# Patient Record
Sex: FEMALE | Race: OTHER | Employment: OTHER | URBAN - METROPOLITAN AREA
[De-identification: names, ages, dates, MRNs, and addresses within clinical notes are randomized per-mention and may not be internally consistent; named-entity substitution may affect disease eponyms.]

---

## 2020-01-10 ENCOUNTER — HOSPITAL ENCOUNTER (EMERGENCY)
Age: 75
Discharge: HOME | End: 2020-01-10
Attending: EMERGENCY MEDICINE
Payer: MEDICARE

## 2020-01-10 VITALS
HEIGHT: 62 IN | OXYGEN SATURATION: 96 % | BODY MASS INDEX: 46.01 KG/M2 | RESPIRATION RATE: 14 BRPM | HEART RATE: 87 BPM | WEIGHT: 250 LBS | DIASTOLIC BLOOD PRESSURE: 55 MMHG | SYSTOLIC BLOOD PRESSURE: 109 MMHG | TEMPERATURE: 98.2 F

## 2020-01-10 DIAGNOSIS — N18.5 CKD (CHRONIC KIDNEY DISEASE) STAGE 5, GFR LESS THAN 15 ML/MIN (HCC): Primary | ICD-10-CM

## 2020-01-10 DIAGNOSIS — M79.10 MYALGIA: ICD-10-CM

## 2020-01-10 LAB
ALBUMIN SERPL-MCNC: 3 G/DL (ref 3.4–5)
ALBUMIN/GLOB SERPL: 0.6 {RATIO} (ref 0.8–1.7)
ALP SERPL-CCNC: 154 U/L (ref 45–117)
ALT SERPL-CCNC: 13 U/L (ref 13–56)
ANION GAP SERPL CALC-SCNC: 4 MMOL/L (ref 3–18)
AST SERPL-CCNC: 16 U/L (ref 10–38)
BASOPHILS # BLD: 0.1 K/UL (ref 0–0.1)
BASOPHILS NFR BLD: 1 % (ref 0–2)
BILIRUB SERPL-MCNC: 0.4 MG/DL (ref 0.2–1)
BUN SERPL-MCNC: 46 MG/DL (ref 7–18)
BUN/CREAT SERPL: 12 (ref 12–20)
CALCIUM SERPL-MCNC: 8.9 MG/DL (ref 8.5–10.1)
CHLORIDE SERPL-SCNC: 115 MMOL/L (ref 100–111)
CO2 SERPL-SCNC: 25 MMOL/L (ref 21–32)
CREAT SERPL-MCNC: 3.84 MG/DL (ref 0.6–1.3)
DIFFERENTIAL METHOD BLD: ABNORMAL
EOSINOPHIL # BLD: 0.2 K/UL (ref 0–0.4)
EOSINOPHIL NFR BLD: 3 % (ref 0–5)
ERYTHROCYTE [DISTWIDTH] IN BLOOD BY AUTOMATED COUNT: 15.6 % (ref 11.6–14.5)
GLOBULIN SER CALC-MCNC: 4.7 G/DL (ref 2–4)
GLUCOSE SERPL-MCNC: 100 MG/DL (ref 74–99)
HCT VFR BLD AUTO: 28.8 % (ref 35–45)
HGB BLD-MCNC: 9.4 G/DL (ref 12–16)
LYMPHOCYTES # BLD: 3.8 K/UL (ref 0.9–3.6)
LYMPHOCYTES NFR BLD: 47 % (ref 21–52)
MCH RBC QN AUTO: 31.1 PG (ref 24–34)
MCHC RBC AUTO-ENTMCNC: 32.6 G/DL (ref 31–37)
MCV RBC AUTO: 95.4 FL (ref 74–97)
MONOCYTES # BLD: 0.7 K/UL (ref 0.05–1.2)
MONOCYTES NFR BLD: 9 % (ref 3–10)
NEUTS SEG # BLD: 3.2 K/UL (ref 1.8–8)
NEUTS SEG NFR BLD: 40 % (ref 40–73)
PLATELET # BLD AUTO: 287 K/UL (ref 135–420)
PMV BLD AUTO: 9.6 FL (ref 9.2–11.8)
POTASSIUM SERPL-SCNC: 5 MMOL/L (ref 3.5–5.5)
PROT SERPL-MCNC: 7.7 G/DL (ref 6.4–8.2)
RBC # BLD AUTO: 3.02 M/UL (ref 4.2–5.3)
SODIUM SERPL-SCNC: 144 MMOL/L (ref 136–145)
WBC # BLD AUTO: 8 K/UL (ref 4.6–13.2)

## 2020-01-10 PROCEDURE — 93005 ELECTROCARDIOGRAM TRACING: CPT

## 2020-01-10 PROCEDURE — 80053 COMPREHEN METABOLIC PANEL: CPT

## 2020-01-10 PROCEDURE — 85025 COMPLETE CBC W/AUTO DIFF WBC: CPT

## 2020-01-10 PROCEDURE — 99282 EMERGENCY DEPT VISIT SF MDM: CPT

## 2020-01-10 RX ORDER — OXYCODONE AND ACETAMINOPHEN 5; 325 MG/1; MG/1
1 TABLET ORAL
Qty: 9 TAB | Refills: 0 | Status: SHIPPED | OUTPATIENT
Start: 2020-01-10 | End: 2020-01-13

## 2020-01-10 NOTE — DISCHARGE INSTRUCTIONS
Patient Education        Kidney Dialysis: Care Instructions  Your Care Instructions    Dialysis is a process that filters wastes from the blood when your kidneys can no longer do the job. It is not a cure, but it can help you live longer and feel better. It is a lifesaving treatment when you have kidney failure. Normal kidneys work 24 hours a day to clean wastes from your blood. Your kidneys are not able to do this job, so a process called dialysis will do some of the work for your kidneys. You and your doctor will decide which type of dialysis you should have. Peritoneal dialysis uses the lining of your belly (peritoneum) to filter your blood. You can do it at home, on a daily basis. Hemodialysis uses a man-made filter called a dialyzer to clean your blood. Most people need to go to a hospital or clinic 3 days a week for several hours each time. Sometimes hemodialysis can be done at home. It is normal to have questions about your treatment, and you have a right to know what is happening to you. Learning about dialysis can help you take an active role in your treatment. Dialysis does not cure kidney disease, but it can help you live longer and feel better. You will need to follow your diet and treatment schedule carefully. Follow-up care is a key part of your treatment and safety. Be sure to make and go to all appointments, and call your doctor if you are having problems. It's also a good idea to know your test results and keep a list of the medicines you take. What do you need to know about peritoneal dialysis? Peritoneal dialysis uses the lining of your belly (or peritoneal membrane) to filter your blood. Before you can begin peritoneal dialysis, your doctor will need to place a thin tube called a catheter in your belly. This is the dialysis access. · Peritoneal dialysis can be done at home or in any clean place. You may be able to do it while you sleep. · You can do it by yourself.  You do not have to rely on help from others. · You can do it at the times you choose as long as you do the right number of treatments. · It has to be done every day of the week. · Some people find it hard to do all the required steps. · It increases your chance for a serious infection of the lining of the belly (peritoneum). What do you need to know about hemodialysis? Hemodialysis uses a man-made membrane called a dialyzer to clean your blood. You are connected to the dialyzer by tubes attached to your blood vessels. Before you start hemodialysis, your doctor will create a site where the blood can flow in and out of your body during your dialysis sessions. This site is called the vascular access. It may be a fistula, made by connecting an artery and a vein. Or it may be a graft, which is a tube implanted under your skin. · Hemodialysis is done mainly by trained health workers who can watch for any problems. · It allows you to be in contact with other people having dialysis. This can help provide emotional support. · You can schedule your treatments in the evenings so you can keep working. · You may be able to do home hemodialysis, which gives you more control over your schedule. · It usually needs to be done on a set schedule 3 times a week. · It can cause side effects. The most common side effects are low blood pressure and muscle cramps. These can often be treated easily. · It requires needle sticks during every treatment, which bothers some people. Others get used to it and even do the needle sticks themselves. How can you care for yourself at home? · Be sure to have all of your dialysis sessions. Do not try to shorten or skip your sessions. You have a better chance of a longer and healthier life by getting your full treatment. · Your doctor or health care team will show you the steps you need to go through each day before, during, and after dialysis. Be sure to follow these steps.  If you do not understand a step, talk to your team.  · Your doctor and dietitian will help you design menus that follow your diet. Be sure to follow your diet guidelines. ? You will need to limit fluids and certain foods that contain salt (sodium), potassium, and phosphorus. ? You may need to follow a heart-healthy diet to keep the fat (cholesterol) in your blood under control. ? You may need higher levels of protein in your diet. · Your doctor may recommend certain vitamins. But do not take any other medicine, including over-the-counter medicines, vitamins, and herbal products, without talking to your doctor first.  · Do not smoke. Smoking raises your risk of many health problems, including more kidney damage. If you need help quitting, talk to your doctor about stop-smoking programs and medicines. These can increase your chances of quitting for good. · Do not take ibuprofen (Advil, Motrin), naproxen (Aleve), or similar medicines, unless your doctor tells you to. These medicines may make kidney problems worse. When should you call for help? Call your doctor now or seek immediate medical care if:    · You have a fever.     · You are dizzy or lightheaded, or you feel like you may faint.     · You are confused or cannot think clearly.     · You have new or worse nausea or vomiting.     · You have new or more blood in your urine.     · You have new swelling.    Watch closely for changes in your health, and be sure to contact your doctor if:    · You do not get better as expected. Where can you learn more? Go to http://jasmin-todd.info/. Enter N127 in the search box to learn more about \"Kidney Dialysis: Care Instructions. \"  Current as of: October 31, 2018  Content Version: 12.2  © 1928-7021 [employer]. Care instructions adapted under license by [employer] (which disclaims liability or warranty for this information).  If you have questions about a medical condition or this instruction, always ask your healthcare professional. [address] any warranty or liability for your use of this information.

## 2020-01-10 NOTE — ED PROVIDER NOTES
EMERGENCY DEPARTMENT HISTORY AND PHYSICAL EXAM    Date: 1/10/2020  Patient Name: Edwardo Bloom    History of Presenting Illness     Chief Complaint   Patient presents with    Numbness    Abnormal Lab Results         History Provided By: Patient      Additional History (Context): Edwardo Bloom is a 66 y.o. female with hypertension and CKD stage 5 who presents with myalgia. Is visiting from [place], [place] her son now x 3 weeks. Has AV[facility] in [facility] for pending dialysis start. Started feeling more myalgias and went to [facility] ED and sat for 6+hrs w/o being seen. Went to urgent care and labs drawn. Diagnosed w/hyperkalemia and instructed to come for nephro eval and possible dialysis initiation. Denies CP, SOB. PCP: Samira, MD [place]    Current Outpatient Medications   Medication Sig Dispense Refill    oxyCODONE-acetaminophen (PERCOCET) 5-325 mg per tablet Take 1 Tab by mouth every eight (8) hours as needed for Pain for up to 3 days. Max Daily Amount: 3 Tabs. Take 1 tablet every 6 hours as needed for pain control. 9 Tab 0       Past History     Past Medical History:  History reviewed. No pertinent past medical history. Past Surgical History:  History reviewed. No pertinent surgical history. Family History:  History reviewed. No pertinent family history. Social History:  Social History     Tobacco Use    Smoking status: Never Smoker    Smokeless tobacco: Never Used   Substance Use Topics    Alcohol use: Never     Frequency: Never    Drug use: Never       Allergies: Allergies   Allergen Reactions    Ciprofloxacin Hives    Statins-Hmg-Coa Reductase Inhibitors Other (comments)     Body ache         Review of Systems   Review of Systems   Constitutional: Negative for fever. Respiratory: Negative for shortness of breath. Cardiovascular: Negative for chest pain. Genitourinary: Negative for difficulty urinating and dysuria. Has chronic difficulty initiating urine; is frequently incontinent, unchanged. Musculoskeletal: Positive for myalgias. Neurological: Negative for tremors, weakness and numbness. All Other Systems Negative  Physical Exam     Vitals:    01/10/20 1623   BP: 109/55   Pulse: 87   Resp: 14   Temp: 98.2 °F (36.8 °C)   SpO2: 96%   Weight: 113.4 kg (250 lb)   Height: 5' 2\" (1.575 m)     Physical Exam  Vitals signs and nursing note reviewed. Constitutional:       Appearance: She is well-developed. HENT:      Head: Normocephalic and atraumatic. Eyes:      Pupils: Pupils are equal, round, and reactive to light. Neck:      Thyroid: No thyromegaly. Vascular: No JVD. Trachea: No tracheal deviation. Cardiovascular:      Rate and Rhythm: Normal rate and regular rhythm. Heart sounds: Normal heart sounds. No murmur. No friction rub. No gallop. Pulmonary:      Effort: Pulmonary effort is normal. No respiratory distress. Breath sounds: Normal breath sounds. No stridor. No wheezing or rales. Chest:      Chest wall: No tenderness. Abdominal:      General: There is no distension. Palpations: Abdomen is soft. There is no mass. Tenderness: There is no tenderness. There is no guarding or rebound. Musculoskeletal:         General: No tenderness. Lymphadenopathy:      Cervical: No cervical adenopathy. Skin:     General: Skin is warm and dry. Coloration: Skin is not pale. Findings: No erythema or rash. Neurological:      Mental Status: She is alert and oriented to person, place, and time. Psychiatric:         Behavior: Behavior normal.         Thought Content:  Thought content normal.            Diagnostic Study Results     Labs -     Recent Results (from the past 12 hour(s))   METABOLIC PANEL, COMPREHENSIVE    Collection Time: 01/10/20  4:24 PM   Result Value Ref Range    Sodium 144 136 - 145 mmol/L    Potassium 5.0 3.5 - 5.5 mmol/L    Chloride 115 (H) 100 - 111 mmol/L    CO2 25 21 - 32 mmol/L    Anion gap 4 3.0 - 18 mmol/L    Glucose 100 (H) 74 - 99 mg/dL    BUN 46 (H) 7.0 - 18 MG/DL    Creatinine 3.84 (H) 0.6 - 1.3 MG/DL    BUN/Creatinine ratio 12 12 - 20      GFR est AA 14 (L) >60 ml/min/1.73m2    GFR est non-AA 11 (L) >60 ml/min/1.73m2    Calcium 8.9 8.5 - 10.1 MG/DL    Bilirubin, total 0.4 0.2 - 1.0 MG/DL    ALT (SGPT) 13 13 - 56 U/L    AST (SGOT) 16 10 - 38 U/L    Alk. phosphatase 154 (H) 45 - 117 U/L    Protein, total 7.7 6.4 - 8.2 g/dL    Albumin 3.0 (L) 3.4 - 5.0 g/dL    Globulin 4.7 (H) 2.0 - 4.0 g/dL    A-G Ratio 0.6 (L) 0.8 - 1.7     CBC WITH AUTOMATED DIFF    Collection Time: 01/10/20  4:24 PM   Result Value Ref Range    WBC 8.0 4.6 - 13.2 K/uL    RBC 3.02 (L) 4.20 - 5.30 M/uL    HGB 9.4 (L) 12.0 - 16.0 g/dL    HCT 28.8 (L) 35.0 - 45.0 %    MCV 95.4 74.0 - 97.0 FL    MCH 31.1 24.0 - 34.0 PG    MCHC 32.6 31.0 - 37.0 g/dL    RDW 15.6 (H) 11.6 - 14.5 %    PLATELET 095 586 - 692 K/uL    MPV 9.6 9.2 - 11.8 FL    NEUTROPHILS 40 40 - 73 %    LYMPHOCYTES 47 21 - 52 %    MONOCYTES 9 3 - 10 %    EOSINOPHILS 3 0 - 5 %    BASOPHILS 1 0 - 2 %    ABS. NEUTROPHILS 3.2 1.8 - 8.0 K/UL    ABS. LYMPHOCYTES 3.8 (H) 0.9 - 3.6 K/UL    ABS. MONOCYTES 0.7 0.05 - 1.2 K/UL    ABS. EOSINOPHILS 0.2 0.0 - 0.4 K/UL    ABS. BASOPHILS 0.1 0.0 - 0.1 K/UL    DF AUTOMATED     EKG, 12 LEAD, INITIAL    Collection Time: 01/10/20  4:30 PM   Result Value Ref Range    Ventricular Rate 86 BPM    Atrial Rate 86 BPM    QRS Duration 80 ms    Q-T Interval 348 ms    QTC Calculation (Bezet) 416 ms    Calculated R Axis -24 degrees    Calculated T Axis 25 degrees    Diagnosis       Accelerated Junctional rhythm  Minimal voltage criteria for LVH, may be normal variant  Anterior infarct , age undetermined  Abnormal ECG  No previous ECGs available         Radiologic Studies -   No orders to display     CT Results  (Last 48 hours)    None        CXR Results  (Last 48 hours)    None            Medical Decision Making   I am the first provider for this patient.     I reviewed the vital signs, available nursing notes, past medical history, past surgical history, family history and social history. Vital Signs-Reviewed the patient's vital signs. Records Reviewed: Nursing Notes, Old Medical Records and Previous Laboratory Studies    Procedures:  Procedures    Provider Notes (Medical Decision Making): labs today w/creatinine of 3.84, similar to 10/21/19. Her potassium is 5.0. Advised pt to return to ED or urgent care for testing of chemistries, CBC early next week prior to planned return to [place] later next week. MED RECONCILIATION:  No current facility-administered medications for this encounter. Current Outpatient Medications   Medication Sig    oxyCODONE-acetaminophen (PERCOCET) 5-325 mg per tablet Take 1 Tab by mouth every eight (8) hours as needed for Pain for up to 3 days. Max Daily Amount: 3 Tabs. Take 1 tablet every 6 hours as needed for pain control. Disposition:  home    DISCHARGE NOTE:   5:31 PM    Pt has been reexamined. Patient has no new complaints, changes, or physical findings. Care plan outlined and precautions discussed. Results of labs were reviewed with the patient. All medications were reviewed with the patient; will d/c home with percocet. All of pt's questions and concerns were addressed. Patient was instructed and agrees to follow up with PCP, nephrology, urgent care, as well as to return to the ED upon further deterioration. Patient is ready to go home.     Follow-up Information     Follow up With Specialties Details Why Contact Info    your urgent care or our department  On 1/14/2020 have your lab values re-checked     [facility] EMERGENCY DEPT Emergency Medicine  If symptoms worsen return immediately [address] [place] [id number]  [phone]          Current Discharge Medication List      START taking these medications    Details   oxyCODONE-acetaminophen (PERCOCET) 5-325 mg per tablet Take 1 Tab by mouth every eight (8) hours as needed for Pain for up to 3 days. Max Daily Amount: 3 Tabs. Take 1 tablet every 6 hours as needed for pain control. Qty: 9 Tab, Refills: 0    Associated Diagnoses: Myalgia               Diagnosis     Clinical Impression:   1. CKD (chronic kidney disease) stage 5, GFR less than 15 ml/min ([facility])    2.  Myalgia

## 2020-01-10 NOTE — ED TRIAGE NOTES
\"My arms have been feeling numb and painful for the past four days. I went to [facility] general two days ago. We sat there for eight hours and they didn't even see us. I went to [facility] yesterday and they said my Potassium was high. \"

## 2020-01-12 LAB
ATRIAL RATE: 86 BPM
CALCULATED R AXIS, ECG10: -24 DEGREES
CALCULATED T AXIS, ECG11: 25 DEGREES
DIAGNOSIS, 93000: NORMAL
Q-T INTERVAL, ECG07: 348 MS
QRS DURATION, ECG06: 80 MS
QTC CALCULATION (BEZET), ECG08: 416 MS
VENTRICULAR RATE, ECG03: 86 BPM

## 2020-01-16 ENCOUNTER — HOSPITAL ENCOUNTER (EMERGENCY)
Age: 75
Discharge: HOME | End: 2020-01-16
Attending: EMERGENCY MEDICINE
Payer: MEDICARE

## 2020-01-16 ENCOUNTER — APPOINTMENT (OUTPATIENT)
Dept: GENERAL RADIOLOGY | Age: 75
End: 2020-01-16
Attending: PHYSICIAN ASSISTANT
Payer: MEDICARE

## 2020-01-16 VITALS
DIASTOLIC BLOOD PRESSURE: 67 MMHG | HEART RATE: 96 BPM | TEMPERATURE: 98.6 F | SYSTOLIC BLOOD PRESSURE: 141 MMHG | OXYGEN SATURATION: 95 % | RESPIRATION RATE: 18 BRPM

## 2020-01-16 DIAGNOSIS — N39.0 URINARY TRACT INFECTION WITHOUT HEMATURIA, SITE UNSPECIFIED: Primary | ICD-10-CM

## 2020-01-16 LAB
ALBUMIN SERPL-MCNC: 3 G/DL (ref 3.4–5)
ALBUMIN/GLOB SERPL: 0.6 {RATIO} (ref 0.8–1.7)
ALP SERPL-CCNC: 240 U/L (ref 45–117)
ALT SERPL-CCNC: 16 U/L (ref 13–56)
ANION GAP SERPL CALC-SCNC: 10 MMOL/L (ref 3–18)
APPEARANCE UR: ABNORMAL
AST SERPL-CCNC: 27 U/L (ref 10–38)
BACTERIA URNS QL MICRO: ABNORMAL /HPF
BASOPHILS # BLD: 0 K/UL (ref 0–0.1)
BASOPHILS NFR BLD: 0 % (ref 0–2)
BILIRUB SERPL-MCNC: 0.5 MG/DL (ref 0.2–1)
BILIRUB UR QL: NEGATIVE
BUN SERPL-MCNC: 52 MG/DL (ref 7–18)
BUN/CREAT SERPL: 12 (ref 12–20)
CALCIUM SERPL-MCNC: 8.7 MG/DL (ref 8.5–10.1)
CHLORIDE SERPL-SCNC: 108 MMOL/L (ref 100–111)
CO2 SERPL-SCNC: 21 MMOL/L (ref 21–32)
COLOR UR: YELLOW
CREAT SERPL-MCNC: 4.48 MG/DL (ref 0.6–1.3)
DIFFERENTIAL METHOD BLD: ABNORMAL
EOSINOPHIL # BLD: 0.1 K/UL (ref 0–0.4)
EOSINOPHIL NFR BLD: 1 % (ref 0–5)
EPITH CASTS URNS QL MICRO: ABNORMAL /LPF (ref 0–5)
ERYTHROCYTE [DISTWIDTH] IN BLOOD BY AUTOMATED COUNT: 14.9 % (ref 11.6–14.5)
GLOBULIN SER CALC-MCNC: 4.7 G/DL (ref 2–4)
GLUCOSE SERPL-MCNC: 92 MG/DL (ref 74–99)
GLUCOSE UR STRIP.AUTO-MCNC: NEGATIVE MG/DL
HCT VFR BLD AUTO: 30 % (ref 35–45)
HGB BLD-MCNC: 9.7 G/DL (ref 12–16)
HGB UR QL STRIP: ABNORMAL
KETONES UR QL STRIP.AUTO: NEGATIVE MG/DL
LEUKOCYTE ESTERASE UR QL STRIP.AUTO: ABNORMAL
LYMPHOCYTES # BLD: 3.2 K/UL (ref 0.9–3.6)
LYMPHOCYTES NFR BLD: 33 % (ref 21–52)
MCH RBC QN AUTO: 30.5 PG (ref 24–34)
MCHC RBC AUTO-ENTMCNC: 32.3 G/DL (ref 31–37)
MCV RBC AUTO: 94.3 FL (ref 74–97)
MONOCYTES # BLD: 1 K/UL (ref 0.05–1.2)
MONOCYTES NFR BLD: 10 % (ref 3–10)
NEUTS SEG # BLD: 5.5 K/UL (ref 1.8–8)
NEUTS SEG NFR BLD: 56 % (ref 40–73)
NITRITE UR QL STRIP.AUTO: POSITIVE
PH UR STRIP: 6 [PH] (ref 5–8)
PLATELET # BLD AUTO: 259 K/UL (ref 135–420)
PMV BLD AUTO: 10.2 FL (ref 9.2–11.8)
POTASSIUM SERPL-SCNC: 4.7 MMOL/L (ref 3.5–5.5)
PROT SERPL-MCNC: 7.7 G/DL (ref 6.4–8.2)
PROT UR STRIP-MCNC: 100 MG/DL
RBC # BLD AUTO: 3.18 M/UL (ref 4.2–5.3)
RBC #/AREA URNS HPF: ABNORMAL /HPF (ref 0–5)
SODIUM SERPL-SCNC: 139 MMOL/L (ref 136–145)
SP GR UR REFRACTOMETRY: 1.01 (ref 1–1.03)
UROBILINOGEN UR QL STRIP.AUTO: 1 EU/DL (ref 0.2–1)
WBC # BLD AUTO: 9.9 K/UL (ref 4.6–13.2)
WBC URNS QL MICRO: ABNORMAL /HPF (ref 0–4)

## 2020-01-16 PROCEDURE — 93005 ELECTROCARDIOGRAM TRACING: CPT

## 2020-01-16 PROCEDURE — 80053 COMPREHEN METABOLIC PANEL: CPT

## 2020-01-16 PROCEDURE — 85025 COMPLETE CBC W/AUTO DIFF WBC: CPT

## 2020-01-16 PROCEDURE — 99283 EMERGENCY DEPT VISIT LOW MDM: CPT

## 2020-01-16 PROCEDURE — 71046 X-RAY EXAM CHEST 2 VIEWS: CPT

## 2020-01-16 PROCEDURE — 81001 URINALYSIS AUTO W/SCOPE: CPT

## 2020-01-16 RX ORDER — ACETAMINOPHEN 325 MG/1
650 TABLET ORAL 2 TIMES DAILY
COMMUNITY
End: 2021-11-19

## 2020-01-16 RX ORDER — SULFAMETHOXAZOLE AND TRIMETHOPRIM 800; 160 MG/1; MG/1
1 TABLET ORAL 2 TIMES DAILY
Qty: 20 TAB | Refills: 0 | Status: SHIPPED | OUTPATIENT
Start: 2020-01-16 | End: 2020-01-26

## 2020-01-16 RX ORDER — CYCLOBENZAPRINE HCL 5 MG
5 TABLET ORAL DAILY
COMMUNITY
End: 2020-06-18 | Stop reason: CLARIF

## 2020-01-16 RX ORDER — ASCORBIC ACID 500 MG
500 TABLET ORAL DAILY
COMMUNITY

## 2020-01-16 RX ORDER — ACETAMINOPHEN 500 MG
2000 TABLET ORAL 2 TIMES DAILY
COMMUNITY

## 2020-01-16 RX ORDER — LATANOPROST 50 UG/ML
1 SOLUTION/ DROPS OPHTHALMIC
COMMUNITY

## 2020-01-16 RX ORDER — MECLIZINE HYDROCHLORIDE 25 MG/1
TABLET ORAL
COMMUNITY
End: 2020-02-20 | Stop reason: ALTCHOICE

## 2020-01-16 RX ORDER — LEVOTHYROXINE SODIUM 125 UG/1
125 TABLET ORAL
COMMUNITY

## 2020-01-16 RX ORDER — TRAMADOL HYDROCHLORIDE 50 MG/1
50 TABLET ORAL
COMMUNITY
End: 2020-02-20 | Stop reason: SINTOL

## 2020-01-16 RX ORDER — AMLODIPINE BESYLATE 5 MG/1
5 TABLET ORAL DAILY
COMMUNITY
End: 2020-05-18

## 2020-01-16 RX ORDER — CALCITRIOL 0.25 UG/1
0.25 CAPSULE ORAL DAILY
COMMUNITY
End: 2022-06-28

## 2020-01-16 RX ORDER — CARVEDILOL 12.5 MG/1
TABLET ORAL 2 TIMES DAILY WITH MEALS
COMMUNITY
End: 2020-05-18

## 2020-01-16 RX ORDER — GABAPENTIN 100 MG/1
100 CAPSULE ORAL
COMMUNITY
End: 2020-02-24

## 2020-01-16 RX ORDER — ALLOPURINOL 100 MG/1
100 TABLET ORAL DAILY
Status: ON HOLD | COMMUNITY
End: 2021-12-02 | Stop reason: SDUPTHER

## 2020-01-16 RX ORDER — OMEPRAZOLE 20 MG/1
20 CAPSULE, DELAYED RELEASE ORAL DAILY
COMMUNITY

## 2020-01-16 RX ORDER — ONDANSETRON 4 MG/1
4 TABLET, ORALLY DISINTEGRATING ORAL
COMMUNITY

## 2020-01-16 NOTE — ED PROVIDER NOTES
EMERGENCY DEPARTMENT HISTORY AND PHYSICAL EXAM    4:25 PM      Date: 1/16/2020  Patient Name: Safia Reynolds    History of Presenting Illness     Chief Complaint: Weakness    History Provided By: Patient  Location/Duration/Severity/Modifying factors   HPI     Ms Stern is a 66year-old  female with a past medical history significant for stage 5 chronic kidney disease who presents to the ED with a chief complaint of gradually worsening weakness, onset one month ago. Patient states she is in town visiting family and has been feeling weak for an extended period of time with no primary care physician in town. Patient states she presented to the hospital recently for similar symptoms and was discharged to home. She describes she is also noticing foul smelling urine. Patient states she has a nephrostomy tube in place but is incontinent at baseline. She states she has noticed some burning with incontinence. Patient denies any chest pain, shortness of breath, nausea, vomiting, leg swelling, headaches, or palpitations. PCP: Lexus Hogan MD    Current Outpatient Medications   Medication Sig Dispense Refill    acetaminophen (TYLENOL) 325 mg tablet Take 650 mg by mouth two (2) times a day.  allopurinoL (ZYLOPRIM) 100 mg tablet Take 200 mg by mouth daily.  amLODIPine (NORVASC) 5 mg tablet Take 5 mg by mouth daily.  ascorbic acid, vitamin C, (VITAMIN C) 500 mg tablet Take 500 mg by mouth.  calcitRIOL (ROCALTROL) 0.25 mcg capsule Take 0.25 mcg by mouth every other day.  carvediloL (COREG) 12.5 mg tablet Take  by mouth two (2) times daily (with meals).  cholecalciferol (VITAMIN D3) (2,000 UNITS /50 MCG) cap capsule Take 2,000 Units by mouth two (2) times a day. Take two tabs a total of 4000 units      cyclobenzaprine (FLEXERIL) 5 mg tablet Take 5 mg by mouth daily.  gabapentin (NEURONTIN) 100 mg capsule Take  by mouth three (3) times daily.       latanoprost (XALATAN) 0.005 % ophthalmic solution Administer 1 Drop to both eyes nightly. One drop at bedtime      levothyroxine (SYNTHROID) 125 mcg tablet Take 125 mcg by mouth Daily (before breakfast).  meclizine (ANTIVERT) 25 mg tablet Take  by mouth three (3) times daily as needed for Dizziness.  omeprazole (PRILOSEC) 20 mg capsule Take 20 mg by mouth daily.  ondansetron (ZOFRAN ODT) 4 mg disintegrating tablet Take 4 mg by mouth every eight (8) hours as needed for Nausea or Vomiting.  B.infantis-B.ani-B.long-B.bifi (PROBIOTIC 4X) 10-15 mg TbEC Take  by mouth.  traMADol (ULTRAM) 50 mg tablet Take 50 mg by mouth two (2) times daily as needed for Pain.  vit B Cmplx 3-FA-Vit C-Biotin (NEPHRO HOWIE RX) 1- mg-mg-mcg tablet Take 1 Tab by mouth daily.  trimethoprim-sulfamethoxazole (BACTRIM DS) 160-800 mg per tablet Take 1 Tab by mouth two (2) times a day for 10 days. 20 Tab 0       Past History     Past Medical History:  No past medical history on file. Past Surgical History:  No past surgical history on file. Family History:  No family history on file. Social History:  Social History     Tobacco Use    Smoking status: Never Smoker    Smokeless tobacco: Never Used   Substance Use Topics    Alcohol use: Never     Frequency: Never    Drug use: Never       Allergies: Allergies   Allergen Reactions    Ciprofloxacin Hives    Statins-Hmg-Coa Reductase Inhibitors Other (comments)     Body ache         Review of Systems     Review of Systems   Constitutional: Positive for fatigue. Negative for chills, diaphoresis, fever and unexpected weight change. HENT: Negative for congestion, rhinorrhea and sore throat. Eyes: Negative for pain and redness. Respiratory: Negative for cough, chest tightness, shortness of breath and wheezing. Cardiovascular: Negative for chest pain, palpitations and leg swelling. Gastrointestinal: Negative for abdominal pain, constipation, diarrhea, nausea and vomiting. Genitourinary: Negative for dysuria, flank pain, frequency, hematuria, urgency and vaginal discharge. Foul swelling urine   Musculoskeletal: Negative for back pain and myalgias. Skin: Negative for pallor and rash. Neurological: Negative for dizziness, light-headedness, numbness and headaches. Psychiatric/Behavioral: Negative for confusion. Physical Exam     Visit Vitals  /67 (BP 1 Location: Left arm, BP Patient Position: Sitting)   Pulse 96   Temp 98.6 °F (37 °C)   Resp 18   SpO2 95%       Physical Exam  Vitals signs and nursing note reviewed. Constitutional:       Appearance: She is well-developed. HENT:      Head: Normocephalic and atraumatic. Neck:      Musculoskeletal: Normal range of motion and neck supple. Cardiovascular:      Rate and Rhythm: Normal rate and regular rhythm. Pulmonary:      Effort: Pulmonary effort is normal.      Breath sounds: Normal breath sounds. Chest:      Chest wall: No mass, tenderness or edema. Abdominal:      General: Bowel sounds are normal.      Palpations: Abdomen is soft. Comments: Right side nephrostomy tube noted draining purulent urine. Musculoskeletal: Normal range of motion. Right lower leg: No edema. Left lower leg: No edema. Skin:     General: Skin is warm and dry. Capillary Refill: Capillary refill takes less than 2 seconds. Neurological:      General: No focal deficit present. Mental Status: She is alert.    Psychiatric:         Mood and Affect: Mood normal.         Behavior: Behavior normal.           Diagnostic Study Results     Labs -  Recent Results (from the past 12 hour(s))   EKG, 12 LEAD, INITIAL    Collection Time: 01/16/20  4:20 PM   Result Value Ref Range    Ventricular Rate 93 BPM    Atrial Rate 97 BPM    QRS Duration 86 ms    Q-T Interval 342 ms    QTC Calculation (Bezet) 425 ms    Calculated R Axis -31 degrees    Calculated T Axis 38 degrees    Diagnosis       Accelerated Junctional rhythm  Left axis deviation  Moderate voltage criteria for LVH, may be normal variant  Abnormal ECG  When compared with ECG of 10-AQUILES-2020 16:30,  No significant change was found     CBC WITH AUTOMATED DIFF    Collection Time: 01/16/20  4:34 PM   Result Value Ref Range    WBC 9.9 4.6 - 13.2 K/uL    RBC 3.18 (L) 4.20 - 5.30 M/uL    HGB 9.7 (L) 12.0 - 16.0 g/dL    HCT 30.0 (L) 35.0 - 45.0 %    MCV 94.3 74.0 - 97.0 FL    MCH 30.5 24.0 - 34.0 PG    MCHC 32.3 31.0 - 37.0 g/dL    RDW 14.9 (H) 11.6 - 14.5 %    PLATELET 861 071 - 147 K/uL    MPV 10.2 9.2 - 11.8 FL    NEUTROPHILS 56 40 - 73 %    LYMPHOCYTES 33 21 - 52 %    MONOCYTES 10 3 - 10 %    EOSINOPHILS 1 0 - 5 %    BASOPHILS 0 0 - 2 %    ABS. NEUTROPHILS 5.5 1.8 - 8.0 K/UL    ABS. LYMPHOCYTES 3.2 0.9 - 3.6 K/UL    ABS. MONOCYTES 1.0 0.05 - 1.2 K/UL    ABS. EOSINOPHILS 0.1 0.0 - 0.4 K/UL    ABS. BASOPHILS 0.0 0.0 - 0.1 K/UL    DF AUTOMATED     METABOLIC PANEL, COMPREHENSIVE    Collection Time: 01/16/20  4:34 PM   Result Value Ref Range    Sodium 139 136 - 145 mmol/L    Potassium 4.7 3.5 - 5.5 mmol/L    Chloride 108 100 - 111 mmol/L    CO2 21 21 - 32 mmol/L    Anion gap 10 3.0 - 18 mmol/L    Glucose 92 74 - 99 mg/dL    BUN 52 (H) 7.0 - 18 MG/DL    Creatinine 4.48 (H) 0.6 - 1.3 MG/DL    BUN/Creatinine ratio 12 12 - 20      GFR est AA 12 (L) >60 ml/min/1.73m2    GFR est non-AA 10 (L) >60 ml/min/1.73m2    Calcium 8.7 8.5 - 10.1 MG/DL    Bilirubin, total 0.5 0.2 - 1.0 MG/DL    ALT (SGPT) 16 13 - 56 U/L    AST (SGOT) 27 10 - 38 U/L    Alk.  phosphatase 240 (H) 45 - 117 U/L    Protein, total 7.7 6.4 - 8.2 g/dL    Albumin 3.0 (L) 3.4 - 5.0 g/dL    Globulin 4.7 (H) 2.0 - 4.0 g/dL    A-G Ratio 0.6 (L) 0.8 - 1.7     URINALYSIS W/ RFLX MICROSCOPIC    Collection Time: 01/16/20  4:38 PM   Result Value Ref Range    Color YELLOW      Appearance CLOUDY      Specific gravity 1.015 1.005 - 1.030      pH (UA) 6.0 5.0 - 8.0      Protein 100 (A) NEG mg/dL    Glucose NEGATIVE  NEG mg/dL Ketone NEGATIVE  NEG mg/dL    Bilirubin NEGATIVE  NEG      Blood SMALL (A) NEG      Urobilinogen 1.0 0.2 - 1.0 EU/dL    Nitrites POSITIVE (A) NEG      Leukocyte Esterase LARGE (A) NEG     URINE MICROSCOPIC ONLY    Collection Time: 01/16/20  4:38 PM   Result Value Ref Range    WBC 25 to 30 0 - 4 /hpf    RBC 1 to 3 0 - 5 /hpf    Epithelial cells FEW 0 - 5 /lpf    Bacteria 4+ (A) NEG /hpf       Radiologic Studies -   XR CHEST PA LAT   Final Result   IMPRESSION:   1. No acute cardiopulmonary process. Medical Decision Making   I am the first provider for this patient. I reviewed the vital signs, available nursing notes, past medical history, past surgical history, family history and social history. Vital Signs-Reviewed the patient's vital signs. EKG: NSR. L axis deviation. 86 bpm. No signs of acute ischemia. Records Reviewed: Nursing Notes, Old Medical Records, Previous Radiology Studies and Previous Laboratory Studies (Time of Review: 4:25 PM)    ED Course: Progress Notes, Reevaluation, and Consults:        4:33 PM Patient seen and evaluated. No acute distress. CBC, CMP, ECG, CXR, urinalysis ordered. 5:51 PM Labwork and imaging reviewed. Patient to be discharged to home with diagnosis of UTI. Provider Notes (Medical Decision Making):   [facility]    Ms Abelino Rivera is a 66year-old  female with a past medical history significant for stage 5 chronic kidney disease who presents to the ED with a chief complaint of gradually worsening weakness, onset one month ago. She describes she is also noticing foul smelling urine. Patient has right sided nephrostomy tube that is draining purulent foul smelling urine. Chest X-ray showed no acute process. ECG no signs of ischemia. Urine had large leukocyte esterase with 4+ bacteria and positive nitrites. CMP shows worsening of kidney function, patient states she is already stage 5 CKD. CBC unremarkable, hgb stable.  Patient to be discharged to home with 10 day prescription for Bactrim for UTI. Given information for [facility] to establish care. Strict return precautions discussed. Procedures      Diagnosis     Clinical Impression:   1. Urinary tract infection without hematuria, site unspecified        Disposition: Discharged to Home    Follow-up Information     Follow up With Specialties Details Why [address]  Schedule an appointment as soon as possible for a visit in 1 week  [address]  [facility] [address] [place] [address] [address]    [address] EMERGENCY DEPT Emergency Medicine  As needed, If symptoms worsen [address] [place] [id number]  [phone]           Patient's Medications   Start Taking    TRIMETHOPRIM-SULFAMETHOXAZOLE (BACTRIM DS) 160-800 MG PER TABLET    Take 1 Tab by mouth two (2) times a day for 10 days. Continue Taking    ACETAMINOPHEN (TYLENOL) 325 MG TABLET    Take 650 mg by mouth two (2) times a day. ALLOPURINOL (ZYLOPRIM) 100 MG TABLET    Take 200 mg by mouth daily. AMLODIPINE (NORVASC) 5 MG TABLET    Take 5 mg by mouth daily. ASCORBIC ACID, VITAMIN C, (VITAMIN C) 500 MG TABLET    Take 500 mg by mouth. B.INFANTIS-B. ANI-B. LONG-B.BIFI (PROBIOTIC 4X) 10-15 MG TBEC    Take  by mouth. CALCITRIOL (ROCALTROL) 0.25 MCG CAPSULE    Take 0.25 mcg by mouth every other day. CARVEDILOL (COREG) 12.5 MG TABLET    Take  by mouth two (2) times daily (with meals). CHOLECALCIFEROL (VITAMIN D3) (2,000 UNITS /50 MCG) CAP CAPSULE    Take 2,000 Units by mouth two (2) times a day. Take two tabs a total of 4000 units    CYCLOBENZAPRINE (FLEXERIL) 5 MG TABLET    Take 5 mg by mouth daily. GABAPENTIN (NEURONTIN) 100 MG CAPSULE    Take  by mouth three (3) times daily. LATANOPROST (XALATAN) 0.005 % OPHTHALMIC SOLUTION    Administer 1 Drop to both eyes nightly. One drop at bedtime    LEVOTHYROXINE (SYNTHROID) 125 MCG TABLET    Take 125 mcg by mouth Daily (before breakfast). MECLIZINE (ANTIVERT) 25 MG TABLET    Take  by mouth three (3) times daily as needed for Dizziness. OMEPRAZOLE (PRILOSEC) 20 MG CAPSULE    Take 20 mg by mouth daily. ONDANSETRON (ZOFRAN ODT) 4 MG DISINTEGRATING TABLET    Take 4 mg by mouth every eight (8) hours as needed for Nausea or Vomiting. TRAMADOL (ULTRAM) 50 MG TABLET    Take 50 mg by mouth two (2) times daily as needed for Pain. VIT B CMPLX 3-FA-VIT C-BIOTIN (NEPHRO HOWIE RX) 1- MG-MG-MCG TABLET    Take 1 Tab by mouth daily. These Medications have changed    No medications on file   Stop Taking    No medications on file     Disclaimer: Sections of this note are dictated using utilizing voice recognition software. Minor typographical errors may be present. If questions arise, please do not hesitate to contact me or call our department.

## 2020-01-16 NOTE — ED TRIAGE NOTES
Pt reports that she has stage 5 renal disease and reports that she has been feeling progressively weak.  Pt also reports worsening SOB with exertion

## 2020-01-16 NOTE — ED NOTES
I performed a brief evaluation, including history and physical, of the patient here in triage and I have determined that pt will need further treatment and evaluation from the main side ER physician. I have placed initial orders to help in expediting patients care.      January 16, 2020 at 3:59 PM - Ronna Cronin PA-C        Visit Vitals  /67 (BP 1 Location: Left arm, BP Patient Position: Sitting)   Pulse 96   Temp 98.6 °F (37 °C)   Resp 18   SpO2 95%

## 2020-01-17 LAB
ATRIAL RATE: 97 BPM
CALCULATED R AXIS, ECG10: -31 DEGREES
CALCULATED T AXIS, ECG11: 38 DEGREES
DIAGNOSIS, 93000: NORMAL
Q-T INTERVAL, ECG07: 342 MS
QRS DURATION, ECG06: 86 MS
QTC CALCULATION (BEZET), ECG08: 425 MS
VENTRICULAR RATE, ECG03: 93 BPM

## 2020-02-20 ENCOUNTER — HOSPITAL ENCOUNTER (INPATIENT)
Age: 75
LOS: 4 days | Discharge: HOME | DRG: 690 | End: 2020-02-24
Attending: EMERGENCY MEDICINE | Admitting: FAMILY MEDICINE
Payer: MEDICARE

## 2020-02-20 ENCOUNTER — HOSPITAL ENCOUNTER (EMERGENCY)
Dept: CT IMAGING | Age: 75
Discharge: HOME | DRG: 690 | End: 2020-02-20
Attending: NURSE PRACTITIONER
Payer: MEDICARE

## 2020-02-20 DIAGNOSIS — N12 PYELONEPHRITIS: Primary | ICD-10-CM

## 2020-02-20 PROBLEM — N39.0 COMPLICATED URINARY TRACT INFECTION: Status: ACTIVE | Noted: 2020-02-20

## 2020-02-20 LAB
ALBUMIN SERPL-MCNC: 3.5 G/DL (ref 3.4–5)
ALBUMIN/GLOB SERPL: 0.7 {RATIO} (ref 0.8–1.7)
ALP SERPL-CCNC: 129 U/L (ref 45–117)
ALT SERPL-CCNC: 13 U/L (ref 13–56)
ANION GAP SERPL CALC-SCNC: 6 MMOL/L (ref 3–18)
AST SERPL-CCNC: 20 U/L (ref 10–38)
BASOPHILS # BLD: 0.1 K/UL (ref 0–0.1)
BASOPHILS NFR BLD: 1 % (ref 0–2)
BILIRUB SERPL-MCNC: 0.5 MG/DL (ref 0.2–1)
BUN SERPL-MCNC: 42 MG/DL (ref 7–18)
BUN/CREAT SERPL: 10 (ref 12–20)
CALCIUM SERPL-MCNC: 8.7 MG/DL (ref 8.5–10.1)
CHLORIDE SERPL-SCNC: 111 MMOL/L (ref 100–111)
CO2 SERPL-SCNC: 26 MMOL/L (ref 21–32)
CREAT SERPL-MCNC: 4.27 MG/DL (ref 0.6–1.3)
DIFFERENTIAL METHOD BLD: ABNORMAL
EOSINOPHIL # BLD: 0.2 K/UL (ref 0–0.4)
EOSINOPHIL NFR BLD: 3 % (ref 0–5)
ERYTHROCYTE [DISTWIDTH] IN BLOOD BY AUTOMATED COUNT: 16.4 % (ref 11.6–14.5)
GLOBULIN SER CALC-MCNC: 4.7 G/DL (ref 2–4)
GLUCOSE SERPL-MCNC: 120 MG/DL (ref 74–99)
HCT VFR BLD AUTO: 32.1 % (ref 35–45)
HGB BLD-MCNC: 10 G/DL (ref 12–16)
LYMPHOCYTES # BLD: 3.4 K/UL (ref 0.9–3.6)
LYMPHOCYTES NFR BLD: 39 % (ref 21–52)
MCH RBC QN AUTO: 30.6 PG (ref 24–34)
MCHC RBC AUTO-ENTMCNC: 31.2 G/DL (ref 31–37)
MCV RBC AUTO: 98.2 FL (ref 74–97)
MONOCYTES # BLD: 0.7 K/UL (ref 0.05–1.2)
MONOCYTES NFR BLD: 8 % (ref 3–10)
NEUTS SEG # BLD: 4.5 K/UL (ref 1.8–8)
NEUTS SEG NFR BLD: 49 % (ref 40–73)
PLATELET # BLD AUTO: 223 K/UL (ref 135–420)
PMV BLD AUTO: 10.2 FL (ref 9.2–11.8)
POTASSIUM SERPL-SCNC: 4.8 MMOL/L (ref 3.5–5.5)
PROT SERPL-MCNC: 8.2 G/DL (ref 6.4–8.2)
RBC # BLD AUTO: 3.27 M/UL (ref 4.2–5.3)
SODIUM SERPL-SCNC: 143 MMOL/L (ref 136–145)
WBC # BLD AUTO: 8.8 K/UL (ref 4.6–13.2)

## 2020-02-20 PROCEDURE — 81001 URINALYSIS AUTO W/SCOPE: CPT

## 2020-02-20 PROCEDURE — 74011000250 HC RX REV CODE- 250: Performed by: STUDENT IN AN ORGANIZED HEALTH CARE EDUCATION/TRAINING PROGRAM

## 2020-02-20 PROCEDURE — 51798 US URINE CAPACITY MEASURE: CPT

## 2020-02-20 PROCEDURE — 87086 URINE CULTURE/COLONY COUNT: CPT

## 2020-02-20 PROCEDURE — 77030019905 HC CATH URETH INTMIT MDII -A

## 2020-02-20 PROCEDURE — 74011250637 HC RX REV CODE- 250/637: Performed by: STUDENT IN AN ORGANIZED HEALTH CARE EDUCATION/TRAINING PROGRAM

## 2020-02-20 PROCEDURE — 87077 CULTURE AEROBIC IDENTIFY: CPT

## 2020-02-20 PROCEDURE — 74176 CT ABD & PELVIS W/O CONTRAST: CPT

## 2020-02-20 PROCEDURE — 85025 COMPLETE CBC W/AUTO DIFF WBC: CPT

## 2020-02-20 PROCEDURE — 65270000029 HC RM PRIVATE

## 2020-02-20 PROCEDURE — 74011250636 HC RX REV CODE- 250/636: Performed by: STUDENT IN AN ORGANIZED HEALTH CARE EDUCATION/TRAINING PROGRAM

## 2020-02-20 PROCEDURE — 75810000455 HC PLCMT CENT VENOUS CATH LVL 2 5182

## 2020-02-20 PROCEDURE — 36415 COLL VENOUS BLD VENIPUNCTURE: CPT

## 2020-02-20 PROCEDURE — 99284 EMERGENCY DEPT VISIT MOD MDM: CPT

## 2020-02-20 PROCEDURE — 80053 COMPREHEN METABOLIC PANEL: CPT

## 2020-02-20 PROCEDURE — 87186 SC STD MICRODIL/AGAR DIL: CPT

## 2020-02-20 RX ORDER — ALLOPURINOL 100 MG/1
200 TABLET ORAL DAILY
Status: DISCONTINUED | OUTPATIENT
Start: 2020-02-21 | End: 2020-02-24 | Stop reason: HOSPADM

## 2020-02-20 RX ORDER — SODIUM BICARBONATE 650 MG/1
1300 TABLET ORAL 3 TIMES DAILY
Status: DISCONTINUED | OUTPATIENT
Start: 2020-02-21 | End: 2020-02-24 | Stop reason: HOSPADM

## 2020-02-20 RX ORDER — FLUTICASONE PROPIONATE 50 MCG
2 SPRAY, SUSPENSION (ML) NASAL DAILY
Status: DISCONTINUED | OUTPATIENT
Start: 2020-02-21 | End: 2020-02-24 | Stop reason: HOSPADM

## 2020-02-20 RX ORDER — SODIUM CHLORIDE 9 MG/ML
100 INJECTION, SOLUTION INTRAVENOUS CONTINUOUS
Status: DISCONTINUED | OUTPATIENT
Start: 2020-02-21 | End: 2020-02-24 | Stop reason: HOSPADM

## 2020-02-20 RX ORDER — GABAPENTIN 100 MG/1
100 CAPSULE ORAL 3 TIMES DAILY
Status: DISCONTINUED | OUTPATIENT
Start: 2020-02-20 | End: 2020-02-24 | Stop reason: HOSPADM

## 2020-02-20 RX ORDER — CEFTRIAXONE 1 G/1
1 INJECTION, POWDER, FOR SOLUTION INTRAMUSCULAR; INTRAVENOUS EVERY 24 HOURS
Status: DISCONTINUED | OUTPATIENT
Start: 2020-02-20 | End: 2020-02-20 | Stop reason: CLARIF

## 2020-02-20 RX ORDER — LEVOTHYROXINE SODIUM 125 UG/1
125 TABLET ORAL
Status: DISCONTINUED | OUTPATIENT
Start: 2020-02-21 | End: 2020-02-24 | Stop reason: HOSPADM

## 2020-02-20 RX ORDER — PANTOPRAZOLE SODIUM 40 MG/1
40 TABLET, DELAYED RELEASE ORAL
Status: DISCONTINUED | OUTPATIENT
Start: 2020-02-21 | End: 2020-02-24 | Stop reason: HOSPADM

## 2020-02-20 RX ORDER — CARVEDILOL 12.5 MG/1
12.5 TABLET ORAL 2 TIMES DAILY WITH MEALS
Status: DISCONTINUED | OUTPATIENT
Start: 2020-02-21 | End: 2020-02-24 | Stop reason: HOSPADM

## 2020-02-20 RX ORDER — LATANOPROST 50 UG/ML
1 SOLUTION/ DROPS OPHTHALMIC
Status: DISCONTINUED | OUTPATIENT
Start: 2020-02-20 | End: 2020-02-24 | Stop reason: HOSPADM

## 2020-02-20 RX ORDER — CALCITRIOL 0.25 UG/1
0.25 CAPSULE ORAL EVERY OTHER DAY
Status: DISCONTINUED | OUTPATIENT
Start: 2020-02-20 | End: 2020-02-24 | Stop reason: HOSPADM

## 2020-02-20 RX ORDER — ONDANSETRON 4 MG/1
4 TABLET, ORALLY DISINTEGRATING ORAL
Status: DISCONTINUED | OUTPATIENT
Start: 2020-02-20 | End: 2020-02-24 | Stop reason: HOSPADM

## 2020-02-20 RX ORDER — ACETAMINOPHEN 325 MG/1
650 TABLET ORAL 2 TIMES DAILY
Status: DISCONTINUED | OUTPATIENT
Start: 2020-02-20 | End: 2020-02-24 | Stop reason: HOSPADM

## 2020-02-20 RX ORDER — CYCLOBENZAPRINE HCL 10 MG
5 TABLET ORAL DAILY
Status: DISCONTINUED | OUTPATIENT
Start: 2020-02-21 | End: 2020-02-21

## 2020-02-20 RX ORDER — PANTOPRAZOLE SODIUM 20 MG/1
20 TABLET, DELAYED RELEASE ORAL
Status: DISCONTINUED | OUTPATIENT
Start: 2020-02-20 | End: 2020-02-20

## 2020-02-20 RX ORDER — ASCORBIC ACID 250 MG
500 TABLET ORAL DAILY
Status: DISCONTINUED | OUTPATIENT
Start: 2020-02-21 | End: 2020-02-24 | Stop reason: HOSPADM

## 2020-02-20 RX ORDER — AMLODIPINE BESYLATE 5 MG/1
5 TABLET ORAL DAILY
Status: DISCONTINUED | OUTPATIENT
Start: 2020-02-21 | End: 2020-02-24 | Stop reason: HOSPADM

## 2020-02-20 RX ORDER — HEPARIN SODIUM 5000 [USP'U]/ML
5000 INJECTION, SOLUTION INTRAVENOUS; SUBCUTANEOUS EVERY 8 HOURS
Status: DISCONTINUED | OUTPATIENT
Start: 2020-02-20 | End: 2020-02-24 | Stop reason: HOSPADM

## 2020-02-20 RX ORDER — MELATONIN
4000 DAILY
Status: DISCONTINUED | OUTPATIENT
Start: 2020-02-21 | End: 2020-02-24 | Stop reason: HOSPADM

## 2020-02-20 RX ADMIN — CEFTRIAXONE SODIUM 1 G: 1 INJECTION, POWDER, FOR SOLUTION INTRAMUSCULAR; INTRAVENOUS at 22:44

## 2020-02-20 RX ADMIN — CALCITRIOL CAPSULES 0.25 MCG 0.25 MCG: 0.25 CAPSULE ORAL at 22:37

## 2020-02-20 RX ADMIN — ACETAMINOPHEN 650 MG: 325 TABLET ORAL at 22:38

## 2020-02-20 RX ADMIN — HEPARIN SODIUM 5000 UNITS: 5000 INJECTION INTRAVENOUS; SUBCUTANEOUS at 23:06

## 2020-02-20 RX ADMIN — LATANOPROST 1 DROP: 50 SOLUTION OPHTHALMIC at 22:38

## 2020-02-20 RX ADMIN — PANTOPRAZOLE SODIUM 20 MG: 20 TABLET, DELAYED RELEASE ORAL at 22:38

## 2020-02-20 RX ADMIN — SODIUM CHLORIDE 100 ML/HR: 900 INJECTION, SOLUTION INTRAVENOUS at 23:49

## 2020-02-20 NOTE — ED TRIAGE NOTES
Patient arrived to ER for evaluation of burning upon urination and blood in urine since December. Patient has a nephrostomy tube on the right side.

## 2020-02-20 NOTE — ED PROVIDER NOTES
EMERGENCY DEPARTMENT HISTORY AND PHYSICAL EXAM    6:25 PM      Date: 2/20/2020  Patient Name: Janie Archre    History of Presenting Illness     Chief Complaint   Patient presents with    Urinary Pain    Blood in Urine     History Provided By: Patient  Chief complaint: sent to ED by nephrologist for possible worsening renal function possibly needing HD as well as  UTI/Pylo failed OP treatment. Duration: 2-3 month  Timing: on going   Location:   Quality: n/a  Severity : n/a  Modifying factors : none. Associated Symptoms: chills, no fevers. No n/v. No abd pain. Additional History (Context): Janie Archer is a 66 y.o. female with past medical history significant for CKD stage 4 not on HD, recurrent UTIs with multiorgan resistance, Rt nephrostomy tube, recurrent C. Diff and dyslipidemia. She is followed by nephrology Dr. Alfreda Ortiz who is requesting patient be admitted for IV abx treatment. PCP: Lee Aguayo MD    Current Facility-Administered Medications   Medication Dose Route Frequency Provider Last Rate Last Dose    [START ON 2/21/2020] lactobacillus sp. 50 billion cpu (BIO-K PLUS) capsule 1 Cap  1 Cap Oral DAILY Cleve Edwards NP        cefTRIAXone (ROCEPHIN) 1 g in sterile water (preservative free) 10 mL IV syringe  1 g IntraVENous Q24H Cleve Edwards NP         Current Outpatient Medications   Medication Sig Dispense Refill    acetaminophen (TYLENOL) 325 mg tablet Take 650 mg by mouth two (2) times a day.  allopurinoL (ZYLOPRIM) 100 mg tablet Take 200 mg by mouth daily.  amLODIPine (NORVASC) 5 mg tablet Take 5 mg by mouth daily.  ascorbic acid, vitamin C, (VITAMIN C) 500 mg tablet Take 500 mg by mouth.  calcitRIOL (ROCALTROL) 0.25 mcg capsule Take 0.25 mcg by mouth every other day.  carvediloL (COREG) 12.5 mg tablet Take  by mouth two (2) times daily (with meals).       cholecalciferol (VITAMIN D3) (2,000 UNITS /50 MCG) cap capsule Take 2,000 Units by mouth two (2) times a day. Take two tabs a total of 4000 units      cyclobenzaprine (FLEXERIL) 5 mg tablet Take 5 mg by mouth daily.  gabapentin (NEURONTIN) 100 mg capsule Take  by mouth three (3) times daily.  latanoprost (XALATAN) 0.005 % ophthalmic solution Administer 1 Drop to both eyes nightly. One drop at bedtime      levothyroxine (SYNTHROID) 125 mcg tablet Take 125 mcg by mouth Daily (before breakfast).  meclizine (ANTIVERT) 25 mg tablet Take  by mouth three (3) times daily as needed for Dizziness.  omeprazole (PRILOSEC) 20 mg capsule Take 20 mg by mouth daily.  ondansetron (ZOFRAN ODT) 4 mg disintegrating tablet Take 4 mg by mouth every eight (8) hours as needed for Nausea or Vomiting.  B.infantis-B.ani-B.long-B.bifi (PROBIOTIC 4X) 10-15 mg TbEC Take  by mouth.  traMADol (ULTRAM) 50 mg tablet Take 50 mg by mouth two (2) times daily as needed for Pain.  vit B Cmplx 3-FA-Vit C-Biotin (NEPHRO HOWIE RX) 1- mg-mg-mcg tablet Take 1 Tab by mouth daily. Past History     Past Medical History:  No past medical history on file. Past Surgical History:  No past surgical history on file. Family History:  No family history on file. Social History:  Social History     Tobacco Use    Smoking status: Never Smoker    Smokeless tobacco: Never Used   Substance Use Topics    Alcohol use: Never     Frequency: Never    Drug use: Never       Allergies: Allergies   Allergen Reactions    Ciprofloxacin Hives    Statins-Hmg-Coa Reductase Inhibitors Other (comments)     Body ache         Review of Systems       Review of Systems   Constitutional: Positive for chills. Negative for diaphoresis, fatigue and fever. HENT: Negative for congestion, ear pain and sore throat. Eyes: Negative for pain. Respiratory: Negative for cough, chest tightness, shortness of breath and wheezing. Cardiovascular: Negative for chest pain, palpitations and leg swelling.    Gastrointestinal: Negative for abdominal pain, constipation, diarrhea, nausea and vomiting. Genitourinary: Positive for difficulty urinating, dysuria, flank pain and hematuria. Negative for pelvic pain, vaginal bleeding and vaginal discharge. Musculoskeletal: Negative for back pain, joint swelling, neck pain and neck stiffness. Neurological: Negative for dizziness, weakness, light-headedness and headaches. Physical Exam     Visit Vitals  Pulse 92   Temp 98 °F (36.7 °C)   Resp 16   SpO2 97%         Physical Exam  Vitals signs and nursing note reviewed. Constitutional:       General: She is not in acute distress. Appearance: Normal appearance. She is obese. She is ill-appearing. She is not toxic-appearing or diaphoretic. HENT:      Head: Normocephalic and atraumatic. Eyes:      Conjunctiva/sclera: Conjunctivae normal.   Neck:      Musculoskeletal: Normal range of motion and neck supple. No neck rigidity or muscular tenderness. Cardiovascular:      Rate and Rhythm: Normal rate and regular rhythm. Pulses: Normal pulses. Heart sounds: Normal heart sounds. No murmur. Pulmonary:      Effort: Pulmonary effort is normal. No respiratory distress. Breath sounds: Normal breath sounds. No wheezing. Abdominal:      General: Bowel sounds are normal. There is no distension. Palpations: Abdomen is soft. Tenderness: There is no abdominal tenderness. There is left CVA tenderness. Musculoskeletal: Normal range of motion. Skin:     General: Skin is warm and dry. Neurological:      General: No focal deficit present. Mental Status: She is alert and oriented to person, place, and time. Psychiatric:         Behavior: Behavior normal.       Diagnostic Study Results     Labs -  No results found for this or any previous visit (from the past 12 hour(s)).     Radiologic Studies -   CT ABD PELV WO CONT   Final Result   IMPRESSION:       Right kidney shows no dilation of the collecting system with nephrostomy in   place. Bilateral renal cysts. Alcohol the renal hypodensities characterized on this   noncontrast study; small solid nodule not excluded   Severe edema of the bladder and perivesical region. There is diverticulosis without diverticulitis                  . All CT scans at this facility are performed using dose optimization technique as   appropriate to the performed exam, to include automated exposure control,   adjustment of the mA and/or kV according to patient's size (Including   appropriate matching for site-specific examinations), or use of iterative   reconstruction technique. Medical Decision Making   I am the first provider for this patient. I reviewed the vital signs, available nursing notes, past medical history, past surgical history, family history and social history. Vital Signs-Reviewed the patient's vital signs. Records Reviewed: Nursing Notes and Old Medical Records (Time of Review: 6:25 PM)    ED Course: Progress Notes, Reevaluation, and Consults:    Provider Notes (Medical Decision Making): This is a 66year old female with past medical history significant for CKD stage 4 not on HD, recurrent UTIs with multiorgan resistance, Rt nephrostomy tube, recurrent C. Diff and dyslipidemia. She presented to the ED as instructed by her nephrologist for possible worsening renal function possibly needing HD as well as  UTI/Pylo failed OP treatment. Her vital signs are stable. She is non-toxic appearing. She has CVA tenderness on exam, no pelvic or abd tenderness. CT without contrast with right nephrostomy tube in place, bilateral renal cysts, severe edema of bladder and perivesical region, diverticulosis without diverticulitis. 5:30 PM: dicussed case with Dr. Simi Espinoza Nephrology who recommends ID consult for abx management, CBC, CMP, urinalysis, urine culture, and CT w/o contrast. He is also recommending inpatient/ observation at this time.      5:30 PM: discussed case with Dr. Pauly Odom, ID who recommends ceftriaxone daily, probiotic, and follow cultures. Diagnosis     Clinical Impression:   1. Pyelonephritis        Follow-up Information    None          Patient's Medications   Start Taking    No medications on file   Continue Taking    ACETAMINOPHEN (TYLENOL) 325 MG TABLET    Take 650 mg by mouth two (2) times a day. ALLOPURINOL (ZYLOPRIM) 100 MG TABLET    Take 200 mg by mouth daily. AMLODIPINE (NORVASC) 5 MG TABLET    Take 5 mg by mouth daily. ASCORBIC ACID, VITAMIN C, (VITAMIN C) 500 MG TABLET    Take 500 mg by mouth. B.INFANTIS-B. ANI-B. LONG-B.BIFI (PROBIOTIC 4X) 10-15 MG TBEC    Take  by mouth. CALCITRIOL (ROCALTROL) 0.25 MCG CAPSULE    Take 0.25 mcg by mouth every other day. CARVEDILOL (COREG) 12.5 MG TABLET    Take  by mouth two (2) times daily (with meals). CHOLECALCIFEROL (VITAMIN D3) (2,000 UNITS /50 MCG) CAP CAPSULE    Take 2,000 Units by mouth two (2) times a day. Take two tabs a total of 4000 units    CYCLOBENZAPRINE (FLEXERIL) 5 MG TABLET    Take 5 mg by mouth daily. GABAPENTIN (NEURONTIN) 100 MG CAPSULE    Take  by mouth three (3) times daily. LATANOPROST (XALATAN) 0.005 % OPHTHALMIC SOLUTION    Administer 1 Drop to both eyes nightly. One drop at bedtime    LEVOTHYROXINE (SYNTHROID) 125 MCG TABLET    Take 125 mcg by mouth Daily (before breakfast). MECLIZINE (ANTIVERT) 25 MG TABLET    Take  by mouth three (3) times daily as needed for Dizziness. OMEPRAZOLE (PRILOSEC) 20 MG CAPSULE    Take 20 mg by mouth daily. ONDANSETRON (ZOFRAN ODT) 4 MG DISINTEGRATING TABLET    Take 4 mg by mouth every eight (8) hours as needed for Nausea or Vomiting. TRAMADOL (ULTRAM) 50 MG TABLET    Take 50 mg by mouth two (2) times daily as needed for Pain. VIT B CMPLX 3-FA-VIT C-BIOTIN (NEPHRO HOWIE RX) 1- MG-MG-MCG TABLET    Take 1 Tab by mouth daily.    These Medications have changed    No medications on file   Stop Taking    No medications on file       Dictation disclaimer:  Please note that this dictation was completed with [employer], the computer voice recognition software. Quite often unanticipated grammatical, syntax, homophones, and other interpretive errors are inadvertently transcribed by the computer software. Please disregard these errors. Please excuse any errors that have escaped final proofreading.

## 2020-02-20 NOTE — ED NOTES
I performed a brief evaluation, including history and physical, of the patient here in triage and I have determined that pt will need further treatment and evaluation from the main side ER physician. I have placed initial orders to help in expediting patients care. February 20, 2020 at 5:04 PM - Ronna Cronin PA-C      Hx CKD stage 4, not yet on dialysis, hx of recurrent UTIs, Rt nephrostomy. Sent by PCP for evaluation for possible UTI needing IV antibiotics per pt. Pt with dysuria, hematuria.       Visit Vitals  Pulse 92   Temp 98 °F (36.7 °C)   Resp 16   SpO2 97%

## 2020-02-21 ENCOUNTER — HOSPITAL ENCOUNTER (OUTPATIENT)
Dept: INTERVENTIONAL RADIOLOGY/VASCULAR | Age: 75
Discharge: HOME | DRG: 690 | End: 2020-02-21
Attending: PHYSICIAN ASSISTANT
Payer: MEDICARE

## 2020-02-21 ENCOUNTER — APPOINTMENT (OUTPATIENT)
Dept: GENERAL RADIOLOGY | Age: 75
DRG: 690 | End: 2020-02-21
Attending: STUDENT IN AN ORGANIZED HEALTH CARE EDUCATION/TRAINING PROGRAM
Payer: MEDICARE

## 2020-02-21 VITALS
SYSTOLIC BLOOD PRESSURE: 128 MMHG | HEART RATE: 86 BPM | DIASTOLIC BLOOD PRESSURE: 59 MMHG | OXYGEN SATURATION: 100 % | RESPIRATION RATE: 21 BRPM

## 2020-02-21 LAB
ALBUMIN SERPL-MCNC: 2.7 G/DL (ref 3.4–5)
ALBUMIN/GLOB SERPL: 0.7 {RATIO} (ref 0.8–1.7)
ALP SERPL-CCNC: 114 U/L (ref 45–117)
ALT SERPL-CCNC: 10 U/L (ref 13–56)
ANION GAP SERPL CALC-SCNC: 5 MMOL/L (ref 3–18)
APPEARANCE UR: ABNORMAL
AST SERPL-CCNC: 16 U/L (ref 10–38)
BACTERIA URNS QL MICRO: ABNORMAL /HPF
BASOPHILS # BLD: 0 K/UL (ref 0–0.1)
BASOPHILS NFR BLD: 1 % (ref 0–2)
BILIRUB SERPL-MCNC: 0.4 MG/DL (ref 0.2–1)
BILIRUB UR QL: NEGATIVE
BUN SERPL-MCNC: 43 MG/DL (ref 7–18)
BUN/CREAT SERPL: 11 (ref 12–20)
CALCIUM SERPL-MCNC: 8 MG/DL (ref 8.5–10.1)
CALCIUM SERPL-MCNC: 8.3 MG/DL (ref 8.5–10.1)
CHLORIDE SERPL-SCNC: 112 MMOL/L (ref 100–111)
CO2 SERPL-SCNC: 26 MMOL/L (ref 21–32)
COLOR UR: YELLOW
CREAT SERPL-MCNC: 4.06 MG/DL (ref 0.6–1.3)
DIFFERENTIAL METHOD BLD: ABNORMAL
EOSINOPHIL # BLD: 0.2 K/UL (ref 0–0.4)
EOSINOPHIL NFR BLD: 3 % (ref 0–5)
EPITH CASTS URNS QL MICRO: ABNORMAL /LPF (ref 0–5)
ERYTHROCYTE [DISTWIDTH] IN BLOOD BY AUTOMATED COUNT: 16.3 % (ref 11.6–14.5)
EST. AVERAGE GLUCOSE BLD GHB EST-MCNC: ABNORMAL MG/DL
FERRITIN SERPL-MCNC: 81 NG/ML (ref 8–388)
GLOBULIN SER CALC-MCNC: 3.9 G/DL (ref 2–4)
GLUCOSE SERPL-MCNC: 103 MG/DL (ref 74–99)
GLUCOSE UR STRIP.AUTO-MCNC: NEGATIVE MG/DL
HBA1C MFR BLD: <3.5 % (ref 4.2–5.6)
HCT VFR BLD AUTO: 27.4 % (ref 35–45)
HGB BLD-MCNC: 8.5 G/DL (ref 12–16)
HGB UR QL STRIP: ABNORMAL
IRON SATN MFR SERPL: 22 % (ref 20–50)
IRON SERPL-MCNC: 57 UG/DL (ref 50–175)
KETONES UR QL STRIP.AUTO: NEGATIVE MG/DL
LEUKOCYTE ESTERASE UR QL STRIP.AUTO: ABNORMAL
LYMPHOCYTES # BLD: 3.2 K/UL (ref 0.9–3.6)
LYMPHOCYTES NFR BLD: 53 % (ref 21–52)
MCH RBC QN AUTO: 30.5 PG (ref 24–34)
MCHC RBC AUTO-ENTMCNC: 31 G/DL (ref 31–37)
MCV RBC AUTO: 98.2 FL (ref 74–97)
MONOCYTES # BLD: 0.6 K/UL (ref 0.05–1.2)
MONOCYTES NFR BLD: 9 % (ref 3–10)
NEUTS SEG # BLD: 2.1 K/UL (ref 1.8–8)
NEUTS SEG NFR BLD: 34 % (ref 40–73)
NITRITE UR QL STRIP.AUTO: NEGATIVE
PH UR STRIP: 6.5 [PH] (ref 5–8)
PLATELET # BLD AUTO: 179 K/UL (ref 135–420)
PMV BLD AUTO: 11 FL (ref 9.2–11.8)
POTASSIUM SERPL-SCNC: 4.8 MMOL/L (ref 3.5–5.5)
PROT SERPL-MCNC: 6.6 G/DL (ref 6.4–8.2)
PROT UR STRIP-MCNC: 100 MG/DL
PTH-INTACT SERPL-MCNC: 286.2 PG/ML (ref 18.4–88)
RBC # BLD AUTO: 2.79 M/UL (ref 4.2–5.3)
RBC #/AREA URNS HPF: ABNORMAL /HPF (ref 0–5)
SODIUM SERPL-SCNC: 143 MMOL/L (ref 136–145)
SP GR UR REFRACTOMETRY: 1.01 (ref 1–1.03)
TIBC SERPL-MCNC: 258 UG/DL (ref 250–450)
UROBILINOGEN UR QL STRIP.AUTO: 1 EU/DL (ref 0.2–1)
WBC # BLD AUTO: 6 K/UL (ref 4.6–13.2)
WBC URNS QL MICRO: ABNORMAL /HPF (ref 0–5)

## 2020-02-21 PROCEDURE — 74011250637 HC RX REV CODE- 250/637: Performed by: NURSE PRACTITIONER

## 2020-02-21 PROCEDURE — 51798 US URINE CAPACITY MEASURE: CPT

## 2020-02-21 PROCEDURE — C2628 CATHETER, OCCLUSION: HCPCS

## 2020-02-21 PROCEDURE — C1769 GUIDE WIRE: HCPCS

## 2020-02-21 PROCEDURE — C1894 INTRO/SHEATH, NON-LASER: HCPCS

## 2020-02-21 PROCEDURE — 50435 EXCHANGE NEPHROSTOMY CATH: CPT

## 2020-02-21 PROCEDURE — 0T25X0Z CHANGE DRAINAGE DEVICE IN KIDNEY, EXTERNAL APPROACH: ICD-10-PCS | Performed by: RADIOLOGY

## 2020-02-21 PROCEDURE — 74011000250 HC RX REV CODE- 250: Performed by: STUDENT IN AN ORGANIZED HEALTH CARE EDUCATION/TRAINING PROGRAM

## 2020-02-21 PROCEDURE — C1874 STENT, COATED/COV W/DEL SYS: HCPCS

## 2020-02-21 PROCEDURE — 74011250637 HC RX REV CODE- 250/637: Performed by: STUDENT IN AN ORGANIZED HEALTH CARE EDUCATION/TRAINING PROGRAM

## 2020-02-21 PROCEDURE — 74011250636 HC RX REV CODE- 250/636: Performed by: STUDENT IN AN ORGANIZED HEALTH CARE EDUCATION/TRAINING PROGRAM

## 2020-02-21 PROCEDURE — 74011000250 HC RX REV CODE- 250: Performed by: PHYSICIAN ASSISTANT

## 2020-02-21 PROCEDURE — 74011250636 HC RX REV CODE- 250/636: Performed by: PHYSICIAN ASSISTANT

## 2020-02-21 PROCEDURE — 85025 COMPLETE CBC W/AUTO DIFF WBC: CPT

## 2020-02-21 PROCEDURE — 83540 ASSAY OF IRON: CPT

## 2020-02-21 PROCEDURE — 83970 ASSAY OF PARATHORMONE: CPT

## 2020-02-21 PROCEDURE — 65270000029 HC RM PRIVATE

## 2020-02-21 PROCEDURE — C1893 INTRO/SHEATH, FIXED,NON-PEEL: HCPCS

## 2020-02-21 PROCEDURE — 83036 HEMOGLOBIN GLYCOSYLATED A1C: CPT

## 2020-02-21 PROCEDURE — 71046 X-RAY EXAM CHEST 2 VIEWS: CPT

## 2020-02-21 PROCEDURE — 77030025702 HC BG URIN DRN MRTM -A

## 2020-02-21 PROCEDURE — 36415 COLL VENOUS BLD VENIPUNCTURE: CPT

## 2020-02-21 PROCEDURE — 80053 COMPREHEN METABOLIC PANEL: CPT

## 2020-02-21 PROCEDURE — 74011636320 HC RX REV CODE- 636/320: Performed by: PHYSICIAN ASSISTANT

## 2020-02-21 PROCEDURE — 77030004561 HC CATH ANGI DX COBRA ANGI -B

## 2020-02-21 PROCEDURE — 82728 ASSAY OF FERRITIN: CPT

## 2020-02-21 RX ORDER — LIDOCAINE HYDROCHLORIDE 10 MG/ML
30 INJECTION, SOLUTION EPIDURAL; INFILTRATION; INTRACAUDAL; PERINEURAL ONCE
Status: COMPLETED | OUTPATIENT
Start: 2020-02-21 | End: 2020-02-21

## 2020-02-21 RX ORDER — CEFAZOLIN SODIUM 2 G/50ML
2 SOLUTION INTRAVENOUS
Status: COMPLETED | OUTPATIENT
Start: 2020-02-21 | End: 2020-02-21

## 2020-02-21 RX ORDER — FENTANYL CITRATE 50 UG/ML
12.5-5 INJECTION, SOLUTION INTRAMUSCULAR; INTRAVENOUS
Status: DISCONTINUED | OUTPATIENT
Start: 2020-02-21 | End: 2020-02-21 | Stop reason: SDUPTHER

## 2020-02-21 RX ORDER — CEFAZOLIN SODIUM 2 G/50ML
2 SOLUTION INTRAVENOUS ONCE
Status: DISCONTINUED | OUTPATIENT
Start: 2020-02-21 | End: 2020-02-21 | Stop reason: SDUPTHER

## 2020-02-21 RX ORDER — CYCLOBENZAPRINE HCL 10 MG
5 TABLET ORAL DAILY
Status: DISCONTINUED | OUTPATIENT
Start: 2020-02-21 | End: 2020-02-24 | Stop reason: HOSPADM

## 2020-02-21 RX ORDER — FENTANYL CITRATE 50 UG/ML
12.5-5 INJECTION, SOLUTION INTRAMUSCULAR; INTRAVENOUS
Status: DISCONTINUED | OUTPATIENT
Start: 2020-02-21 | End: 2020-02-21 | Stop reason: ALTCHOICE

## 2020-02-21 RX ORDER — TAMSULOSIN HYDROCHLORIDE 0.4 MG/1
0.4 CAPSULE ORAL DAILY
Status: DISCONTINUED | OUTPATIENT
Start: 2020-02-21 | End: 2020-02-24 | Stop reason: HOSPADM

## 2020-02-21 RX ORDER — MIDAZOLAM HYDROCHLORIDE 1 MG/ML
1 INJECTION, SOLUTION INTRAMUSCULAR; INTRAVENOUS
Status: DISCONTINUED | OUTPATIENT
Start: 2020-02-21 | End: 2020-02-21 | Stop reason: SDUPTHER

## 2020-02-21 RX ORDER — MIDAZOLAM HYDROCHLORIDE 1 MG/ML
1 INJECTION, SOLUTION INTRAMUSCULAR; INTRAVENOUS
Status: DISCONTINUED | OUTPATIENT
Start: 2020-02-21 | End: 2020-02-21 | Stop reason: ALTCHOICE

## 2020-02-21 RX ADMIN — LATANOPROST 1 DROP: 50 SOLUTION OPHTHALMIC at 22:59

## 2020-02-21 RX ADMIN — VITAMIN D, TAB 1000IU (100/BT) 4 TABLET: 25 TAB at 08:42

## 2020-02-21 RX ADMIN — PANTOPRAZOLE SODIUM 40 MG: 40 TABLET, DELAYED RELEASE ORAL at 06:36

## 2020-02-21 RX ADMIN — SODIUM BICARBONATE 650 MG TABLET 1300 MG: at 08:42

## 2020-02-21 RX ADMIN — SODIUM CHLORIDE 100 ML/HR: 900 INJECTION, SOLUTION INTRAVENOUS at 10:53

## 2020-02-21 RX ADMIN — IOHEXOL 50 ML: 300 INJECTION, SOLUTION INTRAVENOUS at 15:15

## 2020-02-21 RX ADMIN — CARVEDILOL 12.5 MG: 12.5 TABLET, FILM COATED ORAL at 17:14

## 2020-02-21 RX ADMIN — NEPHROCAP 1 CAPSULE: 1 CAP ORAL at 08:42

## 2020-02-21 RX ADMIN — LIDOCAINE HYDROCHLORIDE 30 ML: 10 INJECTION, SOLUTION EPIDURAL; INFILTRATION; INTRACAUDAL; PERINEURAL at 15:00

## 2020-02-21 RX ADMIN — TAMSULOSIN HYDROCHLORIDE 0.4 MG: 0.4 CAPSULE ORAL at 13:35

## 2020-02-21 RX ADMIN — SODIUM BICARBONATE 650 MG TABLET 1300 MG: at 21:35

## 2020-02-21 RX ADMIN — CEFTRIAXONE SODIUM 1 G: 1 INJECTION, POWDER, FOR SOLUTION INTRAMUSCULAR; INTRAVENOUS at 17:31

## 2020-02-21 RX ADMIN — ACETAMINOPHEN 650 MG: 325 TABLET ORAL at 08:43

## 2020-02-21 RX ADMIN — Medication 500 MG: at 08:43

## 2020-02-21 RX ADMIN — FLUTICASONE PROPIONATE 2 SPRAY: 50 SPRAY, METERED NASAL at 08:49

## 2020-02-21 RX ADMIN — Medication 1 CAPSULE: at 08:43

## 2020-02-21 RX ADMIN — SODIUM CHLORIDE 100 ML/HR: 900 INJECTION, SOLUTION INTRAVENOUS at 22:56

## 2020-02-21 RX ADMIN — CEFAZOLIN SODIUM 2 G: 2 SOLUTION INTRAVENOUS at 15:00

## 2020-02-21 RX ADMIN — HEPARIN SODIUM 5000 UNITS: 5000 INJECTION INTRAVENOUS; SUBCUTANEOUS at 22:57

## 2020-02-21 RX ADMIN — CYCLOBENZAPRINE 5 MG: 10 TABLET, FILM COATED ORAL at 00:46

## 2020-02-21 RX ADMIN — LEVOTHYROXINE SODIUM 125 MCG: 0.1 TABLET ORAL at 06:35

## 2020-02-21 RX ADMIN — SODIUM BICARBONATE 650 MG TABLET 1300 MG: at 17:09

## 2020-02-21 RX ADMIN — HEPARIN SODIUM 5000 UNITS: 5000 INJECTION INTRAVENOUS; SUBCUTANEOUS at 06:36

## 2020-02-21 RX ADMIN — ACETAMINOPHEN 650 MG: 325 TABLET ORAL at 17:09

## 2020-02-21 NOTE — ED NOTES
8:10 PM  Left external jugular IV placement  Date/Time: 2/20/2020 8:10 PM  Performed by: NOVA Mcdonald  Authorized by: NOVA Mcdonald     Consent:     Consent obtained:  Verbal    Consent given by:  Patient    Risks discussed:  Pain    Alternatives discussed:  Alternative treatment  Indications:     Indications:  No peripheral iv  Pre-procedure details:     Skin preparation:  ChloraPrep  Anesthesia (see MAR for exact dosages): Anesthesia method:  None  Post-procedure details:     Patient tolerance of procedure:   Tolerated well, no immediate complications    sia harris

## 2020-02-21 NOTE — PROCEDURES
Interventional Radiology Brief Procedure Note    Patient: Samson Patient MRN: [id number]  SSN: [id number]    YOB: 1945  Age: 66 y.o. Sex: female      Date of Procedure: 2/21/2020    Procedure(s): Percutaneous Nephrostomy Tube Exchange    Performed By: NOVA Albrecht    Anesthesia: Lidocaine    Estimated Blood Loss: Less than 1ml    Specimens: None    Findings:     - Written and verbal informed consent was obtained. - Time Out was performed. - Permanent image storage performed on PACS. - Image-guided right PCN exchange performed using maximum barrier precautions and sterile technique. - Please refer to report on PACS for full details. Implants: None    Plan: Routine exchange every 3 months.      Signed By: Melany Albrecht     February 21, 2020

## 2020-02-21 NOTE — PROGRESS NOTES
conducted an initial consultation and Spiritual Assessment for Stevie Hubbard, who is a 66 y.o.,female. Patient's Primary Language is: [place]. According to the patient's EMR Episcopalian Affiliation is: No Islam. The reason the Patient came to the hospital is:   Patient Active Problem List    Diagnosis Date Noted    Pyelonephritis 40/78/1240    Complicated urinary tract infection 02/20/2020        The  provided the following Interventions:  Initiated a relationship of care and support. Explored issues of valentina, belief, spirituality and Temple/ritual needs while hospitalized. Listened empathically. Provided chaplaincy education. Provided information about Spiritual Care Services. Offered prayer and assurance of continued prayers on patient's behalf. Chart reviewed. The following outcomes where achieved:  Patient shared limited information about both their medical narrative and spiritual journey/beliefs.  confirmed Patient's Episcopalian Affiliation. Patient processed feeling about current hospitalization. Patient expressed gratitude for 's visit. Assessment:  Patient does not have any Temple/cultural needs that will affect patient's preferences in health care. There are no spiritual or Temple issues which require intervention at this time. Plan:  Chaplains will continue to follow and will provide pastoral care on an as needed/requested basis.  recommends bedside caregivers page  on duty if patient shows signs of acute spiritual or emotional distress.     [id number]   [phone]

## 2020-02-21 NOTE — CONSULTS
Infectious Disease Consultation Note      Reason: cystitis, h/o c.diff    Current abx Prior abx   Ceftriaxone since 2/20/20      Lines:       Assessment :    66 y.o. 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 E.coli bloodstream infection in 3/2019 (pan susceptible e.coli)    Acinetobacter UTI in 4/2019 -  (intermediately susceptible to ceftriaxone, ceftazidime, pip/tazo)    Clinical presentation c/w early hemorrhagic cystitis     Clinically better. Resolved hematuria. H/o c.diff - hence, will attempt to treat patient with a short course of antibiotics    Right PCNL. No h ydronephrosis noted on Ct scan 2/20/20. +nt edema of bladder c/w cystitis    Patient has documented h/o ciprofloxacin allergy. She has tolerated ciprofloxacin on multiple occasions in the past. About a year ago, she had hives when she was given ciprofloxacin. Patient wishes to try ciprofloxacin again if needed. Recommendations:    1. Recommend ceftriaxone  2. Give probiotics while on abx  3. F/u urine cultures  4. F/u urology recommendations  5. Will switch to po abx based on urine cultures    Advance Care planning:DNR: discussed  with patient/surrogate decision maker - Oleksandr mabry- [phone]    Thank you for consultation request. Above plan was discussed in details with patient, and dr Selin Herndon. Please call me if any further questions or concerns. Will continue to participate in the care of this patient.   HPI:    66 y.o. 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     She was seen  at [facility] office yesterday for establishment of care and was sent to the ED. The nephrology who was consulted from the outpatient office wanted her admitted for failed o/p Pyelo treatment and possible HD. She lived in [place], [place] and came to [facility] in 12/2019 to meet her son. She didn't go back to [place] since she was not feeling too good. She has been having 2 month hx of worsening suprapubic pressure like pain, burning accompanied with straining and bladder spasms. she was seen in ed a month ago for this c/o. Given bactrim. took it for 2 days. Stopped taking it since she feared her c.diff will return. Yesterday she noted pinkish tinge to the urine . Denies any fever but complains of 1x vomiting. Per records, she had Urology appointment scheduled with Dr. Delores Hung on 03/22/2020. As per patient, she is going to go to [place] in April to have her PCNL exchanged since she doesn't have [place] insurance. Her UA reveals significant pyuria. I have been consulted for further recommendations. Patient denies headaches, visual disturbances, sore throat, runny nose, earaches, cp, sob, chills, cough, abdominal pain, diarrhea,  pain or weakness in extremities. has intermittent right flank pain for a long time - no recent worsening. No past medical history on file. No past surgical history on file.    home Medication List    Details   cyclobenzaprine (FLEXERIL) 5 mg tablet Take 5 mg by mouth daily. acetaminophen (TYLENOL) 325 mg tablet Take 650 mg by mouth two (2) times a day. allopurinoL (ZYLOPRIM) 100 mg tablet Take 200 mg by mouth daily. amLODIPine (NORVASC) 5 mg tablet Take 5 mg by mouth daily. ascorbic acid, vitamin C, (VITAMIN C) 500 mg tablet Take 500 mg by mouth.      calcitRIOL (ROCALTROL) 0.25 mcg capsule Take 0.25 mcg by mouth every other day. carvediloL (COREG) 12.5 mg tablet Take  by mouth two (2) times daily (with meals).       cholecalciferol (VITAMIN D3) (2,000 UNITS /50 MCG) cap capsule Take 2,000 Units by mouth two (2) times a day. Take two tabs a total of 4000 units      gabapentin (NEURONTIN) 100 mg capsule Take 100 mg by mouth nightly. latanoprost (XALATAN) 0.005 % ophthalmic solution Administer 1 Drop to both eyes nightly. One drop at bedtime      levothyroxine (SYNTHROID) 125 mcg tablet Take 125 mcg by mouth Daily (before breakfast). omeprazole (PRILOSEC) 20 mg capsule Take 20 mg by mouth daily. ondansetron (ZOFRAN ODT) 4 mg disintegrating tablet Take 4 mg by mouth every eight (8) hours as needed for Nausea or Vomiting. B.infantis-B.ani-B.long-B.bifi (PROBIOTIC 4X) 10-15 mg TbEC Take  by mouth. vit B Cmplx 3-FA-Vit C-Biotin (NEPHRO HOWIE RX) 1- mg-mg-mcg tablet Take 1 Tab by mouth daily.           Current Facility-Administered Medications   Medication Dose Route Frequency    cyclobenzaprine (FLEXERIL) tablet 5 mg  5 mg Oral DAILY    lactobacillus sp. 50 billion cpu (BIO-K PLUS) capsule 1 Cap  1 Cap Oral DAILY    cefTRIAXone (ROCEPHIN) 1 g in sterile water (preservative free) 10 mL IV syringe  1 g IntraVENous Q24H    acetaminophen (TYLENOL) tablet 650 mg  650 mg Oral BID    [Held by provider] allopurinoL (ZYLOPRIM) tablet 200 mg  200 mg Oral DAILY    amLODIPine (NORVASC) tablet 5 mg  5 mg Oral DAILY    ascorbic acid (vitamin C) (VITAMIN C) tablet 500 mg  500 mg Oral DAILY    calcitRIOL (ROCALTROL) capsule 0.25 mcg  0.25 mcg Oral EVERY OTHER DAY    carvediloL (COREG) tablet 12.5 mg  12.5 mg Oral BID WITH MEALS    cholecalciferol (VITAMIN D3) (1000 Units /25 mcg) tablet 4 Tab  4,000 Units Oral DAILY    [Held by provider] gabapentin (NEURONTIN) capsule 100 mg  100 mg Oral TID    latanoprost (XALATAN) 0.005 % ophthalmic solution 1 Drop  1 Drop Both Eyes QHS    levothyroxine (SYNTHROID) tablet 125 mcg  125 mcg Oral ACB    ondansetron (ZOFRAN ODT) tablet 4 mg  4 mg Oral Q8H PRN    B complex-vitaminC-folic acid (NEPHROCAP) cap  1 Cap Oral DAILY    fluticasone propionate (FLONASE) 50 mcg/actuation nasal spray 2 Spray  2 Spray Both Nostrils DAILY    heparin (porcine) injection 5,000 Units  5,000 Units SubCUTAneous Q8H    0.9% sodium chloride infusion  100 mL/hr IntraVENous CONTINUOUS    pantoprazole (PROTONIX) tablet 40 mg  40 mg Oral ACB    sodium bicarbonate tablet 1,300 mg  1,300 mg Oral TID       Allergies: Ciprofloxacin and Statins-hmg-coa reductase inhibitors    No family history on file.   Social History     Socioeconomic History    Marital status: SINGLE     Spouse name: Not on file    Number of children: Not on file    Years of education: Not on file    Highest education level: Not on file   Occupational History    Not on file   Social Needs    Financial resource strain: Not on file    Food insecurity:     Worry: Not on file     Inability: Not on file    Transportation needs:     Medical: Not on file     Non-medical: Not on file   Tobacco Use    Smoking status: Never Smoker    Smokeless tobacco: Never Used   Substance and Sexual Activity    Alcohol use: Never     Frequency: Never    Drug use: Never    Sexual activity: Not on file   Lifestyle    Physical activity:     Days per week: Not on file     Minutes per session: Not on file    Stress: Not on file   Relationships    Social connections:     Talks on phone: Not on file     Gets together: Not on file     Attends Uats[employer] service: Not on file     Active member of club or organization: Not on file     Attends meetings of clubs or organizations: Not on file     Relationship status: Not on file    Intimate partner violence:     Fear of current or ex partner: Not on file     Emotionally abused: Not on file     Physically abused: Not on file     Forced sexual activity: Not on file   Other Topics Concern    Not on file   Social History Narrative    Not on file     Social History     Tobacco Use   Smoking Status Never Smoker   Smokeless Tobacco Never Used        Temp (24hrs), Av.1 °F (36.7 °C), Min:98 °F (36.7 °C), Max:98.2 °F (36.8 °C)    Visit Vitals  /56 (BP Patient Position: At rest)   Pulse 79   Temp 98.2 °F (36.8 °C)   Resp 20   SpO2 97%       ROS: 12 point ROS obtained in details. Pertinent positives as mentioned in HPI,   otherwise negative    Physical Exam:    General: Well developed, well nourished female laying on the bed  AAOx3 in no acute distress. General:   awake alert and oriented   HEENT:  Normocephalic, atraumatic, PERRL, EOMI, no scleral icterus or pallor; no conjunctival hemmohage;  nasal and oral mucous are moist and without evidence of lesions. No thrush. Neck supple, no bruits. Lymph Nodes:   no cervical, axillary or inguinal adenopathy   Lungs:   non-labored, bilaterally clear to auscultation- no crackles wheezes rales or rhonchi   Heart:  RRR, s1 and s2; no rubs or gallops, no edema, + pedal pulses   Abdomen:  soft, non-distended, active bowel sounds, no hepatomegaly, no splenomegaly. Minimal suprapubic tenderness   Genitourinary:  no chua   Extremities:   no clubbing, cyanosis; no joint effusions or swelling; Full ROM of all large joints to the upper and lower extremities; muscle mass appropriate for age   Neurologic:  No gross focal sensory abnormalities; 5/5 muscle strength to upper and lower extremities. Speech appropriate.  Cranial nerves intact                        Skin:  No rash or ulcers noted   Back:  no spinal or paraspinal muscle tenderness or rigidity, minimal right CVA tenderness around the right PCNL tube, clear urine in PCNL     Psychiatric:  No suicidal or homicidal ideations, appropriate mood and affect         Labs: Results:   Chemistry Recent Labs     20  0507 20  2104   * 120*    143   K 4.8 4.8   * 111   CO2 26 26   BUN 43* 42*   CREA 4.06* 4.27*   CA 8.0* 8.7   AGAP 5 6   BUCR 11* 10*    129*   TP 6.6 8.2   ALB 2.7* 3.5   GLOB 3.9 4.7*   AGRAT 0.7* 0.7*      CBC w/Diff Recent Labs     02/21/20  0507 02/20/20  2104   WBC 6.0 8.8   RBC 2.79* 3.27*   HGB 8.5* 10.0*   HCT 27.4* 32.1*    223   GRANS 34* 49   LYMPH 53* 39   EOS 3 3      Microbiology No results for input(s): CULT in the last 72 hours.        RADIOLOGY:    All available imaging studies/reports in [facility] for this admission were reviewed    Dr. Suzy Valerio, Infectious Disease Specialist  [phone]  February 21, 2020  9:14 AM

## 2020-02-21 NOTE — PROGRESS NOTES
NUTRITION    Nursing Referral: [facility]     RECOMMENDATIONS / PLAN:     - Recommend resuming renal diet when medically appropriate after procedure. - Discontinue Nepro TID.   - Continue RD inpatient monitoring and evaluation. NUTRITION INTERVENTIONS & DIAGNOSIS:     - Meals/snacks: NPO, resume  - Medical food supplement therapy: discontinue    Nutrition Diagnosis: Inadequate oral intake related to current NPO status as evidenced by pt NPO for procedure. ASSESSMENT:     Pt NPO for nephrostomy tube replacement today. Reports somewhat unintentional wt loss, but pt wanted to lose the wt. States her eating habits have improved from frozen meals since moving down here with son in December. Discussed that supplementation is not indicated if pt is eating enough, pt in agreement and declines even ordering once daily pending appetite/meal intake once diet is resumed. Discussed meal options and food preferences. Nutritional intake adequate to meet patients estimated nutritional needs:  No    Diet: DIET NUTRITIONAL SUPPLEMENTS All Meals; NEPRO  DIET NPO Except Meds      Food Allergies: NKFA  Current Appetite: Good  Appetite/meal intake prior to admission: Good  Feeding Limitations:  [] Swallowing difficulty    [] Chewing difficulty    [] Other:  Current Meal Intake: No data found.     BM: unknown  Skin Integrity: WDL  Edema:   [x] No     [] Yes   Pertinent Medications: Reviewed: NS at 100 mL/hr, ascorbic acid, cholecalciferol     Recent Labs     02/21/20  0507 02/20/20  2104    143   K 4.8 4.8   * 111   CO2 26 26   * 120*   BUN 43* 42*   CREA 4.06* 4.27*   CA 8.0* 8.7   ALB 2.7* 3.5   SGOT 16 20   ALT 10* 13       Intake/Output Summary (Last 24 hours) at 2/21/2020 1143  Last data filed at 2/21/2020 0107  Gross per 24 hour   Intake 1570 ml   Output 300 ml   Net 1270 ml       Anthropometrics:  Ht Readings from Last 1 Encounters:   02/21/20 5' 2\" (1.575 m)     Last 3 Recorded Weights in this Encounter    02/21/20 1142   Weight: 109.3 kg (241 lb)     Body mass index is 44.08 kg/m². Obese class III    Weight History: Reports weight loss from 280# since December (-39#, 14%)    Weight Metrics 2/21/2020 2/20/2020 1/10/2020   Weight 241 lb - 250 lb   BMI - 44.08 kg/m2 45.73 kg/m2        Admitting Diagnosis: Pyelonephritis [N97]  Complicated urinary tract infection [N39.0]  Pertinent PMHx: CKd stage 4, HTN, DM, GERD, OA, nephrostomy tubes placed 6/20/19    Education Needs:        [x] None identified  [] Identified - Not appropriate at this time  []  Identified and addressed - refer to education log  Learning Limitations:   [x] None identified  [] Identified    Cultural, Orthodox & ethnic food preferences:  [x] None identified    [] Identified and addressed     ESTIMATED NUTRITION NEEDS:     Calories: 6117-8893 kcal (MSJx1-1.2) based on  [x] Actual  kg     [] IBW   Protein:  gm (0.8-1 gm/kg) based on  [x] Actual BW      [] IBW   Fluid: 1 mL/kcal     MONITORING & EVALUATION:     Nutrition Goal(s):   - PO nutrition intake will meet >75% of patient estimated nutritional needs within the next 7 days. Outcome: New/Initial goal     Monitoring:   [x] Food and nutrient intake   [x] Food and nutrient administration  [x] Comparative standards   [x] Nutrition-focused physical findings   [x] Anthropometric Measurements   [x] Treatment/therapy   [x] Biochemical data, medical tests, and procedures        Previous Recommendations (for follow-up assessments only):  Not Applicable     Discharge Planning: No nutritional discharge needs at this time. Participated in care planning, discharge planning, & interdisciplinary rounds as appropriate.       Sola Medrano RD, [address] [place]   Pager: [phone]

## 2020-02-21 NOTE — ROUTINE PROCESS
End of Shift Note     Bedside and verbal shift change report given to Carson Tucker RN (On coming nurse) by Rome Carroll RN (Off going nurse).   Report included the following information:      --Procedure Summary     --MAR,     --Recent Results     --Med Rec Status    SBAR Recommendations: Home ABX    Issues for Provider to address          Activity This Shift     [] Bed Rest Order   [] Refused   [] Dangled    [] TDWB         Ambulating:     [] Bathroom     [] BSC     [] Room/Hallway      Up in Chair for meals    []Yes [] No   Voiding       [x] Yes  [] No  Cline          [] Yes  [] No  Incontinent [] Yes  [] No    DUE TO VOID POUR        [] Yes [] No  Purewick    [] Yes [] No  New Onset [] Yes [] No Straight Cath   []Yes  [] No  Condom Cath  [] Yes [] No  MD Called      [] Yes  [] No   Blood Sugars Managed []Yes [x] No    Bowels Moved [x] Yes [] No    Incontinent     [] Yes [] No Passed Gas [x]Yes [] No    New Onset  []Yes [] No        MD Called []Yes  [] No     CHG Bath Done     Before Surgery     After Surgery      [] Yes  [x] No  [] Yes  [x] No       Drain Removed [] Yes  [] No [x] N/A    Dressing Changed [] Yes   [] No [x] N/A      Nausea/Vomiting [] Yes   [x] No     Ice Packs Changed [] Yes   [] No  [x] N/A    Incentive Spirometer  [] Yes  [x] No      SCD Pumps On     Ankle Pumping  [x] Yes   [] No      [] Yes   [x] No        Telemetry Monitoring [] Yes   [x] No   Rhythm

## 2020-02-21 NOTE — PROGRESS NOTES
Intern Progress Note  [facility][1] Family Medicine       Patient: Nayely Helton MRN: [id number]  CSN: [id number]    YOB: 1945  Age: 66 y.o. Sex: female    DOA: 2/20/2020 LOS:  LOS: 1 day                    Subjective:     Acute events: No acute events, Pt reports some improvement in symptoms. She talks about how she is frustrated with her incontinence and that she will sit and try to get something out and have no success and then when she gets all cleaned up. She does report some minor pain near her nephrostomy site. Stools yesterday loose but not watery    Review of Systems   Constitutional: Negative for chills and fever. Respiratory: Positive for shortness of breath. Cardiovascular: Negative for chest pain and palpitations. Gastrointestinal: Negative for nausea and vomiting. Genitourinary: Positive for hematuria and urgency. Neurological: Negative for dizziness and headaches. Objective:      Patient Vitals for the past 24 hrs:   Temp Pulse Resp BP SpO2   02/21/20 0504 98.2 °F (36.8 °C) 79 20 110/56 97 %   02/20/20 2042 98.2 °F (36.8 °C) 77 20 130/70 97 %   02/20/20 1928 -- -- -- -- 97 %   02/20/20 1840 -- 76 16 111/62 98 %   02/20/20 1701 98 °F (36.7 °C) 92 16 -- 97 %         Intake/Output Summary (Last 24 hours) at 2/21/2020 6808  Last data filed at 2/21/2020 0107  Gross per 24 hour   Intake 1570 ml   Output 300 ml   Net 1270 ml       Physical Exam:   General:  AAOx3,  In no distress   HEENT: Oral Mucosa dry, poor dentition  Heart: RRR, no m/r/g  Lungs: CTAB, no r/r/w  Abdomen: Soft, obese, non-tender today, Neprhrostomy tube in place, covered in bandage, no eryhthema noted around the tube  Neuro Exam: No focal neuro deficits seen moving all extremities   Skin:  No rashes, lesions, or ulcers. Good turgor.   Extremities: 1+ pitting edema in b/l lower extremities, LUE has AV fistula        Lab/Data Reviewed:    Recent Results (from the past 12 hour(s))   URINALYSIS W/ RFLX MICROSCOPIC    Collection Time: 02/20/20 11:20 PM   Result Value Ref Range    Color YELLOW      Appearance TURBID      Specific gravity 1.013 1.005 - 1.030      pH (UA) 6.5 5.0 - 8.0      Protein 100 (A) NEG mg/dL    Glucose NEGATIVE  NEG mg/dL    Ketone NEGATIVE  NEG mg/dL    Bilirubin NEGATIVE  NEG      Blood MODERATE (A) NEG      Urobilinogen 1.0 0.2 - 1.0 EU/dL    Nitrites NEGATIVE  NEG      Leukocyte Esterase LARGE (A) NEG     URINE MICROSCOPIC ONLY    Collection Time: 02/20/20 11:20 PM   Result Value Ref Range    WBC TOO NUMEROUS TO COUNT 0 - 5 /hpf    RBC  0 - 5 /hpf     UNABLE TO QUANTITATE MICROSCOPIC PARAMETERS DUE TO EXCESSIVE WBCS    Epithelial cells  0 - 5 /lpf     UNABLE TO QUANTITATE MICROSCOPIC PARAMETERS DUE TO EXCESSIVE WBCS    Bacteria (A) NEG /hpf     UNABLE TO QUANTITATE MICROSCOPIC PARAMETERS DUE TO EXCESSIVE WBCS   METABOLIC PANEL, COMPREHENSIVE    Collection Time: 02/21/20  5:07 AM   Result Value Ref Range    Sodium 143 136 - 145 mmol/L    Potassium 4.8 3.5 - 5.5 mmol/L    Chloride 112 (H) 100 - 111 mmol/L    CO2 26 21 - 32 mmol/L    Anion gap 5 3.0 - 18 mmol/L    Glucose 103 (H) 74 - 99 mg/dL    BUN 43 (H) 7.0 - 18 MG/DL    Creatinine 4.06 (H) 0.6 - 1.3 MG/DL    BUN/Creatinine ratio 11 (L) 12 - 20      GFR est AA 13 (L) >60 ml/min/1.73m2    GFR est non-AA 11 (L) >60 ml/min/1.73m2    Calcium 8.0 (L) 8.5 - 10.1 MG/DL    Bilirubin, total 0.4 0.2 - 1.0 MG/DL    ALT (SGPT) 10 (L) 13 - 56 U/L    AST (SGOT) 16 10 - 38 U/L    Alk.  phosphatase 114 45 - 117 U/L    Protein, total 6.6 6.4 - 8.2 g/dL    Albumin 2.7 (L) 3.4 - 5.0 g/dL    Globulin 3.9 2.0 - 4.0 g/dL    A-G Ratio 0.7 (L) 0.8 - 1.7     CBC WITH AUTOMATED DIFF    Collection Time: 02/21/20  5:07 AM   Result Value Ref Range    WBC 6.0 4.6 - 13.2 K/uL    RBC 2.79 (L) 4.20 - 5.30 M/uL    HGB 8.5 (L) 12.0 - 16.0 g/dL    HCT 27.4 (L) 35.0 - 45.0 %    MCV 98.2 (H) 74.0 - 97.0 FL    MCH 30.5 24.0 - 34.0 PG    MCHC 31.0 31.0 - 37.0 g/dL    RDW 16.3 (H) 11.6 - 14.5 %    PLATELET 846 340 - 047 K/uL    MPV 11.0 9.2 - 11.8 FL    NEUTROPHILS 34 (L) 40 - 73 %    LYMPHOCYTES 53 (H) 21 - 52 %    MONOCYTES 9 3 - 10 %    EOSINOPHILS 3 0 - 5 %    BASOPHILS 1 0 - 2 %    ABS. NEUTROPHILS 2.1 1.8 - 8.0 K/UL    ABS. LYMPHOCYTES 3.2 0.9 - 3.6 K/UL    ABS. MONOCYTES 0.6 0.05 - 1.2 K/UL    ABS. EOSINOPHILS 0.2 0.0 - 0.4 K/UL    ABS.  BASOPHILS 0.0 0.0 - 0.1 K/UL    DF AUTOMATED           Scheduled Medications Reviewed:  Current Facility-Administered Medications   Medication Dose Route Frequency    cyclobenzaprine (FLEXERIL) tablet 5 mg  5 mg Oral DAILY    lactobacillus sp. 50 billion cpu (BIO-K PLUS) capsule 1 Cap  1 Cap Oral DAILY    cefTRIAXone (ROCEPHIN) 1 g in sterile water (preservative free) 10 mL IV syringe  1 g IntraVENous Q24H    acetaminophen (TYLENOL) tablet 650 mg  650 mg Oral BID    [Held by provider] allopurinoL (ZYLOPRIM) tablet 200 mg  200 mg Oral DAILY    amLODIPine (NORVASC) tablet 5 mg  5 mg Oral DAILY    ascorbic acid (vitamin C) (VITAMIN C) tablet 500 mg  500 mg Oral DAILY    calcitRIOL (ROCALTROL) capsule 0.25 mcg  0.25 mcg Oral EVERY OTHER DAY    carvediloL (COREG) tablet 12.5 mg  12.5 mg Oral BID WITH MEALS    cholecalciferol (VITAMIN D3) (1000 Units /25 mcg) tablet 4 Tab  4,000 Units Oral DAILY    [Held by provider] gabapentin (NEURONTIN) capsule 100 mg  100 mg Oral TID    latanoprost (XALATAN) 0.005 % ophthalmic solution 1 Drop  1 Drop Both Eyes QHS    levothyroxine (SYNTHROID) tablet 125 mcg  125 mcg Oral ACB    B complex-vitaminC-folic acid (NEPHROCAP) cap  1 Cap Oral DAILY    fluticasone propionate (FLONASE) 50 mcg/actuation nasal spray 2 Spray  2 Spray Both Nostrils DAILY    heparin (porcine) injection 5,000 Units  5,000 Units SubCUTAneous Q8H    0.9% sodium chloride infusion  100 mL/hr IntraVENous CONTINUOUS    pantoprazole (PROTONIX) tablet 40 mg  40 mg Oral ACB    sodium bicarbonate tablet 1,300 mg  1,300 mg Oral TID     Xr Chest Pa Lat    Result Date: 2/21/2020  IMPRESSION: Question right lung pulmonary nodular densities. CT may be obtained for clarification. Additional findings as discussed. Ct Abd Pelv Wo Cont    Result Date: 2/20/2020  IMPRESSION: Right kidney shows no dilation of the collecting system with nephrostomy in place. Bilateral renal cysts. Alcohol the renal hypodensities characterized on this noncontrast study; small solid nodule not excluded Severe edema of the bladder and perivesical region. There is diverticulosis without diverticulitis . All CT scans at this facility are performed using dose optimization technique as appropriate to the performed exam, to include automated exposure control, adjustment of the mA and/or kV according to patient's size (Including appropriate matching for site-specific examinations), or use of iterative reconstruction technique. Assessment/Plan     66 y.o. female with PMH CKD Stage 5 on procrit, hx of recurrent MDR UTIs/stones s/p R nephrostomy tube (06/20), HTN, DM II w/ neuropathy, anemia, galucoma, GERD, CTS, OA in back and knees now admitted with complicated UTI     Complicated UTI/Hx of recurrent UTI/nephrolithiasis w/MDR s/p R Nephrostomy tube placement 2019/CKD Stage 5  2 month hx of worsening suprapubic discomfort with dysuria, incontinence and straining. Per Dr. Dove Sessions o/p note on 2/10/20: patient was seeing Urology in [place], [place] and has a hx of recurrent kidney stones (uric acid)/sepsis 2/2 UTI and underwent stent placement for management of her kidney stones and eventually developed strictures and severe hydronephrosis for which she had to have R nephrostomy tube put in 2019. Also has had a hx of recurrent UTIs that required extensive Abx course/hospitizations in the past, and has documented hx of developing C. Diff infection. She also has L arm brachiocephalic fistula that is ready to use per her Vascular doctor.  Per Dr. Dove Sessions note - patient on lasix 40mg PRN, Coreg 12.5mg qday, Norvasc 5mg qday, NaHCO3 for chronic metabolic acidosis and Calcitrol for secondary hyperPTH  Of note, patient underwent IR guided R Nephrostomy tube exchange on 1/28/20 per Dr. Bunny Lundy notes  Nephrology consulted from [facility] [place] office and will be following the patient in the AM.   Poor urine output, pt reports chronic retention with incontinence  - Consult urology today for urinary retention and hematuria  - bladder checks straight cath prn >200 mL  - UA with gross pyuria  - Admit to medical floor  - Abx coverage with Rocephin 1g every 24 hours per Dr. Tito Cooper/Dr. Mcdonald Hodgkin  - Nephrology on board; f/u recs   - F/U UCx  - Can consider Urology consult in the AM (although CT pelvis did not show any obstruction or hydronephrosis)  - Continue Probiotics per ID  - VS per unit routine  - Daily daily CBC/BMP  - PT/OT/CM     JADON on CKD Stage 5 likely 2/2 Dehydration   Cr 4.27 upon admission; baseline 3.13 per [facility] Nephrology notes and lab review. Patient reported ongoing nausea for which she was taking Zofran and meclizine in the past couple months and had poor po intake. Oral mucosa dry, did not appear volume overloaded on p/e. - Start patient on manitainence fluids - NS 100cc/hr  - Daily BMPs, avoid nephrotoxic drugs     Upper Respiratory Infection likely 2/2 viral etiology  3 d hx of rhinorrhea, nasal congestion but no fever, cough or myalgias  - Flonase to help with congestion  - CXR showing \"Question right lung pulmonary nodular densities\"     Anemia of Chronic Disease likely 2/2 to her CKD Stage 5  HgB 10.0 on admission, patient on Procrit but unclear on dose/frequency. Will defer starting patient back on Procrit to Nephrology.  Hgb dropped to 8.5 this AM, no CP, stable SOB, hematurea  - Monitor daily CBC      Chronic Metabolic Acidosis likely 2/2 to her CKD Stage 5  - Continue on home NaHCO2 650mg two tablets TID     Chronic HTN  BP on admission 111/62  - Continue home Amlodipine 5mg and Coreg 12.5mg BID   - Monitor via VS per unit routine     Hx of DM II w/ neuropathy  Last HbA1C 4.9; takes Gabapentin at home 100mg TID   - Continue to monitor BG thorough daily BMPs   - Hold off on Gabapentin given her worsening kidney function and JADON; can resume if okay with Nephrology     GERD - chronic hx  On Omeprazole at home  - Protonix 40mg qday while admitted     Hypothyroidism - chronic   - Continue home Synthroid 125 mcg po qhs     Diet Renal Diet    DVT Prophylaxis SQH   GI Prophylaxis Protonix   Code status DNR/DNI   Disposition Medical Floor       Point of Contact Julio Stern   Relationship:  [id number] Jourdan Angel MD   [id number] [facility] Medicine Intern  02/21/20 9:18 AM

## 2020-02-21 NOTE — H&P
Intern Admission History and Physical  [facility]      Patient: Soraya Lewis MRN: [id number]  [facility]: [id number]    YOB: 1945  Age: 66 y.o. Sex: female      Admission Date: 2/20/2020       HPI:     Soraya Lewis is a 66 y.o. 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 She was seen earlier in at [facility] office for establishment of care and was sent to the ED. The nephrology who was consulted from the outpatient office wanted her admitted for failed o/p Pyelo treatment and possible HD. Complains of worsening 2 month hx of worsening suprapubic pressure like pain, burning accompanied with straining and bladder spasms. Noted pinkish tinge to the urine this AM. Has similar symptoms for ~20 times in total. Denies any fever but complains of 1x vomiting today, 2 episodes of loose stool. Reports feeling SOB that has been ongoing for years and has been told by her Nephrologist in [place] that it is due to her \"uremia\". Was seen in the ER recently and was given a course of bactrim that she took only 4 tablets of and started experiencing loose stool so she stopped taking them. Has a similar hx of diarrhea associated with her extensive Abx use in the past and her testing positive for C diff. Has also had nasal congestion, rhinorrhea for the last 3 days. Complains of back pain that is not new or has worsened. Reports 40lb unintentional weight loss in less than a year.      Saw Dr. Jazzy Moore 2-3 weeks ago who wanted her to have a PCP  Patient recently moved from [place] to [place] in December 2019 and has been staying with his son and daughter-in-law  Has Urology appointment scheduled with Dr. Swapnil Salas on 03/22/2020    Review of Systems:  General ROS: negative for  - chills, fever, night sweats, weight gain and weight loss  Psychological ROS: negative for - anxiety and depression  Ophthalmic ROS: negative for - blurry vision, decreased vision or loss of vision  ENT ROS: negative for - headaches, hearing change or visual changes  Hematological and Lymphatic ROS: negative for - bruising, jaundice  Respiratory ROS: negative for - cough, hemoptysis, shortness of breath, orthopnea, paroxysmal dyspnea, or wheezing  Cardiovascular ROS: negative for - chest pain, edema, loss of consciousness, or palpitations   Gastrointestinal ROS: negative for - abdominal pain, blood in stools, change in stools, constipation, diarrhea, hematemesis, melena or swallowing difficulty/pain  Genito-Urinary ROS:please see HPI   Musculoskeletal ROS: negative for - joint pain, joint swelling or muscle pain  Neurological ROS: negative for - dizziness, headaches, numbness/tingling or weakness  Dermatological ROS: negative for - rash or skin lesion changes      Social History     Socioeconomic History    Marital status: SINGLE     Spouse name: Not on file    Number of children: Not on file    Years of education: Not on file    Highest education level: Not on file   Tobacco Use    Smoking status: Never Smoker    Smokeless tobacco: Never Used   Substance and Sexual Activity    Alcohol use: Never     Frequency: Never    Drug use: Never       Allergies   Allergen Reactions    Ciprofloxacin Hives    Statins-Hmg-Coa Reductase Inhibitors Other (comments)     Body ache       Prior to Admission Medications   Prescriptions Last Dose Informant Patient Reported? Taking? B.infantis-B.ani-B.long-B.bifi (PROBIOTIC 4X) 10-15 mg TbEC   Yes No   Sig: Take  by mouth. acetaminophen (TYLENOL) 325 mg tablet   Yes No   Sig: Take 650 mg by mouth two (2) times a day. allopurinoL (ZYLOPRIM) 100 mg tablet   Yes No   Sig: Take 200 mg by mouth daily. amLODIPine (NORVASC) 5 mg tablet   Yes No   Sig: Take 5 mg by mouth daily.    ascorbic acid, vitamin C, (VITAMIN C) 500 mg tablet   Yes No   Sig: Take 500 mg by mouth.   calcitRIOL (ROCALTROL) 0.25 mcg capsule   Yes No   Sig: Take 0.25 mcg by mouth every other day. carvediloL (COREG) 12.5 mg tablet   Yes No   Sig: Take  by mouth two (2) times daily (with meals). cholecalciferol (VITAMIN D3) (2,000 UNITS /50 MCG) cap capsule   Yes No   Sig: Take 2,000 Units by mouth two (2) times a day. Take two tabs a total of 4000 units   cyclobenzaprine (FLEXERIL) 5 mg tablet   Yes No   Sig: Take 5 mg by mouth daily. gabapentin (NEURONTIN) 100 mg capsule   Yes No   Sig: Take  by mouth three (3) times daily. latanoprost (XALATAN) 0.005 % ophthalmic solution   Yes No   Sig: Administer 1 Drop to both eyes nightly. One drop at bedtime   levothyroxine (SYNTHROID) 125 mcg tablet   Yes No   Sig: Take 125 mcg by mouth Daily (before breakfast). meclizine (ANTIVERT) 25 mg tablet   Yes No   Sig: Take  by mouth three (3) times daily as needed for Dizziness. omeprazole (PRILOSEC) 20 mg capsule   Yes No   Sig: Take 20 mg by mouth daily. ondansetron (ZOFRAN ODT) 4 mg disintegrating tablet   Yes No   Sig: Take 4 mg by mouth every eight (8) hours as needed for Nausea or Vomiting. traMADol (ULTRAM) 50 mg tablet   Yes No   Sig: Take 50 mg by mouth two (2) times daily as needed for Pain.   vit B Cmplx 3-FA-Vit C-Biotin (NEPHRO HOWIE RX) 1- mg-mg-mcg tablet   Yes No   Sig: Take 1 Tab by mouth daily.       Facility-Administered Medications: None       Physical Exam:     Patient Vitals for the past 24 hrs:   Temp Pulse Resp BP SpO2   02/20/20 1840 -- 76 16 111/62 98 %   02/20/20 1701 98 °F (36.7 °C) 92 16 -- 97 %       Physical Exam:   General:  AAOx3,  In mild distress 2/2 her abdominal discomfort   HEENT: Oral Mucosa dry, poor dentition  Heart: RRR, no m/r/g  Lungs: CTAB, no r/r/w  Abdomen: Soft, obese, tender to palpation in suprapubic region, no rebound tenderness, no CVA tenderness  Neprhrostomy tube in place, covered in bandage, no eryhthema/drainage noted around the tube, dark yellow urine noted in the nephrostomy bag  Neuro Exam: CN II- XII grossly intact   Skin:  No rashes, lesions, or ulcers. Good turgor. Extremities: 1+ pitting edema in b/l lower extremities, LUE has AV fistula     CBC  wnl    CMP  Electrolytes wnl  BUN 42  Cr 4.27  GFR 10    UA   Results for Jesús Ding (MRN [id number]) as of 2/20/2020 21:45   Ref. Range 1/16/2020 16:38   Color Latest Units:   YELLOW   Appearance Latest Units:   CLOUDY   Specific gravity Latest Ref Range: 1.005 - 1.030   1.015   pH (UA) Latest Ref Range: 5.0 - 8.0   6.0   Protein Latest Ref Range: NEG mg/dL 100 (A)   Glucose Latest Ref Range: NEG mg/dL NEGATIVE   Ketone Latest Ref Range: NEG mg/dL NEGATIVE   Blood Latest Ref Range: NEG   SMALL (A)   Bilirubin Latest Ref Range: NEG   NEGATIVE   Urobilinogen Latest Ref Range: 0.2 - 1.0 EU/dL 1.0   Nitrites Latest Ref Range: NEG   POSITIVE (A)   Leukocyte Esterase Latest Ref Range: NEG   LARGE (A)   Epithelial cells Latest Ref Range: 0 - 5 /lpf FEW   WBC Latest Ref Range: 0 - 4 /hpf 25 to 30   RBC Latest Ref Range: 0 - 5 /hpf 1 to 3   Bacteria Latest Ref Range: NEG /hpf 4+ (A)     CT abd/pelvis 2/20/20  No hydronephrosis   Right kidney shows no dilation of the collecting system with nephrostomy in  place. Bilateral renal cysts. Alcohol the renal hypodensities characterized on this  noncontrast study; small solid nodule not excluded  Severe edema of the bladder and perivesical region.   There is diverticulosis without diverticulitis    Liver Enzymes Protein, total   Date Value Ref Range Status   01/16/2020 7.7 6.4 - 8.2 g/dL Final     Albumin   Date Value Ref Range Status   01/16/2020 3.0 (L) 3.4 - 5.0 g/dL Final     Globulin   Date Value Ref Range Status   01/16/2020 4.7 (H) 2.0 - 4.0 g/dL Final     A-G Ratio   Date Value Ref Range Status   01/16/2020 0.6 (L) 0.8 - 1.7   Final     AST (SGOT)   Date Value Ref Range Status   01/16/2020 27 10 - 38 U/L Final Alk. phosphatase   Date Value Ref Range Status   01/16/2020 240 (H) 45 - 117 U/L Final     No results for input(s): TP, ALB, GLOB, AGRAT, SGOT, GPT, AP, TBIL in the last 72 hours. No lab exists for component: DBIL       Urinalysis Lab Results   Component Value Date/Time    Color YELLOW 01/16/2020 04:38 PM    Appearance CLOUDY 01/16/2020 04:38 PM    Specific gravity 1.015 01/16/2020 04:38 PM    pH (UA) 6.0 01/16/2020 04:38 PM    Protein 100 (A) 01/16/2020 04:38 PM    Glucose NEGATIVE  01/16/2020 04:38 PM    Ketone NEGATIVE  01/16/2020 04:38 PM    Bilirubin NEGATIVE  01/16/2020 04:38 PM    Urobilinogen 1.0 01/16/2020 04:38 PM    Nitrites POSITIVE (A) 01/16/2020 04:38 PM    Leukocyte Esterase LARGE (A) 01/16/2020 04:38 PM    Epithelial cells FEW 01/16/2020 04:38 PM    Bacteria 4+ (A) 01/16/2020 04:38 PM    WBC 25 to 30 01/16/2020 04:38 PM    RBC 1 to 3 01/16/2020 04:38 PM        Imaging:  XR (Most Recent). Results from [place] encounter on 01/16/20   XR CHEST PA LAT    Narrative EXAM: Chest Radiographs    INDICATION:  sob    TECHNIQUE: PA and lateral views of the chest    COMPARISON: None. FINDINGS: No pneumothorax identified. The lungs are clear. No infiltrates  identified. No effusions appreciated. The cardiomediastinal silhouette is  unremarkable. The pulmonary vascularity is unremarkable. The osseous structures  are unremarkable. Impression IMPRESSION:  1. No acute cardiopulmonary process. CT (Most Recent) Results from Hospital Encounter encounter on 02/20/20   CT ABD PELV WO CONT    Narrative CT ABDOMEN AND PELVIS WITHOUT CONTRAST    COMPARISON: None. INDICATIONS: Hematuria, chronic right nephrostomy. ,.    TECHNIQUE: Volumetric data acquisition was performed of the abdomen and pelvis  on a multislice scanner and reconstructed in axial coronal and sagittal planes    CT ABDOMEN FINDINGS:     Lung Bases: Clear. Liver/Gallbladder/Biliary: Gallbladder surgically absent.  The liver and biliary  tree unremarkable. Spleen: Normal.    Adrenal Glands: Normal.    Kidneys: Right kidney is lobulated with a nephrostomy tube in place. Hypodensity  suggestive of cysts are present. Not all can be characterized. he collecting  system is not dilated. The left kidney is also lobulated. Exophytic hypodensity  is consistent with a cyst. No hydronephrosis. . No calculi evident    Pancreas: Normal.    Stomach, Small Bowel, and Colon: Gastric bypass. Small bowel unremarkable. The  appendix is not well demonstrated. The colon contains a rare diverticuli but is  otherwise nremarkable. Lymph Nodes: Normal in size and number. The abdominal aorta is unremarkable. The IVC is unremarkable. Peritoneal Spaces: No free fluid or free air is present. Abdominal wall: No hernia or mass is evident    Bladder: The bladder is markedly edematous with haziness of the perivesical fat. Osseous Structures Of Abdomen And Pelvis: Unremarkable for age. Impression IMPRESSION:     Right kidney shows no dilation of the collecting system with nephrostomy in  place. Bilateral renal cysts. Alcohol the renal hypodensities characterized on this  noncontrast study; small solid nodule not excluded  Severe edema of the bladder and perivesical region. There is diverticulosis without diverticulitis            . All CT scans at this facility are performed using dose optimization technique as  appropriate to the performed exam, to include automated exposure control,  adjustment of the mA and/or kV according to patient's size (Including  appropriate matching for site-specific examinations), or use of iterative  reconstruction technique.             Assessment/Plan:   66 y.o. female with PMH CKD Stage 5 on procrit, hx of recurrent MDR UTIs/stones s/p R nephrostomy tube (06/20), HTN, DM II w/ neuropathy, anemia, galucoma, GERD, CTS, OA in back and knees now admitted with complicated UTI    Complicated UTI/Hx of recurrent UTI/nephrolithiasis w/MDR s/p R Nephrostomy tube placement 2019/CKD Stage 5  2 month hx of worsening suprapubic discomfort with dysuria, incontinence and straining. Per Dr. Jesús Ballard o/p note on 2/10/20: patient was seeing Urology in [place], [place] and has a hx of recurrent kidney stones (uric acid)/sepsis 2/2 UTI and underwent stent placement for management of her kidney stones and eventually developed strictures and severe hydronephrosis for which she had to have R nephrostomy tube put in 2019. Also has had a hx of recurrent UTIs that required extensive Abx course/hospitizations in the past, and has documented hx of developing C. Diff infection. She also has L arm brachiocephalic fistula that is ready to use per her Vascular doctor. Per Dr. Jesús Ballard note - patient on lasix 40mg PRN, Coreg 12.5mg qday, Norvasc 5mg qday, NaHCO3 for chronic metabolic acidosis and Calcitrol for secondary hyperPTH  Of note, patient underwent IR guided R Nephrostomy tube exchange on 1/28/20 per Dr. Wild Painting notes  Nephrology consulted from [facility] office and will be following the patient in the AM.  - Admit to medical floor  - Abx coverage with Rocephin 1g every 24 hours per Dr. Katherine Cooper/Dr. Santiago Monterroso  - Nephrology on board; f/u recs  - Monitor urine o/p; can consider serial bladder scans if patient unable to void   - F/U UA, UCx  - Can consider Urology consult in the AM (although CT pelvis did not show any obstruction or hydronephrosis)  - Continue Probiotics per ID  - VS per unit routine  - Daily daily CBC/BMP  - PT/OT/CM    JADON on CKD Stage 5 likely 2/2 Dehydration   Cr 4.27 upon admission; baseline 3.13 per [facility] notes and lab review. Patient reported ongoing nausea for which she was taking Zofran and meclizine in the past couple months and had poor po intake. Oral mucosa dry, did not appear volume overloaded on p/e.   - Start patient on manitainence fluids - NS 100cc/hr  - Daily BMPs, avoid nephrotoxic drugs    Upper Respiratory Infection likely 2/2 viral etiology  3 d hx of rhinorrhea, nasal congestion but no fever, cough or myalgias  - Flonase to help with congestion  - Can consider CXR if symptoms persist     Anemia of Chronic Disease likely 2/2 to her CKD Stage 5  HgB 10.0 on admission, patient on Procrit but unclear on dose/frequency. Will defer starting patient back on Procrit to Nephrology  - Monitor daily CBC     Chronic Metabolic Acidosis likely 2/2 to her CKD Stage 5  - Continue on home NaHCO2 650mg two tablets TID    Chronic HTN  BP on admission 111/62  - Continue home Amlodipine 5mg and Coreg 12.5mg BID   - Monitor via VS per unit routine    Hx of DM II w/ neuropathy  Last HbA1C 4.9; takes Gabapentin at home 100mg TID   - Continue to monitor BG thorough daily BMPs   - Hold off on Gabapentin given her worsening kidney function and JADON; can resume if okay with Nephrology    GERD - chronic hx  On Omeprazole at home  - Protonix 40mg qday while admitted    Hypothyroidism - chronic   - Continue home Synthroid 125 mcg po qhs    Diet Renal Diet    DVT Prophylaxis SQH   GI Prophylaxis Protonix   Code status DNR/DNI   Disposition Medical Floor      Point of Contact Julio Stern   Relationship:  [phone]        Demetrius Dodge MD , PGY-1   [facility]   Intern Pager: [phone]   February 20, 2020, 7:01 PM     ===================================================    Jose Ferro MD   Resident   FAMILY PRACTICE   H&P   Shared   Date of Service:  02/20/20 1913                    []Hide copied text    []Marcos for details       Senior Resident History and Physical  [facility] Medicine     HPI:      Lucius Felipe is a 66 y.o. female with PMH of Recurrent UTIs w/multidrug resistance, CKD, stage V on Procrit, Anemia, HTN, T2DM, Chronic Back Pain, GERD, Glaucoma, now with complaint of UTI symptoms.      Patient was sent over by PCP with concerns of new UTI.  PCP spoke with patient's nephrologist, Dr. Jered Ness, who recommended being admitted for IV antibiotics given patient's history of recurrent UTIs and subsequent C. Difficile infections that were also recurrent. In the past, she has required considerable amount of IV antibiotics and hospitalizations for complicated UTIs. She was seen in the ED on 1/16 and prescribed Bactrim. She only took 2 days worth and stopped due to history of C. Diff.     Today, patient states that she has had 2- month history of progressively worsening bladder spasms associated with urgency, hesitancy, frequency, dysuria, worsening incontinence, and weight loss. Patient also reports new nausea, vomiting, hematuria, and diarrhea that started today. Patient has a nephrostomy tube in right flank. She has tenderness at site, but no purulent discharge, redness, or warmth. Her tube was last changed out in January 2020 and her next change will be in March 2020.      ED course, ROS, PMH, medications, labs and imaging per intern note above.        PMH, PSH, Family Hx, Social Hx, Home medications, and allergies as above in intern H&P.         Physical Exam:      Visit Vitals  BP (P) 130/70 (BP Patient Position: Sitting)   Pulse (P) 77   Temp (P) 98.2 °F (36.8 °C)   Resp (P) 20   SpO2 (P) 97%         Physical Exam:  Physical Exam  Constitutional:       General: She is not in acute distress. Appearance: She is not ill-appearing, toxic-appearing or diaphoretic. HENT:      Mouth/Throat:      Mouth: Mucous membranes are dry. Cardiovascular:      Rate and Rhythm: Normal rate. Heart sounds: No murmur. No friction rub. No gallop. Comments: Active fistula in left arm  Pulmonary:      Effort: Pulmonary effort is normal. No respiratory distress. Breath sounds: Normal breath sounds. No stridor. No wheezing, rhonchi or rales. Abdominal:      General: Abdomen is flat. There is no distension. Palpations: Abdomen is soft. Tenderness:  There is no abdominal tenderness. There is no right CVA tenderness or left CVA tenderness. Musculoskeletal:      Comments: Trace edema in LE bilaterally   Skin:     General: Skin is warm and dry. Capillary Refill: Capillary refill takes less than 2 seconds. Comments: Right-sided nephrostomy tube c/d/i with mild tenderness, but  no erythema, warmth   Neurological:      General: No focal deficit present. Mental Status: She is alert. Psychiatric:         Mood and Affect: Mood normal.            Labs Reviewed     Assessment/Plan:   66 y.o. female with PMH of Recurrent UTIs w/multidrug resistance, CKD, stage V on Procrit, Anemia, HTN, T2DM, Chronic Back Pain, GERD, Glaucoma, now admitted with symptoms of Complicated UTI.     Complicated UTI w/Hx of Multiple Hospitalizations and Multidrug Resistance: No signs of sepsis (afebrile, not tachycardic, no leukocytosis). Patient has right-sided nephrostomy tube in place since June 2018. Site does not look infected. UTI's likely from nephrostomy tube, which was placed due to obstruction from kidney stones. CT-abdomen shows edematous bladder, but no hydronephrosis, kidney stones, or obstruction.              - Admit to medicine              - Nephrology following              - ID following              - Urology Consult in AM              - Continue Rocephin 1g daily              - Continue Probiotics              - Check UA and UCx              - Daily CBC              - CM/OT/PT Consult     JADON on CKD, stage: Baseline Creatinine ~3.13. On admission, Creatinine elevated to 4.27. Likely due to decreased oral intake and recent vomiting and diarrhea. - Start normal Saline at 100 cc/hr              - Daily BMP to trend creatinine        Remainder of plan and management of chronic conditions per intern note above   Edwin Villavicencio.  Maxine Myers MD, PGY-2  [address] [facility]  02/20/20 10:56 PM

## 2020-02-21 NOTE — ROUTINE PROCESS
Patient returned to the floor. No distress noted S/P nephrostomy tube replacement. Drsg. D&I Rt. Flank.

## 2020-02-21 NOTE — CDMP QUERY
Pt admitted with UTI. Pt noted to have right nephrostomy tube replaced 2/20/2020. If possible, please document in the progress notes and d/c summary if you are evaluating and / or treating any of the following: 
 
? UTI due to right nephrostomy tube ? UTI not due to right nephrostomy tube 
? Other ? Clinically unable to determine The medical record reflects the following: 
   Risk Factors: JADON, CKD 5, anemia Clinical Indicators:  
\"2 month hx of worsening suprapubic discomfort with dysuria, incontinence and straining. \"  Per Dr. Abby Baptiste, progress note, 2/21/2020. \"dark yellow urine noted in the nephrostomy bag\"  Per Dr. Fallon Cabello, H&P, 2/20/1020 Urine culture pending Treatment:  
Normal saline 0.9% IV at 100 ml/hr start 2/21/2020 at 0000 Ceftriaxone 1 g IV q24h start 2/20/2020 at 1800 Thank you, [address], [facility], [phone]

## 2020-02-21 NOTE — CONSULTS
Consult Note    Patient: Paty Hampton               Sex: female          DOA: 2/20/2020         YOB: 1945      Age:  66 y.o.        LOS:  LOS: 1 day              HPI:     Paty Hampton is a 66 y.o. female who has been seen in evaluation of recurrent UTI at the request of Priyanka Valerio NP. Patient has a history of recurrent UTIs, recurrent nephrolithiasis, CKD5 (not on HD), anemia, HTN, DM2 and GERD who presented to the ED with worsening bladder spasms with urinary urgency, hesitancy, frequency, dysuria and incontinence. She also reported nausea, vomiting, hematuria and diarrhea. She had a right PCN placed at an outside facility in 2018 for management of failed ureteral stent and subsequent hydronephrosis; the stent was last exchanged at an outside facility in January. UA was positive for significant pyuria and UTI. Creatinine elevated to 4.27. She was admitted for further management with Urology Consultation. No past medical history on file. No past surgical history on file. No family history on file. Social History     Socioeconomic History    Marital status: SINGLE     Spouse name: Not on file    Number of children: Not on file    Years of education: Not on file    Highest education level: Not on file   Tobacco Use    Smoking status: Never Smoker    Smokeless tobacco: Never Used   Substance and Sexual Activity    Alcohol use: Never     Frequency: Never    Drug use: Never       Prior to Admission medications    Medication Sig Start Date End Date Taking? Authorizing Provider   cyclobenzaprine (FLEXERIL) 5 mg tablet Take 5 mg by mouth daily. Yes Samira, MD Lexus   acetaminophen (TYLENOL) 325 mg tablet Take 650 mg by mouth two (2) times a day. Lexus Hogan MD   allopurinoL (ZYLOPRIM) 100 mg tablet Take 200 mg by mouth daily. Lexus Hogan MD   amLODIPine (NORVASC) 5 mg tablet Take 5 mg by mouth daily.     Lexus Hogan MD   ascorbic acid, vitamin C, (VITAMIN C) 500 mg tablet Take 500 mg by mouth. Lexus Hogan MD   calcitRIOL (ROCALTROL) 0.25 mcg capsule Take 0.25 mcg by mouth every other day. Lexus Hogan MD   carvediloL (COREG) 12.5 mg tablet Take  by mouth two (2) times daily (with meals). Lexus Hogan MD   cholecalciferol (VITAMIN D3) (2,000 UNITS /50 MCG) cap capsule Take 2,000 Units by mouth two (2) times a day. Take two tabs a total of 4000 units    Lexus Hogan MD   gabapentin (NEURONTIN) 100 mg capsule Take 100 mg by mouth nightly. Lexus Hogan MD   latanoprost (XALATAN) 0.005 % ophthalmic solution Administer 1 Drop to both eyes nightly. One drop at bedtime    Lexus Hogan MD   levothyroxine (SYNTHROID) 125 mcg tablet Take 125 mcg by mouth Daily (before breakfast). Lexus Hogan MD   omeprazole (PRILOSEC) 20 mg capsule Take 20 mg by mouth daily. Lexus Hogan MD   ondansetron (ZOFRAN ODT) 4 mg disintegrating tablet Take 4 mg by mouth every eight (8) hours as needed for Nausea or Vomiting. Lexus Hogan MD   B.infantis-B.ani-B.long-B.bifi (PROBIOTIC 4X) 10-15 mg TbEC Take  by mouth. Lexus Hogan MD   vit B Cmplx 3-FA-Vit C-Biotin (NEPHRO HOWIE RX) 1- mg-mg-mcg tablet Take 1 Tab by mouth daily. Lexus Hogan MD       Allergies   Allergen Reactions    Ciprofloxacin Hives    Statins-Hmg-Coa Reductase Inhibitors Other (comments)     Body ache       Review of Systems  Pertinent items are noted in the History of Present Illness. Physical Exam:      Visit Vitals  /46   Pulse 89   Temp 98 °F (36.7 °C)   Resp 18   Ht 5' 2\" (1.575 m)   Wt 109.3 kg (241 lb)   SpO2 96%   BMI 44.08 kg/m²       Physical Exam:  Constitutional: NAD. A&Ox4. Resting comfortably in bed. Respiratory: Normal respiratory effort. Symmetrical rise and fall of chest.   Cardiovascular: Regular rate. Gastrointestinal: Soft, NT, ND.  : Right PCN in place without erythema, inflammation or drainage around site.      Labs Reviewed:  CMP:   Lab Results   Component Value Date/Time     02/21/2020 05:07 AM    K 4.8 02/21/2020 05:07 AM     (H) 02/21/2020 05:07 AM    CO2 26 02/21/2020 05:07 AM    AGAP 5 02/21/2020 05:07 AM     (H) 02/21/2020 05:07 AM    BUN 43 (H) 02/21/2020 05:07 AM    CREA 4.06 (H) 02/21/2020 05:07 AM    GFRAA 13 (L) 02/21/2020 05:07 AM    GFRNA 11 (L) 02/21/2020 05:07 AM    CA 8.0 (L) 02/21/2020 05:07 AM    ALB 2.7 (L) 02/21/2020 05:07 AM    TP 6.6 02/21/2020 05:07 AM    GLOB 3.9 02/21/2020 05:07 AM    AGRAT 0.7 (L) 02/21/2020 05:07 AM    SGOT 16 02/21/2020 05:07 AM    ALT 10 (L) 02/21/2020 05:07 AM     CBC:   Lab Results   Component Value Date/Time    WBC 6.0 02/21/2020 05:07 AM    HGB 8.5 (L) 02/21/2020 05:07 AM    HCT 27.4 (L) 02/21/2020 05:07 AM     02/21/2020 05:07 AM     COAGS: No results found for: APTT, PTP, INR, INREXT, INREXT    Imaging:  CT ABDOMEN AND PELVIS WITHOUT CONTRAST     COMPARISON: None.     INDICATIONS: Hematuria, chronic right nephrostomy. ,.     TECHNIQUE: Volumetric data acquisition was performed of the abdomen and pelvis  on a multislice scanner and reconstructed in axial coronal and sagittal planes     CT ABDOMEN FINDINGS:      Lung Bases: Clear.     Liver/Gallbladder/Biliary: Gallbladder surgically absent. The liver and biliary  tree unremarkable.     Spleen: Normal.     Adrenal Glands: Normal.     Kidneys: Right kidney is lobulated with a nephrostomy tube in place. Hypodensity  suggestive of cysts are present. Not all can be characterized. he collecting  system is not dilated. The left kidney is also lobulated. Exophytic hypodensity  is consistent with a cyst. No hydronephrosis. . No calculi evident     Pancreas: Normal.     Stomach, Small Bowel, and Colon: Gastric bypass. Small bowel unremarkable. The  appendix is not well demonstrated. The colon contains a rare diverticuli but is  otherwise nremarkable.     Lymph Nodes: Normal in size and number.      The abdominal aorta is unremarkable.  The IVC is unremarkable.     Peritoneal Spaces: No free fluid or free air is present.     Abdominal wall: No hernia or mass is evident     Bladder: The bladder is markedly edematous with haziness of the perivesical fat.          Osseous Structures Of Abdomen And Pelvis: Unremarkable for age.     IMPRESSION  IMPRESSION:      Right kidney shows no dilation of the collecting system with nephrostomy in  place. Bilateral renal cysts. Alcohol the renal hypodensities characterized on this  noncontrast study; small solid nodule not excluded  Severe edema of the bladder and perivesical region. There is diverticulosis without diverticulitis  Assessment   Principal Problem:    Complicated urinary tract infection (2/20/2020)    Active Problems:    Pyelonephritis (2/20/2020)        Jagdish Simms is a 66 y.o. female with a history of recurrent UTIs presenting with new onset UTI in the setting of right PCN. Plan   Case and images reviewed by Dr. Justo Crook. Discussions held between Afua Shelton, NP and myself regarding management options. Given that she is acutely ill and has a PCN in place that may lead to catheter seeding and prevention of treatment, will plan for image-guided right percutaneous nephrostomy tube replacement with possible moderate sedation as IR schedule allows. Patient to remain NPO with blood thinning medications held. Consent to be obtained prior to procedure.

## 2020-02-21 NOTE — ROUTINE PROCESS
TRANSFER - OUT REPORT:    Verbal report given to Mo RN(name) on [place]  being transferred to 5N, Bed 549(unit) for routine progression of care       Report consisted of patients Situation, Background, Assessment and   Recommendations(SBAR). Information from the following report(s) SBAR and ED Summary was reviewed with the receiving nurse. Lines:       Opportunity for questions and clarification was provided.

## 2020-02-21 NOTE — PROGRESS NOTES
TRANSFER - OUT REPORT:    **NO SEDATION MEDS GIVEN**    Verbal report given to Angie Leon RN(name) on Nikita Thomason  being transferred to N(unit) for routine post - op       Report consisted of patients Situation, Background, Assessment and   Recommendations(SBAR). Information from the following report(s) SBAR, Kardex, Procedure Summary and MAR was reviewed with the receiving nurse. Lines:   Peripheral IV 02/20/20 Right Wrist (Active)   Site Assessment Clean, dry, & intact 2/21/2020  7:21 AM   Phlebitis Assessment 0 2/21/2020  7:21 AM   Infiltration Assessment 0 2/21/2020  7:21 AM   Dressing Status Clean, dry, & intact 2/21/2020  7:21 AM   Dressing Type Tape;Transparent 2/21/2020  7:21 AM   Hub Color/Line Status Blue 2/21/2020  7:21 AM   Alcohol Cap Used Yes 2/21/2020  7:21 AM        Opportunity for questions and clarification was provided.       Patient transported with:   [employer]

## 2020-02-21 NOTE — CONSULTS
Consult Note  Consult requested by: Dr. Julio Vo is a 66 y.o. female [facility] who is being seen on consult for CKD  Chief Complaint   Patient presents with    Urinary Pain    Blood in Urine     Admission diagnosis: Complicated urinary tract infection   12-year-old female with a past medical history of hypertension, diabetes, CKD, history of recurrent nephrolithiasis,  History of right nephrostomy tube, history of recurrent UTI, admitted for sepsis UTI following for chronic kidney disease. Impression & Plan:   IMPRESSION:   Chronic kidney disease stage V, from multiple JADON as well as diabetes and hypertension she has left arm brachiocephalic fistula  Anemia  Sepsis, complicated UTI  History of recurrent nephrolithiasis  Hypertension   PLAN:   No urgent or emergent indications during this hospitalization to start dialysis. She has appointment next week with vascular to assess patency of her access. Plan to start her on dialysis outpatient. Continue antibiotic per primary team.  Check anemia panel,  PTH. Adjust medication per ESRD status. Discussed with primary team.   Thank you very much for allowing me to participate in the care of this patient. We will continue to monitor with you and make recommendations as needed.         HPI: 12-year-old female with past medical history of hypertension, diabetes, stage IV chronic kidney disease, history of nephrolithiasis, status post right nephrostomy tube placement, history of recurrent UTI and subsequent severe C. difficile infection in the past was sent to emergency room from primary care clinic for UTI. Ms. Sonia Vogel has a known history of recurrent UTI in the past.  Recently she has been complaining of dysuria, urinary hesitancy as well as her urine looks very clouded. Given her history of complicated UTIs he was sent to the emergency room. She recently moved from [place] to [place].   She is just started since following with me in CKD clinic this month. In emergency room her work-up shows no leukocytosis. She has very cross turbid appearing urine. Her serum chemistry shows BUN at 43 and creatinine at 4 mg per DL. Her CT abdomen without contrast shows no dilatation of right kidney collecting system, nephrostomy in right place. She was evaluated by infectious disease as well as interventional radiology colleague. Given her complicated UTI plan for exchange her nephrostomy tube today. During my evaluation she denies for any nausea, vomiting, short of breath. No past medical history on file. No past surgical history on file.     Social History     Socioeconomic History    Marital status: SINGLE     Spouse name: Not on file    Number of children: Not on file    Years of education: Not on file    Highest education level: Not on file   Occupational History    Not on file   Social Needs    Financial resource strain: Not on file    Food insecurity:     Worry: Not on file     Inability: Not on file    Transportation needs:     Medical: Not on file     Non-medical: Not on file   Tobacco Use    Smoking status: Never Smoker    Smokeless tobacco: Never Used   Substance and Sexual Activity    Alcohol use: Never     Frequency: Never    Drug use: Never    Sexual activity: Not on file   Lifestyle    Physical activity:     Days per week: Not on file     Minutes per session: Not on file    Stress: Not on file   Relationships    Social connections:     Talks on phone: Not on file     Gets together: Not on file     Attends [employer] service: Not on file     Active member of club or organization: Not on file     Attends meetings of clubs or organizations: Not on file     Relationship status: Not on file    Intimate partner violence:     Fear of current or ex partner: Not on file     Emotionally abused: Not on file     Physically abused: Not on file     Forced sexual activity: Not on file   Other Topics Concern  Not on file   Social History Narrative    Not on file       No family history on file. Allergies   Allergen Reactions    Ciprofloxacin Hives    Statins-Hmg-Coa Reductase Inhibitors Other (comments)     Body ache        Home Medications:     Prior to Admission Medications   Prescriptions Last Dose Informant Patient Reported? Taking? B.infantis-B.ani-B.long-B.bifi (PROBIOTIC 4X) 10-15 mg TbEC   Yes No   Sig: Take  by mouth. acetaminophen (TYLENOL) 325 mg tablet   Yes No   Sig: Take 650 mg by mouth two (2) times a day. allopurinoL (ZYLOPRIM) 100 mg tablet   Yes No   Sig: Take 200 mg by mouth daily. amLODIPine (NORVASC) 5 mg tablet   Yes No   Sig: Take 5 mg by mouth daily. ascorbic acid, vitamin C, (VITAMIN C) 500 mg tablet   Yes No   Sig: Take 500 mg by mouth.   calcitRIOL (ROCALTROL) 0.25 mcg capsule   Yes No   Sig: Take 0.25 mcg by mouth every other day. carvediloL (COREG) 12.5 mg tablet   Yes No   Sig: Take  by mouth two (2) times daily (with meals). cholecalciferol (VITAMIN D3) (2,000 UNITS /50 MCG) cap capsule   Yes No   Sig: Take 2,000 Units by mouth two (2) times a day. Take two tabs a total of 4000 units   cyclobenzaprine (FLEXERIL) 5 mg tablet 1/20/2020 at Unknown time  Yes Yes   Sig: Take 5 mg by mouth daily. gabapentin (NEURONTIN) 100 mg capsule   Yes No   Sig: Take 100 mg by mouth nightly. latanoprost (XALATAN) 0.005 % ophthalmic solution   Yes No   Sig: Administer 1 Drop to both eyes nightly. One drop at bedtime   levothyroxine (SYNTHROID) 125 mcg tablet   Yes No   Sig: Take 125 mcg by mouth Daily (before breakfast). omeprazole (PRILOSEC) 20 mg capsule   Yes No   Sig: Take 20 mg by mouth daily. ondansetron (ZOFRAN ODT) 4 mg disintegrating tablet   Yes No   Sig: Take 4 mg by mouth every eight (8) hours as needed for Nausea or Vomiting. vit B Cmplx 3-FA-Vit C-Biotin (NEPHRO HOWIE RX) 1- mg-mg-mcg tablet   Yes No   Sig: Take 1 Tab by mouth daily.       Facility-Administered Medications: None       Current Facility-Administered Medications   Medication Dose Route Frequency    cyclobenzaprine (FLEXERIL) tablet 5 mg  5 mg Oral DAILY    tamsulosin (FLOMAX) capsule 0.4 mg  0.4 mg Oral DAILY    lactobacillus sp. 50 billion cpu (BIO-K PLUS) capsule 1 Cap  1 Cap Oral DAILY    cefTRIAXone (ROCEPHIN) 1 g in sterile water (preservative free) 10 mL IV syringe  1 g IntraVENous Q24H    acetaminophen (TYLENOL) tablet 650 mg  650 mg Oral BID    [Held by provider] allopurinoL (ZYLOPRIM) tablet 200 mg  200 mg Oral DAILY    amLODIPine (NORVASC) tablet 5 mg  5 mg Oral DAILY    ascorbic acid (vitamin C) (VITAMIN C) tablet 500 mg  500 mg Oral DAILY    calcitRIOL (ROCALTROL) capsule 0.25 mcg  0.25 mcg Oral EVERY OTHER DAY    carvediloL (COREG) tablet 12.5 mg  12.5 mg Oral BID WITH MEALS    cholecalciferol (VITAMIN D3) (1000 Units /25 mcg) tablet 4 Tab  4,000 Units Oral DAILY    [Held by provider] gabapentin (NEURONTIN) capsule 100 mg  100 mg Oral TID    latanoprost (XALATAN) 0.005 % ophthalmic solution 1 Drop  1 Drop Both Eyes QHS    levothyroxine (SYNTHROID) tablet 125 mcg  125 mcg Oral ACB    ondansetron (ZOFRAN ODT) tablet 4 mg  4 mg Oral Q8H PRN    B complex-vitaminC-folic acid (NEPHROCAP) cap  1 Cap Oral DAILY    fluticasone propionate (FLONASE) 50 mcg/actuation nasal spray 2 Spray  2 Spray Both Nostrils DAILY    heparin (porcine) injection 5,000 Units  5,000 Units SubCUTAneous Q8H    0.9% sodium chloride infusion  100 mL/hr IntraVENous CONTINUOUS    pantoprazole (PROTONIX) tablet 40 mg  40 mg Oral ACB    sodium bicarbonate tablet 1,300 mg  1,300 mg Oral TID     Facility-Administered Medications Ordered in Other Encounters   Medication Dose Route Frequency    ceFAZolin (ANCEF) 2g IVPB in 50 mL D5W  2 g IntraVENous RAD ONCE    fentaNYL citrate (PF) injection 12.5-50 mcg  12.5-50 mcg IntraVENous Multiple    midazolam (VERSED) injection 1 mg  1 mg IntraVENous Multiple       Review of Systems:      Complete 10-point review of systems were obtained and discussed in length  with the patient. Complete review of systems was negative/unremarkable  except as mentioned in HPI section. Data Review:    Labs: Results:       Chemistry Recent Labs     02/21/20  0507 02/20/20 2104   * 120*    143   K 4.8 4.8   * 111   CO2 26 26   BUN 43* 42*   CREA 4.06* 4.27*   CA 8.0* 8.7   AGAP 5 6   BUCR 11* 10*    129*   TP 6.6 8.2   ALB 2.7* 3.5   GLOB 3.9 4.7*   AGRAT 0.7* 0.7*      CBC w/Diff Recent Labs     02/21/20  0507 02/20/20 2104   WBC 6.0 8.8   RBC 2.79* 3.27*   HGB 8.5* 10.0*   HCT 27.4* 32.1*    223   GRANS 34* 49   LYMPH 53* 39   EOS 3 3      Coagulation No results for input(s): PTP, INR, APTT, INREXT in the last 72 hours. Iron/Ferritin No results for input(s): IRON in the last 72 hours. No lab exists for component: TIBCCALC   BNP No results for input(s): BNPP in the last 72 hours. Cardiac Enzymes No results for input(s): CPK, CKND1, PATTI in the last 72 hours. No lab exists for component: CKRMB, TROIP   Liver Enzymes Recent Labs     02/21/20  0507   TP 6.6   ALB 2.7*      SGOT 16      Thyroid Studies No results found for: T4, T3U, TSH, TSHEXT      EKG:    Physical Assessment:     Visit Vitals  /46   Pulse 89   Temp 98 °F (36.7 °C)   Resp 18   Ht 5' 2\" (1.575 m)   Wt 109.3 kg (241 lb)   SpO2 96%   BMI 44.08 kg/m²     Weight change:     Intake/Output Summary (Last 24 hours) at 2/21/2020 1453  Last data filed at 2/21/2020 0107  Gross per 24 hour   Intake 1570 ml   Output 300 ml   Net 1270 ml     Physical Exam:   General: comfortable, no acute distress   HEENT sclera anicteric, supple neck,  CVS: S1S2 heard,  no rub  RS: + air entry b/l,   Abd: Soft, Non tender, Not distended, right side nephrostomy tube.    Neuro: non focal, awake, alert , CN II-XII are grossly intact  Extrm: + edema, no cyanosis, clubbing   Skin: no visible  Rash  Musculoskeletal: No gross joints or bone deformities     Procedures/imaging: see electronic medical records for all procedures, Xrays and details which were not copied into this note but were reviewed prior to creation of Jennifer Ro MD  February 21, 2020  [facility]  Office [phone]

## 2020-02-21 NOTE — CONSULTS
[address], [address]                                                      Phone: [phone]  Urology Consult Note             1. Pyelonephritis          Assessment & Plan   Assessment  Paty Hampton is a 66year old female:    - Admitted for IV abx for tx of pyelo and possible dialysis 2/2 JADON  WBC normal, afebrile  - Frequent/recurrent urinary tract infection, including hospitalization  Cystoscopy 1/12/17 significantly negative for tumors. Small cellules noted, perhaps a tiny reservoir for bacteria. Intravesical gentamicin has been used in the past  Right hydronephrosis; right distal ureteral stricture dilation 7/23/18 (Dr. Myesha Prince) with rapid recurrence of right hydronephrosis following stent removal  Nephrostomy tube placed 9/4/18 at [facility]  Under the Infectious Diseases care of Dr. Kirstin Hilton  - Urinary incontinence  - Nocturia  -CKD stage 5  Left upper extremity AV fistula established 01/2019   -History of C. difficile colitis      Plan:  Ucx pending. Abx per ID- appreciate assistance  Check bladder scans every 4 hours  Continue daily vitamin C, cranberry juice, probiotics, and d-mannose  Right nephrostomy tube is exchanged every 6 weeks last changed in January 2020. Due to recent UTI recommend having PCN exchanged by IR today or Monday if still inpatient otherwise can exchange as outpt  Consider UDS and or Pelvic Floor PT as outpt  Nephrology plans to start Dialysis as outpt  Follow Up arranged: YES patient has an appointment already scheduled with Dr. Ricardo Russo on 4/22/2020 at 8:30 am. She prefers to follow up with our [facility] office- office message to arrange  Will see again on Monday if still inpatient. Please contact sooner with any questions or concerns      Irina Peterson Mis  [facility]  Pager # [phone]    Available M-F 7:30- 5pm .  After 5pm and weekends please call answering service at [phone]        Attestation by Caty Arguelles MD    I agree with the findings as documented, and participated in the development of the care plan, any changes were made to the note as needed and any additional comments are below:     Pt comfortable. Spoke with her son. She is relocating here from [place]. PCN change pending. Caty Arguelles MD, [facility]  [facility]  Associate Prof PALMER  [phone] (pager)   [phone] (cell)                    Chief Complaint   Patient presents with    Urinary Pain    Blood in Urine         HISTORY OF PRESENT ILLNESS:    Safia Reynolds is a 66 y.o. female who is seen in consultation as referred by Dr. Chanda Dennis  for recurrent UTI and right PCN with need to establish urological care in [place]. Patient has been seen by a urologist and is currently receiving urological care with Dr. Marivel Wood MD in [place] [address]. Urological history is significant for Recurrent UTIs, Right hydronephrosis; right distal ureteral stricture dilation 7/23/18 (Dr. Gretchen Nieves) with rapid recurrence of right hydronephrosis following stent removal. Nephrostomy tube placed 9/4/18 at [facility]. Under the Infectious Diseases care of Dr. Jaime Mack. Urinary Incontinence. HPI: Patient originally presented to the ER on 2/20/2020 with c/o suprapubic pain and burning sensation x 2-3 months. She was seen by her PCP earlier that day and was sent to ER for IV abx due to failed outpt tx for pyelo and possible dialysis due to worsening renal function. CT was performed and showed Right kidney shows no dilation of the collecting system with nephrostomy in place. Bilateral renal cysts. Alcohol the renal hypodensities characterized on this noncontrast study; small solid nodule not excluded Severe edema of the bladder and perivesical region. There is diverticulosis without diverticulitis. Patient is currently afebrile and VSS.  Patient reports baseline urinary symptoms including: Incontinence, straining to urinate, having the urge to urinate however unable to go when sit on toilet and then will be incontinent later on. Location  tract  Duration days   Associated signs/symptoms as above  Modifying factors abx  Severity moderate        No flowsheet data found. No past medical history on file. No past surgical history on file. Social History     Tobacco Use    Smoking status: Never Smoker    Smokeless tobacco: Never Used   Substance Use Topics    Alcohol use: Never     Frequency: Never    Drug use: Never       Allergies   Allergen Reactions    Ciprofloxacin Hives    Statins-Hmg-Coa Reductase Inhibitors Other (comments)     Body ache       No family history on file.     Current Facility-Administered Medications   Medication Dose Route Frequency Provider Last Rate Last Dose    cyclobenzaprine (FLEXERIL) tablet 5 mg  5 mg Oral DAILY Ayala Nichols MD   5 mg at 02/21/20 0046    lactobacillus sp. 50 billion cpu (BIO-K PLUS) capsule 1 Cap  1 Cap Oral DAILY Yesica Borges MD   1 Cap at 02/21/20 0843    cefTRIAXone (ROCEPHIN) 1 g in sterile water (preservative free) 10 mL IV syringe  1 g IntraVENous Q24H Yesica Borges MD   1 g at 02/20/20 2244    acetaminophen (TYLENOL) tablet 650 mg  650 mg Oral BID Yesica Borges MD   650 mg at 02/21/20 0843    [Held by provider] allopurinoL (ZYLOPRIM) tablet 200 mg  200 mg Oral DAILY Yesica Borges MD        amLODIPine (NORVASC) tablet 5 mg  5 mg Oral DAILY Yesica Borges MD        ascorbic acid (vitamin C) (VITAMIN C) tablet 500 mg  500 mg Oral DAILY Yesica Borges MD   500 mg at 02/21/20 0843    calcitRIOL (ROCALTROL) capsule 0.25 mcg  0.25 mcg Oral EVERY OTHER DAY Ayala Nichols MD   0.25 mcg at 02/20/20 2237    carvediloL (COREG) tablet 12.5 mg  12.5 mg Oral BID WITH MEALS Ayala Nichols MD        cholecalciferol (VITAMIN D3) (1000 Units /25 mcg) tablet 4 Tab  4,000 Units Oral Ottoniel Antonio MD   4 Tab at 02/21/20 0842    [Held by provider] gabapentin (NEURONTIN) capsule 100 mg  100 mg Oral TID Scarlett George MD        latanoprost (XALATAN) 0.005 % ophthalmic solution 1 Drop  1 Drop Both Eyes QHS Scarlett George MD   1 Drop at 02/20/20 2238    levothyroxine (SYNTHROID) tablet 125 mcg  125 mcg Oral ACB Scarlett George MD   125 mcg at 02/21/20 0635    ondansetron (ZOFRAN ODT) tablet 4 mg  4 mg Oral Q8H PRN Scarlett George MD        B complex-vitaminC-folic acid (NEPHROCAP) cap  1 Cap Oral DAILY Scarlett George MD   1 Cap at 02/21/20 0842    fluticasone propionate (FLONASE) 50 mcg/actuation nasal spray 2 Spray  2 Spray Both Nostrils DAILY Scarlett George MD   2 Spray at 02/21/20 0849    heparin (porcine) injection 5,000 Units  5,000 Units SubCUTAneous Q8H Scarlett George MD   5,000 Units at 02/21/20 0636    0.9% sodium chloride infusion  100 mL/hr IntraVENous CONTINUOUS Souleymane GARCIA  mL/hr at 02/20/20 2349 100 mL/hr at 02/20/20 2349    pantoprazole (PROTONIX) tablet 40 mg  40 mg Oral ACB Nicol Lewis MD   40 mg at 02/21/20 6893    sodium bicarbonate tablet 1,300 mg  1,300 mg Oral TID Scarlett George MD   1,300 mg at 02/21/20 2334       Review of Systems      A comprehensive review of systems was negative except for that written in the History of Present Illness. PHYSICAL EXAMINATION:   Visit Vitals  BP 95/62   Pulse 83   Temp 98.2 °F (36.8 °C)   Resp 20   SpO2 98%       Constitutional: Well developed, well nourished female. No acute distress. HEENT: Normocephalic, Atraumatic  CV:  RRR   Pulm: No respiratory distress or difficulties breathing   GI:  No abdominal masses or tenderness. :  No CVA tenderness. Right PCN: Draining yellow urine with some sediment and malodorous  Skin: No evidence of jaundice. Normal color  Neuro/Psych:  Alert and oriented. Affect appropriate.    Lymphatic:  No inguinal lymphadenopathy  MSK: FROM         REVIEW OF LABS AND IMAGING:      CT abd/ pelvis w/o contrast 2/20/2020  IMPRESSION:      Right kidney shows no dilation of the collecting system with nephrostomy in  place. Bilateral renal cysts. Alcohol the renal hypodensities characterized on this  noncontrast study; small solid nodule not excluded  Severe edema of the bladder and perivesical region. There is diverticulosis without diverticulitis    Adrenal Glands: Normal.     Kidneys: Right kidney is lobulated with a nephrostomy tube in place. Hypodensity  suggestive of cysts are present. Not all can be characterized. he collecting  system is not dilated. The left kidney is also lobulated. Exophytic hypodensity  is consistent with a cyst. No hydronephrosis. . No calculi evident           Labs: Results:   Chemistry    Recent Labs     02/21/20  0507 02/20/20 2104   * 120*    143   K 4.8 4.8   * 111   CO2 26 26   BUN 43* 42*   CREA 4.06* 4.27*   CA 8.0* 8.7   AGAP 5 6   BUCR 11* 10*    129*   TP 6.6 8.2   ALB 2.7* 3.5   GLOB 3.9 4.7*   AGRAT 0.7* 0.7*      CBC w/Diff Recent Labs     02/21/20  0507 02/20/20 2104   WBC 6.0 8.8   RBC 2.79* 3.27*   HGB 8.5* 10.0*   HCT 27.4* 32.1*    223   GRANS 34* 49   LYMPH 53* 39   EOS 3 3      Cultures No results for input(s): CULT in the last 72 hours.   All Micro Results     Procedure Component Value Units Date/Time    CULTURE, URINE [[id number]] Collected:  02/20/20 2320    Order Status:  Completed Specimen:  Urine from Clean catch Updated:  02/21/20 0134    CULTURE, URINE [[id number]]     Order Status:  Canceled Specimen:  Urine from Clean catch             Urinalysis Color   Date Value Ref Range Status   02/20/2020 YELLOW   Final     Appearance   Date Value Ref Range Status   02/20/2020 TURBID   Final     Specific gravity   Date Value Ref Range Status   02/20/2020 1.013 1.005 - 1.030   Final     pH (UA)   Date Value Ref Range Status   02/20/2020 6.5 5.0 - 8.0   Final     Protein   Date Value Ref Range Status 02/20/2020 100 (A) NEG mg/dL Final     Ketone   Date Value Ref Range Status   02/20/2020 NEGATIVE  NEG mg/dL Final     Bilirubin   Date Value Ref Range Status   02/20/2020 NEGATIVE  NEG   Final     Blood   Date Value Ref Range Status   02/20/2020 MODERATE (A) NEG   Final     Urobilinogen   Date Value Ref Range Status   02/20/2020 1.0 0.2 - 1.0 EU/dL Final     Nitrites   Date Value Ref Range Status   02/20/2020 NEGATIVE  NEG   Final     Leukocyte Esterase   Date Value Ref Range Status   02/20/2020 LARGE (A) NEG   Final     Potassium   Date Value Ref Range Status   02/21/2020 4.8 3.5 - 5.5 mmol/L Final     Creatinine   Date Value Ref Range Status   02/21/2020 4.06 (H) 0.6 - 1.3 MG/DL Final     BUN   Date Value Ref Range Status   02/21/2020 43 (H) 7.0 - 18 MG/DL Final      PSA No results for input(s): PSA in the last 72 hours.    Coagulation No results found for: PTP, INR, APTT, INREXT

## 2020-02-22 ENCOUNTER — APPOINTMENT (OUTPATIENT)
Dept: VASCULAR SURGERY | Age: 75
DRG: 690 | End: 2020-02-22
Attending: INTERNAL MEDICINE
Payer: MEDICARE

## 2020-02-22 LAB
ALBUMIN SERPL-MCNC: 2.5 G/DL (ref 3.4–5)
ALBUMIN/GLOB SERPL: 0.7 {RATIO} (ref 0.8–1.7)
ALP SERPL-CCNC: 106 U/L (ref 45–117)
ALT SERPL-CCNC: 10 U/L (ref 13–56)
ANION GAP SERPL CALC-SCNC: 6 MMOL/L (ref 3–18)
AST SERPL-CCNC: 13 U/L (ref 10–38)
BASOPHILS # BLD: 0 K/UL (ref 0–0.1)
BASOPHILS NFR BLD: 0 % (ref 0–2)
BILIRUB SERPL-MCNC: 0.2 MG/DL (ref 0.2–1)
BUN SERPL-MCNC: 37 MG/DL (ref 7–18)
BUN/CREAT SERPL: 10 (ref 12–20)
CALCIUM SERPL-MCNC: 7.7 MG/DL (ref 8.5–10.1)
CHLORIDE SERPL-SCNC: 114 MMOL/L (ref 100–111)
CO2 SERPL-SCNC: 25 MMOL/L (ref 21–32)
CREAT SERPL-MCNC: 3.6 MG/DL (ref 0.6–1.3)
DIFFERENTIAL METHOD BLD: ABNORMAL
EOSINOPHIL # BLD: 0.2 K/UL (ref 0–0.4)
EOSINOPHIL NFR BLD: 3 % (ref 0–5)
ERYTHROCYTE [DISTWIDTH] IN BLOOD BY AUTOMATED COUNT: 16.1 % (ref 11.6–14.5)
GLOBULIN SER CALC-MCNC: 3.7 G/DL (ref 2–4)
GLUCOSE SERPL-MCNC: 98 MG/DL (ref 74–99)
HCT VFR BLD AUTO: 25.8 % (ref 35–45)
HGB BLD-MCNC: 8.1 G/DL (ref 12–16)
LYMPHOCYTES # BLD: 2.4 K/UL (ref 0.9–3.6)
LYMPHOCYTES NFR BLD: 46 % (ref 21–52)
MCH RBC QN AUTO: 30.8 PG (ref 24–34)
MCHC RBC AUTO-ENTMCNC: 31.4 G/DL (ref 31–37)
MCV RBC AUTO: 98.1 FL (ref 74–97)
MONOCYTES # BLD: 0.5 K/UL (ref 0.05–1.2)
MONOCYTES NFR BLD: 9 % (ref 3–10)
NEUTS SEG # BLD: 2.2 K/UL (ref 1.8–8)
NEUTS SEG NFR BLD: 42 % (ref 40–73)
PLATELET # BLD AUTO: 141 K/UL (ref 135–420)
PMV BLD AUTO: 10.4 FL (ref 9.2–11.8)
POTASSIUM SERPL-SCNC: 4.5 MMOL/L (ref 3.5–5.5)
PROT SERPL-MCNC: 6.2 G/DL (ref 6.4–8.2)
RBC # BLD AUTO: 2.63 M/UL (ref 4.2–5.3)
SODIUM SERPL-SCNC: 145 MMOL/L (ref 136–145)
WBC # BLD AUTO: 5.2 K/UL (ref 4.6–13.2)

## 2020-02-22 PROCEDURE — 74011250637 HC RX REV CODE- 250/637: Performed by: NURSE PRACTITIONER

## 2020-02-22 PROCEDURE — 51798 US URINE CAPACITY MEASURE: CPT

## 2020-02-22 PROCEDURE — 74011250636 HC RX REV CODE- 250/636: Performed by: STUDENT IN AN ORGANIZED HEALTH CARE EDUCATION/TRAINING PROGRAM

## 2020-02-22 PROCEDURE — 74011000250 HC RX REV CODE- 250: Performed by: STUDENT IN AN ORGANIZED HEALTH CARE EDUCATION/TRAINING PROGRAM

## 2020-02-22 PROCEDURE — 65270000029 HC RM PRIVATE

## 2020-02-22 PROCEDURE — 93990 DOPPLER FLOW TESTING: CPT

## 2020-02-22 PROCEDURE — 74011250637 HC RX REV CODE- 250/637: Performed by: STUDENT IN AN ORGANIZED HEALTH CARE EDUCATION/TRAINING PROGRAM

## 2020-02-22 PROCEDURE — 36415 COLL VENOUS BLD VENIPUNCTURE: CPT

## 2020-02-22 PROCEDURE — 85025 COMPLETE CBC W/AUTO DIFF WBC: CPT

## 2020-02-22 PROCEDURE — 80053 COMPREHEN METABOLIC PANEL: CPT

## 2020-02-22 RX ORDER — LANOLIN ALCOHOL/MO/W.PET/CERES
3 CREAM (GRAM) TOPICAL
Status: DISCONTINUED | OUTPATIENT
Start: 2020-02-22 | End: 2020-02-24 | Stop reason: HOSPADM

## 2020-02-22 RX ADMIN — MELATONIN TAB 3 MG 3 MG: 3 TAB at 22:50

## 2020-02-22 RX ADMIN — PANTOPRAZOLE SODIUM 40 MG: 40 TABLET, DELAYED RELEASE ORAL at 06:39

## 2020-02-22 RX ADMIN — LEVOTHYROXINE SODIUM 125 MCG: 0.1 TABLET ORAL at 06:39

## 2020-02-22 RX ADMIN — CYCLOBENZAPRINE 5 MG: 10 TABLET, FILM COATED ORAL at 09:21

## 2020-02-22 RX ADMIN — SODIUM BICARBONATE 650 MG TABLET 1300 MG: at 16:58

## 2020-02-22 RX ADMIN — AMLODIPINE BESYLATE 5 MG: 5 TABLET ORAL at 09:21

## 2020-02-22 RX ADMIN — FLUTICASONE PROPIONATE 2 SPRAY: 50 SPRAY, METERED NASAL at 09:00

## 2020-02-22 RX ADMIN — SODIUM CHLORIDE 100 ML/HR: 900 INJECTION, SOLUTION INTRAVENOUS at 18:22

## 2020-02-22 RX ADMIN — Medication 500 MG: at 09:21

## 2020-02-22 RX ADMIN — HEPARIN SODIUM 5000 UNITS: 5000 INJECTION INTRAVENOUS; SUBCUTANEOUS at 06:23

## 2020-02-22 RX ADMIN — VITAMIN D, TAB 1000IU (100/BT) 4 TABLET: 25 TAB at 09:21

## 2020-02-22 RX ADMIN — SODIUM BICARBONATE 650 MG TABLET 1300 MG: at 21:17

## 2020-02-22 RX ADMIN — NEPHROCAP 1 CAPSULE: 1 CAP ORAL at 09:21

## 2020-02-22 RX ADMIN — CARVEDILOL 12.5 MG: 12.5 TABLET, FILM COATED ORAL at 16:58

## 2020-02-22 RX ADMIN — ACETAMINOPHEN 650 MG: 325 TABLET ORAL at 18:22

## 2020-02-22 RX ADMIN — CARVEDILOL 12.5 MG: 12.5 TABLET, FILM COATED ORAL at 09:21

## 2020-02-22 RX ADMIN — Medication 1 CAPSULE: at 09:21

## 2020-02-22 RX ADMIN — CALCITRIOL CAPSULES 0.25 MCG 0.25 MCG: 0.25 CAPSULE ORAL at 21:17

## 2020-02-22 RX ADMIN — ACETAMINOPHEN 650 MG: 325 TABLET ORAL at 09:21

## 2020-02-22 RX ADMIN — LATANOPROST 1 DROP: 50 SOLUTION OPHTHALMIC at 21:17

## 2020-02-22 RX ADMIN — HEPARIN SODIUM 5000 UNITS: 5000 INJECTION INTRAVENOUS; SUBCUTANEOUS at 16:59

## 2020-02-22 RX ADMIN — SODIUM BICARBONATE 650 MG TABLET 1300 MG: at 09:20

## 2020-02-22 RX ADMIN — CEFTRIAXONE SODIUM 1 G: 1 INJECTION, POWDER, FOR SOLUTION INTRAMUSCULAR; INTRAVENOUS at 18:22

## 2020-02-22 RX ADMIN — TAMSULOSIN HYDROCHLORIDE 0.4 MG: 0.4 CAPSULE ORAL at 09:21

## 2020-02-22 NOTE — ROUTINE PROCESS
2037 Patient ambulated to bathroom with walker. Patient has neprostomy tube in place draining yellow urine, and periods of incontinency . Patient denies pain at this time.

## 2020-02-22 NOTE — PROGRESS NOTES
Reason for Admission:  Pyelonephritis [P73]  Complicated urinary tract infection [N39.0]                 RRAT Score:   19%           Plan for utilizing home health: To be determined                    Likelihood of Readmission:   Moderate                         Do you (patient/family) have any concerns for transition/discharge? yes    Transition of Care Plan:       Initial assessment completed with patient. Cognitive status of patient: oriented to time, place, person and situation. Face sheet information confirmed:  yes. The patient designates Jo Rubio, mark ([phone]) to participate in her discharge plan and to receive any needed information. This patient lives in a single family home with son and daughter-in-law. Patient was able to navigate steps as needed. Prior to hospitalization, patient was considered to be independent with ADLs/IADLS : yes . Patient has a current ACP document on file: no  The patient 's son will be available to transport patient home upon discharge. The patient already has [facility] medical equipment available in the home. Patient is not currently active with home health. Patient has not stayed in a skilled nursing facility or rehab. This patient is on dialysis :no    List of available Home Health agencies were provided and reviewed with the patient prior to discharge. Freedom of choice signed: no.     Currently, the discharge plan is To be determined. Will monitor for PT/OT notes for recommendations. The patient states that she can obtain her medications from the pharmacy, and take her medications as directed. Patient's current insurance is [employer]      Care Management Interventions  PCP Verified by CM:  Yes  Last Visit to PCP: 02/13/20  Mode of Transport at Discharge: Self  Transition of Care Consult (CM Consult): Discharge Planning  MyChart Signup: No  Discharge Durable Medical Equipment: No  Physical Therapy Consult: Yes  Occupational Therapy Consult: Yes  Speech Therapy Consult: No  Current Support Network: Relative's Home  Confirm Follow Up Transport: Self  Discharge Location  Discharge Placement: (to be determined. )      CHARITY AddisonN, RN  Pager # [phone]  Care Manager

## 2020-02-22 NOTE — ROUTINE PROCESS
Bedside and Verbal shift change report given to [address] [place] [address] (oncoming nurse) by Calos Weldon RN (offgoing nurse). Report included the following information Kardex, Procedure Summary, Intake/Output, MAR and Recent Results.

## 2020-02-22 NOTE — PROGRESS NOTES
Progress Note    66-year-old female with a past medical history of hypertension, diabetes, CKD, history of recurrent nephrolithiasis,  History of right nephrostomy tube, history of recurrent UTI, admitted for sepsis UTI following for chronic kidney disease. Subjective      Feels better,   No fever   IMPRESSION:   Chronic kidney disease stage V, from multiple JADON as well as diabetes and hypertension she has left arm brachiocephalic fistula  Anemia  Sepsis, complicated UTI  History of recurrent nephrolithiasis  Hypertension  Secondary hyperparathyroid, on calcitriol   PLAN:   Will check duplex usg of her access. No urgent or emergent indications during this hospitalization to start dialysis. She has appointment next week with vascular to assess patency of her access. Plan to start her on dialysis outpatient. Continue antibiotic per primary team.  Adjust medication per ESRD status.      Discussed with primary team.              Facility-Administered Medications: None       Current Facility-Administered Medications   Medication Dose Route Frequency    cyclobenzaprine (FLEXERIL) tablet 5 mg  5 mg Oral DAILY    tamsulosin (FLOMAX) capsule 0.4 mg  0.4 mg Oral DAILY    lactobacillus sp. 50 billion cpu (BIO-K PLUS) capsule 1 Cap  1 Cap Oral DAILY    cefTRIAXone (ROCEPHIN) 1 g in sterile water (preservative free) 10 mL IV syringe  1 g IntraVENous Q24H    acetaminophen (TYLENOL) tablet 650 mg  650 mg Oral BID    [Held by provider] allopurinoL (ZYLOPRIM) tablet 200 mg  200 mg Oral DAILY    amLODIPine (NORVASC) tablet 5 mg  5 mg Oral DAILY    ascorbic acid (vitamin C) (VITAMIN C) tablet 500 mg  500 mg Oral DAILY    calcitRIOL (ROCALTROL) capsule 0.25 mcg  0.25 mcg Oral EVERY OTHER DAY    carvediloL (COREG) tablet 12.5 mg  12.5 mg Oral BID WITH MEALS    cholecalciferol (VITAMIN D3) (1000 Units /25 mcg) tablet 4 Tab  4,000 Units Oral DAILY    [Held by provider] gabapentin (NEURONTIN) capsule 100 mg  100 mg Oral TID    latanoprost (XALATAN) 0.005 % ophthalmic solution 1 Drop  1 Drop Both Eyes QHS    levothyroxine (SYNTHROID) tablet 125 mcg  125 mcg Oral ACB    ondansetron (ZOFRAN ODT) tablet 4 mg  4 mg Oral Q8H PRN    B complex-vitaminC-folic acid (NEPHROCAP) cap  1 Cap Oral DAILY    fluticasone propionate (FLONASE) 50 mcg/actuation nasal spray 2 Spray  2 Spray Both Nostrils DAILY    heparin (porcine) injection 5,000 Units  5,000 Units SubCUTAneous Q8H    0.9% sodium chloride infusion  100 mL/hr IntraVENous CONTINUOUS    pantoprazole (PROTONIX) tablet 40 mg  40 mg Oral ACB    sodium bicarbonate tablet 1,300 mg  1,300 mg Oral TID       Review of Systems:      Complete 10-point review of systems were obtained and discussed in length  with the patient. Complete review of systems was negative/unremarkable  except as mentioned in HPI section. Data Review:    Labs: Results:       Chemistry Recent Labs     02/22/20  0406 02/21/20  1623 02/21/20  0507 02/20/20  2104   GLU 98  --  103* 120*     --  143 143   K 4.5  --  4.8 4.8   *  --  112* 111   CO2 25  --  26 26   BUN 37*  --  43* 42*   CREA 3.60*  --  4.06* 4.27*   CA 7.7* 8.3* 8.0* 8.7   AGAP 6  --  5 6   BUCR 10*  --  11* 10*     --  114 129*   TP 6.2*  --  6.6 8.2   ALB 2.5*  --  2.7* 3.5   GLOB 3.7  --  3.9 4.7*   AGRAT 0.7*  --  0.7* 0.7*      CBC w/Diff Recent Labs     02/22/20  0406 02/21/20  0507 02/20/20  2104   WBC 5.2 6.0 8.8   RBC 2.63* 2.79* 3.27*   HGB 8.1* 8.5* 10.0*   HCT 25.8* 27.4* 32.1*    179 223   GRANS 42 34* 49   LYMPH 46 53* 39   EOS 3 3 3      Coagulation No results for input(s): PTP, INR, APTT, INREXT, INREXT in the last 72 hours. Iron/Ferritin Recent Labs     02/21/20  1623   IRON 57      BNP No results for input(s): BNPP in the last 72 hours. Cardiac Enzymes No results for input(s): CPK, CKND1, PATTI in the last 72 hours.     No lab exists for component: CKRMB, TROIP   Liver Enzymes Recent Labs 02/22/20  0406   TP 6.2*   ALB 2.5*      SGOT 13      Thyroid Studies No results found for: T4, T3U, TSH, TSHEXT, TSHEXT      EKG:    Physical Assessment:     Visit Vitals  /60   Pulse 83   Temp 97.7 °F (36.5 °C)   Resp 16   Ht 5' 2\" (1.575 m)   Wt 109.3 kg (241 lb)   SpO2 97%   BMI 44.08 kg/m²     Weight change:     Intake/Output Summary (Last 24 hours) at 2/22/2020 1306  Last data filed at 2/22/2020 6954  Gross per 24 hour   Intake 2285 ml   Output 450 ml   Net 1835 ml     Physical Exam:   General: comfortable, no acute distress   HEENT sclera anicteric, supple neck,  CVS: S1S2 heard,  no rub  RS: + air entry b/l,   Abd: Soft, Non tender, Not distended, right side nephrostomy tube.    Neuro: non focal, awake, alert , CN II-XII are grossly intact  Extrm: + edema, no cyanosis, clubbing   Skin: no visible  Rash  Musculoskeletal: No gross joints or bone deformities     Procedures/imaging: see electronic medical records for all procedures, Xrays and details which were not copied into this note but were reviewed prior to creation of Bailee Medina MD  February 22, 2020  [facility]  Office [phone]

## 2020-02-22 NOTE — PROGRESS NOTES
Problem: Pressure Injury - Risk of  Goal: *Prevention of pressure injury  Description  Document Giovany Scale and appropriate interventions in the flowsheet.   Outcome: Progressing Towards Goal  Note: Pressure Injury Interventions:       Moisture Interventions: Absorbent underpads    Activity Interventions: Pressure redistribution bed/mattress(bed type)    Mobility Interventions: Pressure redistribution bed/mattress (bed type)    Nutrition Interventions: Document food/fluid/supplement intake    Friction and Shear Interventions: Lift sheet                Problem: Patient Education: Go to Patient Education Activity  Goal: Patient/Family Education  Outcome: Progressing Towards Goal     Problem: Patient Education: Go to Patient Education Activity  Goal: Patient/Family Education  Outcome: Progressing Towards Goal     Problem: Pain  Goal: *Control of Pain  Outcome: Progressing Towards Goal     Problem: Urinary Tract Infection - Adult  Goal: *Absence of infection signs and symptoms  Outcome: Progressing Towards Goal     Problem: Nutrition Deficit  Goal: *Optimize nutritional status  Outcome: Progressing Towards Goal

## 2020-02-22 NOTE — PROGRESS NOTES
Intern Progress Note  [facility]       Patient: Bebo Abraham MRN: [id number]  CSN: [id number]    YOB: 1945  Age: 66 y.o. Sex: female    DOA: 2/20/2020 LOS:  LOS: 2 days                    Subjective:     Acute events: No acute events overnight. Pt had Nephrostomy tube replaced yesterday. Pt says bladder spasm pain greatly improved on abx. No Watery stools. Mild ache at procedure site. Pt still incontinent but this is her baseline    Review of Systems   Constitutional: Negative for chills and fever. Respiratory: Negative for cough and hemoptysis. Cardiovascular: Negative for chest pain and palpitations. Gastrointestinal: Negative for nausea and vomiting. Genitourinary: Negative for dysuria. Neurological: Negative for dizziness and headaches. Objective:      Patient Vitals for the past 24 hrs:   Temp Pulse Resp BP SpO2   02/22/20 0503 97.9 °F (36.6 °C) 81 18 114/60 98 %   02/21/20 2126 97.7 °F (36.5 °C) 82 18 110/57 96 %   02/21/20 1840 97.7 °F (36.5 °C) 84 18 95/57 97 %   02/21/20 1359 98 °F (36.7 °C) 89 18 115/46 96 %         Intake/Output Summary (Last 24 hours) at 2/22/2020 1102  Last data filed at 2/22/2020 7350  Gross per 24 hour   Intake 2285 ml   Output 450 ml   Net 1835 ml       Physical Exam:   General:  AAOx3,  In no distress   HEENT: Oral Mucosa dry, poor dentition  Heart: RRR, no m/r/g  Lungs: CTAB, no r/r/w  Abdomen: Soft, obese, non-tender today, New neprhrostomy tube in place, covered in bandage, no eryhthema noted around the tube, appropriate mild tenderness  Neuro Exam: No focal neuro deficits seen moving all extremities   Skin:  No rashes, lesions, or ulcers.  Good turgor.   Extremities: 1+ pitting edema in b/l lower extremities, LUE has AV fistula      Lab/Data Reviewed:    Recent Results (from the past 12 hour(s))   METABOLIC PANEL, COMPREHENSIVE    Collection Time: 02/22/20  4:06 AM   Result Value Ref Range    Sodium 145 136 - 145 mmol/L Potassium 4.5 3.5 - 5.5 mmol/L    Chloride 114 (H) 100 - 111 mmol/L    CO2 25 21 - 32 mmol/L    Anion gap 6 3.0 - 18 mmol/L    Glucose 98 74 - 99 mg/dL    BUN 37 (H) 7.0 - 18 MG/DL    Creatinine 3.60 (H) 0.6 - 1.3 MG/DL    BUN/Creatinine ratio 10 (L) 12 - 20      GFR est AA 15 (L) >60 ml/min/1.73m2    GFR est non-AA 12 (L) >60 ml/min/1.73m2    Calcium 7.7 (L) 8.5 - 10.1 MG/DL    Bilirubin, total 0.2 0.2 - 1.0 MG/DL    ALT (SGPT) 10 (L) 13 - 56 U/L    AST (SGOT) 13 10 - 38 U/L    Alk. phosphatase 106 45 - 117 U/L    Protein, total 6.2 (L) 6.4 - 8.2 g/dL    Albumin 2.5 (L) 3.4 - 5.0 g/dL    Globulin 3.7 2.0 - 4.0 g/dL    A-G Ratio 0.7 (L) 0.8 - 1.7     CBC WITH AUTOMATED DIFF    Collection Time: 02/22/20  4:06 AM   Result Value Ref Range    WBC 5.2 4.6 - 13.2 K/uL    RBC 2.63 (L) 4.20 - 5.30 M/uL    HGB 8.1 (L) 12.0 - 16.0 g/dL    HCT 25.8 (L) 35.0 - 45.0 %    MCV 98.1 (H) 74.0 - 97.0 FL    MCH 30.8 24.0 - 34.0 PG    MCHC 31.4 31.0 - 37.0 g/dL    RDW 16.1 (H) 11.6 - 14.5 %    PLATELET 807 503 - 099 K/uL    MPV 10.4 9.2 - 11.8 FL    NEUTROPHILS 42 40 - 73 %    LYMPHOCYTES 46 21 - 52 %    MONOCYTES 9 3 - 10 %    EOSINOPHILS 3 0 - 5 %    BASOPHILS 0 0 - 2 %    ABS. NEUTROPHILS 2.2 1.8 - 8.0 K/UL    ABS. LYMPHOCYTES 2.4 0.9 - 3.6 K/UL    ABS. MONOCYTES 0.5 0.05 - 1.2 K/UL    ABS. EOSINOPHILS 0.2 0.0 - 0.4 K/UL    ABS.  BASOPHILS 0.0 0.0 - 0.1 K/UL    DF AUTOMATED         Scheduled Medications Reviewed:  Current Facility-Administered Medications   Medication Dose Route Frequency    cyclobenzaprine (FLEXERIL) tablet 5 mg  5 mg Oral DAILY    tamsulosin (FLOMAX) capsule 0.4 mg  0.4 mg Oral DAILY    lactobacillus sp. 50 billion cpu (BIO-K PLUS) capsule 1 Cap  1 Cap Oral DAILY    cefTRIAXone (ROCEPHIN) 1 g in sterile water (preservative free) 10 mL IV syringe  1 g IntraVENous Q24H    acetaminophen (TYLENOL) tablet 650 mg  650 mg Oral BID    [Held by provider] allopurinoL (ZYLOPRIM) tablet 200 mg  200 mg Oral DAILY    amLODIPine (NORVASC) tablet 5 mg  5 mg Oral DAILY    ascorbic acid (vitamin C) (VITAMIN C) tablet 500 mg  500 mg Oral DAILY    calcitRIOL (ROCALTROL) capsule 0.25 mcg  0.25 mcg Oral EVERY OTHER DAY    carvediloL (COREG) tablet 12.5 mg  12.5 mg Oral BID WITH MEALS    cholecalciferol (VITAMIN D3) (1000 Units /25 mcg) tablet 4 Tab  4,000 Units Oral DAILY    [Held by provider] gabapentin (NEURONTIN) capsule 100 mg  100 mg Oral TID    latanoprost (XALATAN) 0.005 % ophthalmic solution 1 Drop  1 Drop Both Eyes QHS    levothyroxine (SYNTHROID) tablet 125 mcg  125 mcg Oral ACB    B complex-vitaminC-folic acid (NEPHROCAP) cap  1 Cap Oral DAILY    fluticasone propionate (FLONASE) 50 mcg/actuation nasal spray 2 Spray  2 Spray Both Nostrils DAILY    heparin (porcine) injection 5,000 Units  5,000 Units SubCUTAneous Q8H    0.9% sodium chloride infusion  100 mL/hr IntraVENous CONTINUOUS    pantoprazole (PROTONIX) tablet 40 mg  40 mg Oral ACB    sodium bicarbonate tablet 1,300 mg  1,300 mg Oral TID       Assessment/Plan   66 y. o. female with PMH CKD Stage 5 on procrit, hx of recurrent MDR UTIs/stones s/p R nephrostomy tube (06/20), HTN, DM II w/ neuropathy, anemia, galucoma, GERD, CTS, OA in back and knees now admitted with complicated UTI     Complicated UTI/Hx of recurrent UTI/nephrolithiasis w/MDR s/p R Nephrostomy tube placement 2019/CKD Stage 5  2 month hx of worsening suprapubic discomfort with dysuria, incontinence and straining. Per Dr. Ynes Méndez o/p note on 2/10/20: patient was seeing Urology in [place], [place] and has a hx of recurrent kidney stones (uric acid)/sepsis 2/2 UTI and underwent stent placement for management of her kidney stones and eventually developed strictures and severe hydronephrosis for which she had to have R nephrostomy tube put in 2019. Also has had a hx of recurrent UTIs that required extensive Abx course/hospitizations in the past, and has documented hx of developing C.  Diff infection. She also has L arm brachiocephalic fistula that is ready to use per her Vascular doctor. Per Dr. Suze Martinez note - patient on lasix 40mg PRN, Coreg 12.5mg qday, Norvasc 5mg qday, NaHCO3 for chronic metabolic acidosis and Calcitrol for secondary hyperPTH  Of note, patient underwent IR guided R Nephrostomy tube exchange on 1/28/20 per Dr. Lydia Moyer notes  Nephrology consulted from PF office and will be following the patient in the AM.              Poor urine output, pt reports chronic retention with incontinence  - Consulted urology for urinary retention and hematuria appreciate recs  - bladder checks per urology  - UA with gross pyuria  - Admit to medical floor  - Abx coverage with Rocephin 1g every 24 hours per Dr. Mei Cooper/Dr. Estrella Meng  - Nephrology on board; f/u recs   - F/U UCx, mixed mike right now but pt improving on abx, appreciate ID recs  - Can consider Urology consult in the AM (although CT pelvis did not show any obstruction or hydronephrosis)  - Continue Probiotics per ID  - VS per unit routine  - Daily daily CBC/BMP  - PT/OT/CM     JADON on CKD Stage 5 likely 2/2 Dehydration   Cr 4.27 upon admission; baseline 3.13 per [facility] notes and lab review. Patient reported ongoing nausea for which she was taking Zofran and meclizine in the past couple months and had poor po intake. Oral mucosa dry, did not appear volume overloaded on p/e. - Start patient on manitainence fluids - NS 100cc/hr  - Daily BMPs, avoid nephrotoxic drugs     Upper Respiratory Infection likely 2/2 viral etiology  3 d hx of rhinorrhea, nasal congestion but no fever, cough or myalgias  - Flonase to help with congestion  - CXR showing \"Question right lung pulmonary nodular densities\"     Anemia of Chronic Disease likely 2/2 to her CKD Stage 5  HgB 10.0 on admission, patient on Procrit but unclear on dose/frequency. Will defer starting patient back on Procrit to Nephrology.  Hgb 10.0>8.5>8.1 this AM, no CP, stable SOB, hematurea. Iron, iron saturation and TIBC low normal, Ferritin WNL.  Likely multifactorial anemia  - Monitor daily CBC      Chronic Metabolic Acidosis likely 2/2 to her CKD Stage 5  - Continue on home NaHCO2 650mg two tablets TID     Chronic HTN  BP on admission 111/62  - Continue home Amlodipine 5mg and Coreg 12.5mg BID   - Monitor via VS per unit routine     Hx of DM II w/ neuropathy  Last HbA1C 4.9; takes Gabapentin at home 100mg TID   - Continue to monitor BG thorough daily BMPs   - Hold off on Gabapentin given her worsening kidney function and JADON; can resume if okay with Nephrology     GERD - chronic hx  On Omeprazole at home  - Protonix 40mg qday while admitted     Hypothyroidism - chronic   - Continue home Synthroid 125 mcg po qhs     Diet Renal Diet    DVT Prophylaxis SQH   GI Prophylaxis Protonix   Code status DNR/DNI   Disposition Medical Floor       Point of Contact [place]  Relationship:  Anum Owusu MD   [address] Intern  02/22/20 11:02 AM

## 2020-02-22 NOTE — ROUTINE PROCESS
Bedside and Verbal shift change report given to JOSE ANTONIO Green (oncoming nurse) by Jairon Palmer (offgoing nurse). Report included the following information SBAR, Kardex, Intake/Output, MAR and Recent Results.

## 2020-02-23 LAB
ALBUMIN SERPL-MCNC: 2.7 G/DL (ref 3.4–5)
ALBUMIN/GLOB SERPL: 0.7 {RATIO} (ref 0.8–1.7)
ALP SERPL-CCNC: 113 U/L (ref 45–117)
ALT SERPL-CCNC: 8 U/L (ref 13–56)
ANION GAP SERPL CALC-SCNC: 7 MMOL/L (ref 3–18)
AST SERPL-CCNC: 16 U/L (ref 10–38)
BASOPHILS # BLD: 0 K/UL (ref 0–0.1)
BASOPHILS NFR BLD: 0 % (ref 0–2)
BILIRUB SERPL-MCNC: 1.6 MG/DL (ref 0.2–1)
BUN SERPL-MCNC: 33 MG/DL (ref 7–18)
BUN/CREAT SERPL: 10 (ref 12–20)
CALCIUM SERPL-MCNC: 7.9 MG/DL (ref 8.5–10.1)
CHLORIDE SERPL-SCNC: 115 MMOL/L (ref 100–111)
CO2 SERPL-SCNC: 22 MMOL/L (ref 21–32)
CREAT SERPL-MCNC: 3.25 MG/DL (ref 0.6–1.3)
DIFFERENTIAL METHOD BLD: ABNORMAL
EOSINOPHIL # BLD: 0.2 K/UL (ref 0–0.4)
EOSINOPHIL NFR BLD: 3 % (ref 0–5)
ERYTHROCYTE [DISTWIDTH] IN BLOOD BY AUTOMATED COUNT: 16.2 % (ref 11.6–14.5)
GLOBULIN SER CALC-MCNC: 4 G/DL (ref 2–4)
GLUCOSE SERPL-MCNC: 92 MG/DL (ref 74–99)
HCT VFR BLD AUTO: 27.1 % (ref 35–45)
HGB BLD-MCNC: 8.4 G/DL (ref 12–16)
LYMPHOCYTES # BLD: 2.2 K/UL (ref 0.9–3.6)
LYMPHOCYTES NFR BLD: 46 % (ref 21–52)
MCH RBC QN AUTO: 30.5 PG (ref 24–34)
MCHC RBC AUTO-ENTMCNC: 31 G/DL (ref 31–37)
MCV RBC AUTO: 98.5 FL (ref 74–97)
MONOCYTES # BLD: 0.4 K/UL (ref 0.05–1.2)
MONOCYTES NFR BLD: 8 % (ref 3–10)
NEUTS SEG # BLD: 2.1 K/UL (ref 1.8–8)
NEUTS SEG NFR BLD: 43 % (ref 40–73)
PLATELET # BLD AUTO: 175 K/UL (ref 135–420)
PMV BLD AUTO: 11.1 FL (ref 9.2–11.8)
POTASSIUM SERPL-SCNC: 4.3 MMOL/L (ref 3.5–5.5)
PROT SERPL-MCNC: 6.7 G/DL (ref 6.4–8.2)
RBC # BLD AUTO: 2.75 M/UL (ref 4.2–5.3)
SODIUM SERPL-SCNC: 144 MMOL/L (ref 136–145)
WBC # BLD AUTO: 4.9 K/UL (ref 4.6–13.2)

## 2020-02-23 PROCEDURE — 74011250637 HC RX REV CODE- 250/637: Performed by: STUDENT IN AN ORGANIZED HEALTH CARE EDUCATION/TRAINING PROGRAM

## 2020-02-23 PROCEDURE — 74011250636 HC RX REV CODE- 250/636: Performed by: STUDENT IN AN ORGANIZED HEALTH CARE EDUCATION/TRAINING PROGRAM

## 2020-02-23 PROCEDURE — 36415 COLL VENOUS BLD VENIPUNCTURE: CPT

## 2020-02-23 PROCEDURE — 80053 COMPREHEN METABOLIC PANEL: CPT

## 2020-02-23 PROCEDURE — 97161 PT EVAL LOW COMPLEX 20 MIN: CPT

## 2020-02-23 PROCEDURE — 85025 COMPLETE CBC W/AUTO DIFF WBC: CPT

## 2020-02-23 PROCEDURE — 51798 US URINE CAPACITY MEASURE: CPT

## 2020-02-23 PROCEDURE — 74011250637 HC RX REV CODE- 250/637: Performed by: NURSE PRACTITIONER

## 2020-02-23 PROCEDURE — 74011250636 HC RX REV CODE- 250/636: Performed by: INTERNAL MEDICINE

## 2020-02-23 PROCEDURE — 74011000250 HC RX REV CODE- 250: Performed by: STUDENT IN AN ORGANIZED HEALTH CARE EDUCATION/TRAINING PROGRAM

## 2020-02-23 PROCEDURE — 97165 OT EVAL LOW COMPLEX 30 MIN: CPT

## 2020-02-23 PROCEDURE — 65270000029 HC RM PRIVATE

## 2020-02-23 RX ADMIN — SODIUM CHLORIDE 100 ML/HR: 900 INJECTION, SOLUTION INTRAVENOUS at 04:30

## 2020-02-23 RX ADMIN — Medication 1 CAPSULE: at 09:18

## 2020-02-23 RX ADMIN — LEVOTHYROXINE SODIUM 125 MCG: 0.1 TABLET ORAL at 06:05

## 2020-02-23 RX ADMIN — Medication 500 MG: at 09:18

## 2020-02-23 RX ADMIN — AMLODIPINE BESYLATE 5 MG: 5 TABLET ORAL at 09:18

## 2020-02-23 RX ADMIN — SODIUM BICARBONATE 650 MG TABLET 1300 MG: at 09:18

## 2020-02-23 RX ADMIN — VITAMIN D, TAB 1000IU (100/BT) 4 TABLET: 25 TAB at 09:18

## 2020-02-23 RX ADMIN — TAMSULOSIN HYDROCHLORIDE 0.4 MG: 0.4 CAPSULE ORAL at 09:18

## 2020-02-23 RX ADMIN — NEPHROCAP 1 CAPSULE: 1 CAP ORAL at 09:18

## 2020-02-23 RX ADMIN — LATANOPROST 1 DROP: 50 SOLUTION OPHTHALMIC at 21:58

## 2020-02-23 RX ADMIN — CYCLOBENZAPRINE 5 MG: 10 TABLET, FILM COATED ORAL at 09:18

## 2020-02-23 RX ADMIN — HEPARIN SODIUM 5000 UNITS: 5000 INJECTION INTRAVENOUS; SUBCUTANEOUS at 23:53

## 2020-02-23 RX ADMIN — ACETAMINOPHEN 650 MG: 325 TABLET ORAL at 17:39

## 2020-02-23 RX ADMIN — EPOETIN ALFA-EPBX 4000 UNITS: 4000 INJECTION, SOLUTION INTRAVENOUS; SUBCUTANEOUS at 12:39

## 2020-02-23 RX ADMIN — FLUTICASONE PROPIONATE 2 SPRAY: 50 SPRAY, METERED NASAL at 09:19

## 2020-02-23 RX ADMIN — CEFTRIAXONE SODIUM 1 G: 1 INJECTION, POWDER, FOR SOLUTION INTRAMUSCULAR; INTRAVENOUS at 17:39

## 2020-02-23 RX ADMIN — HEPARIN SODIUM 5000 UNITS: 5000 INJECTION INTRAVENOUS; SUBCUTANEOUS at 17:39

## 2020-02-23 RX ADMIN — CARVEDILOL 12.5 MG: 12.5 TABLET, FILM COATED ORAL at 09:18

## 2020-02-23 RX ADMIN — SODIUM BICARBONATE 650 MG TABLET 1300 MG: at 17:40

## 2020-02-23 RX ADMIN — CARVEDILOL 12.5 MG: 12.5 TABLET, FILM COATED ORAL at 17:40

## 2020-02-23 RX ADMIN — PANTOPRAZOLE SODIUM 40 MG: 40 TABLET, DELAYED RELEASE ORAL at 06:05

## 2020-02-23 RX ADMIN — ACETAMINOPHEN 650 MG: 325 TABLET ORAL at 09:18

## 2020-02-23 RX ADMIN — MELATONIN TAB 3 MG 3 MG: 3 TAB at 21:57

## 2020-02-23 RX ADMIN — SODIUM BICARBONATE 650 MG TABLET 1300 MG: at 21:57

## 2020-02-23 NOTE — PROGRESS NOTES
OCCUPATIONAL THERAPY EVALUATION/DISCHARGE    Patient: Dasia Salcido [facility]66 y.o. female)  Date: 2/23/2020  Primary Diagnosis: Pyelonephritis [T98]  Complicated urinary tract infection [N39.0]        Precautions:   Fall  PLOF: Patient reported she was modified independent in basic self care tasks and functional mobility PTA, with cane/RW use. ASSESSMENT AND RECOMMENDATIONS:  Based on the objective data described below, the patient is modified independent with functional mobility and self care tasks with a RW. Patient is able to naga/doff socks seated in chair with modified independence, figure 4 technique. Patient was modified independent with simulated toilet transfer/RW. Educated patient on activity pacing, fall prevention, and safety. Skilled acute care occupational therapy is not indicated at this time. Will sign off. Discharge Recommendations: None  Further Equipment Recommendations for Discharge: tub transfer bench      SUBJECTIVE:   Patient stated I have been getting up.     OBJECTIVE DATA SUMMARY:   No past medical history on file.   Past Surgical History:   Procedure Laterality Date    IR EXCHANGE NEPHRO PERC LT SI  2/21/2020     Barriers to Learning/Limitations: None    Home Situation:   Home Situation  Home Environment: Private residence  # Steps to Enter: 3  One/Two Story Residence: Two story  Living Alone: No  Support Systems: Family member(s), Child(isaura)  Patient Expects to be Discharged to[de-identified] Private residence  Current DME Used/Available at Home: Alveta Gallery, rolling, Grab bars, Cane, straight  Tub or Shower Type: Tub/Shower combination  [x]     Right hand dominant   []     Left hand dominant    Cognitive/Behavioral Status:  Neurologic State: Alert  Orientation Level: Oriented X4  Cognition: Follows commands    Skin: No skin changes noted    Edema: No edema noted    Vision/Perceptual:       Acuity: Within Defined Limits      Coordination: BUE  Coordination: Within functional limits       Balance:  Sitting: Intact  Standing: Intact; With support    Strength: BUE  Strength: Generally decreased, functional      Tone & Sensation: BUE  Tone: Normal  Sensation: Intact      Range of Motion: BUE  AROM: Generally decreased, functional(limited shoulder flexion (PLOF) but functional)     Functional Mobility and Transfers for ADLs:  Bed Mobility:   Patient in chair upon arrival  Scooting: Modified independent  Transfers:  Sit to Stand: Modified independent  Stand to Sit: Modified independent   Toilet Transfer : Modified independent(with RW)      ADL Assessment:(clinical judgement)  Feeding: Independent    Oral Facial Hygiene/Grooming: Modified Independent    Bathing: Modified independent    Upper Body Dressing: Modified independent    Lower Body Dressing: Modified independent    Toileting: Modified independent      Pain:  Pain level pre-treatment: 0/10   Pain level post-treatment: 0/10     Activity Tolerance:   Good    Please refer to the flowsheet for vital signs taken during this treatment. After treatment:   [x]  Patient left in no apparent distress sitting up in chair  []  Patient left in no apparent distress in bed  [x]  Call bell left within reach  [x]  Nursing present  []  Caregiver present  []  Bed alarm activated    COMMUNICATION/EDUCATION:   [x]      Role of Occupational Therapy in the acute care setting  [x]      Home safety education was provided and the patient/caregiver indicated understanding. [x]      Patient/family have participated as able and agree with findings and recommendations. []      Patient is unable to participate in plan of care at this time. Thank you for this referral.  Viki Coronado OTR/MELANIE  Time Calculation: 15 mins      Eval Complexity: History: LOW Complexity : Brief history review ; Examination: LOW Complexity : 1-3 performance deficits relating to physical, cognitive , or psychosocial skils that result in activity limitations and / or participation restrictions ;    Decision Making:LOW Complexity : No comorbidities that affect functional and no verbal or physical assistance needed to complete eval tasks

## 2020-02-23 NOTE — ROUTINE PROCESS
Assumed care of patient. Bedside verbal report received from [facility] Makenzie Paul RN including SBAR, MAR, and Kardex. Vitals within normal limits. Assessment completed, patient in no apparent distress.

## 2020-02-23 NOTE — ROUTINE PROCESS
Report given to [facility], RN, including SBAR, MAR, and Kardex. Patient alert and oriented, vitals within normal limits, no apparent distress.

## 2020-02-23 NOTE — ROUTINE PROCESS
Bedside and Verbal shift change report given to Skylar Villeda RN (oncoming nurse) by Arun Chapman (offgoing nurse). Report included the following information SBAR, Kardex and MAR.

## 2020-02-23 NOTE — PROGRESS NOTES
[facility]  Intern Progress Note    Patient: Leana Cosby MRN: [id number]   SSN: [id number]  YOB: 1945   Age: 66 y.o. Sex: female      Admit Date: 2/20/2020    LOS: 3 days   Chief Complaint   Patient presents with    Urinary Pain    Blood in Urine       Subjective:     No acute events overnight. Patient states she feels better. No diarrhea. Does complain of some left ear pain that she attributes to a head cold coming on. ROS:   Denies:   - fevers/chill  - headache  - CP  - SOB  - N/V/diarrhea or abdominal pain     Objective:     PE:  - General: patient is in no acute distress  - Cv: RRR, no murmurs/gallops/rubs, peripheral pulses intact  - Resp: Lungs CTA   - Abdominal: Soft, non-tender, non-distended  - MSK: 1+ swelling in LE    Vitals:   Patient Vitals for the past 24 hrs:   Temp Pulse Resp BP SpO2   02/23/20 0600 97.9 °F (36.6 °C) 86 16 121/64 96 %   02/23/20 0215 97.9 °F (36.6 °C) 90 18 138/68 97 %   02/22/20 2119 98.6 °F (37 °C) 82 16 133/64 100 %   02/22/20 1745 98 °F (36.7 °C) (!) 102 16 120/68 100 %   02/22/20 1423 98.7 °F (37.1 °C) 90 15 122/66 99 %   02/22/20 1112 97.7 °F (36.5 °C) 83 16 111/60 97 %         Intake/Output Summary (Last 24 hours) at 2/23/2020 0931  Last data filed at 2/23/2020 8109  Gross per 24 hour   Intake 720 ml   Output 800 ml   Net -80 ml       Labs reviewed. Pertinent labs: WBC 4.9, Cr 3.25    Assessment/Plan:     66 y. o. female with PMH CKD Stage 5 on procrit, hx of recurrent MDR UTIs/stones s/p R nephrostomy tube (06/20), HTN, DM II w/ neuropathy, anemia, galucoma, GERD, CTS, OA in back and knees now admitted with complicated UTI     Complicated UTI vs pyelonephritis/Hx of recurrent UTI/nephrolithiasis w/MDR s/p R Nephrostomy tube placement 2019/CKD Stage 5, urinary incontinence. Patient had nephrostomy tubes replaced 2 days ago.  Has AV fistula in place, nephro following and recommending no need for emergent dialysis  - FU urology, nephro, ID recs  - bladder scans q4h  - Continue Rocephin  - F/U UCx, mixed mike right now but pt improving on abx  - Continue Probiotics per ID      JADON on CKD Stage 5, chronic metabolic acidosis - resolving  Cr 4.27 upon admission; baseline 3.13. Cr trending down   - Continue /hr  - Daily BMPs, avoid nephrotoxic drugs, renally dose meds  - Continue on home NaHCO2 650mg two tablets TID     Upper Respiratory Infection likely 2/2 viral etiology  3 d hx of rhinorrhea, nasal congestion but no fever, cough or myalgias. CXR showing \"Question right lung  pulmonary nodular densities\" Patient denies any fever/chills/SOB- does endorse URI sxs with ear pain today. - Flonase to help with congestion  - Tylenol PRN    Anemia of Chronic Disease likely 2/2 to her CKD Stage 5  HgB 10.0 on admission, patient on Procrit but unclear on dose/frequency. Will defer starting patient back on Procrit to Nephrology. Hgb stable this AM, no active sources of bleeding. Iron, iron saturation and TIBC low normal, Ferritin WNL.  Likely multifactorial anemia   - Monitor daily CBC        Chronic HTN- controlled  - Continue home Amlodipine 5mg and Coreg 12.5mg BID   - Monitor via VS per unit routine     Hx of DM II w/ neuropathy  Last HbA1C 4.9; takes Gabapentin at home 100mg TID   - Continue to monitor BG thorough daily BMPs   - Hold off on Gabapentin given her worsening kidney function and JADON; can resume if okay with Nephrology     GERD - chronic hx  On Omeprazole at home  - Protonix 40mg qday while admitted     Hypothyroidism - chronic   - Continue home Synthroid 125 mcg po qhs     Diet Renal Diet    DVT Prophylaxis SQH   GI Prophylaxis Protonix   Code status DNR/DNI   Disposition Medical Floor       Point of Contact [place]  Relationship:  [phone]         Signed By: Wade Morillo MD     February 23, 2020

## 2020-02-23 NOTE — PROGRESS NOTES
Problem: Pressure Injury - Risk of  Goal: *Prevention of pressure injury  Description  Document Giovany Scale and appropriate interventions in the flowsheet. Outcome: Progressing Towards Goal  Note: Pressure Injury Interventions:       Moisture Interventions: Absorbent underpads    Activity Interventions: Increase time out of bed    Mobility Interventions: PT/OT evaluation    Nutrition Interventions: Document food/fluid/supplement intake    Friction and Shear Interventions: Lift sheet                Problem: Patient Education: Go to Patient Education Activity  Goal: Patient/Family Education  Outcome: Progressing Towards Goal     Problem: Falls - Risk of  Goal: *Absence of Falls  Description  Document Grant Sandoval Fall Risk and appropriate interventions in the flowsheet.   Outcome: Progressing Towards Goal  Note: Fall Risk Interventions:  Mobility Interventions: Communicate number of staff needed for ambulation/transfer         Medication Interventions: Patient to call before getting OOB    Elimination Interventions: Call light in reach, Toilet paper/wipes in reach, Stay With Me (per policy), Toileting schedule/hourly rounds              Problem: Patient Education: Go to Patient Education Activity  Goal: Patient/Family Education  Outcome: Progressing Towards Goal     Problem: Pain  Goal: *Control of Pain  Outcome: Progressing Towards Goal     Problem: Patient Education: Go to Patient Education Activity  Goal: Patient/Family Education  Outcome: Progressing Towards Goal     Problem: Urinary Tract Infection - Adult  Goal: *Absence of infection signs and symptoms  Outcome: Progressing Towards Goal     Problem: Patient Education: Go to Patient Education Activity  Goal: Patient/Family Education  Outcome: Progressing Towards Goal     Problem: Nutrition Deficit  Goal: *Optimize nutritional status  Outcome: Progressing Towards Goal

## 2020-02-23 NOTE — PROGRESS NOTES
Progress Note    15-year-old female with a past medical history of hypertension, diabetes, CKD, history of recurrent nephrolithiasis,  History of right nephrostomy tube, history of recurrent UTI, admitted for sepsis UTI following for chronic kidney disease. Subjective      No fever    IMPRESSION:   Chronic kidney disease stage V, from multiple JADON as well as diabetes and hypertension she has left arm brachiocephalic fistula,No urgent or emergent indications during this hospitalization to start dialysis. Plan to start her on dialysis outpatient. Anemia  Sepsis, complicated UTI  History of recurrent nephrolithiasis  Hypertension  Secondary hyperparathyroid, on calcitriol   PLAN:   Her duplex vascular study shows well matured fistula on preliminary report. Start her on Epogen 4000 unit while she is in the hospital.  We will arrange epogen outpatient on discharge. No new recommendation from renal standpoint, following peripherally available if any question or concern. Continue antibiotic per primary team.  Adjust medication per ESRD status.      Discussed with primary team.              Facility-Administered Medications: None       Current Facility-Administered Medications   Medication Dose Route Frequency    melatonin tablet 3 mg  3 mg Oral QHS    cyclobenzaprine (FLEXERIL) tablet 5 mg  5 mg Oral DAILY    tamsulosin (FLOMAX) capsule 0.4 mg  0.4 mg Oral DAILY    lactobacillus sp. 50 billion cpu (BIO-K PLUS) capsule 1 Cap  1 Cap Oral DAILY    cefTRIAXone (ROCEPHIN) 1 g in sterile water (preservative free) 10 mL IV syringe  1 g IntraVENous Q24H    acetaminophen (TYLENOL) tablet 650 mg  650 mg Oral BID    [Held by provider] allopurinoL (ZYLOPRIM) tablet 200 mg  200 mg Oral DAILY    amLODIPine (NORVASC) tablet 5 mg  5 mg Oral DAILY    ascorbic acid (vitamin C) (VITAMIN C) tablet 500 mg  500 mg Oral DAILY    calcitRIOL (ROCALTROL) capsule 0.25 mcg  0.25 mcg Oral EVERY OTHER DAY    carvediloL (COREG) tablet 12.5 mg  12.5 mg Oral BID WITH MEALS    cholecalciferol (VITAMIN D3) (1000 Units /25 mcg) tablet 4 Tab  4,000 Units Oral DAILY    [Held by provider] gabapentin (NEURONTIN) capsule 100 mg  100 mg Oral TID    latanoprost (XALATAN) 0.005 % ophthalmic solution 1 Drop  1 Drop Both Eyes QHS    levothyroxine (SYNTHROID) tablet 125 mcg  125 mcg Oral ACB    ondansetron (ZOFRAN ODT) tablet 4 mg  4 mg Oral Q8H PRN    B complex-vitaminC-folic acid (NEPHROCAP) cap  1 Cap Oral DAILY    fluticasone propionate (FLONASE) 50 mcg/actuation nasal spray 2 Spray  2 Spray Both Nostrils DAILY    heparin (porcine) injection 5,000 Units  5,000 Units SubCUTAneous Q8H    0.9% sodium chloride infusion  100 mL/hr IntraVENous CONTINUOUS    pantoprazole (PROTONIX) tablet 40 mg  40 mg Oral ACB    sodium bicarbonate tablet 1,300 mg  1,300 mg Oral TID       Review of Systems:      Complete 10-point review of systems were obtained and discussed in length  with the patient. Complete review of systems was negative/unremarkable  except as mentioned in HPI section. Data Review:    Labs: Results:       Chemistry Recent Labs     02/23/20  0550 02/22/20  0406 02/21/20  1623 02/21/20  0507   GLU 92 98  --  103*    145  --  143   K 4.3 4.5  --  4.8   * 114*  --  112*   CO2 22 25  --  26   BUN 33* 37*  --  43*   CREA 3.25* 3.60*  --  4.06*   CA 7.9* 7.7* 8.3* 8.0*   AGAP 7 6  --  5   BUCR 10* 10*  --  11*    106  --  114   TP 6.7 6.2*  --  6.6   ALB 2.7* 2.5*  --  2.7*   GLOB 4.0 3.7  --  3.9   AGRAT 0.7* 0.7*  --  0.7*      CBC w/Diff Recent Labs     02/23/20  0550 02/22/20  0406 02/21/20  0507   WBC 4.9 5.2 6.0   RBC 2.75* 2.63* 2.79*   HGB 8.4* 8.1* 8.5*   HCT 27.1* 25.8* 27.4*    141 179   GRANS 43 42 34*   LYMPH 46 46 53*   EOS 3 3 3      Coagulation No results for input(s): PTP, INR, APTT, INREXT, INREXT in the last 72 hours.     Iron/Ferritin Recent Labs     02/21/20  1623   IRON 57      BNP No results for input(s): BNPP in the last 72 hours. Cardiac Enzymes No results for input(s): CPK, CKND1, PATTI in the last 72 hours. No lab exists for component: CKRMB, TROIP   Liver Enzymes Recent Labs     02/23/20  0550   TP 6.7   ALB 2.7*      SGOT 16      Thyroid Studies No results found for: T4, T3U, TSH, TSHEXT, TSHEXT      EKG:    Physical Assessment:     Visit Vitals  /64 (BP 1 Location: Right arm, BP Patient Position: At rest)   Pulse 86   Temp 97.9 °F (36.6 °C)   Resp 16   Ht 5' 2\" (1.575 m)   Wt 109.3 kg (241 lb)   SpO2 96%   BMI 44.08 kg/m²     Weight change:     Intake/Output Summary (Last 24 hours) at 2/23/2020 1043  Last data filed at 2/23/2020 8660  Gross per 24 hour   Intake 480 ml   Output 800 ml   Net -320 ml     Physical Exam:   General: comfortable, no acute distress   HEENT sclera anicteric, supple neck,  CVS: S1S2 heard,  no rub  RS: + air entry b/l,   Abd: Soft, Non tender, Not distended, right side nephrostomy tube.    Neuro: non focal, awake, alert , CN II-XII are grossly intact  Extrm: + edema, no cyanosis, clubbing   Skin: no visible  Rash  Musculoskeletal: No gross joints or bone deformities     Procedures/imaging: see electronic medical records for all procedures, Xrays and details which were not copied into this note but were reviewed prior to creation of Stiven Culver MD  February 23, 2020  [facility]  Office [phone]

## 2020-02-23 NOTE — PROGRESS NOTES
Problem: Mobility Impaired (Adult and Pediatric)  Goal: *Acute Goals and Plan of Care (Insert Text)  Outcome: Resolved/Met    PHYSICAL THERAPY EVALUATION AND DISCHARGE    Patient: Jagdish Simms (75 y.o. female)  Date: 2/23/2020  Primary Diagnosis: Pyelonephritis [L60]  Complicated urinary tract infection [N39.0]        Precautions:   Fall    PLOF: pt lives with son in process of moving from [place]. Pt lives with son in 2 story house with 3 steps to enter, is able to ascend full flight with increased time and cane. Has walker but only uses it outside. Is independent with all mobility and ADLs. ASSESSMENT :  Based on the objective data described below, the patient presents with baseline functional mobility. Pt complete dynamic sitting and standing with Reyna, ambulating throughout room x30 feet with RW and Reyna. Pt reports she is getting up to the bathroom with IV pole for support independently in the room and transitioning to and from bedside recliner independently. Pt left with all needs met and call bell in reach in recliner chair    Patient does not require further skilled intervention at this level of care. PLAN :  Recommendations and Planned Interventions:   No formal PT needs identified at this time. Discharge Recommendations: Home Health  Further Equipment Recommendations for Discharge: rolling walker     SUBJECTIVE:   Patient stated Iraida Preciado can move around fine, Do you need me to show you? Ning Lipscomb    OBJECTIVE DATA SUMMARY:   No past medical history on file.   Past Surgical History:   Procedure Laterality Date    IR EXCHANGE NEPHRO PERC LT SI  2/21/2020     Barriers to Learning/Limitations: None  Compensate with: N/A  Home Situation:   Home Situation  Home Environment: Private residence  # Steps to Enter: 3  One/Two Story Residence: Two story  Living Alone: No  Support Systems: Family member(s), Child(isaura)  Patient Expects to be Discharged to[de-identified] Private residence  Current DME Used/Available at Home: Alex Navarro rolling, Grab bars, Cane, straight  Tub or Shower Type: Tub/Shower combination  Critical Behavior:  Neurologic State: Alert  Orientation Level: Oriented X4  Cognition: Follows commands     Psychosocial  Patient Behaviors: Calm; Cooperative  Purposeful Interaction: Yes  Pt Identified Daily Priority: Clinical issues (comment)  Caritas Process: Establish trust  Caring Interventions: Reassure  Reassure: Therapeutic listening  Skin Condition/Temp: Warm;Dry     Skin Integrity: Incision (comment)  Skin Integumentary  Skin Color: Appropriate for ethnicity  Skin Condition/Temp: Warm;Dry  Skin Integrity: Incision (comment)  Turgor: Non-tenting  Hair Growth: Present  Varicosities: Absent     Strength:    Strength: Generally decreased, functional                    Tone & Sensation:   Tone: Normal              Sensation: Intact               Range Of Motion:  AROM: Within functional limits                       Posture:  Posture (WDL): Within defined limits      Functional Mobility:  Bed Mobility:           Scooting: Modified independent  Transfers:  Sit to Stand: Modified independent  Stand to Sit: Modified independent                       Balance:   Sitting: Intact  Standing: Intact; With support  Wheelchair Mobility:        Ambulation/Gait Training:              Gait Description (WDL): Within defined limits(may benefit from use of RW at home instead of cane)                                      Stairs:                Pain:  No pain reported throughout session    Activity Tolerance:   Good activity tolerance, no reports of fatigue during ambulation and mobility skills. Please refer to the flowsheet for vital signs taken during this treatment.   After treatment:   [x]         Patient left in no apparent distress sitting up in chair  []         Patient left in no apparent distress in bed  [x]         Call bell left within reach  []         Nursing notified  []         Caregiver present  []         Bed alarm activated  [] SCDs applied    COMMUNICATION/EDUCATION:   [x]         Role of Physical Therapy in the acute care setting. [x]         Fall prevention education was provided and the patient/caregiver indicated understanding. []         Patient/family have participated as able in goal setting and plan of care. []         Patient/family agree to work toward stated goals and plan of care. []         Patient understands intent and goals of therapy, but is neutral about his/her participation. []         Patient is unable to participate in goal setting/plan of care: ongoing with therapy staff.  []         Other:     Thank you for this referral.  Milind Pagan, PT   Time Calculation: 16 mins      Eval Complexity: History: MEDIUM  Complexity : 1-2 comorbidities / personal factors will impact the outcome/ POC Exam:MEDIUM Complexity : 3 Standardized tests and measures addressing body structure, function, activity limitation and / or participation in recreation  Presentation: LOW Complexity : Stable, uncomplicated  Clinical Decision Making:Low Complexity low  Overall Complexity:LOW

## 2020-02-24 VITALS
BODY MASS INDEX: 44.35 KG/M2 | OXYGEN SATURATION: 100 % | SYSTOLIC BLOOD PRESSURE: 120 MMHG | HEIGHT: 62 IN | HEART RATE: 91 BPM | DIASTOLIC BLOOD PRESSURE: 75 MMHG | TEMPERATURE: 97.5 F | WEIGHT: 241 LBS | RESPIRATION RATE: 16 BRPM

## 2020-02-24 LAB
ALBUMIN SERPL-MCNC: 2.7 G/DL (ref 3.4–5)
ALBUMIN/GLOB SERPL: 0.7 {RATIO} (ref 0.8–1.7)
ALP SERPL-CCNC: 115 U/L (ref 45–117)
ALT SERPL-CCNC: 9 U/L (ref 13–56)
ANION GAP SERPL CALC-SCNC: 7 MMOL/L (ref 3–18)
AST SERPL-CCNC: 17 U/L (ref 10–38)
BACTERIA SPEC CULT: ABNORMAL
BACTERIA SPEC CULT: ABNORMAL
BASOPHILS # BLD: 0 K/UL (ref 0–0.1)
BASOPHILS NFR BLD: 1 % (ref 0–2)
BILIRUB SERPL-MCNC: 0.5 MG/DL (ref 0.2–1)
BUN SERPL-MCNC: 29 MG/DL (ref 7–18)
BUN/CREAT SERPL: 10 (ref 12–20)
CALCIUM SERPL-MCNC: 8.1 MG/DL (ref 8.5–10.1)
CHLORIDE SERPL-SCNC: 115 MMOL/L (ref 100–111)
CO2 SERPL-SCNC: 22 MMOL/L (ref 21–32)
CREAT SERPL-MCNC: 3.03 MG/DL (ref 0.6–1.3)
DIFFERENTIAL METHOD BLD: ABNORMAL
EOSINOPHIL # BLD: 0.2 K/UL (ref 0–0.4)
EOSINOPHIL NFR BLD: 4 % (ref 0–5)
ERYTHROCYTE [DISTWIDTH] IN BLOOD BY AUTOMATED COUNT: 15.9 % (ref 11.6–14.5)
GLOBULIN SER CALC-MCNC: 4.1 G/DL (ref 2–4)
GLUCOSE SERPL-MCNC: 94 MG/DL (ref 74–99)
HCT VFR BLD AUTO: 27.5 % (ref 35–45)
HGB BLD-MCNC: 8.6 G/DL (ref 12–16)
LEFT ARTERIAL PROX ANASTOMOSIS AVF EDV: 144.5 CM/S
LEFT ARTERIAL PROX ANASTOMOSIS AVF PSV: 299 CM/S
LEFT AVF AVG DIAMETER 1: 0.97 CM
LEFT AVF AVG DIAMETER 2: 0.95 CM
LEFT AVF AVG DIAMETER 3: 0.8 CM
LEFT AVF AVG GRAFT NAME: NORMAL
LEFT AVG AVF DEPTH 1: 0.7 CM
LEFT AVG AVF DEPTH 2: 0.57 CM
LEFT AVG AVF DEPTH 3: 0.33 CM
LEFT DIST OUTFLOW AVF EDV: 57.7 CM/S
LEFT DIST OUTFLOW AVF PSV: 103 CM/S
LEFT INFLOW ARTERY AVF EDV: 132.9 CM/S
LEFT INFLOW ARTERY AVF PSV: 226.1 CM/S
LEFT MID OUTFLOW AVF EDV: 64.9 CM/S
LEFT MID OUTFLOW AVF PSV: 120 CM/S
LEFT PROX OUTFLOW AVF EDV: 170.4 CM/S
LEFT PROX OUTFLOW AVF PSV: 310 CM/S
LYMPHOCYTES # BLD: 2.5 K/UL (ref 0.9–3.6)
LYMPHOCYTES NFR BLD: 50 % (ref 21–52)
MCH RBC QN AUTO: 30.5 PG (ref 24–34)
MCHC RBC AUTO-ENTMCNC: 31.3 G/DL (ref 31–37)
MCV RBC AUTO: 97.5 FL (ref 74–97)
MONOCYTES # BLD: 0.4 K/UL (ref 0.05–1.2)
MONOCYTES NFR BLD: 8 % (ref 3–10)
NEUTS SEG # BLD: 1.8 K/UL (ref 1.8–8)
NEUTS SEG NFR BLD: 37 % (ref 40–73)
PHOSPHATE SERPL-MCNC: 3.1 MG/DL (ref 2.5–4.9)
PLATELET # BLD AUTO: 176 K/UL (ref 135–420)
PMV BLD AUTO: 11.1 FL (ref 9.2–11.8)
POTASSIUM SERPL-SCNC: 4.1 MMOL/L (ref 3.5–5.5)
PROT SERPL-MCNC: 6.8 G/DL (ref 6.4–8.2)
RBC # BLD AUTO: 2.82 M/UL (ref 4.2–5.3)
SERVICE CMNT-IMP: ABNORMAL
SODIUM SERPL-SCNC: 144 MMOL/L (ref 136–145)
WBC # BLD AUTO: 4.9 K/UL (ref 4.6–13.2)

## 2020-02-24 PROCEDURE — 85025 COMPLETE CBC W/AUTO DIFF WBC: CPT

## 2020-02-24 PROCEDURE — 51798 US URINE CAPACITY MEASURE: CPT

## 2020-02-24 PROCEDURE — 74011250636 HC RX REV CODE- 250/636: Performed by: STUDENT IN AN ORGANIZED HEALTH CARE EDUCATION/TRAINING PROGRAM

## 2020-02-24 PROCEDURE — 84100 ASSAY OF PHOSPHORUS: CPT

## 2020-02-24 PROCEDURE — 74011250637 HC RX REV CODE- 250/637: Performed by: NURSE PRACTITIONER

## 2020-02-24 PROCEDURE — 74011250637 HC RX REV CODE- 250/637: Performed by: STUDENT IN AN ORGANIZED HEALTH CARE EDUCATION/TRAINING PROGRAM

## 2020-02-24 PROCEDURE — 36415 COLL VENOUS BLD VENIPUNCTURE: CPT

## 2020-02-24 PROCEDURE — 80053 COMPREHEN METABOLIC PANEL: CPT

## 2020-02-24 PROCEDURE — 77030027138 HC INCENT SPIROMETER -A

## 2020-02-24 RX ORDER — CEPHALEXIN 250 MG/1
250 TABLET ORAL 2 TIMES DAILY
Qty: 8 TAB | Refills: 0 | Status: SHIPPED | OUTPATIENT
Start: 2020-02-24 | End: 2020-04-10

## 2020-02-24 RX ORDER — FLUTICASONE PROPIONATE 50 MCG
SPRAY, SUSPENSION (ML) NASAL
Qty: 1 BOTTLE | Refills: 1 | Status: SHIPPED | OUTPATIENT
Start: 2020-02-25 | End: 2020-07-15

## 2020-02-24 RX ORDER — TAMSULOSIN HYDROCHLORIDE 0.4 MG/1
0.4 CAPSULE ORAL DAILY
Qty: 90 CAP | Refills: 3 | Status: SHIPPED | OUTPATIENT
Start: 2020-02-25 | End: 2021-10-14

## 2020-02-24 RX ORDER — SODIUM BICARBONATE 650 MG/1
1300 TABLET ORAL 3 TIMES DAILY
Qty: 30 TAB | Refills: 0 | Status: SHIPPED | OUTPATIENT
Start: 2020-02-24 | End: 2020-02-24

## 2020-02-24 RX ORDER — SODIUM BICARBONATE 650 MG/1
1300 TABLET ORAL 3 TIMES DAILY
Qty: 30 TAB | Refills: 1 | Status: SHIPPED | OUTPATIENT
Start: 2020-02-24 | End: 2021-10-14

## 2020-02-24 RX ADMIN — TAMSULOSIN HYDROCHLORIDE 0.4 MG: 0.4 CAPSULE ORAL at 09:52

## 2020-02-24 RX ADMIN — NEPHROCAP 1 CAPSULE: 1 CAP ORAL at 09:51

## 2020-02-24 RX ADMIN — Medication 500 MG: at 09:53

## 2020-02-24 RX ADMIN — AMLODIPINE BESYLATE 5 MG: 5 TABLET ORAL at 09:52

## 2020-02-24 RX ADMIN — SODIUM BICARBONATE 650 MG TABLET 1300 MG: at 09:52

## 2020-02-24 RX ADMIN — PANTOPRAZOLE SODIUM 40 MG: 40 TABLET, DELAYED RELEASE ORAL at 07:00

## 2020-02-24 RX ADMIN — LEVOTHYROXINE SODIUM 125 MCG: 0.1 TABLET ORAL at 07:00

## 2020-02-24 RX ADMIN — HEPARIN SODIUM 5000 UNITS: 5000 INJECTION INTRAVENOUS; SUBCUTANEOUS at 06:03

## 2020-02-24 RX ADMIN — CARVEDILOL 12.5 MG: 12.5 TABLET, FILM COATED ORAL at 09:53

## 2020-02-24 RX ADMIN — VITAMIN D, TAB 1000IU (100/BT) 3 TABLET: 25 TAB at 09:51

## 2020-02-24 RX ADMIN — ACETAMINOPHEN 650 MG: 325 TABLET ORAL at 09:52

## 2020-02-24 RX ADMIN — CYCLOBENZAPRINE 5 MG: 10 TABLET, FILM COATED ORAL at 09:49

## 2020-02-24 RX ADMIN — Medication 1 CAPSULE: at 09:52

## 2020-02-24 RX ADMIN — SODIUM CHLORIDE 100 ML/HR: 900 INJECTION, SOLUTION INTRAVENOUS at 11:32

## 2020-02-24 RX ADMIN — SODIUM CHLORIDE 100 ML/HR: 900 INJECTION, SOLUTION INTRAVENOUS at 01:05

## 2020-02-24 NOTE — ROUTINE PROCESS
Bedside shift change report given to Nato Batres RN (oncoming nurse) by Raquel Trivedi RN (offgoing nurse). Report included the following information SBAR, Kardex, OR Summary, Procedure Summary, Intake/Output, MAR, Recent Results and Med Rec Status.

## 2020-02-24 NOTE — PROGRESS NOTES
D/C orders received. No needs identified by . Chart reviewed. Pt will be transported home by Son.  available as needed.     Eda Valdez RN BSN  Care Manager  [phone]

## 2020-02-24 NOTE — PROGRESS NOTES
RENAL DAILY PROGRESS NOTE    Subjective:   Admitted for for dysuria and hematuria seen for CKD stage 5    Complaint: feels good, no CP or SOB, no dysuria or gross hematuria. Wants to go home.     Current Facility-Administered Medications   Medication Dose Route Frequency    epoetin caitlin-epbx (RETACRIT) injection 4,000 Units  4,000 Units SubCUTAneous Q MON, WED & FRI    melatonin tablet 3 mg  3 mg Oral QHS    cyclobenzaprine (FLEXERIL) tablet 5 mg  5 mg Oral DAILY    tamsulosin (FLOMAX) capsule 0.4 mg  0.4 mg Oral DAILY    lactobacillus sp. 50 billion cpu (BIO-K PLUS) capsule 1 Cap  1 Cap Oral DAILY    cefTRIAXone (ROCEPHIN) 1 g in sterile water (preservative free) 10 mL IV syringe  1 g IntraVENous Q24H    acetaminophen (TYLENOL) tablet 650 mg  650 mg Oral BID    [Held by provider] allopurinoL (ZYLOPRIM) tablet 200 mg  200 mg Oral DAILY    amLODIPine (NORVASC) tablet 5 mg  5 mg Oral DAILY    ascorbic acid (vitamin C) (VITAMIN C) tablet 500 mg  500 mg Oral DAILY    calcitRIOL (ROCALTROL) capsule 0.25 mcg  0.25 mcg Oral EVERY OTHER DAY    carvediloL (COREG) tablet 12.5 mg  12.5 mg Oral BID WITH MEALS    cholecalciferol (VITAMIN D3) (1000 Units /25 mcg) tablet 4 Tab  4,000 Units Oral DAILY    [Held by provider] gabapentin (NEURONTIN) capsule 100 mg  100 mg Oral TID    latanoprost (XALATAN) 0.005 % ophthalmic solution 1 Drop  1 Drop Both Eyes QHS    levothyroxine (SYNTHROID) tablet 125 mcg  125 mcg Oral ACB    ondansetron (ZOFRAN ODT) tablet 4 mg  4 mg Oral Q8H PRN    B complex-vitaminC-folic acid (NEPHROCAP) cap  1 Cap Oral DAILY    fluticasone propionate (FLONASE) 50 mcg/actuation nasal spray 2 Spray  2 Spray Both Nostrils DAILY    heparin (porcine) injection 5,000 Units  5,000 Units SubCUTAneous Q8H    0.9% sodium chloride infusion  100 mL/hr IntraVENous CONTINUOUS    pantoprazole (PROTONIX) tablet 40 mg  40 mg Oral ACB    sodium bicarbonate tablet 1,300 mg  1,300 mg Oral TID Objective:     Patient Vitals for the past 24 hrs:   Temp Pulse Resp BP SpO2   02/24/20 0945 97.5 °F (36.4 °C) 91 16 120/75 100 %   02/24/20 0944 -- -- -- -- 100 %   02/24/20 0556 98.2 °F (36.8 °C) 87 18 117/62 97 %   02/23/20 2150 97.9 °F (36.6 °C) 87 18 104/62 95 %   02/23/20 1841 98.2 °F (36.8 °C) 93 16 111/54 100 %   02/23/20 1454 97.1 °F (36.2 °C) 85 16 106/59 100 %        Weight change:      02/22 1901 - 02/24 0700  In: 1800 [P.O.:600; I.V.:1200]  Out: 1400 [Urine:1400]    Intake/Output Summary (Last 24 hours) at 2/24/2020 1321  Last data filed at 2/24/2020 0659  Gross per 24 hour   Intake 1560 ml   Output 700 ml   Net 860 ml     Physical Exam: alert, oriented x 3 afebrile    HEENT: non icteric  Neck: No JVD  Cardiovascular: regular no rub  C/L: clear  Abdomen: soft + BS, non tender Right PCN  Ext: + LE edema, left upper arm avf + bruit    Data Review:     LABS:   Hematology:   Recent Labs     02/24/20  0515 02/23/20  0550 02/22/20  0406   WBC 4.9 4.9 5.2   HGB 8.6* 8.4* 8.1*   HCT 27.5* 27.1* 25.8*   pl 176K  Chemistry:   Recent Labs     02/24/20  0515 02/23/20  0550 02/22/20  0406 02/21/20  1623   BUN 29* 33* 37*  --    CREA 3.03* 3.25* 3.60*  --    CA 8.1* 7.9* 7.7* 8.3*   ALB 2.7* 2.7* 2.5*  --    K 4.1 4.3 4.5  --     144 145  --    * 115* 114*  --    CO2 22 22 25  --    PHOS 3.1  --   --   --    GLU 94 92 98  --           IMAGES: CT abd/pelvis w/o contrast 2/20/20202  Right kidney shows no dilation of the collecting system with nephrostomy in  place. Bilateral renal cysts. Alcohol the renal hypodensities characterized on this  noncontrast study; small solid nodule not excluded  Severe edema of the bladder and perivesical region. There is diverticulosis without diverticulitis    IR nephrostomy exchange 2/21/2020    CULTURE: 2/20/2020  Culture result:      Final   >100,000   COLONIES/mL   MIXED GRAM POSITIVE [place], PROBABLE SKIN/GENITAL CONTAMINATION.     Culture result: Abnormal Final   INCLUDING 21774 COLONIES/mL STAPHYLOCOCCUS AUREUS        IMPRESSION AND PLAN:   CKD stage 5, no acute indication to start HD. Will follow with Dr. Lulú Nascimento. Cont with sodium bicarb and low K and phos diet.    UTI, post right PCN exchange, defer to urology, ID  Anemia cont with SUNI as outpt  may benefit from IV venofer  SHPT cont calcitriol trend [address] [place] [address] MD Dima  2/24/2020

## 2020-02-24 NOTE — PROGRESS NOTES
Infectious Disease progress Note      Reason: cystitis, h/o c.diff    Current abx Prior abx   Ceftriaxone since 2/20/20      Lines:       Assessment :    66 y.o. 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 E.coli bloodstream infection in 3/2019 (pan susceptible e.coli)    Acinetobacter UTI in 4/2019 -  (intermediately susceptible to ceftriaxone, ceftazidime, pip/tazo)    Clinical presentation c/w early hemorrhagic cystitis     Clinically better. Resolved hematuria. H/o c.diff - hence, will attempt to treat patient with a short course of antibiotics    Right PCNL. No hydronephrosis noted on Ct scan 2/20/20. +nt edema of bladder c/w cystitis    S/P PCNL exchange on 2/21/20. Urology help appreciated. Patient has documented h/o ciprofloxacin allergy. She has tolerated ciprofloxacin on multiple occasions in the past. About a year ago, she had hives when she was given ciprofloxacin. Patient wishes to try ciprofloxacin again if needed. Recommendations:    1. D/c ceftriaxone. Start po cephalexin x 4 more days  2. Give probiotics while on abx  3. F/u urology recommendations  4. Ok to d/c patient from ID stdpt. Advance Care planning:DNR: discussed  with patient/surrogate decision maker - Coco mabry- [phone]    Above plan was discussed in details with patient, and dr. Gagandeep Yang. Please call me if any further questions or concerns. Will continue to participate in the care of this patient. HPI:        Patient denies headaches, visual disturbances, sore throat, runny nose, earaches, cp, sob, chills, cough, abdominal pain, diarrhea,  pain or weakness in extremities. has intermittent right flank pain for a long time - no recent worsening.         home Medication List Details   cyclobenzaprine (FLEXERIL) 5 mg tablet Take 5 mg by mouth daily. acetaminophen (TYLENOL) 325 mg tablet Take 650 mg by mouth two (2) times a day. allopurinoL (ZYLOPRIM) 100 mg tablet Take 200 mg by mouth daily. amLODIPine (NORVASC) 5 mg tablet Take 5 mg by mouth daily. ascorbic acid, vitamin C, (VITAMIN C) 500 mg tablet Take 500 mg by mouth.      calcitRIOL (ROCALTROL) 0.25 mcg capsule Take 0.25 mcg by mouth every other day. carvediloL (COREG) 12.5 mg tablet Take  by mouth two (2) times daily (with meals). cholecalciferol (VITAMIN D3) (2,000 UNITS /50 MCG) cap capsule Take 2,000 Units by mouth two (2) times a day. Take two tabs a total of 4000 units      gabapentin (NEURONTIN) 100 mg capsule Take 100 mg by mouth nightly. latanoprost (XALATAN) 0.005 % ophthalmic solution Administer 1 Drop to both eyes nightly. One drop at bedtime      levothyroxine (SYNTHROID) 125 mcg tablet Take 125 mcg by mouth Daily (before breakfast). omeprazole (PRILOSEC) 20 mg capsule Take 20 mg by mouth daily. ondansetron (ZOFRAN ODT) 4 mg disintegrating tablet Take 4 mg by mouth every eight (8) hours as needed for Nausea or Vomiting. B.infantis-B.ani-B.long-B.bifi (PROBIOTIC 4X) 10-15 mg TbEC Take  by mouth. vit B Cmplx 3-FA-Vit C-Biotin (NEPHRO HOWIE RX) 1- mg-mg-mcg tablet Take 1 Tab by mouth daily.           Current Facility-Administered Medications   Medication Dose Route Frequency    epoetin caitlin-epbx (RETACRIT) injection 4,000 Units  4,000 Units SubCUTAneous Q MON, WED & FRI    melatonin tablet 3 mg  3 mg Oral QHS    cyclobenzaprine (FLEXERIL) tablet 5 mg  5 mg Oral DAILY    tamsulosin (FLOMAX) capsule 0.4 mg  0.4 mg Oral DAILY    lactobacillus sp. 50 billion cpu (BIO-K PLUS) capsule 1 Cap  1 Cap Oral DAILY    cefTRIAXone (ROCEPHIN) 1 g in sterile water (preservative free) 10 mL IV syringe  1 g IntraVENous Q24H    acetaminophen (TYLENOL) tablet 650 mg  650 mg Oral BID    [Held by provider] allopurinoL (ZYLOPRIM) tablet 200 mg  200 mg Oral DAILY    amLODIPine (NORVASC) tablet 5 mg  5 mg Oral DAILY    ascorbic acid (vitamin C) (VITAMIN C) tablet 500 mg  500 mg Oral DAILY    calcitRIOL (ROCALTROL) capsule 0.25 mcg  0.25 mcg Oral EVERY OTHER DAY    carvediloL (COREG) tablet 12.5 mg  12.5 mg Oral BID WITH MEALS    cholecalciferol (VITAMIN D3) (1000 Units /25 mcg) tablet 4 Tab  4,000 Units Oral DAILY    [Held by provider] gabapentin (NEURONTIN) capsule 100 mg  100 mg Oral TID    latanoprost (XALATAN) 0.005 % ophthalmic solution 1 Drop  1 Drop Both Eyes QHS    levothyroxine (SYNTHROID) tablet 125 mcg  125 mcg Oral ACB    ondansetron (ZOFRAN ODT) tablet 4 mg  4 mg Oral Q8H PRN    B complex-vitaminC-folic acid (NEPHROCAP) cap  1 Cap Oral DAILY    fluticasone propionate (FLONASE) 50 mcg/actuation nasal spray 2 Spray  2 Spray Both Nostrils DAILY    heparin (porcine) injection 5,000 Units  5,000 Units SubCUTAneous Q8H    0.9% sodium chloride infusion  100 mL/hr IntraVENous CONTINUOUS    pantoprazole (PROTONIX) tablet 40 mg  40 mg Oral ACB    sodium bicarbonate tablet 1,300 mg  1,300 mg Oral TID       Allergies: Ciprofloxacin and Statins-hmg-coa reductase inhibitors    Temp (24hrs), Av.7 °F (36.5 °C), Min:97.1 °F (36.2 °C), Max:98.2 °F (36.8 °C)    Visit Vitals  /75 (BP 1 Location: Right arm, BP Patient Position: At rest)   Pulse 91   Temp 97.5 °F (36.4 °C)   Resp 16   Ht 5' 2\" (1.575 m)   Wt 109.3 kg (241 lb)   SpO2 100%   BMI 44.08 kg/m²       ROS: 12 point ROS obtained in details. Pertinent positives as mentioned in HPI,   otherwise negative    Physical Exam:    General: Well developed, well nourished female laying on the bed  AAOx3 in no acute distress.     General:   awake alert and oriented   HEENT:  Normocephalic, atraumatic, PERRL, EOMI, no scleral icterus or pallor; no conjunctival hemmohage;  nasal and oral mucous are moist and without evidence of lesions. No thrush. Neck supple, no bruits. Lymph Nodes:   no cervical, axillary or inguinal adenopathy   Lungs:   non-labored, bilaterally clear to auscultation- no crackles wheezes rales or rhonchi   Heart:  RRR, s1 and s2; no rubs or gallops, no edema, + pedal pulses   Abdomen:  soft, non-distended, active bowel sounds, no hepatomegaly, no splenomegaly. Minimal suprapubic tenderness   Genitourinary:  no chua   Extremities:   no clubbing, cyanosis; no joint effusions or swelling; Full ROM of all large joints to the upper and lower extremities; muscle mass appropriate for age   Neurologic:  No gross focal sensory abnormalities; 5/5 muscle strength to upper and lower extremities. Speech appropriate. Cranial nerves intact                        Skin:  No rash or ulcers noted   Back:  no spinal or paraspinal muscle tenderness or rigidity, minimal right CVA tenderness around the right PCNL tube, clear urine in PCNL     Psychiatric:  No suicidal or homicidal ideations, appropriate mood and affect         Labs: Results:   Chemistry Recent Labs     02/24/20  0515 02/23/20  0550 02/22/20  0406   GLU 94 92 98    144 145   K 4.1 4.3 4.5   * 115* 114*   CO2 22 22 25   BUN 29* 33* 37*   CREA 3.03* 3.25* 3.60*   CA 8.1* 7.9* 7.7*   AGAP 7 7 6   BUCR 10* 10* 10*    113 106   TP 6.8 6.7 6.2*   ALB 2.7* 2.7* 2.5*   GLOB 4.1* 4.0 3.7   AGRAT 0.7* 0.7* 0.7*      CBC w/Diff Recent Labs     02/24/20  0515 02/23/20  0550 02/22/20  0406   WBC 4.9 4.9 5.2   RBC 2.82* 2.75* 2.63*   HGB 8.6* 8.4* 8.1*   HCT 27.5* 27.1* 25.8*    175 141   GRANS 37* 43 42   LYMPH 50 46 46   EOS 4 3 3      Microbiology No results for input(s): CULT in the last 72 hours.        RADIOLOGY:    All available imaging studies/reports in [facility] for this admission were reviewed    Dr. Alice Rojo, Infectious Disease Specialist  [phone]  February 24, 2020  9:14 AM

## 2020-02-24 NOTE — PROGRESS NOTES
Problem: Pressure Injury - Risk of  Goal: *Prevention of pressure injury  Description  Document Giovany Scale and appropriate interventions in the flowsheet. Outcome: Progressing Towards Goal  Note: Pressure Injury Interventions:  Sensory Interventions: Assess changes in LOC, Keep linens dry and wrinkle-free, Maintain/enhance activity level, Minimize linen layers, Pad between skin to skin, Pressure redistribution bed/mattress (bed type)    Moisture Interventions: Absorbent underpads, Apply protective barrier, creams and emollients, Assess need for specialty bed, Check for incontinence Q2 hours and as needed, Limit adult briefs, Maintain skin hydration (lotion/cream), Minimize layers    Activity Interventions: Assess need for specialty bed, Chair cushion, Increase time out of bed, Pressure redistribution bed/mattress(bed type), PT/OT evaluation    Mobility Interventions: Chair cushion, HOB 30 degrees or less, Pressure redistribution bed/mattress (bed type)    Nutrition Interventions: Document food/fluid/supplement intake    Friction and Shear Interventions: HOB 30 degrees or less, Minimize layers                Problem: Patient Education: Go to Patient Education Activity  Goal: Patient/Family Education  Outcome: Progressing Towards Goal     Problem: Falls - Risk of  Goal: *Absence of Falls  Description  Document Freddy Fall Risk and appropriate interventions in the flowsheet.   Outcome: Progressing Towards Goal  Note: Fall Risk Interventions:  Mobility Interventions: Assess mobility with egress test, Patient to call before getting OOB, Strengthening exercises (ROM-active/passive), Utilize walker, cane, or other assistive device         Medication Interventions: Assess postural VS orthostatic hypotension, Bed/chair exit alarm, Evaluate medications/consider consulting pharmacy, Patient to call before getting OOB, Teach patient to arise slowly    Elimination Interventions: Elevated toilet seat, Call light in reach, Patient to call for help with toileting needs, Toilet paper/wipes in reach, Toileting schedule/hourly rounds              Problem: Patient Education: Go to Patient Education Activity  Goal: Patient/Family Education  Outcome: Progressing Towards Goal     Problem: Pain  Goal: *Control of Pain  Outcome: Progressing Towards Goal     Problem: Patient Education: Go to Patient Education Activity  Goal: Patient/Family Education  Outcome: Progressing Towards Goal     Problem: Urinary Tract Infection - Adult  Goal: *Absence of infection signs and symptoms  Outcome: Progressing Towards Goal     Problem: Patient Education: Go to Patient Education Activity  Goal: Patient/Family Education  Outcome: Progressing Towards Goal     Problem: Nutrition Deficit  Goal: *Optimize nutritional status  Outcome: Progressing Towards Goal     Problem: Patient Education: Go to Patient Education Activity  Goal: Patient/Family Education  Outcome: Progressing Towards Goal     Problem: Patient Education: Go to Patient Education Activity  Goal: Patient/Family Education  Outcome: Progressing Towards Goal

## 2020-02-24 NOTE — ROUTINE PROCESS
I have reviewed discharge instructions with the patient. The patient verbalized understanding. Discharge medications reviewed with patient and appropriate educational materials and side effects teaching were provided. Current Discharge Medication List      START taking these medications    Details   fluticasone propionate (FLONASE) 50 mcg/actuation nasal spray 2 sprays in each nostril daily  Qty: 1 Bottle, Refills: 1      lactobacillus sp. 50 billion cpu (BIO-K PLUS) 50 billion cell -375 mg cap capsule Take 1 Cap by mouth daily. Qty: 30 Cap, Refills: 2      tamsulosin (FLOMAX) 0.4 mg capsule Take 1 Cap by mouth daily. Qty: 90 Cap, Refills: 3      sodium bicarbonate 650 mg tablet Take 2 Tabs by mouth three (3) times daily. Indications: excess body acid  Qty: 30 Tab, Refills: 0      cephalexin 250 mg tab Take 250 mg by mouth two (2) times a day. Qty: 8 Tab, Refills: 0         CONTINUE these medications which have NOT CHANGED    Details   cyclobenzaprine (FLEXERIL) 5 mg tablet Take 5 mg by mouth daily. acetaminophen (TYLENOL) 325 mg tablet Take 650 mg by mouth two (2) times a day. allopurinoL (ZYLOPRIM) 100 mg tablet Take 200 mg by mouth daily. amLODIPine (NORVASC) 5 mg tablet Take 5 mg by mouth daily. ascorbic acid, vitamin C, (VITAMIN C) 500 mg tablet Take 500 mg by mouth.      calcitRIOL (ROCALTROL) 0.25 mcg capsule Take 0.25 mcg by mouth every other day. carvediloL (COREG) 12.5 mg tablet Take  by mouth two (2) times daily (with meals). cholecalciferol (VITAMIN D3) (2,000 UNITS /50 MCG) cap capsule Take 2,000 Units by mouth two (2) times a day. Take two tabs a total of 4000 units      latanoprost (XALATAN) 0.005 % ophthalmic solution Administer 1 Drop to both eyes nightly. One drop at bedtime      levothyroxine (SYNTHROID) 125 mcg tablet Take 125 mcg by mouth Daily (before breakfast). omeprazole (PRILOSEC) 20 mg capsule Take 20 mg by mouth daily.       ondansetron [facility] ODT) 4 mg disintegrating tablet Take 4 mg by mouth every eight (8) hours as needed for Nausea or Vomiting. vit B Cmplx 3-FA-Vit C-Biotin (NEPHRO HOWIE RX) 1- mg-mg-mcg tablet Take 1 Tab by mouth daily.          STOP taking these medications       gabapentin (NEURONTIN) 100 mg capsule Comments:   Reason for Stopping:         B.infantis-B.ani-B.long-B.bifi (PROBIOTIC 4X) 10-15 mg TbEC Comments:   Reason for Stopping:         meclizine (ANTIVERT) 25 mg tablet Comments:   Reason for Stopping:         traMADol (ULTRAM) 50 mg tablet Comments:   Reason for Stopping:

## 2020-02-24 NOTE — PROGRESS NOTES
Problem: Pressure Injury - Risk of  Goal: *Prevention of pressure injury  Description  Document Giovany Scale and appropriate interventions in the flowsheet. Outcome: Progressing Towards Goal  Note: Pressure Injury Interventions:  Sensory Interventions: Assess changes in LOC, Keep linens dry and wrinkle-free, Maintain/enhance activity level, Minimize linen layers, Pad between skin to skin, Pressure redistribution bed/mattress (bed type)    Moisture Interventions: Absorbent underpads, Apply protective barrier, creams and emollients, Assess need for specialty bed, Check for incontinence Q2 hours and as needed, Limit adult briefs, Maintain skin hydration (lotion/cream), Minimize layers    Activity Interventions: Increase time out of bed    Mobility Interventions: Pressure redistribution bed/mattress (bed type)    Nutrition Interventions: Document food/fluid/supplement intake    Friction and Shear Interventions: HOB 30 degrees or less, Minimize layers                Problem: Patient Education: Go to Patient Education Activity  Goal: Patient/Family Education  Outcome: Progressing Towards Goal     Problem: Falls - Risk of  Goal: *Absence of Falls  Description  Document Freddy Fall Risk and appropriate interventions in the flowsheet.   Outcome: Progressing Towards Goal  Note: Fall Risk Interventions:  Mobility Interventions: Communicate number of staff needed for ambulation/transfer, Patient to call before getting OOB, PT Consult for mobility concerns, Utilize walker, cane, or other assistive device         Medication Interventions: Assess postural VS orthostatic hypotension, Patient to call before getting OOB, Teach patient to arise slowly    Elimination Interventions: Call light in reach, Patient to call for help with toileting needs, Stay With Me (per policy)              Problem: Patient Education: Go to Patient Education Activity  Goal: Patient/Family Education  Outcome: Progressing Towards Goal     Problem: Pain  Goal: *Control of Pain  Outcome: Progressing Towards Goal     Problem: Patient Education: Go to Patient Education Activity  Goal: Patient/Family Education  Outcome: Progressing Towards Goal     Problem: Urinary Tract Infection - Adult  Goal: *Absence of infection signs and symptoms  Outcome: Progressing Towards Goal     Problem: Patient Education: Go to Patient Education Activity  Goal: Patient/Family Education  Outcome: Progressing Towards Goal     Problem: Nutrition Deficit  Goal: *Optimize nutritional status  Outcome: Progressing Towards Goal     Problem: Patient Education: Go to Patient Education Activity  Goal: Patient/Family Education  Outcome: Progressing Towards Goal     Problem: Patient Education: Go to Patient Education Activity  Goal: Patient/Family Education  Outcome: Progressing Towards Goal

## 2020-02-24 NOTE — PROGRESS NOTES
Intern Progress Note  [facility]       Patient: Jagdish Simms MRN: [id number]  CSN: [id number]    YOB: 1945  Age: 66 y.o. Sex: female    DOA: 2/20/2020 LOS:  LOS: 4 days                    Subjective:     Acute events: No acute events overnight. Pt still having incontinence. Abdominal pain has resolved. Review of Systems   Constitutional: Negative for chills and fever. Respiratory: Negative for cough and shortness of breath. Cardiovascular: Negative for chest pain and palpitations. Gastrointestinal: Negative for nausea and vomiting. Genitourinary: Negative for flank pain and hematuria. Objective:      Patient Vitals for the past 24 hrs:   Temp Pulse Resp BP SpO2   02/24/20 0945 97.5 °F (36.4 °C) 91 16 120/75 100 %   02/24/20 0556 98.2 °F (36.8 °C) 87 18 117/62 97 %   02/23/20 2150 97.9 °F (36.6 °C) 87 18 104/62 95 %   02/23/20 1841 98.2 °F (36.8 °C) 93 16 111/54 100 %   02/23/20 1454 97.1 °F (36.2 °C) 85 16 106/59 100 %         Intake/Output Summary (Last 24 hours) at 2/24/2020 1223  Last data filed at 2/24/2020 3497  Gross per 24 hour   Intake 1800 ml   Output 700 ml   Net 1100 ml       Physical Exam:   General:  AAOx3,  In no distress   HEENT: Oral Mucosa moist, poor dentition  Heart: RRR, no m/r/g  Lungs: CTAB, no r/r/w  Abdomen: Soft, obese, non-tender today, New neprhrostomy tube in place, covered in bandage, no eryhthema noted around the tube, no tenderness  Neuro Exam: No focal neuro deficits seen moving all extremities   Skin:  No rashes, lesions, or ulcers.  Good turgor.   Extremities: 1+ pitting edema in b/l lower extremities, LUE has AV fistula     Lab/Data Reviewed:    Recent Results (from the past 12 hour(s))   METABOLIC PANEL, COMPREHENSIVE    Collection Time: 02/24/20  5:15 AM   Result Value Ref Range    Sodium 144 136 - 145 mmol/L    Potassium 4.1 3.5 - 5.5 mmol/L    Chloride 115 (H) 100 - 111 mmol/L    CO2 22 21 - 32 mmol/L    Anion gap 7 3.0 - 18 mmol/L    Glucose 94 74 - 99 mg/dL    BUN 29 (H) 7.0 - 18 MG/DL    Creatinine 3.03 (H) 0.6 - 1.3 MG/DL    BUN/Creatinine ratio 10 (L) 12 - 20      GFR est AA 18 (L) >60 ml/min/1.73m2    GFR est non-AA 15 (L) >60 ml/min/1.73m2    Calcium 8.1 (L) 8.5 - 10.1 MG/DL    Bilirubin, total 0.5 0.2 - 1.0 MG/DL    ALT (SGPT) 9 (L) 13 - 56 U/L    AST (SGOT) 17 10 - 38 U/L    Alk. phosphatase 115 45 - 117 U/L    Protein, total 6.8 6.4 - 8.2 g/dL    Albumin 2.7 (L) 3.4 - 5.0 g/dL    Globulin 4.1 (H) 2.0 - 4.0 g/dL    A-G Ratio 0.7 (L) 0.8 - 1.7     CBC WITH AUTOMATED DIFF    Collection Time: 02/24/20  5:15 AM   Result Value Ref Range    WBC 4.9 4.6 - 13.2 K/uL    RBC 2.82 (L) 4.20 - 5.30 M/uL    HGB 8.6 (L) 12.0 - 16.0 g/dL    HCT 27.5 (L) 35.0 - 45.0 %    MCV 97.5 (H) 74.0 - 97.0 FL    MCH 30.5 24.0 - 34.0 PG    MCHC 31.3 31.0 - 37.0 g/dL    RDW 15.9 (H) 11.6 - 14.5 %    PLATELET 805 132 - 765 K/uL    MPV 11.1 9.2 - 11.8 FL    NEUTROPHILS 37 (L) 40 - 73 %    LYMPHOCYTES 50 21 - 52 %    MONOCYTES 8 3 - 10 %    EOSINOPHILS 4 0 - 5 %    BASOPHILS 1 0 - 2 %    ABS. NEUTROPHILS 1.8 1.8 - 8.0 K/UL    ABS. LYMPHOCYTES 2.5 0.9 - 3.6 K/UL    ABS. MONOCYTES 0.4 0.05 - 1.2 K/UL    ABS. EOSINOPHILS 0.2 0.0 - 0.4 K/UL    ABS.  BASOPHILS 0.0 0.0 - 0.1 K/UL    DF AUTOMATED     PHOSPHORUS    Collection Time: 02/24/20  5:15 AM   Result Value Ref Range    Phosphorus 3.1 2.5 - 4.9 MG/DL         Scheduled Medications Reviewed:  Current Facility-Administered Medications   Medication Dose Route Frequency    epoetin caitlin-epbx (RETACRIT) injection 4,000 Units  4,000 Units SubCUTAneous Q MON, WED & FRI    melatonin tablet 3 mg  3 mg Oral QHS    cyclobenzaprine (FLEXERIL) tablet 5 mg  5 mg Oral DAILY    tamsulosin (FLOMAX) capsule 0.4 mg  0.4 mg Oral DAILY    lactobacillus sp. 50 billion cpu (BIO-K PLUS) capsule 1 Cap  1 Cap Oral DAILY    cefTRIAXone (ROCEPHIN) 1 g in sterile water (preservative free) 10 mL IV syringe  1 g IntraVENous Q24H    acetaminophen (TYLENOL) tablet 650 mg  650 mg Oral BID    [Held by provider] allopurinoL (ZYLOPRIM) tablet 200 mg  200 mg Oral DAILY    amLODIPine (NORVASC) tablet 5 mg  5 mg Oral DAILY    ascorbic acid (vitamin C) (VITAMIN C) tablet 500 mg  500 mg Oral DAILY    calcitRIOL (ROCALTROL) capsule 0.25 mcg  0.25 mcg Oral EVERY OTHER DAY    carvediloL (COREG) tablet 12.5 mg  12.5 mg Oral BID WITH MEALS    cholecalciferol (VITAMIN D3) (1000 Units /25 mcg) tablet 4 Tab  4,000 Units Oral DAILY    [Held by provider] gabapentin (NEURONTIN) capsule 100 mg  100 mg Oral TID    latanoprost (XALATAN) 0.005 % ophthalmic solution 1 Drop  1 Drop Both Eyes QHS    levothyroxine (SYNTHROID) tablet 125 mcg  125 mcg Oral ACB    B complex-vitaminC-folic acid (NEPHROCAP) cap  1 Cap Oral DAILY    fluticasone propionate (FLONASE) 50 mcg/actuation nasal spray 2 Spray  2 Spray Both Nostrils DAILY    heparin (porcine) injection 5,000 Units  5,000 Units SubCUTAneous Q8H    0.9% sodium chloride infusion  100 mL/hr IntraVENous CONTINUOUS    pantoprazole (PROTONIX) tablet 40 mg  40 mg Oral ACB    sodium bicarbonate tablet 1,300 mg  1,300 mg Oral TID       Assessment/Plan   66 y. o. female with PMH CKD Stage 5 on procrit, hx of recurrent MDR UTIs/stones s/p R nephrostomy tube (06/20), HTN, DM II w/ neuropathy, anemia, galucoma, GERD, CTS, OA in back and knees now admitted with complicated UTI     Complicated UTI vs pyelonephritis/Hx of recurrent UTI/nephrolithiasis w/MDR s/p R Nephrostomy tube placement 2019/CKD Stage 5, urinary incontinence. Patient had nephrostomy tubes replaced. Has AV fistula in place, nephro following and recommending no need for emergent dialysis. Urine culture grew staph.  ID recs  - FU urology, nephro, ID recs  - bladder scans q4h  - po cephalexin x 4 more days  - F/U UCx, mixed mike right now but pt improving on abx  - Continue Probiotics per ID  - DC today with abx      JADON on CKD Stage 5, chronic metabolic acidosis - resolving  Cr 4.27 upon admission; baseline 3.13. Cr trending down   - Continue /hr  - Daily BMPs, avoid nephrotoxic drugs, renally dose meds  - Continue on home NaHCO2 650mg two tablets TID     Upper Respiratory Infection likely 2/2 viral etiology  3 d hx of rhinorrhea, nasal congestion but no fever, cough or myalgias. CXR showing \"Question right lung  pulmonary nodular densities\" Patient denies any fever/chills/SOB- does endorse URI sxs with ear pain today. - Flonase to help with congestion  - Tylenol PRN     Anemia of Chronic Disease likely 2/2 to her CKD Stage 5  HgB 10.0 on admission, patient on Procrit but unclear on dose/frequency. Will defer starting patient back on Procrit to Nephrology. Hgb stable this AM, no active sources of bleeding. Iron, iron saturation and TIBC low normal, Ferritin WNL.  Likely multifactorial anemia   - Monitor daily CBC      Chronic HTN- controlled  - Continue home Amlodipine 5mg and Coreg 12.5mg BID   - Monitor via VS per unit routine     Hx of DM II w/ neuropathy  Last HbA1C 4.9; takes Gabapentin at home 100mg TID   - Continue to monitor BG thorough daily BMPs   - Hold off on Gabapentin given her worsening kidney function and JADON; can resume if okay with Nephrology     GERD - chronic hx  On Omeprazole at home  - Protonix 40mg qday while admitted     Hypothyroidism - chronic   - Continue home Synthroid 125 mcg po qhs     Diet Renal Diet    DVT Prophylaxis SQH   GI Prophylaxis Protonix   Code status DNR/DNI   Disposition Home      Point of Contact Makenzie Topete  Relationship:  [address], [place]   [facility] Neftali Ch Intern  02/24/20 12:23 PM

## 2020-02-24 NOTE — CDMP QUERY
After further study, do you concur with the findings of sepsis noted in the nephrology Consultation note per Dr. Monica Cedeno? ? R/o Sepsis 
? sepsis ? Other Explanation of the clinical findings ? Clinically Undetermined (no explanation for clinical findings) The medical record reflects the following clinical findings, risk factors and treatment:  
 
Risk Factors:  Complicated UTI, nephrostomy tubes, CKD Clinical Indicators:  WBC range 5.2-8.8 from 2/20/2020 to 2/24/2020 Temp range 97-98.7 degrees on 2/20/2020 to 2/24/2020 Heart rate range 76/102 beats/min on 2/20/2020 to 2/24/2020 Respiratory rate range 15-21 breaths/min on 2/20/2020 to 2/24/2020 SBP range  mmHg on 2/20/2020 to 2/24/2020 Urine culture on 2/20/2020: \"MIXED GRAM POSITIVE [place], PROBABLE SKIN/GENITAL CONTAMINATION. INCLUDING 09588 COLONIES/mL STAPHYLOCOCCUS AUREUS \"  
 
Treatment:  cefriaxone 1g IV q24h start 2/20/2020 at 1800 Please clarify and document your clinical opinion in the progress notes and discharge summary including the definitive and/or presumptive diagnosis, (suspected or probable), related to the above clinical findings. Please include clinical findings supporting your diagnosis. Thank you, [address], [address], [address] [place] [address]  the CMS guidelines 
-AMS (from baseline if known) -SBP<90 or MAP<65 
-Documentation of respiratory failure and use of either invasive mechanical ventilation (intubation) or non-invasive mechanical ventilation (CPAP/BIPAP) -Creat. > 2.0 (with no history of Chronic Kidney Disease) -Bilirubin > 2 mg / dl (with no history of liver disease) -PLT < 100,000 (with no history of thrombocytopenia) -INR > 1.5 or aPTT > 60 sec (for patients not on Warfarin therapy) -Lactic Acid > 2 mmol/ L

## 2020-02-24 NOTE — ROUTINE PROCESS
Received report from [facility]. Pt AAOx3, NAD, breathing non labored, on room air, HOB up. IV site clean, dry and intact. IVF going per order. Right nephrostomy tube in place. AVF left arm, positive for bruit and thrill. Bed at the lowest level on lock position, call bell w/i reach. Bedside and Verbal shift change report given to JOSE ANTONIO Jauregui (oncoming nurse) by me (offgoing nurse). Report included the following information SBAR, Kardex, Intake/Output, MAR and Recent Results.

## 2020-02-24 NOTE — PROGRESS NOTES
Important Message from [facility]" reviewed and explained with the patient and/or representative at bedside and signature was obtained. A signed copy provided to patient/representative. Original signed document placed in patient's chart.

## 2020-02-24 NOTE — PROGRESS NOTES
Progress Note      Patient: Colby Waddell MRN: [id number]  SSN: [id number]    YOB: 1945  Age: 66 y.o. Sex: female      Admit Date: 2/20/2020    LOS: 4 days     Assessment:   #1 Right distal ureteral stricture managed with nephrostomy tube since 2018 (followed by urologist in [place])  #2 CKD stage IV, baseline creartinine 3.0   #3 Obesity    Tube exchanged  Feeling well  Follow up arranged  Consideration if possible candidate for repair for DMSA, Antegrade nephrostogram to assess length, patency of stricture     Plan:   1. Follow up arranged  2. Neph tube change q3 months  3. Consider further workup for distal stricture    Signed By: Ebony Mcdermott MD     February 24, 2020      PAGER:  [phone]  OFFICE:  [address] [place] [address]    Objective:     Vitals:    02/23/20 1841 02/23/20 2150 02/24/20 0556 02/24/20 0945   BP: 111/54 104/62 117/62 120/75   Pulse: 93 87 87 91   Resp: 16 18 18 16   Temp: 98.2 °F (36.8 °C) 97.9 °F (36.6 °C) 98.2 °F (36.8 °C) 97.5 °F (36.4 °C)   SpO2: 100% 95% 97% 100%   Weight:       Height:            Intake and Output:  Current Shift: No intake/output data recorded. Last three shifts: 02/22 1901 - 02/24 0700  In: 1800 [P.O.:600;  I.V.:1200]  Out: 1400 [Urine:1400]    Current Facility-Administered Medications   Medication Dose Route Frequency    epoetin caitlin-epbx (RETACRIT) injection 4,000 Units  4,000 Units SubCUTAneous Q MON, WED & FRI    melatonin tablet 3 mg  3 mg Oral QHS    cyclobenzaprine (FLEXERIL) tablet 5 mg  5 mg Oral DAILY    tamsulosin (FLOMAX) capsule 0.4 mg  0.4 mg Oral DAILY    lactobacillus sp. 50 billion cpu (BIO-K PLUS) capsule 1 Cap  1 Cap Oral DAILY    cefTRIAXone (ROCEPHIN) 1 g in sterile water (preservative free) 10 mL IV syringe  1 g IntraVENous Q24H    acetaminophen (TYLENOL) tablet 650 mg  650 mg Oral BID    [Held by provider] allopurinoL (ZYLOPRIM) tablet 200 mg  200 mg Oral DAILY  amLODIPine (NORVASC) tablet 5 mg  5 mg Oral DAILY    ascorbic acid (vitamin C) (VITAMIN C) tablet 500 mg  500 mg Oral DAILY    calcitRIOL (ROCALTROL) capsule 0.25 mcg  0.25 mcg Oral EVERY OTHER DAY    carvediloL (COREG) tablet 12.5 mg  12.5 mg Oral BID WITH MEALS    cholecalciferol (VITAMIN D3) (1000 Units /25 mcg) tablet 4 Tab  4,000 Units Oral DAILY    [Held by provider] gabapentin (NEURONTIN) capsule 100 mg  100 mg Oral TID    latanoprost (XALATAN) 0.005 % ophthalmic solution 1 Drop  1 Drop Both Eyes QHS    levothyroxine (SYNTHROID) tablet 125 mcg  125 mcg Oral ACB    ondansetron (ZOFRAN ODT) tablet 4 mg  4 mg Oral Q8H PRN    B complex-vitaminC-folic acid (NEPHROCAP) cap  1 Cap Oral DAILY    fluticasone propionate (FLONASE) 50 mcg/actuation nasal spray 2 Spray  2 Spray Both Nostrils DAILY    heparin (porcine) injection 5,000 Units  5,000 Units SubCUTAneous Q8H    0.9% sodium chloride infusion  100 mL/hr IntraVENous CONTINUOUS    pantoprazole (PROTONIX) tablet 40 mg  40 mg Oral ACB    sodium bicarbonate tablet 1,300 mg  1,300 mg Oral TID         Physical Exam:   GENERAL: alert, cooperative, no distress, appears stated age  LUNG: unlabored breathing  ABDOMEN: soft, non-tender. No masses,  no organomegaly  EXTREMITIES:  extremities normal, atraumatic, no cyanosis or edema  SKIN: no jaundice  : no CVA tenderness.  R neph tube draining CYU     Lab/Data Review:  BMP:   Lab Results   Component Value Date/Time     02/24/2020 05:15 AM    K 4.1 02/24/2020 05:15 AM     (H) 02/24/2020 05:15 AM    CO2 22 02/24/2020 05:15 AM    AGAP 7 02/24/2020 05:15 AM    GLU 94 02/24/2020 05:15 AM    BUN 29 (H) 02/24/2020 05:15 AM    CREA 3.03 (H) 02/24/2020 05:15 AM    GFRAA 18 (L) 02/24/2020 05:15 AM    GFRNA 15 (L) 02/24/2020 05:15 AM     CBC:   Lab Results   Component Value Date/Time    WBC 4.9 02/24/2020 05:15 AM    HGB 8.6 (L) 02/24/2020 05:15 AM    HCT 27.5 (L) 02/24/2020 05:15 AM     02/24/2020 05:15 AM     COAGS: No results found for: APTT, PTP, INR, INREXT, INREXT       CT Results:  Results from Hospital Encounter encounter on 02/20/20   CT ABD PELV WO CONT    Narrative CT ABDOMEN AND PELVIS WITHOUT CONTRAST    COMPARISON: None. INDICATIONS: Hematuria, chronic right nephrostomy. ,.    TECHNIQUE: Volumetric data acquisition was performed of the abdomen and pelvis  on a multislice scanner and reconstructed in axial coronal and sagittal planes    CT ABDOMEN FINDINGS:     Lung Bases: Clear. Liver/Gallbladder/Biliary: Gallbladder surgically absent. The liver and biliary  tree unremarkable. Spleen: Normal.    Adrenal Glands: Normal.    Kidneys: Right kidney is lobulated with a nephrostomy tube in place. Hypodensity  suggestive of cysts are present. Not all can be characterized. he collecting  system is not dilated. The left kidney is also lobulated. Exophytic hypodensity  is consistent with a cyst. No hydronephrosis. . No calculi evident    Pancreas: Normal.    Stomach, Small Bowel, and Colon: Gastric bypass. Small bowel unremarkable. The  appendix is not well demonstrated. The colon contains a rare diverticuli but is  otherwise nremarkable. Lymph Nodes: Normal in size and number. The abdominal aorta is unremarkable. The IVC is unremarkable. Peritoneal Spaces: No free fluid or free air is present. Abdominal wall: No hernia or mass is evident    Bladder: The bladder is markedly edematous with haziness of the perivesical fat. Osseous Structures Of Abdomen And Pelvis: Unremarkable for age. Impression IMPRESSION:     Right kidney shows no dilation of the collecting system with nephrostomy in  place. Bilateral renal cysts. Alcohol the renal hypodensities characterized on this  noncontrast study; small solid nodule not excluded  Severe edema of the bladder and perivesical region. There is diverticulosis without diverticulitis            .     All CT scans at this facility are performed using dose optimization technique as  appropriate to the performed exam, to include automated exposure control,  adjustment of the mA and/or kV according to patient's size (Including  appropriate matching for site-specific examinations), or use of iterative  reconstruction technique. US Results:  No results found for this or any previous visit.                               Principal Problem:    Complicated urinary tract infection (2/20/2020)    Active Problems:    Pyelonephritis (2/20/2020)

## 2020-02-26 NOTE — DISCHARGE SUMMARY
[address] Medicine  Discharge Summary    Patient: Edwardo Bloom MRN: [id number]  CSN: [id number]    YOB: 1945  Age: 66 y.o. Sex: female      Admission Date: 2/20/2020 Discharge Date: 2/24/20   Attending: No att. providers found PCP: Kriss Rico MD     ===================================================================    Reason for Admission: Pyelonephritis [S46]  Complicated urinary tract infection [N39.0]    Discharge Diagnoses:   Complicated UTI (resolved)  JADON (resolved)  URI (stable)  Anemia of chronic disease (stable)  Chronic HTN (stable)    Important notes to PCP/ follow-up studies and evaluations   Ensure FU with urology and nephrology    Pending labs and studies:      Operative Procedures:       Discharge Medications:     Discharge Medication List as of 2/24/2020  1:43 PM      START taking these medications    Details   fluticasone propionate (FLONASE) 50 mcg/actuation nasal spray 2 sprays in each nostril daily, Print, Disp-1 Bottle, R-1      lactobacillus sp. 50 billion cpu (BIO-K PLUS) 50 billion cell -375 mg cap capsule Take 1 Cap by mouth daily. , Print, Disp-30 Cap, R-2      tamsulosin (FLOMAX) 0.4 mg capsule Take 1 Cap by mouth daily. , Print, Disp-90 Cap, R-3      cephalexin 250 mg tab Take 250 mg by mouth two (2) times a day., Print, Disp-8 Tab, R-0      sodium bicarbonate 650 mg tablet Take 2 Tabs by mouth three (3) times daily. Indications: excess body acid, Print, Disp-30 Tab, R-0         CONTINUE these medications which have NOT CHANGED    Details   cyclobenzaprine (FLEXERIL) 5 mg tablet Take 5 mg by mouth daily. , Historical Med      acetaminophen (TYLENOL) 325 mg tablet Take 650 mg by mouth two (2) times a day., Historical Med      allopurinoL (ZYLOPRIM) 100 mg tablet Take 200 mg by mouth daily. , Historical Med      amLODIPine (NORVASC) 5 mg tablet Take 5 mg by mouth daily. , Historical Med      ascorbic acid, vitamin C, (VITAMIN C) 500 mg tablet Take 500 mg by mouth., Historical Med      calcitRIOL (ROCALTROL) 0.25 mcg capsule Take 0.25 mcg by mouth every other day., Historical Med      carvediloL (COREG) 12.5 mg tablet Take  by mouth two (2) times daily (with meals). , Historical Med      cholecalciferol (VITAMIN D3) (2,000 UNITS /50 MCG) cap capsule Take 2,000 Units by mouth two (2) times a day. Take two tabs a total of 4000 units, Historical Med      latanoprost (XALATAN) 0.005 % ophthalmic solution Administer 1 Drop to both eyes nightly. One drop at bedtime, Historical Med      levothyroxine (SYNTHROID) 125 mcg tablet Take 125 mcg by mouth Daily (before breakfast). , Historical Med      omeprazole (PRILOSEC) 20 mg capsule Take 20 mg by mouth daily. , Historical Med      ondansetron (ZOFRAN ODT) 4 mg disintegrating tablet Take 4 mg by mouth every eight (8) hours as needed for Nausea or Vomiting., Historical Med      vit B Cmplx 3-FA-Vit C-Biotin (NEPHRO HOWIE RX) 1- mg-mg-mcg tablet Take 1 Tab by mouth daily. , Historical Med         STOP taking these medications       gabapentin (NEURONTIN) 100 mg capsule Comments:   Reason for Stopping:         meclizine (ANTIVERT) 25 mg tablet Comments:   Reason for Stopping:         B.infantis-B.ani-B.long-B.bifi (PROBIOTIC 4X) 10-15 mg TbEC Comments:   Reason for Stopping:         traMADol (ULTRAM) 50 mg tablet Comments:   Reason for Stopping:               Disposition: Home with Home Health    Consultants:    Nephro, Urology, [address] [place] [address] Course (including pertinent history and physical findings)  66 y. o. female with PMH CKD Stage 5 on procrit, hx of recurrent MDR UTIs/stones s/p R nephrostomy tube (06/20), HTN, DM II w/ neuropathy, anemia, galucoma, GERD, CTS, OA in back and knees now admitted with complicated UTI     Complicated UTI vs pyelonephritis/Hx of recurrent UTI/nephrolithiasis w/MDR s/p R Nephrostomy tube placement 2019/CKD Stage 5, urinary incontinence. Patient had nephrostomy tubes replaced.  Has AV fistula in place, nephro following and recommending no need for emergent dialysis. Urine culture grew staph. ID recs   - [facility] urology, nephro, ID recs  - bladder scans q4h  - po cephalexin x 4 more days  - F/U UCx, mixed mike right now but pt improving on abx  - Continued Probiotics per ID  - DC'ed on PO abx as above      JADON on CKD Stage 5, chronic metabolic acidosis - resolving  Cr 4.27 upon admission; baseline 3.13. Cr trended down   - Continued /hr  - Daily BMPs, avoid nephrotoxic drugs, renally dose meds  - Continued on home NaHCO2 650mg two tablets TID     Upper Respiratory Infection likely 2/2 viral etiology  3 d hx of rhinorrhea, nasal congestion but no fever, cough or myalgias.  CXR showing \"Question right lung  pulmonary nodular densities\" Patient denies any fever/chills/SOB- does endorse URI sxs with ear pain today.   - Flonase to help with congestion  - Tylenol PRN     Anemia of Chronic Disease likely 2/2 to her CKD Stage 5  HgB 10.0 on admission, patient on Procrit but unclear on dose/frequency. Will defer starting patient back on Procrit to Nephrology. Hgb stable this AM, no active sources of bleeding. Iron, iron saturation and TIBC low normal, Ferritin WNL. Likely multifactorial anemia   - Monitored daily CBC      Chronic HTN- controlled  - Continued home Amlodipine 5mg and Coreg 12.5mg BID   - Monitored via VS per unit routine     Hx of DM II w/ neuropathy  Last HbA1C 4.9; takes Gabapentin at home 100mg TID   - Continued to monitor BG thorough daily BMPs   - Held off on Gabapentin given her worsening kidney function and JADON; can resume if okay with Nephrology     GERD - chronic hx  On Omeprazole at home  - Protonix 40mg qday while admitted     Hypothyroidism - chronic   - Continued home Synthroid 125 mcg po qhs    CURRENT ADMISSION IMAGING RESULTS   Xr Chest Pa Lat    Result Date: 2/21/2020  IMPRESSION: Question right lung pulmonary nodular densities. CT may be obtained for clarification.  Additional findings as discussed. Ir Exchange Nephro Perc Lt Si    Result Date: 2/21/2020  IMPRESSION:  1. Successful uncomplicated right percutaneous nephrostomy catheter exchange over wire. Plan: Right PCN should be exchanged on a routine basis every 3 months. Cardiology Procedures/Testing:  MODALITY RESULTS   EKG Results for orders placed or performed during the hospital encounter of 01/16/20   EKG, 12 LEAD, INITIAL   Result Value Ref Range    Ventricular Rate 93 BPM    Atrial Rate 97 BPM    QRS Duration 86 ms    Q-T Interval 342 ms    QTC Calculation (Bezet) 425 ms    Calculated R Axis -31 degrees    Calculated T Axis 38 degrees    Diagnosis       suspect NSR with first degree AVB and artifact  Left axis deviation  Moderate voltage criteria for LVH, may be normal variant  Abnormal ECG  When compared with ECG of 10-AQUILES-2020 16:30,  No significant change was found  Confirmed by Ronny Portillo ([id number]) on 1/17/2020 9:24:02 PM         ECHO     Nuclear Medicine No results found for this or any previous visit. IR Results from [place] encounter on 02/20/20   IR EXCHANGE NEPHRO PERC LT SI    Impression IMPRESSION:      1. Successful uncomplicated right percutaneous nephrostomy catheter exchange  over wire. Plan: Right PCN should be exchanged on a routine basis every 3 months. CATH       Special Testing/Procedures:  MODALITY RESULTS   MICRO All Micro Results     Procedure Component Value Units Date/Time    CULTURE, URINE [[id number]]  (Abnormal)  (Susceptibility) Collected:  02/20/20 2320    Order Status:  Completed Specimen:  Urine from Clean catch Updated:  02/24/20 0281     Special Requests: NO SPECIAL REQUESTS        Culture result:       >100,000  COLONIES/mL  MIXED GRAM POSITIVE [place], PROBABLE SKIN/GENITAL CONTAMINATION.         INCLUDING 48589 COLONIES/mL STAPHYLOCOCCUS AUREUS    CULTURE, URINE [[id number]]     Order Status:  Canceled Specimen:  Urine from Clean catch          ABG No results found for: PH, PHI, PCO2, PCO2I, PO2, PO2I, HCO3, HCO3I, FIO2, FIO2I   UA No results found for this or any previous visit. OLU [unfilled]   [unfilled]    PATH      Laboratory Results:  LABORATORY RESULTS   HEMATOLOGY Lab Results   Component Value Date/Time    WBC 4.9 02/24/2020 05:15 AM    HGB 8.6 (L) 02/24/2020 05:15 AM    HCT 27.5 (L) 02/24/2020 05:15 AM    PLATELET 695 44/49/3882 05:15 AM    MCV 97.5 (H) 02/24/2020 05:15 AM       CHEMISTRIES Lab Results   Component Value Date/Time    Sodium 144 02/24/2020 05:15 AM    Potassium 4.1 02/24/2020 05:15 AM    Chloride 115 (H) 02/24/2020 05:15 AM    CO2 22 02/24/2020 05:15 AM    Anion gap 7 02/24/2020 05:15 AM    Glucose 94 02/24/2020 05:15 AM    BUN 29 (H) 02/24/2020 05:15 AM    Creatinine 3.03 (H) 02/24/2020 05:15 AM    BUN/Creatinine ratio 10 (L) 02/24/2020 05:15 AM    GFR est AA 18 (L) 02/24/2020 05:15 AM    GFR est non-AA 15 (L) 02/24/2020 05:15 AM    Calcium 8.1 (L) 02/24/2020 05:15 AM      HEPATIC FUNCTION Lab Results   Component Value Date/Time    Albumin 2.7 (L) 02/24/2020 05:15 AM    Bilirubin, total 0.5 02/24/2020 05:15 AM    Protein, total 6.8 02/24/2020 05:15 AM    Globulin 4.1 (H) 02/24/2020 05:15 AM    A-G Ratio 0.7 (L) 02/24/2020 05:15 AM    AST (SGOT) 17 02/24/2020 05:15 AM    ALT (SGPT) 9 (L) 02/24/2020 05:15 AM    Alk.  phosphatase 115 02/24/2020 05:15 AM       LACTIC ACID No results found for: LAC   CARDIAC PANEL No results found for: CPK, RCK1, RCK2, RCK3, RCK4, CKMB, CKNDX, CKND1, TROPT, TROIQ, BNPP, BNP   NT-proBNP No results found for: BNP, BNPP, BNPPPOC, XBNPT, BNPNT   THYROID No results found for: TSH, TSHEXT, TSH2, TSH3, TSHP, TSHELE, TT3, T3U, T3UP, FRT3, FT3, T3T, FT4, FT4P, T4, T4P, FT4T, TT7, G0HTVUNSE, TSHEXT   LIPID PANEL No results found for: CHOL, CHOLPOCT, CHOLX, CHLST, CHOLV, HDL, HDLPOC, HDLP, LDL, LDLCPOC, LDLC, DLDLP, VLDLC, VLDL, TGLX, TRIGL, TRIGP, TGLPOCT, CHHD, CHHDX      RISK CALCULATORS:  SCORE RESULT   ASCVD The ASCVD Risk score (Liz Whitley et al., 2013) failed to calculate for the following reasons:    ASCVD risk score not calculated    [id number]     HAS-BLED    READMISSION RISK SCORE Low Risk            5       Total Score        5 Pt. Coverage (Medicare=5 , Medicaid, or Self-Pay=4)        Criteria that do not apply:    Has Seen PCP in Last 6 Months (Yes=3, No=0)    . Living with Significant Other. Assisted Living. LTAC. SNF. or   Rehab    Patient Length of Stay (>5 days = 3)    IP Visits Last 12 Months (1-3=4, 4=9, >4=11)    Charlson Comorbidity Score (Age + Comorbid Conditions)           Functional status and cognitive function:    Ambulates  Status: alert, cooperative, no distress, appears stated age  Condition: STABLE  Disposition: Home    Diet: Cardiac    Code status and advanced care plan: DNR    Point of Contact Alli Mcdonald  Relationship:  [phone]     Patient Education:  Patient was educated on the following topics prior to discharge:UTI    Follow-up:   Follow-up Information     Follow up With Specialties Details Why Contact Info    Jane Sanches MD Family Practice On 2/25/2020 at 1:40 PM For hospital follow up [facility] [address] [facility] MICA[facility]. Verneice Carrel, MD Nephrology Schedule an appointment as soon as possible for a visit on 2/28/2020 February 28, 2020 @ 10:40 with Dr. Irina Montejo.  [id number]  [address]  [phone]          =================================================================  Teresa Velez MD, PGY-1   [address] Medicine   Intern Pager: [phone]   February 25, 2020, 7:55 PM

## 2020-02-27 NOTE — PROGRESS NOTES
[facility]    Chief Complaint   Patient presents with    New Patient    End Stage Renal Disease       History and Physical    Ms Stern recently moved to this area  Late spring 2019 she had a left arm avf creation in anticipation of dialysis but has not yet had to start. She is now established with local nephrologist and may need to start soon and they want evaluation for use    She says [place] surgeon [place] of [place] said it would be ready whenever  She did have duplex during a recent hospitalization locally and it does meet criteria with flow/depth/size    History reviewed. No pertinent past medical history. Patient Active Problem List   Diagnosis Code    Pyelonephritis I85    Complicated urinary tract infection N39.0     Past Surgical History:   Procedure Laterality Date    IR EXCHANGE NEPHRO PERC LT SI  2/21/2020     Current Outpatient Medications   Medication Sig Dispense Refill    fluticasone propionate (FLONASE) 50 mcg/actuation nasal spray 2 sprays in each nostril daily 1 Bottle 1    lactobacillus sp. 50 billion cpu (BIO-K PLUS) 50 billion cell -375 mg cap capsule Take 1 Cap by mouth daily. 30 Cap 2    tamsulosin (FLOMAX) 0.4 mg capsule Take 1 Cap by mouth daily. 90 Cap 3    cephalexin 250 mg tab Take 250 mg by mouth two (2) times a day. 8 Tab 0    sodium bicarbonate 650 mg tablet Take 2 Tabs by mouth three (3) times daily. Indications: excess body acid 30 Tab 1    acetaminophen (TYLENOL) 325 mg tablet Take 650 mg by mouth two (2) times a day.  allopurinoL (ZYLOPRIM) 100 mg tablet Take 200 mg by mouth daily.  amLODIPine (NORVASC) 5 mg tablet Take 5 mg by mouth daily.  ascorbic acid, vitamin C, (VITAMIN C) 500 mg tablet Take 500 mg by mouth.  calcitRIOL (ROCALTROL) 0.25 mcg capsule Take 0.25 mcg by mouth every other day.  carvediloL (COREG) 12.5 mg tablet Take  by mouth two (2) times daily (with meals).       cholecalciferol (VITAMIN D3) (2,000 UNITS /50 MCG) cap capsule Take 2,000 Units by mouth two (2) times a day. Take two tabs a total of 4000 units      cyclobenzaprine (FLEXERIL) 5 mg tablet Take 5 mg by mouth daily.  latanoprost (XALATAN) 0.005 % ophthalmic solution Administer 1 Drop to both eyes nightly. One drop at bedtime      levothyroxine (SYNTHROID) 125 mcg tablet Take 125 mcg by mouth Daily (before breakfast).  omeprazole (PRILOSEC) 20 mg capsule Take 20 mg by mouth daily.  ondansetron (ZOFRAN ODT) 4 mg disintegrating tablet Take 4 mg by mouth every eight (8) hours as needed for Nausea or Vomiting.  vit B Cmplx 3-FA-Vit C-Biotin (NEPHRO HOWIE RX) 1- mg-mg-mcg tablet Take 1 Tab by mouth daily.        Allergies   Allergen Reactions    Ciprofloxacin Hives    Statins-Hmg-Coa Reductase Inhibitors Other (comments)     Body ache     Social History     Socioeconomic History    Marital status: SINGLE     Spouse name: Not on file    Number of children: Not on file    Years of education: Not on file    Highest education level: Not on file   Occupational History    Not on file   Social Needs    Financial resource strain: Not on file    Food insecurity:     Worry: Not on file     Inability: Not on file    Transportation needs:     Medical: Not on file     Non-medical: Not on file   Tobacco Use    Smoking status: Never Smoker    Smokeless tobacco: Never Used   Substance and Sexual Activity    Alcohol use: Never     Frequency: Never    Drug use: Never    Sexual activity: Not on file   Lifestyle    Physical activity:     Days per week: Not on file     Minutes per session: Not on file    Stress: Not on file   Relationships    Social connections:     Talks on phone: Not on file     Gets together: Not on file     Attends Muslim service: Not on file     Active member of club or organization: Not on file     Attends meetings of clubs or organizations: Not on file     Relationship status: Not on file    Intimate partner violence:     Fear of current or ex partner: Not on file     Emotionally abused: Not on file     Physically abused: Not on file     Forced sexual activity: Not on file   Other Topics Concern    Not on file   Social History Narrative    Not on file      History reviewed. No pertinent family history. Review of Systems    Review of Systems - History obtained from chart review  General ROS: negative for  - chills, fever, night sweats, weight gain and weight loss  Psychological ROS: negative for - anxiety and depression  Ophthalmic ROS: negative for - blurry vision, decreased vision or loss of vision  ENT ROS: negative for - headaches, hearing change or visual changes  Hematological and Lymphatic ROS: negative for - bruising, jaundice  Respiratory ROS: negative for - cough, hemoptysis, shortness of breath, orthopnea, paroxysmal dyspnea, or wheezing  Cardiovascular ROS: negative for - chest pain, edema, loss of consciousness, or palpitations   Gastrointestinal ROS: negative for - abdominal pain, blood in stools, change in stools, constipation, diarrhea, hematemesis, melena or swallowing difficulty/pain      Physical Exam:    Visit Vitals  /66 (BP 1 Location: Left arm, BP Patient Position: Sitting)   Pulse 70   Resp 17   Ht 5' 2\" (1.575 m)   Wt 241 lb (109.3 kg)   BMI 44.08 kg/m²      General:  Alert, cooperative, no distress. Head:  Normocephalic, without obvious abnormality, atraumatic. Eyes:    Conjunctivae/corneas clear. Pupils equal, round, reactive to light. Extraocular movements intact. Extremities: Left upper arm avf   It is quite medial but easily palpable and good thrill bruit  Ease of feeling it is with more external rotation of arm and support of her redundant skin    Pulses: No steal effect   Skin: No skin lesions on arm     Vascular studies:  Left Hemodialysis Access     AV fistula, of the left upper extremity. Left extremity widely patent with no evidence of hemodynamically significant stenosis.  The left arteriovenous fistula appears to be less than 6 mm deep. The left arteriovenous fistula appears to be of adequate size       Impression and Plan:  1. CKD (chronic kidney disease) stage 5, GFR less than 15 ml/min ([facility])      No orders of the defined types were placed in this encounter. Logistically, her fistula meets criteria for use  Positionally I stated I would reach out to nephrology  She is due to start attending classes soon. I would suggest having her sit in a treatment chair and have nurses/techs evaluate her positioning to see if able to easily cannulate since fistula more medial and since she has had weight loss since created and redundant skin, the skin needs to be supported from below to bring this fistula to a more feasible position  If not confident in accessing then we should consider a transposition procedure. I certainly want to avoid this for her, but I think it best to have those who would cannulate to assess this for both hers and their comfort and let us know if we need to do further intervention     Follow-up and Dispositions    · Return if symptoms worsen or fail to improve. NOVA Faye    Portions of this note have been created using voice recognition software.

## 2020-02-28 ENCOUNTER — OFFICE VISIT (OUTPATIENT)
Dept: VASCULAR SURGERY | Age: 75
End: 2020-02-28

## 2020-02-28 VITALS
HEART RATE: 70 BPM | DIASTOLIC BLOOD PRESSURE: 66 MMHG | RESPIRATION RATE: 17 BRPM | HEIGHT: 62 IN | WEIGHT: 241 LBS | SYSTOLIC BLOOD PRESSURE: 110 MMHG | BODY MASS INDEX: 44.35 KG/M2

## 2020-02-28 DIAGNOSIS — N18.5 CKD (CHRONIC KIDNEY DISEASE) STAGE 5, GFR LESS THAN 15 ML/MIN (HCC): Primary | ICD-10-CM

## 2020-02-28 NOTE — PROGRESS NOTES
1. Have you been to an emergency room or urgent care clinic since your last visit? NO    Hospitalized since your last visit? If yes, where, when, and reason for visit? [address]  2. Have you seen or consulted any other health care providers outside of the [facility] since your last visit including any procedures, health maintenance items. If yes, where, when and reason for visit? no       Patient and son unable to verify meds. Forgot to bring a lest but states she is taking everything she was discharged from the hospital with.  ES

## 2020-03-19 PROBLEM — D63.1 ANEMIA OF CHRONIC RENAL FAILURE: Status: ACTIVE | Noted: 2020-03-19

## 2020-03-19 PROBLEM — N18.9 ANEMIA OF CHRONIC RENAL FAILURE: Status: ACTIVE | Noted: 2020-03-19

## 2020-03-26 ENCOUNTER — HOSPITAL ENCOUNTER (OUTPATIENT)
Dept: INFUSION THERAPY | Age: 75
End: 2020-03-26

## 2020-03-26 DIAGNOSIS — D63.1 ANEMIA OF CHRONIC RENAL FAILURE, UNSPECIFIED CKD STAGE: ICD-10-CM

## 2020-03-26 DIAGNOSIS — N18.9 ANEMIA OF CHRONIC RENAL FAILURE, UNSPECIFIED CKD STAGE: ICD-10-CM

## 2020-04-07 ENCOUNTER — HOSPITAL ENCOUNTER (OUTPATIENT)
Dept: INFUSION THERAPY | Age: 75
End: 2020-04-07
Payer: MEDICARE

## 2020-04-09 ENCOUNTER — APPOINTMENT (OUTPATIENT)
Dept: INFUSION THERAPY | Age: 75
End: 2020-04-09
Payer: MEDICARE

## 2020-04-10 ENCOUNTER — HOSPITAL ENCOUNTER (OUTPATIENT)
Dept: INFUSION THERAPY | Age: 75
Discharge: HOME | End: 2020-04-10
Payer: MEDICARE

## 2020-04-10 VITALS
OXYGEN SATURATION: 97 % | DIASTOLIC BLOOD PRESSURE: 66 MMHG | WEIGHT: 240 LBS | TEMPERATURE: 98.3 F | HEART RATE: 69 BPM | RESPIRATION RATE: 20 BRPM | HEIGHT: 62 IN | BODY MASS INDEX: 44.16 KG/M2 | SYSTOLIC BLOOD PRESSURE: 124 MMHG

## 2020-04-10 DIAGNOSIS — D63.1 ANEMIA OF CHRONIC RENAL FAILURE, UNSPECIFIED CKD STAGE: Primary | ICD-10-CM

## 2020-04-10 DIAGNOSIS — N18.9 ANEMIA OF CHRONIC RENAL FAILURE, UNSPECIFIED CKD STAGE: ICD-10-CM

## 2020-04-10 DIAGNOSIS — D63.1 ANEMIA OF CHRONIC RENAL FAILURE, UNSPECIFIED CKD STAGE: ICD-10-CM

## 2020-04-10 DIAGNOSIS — N18.9 ANEMIA OF CHRONIC RENAL FAILURE, UNSPECIFIED CKD STAGE: Primary | ICD-10-CM

## 2020-04-10 LAB
BASO+EOS+MONOS # BLD AUTO: 0.6 K/UL (ref 0–2.3)
BASO+EOS+MONOS NFR BLD AUTO: 11 % (ref 0.1–17)
DIFFERENTIAL METHOD BLD: ABNORMAL
ERYTHROCYTE [DISTWIDTH] IN BLOOD BY AUTOMATED COUNT: 13.9 % (ref 11.5–14.5)
FERRITIN SERPL-MCNC: 79 NG/ML (ref 8–388)
HCT VFR BLD AUTO: 29.3 % (ref 36–48)
HGB BLD-MCNC: 9.2 G/DL (ref 12–16)
IRON SATN MFR SERPL: 24 % (ref 20–50)
IRON SERPL-MCNC: 58 UG/DL (ref 50–175)
LYMPHOCYTES # BLD: 2.7 K/UL (ref 1.1–5.9)
LYMPHOCYTES NFR BLD: 45 % (ref 14–44)
MCH RBC QN AUTO: 31.1 PG (ref 25–35)
MCHC RBC AUTO-ENTMCNC: 31.4 G/DL (ref 31–37)
MCV RBC AUTO: 99 FL (ref 78–102)
NEUTS SEG # BLD: 2.6 K/UL (ref 1.8–9.5)
NEUTS SEG NFR BLD: 44 % (ref 40–70)
PLATELET # BLD AUTO: 202 K/UL (ref 140–440)
RBC # BLD AUTO: 2.96 M/UL (ref 4.1–5.1)
TIBC SERPL-MCNC: 239 UG/DL (ref 250–450)
WBC # BLD AUTO: 5.9 K/UL (ref 4.5–13)

## 2020-04-10 PROCEDURE — 82728 ASSAY OF FERRITIN: CPT

## 2020-04-10 PROCEDURE — 83540 ASSAY OF IRON: CPT

## 2020-04-10 PROCEDURE — 74011250636 HC RX REV CODE- 250/636: Performed by: INTERNAL MEDICINE

## 2020-04-10 PROCEDURE — 85025 COMPLETE CBC W/AUTO DIFF WBC: CPT

## 2020-04-10 PROCEDURE — 96372 THER/PROPH/DIAG INJ SC/IM: CPT

## 2020-04-10 PROCEDURE — 36415 COLL VENOUS BLD VENIPUNCTURE: CPT

## 2020-04-10 RX ADMIN — EPOETIN ALFA-EPBX 8000 UNITS: 4000 INJECTION, SOLUTION INTRAVENOUS; SUBCUTANEOUS at 14:22

## 2020-04-10 NOTE — PROGRESS NOTES
[address] [facility] Progress Note    Date: April 10, 2020    Name: Veronika Alejo    MRN: [id number]         : 1945     Retacrit injection every 2 weeks      Ms. Stern was assessed and education was provided. Patient given [employer] Care Notes on Retacrit and verbalized understanding. C/o excessive fatigue. Denies any pain. Today is kameron dose of Retacrit. Patient has dialysis fistula in left arm. Ms. Jojo Carpio vitals were reviewed and patient was observed for 5 minutes prior to treatment. Visit Vitals  /66 (BP 1 Location: Right arm, BP Patient Position: Sitting)   Pulse 69   Temp 98.3 °F (36.8 °C)   Resp 20   Ht 5' 2\" (1.575 m)   Wt 108.9 kg (240 lb)   SpO2 97%   Breastfeeding No   BMI 43.90 kg/m²     Labs (CBC, Ferritin, Iron Profile) drawn by Kristin Jarrett from right arm. Lab results were obtained and reviewed. Recent Results (from the past 12 hour(s))   CBC WITH 3 PART DIFF    Collection Time: 04/10/20  2:04 PM   Result Value Ref Range    WBC 5.9 4.5 - 13.0 K/uL    RBC 2.96 (L) 4.10 - 5.10 M/uL    HGB 9.2 (L) 12.0 - 16 g/dL    HCT 29.3 (L) 36 - 48 %    MCV 99.0 78 - 102 FL    MCH 31.1 25.0 - 35.0 PG    MCHC 31.4 31 - 37 g/dL    RDW 13.9 11.5 - 14.5 %    PLATELET 350 979 - 970 K/uL    NEUTROPHILS 44 40 - 70 %    MIXED CELLS 11 0.1 - 17 %    LYMPHOCYTES 45 (H) 14 - 44 %    ABS. NEUTROPHILS 2.6 1.8 - 9.5 K/UL    ABS. MIXED CELLS 0.6 0.0 - 2.3 K/uL    ABS. LYMPHOCYTES 2.7 1.1 - 5.9 K/UL    DF AUTOMATED       Parameters for administration not met, Hgb less than 10, Hct less than 30. Retacrit 8,000 units was administered subcutaneously in back of right upper arm. No irritation or drainage noted at site, band aid applied. Patient observed for 30 minutes, no s/s reaction noted. Ms. Boubacar Ibrahim tolerated well, and had no complaints. Patient armband removed and shredded. Ms. Boubacar Ibrahim was discharged from [place] [address] in stable condition at 1500.  She is to return on 2020 at 1400 for her next appointment for CBC and Retacrit injection.     Shyanne Reyez RN  April 10, 2020  4:22 PM

## 2020-04-13 ENCOUNTER — HOSPITAL ENCOUNTER (EMERGENCY)
Age: 75
Discharge: HOME | End: 2020-04-13
Attending: EMERGENCY MEDICINE | Admitting: EMERGENCY MEDICINE
Payer: MEDICARE

## 2020-04-13 ENCOUNTER — HOSPITAL ENCOUNTER (OUTPATIENT)
Dept: INTERVENTIONAL RADIOLOGY/VASCULAR | Age: 75
Discharge: HOME | End: 2020-04-13
Attending: PHYSICIAN ASSISTANT
Payer: MEDICARE

## 2020-04-13 ENCOUNTER — HOSPITAL ENCOUNTER (EMERGENCY)
Dept: CT IMAGING | Age: 75
Discharge: HOME | End: 2020-04-13
Attending: EMERGENCY MEDICINE
Payer: MEDICARE

## 2020-04-13 VITALS
HEART RATE: 87 BPM | HEIGHT: 62 IN | SYSTOLIC BLOOD PRESSURE: 132 MMHG | WEIGHT: 240 LBS | TEMPERATURE: 97 F | BODY MASS INDEX: 44.16 KG/M2 | RESPIRATION RATE: 18 BRPM | OXYGEN SATURATION: 99 % | DIASTOLIC BLOOD PRESSURE: 80 MMHG

## 2020-04-13 VITALS
SYSTOLIC BLOOD PRESSURE: 140 MMHG | RESPIRATION RATE: 19 BRPM | DIASTOLIC BLOOD PRESSURE: 64 MMHG | OXYGEN SATURATION: 100 % | HEART RATE: 78 BPM

## 2020-04-13 DIAGNOSIS — N39.0 ACUTE UTI: ICD-10-CM

## 2020-04-13 DIAGNOSIS — T83.022A NEPHROSTOMY TUBE DISPLACED (HCC): Primary | ICD-10-CM

## 2020-04-13 DIAGNOSIS — R10.9 RIGHT FLANK PAIN: ICD-10-CM

## 2020-04-13 LAB
ALBUMIN SERPL-MCNC: 3.4 G/DL (ref 3.4–5)
ALBUMIN/GLOB SERPL: 0.7 {RATIO} (ref 0.8–1.7)
ALP SERPL-CCNC: 129 U/L (ref 45–117)
ALT SERPL-CCNC: 14 U/L (ref 13–56)
AMORPH CRY URNS QL MICRO: ABNORMAL
ANION GAP SERPL CALC-SCNC: 6 MMOL/L (ref 3–18)
ANION GAP SERPL CALC-SCNC: 9 MMOL/L (ref 3–18)
APPEARANCE UR: ABNORMAL
APTT PPP: 33.9 SEC (ref 23–36.4)
AST SERPL-CCNC: 40 U/L (ref 10–38)
BACTERIA URNS QL MICRO: ABNORMAL /HPF
BASOPHILS # BLD: 0 K/UL (ref 0–0.1)
BASOPHILS NFR BLD: 0 % (ref 0–2)
BILIRUB SERPL-MCNC: 0.5 MG/DL (ref 0.2–1)
BILIRUB UR QL: NEGATIVE
BUN SERPL-MCNC: 51 MG/DL (ref 7–18)
BUN SERPL-MCNC: 53 MG/DL (ref 7–18)
BUN/CREAT SERPL: 13 (ref 12–20)
BUN/CREAT SERPL: 13 (ref 12–20)
CALCIUM SERPL-MCNC: 8.7 MG/DL (ref 8.5–10.1)
CALCIUM SERPL-MCNC: 9.1 MG/DL (ref 8.5–10.1)
CHLORIDE SERPL-SCNC: 112 MMOL/L (ref 100–111)
CHLORIDE SERPL-SCNC: 112 MMOL/L (ref 100–111)
CO2 SERPL-SCNC: 23 MMOL/L (ref 21–32)
CO2 SERPL-SCNC: 24 MMOL/L (ref 21–32)
COLOR UR: YELLOW
CREAT SERPL-MCNC: 3.98 MG/DL (ref 0.6–1.3)
CREAT SERPL-MCNC: 3.99 MG/DL (ref 0.6–1.3)
DIFFERENTIAL METHOD BLD: ABNORMAL
EOSINOPHIL # BLD: 0.1 K/UL (ref 0–0.4)
EOSINOPHIL NFR BLD: 2 % (ref 0–5)
ERYTHROCYTE [DISTWIDTH] IN BLOOD BY AUTOMATED COUNT: 14.9 % (ref 11.6–14.5)
GLOBULIN SER CALC-MCNC: 5 G/DL (ref 2–4)
GLUCOSE SERPL-MCNC: 106 MG/DL (ref 74–99)
GLUCOSE SERPL-MCNC: 118 MG/DL (ref 74–99)
GLUCOSE UR STRIP.AUTO-MCNC: NEGATIVE MG/DL
HCT VFR BLD AUTO: 30.5 % (ref 35–45)
HGB BLD-MCNC: 9.8 G/DL (ref 12–16)
HGB UR QL STRIP: ABNORMAL
INR PPP: 1.2 (ref 0.8–1.2)
KETONES UR QL STRIP.AUTO: NEGATIVE MG/DL
LEUKOCYTE ESTERASE UR QL STRIP.AUTO: ABNORMAL
LYMPHOCYTES # BLD: 2.4 K/UL (ref 0.9–3.6)
LYMPHOCYTES NFR BLD: 32 % (ref 21–52)
MCH RBC QN AUTO: 31.5 PG (ref 24–34)
MCHC RBC AUTO-ENTMCNC: 32.1 G/DL (ref 31–37)
MCV RBC AUTO: 98.1 FL (ref 74–97)
MONOCYTES # BLD: 0.5 K/UL (ref 0.05–1.2)
MONOCYTES NFR BLD: 7 % (ref 3–10)
NEUTS SEG # BLD: 4.4 K/UL (ref 1.8–8)
NEUTS SEG NFR BLD: 59 % (ref 40–73)
NITRITE UR QL STRIP.AUTO: NEGATIVE
PH UR STRIP: 7 [PH] (ref 5–8)
PLATELET # BLD AUTO: 210 K/UL (ref 135–420)
PMV BLD AUTO: 11.1 FL (ref 9.2–11.8)
POTASSIUM SERPL-SCNC: 5 MMOL/L (ref 3.5–5.5)
POTASSIUM SERPL-SCNC: 5.9 MMOL/L (ref 3.5–5.5)
PROT SERPL-MCNC: 8.4 G/DL (ref 6.4–8.2)
PROT UR STRIP-MCNC: 300 MG/DL
PROTHROMBIN TIME: 14.9 SEC (ref 11.5–15.2)
RBC # BLD AUTO: 3.11 M/UL (ref 4.2–5.3)
RBC #/AREA URNS HPF: ABNORMAL /HPF (ref 0–5)
SODIUM SERPL-SCNC: 142 MMOL/L (ref 136–145)
SODIUM SERPL-SCNC: 144 MMOL/L (ref 136–145)
SP GR UR REFRACTOMETRY: 1.01 (ref 1–1.03)
TROPONIN I SERPL-MCNC: <0.02 NG/ML (ref 0–0.04)
UROBILINOGEN UR QL STRIP.AUTO: 0.2 EU/DL (ref 0.2–1)
WBC # BLD AUTO: 7.4 K/UL (ref 4.6–13.2)
WBC URNS QL MICRO: ABNORMAL /HPF (ref 0–4)

## 2020-04-13 PROCEDURE — C1894 INTRO/SHEATH, NON-LASER: HCPCS

## 2020-04-13 PROCEDURE — C1769 GUIDE WIRE: HCPCS

## 2020-04-13 PROCEDURE — 81001 URINALYSIS AUTO W/SCOPE: CPT

## 2020-04-13 PROCEDURE — 99284 EMERGENCY DEPT VISIT MOD MDM: CPT

## 2020-04-13 PROCEDURE — 74011000250 HC RX REV CODE- 250: Performed by: RADIOLOGY

## 2020-04-13 PROCEDURE — 96375 TX/PRO/DX INJ NEW DRUG ADDON: CPT

## 2020-04-13 PROCEDURE — 85730 THROMBOPLASTIN TIME PARTIAL: CPT

## 2020-04-13 PROCEDURE — 87077 CULTURE AEROBIC IDENTIFY: CPT

## 2020-04-13 PROCEDURE — 96374 THER/PROPH/DIAG INJ IV PUSH: CPT

## 2020-04-13 PROCEDURE — 74011250636 HC RX REV CODE- 250/636: Performed by: PHYSICIAN ASSISTANT

## 2020-04-13 PROCEDURE — 87086 URINE CULTURE/COLONY COUNT: CPT

## 2020-04-13 PROCEDURE — 74011250636 HC RX REV CODE- 250/636: Performed by: EMERGENCY MEDICINE

## 2020-04-13 PROCEDURE — 74011250636 HC RX REV CODE- 250/636

## 2020-04-13 PROCEDURE — 85610 PROTHROMBIN TIME: CPT

## 2020-04-13 PROCEDURE — 74176 CT ABD & PELVIS W/O CONTRAST: CPT

## 2020-04-13 PROCEDURE — 50435 EXCHANGE NEPHROSTOMY CATH: CPT

## 2020-04-13 PROCEDURE — 87186 SC STD MICRODIL/AGAR DIL: CPT

## 2020-04-13 PROCEDURE — 74011636320 HC RX REV CODE- 636/320: Performed by: RADIOLOGY

## 2020-04-13 PROCEDURE — 85025 COMPLETE CBC W/AUTO DIFF WBC: CPT

## 2020-04-13 PROCEDURE — 80053 COMPREHEN METABOLIC PANEL: CPT

## 2020-04-13 PROCEDURE — 84484 ASSAY OF TROPONIN QUANT: CPT

## 2020-04-13 RX ORDER — MIDAZOLAM HYDROCHLORIDE 1 MG/ML
INJECTION, SOLUTION INTRAMUSCULAR; INTRAVENOUS
Status: COMPLETED
Start: 2020-04-13 | End: 2020-04-13

## 2020-04-13 RX ORDER — MIDAZOLAM HYDROCHLORIDE 1 MG/ML
1 INJECTION, SOLUTION INTRAMUSCULAR; INTRAVENOUS
Status: DISCONTINUED | OUTPATIENT
Start: 2020-04-13 | End: 2020-04-17 | Stop reason: HOSPADM

## 2020-04-13 RX ORDER — LIDOCAINE HYDROCHLORIDE 10 MG/ML
30 INJECTION, SOLUTION EPIDURAL; INFILTRATION; INTRACAUDAL; PERINEURAL ONCE
Status: COMPLETED | OUTPATIENT
Start: 2020-04-13 | End: 2020-04-13

## 2020-04-13 RX ORDER — ONDANSETRON 2 MG/ML
4 INJECTION INTRAMUSCULAR; INTRAVENOUS
Status: COMPLETED | OUTPATIENT
Start: 2020-04-13 | End: 2020-04-13

## 2020-04-13 RX ORDER — MORPHINE SULFATE 4 MG/ML
4 INJECTION INTRAVENOUS
Status: DISCONTINUED | OUTPATIENT
Start: 2020-04-13 | End: 2020-04-13 | Stop reason: CLARIF

## 2020-04-13 RX ORDER — MORPHINE SULFATE 4 MG/ML
4 INJECTION, SOLUTION INTRAMUSCULAR; INTRAVENOUS
Status: COMPLETED | OUTPATIENT
Start: 2020-04-13 | End: 2020-04-13

## 2020-04-13 RX ORDER — CEFAZOLIN SODIUM 2 G/50ML
2 SOLUTION INTRAVENOUS ONCE
Status: COMPLETED | OUTPATIENT
Start: 2020-04-13 | End: 2020-04-13

## 2020-04-13 RX ORDER — CEFAZOLIN SODIUM 2 G/50ML
2 SOLUTION INTRAVENOUS ONCE
Status: DISCONTINUED | OUTPATIENT
Start: 2020-04-13 | End: 2020-04-13 | Stop reason: SDUPTHER

## 2020-04-13 RX ORDER — FENTANYL CITRATE 50 UG/ML
INJECTION, SOLUTION INTRAMUSCULAR; INTRAVENOUS
Status: COMPLETED
Start: 2020-04-13 | End: 2020-04-13

## 2020-04-13 RX ORDER — FENTANYL CITRATE 50 UG/ML
12.5-5 INJECTION, SOLUTION INTRAMUSCULAR; INTRAVENOUS
Status: DISCONTINUED | OUTPATIENT
Start: 2020-04-13 | End: 2020-04-17 | Stop reason: HOSPADM

## 2020-04-13 RX ORDER — SULFAMETHOXAZOLE AND TRIMETHOPRIM 800; 160 MG/1; MG/1
1 TABLET ORAL 2 TIMES DAILY
Qty: 12 TAB | Refills: 0 | Status: SHIPPED | OUTPATIENT
Start: 2020-04-13 | End: 2020-04-19

## 2020-04-13 RX ADMIN — MORPHINE SULFATE 4 MG: 4 INJECTION, SOLUTION INTRAMUSCULAR; INTRAVENOUS at 11:42

## 2020-04-13 RX ADMIN — FENTANYL CITRATE 25 MCG: 50 INJECTION INTRAMUSCULAR; INTRAVENOUS at 14:45

## 2020-04-13 RX ADMIN — FENTANYL CITRATE 50 MCG: 50 INJECTION INTRAMUSCULAR; INTRAVENOUS at 14:35

## 2020-04-13 RX ADMIN — CEFAZOLIN SODIUM 2 G: 2 SOLUTION INTRAVENOUS at 14:30

## 2020-04-13 RX ADMIN — LIDOCAINE HYDROCHLORIDE 30 ML: 10 INJECTION, SOLUTION EPIDURAL; INFILTRATION; INTRACAUDAL; PERINEURAL at 14:35

## 2020-04-13 RX ADMIN — ONDANSETRON 4 MG: 2 INJECTION INTRAMUSCULAR; INTRAVENOUS at 11:42

## 2020-04-13 RX ADMIN — MIDAZOLAM 0.5 MG: 1 INJECTION INTRAMUSCULAR; INTRAVENOUS at 14:55

## 2020-04-13 RX ADMIN — MIDAZOLAM 1 MG: 1 INJECTION INTRAMUSCULAR; INTRAVENOUS at 14:35

## 2020-04-13 RX ADMIN — MIDAZOLAM 0.5 MG: 1 INJECTION INTRAMUSCULAR; INTRAVENOUS at 14:45

## 2020-04-13 RX ADMIN — IOHEXOL 50 ML: 300 INJECTION, SOLUTION INTRAVENOUS at 14:54

## 2020-04-13 RX ADMIN — FENTANYL CITRATE 25 MCG: 50 INJECTION INTRAMUSCULAR; INTRAVENOUS at 14:55

## 2020-04-13 NOTE — CONSULTS
Consult Note    Patient: Francine Tom               Sex: female          DOA: 4/13/2020         YOB: 1945      Age:  66 y.o.        LOS:  LOS: 0 days              HPI:     Francine Tom is a 66 y.o. female who has been seen in evaluation of dislodged nephrostomy tube at the request of NOVA Coyle. Patient has a history of CKD 5, recurrent UTIs s/p right PCN (last exchanged on 2/21), HTN, DM2 and anemia who presented to the ED with reports of decreased drainage from her right PCN. CT abdomen/pelvis showed the PCN no longer positioned within the right kidney with associated right-sided moderate hydroureteronephrosis. Past Medical History:   Diagnosis Date    Acidosis     Anemia     Arteriovenous fistula (HCC)     CKD (chronic kidney disease)     HLD (hyperlipidemia)     HTN (hypertension)     Hyperparathyroidism due to renal insufficiency (HCC)     Hypothyroid     Kidney stone     Lung mass     Recurrent UTI     Ureter, stricture     Uric acid nephrolithiasis     Urinary incontinence        Past Surgical History:   Procedure Laterality Date    HX APPENDECTOMY      HX CHOLECYSTECTOMY      HX GASTRIC BYPASS      HX KNEE ARTHROSCOPY      HX UROLOGICAL      right PCN placement    HX UROLOGICAL  07/23/2018    RIGHT URETEROSCOPY WITH HOLMIUM LASER    IR EXCHANGE NEPHRO PERC LT SI  2/21/2020       No family history on file. Social History     Socioeconomic History    Marital status: SINGLE     Spouse name: Not on file    Number of children: Not on file    Years of education: Not on file    Highest education level: Not on file   Tobacco Use    Smoking status: Never Smoker    Smokeless tobacco: Never Used   Substance and Sexual Activity    Alcohol use: Never     Frequency: Never    Drug use: Never       Prior to Admission medications    Medication Sig Start Date End Date Taking?  Authorizing Provider   fluticasone propionate (FLONASE) 50 mcg/actuation nasal spray 2 sprays in each nostril daily 2/25/20   Tai Aldrich MD   lactobacillus sp. 50 billion cpu (BIO-K PLUS) 50 billion cell -375 mg cap capsule Take 1 Cap by mouth daily. 2/25/20   Tai Aldrich MD   tamsulosin (FLOMAX) 0.4 mg capsule Take 1 Cap by mouth daily. 2/25/20   Tai Aldrich MD   sodium bicarbonate 650 mg tablet Take 2 Tabs by mouth three (3) times daily. Indications: excess body acid 2/24/20   Tai Aldrich MD   acetaminophen (TYLENOL) 325 mg tablet Take 650 mg by mouth two (2) times a day. Lexus Hogan MD   allopurinoL (ZYLOPRIM) 100 mg tablet Take 200 mg by mouth daily. Lexus Hogan MD   amLODIPine (NORVASC) 5 mg tablet Take 5 mg by mouth daily. Lexus Hogan MD   ascorbic acid, vitamin C, (VITAMIN C) 500 mg tablet Take 500 mg by mouth. Lexus Hogan MD   calcitRIOL (ROCALTROL) 0.25 mcg capsule Take 0.25 mcg by mouth every other day. Lexus Hogan MD   carvediloL (COREG) 12.5 mg tablet Take  by mouth two (2) times daily (with meals). Lexus Hogan MD   cholecalciferol (VITAMIN D3) (2,000 UNITS /50 MCG) cap capsule Take 2,000 Units by mouth two (2) times a day. Take two tabs a total of 4000 units    Lexus Hogan MD   cyclobenzaprine (FLEXERIL) 5 mg tablet Take 5 mg by mouth daily. Lexus Hogan MD   latanoprost (XALATAN) 0.005 % ophthalmic solution Administer 1 Drop to both eyes nightly. One drop at bedtime    Lexus Hogan MD   levothyroxine (SYNTHROID) 125 mcg tablet Take 125 mcg by mouth Daily (before breakfast). Lexus Hogan MD   omeprazole (PRILOSEC) 20 mg capsule Take 20 mg by mouth daily. Lexus Hogan MD   ondansetron (ZOFRAN ODT) 4 mg disintegrating tablet Take 4 mg by mouth every eight (8) hours as needed for Nausea or Vomiting. Lexus Hogan MD   vit B Cmplx 3-FA-Vit C-Biotin (NEPHRO HOWIE RX) 1- mg-mg-mcg tablet Take 1 Tab by mouth daily.     Lexus Hogan MD       Allergies   Allergen Reactions    Ciprofloxacin Hives    Statins-Hmg-Coa Reductase Inhibitors Other (comments)     Body ache       Review of Systems  Pertinent items are noted in the History of Present Illness. Physical Exam:      Visit Vitals  /60   Pulse 87   Temp 97 °F (36.1 °C)   Resp 18   Ht 5' 2\" (1.575 m)   Wt 108.9 kg (240 lb)   SpO2 100%   BMI 43.90 kg/m²       Physical Exam:  Constitutional: A&Ox4. NAD. Resting comfortably on stretcher. Respiratory: Normal respiratory effort. Symmetrical rise and fall of chest.    Cardiovascular: Regular rate. Gastrointestinal: Soft, NT, ND.    : Right PCN in place but pulled back. No urine in collection bag. Labs Reviewed:  CMP:   Lab Results   Component Value Date/Time     04/13/2020 11:40 AM    K 5.0 04/13/2020 11:40 AM     (H) 04/13/2020 11:40 AM    CO2 24 04/13/2020 11:40 AM    AGAP 6 04/13/2020 11:40 AM     (H) 04/13/2020 11:40 AM    BUN 51 (H) 04/13/2020 11:40 AM    CREA 3.98 (H) 04/13/2020 11:40 AM    GFRAA 13 (L) 04/13/2020 11:40 AM    GFRNA 11 (L) 04/13/2020 11:40 AM    CA 8.7 04/13/2020 11:40 AM    ALB 3.4 04/13/2020 09:45 AM    TP 8.4 (H) 04/13/2020 09:45 AM    GLOB 5.0 (H) 04/13/2020 09:45 AM    AGRAT 0.7 (L) 04/13/2020 09:45 AM    SGOT 40 (H) 04/13/2020 09:45 AM    ALT 14 04/13/2020 09:45 AM     CBC:   Lab Results   Component Value Date/Time    WBC 7.4 04/13/2020 09:45 AM    HGB 9.8 (L) 04/13/2020 09:45 AM    HCT 30.5 (L) 04/13/2020 09:45 AM     04/13/2020 09:45 AM     COAGS:   Lab Results   Component Value Date/Time    APTT 33.9 04/13/2020 10:04 AM    PTP 14.9 04/13/2020 10:04 AM    INR 1.2 04/13/2020 10:04 AM       Imaging:  EXAM: CT of the Abdomen and Pelvis without IV contrast     CLINICAL INDICATION:  right flank pain     TECHNIQUE: CT of the abdomen and pelvis.  Sagittal and coronal reformations     All CT scans at this facility are performed using dose optimization technique as  appropriate to a performed exam, to include automated exposure control,  adjustment of the mA and/or kV according to patient size (including appropriate  matching for site specific examination) or use of iterative reconstruction  technique. IV CONTRAST: None     ENTERIC CONTRAST: None     COMPARISON: 2/20/2020     FINDINGS:   Limitations: The evaluation of the solid organs is limited due to the lack of IV  contrast.     Lower Chest: No acute infiltrate or effusion appreciated. The heart and  pericardium are unremarkable.     Peritoneum: No free air appreciated. No free fluid present. No fluid collections  present.     Liver: No focal lesion appreciated.     Biliary/Gallbladder: No intrahepatic or extrahepatic biliary ductal dilatation  appreciated. The gallbladder surgically absent.     Spleen: The spleen is enlarged measuring 13.2 x 7.2 x 12.8 cm.     Pancreas: No focal lesion appreciated. The pancreatic duct is unremarkable. No  peripancreatic inflammation or adenopathy.     : The adrenal glands are unremarkable. The right-sided nephrostomy tube is  retracted and now has its tip in the posterior right pararenal space. Mild  right-sided perinephric fat stranding is noted. Moderate right hydronephrosis  and hydroureter is present. The left kidney is atrophic with multiple cysts  similar to prior imaging. No hydronephrosis of the left collecting system. The  bladder has a thickened wall which was seen previously.     GI: Multiple surgical clips are noted in the stomach. This was present  previously. The small bowel is without evidence of obstruction. No small bowel  wall thickening. The mesentery is without inflammation or adenopathy. The  appendix is not definitively identified. No pericolonic inflammation or wall  thickening appreciated.     Aorta and retroperitoneum: No aortic aneurysm seen. No periaortic adenopathy  seen. No fluid collections in the retroperitoneum appreciated.     Abdominal wall/Inguinal: Unremarkable      Musculoskeletal: Altered level degenerative disc disease and facet arthropathy.   Bilateral hip osteoarthritis is noted.     IMPRESSION  IMPRESSION:   1. The nephrostomy tube is no longer positioned within the right kidney with  right-sided moderate hydroureteronephrosis.     Finding discussed with the ER physician at 1210 hours on 4/13/2020. Assessment   Active Problems:    * No active hospital problems. *      Jonna Fatima is a 66 y.o. female with a history of recurrent UTIS s/p previously placed nephrostomy tube that is now dislodged. Plan   Case and images reviewed by Dr. Almita Turcios. The best management option to preserve the PCN tract is replacement of the tube under image guidance. Given that the time in which the tube was dislodged is unknown,  will plan for image-guided nephrostomy tube replacement with possible moderate sedation as IR schedule allows. Patient to remain NPO with blood thinning medications held. All orders placed. Consent to be obtained prior to procedure.

## 2020-04-13 NOTE — PROCEDURES
RADIOLOGY POST PROCEDURE NOTE     April 13, 2020       3:38 PM     Preoperative Diagnosis:   Right PCN displaced. Right hydronephrosis. Postoperative Diagnosis:  Same. :  Dr. Saima Young    Assistant:  None. Type of Anesthesia: 1% plain lidocaine and IV moderate sedation with Versed and Fentanyl. Procedure/Description:  Image guided right PCN placement. Findings:   Old PCN is completely displaced therefore new R PCN was placed. Estimated blood Loss:  Minimal    Specimen Removed:   no    Blood transfusions:  None. Implants:  10F Right PCN Resolve to gravity.     Complications: None    Condition: Stable    Discharge Plan:  continue present therapy    Uma Salazar MD

## 2020-04-13 NOTE — DISCHARGE INSTRUCTIONS
Patient Education        Flank Pain: Care Instructions  Your Care Instructions  Flank pain is pain on the side of the back just below the rib cage and above the waist. It can be on one or both sides. Flank pain has many possible causes, including a kidney stone, a urinary tract infection, or back strain. Flank pain may get better on its own. But don't ignore new symptoms, such as fever, nausea and vomiting, urination problems, pain that gets worse, and dizziness. These may be signs of a more serious problem. You may have to have tests or other treatment. Follow-up care is a key part of your treatment and safety. Be sure to make and go to all appointments, and call your doctor if you are having problems. It's also a good idea to know your test results and keep a list of the medicines you take. How can you care for yourself at home? · Rest until you feel better. · Take pain medicines exactly as directed. ? If the doctor gave you a prescription medicine for pain, take it as prescribed. ? If you are not taking a prescription pain medicine, ask your doctor if you can take an over-the-counter pain medicine, such as acetaminophen (Tylenol), ibuprofen (Advil, Motrin), or naproxen (Aleve). Read and follow all instructions on the label. · If your doctor prescribed antibiotics, take them as directed. Do not stop taking them just because you feel better. You need to take the full course of antibiotics. · To apply heat, put a warm water bottle, a heating pad set on low, or a warm cloth on the painful area. Do not go to sleep with a heating pad on your skin. · To prevent dehydration, drink plenty of fluids, enough so that your urine is light yellow or clear like water. Choose water and other caffeine-free clear liquids until you feel better. If you have kidney, heart, or liver disease and have to limit fluids, talk with your doctor before you increase the amount of fluids you drink. When should you call for help?   Call your doctor now or seek immediate medical care if:    · Your flank pain gets worse.     · You have new symptoms, such as fever, nausea, or vomiting.     · You have symptoms of a urinary problem. For example:  ? You have blood or pus in your urine. ? You have chills or body aches. ? It hurts to urinate. ? You have groin or belly pain.    Watch closely for changes in your health, and be sure to contact your doctor if you do not get better as expected. Where can you learn more? Go to http://jasmin-todd.info/  Enter S191 in the search box to learn more about \"Flank Pain: Care Instructions. \"  Current as of: June 26, 2019Content Version: 12.4  © 0549-2619 Healthwise, [employer]. Care instructions adapted under license by [employer] (which disclaims liability or warranty for this information). If you have questions about a medical condition or this instruction, always ask your healthcare professional. [place] [address] any warranty or liability for your use of this information. Patient Education        Urinary Tract Infection in Women: Care Instructions  Your Care Instructions    A urinary tract infection, or UTI, is a general term for an infection anywhere between the kidneys and the urethra (where urine comes out). Most UTIs are bladder infections. They often cause pain or burning when you urinate. UTIs are caused by bacteria and can be cured with antibiotics. Be sure to complete your treatment so that the infection goes away. Follow-up care is a key part of your treatment and safety. Be sure to make and go to all appointments, and call your doctor if you are having problems. It's also a good idea to know your test results and keep a list of the medicines you take. How can you care for yourself at home? · Take your antibiotics as directed. Do not stop taking them just because you feel better. You need to take the full course of antibiotics.   · Drink extra water and other fluids for the next day or two. This may help wash out the bacteria that are causing the infection. (If you have kidney, heart, or liver disease and have to limit fluids, talk with your doctor before you increase your fluid intake.)  · Avoid drinks that are carbonated or have caffeine. They can irritate the bladder. · Urinate often. Try to empty your bladder each time. · To relieve pain, take a hot bath or lay a heating pad set on low over your lower belly or genital area. Never go to sleep with a heating pad in place. To prevent UTIs  · Drink plenty of water each day. This helps you urinate often, which clears bacteria from your system. (If you have kidney, heart, or liver disease and have to limit fluids, talk with your doctor before you increase your fluid intake.)  · Urinate when you need to. · Urinate right after you have sex. · Change sanitary pads often. · Avoid douches, bubble baths, feminine hygiene sprays, and other feminine hygiene products that have deodorants. · After going to the bathroom, wipe from front to back. When should you call for help? Call your doctor now or seek immediate medical care if:    · Symptoms such as fever, chills, nausea, or vomiting get worse or appear for the first time.     · You have new pain in your back just below your rib cage. This is called flank pain.     · There is new blood or pus in your urine.     · You have any problems with your antibiotic medicine.    Watch closely for changes in your health, and be sure to contact your doctor if:    · You are not getting better after taking an antibiotic for 2 days.     · Your symptoms go away but then come back. Where can you learn more? Go to http://jasmin-todd.info/  Enter R196 in the search box to learn more about \"Urinary Tract Infection in Women: Care Instructions. \"  Current as of: August 21, 2019Content Version: 12.4  © 9032-3521 Healthwise, [facility].   Care instructions adapted under license by [employer] (which disclaims liability or warranty for this information). If you have questions about a medical condition or this instruction, always ask your healthcare professional. [address] any warranty or liability for your use of this information.

## 2020-04-13 NOTE — ED TRIAGE NOTES
Patient has a fistula in the upper left arm. Has not started dialysis at this time. Patient did not take any of her medications this morning.

## 2020-04-13 NOTE — ED TRIAGE NOTES
Patient arrived via triage stating \"I have a nephrostomy tube on my right side and it is not draining. The pain had gotten worse. \" Patient stated that it stopped draining around 1am this morning.

## 2020-04-13 NOTE — ED PROVIDER NOTES
EMERGENCY DEPARTMENT HISTORY AND PHYSICAL EXAM    Date: 4/13/2020  Patient Name: Justina Mcgarry    History of Presenting Illness     Chief Complaint   Patient presents with    Flank Pain         History Provided By: Patient    Additional History (Context): Justina Mcgarry is a 66 y.o. female with hypertension, obesity and CKD, ureteral stricture, kidney stone who presents with right flank pain since 1 AM when she noticed when she toileted that there was very little urine output in her right nephrostomy tube bag. 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 She had a change last month here with IR. Dr. Dipak Chapa is her nephrologist and she was to establish care with a urologist here on the 22nd of this month. She moved here from [place] with her family in December. She denies fever or cough. Last p.o. was last night. Not on anticoagulation. Took Tylenol at 2 AM.    PCP: Holli Sprague MD    Current Outpatient Medications   Medication Sig Dispense Refill    trimethoprim-sulfamethoxazole (Bactrim DS) 160-800 mg per tablet Take 1 Tab by mouth two (2) times a day for 6 days. 12 Tab 0    fluticasone propionate (FLONASE) 50 mcg/actuation nasal spray 2 sprays in each nostril daily 1 Bottle 1    lactobacillus sp. 50 billion cpu (BIO-K PLUS) 50 billion cell -375 mg cap capsule Take 1 Cap by mouth daily. 30 Cap 2    tamsulosin (FLOMAX) 0.4 mg capsule Take 1 Cap by mouth daily. 90 Cap 3    sodium bicarbonate 650 mg tablet Take 2 Tabs by mouth three (3) times daily. Indications: excess body acid 30 Tab 1    acetaminophen (TYLENOL) 325 mg tablet Take 650 mg by mouth two (2) times a day.  allopurinoL (ZYLOPRIM) 100 mg tablet Take 200 mg by mouth daily.  amLODIPine (NORVASC) 5 mg tablet Take 5 mg by mouth daily.  ascorbic acid, vitamin C, (VITAMIN C) 500 mg tablet Take 500 mg by mouth.       calcitRIOL (ROCALTROL) 0.25 mcg capsule Take 0.25 mcg by mouth every other day.  carvediloL (COREG) 12.5 mg tablet Take  by mouth two (2) times daily (with meals).  cholecalciferol (VITAMIN D3) (2,000 UNITS /50 MCG) cap capsule Take 2,000 Units by mouth two (2) times a day. Take two tabs a total of 4000 units      cyclobenzaprine (FLEXERIL) 5 mg tablet Take 5 mg by mouth daily.  latanoprost (XALATAN) 0.005 % ophthalmic solution Administer 1 Drop to both eyes nightly. One drop at bedtime      levothyroxine (SYNTHROID) 125 mcg tablet Take 125 mcg by mouth Daily (before breakfast).  omeprazole (PRILOSEC) 20 mg capsule Take 20 mg by mouth daily.  ondansetron (ZOFRAN ODT) 4 mg disintegrating tablet Take 4 mg by mouth every eight (8) hours as needed for Nausea or Vomiting.  vit B Cmplx 3-FA-Vit C-Biotin (NEPHRO HOWIE RX) 1- mg-mg-mcg tablet Take 1 Tab by mouth daily.        Facility-Administered Medications Ordered in Other Encounters   Medication Dose Route Frequency Provider Last Rate Last Dose    midazolam (VERSED) injection 1 mg  1 mg IntraVENous Multiple Deja Rivera PA   0.5 mg at 04/13/20 1455    fentaNYL citrate (PF) injection 12.5-50 mcg  12.5-50 mcg IntraVENous Multiple Melany Wade   25 mcg at 04/13/20 1455       Past History     Past Medical History:  Past Medical History:   Diagnosis Date    Acidosis     Anemia     Arteriovenous fistula (HCC)     CKD (chronic kidney disease)     HLD (hyperlipidemia)     HTN (hypertension)     Hyperparathyroidism due to renal insufficiency (HCC)     Hypothyroid     Kidney stone     Lung mass     Recurrent UTI     Ureter, stricture     Uric acid nephrolithiasis     Urinary incontinence        Past Surgical History:  Past Surgical History:   Procedure Laterality Date    HX APPENDECTOMY      HX CHOLECYSTECTOMY      HX GASTRIC BYPASS      HX KNEE ARTHROSCOPY      HX UROLOGICAL      right PCN placement    HX UROLOGICAL  07/23/2018    RIGHT URETEROSCOPY WITH HOLMIUM LASER    IR EXCHANGE NEPHRO PERC LT SI  2/21/2020       Family History:  No family history on file. Social History:  Social History     Tobacco Use    Smoking status: Never Smoker    Smokeless tobacco: Never Used   Substance Use Topics    Alcohol use: Never     Frequency: Never    Drug use: Never       Allergies: Allergies   Allergen Reactions    Ciprofloxacin Hives    Statins-Hmg-Coa Reductase Inhibitors Other (comments)     Body ache         Review of Systems   Review of Systems   Constitutional: Negative for fever. Respiratory: Negative for cough and shortness of breath. Gastrointestinal: Negative for abdominal pain, nausea and vomiting. Genitourinary: Positive for difficulty urinating and flank pain. All Other Systems Negative  Physical Exam     Vitals:    04/13/20 1145 04/13/20 1542 04/13/20 1544 04/13/20 1545   BP: 139/60 131/75  132/80   Pulse: 87      Resp: 18      Temp:       SpO2: 100%  100% 99%   Weight:       Height:         Physical Exam  Vitals signs and nursing note reviewed. Constitutional:       General: She is in acute distress. Appearance: She is well-developed. She is obese. HENT:      Head: Normocephalic and atraumatic. Right Ear: External ear normal.      Left Ear: External ear normal.      Nose: Nose normal.   Eyes:      Conjunctiva/sclera: Conjunctivae normal.      Pupils: Pupils are equal, round, and reactive to light. Neck:      Musculoskeletal: Normal range of motion and neck supple. Vascular: No JVD. Trachea: No tracheal deviation. Cardiovascular:      Rate and Rhythm: Normal rate and regular rhythm. Heart sounds: Normal heart sounds. No murmur. No friction rub. No gallop. Pulmonary:      Effort: Pulmonary effort is normal. No respiratory distress. Breath sounds: Normal breath sounds. No wheezing or rales. Abdominal:      General: Bowel sounds are normal. There is no distension. Palpations: Abdomen is soft. There is no mass. Tenderness: There is no abdominal tenderness. There is right CVA tenderness. There is no guarding or rebound. Musculoskeletal: Normal range of motion. General: No tenderness. Skin:     General: Skin is warm and dry. Findings: No rash. Neurological:      Mental Status: She is alert and oriented to person, place, and time. Cranial Nerves: No cranial nerve deficit. Deep Tendon Reflexes: Reflexes are normal and symmetric. Psychiatric:         Behavior: Behavior normal.          Diagnostic Study Results     Labs -     Recent Results (from the past 12 hour(s))   CBC WITH AUTOMATED DIFF    Collection Time: 04/13/20  9:45 AM   Result Value Ref Range    WBC 7.4 4.6 - 13.2 K/uL    RBC 3.11 (L) 4.20 - 5.30 M/uL    HGB 9.8 (L) 12.0 - 16.0 g/dL    HCT 30.5 (L) 35.0 - 45.0 %    MCV 98.1 (H) 74.0 - 97.0 FL    MCH 31.5 24.0 - 34.0 PG    MCHC 32.1 31.0 - 37.0 g/dL    RDW 14.9 (H) 11.6 - 14.5 %    PLATELET 764 513 - 302 K/uL    MPV 11.1 9.2 - 11.8 FL    NEUTROPHILS 59 40 - 73 %    LYMPHOCYTES 32 21 - 52 %    MONOCYTES 7 3 - 10 %    EOSINOPHILS 2 0 - 5 %    BASOPHILS 0 0 - 2 %    ABS. NEUTROPHILS 4.4 1.8 - 8.0 K/UL    ABS. LYMPHOCYTES 2.4 0.9 - 3.6 K/UL    ABS. MONOCYTES 0.5 0.05 - 1.2 K/UL    ABS. EOSINOPHILS 0.1 0.0 - 0.4 K/UL    ABS.  BASOPHILS 0.0 0.0 - 0.1 K/UL    DF AUTOMATED     METABOLIC PANEL, COMPREHENSIVE    Collection Time: 04/13/20  9:45 AM   Result Value Ref Range    Sodium 144 136 - 145 mmol/L    Potassium 5.9 (H) 3.5 - 5.5 mmol/L    Chloride 112 (H) 100 - 111 mmol/L    CO2 23 21 - 32 mmol/L    Anion gap 9 3.0 - 18 mmol/L    Glucose 118 (H) 74 - 99 mg/dL    BUN 53 (H) 7.0 - 18 MG/DL    Creatinine 3.99 (H) 0.6 - 1.3 MG/DL    BUN/Creatinine ratio 13 12 - 20      GFR est AA 13 (L) >60 ml/min/1.73m2    GFR est non-AA 11 (L) >60 ml/min/1.73m2    Calcium 9.1 8.5 - 10.1 MG/DL    Bilirubin, total 0.5 0.2 - 1.0 MG/DL    ALT (SGPT) 14 13 - 56 U/L    AST (SGOT) 40 (H) 10 - 38 U/L    Alk. phosphatase 129 (H) 45 - 117 U/L    Protein, total 8.4 (H) 6.4 - 8.2 g/dL    Albumin 3.4 3.4 - 5.0 g/dL    Globulin 5.0 (H) 2.0 - 4.0 g/dL    A-G Ratio 0.7 (L) 0.8 - 1.7     TROPONIN I    Collection Time: 04/13/20  9:45 AM   Result Value Ref Range    Troponin-I, QT <0.02 0.0 - 0.045 NG/ML   PROTHROMBIN TIME + INR    Collection Time: 04/13/20 10:04 AM   Result Value Ref Range    Prothrombin time 14.9 11.5 - 15.2 sec    INR 1.2 0.8 - 1.2     PTT    Collection Time: 04/13/20 10:04 AM   Result Value Ref Range    aPTT 33.9 23.0 - 68.0 SEC   METABOLIC PANEL, BASIC    Collection Time: 04/13/20 11:40 AM   Result Value Ref Range    Sodium 142 136 - 145 mmol/L    Potassium 5.0 3.5 - 5.5 mmol/L    Chloride 112 (H) 100 - 111 mmol/L    CO2 24 21 - 32 mmol/L    Anion gap 6 3.0 - 18 mmol/L    Glucose 106 (H) 74 - 99 mg/dL    BUN 51 (H) 7.0 - 18 MG/DL    Creatinine 3.98 (H) 0.6 - 1.3 MG/DL    BUN/Creatinine ratio 13 12 - 20      GFR est AA 13 (L) >60 ml/min/1.73m2    GFR est non-AA 11 (L) >60 ml/min/1.73m2    Calcium 8.7 8.5 - 10.1 MG/DL   URINALYSIS W/ RFLX MICROSCOPIC    Collection Time: 04/13/20 11:55 AM   Result Value Ref Range    Color YELLOW      Appearance TURBID      Specific gravity 1.014 1.005 - 1.030      pH (UA) 7.0 5.0 - 8.0      Protein 300 (A) NEG mg/dL    Glucose Negative NEG mg/dL    Ketone Negative NEG mg/dL    Bilirubin Negative NEG      Blood LARGE (A) NEG      Urobilinogen 0.2 0.2 - 1.0 EU/dL    Nitrites Negative NEG      Leukocyte Esterase LARGE (A) NEG     URINE MICROSCOPIC ONLY    Collection Time: 04/13/20 11:55 AM   Result Value Ref Range    WBC TOO NUMEROUS TO COUNT 0 - 4 /hpf    RBC 4 to 9 0 - 5 /hpf    Bacteria FEW (A) NEG /hpf    Amorphous Crystals 3+ (A) NEG       Radiologic Studies -   CT ABD PELV WO CONT   Final Result   IMPRESSION:    1. The nephrostomy tube is no longer positioned within the right kidney with   right-sided moderate hydroureteronephrosis.       Finding discussed with the ER physician at 1210 hours on 4/13/2020. IR EXCHANGE NEPHRO PERC RT SI    (Results Pending)     CT Results  (Last 48 hours)               04/13/20 1126  CT ABD PELV WO CONT Final result    Impression:  IMPRESSION:    1. The nephrostomy tube is no longer positioned within the right kidney with   right-sided moderate hydroureteronephrosis. Finding discussed with the ER physician at 1210 hours on 4/13/2020. Narrative:  EXAM: CT of the Abdomen and Pelvis without IV contrast       CLINICAL INDICATION:  right flank pain       TECHNIQUE: CT of the abdomen and pelvis. Sagittal and coronal reformations       All CT scans at this facility are performed using dose optimization technique as   appropriate to a performed exam, to include automated exposure control,   adjustment of the mA and/or kV according to patient size (including appropriate   matching for site specific examination) or use of iterative reconstruction   technique. IV CONTRAST: None       ENTERIC CONTRAST: None       COMPARISON: 2/20/2020       FINDINGS:    Limitations: The evaluation of the solid organs is limited due to the lack of IV   contrast.       Lower Chest: No acute infiltrate or effusion appreciated. The heart and   pericardium are unremarkable. Peritoneum: No free air appreciated. No free fluid present. No fluid collections   present. Liver: No focal lesion appreciated. Biliary/Gallbladder: No intrahepatic or extrahepatic biliary ductal dilatation   appreciated. The gallbladder surgically absent. Spleen: The spleen is enlarged measuring 13.2 x 7.2 x 12.8 cm. Pancreas: No focal lesion appreciated. The pancreatic duct is unremarkable. No   peripancreatic inflammation or adenopathy. : The adrenal glands are unremarkable. The right-sided nephrostomy tube is   retracted and now has its tip in the posterior right pararenal space.  Mild   right-sided perinephric fat stranding is noted. Moderate right hydronephrosis   and hydroureter is present. The left kidney is atrophic with multiple cysts   similar to prior imaging. No hydronephrosis of the left collecting system. The   bladder has a thickened wall which was seen previously. GI: Multiple surgical clips are noted in the stomach. This was present   previously. The small bowel is without evidence of obstruction. No small bowel   wall thickening. The mesentery is without inflammation or adenopathy. The   appendix is not definitively identified. No pericolonic inflammation or wall   thickening appreciated. Aorta and retroperitoneum: No aortic aneurysm seen. No periaortic adenopathy   seen. No fluid collections in the retroperitoneum appreciated. Abdominal wall/Inguinal: Unremarkable        Musculoskeletal: Altered level degenerative disc disease and facet arthropathy. Bilateral hip osteoarthritis is noted. CXR Results  (Last 48 hours)    None            Medical Decision Making   I am the first provider for this patient. I reviewed the vital signs, available nursing notes, past medical history, past surgical history, family history and social history. Vital Signs-Reviewed the patient's vital signs. Records Reviewed: Nursing Notes    Procedures:  Procedures    Provider Notes (Medical Decision Making): She is back from having her nephrostomy tube replaced and is feeling much better. Noticeable relief. We will send her prescription of Bactrim for her UTI to preferred pharmacy. We will have her follow-up with urology. MED RECONCILIATION:  No current facility-administered medications for this encounter. Current Outpatient Medications   Medication Sig    trimethoprim-sulfamethoxazole (Bactrim DS) 160-800 mg per tablet Take 1 Tab by mouth two (2) times a day for 6 days.     fluticasone propionate (FLONASE) 50 mcg/actuation nasal spray 2 sprays in each nostril daily    lactobacillus sp. 50 billion cpu (BIO-K PLUS) 50 billion cell -375 mg cap capsule Take 1 Cap by mouth daily.  tamsulosin (FLOMAX) 0.4 mg capsule Take 1 Cap by mouth daily.  sodium bicarbonate 650 mg tablet Take 2 Tabs by mouth three (3) times daily. Indications: excess body acid    acetaminophen (TYLENOL) 325 mg tablet Take 650 mg by mouth two (2) times a day.  allopurinoL (ZYLOPRIM) 100 mg tablet Take 200 mg by mouth daily.  amLODIPine (NORVASC) 5 mg tablet Take 5 mg by mouth daily.  ascorbic acid, vitamin C, (VITAMIN C) 500 mg tablet Take 500 mg by mouth.  calcitRIOL (ROCALTROL) 0.25 mcg capsule Take 0.25 mcg by mouth every other day.  carvediloL (COREG) 12.5 mg tablet Take  by mouth two (2) times daily (with meals).  cholecalciferol (VITAMIN D3) (2,000 UNITS /50 MCG) cap capsule Take 2,000 Units by mouth two (2) times a day. Take two tabs a total of 4000 units    cyclobenzaprine (FLEXERIL) 5 mg tablet Take 5 mg by mouth daily.  latanoprost (XALATAN) 0.005 % ophthalmic solution Administer 1 Drop to both eyes nightly. One drop at bedtime    levothyroxine (SYNTHROID) 125 mcg tablet Take 125 mcg by mouth Daily (before breakfast).  omeprazole (PRILOSEC) 20 mg capsule Take 20 mg by mouth daily.  ondansetron (ZOFRAN ODT) 4 mg disintegrating tablet Take 4 mg by mouth every eight (8) hours as needed for Nausea or Vomiting.  vit B Cmplx 3-FA-Vit C-Biotin (NEPHRO HOWIE RX) 1- mg-mg-mcg tablet Take 1 Tab by mouth daily. Facility-Administered Medications Ordered in Other Encounters   Medication Dose Route Frequency    midazolam (VERSED) injection 1 mg  1 mg IntraVENous Multiple    fentaNYL citrate (PF) injection 12.5-50 mcg  12.5-50 mcg IntraVENous Multiple       Disposition:  home    DISCHARGE NOTE:   4:02 PM    Pt has been reexamined. Patient has no new complaints, changes, or physical findings. Care plan outlined and precautions discussed. Results of labs, CT were reviewed with the patient. All medications were reviewed with the patient; will d/c home with bactrim ds. All of pt's questions and concerns were addressed. Patient was instructed and agrees to follow up with urology, as well as to return to the ED upon further deterioration. Patient is ready to go home. Follow-up Information     Follow up With Specialties Details Why Contact Info    Given, Sha Ramirez MD Urology Schedule an appointment as soon as possible for a visit in 1 day  [address] [place] [address] DEPT Emergency Medicine  If symptoms worsen return immediately [address]  [phone]          Current Discharge Medication List      START taking these medications    Details   trimethoprim-sulfamethoxazole (Bactrim DS) 160-800 mg per tablet Take 1 Tab by mouth two (2) times a day for 6 days. Qty: 12 Tab, Refills: 0               Core Measures:    Critical Care Time:   Critical Care Time:   I have spent 35 minutes of critical care time involved in lab review, consultations with specialist, family decision-making, and documentation. During this entire length of time I was immediately available to the patient.     Critical Care: The reason for providing this level of medical care for this critically ill patient was due a critical illness that impaired one or more vital organ systems such that there was a high probability of imminent or life threatening deterioration in the patients condition. This care involved high complexity decision making to assess, manipulate, and support vital system functions, to treat this degreee vital organ system failure and to prevent further life threatening deterioration of the patients condition. Diagnosis     Clinical Impression:   1. Nephrostomy tube displaced ([address]    2. Acute UTI    3.  Right flank pain

## 2020-04-14 ENCOUNTER — HOSPITAL ENCOUNTER (OUTPATIENT)
Dept: INFUSION THERAPY | Age: 75
End: 2020-04-14
Payer: MEDICARE

## 2020-04-14 PROCEDURE — C1894 INTRO/SHEATH, NON-LASER: HCPCS

## 2020-04-14 PROCEDURE — C1769 GUIDE WIRE: HCPCS

## 2020-04-16 LAB
BACTERIA SPEC CULT: ABNORMAL
CC UR VC: ABNORMAL
SERVICE CMNT-IMP: ABNORMAL

## 2020-04-17 ENCOUNTER — APPOINTMENT (OUTPATIENT)
Dept: INFUSION THERAPY | Age: 75
End: 2020-04-17
Payer: MEDICARE

## 2020-04-21 ENCOUNTER — APPOINTMENT (OUTPATIENT)
Dept: INFUSION THERAPY | Age: 75
End: 2020-04-21
Payer: MEDICARE

## 2020-04-24 ENCOUNTER — HOSPITAL ENCOUNTER (OUTPATIENT)
Dept: INFUSION THERAPY | Age: 75
Discharge: HOME | End: 2020-04-24
Payer: MEDICARE

## 2020-04-24 ENCOUNTER — APPOINTMENT (OUTPATIENT)
Dept: INFUSION THERAPY | Age: 75
End: 2020-04-24
Payer: MEDICARE

## 2020-04-24 VITALS
RESPIRATION RATE: 20 BRPM | TEMPERATURE: 98.2 F | OXYGEN SATURATION: 100 % | DIASTOLIC BLOOD PRESSURE: 82 MMHG | SYSTOLIC BLOOD PRESSURE: 149 MMHG | HEART RATE: 76 BPM

## 2020-04-24 DIAGNOSIS — D63.1 ANEMIA OF CHRONIC RENAL FAILURE, UNSPECIFIED CKD STAGE: ICD-10-CM

## 2020-04-24 DIAGNOSIS — N18.9 ANEMIA OF CHRONIC RENAL FAILURE, UNSPECIFIED CKD STAGE: Primary | ICD-10-CM

## 2020-04-24 DIAGNOSIS — N18.9 ANEMIA OF CHRONIC RENAL FAILURE, UNSPECIFIED CKD STAGE: ICD-10-CM

## 2020-04-24 DIAGNOSIS — D63.1 ANEMIA OF CHRONIC RENAL FAILURE, UNSPECIFIED CKD STAGE: Primary | ICD-10-CM

## 2020-04-24 LAB
BASO+EOS+MONOS # BLD AUTO: 0.5 K/UL (ref 0–2.3)
BASO+EOS+MONOS NFR BLD AUTO: 9 % (ref 0.1–17)
DIFFERENTIAL METHOD BLD: ABNORMAL
ERYTHROCYTE [DISTWIDTH] IN BLOOD BY AUTOMATED COUNT: 15.2 % (ref 11.5–14.5)
HCT VFR BLD AUTO: 27 % (ref 36–48)
HGB BLD-MCNC: 8.5 G/DL (ref 12–16)
LYMPHOCYTES # BLD: 2.9 K/UL (ref 1.1–5.9)
LYMPHOCYTES NFR BLD: 49 % (ref 14–44)
MCH RBC QN AUTO: 31.6 PG (ref 25–35)
MCHC RBC AUTO-ENTMCNC: 31.5 G/DL (ref 31–37)
MCV RBC AUTO: 100.4 FL (ref 78–102)
NEUTS SEG # BLD: 2.5 K/UL (ref 1.8–9.5)
NEUTS SEG NFR BLD: 42 % (ref 40–70)
PLATELET # BLD AUTO: 183 K/UL (ref 140–440)
RBC # BLD AUTO: 2.69 M/UL (ref 4.1–5.1)
WBC # BLD AUTO: 5.9 K/UL (ref 4.5–13)

## 2020-04-24 PROCEDURE — 85025 COMPLETE CBC W/AUTO DIFF WBC: CPT

## 2020-04-24 PROCEDURE — 96372 THER/PROPH/DIAG INJ SC/IM: CPT

## 2020-04-24 PROCEDURE — 74011250636 HC RX REV CODE- 250/636: Performed by: INTERNAL MEDICINE

## 2020-04-24 PROCEDURE — 36415 COLL VENOUS BLD VENIPUNCTURE: CPT

## 2020-04-24 RX ADMIN — EPOETIN ALFA-EPBX 8000 UNITS: 4000 INJECTION, SOLUTION INTRAVENOUS; SUBCUTANEOUS at 14:36

## 2020-04-24 NOTE — PROGRESS NOTES
[facility] Progress Note    Date: 2020    Name: Moisés Thompson    MRN: [id number]         : 1945     Retacrit injection every 2 weeks    Arrived ambulatory to [facility] at 1405. No complaints or concerns voiced      Patient has dialysis fistula in left arm. Ms. Symone Shook vitals were reviewed and patient was observed for 5 minutes prior to treatment. Visit Vitals  /82 (BP 1 Location: Right arm, BP Patient Position: Post activity)   Pulse 76   Temp 98.2 °F (36.8 °C)   Resp 20   SpO2 100%     Blood sample obtained from right hand x 1 stick for cbc. Lab results were obtained and reviewed. Recent Results (from the past 12 hour(s))   CBC WITH 3 PART DIFF    Collection Time: 20  2:15 PM   Result Value Ref Range    WBC 5.9 4.5 - 13.0 K/uL    RBC 2.69 (L) 4.10 - 5.10 M/uL    HGB 8.5 (L) 12.0 - 16 g/dL    HCT 27.0 (L) 36 - 48 %    .4 78 - 102 FL    MCH 31.6 25.0 - 35.0 PG    MCHC 31.5 31 - 37 g/dL    RDW 15.2 (H) 11.5 - 14.5 %    PLATELET 125 106 - 031 K/uL    NEUTROPHILS 42 40 - 70 %    MIXED CELLS 9 0.1 - 17 %    LYMPHOCYTES 49 (H) 14 - 44 %    ABS. NEUTROPHILS 2.5 1.8 - 9.5 K/UL    ABS. MIXED CELLS 0.5 0.0 - 2.3 K/uL    ABS. LYMPHOCYTES 2.9 1.1 - 5.9 K/UL    DF AUTOMATED           Retacrit 8,000 units was administered subcutaneously in back of right upper arm. No irritation or drainage noted at site, band aid applied. Ms. Abilio Díaz tolerated well, and had no complaints. Patient armband removed and shredded. Ms. Abilio Díaz was discharged from [place] [address] in stable condition at 1440 She is to return on 2020 at 1400 for her next appointment for CBC and Retacrit injection.     Paul Stanford RN  2020  4:22 PM

## 2020-04-30 ENCOUNTER — HOSPITAL ENCOUNTER (OUTPATIENT)
Dept: INTERVENTIONAL RADIOLOGY/VASCULAR | Age: 75
Discharge: HOME | End: 2020-04-30
Attending: RADIOLOGY | Admitting: RADIOLOGY
Payer: MEDICARE

## 2020-04-30 VITALS
WEIGHT: 240 LBS | HEART RATE: 80 BPM | DIASTOLIC BLOOD PRESSURE: 73 MMHG | RESPIRATION RATE: 20 BRPM | BODY MASS INDEX: 44.16 KG/M2 | TEMPERATURE: 98 F | HEIGHT: 62 IN | SYSTOLIC BLOOD PRESSURE: 145 MMHG | OXYGEN SATURATION: 95 %

## 2020-04-30 LAB
APTT PPP: 34.6 SEC (ref 23–36.4)
BUN BLD-MCNC: 40 MG/DL (ref 7–18)
CHLORIDE BLD-SCNC: 113 MMOL/L (ref 100–108)
ERYTHROCYTE [DISTWIDTH] IN BLOOD BY AUTOMATED COUNT: 16 % (ref 11.6–14.5)
GLUCOSE BLD STRIP.AUTO-MCNC: 84 MG/DL (ref 74–106)
HCT VFR BLD AUTO: 28.3 % (ref 35–45)
HCT VFR BLD CALC: 30 % (ref 36–49)
HGB BLD-MCNC: 10.2 G/DL (ref 12–16)
HGB BLD-MCNC: 8.8 G/DL (ref 12–16)
INR PPP: 1.3 (ref 0.8–1.2)
MCH RBC QN AUTO: 30.7 PG (ref 24–34)
MCHC RBC AUTO-ENTMCNC: 31.1 G/DL (ref 31–37)
MCV RBC AUTO: 98.6 FL (ref 74–97)
PLATELET # BLD AUTO: 211 K/UL (ref 135–420)
PMV BLD AUTO: 9.7 FL (ref 9.2–11.8)
POTASSIUM BLD-SCNC: 5.9 MMOL/L (ref 3.5–5.5)
PROTHROMBIN TIME: 15.5 SEC (ref 11.5–15.2)
RBC # BLD AUTO: 2.87 M/UL (ref 4.2–5.3)
SODIUM BLD-SCNC: 144 MMOL/L (ref 136–145)
WBC # BLD AUTO: 4.9 K/UL (ref 4.6–13.2)

## 2020-04-30 PROCEDURE — 85027 COMPLETE CBC AUTOMATED: CPT

## 2020-04-30 PROCEDURE — 50433 PLMT NEPHROURETERAL CATHETER: CPT

## 2020-04-30 PROCEDURE — 77030004561 HC CATH ANGI DX COBRA ANGI -B

## 2020-04-30 PROCEDURE — 74011000250 HC RX REV CODE- 250: Performed by: RADIOLOGY

## 2020-04-30 PROCEDURE — C1729 CATH, DRAINAGE: HCPCS

## 2020-04-30 PROCEDURE — 85610 PROTHROMBIN TIME: CPT

## 2020-04-30 PROCEDURE — C1894 INTRO/SHEATH, NON-LASER: HCPCS

## 2020-04-30 PROCEDURE — C1887 CATHETER, GUIDING: HCPCS

## 2020-04-30 PROCEDURE — C1769 GUIDE WIRE: HCPCS

## 2020-04-30 PROCEDURE — 77030025702 HC BG URIN DRN MRTM -A

## 2020-04-30 PROCEDURE — 74011250636 HC RX REV CODE- 250/636: Performed by: PHYSICIAN ASSISTANT

## 2020-04-30 PROCEDURE — 85730 THROMBOPLASTIN TIME PARTIAL: CPT

## 2020-04-30 PROCEDURE — 74011636320 HC RX REV CODE- 636/320: Performed by: RADIOLOGY

## 2020-04-30 PROCEDURE — 74011250636 HC RX REV CODE- 250/636: Performed by: RADIOLOGY

## 2020-04-30 PROCEDURE — 82947 ASSAY GLUCOSE BLOOD QUANT: CPT

## 2020-04-30 RX ORDER — SODIUM CHLORIDE 9 MG/ML
20 INJECTION, SOLUTION INTRAVENOUS CONTINUOUS
Status: DISCONTINUED | OUTPATIENT
Start: 2020-04-30 | End: 2020-04-30 | Stop reason: HOSPADM

## 2020-04-30 RX ORDER — FENTANYL CITRATE 50 UG/ML
12.5-5 INJECTION, SOLUTION INTRAMUSCULAR; INTRAVENOUS
Status: DISCONTINUED | OUTPATIENT
Start: 2020-04-30 | End: 2020-04-30 | Stop reason: ALTCHOICE

## 2020-04-30 RX ORDER — LIDOCAINE HYDROCHLORIDE 10 MG/ML
30 INJECTION, SOLUTION EPIDURAL; INFILTRATION; INTRACAUDAL; PERINEURAL ONCE
Status: COMPLETED | OUTPATIENT
Start: 2020-04-30 | End: 2020-04-30

## 2020-04-30 RX ORDER — MIDAZOLAM HYDROCHLORIDE 1 MG/ML
1 INJECTION, SOLUTION INTRAMUSCULAR; INTRAVENOUS
Status: DISCONTINUED | OUTPATIENT
Start: 2020-04-30 | End: 2020-04-30 | Stop reason: ALTCHOICE

## 2020-04-30 RX ADMIN — SODIUM CHLORIDE 20 ML/HR: 900 INJECTION, SOLUTION INTRAVENOUS at 10:50

## 2020-04-30 RX ADMIN — FENTANYL CITRATE 50 MCG: 50 INJECTION INTRAMUSCULAR; INTRAVENOUS at 11:10

## 2020-04-30 RX ADMIN — LIDOCAINE HYDROCHLORIDE 30 ML: 10 INJECTION, SOLUTION EPIDURAL; INFILTRATION; INTRACAUDAL; PERINEURAL at 11:14

## 2020-04-30 RX ADMIN — MIDAZOLAM 1 MG: 1 INJECTION INTRAMUSCULAR; INTRAVENOUS at 11:15

## 2020-04-30 RX ADMIN — FENTANYL CITRATE 50 MCG: 50 INJECTION INTRAMUSCULAR; INTRAVENOUS at 11:15

## 2020-04-30 RX ADMIN — MIDAZOLAM 1 MG: 1 INJECTION INTRAMUSCULAR; INTRAVENOUS at 11:10

## 2020-04-30 RX ADMIN — IOHEXOL 50 ML: 300 INJECTION, SOLUTION INTRAVENOUS at 11:32

## 2020-04-30 RX ADMIN — CEFTRIAXONE SODIUM 1 G: 1 INJECTION, POWDER, FOR SOLUTION INTRAMUSCULAR; INTRAVENOUS at 11:10

## 2020-04-30 NOTE — PROGRESS NOTES
TRANSFER - IN REPORT:    Verbal report received from Shea Ferrara (name) on [facility]  being received from IR lab (unit) for routine post - op      Report consisted of patients Situation, Background, Assessment and   Recommendations(SBAR). Information from the following report(s) SBAR, Procedure Summary and MAR was reviewed with the receiving nurse. Opportunity for questions and clarification was provided. Assessment completed upon patients arrival to unit and care assumed.

## 2020-04-30 NOTE — PROCEDURES
RADIOLOGY POST PROCEDURE NOTE     April 30, 2020       11:57 AM     Preoperative Diagnosis:   Right obstructive uropathy. Right ureteral stricture. Postoperative Diagnosis:  Same. :  Dr. Vanessa Rehman    Assistant:  None. Type of Anesthesia: 1% plain lidocaine and IV moderate sedation with Versed and Fentanyl. Procedure/Description:  Image guided right distal ureteral high grade stricture recanalization, ureteroplasty and PCNU placement. Findings:   No bleeding. Estimated blood Loss:  Minimal    Specimen Removed:   no    Blood transfusions:  None. Implants:  10F, 26 cm, RPCNU capped. Complications: None    Condition: Stable    Discharge Plan:  discharge home      Pt will come back to IR for antegrade nephrostogram and possible PCNU removal if adequate right ureteral drainage.     Haven Bro MD

## 2020-04-30 NOTE — H&P
OUTPATIENT HISTORY AND PHYSICAL      Today 4/30/2020     Indication/Symptoms:   Karyle Milch is a 66 y.o. female with a history of right distal ureteral stricture managed by nephrostomy tube who presents for an image-guided PCN to PCNU conversion with ureteral stricture recanalization with moderate sedation. Patient has been NPO since midnight and takes no blood thinning medications. Current Meds:    Prior to Admission medications    Medication Sig Start Date End Date Taking? Authorizing Provider   fluticasone propionate (FLONASE) 50 mcg/actuation nasal spray 2 sprays in each nostril daily 2/25/20  Yes Emiliano Bobby MD   lactobacillus sp. 50 billion cpu (BIO-K PLUS) 50 billion cell -375 mg cap capsule Take 1 Cap by mouth daily. 2/25/20  Yes Emiliano Bobby MD   tamsulosin (FLOMAX) 0.4 mg capsule Take 1 Cap by mouth daily. 2/25/20  Yes Emiliano Bobby MD   sodium bicarbonate 650 mg tablet Take 2 Tabs by mouth three (3) times daily. Indications: excess body acid 2/24/20  Yes Emiliano Bobby MD   acetaminophen (TYLENOL) 325 mg tablet Take 650 mg by mouth two (2) times a day. Yes Samira, MD Lexus   allopurinoL (ZYLOPRIM) 100 mg tablet Take 200 mg by mouth daily. Yes Lexus Hogan MD   amLODIPine (NORVASC) 5 mg tablet Take 5 mg by mouth daily. Yes Lexus Hogan MD   ascorbic acid, vitamin C, (VITAMIN C) 500 mg tablet Take 500 mg by mouth. Yes Lexus Hogan MD   calcitRIOL (ROCALTROL) 0.25 mcg capsule Take 0.25 mcg by mouth every other day. Yes Samira, MD Lexus   carvediloL (COREG) 12.5 mg tablet Take  by mouth two (2) times daily (with meals). Yes Samira, MD [facility]   cholecalciferol (VITAMIN D3) (2,000 UNITS /50 MCG) cap capsule Take 2,000 Units by mouth two (2) times a day. Take two tabs a total of 4000 units   Yes Samira, MD [facility]   latanoprost (XALATAN) 0.005 % ophthalmic solution Administer 1 Drop to both eyes nightly.  One drop at bedtime   Yes Lexus Hogan MD   levothyroxine (SYNTHROID) 125 mcg tablet Take 125 mcg by mouth Daily (before breakfast). Yes Samira, MD [facility]   omeprazole (PRILOSEC) 20 mg capsule Take 20 mg by mouth daily. Yes Samira, MD [facility]   vit B Cmplx 3-FA-Vit C-Biotin (NEPHRO HOWIE RX) 1- mg-mg-mcg tablet Take 1 Tab by mouth daily. Yes Samira, MD [facility]   cyclobenzaprine (FLEXERIL) 5 mg tablet Take 5 mg by mouth daily. Samira, MD [facility]   ondansetron (ZOFRAN ODT) 4 mg disintegrating tablet Take 4 mg by mouth every eight (8) hours as needed for Nausea or Vomiting. Other, MD Lexus       Allergies: Allergies   Allergen Reactions    Ciprofloxacin Hives    Statins-Hmg-Coa Reductase Inhibitors Other (comments)     Body ache       Comorbid Conditions:    Past Medical History:   Diagnosis Date    Acidosis     Anemia     Arteriovenous fistula (HCC)     CKD (chronic kidney disease)     HLD (hyperlipidemia)     HTN (hypertension)     Hyperparathyroidism due to renal insufficiency (HCC)     Hypothyroid     Kidney stone     Lung mass     Recurrent UTI     Ureter, stricture     Uric acid nephrolithiasis     Urinary incontinence           Past Surgical History:   Procedure Laterality Date    HX APPENDECTOMY      HX CHOLECYSTECTOMY      HX GASTRIC BYPASS      HX KNEE ARTHROSCOPY      HX UROLOGICAL      right PCN placement    HX UROLOGICAL  07/23/2018    RIGHT URETEROSCOPY WITH HOLMIUM LASER    IR EXCHANGE NEPHRO PERC LT SI  2/21/2020    IR EXCHANGE NEPHRO PERC RT SI  4/13/2020     Data:    Visit Vitals  /52 (BP 1 Location: Right arm, BP Patient Position: Head of bed elevated (Comment degrees))   Pulse 72   Temp 98 °F (36.7 °C)   Resp 18   Ht 5' 2\" (1.575 m)   Wt 108.9 kg (240 lb)   SpO2 99%   Breastfeeding No   BMI 43.90 kg/m²   :  Recent Labs     04/30/20  0930        Recent Labs     04/30/20  0930   INR 1.3*   APTT 34.6       The H & P and/or progress notes and any available imaging were reviewed.   The risks, indications and possible alternatives to the procedure, including doing nothing, were discussed and informed consent was obtained. Physical Exam:      Mental status:   Alert and oriented. Examination specific to the procedure proposed to be performed and any co morbid conditions:   Mallampati classification 2 ,  ASA 2   Heart:   Regular rate. Lungs:   Normal respiratory effort. Symmetrical rise and fall of chest    The patient is an appropriate candidate to undergo the planned procedure and sedation.     Melany Jacob

## 2020-04-30 NOTE — PROGRESS NOTES
Pt provided discharge instructions. All questions answered and pt verbalized understanding. PIV removed. Procedure site within normal limits. Pt escorted to front of building for discharge.

## 2020-04-30 NOTE — DISCHARGE INSTRUCTIONS
DISCHARGE SUMMARY from Nurse    PATIENT INSTRUCTIONS:    After general anesthesia or intravenous sedation, for 24 hours or while taking prescription Narcotics:  · Limit your activities  · Do not drive and operate hazardous machinery  · Do not make important personal or business decisions  · Do  not drink alcoholic beverages  · If you have not urinated within 8 hours after discharge, please contact your surgeon on call. Report the following to your surgeon:  · Excessive pain, swelling, redness or odor of or around the surgical area  · Temperature over 100.5  · Nausea and vomiting lasting longer than 4 hours or if unable to take medications  · Any signs of decreased circulation or nerve impairment to extremity: change in color, persistent  numbness, tingling, coldness or increase pain  · Any questions    What to do at Home:  Recommended activity: Activity as tolerated and no driving for today. If you experience any of the following symptoms pain not relieved by over the counter medications, please follow up with Dr. Almita Turcios. *  Please give a list of your current medications to your Primary Care Provider. *  Please update this list whenever your medications are discontinued, doses are      changed, or new medications (including over-the-counter products) are added. *  Please carry medication information at all times in case of emergency situations. These are general instructions for a healthy lifestyle:    No smoking/ No tobacco products/ Avoid exposure to second hand smoke  Surgeon General's Warning:  Quitting smoking now greatly reduces serious risk to your health.     Obesity, smoking, and sedentary lifestyle greatly increases your risk for illness    A healthy diet, regular physical exercise & weight monitoring are important for maintaining a healthy lifestyle    You may be retaining fluid if you have a history of heart failure or if you experience any of the following symptoms:  Weight gain of 3 pounds or more overnight or 5 pounds in a week, increased swelling in our hands or feet or shortness of breath while lying flat in bed. Please call your doctor as soon as you notice any of these symptoms; do not wait until your next office visit. The discharge information has been reviewed with the patient. The patient verbalized understanding. Discharge medications reviewed with the patient and appropriate educational materials and side effects teaching were provided. Nephrostomy Tube Discharge Instructions    General Information:   A nephrostomy is a tube inserted through your skin and into the kidney. The purpose of the tube is to drain urine outside of the body. This is done in the event fo a block or a damaged ureter. Most of these tubes are usually changed every 2-3 months. However, some patients may need to have their tubes changed more often. Home Care Instructions: You can resume your regular diet. Do not drink alcohol, drive, or make any important legal decisions in the next 24 hours. Do not lift anything heavier than a gallon of milk or do anything strenuous for the next 24 hours. You should not shower for 24 hours. You should not bathe or swim while you have this drain in place. Your doctor may arrange for home health to visit you to help take care of the tube. You should clean the tube and change the dressing at least every 48 hours and more as needed. Keep the dressing clean and dry. Keep up with how much drainage you get from the tube and report this to your doctor. Call If:   You should call your Physician and/or the Radiology Nurse if you have any bleeding other than a small spot on your bandage. Call if you have any signs of infection, fever, or increased pain at the site of the tube.   Call if you should have leakage around the tube, a change in the appearance of the urine draining from the tube, increased pain in the lower back, or no drainage from the tube for 12 hours. Call if the tube has pulled back, or has been pulled out. Follow-Up Instructions:  Please see your ordering doctor as he/she has requested.     To Reach Us:        Patient Signature:  Date: 4/30/2020  Discharging Nurse: Dexter Damian, JOSE ANTONIO    ___________________________________________________________________________________________________________________________________

## 2020-05-08 ENCOUNTER — HOSPITAL ENCOUNTER (OUTPATIENT)
Dept: INFUSION THERAPY | Age: 75
Discharge: HOME | End: 2020-05-08
Payer: MEDICARE

## 2020-05-08 VITALS
TEMPERATURE: 97 F | DIASTOLIC BLOOD PRESSURE: 64 MMHG | HEART RATE: 72 BPM | SYSTOLIC BLOOD PRESSURE: 110 MMHG | OXYGEN SATURATION: 98 % | RESPIRATION RATE: 20 BRPM

## 2020-05-08 DIAGNOSIS — N18.9 ANEMIA OF CHRONIC RENAL FAILURE, UNSPECIFIED CKD STAGE: ICD-10-CM

## 2020-05-08 DIAGNOSIS — D63.1 ANEMIA OF CHRONIC RENAL FAILURE, UNSPECIFIED CKD STAGE: ICD-10-CM

## 2020-05-08 LAB
ALBUMIN SERPL-MCNC: 3.3 G/DL (ref 3.4–5)
ANION GAP SERPL CALC-SCNC: 5 MMOL/L (ref 3–18)
BASO+EOS+MONOS # BLD AUTO: 0.6 K/UL (ref 0–2.3)
BASO+EOS+MONOS NFR BLD AUTO: 10 % (ref 0.1–17)
BUN SERPL-MCNC: 42 MG/DL (ref 7–18)
BUN/CREAT SERPL: 11 (ref 12–20)
CALCIUM SERPL-MCNC: 8.6 MG/DL (ref 8.5–10.1)
CALCIUM SERPL-MCNC: 8.7 MG/DL (ref 8.5–10.1)
CHLORIDE SERPL-SCNC: 114 MMOL/L (ref 100–111)
CO2 SERPL-SCNC: 22 MMOL/L (ref 21–32)
CREAT SERPL-MCNC: 3.85 MG/DL (ref 0.6–1.3)
DIFFERENTIAL METHOD BLD: ABNORMAL
ERYTHROCYTE [DISTWIDTH] IN BLOOD BY AUTOMATED COUNT: 16 % (ref 11.5–14.5)
FERRITIN SERPL-MCNC: 70 NG/ML (ref 8–388)
GLUCOSE SERPL-MCNC: 90 MG/DL (ref 74–99)
HCT VFR BLD AUTO: 30.2 % (ref 36–48)
HGB BLD-MCNC: 9.7 G/DL (ref 12–16)
IRON SATN MFR SERPL: 27 % (ref 20–50)
IRON SERPL-MCNC: 72 UG/DL (ref 50–175)
LYMPHOCYTES # BLD: 3 K/UL (ref 1.1–5.9)
LYMPHOCYTES NFR BLD: 48 % (ref 14–44)
MCH RBC QN AUTO: 31.9 PG (ref 25–35)
MCHC RBC AUTO-ENTMCNC: 32.1 G/DL (ref 31–37)
MCV RBC AUTO: 99.3 FL (ref 78–102)
NEUTS SEG # BLD: 2.5 K/UL (ref 1.8–9.5)
NEUTS SEG NFR BLD: 42 % (ref 40–70)
PHOSPHATE SERPL-MCNC: 4.1 MG/DL (ref 2.5–4.9)
PLATELET # BLD AUTO: 227 K/UL (ref 140–440)
POTASSIUM SERPL-SCNC: 5.4 MMOL/L (ref 3.5–5.5)
PTH-INTACT SERPL-MCNC: 398.4 PG/ML (ref 18.4–88)
RBC # BLD AUTO: 3.04 M/UL (ref 4.1–5.1)
SODIUM SERPL-SCNC: 141 MMOL/L (ref 136–145)
TIBC SERPL-MCNC: 263 UG/DL (ref 250–450)
WBC # BLD AUTO: 6.1 K/UL (ref 4.5–13)

## 2020-05-08 PROCEDURE — 36415 COLL VENOUS BLD VENIPUNCTURE: CPT

## 2020-05-08 PROCEDURE — 80069 RENAL FUNCTION PANEL: CPT

## 2020-05-08 PROCEDURE — 83540 ASSAY OF IRON: CPT

## 2020-05-08 PROCEDURE — 85025 COMPLETE CBC W/AUTO DIFF WBC: CPT

## 2020-05-08 PROCEDURE — 83970 ASSAY OF PARATHORMONE: CPT

## 2020-05-08 PROCEDURE — 82728 ASSAY OF FERRITIN: CPT

## 2020-05-08 NOTE — PROGRESS NOTES
[facility] Progress Note    Date: May 8, 2020    Name: Yosi Lopes    MRN: [id number]         : 1945     Retacrit injection every 2 weeks    Arrived ambulatory to [facility] at 1410. No complaints or concerns voiced      Patient has dialysis fistula in left arm. Ms. Jorgito Sheehan vitals were reviewed and patient was observed for 5 minutes prior to treatment. Visit Vitals  /64 (BP 1 Location: Right arm, BP Patient Position: At rest)   Pulse 72   Temp 97 °F (36.1 °C)   Resp 20   SpO2 98%   Breastfeeding No     Blood sample obtained from right hand x 1 stick for cbc, other requested labs by MD for iron panel, ferritin, pth intact and renal panel obtained and sent to lab. Lab results were obtained and reviewed. Recent Results (from the past 12 hour(s))   CBC WITH 3 PART DIFF    Collection Time: 20  2:30 PM   Result Value Ref Range    WBC 6.1 4.5 - 13.0 K/uL    RBC 3.04 (L) 4.10 - 5.10 M/uL    HGB 9.7 (L) 12.0 - 16 g/dL    HCT 30.2 (L) 36 - 48 %    MCV 99.3 78 - 102 FL    MCH 31.9 25.0 - 35.0 PG    MCHC 32.1 31 - 37 g/dL    RDW 16.0 (H) 11.5 - 14.5 %    PLATELET 767 117 - 106 K/uL    NEUTROPHILS 42 40 - 70 %    MIXED CELLS 10 0.1 - 17 %    LYMPHOCYTES 48 (H) 14 - 44 %    ABS. NEUTROPHILS 2.5 1.8 - 9.5 K/UL    ABS. MIXED CELLS 0.6 0.0 - 2.3 K/uL    ABS. LYMPHOCYTES 3.0 1.1 - 5.9 K/UL    DF AUTOMATED         Retacrit HELD per parameters, HCT 30.2. Ms. Patricia Ware tolerated well, and had no complaints. Patient armband removed and shredded. Ms. Patricia Ware was discharged from [place] [address] in stable condition at 1440 She is to return on 20  at 1400 for her next appointment for CBC and Retacrit injection.       Juanito Starkey  May 8, 2020

## 2020-05-15 ENCOUNTER — APPOINTMENT (OUTPATIENT)
Dept: CT IMAGING | Age: 75
DRG: 690 | End: 2020-05-15
Attending: EMERGENCY MEDICINE
Payer: MEDICARE

## 2020-05-15 ENCOUNTER — APPOINTMENT (OUTPATIENT)
Dept: GENERAL RADIOLOGY | Age: 75
DRG: 690 | End: 2020-05-15
Attending: EMERGENCY MEDICINE
Payer: MEDICARE

## 2020-05-15 ENCOUNTER — HOSPITAL ENCOUNTER (INPATIENT)
Age: 75
LOS: 3 days | Discharge: HOME HEALTH | DRG: 690 | End: 2020-05-18
Attending: EMERGENCY MEDICINE | Admitting: FAMILY MEDICINE
Payer: MEDICARE

## 2020-05-15 DIAGNOSIS — D64.9 ANEMIA, UNSPECIFIED TYPE: Primary | ICD-10-CM

## 2020-05-15 DIAGNOSIS — E86.1 HYPOTENSION DUE TO HYPOVOLEMIA: ICD-10-CM

## 2020-05-15 DIAGNOSIS — I95.89 HYPOTENSION DUE TO HYPOVOLEMIA: ICD-10-CM

## 2020-05-15 PROBLEM — N39.0 UTI (URINARY TRACT INFECTION): Status: ACTIVE | Noted: 2020-05-15

## 2020-05-15 PROBLEM — I95.9 HYPOTENSION: Status: ACTIVE | Noted: 2020-05-15

## 2020-05-15 LAB
ABO + RH BLD: NORMAL
ALBUMIN SERPL-MCNC: 2.7 G/DL (ref 3.4–5)
ALBUMIN/GLOB SERPL: 0.6 {RATIO} (ref 0.8–1.7)
ALP SERPL-CCNC: 141 U/L (ref 45–117)
ALT SERPL-CCNC: 13 U/L (ref 13–56)
ANION GAP SERPL CALC-SCNC: 6 MMOL/L (ref 3–18)
APPEARANCE UR: ABNORMAL
AST SERPL-CCNC: 17 U/L (ref 10–38)
BACTERIA URNS QL MICRO: ABNORMAL /HPF
BASOPHILS # BLD: 0 K/UL (ref 0–0.1)
BASOPHILS NFR BLD: 0 % (ref 0–2)
BILIRUB SERPL-MCNC: 1.3 MG/DL (ref 0.2–1)
BILIRUB UR QL: NEGATIVE
BLOOD GROUP ANTIBODIES SERPL: NORMAL
BUN SERPL-MCNC: 51 MG/DL (ref 7–18)
BUN/CREAT SERPL: 13 (ref 12–20)
CALCIUM SERPL-MCNC: 8.4 MG/DL (ref 8.5–10.1)
CHLORIDE SERPL-SCNC: 111 MMOL/L (ref 100–111)
CO2 SERPL-SCNC: 23 MMOL/L (ref 21–32)
COLOR UR: YELLOW
CREAT SERPL-MCNC: 3.83 MG/DL (ref 0.6–1.3)
DIFFERENTIAL METHOD BLD: ABNORMAL
EOSINOPHIL # BLD: 0 K/UL (ref 0–0.4)
EOSINOPHIL NFR BLD: 0 % (ref 0–5)
EPITH CASTS URNS QL MICRO: ABNORMAL /LPF (ref 0–5)
ERYTHROCYTE [DISTWIDTH] IN BLOOD BY AUTOMATED COUNT: 15.8 % (ref 11.6–14.5)
GLOBULIN SER CALC-MCNC: 4.2 G/DL (ref 2–4)
GLUCOSE BLD STRIP.AUTO-MCNC: 93 MG/DL (ref 70–110)
GLUCOSE SERPL-MCNC: 100 MG/DL (ref 74–99)
GLUCOSE UR STRIP.AUTO-MCNC: NEGATIVE MG/DL
HCT VFR BLD AUTO: 24.4 % (ref 35–45)
HGB BLD-MCNC: 7.8 G/DL (ref 12–16)
HGB UR QL STRIP: ABNORMAL
KETONES UR QL STRIP.AUTO: NEGATIVE MG/DL
LACTATE BLD-SCNC: 0.83 MMOL/L (ref 0.4–2)
LACTATE SERPL-SCNC: 1.1 MMOL/L (ref 0.4–2)
LEUKOCYTE ESTERASE UR QL STRIP.AUTO: ABNORMAL
LYMPHOCYTES # BLD: 1.5 K/UL (ref 0.9–3.6)
LYMPHOCYTES NFR BLD: 18 % (ref 21–52)
MCH RBC QN AUTO: 31.1 PG (ref 24–34)
MCHC RBC AUTO-ENTMCNC: 32 G/DL (ref 31–37)
MCV RBC AUTO: 97.2 FL (ref 74–97)
MONOCYTES # BLD: 0.9 K/UL (ref 0.05–1.2)
MONOCYTES NFR BLD: 11 % (ref 3–10)
NEUTS SEG # BLD: 6.1 K/UL (ref 1.8–8)
NEUTS SEG NFR BLD: 71 % (ref 40–73)
NITRITE UR QL STRIP.AUTO: NEGATIVE
PH UR STRIP: 6.5 [PH] (ref 5–8)
PLATELET # BLD AUTO: 151 K/UL (ref 135–420)
PLATELET COMMENTS,PCOM: ABNORMAL
PMV BLD AUTO: 11 FL (ref 9.2–11.8)
POTASSIUM SERPL-SCNC: 4.6 MMOL/L (ref 3.5–5.5)
PROT SERPL-MCNC: 6.9 G/DL (ref 6.4–8.2)
PROT UR STRIP-MCNC: 100 MG/DL
RBC # BLD AUTO: 2.51 M/UL (ref 4.2–5.3)
RBC #/AREA URNS HPF: ABNORMAL /HPF (ref 0–5)
RBC MORPH BLD: ABNORMAL
SODIUM SERPL-SCNC: 140 MMOL/L (ref 136–145)
SP GR UR REFRACTOMETRY: 1.01 (ref 1–1.03)
SPECIMEN EXP DATE BLD: NORMAL
UROBILINOGEN UR QL STRIP.AUTO: 1 EU/DL (ref 0.2–1)
WBC # BLD AUTO: 8.5 K/UL (ref 4.6–13.2)
WBC URNS QL MICRO: ABNORMAL /HPF (ref 0–4)

## 2020-05-15 PROCEDURE — 96374 THER/PROPH/DIAG INJ IV PUSH: CPT

## 2020-05-15 PROCEDURE — 87086 URINE CULTURE/COLONY COUNT: CPT

## 2020-05-15 PROCEDURE — 83605 ASSAY OF LACTIC ACID: CPT

## 2020-05-15 PROCEDURE — 87040 BLOOD CULTURE FOR BACTERIA: CPT

## 2020-05-15 PROCEDURE — 74011250636 HC RX REV CODE- 250/636: Performed by: INTERNAL MEDICINE

## 2020-05-15 PROCEDURE — 74011250636 HC RX REV CODE- 250/636: Performed by: EMERGENCY MEDICINE

## 2020-05-15 PROCEDURE — 82962 GLUCOSE BLOOD TEST: CPT

## 2020-05-15 PROCEDURE — 85025 COMPLETE CBC W/AUTO DIFF WBC: CPT

## 2020-05-15 PROCEDURE — 71045 X-RAY EXAM CHEST 1 VIEW: CPT

## 2020-05-15 PROCEDURE — 87635 SARS-COV-2 COVID-19 AMP PRB: CPT

## 2020-05-15 PROCEDURE — 74011250636 HC RX REV CODE- 250/636: Performed by: STUDENT IN AN ORGANIZED HEALTH CARE EDUCATION/TRAINING PROGRAM

## 2020-05-15 PROCEDURE — 74011250637 HC RX REV CODE- 250/637: Performed by: STUDENT IN AN ORGANIZED HEALTH CARE EDUCATION/TRAINING PROGRAM

## 2020-05-15 PROCEDURE — 74011000250 HC RX REV CODE- 250: Performed by: STUDENT IN AN ORGANIZED HEALTH CARE EDUCATION/TRAINING PROGRAM

## 2020-05-15 PROCEDURE — 93005 ELECTROCARDIOGRAM TRACING: CPT

## 2020-05-15 PROCEDURE — 86900 BLOOD TYPING SEROLOGIC ABO: CPT

## 2020-05-15 PROCEDURE — 80053 COMPREHEN METABOLIC PANEL: CPT

## 2020-05-15 PROCEDURE — 65660000000 HC RM CCU STEPDOWN

## 2020-05-15 PROCEDURE — 96361 HYDRATE IV INFUSION ADD-ON: CPT

## 2020-05-15 PROCEDURE — 74011000250 HC RX REV CODE- 250: Performed by: EMERGENCY MEDICINE

## 2020-05-15 PROCEDURE — 99285 EMERGENCY DEPT VISIT HI MDM: CPT

## 2020-05-15 PROCEDURE — 81001 URINALYSIS AUTO W/SCOPE: CPT

## 2020-05-15 PROCEDURE — 74176 CT ABD & PELVIS W/O CONTRAST: CPT

## 2020-05-15 PROCEDURE — 77030038269 HC DRN EXT URIN PURWCK BARD -A

## 2020-05-15 RX ORDER — SODIUM CHLORIDE 0.9 % (FLUSH) 0.9 %
5-10 SYRINGE (ML) INJECTION AS NEEDED
Status: DISCONTINUED | OUTPATIENT
Start: 2020-05-15 | End: 2020-05-18 | Stop reason: HOSPADM

## 2020-05-15 RX ORDER — TAMSULOSIN HYDROCHLORIDE 0.4 MG/1
0.4 CAPSULE ORAL DAILY
Status: DISCONTINUED | OUTPATIENT
Start: 2020-05-16 | End: 2020-05-18 | Stop reason: HOSPADM

## 2020-05-15 RX ORDER — GABAPENTIN 100 MG/1
100 CAPSULE ORAL
COMMUNITY
End: 2021-10-14

## 2020-05-15 RX ORDER — SODIUM CHLORIDE 9 MG/ML
25 INJECTION, SOLUTION INTRAVENOUS CONTINUOUS
Status: DISCONTINUED | OUTPATIENT
Start: 2020-05-15 | End: 2020-05-17

## 2020-05-15 RX ORDER — AMLODIPINE BESYLATE 5 MG/1
5 TABLET ORAL DAILY
Status: DISCONTINUED | OUTPATIENT
Start: 2020-05-16 | End: 2020-05-18 | Stop reason: HOSPADM

## 2020-05-15 RX ORDER — ALLOPURINOL 100 MG/1
200 TABLET ORAL DAILY
Status: DISCONTINUED | OUTPATIENT
Start: 2020-05-16 | End: 2020-05-15

## 2020-05-15 RX ORDER — ENOXAPARIN SODIUM 100 MG/ML
30 INJECTION SUBCUTANEOUS EVERY 24 HOURS
Status: DISCONTINUED | OUTPATIENT
Start: 2020-05-15 | End: 2020-05-18 | Stop reason: HOSPADM

## 2020-05-15 RX ORDER — GABAPENTIN 100 MG/1
100 CAPSULE ORAL
Status: DISCONTINUED | OUTPATIENT
Start: 2020-05-15 | End: 2020-05-18 | Stop reason: HOSPADM

## 2020-05-15 RX ORDER — ASCORBIC ACID 250 MG
500 TABLET ORAL DAILY
Status: DISCONTINUED | OUTPATIENT
Start: 2020-05-16 | End: 2020-05-18 | Stop reason: HOSPADM

## 2020-05-15 RX ORDER — CARVEDILOL 12.5 MG/1
12.5 TABLET ORAL 2 TIMES DAILY WITH MEALS
Status: DISCONTINUED | OUTPATIENT
Start: 2020-05-15 | End: 2020-05-18 | Stop reason: HOSPADM

## 2020-05-15 RX ORDER — MELATONIN
2000 2 TIMES DAILY
Status: DISCONTINUED | OUTPATIENT
Start: 2020-05-15 | End: 2020-05-18 | Stop reason: HOSPADM

## 2020-05-15 RX ORDER — ALLOPURINOL 100 MG/1
50 TABLET ORAL DAILY
Status: DISCONTINUED | OUTPATIENT
Start: 2020-05-16 | End: 2020-05-18 | Stop reason: HOSPADM

## 2020-05-15 RX ORDER — PANTOPRAZOLE SODIUM 40 MG/1
40 TABLET, DELAYED RELEASE ORAL DAILY
Status: DISCONTINUED | OUTPATIENT
Start: 2020-05-16 | End: 2020-05-18 | Stop reason: HOSPADM

## 2020-05-15 RX ORDER — LATANOPROST 50 UG/ML
1 SOLUTION/ DROPS OPHTHALMIC
Status: DISCONTINUED | OUTPATIENT
Start: 2020-05-15 | End: 2020-05-18 | Stop reason: HOSPADM

## 2020-05-15 RX ORDER — ACETAMINOPHEN 325 MG/1
650 TABLET ORAL 2 TIMES DAILY
Status: DISCONTINUED | OUTPATIENT
Start: 2020-05-15 | End: 2020-05-18 | Stop reason: HOSPADM

## 2020-05-15 RX ORDER — ONDANSETRON 4 MG/1
4 TABLET, ORALLY DISINTEGRATING ORAL
Status: DISCONTINUED | OUTPATIENT
Start: 2020-05-15 | End: 2020-05-18 | Stop reason: HOSPADM

## 2020-05-15 RX ORDER — CALCITRIOL 0.25 UG/1
0.25 CAPSULE ORAL EVERY OTHER DAY
Status: DISCONTINUED | OUTPATIENT
Start: 2020-05-15 | End: 2020-05-18 | Stop reason: HOSPADM

## 2020-05-15 RX ORDER — LEVOTHYROXINE SODIUM 125 UG/1
125 TABLET ORAL
Status: DISCONTINUED | OUTPATIENT
Start: 2020-05-16 | End: 2020-05-18 | Stop reason: HOSPADM

## 2020-05-15 RX ORDER — SODIUM BICARBONATE 650 MG/1
1300 TABLET ORAL 3 TIMES DAILY
Status: DISCONTINUED | OUTPATIENT
Start: 2020-05-15 | End: 2020-05-18 | Stop reason: HOSPADM

## 2020-05-15 RX ORDER — CYCLOBENZAPRINE HCL 10 MG
5 TABLET ORAL DAILY
Status: DISCONTINUED | OUTPATIENT
Start: 2020-05-16 | End: 2020-05-15

## 2020-05-15 RX ADMIN — EPOETIN ALFA-EPBX 10000 UNITS: 10000 INJECTION, SOLUTION INTRAVENOUS; SUBCUTANEOUS at 23:23

## 2020-05-15 RX ADMIN — ENOXAPARIN SODIUM 30 MG: 30 INJECTION SUBCUTANEOUS at 17:21

## 2020-05-15 RX ADMIN — SODIUM CHLORIDE 50 ML/HR: 900 INJECTION, SOLUTION INTRAVENOUS at 17:21

## 2020-05-15 RX ADMIN — SODIUM BICARBONATE 650 MG TABLET 1300 MG: at 23:22

## 2020-05-15 RX ADMIN — CHOLECALCIFEROL TAB 25 MCG (1000 UNIT) 2 TABLET: 25 TAB at 17:22

## 2020-05-15 RX ADMIN — ACETAMINOPHEN 650 MG: 325 TABLET ORAL at 17:22

## 2020-05-15 RX ADMIN — LATANOPROST 1 DROP: 50 SOLUTION OPHTHALMIC at 23:22

## 2020-05-15 RX ADMIN — CEFEPIME 2 G: 2 INJECTION, POWDER, FOR SOLUTION INTRAVENOUS at 06:51

## 2020-05-15 RX ADMIN — SODIUM BICARBONATE 650 MG TABLET 1300 MG: at 17:22

## 2020-05-15 RX ADMIN — SODIUM CHLORIDE 1000 ML: 900 INJECTION, SOLUTION INTRAVENOUS at 07:57

## 2020-05-15 RX ADMIN — SODIUM CHLORIDE 1000 ML: 900 INJECTION, SOLUTION INTRAVENOUS at 06:50

## 2020-05-15 RX ADMIN — GABAPENTIN 100 MG: 100 CAPSULE ORAL at 23:22

## 2020-05-15 NOTE — PROGRESS NOTES
PATIENT NOT SEEN  Chart, imaging and labs reviewed with Dr. Darwin Piña  Patient admitted for further management of NVD, fever and right flank pain  hx of MDRO UTI's and Right distal ureteral stricture managed with nephrostomy tube since 2018   Creat at baseline, No leukocytosis, UA appears infected however expected with PCNU in place  Low grade Temp 99, other VSS      PLAN:  Urine culture pending- abx per primary  CT with Mild right hydro which can be seen 2/2 reflux from stent  No acute urological procedure indicated at this time - she isn't septic and creat at her baseline  Will sign off, please contact with any questions or concerns      Amanda L. Millard Habermann  [facility]  Pager # [phone]    Available M-F 7:30- 5pm .  After 5pm and weekends please call answering service at [phone]

## 2020-05-15 NOTE — PROGRESS NOTES
Asked to evaluate for crf stage 5.pt not seen ,she is covid 19 rule out. I reviewed chart and labs  Pt has hx of crf stage 5,reccurent uti,hx of right hydronephrosis,s/p right nephrostomy that was changed to ureteral stent in April 2020  Presented to er with hypotension ,r flank pain,possible pyelonephritis ,received ivf in er and continued on ivf on floor. no reported sob. ct scan showed improvement in right hydronephrosis  Pt has anemia,on epo  Acidosis on bicarb tabs  Sec hyperparathyroidism  There's no indication for dialysis  Continue ivf,hold bp meds  Continue po bicarb  I will order epo for anemia. hold venofer because of infection  Adjust antibiotics for renal failure  Decrease allopurinol dose  I will evaluate tomorrow with appropriate protection

## 2020-05-15 NOTE — ED NOTES
Scant amount of urine in suction canister, will wait for larger volume suitable for urine sample collection.

## 2020-05-15 NOTE — ACP (ADVANCE CARE PLANNING)
Unable to speak with pt due to isolation in ED and no phone. Spoke with pt's son, Julio, who indicated pt was [facility]. Julio stated pt is not  and he is her only child. ACP discussed with Julio who stated he would discuss with pt and PCP.  ABHISHEK Crump, [facility]- [phone]

## 2020-05-15 NOTE — ED PROVIDER NOTES
EMERGENCY DEPARTMENT HISTORY AND PHYSICAL EXAM    6:30 AM      Date: 5/15/2020  Patient Name: Zee Del Valle    History of Presenting Illness     Chief Complaint   Patient presents with    Fever    Vomiting         History Provided By: Patient    Additional History (Context): Zee Del Valle is a 66 y.o. female with No significant past medical history who presents with tactile fever, flank pain and diarrhea for 2 days. Patient has a history of nephrostomy tube. She states it is clamped. She is complaining of tactile fever. She denies cough or shortness of breath. She does admit to nausea and vomiting. PCP: Ti Kern MD        Current Facility-Administered Medications   Medication Dose Route Frequency Provider Last Rate Last Dose    sodium chloride (NS) flush 5-10 mL  5-10 mL IntraVENous PRN Zamzam Gannon DO        sodium chloride 0.9 % bolus infusion 1,000 mL  1,000 mL IntraVENous ONCE Felipe Gannon DO        Followed by   [address] [place] [address] sodium chloride 0.9 % bolus infusion 1,000 mL  1,000 mL IntraVENous ONCE Felipe Gannon DO        Followed by   [address] [place] [address] sodium chloride 0.9 % bolus infusion 1,000 mL  1,000 mL IntraVENous ONCE Felipe Gannon DO        Followed by   [address] [place] [address] sodium chloride 0.9 % bolus infusion 267 mL  267 mL IntraVENous ONCE Felipe Gannon DO        cefepime (MAXIPIME) 2 g in sterile water (preservative free) 10 mL IV syringe  2 g IntraVENous Q24H Zamzam Gannon DO         Current Outpatient Medications   Medication Sig Dispense Refill    fluticasone propionate (FLONASE) 50 mcg/actuation nasal spray 2 sprays in each nostril daily 1 Bottle 1    lactobacillus sp. 50 billion cpu (BIO-K PLUS) 50 billion cell -375 mg cap capsule Take 1 Cap by mouth daily. 30 Cap 2    tamsulosin (FLOMAX) 0.4 mg capsule Take 1 Cap by mouth daily. 90 Cap 3    sodium bicarbonate 650 mg tablet Take 2 Tabs by mouth three (3) times daily.  Indications: excess body acid 30 Tab 1    acetaminophen (TYLENOL) 325 mg tablet Take 650 mg by mouth two (2) times a day.  allopurinoL (ZYLOPRIM) 100 mg tablet Take 200 mg by mouth daily.  amLODIPine (NORVASC) 5 mg tablet Take 5 mg by mouth daily.  ascorbic acid, vitamin C, (VITAMIN C) 500 mg tablet Take 500 mg by mouth.  calcitRIOL (ROCALTROL) 0.25 mcg capsule Take 0.25 mcg by mouth every other day.  carvediloL (COREG) 12.5 mg tablet Take  by mouth two (2) times daily (with meals).  cholecalciferol (VITAMIN D3) (2,000 UNITS /50 MCG) cap capsule Take 2,000 Units by mouth two (2) times a day. Take two tabs a total of 4000 units      cyclobenzaprine (FLEXERIL) 5 mg tablet Take 5 mg by mouth daily.  latanoprost (XALATAN) 0.005 % ophthalmic solution Administer 1 Drop to both eyes nightly. One drop at bedtime      levothyroxine (SYNTHROID) 125 mcg tablet Take 125 mcg by mouth Daily (before breakfast).  omeprazole (PRILOSEC) 20 mg capsule Take 20 mg by mouth daily.  ondansetron (ZOFRAN ODT) 4 mg disintegrating tablet Take 4 mg by mouth every eight (8) hours as needed for Nausea or Vomiting.  vit B Cmplx 3-FA-Vit C-Biotin (NEPHRO HOWIE RX) 1- mg-mg-mcg tablet Take 1 Tab by mouth daily.          Past History     Past Medical History:  Past Medical History:   Diagnosis Date    Acidosis     Anemia     Arteriovenous fistula (HCC)     CKD (chronic kidney disease)     Diabetes ([facility] [address]     HLD (hyperlipidemia)     HTN (hypertension)     Hyperparathyroidism due to renal insufficiency (HCC)     Hypothyroid     Kidney stone     Lung mass     Recurrent UTI     Ureter, stricture     Uric acid nephrolithiasis     Urinary incontinence        Past Surgical History:  Past Surgical History:   Procedure Laterality Date    HX APPENDECTOMY      HX CHOLECYSTECTOMY      HX GASTRIC BYPASS      HX KNEE ARTHROSCOPY      HX UROLOGICAL      right PCN placement    HX UROLOGICAL  07/23/2018    RIGHT URETEROSCOPY WITH HOLMIUM LASER    IR EXCHANGE NEPHRO PERC LT SI  2/21/2020    IR EXCHANGE NEPHRO PERC RT SI  4/13/2020    IR NEPHROURETERAL PERC RT PLC CATH NEW ACCESS  SI  4/30/2020       Family History:  History reviewed. No pertinent family history. Social History:  Social History     Tobacco Use    Smoking status: Never Smoker    Smokeless tobacco: Never Used   Substance Use Topics    Alcohol use: Never     Frequency: Never    Drug use: Never       Allergies: Allergies   Allergen Reactions    Ciprofloxacin Hives    Statins-Hmg-Coa Reductase Inhibitors Other (comments)     Body ache         Review of Systems       Review of Systems   Constitutional: Positive for fever. Negative for chills and diaphoresis. HENT: Negative. Negative for congestion, rhinorrhea and sore throat. Eyes: Negative. Negative for pain, discharge and redness. Respiratory: Negative. Negative for cough, chest tightness, shortness of breath and wheezing. Cardiovascular: Negative. Negative for chest pain. Gastrointestinal: Positive for diarrhea, nausea and vomiting. Negative for abdominal pain and constipation. Genitourinary: Negative. Negative for dysuria, flank pain, frequency, hematuria and urgency. Musculoskeletal: Positive for back pain. Negative for neck pain. Skin: Negative. Negative for rash. Neurological: Negative. Negative for syncope, weakness, numbness and headaches. Psychiatric/Behavioral: Negative. All other systems reviewed and are negative. Physical Exam     Visit Vitals  /60 (BP 1 Location: Right arm, BP Patient Position: At rest)   Pulse 85   Temp 99.6 °F (37.6 °C)   Resp 22   Ht 5' 2\" (1.575 m)   Wt 108.9 kg (240 lb)   SpO2 95%   BMI 43.90 kg/m²         Physical Exam  Vitals signs and nursing note reviewed. Constitutional:       General: She is not in acute distress. Appearance: Normal appearance. She is well-developed. She is not ill-appearing, toxic-appearing or diaphoretic. HENT:      Head: Normocephalic and atraumatic. Mouth/Throat:      Pharynx: No oropharyngeal exudate. Eyes:      General: No scleral icterus. Conjunctiva/sclera: Conjunctivae normal.      Pupils: Pupils are equal, round, and reactive to light. Neck:      Musculoskeletal: Normal range of motion and neck supple. Thyroid: No thyromegaly. Vascular: No hepatojugular reflux or JVD. Trachea: No tracheal deviation. Cardiovascular:      Rate and Rhythm: Normal rate and regular rhythm. Pulses: Normal pulses. Radial pulses are 2+ on the right side and 2+ on the left side. Dorsalis pedis pulses are 2+ on the right side and 2+ on the left side. Heart sounds: Normal heart sounds, S1 normal and S2 normal. No murmur. No gallop. No S3 or S4 sounds. Pulmonary:      Effort: Pulmonary effort is normal. No respiratory distress. Breath sounds: Normal breath sounds. No decreased breath sounds, wheezing, rhonchi or rales. Abdominal:      General: Bowel sounds are normal. There is no distension. Palpations: Abdomen is soft. Abdomen is not rigid. There is no mass. Tenderness: There is no abdominal tenderness. There is no guarding or rebound. Negative signs include Mabry's sign and McBurney's sign. Musculoskeletal: Normal range of motion. Back:    Lymphadenopathy:      Head:      Right side of head: No submental, submandibular, preauricular or occipital adenopathy. Left side of head: No submental, submandibular, preauricular or occipital adenopathy. Cervical: No cervical adenopathy. Upper Body:      Right upper body: No supraclavicular adenopathy. Left upper body: No supraclavicular adenopathy. Skin:     General: Skin is warm and dry. Findings: No rash. Neurological:      Mental Status: She is alert. She is not disoriented. GCS: GCS eye subscore is 4. GCS verbal subscore is 5. GCS motor subscore is 6. Cranial Nerves: No cranial nerve deficit.       Sensory: No sensory deficit. Coordination: Coordination normal.      Gait: Gait normal.      Deep Tendon Reflexes: Reflexes are normal and symmetric. Psychiatric:         Speech: Speech normal.         Behavior: Behavior normal.         Thought Content: Thought content normal.         Judgment: Judgment normal.           Diagnostic Study Results     Labs -  No results found for this or any previous visit (from the past 12 hour(s)). Radiologic Studies -   XR CHEST PORT    (Results Pending)   CT ABD PELV WO CONT    (Results Pending)         Medical Decision Making   Provider Notes (Medical Decision Making): [facility]    I am the first provider for this patient. I reviewed the vital signs, available nursing notes, past medical history, past surgical history, family history and social history. Vital Signs-Reviewed the patient's vital signs. Records Reviewed: Nursing Notes (Time of Review: 6:30 AM)    ED Course: Progress Notes, Reevaluation, and Consults:    Labs pending  7:00 AM 5/15/2020    7:17 AM : Pt care transferred to Dr. Ning Hope provider. History of patient complaint(s), available diagnostic reports and current treatment plan has been discussed thoroughly. Bedside rounding on patient occured : no . Intended disposition of patient : TBD  Pending diagnostics reports and/or labs (please list): Labs and CT abd    Diagnosis         Clinical Impression: Pending     Disposition: Pending         Attestation        Provider Attestation:     I personally performed the services described in the documentation, reviewed the documentation and it accurately and completely records my words and actions utilizing the [address] [place] May 15, 2020 at 6:30 AM - Jada Gannon, DO    Disclaimer. It is dictated using utilizing voice recognition software. Unfortunately this leads to occasional typographical errors. I apologize in advance if the situation occurs.   If questions arise please do not hesitate to contact me or call our department.

## 2020-05-15 NOTE — ED NOTES
Assumed care for the patient from Dr. Maribel Sandoval pending CT result. He had discussed with urology and they were doing that she might have a blockage in her nephrostomy tube and that IR can take care of it if that was the case. However the CAT scan did not show obstruction and there is slight improvement with some stranding. The patient feels okay and she got 2 L of fluid her blood pressure is borderline with map of 60. I will observe her for a little longer to make sure that her blood pressure stays stable, if not then she will need to be started on pressors since she got the 30 cc/kg bolus and she is renal failure patient pending dialysis. Blood pressure still remains with maps in the 60s, she has good systolic pressure but her diastolic is low, given her kidney function I do not feel comfortable giving her more fluids as boluses and discharging her and I think she would benefit from observations since there is some stranding on the CT and low-grade fever and hypotension. Case was discussed with family medicine resident and the patient's been accepted.

## 2020-05-15 NOTE — ED NOTES
TRANSFER - OUT REPORT:    Verbal report given to Yeimi Ba RN (name) on [place]  being transferred to  (unit) for routine progression of care       Report consisted of patients Situation, Background, Assessment and   Recommendations(SBAR). Information from the following report(s) SBAR was reviewed with the receiving nurse. Lines:   Venous Access Device AV Fistula (Active)       Peripheral IV 05/15/20 Right Wrist (Active)   Site Assessment Clean, dry, & intact 5/15/2020  6:35 AM   Phlebitis Assessment 0 5/15/2020  6:35 AM   Infiltration Assessment 0 5/15/2020  6:35 AM   Dressing Status Clean, dry, & intact; New 5/15/2020  6:35 AM   Dressing Type Transparent 5/15/2020  6:35 AM   Hub Color/Line Status Pink;Patent; Flushed 5/15/2020  6:35 AM        Opportunity for questions and clarification was provided.       Patient transported with:   [employer]

## 2020-05-15 NOTE — DISCHARGE SUMMARY
[address] Medicine  Discharge Summary    Patient: Jonna Fatima MRN: [id number]  CSN: [id number]    YOB: 1945  Age: 66 y.o. Sex: female      Admission Date: 5/15/2020 Discharge Date: 5/18/2020   Attending: No att. providers found PCP: Aquiles Mina MD     ===================================================================    Reason for Admission: Hypotension [I95.9]  Anemia [D64.9]  Hypotension [I95.9]  UTI (urinary tract infection) [N39.0]    Discharge Diagnoses:   Complicated UTI with hx of recurrent MDR UTI;s  Nephrolithiasis s/p R nephrostomy tube  CKD stage 5  Anemia of chronic disease  Chronic metabolic acidosis  Chronic HTN with soft Bps on admission   Hx of DM2 with neuropathy  GERD  Hypothyroidism  Glaucoma  Morbid obesity    Important notes to PCP/ follow-up studies and evaluations   - Per ID, patient sent home with 10 day course of Ceflex for presumed UTI, although urine cx was negative- 8000 colonies of mixed skin mike (although there was some question about if patient received ABX prior to urine culture)  - Will need to f/u with nephrology outpatient for her Venofer/Retacrit infusions  - Amlodipine and Coreg held on discharge. Will need close monitoring outpatient. Pending labs and studies:  COVID19    Operative Procedures:   None    Discharge Medications:     Discharge Medication List as of 5/18/2020 12:58 PM      START taking these medications    Details   cephALEXin (KEFLEX) 250 mg capsule Take 1 Cap by mouth every eight (8) hours for 10 days. , Print, Disp-30 Cap, R-0         CONTINUE these medications which have NOT CHANGED    Details   gabapentin (NEURONTIN) 100 mg capsule Take 100 mg by mouth nightly., Historical Med      fluticasone propionate (FLONASE) 50 mcg/actuation nasal spray 2 sprays in each nostril daily, Print, Disp-1 Bottle, R-1      lactobacillus sp. 50 billion cpu (BIO-K PLUS) 50 billion cell -375 mg cap capsule Take 1 Cap by mouth daily. , Print, Disp-30 Cap, R-2      tamsulosin (FLOMAX) 0.4 mg capsule Take 1 Cap by mouth daily. , Print, Disp-90 Cap, R-3      sodium bicarbonate 650 mg tablet Take 2 Tabs by mouth three (3) times daily. Indications: excess body acid, Print, Disp-30 Tab, R-1      acetaminophen (TYLENOL) 325 mg tablet Take 650 mg by mouth two (2) times a day., Historical Med      allopurinoL (ZYLOPRIM) 100 mg tablet Take 200 mg by mouth daily. , Historical Med      ascorbic acid, vitamin C, (VITAMIN C) 500 mg tablet Take 500 mg by mouth., Historical Med      calcitRIOL (ROCALTROL) 0.25 mcg capsule Take 0.25 mcg by mouth every other day., Historical Med      cholecalciferol (VITAMIN D3) (2,000 UNITS /50 MCG) cap capsule Take 2,000 Units by mouth two (2) times a day. Take two tabs a total of 4000 units, Historical Med      cyclobenzaprine (FLEXERIL) 5 mg tablet Take 5 mg by mouth daily. , Historical Med      latanoprost (XALATAN) 0.005 % ophthalmic solution Administer 1 Drop to both eyes nightly. One drop at bedtime, Historical Med      levothyroxine (SYNTHROID) 125 mcg tablet Take 125 mcg by mouth Daily (before breakfast). , Historical Med      omeprazole (PRILOSEC) 20 mg capsule Take 20 mg by mouth daily. , Historical Med      ondansetron (ZOFRAN ODT) 4 mg disintegrating tablet Take 4 mg by mouth every eight (8) hours as needed for Nausea or Vomiting., Historical Med      vit B Cmplx 3-FA-Vit C-Biotin (NEPHRO HOWIE RX) 1- mg-mg-mcg tablet Take 1 Tab by mouth daily. , Historical Med         STOP taking these medications       amLODIPine (NORVASC) 5 mg tablet Comments:   Reason for Stopping: Low normal blood pressures throughout hospitalization        carvediloL (COREG) 12.5 mg tablet Comments:   Reason for Stopping:  Low normal blood pressures throughout hospitalization              Disposition: Home with Home Health    Consultants:    ID  Nephrology  Urology     [id number] Course (including pertinent history and physical findings)  Patient presented to the ED with 3 days of weakness, nausea and right sided back pain. Patient has hx of recurrent uric acid kidney stones and MDR UTIs and is incontinent. Also has hx of strictures and severe hydronephrosis for which she had a R nephrostomy tube placed in 2019. On presentation to the ED she had soft Bps with systolic's in the 41'S-330'L,  which responded to fluids and improved ('s-120's throughout rest of hospital course). ED workup significant for UA with too numerous to count WBCs, large leuk esterase and 1+ bacteria. Patient did not have any leukocytosis or lactic acidosis on admission, and her creatinine was close to her baseline at 3.83. CT scan was significant for right sided perinephric and periureteral fat stranding. Patient was started on Cefepime in the ED and and addmitted for complicated UTI. Infectious disease and urology were both consulted. Nephrology was also consulted given patient's CKD5, but her creatinine was at baseline and she had no urgent need for dialysis initiation. Urology evaluated patient and signed off. She had improving hypernephrosis on her CT scan and nephrostomy tubes did not need to be exchanged. Patient's symptoms improved and she was transitioned to Keflex PO prior to discharge. Her urine and blood cultures were negative. Patient's blood pressure remained stable with SBPs in 100's-120's. Her Carvedilol and Amlodipine were held and discontinued on discharge. She was contintued on her home medications for chronic metabolic acidosis, neuropathy, GERD, hypothyroidism and glaucoma throughout her hospital stay. Her hbg was at baseline for her anemia of chronic disease. she will resume Retacrit and Venofer infusions as an outpatient per nephrology. CURRENT ADMISSION IMAGING RESULTS   Ct Abd Pelv Wo Cont    Result Date: 5/15/2020  IMPRESSION: 1.   Interval right percutaneous nephrostomy tube exchange and placement of a right-sided nephroureteral stent, which appear appropriately positioned. 2.  Slight interval improvement of right-sided hydroureter however there is persistent moderate right pelviectasis and mild to moderate proximal through mid right hydroureter with periureteral stranding and nonspecific right perinephric stranding. 3.  Punctate focus of intraluminal gas noted within the urinary bladder, favored to be due to recent instrumentation although infection with gas-forming organism is not excluded. Recommend correlation with clinical inflammatory markers and urinalysis to exclude infection. 4.  Multiple bilateral renal cysts as well as subcentimeter hypoattenuating bilateral renal lesions which are too small to accurately characterize. Suggest further assessment with nonemergent renal ultrasound. 5.  Hepatosplenomegaly. Incomplete assessment of the hepatic dome due to exclusion from the field-of-view, exam limitation. 6.  Trace nonspecific free fluid in the pelvis, which may be due to the right renal inflammatory process or may be physiologic. 7.  Multiple new/increased subcentimeter retroperitoneal lymph nodes, nonspecific but likely reactive. Recommend attention on follow-up imaging. Please see above for additional details. Xr Chest Port    Result Date: 5/15/2020  IMPRESSION: No radiographic evidence of acute cardiopulmonary process.         Cardiology Procedures/Testing:  MODALITY RESULTS   EKG Results for orders placed or performed during the hospital encounter of 05/15/20   EKG, 12 LEAD, INITIAL   Result Value Ref Range    Ventricular Rate 72 BPM    Atrial Rate 36 BPM    QRS Duration 80 ms    Q-T Interval 378 ms    QTC Calculation (Bezet) 413 ms    Calculated R Axis -20 degrees    Diagnosis       Accelerated Junctional rhythm  Nonspecific ST abnormality  Abnormal ECG  When compared with ECG of 16-JAN-2020 16:20,  Nonspecific T wave abnormality, worse in Inferior leads  Confirmed by Stevie Nicole ([id number]) on 5/16/2020 1:41:51 PM         IR Results from Hospital Encounter encounter on 04/13/20   IR NEPHROURETERAL PERC RT PLC CATH NEW ACCESS  SI    Impression IMPRESSION:    Successful uncomplicated right nephrostomy tube removal, high-grade distal right  ureteral stricture recanalization, right ureteral high-grade stricture balloon  ureteroplasty as well as placement of the new right nephroureteral catheter. Plan: Patient will come back to interventional radiology in approximately 4  weeks for detailed antegrade nephrostogram and possible nephroureteral catheter  removal. If stricture persists however internalization with double-J stent will  be considered. IR EXCHANGE NEPHRO PERC RT SI    Impression IMPRESSION:    1. Successful, uncomplicated right percutaneous nephrostomy tube placement. Plan: Patient should come back to interventional radiology Department in  approximately 8 weeks for a routine exchange of right percutaneous nephrostomy  tube catheter. Results from [place] encounter on 02/20/20   IR EXCHANGE NEPHRO PERC LT SI    Impression IMPRESSION:      1. Successful uncomplicated right percutaneous nephrostomy catheter exchange  over wire. Plan: Right PCN should be exchanged on a routine basis every 3 months.         Special Testing/Procedures:  MODALITY RESULTS   MICRO All Micro Results     Procedure Component Value Units Date/Time    CULTURE, BLOOD [[id number]] Collected:  05/15/20 0615    Order Status:  Completed Specimen:  Blood Updated:  05/18/20 0706     Special Requests: NO SPECIAL REQUESTS        Culture result: NO GROWTH 3 DAYS       CULTURE, BLOOD [[id number]] Collected:  05/15/20 0615    Order Status:  Completed Specimen:  Blood Updated:  05/18/20 0706     Special Requests: NO SPECIAL REQUESTS        Culture result: NO GROWTH 3 DAYS       CULTURE, URINE [[id number]] Collected:  05/15/20 1128    Order Status:  Completed Specimen:  Urine from Clean catch Updated:  05/16/20 2020     Special Requests: NO SPECIAL REQUESTS [place] Count --        8000  COLONIES/mL       Culture result:       No significant growth, <10,000 CFU/mL CORRECTED ON 05/16 AT 2020: PREVIOUSLY REPORTED AS NO SIGNIFICANT GROWTH                 Laboratory Results:  LABORATORY RESULTS   HEMATOLOGY Lab Results   Component Value Date/Time    WBC 4.5 (L) 05/18/2020 03:55 AM    Hemoglobin, POC 10.2 (L) 04/30/2020 10:22 AM    HGB 7.4 (L) 05/18/2020 03:55 AM    Hematocrit, POC 30 (L) 04/30/2020 10:22 AM    HCT 23.2 (L) 05/18/2020 03:55 AM    PLATELET 866 79/80/3014 03:55 AM    MCV 97.1 (H) 05/18/2020 03:55 AM       CHEMISTRIES Lab Results   Component Value Date/Time    Sodium 144 05/18/2020 03:55 AM    Potassium 4.6 05/18/2020 03:55 AM    Chloride 116 (H) 05/18/2020 03:55 AM    CO2 24 05/18/2020 03:55 AM    Anion gap 4 05/18/2020 03:55 AM    Glucose 85 05/18/2020 03:55 AM    BUN 49 (H) 05/18/2020 03:55 AM    Creatinine 3.48 (H) 05/18/2020 03:55 AM    BUN/Creatinine ratio 14 05/18/2020 03:55 AM    GFR est AA 15 (L) 05/18/2020 03:55 AM    GFR est non-AA 13 (L) 05/18/2020 03:55 AM    Calcium 8.2 (L) 05/18/2020 03:55 AM      HEPATIC FUNCTION Lab Results   Component Value Date/Time    Albumin 2.7 (L) 05/15/2020 06:20 AM    Bilirubin, total 1.3 (H) 05/15/2020 06:20 AM    Protein, total 6.9 05/15/2020 06:20 AM    Globulin 4.2 (H) 05/15/2020 06:20 AM    A-G Ratio 0.6 (L) 05/15/2020 06:20 AM    AST (SGOT) 17 05/15/2020 06:20 AM    ALT (SGPT) 13 05/15/2020 06:20 AM    Alk. phosphatase 141 (H) 05/15/2020 06:20 AM       LACTIC ACID Lab Results   Component Value Date/Time    Lactic acid 1.1 05/15/2020 08:29 AM        RISK CALCULATORS:  SCORE RESULT   READMISSION RISK SCORE Low Risk            9       Total Score        4 IP Visits Last 12 Months (1-3=4, 4=9, >4=11)    5 Pt. Coverage (Medicare=5 , Medicaid, or Self-Pay=4)        Criteria that do not apply:    Has Seen PCP in Last 6 Months (Yes=3, No=0)    . Living with Significant Other. Assisted Living. LTAC. SNF.  or Rehab    Patient Length of Stay (>5 days = 3)    Charlson Comorbidity Score (Age + Comorbid Conditions)           Functional status and cognitive function:    Status: alert, cooperative, no distress  Condition: STABLE  Disposition: Home with home health    Diet: Renal diet    Code status and advanced care plan: DNR    Point of Contact Hector Khan  Relationship:  [phone]     Patient Education:  Patient was educated on the following topics prior to discharge: COVID self quarantine    Follow-up:   Follow-up Information     Follow up With Specialties Details Why Contact Info    [facility] on 5/20/2020 Telehealth appointment at 11am with Dr. Kinsey Wen MD Nephrology Go on 6/3/2020 for hospital follow up with nephrology at 3:20pm [address] [address] [place] [id number]  [phone]      Julia Young MD Urology  office will call with [address] [facility], [facility]   [facility]  [address] [place]  [phone]          =================================================================    Valentín Liang, PGY-1   [address]   Intern Pager: [phone]   May 18, 2020, 5:46 PM

## 2020-05-15 NOTE — PROGRESS NOTES
2301  Received patient from ED. Patient talking on phone with no distress noted. Dr. Palacios Feeling present to evaluate patient.

## 2020-05-15 NOTE — ED NOTES
Pt back from CT, resting in stretcher, alert and oriented x4. Denies needs at this time. Purewick placed on pt and hooked up to suction to facilitate urine sample collection as pt is incontinent at baseline. VSS.

## 2020-05-15 NOTE — ED NOTES
Pt resting in stretcher, remains alert and oriented x4. Updated on plan of care. Denies needs at this time. Will continue to monitor.

## 2020-05-15 NOTE — H&P
Admission History and Physical  [facility]      Patient: Vikram Cheney MRN: [id number]  [facility]: [id number]    YOB: 1945  Age: 66 y.o. Sex: female      Admission Date: 5/15/2020       HPI:     Vikram Cheney is a 66 y.o. female with PMH CKD Stage 5, hx of recurrent MDR UTIs/stones s/p R nephrostomy tube (06/20), HTN, DM II w/ neuropathy, anemia, galucoma, GERD, now presenting with complaint of weakness, nausea, right flank and back pain. Patient states that she started feeling badly 3 days ago. She was in her usual state of rodolfo before this time. Patient states that she started having some right sided back and flank pain. Also had subjective fever/chills and reports generalized weakness. Endorses nausea, but no vomiting. Patient has history of recurrent urinary tract infections with hydronephrosis and a nephrostomy tube. Patient states that she got her nephrostomy tube exchanged a couple of weeks ago and also had a stent placed. She denies any new urinary symptoms (she is incontinent and has urinary frequency at baseline). Patient states that she has been in quarantine for the past couple of months and denies any contacts. ED Course (See objective for values/interpretations):  Labs obtained: CBC, CMP, lactic acid, UA, blood and urine cultures, COVID 19,   Medications administered: 2L NS bolus, Cefipime  Imaging obtained: CT abdomen/pelvis    Review of Systems:  General ROS: Positive for  - subjective chills, fever  Psychological ROS: Positive for anxiety  Ophthalmic ROS: negative for - vision changes  ENT ROS: negative for - headaches  Hematological and Lymphatic ROS: negative for - bruising  Respiratory ROS: negative for - cough, hemoptysis or wheezing. Positive for dyspnea on exertion (baseline)  Cardiovascular ROS: negative for - chest pain, edema, loss of consciousness, or palpitations   Gastrointestinal ROS: Positive for - abdominal pain, nausea.  Negative for blood in stools, diarrhea, hematemesis, vomiting. Genito-Urinary ROS: negative for - dysuria or hematuria. Positive for incontinence, urinary frequency/urgency (baseline)  Musculoskeletal ROS: Positive for: muscle weakness (generalized)  Neurological ROS: negative for - dizziness, headaches. Positive for numbness/tingling or weakness  Dermatological ROS: negative for - rash or skin lesion changes    Past Medical History:   Diagnosis Date    Acidosis     Anemia     Arteriovenous fistula (HCC)     CKD (chronic kidney disease)     Diabetes (HCC)     HLD (hyperlipidemia)     HTN (hypertension)     Hyperparathyroidism due to renal insufficiency (HCC)     Hypothyroid     Kidney stone     Lung mass     Recurrent UTI     Ureter, stricture     Uric acid nephrolithiasis     Urinary incontinence        Past Surgical History:   Procedure Laterality Date    HX APPENDECTOMY      HX CHOLECYSTECTOMY      HX GASTRIC BYPASS      HX KNEE ARTHROSCOPY      HX UROLOGICAL      right PCN placement    HX UROLOGICAL  07/23/2018    RIGHT URETEROSCOPY WITH HOLMIUM LASER    IR EXCHANGE NEPHRO PERC LT SI  2/21/2020    IR EXCHANGE NEPHRO PERC RT SI  4/13/2020    IR NEPHROURETERAL PERC RT PLC CATH NEW ACCESS  SI  4/30/2020       History reviewed. No pertinent family history. Social History     Socioeconomic History    Marital status: UNKNOWN     Spouse name: Not on file    Number of children: Not on file    Years of education: Not on file    Highest education level: Not on file   Tobacco Use    Smoking status: Never Smoker    Smokeless tobacco: Never Used   Substance and Sexual Activity    Alcohol use: Never     Frequency: Never    Drug use: Never       Allergies   Allergen Reactions    Ciprofloxacin Hives    Statins-Hmg-Coa Reductase Inhibitors Other (comments)     Body ache       Prior to Admission Medications   Prescriptions Last Dose Informant Patient Reported?  Taking?   acetaminophen (TYLENOL) 325 mg tablet Unknown at Unknown time  Yes No   Sig: Take 650 mg by mouth two (2) times a day. allopurinoL (ZYLOPRIM) 100 mg tablet Unknown at Unknown time  Yes No   Sig: Take 200 mg by mouth daily. amLODIPine (NORVASC) 5 mg tablet Unknown at Unknown time  Yes No   Sig: Take 5 mg by mouth daily. ascorbic acid, vitamin C, (VITAMIN C) 500 mg tablet Unknown at Unknown time  Yes No   Sig: Take 500 mg by mouth.   calcitRIOL (ROCALTROL) 0.25 mcg capsule Unknown at Unknown time  Yes No   Sig: Take 0.25 mcg by mouth every other day. carvediloL (COREG) 12.5 mg tablet Unknown at Unknown time  Yes No   Sig: Take  by mouth two (2) times daily (with meals). cholecalciferol (VITAMIN D3) (2,000 UNITS /50 MCG) cap capsule Unknown at Unknown time  Yes No   Sig: Take 2,000 Units by mouth two (2) times a day. Take two tabs a total of 4000 units   cyclobenzaprine (FLEXERIL) 5 mg tablet Unknown at Unknown time  Yes No   Sig: Take 5 mg by mouth daily. fluticasone propionate (FLONASE) 50 mcg/actuation nasal spray Unknown at Unknown time  No No   Si sprays in each nostril daily   lactobacillus sp. 50 billion cpu (BIO-K PLUS) 50 billion cell -375 mg cap capsule Unknown at Unknown time  No No   Sig: Take 1 Cap by mouth daily. latanoprost (XALATAN) 0.005 % ophthalmic solution Unknown at Unknown time  Yes No   Sig: Administer 1 Drop to both eyes nightly. One drop at bedtime   levothyroxine (SYNTHROID) 125 mcg tablet Unknown at Unknown time  Yes No   Sig: Take 125 mcg by mouth Daily (before breakfast). omeprazole (PRILOSEC) 20 mg capsule Unknown at Unknown time  Yes No   Sig: Take 20 mg by mouth daily. ondansetron (ZOFRAN ODT) 4 mg disintegrating tablet Unknown at Unknown time  Yes No   Sig: Take 4 mg by mouth every eight (8) hours as needed for Nausea or Vomiting.   sodium bicarbonate 650 mg tablet Unknown at Unknown time  No No   Sig: Take 2 Tabs by mouth three (3) times daily.  Indications: excess body acid   tamsulosin (FLOMAX) 0.4 mg capsule Unknown at Unknown time  No No   Sig: Take 1 Cap by mouth daily. vit B Cmplx 3-FA-Vit C-Biotin (NEPHRO HOWIE RX) 1- mg-mg-mcg tablet Unknown at Unknown time  Yes No   Sig: Take 1 Tab by mouth daily. Facility-Administered Medications: None       Physical Exam:     Patient Vitals for the past 24 hrs:   Temp Pulse Resp BP SpO2   05/15/20 1220 98 °F (36.7 °C) 83 20 (!) 111/96 100 %   05/15/20 1200 -- 79 21 112/48 97 %   05/15/20 1130 -- 79 24 111/49 100 %   05/15/20 1115 -- 71 23 115/48 100 %   05/15/20 1100 -- 73 23 114/44 96 %   05/15/20 1045 -- 72 21 (!) 81/61 99 %   05/15/20 1030 -- 79 21 112/45 99 %   05/15/20 1015 -- 77 22 107/45 98 %   05/15/20 1000 -- 74 21 96/66 100 %   05/15/20 0930 -- 73 24 109/66 96 %   05/15/20 0915 -- 73 21 94/41 96 %   05/15/20 0900 -- 71 21 96/43 96 %   05/15/20 0845 -- 71 20 97/45 95 %   05/15/20 0830 -- 75 21 (!) 111/36 99 %   05/15/20 0820 -- 75 21 116/55 99 %   05/15/20 0745 98.3 °F (36.8 °C) 73 21 (!) 100/38 98 %   05/15/20 0715 -- 73 22 (!) 100/31 97 %   05/15/20 0534 99.6 °F (37.6 °C) 85 22 100/60 95 %       Physical Exam:  General:  AAOx3, NAD   HEENT: Conjunctiva pink, sclera anicteric. EOMI. Pharynx moist, nonerythematous. Moist mucous membranes. Thyroid not enlarged, no nodules. No cervical, supraclavicular, occipital or submandibular lymphadenopathy. No other gross abnormalities present. CV:  RRR, no murmurs. RESP:  Unlabored breathing. Lungs clear to auscultation without adventitious breath sounds. ABD:  Soft, non-distended,  tenderness to deep palpation in RLQ. No suprapubic tenderness. Obese  MS:  No joint deformity or instability. No atrophy. Back: Right sided nephrostomy tube. C/d/i. No drainage on bandage covering. Tenderness to palpation in this area. Neuro:  CN II-XII grossly intact. 5/5 strength bilateral upper extremities and lower extremities. Ext:  No edema. 2+ radial and dp pulses bilaterally.   Skin:  No rashes, lesions, or ulcers. Good turgor. Chemistry Recent Labs     05/15/20  0620   *      K 4.6      CO2 23   BUN 51*   CREA 3.83*   CA 8.4*   AGAP 6   BUCR 13   *   TP 6.9   ALB 2.7*   GLOB 4.2*   AGRAT 0.6*        CBC w/Diff Recent Labs     05/15/20  0620   WBC 8.5   RBC 2.51*   HGB 7.8*   HCT 24.4*      GRANS 71   LYMPH 18*   EOS 0        Liver Enzymes Protein, total   Date Value Ref Range Status   05/15/2020 6.9 6.4 - 8.2 g/dL Final     Albumin   Date Value Ref Range Status   05/15/2020 2.7 (L) 3.4 - 5.0 g/dL Final     Globulin   Date Value Ref Range Status   05/15/2020 4.2 (H) 2.0 - 4.0 g/dL Final     A-G Ratio   Date Value Ref Range Status   05/15/2020 0.6 (L) 0.8 - 1.7   Final     AST (SGOT)   Date Value Ref Range Status   05/15/2020 17 10 - 38 U/L Final     Alk. phosphatase   Date Value Ref Range Status   05/15/2020 141 (H) 45 - 117 U/L Final     Recent Labs     05/15/20  0620   TP 6.9   ALB 2.7*   GLOB 4.2*   AGRAT 0.6*   SGOT 17   *        Lactic Acid Lactic acid   Date Value Ref Range Status   05/15/2020 1.1 0.4 - 2.0 MMOL/L Final     Recent Labs     05/15/20  0829   LAC 1.1          Urinalysis Lab Results   Component Value Date/Time    Color YELLOW 05/15/2020 11:28 AM    Appearance CLOUDY 05/15/2020 11:28 AM    Specific gravity 1.015 05/15/2020 11:28 AM    pH (UA) 6.5 05/15/2020 11:28 AM    Protein 100 (A) 05/15/2020 11:28 AM    Glucose Negative 05/15/2020 11:28 AM    Ketone Negative 05/15/2020 11:28 AM    Bilirubin Negative 05/15/2020 11:28 AM    Urobilinogen 1.0 05/15/2020 11:28 AM    Nitrites Negative 05/15/2020 11:28 AM    Leukocyte Esterase LARGE (A) 05/15/2020 11:28 AM    Epithelial cells 1+ 05/15/2020 11:28 AM    Bacteria 1+ (A) 05/15/2020 11:28 AM    WBC TOO NUMEROUS TO COUNT 05/15/2020 11:28 AM    RBC  05/15/2020 11:28 AM     UNABLE TO QUANTITATE MICROSCOPIC PARAMETERS DUE TO EXCESSIVE WBCS        Imaging:  XR (Most Recent).  Results from [place] encounter on 05/15/20   XR CHEST PORT    Narrative EXAM: XR CHEST PORT    INDICATION: 66 years Female. Sepsis. ADDITIONAL HISTORY: None. TECHNIQUE: Frontal view of the chest.    COMPARISON: Chest radiograph 2/21/2020    FINDINGS:    Chronic elevation of the right hemidiaphragm noted. The cardiac silhouette is at the upper limits of normal in size. There is  tortuosity of the aorta. There is no confluent consolidation. There is no evidence of pleural effusion. There is no evidence of pneumothorax. Osseous structures are unchanged in appearance. There are several chronic healed  right posterior rib fractures. Degenerative changes of the thoracic spine and  shoulders noted. Prior right distal clavicle resection. Epigastric surgical  clips are again noted. Impression IMPRESSION:  No radiographic evidence of acute cardiopulmonary process. CT (Most Recent) Results from Hospital Encounter encounter on 05/15/20   CT ABD PELV WO CONT    Narrative EXAM: CT ABD PELV WO CONT    CLINICAL INDICATION/HISTORY: 66 years Female. abd pain   ADDITIONAL HISTORY: Fever, vomiting, diarrhea for 2 days. Right lower  abdominal/back pain. TECHNIQUE: CT of the abdomen and pelvis without IV contrast. Sagittal and  coronal reformats were created. All CT scans at this facility are performed using dose optimization technique as  appropriate to a performed exam, to include automated exposure control,  adjustment of the mA and/or kV according to patient size (including appropriate  matching for site specific examination) or use of iterative reconstruction  technique. IV CONTRAST: None    COMPARISON: CT abdomen/pelvis 4/13/2020    FINDINGS:   Limitations: There is limited evaluation of solid organs and vasculature due to  lack of intravenous contrast.     Lower Chest: Subsegmental atelectasis in the posterior medial right lower lobe. No suspicious pulmonary nodule or yun consolidation is seen. No pleural  effusion. Normal heart size. Coronary artery atherosclerotic calcifications are  present. Mesentery/Peritoneum: No free intraperitoneal air. Trace free fluid noted within  the pelvis. Liver: The hepatic dome is partially excluded from the field-of-view and  incompletely assessed. Liver appears enlarged. Gallbladder/biliary: Status post cholecystectomy. No intrahepatic or extra  hepatic biliary ductal dilatation appreciated. Pancreas: Punctate calcification at the level of the pancreatic uncinate  process, likely sequela of prior pancreatitis. Otherwise unremarkable. Spleen: The spleen measures 14 cm craniocaudad, enlarged. Accessory splenule  noted. Adrenal Glands: Unremarkable    Kidneys: There has been interval replacement of the previously described  percutaneous nephrostomy tube. A percutaneous nephrostomy tube is noted with tip  extending into the renal sinus. There is also a right-sided double-J  nephroureteral stent which extends from the renal pelvis to the urinary bladder. There is persistent moderate dilatation of the right renal pelvis. Mild to  moderate right-sided hydroureter within the proximal/mid ureter. There is also  proximal through mid periureteral fat stranding with mural thickening. Multiple bilateral renal cysts are noted. Some hypoattenuating lesions in the  bilateral kidneys measure less than a centimeter and are too small to accurately  characterize but statistically likely reflect cysts. No renal or ureteral  calculi as visualized. Urinary Bladder: Punctate focus of intraluminal gas noted in the anterior aspect  of the urinary bladder, thought to be most likely secondary to recent  instrumentation, versus less likely infection with gas-forming organism. No  significant mural thickening is seen. Other Pelvic Organs: No suspicious adnexal mass appreciated. Bowel: Multiple surgical clips noted around the stomach.  Prior Debby-en-Y gastric  bypass, without definite evidence of complication. Small bowel gas is noted in  the excluded stomach, nonspecific. Moderate sigmoid diverticulosis without  evidence of acute diverticulitis. Portions of the colon are underdistended and  suboptimally assessed. No pericolonic stranding or mural thickening appreciated. The appendix is not confidently visualized; however there are no secondary  findings to suggest acute appendicitis. Tiny hiatal hernia. Small bowel is  normal in caliber and distribution. Lymph Nodes: No lymphadenopathy by size criteria. Multiple subcentimeter  para-aortic, aortocaval, and paracaval lymph nodes which measure less than a  centimeter short axis, which are increased in size from prior, nonspecific but  thought to be likely reactive. Vessels: There are atherosclerotic calcifications within the aorta and its  branches. .    Body wall: Unremarkable    Musculoskeletal: Small right flank subcutaneous calcification, likely  dystrophic. Mild to moderate generalized body wall edema. Moderate to severe  degenerative changes of the lumbar spine. No acute fracture. Bilateral greater  trochanteric enthesopathy. Impression IMPRESSION:   1. Interval right percutaneous nephrostomy tube exchange and placement of a  right-sided nephroureteral stent, which appear appropriately positioned. 2.  Slight interval improvement of right-sided hydroureter however there is  persistent moderate right pelviectasis and mild to moderate proximal through mid  right hydroureter with periureteral stranding and nonspecific right perinephric  stranding. 3.  Punctate focus of intraluminal gas noted within the urinary bladder, favored  to be due to recent instrumentation although infection with gas-forming organism  is not excluded. Recommend correlation with clinical inflammatory markers and  urinalysis to exclude infection.   4.  Multiple bilateral renal cysts as well as subcentimeter hypoattenuating  bilateral renal lesions which are too small to accurately characterize. Suggest  further assessment with nonemergent renal ultrasound. 5.  Hepatosplenomegaly. Incomplete assessment of the hepatic dome due to  exclusion from the field-of-view, exam limitation. 6.  Trace nonspecific free fluid in the pelvis, which may be due to the right  renal inflammatory process or may be physiologic. 7.  Multiple new/increased subcentimeter retroperitoneal lymph nodes,  nonspecific but likely reactive. Recommend attention on follow-up imaging. Please see above for additional details. Recent Results (from the past 12 hour(s))   METABOLIC PANEL, COMPREHENSIVE    Collection Time: 05/15/20  6:20 AM   Result Value Ref Range    Sodium 140 136 - 145 mmol/L    Potassium 4.6 3.5 - 5.5 mmol/L    Chloride 111 100 - 111 mmol/L    CO2 23 21 - 32 mmol/L    Anion gap 6 3.0 - 18 mmol/L    Glucose 100 (H) 74 - 99 mg/dL    BUN 51 (H) 7.0 - 18 MG/DL    Creatinine 3.83 (H) 0.6 - 1.3 MG/DL    BUN/Creatinine ratio 13 12 - 20      GFR est AA 14 (L) >60 ml/min/1.73m2    GFR est non-AA 11 (L) >60 ml/min/1.73m2    Calcium 8.4 (L) 8.5 - 10.1 MG/DL    Bilirubin, total 1.3 (H) 0.2 - 1.0 MG/DL    ALT (SGPT) 13 13 - 56 U/L    AST (SGOT) 17 10 - 38 U/L    Alk. phosphatase 141 (H) 45 - 117 U/L    Protein, total 6.9 6.4 - 8.2 g/dL    Albumin 2.7 (L) 3.4 - 5.0 g/dL    Globulin 4.2 (H) 2.0 - 4.0 g/dL    A-G Ratio 0.6 (L) 0.8 - 1.7     CBC WITH AUTOMATED DIFF    Collection Time: 05/15/20  6:20 AM   Result Value Ref Range    WBC 8.5 4.6 - 13.2 K/uL    RBC 2.51 (L) 4.20 - 5.30 M/uL    HGB 7.8 (L) 12.0 - 16.0 g/dL    HCT 24.4 (L) 35.0 - 45.0 %    MCV 97.2 (H) 74.0 - 97.0 FL    MCH 31.1 24.0 - 34.0 PG    MCHC 32.0 31.0 - 37.0 g/dL    RDW 15.8 (H) 11.6 - 14.5 %    PLATELET 237 534 - 747 K/uL    MPV 11.0 9.2 - 11.8 FL    NEUTROPHILS 71 40 - 73 %    LYMPHOCYTES 18 (L) 21 - 52 %    MONOCYTES 11 (H) 3 - 10 %    EOSINOPHILS 0 0 - 5 %    BASOPHILS 0 0 - 2 %    ABS.  NEUTROPHILS 6.1 1.8 - 8.0 K/UL    ABS. LYMPHOCYTES 1.5 0.9 - 3.6 K/UL    ABS. MONOCYTES 0.9 0.05 - 1.2 K/UL    ABS. EOSINOPHILS 0.0 0.0 - 0.4 K/UL    ABS.  BASOPHILS 0.0 0.0 - 0.1 K/UL    DF SMEAR SCANNED      PLATELET COMMENTS ADEQUATE PLATELETS      RBC COMMENTS ANISOCYTOSIS  1+        RBC COMMENTS HYPOCHROMIA  2+        RBC COMMENTS OVALOCYTES  1+       POC LACTIC ACID    Collection Time: 05/15/20  6:30 AM   Result Value Ref Range    Lactic Acid (POC) 0.83 0.40 - 2.00 mmol/L   EKG, 12 LEAD, INITIAL    Collection Time: 05/15/20  7:37 AM   Result Value Ref Range    Ventricular Rate 72 BPM    Atrial Rate 36 BPM    QRS Duration 80 ms    Q-T Interval 378 ms    QTC Calculation (Bezet) 413 ms    Calculated R Axis -20 degrees    Diagnosis       Accelerated Junctional rhythm  Nonspecific ST abnormality  Abnormal ECG  When compared with ECG of 16-JAN-2020 16:20,  Previous ECG has undetermined rhythm, needs review  Nonspecific T wave abnormality, worse in Inferior leads     TYPE & SCREEN    Collection Time: 05/15/20  7:57 AM   Result Value Ref Range    Crossmatch Expiration 05/18/2020     ABO/Rh(D) Sharmin Evens POSITIVE     Antibody screen NEG    LACTIC ACID    Collection Time: 05/15/20  8:29 AM   Result Value Ref Range    Lactic acid 1.1 0.4 - 2.0 MMOL/L   URINALYSIS W/ RFLX MICROSCOPIC    Collection Time: 05/15/20 11:28 AM   Result Value Ref Range    Color YELLOW      Appearance CLOUDY      Specific gravity 1.015 1.005 - 1.030      pH (UA) 6.5 5.0 - 8.0      Protein 100 (A) NEG mg/dL    Glucose Negative NEG mg/dL    Ketone Negative NEG mg/dL    Bilirubin Negative NEG      Blood MODERATE (A) NEG      Urobilinogen 1.0 0.2 - 1.0 EU/dL    Nitrites Negative NEG      Leukocyte Esterase LARGE (A) NEG     URINE MICROSCOPIC ONLY    Collection Time: 05/15/20 11:28 AM   Result Value Ref Range    WBC TOO NUMEROUS TO COUNT 0 - 4 /hpf    RBC  0 - 5 /hpf     UNABLE TO QUANTITATE MICROSCOPIC PARAMETERS DUE TO EXCESSIVE WBCS    Epithelial cells 1+ 0 - 5 /lpf    Bacteria 1+ (A) NEG /hpf       Assessment/Plan:   66 y.o. female with PMH CKD Stage 5, hx of recurrent MDR UTIs/stones s/p R nephrostomy tube (06/20), HTN, DM II w/ neuropathy, anemia, galucoma, GERD, now admitted with complicated UTI     Complicated UTI/Hx of recurrent UTI/nephrolithiasis w/MDR s/p R Nephrostomy tube placement 2019  Patient presented with 3 days of weakness, nausea and right sided back pain. Patient has hx of recurrent uric acid kidney stones and MDR UTIs. She is incontinent. Also has hx of strictures and severe hydronephrosis for which she had a R nephrostomy tube placed in 2019. Of note, patient underwent IR guided R Nephrostomy tube exchange on 1/28/20 with Dr. Dustin Beard 4/30/2020. No lactic acidosis or leukocytosis in the ED. CT with some periureteral fat stranding and mural thickening concerning for ascending infection. 0/4 SIRS criteria currently. Blood pressure soft with systolic's in the 23'[id number]-656'Q initially in the ED, seems to have responded to fluids. UA with too numerous to count WBCs, large leuk esterase, 1+ bacteria, negative nitrites. - Admit to telemetry  - Continue Cefepime for now, will f/u ID recs on ABX  - FU urology recs  - IVF at 50ml/hr given hypotension but poor renal function  - Strict I&Os  - F/U UCx and blood cxs   - Continue Probiotics (patient has hx of c diff with ABX)  - Continue home Allopurinol for hx uric acid stones- will decrease dose to 50mg per nephro recs  - Continue home Flomax  - VS per unit routine  - Daily daily CBC/BMP  - PT/OT/CM     CKD Stage 5   Cr 3.83 upon admission; baseline ~3.2. Follows with Dr. Nata Wolfe. Patient states she has had discussions with him about starting dialysis in the next month or so. - Will consult nephrology, appreciate recs   - Trend Cr with daily BMP  - Renally dose meds, avoid nephrotoxic drugs    Anemia of Chronic Disease likely 2/2 to her CKD Stage 5. Last iron study 5/8/2020: Iron 72, TIBC 263, Iron saturation 27, Ferritin 70.  HgB 7.8 on admission, no signs of active bleeding. Patient on Procrit and Venofer. Will defer these injections to Nephrology. - Monitor daily CBC   - monitor for signs of bleeding      Chronic Metabolic Acidosis likely 2/2 to her CKD Stage 5  - Continue on home NaHCO2 650mg two tablets TID     Chronic HTN with soft Bps on admission: SBPs in 90's-100's since arrival to ED. Seemed to improve somewhat with fluid recussitation  - Hold home Amlodipine and Coreg for now, will restart as blood pressure tolerates  - Monitor BP closely     Hx of DM II w/ neuropathy  Last HbA1C <3.5% 2/21/2020. Takes Gabapentin 100mg QHS for neuropathy  - Continue to monitor BG thorough daily BMPs   - Continue home Gabapentin     GERD - chronic hx  On Omeprazole at home  - Protonix 40mg qday while admitted     Hypothyroidism - chronic   - Continue home Synthroid 125 mcg po qhs    Glaucoma  - continue home Latanoprost     Diet Renal Diet    DVT Prophylaxis Lovenox- renal dosing   GI Prophylaxis Protonix   Code status DNR/DNI   Disposition Telemetry      Point of Contact Julio Stern   Relationship:  [phone]       Tiffanie Gannon MD , PGY-1   [facility]   Senior Pager: [phone]   May 15, 2020, 1:04 PM              Senior Addendum to History and Physical  [facility]        Patient: [facility] MRN: [id number]  CSN: [id number]    YOB: 1945  Age: 66 y.o. Sex: female       DOA: 5/15/2020       HPI:      [facility] is a 66 y.o. female with PMH CKD Stage 5, hx of recurrent MDR UTI/nephrolithiasis s/p R nephrostomy tube (6/2019) and nephroureteral stent, urinary incontinence, HTN, DM II w/ neuropathy, anemia, glaucoma, GERD, CTS, OA in back and knees, now presenting with complaint of right flank pain and generalized weakness.     Patient complains of right back pain, subjective fever, nausea, dry heaves and generalized weakness for the past two days.  She denies cough, congestion, shortness of breath, chest pain, palpitations or constipation. Patient with significant history of recurrent nephrolithiasis requiring multiple stents which lead to strictures and hydronephrosis requiring R nephrostomy tube placement. She is followed by nephrologist Dr Adry Johnson and urologist Dr Femi Loya. She has multiple UTIs with MDR bacteria.     She has recently moved from [place] and lives with her daughter.     HPI, ROS, PMH, PSH, Family Hx, Social Hx, Home medications, and allergies as above in intern H&P. Which has been reviewed in full. Additional comments to subjective history include:     Physical Exam:      Physical Exam:  General:  Alert and Responsive and in No acute distress. HEENT: Conjunctiva pink, sclera anicteric. PERRL. EOMI. Pharynx moist, nonerythematous. Moist mucous membranes. Thyroid not enlarged, no nodules. No cervical, supraclavicular, occipital or submandibular lymphadenopathy. No other gross abnormalities present. CV:  RRR, no murmurs. RESP:  Unlabored breathing. Equal expansion bilaterally. ABD:  Soft, nontender, nondistended. Normoactive bowel sounds. Right flank nephrostomy tube. MS:  No joint deformity or instability. No atrophy. Neuro:  5/5 strength bilateral upper extremities and lower extremities. CN II-XII intact. Sensation grossly intact. A+Ox3. Ext:  No edema. 2+ radial and dp pulses bilaterally. Skin:  No rashes, lesions, or ulcers.   Good turgor.     Labs and imaging reviewed      Assessment/Plan:   66 y.o. 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     Principal Problem:    UTI (urinary tract infection) (5/15/2020)     Active Problems:    Anemia (5/15/2020)       Hypotension (3/49/7687)        Complicated UTI, Hx of recurrent MDR UTI/nephrolithiasis s/p R nephrostomy tube (6/2019) and nephroureteral stent (4/2020), CKD Stage 5, Urinary incontinence   Patient complains of flank pain, subjective fever and weakness. She was hypotensive and responded well to 2L NS bolus. Not septic - no AMS, lactic nl, HDS after IVF, no leukocytosis. UA showed mod blood, +Kathie Est, WBC TNTC and 1+ bacteria. CXR negative. Patient most likely has complicated UTI. Low suspicion for bacteremia, PNA or skin infections. She has had UTI Cx positive for Staph aureus, Staph bovis and Enterobacter cloacae. CT Abd/pelvis: Interval right percutaneous nephrostomy tube exchange and placement of a  right-sided nephroureteral stent, which appear appropriately positioned. Persistent moderate right pelviectasis and mild through mid right hydroureter with periureteral stranding and right perinephric stranding. Multiple bilateral renal cysts. Multiple new/increased subcentimeter retroperitoneal lymph nodes.  -admit to telemetry  -vitals per routine  -oxygen as needed  -CBC, BMP daily  -FU BCx, UCx  -FU COVID  -continue cefepime, adjust per ID recs  -continue tylenol  -continue zofran prn  -PT/OT/CM  -consulted urology, appreciate recommendations  -consulted infectious disease, appreciate recommendations  -consulted nephrology, appreciate recommendations     CKD Stage 5, Chronic Metabolic Acidosis  Baseline 3.13. On admission, Cr 3.83. AV fistula in place.   -BMP daily  -Renally dose medications  -Avoid nephrotoxic drugs   -continue Epo, nephrocap and rocaltrol  -continued on home NaHCO2 650mg two tablets TID     For additional problem list, assessment, and plan see intern note     Viviana Dakin, MD  5/15/2020, 12:59 PM

## 2020-05-15 NOTE — CONSULTS
Infectious Disease Consultation Note      Reason: Right kidney pyelonephritis, complicated UTI, THWIQ-10 screening    Current abx Prior abx   Cefepime since 5/15/2020      Lines:       Assessment :    66 y. o. 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      E.coli bloodstream infection in 3/2019 (pan susceptible e.coli)     Acinetobacter UTI in 4/2019 -  (intermediately susceptible to ceftriaxone, ceftazidime, pip/tazo)     Hospitalization 2/2020 for  early hemorrhagic cystitis - Right PCNL. No hydronephrosis noted on Ct scan 2/20/20. +nt edema of bladder c/w cystitis. Urine culture 2/20/20-greater than 100,000 colonies of mixed gram-positive mike including 20,000 staph aureus- MSSA.      S/P PCNL exchange on 2/21/20. Urology help appreciated. urine culture 4/13/20 positive for >100,000 colonies of strep. Bovis    Status post right ureteral stent placement about 2 weeks ago     Clinical presentation consistent with right kidney pyelonephritis, complicated urinary tract infection. CT scan revealed interval improvement of right-sided hydroureter. Persistent moderate right pelviectasis and mild to moderate proximal through mid right hydroureter with periureteral stranding and right perinephric stranding. Punctate focus of intraluminal gas noted within the urinary bladder. Low clinical probability of COVID-19 infection. Patient has documented h/o ciprofloxacin allergy. She has tolerated ciprofloxacin on multiple occasions in the past. About a year ago, she had hives when she was given ciprofloxacin. Patient wishes to try ciprofloxacin again if needed.     Recommendations:     1. continue cefepime  2 follow-up results of urine culture  3.   Follow-up urology recommendations   4. Follow-up blood cultures  5. Discontinue COVID-19 isolation if test negative     Advance Care planning:DNR: discussed  with patient/surrogate decision maker - Dilshad mabry Round- [phone]     Above plan was discussed in details with patient, and  . Please call me if any further questions or concerns. Will continue to participate in the care of this patient. Thank you for consultation request.     HPI:    66 y.o. female with PMH CKD Stage 5, hx of recurrent MDR UTIs/stones s/p R nephrostomy tube (06/20), HTN, DM II w/ neuropathy, anemia, galucoma, GERD presented to [address] [place] [address] on 5/15 with complaint of weakness, nausea, right flank and back pain.     Patient states that she started feeling badly 3 days ago. She was in her usual state of rodolfo before this time. Patient states that she started having some right sided back and flank pain. Also had subjective fever/chills and reports generalized weakness. Endorses nausea, but no vomiting. Patient has history of recurrent urinary tract infections with hydronephrosis and a nephrostomy tube. Patient states that she got her nephrostomy tube exchanged a couple of weeks ago and also had a stent placed. She denies any new urinary symptoms (she is incontinent and has urinary frequency at baseline).  No fever or leukocytosis on admission. CT scan revealed interval improvement of right-sided hydroureter. Persistent moderate right pelviectasis and mild to moderate proximal through mid right hydroureter with periureteral stranding and right perinephric stranding. Punctate focus of intraluminal gas noted within the urinary bladder. Currently patient continues to have right flank pain. She reports increased frequency of urination recently. She denies flank pain elsewhere in her body. She denies any sore throat runny nose, generalized muscle aches, cough, shortness of breath. Patient denies headaches, visual disturbances, sore throat, runny nose, earaches, cp, sob, chills, cough, abdominal pain, diarrhea, pain or weakness in extremities. He denies back pain/flank pain. denies recent sick contacts. No h/o recent travel. No known h/o MRSA colonization or infection in the past.      Past Medical History:   Diagnosis Date    Acidosis     Anemia     Arteriovenous fistula (HCC)     CKD (chronic kidney disease)     Diabetes (HCC)     HLD (hyperlipidemia)     HTN (hypertension)     Hyperparathyroidism due to renal insufficiency (HCC)     Hypothyroid     Kidney stone     Lung mass     Recurrent UTI     Ureter, stricture     Uric acid nephrolithiasis     Urinary incontinence        Past Surgical History:   Procedure Laterality Date    HX APPENDECTOMY      HX CHOLECYSTECTOMY      HX GASTRIC BYPASS      HX KNEE ARTHROSCOPY      HX UROLOGICAL      right PCN placement    HX UROLOGICAL  07/23/2018    RIGHT URETEROSCOPY WITH HOLMIUM LASER    IR EXCHANGE NEPHRO PERC LT SI  2/21/2020    IR EXCHANGE NEPHRO PERC RT SI  4/13/2020    IR NEPHROURETERAL PERC RT PLC CATH NEW ACCESS  SI  4/30/2020       home Medication List    Details   gabapentin (NEURONTIN) 100 mg capsule Take 100 mg by mouth nightly. fluticasone propionate (FLONASE) 50 mcg/actuation nasal spray 2 sprays in each nostril daily  Qty: 1 Bottle, Refills: 1      lactobacillus sp. 50 billion cpu (BIO-K PLUS) 50 billion cell -375 mg cap capsule Take 1 Cap by mouth daily. Qty: 30 Cap, Refills: 2      tamsulosin (FLOMAX) 0.4 mg capsule Take 1 Cap by mouth daily. Qty: 90 Cap, Refills: 3      sodium bicarbonate 650 mg tablet Take 2 Tabs by mouth three (3) times daily. Indications: excess body acid  Qty: 30 Tab, Refills: 1      acetaminophen (TYLENOL) 325 mg tablet Take 650 mg by mouth two (2) times a day. allopurinoL (ZYLOPRIM) 100 mg tablet Take 200 mg by mouth daily. amLODIPine (NORVASC) 5 mg tablet Take 5 mg by mouth daily.       ascorbic acid, vitamin C, (VITAMIN C) 500 mg tablet Take 500 mg by mouth.      calcitRIOL (ROCALTROL) 0.25 mcg capsule Take 0.25 mcg by mouth every other day. carvediloL (COREG) 12.5 mg tablet Take  by mouth two (2) times daily (with meals). cholecalciferol (VITAMIN D3) (2,000 UNITS /50 MCG) cap capsule Take 2,000 Units by mouth two (2) times a day. Take two tabs a total of 4000 units      cyclobenzaprine (FLEXERIL) 5 mg tablet Take 5 mg by mouth daily. latanoprost (XALATAN) 0.005 % ophthalmic solution Administer 1 Drop to both eyes nightly. One drop at bedtime      levothyroxine (SYNTHROID) 125 mcg tablet Take 125 mcg by mouth Daily (before breakfast). omeprazole (PRILOSEC) 20 mg capsule Take 20 mg by mouth daily. ondansetron (ZOFRAN ODT) 4 mg disintegrating tablet Take 4 mg by mouth every eight (8) hours as needed for Nausea or Vomiting. vit B Cmplx 3-FA-Vit C-Biotin (NEPHRO HOWIE RX) 1- mg-mg-mcg tablet Take 1 Tab by mouth daily.              Current Facility-Administered Medications   Medication Dose Route Frequency    sodium chloride (NS) flush 5-10 mL  5-10 mL IntraVENous PRN    cefepime (MAXIPIME) 2 g in sterile water (preservative free) 10 mL IV syringe  2 g IntraVENous Q24H    acetaminophen (TYLENOL) tablet 650 mg  650 mg Oral BID    [Held by provider] amLODIPine (NORVASC) tablet 5 mg  5 mg Oral DAILY    [START ON 5/16/2020] ascorbic acid (vitamin C) (VITAMIN C) tablet 500 mg  500 mg Oral DAILY    calcitRIOL (ROCALTROL) capsule 0.25 mcg  0.25 mcg Oral EVERY OTHER DAY    [Held by provider] carvediloL (COREG) tablet 12.5 mg  12.5 mg Oral BID WITH MEALS    cholecalciferol (VITAMIN D3) (1000 Units /25 mcg) tablet 2 Tab  2,000 Units Oral BID    [START ON 5/16/2020] lactobacillus sp. 50 billion cpu (BIO-K PLUS) capsule 1 Cap  1 Cap Oral DAILY    latanoprost (XALATAN) 0.005 % ophthalmic solution 1 Drop  1 Drop Both Eyes QHS    [START ON 5/16/2020] levothyroxine (SYNTHROID) tablet 125 mcg  125 mcg Oral ACB    [START ON 5/16/2020] pantoprazole (PROTONIX) tablet 40 mg  40 mg Oral DAILY    ondansetron (ZOFRAN ODT) tablet 4 mg  4 mg Oral Q8H PRN    sodium bicarbonate tablet 1,300 mg  1,300 mg Oral TID    [START ON 5/16/2020] tamsulosin (FLOMAX) capsule 0.4 mg  0.4 mg Oral DAILY    [START ON 5/16/2020] B complex-vitaminC-folic acid (NEPHROCAP) cap  1 Cap Oral DAILY    enoxaparin (LOVENOX) injection 30 mg  30 mg SubCUTAneous Q24H    0.9% sodium chloride infusion  50 mL/hr IntraVENous CONTINUOUS    epoetin caitlin-epbx (RETACRIT) injection 10,000 Units  10,000 Units SubCUTAneous Q MON, WED & FRI    gabapentin (NEURONTIN) capsule 100 mg  100 mg Oral QHS    [START ON 5/16/2020] allopurinoL (ZYLOPRIM) tablet 50 mg  50 mg Oral DAILY       Allergies: Ciprofloxacin and Statins-hmg-coa reductase inhibitors    History reviewed. No pertinent family history.   Social History     Socioeconomic History    Marital status: UNKNOWN     Spouse name: Not on file    Number of children: Not on file    Years of education: Not on file    Highest education level: Not on file   Occupational History    Not on file   Social Needs    Financial resource strain: Not on file    Food insecurity     Worry: Not on file     Inability: Not on file    Transportation needs     Medical: Not on file     Non-medical: Not on file   Tobacco Use    Smoking status: Never Smoker    Smokeless tobacco: Never Used   Substance and Sexual Activity    Alcohol use: Never     Frequency: Never    Drug use: Never    Sexual activity: Not on file   Lifestyle    Physical activity     Days per week: Not on file     Minutes per session: Not on file    Stress: Not on file   Relationships    Social connections     Talks on phone: Not on file     Gets together: Not on file     Attends [employer] service: Not on file     Active member of club or organization: Not on file     Attends meetings of clubs or organizations: Not on file     Relationship status: Not on file    Intimate partner violence     Fear of current or ex partner: Not on file     Emotionally abused: Not on file     Physically abused: Not on file     Forced sexual activity: Not on file   Other Topics Concern    Not on file   Social History Narrative    Not on file     Social History     Tobacco Use   Smoking Status Never Smoker   Smokeless Tobacco Never Used        Temp (24hrs), Av.7 °F (37.1 °C), Min:98 °F (36.7 °C), Max:99.6 °F (37.6 °C)    Visit Vitals  /64 (BP 1 Location: Right arm, BP Patient Position: At rest)   Pulse 80   Temp 99 °F (37.2 °C)   Resp 20   Ht 5' 2\" (1.575 m)   Wt 108.9 kg (240 lb)   SpO2 98%   Breastfeeding No   BMI 43.90 kg/m²       ROS: 12 point ROS obtained in details. Pertinent positives as mentioned in HPI,   otherwise negative    Physical Exam:    General: Well developed, well nourished female laying on the bed  AAOx3 in no acute distress.     General:   awake alert and oriented   HEENT:  Normocephalic, atraumatic, PERRL, EOMI, no scleral icterus or pallor; no conjunctival hemmohage;  nasal and oral mucous are moist and without evidence of lesions. No thrush. Neck supple, no bruits. Lymph Nodes:   no cervical, axillary or inguinal adenopathy   Lungs:   non-labored, bilaterally clear to auscultation- no crackles wheezes rales or rhonchi   Heart:  RRR, s1 and s2; no rubs or gallops, no edema, + pedal pulses   Abdomen:  soft, non-distended, active bowel sounds, no hepatomegaly, no splenomegaly. Minimal suprapubic tenderness   Genitourinary:  no chua   Extremities:   no clubbing, cyanosis; no joint effusions or swelling; Full ROM of all large joints to the upper and lower extremities; muscle mass appropriate for age   Neurologic:  No gross focal sensory abnormalities; 5/5 muscle strength to upper and lower extremities. Speech appropriate.  Cranial nerves intact                        Skin:  No rash or ulcers noted   Back:  no spinal or paraspinal muscle tenderness or rigidity,  right CVA tenderness around the right PCNL tube      Psychiatric:  No suicidal or homicidal ideations, appropriate mood and affect             Labs: Results:   Chemistry Recent Labs     05/15/20  0620   *      K 4.6      CO2 23   BUN 51*   CREA 3.83*   CA 8.4*   AGAP 6   BUCR 13   *   TP 6.9   ALB 2.7*   GLOB 4.2*   AGRAT 0.6*      CBC w/Diff Recent Labs     05/15/20  0620   WBC 8.5   RBC 2.51*   HGB 7.8*   HCT 24.4*      GRANS 71   LYMPH 18*   EOS 0      Microbiology No results for input(s): CULT in the last 72 hours.        RADIOLOGY:    All available imaging studies/reports in [facility] for this admission were reviewed    Dr. Yajaira Singh, Infectious Disease Specialist  [phone]  May 15, 2020  4:28 PM

## 2020-05-15 NOTE — PROGRESS NOTES
Reason for Admission:  No admission diagnoses are documented for this encounter. RRAT Score:    Not calculated            Plan for utilizing home health:    TBD                      Likelihood of Readmission:   LOW                         Transition of Care Plan:              Initial assessment completed with son, Ehsan Sosa. Cognitive status of patient: pt in ISO in ED. Face sheet information confirmed:  yes. This patient lives in a single family home with patient, son and other:  Daughter n law. Patient is able to navigate steps as needed. Prior to hospitalization, patient was considered to be independent with ADLs/IADLS : yes . Patient has a current ACP document on file: no  The patient and son will be available to transport patient home upon discharge. The patient already has [facility] Jaylen, and  medical equipment available in the home. Patient is not currently active with home health. Patient has not stayed in a skilled nursing facility or rehab. This patient is on dialysis :no    Freedom of choice signed: no. Currently, the discharge plan is Home. The patient states that she can obtain her medications from the pharmacy, and take her medications as directed. Patient's current insurance is [employer]      Care Management Interventions  PCP Verified by CM:  Yes  Mode of Transport at Discharge: Self(son will transport home)  Transition of Care Consult (CM Consult): Discharge Planning  Discharge Durable Medical Equipment: No  Current Support Network: Lives with Caregiver(lives with son )  Confirm Follow Up Transport: Family  Discharge Location  Discharge Placement: [facility] [phone]

## 2020-05-15 NOTE — ED NOTES
Bedside and verbal shift report given by Zenobia Huynh RN (off going nurse) to [facility] (oncoming nurse). Report included the following information SBAR and ED Summary.

## 2020-05-15 NOTE — PROGRESS NOTES
Reason for Renal Dosing:  Per Renal Dosing Policy    Patient clinical status and labs ordered/reviewed. Pt Weight Weight: 108.9 kg (240 lb)   Serum Creatinine Lab Results   Component Value Date/Time    Creatinine 3.85 (H) 05/08/2020 02:30 PM       Creatinine Clearance Estimated Creatinine Clearance: 14.4 mL/min (A) (by C-G formula based on SCr of 3.85 mg/dL (H)). BUN Lab Results   Component Value Date/Time    BUN 42 (H) 05/08/2020 02:30 PM    BUN, POC 40 (H) 04/30/2020 10:22 AM       WBC Lab Results   Component Value Date/Time    WBC 6.1 05/08/2020 02:30 PM      Temperature Temp: 99.6 °F (37.6 °C)   HR Pulse (Heart Rate): 85     BP BP: 100/60           Drug type: Antibiotic indicated for UTI      Drug/dose: was adjusted to : Maxipime 2 grams IV Q24H    Continue to monitor.     Signed [facility]  Date 5/15/2020  Time 6:22 AM

## 2020-05-15 NOTE — PROGRESS NOTES
Verbal shift change report given to Terrance Barrera RN by Andrae Servin. Report included the following information SBAR, Intake/Output, MAR, Recent Results and Cardiac Rhythm NSR.

## 2020-05-16 LAB
ANION GAP SERPL CALC-SCNC: 7 MMOL/L (ref 3–18)
ATRIAL RATE: 36 BPM
ATRIAL RATE: 68 BPM
BACTERIA SPEC CULT: NORMAL
BASOPHILS # BLD: 0 K/UL (ref 0–0.1)
BASOPHILS NFR BLD: 0 % (ref 0–2)
BUN SERPL-MCNC: 55 MG/DL (ref 7–18)
BUN/CREAT SERPL: 15 (ref 12–20)
CALCIUM SERPL-MCNC: 8.6 MG/DL (ref 8.5–10.1)
CALCULATED R AXIS, ECG10: -17 DEGREES
CALCULATED R AXIS, ECG10: -20 DEGREES
CALCULATED T AXIS, ECG11: 1 DEGREES
CC UR VC: NORMAL
CHLORIDE SERPL-SCNC: 114 MMOL/L (ref 100–111)
CO2 SERPL-SCNC: 23 MMOL/L (ref 21–32)
CREAT SERPL-MCNC: 3.72 MG/DL (ref 0.6–1.3)
DIAGNOSIS, 93000: NORMAL
DIAGNOSIS, 93000: NORMAL
DIFFERENTIAL METHOD BLD: ABNORMAL
EOSINOPHIL # BLD: 0.2 K/UL (ref 0–0.4)
EOSINOPHIL NFR BLD: 2 % (ref 0–5)
ERYTHROCYTE [DISTWIDTH] IN BLOOD BY AUTOMATED COUNT: 15.9 % (ref 11.6–14.5)
GLUCOSE BLD STRIP.AUTO-MCNC: 104 MG/DL (ref 70–110)
GLUCOSE BLD STRIP.AUTO-MCNC: 149 MG/DL (ref 70–110)
GLUCOSE BLD STRIP.AUTO-MCNC: 86 MG/DL (ref 70–110)
GLUCOSE SERPL-MCNC: 84 MG/DL (ref 74–99)
HCT VFR BLD AUTO: 25.6 % (ref 35–45)
HGB BLD-MCNC: 8.1 G/DL (ref 12–16)
LYMPHOCYTES # BLD: 1.6 K/UL (ref 0.9–3.6)
LYMPHOCYTES NFR BLD: 22 % (ref 21–52)
MCH RBC QN AUTO: 30.7 PG (ref 24–34)
MCHC RBC AUTO-ENTMCNC: 31.6 G/DL (ref 31–37)
MCV RBC AUTO: 97 FL (ref 74–97)
MONOCYTES # BLD: 0.9 K/UL (ref 0.05–1.2)
MONOCYTES NFR BLD: 12 % (ref 3–10)
NEUTS SEG # BLD: 4.8 K/UL (ref 1.8–8)
NEUTS SEG NFR BLD: 64 % (ref 40–73)
PLATELET # BLD AUTO: 164 K/UL (ref 135–420)
PMV BLD AUTO: 10.3 FL (ref 9.2–11.8)
POTASSIUM SERPL-SCNC: 4.8 MMOL/L (ref 3.5–5.5)
Q-T INTERVAL, ECG07: 378 MS
Q-T INTERVAL, ECG07: 384 MS
QRS DURATION, ECG06: 80 MS
QRS DURATION, ECG06: 84 MS
QTC CALCULATION (BEZET), ECG08: 413 MS
QTC CALCULATION (BEZET), ECG08: 417 MS
RBC # BLD AUTO: 2.64 M/UL (ref 4.2–5.3)
SERVICE CMNT-IMP: NORMAL
SODIUM SERPL-SCNC: 144 MMOL/L (ref 136–145)
VENTRICULAR RATE, ECG03: 71 BPM
VENTRICULAR RATE, ECG03: 72 BPM
WBC # BLD AUTO: 7.5 K/UL (ref 4.6–13.2)

## 2020-05-16 PROCEDURE — 97535 SELF CARE MNGMENT TRAINING: CPT

## 2020-05-16 PROCEDURE — 85025 COMPLETE CBC W/AUTO DIFF WBC: CPT

## 2020-05-16 PROCEDURE — 74011250636 HC RX REV CODE- 250/636: Performed by: STUDENT IN AN ORGANIZED HEALTH CARE EDUCATION/TRAINING PROGRAM

## 2020-05-16 PROCEDURE — 74011000250 HC RX REV CODE- 250: Performed by: STUDENT IN AN ORGANIZED HEALTH CARE EDUCATION/TRAINING PROGRAM

## 2020-05-16 PROCEDURE — 80048 BASIC METABOLIC PNL TOTAL CA: CPT

## 2020-05-16 PROCEDURE — 97166 OT EVAL MOD COMPLEX 45 MIN: CPT

## 2020-05-16 PROCEDURE — 65660000000 HC RM CCU STEPDOWN

## 2020-05-16 PROCEDURE — 36415 COLL VENOUS BLD VENIPUNCTURE: CPT

## 2020-05-16 PROCEDURE — 82962 GLUCOSE BLOOD TEST: CPT

## 2020-05-16 PROCEDURE — 74011250636 HC RX REV CODE- 250/636: Performed by: FAMILY MEDICINE

## 2020-05-16 PROCEDURE — 74011250637 HC RX REV CODE- 250/637: Performed by: STUDENT IN AN ORGANIZED HEALTH CARE EDUCATION/TRAINING PROGRAM

## 2020-05-16 RX ADMIN — SODIUM CHLORIDE 25 ML/HR: 900 INJECTION, SOLUTION INTRAVENOUS at 21:19

## 2020-05-16 RX ADMIN — ENOXAPARIN SODIUM 30 MG: 30 INJECTION SUBCUTANEOUS at 12:46

## 2020-05-16 RX ADMIN — ALLOPURINOL 50 MG: 100 TABLET ORAL at 08:15

## 2020-05-16 RX ADMIN — CHOLECALCIFEROL TAB 25 MCG (1000 UNIT) 2 TABLET: 25 TAB at 08:14

## 2020-05-16 RX ADMIN — SODIUM BICARBONATE 650 MG TABLET 1300 MG: at 21:20

## 2020-05-16 RX ADMIN — SODIUM BICARBONATE 650 MG TABLET 1300 MG: at 17:08

## 2020-05-16 RX ADMIN — ACETAMINOPHEN 650 MG: 325 TABLET ORAL at 08:14

## 2020-05-16 RX ADMIN — NEPHROCAP 1 CAPSULE: 1 CAP ORAL at 08:14

## 2020-05-16 RX ADMIN — SODIUM BICARBONATE 650 MG TABLET 1300 MG: at 08:14

## 2020-05-16 RX ADMIN — TAMSULOSIN HYDROCHLORIDE 0.4 MG: 0.4 CAPSULE ORAL at 08:15

## 2020-05-16 RX ADMIN — LATANOPROST 1 DROP: 50 SOLUTION OPHTHALMIC at 22:00

## 2020-05-16 RX ADMIN — CHOLECALCIFEROL TAB 25 MCG (1000 UNIT) 2 TABLET: 25 TAB at 17:08

## 2020-05-16 RX ADMIN — LEVOTHYROXINE SODIUM 125 MCG: 125 TABLET ORAL at 08:14

## 2020-05-16 RX ADMIN — CEFEPIME 2 G: 2 INJECTION, POWDER, FOR SOLUTION INTRAVENOUS at 05:53

## 2020-05-16 RX ADMIN — ACETAMINOPHEN 650 MG: 325 TABLET ORAL at 17:08

## 2020-05-16 RX ADMIN — Medication 500 MG: at 08:14

## 2020-05-16 RX ADMIN — GABAPENTIN 100 MG: 100 CAPSULE ORAL at 21:20

## 2020-05-16 RX ADMIN — Medication 1 CAPSULE: at 08:14

## 2020-05-16 RX ADMIN — PANTOPRAZOLE 40 MG: 40 TABLET, DELAYED RELEASE ORAL at 08:14

## 2020-05-16 NOTE — ROUTINE PROCESS
Verbal bedside shift report given to JOSE ANTONIO Mcdaniel coming nurse by Karolina Watson RN off going nurse including KARDEX, SBAR and [facility]

## 2020-05-16 NOTE — ROUTINE PROCESS
Verbal shift change report given to KEN Blum (oncoming nurse) by Juanita De La Rosa   (offgoing nurse). Report included the following information SBAR, Kardex, Intake/Output, MAR and Cardiac Rhythm Sinus Rhythm.

## 2020-05-16 NOTE — PROGRESS NOTES
[facility]      Patient: Wilma Garcia MRN: [id number]  CSN: [id number]    YOB: 1945  Age: 66 y.o. Sex: female      Admission Date: 5/15/2020       HPI:     Wilma Garcia is a 66 y.o. female with PMH CKD Stage 5, hx of recurrent MDR UTIs/stones s/p R nephrostomy tube (06/20), HTN, DM II w/ neuropathy, anemia, galucoma, GERD, now presenting with complaint of weakness, nausea, right flank and back pain. Patient without pain currently. No fevers/chills/CVA pain. Wants pastoral care for support for anxiety and spiritual needs. Past Medical History:   Diagnosis Date    Acidosis     Anemia     Arteriovenous fistula (HCC)     CKD (chronic kidney disease)     Diabetes (HCC)     HLD (hyperlipidemia)     HTN (hypertension)     Hyperparathyroidism due to renal insufficiency (HCC)     Hypothyroid     Kidney stone     Lung mass     Recurrent UTI     Ureter, stricture     Uric acid nephrolithiasis     Urinary incontinence        Past Surgical History:   Procedure Laterality Date    HX APPENDECTOMY      HX CHOLECYSTECTOMY      HX GASTRIC BYPASS      HX KNEE ARTHROSCOPY      HX UROLOGICAL      right PCN placement    HX UROLOGICAL  07/23/2018    RIGHT URETEROSCOPY WITH HOLMIUM LASER    IR EXCHANGE NEPHRO PERC LT SI  2/21/2020    IR EXCHANGE NEPHRO PERC RT SI  4/13/2020    IR NEPHROURETERAL PERC RT PLC CATH NEW ACCESS  SI  4/30/2020       History reviewed. No pertinent family history.     Social History     Socioeconomic History    Marital status: UNKNOWN     Spouse name: Not on file    Number of children: Not on file    Years of education: Not on file    Highest education level: Not on file   Tobacco Use    Smoking status: Never Smoker    Smokeless tobacco: Never Used   Substance and Sexual Activity    Alcohol use: Never     Frequency: Never    Drug use: Never       Allergies   Allergen Reactions    Ciprofloxacin Hives    Statins-Hmg-Coa Reductase Inhibitors Other (comments)     Body ache       Prior to Admission Medications   Prescriptions Last Dose Informant Patient Reported? Taking?   acetaminophen (TYLENOL) 325 mg tablet Unknown at Unknown time  Yes No   Sig: Take 650 mg by mouth two (2) times a day. allopurinoL (ZYLOPRIM) 100 mg tablet Unknown at Unknown time  Yes No   Sig: Take 200 mg by mouth daily. amLODIPine (NORVASC) 5 mg tablet Unknown at Unknown time  Yes No   Sig: Take 5 mg by mouth daily. ascorbic acid, vitamin C, (VITAMIN C) 500 mg tablet Unknown at Unknown time  Yes No   Sig: Take 500 mg by mouth.   calcitRIOL (ROCALTROL) 0.25 mcg capsule Unknown at Unknown time  Yes No   Sig: Take 0.25 mcg by mouth every other day. carvediloL (COREG) 12.5 mg tablet Unknown at Unknown time  Yes No   Sig: Take  by mouth two (2) times daily (with meals). cholecalciferol (VITAMIN D3) (2,000 UNITS /50 MCG) cap capsule Unknown at Unknown time  Yes No   Sig: Take 2,000 Units by mouth two (2) times a day. Take two tabs a total of 4000 units   cyclobenzaprine (FLEXERIL) 5 mg tablet Unknown at Unknown time  Yes No   Sig: Take 5 mg by mouth daily. fluticasone propionate (FLONASE) 50 mcg/actuation nasal spray Unknown at Unknown time  No No   Si sprays in each nostril daily   lactobacillus sp. 50 billion cpu (BIO-K PLUS) 50 billion cell -375 mg cap capsule Unknown at Unknown time  No No   Sig: Take 1 Cap by mouth daily. latanoprost (XALATAN) 0.005 % ophthalmic solution Unknown at Unknown time  Yes No   Sig: Administer 1 Drop to both eyes nightly. One drop at bedtime   levothyroxine (SYNTHROID) 125 mcg tablet Unknown at Unknown time  Yes No   Sig: Take 125 mcg by mouth Daily (before breakfast). omeprazole (PRILOSEC) 20 mg capsule Unknown at Unknown time  Yes No   Sig: Take 20 mg by mouth daily.    ondansetron (ZOFRAN ODT) 4 mg disintegrating tablet Unknown at Unknown time  Yes No   Sig: Take 4 mg by mouth every eight (8) hours as needed for Nausea or Vomiting.   sodium bicarbonate 650 mg tablet Unknown at Unknown time  No No   Sig: Take 2 Tabs by mouth three (3) times daily. Indications: excess body acid   tamsulosin (FLOMAX) 0.4 mg capsule Unknown at Unknown time  No No   Sig: Take 1 Cap by mouth daily. vit B Cmplx 3-FA-Vit C-Biotin (NEPHRO HOWIE RX) 1- mg-mg-mcg tablet Unknown at Unknown time  Yes No   Sig: Take 1 Tab by mouth daily. Facility-Administered Medications: None       Physical Exam:     Patient Vitals for the past 24 hrs:   Temp Pulse Resp BP SpO2   05/16/20 1125 98.5 °F (36.9 °C) 82 20 95/63 97 %   05/16/20 0745 99 °F (37.2 °C) 80 19 102/53 95 %   05/16/20 0430 97.8 °F (36.6 °C) 86 20 110/72 94 %   05/16/20 0041 97.4 °F (36.3 °C) 81 20 112/61 96 %   05/15/20 2143 98.2 °F (36.8 °C) 81 20 90/55 96 %   05/15/20 1605 99 °F (37.2 °C) 80 20 116/64 98 %   05/15/20 1400 -- -- -- -- 100 %   05/15/20 1315 -- 78 19 100/75 100 %       Physical Exam:  General:  AAOx3, NAD   HEENT:  No other gross abnormalities present. CV:  RRR, no murmurs. RESP:  Unlabored breathing. Lungs clear to auscultation without adventitious breath sounds. ABD:  Soft, non-distended,  Obese  MS:  No joint deformity   Back: Right sided nephrostomy tube. C/d/i. No drainage on bandage covering. Neuro:  Grossly intact  Ext:  No edema. Skin:  No rashes, lesions, or ulcers. Good turgor.       Chemistry Recent Labs     05/16/20  0314 05/15/20  0620   GLU 84 100*    140   K 4.8 4.6   * 111   CO2 23 23   BUN 55* 51*   CREA 3.72* 3.83*   CA 8.6 8.4*   AGAP 7 6   BUCR 15 13   AP  --  141*   TP  --  6.9   ALB  --  2.7*   GLOB  --  4.2*   AGRAT  --  0.6*        CBC w/Diff Recent Labs     05/16/20  0314 05/15/20  0620   WBC 7.5 8.5   RBC 2.64* 2.51*   HGB 8.1* 7.8*   HCT 25.6* 24.4*    151   GRANS 64 71   LYMPH 22 18*   EOS 2 0        Liver Enzymes Protein, total   Date Value Ref Range Status   05/15/2020 6.9 6.4 - 8.2 g/dL Final     Albumin   Date Value Ref Range Status   05/15/2020 2.7 (L) 3.4 - 5.0 g/dL Final     Globulin   Date Value Ref Range Status   05/15/2020 4.2 (H) 2.0 - 4.0 g/dL Final     A-G Ratio   Date Value Ref Range Status   05/15/2020 0.6 (L) 0.8 - 1.7   Final     AST (SGOT)   Date Value Ref Range Status   05/15/2020 17 10 - 38 U/L Final     Alk. phosphatase   Date Value Ref Range Status   05/15/2020 141 (H) 45 - 117 U/L Final     Recent Labs     05/15/20  0620   TP 6.9   ALB 2.7*   GLOB 4.2*   AGRAT 0.6*   SGOT 17   *        Lactic Acid Lactic acid   Date Value Ref Range Status   05/15/2020 1.1 0.4 - 2.0 MMOL/L Final     Recent Labs     05/15/20  0829   LAC 1.1          Urinalysis Lab Results   Component Value Date/Time    Color YELLOW 05/15/2020 11:28 AM    Appearance CLOUDY 05/15/2020 11:28 AM    Specific gravity 1.015 05/15/2020 11:28 AM    pH (UA) 6.5 05/15/2020 11:28 AM    Protein 100 (A) 05/15/2020 11:28 AM    Glucose Negative 05/15/2020 11:28 AM    Ketone Negative 05/15/2020 11:28 AM    Bilirubin Negative 05/15/2020 11:28 AM    Urobilinogen 1.0 05/15/2020 11:28 AM    Nitrites Negative 05/15/2020 11:28 AM    Leukocyte Esterase LARGE (A) 05/15/2020 11:28 AM    Epithelial cells 1+ 05/15/2020 11:28 AM    Bacteria 1+ (A) 05/15/2020 11:28 AM    WBC TOO NUMEROUS TO COUNT 05/15/2020 11:28 AM    RBC  05/15/2020 11:28 AM     UNABLE TO QUANTITATE MICROSCOPIC PARAMETERS DUE TO EXCESSIVE WBCS        Imaging:  XR (Most Recent). Results from [place] encounter on 05/15/20   XR CHEST PORT    Narrative EXAM: XR CHEST PORT    INDICATION: 66 years Female. Sepsis. ADDITIONAL HISTORY: None. TECHNIQUE: Frontal view of the chest.    COMPARISON: Chest radiograph 2/21/2020    FINDINGS:    Chronic elevation of the right hemidiaphragm noted. The cardiac silhouette is at the upper limits of normal in size. There is  tortuosity of the aorta. There is no confluent consolidation. There is no evidence of pleural effusion.     There is no evidence of pneumothorax. Osseous structures are unchanged in appearance. There are several chronic healed  right posterior rib fractures. Degenerative changes of the thoracic spine and  shoulders noted. Prior right distal clavicle resection. Epigastric surgical  clips are again noted. Impression IMPRESSION:  No radiographic evidence of acute cardiopulmonary process. CT (Most Recent) Results from Hospital Encounter encounter on 05/15/20   CT ABD PELV WO CONT    Narrative EXAM: CT ABD PELV WO CONT    CLINICAL INDICATION/HISTORY: 66 years Female. abd pain   ADDITIONAL HISTORY: Fever, vomiting, diarrhea for 2 days. Right lower  abdominal/back pain. TECHNIQUE: CT of the abdomen and pelvis without IV contrast. Sagittal and  coronal reformats were created. All CT scans at this facility are performed using dose optimization technique as  appropriate to a performed exam, to include automated exposure control,  adjustment of the mA and/or kV according to patient size (including appropriate  matching for site specific examination) or use of iterative reconstruction  technique. IV CONTRAST: None    COMPARISON: CT abdomen/pelvis 4/13/2020    FINDINGS:   Limitations: There is limited evaluation of solid organs and vasculature due to  lack of intravenous contrast.     Lower Chest: Subsegmental atelectasis in the posterior medial right lower lobe. No suspicious pulmonary nodule or yun consolidation is seen. No pleural  effusion. Normal heart size. Coronary artery atherosclerotic calcifications are  present. Mesentery/Peritoneum: No free intraperitoneal air. Trace free fluid noted within  the pelvis. Liver: The hepatic dome is partially excluded from the field-of-view and  incompletely assessed. Liver appears enlarged. Gallbladder/biliary: Status post cholecystectomy. No intrahepatic or extra  hepatic biliary ductal dilatation appreciated.     Pancreas: Punctate calcification at the level of the pancreatic uncinate  process, likely sequela of prior pancreatitis. Otherwise unremarkable. Spleen: The spleen measures 14 cm craniocaudad, enlarged. Accessory splenule  noted. Adrenal Glands: Unremarkable    Kidneys: There has been interval replacement of the previously described  percutaneous nephrostomy tube. A percutaneous nephrostomy tube is noted with tip  extending into the renal sinus. There is also a right-sided double-J  nephroureteral stent which extends from the renal pelvis to the urinary bladder. There is persistent moderate dilatation of the right renal pelvis. Mild to  moderate right-sided hydroureter within the proximal/mid ureter. There is also  proximal through mid periureteral fat stranding with mural thickening. Multiple bilateral renal cysts are noted. Some hypoattenuating lesions in the  bilateral kidneys measure less than a centimeter and are too small to accurately  characterize but statistically likely reflect cysts. No renal or ureteral  calculi as visualized. Urinary Bladder: Punctate focus of intraluminal gas noted in the anterior aspect  of the urinary bladder, thought to be most likely secondary to recent  instrumentation, versus less likely infection with gas-forming organism. No  significant mural thickening is seen. Other Pelvic Organs: No suspicious adnexal mass appreciated. Bowel: Multiple surgical clips noted around the stomach. Prior Debby-en-Y gastric  bypass, without definite evidence of complication. Small bowel gas is noted in  the excluded stomach, nonspecific. Moderate sigmoid diverticulosis without  evidence of acute diverticulitis. Portions of the colon are underdistended and  suboptimally assessed. No pericolonic stranding or mural thickening appreciated. The appendix is not confidently visualized; however there are no secondary  findings to suggest acute appendicitis. Tiny hiatal hernia. Small bowel is  normal in caliber and distribution.     Lymph Nodes: No lymphadenopathy by size criteria. Multiple subcentimeter  para-aortic, aortocaval, and paracaval lymph nodes which measure less than a  centimeter short axis, which are increased in size from prior, nonspecific but  thought to be likely reactive. Vessels: There are atherosclerotic calcifications within the aorta and its  branches. .    Body wall: Unremarkable    Musculoskeletal: Small right flank subcutaneous calcification, likely  dystrophic. Mild to moderate generalized body wall edema. Moderate to severe  degenerative changes of the lumbar spine. No acute fracture. Bilateral greater  trochanteric enthesopathy. Impression IMPRESSION:   1. Interval right percutaneous nephrostomy tube exchange and placement of a  right-sided nephroureteral stent, which appear appropriately positioned. 2.  Slight interval improvement of right-sided hydroureter however there is  persistent moderate right pelviectasis and mild to moderate proximal through mid  right hydroureter with periureteral stranding and nonspecific right perinephric  stranding. 3.  Punctate focus of intraluminal gas noted within the urinary bladder, favored  to be due to recent instrumentation although infection with gas-forming organism  is not excluded. Recommend correlation with clinical inflammatory markers and  urinalysis to exclude infection. 4.  Multiple bilateral renal cysts as well as subcentimeter hypoattenuating  bilateral renal lesions which are too small to accurately characterize. Suggest  further assessment with nonemergent renal ultrasound. 5.  Hepatosplenomegaly. Incomplete assessment of the hepatic dome due to  exclusion from the field-of-view, exam limitation. 6.  Trace nonspecific free fluid in the pelvis, which may be due to the right  renal inflammatory process or may be physiologic. 7.  Multiple new/increased subcentimeter retroperitoneal lymph nodes,  nonspecific but likely reactive.  Recommend attention on follow-up imaging. Please see above for additional details. Recent Results (from the past 12 hour(s))   METABOLIC PANEL, BASIC    Collection Time: 05/16/20  3:14 AM   Result Value Ref Range    Sodium 144 136 - 145 mmol/L    Potassium 4.8 3.5 - 5.5 mmol/L    Chloride 114 (H) 100 - 111 mmol/L    CO2 23 21 - 32 mmol/L    Anion gap 7 3.0 - 18 mmol/L    Glucose 84 74 - 99 mg/dL    BUN 55 (H) 7.0 - 18 MG/DL    Creatinine 3.72 (H) 0.6 - 1.3 MG/DL    BUN/Creatinine ratio 15 12 - 20      GFR est AA 14 (L) >60 ml/min/1.73m2    GFR est non-AA 12 (L) >60 ml/min/1.73m2    Calcium 8.6 8.5 - 10.1 MG/DL   CBC WITH AUTOMATED DIFF    Collection Time: 05/16/20  3:14 AM   Result Value Ref Range    WBC 7.5 4.6 - 13.2 K/uL    RBC 2.64 (L) 4.20 - 5.30 M/uL    HGB 8.1 (L) 12.0 - 16.0 g/dL    HCT 25.6 (L) 35.0 - 45.0 %    MCV 97.0 74.0 - 97.0 FL    MCH 30.7 24.0 - 34.0 PG    MCHC 31.6 31.0 - 37.0 g/dL    RDW 15.9 (H) 11.6 - 14.5 %    PLATELET 375 465 - 085 K/uL    MPV 10.3 9.2 - 11.8 FL    NEUTROPHILS 64 40 - 73 %    LYMPHOCYTES 22 21 - 52 %    MONOCYTES 12 (H) 3 - 10 %    EOSINOPHILS 2 0 - 5 %    BASOPHILS 0 0 - 2 %    ABS. NEUTROPHILS 4.8 1.8 - 8.0 K/UL    ABS. LYMPHOCYTES 1.6 0.9 - 3.6 K/UL    ABS. MONOCYTES 0.9 0.05 - 1.2 K/UL    ABS. EOSINOPHILS 0.2 0.0 - 0.4 K/UL    ABS.  BASOPHILS 0.0 0.0 - 0.1 K/UL    DF AUTOMATED     GLUCOSE, POC    Collection Time: 05/16/20  7:58 AM   Result Value Ref Range    Glucose (POC) 86 70 - 110 mg/dL   GLUCOSE, POC    Collection Time: 05/16/20 11:32 AM   Result Value Ref Range    Glucose (POC) 149 (H) 70 - 110 mg/dL       Assessment/Plan:   66 y.o. female with PMH CKD Stage 5, hx of recurrent MDR UTIs/stones s/p R nephrostomy tube (06/20), HTN, DM II w/ neuropathy, anemia, galucoma, GERD, now admitted with complicated UTI     Complicated UTI/Hx of recurrent UTI/nephrolithiasis w/MDR s/p R Nephrostomy tube placement 2019  -telemetry  - Continue Cefepime for now, will f/u ID recs on ABX  - FU urology recs  - IVF at 25ml/hr   - I&Os  - F/U UCx and blood cxs   - Continue Probiotics (patient has hx of c diff with ABX)  - Continue home Allopurinol for hx uric acid stones- decreased dose to 50mg per nephro recs  - Continue home Flomax  - VS per unit routine  - Daily daily CBC/BMP  - PT/OT/CM     CKD Stage 5   Cr 3.83 upon admission; improving slowly baseline ~3.2. Follows with Dr. Glenna Tierney. Patient states she has had discussions with him about starting dialysis in the next month or so. -nephrology, appreciate recs   - Trend Cr with daily BMP  - Renally dose meds, avoid nephrotoxic drugs    Anemia of Chronic Disease likely 2/2 to her CKD Stage 5. Last iron study 5/8/2020: Iron 72, TIBC 263, Iron saturation 27, Ferritin 70. HgB 7.8 on admission, no signs of active bleeding. Patient on Procrit and Venofer outpatient. Stable. - Monitor daily CBC   - monitor for signs of bleeding      Chronic Metabolic Acidosis likely 2/2 to her CKD Stage 5  - Continue on home NaHCO2 650mg two tablets TID     Chronic HTN with soft Bps on admission: SBPs in 90's-100's since arrival to ED. Seemed to improve somewhat with fluid recussitation  - Hold home Amlodipine and Coreg for now, will restart as blood pressure tolerates  - Monitor BP closely     Hx of DM II w/ neuropathy  Last HbA1C <3.5% 2/21/2020.  Takes Gabapentin 100mg QHS for neuropathy  - Continue to monitor BG thorough daily BMPs   - Continue home Gabapentin     GERD - chronic hx  On Omeprazole at home  - Protonix 40mg qday while admitted     Hypothyroidism - chronic   - Continue home Synthroid 125 mcg po qhs    Glaucoma  - continue home Latanoprost    Pastoral Care     Diet Renal Diet    DVT Prophylaxis Lovenox- renal dosing   GI Prophylaxis Protonix   Code status DNR/DNI   Disposition Telemetry      Point of Contact Julio Stern   Relationship:  [facility] [place]      May 16, 2020, 1:04 PM

## 2020-05-16 NOTE — PROGRESS NOTES
RENAL DAILY PROGRESS NOTE    Patient: Mohsen Henderson               Sex: female          DOA: 5/15/2020  5:39 AM        YOB: 1945      Age:  66 y.o.        LOS:  LOS: 1 day     Subjective:     Mohsen Henderson is a 66 y.o.  who presents with Hypotension [I95.9]  Anemia [D64.9]  Hypotension [I95.9]  UTI (urinary tract infection) [N39.0]. Asked to evaluate for crf stage 5,admitted with pyelonephritis. has reccurent uti,renal stones,s/p r ureteral stent  Chief complains: she feels better since admission,no flank pain,fever,chills,no sob  - Reviewed last 24 hrs events     Current Facility-Administered Medications   Medication Dose Route Frequency    sodium chloride (NS) flush 5-10 mL  5-10 mL IntraVENous PRN    cefepime (MAXIPIME) 2 g in sterile water (preservative free) 10 mL IV syringe  2 g IntraVENous Q24H    acetaminophen (TYLENOL) tablet 650 mg  650 mg Oral BID    [Held by provider] amLODIPine (NORVASC) tablet 5 mg  5 mg Oral DAILY    ascorbic acid (vitamin C) (VITAMIN C) tablet 500 mg  500 mg Oral DAILY    calcitRIOL (ROCALTROL) capsule 0.25 mcg  0.25 mcg Oral EVERY OTHER DAY    [Held by provider] carvediloL (COREG) tablet 12.5 mg  12.5 mg Oral BID WITH MEALS    cholecalciferol (VITAMIN D3) (1000 Units /25 mcg) tablet 2 Tab  2,000 Units Oral BID    lactobacillus sp. 50 billion cpu (BIO-K PLUS) capsule 1 Cap  1 Cap Oral DAILY    latanoprost (XALATAN) 0.005 % ophthalmic solution 1 Drop  1 Drop Both Eyes QHS    levothyroxine (SYNTHROID) tablet 125 mcg  125 mcg Oral ACB    pantoprazole (PROTONIX) tablet 40 mg  40 mg Oral DAILY    ondansetron (ZOFRAN ODT) tablet 4 mg  4 mg Oral Q8H PRN    sodium bicarbonate tablet 1,300 mg  1,300 mg Oral TID    tamsulosin (FLOMAX) capsule 0.4 mg  0.4 mg Oral DAILY    B complex-vitaminC-folic acid (NEPHROCAP) cap  1 Cap Oral DAILY    enoxaparin (LOVENOX) injection 30 mg  30 mg SubCUTAneous Q24H    0.9% sodium chloride infusion  25 mL/hr IntraVENous CONTINUOUS    epoetin caitlin-epbx (RETACRIT) injection 10,000 Units  10,000 Units SubCUTAneous Q MON, WED & FRI    gabapentin (NEURONTIN) capsule 100 mg  100 mg Oral QHS    allopurinoL (ZYLOPRIM) tablet 50 mg  50 mg Oral DAILY       Objective:     Visit Vitals  BP 95/63 (BP 1 Location: Right arm, BP Patient Position: At rest)   Pulse 82   Temp 98.5 °F (36.9 °C)   Resp 20   Ht 5' 2\" (1.575 m)   Wt 110.7 kg (244 lb)   SpO2 97%   Breastfeeding No   BMI 44.63 kg/m²       Intake/Output Summary (Last 24 hours) at 5/16/2020 1453  Last data filed at 5/16/2020 0553  Gross per 24 hour   Intake 320 ml   Output 650 ml   Net -330 ml       Physical Examination:   Talked over phone due to covid rule out    Data Review:      Labs:     Hematology:   Recent Labs     05/16/20 0314 05/15/20  0620   WBC 7.5 8.5   HGB 8.1* 7.8*   HCT 25.6* 24.4*     Chemistry:   Recent Labs     05/16/20 0314 05/15/20  0620   BUN 55* 51*   CREA 3.72* 3.83*   CA 8.6 8.4*   ALB  --  2.7*   K 4.8 4.6    140   * 111   CO2 23 23   GLU 84 100*        Images:    XR (Most Recent). CXR reviewed by me and compared with previous CXR Results from Hospital Encounter encounter on 05/15/20   XR CHEST PORT    Narrative EXAM: XR CHEST PORT    INDICATION: 66 years Female. Sepsis. ADDITIONAL HISTORY: None. TECHNIQUE: Frontal view of the chest.    COMPARISON: Chest radiograph 2/21/2020    FINDINGS:    Chronic elevation of the right hemidiaphragm noted. The cardiac silhouette is at the upper limits of normal in size. There is  tortuosity of the aorta. There is no confluent consolidation. There is no evidence of pleural effusion. There is no evidence of pneumothorax. Osseous structures are unchanged in appearance. There are several chronic healed  right posterior rib fractures. Degenerative changes of the thoracic spine and  shoulders noted. Prior right distal clavicle resection. Epigastric surgical  clips are again noted. Impression IMPRESSION:  No radiographic evidence of acute cardiopulmonary process. CT (Most Recent) Results from Hospital Encounter encounter on 05/15/20   CT ABD PELV WO CONT    Narrative EXAM: CT ABD PELV WO CONT    CLINICAL INDICATION/HISTORY: 66 years Female. abd pain   ADDITIONAL HISTORY: Fever, vomiting, diarrhea for 2 days. Right lower  abdominal/back pain. TECHNIQUE: CT of the abdomen and pelvis without IV contrast. Sagittal and  coronal reformats were created. All CT scans at this facility are performed using dose optimization technique as  appropriate to a performed exam, to include automated exposure control,  adjustment of the mA and/or kV according to patient size (including appropriate  matching for site specific examination) or use of iterative reconstruction  technique. IV CONTRAST: None    COMPARISON: CT abdomen/pelvis 4/13/2020    FINDINGS:   Limitations: There is limited evaluation of solid organs and vasculature due to  lack of intravenous contrast.     Lower Chest: Subsegmental atelectasis in the posterior medial right lower lobe. No suspicious pulmonary nodule or yun consolidation is seen. No pleural  effusion. Normal heart size. Coronary artery atherosclerotic calcifications are  present. Mesentery/Peritoneum: No free intraperitoneal air. Trace free fluid noted within  the pelvis. Liver: The hepatic dome is partially excluded from the field-of-view and  incompletely assessed. Liver appears enlarged. Gallbladder/biliary: Status post cholecystectomy. No intrahepatic or extra  hepatic biliary ductal dilatation appreciated. Pancreas: Punctate calcification at the level of the pancreatic uncinate  process, likely sequela of prior pancreatitis. Otherwise unremarkable. Spleen: The spleen measures 14 cm craniocaudad, enlarged. Accessory splenule  noted. Adrenal Glands: Unremarkable    Kidneys:  There has been interval replacement of the previously described  percutaneous nephrostomy tube. A percutaneous nephrostomy tube is noted with tip  extending into the renal sinus. There is also a right-sided double-J  nephroureteral stent which extends from the renal pelvis to the urinary bladder. There is persistent moderate dilatation of the right renal pelvis. Mild to  moderate right-sided hydroureter within the proximal/mid ureter. There is also  proximal through mid periureteral fat stranding with mural thickening. Multiple bilateral renal cysts are noted. Some hypoattenuating lesions in the  bilateral kidneys measure less than a centimeter and are too small to accurately  characterize but statistically likely reflect cysts. No renal or ureteral  calculi as visualized. Urinary Bladder: Punctate focus of intraluminal gas noted in the anterior aspect  of the urinary bladder, thought to be most likely secondary to recent  instrumentation, versus less likely infection with gas-forming organism. No  significant mural thickening is seen. Other Pelvic Organs: No suspicious adnexal mass appreciated. Bowel: Multiple surgical clips noted around the stomach. Prior Debby-en-Y gastric  bypass, without definite evidence of complication. Small bowel gas is noted in  the excluded stomach, nonspecific. Moderate sigmoid diverticulosis without  evidence of acute diverticulitis. Portions of the colon are underdistended and  suboptimally assessed. No pericolonic stranding or mural thickening appreciated. The appendix is not confidently visualized; however there are no secondary  findings to suggest acute appendicitis. Tiny hiatal hernia. Small bowel is  normal in caliber and distribution. Lymph Nodes: No lymphadenopathy by size criteria. Multiple subcentimeter  para-aortic, aortocaval, and paracaval lymph nodes which measure less than a  centimeter short axis, which are increased in size from prior, nonspecific but  thought to be likely reactive. Vessels:  There are atherosclerotic calcifications within the aorta and its  branches. .    Body wall: Unremarkable    Musculoskeletal: Small right flank subcutaneous calcification, likely  dystrophic. Mild to moderate generalized body wall edema. Moderate to severe  degenerative changes of the lumbar spine. No acute fracture. Bilateral greater  trochanteric enthesopathy. Impression IMPRESSION:   1. Interval right percutaneous nephrostomy tube exchange and placement of a  right-sided nephroureteral stent, which appear appropriately positioned. 2.  Slight interval improvement of right-sided hydroureter however there is  persistent moderate right pelviectasis and mild to moderate proximal through mid  right hydroureter with periureteral stranding and nonspecific right perinephric  stranding. 3.  Punctate focus of intraluminal gas noted within the urinary bladder, favored  to be due to recent instrumentation although infection with gas-forming organism  is not excluded. Recommend correlation with clinical inflammatory markers and  urinalysis to exclude infection. 4.  Multiple bilateral renal cysts as well as subcentimeter hypoattenuating  bilateral renal lesions which are too small to accurately characterize. Suggest  further assessment with nonemergent renal ultrasound. 5.  Hepatosplenomegaly. Incomplete assessment of the hepatic dome due to  exclusion from the field-of-view, exam limitation. 6.  Trace nonspecific free fluid in the pelvis, which may be due to the right  renal inflammatory process or may be physiologic. 7.  Multiple new/increased subcentimeter retroperitoneal lymph nodes,  nonspecific but likely reactive. Recommend attention on follow-up imaging. Please see above for additional details. EKG No results found for this or any previous visit.      I have personally reviewed the old medical records and patient's labs    Plan / Recommendation:      1. crf stage 5,no indication for dialysis yet  2.hypotension,bp meds stopped,continue iv ns at 50 cc/hour  3.anemia,started epo. give venofer if blood cultures neg  4.possible pyelonephritis,cultures pending. adjust antibiotics for renal failure  5.sec hyperparathyroidism,hypocalcemia,on vitamin d and calcitriol    D/w family practice residents    Terral Opitz, MD  Nephrology  5/16/2020

## 2020-05-16 NOTE — PROGRESS NOTES
OCCUPATIONAL THERAPY EVALUATION/DISCHARGE    Patient: Justina Mcgarry [de-identified]66 y.o. female)  Date: 5/16/2020  Primary Diagnosis: Hypotension [I95.9]  Anemia [D64.9]  Hypotension [I95.9]  UTI (urinary tract infection) [N39.0]        Precautions:  Contact(Isolation droplet +)  PLOF: Mod I ADLs (sponge bathes at sinkside), Mod I functional mobility using RW    ASSESSMENT AND RECOMMENDATIONS:  Nursing/RN cleared for pt to participate in OT evaluation and tx session. Patient present performing bedside commode Mod I while managing IV pole, no LOB noted, Good standing balance during toilet hygiene with Mod I in stance. LB dressing: Mod I seated edge of bed to doff and don socks with good sitting balance. Patient reports hx BUE rotator cuff injuries AROM shoulder flexion ~ 110 degrees and functional for ADL tasks. Patient reports dtr in law in is a nurse and will provide set up for ADLs for safe d/c home. Call bell within reach & pt verbalized understanding to utilize for assist e.g. functional transfers in order to prevent falls. Skilled occupational therapy is not indicated at this time. Discharge Recommendations: Home Health  Further Equipment Recommendations for Discharge: bedside commode      SUBJECTIVE:   Patient stated My son is going to install my chair lift in his home.     OBJECTIVE DATA SUMMARY:     Past Medical History:   Diagnosis Date    Acidosis     Anemia     Arteriovenous fistula (HCC)     CKD (chronic kidney disease)     Diabetes ([facility] [address]     HLD (hyperlipidemia)     HTN (hypertension)     Hyperparathyroidism due to renal insufficiency (HCC)     Hypothyroid     Kidney stone     Lung mass     Recurrent UTI     Ureter, stricture     Uric acid nephrolithiasis     Urinary incontinence      Past Surgical History:   Procedure Laterality Date    HX APPENDECTOMY      HX CHOLECYSTECTOMY      HX GASTRIC BYPASS      HX KNEE ARTHROSCOPY      HX UROLOGICAL      right PCN placement    HX UROLOGICAL  07/23/2018    RIGHT URETEROSCOPY WITH HOLMIUM LASER    IR EXCHANGE NEPHRO PERC LT SI  2/21/2020    IR EXCHANGE NEPHRO PERC RT SI  4/13/2020    IR NEPHROURETERAL PERC RT PLC CATH NEW ACCESS  SI  4/30/2020     Barriers to Learning/Limitations: None  Compensate with: visual, verbal, tactile, kinesthetic cues/model    Home Situation:   Home Situation  Home Environment: Private residence  # Steps to Enter: 2  One/Two Story Residence: One story  Living Alone: No  Support Systems: Child(isaura), Family member(s)  Patient Expects to be Discharged to[de-identified] Private residence  Current DME Used/Available at Home: Kathrine Debby, rolling, Cane, straight  Tub or Shower Type: Tub/Shower combination(does not use)  [x]     Right hand dominant   []     Left hand dominant    Cognitive/Behavioral Status:  Neurologic State: Alert  Orientation Level: Oriented X4  Cognition: Follows commands  Safety/Judgement: Fall prevention    Skin: appears intact    Edema: none noted    Vision/Perceptual:  appears intact    Coordination: BUE  Coordination: Within functional limits(BUEs)  Fine Motor Skills-Upper: Left Intact; Right Intact    Gross Motor Skills-Upper: Left Intact; Right Intact    Balance:  Sitting: Intact  Sitting - Static: Good (unsupported)  Sitting - Dynamic: Good (unsupported)  Standing: Without support  Standing - Static: Good  Standing - Dynamic : Good    Strength: BUE  Strength: Generally decreased, functional(BUEs)    Tone & Sensation: BUE  Tone: Normal(BUEs)  Sensation: Intact(BUEs)     Range of Motion: BUE  AROM: Generally decreased, functional(BUEs)     Functional Mobility and Transfers for ADLs:  Bed Mobility:        Sit to Supine: Modified independent  Scooting: Modified independent  Transfers:  Sit to Stand: Modified independent  Stand to Sit: Modified independent     Toilet Transfer : Modified independent(bedside commode transfer)     ADL Assessment:  Feeding: Modified independent    Oral Facial Hygiene/Grooming: Modified Independent;Setup    Bathing: Modified independent;Setup    Upper Body Dressing: Modified independent;Setup    Lower Body Dressing: Modified independent;Setup    Toileting: Modified independent     ADL Intervention: Patient present performing bedside commode Mod I while managing IV pole, no LOB noted, Good standing balance during toilet hygiene with Mod I in stance. LB dressing: Mod I seated edge of bed to doff and don socks with good sitting balance. Cognitive Retraining  Safety/Judgement: Fall prevention    Pain:  Pain level pre-treatment: 0/10   Pain level post-treatment: 0/10   Pain Intervention(s): Medication (see MAR); Rest, Ice, Repositioning   Response to intervention: Nurse notified, See doc flow    Activity Tolerance:   fair  Please refer to the flowsheet for vital signs taken during this treatment. After treatment:   []  Patient left in no apparent distress sitting up in chair  [x]  Patient left in no apparent distress in bed  [x]  Call bell left within reach  [x]  Nursing notified  []  Caregiver present  []  Bed alarm activated    COMMUNICATION/EDUCATION:   [x]      Role of Occupational Therapy in the acute care setting  [x]      Home safety education was provided and the patient/caregiver indicated understanding. [x]      Patient/family have participated as able and agree with findings and recommendations. []      Patient is unable to participate in plan of care at this time. Thank you for this referral.  Lisa Gruber  Time Calculation: 30 mins      Eval Complexity: History: MEDIUM Complexity : Expanded review of history including physical, cognitive and psychosocial  history ; Examination: MEDIUM Complexity : 3-5 performance deficits relating to physical, cognitive , or psychosocial skils that result in activity limitations and / or participation restrictions; Decision Making:MEDIUM Complexity : Patient may present with comorbidities that affect occupational performnce.  Miniml to moderate modification of tasks or assistance (eg, physical or verbal ) with assesment(s) is necessary to enable patient to complete evaluation

## 2020-05-16 NOTE — PROGRESS NOTES
conducted an initial consultation and Spiritual Assessment for Stevie Hubbard, who is a 66 y.o.,female. Patients Primary Language is: [place]. According to the patients EMR Roman Catholic Affiliation is: Non Mandaen.     The reason the Patient came to the hospital is:   Patient Active Problem List    Diagnosis Date Noted    Anemia 05/15/2020    Hypotension 05/15/2020    UTI (urinary tract infection) 05/15/2020    Anemia of chronic renal failure 03/19/2020    Pyelonephritis 62/18/7177    Complicated urinary tract infection 02/20/2020        The  provided the following Interventions:  Initiated a relationship of care and support. Explored issues of valentina, belief, spirituality and Taoism/ritual needs while hospitalized. Listened empathically. Provided chaplaincy education. Provided information about Spiritual Care Services. Offered  assurance of continued prayers on patient's behalf. Chart reviewed. The following outcomes where achieved:  Patient shared limited information about  their medical narrative. Patient processed feeling about current hospitalization. Patient expressed gratitude for 's visit. Assessment:  Patient does not have any Taoism/cultural needs that will affect patients preferences in health care. There are no spiritual or Taoism issues which require intervention at this time. Plan:  Chaplains will continue to follow and will provide pastoral care on an as needed/requested basis.  recommends bedside caregivers page  on duty if patient shows signs of acute spiritual or emotional distress.         [id number] [facility].   [phone]

## 2020-05-16 NOTE — ROUTINE PROCESS
Report received from Priscilla and assumed patient care. 7:13 AM -Bedside shift change report given to MONTANA Blum (oncoming nurse) by Josafat Zeng RN (offgoing nurse). Report given with SBAR, Kardex, Intake/Output, MAR and Recent Results.

## 2020-05-17 PROBLEM — N18.5 CKD (CHRONIC KIDNEY DISEASE) STAGE 5, GFR LESS THAN 15 ML/MIN (HCC): Status: ACTIVE | Noted: 2020-05-17

## 2020-05-17 PROBLEM — E11.9 TYPE 2 DIABETES MELLITUS, WITHOUT LONG-TERM CURRENT USE OF INSULIN (HCC): Status: ACTIVE | Noted: 2020-05-17

## 2020-05-17 PROBLEM — E03.9 ACQUIRED HYPOTHYROIDISM: Status: ACTIVE | Noted: 2020-05-17

## 2020-05-17 PROBLEM — K21.9 GERD (GASTROESOPHAGEAL REFLUX DISEASE): Status: ACTIVE | Noted: 2020-05-17

## 2020-05-17 LAB
ANION GAP SERPL CALC-SCNC: 6 MMOL/L (ref 3–18)
BASOPHILS # BLD: 0 K/UL (ref 0–0.1)
BASOPHILS NFR BLD: 1 % (ref 0–2)
BUN SERPL-MCNC: 53 MG/DL (ref 7–18)
BUN/CREAT SERPL: 15 (ref 12–20)
CALCIUM SERPL-MCNC: 8.5 MG/DL (ref 8.5–10.1)
CHLORIDE SERPL-SCNC: 112 MMOL/L (ref 100–111)
CO2 SERPL-SCNC: 23 MMOL/L (ref 21–32)
CREAT SERPL-MCNC: 3.59 MG/DL (ref 0.6–1.3)
DIFFERENTIAL METHOD BLD: ABNORMAL
EOSINOPHIL # BLD: 0.3 K/UL (ref 0–0.4)
EOSINOPHIL NFR BLD: 4 % (ref 0–5)
ERYTHROCYTE [DISTWIDTH] IN BLOOD BY AUTOMATED COUNT: 15.8 % (ref 11.6–14.5)
GLUCOSE SERPL-MCNC: 84 MG/DL (ref 74–99)
HCT VFR BLD AUTO: 25.6 % (ref 35–45)
HGB BLD-MCNC: 8.3 G/DL (ref 12–16)
LYMPHOCYTES # BLD: 2.5 K/UL (ref 0.9–3.6)
LYMPHOCYTES NFR BLD: 39 % (ref 21–52)
MCH RBC QN AUTO: 31.3 PG (ref 24–34)
MCHC RBC AUTO-ENTMCNC: 32.4 G/DL (ref 31–37)
MCV RBC AUTO: 96.6 FL (ref 74–97)
MONOCYTES # BLD: 0.7 K/UL (ref 0.05–1.2)
MONOCYTES NFR BLD: 11 % (ref 3–10)
NEUTS SEG # BLD: 2.9 K/UL (ref 1.8–8)
NEUTS SEG NFR BLD: 45 % (ref 40–73)
PLATELET # BLD AUTO: 205 K/UL (ref 135–420)
PMV BLD AUTO: 10.7 FL (ref 9.2–11.8)
POTASSIUM SERPL-SCNC: 4.1 MMOL/L (ref 3.5–5.5)
RBC # BLD AUTO: 2.65 M/UL (ref 4.2–5.3)
SODIUM SERPL-SCNC: 141 MMOL/L (ref 136–145)
WBC # BLD AUTO: 6.5 K/UL (ref 4.6–13.2)

## 2020-05-17 PROCEDURE — 85025 COMPLETE CBC W/AUTO DIFF WBC: CPT

## 2020-05-17 PROCEDURE — 80048 BASIC METABOLIC PNL TOTAL CA: CPT

## 2020-05-17 PROCEDURE — 74011250636 HC RX REV CODE- 250/636: Performed by: STUDENT IN AN ORGANIZED HEALTH CARE EDUCATION/TRAINING PROGRAM

## 2020-05-17 PROCEDURE — 97161 PT EVAL LOW COMPLEX 20 MIN: CPT

## 2020-05-17 PROCEDURE — 36415 COLL VENOUS BLD VENIPUNCTURE: CPT

## 2020-05-17 PROCEDURE — 65660000000 HC RM CCU STEPDOWN

## 2020-05-17 PROCEDURE — 97530 THERAPEUTIC ACTIVITIES: CPT

## 2020-05-17 PROCEDURE — 74011000250 HC RX REV CODE- 250: Performed by: STUDENT IN AN ORGANIZED HEALTH CARE EDUCATION/TRAINING PROGRAM

## 2020-05-17 PROCEDURE — 74011250637 HC RX REV CODE- 250/637: Performed by: STUDENT IN AN ORGANIZED HEALTH CARE EDUCATION/TRAINING PROGRAM

## 2020-05-17 RX ORDER — CEPHALEXIN 250 MG/1
500 CAPSULE ORAL EVERY 8 HOURS
Status: DISCONTINUED | OUTPATIENT
Start: 2020-05-18 | End: 2020-05-18

## 2020-05-17 RX ORDER — TRAMADOL HYDROCHLORIDE 50 MG/1
25 TABLET ORAL
Status: ACTIVE | OUTPATIENT
Start: 2020-05-17 | End: 2020-05-18

## 2020-05-17 RX ORDER — CEPHALEXIN 250 MG/1
250 CAPSULE ORAL EVERY 8 HOURS
Status: DISCONTINUED | OUTPATIENT
Start: 2020-05-18 | End: 2020-05-17

## 2020-05-17 RX ORDER — ACETAMINOPHEN 325 MG/1
650 TABLET ORAL
Status: DISCONTINUED | OUTPATIENT
Start: 2020-05-17 | End: 2020-05-18 | Stop reason: HOSPADM

## 2020-05-17 RX ORDER — CEPHALEXIN 250 MG/1
500 CAPSULE ORAL EVERY 8 HOURS
Status: DISCONTINUED | OUTPATIENT
Start: 2020-05-18 | End: 2020-05-17

## 2020-05-17 RX ORDER — CEPHALEXIN 250 MG/1
500 CAPSULE ORAL EVERY 6 HOURS
Status: DISCONTINUED | OUTPATIENT
Start: 2020-05-17 | End: 2020-05-17

## 2020-05-17 RX ADMIN — ACETAMINOPHEN 650 MG: 325 TABLET ORAL at 09:00

## 2020-05-17 RX ADMIN — TAMSULOSIN HYDROCHLORIDE 0.4 MG: 0.4 CAPSULE ORAL at 09:00

## 2020-05-17 RX ADMIN — CALCITRIOL CAPSULES 0.25 MCG 0.25 MCG: 0.25 CAPSULE ORAL at 13:04

## 2020-05-17 RX ADMIN — Medication 1 CAPSULE: at 09:00

## 2020-05-17 RX ADMIN — LATANOPROST 1 DROP: 50 SOLUTION OPHTHALMIC at 21:18

## 2020-05-17 RX ADMIN — ACETAMINOPHEN 650 MG: 325 TABLET ORAL at 17:38

## 2020-05-17 RX ADMIN — LEVOTHYROXINE SODIUM 125 MCG: 125 TABLET ORAL at 07:42

## 2020-05-17 RX ADMIN — ALLOPURINOL 50 MG: 100 TABLET ORAL at 09:00

## 2020-05-17 RX ADMIN — SODIUM BICARBONATE 650 MG TABLET 1300 MG: at 17:37

## 2020-05-17 RX ADMIN — Medication 500 MG: at 09:00

## 2020-05-17 RX ADMIN — PANTOPRAZOLE 40 MG: 40 TABLET, DELAYED RELEASE ORAL at 09:00

## 2020-05-17 RX ADMIN — ACETAMINOPHEN 650 MG: 325 TABLET ORAL at 04:04

## 2020-05-17 RX ADMIN — SODIUM BICARBONATE 650 MG TABLET 1300 MG: at 21:18

## 2020-05-17 RX ADMIN — NEPHROCAP 1 CAPSULE: 1 CAP ORAL at 09:00

## 2020-05-17 RX ADMIN — CHOLECALCIFEROL TAB 25 MCG (1000 UNIT) 2 TABLET: 25 TAB at 09:00

## 2020-05-17 RX ADMIN — CEFEPIME 2 G: 2 INJECTION, POWDER, FOR SOLUTION INTRAVENOUS at 06:19

## 2020-05-17 RX ADMIN — SODIUM BICARBONATE 650 MG TABLET 1300 MG: at 09:00

## 2020-05-17 RX ADMIN — GABAPENTIN 100 MG: 100 CAPSULE ORAL at 21:18

## 2020-05-17 RX ADMIN — CHOLECALCIFEROL TAB 25 MCG (1000 UNIT) 2 TABLET: 25 TAB at 17:37

## 2020-05-17 RX ADMIN — ENOXAPARIN SODIUM 30 MG: 30 INJECTION SUBCUTANEOUS at 13:04

## 2020-05-17 NOTE — PROGRESS NOTES
Referral for Home health sent to [facility]. DME order for [facility] noted, however not covered by patient's insurance. Patient's son to transport home.        ABHISHEK Enriquez  Case Management  [phone]

## 2020-05-17 NOTE — PROGRESS NOTES
Problem: Pressure Injury - Risk of  Goal: *Prevention of pressure injury  Description: Document Giovany Scale and appropriate interventions in the flowsheet. Outcome: Progressing Towards Goal  Note: Pressure Injury Interventions:       Moisture Interventions: Absorbent underpads, Maintain skin hydration (lotion/cream)    Activity Interventions: Pressure redistribution bed/mattress(bed type)    Mobility Interventions: Pressure redistribution bed/mattress (bed type), HOB 30 degrees or less    Nutrition Interventions: Document food/fluid/supplement intake                     Problem: Patient Education: Go to Patient Education Activity  Goal: Patient/Family Education  Outcome: Progressing Towards Goal     Problem: Falls - Risk of  Goal: *Absence of Falls  Description: Document Freddy Fall Risk and appropriate interventions in the flowsheet.   Outcome: Progressing Towards Goal  Note: Fall Risk Interventions:  Mobility Interventions: Assess mobility with egress test, Communicate number of staff needed for ambulation/transfer, Patient to call before getting OOB, PT Consult for mobility concerns, PT Consult for assist device competence, Utilize walker, cane, or other assistive device         Medication Interventions: Patient to call before getting OOB, Teach patient to arise slowly, Evaluate medications/consider consulting pharmacy    Elimination Interventions: Call light in reach, Patient to call for help with toileting needs    History of Falls Interventions: Evaluate medications/consider consulting pharmacy         Problem: Patient Education: Go to Patient Education Activity  Goal: Patient/Family Education  Outcome: Progressing Towards Goal     Problem: Urinary Tract Infection - Adult  Goal: *Absence of infection signs and symptoms  Outcome: Progressing Towards Goal     Problem: Patient Education: Go to Patient Education Activity  Goal: Patient/Family Education  Outcome: Progressing Towards Goal     Problem: Anemia Care Plan (Adult and Pediatric)  Goal: *Labs within defined limits  Outcome: Progressing Towards Goal  Goal: *Tolerates increased activity  Outcome: Progressing Towards Goal     Problem: Patient Education: Go to Patient Education Activity  Goal: Patient/Family Education  Outcome: Progressing Towards Goal

## 2020-05-17 NOTE — PROGRESS NOTES
[facility]      Patient: Alon Fajardo MRN: [id number]  CSN: [id number]    YOB: 1945  Age: 66 y.o. Sex: female      Admission Date: 5/15/2020       HPI:     Alon Fajardo is a 66 y.o. female with PMH CKD Stage 5, hx of recurrent MDR UTIs/stones s/p R nephrostomy tube (06/20), HTN, DM II w/ neuropathy, anemia, galucoma, GERD, now presenting with complaint of weakness, nausea, right flank and back pain. Patient without pain currently. No fevers/chills/CVA pain. Feels better overall, including anxiety post pastoral care. She state that with movement her right side pain is 70% better. Would like to go home if possible. Past Medical History:   Diagnosis Date    Acidosis     Anemia     Arteriovenous fistula (HCC)     CKD (chronic kidney disease)     Diabetes (HCC)     HLD (hyperlipidemia)     HTN (hypertension)     Hyperparathyroidism due to renal insufficiency (HCC)     Hypothyroid     Kidney stone     Lung mass     Recurrent UTI     Ureter, stricture     Uric acid nephrolithiasis     Urinary incontinence        Past Surgical History:   Procedure Laterality Date    HX APPENDECTOMY      HX CHOLECYSTECTOMY      HX GASTRIC BYPASS      HX KNEE ARTHROSCOPY      HX UROLOGICAL      right PCN placement    HX UROLOGICAL  07/23/2018    RIGHT URETEROSCOPY WITH HOLMIUM LASER    IR EXCHANGE NEPHRO PERC LT SI  2/21/2020    IR EXCHANGE NEPHRO PERC RT SI  4/13/2020    IR NEPHROURETERAL PERC RT PLC CATH NEW ACCESS  SI  4/30/2020       History reviewed. No pertinent family history.     Social History     Socioeconomic History    Marital status: SINGLE     Spouse name: Not on file    Number of children: Not on file    Years of education: Not on file    Highest education level: Not on file   Tobacco Use    Smoking status: Never Smoker    Smokeless tobacco: Never Used   Substance and Sexual Activity    Alcohol use: Never     Frequency: Never    Drug use: Never Allergies   Allergen Reactions    Ciprofloxacin Hives    Statins-Hmg-Coa Reductase Inhibitors Other (comments)     Body ache       Prior to Admission Medications   Prescriptions Last Dose Informant Patient Reported? Taking?   acetaminophen (TYLENOL) 325 mg tablet Unknown at Unknown time  Yes No   Sig: Take 650 mg by mouth two (2) times a day. allopurinoL (ZYLOPRIM) 100 mg tablet Unknown at Unknown time  Yes No   Sig: Take 200 mg by mouth daily. amLODIPine (NORVASC) 5 mg tablet Unknown at Unknown time  Yes No   Sig: Take 5 mg by mouth daily. ascorbic acid, vitamin C, (VITAMIN C) 500 mg tablet Unknown at Unknown time  Yes No   Sig: Take 500 mg by mouth.   calcitRIOL (ROCALTROL) 0.25 mcg capsule Unknown at Unknown time  Yes No   Sig: Take 0.25 mcg by mouth every other day. carvediloL (COREG) 12.5 mg tablet Unknown at Unknown time  Yes No   Sig: Take  by mouth two (2) times daily (with meals). cholecalciferol (VITAMIN D3) (2,000 UNITS /50 MCG) cap capsule Unknown at Unknown time  Yes No   Sig: Take 2,000 Units by mouth two (2) times a day. Take two tabs a total of 4000 units   cyclobenzaprine (FLEXERIL) 5 mg tablet Unknown at Unknown time  Yes No   Sig: Take 5 mg by mouth daily. fluticasone propionate (FLONASE) 50 mcg/actuation nasal spray Unknown at Unknown time  No No   Si sprays in each nostril daily   lactobacillus sp. 50 billion cpu (BIO-K PLUS) 50 billion cell -375 mg cap capsule Unknown at Unknown time  No No   Sig: Take 1 Cap by mouth daily. latanoprost (XALATAN) 0.005 % ophthalmic solution Unknown at Unknown time  Yes No   Sig: Administer 1 Drop to both eyes nightly. One drop at bedtime   levothyroxine (SYNTHROID) 125 mcg tablet Unknown at Unknown time  Yes No   Sig: Take 125 mcg by mouth Daily (before breakfast). omeprazole (PRILOSEC) 20 mg capsule Unknown at Unknown time  Yes No   Sig: Take 20 mg by mouth daily.    ondansetron (ZOFRAN ODT) 4 mg disintegrating tablet Unknown at Unknown time  Yes No   Sig: Take 4 mg by mouth every eight (8) hours as needed for Nausea or Vomiting.   sodium bicarbonate 650 mg tablet Unknown at Unknown time  No No   Sig: Take 2 Tabs by mouth three (3) times daily. Indications: excess body acid   tamsulosin (FLOMAX) 0.4 mg capsule Unknown at Unknown time  No No   Sig: Take 1 Cap by mouth daily. vit B Cmplx 3-FA-Vit C-Biotin (NEPHRO HOWIE RX) 1- mg-mg-mcg tablet Unknown at Unknown time  Yes No   Sig: Take 1 Tab by mouth daily. Facility-Administered Medications: None       Physical Exam:     Patient Vitals for the past 24 hrs:   Temp Pulse Resp BP SpO2   05/17/20 0934 97.7 °F (36.5 °C) 81 20 125/66 100 %   05/17/20 0348 98.5 °F (36.9 °C) 82 24 136/64 99 %   05/17/20 0047 97.2 °F (36.2 °C) 82 20 110/57 96 %   05/16/20 2004 98.6 °F (37 °C) 82 18 105/55 97 %   05/16/20 1625 98.2 °F (36.8 °C) 83 20 90/42 97 %       Physical Exam:  General:  AAOx3, NAD   HEENT:  No other gross abnormalities present. CV:  RRR, no murmurs. RESP:  Unlabored breathing. Lungs clear to auscultation without adventitious breath sounds. ABD:  Soft, non-distended,  Obese  MS:  No joint deformity   Back: Right sided nephrostomy tube. C/d/i. No drainage on bandage covering. Unable to reproduce pain on palpation. Neuro:  Grossly intact  Ext:  No edema. Skin:  No rashes, lesions, or ulcers. Good turgor.       Chemistry Recent Labs     05/17/20  0315 05/16/20  0314 05/15/20  0620   GLU 84 84 100*    144 140   K 4.1 4.8 4.6   * 114* 111   CO2 23 23 23   BUN 53* 55* 51*   CREA 3.59* 3.72* 3.83*   CA 8.5 8.6 8.4*   AGAP 6 7 6   BUCR 15 15 13   AP  --   --  141*   TP  --   --  6.9   ALB  --   --  2.7*   GLOB  --   --  4.2*   AGRAT  --   --  0.6*        CBC w/Diff Recent Labs     05/17/20 0315 05/16/20  0314 05/15/20  0620   WBC 6.5 7.5 8.5   RBC 2.65* 2.64* 2.51*   HGB 8.3* 8.1* 7.8*   HCT 25.6* 25.6* 24.4*    164 151   GRANS 45 64 71   LYMPH 39 22 18*   EOS 4 2 0        Liver Enzymes Protein, total   Date Value Ref Range Status   05/15/2020 6.9 6.4 - 8.2 g/dL Final     Albumin   Date Value Ref Range Status   05/15/2020 2.7 (L) 3.4 - 5.0 g/dL Final     Globulin   Date Value Ref Range Status   05/15/2020 4.2 (H) 2.0 - 4.0 g/dL Final     A-G Ratio   Date Value Ref Range Status   05/15/2020 0.6 (L) 0.8 - 1.7   Final     AST (SGOT)   Date Value Ref Range Status   05/15/2020 17 10 - 38 U/L Final     Alk. phosphatase   Date Value Ref Range Status   05/15/2020 141 (H) 45 - 117 U/L Final     Recent Labs     05/15/20  0620   TP 6.9   ALB 2.7*   GLOB 4.2*   AGRAT 0.6*   SGOT 17   *        Lactic Acid Lactic acid   Date Value Ref Range Status   05/15/2020 1.1 0.4 - 2.0 MMOL/L Final     Recent Labs     05/15/20  0829   LAC 1.1          Urinalysis Lab Results   Component Value Date/Time    Color YELLOW 05/15/2020 11:28 AM    Appearance CLOUDY 05/15/2020 11:28 AM    Specific gravity 1.015 05/15/2020 11:28 AM    pH (UA) 6.5 05/15/2020 11:28 AM    Protein 100 (A) 05/15/2020 11:28 AM    Glucose Negative 05/15/2020 11:28 AM    Ketone Negative 05/15/2020 11:28 AM    Bilirubin Negative 05/15/2020 11:28 AM    Urobilinogen 1.0 05/15/2020 11:28 AM    Nitrites Negative 05/15/2020 11:28 AM    Leukocyte Esterase LARGE (A) 05/15/2020 11:28 AM    Epithelial cells 1+ 05/15/2020 11:28 AM    Bacteria 1+ (A) 05/15/2020 11:28 AM    WBC TOO NUMEROUS TO COUNT 05/15/2020 11:28 AM    RBC  05/15/2020 11:28 AM     UNABLE TO QUANTITATE MICROSCOPIC PARAMETERS DUE TO EXCESSIVE WBCS        Imaging:  XR (Most Recent). Results from [place] encounter on 05/15/20   XR CHEST PORT    Narrative EXAM: XR CHEST PORT    INDICATION: 66 years Female. Sepsis. ADDITIONAL HISTORY: None. TECHNIQUE: Frontal view of the chest.    COMPARISON: Chest radiograph 2/21/2020    FINDINGS:    Chronic elevation of the right hemidiaphragm noted. The cardiac silhouette is at the upper limits of normal in size.  There is  tortuosity of the aorta. There is no confluent consolidation. There is no evidence of pleural effusion. There is no evidence of pneumothorax. Osseous structures are unchanged in appearance. There are several chronic healed  right posterior rib fractures. Degenerative changes of the thoracic spine and  shoulders noted. Prior right distal clavicle resection. Epigastric surgical  clips are again noted. Impression IMPRESSION:  No radiographic evidence of acute cardiopulmonary process. CT (Most Recent) Results from Hospital Encounter encounter on 05/15/20   CT ABD PELV WO CONT    Narrative EXAM: CT ABD PELV WO CONT    CLINICAL INDICATION/HISTORY: 66 years Female. abd pain   ADDITIONAL HISTORY: Fever, vomiting, diarrhea for 2 days. Right lower  abdominal/back pain. TECHNIQUE: CT of the abdomen and pelvis without IV contrast. Sagittal and  coronal reformats were created. All CT scans at this facility are performed using dose optimization technique as  appropriate to a performed exam, to include automated exposure control,  adjustment of the mA and/or kV according to patient size (including appropriate  matching for site specific examination) or use of iterative reconstruction  technique. IV CONTRAST: None    COMPARISON: CT abdomen/pelvis 4/13/2020    FINDINGS:   Limitations: There is limited evaluation of solid organs and vasculature due to  lack of intravenous contrast.     Lower Chest: Subsegmental atelectasis in the posterior medial right lower lobe. No suspicious pulmonary nodule or yun consolidation is seen. No pleural  effusion. Normal heart size. Coronary artery atherosclerotic calcifications are  present. Mesentery/Peritoneum: No free intraperitoneal air. Trace free fluid noted within  the pelvis. Liver: The hepatic dome is partially excluded from the field-of-view and  incompletely assessed. Liver appears enlarged. Gallbladder/biliary: Status post cholecystectomy. No intrahepatic or extra  hepatic biliary ductal dilatation appreciated. Pancreas: Punctate calcification at the level of the pancreatic uncinate  process, likely sequela of prior pancreatitis. Otherwise unremarkable. Spleen: The spleen measures 14 cm craniocaudad, enlarged. Accessory splenule  noted. Adrenal Glands: Unremarkable    Kidneys: There has been interval replacement of the previously described  percutaneous nephrostomy tube. A percutaneous nephrostomy tube is noted with tip  extending into the renal sinus. There is also a right-sided double-J  nephroureteral stent which extends from the renal pelvis to the urinary bladder. There is persistent moderate dilatation of the right renal pelvis. Mild to  moderate right-sided hydroureter within the proximal/mid ureter. There is also  proximal through mid periureteral fat stranding with mural thickening. Multiple bilateral renal cysts are noted. Some hypoattenuating lesions in the  bilateral kidneys measure less than a centimeter and are too small to accurately  characterize but statistically likely reflect cysts. No renal or ureteral  calculi as visualized. Urinary Bladder: Punctate focus of intraluminal gas noted in the anterior aspect  of the urinary bladder, thought to be most likely secondary to recent  instrumentation, versus less likely infection with gas-forming organism. No  significant mural thickening is seen. Other Pelvic Organs: No suspicious adnexal mass appreciated. Bowel: Multiple surgical clips noted around the stomach. Prior Debby-en-Y gastric  bypass, without definite evidence of complication. Small bowel gas is noted in  the excluded stomach, nonspecific. Moderate sigmoid diverticulosis without  evidence of acute diverticulitis. Portions of the colon are underdistended and  suboptimally assessed. No pericolonic stranding or mural thickening appreciated.   The appendix is not confidently visualized; however there are no secondary  findings to suggest acute appendicitis. Tiny hiatal hernia. Small bowel is  normal in caliber and distribution. Lymph Nodes: No lymphadenopathy by size criteria. Multiple subcentimeter  para-aortic, aortocaval, and paracaval lymph nodes which measure less than a  centimeter short axis, which are increased in size from prior, nonspecific but  thought to be likely reactive. Vessels: There are atherosclerotic calcifications within the aorta and its  branches. .    Body wall: Unremarkable    Musculoskeletal: Small right flank subcutaneous calcification, likely  dystrophic. Mild to moderate generalized body wall edema. Moderate to severe  degenerative changes of the lumbar spine. No acute fracture. Bilateral greater  trochanteric enthesopathy. Impression IMPRESSION:   1. Interval right percutaneous nephrostomy tube exchange and placement of a  right-sided nephroureteral stent, which appear appropriately positioned. 2.  Slight interval improvement of right-sided hydroureter however there is  persistent moderate right pelviectasis and mild to moderate proximal through mid  right hydroureter with periureteral stranding and nonspecific right perinephric  stranding. 3.  Punctate focus of intraluminal gas noted within the urinary bladder, favored  to be due to recent instrumentation although infection with gas-forming organism  is not excluded. Recommend correlation with clinical inflammatory markers and  urinalysis to exclude infection. 4.  Multiple bilateral renal cysts as well as subcentimeter hypoattenuating  bilateral renal lesions which are too small to accurately characterize. Suggest  further assessment with nonemergent renal ultrasound. 5.  Hepatosplenomegaly. Incomplete assessment of the hepatic dome due to  exclusion from the field-of-view, exam limitation. 6.  Trace nonspecific free fluid in the pelvis, which may be due to the right  renal inflammatory process or may be physiologic.   7. Multiple new/increased subcentimeter retroperitoneal lymph nodes,  nonspecific but likely reactive. Recommend attention on follow-up imaging. Please see above for additional details. Recent Results (from the past 12 hour(s))   METABOLIC PANEL, BASIC    Collection Time: 05/17/20  3:15 AM   Result Value Ref Range    Sodium 141 136 - 145 mmol/L    Potassium 4.1 3.5 - 5.5 mmol/L    Chloride 112 (H) 100 - 111 mmol/L    CO2 23 21 - 32 mmol/L    Anion gap 6 3.0 - 18 mmol/L    Glucose 84 74 - 99 mg/dL    BUN 53 (H) 7.0 - 18 MG/DL    Creatinine 3.59 (H) 0.6 - 1.3 MG/DL    BUN/Creatinine ratio 15 12 - 20      GFR est AA 15 (L) >60 ml/min/1.73m2    GFR est non-AA 12 (L) >60 ml/min/1.73m2    Calcium 8.5 8.5 - 10.1 MG/DL   CBC WITH AUTOMATED DIFF    Collection Time: 05/17/20  3:15 AM   Result Value Ref Range    WBC 6.5 4.6 - 13.2 K/uL    RBC 2.65 (L) 4.20 - 5.30 M/uL    HGB 8.3 (L) 12.0 - 16.0 g/dL    HCT 25.6 (L) 35.0 - 45.0 %    MCV 96.6 74.0 - 97.0 FL    MCH 31.3 24.0 - 34.0 PG    MCHC 32.4 31.0 - 37.0 g/dL    RDW 15.8 (H) 11.6 - 14.5 %    PLATELET 337 576 - 812 K/uL    MPV 10.7 9.2 - 11.8 FL    NEUTROPHILS 45 40 - 73 %    LYMPHOCYTES 39 21 - 52 %    MONOCYTES 11 (H) 3 - 10 %    EOSINOPHILS 4 0 - 5 %    BASOPHILS 1 0 - 2 %    ABS. NEUTROPHILS 2.9 1.8 - 8.0 K/UL    ABS. LYMPHOCYTES 2.5 0.9 - 3.6 K/UL    ABS. MONOCYTES 0.7 0.05 - 1.2 K/UL    ABS. EOSINOPHILS 0.3 0.0 - 0.4 K/UL    ABS. BASOPHILS 0.0 0.0 - 0.1 K/UL    DF AUTOMATED         Assessment/Plan:   66 y.o. female with PMH CKD Stage 5, hx of recurrent MDR UTIs/stones s/p R nephrostomy tube (06/20), HTN, DM II w/ neuropathy, anemia, galucoma, GERD, now admitted with complicated UTI     Complicated UTI/Hx of recurrent UTI/nephrolithiasis w/MDR s/p R Nephrostomy tube placement 2019  -telemetry  - Continue Cefepime for now, will f/u ID recs on ABX and possible transition to PO today.   - FU urology recs  - IVF at 25ml/hr   - I&Os  - F/U UCx and blood cxs - Continue Probiotics (patient has hx of c diff with ABX)  - Continue home Allopurinol for hx uric acid stones- decreased dose to 50mg per nephro recs  - Continue home Flomax  - VS per unit routine  - Daily daily CBC/BMP  - PT/OT/CM     CKD Stage 5   Cr 3.83 upon admission; improving slowly baseline ~3.2. Follows with Dr. Eula Mabry. Patient states she has had discussions with him about starting dialysis in the next month or so. -nephrology, appreciate recs   - Trend Cr with daily BMP  - Renally dose meds, avoid nephrotoxic drugs    Anemia of Chronic Disease likely 2/2 to her CKD Stage 5. Last iron study 5/8/2020: Iron 72, TIBC 263, Iron saturation 27, Ferritin 70. HgB 7.8 on admission, no signs of active bleeding. Patient on Procrit and Venofer outpatient. Stable. - Monitor daily CBC   - monitor for signs of bleeding      Chronic Metabolic Acidosis likely 2/2 to her CKD Stage 5  - Continue on home NaHCO2 650mg two tablets TID     Chronic HTN with soft Bps on admission: SBPs in 90's-100's since arrival to ED. Seemed to improve somewhat with fluid recussitation  - Hold home Amlodipine and Coreg for now, will restart as blood pressure tolerates  - Monitor BP closely     Hx of DM II w/ neuropathy  Last HbA1C <3.5% 2/21/2020.  Takes Gabapentin 100mg QHS for neuropathy  - Continue to monitor BG thorough daily BMPs   - Continue home Gabapentin     GERD - chronic hx  On Omeprazole at home  - Protonix 40mg qday while admitted     Hypothyroidism - chronic   - Continue home Synthroid 125 mcg po qhs    Glaucoma  - continue home [address] appreciated.     Diet Renal Diet    DVT Prophylaxis Lovenox- renal dosing   GI Prophylaxis Protonix   Code status DNR/DNI   Disposition Telemetry      Point of Contact Kiko Stern   Relationship:  [facility]      May 17, 2020, 1:04 PM

## 2020-05-17 NOTE — ROUTINE PROCESS
Verbal bedside shift report given to JOSE ANTONIO Mcdaniel coming nurse by Jose Villarreal RN off going nurse including [facility], [facility] and [facility]

## 2020-05-17 NOTE — PROGRESS NOTES
RENAL DAILY PROGRESS NOTE    Patient: Ning Hurtado               Sex: female          DOA: 5/15/2020  5:39 AM        YOB: 1945      Age:  66 y.o.        LOS:  LOS: 2 days     Subjective:     Ning Hurtado is a 66 y.o.  who presents with Hypotension [I95.9]  Anemia [D64.9]  Hypotension [I95.9]  UTI (urinary tract infection) [N39.0]. Asked to evaluate for crf stage 5,admitted with pyelonephritis. has reccurent uti,renal stones,s/p r ureteral stent  Chief complains: she feels better since admission,no flank pain,fever,chills,no sob  - Reviewed last 24 hrs events     Current Facility-Administered Medications   Medication Dose Route Frequency    acetaminophen (TYLENOL) tablet 650 mg  650 mg Oral Q6H PRN    traMADoL (ULTRAM) tablet 25 mg  25 mg Oral ONCE PRN    [START ON 5/18/2020] cephALEXin (KEFLEX) capsule 500 mg  500 mg Oral Q8H    sodium chloride (NS) flush 5-10 mL  5-10 mL IntraVENous PRN    acetaminophen (TYLENOL) tablet 650 mg  650 mg Oral BID    [Held by provider] amLODIPine (NORVASC) tablet 5 mg  5 mg Oral DAILY    ascorbic acid (vitamin C) (VITAMIN C) tablet 500 mg  500 mg Oral DAILY    calcitRIOL (ROCALTROL) capsule 0.25 mcg  0.25 mcg Oral EVERY OTHER DAY    [Held by provider] carvediloL (COREG) tablet 12.5 mg  12.5 mg Oral BID WITH MEALS    cholecalciferol (VITAMIN D3) (1000 Units /25 mcg) tablet 2 Tab  2,000 Units Oral BID    lactobacillus sp. 50 billion cpu (BIO-K PLUS) capsule 1 Cap  1 Cap Oral DAILY    latanoprost (XALATAN) 0.005 % ophthalmic solution 1 Drop  1 Drop Both Eyes QHS    levothyroxine (SYNTHROID) tablet 125 mcg  125 mcg Oral ACB    pantoprazole (PROTONIX) tablet 40 mg  40 mg Oral DAILY    ondansetron (ZOFRAN ODT) tablet 4 mg  4 mg Oral Q8H PRN    sodium bicarbonate tablet 1,300 mg  1,300 mg Oral TID    tamsulosin (FLOMAX) capsule 0.4 mg  0.4 mg Oral DAILY    B complex-vitaminC-folic acid (NEPHROCAP) cap  1 Cap Oral DAILY    enoxaparin (LOVENOX) injection 30 mg  30 mg SubCUTAneous Q24H    0.9% sodium chloride infusion  25 mL/hr IntraVENous CONTINUOUS    epoetin caitlin-epbx (RETACRIT) injection 10,000 Units  10,000 Units SubCUTAneous Q MON, WED & FRI    gabapentin (NEURONTIN) capsule 100 mg  100 mg Oral QHS    allopurinoL (ZYLOPRIM) tablet 50 mg  50 mg Oral DAILY       Objective:     Visit Vitals  /66 (BP 1 Location: Right arm, BP Patient Position: At rest)   Pulse 81   Temp 97.7 °F (36.5 °C)   Resp 20   Ht 5' 2\" (1.575 m)   Wt 113.4 kg (250 lb)   SpO2 100%   Breastfeeding No   BMI 45.73 kg/m²       Intake/Output Summary (Last 24 hours) at 5/17/2020 1624  Last data filed at 5/17/2020 9279  Gross per 24 hour   Intake 670 ml   Output --   Net 670 ml       Physical Examination:   Talked over phone due to covid rule out    Data Review:      Labs:     Hematology:   Recent Labs     05/17/20 0315 05/16/20  0314 05/15/20  0620   WBC 6.5 7.5 8.5   HGB 8.3* 8.1* 7.8*   HCT 25.6* 25.6* 24.4*     Chemistry:   Recent Labs     05/17/20 0315 05/16/20  0314 05/15/20  0620   BUN 53* 55* 51*   CREA 3.59* 3.72* 3.83*   CA 8.5 8.6 8.4*   ALB  --   --  2.7*   K 4.1 4.8 4.6    144 140   * 114* 111   CO2 23 23 23   GLU 84 84 100*        Images:    XR (Most Recent). CXR reviewed by me and compared with previous CXR Results from Hospital Encounter encounter on 05/15/20   XR CHEST PORT    Narrative EXAM: XR CHEST PORT    INDICATION: 66 years Female. Sepsis. ADDITIONAL HISTORY: None. TECHNIQUE: Frontal view of the chest.    COMPARISON: Chest radiograph 2/21/2020    FINDINGS:    Chronic elevation of the right hemidiaphragm noted. The cardiac silhouette is at the upper limits of normal in size. There is  tortuosity of the aorta. There is no confluent consolidation. There is no evidence of pleural effusion. There is no evidence of pneumothorax. Osseous structures are unchanged in appearance.  There are several chronic healed  right posterior rib fractures. Degenerative changes of the thoracic spine and  shoulders noted. Prior right distal clavicle resection. Epigastric surgical  clips are again noted. Impression IMPRESSION:  No radiographic evidence of acute cardiopulmonary process. CT (Most Recent) Results from Hospital Encounter encounter on 05/15/20   CT ABD PELV WO CONT    Narrative EXAM: CT ABD PELV WO CONT    CLINICAL INDICATION/HISTORY: 66 years Female. abd pain   ADDITIONAL HISTORY: Fever, vomiting, diarrhea for 2 days. Right lower  abdominal/back pain. TECHNIQUE: CT of the abdomen and pelvis without IV contrast. Sagittal and  coronal reformats were created. All CT scans at this facility are performed using dose optimization technique as  appropriate to a performed exam, to include automated exposure control,  adjustment of the mA and/or kV according to patient size (including appropriate  matching for site specific examination) or use of iterative reconstruction  technique. IV CONTRAST: None    COMPARISON: CT abdomen/pelvis 4/13/2020    FINDINGS:   Limitations: There is limited evaluation of solid organs and vasculature due to  lack of intravenous contrast.     Lower Chest: Subsegmental atelectasis in the posterior medial right lower lobe. No suspicious pulmonary nodule or yun consolidation is seen. No pleural  effusion. Normal heart size. Coronary artery atherosclerotic calcifications are  present. Mesentery/Peritoneum: No free intraperitoneal air. Trace free fluid noted within  the pelvis. Liver: The hepatic dome is partially excluded from the field-of-view and  incompletely assessed. Liver appears enlarged. Gallbladder/biliary: Status post cholecystectomy. No intrahepatic or extra  hepatic biliary ductal dilatation appreciated. Pancreas: Punctate calcification at the level of the pancreatic uncinate  process, likely sequela of prior pancreatitis. Otherwise unremarkable. Spleen:  The spleen measures 14 cm craniocaudad, enlarged. Accessory splenule  noted. Adrenal Glands: Unremarkable    Kidneys: There has been interval replacement of the previously described  percutaneous nephrostomy tube. A percutaneous nephrostomy tube is noted with tip  extending into the renal sinus. There is also a right-sided double-J  nephroureteral stent which extends from the renal pelvis to the urinary bladder. There is persistent moderate dilatation of the right renal pelvis. Mild to  moderate right-sided hydroureter within the proximal/mid ureter. There is also  proximal through mid periureteral fat stranding with mural thickening. Multiple bilateral renal cysts are noted. Some hypoattenuating lesions in the  bilateral kidneys measure less than a centimeter and are too small to accurately  characterize but statistically likely reflect cysts. No renal or ureteral  calculi as visualized. Urinary Bladder: Punctate focus of intraluminal gas noted in the anterior aspect  of the urinary bladder, thought to be most likely secondary to recent  instrumentation, versus less likely infection with gas-forming organism. No  significant mural thickening is seen. Other Pelvic Organs: No suspicious adnexal mass appreciated. Bowel: Multiple surgical clips noted around the stomach. Prior Debby-en-Y gastric  bypass, without definite evidence of complication. Small bowel gas is noted in  the excluded stomach, nonspecific. Moderate sigmoid diverticulosis without  evidence of acute diverticulitis. Portions of the colon are underdistended and  suboptimally assessed. No pericolonic stranding or mural thickening appreciated. The appendix is not confidently visualized; however there are no secondary  findings to suggest acute appendicitis. Tiny hiatal hernia. Small bowel is  normal in caliber and distribution. Lymph Nodes: No lymphadenopathy by size criteria.  Multiple subcentimeter  para-aortic, aortocaval, and paracaval lymph nodes which measure less than a  centimeter short axis, which are increased in size from prior, nonspecific but  thought to be likely reactive. Vessels: There are atherosclerotic calcifications within the aorta and its  branches. .    Body wall: Unremarkable    Musculoskeletal: Small right flank subcutaneous calcification, likely  dystrophic. Mild to moderate generalized body wall edema. Moderate to severe  degenerative changes of the lumbar spine. No acute fracture. Bilateral greater  trochanteric enthesopathy. Impression IMPRESSION:   1. Interval right percutaneous nephrostomy tube exchange and placement of a  right-sided nephroureteral stent, which appear appropriately positioned. 2.  Slight interval improvement of right-sided hydroureter however there is  persistent moderate right pelviectasis and mild to moderate proximal through mid  right hydroureter with periureteral stranding and nonspecific right perinephric  stranding. 3.  Punctate focus of intraluminal gas noted within the urinary bladder, favored  to be due to recent instrumentation although infection with gas-forming organism  is not excluded. Recommend correlation with clinical inflammatory markers and  urinalysis to exclude infection. 4.  Multiple bilateral renal cysts as well as subcentimeter hypoattenuating  bilateral renal lesions which are too small to accurately characterize. Suggest  further assessment with nonemergent renal ultrasound. 5.  Hepatosplenomegaly. Incomplete assessment of the hepatic dome due to  exclusion from the field-of-view, exam limitation. 6.  Trace nonspecific free fluid in the pelvis, which may be due to the right  renal inflammatory process or may be physiologic. 7.  Multiple new/increased subcentimeter retroperitoneal lymph nodes,  nonspecific but likely reactive. Recommend attention on follow-up imaging. Please see above for additional details. EKG No results found for this or any previous visit.      I have personally reviewed the old medical records and patient's labs    Plan / Recommendation:      1. crf stage 5,no indication for dialysis yet  2.hypotension,bp meds stopped,resolved. stop ivf  3.anemia,started epo. give venofer if blood cultures neg  4. ? pyelonephritis,cultures neg.adjust antibiotics for renal failure  5.sec hyperparathyroidism,hypocalcemia,on vitamin d and calcitriol  5.metabolic acidosis,on bic tabs    D/w family practice residents    Oumou Singer MD  Nephrology  5/17/2020

## 2020-05-17 NOTE — PROGRESS NOTES
PHYSICAL THERAPY EVALUATION AND DISCHARGE    Patient: Lulú Nunez [de-identified]66 y.o. female)  Date: 5/17/2020  Primary Diagnosis: Hypotension [I95.9]  Anemia [D64.9]  Hypotension [I95.9]  UTI (urinary tract infection) [N39.0]        Precautions:   (droplet plus)  PLOF: Patient reports she was independent with self care and function mobility using RW. She lives with her supportive son in two story home. She has walker, rollator, cane, shower bench, bedside commode, and chair lift( which her son in planning installing soon). ASSESSMENT :  RN cleared pt for skilled PT. Patient semi reclined in bed agreeable to participate in PT session. Patient performs bed mobility and transfers at modified independent/supervision level. Pt expresses she has been dealing with SOB for a while and is incontinent, wearing depends. Educated pt on energy conservation and activity pacing; she verbalizes understanding. Also educated pt on option using bedside commode at night. Gait 40 ft in room using RW at modified independent level, c/o mild SOB on RA. Based on the objective data described below, the patient is at her baseline level of mobility. Patient is safe from mobility stand point for discharge to home with supportive family when medically cleared. Patient does not require further skilled intervention at this level of care; will discharge from PT caseload. PLAN :  Recommendations and Planned Interventions:   No formal PT needs identified at this time. Discharge Recommendations: None  Further Equipment Recommendations for Discharge: N/A     SUBJECTIVE:   Patient stated  I [place] a chair lift [place] that my son is doing to install in his home.     OBJECTIVE DATA SUMMARY:     Past Medical History:   Diagnosis Date    Acidosis     Anemia     Arteriovenous fistula (HCC)     CKD (chronic kidney disease)     Diabetes ([facility] [address]     HLD (hyperlipidemia)     HTN (hypertension)     Hyperparathyroidism due to renal insufficiency ([email or web address]     Hypothyroid     Kidney stone     Lung mass     Recurrent UTI     Ureter, stricture     Uric acid nephrolithiasis     Urinary incontinence      Past Surgical History:   Procedure Laterality Date    HX APPENDECTOMY      HX CHOLECYSTECTOMY      HX GASTRIC BYPASS      HX KNEE ARTHROSCOPY      HX UROLOGICAL      right PCN placement    HX UROLOGICAL  07/23/2018    RIGHT URETEROSCOPY WITH HOLMIUM LASER    IR EXCHANGE NEPHRO PERC LT SI  2/21/2020    IR EXCHANGE NEPHRO PERC RT SI  4/13/2020    IR NEPHROURETERAL PERC RT PLC CATH NEW ACCESS  SI  4/30/2020     Barriers to Learning/Limitations: None  Compensate with: N/A  Home Situation:   Home Situation  Home Environment: Private residence  # Steps to Enter: 2  One/Two Story Residence: Two story  Lift Chair Available: Yes(son is going to install)  Living Alone: No  Support Systems: Child(isaura), Family member(s)  Patient Expects to be Discharged to[de-identified] Private residence  Current DME Used/Available at Home: Si Ripa, rollator, Walker, rolling, Tub transfer bench, Commode, bedside, Cane, straight  Tub or Shower Type: Tub/Shower combination(does not use)  Critical Behavior:  Neurologic State: Alert  Orientation Level: Oriented X4  Cognition: Follows commands     Psychosocial  Purposeful Interaction: Yes  Caring Interventions: Reassure  Strength:    Strength: Generally decreased, functional       Tone & Sensation:   Tone: Normal  Sensation: Intact    Functional Mobility:  Bed Mobility:    Sit to Supine: Supervision  Scooting: Modified independent  Transfers:  Sit to Stand: Modified independent  Stand to Sit: Modified independent    Balance:   Sitting: Intact; Without support  Standing: Intact; With support(RW)  Standing - Static: Good  Standing - Dynamic : Good    Ambulation/Gait Training:  Distance (ft): 40 Feet (ft)  Assistive Device: Walker, rolling  Ambulation - Level of Assistance: Modified independent  Speed/Marylou: Slow  Interventions: Verbal cues    Pain:  Pain level pre-treatment: 0/10   Pain level post-treatment: 0/10      Activity Tolerance:   Fair+  Please refer to the flowsheet for vital signs taken during this treatment. After treatment:   [x]         Patient left in no apparent distress sitting EOB  []         Patient left in no apparent distress in bed  [x]         Call bell left within reach  []         Nursing notified  []         Caregiver present  []         Bed alarm activated  []         SCDs applied    COMMUNICATION/EDUCATION:   [x]         Role of Physical Therapy in the acute care setting. [x]         Fall prevention education was provided and the patient/caregiver indicated understanding. []         Patient/family have participated as able in goal setting and plan of care. []         Patient/family agree to work toward stated goals and plan of care. []         Patient understands intent and goals of therapy, but is neutral about his/her participation. []         Patient is unable to participate in goal setting/plan of care: ongoing with therapy staff.  []         Other:     Thank you for this referral.  Yuly Lizarraga, PT   Time Calculation: 23 mins      Eval Complexity: History: MEDIUM  Complexity : 1-2 comorbidities / personal factors will impact the outcome/ POC Exam:LOW Complexity : 1-2 Standardized tests and measures addressing body structure, function, activity limitation and / or participation in recreation  Presentation: LOW Complexity : Stable, uncomplicated  Clinical Decision Making:Low Complexity    Overall Complexity:LOW

## 2020-05-17 NOTE — PROGRESS NOTES
conducted a Follow up consultation and Spiritual Assessment for [place] , who is a 66 y.o.,female. The  provided the following Interventions:  Continued the relationship of care and support with phone call. Listened empathically. Offered assurance of continued prayer on patients behalf. Chart reviewed. The following outcomes were achieved:  Patient expressed gratitude for 's visit. Assessment:  There are no further spiritual or Tenriism issues which require Spiritual Care Services interventions at this time. Plan:  Chaplains will continue to follow and will provide pastoral care on an as needed/requested basis.  recommends bedside caregivers page  on duty if patient shows signs of acute spiritual or emotional distress.        [id number]   [phone]

## 2020-05-18 ENCOUNTER — HOME HEALTH ADMISSION (OUTPATIENT)
Dept: HOME HEALTH SERVICES | Facility: HOME HEALTH | Age: 75
End: 2020-05-18
Payer: MEDICARE

## 2020-05-18 VITALS
HEART RATE: 84 BPM | BODY MASS INDEX: 46.08 KG/M2 | DIASTOLIC BLOOD PRESSURE: 74 MMHG | TEMPERATURE: 97.7 F | SYSTOLIC BLOOD PRESSURE: 135 MMHG | OXYGEN SATURATION: 98 % | RESPIRATION RATE: 17 BRPM | WEIGHT: 250.4 LBS | HEIGHT: 62 IN

## 2020-05-18 LAB
ANION GAP SERPL CALC-SCNC: 4 MMOL/L (ref 3–18)
BASOPHILS # BLD: 0 K/UL (ref 0–0.1)
BASOPHILS NFR BLD: 0 % (ref 0–2)
BUN SERPL-MCNC: 49 MG/DL (ref 7–18)
BUN/CREAT SERPL: 14 (ref 12–20)
CALCIUM SERPL-MCNC: 8.2 MG/DL (ref 8.5–10.1)
CHLORIDE SERPL-SCNC: 116 MMOL/L (ref 100–111)
CO2 SERPL-SCNC: 24 MMOL/L (ref 21–32)
CREAT SERPL-MCNC: 3.48 MG/DL (ref 0.6–1.3)
DIFFERENTIAL METHOD BLD: ABNORMAL
EOSINOPHIL # BLD: 0.3 K/UL (ref 0–0.4)
EOSINOPHIL NFR BLD: 6 % (ref 0–5)
ERYTHROCYTE [DISTWIDTH] IN BLOOD BY AUTOMATED COUNT: 15.9 % (ref 11.6–14.5)
GLUCOSE SERPL-MCNC: 85 MG/DL (ref 74–99)
HCT VFR BLD AUTO: 23.2 % (ref 35–45)
HGB BLD-MCNC: 7.4 G/DL (ref 12–16)
LYMPHOCYTES # BLD: 1.9 K/UL (ref 0.9–3.6)
LYMPHOCYTES NFR BLD: 43 % (ref 21–52)
MCH RBC QN AUTO: 31 PG (ref 24–34)
MCHC RBC AUTO-ENTMCNC: 31.9 G/DL (ref 31–37)
MCV RBC AUTO: 97.1 FL (ref 74–97)
MONOCYTES # BLD: 0.5 K/UL (ref 0.05–1.2)
MONOCYTES NFR BLD: 11 % (ref 3–10)
NEUTS SEG # BLD: 1.8 K/UL (ref 1.8–8)
NEUTS SEG NFR BLD: 40 % (ref 40–73)
PLATELET # BLD AUTO: 167 K/UL (ref 135–420)
PMV BLD AUTO: 9.9 FL (ref 9.2–11.8)
POTASSIUM SERPL-SCNC: 4.6 MMOL/L (ref 3.5–5.5)
RBC # BLD AUTO: 2.39 M/UL (ref 4.2–5.3)
SODIUM SERPL-SCNC: 144 MMOL/L (ref 136–145)
WBC # BLD AUTO: 4.5 K/UL (ref 4.6–13.2)

## 2020-05-18 PROCEDURE — 85025 COMPLETE CBC W/AUTO DIFF WBC: CPT

## 2020-05-18 PROCEDURE — 80048 BASIC METABOLIC PNL TOTAL CA: CPT

## 2020-05-18 PROCEDURE — 36415 COLL VENOUS BLD VENIPUNCTURE: CPT

## 2020-05-18 PROCEDURE — 74011250637 HC RX REV CODE- 250/637: Performed by: INTERNAL MEDICINE

## 2020-05-18 PROCEDURE — 74011250637 HC RX REV CODE- 250/637: Performed by: STUDENT IN AN ORGANIZED HEALTH CARE EDUCATION/TRAINING PROGRAM

## 2020-05-18 RX ORDER — CEPHALEXIN 250 MG/1
250 CAPSULE ORAL EVERY 8 HOURS
Qty: 30 CAP | Refills: 0 | Status: SHIPPED | OUTPATIENT
Start: 2020-05-18 | End: 2020-05-28

## 2020-05-18 RX ORDER — CEPHALEXIN 250 MG/1
250 CAPSULE ORAL EVERY 8 HOURS
Status: DISCONTINUED | OUTPATIENT
Start: 2020-05-18 | End: 2020-05-18 | Stop reason: HOSPADM

## 2020-05-18 RX ADMIN — ALLOPURINOL 50 MG: 100 TABLET ORAL at 09:00

## 2020-05-18 RX ADMIN — Medication 1 CAPSULE: at 09:00

## 2020-05-18 RX ADMIN — CHOLECALCIFEROL TAB 25 MCG (1000 UNIT) 2 TABLET: 25 TAB at 09:00

## 2020-05-18 RX ADMIN — CEPHALEXIN 250 MG: 250 CAPSULE ORAL at 08:58

## 2020-05-18 RX ADMIN — PANTOPRAZOLE 40 MG: 40 TABLET, DELAYED RELEASE ORAL at 09:00

## 2020-05-18 RX ADMIN — NEPHROCAP 1 CAPSULE: 1 CAP ORAL at 09:00

## 2020-05-18 RX ADMIN — SODIUM BICARBONATE 650 MG TABLET 1300 MG: at 09:00

## 2020-05-18 RX ADMIN — LEVOTHYROXINE SODIUM 125 MCG: 125 TABLET ORAL at 07:30

## 2020-05-18 RX ADMIN — Medication 500 MG: at 09:00

## 2020-05-18 RX ADMIN — ACETAMINOPHEN 650 MG: 325 TABLET ORAL at 09:00

## 2020-05-18 RX ADMIN — TAMSULOSIN HYDROCHLORIDE 0.4 MG: 0.4 CAPSULE ORAL at 09:00

## 2020-05-18 NOTE — PROGRESS NOTES
Problem: Pressure Injury - Risk of  Goal: *Prevention of pressure injury  Description: Document Giovany Scale and appropriate interventions in the flowsheet. Outcome: Progressing Towards Goal  Note: Pressure Injury Interventions:       Moisture Interventions: Maintain skin hydration (lotion/cream), Limit adult briefs, Absorbent underpads    Activity Interventions: Pressure redistribution bed/mattress(bed type)    Mobility Interventions: Pressure redistribution bed/mattress (bed type), HOB 30 degrees or less    Nutrition Interventions: Document food/fluid/supplement intake                     Problem: Patient Education: Go to Patient Education Activity  Goal: Patient/Family Education  Outcome: Progressing Towards Goal     Problem: Falls - Risk of  Goal: *Absence of Falls  Description: Document Freddy Fall Risk and appropriate interventions in the flowsheet.   Outcome: Progressing Towards Goal  Note: Fall Risk Interventions:  Mobility Interventions: Assess mobility with egress test, Communicate number of staff needed for ambulation/transfer         Medication Interventions: Evaluate medications/consider consulting pharmacy, Teach patient to arise slowly    Elimination Interventions: Call light in reach    History of Falls Interventions: Evaluate medications/consider consulting pharmacy         Problem: Patient Education: Go to Patient Education Activity  Goal: Patient/Family Education  Outcome: Progressing Towards Goal     Problem: Urinary Tract Infection - Adult  Goal: *Absence of infection signs and symptoms  Outcome: Progressing Towards Goal     Problem: Patient Education: Go to Patient Education Activity  Goal: Patient/Family Education  Outcome: Progressing Towards Goal     Problem: Anemia Care Plan (Adult and Pediatric)  Goal: *Labs within defined limits  Outcome: Progressing Towards Goal  Goal: *Tolerates increased activity  Outcome: Progressing Towards Goal     Problem: Patient Education: Go to Patient Education Activity  Goal: Patient/Family Education  Outcome: Progressing Towards Goal     Problem: Patient Education: Go to Patient Education Activity  Goal: Patient/Family Education  Outcome: Progressing Towards Goal

## 2020-05-18 NOTE — ROUTINE PROCESS
Received report from Priscilla. Patient resting in bed in no distress. Denies pain and needs at this time. Call light and personal items placed in reach.     7:29 AM - Bedside shift change report given to MONTANA Blum (oncoming nurse) by Dee Dee Real RN (offgoing nurse). Report given with SBAR, Kardex, Intake/Output, MAR and Recent Results.

## 2020-05-18 NOTE — DISCHARGE INSTRUCTIONS
Patient Education        Anemia: Care Instructions  Your Care Instructions    Anemia is a low level of red blood cells, which carry oxygen throughout your body. Many things can cause anemia. Lack of iron is one of the most common causes. Your body needs iron to make hemoglobin, a substance in red blood cells that carries oxygen from the lungs to your body's cells. Without enough iron, the body produces fewer and smaller red blood cells. As a result, your body's cells do not get enough oxygen, and you feel tired and weak. And you may have trouble concentrating. Bleeding is the most common cause of a lack of iron. You may have heavy menstrual bleeding or bleeding caused by conditions such as ulcers, hemorrhoids, or cancer. Regular use of aspirin or other anti-inflammatory medicines (such as ibuprofen) also can cause bleeding in some people. A lack of iron in your diet also can cause anemia, especially at times when the body needs more iron, such as during pregnancy, infancy, and the teen years. Your doctor may have prescribed iron pills. It may take several months of treatment for your iron levels to return to normal. Your doctor also may suggest that you eat foods that are rich in iron, such as meat and beans. There are many other causes of anemia. It is not always due to a lack of iron. Finding the specific cause of your anemia will help your doctor find the right treatment for you. Follow-up care is a key part of your treatment and safety. Be sure to make and go to all appointments, and call your doctor if you are having problems. It's also a good idea to know your test results and keep a list of the medicines you take. How can you care for yourself at home? · Take your medicines exactly as prescribed. Call your doctor if you think you are having a problem with your medicine.   · If your doctor recommends iron pills, take them as directed:  ? Try to take the pills on an empty stomach about 1 hour before or 2 hours after meals. But you may need to take iron with food to avoid an upset stomach. ? Do not take antacids or drink milk or caffeine drinks (such as coffee, tea, or cola) at the same time or within 2 hours of the time that you take your iron. They can make it hard for your body to absorb the iron. ? Vitamin C (from food or supplements) helps your body absorb iron. Try taking iron pills with a glass of orange juice or some other food that is high in vitamin C, such as citrus fruits. ? Iron pills may cause stomach problems, such as heartburn, nausea, diarrhea, constipation, and cramps. Be sure to drink plenty of fluids, and include fruits, vegetables, and fiber in your diet each day. Iron pills often make your bowel movements dark or green. ? If you forget to take an iron pill, do not take a double dose of iron the next time you take a pill. ? Keep iron pills out of the reach of small children. An overdose of iron can be very dangerous. · Follow your doctor's advice about eating iron-rich foods. These include red meat, shellfish, poultry, eggs, beans, raisins, whole-grain bread, and leafy green vegetables. · Steam vegetables to help them keep their iron content. When should you call for help? Call 911 anytime you think you may need emergency care. For example, call if:    · You have symptoms of a heart attack. These may include:  ? Chest pain or pressure, or a strange feeling in the chest.  ? Sweating. ? Shortness of breath. ? Nausea or vomiting. ? Pain, pressure, or a strange feeling in the back, neck, jaw, or upper belly or in one or both shoulders or arms. ? Lightheadedness or sudden weakness. ? A fast or irregular heartbeat. After you call  911, the  may tell you to chew 1 adult-strength or 2 to 4 low-dose aspirin. Wait for an ambulance.  Do not try to drive yourself.     · You passed out (lost consciousness).    Call your doctor now or seek immediate medical care if:    · You have new or increased shortness of breath.     · You are dizzy or lightheaded, or you feel like you may faint.     · Your fatigue and weakness continue or get worse.     · You have any abnormal bleeding, such as:  ? Nosebleeds. ? Vaginal bleeding that is different (heavier, more frequent, at a different time of the month) than what you are used to.  ? Bloody or black stools, or rectal bleeding. ? Bloody or pink urine.    Watch closely for changes in your health, and be sure to contact your doctor if:    · You do not get better as expected. Where can you learn more? Go to http://jasmin-todd.info/  Enter R301 in the search box to learn more about \"Anemia: Care Instructions. \"  Current as of: November 7, 2019Content Version: 12.4  © 7828-4688 [employer]. Care instructions adapted under license by [employer] (which disclaims liability or warranty for this information). If you have questions about a medical condition or this instruction, always ask your healthcare professional. [place] [address] any warranty or liability for your use of this information. Advance Care Planning  PPatient Education      Cephalexin (Bio-Cef, Keflex) - (By mouth)   Why this medicine is used:   Treats infections. Contact a nurse or doctor right away if you have:  · Blistering, peeling, or red skin rash  · Severe or bloody diarrhea     Common side effects:  · Mild diarrhea or nausea  © 2017 [address] is for End User's use only and may not be sold, redistributed or otherwise used for commercial purposes. eople with COVID-19 may have no symptoms, mild symptoms, such as fever, cough, and shortness of breath or they may have more severe illness, developing severe and fatal pneumonia.   As a result, Advance Care Planning with attention to naming a health care decision maker (someone you trust to make healthcare decisions for you if you could not speak for yourself) and sharing other health care preferences is important BEFORE a possible health crisis. Please contact your Primary Care Provider to discuss Advance Care Planning. Preventing the Spread of Coronavirus Disease 2019 in Homes and Residential Communities  For the most recent information go to [employer]    Prevention steps for People with confirmed or suspected COVID-19 (including persons under investigation) who do not need to be hospitalized  and   People with confirmed COVID-19 who were hospitalized and determined to be medically stable to go home    Your healthcare provider and public health staff will evaluate whether you can be cared for at home. If it is determined that you do not need to be hospitalized and can be isolated at home, you will be monitored by staff from your local or state health department. You should follow the prevention steps below until a healthcare provider or local or state health department says you can return to your normal activities. Stay home except to get medical care  People who are mildly ill with COVID-19 are able to isolate at home during their illness. You should restrict activities outside your home, except for getting medical care. Do not go to work, school, or public areas. Avoid using public transportation, ride-sharing, or taxis. Separate yourself from other people and animals in your home  People: As much as possible, you should stay in a specific room and away from other people in your home. Also, you should use a separate bathroom, if available. Animals: You should restrict contact with pets and other animals while you are sick with COVID-19, just like you would around other people.  Although there have not been reports of pets or other animals becoming sick with COVID-19, it is still recommended that people sick with COVID-19 limit contact with animals until more information is known about the virus. When possible, have another member of your household care for your animals while you are sick. If you are sick with COVID-19, avoid contact with your pet, including petting, snuggling, being kissed or licked, and sharing food. If you must care for your pet or be around animals while you are sick, wash your hands before and after you interact with pets and wear a facemask. Call ahead before visiting your doctor  If you have a medical appointment, call the healthcare provider and tell them that you have or may have COVID-19. This will help the healthcare providers office take steps to keep other people from getting infected or exposed. Wear a facemask  You should wear a facemask when you are around other people (e.g., sharing a room or vehicle) or pets and before you enter a healthcare providers office. If you are not able to wear a facemask (for example, because it causes trouble breathing), then people who live with you should not stay in the same room with you, or they should wear a facemask if they enter your room. Cover your coughs and sneezes  Cover your mouth and nose with a tissue when you cough or sneeze. Throw used tissues in a lined trash can. Immediately wash your hands with soap and water for at least 20 seconds or, if soap and water are not available, clean your hands with an alcohol-based hand  that contains at least 60% alcohol. Clean your hands often  Wash your hands often with soap and water for at least 20 seconds, especially after blowing your nose, coughing, or sneezing; going to the bathroom; and before eating or preparing food. If soap and water are not readily available, use an alcohol-based hand  with at least 60% alcohol, covering all surfaces of your hands and rubbing them together until they feel dry. Soap and water are the best option if hands are visibly dirty. Avoid touching your eyes, nose, and mouth with unwashed hands.   Avoid sharing personal household items  You should not share dishes, drinking glasses, cups, eating utensils, towels, or bedding with other people or pets in your home. After using these items, they should be washed thoroughly with soap and water. Clean all high-touch surfaces everyday  High touch surfaces include counters, tabletops, doorknobs, bathroom fixtures, toilets, phones, keyboards, tablets, and bedside tables. Also, clean any surfaces that may have blood, stool, or body fluids on them. Use a household cleaning spray or wipe, according to the label instructions. Labels contain instructions for safe and effective use of the cleaning product including precautions you should take when applying the product, such as wearing gloves and making sure you have good ventilation during use of the product. Monitor your symptoms  Seek prompt medical attention if your illness is worsening (e.g., difficulty breathing). Before seeking care, call your healthcare provider and tell them that you have, or are being evaluated for, COVID-19. Put on a facemask before you enter the facility. These steps will help the healthcare providers office to keep other people in the office or waiting room from getting infected or exposed. Ask your healthcare provider to call the local or state health department. Persons who are placed under active monitoring or facilitated self-monitoring should follow instructions provided by their local health department or occupational health professionals, as appropriate. When working with your local health department check their available hours. If you have a medical emergency and need to call 911, notify the dispatch personnel that you have, or are being evaluated for COVID-19. If possible, put on a facemask before emergency medical services arrive. Discontinuing home isolation  Patients with confirmed COVID-19 should remain under home isolation precautions until the risk of secondary transmission to others is thought to be low. The decision to discontinue home isolation precautions should be made on a case-by-case basis, in consultation with healthcare providers and state and local health departments.

## 2020-05-18 NOTE — PROGRESS NOTES
[address]  RESPONSE TO [facility] INQUIRY      Patient: Justina Mcgarry MRN: [id number]  CSN: [id number]    YOB: 1945  Age: 66 y.o. Sex: female         INQUIRY     Pt admitted with UTI. Pt noted to have right nephrostomy tube replaced 2/20/2020.  If possible, please document in the progress notes and d/c summary if you are evaluating and / or treating any of the following:     UTI due to right nephrostomy tube  UTI not due to right nephrostomy tube  Other  Clinically unable to determine     The medical record reflects the following:     Risk Factors: CKD 5, anemia, recurrent UTI's; R nephrostomy tube        Clinical Indicators:   -? pyelonephritis,cultures neg.adjust antibiotics for renal failure per 5/17 Nephrology  -Clinical presentation consistent with right kidney pyelonephritis, complicated urinary tract infection per 5/18 Inf Dis  -Urine culture 5/15-less than 10,000 colonies of mixed skin mike.          Treatment: Cephalexin, Cefepime, NS Infusion      Thank you,  [facility], [address] [place] [address]          Electronically signed by Eris Martinez RN at 05/18/20 1037       RESPONSE   UTI not due to right nephrostomy tube    Jesús Robert MD , PGY-1   [facility] Medicine   Senior Pager: [phone]   May 18, 2020, 3:03 PM

## 2020-05-18 NOTE — PROGRESS NOTES
Spoke with pt by phone. Family to transport home. [id number] already received referral in [facility]. Spoke with Bhumika with [address]. Discharge order noted for today. Pt has been accepted to [facility] agency. Spoke with patient and is agreeable to the transition plan today. Transport has been arranged through family. Patient's discharge summary and home health  orders have been forwarded to [facility] home health  agency via care connect. Updated bedside RN,   to the transition plan.   Discharge information has been documented on the AVS.          ABHISHEK Moyer, [facility]- [phone]

## 2020-05-18 NOTE — HOME CARE
[facility]; [facility] noted for today , [facility] will follow for SN,PT,OT,HHA; , patient lives with son Katja Centeno), he states pt has a RW and [employer] and he will be getting patient's [facility] when he goes to [place] to bring some of her belongings to his home ; Travel screening completed; notified [facility] central intake patient has COVID test pending,also recent temps added to referral ; [facility] referral processed to [facility] central Intake. MILLER BECKER.

## 2020-05-18 NOTE — PROGRESS NOTES
Infectious Disease progress Note      Reason: Right kidney pyelonephritis, complicated UTI, [id number] screening    Current abx Prior abx   Cefepime since 5/15/2020      Lines:       Assessment :    66 y. o. 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      E.coli bloodstream infection in 3/2019 (pan susceptible e.coli)     Acinetobacter UTI in 4/2019 -  (intermediately susceptible to ceftriaxone, ceftazidime, pip/tazo)     Hospitalization 2/2020 for  early hemorrhagic cystitis - Right PCNL. No hydronephrosis noted on Ct scan 2/20/20. +nt edema of bladder c/w cystitis. Urine culture 2/20/20-greater than 100,000 colonies of mixed gram-positive mike including 20,000 staph aureus- MSSA.      S/P PCNL exchange on 2/21/20. Urology help appreciated. urine culture 4/13/20 positive for >100,000 colonies of strep. Bovis    Status post right ureteral stent placement about 2 weeks ago     Clinical presentation consistent with right kidney pyelonephritis, complicated urinary tract infection. CT scan revealed interval improvement of right-sided hydroureter. Persistent moderate right pelviectasis and mild to moderate proximal through mid right hydroureter with periureteral stranding and right perinephric stranding. Punctate focus of intraluminal gas noted within the urinary bladder. Urine culture 5/15-less than 10,000 colonies of mixed skin mike. Discussed with microbiology lab. Low clinical probability of COVID-19 infection. COVID-19 test pending    Patient has documented h/o ciprofloxacin allergy. She has tolerated ciprofloxacin on multiple occasions in the past. About a year ago, she had hives when she was given ciprofloxacin.  Patient wishes to try ciprofloxacin again if needed. Clinically better. Wants to go home.     Recommendations:     1.   Recommend discontinuation of cefepime and starting oral cephalexin to 250 mg orally every 8 hours till 5/28/20.  2    probiotics while on antibiotics   3. Follow-up urology recommendations   4. Follow-up  COVID-19 test results. 5.  Okay to discharge patient from infectious disease standpoint. Patient should remain in quarantine until COVID-19 test results available     Advance Care planning:DNR: discussed  with patient/surrogate decision maker - Dilshad mabry Round- [phone]     Above plan was discussed in details with patient, and  primary team. Please call me if any further questions or concerns. Will continue to participate in the care of this patient. HPI:    Feels better. Resolved right flank pain. She denies any sore throat runny nose, generalized muscle aches, cough, shortness of breath. Patient denies headaches, visual disturbances, sore throat, runny nose, earaches, cp, sob, chills, cough, abdominal pain, diarrhea, pain or weakness in extremities. He denies back pain/flank pain. home Medication List    Details   gabapentin (NEURONTIN) 100 mg capsule Take 100 mg by mouth nightly. fluticasone propionate (FLONASE) 50 mcg/actuation nasal spray 2 sprays in each nostril daily  Qty: 1 Bottle, Refills: 1      lactobacillus sp. 50 billion cpu (BIO-K PLUS) 50 billion cell -375 mg cap capsule Take 1 Cap by mouth daily. Qty: 30 Cap, Refills: 2      tamsulosin (FLOMAX) 0.4 mg capsule Take 1 Cap by mouth daily. Qty: 90 Cap, Refills: 3      sodium bicarbonate 650 mg tablet Take 2 Tabs by mouth three (3) times daily. Indications: excess body acid  Qty: 30 Tab, Refills: 1      acetaminophen (TYLENOL) 325 mg tablet Take 650 mg by mouth two (2) times a day. allopurinoL (ZYLOPRIM) 100 mg tablet Take 200 mg by mouth daily. amLODIPine (NORVASC) 5 mg tablet Take 5 mg by mouth daily.       ascorbic acid, vitamin C, (VITAMIN C) 500 mg tablet Take 500 mg by mouth.      calcitRIOL (ROCALTROL) 0.25 mcg capsule Take 0.25 mcg by mouth every other day. carvediloL (COREG) 12.5 mg tablet Take  by mouth two (2) times daily (with meals). cholecalciferol (VITAMIN D3) (2,000 UNITS /50 MCG) cap capsule Take 2,000 Units by mouth two (2) times a day. Take two tabs a total of 4000 units      cyclobenzaprine (FLEXERIL) 5 mg tablet Take 5 mg by mouth daily. latanoprost (XALATAN) 0.005 % ophthalmic solution Administer 1 Drop to both eyes nightly. One drop at bedtime      levothyroxine (SYNTHROID) 125 mcg tablet Take 125 mcg by mouth Daily (before breakfast). omeprazole (PRILOSEC) 20 mg capsule Take 20 mg by mouth daily. ondansetron (ZOFRAN ODT) 4 mg disintegrating tablet Take 4 mg by mouth every eight (8) hours as needed for Nausea or Vomiting. vit B Cmplx 3-FA-Vit C-Biotin (NEPHRO HOWIE RX) 1- mg-mg-mcg tablet Take 1 Tab by mouth daily.              Current Facility-Administered Medications   Medication Dose Route Frequency    cephALEXin (KEFLEX) capsule 250 mg  250 mg Oral Q8H    acetaminophen (TYLENOL) tablet 650 mg  650 mg Oral Q6H PRN    sodium chloride (NS) flush 5-10 mL  5-10 mL IntraVENous PRN    acetaminophen (TYLENOL) tablet 650 mg  650 mg Oral BID    [Held by provider] amLODIPine (NORVASC) tablet 5 mg  5 mg Oral DAILY    ascorbic acid (vitamin C) (VITAMIN C) tablet 500 mg  500 mg Oral DAILY    calcitRIOL (ROCALTROL) capsule 0.25 mcg  0.25 mcg Oral EVERY OTHER DAY    [Held by provider] carvediloL (COREG) tablet 12.5 mg  12.5 mg Oral BID WITH MEALS    cholecalciferol (VITAMIN D3) (1000 Units /25 mcg) tablet 2 Tab  2,000 Units Oral BID    lactobacillus sp. 50 billion cpu (BIO-K PLUS) capsule 1 Cap  1 Cap Oral DAILY    latanoprost (XALATAN) 0.005 % ophthalmic solution 1 Drop  1 Drop Both Eyes QHS    levothyroxine (SYNTHROID) tablet 125 mcg  125 mcg Oral ACB    pantoprazole (PROTONIX) tablet 40 mg  40 mg Oral DAILY    ondansetron (ZOFRAN ODT) tablet 4 mg  4 mg Oral Q8H PRN    sodium bicarbonate tablet 1,300 mg  1,300 mg Oral TID    tamsulosin (FLOMAX) capsule 0.4 mg  0.4 mg Oral DAILY    B complex-vitaminC-folic acid (NEPHROCAP) cap  1 Cap Oral DAILY    enoxaparin (LOVENOX) injection 30 mg  30 mg SubCUTAneous Q24H    epoetin caitlin-epbx (RETACRIT) injection 10,000 Units  10,000 Units SubCUTAneous Q MON, WED & FRI    gabapentin (NEURONTIN) capsule 100 mg  100 mg Oral QHS    allopurinoL (ZYLOPRIM) tablet 50 mg  50 mg Oral DAILY       Allergies: Ciprofloxacin and Statins-hmg-coa reductase inhibitors    Temp (24hrs), Av.8 °F (36.6 °C), Min:97.4 °F (36.3 °C), Max:98.2 °F (36.8 °C)    Visit Vitals  /62 (BP 1 Location: Right arm, BP Patient Position: At rest)   Pulse 92   Temp 98 °F (36.7 °C)   Resp 18   Ht 5' 2\" (1.575 m)   Wt 113.6 kg (250 lb 6.4 oz)   SpO2 99%   Breastfeeding No   BMI 45.80 kg/m²       ROS: 12 point ROS obtained in details. Pertinent positives as mentioned in HPI,   otherwise negative    Physical Exam:    General: Well developed, well nourished female laying on the bed  AAOx3 in no acute distress.     General:   awake alert and oriented   HEENT:  Normocephalic, atraumatic, PERRL, EOMI, no scleral icterus or pallor; no conjunctival hemmohage;  nasal and oral mucous are moist and without evidence of lesions. No thrush. Neck supple, no bruits. Lymph Nodes:   no cervical, axillary or inguinal adenopathy   Lungs:   non-labored, bilaterally clear to auscultation- no crackles wheezes rales or rhonchi   Heart:  RRR, s1 and s2; no rubs or gallops, no edema, + pedal pulses   Abdomen:  soft, non-distended, active bowel sounds, no hepatomegaly, no splenomegaly. Resolved suprapubic tenderness   Genitourinary:  no chua   Extremities:   no clubbing, cyanosis; no joint effusions or swelling;  Full ROM of all large joints to the upper and lower extremities; muscle mass appropriate for age Neurologic:  No gross focal sensory abnormalities; 5/5 muscle strength to upper and lower extremities. Speech appropriate.  Cranial nerves intact                        Skin:  No rash or ulcers noted   Back:  no spinal or paraspinal muscle tenderness or rigidity, resolved right CVA tenderness around the right PCNL tube      Psychiatric:  No suicidal or homicidal ideations, appropriate mood and affect             Labs: Results:   Chemistry Recent Labs     05/18/20  0355 05/17/20 0315 05/16/20  0314   GLU 85 84 84    141 144   K 4.6 4.1 4.8   * 112* 114*   CO2 24 23 23   BUN 49* 53* 55*   CREA 3.48* 3.59* 3.72*   CA 8.2* 8.5 8.6   AGAP 4 6 7   BUCR 14 15 15      CBC w/Diff Recent Labs     05/18/20 0355 05/17/20 0315 05/16/20  0314   WBC 4.5* 6.5 7.5   RBC 2.39* 2.65* 2.64*   HGB 7.4* 8.3* 8.1*   HCT 23.2* 25.6* 25.6*    205 164   GRANS 40 45 64   LYMPH 43 39 22   EOS 6* 4 2      Microbiology Recent Labs     05/15/20  1128   CULT No significant growth, <10,000 CFU/mL CORRECTED ON 05/16 AT 2020: PREVIOUSLY REPORTED AS NO SIGNIFICANT GROWTH          RADIOLOGY:    All available imaging studies/reports in [facility] for this admission were reviewed    Dr. Riley Faustin, Infectious Disease Specialist  [phone]  May 18, 2020  4:28 PM

## 2020-05-18 NOTE — PROGRESS NOTES
[facility]  Intern Progress Note    Patient: Valentina Gaffney MRN: [id number]   SSN: [id number]  YOB: 1945   Age: 66 y.o. Sex: female      Admit Date: 5/15/2020    LOS: 3 days   Chief Complaint   Patient presents with    Fever    Vomiting       Subjective:     Pt feels great today and is ready to go home. Review of Systems   Constitutional: Negative for fever. Cardiovascular: Negative for chest pain. Gastrointestinal: Negative for abdominal pain. Genitourinary: Negative for dysuria and hematuria. Objective:     PE:  - General: patient is in no acute distress  - Cv: RRR, no murmurs/gallops/rubs, peripheral pulses intact  - Resp: Lungs CTA   - Abdominal: Soft, non-tender, non-distended, Bs+   - MSK: no swelling in extermities     Vitals:   Patient Vitals for the past 24 hrs:   Temp Pulse Resp BP SpO2   05/18/20 0837 98 °F (36.7 °C) 92 18 107/62 99 %   05/18/20 0452 98.2 °F (36.8 °C) 78 20 137/71 95 %   05/18/20 0052 97.4 °F (36.3 °C) 80 18 106/56 97 %   05/17/20 2107 97.6 °F (36.4 °C) 81 20 116/61 99 %         Intake/Output Summary (Last 24 hours) at 5/18/2020 1153  Last data filed at 5/18/2020 1052  Gross per 24 hour   Intake 560 ml   Output 150 ml   Net 410 ml       Labs reviewed. Pertinent labs: WBC 4.5, Hbg 7.4, Cr 3.48    Assessment/Plan:     66 y. o. female with PMH CKD Stage 5, hx of recurrent MDR UTIs/stones s/p R nephrostomy tube (06/20), HTN, DM II w/ neuropathy, anemia, galucoma, GERD, now admitted with complicated UTI     Complicated UTI/Hx of recurrent UTI/nephrolithiasis w/MDR s/p R Nephrostomy tube placement 2019  - Continue Keflex PO 250mg q8h- will continue this on discharge until 5/28 per ID  - Urology signed off  - I&Os  - F/U UCx and blood cxs- NGTD  - Continue Probiotics (patient has hx of c diff with ABX)  - Continue home Allopurinol for hx uric acid stones- decreased dose to 50mg per nephro recs  - Continue home Flomax  - VS per unit routine  - Daily daily CBC/BMP  - PT/OT/CM  -D/C home today     CKD Stage 5   Cr 3.83 upon admission; improving slowly baseline ~3.2. Follows with Dr. Mukesh Dash. Patient states she has had discussions with him about starting dialysis in the next month or so. - nephrology, appreciate recs   - Trend Cr with daily BMP  - Renally dose meds, avoid nephrotoxic drugs     Anemia of Chronic Disease likely 2/2 to her CKD Stage 5. Last iron study 5/8/2020: Iron 72, TIBC 263, Iron saturation 27, Ferritin 70. HgB 7.8 on admission, no signs of active bleeding. Patient on Procrit and Venofer outpatient. Stable. - Monitor daily CBC   - monitor for signs of bleeding   - Continue with Epo outpatient as previously managed by nephro.     Chronic Metabolic Acidosis likely 2/2 to her CKD Stage 5  - Continue on home NaHCO2 650mg two tablets TID     Chronic HTN with soft Bps on admission: SBPs in 90's-100's since arrival to ED. Seemed to improve somewhat with fluid recussitation  - Hold home Amlodipine and Coreg for now, will restart as blood pressure tolerates  - Monitor BP closely     Hx of DM II w/ neuropathy  Last HbA1C <3.5% 2/21/2020. Takes Gabapentin 100mg QHS for neuropathy  - Continue to monitor BG thorough daily BMPs   - Continue home Gabapentin      GERD - chronic hx  On Omeprazole at home  - Protonix 40mg qday while admitted     Hypothyroidism - chronic   - Continue home Synthroid 125 mcg po qhs     Glaucoma  - continue home Latanoprost     Pastoral Care appreciated.     Diet Renal Diet    DVT Prophylaxis Lovenox- renal dosing   GI Prophylaxis Protonix   Code status DNR/DNI   Disposition Telemetry      Point of Contact Gokul Stern   Relationship:  [phone]         Signed By: CHICA Ahuja MD  05/18/20

## 2020-05-18 NOTE — CDMP QUERY
Pt admitted with UTI. Pt noted to have right nephrostomy tube replaced 2/20/2020. If possible, please document in the progress notes and d/c summary if you are evaluating and / or treating any of the following: UTI due to right nephrostomy tube UTI not due to right nephrostomy tube Other Clinically unable to determine The medical record reflects the following: 
   Risk Factors: CKD 5, anemia, recurrent UTI's; R nephrostomy tube Clinical Indicators:  
-? pyelonephritis,cultures neg.adjust antibiotics for renal failure per 5/17 Nephrology 
-Clinical presentation consistent with right kidney pyelonephritis, complicated urinary tract infection per 5/18 Inf Dis 
-Urine culture 5/15-less than 10,000 colonies of mixed skin mike. Treatment: Cephalexin, Cefepime, NS Infusion Thank you, Serenity Gleason, [facility], [address] [place] [address]

## 2020-05-18 NOTE — PROGRESS NOTES
[address]  RESPONSE TO [facility] INQUIRY      Patient: Rafat Aguiar MRN: [id number]  CSN: [id number]    YOB: 1945  Age: 66 y.o. Sex: female         INQUIRY     Patient admitted with BMI 45.8. If possible, please document in progress notes and discharge summary if you are evaluating and /or treating any of the following:     · Morbid Obesity   · Obesity   · Overweight  · Other, please specify  · BMI not clinically significant      The medical record reflects the following:    Risk Factors: DM, CKD, Hx of Gastric Bypass    Clinical Indicators: BMI 45.8    Treatment:  Renal Diet     Thank you,  Briseyda Dixon RN, [facility]      Electronically signed by Renay Peñaloza RN at 05/18/20 1041       RESPONSE   Yes, agree with morbid obesity.      Sofya Pressley MD , PGY-1   [address]   Senior Pager: [phone]   May 18, 2020, 3:04 PM

## 2020-05-18 NOTE — CDMP QUERY
Patient admitted with BMI 45.8. If possible, please document in progress notes and discharge summary if you are evaluating and /or treating any of the following: ? Morbid Obesity ? Obesity ? Overweight 
? Other, please specify ? BMI not clinically significant The medical record reflects the following: 
  Risk Factors: DM, CKD, Hx of Gastric Bypass Clinical Indicators: BMI 45.8 Treatment:  Renal Diet Thank you, Emir Grace, [facility] [address]

## 2020-05-19 ENCOUNTER — PATIENT OUTREACH (OUTPATIENT)
Dept: CASE MANAGEMENT | Age: 75
End: 2020-05-19

## 2020-05-19 LAB — SARS-COV-2, COV2NT: NOT DETECTED

## 2020-05-19 NOTE — PROGRESS NOTES
Covid Care Transitions     Patient contacted regarding recent discharge and COVID-19 exposure ( rule  Out). Care Transition Nurse/ Ambulatory Care Manager contacted the patient by telephone to perform post discharge assessment. Verified name and  with patient as identifiers. Patient has following risk factors of: diabetes. CTN/ACM reviewed discharge instructions, medical action plan and red flags related to discharge diagnosis. Reviewed and educated them on any new and changed medications related to discharge diagnosis. Education provided regarding infection prevention, and signs and symptoms of COVID-19 and when to seek medical attention with patient who verbalized understanding. Discussed exposure protocols and quarantine from [facility] What to do if you are sick with Coronavirus disease 2019 Tsosie Flatten) and given an opportunity for questions and concerns. The patient agrees to contact the COVID-19 hotline [phone] or PCP office for questions related to their healthcare. CTN/ACM provided contact information for future reference. Reviewed self-quarantine instructions with patient. Explained to patient self-quarantine is isolating self to one room and using one bathroom, if possible and to avoid contact with family and others for a period of 14 days to avoid spreading illness. Patient/family/caregiver given information for [employer] and agrees to enroll no       PPlan for follow-up call in 5-7 days based on severity of symptoms and risk factors.

## 2020-05-20 ENCOUNTER — HOME CARE VISIT (OUTPATIENT)
Dept: SCHEDULING | Facility: HOME HEALTH | Age: 75
End: 2020-05-20
Payer: MEDICARE

## 2020-05-20 ENCOUNTER — HOME CARE VISIT (OUTPATIENT)
Dept: HOME HEALTH SERVICES | Facility: HOME HEALTH | Age: 75
End: 2020-05-20
Payer: MEDICARE

## 2020-05-20 VITALS
RESPIRATION RATE: 15 BRPM | DIASTOLIC BLOOD PRESSURE: 82 MMHG | OXYGEN SATURATION: 99 % | HEART RATE: 86 BPM | SYSTOLIC BLOOD PRESSURE: 148 MMHG | TEMPERATURE: 98.2 F

## 2020-05-20 PROCEDURE — G0151 HHCP-SERV OF PT,EA 15 MIN: HCPCS

## 2020-05-20 PROCEDURE — 400013 HH SOC

## 2020-05-20 PROCEDURE — G0299 HHS/HOSPICE OF RN EA 15 MIN: HCPCS

## 2020-05-21 ENCOUNTER — HOME CARE VISIT (OUTPATIENT)
Dept: HOME HEALTH SERVICES | Facility: HOME HEALTH | Age: 75
End: 2020-05-21
Payer: MEDICARE

## 2020-05-21 VITALS
HEART RATE: 80 BPM | OXYGEN SATURATION: 98 % | TEMPERATURE: 98.2 F | DIASTOLIC BLOOD PRESSURE: 80 MMHG | RESPIRATION RATE: 16 BRPM | SYSTOLIC BLOOD PRESSURE: 122 MMHG

## 2020-05-21 LAB
BACTERIA SPEC CULT: NORMAL
BACTERIA SPEC CULT: NORMAL
SERVICE CMNT-IMP: NORMAL
SERVICE CMNT-IMP: NORMAL

## 2020-05-21 PROCEDURE — A4452 WATERPROOF TAPE: HCPCS

## 2020-05-21 PROCEDURE — A6216 NON-STERILE GAUZE<=16 SQ IN: HCPCS

## 2020-05-21 PROCEDURE — A6212 FOAM DRG <=16 SQ IN W/BORDER: HCPCS

## 2020-05-21 PROCEDURE — A6260 WOUND CLEANSER ANY TYPE/SIZE: HCPCS

## 2020-05-22 ENCOUNTER — HOSPITAL ENCOUNTER (OUTPATIENT)
Dept: INFUSION THERAPY | Age: 75
Discharge: HOME | End: 2020-05-22
Payer: MEDICARE

## 2020-05-22 VITALS
RESPIRATION RATE: 18 BRPM | TEMPERATURE: 98.5 F | SYSTOLIC BLOOD PRESSURE: 137 MMHG | DIASTOLIC BLOOD PRESSURE: 62 MMHG | HEART RATE: 92 BPM | OXYGEN SATURATION: 96 %

## 2020-05-22 DIAGNOSIS — N18.9 ANEMIA OF CHRONIC RENAL FAILURE, UNSPECIFIED CKD STAGE: Primary | ICD-10-CM

## 2020-05-22 DIAGNOSIS — D63.1 ANEMIA OF CHRONIC RENAL FAILURE, UNSPECIFIED CKD STAGE: Primary | ICD-10-CM

## 2020-05-22 DIAGNOSIS — D63.1 ANEMIA OF CHRONIC RENAL FAILURE, UNSPECIFIED CKD STAGE: ICD-10-CM

## 2020-05-22 DIAGNOSIS — N18.9 ANEMIA OF CHRONIC RENAL FAILURE, UNSPECIFIED CKD STAGE: ICD-10-CM

## 2020-05-22 LAB
BASO+EOS+MONOS # BLD AUTO: 0.6 K/UL (ref 0–2.3)
BASO+EOS+MONOS NFR BLD AUTO: 8 % (ref 0.1–17)
DIFFERENTIAL METHOD BLD: ABNORMAL
ERYTHROCYTE [DISTWIDTH] IN BLOOD BY AUTOMATED COUNT: 17.3 % (ref 11.5–14.5)
HCT VFR BLD AUTO: 26.1 % (ref 36–48)
HGB BLD-MCNC: 8.5 G/DL (ref 12–16)
LYMPHOCYTES # BLD: 2.5 K/UL (ref 1.1–5.9)
LYMPHOCYTES NFR BLD: 33 % (ref 14–44)
MCH RBC QN AUTO: 32.7 PG (ref 25–35)
MCHC RBC AUTO-ENTMCNC: 32.6 G/DL (ref 31–37)
MCV RBC AUTO: 100.4 FL (ref 78–102)
NEUTS SEG # BLD: 4.5 K/UL (ref 1.8–9.5)
NEUTS SEG NFR BLD: 59 % (ref 40–70)
PLATELET # BLD AUTO: 312 K/UL (ref 140–440)
RBC # BLD AUTO: 2.6 M/UL (ref 4.1–5.1)
WBC # BLD AUTO: 7.6 K/UL (ref 4.5–13)

## 2020-05-22 PROCEDURE — 74011250636 HC RX REV CODE- 250/636: Performed by: INTERNAL MEDICINE

## 2020-05-22 PROCEDURE — 96372 THER/PROPH/DIAG INJ SC/IM: CPT

## 2020-05-22 PROCEDURE — 85025 COMPLETE CBC W/AUTO DIFF WBC: CPT

## 2020-05-22 PROCEDURE — 36415 COLL VENOUS BLD VENIPUNCTURE: CPT

## 2020-05-22 RX ADMIN — EPOETIN ALFA-EPBX 8000 UNITS: 4000 INJECTION, SOLUTION INTRAVENOUS; SUBCUTANEOUS at 14:28

## 2020-05-22 NOTE — PROGRESS NOTES
[facility] Progress Note    Date: May 22, 2020    Name: Caro Lo    MRN: [id number]         : 1945     Retacrit injection every 2 weeks    Arrived ambulatory to [facility] at 1400. No complaints or concerns voiced      Patient has dialysis fistula in left arm. Ms. Josette Jackson vitals were reviewed and patient was observed for 5 minutes prior to treatment. Visit Vitals  /62 (BP 1 Location: Right arm, BP Patient Position: Sitting)   Pulse 92   Temp 98.5 °F (36.9 °C)   Resp 18   SpO2 96%     Blood sample obtained from right hand x 1 attempt for cbc, gauze and coban applied to site. Pt tolerated well. Lab results were obtained and reviewed. Recent Results (from the past 12 hour(s))   CBC WITH 3 PART DIFF    Collection Time: 20  2:08 PM   Result Value Ref Range    WBC 7.6 4.5 - 13.0 K/uL    RBC 2.60 (L) 4.10 - 5.10 M/uL    HGB 8.5 (L) 12.0 - 16 g/dL    HCT 26.1 (L) 36 - 48 %    .4 78 - 102 FL    MCH 32.7 25.0 - 35.0 PG    MCHC 32.6 31 - 37 g/dL    RDW 17.3 (H) 11.5 - 14.5 %    PLATELET 041 473 - 422 K/uL    NEUTROPHILS 59 40 - 70 %    MIXED CELLS 8 0.1 - 17 %    LYMPHOCYTES 33 14 - 44 %    ABS. NEUTROPHILS 4.5 1.8 - 9.5 K/UL    ABS. MIXED CELLS 0.6 0.0 - 2.3 K/uL    ABS. LYMPHOCYTES 2.5 1.1 - 5.9 K/UL    DF AUTOMATED         Retacrit 8,000 units administered to back of right upper arm. Band aid applied to site. Pt tolerated well. Ms. Shreya Lowe tolerated well, and had no complaints. Patient armband removed and shredded. Ms. Shreya Lowe was discharged from [place] [address] in stable condition at 1430. She is to return on 20 at 1400 for her next appointment for CBC and Retacrit injection.       Justin Gilford, RN  May 22, 2020

## 2020-05-23 ENCOUNTER — HOME CARE VISIT (OUTPATIENT)
Dept: SCHEDULING | Facility: HOME HEALTH | Age: 75
End: 2020-05-23
Payer: MEDICARE

## 2020-05-23 PROCEDURE — G0157 HHC PT ASSISTANT EA 15: HCPCS

## 2020-05-24 VITALS
HEART RATE: 69 BPM | OXYGEN SATURATION: 99 % | TEMPERATURE: 98.6 F | SYSTOLIC BLOOD PRESSURE: 136 MMHG | RESPIRATION RATE: 14 BRPM | DIASTOLIC BLOOD PRESSURE: 76 MMHG

## 2020-05-25 ENCOUNTER — HOME CARE VISIT (OUTPATIENT)
Dept: SCHEDULING | Facility: HOME HEALTH | Age: 75
End: 2020-05-25
Payer: MEDICARE

## 2020-05-25 VITALS
SYSTOLIC BLOOD PRESSURE: 128 MMHG | HEART RATE: 83 BPM | OXYGEN SATURATION: 99 % | DIASTOLIC BLOOD PRESSURE: 82 MMHG | TEMPERATURE: 98.2 F | RESPIRATION RATE: 13 BRPM

## 2020-05-25 PROCEDURE — G0157 HHC PT ASSISTANT EA 15: HCPCS

## 2020-05-26 ENCOUNTER — HOME CARE VISIT (OUTPATIENT)
Dept: SCHEDULING | Facility: HOME HEALTH | Age: 75
End: 2020-05-26
Payer: MEDICARE

## 2020-05-26 ENCOUNTER — HOME CARE VISIT (OUTPATIENT)
Dept: HOME HEALTH SERVICES | Facility: HOME HEALTH | Age: 75
End: 2020-05-26
Payer: MEDICARE

## 2020-05-26 VITALS
RESPIRATION RATE: 16 BRPM | SYSTOLIC BLOOD PRESSURE: 136 MMHG | OXYGEN SATURATION: 98 % | HEART RATE: 80 BPM | TEMPERATURE: 98.2 F | DIASTOLIC BLOOD PRESSURE: 80 MMHG

## 2020-05-26 PROCEDURE — G0299 HHS/HOSPICE OF RN EA 15 MIN: HCPCS

## 2020-05-27 ENCOUNTER — HOME CARE VISIT (OUTPATIENT)
Dept: SCHEDULING | Facility: HOME HEALTH | Age: 75
End: 2020-05-27
Payer: MEDICARE

## 2020-05-27 VITALS
SYSTOLIC BLOOD PRESSURE: 138 MMHG | DIASTOLIC BLOOD PRESSURE: 76 MMHG | OXYGEN SATURATION: 99 % | TEMPERATURE: 97.7 F | HEART RATE: 95 BPM

## 2020-05-27 PROCEDURE — G0157 HHC PT ASSISTANT EA 15: HCPCS

## 2020-05-29 ENCOUNTER — HOME CARE VISIT (OUTPATIENT)
Dept: HOME HEALTH SERVICES | Facility: HOME HEALTH | Age: 75
End: 2020-05-29
Payer: MEDICARE

## 2020-06-02 ENCOUNTER — HOME CARE VISIT (OUTPATIENT)
Dept: SCHEDULING | Facility: HOME HEALTH | Age: 75
End: 2020-06-02
Payer: MEDICARE

## 2020-06-02 VITALS
TEMPERATURE: 98.3 F | SYSTOLIC BLOOD PRESSURE: 125 MMHG | DIASTOLIC BLOOD PRESSURE: 74 MMHG | HEART RATE: 101 BPM | OXYGEN SATURATION: 99 %

## 2020-06-02 PROCEDURE — A6216 NON-STERILE GAUZE<=16 SQ IN: HCPCS

## 2020-06-02 PROCEDURE — G0299 HHS/HOSPICE OF RN EA 15 MIN: HCPCS

## 2020-06-02 PROCEDURE — G0157 HHC PT ASSISTANT EA 15: HCPCS

## 2020-06-02 PROCEDURE — A4452 WATERPROOF TAPE: HCPCS

## 2020-06-02 PROCEDURE — A6212 FOAM DRG <=16 SQ IN W/BORDER: HCPCS

## 2020-06-04 ENCOUNTER — HOME CARE VISIT (OUTPATIENT)
Dept: HOME HEALTH SERVICES | Facility: HOME HEALTH | Age: 75
End: 2020-06-04
Payer: MEDICARE

## 2020-06-04 ENCOUNTER — HOME CARE VISIT (OUTPATIENT)
Dept: SCHEDULING | Facility: HOME HEALTH | Age: 75
End: 2020-06-04
Payer: MEDICARE

## 2020-06-04 VITALS — DIASTOLIC BLOOD PRESSURE: 70 MMHG | SYSTOLIC BLOOD PRESSURE: 122 MMHG

## 2020-06-04 PROCEDURE — G0151 HHCP-SERV OF PT,EA 15 MIN: HCPCS

## 2020-06-05 ENCOUNTER — HOSPITAL ENCOUNTER (OUTPATIENT)
Dept: INFUSION THERAPY | Age: 75
Discharge: HOME | End: 2020-06-05
Payer: MEDICARE

## 2020-06-05 VITALS
OXYGEN SATURATION: 98 % | SYSTOLIC BLOOD PRESSURE: 142 MMHG | TEMPERATURE: 98.3 F | DIASTOLIC BLOOD PRESSURE: 72 MMHG | RESPIRATION RATE: 18 BRPM | HEART RATE: 98 BPM

## 2020-06-05 VITALS
SYSTOLIC BLOOD PRESSURE: 140 MMHG | OXYGEN SATURATION: 98 % | DIASTOLIC BLOOD PRESSURE: 90 MMHG | TEMPERATURE: 98.1 F | HEART RATE: 112 BPM | RESPIRATION RATE: 18 BRPM

## 2020-06-05 DIAGNOSIS — N18.9 ANEMIA OF CHRONIC RENAL FAILURE, UNSPECIFIED CKD STAGE: Primary | ICD-10-CM

## 2020-06-05 DIAGNOSIS — D63.1 ANEMIA OF CHRONIC RENAL FAILURE, UNSPECIFIED CKD STAGE: ICD-10-CM

## 2020-06-05 DIAGNOSIS — D63.1 ANEMIA OF CHRONIC RENAL FAILURE, UNSPECIFIED CKD STAGE: Primary | ICD-10-CM

## 2020-06-05 DIAGNOSIS — N18.9 ANEMIA OF CHRONIC RENAL FAILURE, UNSPECIFIED CKD STAGE: ICD-10-CM

## 2020-06-05 LAB
BASO+EOS+MONOS # BLD AUTO: 0.7 K/UL (ref 0–2.3)
BASO+EOS+MONOS NFR BLD AUTO: 11 % (ref 0.1–17)
DIFFERENTIAL METHOD BLD: ABNORMAL
ERYTHROCYTE [DISTWIDTH] IN BLOOD BY AUTOMATED COUNT: 16.5 % (ref 11.5–14.5)
HCT VFR BLD AUTO: 28 % (ref 36–48)
HGB BLD-MCNC: 8.7 G/DL (ref 12–16)
LYMPHOCYTES # BLD: 2 K/UL (ref 1.1–5.9)
LYMPHOCYTES NFR BLD: 32 % (ref 14–44)
MCH RBC QN AUTO: 31.3 PG (ref 25–35)
MCHC RBC AUTO-ENTMCNC: 31.1 G/DL (ref 31–37)
MCV RBC AUTO: 100.7 FL (ref 78–102)
NEUTS SEG # BLD: 3.7 K/UL (ref 1.8–9.5)
NEUTS SEG NFR BLD: 58 % (ref 40–70)
PLATELET # BLD AUTO: 189 K/UL (ref 140–440)
RBC # BLD AUTO: 2.78 M/UL (ref 4.1–5.1)
WBC # BLD AUTO: 6.4 K/UL (ref 4.5–13)

## 2020-06-05 PROCEDURE — 85025 COMPLETE CBC W/AUTO DIFF WBC: CPT

## 2020-06-05 PROCEDURE — 74011250636 HC RX REV CODE- 250/636: Performed by: INTERNAL MEDICINE

## 2020-06-05 PROCEDURE — 36415 COLL VENOUS BLD VENIPUNCTURE: CPT

## 2020-06-05 PROCEDURE — 96372 THER/PROPH/DIAG INJ SC/IM: CPT

## 2020-06-05 RX ADMIN — EPOETIN ALFA-EPBX 8000 UNITS: 4000 INJECTION, SOLUTION INTRAVENOUS; SUBCUTANEOUS at 14:44

## 2020-06-09 ENCOUNTER — HOME CARE VISIT (OUTPATIENT)
Dept: SCHEDULING | Facility: HOME HEALTH | Age: 75
End: 2020-06-09
Payer: MEDICARE

## 2020-06-09 VITALS
RESPIRATION RATE: 13 BRPM | HEART RATE: 91 BPM | SYSTOLIC BLOOD PRESSURE: 138 MMHG | OXYGEN SATURATION: 98 % | DIASTOLIC BLOOD PRESSURE: 84 MMHG | TEMPERATURE: 98.7 F

## 2020-06-09 PROCEDURE — G0299 HHS/HOSPICE OF RN EA 15 MIN: HCPCS

## 2020-06-09 PROCEDURE — G0157 HHC PT ASSISTANT EA 15: HCPCS

## 2020-06-10 VITALS
TEMPERATURE: 97.4 F | DIASTOLIC BLOOD PRESSURE: 80 MMHG | OXYGEN SATURATION: 99 % | SYSTOLIC BLOOD PRESSURE: 132 MMHG | RESPIRATION RATE: 18 BRPM | HEART RATE: 80 BPM

## 2020-06-11 ENCOUNTER — HOME CARE VISIT (OUTPATIENT)
Dept: SCHEDULING | Facility: HOME HEALTH | Age: 75
End: 2020-06-11
Payer: MEDICARE

## 2020-06-11 VITALS
HEART RATE: 108 BPM | DIASTOLIC BLOOD PRESSURE: 72 MMHG | RESPIRATION RATE: 13 BRPM | OXYGEN SATURATION: 98 % | TEMPERATURE: 98.4 F | SYSTOLIC BLOOD PRESSURE: 138 MMHG

## 2020-06-11 PROCEDURE — G0157 HHC PT ASSISTANT EA 15: HCPCS

## 2020-06-16 ENCOUNTER — HOME CARE VISIT (OUTPATIENT)
Dept: HOME HEALTH SERVICES | Facility: HOME HEALTH | Age: 75
End: 2020-06-16
Payer: MEDICARE

## 2020-06-16 ENCOUNTER — HOME CARE VISIT (OUTPATIENT)
Dept: SCHEDULING | Facility: HOME HEALTH | Age: 75
End: 2020-06-16
Payer: MEDICARE

## 2020-06-16 VITALS
RESPIRATION RATE: 13 BRPM | DIASTOLIC BLOOD PRESSURE: 76 MMHG | SYSTOLIC BLOOD PRESSURE: 122 MMHG | OXYGEN SATURATION: 97 % | HEART RATE: 105 BPM | TEMPERATURE: 98.4 F

## 2020-06-16 PROCEDURE — G0157 HHC PT ASSISTANT EA 15: HCPCS

## 2020-06-17 ENCOUNTER — HOME CARE VISIT (OUTPATIENT)
Dept: SCHEDULING | Facility: HOME HEALTH | Age: 75
End: 2020-06-17
Payer: MEDICARE

## 2020-06-17 PROCEDURE — G0300 HHS/HOSPICE OF LPN EA 15 MIN: HCPCS

## 2020-06-18 ENCOUNTER — HOSPITAL ENCOUNTER (EMERGENCY)
Age: 75
Discharge: HOME | End: 2020-06-18
Attending: EMERGENCY MEDICINE
Payer: MEDICARE

## 2020-06-18 ENCOUNTER — HOME CARE VISIT (OUTPATIENT)
Dept: SCHEDULING | Facility: HOME HEALTH | Age: 75
End: 2020-06-18
Payer: MEDICARE

## 2020-06-18 VITALS
SYSTOLIC BLOOD PRESSURE: 98 MMHG | TEMPERATURE: 98.1 F | OXYGEN SATURATION: 100 % | HEIGHT: 62 IN | WEIGHT: 240 LBS | DIASTOLIC BLOOD PRESSURE: 46 MMHG | BODY MASS INDEX: 44.16 KG/M2 | HEART RATE: 98 BPM | RESPIRATION RATE: 18 BRPM

## 2020-06-18 VITALS
HEART RATE: 80 BPM | DIASTOLIC BLOOD PRESSURE: 68 MMHG | SYSTOLIC BLOOD PRESSURE: 112 MMHG | TEMPERATURE: 98.7 F | OXYGEN SATURATION: 99 %

## 2020-06-18 DIAGNOSIS — N39.0 URINARY TRACT INFECTION WITHOUT HEMATURIA, SITE UNSPECIFIED: ICD-10-CM

## 2020-06-18 DIAGNOSIS — N30.01 ACUTE CYSTITIS WITH HEMATURIA: ICD-10-CM

## 2020-06-18 DIAGNOSIS — N18.5 CKD (CHRONIC KIDNEY DISEASE) STAGE 5, GFR LESS THAN 15 ML/MIN (HCC): Primary | ICD-10-CM

## 2020-06-18 LAB
ANION GAP SERPL CALC-SCNC: 7 MMOL/L (ref 3–18)
APPEARANCE UR: ABNORMAL
BACTERIA URNS QL MICRO: ABNORMAL /HPF
BASOPHILS # BLD: 0 K/UL (ref 0–0.1)
BASOPHILS NFR BLD: 0 % (ref 0–2)
BILIRUB UR QL: NEGATIVE
BUN SERPL-MCNC: 45 MG/DL (ref 7–18)
BUN/CREAT SERPL: 11 (ref 12–20)
CALCIUM SERPL-MCNC: 8.8 MG/DL (ref 8.5–10.1)
CHLORIDE SERPL-SCNC: 113 MMOL/L (ref 100–111)
CO2 SERPL-SCNC: 23 MMOL/L (ref 21–32)
COLOR UR: YELLOW
CREAT SERPL-MCNC: 4.04 MG/DL (ref 0.6–1.3)
DIFFERENTIAL METHOD BLD: ABNORMAL
EOSINOPHIL # BLD: 0.1 K/UL (ref 0–0.4)
EOSINOPHIL NFR BLD: 2 % (ref 0–5)
EPITH CASTS URNS QL MICRO: ABNORMAL /LPF (ref 0–5)
ERYTHROCYTE [DISTWIDTH] IN BLOOD BY AUTOMATED COUNT: 15.5 % (ref 11.6–14.5)
GLUCOSE SERPL-MCNC: 94 MG/DL (ref 74–99)
GLUCOSE UR STRIP.AUTO-MCNC: NEGATIVE MG/DL
HCT VFR BLD AUTO: 29.1 % (ref 35–45)
HGB BLD-MCNC: 9.2 G/DL (ref 12–16)
HGB UR QL STRIP: ABNORMAL
KETONES UR QL STRIP.AUTO: NEGATIVE MG/DL
LEUKOCYTE ESTERASE UR QL STRIP.AUTO: ABNORMAL
LYMPHOCYTES # BLD: 1.7 K/UL (ref 0.9–3.6)
LYMPHOCYTES NFR BLD: 22 % (ref 21–52)
MCH RBC QN AUTO: 31.1 PG (ref 24–34)
MCHC RBC AUTO-ENTMCNC: 31.6 G/DL (ref 31–37)
MCV RBC AUTO: 98.3 FL (ref 74–97)
MONOCYTES # BLD: 0.8 K/UL (ref 0.05–1.2)
MONOCYTES NFR BLD: 10 % (ref 3–10)
MUCOUS THREADS URNS QL MICRO: ABNORMAL /LPF
NEUTS SEG # BLD: 5.3 K/UL (ref 1.8–8)
NEUTS SEG NFR BLD: 66 % (ref 40–73)
NITRITE UR QL STRIP.AUTO: NEGATIVE
PH UR STRIP: 7 [PH] (ref 5–8)
PLATELET # BLD AUTO: 244 K/UL (ref 135–420)
PMV BLD AUTO: 9.8 FL (ref 9.2–11.8)
POTASSIUM SERPL-SCNC: 5.7 MMOL/L (ref 3.5–5.5)
PROT UR STRIP-MCNC: 100 MG/DL
RBC # BLD AUTO: 2.96 M/UL (ref 4.2–5.3)
RBC #/AREA URNS HPF: ABNORMAL /HPF (ref 0–5)
SODIUM SERPL-SCNC: 143 MMOL/L (ref 136–145)
SP GR UR REFRACTOMETRY: 1.01 (ref 1–1.03)
UROBILINOGEN UR QL STRIP.AUTO: 0.2 EU/DL (ref 0.2–1)
WBC # BLD AUTO: 8 K/UL (ref 4.6–13.2)
WBC URNS QL MICRO: ABNORMAL /HPF (ref 0–5)

## 2020-06-18 PROCEDURE — 87086 URINE CULTURE/COLONY COUNT: CPT

## 2020-06-18 PROCEDURE — 80048 BASIC METABOLIC PNL TOTAL CA: CPT

## 2020-06-18 PROCEDURE — 74011000250 HC RX REV CODE- 250: Performed by: STUDENT IN AN ORGANIZED HEALTH CARE EDUCATION/TRAINING PROGRAM

## 2020-06-18 PROCEDURE — 96374 THER/PROPH/DIAG INJ IV PUSH: CPT

## 2020-06-18 PROCEDURE — G0157 HHC PT ASSISTANT EA 15: HCPCS

## 2020-06-18 PROCEDURE — 74011250636 HC RX REV CODE- 250/636: Performed by: EMERGENCY MEDICINE

## 2020-06-18 PROCEDURE — 99285 EMERGENCY DEPT VISIT HI MDM: CPT

## 2020-06-18 PROCEDURE — 87077 CULTURE AEROBIC IDENTIFY: CPT

## 2020-06-18 PROCEDURE — A6212 FOAM DRG <=16 SQ IN W/BORDER: HCPCS

## 2020-06-18 PROCEDURE — 87186 SC STD MICRODIL/AGAR DIL: CPT

## 2020-06-18 PROCEDURE — 81001 URINALYSIS AUTO W/SCOPE: CPT

## 2020-06-18 PROCEDURE — 96375 TX/PRO/DX INJ NEW DRUG ADDON: CPT

## 2020-06-18 PROCEDURE — 74011250636 HC RX REV CODE- 250/636: Performed by: STUDENT IN AN ORGANIZED HEALTH CARE EDUCATION/TRAINING PROGRAM

## 2020-06-18 PROCEDURE — 85025 COMPLETE CBC W/AUTO DIFF WBC: CPT

## 2020-06-18 RX ORDER — CEFDINIR 300 MG/1
300 CAPSULE ORAL 2 TIMES DAILY
Qty: 14 CAP | Refills: 0 | Status: SHIPPED | OUTPATIENT
Start: 2020-06-18 | End: 2020-06-25

## 2020-06-18 RX ORDER — CEFDINIR 300 MG/1
300 CAPSULE ORAL 2 TIMES DAILY
Qty: 10 CAP | Refills: 0 | Status: SHIPPED | OUTPATIENT
Start: 2020-06-18 | End: 2020-06-18 | Stop reason: CLARIF

## 2020-06-18 RX ORDER — ONDANSETRON 2 MG/ML
4 INJECTION INTRAMUSCULAR; INTRAVENOUS
Status: COMPLETED | OUTPATIENT
Start: 2020-06-18 | End: 2020-06-18

## 2020-06-18 RX ADMIN — CEFTRIAXONE SODIUM 1 G: 1 INJECTION, POWDER, FOR SOLUTION INTRAMUSCULAR; INTRAVENOUS at 17:36

## 2020-06-18 RX ADMIN — ONDANSETRON 4 MG: 2 INJECTION INTRAMUSCULAR; INTRAVENOUS at 14:13

## 2020-06-18 RX ADMIN — SODIUM CHLORIDE 500 ML: 9 INJECTION, SOLUTION INTRAVENOUS at 14:17

## 2020-06-18 NOTE — ED TRIAGE NOTES
Patient to triage with urinary incontinece. Symptoms started Sunday. Pt denies N/V/D and fever/chills.

## 2020-06-18 NOTE — ED PROVIDER NOTES
EMERGENCY DEPARTMENT HISTORY AND PHYSICAL EXAM    11:54 AM      Date: 6/18/2020  Patient Name: Tommy Lopez    History of Presenting Illness     Chief Complaint   Patient presents with    Urinary Incontinence         History Provided By: Patient  Location/Duration/Severity/Modifying factors   HPI  73yof c/o pain with urination, foul odor in urine, change in consistency, back/side pain, and nausea since Sunday. Pt states frequent hx of UTIs, states symptoms similar, but delayed coming to ER 2/2 concern for pre-existing ureter stent and not wanting it to be removed. Pt denies fevers, chills, emesis, no provocation or alleviation of symptoms. Pt also c/o increased fatigue. PCP: Angelika Lacy MD    Current Facility-Administered Medications   Medication Dose Route Frequency Provider Last Rate Last Dose    ondansetron (ZOFRAN) injection 4 mg  4 mg IntraVENous NOW Shayna Whaley MD         Current Outpatient Medications   Medication Sig Dispense Refill    amLODIPine (Norvasc) 5 mg tablet Take 5 mg by mouth daily.  biotin 1,000 mcg chew Take 1 Tab by mouth daily.  cyanocobalamin 1,000 mcg tablet Take 1,000 mcg by mouth daily.  gabapentin (NEURONTIN) 100 mg capsule Take 100 mg by mouth nightly.  fluticasone propionate (FLONASE) 50 mcg/actuation nasal spray 2 sprays in each nostril daily 1 Bottle 1    lactobacillus sp. 50 billion cpu (BIO-K PLUS) 50 billion cell -375 mg cap capsule Take 1 Cap by mouth daily. 30 Cap 2    tamsulosin (FLOMAX) 0.4 mg capsule Take 1 Cap by mouth daily. 90 Cap 3    sodium bicarbonate 650 mg tablet Take 2 Tabs by mouth three (3) times daily. Indications: excess body acid 30 Tab 1    acetaminophen (TYLENOL) 325 mg tablet Take 650 mg by mouth two (2) times a day.  allopurinoL (ZYLOPRIM) 100 mg tablet Take 200 mg by mouth daily.  ascorbic acid, vitamin C, (VITAMIN C) 500 mg tablet Take 500 mg by mouth daily.       calcitRIOL (ROCALTROL) 0.25 mcg capsule Take 0.25 mcg by mouth daily.  cholecalciferol (VITAMIN D3) (2,000 UNITS /50 MCG) cap capsule Take 2,000 Units by mouth two (2) times a day. Take two tabs a total of 4000 units      latanoprost (XALATAN) 0.005 % ophthalmic solution Administer 1 Drop to both eyes nightly. One drop at bedtime      levothyroxine (SYNTHROID) 125 mcg tablet Take 125 mcg by mouth Daily (before breakfast).  omeprazole (PRILOSEC) 20 mg capsule Take 20 mg by mouth daily.  ondansetron (ZOFRAN ODT) 4 mg disintegrating tablet Take 4 mg by mouth every eight (8) hours as needed for Nausea or Vomiting.  vit B Cmplx 3-FA-Vit C-Biotin (NEPHRO HOWIE RX) 1- mg-mg-mcg tablet Take 1 Tab by mouth daily. Past History     Past Medical History:  Past Medical History:   Diagnosis Date    Acidosis     Anemia     Arteriovenous fistula (HCC)     CKD (chronic kidney disease)     Diabetes (HCC)     HLD (hyperlipidemia)     HTN (hypertension)     Hyperparathyroidism due to renal insufficiency (HCC)     Hypothyroid     Kidney stone     Lung mass     Recurrent UTI     Ureter, stricture     Uric acid nephrolithiasis     Urinary incontinence        Past Surgical History:  Past Surgical History:   Procedure Laterality Date    HX APPENDECTOMY      HX CHOLECYSTECTOMY      HX GASTRIC BYPASS      HX KNEE ARTHROSCOPY      HX UROLOGICAL      right PCN placement    HX UROLOGICAL  07/23/2018    RIGHT URETEROSCOPY WITH HOLMIUM LASER    IR EXCHANGE NEPHRO PERC LT SI  2/21/2020    IR EXCHANGE NEPHRO PERC RT SI  4/13/2020    IR NEPHROURETERAL PERC RT PLC CATH NEW ACCESS  SI  4/30/2020       Family History:  History reviewed. No pertinent family history. Social History:  Social History     Tobacco Use    Smoking status: Never Smoker    Smokeless tobacco: Never Used   Substance Use Topics    Alcohol use: Never     Frequency: Never    Drug use: Never       Allergies:   Allergies   Allergen Reactions    Ciprofloxacin Hives  Statins-Hmg-Coa Reductase Inhibitors Other (comments)     Body ache         Review of Systems       Review of Systems   Constitutional: Positive for fatigue. Negative for activity change, appetite change and chills. HENT: Negative for congestion, dental problem, ear discharge, facial swelling and mouth sores. Respiratory: Negative for apnea, cough, chest tightness and shortness of breath. Cardiovascular: Negative for chest pain, palpitations and leg swelling. Gastrointestinal: Positive for nausea. Negative for abdominal distention, abdominal pain, constipation and vomiting. Endocrine: Negative for cold intolerance, heat intolerance and polydipsia. Genitourinary: Positive for dysuria, flank pain and frequency. Negative for difficulty urinating, genital sores, hematuria and pelvic pain. Musculoskeletal: Negative for arthralgias, back pain and joint swelling. Skin: Negative for color change, pallor and rash. Allergic/Immunologic: Negative for environmental allergies. Neurological: Negative for dizziness, facial asymmetry, light-headedness, numbness and headaches. Hematological: Negative for adenopathy. Psychiatric/Behavioral: Negative for agitation, behavioral problems, confusion and decreased concentration. Physical Exam     Visit Vitals  /59 (BP 1 Location: Right arm, BP Patient Position: Sitting)   Pulse 83   Temp 98.1 °F (36.7 °C)   Resp 16   Ht 5' 2\" (1.575 m)   Wt 108.9 kg (240 lb)   SpO2 98%   BMI 43.90 kg/m²         Physical Exam  Constitutional:       General: She is not in acute distress. Appearance: She is obese. She is not ill-appearing or toxic-appearing. HENT:      Head: Normocephalic and atraumatic. Nose: Nose normal.      Mouth/Throat:      Mouth: Mucous membranes are moist.      Pharynx: Oropharynx is clear. Eyes:      Conjunctiva/sclera: Conjunctivae normal.      Pupils: Pupils are equal, round, and reactive to light.    Neck: Musculoskeletal: Normal range of motion and neck supple. Cardiovascular:      Rate and Rhythm: Normal rate and regular rhythm. Pulses: Normal pulses. Heart sounds: Normal heart sounds. Pulmonary:      Effort: Pulmonary effort is normal.      Breath sounds: Normal breath sounds. Abdominal:      General: Bowel sounds are normal. There is no distension. Palpations: Abdomen is soft. Tenderness: There is no right CVA tenderness, left CVA tenderness, guarding or rebound. Musculoskeletal: Normal range of motion. Arms:    Skin:     General: Skin is warm and dry. Neurological:      General: No focal deficit present. Mental Status: She is alert. Mental status is at baseline. Psychiatric:         Mood and Affect: Mood normal.         Thought Content: Thought content normal.           Diagnostic Study Results     Labs -  No results found for this or any previous visit (from the past 12 hour(s)). Radiologic Studies -   No orders to display         Medical Decision Making   I am the first provider for this patient. I reviewed the vital signs, available nursing notes, past medical history, past surgical history, family history and social history. Vital Signs-Reviewed the patient's vital signs. EKG: Not performed    Records Reviewed: Nursing Notes and Old Medical Records (Time of Review: 11:54 AM)    ED Course: Progress Notes, Reevaluation, and Consults:    ED Course as of Jun 18 1736   Thu Jun 18, 2020   1251 WBC: 8.0 [JL]   1712 Spoke with Dr. Lana Paulson of [facility] regarding any new recommendations for abx treatment. As per his request, pt given Rocephin IV and outpt course. [JL]      ED Course User Index  [JL] Rafia Chavez MD       Provider Notes (Medical Decision Making):   [facility]  20XHW seen for concern of urinary tract infection in the context of nephrostomy tube and ureteral stent. Concern for pyelo vs UTI.   No elevation in white cells makes systemic infection unlikely, in discussion with Urology regarding findings recommended outpt cefdinir therapy and routine follow up. Discussed with patient, discussed warning signs and return precautions. All questions were answered and pt expressed understanding. ATTENDING ATTESTATION:  Patient with UTI in context of nephrostomy tube and stent, not septic. I personally saw and examined the patient. I have reviewed and agree with the residents findings, including all diagnostic interpretations, and plans as written. I was present during the key portions of separately billed procedures. Shaw Hartman MD        Diagnosis     Clinical Impression: No diagnosis found. Disposition: Home    Follow-up Information    None          Patient's Medications   Start Taking    No medications on file   Continue Taking    ACETAMINOPHEN (TYLENOL) 325 MG TABLET    Take 650 mg by mouth two (2) times a day. ALLOPURINOL (ZYLOPRIM) 100 MG TABLET    Take 200 mg by mouth daily. AMLODIPINE (NORVASC) 5 MG TABLET    Take 5 mg by mouth daily. ASCORBIC ACID, VITAMIN C, (VITAMIN C) 500 MG TABLET    Take 500 mg by mouth daily. BIOTIN 1,000 MCG CHEW    Take 1 Tab by mouth daily. CALCITRIOL (ROCALTROL) 0.25 MCG CAPSULE    Take 0.25 mcg by mouth daily. CHOLECALCIFEROL (VITAMIN D3) (2,000 UNITS /50 MCG) CAP CAPSULE    Take 2,000 Units by mouth two (2) times a day. Take two tabs a total of 4000 units    CYANOCOBALAMIN 1,000 MCG TABLET    Take 1,000 mcg by mouth daily. FLUTICASONE PROPIONATE (FLONASE) 50 MCG/ACTUATION NASAL SPRAY    2 sprays in each nostril daily    GABAPENTIN (NEURONTIN) 100 MG CAPSULE    Take 100 mg by mouth nightly. LACTOBACILLUS SP. 50 BILLION CPU (BIO-K PLUS) 50 BILLION CELL -375 MG CAP CAPSULE    Take 1 Cap by mouth daily. LATANOPROST (XALATAN) 0.005 % OPHTHALMIC SOLUTION    Administer 1 Drop to both eyes nightly.  One drop at bedtime    LEVOTHYROXINE (SYNTHROID) 125 MCG TABLET    Take 125 mcg by mouth Daily (before breakfast). OMEPRAZOLE (PRILOSEC) 20 MG CAPSULE    Take 20 mg by mouth daily. ONDANSETRON (ZOFRAN ODT) 4 MG DISINTEGRATING TABLET    Take 4 mg by mouth every eight (8) hours as needed for Nausea or Vomiting. SODIUM BICARBONATE 650 MG TABLET    Take 2 Tabs by mouth three (3) times daily. Indications: excess body acid    TAMSULOSIN (FLOMAX) 0.4 MG CAPSULE    Take 1 Cap by mouth daily. VIT B CMPLX 3-FA-VIT C-BIOTIN (NEPHRO HOWIE RX) 1- MG-MG-MCG TABLET    Take 1 Tab by mouth daily.    These Medications have changed    No medications on file   Stop Taking    No medications on file          Go Reeves MD

## 2020-06-18 NOTE — DISCHARGE INSTRUCTIONS

## 2020-06-19 ENCOUNTER — HOSPITAL ENCOUNTER (OUTPATIENT)
Dept: INFUSION THERAPY | Age: 75
Discharge: HOME | End: 2020-06-19
Payer: MEDICARE

## 2020-06-19 ENCOUNTER — PATIENT OUTREACH (OUTPATIENT)
Dept: CASE MANAGEMENT | Age: 75
End: 2020-06-19

## 2020-06-19 VITALS
SYSTOLIC BLOOD PRESSURE: 121 MMHG | TEMPERATURE: 98.5 F | RESPIRATION RATE: 18 BRPM | HEART RATE: 86 BPM | OXYGEN SATURATION: 96 % | DIASTOLIC BLOOD PRESSURE: 75 MMHG

## 2020-06-19 DIAGNOSIS — N18.9 ANEMIA OF CHRONIC RENAL FAILURE, UNSPECIFIED CKD STAGE: ICD-10-CM

## 2020-06-19 DIAGNOSIS — D63.1 ANEMIA OF CHRONIC RENAL FAILURE, UNSPECIFIED CKD STAGE: Primary | ICD-10-CM

## 2020-06-19 DIAGNOSIS — D63.1 ANEMIA OF CHRONIC RENAL FAILURE, UNSPECIFIED CKD STAGE: ICD-10-CM

## 2020-06-19 DIAGNOSIS — N18.9 ANEMIA OF CHRONIC RENAL FAILURE, UNSPECIFIED CKD STAGE: Primary | ICD-10-CM

## 2020-06-19 PROCEDURE — 96372 THER/PROPH/DIAG INJ SC/IM: CPT

## 2020-06-19 PROCEDURE — 74011250636 HC RX REV CODE- 250/636: Performed by: INTERNAL MEDICINE

## 2020-06-19 RX ADMIN — EPOETIN ALFA-EPBX 8000 UNITS: 4000 INJECTION, SOLUTION INTRAVENOUS; SUBCUTANEOUS at 14:25

## 2020-06-19 NOTE — PROGRESS NOTES
[facility] Progress Note    Date: 2020    Name: Jessee Gonsalez    MRN: [id number]         : 1945      Ms. Stern was assessed and education was provided. Ms. Gabrielle Jacob vitals were reviewed and patient was observed for 5 minutes prior to treatment. Visit Vitals  /75 (BP 1 Location: Right arm, BP Patient Position: At rest;Sitting)   Pulse 86   Temp 98.5 °F (36.9 °C)   Resp 18   SpO2 96%       Lab results [facility] yesterday were obtained and reviewed. Retacrit 8,000 units SQ given for H&H 9.2/29. 1. Ms. Marcella Leslie tolerated the injection, and had no complaints. Patient armband removed and shredded. Ms. Marcella Leslie was discharged from [place] [address] in stable condition at 1430. She is to return on 2020 at 1400 for her next appointment for CBC/Retacrit.     Rashid Dolan RN  2020  2:42 PM

## 2020-06-19 NOTE — PROGRESS NOTES
Patient contacted regarding recent discharge and COVID-19 risk. Discussed COVID-19 related testing which was not done at this time. Test results were not done. Patient informed of results, if available? n/a    Care Transition Nurse/ Ambulatory Care Manager contacted the patient by telephone to perform post discharge assessment. Verified name and  with patient as identifiers. Patient has following risk factors of: diabetes and chronic kidney disease. CTN/ACM reviewed discharge instructions, medical action plan and red flags related to discharge diagnosis. Reviewed and educated them on any new and changed medications related to discharge diagnosis. Advised obtaining a 90-day supply of all daily and as-needed medications. Education provided regarding infection prevention, and signs and symptoms of COVID-19 and when to seek medical attention with patient who verbalized understanding. Discussed exposure protocols and quarantine from [address] you at higher risk for severe illness  and given an opportunity for questions and concerns. The patient agrees to contact the COVID-19 hotline [phone] or PCP office for questions related to their healthcare. CTN/ACM provided contact information for future reference. From CDC: Are you at higher risk for severe illness?  Wash your hands often.  Avoid close contact (6 feet, which is about two arm lengths) with people who are sick.  Put distance between yourself and other people if COVID-19 is spreading in your community.  Clean and disinfect frequently touched surfaces.  Avoid all cruise travel and non-essential air travel.  Call your healthcare professional if you have concerns about COVID-19 and your underlying condition or if you are sick.     For more information on steps you can take to protect yourself, see CDC's How to Protect Yourself      Patient/family/caregiver given information for [employer] and agrees to enroll no      Plan for follow-up call in 7-14 days based on severity of symptoms and risk factors. Patient reports she still feels a little weak and nauseous from antibiotics that were givven in the ED. States her son is on his way now to the pharmacy to have cefdinir script filled. She admits to having a follow up on Tues, with nephrology (Dr. Mp Pacheco) and is going to call today to schedule follow up with urology (Dr. eZe Oro). Patient agrees to call [facility] if she has any difficulty scheduling urology follow up.

## 2020-06-21 LAB
BACTERIA SPEC CULT: ABNORMAL
CC UR VC: ABNORMAL
SERVICE CMNT-IMP: ABNORMAL

## 2020-06-22 ENCOUNTER — HOME CARE VISIT (OUTPATIENT)
Dept: HOME HEALTH SERVICES | Facility: HOME HEALTH | Age: 75
End: 2020-06-22
Payer: MEDICARE

## 2020-06-22 VITALS
TEMPERATURE: 98.7 F | OXYGEN SATURATION: 95 % | SYSTOLIC BLOOD PRESSURE: 135 MMHG | HEART RATE: 80 BPM | DIASTOLIC BLOOD PRESSURE: 78 MMHG

## 2020-06-23 ENCOUNTER — HOSPITAL ENCOUNTER (OUTPATIENT)
Dept: LAB | Age: 75
Discharge: HOME | End: 2020-06-23

## 2020-06-23 ENCOUNTER — HOME CARE VISIT (OUTPATIENT)
Dept: SCHEDULING | Facility: HOME HEALTH | Age: 75
End: 2020-06-23
Payer: MEDICARE

## 2020-06-23 ENCOUNTER — HOSPITAL ENCOUNTER (OUTPATIENT)
Dept: GENERAL RADIOLOGY | Age: 75
Discharge: HOME | End: 2020-06-23
Payer: MEDICARE

## 2020-06-23 VITALS
DIASTOLIC BLOOD PRESSURE: 62 MMHG | TEMPERATURE: 97.6 F | SYSTOLIC BLOOD PRESSURE: 110 MMHG | OXYGEN SATURATION: 98 % | HEART RATE: 107 BPM

## 2020-06-23 DIAGNOSIS — N18.5 STAGE 5 CHRONIC KIDNEY DISEASE (HCC): ICD-10-CM

## 2020-06-23 LAB — XX-LABCORP SPECIMEN COL,LCBCF: NORMAL

## 2020-06-23 PROCEDURE — 400013 HH SOC

## 2020-06-23 PROCEDURE — G0151 HHCP-SERV OF PT,EA 15 MIN: HCPCS

## 2020-06-23 PROCEDURE — 71046 X-RAY EXAM CHEST 2 VIEWS: CPT

## 2020-06-23 PROCEDURE — 99001 SPECIMEN HANDLING PT-LAB: CPT

## 2020-06-24 ENCOUNTER — HOME CARE VISIT (OUTPATIENT)
Dept: HOME HEALTH SERVICES | Facility: HOME HEALTH | Age: 75
End: 2020-06-24
Payer: MEDICARE

## 2020-06-24 PROCEDURE — G0300 HHS/HOSPICE OF LPN EA 15 MIN: HCPCS

## 2020-06-25 NOTE — PROGRESS NOTES
Home Health PT discipline d/c completed, discharge summary available. SN services to continue and will complete agency d/c when appropriate.   Thank you for your referral.

## 2020-06-26 ENCOUNTER — PATIENT OUTREACH (OUTPATIENT)
Dept: CASE MANAGEMENT | Age: 75
End: 2020-06-26

## 2020-06-26 NOTE — PROGRESS NOTES
Patient contacted regarding COVID-19 risk and screening. Discussed COVID-19 related testing which was not done at this time. Test results were not done. Patient informed of results, if available? n/a     Care Transition Nurse/ Ambulatory Care Manager contacted the patient by telephone to perform follow-up assessment. Verified name and  with patient as identifiers. Patient has following risk factors of: diabetes and chronic kidney disease. Symptoms reviewed with patient who verbalized the following symptoms: no new symptoms and no worsening symptoms. Due to no new or worsening symptoms encounter was not routed to provider for escalation. Education provided regarding infection prevention, and signs and symptoms of COVID-19 and when to seek medical attention with patient who verbalized understanding. Discussed exposure protocols and quarantine from [address] you at higher risk for severe illness  and given an opportunity for questions and concerns. The patient agrees to contact the COVID-19 hotline [phone] or PCP office for questions related to their healthcare. CTN/ACM provided contact information for future reference. From CDC: Are you at higher risk for severe illness?  Wash your hands often.  Avoid close contact (6 feet, which is about two arm lengths) with people who are sick.  Put distance between yourself and other people if COVID-19 is spreading in your community.  Clean and disinfect frequently touched surfaces.  Avoid all cruise travel and non-essential air travel.  Call your healthcare professional if you have concerns about COVID-19 and your underlying condition or if you are sick. For more information on steps you can take to protect yourself, see CDC's How to Angel[place] for follow-up call in 5-7 days based on severity of symptoms and risk factors.

## 2020-07-02 ENCOUNTER — HOME CARE VISIT (OUTPATIENT)
Dept: SCHEDULING | Facility: HOME HEALTH | Age: 75
End: 2020-07-02
Payer: MEDICARE

## 2020-07-03 DIAGNOSIS — N18.9 ANEMIA OF CHRONIC RENAL FAILURE, UNSPECIFIED CKD STAGE: ICD-10-CM

## 2020-07-03 DIAGNOSIS — D63.1 ANEMIA OF CHRONIC RENAL FAILURE, UNSPECIFIED CKD STAGE: ICD-10-CM

## 2020-07-05 VITALS
DIASTOLIC BLOOD PRESSURE: 78 MMHG | HEART RATE: 80 BPM | OXYGEN SATURATION: 98 % | RESPIRATION RATE: 16 BRPM | TEMPERATURE: 97.4 F | SYSTOLIC BLOOD PRESSURE: 124 MMHG

## 2020-07-06 ENCOUNTER — HOSPITAL ENCOUNTER (OUTPATIENT)
Dept: INFUSION THERAPY | Age: 75
End: 2020-07-06

## 2020-07-07 PROCEDURE — A6212 FOAM DRG <=16 SQ IN W/BORDER: HCPCS

## 2020-07-08 ENCOUNTER — PATIENT OUTREACH (OUTPATIENT)
Dept: CASE MANAGEMENT | Age: 75
End: 2020-07-08

## 2020-07-08 NOTE — PROGRESS NOTES
Contacted patient for Transitions of Care Coordination  follow up. No answer. Left message introducing self, role and reason for call. Requested return call. Contact information provided. Patient resolved from Transition of Care episode on 7/8/20  Discussed COVID-19 related testing which was not done at this time. Test results were not done. Patient informed of results, if available? n/a     Patient/family has been provided the following resources and education related to COVID-19:                         Signs, symptoms and red flags related to COVID-19            CDC exposure and quarantine guidelines            [facility] exposure contact - [phone]            Contact for their local Department of Health                No further outreach scheduled with this CTN/ACM/LPN/HC/ MA. Episode of Care resolved. Patient has this CTN/ACM/LPN/HC/MA contact information if future needs arise.

## 2020-07-15 ENCOUNTER — HOSPITAL ENCOUNTER (INPATIENT)
Age: 75
LOS: 3 days | Discharge: HOME | DRG: 698 | End: 2020-07-18
Attending: EMERGENCY MEDICINE | Admitting: FAMILY MEDICINE
Payer: MEDICARE

## 2020-07-15 ENCOUNTER — APPOINTMENT (OUTPATIENT)
Dept: CT IMAGING | Age: 75
DRG: 698 | End: 2020-07-15
Attending: EMERGENCY MEDICINE
Payer: MEDICARE

## 2020-07-15 DIAGNOSIS — N39.0 COMPLICATED UTI (URINARY TRACT INFECTION): Primary | ICD-10-CM

## 2020-07-15 DIAGNOSIS — N18.6 ESRD (END STAGE RENAL DISEASE) ON DIALYSIS (HCC): ICD-10-CM

## 2020-07-15 DIAGNOSIS — Z99.2 ESRD (END STAGE RENAL DISEASE) ON DIALYSIS (HCC): ICD-10-CM

## 2020-07-15 LAB
ALBUMIN SERPL-MCNC: 2.8 G/DL (ref 3.4–5)
ALBUMIN/GLOB SERPL: 0.6 {RATIO} (ref 0.8–1.7)
ALP SERPL-CCNC: 181 U/L (ref 45–117)
ALT SERPL-CCNC: 13 U/L (ref 13–56)
ANION GAP SERPL CALC-SCNC: 11 MMOL/L (ref 3–18)
APPEARANCE UR: ABNORMAL
AST SERPL-CCNC: 18 U/L (ref 10–38)
BACTERIA URNS QL MICRO: ABNORMAL /HPF
BASOPHILS # BLD: 0 K/UL (ref 0–0.1)
BASOPHILS NFR BLD: 0 % (ref 0–2)
BILIRUB SERPL-MCNC: 0.3 MG/DL (ref 0.2–1)
BILIRUB UR QL: NEGATIVE
BUN SERPL-MCNC: 41 MG/DL (ref 7–18)
BUN/CREAT SERPL: 7 (ref 12–20)
CALCIUM SERPL-MCNC: 8.6 MG/DL (ref 8.5–10.1)
CHLORIDE SERPL-SCNC: 101 MMOL/L (ref 100–111)
CO2 SERPL-SCNC: 27 MMOL/L (ref 21–32)
COLOR UR: YELLOW
CREAT SERPL-MCNC: 6.02 MG/DL (ref 0.6–1.3)
DIFFERENTIAL METHOD BLD: ABNORMAL
EOSINOPHIL # BLD: 0.1 K/UL (ref 0–0.4)
EOSINOPHIL NFR BLD: 1 % (ref 0–5)
EPITH CASTS URNS QL MICRO: ABNORMAL /LPF (ref 0–5)
ERYTHROCYTE [DISTWIDTH] IN BLOOD BY AUTOMATED COUNT: 14.3 % (ref 11.6–14.5)
GLOBULIN SER CALC-MCNC: 4.9 G/DL (ref 2–4)
GLUCOSE SERPL-MCNC: 203 MG/DL (ref 74–99)
GLUCOSE UR STRIP.AUTO-MCNC: NEGATIVE MG/DL
HBA1C MFR BLD: <3.8 % (ref 4.2–5.6)
HCT VFR BLD AUTO: 25 % (ref 35–45)
HGB BLD-MCNC: 7.9 G/DL (ref 12–16)
HGB UR QL STRIP: ABNORMAL
KETONES UR QL STRIP.AUTO: NEGATIVE MG/DL
LACTATE BLD-SCNC: 1.48 MMOL/L (ref 0.4–2)
LEUKOCYTE ESTERASE UR QL STRIP.AUTO: ABNORMAL
LYMPHOCYTES # BLD: 2 K/UL (ref 0.9–3.6)
LYMPHOCYTES NFR BLD: 20 % (ref 21–52)
MCH RBC QN AUTO: 30.7 PG (ref 24–34)
MCHC RBC AUTO-ENTMCNC: 31.6 G/DL (ref 31–37)
MCV RBC AUTO: 97.3 FL (ref 74–97)
MONOCYTES # BLD: 1 K/UL (ref 0.05–1.2)
MONOCYTES NFR BLD: 10 % (ref 3–10)
NEUTS SEG # BLD: 6.9 K/UL (ref 1.8–8)
NEUTS SEG NFR BLD: 69 % (ref 40–73)
NITRITE UR QL STRIP.AUTO: NEGATIVE
PH UR STRIP: 6.5 [PH] (ref 5–8)
PLATELET # BLD AUTO: 332 K/UL (ref 135–420)
PMV BLD AUTO: 10 FL (ref 9.2–11.8)
POTASSIUM SERPL-SCNC: 3.6 MMOL/L (ref 3.5–5.5)
PROT SERPL-MCNC: 7.7 G/DL (ref 6.4–8.2)
PROT UR STRIP-MCNC: >300 MG/DL
RBC # BLD AUTO: 2.57 M/UL (ref 4.2–5.3)
RBC #/AREA URNS HPF: ABNORMAL /HPF (ref 0–5)
SODIUM SERPL-SCNC: 139 MMOL/L (ref 136–145)
SP GR UR REFRACTOMETRY: 1.02 (ref 1–1.03)
UROBILINOGEN UR QL STRIP.AUTO: 0.2 EU/DL (ref 0.2–1)
WBC # BLD AUTO: 10 K/UL (ref 4.6–13.2)
WBC URNS QL MICRO: ABNORMAL /HPF (ref 0–4)

## 2020-07-15 PROCEDURE — 80053 COMPREHEN METABOLIC PANEL: CPT

## 2020-07-15 PROCEDURE — 81001 URINALYSIS AUTO W/SCOPE: CPT

## 2020-07-15 PROCEDURE — 85025 COMPLETE CBC W/AUTO DIFF WBC: CPT

## 2020-07-15 PROCEDURE — 87086 URINE CULTURE/COLONY COUNT: CPT

## 2020-07-15 PROCEDURE — 74011250637 HC RX REV CODE- 250/637: Performed by: STUDENT IN AN ORGANIZED HEALTH CARE EDUCATION/TRAINING PROGRAM

## 2020-07-15 PROCEDURE — 99283 EMERGENCY DEPT VISIT LOW MDM: CPT

## 2020-07-15 PROCEDURE — 74176 CT ABD & PELVIS W/O CONTRAST: CPT

## 2020-07-15 PROCEDURE — 87186 SC STD MICRODIL/AGAR DIL: CPT

## 2020-07-15 PROCEDURE — 74011250636 HC RX REV CODE- 250/636: Performed by: INTERNAL MEDICINE

## 2020-07-15 PROCEDURE — 87077 CULTURE AEROBIC IDENTIFY: CPT

## 2020-07-15 PROCEDURE — 74011250636 HC RX REV CODE- 250/636: Performed by: STUDENT IN AN ORGANIZED HEALTH CARE EDUCATION/TRAINING PROGRAM

## 2020-07-15 PROCEDURE — 74011000250 HC RX REV CODE- 250: Performed by: INTERNAL MEDICINE

## 2020-07-15 PROCEDURE — 87040 BLOOD CULTURE FOR BACTERIA: CPT

## 2020-07-15 PROCEDURE — 83036 HEMOGLOBIN GLYCOSYLATED A1C: CPT

## 2020-07-15 PROCEDURE — 65270000029 HC RM PRIVATE

## 2020-07-15 PROCEDURE — 83605 ASSAY OF LACTIC ACID: CPT

## 2020-07-15 RX ORDER — ACETAMINOPHEN 325 MG/1
650 TABLET ORAL ONCE
Status: DISCONTINUED | OUTPATIENT
Start: 2020-07-15 | End: 2020-07-15 | Stop reason: SDUPTHER

## 2020-07-15 RX ORDER — HEPARIN SODIUM 5000 [USP'U]/ML
5000 INJECTION, SOLUTION INTRAVENOUS; SUBCUTANEOUS EVERY 8 HOURS
Status: DISCONTINUED | OUTPATIENT
Start: 2020-07-15 | End: 2020-07-16

## 2020-07-15 RX ORDER — SODIUM BICARBONATE 650 MG/1
1300 TABLET ORAL 3 TIMES DAILY
Status: DISCONTINUED | OUTPATIENT
Start: 2020-07-15 | End: 2020-07-18 | Stop reason: HOSPADM

## 2020-07-15 RX ORDER — MELATONIN
2000 DAILY
Status: DISCONTINUED | OUTPATIENT
Start: 2020-07-16 | End: 2020-07-18 | Stop reason: HOSPADM

## 2020-07-15 RX ORDER — TAMSULOSIN HYDROCHLORIDE 0.4 MG/1
0.4 CAPSULE ORAL DAILY
Status: DISCONTINUED | OUTPATIENT
Start: 2020-07-16 | End: 2020-07-18 | Stop reason: HOSPADM

## 2020-07-15 RX ORDER — PANTOPRAZOLE SODIUM 40 MG/1
40 TABLET, DELAYED RELEASE ORAL DAILY
Status: DISCONTINUED | OUTPATIENT
Start: 2020-07-16 | End: 2020-07-18 | Stop reason: HOSPADM

## 2020-07-15 RX ORDER — ALLOPURINOL 100 MG/1
50 TABLET ORAL DAILY
Status: DISCONTINUED | OUTPATIENT
Start: 2020-07-16 | End: 2020-07-18 | Stop reason: HOSPADM

## 2020-07-15 RX ORDER — LATANOPROST 50 UG/ML
1 SOLUTION/ DROPS OPHTHALMIC
Status: DISCONTINUED | OUTPATIENT
Start: 2020-07-15 | End: 2020-07-18 | Stop reason: HOSPADM

## 2020-07-15 RX ORDER — ACETAMINOPHEN 325 MG/1
650 TABLET ORAL 2 TIMES DAILY
Status: DISCONTINUED | OUTPATIENT
Start: 2020-07-15 | End: 2020-07-18 | Stop reason: HOSPADM

## 2020-07-15 RX ORDER — LEVOTHYROXINE SODIUM 125 UG/1
125 TABLET ORAL
Status: DISCONTINUED | OUTPATIENT
Start: 2020-07-16 | End: 2020-07-15

## 2020-07-15 RX ORDER — ALLOPURINOL 100 MG/1
200 TABLET ORAL DAILY
Status: DISCONTINUED | OUTPATIENT
Start: 2020-07-16 | End: 2020-07-15

## 2020-07-15 RX ORDER — LEVOTHYROXINE SODIUM 125 UG/1
125 TABLET ORAL
Status: DISCONTINUED | OUTPATIENT
Start: 2020-07-16 | End: 2020-07-18 | Stop reason: HOSPADM

## 2020-07-15 RX ORDER — GABAPENTIN 100 MG/1
100 CAPSULE ORAL
Status: DISCONTINUED | OUTPATIENT
Start: 2020-07-15 | End: 2020-07-18 | Stop reason: HOSPADM

## 2020-07-15 RX ORDER — ASCORBIC ACID 250 MG
500 TABLET ORAL DAILY
Status: DISCONTINUED | OUTPATIENT
Start: 2020-07-16 | End: 2020-07-18 | Stop reason: HOSPADM

## 2020-07-15 RX ORDER — LANOLIN ALCOHOL/MO/W.PET/CERES
1000 CREAM (GRAM) TOPICAL DAILY
Status: DISCONTINUED | OUTPATIENT
Start: 2020-07-16 | End: 2020-07-18 | Stop reason: HOSPADM

## 2020-07-15 RX ORDER — ONDANSETRON 4 MG/1
4 TABLET, ORALLY DISINTEGRATING ORAL
Status: DISCONTINUED | OUTPATIENT
Start: 2020-07-15 | End: 2020-07-18 | Stop reason: HOSPADM

## 2020-07-15 RX ORDER — CALCITRIOL 0.25 UG/1
0.25 CAPSULE ORAL DAILY
Status: DISCONTINUED | OUTPATIENT
Start: 2020-07-16 | End: 2020-07-18 | Stop reason: HOSPADM

## 2020-07-15 RX ADMIN — GABAPENTIN 100 MG: 100 CAPSULE ORAL at 22:00

## 2020-07-15 RX ADMIN — ACETAMINOPHEN 650 MG: 325 TABLET ORAL at 18:10

## 2020-07-15 RX ADMIN — HEPARIN SODIUM 5000 UNITS: 5000 INJECTION INTRAVENOUS; SUBCUTANEOUS at 19:08

## 2020-07-15 RX ADMIN — SODIUM BICARBONATE 650 MG TABLET 1300 MG: at 22:00

## 2020-07-15 RX ADMIN — SODIUM BICARBONATE 650 MG TABLET 1300 MG: at 18:00

## 2020-07-15 RX ADMIN — WATER 1 G: 1 INJECTION INTRAMUSCULAR; INTRAVENOUS; SUBCUTANEOUS at 19:07

## 2020-07-15 NOTE — ED TRIAGE NOTES
Patient to triage with generalized weakness, dysuria and discolored urine. Symptoms started Saturday. Pt denies N/V/D and fever/chills. The pt stated she started dialysis 3 weeks ago. The pt stated she is scheduled for dialysis 2x a week and is supposed to go again on Friday. The pt stated she is scheduled for a vascular surgery for dialysis.

## 2020-07-15 NOTE — ED PROVIDER NOTES
EMERGENCY DEPARTMENT HISTORY AND PHYSICAL EXAM    Date: 7/15/2020  Patient Name: Brice Fothergill    History of Presenting Illness     Chief Complaint   Patient presents with    Urinary Pain         History Provided By: Patient      Additional History (Context): Brice Fothergill is a 66 y.o. female with diabetes, hypertension, hyperlipidemia, obesity and ESRD on dialysis [facility] who presents with right flank pain, foul appearing urine x 2d.  +chills, nausea, poor appetite. Pain at right nephrostomy site w/dressing change this morning. Last nephrostomy tube change was prior to April noted in telemedicine visit with urologist.    PCP: Slade De La Rosa MD    Current Facility-Administered Medications   Medication Dose Route Frequency Provider Last Rate Last Dose    cefTRIAXone (ROCEPHIN) 2 g in sterile water (preservative free) 20 mL IV syringe  2 g IntraVENous NOW Agueda Raymundo PA         Current Outpatient Medications   Medication Sig Dispense Refill    biotin 1,000 mcg chew Take 1 Tab by mouth daily.  cyanocobalamin 1,000 mcg tablet Take 1,000 mcg by mouth daily.  gabapentin (NEURONTIN) 100 mg capsule Take 100 mg by mouth nightly.  lactobacillus sp. 50 billion cpu (BIO-K PLUS) 50 billion cell -375 mg cap capsule Take 1 Cap by mouth daily. 30 Cap 2    tamsulosin (FLOMAX) 0.4 mg capsule Take 1 Cap by mouth daily. 90 Cap 3    acetaminophen (TYLENOL) 325 mg tablet Take 650 mg by mouth two (2) times a day.  allopurinoL (ZYLOPRIM) 100 mg tablet Take 200 mg by mouth daily.  ascorbic acid, vitamin C, (VITAMIN C) 500 mg tablet Take 500 mg by mouth daily.  calcitRIOL (ROCALTROL) 0.25 mcg capsule Take 0.25 mcg by mouth daily.  cholecalciferol (VITAMIN D3) (2,000 UNITS /50 MCG) cap capsule Take 2,000 Units by mouth two (2) times a day. Take two tabs a total of 4000 units      latanoprost (XALATAN) 0.005 % ophthalmic solution Administer 1 Drop to both eyes nightly.  One drop at bedtime      levothyroxine (SYNTHROID) 125 mcg tablet Take 125 mcg by mouth Daily (before breakfast).  omeprazole (PRILOSEC) 20 mg capsule Take 20 mg by mouth daily.  ondansetron (ZOFRAN ODT) 4 mg disintegrating tablet Take 4 mg by mouth every eight (8) hours as needed for Nausea or Vomiting.  vit B Cmplx 3-FA-Vit C-Biotin (NEPHRO HOWIE RX) 1- mg-mg-mcg tablet Take 1 Tab by mouth daily.  fluticasone propionate (FLONASE) 50 mcg/actuation nasal spray 2 sprays in each nostril daily (Patient taking differently: 2 Sprays by Both Nostrils route daily as needed for Allergies. 2 sprays in each nostril daily As Needed for allergies) 1 Bottle 1    sodium bicarbonate 650 mg tablet Take 2 Tabs by mouth three (3) times daily. Indications: excess body acid 30 Tab 1       Past History     Past Medical History:  Past Medical History:   Diagnosis Date    Acidosis     Anemia     Arteriovenous fistula (HCC)     CKD (chronic kidney disease)     Diabetes (HCC)     HLD (hyperlipidemia)     HTN (hypertension)     Hyperparathyroidism due to renal insufficiency (HCC)     Hypothyroid     Kidney stone     Lung mass     Recurrent UTI     Ureter, stricture     Uric acid nephrolithiasis     Urinary incontinence        Past Surgical History:  Past Surgical History:   Procedure Laterality Date    HX APPENDECTOMY      HX CHOLECYSTECTOMY      HX GASTRIC BYPASS      HX KNEE ARTHROSCOPY      HX UROLOGICAL      right PCN placement    HX UROLOGICAL  07/23/2018    RIGHT URETEROSCOPY WITH HOLMIUM LASER    IR EXCHANGE NEPHRO PERC LT SI  2/21/2020    IR EXCHANGE NEPHRO PERC RT SI  4/13/2020    IR NEPHROURETERAL PERC RT PLC CATH NEW ACCESS  SI  4/30/2020       Family History:  History reviewed. No pertinent family history.     Social History:  Social History     Tobacco Use    Smoking status: Never Smoker    Smokeless tobacco: Never Used   Substance Use Topics    Alcohol use: Never     Frequency: Never    Drug use: Never       Allergies: Allergies   Allergen Reactions    Ciprofloxacin Hives    Statins-Hmg-Coa Reductase Inhibitors Other (comments)     Body ache         Review of Systems   Review of Systems   Constitutional: Positive for chills. Gastrointestinal: Positive for nausea. Genitourinary: Positive for dysuria, flank pain and frequency. All Other Systems Negative  Physical Exam     Vitals:    07/15/20 1218   BP: 126/69   Pulse: (!) 101   Resp: 20   Temp: 99.3 °F (37.4 °C)   SpO2: 93%   Weight: 101.9 kg (224 lb 10.4 oz)   Height: 5' 2\" (1.575 m)     Physical Exam  Vitals signs and nursing note reviewed. Constitutional:       Appearance: She is well-developed. HENT:      Head: Normocephalic and atraumatic. Eyes:      Pupils: Pupils are equal, round, and reactive to light. Neck:      Thyroid: No thyromegaly. Vascular: No JVD. Trachea: No tracheal deviation. Cardiovascular:      Rate and Rhythm: Normal rate and regular rhythm. Heart sounds: Normal heart sounds. No murmur. No friction rub. No gallop. Pulmonary:      Effort: Pulmonary effort is normal. No respiratory distress. Breath sounds: Normal breath sounds. No stridor. No wheezing or rales. Chest:      Chest wall: No tenderness. Abdominal:      General: There is no distension. Palpations: Abdomen is soft. There is no mass. Tenderness: There is no abdominal tenderness. There is right CVA tenderness. There is no guarding or rebound. Musculoskeletal:         General: No tenderness. Lymphadenopathy:      Cervical: No cervical adenopathy. Skin:     General: Skin is warm and dry. Coloration: Skin is not pale. Findings: No erythema or rash. Neurological:      Mental Status: She is alert and oriented to person, place, and time. Psychiatric:         Behavior: Behavior normal.         Thought Content:  Thought content normal.            Diagnostic Study Results     Labs -     Recent Results (from the past 12 hour(s))   URINALYSIS W/ RFLX MICROSCOPIC    Collection Time: 07/15/20 12:20 PM   Result Value Ref Range    Color YELLOW      Appearance OPAQUE      Specific gravity 1.025 1.003 - 1.030      pH (UA) 6.5 5.0 - 8.0      Protein >300 (A) NEG mg/dL    Glucose Negative NEG mg/dL    Ketone Negative NEG mg/dL    Bilirubin Negative NEG      Blood MODERATE (A) NEG      Urobilinogen 0.2 0.2 - 1.0 EU/dL    Nitrites Negative NEG      Leukocyte Esterase LARGE (A) NEG     URINE MICROSCOPIC ONLY    Collection Time: 07/15/20 12:20 PM   Result Value Ref Range    WBC TOO NUMEROUS TO COUNT 0 - 4 /hpf    RBC  0 - 5 /hpf     UNABLE TO QUANTITATE MICROSCOPIC PARAMETERS DUE TO EXCESSIVE WBCS    Epithelial cells  0 - 5 /lpf     UNABLE TO QUANTITATE MICROSCOPIC PARAMETERS DUE TO EXCESSIVE WBCS    Bacteria (A) NEG /hpf     UNABLE TO QUANTITATE MICROSCOPIC PARAMETERS DUE TO EXCESSIVE WBCS   CBC WITH AUTOMATED DIFF    Collection Time: 07/15/20 12:35 PM   Result Value Ref Range    WBC 10.0 4.6 - 13.2 K/uL    RBC 2.57 (L) 4.20 - 5.30 M/uL    HGB 7.9 (L) 12.0 - 16.0 g/dL    HCT 25.0 (L) 35.0 - 45.0 %    MCV 97.3 (H) 74.0 - 97.0 FL    MCH 30.7 24.0 - 34.0 PG    MCHC 31.6 31.0 - 37.0 g/dL    RDW 14.3 11.6 - 14.5 %    PLATELET 278 635 - 039 K/uL    MPV 10.0 9.2 - 11.8 FL    NEUTROPHILS 69 40 - 73 %    LYMPHOCYTES 20 (L) 21 - 52 %    MONOCYTES 10 3 - 10 %    EOSINOPHILS 1 0 - 5 %    BASOPHILS 0 0 - 2 %    ABS. NEUTROPHILS 6.9 1.8 - 8.0 K/UL    ABS. LYMPHOCYTES 2.0 0.9 - 3.6 K/UL    ABS. MONOCYTES 1.0 0.05 - 1.2 K/UL    ABS. EOSINOPHILS 0.1 0.0 - 0.4 K/UL    ABS.  BASOPHILS 0.0 0.0 - 0.1 K/UL    DF AUTOMATED     METABOLIC PANEL, COMPREHENSIVE    Collection Time: 07/15/20 12:35 PM   Result Value Ref Range    Sodium 139 136 - 145 mmol/L    Potassium 3.6 3.5 - 5.5 mmol/L    Chloride 101 100 - 111 mmol/L    CO2 27 21 - 32 mmol/L    Anion gap 11 3.0 - 18 mmol/L    Glucose 203 (H) 74 - 99 mg/dL    BUN 41 (H) 7.0 - 18 MG/DL    Creatinine 6.02 (H) 0.6 - 1.3 MG/DL    BUN/Creatinine ratio 7 (L) 12 - 20      GFR est AA 8 (L) >60 ml/min/1.73m2    GFR est non-AA 7 (L) >60 ml/min/1.73m2    Calcium 8.6 8.5 - 10.1 MG/DL    Bilirubin, total 0.3 0.2 - 1.0 MG/DL    ALT (SGPT) 13 13 - 56 U/L    AST (SGOT) 18 10 - 38 U/L    Alk. phosphatase 181 (H) 45 - 117 U/L    Protein, total 7.7 6.4 - 8.2 g/dL    Albumin 2.8 (L) 3.4 - 5.0 g/dL    Globulin 4.9 (H) 2.0 - 4.0 g/dL    A-G Ratio 0.6 (L) 0.8 - 1.7         Radiologic Studies -   CT ABD PELV WO CONT   Final Result   IMPRESSION:       1. Stable right nephroureterostomy without increasing hydronephrosis on the   stone. .   2. No secondary evidence of mass, bowel obstruction, ascites, hernia, or focal   inflammatory process. 3. Bilateral renal cystic disease. .   4. Sigmoid diverticulosis without diverticulitis. CT Results  (Last 48 hours)               07/15/20 1426  CT ABD PELV WO CONT Final result    Impression:  IMPRESSION:        1. Stable right nephroureterostomy without increasing hydronephrosis on the   stone. .   2. No secondary evidence of mass, bowel obstruction, ascites, hernia, or focal   inflammatory process. 3. Bilateral renal cystic disease. .   4. Sigmoid diverticulosis without diverticulitis. Narrative:  CT ABD PELV WO CONT       HISTORY: Right nephroureterostomy. Dysuria. COMPARISON: Noncontrast abdomen pelvic CT 5/15/2020, 2/20/2020. Mack Delgadillo PROCEDURE: Noncontrast renal stone CT was performed. Iterative techniques for   dose savings. Cor/Sag reconstructions performed. FINDINGS:        LUNG BASES: clear without pleural or pericardial effusion; 2 x 5 mm right   Fissural nodule. Moderate coronary calcium burden. Elevated eventrated right   hemidiaphragm.        LIVER: unopacified shows no mass or intrahepatic ductal dilation;       PANCREAS: normal appearance without mass, adenopathy, or peripancreatic free   fluid;       SPLEEN: normal appearance without enlargement, perisplenic collaterals, or   adenopathy; splenule. GALLBLADDER: Surgically absent;       ADRENALS: normal size and shape, no calcifications;       KIDNEYS: Lobulated atrophic left kidney, with multifocal cortical cysts, overall   unchanged. No hydronephrosis or stones. Right kidney shows nephroureterostomy. There is a loop formed in the right renal   pelvis. The right kidney does show some punctate foci of air in cortical cyst.   There is no perinephric fat stranding or stone. The right ureteral branches   normally located and coils in the bladder.;       AORTA: Normal size without significant plaque or periaortic fluid;       RETROPERITONEUM: No adenopathy or fat stranding;       BOWEL/MESENTERY: Normal size and distribution, no distention, misting,   adenopathy, or hernia; gastric bypass changes. Sigmoid diverticulosis. APPENDIX: is not inflamed;       PELVIS: No ascites, adenopathy, hernia, or focal inflammatory process; Urinary   Bladder is unremarkable except for air in the nondependent portion. Uterus is   absent. .       OSSEOUS: Multifocal degenerative changes throughout the thoracolumbar spine. No   definite compression injury. ;               CXR Results  (Last 48 hours)    None            Medical Decision Making   I am the first provider for this patient. I reviewed the vital signs, available nursing notes, past medical history, past surgical history, family history and social history. Vital Signs-Reviewed the patient's vital signs. Procedures:  Procedures    Provider Notes (Medical Decision Making): Clearly uncomfortable patient CT scan ordered shows no change in her stone or nephrostomy tube placement. Last urine culture showed sensitivity to Rocephin.   Will start Rocephin now with E. coli noted previously and ID physician Patsy Caal to consult as well as urologist Kyle Haley admitted to [facility] and Dr. Colten Garza for nephrology to be on treatment team.    MED RECONCILIATION:  Current Facility-Administered Medications   Medication Dose Route Frequency    cefTRIAXone (ROCEPHIN) 2 g in sterile water (preservative free) 20 mL IV syringe  2 g IntraVENous NOW     Current Outpatient Medications   Medication Sig    biotin 1,000 mcg chew Take 1 Tab by mouth daily.  cyanocobalamin 1,000 mcg tablet Take 1,000 mcg by mouth daily.  gabapentin (NEURONTIN) 100 mg capsule Take 100 mg by mouth nightly.  lactobacillus sp. 50 billion cpu (BIO-K PLUS) 50 billion cell -375 mg cap capsule Take 1 Cap by mouth daily.  tamsulosin (FLOMAX) 0.4 mg capsule Take 1 Cap by mouth daily.  acetaminophen (TYLENOL) 325 mg tablet Take 650 mg by mouth two (2) times a day.  allopurinoL (ZYLOPRIM) 100 mg tablet Take 200 mg by mouth daily.  ascorbic acid, vitamin C, (VITAMIN C) 500 mg tablet Take 500 mg by mouth daily.  calcitRIOL (ROCALTROL) 0.25 mcg capsule Take 0.25 mcg by mouth daily.  cholecalciferol (VITAMIN D3) (2,000 UNITS /50 MCG) cap capsule Take 2,000 Units by mouth two (2) times a day. Take two tabs a total of 4000 units    latanoprost (XALATAN) 0.005 % ophthalmic solution Administer 1 Drop to both eyes nightly. One drop at bedtime    levothyroxine (SYNTHROID) 125 mcg tablet Take 125 mcg by mouth Daily (before breakfast).  omeprazole (PRILOSEC) 20 mg capsule Take 20 mg by mouth daily.  ondansetron (ZOFRAN ODT) 4 mg disintegrating tablet Take 4 mg by mouth every eight (8) hours as needed for Nausea or Vomiting.  vit B Cmplx 3-FA-Vit C-Biotin (NEPHRO HOWIE RX) 1- mg-mg-mcg tablet Take 1 Tab by mouth daily.  fluticasone propionate (FLONASE) 50 mcg/actuation nasal spray 2 sprays in each nostril daily (Patient taking differently: 2 Sprays by Both Nostrils route daily as needed for Allergies. 2 sprays in each nostril daily As Needed for allergies)    sodium bicarbonate 650 mg tablet Take 2 Tabs by mouth three (3) times daily.  Indications: excess body acid Disposition:  admit  Follow-up Information    None         Current Discharge Medication List              Diagnosis     Clinical Impression:   1. Complicated UTI (urinary tract infection)    2.  ESRD (end stage renal disease) [facility]

## 2020-07-15 NOTE — REMOTE MONITORING
Contacted primary RN Isabella re: BC order status    [address] [facility]  [phone]

## 2020-07-15 NOTE — H&P
Admission History and Physical  [facility]      Patient: Verona Bill MRN: [id number]  [facility]: [id number]    YOB: 1945  Age: 66 y.o. Sex: female      Admission Date: 7/15/2020       HPI:     Verona Bill is a 66 y.o. female with PMH CKD5, acquired hypothyroidism, GERD, T2DM, HTN, HLD,history of recurrent nephrolithiasis, now with a nephrostomy tube(6/20), Urinary incontinence,  controlled with diet, Anemia of chronic renal failure, now presenting with complaint of progressive weakness, difficulty standing, and pain in the right flank and back. Patient states that she first started feeling poorly approximately three weeks ago, and has been in and out of the hospital multiple times since. She states that she has had a significant worsening of her weakness starting Saturday. Since then, she has felt less and less able to move, ambulate, or get up, eventually to where she was fearful she would fall and be unable to get up. She feels it has been particularly worse during and after dialysis, stating she felt significantly worse immediately after, and much weaker. Without being able to recover to any significant degree, since the previous session. Patient has had chills, nausea, and a painful right side and flank as well which have worsening in the same period.  She denies any dysuria, and has similar urinary symptoms (she is incontinent and has frequency at baseline)    ED Course (See objective for values/interpretations):  Labs obtained: CBC, Blood culture x2, Magnesium, Phosphorus, BMP, UA, UC,   Medications administered: Ceftriaxone  Imaging obtained: CT abd pelvis    Review of Systems:  General ROS: Positive for  - chills  Psychological ROS: Positive for - anxiety   Ophthalmic ROS: negative for - Vision changes  ENT ROS: negative for - headaches, hearing change  Hematological and Lymphatic ROS: negative for - bruising, jaundice  Respiratory ROS: negative for - cough, hemoptysis, or wheezing Positive for dyspnea on exertion, baseline  Cardiovascular ROS: negative for - chest pain, dyspnea on exertion, edema, loss of consciousness, or palpitations   Gastrointestinal ROS: Positive for - abdominal pain Negative for - blood in stools, change in stools, constipation, diarrhea, hematemesis, melena, nausea/vomiting or swallowing difficulty/pain  Genito-Urinary ROS: Positive for -  hematuria or urinary frequency/urgency, incontinence  Musculoskeletal ROS: Positive for - muscle weakness  Neurological ROS: Positive for - weakness  Dermatological ROS: negative for - rash or skin lesion changes    Past Medical History:   Diagnosis Date    Acidosis     Anemia     Arteriovenous fistula (HCC)     CKD (chronic kidney disease)     Diabetes ([facility] [address]     HLD (hyperlipidemia)     HTN (hypertension)     Hyperparathyroidism due to renal insufficiency (HCC)     Hypothyroid     Kidney stone     Lung mass     Recurrent UTI     Ureter, stricture     Uric acid nephrolithiasis     Urinary incontinence        Past Surgical History:   Procedure Laterality Date    HX APPENDECTOMY      HX CHOLECYSTECTOMY      HX GASTRIC BYPASS      HX KNEE ARTHROSCOPY      HX UROLOGICAL      right PCN placement    HX UROLOGICAL  07/23/2018    RIGHT URETEROSCOPY WITH HOLMIUM LASER    IR EXCHANGE NEPHRO PERC LT SI  2/21/2020    IR EXCHANGE NEPHRO PERC RT SI  4/13/2020    IR NEPHROURETERAL PERC RT PLC CATH NEW ACCESS  SI  4/30/2020       History reviewed. No pertinent family history.     Social History     Socioeconomic History    Marital status: SINGLE     Spouse name: Not on file    Number of children: Not on file    Years of education: Not on file    Highest education level: Not on file   Tobacco Use    Smoking status: Never Smoker    Smokeless tobacco: Never Used   Substance and Sexual Activity    Alcohol use: Never     Frequency: Never    Drug use: Never       Allergies   Allergen Reactions    Ciprofloxacin Hives    Statins-Hmg-Coa Reductase Inhibitors Other (comments)     Body ache       Prior to Admission Medications   Prescriptions Last Dose Informant Patient Reported? Taking?   acetaminophen (TYLENOL) 325 mg tablet 2020 at Unknown time  Yes Yes   Sig: Take 650 mg by mouth two (2) times a day. allopurinoL (ZYLOPRIM) 100 mg tablet 2020 at Unknown time  Yes Yes   Sig: Take 200 mg by mouth daily. ascorbic acid, vitamin C, (VITAMIN C) 500 mg tablet 2020 at Unknown time  Yes Yes   Sig: Take 500 mg by mouth daily. biotin 1,000 mcg chew 2020 at Unknown time  Yes Yes   Sig: Take 1 Tab by mouth daily. calcitRIOL (ROCALTROL) 0.25 mcg capsule 2020 at Unknown time  Yes Yes   Sig: Take 0.25 mcg by mouth daily. cholecalciferol (VITAMIN D3) (2,000 UNITS /50 MCG) cap capsule 2020 at Unknown time  Yes Yes   Sig: Take 2,000 Units by mouth two (2) times a day. Take two tabs a total of 4000 units   cyanocobalamin 1,000 mcg tablet 2020 at Unknown time  Yes Yes   Sig: Take 1,000 mcg by mouth daily. fluticasone propionate (FLONASE) 50 mcg/actuation nasal spray Not Taking at Unknown time  No No   Si sprays in each nostril daily   Patient taking differently: 2 Sprays by Both Nostrils route daily as needed for Allergies. 2 sprays in each nostril daily As Needed for allergies   gabapentin (NEURONTIN) 100 mg capsule 2020 at Unknown time  Yes Yes   Sig: Take 100 mg by mouth nightly. lactobacillus sp. 50 billion cpu (BIO-K PLUS) 50 billion cell -375 mg cap capsule 2020 at Unknown time  No Yes   Sig: Take 1 Cap by mouth daily. latanoprost (XALATAN) 0.005 % ophthalmic solution 2020 at Unknown time  Yes Yes   Sig: Administer 1 Drop to both eyes nightly. One drop at bedtime   levothyroxine (SYNTHROID) 125 mcg tablet 2020 at Unknown time  Yes Yes   Sig: Take 125 mcg by mouth Daily (before breakfast).    omeprazole (PRILOSEC) 20 mg capsule 2020 at Unknown time  Yes Yes Sig: Take 20 mg by mouth daily. ondansetron (ZOFRAN ODT) 4 mg disintegrating tablet 7/8/2020 at Unknown time  Yes Yes   Sig: Take 4 mg by mouth every eight (8) hours as needed for Nausea or Vomiting.   sodium bicarbonate 650 mg tablet   No No   Sig: Take 2 Tabs by mouth three (3) times daily. Indications: excess body acid   tamsulosin (FLOMAX) 0.4 mg capsule 7/14/2020 at Unknown time  No Yes   Sig: Take 1 Cap by mouth daily. vit B Cmplx 3-FA-Vit C-Biotin (NEPHRO HOWIE RX) 1- mg-mg-mcg tablet 7/14/2020 at Unknown time  Yes Yes   Sig: Take 1 Tab by mouth daily. Facility-Administered Medications: None       Physical Exam:     Patient Vitals for the past 24 hrs:   Temp Pulse Resp BP SpO2   07/15/20 1218 99.3 °F (37.4 °C) (!) 101 20 126/69 93 %       Physical Exam:   General:  AAOx3, NAD, obese, resting in bed  HEENT: Conjunctiva pink, sclera anicteric. PERRL. EOMI. Pharynx moist, nonerythematous. Moist mucous membranes. No cervical, supraclavicular, occipital or submandibular lymphadenopathy. No other gross abnormalities present. CV:  RRR, no murmurs. No visible pulsations or thrills. RESP:  Unlabored breathing. Lungs clear to auscultation without adventitious breath sounds. Equal expansion bilaterally. ABD:  Soft, Tender to deeper palpation on RLQ, RUQ, nondistended. BS (+). No hepatosplenomegaly. No suprapubic tenderness. MS:  No joint deformity or instability. No atrophy. Neuro:  CN II-XII grossly intact. 5/5 strength bilateral upper extremities and lower extremities. Ext:  No edema. 2+ radial and dp pulses bilaterally. Skin:  No rashes, lesions, or ulcers. Good turgor. Back: Right sided nephrostomy tube, clean with fresh dressing overlying. C/d/i, no drainage. Mild tenderness to palpation on the surrounding area.       Chemistry Recent Labs     07/15/20  1235   *      K 3.6      CO2 27   BUN 41*   CREA 6.02*   CA 8.6   AGAP 11   BUCR 7*   *   TP 7.7   ALB 2.8*   GLOB 4.9*   AGRAT 0.6*        CBC w/Diff Recent Labs     07/15/20  1235   WBC 10.0   RBC 2.57*   HGB 7.9*   HCT 25.0*      GRANS 69   LYMPH 20*   EOS 1        Liver Enzymes Protein, total   Date Value Ref Range Status   07/15/2020 7.7 6.4 - 8.2 g/dL Final     Albumin   Date Value Ref Range Status   07/15/2020 2.8 (L) 3.4 - 5.0 g/dL Final     Globulin   Date Value Ref Range Status   07/15/2020 4.9 (H) 2.0 - 4.0 g/dL Final     A-G Ratio   Date Value Ref Range Status   07/15/2020 0.6 (L) 0.8 - 1.7   Final     Alk. phosphatase   Date Value Ref Range Status   07/15/2020 181 (H) 45 - 117 U/L Final     Recent Labs     07/15/20  1235   TP 7.7   ALB 2.8*   GLOB 4.9*   AGRAT 0.6*   *        Lactic Acid Lactic acid   Date Value Ref Range Status   05/15/2020 1.1 0.4 - 2.0 MMOL/L Final     No results for input(s): LAC in the last 72 hours. BNP No results found for: BNP, BNPP, XBNPT     Cardiac Enzymes No results found for: CPK, CK, CKMMB, CKMB, RCK3, CKMBT, CKNDX, CKND1, PATTI, TROPT, TROIQ, GRAHAM, TROPT, TNIPOC, BNP, BNPP     Coagulation No results for input(s): PTP, INR, APTT, INREXT in the last 72 hours. Thyroid  No results found for: T4, T3U, TSH, TSHEXT       Lipid Panel No results found for: CHOL, CHOLPOCT, CHOLX, CHLST, CHOLV, 986326, HDL, HDLP, LDL, LDLC, DLDLP, 942507, VLDLC, VLDL, TGLX, TRIGL, TRIGP, TGLPOCT, CHHD, CHHDX     ABG No results for input(s): PHI, PHI, POC2, PCO2I, PO2, PO2I, HCO3, HCO3I, FIO2, FIO2I in the last 72 hours.      Urinalysis Lab Results   Component Value Date/Time    Color YELLOW 07/15/2020 12:20 PM    Appearance OPAQUE 07/15/2020 12:20 PM    Specific gravity 1.025 07/15/2020 12:20 PM    pH (UA) 6.5 07/15/2020 12:20 PM    Protein >300 (A) 07/15/2020 12:20 PM    Glucose Negative 07/15/2020 12:20 PM    Ketone Negative 07/15/2020 12:20 PM    Bilirubin Negative 07/15/2020 12:20 PM    Urobilinogen 0.2 07/15/2020 12:20 PM    Nitrites Negative 07/15/2020 12:20 PM    Leukocyte Esterase LARGE (A) 07/15/2020 12:20 PM    Epithelial cells  07/15/2020 12:20 PM     UNABLE TO QUANTITATE MICROSCOPIC PARAMETERS DUE TO EXCESSIVE WBCS    Bacteria (A) 07/15/2020 12:20 PM     UNABLE TO QUANTITATE MICROSCOPIC PARAMETERS DUE TO EXCESSIVE WBCS    WBC TOO NUMEROUS TO COUNT 07/15/2020 12:20 PM    RBC  07/15/2020 12:20 PM     UNABLE TO QUANTITATE MICROSCOPIC PARAMETERS DUE TO EXCESSIVE WBCS        Micro No results for input(s): SDES, CULT in the last 72 hours. No results for input(s): CULT in the last 72 hours. Imaging:  XR (Most Recent). Results from Hospital Encounter encounter on 06/23/20   XR CHEST PA LAT    Narrative CHEST PA AND LATERAL:    INDICATIONS: Renal failure beginning dialysis. COMPARISONS: May 15, 2020. FINDINGS:     Two views are obtained . Lungs: Clear. Cardiac Silhouette And Mediastinal Contours: Normal.    Pleural Spaces: No pneumothorax or pleural effusion evident. Bones And Soft Tissues: Unremarkable for age. Impression IMPRESSION:    No acute or active disease evident. CT (Most Recent) Results from Hospital Encounter encounter on 07/15/20   CT ABD PELV WO CONT    Narrative CT ABD PELV WO CONT    HISTORY: Right nephroureterostomy. Dysuria. COMPARISON: Noncontrast abdomen pelvic CT 5/15/2020, 2/20/2020. Casey Linton PROCEDURE: Noncontrast renal stone CT was performed. Iterative techniques for  dose savings. Cor/Sag reconstructions performed. FINDINGS:     LUNG BASES: clear without pleural or pericardial effusion; 2 x 5 mm right  Fissural nodule. Moderate coronary calcium burden. Elevated eventrated right  hemidiaphragm. LIVER: unopacified shows no mass or intrahepatic ductal dilation;    PANCREAS: normal appearance without mass, adenopathy, or peripancreatic free  fluid;    SPLEEN: normal appearance without enlargement, perisplenic collaterals, or  adenopathy; splenule.     GALLBLADDER: Surgically absent;    ADRENALS: normal size and shape, no calcifications;    KIDNEYS: Lobulated atrophic left kidney, with multifocal cortical cysts, overall  unchanged. No hydronephrosis or stones. Right kidney shows nephroureterostomy. There is a loop formed in the right renal  pelvis. The right kidney does show some punctate foci of air in cortical cyst.  There is no perinephric fat stranding or stone. The right ureteral branches  normally located and coils in the bladder.;    AORTA: Normal size without significant plaque or periaortic fluid;    RETROPERITONEUM: No adenopathy or fat stranding;    BOWEL/MESENTERY: Normal size and distribution, no distention, misting,  adenopathy, or hernia; gastric bypass changes. Sigmoid diverticulosis. APPENDIX: is not inflamed;    PELVIS: No ascites, adenopathy, hernia, or focal inflammatory process; Urinary  Bladder is unremarkable except for air in the nondependent portion. Uterus is  absent. .    OSSEOUS: Multifocal degenerative changes throughout the thoracolumbar spine. No  definite compression injury. ;      Impression IMPRESSION:     1. Stable right nephroureterostomy without increasing hydronephrosis on the  stone. .  2. No secondary evidence of mass, bowel obstruction, ascites, hernia, or focal  inflammatory process. 3. Bilateral renal cystic disease. .  4. Sigmoid diverticulosis without diverticulitis. ECHO No results found for this or any previous visit. EKG No results found for this or any previous visit.      Recent Results (from the past 12 hour(s))   URINALYSIS W/ RFLX MICROSCOPIC    Collection Time: 07/15/20 12:20 PM   Result Value Ref Range    Color YELLOW      Appearance OPAQUE      Specific gravity 1.025 1.003 - 1.030      pH (UA) 6.5 5.0 - 8.0      Protein >300 (A) NEG mg/dL    Glucose Negative NEG mg/dL    Ketone Negative NEG mg/dL    Bilirubin Negative NEG      Blood MODERATE (A) NEG      Urobilinogen 0.2 0.2 - 1.0 EU/dL    Nitrites Negative NEG      Leukocyte Esterase LARGE (A) NEG     URINE MICROSCOPIC ONLY    Collection Time: 07/15/20 12:20 PM   Result Value Ref Range    WBC TOO NUMEROUS TO COUNT 0 - 4 /hpf    RBC  0 - 5 /hpf     UNABLE TO QUANTITATE MICROSCOPIC PARAMETERS DUE TO EXCESSIVE WBCS    Epithelial cells  0 - 5 /lpf     UNABLE TO QUANTITATE MICROSCOPIC PARAMETERS DUE TO EXCESSIVE WBCS    Bacteria (A) NEG /hpf     UNABLE TO QUANTITATE MICROSCOPIC PARAMETERS DUE TO EXCESSIVE WBCS   CBC WITH AUTOMATED DIFF    Collection Time: 07/15/20 12:35 PM   Result Value Ref Range    WBC 10.0 4.6 - 13.2 K/uL    RBC 2.57 (L) 4.20 - 5.30 M/uL    HGB 7.9 (L) 12.0 - 16.0 g/dL    HCT 25.0 (L) 35.0 - 45.0 %    MCV 97.3 (H) 74.0 - 97.0 FL    MCH 30.7 24.0 - 34.0 PG    MCHC 31.6 31.0 - 37.0 g/dL    RDW 14.3 11.6 - 14.5 %    PLATELET 629 434 - 337 K/uL    MPV 10.0 9.2 - 11.8 FL    NEUTROPHILS 69 40 - 73 %    LYMPHOCYTES 20 (L) 21 - 52 %    MONOCYTES 10 3 - 10 %    EOSINOPHILS 1 0 - 5 %    BASOPHILS 0 0 - 2 %    ABS. NEUTROPHILS 6.9 1.8 - 8.0 K/UL    ABS. LYMPHOCYTES 2.0 0.9 - 3.6 K/UL    ABS. MONOCYTES 1.0 0.05 - 1.2 K/UL    ABS. EOSINOPHILS 0.1 0.0 - 0.4 K/UL    ABS. BASOPHILS 0.0 0.0 - 0.1 K/UL    DF AUTOMATED     METABOLIC PANEL, COMPREHENSIVE    Collection Time: 07/15/20 12:35 PM   Result Value Ref Range    Sodium 139 136 - 145 mmol/L    Potassium 3.6 3.5 - 5.5 mmol/L    Chloride 101 100 - 111 mmol/L    CO2 27 21 - 32 mmol/L    Anion gap 11 3.0 - 18 mmol/L    Glucose 203 (H) 74 - 99 mg/dL    BUN 41 (H) 7.0 - 18 MG/DL    Creatinine 6.02 (H) 0.6 - 1.3 MG/DL    BUN/Creatinine ratio 7 (L) 12 - 20      GFR est AA 8 (L) >60 ml/min/1.73m2    GFR est non-AA 7 (L) >60 ml/min/1.73m2    Calcium 8.6 8.5 - 10.1 MG/DL    Bilirubin, total 0.3 0.2 - 1.0 MG/DL    ALT (SGPT) 13 13 - 56 U/L    AST (SGOT) 18 10 - 38 U/L    Alk. phosphatase 181 (H) 45 - 117 U/L    Protein, total 7.7 6.4 - 8.2 g/dL    Albumin 2.8 (L) 3.4 - 5.0 g/dL    Globulin 4.9 (H) 2.0 - 4.0 g/dL    A-G Ratio 0.6 (L) 0.8 - 1.7         Assessment/Plan:   66 y.o. female with PMH CKD5, acquired hypothyroidism, GERD, T2DM, HTN, HLD,history of recurrent nephrolithiasis, now with a nephrostomy tube(6/20), Urinary incontinence,  controlled with diet, Anemia of chronic renal failure, now presenting with complaint of progressive weakness, difficulty standing, and pain in the right flank and back. 1. Complicated UTI vs pyelonephritis 2/2 nephrostomy tube/nephrolithiasis, with history of recurrent UTI. Patient presented with 3 days of weakness, Right sided flank pain, his a history of recurrent UTI, stones, and nephrostomy and stent for stone. Also Hx of strictures, severe hydronephrosis. No leucocytosis in ED, CT unimpressive for infection, tachycardic in the ED. Vitals stable otherwise. UA with too numerous WBC, Large Leucocyte esterase, (-) nitrites, moderate blood.    -Admit with tele   -Started rocephin in ED, continue. Previous UCx was susceptible   -FU Urine culture   -Consult/follow ID recs   -Consult urology    -continue home allopurinol, flomax, probiotics   -VS per unit routine   -Daily bmp, cbc   -PT/OT    CKD5/ESRD  On dialysis twice weekly. Cr elevated 6.02 with baseline approx 3.5. Last dialysis 7/13   -Consult nephrology   -Renal dose medication   -Trend Cr    Anemia/Anemia of chronic disease/kidney failure, 2/2 ESRD  Currently HGB 7.9, down from month ago at 9.2.  Last iron study 5/8/2020: Iron 72, TIBC 263, Iron saturation 27, Ferritin 70. HgB 7.9 on admission, no signs of active bleeding.   -likely part of ESRD   -Monitor daily cbc   -Defer to nephrology for dialysis and related anemia injections and treatments   -consider blood transfusion if HGB <7.0    Chronic Metabolic Acidosis likely 2/2 to her ESRD  - Continue on home NaHCO2 650mg two tablets TID    Hx of HTN   -previously on amlodipine and coreg, but stopped due to low BP.    -monitor BP    Hx of T2DM, peripheral neuropathy   -Last HbA1c <3.5   -Repeat HbA1c   -Taking Gabapentin, continue 100mg QHs   -Monitor via BMP, no need for accucheck at this time, will adjust if needed. GERD   -On omeprazole at home   -Protonix 40mg every day    Hypothyroidism   -Continue synthroid 125mcg, home dose    Glaucoma   -Continue home meds/eye drops          Diet Renal   DVT Prophylaxis SQH   GI Prophylaxis Protonix   Code status DNR/DNI   Disposition >3 nights     Point of Contact [employer]  Relationship: Son  [phone]      Wilbert Rose MD , PGY-1   [address]   Intern Pager: [phone]   July 15, 2020, 3:48 PM           Admission History and Physical  [facility]        Patient: Carroll Flores MRN: [id number]  CSN: [id number]    YOB: 1945  Age: 66 y.o. Sex: female       Admission Date: 7/15/2020       HPI:      Carroll Flores is a 66 y.o. female with PMH ESRD on HD, hx of recurrent MDR UTIs/stones s/p R nephrostomy tube (06/20), HTN, DM II w/ neuropathy, anemia, galucoma, GERD, now presenting with complaint of generalized weakness and brown tinged urine.     Patient states that she started dialysis 2 weeks ago, and has had progressively worsening weakness since. She started feeling much worse on Saturday, 5 days ago. States she has been so weak that it has been hard for her to walk and get out of bed. Had n/v/diarrhea on Monday which has since resolved. Has noticed decreased appetite. Denies any fevers/chills. Also endorsed some back pain. Patient is incontinent at baseline, noticed today that her urine was brown-tinged and she also noted dysuria and frequency.      Patient states that she is supposed to be having some sort of vascular procedure related to her fistula which is scheduled tomorrow. Does not remember the name of her vascular doctor.      Just started dialysis 2 weeks ago. States that she has been having a really hard time tolerating/adjusting to dialysis. .. was very teary and emotional as she was talking with me.  Is starting off with dialysis twice a week (mon/fri). Last session was 2 days ago, next session was scheduled for Friday. Follows with Dr. Loyda Wood.      She lives with her son who helps take care of her. Has been staying at home since the start of the pandemic, no respiratory symptoms, no sick contacts.      Patient endorses a 30 lbs weight loss in the past few months that she attributes to less eating/poor appetite.      ED Course (See objective for values/interpretations):  Labs obtained: CBC, CMP, UA  Medications administered: Rocephin  Imaging obtained: CT A/P     Review of Systems:  General ROS: negative for  - chills, fever. Positive for weight loss  Psychological ROS: positive for - anxiety and depression  Ophthalmic ROS: negative for - blurry vision, decreased vision or loss of vision  ENT ROS: negative for - headaches, visual changes  Respiratory ROS: negative for - cough, wheezing. Positive for exertional SOB (chronic).   Cardiovascular ROS: negative for - chest pain, edema, loss of consciousness, or palpitations   Gastrointestinal ROS: negative for - abdominal pain positive for nausea/vomiting, diarrhea  Genito-Urinary ROS: positive for - incontinence, dysuria, change in urine color, urinary frequency, flank pain  Musculoskeletal ROS: positive for generalized weakness  Neurological ROS: negative for -headaches, positive for numbness/tingling  Dermatological ROS: negative for - rash or skin lesion changes          Past Medical History:   Diagnosis Date    Acidosis      Anemia      Arteriovenous fistula (HCC)      CKD (chronic kidney disease)      Diabetes ([facility] [address]      HLD (hyperlipidemia)      HTN (hypertension)      Hyperparathyroidism due to renal insufficiency (HCC)      Hypothyroid      Kidney stone      Lung mass      Recurrent UTI      Ureter, stricture      Uric acid nephrolithiasis      Urinary incontinence                Past Surgical History:   Procedure Laterality Date    HX APPENDECTOMY        HX CHOLECYSTECTOMY        HX GASTRIC BYPASS        HX KNEE ARTHROSCOPY        HX UROLOGICAL         right PCN placement    HX UROLOGICAL   07/23/2018     RIGHT URETEROSCOPY WITH HOLMIUM LASER    IR EXCHANGE NEPHRO PERC LT SI   2/21/2020    IR EXCHANGE NEPHRO PERC RT SI   4/13/2020    IR NEPHROURETERAL PERC RT PLC CATH NEW ACCESS  SI   4/30/2020        History reviewed. No pertinent family history.     Social History               Socioeconomic History    Marital status: SINGLE       Spouse name: Not on file    Number of children: Not on file    Years of education: Not on file    Highest education level: Not on file   Tobacco Use    Smoking status: Never Smoker    Smokeless tobacco: Never Used   Substance and Sexual Activity    Alcohol use: Never       Frequency: Never    Drug use: Never                 Allergies   Allergen Reactions    Ciprofloxacin Hives    Statins-Hmg-Coa Reductase Inhibitors Other (comments)       Body ache        Prior to Admission Medications   Prescriptions Last Dose Informant Patient Reported? Taking?   acetaminophen (TYLENOL) 325 mg tablet 7/14/2020 at Unknown time   Yes Yes   Sig: Take 650 mg by mouth two (2) times a day. allopurinoL (ZYLOPRIM) 100 mg tablet 7/14/2020 at Unknown time   Yes Yes   Sig: Take 200 mg by mouth daily. ascorbic acid, vitamin C, (VITAMIN C) 500 mg tablet 7/14/2020 at Unknown time   Yes Yes   Sig: Take 500 mg by mouth daily. biotin 1,000 mcg chew 7/14/2020 at Unknown time   Yes Yes   Sig: Take 1 Tab by mouth daily. calcitRIOL (ROCALTROL) 0.25 mcg capsule 7/14/2020 at Unknown time   Yes Yes   Sig: Take 0.25 mcg by mouth daily. cholecalciferol (VITAMIN D3) (2,000 UNITS /50 MCG) cap capsule 7/14/2020 at Unknown time   Yes Yes   Sig: Take 2,000 Units by mouth two (2) times a day. Take two tabs a total of 4000 units   cyanocobalamin 1,000 mcg tablet 7/14/2020 at Unknown time   Yes Yes   Sig: Take 1,000 mcg by mouth daily.    fluticasone propionate (FLONASE) 50 mcg/actuation nasal spray Not Taking at Unknown time   No No   Si sprays in each nostril daily   Patient taking differently: 2 Sprays by Both Nostrils route daily as needed for Allergies. 2 sprays in each nostril daily As Needed for allergies   gabapentin (NEURONTIN) 100 mg capsule 2020 at Unknown time   Yes Yes   Sig: Take 100 mg by mouth nightly. lactobacillus sp. 50 billion cpu (BIO-K PLUS) 50 billion cell -375 mg cap capsule 2020 at Unknown time   No Yes   Sig: Take 1 Cap by mouth daily. latanoprost (XALATAN) 0.005 % ophthalmic solution 2020 at Unknown time   Yes Yes   Sig: Administer 1 Drop to both eyes nightly. One drop at bedtime   levothyroxine (SYNTHROID) 125 mcg tablet 2020 at Unknown time   Yes Yes   Sig: Take 125 mcg by mouth Daily (before breakfast). omeprazole (PRILOSEC) 20 mg capsule 2020 at Unknown time   Yes Yes   Sig: Take 20 mg by mouth daily. ondansetron (ZOFRAN ODT) 4 mg disintegrating tablet 2020 at Unknown time   Yes Yes   Sig: Take 4 mg by mouth every eight (8) hours as needed for Nausea or Vomiting.   sodium bicarbonate 650 mg tablet     No No   Sig: Take 2 Tabs by mouth three (3) times daily. Indications: excess body acid   tamsulosin (FLOMAX) 0.4 mg capsule 2020 at Unknown time   No Yes   Sig: Take 1 Cap by mouth daily. vit B Cmplx 3-FA-Vit C-Biotin (NEPHRO HOWIE RX) 1- mg-mg-mcg tablet 2020 at Unknown time   Yes Yes   Sig: Take 1 Tab by mouth daily. Facility-Administered Medications: None         Physical Exam:      Patient Vitals for the past 24 hrs:    Temp Pulse Resp BP SpO2   07/15/20 1218 99.3 °F (37.4 °C) (!) 101 20 126/69 93 %        Physical Exam:   General:  AAOx3, NAD, teary throughout the interview  HEENT: Conjunctiva pink, sclera anicteric. PERRL. EOMI. Pharynx moist, nonerythematous. Dry mucous membranes. CV:  RRR, no murmurs. No visible pulsations or thrills. RESP:  Unlabored breathing.   Lungs clear to auscultation without adventitious breath sounds. Equal expansion bilaterally. ABD:  Soft, nontender, nondistended. BS (+). No suprapubic tenderness. Right sided nephrostomy, surrounding area c/d/i. Tenderness to palpation surrounding the nephrostomy tube and extending further down the right side of her back and around into her flank. MS:  Palpable thrill in LUE with large ecchymosis overlying  Neuro:  CN II-XII grossly intact. 5/5 strength bilateral upper extremities and lower extremities. Ext:  No edema. 2+ radial pulses bilaterally. Skin:  No rashes, lesions, or ulcers. Good turgor.        Chemistry     Recent Labs     07/15/20  1235   *      K 3.6      CO2 27   BUN 41*   CREA 6.02*   CA 8.6   AGAP 11   BUCR 7*   *   TP 7.7   ALB 2.8*   GLOB 4.9*   AGRAT 0.6*         CBC w/Diff     Recent Labs     07/15/20  1235   WBC 10.0   RBC 2.57*   HGB 7.9*   HCT 25.0*      GRANS 69   LYMPH 20*   EOS 1         Liver Enzymes       Protein, total   Date Value Ref Range Status   07/15/2020 7.7 6.4 - 8.2 g/dL Final            Albumin   Date Value Ref Range Status   07/15/2020 2.8 (L) 3.4 - 5.0 g/dL Final            Globulin   Date Value Ref Range Status   07/15/2020 4.9 (H) 2.0 - 4.0 g/dL Final            A-G Ratio   Date Value Ref Range Status   07/15/2020 0.6 (L) 0.8 - 1.7   Final            Alk.  phosphatase   Date Value Ref Range Status   07/15/2020 181 (H) 45 - 117 U/L Final         Recent Labs     07/15/20  1235   TP 7.7   ALB 2.8*   GLOB 4.9*   AGRAT 0.6*   *            Urinalysis        Lab Results   Component Value Date/Time     Color YELLOW 07/15/2020 12:20 PM     Appearance OPAQUE 07/15/2020 12:20 PM     Specific gravity 1.025 07/15/2020 12:20 PM     pH (UA) 6.5 07/15/2020 12:20 PM     Protein >300 (A) 07/15/2020 12:20 PM     Glucose Negative 07/15/2020 12:20 PM     Ketone Negative 07/15/2020 12:20 PM     Bilirubin Negative 07/15/2020 12:20 PM     Urobilinogen 0.2 07/15/2020 12:20 PM     Nitrites Negative 07/15/2020 12:20 PM     Leukocyte Esterase LARGE (A) 07/15/2020 12:20 PM     Epithelial cells   07/15/2020 12:20 PM       UNABLE TO QUANTITATE MICROSCOPIC PARAMETERS DUE TO EXCESSIVE WBCS     Bacteria (A) 07/15/2020 12:20 PM       UNABLE TO QUANTITATE MICROSCOPIC PARAMETERS DUE TO EXCESSIVE WBCS     WBC TOO NUMEROUS TO COUNT 07/15/2020 12:20 PM     RBC   07/15/2020 12:20 PM       UNABLE TO QUANTITATE MICROSCOPIC PARAMETERS DUE TO EXCESSIVE WBCS            CT (Most Recent)      Results from Hospital Encounter encounter on 07/15/20   CT ABD PELV WO CONT     Narrative CT ABD PELV WO CONT     HISTORY: Right nephroureterostomy. Dysuria.     COMPARISON: Noncontrast abdomen pelvic CT 5/15/2020, 2/20/2020. .     PROCEDURE: Noncontrast renal stone CT was performed. Iterative techniques for  dose savings. Cor/Sag reconstructions performed.     FINDINGS:      LUNG BASES: clear without pleural or pericardial effusion; 2 x 5 mm right  Fissural nodule. Moderate coronary calcium burden. Elevated eventrated right  hemidiaphragm.     LIVER: unopacified shows no mass or intrahepatic ductal dilation;     PANCREAS: normal appearance without mass, adenopathy, or peripancreatic free  fluid;     SPLEEN: normal appearance without enlargement, perisplenic collaterals, or  adenopathy; splenule.     GALLBLADDER: Surgically absent;     ADRENALS: normal size and shape, no calcifications;     KIDNEYS: Lobulated atrophic left kidney, with multifocal cortical cysts, overall  unchanged. No hydronephrosis or stones.     Right kidney shows nephroureterostomy. There is a loop formed in the right renal  pelvis. The right kidney does show some punctate foci of air in cortical cyst.  There is no perinephric fat stranding or stone. The right ureteral branches  normally located and coils in the bladder. ;     AORTA: Normal size without significant plaque or periaortic fluid;     RETROPERITONEUM: No adenopathy or fat stranding;     BOWEL/MESENTERY: Normal size and distribution, no distention, misting,  adenopathy, or hernia; gastric bypass changes. Sigmoid diverticulosis.     APPENDIX: is not inflamed;     PELVIS: No ascites, adenopathy, hernia, or focal inflammatory process; Urinary  Bladder is unremarkable except for air in the nondependent portion. Uterus is  absent. .     OSSEOUS: Multifocal degenerative changes throughout the thoracolumbar spine. No  definite compression injury. ;        Impression IMPRESSION:      1. Stable right nephroureterostomy without increasing hydronephrosis on the  stone. .  2. No secondary evidence of mass, bowel obstruction, ascites, hernia, or focal  inflammatory process. 3. Bilateral renal cystic disease. .  4. Sigmoid diverticulosis without diverticulitis.                   Recent Results          Recent Results (from the past 12 hour(s))   URINALYSIS W/ RFLX MICROSCOPIC     Collection Time: 07/15/20 12:20 PM   Result Value Ref Range     Color YELLOW       Appearance OPAQUE       Specific gravity 1.025 1.003 - 1.030       pH (UA) 6.5 5.0 - 8.0       Protein >300 (A) NEG mg/dL     Glucose Negative NEG mg/dL     Ketone Negative NEG mg/dL     Bilirubin Negative NEG       Blood MODERATE (A) NEG       Urobilinogen 0.2 0.2 - 1.0 EU/dL     Nitrites Negative NEG       Leukocyte Esterase LARGE (A) NEG     URINE MICROSCOPIC ONLY     Collection Time: 07/15/20 12:20 PM   Result Value Ref Range     WBC TOO NUMEROUS TO COUNT 0 - 4 /hpf     RBC   0 - 5 /hpf       UNABLE TO QUANTITATE MICROSCOPIC PARAMETERS DUE TO EXCESSIVE WBCS     Epithelial cells   0 - 5 /lpf       UNABLE TO QUANTITATE MICROSCOPIC PARAMETERS DUE TO EXCESSIVE WBCS     Bacteria (A) NEG /hpf       UNABLE TO QUANTITATE MICROSCOPIC PARAMETERS DUE TO EXCESSIVE WBCS   CBC WITH AUTOMATED DIFF     Collection Time: 07/15/20 12:35 PM   Result Value Ref Range     WBC 10.0 4.6 - 13.2 K/uL     RBC 2.57 (L) 4.20 - 5.30 M/uL     HGB 7.9 (L) 12.0 - 16.0 g/dL   HCT 25.0 (L) 35.0 - 45.0 %     MCV 97.3 (H) 74.0 - 97.0 FL     MCH 30.7 24.0 - 34.0 PG     MCHC 31.6 31.0 - 37.0 g/dL     RDW 14.3 11.6 - 14.5 %     PLATELET 864 158 - 620 K/uL     MPV 10.0 9.2 - 11.8 FL     NEUTROPHILS 69 40 - 73 %     LYMPHOCYTES 20 (L) 21 - 52 %     MONOCYTES 10 3 - 10 %     EOSINOPHILS 1 0 - 5 %     BASOPHILS 0 0 - 2 %     ABS. NEUTROPHILS 6.9 1.8 - 8.0 K/UL     ABS. LYMPHOCYTES 2.0 0.9 - 3.6 K/UL     ABS. MONOCYTES 1.0 0.05 - 1.2 K/UL     ABS. EOSINOPHILS 0.1 0.0 - 0.4 K/UL     ABS. BASOPHILS 0.0 0.0 - 0.1 K/UL     DF AUTOMATED     METABOLIC PANEL, COMPREHENSIVE     Collection Time: 07/15/20 12:35 PM   Result Value Ref Range     Sodium 139 136 - 145 mmol/L     Potassium 3.6 3.5 - 5.5 mmol/L     Chloride 101 100 - 111 mmol/L     CO2 27 21 - 32 mmol/L     Anion gap 11 3.0 - 18 mmol/L     Glucose 203 (H) 74 - 99 mg/dL     BUN 41 (H) 7.0 - 18 MG/DL     Creatinine 6.02 (H) 0.6 - 1.3 MG/DL     BUN/Creatinine ratio 7 (L) 12 - 20       GFR est AA 8 (L) >60 ml/min/1.73m2     GFR est non-AA 7 (L) >60 ml/min/1.73m2     Calcium 8.6 8.5 - 10.1 MG/DL     Bilirubin, total 0.3 0.2 - 1.0 MG/DL     ALT (SGPT) 13 13 - 56 U/L     AST (SGOT) 18 10 - 38 U/L     Alk. phosphatase 181 (H) 45 - 117 U/L     Protein, total 7.7 6.4 - 8.2 g/dL     Albumin 2.8 (L) 3.4 - 5.0 g/dL     Globulin 4.9 (H) 2.0 - 4.0 g/dL     A-G Ratio 0.6 (L) 0.8 - 1.7             Assessment/Plan:   66 y. o. female with PMH ESRD on HD, hx of recurrent MDR UTIs/stones s/p R nephrostomy tube (06/20), HTN, DM II w/ neuropathy, anemia, galucoma, GERD, now admitted with complicated UTI.     Complicated UTI/Hx of recurrent UTI/nephrolithiasis w/MDR s/p R Nephrostomy tube placement 2019  Patient presented with 3 days of weakness, nausea and right sided back pain. Patient has hx of recurrent uric acid kidney stones and MDR UTIs. She is incontinent.  Also has hx of strictures and severe hydronephrosis for which she had a R nephrostomy tube placed in 2019. Of note, patient underwent IR guided R Nephrostomy tube exchange on 1/28/20 with Dr. Montez Tsang 4/30/2020. Home health nurse changes nephroureterostomy tube weekly, last changed this morning. No leukocytosis in the ED. CT without any fat stranding or worsening hydronephrosis, nephroureterostomy tubes in place. 1/4 SIRS criteria with tachycardia of 101. Otherwise vitals stable. UA with too numerous to count WBCs, large leuk esterase, negative nitrites, moderate blood.   - Admit to telemetry  - Continue Ceftriaxone- prior Ucx have been pan-susseptible, will f/u urine culture and ID recs   - Consult urology  - Consult ID  - Strict I&Os  - F/U UCx and blood cxs   - Continue Probiotics (patient has hx of c diff with ABX)  - Continue home Allopurinol for hx uric acid stones  - Continue home Flomax  - VS per unit routine  - Daily daily CBC/BMP  - PT/OT     ESRD  Cr 6.02 upon admission; baseline ~3.2-3.5 from records in Epic. Follows with Dr. Kena De La O. Recently started twice weekly dialysis 2 weeks ago. Last dialysis 2 days ago (7/13). - Will consult nephrology, appreciate recs   - Trend Cr with daily BMP  - Renally dose meds, avoid nephrotoxic drugs     Anemia of Chronic Disease likely 2/2 to her ESRD. Last iron study 5/8/2020: Iron 72, TIBC 263, Iron saturation 27, Ferritin 70. HgB 7.9 on admission, no signs of active bleeding. Patient has been on Procrit and Venofer, per son has been getting these at dialysis since 2 weeks ago. Will defer these injections to Nephrology. - Monitor daily CBC   - monitor for signs of bleeding       Chronic Metabolic Acidosis likely 2/2 to her ESRD  - Continue on home NaHCO2 650mg two tablets TID     Hx of HTN: Was previously taking Amlodipine and Coreg, but these were recently discontinued due to low/nrml Bps in the outpatient setting. SBP WNL on arrival to ED.   - Monitor BP     Hx of DM II w/ neuropathy  Last HbA1C <3.5% 2/21/2020.  Takes Gabapentin 100mg QHS for neuropathy  - Continue to monitor BG thorough daily BMPs   - Continue home Gabapentin      GERD - chronic hx  On Omeprazole at home  - Protonix 40mg qday while admitted     Hypothyroidism - chronic   - Continue home Synthroid 125 mcg po qhs     Glaucoma  - continue home Latanoprost     Diet Renal Diet    DVT Prophylaxis Lovenox- renal dosing   GI Prophylaxis Protonix   Code status DNR/DNI   Disposition Telemetry      Point of Contact Jillian Wall  Relationship:  [phone]         Jourdan Soto MD , PGY-2   [address] Medicine   Senior Pager: [phone]   May 15, 2020, 1:04 PM

## 2020-07-15 NOTE — CONSULTS
Infectious Disease Consultation Note        Reason: PCN tube associated UTI    Current abx Prior abx   None      Assessment :  UTI  Hx of recurrent UTI/stones  --s/p Rt nephrostomy tube (since 9/2018)  --Last cx growing E coli  --Last tube change 2/2020 from what I can see    ESRD, new dialysis    Generalized weakness    Anemia    Cipro allergy    Recommendations:  --Follow up urology evaluation, for PCN tube change vs other intervention such as removal  --Given her hx of infections prior to the most recent one will d/c ceftriaxone and add on cefepime to cover for acinetobacter/PSAR. --Monitor results of urine cx  --Check blood cx x 2  --Hemoglobin per primary  --Alter abx as needed based on clinical course & cx data  --Additional recs to come pending return of data    Thank you for consultation request. Above plan was discussed in details with patient. Please call me if any further questions or concerns. Will continue to participate in the care of this patient. HPI:  Pt is a pleasant 65 yo female with a PMH of renal stones, ureteral stricture, percutaneous nephrostomy tube placed in 2018 with recurrent UTI, ESRD now on new dialysis, who reports that she has been feeling weaker and weaker at home, denies SOB or cough or fever, and finally consented to have dialysis. She thought it would make her feel better but instead it made her feel worse. She states that she was given iron with her last dialysis and that it made her vomit. She has a PCN tube it seems last changed 2/2020 (reports that it is very costly for her to have it exchanged) and that recently she developed some pain the size of her palm at the site of placement and it had greenish drainage per her son who changes her dressings. She currently denies f/c/n/v/abd pain. She denies new rash or skin changes. She has a touch of Rt sided suprapubic pain as well.   Denies known sick contacts    Past Medical History:   Diagnosis Date    Acidosis     Anemia     Arteriovenous fistula (HCC)     CKD (chronic kidney disease)     Diabetes ([facility] [address]     HLD (hyperlipidemia)     HTN (hypertension)     Hyperparathyroidism due to renal insufficiency (HCC)     Hypothyroid     Kidney stone     Lung mass     Recurrent UTI     Ureter, stricture     Uric acid nephrolithiasis     Urinary incontinence        Past Surgical History:   Procedure Laterality Date    HX APPENDECTOMY      HX CHOLECYSTECTOMY      HX GASTRIC BYPASS      HX KNEE ARTHROSCOPY      HX UROLOGICAL      right PCN placement    HX UROLOGICAL  07/23/2018    RIGHT URETEROSCOPY WITH HOLMIUM LASER    IR EXCHANGE NEPHRO PERC LT SI  2/21/2020    IR EXCHANGE NEPHRO PERC RT SI  4/13/2020    IR NEPHROURETERAL PERC RT PLC CATH NEW ACCESS  SI  4/30/2020       Patient's Medications   Start Taking    No medications on file   Continue Taking    ACETAMINOPHEN (TYLENOL) 325 MG TABLET    Take 650 mg by mouth two (2) times a day. ALLOPURINOL (ZYLOPRIM) 100 MG TABLET    Take 200 mg by mouth daily. ASCORBIC ACID, VITAMIN C, (VITAMIN C) 500 MG TABLET    Take 500 mg by mouth daily. BIOTIN 1,000 MCG CHEW    Take 1 Tab by mouth daily. CALCITRIOL (ROCALTROL) 0.25 MCG CAPSULE    Take 0.25 mcg by mouth daily. CHOLECALCIFEROL (VITAMIN D3) (2,000 UNITS /50 MCG) CAP CAPSULE    Take 2,000 Units by mouth two (2) times a day. Take two tabs a total of 4000 units    CYANOCOBALAMIN 1,000 MCG TABLET    Take 1,000 mcg by mouth daily. GABAPENTIN (NEURONTIN) 100 MG CAPSULE    Take 100 mg by mouth nightly. LACTOBACILLUS SP. 50 BILLION CPU (BIO-K PLUS) 50 BILLION CELL -375 MG CAP CAPSULE    Take 1 Cap by mouth daily. LATANOPROST (XALATAN) 0.005 % OPHTHALMIC SOLUTION    Administer 1 Drop to both eyes nightly. One drop at bedtime    LEVOTHYROXINE (SYNTHROID) 125 MCG TABLET    Take 125 mcg by mouth Daily (before breakfast). OMEPRAZOLE (PRILOSEC) 20 MG CAPSULE    Take 20 mg by mouth daily.     ONDANSETRON [facility] ODT) 4 MG DISINTEGRATING TABLET    Take 4 mg by mouth every eight (8) hours as needed for Nausea or Vomiting. SODIUM BICARBONATE 650 MG TABLET    Take 2 Tabs by mouth three (3) times daily. Indications: excess body acid    TAMSULOSIN (FLOMAX) 0.4 MG CAPSULE    Take 1 Cap by mouth daily. VIT B CMPLX 3-FA-VIT C-BIOTIN (NEPHRO HOWIE RX) 1- MG-MG-MCG TABLET    Take 1 Tab by mouth daily. These Medications have changed    No medications on file   Stop Taking    FLUTICASONE PROPIONATE (FLONASE) 50 MCG/ACTUATION NASAL SPRAY    2 sprays in each nostril daily       Current Facility-Administered Medications   Medication Dose Route Frequency    cefTRIAXone (ROCEPHIN) 2 g in sterile water (preservative free) 20 mL IV syringe  2 g IntraVENous NOW    acetaminophen (TYLENOL) tablet 650 mg  650 mg Oral ONCE     Current Outpatient Medications   Medication Sig    biotin 1,000 mcg chew Take 1 Tab by mouth daily.  cyanocobalamin 1,000 mcg tablet Take 1,000 mcg by mouth daily.  gabapentin (NEURONTIN) 100 mg capsule Take 100 mg by mouth nightly.  lactobacillus sp. 50 billion cpu (BIO-K PLUS) 50 billion cell -375 mg cap capsule Take 1 Cap by mouth daily.  tamsulosin (FLOMAX) 0.4 mg capsule Take 1 Cap by mouth daily.  acetaminophen (TYLENOL) 325 mg tablet Take 650 mg by mouth two (2) times a day.  allopurinoL (ZYLOPRIM) 100 mg tablet Take 200 mg by mouth daily.  ascorbic acid, vitamin C, (VITAMIN C) 500 mg tablet Take 500 mg by mouth daily.  calcitRIOL (ROCALTROL) 0.25 mcg capsule Take 0.25 mcg by mouth daily.  cholecalciferol (VITAMIN D3) (2,000 UNITS /50 MCG) cap capsule Take 2,000 Units by mouth two (2) times a day. Take two tabs a total of 4000 units    latanoprost (XALATAN) 0.005 % ophthalmic solution Administer 1 Drop to both eyes nightly. One drop at bedtime    levothyroxine (SYNTHROID) 125 mcg tablet Take 125 mcg by mouth Daily (before breakfast).     omeprazole (PRILOSEC) 20 mg capsule Take 20 mg by mouth daily.  ondansetron (ZOFRAN ODT) 4 mg disintegrating tablet Take 4 mg by mouth every eight (8) hours as needed for Nausea or Vomiting.  vit B Cmplx 3-FA-Vit C-Biotin (NEPHRO HOWIE RX) 1- mg-mg-mcg tablet Take 1 Tab by mouth daily.  sodium bicarbonate 650 mg tablet Take 2 Tabs by mouth three (3) times daily. Indications: excess body acid       Allergies: Ciprofloxacin and Statins-hmg-coa reductase inhibitors    History reviewed. No pertinent family history.   Social History     Socioeconomic History    Marital status: SINGLE     Spouse name: Not on file    Number of children: Not on file    Years of education: Not on file    Highest education level: Not on file   Occupational History    Not on file   Social Needs    Financial resource strain: Not on file    Food insecurity     Worry: Not on file     Inability: Not on file    Transportation needs     Medical: Not on file     Non-medical: Not on file   Tobacco Use    Smoking status: Never Smoker    Smokeless tobacco: Never Used   Substance and Sexual Activity    Alcohol use: Never     Frequency: Never    Drug use: Never    Sexual activity: Not on file   Lifestyle    Physical activity     Days per week: Not on file     Minutes per session: Not on file    Stress: Not on file   Relationships    Social connections     Talks on phone: Not on file     Gets together: Not on file     Attends Baptism service: Not on file     Active member of club or organization: Not on file     Attends meetings of clubs or organizations: Not on file     Relationship status: Not on file    Intimate partner violence     Fear of current or ex partner: Not on file     Emotionally abused: Not on file     Physically abused: Not on file     Forced sexual activity: Not on file   Other Topics Concern    Not on file   Social History Narrative    Not on file     Social History     Tobacco Use   Smoking Status Never Smoker   Smokeless Tobacco Never Used        Temp (24hrs), Av.3 °F (37.4 °C), Min:99.3 °F (37.4 °C), Max:99.3 °F (37.4 °C)    Visit Vitals  /69 (BP 1 Location: Right arm, BP Patient Position: Sitting)   Pulse (!) 101   Temp 99.3 °F (37.4 °C)   Resp 20   Ht 5' 2\" (1.575 m)   Wt 101.9 kg (224 lb 10.4 oz)   SpO2 93%   BMI 41.09 kg/m²       ROS: 12 point ROS obtained in details. Pertinent positives as mentioned in HPI,   otherwise negative    Physical Exam:    General: Well developed, well nourished female laying on the bed, in no acute distress. HEENT: EOMI, anicteric sclera, no oral lesions or thrush  PULM: CTAB  CV: RRR no m/r/g aprpeciated  ABD: bs+, soft, NT, ND  EXT: no edema b/l LE  SKIN: no acute rash on limited skin exam, some ecchymoses to the LUE  BACK: Rt flank PCN with clean dressing in place  NEURO:A&O x 4, nonfocal      Labs: Results:   Chemistry Recent Labs     07/15/20  1235   *      K 3.6      CO2 27   BUN 41*   CREA 6.02*   CA 8.6   AGAP 11   BUCR 7*   *   TP 7.7   ALB 2.8*   GLOB 4.9*   AGRAT 0.6*      CBC w/Diff Recent Labs     07/15/20  1235   WBC 10.0   RBC 2.57*   HGB 7.9*   HCT 25.0*      GRANS 69   LYMPH 20*   EOS 1      Microbiology No results for input(s): CULT in the last 72 hours. RADIOLOGY:  CT A/P 7/15:  1. Stable right nephroureterostomy without increasing hydronephrosis on the  stone. 2. No secondary evidence of mass, bowel obstruction, ascites, hernia, or focal  inflammatory process. 3. Bilateral renal cystic disease. .  4. Sigmoid diverticulosis without diverticulitis    Dr. Kashmir Guerin, Infectious Disease Specialist  July 15, 2020

## 2020-07-16 ENCOUNTER — APPOINTMENT (OUTPATIENT)
Dept: VASCULAR SURGERY | Age: 75
DRG: 698 | End: 2020-07-16
Attending: SURGERY
Payer: MEDICARE

## 2020-07-16 LAB
ANION GAP SERPL CALC-SCNC: 9 MMOL/L (ref 3–18)
APTT PPP: 46.9 SEC (ref 23–36.4)
BASOPHILS # BLD: 0 K/UL (ref 0–0.1)
BASOPHILS NFR BLD: 0 % (ref 0–2)
BUN SERPL-MCNC: 47 MG/DL (ref 7–18)
BUN/CREAT SERPL: 8 (ref 12–20)
CALCIUM SERPL-MCNC: 8.2 MG/DL (ref 8.5–10.1)
CHLORIDE SERPL-SCNC: 101 MMOL/L (ref 100–111)
CO2 SERPL-SCNC: 30 MMOL/L (ref 21–32)
COVID-19 RAPID TEST, COVR: NOT DETECTED
CREAT SERPL-MCNC: 6.23 MG/DL (ref 0.6–1.3)
DIFFERENTIAL METHOD BLD: ABNORMAL
EOSINOPHIL # BLD: 0.1 K/UL (ref 0–0.4)
EOSINOPHIL NFR BLD: 1 % (ref 0–5)
ERYTHROCYTE [DISTWIDTH] IN BLOOD BY AUTOMATED COUNT: 14.5 % (ref 11.6–14.5)
GLUCOSE BLD STRIP.AUTO-MCNC: 111 MG/DL (ref 70–110)
GLUCOSE BLD STRIP.AUTO-MCNC: 129 MG/DL (ref 70–110)
GLUCOSE BLD STRIP.AUTO-MCNC: 166 MG/DL (ref 70–110)
GLUCOSE BLD STRIP.AUTO-MCNC: 171 MG/DL (ref 70–110)
GLUCOSE SERPL-MCNC: 133 MG/DL (ref 74–99)
HCT VFR BLD AUTO: 23.4 % (ref 35–45)
HGB BLD-MCNC: 7.4 G/DL (ref 12–16)
INR PPP: 1.2 (ref 0.8–1.2)
LEFT ARTERIAL PROX ANASTOMOSIS AVF EDV: 91.7 CM/S
LEFT ARTERIAL PROX ANASTOMOSIS AVF PSV: 177.9 CM/S
LEFT AVF AVG DIST ANAST VOL FLOW: 2106 ML/MIN
LEFT AVF AVG DIST OUTFLOW VOL FLOW: 1420 ML/MIN
LEFT AVF AVG MID OUTFLOW VOL FLOW: 2609 ML/MIN
LEFT AVF AVG OUTFLOW VESSEL NAME: NORMAL
LEFT AVF AVG PROX ANAST VOL FLOW: 840 ML/MIN
LEFT AVF AVG PROX OUTFLOW VOL FLOW: 1350 ML/MIN
LEFT DIST OUTFLOW AVF EDV: 51.7 CM/S
LEFT DIST OUTFLOW AVF PSV: 132.5 CM/S
LEFT INFLOW ARTERY AVF EDV: 113.6 CM/S
LEFT INFLOW ARTERY AVF PSV: 223 CM/S
LEFT MID OUTFLOW AVF EDV: 75 CM/S
LEFT MID OUTFLOW AVF PSV: 165.6 CM/S
LEFT OUTFLOW VESSEL AVF EDV: 38.7 CM/S
LEFT OUTFLOW VESSEL AVF PSV: 73.9 CM/S
LEFT PROX OUTFLOW AVF EDV: 41.8 CM/S
LEFT PROX OUTFLOW AVF PSV: 83.2 CM/S
LEFT VENOUS DIST ANASTOMOSIS AVF EDV: 38.7 CM/S
LEFT VENOUS DIST ANASTOMOSIS AVF PSV: 71.7 CM/S
LYMPHOCYTES # BLD: 2 K/UL (ref 0.9–3.6)
LYMPHOCYTES NFR BLD: 25 % (ref 21–52)
MAGNESIUM SERPL-MCNC: 1.8 MG/DL (ref 1.6–2.6)
MCH RBC QN AUTO: 31.1 PG (ref 24–34)
MCHC RBC AUTO-ENTMCNC: 31.6 G/DL (ref 31–37)
MCV RBC AUTO: 98.3 FL (ref 74–97)
MONOCYTES # BLD: 0.9 K/UL (ref 0.05–1.2)
MONOCYTES NFR BLD: 11 % (ref 3–10)
NEUTS SEG # BLD: 5.1 K/UL (ref 1.8–8)
NEUTS SEG NFR BLD: 63 % (ref 40–73)
PHOSPHATE SERPL-MCNC: 4.7 MG/DL (ref 2.5–4.9)
PLATELET # BLD AUTO: 298 K/UL (ref 135–420)
PMV BLD AUTO: 9.8 FL (ref 9.2–11.8)
POTASSIUM SERPL-SCNC: 3.4 MMOL/L (ref 3.5–5.5)
PROTHROMBIN TIME: 14.9 SEC (ref 11.5–15.2)
RBC # BLD AUTO: 2.38 M/UL (ref 4.2–5.3)
SODIUM SERPL-SCNC: 140 MMOL/L (ref 136–145)
SOURCE, COVRS: NORMAL
WBC # BLD AUTO: 8.1 K/UL (ref 4.6–13.2)

## 2020-07-16 PROCEDURE — 74011250636 HC RX REV CODE- 250/636: Performed by: STUDENT IN AN ORGANIZED HEALTH CARE EDUCATION/TRAINING PROGRAM

## 2020-07-16 PROCEDURE — 84100 ASSAY OF PHOSPHORUS: CPT

## 2020-07-16 PROCEDURE — 80048 BASIC METABOLIC PNL TOTAL CA: CPT

## 2020-07-16 PROCEDURE — 97165 OT EVAL LOW COMPLEX 30 MIN: CPT

## 2020-07-16 PROCEDURE — 36415 COLL VENOUS BLD VENIPUNCTURE: CPT

## 2020-07-16 PROCEDURE — 97535 SELF CARE MNGMENT TRAINING: CPT

## 2020-07-16 PROCEDURE — 65270000029 HC RM PRIVATE

## 2020-07-16 PROCEDURE — 82962 GLUCOSE BLOOD TEST: CPT

## 2020-07-16 PROCEDURE — 87635 SARS-COV-2 COVID-19 AMP PRB: CPT

## 2020-07-16 PROCEDURE — 85610 PROTHROMBIN TIME: CPT

## 2020-07-16 PROCEDURE — 85025 COMPLETE CBC W/AUTO DIFF WBC: CPT

## 2020-07-16 PROCEDURE — 74011250637 HC RX REV CODE- 250/637: Performed by: STUDENT IN AN ORGANIZED HEALTH CARE EDUCATION/TRAINING PROGRAM

## 2020-07-16 PROCEDURE — 93990 DOPPLER FLOW TESTING: CPT

## 2020-07-16 PROCEDURE — 85730 THROMBOPLASTIN TIME PARTIAL: CPT

## 2020-07-16 PROCEDURE — 74011000250 HC RX REV CODE- 250: Performed by: INTERNAL MEDICINE

## 2020-07-16 PROCEDURE — 74011250636 HC RX REV CODE- 250/636: Performed by: INTERNAL MEDICINE

## 2020-07-16 PROCEDURE — 77030027138 HC INCENT SPIROMETER -A

## 2020-07-16 PROCEDURE — 83735 ASSAY OF MAGNESIUM: CPT

## 2020-07-16 PROCEDURE — 97161 PT EVAL LOW COMPLEX 20 MIN: CPT

## 2020-07-16 RX ORDER — OXYBUTYNIN CHLORIDE 5 MG/1
5 TABLET ORAL 3 TIMES DAILY
Status: CANCELLED | OUTPATIENT
Start: 2020-07-16

## 2020-07-16 RX ORDER — POTASSIUM CHLORIDE 20 MEQ/1
40 TABLET, EXTENDED RELEASE ORAL EVERY 4 HOURS
Status: DISPENSED | OUTPATIENT
Start: 2020-07-16 | End: 2020-07-16

## 2020-07-16 RX ORDER — SODIUM CHLORIDE 9 MG/ML
250 INJECTION, SOLUTION INTRAVENOUS
Status: DISCONTINUED | OUTPATIENT
Start: 2020-07-16 | End: 2020-07-18 | Stop reason: HOSPADM

## 2020-07-16 RX ORDER — ACETAMINOPHEN 325 MG/1
650 TABLET ORAL ONCE
Status: ACTIVE | OUTPATIENT
Start: 2020-07-16 | End: 2020-07-16

## 2020-07-16 RX ORDER — MORPHINE SULFATE 2 MG/ML
1 INJECTION, SOLUTION INTRAMUSCULAR; INTRAVENOUS ONCE
Status: COMPLETED | OUTPATIENT
Start: 2020-07-16 | End: 2020-07-16

## 2020-07-16 RX ADMIN — ACETAMINOPHEN 650 MG: 325 TABLET ORAL at 08:44

## 2020-07-16 RX ADMIN — SODIUM BICARBONATE 650 MG TABLET 1300 MG: at 08:44

## 2020-07-16 RX ADMIN — ALLOPURINOL 50 MG: 100 TABLET ORAL at 08:44

## 2020-07-16 RX ADMIN — PANTOPRAZOLE SODIUM 40 MG: 40 TABLET, DELAYED RELEASE ORAL at 08:44

## 2020-07-16 RX ADMIN — LEVOTHYROXINE SODIUM 125 MCG: 125 TABLET ORAL at 06:13

## 2020-07-16 RX ADMIN — ACETAMINOPHEN 650 MG: 325 TABLET ORAL at 18:26

## 2020-07-16 RX ADMIN — NEPHROCAP 1 CAPSULE: 1 CAP ORAL at 08:44

## 2020-07-16 RX ADMIN — MORPHINE SULFATE 1 MG: 2 INJECTION, SOLUTION INTRAMUSCULAR; INTRAVENOUS at 20:25

## 2020-07-16 RX ADMIN — CHOLECALCIFEROL TAB 25 MCG (1000 UNIT) 2 TABLET: 25 TAB at 08:43

## 2020-07-16 RX ADMIN — WATER 1 G: 1 INJECTION INTRAMUSCULAR; INTRAVENOUS; SUBCUTANEOUS at 18:26

## 2020-07-16 RX ADMIN — CYANOCOBALAMIN TAB 1000 MCG 1000 MCG: 1000 TAB at 08:45

## 2020-07-16 RX ADMIN — SODIUM BICARBONATE 650 MG TABLET 1300 MG: at 21:54

## 2020-07-16 RX ADMIN — TAMSULOSIN HYDROCHLORIDE 0.4 MG: 0.4 CAPSULE ORAL at 08:44

## 2020-07-16 RX ADMIN — Medication 1 CAPSULE: at 09:00

## 2020-07-16 RX ADMIN — Medication 500 MG: at 08:43

## 2020-07-16 RX ADMIN — GABAPENTIN 100 MG: 100 CAPSULE ORAL at 21:54

## 2020-07-16 RX ADMIN — HEPARIN SODIUM 5000 UNITS: 5000 INJECTION INTRAVENOUS; SUBCUTANEOUS at 12:23

## 2020-07-16 RX ADMIN — POTASSIUM CHLORIDE 40 MEQ: 1500 TABLET, EXTENDED RELEASE ORAL at 16:38

## 2020-07-16 RX ADMIN — SODIUM BICARBONATE 650 MG TABLET 1300 MG: at 16:38

## 2020-07-16 RX ADMIN — CALCITRIOL CAPSULES 0.25 MCG 0.25 MCG: 0.25 CAPSULE ORAL at 08:44

## 2020-07-16 NOTE — ED NOTES
TRANSFER - OUT REPORT:    Verbal report given to [employer] [facility] (name) on [place]  being transferred to [facility] (unit) for routine progression of care       Report consisted of patients Situation, Background, Assessment and   Recommendations(SBAR). Information from the following report(s) SBAR, Kardex, Procedure Summary, Recent Results and Med Rec Status was reviewed with the receiving nurse. Lines:   Venous Access Device AV Fistula (Active)       Peripheral IV 07/15/20 Right;Upper Arm (Active)   Site Assessment Clean, dry, & intact 07/15/20 1731   Phlebitis Assessment 0 07/15/20 1731   Infiltration Assessment 0 07/15/20 1731   Dressing Status Clean, dry, & intact 07/15/20 1731   Dressing Type Transparent 07/15/20 1731   Hub Color/Line Status Patent; Flushed 07/15/20 1731        Opportunity for questions and clarification was provided.       Patient transported with:   [facility]

## 2020-07-16 NOTE — PROGRESS NOTES
Problem: Self Care Deficits Care Plan (Adult)  Goal: *Acute Goals and Plan of Care (Insert Text)  Outcome: Resolved/Met       OCCUPATIONAL THERAPY EVALUATION/DISCHARGE    Patient: Beena Dunbar (75 y.o. female)  Date: 7/16/2020  Primary Diagnosis: Complicated UTI (urinary tract infection) [N39.0]  Precautions: Fall  PLOF: Patient was modified independent with self-care and used a RW for functional mobility PTA. ASSESSMENT AND RECOMMENDATIONS:  Based on the objective data described below, the patient presents with no deficits that impede pt function with ADLs, functional transfers, and functional mobility. t this time patient is safe to d/c home with family support. OT to d/c from caseload at this time. Skilled occupational therapy is not indicated at this time.   Discharge Recommendations: Home Health  Further Equipment Recommendations for Discharge: tub transfer bench to decrease the risk of falls     SUBJECTIVE:   Patient stated I just finished OT and PT and now I feel like this is a set back for me    OBJECTIVE DATA SUMMARY:     Past Medical History:   Diagnosis Date    Acidosis     Anemia     Arteriovenous fistula (HCC)     CKD (chronic kidney disease)     Diabetes ([facility] [address]     HLD (hyperlipidemia)     HTN (hypertension)     Hyperparathyroidism due to renal insufficiency (HCC)     Hypothyroid     Kidney stone     Lung mass     Recurrent UTI     Ureter, stricture     Uric acid nephrolithiasis     Urinary incontinence      Past Surgical History:   Procedure Laterality Date    HX APPENDECTOMY      HX CHOLECYSTECTOMY      HX GASTRIC BYPASS      HX KNEE ARTHROSCOPY      HX UROLOGICAL      right PCN placement    HX UROLOGICAL  07/23/2018    RIGHT URETEROSCOPY WITH HOLMIUM LASER    IR EXCHANGE NEPHRO PERC LT SI  2/21/2020    IR EXCHANGE NEPHRO PERC RT SI  4/13/2020    IR NEPHROURETERAL PERC RT PLC CATH NEW ACCESS  SI  4/30/2020     Barriers to Learning/Limitations: None  Compensate with: visual, verbal, tactile, kinesthetic cues/model    Home Situation:   Home Situation  Home Environment: Private residence  # Steps to Enter: 3  Rails to Enter: Yes  One/Two Story Residence: Two story(has chair lift)  Lift Chair Available: Yes  Living Alone: No  Support Systems: Family member(s), Child(isaura)  Patient Expects to be Discharged to[de-identified] Private residence  Current DME Used/Available at Home: Commode, bedside, Edward Reel, straight, Walker, rolling, Walker, rollator  Tub or Shower Type: (basin bath)  [x]     Right hand dominant   []     Left hand dominant    Cognitive/Behavioral Status:  Neurologic State: Alert  Orientation Level: Oriented to person;Oriented to place;Oriented to situation  Cognition: Follows commands  Safety/Judgement: Fall prevention    Skin: Intact  Edema: None noted    Vision/Perceptual:    Acuity: Within Defined Limits      Coordination: BUE  Fine Motor Skills-Upper: Left Intact; Right Intact    Gross Motor Skills-Upper: Left Intact; Right Intact    Balance:  Sitting: Intact  Standing: Impaired; With support  Standing - Static: Good  Standing - Dynamic : Fair(+)    Strength: BUE  Strength: Generally decreased, functional    Tone & Sensation: BUE  Tone: Normal  Sensation: Impaired(numbness in B hands; reports not new issue)    Range of Motion: BUE  AROM: Generally decreased, functional(R shoulder 2/2 PMHx)    Functional Mobility and Transfers for ADLs:  Bed Mobility:  Rolling: Supervision  Supine to Sit: Supervision  Scooting: Supervision    Transfers:  Sit to Stand: Supervision  Stand to Sit: Supervision   Toilet Transfer : Supervision(simulation with recliner)    ADL Assessment:  Feeding: Independent    Oral Facial Hygiene/Grooming: Modified Independent    Bathing: Modified independent    Upper Body Dressing: Modified independent    Lower Body Dressing: Modified independent    Toileting: Modified independent    ADL Intervention:  Upper Body Dressing Assistance  Dressing Assistance: Modified independent  Hospital Gown: Modified independent    Lower Body Dressing Assistance  Dressing Assistance: Modified independent  Underpants: Modified independent(mesh underwear with pad)  Leg Crossed Method Used: No  Position Performed: Seated edge of bed;Bending forward method    Cognitive Retraining  Safety/Judgement: Fall prevention    Pain:  Pain level pre-treatment: 0/10, \"soreness in stomach\"   Pain level post-treatment: 0/10   Pain Intervention(s): Medication (see MAR); Response to intervention: Nurse notified, See doc flow    Activity Tolerance:   Good    Please refer to the flowsheet for vital signs taken during this treatment. After treatment:   [x]  Patient left in no apparent distress sitting up in chair  []  Patient left in no apparent distress in bed  [x]  Call bell left within reach  [x]  Nursing notified  []  Caregiver present  []  Bed alarm activated    COMMUNICATION/EDUCATION:   [x]      Role of Occupational Therapy in the acute care setting  [x]      Home safety education was provided and the patient/caregiver indicated understanding. [x]      Patient/family have participated as able and agree with findings and recommendations. []      Patient is unable to participate in plan of care at this time. Thank you for this referral.  Doni Singer OTR/L  Time Calculation: 23 mins      Eval Complexity: History: LOW Complexity : Brief history review ; Examination: LOW Complexity : 1-3 performance deficits relating to physical, cognitive , or psychosocial skils that result in activity limitations and / or participation restrictions ;    Decision Making:LOW Complexity : No comorbidities that affect functional and no verbal or physical assistance needed to complete eval tasks

## 2020-07-16 NOTE — CONSULTS
Consult Note  Consult requested by: Dr. Madison Oneal is a 66 y.o. female [facility] who is being seen on consult for ESRD management. Chief Complaint   Patient presents with    Urinary Pain     Admission diagnosis: Complicated UTI (urinary tract infection)     64y F with ESRD, HTN, h/o complex recurrent UTI, admitted for UTI, following for ESRD management. Impression & Plan:   IMPRESSION:   ESRD, MWF  Access; left arm fistula, relatively deep, not able to cannulate successfully outpatient. Complicated UTI, nephrolithiasis has right nephrostomy tube,  Anemia   HTN   PLAN:    Plan for HD tomorrow once okay from vascular colleague, she may need surgery to improve accessibility for her fistula. abx per ID colleague. Start on epogen. Thank you very much for allowing me to participate in the care of this patient. We will continue to monitor with you and make recommendations as needed.       HPI: 24-year-old female with past medical history of ESRD, history of recurrent urinary tract infection, status post right nephrectomy, hypertension anemia came to the emergency room with weakness cloudy urine. She admit she feels that she has another episode of UTI. She was recently started on dialysis few week ago. She was not able to receive dialysis as I prescribed because of her access problem. In emergency room visit, she does not have any severe electrolyte imbalance. She is admitted to the floor been evaluated by urology and infectious disease team.  She is on IV antibiotics. She was also evaluated by vascular colleague and planning to get Doppler today.       Past Medical History:   Diagnosis Date    Acidosis     Anemia     Arteriovenous fistula (HCC)     CKD (chronic kidney disease)     Diabetes (HCC)     HLD (hyperlipidemia)     HTN (hypertension)     Hyperparathyroidism due to renal insufficiency (HCC)     Hypothyroid     Kidney stone     Lung mass     Recurrent UTI     Ureter, stricture     Uric acid nephrolithiasis     Urinary incontinence       Past Surgical History:   Procedure Laterality Date    HX APPENDECTOMY      HX CHOLECYSTECTOMY      HX GASTRIC BYPASS      HX KNEE ARTHROSCOPY      HX UROLOGICAL      right PCN placement    HX UROLOGICAL  07/23/2018    RIGHT URETEROSCOPY WITH HOLMIUM LASER    IR EXCHANGE NEPHRO PERC LT SI  2/21/2020    IR EXCHANGE NEPHRO PERC RT SI  4/13/2020    IR NEPHROURETERAL PERC RT PLC CATH NEW ACCESS  SI  4/30/2020       Social History     Socioeconomic History    Marital status: SINGLE     Spouse name: Not on file    Number of children: Not on file    Years of education: Not on file    Highest education level: Not on file   Occupational History    Not on file   Social Needs    Financial resource strain: Not on file    Food insecurity     Worry: Not on file     Inability: Not on file    Transportation needs     Medical: Not on file     Non-medical: Not on file   Tobacco Use    Smoking status: Never Smoker    Smokeless tobacco: Never Used   Substance and Sexual Activity    Alcohol use: Never     Frequency: Never    Drug use: Never    Sexual activity: Not on file   Lifestyle    Physical activity     Days per week: Not on file     Minutes per session: Not on file    Stress: Not on file   Relationships    Social connections     Talks on phone: Not on file     Gets together: Not on file     Attends Hindu service: Not on file     Active member of club or organization: Not on file     Attends meetings of clubs or organizations: Not on file     Relationship status: Not on file    Intimate partner violence     Fear of current or ex partner: Not on file     Emotionally abused: Not on file     Physically abused: Not on file     Forced sexual activity: Not on file   Other Topics Concern    Not on file   Social History Narrative    Not on file       History reviewed. No pertinent family history.   Allergies   Allergen Reactions    Ciprofloxacin Hives    Statins-Hmg-Coa Reductase Inhibitors Other (comments)     Body ache        Home Medications:     Prior to Admission Medications   Prescriptions Last Dose Informant Patient Reported? Taking?   acetaminophen (TYLENOL) 325 mg tablet Unknown at Unknown time  Yes No   Sig: Take 650 mg by mouth two (2) times a day. allopurinoL (ZYLOPRIM) 100 mg tablet Unknown at Unknown time  Yes No   Sig: Take 200 mg by mouth daily. ascorbic acid, vitamin C, (VITAMIN C) 500 mg tablet Unknown at Unknown time  Yes No   Sig: Take 500 mg by mouth daily. biotin 1,000 mcg chew Unknown at Unknown time  Yes No   Sig: Take 1 Tab by mouth daily. calcitRIOL (ROCALTROL) 0.25 mcg capsule Unknown at Unknown time  Yes No   Sig: Take 0.25 mcg by mouth daily. cholecalciferol (VITAMIN D3) (2,000 UNITS /50 MCG) cap capsule Unknown at Unknown time  Yes No   Sig: Take 2,000 Units by mouth two (2) times a day. Take two tabs a total of 4000 units   cyanocobalamin 1,000 mcg tablet Unknown at Unknown time  Yes No   Sig: Take 1,000 mcg by mouth daily. gabapentin (NEURONTIN) 100 mg capsule Unknown at Unknown time  Yes No   Sig: Take 100 mg by mouth nightly. lactobacillus sp. 50 billion cpu (BIO-K PLUS) 50 billion cell -375 mg cap capsule Unknown at Unknown time  No No   Sig: Take 1 Cap by mouth daily. latanoprost (XALATAN) 0.005 % ophthalmic solution Unknown at Unknown time  Yes No   Sig: Administer 1 Drop to both eyes nightly. One drop at bedtime   levothyroxine (SYNTHROID) 125 mcg tablet Unknown at Unknown time  Yes No   Sig: Take 125 mcg by mouth Daily (before breakfast). omeprazole (PRILOSEC) 20 mg capsule Unknown at Unknown time  Yes No   Sig: Take 20 mg by mouth daily.    ondansetron (ZOFRAN ODT) 4 mg disintegrating tablet Unknown at Unknown time  Yes No   Sig: Take 4 mg by mouth every eight (8) hours as needed for Nausea or Vomiting.   sodium bicarbonate 650 mg tablet Unknown at Unknown time  No No Sig: Take 2 Tabs by mouth three (3) times daily. Indications: excess body acid   tamsulosin (FLOMAX) 0.4 mg capsule Unknown at Unknown time  No No   Sig: Take 1 Cap by mouth daily. vit B Cmplx 3-FA-Vit C-Biotin (NEPHRO HOWIE RX) 1- mg-mg-mcg tablet Unknown at Unknown time  Yes No   Sig: Take 1 Tab by mouth daily. Facility-Administered Medications: None       Current Facility-Administered Medications   Medication Dose Route Frequency    potassium chloride (K-DUR, KLOR-CON) SR tablet 40 mEq  40 mEq Oral Q4H    acetaminophen (TYLENOL) tablet 650 mg  650 mg Oral BID    ascorbic acid (vitamin C) (VITAMIN C) tablet 500 mg  500 mg Oral DAILY    calcitRIOL (ROCALTROL) capsule 0.25 mcg  0.25 mcg Oral DAILY    cholecalciferol (VITAMIN D3) (1000 Units /25 mcg) tablet 2 Tab  2,000 Units Oral DAILY    cyanocobalamin tablet 1,000 mcg  1,000 mcg Oral DAILY    gabapentin (NEURONTIN) capsule 100 mg  100 mg Oral QHS    latanoprost (XALATAN) 0.005 % ophthalmic solution 1 Drop  1 Drop Both Eyes QHS    pantoprazole (PROTONIX) tablet 40 mg  40 mg Oral DAILY    ondansetron (ZOFRAN ODT) tablet 4 mg  4 mg Oral Q8H PRN    sodium bicarbonate tablet 1,300 mg  1,300 mg Oral TID    tamsulosin (FLOMAX) capsule 0.4 mg  0.4 mg Oral DAILY    B complex-vitaminC-folic acid (NEPHROCAP) cap  1 Cap Oral DAILY    levothyroxine (SYNTHROID) tablet 125 mcg  125 mcg Oral 6am    lactobacillus sp. 50 billion cpu (BIO-K PLUS) capsule 1 Cap  1 Cap Oral DAILY    allopurinoL (ZYLOPRIM) tablet 50 mg  50 mg Oral DAILY    cefepime (MAXIPIME) 1 g in sterile water (preservative free) 10 mL IV syringe  1 g IntraVENous Q24H       Review of Systems:     Complete 10-point review of systems were obtained and discussed in length  with the patient. Complete review of systems was negative/unremarkable  except as mentioned in HPI section.   Data Review:    Labs: Results:       Chemistry Recent Labs     07/16/20  0515 07/15/20  1235   * 203*  139   K 3.4* 3.6    101   CO2 30 27   BUN 47* 41*   CREA 6.23* 6.02*   CA 8.2* 8.6   AGAP 9 11   BUCR 8* 7*   AP  --  181*   TP  --  7.7   ALB  --  2.8*   GLOB  --  4.9*   AGRAT  --  0.6*      CBC w/Diff Recent Labs     07/16/20  0515 07/15/20  1235   WBC 8.1 10.0   RBC 2.38* 2.57*   HGB 7.4* 7.9*   HCT 23.4* 25.0*    332   GRANS 63 69   LYMPH 25 20*   EOS 1 1      Coagulation No results for input(s): PTP, INR, APTT, INREXT in the last 72 hours. Iron/Ferritin No results for input(s): IRON in the last 72 hours. No lab exists for component: TIBCCALC   BNP No results for input(s): BNPP in the last 72 hours. Cardiac Enzymes No results for input(s): CPK, CKND1, PATTI in the last 72 hours.     No lab exists for component: CKRMB, TROIP   Liver Enzymes Recent Labs     07/15/20  1235   TP 7.7   ALB 2.8*   *      Thyroid Studies No results found for: T4, T3U, TSH, TSHEXT      EKG: sinus     Physical Assessment:     Visit Vitals  /60   Pulse 96   Temp 97.5 °F (36.4 °C)   Resp 16   Ht 5' 2\" (1.575 m)   Wt 101.9 kg (224 lb 10.4 oz)   SpO2 100%   Breastfeeding No   BMI 41.09 kg/m²     Weight change:     Intake/Output Summary (Last 24 hours) at 7/16/2020 1636  Last data filed at 7/16/2020 0238  Gross per 24 hour   Intake 490 ml   Output 0 ml   Net 490 ml     Physical Exam:   General: comfortable, no acute distress   HEENT sclera anicteric, supple neck, no thyromegaly  CVS: S1S2 heard,  no rub  RS: + air entry b/l,   Abd: Soft, Non tender,   Neuro: non focal, awake, alert , CN II-XII are grossly intact  Extrm: ++edema, no cyanosis, clubbing   Skin: no visible  Rash  Musculoskeletal: No gross joints or bone deformities     Procedures/imaging: see electronic medical records for all procedures, Xrays and details which were not copied into this note but were reviewed prior to creation of Plan      Discussed with primary team.       Flor Cody MD  July 16, 2020  [facility]  Office [phone]

## 2020-07-16 NOTE — PROGRESS NOTES
Just spoke with the pt. She is concerned with the amount of pressure that she feels when she is urinating as well as making a bowel movement. She denied needing pain medication but asked me to write a note so that the doctor could follow up on the issue.

## 2020-07-16 NOTE — PROGRESS NOTES
Physical Therapy Goals  Initiated 7/16/2020 and to be accomplished within 7 day(s)  1. Patient will participate in functional maintenance walking program 3-5/days per week in order to maintain mobility. PLOF: Patient was independent with self care and mobility with cane/RW. [facility] PHYSICAL THERAPY EVALUATION     Patient: Michelle Lizarraga [de-identified]66 y.o. female)  Date: 7/16/2020  Primary Diagnosis: Complicated UTI (urinary tract infection) [N39.0]        Precautions:   Fall  PLOF: Patient reports she was independent with self care and uses cane/walker for mobility. ASSESSMENT :  Patient received sitting up in recliner agreeable to PT evaluation. Patient is pleasant and cooperative. She performs transfers and ambulates in the hallway using RW at supervision level. Based on the objective data described below, the patient is close to her baseline level of mobility. Patient would benefit from walking functional maintenance program to maintain independence and mobility. PLAN :  Recommendations and Planned Interventions:   Patient will be followed by rehab tech 3-5 times a week to address goals, weekly by PT.   Discharge Recommendations: Home Health  Further Equipment Recommendations for Discharge: shower chair and rolling walker     SUBJECTIVE:   Patient stated Im from [facility].    OBJECTIVE DATA SUMMARY:     Past Medical History:   Diagnosis Date    Acidosis     Anemia     [facility] ([facility] [address]     CKD (chronic kidney disease)     Diabetes ([facility] [address]     HLD (hyperlipidemia)     HTN (hypertension)     Hyperparathyroidism due to renal insufficiency ([facility] [address]     Hypothyroid     Kidney stone     Lung mass     Recurrent UTI     Ureter, stricture     Uric acid nephrolithiasis     Urinary incontinence      Past Surgical History:   Procedure Laterality Date    HX APPENDECTOMY      HX CHOLECYSTECTOMY      HX GASTRIC BYPASS      HX KNEE ARTHROSCOPY      HX UROLOGICAL      right PCN placement    HX UROLOGICAL  07/23/2018    RIGHT URETEROSCOPY WITH HOLMIUM LASER    IR EXCHANGE NEPHRO PERC LT SI  2/21/2020    IR EXCHANGE NEPHRO PERC RT SI  4/13/2020    IR NEPHROURETERAL PERC RT PLC CATH NEW ACCESS  SI  4/30/2020     Barriers to Learning/Limitations: None  Compensate with: N/A  Home Situation:   Home Situation  Home Environment: Private residence  # Steps to Enter: 3  Rails to Enter: Yes  One/Two Story Residence: Two story(has chair lift)  Lift Chair Available: Yes  Living Alone: No  Support Systems: Family member(s), Child(isaura)  Patient Expects to be Discharged to[de-identified] Private residence  Current DME Used/Available at Home: Commode, bedside, Milvia Humphrey, straight, Walker, rolling, Walker, rollator  Tub or Shower Type: (basin bath)  Critical Behavior:  Neurologic State: Alert  Orientation Level: Oriented to person;Oriented to place;Oriented to situation  Cognition: Follows commands  Safety/Judgement: Fall prevention  Psychosocial  Patient Behaviors: Calm; Cooperative  Purposeful Interaction: Yes  Pt Identified Daily Priority: Clinical issues (comment)  Marandas Process: Nurture loving kindness;Establish trust;Teaching/learning; Attend basic human needs  Caring Interventions: Therapeutic modalities     Strength:    Strength: Generally decreased, functional     Tone & Sensation:   Tone: Normal  Sensation: Intact(BLE)     Range Of Motion:  AROM: Within functional limits    Functional Mobility:  Bed Mobility:  Rolling: Supervision  Supine to Sit: Supervision     Scooting: Supervision  Transfers:  Sit to Stand: Supervision  Stand to Sit: Supervision    Balance:   Sitting: Intact  Standing: Impaired; With support  Standing - Static: Good  Standing - Dynamic : Fair(+)    Ambulation/Gait Training:  Distance (ft): 100 Feet (ft)  Assistive Device: Walker, rolling  Ambulation - Level of Assistance: Supervision  Speed/Marylou: Slow  Step Length: Right shortened;Left shortened       Pain:  Pain level pre-treatment: 0/10   Pain level post-treatment: 0/10  Pain Intervention(s): Medication (see MAR); Rest, Ice, Repositioning   Response to intervention: Nurse notified, See doc flow    Activity Tolerance:   Good  Please refer to the flowsheet for vital signs taken during this treatment. After treatment:   [x]         Patient left in no apparent distress sitting up in chair  []         Patient left in no apparent distress in bed  [x]         Call bell left within reach  []         Nursing notified  []         Caregiver present  []         Bed alarm activated  []         SCDs applied    COMMUNICATION/EDUCATION:   [x]         Role of Physical Therapy in the acute care setting. [x]         Fall prevention education was provided and the patient/caregiver indicated understanding. []         Patient/family have participated as able in goal setting and plan of care. []         Patient/family agree to work toward stated goals and plan of care. []         Patient understands intent and goals of therapy, but is neutral about his/her participation. []         Patient is unable to participate in goal setting/plan of care: ongoing with therapy staff.  []         Other:     Thank you for this referral.  Geovany Heller, PT   Time Calculation: 18 mins      Eval Complexity: History: MEDIUM  Complexity : 1-2 comorbidities / personal factors will impact the outcome/ POC Exam:LOW Complexity : 1-2 Standardized tests and measures addressing body structure, function, activity limitation and / or participation in recreation  Presentation: LOW Complexity : Stable, uncomplicated  Clinical Decision Making:Low Complexity    Overall Complexity:LOW

## 2020-07-16 NOTE — PROGRESS NOTES
PT order received and chart reviewed. Patient is SHANON with vascular at 1017. Will follow up as schedule permits.      Thank you for this referral.  Barbie Guevara PT, DPT

## 2020-07-16 NOTE — PROGRESS NOTES
SUBJECTIVE:   Called to bedside by RN because of low BPs. Upon arrival, patient was resting comfortably in bed. She complained of headache and racing heart. Denied dizziness, blurry vision, abdominal pain, N/V.    BP has been obtained using a small cuff on right forearm because she has IV in upper right arm and fistula in left arm. OBJECTIVE:    Patient Vitals for the past 8 hrs:   Temp Pulse Resp BP SpO2   07/16/20 0006 -- 91 13 (!) 94/35 96 %   07/16/20 0000 -- -- -- (!) 87/60 --   07/15/20 2345 -- 90 16 -- 96 %   07/15/20 2339 98 °F (36.7 °C) 96 18 96/80 --   07/15/20 2330 -- 89 16 -- 99 %   07/15/20 2315 -- 93 19 -- 97 %   07/15/20 2310 -- 97 18 -- 92 %   07/15/20 2309 -- 95 15 108/75 --   07/15/20 2300 -- 93 22 (!) 73/40 96 %   07/15/20 2245 -- 92 19 -- 92 %   07/15/20 2230 -- (!) 126 26 -- (!) 82 %   07/15/20 2215 -- (!) 109 17 -- --   07/15/20 2200 -- 95 20 (!) 72/40 (!) 79 %   07/15/20 2145 -- 98 16 -- 90 %   07/15/20 2130 -- (!) 108 22 -- 97 %   07/15/20 2100 -- 96 13 94/78 --   07/15/20 1937 98.2 °F (36.8 °C) -- -- -- --   07/15/20 1930 -- 88 14 -- 93 %   07/15/20 1904 98.8 °F (37.1 °C) 95 23 101/82 94 %   07/15/20 1900 -- 89 24 101/82 93 %         ASSESSMENT & PLAN:  1. Hypotension in setting of Complicated UTI: Patient looks well despite low BPs. May not have most accurate BPs given location of cuff. Concerning because of mild tachycardia with hypotension. BP did increase to 103/58 when rechecked at bedside. Reviewed medications that have BP lowering effects. Patient is on Flomax, but will continue at this time given urinary issues. Can discuss with Urology. - Will continue to monitor very closely   - Will give 500 cc bolus NS if low BPs continue; cautious with fluids given ESRD   - Will consider Midodrine 10mg TID if fluids does not help      Angie Beckham MD, PGY-3  [address] Medicine  07/16/20 12:39 AM

## 2020-07-16 NOTE — PROGRESS NOTES
Problem: Patient Education: Go to Patient Education Activity  Goal: Patient/Family Education  Outcome: Progressing Towards Goal     Problem: Falls - Risk of  Goal: *Absence of Falls  Description: Document Roe Stade Fall Risk and appropriate interventions in the flowsheet. Note: Fall Risk Interventions:  Mobility Interventions: Communicate number of staff needed for ambulation/transfer, Patient to call before getting OOB, Utilize walker, cane, or other assistive device         Medication Interventions: Assess postural VS orthostatic hypotension, Patient to call before getting OOB, Teach patient to arise slowly                   Problem: Pressure Injury - Risk of  Goal: *Prevention of pressure injury  Description: Document Giovany Scale and appropriate interventions in the flowsheet. Note: Pressure Injury Interventions:  Sensory Interventions: Assess changes in LOC, Check visual cues for pain, Keep linens dry and wrinkle-free, Maintain/enhance activity level, Minimize linen layers    Moisture Interventions: Offer toileting Q_hr, Minimize layers, Internal/External urinary devices, Check for incontinence Q2 hours and as needed    Activity Interventions: Increase time out of bed, Pressure redistribution bed/mattress(bed type)    Mobility Interventions: HOB 30 degrees or less, Pressure redistribution bed/mattress (bed type)         Friction and Shear Interventions: HOB 30 degrees or less, Minimize layers                Problem: Pressure Injury - Risk of  Goal: *Prevention of pressure injury  Description: Document Giovany Scale and appropriate interventions in the flowsheet.   Note: Pressure Injury Interventions:  Sensory Interventions: Assess changes in LOC, Check visual cues for pain, Keep linens dry and wrinkle-free, Maintain/enhance activity level, Minimize linen layers    Moisture Interventions: Offer toileting Q_hr, Minimize layers, Internal/External urinary devices, Check for incontinence Q2 hours and as needed    Activity Interventions: Increase time out of bed, Pressure redistribution bed/mattress(bed type)    Mobility Interventions: HOB 30 degrees or less, Pressure redistribution bed/mattress (bed type)         Friction and Shear Interventions: HOB 30 degrees or less, Minimize layers

## 2020-07-16 NOTE — ROUTINE PROCESS
Bedside shift change report given to paula spears (oncoming nurse) by Devon Lanza (offgoing nurse). Report included the following information SBAR.

## 2020-07-16 NOTE — PROGRESS NOTES
Reason for Admission:   Complicated UTI (urinary tract infection) [N39.0]               RUR Score:     32%             Resources/supports as identified by patient/family:       Top Challenges facing patient (as identified by patient/family and CM): Finances/Medication cost?       Transportation      Son transports her  Support system or lack thereof? Very supportive son and DIL  Living arrangements? Lives in a house with her son and daughter in law   Self-care/ADLs/Cognition? Aaox4, normally independent with self care        Current Advanced Directive/Advance Care Plan:   yes                          Plan for utilizing home health:    yes, Freedom of Choice signed for [facility]                      Likelihood of readmission:   HIGH    Transition of Care Plan:   Return home                 Initial assessment completed with patient. Cognitive status of patient: oriented to time, place, person and situation. Face sheet information confirmed:  yes. The patient designates son, Sammie Quiñones to participate in her discharge plan and to receive any needed information. This patient lives in a single family home with son. Patient is able to navigate steps as needed. Prior to hospitalization, patient was considered to be independent with ADLs/IADLS : yes . Patient has a current ACP document on file: yes  The son will be available to transport patient home upon discharge. The patient already has [facility] Reichmann, [facility], and [facility] available in the home. Patient is not currently active with home health. Patient has not stayed in a skilled nursing facility or rehab. This patient is on dialysis :yes    If yes, hemodialysis patient and receives treatment on Monday/Wednesday/Friday at [facility] [phone]  Chair time is 10am. Pt is transported to/from dialysis by son. List of available Home Health agencies were provided and reviewed with the patient prior to discharge. Freedom of choice signed: yes, for [address] Currently, the discharge plan is Home with [facility]. The patient states that she can obtain her medications from the pharmacy, and take her medications as directed. Patient's current insurance is [employer]. .      Care Management Interventions  PCP Verified by CM: Yes  Mode of Transport at Discharge: Self  Transition of Care Consult (CM Consult): Discharge Planning, [address]: Yes  Current Support Network:  Other(lives with son and daughter in law)  Confirm Follow Up Transport: Family  The Patient and/or Patient Representative was Provided with a Choice of Provider and Agrees with the Discharge Plan?: Yes  Freedom of Choice List was Provided with Basic Dialogue that Supports the Patient's Individualized Plan of Care/Goals, Treatment Preferences and Shares the Quality Data Associated with the Providers?: Yes  Discharge Location  Discharge Placement: Home with home health        Christopher Warner RN - Outcomes Manager  [phone]

## 2020-07-16 NOTE — CONSULTS
ICD-10-CM ICD-9-CM    1. Complicated UTI (urinary tract infection)  N39.0 599.0    2. ESRD (end stage renal disease) on dialysis ([facility] [address]  N18.6 585.6     Z99.2 V45.11           ASSESSMENT:   Right distal ureteral stricture managed with PCNT since 2018   -Originally evaluated in [facility])   -Last PCNUT exchange on 4/30/2020    ESRD- newly on dialysis    UTI         PLAN:    Consulted IR for right percutaneous nephroureteral stent exchange. If possible please collect and send culture from new tube. She will need nephrostomy tube or stent as long as she is making some urine. I do not recommend removing unless can get sterile urine and becomes oliguric. Place current nephrostomy tube to gravity drainage. May benefit from PCNU left to gravity to minimize urine in the bladder. Follow up arranged? Kristopher Camilo MD    [phone]    July 16, 2020 10:48 AM        Chief Complaint   Patient presents with    Urinary Pain       HISTORY OF PRESENT ILLNESS:  Tierra Harris is a 66 y.o. female who is seen in consultation as referred by Dr. Aracelis Magdaleno  for right ureteral stricture and UTI. Patient has been seen by a urologist and is currently receiving urological care with Dr. Jose Wood MD in [place] [address]. Urological history is significant for Recurrent UTIs, Right hydronephrosis; right distal ureteral stricture dilation 7/23/18 (Dr. Patricio Bills) with rapid recurrence of right hydronephrosis following stent removal. Nephrostomy tube placed 9/4/18 at [facility]. Under the Infectious Diseases care of Dr. Vashti Daley. Urinary Incontinence. HPI: Patient originally presented to the ER on 2/20/2020 with c/o suprapubic pain and burning sensation x 2-3 months. She was seen by her PCP earlier that day and was sent to ER for IV abx due to failed outpt tx for pyelo and possible dialysis due to worsening renal function.  CT was performed and showed Right kidney shows no dilation of the collecting system with nephrostomy in place. Bilateral renal cysts. Patient reports baseline urinary symptoms including: Incontinence, straining to urinate, having the urge to urinate however unable to go when sit on toilet and then will be incontinent later on. She had started on dialysis approx 2 weeks ago. Prior to dialysis she was reporting increasing weakness and fatigue. She is not able to stand to do her dishes. She has had dark urine and incontinence. No fevers. Past Medical History:   Diagnosis Date    Acidosis     Anemia     Arteriovenous fistula (HCC)     CKD (chronic kidney disease)     Diabetes (HCC)     HLD (hyperlipidemia)     HTN (hypertension)     Hyperparathyroidism due to renal insufficiency (HCC)     Hypothyroid     Kidney stone     Lung mass     Recurrent UTI     Ureter, stricture     Uric acid nephrolithiasis     Urinary incontinence        Past Surgical History:   Procedure Laterality Date    HX APPENDECTOMY      HX CHOLECYSTECTOMY      HX GASTRIC BYPASS      HX KNEE ARTHROSCOPY      HX UROLOGICAL      right PCN placement    HX UROLOGICAL  07/23/2018    RIGHT URETEROSCOPY WITH HOLMIUM LASER    IR EXCHANGE NEPHRO PERC LT SI  2/21/2020    IR EXCHANGE NEPHRO PERC RT SI  4/13/2020    IR NEPHROURETERAL PERC RT PLC CATH NEW ACCESS  SI  4/30/2020       Social History     Tobacco Use    Smoking status: Never Smoker    Smokeless tobacco: Never Used   Substance Use Topics    Alcohol use: Never     Frequency: Never    Drug use: Never       Allergies   Allergen Reactions    Ciprofloxacin Hives    Statins-Hmg-Coa Reductase Inhibitors Other (comments)     Body ache       History reviewed. No pertinent family history.     Current Facility-Administered Medications   Medication Dose Route Frequency Provider Last Rate Last Dose    acetaminophen (TYLENOL) tablet 650 mg  650 mg Oral ONCE Marissa Reeder MD        acetaminophen (TYLENOL) tablet 650 mg  650 mg Oral BID James Thomas MD   650 mg at 07/16/20 8398    ascorbic acid (vitamin C) (VITAMIN C) tablet 500 mg  500 mg Oral DAILY James Thomas MD   500 mg at 07/16/20 1678    calcitRIOL (ROCALTROL) capsule 0.25 mcg  0.25 mcg Oral DAILY James Thomas MD   0.25 mcg at 07/16/20 7821    cholecalciferol (VITAMIN D3) (1000 Units /25 mcg) tablet 2 Tab  2,000 Units Oral DAILY James Thomas MD   2 Tab at 07/16/20 6728    cyanocobalamin tablet 1,000 mcg  1,000 mcg Oral DAILY James Thomas MD   1,000 mcg at 07/16/20 0845    gabapentin (NEURONTIN) capsule 100 mg  100 mg Oral QHS James Thomas MD   100 mg at 07/15/20 2200    latanoprost (XALATAN) 0.005 % ophthalmic solution 1 Drop  1 Drop Both Eyes QHS James Thomas MD   Stopped at 07/15/20 2200    pantoprazole (PROTONIX) tablet 40 mg  40 mg Oral DAILY James Thomas MD   40 mg at 07/16/20 0844    ondansetron (ZOFRAN ODT) tablet 4 mg  4 mg Oral Q8H PRN James Thomas MD        sodium bicarbonate tablet 1,300 mg  1,300 mg Oral TID James Thomas MD   1,300 mg at 07/16/20 0844    tamsulosin (FLOMAX) capsule 0.4 mg  0.4 mg Oral DAILY James Thomas MD   0.4 mg at 07/16/20 0844    B complex-vitaminC-folic acid (NEPHROCAP) cap  1 Cap Oral DAILY James Thomas MD   1 Cap at 07/16/20 0844    heparin (porcine) injection 5,000 Units  5,000 Units SubCUTAneous Q8H Ericka Murray MD   Stopped at 07/16/20 0300    levothyroxine (SYNTHROID) tablet 125 mcg  125 mcg Oral River Haines MD   125 mcg at 07/16/20 1153    lactobacillus sp. 50 billion cpu (BIO-K PLUS) capsule 1 Cap  1 Cap Oral DAILY Kiya Villarreal MD        allopurinoL (ZYLOPRIM) tablet 50 mg  50 mg Oral DAILY Kiya Villarreal MD   50 mg at 07/16/20 0844    cefepime (MAXIPIME) 1 g in sterile water (preservative free) 10 mL IV syringe  1 g IntraVENous Q24H Susana Orellaan MD   1 g at 07/15/20 1907         Review of Systems:  ROS is:      Negative for: Ophthalmologic issues, ENT issues, Cardiovascular issues, respiratory issues, GI issues, neurologic issues, hematoogic issues, skin lesions,  psychiatric issues    Positive for:    Fatigue, weakness, incontinence          PHYSICAL EXAMINATION:     Visit Vitals  /58   Pulse (!) 112   Temp 98 °F (36.7 °C)   Resp 17   Ht 5' 2\" (1.575 m)   Wt 224 lb 10.4 oz (101.9 kg)   SpO2 96%   Breastfeeding No   BMI 41.09 kg/m²     Constitutional: Well developed, well-nourished female in no acute distress. HEENT: Normocephalic, Atraumatic   CV:   no edema   Respiratory: No respiratory distress or difficulties breathing   Abdomen:  Soft and nontender.  Female:  CVA tenderness: mild on right  PCNU tube present- skin is clean. Tube is capped. Skin:  Normal color. No evidence of jaundice. Neuro/Psych:  Patient with appropriate affect. Alert and oriented.         REVIEW OF LABS AND IMAGING:         Labs: Results:   Chemistry    Recent Labs     07/16/20  0515 07/15/20  1235   * 203*    139   K 3.4* 3.6    101   CO2 30 27   BUN 47* 41*   CREA 6.23* 6.02*   CA 8.2* 8.6   AGAP 9 11   BUCR 8* 7*   AP  --  181*   TP  --  7.7   ALB  --  2.8*   GLOB  --  4.9*   AGRAT  --  0.6*      CBC w/Diff Recent Labs     07/16/20  0515 07/15/20  1235   WBC 8.1 10.0   RBC 2.38* 2.57*   HGB 7.4* 7.9*   HCT 23.4* 25.0*    332   GRANS 63 69   LYMPH 25 20*   EOS 1 1      Cultures Recent Labs     07/15/20  1845 07/15/20  1830   CULT NO GROWTH AFTER 11 HOURS NO GROWTH AFTER 11 HOURS     All Micro Results     Procedure Component Value Units Date/Time    CULTURE, BLOOD [[id number]] Collected:  07/15/20 1830    Order Status:  Completed Specimen:  Blood Updated:  07/16/20 0737     Special Requests: NO SPECIAL REQUESTS        Culture result: NO GROWTH AFTER 11 HOURS       CULTURE, BLOOD [[id number]] Collected:  07/15/20 1845    Order Status:  Completed Specimen:  Blood Updated:  07/16/20 0737     Special Requests: NO SPECIAL REQUESTS        Culture result: NO GROWTH AFTER 11 HOURS       CULTURE, MRSA [[id number]]     Order Status:  Sent Specimen:  Nasal from Nares     CULTURE, URINE [[id number]] Collected:  07/15/20 1220    Order Status:  Completed Specimen:  Clean catch Updated:  07/16/20 0002            Urinalysis Color   Date Value Ref Range Status   07/15/2020 YELLOW   Final     Appearance   Date Value Ref Range Status   07/15/2020 OPAQUE   Final     Specific gravity   Date Value Ref Range Status   07/15/2020 1.025 1.003 - 1.030   Final     pH (UA)   Date Value Ref Range Status   07/15/2020 6.5 5.0 - 8.0   Final     Protein   Date Value Ref Range Status   07/15/2020 >300 (A) NEG mg/dL Final     Ketone   Date Value Ref Range Status   07/15/2020 Negative NEG mg/dL Final     Bilirubin   Date Value Ref Range Status   07/15/2020 Negative NEG   Final     Blood   Date Value Ref Range Status   07/15/2020 MODERATE (A) NEG   Final     Urobilinogen   Date Value Ref Range Status   07/15/2020 0.2 0.2 - 1.0 EU/dL Final     Nitrites   Date Value Ref Range Status   07/15/2020 Negative NEG   Final     Leukocyte Esterase   Date Value Ref Range Status   07/15/2020 LARGE (A) NEG   Final     Potassium   Date Value Ref Range Status   07/16/2020 3.4 (L) 3.5 - 5.5 mmol/L Final     Creatinine   Date Value Ref Range Status   07/16/2020 6.23 (H) 0.6 - 1.3 MG/DL Final     BUN   Date Value Ref Range Status   07/16/2020 47 (H) 7.0 - 18 MG/DL Final      Coagulation Lab Results   Component Value Date/Time    Prothrombin time 15.5 (H) 04/30/2020 09:30 AM    Prothrombin time 14.9 04/13/2020 10:04 AM    INR 1.3 (H) 04/30/2020 09:30 AM    INR 1.2 04/13/2020 10:04 AM    aPTT 34.6 04/30/2020 09:30 AM    aPTT 33.9 04/13/2020 10:04 AM

## 2020-07-16 NOTE — ROUTINE PROCESS
Bedside and Verbal shift change report given to Linda Morocho (oncoming nurse) by Dorothy Fernandez (offgoing nurse). Report included the following information SBAR, Kardex, Procedure Summary, Intake/Output, MAR and Recent Results.

## 2020-07-16 NOTE — ROUTINE PROCESS
TRANSFER - IN REPORT:    Verbal report received from Isabella rn(name) on Darion Stern  being received from ED(unit) for routine post - op      Report consisted of patients Situation, Background, Assessment and   Recommendations(SBAR). Information from the following report(s) SBAR was reviewed with the receiving nurse. Opportunity for questions and clarification was provided. Assessment completed upon patients arrival to unit and care assumed.

## 2020-07-16 NOTE — MED STUDENT NOTES
*ATTENTION:  This note has been created by a medical student for educational purposes only. Please do not refer to the content of this note for clinical decision-making, billing, or other purposes. Please see attending physicians note to obtain clinical information on this patient. *       .         **MEDICAL STUDENT NOTE. FOR EDUCATIONAL PURPOSES**    Progress Note    Patient: Yahaira Milan MRN: [id number]  SSN: [id number]    YOB: 1945  Age: 66 y.o. Sex: female      Admit Date: 7/15/2020    LOS: 1 day     Subjective:     Pt noted to have low blood pressures overnight to 87/60. Pt denies any new or worsening symptoms. Pt denies any CP, SOB, nausea, or vomiting. Objective:     Vitals:    07/16/20 0216 07/16/20 0405 07/16/20 0741 07/16/20 0800   BP: 121/70 110/59 108/58    Pulse: (!) 101 95 93 (!) 112   Resp: 20 17 17    Temp: 97.7 °F (36.5 °C) 97.1 °F (36.2 °C) 98 °F (36.7 °C)    SpO2: 98% 99% 96%    Weight:       Height:            Intake and Output:  Current Shift: No intake/output data recorded. Last three shifts: 07/14 1901 - 07/16 0700  In: 56 [P.O.:480; I.V.:10]  Out: 0     Physical Exam:   GENERAL: alert, cooperative, no distress, appears stated age  LUNG: clear to auscultation bilaterally  HEART: regular rate and rhythm, S1, S2 normal, no murmur, click, rub or gallop  ABDOMEN: soft, non-tender. Bowel sounds normal. No masses,  no organomegaly  SKIN: bandage over lower back overlying nephrostomy tube    Lab/Data Review: All lab results for the last 24 hours reviewed. Assessment/Plan     66 y.o. female with PMH of CKD5, hypothyroidism, GERD, DM2, HTN, HLD, recurrent nephrolithiasis, anemia, who is admitted for complicated UTI. Currently afebrile and tolerating treatment.     Complicated UTI w/Hx of recurrent UTI/nephrolithiasis s/p nephrostomy tube  - On cefepime 1g  - FU Urine and Blood Cx  - ID and Urology consulted  - Continue probiotics, allopurinol, and flowmax  - Daily CBC/BMP    CKD5, dialysis twice weekly  - Nephrology consulted  - Vascular following with regards to if she needs fistulogram  - Trending Cr, daily BMP     Anemia of Chronic Disease. Hgb downtrending   - Continue to monitor, daily CBC    Chronic Metabolic Acidosis   - On sodium bicarb 1300mg three times a day    HTN  - Continue to monitor, currently not on meds  - Low blood BP overnight, will consider fluids or Midodrine if continue to remain low    DM2 w/ neuropathy. Controlled  - Continue gabapentin    GERD. Controlled  - On pantoprazole    Hypothyroidism. Asymptomatic  - On levothyroxine    Glaucoma.  Controlled  - On latanoprost      Signed By: Jakob Hubbard     July 16, 2020

## 2020-07-16 NOTE — PROGRESS NOTES
[facility]  Progress Note    Patient: Brice Fothergill MRN: [id number]   SSN: [id number]  YOB: 1945   Age: 66 y.o. Sex: female      Admit Date: 7/15/2020    LOS: 1 day   Chief Complaint   Patient presents with    Urinary Pain       Subjective:     Patient states that she is feeling overall well today. She did have a few low pressures yesterday, but did not feel symptomatic per the patient. She states she was not dizzy, lightheaded, or feel as though she might faint, and was able to get up with assist to the [facility] and back without issue. She was seen this morning comfortably laying in her bed, and was well with no issues or concerns. Review of Systems   Constitutional: Negative for chills, fever and malaise/fatigue. Respiratory: Negative for cough, shortness of breath and wheezing. Cardiovascular: Negative for chest pain and palpitations. Gastrointestinal: Negative for abdominal pain. Genitourinary: Positive for frequency and urgency. Incontinence   Neurological: Positive for weakness and headaches. Negative for dizziness and loss of consciousness. Objective:     Visit Vitals  /58   Pulse (!) 112   Temp 98 °F (36.7 °C)   Resp 17   Ht 5' 2\" (1.575 m)   Wt 101.9 kg (224 lb 10.4 oz)   SpO2 96%   Breastfeeding No   BMI 41.09 kg/m²       Physical Exam:   Con: NAD, well developed, obese, laying in bed comfortably. CV: RRR, normal S1, S2, no murmurs  Resp: CTAB  Abd: No pain on palpation, not tender, not distended, soft. Back: nephrostomy tube bandaged and clean. Intake and Output:  Current Shift: No intake/output data recorded.   Last three shifts: 07/14 1901 - 07/16 0700  In: 490 [P.O.:480; I.V.:10]  Out: 0     Lab/Data Review:  Recent Results (from the past 12 hour(s))   METABOLIC PANEL, BASIC    Collection Time: 07/16/20  5:15 AM   Result Value Ref Range    Sodium 140 136 - 145 mmol/L    Potassium 3.4 (L) 3.5 - 5.5 mmol/L    Chloride 101 100 - 111 mmol/L CO2 30 21 - 32 mmol/L    Anion gap 9 3.0 - 18 mmol/L    Glucose 133 (H) 74 - 99 mg/dL    BUN 47 (H) 7.0 - 18 MG/DL    Creatinine 6.23 (H) 0.6 - 1.3 MG/DL    BUN/Creatinine ratio 8 (L) 12 - 20      GFR est AA 8 (L) >60 ml/min/1.73m2    GFR est non-AA 7 (L) >60 ml/min/1.73m2    Calcium 8.2 (L) 8.5 - 10.1 MG/DL   CBC WITH AUTOMATED DIFF    Collection Time: 07/16/20  5:15 AM   Result Value Ref Range    WBC 8.1 4.6 - 13.2 K/uL    RBC 2.38 (L) 4.20 - 5.30 M/uL    HGB 7.4 (L) 12.0 - 16.0 g/dL    HCT 23.4 (L) 35.0 - 45.0 %    MCV 98.3 (H) 74.0 - 97.0 FL    MCH 31.1 24.0 - 34.0 PG    MCHC 31.6 31.0 - 37.0 g/dL    RDW 14.5 11.6 - 14.5 %    PLATELET 899 376 - 873 K/uL    MPV 9.8 9.2 - 11.8 FL    NEUTROPHILS 63 40 - 73 %    LYMPHOCYTES 25 21 - 52 %    MONOCYTES 11 (H) 3 - 10 %    EOSINOPHILS 1 0 - 5 %    BASOPHILS 0 0 - 2 %    ABS. NEUTROPHILS 5.1 1.8 - 8.0 K/UL    ABS. LYMPHOCYTES 2.0 0.9 - 3.6 K/UL    ABS. MONOCYTES 0.9 0.05 - 1.2 K/UL    ABS. EOSINOPHILS 0.1 0.0 - 0.4 K/UL    ABS. BASOPHILS 0.0 0.0 - 0.1 K/UL    DF AUTOMATED     MAGNESIUM    Collection Time: 07/16/20  5:15 AM   Result Value Ref Range    Magnesium 1.8 1.6 - 2.6 mg/dL   PHOSPHORUS    Collection Time: 07/16/20  5:15 AM   Result Value Ref Range    Phosphorus 4.7 2.5 - 4.9 MG/DL   GLUCOSE, POC    Collection Time: 07/16/20  8:03 AM   Result Value Ref Range    Glucose (POC) 111 (H) 70 - 110 mg/dL       RECENT RESULTS  MODALITY IMPRESSION   XR Results from Hospital Encounter encounter on 06/23/20   XR CHEST PA LAT    Narrative CHEST PA AND LATERAL:    INDICATIONS: Renal failure beginning dialysis. COMPARISONS: May 15, 2020. FINDINGS:     Two views are obtained . Lungs: Clear. Cardiac Silhouette And Mediastinal Contours: Normal.    Pleural Spaces: No pneumothorax or pleural effusion evident. Bones And Soft Tissues: Unremarkable for age. Impression IMPRESSION:    No acute or active disease evident.         CT Results from [facility] encounter on 07/15/20   CT ABD PELV WO CONT    Narrative CT ABD PELV WO CONT    HISTORY: Right nephroureterostomy. Dysuria. COMPARISON: Noncontrast abdomen pelvic CT 5/15/2020, 2/20/2020. Tony Hind PROCEDURE: Noncontrast renal stone CT was performed. Iterative techniques for  dose savings. Cor/Sag reconstructions performed. FINDINGS:     LUNG BASES: clear without pleural or pericardial effusion; 2 x 5 mm right  Fissural nodule. Moderate coronary calcium burden. Elevated eventrated right  hemidiaphragm. LIVER: unopacified shows no mass or intrahepatic ductal dilation;    PANCREAS: normal appearance without mass, adenopathy, or peripancreatic free  fluid;    SPLEEN: normal appearance without enlargement, perisplenic collaterals, or  adenopathy; splenule. GALLBLADDER: Surgically absent;    ADRENALS: normal size and shape, no calcifications;    KIDNEYS: Lobulated atrophic left kidney, with multifocal cortical cysts, overall  unchanged. No hydronephrosis or stones. Right kidney shows nephroureterostomy. There is a loop formed in the right renal  pelvis. The right kidney does show some punctate foci of air in cortical cyst.  There is no perinephric fat stranding or stone. The right ureteral branches  normally located and coils in the bladder.;    AORTA: Normal size without significant plaque or periaortic fluid;    RETROPERITONEUM: No adenopathy or fat stranding;    BOWEL/MESENTERY: Normal size and distribution, no distention, misting,  adenopathy, or hernia; gastric bypass changes. Sigmoid diverticulosis. APPENDIX: is not inflamed;    PELVIS: No ascites, adenopathy, hernia, or focal inflammatory process; Urinary  Bladder is unremarkable except for air in the nondependent portion. Uterus is  absent. .    OSSEOUS: Multifocal degenerative changes throughout the thoracolumbar spine. No  definite compression injury. ;      Impression IMPRESSION:     1. Stable right nephroureterostomy without increasing hydronephrosis on the  stone. .  2. No secondary evidence of mass, bowel obstruction, ascites, hernia, or focal  inflammatory process. 3. Bilateral renal cystic disease. .  4. Sigmoid diverticulosis without diverticulitis. MRI No results found for this or any previous visit. ULTRASOUND No results found for this or any previous visit. Cardiology Procedures/Testing:  MODALITY RESULTS   EKG Results for orders placed or performed during the hospital encounter of 05/15/20   EKG, 12 LEAD, INITIAL   Result Value Ref Range    Ventricular Rate 72 BPM    Atrial Rate 36 BPM    QRS Duration 80 ms    Q-T Interval 378 ms    QTC Calculation (Bezet) 413 ms    Calculated R Axis -20 degrees    Diagnosis       Accelerated Junctional rhythm  Nonspecific ST abnormality  Abnormal ECG  When compared with ECG of 16-JAN-2020 16:20,  Nonspecific T wave abnormality, worse in Inferior leads  Confirmed by Tessy Prieto ([id number]) on 5/16/2020 1:41:51 PM         ECHO       Special Testing/Procedures:  MODALITY RESULTS   MICRO All Micro Results     Procedure Component Value Units Date/Time    CULTURE, BLOOD [[id number]] Collected:  07/15/20 1830    Order Status:  Completed Specimen:  Blood Updated:  07/16/20 0737     Special Requests: NO SPECIAL REQUESTS        Culture result: NO GROWTH AFTER 11 HOURS       CULTURE, BLOOD [[id number]] Collected:  07/15/20 1845    Order Status:  Completed Specimen:  Blood Updated:  07/16/20 0737     Special Requests: NO SPECIAL REQUESTS        Culture result: NO GROWTH AFTER 11 HOURS       CULTURE, MRSA [[id number]]     Order Status:  Sent Specimen:  Nasal from Nares     CULTURE, URINE [[id number]] Collected:  07/15/20 1220    Order Status:  Completed Specimen:  Clean catch Updated:  07/16/20 0002         UA No results found for this or any previous visit.    PATH None     Telemetry YES   Oxygen NONE     Assessment and Plan:   66 y.o. female with PMH CKD5, acquired hypothyroidism, GERD, T2DM, HTN, HLD,history of recurrent nephrolithiasis, now with a nephrostomy tube(6/20), Urinary incontinence,  controlled with diet, Anemia of chronic renal failure, now presenting with complaint of progressive weakness, difficulty standing, and pain in the right flank and back.     1. Complicated UTI vs pyelonephritis 2/2 nephrostomy tube/nephrolithiasis, with history of recurrent UTI. Patient presented with 3 days of weakness, Right sided flank pain, his a history of recurrent UTI, stones, and nephrostomy and stent for stone. Also Hx of strictures, severe hydronephrosis. No leucocytosis in ED, CT unimpressive for infection, tachycardic in the ED. Vitals stable otherwise. UA with too numerous WBC, Large Leucocyte esterase, (-) nitrites, moderate blood.                         -continue cefepime, appreciate ID recs              -FU Urine culture              -Urology consulted/IR consulted, for nephrostomy tube change              -continue home allopurinol, flomax, probiotics              -Daily bmp, cbc              -PT/OT     CKD5/ESRD  On dialysis twice weekly. Cr elevated 6.02 with baseline approx 3.5. Last dialysis 7/13              -Consult nephrology, appreciate their assistance              -Renal dose medication              -Trend Cr, K, BUN     Anemia/Anemia of chronic disease/kidney failure, 2/2 ESRD  Currently HGB 7.9, down from month ago at 9.2. Last iron study 5/8/2020: Iron 72, TIBC 263, Iron saturation 27, Ferritin 70. HgB 7.9 on admission, no signs of active bleeding.  7.4 This AM              -likely part of ESRD              -Monitor daily cbc              -Defer to nephrology for dialysis and related anemia injections and treatments              -consider blood transfusion if HGB <7.0     Chronic Metabolic Acidosis likely 2/2 to her ESRD  - Continue on home NaHCO2 650mg two tablets TID     Hx of HTN              -previously on amlodipine and coreg, but stopped due to low BP.               -monitor BP     Hx of T2DM, peripheral neuropathy              -Last HbA1c <3.5   -<3.8 this admit              -Taking Gabapentin, continue 100mg QHs              -Monitor via BMP, no need for accucheck at this time     GERD              -On omeprazole at home              -Protonix 40mg every day     Hypothyroidism              -Continue synthroid 125mcg, home dose     Glaucoma              -Continue home meds/eye drops    Diet Renal   DVT Prophylaxis SQH   GI Prophylaxis Protonix   Code status DNR. DNI   Disposition 1-2 nights     Point of Contact [employer]  Relationship: Son  [phone]     Mercedes Moy MD, PGY-1   [address]   Intern Pager: [phone]   July 16, 2020, 9:29 AM

## 2020-07-16 NOTE — PROGRESS NOTES
Pt called nursing staff into room. Patient sitting on the edge of the bed, bent over, stating that since we unclamped her nephrostomy tube and connected it back to the bag, she is having new intolerable pain. Dr. Marc Rosa to come see patient.

## 2020-07-16 NOTE — ED NOTES
Patient awake and alert states that she feels okay once she is lying down but states that her legs feel like cotton when she tries to get up.

## 2020-07-16 NOTE — CONSULTS
Surgery Consult      Patient: Yarely Cox MRN: [id number]  CSN: [id number]      YOB: 1945    Age: 66 y.o. Sex: female      DOA: 7/15/2020       HPI:     Yarely Cox is a 66 y.o. female who presents with generalized weakness over her entire body. She also noticed that she gets pain within her left upper extremity at dialysis. She does not have any symptoms of steal syndrome within the left upper extremity. She does state that she has lost about 40 pounds and does have some positional paresthesia within her left upper extremity but the second she ends up moving her upper extremity all of the paresthesia goes away. No current fevers or chills. She states she has full motion and sensation within her left upper extremity. Patient had the surgery done at an outside facility and was actually being followed by surgeons over in [place]. Patient is more than welcome to be followed by our group if she so desires. Past Medical History:   Diagnosis Date    Acidosis     Anemia     Arteriovenous fistula (HCC)     CKD (chronic kidney disease)     Diabetes (HCC)     HLD (hyperlipidemia)     HTN (hypertension)     Hyperparathyroidism due to renal insufficiency (HCC)     Hypothyroid     Kidney stone     Lung mass     Recurrent UTI     Ureter, stricture     Uric acid nephrolithiasis     Urinary incontinence        Past Surgical History:   Procedure Laterality Date    HX APPENDECTOMY      HX CHOLECYSTECTOMY      HX GASTRIC BYPASS      HX KNEE ARTHROSCOPY      HX UROLOGICAL      right PCN placement    HX UROLOGICAL  07/23/2018    RIGHT URETEROSCOPY WITH HOLMIUM LASER    IR EXCHANGE NEPHRO PERC LT SI  2/21/2020    IR EXCHANGE NEPHRO PERC RT SI  4/13/2020    IR NEPHROURETERAL PERC RT PLC CATH NEW ACCESS  SI  4/30/2020       History reviewed. No pertinent family history.     Social History     Socioeconomic History    Marital status: SINGLE     Spouse name: Not on file    Number of children: Not on file    Years of education: Not on file    Highest education level: Not on file   Tobacco Use    Smoking status: Never Smoker    Smokeless tobacco: Never Used   Substance and Sexual Activity    Alcohol use: Never     Frequency: Never    Drug use: Never       Prior to Admission medications    Medication Sig Start Date End Date Taking? Authorizing Provider   biotin 1,000 mcg chew Take 1 Tab by mouth daily. Provider, Historical   cyanocobalamin 1,000 mcg tablet Take 1,000 mcg by mouth daily. Provider, Historical   gabapentin (NEURONTIN) 100 mg capsule Take 100 mg by mouth nightly. Provider, Historical   lactobacillus sp. 50 billion cpu (BIO-K PLUS) 50 billion cell -375 mg cap capsule Take 1 Cap by mouth daily. 2/25/20   Dagmar Tran MD   tamsulosin (FLOMAX) 0.4 mg capsule Take 1 Cap by mouth daily. 2/25/20   Dagmar Tran MD   sodium bicarbonate 650 mg tablet Take 2 Tabs by mouth three (3) times daily. Indications: excess body acid 2/24/20   Dagmar Tran MD   acetaminophen (TYLENOL) 325 mg tablet Take 650 mg by mouth two (2) times a day. Lexus Hogan MD   allopurinoL (ZYLOPRIM) 100 mg tablet Take 200 mg by mouth daily. Lexus Hogan MD   ascorbic acid, vitamin C, (VITAMIN C) 500 mg tablet Take 500 mg by mouth daily. Lexus Hogan MD   calcitRIOL (ROCALTROL) 0.25 mcg capsule Take 0.25 mcg by mouth daily. Lexus Hogan MD   cholecalciferol (VITAMIN D3) (2,000 UNITS /50 MCG) cap capsule Take 2,000 Units by mouth two (2) times a day. Take two tabs a total of 4000 units    Lexus Hogan MD   latanoprost (XALATAN) 0.005 % ophthalmic solution Administer 1 Drop to both eyes nightly. One drop at bedtime    Lexus Hogan MD   levothyroxine (SYNTHROID) 125 mcg tablet Take 125 mcg by mouth Daily (before breakfast). Lexus Hogan MD   omeprazole (PRILOSEC) 20 mg capsule Take 20 mg by mouth daily.     Lexus Hogan MD   ondansetron (ZOFRAN ODT) 4 mg disintegrating tablet Take 4 mg by mouth every eight (8) hours as needed for Nausea or Vomiting. Other, MD Lexus   vit B Cmplx 3-FA-Vit C-Biotin (NEPHRO HOWIE RX) 1- mg-mg-mcg tablet Take 1 Tab by mouth daily. Other, MD Lexus       Allergies   Allergen Reactions    Ciprofloxacin Hives    Statins-Hmg-Coa Reductase Inhibitors Other (comments)     Body ache       Physical Exam:      Visit Vitals  /58   Pulse 93   Temp 98 °F (36.7 °C)   Resp 17   Ht 5' 2\" (1.575 m)   Wt 224 lb 10.4 oz (101.9 kg)   SpO2 96%   Breastfeeding No   BMI 41.09 kg/m²       GENERAL: alert, cooperative, no distress, appears stated age, morbidly obese, EYE: negative findings: anicteric sclera and EOMI, THROAT & NECK: normal, no erythema or exudates noted. and mucous membranes moist, LUNG: clear to auscultation bilaterally, HEART: regular rate and rhythm, S1, S2 normal, no murmur, click, rub or gallop, ABDOMEN: soft, non-tender. Bowel sounds normal. No masses,  no organomegaly, abnormal findings:  obese, EXTREMITIES: Right upper extremity is warm and well-perfused without any issues. Left upper extremity is warm and well-perfused there is an easily probable thrill within the medial aspect of her left upper arm. She does have bruising and ecchymosis noted within the left upper extremity as well around the fistula.   But it is nontender to touching she has full motion and sensation of the left upper extremity, NEUROLOGIC: negative findings: alert, oriented x3  speech normal in context and clarity  memory intact grossly  cranial nerves II-XII intact  no involuntary movements - tremors    ROS:  Constitutional: negative  Eyes: negative  Ears, nose, mouth, throat, and face: negative  Respiratory: negative  Cardiovascular: negative  Gastrointestinal: negative  Hematologic/lymphatic: negative  Musculoskeletal:negative  Neurological: negative  Behavioral/Psych: negative  Endocrine: negative  Allergic/Immunologic: negative  Unless otherwise mentioned in the HPI.    Data Review:    CBC:   Lab Results   Component Value Date/Time    WBC 8.1 07/16/2020 05:15 AM    RBC 2.38 (L) 07/16/2020 05:15 AM    HGB 7.4 (L) 07/16/2020 05:15 AM    HCT 23.4 (L) 07/16/2020 05:15 AM    PLATELET 097 42/42/2778 05:15 AM      BMP:   Lab Results   Component Value Date/Time    Glucose 133 (H) 07/16/2020 05:15 AM    Sodium 140 07/16/2020 05:15 AM    Potassium 3.4 (L) 07/16/2020 05:15 AM    Chloride 101 07/16/2020 05:15 AM    CO2 30 07/16/2020 05:15 AM    BUN 47 (H) 07/16/2020 05:15 AM    Creatinine 6.23 (H) 07/16/2020 05:15 AM    Calcium 8.2 (L) 07/16/2020 05:15 AM     Coagulation:   Lab Results   Component Value Date/Time    Prothrombin time 15.5 (H) 04/30/2020 09:30 AM    INR 1.3 (H) 04/30/2020 09:30 AM    aPTT 34.6 04/30/2020 09:30 AM         Assessment/Plan     28-year-old female with end-stage renal disease on dialysis. --We will obtain ultrasound of left upper extremity. To determine if she needs a fistulogram.  --Patient has not seen our group since February of this year when she was seen in-house as a consult  --Not quite sure where she was supposed to be getting set procedure. But she is knowledgeable of a fistulogram.  --Depending on what the ultrasound shows will do determine if she truly needs a fistulogram and any other surgical needs. --I did let her know that we are more than happy to perform this while she is in-house.   Hopefully we can get the ultrasound done today and perform any procedure on her tomorrow if necessary  --Please call with any further questions or concerns    Principal Problem:    Complicated UTI (urinary tract infection) (7/15/2020)        Marta Marie MD  July 16, 2020

## 2020-07-16 NOTE — ROUTINE PROCESS
Patient is alert and oriented times three with no signs or symptoms of distress. She has a nephectomy tube on the left flank back area that is capped off and covered with a meplex.  She walks with the walked and skin is intact other than av fistula in the left arm

## 2020-07-17 ENCOUNTER — HOSPITAL ENCOUNTER (INPATIENT)
Dept: INTERVENTIONAL RADIOLOGY/VASCULAR | Age: 75
Discharge: HOME | DRG: 698 | End: 2020-07-17
Attending: PHYSICIAN ASSISTANT
Payer: MEDICARE

## 2020-07-17 VITALS
HEART RATE: 70 BPM | OXYGEN SATURATION: 100 % | DIASTOLIC BLOOD PRESSURE: 62 MMHG | RESPIRATION RATE: 20 BRPM | SYSTOLIC BLOOD PRESSURE: 122 MMHG

## 2020-07-17 DIAGNOSIS — D63.1 ANEMIA OF CHRONIC RENAL FAILURE, UNSPECIFIED CKD STAGE: ICD-10-CM

## 2020-07-17 DIAGNOSIS — N18.9 ANEMIA OF CHRONIC RENAL FAILURE, UNSPECIFIED CKD STAGE: ICD-10-CM

## 2020-07-17 LAB
ANION GAP SERPL CALC-SCNC: 8 MMOL/L (ref 3–18)
BACTERIA SPEC CULT: NORMAL
BACTERIA SPEC CULT: NORMAL
BASOPHILS # BLD: 0 K/UL (ref 0–0.1)
BASOPHILS NFR BLD: 1 % (ref 0–2)
BUN SERPL-MCNC: 51 MG/DL (ref 7–18)
BUN/CREAT SERPL: 9 (ref 12–20)
CALCIUM SERPL-MCNC: 8.1 MG/DL (ref 8.5–10.1)
CHLORIDE SERPL-SCNC: 104 MMOL/L (ref 100–111)
CO2 SERPL-SCNC: 29 MMOL/L (ref 21–32)
CREAT SERPL-MCNC: 5.97 MG/DL (ref 0.6–1.3)
DIFFERENTIAL METHOD BLD: ABNORMAL
EOSINOPHIL # BLD: 0.2 K/UL (ref 0–0.4)
EOSINOPHIL NFR BLD: 3 % (ref 0–5)
ERYTHROCYTE [DISTWIDTH] IN BLOOD BY AUTOMATED COUNT: 14.3 % (ref 11.6–14.5)
GLUCOSE BLD STRIP.AUTO-MCNC: 103 MG/DL (ref 70–110)
GLUCOSE BLD STRIP.AUTO-MCNC: 177 MG/DL (ref 70–110)
GLUCOSE SERPL-MCNC: 80 MG/DL (ref 74–99)
HCT VFR BLD AUTO: 22.6 % (ref 35–45)
HGB BLD-MCNC: 7.1 G/DL (ref 12–16)
LYMPHOCYTES # BLD: 2.3 K/UL (ref 0.9–3.6)
LYMPHOCYTES NFR BLD: 38 % (ref 21–52)
MAGNESIUM SERPL-MCNC: 1.8 MG/DL (ref 1.6–2.6)
MCH RBC QN AUTO: 30.6 PG (ref 24–34)
MCHC RBC AUTO-ENTMCNC: 31.4 G/DL (ref 31–37)
MCV RBC AUTO: 97.4 FL (ref 74–97)
MONOCYTES # BLD: 0.6 K/UL (ref 0.05–1.2)
MONOCYTES NFR BLD: 10 % (ref 3–10)
NEUTS SEG # BLD: 2.9 K/UL (ref 1.8–8)
NEUTS SEG NFR BLD: 48 % (ref 40–73)
PHOSPHATE SERPL-MCNC: 4.8 MG/DL (ref 2.5–4.9)
PLATELET # BLD AUTO: 288 K/UL (ref 135–420)
PMV BLD AUTO: 9.9 FL (ref 9.2–11.8)
POTASSIUM SERPL-SCNC: 3.7 MMOL/L (ref 3.5–5.5)
RBC # BLD AUTO: 2.32 M/UL (ref 4.2–5.3)
SERVICE CMNT-IMP: NORMAL
SODIUM SERPL-SCNC: 141 MMOL/L (ref 136–145)
WBC # BLD AUTO: 6 K/UL (ref 4.6–13.2)

## 2020-07-17 PROCEDURE — 5A1D70Z PERFORMANCE OF URINARY FILTRATION, INTERMITTENT, LESS THAN 6 HOURS PER DAY: ICD-10-PCS | Performed by: INTERNAL MEDICINE

## 2020-07-17 PROCEDURE — 65270000029 HC RM PRIVATE

## 2020-07-17 PROCEDURE — 84100 ASSAY OF PHOSPHORUS: CPT

## 2020-07-17 PROCEDURE — 0T767DZ DILATION OF RIGHT URETER WITH INTRALUMINAL DEVICE, VIA NATURAL OR ARTIFICIAL OPENING: ICD-10-PCS | Performed by: RADIOLOGY

## 2020-07-17 PROCEDURE — 74011250636 HC RX REV CODE- 250/636: Performed by: PHYSICIAN ASSISTANT

## 2020-07-17 PROCEDURE — 74011000250 HC RX REV CODE- 250: Performed by: PHYSICIAN ASSISTANT

## 2020-07-17 PROCEDURE — 85025 COMPLETE CBC W/AUTO DIFF WBC: CPT

## 2020-07-17 PROCEDURE — 74011250636 HC RX REV CODE- 250/636: Performed by: INTERNAL MEDICINE

## 2020-07-17 PROCEDURE — 74011250636 HC RX REV CODE- 250/636: Performed by: STUDENT IN AN ORGANIZED HEALTH CARE EDUCATION/TRAINING PROGRAM

## 2020-07-17 PROCEDURE — 82962 GLUCOSE BLOOD TEST: CPT

## 2020-07-17 PROCEDURE — 90935 HEMODIALYSIS ONE EVALUATION: CPT

## 2020-07-17 PROCEDURE — 83735 ASSAY OF MAGNESIUM: CPT

## 2020-07-17 PROCEDURE — 74011250637 HC RX REV CODE- 250/637: Performed by: STUDENT IN AN ORGANIZED HEALTH CARE EDUCATION/TRAINING PROGRAM

## 2020-07-17 PROCEDURE — 36415 COLL VENOUS BLD VENIPUNCTURE: CPT

## 2020-07-17 PROCEDURE — 74011000250 HC RX REV CODE- 250: Performed by: STUDENT IN AN ORGANIZED HEALTH CARE EDUCATION/TRAINING PROGRAM

## 2020-07-17 PROCEDURE — 74011000250 HC RX REV CODE- 250: Performed by: INTERNAL MEDICINE

## 2020-07-17 PROCEDURE — 77030038269 HC DRN EXT URIN PURWCK BARD -A

## 2020-07-17 PROCEDURE — C1729 CATH, DRAINAGE: HCPCS

## 2020-07-17 PROCEDURE — C1769 GUIDE WIRE: HCPCS

## 2020-07-17 PROCEDURE — 74011000250 HC RX REV CODE- 250: Performed by: RADIOLOGY

## 2020-07-17 PROCEDURE — 77030012865 HC BG URIN LEG MDII -A

## 2020-07-17 PROCEDURE — 87086 URINE CULTURE/COLONY COUNT: CPT

## 2020-07-17 PROCEDURE — 50435 EXCHANGE NEPHROSTOMY CATH: CPT

## 2020-07-17 PROCEDURE — 80048 BASIC METABOLIC PNL TOTAL CA: CPT

## 2020-07-17 PROCEDURE — 74011636320 HC RX REV CODE- 636/320: Performed by: RADIOLOGY

## 2020-07-17 PROCEDURE — 0TP97DZ REMOVAL OF INTRALUMINAL DEVICE FROM URETER, VIA NATURAL OR ARTIFICIAL OPENING: ICD-10-PCS | Performed by: RADIOLOGY

## 2020-07-17 RX ORDER — MAGNESIUM SULFATE 1 G/100ML
1 INJECTION INTRAVENOUS ONCE
Status: COMPLETED | OUTPATIENT
Start: 2020-07-17 | End: 2020-07-17

## 2020-07-17 RX ORDER — MORPHINE SULFATE 2 MG/ML
1 INJECTION, SOLUTION INTRAMUSCULAR; INTRAVENOUS
Status: DISCONTINUED | OUTPATIENT
Start: 2020-07-17 | End: 2020-07-18 | Stop reason: HOSPADM

## 2020-07-17 RX ORDER — LIDOCAINE HYDROCHLORIDE 10 MG/ML
30 INJECTION, SOLUTION EPIDURAL; INFILTRATION; INTRACAUDAL; PERINEURAL ONCE
Status: COMPLETED | OUTPATIENT
Start: 2020-07-17 | End: 2020-07-17

## 2020-07-17 RX ORDER — HEPARIN SODIUM 5000 [USP'U]/ML
5000 INJECTION, SOLUTION INTRAVENOUS; SUBCUTANEOUS EVERY 8 HOURS
Status: DISCONTINUED | OUTPATIENT
Start: 2020-07-17 | End: 2020-07-18 | Stop reason: HOSPADM

## 2020-07-17 RX ADMIN — HEPARIN SODIUM 5000 UNITS: 5000 INJECTION INTRAVENOUS; SUBCUTANEOUS at 18:00

## 2020-07-17 RX ADMIN — ACETAMINOPHEN 650 MG: 325 TABLET ORAL at 18:00

## 2020-07-17 RX ADMIN — CEFTRIAXONE SODIUM 1 G: 1 INJECTION, POWDER, FOR SOLUTION INTRAMUSCULAR; INTRAVENOUS at 08:45

## 2020-07-17 RX ADMIN — HEPARIN SODIUM 5000 UNITS: 5000 INJECTION INTRAVENOUS; SUBCUTANEOUS at 23:47

## 2020-07-17 RX ADMIN — LATANOPROST 1 DROP: 50 SOLUTION OPHTHALMIC at 21:15

## 2020-07-17 RX ADMIN — IOHEXOL 50 ML: 300 INJECTION, SOLUTION INTRAVENOUS at 08:50

## 2020-07-17 RX ADMIN — SODIUM BICARBONATE 650 MG TABLET 1300 MG: at 18:00

## 2020-07-17 RX ADMIN — EPOETIN ALFA-EPBX 4000 UNITS: 4000 INJECTION, SOLUTION INTRAVENOUS; SUBCUTANEOUS at 21:16

## 2020-07-17 RX ADMIN — GABAPENTIN 100 MG: 100 CAPSULE ORAL at 21:12

## 2020-07-17 RX ADMIN — LEVOTHYROXINE SODIUM 125 MCG: 125 TABLET ORAL at 05:41

## 2020-07-17 RX ADMIN — WATER 1 G: 1 INJECTION INTRAMUSCULAR; INTRAVENOUS; SUBCUTANEOUS at 20:06

## 2020-07-17 RX ADMIN — LIDOCAINE HYDROCHLORIDE 30 ML: 10 INJECTION, SOLUTION EPIDURAL; INFILTRATION; INTRACAUDAL; PERINEURAL at 08:45

## 2020-07-17 RX ADMIN — MAGNESIUM SULFATE IN DEXTROSE 1 G: 10 INJECTION, SOLUTION INTRAVENOUS at 17:59

## 2020-07-17 RX ADMIN — MORPHINE SULFATE 1 MG: 2 INJECTION, SOLUTION INTRAMUSCULAR; INTRAVENOUS at 01:45

## 2020-07-17 NOTE — ROUTINE PROCESS
Called mohit about pain meds she says it feels like a ball in her. She is alert and oriented times three with no signs or symptoms of distress other than pain. Drainage bag has cloudy brownish white material in it now.

## 2020-07-17 NOTE — PROGRESS NOTES
SUBJECTIVE:   Called to bedside by nurse because patient started having acute pain with initiation of nephrostomy drainage. Upon arrival, patient was walking from bathroom and complaining of pain. When asked where the pain was, she stated that it was deep in her belly. Characterized as burning, cramping constant pain. Associated with urgency and hesitancy. Nurse stated that drain had purulent drainage and presence of new blood. OBJECTIVE:  Visit Vitals  /69 (BP 1 Location: Right arm, BP Patient Position: At rest)   Pulse 99   Temp 97.3 °F (36.3 °C)   Resp 16   Ht 5' 2\" (1.575 m)   Wt 101.9 kg (224 lb 10.4 oz)   SpO2 99%   Breastfeeding No   BMI 41.09 kg/m²       Physical Exam:  - TTP in suprapubic area and lower abdomen, bladder non-distended  - No tenderness at drain site but purulent discharge under dressing    ASSESSMENT & PLAN: Patient likely having bladder spasms, but unsure why acute onset. No concern for kidney stones given none on CT-abd/pelv yesterday. Bladder scan showed 0 mL. 1. Morphine 1mg IV x1, will give more if continues having pain, but with caution given lower BPs  2. Spoke to Dr. Hernan Toledo (urology) who recommended chua if retaining and Oxybutynin for spasms if scan is neg. Did not recommend new imaging. 3. Remainder of plan per daily progress note      Vidal Allan. Angel Cooper MD, PGY-3  [address] Medicine  07/16/20 10:11 PM      Spoke with nurse about patient condition and was told patient is doing a lot better with Morphine.     - Will continue to monitor closely    Vidal Allan.  Angel Cooper MD, PGY-3  [address] Medicine  07/16/20 10:29 PM

## 2020-07-17 NOTE — ROUTINE PROCESS
Bedside shift change report given to Lenin Hurtado (oncoming nurse) by Franklin Pike  (offgoing nurse). Report included the following information SBAR.

## 2020-07-17 NOTE — PROGRESS NOTES
Problem: Falls - Risk of  Goal: *Absence of Falls  Description: Document Travis Marquez Fall Risk and appropriate interventions in the flowsheet. Outcome: Progressing Towards Goal  Note: Fall Risk Interventions:  Mobility Interventions: Assess mobility with egress test, Communicate number of staff needed for ambulation/transfer, Patient to call before getting OOB, PT Consult for mobility concerns         Medication Interventions: Assess postural VS orthostatic hypotension, Evaluate medications/consider consulting pharmacy, Patient to call before getting OOB, Teach patient to arise slowly    Elimination Interventions: Call light in reach, Elevated toilet seat, Stay With Me (per policy), Toilet paper/wipes in reach, Toileting schedule/hourly rounds              Problem: Patient Education: Go to Patient Education Activity  Goal: Patient/Family Education  Outcome: Progressing Towards Goal     Problem: Pressure Injury - Risk of  Goal: *Prevention of pressure injury  Description: Document Giovany Scale and appropriate interventions in the flowsheet.   Outcome: Progressing Towards Goal  Note: Pressure Injury Interventions:  Sensory Interventions: Assess changes in LOC, Assess need for specialty bed, Check visual cues for pain, Discuss PT/OT consult with provider, Keep linens dry and wrinkle-free    Moisture Interventions: Apply protective barrier, creams and emollients, Check for incontinence Q2 hours and as needed, Internal/External fecal devices, Internal/External urinary devices    Activity Interventions: Assess need for specialty bed, Increase time out of bed, Pressure redistribution bed/mattress(bed type), PT/OT evaluation    Mobility Interventions: Assess need for specialty bed, HOB 30 degrees or less, Pressure redistribution bed/mattress (bed type), PT/OT evaluation         Friction and Shear Interventions: Apply protective barrier, creams and emollients, HOB 30 degrees or less, Minimize layers                Problem: Patient Education: Go to Patient Education Activity  Goal: Patient/Family Education  Outcome: Progressing Towards Goal     Problem: Patient Education: Go to Patient Education Activity  Goal: Patient/Family Education  Outcome: Progressing Towards Goal     Problem: Patient Education: Go to Patient Education Activity  Goal: Patient/Family Education  Outcome: Progressing Towards Goal     Problem: Patient Education: Go to Patient Education Activity  Goal: Patient/Family Education  Outcome: Progressing Towards Goal

## 2020-07-17 NOTE — PROCEDURES
RADIOLOGY POST PROCEDURE NOTE     July 17, 2020       10:03 AM     Preoperative Diagnosis:   Sepsis. Obstructive uropathy. Postoperative Diagnosis:  Same. :  Dr. Brad Modi    Assistant:  None. Type of Anesthesia: 1% plain lidocaine and IV moderate sedation with Versed and Fentanyl. Procedure/Description:  Image guided right PCNU exchange. Findings:   No hydronephrosis or hydroureter. Tube was capped. Estimated blood Loss:  Minimal    Specimen Removed:   Yes    Blood transfusions:  None. Implants:  Right 26 cm 10F PCNU capped.     Complications: None    Condition: Stable    Discharge Plan:  continue present therapy    Ryann Molina MD

## 2020-07-17 NOTE — CONSULTS
Consult Note    Patient: Senthil Schultz               Sex: female          DOA: 7/15/2020         YOB: 1945      Age:  66 y.o.        LOS:  LOS: 2 days              HPI:     Senthil Schultz is a 66 y.o. female who has been seen in evaluation of recurrent UTI in the setting of right PCNU at the request of Dr. Beck Montgomery. Patient has a history of CKD5/ESRD (on dialysis), anemia of chronic disease, HTN, DM2, recurrent UTIs, hydroneprhosis and distal right ureteral stricture. Patient well known to our service as she had a right ureteral recanalization, ureteroplasty and PCNU placement via previously placed right PCN in April 2020. She presented to the ED with complaints of right flank pain and weakness. UA suggestive of infection. ID and Urology were consulted for further management. Past Medical History:   Diagnosis Date    Acidosis     Anemia     Arteriovenous fistula (HCC)     CKD (chronic kidney disease)     Diabetes (HCC)     HLD (hyperlipidemia)     HTN (hypertension)     Hyperparathyroidism due to renal insufficiency (HCC)     Hypothyroid     Kidney stone     Lung mass     Recurrent UTI     Ureter, stricture     Uric acid nephrolithiasis     Urinary incontinence        Past Surgical History:   Procedure Laterality Date    HX APPENDECTOMY      HX CHOLECYSTECTOMY      HX GASTRIC BYPASS      HX KNEE ARTHROSCOPY      HX UROLOGICAL      right PCN placement    HX UROLOGICAL  07/23/2018    RIGHT URETEROSCOPY WITH HOLMIUM LASER    IR EXCHANGE NEPHRO PERC LT SI  2/21/2020    IR EXCHANGE NEPHRO PERC RT SI  4/13/2020    IR NEPHROURETERAL PERC RT PLC CATH NEW ACCESS  SI  4/30/2020       History reviewed. No pertinent family history.     Social History     Socioeconomic History    Marital status: SINGLE     Spouse name: Not on file    Number of children: Not on file    Years of education: Not on file    Highest education level: Not on file   Tobacco Use    Smoking status: Never Smoker    Smokeless tobacco: Never Used   Substance and Sexual Activity    Alcohol use: Never     Frequency: Never    Drug use: Never       Prior to Admission medications    Medication Sig Start Date End Date Taking? Authorizing Provider   biotin 1,000 mcg chew Take 1 Tab by mouth daily. Provider, Historical   cyanocobalamin 1,000 mcg tablet Take 1,000 mcg by mouth daily. Provider, Historical   gabapentin (NEURONTIN) 100 mg capsule Take 100 mg by mouth nightly. Provider, Historical   lactobacillus sp. 50 billion cpu (BIO-K PLUS) 50 billion cell -375 mg cap capsule Take 1 Cap by mouth daily. 2/25/20   Joan Mendez MD   tamsulosin (FLOMAX) 0.4 mg capsule Take 1 Cap by mouth daily. 2/25/20   Joan Mendez MD   sodium bicarbonate 650 mg tablet Take 2 Tabs by mouth three (3) times daily. Indications: excess body acid 2/24/20   Joan Mendez MD   acetaminophen (TYLENOL) 325 mg tablet Take 650 mg by mouth two (2) times a day. Lexus Hogan MD   allopurinoL (ZYLOPRIM) 100 mg tablet Take 200 mg by mouth daily. Lexus Hogan MD   ascorbic acid, vitamin C, (VITAMIN C) 500 mg tablet Take 500 mg by mouth daily. Lexus Hogan MD   calcitRIOL (ROCALTROL) 0.25 mcg capsule Take 0.25 mcg by mouth daily. Lexus Hogan MD   cholecalciferol (VITAMIN D3) (2,000 UNITS /50 MCG) cap capsule Take 2,000 Units by mouth two (2) times a day. Take two tabs a total of 4000 units    Lexus Hogan MD   latanoprost (XALATAN) 0.005 % ophthalmic solution Administer 1 Drop to both eyes nightly. One drop at bedtime    Lexus Hogan MD   levothyroxine (SYNTHROID) 125 mcg tablet Take 125 mcg by mouth Daily (before breakfast). Lexus Hogan MD   omeprazole (PRILOSEC) 20 mg capsule Take 20 mg by mouth daily. Lexus Hogan MD   ondansetron (ZOFRAN ODT) 4 mg disintegrating tablet Take 4 mg by mouth every eight (8) hours as needed for Nausea or Vomiting.     Lexus Hogan MD   vit B Cmplx 3-FA-Vit C-Biotin (NEPHRO HOWIE RX) 7- mg-mg-mcg tablet Take 1 Tab by mouth daily. Samira, MD Lexus       Allergies   Allergen Reactions    Ciprofloxacin Hives    Statins-Hmg-Coa Reductase Inhibitors Other (comments)     Body ache       Review of Systems  Pertinent items are noted in the History of Present Illness. Physical Exam:      Visit Vitals  BP 95/53   Pulse 78   Temp (!) 96.6 °F (35.9 °C)   Resp 20   Ht 5' 2\" (1.575 m)   Wt 101.9 kg (224 lb 10.4 oz)   SpO2 100%   Breastfeeding No   BMI 41.09 kg/m²       Physical Exam:  Constitutional: NAD. Resting in bed. Appears stated age. A&Ox4. Respiratory: Normal respiratory effort. Symmetrical rise and fall of chest.   Cardiovascular: Regular rate. Gastrointestinal: Soft, NT, ND. Labs Reviewed:  CMP:   Lab Results   Component Value Date/Time     07/17/2020 05:18 AM    K 3.7 07/17/2020 05:18 AM     07/17/2020 05:18 AM    CO2 29 07/17/2020 05:18 AM    AGAP 8 07/17/2020 05:18 AM    GLU 80 07/17/2020 05:18 AM    BUN 51 (H) 07/17/2020 05:18 AM    CREA 5.97 (H) 07/17/2020 05:18 AM    GFRAA 8 (L) 07/17/2020 05:18 AM    GFRNA 7 (L) 07/17/2020 05:18 AM    CA 8.1 (L) 07/17/2020 05:18 AM    MG 1.8 07/17/2020 05:18 AM    PHOS 4.8 07/17/2020 05:18 AM     CBC:   Lab Results   Component Value Date/Time    WBC 6.0 07/17/2020 05:18 AM    HGB 7.1 (L) 07/17/2020 05:18 AM    HCT 22.6 (L) 07/17/2020 05:18 AM     07/17/2020 05:18 AM     COAGS:   Lab Results   Component Value Date/Time    APTT 46.9 (H) 07/16/2020 04:52 PM    PTP 14.9 07/16/2020 04:52 PM    INR 1.2 07/16/2020 04:52 PM     Imaging:  CT ABD PELV WO CONT     HISTORY: Right nephroureterostomy. Dysuria.     COMPARISON: Noncontrast abdomen pelvic CT 5/15/2020, 2/20/2020. .     PROCEDURE: Noncontrast renal stone CT was performed. Iterative techniques for  dose savings. Cor/Sag reconstructions performed.     FINDINGS:      LUNG BASES: clear without pleural or pericardial effusion; 2 x 5 mm right  Fissural nodule.  Moderate coronary calcium burden. Elevated eventrated right  hemidiaphragm.     LIVER: unopacified shows no mass or intrahepatic ductal dilation;     PANCREAS: normal appearance without mass, adenopathy, or peripancreatic free  fluid;     SPLEEN: normal appearance without enlargement, perisplenic collaterals, or  adenopathy; splenule.     GALLBLADDER: Surgically absent;     ADRENALS: normal size and shape, no calcifications;     KIDNEYS: Lobulated atrophic left kidney, with multifocal cortical cysts, overall  unchanged. No hydronephrosis or stones.     Right kidney shows nephroureterostomy. There is a loop formed in the right renal  pelvis. The right kidney does show some punctate foci of air in cortical cyst.  There is no perinephric fat stranding or stone. The right ureteral branches  normally located and coils in the bladder. ;     AORTA: Normal size without significant plaque or periaortic fluid;     RETROPERITONEUM: No adenopathy or fat stranding;     BOWEL/MESENTERY: Normal size and distribution, no distention, misting,  adenopathy, or hernia; gastric bypass changes. Sigmoid diverticulosis.     APPENDIX: is not inflamed;     PELVIS: No ascites, adenopathy, hernia, or focal inflammatory process; Urinary  Bladder is unremarkable except for air in the nondependent portion. Uterus is  absent. .     OSSEOUS: Multifocal degenerative changes throughout the thoracolumbar spine. No  definite compression injury. ;     IMPRESSION:      1. Stable right nephroureterostomy without increasing hydronephrosis on the  stone. .  2. No secondary evidence of mass, bowel obstruction, ascites, hernia, or focal  inflammatory process. 3. Bilateral renal cystic disease. .  4. Sigmoid diverticulosis without diverticulitis.   Assessment   Principal Problem:    Complicated UTI (urinary tract infection) (7/15/2020)        Doug Figueroa is a 66 y.o. female with a history of recurrent complex UTIs, ESRD on dialysis, right distal ureteral stricture s/p recanalization, ureteroplasty and PCNU placement presenting with new onset flank pain and weakness. UA positive for WBC, large leukocyte esterase and blood. Plan   Case and images reviewed by Dr. Huang Alvarado. Given that she has a history of complex UTIs and her PCNU was last exchanged in April,  will plan for image-guided right PCNU exchange with moderate sedation as IR schedule allows. Patient to remain NPO with blood thinning medications held. Consent to be obtained prior to procedure.

## 2020-07-17 NOTE — PROGRESS NOTES
Patient ambulated 600 feet with RW and supervision. Pain Level Before FMP: 0/10  Pain Level After FMP: 0/10    [x]  Alerted nurse. [x]  Call bell and phone within patient reach. [x]   Patient resting in chair with no apparent distress . NOTE: Nursing cleared pt to participate in [facility]. Pt agreed to participate in [facility]. Pt needed very minimum assistance from rehab tech throughout session.       Thank you,  Gama Guzman, Rehab Technician

## 2020-07-17 NOTE — MED STUDENT NOTES
*ATTENTION:  This note has been created by a medical student for educational purposes only. Please do not refer to the content of this note for clinical decision-making, billing, or other purposes. Please see attending physicians note to obtain clinical information on this patient. *       .         **MEDICAL STUDENT NOTE. FOR EDUCATIONAL PURPOSES**    Progress Note    Patient: Fallon Jhaveri MRN: [id number]  SSN: [id number]    YOB: 1945  Age: 66 y.o. Sex: female      Admit Date: 7/15/2020    LOS: 2 days     Subjective:     Overnight, pt noted to have pain at nephrostomy site when drained that was relieved with morphine and with negative bladder scan. Unable to talk to pt this morning; pt down at IR. Objective:     Vitals:    07/16/20 1959 07/16/20 2310 07/17/20 0418 07/17/20 0717   BP: 107/69 105/67 95/53    Pulse: 99 93 79 78   Resp: 16 16 16 20   Temp: 97.3 °F (36.3 °C) 97.4 °F (36.3 °C) 97.6 °F (36.4 °C) (!) 96.6 °F (35.9 °C)   SpO2: 99% 97% 97% 100%   Weight:       Height:            Intake and Output:  Current Shift: No intake/output data recorded. Last three shifts: 07/15 1901 - 07/17 0700  In: 1940 [P.O.:1920; I.V.:20]  Out: 1150 [Urine:1150]    Physical Exam:   Unable to perform. Pt at IR for procedure    Lab/Data Review: All lab results for the last 24 hours reviewed. Assessment/Plan:     66 y.o. female with PMH of CKD5, hypothyroidism, GERD, DM2, HTN, HLD, recurrent nephrolithiasis, anemia, who is admitted for complicated UTI. Currently afebrile and tolerating treatment.     Complicated UTI w/ GNRs w/Hx of recurrent UTI/nephrolithiasis s/p nephrostomy tube.  Improving, WBCs downtrending, afebrile  - On cefepime 1g  - FU Urine and Blood Cx  - ID and Urology consulted  - Continue probiotics, allopurinol, and flowmax  - Daily CBC/BMP  - IR changing nephroureteral tube this morning     CKD5/ESRD, dialysis twice weekly  - Nephrology consulted  - Vascular following with regards to if she needs fistulogram  - Trending Cr, daily BMP      Anemia of Chronic Disease. Hgb downtrending   - On EPO  - Continue to monitor, daily CBC     Chronic Metabolic Acidosis   - On sodium bicarb 1300mg three times a day     HTN  - Continue to monitor, currently not on meds due to low BPs     DM2 w/ neuropathy. Controlled  - Continue gabapentin     GERD. Controlled  - On pantoprazole     Hypothyroidism. Asymptomatic  - On levothyroxine     Glaucoma.  Controlled  - On latanoprost    Signed By: Shanell Reeves     July 17, 2020

## 2020-07-17 NOTE — PROGRESS NOTES
Vascular Surgery Progress Note    Admit Date: 7/15/2020  POD * No surgery found *    Procedure:  * No surgery found *      Subjective:     Patient has no new complaints. Objective:     Blood pressure 102/56, pulse 78, temperature (!) 96.6 °F (35.9 °C), resp. rate 20, height 5' 2\" (1.575 m), weight 224 lb 10.4 oz (101.9 kg), SpO2 100 %, not currently breastfeeding.     Temp (24hrs), Av.4 °F (36.3 °C), Min:96.6 °F (35.9 °C), Max:98.2 °F (36.8 °C)      Physical Exam:  GENERAL: alert, cooperative, no distress, appears stated age, LUNG:  clear to auscultation bilaterally, HEART:  regular rate and rhythm, S1, S2 normal, no murmur, click, rub or gallop, ABDOMEN:  obese and EXTREMITIES:  LUE warm and well perfused less bruising and easily palpable thrill of fistula    Labs: Results:       Chemistry Recent Labs     07/17/20  0518 07/16/20  0515 07/15/20  1235   GLU 80 133* 203*    140 139   K 3.7 3.4* 3.6    101 101   CO2 29 30 27   BUN 51* 47* 41*   CREA 5.97* 6.23* 6.02*   CA 8.1* 8.2* 8.6   AGAP 8 9 11   BUCR 9* 8* 7*   AP  --   --  181*   TP  --   --  7.7   ALB  --   --  2.8*   GLOB  --   --  4.9*   AGRAT  --   --  0.6*      CBC w/Diff Recent Labs     07/17/20  0518 07/16/20  0515 07/15/20  1235   WBC 6.0 8.1 10.0   RBC 2.32* 2.38* 2.57*   HGB 7.1* 7.4* 7.9*   HCT 22.6* 23.4* 25.0*    298 332   GRANS 48 63 69   LYMPH 38 25 20*   EOS 3 1 1      Microbiology Recent Labs     07/15/20  1845 07/15/20  1830 07/15/20  1220   CULT NO GROWTH 2 DAYS NO GROWTH 2 DAYS GRAM NEGATIVE RODS*      Coagulation Recent Labs     20  1652   PTP 14.9   INR 1.2   APTT 46.9*         Data Review: images and reports reviewed    Assessment:     Principal Problem:    Complicated UTI (urinary tract infection) (7/15/2020)        Plan/Recommendations/Medical Decision Making:     Continue present treatment   --fistula is good for use  --ultrasound shows no issues  --if problems with access could always perform outpatient transposition and permcath placement. --Please call with any further questions or concerns.

## 2020-07-17 NOTE — PROGRESS NOTES
conducted an initial consultation and Spiritual Assessment for Stevie Hubbard, who is a 66 y.o.,female. Patient's Primary Language is: [place]. According to the patient's EMR Advent Affiliation is: Muslim. The reason the Patient came to the hospital is:   Patient Active Problem List    Diagnosis Date Noted    Complicated UTI (urinary tract infection) 07/15/2020    Type 2 diabetes mellitus, without long-term current use of insulin ([facility] [address] 05/17/2020    CKD (chronic kidney disease) stage 5, GFR less than 15 ml/min ([address] 05/17/2020    Acquired hypothyroidism 05/17/2020    GERD (gastroesophageal reflux disease) 05/17/2020    Anemia 05/15/2020    Hypotension 05/15/2020    UTI (urinary tract infection) 05/15/2020    Anemia of chronic renal failure 03/19/2020    Pyelonephritis 95/97/8110    Complicated urinary tract infection 02/20/2020        The  provided the following Interventions:  Initiated a relationship of care and support. Explored issues of valentina, belief, spirituality and Judaism/ritual needs while hospitalized. Listened empathically. Provided chaplaincy education. Provided information about Spiritual Care Services. Provided a Spiritual Care Pamphlet. Provided information about virtual visits. Offered prayer and assurance of continued prayers on patient's behalf. Chart reviewed. The following outcomes where achieved:  Patient shared limited information about both their medical narrative and spiritual journey/beliefs.  confirmed Patient's Advent Affiliation. Patient processed feeling about current hospitalization. Patient expressed gratitude for 's visit. Assessment:  Patient does not have any Judaism/cultural needs that will affect patient's preferences in health care. There are no spiritual or Judaism issues which require intervention at this time.      Plan:  Chaplains will continue to follow and will provide pastoral care on an as needed/requested basis.  recommends bedside caregivers page  on duty if patient shows signs of acute spiritual or emotional distress.     [id number] [address]   [phone]

## 2020-07-17 NOTE — PROGRESS NOTES
Progress Note      64y F with ESRD, HTN, h/o complex recurrent UTI, admitted for UTI, following for ESRD management. Subjective      S/p nephrostomy tube exchange  Her doppler USG shows patent fistula     IMPRESSION:   ESRD, MWF  Access; left arm fistula, relatively deep, not able to cannulate successfully outpatient. Complicated UTI, nephrolithiasis has right nephrostomy tube,  Anemia , continue epogen. HTN   PLAN:   Plan for HD today with 2K bath UF goal 1-2L as tolerated. May need longer needle, discussed with dialysis staff.       Facility-Administered Medications: None       Current Facility-Administered Medications   Medication Dose Route Frequency    morphine injection 1 mg  1 mg IntraVENous Q4H PRN    magnesium sulfate 1 g/100 ml IVPB (premix or compounded)  1 g IntraVENous ONCE    0.9% sodium chloride infusion 250 mL  250 mL IntraVENous DIALYSIS PRN    epoetin caitlin-epbx (RETACRIT) injection 4,000 Units  4,000 Units SubCUTAneous Q MON, WED & FRI    acetaminophen (TYLENOL) tablet 650 mg  650 mg Oral BID    ascorbic acid (vitamin C) (VITAMIN C) tablet 500 mg  500 mg Oral DAILY    calcitRIOL (ROCALTROL) capsule 0.25 mcg  0.25 mcg Oral DAILY    cholecalciferol (VITAMIN D3) (1000 Units /25 mcg) tablet 2 Tab  2,000 Units Oral DAILY    cyanocobalamin tablet 1,000 mcg  1,000 mcg Oral DAILY    gabapentin (NEURONTIN) capsule 100 mg  100 mg Oral QHS    latanoprost (XALATAN) 0.005 % ophthalmic solution 1 Drop  1 Drop Both Eyes QHS    pantoprazole (PROTONIX) tablet 40 mg  40 mg Oral DAILY    ondansetron (ZOFRAN ODT) tablet 4 mg  4 mg Oral Q8H PRN    sodium bicarbonate tablet 1,300 mg  1,300 mg Oral TID    tamsulosin (FLOMAX) capsule 0.4 mg  0.4 mg Oral DAILY    B complex-vitaminC-folic acid (NEPHROCAP) cap  1 Cap Oral DAILY    levothyroxine (SYNTHROID) tablet 125 mcg  125 mcg Oral 6am    lactobacillus sp. 50 billion cpu (BIO-K PLUS) capsule 1 Cap  1 Cap Oral DAILY    allopurinoL (ZYLOPRIM) tablet 50 mg  50 mg Oral DAILY    cefepime (MAXIPIME) 1 g in sterile water (preservative free) 10 mL IV syringe  1 g IntraVENous Q24H       Review of Systems:     Complete 10-point review of systems were obtained and discussed in length  with the patient. Complete review of systems was negative/unremarkable  except as mentioned in HPI section. Data Review:    Labs: Results:       Chemistry Recent Labs     07/17/20  0518 07/16/20  0515 07/15/20  1235   GLU 80 133* 203*    140 139   K 3.7 3.4* 3.6    101 101   CO2 29 30 27   BUN 51* 47* 41*   CREA 5.97* 6.23* 6.02*   CA 8.1* 8.2* 8.6   AGAP 8 9 11   BUCR 9* 8* 7*   AP  --   --  181*   TP  --   --  7.7   ALB  --   --  2.8*   GLOB  --   --  4.9*   AGRAT  --   --  0.6*      CBC w/Diff Recent Labs     07/17/20  0518 07/16/20  0515 07/15/20  1235   WBC 6.0 8.1 10.0   RBC 2.32* 2.38* 2.57*   HGB 7.1* 7.4* 7.9*   HCT 22.6* 23.4* 25.0*    298 332   GRANS 48 63 69   LYMPH 38 25 20*   EOS 3 1 1      Coagulation Recent Labs     07/16/20  1652   PTP 14.9   INR 1.2   APTT 46.9*       Iron/Ferritin No results for input(s): IRON in the last 72 hours. No lab exists for component: TIBCCALC   BNP No results for input(s): BNPP in the last 72 hours. Cardiac Enzymes No results for input(s): CPK, CKND1, PATTI in the last 72 hours.     No lab exists for component: CKRMB, TROIP   Liver Enzymes Recent Labs     07/15/20  1235   TP 7.7   ALB 2.8*   *      Thyroid Studies No results found for: T4, T3U, TSH, TSHEXT, TSHEXT      EKG: sinus     Physical Assessment:     Visit Vitals  /56   Pulse 78   Temp (!) 96.6 °F (35.9 °C)   Resp 20   Ht 5' 2\" (1.575 m)   Wt 101.9 kg (224 lb 10.4 oz)   SpO2 100%   Breastfeeding No   BMI 41.09 kg/m²     Weight change:     Intake/Output Summary (Last 24 hours) at 7/17/2020 1204  Last data filed at 7/17/2020 9672  Gross per 24 hour   Intake 1210 ml   Output 1150 ml   Net 60 ml     Physical Exam:   General: comfortable, no acute distress   HEENT sclera anicteric, supple neck, no thyromegaly  CVS: S1S2 heard,  no rub  RS: + air entry b/l,   Abd: Soft, Non tender,   Neuro: non focal, awake, alert , CN II-XII are grossly intact  Extrm: ++edema, no cyanosis, clubbing   Skin: no visible  Rash  Musculoskeletal: No gross joints or bone deformities     Procedures/imaging: see electronic medical records for all procedures, Xrays and details which were not copied into this note but were reviewed prior to creation of Plan      Discussed with primary team.       Serenity Tipton MD  July 17, 2020  [facility]  Office [phone]

## 2020-07-17 NOTE — PROGRESS NOTES
Leticia; Family paged  In regards to UC nephrostomy tube clamped. Patient requesting to eat, not sure if she is going for fistalgram today. [place] family will call back shortly.

## 2020-07-17 NOTE — DISCHARGE SUMMARY
[address] Medicine  Discharge Summary    Patient: Rocio Rankin MRN: [id number]  CSN: [id number]    YOB: 1945  Age: 66 y.o. Sex: female      Admission Date: 7/15/2020 Discharge Date: 7/17/20   Attending: Evelyne Mckinney PCP: Jose G Pulido MD     ===================================================================    Reason for Admission: Complicated UTI (urinary tract infection) [N39.0]    Discharge Diagnoses: E.Coli UTI, complicated by nephrolithiasis  ESRD  Anemia    Important notes to PCP/ follow-up studies and evaluations   Follow up further Urine Cultures, ensure full treatment and resolution of UTI      Pending labs and studies:  none    Operative Procedures:   Nephrostomy tube exchange  Dialysis    Discharge Medications:     Discharge Medication List as of 7/18/2020  1:41 PM      START taking these medications    Details   !! cephALEXin (KEFLEX) 500 mg capsule Take 1 Cap by mouth every twelve (12) hours every twelve (12) hours. , Print, Disp-22 Cap,R-0      !! cephALEXin (Keflex) 500 mg capsule Take 1 Cap by mouth two (2) times a day for 11 days. , Print, Disp-22 Cap,R-0       !! - Potential duplicate medications found. Please discuss with provider. CONTINUE these medications which have NOT CHANGED    Details   biotin 1,000 mcg chew Take 1 Tab by mouth daily. , Historical Med      cyanocobalamin 1,000 mcg tablet Take 1,000 mcg by mouth daily. , Historical Med      gabapentin (NEURONTIN) 100 mg capsule Take 100 mg by mouth nightly., Historical Med      lactobacillus sp. 50 billion cpu (BIO-K PLUS) 50 billion cell -375 mg cap capsule Take 1 Cap by mouth daily. , Print, Disp-30 Cap, R-2      tamsulosin (FLOMAX) 0.4 mg capsule Take 1 Cap by mouth daily. , Print, Disp-90 Cap, R-3      sodium bicarbonate 650 mg tablet Take 2 Tabs by mouth three (3) times daily.  Indications: excess body acid, Print, Disp-30 Tab, R-1      acetaminophen (TYLENOL) 325 mg tablet Take 650 mg by mouth two (2) times a day., Historical Med      allopurinoL (ZYLOPRIM) 100 mg tablet Take 200 mg by mouth daily. , Historical Med      ascorbic acid, vitamin C, (VITAMIN C) 500 mg tablet Take 500 mg by mouth daily. , Historical Med      calcitRIOL (ROCALTROL) 0.25 mcg capsule Take 0.25 mcg by mouth daily. , Historical Med      cholecalciferol (VITAMIN D3) (2,000 UNITS /50 MCG) cap capsule Take 2,000 Units by mouth two (2) times a day. Take two tabs a total of 4000 units, Historical Med      latanoprost (XALATAN) 0.005 % ophthalmic solution Administer 1 Drop to both eyes nightly. One drop at bedtime, Historical Med      levothyroxine (SYNTHROID) 125 mcg tablet Take 125 mcg by mouth Daily (before breakfast). , Historical Med      omeprazole (PRILOSEC) 20 mg capsule Take 20 mg by mouth daily. , Historical Med      ondansetron (ZOFRAN ODT) 4 mg disintegrating tablet Take 4 mg by mouth every eight (8) hours as needed for Nausea or Vomiting., Historical Med      vit B Cmplx 3-FA-Vit C-Biotin (NEPHRO HOWIE RX) 1- mg-mg-mcg tablet Take 1 Tab by mouth daily. , Historical Med             Disposition: Home    Consultants:    Nephrology  Urology  Infectious disease  Interventional Radiology      Brief Hospital Course (including pertinent history and physical findings)  Micaela Swartz is a 66 y.o. female with PMH CKD5, acquired hypothyroidism, GERD, T2DM, HTN, HLD,history of recurrent nephrolithiasis, now with a nephrostomy tube(6/20), Urinary incontinence,  controlled with diet, Anemia of chronic renal failure, presenting with complaint of progressive weakness, difficulty standing, and pain in the right flank and back.     Patient stated that she first started feeling poorly approximately three weeks prior, and has been in and out of the hospital multiple times since. She states that she has had a significant worsening of her weakness starting Saturday.  Since then, she has felt less and less able to move, ambulate, or get up, eventually to where she was fearful she would fall and be unable to get up. She feels it has been particularly worse during and after dialysis, stating she felt significantly worse immediately after, and much weaker. Without being able to recover to any significant degree, since the previous session. Patient has had chills, nausea, and a painful right side and flank as well which have worsening in the same period. She denies any dysuria, and has similar urinary symptoms (she is incontinent and has frequency at baseline)    During the ED stay she was found to have a UA concerning for UTI, and had urine culture done. She was admitted, and antibiotics were started. As she has a Right nephrostomy tube, she was scheduled for an exchange, and her nephrostomy opened to gravity. This initially caused much pain, and released foul seeming urine which over a few hours cleared and the pain resolved. She had the tube exhanged, and set to gravity without issue, and had since produced clean appearing urine. She was dialyzed without issue, and improved clinically, until she was feeling strong enough to go home. CURRENT ADMISSION IMAGING RESULTS   Ir Exchange Nephro Perc Rt Si    Result Date: 7/17/2020  IMPRESSION: Successful, uncomplicated right diagnostic antegrade nephrostogram with antegrade nephroureteral stent exchange. Proximal and distal renal collecting systems completely decompressed. Free flow contrast media in the urinary bladder. No debris. No hydronephrosis or hydroureter. Plan: Patient should come back interventional radiology department at approximately 8 weeks for routine exchange of the right nephroureteral stent.         Cardiology Procedures/Testing:  MODALITY RESULTS   EKG Results for orders placed or performed during the hospital encounter of 05/15/20   EKG, 12 LEAD, INITIAL   Result Value Ref Range    Ventricular Rate 72 BPM    Atrial Rate 36 BPM    QRS Duration 80 ms    Q-T Interval 378 ms    QTC Calculation (Bezet) 413 ms Calculated R Axis -20 degrees    Diagnosis       Accelerated Junctional rhythm  Nonspecific ST abnormality  Abnormal ECG  When compared with ECG of 16-JAN-2020 16:20,  Nonspecific T wave abnormality, worse in Inferior leads  Confirmed by Paty Davies ([id number]) on 5/16/2020 1:41:51 PM         ECHO     Nuclear Medicine No results found for this or any previous visit. IR Results from [place] encounter on 07/15/20   IR EXCHANGE NEPHRO PERC RT SI    Impression IMPRESSION:    Successful, uncomplicated right diagnostic antegrade nephrostogram with  antegrade nephroureteral stent exchange. Proximal and distal renal collecting  systems completely decompressed. Free flow contrast media in the urinary  bladder. No debris. No hydronephrosis or hydroureter. Plan: Patient should come back interventional radiology department at  approximately 8 weeks for routine exchange of the right nephroureteral stent. Results from [place] encounter on 04/13/20   IR NEPHROURETERAL PERC RT PLC CATH NEW ACCESS  SI    Impression IMPRESSION:    Successful uncomplicated right nephrostomy tube removal, high-grade distal right  ureteral stricture recanalization, right ureteral high-grade stricture balloon  ureteroplasty as well as placement of the new right nephroureteral catheter. Plan: Patient will come back to interventional radiology in approximately 4  weeks for detailed antegrade nephrostogram and possible nephroureteral catheter  removal. If stricture persists however internalization with double-J stent will  be considered. IR EXCHANGE NEPHRO PERC RT SI    Impression IMPRESSION:    1. Successful, uncomplicated right percutaneous nephrostomy tube placement. Plan: Patient should come back to interventional radiology Department in  approximately 8 weeks for a routine exchange of right percutaneous nephrostomy  tube catheter.        CATH       Special Testing/Procedures:  MODALITY RESULTS   MICRO All Micro Results     Procedure Component Value Units Date/Time    CULTURE, BLOOD [[id number]] Collected:  07/15/20 1830    Order Status:  Completed Specimen:  Blood Updated:  07/20/20 0701     Special Requests: NO SPECIAL REQUESTS        Culture result: NO GROWTH 5 DAYS       CULTURE, BLOOD [[id number]] Collected:  07/15/20 1845    Order Status:  Completed Specimen:  Blood Updated:  07/20/20 0701     Special Requests: NO SPECIAL REQUESTS        Culture result: NO GROWTH 5 DAYS       CULTURE, URINE [[id number]] Collected:  07/17/20 0845    Order Status:  Completed Specimen:  Urine from Nephrostomy Updated:  07/18/20 1200     Special Requests: NO SPECIAL REQUESTS        Culture result: No growth (<1,000 CFU/ML)       CULTURE, URINE [[id number]]  (Abnormal)  (Susceptibility) Collected:  07/15/20 1220    Order Status:  Completed Specimen:  Clean catch Updated:  07/18/20 1051     Special Requests: NO SPECIAL REQUESTS        [place] Count --        >100,000  COLONIES/mL       Culture result: ESCHERICHIA COLI       CULTURE, MRSA [[id number]] Collected:  07/16/20 1742    Order Status:  Completed Specimen:  Nasal from Nares Updated:  07/17/20 1811     Special Requests: NO SPECIAL REQUESTS        Culture result: MRSA NOT PRESENT                   Screening of patient nares for MRSA is for surveillance purposes and, if positive, to facilitate isolation considerations in high risk settings. It is not intended for automatic decolonization interventions per se as regimens are not sufficiently effective to warrant routine use. CULTURE, URINE [[id number]]     Order Status:  Canceled Specimen:  Urine from Nephrostomy          ABG No results found for: PH, PHI, PCO2, PCO2I, PO2, PO2I, HCO3, HCO3I, FIO2, FIO2I   UA No results found for this or any previous visit.    ENDO [unfilled]   [unfilled]    PATH none     Laboratory Results:  LABORATORY RESULTS   HEMATOLOGY Lab Results   Component Value Date/Time    WBC 6.0 07/18/2020 03:25 AM Hemoglobin, POC 10.2 (L) 04/30/2020 10:22 AM    HGB 7.4 (L) 07/18/2020 03:25 AM    Hematocrit, POC 30 (L) 04/30/2020 10:22 AM    HCT 23.6 (L) 07/18/2020 03:25 AM    PLATELET 456 59/31/9642 03:25 AM    MCV 98.3 (H) 07/18/2020 03:25 AM       CHEMISTRIES Lab Results   Component Value Date/Time    Sodium 137 07/18/2020 03:25 AM    Potassium 4.4 07/18/2020 03:25 AM    Chloride 100 07/18/2020 03:25 AM    CO2 33 (H) 07/18/2020 03:25 AM    Anion gap 4 07/18/2020 03:25 AM    Glucose 80 07/18/2020 03:25 AM    BUN 23 (H) 07/18/2020 03:25 AM    Creatinine 3.22 (H) 07/18/2020 03:25 AM    BUN/Creatinine ratio 7 (L) 07/18/2020 03:25 AM    GFR est AA 17 (L) 07/18/2020 03:25 AM    GFR est non-AA 14 (L) 07/18/2020 03:25 AM    Calcium 8.5 07/18/2020 03:25 AM      HEPATIC FUNCTION Lab Results   Component Value Date/Time    Albumin 2.8 (L) 07/15/2020 12:35 PM    Bilirubin, total 0.3 07/15/2020 12:35 PM    Protein, total 7.7 07/15/2020 12:35 PM    Globulin 4.9 (H) 07/15/2020 12:35 PM    A-G Ratio 0.6 (L) 07/15/2020 12:35 PM    ALT (SGPT) 13 07/15/2020 12:35 PM    Alk.  phosphatase 181 (H) 07/15/2020 12:35 PM       LACTIC ACID Lab Results   Component Value Date/Time    Lactic acid 1.1 05/15/2020 08:29 AM      CARDIAC PANEL Lab Results   Component Value Date/Time    Troponin-I, QT <0.02 04/13/2020 09:45 AM      NT-proBNP No results found for: BNP, BNPP, BNPPPOC, XBNPT, BNPNT   THYROID No results found for: TSH, TSHEXT, TSH2, TSH3, TSHP, TSHELE, TT3, T3U, T3UP, FRT3, FT3, T3T, FT4, FT4P, T4, T4P, FT4T, TT7, W2HVJXAKD, TSHEXT, TSHEXT   LIPID PANEL No results found for: CHOL, CHOLPOCT, CHOLX, CHLST, CHOLV, HDL, HDLPOC, HDLP, LDL, LDLCPOC, LDLC, DLDLP, VLDLC, VLDL, TGLX, TRIGL, TRIGP, TGLPOCT, CHHD, CHHDX      RISK CALCULATORS:  SCORE RESULT   ASCVD The ASCVD Risk score (Arun Rosales, et al., 2013) failed to calculate for the following reasons:    ASCVD risk score not calculated    [id number]  NA   HAS-BLED NA   READMISSION RISK SCORE Low Risk 9       Total Score        4 IP Visits Last 12 Months (1-3=4, 4=9, >4=11)    5 Pt. Coverage (Medicare=5 , Medicaid, or Self-Pay=4)        Criteria that do not apply:    Has Seen PCP in Last 6 Months (Yes=3, No=0)    . Living with Significant Other. Assisted Living. LTAC. SNF.  or   Rehab    Patient Length of Stay (>5 days = 3)    Charlson Comorbidity Score (Age + Comorbid Conditions)           Functional status and cognitive function:    Ambulates with: Walker  Status: alert, cooperative, no distress, appears stated age  Condition: STABLE  Disposition: Home    Diet: General Diet    Code status and advanced care plan: DNR    Point of Contact Manjeet Beltrán  Relationship: Son  [phone]     Patient Education:  Patient was educated on the following topics prior to discharge:Dialysis, UTI    Follow-up:   Follow-up Information     Follow up With Specialties Details Why Contact Info    Van Carcamo MD Family Practice Schedule an appointment as soon as possible for a visit in 2 weeks For hospital follow up [address] [facility]      [address]  chosen to continue managing healthcare needs, your preferred agency [place] [address]  [address] [place]  [phone]          =================================================================    Mercedes Moy MD  PGY-1   [address]   Intern Pager: [phone]   07/20/20, 3:13 PM

## 2020-07-17 NOTE — PROGRESS NOTES
[facility]  Progress Note    Patient: Toan Batres MRN: [id number]   SSN: [id number]  YOB: 1945   Age: 66 y.o. Sex: female      Admit Date: 7/15/2020    LOS: 2 days   Chief Complaint   Patient presents with    Urinary Pain       Subjective:     Patient states that she is feeling overall well this AM. She did an episode overnight where, when her tube was hooked up to a bag and set to gravity drain, she had intense pain. Per nursing, the liquid that drained was very cloudy and brownish, and the patient said it was as painful as her childbirthing experience. She was seen and given pain control, and is much better this AM. She did have soft BP this AM per nursing, but did not feel symptomatic per the patient. She states she was not dizzy, lightheaded, or feel as though she might faint, and was able to get up with assist to the [facility] and back without issue. She has been feeling stronger, and able to get up with supervision, and walk while supervised. She was seen this morning comfortably laying in her bed, and was well with no issues or concerns. Patient is COVID negative. Review of Systems   Constitutional: Negative for chills, fever and malaise/fatigue. Respiratory: Negative for cough, shortness of breath and wheezing. Cardiovascular: Negative for chest pain and palpitations. Gastrointestinal: Negative for abdominal pain. Genitourinary: Positive for frequency and urgency. Incontinence   Neurological: Positive for weakness and headaches. Negative for dizziness and loss of consciousness. All other systems reviewed and are negative. Objective:     Visit Vitals  BP 95/53   Pulse 78   Temp (!) 96.6 °F (35.9 °C)   Resp 20   Ht 5' 2\" (1.575 m)   Wt 101.9 kg (224 lb 10.4 oz)   SpO2 100%   Breastfeeding No   BMI 41.09 kg/m²       Physical Exam:   Con: NAD, well developed, obese, laying in bed comfortably.   CV: RRR, normal S1, S2, no murmurs  Resp: CTAB  Abd: mild pain on palpation on right mid abd, not tender otherwise, not distended, soft. Back: nephrostomy tube bandaged and clean, drainage bag connected, light yellow and clear looking. Intake and Output:  Current Shift: No intake/output data recorded. Last three shifts: 07/15 1901 - 07/17 0700  In: 1940 [P.O.:1920; I.V.:20]  Out: 1150 [Urine:1150]    Lab/Data Review:  Recent Results (from the past 12 hour(s))   GLUCOSE, POC    Collection Time: 07/16/20  9:17 PM   Result Value Ref Range    Glucose (POC) 129 (H) 70 - 661 mg/dL   METABOLIC PANEL, BASIC    Collection Time: 07/17/20  5:18 AM   Result Value Ref Range    Sodium 141 136 - 145 mmol/L    Potassium 3.7 3.5 - 5.5 mmol/L    Chloride 104 100 - 111 mmol/L    CO2 29 21 - 32 mmol/L    Anion gap 8 3.0 - 18 mmol/L    Glucose 80 74 - 99 mg/dL    BUN 51 (H) 7.0 - 18 MG/DL    Creatinine 5.97 (H) 0.6 - 1.3 MG/DL    BUN/Creatinine ratio 9 (L) 12 - 20      GFR est AA 8 (L) >60 ml/min/1.73m2    GFR est non-AA 7 (L) >60 ml/min/1.73m2    Calcium 8.1 (L) 8.5 - 10.1 MG/DL   CBC WITH AUTOMATED DIFF    Collection Time: 07/17/20  5:18 AM   Result Value Ref Range    WBC 6.0 4.6 - 13.2 K/uL    RBC 2.32 (L) 4.20 - 5.30 M/uL    HGB 7.1 (L) 12.0 - 16.0 g/dL    HCT 22.6 (L) 35.0 - 45.0 %    MCV 97.4 (H) 74.0 - 97.0 FL    MCH 30.6 24.0 - 34.0 PG    MCHC 31.4 31.0 - 37.0 g/dL    RDW 14.3 11.6 - 14.5 %    PLATELET 096 740 - 727 K/uL    MPV 9.9 9.2 - 11.8 FL    NEUTROPHILS 48 40 - 73 %    LYMPHOCYTES 38 21 - 52 %    MONOCYTES 10 3 - 10 %    EOSINOPHILS 3 0 - 5 %    BASOPHILS 1 0 - 2 %    ABS. NEUTROPHILS 2.9 1.8 - 8.0 K/UL    ABS. LYMPHOCYTES 2.3 0.9 - 3.6 K/UL    ABS. MONOCYTES 0.6 0.05 - 1.2 K/UL    ABS. EOSINOPHILS 0.2 0.0 - 0.4 K/UL    ABS.  BASOPHILS 0.0 0.0 - 0.1 K/UL    DF AUTOMATED     MAGNESIUM    Collection Time: 07/17/20  5:18 AM   Result Value Ref Range    Magnesium 1.8 1.6 - 2.6 mg/dL   PHOSPHORUS    Collection Time: 07/17/20  5:18 AM   Result Value Ref Range    Phosphorus 4.8 2.5 - 4.9 MG/DL       RECENT RESULTS  MODALITY IMPRESSION   XR Results from [place] encounter on 06/23/20   XR CHEST PA LAT    Narrative CHEST PA AND LATERAL:    INDICATIONS: Renal failure beginning dialysis. COMPARISONS: May 15, 2020. FINDINGS:     Two views are obtained . Lungs: Clear. Cardiac Silhouette And Mediastinal Contours: Normal.    Pleural Spaces: No pneumothorax or pleural effusion evident. Bones And Soft Tissues: Unremarkable for age. Impression IMPRESSION:    No acute or active disease evident. CT Results from [place] encounter on 07/15/20   CT ABD PELV WO CONT    Narrative CT ABD PELV WO CONT    HISTORY: Right nephroureterostomy. Dysuria. COMPARISON: Noncontrast abdomen pelvic CT 5/15/2020, 2/20/2020. Gaby Shadow PROCEDURE: Noncontrast renal stone CT was performed. Iterative techniques for  dose savings. Cor/Sag reconstructions performed. FINDINGS:     LUNG BASES: clear without pleural or pericardial effusion; 2 x 5 mm right  Fissural nodule. Moderate coronary calcium burden. Elevated eventrated right  hemidiaphragm. LIVER: unopacified shows no mass or intrahepatic ductal dilation;    PANCREAS: normal appearance without mass, adenopathy, or peripancreatic free  fluid;    SPLEEN: normal appearance without enlargement, perisplenic collaterals, or  adenopathy; splenule. GALLBLADDER: Surgically absent;    ADRENALS: normal size and shape, no calcifications;    KIDNEYS: Lobulated atrophic left kidney, with multifocal cortical cysts, overall  unchanged. No hydronephrosis or stones. Right kidney shows nephroureterostomy. There is a loop formed in the right renal  pelvis. The right kidney does show some punctate foci of air in cortical cyst.  There is no perinephric fat stranding or stone.  The right ureteral branches  normally located and coils in the bladder.;    AORTA: Normal size without significant plaque or periaortic fluid;    RETROPERITONEUM: No adenopathy or fat stranding;    BOWEL/MESENTERY: Normal size and distribution, no distention, misting,  adenopathy, or hernia; gastric bypass changes. Sigmoid diverticulosis. APPENDIX: is not inflamed;    PELVIS: No ascites, adenopathy, hernia, or focal inflammatory process; Urinary  Bladder is unremarkable except for air in the nondependent portion. Uterus is  absent. .    OSSEOUS: Multifocal degenerative changes throughout the thoracolumbar spine. No  definite compression injury. ;      Impression IMPRESSION:     1. Stable right nephroureterostomy without increasing hydronephrosis on the  stone. .  2. No secondary evidence of mass, bowel obstruction, ascites, hernia, or focal  inflammatory process. 3. Bilateral renal cystic disease. .  4. Sigmoid diverticulosis without diverticulitis. MRI No results found for this or any previous visit. ULTRASOUND No results found for this or any previous visit.      Cardiology Procedures/Testing:  MODALITY RESULTS   EKG Results for orders placed or performed during the hospital encounter of 05/15/20   EKG, 12 LEAD, INITIAL   Result Value Ref Range    Ventricular Rate 72 BPM    Atrial Rate 36 BPM    QRS Duration 80 ms    Q-T Interval 378 ms    QTC Calculation (Bezet) 413 ms    Calculated R Axis -20 degrees    Diagnosis       Accelerated Junctional rhythm  Nonspecific ST abnormality  Abnormal ECG  When compared with ECG of 16-JAN-2020 16:20,  Nonspecific T wave abnormality, worse in Inferior leads  Confirmed by Olga Easton ([id number]) on 5/16/2020 1:41:51 PM         ECHO       Special Testing/Procedures:  MODALITY RESULTS   MICRO All Micro Results     Procedure Component Value Units Date/Time    CULTURE, BLOOD [[id number]] Collected:  07/15/20 1830    Order Status:  Completed Specimen:  Blood Updated:  07/17/20 0734     Special Requests: NO SPECIAL REQUESTS        Culture result: NO GROWTH 2 DAYS       CULTURE, BLOOD [[id number]] Collected:  07/15/20 1845    Order Status: Completed Specimen:  Blood Updated:  07/17/20 0734     Special Requests: NO SPECIAL REQUESTS        Culture result: NO GROWTH 2 DAYS       CULTURE, URINE [[id number]]     Order Status:  Canceled Specimen:  Urine from Nephrostomy     CULTURE, URINE [[id number]]     Order Status:  Sent Specimen:  Urine from Nephrostomy     CULTURE, MRSA [[id number]] Collected:  07/16/20 1742    Order Status:  Completed Specimen:  Nasal from Nares Updated:  07/16/20 1759    CULTURE, URINE [[id number]]  (Abnormal) Collected:  07/15/20 1220    Order Status:  Completed Specimen:  Clean catch Updated:  07/16/20 1519     Special Requests: NO SPECIAL REQUESTS        [place] Count --        >100,000  COLONIES/mL       Culture result: GRAM NEGATIVE RODS            UA No results found for this or any previous visit. PATH None     Telemetry YES   Oxygen NONE     Assessment and Plan:   66 y.o. female with PMH CKD5, acquired hypothyroidism, GERD, T2DM, HTN, HLD,history of recurrent nephrolithiasis, now with a nephrostomy tube(6/20), Urinary incontinence,  controlled with diet, Anemia of chronic renal failure, now presenting with complaint of progressive weakness, difficulty standing, and pain in the right flank and back.     1. Complicated UTI vs pyelonephritis 2/2 nephrostomy tube/nephrolithiasis, with history of recurrent UTI. Patient presented with 3 days of weakness, Right sided flank pain, his a history of recurrent UTI, stones, and nephrostomy and stent for stone. Also Hx of strictures, severe hydronephrosis. No leucocytosis in ED, CT unimpressive for infection, tachycardic in the ED. Vitals stable otherwise. UA with too numerous WBC, Large Leucocyte esterase, (-) nitrites, moderate blood.                         -continue cefepime, appreciate ID recs              -FU new Urine culture from nephrostomy tube, prev shows E.  Coli              -Urology consulted/IR consulted, for nephrostomy tube change today              -continue home allopurinol, flomax, probiotics              -Daily bmp, cbc              -PT/OT     CKD5/ESRD  On dialysis twice weekly. Cr elevated 6.02>5.97 with baseline approx 3.5. Last dialysis 7/13              -nephrology on board, appreciate their assistance              -Renal dose medication              -Trend Cr, K, BUN, mag, phos   -Replete as needed     Anemia/Anemia of chronic disease/kidney failure, 2/2 ESRD  Currently HGB 7.9, down from month ago at 9.2. Last iron study 5/8/2020: Iron 72, TIBC 263, Iron saturation 27, Ferritin 70. HgB 7.9 on admission, no signs of active bleeding. 7.1 This AM              -likely part of ESRD              -Monitor daily cbc              -Defer to nephrology for dialysis (M, F) and related anemia injections and treatments              -consider blood transfusion if HGB <7.0     Chronic Metabolic Acidosis likely 2/2 to her ESRD  - Continue on home NaHCO2 650mg two tablets TID     Hx of HTN              -previously on amlodipine and coreg, but stopped due to low BP.               -monitor BP     Hx of T2DM, peripheral neuropathy              -Last HbA1c <3.5   -<3.8 this admit              -Taking Gabapentin, 100mg QHs              -Monitor via BMP, no need for accucheck at this time     GERD              -On omeprazole at home              -Protonix 40mg every day     Hypothyroidism              -Continue synthroid 125mcg, home dose     Glaucoma              -Continue home meds/eye drops    Diet Renal   DVT Prophylaxis SQH, held for procedure   GI Prophylaxis Protonix   Code status DNR. DNI   Disposition 1-2 nights     Point of Contact [employer]  Relationship: Son  [phone]     Page Lauren MD, PGY-1   Mathieu Ch   Intern Pager: [phone]   July 17, 2020, 8:27 AM

## 2020-07-17 NOTE — PROGRESS NOTES
Infectious Disease Progress Note        Reason: PCN tube associated UTI    Current abx Prior abx   Ceftriaxone - Cefepime 7/15-     Assessment :  UTI-> GNR  Hx of recurrent UTI/stones  --s/p Rt nephrostomy tube (since 2018)  --Last cx growing E coli  --Last stent change 2020 from what I can see; now exchanged 2020    ESRD, new dialysis    Generalized weakness    Anemia    Cipro allergy    Recommendations:  --Now s/p stent exchange, would count abx days when it occurred. She was on cefepime but in chart it appears she was given ceftriaxone this morning. Will monitor clinically. --Monitor results of urine cx-> GNR growing  --Blood cx negative thus far. --Hemoglobin per primary  --Alter abx once results of urine cx return  --Additional recs to come pending return of data    Thank you for consultation request. Above plan was discussed in details with patient. Please call me if any further questions or concerns. Will continue to participate in the care of this patient. HPI:  Pt reports that she had what felt like bladder spasms last night and they were very painful for her, but it hs resolved this morning and she is now pain free  She denies n/v/abd pain/diarrhea  Feels stronger already    Allergies: Ciprofloxacin and Statins-hmg-coa reductase inhibitors  Temp (24hrs), Av.4 °F (36.3 °C), Min:96.6 °F (35.9 °C), Max:98.2 °F (36.8 °C)    Visit Vitals  /56   Pulse 78   Temp (!) 96.6 °F (35.9 °C)   Resp 20   Ht 5' 2\" (1.575 m)   Wt 101.9 kg (224 lb 10.4 oz)   SpO2 100%   Breastfeeding No   BMI 41.09 kg/m²       ROS: 12 point ROS obtained in details. Pertinent positives as mentioned in HPI,   otherwise negative    Physical Exam:  General: Well developed, well nourished female laying on the bed, in no acute distress.   HEENT: EOMI, anicteric sclera, no oral lesions or thrush  PULM: CTAB anteriorly  CV: RRR no m/r/g appreciated  ABD: bs+, soft, NT, ND  EXT: no edema b/l LE  SKIN: no acute rash on limited skin exam, some ecchymoses to the LUE  NEURO: A&O x 4, nonfocal      Labs: Results:   Chemistry Recent Labs     07/17/20  0518 07/16/20  0515 07/15/20  1235   GLU 80 133* 203*    140 139   K 3.7 3.4* 3.6    101 101   CO2 29 30 27   BUN 51* 47* 41*   CREA 5.97* 6.23* 6.02*   CA 8.1* 8.2* 8.6   AGAP 8 9 11   BUCR 9* 8* 7*   AP  --   --  181*   TP  --   --  7.7   ALB  --   --  2.8*   GLOB  --   --  4.9*   AGRAT  --   --  0.6*      CBC w/Diff Recent Labs     07/17/20  0518 07/16/20  0515 07/15/20  1235   WBC 6.0 8.1 10.0   RBC 2.32* 2.38* 2.57*   HGB 7.1* 7.4* 7.9*   HCT 22.6* 23.4* 25.0*    298 332   GRANS 48 63 69   LYMPH 38 25 20*   EOS 3 1 1      Microbiology Recent Labs     07/15/20  1845 07/15/20  1830 07/15/20  1220   CULT NO GROWTH 2 DAYS NO GROWTH 2 DAYS GRAM NEGATIVE RODS*          RADIOLOGY:  Reviewed, none new    Dr. Danyelle Martino, Infectious Disease Specialist  July 17, 2020

## 2020-07-17 NOTE — PROGRESS NOTES
Urology Progress Note        Assessment/Plan:     Patient Active Problem List   Diagnosis Code    Pyelonephritis O49    Complicated urinary tract infection N39.0    Anemia of chronic renal failure N18.9, D63.1    Anemia D64.9    Hypotension I95.9    UTI (urinary tract infection) N39.0    Type 2 diabetes mellitus, without long-term current use of insulin ([facility]) E11.9    CKD (chronic kidney disease) stage 5, GFR less than 15 ml/min ([facility]) N18.5    Acquired hypothyroidism E03.9    GERD (gastroesophageal reflux disease) R64.6    Complicated UTI (urinary tract infection) N39.0     Assessment:   Right distal ureteral stricture managed with PCNT since 2018              -Originally evaluated in [facility])              -Last PCNUT exchange on 2020     ESRD- newly on dialysis     UTI    Plan:    Patient out of room during rounds   - Plan for IR to exchange right PCNU today   - Will need repeat urine culture from new PCNU  - She will need nephrostomy tube or stent as long as she is making some urine. Dr. Carissa Bradley does not recommend removing unless can get sterile urine and becomes oliguric.   - Will see again on Monday, sooner if needed     Follow up arranged? NO . Will need follow up with Dr. Maykel Van PA-C    [phone]      Subjective:     Daily Progress Note: 2020 12:49 PM    Prudencprem Webb is out of room during rounds     Objective:     Visit Vitals  /56   Pulse 78   Temp (!) 96.6 °F (35.9 °C)   Resp 20   Ht 5' 2\" (1.575 m)   Wt 101.9 kg (224 lb 10.4 oz)   SpO2 100%   Breastfeeding No   BMI 41.09 kg/m²        Temp (24hrs), Av.4 °F (36.3 °C), Min:96.6 °F (35.9 °C), Max:98.2 °F (36.8 °C)      Intake and Output:  07/15 1901 -  0700  In: 1940 [P.O.:1920; I.V.:20]  Out: 1150 [Urine:1150]  No intake/output data recorded.     PHYSICAL EXAMINATION:   Visit Vitals  /56   Pulse 78   Temp (!) 96.6 °F (35.9 °C)   Resp 20   Ht 5' 2\" (1.575 m)   Wt 101.9 kg (224 lb 10.4 oz) SpO2 100%   Breastfeeding No   BMI 41.09 kg/m²       Lab/Data Review: All lab results for the last 24 hours reviewed.     Labs:     Labs: Results:   Chemistry    Recent Labs     07/17/20  0518 07/16/20  0515 07/15/20  1235   GLU 80 133* 203*    140 139   K 3.7 3.4* 3.6    101 101   CO2 29 30 27   BUN 51* 47* 41*   CREA 5.97* 6.23* 6.02*   CA 8.1* 8.2* 8.6   AGAP 8 9 11   BUCR 9* 8* 7*   AP  --   --  181*   TP  --   --  7.7   ALB  --   --  2.8*   GLOB  --   --  4.9*   AGRAT  --   --  0.6*      CBC w/Diff Recent Labs     07/17/20  0518 07/16/20  0515 07/15/20  1235   WBC 6.0 8.1 10.0   RBC 2.32* 2.38* 2.57*   HGB 7.1* 7.4* 7.9*   HCT 22.6* 23.4* 25.0*    298 332   GRANS 48 63 69   LYMPH 38 25 20*   EOS 3 1 1      Cultures Recent Labs     07/15/20  1845 07/15/20  1830 07/15/20  1220   CULT NO GROWTH 2 DAYS NO GROWTH 2 DAYS GRAM NEGATIVE RODS*     All Micro Results     Procedure Component Value Units Date/Time    CULTURE, URINE [[id number]] Collected:  07/17/20 0845    Order Status:  Completed Specimen:  Urine from Nephrostomy Updated:  07/17/20 1021    CULTURE, BLOOD [[id number]] Collected:  07/15/20 1830    Order Status:  Completed Specimen:  Blood Updated:  07/17/20 0734     Special Requests: NO SPECIAL REQUESTS        Culture result: NO GROWTH 2 DAYS       CULTURE, BLOOD [[id number]] Collected:  07/15/20 1845    Order Status:  Completed Specimen:  Blood Updated:  07/17/20 0734     Special Requests: NO SPECIAL REQUESTS        Culture result: NO GROWTH 2 DAYS       CULTURE, URINE [[id number]]     Order Status:  Canceled Specimen:  Urine from Nephrostomy     CULTURE, MRSA [[id number]] Collected:  07/16/20 1742    Order Status:  Completed Specimen:  Nasal from Nares Updated:  07/16/20 1759    CULTURE, URINE [[id number]]  (Abnormal) Collected:  07/15/20 1220    Order Status:  Completed Specimen:  Clean catch Updated:  07/16/20 1478     Special Requests: NO SPECIAL REQUESTS        [place] Count -- >100,000  COLONIES/mL       Culture result: GRAM NEGATIVE RODS               Urinalysis Color   Date Value Ref Range Status   07/15/2020 YELLOW   Final     Appearance   Date Value Ref Range Status   07/15/2020 OPAQUE   Final     Specific gravity   Date Value Ref Range Status   07/15/2020 1.025 1.003 - 1.030   Final     pH (UA)   Date Value Ref Range Status   07/15/2020 6.5 5.0 - 8.0   Final     Protein   Date Value Ref Range Status   07/15/2020 >300 (A) NEG mg/dL Final     Ketone   Date Value Ref Range Status   07/15/2020 Negative NEG mg/dL Final     Bilirubin   Date Value Ref Range Status   07/15/2020 Negative NEG   Final     Blood   Date Value Ref Range Status   07/15/2020 MODERATE (A) NEG   Final     Urobilinogen   Date Value Ref Range Status   07/15/2020 0.2 0.2 - 1.0 EU/dL Final     Nitrites   Date Value Ref Range Status   07/15/2020 Negative NEG   Final     Leukocyte Esterase   Date Value Ref Range Status   07/15/2020 LARGE (A) NEG   Final     Potassium   Date Value Ref Range Status   07/17/2020 3.7 3.5 - 5.5 mmol/L Final     Creatinine   Date Value Ref Range Status   07/17/2020 5.97 (H) 0.6 - 1.3 MG/DL Final     BUN   Date Value Ref Range Status   07/17/2020 51 (H) 7.0 - 18 MG/DL Final      PSA No results for input(s): PSA in the last 72 hours.    Coagulation Lab Results   Component Value Date/Time    Prothrombin time 14.9 07/16/2020 04:52 PM    Prothrombin time 15.5 (H) 04/30/2020 09:30 AM    INR 1.2 07/16/2020 04:52 PM    INR 1.3 (H) 04/30/2020 09:30 AM    aPTT 46.9 (H) 07/16/2020 04:52 PM    aPTT 34.6 04/30/2020 09:30 AM

## 2020-07-17 NOTE — PROGRESS NOTES
Chart reviewed. Per Urology, pt will be here through weekend. Plan currently is for home with Home health. LTSS done yesterday. Will continue to monitor for changing discharge needs.   Everardo Murphy RN - Outcomes Manager  [phone]

## 2020-07-17 NOTE — PROGRESS NOTES
TRANSFER - OUT REPORT:    Verbal report given to Cleda Ganser RN(name) on [place]  being transferred to (unit) for routine post - op       Report consisted of patients Situation, Background, Assessment and   Recommendations(SBAR). Information from the following report(s) SBAR, Kardex, Procedure Summary and MAR was reviewed with the receiving nurse. Lines:   Venous Access Device AV Fistula (Active)       Peripheral IV 07/15/20 Right;Upper Arm (Active)   Site Assessment Clean, dry, & intact 07/17/20 0418   Phlebitis Assessment 0 07/17/20 0418   Infiltration Assessment 0 07/17/20 0418   Dressing Status Clean, dry, & intact 07/17/20 0418   Dressing Type Transparent 07/17/20 0418   Hub Color/Line Status Blue 07/17/20 0418   Action Taken Open ports on tubing capped 07/17/20 0418   Alcohol Cap Used Yes 07/17/20 0418        Opportunity for questions and clarification was provided.       Patient transported with:   Registered Nurse

## 2020-07-17 NOTE — DIALYSIS
DAT        ACUTE HEMODIALYSIS FLOW SHEET      HEMODIALYSIS ORDERS: Physician: Adolfo Morton     Dialyzer: revaclear   Duration: 3.5 hr  BFR: 350   DFR: 600   Dialysate:  Temp 36-37*C  K+   2    Ca+  2.5 Na 138 Bicarb 35   Weight: 101.9 kg   Patient Chart [x]     Unable to Obtain []   Dry weight/UF Goal: 1500 Access AVF  Needle Gauge 15    Heparin []  Bolus      Units    [] Hourly       Units    [x]None      Catheter locking solution    Pre BP:   111/59    Pulse:     87       Respirations: 16  Temperature:   97.9   Labs: Pre        Post:        [x] N/A   Additional Orders(medications, blood products, hypotension management) [x] N/A     [x] Dat Consent Verified       GRAFT/FISTULA ACCESS:  []N/A     []Right     [x]Left     [x]UE     []LE   []AVG   [x]AVF        []Buttonhole    [x]Medical Aseptic Prep Utilized   [x]No S/S infection  []Redness  []Drainage []Cultured []Swelling []Pain    Bruit:   [x] Strong    [] Weak       Thrill :   [x] Strong    [] Weak       Needle Gauge: 15  Length: 1   If access problem,  notified: []Yes     [x]N/A  Date:        Please describe access if present and not used:                            GENERAL ASSESSMENT:      LUNGS:  Rate  SaO2% [] N/A    [x] Clear  [] Coarse  [] Crackles  [] Wheezing        [] Diminished     Location : []RLL   []LLL    []RUL  []LALI     Cough: []Productive  []Dry  [x]N/A   Respirations:  [x]Easy  []Labored     Therapy:   [x]RA  []NC  l/min    Mask: []NRB []Venti       O2%                  []Ventilator  []Intubated  [] Trach  [] BiPaP     CARDIAC: [x]Regular      [] Irregular   [] Pericardial Rub  [] JVD        []  Monitored  [] Bedside  [] Remotely monitored [] N/A  Rhythm:      EDEMA: [] None  [x]Generalized  [] Pitting [] 1    [] 2    [] 3    [] 4                 [] Facial  [] Pedal  []  UE  [] LE     SKIN:   [x] Warm  [] Hot     [] Cold   [x] Dry     [] Pale   [] Diaphoretic                  [] Flushed  [] Jaundiced  [] Cyanotic  [] Rash  [] Weeping     LOC: [x] Alert      [x]Oriented:    [x] Person     [x] Place  [x]Time               [] Confused  [] Lethargic  [] Medicated  [] Non-responsive     GI / ABDOMEN:  [] Flat    [] Distended    [x] Soft    [] Firm   []  Obese                             [] Diarrhea  [x] Bowel Sounds  [] Nausea  [] Vomiting       / URINE ASSESSMENT:[] Voiding   [x] Oliguria  [] Anuria   []  Cline     [] Incontinent    []  Incontinent Brief      []  Bathroom Privileges       PAIN: [x] 0 []1  []2   []3   []4   []5   []6   []7   []8   []9   []10              Scale 0-10  Action/Follow Up:      MOBILITY:  [] Amb    [] Amb/Assist    [x] Bed    [] Wheelchair  [] Stretcher      All Vitals and Treatment Details on Attached [address]: [facility]          Room # 191/66      [] 1st Time Acute  [] Stat  [x] Routine  [] Urgent     [x] Acute Room  []  Bedside  [] ICU/CCU  [] ER   Isolation Precautions:   There are currently no Active Isolations      Special Considerations:         [] Blood Consent Verified [x]N/A     ALLERGIES:   Allergies   Allergen Reactions    Ciprofloxacin Hives    Statins-Hmg-Coa Reductase Inhibitors Other (comments)     Body ache               Code Status:DNR        Hepatitis Status:                        No results found for: HAMAT, HAAB, HABT, HAAT, HBSAG, HBSB, HBSAT, HBABN, HBCM, HBCAB, HBCAT, XBCABS, HBEAB, HBEAG, XHEPCS, [id number], HBEGLT, HBCMLT, HBCLT, HBEBLT, [id number], [id number], HAVMLT, [id number], HBCMLT, [id number], HCGAT                  Current Labs:   Lab Results   Component Value Date/Time    Sodium 141 07/17/2020 05:18 AM    Potassium 3.7 07/17/2020 05:18 AM    Chloride 104 07/17/2020 05:18 AM    CO2 29 07/17/2020 05:18 AM    Anion gap 8 07/17/2020 05:18 AM    Glucose 80 07/17/2020 05:18 AM    BUN 51 (H) 07/17/2020 05:18 AM    Creatinine 5.97 (H) 07/17/2020 05:18 AM    BUN/Creatinine ratio 9 (L) 07/17/2020 05:18 AM    GFR est AA 8 (L) 07/17/2020 05:18 AM    GFR est non-AA 7 (L) 07/17/2020 05:18 AM    Calcium 8.1 (L) 07/17/2020 05:18 AM      Lab Results   Component Value Date/Time    WBC 6.0 07/17/2020 05:18 AM    Hemoglobin, POC 10.2 (L) 04/30/2020 10:22 AM    HGB 7.1 (L) 07/17/2020 05:18 AM    Hematocrit, POC 30 (L) 04/30/2020 10:22 AM    HCT 22.6 (L) 07/17/2020 05:18 AM    PLATELET 254 96/30/5298 05:18 AM    MCV 97.4 (H) 07/17/2020 05:18 AM                                                                                     DIET: DIET RENAL       PRIMARY NURSE REPORT: First initial/Last name/Title      Pre Dialysis: Norma Hugo RN     Time: 0930      EDUCATION:    [x] Patient [] Other         Knowledge Basis: []None [x]Minimal [] Substantial   Barriers to learning  [x]N/A   [] Access Care     [] S&S of infection     [] Fluid Management     []K+     [x]Procedural    []Albumin     [] Medications     [] Tx Options     [] Transplant     [] Diet     [] Other   Teaching Tools:  [x] Explain  [] Demo  [] Handouts [] Video  Patient response:   [x] Verbalized understanding  [] Teach back  [] Return demonstration [] Requires follow up   Inappropriate due to            [x] Time Out/Safety Check  [x]Extracorporeal Circuit Tested for integrity       [address] - Before each treatment:     Machine Number:                   [facility]                                  [x] Unit Machine # 9 with centralized RO                                  [] Portable Machine #1/RO serial # [id number]                                  [] Portable Machine #2/RO serial # [id number]                                  [] Portable Machine #4/RO serial # [id number]                                                     [address] [place] [address]                                  [] Portable Machine #11/RO serial # [id number]                                   [] Portable Machine #12/RO serial # [id number]                                  [] Portable Machine #13/RO serial #  [id number]      Alarm Test:  Pass time 1100               [x] RO/Machine Log Complete      Temp    36*-37*             Dialysate: pH  7.4 Conductivity: Meter   14     HD Machine   14                  TCD: 14  Dialyzer Lot # [id number]          Blood Tubing Lot # [id number]          Saline Lot #  [id number]     CHLORINE TESTING-Before each treatment and every 4 hours    Total Chlorine: [x] less than 0.1 ppm  Time: 0900 4 Hr/2nd Check Time: 1300   (if greater than 0.1 ppm from Primary then every 30 minutes from Secondary)     TREATMENT INITIATION - with Dialysis Precautions:   [x] All Connections Secured                 [x] Saline Line Double Clamped   [x] Venous Parameters Set                  [x] Arterial Parameters Set    [x] Prime Given 250ml                          [x]Air Foam Detector Engaged      Treatment Initiation Note:Pt in stable condition. AVF accessed and treatment initiated without complication. Post Assessment:   Dialyzer Cleared: [] Good [x] Fair  [] Poor  Blood processed:  68.8 L  UF Removed  1500 Ml  POst BP:   135/91       Pulse: 74        Respirations: 16  Temperature: 98.1 Lungs:     [x] Clear      [] Course         [] Crackles    [] Wheezing         [] Diminished   Post Tx Vascular Access:   AVF/AVG: Bleeding stopped   Art 5 min. Jasper. 5 Min   Cardiac:   [x] Regular   [] Irregular   [] Monitor  [] N/A      Rhythm:       Catheter:   Locking solution: Heparin 1:1000   Art. Jasper. N/A    Skin:   Pain:    [x] Warm  [x] Dry [] Diaphoretic    [] Flushed    [] Pale [] Cyanotic [x]0  []1  []2   []3  []4   []5   []6   []7   []8   []9   []10     Post Treatment Note:   HD well tolerated. 1.5L UF removed.  NAD noted during or post treatment       POST TREATMENT PRIMARY NURSE HANDOFF REPORT:     First initial/Last name/Title         Post Dialysis: Elizabeth Paredes RN Time:  9250     Abbreviations: AVG-arterial venous graft, AVF-arterial venous fistula, IJ-Internal Jugular, Subcl-Subclavian, Fem-Femoral, Tx-treatment, AP/HR-apical heart rate, DFR-dialysate flow rate, BFR-blood flow rate, AP-arterial pressure, -venous pressure, UF-ultrafiltrate, TMP-transmembrane pressure, Jasper-Venous, Art-Arterial, RO-Reverse Osmosis

## 2020-07-17 NOTE — ROUTINE PROCESS
Patient is in a lot of pain doctor in room with her. Sent a urine culture to the lab it is millky white in color very turbulent. Patient was given morphine for pain and she is much better now. Tube is now hooked to a bag.

## 2020-07-18 ENCOUNTER — HOME HEALTH ADMISSION (OUTPATIENT)
Dept: HOME HEALTH SERVICES | Facility: HOME HEALTH | Age: 75
End: 2020-07-18
Payer: MEDICARE

## 2020-07-18 VITALS
HEIGHT: 62 IN | BODY MASS INDEX: 41.34 KG/M2 | TEMPERATURE: 97.6 F | RESPIRATION RATE: 18 BRPM | OXYGEN SATURATION: 98 % | WEIGHT: 224.65 LBS | DIASTOLIC BLOOD PRESSURE: 63 MMHG | HEART RATE: 92 BPM | SYSTOLIC BLOOD PRESSURE: 102 MMHG

## 2020-07-18 LAB
ANION GAP SERPL CALC-SCNC: 4 MMOL/L (ref 3–18)
BACTERIA SPEC CULT: ABNORMAL
BACTERIA SPEC CULT: NORMAL
BASOPHILS # BLD: 0 K/UL (ref 0–0.1)
BASOPHILS NFR BLD: 0 % (ref 0–2)
BUN SERPL-MCNC: 23 MG/DL (ref 7–18)
BUN/CREAT SERPL: 7 (ref 12–20)
CALCIUM SERPL-MCNC: 8.5 MG/DL (ref 8.5–10.1)
CC UR VC: ABNORMAL
CHLORIDE SERPL-SCNC: 100 MMOL/L (ref 100–111)
CO2 SERPL-SCNC: 33 MMOL/L (ref 21–32)
CREAT SERPL-MCNC: 3.22 MG/DL (ref 0.6–1.3)
DIFFERENTIAL METHOD BLD: ABNORMAL
EOSINOPHIL # BLD: 0.1 K/UL (ref 0–0.4)
EOSINOPHIL NFR BLD: 2 % (ref 0–5)
ERYTHROCYTE [DISTWIDTH] IN BLOOD BY AUTOMATED COUNT: 14.4 % (ref 11.6–14.5)
GLUCOSE BLD STRIP.AUTO-MCNC: 106 MG/DL (ref 70–110)
GLUCOSE SERPL-MCNC: 80 MG/DL (ref 74–99)
HCT VFR BLD AUTO: 23.6 % (ref 35–45)
HGB BLD-MCNC: 7.4 G/DL (ref 12–16)
LYMPHOCYTES # BLD: 2 K/UL (ref 0.9–3.6)
LYMPHOCYTES NFR BLD: 33 % (ref 21–52)
MAGNESIUM SERPL-MCNC: 2.1 MG/DL (ref 1.6–2.6)
MCH RBC QN AUTO: 30.8 PG (ref 24–34)
MCHC RBC AUTO-ENTMCNC: 31.4 G/DL (ref 31–37)
MCV RBC AUTO: 98.3 FL (ref 74–97)
MONOCYTES # BLD: 0.7 K/UL (ref 0.05–1.2)
MONOCYTES NFR BLD: 11 % (ref 3–10)
NEUTS SEG # BLD: 3.2 K/UL (ref 1.8–8)
NEUTS SEG NFR BLD: 54 % (ref 40–73)
PHOSPHATE SERPL-MCNC: 3.3 MG/DL (ref 2.5–4.9)
PLATELET # BLD AUTO: 296 K/UL (ref 135–420)
PMV BLD AUTO: 9.6 FL (ref 9.2–11.8)
POTASSIUM SERPL-SCNC: 4.4 MMOL/L (ref 3.5–5.5)
RBC # BLD AUTO: 2.4 M/UL (ref 4.2–5.3)
SERVICE CMNT-IMP: ABNORMAL
SERVICE CMNT-IMP: NORMAL
SODIUM SERPL-SCNC: 137 MMOL/L (ref 136–145)
WBC # BLD AUTO: 6 K/UL (ref 4.6–13.2)

## 2020-07-18 PROCEDURE — 74011250637 HC RX REV CODE- 250/637: Performed by: STUDENT IN AN ORGANIZED HEALTH CARE EDUCATION/TRAINING PROGRAM

## 2020-07-18 PROCEDURE — 82962 GLUCOSE BLOOD TEST: CPT

## 2020-07-18 PROCEDURE — 74011250636 HC RX REV CODE- 250/636: Performed by: STUDENT IN AN ORGANIZED HEALTH CARE EDUCATION/TRAINING PROGRAM

## 2020-07-18 PROCEDURE — 84100 ASSAY OF PHOSPHORUS: CPT

## 2020-07-18 PROCEDURE — 80048 BASIC METABOLIC PNL TOTAL CA: CPT

## 2020-07-18 PROCEDURE — 85025 COMPLETE CBC W/AUTO DIFF WBC: CPT

## 2020-07-18 PROCEDURE — 36415 COLL VENOUS BLD VENIPUNCTURE: CPT

## 2020-07-18 PROCEDURE — 83735 ASSAY OF MAGNESIUM: CPT

## 2020-07-18 RX ORDER — CEPHALEXIN 500 MG/1
500 CAPSULE ORAL 2 TIMES DAILY
Qty: 22 CAP | Refills: 0 | Status: SHIPPED | OUTPATIENT
Start: 2020-07-18 | End: 2020-07-29

## 2020-07-18 RX ORDER — CEPHALEXIN 250 MG/1
500 CAPSULE ORAL EVERY 12 HOURS
Status: DISCONTINUED | OUTPATIENT
Start: 2020-07-18 | End: 2020-07-18 | Stop reason: HOSPADM

## 2020-07-18 RX ORDER — CEPHALEXIN 500 MG/1
500 CAPSULE ORAL EVERY 12 HOURS
Qty: 22 CAP | Refills: 0 | Status: SHIPPED | OUTPATIENT
Start: 2020-07-18 | End: 2020-08-19 | Stop reason: CLARIF

## 2020-07-18 RX ADMIN — Medication 500 MG: at 08:29

## 2020-07-18 RX ADMIN — NEPHROCAP 1 CAPSULE: 1 CAP ORAL at 08:28

## 2020-07-18 RX ADMIN — CALCITRIOL CAPSULES 0.25 MCG 0.25 MCG: 0.25 CAPSULE ORAL at 08:35

## 2020-07-18 RX ADMIN — ACETAMINOPHEN 650 MG: 325 TABLET ORAL at 08:28

## 2020-07-18 RX ADMIN — CYANOCOBALAMIN TAB 1000 MCG 1000 MCG: 1000 TAB at 08:29

## 2020-07-18 RX ADMIN — SODIUM BICARBONATE 650 MG TABLET 1300 MG: at 08:28

## 2020-07-18 RX ADMIN — HEPARIN SODIUM 5000 UNITS: 5000 INJECTION INTRAVENOUS; SUBCUTANEOUS at 08:29

## 2020-07-18 RX ADMIN — PANTOPRAZOLE SODIUM 40 MG: 40 TABLET, DELAYED RELEASE ORAL at 08:27

## 2020-07-18 RX ADMIN — LEVOTHYROXINE SODIUM 125 MCG: 125 TABLET ORAL at 05:32

## 2020-07-18 RX ADMIN — SODIUM BICARBONATE 650 MG TABLET 1300 MG: at 01:18

## 2020-07-18 RX ADMIN — Medication 1 CAPSULE: at 08:28

## 2020-07-18 RX ADMIN — ALLOPURINOL 50 MG: 100 TABLET ORAL at 08:28

## 2020-07-18 RX ADMIN — TAMSULOSIN HYDROCHLORIDE 0.4 MG: 0.4 CAPSULE ORAL at 08:28

## 2020-07-18 RX ADMIN — CHOLECALCIFEROL TAB 25 MCG (1000 UNIT) 2 TABLET: 25 TAB at 08:29

## 2020-07-18 NOTE — PROGRESS NOTES
Discharge order noted for today. Pt has been accepted to [facility] agency. Met with patient  and are agreeable to the transition plan today. Transport has been arranged through family. Patient's discharge summary and home health  orders have been forwarded to [facility] home health  agency via [address] Updated bedside RN,  to the transition plan. Discharge information has been documented on the AVS. Patient already has rolling walker at home and [facility] that can be used as a 3 in one since shower chair not covered by insurance.         Britany Olson RN BSN  Care Manager  [phone]

## 2020-07-18 NOTE — PROGRESS NOTES
Patient returned from Interventional Radiology in stable condition. Right nephrostomy replaced. Dressing CDI and tube clamped. No distress noted, Patient denies pain.

## 2020-07-18 NOTE — PROGRESS NOTES
Progress Note      64y F with ESRD, HTN, h/o complex recurrent UTI, admitted for UTI, following for ESRD management. Subjective      Tolerated HD well yesterday   No difficulty in access cannulation   Tolerated UF about 1.5L. IMPRESSION:   ESRD, MWF  Access; left arm fistula, relatively deep, not able to cannulate successfully outpatient. Complicated UTI, nephrolithiasis has right nephrostomy tube,  Anemia , continue epogen. HTN   PLAN:   Her abx is changed to oral medication. Okay to discharge from renal stand point, resume outpatient MWF schedule. Adjust medication per renal function status.       Facility-Administered Medications: None       Current Facility-Administered Medications   Medication Dose Route Frequency    cephALEXin (KEFLEX) capsule 500 mg  500 mg Oral Q12H    morphine injection 1 mg  1 mg IntraVENous Q4H PRN    heparin (porcine) injection 5,000 Units  5,000 Units SubCUTAneous Q8H    0.9% sodium chloride infusion 250 mL  250 mL IntraVENous DIALYSIS PRN    epoetin caitlin-epbx (RETACRIT) injection 4,000 Units  4,000 Units SubCUTAneous Q MON, WED & FRI    acetaminophen (TYLENOL) tablet 650 mg  650 mg Oral BID    ascorbic acid (vitamin C) (VITAMIN C) tablet 500 mg  500 mg Oral DAILY    calcitRIOL (ROCALTROL) capsule 0.25 mcg  0.25 mcg Oral DAILY    cholecalciferol (VITAMIN D3) (1000 Units /25 mcg) tablet 2 Tab  2,000 Units Oral DAILY    cyanocobalamin tablet 1,000 mcg  1,000 mcg Oral DAILY    gabapentin (NEURONTIN) capsule 100 mg  100 mg Oral QHS    latanoprost (XALATAN) 0.005 % ophthalmic solution 1 Drop  1 Drop Both Eyes QHS    pantoprazole (PROTONIX) tablet 40 mg  40 mg Oral DAILY    ondansetron (ZOFRAN ODT) tablet 4 mg  4 mg Oral Q8H PRN    sodium bicarbonate tablet 1,300 mg  1,300 mg Oral TID    tamsulosin (FLOMAX) capsule 0.4 mg  0.4 mg Oral DAILY    B complex-vitaminC-folic acid (NEPHROCAP) cap  1 Cap Oral DAILY    levothyroxine (SYNTHROID) tablet 125 mcg  125 mcg Oral 6am    lactobacillus sp. 50 billion cpu (BIO-K PLUS) capsule 1 Cap  1 Cap Oral DAILY    allopurinoL (ZYLOPRIM) tablet 50 mg  50 mg Oral DAILY       Review of Systems:     Complete 10-point review of systems were obtained and discussed in length  with the patient. Complete review of systems was negative/unremarkable  except as mentioned in HPI section. Data Review:    Labs: Results:       Chemistry Recent Labs     07/18/20 0325 07/17/20 0518 07/16/20  0515   GLU 80 80 133*    141 140   K 4.4 3.7 3.4*    104 101   CO2 33* 29 30   BUN 23* 51* 47*   CREA 3.22* 5.97* 6.23*   CA 8.5 8.1* 8.2*   AGAP 4 8 9   BUCR 7* 9* 8*      CBC w/Diff Recent Labs     07/18/20 0325 07/17/20 0518 07/16/20 0515   WBC 6.0 6.0 8.1   RBC 2.40* 2.32* 2.38*   HGB 7.4* 7.1* 7.4*   HCT 23.6* 22.6* 23.4*    288 298   GRANS 54 48 63   LYMPH 33 38 25   EOS 2 3 1      Coagulation Recent Labs     07/16/20  1652   PTP 14.9   INR 1.2   APTT 46.9*       Iron/Ferritin No results for input(s): IRON in the last 72 hours. No lab exists for component: TIBCCALC   BNP No results for input(s): BNPP in the last 72 hours. Cardiac Enzymes No results for input(s): CPK, CKND1, PATTI in the last 72 hours. No lab exists for component: CKRMB, TROIP   Liver Enzymes No results for input(s): TP, ALB, TBIL, AP in the last 72 hours.     No lab exists for component: SGOT, GPT, DBIL   Thyroid Studies No results found for: T4, T3U, TSH, TSHEXT, TSHEXT      EKG: sinus     Physical Assessment:     Visit Vitals  /63 (BP 1 Location: Right arm, BP Patient Position: Sitting)   Pulse 92   Temp 97.6 °F (36.4 °C)   Resp 18   Ht 5' 2\" (1.575 m)   Wt 101.9 kg (224 lb 10.4 oz)   SpO2 98%   Breastfeeding No   BMI 41.09 kg/m²     Weight change:     Intake/Output Summary (Last 24 hours) at 7/18/2020 1404  Last data filed at 7/18/2020 1253  Gross per 24 hour   Intake 840 ml   Output 1901 ml   Net -1061 ml     Physical Exam:   General: comfortable, no acute distress   HEENT sclera anicteric, supple neck, no thyromegaly  CVS: S1S2 heard,  no rub  RS: + air entry b/l,   Abd: Soft, Non tender,   Neuro: non focal, awake, alert , CN II-XII are grossly intact  Extrm: ++edema, no cyanosis, clubbing   Skin: no visible  Rash  Musculoskeletal: No gross joints or bone deformities     Procedures/imaging: see electronic medical records for all procedures, Xrays and details which were not copied into this note but were reviewed prior to creation of Plan      Discussed with primary team.       Natasha Griffith MD  July 18, 2020  [facility] Nephrology  Office [phone]

## 2020-07-18 NOTE — PROGRESS NOTES
Patient returned from dialysis in stable condition. Patient denies pain. Nephrostomy tube leaking. Dressing changed and attempted to hook to leg bag to gravity per [facility]. Leg bag did not fit opening. Paged IR department but they had gone for the day. Paged [facility] again and was told it needed to be draining to gravity. Patient called her son to bring her a bag from home that fit nephrostomy tube correctly. Mckinley Olsen

## 2020-07-18 NOTE — PROGRESS NOTES
Nephrostomy tube to gravity with new leg bag. Dressing reinforced to right flank. Assisted patient back to bed from recliner. No complaints voiced. Denies pain.

## 2020-07-18 NOTE — PROGRESS NOTES
Bedside and Verbal shift change report given to Jessica Aguilar RN (oncoming nurse) by Selin May RN (offgoing nurse). Report included the following information SBAR, Kardex, OR Summary, Procedure Summary, Intake/Output and MAR.

## 2020-07-18 NOTE — PROGRESS NOTES
[facility] Medicine  Progress Note    Patient: Verona Bill MRN: [id number]   SSN: [id number]  YOB: 1945   Age: 66 y.o. Sex: female      Admit Date: 7/15/2020    LOS: 3 days   Chief Complaint   Patient presents with    Urinary Pain       Subjective:     Patient states that she is feeling overall very well this AM. She did have soft BPs this AM, but did not feel symptomatic per the patient. She states she was not dizzy, lightheaded, or feel as though she might faint, and was able to get up with assist to the bathroom and back without issue. She has been feeling stronger, and able to get up with supervision, and walk while supervised. She was seen this morning comfortably laying in her bed, and was well with no issues or concerns. Patient is COVID negative. Patient feels she can go home without home health, and expressed the desire to do so. Review of Systems   Constitutional: Negative for chills, fever and malaise/fatigue. Respiratory: Negative for cough, shortness of breath and wheezing. Cardiovascular: Negative for chest pain and palpitations. Gastrointestinal: Negative for abdominal pain. Genitourinary: Positive for frequency and urgency. Incontinence   Neurological: Negative for dizziness, loss of consciousness, weakness and headaches. All other systems reviewed and are negative. Objective:     Visit Vitals  /66   Pulse 80   Temp 98.2 °F (36.8 °C)   Resp 16   Ht 5' 2\" (1.575 m)   Wt 101.9 kg (224 lb 10.4 oz)   SpO2 98%   Breastfeeding No   BMI 41.09 kg/m²       Physical Exam:   Con: NAD, well developed, obese, laying in bed comfortably. CV: RRR, normal S1, S2, no murmurs  Resp: CTAB  Abd: no pain on palpation on right mid abd, not tender otherwise, not distended, soft. Back: nephrostomy tube bandaged and clean, drainage bag connected, light yellow and clear looking. Intake and Output:  Current Shift: No intake/output data recorded.   Last three shifts: 07/16 1901 - 07/18 0700  In: 1 [P.O.:960; I.V.:10]  Out: 3051 [Urine:1551]    Lab/Data Review:  Recent Results (from the past 12 hour(s))   GLUCOSE, POC    Collection Time: 07/17/20  9:17 PM   Result Value Ref Range    Glucose (POC) 177 (H) 70 - 302 mg/dL   METABOLIC PANEL, BASIC    Collection Time: 07/18/20  3:25 AM   Result Value Ref Range    Sodium 137 136 - 145 mmol/L    Potassium 4.4 3.5 - 5.5 mmol/L    Chloride 100 100 - 111 mmol/L    CO2 33 (H) 21 - 32 mmol/L    Anion gap 4 3.0 - 18 mmol/L    Glucose 80 74 - 99 mg/dL    BUN 23 (H) 7.0 - 18 MG/DL    Creatinine 3.22 (H) 0.6 - 1.3 MG/DL    BUN/Creatinine ratio 7 (L) 12 - 20      GFR est AA 17 (L) >60 ml/min/1.73m2    GFR est non-AA 14 (L) >60 ml/min/1.73m2    Calcium 8.5 8.5 - 10.1 MG/DL   CBC WITH AUTOMATED DIFF    Collection Time: 07/18/20  3:25 AM   Result Value Ref Range    WBC 6.0 4.6 - 13.2 K/uL    RBC 2.40 (L) 4.20 - 5.30 M/uL    HGB 7.4 (L) 12.0 - 16.0 g/dL    HCT 23.6 (L) 35.0 - 45.0 %    MCV 98.3 (H) 74.0 - 97.0 FL    MCH 30.8 24.0 - 34.0 PG    MCHC 31.4 31.0 - 37.0 g/dL    RDW 14.4 11.6 - 14.5 %    PLATELET 782 166 - 621 K/uL    MPV 9.6 9.2 - 11.8 FL    NEUTROPHILS 54 40 - 73 %    LYMPHOCYTES 33 21 - 52 %    MONOCYTES 11 (H) 3 - 10 %    EOSINOPHILS 2 0 - 5 %    BASOPHILS 0 0 - 2 %    ABS. NEUTROPHILS 3.2 1.8 - 8.0 K/UL    ABS. LYMPHOCYTES 2.0 0.9 - 3.6 K/UL    ABS. MONOCYTES 0.7 0.05 - 1.2 K/UL    ABS. EOSINOPHILS 0.1 0.0 - 0.4 K/UL    ABS.  BASOPHILS 0.0 0.0 - 0.1 K/UL    DF AUTOMATED     MAGNESIUM    Collection Time: 07/18/20  3:25 AM   Result Value Ref Range    Magnesium 2.1 1.6 - 2.6 mg/dL   PHOSPHORUS    Collection Time: 07/18/20  3:25 AM   Result Value Ref Range    Phosphorus 3.3 2.5 - 4.9 MG/DL   GLUCOSE, POC    Collection Time: 07/18/20  5:00 AM   Result Value Ref Range    Glucose (POC) 106 70 - 110 mg/dL       RECENT RESULTS  MODALITY IMPRESSION   XR Results from [place] encounter on 06/23/20   XR CHEST PA LAT    Narrative CHEST PA AND LATERAL:    INDICATIONS: Renal failure beginning dialysis. COMPARISONS: May 15, 2020. FINDINGS:     Two views are obtained . Lungs: Clear. Cardiac Silhouette And Mediastinal Contours: Normal.    Pleural Spaces: No pneumothorax or pleural effusion evident. Bones And Soft Tissues: Unremarkable for age. Impression IMPRESSION:    No acute or active disease evident. CT Results from [place] encounter on 07/15/20   CT ABD PELV WO CONT    Narrative CT ABD PELV WO CONT    HISTORY: Right nephroureterostomy. Dysuria. COMPARISON: Noncontrast abdomen pelvic CT 5/15/2020, 2/20/2020. Dennis Antu PROCEDURE: Noncontrast renal stone CT was performed. Iterative techniques for  dose savings. Cor/Sag reconstructions performed. FINDINGS:     LUNG BASES: clear without pleural or pericardial effusion; 2 x 5 mm right  Fissural nodule. Moderate coronary calcium burden. Elevated eventrated right  hemidiaphragm. LIVER: unopacified shows no mass or intrahepatic ductal dilation;    PANCREAS: normal appearance without mass, adenopathy, or peripancreatic free  fluid;    SPLEEN: normal appearance without enlargement, perisplenic collaterals, or  adenopathy; splenule. GALLBLADDER: Surgically absent;    ADRENALS: normal size and shape, no calcifications;    KIDNEYS: Lobulated atrophic left kidney, with multifocal cortical cysts, overall  unchanged. No hydronephrosis or stones. Right kidney shows nephroureterostomy. There is a loop formed in the right renal  pelvis. The right kidney does show some punctate foci of air in cortical cyst.  There is no perinephric fat stranding or stone.  The right ureteral branches  normally located and coils in the bladder.;    AORTA: Normal size without significant plaque or periaortic fluid;    RETROPERITONEUM: No adenopathy or fat stranding;    BOWEL/MESENTERY: Normal size and distribution, no distention, misting,  adenopathy, or hernia; gastric bypass changes. Sigmoid diverticulosis. APPENDIX: is not inflamed;    PELVIS: No ascites, adenopathy, hernia, or focal inflammatory process; Urinary  Bladder is unremarkable except for air in the nondependent portion. Uterus is  absent. .    OSSEOUS: Multifocal degenerative changes throughout the thoracolumbar spine. No  definite compression injury. ;      Impression IMPRESSION:     1. Stable right nephroureterostomy without increasing hydronephrosis on the  stone. .  2. No secondary evidence of mass, bowel obstruction, ascites, hernia, or focal  inflammatory process. 3. Bilateral renal cystic disease. .  4. Sigmoid diverticulosis without diverticulitis. MRI No results found for this or any previous visit. ULTRASOUND No results found for this or any previous visit.      Cardiology Procedures/Testing:  MODALITY RESULTS   EKG Results for orders placed or performed during the hospital encounter of 05/15/20   EKG, 12 LEAD, INITIAL   Result Value Ref Range    Ventricular Rate 72 BPM    Atrial Rate 36 BPM    QRS Duration 80 ms    Q-T Interval 378 ms    QTC Calculation (Bezet) 413 ms    Calculated R Axis -20 degrees    Diagnosis       Accelerated Junctional rhythm  Nonspecific ST abnormality  Abnormal ECG  When compared with ECG of 16-JAN-2020 16:20,  Nonspecific T wave abnormality, worse in Inferior leads  Confirmed by Gibson Muñoz ([id number]) on 5/16/2020 1:41:51 PM         ECHO       Special Testing/Procedures:  MODALITY RESULTS   MICRO All Micro Results     Procedure Component Value Units Date/Time    CULTURE, BLOOD [[id number]] Collected:  07/15/20 1830    Order Status:  Completed Specimen:  Blood Updated:  07/18/20 0643     Special Requests: NO SPECIAL REQUESTS        Culture result: NO GROWTH 3 DAYS       CULTURE, BLOOD [[id number]] Collected:  07/15/20 1845    Order Status:  Completed Specimen:  Blood Updated:  07/18/20 0643     Special Requests: NO SPECIAL REQUESTS        Culture result: NO GROWTH 3 DAYS CULTURE, MRSA [[id number]] Collected:  07/16/20 1742    Order Status:  Completed Specimen:  Nasal from Nares Updated:  07/17/20 1811     Special Requests: NO SPECIAL REQUESTS        Culture result: MRSA NOT PRESENT                   Screening of patient nares for MRSA is for surveillance purposes and, if positive, to facilitate isolation considerations in high risk settings. It is not intended for automatic decolonization interventions per se as regimens are not sufficiently effective to warrant routine use. CULTURE, URINE [[id number]] Collected:  07/17/20 0845    Order Status:  Completed Specimen:  Urine from Nephrostomy Updated:  07/17/20 1649    CULTURE, URINE [[id number]]     Order Status:  Canceled Specimen:  Urine from Nephrostomy     CULTURE, URINE [[id number]]  (Abnormal) Collected:  07/15/20 1220    Order Status:  Completed Specimen:  Clean catch Updated:  07/16/20 1519     Special Requests: NO SPECIAL REQUESTS        [place] Count --        >100,000  COLONIES/mL       Culture result: GRAM NEGATIVE RODS            UA No results found for this or any previous visit. PATH None     Telemetry YES   Oxygen NONE     Assessment and Plan:   66 y.o. female with PMH CKD5, acquired hypothyroidism, GERD, T2DM, HTN, HLD,history of recurrent nephrolithiasis, now with a nephrostomy tube(6/20), Urinary incontinence,  controlled with diet, Anemia of chronic renal failure, now presenting with complaint of progressive weakness, difficulty standing, and pain in the right flank and back.     1. Complicated UTI 2/2 nephrostomy tube/nephrolithiasis, with history of recurrent UTI. Patient presented with 3 days of weakness, Right sided flank pain, his a history of recurrent UTI, stones, and nephrostomy and stent for stone. Also Hx of strictures, severe hydronephrosis. No leucocytosis in ED, CT unimpressive for infection, tachycardic in the ED. Vitals stable otherwise.  UA with too numerous WBC, Large Leucocyte esterase, (-) nitrites, moderate blood. Patient has since had Nephrostomy tube changed, with significant infected seeming urine draining and has since seemed clean.                  -continue cefepime, appreciate ID recs              -FU new Urine culture from nephrostomy tube, prev shows E. Coli              -continue home allopurinol, flomax, probiotics              -Daily bmp, cbc              -PT/OT   -Good to D/c medically, will wait on eval by ID.      CKD5/ESRD  On dialysis twice weekly. Cr elevated 6.02>5.97>3.22 with baseline approx 3.5. Last dialysis 7/18              -nephrology on board, appreciate their assistance              -Renal dose medication              -Trend Cr, K, BUN, mag, phos   -Replete as needed     Anemia/Anemia of chronic disease/kidney failure, 2/2 ESRD  Currently HGB 7.9 on admit, down from month ago at 9.2. ast iron study 5/8/2020: Iron 72, TIBC 263, Iron saturation 27, Ferritin 70. HgB 7.9 on admission, no signs of active bleeding. 7.4This AM              -likely part of ESRD              -Monitor daily cbc              -Defer to nephrology for dialysis (M, F) and related anemia injections and treatments              -consider blood transfusion if HGB <7.0     Chronic Metabolic Acidosis likely 2/2 to her ESRD  - Continue on home NaHCO2 650mg two tablets TID     Hx of HTN              -previously on amlodipine and coreg, but stopped due to low BP.               -monitor BP     Hx of T2DM, peripheral neuropathy              -Last HbA1c <3.5   -<3.8 this admit              -Taking Gabapentin, 100mg QHs              -Monitor via BMP, no need for accucheck at this time     GERD              -On omeprazole at home              -Protonix 40mg every day     Hypothyroidism              -Continue synthroid 125mcg, home dose     Glaucoma              -Continue home meds/eye drops    Diet Renal   DVT Prophylaxis SQH, held for procedure   GI Prophylaxis Protonix   Code status DNR. DNI   Disposition DC soon [facility]  Relationship: Son  [phone]     Yoanna Hannah MD, PGY-1   [address] Medicine   Intern Pager: [phone]   July 18, 2020, 8:27 AM

## 2020-07-18 NOTE — PROGRESS NOTES
"Subjective:      Delonte Alexander is a 79 y.o. male who presents with Nail Problem and Fall          Pt is 78 y/o male who presents with right toe nail injury after pulling off his sock and it getting hooked on it- 3 days ago. He reports hx of fungus for \"years on the toenail\" however it never bothered him. He noticed that the toenail began to get a little loose and then with the sock pulling it up- it started to bother him and bleed a little. He denies any medical hx. Other than HTN. He does report some intermittent back pain, but \"nothing more than he can handle\", as he is mainly concerned about his toenail today. Denies any hx of DMII.     Nail Problem  This is a new problem. Episode onset: 3 days ago. The problem occurs constantly. The problem has been unchanged. Pertinent negatives include no abdominal pain, change in bowel habit, chest pain, chills, congestion, coughing, headaches, joint swelling, myalgias or neck pain. Nothing aggravates the symptoms. He has tried nothing for the symptoms.     Review of Systems   Constitutional: Negative for chills and malaise/fatigue.   HENT: Negative for congestion.    Respiratory: Negative for cough.    Cardiovascular: Negative for chest pain.   Gastrointestinal: Negative for abdominal pain and change in bowel habit.   Musculoskeletal: Positive for back pain. Negative for myalgias, joint pain, joint swelling and neck pain.   Neurological: Negative for headaches.          Objective:     /70 mmHg  Pulse 68  Temp(Src) 36.9 °C (98.4 °F)  Resp 16  Ht 1.854 m (6' 1\")  Wt 92.987 kg (205 lb)  BMI 27.05 kg/m2  SpO2 96%   PMH:  has no past medical history on file.  MEDS:   Current outpatient prescriptions:   •  enalapril (VASOTEC) 2.5 MG Tab, Take 2.5 mg by mouth every day., Disp: , Rfl:   ALLERGIES: No Known Allergies  SURGHX: No past surgical history on file.  SOCHX:    FH: Family history was reviewed, no pertinent findings to report     Physical Exam   Constitutional: He " Infectious Disease Progress Note        Reason: PCN tube associated UTI    Current abx Prior abx   Ceftriaxone - Cefepime 7/15-     Assessment :  UTI-> E coli  Hx of recurrent UTI/stones  --s/p Rt nephrostomy tube (since 2018)  --Last cx growing E coli  --Last stent change 2020 from what I can see; now exchanged 2020    ESRD, new dialysis    Generalized weakness    Anemia    Cipro allergy    Recommendations:  --Now s/p stent exchange. Growing E coli, sensitive to many options. D/c IV Abx and begin oral Keflex x 11 more days. Modified orders to reflect this. D/w patient. Return for problems. --Please re-call our service for additional questions or concerns. HPI:  Pt reports that she continues to feel well today  Denies n/v/diarrhea  No pain today    Allergies: Ciprofloxacin and Statins-hmg-coa reductase inhibitors  Temp (24hrs), Av.8 °F (36.6 °C), Min:97.3 °F (36.3 °C), Max:98.2 °F (36.8 °C)    Visit Vitals  /63 (BP 1 Location: Right arm, BP Patient Position: Sitting)   Pulse 92   Temp 97.6 °F (36.4 °C)   Resp 18   Ht 5' 2\" (1.575 m)   Wt 101.9 kg (224 lb 10.4 oz)   SpO2 98%   Breastfeeding No   BMI 41.09 kg/m²       ROS: 12 point ROS obtained in details. Pertinent positives as mentioned in HPI,   otherwise negative    Physical Exam:  General: Well developed, well nourished female sitting up in chair, in no acute distress.   HEENT: EOMI, anicteric sclera, no oral lesions or thrush  PULM: speaking comfortably ill full sentences on RA  EXT: no edema b/l LE  SKIN: no acute rash on limited skin exam, some ecchymoses to the LUE  NEURO: A&O x 4, nonfocal      Labs: Results:   Chemistry Recent Labs     20  0325 20  0518 20  0515   GLU 80 80 133*    141 140   K 4.4 3.7 3.4*    104 101   CO2 33* 29 30   BUN 23* 51* 47*   CREA 3.22* 5.97* 6.23*   CA 8.5 8.1* 8.2*   AGAP 4 8 9   BUCR 7* 9* 8*      CBC w/Diff Recent Labs     20  0325 20  0518 07/16/20  0515   WBC 6.0 6.0 8.1   RBC 2.40* 2.32* 2.38*   HGB 7.4* 7.1* 7.4*   HCT 23.6* 22.6* 23.4*    288 298   GRANS 54 48 63   LYMPH 33 38 25   EOS 2 3 1      Microbiology Recent Labs     07/17/20  0845 07/16/20  1742 07/15/20  1845 07/15/20  1830   CULT No growth (<1,000 CFU/ML) MRSA NOT PRESENT      Screening of patient nares for MRSA is for surveillance purposes and, if positive, to facilitate isolation considerations in high risk settings. It is not intended for automatic decolonization interventions per se as regimens are not sufficiently effective to warrant routine use.  NO GROWTH 3 DAYS NO GROWTH 3 DAYS          RADIOLOGY:  Reviewed, none new    Dr. Kashmir Guerin, Infectious Disease Specialist  July 18, 2020 is oriented to person, place, and time. He appears well-developed and well-nourished.   HENT:   Head: Normocephalic and atraumatic.   Eyes: EOM are normal. Pupils are equal, round, and reactive to light.   Neck: Normal range of motion. Neck supple.   Cardiovascular: Normal rate.    Pulmonary/Chest: Effort normal. No respiratory distress.   Neurological: He is alert and oriented to person, place, and time.   Skin:        Right toe nail- only the proximal nail bed is attached. Diffuse old blood noted- minimal tenderness. Without active bleeding or drainage. Digit block was conducted in sterile procedure with 2% without lidocaine.   Nail was removed with hemostats today- bleeding resolved with pressure. Polysporin was applied, wrapped in pressure bandage. Pt. Tolerated well.   Of note distal pulses 2+.    Psychiatric: He has a normal mood and affect. His behavior is normal.   Vitals reviewed.              Assessment/Plan:     1. Injury of nail bed of toe    Pt. Tolerated nail removal well. Wound care discussed. RTC in 1-2 days for wound check.   Keep dry, change dressing daily, wear soft shoes.   Suspect well healing- as pt. Had bounding distal pulses- without hx of DMII.     .

## 2020-07-20 ENCOUNTER — HOME CARE VISIT (OUTPATIENT)
Dept: HOME HEALTH SERVICES | Facility: HOME HEALTH | Age: 75
End: 2020-07-20
Payer: MEDICARE

## 2020-07-20 ENCOUNTER — HOME CARE VISIT (OUTPATIENT)
Dept: SCHEDULING | Facility: HOME HEALTH | Age: 75
End: 2020-07-20
Payer: MEDICARE

## 2020-07-20 ENCOUNTER — PATIENT OUTREACH (OUTPATIENT)
Dept: CASE MANAGEMENT | Age: 75
End: 2020-07-20

## 2020-07-20 PROCEDURE — G0299 HHS/HOSPICE OF RN EA 15 MIN: HCPCS

## 2020-07-20 PROCEDURE — 400013 HH SOC

## 2020-07-20 NOTE — PROGRESS NOTES
Patient contacted regarding recent discharge and COVID-19 risk. Discussed COVID-19 related testing which was available at this time. Test results were negative. Patient informed of results, if available? yes      Care Transition Nurse/ Ambulatory Care Manager contacted the family by telephone to perform post discharge assessment. Verified name and  with family as identifiers. Patient has following risk factors of: diabetes and chronic kidney disease. CTN/ACM reviewed discharge instructions, medical action plan and red flags related to discharge diagnosis. Reviewed and educated them on any new and changed medications related to discharge diagnosis. Advised obtaining a 90-day supply of all daily and as-needed medications. Education provided regarding infection prevention, and signs and symptoms of COVID-19 and when to seek medical attention with patient who verbalized understanding. Discussed exposure protocols and quarantine from [address] you at higher risk for severe illness  and given an opportunity for questions and concerns. The patient agrees to contact the COVID-19 hotline [phone] or PCP office for questions related to their healthcare. CTN/ACM provided contact information for future reference. From CDC: Are you at higher risk for severe illness?  Wash your hands often.  Avoid close contact (6 feet, which is about two arm lengths) with people who are sick.  Put distance between yourself and other people if COVID-19 is spreading in your community.  Clean and disinfect frequently touched surfaces.  Avoid all cruise travel and non-essential air travel.  Call your healthcare professional if you have concerns about COVID-19 and your underlying condition or if you are sick.     For more information on steps you can take to protect yourself, see CDC's How to Protect Yourself      Patient/family/caregiver given information for Vivek Cooper and agrees to enroll yes  Patient's preferred e-mail:  [email or web address]  Patient's preferred phone number: [phone]  Based on Loop alert triggers, patient will be contacted by nurse care manager for worsening symptoms. Pt will be further monitored by COVID Loop Team based on severity of symptoms and risk factors.

## 2020-07-21 ENCOUNTER — HOME CARE VISIT (OUTPATIENT)
Dept: HOME HEALTH SERVICES | Facility: HOME HEALTH | Age: 75
End: 2020-07-21
Payer: MEDICARE

## 2020-07-21 PROCEDURE — A4452 WATERPROOF TAPE: HCPCS

## 2020-07-21 PROCEDURE — A4216 STERILE WATER/SALINE, 10 ML: HCPCS

## 2020-07-21 PROCEDURE — A9999 DME SUPPLY OR ACCESSORY, NOS: HCPCS

## 2020-07-21 PROCEDURE — A6402 STERILE GAUZE <= 16 SQ IN: HCPCS

## 2020-07-23 ENCOUNTER — HOME CARE VISIT (OUTPATIENT)
Dept: SCHEDULING | Facility: HOME HEALTH | Age: 75
End: 2020-07-23
Payer: MEDICARE

## 2020-07-23 VITALS
OXYGEN SATURATION: 98 % | HEART RATE: 101 BPM | TEMPERATURE: 98 F | RESPIRATION RATE: 20 BRPM | DIASTOLIC BLOOD PRESSURE: 56 MMHG | SYSTOLIC BLOOD PRESSURE: 100 MMHG

## 2020-07-23 VITALS
OXYGEN SATURATION: 97 % | RESPIRATION RATE: 18 BRPM | TEMPERATURE: 98.4 F | HEART RATE: 102 BPM | SYSTOLIC BLOOD PRESSURE: 110 MMHG | DIASTOLIC BLOOD PRESSURE: 54 MMHG

## 2020-07-23 PROCEDURE — G0151 HHCP-SERV OF PT,EA 15 MIN: HCPCS

## 2020-07-23 PROCEDURE — G0152 HHCP-SERV OF OT,EA 15 MIN: HCPCS

## 2020-07-24 ENCOUNTER — HOSPITAL ENCOUNTER (OUTPATIENT)
Dept: INFUSION THERAPY | Age: 75
End: 2020-07-24

## 2020-07-24 ENCOUNTER — HOME CARE VISIT (OUTPATIENT)
Dept: SCHEDULING | Facility: HOME HEALTH | Age: 75
End: 2020-07-24
Payer: MEDICARE

## 2020-07-24 VITALS
HEART RATE: 100 BPM | TEMPERATURE: 98.1 F | DIASTOLIC BLOOD PRESSURE: 62 MMHG | OXYGEN SATURATION: 97 % | RESPIRATION RATE: 17 BRPM | SYSTOLIC BLOOD PRESSURE: 103 MMHG

## 2020-07-24 PROCEDURE — G0300 HHS/HOSPICE OF LPN EA 15 MIN: HCPCS

## 2020-07-25 VITALS
RESPIRATION RATE: 22 BRPM | TEMPERATURE: 97.8 F | SYSTOLIC BLOOD PRESSURE: 100 MMHG | OXYGEN SATURATION: 91 % | DIASTOLIC BLOOD PRESSURE: 52 MMHG | HEART RATE: 100 BPM

## 2020-07-25 NOTE — PROGRESS NOTES
Therapy Functional Score Assessment  Question   Score   Grooming  2       Upper Dressing 2      Lower Dressing 2      Bathing  5      Toilet Transfer  1    Transfer  2      JP690x Mobility  Lying to sitting on side of bed  SOC 4  Goal    6          Ambulation  3   Dyspnea                     2       Pain Interfering with activity 3  Est number therapy visits      10

## 2020-07-28 ENCOUNTER — HOME CARE VISIT (OUTPATIENT)
Dept: SCHEDULING | Facility: HOME HEALTH | Age: 75
End: 2020-07-28
Payer: MEDICARE

## 2020-07-28 VITALS
TEMPERATURE: 97.7 F | HEART RATE: 102 BPM | DIASTOLIC BLOOD PRESSURE: 59 MMHG | SYSTOLIC BLOOD PRESSURE: 118 MMHG | RESPIRATION RATE: 13 BRPM | OXYGEN SATURATION: 96 %

## 2020-07-28 PROCEDURE — G0157 HHC PT ASSISTANT EA 15: HCPCS

## 2020-07-29 ENCOUNTER — HOME CARE VISIT (OUTPATIENT)
Dept: HOME HEALTH SERVICES | Facility: HOME HEALTH | Age: 75
End: 2020-07-29
Payer: MEDICARE

## 2020-07-30 ENCOUNTER — HOME CARE VISIT (OUTPATIENT)
Dept: SCHEDULING | Facility: HOME HEALTH | Age: 75
End: 2020-07-30
Payer: MEDICARE

## 2020-07-30 PROCEDURE — G0300 HHS/HOSPICE OF LPN EA 15 MIN: HCPCS

## 2020-07-31 ENCOUNTER — HOME CARE VISIT (OUTPATIENT)
Dept: SCHEDULING | Facility: HOME HEALTH | Age: 75
End: 2020-07-31
Payer: MEDICARE

## 2020-07-31 ENCOUNTER — HOME CARE VISIT (OUTPATIENT)
Dept: HOME HEALTH SERVICES | Facility: HOME HEALTH | Age: 75
End: 2020-07-31
Payer: MEDICARE

## 2020-07-31 DIAGNOSIS — D63.1 ANEMIA OF CHRONIC RENAL FAILURE, UNSPECIFIED CKD STAGE: ICD-10-CM

## 2020-07-31 DIAGNOSIS — N18.9 ANEMIA OF CHRONIC RENAL FAILURE, UNSPECIFIED CKD STAGE: ICD-10-CM

## 2020-08-01 ENCOUNTER — HOME CARE VISIT (OUTPATIENT)
Dept: SCHEDULING | Facility: HOME HEALTH | Age: 75
End: 2020-08-01
Payer: MEDICARE

## 2020-08-01 VITALS
DIASTOLIC BLOOD PRESSURE: 72 MMHG | SYSTOLIC BLOOD PRESSURE: 112 MMHG | HEART RATE: 99 BPM | TEMPERATURE: 98.6 F | OXYGEN SATURATION: 96 % | RESPIRATION RATE: 13 BRPM

## 2020-08-01 PROCEDURE — G0157 HHC PT ASSISTANT EA 15: HCPCS

## 2020-08-03 ENCOUNTER — HOME CARE VISIT (OUTPATIENT)
Dept: HOME HEALTH SERVICES | Facility: HOME HEALTH | Age: 75
End: 2020-08-03
Payer: MEDICARE

## 2020-08-03 VITALS
SYSTOLIC BLOOD PRESSURE: 132 MMHG | OXYGEN SATURATION: 100 % | DIASTOLIC BLOOD PRESSURE: 82 MMHG | TEMPERATURE: 98.7 F | HEART RATE: 88 BPM

## 2020-08-05 ENCOUNTER — HOME CARE VISIT (OUTPATIENT)
Dept: SCHEDULING | Facility: HOME HEALTH | Age: 75
End: 2020-08-05
Payer: MEDICARE

## 2020-08-05 VITALS
SYSTOLIC BLOOD PRESSURE: 88 MMHG | DIASTOLIC BLOOD PRESSURE: 56 MMHG | OXYGEN SATURATION: 98 % | TEMPERATURE: 97.7 F | HEART RATE: 96 BPM

## 2020-08-05 PROCEDURE — G0152 HHCP-SERV OF OT,EA 15 MIN: HCPCS

## 2020-08-05 PROCEDURE — G0157 HHC PT ASSISTANT EA 15: HCPCS

## 2020-08-05 NOTE — Clinical Note
Will do.      ----- Message -----  From: Justa Hartley PTA  Sent: 8/6/2020   9:59 PM EDT  To: Rick Mccauley PTA      Pt reports that she is going to use an [employer] to get to HD, but doesn't know if she can do it by herself. We worked on how to get out the front door, down & up the stairs w a folded RW, but pt stated that she will probably come in the back door bc of the dogs. Please address that w her on Sat.

## 2020-08-06 VITALS
HEART RATE: 101 BPM | RESPIRATION RATE: 17 BRPM | DIASTOLIC BLOOD PRESSURE: 70 MMHG | TEMPERATURE: 97.9 F | SYSTOLIC BLOOD PRESSURE: 92 MMHG | OXYGEN SATURATION: 97 %

## 2020-08-07 ENCOUNTER — HOME CARE VISIT (OUTPATIENT)
Dept: SCHEDULING | Facility: HOME HEALTH | Age: 75
End: 2020-08-07
Payer: MEDICARE

## 2020-08-07 VITALS
HEART RATE: 78 BPM | DIASTOLIC BLOOD PRESSURE: 88 MMHG | SYSTOLIC BLOOD PRESSURE: 138 MMHG | OXYGEN SATURATION: 98 % | TEMPERATURE: 97.8 F

## 2020-08-07 PROCEDURE — G0300 HHS/HOSPICE OF LPN EA 15 MIN: HCPCS

## 2020-08-07 NOTE — PROGRESS NOTES
Pt reports that she is going to use an [employer] to get to HD, but doesn't know if she can do it by herself. We worked on how to get out the front door, down & up the stairs w a folded RW, but pt stated that she will probably come in the back door bc of the dogs. Please address that w her on Sat.

## 2020-08-08 ENCOUNTER — HOME CARE VISIT (OUTPATIENT)
Dept: SCHEDULING | Facility: HOME HEALTH | Age: 75
End: 2020-08-08
Payer: MEDICARE

## 2020-08-08 PROCEDURE — G0157 HHC PT ASSISTANT EA 15: HCPCS

## 2020-08-09 VITALS
TEMPERATURE: 98 F | HEART RATE: 107 BPM | RESPIRATION RATE: 13 BRPM | DIASTOLIC BLOOD PRESSURE: 70 MMHG | SYSTOLIC BLOOD PRESSURE: 102 MMHG | OXYGEN SATURATION: 96 %

## 2020-08-13 ENCOUNTER — HOME CARE VISIT (OUTPATIENT)
Dept: HOME HEALTH SERVICES | Facility: HOME HEALTH | Age: 75
End: 2020-08-13
Payer: MEDICARE

## 2020-08-13 NOTE — PROGRESS NOTES
Spoke w pt's son about [employer] company's denial of further authorization for [facility]. Mr. Delbra Jeans stated that he would speak w the insurance company directly.

## 2020-08-14 ENCOUNTER — HOME CARE VISIT (OUTPATIENT)
Dept: SCHEDULING | Facility: HOME HEALTH | Age: 75
End: 2020-08-14
Payer: MEDICARE

## 2020-08-14 ENCOUNTER — HOME CARE VISIT (OUTPATIENT)
Dept: HOME HEALTH SERVICES | Facility: HOME HEALTH | Age: 75
End: 2020-08-14
Payer: MEDICARE

## 2020-08-14 DIAGNOSIS — D63.1 ANEMIA OF CHRONIC RENAL FAILURE, UNSPECIFIED CKD STAGE: ICD-10-CM

## 2020-08-14 DIAGNOSIS — N18.9 ANEMIA OF CHRONIC RENAL FAILURE, UNSPECIFIED CKD STAGE: ICD-10-CM

## 2020-08-15 ENCOUNTER — HOME CARE VISIT (OUTPATIENT)
Dept: HOME HEALTH SERVICES | Facility: HOME HEALTH | Age: 75
End: 2020-08-15
Payer: MEDICARE

## 2020-08-19 ENCOUNTER — HOSPITAL ENCOUNTER (EMERGENCY)
Age: 75
Discharge: HOME | End: 2020-08-20
Attending: EMERGENCY MEDICINE
Payer: MEDICARE

## 2020-08-19 VITALS
SYSTOLIC BLOOD PRESSURE: 118 MMHG | TEMPERATURE: 98.3 F | RESPIRATION RATE: 16 BRPM | HEIGHT: 62 IN | WEIGHT: 222.66 LBS | HEART RATE: 94 BPM | BODY MASS INDEX: 40.98 KG/M2 | DIASTOLIC BLOOD PRESSURE: 55 MMHG | OXYGEN SATURATION: 96 %

## 2020-08-19 DIAGNOSIS — R82.90 FOUL SMELLING URINE: Primary | ICD-10-CM

## 2020-08-19 LAB
APPEARANCE UR: ABNORMAL
BACTERIA URNS QL MICRO: ABNORMAL /HPF
BASOPHILS # BLD: 0 K/UL (ref 0–0.1)
BASOPHILS NFR BLD: 1 % (ref 0–2)
BILIRUB UR QL: NEGATIVE
COLOR UR: ABNORMAL
DIFFERENTIAL METHOD BLD: ABNORMAL
EOSINOPHIL # BLD: 0.2 K/UL (ref 0–0.4)
EOSINOPHIL NFR BLD: 3 % (ref 0–5)
EPITH CASTS URNS QL MICRO: ABNORMAL /LPF (ref 0–5)
ERYTHROCYTE [DISTWIDTH] IN BLOOD BY AUTOMATED COUNT: 15.4 % (ref 11.6–14.5)
GLUCOSE UR STRIP.AUTO-MCNC: NEGATIVE MG/DL
HCT VFR BLD AUTO: 30.2 % (ref 35–45)
HGB BLD-MCNC: 9.1 G/DL (ref 12–16)
HGB UR QL STRIP: ABNORMAL
KETONES UR QL STRIP.AUTO: NEGATIVE MG/DL
LEUKOCYTE ESTERASE UR QL STRIP.AUTO: ABNORMAL
LYMPHOCYTES # BLD: 2.7 K/UL (ref 0.9–3.6)
LYMPHOCYTES NFR BLD: 36 % (ref 21–52)
MCH RBC QN AUTO: 30.2 PG (ref 24–34)
MCHC RBC AUTO-ENTMCNC: 30.1 G/DL (ref 31–37)
MCV RBC AUTO: 100.3 FL (ref 74–97)
MONOCYTES # BLD: 0.6 K/UL (ref 0.05–1.2)
MONOCYTES NFR BLD: 8 % (ref 3–10)
NEUTS SEG # BLD: 4 K/UL (ref 1.8–8)
NEUTS SEG NFR BLD: 52 % (ref 40–73)
NITRITE UR QL STRIP.AUTO: NEGATIVE
PH UR STRIP: 6.5 [PH] (ref 5–8)
PLATELET # BLD AUTO: 272 K/UL (ref 135–420)
PMV BLD AUTO: 9.7 FL (ref 9.2–11.8)
PROT UR STRIP-MCNC: 300 MG/DL
RBC # BLD AUTO: 3.01 M/UL (ref 4.2–5.3)
RBC #/AREA URNS HPF: ABNORMAL /HPF (ref 0–5)
SP GR UR REFRACTOMETRY: 1.02 (ref 1–1.03)
UROBILINOGEN UR QL STRIP.AUTO: 1 EU/DL (ref 0.2–1)
WBC # BLD AUTO: 7.7 K/UL (ref 4.6–13.2)
WBC URNS QL MICRO: ABNORMAL /HPF (ref 0–5)

## 2020-08-19 PROCEDURE — 81001 URINALYSIS AUTO W/SCOPE: CPT

## 2020-08-19 PROCEDURE — 99283 EMERGENCY DEPT VISIT LOW MDM: CPT

## 2020-08-19 PROCEDURE — 85025 COMPLETE CBC W/AUTO DIFF WBC: CPT

## 2020-08-19 PROCEDURE — 80048 BASIC METABOLIC PNL TOTAL CA: CPT

## 2020-08-19 PROCEDURE — 87077 CULTURE AEROBIC IDENTIFY: CPT

## 2020-08-19 PROCEDURE — 87186 SC STD MICRODIL/AGAR DIL: CPT

## 2020-08-19 PROCEDURE — 87086 URINE CULTURE/COLONY COUNT: CPT

## 2020-08-20 ENCOUNTER — HOME CARE VISIT (OUTPATIENT)
Dept: SCHEDULING | Facility: HOME HEALTH | Age: 75
End: 2020-08-20
Payer: MEDICARE

## 2020-08-20 LAB
ANION GAP SERPL CALC-SCNC: 7 MMOL/L (ref 3–18)
BUN SERPL-MCNC: 27 MG/DL (ref 7–18)
BUN/CREAT SERPL: 5 (ref 12–20)
CALCIUM SERPL-MCNC: 8.9 MG/DL (ref 8.5–10.1)
CHLORIDE SERPL-SCNC: 107 MMOL/L (ref 100–111)
CO2 SERPL-SCNC: 32 MMOL/L (ref 21–32)
CREAT SERPL-MCNC: 5.38 MG/DL (ref 0.6–1.3)
GLUCOSE SERPL-MCNC: 104 MG/DL (ref 74–99)
POTASSIUM SERPL-SCNC: 3.7 MMOL/L (ref 3.5–5.5)
SODIUM SERPL-SCNC: 146 MMOL/L (ref 136–145)

## 2020-08-20 PROCEDURE — 400013 HH SOC

## 2020-08-20 PROCEDURE — G0299 HHS/HOSPICE OF RN EA 15 MIN: HCPCS

## 2020-08-20 NOTE — ED PROVIDER NOTES
EMERGENCY DEPARTMENT HISTORY AND PHYSICAL EXAM    9:32 PM      Date: 8/19/2020  Patient Name: Doug Figueroa    History of Presenting Illness     Chief Complaint   Patient presents with    Urinary Pain         History Provided By: Patient    HPI     Patient is a 66year old female with PMHx of recurrent UTI, ESRD,  recurrent nephrolithiasis now with nephrostomy tube. Patient states her symptoms started on Friday. States that she noticed that her urine \"smells like a nursing home\". Denies any change in color. Patient produces urine through her right nephrostomy tube. Home nurse changes the dressing on her nephrostomy tube every other day- patient states that it was last changed Monday evening and was clean. Endorses some back pain around the site of her nephrostomy tube that is mores superficial than deep. Denies any fevers/chills. No n/v. Does endorse some chronic generalized weakness that has been ongoing since she started dialysis 2 months ago. She also has a hx of nausea since starting dialysis, takes Zofran for this. No worsening nausea or vomiting. PCP: Natividad Caceres MD    Current Outpatient Medications   Medication Sig Dispense Refill    biotin 1,000 mcg chew Take 1 Tab by mouth daily.  cyanocobalamin 1,000 mcg tablet Take 1,000 mcg by mouth daily.  lactobacillus sp. 50 billion cpu (BIO-K PLUS) 50 billion cell -375 mg cap capsule Take 1 Cap by mouth daily. 30 Cap 2    tamsulosin (FLOMAX) 0.4 mg capsule Take 1 Cap by mouth daily. 90 Cap 3    sodium bicarbonate 650 mg tablet Take 2 Tabs by mouth three (3) times daily. Indications: excess body acid 30 Tab 1    acetaminophen (TYLENOL) 325 mg tablet Take 650 mg by mouth two (2) times a day.  allopurinoL (ZYLOPRIM) 100 mg tablet Take 200 mg by mouth daily.  ascorbic acid, vitamin C, (VITAMIN C) 500 mg tablet Take 500 mg by mouth daily.  calcitRIOL (ROCALTROL) 0.25 mcg capsule Take 0.25 mcg by mouth daily.       cholecalciferol (VITAMIN D3) (2,000 UNITS /50 MCG) cap capsule Take 2,000 Units by mouth two (2) times a day. Take two tabs a total of 4000 units      latanoprost (XALATAN) 0.005 % ophthalmic solution Administer 1 Drop to both eyes nightly. One drop at bedtime      levothyroxine (SYNTHROID) 125 mcg tablet Take 125 mcg by mouth Daily (before breakfast).  omeprazole (PRILOSEC) 20 mg capsule Take 20 mg by mouth daily.  ondansetron (ZOFRAN ODT) 4 mg disintegrating tablet Take 4 mg by mouth every eight (8) hours as needed for Nausea or Vomiting.  vit B Cmplx 3-FA-Vit C-Biotin (NEPHRO HOWIE RX) 1- mg-mg-mcg tablet Take 1 Tab by mouth daily.  gabapentin (NEURONTIN) 100 mg capsule Take 100 mg by mouth nightly. Past History     Past Medical History:  Past Medical History:   Diagnosis Date    Acidosis     Anemia     Arteriovenous fistula (HCC)     CKD (chronic kidney disease)     Diabetes (HCC)     HLD (hyperlipidemia)     HTN (hypertension)     Hyperparathyroidism due to renal insufficiency (HCC)     Hypothyroid     Kidney stone     Lung mass     Recurrent UTI     Ureter, stricture     Uric acid nephrolithiasis     Urinary incontinence        Past Surgical History:  Past Surgical History:   Procedure Laterality Date    HX APPENDECTOMY      HX CHOLECYSTECTOMY      HX GASTRIC BYPASS      HX KNEE ARTHROSCOPY      HX UROLOGICAL      right PCN placement    HX UROLOGICAL  07/23/2018    RIGHT URETEROSCOPY WITH HOLMIUM LASER    IR EXCHANGE NEPHRO PERC LT SI  2/21/2020    IR EXCHANGE NEPHRO PERC RT SI  4/13/2020    IR EXCHANGE NEPHRO PERC RT SI  7/17/2020    IR NEPHROURETERAL PERC RT PLC CATH NEW ACCESS  SI  4/30/2020       Family History:  History reviewed. No pertinent family history.     Social History:  Social History     Tobacco Use    Smoking status: Never Smoker    Smokeless tobacco: Never Used   Substance Use Topics    Alcohol use: Never     Frequency: Never    Drug use: Never Allergies: Allergies   Allergen Reactions    Ciprofloxacin Hives    Statins-Hmg-Coa Reductase Inhibitors Other (comments)     Body ache         Review of Systems     Review of Systems   Constitutional: Negative for chills and fever. HENT: Negative for congestion, rhinorrhea and sore throat. Respiratory: Negative for cough and wheezing. Cardiovascular: Negative for chest pain. Gastrointestinal: Positive for nausea. Negative for diarrhea and vomiting. Genitourinary: Negative for hematuria.        + smell to urine   Musculoskeletal: Positive for back pain. Skin: Negative for rash and wound. Neurological: Positive for weakness and headaches. Psychiatric/Behavioral: Negative for confusion. Physical Exam     Visit Vitals  /55 (BP 1 Location: Right arm, BP Patient Position: Sitting)   Pulse 94   Temp 98.3 °F (36.8 °C)   Resp 16   Ht 5' 2\" (1.575 m)   Wt 101 kg (222 lb 10.6 oz)   SpO2 96%   BMI 40.73 kg/m²     Physical Exam  Constitutional:       Appearance: She is normal weight. She is not ill-appearing or toxic-appearing. HENT:      Mouth/Throat:      Mouth: Mucous membranes are moist.   Eyes:      General: No scleral icterus. Extraocular Movements: Extraocular movements intact. Conjunctiva/sclera: Conjunctivae normal.   Neck:      Musculoskeletal: Normal range of motion and neck supple. Cardiovascular:      Rate and Rhythm: Normal rate and regular rhythm. Pulses: Normal pulses. Pulmonary:      Effort: Pulmonary effort is normal.      Breath sounds: Normal breath sounds. Abdominal:      Palpations: Abdomen is soft. Tenderness: There is no abdominal tenderness. There is no right CVA tenderness, left CVA tenderness, guarding or rebound. Musculoskeletal:      Right lower leg: No edema. Left lower leg: No edema. Comments: Nephrostomy tube in right flank   Skin:     General: Skin is warm and dry. Neurological:      General: No focal deficit present. Mental Status: She is alert. Diagnostic Study Results     Labs -  Recent Results (from the past 12 hour(s))   URINALYSIS W/ RFLX MICROSCOPIC    Collection Time: 08/19/20  8:57 PM   Result Value Ref Range    Color DARK YELLOW      Appearance TURBID      Specific gravity 1.021 1.005 - 1.030      pH (UA) 6.5 5.0 - 8.0      Protein 300 (A) NEG mg/dL    Glucose Negative NEG mg/dL    Ketone Negative NEG mg/dL    Bilirubin Negative NEG      Blood MODERATE (A) NEG      Urobilinogen 1.0 0.2 - 1.0 EU/dL    Nitrites Negative NEG      Leukocyte Esterase LARGE (A) NEG     URINE MICROSCOPIC ONLY    Collection Time: 08/19/20  8:57 PM   Result Value Ref Range    WBC TOO NUMEROUS TO COUNT 0 - 5 /hpf    RBC 4 to 10 0 - 5 /hpf    Epithelial cells FEW 0 - 5 /lpf    Bacteria 3+ (A) NEG /hpf   CBC WITH AUTOMATED DIFF    Collection Time: 08/19/20 11:43 PM   Result Value Ref Range    WBC 7.7 4.6 - 13.2 K/uL    RBC 3.01 (L) 4.20 - 5.30 M/uL    HGB 9.1 (L) 12.0 - 16.0 g/dL    HCT 30.2 (L) 35.0 - 45.0 %    .3 (H) 74.0 - 97.0 FL    MCH 30.2 24.0 - 34.0 PG    MCHC 30.1 (L) 31.0 - 37.0 g/dL    RDW 15.4 (H) 11.6 - 14.5 %    PLATELET 373 794 - 732 K/uL    MPV 9.7 9.2 - 11.8 FL    NEUTROPHILS 52 40 - 73 %    LYMPHOCYTES 36 21 - 52 %    MONOCYTES 8 3 - 10 %    EOSINOPHILS 3 0 - 5 %    BASOPHILS 1 0 - 2 %    ABS. NEUTROPHILS 4.0 1.8 - 8.0 K/UL    ABS. LYMPHOCYTES 2.7 0.9 - 3.6 K/UL    ABS. MONOCYTES 0.6 0.05 - 1.2 K/UL    ABS. EOSINOPHILS 0.2 0.0 - 0.4 K/UL    ABS. BASOPHILS 0.0 0.0 - 0.1 K/UL    DF AUTOMATED         Radiologic Studies -   No orders to display         Medical Decision Making   I am the first provider for this patient. I reviewed the vital signs, available nursing notes, past medical history, past surgical history, family history and social history. Vital Signs-Reviewed the patient's vital signs.       Records Reviewed: Nursing Notes, Previous Radiology Studies and Previous Laboratory Studies (Time of Review: 9:32 PM)    ED Course: Progress Notes, Reevaluation, and Consults:     Patient is a 66year old female with a right nephrostomy tube. She presents for foul smelling urine. Denies any other symptoms, states she has otherwise felt fine. Did endorse some right sided back pain, but this seems to be more superficial. UA is taken from nephrostomy bag- likely colonized, will send culture. Exam is negative for costovertebral tenderness, does seem to have some diffuse right sided back pain. No fever, vital signs are stable. Patient does not have any leukocytosis. Spoke with Dr. Annemarie Ruth, on on call for urology- given lack of fever/leukocytosis or other symptoms, will hold off on treating empirically. Will send culture. Patient can follow up with PCP and urology. Diagnosis     Clinical Impression:   1. Foul smelling urine        Disposition: Discharge    Follow-up Information     Follow up With Specialties Details Why Contact Info    Emilio Loya MD Family Medicine Schedule an appointment as soon as possible for a visit  [place] [phone]      Maricruz Saab MD Urology Schedule an appointment as soon as possible for a visit  [address] [place] [address] [id number] [phone]             DISCHARGE NOTE:   Pt has been reexamined. Patient has no new complaints, changes, or physical findings. Care plan outlined and precautions discussed. Results were reviewed with the patient. All medications were reviewed with the patient; will d/c home with pain medication. All of pt's questions and concerns were addressed. Alarm symptoms and return precautions associated with chief complaint and evaluation were reviewed with the patient in detail. The patient demonstrated adequate understanding. Patient was instructed and agrees to follow up with PCP, as well as to return to the ED upon further deterioration. Patient is ready to go home.   The patient is happy with this plan Patient's Medications   Start Taking    No medications on file   Continue Taking    ACETAMINOPHEN (TYLENOL) 325 MG TABLET    Take 650 mg by mouth two (2) times a day. ALLOPURINOL (ZYLOPRIM) 100 MG TABLET    Take 200 mg by mouth daily. ASCORBIC ACID, VITAMIN C, (VITAMIN C) 500 MG TABLET    Take 500 mg by mouth daily. BIOTIN 1,000 MCG CHEW    Take 1 Tab by mouth daily. CALCITRIOL (ROCALTROL) 0.25 MCG CAPSULE    Take 0.25 mcg by mouth daily. CHOLECALCIFEROL (VITAMIN D3) (2,000 UNITS /50 MCG) CAP CAPSULE    Take 2,000 Units by mouth two (2) times a day. Take two tabs a total of 4000 units    CYANOCOBALAMIN 1,000 MCG TABLET    Take 1,000 mcg by mouth daily. GABAPENTIN (NEURONTIN) 100 MG CAPSULE    Take 100 mg by mouth nightly. LACTOBACILLUS SP. 50 BILLION CPU (BIO-K PLUS) 50 BILLION CELL -375 MG CAP CAPSULE    Take 1 Cap by mouth daily. LATANOPROST (XALATAN) 0.005 % OPHTHALMIC SOLUTION    Administer 1 Drop to both eyes nightly. One drop at bedtime    LEVOTHYROXINE (SYNTHROID) 125 MCG TABLET    Take 125 mcg by mouth Daily (before breakfast). OMEPRAZOLE (PRILOSEC) 20 MG CAPSULE    Take 20 mg by mouth daily. ONDANSETRON (ZOFRAN ODT) 4 MG DISINTEGRATING TABLET    Take 4 mg by mouth every eight (8) hours as needed for Nausea or Vomiting. SODIUM BICARBONATE 650 MG TABLET    Take 2 Tabs by mouth three (3) times daily. Indications: excess body acid    TAMSULOSIN (FLOMAX) 0.4 MG CAPSULE    Take 1 Cap by mouth daily. VIT B CMPLX 3-FA-VIT C-BIOTIN (NEPHRO HOWIE RX) 1- MG-MG-MCG TABLET    Take 1 Tab by mouth daily. These Medications have changed    No medications on file   Stop Taking    No medications on file     Disclaimer: Sections of this note are dictated using utilizing voice recognition software. Minor typographical errors may be present. If questions arise, please do not hesitate to contact me or call our department.       Carla Reyez, PGY-2   [facility] August 20, 2020, 12:32 AM

## 2020-08-20 NOTE — ED TRIAGE NOTES
Patient to triage with dysuria and malodorous urine . Symptoms started Thursday. Pt denies fever/chills and N/V/D.

## 2020-08-21 VITALS
SYSTOLIC BLOOD PRESSURE: 90 MMHG | RESPIRATION RATE: 20 BRPM | TEMPERATURE: 98.4 F | HEART RATE: 117 BPM | OXYGEN SATURATION: 98 % | DIASTOLIC BLOOD PRESSURE: 60 MMHG

## 2020-08-22 LAB
BACTERIA SPEC CULT: ABNORMAL
CC UR VC: ABNORMAL
SERVICE CMNT-IMP: ABNORMAL

## 2020-08-28 DIAGNOSIS — N18.9 ANEMIA OF CHRONIC RENAL FAILURE, UNSPECIFIED CKD STAGE: ICD-10-CM

## 2020-08-28 DIAGNOSIS — D63.1 ANEMIA OF CHRONIC RENAL FAILURE, UNSPECIFIED CKD STAGE: ICD-10-CM

## 2020-08-29 ENCOUNTER — HOME CARE VISIT (OUTPATIENT)
Dept: SCHEDULING | Facility: HOME HEALTH | Age: 75
End: 2020-08-29
Payer: MEDICARE

## 2020-08-29 VITALS
OXYGEN SATURATION: 97 % | TEMPERATURE: 96.7 F | DIASTOLIC BLOOD PRESSURE: 68 MMHG | SYSTOLIC BLOOD PRESSURE: 118 MMHG | RESPIRATION RATE: 20 BRPM | HEART RATE: 68 BPM

## 2020-08-29 PROCEDURE — G0299 HHS/HOSPICE OF RN EA 15 MIN: HCPCS

## 2020-09-02 ENCOUNTER — OFFICE VISIT (OUTPATIENT)
Dept: CARDIOLOGY CLINIC | Age: 75
End: 2020-09-02

## 2020-09-02 VITALS
SYSTOLIC BLOOD PRESSURE: 115 MMHG | BODY MASS INDEX: 41.92 KG/M2 | TEMPERATURE: 98.2 F | OXYGEN SATURATION: 97 % | DIASTOLIC BLOOD PRESSURE: 42 MMHG | WEIGHT: 227.8 LBS | HEIGHT: 62 IN | HEART RATE: 84 BPM

## 2020-09-02 DIAGNOSIS — N18.5 CKD (CHRONIC KIDNEY DISEASE) STAGE 5, GFR LESS THAN 15 ML/MIN (HCC): ICD-10-CM

## 2020-09-02 DIAGNOSIS — I10 ESSENTIAL HYPERTENSION: ICD-10-CM

## 2020-09-02 DIAGNOSIS — R06.02 SHORTNESS OF BREATH: Primary | ICD-10-CM

## 2020-09-02 DIAGNOSIS — R07.89 OTHER CHEST PAIN: ICD-10-CM

## 2020-09-02 DIAGNOSIS — E03.9 ACQUIRED HYPOTHYROIDISM: ICD-10-CM

## 2020-09-02 DIAGNOSIS — I95.0 IDIOPATHIC HYPOTENSION: ICD-10-CM

## 2020-09-02 RX ORDER — HYDRALAZINE HYDROCHLORIDE 10 MG/1
10 TABLET, FILM COATED ORAL 4 TIMES DAILY
Qty: 100 TAB | Refills: 1 | Status: SHIPPED | OUTPATIENT
Start: 2020-09-02 | End: 2021-06-21

## 2020-09-02 NOTE — PATIENT INSTRUCTIONS
After the recommended changes have been made in blood pressure medicines, patient advised to keep BP/HR(pulse rate) chart twice daily and bring us results in next 1 week or so. Patient may send the results via \"My Chart\" if desired. Please rest for 5-10 minutes before checking blood pressure. Sit on a comfortable chair without crossing the legs and put your arm on a table. We recommend that you use an upper arm cuff. Check the blood pressure 3 times weekly and take the lowest reading. If you check the blood pressure in both arms, use the higher reading.

## 2020-09-02 NOTE — PROGRESS NOTES
HISTORY OF PRESENT ILLNESS  Jake Durbin is a 66 y.o. female. Patient referred for labile blood pressures. She has noted elevated b/p during recent vascular procedure with chest pressure and procedure was aborted at that time. She was also SOB and resolved with oxygen. She c/o recent episodes of hypotension and was Rx Midodrine. She reports this caused chest pain, thus she stopped taking it. She has HD Mon/Fri weekly. New Patient   The history is provided by the patient and medical records. Associated symptoms include shortness of breath. Pertinent negatives include no chest pain. Hypertension   The history is provided by the patient and relative. This is a chronic problem. The current episode started more than 1 week ago. The problem occurs every several days. Associated symptoms include shortness of breath. Pertinent negatives include no chest pain. She has tried nothing for the symptoms. Review of Systems   Constitutional: Negative for malaise/fatigue. HENT: Negative for congestion. Eyes: Negative for double vision and redness. Respiratory: Positive for shortness of breath. Negative for cough and wheezing. Cardiovascular: Negative for chest pain, palpitations, orthopnea, claudication, leg swelling and PND. Gastrointestinal: Negative for nausea and vomiting. Musculoskeletal: Negative for falls. Neurological: Negative for dizziness. Endo/Heme/Allergies: Does not bruise/bleed easily.      Family History   Problem Relation Age of Onset    Heart Surgery Sister        Past Medical History:   Diagnosis Date    Acidosis     Anemia     [facility]     CKD (chronic kidney disease)     Diabetes ([facility]     HLD (hyperlipidemia)     HTN (hypertension)     Hyperparathyroidism due to renal insufficiency (HCC)     Hypothyroid     Kidney stone     Lung mass     Recurrent UTI     Ureter, stricture     Uric acid nephrolithiasis     Urinary incontinence        Past Surgical History:   Procedure Laterality Date    HX APPENDECTOMY      HX CHOLECYSTECTOMY      HX GASTRIC BYPASS      HX KNEE ARTHROSCOPY      HX UROLOGICAL      right PCN placement    HX UROLOGICAL  07/23/2018    RIGHT URETEROSCOPY WITH HOLMIUM LASER    IR EXCHANGE NEPHRO PERC LT SI  2/21/2020    IR EXCHANGE NEPHRO PERC RT SI  4/13/2020    IR EXCHANGE NEPHRO PERC RT SI  7/17/2020    IR NEPHROURETERAL PERC RT PLC CATH NEW ACCESS  SI  4/30/2020       Social History     Tobacco Use    Smoking status: Never Smoker    Smokeless tobacco: Never Used   Substance Use Topics    Alcohol use: Never     Frequency: Never       Allergies   Allergen Reactions    Ciprofloxacin Hives    Statins-Hmg-Coa Reductase Inhibitors Other (comments)     Body ache       Prior to Admission medications    Medication Sig Start Date End Date Taking? Authorizing Provider   biotin 1,000 mcg chew Take 1 Tab by mouth daily. Yes Provider, Historical   cyanocobalamin 1,000 mcg tablet Take 1,000 mcg by mouth daily. Yes Provider, Historical   gabapentin (NEURONTIN) 100 mg capsule Take 100 mg by mouth nightly. Yes Provider, Historical   lactobacillus sp. 50 billion cpu (BIO-K PLUS) 50 billion cell -375 mg cap capsule Take 1 Cap by mouth daily. 2/25/20  Yes Juan Kim MD   tamsulosin (FLOMAX) 0.4 mg capsule Take 1 Cap by mouth daily. 2/25/20  Yes Juan Kim MD   sodium bicarbonate 650 mg tablet Take 2 Tabs by mouth three (3) times daily. Indications: excess body acid  Patient taking differently: Take 1,300 mg by mouth two (2) times a day. 3 tabs bid  Indications: excess body acid 2/24/20  Yes Juan Kim MD   acetaminophen (TYLENOL) 325 mg tablet Take 650 mg by mouth two (2) times a day. Yes Samira, MD [facility]   allopurinoL (ZYLOPRIM) 100 mg tablet Take 200 mg by mouth daily. Yes Samira, MD [facility]   ascorbic acid, vitamin C, (VITAMIN C) 500 mg tablet Take 500 mg by mouth daily.    Yes Samira, MD [facility]   calcitRIOL (ROCALTROL) 0.25 mcg capsule Take 0.25 mcg by mouth daily. Yes Samira, MD [facility]   cholecalciferol (VITAMIN D3) (2,000 UNITS /50 MCG) cap capsule Take 2,000 Units by mouth two (2) times a day. Take two tabs a total of 4000 units   Yes Lexus Hogan MD   latanoprost (XALATAN) 0.005 % ophthalmic solution Administer 1 Drop to both eyes nightly. One drop at bedtime   Yes Lexus Hogan MD   levothyroxine (SYNTHROID) 125 mcg tablet Take 125 mcg by mouth Daily (before breakfast). Yes Samira, MD [facility]   omeprazole (PRILOSEC) 20 mg capsule Take 20 mg by mouth daily. Yes Lexus Hogan MD   ondansetron (ZOFRAN ODT) 4 mg disintegrating tablet Take 4 mg by mouth every eight (8) hours as needed for Nausea or Vomiting. Yes Samira, MD [facility]   vit B Cmplx 3-FA-Vit C-Biotin (NEPHRO HOWIE RX) 1- mg-mg-mcg tablet Take 1 Tab by mouth daily. Yes Lexus Hogan MD         Visit Vitals  /42 (BP 1 Location: Right arm, BP Patient Position: Sitting)   Pulse 84   Temp 98.2 °F (36.8 °C) (Temporal)   Ht 5' 2\" (1.575 m)   Wt 103.3 kg (227 lb 12.8 oz)   SpO2 97%   BMI 41.67 kg/m²       Physical Exam   Constitutional: She is oriented to person, place, and time. She appears well-developed and well-nourished. Neck: No JVD present. Cardiovascular: Normal rate, regular rhythm, normal heart sounds and intact distal pulses. Exam reveals no gallop and no friction rub. No murmur heard. Pulmonary/Chest: Effort normal and breath sounds normal. No respiratory distress. She has no wheezes. She has no rales. She exhibits no tenderness. Abdominal: Soft. She exhibits no distension and no mass. There is no abdominal tenderness. Musculoskeletal: Normal range of motion. General: Edema (trace pedal edema) present. Neurological: She is alert and oriented to person, place, and time. Skin: Skin is warm and dry. Psychiatric: She has a normal mood and affect. Nursing note and vitals reviewed.     Echo 2018  Normal left ventricular size and systolic function. · Estimated LVEF is approximately 61%. · Grossly normal right ventricular size and function. · No significant valvular abnormalities. · Borderline mild pulmonary hypertension. · No prior report for comparison. ASSESSMENT and PLAN  Ms. Berenice Peng has a reminder for a \"due or due soon\" health maintenance. I have asked that she contact her primary care provider for follow-up on this health maintenance. No flowsheet data found. Assessment   I Have personally reviewed recent relevant labs available and discussed with patient  I have personally reviewed patients ekg done at other facility. ICD-10-CM ICD-9-CM    1. Shortness of breath  R06.02 786.05     Chronic - worse with elevated B/P  Check echo and stress test   2. Idiopathic hypotension  I95.0 458.9     Stopped taking Midodrine due to chest pain   3. Acquired hypothyroidism  E03.9 244.9     Continue treatment with PCP   4. CKD (chronic kidney disease) stage 5, GFR less than 15 ml/min ([facility])  N18.5 585.5     HD Mon/ Fri   5. Essential hypertension  I10 401.9     Labile b/p   6. Other chest pain  R07.89 786.59     Rule out ischemia with stress test     9/2020 Patient referred for labile b/p. She c/o shortness of breath with chest pain at times when b/p is elevated. Will evaluate for ischemia with stress test and echo. Rx Hydralazine every 6 hours as needed for b/p > 140. Instructed to monitor b/p BID and log - to send log to office in 1 week. If hypotension documented, will consider florinef since she had adverse reaction to Midodrine. There are no discontinued medications. No orders of the defined types were placed in this encounter. Follow-up and Dispositions    · Return in about 3 months (around 12/2/2020), or if symptoms worsen or fail to improve, for Post testing. I have independently evaluated taken history and examined the patient. All relevant labs and testing data's are reviewed.   Care plan discussed and updated after review.   Brain MD Sol

## 2020-09-03 ENCOUNTER — HOME CARE VISIT (OUTPATIENT)
Dept: SCHEDULING | Facility: HOME HEALTH | Age: 75
End: 2020-09-03
Payer: MEDICARE

## 2020-09-03 PROCEDURE — G0299 HHS/HOSPICE OF RN EA 15 MIN: HCPCS

## 2020-09-05 VITALS
DIASTOLIC BLOOD PRESSURE: 80 MMHG | OXYGEN SATURATION: 98 % | RESPIRATION RATE: 20 BRPM | HEART RATE: 81 BPM | SYSTOLIC BLOOD PRESSURE: 140 MMHG | TEMPERATURE: 98.6 F

## 2020-09-10 ENCOUNTER — HOME CARE VISIT (OUTPATIENT)
Dept: HOME HEALTH SERVICES | Facility: HOME HEALTH | Age: 75
End: 2020-09-10
Payer: MEDICARE

## 2020-09-11 DIAGNOSIS — N18.9 ANEMIA OF CHRONIC RENAL FAILURE, UNSPECIFIED CKD STAGE: ICD-10-CM

## 2020-09-11 DIAGNOSIS — D63.1 ANEMIA OF CHRONIC RENAL FAILURE, UNSPECIFIED CKD STAGE: ICD-10-CM

## 2020-09-17 ENCOUNTER — HOME CARE VISIT (OUTPATIENT)
Dept: SCHEDULING | Facility: HOME HEALTH | Age: 75
End: 2020-09-17
Payer: MEDICARE

## 2020-09-17 VITALS
RESPIRATION RATE: 20 BRPM | HEART RATE: 107 BPM | SYSTOLIC BLOOD PRESSURE: 122 MMHG | OXYGEN SATURATION: 98 % | DIASTOLIC BLOOD PRESSURE: 80 MMHG | TEMPERATURE: 98.4 F

## 2020-09-17 PROCEDURE — G0299 HHS/HOSPICE OF RN EA 15 MIN: HCPCS

## 2020-09-18 ENCOUNTER — HOME CARE VISIT (OUTPATIENT)
Dept: HOME HEALTH SERVICES | Facility: HOME HEALTH | Age: 75
End: 2020-09-18
Payer: MEDICARE

## 2020-09-18 ENCOUNTER — DOCUMENTATION ONLY (OUTPATIENT)
Dept: VASCULAR SURGERY | Age: 75
End: 2020-09-18

## 2020-09-18 PROCEDURE — 400014 HH F/U

## 2020-09-18 NOTE — PROGRESS NOTES
Patient's dialysis unit has notified our office that they are having a difficult time cannulating left arm fistula. Will schedule patient for left arm fistulogram and will have surgery scheduler contact patient with date and time. Reviewed with Dr. Bony Glaser.

## 2020-09-23 ENCOUNTER — TELEPHONE (OUTPATIENT)
Dept: VASCULAR SURGERY | Age: 75
End: 2020-09-23

## 2020-09-23 RX ORDER — SODIUM CHLORIDE 9 MG/ML
20 INJECTION, SOLUTION INTRAVENOUS CONTINUOUS
Status: CANCELLED | OUTPATIENT
Start: 2020-09-23

## 2020-09-24 ENCOUNTER — HOSPITAL ENCOUNTER (OUTPATIENT)
Age: 75
Setting detail: OUTPATIENT SURGERY
Discharge: HOME | End: 2020-09-24
Attending: SURGERY | Admitting: SURGERY
Payer: MEDICARE

## 2020-09-24 VITALS
WEIGHT: 227.07 LBS | HEART RATE: 84 BPM | DIASTOLIC BLOOD PRESSURE: 59 MMHG | SYSTOLIC BLOOD PRESSURE: 129 MMHG | HEIGHT: 62 IN | BODY MASS INDEX: 41.79 KG/M2 | OXYGEN SATURATION: 99 % | RESPIRATION RATE: 14 BRPM

## 2020-09-24 DIAGNOSIS — N18.6 ESRD (END STAGE RENAL DISEASE) (HCC): ICD-10-CM

## 2020-09-24 DIAGNOSIS — T82.9XXA COMPLICATION OF DIALYSIS ACCESS INSERTION: ICD-10-CM

## 2020-09-24 LAB
BUN BLD-MCNC: 19 MG/DL (ref 7–18)
CHLORIDE BLD-SCNC: 102 MMOL/L (ref 100–108)
GLUCOSE BLD STRIP.AUTO-MCNC: 81 MG/DL (ref 74–106)
HCT VFR BLD CALC: 26 % (ref 36–49)
HGB BLD-MCNC: 8.8 G/DL (ref 12–16)
POTASSIUM BLD-SCNC: 4 MMOL/L (ref 3.5–5.5)
SODIUM BLD-SCNC: 142 MMOL/L (ref 136–145)

## 2020-09-24 PROCEDURE — 99212 OFFICE O/P EST SF 10 MIN: CPT | Performed by: SURGERY

## 2020-09-24 PROCEDURE — 82947 ASSAY GLUCOSE BLOOD QUANT: CPT

## 2020-09-24 PROCEDURE — 74011000250 HC RX REV CODE- 250: Performed by: SURGERY

## 2020-09-24 PROCEDURE — 74011250636 HC RX REV CODE- 250/636: Performed by: SURGERY

## 2020-09-24 PROCEDURE — 77030013744: Performed by: SURGERY

## 2020-09-24 PROCEDURE — 77030028790 HC ACC ST CTRL VLV COPLT ABBT -B: Performed by: SURGERY

## 2020-09-24 PROCEDURE — 36901 INTRO CATH DIALYSIS CIRCUIT: CPT | Performed by: SURGERY

## 2020-09-24 PROCEDURE — 77030018865 HC TRNSDCR PRSS MON EDWD -A: Performed by: SURGERY

## 2020-09-24 PROCEDURE — C1894 INTRO/SHEATH, NON-LASER: HCPCS | Performed by: SURGERY

## 2020-09-24 PROCEDURE — 74011000636 HC RX REV CODE- 636: Performed by: SURGERY

## 2020-09-24 RX ORDER — MIDAZOLAM HYDROCHLORIDE 1 MG/ML
INJECTION, SOLUTION INTRAMUSCULAR; INTRAVENOUS AS NEEDED
Status: DISCONTINUED | OUTPATIENT
Start: 2020-09-24 | End: 2020-09-24 | Stop reason: HOSPADM

## 2020-09-24 RX ORDER — FENTANYL CITRATE 50 UG/ML
INJECTION, SOLUTION INTRAMUSCULAR; INTRAVENOUS AS NEEDED
Status: DISCONTINUED | OUTPATIENT
Start: 2020-09-24 | End: 2020-09-24 | Stop reason: HOSPADM

## 2020-09-24 RX ORDER — LIDOCAINE HYDROCHLORIDE 10 MG/ML
INJECTION, SOLUTION EPIDURAL; INFILTRATION; INTRACAUDAL; PERINEURAL AS NEEDED
Status: DISCONTINUED | OUTPATIENT
Start: 2020-09-24 | End: 2020-09-24 | Stop reason: HOSPADM

## 2020-09-24 NOTE — Clinical Note
TRANSFER - OUT REPORT:     Verbal report given to: NISHI Estrada. Report consisted of patient's Situation, Background, Assessment and   Recommendations(SBAR). Opportunity for questions and clarification was provided. Patient transported with a Cardiac Cath Tech / Patient Care Tech. Patient transported to: [address]

## 2020-09-24 NOTE — PROGRESS NOTES
TRANSFER - OUT REPORT:    Verbal report given to [facility] (name) on [place]  being transferred to vascular lab (unit) for ordered procedure       Report consisted of patients Situation, Background, Assessment and   Recommendations(SBAR). Information from the following report(s) SBAR, Intake/Output, MAR and Recent Results was reviewed with the receiving nurse. Lines:   Venous Access Device AV Fistula (Active)       Peripheral IV 09/24/20 Proximal;Right (Active)   Site Assessment Clean, dry, & intact 09/24/20 0900   Phlebitis Assessment 0 09/24/20 0900   Infiltration Assessment 0 09/24/20 0900   Dressing Status Clean, dry, & intact 09/24/20 0900   Dressing Type Transparent 09/24/20 0900   Hub Color/Line Status Blue;Flushed 09/24/20 0900        Opportunity for questions and clarification was provided.       Patient transported with:   [employer] at work

## 2020-09-24 NOTE — DISCHARGE INSTRUCTIONS
Patient Education        Hemodialysis Access Surgery: What to Expect at Home  Your Recovery  Hemodialysis is a way to remove wastes from the blood when your kidneys can no longer do the job. It's not a cure, but it can help you live longer and feel better. It's a lifesaving treatment when you have kidney failure. Hemodialysis is often called dialysis. Your doctor created a place (called an access) in your arm for your blood to flow in and out of your body during your dialysis sessions. Your arm will probably be bruised and swollen. It may hurt. The cut (incision) may bleed. The pain and bleeding will get better over several days. You will probably need only over-the-counter pain medicine. You can reduce swelling by propping up your arm on 1 or 2 pillows and keeping your elbow straight. You will have stitches. These may dissolve on their own, or your doctor will tell you when to come in to have them removed. You should also be able to return to work in a few days. You may feel some coolness or numbness in your hand. These feelings usually go away in a few weeks. Your doctor may suggest squeezing a soft object. This will strengthen your access and may make hemodialysis faster and easier. You should always be able to feel blood rushing through the fistula or graft. It feels like a slight vibration when you put your fingers on the skin over the fistula or graft. This feeling is called a thrill or a pulse. This care sheet gives you a general idea about how long it will take for you to recover. But each person recovers at a different pace. Follow the steps below to get better as quickly as possible. How can you care for yourself at home? Activity    · Rest when you feel tired. Getting enough sleep will help you recover.  Do not lie on or sleep on the arm with the access.     · Avoid activities such as washing windows or gardening that put stress on the arm with the access.     · You may use your arm, but do not lift anything that weighs more than about 15 pounds. This may include a child, heavy grocery bags, a heavy briefcase or backpack, cat litter or dog food bags, or a vacuum .     · You can shower, but keep the access dry for the first 2 days. Cover the area with a plastic bag to keep it dry.     · Do not soak or scrub the incision until it has healed.     · Wear an arm guard to protect the area if you play sports or work with your arms.     · You may drive when your doctor says it is okay. This is usually in 1 to 2 days.     · Most people are able to return to work about 1 or 2 days after surgery. Diet    · Follow an eating plan that is good for your kidneys. A registered dietitian can help you make a meal plan that is right for you. You may need to limit protein, salt, fluids, and certain foods. Medicines    · Your doctor will tell you if and when you can restart your medicines. He or she will also give you instructions about taking any new medicines.     · If you take aspirin or some other blood thinner, ask your doctor if and when to start taking it again. Make sure that you understand exactly what your doctor wants you to do.     · Take pain medicines exactly as directed. ? If the doctor gave you a prescription medicine for pain, take it as prescribed. ? If you are not taking a prescription pain medicine, ask your doctor if you can take acetaminophen (Tylenol). Do not take ibuprofen (Advil, Motrin) or naproxen (Aleve), or similar medicines, unless your doctor tells you to. They may make chronic kidney disease worse. ? Do not take two or more pain medicines at the same time unless the doctor told you to. Many pain medicines have acetaminophen, which is Tylenol. Too much acetaminophen (Tylenol) can be harmful.     · If you think your pain medicine is making you sick to your stomach:  ? Take your medicine after meals (unless your doctor has told you not to). ? Ask your doctor for a different pain medicine.   · If your doctor prescribed antibiotics, take them as directed. Do not stop taking them just because you feel better. You need to take the full course of antibiotics. Incision care    · Keep the area dry for 2 days. After 2 days, wash the area with soap and water every day, and always before dialysis.     · Do not soak or scrub the incision until it has healed.     · If you have a bandage, change it every day or as your doctor recommends. Your doctor will tell you when you can remove it. Exercise    · Squeeze a soft ball or other object as your doctor tells you. This will help blood flow through the access and help prevent blood clots. Elevation    · Prop up the sore arm on a pillow anytime you sit or lie down during the next 3 days. Try to keep it above the level of your heart. This will help reduce swelling. Other instructions    · Every day, check your access for a pulse or thrill in the fistula or graft area. A thrill is a vibration. To feel a pulse or thrill, place the first two fingers of your hand over the access.     · Do not bump your arm.     · Do not wear tight clothing, jewelry, or anything else that may squeeze the access.     · Use your other arm to have blood drawn or blood pressure taken.     · Do not put cream or lotion on or near the access.     · Make sure all doctors you deal with know that you have a vascular access. Follow-up care is a key part of your treatment and safety. Be sure to make and go to all appointments, and call your doctor if you are having problems. It's also a good idea to know your test results and keep a list of the medicines you take. When should you call for help? Call 911 anytime you think you may need emergency care. For example, call if:    · You passed out (lost consciousness).     · You have chest pain, are short of breath, or cough up blood.    Call your doctor now or seek immediate medical care if:    · Your hand or arm is cold or dark-colored.     · You have no pulse in your access.     · You have nausea or you vomit.     · You have pain that does not get better after you take pain medicine.     · You have loose stitches, or your incision comes open.     · You are bleeding from the incision.     · You have signs of infection, such as:  ? Increased pain, swelling, warmth, or redness. ? Red streaks leading from the area. ? Pus draining from the area. ? A fever.     · You have signs of a blood clot in your leg (called a deep vein thrombosis), such as:  ? Pain in your calf, back of the knee, thigh, or groin. ? Redness or swelling in your leg. Watch closely for changes in your health, and be sure to contact your doctor if you have any problems. Where can you learn more? Go to http://www.gray.com/  Enter P616 in the search box to learn more about \"Hemodialysis Access Surgery: What to Expect at Home. \"  Current as of: April 15, 2020               Content Version: 12.6  © 6521-0465 [employer]. Care instructions adapted under license by [employer] (which disclaims liability or warranty for this information). If you have questions about a medical condition or this instruction, always ask your healthcare professional. [place] [address] any warranty or liability for your use of this information. DISCHARGE SUMMARY from Nurse    PATIENT INSTRUCTIONS:    After general anesthesia or intravenous sedation, for 24 hours or while taking prescription Narcotics:  · Limit your activities  · Do not drive and operate hazardous machinery  · Do not make important personal or business decisions  · Do  not drink alcoholic beverages  · If you have not urinated within 8 hours after discharge, please contact your surgeon on call.     Report the following to your surgeon:  · Excessive pain, swelling, redness or odor of or around the surgical area  · Temperature over 100.5  · Nausea and vomiting lasting longer than 4 hours or if unable to take medications  · Any signs of decreased circulation or nerve impairment to extremity: change in color, persistent  numbness, tingling, coldness or increase pain  · Any questions    What to do at Home:  Recommended activity: Activity as tolerated and no driving for today. If you experience any of the following symptoms pain unrelieved by over the counter pain medication, please follow up with Dr. Roya Alvarez. *  Please give a list of your current medications to your Primary Care Provider. *  Please update this list whenever your medications are discontinued, doses are      changed, or new medications (including over-the-counter products) are added. *  Please carry medication information at all times in case of emergency situations. These are general instructions for a healthy lifestyle:    No smoking/ No tobacco products/ Avoid exposure to second hand smoke  Surgeon General's Warning:  Quitting smoking now greatly reduces serious risk to your health. Obesity, smoking, and sedentary lifestyle greatly increases your risk for illness    A healthy diet, regular physical exercise & weight monitoring are important for maintaining a healthy lifestyle    You may be retaining fluid if you have a history of heart failure or if you experience any of the following symptoms:  Weight gain of 3 pounds or more overnight or 5 pounds in a week, increased swelling in our hands or feet or shortness of breath while lying flat in bed. Please call your doctor as soon as you notice any of these symptoms; do not wait until your next office visit. The discharge information has been reviewed with the patient. The patient verbalized understanding.   Discharge medications reviewed with the patient and appropriate educational materials and side effects teaching were provided.   ___________________________________________________________________________________________________________________________________

## 2020-09-24 NOTE — PROGRESS NOTES
Discharge instructions discussed with patient. Patient verbalized understanding and had no further questions. PIV was removed without issue and patient was disconnected from the monitor. Patients dressing to LUE wrist is clean, dry, and intact. Pt has no c/o pain. Patient was discharged safely to son, Severino Posey, in stable condition.

## 2020-09-24 NOTE — PROGRESS NOTES
TRANSFER - IN REPORT:    Verbal report received from Deep Vásquez (name) on [place]  being received from vascular lab (unit) for routine post - op      Report consisted of patients Situation, Background, Assessment and   Recommendations(SBAR). Information from the following report(s) SBAR, Procedure Summary, MAR and Recent Results was reviewed with the receiving nurse. Opportunity for questions and clarification was provided. Assessment completed upon patients arrival to unit and care assumed. Suture and bandaid to [facility].

## 2020-09-24 NOTE — H&P
Surgery History and Physical    Subjective:      Figueroa Galdamez is a 66 y.o.  female who presents with a malfunctioning AVF. The patient states it depends on the day and the tech whether or not her fistula can be used. She had no issues with dialysis yesterday. She denies any pain or hand numbness.     Patient Active Problem List    Diagnosis Date Noted    Complicated UTI (urinary tract infection) 07/15/2020    Type 2 diabetes mellitus, without long-term current use of insulin ([address] 05/17/2020    CKD (chronic kidney disease) stage 5, GFR less than 15 ml/min ([address] 05/17/2020    Acquired hypothyroidism 05/17/2020    GERD (gastroesophageal reflux disease) 05/17/2020    Anemia 05/15/2020    Hypotension 05/15/2020    UTI (urinary tract infection) 05/15/2020    Anemia of chronic renal failure 03/19/2020    Pyelonephritis 67/69/9451    Complicated urinary tract infection 02/20/2020     Past Medical History:   Diagnosis Date    Acidosis     Anemia     Arteriovenous fistula (HCC)     CKD (chronic kidney disease)     Diabetes (HCC)     HLD (hyperlipidemia)     HTN (hypertension)     Hyperparathyroidism due to renal insufficiency (HCC)     Hypothyroid     Kidney stone     Lung mass     Recurrent UTI     Ureter, stricture     Uric acid nephrolithiasis     Urinary incontinence       Past Surgical History:   Procedure Laterality Date    HX APPENDECTOMY      HX CHOLECYSTECTOMY      HX GASTRIC BYPASS      HX KNEE ARTHROSCOPY      HX UROLOGICAL      right PCN placement    HX UROLOGICAL  07/23/2018    RIGHT URETEROSCOPY WITH HOLMIUM LASER    IR EXCHANGE NEPHRO PERC LT SI  2/21/2020    IR EXCHANGE NEPHRO PERC RT SI  4/13/2020    IR EXCHANGE NEPHRO PERC RT SI  7/17/2020    IR NEPHROURETERAL PERC RT PLC CATH NEW ACCESS  SI  4/30/2020      Social History     Tobacco Use    Smoking status: Never Smoker    Smokeless tobacco: Never Used   Substance Use Topics    Alcohol use: Never Frequency: Never      Family History   Problem Relation Age of Onset    Heart Surgery Sister       Prior to Admission medications    Medication Sig Start Date End Date Taking? Authorizing Provider   hydrALAZINE (APRESOLINE) 10 mg tablet Take 1 Tab by mouth four (4) times daily. 9/2/20  Yes Thuy Suarez NP   biotin 1,000 mcg chew Take 1 Tab by mouth daily. Yes Provider, Historical   cyanocobalamin 1,000 mcg tablet Take 1,000 mcg by mouth daily. Yes Provider, Historical   gabapentin (NEURONTIN) 100 mg capsule Take 100 mg by mouth nightly. Yes Provider, Historical   lactobacillus sp. 50 billion cpu (BIO-K PLUS) 50 billion cell -375 mg cap capsule Take 1 Cap by mouth daily. 2/25/20  Yes Marlo Burch MD   tamsulosin (FLOMAX) 0.4 mg capsule Take 1 Cap by mouth daily. 2/25/20  Yes Marlo Burch MD   sodium bicarbonate 650 mg tablet Take 2 Tabs by mouth three (3) times daily. Indications: excess body acid  Patient taking differently: Take 1,300 mg by mouth two (2) times a day. 3 tabs bid  Indications: excess body acid 2/24/20  Yes Marlo Burch MD   acetaminophen (TYLENOL) 325 mg tablet Take 650 mg by mouth two (2) times a day. Yes Samira, MD [facility]   allopurinoL (ZYLOPRIM) 100 mg tablet Take 200 mg by mouth daily. Yes Samira, MD [facility]   ascorbic acid, vitamin C, (VITAMIN C) 500 mg tablet Take 500 mg by mouth daily. Yes Lexus Hogan MD   calcitRIOL (ROCALTROL) 0.25 mcg capsule Take 0.25 mcg by mouth daily. Yes Samira, MD [facility]   cholecalciferol (VITAMIN D3) (2,000 UNITS /50 MCG) cap capsule Take 2,000 Units by mouth two (2) times a day. Take two tabs a total of 4000 units   Yes Lexus Hogan MD   latanoprost (XALATAN) 0.005 % ophthalmic solution Administer 1 Drop to both eyes nightly. One drop at bedtime   Yes Lexus Hogan MD   levothyroxine (SYNTHROID) 125 mcg tablet Take 125 mcg by mouth Daily (before breakfast).    Yes Lexus Hogan MD   omeprazole (PRILOSEC) 20 mg capsule Take 20 mg by mouth daily. Yes Other, MD [facility]   vit B Cmplx 3-FA-Vit C-Biotin (NEPHRO HOWIE RX) 1- mg-mg-mcg tablet Take 1 Tab by mouth daily. Yes Other, MD [facility]   ondansetron (ZOFRAN ODT) 4 mg disintegrating tablet Take 4 mg by mouth every eight (8) hours as needed for Nausea or Vomiting. Other, MD [facility]     Allergies   Allergen Reactions    Ciprofloxacin Hives    Statins-Hmg-Coa Reductase Inhibitors Other (comments)     Body ache         Review of Systems:    Constitutional: negative  Eyes: negative  Ears, Nose, Mouth, Throat, and Face: negative  Respiratory: negative  Cardiovascular: negative  Gastrointestinal: negative  Genitourinary:negative  Musculoskeletal:negative  Neurological: negative    Objective:     Patient Vitals for the past 8 hrs:   BP Pulse Resp SpO2 Height Weight   09/24/20 0949 (!) 125/55 74 16 100 % -- --   09/24/20 0934 (!) 128/56 74 17 100 % -- --   09/24/20 0919 126/70 74 17 100 % -- --   09/24/20 0907 (!) 141/50 80 10 -- -- --   09/24/20 0819 (!) 142/64 82 15 (!) 84 % -- --   09/24/20 0814 -- -- -- -- 5' 2\" (1.575 m) 227 lb 1.2 oz (103 kg)       No data recorded. Physical Exam:  GENERAL: alert, cooperative, no distress, appears stated age, EYE: negative findings: anicteric sclera, LYMPHATIC: Cervical, supraclavicular, and axillary nodes normal. , THROAT & NECK: normal, LUNG: clear to auscultation bilaterally, HEART: regular rate and rhythm, ABDOMEN: soft, non-tender.  Bowel sounds normal. No masses,  no organomegaly, EXTREMITIES:  WWP.  +thrill in LUE AVF    Labs:   Recent Results (from the past 24 hour(s))   POC 6 PLUS    Collection Time: 09/24/20  8:45 AM   Result Value Ref Range    Sodium,  136 - 145 MMOL/L    Potassium, POC 4.0 3.5 - 5.5 MMOL/L    Chloride,  100 - 108 MMOL/L    BUN, POC 19 (H) 7 - 18 MG/DL    Glucose, POC 81 74 - 106 MG/DL    Hematocrit, POC 26 (L) 36 - 49 %    Hemoglobin, POC 8.8 (L) 12 - 16 G/DL       Data Review:    CBC:   Lab Results   Component Value Date/Time    WBC 7.7 08/19/2020 11:43 PM    RBC 3.01 (L) 08/19/2020 11:43 PM    HGB 9.1 (L) 08/19/2020 11:43 PM    HCT 30.2 (L) 08/19/2020 11:43 PM    PLATELET 015 23/02/4219 11:43 PM      BMP:   Lab Results   Component Value Date/Time    Glucose 104 (H) 08/19/2020 11:43 PM    Sodium 146 (H) 08/19/2020 11:43 PM    Potassium 3.7 08/19/2020 11:43 PM    Chloride 107 08/19/2020 11:43 PM    CO2 32 08/19/2020 11:43 PM    BUN 27 (H) 08/19/2020 11:43 PM    Creatinine 5.38 (H) 08/19/2020 11:43 PM    Calcium 8.9 08/19/2020 11:43 PM     Coagulation:   Lab Results   Component Value Date/Time    Prothrombin time 14.9 07/16/2020 04:52 PM    INR 1.2 07/16/2020 04:52 PM    aPTT 46.9 (H) 07/16/2020 04:52 PM       Assessment:     Active Problems:    * No active hospital problems.  *      Plan:     66 F with ESRD for a LUE fistulogram today    Signed By: Lyly Felder MD     September 24, 2020

## 2020-09-24 NOTE — PROGRESS NOTES
Bedrest instructions discussed with patient. Patient verbalized understanding. Patient resting comfortably. No distress noted.    Asif Mixon, son, was called and notified of discharge time

## 2020-09-24 NOTE — Clinical Note
Left brachial prepped with ChloraPrep and draped. Wet prep solution applied at: 1013. Wet prep solution dried at: 1016. Wet prep elapsed drying time: 3 mins.  Left Arm Fistula

## 2020-09-24 NOTE — Clinical Note
TRANSFER - IN REPORT:     Verbal report received from: Nena Stoll RN. Report consisted of patient's Situation, Background, Assessment and   Recommendations(SBAR). Opportunity for questions and clarification was provided. Assessment completed upon patient's arrival to unit and care assumed. Patient transported with a Cardiac Cath Tech / Patient Care Tech.

## 2020-09-25 DIAGNOSIS — N18.9 ANEMIA OF CHRONIC RENAL FAILURE, UNSPECIFIED CKD STAGE: ICD-10-CM

## 2020-09-25 DIAGNOSIS — D63.1 ANEMIA OF CHRONIC RENAL FAILURE, UNSPECIFIED CKD STAGE: ICD-10-CM

## 2020-09-26 ENCOUNTER — HOME CARE VISIT (OUTPATIENT)
Dept: HOME HEALTH SERVICES | Facility: HOME HEALTH | Age: 75
End: 2020-09-26
Payer: MEDICARE

## 2020-10-01 ENCOUNTER — HOME CARE VISIT (OUTPATIENT)
Dept: HOME HEALTH SERVICES | Facility: HOME HEALTH | Age: 75
End: 2020-10-01
Payer: MEDICARE

## 2020-10-08 ENCOUNTER — HOME CARE VISIT (OUTPATIENT)
Dept: SCHEDULING | Facility: HOME HEALTH | Age: 75
End: 2020-10-08
Payer: MEDICARE

## 2020-10-08 PROCEDURE — A6216 NON-STERILE GAUZE<=16 SQ IN: HCPCS

## 2020-10-08 PROCEDURE — 400014 HH F/U

## 2020-10-08 PROCEDURE — A4452 WATERPROOF TAPE: HCPCS

## 2020-10-08 PROCEDURE — G0299 HHS/HOSPICE OF RN EA 15 MIN: HCPCS

## 2020-10-08 PROCEDURE — A4333 URINARY CATH ANCHOR DEVICE: HCPCS

## 2020-10-09 VITALS
OXYGEN SATURATION: 99 % | TEMPERATURE: 97.4 F | SYSTOLIC BLOOD PRESSURE: 100 MMHG | DIASTOLIC BLOOD PRESSURE: 58 MMHG | HEART RATE: 106 BPM | RESPIRATION RATE: 20 BRPM

## 2020-10-09 DIAGNOSIS — D63.1 ANEMIA OF CHRONIC RENAL FAILURE, UNSPECIFIED CKD STAGE: ICD-10-CM

## 2020-10-09 DIAGNOSIS — N18.9 ANEMIA OF CHRONIC RENAL FAILURE, UNSPECIFIED CKD STAGE: ICD-10-CM

## 2020-10-14 ENCOUNTER — HOSPITAL ENCOUNTER (OUTPATIENT)
Dept: INTERVENTIONAL RADIOLOGY/VASCULAR | Age: 75
Discharge: HOME | End: 2020-10-14
Attending: UROLOGY | Admitting: RADIOLOGY
Payer: MEDICARE

## 2020-10-14 VITALS
SYSTOLIC BLOOD PRESSURE: 131 MMHG | WEIGHT: 216.05 LBS | BODY MASS INDEX: 39.76 KG/M2 | RESPIRATION RATE: 12 BRPM | OXYGEN SATURATION: 96 % | DIASTOLIC BLOOD PRESSURE: 53 MMHG | HEART RATE: 84 BPM | TEMPERATURE: 98.7 F | HEIGHT: 62 IN

## 2020-10-14 DIAGNOSIS — T83.092A OBSTRUCTED NEPHROSTOMY TUBE (HCC): ICD-10-CM

## 2020-10-14 DIAGNOSIS — N13.5 URETERAL STRICTURE: ICD-10-CM

## 2020-10-14 LAB
ANION GAP SERPL CALC-SCNC: 8 MMOL/L (ref 3–18)
APTT PPP: 35.4 SEC (ref 23–36.4)
BUN SERPL-MCNC: 36 MG/DL (ref 7–18)
BUN/CREAT SERPL: 7 (ref 12–20)
CALCIUM SERPL-MCNC: 9.2 MG/DL (ref 8.5–10.1)
CHLORIDE SERPL-SCNC: 100 MMOL/L (ref 100–111)
CO2 SERPL-SCNC: 32 MMOL/L (ref 21–32)
CREAT SERPL-MCNC: 5.44 MG/DL (ref 0.6–1.3)
ERYTHROCYTE [DISTWIDTH] IN BLOOD BY AUTOMATED COUNT: 15.4 % (ref 11.6–14.5)
GLUCOSE SERPL-MCNC: 101 MG/DL (ref 74–99)
HCT VFR BLD AUTO: 31.6 % (ref 35–45)
HGB BLD-MCNC: 9.8 G/DL (ref 12–16)
INR PPP: 1.1 (ref 0.8–1.2)
MCH RBC QN AUTO: 28.8 PG (ref 24–34)
MCHC RBC AUTO-ENTMCNC: 31 G/DL (ref 31–37)
MCV RBC AUTO: 92.9 FL (ref 74–97)
PLATELET # BLD AUTO: 291 K/UL (ref 135–420)
PMV BLD AUTO: 9.7 FL (ref 9.2–11.8)
POTASSIUM SERPL-SCNC: 3.4 MMOL/L (ref 3.5–5.5)
PROTHROMBIN TIME: 14.2 SEC (ref 11.5–15.2)
RBC # BLD AUTO: 3.4 M/UL (ref 4.2–5.3)
SODIUM SERPL-SCNC: 140 MMOL/L (ref 136–145)
WBC # BLD AUTO: 8.2 K/UL (ref 4.6–13.2)

## 2020-10-14 PROCEDURE — 85730 THROMBOPLASTIN TIME PARTIAL: CPT

## 2020-10-14 PROCEDURE — 50432 PLMT NEPHROSTOMY CATHETER: CPT

## 2020-10-14 PROCEDURE — 74011000250 HC RX REV CODE- 250: Performed by: RADIOLOGY

## 2020-10-14 PROCEDURE — 85027 COMPLETE CBC AUTOMATED: CPT

## 2020-10-14 PROCEDURE — C2617 STENT, NON-COR, TEM W/O DEL: HCPCS

## 2020-10-14 PROCEDURE — 85610 PROTHROMBIN TIME: CPT

## 2020-10-14 PROCEDURE — 74011000636 HC RX REV CODE- 636: Performed by: RADIOLOGY

## 2020-10-14 PROCEDURE — 74011000250 HC RX REV CODE- 250

## 2020-10-14 PROCEDURE — 74011250636 HC RX REV CODE- 250/636: Performed by: RADIOLOGY

## 2020-10-14 PROCEDURE — C1769 GUIDE WIRE: HCPCS

## 2020-10-14 PROCEDURE — 80048 BASIC METABOLIC PNL TOTAL CA: CPT

## 2020-10-14 RX ORDER — SODIUM CHLORIDE 9 MG/ML
20 INJECTION, SOLUTION INTRAVENOUS CONTINUOUS
Status: DISCONTINUED | OUTPATIENT
Start: 2020-10-14 | End: 2020-10-14 | Stop reason: HOSPADM

## 2020-10-14 RX ORDER — LIDOCAINE HYDROCHLORIDE 10 MG/ML
INJECTION, SOLUTION EPIDURAL; INFILTRATION; INTRACAUDAL; PERINEURAL
Status: COMPLETED
Start: 2020-10-14 | End: 2020-10-14

## 2020-10-14 RX ORDER — MIDAZOLAM HYDROCHLORIDE 1 MG/ML
.5-2 INJECTION, SOLUTION INTRAMUSCULAR; INTRAVENOUS
Status: DISCONTINUED | OUTPATIENT
Start: 2020-10-14 | End: 2020-10-14 | Stop reason: HOSPADM

## 2020-10-14 RX ORDER — FENTANYL CITRATE 50 UG/ML
12.5-5 INJECTION, SOLUTION INTRAMUSCULAR; INTRAVENOUS
Status: DISCONTINUED | OUTPATIENT
Start: 2020-10-14 | End: 2020-10-14 | Stop reason: HOSPADM

## 2020-10-14 RX ADMIN — MIDAZOLAM 1 MG: 1 INJECTION INTRAMUSCULAR; INTRAVENOUS at 13:20

## 2020-10-14 RX ADMIN — FENTANYL CITRATE 50 MCG: 50 INJECTION, SOLUTION INTRAMUSCULAR; INTRAVENOUS at 13:20

## 2020-10-14 RX ADMIN — MIDAZOLAM 1 MG: 1 INJECTION INTRAMUSCULAR; INTRAVENOUS at 13:15

## 2020-10-14 RX ADMIN — FENTANYL CITRATE 50 MCG: 50 INJECTION, SOLUTION INTRAMUSCULAR; INTRAVENOUS at 13:15

## 2020-10-14 RX ADMIN — IOHEXOL 50 ML: 300 INJECTION, SOLUTION INTRAVENOUS at 13:23

## 2020-10-14 RX ADMIN — LIDOCAINE HYDROCHLORIDE 30 ML: 10 INJECTION, SOLUTION EPIDURAL; INFILTRATION; INTRACAUDAL; PERINEURAL at 13:15

## 2020-10-14 RX ADMIN — CEFTRIAXONE SODIUM 1 G: 1 INJECTION, POWDER, FOR SOLUTION INTRAMUSCULAR; INTRAVENOUS at 13:15

## 2020-10-14 NOTE — ROUTINE PROCESS
11:47 AM  Cath holding summary     Patient escorted to cath holding from waiting area ambulatory, alert and oriented x 4, voicing no complaints. Changed into gown and placed on monitor. NPO since MN. Lab results, med rec and H&P reviewed on chart. PIV x 1 inserted without difficulty. 1247: TRANSFER - OUT REPORT:    Verbal report given to [address] [place] [address] (name) on [address]  being transferred to Interventional Radiology (unit) for ordered procedure       Report consisted of patients Situation, Background, Assessment and   Recommendations(SBAR). Information from the following report(s) SBAR, Intake/Output, MAR and Recent Results was reviewed with the receiving nurse. Lines:   Venous Access Device AV Fistula (Active)       Peripheral IV 10/14/20 Posterior;Right Hand (Active)   Site Assessment Clean, dry, & intact 10/14/20 1257   Dressing Status Clean, dry, & intact 10/14/20 1257   Hub Color/Line Status Blue;Patent; Flushed;Capped 10/14/20 1257   Alcohol Cap Used Yes 10/14/20 1257        Opportunity for questions and clarification was provided. Patient transported with:   [phone]:    TRANSFER - IN REPORT:    Verbal report received from [facility] (name) on [address] [facility]  being received from Interventional Radiology (unit) for routine post - op      Report consisted of patients Situation, Background, Assessment and   Recommendations(SBAR). Information from the following report(s) SBAR, Procedure Summary, Intake/Output, MAR and Recent Results was reviewed with the receiving nurse. Opportunity for questions and clarification was provided. Assessment completed upon patients arrival to unit and care assumed. 1500:    AVS Discharge instructions reviewed with patient and copy given to patient. All questions answered. Patient verbalized understanding to all discharge instructions. PIV removed. Procedural site within normal limits.   No hematoma or bleeding noted from procedural and PIV site. No pain noted at discharge. Patient discharged with support person in stable condition. Escorted out to vehicle for transport home.

## 2020-10-14 NOTE — PROCEDURES
RADIOLOGY POST PROCEDURE NOTE     October 14, 2020       1:29 PM     Preoperative Diagnosis: exchange external NU for 8 Fr. Double J    Postoperative Diagnosis:  Same. :  Dr. Ruth Norris    Assistant:  None. Type of Anesthesia: 1% plain lidocaine, moderate sedation    Procedure/Description:  Fluoro guided procedure    Findings:  Same. Estimated blood Loss:  Minimal    Specimen Removed:none    Blood loss:  Minimal    Implants:  None.     Complications: None    Condition: Stable    Discharge Plan:  discharge home after recovery from moderate conscious sedation    Anna Carlton MD

## 2020-10-14 NOTE — H&P
OUTPATIENT HISTORY AND PHYSICAL      Today 10/14/2020     Indication/Symptoms:   Bere Kraus is a 66 y.o. female with a history of right distal ureteral stricture with a nephroureteral catheter who presents for an image-guided conversion to JJ ureteral stent with moderate sedation. Patient has been NPO since midnight and takes no blood thinning medications. Current Meds:    Prior to Admission medications    Medication Sig Start Date End Date Taking? Authorizing Provider   b complex vitamins (Vitamins B Complex) tablet Take 1 Tab by mouth daily. Yes Provider, Historical   estradioL (Estrace) 0.01 % (0.1 mg/gram) vaginal cream Apply a fingertip amount around the urethra three times a week. 9/30/20  Yes Ayah Gonzales MD   hydrALAZINE (APRESOLINE) 10 mg tablet Take 1 Tab by mouth four (4) times daily. 9/2/20  Yes Thuy Suarez NP   biotin 1,000 mcg chew Take 1 Tab by mouth daily. Yes Provider, Historical   cyanocobalamin 1,000 mcg tablet Take 1,000 mcg by mouth daily. Yes Provider, Historical   gabapentin (NEURONTIN) 100 mg capsule Take 100 mg by mouth nightly. Yes Provider, Historical   lactobacillus sp. 50 billion cpu (BIO-K PLUS) 50 billion cell -375 mg cap capsule Take 1 Cap by mouth daily. 2/25/20  Yes Celestino Gayle MD   tamsulosin (FLOMAX) 0.4 mg capsule Take 1 Cap by mouth daily. 2/25/20  Yes Celestino Gayel MD   sodium bicarbonate 650 mg tablet Take 2 Tabs by mouth three (3) times daily. Indications: excess body acid  Patient taking differently: Take 1,300 mg by mouth two (2) times a day. 3 tabs bid  Indications: excess body acid 2/24/20  Yes Celestino Gayle MD   acetaminophen (TYLENOL) 325 mg tablet Take 650 mg by mouth two (2) times a day. Yes Lexus Hogan MD   allopurinoL (ZYLOPRIM) 100 mg tablet Take 200 mg by mouth daily. Yes Samira, MD Lexus   ascorbic acid, vitamin C, (VITAMIN C) 500 mg tablet Take 500 mg by mouth daily.    Yes Lexus Hogan MD   calcitRIOL (ROCALTROL) 0.25 mcg capsule Take 0.25 mcg by mouth daily. Yes Samira, MD Lexus   cholecalciferol (VITAMIN D3) (2,000 UNITS /50 MCG) cap capsule Take 2,000 Units by mouth two (2) times a day. Take two tabs a total of 4000 units   Yes Samira, MD Lexus   latanoprost (XALATAN) 0.005 % ophthalmic solution Administer 1 Drop to both eyes nightly. One drop at bedtime   Yes Lexus Hogan MD   levothyroxine (SYNTHROID) 125 mcg tablet Take 125 mcg by mouth Daily (before breakfast). Yes Samira, MD Lexus   omeprazole (PRILOSEC) 20 mg capsule Take 20 mg by mouth daily. Yes Lexus Hogan MD   ondansetron (ZOFRAN ODT) 4 mg disintegrating tablet Take 4 mg by mouth every eight (8) hours as needed for Nausea or Vomiting. Yes Samira, MD Lexus   vit B Cmplx 3-FA-Vit C-Biotin (NEPHRO HOWIE RX) 1- mg-mg-mcg tablet Take 1 Tab by mouth daily. Yes Samira, MD Lexus       Allergies:       Allergies   Allergen Reactions    Ciprofloxacin Hives    Statins-Hmg-Coa Reductase Inhibitors Other (comments)     Body ache       Comorbid Conditions:    Past Medical History:   Diagnosis Date    Acidosis     Anemia     Arteriovenous fistula (HCC)     CKD (chronic kidney disease)     Diabetes (HCC)     HLD (hyperlipidemia)     HTN (hypertension)     Hyperparathyroidism due to renal insufficiency (HCC)     Hypothyroid     Kidney stone     Lung mass     Recurrent UTI     Ureter, stricture     Uric acid nephrolithiasis     Urinary incontinence           Past Surgical History:   Procedure Laterality Date    HX APPENDECTOMY      HX CHOLECYSTECTOMY      HX GASTRIC BYPASS      HX KNEE ARTHROSCOPY      HX UROLOGICAL      right PCN placement    HX UROLOGICAL  07/23/2018    RIGHT URETEROSCOPY WITH HOLMIUM LASER    IR EXCHANGE NEPHRO PERC LT SI  2/21/2020    IR EXCHANGE NEPHRO PERC RT SI  4/13/2020    IR EXCHANGE NEPHRO PERC RT SI  7/17/2020    IR NEPHROURETERAL PERC RT PLC CATH NEW ACCESS  SI  4/30/2020    OK INTRO CATH DIALYSIS CIRCUIT DX ANGRPH FLUOR S&I Left 9/24/2020    FISTULOGRAM LEFT/poss permanent catheter placement performed by Apolinar De La Torre MD at [address] [facility] LAB     Data:    Visit Vitals  BP (!) 124/54   Pulse 82   Temp 98.7 °F (37.1 °C)   Resp 13   Ht 5' 2\" (1.575 m)   Wt 98 kg (216 lb 0.8 oz)   SpO2 96%   BMI 39.52 kg/m²   :  Recent Labs     10/14/20  1156        Recent Labs     10/14/20  1156   INR 1.1   APTT 35.4       The H & P and/or progress notes and any available imaging were reviewed. The risks, indications and possible alternatives to the procedure, including doing nothing, were discussed and informed consent was obtained. Physical Exam:      Mental status:   Alert and oriented. Examination specific to the procedure proposed to be performed and any co morbid conditions:   Mallampati classification 2 ,  ASA 3   Heart:   Regular rate. Lungs:   Normal respiratory effort. Symmetrical rise and fall of chest    The patient is an appropriate candidate to undergo the planned procedure and sedation.     [place], [place]

## 2020-10-15 ENCOUNTER — HOME CARE VISIT (OUTPATIENT)
Dept: SCHEDULING | Facility: HOME HEALTH | Age: 75
End: 2020-10-15
Payer: MEDICARE

## 2020-10-15 ENCOUNTER — HOME CARE VISIT (OUTPATIENT)
Dept: HOME HEALTH SERVICES | Facility: HOME HEALTH | Age: 75
End: 2020-10-15
Payer: MEDICARE

## 2020-10-15 PROCEDURE — G0299 HHS/HOSPICE OF RN EA 15 MIN: HCPCS

## 2020-10-16 NOTE — PROCEDURES
RADIOLOGY POST PROCEDURE NOTE     October 16, 2020       2:08 PM     Preoperative Diagnosis:exchange NU for 8 fr double J    Postoperative Diagnosis:  Same. :  Dr. Linda Soliman    Assistant:  None. Type of Anesthesia: 1% plain lidocaine,   moderate sedation    Procedure/Description: fluoro guided    Findings:  Same.     Estimated blood Loss:  Minimal    Specimen Removed: no    Blood loss:  Minimal    Implants:  8 fr double J    Complications: None    Condition: Stable    Discharge Plan:  Return to floor    Paul Carroll MD

## 2020-10-17 VITALS
SYSTOLIC BLOOD PRESSURE: 120 MMHG | DIASTOLIC BLOOD PRESSURE: 82 MMHG | HEART RATE: 92 BPM | TEMPERATURE: 97 F | OXYGEN SATURATION: 98 % | RESPIRATION RATE: 20 BRPM

## 2020-10-23 DIAGNOSIS — N18.9 ANEMIA OF CHRONIC RENAL FAILURE, UNSPECIFIED CKD STAGE: ICD-10-CM

## 2020-10-23 DIAGNOSIS — D63.1 ANEMIA OF CHRONIC RENAL FAILURE, UNSPECIFIED CKD STAGE: ICD-10-CM

## 2020-10-29 ENCOUNTER — HOSPITAL ENCOUNTER (OUTPATIENT)
Dept: GENERAL RADIOLOGY | Age: 75
Discharge: HOME | End: 2020-10-29
Payer: MEDICARE

## 2020-10-29 ENCOUNTER — TRANSCRIBE ORDER (OUTPATIENT)
Dept: REGISTRATION | Age: 75
End: 2020-10-29

## 2020-10-29 DIAGNOSIS — M54.9 DORSALGIA: Primary | ICD-10-CM

## 2020-10-29 DIAGNOSIS — M54.9 DORSALGIA: ICD-10-CM

## 2020-10-29 PROCEDURE — 72070 X-RAY EXAM THORAC SPINE 2VWS: CPT

## 2020-10-29 PROCEDURE — 72100 X-RAY EXAM L-S SPINE 2/3 VWS: CPT

## 2020-11-06 DIAGNOSIS — D63.1 ANEMIA OF CHRONIC RENAL FAILURE, UNSPECIFIED CKD STAGE: ICD-10-CM

## 2020-11-06 DIAGNOSIS — N18.9 ANEMIA OF CHRONIC RENAL FAILURE, UNSPECIFIED CKD STAGE: ICD-10-CM

## 2020-11-18 ENCOUNTER — TELEPHONE (OUTPATIENT)
Dept: VASCULAR SURGERY | Age: 75
End: 2020-11-18

## 2020-11-18 DIAGNOSIS — N18.5 CKD (CHRONIC KIDNEY DISEASE) STAGE 5, GFR LESS THAN 15 ML/MIN (HCC): Primary | ICD-10-CM

## 2020-11-18 DIAGNOSIS — N18.5 CKD (CHRONIC KIDNEY DISEASE) STAGE 5, GFR LESS THAN 15 ML/MIN (HCC): ICD-10-CM

## 2020-11-18 NOTE — TELEPHONE ENCOUNTER
Received message from Dialysis that pt is having numbness in hand during dialysis and low clearance issue, discussed with doreen order for ARUN placed to check for steal .   Order for ARUN placed per verbal order from NOVA Christopher   Gave to [facility] to scheduled.

## 2020-11-20 DIAGNOSIS — N18.9 ANEMIA OF CHRONIC RENAL FAILURE, UNSPECIFIED CKD STAGE: ICD-10-CM

## 2020-11-20 DIAGNOSIS — D63.1 ANEMIA OF CHRONIC RENAL FAILURE, UNSPECIFIED CKD STAGE: ICD-10-CM

## 2020-11-24 ENCOUNTER — TELEPHONE (OUTPATIENT)
Dept: VASCULAR SURGERY | Age: 75
End: 2020-11-24

## 2020-11-24 NOTE — TELEPHONE ENCOUNTER
Discussed results with Alan Sterling and per doreen results are virtually normal not enough to say jimmy, advised that per pt they have not been able to do dialysis for the last 2 treatments due to clotting . Pt will need to be sent out to another facility for intervention due to our limited availability. Called and spoke with Kalee Pike at the unit and she will make arrangements , called son and made him aware of the results and plan.

## 2020-12-04 DIAGNOSIS — N18.9 ANEMIA OF CHRONIC RENAL FAILURE, UNSPECIFIED CKD STAGE: ICD-10-CM

## 2020-12-04 DIAGNOSIS — D63.1 ANEMIA OF CHRONIC RENAL FAILURE, UNSPECIFIED CKD STAGE: ICD-10-CM

## 2020-12-24 ENCOUNTER — OFFICE VISIT (OUTPATIENT)
Dept: CARDIOLOGY CLINIC | Age: 75
End: 2020-12-24
Payer: MEDICARE

## 2020-12-24 VITALS
DIASTOLIC BLOOD PRESSURE: 52 MMHG | HEART RATE: 101 BPM | SYSTOLIC BLOOD PRESSURE: 105 MMHG | OXYGEN SATURATION: 97 % | BODY MASS INDEX: 38.76 KG/M2 | TEMPERATURE: 97.4 F | HEIGHT: 62 IN | WEIGHT: 210.6 LBS

## 2020-12-24 DIAGNOSIS — I95.0 IDIOPATHIC HYPOTENSION: ICD-10-CM

## 2020-12-24 DIAGNOSIS — N18.6 ESRD (END STAGE RENAL DISEASE) (HCC): ICD-10-CM

## 2020-12-24 DIAGNOSIS — I10 ESSENTIAL HYPERTENSION: Primary | ICD-10-CM

## 2020-12-24 DIAGNOSIS — R06.02 SHORTNESS OF BREATH: ICD-10-CM

## 2020-12-24 DIAGNOSIS — R07.89 OTHER CHEST PAIN: ICD-10-CM

## 2020-12-24 PROCEDURE — G8427 DOCREV CUR MEDS BY ELIG CLIN: HCPCS | Performed by: INTERNAL MEDICINE

## 2020-12-24 PROCEDURE — G8400 PT W/DXA NO RESULTS DOC: HCPCS | Performed by: INTERNAL MEDICINE

## 2020-12-24 PROCEDURE — 99214 OFFICE O/P EST MOD 30 MIN: CPT | Performed by: INTERNAL MEDICINE

## 2020-12-24 PROCEDURE — G8432 DEP SCR NOT DOC, RNG: HCPCS | Performed by: INTERNAL MEDICINE

## 2020-12-24 PROCEDURE — G8536 NO DOC ELDER MAL SCRN: HCPCS | Performed by: INTERNAL MEDICINE

## 2020-12-24 PROCEDURE — G8417 CALC BMI ABV UP PARAM F/U: HCPCS | Performed by: INTERNAL MEDICINE

## 2020-12-24 PROCEDURE — 1101F PT FALLS ASSESS-DOCD LE1/YR: CPT | Performed by: INTERNAL MEDICINE

## 2020-12-24 PROCEDURE — 1090F PRES/ABSN URINE INCON ASSESS: CPT | Performed by: INTERNAL MEDICINE

## 2020-12-24 RX ORDER — MIDODRINE HYDROCHLORIDE 2.5 MG/1
2.5 TABLET ORAL 2 TIMES DAILY
Qty: 180 TAB | Refills: 1 | Status: SHIPPED | OUTPATIENT
Start: 2020-12-24 | End: 2021-06-21

## 2020-12-24 RX ORDER — SERTRALINE HYDROCHLORIDE 25 MG/1
25 TABLET, FILM COATED ORAL DAILY
COMMUNITY
End: 2021-06-21

## 2020-12-24 NOTE — PROGRESS NOTES
HISTORY OF PRESENT ILLNESS  Suyapa Chandra is a 66 y.o. female. Patient referred for labile blood pressures. She has noted elevated b/p during recent vascular procedure with chest pressure and procedure was aborted at that time. She was also SOB and resolved with oxygen. She c/o recent episodes of hypotension and was Rx Midodrine. She reports this caused chest pain, thus she stopped taking it. She has HD Mon/Fri weekly. Hypertension  The history is provided by the patient and relative. This is a chronic problem. The current episode started more than 1 week ago. The problem occurs every several days. Associated symptoms include chest pain and shortness of breath. Pertinent negatives include no abdominal pain and no headaches. She has tried nothing for the symptoms. Review of Systems   Constitutional: Positive for malaise/fatigue. Negative for chills and fever. HENT: Negative for congestion and nosebleeds. Eyes: Negative for blurred vision, double vision and redness. Respiratory: Positive for shortness of breath. Negative for cough, hemoptysis, sputum production and wheezing. Cardiovascular: Positive for chest pain. Negative for palpitations, orthopnea, claudication, leg swelling and PND. Gastrointestinal: Negative for abdominal pain, heartburn, nausea and vomiting. Musculoskeletal: Negative for falls and myalgias. Skin: Negative for rash. Neurological: Negative for dizziness, weakness and headaches. Endo/Heme/Allergies: Does not bruise/bleed easily.      Family History   Problem Relation Age of Onset    Heart Surgery Sister        Past Medical History:   Diagnosis Date    Acidosis     Anemia     [facility] [address]     CKD (chronic kidney disease)     Diabetes ([facility] [address]     HLD (hyperlipidemia)     HTN (hypertension)     Hyperparathyroidism due to renal insufficiency (HCC)     Hypothyroid     Kidney stone     Lung mass     Recurrent UTI     Ureter, stricture     Uric acid nephrolithiasis     Urinary incontinence        Past Surgical History:   Procedure Laterality Date    HX APPENDECTOMY      HX CHOLECYSTECTOMY      HX GASTRIC BYPASS      HX KNEE ARTHROSCOPY      HX UROLOGICAL      right PCN placement    HX UROLOGICAL  07/23/2018    RIGHT URETEROSCOPY WITH HOLMIUM LASER    IR EXCHANGE NEPHRO PERC LT SI  2/21/2020    IR EXCHANGE NEPHRO PERC RT SI  4/13/2020    IR EXCHANGE NEPHRO PERC RT SI  7/17/2020    IR NEPHROSTOMY PERC RT PLC CATH  SI  10/14/2020    IR NEPHROURETERAL PERC RT PLC CATH NEW ACCESS  SI  4/30/2020    CA INTRO CATH DIALYSIS CIRCUIT DX ANGRPH FLUOR S&I Left 9/24/2020    FISTULOGRAM LEFT/poss permanent catheter placement performed by Shyanne Andrade MD at [facility] CATH LAB       Social History     Tobacco Use    Smoking status: Never Smoker    Smokeless tobacco: Never Used   Substance Use Topics    Alcohol use: Never     Frequency: Never       Allergies   Allergen Reactions    Ciprofloxacin Hives    Statins-Hmg-Coa Reductase Inhibitors Other (comments)     Body ache       Prior to Admission medications    Medication Sig Start Date End Date Taking? Authorizing Provider   sertraline (Zoloft) 25 mg tablet Take 25 mg by mouth daily. Yes Provider, Historical   midodrine (PROAMATINE) 2.5 mg tablet Take 1 Tab by mouth two (2) times a day for 180 days. 12/24/20 6/22/21 Yes Juanito Griffith MD   b complex vitamins (Vitamins B Complex) tablet Take 1 Tab by mouth daily. Yes Provider, Historical   estradioL (Estrace) 0.01 % (0.1 mg/gram) vaginal cream Apply a fingertip amount around the urethra three times a week. 9/30/20  Yes Nasra Moore MD   hydrALAZINE (APRESOLINE) 10 mg tablet Take 1 Tab by mouth four (4) times daily. 9/2/20  Yes Thuy Suarez NP   biotin 1,000 mcg chew Take 1 Tab by mouth daily. Yes Provider, Historical   cyanocobalamin 1,000 mcg tablet Take 1,000 mcg by mouth daily.    Yes Provider, Historical   gabapentin (NEURONTIN) 100 mg capsule Take 100 mg by mouth nightly. AS needed   Yes Provider, Historical   lactobacillus sp. 50 billion cpu (BIO-K PLUS) 50 billion cell -375 mg cap capsule Take 1 Cap by mouth daily. 2/25/20  Yes Ernestine Osorio MD   tamsulosin (FLOMAX) 0.4 mg capsule Take 1 Cap by mouth daily. 2/25/20  Yes Ernestine Osorio MD   sodium bicarbonate 650 mg tablet Take 2 Tabs by mouth three (3) times daily. Indications: excess body acid  Patient taking differently: Take 1,300 mg by mouth two (2) times a day. 3 tabs bid  Indications: excess body acid 2/24/20  Yes Ernestine Osorio MD   acetaminophen (TYLENOL) 325 mg tablet Take 650 mg by mouth two (2) times a day. Yes Samira, MD Lexus   allopurinoL (ZYLOPRIM) 100 mg tablet Take 200 mg by mouth daily. Yes Samira, MD Lexus   ascorbic acid, vitamin C, (VITAMIN C) 500 mg tablet Take 500 mg by mouth daily. Yes Samira, MD Lexus   calcitRIOL (ROCALTROL) 0.25 mcg capsule Take 0.25 mcg by mouth daily. Yes Samira, MD Lexus   cholecalciferol (VITAMIN D3) (2,000 UNITS /50 MCG) cap capsule Take 2,000 Units by mouth two (2) times a day. Take two tabs a total of 4000 units   Yes Lexus Hogan MD   latanoprost (XALATAN) 0.005 % ophthalmic solution Administer 1 Drop to both eyes nightly. One drop at bedtime   Yes Lexus Hogan MD   levothyroxine (SYNTHROID) 125 mcg tablet Take 125 mcg by mouth Daily (before breakfast). Yes Samira, MD Lexus   omeprazole (PRILOSEC) 20 mg capsule Take 20 mg by mouth daily. Yes Samira, MD Lexus   ondansetron (ZOFRAN ODT) 4 mg disintegrating tablet Take 4 mg by mouth every eight (8) hours as needed for Nausea or Vomiting. Yes Samira, MD Lexus   vit B Cmplx 3-FA-Vit C-Biotin (NEPHRO HOWIE RX) 1- mg-mg-mcg tablet Take 1 Tab by mouth daily.    Yes Samira, MD Lexus         Visit Vitals  BP (!) 105/52 (BP 1 Location: Left arm, BP Patient Position: Sitting)   Pulse (!) 101   Temp 97.4 °F (36.3 °C) (Temporal)   Ht 5' 2\" (1.575 m)   Wt 95.5 kg (210 lb 9.6 oz) SpO2 97%   BMI 38.52 kg/m²       Physical Exam   Constitutional: She is oriented to person, place, and time. She appears well-developed and well-nourished. Neck: No JVD present. Cardiovascular: Normal rate, regular rhythm, normal heart sounds and intact distal pulses. Exam reveals no gallop and no friction rub. No murmur heard. Pulmonary/Chest: Effort normal and breath sounds normal. No respiratory distress. She has no wheezes. She has no rales. She exhibits no tenderness. Abdominal: Soft. She exhibits no distension and no mass. There is no abdominal tenderness. Musculoskeletal: Normal range of motion. General: Edema (trace pedal edema) present. Neurological: She is alert and oriented to person, place, and time. Skin: Skin is warm and dry. Psychiatric: She has a normal mood and affect. Nursing note and vitals reviewed. Echo 2018  Normal left ventricular size and systolic function. · Estimated LVEF is approximately 61%. · Grossly normal right ventricular size and function. · No significant valvular abnormalities. · Borderline mild pulmonary hypertension. · No prior report for comparison. ASSESSMENT and PLAN  Ms. June Drake has a reminder for a \"due or due soon\" health maintenance. I have asked that she contact her primary care provider for follow-up on this health maintenance. No flowsheet data found. Assessment   I Have personally reviewed recent relevant labs available and discussed with patient  I have personally reviewed patients ekg done at other facility. Procedure Conclusion 11/2020  Nuclear Stress Test    Nuclear Cardiac Spect Rest then Gated Stress study. Ether Ramona was used as the stressing method and agent. (Ether Ramona given via a 10 - 20 sec injection.)   One day myocardial perfusion study. Date: 11/11/2020. Left ventricular perfusion is normal. Myocardial perfusion imaging supports a low risk stress test.   There is no prior study available for comparison. Tammi Weiner Interpretation Summary 11/2020 echocardiogram    · LV: Estimated LVEF is 55 - 60%. Normal cavity size, systolic function (ejection fraction normal) and diastolic function. Mild to moderate hypertrophy. Wall motion: normal.  · LA: Left Atrium volume index is 31.48 mL/m2. · PA: Pulmonary arterial systolic pressure is 22 mmHg           ICD-10-CM ICD-9-CM    1. Essential hypertension  I10 401.9     Currently on low side. She has hydralazine but does not take it at present. 2. Idiopathic hypotension  I95.0 458.9     Episodes of hypertension with dialysis as well as without it and feels weak. Add midodrine   3. ESRD (end stage renal disease) ([facility] [address]  N18.6 585.6     Continue treatment monitor   4. Other chest pain  R07.89 786.59     Atypical, negative nuclear stress test monitor clinically   5. Shortness of breath  R06.02 786.05     Normal ejection fraction no pulmonary hypertension monitor     9/2020 Patient referred for labile b/p. She c/o shortness of breath with chest pain at times when b/p is elevated. Will evaluate for ischemia with stress test and echo. Rx Hydralazine every 6 hours as needed for b/p > 140. Instructed to monitor b/p BID and log - to send log to office in 1 week. If hypotension documented, will consider florinef since she had adverse reaction to Midodrine. 12/2020  Patient seen for follow-up. Feels fatigue tired. Worse on dialysis day. Blood pressure on low side I have added midodrine 2.5 mg twice a day. If unable to tolerate consider Florinef. Cardiac testing unremarkable  There are no discontinued medications. Orders Placed This Encounter    midodrine (PROAMATINE) 2.5 mg tablet     Sig: Take 1 Tab by mouth two (2) times a day for 180 days. Dispense:  180 Tab     Refill:  1       Follow-up and Dispositions    · Return in about 3 months (around 3/24/2021).            Anum Hopson MD

## 2020-12-24 NOTE — PROGRESS NOTES
1. Have you been to the ER, urgent care clinic since your last visit? Hospitalized since your last visit?     no    2. Have you seen or consulted any other health care providers outside of the [address] [place] [address] since your last visit? Include any pap smears or colon screening. Yes Where: x 1 wk ago with PCP     3. Do you need any refills today?    no

## 2021-01-01 DIAGNOSIS — N18.9 ANEMIA OF CHRONIC RENAL FAILURE, UNSPECIFIED CKD STAGE: ICD-10-CM

## 2021-01-01 DIAGNOSIS — D63.1 ANEMIA OF CHRONIC RENAL FAILURE, UNSPECIFIED CKD STAGE: ICD-10-CM

## 2021-01-07 ENCOUNTER — HOSPITAL ENCOUNTER (OUTPATIENT)
Dept: CT IMAGING | Age: 76
Discharge: HOME | End: 2021-01-07
Attending: UROLOGY
Payer: MEDICARE

## 2021-01-07 DIAGNOSIS — T83.092A OBSTRUCTED NEPHROSTOMY TUBE (HCC): ICD-10-CM

## 2021-01-07 DIAGNOSIS — N20.0 KIDNEY STONE: ICD-10-CM

## 2021-01-07 PROCEDURE — 74176 CT ABD & PELVIS W/O CONTRAST: CPT

## 2021-01-08 NOTE — PROGRESS NOTES
Let her know CT looks good. Stent is in good position. Did the antibiotics help her? If not, come back for CATHED standard culture + microgen.  Thanks

## 2021-01-08 NOTE — PROGRESS NOTES
ALSO! She is due for stent exchange. Cezar Campos please write letter for stent change. 45 minutes. Ceftriaxone 2gm. Go ahead and get that repeat cathed culture. NO clearance. MAC. Ok to stay on blood thinners. Damion Harrington - can we get her on and see me any time prior thanks?

## 2021-01-15 DIAGNOSIS — D63.1 ANEMIA OF CHRONIC RENAL FAILURE, UNSPECIFIED CKD STAGE: ICD-10-CM

## 2021-01-15 DIAGNOSIS — N18.9 ANEMIA OF CHRONIC RENAL FAILURE, UNSPECIFIED CKD STAGE: ICD-10-CM

## 2021-01-22 ENCOUNTER — TELEPHONE (OUTPATIENT)
Dept: CARDIOLOGY CLINIC | Age: 76
End: 2021-01-22

## 2021-01-22 NOTE — TELEPHONE ENCOUNTER
Patient called and states that she thinks her Midodrine is causing her to shake and since stopping it 2 days ago she's started feeling better, ARETHA

## 2021-01-22 NOTE — TELEPHONE ENCOUNTER
Called and left message on patient per Dr. Jeffries Staff, stable on midodrine ans monitor blood pressure if it runs low we will consider alternate medication. If any questions to call office.

## 2021-01-26 ENCOUNTER — TELEPHONE (OUTPATIENT)
Dept: CARDIOLOGY CLINIC | Age: 76
End: 2021-01-26

## 2021-01-26 DIAGNOSIS — I95.0 IDIOPATHIC HYPOTENSION: Primary | ICD-10-CM

## 2021-01-26 NOTE — TELEPHONE ENCOUNTER
Patient's son states there is a miss understanding about the message that was taken on 1/22/21.  Please call her son to discuss this

## 2021-01-27 RX ORDER — FLUDROCORTISONE ACETATE 0.1 MG/1
0.1 TABLET ORAL DAILY
Qty: 30 TAB | Refills: 5 | Status: SHIPPED | OUTPATIENT
Start: 2021-01-27 | End: 2021-10-14

## 2021-01-27 NOTE — TELEPHONE ENCOUNTER
Discontinue midodrine.   I have started Florinef 0.1 mg a day BMP in 1 week after starting the medicine

## 2021-01-29 DIAGNOSIS — N18.9 ANEMIA OF CHRONIC RENAL FAILURE, UNSPECIFIED CKD STAGE: ICD-10-CM

## 2021-01-29 DIAGNOSIS — D63.1 ANEMIA OF CHRONIC RENAL FAILURE, UNSPECIFIED CKD STAGE: ICD-10-CM

## 2021-02-02 PROBLEM — R11.2 NAUSEA & VOMITING: Status: ACTIVE | Noted: 2021-02-02

## 2021-02-16 ENCOUNTER — HOSPITAL ENCOUNTER (OUTPATIENT)
Dept: PREADMISSION TESTING | Age: 76
Discharge: HOME | End: 2021-02-16
Payer: MEDICARE

## 2021-02-16 ENCOUNTER — TRANSCRIBE ORDER (OUTPATIENT)
Dept: REGISTRATION | Age: 76
End: 2021-02-16

## 2021-02-16 DIAGNOSIS — Z01.818 PRE-OP EXAM: ICD-10-CM

## 2021-02-16 DIAGNOSIS — Z01.818 PRE-OP EXAM: Primary | ICD-10-CM

## 2021-02-16 LAB
ANION GAP SERPL CALC-SCNC: 9 MMOL/L (ref 3–18)
ATRIAL RATE: 96 BPM
BUN SERPL-MCNC: 29 MG/DL (ref 7–18)
BUN/CREAT SERPL: 5 (ref 12–20)
CALCIUM SERPL-MCNC: 8.6 MG/DL (ref 8.5–10.1)
CALCULATED P AXIS, ECG09: 69 DEGREES
CALCULATED R AXIS, ECG10: -19 DEGREES
CALCULATED T AXIS, ECG11: 47 DEGREES
CHLORIDE SERPL-SCNC: 98 MMOL/L (ref 100–111)
CO2 SERPL-SCNC: 31 MMOL/L (ref 21–32)
CREAT SERPL-MCNC: 5.79 MG/DL (ref 0.6–1.3)
DIAGNOSIS, 93000: NORMAL
ERYTHROCYTE [DISTWIDTH] IN BLOOD BY AUTOMATED COUNT: 17.1 % (ref 11.6–14.5)
GLUCOSE SERPL-MCNC: 85 MG/DL (ref 74–99)
HCT VFR BLD AUTO: 32.8 % (ref 35–45)
HGB BLD-MCNC: 9.8 G/DL (ref 12–16)
MCH RBC QN AUTO: 27.9 PG (ref 24–34)
MCHC RBC AUTO-ENTMCNC: 29.9 G/DL (ref 31–37)
MCV RBC AUTO: 93.4 FL (ref 74–97)
P-R INTERVAL, ECG05: 176 MS
PLATELET # BLD AUTO: 303 K/UL (ref 135–420)
PMV BLD AUTO: 9.8 FL (ref 9.2–11.8)
POTASSIUM SERPL-SCNC: 4.9 MMOL/L (ref 3.5–5.5)
Q-T INTERVAL, ECG07: 348 MS
QRS DURATION, ECG06: 86 MS
QTC CALCULATION (BEZET), ECG08: 439 MS
RBC # BLD AUTO: 3.51 M/UL (ref 4.2–5.3)
SODIUM SERPL-SCNC: 138 MMOL/L (ref 136–145)
VENTRICULAR RATE, ECG03: 96 BPM
WBC # BLD AUTO: 8.6 K/UL (ref 4.6–13.2)

## 2021-02-16 PROCEDURE — 36415 COLL VENOUS BLD VENIPUNCTURE: CPT

## 2021-02-16 PROCEDURE — 85027 COMPLETE CBC AUTOMATED: CPT

## 2021-02-16 PROCEDURE — 80048 BASIC METABOLIC PNL TOTAL CA: CPT

## 2021-02-16 PROCEDURE — 93005 ELECTROCARDIOGRAM TRACING: CPT

## 2021-02-23 ENCOUNTER — TRANSCRIBE ORDER (OUTPATIENT)
Dept: SCHEDULING | Age: 76
End: 2021-02-23

## 2021-02-23 DIAGNOSIS — M48.062 SPINAL STENOSIS, LUMBAR REGION, WITH NEUROGENIC CLAUDICATION: Primary | ICD-10-CM

## 2021-02-25 ENCOUNTER — HOSPITAL ENCOUNTER (OUTPATIENT)
Dept: PREADMISSION TESTING | Age: 76
Discharge: HOME | End: 2021-02-25
Payer: MEDICARE

## 2021-02-25 PROCEDURE — U0003 INFECTIOUS AGENT DETECTION BY NUCLEIC ACID (DNA OR RNA); SEVERE ACUTE RESPIRATORY SYNDROME CORONAVIRUS 2 (SARS-COV-2) (CORONAVIRUS DISEASE [COVID-19]), AMPLIFIED PROBE TECHNIQUE, MAKING USE OF HIGH THROUGHPUT TECHNOLOGIES AS DESCRIBED BY CMS-2020-01-R: HCPCS

## 2021-02-26 LAB — SARS-COV-2, COV2NT: NOT DETECTED

## 2021-03-01 ENCOUNTER — ANESTHESIA EVENT (OUTPATIENT)
Dept: SURGERY | Age: 76
End: 2021-03-01
Payer: MEDICARE

## 2021-03-02 ENCOUNTER — APPOINTMENT (OUTPATIENT)
Dept: GENERAL RADIOLOGY | Age: 76
End: 2021-03-02
Attending: UROLOGY
Payer: MEDICARE

## 2021-03-02 ENCOUNTER — HOSPITAL ENCOUNTER (OUTPATIENT)
Age: 76
Discharge: HOME | End: 2021-03-02
Attending: UROLOGY | Admitting: UROLOGY
Payer: MEDICARE

## 2021-03-02 ENCOUNTER — ANESTHESIA (OUTPATIENT)
Dept: SURGERY | Age: 76
End: 2021-03-02
Payer: MEDICARE

## 2021-03-02 VITALS
DIASTOLIC BLOOD PRESSURE: 60 MMHG | OXYGEN SATURATION: 100 % | SYSTOLIC BLOOD PRESSURE: 105 MMHG | TEMPERATURE: 97.2 F | WEIGHT: 213.8 LBS | HEART RATE: 55 BPM | BODY MASS INDEX: 39.34 KG/M2 | HEIGHT: 62 IN | RESPIRATION RATE: 20 BRPM

## 2021-03-02 PROBLEM — N39.9: Status: ACTIVE | Noted: 2021-03-02

## 2021-03-02 LAB
ANION GAP SERPL CALC-SCNC: 7 MMOL/L (ref 3–18)
BUN SERPL-MCNC: 22 MG/DL (ref 7–18)
BUN/CREAT SERPL: 4 (ref 12–20)
CALCIUM SERPL-MCNC: 8.9 MG/DL (ref 8.5–10.1)
CHLORIDE SERPL-SCNC: 106 MMOL/L (ref 100–111)
CO2 SERPL-SCNC: 28 MMOL/L (ref 21–32)
CREAT SERPL-MCNC: 5.7 MG/DL (ref 0.6–1.3)
GLUCOSE BLD STRIP.AUTO-MCNC: 83 MG/DL (ref 70–110)
GLUCOSE BLD STRIP.AUTO-MCNC: 87 MG/DL (ref 70–110)
GLUCOSE SERPL-MCNC: 84 MG/DL (ref 74–99)
POTASSIUM SERPL-SCNC: 5 MMOL/L (ref 3.5–5.5)
SODIUM SERPL-SCNC: 141 MMOL/L (ref 136–145)

## 2021-03-02 PROCEDURE — 74011250636 HC RX REV CODE- 250/636: Performed by: NURSE ANESTHETIST, CERTIFIED REGISTERED

## 2021-03-02 PROCEDURE — 00910 ANES TRANSURETHRAL PX NOS: CPT | Performed by: ANESTHESIOLOGY

## 2021-03-02 PROCEDURE — 77030013079 HC BLNKT BAIR HGGR 3M -A: Performed by: ANESTHESIOLOGY

## 2021-03-02 PROCEDURE — 76010000138 HC OR TIME 0.5 TO 1 HR: Performed by: UROLOGY

## 2021-03-02 PROCEDURE — 00910 ANES TRANSURETHRAL PX NOS: CPT | Performed by: NURSE ANESTHETIST, CERTIFIED REGISTERED

## 2021-03-02 PROCEDURE — 77030020268 HC MISC GENERAL SUPPLY: Performed by: UROLOGY

## 2021-03-02 PROCEDURE — 74011250636 HC RX REV CODE- 250/636: Performed by: UROLOGY

## 2021-03-02 PROCEDURE — 99100 ANES PT EXTEME AGE<1 YR&>70: CPT | Performed by: NURSE ANESTHETIST, CERTIFIED REGISTERED

## 2021-03-02 PROCEDURE — 99100 ANES PT EXTEME AGE<1 YR&>70: CPT | Performed by: ANESTHESIOLOGY

## 2021-03-02 PROCEDURE — 76210000006 HC OR PH I REC 0.5 TO 1 HR: Performed by: UROLOGY

## 2021-03-02 PROCEDURE — 74011000250 HC RX REV CODE- 250: Performed by: UROLOGY

## 2021-03-02 PROCEDURE — 2709999900 HC NON-CHARGEABLE SUPPLY: Performed by: UROLOGY

## 2021-03-02 PROCEDURE — 74011250637 HC RX REV CODE- 250/637: Performed by: NURSE ANESTHETIST, CERTIFIED REGISTERED

## 2021-03-02 PROCEDURE — 77030040361 HC SLV COMPR DVT MDII -B: Performed by: UROLOGY

## 2021-03-02 PROCEDURE — 76060000032 HC ANESTHESIA 0.5 TO 1 HR: Performed by: UROLOGY

## 2021-03-02 PROCEDURE — 74011000250 HC RX REV CODE- 250: Performed by: ANESTHESIOLOGY

## 2021-03-02 PROCEDURE — 87086 URINE CULTURE/COLONY COUNT: CPT

## 2021-03-02 PROCEDURE — 74011000250 HC RX REV CODE- 250: Performed by: NURSE ANESTHETIST, CERTIFIED REGISTERED

## 2021-03-02 PROCEDURE — 76210000021 HC REC RM PH II 0.5 TO 1 HR: Performed by: UROLOGY

## 2021-03-02 PROCEDURE — 74420 UROGRAPHY RTRGR +-KUB: CPT

## 2021-03-02 PROCEDURE — 80048 BASIC METABOLIC PNL TOTAL CA: CPT

## 2021-03-02 PROCEDURE — 77030019927 HC TBNG IRR CYSTO BAXT -A: Performed by: UROLOGY

## 2021-03-02 PROCEDURE — 82962 GLUCOSE BLOOD TEST: CPT

## 2021-03-02 DEVICE — URETERAL STENT SET
Type: IMPLANTABLE DEVICE | Site: URETER | Status: FUNCTIONAL
Brand: POLARIS™ ULTRA

## 2021-03-02 RX ORDER — SODIUM CHLORIDE 0.9 % (FLUSH) 0.9 %
5-40 SYRINGE (ML) INJECTION EVERY 8 HOURS
Status: DISCONTINUED | OUTPATIENT
Start: 2021-03-02 | End: 2021-03-02 | Stop reason: HOSPADM

## 2021-03-02 RX ORDER — PROPOFOL 10 MG/ML
VIAL (ML) INTRAVENOUS
Status: DISCONTINUED | OUTPATIENT
Start: 2021-03-02 | End: 2021-03-02 | Stop reason: HOSPADM

## 2021-03-02 RX ORDER — SODIUM CHLORIDE, SODIUM LACTATE, POTASSIUM CHLORIDE, CALCIUM CHLORIDE 600; 310; 30; 20 MG/100ML; MG/100ML; MG/100ML; MG/100ML
75 INJECTION, SOLUTION INTRAVENOUS CONTINUOUS
Status: DISCONTINUED | OUTPATIENT
Start: 2021-03-02 | End: 2021-03-02 | Stop reason: HOSPADM

## 2021-03-02 RX ORDER — FLUCONAZOLE 2 MG/ML
200 INJECTION, SOLUTION INTRAVENOUS ONCE
Status: DISCONTINUED | OUTPATIENT
Start: 2021-03-02 | End: 2021-03-02 | Stop reason: HOSPADM

## 2021-03-02 RX ORDER — PHENYLEPHRINE HCL IN 0.9% NACL 1 MG/10 ML
SYRINGE (ML) INTRAVENOUS AS NEEDED
Status: DISCONTINUED | OUTPATIENT
Start: 2021-03-02 | End: 2021-03-02 | Stop reason: HOSPADM

## 2021-03-02 RX ORDER — FENTANYL CITRATE 50 UG/ML
25 INJECTION, SOLUTION INTRAMUSCULAR; INTRAVENOUS AS NEEDED
Status: DISCONTINUED | OUTPATIENT
Start: 2021-03-02 | End: 2021-03-02 | Stop reason: HOSPADM

## 2021-03-02 RX ORDER — SODIUM CHLORIDE 0.9 % (FLUSH) 0.9 %
5-40 SYRINGE (ML) INJECTION AS NEEDED
Status: DISCONTINUED | OUTPATIENT
Start: 2021-03-02 | End: 2021-03-02 | Stop reason: HOSPADM

## 2021-03-02 RX ORDER — WATER FOR INJECTION,STERILE
VIAL (ML) INJECTION
Status: DISCONTINUED
Start: 2021-03-02 | End: 2021-03-02 | Stop reason: HOSPADM

## 2021-03-02 RX ORDER — PROPOFOL 10 MG/ML
INJECTION, EMULSION INTRAVENOUS AS NEEDED
Status: DISCONTINUED | OUTPATIENT
Start: 2021-03-02 | End: 2021-03-02 | Stop reason: HOSPADM

## 2021-03-02 RX ORDER — LIDOCAINE HYDROCHLORIDE 20 MG/ML
INJECTION, SOLUTION EPIDURAL; INFILTRATION; INTRACAUDAL; PERINEURAL AS NEEDED
Status: DISCONTINUED | OUTPATIENT
Start: 2021-03-02 | End: 2021-03-02 | Stop reason: HOSPADM

## 2021-03-02 RX ORDER — MIDAZOLAM HYDROCHLORIDE 1 MG/ML
INJECTION, SOLUTION INTRAMUSCULAR; INTRAVENOUS AS NEEDED
Status: DISCONTINUED | OUTPATIENT
Start: 2021-03-02 | End: 2021-03-02 | Stop reason: HOSPADM

## 2021-03-02 RX ORDER — FAMOTIDINE 20 MG/1
20 TABLET, FILM COATED ORAL ONCE
Status: COMPLETED | OUTPATIENT
Start: 2021-03-02 | End: 2021-03-02

## 2021-03-02 RX ORDER — FENTANYL CITRATE 50 UG/ML
INJECTION, SOLUTION INTRAMUSCULAR; INTRAVENOUS AS NEEDED
Status: DISCONTINUED | OUTPATIENT
Start: 2021-03-02 | End: 2021-03-02 | Stop reason: HOSPADM

## 2021-03-02 RX ORDER — SODIUM CHLORIDE, SODIUM LACTATE, POTASSIUM CHLORIDE, CALCIUM CHLORIDE 600; 310; 30; 20 MG/100ML; MG/100ML; MG/100ML; MG/100ML
25 INJECTION, SOLUTION INTRAVENOUS CONTINUOUS
Status: DISCONTINUED | OUTPATIENT
Start: 2021-03-02 | End: 2021-03-02 | Stop reason: HOSPADM

## 2021-03-02 RX ORDER — DEXTROSE MONOHYDRATE AND SODIUM CHLORIDE 5; .225 G/100ML; G/100ML
25 INJECTION, SOLUTION INTRAVENOUS CONTINUOUS
Status: DISCONTINUED | OUTPATIENT
Start: 2021-03-02 | End: 2021-03-02 | Stop reason: HOSPADM

## 2021-03-02 RX ORDER — INSULIN LISPRO 100 [IU]/ML
INJECTION, SOLUTION INTRAVENOUS; SUBCUTANEOUS ONCE
Status: DISCONTINUED | OUTPATIENT
Start: 2021-03-02 | End: 2021-03-02 | Stop reason: HOSPADM

## 2021-03-02 RX ORDER — DEXTROSE 50 % IN WATER (D50W) INTRAVENOUS SYRINGE
25-50 AS NEEDED
Status: DISCONTINUED | OUTPATIENT
Start: 2021-03-02 | End: 2021-03-02 | Stop reason: HOSPADM

## 2021-03-02 RX ORDER — MAGNESIUM SULFATE 100 %
4 CRYSTALS MISCELLANEOUS AS NEEDED
Status: DISCONTINUED | OUTPATIENT
Start: 2021-03-02 | End: 2021-03-02 | Stop reason: HOSPADM

## 2021-03-02 RX ORDER — ONDANSETRON 2 MG/ML
4 INJECTION INTRAMUSCULAR; INTRAVENOUS ONCE
Status: DISCONTINUED | OUTPATIENT
Start: 2021-03-02 | End: 2021-03-02 | Stop reason: HOSPADM

## 2021-03-02 RX ORDER — LIDOCAINE HYDROCHLORIDE 10 MG/ML
0.1 INJECTION, SOLUTION EPIDURAL; INFILTRATION; INTRACAUDAL; PERINEURAL AS NEEDED
Status: DISCONTINUED | OUTPATIENT
Start: 2021-03-02 | End: 2021-03-02 | Stop reason: HOSPADM

## 2021-03-02 RX ADMIN — PROPOFOL 75 MCG/KG/MIN: 10 INJECTION, EMULSION INTRAVENOUS at 07:57

## 2021-03-02 RX ADMIN — PROPOFOL 20 MG: 10 INJECTION, EMULSION INTRAVENOUS at 07:57

## 2021-03-02 RX ADMIN — LIDOCAINE HYDROCHLORIDE 50 MG: 20 INJECTION, SOLUTION EPIDURAL; INFILTRATION; INTRACAUDAL; PERINEURAL at 07:57

## 2021-03-02 RX ADMIN — Medication 200 MCG: at 08:35

## 2021-03-02 RX ADMIN — PROPOFOL 30 MG: 10 INJECTION, EMULSION INTRAVENOUS at 08:09

## 2021-03-02 RX ADMIN — Medication 100 MCG: at 08:23

## 2021-03-02 RX ADMIN — MIDAZOLAM HYDROCHLORIDE 0.5 MG: 2 INJECTION, SOLUTION INTRAMUSCULAR; INTRAVENOUS at 07:57

## 2021-03-02 RX ADMIN — FENTANYL CITRATE 25 MCG: 50 INJECTION, SOLUTION INTRAMUSCULAR; INTRAVENOUS at 08:08

## 2021-03-02 RX ADMIN — FENTANYL CITRATE 25 MCG: 50 INJECTION, SOLUTION INTRAMUSCULAR; INTRAVENOUS at 08:05

## 2021-03-02 RX ADMIN — MIDAZOLAM HYDROCHLORIDE 0.5 MG: 2 INJECTION, SOLUTION INTRAMUSCULAR; INTRAVENOUS at 07:51

## 2021-03-02 RX ADMIN — WATER 2 G: 1 INJECTION INTRAMUSCULAR; INTRAVENOUS; SUBCUTANEOUS at 08:03

## 2021-03-02 RX ADMIN — FENTANYL CITRATE 25 MCG: 50 INJECTION, SOLUTION INTRAMUSCULAR; INTRAVENOUS at 07:57

## 2021-03-02 RX ADMIN — PROPOFOL 30 MG: 10 INJECTION, EMULSION INTRAVENOUS at 08:16

## 2021-03-02 RX ADMIN — Medication 100 MCG: at 08:24

## 2021-03-02 RX ADMIN — FAMOTIDINE 20 MG: 20 TABLET ORAL at 07:20

## 2021-03-02 RX ADMIN — DEXTROSE MONOHYDRATE AND SODIUM CHLORIDE 25 ML/HR: 5; .225 INJECTION, SOLUTION INTRAVENOUS at 07:17

## 2021-03-02 RX ADMIN — FENTANYL CITRATE 25 MCG: 50 INJECTION, SOLUTION INTRAMUSCULAR; INTRAVENOUS at 08:10

## 2021-03-02 NOTE — H&P
H&P    Patient: Regine Posada               Sex: female          DOA: 3/2/2021       YOB: 1945      Age:  66 y.o.        LOS:  LOS: 0 days              ASSESSMENT:   1. R ureteral stricture managed with JJ stent   2. ESRD on HD, still making adequate volume of urine     On Abx for + culture  No interval change  Feels well  Proceed to stent change       Denys Hyman MD  [phone] Office  [phone]  Pager    CC: Right ureteral stricture     HISTORY OF PRESENT ILLNESS:  Regine Posada is a 66 y.o. female with ESRD on HD recently started who has a known mid ureteral stricture managed with chronic stent. Previous issues with stent migration as reported by outside urologist managing her in [place] and therefore ended up with neph tube. Evaluation with our IR, had antegrade stent placement with 8F stent and no issues since then. Tolerating well. 1 UTI in last period of since stetn change. No hematuria. Still makes urine. No flowsheet data found. Past Medical History:   Diagnosis Date    Acidosis     Anemia     Arteriovenous fistula (HCC)     Chronic kidney disease     on HD at [facility] on MWF.      CKD (chronic kidney disease)     Diabetes (HCC)     no meds now    HLD (hyperlipidemia)     HTN (hypertension)     Hyperparathyroidism due to renal insufficiency (HCC)     Hypothyroid     Kidney stone     Lung mass     Recurrent UTI     Ureter, stricture     Uric acid nephrolithiasis     Urinary incontinence        Past Surgical History:   Procedure Laterality Date    HX APPENDECTOMY      HX CHOLECYSTECTOMY      HX GASTRIC BYPASS      HX KNEE ARTHROSCOPY      HX UROLOGICAL      right PCN placement    HX UROLOGICAL  07/23/2018    RIGHT URETEROSCOPY WITH HOLMIUM LASER    IR EXCHANGE NEPHRO PERC LT SI  2/21/2020    IR EXCHANGE NEPHRO PERC RT SI  4/13/2020    IR EXCHANGE NEPHRO PERC RT SI  7/17/2020    IR NEPHROSTOMY PERC RT PLC CATH  SI  10/14/2020    IR NEPHROURETERAL PERC RT PLC CATH NEW ACCESS  SI  4/30/2020    NH INTRO CATH DIALYSIS CIRCUIT DX ANGRPH FLUOR S&I Left 9/24/2020    FISTULOGRAM LEFT/poss permanent catheter placement performed by Theodora Adan MD at [facility] CATH LAB    VASCULAR SURGERY PROCEDURE UNLIST      lef AVF       Social History     Tobacco Use    Smoking status: Never Smoker    Smokeless tobacco: Never Used   Substance Use Topics    Alcohol use: Never     Frequency: Never    Drug use: Never       Allergies   Allergen Reactions    Ciprofloxacin Hives    Statins-Hmg-Coa Reductase Inhibitors Other (comments)     Body ache       Family History   Problem Relation Age of Onset    Heart Surgery Sister        Current Facility-Administered Medications   Medication Dose Route Frequency Provider Last Rate Last Admin    lidocaine (PF) (XYLOCAINE) 10 mg/mL (1 %) injection 0.1 mL  0.1 mL SubCUTAneous PRN Reyne Riser, CRNA        lactated Ringers infusion  75 mL/hr IntraVENous CONTINUOUS Reyne Riser, CRNA        sodium chloride (NS) flush 5-40 mL  5-40 mL IntraVENous Q8H Reyne Riser, CRNA        sodium chloride (NS) flush 5-40 mL  5-40 mL IntraVENous PRN Reyne Riser, CRNA        famotidine (PEPCID) tablet 20 mg  20 mg Oral Awanda Client, CRNA        insulin lispro (HUMALOG) injection   SubCUTAneous Awanda Client, CRNA        cefTRIAXone (ROCEPHIN) 2 g in sterile water (preservative free) 20 mL IV syringe  2 g IntraVENous ONCE Chary Srivastava MD        dextrose 5 % - 0.2% NaCl infusion  25 mL/hr IntraVENous CONTINUOUS Cristina Briceno MD 25 mL/hr at 03/02/21 0717 25 mL/hr at 03/02/21 0717       Review of Systems  Constitutional: No fever, chills, or weight loss  Respiratory: No dyspnea  Cardiovascular: No chest pain  Gastrointestinal: No vomiting or abdominal pain. Genitourinary: Denies frequency, urgency, dysuria, hematuria. Neurological: No focal motor changes.      PHYSICAL EXAMINATION:   Visit Vitals  /63 (BP 1 Location: Right arm, BP Patient Position: At rest)   Pulse 84   Temp 97.8 °F (36.6 °C)   Resp 20   Ht 5' 2\" (1.575 m)   Wt 213 lb 12.8 oz (97 kg)   SpO2 100%   BMI 39.10 kg/m²     Constitutional: Well developed, well nourished female. No acute distress. HEENT: Normocephalic, Atraumatic, EOM's intact   CV:  Normal radial pulse. Respiratory: No respiratory distress or difficulties breathing   Abdomen:  Nontender, nondistended.  Female:  No CVA tenderness  Skin: No evidence of jaundice. Normal color  Neuro/Psych:  Alert and oriented. Affect appropriate. Lymphatic:   No enlarged inguinal lymph nodes. REVIEW OF LABS AND IMAGING:      Labs: Results:   Chemistry No results for input(s): GLU, NA, K, CL, CO2, BUN, CREA, CA, AGAP, BUCR, TBIL, AP, TP, ALB, GLOB, AGRAT in the last 72 hours. No lab exists for component: GPT   CBC w/Diff No results for input(s): WBC, RBC, HGB, HCT, PLT, GRANS, LYMPH, EOS, HGBEXT, HCTEXT, PLTEXT in the last 72 hours. Cultures No results for input(s): CULT in the last 72 hours.   All Micro Results     None            Urinalysis Color   Date Value Ref Range Status   08/19/2020 DARK YELLOW   Final     Appearance   Date Value Ref Range Status   08/19/2020 TURBID   Final     Specific gravity   Date Value Ref Range Status   08/19/2020 1.021 1.005 - 1.030   Final     pH (UA)   Date Value Ref Range Status   08/19/2020 6.5 5.0 - 8.0   Final     Protein   Date Value Ref Range Status   08/19/2020 300 (A) NEG mg/dL Final     Ketone   Date Value Ref Range Status   08/19/2020 Negative NEG mg/dL Final     Bilirubin   Date Value Ref Range Status   08/19/2020 Negative NEG   Final     Blood   Date Value Ref Range Status   08/19/2020 MODERATE (A) NEG   Final     Urobilinogen   Date Value Ref Range Status   08/19/2020 1.0 0.2 - 1.0 EU/dL Final     Nitrites   Date Value Ref Range Status   08/19/2020 Negative NEG   Final     Leukocyte Esterase   Date Value Ref Range Status   08/19/2020 LARGE (A) NEG   Final     Potassium   Date Value Ref Range Status   02/16/2021 4.9 3.5 - 5.5 mmol/L Final     Creatinine   Date Value Ref Range Status   02/16/2021 5.79 (H) 0.6 - 1.3 MG/DL Final     BUN   Date Value Ref Range Status   02/16/2021 29 (H) 7.0 - 18 MG/DL Final      PSA No results for input(s): PSA in the last 72 hours. Coagulation Lab Results   Component Value Date/Time    Prothrombin time 14.2 10/14/2020 11:56 AM    Prothrombin time 14.9 07/16/2020 04:52 PM    INR 1.1 10/14/2020 11:56 AM    INR 1.2 07/16/2020 04:52 PM    aPTT 35.4 10/14/2020 11:56 AM    aPTT 46.9 (H) 07/16/2020 04:52 PM           US Results (most recent):  No results found for this or any previous visit. chest    CT Results (most recent):   Results from Hospital Encounter encounter on 01/07/21   CT ABD PELV WO CONT    Narrative EXAM: CT ABD PELV WO CONT    CLINICAL INDICATION/HISTORY: 66 years Female. Right-sided flank pain. History of  stones. Urinary tract infection. Via a stent position. ADDITIONAL HISTORY: None      TECHNIQUE: CT of the abdomen and pelvis without IV contrast. Sagittal and  coronal reformats were created. All CT scans at this facility are performed using dose optimization technique as  appropriate to a performed exam, to include automated exposure control,  adjustment of the mA and/or kV according to patient size (including appropriate  matching for site specific examination) or use of iterative reconstruction  technique. IV CONTRAST: None    COMPARISON: None    FINDINGS:   Limitations: There is limited evaluation of solid organs and vasculature due to  lack of intravenous contrast.     Lower Chest: Coronary artery calcifications. Normal heart size. Small  fat-containing Bochdalek type posterior diaphragmatic hernias. Elevation of the  right hemidiaphragm. Mesentery/Peritoneum: Unremarkable    Liver: Unremarkable    Gallbladder/biliary: Status post cholecystectomy.  No appreciable biliary ductal  dilatation. Pancreas: Unremarkable      Spleen: Unremarkable    Adrenal Glands: Unremarkable    Kidneys: Multiple water attenuating cystic lesions the bilateral kidneys, not  closely evaluated without contrast. No suspicious features are appreciated. Small peripheral calcification noted at the cysts in the interpolar region of  the left kidney. Right-sided double-J nephroureteral stent extending from the right renal pelvis  and the urinary bladder. Moderate right renal pelvis dilatation as well as  moderate distention of the proximal and central thirds of the right ureter. The  distal aspect of the right ureter appears relatively decompressed. There is a  suggestion of urothelial thickening within the right renal pelvis and proximal  central third of the right ureter. No left-sided hydronephrosis. No renal  calculi. Urinary Bladder: Nephroureteral stent extends into the urinary bladder. Otherwise unremarkable. Other Pelvic Organs: No suspicious adnexal mass appreciated. Bowel: Scattered colonic diverticula are no evidence of acute diverticulitis. Moderate degree of stool throughout the colon. No abnormally dilated or  thickened bowel loops are appreciated. The appendix is not confidently  visualized; however there are no secondary findings to suggest acute  appendicitis. Small hiatal hernia. Prior gastric bypass. Lymph Nodes: No lymphadenopathy by size criteria. Vessels: Atherosclerosis in the abdominal aorta and its branches. No abdominal  aortic aneurysm. Calcifications at the bilateral renal ostia. Body wall: Moderate anasarca. Musculoskeletal: No acute fracture. No aggressive osseous lesion appreciated. Bilateral greater trochanteric enthesopathy. Bilateral gluteus medius/minimus  muscle fatty atrophy. Rotatory scoliosis. Symphysis pubis chondrocalcinosis. Mild to moderate bilateral hip arthrosis. There is multilevel degenerative  changes of the spine.  Grade 1 anterolisthesis of L5 on S1. Impression IMPRESSION:      1. Right-sided double-J nephroureteral stent extending from the right renal  pelvis the urinary bladder.   -Moderate right renal pelviectasis and mild to moderate dilatation of the  proximal middle thirds of the right ureter in spite of the presence of the  stent. This could reflect stent malfunction or sequela of chronic distention.  -Urothelial thickening within the right renal pelvis and distended ureter could  reflect infectious or inflammatory process. Recommend correlation with  urinalysis and clinical inflammatory markers. 2. Multiple bilateral renal cortical cystic lesions, not closely assessed  without contrast. Renal ultrasound could be considered to more accurately  characterize. 3. Cholecystectomy. Diverticulosis. Advanced degenerative changes of the spine. Scoliosis. Gastric bypass. Small hiatal hernia. Additional incidental findings  as above. ABD      MRI Results (most recent): No procedure found. No diagnosis found.

## 2021-03-02 NOTE — DISCHARGE INSTRUCTIONS
Discharge Instructions          ADDITIONAL INFORMATION:   You have indwelling ureteral stents which frequently causes slight discomfort in the flank region and bladder spasms. If you are bothered by these symptoms, please contact our office and we can presribe you flomax as needed for flank discomfort or Ditropan for bladder spasms. The indwelling stent will need to be removed/exchanged as directed below. If the stent is left in place without appropriate follow-up, it may become encrusted with stone and/or infected. If that occurs, you will require multiple additional surgeries to fix this. It is very important you follow up for appropriate removal or exchange of ureteral stents. If you experience any fevers > 100.4, significant nausea/vomiting, or significant worsening of pain, please contact us at the number below. It is common to experience mild, recurrent blood in your urine and this is likely due to stent irritation. If the bleeding continues to worsen with passage of clots, you are unable to urinate, or you experience significant light-headedness, please contact us at the number below. FOLLOW UP CARE:  Next stent change will be in 6 months. DISCHARGE SUMMARY from Nurse  PATIENT INSTRUCTIONS:  After general anesthesia or intravenous sedation, for 24 hours or while taking prescription Narcotics:  · Limit your activities  · Do not drive and operate hazardous machinery  · Do not make important personal or business decisions  · Do  not drink alcoholic beverages  · If you have not urinated within 8 hours after discharge, please contact your surgeon on call.     Report the following to your surgeon:  · Excessive pain, swelling, redness or odor of or around the surgical area  · Temperature over 100.5  · Nausea and vomiting lasting longer than 4 hours or if unable to take medications  · Any signs of decreased circulation or nerve impairment to extremity: change in color, persistent numbness, tingling, coldness or increase pain  · Any questions    What to do at Home:  Recommended activity: Activity as tolerated and no driving for today. *  Please give a list of your current medications to your Primary Care Provider. *  Please update this list whenever your medications are discontinued, doses are      changed, or new medications (including over-the-counter products) are added. *  Please carry medication information at all times in case of emergency situations. These are general instructions for a healthy lifestyle:    No smoking/ No tobacco products/ Avoid exposure to second hand smoke  Surgeon General's Warning:  Quitting smoking now greatly reduces serious risk to your health. Obesity, smoking, and sedentary lifestyle greatly increases your risk for illness    A healthy diet, regular physical exercise & weight monitoring are important for maintaining a healthy lifestyle    You may be retaining fluid if you have a history of heart failure or if you experience any of the following symptoms:  Weight gain of 3 pounds or more overnight or 5 pounds in a week, increased swelling in our hands or feet or shortness of breath while lying flat in bed. Please call your doctor as soon as you notice any of these symptoms; do not wait until your next office visit. The discharge information has been reviewed with the patient. The patient verbalized understanding. Discharge medications reviewed with the patient and appropriate educational materials and side effects teaching were provided. ___________________________________________________________________________________________________________________________________  Patient Education        Cystoscopy: What to Expect at Home  Your Recovery     A cystoscopy is a procedure that lets a doctor look inside of the bladder and the urethra. The urethra is the tube that carries urine from the bladder to outside the body.  The doctor uses a thin, lighted tool called a cystoscope. Your bladder is filled with fluid. This stretches the bladder so that your doctor can look closely at the inside of your bladder. After the cystoscopy, your urethra may be sore at first, and it may burn when you urinate for the first few days after the procedure. You may feel the need to urinate more often, and your urine may be pink. These symptoms should get better in 1 or 2 days. You will probably be able to go back to most of your usual activities in 1 or 2 days. This care sheet gives you a general idea about how long it will take for you to recover. But each person recovers at a different pace. Follow the steps below to get better as quickly as possible. How can you care for yourself at home? Activity    · Rest when you feel tired. Getting enough sleep will help you recover.     · Try to walk each day. Start by walking a little more than you did the day before. Bit by bit, increase the amount you walk. Walking boosts blood flow and helps prevent pneumonia and constipation.     · Avoid strenuous activities, such as bicycle riding, jogging, weight lifting, or aerobic exercise, until your doctor says it is okay.     · Ask your doctor when you can drive again.     · Most people are able to return to work within 1 or 2 days after the procedure.     · You may shower and take baths as usual.     · Ask your doctor when it is okay for you to have sex. Diet    · You can eat your normal diet. If your stomach is upset, try bland, low-fat foods like plain rice, broiled chicken, toast, and yogurt.     · Drink plenty of fluids (unless your doctor tells you not to). Medicines    · Take pain medicines exactly as directed. ? If the doctor gave you a prescription medicine for pain, take it as prescribed.   ? If you are not taking a prescription pain medicine, ask your doctor if you can take an over-the-counter medicine.     · If you think your pain medicine is making you sick to your stomach:  ? Take your medicine after meals (unless your doctor has told you not to). ? Ask your doctor for a different pain medicine.     · If your doctor prescribed antibiotics, take them as directed. Do not stop taking them just because you feel better. You need to take the full course of antibiotics. Follow-up care is a key part of your treatment and safety. Be sure to make and go to all appointments, and call your doctor if you are having problems. It's also a good idea to know your test results and keep a list of the medicines you take. When should you call for help? Call 911 anytime you think you may need emergency care. For example, call if:    · You passed out (lost consciousness).     · You have severe trouble breathing.     · You have sudden chest pain and shortness of breath, or you cough up blood.     · You have severe belly pain. Call your doctor now or seek immediate medical care if:    · You are sick to your stomach or cannot keep fluids down.     · Your urine is still red or you see blood clots after you have urinated several times.     · You have trouble passing urine or stool, especially if you have pain or swelling in your lower belly.     · You have signs of a blood clot, such as:  ? Pain in your calf, back of the knee, thigh, or groin. ? Redness and swelling in your leg or groin.     · You develop a fever or severe chills.     · You have pain in your back just below your rib cage. This is called flank pain. Watch closely for changes in your health, and be sure to contact your doctor if:    · You have pain or burning when you urinate. A burning feeling is normal for a day or two after the test, but call if it does not get better.     · You have a frequent urge to urinate but can pass only small amounts of urine.     · Your urine is pink, red, or cloudy, or smells bad. It is normal for the urine to have a pinkish color for a few days after the test, but call if it does not get better. Where can you learn more? Go to http://www.gray.com/  Enter C842 in the search box to learn more about \"Cystoscopy: What to Expect at Home. \"  Current as of: June 29, 2020               Content Version: 12.6  © 6588-0022 [employer]. Care instructions adapted under license by [employer] (which disclaims liability or warranty for this information). If you have questions about a medical condition or this instruction, always ask your healthcare professional. [address] any warranty or liability for your use of this information.

## 2021-03-02 NOTE — OP NOTES
Operative Note  Patient: Ranjith Quigley               Sex: female             MRN: [id number]  YOB: 1945      Age:  66 y.o. Preoperative Diagnosis: N39.9 URETERAL STRICTURE  Postoperative Diagnosis:  N39.9 URETERAL STRICTURE  Surgeon: Niki Daley     Indication: This is a 65 y/o woman with ESRD on HD, still oliguric,  with right mid ureteral stricture managed with indwelling stent here today for ureteral stent exchanged. Procedure:    1) Cystoscopy  2) Retrograde pyelogram with interpretation  3) Ureteral stent exchange     Findings:    1). Bladder was erythematous, urine cloudy, sent for culture   2). Retrograde pyelogram revealed hydronephrosis   3). 8x24 JJ stent exchanged without complication     Narrative of Events: The patient was brought to the operative suite. MAC was induced and preoperative antibiotics were administered. They were then placed in the dorsal lithotomy position and their external genitalia was prepped and draped in the usual fashion. A surgical timeout was performed confirming the patient's name, date of birth, laterality, and antibiotics. All were in agreement. A 22 [place] cystourethroscope was then inserted into the patients bladder. The urethra revealed no abnormalitiesThorough cystoscopy was performed with both the 30 degree and 70 degree lens which revealed cloudy urine and some erythema. Urine sent for culture. The affected side was identified and ureteral orifice was cannulated with a  0.035 sensor tip wire up to renal pelvis. Stent removed. A ureteral catheter was advanced over the wire and retrograde pyelogram was performed confirming appropriate location and some hydro. A ureteral stent was then advanced over the wire and deployed in the standard fashion with an excellent curl in the bladder and renal pelvis. The bladder was drained and the patient was then awoken and transferred to the recovery room in stable condition. Estimated Blood Loss: <5CC     Anesthesia:  MAC                  Implants:   Implant Name Type Inv. Item Serial No.  Lot No. LRB No. Used Action   STENT URET 0.038 IN 8 FRX24 CM SET 2 DUROMETER POLARIS ULTRA - [id number]  STENT URET 0.038 IN 8 FRX24 CM SET 2 DUROMETER POLARIS ULTRA  [employer] [id number] Right 1 Implanted       Specimens:   ID Type Source Tests Collected by Time Destination   1 : RIGHT KIDNEY URINE FOR CULTURE Urine Cystoscopic Urine CULTURE, URINE Andreina Mittal MD 3/2/2021  8:15 AM Microbiology        Drains: 8R89 JJ stent            Complications:  None           Plan:  1.  Stent change in 6 months  2. 2 week preop visit with Mike Frank and urine culture     Madison Matson MD  3/2/2021

## 2021-03-02 NOTE — ANESTHESIA PREPROCEDURE EVALUATION
Relevant Problems   No relevant active problems       Anesthetic History     PONV          Review of Systems / Medical History  Patient summary reviewed and pertinent labs reviewed    Pulmonary  Within defined limits                 Neuro/Psych   Within defined limits           Cardiovascular    Hypertension: well controlled              Exercise tolerance: >4 METS     GI/Hepatic/Renal     GERD: well controlled    Renal disease: ESRD and dialysis       Endo/Other    Diabetes: well controlled, type 2  Hypothyroidism       Other Findings              Physical Exam    Airway  Mallampati: III  TM Distance: 4 - 6 cm  Neck ROM: decreased range of motion   Mouth opening: Diminished (comment)     Cardiovascular    Rhythm: regular  Rate: normal         Dental    Dentition: Upper partial plate     Pulmonary  Breath sounds clear to auscultation               Abdominal  GI exam deferred       Other Findings            Anesthetic Plan    ASA: 3  Anesthesia type: MAC and general - backup          Induction: Intravenous  Anesthetic plan and risks discussed with: Patient

## 2021-03-03 LAB
BACTERIA SPEC CULT: NORMAL
SERVICE CMNT-IMP: NORMAL

## 2021-03-03 NOTE — ANESTHESIA POSTPROCEDURE EVALUATION
Procedure(s):  CYSTOSCOPY/RIGHT DOUBLE J STENT/C-ARM change. MAC, general - backup    Anesthesia Post Evaluation      Multimodal analgesia: multimodal analgesia used between 6 hours prior to anesthesia start to PACU discharge  Patient location during evaluation: PACU  Patient participation: complete - patient participated  Level of consciousness: awake and alert  Airway patency: patent  Anesthetic complications: no  Cardiovascular status: acceptable  Respiratory status: acceptable  Hydration status: acceptable  Post anesthesia nausea and vomiting:  none  Final Post Anesthesia Temperature Assessment:  Normothermia (36.0-37.5 degrees C)      INITIAL Post-op Vital signs:   Vitals Value Taken Time   BP 97/51 03/02/21 0905   Temp 36.5 °C (97.7 °F) 03/02/21 0913   Pulse 84 03/02/21 0908   Resp 13 03/02/21 0908   SpO2 98 % 03/02/21 0908   Vitals shown include unvalidated device data.

## 2021-03-09 ENCOUNTER — TELEPHONE (OUTPATIENT)
Dept: CARDIOLOGY CLINIC | Age: 76
End: 2021-03-09

## 2021-03-22 ENCOUNTER — HOSPITAL ENCOUNTER (OUTPATIENT)
Dept: MRI IMAGING | Age: 76
Discharge: HOME | End: 2021-03-22
Attending: PHYSICAL MEDICINE & REHABILITATION
Payer: MEDICARE

## 2021-03-22 DIAGNOSIS — M48.062 SPINAL STENOSIS, LUMBAR REGION, WITH NEUROGENIC CLAUDICATION: ICD-10-CM

## 2021-03-22 PROCEDURE — 72148 MRI LUMBAR SPINE W/O DYE: CPT

## 2021-04-29 ENCOUNTER — HOSPITAL ENCOUNTER (OUTPATIENT)
Dept: PHYSICAL THERAPY | Age: 76
Discharge: HOME | End: 2021-04-29
Payer: MEDICARE

## 2021-04-29 PROCEDURE — 97110 THERAPEUTIC EXERCISES: CPT

## 2021-04-29 PROCEDURE — 97162 PT EVAL MOD COMPLEX 30 MIN: CPT

## 2021-04-29 NOTE — PROGRESS NOTES
PT DAILY TREATMENT NOTE - [facility]     Patient Name: Rock Damon  Date:2021  : 1945  [x]  Patient  Verified  Payor: BLUE CROSS MEDICARE / Plan: [facility] HMO / Product Type: Managed Care Medicare /    In time:1115  Out time:1200  Total Treatment Time (min): 45  Total Timed Codes (min): 25  1:1 Treatment Time ( [address] only): 45   Visit #: 1 of 8    Treatment Area: Low back pain [M54.5]    SUBJECTIVE  Pain Level (0-10 scale): 6  Any medication changes, allergies to medications, adverse drug reactions, diagnosis change, or new procedure performed?: [x] No    [] Yes (see summary sheet for update)  Subjective functional status:   [x] See Eval form in paper chart      OBJECTIVE    20 min [x]Eval                  []Re-Eval       25 min Therapeutic Activity:  [x]  See flow sheet :sit to stands, transfer training. Education on safe posturing and sleeping positioning, gait and balance assessment, bed mobility   Rationale: increase ROM, increase strength, improve coordination, improve balance and increase proprioception  to improve the patients ability to perform independent mobility. With   [] TE   [] TA   [] neuro   [] other: Patient Education: [x] Review HEP    [] Progressed/Changed HEP based on:   [] positioning   [] body mechanics   [] transfers   [] heat/ice application    [] other:                  Pain Level (0-10 scale) post treatment: 6    ASSESSMENT:   [x]  See Evaluation         Goals:  Short Term Goals: To be accomplished in 1 weeks:  1. Therapist to establish HEP for strength/gait & balance training to decrease fall risk. Long Term Goals: To be accomplished in 4 weeks:  1. Patient will be independent with HEP to improve carryover of functional gains with ADLs between visits. Eval Status:n/a  2. Pt will increase score on Tinetti Balance & Gait Assessment to > 19/ to demonstrate decreased fall risk. Eval Status:  3.  Pt will increase FOTO score to 48 points to demonstrate increased ease with ADLs. Eval Status: FOTO: 21  4. Patient will decrease time on TUG to <12 seconds to demonstrate decreasef fall risk. Eval Status: 20\" with SPC, 27\" with RW  5 Pt will increase B hip flexion/abduction to 5/5 with MMT to improve ease with gait & safety with ambulation.    Eval Status:   right hip flexion: 3/5  right hip abduction: 2/5  left hip flexion: 3/5   Left hip abduction: 3/5    PLAN      [x]  Continue plan of care    []  Other:_      Melanie Garcia, PT 4/29/2021  12:41 PM

## 2021-04-29 NOTE — PROGRESS NOTES
[facility]  [address], [address] [phone] fax    Plan of Care/ Statement of Necessity for Physical Therapy Services    Patient name: Fadia Colin Start of Care: 2021   Referral source: Nirali Worthy MD : 1945    Medical Diagnosis: Low back pain [M54.5]  Payor: BLUE CROSS MEDICARE / Plan: [facility] HMO / Product Type: Managed Care Medicare /    Onset Date:6 months - 1 1 year ago    Treatment Diagnosis: LBP   Prior Hospitalization: see medical history Provider#: [id number]   Medications: Verified on Patient summary List    Comorbidities: renal disease with dialysis MWF, OA, lumbar stenosis , DM, neuropathy, carpal tunnel, depression, [place] left ear   Prior Level of Function: retired. Deconditioning worsening over the past year. Uses RW or SPC for mobility. Lives with son in 2 story home. The Plan of Care and following information is based on the information from the initial evaluation. Assessment/ key information: Patient is a 66 y. o.female presenting with Low back pain [M54.5]. Ms. Nohemi Rivera presents to initial PT evaluation with progressively worsening low back pain, LE weakness, altered gait and balance impairment over the past 6 months-1 year. Imaging has shown lumbar stenosis, multiple disc bulges, as well as anterolisthesis of L5/S1. She displays noted hip and core weakness, with lumbar flexion bias for reduction of pain and leg symptoms. She is at high risk for falls per balance and gait assessments. Patient is hoping to avoid spinal surgery and to regain her functional independence. Patient will benefit from skilled PT services to address deficits and facilitate return to premorbid activity level and promote improved quality of life.        Evaluation Complexity History HIGH Complexity :3+ comorbidities / personal factors will impact the outcome/ POC ; Examination MEDIUM Complexity : 3 Standardized tests and measures addressing body structure, function, activity limitation and / or participation in recreation  ;Presentation MEDIUM Complexity : Evolving with changing characteristics  ; Clinical Decision Making HIGH Complexity : FOTO score of 1- 25   Overall Complexity Rating: MEDIUM  Problem List: pain affecting function, decrease ROM, decrease strength, edema affecting function, impaired gait/ balance, decrease ADL/ functional abilitiies, decrease activity tolerance, decrease flexibility/ joint mobility and decrease transfer abilities   Treatment Plan may include any combination of the following: Therapeutic exercise, Therapeutic activities, Neuromuscular re-education, Physical agent/modality, Gait/balance training, Manual therapy, Aquatic therapy, Patient education, Self Care training, Functional mobility training, Home safety training and Stair training  Patient / Family readiness to learn indicated by: asking questions, trying to perform skills and interest  Persons(s) to be included in education: patient (P)  Barriers to Learning/Limitations: None  Patient Goal (s): improve stairs, get legs stronger, regain independence.   Patient Self Reported Health Status: fair  Rehabilitation Potential: fair  Short Term Goals: To be accomplished in 1 weeks:  1. Therapist to establish HEP for strength/gait & balance training to decrease fall risk. Long Term Goals: To be accomplished in 4 weeks:  1. Patient will be independent with HEP to improve carryover of functional gains with ADLs between visits. Eval Status:n/a  2. Pt will increase score on Tinetti Balance & Gait Assessment to > 19/28 to demonstrate decreased fall risk. Eval Status:13/28  3. Pt will increase FOTO score to 48 points to demonstrate increased ease with ADLs. Eval Status: FOTO: 21  4. Patient will decrease time on TUG to <12 seconds to demonstrate decreasef fall risk.    Eval Status: 20\" with SPC, 27\" with RW  5 Pt will increase B hip flexion/abduction to 5/5 with MMT to improve ease with gait & safety with ambulation. Eval Status:   right hip flexion: 3/5  right hip abduction: 2/5  left hip flexion: 3/5   Left hip abduction: 3/5    Frequency / Duration: Patient to be seen 2 times per week for 4 weeks. Patient/ Caregiver education and instruction: Diagnosis, prognosis, self care, activity modification and exercises   [x]  Plan of care has been reviewed with PTA      Certification Period: 4/29/21 - 5/28/21    Artis Almeida, PT 4/29/2021 12:28 PM    ________________________________________________________________________    I certify that the above Therapy Services are being furnished while the patient is under my care. I agree with the treatment plan and certify that this therapy is necessary.     Physician's Signature:____________Date:_________TIME:________     Jacki Reynolds MD  ** Signature, Date and Time must be completed for valid certification **    Please sign and return to [facility] [address]  [address] [place], [address] [phone] fax

## 2021-05-13 ENCOUNTER — HOSPITAL ENCOUNTER (OUTPATIENT)
Dept: PHYSICAL THERAPY | Age: 76
Discharge: HOME | End: 2021-05-13
Payer: MEDICARE

## 2021-05-13 PROCEDURE — 97110 THERAPEUTIC EXERCISES: CPT

## 2021-05-13 PROCEDURE — 97112 NEUROMUSCULAR REEDUCATION: CPT

## 2021-05-13 PROCEDURE — 97530 THERAPEUTIC ACTIVITIES: CPT

## 2021-05-20 ENCOUNTER — HOSPITAL ENCOUNTER (OUTPATIENT)
Dept: PHYSICAL THERAPY | Age: 76
Discharge: HOME | End: 2021-05-20
Payer: MEDICARE

## 2021-05-20 PROCEDURE — 97112 NEUROMUSCULAR REEDUCATION: CPT

## 2021-05-20 PROCEDURE — 97110 THERAPEUTIC EXERCISES: CPT

## 2021-05-20 NOTE — PROGRESS NOTES
PT DAILY TREATMENT NOTE     Patient Name: Stewart Jones  Date:2021  : 1945  [x]  Patient  Verified  Payor: BLUE CROSS MEDICARE / Plan: [facility]  HMO / Product Type: Managed Care Medicare /    In time:730  Out time:811  Total Treatment Time (min): 41  Visit #: 3 of 8    Medicare/BCBS Only   Total Timed Codes (min):  41 1:1 Treatment Time:  41       Treatment Area: Low back pain [M54.5]    SUBJECTIVE  Pain Level (0-10 scale): 3  Any medication changes, allergies to medications, adverse drug reactions, diagnosis change, or new procedure performed?: [x] No    [] Yes (see summary sheet for update)  Subjective functional status/changes:   [] No changes reported  Pt reports she has tried the LTR exercises in her bed at home and it made her sore, she will not be doing them until after she \"negotiates getting a shot. \" Pt also feels like the did a lot at dialysis yesterday and she has muscle spasms from her stomach to her legs. OBJECTIVE    29 min Therapeutic Exercise:  [x] See flow sheet :   Rationale: increase ROM and increase strength to improve the patients ability to perform ADLs    12 min Neuromuscular Re-education:  [x]  See flow sheet : balance, core stabilization    Rationale: increase strength, improve coordination, improve balance and increase proprioception  to improve the patients ability to tolerate sustained postures and decrease risk of falls.            With   [] TE   [] TA   [] neuro   [] other: Patient Education: [x] Review HEP    [] Progressed/Changed HEP based on:   [] positioning   [] body mechanics   [] transfers   [] heat/ice application    [] other:      Other Objective/Functional Measures: adjusted program per flow sheet for pt symptoms     Pain Level (0-10 scale) post treatment: 2    ASSESSMENT/Changes in Function: After warm upon the stepper, pt reporting fatigue and stated \"today's a bad day;\" deferred weights with standing functional hip strength and sit to stands. Pt also not tolerating supine exercises well today, deferred for standing core stabilization. Pt requiring CGA for all balance and for TB core stabilization exercises     Patient will continue to benefit from skilled PT services to modify and progress therapeutic interventions, address functional mobility deficits, address ROM deficits, address strength deficits, analyze and address soft tissue restrictions, analyze and cue movement patterns, analyze and modify body mechanics/ergonomics, assess and modify postural abnormalities, address imbalance/dizziness and instruct in home and community integration to attain remaining goals. []  See Plan of Care  []  See progress note/recertification  []  See Discharge Summary         Progress towards goals / Updated goals:  Short Term Goals: To be accomplished in 1 weeks:  1. Therapist to establish HEP for strength/gait & balance training to decrease fall risk. MET      Long Term Goals: To be accomplished in 4 weeks:  1. Patient will be independent with HEP to improve carryover of functional gains with ADLs between visits.             Eval Status:n/   MET  2. Pt will increase score on Tinetti Balance & Gait Assessment to > 19/28 to demonstrate decreased fall risk.              Eval Status:13/28   Progressing with balance exercises; reassess at of POC  3. Pt will increase FOTO score to 48 points to demonstrate increased ease with ADLs.                Eval Status: FOTO: 21   To be reassessed at end of POC  4. Patient will decrease time on TUG to <12 seconds to demonstrate decreasef fall risk.              Eval Status: 20\" with SPC, 27\" with RW   To be reassessed at end of POC  5 Pt will increase B hip flexion/abduction to 5/5 with MMT to improve ease with gait & safety with ambulation.    Eval Status:   right hip flexion: 3/5  right hip abduction: 2/5  left hip flexion: 3/5              Left hip abduction: 3/5   Tolerating progression of exercises    PLAN  [x]  Upgrade activities as tolerated     [x]  Continue plan of care  []  Update interventions per flow sheet       []  Discharge due to:_  []  Other:_      Aleksandra Montemayor, PT 5/20/2021  7:35 AM    No future appointments.

## 2021-06-01 ENCOUNTER — APPOINTMENT (OUTPATIENT)
Dept: PHYSICAL THERAPY | Age: 76
End: 2021-06-01

## 2021-06-08 ENCOUNTER — APPOINTMENT (OUTPATIENT)
Dept: PHYSICAL THERAPY | Age: 76
End: 2021-06-08

## 2021-06-18 ENCOUNTER — APPOINTMENT (OUTPATIENT)
Dept: GENERAL RADIOLOGY | Age: 76
End: 2021-06-18
Attending: PHYSICIAN ASSISTANT
Payer: MEDICARE

## 2021-06-18 ENCOUNTER — APPOINTMENT (OUTPATIENT)
Dept: CT IMAGING | Age: 76
End: 2021-06-18
Attending: STUDENT IN AN ORGANIZED HEALTH CARE EDUCATION/TRAINING PROGRAM
Payer: MEDICARE

## 2021-06-18 ENCOUNTER — HOSPITAL ENCOUNTER (OUTPATIENT)
Age: 76
Setting detail: OBSERVATION
Discharge: HOME HEALTH | End: 2021-06-21
Attending: EMERGENCY MEDICINE | Admitting: INTERNAL MEDICINE
Payer: MEDICARE

## 2021-06-18 DIAGNOSIS — R31.9 URINARY TRACT INFECTION WITH HEMATURIA, SITE UNSPECIFIED: ICD-10-CM

## 2021-06-18 DIAGNOSIS — N39.0 URINARY TRACT INFECTION WITH HEMATURIA, SITE UNSPECIFIED: ICD-10-CM

## 2021-06-18 DIAGNOSIS — N18.6 ESRD (END STAGE RENAL DISEASE) (HCC): ICD-10-CM

## 2021-06-18 DIAGNOSIS — A41.9 SEPSIS, DUE TO UNSPECIFIED ORGANISM, UNSPECIFIED WHETHER ACUTE ORGAN DYSFUNCTION PRESENT (HCC): Primary | ICD-10-CM

## 2021-06-18 DIAGNOSIS — I95.9 HYPOTENSION, UNSPECIFIED HYPOTENSION TYPE: ICD-10-CM

## 2021-06-18 PROBLEM — R00.0 TACHYCARDIA: Status: ACTIVE | Noted: 2021-06-18

## 2021-06-18 LAB
ALBUMIN SERPL-MCNC: 3.7 G/DL (ref 3.4–5)
ALBUMIN/GLOB SERPL: 0.8 {RATIO} (ref 0.8–1.7)
ALP SERPL-CCNC: 133 U/L (ref 45–117)
ALT SERPL-CCNC: 56 U/L (ref 13–56)
ANION GAP SERPL CALC-SCNC: 7 MMOL/L (ref 3–18)
APPEARANCE UR: ABNORMAL
AST SERPL-CCNC: 46 U/L (ref 10–38)
BACTERIA URNS QL MICRO: ABNORMAL /HPF
BASOPHILS # BLD: 0.1 K/UL (ref 0–0.1)
BASOPHILS NFR BLD: 1 % (ref 0–2)
BILIRUB SERPL-MCNC: 0.6 MG/DL (ref 0.2–1)
BILIRUB UR QL: NEGATIVE
BUN SERPL-MCNC: 19 MG/DL (ref 7–18)
BUN/CREAT SERPL: 4 (ref 12–20)
CALCIUM SERPL-MCNC: 9.2 MG/DL (ref 8.5–10.1)
CHLORIDE SERPL-SCNC: 97 MMOL/L (ref 100–111)
CO2 SERPL-SCNC: 34 MMOL/L (ref 21–32)
COLOR UR: ABNORMAL
CREAT SERPL-MCNC: 4.58 MG/DL (ref 0.6–1.3)
DIFFERENTIAL METHOD BLD: ABNORMAL
EOSINOPHIL # BLD: 0.1 K/UL (ref 0–0.4)
EOSINOPHIL NFR BLD: 2 % (ref 0–5)
EPITH CASTS URNS QL MICRO: ABNORMAL /LPF (ref 0–5)
ERYTHROCYTE [DISTWIDTH] IN BLOOD BY AUTOMATED COUNT: 17.4 % (ref 11.6–14.5)
GLOBULIN SER CALC-MCNC: 4.9 G/DL (ref 2–4)
GLUCOSE SERPL-MCNC: 117 MG/DL (ref 74–99)
GLUCOSE UR STRIP.AUTO-MCNC: NEGATIVE MG/DL
HCT VFR BLD AUTO: 38.1 % (ref 35–45)
HGB BLD-MCNC: 12.1 G/DL (ref 12–16)
HGB UR QL STRIP: ABNORMAL
KETONES UR QL STRIP.AUTO: NEGATIVE MG/DL
LACTATE BLD-SCNC: 1.37 MMOL/L (ref 0.4–2)
LACTATE BLD-SCNC: 3.47 MMOL/L (ref 0.4–2)
LEUKOCYTE ESTERASE UR QL STRIP.AUTO: ABNORMAL
LYMPHOCYTES # BLD: 2.7 K/UL (ref 0.9–3.6)
LYMPHOCYTES NFR BLD: 38 % (ref 21–52)
MCH RBC QN AUTO: 31.1 PG (ref 24–34)
MCHC RBC AUTO-ENTMCNC: 31.8 G/DL (ref 31–37)
MCV RBC AUTO: 97.9 FL (ref 74–97)
MONOCYTES # BLD: 0.7 K/UL (ref 0.05–1.2)
MONOCYTES NFR BLD: 10 % (ref 3–10)
NEUTS SEG # BLD: 3.5 K/UL (ref 1.8–8)
NEUTS SEG NFR BLD: 49 % (ref 40–73)
NITRITE UR QL STRIP.AUTO: NEGATIVE
PH UR STRIP: 8.5 [PH] (ref 5–8)
PLATELET # BLD AUTO: 224 K/UL (ref 135–420)
PMV BLD AUTO: 10.4 FL (ref 9.2–11.8)
POTASSIUM SERPL-SCNC: 4.2 MMOL/L (ref 3.5–5.5)
PROT SERPL-MCNC: 8.6 G/DL (ref 6.4–8.2)
PROT UR STRIP-MCNC: >1000 MG/DL
RBC # BLD AUTO: 3.89 M/UL (ref 4.2–5.3)
RBC #/AREA URNS HPF: ABNORMAL /HPF (ref 0–5)
SODIUM SERPL-SCNC: 138 MMOL/L (ref 136–145)
SP GR UR REFRACTOMETRY: 1.02 (ref 1–1.03)
UROBILINOGEN UR QL STRIP.AUTO: 0.2 EU/DL (ref 0.2–1)
WBC # BLD AUTO: 7.1 K/UL (ref 4.6–13.2)
WBC URNS QL MICRO: ABNORMAL /HPF (ref 0–4)

## 2021-06-18 PROCEDURE — 85025 COMPLETE CBC W/AUTO DIFF WBC: CPT

## 2021-06-18 PROCEDURE — 87077 CULTURE AEROBIC IDENTIFY: CPT

## 2021-06-18 PROCEDURE — 81001 URINALYSIS AUTO W/SCOPE: CPT

## 2021-06-18 PROCEDURE — 96376 TX/PRO/DX INJ SAME DRUG ADON: CPT

## 2021-06-18 PROCEDURE — 96375 TX/PRO/DX INJ NEW DRUG ADDON: CPT

## 2021-06-18 PROCEDURE — 87040 BLOOD CULTURE FOR BACTERIA: CPT

## 2021-06-18 PROCEDURE — 99284 EMERGENCY DEPT VISIT MOD MDM: CPT

## 2021-06-18 PROCEDURE — 74011000250 HC RX REV CODE- 250: Performed by: STUDENT IN AN ORGANIZED HEALTH CARE EDUCATION/TRAINING PROGRAM

## 2021-06-18 PROCEDURE — 74176 CT ABD & PELVIS W/O CONTRAST: CPT

## 2021-06-18 PROCEDURE — 99218 HC RM OBSERVATION: CPT

## 2021-06-18 PROCEDURE — 99219 PR INITIAL OBSERVATION CARE/DAY 50 MINUTES: CPT | Performed by: INTERNAL MEDICINE

## 2021-06-18 PROCEDURE — 96374 THER/PROPH/DIAG INJ IV PUSH: CPT

## 2021-06-18 PROCEDURE — 71045 X-RAY EXAM CHEST 1 VIEW: CPT

## 2021-06-18 PROCEDURE — 74011250637 HC RX REV CODE- 250/637: Performed by: STUDENT IN AN ORGANIZED HEALTH CARE EDUCATION/TRAINING PROGRAM

## 2021-06-18 PROCEDURE — 83605 ASSAY OF LACTIC ACID: CPT

## 2021-06-18 PROCEDURE — 74011250636 HC RX REV CODE- 250/636: Performed by: STUDENT IN AN ORGANIZED HEALTH CARE EDUCATION/TRAINING PROGRAM

## 2021-06-18 PROCEDURE — 93005 ELECTROCARDIOGRAM TRACING: CPT

## 2021-06-18 PROCEDURE — 80053 COMPREHEN METABOLIC PANEL: CPT

## 2021-06-18 PROCEDURE — 74011250636 HC RX REV CODE- 250/636: Performed by: PHYSICIAN ASSISTANT

## 2021-06-18 PROCEDURE — 65270000029 HC RM PRIVATE

## 2021-06-18 PROCEDURE — 87086 URINE CULTURE/COLONY COUNT: CPT

## 2021-06-18 RX ORDER — SODIUM CHLORIDE 0.9 % (FLUSH) 0.9 %
5-10 SYRINGE (ML) INJECTION AS NEEDED
Status: DISCONTINUED | OUTPATIENT
Start: 2021-06-18 | End: 2021-06-21 | Stop reason: HOSPADM

## 2021-06-18 RX ORDER — ACETAMINOPHEN 500 MG
1000 TABLET ORAL
Status: COMPLETED | OUTPATIENT
Start: 2021-06-18 | End: 2021-06-18

## 2021-06-18 RX ADMIN — CEFEPIME HYDROCHLORIDE 2 G: 2 INJECTION, POWDER, FOR SOLUTION INTRAVENOUS at 18:45

## 2021-06-18 RX ADMIN — SODIUM CHLORIDE 1000 ML: 900 INJECTION, SOLUTION INTRAVENOUS at 18:29

## 2021-06-18 RX ADMIN — SODIUM CHLORIDE 1000 ML: 900 INJECTION, SOLUTION INTRAVENOUS at 22:15

## 2021-06-18 RX ADMIN — ACETAMINOPHEN 1000 MG: 500 TABLET ORAL at 22:15

## 2021-06-18 NOTE — PROGRESS NOTES
[facility]  [address]   [facility], [address] [phone] fax    Discharge Summary  Patient name: Perry Velasco Start of Care: 2021   Referral source: Zachery Herron MD : 1945               Medical Diagnosis: Low back pain [M54.5]  Payor: BLUE CROSS MEDICARE / Plan: [facility] HMO / Product Type: Managed Care Medicare /     Onset Date:6 months - 1 1 year ago               Treatment Diagnosis: LBP   Prior Hospitalization: see medical history Provider#: [id number]   Medications: Verified on Patient summary List    Comorbidities: renal disease with dialysis MWF, OA, lumbar stenosis , DM, neuropathy, carpal tunnel, depression, [place] left ear   Prior Level of Function: retired. Deconditioning worsening over the past year. Uses RW or SPC for mobility. Lives with son in 2 story home. Visits from Start of Care: 3    Missed Visits: 0    Reporting Period : 21 to 21    Short Term Goals: To be accomplished in 1 weeks:  1. Therapist to establish HEP for strength/gait & balance training to decrease fall risk.               MET      Long Term Goals: To be accomplished in 4 weeks:  1. Patient will be independent with HEP to improve carryover of functional gains with ADLs between visits.             Eval Status:n/              MET  2. Pt will increase score on Tinetti Balance & Gait Assessment to > 19/28 to demonstrate decreased fall risk.              Eval Status:    Not met - unable to reassess due to unplanned DC  3. Pt will increase FOTO score to 48 points to demonstrate increased ease with ADLs.                Eval Status: FOTO: 21    Not met - unable to reassess due to unplanned DC  4.  Patient will decrease time on TUG to <12 seconds to demonstrate decreasef fall risk.              Eval Status: 20\" with SPC, 27\" with RW    Not met - unable to reassess due to unplanned DC  5 Pt will increase B hip flexion/abduction to 5/5 with MMT to improve ease with gait & safety with ambulation. Eval Status:   right hip flexion: 3/5  right hip abduction: 2/5  left hip flexion: 3/5              Left hip abduction: 3/5    Not met - unable to reassess due to unplanned DC    Assessment/Summary of care: Ms. Anthony Strickland was seen 3 PT visits prior to requesting DC due to financial concerns. Should pt require treatment in the future, we would be happy to continue with an updated referral from the physician.         RECOMMENDATIONS:  [x]Discontinue therapy: []Patient has reached or is progressing toward set goals      [x]Patient is non-compliant or has abdicated      []Due to lack of appreciable progress towards set 5664  60 Bessy, PT 6/18/2021 9:21 AM

## 2021-06-18 NOTE — ED TRIAGE NOTES
Sick for 3 days. Started diarrhea/vomiting and moved to nausea/weakness with feeling light headed and dizzy. A lot of blood present when urinating pt states.

## 2021-06-18 NOTE — ED PROVIDER NOTES
EMERGENCY DEPARTMENT HISTORY AND PHYSICAL EXAM    7:09 PM      Date: 6/18/2021  Patient Name: Iris Ganser    History of Presenting Illness     Chief Complaint   Patient presents with    Nausea    Vomiting         History Provided By: Patient  Location/Duration/Severity/Modifying factors   70-year-old female with past medical history of ESRD on dialysis and midodrine, kidney stones, chronic UTIs status post stent presenting with 1 month of hematuria. The patient was placed on ciprofloxacin for approximately 1 week, but symptoms did not resolve. She has had mild dysuria, and states that she feels as if she is getting another urinary tract infection. Concerned about dark urine, and brought a sample from home. Stent replaced by urology in March for R ureteral stricture. Patient completed her dialysis today, but did have an episode of hypotension to SBP over 54. Patient took her midodrine today. PCP: Connor Montes MD    Current Facility-Administered Medications   Medication Dose Route Frequency Provider Last Rate Last Admin    sodium chloride (NS) flush 5-10 mL  5-10 mL IntraVENous PRN NOVA Ochoa        cefepime (MAXIPIME) 2 g in sterile water (preservative free) 10 mL IV syringe  2 g IntraVENous Q24H Gabriel Bennett  mL/hr at 06/18/21 1845 2 g at 06/18/21 1845     Current Outpatient Medications   Medication Sig Dispense Refill    levoFLOXacin (LEVAQUIN) 500 mg tablet Take 1 Tab by mouth daily. 7 Tab 0    fludrocortisone (FLORINEF) 0.1 mg tablet Take 1 Tab by mouth daily. 30 Tab 5    trimethoprim-sulfamethoxazole (BACTRIM DS, SEPTRA DS) 160-800 mg per tablet Take 1 Tab by mouth two (2) times a day. 14 Tab 0    ondansetron hcl (ZOFRAN) 4 mg tablet Take 1 Tab by mouth every eight (8) hours as needed for Nausea or Vomiting. 4 Tab 0    sertraline (Zoloft) 25 mg tablet Take 25 mg by mouth daily.       midodrine (PROAMATINE) 2.5 mg tablet Take 1 Tab by mouth two (2) times a day for 180 days. 180 Tab 1    b complex vitamins (Vitamins B Complex) tablet Take 1 Tab by mouth daily.  estradioL (Estrace) 0.01 % (0.1 mg/gram) vaginal cream Apply a fingertip amount around the urethra three times a week. 30 g 3    hydrALAZINE (APRESOLINE) 10 mg tablet Take 1 Tab by mouth four (4) times daily. 100 Tab 1    biotin 1,000 mcg chew Take 1 Tab by mouth daily.  cyanocobalamin 1,000 mcg tablet Take 1,000 mcg by mouth daily.  gabapentin (NEURONTIN) 100 mg capsule Take 100 mg by mouth nightly. AS needed      lactobacillus sp. 50 billion cpu (BIO-K PLUS) 50 billion cell -375 mg cap capsule Take 1 Cap by mouth daily. 30 Cap 2    tamsulosin (FLOMAX) 0.4 mg capsule Take 1 Cap by mouth daily. 90 Cap 3    sodium bicarbonate 650 mg tablet Take 2 Tabs by mouth three (3) times daily. Indications: excess body acid (Patient taking differently: Take 1,300 mg by mouth two (2) times a day. 3 tabs bid  Indications: excess body acid) 30 Tab 1    acetaminophen (TYLENOL) 325 mg tablet Take 650 mg by mouth two (2) times a day.  allopurinoL (ZYLOPRIM) 100 mg tablet Take 200 mg by mouth daily.  ascorbic acid, vitamin C, (VITAMIN C) 500 mg tablet Take 500 mg by mouth daily.  calcitRIOL (ROCALTROL) 0.25 mcg capsule Take 0.25 mcg by mouth daily.  cholecalciferol (VITAMIN D3) (2,000 UNITS /50 MCG) cap capsule Take 2,000 Units by mouth two (2) times a day. Take two tabs a total of 4000 units      latanoprost (XALATAN) 0.005 % ophthalmic solution Administer 1 Drop to both eyes nightly. One drop at bedtime      levothyroxine (SYNTHROID) 125 mcg tablet Take 125 mcg by mouth Daily (before breakfast).  omeprazole (PRILOSEC) 20 mg capsule Take 20 mg by mouth daily.  ondansetron (ZOFRAN ODT) 4 mg disintegrating tablet Take 4 mg by mouth every eight (8) hours as needed for Nausea or Vomiting.       vit B Cmplx 3-FA-Vit C-Biotin (NEPHRO HOWIE RX) 1- mg-mg-mcg tablet Take 1 Tab by mouth daily. Past History     Past Medical History:  Past Medical History:   Diagnosis Date    Acidosis     Anemia     Arteriovenous fistula ([address]     Chronic kidney disease     on HD at [address] on MWF.  CKD (chronic kidney disease)     Diabetes (HCC)     no meds now    HLD (hyperlipidemia)     HTN (hypertension)     Hyperparathyroidism due to renal insufficiency (HCC)     Hypothyroid     Kidney stone     Lung mass     Recurrent UTI     Ureter, stricture     Uric acid nephrolithiasis     Urinary incontinence        Past Surgical History:  Past Surgical History:   Procedure Laterality Date    HX APPENDECTOMY      HX CHOLECYSTECTOMY      HX GASTRIC BYPASS      HX KNEE ARTHROSCOPY      HX UROLOGICAL      right PCN placement    HX UROLOGICAL  07/23/2018    RIGHT URETEROSCOPY WITH HOLMIUM LASER    IR EXCHANGE NEPHRO PERC LT SI  2/21/2020    IR EXCHANGE NEPHRO PERC RT SI  4/13/2020    IR EXCHANGE NEPHRO PERC RT SI  7/17/2020    IR NEPHROSTOMY PERC RT PLC CATH  SI  10/14/2020    IR NEPHROURETERAL PERC RT PLC CATH NEW ACCESS  SI  4/30/2020    DC INTRO CATH DIALYSIS CIRCUIT DX ANGRPH FLUOR S&I Left 9/24/2020    FISTULOGRAM LEFT/poss permanent catheter placement performed by Alvin [facility]brano MD at [facility] C[facility]    VASCULAR SURGERY PROCEDURE UNLIST      lef AVF       Family History:  Family History   Problem Relation Age of Onset    Heart Surgery Sister        Social History:  Social History     Tobacco Use    Smoking status: Never Smoker    Smokeless tobacco: Never Used   Substance Use Topics    Alcohol use: Never    Drug use: Never       Allergies: Allergies   Allergen Reactions    Ciprofloxacin Hives    Statins-Hmg-Coa Reductase Inhibitors Other (comments)     Body ache         Review of Systems       Review of Systems   Constitutional: Positive for chills. Negative for fever. HENT: Negative for sore throat. Eyes: Negative for visual disturbance.    Respiratory: Negative for cough, chest tightness and shortness of breath. Cardiovascular: Negative for chest pain and palpitations. Gastrointestinal: Positive for abdominal pain, diarrhea, nausea and vomiting. Negative for blood in stool. Genitourinary: Positive for dysuria, flank pain and hematuria. Negative for vaginal bleeding and vaginal discharge. Musculoskeletal: Negative for myalgias. Skin: Negative for rash. Allergic/Immunologic: Negative for immunocompromised state. Neurological: Positive for weakness and light-headedness. Physical Exam     Visit Vitals  BP (!) 84/52   Pulse 78   Temp 98 °F (36.7 °C)   Resp 19   SpO2 99%         Physical Exam  Vitals and nursing note reviewed. Constitutional:       Appearance: She is ill-appearing. HENT:      Head: Atraumatic. Mouth/Throat:      Mouth: Mucous membranes are dry. Pharynx: Oropharynx is clear. Eyes:      Extraocular Movements: Extraocular movements intact. Pupils: Pupils are equal, round, and reactive to light. Cardiovascular:      Rate and Rhythm: Regular rhythm. Tachycardia present. Pulses: Normal pulses. Radial pulses are 2+ on the right side and 2+ on the left side. Heart sounds: Normal heart sounds. Pulmonary:      Effort: Pulmonary effort is normal.      Breath sounds: Normal breath sounds. No decreased breath sounds. Abdominal:      General: There is no distension. Palpations: Abdomen is soft. Tenderness: There is abdominal tenderness in the right lower quadrant and suprapubic area. There is no right CVA tenderness, left CVA tenderness or rebound. Negative signs include Rovsing's sign and McBurney's sign. Musculoskeletal:      Right lower le+ Edema present. Left lower le+ Edema present. Skin:     General: Skin is warm and dry. Neurological:      Mental Status: She is alert and oriented to person, place, and time.            Diagnostic Study Results     Labs -  Recent Results (from the past 12 hour(s))   METABOLIC PANEL, COMPREHENSIVE    Collection Time: 06/18/21  5:20 PM   Result Value Ref Range    Sodium 138 136 - 145 mmol/L    Potassium 4.2 3.5 - 5.5 mmol/L    Chloride 97 (L) 100 - 111 mmol/L    CO2 34 (H) 21 - 32 mmol/L    Anion gap 7 3.0 - 18 mmol/L    Glucose 117 (H) 74 - 99 mg/dL    BUN 19 (H) 7.0 - 18 MG/DL    Creatinine 4.58 (H) 0.6 - 1.3 MG/DL    BUN/Creatinine ratio 4 (L) 12 - 20      GFR est AA 11 (L) >60 ml/min/1.73m2    GFR est non-AA 9 (L) >60 ml/min/1.73m2    Calcium 9.2 8.5 - 10.1 MG/DL    Bilirubin, total 0.6 0.2 - 1.0 MG/DL    ALT (SGPT) 56 13 - 56 U/L    AST (SGOT) 46 (H) 10 - 38 U/L    Alk. phosphatase 133 (H) 45 - 117 U/L    Protein, total 8.6 (H) 6.4 - 8.2 g/dL    Albumin 3.7 3.4 - 5.0 g/dL    Globulin 4.9 (H) 2.0 - 4.0 g/dL    A-G Ratio 0.8 0.8 - 1.7     CBC WITH AUTOMATED DIFF    Collection Time: 06/18/21  5:20 PM   Result Value Ref Range    WBC 7.1 4.6 - 13.2 K/uL    RBC 3.89 (L) 4.20 - 5.30 M/uL    HGB 12.1 12.0 - 16.0 g/dL    HCT 38.1 35.0 - 45.0 %    MCV 97.9 (H) 74.0 - 97.0 FL    MCH 31.1 24.0 - 34.0 PG    MCHC 31.8 31.0 - 37.0 g/dL    RDW 17.4 (H) 11.6 - 14.5 %    PLATELET 012 363 - 947 K/uL    MPV 10.4 9.2 - 11.8 FL    NEUTROPHILS 49 40 - 73 %    LYMPHOCYTES 38 21 - 52 %    MONOCYTES 10 3 - 10 %    EOSINOPHILS 2 0 - 5 %    BASOPHILS 1 0 - 2 %    ABS. NEUTROPHILS 3.5 1.8 - 8.0 K/UL    ABS. LYMPHOCYTES 2.7 0.9 - 3.6 K/UL    ABS. MONOCYTES 0.7 0.05 - 1.2 K/UL    ABS. EOSINOPHILS 0.1 0.0 - 0.4 K/UL    ABS.  BASOPHILS 0.1 0.0 - 0.1 K/UL    DF AUTOMATED     URINALYSIS W/ RFLX MICROSCOPIC    Collection Time: 06/18/21  5:21 PM   Result Value Ref Range    Color DARK YELLOW      Appearance TURBID      Specific gravity 1.021 1.005 - 1.030      pH (UA) 8.5 (H) 5.0 - 8.0      Protein >1,000 (A) NEG mg/dL    Glucose Negative NEG mg/dL    Ketone Negative NEG mg/dL    Bilirubin Negative NEG      Blood LARGE (A) NEG      Urobilinogen 0.2 0.2 - 1.0 EU/dL    Nitrites Negative NEG      Leukocyte Esterase LARGE (A) NEG     URINE MICROSCOPIC ONLY    Collection Time: 06/18/21  5:21 PM   Result Value Ref Range    WBC TOO NUMEROUS TO COUNT 0 - 4 /hpf    RBC 4 to 5 0 - 5 /hpf    Epithelial cells 1+ 0 - 5 /lpf    Bacteria 1+ (A) NEG /hpf   POC LACTIC ACID    Collection Time: 06/18/21  5:35 PM   Result Value Ref Range    Lactic Acid (POC) 3.47 (HH) 0.40 - 2.00 mmol/L   EKG, 12 LEAD, INITIAL    Collection Time: 06/18/21  5:54 PM   Result Value Ref Range    Ventricular Rate 99 BPM    Atrial Rate 93 BPM    QRS Duration 86 ms    Q-T Interval 360 ms    QTC Calculation (Bezet) 462 ms    Calculated R Axis 90 degrees    Calculated T Axis 38 degrees    Diagnosis       Atrial fibrillation  Anterolateral infarct (cited on or before 16-FEB-2021)  Abnormal ECG  When compared with ECG of 16-FEB-2021 13:03,  Atrial fibrillation has replaced Sinus rhythm  Questionable change in QRS axis  Nonspecific T wave abnormality now evident in Inferior leads     POC LACTIC ACID    Collection Time: 06/18/21 10:58 PM   Result Value Ref Range    Lactic Acid (POC) 1.37 0.40 - 2.00 mmol/L       Radiologic Studies -   CT ABD PELV WO CONT   Final Result    Right ureteral stent which is well-positioned. No evidence to suggest   obstruction or complication. Atrophic kidneys with bilateral cysts. Mild splenomegaly. XR CHEST PORT    (Results Pending)         Medical Decision Making   I am the first provider for this patient. I reviewed the vital signs, available nursing notes, past medical history, past surgical history, family history and social history. Vital Signs-Reviewed the patient's vital signs. EKG: Atrial fibrillation. Rate of 99. QRS narrow. QTc 462. No evidence of acute ischemia. Normal axis.     Records Reviewed: Nursing Notes, Old Medical Records, Previous Radiology Studies and Previous Laboratory Studies (Time of Review: 7:09 PM)    ED Course: Progress Notes, Reevaluation, and Consults:         Provider Notes (Medical Decision Making):   MDM    75-year-old female with past medical history of ESRD on dialysis and midodrine, kidney stones, chronic UTIs status post stent presenting with flank pain, hematuria. Vitals significant for hypertension and tachycardia. Physical exam significant for suprapubic pain wrapping around the right side. Patient concerning for severe sepsis. Ordered the sepsis bundle to include lactates, blood cultures, urinalysis, urine cultures, fluids, and antibiotics. Patient has had several UTIs in the past.  Last 2 urine cultures have grown pansensitive E. coli and pansensitive Pseudomonas. Cefepime ordered to cover for potential urinary tract infection. No leukocytosis or anemia  BMP consistent with dialysis today  Lactic 3.47  UA significant for over thousand protein, large blood, large leuk esterase, and bacteria. CT abdomen pelvis showed patent stents without hydronephrosis. Chest x-ray interpretation: No pneumonia, pneumothorax, or pleural effusions. Relatively normal chest x-ray. Repeat lactic decreased to 1.4 following 1.5 L of fluids. Receiving final 500 cc of fluids  Vital signs have improved to maps in the 70s and is no longer tachycardic. I personally continuously checked up on the patient to ensure improvement of vital signs. Patient given 1 g Tylenol for pain. Discussed presentation, history, ED course with Dr. Dash Castellanos, on-call hospitalist.  Dr. Dash Castellanos agreed to evaluate the patient. Following evaluation, Dr. Dash Castellanos agreedthat patient will be stable for the floor as she is responded so well to the fluids. Shelby Lopez MD MSc  Emergency Medicine PGY-1        Diagnosis     Clinical Impression:   1. Sepsis, due to unspecified organism, unspecified whether acute organ dysfunction present ([address]    2.  Urinary tract infection with hematuria, site unspecified        Disposition: admitted    Follow-up Information    None Patient's Medications   Start Taking    No medications on file   Continue Taking    ACETAMINOPHEN (TYLENOL) 325 MG TABLET    Take 650 mg by mouth two (2) times a day. ALLOPURINOL (ZYLOPRIM) 100 MG TABLET    Take 200 mg by mouth daily. ASCORBIC ACID, VITAMIN C, (VITAMIN C) 500 MG TABLET    Take 500 mg by mouth daily. B COMPLEX VITAMINS (VITAMINS B COMPLEX) TABLET    Take 1 Tab by mouth daily. BIOTIN 1,000 MCG CHEW    Take 1 Tab by mouth daily. CALCITRIOL (ROCALTROL) 0.25 MCG CAPSULE    Take 0.25 mcg by mouth daily. CHOLECALCIFEROL (VITAMIN D3) (2,000 UNITS /50 MCG) CAP CAPSULE    Take 2,000 Units by mouth two (2) times a day. Take two tabs a total of 4000 units    CYANOCOBALAMIN 1,000 MCG TABLET    Take 1,000 mcg by mouth daily. ESTRADIOL (ESTRACE) 0.01 % (0.1 MG/GRAM) VAGINAL CREAM    Apply a fingertip amount around the urethra three times a week. FLUDROCORTISONE (FLORINEF) 0.1 MG TABLET    Take 1 Tab by mouth daily. GABAPENTIN (NEURONTIN) 100 MG CAPSULE    Take 100 mg by mouth nightly. AS needed    HYDRALAZINE (APRESOLINE) 10 MG TABLET    Take 1 Tab by mouth four (4) times daily. LACTOBACILLUS SP. 50 BILLION CPU (BIO-K PLUS) 50 BILLION CELL -375 MG CAP CAPSULE    Take 1 Cap by mouth daily. LATANOPROST (XALATAN) 0.005 % OPHTHALMIC SOLUTION    Administer 1 Drop to both eyes nightly. One drop at bedtime    LEVOFLOXACIN (LEVAQUIN) 500 MG TABLET    Take 1 Tab by mouth daily. LEVOTHYROXINE (SYNTHROID) 125 MCG TABLET    Take 125 mcg by mouth Daily (before breakfast). MIDODRINE (PROAMATINE) 2.5 MG TABLET    Take 1 Tab by mouth two (2) times a day for 180 days. OMEPRAZOLE (PRILOSEC) 20 MG CAPSULE    Take 20 mg by mouth daily. ONDANSETRON (ZOFRAN ODT) 4 MG DISINTEGRATING TABLET    Take 4 mg by mouth every eight (8) hours as needed for Nausea or Vomiting. ONDANSETRON HCL (ZOFRAN) 4 MG TABLET    Take 1 Tab by mouth every eight (8) hours as needed for Nausea or Vomiting. SERTRALINE (ZOLOFT) 25 MG TABLET    Take 25 mg by mouth daily. SODIUM BICARBONATE 650 MG TABLET    Take 2 Tabs by mouth three (3) times daily. Indications: excess body acid    TAMSULOSIN (FLOMAX) 0.4 MG CAPSULE    Take 1 Cap by mouth daily. TRIMETHOPRIM-SULFAMETHOXAZOLE (BACTRIM DS, SEPTRA DS) 160-800 MG PER TABLET    Take 1 Tab by mouth two (2) times a day. VIT B CMPLX 3-FA-VIT C-BIOTIN (NEPHRO HOWIE RX) 1- MG-MG-MCG TABLET    Take 1 Tab by mouth daily. These Medications have changed    No medications on file   Stop Taking    No medications on file     Disclaimer: Sections of this note are dictated using utilizing voice recognition software. Minor typographical errors may be present. If questions arise, please do not hesitate to contact me or call our department.

## 2021-06-18 NOTE — Clinical Note
Status[de-identified] INPATIENT [101]   Type of Bed: Medical [8]   Cardiac Monitoring Required?: Yes   Inpatient Hospitalization Certified Necessary for the Following Reasons: 3.  Patient receiving treatment that can only be provided in an inpatient setting (further clarification in H&P documentation)   Admitting Diagnosis: UTI (urinary tract infection) [608146]   Admitting Diagnosis: Sepsis [facility]) [[id number]]   Admitting Physician: Gerardo Aldana [[id number]]   Attending Physician: Bruce Fernandez   Estimated Length of Stay: 2 Midnights   Discharge Plan[de-identified] Home with Office Follow-up

## 2021-06-19 LAB
ATRIAL RATE: 93 BPM
CALCULATED R AXIS, ECG10: 90 DEGREES
CALCULATED T AXIS, ECG11: 38 DEGREES
DIAGNOSIS, 93000: NORMAL
Q-T INTERVAL, ECG07: 360 MS
QRS DURATION, ECG06: 86 MS
QTC CALCULATION (BEZET), ECG08: 462 MS
VENTRICULAR RATE, ECG03: 99 BPM

## 2021-06-19 PROCEDURE — 74011000250 HC RX REV CODE- 250: Performed by: INTERNAL MEDICINE

## 2021-06-19 PROCEDURE — 74011250636 HC RX REV CODE- 250/636: Performed by: INTERNAL MEDICINE

## 2021-06-19 PROCEDURE — APPSS30 APP SPLIT SHARED TIME 16-30 MINUTES: Performed by: NURSE PRACTITIONER

## 2021-06-19 PROCEDURE — 74011250637 HC RX REV CODE- 250/637: Performed by: INTERNAL MEDICINE

## 2021-06-19 PROCEDURE — 2709999900 HC NON-CHARGEABLE SUPPLY

## 2021-06-19 PROCEDURE — 99226 PR SBSQ OBSERVATION CARE/DAY 35 MINUTES: CPT | Performed by: NURSE PRACTITIONER

## 2021-06-19 PROCEDURE — 96372 THER/PROPH/DIAG INJ SC/IM: CPT

## 2021-06-19 PROCEDURE — 97530 THERAPEUTIC ACTIVITIES: CPT

## 2021-06-19 PROCEDURE — 74011250636 HC RX REV CODE- 250/636: Performed by: FAMILY MEDICINE

## 2021-06-19 PROCEDURE — 97161 PT EVAL LOW COMPLEX 20 MIN: CPT

## 2021-06-19 PROCEDURE — 97165 OT EVAL LOW COMPLEX 30 MIN: CPT

## 2021-06-19 PROCEDURE — 65660000004 HC RM CVT STEPDOWN

## 2021-06-19 PROCEDURE — 97535 SELF CARE MNGMENT TRAINING: CPT

## 2021-06-19 PROCEDURE — 96376 TX/PRO/DX INJ SAME DRUG ADON: CPT

## 2021-06-19 PROCEDURE — 99218 HC RM OBSERVATION: CPT

## 2021-06-19 RX ORDER — SODIUM CHLORIDE 9 MG/ML
500 INJECTION, SOLUTION INTRAVENOUS ONCE
Status: COMPLETED | OUTPATIENT
Start: 2021-06-19 | End: 2021-06-19

## 2021-06-19 RX ORDER — MIDODRINE HYDROCHLORIDE 5 MG/1
10 TABLET ORAL ONCE
Status: COMPLETED | OUTPATIENT
Start: 2021-06-19 | End: 2021-06-19

## 2021-06-19 RX ORDER — FLUDROCORTISONE ACETATE 0.1 MG/1
0.1 TABLET ORAL DAILY
Status: DISCONTINUED | OUTPATIENT
Start: 2021-06-19 | End: 2021-06-21 | Stop reason: HOSPADM

## 2021-06-19 RX ORDER — MIDODRINE HYDROCHLORIDE 5 MG/1
2.5 TABLET ORAL 2 TIMES DAILY
Status: DISCONTINUED | OUTPATIENT
Start: 2021-06-19 | End: 2021-06-19

## 2021-06-19 RX ORDER — ACETAMINOPHEN 650 MG/1
650 SUPPOSITORY RECTAL
Status: DISCONTINUED | OUTPATIENT
Start: 2021-06-19 | End: 2021-06-21 | Stop reason: HOSPADM

## 2021-06-19 RX ORDER — PANTOPRAZOLE SODIUM 20 MG/1
20 TABLET, DELAYED RELEASE ORAL
Status: DISCONTINUED | OUTPATIENT
Start: 2021-06-19 | End: 2021-06-21 | Stop reason: HOSPADM

## 2021-06-19 RX ORDER — SODIUM CHLORIDE 0.9 % (FLUSH) 0.9 %
5-40 SYRINGE (ML) INJECTION EVERY 8 HOURS
Status: DISCONTINUED | OUTPATIENT
Start: 2021-06-19 | End: 2021-06-21 | Stop reason: HOSPADM

## 2021-06-19 RX ORDER — ONDANSETRON 4 MG/1
4 TABLET, ORALLY DISINTEGRATING ORAL
Status: DISCONTINUED | OUTPATIENT
Start: 2021-06-19 | End: 2021-06-21 | Stop reason: HOSPADM

## 2021-06-19 RX ORDER — POLYETHYLENE GLYCOL 3350 17 G/17G
17 POWDER, FOR SOLUTION ORAL DAILY PRN
Status: DISCONTINUED | OUTPATIENT
Start: 2021-06-19 | End: 2021-06-21 | Stop reason: HOSPADM

## 2021-06-19 RX ORDER — SODIUM CHLORIDE 0.9 % (FLUSH) 0.9 %
5-40 SYRINGE (ML) INJECTION AS NEEDED
Status: DISCONTINUED | OUTPATIENT
Start: 2021-06-19 | End: 2021-06-21 | Stop reason: HOSPADM

## 2021-06-19 RX ORDER — MIDODRINE HYDROCHLORIDE 5 MG/1
10 TABLET ORAL
Status: COMPLETED | OUTPATIENT
Start: 2021-06-19 | End: 2021-06-19

## 2021-06-19 RX ORDER — ASCORBIC ACID 250 MG
500 TABLET ORAL DAILY
Status: DISCONTINUED | OUTPATIENT
Start: 2021-06-19 | End: 2021-06-21 | Stop reason: HOSPADM

## 2021-06-19 RX ORDER — HEPARIN SODIUM 5000 [USP'U]/ML
5000 INJECTION, SOLUTION INTRAVENOUS; SUBCUTANEOUS EVERY 8 HOURS
Status: DISCONTINUED | OUTPATIENT
Start: 2021-06-19 | End: 2021-06-21 | Stop reason: HOSPADM

## 2021-06-19 RX ORDER — MIDODRINE HYDROCHLORIDE 5 MG/1
10 TABLET ORAL 3 TIMES DAILY
Status: DISCONTINUED | OUTPATIENT
Start: 2021-06-20 | End: 2021-06-21 | Stop reason: HOSPADM

## 2021-06-19 RX ORDER — GABAPENTIN 100 MG/1
100 CAPSULE ORAL ONCE
Status: COMPLETED | OUTPATIENT
Start: 2021-06-19 | End: 2021-06-19

## 2021-06-19 RX ORDER — TAMSULOSIN HYDROCHLORIDE 0.4 MG/1
0.4 CAPSULE ORAL DAILY
Status: DISCONTINUED | OUTPATIENT
Start: 2021-06-19 | End: 2021-06-21 | Stop reason: HOSPADM

## 2021-06-19 RX ORDER — DULOXETIN HYDROCHLORIDE 20 MG/1
20 CAPSULE, DELAYED RELEASE ORAL DAILY
COMMUNITY

## 2021-06-19 RX ORDER — ACETAMINOPHEN 325 MG/1
650 TABLET ORAL
Status: DISCONTINUED | OUTPATIENT
Start: 2021-06-19 | End: 2021-06-21 | Stop reason: HOSPADM

## 2021-06-19 RX ORDER — ONDANSETRON 2 MG/ML
4 INJECTION INTRAMUSCULAR; INTRAVENOUS
Status: DISCONTINUED | OUTPATIENT
Start: 2021-06-19 | End: 2021-06-21 | Stop reason: HOSPADM

## 2021-06-19 RX ORDER — DULOXETIN HYDROCHLORIDE 20 MG/1
20 CAPSULE, DELAYED RELEASE ORAL
Status: COMPLETED | OUTPATIENT
Start: 2021-06-19 | End: 2021-06-19

## 2021-06-19 RX ORDER — MIDODRINE HYDROCHLORIDE 5 MG/1
5 TABLET ORAL 2 TIMES DAILY
Status: DISCONTINUED | OUTPATIENT
Start: 2021-06-19 | End: 2021-06-19

## 2021-06-19 RX ADMIN — MIDODRINE HYDROCHLORIDE 10 MG: 5 TABLET ORAL at 23:35

## 2021-06-19 RX ADMIN — MIDODRINE HYDROCHLORIDE 5 MG: 5 TABLET ORAL at 17:49

## 2021-06-19 RX ADMIN — TAMSULOSIN HYDROCHLORIDE 0.4 MG: 0.4 CAPSULE ORAL at 11:34

## 2021-06-19 RX ADMIN — DULOXETINE HYDROCHLORIDE 20 MG: 20 CAPSULE, DELAYED RELEASE ORAL at 06:25

## 2021-06-19 RX ADMIN — LEVOTHYROXINE SODIUM 125 MCG: 25 TABLET ORAL at 11:37

## 2021-06-19 RX ADMIN — GABAPENTIN 100 MG: 100 CAPSULE ORAL at 23:35

## 2021-06-19 RX ADMIN — MIDODRINE HYDROCHLORIDE 2.5 MG: 5 TABLET ORAL at 11:34

## 2021-06-19 RX ADMIN — FLUDROCORTISONE ACETATE 0.1 MG: 0.1 TABLET ORAL at 13:10

## 2021-06-19 RX ADMIN — HEPARIN SODIUM 5000 UNITS: 5000 INJECTION INTRAVENOUS; SUBCUTANEOUS at 23:35

## 2021-06-19 RX ADMIN — Medication 10 ML: at 14:00

## 2021-06-19 RX ADMIN — PANTOPRAZOLE SODIUM 20 MG: 20 TABLET, DELAYED RELEASE ORAL at 23:35

## 2021-06-19 RX ADMIN — Medication 500 MG: at 11:37

## 2021-06-19 RX ADMIN — HEPARIN SODIUM 5000 UNITS: 5000 INJECTION INTRAVENOUS; SUBCUTANEOUS at 13:34

## 2021-06-19 RX ADMIN — Medication 10 ML: at 22:00

## 2021-06-19 RX ADMIN — ACETAMINOPHEN 650 MG: 325 TABLET ORAL at 14:30

## 2021-06-19 RX ADMIN — MIDODRINE HYDROCHLORIDE 10 MG: 5 TABLET ORAL at 05:55

## 2021-06-19 RX ADMIN — SODIUM CHLORIDE 500 ML: 900 INJECTION, SOLUTION INTRAVENOUS at 13:29

## 2021-06-19 RX ADMIN — CEFEPIME HYDROCHLORIDE 2 G: 2 INJECTION, POWDER, FOR SOLUTION INTRAVENOUS at 17:49

## 2021-06-19 NOTE — PROGRESS NOTES
PHYSICAL THERAPY EVALUATION AND DISCHARGE    Patient: Mercy Nam (68 y.o. female)  Date: 2021  Primary Diagnosis: UTI (urinary tract infection) [N39.0]  Sepsis ([facility] [A41.9]        Precautions: standard     PLOF: Patient reports he was independent with self care and functional mobility with WW/rollator. She lives with family in 2 story home with chair lift to second level. ASSESSMENT :  Based on the objective data described below, the patient is at her baseline level of mobility. Patient performs functional transfers and gait using RW with Mod I. She presents with steady gait and good dynamic balance with UE support. Pt denies dizziness with all functional mobility. Educated on safety with functional transfers, monitoring symptoms, and slow to rise. Skilled PT not indicated at this time and will discharge from PT caseload. BP sitting on stretcher: 116/77  BP standin/56        PLAN :  Recommendations and Planned Interventions:   No formal PT needs identified at this time. Discharge Recommendations: None  Further Equipment Recommendations for Discharge: N/A; pt has all necessary dME     SUBJECTIVE:   Patient stated I also have one with a seat.     OBJECTIVE DATA SUMMARY:     Past Medical History:   Diagnosis Date    Acidosis     Anemia     Arteriovenous fistula ([facility]     Chronic kidney disease     on HD at [facility] on [address] on MWF.      CKD (chronic kidney disease)     Diabetes (HCC)     no meds now    HLD (hyperlipidemia)     HTN (hypertension)     Hyperparathyroidism due to renal insufficiency (HCC)     Hypothyroid     Kidney stone     Lung mass     Recurrent UTI     Ureter, stricture     Uric acid nephrolithiasis     Urinary incontinence      Past Surgical History:   Procedure Laterality Date    HX APPENDECTOMY      HX CHOLECYSTECTOMY      HX GASTRIC BYPASS      HX KNEE ARTHROSCOPY      HX UROLOGICAL      right PCN placement    HX UROLOGICAL  2018    RIGHT URETEROSCOPY WITH HOLMIUM LASER    IR EXCHANGE NEPHRO PERC LT SI  2/21/2020    IR EXCHANGE NEPHRO PERC RT SI  4/13/2020    IR EXCHANGE NEPHRO PERC RT SI  7/17/2020    IR NEPHROSTOMY PERC RT PLC CATH  SI  10/14/2020    IR NEPHROURETERAL PERC RT PLC CATH NEW ACCESS  SI  4/30/2020    CO INTRO CATH DIALYSIS CIRCUIT DX ANGRPH FLUOR S&I Left 9/24/2020    FISTULOGRAM LEFT/poss permanent catheter placement performed by Param Parks MD at [facility] CATH LAB    VASCULAR SURGERY PROCEDURE UNLIST      lef AVF     Barriers to Learning/Limitations: None  Compensate with: N/A  Home Situation:   Home Situation  Home Environment: Private residence  One/Two Story Residence: Two story  Lift Chair Available: Yes  Living Alone: No  Support Systems: Family member(s)  Current DME Used/Available at Home: Walker, rollator, Walker, rolling, Transfer bench, Wheelchair  Critical Behavior:  Neurologic State: Alert  Orientation Level: Oriented X4  Cognition: Follows commands  Safety/Judgement: Awareness of environment; Fall prevention  Psychosocial  Patient Behaviors: Calm; Cooperative                 Strength BLE:    Strength: Generally decreased, functional           Tone & Sensation BLE:   Tone: Normal  Sensation: Intact     Range Of Motion BLE:  AROM: Within functional limits          Functional Mobility:  Bed Mobility:     Supine to Sit: Stand-by assistance  Sit to Supine: Supervision     Transfers:  Sit to Stand: Modified independent  Stand to Sit: Modified independent          Balance:   Sitting: Intact  Standing: Intact       Ambulation/Gait Training:  Distance (ft): 100 Feet (ft)  Assistive Device: Walker, rolling  Ambulation - Level of Assistance: Modified independent  Gait Description (WDL): Within defined limits     Pain:  Pain level pre-treatment: 6/10 chronic back pain  Pain level post-treatment: 6/10  Pain Intervention(s): Medication (see MAR);  Rest, Ice, Repositioning   Response to intervention: Nurse notified, See doc flow    Activity Tolerance:   Fair +  Please refer to the flowsheet for vital signs taken during this treatment. After treatment:   []         Patient left in no apparent distress sitting up in chair  [x]         Patient left in no apparent distress in bed  [x]         Call bell left within reach  []         Nursing notified  []         Caregiver present  []         Bed alarm activated  []         SCDs applied    COMMUNICATION/EDUCATION:   [x]         Role of Physical Therapy in the acute care setting. [x]         Fall prevention education was provided and the patient/caregiver indicated understanding. []         Patient/family have participated as able in goal setting and plan of care. []         Patient/family agree to work toward stated goals and plan of care. []         Patient understands intent and goals of therapy, but is neutral about his/her participation. []         Patient is unable to participate in goal setting/plan of care: ongoing with therapy staff.  []         Other:     Thank you for this referral.  Hao Ann, PT   Time Calculation: 23 mins      Eval Complexity: History: LOW Complexity : Zero comorbidities / personal factors that will impact the outcome / POCExam:LOW Complexity : 1-2 Standardized tests and measures addressing body structure, function, activity limitation and / or participation in recreation  Presentation: LOW Complexity : Stable, uncomplicated  Clinical Decision Making:Low Complexity    Overall Complexity:LOW

## 2021-06-19 NOTE — PROGRESS NOTES
Discussed case with Dr Ana Pang    Patient is here for sepsis tachy with maps under 65 and hypotensive  Lactic acid of 3    Patient needs to be hemodynamically stabilized  And trending with stable heart rate under 110 and MAPS consistently above 65 prior to consideration for admission to floor. Otherwise ICU admission advised. Will wait for provider note prior to consideration for admission. Isabel Gilmore MD  [facility]  [employer]  [phone]

## 2021-06-19 NOTE — PROGRESS NOTES
RENAL DAILY PROGRESS NOTE    Patient: Joshua Funez               Sex: female          DOA: 6/18/2021  4:51 PM        YOB: 1945      Age:  66 y.o.        LOS:  LOS: 1 day     Subjective:     Joshua Funez is a 66 y.o.  who presents with UTI (urinary tract infection) [N39.0]  Sepsis ([address] [A41.9]. Asked to evaluate for esrd,admitted with uti,hypotension. hx of reccurent uti,right ureteral stent changed in 3/2021.has chronic hypotension on midodrine pre dialysis  Chief complains: Patient denies nausea, vomiting, chest pain, dizziness, shortness of breath or headache.  - Reviewed last 24 hrs events     Current Facility-Administered Medications   Medication Dose Route Frequency    ascorbic acid (vitamin C) (VITAMIN C) tablet 500 mg  500 mg Oral DAILY    fludrocortisone (FLORINEF) tablet 0.1 mg  0.1 mg Oral DAILY    levothyroxine (SYNTHROID) tablet 125 mcg  125 mcg Oral ACB    midodrine (PROAMATINE) tablet 2.5 mg  2.5 mg Oral BID    pantoprazole (PROTONIX) tablet 20 mg  20 mg Oral QHS    tamsulosin (FLOMAX) capsule 0.4 mg  0.4 mg Oral DAILY    sodium chloride (NS) flush 5-40 mL  5-40 mL IntraVENous Q8H    sodium chloride (NS) flush 5-40 mL  5-40 mL IntraVENous PRN    acetaminophen (TYLENOL) tablet 650 mg  650 mg Oral Q6H PRN    Or    acetaminophen (TYLENOL) suppository 650 mg  650 mg Rectal Q6H PRN    polyethylene glycol (MIRALAX) packet 17 g  17 g Oral DAILY PRN    ondansetron (ZOFRAN ODT) tablet 4 mg  4 mg Oral Q8H PRN    Or    ondansetron (ZOFRAN) injection 4 mg  4 mg IntraVENous Q6H PRN    heparin (porcine) injection 5,000 Units  5,000 Units SubCUTAneous Q8H    sodium chloride (NS) flush 5-10 mL  5-10 mL IntraVENous PRN    cefepime (MAXIPIME) 2 g in sterile water (preservative free) 10 mL IV syringe  2 g IntraVENous Q24H     Current Outpatient Medications   Medication Sig    DULoxetine (Cymbalta) 20 mg capsule Take 20 mg by mouth daily.     levoFLOXacin (LEVAQUIN) 500 mg tablet Take 1 Tab by mouth daily.  fludrocortisone (FLORINEF) 0.1 mg tablet Take 1 Tab by mouth daily.  trimethoprim-sulfamethoxazole (BACTRIM DS, SEPTRA DS) 160-800 mg per tablet Take 1 Tab by mouth two (2) times a day.  ondansetron hcl (ZOFRAN) 4 mg tablet Take 1 Tab by mouth every eight (8) hours as needed for Nausea or Vomiting.  sertraline (Zoloft) 25 mg tablet Take 25 mg by mouth daily. (Patient not taking: Reported on 6/19/2021)    midodrine (PROAMATINE) 2.5 mg tablet Take 1 Tab by mouth two (2) times a day for 180 days.  b complex vitamins (Vitamins B Complex) tablet Take 1 Tab by mouth daily.  estradioL (Estrace) 0.01 % (0.1 mg/gram) vaginal cream Apply a fingertip amount around the urethra three times a week.  hydrALAZINE (APRESOLINE) 10 mg tablet Take 1 Tab by mouth four (4) times daily.  biotin 1,000 mcg chew Take 1 Tab by mouth daily.  cyanocobalamin 1,000 mcg tablet Take 1,000 mcg by mouth daily.  gabapentin (NEURONTIN) 100 mg capsule Take 100 mg by mouth nightly. AS needed    lactobacillus sp. 50 billion cpu (BIO-K PLUS) 50 billion cell -375 mg cap capsule Take 1 Cap by mouth daily.  tamsulosin (FLOMAX) 0.4 mg capsule Take 1 Cap by mouth daily.  sodium bicarbonate 650 mg tablet Take 2 Tabs by mouth three (3) times daily. Indications: excess body acid (Patient taking differently: Take 1,300 mg by mouth two (2) times a day. 3 tabs bid  Indications: excess body acid)    acetaminophen (TYLENOL) 325 mg tablet Take 650 mg by mouth two (2) times a day.  allopurinoL (ZYLOPRIM) 100 mg tablet Take 200 mg by mouth daily.  ascorbic acid, vitamin C, (VITAMIN C) 500 mg tablet Take 500 mg by mouth daily.  calcitRIOL (ROCALTROL) 0.25 mcg capsule Take 0.25 mcg by mouth daily.  cholecalciferol (VITAMIN D3) (2,000 UNITS /50 MCG) cap capsule Take 2,000 Units by mouth two (2) times a day.  Take two tabs a total of 4000 units    latanoprost (XALATAN) 0.005 % ophthalmic solution Administer 1 Drop to both eyes nightly. One drop at bedtime    levothyroxine (SYNTHROID) 125 mcg tablet Take 125 mcg by mouth Daily (before breakfast).  omeprazole (PRILOSEC) 20 mg capsule Take 20 mg by mouth daily.  ondansetron (ZOFRAN ODT) 4 mg disintegrating tablet Take 4 mg by mouth every eight (8) hours as needed for Nausea or Vomiting.  vit B Cmplx 3-FA-Vit C-Biotin (NEPHRO HOWIE RX) 1- mg-mg-mcg tablet Take 1 Tab by mouth daily. Objective:     Visit Vitals  BP (!) 78/51   Pulse 81   Temp 98.1 °F (36.7 °C)   Resp 16   SpO2 99%     No intake or output data in the 24 hours ending 06/19/21 1352    Physical Examination:     GEN: AAO X 3, NAD  RS: Chest is bilateral equal, no wheezing / rales / crackles  CVS: S1-S2 heard,  Abdomen: Soft, Non tender, Not distended, Positive bowel sounds, no organomegaly, no CVA / supra pubic tenderness  Extremities: No edema, no cyanosis, skin is warm on touch  CNS: Awake & follows commands,   HEENT: Head is atraumatic, PERRLA, conjunctiva pink & non icteric. No JVD or carotid bruit     Data Review:      Labs:     Hematology:   Recent Labs     06/18/21  1720   WBC 7.1   HGB 12.1   HCT 38.1     Chemistry:   Recent Labs     06/18/21  1720   BUN 19*   CREA 4.58*   CA 9.2   ALB 3.7   K 4.2      CL 97*   CO2 34*   *        Images:    XR (Most Recent). CXR reviewed by me and compared with previous CXR Results from Hospital Encounter encounter on 06/18/21    XR CHEST PORT    Narrative  CHEST PORTABLE 1718 hours    COMPARISON: None. INDICATIONS: SIRS criteria. FINDINGS:    Portable single view chest demonstrates:    Lungs: Clear. Cardiac Silhouette And Mediastinal Contours: The aorta is tortuous. Otherwise  unremarkable. Pleural Spaces: No pneumothorax or pleural effusion evident. Bones And Soft Tissues: Unremarkable for age. Impression  No active or acute cardiopulmonary process is evident.        CT (Most Recent) Results from Hospital Encounter encounter on 06/18/21    CT ABD PELV WO CONT    Narrative  EXAM: CT of the Abdomen and Pelvis without IV contrast    CLINICAL INDICATION:  Evaluate for hydro/worsening stricture . Flank pain. Patient on dialysis. TECHNIQUE: CT of the abdomen and pelvis. Sagittal and coronal reformations    All CT scans at this facility are performed using dose optimization technique as  appropriate to a performed exam, to include automated exposure control,  adjustment of the mA and/or kV according to patient size (including appropriate  matching for site specific examination) or use of iterative reconstruction  technique. IV CONTRAST: None    ENTERIC CONTRAST: None    COMPARISON: Retrograde pyelogram dated 3/2/2021    FINDINGS:  Limitations: The evaluation of the solid organs is limited due to the lack of IV  contrast.    Lower Chest: No acute infiltrate or effusion appreciated. The heart and  pericardium are unremarkable. Peritoneum: No free air appreciated. No free fluid present. No fluid collections  present. Liver: No focal lesion appreciated. Biliary/Gallbladder: The biliary tree is mildly prominent. This is likely  related to postcholecystectomy status. Neck are surgically absent. Spleen: Splenomegaly is present. Pancreas: No focal lesion appreciated. The pancreatic duct is unremarkable. No  peripancreatic inflammation or adenopathy. : The adrenal glands are unremarkable. The kidneys are atrophic. The right  kidney as diminutive exophytic hypodensities which likely represent cysts. A  prominent right extrarenal pelvis is noted. A ureteral stent is noted within the  renal pelvis and ureter with the distal end within the bladder. No evidence to  suggest obstruction. No urolithiasis appreciated along the course of the ureter  or stent. The left kidney is atrophic with multiple likely cyst. The left  collecting system is no evidence of hydroureteronephrosis.  No acute pathology in  the bladder. The uterus and adnexa are unremarkable. GI: Postoperative changes are noted in the stomach. The small bowel is without  evidence of obstruction. No small bowel wall thickening. The mesentery is  without inflammation or adenopathy. The appendix is unremarkable. A few  diverticula are noted. Aorta and retroperitoneum: The abdominal aorta is mildly tortuous. No periaortic  adenopathy seen. No fluid collections in the retroperitoneum appreciated. Abdominal wall/Inguinal: Mild subcutaneous edema is noted. Musculoskeletal: Multilevel degenerative disc disease and facet arthropathy are  noted. Bilateral hip osteoarthritis is noted. Impression  Right ureteral stent which is well-positioned. No evidence to suggest  obstruction or complication. Atrophic kidneys with bilateral cysts. Mild splenomegaly. EKG No results found for this or any previous visit. I have personally reviewed the old medical records and patient's labs    Plan / Recommendation:      1.  Esrd,on dialysis mon wed Friday  2.chronic hypotension,repeat bp is 95 which is baseline,on midodrine  3.reccurent uti,continue current antibiotics,cultures pending    D/w Dr. Lobo Ruiz MD  Nephrology  6/19/2021

## 2021-06-19 NOTE — ED NOTES
Report given to [address] [place] [address] Road. Patient escorted to room by nurse. Patient is alert and oriented. No signs or symptoms of distress.

## 2021-06-19 NOTE — PROGRESS NOTES
Progress Note    Patient: Neena Mccartney MRN: [id number]  CSN: [id number]    YOB: 1945  Age: 66 y.o. Sex: female    DOA: 6/18/2021 LOS:  LOS: 1 day               Subjective:     Pt seen and examined while in ED. She denies dizziness, lightheadedness. States she was treated a few weeks ago for UTI but right side back pain never really resolved. Also states she still had some hematuria. Tolerated HD well yesterday and was able to complete. Chief Complaint:   Chief Complaint   Patient presents with    Nausea    Vomiting           Assessment/Plan       Assessment  1. Sepsis, POA w/ tachycardia, lactic acidosis, hypotension  2. Hypotension, with hx of HTN  3. N/V  4. UTI w/ hematuria  5. Right flank pain  6. ESRD on HD  7. Acquired hypothyroidism  8. T2DM, diet controlled  9. Hyperlipidemia  10. Hx right ureteral stricture managed with JJ stent          Plan  1. Continue IV Cefepime. CT a/p with well positioned right ureteral stent- pt states follows w/ urology and this was just exchanged in March 2021. Follow blood cultures collected 6/18 NGTD x 2. UA w/ large blood, large leukocyte esterase, TNTC WBCs, 1+ bacteria. Urine culture pending. 2. Nephrology consulted- spoke with Dr. Alfredo Jiménez, covering for [facility]. Appreciate assistance. Renally dose medications. HD per renal.  3. Monitor vital signs, weight, I/Os per unit protocol  4. Continue home meds  5. Fall precautions  6. PT, OT following- no recommendations at this time  7. CM following to assist w/ dc planning      Diet: Renal  Code status: DNR    Pt has elected to update her family on hospitalization.     Case discussed with:  [x]Patient  []Family  [x]Nursing  [x]Case Management  DVT Prophylaxis:  [x]Lovenox  []Hep SQ  []SCDs  []Coumadin   []On Heparin gtt      Carina YAIMA Ricketts  [facility]  pager [phone]      Objective:     Physical Exam:  Visit Vitals  BP 99/67   Pulse (!) 109   Temp 98.1 °F (36.7 °C)   Resp 18   SpO2 97%        General:         Alert, cooperative, no acute distress    HEENT: NC, Atraumatic. PERRLA, anicteric sclerae. Lungs: CTA Bilaterally. No Wheezing/Rhonchi/Rales. Heart:  Regular  rhythm,  No murmur, No Rubs, No Gallops  Abdomen: Soft, Non distended, Non tender. +Bowel sounds, no HSM  Extremities: No c/c/e. AV fistula LUE +thrill, bruit. Psych:   Good insight. Not anxious or agitated. Neurologic:   Alert and oriented X 3. No acute neurological deficits      Intake and Output:  Current Shift:  No intake/output data recorded. Last three shifts:  No intake/output data recorded. Labs: Results:       Chemistry Recent Labs     06/18/21  1720   *      K 4.2   CL 97*   CO2 34*   BUN 19*   CREA 4.58*   CA 9.2   AGAP 7   BUCR 4*   *   TP 8.6*   ALB 3.7   GLOB 4.9*   AGRAT 0.8      CBC w/Diff Recent Labs     06/18/21  1720   WBC 7.1   RBC 3.89*   HGB 12.1   HCT 38.1      GRANS 49   LYMPH 38   EOS 2      Cardiac Enzymes No results for input(s): CPK, CKND1, PATTI in the last 72 hours. No lab exists for component: CKRMB, TROIP   Coagulation No results for input(s): PTP, INR, APTT, INREXT, INREXT in the last 72 hours. Lipid Panel No results found for: CHOL, CHOLPOCT, CHOLX, CHLST, CHOLV, 624717, HDL, HDLP, LDL, LDLC, DLDLP, 804335, VLDLC, VLDL, TGLX, TRIGL, TRIGP, TGLPOCT, CHHD, CHHDX   BNP No results for input(s): BNPP in the last 72 hours.    Liver Enzymes Recent Labs     06/18/21  1720   TP 8.6*   ALB 3.7   *      Thyroid Studies No results found for: T4, T3U, TSH, TSHEXT, TSHEXT       Procedures/imaging: see electronic medical records for all procedures/Xrays and details which were not copied into this note but were reviewed prior to creation of Plan    Medications:   Current Facility-Administered Medications   Medication Dose Route Frequency    ascorbic acid (vitamin C) (VITAMIN C) tablet 500 mg  500 mg Oral DAILY    fludrocortisone (FLORINEF) tablet 0.1 mg  0.1 mg Oral DAILY    levothyroxine (SYNTHROID) tablet 125 mcg  125 mcg Oral ACB    midodrine (PROAMATINE) tablet 2.5 mg  2.5 mg Oral BID    pantoprazole (PROTONIX) tablet 20 mg  20 mg Oral QHS    tamsulosin (FLOMAX) capsule 0.4 mg  0.4 mg Oral DAILY    sodium chloride (NS) flush 5-40 mL  5-40 mL IntraVENous Q8H    sodium chloride (NS) flush 5-40 mL  5-40 mL IntraVENous PRN    acetaminophen (TYLENOL) tablet 650 mg  650 mg Oral Q6H PRN    Or    acetaminophen (TYLENOL) suppository 650 mg  650 mg Rectal Q6H PRN    polyethylene glycol (MIRALAX) packet 17 g  17 g Oral DAILY PRN    ondansetron (ZOFRAN ODT) tablet 4 mg  4 mg Oral Q8H PRN    Or    ondansetron (ZOFRAN) injection 4 mg  4 mg IntraVENous Q6H PRN    heparin (porcine) injection 5,000 Units  5,000 Units SubCUTAneous Q8H    sodium chloride (NS) flush 5-10 mL  5-10 mL IntraVENous PRN    cefepime (MAXIPIME) 2 g in sterile water (preservative free) 10 mL IV syringe  2 g IntraVENous Q24H     Current Outpatient Medications   Medication Sig    DULoxetine (Cymbalta) 20 mg capsule Take 20 mg by mouth daily.  levoFLOXacin (LEVAQUIN) 500 mg tablet Take 1 Tab by mouth daily.  fludrocortisone (FLORINEF) 0.1 mg tablet Take 1 Tab by mouth daily.  trimethoprim-sulfamethoxazole (BACTRIM DS, SEPTRA DS) 160-800 mg per tablet Take 1 Tab by mouth two (2) times a day.  ondansetron hcl (ZOFRAN) 4 mg tablet Take 1 Tab by mouth every eight (8) hours as needed for Nausea or Vomiting.  sertraline (Zoloft) 25 mg tablet Take 25 mg by mouth daily. (Patient not taking: Reported on 6/19/2021)    midodrine (PROAMATINE) 2.5 mg tablet Take 1 Tab by mouth two (2) times a day for 180 days.  b complex vitamins (Vitamins B Complex) tablet Take 1 Tab by mouth daily.  estradioL (Estrace) 0.01 % (0.1 mg/gram) vaginal cream Apply a fingertip amount around the urethra three times a week.     hydrALAZINE (APRESOLINE) 10 mg tablet Take 1 Tab by mouth four (4) times daily.  biotin 1,000 mcg chew Take 1 Tab by mouth daily.  cyanocobalamin 1,000 mcg tablet Take 1,000 mcg by mouth daily.  gabapentin (NEURONTIN) 100 mg capsule Take 100 mg by mouth nightly. AS needed    lactobacillus sp. 50 billion cpu (BIO-K PLUS) 50 billion cell -375 mg cap capsule Take 1 Cap by mouth daily.  tamsulosin (FLOMAX) 0.4 mg capsule Take 1 Cap by mouth daily.  sodium bicarbonate 650 mg tablet Take 2 Tabs by mouth three (3) times daily. Indications: excess body acid (Patient taking differently: Take 1,300 mg by mouth two (2) times a day. 3 tabs bid  Indications: excess body acid)    acetaminophen (TYLENOL) 325 mg tablet Take 650 mg by mouth two (2) times a day.  allopurinoL (ZYLOPRIM) 100 mg tablet Take 200 mg by mouth daily.  ascorbic acid, vitamin C, (VITAMIN C) 500 mg tablet Take 500 mg by mouth daily.  calcitRIOL (ROCALTROL) 0.25 mcg capsule Take 0.25 mcg by mouth daily.  cholecalciferol (VITAMIN D3) (2,000 UNITS /50 MCG) cap capsule Take 2,000 Units by mouth two (2) times a day. Take two tabs a total of 4000 units    latanoprost (XALATAN) 0.005 % ophthalmic solution Administer 1 Drop to both eyes nightly. One drop at bedtime    levothyroxine (SYNTHROID) 125 mcg tablet Take 125 mcg by mouth Daily (before breakfast).  omeprazole (PRILOSEC) 20 mg capsule Take 20 mg by mouth daily.  ondansetron (ZOFRAN ODT) 4 mg disintegrating tablet Take 4 mg by mouth every eight (8) hours as needed for Nausea or Vomiting.  vit B Cmplx 3-FA-Vit C-Biotin (NEPHRO HOWIE RX) 1- mg-mg-mcg tablet Take 1 Tab by mouth daily.

## 2021-06-19 NOTE — PROGRESS NOTES
OCCUPATIONAL THERAPY EVALUATION/DISCHARGE    Patient: Joshua Funez (50 y.o. female)  Date: 6/19/2021  Primary Diagnosis: UTI (urinary tract infection) [N39.0]  Sepsis ([address] [A41.9]        Precautions:   Contact  PLOF: Pt reports being Mod Ind for ADLs and functional mobility with FWW. Pt lives with supportive family and has all necessary DME for home safety. ASSESSMENT AND RECOMMENDATIONS:  Pt cleared to participate in OT evaluation by RN. Upon entering room, pt received on stretcher, alert, and agreeable to OT eval/treatment. Pt seen in conjunction with PT to maximize safety of patient and staff members. Based on the objective data described below, the patient presents with ability to perform basic ADLs and functional transfers at baseline level of function. Pt performed/simulated UB/LB ADLs with Mod Ind for seated and std aspects. Pt is using FWW at baseline for functional mobility. Pt demonstrates good safety awareness during all standing tasks. Pt lives with supportive family available to assist as needed and all necessary DME. Skilled occupational therapy is not indicated at this time. Discharge Recommendations: Home with family assistance prn  Further Equipment Recommendations for Discharge: N/A      SUBJECTIVE:   Patient stated I use tub chair to slide in the tub.     OBJECTIVE DATA SUMMARY:     Past Medical History:   Diagnosis Date    Acidosis     Anemia     Arteriovenous fistula ([address]     Chronic kidney disease     on HD at [facility] on [address] on MWF.      CKD (chronic kidney disease)     Diabetes (HCC)     no meds now    HLD (hyperlipidemia)     HTN (hypertension)     Hyperparathyroidism due to renal insufficiency (HCC)     Hypothyroid     Kidney stone     Lung mass     Recurrent UTI     Ureter, stricture     Uric acid nephrolithiasis     Urinary incontinence      Past Surgical History:   Procedure Laterality Date    HX APPENDECTOMY      HX CHOLECYSTECTOMY      HX GASTRIC BYPASS      HX KNEE ARTHROSCOPY      HX UROLOGICAL      right PCN placement    HX UROLOGICAL  07/23/2018    RIGHT URETEROSCOPY WITH HOLMIUM LASER    IR EXCHANGE NEPHRO PERC LT SI  2/21/2020    IR EXCHANGE NEPHRO PERC RT SI  4/13/2020    IR EXCHANGE NEPHRO PERC RT SI  7/17/2020    IR NEPHROSTOMY PERC RT PLC CATH  SI  10/14/2020    IR NEPHROURETERAL PERC RT PLC CATH NEW ACCESS  SI  4/30/2020    MT INTRO CATH DIALYSIS CIRCUIT DX ANGRPH FLUOR S&I Left 9/24/2020    FISTULOGRAM LEFT/poss permanent catheter placement performed by Sonia Lopez MD at [facility] CATH LAB    VASCULAR SURGERY PROCEDURE UNLIST      lef AVF     Barriers to Learning/Limitations: None  Compensate with: visual, verbal, tactile, kinesthetic cues/model    Home Situation:   Home Situation  Home Environment: Private residence  One/Two Story Residence: Two story  Lift Chair Available: Yes  Living Alone: No  Support Systems: Family member(s)  Current DME Used/Available at Home: Walker, rollator, Walker, rolling, Transfer bench, Wheelchair  Tub or Shower Type: Shower  [x]     Right hand dominant   []     Left hand dominant    Cognitive/Behavioral Status:  Neurologic State: Alert  Orientation Level: Oriented X4  Cognition: Follows commands  Safety/Judgement: Awareness of environment; Fall prevention    Skin: visible skin intact  Edema: none noted    Vision/Perceptual:       Acuity: Able to read clock/calendar on wall without difficulty     Coordination: BUE  Coordination: Generally decreased, functional  Fine Motor Skills-Upper: Left Intact; Right Intact    Gross Motor Skills-Upper: Left Intact; Right Intact  Balance:  Sitting: Intact  Standing: Intact  Strength: BUE  Strength: Generally decreased, functional   Tone & Sensation: BUE  Tone: Normal  Sensation: Impaired (bilateral hands)   Range of Motion: BUE  AROM: Generally decreased, functional (BUE rotator cuff injuries)     Functional Mobility and Transfers for ADLs:  Bed Mobility:     Supine to Sit: Stand-by assistance  Sit to Supine: Supervision     Transfers:  Sit to Stand: Modified independent  Stand to Sit: Modified independent   Toilet Transfer : Modified independent     Bathroom Mobility: Modified independent (simulater with FWW)    ADL Assessment:  Feeding: Modified independent    Oral Facial Hygiene/Grooming: Modified Independent    Bathing: Modified independent    Upper Body Dressing: Modified independent    Lower Body Dressing: Modified independent    Toileting: Modified independent     ADL Intervention:   Cognitive Retraining  Safety/Judgement: Awareness of environment; Fall prevention    Pain:  Pain level pre-treatment: not rated   Pain level post-treatment: not rated    Activity Tolerance:   Fair  Please refer to the flowsheet for vital signs taken during this treatment. After treatment:   []  Patient left in no apparent distress sitting up in chair  [x]  Patient left in no apparent distress in bed  [x]  Call bell left within reach  [x]  Nursing notified  []  Caregiver present  []  Bed alarm activated    COMMUNICATION/EDUCATION:   [x]      Role of Occupational Therapy in the acute care setting  [x]      Home safety education was provided and the patient/caregiver indicated understanding. [x]      Patient/family have participated as able and agree with findings and recommendations. []      Patient is unable to participate in plan of care at this time. Thank you for this referral.  Maurene Dakin, OTR/L  Time Calculation: 23 mins      Eval Complexity: History: LOW Complexity : Brief history review ; Examination: LOW Complexity : 1-3 performance deficits relating to physical, cognitive , or psychosocial skils that result in activity limitations and / or participation restrictions ;    Decision Making:LOW Complexity : No comorbidities that affect functional and no verbal or physical assistance needed to complete eval tasks

## 2021-06-19 NOTE — H&P
GENERAL GENERIC H&P/CONSULT    CC: Abdominal pain    Subjective:  64yo  female presenting for abdomenal pain. Past med hx of chronic kidney disease on dialysis MWF, Hypothyroidism, chronic UTI, obesity. Patient coming from dialysis feeling week with abdominal pain. Notes 1 month of hematuria and has been on cipro for past month. Endorses increased weakness and occasional dizziness. Denies chest pain or shortness of breath. Past Medical History:   Diagnosis Date    Acidosis     Anemia     Arteriovenous fistula (HCC)     Chronic kidney disease     [address] [facility] on MWF.  CKD (chronic kidney disease)     Diabetes (HCC)     no meds now    HLD (hyperlipidemia)     HTN (hypertension)     Hyperparathyroidism due to renal insufficiency (HCC)     Hypothyroid     Kidney stone     Lung mass     Recurrent UTI     Ureter, stricture     Uric acid nephrolithiasis     Urinary incontinence       Past Surgical History:   Procedure Laterality Date    HX APPENDECTOMY      HX CHOLECYSTECTOMY      HX GASTRIC BYPASS      HX KNEE ARTHROSCOPY      HX UROLOGICAL      right PCN placement    HX UROLOGICAL  07/23/2018    RIGHT URETEROSCOPY WITH HOLMIUM LASER    IR EXCHANGE NEPHRO PERC LT SI  2/21/2020    IR EXCHANGE NEPHRO PERC RT SI  4/13/2020    IR EXCHANGE NEPHRO PERC RT SI  7/17/2020    IR NEPHROSTOMY PERC RT PLC CATH  SI  10/14/2020    IR NEPHROURETERAL PERC RT PLC CATH NEW ACCESS  SI  4/30/2020    [place] INTRO CATH DIALYSIS CIRCUIT DX ANGRPH FLUOR S&I Left 9/24/2020    FISTULOGRAM LEFT/poss permanent catheter placement performed by Jose Ramon Martin MD at [facility] CATH LAB    VASCULAR SURGERY PROCEDURE UNLIST      lef AVF      Prior to Admission medications    Medication Sig Start Date End Date Taking? Authorizing Provider   levoFLOXacin (LEVAQUIN) 500 mg tablet Take 1 Tab by mouth daily. 2/23/21   Loc Mendoza MD   fludrocortisone (FLORINEF) 0.1 mg tablet Take 1 Tab by mouth daily. 1/27/21   Kimberli Walter MD   trimethoprim-sulfamethoxazole (BACTRIM DS, SEPTRA DS) 160-800 mg per tablet Take 1 Tab by mouth two (2) times a day. 12/31/20   Jame Paulino MD   ondansetron hcl (ZOFRAN) 4 mg tablet Take 1 Tab by mouth every eight (8) hours as needed for Nausea or Vomiting. 12/31/20   Maria Del Carmen Headley MD   sertraline (Zoloft) 25 mg tablet Take 25 mg by mouth daily. Provider, Historical   midodrine (PROAMATINE) 2.5 mg tablet Take 1 Tab by mouth two (2) times a day for 180 days. 12/24/20 6/22/21  Kimberli Walter MD   b complex vitamins (Vitamins B Complex) tablet Take 1 Tab by mouth daily. Provider, Historical   estradioL (Estrace) 0.01 % (0.1 mg/gram) vaginal cream Apply a fingertip amount around the urethra three times a week. 9/30/20   Paula Conley MD   hydrALAZINE (APRESOLINE) 10 mg tablet Take 1 Tab by mouth four (4) times daily. 9/2/20   Thuy Suarez NP   biotin 1,000 mcg chew Take 1 Tab by mouth daily. Provider, Historical   cyanocobalamin 1,000 mcg tablet Take 1,000 mcg by mouth daily. Provider, Historical   gabapentin (NEURONTIN) 100 mg capsule Take 100 mg by mouth nightly. AS needed    Provider, Historical   lactobacillus sp. 50 billion cpu (BIO-K PLUS) 50 billion cell -375 mg cap capsule Take 1 Cap by mouth daily. 2/25/20   Randy Quach MD   tamsulosin (FLOMAX) 0.4 mg capsule Take 1 Cap by mouth daily. 2/25/20   Randy Quach MD   sodium bicarbonate 650 mg tablet Take 2 Tabs by mouth three (3) times daily. Indications: excess body acid  Patient taking differently: Take 1,300 mg by mouth two (2) times a day. 3 tabs bid  Indications: excess body acid 2/24/20   Randy Quach MD   acetaminophen (TYLENOL) 325 mg tablet Take 650 mg by mouth two (2) times a day. Other, MD Lexus   allopurinoL (ZYLOPRIM) 100 mg tablet Take 200 mg by mouth daily. Other, MD Lexus   ascorbic acid, vitamin C, (VITAMIN C) 500 mg tablet Take 500 mg by mouth daily. Lexus Hogan MD   calcitRIOL (ROCALTROL) 0.25 mcg capsule Take 0.25 mcg by mouth daily. Lexus Hogan MD   cholecalciferol (VITAMIN D3) (2,000 UNITS /50 MCG) cap capsule Take 2,000 Units by mouth two (2) times a day. Take two tabs a total of 4000 units    Lexus Hogan MD   latanoprost (XALATAN) 0.005 % ophthalmic solution Administer 1 Drop to both eyes nightly. One drop at bedtime    Lexus Hogan MD   levothyroxine (SYNTHROID) 125 mcg tablet Take 125 mcg by mouth Daily (before breakfast). Lexus Hogan MD   omeprazole (PRILOSEC) 20 mg capsule Take 20 mg by mouth daily. Lexus Hogan MD   ondansetron (ZOFRAN ODT) 4 mg disintegrating tablet Take 4 mg by mouth every eight (8) hours as needed for Nausea or Vomiting. Lexus Hogan MD   vit B Cmplx 3-FA-Vit C-Biotin (NEPHRO HOWIE RX) 1- mg-mg-mcg tablet Take 1 Tab by mouth daily. Lexus Hogan MD     Allergies   Allergen Reactions    Ciprofloxacin Hives    Statins-Hmg-Coa Reductase Inhibitors Other (comments)     Body ache      Social History     Tobacco Use    Smoking status: Never Smoker    Smokeless tobacco: Never Used   Substance Use Topics    Alcohol use: Never      Family History   Problem Relation Age of Onset    Heart Surgery Sister       Review of Systems   Constitutional: Positive for activity change. HENT: Negative for congestion. Eyes: Negative for discharge. Respiratory: Negative for apnea, cough and shortness of breath. Cardiovascular: Negative for chest pain. Gastrointestinal: Positive for abdominal pain. Negative for abdominal distention. Endocrine: Negative for cold intolerance. Genitourinary: Positive for vaginal bleeding. Musculoskeletal: Negative for arthralgias. Skin: Negative for color change. Allergic/Immunologic: Negative for environmental allergies. Neurological: Negative for dizziness. Hematological: Negative for adenopathy. Psychiatric/Behavioral: Negative for agitation.        Objective:    No intake/output data recorded. No intake/output data recorded. Patient Vitals for the past 8 hrs:   BP Temp Pulse Resp SpO2   06/18/21 2228 (!) 84/52 98 °F (36.7 °C) 78 19 99 %   06/18/21 1631 (!) 93/57 97.9 °F (36.6 °C) (!) 118 18 97 %     Physical Exam  HENT:      Head: Normocephalic. Nose: Nose normal.      Mouth/Throat:      Mouth: Mucous membranes are moist.   Eyes:      Pupils: Pupils are equal, round, and reactive to light. Cardiovascular:      Rate and Rhythm: Normal rate. Pulses: Normal pulses. Pulmonary:      Effort: Pulmonary effort is normal.   Abdominal:      General: There is no distension. Tenderness: There is abdominal tenderness. Genitourinary:     Comments: deferred  Musculoskeletal:         General: Normal range of motion. Cervical back: Normal range of motion. Skin:     General: Skin is warm. Capillary Refill: Capillary refill takes less than 2 seconds. Neurological:      General: No focal deficit present. Mental Status: She is alert. Psychiatric:         Mood and Affect: Mood normal.          Labs:    Recent Results (from the past 24 hour(s))   METABOLIC PANEL, COMPREHENSIVE    Collection Time: 06/18/21  5:20 PM   Result Value Ref Range    Sodium 138 136 - 145 mmol/L    Potassium 4.2 3.5 - 5.5 mmol/L    Chloride 97 (L) 100 - 111 mmol/L    CO2 34 (H) 21 - 32 mmol/L    Anion gap 7 3.0 - 18 mmol/L    Glucose 117 (H) 74 - 99 mg/dL    BUN 19 (H) 7.0 - 18 MG/DL    Creatinine 4.58 (H) 0.6 - 1.3 MG/DL    BUN/Creatinine ratio 4 (L) 12 - 20      GFR est AA 11 (L) >60 ml/min/1.73m2    GFR est non-AA 9 (L) >60 ml/min/1.73m2    Calcium 9.2 8.5 - 10.1 MG/DL    Bilirubin, total 0.6 0.2 - 1.0 MG/DL    ALT (SGPT) 56 13 - 56 U/L    AST (SGOT) 46 (H) 10 - 38 U/L    Alk.  phosphatase 133 (H) 45 - 117 U/L    Protein, total 8.6 (H) 6.4 - 8.2 g/dL    Albumin 3.7 3.4 - 5.0 g/dL    Globulin 4.9 (H) 2.0 - 4.0 g/dL    A-G Ratio 0.8 0.8 - 1.7     CBC WITH AUTOMATED DIFF Collection Time: 06/18/21  5:20 PM   Result Value Ref Range    WBC 7.1 4.6 - 13.2 K/uL    RBC 3.89 (L) 4.20 - 5.30 M/uL    HGB 12.1 12.0 - 16.0 g/dL    HCT 38.1 35.0 - 45.0 %    MCV 97.9 (H) 74.0 - 97.0 FL    MCH 31.1 24.0 - 34.0 PG    MCHC 31.8 31.0 - 37.0 g/dL    RDW 17.4 (H) 11.6 - 14.5 %    PLATELET 933 069 - 109 K/uL    MPV 10.4 9.2 - 11.8 FL    NEUTROPHILS 49 40 - 73 %    LYMPHOCYTES 38 21 - 52 %    MONOCYTES 10 3 - 10 %    EOSINOPHILS 2 0 - 5 %    BASOPHILS 1 0 - 2 %    ABS. NEUTROPHILS 3.5 1.8 - 8.0 K/UL    ABS. LYMPHOCYTES 2.7 0.9 - 3.6 K/UL    ABS. MONOCYTES 0.7 0.05 - 1.2 K/UL    ABS. EOSINOPHILS 0.1 0.0 - 0.4 K/UL    ABS.  BASOPHILS 0.1 0.0 - 0.1 K/UL    DF AUTOMATED     URINALYSIS W/ RFLX MICROSCOPIC    Collection Time: 06/18/21  5:21 PM   Result Value Ref Range    Color DARK YELLOW      Appearance TURBID      Specific gravity 1.021 1.005 - 1.030      pH (UA) 8.5 (H) 5.0 - 8.0      Protein >1,000 (A) NEG mg/dL    Glucose Negative NEG mg/dL    Ketone Negative NEG mg/dL    Bilirubin Negative NEG      Blood LARGE (A) NEG      Urobilinogen 0.2 0.2 - 1.0 EU/dL    Nitrites Negative NEG      Leukocyte Esterase LARGE (A) NEG     URINE MICROSCOPIC ONLY    Collection Time: 06/18/21  5:21 PM   Result Value Ref Range    WBC TOO NUMEROUS TO COUNT 0 - 4 /hpf    RBC 4 to 5 0 - 5 /hpf    Epithelial cells 1+ 0 - 5 /lpf    Bacteria 1+ (A) NEG /hpf   POC LACTIC ACID    Collection Time: 06/18/21  5:35 PM   Result Value Ref Range    Lactic Acid (POC) 3.47 (HH) 0.40 - 2.00 mmol/L   EKG, 12 LEAD, INITIAL    Collection Time: 06/18/21  5:54 PM   Result Value Ref Range    Ventricular Rate 99 BPM    Atrial Rate 93 BPM    QRS Duration 86 ms    Q-T Interval 360 ms    QTC Calculation (Bezet) 462 ms    Calculated R Axis 90 degrees    Calculated T Axis 38 degrees    Diagnosis       Atrial fibrillation  Anterolateral infarct (cited on or before 16-FEB-2021)  Abnormal ECG  When compared with ECG of 16-FEB-2021 13:03,  Atrial fibrillation has replaced Sinus rhythm  Questionable change in QRS axis  Nonspecific T wave abnormality now evident in Inferior leads     POC LACTIC ACID    Collection Time: 06/18/21 10:58 PM   Result Value Ref Range    Lactic Acid (POC) 1.37 0.40 - 2.00 mmol/L       ECG:   Atrial fibrillation   Anterolateral infarct (cited on or before 16-FEB-2021)   Abnormal ECG   When compared with ECG of 16-FEB-2021 13:03,   Atrial fibrillation has replaced Sinus rhythm   Questionable change in QRS axis   Nonspecific T wave abnormality now evident in Inferior leads     CT ABD/PElvis  Right ureteral stent which is well-positioned.  No evidence to suggest  obstruction or complication.       Assessment:  Active Problems:    Anemia of chronic renal failure (3/19/2020)      Hypotension (5/15/2020)      UTI (urinary tract infection) (5/15/2020)      CKD (chronic kidney disease) stage 5, GFR less than 15 ml/min ([facility] [address] (5/17/2020)      Acquired hypothyroidism (5/17/2020)      Sepsis ([facility] [address] (6/18/2021)      Tachycardia (6/18/2021)        Plan:  Notify nephrology patient in hospital and arrange for dialysis MWF  PRN small bolus of fluids for hypotension--to maintain MAPS>65  Avoid nephrotoxic medications  Empiric cefipime for antibiotic coverage  Continue home synthroid and home midodrine    GLOBAL:  Admit to: telemetry  Cardiac Diet  DVT PPX: SQH q12hr  DNR  PT/OT  Tylenol/NSAID for pain  Optional Inpatient Sleep Regimen  Anticipate DC 1-2 days    Signed:  Emory Mcdonald MD 6/18/2021

## 2021-06-20 LAB
EST. AVERAGE GLUCOSE BLD GHB EST-MCNC: 85 MG/DL
GLUCOSE BLD STRIP.AUTO-MCNC: 102 MG/DL (ref 70–110)
GLUCOSE BLD STRIP.AUTO-MCNC: 112 MG/DL (ref 70–110)
HBA1C MFR BLD: 4.6 % (ref 4.2–5.6)

## 2021-06-20 PROCEDURE — 74011250637 HC RX REV CODE- 250/637: Performed by: INTERNAL MEDICINE

## 2021-06-20 PROCEDURE — 74011000250 HC RX REV CODE- 250: Performed by: INTERNAL MEDICINE

## 2021-06-20 PROCEDURE — 99232 SBSQ HOSP IP/OBS MODERATE 35: CPT | Performed by: NURSE PRACTITIONER

## 2021-06-20 PROCEDURE — 74011250636 HC RX REV CODE- 250/636: Performed by: INTERNAL MEDICINE

## 2021-06-20 PROCEDURE — 65660000004 HC RM CVT STEPDOWN

## 2021-06-20 PROCEDURE — 82962 GLUCOSE BLOOD TEST: CPT

## 2021-06-20 PROCEDURE — 96376 TX/PRO/DX INJ SAME DRUG ADON: CPT

## 2021-06-20 PROCEDURE — 83036 HEMOGLOBIN GLYCOSYLATED A1C: CPT

## 2021-06-20 PROCEDURE — 36415 COLL VENOUS BLD VENIPUNCTURE: CPT

## 2021-06-20 PROCEDURE — 2709999900 HC NON-CHARGEABLE SUPPLY

## 2021-06-20 PROCEDURE — 82180 ASSAY OF ASCORBIC ACID: CPT

## 2021-06-20 PROCEDURE — 96372 THER/PROPH/DIAG INJ SC/IM: CPT

## 2021-06-20 PROCEDURE — APPSS30 APP SPLIT SHARED TIME 16-30 MINUTES: Performed by: NURSE PRACTITIONER

## 2021-06-20 PROCEDURE — 74011000258 HC RX REV CODE- 258: Performed by: INTERNAL MEDICINE

## 2021-06-20 PROCEDURE — 99218 HC RM OBSERVATION: CPT

## 2021-06-20 PROCEDURE — 96375 TX/PRO/DX INJ NEW DRUG ADDON: CPT

## 2021-06-20 RX ORDER — MAGNESIUM SULFATE 100 %
4 CRYSTALS MISCELLANEOUS AS NEEDED
Status: DISCONTINUED | OUTPATIENT
Start: 2021-06-20 | End: 2021-06-21 | Stop reason: HOSPADM

## 2021-06-20 RX ORDER — SODIUM CHLORIDE 9 MG/ML
250 INJECTION, SOLUTION INTRAVENOUS CONTINUOUS
Status: DISCONTINUED | OUTPATIENT
Start: 2021-06-20 | End: 2021-06-20

## 2021-06-20 RX ORDER — DEXTROSE 50 % IN WATER (D50W) INTRAVENOUS SYRINGE
25-50 AS NEEDED
Status: DISCONTINUED | OUTPATIENT
Start: 2021-06-20 | End: 2021-06-21 | Stop reason: HOSPADM

## 2021-06-20 RX ORDER — INSULIN LISPRO 100 [IU]/ML
INJECTION, SOLUTION INTRAVENOUS; SUBCUTANEOUS
Status: DISCONTINUED | OUTPATIENT
Start: 2021-06-20 | End: 2021-06-21 | Stop reason: HOSPADM

## 2021-06-20 RX ORDER — SODIUM CHLORIDE 9 MG/ML
250 INJECTION, SOLUTION INTRAVENOUS ONCE
Status: COMPLETED | OUTPATIENT
Start: 2021-06-20 | End: 2021-06-20

## 2021-06-20 RX ORDER — HYDROCORTISONE SODIUM SUCCINATE 100 MG/2ML
50 INJECTION, POWDER, FOR SOLUTION INTRAMUSCULAR; INTRAVENOUS ONCE
Status: COMPLETED | OUTPATIENT
Start: 2021-06-20 | End: 2021-06-20

## 2021-06-20 RX ADMIN — FLUDROCORTISONE ACETATE 0.1 MG: 0.1 TABLET ORAL at 09:23

## 2021-06-20 RX ADMIN — THIAMINE HYDROCHLORIDE 100 MG: 100 INJECTION, SOLUTION INTRAMUSCULAR; INTRAVENOUS at 09:00

## 2021-06-20 RX ADMIN — MIDODRINE HYDROCHLORIDE 10 MG: 5 TABLET ORAL at 16:24

## 2021-06-20 RX ADMIN — HEPARIN SODIUM 5000 UNITS: 5000 INJECTION INTRAVENOUS; SUBCUTANEOUS at 13:15

## 2021-06-20 RX ADMIN — HYDROCORTISONE SODIUM SUCCINATE 50 MG: 100 INJECTION, POWDER, FOR SOLUTION INTRAMUSCULAR; INTRAVENOUS at 02:22

## 2021-06-20 RX ADMIN — MIDODRINE HYDROCHLORIDE 10 MG: 5 TABLET ORAL at 21:00

## 2021-06-20 RX ADMIN — CEFEPIME HYDROCHLORIDE 2 G: 2 INJECTION, POWDER, FOR SOLUTION INTRAVENOUS at 16:57

## 2021-06-20 RX ADMIN — MIDODRINE HYDROCHLORIDE 10 MG: 5 TABLET ORAL at 09:23

## 2021-06-20 RX ADMIN — Medication 10 ML: at 13:18

## 2021-06-20 RX ADMIN — SODIUM CHLORIDE 250 ML/HR: 900 INJECTION, SOLUTION INTRAVENOUS at 03:59

## 2021-06-20 RX ADMIN — Medication 10 ML: at 22:04

## 2021-06-20 RX ADMIN — Medication 500 MG: at 09:23

## 2021-06-20 RX ADMIN — ASCORBIC ACID 1.5 G: 500 INJECTION INTRAVENOUS at 02:22

## 2021-06-20 RX ADMIN — HEPARIN SODIUM 5000 UNITS: 5000 INJECTION INTRAVENOUS; SUBCUTANEOUS at 22:03

## 2021-06-20 RX ADMIN — PANTOPRAZOLE SODIUM 20 MG: 20 TABLET, DELAYED RELEASE ORAL at 22:03

## 2021-06-20 RX ADMIN — LEVOTHYROXINE SODIUM 125 MCG: 25 TABLET ORAL at 06:24

## 2021-06-20 RX ADMIN — SODIUM CHLORIDE 250 ML/HR: 900 INJECTION, SOLUTION INTRAVENOUS at 01:45

## 2021-06-20 RX ADMIN — Medication 10 ML: at 06:24

## 2021-06-20 RX ADMIN — TAMSULOSIN HYDROCHLORIDE 0.4 MG: 0.4 CAPSULE ORAL at 09:23

## 2021-06-20 RX ADMIN — HEPARIN SODIUM 5000 UNITS: 5000 INJECTION INTRAVENOUS; SUBCUTANEOUS at 06:24

## 2021-06-20 NOTE — PROGRESS NOTES
Subjective  Notified by nursing regarding worsening hypotension. Refractory to midodrine 5mg. Asymptomatic otherwise    Objective  Visit Vitals  BP (!) 80/40   Pulse 95   Temp 97.6 °F (36.4 °C)   Resp 22   SpO2 99%          Assessment:  Hypotension  ESRD on dialysis MWF    Plan:  Gave 1x dose of midodrine 10mg  Started on 10mg midodrine TID  Trial of 250ml bolus NS  Will also trial on 50mg hydrocortisone, 1.5gm ascorbic acid, 100mg thiamine  Checking Vit C levels    Ramirez Chin MD  Nocturnist  [facility]  [phone]

## 2021-06-20 NOTE — PROGRESS NOTES
RENAL DAILY PROGRESS NOTE    Patient: Olinda Flaherty               Sex: female          DOA: 6/18/2021  4:51 PM        YOB: 1945      Age:  66 y.o.        LOS:  LOS: 2 days     Subjective:     Olinda Flaherty is a 66 y.o.  who presents with UTI (urinary tract infection) [N39.0]  Sepsis ([facility] [address] [A41.9]. Asked to evaluate for esrd,admitted with uti,hypotension. hx of reccurent uti,right ureteral stent changed in 3/2021.has chronic hypotension on midodrine pre dialysis  Chief complains: Patient denies nausea, vomiting, chest pain, dizziness, shortness of breath or headache.  - Reviewed last 24 hrs events     Current Facility-Administered Medications   Medication Dose Route Frequency    thiamine (B-1) 100 mg in 0.9% sodium chloride 50 mL IVPB  100 mg IntraVENous DAILY    insulin lispro (HUMALOG) injection   SubCUTAneous AC&HS    glucose chewable tablet 16 g  4 Tablet Oral PRN    glucagon (GLUCAGEN) injection 1 mg  1 mg IntraMUSCular PRN    dextrose (D50W) injection syrg 12.5-25 g  25-50 mL IntraVENous PRN    ascorbic acid (vitamin C) (VITAMIN C) tablet 500 mg  500 mg Oral DAILY    fludrocortisone (FLORINEF) tablet 0.1 mg  0.1 mg Oral DAILY    levothyroxine (SYNTHROID) tablet 125 mcg  125 mcg Oral ACB    pantoprazole (PROTONIX) tablet 20 mg  20 mg Oral QHS    tamsulosin (FLOMAX) capsule 0.4 mg  0.4 mg Oral DAILY    sodium chloride (NS) flush 5-40 mL  5-40 mL IntraVENous Q8H    sodium chloride (NS) flush 5-40 mL  5-40 mL IntraVENous PRN    acetaminophen (TYLENOL) tablet 650 mg  650 mg Oral Q6H PRN    Or    acetaminophen (TYLENOL) suppository 650 mg  650 mg Rectal Q6H PRN    polyethylene glycol (MIRALAX) packet 17 g  17 g Oral DAILY PRN    ondansetron (ZOFRAN ODT) tablet 4 mg  4 mg Oral Q8H PRN    Or    ondansetron (ZOFRAN) injection 4 mg  4 mg IntraVENous Q6H PRN    heparin (porcine) injection 5,000 Units  5,000 Units SubCUTAneous Q8H    midodrine (PROAMATINE) tablet 10 mg  10 mg Oral TID    sodium chloride (NS) flush 5-10 mL  5-10 mL IntraVENous PRN    cefepime (MAXIPIME) 2 g in sterile water (preservative free) 10 mL IV syringe  2 g IntraVENous Q24H       Objective:     Visit Vitals  /65   Pulse 79   Temp 97.6 °F (36.4 °C)   Resp 19   Ht 5' 2\" (1.575 m)   Wt 97.7 kg (215 lb 4.8 oz)   SpO2 99%   BMI 39.38 kg/m²       Intake/Output Summary (Last 24 hours) at 6/20/2021 1358  Last data filed at 6/20/2021 0900  Gross per 24 hour   Intake 50 ml   Output --   Net 50 ml       Physical Examination:     GEN: AAO X 3, NAD  RS: Chest is bilateral equal, no wheezing / rales / crackles  CVS: S1-S2 heard,  Abdomen: Soft, Non tender, Not distended, Positive bowel sounds, no organomegaly, no CVA / supra pubic tenderness  Extremities: No edema, no cyanosis, skin is warm on touch  CNS: Awake & follows commands,   HEENT: Head is atraumatic, PERRLA, conjunctiva pink & non icteric. No JVD or carotid bruit     Data Review:      Labs:     Hematology:   Recent Labs     06/18/21  1720   WBC 7.1   HGB 12.1   HCT 38.1     Chemistry:   Recent Labs     06/18/21  1720   BUN 19*   CREA 4.58*   CA 9.2   ALB 3.7   K 4.2      CL 97*   CO2 34*   *        Images:    XR (Most Recent). CXR reviewed by me and compared with previous CXR Results from Hospital Encounter encounter on 06/18/21    XR CHEST PORT    Narrative  CHEST PORTABLE 1718 hours    COMPARISON: None. INDICATIONS: SIRS criteria. FINDINGS:    Portable single view chest demonstrates:    Lungs: Clear. Cardiac Silhouette And Mediastinal Contours: The aorta is tortuous. Otherwise  unremarkable. Pleural Spaces: No pneumothorax or pleural effusion evident. Bones And Soft Tissues: Unremarkable for age. Impression  No active or acute cardiopulmonary process is evident.        CT (Most Recent) Results from Hospital Encounter encounter on 06/18/21    CT ABD PELV WO CONT    Narrative  EXAM: CT of the Abdomen and Pelvis without IV contrast    CLINICAL INDICATION:  Evaluate for hydro/worsening stricture . Flank pain. Patient on dialysis. TECHNIQUE: CT of the abdomen and pelvis. Sagittal and coronal reformations    All CT scans at this facility are performed using dose optimization technique as  appropriate to a performed exam, to include automated exposure control,  adjustment of the mA and/or kV according to patient size (including appropriate  matching for site specific examination) or use of iterative reconstruction  technique. IV CONTRAST: None    ENTERIC CONTRAST: None    COMPARISON: Retrograde pyelogram dated 3/2/2021    FINDINGS:  Limitations: The evaluation of the solid organs is limited due to the lack of IV  contrast.    Lower Chest: No acute infiltrate or effusion appreciated. The heart and  pericardium are unremarkable. Peritoneum: No free air appreciated. No free fluid present. No fluid collections  present. Liver: No focal lesion appreciated. Biliary/Gallbladder: The biliary tree is mildly prominent. This is likely  related to postcholecystectomy status. Neck are surgically absent. Spleen: Splenomegaly is present. Pancreas: No focal lesion appreciated. The pancreatic duct is unremarkable. No  peripancreatic inflammation or adenopathy. : The adrenal glands are unremarkable. The kidneys are atrophic. The right  kidney as diminutive exophytic hypodensities which likely represent cysts. A  prominent right extrarenal pelvis is noted. A ureteral stent is noted within the  renal pelvis and ureter with the distal end within the bladder. No evidence to  suggest obstruction. No urolithiasis appreciated along the course of the ureter  or stent. The left kidney is atrophic with multiple likely cyst. The left  collecting system is no evidence of hydroureteronephrosis. No acute pathology in  the bladder. The uterus and adnexa are unremarkable. GI: Postoperative changes are noted in the stomach.  The small bowel is without  evidence of obstruction. No small bowel wall thickening. The mesentery is  without inflammation or adenopathy. The appendix is unremarkable. A few  diverticula are noted. Aorta and retroperitoneum: The abdominal aorta is mildly tortuous. No periaortic  adenopathy seen. No fluid collections in the retroperitoneum appreciated. Abdominal wall/Inguinal: Mild subcutaneous edema is noted. Musculoskeletal: Multilevel degenerative disc disease and facet arthropathy are  noted. Bilateral hip osteoarthritis is noted. Impression  Right ureteral stent which is well-positioned. No evidence to suggest  obstruction or complication. Atrophic kidneys with bilateral cysts. Mild splenomegaly. EKG No results found for this or any previous visit. I have personally reviewed the old medical records and patient's labs    Plan / Recommendation:      1.  Esrd,on dialysis mon wed Friday  2.chronic hypotension,events noted,midodrine increased  3.reccurent uti,continue current antibiotics,cultures g neg rods,stent r ureter,repeat ultrasound    D/w Dr. Brittany Conway MD  Nephrology  6/20/2021

## 2021-06-20 NOTE — PROGRESS NOTES
Problem: Risk for Spread of Infection  Goal: Prevent transmission of infectious organism to others  Description: Prevent the transmission of infectious organisms to other patients, staff members, and visitors. Outcome: Progressing Towards Goal     Problem: Patient Education:  Go to Education Activity  Goal: Patient/Family Education  Outcome: Progressing Towards Goal     Problem: Falls - Risk of  Goal: *Absence of Falls  Description: Document Boubacar Ramirez Fall Risk and appropriate interventions in the flowsheet. Outcome: Progressing Towards Goal  Note: Fall Risk Interventions:  Mobility Interventions: Bed/chair exit alarm         Medication Interventions: Assess postural VS orthostatic hypotension, Patient to call before getting OOB    Elimination Interventions: Bed/chair exit alarm, Call light in reach              Problem: Patient Education: Go to Patient Education Activity  Goal: Patient/Family Education  Outcome: Progressing Towards Goal     Problem: Pressure Injury - Risk of  Goal: *Prevention of pressure injury  Description: Document Giovany Scale and appropriate interventions in the flowsheet.   Outcome: Progressing Towards Goal  Note: Pressure Injury Interventions:  Sensory Interventions: Assess changes in LOC         Activity Interventions: Pressure redistribution bed/mattress(bed type), PT/OT evaluation    Mobility Interventions: Pressure redistribution bed/mattress (bed type), PT/OT evaluation    Nutrition Interventions: Document food/fluid/supplement intake    Friction and Shear Interventions: HOB 30 degrees or less                Problem: Patient Education: Go to Patient Education Activity  Goal: Patient/Family Education  Outcome: Progressing Towards Goal

## 2021-06-20 NOTE — ROUTINE PROCESS
1900: Bedside shift change report given to Rajinder Sanchez RN (oncoming nurse) by Jarett Chappell (offgoing nurse). Report included the following information SBAR, Kardex, ED Summary and Cardiac Rhythm NSR.    2240: Paged Hospitalist. Spoke with Dr. Nathan Small regarding patient's home medication and low BP. MD stated to give patient 100mg of gabapentin and 10mg of midodrine TID. MD stated if needed to call back for bolus of 250cc    0128: Paged Hospitalist. Notifying BP of 85/43. Stated to give 250cc bolus. 0345: Paged Hospitalist regarding patient's BP. Instructed to bolus patient additional 250cc.     0700: Bedside shift change report given to Elaine Appiah RN (oncoming nurse) by Chico Beltran (offgoing nurse). Report included the following information SBAR, Kardex, ED Summary and Cardiac Rhythm NSR.

## 2021-06-20 NOTE — PROGRESS NOTES
Progress Note    Patient: Anne Garcia MRN: [id number]  CSN: [id number]    YOB: 1945  Age: 66 y.o. Sex: female    DOA: 6/18/2021 LOS:  LOS: 2 days               Subjective:     Pt transferred to stepdown as hypotension persisted. RN reported inconsistent readings once BP cuff placed on lower extremity so appropriate size cuff was placed to right forearm. Midodrine increased to 10 mg TID and also received NS fluid bolus overnight. Pt denies lightheadedness or dizziness, states she has a hx of low BP. No c/o SOB, chest pain. Still has some right flank discomfort. Chief Complaint:   Chief Complaint   Patient presents with    Nausea    Vomiting         Assessment/Plan       Assessment  1. Sepsis, POA w/ tachycardia, lactic acidosis, hypotension  2. Hypotension, chronic- with hx of HTN  3. N/V  4. UTI w/ hematuria  5. Right flank pain  6. ESRD on HD  7. Acquired hypothyroidism  8. T2DM, diet controlled  9. Hyperlipidemia  10. Hx right ureteral stricture managed with JJ stent          Plan  1. Continue IV Cefepime. CT a/p with well positioned right ureteral stent- pt states follows w/ urology and this was just exchanged in March 2021. Follow blood cultures collected 6/18 NGTD x 2. UA w/ large blood, large leukocyte esterase, TNTC WBCs, 1+ bacteria. Urine culture 80,000 colonies, culture in progress. 2. Midodrine 10 mg TID, s/p NS bolus. Trial of Hydrocortisone, thiamine and ascorbic acid. Pt on home dose Fludrocortisone 0.1 mg daily. 3. Nephrology following, appreciate assistance. Renally dose medications. HD per renal.  4. Monitor accuchecks, correctional SSI. Check A1C  5. Monitor vital signs, weight, I/Os per unit protocol  6. Continue home meds  7. Fall precautions  8. PT, OT following- no recommendations at this time  9. CM following to assist w/ dc planning      Diet: Renal  Code status: DNR    Pt has elected to update her family on hospitalization.     Case discussed with: [x]Patient  []Family  [x]Nursing  [x]Case Management  DVT Prophylaxis:  [x]Lovenox  []Hep SQ  []SCDs  []Coumadin   []On Heparin gtt      YAIMA Urbina  [facility]  pager [phone]      Objective:     Physical Exam:  Visit Vitals  BP (!) 103/52 (BP 1 Location: Right lower arm, BP Patient Position: At rest)   Pulse 80   Temp 98 °F (36.7 °C)   Resp 19   Ht 5' 2\" (1.575 m)   Wt 97.7 kg (215 lb 4.8 oz)   SpO2 98%   BMI 39.38 kg/m²        General:         Alert, cooperative, no acute distress    HEENT: NC, Atraumatic. PERRLA, anicteric sclerae. Lungs: CTA Bilaterally. No Wheezing/Rhonchi/Rales. Heart:  Regular  rhythm,  No murmur, No Rubs, No Gallops  Abdomen: Soft, Non distended, Non tender. +Bowel sounds, no HSM  Extremities: No c/c/e. AV fistula LUE +thrill, bruit. Psych:   Good insight. Not anxious or agitated. Neurologic:   Alert and oriented X 3. No acute neurological deficits      Intake and Output:  Current Shift:  No intake/output data recorded. Last three shifts:  No intake/output data recorded. Labs: Results:       Chemistry Recent Labs     06/18/21  1720   *      K 4.2   CL 97*   CO2 34*   BUN 19*   CREA 4.58*   CA 9.2   AGAP 7   BUCR 4*   *   TP 8.6*   ALB 3.7   GLOB 4.9*   AGRAT 0.8      CBC w/Diff Recent Labs     06/18/21  1720   WBC 7.1   RBC 3.89*   HGB 12.1   HCT 38.1      GRANS 49   LYMPH 38   EOS 2      Cardiac Enzymes No results for input(s): CPK, CKND1, PATTI in the last 72 hours. No lab exists for component: CKRMB, TROIP   Coagulation No results for input(s): PTP, INR, APTT, INREXT, INREXT in the last 72 hours. Lipid Panel No results found for: CHOL, CHOLPOCT, CHOLX, CHLST, CHOLV, 098636, HDL, HDLP, LDL, LDLC, DLDLP, 641800, VLDLC, VLDL, TGLX, TRIGL, TRIGP, TGLPOCT, CHHD, CHHDX   BNP No results for input(s): BNPP in the last 72 hours.    Liver Enzymes Recent Labs     06/18/21  1720   TP 8.6*   ALB 3.7   *      Thyroid Studies No results found for: T4, T3U, TSH, TSHEXT, TSHEXT       Procedures/imaging: see electronic medical records for all procedures/Xrays and details which were not copied into this note but were reviewed prior to creation of Plan    Medications:   Current Facility-Administered Medications   Medication Dose Route Frequency    thiamine (B-1) 100 mg in 0.9% sodium chloride 50 mL IVPB  100 mg IntraVENous DAILY    ascorbic acid (vitamin C) (VITAMIN C) tablet 500 mg  500 mg Oral DAILY    fludrocortisone (FLORINEF) tablet 0.1 mg  0.1 mg Oral DAILY    levothyroxine (SYNTHROID) tablet 125 mcg  125 mcg Oral ACB    pantoprazole (PROTONIX) tablet 20 mg  20 mg Oral QHS    tamsulosin (FLOMAX) capsule 0.4 mg  0.4 mg Oral DAILY    sodium chloride (NS) flush 5-40 mL  5-40 mL IntraVENous Q8H    sodium chloride (NS) flush 5-40 mL  5-40 mL IntraVENous PRN    acetaminophen (TYLENOL) tablet 650 mg  650 mg Oral Q6H PRN    Or    acetaminophen (TYLENOL) suppository 650 mg  650 mg Rectal Q6H PRN    polyethylene glycol (MIRALAX) packet 17 g  17 g Oral DAILY PRN    ondansetron (ZOFRAN ODT) tablet 4 mg  4 mg Oral Q8H PRN    Or    ondansetron (ZOFRAN) injection 4 mg  4 mg IntraVENous Q6H PRN    heparin (porcine) injection 5,000 Units  5,000 Units SubCUTAneous Q8H    midodrine (PROAMATINE) tablet 10 mg  10 mg Oral TID    sodium chloride (NS) flush 5-10 mL  5-10 mL IntraVENous PRN    cefepime (MAXIPIME) 2 g in sterile water (preservative free) 10 mL IV syringe  2 g IntraVENous Q24H

## 2021-06-21 ENCOUNTER — HOME HEALTH ADMISSION (OUTPATIENT)
Dept: HOME HEALTH SERVICES | Facility: HOME HEALTH | Age: 76
End: 2021-06-21

## 2021-06-21 ENCOUNTER — APPOINTMENT (OUTPATIENT)
Dept: ULTRASOUND IMAGING | Age: 76
End: 2021-06-21
Attending: INTERNAL MEDICINE
Payer: MEDICARE

## 2021-06-21 VITALS
DIASTOLIC BLOOD PRESSURE: 45 MMHG | OXYGEN SATURATION: 100 % | HEART RATE: 73 BPM | RESPIRATION RATE: 20 BRPM | HEIGHT: 62 IN | SYSTOLIC BLOOD PRESSURE: 102 MMHG | WEIGHT: 222 LBS | TEMPERATURE: 97.6 F | BODY MASS INDEX: 40.85 KG/M2

## 2021-06-21 LAB
ANION GAP SERPL CALC-SCNC: 11 MMOL/L (ref 3–18)
BACTERIA SPEC CULT: ABNORMAL
BASOPHILS # BLD: 0 K/UL (ref 0–0.1)
BASOPHILS NFR BLD: 1 % (ref 0–2)
BUN SERPL-MCNC: 47 MG/DL (ref 7–18)
BUN/CREAT SERPL: 6 (ref 12–20)
CALCIUM SERPL-MCNC: 8 MG/DL (ref 8.5–10.1)
CC UR VC: ABNORMAL
CHLORIDE SERPL-SCNC: 104 MMOL/L (ref 100–111)
CO2 SERPL-SCNC: 25 MMOL/L (ref 21–32)
CREAT SERPL-MCNC: 8.47 MG/DL (ref 0.6–1.3)
DIFFERENTIAL METHOD BLD: ABNORMAL
EOSINOPHIL # BLD: 0.2 K/UL (ref 0–0.4)
EOSINOPHIL NFR BLD: 4 % (ref 0–5)
ERYTHROCYTE [DISTWIDTH] IN BLOOD BY AUTOMATED COUNT: 17.2 % (ref 11.6–14.5)
GLUCOSE BLD STRIP.AUTO-MCNC: 114 MG/DL (ref 70–110)
GLUCOSE BLD STRIP.AUTO-MCNC: 78 MG/DL (ref 70–110)
GLUCOSE SERPL-MCNC: 115 MG/DL (ref 74–99)
HCT VFR BLD AUTO: 30.4 % (ref 35–45)
HGB BLD-MCNC: 9.5 G/DL (ref 12–16)
LYMPHOCYTES # BLD: 2.2 K/UL (ref 0.9–3.6)
LYMPHOCYTES NFR BLD: 45 % (ref 21–52)
MAGNESIUM SERPL-MCNC: 2.3 MG/DL (ref 1.6–2.6)
MCH RBC QN AUTO: 31.3 PG (ref 24–34)
MCHC RBC AUTO-ENTMCNC: 31.3 G/DL (ref 31–37)
MCV RBC AUTO: 100 FL (ref 74–97)
MONOCYTES # BLD: 0.4 K/UL (ref 0.05–1.2)
MONOCYTES NFR BLD: 8 % (ref 3–10)
NEUTS SEG # BLD: 2 K/UL (ref 1.8–8)
NEUTS SEG NFR BLD: 42 % (ref 40–73)
PHOSPHATE SERPL-MCNC: 6.1 MG/DL (ref 2.5–4.9)
PLATELET # BLD AUTO: 151 K/UL (ref 135–420)
PMV BLD AUTO: 10.8 FL (ref 9.2–11.8)
POTASSIUM SERPL-SCNC: 4.1 MMOL/L (ref 3.5–5.5)
RBC # BLD AUTO: 3.04 M/UL (ref 4.2–5.3)
SERVICE CMNT-IMP: ABNORMAL
SODIUM SERPL-SCNC: 140 MMOL/L (ref 136–145)
WBC # BLD AUTO: 4.8 K/UL (ref 4.6–13.2)

## 2021-06-21 PROCEDURE — 96372 THER/PROPH/DIAG INJ SC/IM: CPT

## 2021-06-21 PROCEDURE — 99218 HC RM OBSERVATION: CPT

## 2021-06-21 PROCEDURE — 99238 HOSP IP/OBS DSCHRG MGMT 30/<: CPT | Performed by: FAMILY MEDICINE

## 2021-06-21 PROCEDURE — 76770 US EXAM ABDO BACK WALL COMP: CPT

## 2021-06-21 PROCEDURE — 85025 COMPLETE CBC W/AUTO DIFF WBC: CPT

## 2021-06-21 PROCEDURE — 82962 GLUCOSE BLOOD TEST: CPT

## 2021-06-21 PROCEDURE — 74011250636 HC RX REV CODE- 250/636: Performed by: INTERNAL MEDICINE

## 2021-06-21 PROCEDURE — 36415 COLL VENOUS BLD VENIPUNCTURE: CPT

## 2021-06-21 PROCEDURE — 74011250637 HC RX REV CODE- 250/637: Performed by: INTERNAL MEDICINE

## 2021-06-21 PROCEDURE — 80048 BASIC METABOLIC PNL TOTAL CA: CPT

## 2021-06-21 PROCEDURE — APPSS60 APP SPLIT SHARED TIME 46-60 MINUTES: Performed by: NURSE PRACTITIONER

## 2021-06-21 PROCEDURE — 74011000258 HC RX REV CODE- 258: Performed by: INTERNAL MEDICINE

## 2021-06-21 PROCEDURE — 84100 ASSAY OF PHOSPHORUS: CPT

## 2021-06-21 PROCEDURE — 90935 HEMODIALYSIS ONE EVALUATION: CPT

## 2021-06-21 PROCEDURE — 2709999900 HC NON-CHARGEABLE SUPPLY

## 2021-06-21 PROCEDURE — G0257 UNSCHED DIALYSIS ESRD PT HOS: HCPCS

## 2021-06-21 PROCEDURE — 83735 ASSAY OF MAGNESIUM: CPT

## 2021-06-21 RX ORDER — LEVOFLOXACIN 750 MG/1
750 TABLET ORAL
Qty: 3 TABLET | Refills: 0 | Status: SHIPPED | OUTPATIENT
Start: 2021-06-21 | End: 2021-06-26

## 2021-06-21 RX ORDER — MIDODRINE HYDROCHLORIDE 10 MG/1
10 TABLET ORAL 3 TIMES DAILY
Qty: 90 TABLET | Refills: 0 | Status: SHIPPED | OUTPATIENT
Start: 2021-06-21 | End: 2021-07-21

## 2021-06-21 RX ORDER — DOCUSATE SODIUM 100 MG/1
100 CAPSULE, LIQUID FILLED ORAL DAILY
Status: DISCONTINUED | OUTPATIENT
Start: 2021-06-21 | End: 2021-06-21 | Stop reason: HOSPADM

## 2021-06-21 RX ADMIN — Medication 500 MG: at 08:19

## 2021-06-21 RX ADMIN — ACETAMINOPHEN 650 MG: 325 TABLET ORAL at 04:40

## 2021-06-21 RX ADMIN — MIDODRINE HYDROCHLORIDE 10 MG: 5 TABLET ORAL at 08:19

## 2021-06-21 RX ADMIN — FLUDROCORTISONE ACETATE 0.1 MG: 0.1 TABLET ORAL at 08:19

## 2021-06-21 RX ADMIN — Medication 10 ML: at 05:59

## 2021-06-21 RX ADMIN — TAMSULOSIN HYDROCHLORIDE 0.4 MG: 0.4 CAPSULE ORAL at 08:18

## 2021-06-21 RX ADMIN — MIDODRINE HYDROCHLORIDE 10 MG: 5 TABLET ORAL at 16:01

## 2021-06-21 RX ADMIN — HEPARIN SODIUM 5000 UNITS: 5000 INJECTION INTRAVENOUS; SUBCUTANEOUS at 05:59

## 2021-06-21 RX ADMIN — THIAMINE HYDROCHLORIDE 100 MG: 100 INJECTION, SOLUTION INTRAMUSCULAR; INTRAVENOUS at 08:19

## 2021-06-21 RX ADMIN — LEVOTHYROXINE SODIUM 125 MCG: 25 TABLET ORAL at 05:59

## 2021-06-21 NOTE — PROGRESS NOTES
RENAL DAILY PROGRESS NOTE    Patient: Bipin Hay               Sex: female          DOA: 6/18/2021  4:51 PM        YOB: 1945      Age:  66 y.o.        LOS:  LOS: 1 day     Subjective:     Bipin Hay is a 66 y.o.  who presents with UTI (urinary tract infection) [N39.0]  Sepsis ([facility] [address] [A41.9]. Asked to evaluate for esrd,admitted with uti,hypotension. hx of reccurent uti,right ureteral stent changed in 3/2021.has chronic hypotension on midodrine pre dialysis  Chief complains: Patient denies nausea, vomiting, chest pain, dizziness, shortness of breath or headache.  - Reviewed last 24 hrs events     Current Facility-Administered Medications   Medication Dose Route Frequency    docusate sodium (COLACE) capsule 100 mg  100 mg Oral DAILY    thiamine (B-1) 100 mg in 0.9% sodium chloride 50 mL IVPB  100 mg IntraVENous DAILY    insulin lispro (HUMALOG) injection   SubCUTAneous AC&HS    glucose chewable tablet 16 g  4 Tablet Oral PRN    glucagon (GLUCAGEN) injection 1 mg  1 mg IntraMUSCular PRN    dextrose (D50W) injection syrg 12.5-25 g  25-50 mL IntraVENous PRN    ascorbic acid (vitamin C) (VITAMIN C) tablet 500 mg  500 mg Oral DAILY    fludrocortisone (FLORINEF) tablet 0.1 mg  0.1 mg Oral DAILY    levothyroxine (SYNTHROID) tablet 125 mcg  125 mcg Oral ACB    pantoprazole (PROTONIX) tablet 20 mg  20 mg Oral QHS    tamsulosin (FLOMAX) capsule 0.4 mg  0.4 mg Oral DAILY    sodium chloride (NS) flush 5-40 mL  5-40 mL IntraVENous Q8H    sodium chloride (NS) flush 5-40 mL  5-40 mL IntraVENous PRN    acetaminophen (TYLENOL) tablet 650 mg  650 mg Oral Q6H PRN    Or    acetaminophen (TYLENOL) suppository 650 mg  650 mg Rectal Q6H PRN    polyethylene glycol (MIRALAX) packet 17 g  17 g Oral DAILY PRN    ondansetron (ZOFRAN ODT) tablet 4 mg  4 mg Oral Q8H PRN    Or    ondansetron (ZOFRAN) injection 4 mg  4 mg IntraVENous Q6H PRN    heparin (porcine) injection 5,000 Units  5,000 Units SubCUTAneous Q8H    midodrine (PROAMATINE) tablet 10 mg  10 mg Oral TID    sodium chloride (NS) flush 5-10 mL  5-10 mL IntraVENous PRN    cefepime (MAXIPIME) 2 g in sterile water (preservative free) 10 mL IV syringe  2 g IntraVENous Q24H       Objective:     Visit Vitals  BP (!) 97/51 (BP 1 Location: Right lower arm, BP Patient Position: At rest)   Pulse 97   Temp 97.9 °F (36.6 °C)   Resp 16   Ht 5' 2\" (1.575 m)   Wt 100.7 kg (222 lb)   SpO2 98%   BMI 40.60 kg/m²     No intake or output data in the 24 hours ending 06/21/21 1117    Physical Examination:     GEN: AAO X 3, NAD  RS: Chest is bilateral equal, no wheezing / rales / crackles  CVS: S1-S2 heard,  Abdomen: Soft, Non tender, Not distended, Positive bowel sounds, no organomegaly, no CVA / supra pubic tenderness  Extremities: No edema, no cyanosis, skin is warm on touch  CNS: Awake & follows commands,   HEENT: Head is atraumatic, PERRLA, conjunctiva pink & non icteric. No JVD or carotid bruit     Data Review:      Labs:     Hematology:   Recent Labs     06/21/21  0240 06/18/21  1720   WBC 4.8 7.1   HGB 9.5* 12.1   HCT 30.4* 38.1     Chemistry:   Recent Labs     06/21/21  0240 06/18/21  1720   BUN 47* 19*   CREA 8.47* 4.58*   CA 8.0* 9.2   ALB  --  3.7   K 4.1 4.2    138    97*   CO2 25 34*   PHOS 6.1*  --    * 117*        Images:    XR (Most Recent). CXR reviewed by me and compared with previous CXR Results from Hospital Encounter encounter on 06/18/21    XR CHEST PORT    Narrative  CHEST PORTABLE 1718 hours    COMPARISON: None. INDICATIONS: SIRS criteria. FINDINGS:    Portable single view chest demonstrates:    Lungs: Clear. Cardiac Silhouette And Mediastinal Contours: The aorta is tortuous. Otherwise  unremarkable. Pleural Spaces: No pneumothorax or pleural effusion evident. Bones And Soft Tissues: Unremarkable for age. Impression  No active or acute cardiopulmonary process is evident.        CT (Most Recent) Results from Hospital Encounter encounter on 06/18/21    CT ABD PELV WO CONT    Narrative  EXAM: CT of the Abdomen and Pelvis without IV contrast    CLINICAL INDICATION:  Evaluate for hydro/worsening stricture . Flank pain. Patient on dialysis. TECHNIQUE: CT of the abdomen and pelvis. Sagittal and coronal reformations    All CT scans at this facility are performed using dose optimization technique as  appropriate to a performed exam, to include automated exposure control,  adjustment of the mA and/or kV according to patient size (including appropriate  matching for site specific examination) or use of iterative reconstruction  technique. IV CONTRAST: None    ENTERIC CONTRAST: None    COMPARISON: Retrograde pyelogram dated 3/2/2021    FINDINGS:  Limitations: The evaluation of the solid organs is limited due to the lack of IV  contrast.    Lower Chest: No acute infiltrate or effusion appreciated. The heart and  pericardium are unremarkable. Peritoneum: No free air appreciated. No free fluid present. No fluid collections  present. Liver: No focal lesion appreciated. Biliary/Gallbladder: The biliary tree is mildly prominent. This is likely  related to postcholecystectomy status. Neck are surgically absent. Spleen: Splenomegaly is present. Pancreas: No focal lesion appreciated. The pancreatic duct is unremarkable. No  peripancreatic inflammation or adenopathy. : The adrenal glands are unremarkable. The kidneys are atrophic. The right  kidney as diminutive exophytic hypodensities which likely represent cysts. A  prominent right extrarenal pelvis is noted. A ureteral stent is noted within the  renal pelvis and ureter with the distal end within the bladder. No evidence to  suggest obstruction. No urolithiasis appreciated along the course of the ureter  or stent. The left kidney is atrophic with multiple likely cyst. The left  collecting system is no evidence of hydroureteronephrosis.  No acute pathology in  the bladder. The uterus and adnexa are unremarkable. GI: Postoperative changes are noted in the stomach. The small bowel is without  evidence of obstruction. No small bowel wall thickening. The mesentery is  without inflammation or adenopathy. The appendix is unremarkable. A few  diverticula are noted. Aorta and retroperitoneum: The abdominal aorta is mildly tortuous. No periaortic  adenopathy seen. No fluid collections in the retroperitoneum appreciated. Abdominal wall/Inguinal: Mild subcutaneous edema is noted. Musculoskeletal: Multilevel degenerative disc disease and facet arthropathy are  noted. Bilateral hip osteoarthritis is noted. Impression  Right ureteral stent which is well-positioned. No evidence to suggest  obstruction or complication. Atrophic kidneys with bilateral cysts. Mild splenomegaly. EKG No results found for this or any previous visit. I have personally reviewed the old medical records and patient's labs    Plan / Recommendation:      1. Esrd,on dialysis mon wed Friday. for dialysis today  2.chronic hypotension,discussed with op dialysis nurses. midodrine increased  3.reccurent uti,continue current antibiotics,cultures g neg rods,stent r ureter,repeat ultrasound    D/w Dr. Aguilar Miranda MD  Nephrology  6/21/2021

## 2021-06-21 NOTE — PROGRESS NOTES
Reason for Admission:  UTI (urinary tract infection) [N39.0]  Sepsis ([address] [A41.9]                 RUR Score:    27%            Plan for utilizing home health:    No, hospital to home visit ordered but doesn't have a qualifying diagnosis. Patient and son do not feel she needs home health. Likelihood of Readmission:   Moderate                         Do you (patient/family) have any concerns for transition/discharge?  no    Transition of Care Plan:       Initial assessment completed with patient and Dann flores. Cognitive status of patient: oriented to time, place, person and situation. Face sheet information confirmed:  yes. The patient designates Dann flores to participate in her discharge plan and to receive any needed information. This patient lives in a single family home with son and daughter in [place]. Patient is able to navigate steps as needed. Prior to hospitalization, patient was considered to be independent with ADLs/IADLS : yes . Patient has a current ACP document on file: yes      Healthcare Decision Maker:   Primary Decision Maker: Emilie Antoni - [phone]    Click here to complete [address] including selection of the Healthcare Decision Maker Relationship (ie \"Primary\")    The son will be available to transport patient home upon discharge. The patient already has Angel Mandes, and shower chair available in the home. Patient is not currently active with home health. Patient has not stayed in a skilled nursing facility or rehab. This patient is on dialysis :yes    If yes, hemodialysis patient and receives treatment on Monday/Wednesday/Friday at [facility] [phone]  Chair time is 5am. Pt is transported to/from dialysis by her son. Currently, the discharge plan is Home with possible hospital to home visit. The patient states that she can obtain her medications from the pharmacy, and take her medications as directed.     Patient's current insurance is [facility] Medicare. Care Management Interventions  PCP Verified by CM: Yes  Mode of Transport at Discharge: Self  Transition of Care Consult (CM Consult): Discharge Planning  Current Support Network:  Other (lives with son and DIL)  Confirm Follow Up Transport: Family  Discharge Location  Discharge Placement: Home        Nani Pinzon RN - Outcomes Manager  [phone]

## 2021-06-21 NOTE — DIALYSIS
DAT        ACUTE HEMODIALYSIS FLOW SHEET      HEMODIALYSIS ORDERS: Physician: jason     Dialyzer: revaclear   Duration: 3 hr  BFR: 300   DFR: 600   Dialysate:  Temp 36-37*C  K+   2    Ca+  2.5 Na 138 Bicarb 30   Weight: 40.6 kg   Patient Chart [x]     Unable to Obtain []   Dry weight/UF Goal: 1000 Access AVF  Needle Gauge 15    Heparin []  Bolus      Units    [] Hourly       Units    [x]None      Catheter locking solution    Pre BP:   103/59    Pulse:     73       Respirations: 16  Temperature:   97.7   Labs: Pre        Post:        [x] N/A   Additional Orders(medications, blood products, hypotension management) [x] N/A     [x] Dat Consent Verified       GRAFT/FISTULA ACCESS:  []N/A     []Right     [x]Left     [x]UE     []LE   []AVG   [x]AVF        []Buttonhole    [x]Medical Aseptic Prep Utilized   [x]No S/S infection  []Redness  []Drainage []Cultured []Swelling []Pain    Bruit:   [x] Strong    [] Weak       Thrill :   [x] Strong    [] Weak       Needle Gauge: 15  Length: 1   If access problem,  notified: []Yes     [x]N/A  Date:        Please describe access if present and not used:                            GENERAL ASSESSMENT:      LUNGS:  Rate  SaO2% [] N/A    [x] Clear  [] Coarse  [] Crackles  [] Wheezing        [] Diminished     Location : []RLL   []LLL    []RUL  []LALI     Cough: []Productive  []Dry  [x]N/A   Respirations:  [x]Easy  []Labored     Therapy:   [x]RA  []NC = l/min    Mask: []NRB []Venti       O2%                  []Ventilator  []Intubated  [] Trach  [] BiPaP     CARDIAC: [x]Regular      [] Irregular   [] Pericardial Rub  [] JVD        []  Monitored  [] Bedside  [] Remotely monitored [] N/A  Rhythm:      EDEMA: [] None  [x]Generalized  [] Pitting [] 1    [] 2    [] 3    [] 4                 [] Facial  [] Pedal  []  UE  [] LE     SKIN:   [x] Warm  [] Hot     [] Cold   [x] Dry     [] Pale   [] Diaphoretic                  [] Flushed  [] Jaundiced  [] Cyanotic  [] Rash  [] Weeping     LOC: [x] Alert      [x]Oriented:    [x] Person     [x] Place  [x]Time               [] Confused  [] Lethargic  [] Medicated  [] Non-responsive     GI / ABDOMEN:  [] Flat    [] Distended    [x] Soft    [] Firm   []  Obese                             [] Diarrhea  [x] Bowel Sounds  [] Nausea  [] Vomiting       / URINE ASSESSMENT:[] Voiding   [x] Oliguria  [] Anuria   []  Cline     [] Incontinent    []  Incontinent Brief      []  Bathroom Privileges       PAIN: [x] 0 []1  []2   []3   []4   []5   []6   []7   []8   []9   []10              Scale 0-10  Action/Follow Up:      MOBILITY:  [] Amb    [] Amb/Assist    [x] Bed    [] Wheelchair  [] Stretcher      All Vitals and Treatment Details on Attached [address]: [facility]          Room # 367/01      [] 1st Time Acute  [] Stat  [x] Routine  [] Urgent     [x] Acute Room  []  Bedside  [] ICU/CCU  [] ER   Isolation Precautions:  Contact      Special Considerations:         [] Blood Consent Verified [x]N/A     ALLERGIES:   Allergies   Allergen Reactions    Ciprofloxacin Hives    Statins-Hmg-Coa Reductase Inhibitors Other (comments)     Body ache               Code Status:DNR        Hepatitis Status:                        No results found for: HAMAT, HAAB, HABT, HAAT, HBSAG, HBSB, HBSAT, HBABN, HBCM, HBCAB, HBCAT, XBCABS, HBEAB, HBEAG, XHEPCS, 689525, HBEGLT, HBCMLT, HBCLT, HBEBLT, [id number], [id number], HAVMLT, 568799, HBCMLT, [id number], HCGAT                  Current Labs:   Lab Results   Component Value Date/Time    Sodium 140 06/21/2021 02:40 AM    Potassium 4.1 06/21/2021 02:40 AM    Chloride 104 06/21/2021 02:40 AM    CO2 25 06/21/2021 02:40 AM    Anion gap 11 06/21/2021 02:40 AM    Glucose 115 (H) 06/21/2021 02:40 AM    BUN 47 (H) 06/21/2021 02:40 AM    Creatinine 8.47 (H) 06/21/2021 02:40 AM    BUN/Creatinine ratio 6 (L) 06/21/2021 02:40 AM    GFR est AA 6 (L) 06/21/2021 02:40 AM    GFR est non-AA 5 (L) 06/21/2021 02:40 AM    Calcium 8.0 (L) 06/21/2021 02:40 AM Lab Results   Component Value Date/Time    WBC 4.8 06/21/2021 02:40 AM    Hemoglobin, POC 8.8 (L) 09/24/2020 08:45 AM    HGB 9.5 (L) 06/21/2021 02:40 AM    Hematocrit, POC 26 (L) 09/24/2020 08:45 AM    HCT 30.4 (L) 06/21/2021 02:40 AM    PLATELET 399 73/27/7120 02:40 AM    .0 (H) 06/21/2021 02:40 AM                                                                                     DIET: DIET ADULT       PRIMARY NURSE REPORT: First initial/Last name/Title      Pre Dialysis: Salvador Hill RN     Time: 1000      EDUCATION:    [x] Patient [] Other         Knowledge Basis: []None [x]Minimal [] Substantial   Barriers to learning  [x]N/A   [] Access Care     [] S&S of infection     [] Fluid Management     []K+     [x]Procedural    []Albumin     [] Medications     [] Tx Options     [] Transplant     [] Diet     [] Other   Teaching Tools:  [x] Explain  [] Demo  [] Handouts [] Video  Patient response:   [x] Verbalized understanding  [] Teach back  [] Return demonstration [] Requires follow up   Inappropriate due to            [x] Time Out/Safety Check  [x]Extracorporeal Circuit Tested for integrity       [address] - Before each treatment:     Machine Number:                   [facility]                                  [x] Unit Machine # 6 with centralized RO                                  [] Portable Machine #1/RO serial # [id number]                                  [] Portable Machine #2/RO serial # [id number]                                  [] Portable Machine #4/RO serial # [id number]                                                     [facility]                                  [] Portable Machine #11/RO serial # [id number]                                   [] Portable Machine #12/RO serial # [id number]                                  [] Portable Machine #13/RO serial #  [id number]      Alarm Test:  Pass time 0930               [x] RO/Machine Log Complete      Temp 36*-37*             Dialysate: pH  7.4 Conductivity: Meter   14     HD Machine   14                  TCD: 14  Dialyzer Lot # [id number]          Blood Tubing Lot # [id number]          Saline Lot #  [id number]     CHLORINE TESTING-Before each treatment and every 4 hours    Total Chlorine: [x] less than 0.1 ppm  Time: 0900 4 Hr/2nd Check Time: 1300   (if greater than 0.1 ppm from Primary then every 30 minutes from Secondary)     TREATMENT INITIATION - with Dialysis Precautions:   [x] All Connections Secured                 [x] Saline Line Double Clamped   [x] Venous Parameters Set                  [x] Arterial Parameters Set    [x] Prime Given 250ml                          [x]Air Foam Detector Engaged      Treatment Initiation Note:Pt in stable condition. AVF accessed and treatment initiated without complication. Dr Glenny Moreland at bedside. Post Assessment:   Dialyzer Cleared: [] Good [x] Fair  [] Poor  Blood processed:  51.7 L  UF Removed  1000 Ml  POst BP:   123/56       Pulse: 84        Respirations: 16  Temperature: 97.8 Lungs:     [x] Clear      [] Course         [] Crackles    [] Wheezing         [] Diminished   Post Tx Vascular Access:   AVF/AVG: Bleeding stopped   Art 5 min. Jasper. 5 Min    Cardiac:   [x] Regular   [] Irregular   [] Monitor  [] N/A      Rhythm:       Catheter:   Locking solution: Heparin 1:1000   Art. Jasper. N/A    Skin:   Pain:    [x] Warm  [x] Dry [] Diaphoretic    [] Flushed    [] Pale [] Cyanotic [x]0  []1  []2   []3  []4   []5   []6   []7   []8   []9   []10     Post Treatment Note:   HD well tolerated. 1L UF removed.  NAD noted during or post treatment       POST TREATMENT PRIMARY NURSE HANDOFF REPORT:     First initial/Last name/Title         Post Dialysis: Annemarie Barrera RN Time:  6025     Abbreviations: AVG-arterial venous graft, AVF-arterial venous fistula, IJ-Internal Jugular, Subcl-Subclavian, Fem-Femoral, Tx-treatment, AP/HR-apical heart rate, DFR-dialysate flow rate, BFR-blood flow rate, AP-arterial pressure, -venous pressure, UF-ultrafiltrate, TMP-transmembrane pressure, Jasper-Venous, Art-Arterial, RO-Reverse Osmosis

## 2021-06-21 NOTE — PROGRESS NOTES
Discharge order noted for today. Orders reviewed. Hospital to Home visit scheduled with [facility] health. Patients son, Florentin Portillo will be transporting patient home. No needs identified at this time.  remains available if needed.   Mellie Primrose RN - Outcomes Manager  [phone]

## 2021-06-21 NOTE — HOME CARE
Discharge noted for today. Received home health referral for [facility] for [facility]. Explanation home care services and routines provided. Demographics verified by Estefania Arora LPN, address confirmed: [address] [address]. Patient has the following DME: RW, rollator, BSC, SC, chair lift, and glucometer. Orders noted and arranged.     Boubacar Newman RN   Home Health Intake/Liaison

## 2021-06-21 NOTE — DISCHARGE INSTRUCTIONS
Preventing Falls: Care Instructions  Your Care Instructions     Getting around your home safely can be a challenge if you have injuries or health problems that make it easy for you to fall. Loose rugs and furniture in walkways are among the dangers for many older people who have problems walking or who have poor eyesight. People who have conditions such as arthritis, osteoporosis, or dementia also have to be careful not to fall. You can make your home safer with a few simple measures. Follow-up care is a key part of your treatment and safety. Be sure to make and go to all appointments, and call your doctor if you are having problems. It's also a good idea to know your test results and keep a list of the medicines you take. How can you care for yourself at home? Taking care of yourself  · You may get dizzy if you do not drink enough water. To prevent dehydration, drink plenty of fluids. Choose water and other caffeine-free clear liquids. If you have kidney, heart, or liver disease and have to limit fluids, talk with your doctor before you increase the amount of fluids you drink. · Exercise regularly to improve your strength, muscle tone, and balance. Walk if you can. Swimming may be a good choice if you cannot walk easily. · Have your vision and hearing checked each year or any time you notice a change. If you have trouble seeing and hearing, you might not be able to avoid objects and could lose your balance. · Know the side effects of the medicines you take. Ask your doctor or pharmacist whether the medicines you take can affect your balance. Sleeping pills or sedatives can affect your balance. · Limit the amount of alcohol you drink. Alcohol can impair your balance and other senses. · Ask your doctor whether calluses or corns on your feet need to be removed. If you wear loose-fitting shoes because of calluses or corns, you can lose your balance and fall.   · Talk to your doctor if you have numbness in your feet. Preventing falls at home  · Remove raised doorway thresholds, throw rugs, and clutter. Repair loose carpet or raised areas in the floor. · Move furniture and electrical cords to keep them out of walking paths. · Use nonskid floor wax, and wipe up spills right away, especially on ceramic tile floors. · If you use a walker or cane, put rubber tips on it. If you use crutches, clean the bottoms of them regularly with an abrasive pad, such as steel wool. · Keep your house well lit, especially Jacob Kroner, and outside walkways. Use night-lights in areas such as hallways and bathrooms. Add extra light switches or use remote switches (such as switches that go on or off when you clap your hands) to make it easier to turn lights on if you have to get up during the night. · Install sturdy handrails on stairways. · Move items in your cabinets so that the things you use a lot are on the lower shelves (about waist level). · Keep a cordless phone and a flashlight with new batteries by your bed. If possible, put a phone in each of the main rooms of your house, or carry a cell phone in case you fall and cannot reach a phone. Or, you can wear a device around your neck or wrist. You push a button that sends a signal for help. · Wear low-heeled shoes that fit well and give your feet good support. Use footwear with nonskid soles. Check the heels and soles of your shoes for wear. Repair or replace worn heels or soles. · Do not wear socks without shoes on wood floors. · Walk on the grass when the sidewalks are slippery. If you live in an area that gets snow and ice in the winter, sprinkle salt on slippery steps and sidewalks. Preventing falls in the bath  · Install grab bars and nonskid mats inside and outside your shower or tub and near the toilet and sinks. · Use shower chairs and bath benches. · Use a hand-held shower head that will allow you to sit while showering.   · Get into a tub or shower by putting the weaker leg in first. Get out of a tub or shower with your strong side first.  · Repair loose toilet seats and consider installing a raised toilet seat to make getting on and off the toilet easier. · Keep your bathroom door unlocked while you are in the shower. Where can you learn more? Go to http://www.hu.com/  Enter G117 in the search box to learn more about \"Preventing Falls: Care Instructions. \"  Current as of: December 7, 2020               Content Version: 12.8  © 6764-3319 [employer]. Care instructions adapted under license by [employer] (which disclaims liability or warranty for this information). If you have questions about a medical condition or this instruction, always ask your healthcare professional. [place] [address] any warranty or liability for your use of this information. Low Blood Pressure: Care Instructions  Your Care Instructions     Blood pressure is a measurement of the force of the blood against the walls of the blood vessels during and after each beat of the heart. Low blood pressure is also called hypotension. It means that your blood pressure is much lower than normal. Some people, especially young, slim women, may have slightly low blood pressure without symptoms. But in many people, low blood pressure can cause symptoms such as feeling dizzy or lightheaded. When your blood pressure is too low, your heart, brain, and other organs do not get enough blood. Low blood pressure can be caused by many things, including heart problems and some medicines. Diabetes that is not under control can cause your blood pressure to drop. And so can a severe allergic reaction or infection. Another cause is dehydration, which is when your body loses too much fluid. Treatment for low blood pressure depends on the cause. Follow-up care is a key part of your treatment and safety.  Be sure to make and go to all appointments, and call your doctor if you are having problems. It's also a good idea to know your test results and keep a list of the medicines you take. How can you care for yourself at home? · Be safe with medicines. Call your doctor if you think you are having a problem with your medicine. You will get more details on the specific medicines your doctor prescribes. · If you feel dizzy or lightheaded, sit down or lie down for a few minutes. Or you can sit down and put your head between your knees. This will help your blood pressure go back to normal and help your symptoms go away. · Follow your doctor's suggestions for ways to prevent symptoms like dizziness. These suggestions may include:  ? Get up slowly from bed or after sitting for a long time. If you are in bed, roll to your side and swing your legs over the edge of the bed and onto the floor. Push your body up to a sitting position. Wait for a while before you slowly stand up.  ? Add more salt to your diet, if your doctor recommends it. ? Drink plenty of fluids. Choose water and other caffeine-free clear liquids. If you have kidney, heart, or liver disease and have to limit fluids, talk with your doctor before you increase the amount of fluids you drink. ? Avoid or limit alcohol to 2 drinks a day for men and 1 drink a day for women. Alcohol may interfere with your medicine. In addition, alcohol can make your low blood pressure worse by causing your body to lose water. ? Avoid or limit caffeine. Caffeine can cause your body to lose water. ? Wear compression stockings to help improve blood flow. When should you call for help? Call 911 anytime you think you may need emergency care. For example, call if:    · You passed out (lost consciousness). Call your doctor now or seek immediate medical care if:    · You are dizzy or lightheaded, or you feel like you may faint.    Watch closely for changes in your health, and be sure to contact your doctor if you have any problems. Where can you learn more? Go to http://www.gray.com/  Enter C304 in the search box to learn more about \"Low Blood Pressure: Care Instructions. \"  Current as of: August 31, 2020               Content Version: 12.8  © 2006-2021 [employer]. Care instructions adapted under license by [employer] (which disclaims liability or warranty for this information). If you have questions about a medical condition or this instruction, always ask your healthcare professional. [address] any warranty or liability for your use of this information. Sepsis: Care Instructions  Overview     Sepsis is an intense reaction to an infection. It can cause damage to the body and lead to dangerously low blood pressure. You may have inflammation across large areas of your body. It can damage tissue and even go deep into your organs. Infections that can lead to sepsis include:  · A skin infection such as from a cut. · A lung infection like pneumonia. · A kidney infection. · A gut infection such as E. coli. Sepsis is treated with antibiotics. Your doctor will try to find the infection that led to sepsis. Joe Sebastian also get fluids through a vein (IV). Machines will track your vital signs, including temperature, blood pressure, breathing rate, and pulse rate. The physical and mental effects of sepsis may not be seen for several weeks after treatment. And they may last long after the infection is gone. Physical problems may include:  · Feeling weak and tired. · Feeling out of breath. · Aches and pains. · Problems with getting around. · Trouble falling asleep or staying asleep. · Dry and itchy skin, brittle nails, and hair loss. Some of these effects can lead to problems with your organs or your feet, legs, hands, or arms. Sepsis can also affect your mind and emotions. Problems may include:  · Self-doubt. · Anxiety. · Nightmares.   · Depression and mood problems. · Wanting to avoid other people. · Confusion. · Flashbacks and bad memories of your illness. It's important to care for yourself and try to avoid infections. This may lower your risk of getting sepsis again. Follow-up care is a key part of your treatment and safety. Be sure to make and go to all appointments, and call your doctor if you are having problems. It's also a good idea to know your test results and keep a list of the medicines you take. How can you care for yourself at home? · Be safe with medicines. Take your medicines exactly as prescribed. Call your doctor if you think you are having a problem with your medicine. · If your doctor prescribed antibiotics, take them as directed. Do not stop taking them just because you feel better. You need to take the full course of antibiotics. · Help prevent infections that could again lead to sepsis. ? Try to avoid colds and flu. If you must be around people who have a cold or the flu, wash your hands often. And get a flu vaccine every year. ? Ask your doctor if you need a pneumococcal vaccine (to prevent pneumonia, meningitis, and other infections). If you have had one before, ask your doctor if you need another dose. ? Clean any wounds or scrapes. · Do not smoke or use other tobacco products. When you quit smoking, you are less likely to get a cold, the flu, bronchitis, and pneumonia. If you need help quitting, talk to your doctor about stop-smoking programs and medicines. These can increase your chances of quitting for good. · Drink plenty of fluids to prevent dehydration. Choose water and other caffeine-free clear liquids until you feel better. If you have kidney, heart, or liver disease and have to limit fluids, talk with your doctor before you increase the amount of fluids you drink. · Eat a healthy diet. Include fruits, vegetables, and whole grains in your diet every day.   · If your doctor recommends it, try doing some physical activity. Walking is a good choice. Bit by bit, increase the amount you walk every day. · Talk with your family and friends about your challenges. Ask for help if you need it. · Keep a journal. Writing down your thoughts and feelings can help reduce your stress. · Ask family members to fill in gaps in your memory. · Set small goals for yourself that you can reach. Reward yourself for success. When should you call for help? Call  911 anytime you think you may need emergency care. For example, call if:    · You passed out (lost consciousness). Call your doctor now or seek immediate medical care if:    · You have symptoms such as:  ? Shortness of breath. ? Feeling very sick. ? Severe pain. ? A fast heart rate. ? Cool, pale, or clammy skin. ? Feeling confused. ? Feeling very sleepy, or you are hard to wake up.     · You are dizzy or lightheaded, or you feel like you may faint.     · You have a fever or chills. Watch closely for changes in your health, and be sure to contact your doctor if:    · You do not get better as expected. Where can you learn more? Go to http://jasmin-todd.info/  Enter T383 in the search box to learn more about \"Sepsis: Care Instructions. \"  Current as of: September 23, 2020               Content Version: 12.8  © 3763-1428 [employer]. Care instructions adapted under license by [employer] (which disclaims liability or warranty for this information). If you have questions about a medical condition or this instruction, always ask your healthcare professional. [place] [address] any warranty or liability for your use of this information. Urinary Tract Infection (UTI) in Women: Care Instructions  Overview     A urinary tract infection, or UTI, is a general term for an infection anywhere between the kidneys and the urethra (where urine comes out). Most UTIs are bladder infections.  They often cause pain or burning when you urinate. UTIs are caused by bacteria and can be cured with antibiotics. Be sure to complete your treatment so that the infection does not get worse. Follow-up care is a key part of your treatment and safety. Be sure to make and go to all appointments, and call your doctor if you are having problems. It's also a good idea to know your test results and keep a list of the medicines you take. How can you care for yourself at home? · Take your antibiotics as directed. Do not stop taking them just because you feel better. You need to take the full course of antibiotics. · Drink extra water and other fluids for the next day or two. This will help make the urine less concentrated and help wash out the bacteria that are causing the infection. (If you have kidney, heart, or liver disease and have to limit fluids, talk with your doctor before you increase the amount of fluids you drink.)  · Avoid drinks that are carbonated or have caffeine. They can irritate the bladder. · Urinate often. Try to empty your bladder each time. · To relieve pain, take a hot bath or lay a heating pad set on low over your lower belly or genital area. Never go to sleep with a heating pad in place. To prevent UTIs  · Drink plenty of water each day. This helps you urinate often, which clears bacteria from your system. (If you have kidney, heart, or liver disease and have to limit fluids, talk with your doctor before you increase the amount of fluids you drink.)  · Urinate when you need to. · If you are sexually active, urinate right after you have sex. · Change sanitary pads often. · Avoid douches, bubble baths, feminine hygiene sprays, and other feminine hygiene products that have deodorants. · After going to the bathroom, wipe from front to back. When should you call for help?    Call your doctor now or seek immediate medical care if:    · Symptoms such as fever, chills, nausea, or vomiting get worse or appear for the first time.     · You have new pain in your back just below your rib cage. This is called flank pain.     · There is new blood or pus in your urine.     · You have any problems with your antibiotic medicine. Watch closely for changes in your health, and be sure to contact your doctor if:    · You are not getting better after taking an antibiotic for 2 days.     · Your symptoms go away but then come back. Where can you learn more? Go to http://www.gray.com/  Enter V701 in the search box to learn more about \"Urinary Tract Infection (UTI) in Women: Care Instructions. \"  Current as of: June 29, 2020               Content Version: 12.8  © 7319-8986 [employer]. Care instructions adapted under license by [employer] (which disclaims liability or warranty for this information). If you have questions about a medical condition or this instruction, always ask your healthcare professional. [address] any warranty or liability for your use of this information.

## 2021-06-21 NOTE — PROGRESS NOTES
attempted to make a visit but patient was sleeping.  said a silent prayer at beside.  remains available for any spiritual care needs and will continue to follow patient through IDT.

## 2021-06-21 NOTE — ROUTINE PROCESS
1900: Bedside shift change report given to Shea Garza RN (oncoming nurse) by Irene Valerio RN (offgoing nurse). Report included the following information SBAR, Kardex and Cardiac Rhythm NSR.    0330: Luis Waldron from lab called to notify about drop in hemoglobin. Stated hgb dropped from 12.1 to 9.5    0700: Bedside shift change report given to Estelle Senior (oncoming nurse) by Shea Garza RN (offgoing nurse). Report included the following information SBAR, Kardex and Cardiac Rhythm NSR.

## 2021-06-21 NOTE — PROGRESS NOTES
0740:  Report received, care assumed. GINO Skinner Hospitalist on rounds, aware of persistent BP thru the night as documented. Will continue with oral Midodrine as ordered. 0930:  Order noted for Discharge home today after Hemodialysis treatment. 1020:  TRANSFER - OUT REPORT:  Verbal report given to JOSE ANTONIO Nielsen(name) on Randee Stern  being transferred to Dialysis (unit) for ordered procedure     Report consisted of patients Situation, Background, Assessment and   Recommendations(SBAR). Information from the following report(s) SBAR, Kardex, Intake/Output, MAR, Accordion, Recent Results, Cardiac Rhythm NSR. and Alarm Parameters  was reviewed with the receiving nurse. Lines:   Venous Access Device AV Fistula (Active)       Peripheral IV 06/18/21 Right Antecubital (Active)   Site Assessment Clean, dry, & intact 06/21/21 0400   Phlebitis Assessment 0 06/21/21 0400   Infiltration Assessment 0 06/21/21 0400   Dressing Status Clean, dry, & intact 06/21/21 0400   Dressing Type Transparent 06/21/21 0400   Hub Color/Line Status Pink;Capped 06/21/21 0400   Action Taken Open ports on tubing capped 06/21/21 0400   Alcohol Cap Used Yes 06/21/21 0400   Opportunity for questions and clarification was provided. Patient transported with:   [facility] services. 1530:  TRANSFER - IN REPORT:  Verbal report received from JOSE ANTONIO Nielsen(name) on Randee Stern  being received from Dialysis(unit) for routine progression of care    Report consisted of patients Situation, Background, Assessment and   Recommendations(SBAR). Information from the following report(s) Procedure Summary, Intake/Output, MAR and Recent Results was reviewed with the receiving nurse. Opportunity for questions and clarification was provided. Assessment completed upon patients arrival to unit and care assumed. 1700:  Discharge instructions reviewed with patient at length, she verbalizes understanding all info.  Discharged home via wheelchair to private vehicle, son driving. All personal belongings with patient including clothing, walker, cell phone and .

## 2021-06-28 LAB — VIT C SERPL-MCNC: 8.3 MG/DL (ref 0.4–2)

## 2021-07-01 ENCOUNTER — HOME CARE VISIT (OUTPATIENT)
Dept: SCHEDULING | Facility: HOME HEALTH | Age: 76
End: 2021-07-01

## 2021-07-01 PROCEDURE — G0299 HHS/HOSPICE OF RN EA 15 MIN: HCPCS

## 2021-07-02 ENCOUNTER — TELEPHONE (OUTPATIENT)
Dept: VASCULAR SURGERY | Age: 76
End: 2021-07-02

## 2021-07-02 DIAGNOSIS — N18.5 CKD (CHRONIC KIDNEY DISEASE) STAGE 5, GFR LESS THAN 15 ML/MIN (HCC): ICD-10-CM

## 2021-07-02 DIAGNOSIS — N18.5 CKD (CHRONIC KIDNEY DISEASE) STAGE 5, GFR LESS THAN 15 ML/MIN (HCC): Primary | ICD-10-CM

## 2021-07-02 NOTE — TELEPHONE ENCOUNTER
Discussed with Dr. Gregorio Last after receiving message from dialysis that pt is having numbness in hand and concerns for steal , per Dr. Gregorio Last pt needs to have [facility] and will call pt with results. Order for [facility] placed per verbal order from Dr. Gregorio Last.

## 2021-07-02 NOTE — TELEPHONE ENCOUNTER
----- Message from Alfreda Barbour MD sent at 7/1/2021  8:12 PM EDT -----  Yes. I would repeat steal study. 8 months is a long time for access. PMM  ----- Message -----  From: Wilfredo Villanueva RN  Sent: 7/1/2021   3:06 PM EDT  To: Alfreda Barbour MD    Got info over from dialysis that pt is having numbness in hand. We just did wbi 11/20 that showed no steal , should we repeat? Thanks.

## 2021-07-14 ENCOUNTER — TELEPHONE (OUTPATIENT)
Dept: VASCULAR SURGERY | Age: 76
End: 2021-07-14

## 2021-07-14 NOTE — TELEPHONE ENCOUNTER
Received communication from dialysis that pt is having hand numbness during and after treatment, pt had WBI to rule out steel , testing was normal but per provider wants to see the patient to discuss other options.

## 2021-09-09 ENCOUNTER — OFFICE VISIT (OUTPATIENT)
Dept: VASCULAR SURGERY | Age: 76
End: 2021-09-09
Payer: MEDICARE

## 2021-09-09 VITALS
HEART RATE: 58 BPM | SYSTOLIC BLOOD PRESSURE: 118 MMHG | OXYGEN SATURATION: 95 % | BODY MASS INDEX: 40.85 KG/M2 | HEIGHT: 62 IN | DIASTOLIC BLOOD PRESSURE: 68 MMHG | WEIGHT: 222 LBS

## 2021-09-09 DIAGNOSIS — Z99.2 ESRD (END STAGE RENAL DISEASE) ON DIALYSIS (HCC): Primary | ICD-10-CM

## 2021-09-09 DIAGNOSIS — N18.6 ESRD (END STAGE RENAL DISEASE) ON DIALYSIS (HCC): Primary | ICD-10-CM

## 2021-09-09 PROCEDURE — 99213 OFFICE O/P EST LOW 20 MIN: CPT | Performed by: SURGERY

## 2021-09-09 RX ORDER — MIDODRINE HYDROCHLORIDE 2.5 MG/1
1 TABLET ORAL
COMMUNITY
Start: 2021-04-16 | End: 2021-10-14

## 2021-09-09 NOTE — PROGRESS NOTES
[address]    Chief Complaint   Patient presents with    End Stage Renal Disease    Numbness       History and Physical    [address] is a 66 y.o. female with ESRD on hemodialysis. She returns in follow-up with complaints of left arm numbness during and after dialysis. She states that she has been experiencing numbness and pain of her arm and hand  for the past 6-7 months and it's constant. She states it is no worse during dialysis. She also endorses weakness associated with this. No aggravating or relieving factors. Pain level is severe at night or when trying to position the arm. Access has been in place for about 3 years. She started dialysis 1.5 years ago and did not have numbness and pain initially. She was previously diagnosed with carpal tunnel and wore a brace for a while. She states her blood pressure is low while on dialysis and was started on midodrine. The most recent PVL was reviewed and discussed with the patient: This shows no evidence of significant steal in the left upper extremity. However the signal at rest shows a DBI of 0.57, which is just at the cutoff of normal.  In addition, with compression of the fistula DBI increases to 0.85. Upper Extremity Arterial Findings    Left Upper Arterial    Normal (left DBI 0.6-0.8) with no evidence of significant steal.   Left digital pressures increase with compression from 0.57 to 0.85 suggestive of underlying arterial disease. No evidence of significant steal at rest with a DBI of 0.6. Right DBI is 0.91 at rest.   Arterial Pressure Measurements     Right Left Left comp   3rd Digit 108 mmHg       68 mmHg       101 mmHg         DBI (3rd Digit) 0.91        0.57        0.85          Brachial  mmHg                 Past Medical History:   Diagnosis Date    Acidosis     Anemia     Arteriovenous fistula (HCC)     Chronic kidney disease     on HD [facility] on [facility] on MWF.      CKD (chronic kidney disease)     Diabetes ([facility] [address] no meds now    ESRD (end stage renal disease) on dialysis ([facility] [address] 9/9/2021    HLD (hyperlipidemia)     HTN (hypertension)     Hyperparathyroidism due to renal insufficiency ([facility] [address]     Hypothyroid     Kidney stone     Lung mass     Recurrent UTI     Ureter, stricture     Uric acid nephrolithiasis     Urinary incontinence      Past Surgical History:   Procedure Laterality Date    HX APPENDECTOMY      HX CHOLECYSTECTOMY      HX GASTRIC BYPASS      HX KNEE ARTHROSCOPY      HX UROLOGICAL      right PCN placement    HX UROLOGICAL  07/23/2018    RIGHT URETEROSCOPY WITH HOLMIUM LASER    IR EXCHANGE NEPHRO PERC LT SI  2/21/2020    IR EXCHANGE NEPHRO PERC RT SI  4/13/2020    IR EXCHANGE NEPHRO PERC RT SI  7/17/2020    IR NEPHROSTOMY PERC RT PLC CATH  SI  10/14/2020    IR NEPHROURETERAL PERC RT PLC CATH NEW ACCESS  SI  4/30/2020    TN INTRO CATH DIALYSIS CIRCUIT DX ANGRPH FLUOR S&I Left 9/24/2020    FISTULOGRAM LEFT/poss permanent catheter placement performed by Jeet Leonardo MD at [facility] CATH LAB    VASCULAR SURGERY PROCEDURE UNLIST      lef AVF     Patient Active Problem List   Diagnosis Code    Nausea & vomiting R11.2    Pyelonephritis N49    Complicated urinary tract infection N39.0    Anemia of chronic renal failure N18.9, D63.1    Anemia D64.9    Hypotension I95.9    UTI (urinary tract infection) N39.0    Type 2 diabetes mellitus, without long-term current use of insulin ([facility]) E11.9    CKD (chronic kidney disease) stage 5, GFR less than 15 ml/min ([facility]) N18.5    Acquired hypothyroidism E03.9    GERD (gastroesophageal reflux disease) V01.3    Complicated UTI (urinary tract infection) N39.0    Disease of the lower urinary tract N39.9    Sepsis ([facility]) A41.9    Tachycardia R00.0    ESRD (end stage renal disease) on dialysis ([facility]) N18.6, Z99.2     Current Outpatient Medications   Medication Sig Dispense Refill    midodrine (PROAMATINE) 2.5 mg tablet Take 1 Tablet by mouth.       DULoxetine (Cymbalta) 20 mg capsule Take 20 mg by mouth daily.  fludrocortisone (FLORINEF) 0.1 mg tablet Take 1 Tab by mouth daily. 30 Tab 5    b complex vitamins (Vitamins B Complex) tablet Take 1 Tab by mouth daily.  biotin 1,000 mcg chew Take 1 Tab by mouth daily.  cyanocobalamin 1,000 mcg tablet Take 1,000 mcg by mouth daily.  gabapentin (NEURONTIN) 100 mg capsule Take 100 mg by mouth nightly. AS needed      lactobacillus sp. 50 billion cpu (BIO-K PLUS) 50 billion cell -375 mg cap capsule Take 1 Cap by mouth daily. 30 Cap 2    tamsulosin (FLOMAX) 0.4 mg capsule Take 1 Cap by mouth daily. 90 Cap 3    acetaminophen (TYLENOL) 325 mg tablet Take 650 mg by mouth two (2) times a day.  allopurinoL (ZYLOPRIM) 100 mg tablet Take 200 mg by mouth daily.  ascorbic acid, vitamin C, (VITAMIN C) 500 mg tablet Take 500 mg by mouth daily.  calcitRIOL (ROCALTROL) 0.25 mcg capsule Take 0.25 mcg by mouth daily.  cholecalciferol (VITAMIN D3) (2,000 UNITS /50 MCG) cap capsule Take 2,000 Units by mouth two (2) times a day. Take two tabs a total of 4000 units      latanoprost (XALATAN) 0.005 % ophthalmic solution Administer 1 Drop to both eyes nightly. One drop at bedtime      levothyroxine (SYNTHROID) 125 mcg tablet Take 125 mcg by mouth Daily (before breakfast).  omeprazole (PRILOSEC) 20 mg capsule Take 20 mg by mouth daily.  ondansetron (ZOFRAN ODT) 4 mg disintegrating tablet Take 4 mg by mouth every eight (8) hours as needed for Nausea or Vomiting.  vit B Cmplx 3-FA-Vit C-Biotin (NEPHRO HOWIE RX) 1- mg-mg-mcg tablet Take 1 Tab by mouth daily.  estradioL (Estrace) 0.01 % (0.1 mg/gram) vaginal cream Apply a fingertip amount around the urethra three times a week. (Patient not taking: Reported on 9/9/2021) 30 g 3    sodium bicarbonate 650 mg tablet Take 2 Tabs by mouth three (3) times daily.  Indications: excess body acid (Patient not taking: Reported on 9/9/2021) 30 Tab 1     Allergies   Allergen Reactions    Ciprofloxacin Hives    Statins-Hmg-Coa Reductase Inhibitors Other (comments)     Body ache     Social History     Socioeconomic History    Marital status: SINGLE     Spouse name: Not on file    Number of children: Not on file    Years of education: Not on file    Highest education level: Not on file   Occupational History    Not on file   Tobacco Use    Smoking status: Never Smoker    Smokeless tobacco: Never Used   Vaping Use    Vaping Use: Never used   Substance and Sexual Activity    Alcohol use: Never    Drug use: Never    Sexual activity: Not on file   Other Topics Concern    Not on file   Social History Narrative    Not on file     Social Determinants of Health     Financial Resource Strain:     Difficulty of Paying Living Expenses:    Food Insecurity:     Worried About Running Out of Food in the Last Year:     [address] in the Last Year:    Transportation Needs:     Lack of Transportation (Medical):  Lack of Transportation (Non-Medical):    Physical Activity:     Days of Exercise per Week:     Minutes of Exercise per Session:    Stress:     Feeling of Stress :    Social Connections:     Frequency of Communication with Friends and Family:     Frequency of Social Gatherings with Friends and Family:     Attends Christianity Services:     Active Member of Clubs or Organizations:     Attends Club or Organization Meetings:     Marital Status:    Intimate Partner Violence:     Fear of Current or Ex-Partner:     Emotionally Abused:     Physically Abused:     Sexually Abused:       Family History   Problem Relation Age of Onset    Heart Surgery Sister        Physical Exam:    Visit Vitals  Ht 5' 2\" (1.575 m)   Wt 222 lb (100.7 kg)   BMI 40.60 kg/m²        Constitutional:  Patient is well developed, well nourished, and not distressed. HEENT: atraumatic, normocephalic, wearing a mask.    Eyes:   Cunjunctivae clear, no scleral icterus  Cardiovascular:  Normal rate, regular rhythm  Pulses:       Right:     Radial 2+ Left:      Radial 1+, 2+ with fistula compression   Left upper arm AVF with strong thrill throughout, minimal     Pulmonary/Chest: Effort normal   Extremities: Normal range of motion. No edema. Digits no cyanosis or clubbing  Neurological:  she  is alert and oriented x3 . Gait normal.    Psych: Appropriate mood and affect. Skin:  Skin is warm and dry. No rash noted. No erythema. No ulcers. Impression and Plan:  Katherine Huff is a 66 y.o. female with pain and numbness in the access arm. Steal study shows borderline normal flow dynamics. DBI with compression of the fistula is 0.85, which could be suggestive of some underlying arterial disease. States she had incomplete nerve conduction study performed due to presence of fistula. Has not been evaluated for any C spine etiology which might be contributory given that patient endorses pain and numbness in the proximal arm as well. Will have her do squeeze ball exercises for 1 month and return in follow up after that. Dicussed that she does have some arterial disease but this may not be cause of her symptoms. However, if there is no improvement in her symptoms with arm conditioning (squeeze ball exercises), she may benefit from an arteriogram to eval for inflow stenoses. She may ultimately require evaluation by either neurology or ortho spine. We reviewed the plan with the patient and the patient understands. Aleksey Jurado MD    PLEASE NOTE:  This document has been produced using voice recognition software. Unrecognized errors in transcription may be present.

## 2021-09-09 NOTE — PROGRESS NOTES
1. Have you been to an emergency room or urgent care clinic since your last visit? Yes, [facility]    Hospitalized since your last visit? If yes, where, when, and reason for visit? Yes, [facility]  2. Have you seen or consulted any other health care providers outside of the [facility] since your last visit including any procedures, health maintenance items. If yes, where, when and reason for visit?  Yes, PCP

## 2021-09-20 NOTE — H&P (VIEW-ONLY)
Randee Stern  1945           ASSESSMENT:  PVR 0 cc (Unable to provide sample)     1. Right distal ureteral stricture              Previously with NephU catheter now converted to [address] stent. Last stent exchange 3/2/21 by Dr. Jyotsna Munoz. 2. ESRD on HD   3. Obesity - Body mass index is 37.5 kg/m². 4. DM 2    Still makes too much urine to pull stent. Continue with routine stent exchange. Consider once becomes anuric to pull stent in setting of sterile urine with instillation of gentamicin into collecting system. PLAN:  1. Continue behavioral modifications for prevention of UTIs. 2. Continue Estrogen cream. Refills as needed. 3. Patient not able to give urine sample. Patient to return with sample within a 2-hour window. Will culture and prescribe antibiotics as needed. 4. Plan for right cystoscopy and JJ stent exchange on 10/5/21. RTC post-op. DISCUSSION:  Body mass index is 37.5 kg/m². Patient's BMI is out of the normal parameters. Information about BMI was given and patient was advised to follow-up with their PCP for further management. Recurrent UTI -   Discussed behavioral modifications for prevention of UTIs including increasing fluid intake, timed voiding, and use of cranberry and D-mannose supplements. Discussed a bowel regiment using Miralax and Metamucil for soft stools. Also discussed use of a squatty potty for bowel movements. Discussed use of vaginal estrogen. No history of breast, ovarian, or uterine cancer. Advised patient to eat yogurt or supplement with a probiotic daily. CC: Ureteral stricture     HISTORY OF PRESENT ILLNESS: Fozia Ro is a 64 y.o. female who presents today for pre-op right JJ stent exchange on 10/5/21. Established patient of Dr. Jyotsna Munoz last seen in office 9/30/20. Patient has a history of ESRD on HD, still oliguric, with right ureteral stricture.  Previously managed by nephrostomy tube since 2018 and is s/p multiple stone surgeries in the past with a history of stent migration. She had the tube changed to a nephroureteral catheter on 7/17/2020. Patient was bothered by bag, and converted to 8F JJ stent without issues. Last stent exchanged 3/2/21 by Dr. Marita Mcguire. Patient has had several UTIs in the interim. Patient notes last infection about a week ago. Currently on course of penicillin with benefit. Asymptomatic today for urinary tract infection. Denies dysuria, gross hematuria, f/c/n/v. Patient notes she no longer routinely voids, but is making enough urine daily with urinary incontinence when she coughs, stands, or laughs. 1-2 ppd. She notes continued stent discomfort that is manageable. No other urinary complaints at this time. REVIEW OF LABS & IMAGING:  CT A/P 7/15/2020  I personally reviewed the CT abdomen/pelvis dated 4/2020 revealing dislodged nephrostomy tube and hyronephrosis. IMPRESSION:   1. Stable right nephroureterostomy without increasing hydronephrosis on the  Stone. 2. No secondary evidence of mass, bowel obstruction, ascites, hernia, or focal  inflammatory process. 3. Bilateral renal cystic disease. 4. Sigmoid diverticulosis without diverticulitis. Review of Systems  Constitutional: Fever: No  Skin: Rash: No  HEENT: Hearing difficulty: No  Eyes: Blurred vision: No  Cardiovascular: Chest pain: No  Respiratory: Shortness of breath: No  Gastrointestinal: Nausea/vomiting: Yes  Musculoskeletal: Back pain: No  Neurological: Weakness: Yes  Psychological: Memory loss: No  Comments/additional findings:         Past Medical History:   Diagnosis Date    Acidosis     Anemia     Arteriovenous fistula (HCC)     Chronic kidney disease     on HD at [facility] on MWF.      CKD (chronic kidney disease)     Diabetes (HCC)     no meds now    ESRD (end stage renal disease) on dialysis ([facility] [address] 9/9/2021    HLD (hyperlipidemia)     HTN (hypertension)     Hyperparathyroidism due to renal insufficiency ([facility] [address]     Hypothyroid     Kidney stone     Lung mass     Recurrent UTI     Ureter, stricture     Uric acid nephrolithiasis     Urinary incontinence        Past Surgical History:   Procedure Laterality Date    HX APPENDECTOMY      HX CHOLECYSTECTOMY      HX GASTRIC BYPASS      HX KNEE ARTHROSCOPY      HX UROLOGICAL      right PCN placement    HX UROLOGICAL  07/23/2018    RIGHT URETEROSCOPY WITH HOLMIUM LASER    IR EXCHANGE NEPHRO PERC LT SI  2/21/2020    IR EXCHANGE NEPHRO PERC RT SI  4/13/2020    IR EXCHANGE NEPHRO PERC RT SI  7/17/2020    IR NEPHROSTOMY PERC RT PLC CATH  SI  10/14/2020    IR NEPHROURETERAL PERC RT PLC CATH NEW ACCESS  SI  4/30/2020    [place] INTRO CATH DIALYSIS CIRCUIT DX ANGRPH FLUOR S&I Left 9/24/2020    FISTULOGRAM LEFT/poss permanent catheter placement performed by Luis F Carvalho MD at [facility] CATH LAB    VASCULAR SURGERY PROCEDURE UNLIST      lef AVF       Allergies   Allergen Reactions    Ciprofloxacin Hives    Cyclopentolate Unknown (comments)    Iron Sucrose Diarrhea    Midodrine Unknown (comments)    Statins-Hmg-Coa Reductase Inhibitors Other (comments)     Body ache       Current Outpatient Medications   Medication Sig    vitamin B complex (B COMPLEX VITAMINS PO) Take 1 Tablet by mouth daily.  heparin sod,porcine/0.9 % NaCl (heparin flush, porcine, in ns) 100 unit/mL kit Heparin Sodium (Porcine) 1,000 Units/mL Systemic    amLODIPine (NORVASC) 5 mg tablet Take 1 Tablet by mouth.  amoxicillin-clavulanate (AUGMENTIN) 500-125 mg per tablet TAKE 1 TABLET BY MOUTH ONCE DAILY . ON DIALYSIS DAYS TAKE DOSE AFTER DIALYSIS. TAKE UNTIL FINISHED    docusate sodium (COLACE) 100 mg capsule Take  by mouth.  doxercalciferol (HECTOROL IV) 4 mcg.  ferrous gluconate 324 mg (38 mg iron) tablet Take 324 mg by mouth.  HYDROmorphone (DILAUDID) 2 mg tablet     lactobacillus rhamnosus  20 billion cell cap Take  by mouth.     meclizine (ANTIVERT) 25 mg tablet Take 1 Tablet by mouth.  methoxy peg-epoetin beta (MIRCERA INJECTION) 30 mcg.  Narcan 4 mg/actuation nasal spray     vitamin D3-folic acid 0,813 unit- 1 mg tab Take 1 Tablet by mouth.  midodrine (PROAMATINE) 2.5 mg tablet Take 1 Tablet by mouth.  DULoxetine (Cymbalta) 20 mg capsule Take 20 mg by mouth daily.  fludrocortisone (FLORINEF) 0.1 mg tablet Take 1 Tab by mouth daily.  b complex vitamins (Vitamins B Complex) tablet Take 1 Tab by mouth daily.  estradioL (Estrace) 0.01 % (0.1 mg/gram) vaginal cream Apply a fingertip amount around the urethra three times a week.  biotin 1,000 mcg chew Take 1 Tab by mouth daily.  cyanocobalamin 1,000 mcg tablet Take 1,000 mcg by mouth daily.  gabapentin (NEURONTIN) 100 mg capsule Take 100 mg by mouth nightly. AS needed    lactobacillus sp. 50 billion cpu (BIO-K PLUS) 50 billion cell -375 mg cap capsule Take 1 Cap by mouth daily.  tamsulosin (FLOMAX) 0.4 mg capsule Take 1 Cap by mouth daily.  sodium bicarbonate 650 mg tablet Take 2 Tabs by mouth three (3) times daily. Indications: excess body acid    acetaminophen (TYLENOL) 325 mg tablet Take 650 mg by mouth two (2) times a day.  allopurinoL (ZYLOPRIM) 100 mg tablet Take 200 mg by mouth daily.  ascorbic acid, vitamin C, (VITAMIN C) 500 mg tablet Take 500 mg by mouth daily.  calcitRIOL (ROCALTROL) 0.25 mcg capsule Take 0.25 mcg by mouth daily.  cholecalciferol (VITAMIN D3) (2,000 UNITS /50 MCG) cap capsule Take 2,000 Units by mouth two (2) times a day. Take two tabs a total of 4000 units    latanoprost (XALATAN) 0.005 % ophthalmic solution Administer 1 Drop to both eyes nightly. One drop at bedtime    levothyroxine (SYNTHROID) 125 mcg tablet Take 125 mcg by mouth Daily (before breakfast).  omeprazole (PRILOSEC) 20 mg capsule Take 20 mg by mouth daily.  ondansetron (ZOFRAN ODT) 4 mg disintegrating tablet Take 4 mg by mouth every eight (8) hours as needed for Nausea or Vomiting.  vit B Cmplx 3-FA-Vit C-Biotin (NEPHRO HOWIE RX) 1- mg-mg-mcg tablet Take 1 Tab by mouth daily.  sevelamer (RENAGEL) 800 mg tablet Take 1 Tablet by mouth. (Patient not taking: Reported on 9/21/2021)    traMADoL (ULTRAM) 50 mg tablet Take 4 Tablets by mouth. (Patient not taking: Reported on 9/21/2021)     No current facility-administered medications for this visit. Family History   Problem Relation Age of Onset    Heart Surgery Sister        Social History     Socioeconomic History    Marital status: SINGLE     Spouse name: Not on file    Number of children: Not on file    Years of education: Not on file    Highest education level: Not on file   Tobacco Use    Smoking status: Never Smoker    Smokeless tobacco: Never Used   Vaping Use    Vaping Use: Never used   Substance and Sexual Activity    Alcohol use: Never    Drug use: Never     Social Determinants of Health     Financial Resource Strain:     Difficulty of Paying Living Expenses:    Food Insecurity:     Worried About Running Out of Food in the Last Year:     Ran Out of Food in the Last Year:    Transportation Needs:     Lack of Transportation (Medical):  Lack of Transportation (Non-Medical):    Physical Activity:     Days of Exercise per Week:     Minutes of Exercise per Session:    Stress:     Feeling of Stress :    Social Connections:     Frequency of Communication with Friends and Family:     Frequency of Social Gatherings with Friends and Family:     Attends [employer] Services:     Active Member of Clubs or Organizations:     Attends Club or Organization Meetings:     Marital Status:          PHYSICAL EXAMINATION:   Visit Vitals  Temp 97.3 °F (36.3 °C)   Ht 5' 2\" (1.575 m)   Wt 205 lb 0.4 oz (93 kg)   BMI 37.50 kg/m²     Constitutional: Well developed, well-nourished female in no acute distress.    CV:  No peripheral swelling noted  Respiratory: No respiratory distress or difficulties   Female:  No abdominal tenderness. No CVA tenderness. Skin:  Normal color. No evidence of jaundice. Neuro/Psych:  Patient with appropriate affect. Alert and oriented. Lymphatic:   No enlargement of supraclavicular lymph nodes. Lillie Ennis PA-C  Urology of [address] [facility], [address] [facility]  [place], [address]  P: [phone]   F: [phone]      ICD-10-CM ICD-9-CM    1. Pre-op testing  Z01.818 V72.84 AMB POC PVR, JEROME,POST-VOID RES,US,NON-IMAGING   2.  Recurrent UTI  N39.0 599.0 AMB POC PVR, JEROME,POST-VOID RES,US,NON-IMAGING

## 2021-09-30 ENCOUNTER — HOSPITAL ENCOUNTER (OUTPATIENT)
Dept: PREADMISSION TESTING | Age: 76
Discharge: HOME | End: 2021-09-30
Payer: MEDICARE

## 2021-09-30 PROCEDURE — U0003 INFECTIOUS AGENT DETECTION BY NUCLEIC ACID (DNA OR RNA); SEVERE ACUTE RESPIRATORY SYNDROME CORONAVIRUS 2 (SARS-COV-2) (CORONAVIRUS DISEASE [COVID-19]), AMPLIFIED PROBE TECHNIQUE, MAKING USE OF HIGH THROUGHPUT TECHNOLOGIES AS DESCRIBED BY CMS-2020-01-R: HCPCS

## 2021-10-01 LAB — SARS-COV-2, NAA: NOT DETECTED

## 2021-10-01 NOTE — PERIOP NOTES
PRE-SURGICAL INSTRUCTIONS        Patient's Name:  Sharlene Bailon      [id number] Date:  10/1/2021            Covid Testing Date and Time:    Surgery Date:  10/5/2021                1. Do NOT eat or drink anything, including candy, gum, or ice chips after midnight on 10/1/21, unless you have specific instructions from your surgeon or anesthesia provider to do so.  2. You may brush your teeth before coming to the hospital.  3. No smoking 24 hours prior to the day of surgery. 4. No alcohol 24 hours prior to the day of surgery. 5. No recreational drugs for one week prior to the day of surgery. 6. Leave all valuables, including money/purse, at home. 7. Remove all jewelry, nail polish, acrylic nails, and makeup (including mascara); no lotions powders, deodorant, or perfume/cologne/after shave on the skin. 8. Follow instruction for Hibiclens washes and CHG wipes from surgeon's office. 9. Glasses/contact lenses and dentures may be worn to the hospital.  They will be removed prior to surgery. 10. Call your doctor if symptoms of a cold or illness develop within 24-48 hours prior to your surgery. 11.  If you are having an outpatient procedure, please make arrangements for a responsible ADULT TO [address] [place] [address] and stay with you for 24 hours after your surgery. 12. ONE VISITOR in the hospital at this time for outpatient procedures. Exceptions may be made for surgical admissions, per nursing unit guidelines      Special Instructions:      Bring list of CURRENT medications. .  Bring any pertinent legal medical records. Take these medications the morning of surgery with a sip of water:  As directed by MD  Follow physician instructions about stopping anticoagulants. On the day of surgery, come in the main entrance of [facility]. Let the  at the desk know you are there for surgery. A staff member will come escort you to the surgical area on the second floor.     If you have any questions or concerns, please do not hesitate to call:     (Prior to the day of surgery) PAT department:  [phone]   (Day of surgery) Pre-Op department:  [phone]    These surgical instructions were reviewed with [facility] during the [facility] phone call.

## 2021-10-04 ENCOUNTER — ANESTHESIA EVENT (OUTPATIENT)
Dept: SURGERY | Age: 76
DRG: 853 | End: 2021-10-04
Payer: MEDICARE

## 2021-10-05 ENCOUNTER — APPOINTMENT (OUTPATIENT)
Dept: GENERAL RADIOLOGY | Age: 76
DRG: 853 | End: 2021-10-05
Attending: UROLOGY
Payer: MEDICARE

## 2021-10-05 ENCOUNTER — HOSPITAL ENCOUNTER (OUTPATIENT)
Age: 76
Discharge: HOME | DRG: 853 | End: 2021-10-05
Attending: UROLOGY | Admitting: UROLOGY
Payer: MEDICARE

## 2021-10-05 ENCOUNTER — ANESTHESIA (OUTPATIENT)
Dept: SURGERY | Age: 76
DRG: 853 | End: 2021-10-05
Payer: MEDICARE

## 2021-10-05 VITALS
OXYGEN SATURATION: 100 % | DIASTOLIC BLOOD PRESSURE: 60 MMHG | TEMPERATURE: 97.8 F | HEART RATE: 86 BPM | RESPIRATION RATE: 20 BRPM | HEIGHT: 62 IN | SYSTOLIC BLOOD PRESSURE: 101 MMHG | BODY MASS INDEX: 38.28 KG/M2 | WEIGHT: 208 LBS

## 2021-10-05 PROBLEM — N13.30 HYDRONEPHROSIS: Status: ACTIVE | Noted: 2021-10-05

## 2021-10-05 LAB
ANION GAP SERPL CALC-SCNC: 7 MMOL/L (ref 3–18)
BUN SERPL-MCNC: 29 MG/DL (ref 7–18)
BUN/CREAT SERPL: 5 (ref 12–20)
CALCIUM SERPL-MCNC: 8.5 MG/DL (ref 8.5–10.1)
CHLORIDE SERPL-SCNC: 100 MMOL/L (ref 100–111)
CO2 SERPL-SCNC: 31 MMOL/L (ref 21–32)
CREAT SERPL-MCNC: 5.89 MG/DL (ref 0.6–1.3)
GLUCOSE BLD STRIP.AUTO-MCNC: 75 MG/DL (ref 70–110)
GLUCOSE BLD STRIP.AUTO-MCNC: 81 MG/DL (ref 70–110)
GLUCOSE SERPL-MCNC: 92 MG/DL (ref 74–99)
POTASSIUM SERPL-SCNC: 5.3 MMOL/L (ref 3.5–5.5)
SODIUM SERPL-SCNC: 138 MMOL/L (ref 136–145)

## 2021-10-05 PROCEDURE — 00910 ANES TRANSURETHRAL PX NOS: CPT | Performed by: ANESTHESIOLOGY

## 2021-10-05 PROCEDURE — C2617 STENT, NON-COR, TEM W/O DEL: HCPCS | Performed by: UROLOGY

## 2021-10-05 PROCEDURE — C1769 GUIDE WIRE: HCPCS | Performed by: UROLOGY

## 2021-10-05 PROCEDURE — 99100 ANES PT EXTEME AGE<1 YR&>70: CPT | Performed by: NURSE ANESTHETIST, CERTIFIED REGISTERED

## 2021-10-05 PROCEDURE — 0T768DZ DILATION OF RIGHT URETER WITH INTRALUMINAL DEVICE, VIA NATURAL OR ARTIFICIAL OPENING ENDOSCOPIC: ICD-10-PCS | Performed by: UROLOGY

## 2021-10-05 PROCEDURE — 74011250637 HC RX REV CODE- 250/637: Performed by: NURSE ANESTHETIST, CERTIFIED REGISTERED

## 2021-10-05 PROCEDURE — 99100 ANES PT EXTEME AGE<1 YR&>70: CPT | Performed by: ANESTHESIOLOGY

## 2021-10-05 PROCEDURE — 0TP98DZ REMOVAL OF INTRALUMINAL DEVICE FROM URETER, VIA NATURAL OR ARTIFICIAL OPENING ENDOSCOPIC: ICD-10-PCS | Performed by: UROLOGY

## 2021-10-05 PROCEDURE — 77030019927 HC TBNG IRR CYSTO BAXT -A: Performed by: UROLOGY

## 2021-10-05 PROCEDURE — 76210000006 HC OR PH I REC 0.5 TO 1 HR: Performed by: UROLOGY

## 2021-10-05 PROCEDURE — 87086 URINE CULTURE/COLONY COUNT: CPT

## 2021-10-05 PROCEDURE — 76010000138 HC OR TIME 0.5 TO 1 HR: Performed by: UROLOGY

## 2021-10-05 PROCEDURE — 80048 BASIC METABOLIC PNL TOTAL CA: CPT

## 2021-10-05 PROCEDURE — C1758 CATHETER, URETERAL: HCPCS | Performed by: UROLOGY

## 2021-10-05 PROCEDURE — 74011250636 HC RX REV CODE- 250/636: Performed by: NURSE ANESTHETIST, CERTIFIED REGISTERED

## 2021-10-05 PROCEDURE — 00910 ANES TRANSURETHRAL PX NOS: CPT | Performed by: NURSE ANESTHETIST, CERTIFIED REGISTERED

## 2021-10-05 PROCEDURE — 74011000250 HC RX REV CODE- 250: Performed by: NURSE ANESTHETIST, CERTIFIED REGISTERED

## 2021-10-05 PROCEDURE — BT1D1ZZ FLUOROSCOPY OF RIGHT KIDNEY, URETER AND BLADDER USING LOW OSMOLAR CONTRAST: ICD-10-PCS | Performed by: UROLOGY

## 2021-10-05 PROCEDURE — 2709999900 HC NON-CHARGEABLE SUPPLY: Performed by: UROLOGY

## 2021-10-05 PROCEDURE — 76210000020 HC REC RM PH II FIRST 0.5 HR: Performed by: UROLOGY

## 2021-10-05 PROCEDURE — 82962 GLUCOSE BLOOD TEST: CPT

## 2021-10-05 PROCEDURE — 74011000636 HC RX REV CODE- 636: Performed by: UROLOGY

## 2021-10-05 PROCEDURE — 74420 UROGRAPHY RTRGR +-KUB: CPT

## 2021-10-05 PROCEDURE — 76060000032 HC ANESTHESIA 0.5 TO 1 HR: Performed by: UROLOGY

## 2021-10-05 DEVICE — URETERAL STENT
Type: IMPLANTABLE DEVICE | Site: URETER | Status: FUNCTIONAL
Brand: POLARIS™ ULTRA

## 2021-10-05 RX ORDER — NALOXONE HYDROCHLORIDE 0.4 MG/ML
0.1 INJECTION, SOLUTION INTRAMUSCULAR; INTRAVENOUS; SUBCUTANEOUS ONCE
Status: CANCELLED | OUTPATIENT
Start: 2021-10-05 | End: 2021-10-06

## 2021-10-05 RX ORDER — CEFTRIAXONE 1 G/1
INJECTION, POWDER, FOR SOLUTION INTRAMUSCULAR; INTRAVENOUS AS NEEDED
Status: DISCONTINUED | OUTPATIENT
Start: 2021-10-05 | End: 2021-10-05 | Stop reason: HOSPADM

## 2021-10-05 RX ORDER — SODIUM CHLORIDE 0.9 % (FLUSH) 0.9 %
5-40 SYRINGE (ML) INJECTION AS NEEDED
Status: CANCELLED | OUTPATIENT
Start: 2021-10-05

## 2021-10-05 RX ORDER — INSULIN LISPRO 100 [IU]/ML
INJECTION, SOLUTION INTRAVENOUS; SUBCUTANEOUS ONCE
Status: CANCELLED | OUTPATIENT
Start: 2021-10-05 | End: 2021-10-06

## 2021-10-05 RX ORDER — PROPOFOL 10 MG/ML
VIAL (ML) INTRAVENOUS
Status: DISCONTINUED | OUTPATIENT
Start: 2021-10-05 | End: 2021-10-05 | Stop reason: HOSPADM

## 2021-10-05 RX ORDER — SODIUM CHLORIDE 0.9 % (FLUSH) 0.9 %
5-40 SYRINGE (ML) INJECTION EVERY 8 HOURS
Status: CANCELLED | OUTPATIENT
Start: 2021-10-05

## 2021-10-05 RX ORDER — SODIUM CHLORIDE 9 MG/ML
25 INJECTION, SOLUTION INTRAVENOUS CONTINUOUS
Status: DISCONTINUED | OUTPATIENT
Start: 2021-10-05 | End: 2021-10-05 | Stop reason: HOSPADM

## 2021-10-05 RX ORDER — DEXTROSE 50 % IN WATER (D50W) INTRAVENOUS SYRINGE
25-50 AS NEEDED
Status: CANCELLED | OUTPATIENT
Start: 2021-10-05

## 2021-10-05 RX ORDER — LIDOCAINE HYDROCHLORIDE 20 MG/ML
INJECTION, SOLUTION EPIDURAL; INFILTRATION; INTRACAUDAL; PERINEURAL AS NEEDED
Status: DISCONTINUED | OUTPATIENT
Start: 2021-10-05 | End: 2021-10-05 | Stop reason: HOSPADM

## 2021-10-05 RX ORDER — EPHEDRINE SULFATE/0.9% NACL/PF 50 MG/5 ML
SYRINGE (ML) INTRAVENOUS AS NEEDED
Status: DISCONTINUED | OUTPATIENT
Start: 2021-10-05 | End: 2021-10-05 | Stop reason: HOSPADM

## 2021-10-05 RX ORDER — MAGNESIUM SULFATE 100 %
4 CRYSTALS MISCELLANEOUS AS NEEDED
Status: CANCELLED | OUTPATIENT
Start: 2021-10-05

## 2021-10-05 RX ORDER — FAMOTIDINE 20 MG/1
20 TABLET, FILM COATED ORAL ONCE
Status: COMPLETED | OUTPATIENT
Start: 2021-10-05 | End: 2021-10-05

## 2021-10-05 RX ORDER — FENTANYL CITRATE 50 UG/ML
INJECTION, SOLUTION INTRAMUSCULAR; INTRAVENOUS AS NEEDED
Status: DISCONTINUED | OUTPATIENT
Start: 2021-10-05 | End: 2021-10-05 | Stop reason: HOSPADM

## 2021-10-05 RX ORDER — INSULIN LISPRO 100 [IU]/ML
INJECTION, SOLUTION INTRAVENOUS; SUBCUTANEOUS ONCE
Status: DISCONTINUED | OUTPATIENT
Start: 2021-10-05 | End: 2021-10-05 | Stop reason: HOSPADM

## 2021-10-05 RX ORDER — PROPOFOL 10 MG/ML
INJECTION, EMULSION INTRAVENOUS AS NEEDED
Status: DISCONTINUED | OUTPATIENT
Start: 2021-10-05 | End: 2021-10-05 | Stop reason: HOSPADM

## 2021-10-05 RX ADMIN — Medication 10 MG: at 14:57

## 2021-10-05 RX ADMIN — PROPOFOL 60 MCG/KG/MIN: 10 INJECTION, EMULSION INTRAVENOUS at 14:34

## 2021-10-05 RX ADMIN — FAMOTIDINE 20 MG: 20 TABLET, FILM COATED ORAL at 11:55

## 2021-10-05 RX ADMIN — PROPOFOL 50 MG: 10 INJECTION, EMULSION INTRAVENOUS at 14:34

## 2021-10-05 RX ADMIN — CEFTRIAXONE 1 G: 1 INJECTION, POWDER, FOR SOLUTION INTRAMUSCULAR; INTRAVENOUS at 14:34

## 2021-10-05 RX ADMIN — SODIUM CHLORIDE 25 ML/HR: 900 INJECTION, SOLUTION INTRAVENOUS at 11:55

## 2021-10-05 RX ADMIN — LIDOCAINE HYDROCHLORIDE 80 MG: 20 INJECTION, SOLUTION EPIDURAL; INFILTRATION; INTRACAUDAL; PERINEURAL at 14:34

## 2021-10-05 RX ADMIN — FENTANYL CITRATE 20 MCG: 50 INJECTION, SOLUTION INTRAMUSCULAR; INTRAVENOUS at 14:10

## 2021-10-05 RX ADMIN — FENTANYL CITRATE 40 MCG: 50 INJECTION, SOLUTION INTRAMUSCULAR; INTRAVENOUS at 14:31

## 2021-10-05 RX ADMIN — Medication 10 MG: at 14:53

## 2021-10-05 NOTE — ANESTHESIA POSTPROCEDURE EVALUATION
Procedure(s):  CYSTOSCOPY, RIGHT DOUBLE J STENT EXCHANGE/C-ARM.     general    Anesthesia Post Evaluation      Multimodal analgesia: multimodal analgesia used between 6 hours prior to anesthesia start to PACU discharge  Patient location during evaluation: bedside  Patient participation: complete - patient participated  Level of consciousness: awake  Pain management: adequate  Airway patency: patent  Anesthetic complications: no  Cardiovascular status: stable  Respiratory status: acceptable  Hydration status: acceptable  Post anesthesia nausea and vomiting:  controlled      INITIAL Post-op Vital signs:   Vitals Value Taken Time   /47 10/05/21 1505   Temp 36.4 °C (97.6 °F) 10/05/21 1505   Pulse 88 10/05/21 1505   Resp 16 10/05/21 1525   SpO2 100 % 10/05/21 1505

## 2021-10-05 NOTE — INTERVAL H&P NOTE
Update History & Physical    The Patient's History and Physical of September 21,   Progress was reviewed with the patient and I examined the patient. There was no change. The surgical site was confirmed by the patient and me. Plan:  The risk, benefits, expected outcome, and alternative to the recommended procedure have been discussed with the patient. Patient understands and wants to proceed with the procedure.     Electronically signed by Lan Chen MD on 10/5/2021 at 1:28 PM

## 2021-10-05 NOTE — DISCHARGE INSTRUCTIONS
Discharge Instructions          ADDITIONAL INFORMATION:   You have indwelling ureteral stents which frequently causes slight discomfort in the flank region and bladder spasms. If you are bothered by these symptoms, please contact our office and we can presribe you flomax as needed for flank discomfort or Ditropan for bladder spasms. The indwelling stent will need to be removed/exchanged as directed below. If the stent is left in place without appropriate follow-up, it may become encrusted with stone and/or infected. If that occurs, you will require multiple additional surgeries to fix this. It is very important you follow up for appropriate removal or exchange of ureteral stents. If you experience any fevers > 100.4, significant nausea/vomiting, or significant worsening of pain, please contact us at the number below. It is common to experience mild, recurrent blood in your urine and this is likely due to stent irritation. If the bleeding continues to worsen with passage of clots, you are unable to urinate, or you experience significant light-headedness, please contact us at the number below. FOLLOW UP CARE:  Next stent exchange will be in 6 months     Patient Education        Cystoscopy: What to Expect at [facility]     A cystoscopy is a procedure that lets a doctor look inside of the bladder and the urethra. The urethra is the tube that carries urine from the bladder to outside the body. The doctor uses a thin, lighted tool called a cystoscope. Your bladder is filled with fluid. This stretches the bladder so that your doctor can look closely at the inside of your bladder. After the cystoscopy, your urethra may be sore at first, and it may burn when you urinate for the first few days after the procedure. You may feel the need to urinate more often, and your urine may be pink. These symptoms should get better in 1 or 2 days.  You will probably be able to go back to most of your usual activities in 1 or 2 days. This care sheet gives you a general idea about how long it will take for you to recover. But each person recovers at a different pace. Follow the steps below to get better as quickly as possible. How can you care for yourself at home? Activity    · Rest when you feel tired. Getting enough sleep will help you recover.     · Try to walk each day. Start by walking a little more than you did the day before. Bit by bit, increase the amount you walk. Walking boosts blood flow and helps prevent pneumonia and constipation.     · Avoid strenuous activities, such as bicycle riding, jogging, weight lifting, or aerobic exercise, until your doctor says it is okay.     · Ask your doctor when you can drive again.     · Most people are able to return to work within 1 or 2 days after the procedure.     · You may shower and take baths as usual.     · Ask your doctor when it is okay for you to have sex. Diet    · You can eat your normal diet. If your stomach is upset, try bland, low-fat foods like plain rice, broiled chicken, toast, and yogurt.     · Drink plenty of fluids (unless your doctor tells you not to). Medicines    · Take pain medicines exactly as directed. ? If the doctor gave you a prescription medicine for pain, take it as prescribed. ? If you are not taking a prescription pain medicine, ask your doctor if you can take an over-the-counter medicine.     · If you think your pain medicine is making you sick to your stomach:  ? Take your medicine after meals (unless your doctor has told you not to). ? Ask your doctor for a different pain medicine.     · If your doctor prescribed antibiotics, take them as directed. Do not stop taking them just because you feel better. You need to take the full course of antibiotics. Follow-up care is a key part of your treatment and safety. Be sure to make and go to all appointments, and call your doctor if you are having problems.  It's also a good idea to know your test results and keep a list of the medicines you take. When should you call for help? Call 911 anytime you think you may need emergency care. For example, call if:    · You passed out (lost consciousness).     · You have severe trouble breathing.     · You have sudden chest pain and shortness of breath, or you cough up blood.     · You have severe belly pain. Call your doctor now or seek immediate medical care if:    · You are sick to your stomach or cannot keep fluids down.     · Your urine is still red or you see blood clots after you have urinated several times.     · You have trouble passing urine or stool, especially if you have pain or swelling in your lower belly.     · You have signs of a blood clot, such as:  ? Pain in your calf, back of the knee, thigh, or groin. ? Redness and swelling in your leg or groin.     · You develop a fever or severe chills.     · You have pain in your back just below your rib cage. This is called flank pain. Watch closely for changes in your health, and be sure to contact your doctor if:    · You have pain or burning when you urinate. A burning feeling is normal for a day or two after the test, but call if it does not get better.     · You have a frequent urge to urinate but can pass only small amounts of urine.     · Your urine is pink, red, or cloudy, or smells bad. It is normal for the urine to have a pinkish color for a few days after the test, but call if it does not get better. Where can you learn more? Go to http://www.gray.com/  Enter C842 in the search box to learn more about \"Cystoscopy: What to Expect at Home. \"  Current as of: February 10, 2021               Content Version: 13.0  © 9895-6542 [employer]. Care instructions adapted under license by [employer] (which disclaims liability or warranty for this information).  If you have questions about a medical condition or this instruction, always ask your healthcare professional. [place] [address] any warranty or liability for your use of this information. DISCHARGE SUMMARY from Nurse    PATIENT INSTRUCTIONS:    After general anesthesia or intravenous sedation, for 24 hours or while taking prescription Narcotics:  · Limit your activities  · Do not drive and operate hazardous machinery  · Do not make important personal or business decisions  · Do  not drink alcoholic beverages  · If you have not urinated within 8 hours after discharge, please contact your surgeon on call.     Report the following to your surgeon:  · Excessive pain, swelling, redness or odor of or around the surgical area  · Temperature over 100.5  · Nausea and vomiting lasting longer than 4 hours or if unable to take medications  · Any signs of decreased circulation or nerve impairment to extremity: change in color, persistent  numbness, tingling, coldness or increase pain  · Any questions

## 2021-10-05 NOTE — OP NOTES
Operative Note  Patient: Karyle Milch               Sex: female             MRN: [id number]  YOB: 1945      Age:  64 y.o. Preoperative Diagnosis: Kidney stone [N20.0]  Postoperative Diagnosis:  Kidney stone [N20.0]  Surgeon: Jacqueline Ceja     Indication: This is a 65 y/o woman ESRD on HD still with significant urine production with right distal ureteral stricture managed with chronic stent last changed 6 months here today for cysto, stent exchange. On Abx for preop + culture. Procedure:    1) Cystoscopy  2) Retrograde pyelogram with interpretation  3) Right Ureteral stent exchange     Findings:    1). Cloudy urine in bladder, sent for culture   2). Retrograde pyelogram revealed moderate right hydro   3). Ureteral stent exchange without complication     Narrative of Events: The patient was brought to the operative suite. MAC was induced and preoperative antibiotics were administered. They were then placed in the dorsal lithotomy position and their external genitalia was prepped and draped in the usual fashion. A surgical timeout was performed confirming the patient's name, date of birth, laterality, and antibiotics. All were in agreement. A 22 [place] cystourethroscope was then inserted into the patients bladder. There was so me cloudy urine present sent for culture. Stent was grasped and kit out to the meatus. Wire advanced and stent removed. New 8x24 JJ stent placed with good culrs in bladder and renal pelvis. The bladder was drained and the patient was then awoken and transferred to the recovery room in stable condition. Estimated Blood Loss: <5CC     Anesthesia:  MAC                  Implants:   Implant Name Type Inv.  Item Serial No.  Lot No. LRB No. Used Action   Carlos Nurse DBL-PGTL [id number] -- Lloyd Ji - [id number]  Carlos Nurse DBL-PGTL [id number] -- [facility]_ [id number] Right 1 Implanted       Specimens:   ID Type Source Tests Collected by Time Destination   1 : BLADDER URINE FOR CULTURE Urine Bladder CULTURE, URINE Tamir Fisher MD 10/5/2021  2:45 PM Microbiology        Drains: [id number] JJ stent            Complications:  None           Plan:  1.  Return for next sten exchange in 6 months     Moises Lozada MD  10/5/2021

## 2021-10-05 NOTE — ANESTHESIA PREPROCEDURE EVALUATION
Relevant Problems   No relevant active problems       Anesthetic History     PONV          Review of Systems / Medical History  Patient summary reviewed and pertinent labs reviewed    Pulmonary  Within defined limits                 Neuro/Psych   Within defined limits           Cardiovascular    Hypertension: well controlled              Exercise tolerance: >4 METS     GI/Hepatic/Renal     GERD: well controlled    Renal disease: ESRD and dialysis       Endo/Other    Diabetes: well controlled, type 2  Hypothyroidism       Other Findings            Physical Exam    Airway  Mallampati: III  TM Distance: 4 - 6 cm  Neck ROM: decreased range of motion   Mouth opening: Diminished (comment)     Cardiovascular    Rhythm: regular  Rate: normal         Dental    Dentition: Poor dentition     Pulmonary  Breath sounds clear to auscultation               Abdominal  GI exam deferred       Other Findings            Anesthetic Plan    ASA: 4  Anesthesia type: MAC          Induction: Intravenous  Anesthetic plan and risks discussed with: Patient

## 2021-10-07 LAB
BACTERIA SPEC CULT: NORMAL
SERVICE CMNT-IMP: NORMAL

## 2021-10-08 ENCOUNTER — APPOINTMENT (OUTPATIENT)
Dept: GENERAL RADIOLOGY | Age: 76
DRG: 853 | End: 2021-10-08
Attending: STUDENT IN AN ORGANIZED HEALTH CARE EDUCATION/TRAINING PROGRAM
Payer: MEDICARE

## 2021-10-08 ENCOUNTER — HOSPITAL ENCOUNTER (INPATIENT)
Age: 76
LOS: 6 days | Discharge: HOME | DRG: 853 | End: 2021-10-14
Attending: EMERGENCY MEDICINE | Admitting: INTERNAL MEDICINE
Payer: MEDICARE

## 2021-10-08 ENCOUNTER — APPOINTMENT (OUTPATIENT)
Dept: CT IMAGING | Age: 76
DRG: 853 | End: 2021-10-08
Attending: EMERGENCY MEDICINE
Payer: MEDICARE

## 2021-10-08 DIAGNOSIS — N18.6 ESRD (END STAGE RENAL DISEASE) (HCC): ICD-10-CM

## 2021-10-08 DIAGNOSIS — N18.5 CKD (CHRONIC KIDNEY DISEASE) STAGE 5, GFR LESS THAN 15 ML/MIN (HCC): ICD-10-CM

## 2021-10-08 DIAGNOSIS — A41.9 SEPTIC SHOCK (HCC): ICD-10-CM

## 2021-10-08 DIAGNOSIS — R65.21 SEPSIS WITH ACUTE ORGAN DYSFUNCTION AND SEPTIC SHOCK, DUE TO UNSPECIFIED ORGANISM, UNSPECIFIED TYPE (HCC): Primary | ICD-10-CM

## 2021-10-08 DIAGNOSIS — I48.4 ATYPICAL ATRIAL FLUTTER (HCC): ICD-10-CM

## 2021-10-08 DIAGNOSIS — E11.43 TYPE 2 DIABETES MELLITUS WITH DIABETIC AUTONOMIC NEUROPATHY, WITHOUT LONG-TERM CURRENT USE OF INSULIN (HCC): ICD-10-CM

## 2021-10-08 DIAGNOSIS — R65.21 SEPTIC SHOCK (HCC): ICD-10-CM

## 2021-10-08 DIAGNOSIS — A41.9 SEPSIS WITH ACUTE ORGAN DYSFUNCTION AND SEPTIC SHOCK, DUE TO UNSPECIFIED ORGANISM, UNSPECIFIED TYPE (HCC): Primary | ICD-10-CM

## 2021-10-08 LAB
ALBUMIN SERPL-MCNC: 3.1 G/DL (ref 3.4–5)
ALBUMIN/GLOB SERPL: 0.6 {RATIO} (ref 0.8–1.7)
ALP SERPL-CCNC: 139 U/L (ref 45–117)
ALT SERPL-CCNC: 12 U/L (ref 13–56)
ANION GAP SERPL CALC-SCNC: 6 MMOL/L (ref 3–18)
AST SERPL-CCNC: 17 U/L (ref 10–38)
BASOPHILS # BLD: 0.1 K/UL (ref 0–0.1)
BASOPHILS NFR BLD: 1 % (ref 0–2)
BILIRUB SERPL-MCNC: 0.4 MG/DL (ref 0.2–1)
BNP SERPL-MCNC: 5643 PG/ML (ref 0–1800)
BUN SERPL-MCNC: 13 MG/DL (ref 7–18)
BUN/CREAT SERPL: 4 (ref 12–20)
CALCIUM SERPL-MCNC: 8.7 MG/DL (ref 8.5–10.1)
CHLORIDE SERPL-SCNC: 97 MMOL/L (ref 100–111)
CO2 SERPL-SCNC: 32 MMOL/L (ref 21–32)
CREAT SERPL-MCNC: 3.36 MG/DL (ref 0.6–1.3)
DIFFERENTIAL METHOD BLD: ABNORMAL
EOSINOPHIL # BLD: 0.1 K/UL (ref 0–0.4)
EOSINOPHIL NFR BLD: 1 % (ref 0–5)
ERYTHROCYTE [DISTWIDTH] IN BLOOD BY AUTOMATED COUNT: 15.9 % (ref 11.6–14.5)
EST. AVERAGE GLUCOSE BLD GHB EST-MCNC: 85 MG/DL
GLOBULIN SER CALC-MCNC: 4.8 G/DL (ref 2–4)
GLUCOSE SERPL-MCNC: 144 MG/DL (ref 74–99)
HBA1C MFR BLD: 4.6 % (ref 4.2–5.6)
HCT VFR BLD AUTO: 34.2 % (ref 35–45)
HGB BLD-MCNC: 10.9 G/DL (ref 12–16)
INR PPP: 1.1 (ref 0.8–1.2)
LACTATE BLD-SCNC: 1.5 MMOL/L (ref 0.4–2)
LACTATE BLD-SCNC: 2.91 MMOL/L (ref 0.4–2)
LYMPHOCYTES # BLD: 1.2 K/UL (ref 0.9–3.6)
LYMPHOCYTES NFR BLD: 15 % (ref 21–52)
MAGNESIUM SERPL-MCNC: 2.1 MG/DL (ref 1.6–2.6)
MCH RBC QN AUTO: 30.1 PG (ref 24–34)
MCHC RBC AUTO-ENTMCNC: 31.9 G/DL (ref 31–37)
MCV RBC AUTO: 94.5 FL (ref 78–100)
MONOCYTES # BLD: 0.8 K/UL (ref 0.05–1.2)
MONOCYTES NFR BLD: 10 % (ref 3–10)
NEUTS SEG # BLD: 5.9 K/UL (ref 1.8–8)
NEUTS SEG NFR BLD: 74 % (ref 40–73)
PLATELET # BLD AUTO: 247 K/UL (ref 135–420)
PMV BLD AUTO: 10.1 FL (ref 9.2–11.8)
POTASSIUM SERPL-SCNC: 3.3 MMOL/L (ref 3.5–5.5)
PROCALCITONIN SERPL-MCNC: 0.42 NG/ML
PROT SERPL-MCNC: 7.9 G/DL (ref 6.4–8.2)
PROTHROMBIN TIME: 13.6 SEC (ref 11.5–15.2)
RBC # BLD AUTO: 3.62 M/UL (ref 4.2–5.3)
SODIUM SERPL-SCNC: 135 MMOL/L (ref 136–145)
TROPONIN I SERPL-MCNC: <0.02 NG/ML (ref 0–0.04)
TSH SERPL DL<=0.05 MIU/L-ACNC: 2.5 UIU/ML (ref 0.36–3.74)
WBC # BLD AUTO: 7.9 K/UL (ref 4.6–13.2)

## 2021-10-08 PROCEDURE — 99291 CRITICAL CARE FIRST HOUR: CPT | Performed by: PHYSICIAN ASSISTANT

## 2021-10-08 PROCEDURE — 75810000455 HC PLCMT CENT VENOUS CATH LVL 2 5182

## 2021-10-08 PROCEDURE — 83880 ASSAY OF NATRIURETIC PEPTIDE: CPT

## 2021-10-08 PROCEDURE — 83036 HEMOGLOBIN GLYCOSYLATED A1C: CPT

## 2021-10-08 PROCEDURE — 65270000029 HC RM PRIVATE

## 2021-10-08 PROCEDURE — 74018 RADEX ABDOMEN 1 VIEW: CPT

## 2021-10-08 PROCEDURE — 85025 COMPLETE CBC W/AUTO DIFF WBC: CPT

## 2021-10-08 PROCEDURE — 83605 ASSAY OF LACTIC ACID: CPT

## 2021-10-08 PROCEDURE — 96366 THER/PROPH/DIAG IV INF ADDON: CPT

## 2021-10-08 PROCEDURE — 80053 COMPREHEN METABOLIC PANEL: CPT

## 2021-10-08 PROCEDURE — 81001 URINALYSIS AUTO W/SCOPE: CPT

## 2021-10-08 PROCEDURE — 87106 FUNGI IDENTIFICATION YEAST: CPT

## 2021-10-08 PROCEDURE — 87186 SC STD MICRODIL/AGAR DIL: CPT

## 2021-10-08 PROCEDURE — 84145 PROCALCITONIN (PCT): CPT

## 2021-10-08 PROCEDURE — 96375 TX/PRO/DX INJ NEW DRUG ADDON: CPT

## 2021-10-08 PROCEDURE — 99285 EMERGENCY DEPT VISIT HI MDM: CPT

## 2021-10-08 PROCEDURE — 74011250637 HC RX REV CODE- 250/637: Performed by: STUDENT IN AN ORGANIZED HEALTH CARE EDUCATION/TRAINING PROGRAM

## 2021-10-08 PROCEDURE — 87077 CULTURE AEROBIC IDENTIFY: CPT

## 2021-10-08 PROCEDURE — 84484 ASSAY OF TROPONIN QUANT: CPT

## 2021-10-08 PROCEDURE — 74011000250 HC RX REV CODE- 250: Performed by: STUDENT IN AN ORGANIZED HEALTH CARE EDUCATION/TRAINING PROGRAM

## 2021-10-08 PROCEDURE — 74011250636 HC RX REV CODE- 250/636: Performed by: STUDENT IN AN ORGANIZED HEALTH CARE EDUCATION/TRAINING PROGRAM

## 2021-10-08 PROCEDURE — 71045 X-RAY EXAM CHEST 1 VIEW: CPT

## 2021-10-08 PROCEDURE — 85610 PROTHROMBIN TIME: CPT

## 2021-10-08 PROCEDURE — 93005 ELECTROCARDIOGRAM TRACING: CPT

## 2021-10-08 PROCEDURE — 84443 ASSAY THYROID STIM HORMONE: CPT

## 2021-10-08 PROCEDURE — 87040 BLOOD CULTURE FOR BACTERIA: CPT

## 2021-10-08 PROCEDURE — 83735 ASSAY OF MAGNESIUM: CPT

## 2021-10-08 PROCEDURE — 74011000258 HC RX REV CODE- 258: Performed by: STUDENT IN AN ORGANIZED HEALTH CARE EDUCATION/TRAINING PROGRAM

## 2021-10-08 PROCEDURE — 74011250636 HC RX REV CODE- 250/636: Performed by: EMERGENCY MEDICINE

## 2021-10-08 PROCEDURE — 96365 THER/PROPH/DIAG IV INF INIT: CPT

## 2021-10-08 PROCEDURE — 87086 URINE CULTURE/COLONY COUNT: CPT

## 2021-10-08 PROCEDURE — 70450 CT HEAD/BRAIN W/O DYE: CPT

## 2021-10-08 RX ORDER — NOREPINEPHRINE BIT/0.9 % NACL 8 MG/250ML
.5-3 INFUSION BOTTLE (ML) INTRAVENOUS
Status: DISCONTINUED | OUTPATIENT
Start: 2021-10-08 | End: 2021-10-08

## 2021-10-08 RX ORDER — INSULIN LISPRO 100 [IU]/ML
INJECTION, SOLUTION INTRAVENOUS; SUBCUTANEOUS EVERY 6 HOURS
Status: DISCONTINUED | OUTPATIENT
Start: 2021-10-08 | End: 2021-10-12

## 2021-10-08 RX ORDER — DEXTROSE 50 % IN WATER (D50W) INTRAVENOUS SYRINGE
25-50 AS NEEDED
Status: DISCONTINUED | OUTPATIENT
Start: 2021-10-08 | End: 2021-10-14 | Stop reason: HOSPADM

## 2021-10-08 RX ORDER — HEPARIN SODIUM 5000 [USP'U]/ML
5000 INJECTION, SOLUTION INTRAVENOUS; SUBCUTANEOUS EVERY 8 HOURS
Status: DISCONTINUED | OUTPATIENT
Start: 2021-10-08 | End: 2021-10-13

## 2021-10-08 RX ORDER — MAGNESIUM SULFATE 100 %
4 CRYSTALS MISCELLANEOUS AS NEEDED
Status: DISCONTINUED | OUTPATIENT
Start: 2021-10-08 | End: 2021-10-14 | Stop reason: HOSPADM

## 2021-10-08 RX ORDER — NOREPINEPHRINE BITARTRATE/D5W 8 MG/250ML
.5-3 PLASTIC BAG, INJECTION (ML) INTRAVENOUS
Status: DISCONTINUED | OUTPATIENT
Start: 2021-10-08 | End: 2021-10-09

## 2021-10-08 RX ORDER — MIDODRINE HYDROCHLORIDE 5 MG/1
5 TABLET ORAL
Status: DISCONTINUED | OUTPATIENT
Start: 2021-10-09 | End: 2021-10-10

## 2021-10-08 RX ORDER — VANCOMYCIN 2 GRAM/500 ML IN 0.9 % SODIUM CHLORIDE INTRAVENOUS
2000 ONCE
Status: COMPLETED | OUTPATIENT
Start: 2021-10-08 | End: 2021-10-08

## 2021-10-08 RX ORDER — ACETAMINOPHEN 325 MG/1
975 TABLET ORAL ONCE
Status: COMPLETED | OUTPATIENT
Start: 2021-10-08 | End: 2021-10-08

## 2021-10-08 RX ORDER — ONDANSETRON 2 MG/ML
4 INJECTION INTRAMUSCULAR; INTRAVENOUS
Status: COMPLETED | OUTPATIENT
Start: 2021-10-08 | End: 2021-10-08

## 2021-10-08 RX ADMIN — MEROPENEM 500 MG: 500 INJECTION INTRAVENOUS at 16:53

## 2021-10-08 RX ADMIN — VANCOMYCIN HYDROCHLORIDE 2000 MG: 10 INJECTION, POWDER, LYOPHILIZED, FOR SOLUTION INTRAVENOUS at 16:52

## 2021-10-08 RX ADMIN — DEXTROSE MONOHYDRATE 0.5 MCG/MIN: 50 INJECTION, SOLUTION INTRAVENOUS at 16:55

## 2021-10-08 RX ADMIN — SODIUM CHLORIDE 250 ML: 9 INJECTION, SOLUTION INTRAVENOUS at 16:50

## 2021-10-08 RX ADMIN — ONDANSETRON 4 MG: 2 INJECTION INTRAMUSCULAR; INTRAVENOUS at 16:53

## 2021-10-08 RX ADMIN — ACETAMINOPHEN 975 MG: 325 TABLET ORAL at 21:46

## 2021-10-08 NOTE — Clinical Note
Status[de-identified] INPATIENT [101]   Type of Bed: Intensive Care [6]   Inpatient Hospitalization Certified Necessary for the Following Reasons: 4.  Patient requires ICU level of care interventions (further clarification in H&P documentation)   Admitting Diagnosis: Septic shock [facility]) [[id number]]   Admitting Physician: Osiel Thompson   Attending Physician: Janis Smith [[id number]]   Estimated Length of Stay: 3-4 Midnights   Discharge Plan[de-identified] Other (Specify)

## 2021-10-08 NOTE — ED PROVIDER NOTES
[facility]  Emergency Department Treatment Report        Patient: Lulú Nunez Age: 64 y.o. Sex: female    YOB: 1945 Admit Date: 10/8/2021 PCP: Amos Negro MD   MRN: [id number]  CSN: [id number]     Room: 14/ Time Dictated: 2:51 PM      Chief Complaint   Chief Complaint   Patient presents with    Hypotension    Nausea       History of Present Illness   64 y.o. female with past medical history of end-stage renal disease on Monday Wednesday Friday dialysis, hypertension, ureter stricture, recurrent UTIs diabetes presenting with chief complaint of hypotension and dizziness. On chart review patient recently on October 5 underwent a right distal ureteral stricture management with stent change in cystography. She was on antibiotics for a positive preop urine culture, unable to see that urine culture was. While she is severely oliguric she does still produce urine. She reports that she normally has some lightheaded and dizziness, however is significantly worsened over the past 2 to 3 days. States that she becomes severely lightheaded and dizzy whenever she stands up from sitting in a chair, very tachycardic and has palpitations or difficulty breathing. This last for a few minutes until it resolves after rest.  She went to dialysis today had a normal run of dialysis, full run, 2 pounds taken off which is her normal and there is been no recent changes in the fluid amount had been taken off. She felt so weak that she could not get out of chair and EMS was called. When EMS arrived she was found to be hypotensive with 09Y systolic in the field. She denies chest pain reports palpitation, reports dyspnea on exertion has been worsening over the past few days. Denies any abdominal pain nausea vomiting or diarrhea. Denies any headaches. Denies any focal weakness. Review of Systems   Review of Systems   Constitutional: Positive for malaise/fatigue. Negative for chills and fever. HENT: Negative for congestion. Eyes: Negative for blurred vision. Respiratory: Positive for shortness of breath. Cardiovascular: Positive for palpitations. Negative for chest pain and leg swelling. Gastrointestinal: Positive for nausea. Negative for abdominal pain and vomiting. Genitourinary: Negative for dysuria. Musculoskeletal: Negative for back pain and neck pain. Neurological: Positive for weakness. Negative for sensory change and loss of consciousness.        Past Medical/Surgical History     Past Medical History:   Diagnosis Date    Acidosis     Anemia     Arteriovenous fistula (HCC)     Chronic kidney disease     on HD at [address] [facility] on MWF. [phone]    Chronic pain     CKD (chronic kidney disease)     Diabetes ([facility] [address]     no meds now    ESRD (end stage renal disease) on dialysis ([facility] [address] 9/9/2021    HLD (hyperlipidemia)     HTN (hypertension)     Hyperparathyroidism due to renal insufficiency ([facility] [address]     Hypothyroid     Kidney stone     Lung mass     Recurrent UTI     Ureter, stricture     Uric acid nephrolithiasis     Urinary incontinence      Past Surgical History:   Procedure Laterality Date    HX APPENDECTOMY      HX CHOLECYSTECTOMY      HX GASTRIC BYPASS      Gastric stapling    HX KNEE ARTHROSCOPY      HX UROLOGICAL      right PCN placement    HX UROLOGICAL  07/23/2018    RIGHT URETEROSCOPY WITH HOLMIUM LASER    IR EXCHANGE NEPHRO PERC LT SI  2/21/2020    IR EXCHANGE NEPHRO PERC RT SI  4/13/2020    IR EXCHANGE NEPHRO PERC RT SI  7/17/2020    IR NEPHROSTOMY PERC RT PLC CATH  SI  10/14/2020    IR NEPHROURETERAL PERC RT PLC CATH NEW ACCESS  SI  4/30/2020    CT INTRO CATH DIALYSIS CIRCUIT DX ANGRPH FLUOR S&I Left 9/24/2020    FISTULOGRAM LEFT/poss permanent catheter placement performed by Josseline Loera MD at [facility] CATH LAB    VASCULAR SURGERY PROCEDURE UNLIST      lef AVF       Social History     Social History     Socioeconomic History    Marital status: SINGLE     Spouse name: Not on file    Number of children: Not on file    Years of education: Not on file    Highest education level: Not on file   Occupational History    Not on file   Tobacco Use    Smoking status: Never Smoker    Smokeless tobacco: Never Used   Vaping Use    Vaping Use: Never used   Substance and Sexual Activity    Alcohol use: Never    Drug use: Never    Sexual activity: Not on file   Other Topics Concern    Not on file   Social History Narrative    Not on file     Social Determinants of Health     Financial Resource Strain:     Difficulty of Paying Living Expenses:    Food Insecurity:     Worried About Running Out of Food in the Last Year:     [address] in the Last Year:    Transportation Needs:     Lack of Transportation (Medical):  Lack of Transportation (Non-Medical):    Physical Activity:     Days of Exercise per Week:     Minutes of Exercise per Session:    Stress:     Feeling of Stress :    Social Connections:     Frequency of Communication with Friends and Family:     Frequency of Social Gatherings with Friends and Family:     Attends Jew Services:     Active Member of Clubs or Organizations:     Attends Club or Organization Meetings:     Marital Status:    Intimate Partner Violence:     Fear of Current or Ex-Partner:     Emotionally Abused:     Physically Abused:     Sexually Abused:        Family History     Family History   Problem Relation Age of Onset    Heart Surgery Sister        Current Medications     Prior to Admission Medications   Prescriptions Last Dose Informant Patient Reported? Taking? DULoxetine (Cymbalta) 20 mg capsule   Yes No   Sig: Take 20 mg by mouth daily. HYDROmorphone (DILAUDID) 2 mg tablet   Yes No   Sig: as needed. Narcan 4 mg/actuation nasal spray   Yes No   acetaminophen (TYLENOL) 325 mg tablet   Yes No   Sig: Take 650 mg by mouth two (2) times a day.    allopurinoL (ZYLOPRIM) 100 mg tablet   Yes No Sig: Take 200 mg by mouth daily. ascorbic acid, vitamin C, (VITAMIN C) 500 mg tablet   Yes No   Sig: Take 500 mg by mouth daily. b complex vitamins (Vitamins B Complex) tablet   Yes No   Sig: Take 1 Tab by mouth daily. biotin 1,000 mcg chew   Yes No   Sig: Take 1 Tab by mouth daily. calcitRIOL (ROCALTROL) 0.25 mcg capsule   Yes No   Sig: Take 0.25 mcg by mouth daily. cholecalciferol (VITAMIN D3) (2,000 UNITS /50 MCG) cap capsule   Yes No   Sig: Take 2,000 Units by mouth two (2) times a day. Take two tabs a total of 4000 units   cyanocobalamin 1,000 mcg tablet   Yes No   Sig: Take 1,000 mcg by mouth daily. doxercalciferol (HECTOROL IV)   Yes No   Si mcg.   estradioL (Estrace) 0.01 % (0.1 mg/gram) vaginal cream   No No   Sig: Apply a fingertip amount around the urethra three times a week. fludrocortisone (FLORINEF) 0.1 mg tablet   No No   Sig: Take 1 Tab by mouth daily. Patient not taking: Reported on 10/1/2021   gabapentin (NEURONTIN) 100 mg capsule   Yes No   Sig: Take 100 mg by mouth nightly. AS needed   heparin sod,porcine/0.9 % NaCl (heparin flush, porcine, in ns) 100 unit/mL kit   Yes No   Sig: Heparin Sodium (Porcine) 1,000 Units/mL Systemic   lactobacillus sp. 50 billion cpu (BIO-K PLUS) 50 billion cell -375 mg cap capsule   No No   Sig: Take 1 Cap by mouth daily. latanoprost (XALATAN) 0.005 % ophthalmic solution   Yes No   Sig: Administer 1 Drop to both eyes nightly. One drop at bedtime   levothyroxine (SYNTHROID) 125 mcg tablet   Yes No   Sig: Take 125 mcg by mouth Daily (before breakfast). meclizine (ANTIVERT) 25 mg tablet   Yes No   Sig: Take 25 mg by mouth as needed. methoxy peg-epoetin beta (MIRCERA INJECTION)   Yes No   Si mcg.   midodrine (PROAMATINE) 2.5 mg tablet   Yes No   Sig: Take 1 Tablet by mouth three (3) times daily (with meals). omeprazole (PRILOSEC) 20 mg capsule   Yes No   Sig: Take 20 mg by mouth daily.    ondansetron (ZOFRAN ODT) 4 mg disintegrating tablet   Yes No   Sig: Take 4 mg by mouth every eight (8) hours as needed for Nausea or Vomiting.   sodium bicarbonate 650 mg tablet   No No   Sig: Take 2 Tabs by mouth three (3) times daily. Indications: excess body acid   Patient not taking: Reported on 10/1/2021   tamsulosin (FLOMAX) 0.4 mg capsule   No No   Sig: Take 1 Cap by mouth daily. vit B Cmplx 3-FA-Vit C-Biotin (NEPHRO HOWIE RX) 1- mg-mg-mcg tablet   Yes No   Sig: Take 1 Tab by mouth daily. vitamin B complex (B COMPLEX VITAMINS PO)   Yes No   Sig: Take 1 Tablet by mouth daily. vitamin D3-folic acid 5,746 unit- 1 mg tab   Yes No   Sig: Take 1 Tablet by mouth. Facility-Administered Medications: None       Allergies     Allergies   Allergen Reactions    Ciprofloxacin Hives    Cyclopentolate Unknown (comments)    Iron Sucrose Diarrhea    Midodrine Unknown (comments)     Denies 910/1/21    Statins-Hmg-Coa Reductase Inhibitors Other (comments)     Body ache       Physical Exam     ED Triage Vitals [10/08/21 1447]   ED Encounter Vitals Group      BP (!) 82/39      Pulse (Heart Rate) 66      Resp Rate 16      Temp 97.6 °F (36.4 °C)      Temp src       O2 Sat (%) 98 %      Weight       Height         Physical Exam  Vitals and nursing note reviewed. Constitutional:       Appearance: She is obese. She is ill-appearing. Eyes:      Extraocular Movements: Extraocular movements intact. Pupils: Pupils are equal, round, and reactive to light. Comments: No nystagmus noted while symptomatic. Cardiovascular:      Rate and Rhythm: Regular rhythm. Tachycardia present. Pulses: Normal pulses. Heart sounds: Normal heart sounds. Pulmonary:      Effort: Pulmonary effort is normal.      Breath sounds: Normal breath sounds. Abdominal:      General: Abdomen is flat. Tenderness: There is no abdominal tenderness. Musculoskeletal:      Cervical back: Normal range of motion. Skin:     General: Skin is warm.       Capillary Refill: Capillary refill takes less than 2 seconds. Neurological:      Mental Status: She is alert. GCS: GCS eye subscore is 4. GCS verbal subscore is 5. GCS motor subscore is 6. Cranial Nerves: Cranial nerves are intact. No dysarthria or facial asymmetry. Sensory: Sensation is intact. Motor: No weakness. Coordination: Finger-Nose-Finger Test normal.          Diagnostic Studies   Lab:   Recent Results (from the past 12 hour(s))   CBC WITH AUTOMATED DIFF    Collection Time: 10/08/21  3:50 PM   Result Value Ref Range    WBC 7.9 4.6 - 13.2 K/uL    RBC 3.62 (L) 4.20 - 5.30 M/uL    HGB 10.9 (L) 12.0 - 16.0 g/dL    HCT 34.2 (L) 35.0 - 45.0 %    MCV 94.5 78.0 - 100.0 FL    MCH 30.1 24.0 - 34.0 PG    MCHC 31.9 31.0 - 37.0 g/dL    RDW 15.9 (H) 11.6 - 14.5 %    PLATELET 612 986 - 541 K/uL    MPV 10.1 9.2 - 11.8 FL    NEUTROPHILS 74 (H) 40 - 73 %    LYMPHOCYTES 15 (L) 21 - 52 %    MONOCYTES 10 3 - 10 %    EOSINOPHILS 1 0 - 5 %    BASOPHILS 1 0 - 2 %    ABS. NEUTROPHILS 5.9 1.8 - 8.0 K/UL    ABS. LYMPHOCYTES 1.2 0.9 - 3.6 K/UL    ABS. MONOCYTES 0.8 0.05 - 1.2 K/UL    ABS. EOSINOPHILS 0.1 0.0 - 0.4 K/UL    ABS.  BASOPHILS 0.1 0.0 - 0.1 K/UL    DF AUTOMATED     PROTHROMBIN TIME + INR    Collection Time: 10/08/21  3:50 PM   Result Value Ref Range    Prothrombin time 13.6 11.5 - 15.2 sec    INR 1.1 0.8 - 1.2     METABOLIC PANEL, COMPREHENSIVE    Collection Time: 10/08/21  3:50 PM   Result Value Ref Range    Sodium 135 (L) 136 - 145 mmol/L    Potassium 3.3 (L) 3.5 - 5.5 mmol/L    Chloride 97 (L) 100 - 111 mmol/L    CO2 32 21 - 32 mmol/L    Anion gap 6 3.0 - 18 mmol/L    Glucose 144 (H) 74 - 99 mg/dL    BUN 13 7.0 - 18 MG/DL    Creatinine 3.36 (H) 0.6 - 1.3 MG/DL    BUN/Creatinine ratio 4 (L) 12 - 20      GFR est AA 16 (L) >60 ml/min/1.73m2    GFR est non-AA 13 (L) >60 ml/min/1.73m2    Calcium 8.7 8.5 - 10.1 MG/DL    Bilirubin, total 0.4 0.2 - 1.0 MG/DL    ALT (SGPT) 12 (L) 13 - 56 U/L    AST (SGOT) 17 10 - 38 U/L Alk. phosphatase 139 (H) 45 - 117 U/L    Protein, total 7.9 6.4 - 8.2 g/dL    Albumin 3.1 (L) 3.4 - 5.0 g/dL    Globulin 4.8 (H) 2.0 - 4.0 g/dL    A-G Ratio 0.6 (L) 0.8 - 1.7     TROPONIN I    Collection Time: 10/08/21  3:50 PM   Result Value Ref Range    Troponin-I, QT <0.02 0.0 - 0.045 NG/ML   MAGNESIUM    Collection Time: 10/08/21  3:50 PM   Result Value Ref Range    Magnesium 2.1 1.6 - 2.6 mg/dL   NT-PRO BNP    Collection Time: 10/08/21  3:50 PM   Result Value Ref Range    NT pro-BNP 5,643 (H) 0 - 1,800 PG/ML   TSH 3RD GENERATION    Collection Time: 10/08/21  3:50 PM   Result Value Ref Range    TSH 2.50 0.36 - 3.74 uIU/mL   PROCALCITONIN    Collection Time: 10/08/21  3:50 PM   Result Value Ref Range    Procalcitonin 0.42 ng/mL   HEMOGLOBIN A1C WITH EAG    Collection Time: 10/08/21  3:50 PM   Result Value Ref Range    Hemoglobin A1c 4.6 4.2 - 5.6 %    Est. average glucose 85 mg/dL   POC LACTIC ACID    Collection Time: 10/08/21  4:02 PM   Result Value Ref Range    Lactic Acid (POC) 2.91 (HH) 0.40 - 2.00 mmol/L   POC LACTIC ACID    Collection Time: 10/08/21  7:42 PM   Result Value Ref Range    Lactic Acid (POC) 1.50 0.40 - 2.00 mmol/L     Labs Reviewed   CBC WITH AUTOMATED DIFF - Abnormal; Notable for the following components:       Result Value    RBC 3.62 (*)     HGB 10.9 (*)     HCT 34.2 (*)     RDW 15.9 (*)     NEUTROPHILS 74 (*)     LYMPHOCYTES 15 (*)     All other components within normal limits   METABOLIC PANEL, COMPREHENSIVE - Abnormal; Notable for the following components:    Sodium 135 (*)     Potassium 3.3 (*)     Chloride 97 (*)     Glucose 144 (*)     Creatinine 3.36 (*)     BUN/Creatinine ratio 4 (*)     GFR est AA 16 (*)     GFR est non-AA 13 (*)     ALT (SGPT) 12 (*)     Alk.  phosphatase 139 (*)     Albumin 3.1 (*)     Globulin 4.8 (*)     A-G Ratio 0.6 (*)     All other components within normal limits   NT-PRO BNP - Abnormal; Notable for the following components:    NT pro-BNP 5,643 (*)     All other components within normal limits   POC LACTIC ACID - Abnormal; Notable for the following components:    Lactic Acid (POC) 2.91 (*)     All other components within normal limits   CULTURE, BLOOD   CULTURE, BLOOD   CULTURE, URINE   RESPIRATORY VIRUS PANEL W/COVID-19, PCR   PROTHROMBIN TIME + INR   TROPONIN I   MAGNESIUM   TSH 3RD GENERATION   PROCALCITONIN   HEMOGLOBIN A1C WITH EAG   URINALYSIS W/ RFLX MICROSCOPIC   POC LACTIC ACID   POC CHEM8       Imaging:    CT HEAD WO CONT    Result Date: 10/8/2021  CT Head without contrast HISTORY: Hypotension, dizziness. COMPARISON: None TECHNIQUE:  Axial imaging from the skull base to the vertex was performed without intravenous contrast.  Coronal and sagittal reformations. All CT scans are performed using dose optimization techniques as appropriate to the performed exam including the following: Automated exposure control, adjustment of mA and/or kV according to patient size, and use of iterative reconstructive technique. FINDINGS: No intracranial hemorrhage. No evidence of midline shift, mass effect, or obvious mass lesion. There is preservation of the gray and white matter differentiation, no acute infarct (Please note that CT is less sensitive in the detection of early infarct). Moderately severe periventricular white matter changes of the cerebrum. The size of the ventricles and sulci are normal.  No extra axial fluid collections. Heavily calcified intracranial carotid arteries. Visualized paranasal sinuses are clear. No evidence for skull fracture. No CT finding for acute territorial infarct or acute intracranial hemorrhage. Fairly extensive periventricular white matter change. Nonspecific but often associated with chronic microvascular ischemic disease. XR CHEST PORT    Result Date: 10/8/2021  Portable CXR: HISTORY: Hypotension. Comparison June 18, 2021 Cardiac silhouette is normal in size. Ectatic aorta, stable. No vascular congestion.  Eventration right hemidiaphragm, stable. No consolidation or pleural effusion. No acute abnormalities. No pneumonia or CHF. EKG as interpreted by me shows normal sinus rhythm with a rate of 97, left axis deviation, normal intervals, no ST segment changes    ED Course/MDM     ED Course as of Oct 08 2154   Fri Oct 08, 2021   1528 HR into the 190s-200s when moving to the stretcher, appeared narrow and regular. Resolved to low 100s after resting on the stretcher    [CT]   1534 Repeat BP of 90/32, MAP 46. Establishing IV access. 250 cc bolus, if not improving BP, levo ordered    [CT]   1622 Blood pressure is now 110/73 not requiring any pressor support. [CT]   1820 MAP of 112 (123/108) while only on 0.5 mcg/min of levophed    [CT]   1822 Labs were reviewed, lactate of 2.91, troponin undetectable, hemoglobin 10.9 just around prior, platelets normal, potassium 3.3 no anion gap, elevated creatinine as to be SPECT with end-stage renal disease NT proBNP of 5600, no prior to compare to, however no evidence of pulmonary edema on chest x-ray. States that she is feeling better    [CT]   1858 Repeat BP fluctuating, more consistently in 44-03U systolic. Levophed increasing to 1 mcg/min    [CT]   1909 Discussed with Dr. Jennifer Guevara of ICU, will accept in admission. Requesting discussion with urology about possible need for CT a/p, will page now. [CT]   1922 Discussed with Dr. Merly Cartwright of UOV. Recommended KUB and renal US for evalution. [CT]   2124 Patient was requiring increasing levophed, to 5 mcgs. Mentation remained normal, Lactate cleared. Central line placed. [CT]      ED Course User Index  [CT] Loc Paul MD       Differential Diagnosis includes but is not limited to: Orthostatic, vasovagal, cardiac etiology, ACS, doubt PE. With a hypotension and recent antibiotic administration for urinary tract infection sepsis is also on the differential.  Perform full septic evaluation.   Giving empiric vancomycin and meropenem. She had recently completed a course of ciprofloxacin as well as Augmentin. However holding on large fluid boluses given her stage renal disease and almost anuric status. Central Line    Date/Time: 10/8/2021 9:24 PM  Performed by: Mami Noriega MD  Authorized by: Mami Noriega MD     Consent:     Consent obtained:  Written    Consent given by:  Patient    Risks discussed:  Arterial puncture, incorrect placement, nerve damage, bleeding, infection and pneumothorax    Alternatives discussed:  No treatment  Pre-procedure details:     Hand hygiene: Hand hygiene performed prior to insertion      Sterile barrier technique: All elements of maximal sterile technique followed      Skin preparation:  2% chlorhexidine    Skin preparation agent: Skin preparation agent completely dried prior to procedure    Anesthesia (see MAR for exact dosages): Anesthesia method:  Local infiltration    Local anesthetic:  Lidocaine 1% w/o epi  Procedure details:     Location:  R internal jugular    Patient position:  Trendelenburg    Procedural supplies:  Triple lumen    Catheter size:  7 Fr    Landmarks identified: yes      Ultrasound guidance: yes      Sterile ultrasound techniques: Sterile gel and sterile probe covers were used      Number of attempts:  2    Successful placement: yes    Post-procedure details:     Post-procedure:  Dressing applied and line sutured    Assessment:  Blood return through all ports, free fluid flow, no pneumothorax on x-ray and placement verified by x-ray    Patient tolerance of procedure: Tolerated well, no immediate complications  Comments:      First guidewire kinked at 15 cm. Second guidewire passed without difficulty, wire confirmed in IJ with US        Final Diagnosis       ICD-10-CM ICD-9-CM   1. Sepsis with acute organ dysfunction and septic shock, due to unspecified organism, unspecified type ([facility]  A41.9 038.9    R65.21 995.92     785.52   2.  ESRD (end stage renal disease) ([facility] [address]  N18.6 585. 6       Disposition   Admit-ICU    The patient was personally evaluated by myself and Dr. Arcenoi Loera who agrees with the above assessment and plan. Kody White M.D.  [facility]  October 8, 2021    My signature above authenticates this document and my orders, the final    diagnosis (es), discharge prescription (s), and instructions in the Epic    record. Nursing notes have been reviewed by the physician    [employer] medical dictation software was used for portions of this report. Unintended voice recognition errors may occur.

## 2021-10-08 NOTE — ED NOTES
Hypotensive s/p dialysis today. Gabriel Pretty is nephrologist.  Just finished round of ABX for UTI; makes 15cc urine daily. Hasn't voided today.  +dizzy today BEFORE dialysis. Meclizine not helping. I performed a brief evaluation, including history and physical, of the patient here in triage and I have determined that pt will need further treatment and evaluation from the main side ER physician. I have placed initial orders to help in expediting patients care.      October 08, 2021 at 2:50 PM - NOVA Martínez        Visit Vitals  BP (!) 82/39   Pulse 66   Temp 97.6 °F (36.4 °C)   Resp 16   SpO2 98%

## 2021-10-08 NOTE — H&P
[facility]  Pulmonary, Critical Care, and Sleep Medicine    Name: Jonna Fatima MRN: [id number]   : 1945 Hospital: [address] [place] [address] Dr   Date: 10/8/2021        Critical Care History and Physical      IMPRESSION:   · Shock - ?septic in the setting of recurrent UTI. Patient has R ureteral stricture managed with nephrostomy tube initially and converted to stent 6 mo ago. S/P exchange of chronic R ureteral stent on 10/5. · Lactic acidosis - resolved  · ESRD on HD - makes small amount of urine     Patient Active Problem List   Diagnosis Code    Nausea & vomiting R11.2    Pyelonephritis H41    Complicated urinary tract infection N39.0    Anemia of chronic renal failure N18.9, D63.1    Anemia D64.9    Hypotension I95.9    UTI (urinary tract infection) N39.0    Type 2 diabetes mellitus, without long-term current use of insulin ([facility]) E11.9    CKD (chronic kidney disease) stage 5, GFR less than 15 ml/min ([facility]) N18.5    Acquired hypothyroidism E03.9    GERD (gastroesophageal reflux disease) D79.2    Complicated UTI (urinary tract infection) N39.0    Disease of the lower urinary tract N39.9    Sepsis ([facility]) A41.9    Tachycardia R00.0    ESRD (end stage renal disease) on dialysis ([facility]) N18.6, Z99.2    Hydronephrosis N13.30    Septic shock ([facility]) A41.9, R65.21        RECOMMENDATIONS:   · Resp: stable, not requiring supplemental oxygen. Aspiration precautions. · I/D: obtain procalcitonin. Started on vanc and merrem empirically. Follow-up blood and urine Cx and de-escalate antibiotics as needed  · Hem/Onc: Daily CBC; H/H, and plts are stable  · CVS: continue low dose levophed  · Metabolic: Daily BMP; monitor e-lytes; replace PRN  · Renal: Trend Renal indices; HD per nephrology, monitor I/O. Follow-up with urology's recommendations for imagining  · Endocrine: POC Glucose q6;  SSI  · GI: SUP, Trend LFTs, Zofran PRN for N/V   · Musc/Skin: No acute issues, wound care as needed  · Neuro: PRN pain medications. Avoid sedating medications. · Fluids: n/a  · Code Status: full code     Best Practices/Safety Bundles:  · Sepsis Bundle per Hospital Protocol  · Glycemic control; avoid Hypoglycemia  · Stress Ulcer prophylaxis: n/a  · DVT prophylaxis: heparin  · Need for Lines, chua assessed. · Restraints Need:  · Not needed at this time    Subjective/History:     10/08/21    Patient is a 64 y.o. female w/ pmhx of ESRD on HD, R ureteral stricture with stent in place and frequent UTIs who presented to the ED complaining of lightheadedness onset yesterday. She reported that she went to dialysis as usual and had a normal run of HD. She reports when her lightheadedness did not improve she decided to come to the ED. She also admits to dyspnea on exertion. She denies chest pain, abdominal pain, vomiting, diarrhea or any other symptoms. On my evaluation patient A&O x4, in no acute distress. She states she feels fine as long as she is seated. BP stable on low dose levophed gtt.     Past Medical History:   Diagnosis Date    Acidosis     Anemia     Arteriovenous fistula (HCC)     Chronic kidney disease     on HD at [facility] on [address] on MWF. [phone]    Chronic pain     CKD (chronic kidney disease)     Diabetes ([address]     no meds now    ESRD (end stage renal disease) on dialysis ([address] 9/9/2021    HLD (hyperlipidemia)     HTN (hypertension)     Hyperparathyroidism due to renal insufficiency ([address]     Hypothyroid     Kidney stone     Lung mass     Recurrent UTI     Ureter, stricture     Uric acid nephrolithiasis     Urinary incontinence         Past Surgical History:   Procedure Laterality Date    HX APPENDECTOMY      HX CHOLECYSTECTOMY      HX GASTRIC BYPASS      Gastric stapling    HX KNEE ARTHROSCOPY      HX UROLOGICAL      right PCN placement    HX UROLOGICAL  07/23/2018    RIGHT URETEROSCOPY WITH HOLMIUM LASER    IR EXCHANGE NEPHRO PERC LT SI  2/21/2020    IR EXCHANGE NEPHRO PERC RT SI  4/13/2020    IR EXCHANGE NEPHRO PERC RT SI  7/17/2020    IR NEPHROSTOMY PERC RT PLC CATH  SI  10/14/2020    IR NEPHROURETERAL PERC RT PLC CATH NEW ACCESS  SI  4/30/2020    AZ INTRO CATH DIALYSIS CIRCUIT DX BRYNN FLUOR S&I Left 9/24/2020    FISTULOGRAM LEFT/poss permanent catheter placement performed by Alfonso Anna MD at [facility] CATH LAB   [address]      lef AVF        Prior to Admission medications    Medication Sig Start Date End Date Taking? Authorizing Provider   vitamin B complex (B COMPLEX VITAMINS PO) Take 1 Tablet by mouth daily. Provider, Historical   heparin sod,porcine/0.9 % NaCl (heparin flush, porcine, in ns) 100 unit/mL kit Heparin Sodium (Porcine) 1,000 Units/mL Systemic 10/9/20 7/1/22  Provider, Historical   doxercalciferol (HECTOROL IV) 4 mcg. 8/20/21 8/19/22  Provider, Historical   HYDROmorphone (DILAUDID) 2 mg tablet as needed. 9/16/21   Provider, Historical   meclizine (ANTIVERT) 25 mg tablet Take 25 mg by mouth as needed. 3/3/21   Provider, Historical   methoxy peg-epoetin beta [facility] INJECTION) 30 mcg. 9/20/21 9/19/22  Provider, Historical   Narcan 4 mg/actuation nasal spray  9/14/21   Provider, Historical   vitamin D3-folic acid 5,920 unit- 1 mg tab Take 1 Tablet by mouth. 10/19/20   Provider, Historical   midodrine (PROAMATINE) 2.5 mg tablet Take 1 Tablet by mouth three (3) times daily (with meals). 4/16/21   Provider, Historical   DULoxetine (Cymbalta) 20 mg capsule Take 20 mg by mouth daily. Provider, Historical   fludrocortisone (FLORINEF) 0.1 mg tablet Take 1 Tab by mouth daily. Patient not taking: Reported on 10/1/2021 1/27/21   Leona Aguilar MD   b complex vitamins (Vitamins B Complex) tablet Take 1 Tab by mouth daily. Provider, Historical   estradioL (Estrace) 0.01 % (0.1 mg/gram) vaginal cream Apply a fingertip amount around the urethra three times a week.  9/30/20   iMtesh Cruz MD   biotin 1,000 mcg chew Take 1 Tab by mouth daily. Provider, Historical   cyanocobalamin 1,000 mcg tablet Take 1,000 mcg by mouth daily. Provider, Historical   gabapentin (NEURONTIN) 100 mg capsule Take 100 mg by mouth nightly. AS needed    Provider, Historical   lactobacillus sp. 50 billion cpu (BIO-K PLUS) 50 billion cell -375 mg cap capsule Take 1 Cap by mouth daily. 2/25/20   Pattie Johnson MD   tamsulosin (FLOMAX) 0.4 mg capsule Take 1 Cap by mouth daily. 2/25/20   Pattie Johnson MD   sodium bicarbonate 650 mg tablet Take 2 Tabs by mouth three (3) times daily. Indications: excess body acid  Patient not taking: Reported on 10/1/2021 2/24/20   Pattie Johnson MD   acetaminophen (TYLENOL) 325 mg tablet Take 650 mg by mouth two (2) times a day. Lexus Hogan MD   allopurinoL (ZYLOPRIM) 100 mg tablet Take 200 mg by mouth daily. Lexus Hogan MD   ascorbic acid, vitamin C, (VITAMIN C) 500 mg tablet Take 500 mg by mouth daily. Lexus Hogan MD   calcitRIOL (ROCALTROL) 0.25 mcg capsule Take 0.25 mcg by mouth daily. Lexus Hogan MD   cholecalciferol (VITAMIN D3) (2,000 UNITS /50 MCG) cap capsule Take 2,000 Units by mouth two (2) times a day. Take two tabs a total of 4000 units    Lexus Hogan MD   latanoprost (XALATAN) 0.005 % ophthalmic solution Administer 1 Drop to both eyes nightly. One drop at bedtime    Lexus Hogan MD   levothyroxine (SYNTHROID) 125 mcg tablet Take 125 mcg by mouth Daily (before breakfast). Lexus Hogan MD   omeprazole (PRILOSEC) 20 mg capsule Take 20 mg by mouth daily. Lexus Hogan MD   ondansetron (ZOFRAN ODT) 4 mg disintegrating tablet Take 4 mg by mouth every eight (8) hours as needed for Nausea or Vomiting. Lexus Hogan MD   vit B Cmplx 3-FA-Vit C-Biotin (NEPHRO HOWIE RX) 1- mg-mg-mcg tablet Take 1 Tab by mouth daily.     Lexus Hogan MD       Current Facility-Administered Medications   Medication Dose Route Frequency    meropenem (MERREM) 500 mg in sterile water (preservative free) 20 mL IV syringe  500 mg IntraVENous Q24H    VANCOMYCIN INFORMATION NOTE   Other Rx Dosing/Monitoring    NOREPINephrine (LEVOPHED) 8 mg in 5% dextrose 250mL (32 mcg/mL) infusion  0.5-30 mcg/min IntraVENous TITRATE    [START ON 10/9/2021] midodrine (PROAMATINE) tablet 5 mg  5 mg Oral TID WITH MEALS       Allergies   Allergen Reactions    Ciprofloxacin Hives    Cyclopentolate Unknown (comments)    Iron Sucrose Diarrhea    Midodrine Unknown (comments)     Denies     Statins-Hmg-Coa Reductase Inhibitors Other (comments)     Body ache        Social History     Tobacco Use    Smoking status: Never Smoker    Smokeless tobacco: Never Used   Substance Use Topics    Alcohol use: Never        Family History   Problem Relation Age of Onset    Heart Surgery Sister           Review of Systems:  A comprehensive review of systems was negative except for that written in the HPI. Objective:   Vital Signs:    Visit Vitals  /77   Pulse 76   Temp 97.6 °F (36.4 °C)   Resp 18   Ht 5' 2\" (1.575 m)   Wt 94 kg (207 lb 3.7 oz)   SpO2 98%   BMI 37.90 kg/m²       O2 Device: None (Room air)       Temp (24hrs), Av.6 °F (36.4 °C), Min:97.6 °F (36.4 °C), Max:97.6 °F (36.4 °C)       Intake/Output:   Last shift:      No intake/output data recorded. Last 3 shifts: No intake/output data recorded. No intake or output data in the 24 hours ending 10/08/21 1959    Physical Exam:     General:  Alert, cooperative, NAD   Head:  Normocephalic, without obvious abnormality, atraumatic. Eyes:  Conjunctivae/corneas clear. PERRL   Nose: Nares normal. Septum midline. Mucosa normal. No drainage or sinus tenderness. Throat: Lips, mucosa, and tongue normal. Teeth and gums normal.   Neck: Supple, symmetrical, trachea midline, no adenopathy, no carotid bruit and no JVD. Lungs:   Symmetrical chest rise; good AE bilat; CTAB; no wheezes/rhonchi/rales noted. Heart:  RRR, S1, S2 normal, no m/r/g   Abdomen:   Soft, non-tender.  Bowel sounds normal. No masses,  No organomegaly. Extremities: Extremities normal, atraumatic, no cyanosis or edema. Pulses: 2+ and symmetric all extremities. Skin: Skin color, texture, turgor normal. Hives to R forearm   Neurologic: Grossly nonfocal   Devices: PIVs,        Data:     Recent Results (from the past 24 hour(s))   CBC WITH AUTOMATED DIFF    Collection Time: 10/08/21  3:50 PM   Result Value Ref Range    WBC 7.9 4.6 - 13.2 K/uL    RBC 3.62 (L) 4.20 - 5.30 M/uL    HGB 10.9 (L) 12.0 - 16.0 g/dL    HCT 34.2 (L) 35.0 - 45.0 %    MCV 94.5 78.0 - 100.0 FL    MCH 30.1 24.0 - 34.0 PG    MCHC 31.9 31.0 - 37.0 g/dL    RDW 15.9 (H) 11.6 - 14.5 %    PLATELET 533 477 - 798 K/uL    MPV 10.1 9.2 - 11.8 FL    NEUTROPHILS 74 (H) 40 - 73 %    LYMPHOCYTES 15 (L) 21 - 52 %    MONOCYTES 10 3 - 10 %    EOSINOPHILS 1 0 - 5 %    BASOPHILS 1 0 - 2 %    ABS. NEUTROPHILS 5.9 1.8 - 8.0 K/UL    ABS. LYMPHOCYTES 1.2 0.9 - 3.6 K/UL    ABS. MONOCYTES 0.8 0.05 - 1.2 K/UL    ABS. EOSINOPHILS 0.1 0.0 - 0.4 K/UL    ABS. BASOPHILS 0.1 0.0 - 0.1 K/UL    DF AUTOMATED     PROTHROMBIN TIME + INR    Collection Time: 10/08/21  3:50 PM   Result Value Ref Range    Prothrombin time 13.6 11.5 - 15.2 sec    INR 1.1 0.8 - 1.2     METABOLIC PANEL, COMPREHENSIVE    Collection Time: 10/08/21  3:50 PM   Result Value Ref Range    Sodium 135 (L) 136 - 145 mmol/L    Potassium 3.3 (L) 3.5 - 5.5 mmol/L    Chloride 97 (L) 100 - 111 mmol/L    CO2 32 21 - 32 mmol/L    Anion gap 6 3.0 - 18 mmol/L    Glucose 144 (H) 74 - 99 mg/dL    BUN 13 7.0 - 18 MG/DL    Creatinine 3.36 (H) 0.6 - 1.3 MG/DL    BUN/Creatinine ratio 4 (L) 12 - 20      GFR est AA 16 (L) >60 ml/min/1.73m2    GFR est non-AA 13 (L) >60 ml/min/1.73m2    Calcium 8.7 8.5 - 10.1 MG/DL    Bilirubin, total 0.4 0.2 - 1.0 MG/DL    ALT (SGPT) 12 (L) 13 - 56 U/L    AST (SGOT) 17 10 - 38 U/L    Alk.  phosphatase 139 (H) 45 - 117 U/L    Protein, total 7.9 6.4 - 8.2 g/dL    Albumin 3.1 (L) 3.4 - 5.0 g/dL    Globulin 4.8 (H) 2.0 - 4.0 g/dL    A-G Ratio 0.6 (L) 0.8 - 1.7     TROPONIN I    Collection Time: 10/08/21  3:50 PM   Result Value Ref Range    Troponin-I, QT <0.02 0.0 - 0.045 NG/ML   MAGNESIUM    Collection Time: 10/08/21  3:50 PM   Result Value Ref Range    Magnesium 2.1 1.6 - 2.6 mg/dL   NT-PRO BNP    Collection Time: 10/08/21  3:50 PM   Result Value Ref Range    NT pro-BNP 5,643 (H) 0 - 1,800 PG/ML   TSH 3RD GENERATION    Collection Time: 10/08/21  3:50 PM   Result Value Ref Range    TSH 2.50 0.36 - 3.74 uIU/mL   POC LACTIC ACID    Collection Time: 10/08/21  4:02 PM   Result Value Ref Range    Lactic Acid (POC) 2.91 (HH) 0.40 - 2.00 mmol/L   POC LACTIC ACID    Collection Time: 10/08/21  7:42 PM   Result Value Ref Range    Lactic Acid (POC) 1.50 0.40 - 2.00 mmol/L           No results for input(s): FIO2I, IFO2, HCO3I, IHCO3, HCOPOC, PCO2I, PCOPOC, IPHI, PHI, PHPOC, PO2I, PO2POC in the last 72 hours. No lab exists for component: IPOC2    Telemetry:normal sinus rhythm    Imaging:  I have personally reviewed the patients radiographs and have reviewed the reports:    CXR Results  (Last 48 hours)               10/08/21 1556  XR CHEST PORT Final result    Impression:  No acute abnormalities. No pneumonia or CHF. Narrative:  Portable CXR:       HISTORY: Hypotension. Comparison June 18, 2021       Cardiac silhouette is normal in size. Ectatic aorta, stable. No vascular   congestion. Eventration right hemidiaphragm, stable. No consolidation or pleural   effusion. CT Results  (Last 48 hours)               10/08/21 1746  CT HEAD WO CONT Final result    Impression:      No CT finding for acute territorial infarct or acute intracranial hemorrhage. Fairly extensive periventricular white matter change. Nonspecific but often   associated with chronic microvascular ischemic disease. Narrative:  CT Head without contrast       HISTORY: Hypotension, dizziness.        COMPARISON: None       TECHNIQUE: Axial imaging from the skull base to the vertex was performed   without intravenous contrast.  Coronal and sagittal reformations. All CT scans   are performed using dose optimization techniques as appropriate to the performed   exam including the following: Automated exposure control, adjustment of mA   and/or kV according to patient size, and use of iterative reconstructive   technique. FINDINGS:        No intracranial hemorrhage. No evidence of midline shift, mass effect, or   obvious mass lesion. There is preservation of the gray and white matter   differentiation, no acute infarct (Please note that CT is less sensitive in the   detection of early infarct). Moderately severe periventricular white matter changes of the cerebrum. The size of the ventricles and sulci are normal.  No extra axial fluid   collections. Heavily calcified intracranial carotid arteries. Visualized paranasal sinuses are clear. No evidence for skull fracture. Work done to facilitate the patient's plan of care by reviewing EMR, completing initial care plan and assess and initiate treatment, diagnostic work-up for attending MD to complete consultation. Total of 60 min critical care time spent at bedside during the course of care providing evaluation,management and care decisions and ordering appropriate treatment related to critical care problems exclusive of procedures. The reason for providing this level of medical care for this critically ill patient was due a critical illness that impaired one or more vital organ systems such that there was a high probability of imminent or life threatening deterioration in the patients condition. This care involved high complexity decision making to assess, manipulate, and support vital system functions, to treat this degree vital organ system failure and to prevent further life threatening deterioration of the patients condition.         Ely Mccoy Hung Covarrubias  10/08/21  Pulmonary [id number] [facility]

## 2021-10-08 NOTE — ED TRIAGE NOTES
Pt presents to ED w/ c/o hypotension following HD today. Pt took one dose of meclizine; pt also c/o dizziness.

## 2021-10-09 ENCOUNTER — APPOINTMENT (OUTPATIENT)
Dept: ULTRASOUND IMAGING | Age: 76
DRG: 853 | End: 2021-10-09
Attending: STUDENT IN AN ORGANIZED HEALTH CARE EDUCATION/TRAINING PROGRAM
Payer: MEDICARE

## 2021-10-09 LAB
ANION GAP SERPL CALC-SCNC: 10 MMOL/L (ref 3–18)
APPEARANCE UR: ABNORMAL
ATRIAL RATE: 97 BPM
B PERT DNA SPEC QL NAA+PROBE: NOT DETECTED
BACTERIA URNS QL MICRO: ABNORMAL /HPF
BASOPHILS # BLD: 0.1 K/UL (ref 0–0.1)
BASOPHILS NFR BLD: 1 % (ref 0–2)
BILIRUB UR QL: NEGATIVE
BORDETELLA PARAPERTUSSIS PCR, BORPAR: NOT DETECTED
BUN SERPL-MCNC: 20 MG/DL (ref 7–18)
BUN/CREAT SERPL: 4 (ref 12–20)
C PNEUM DNA SPEC QL NAA+PROBE: NOT DETECTED
CALCIUM SERPL-MCNC: 8.7 MG/DL (ref 8.5–10.1)
CALCULATED P AXIS, ECG09: -3 DEGREES
CALCULATED R AXIS, ECG10: -31 DEGREES
CALCULATED T AXIS, ECG11: 43 DEGREES
CHLORIDE SERPL-SCNC: 97 MMOL/L (ref 100–111)
CO2 SERPL-SCNC: 30 MMOL/L (ref 21–32)
COLOR UR: ABNORMAL
CREAT SERPL-MCNC: 4.51 MG/DL (ref 0.6–1.3)
DIAGNOSIS, 93000: NORMAL
DIFFERENTIAL METHOD BLD: ABNORMAL
EOSINOPHIL # BLD: 0.2 K/UL (ref 0–0.4)
EOSINOPHIL NFR BLD: 1 % (ref 0–5)
EPITH CASTS URNS QL MICRO: ABNORMAL /LPF (ref 0–5)
ERYTHROCYTE [DISTWIDTH] IN BLOOD BY AUTOMATED COUNT: 16.1 % (ref 11.6–14.5)
FLUAV SUBTYP SPEC NAA+PROBE: NOT DETECTED
FLUBV RNA SPEC QL NAA+PROBE: NOT DETECTED
GLUCOSE BLD STRIP.AUTO-MCNC: 122 MG/DL (ref 70–110)
GLUCOSE BLD STRIP.AUTO-MCNC: 98 MG/DL (ref 70–110)
GLUCOSE SERPL-MCNC: 142 MG/DL (ref 74–99)
GLUCOSE UR STRIP.AUTO-MCNC: NEGATIVE MG/DL
HADV DNA SPEC QL NAA+PROBE: NOT DETECTED
HCOV 229E RNA SPEC QL NAA+PROBE: NOT DETECTED
HCOV HKU1 RNA SPEC QL NAA+PROBE: NOT DETECTED
HCOV NL63 RNA SPEC QL NAA+PROBE: NOT DETECTED
HCOV OC43 RNA SPEC QL NAA+PROBE: NOT DETECTED
HCT VFR BLD AUTO: 33.9 % (ref 35–45)
HGB BLD-MCNC: 10.5 G/DL (ref 12–16)
HGB UR QL STRIP: ABNORMAL
HMPV RNA SPEC QL NAA+PROBE: NOT DETECTED
HPIV1 RNA SPEC QL NAA+PROBE: NOT DETECTED
HPIV2 RNA SPEC QL NAA+PROBE: NOT DETECTED
HPIV3 RNA SPEC QL NAA+PROBE: NOT DETECTED
HPIV4 RNA SPEC QL NAA+PROBE: NOT DETECTED
KETONES UR QL STRIP.AUTO: NEGATIVE MG/DL
LEUKOCYTE ESTERASE UR QL STRIP.AUTO: ABNORMAL
LYMPHOCYTES # BLD: 2.8 K/UL (ref 0.9–3.6)
LYMPHOCYTES NFR BLD: 26 % (ref 21–52)
M PNEUMO DNA SPEC QL NAA+PROBE: NOT DETECTED
MAGNESIUM SERPL-MCNC: 2.3 MG/DL (ref 1.6–2.6)
MCH RBC QN AUTO: 29.9 PG (ref 24–34)
MCHC RBC AUTO-ENTMCNC: 31 G/DL (ref 31–37)
MCV RBC AUTO: 96.6 FL (ref 78–100)
MONOCYTES # BLD: 1.2 K/UL (ref 0.05–1.2)
MONOCYTES NFR BLD: 11 % (ref 3–10)
NEUTS SEG # BLD: 6.6 K/UL (ref 1.8–8)
NEUTS SEG NFR BLD: 60 % (ref 40–73)
NITRITE UR QL STRIP.AUTO: NEGATIVE
P-R INTERVAL, ECG05: 168 MS
PH UR STRIP: 8 [PH] (ref 5–8)
PHOSPHATE SERPL-MCNC: 5.4 MG/DL (ref 2.5–4.9)
PLATELET # BLD AUTO: 281 K/UL (ref 135–420)
PMV BLD AUTO: 10.7 FL (ref 9.2–11.8)
POTASSIUM SERPL-SCNC: 4.4 MMOL/L (ref 3.5–5.5)
PROCALCITONIN SERPL-MCNC: 0.81 NG/ML
PROT UR STRIP-MCNC: >1000 MG/DL
Q-T INTERVAL, ECG07: 344 MS
QRS DURATION, ECG06: 82 MS
QTC CALCULATION (BEZET), ECG08: 436 MS
RBC # BLD AUTO: 3.51 M/UL (ref 4.2–5.3)
RBC #/AREA URNS HPF: ABNORMAL /HPF (ref 0–5)
RSV RNA SPEC QL NAA+PROBE: NOT DETECTED
RV+EV RNA SPEC QL NAA+PROBE: NOT DETECTED
SARS-COV-2 PCR, COVPCR: NOT DETECTED
SODIUM SERPL-SCNC: 137 MMOL/L (ref 136–145)
SP GR UR REFRACTOMETRY: 1.02 (ref 1–1.03)
UROBILINOGEN UR QL STRIP.AUTO: 1 EU/DL (ref 0.2–1)
VANCOMYCIN SERPL-MCNC: 15 UG/ML (ref 5–40)
VENTRICULAR RATE, ECG03: 97 BPM
WBC # BLD AUTO: 10.9 K/UL (ref 4.6–13.2)
WBC URNS QL MICRO: ABNORMAL /HPF (ref 0–4)

## 2021-10-09 PROCEDURE — 74011000250 HC RX REV CODE- 250: Performed by: PHYSICIAN ASSISTANT

## 2021-10-09 PROCEDURE — 84100 ASSAY OF PHOSPHORUS: CPT

## 2021-10-09 PROCEDURE — 85025 COMPLETE CBC W/AUTO DIFF WBC: CPT

## 2021-10-09 PROCEDURE — 74011250637 HC RX REV CODE- 250/637: Performed by: PHYSICIAN ASSISTANT

## 2021-10-09 PROCEDURE — 82962 GLUCOSE BLOOD TEST: CPT

## 2021-10-09 PROCEDURE — 74011000258 HC RX REV CODE- 258: Performed by: INTERNAL MEDICINE

## 2021-10-09 PROCEDURE — 83735 ASSAY OF MAGNESIUM: CPT

## 2021-10-09 PROCEDURE — 80202 ASSAY OF VANCOMYCIN: CPT

## 2021-10-09 PROCEDURE — 74011250636 HC RX REV CODE- 250/636: Performed by: INTERNAL MEDICINE

## 2021-10-09 PROCEDURE — 80048 BASIC METABOLIC PNL TOTAL CA: CPT

## 2021-10-09 PROCEDURE — 74011000258 HC RX REV CODE- 258: Performed by: PHYSICIAN ASSISTANT

## 2021-10-09 PROCEDURE — 65610000006 HC RM INTENSIVE CARE

## 2021-10-09 PROCEDURE — 99291 CRITICAL CARE FIRST HOUR: CPT | Performed by: PHYSICIAN ASSISTANT

## 2021-10-09 PROCEDURE — 74011250636 HC RX REV CODE- 250/636: Performed by: PHYSICIAN ASSISTANT

## 2021-10-09 PROCEDURE — 76770 US EXAM ABDO BACK WALL COMP: CPT

## 2021-10-09 PROCEDURE — 0202U NFCT DS 22 TRGT SARS-COV-2: CPT

## 2021-10-09 PROCEDURE — 84145 PROCALCITONIN (PCT): CPT

## 2021-10-09 PROCEDURE — 74011000250 HC RX REV CODE- 250: Performed by: INTERNAL MEDICINE

## 2021-10-09 PROCEDURE — 36415 COLL VENOUS BLD VENIPUNCTURE: CPT

## 2021-10-09 RX ORDER — MIDODRINE HYDROCHLORIDE 5 MG/1
5 TABLET ORAL ONCE
Status: COMPLETED | OUTPATIENT
Start: 2021-10-09 | End: 2021-10-09

## 2021-10-09 RX ORDER — VANCOMYCIN HYDROCHLORIDE
1250 ONCE
Status: COMPLETED | OUTPATIENT
Start: 2021-10-09 | End: 2021-10-09

## 2021-10-09 RX ORDER — MIDODRINE HYDROCHLORIDE 5 MG/1
5 TABLET ORAL
Status: CANCELLED | OUTPATIENT
Start: 2021-10-09

## 2021-10-09 RX ORDER — NOREPINEPHRINE BITARTRATE/D5W 8 MG/250ML
.5-3 PLASTIC BAG, INJECTION (ML) INTRAVENOUS
Status: DISCONTINUED | OUTPATIENT
Start: 2021-10-09 | End: 2021-10-09

## 2021-10-09 RX ORDER — FLUDROCORTISONE ACETATE 0.1 MG/1
0.1 TABLET ORAL DAILY
Status: DISCONTINUED | OUTPATIENT
Start: 2021-10-09 | End: 2021-10-11

## 2021-10-09 RX ORDER — NOREPINEPHRINE BITARTRATE/D5W 8 MG/250ML
.5-3 PLASTIC BAG, INJECTION (ML) INTRAVENOUS
Status: DISCONTINUED | OUTPATIENT
Start: 2021-10-09 | End: 2021-10-12

## 2021-10-09 RX ADMIN — MIDODRINE HYDROCHLORIDE 5 MG: 5 TABLET ORAL at 16:49

## 2021-10-09 RX ADMIN — DEXTROSE MONOHYDRATE 7 MCG/MIN: 50 INJECTION, SOLUTION INTRAVENOUS at 20:49

## 2021-10-09 RX ADMIN — HEPARIN SODIUM 5000 UNITS: 5000 INJECTION INTRAVENOUS; SUBCUTANEOUS at 00:58

## 2021-10-09 RX ADMIN — HEPARIN SODIUM 5000 UNITS: 5000 INJECTION INTRAVENOUS; SUBCUTANEOUS at 23:30

## 2021-10-09 RX ADMIN — DEXTROSE MONOHYDRATE 8 MCG/MIN: 50 INJECTION, SOLUTION INTRAVENOUS at 15:03

## 2021-10-09 RX ADMIN — VANCOMYCIN HYDROCHLORIDE 1250 MG: 10 INJECTION, POWDER, LYOPHILIZED, FOR SOLUTION INTRAVENOUS at 10:50

## 2021-10-09 RX ADMIN — MIDODRINE HYDROCHLORIDE 5 MG: 5 TABLET ORAL at 04:45

## 2021-10-09 RX ADMIN — WATER 2 G: 1 INJECTION INTRAMUSCULAR; INTRAVENOUS; SUBCUTANEOUS at 23:30

## 2021-10-09 RX ADMIN — HEPARIN SODIUM 5000 UNITS: 5000 INJECTION INTRAVENOUS; SUBCUTANEOUS at 05:38

## 2021-10-09 RX ADMIN — GENTAMICIN SULFATE 130 MG: 40 INJECTION, SOLUTION INTRAMUSCULAR; INTRAVENOUS at 20:46

## 2021-10-09 RX ADMIN — HEPARIN SODIUM 5000 UNITS: 5000 INJECTION INTRAVENOUS; SUBCUTANEOUS at 16:49

## 2021-10-09 RX ADMIN — MIDODRINE HYDROCHLORIDE 5 MG: 5 TABLET ORAL at 10:00

## 2021-10-09 RX ADMIN — FAMOTIDINE 20 MG: 10 INJECTION INTRAVENOUS at 10:01

## 2021-10-09 NOTE — CONSULTS
Consult Note  Consult requested by: Raymond Burgess is a 64 y.o. female [address] [facility] who is being seen on consult for esrd  Chief Complaint   Patient presents with    Hypotension    Nausea     Admission diagnosis: <principal problem not specified>     HPI: pt was admitted with severe hypotension,lactic acidosis. she was having regular dialysis yesterday,dialysis nurse noted worsening hypotension and mental status,nurse offered to call 911 but pt refused,pt was brought to er from home. hx of chronic hypotension on midodrine,oscarley bp is 80 to 90.she has hx of reccurent uti,s/p right ureteral exchange last Tuesday. since admission ,she was given saline bolus,started on vasporessors and antibiotics  Past Medical History:   Diagnosis Date    Acidosis     Anemia     Arteriovenous fistula (HCC)     Chronic kidney disease     on HD at [facility] on [address] on MWF. [phone]    Chronic pain     CKD (chronic kidney disease)     Diabetes ([address]     no meds now    ESRD (end stage renal disease) on dialysis ([address] 9/9/2021    HLD (hyperlipidemia)     HTN (hypertension)     Hyperparathyroidism due to renal insufficiency ([address]     Hypothyroid     Kidney stone     Lung mass     Recurrent UTI     Ureter, stricture     Uric acid nephrolithiasis     Urinary incontinence       Past Surgical History:   Procedure Laterality Date    HX APPENDECTOMY      HX CHOLECYSTECTOMY      HX GASTRIC BYPASS      Gastric stapling    HX KNEE ARTHROSCOPY      HX UROLOGICAL      right PCN placement    HX UROLOGICAL  07/23/2018    RIGHT URETEROSCOPY WITH HOLMIUM LASER    IR EXCHANGE NEPHRO PERC LT SI  2/21/2020    IR EXCHANGE NEPHRO PERC RT SI  4/13/2020    IR EXCHANGE NEPHRO PERC RT SI  7/17/2020    IR NEPHROSTOMY PERC RT PLC CATH  SI  10/14/2020    IR NEPHROURETERAL PERC RT PLC CATH NEW ACCESS  SI  4/30/2020    CT INTRO CATH DIALYSIS CIRCUIT DX ANGRPH FLUOR S&I Left 9/24/2020    FISTULOGRAM LEFT/poss permanent catheter placement performed by Nan [facility]ario MD at [facility] C[facility]    VASCULAR SURGERY PROCEDURE UNLIST      lef AVF       Social History     Socioeconomic History    Marital status: SINGLE     Spouse name: Not on file    Number of children: Not on file    Years of education: Not on file    Highest education level: Not on file   Occupational History    Not on file   Tobacco Use    Smoking status: Never Smoker    Smokeless tobacco: Never Used   Vaping Use    Vaping Use: Never used   Substance and Sexual Activity    Alcohol use: Never    Drug use: Never    Sexual activity: Not on file   Other Topics Concern    Not on file   Social History Narrative    Not on file     Social Determinants of Health     Financial Resource Strain:     Difficulty of Paying Living Expenses:    Food Insecurity:     Worried About Running Out of Food in the Last Year:     [address] in the Last Year:    Transportation Needs:     Lack of Transportation (Medical):      Lack of Transportation (Non-Medical):    Physical Activity:     Days of Exercise per Week:     Minutes of Exercise per Session:    Stress:     Feeling of Stress :    Social Connections:     Frequency of Communication with Friends and Family:     Frequency of Social Gatherings with Friends and Family:     Attends [employer]ab[employer] Services:     Active Member of Clubs or Organizations:     Attends Club or Organization Meetings:     Marital Status:    Intimate Partner Violence:     Fear of Current or Ex-Partner:     Emotionally Abused:     Physically Abused:     Sexually Abused:        Family History   Problem Relation Age of Onset    Heart Surgery Sister      Allergies   Allergen Reactions    Ciprofloxacin Hives    Cyclopentolate Unknown (comments)    Iron Sucrose Diarrhea    Midodrine Unknown (comments)     Denies 910/1/21    Statins-Hmg-Coa Reductase Inhibitors Other (comments)     Body ache        Home Medications:     Prior to Admission Medications   Prescriptions Last Dose Informant Patient Reported? Taking? DULoxetine (Cymbalta) 20 mg capsule   Yes No   Sig: Take 20 mg by mouth daily. HYDROmorphone (DILAUDID) 2 mg tablet   Yes No   Sig: as needed. Narcan 4 mg/actuation nasal spray   Yes No   acetaminophen (TYLENOL) 325 mg tablet   Yes No   Sig: Take 650 mg by mouth two (2) times a day. allopurinoL (ZYLOPRIM) 100 mg tablet   Yes No   Sig: Take 200 mg by mouth daily. ascorbic acid, vitamin C, (VITAMIN C) 500 mg tablet   Yes No   Sig: Take 500 mg by mouth daily. b complex vitamins (Vitamins B Complex) tablet   Yes No   Sig: Take 1 Tab by mouth daily. biotin 1,000 mcg chew   Yes No   Sig: Take 1 Tab by mouth daily. calcitRIOL (ROCALTROL) 0.25 mcg capsule   Yes No   Sig: Take 0.25 mcg by mouth daily. cholecalciferol (VITAMIN D3) (2,000 UNITS /50 MCG) cap capsule   Yes No   Sig: Take 2,000 Units by mouth two (2) times a day. Take two tabs a total of 4000 units   cyanocobalamin 1,000 mcg tablet   Yes No   Sig: Take 1,000 mcg by mouth daily. doxercalciferol (HECTOROL IV)   Yes No   Si mcg.   estradioL (Estrace) 0.01 % (0.1 mg/gram) vaginal cream   No No   Sig: Apply a fingertip amount around the urethra three times a week. fludrocortisone (FLORINEF) 0.1 mg tablet   No No   Sig: Take 1 Tab by mouth daily. Patient not taking: Reported on 10/1/2021   gabapentin (NEURONTIN) 100 mg capsule   Yes No   Sig: Take 100 mg by mouth nightly. AS needed   heparin sod,porcine/0.9 % NaCl (heparin flush, porcine, in ns) 100 unit/mL kit   Yes No   Sig: Heparin Sodium (Porcine) 1,000 Units/mL Systemic   lactobacillus sp. 50 billion cpu (BIO-K PLUS) 50 billion cell -375 mg cap capsule   No No   Sig: Take 1 Cap by mouth daily. latanoprost (XALATAN) 0.005 % ophthalmic solution   Yes No   Sig: Administer 1 Drop to both eyes nightly.  One drop at bedtime   levothyroxine (SYNTHROID) 125 mcg tablet   Yes No   Sig: Take 125 mcg by mouth Daily (before breakfast). meclizine (ANTIVERT) 25 mg tablet   Yes No   Sig: Take 25 mg by mouth as needed. methoxy peg-epoetin beta (MIRCERA INJECTION)   Yes No   Si mcg.   midodrine (PROAMATINE) 2.5 mg tablet   Yes No   Sig: Take 1 Tablet by mouth three (3) times daily (with meals). omeprazole (PRILOSEC) 20 mg capsule   Yes No   Sig: Take 20 mg by mouth daily. ondansetron (ZOFRAN ODT) 4 mg disintegrating tablet   Yes No   Sig: Take 4 mg by mouth every eight (8) hours as needed for Nausea or Vomiting.   sodium bicarbonate 650 mg tablet   No No   Sig: Take 2 Tabs by mouth three (3) times daily. Indications: excess body acid   Patient not taking: Reported on 10/1/2021   tamsulosin (FLOMAX) 0.4 mg capsule   No No   Sig: Take 1 Cap by mouth daily. vit B Cmplx 3-FA-Vit C-Biotin (NEPHRO HOWIE RX) 1- mg-mg-mcg tablet   Yes No   Sig: Take 1 Tab by mouth daily. vitamin B complex (B COMPLEX VITAMINS PO)   Yes No   Sig: Take 1 Tablet by mouth daily. vitamin D3-folic acid 3,428 unit- 1 mg tab   Yes No   Sig: Take 1 Tablet by mouth.       Facility-Administered Medications: None       Current Facility-Administered Medications   Medication Dose Route Frequency    NOREPINephrine (LEVOPHED) 8 mg in 5% dextrose 250mL (32 mcg/mL) infusion  0.5-30 mcg/min IntraVENous TITRATE    famotidine (PF) (PEPCID) 20 mg in 0.9% sodium chloride 10 mL injection  20 mg IntraVENous Q24H    fludrocortisone (FLORINEF) tablet 0.1 mg  0.1 mg Oral DAILY    meropenem (MERREM) 500 mg in sterile water (preservative free) 20 mL IV syringe  500 mg IntraVENous Q24H    VANCOMYCIN INFORMATION NOTE   Other Rx Dosing/Monitoring    midodrine (PROAMATINE) tablet 5 mg  5 mg Oral TID WITH MEALS    diphenhydrAMINE-zinc acetate 2%-0.1% (BENADRYL) cream   Topical TID PRN    insulin lispro (HUMALOG) injection   SubCUTAneous Q6H    glucose chewable tablet 16 g  4 Tablet Oral PRN    glucagon (GLUCAGEN) injection 1 mg  1 mg IntraMUSCular PRN    dextrose (D50W) injection syrg 12.5-25 g  25-50 mL IntraVENous PRN    heparin (porcine) injection 5,000 Units  5,000 Units SubCUTAneous Q8H     Current Outpatient Medications   Medication Sig    vitamin B complex (B COMPLEX VITAMINS PO) Take 1 Tablet by mouth daily.  heparin sod,porcine/0.9 % NaCl (heparin flush, porcine, in ns) 100 unit/mL kit Heparin Sodium (Porcine) 1,000 Units/mL Systemic    doxercalciferol (HECTOROL IV) 4 mcg.  HYDROmorphone (DILAUDID) 2 mg tablet as needed.  meclizine (ANTIVERT) 25 mg tablet Take 25 mg by mouth as needed.  methoxy peg-epoetin beta (MIRCERA INJECTION) 30 mcg.  Narcan 4 mg/actuation nasal spray     vitamin D3-folic acid 5,143 unit- 1 mg tab Take 1 Tablet by mouth.  midodrine (PROAMATINE) 2.5 mg tablet Take 1 Tablet by mouth three (3) times daily (with meals).  DULoxetine (Cymbalta) 20 mg capsule Take 20 mg by mouth daily.  fludrocortisone (FLORINEF) 0.1 mg tablet Take 1 Tab by mouth daily. (Patient not taking: Reported on 10/1/2021)    b complex vitamins (Vitamins B Complex) tablet Take 1 Tab by mouth daily.  estradioL (Estrace) 0.01 % (0.1 mg/gram) vaginal cream Apply a fingertip amount around the urethra three times a week.  biotin 1,000 mcg chew Take 1 Tab by mouth daily.  cyanocobalamin 1,000 mcg tablet Take 1,000 mcg by mouth daily.  gabapentin (NEURONTIN) 100 mg capsule Take 100 mg by mouth nightly. AS needed    lactobacillus sp. 50 billion cpu (BIO-K PLUS) 50 billion cell -375 mg cap capsule Take 1 Cap by mouth daily.  tamsulosin (FLOMAX) 0.4 mg capsule Take 1 Cap by mouth daily.  sodium bicarbonate 650 mg tablet Take 2 Tabs by mouth three (3) times daily. Indications: excess body acid (Patient not taking: Reported on 10/1/2021)    acetaminophen (TYLENOL) 325 mg tablet Take 650 mg by mouth two (2) times a day.  allopurinoL (ZYLOPRIM) 100 mg tablet Take 200 mg by mouth daily.     ascorbic acid, vitamin C, (VITAMIN C) 500 mg tablet Take 500 mg by mouth daily.  calcitRIOL (ROCALTROL) 0.25 mcg capsule Take 0.25 mcg by mouth daily.  cholecalciferol (VITAMIN D3) (2,000 UNITS /50 MCG) cap capsule Take 2,000 Units by mouth two (2) times a day. Take two tabs a total of 4000 units    latanoprost (XALATAN) 0.005 % ophthalmic solution Administer 1 Drop to both eyes nightly. One drop at bedtime    levothyroxine (SYNTHROID) 125 mcg tablet Take 125 mcg by mouth Daily (before breakfast).  omeprazole (PRILOSEC) 20 mg capsule Take 20 mg by mouth daily.  ondansetron (ZOFRAN ODT) 4 mg disintegrating tablet Take 4 mg by mouth every eight (8) hours as needed for Nausea or Vomiting.  vit B Cmplx 3-FA-Vit C-Biotin (NEPHRO HOWIE RX) 1- mg-mg-mcg tablet Take 1 Tab by mouth daily. Review of Systems:   A comprehensive review of systems was negative except for that written in the HPI. Data Review:    Labs: Results:       Chemistry Recent Labs     10/09/21  0455 10/08/21  1550   * 144*    135*   K 4.4 3.3*   CL 97* 97*   CO2 30 32   BUN 20* 13   CREA 4.51* 3.36*   CA 8.7 8.7   AGAP 10 6   BUCR 4* 4*   AP  --  139*   TP  --  7.9   ALB  --  3.1*   GLOB  --  4.8*   AGRAT  --  0.6*      PTH  Lab Results   Component Value Date/Time    Calcium 8.7 10/09/2021 04:55 AM    Phosphorus 5.4 (H) 10/09/2021 04:55 AM    PTH, Intact 398.4 (H) 05/08/2020 02:30 PM      CBC w/Diff Recent Labs     10/09/21  0455 10/08/21  1550   WBC 10.9 7.9   RBC 3.51* 3.62*   HGB 10.5* 10.9*   HCT 33.9* 34.2*    247   GRANS 60 74*   LYMPH 26 15*   EOS 1 1      Coagulation Recent Labs     10/08/21  1550   PTP 13.6   INR 1.1       Iron/Ferritin No results for input(s): IRON in the last 72 hours. No lab exists for component: TIBCCALC   BNP No results for input(s): BNPP in the last 72 hours. Cardiac Enzymes No results for input(s): CPK, CKND1, PATTI in the last 72 hours.     No lab exists for component: CKRMB, TROIP   Liver Enzymes Recent Labs 10/08/21  1550   TP 7.9   ALB 3.1*   *      Thyroid Studies Lab Results   Component Value Date/Time    TSH 2.50 10/08/2021 03:50 PM        Urinalysis Lab Results   Component Value Date/Time    Color DARK YELLOW 06/18/2021 05:21 PM    Appearance TURBID 06/18/2021 05:21 PM    Specific gravity 1.021 06/18/2021 05:21 PM    pH (UA) 8.5 (H) 06/18/2021 05:21 PM    Protein >1,000 (A) 06/18/2021 05:21 PM    Glucose Negative 06/18/2021 05:21 PM    Ketone Negative 06/18/2021 05:21 PM    Bilirubin Negative 06/18/2021 05:21 PM    Urobilinogen 0.2 06/18/2021 05:21 PM    Nitrites Negative 06/18/2021 05:21 PM    Leukocyte Esterase LARGE (A) 06/18/2021 05:21 PM    Epithelial cells 1+ 06/18/2021 05:21 PM    Bacteria 1+ (A) 06/18/2021 05:21 PM    WBC TOO NUMEROUS TO COUNT 06/18/2021 05:21 PM    RBC 4 to 5 06/18/2021 05:21 PM         IMAGES:   XR Results (maximum last 3): Results from [place] encounter on 10/08/21    XR CHEST PORT    Narrative  Portable Chest    CPT CODE: 84501    HISTORY: Line placements. FINDINGS:    Compared with study done earlier the same day at 1521 hours. Right internal jugular line tip at the proximal SVC. Additional wires overlie  the patient. Surgical clips at the epigastrium. Ectasia of the descending  thoracic aorta elevated diaphragm on the right stable. No pneumothorax, effusion  or new lung consolidation. Impression  Right CVL tip at the proximal SVC. No pneumothorax. XR ABD (KUB)    Narrative  Abdomen KUB    HISTORY: Hypotension. Correlated with urography 10/5/2021    FINDINGS: Single frontal image of the abdomen. Right ureteral stent in the  expected location. Surgical clips at the epigastrium and right upper quadrant. Wires overlie the patient. Overlying clothing artifact. No dilated bowel loops. Advanced degenerative disc disease of the lumbar spine. Impression  Radiographically orthotopic right ureteral stent.     Nonspecific nonobstructive bowel gas pattern. XR CHEST PORT    Narrative  Portable CXR:    HISTORY: Hypotension. Comparison June 18, 2021    Cardiac silhouette is normal in size. Ectatic aorta, stable. No vascular  congestion. Eventration right hemidiaphragm, stable. No consolidation or pleural  effusion. Impression  No acute abnormalities. No pneumonia or CHF. CT Results (maximum last 3): Results from [place] encounter on 10/08/21    CT HEAD WO CONT    Narrative  CT Head without contrast    HISTORY: Hypotension, dizziness. COMPARISON: None    TECHNIQUE:  Axial imaging from the skull base to the vertex was performed  without intravenous contrast.  Coronal and sagittal reformations. All CT scans  are performed using dose optimization techniques as appropriate to the performed  exam including the following: Automated exposure control, adjustment of mA  and/or kV according to patient size, and use of iterative reconstructive  technique. FINDINGS:    No intracranial hemorrhage. No evidence of midline shift, mass effect, or  obvious mass lesion. There is preservation of the gray and white matter  differentiation, no acute infarct (Please note that CT is less sensitive in the  detection of early infarct). Moderately severe periventricular white matter changes of the cerebrum. The size of the ventricles and sulci are normal.  No extra axial fluid  collections. Heavily calcified intracranial carotid arteries. Visualized paranasal sinuses are clear. No evidence for skull fracture. Impression  No CT finding for acute territorial infarct or acute intracranial hemorrhage. Fairly extensive periventricular white matter change. Nonspecific but often  associated with chronic microvascular ischemic disease.       Results from [place] encounter on 06/18/21    CT ABD PELV WO CONT    Narrative  EXAM: CT of the Abdomen and Pelvis without IV contrast    CLINICAL INDICATION:  Evaluate for hydro/worsening stricture . Flank pain. Patient on dialysis. TECHNIQUE: CT of the abdomen and pelvis. Sagittal and coronal reformations    All CT scans at this facility are performed using dose optimization technique as  appropriate to a performed exam, to include automated exposure control,  adjustment of the mA and/or kV according to patient size (including appropriate  matching for site specific examination) or use of iterative reconstruction  technique. IV CONTRAST: None    ENTERIC CONTRAST: None    COMPARISON: Retrograde pyelogram dated 3/2/2021    FINDINGS:  Limitations: The evaluation of the solid organs is limited due to the lack of IV  contrast.    Lower Chest: No acute infiltrate or effusion appreciated. The heart and  pericardium are unremarkable. Peritoneum: No free air appreciated. No free fluid present. No fluid collections  present. Liver: No focal lesion appreciated. Biliary/Gallbladder: The biliary tree is mildly prominent. This is likely  related to postcholecystectomy status. Neck are surgically absent. Spleen: Splenomegaly is present. Pancreas: No focal lesion appreciated. The pancreatic duct is unremarkable. No  peripancreatic inflammation or adenopathy. : The adrenal glands are unremarkable. The kidneys are atrophic. The right  kidney as diminutive exophytic hypodensities which likely represent cysts. A  prominent right extrarenal pelvis is noted. A ureteral stent is noted within the  renal pelvis and ureter with the distal end within the bladder. No evidence to  suggest obstruction. No urolithiasis appreciated along the course of the ureter  or stent. The left kidney is atrophic with multiple likely cyst. The left  collecting system is no evidence of hydroureteronephrosis. No acute pathology in  the bladder. The uterus and adnexa are unremarkable. GI: Postoperative changes are noted in the stomach. The small bowel is without  evidence of obstruction.  No small bowel wall thickening. The mesentery is  without inflammation or adenopathy. The appendix is unremarkable. A few  diverticula are noted. Aorta and retroperitoneum: The abdominal aorta is mildly tortuous. No periaortic  adenopathy seen. No fluid collections in the retroperitoneum appreciated. Abdominal wall/Inguinal: Mild subcutaneous edema is noted. Musculoskeletal: Multilevel degenerative disc disease and facet arthropathy are  noted. Bilateral hip osteoarthritis is noted. Impression  Right ureteral stent which is well-positioned. No evidence to suggest  obstruction or complication. Atrophic kidneys with bilateral cysts. Mild splenomegaly. Results from [place] encounter on 01/07/21    CT ABD PELV WO CONT    Narrative  EXAM: CT ABD PELV WO CONT    CLINICAL INDICATION/HISTORY: 66 years Female. Right-sided flank pain. History of  stones. Urinary tract infection. Via a stent position. ADDITIONAL HISTORY: None      TECHNIQUE: CT of the abdomen and pelvis without IV contrast. Sagittal and  coronal reformats were created. All CT scans at this facility are performed using dose optimization technique as  appropriate to a performed exam, to include automated exposure control,  adjustment of the mA and/or kV according to patient size (including appropriate  matching for site specific examination) or use of iterative reconstruction  technique. IV CONTRAST: None    COMPARISON: None    FINDINGS:  Limitations: There is limited evaluation of solid organs and vasculature due to  lack of intravenous contrast.    Lower Chest: Coronary artery calcifications. Normal heart size. Small  fat-containing Bochdalek type posterior diaphragmatic hernias. Elevation of the  right hemidiaphragm. Mesentery/Peritoneum: Unremarkable    Liver: Unremarkable    Gallbladder/biliary: Status post cholecystectomy. No appreciable biliary ductal  dilatation.     Pancreas: Unremarkable      Spleen: Unremarkable    Adrenal Glands: Unremarkable    Kidneys: Multiple water attenuating cystic lesions the bilateral kidneys, not  closely evaluated without contrast. No suspicious features are appreciated. Small peripheral calcification noted at the cysts in the interpolar region of  the left kidney. Right-sided double-J nephroureteral stent extending from the right renal pelvis  and the urinary bladder. Moderate right renal pelvis dilatation as well as  moderate distention of the proximal and central thirds of the right ureter. The  distal aspect of the right ureter appears relatively decompressed. There is a  suggestion of urothelial thickening within the right renal pelvis and proximal  central third of the right ureter. No left-sided hydronephrosis. No renal  calculi. Urinary Bladder: Nephroureteral stent extends into the urinary bladder. Otherwise unremarkable. Other Pelvic Organs: No suspicious adnexal mass appreciated. Bowel: Scattered colonic diverticula are no evidence of acute diverticulitis. Moderate degree of stool throughout the colon. No abnormally dilated or  thickened bowel loops are appreciated. The appendix is not confidently  visualized; however there are no secondary findings to suggest acute  appendicitis. Small hiatal hernia. Prior gastric bypass. Lymph Nodes: No lymphadenopathy by size criteria. Vessels: Atherosclerosis in the abdominal aorta and its branches. No abdominal  aortic aneurysm. Calcifications at the bilateral renal ostia. Body wall: Moderate anasarca. Musculoskeletal: No acute fracture. No aggressive osseous lesion appreciated. Bilateral greater trochanteric enthesopathy. Bilateral gluteus medius/minimus  muscle fatty atrophy. Rotatory scoliosis. Symphysis pubis chondrocalcinosis. Mild to moderate bilateral hip arthrosis. There is multilevel degenerative  changes of the spine. Grade 1 anterolisthesis of L5 on S1. Impression  IMPRESSION:      1.  Right-sided double-J nephroureteral stent extending from the right renal  pelvis the urinary bladder.  -Moderate right renal pelviectasis and mild to moderate dilatation of the  proximal middle thirds of the right ureter in spite of the presence of the  stent. This could reflect stent malfunction or sequela of chronic distention.  -Urothelial thickening within the right renal pelvis and distended ureter could  reflect infectious or inflammatory process. Recommend correlation with  urinalysis and clinical inflammatory markers. 2. Multiple bilateral renal cortical cystic lesions, not closely assessed  without contrast. Renal ultrasound could be considered to more accurately  characterize. 3. Cholecystectomy. Diverticulosis. Advanced degenerative changes of the spine. Scoliosis. Gastric bypass. Small hiatal hernia. Additional incidental findings  as above. MRI Results (maximum last 3): Results from [place] encounter on 03/22/21    MRI LUMB SPINE WO CONT    Narrative  Sagittal and axial multisequence MR images of lumbar spine were obtained. HISTORY: Spinal stenosis. Neurogenic claudication.  images show mild scoliosis, left convexity. Grade 1 anterior spondylolisthesis of L5 on S1. No definite spondylolysis. Trace  retrolisthesis of L1. In general, loss of discal height throughout, with  endplate irregularity and endplate spondylosis. Discogenic endplate fatty  changes at L2-L3. No edema or pathologic marrow signal. Conus medullaris ends at  T12-L1 with normal morphology and signal intensity. Multiple presumed cysts in the kidneys    T11-T12: Sagittal images show posterior disc bulge with mild to moderate  bilateral foraminal stenosis. No nerve root compression suspected. No severe  central stenosis or cord compression. T12-L1: Slightly more prominent posterior disc bulge with endplate spondylosis. No central stenosis. Moderate foraminal stenosis. L1-L2: Disc bulge. Endplate spondylosis.  AP canal measures 8.7 mm, mild central  stenosis. With facet hypertrophy moderate bilateral foraminal stenosis. Exiting  L1 nerve root compression possible. L2-L3: Similar posterior disc bulge and endplate spondylosis with mild central  stenosis. Moderate bilateral foraminal stenosis. L3-L4: Mild posterior disc bulge. Facet and ligamentous hypertrophy more severe. Moderate central stenosis. Moderate right and mild left foraminal stenosis. L4-L5: Posterior disc bulge and central disc protrusion, slightly worse on the  left with effacement left lateral recess. Facet and ligamentous hypertrophy. Severe left foraminal stenosis. Compression of left exiting L4 nerve root and  descending L5 nerve root. Mild right foraminal stenosis. L5-S1: Posterior disc bulge. More severe facet and ligamentous hypertrophy. Severe central stenosis with triangular configuration the canal. Bilateral facet  joint effusion and facet inflammation. Severe left and moderate severe right  foraminal stenosis. Compression of exiting L5 nerve roots. Impression  1. Severe central stenosis with foraminal stenosis at L5-S1 from disc bulging  and facet/ligamentous hypertrophy with facet joint effusion and inflammation. L5  foraminal stenosis with compression of exiting L5 nerve roots. 2. Left-sided disease at L4-L5 with compression of left exiting L4 and  descending L5 nerve roots. 3. Scoliosis. Degenerative disease at other levels as described. 4. Presumed cystic disease in kidneys. Clinical correlation? Nuclear Medicine Results (maximum last 3): No results found for this or any previous visit. US Results (maximum last 3): Results from [place] encounter on 10/08/21    US RETROPERITONEUM COMP    Narrative  Retroperitoneal Ultrasound Complete    INDICATION: Recent stent exchange 10/5/2021. TECHNIQUE: Select images from retroperitoneal ultrasound provided for  interpretation.     COMPARISON: 6/21/2021. FINDINGS:    Right Kidney: The right kidney measures 5.4 x 3.3 x 2.5 cm in size with  significant cortical thinning. The echotexture is increased. No  hydronephrosis. No renal calculi evident. No contour deforming mass. Left Kidney: The left kidney measures 5.7 x 2.8 x 2.4 cm in size. The  echotexture is  increased. No hydronephrosis. .  No renal calculi evident. 1.2 cm cystic-appearing lesion medially. 1.6 cm cyst in the upper pole. 1.5 cm  cyst in the interpolar kidney. Bladder: The bladder is not well distended measuring 3.2 x 4.7 x 7.2 cm (volume  75.2 mL). Impression  Atrophic kidneys compatible with chronic kidney disease. No hydronephrosis. Stent not clearly visualized. Results from Hospital Encounter encounter on 06/18/21    US RETROPERITONEUM COMP    Narrative  EXAM:  US RETROPERITONEUM COMP    INDICATION:  right hydronephrosis    COMPARISON: CT 2 days prior    TECHNIQUE:  Real-time sonography of the kidneys, retroperitoneum and bladder was performed  with multiple static images obtained. FINDINGS:    Both kidneys are poorly delineated due to chronic atrophy. Small bilateral renal  cystic lesions are identified. No definite nephrolithiasis. The previously  identified stent not delineated on current exam. Both kidneys demonstrate  increased echogenicity. Suggestion of mild right hydronephrosis. The urinary bladder is normal.    Impression  Findings similar to previous examination which limited today's exam. There is  bilateral renal atrophy and multiple bilateral small cysts. -Previous right renal ureteral stent not delineated on today's exam which may be  secondary to ultrasound limitations. Suggestion of mild right hydronephrosis. Correlate clinically. CT may be beneficial for further clarification.  -Medical renal disease. Please see report for additional findings and full details. DEXA Results (maximum last 3):   No results found for this or any previous visit. [facility] Results (maximum last 3): No results found for this or any previous visit. IR Results (maximum last 3): Results from [place] encounter on 10/14/20    IR NEPHROSTOMY PERC RT PLC CATH  SI    Narrative  Procedure: Exchange NU   for double-J catheter    CLINICAL INDICATION: Attending physician requests exchange for double-J catheter    Exam was obtained moderate conscious sedation was provided for 30 minutes    Fluoroscopy time 3.2 minutes    mGy 70  14 spot images documented the procedure    Cc observing maximum barrier precautions skin was draped and prepped. The  existing and new catheter was injected demonstrating contrast filling into the  collecting system. Over-the-wire exchange for a 8 Somali double-J catheter was  then performed under fluoroscopy with care taken to holding proximal loop into  the renal pelvis final positioning was good. All wires and catheters were removed from the existing tract. Impression  IMPRESSION:    Technically successful nephrostogram.    Technically successful fluoroscopy assisted exchange for 8 Somali double-J  catheter, right side. Moderate sedation provided for the procedure, 30 minutes      Results from [place] encounter on 07/15/20    IR EXCHANGE NEPHRO PERC RT SI    Narrative  PREOPERATIVE DIAGNOSIS: Obstructive uropathy    POSTOPERATIVE DIAGNOSIS: Same    INDICATION: 66 years of female with history of obstructive uropathy and  previously recanalized high-grade right ureteral stricture and subsequent  nephroureteral tube placement. Patient is now hospitalized with urosepsis. Patient is here for right antegrade nephrostogram and nephroureteral stent  exchange. ATTENDING: KOKI Payne Loud: None. PROCEDURES:  1. Diagnostic right antegrade nephrostograms. 2. Right nephroureteral stents exchange.     ANESTHESIA: 1% local lidocaine as well as moderate intravenous sedation with  Versed and fentanyl given and monitored per independently trained interventional  radiology nurse under my direct supervision for 23 minutes. Please see detailed  nursing records for medication dosing. CONTRAST: 30 mL of Visipaque 320. [place] COMPLICATIONS: None    DRAIN:  1. Right antegrade 8 Kuwaiti 26 cm nephroureteral catheter close to 4 internal  drainage. CATHETER: None. EBL: Minimal.    SPECIMEN: 3 cc of clear urine was collected and sent for culture and  sensitivity. Fluoroscopy radiation dose: Cumulative Air KERMA = 117 mGy. TECHNIQUE: The risks, benefits, and alternatives were discussed. Written and  verbal consent obtained. Patient was placed in the prone on the table. Right  flank prepped and draped in usual sterile fashion. . Maximum sterile barrier  technique was used.  view was obtained. Timeout was performed. Detailed  antegrade diagnostic nephrostogram was through the existing catheter was  performed. Under direct fluoroscopic guidance stiff Glidewire was used to secure access to  the urinary bladder. Old right tube was removed over wire. Under direct  fluoroscopic guidance new 8 Kuwaiti 24 cm right antegrade nephroureteral stent  was deployed. Distal coil was formed in the urinary bladder. Proximal coil was  formed in the right renal pelvis. Approximately 3 cc of clear urine was  collected from the right after catheter exchange and sent for culture and  sensitivity. Position of the tube was confirmed with injection of small amount  of contrast media. Sterile dressing was applied. External tubing was capped. Catheter was secured to the skin with 0 Prolene sutures. Patient tolerated procedure fairly well. There were no immediate complications. Patient was transferred to recovery in stable condition. Dr. Saima Young was  present and performed the procedure. FINDINGS:  view of the abdomen demonstrated good position of right  nephroureteral stent.  No fracture or catheter kinking is demonstrated. Detailed  diagnostic antegrade nephrostograms demonstrated no evidence of hydronephrosis  or hydroureter on the right. Tube is patent. New right nephroureteral catheter demonstrates good position. Right  nephroureteral catheter is patent. Impression  IMPRESSION:    Successful, uncomplicated right diagnostic antegrade nephrostogram with  antegrade nephroureteral stent exchange. Proximal and distal renal collecting  systems completely decompressed. Free flow contrast media in the urinary  bladder. No debris. No hydronephrosis or hydroureter. Plan: Patient should come back interventional radiology department at  approximately 8 weeks for routine exchange of the right nephroureteral stent. Results from [place] encounter on 04/13/20    IR NEPHROURETERAL PERC RT [facility] CATH NEW ACCESS  SI    Narrative  PREOPERATIVE DIAGNOSIS: Obstructive uropathy. POSTOPERATIVE DIAGNOSIS: Same    INDICATION: 66years old female with history of frequent UTIs and right distal  ureteral high-grade stricture with associated obstructive uropathy. Patient  previously had antegrade decompression with nephrostomy and now comes back for  stricture recanalization with nephroureteral stent placement. ATTENDING: Dr. Adrianne Burgos M.D.    Janet Bello: None. PROCEDURES:  1. Diagnostic right antegrade nephrostogram through the existing catheter. 2. Image guided recanalization of high-grade distal right ureteral stricture. 3. Image guided right distal ureter high-grade stricture balloon ureteroplasty. 4. Image guided right nephroureteral catheter placement. ANESTHESIA: Local 1% lidocaine as well as moderate intravenous sedation with  Versed and fentanyl given and monitored per independently trained interventional  radiology nurse under my direct supervision for 45 minutes. Please see nursing  record for precise medication dosing. CONTRAST: 35 cc of Omnipaque.     COMPLICATIONS: None    DRAIN: None.    CATHETER: Right 10 Czech 26 cm nephroureteral stent. External tubing was capped  at the end of the procedure. EBL: Minimal.    SPECIMEN: None. FLUOROSCOPY RADIATION DOSE: Cumulative Air Kerma = 212 mGy. TECHNIQUE: The risks, benefits, and alternatives were discussed. Written and  verbal consent obtained. Patient was brought to the interventional radiology  suite and placed prone on the table. Right flank was prepped and draped in the  usual sterile fashion. Preoperative antibiotics were given. Timeout was  performed.  view was obtained. 1% lidocaine was used. Detailed diagnostic antegrade nephrostogram through the existing right  percutaneous nephrostomy tube catheter was obtained without difficulty. Previously placed right nephrostomy tube catheter was removed over wire. With assistance of 4 Czech Kumpe catheter, Glidewire advantage was negotiated  in the distal aspect of the right ureter under direct image guidance without  difficulty. Detailed diagnostic antegrade nephrostogram of the distal right ureter was  performed in multiple obliques. Kumpe catheter was removed and over wire 8 Czech 45 cm destination sheath was  advanced into the distal right ureter. Under direct fluoroscopic guidance with assistance of Glidewire advantage and  [place] catheter high-grade distal right ureteral stricture was recanalized  and access to the urinary bladder was achieved. This was confirmed with  injection of small amount of contrast media. Over wire with assistance of 6 mm x 4 cm  balloon catheter ureteroplasty  of the high-grade recanalized distal right ureteral stricture was performed  under direct fluoroscopic guidance without difficulty with complete stricture  waist resolution. Balloon catheter was removed over wire. Under direct  fluoroscopic guidance 10 Czech, 26 cm nephroureteral stent was  advanced over wire. Coil was formed in the right renal pelvis. Distal coil was  formed in the urinary bladder. Tube position was confirmed with injection of  small amount of contrast media. External tubing was affixed to the skin with O  Prolene. Tube was capped. Sterile dressing was applied. Patient tolerated procedure fairly well. There were no immediate complications. Patient was transferred to recovery in stable condition. Dr. Bonny Moyer was  present and performed the procedure. FINDINGS:  imaging of the abdomen demonstrated essentially unchanged  position of the right PCN catheter. Detailed diagnostic antegrade nephrostogram demonstrated moderately dilated  right renal pelvis with associated moderate hydronephrosis of the right ureter  and associated high-grade obstruction at the UVJ. Urinary bladder was not  opacified. High-grade stricture was recanalized as described above. Position of the wire was confirmed in the urinary bladder. No contrast  extravasation. Balloon ureteroplasty revealed residual associated high-grade stricture with  complete resolution of strictures waist region. Good position of the new 10 Chinese, 26 cm nephroureteral catheter was noted. Catheter is patent. No evidence of kinking or fracture. External tubing was  capped. The above findings were discussed with urology in patient in detail. Impression  IMPRESSION:    Successful uncomplicated right nephrostomy tube removal, high-grade distal right  ureteral stricture recanalization, right ureteral high-grade stricture balloon  ureteroplasty as well as placement of the new right nephroureteral catheter. Plan: Patient will come back to interventional radiology in approximately 4  weeks for detailed antegrade nephrostogram and possible nephroureteral catheter  removal. If stricture persists however internalization with double-J stent will  be considered. VAS/US Results (maximum last 3): No results found for this or any previous visit.       PET Results (maximum last 3):  No results found for this or any previous visit. No results found for this or any previous visit. @LASTPROCAMB([id number])    CULTURE:   )  Recent Labs     10/08/21  1550 10/08/21  1535   CULT NO GROWTH AFTER 13 HOURS NO GROWTH AFTER 13 HOURS     Recent Labs     10/08/21  1550 10/08/21  1535   CULT NO GROWTH AFTER 13 HOURS NO GROWTH AFTER 13 HOURS       Physical Assessment:     Visit Vitals  BP (!) 101/39   Pulse 79   Temp 97.6 °F (36.4 °C)   Resp 17   Ht 5' 2\" (1.575 m)   Wt 94 kg (207 lb 3.7 oz)   SpO2 96%   BMI 37.90 kg/m²     Last 3 Recorded Weights in this Encounter    10/08/21 1523   Weight: 94 kg (207 lb 3.7 oz)       Intake/Output Summary (Last 24 hours) at 10/9/2021 1500  Last data filed at 10/8/2021 1852  Gross per 24 hour   Intake 750 ml   Output --   Net 750 ml       Physial Exam:  General appearance: alert, cooperative  Skin: normal coloration and turgor, no rashes, no suspicious skin lesions noted. HEENT: Head; normocephalic, atraumatic. MARY. ENT- ENT exam normal, no neck nodes or sinus tenderness. Lungs: clear to auscultation bilaterally  Heart: S1, S2 normal  Abdomen: soft, non-tender. Bowel sounds normal. No masses,  no organomegaly  Extremities: extremities normal, atraumatic, no cyanosis or edema    PLAN / RECOMMENDATION:    Esrd,on dialysis mon wed Friday. completed dialysis yesterday  Septic shock,probably urinary source,hx of reccurent uti,s/p ureteral stent exchange 4 days ago,continue antibiotics ,adjust for renal failure  Hypotension,increase midodrine,continue levophed  Anemia,continue epo     Thank you for the consultation to participate in patient's care. I have personally discussed my plan with the referring physician.      Bob Stuart MD  October 9, 2021

## 2021-10-09 NOTE — PROGRESS NOTES
[address] [place] [address] [facility]. Pulmonary, Critical Care, and Sleep Medicine    Name: Tommy Lopez MRN: [id number]   : 1945 Hospital: [address] [place] [address] Dr   Date: 10/9/2021  Admission Date: 10/8/2021     Chart and notes reviewed. Data reviewed. I have evaluated all findings. [x]I have reviewed the flowsheet and previous days notes. []The patient is unable to give any meaningful history or review of systems because the patient is:  []Intubated []Sedated   []Unresponsive      [x]The patient is critically ill on      []Mechanical ventilation [x]Pressors   []BiPAP []         Interval HPI:Patient is a 64 y.o. female w/ pmhx of chronic hypotension on midodrine, ESRD on HD, R ureteral stricture with stent in place, recently exchanged on 10/5, and frequent UTIs who presented to the ED complaining of lightheadedness onset yesterday. She reported that she went to dialysis as usual and had a normal run of HD. She reports when her lightheadedness did not improve she decided to come to the ED. Admitted for shock on levophed. Subjective 10/09/21  Hospital Day: 1  No events overnight  Remains on levophed  No overnight complaints. ROS:A comprehensive review of systems was negative except for that written in the HPI. Events and notes from last 24 hours reviewed. Care plan discussed on multidisciplinary rounds.   Patient Active Problem List   Diagnosis Code    Nausea & vomiting R11.2    Pyelonephritis H64    Complicated urinary tract infection N39.0    Anemia of chronic renal failure N18.9, D63.1    Anemia D64.9    Hypotension I95.9    UTI (urinary tract infection) N39.0    Type 2 diabetes mellitus, without long-term current use of insulin ([facility]) E11.9    CKD (chronic kidney disease) stage 5, GFR less than 15 ml/min ([facility]) N18.5    Acquired hypothyroidism E03.9    GERD (gastroesophageal reflux disease) O99.3    Complicated UTI (urinary tract infection) N39.0    Disease of the lower urinary tract N39.9    Sepsis ([facility]) A41.9    Tachycardia R00.0    ESRD (end stage renal disease) on dialysis ([facility]) N18.6, Z99.2    Hydronephrosis N13.30    Septic shock ([facility]) A41.9, R65.21       Vital Signs:  Visit Vitals  BP (!) 109/44   Pulse 76   Temp 97.6 °F (36.4 °C)   Resp 17   Ht 5' 2\" (1.575 m)   Wt 94 kg (207 lb 3.7 oz)   SpO2 94%   BMI 37.90 kg/m²       O2 Device: None (Room air)       Temp (24hrs), Av.6 °F (36.4 °C), Min:97.6 °F (36.4 °C), Max:97.6 °F (36.4 °C)       Intake/Output:   Last shift:      No intake/output data recorded. Last 3 shifts: No intake/output data recorded.   No intake or output data in the 24 hours ending 10/09/21 0513     Ventilator Settings:                Current Facility-Administered Medications   Medication Dose Route Frequency    meropenem (MERREM) 500 mg in sterile water (preservative free) 20 mL IV syringe  500 mg IntraVENous Q24H    VANCOMYCIN INFORMATION NOTE   Other Rx Dosing/Monitoring    NOREPINephrine (LEVOPHED) 8 mg in 5% dextrose 250mL (32 mcg/mL) infusion  0.5-30 mcg/min IntraVENous TITRATE    midodrine (PROAMATINE) tablet 5 mg  5 mg Oral TID WITH MEALS    insulin lispro (HUMALOG) injection   SubCUTAneous Q6H    heparin (porcine) injection 5,000 Units  5,000 Units SubCUTAneous Q8H         Telemetry: [x]Sinus []A-flutter []Paced    []A-fib []Multiple PVCs                  Physical Exam:      General:NAD, appears stated age   HEENT:  Anicteric sclerae; pink palpebral conjunctivae; mucosa moist  Resp:  Symmetrical chest expansion, no accessory muscle use; good airway entry; no stidor  CV:  S1, S2 present; regular rate and rhythm  GI:  Abdomen soft, non-tender;non distended   Extremities:  Normal appearing x4  Skin:  Warm; no rashes/ lesions noted, normal turgor/cap refill   Neurologic:  Non-focal  Devices:  CVL      DATA:  MAR reviewed and pertinent medications noted or modified as needed    Labs:  Recent Labs     10/08/21  1550   WBC 7.9   HGB 10.9* HCT 34.2*        Recent Labs     10/08/21  1550   *   K 3.3*   CL 97*   CO2 32   *   BUN 13   CREA 3.36*   CA 8.7   MG 2.1   ALB 3.1*   ALT 12*   INR 1.1     No results for input(s): PH, PCO2, PO2, HCO3, FIO2 in the last 72 hours. No results for input(s): FIO2I, IFO2, HCO3I, IHCO3, HCOPOC, PCO2I, PCOPOC, IPHI, PHI, PHPOC, PO2I, PO2POC in the last 72 hours. No lab exists for component: IPOC2    Imaging:  [x]   I have personally reviewed the patients radiographs and reports  XR Results (most recent):  XR Results (most recent):  Results from Hospital Encounter encounter on 10/08/21    XR CHEST PORT    Narrative  Portable Chest    CPT CODE: 05453    HISTORY: Line placements. FINDINGS:    Compared with study done earlier the same day at 1521 hours. Right internal jugular line tip at the proximal SVC. Additional wires overlie  the patient. Surgical clips at the epigastrium. Ectasia of the descending  thoracic aorta elevated diaphragm on the right stable. No pneumothorax, effusion  or new lung consolidation. Impression  Right CVL tip at the proximal SVC. No pneumothorax. CT Results (most recent):  Results from Hospital Encounter encounter on 10/08/21    CT HEAD WO CONT    Narrative  CT Head without contrast    HISTORY: Hypotension, dizziness. COMPARISON: None    TECHNIQUE:  Axial imaging from the skull base to the vertex was performed  without intravenous contrast.  Coronal and sagittal reformations. All CT scans  are performed using dose optimization techniques as appropriate to the performed  exam including the following: Automated exposure control, adjustment of mA  and/or kV according to patient size, and use of iterative reconstructive  technique. FINDINGS:    No intracranial hemorrhage. No evidence of midline shift, mass effect, or  obvious mass lesion.   There is preservation of the gray and white matter  differentiation, no acute infarct (Please note that CT is less sensitive in the  detection of early infarct). Moderately severe periventricular white matter changes of the cerebrum. The size of the ventricles and sulci are normal.  No extra axial fluid  collections. Heavily calcified intracranial carotid arteries. Visualized paranasal sinuses are clear. No evidence for skull fracture. Impression  No CT finding for acute territorial infarct or acute intracranial hemorrhage. Fairly extensive periventricular white matter change. Nonspecific but often  associated with chronic microvascular ischemic disease. 11/11/20    ECHO ADULT COMPLETE 11/11/2020 11/11/2020    Interpretation Summary  · LV: Estimated LVEF is 55 - 60%. Normal cavity size, systolic function (ejection fraction normal) and diastolic function. Mild to moderate hypertrophy. Wall motion: normal.  · LA: Left Atrium volume index is 31.48 mL/m2. · PA: Pulmonary arterial systolic pressure is 22 mmHg. Signed by: Neli Cabrera MD on 11/11/2020 12:50 PM       IMPRESSION:   · Shock - procal remains low, no other s/o sepsis, ? Worsening chronic hypotension. · Patient has R ureteral stricture managed with nephrostomy tube initially and converted to stent 6 mo ago. S/P exchange of chronic R ureteral stent on 10/5.   · Lactic acidosis - resolved  · ESRD on HD - makes small amount of urine  · Chronic hypotension on midodrine  · Hx DM      Patient Active Problem List   Diagnosis Code    Nausea & vomiting R11.2    Pyelonephritis C36    Complicated urinary tract infection N39.0    Anemia of chronic renal failure N18.9, D63.1    Anemia D64.9    Hypotension I95.9    UTI (urinary tract infection) N39.0    Type 2 diabetes mellitus, without long-term current use of insulin ([facility]) E11.9    CKD (chronic kidney disease) stage 5, GFR less than 15 ml/min ([facility]) N18.5    Acquired hypothyroidism E03.9    GERD (gastroesophageal reflux disease) R70.7    Complicated UTI (urinary tract infection) N39.0    Disease of the lower urinary tract N39.9    Sepsis ([facility]) A41.9    Tachycardia R00.0    ESRD (end stage renal disease) on dialysis ([facility]) N18.6, Z99.2    Hydronephrosis N13.30    Septic shock ([facility]) A41.9, R65.21        RECOMMENDATIONS:   Neuro: no acute issues   Pulm: Aspiration precautions, HOB>30'. CVS:Monitor HD, systolic goal >32, patient midodrine restarted at higher dose  GI:  Diet , SUP while on pressors  Renal: Trend Cr, UOP. HD per nephro, florinef in [facility], patient reported not taking on 10/1 but was on end of September, restarted due to hypotension   Hem/Onc: Trend H/H, monitor for s/o active bleeding. Daily CBC. I/D:Trend WBCs and temperature curve. procal neg, merrem, vanco started in ED, BCx2, UC from 10/5 negative, f/u hydro of right ureter  Metabolic: Daily BMP, mag, phos. Trend lytes and replace per protocol. Endocrine:ACHS glucoses. SSI. Avoid hypoglycemia. Musc/Skin: no acute issues   Full Code  Discussed in interdisciplinary rounds     Best practice :    Glycemic control  IHI ICU bundles:   Central Line Bundle Followed     [facility] Vent patients- VAP bundle, aim to keep peak plateau pressure 02-52CO H2O  Sress ulcer prophylaxis. DVT prophylaxis. Need for Lines, chua assessed. Palliative care evaluation. Restraints need. High complexity decision making was performed during this consultation and evaluation. [x]       Pt is at high risk for further organ failure and dysfunction. Critical care time spent:  41  minutes with patient exclusive of procedures.     Odette Petersen PA-C   10/09/21  Pulmonary, Critical Care Medicine  [facility]

## 2021-10-09 NOTE — CONSULTS
UROLOGIC consult NOTE    Lulú Nunez  10/9/2021    ASSESSMENT/plan     UTI  Sepsis  ESRD on dialysis  Ureteral stricture S/P stent change 10/5  Urine cultures pending  Abx per medicine  Continue to follow with you  US no hydro              SUBJECTIVE:     Chief Complaint   Patient presents with    Hypotension    Nausea         History of Present Illness:     Lulú Nunez is a 64 y.o.  female who we were asked to see secondary to urosepsis . She has a Hx of ESRD and ureteral stricture recently had stent change. She has a long history of recurrent UTI;s she presented to the [facility] fro dialysis with low BP  She was feeling some lightheadedness  US shows stent in position without hydro. Review of Systems:  Constitutional: Fever:   Skin: Rash:    HEENT: Hearing difficulty:   Eyes: Blurred vision:   Cardiovascular: Chest pain:   Respiratory: Shortness of breath:   Gastrointestinal: Nausea/vomiting:   Musculoskeletal: Back pain:   Neurological: Weakness:   Psychological: Memory loss:   Comments/additional findings:       Past Medical History:   Diagnosis Date    Acidosis     Anemia     Arteriovenous fistula (HCC)     Chronic kidney disease     on HD at [facility] on [address] on MWF. [phone]    Chronic pain     CKD (chronic kidney disease)     Diabetes ([facility] [address]     no meds now    ESRD (end stage renal disease) on dialysis ([facility] [address] 9/9/2021    HLD (hyperlipidemia)     HTN (hypertension)     Hyperparathyroidism due to renal insufficiency (HCC)     Hypothyroid     Kidney stone     Lung mass     Recurrent UTI     Ureter, stricture     Uric acid nephrolithiasis     Urinary incontinence        Past Surgical History:   Procedure Laterality Date    HX APPENDECTOMY      HX CHOLECYSTECTOMY      HX GASTRIC BYPASS      Gastric stapling    HX KNEE ARTHROSCOPY      HX UROLOGICAL      right PCN placement    HX UROLOGICAL  07/23/2018    RIGHT URETEROSCOPY WITH HOLMIUM LASER    IR EXCHANGE NEPHRO PERC LT SI  2/21/2020    IR EXCHANGE NEPHRO PERC RT SI  4/13/2020    IR EXCHANGE NEPHRO PERC RT SI  7/17/2020    IR NEPHROSTOMY PERC RT PLC CATH  SI  10/14/2020    IR NEPHROURETERAL PERC RT PLC CATH NEW ACCESS  SI  4/30/2020    IA INTRO CATH DIALYSIS CIRCUIT DX ANGRPH FLUOR S&I Left 9/24/2020    FISTULOGRAM LEFT/poss permanent catheter placement performed by London Joshi MD at [facility] CATH LAB    VASCULAR SURGERY PROCEDURE UNLIST      lef AVF       Allergies   Allergen Reactions    Ciprofloxacin Hives    Cyclopentolate Unknown (comments)    Iron Sucrose Diarrhea    Midodrine Unknown (comments)     Denies 910/1/21    Statins-Hmg-Coa Reductase Inhibitors Other (comments)     Body ache       Current Facility-Administered Medications   Medication Dose Route Frequency    famotidine (PF) (PEPCID) 20 mg in 0.9% sodium chloride 10 mL injection  20 mg IntraVENous Q24H    fludrocortisone (FLORINEF) tablet 0.1 mg  0.1 mg Oral DAILY    cefTRIAXone (ROCEPHIN) 2 g in sterile water (preservative free) 20 mL IV syringe  2 g IntraVENous Q24H    Gentamicin: Pharmacy to dose   Other Rx Dosing/Monitoring    gentamicin (GARAMYCIN) 130 mg in 0.9% sodium chloride 100 mL IVPB  130 mg IntraVENous ONCE    NOREPINephrine (LEVOPHED) 8 mg in 5% dextrose 250mL (32 mcg/mL) infusion  0.5-30 mcg/min IntraVENous TITRATE    VANCOMYCIN INFORMATION NOTE   Other Rx Dosing/Monitoring    midodrine (PROAMATINE) tablet 5 mg  5 mg Oral TID WITH MEALS    diphenhydrAMINE-zinc acetate 2%-0.1% (BENADRYL) cream   Topical TID PRN    insulin lispro (HUMALOG) injection   SubCUTAneous Q6H    glucose chewable tablet 16 g  4 Tablet Oral PRN    glucagon (GLUCAGEN) injection 1 mg  1 mg IntraMUSCular PRN    dextrose (D50W) injection syrg 12.5-25 g  25-50 mL IntraVENous PRN    heparin (porcine) injection 5,000 Units  5,000 Units SubCUTAneous Q8H     Current Outpatient Medications   Medication Sig    vitamin B complex (B COMPLEX VITAMINS PO) Take 1 Tablet by mouth daily.  heparin sod,porcine/0.9 % NaCl (heparin flush, porcine, in ns) 100 unit/mL kit Heparin Sodium (Porcine) 1,000 Units/mL Systemic    doxercalciferol (HECTOROL IV) 4 mcg.  HYDROmorphone (DILAUDID) 2 mg tablet as needed.  meclizine (ANTIVERT) 25 mg tablet Take 25 mg by mouth as needed.  methoxy peg-epoetin beta (MIRCERA INJECTION) 30 mcg.  Narcan 4 mg/actuation nasal spray     vitamin D3-folic acid 9,688 unit- 1 mg tab Take 1 Tablet by mouth.  midodrine (PROAMATINE) 2.5 mg tablet Take 1 Tablet by mouth three (3) times daily (with meals).  DULoxetine (Cymbalta) 20 mg capsule Take 20 mg by mouth daily.  fludrocortisone (FLORINEF) 0.1 mg tablet Take 1 Tab by mouth daily. (Patient not taking: Reported on 10/1/2021)    b complex vitamins (Vitamins B Complex) tablet Take 1 Tab by mouth daily.  estradioL (Estrace) 0.01 % (0.1 mg/gram) vaginal cream Apply a fingertip amount around the urethra three times a week.  biotin 1,000 mcg chew Take 1 Tab by mouth daily.  cyanocobalamin 1,000 mcg tablet Take 1,000 mcg by mouth daily.  gabapentin (NEURONTIN) 100 mg capsule Take 100 mg by mouth nightly. AS needed    lactobacillus sp. 50 billion cpu (BIO-K PLUS) 50 billion cell -375 mg cap capsule Take 1 Cap by mouth daily.  tamsulosin (FLOMAX) 0.4 mg capsule Take 1 Cap by mouth daily.  sodium bicarbonate 650 mg tablet Take 2 Tabs by mouth three (3) times daily. Indications: excess body acid (Patient not taking: Reported on 10/1/2021)    acetaminophen (TYLENOL) 325 mg tablet Take 650 mg by mouth two (2) times a day.  allopurinoL (ZYLOPRIM) 100 mg tablet Take 200 mg by mouth daily.  ascorbic acid, vitamin C, (VITAMIN C) 500 mg tablet Take 500 mg by mouth daily.  calcitRIOL (ROCALTROL) 0.25 mcg capsule Take 0.25 mcg by mouth daily.  cholecalciferol (VITAMIN D3) (2,000 UNITS /50 MCG) cap capsule Take 2,000 Units by mouth two (2) times a day.  Take two tabs a total of 4000 units    latanoprost (XALATAN) 0.005 % ophthalmic solution Administer 1 Drop to both eyes nightly. One drop at bedtime    levothyroxine (SYNTHROID) 125 mcg tablet Take 125 mcg by mouth Daily (before breakfast).  omeprazole (PRILOSEC) 20 mg capsule Take 20 mg by mouth daily.  ondansetron (ZOFRAN ODT) 4 mg disintegrating tablet Take 4 mg by mouth every eight (8) hours as needed for Nausea or Vomiting.  vit B Cmplx 3-FA-Vit C-Biotin (NEPHRO HOWIE RX) 1- mg-mg-mcg tablet Take 1 Tab by mouth daily. Family History   Problem Relation Age of Onset    Heart Surgery Sister        Social History     Socioeconomic History    Marital status: SINGLE     Spouse name: Not on file    Number of children: Not on file    Years of education: Not on file    Highest education level: Not on file   Tobacco Use    Smoking status: Never Smoker    Smokeless tobacco: Never Used   Vaping Use    Vaping Use: Never used   Substance and Sexual Activity    Alcohol use: Never    Drug use: Never     Social Determinants of Health     Financial Resource Strain:     Difficulty of Paying Living Expenses:    Food Insecurity:     Worried About Running Out of Food in the Last Year:     Ran Out of Food in the Last Year:    Transportation Needs:     Lack of Transportation (Medical):      Lack of Transportation (Non-Medical):    Physical Activity:     Days of Exercise per Week:     Minutes of Exercise per Session:    Stress:     Feeling of Stress :    Social Connections:     Frequency of Communication with Friends and Family:     Frequency of Social Gatherings with Friends and Family:     Attends Jain Services:     Active Member of Clubs or Organizations:     Attends Club or Organization Meetings:     Marital Status:          OBJECTIVE:     Physical Exam:  Visit Vitals  BP (!) 101/39   Pulse 79   Temp 97.6 °F (36.4 °C)   Resp 17   Ht 5' 2\" (1.575 m)   Wt 207 lb 3.7 oz (94 kg)   SpO2 96%   BMI 37.90 kg/m²       General: Alert and without distress  Eyes: Conjunctivae clear. Neck: No carotid bruit heard  Back: No CVA tenderness. Lungs: Clear to auscultation bilaterally. Heart: Regular rate   Abdomen: Soft, non-tender. BMusculoskeletal: Patient ambulates without assistance  Extremities: Warm, + dorsalis pedis and posterior tibial     Psych: Appropriate mood. LABS/ IMAGING:     Labs Reviewed today with the patient include:   Recent Results (from the past 24 hour(s))   POC LACTIC ACID    Collection Time: 10/08/21  7:42 PM   Result Value Ref Range    Lactic Acid (POC) 1.50 0.40 - 2.00 mmol/L   VANCOMYCIN, RANDOM    Collection Time: 10/09/21  4:55 AM   Result Value Ref Range    Vancomycin, random 15.0 5.0 - 40.0 UG/ML   CBC WITH AUTOMATED DIFF    Collection Time: 10/09/21  4:55 AM   Result Value Ref Range    WBC 10.9 4.6 - 13.2 K/uL    RBC 3.51 (L) 4.20 - 5.30 M/uL    HGB 10.5 (L) 12.0 - 16.0 g/dL    HCT 33.9 (L) 35.0 - 45.0 %    MCV 96.6 78.0 - 100.0 FL    MCH 29.9 24.0 - 34.0 PG    MCHC 31.0 31.0 - 37.0 g/dL    RDW 16.1 (H) 11.6 - 14.5 %    PLATELET 150 480 - 772 K/uL    MPV 10.7 9.2 - 11.8 FL    NEUTROPHILS 60 40 - 73 %    LYMPHOCYTES 26 21 - 52 %    MONOCYTES 11 (H) 3 - 10 %    EOSINOPHILS 1 0 - 5 %    BASOPHILS 1 0 - 2 %    ABS. NEUTROPHILS 6.6 1.8 - 8.0 K/UL    ABS. LYMPHOCYTES 2.8 0.9 - 3.6 K/UL    ABS. MONOCYTES 1.2 0.05 - 1.2 K/UL    ABS. EOSINOPHILS 0.2 0.0 - 0.4 K/UL    ABS.  BASOPHILS 0.1 0.0 - 0.1 K/UL    DF AUTOMATED     METABOLIC PANEL, BASIC    Collection Time: 10/09/21  4:55 AM   Result Value Ref Range    Sodium 137 136 - 145 mmol/L    Potassium 4.4 3.5 - 5.5 mmol/L    Chloride 97 (L) 100 - 111 mmol/L    CO2 30 21 - 32 mmol/L    Anion gap 10 3.0 - 18 mmol/L    Glucose 142 (H) 74 - 99 mg/dL    BUN 20 (H) 7.0 - 18 MG/DL    Creatinine 4.51 (H) 0.6 - 1.3 MG/DL    BUN/Creatinine ratio 4 (L) 12 - 20      GFR est AA 11 (L) >60 ml/min/1.73m2    GFR est non-AA 9 (L) >60 ml/min/1.73m2    Calcium 8.7 8.5 - 10.1 MG/DL   MAGNESIUM    Collection Time: 10/09/21  4:55 AM   Result Value Ref Range    Magnesium 2.3 1.6 - 2.6 mg/dL   PHOSPHORUS    Collection Time: 10/09/21  4:55 AM   Result Value Ref Range    Phosphorus 5.4 (H) 2.5 - 4.9 MG/DL   PROCALCITONIN    Collection Time: 10/09/21  6:31 AM   Result Value Ref Range    Procalcitonin 0.81 ng/mL   RESPIRATORY VIRUS PANEL W/COVID-19, PCR    Collection Time: 10/09/21  7:07 AM    Specimen: Nasopharyngeal   Result Value Ref Range    Adenovirus Not detected NOTD      Coronavirus 229E Not detected NOTD      Coronavirus HKU1 Not detected NOTD      Coronavirus CVNL63 Not detected NOTD      Coronavirus OC43 Not detected NOTD      SARS-CoV-2, PCR Not detected NOTD      Metapneumovirus Not detected NOTD      Rhinovirus and Enterovirus Not detected NOTD      Influenza A Not detected NOTD      Influenza B Not detected NOTD      Parainfluenza 1 Not detected NOTD      Parainfluenza 2 Not detected NOTD      Parainfluenza 3 Not detected NOTD      Parainfluenza virus 4 Not detected NOTD      RSV by PCR Not detected NOTD      B. parapertussis, PCR Not detected NOTD      Bordetella pertussis - PCR Not detected NOTD      Chlamydophila pneumoniae DNA, QL, PCR Not detected NOTD      Mycoplasma pneumoniae DNA, QL, PCR Not detected NOTD     GLUCOSE, POC    Collection Time: 10/09/21 12:42 PM   Result Value Ref Range    Glucose (POC) 122 (H) 70 - 110 mg/dL   GLUCOSE, POC    Collection Time: 10/09/21  3:56 PM   Result Value Ref Range    Glucose (POC) 98 70 - 110 mg/dL     Renal US no hydro stent not visualized

## 2021-10-09 NOTE — PROGRESS NOTES
Reason for Renal Dosing:  Per Renal Dosing Policy    Patient clinical status and labs ordered/reviewed. Pt Weight Weight: 94 kg (207 lb 3.7 oz)   Serum Creatinine Lab Results   Component Value Date/Time    Creatinine 3.36 (H) 10/08/2021 03:50 PM       Creatinine Clearance Estimated Creatinine Clearance: 14.7 mL/min (A) (based on SCr of 3.36 mg/dL (H)). BUN Lab Results   Component Value Date/Time    BUN 13 10/08/2021 03:50 PM    BUN, POC 19 (H) 09/24/2020 08:45 AM       WBC Lab Results   Component Value Date/Time    WBC 7.9 10/08/2021 03:50 PM      Temperature Temp: 97.6 °F (36.4 °C)   HR Pulse (Heart Rate): 76     BP BP: (!) 109/44           Drug type: Non-Antibiotic    Drug/dose: Famotidine 20 mg IV q12h was adjusted to: 20 mg IV q24h (for eCrCl = 14.7 mL/min)    Continue to monitor.     Signed ROBERT Wright [facility]  Date 10/9/2021  Time 5:30 AM

## 2021-10-10 ENCOUNTER — APPOINTMENT (OUTPATIENT)
Dept: NON INVASIVE DIAGNOSTICS | Age: 76
DRG: 853 | End: 2021-10-10
Attending: NURSE PRACTITIONER
Payer: MEDICARE

## 2021-10-10 LAB
ANION GAP SERPL CALC-SCNC: 9 MMOL/L (ref 3–18)
BASOPHILS # BLD: 0.1 K/UL (ref 0–0.1)
BASOPHILS NFR BLD: 1 % (ref 0–2)
BUN SERPL-MCNC: 37 MG/DL (ref 7–18)
BUN/CREAT SERPL: 6 (ref 12–20)
CALCIUM SERPL-MCNC: 8.6 MG/DL (ref 8.5–10.1)
CHLORIDE SERPL-SCNC: 98 MMOL/L (ref 100–111)
CO2 SERPL-SCNC: 31 MMOL/L (ref 21–32)
CORTIS SERPL-MCNC: 22.3 UG/DL
CREAT SERPL-MCNC: 6.25 MG/DL (ref 0.6–1.3)
DIFFERENTIAL METHOD BLD: ABNORMAL
ECHO LV INTERNAL DIMENSION DIASTOLIC: 4.3 CM (ref 3.9–5.3)
ECHO LV INTERNAL DIMENSION SYSTOLIC: 2.52 CM
ECHO LV IVSD: 1.47 CM (ref 0.6–0.9)
ECHO LV MASS 2D: 221.1 G (ref 67–162)
ECHO LV MASS INDEX 2D: 113.9 G/M2 (ref 43–95)
ECHO LV POSTERIOR WALL DIASTOLIC: 1.24 CM (ref 0.6–0.9)
ECHO RV TAPSE: 2.27 CM (ref 1.5–2)
ECHO TV REGURGITANT MAX VELOCITY: 258.86 CM/S
ECHO TV REGURGITANT PEAK GRADIENT: 26.8 MMHG
EOSINOPHIL # BLD: 0.2 K/UL (ref 0–0.4)
EOSINOPHIL NFR BLD: 2 % (ref 0–5)
ERYTHROCYTE [DISTWIDTH] IN BLOOD BY AUTOMATED COUNT: 15.9 % (ref 11.6–14.5)
GLUCOSE BLD STRIP.AUTO-MCNC: 111 MG/DL (ref 70–110)
GLUCOSE BLD STRIP.AUTO-MCNC: 112 MG/DL (ref 70–110)
GLUCOSE BLD STRIP.AUTO-MCNC: 120 MG/DL (ref 70–110)
GLUCOSE BLD STRIP.AUTO-MCNC: 88 MG/DL (ref 70–110)
GLUCOSE SERPL-MCNC: 101 MG/DL (ref 74–99)
HCT VFR BLD AUTO: 29.8 % (ref 35–45)
HGB BLD-MCNC: 9.2 G/DL (ref 12–16)
LYMPHOCYTES # BLD: 1.4 K/UL (ref 0.9–3.6)
LYMPHOCYTES NFR BLD: 18 % (ref 21–52)
MAGNESIUM SERPL-MCNC: 2.4 MG/DL (ref 1.6–2.6)
MCH RBC QN AUTO: 29.6 PG (ref 24–34)
MCHC RBC AUTO-ENTMCNC: 30.9 G/DL (ref 31–37)
MCV RBC AUTO: 95.8 FL (ref 78–100)
MONOCYTES # BLD: 0.8 K/UL (ref 0.05–1.2)
MONOCYTES NFR BLD: 10 % (ref 3–10)
NEUTS SEG # BLD: 5.5 K/UL (ref 1.8–8)
NEUTS SEG NFR BLD: 69 % (ref 40–73)
PHOSPHATE SERPL-MCNC: 6.2 MG/DL (ref 2.5–4.9)
PLATELET # BLD AUTO: 254 K/UL (ref 135–420)
PMV BLD AUTO: 9.2 FL (ref 9.2–11.8)
POTASSIUM SERPL-SCNC: 5.3 MMOL/L (ref 3.5–5.5)
RBC # BLD AUTO: 3.11 M/UL (ref 4.2–5.3)
SODIUM SERPL-SCNC: 138 MMOL/L (ref 136–145)
VANCOMYCIN SERPL-MCNC: 20.9 UG/ML (ref 5–40)
WBC # BLD AUTO: 8 K/UL (ref 4.6–13.2)

## 2021-10-10 PROCEDURE — 82533 TOTAL CORTISOL: CPT

## 2021-10-10 PROCEDURE — 74011000250 HC RX REV CODE- 250: Performed by: INTERNAL MEDICINE

## 2021-10-10 PROCEDURE — 74011250636 HC RX REV CODE- 250/636: Performed by: PHYSICIAN ASSISTANT

## 2021-10-10 PROCEDURE — 99291 CRITICAL CARE FIRST HOUR: CPT | Performed by: INTERNAL MEDICINE

## 2021-10-10 PROCEDURE — 74011000258 HC RX REV CODE- 258: Performed by: PHYSICIAN ASSISTANT

## 2021-10-10 PROCEDURE — 80202 ASSAY OF VANCOMYCIN: CPT

## 2021-10-10 PROCEDURE — 93321 DOPPLER ECHO F-UP/LMTD STD: CPT

## 2021-10-10 PROCEDURE — 84100 ASSAY OF PHOSPHORUS: CPT

## 2021-10-10 PROCEDURE — 83735 ASSAY OF MAGNESIUM: CPT

## 2021-10-10 PROCEDURE — 80048 BASIC METABOLIC PNL TOTAL CA: CPT

## 2021-10-10 PROCEDURE — 82962 GLUCOSE BLOOD TEST: CPT

## 2021-10-10 PROCEDURE — 74011250636 HC RX REV CODE- 250/636: Performed by: INTERNAL MEDICINE

## 2021-10-10 PROCEDURE — 74011250637 HC RX REV CODE- 250/637: Performed by: PHYSICIAN ASSISTANT

## 2021-10-10 PROCEDURE — 65270000029 HC RM PRIVATE

## 2021-10-10 PROCEDURE — 74011000250 HC RX REV CODE- 250: Performed by: PHYSICIAN ASSISTANT

## 2021-10-10 PROCEDURE — 87040 BLOOD CULTURE FOR BACTERIA: CPT

## 2021-10-10 PROCEDURE — 85025 COMPLETE CBC W/AUTO DIFF WBC: CPT

## 2021-10-10 PROCEDURE — 36415 COLL VENOUS BLD VENIPUNCTURE: CPT

## 2021-10-10 RX ORDER — MIDODRINE HYDROCHLORIDE 5 MG/1
10 TABLET ORAL
Status: DISCONTINUED | OUTPATIENT
Start: 2021-10-10 | End: 2021-10-14 | Stop reason: HOSPADM

## 2021-10-10 RX ADMIN — MIDODRINE HYDROCHLORIDE 10 MG: 5 TABLET ORAL at 09:28

## 2021-10-10 RX ADMIN — CEFEPIME HYDROCHLORIDE 1 G: 1 INJECTION, POWDER, FOR SOLUTION INTRAMUSCULAR; INTRAVENOUS at 16:42

## 2021-10-10 RX ADMIN — DEXTROSE MONOHYDRATE 1 MCG/MIN: 50 INJECTION, SOLUTION INTRAVENOUS at 16:46

## 2021-10-10 RX ADMIN — HEPARIN SODIUM 5000 UNITS: 5000 INJECTION INTRAVENOUS; SUBCUTANEOUS at 06:42

## 2021-10-10 RX ADMIN — FAMOTIDINE 20 MG: 10 INJECTION INTRAVENOUS at 09:28

## 2021-10-10 RX ADMIN — FLUDROCORTISONE ACETATE 0.1 MG: 0.1 TABLET ORAL at 09:27

## 2021-10-10 RX ADMIN — HEPARIN SODIUM 5000 UNITS: 5000 INJECTION INTRAVENOUS; SUBCUTANEOUS at 16:42

## 2021-10-10 RX ADMIN — HEPARIN SODIUM 5000 UNITS: 5000 INJECTION INTRAVENOUS; SUBCUTANEOUS at 22:00

## 2021-10-10 RX ADMIN — MIDODRINE HYDROCHLORIDE 10 MG: 5 TABLET ORAL at 16:42

## 2021-10-10 RX ADMIN — MIDODRINE HYDROCHLORIDE 10 MG: 5 TABLET ORAL at 12:18

## 2021-10-10 NOTE — PROGRESS NOTES
Urology Progress Note        Assessment/Plan:     Patient Active Problem List   Diagnosis Code    Nausea & vomiting R11.2    Pyelonephritis U89    Complicated urinary tract infection N39.0    Anemia of chronic renal failure N18.9, D63.1    Anemia D64.9    Hypotension I95.9    UTI (urinary tract infection) N39.0    Type 2 diabetes mellitus, without long-term current use of insulin ([facility]) E11.9    CKD (chronic kidney disease) stage 5, GFR less than 15 ml/min ([facility]) N18.5    Acquired hypothyroidism E03.9    GERD (gastroesophageal reflux disease) V15.3    Complicated UTI (urinary tract infection) N39.0    Disease of the lower urinary tract N39.9    Sepsis ([facility]) A41.9    Tachycardia R00.0    ESRD (end stage renal disease) on dialysis ([facility]) N18.6, Z99.2    Hydronephrosis N13.30    Septic shock ([facility]) A41.9, R65.21       UTI/urosepsis  Cultures pending   Continue abx per ID  Ureteral stricture S/P stent change 10/5    no hydro   Will continue to follow    Nolberto Wade MD    [phone]      Subjective:     Daily Progress Note: 10/10/2021 5:08 PM    [facility] is doing well without complaints    Objective:     Visit Vitals  BP (!) 85/55   Pulse 70   Temp 97.9 °F (36.6 °C)   Resp 21   Ht 5' 2\" (1.575 m)   Wt 207 lb (93.9 kg)   SpO2 97%   BMI 37.86 kg/m²        Temp (24hrs), Av °F (36.7 °C), Min:97.9 °F (36.6 °C), Max:98.2 °F (36.8 °C)      Intake and Output:  No intake/output data recorded. No intake/output data recorded. PHYSICAL EXAMINATION:   Visit Vitals  BP (!) 85/55   Pulse 70   Temp 97.9 °F (36.6 °C)   Resp 21   Ht 5' 2\" (1.575 m)   Wt 207 lb (93.9 kg)   SpO2 97%   BMI 37.86 kg/m²     Constitutional: Well developed, well nourished. No acute distress. HEENT: Normocephalic, Atraumatic, EOM's intact   CV:  no edema  Respiratory: No respiratory distress or difficulties breathing   Abdomen:  Soft and non-tender    Skin: No evidence of jaundice.   Normal color  Neuro/Psych: Alert and oriented. Affect appropriate. Labs:     Labs: Results:   Chemistry    Recent Labs     10/10/21  0511 10/09/21  0455 10/08/21  1550   * 142* 144*    137 135*   K 5.3 4.4 3.3*   CL 98* 97* 97*   CO2 31 30 32   BUN 37* 20* 13   CREA 6.25* 4.51* 3.36*   CA 8.6 8.7 8.7   AGAP 9 10 6   BUCR 6* 4* 4*   AP  --   --  139*   TP  --   --  7.9   ALB  --   --  3.1*   GLOB  --   --  4.8*   AGRAT  --   --  0.6*      CBC w/Diff Recent Labs     10/10/21  0937 10/09/21  0455 10/08/21  1550   WBC 8.0 10.9 7.9   RBC 3.11* 3.51* 3.62*   HGB 9.2* 10.5* 10.9*   HCT 29.8* 33.9* 34.2*    281 247   GRANS 69 60 74*   LYMPH 18* 26 15*   EOS 2 1 1      Cultures Recent Labs     10/08/21  1550 10/08/21  1535   CULT NO GROWTH 2 DAYS GRAM POSITIVE COCCI IN CLUSTERS GROWING IN 1 OF 2 BOTTLES DRAWN (SITE=[facility])*  REMAINING BOTTLE(S) HAS/HAVE NO GROWTH SO FAR  PRELIMINARY REPORT OF  GPC IN CLUSTERS  CALLED TO AND READ BACK BY  PA E TRACT ER ON [id number] AT 2202 TO 1396. All Micro Results     Procedure Component Value Units Date/Time    CULTURE, BLOOD [[id number]] Collected: 10/10/21 1631    Order Status: Completed Specimen: Blood Updated: 10/10/21 1642    CULTURE, URINE [[id number]] Collected: 10/08/21 1915    Order Status: Completed Specimen: Urine from Clean catch Updated: 10/10/21 1234    CULTURE, BLOOD [[id number]]  (Abnormal) Collected: 10/08/21 1535    Order Status: Completed Specimen: Blood Updated: 10/10/21 1148     Special Requests: NO SPECIAL REQUESTS        GRAM STAIN       AEROBIC BOTTLE GRAM POSITIVE COCCI IN GROUPS                  SMEAR CALLED TO AND CORRECTLY REPEATED BY: NOVA GUO ON 09OCT21 AT 2202 HRS TO 1396.            Culture result:       GRAM POSITIVE COCCI IN CLUSTERS GROWING IN 1 OF 2 BOTTLES DRAWN (SITE=[facility])                  REMAINING BOTTLE(S) HAS/HAVE NO GROWTH SO FAR                  PRELIMINARY REPORT OF  GPC IN CLUSTERS  CALLED TO AND READ BACK BY  PA E TRACT ER ON [id number] AT 2202 TO 0439.        CULTURE, BLOOD [[id number]] Collected: 10/08/21 1550    Order Status: Completed Specimen: Blood Updated: 10/10/21 0727     Special Requests: NO SPECIAL REQUESTS        Culture result: NO GROWTH 2 DAYS       CULTURE, URINE [[id number]] Collected: 10/09/21 1415    Order Status: Canceled Specimen: Cath Urine     RESPIRATORY VIRUS PANEL W/COVID-19, PCR [[id number]] Collected: 10/09/21 0707    Order Status: Completed Specimen: Nasopharyngeal Updated: 10/09/21 0825     Adenovirus Not detected        Coronavirus 229E Not detected        Coronavirus HKU1 Not detected        Coronavirus CVNL63 Not detected        Coronavirus OC43 Not detected        SARS-CoV-2, PCR Not detected        Metapneumovirus Not detected        Rhinovirus and Enterovirus Not detected        Influenza A Not detected        Influenza B Not detected        Parainfluenza 1 Not detected        Parainfluenza 2 Not detected        Parainfluenza 3 Not detected        Parainfluenza virus 4 Not detected        RSV by PCR Not detected        B. parapertussis, PCR Not detected        Bordetella pertussis - PCR Not detected        Chlamydophila pneumoniae DNA, QL, PCR Not detected        Mycoplasma pneumoniae DNA, QL, PCR Not detected               Urinalysis Color   Date Value Ref Range Status   10/08/2021 DARK YELLOW   Final     Appearance   Date Value Ref Range Status   10/08/2021 TURBID   Final     Specific gravity   Date Value Ref Range Status   10/08/2021 1.020 1.005 - 1.030   Final     pH (UA)   Date Value Ref Range Status   10/08/2021 8.0 5.0 - 8.0   Final     Protein   Date Value Ref Range Status   10/08/2021 >1,000 (A) NEG mg/dL Final     Ketone   Date Value Ref Range Status   10/08/2021 Negative NEG mg/dL Final     Bilirubin   Date Value Ref Range Status   10/08/2021 Negative NEG   Final     Blood   Date Value Ref Range Status   10/08/2021 LARGE (A) NEG   Final     Urobilinogen   Date Value Ref Range Status   10/08/2021 1.0 0.2 - 1.0 EU/dL Final     Nitrites   Date Value Ref Range Status   10/08/2021 Negative NEG   Final     Leukocyte Esterase   Date Value Ref Range Status   10/08/2021 LARGE (A) NEG   Final     Potassium   Date Value Ref Range Status   10/10/2021 5.3 3.5 - 5.5 mmol/L Final     Creatinine   Date Value Ref Range Status   10/10/2021 6.25 (H) 0.6 - 1.3 MG/DL Final     BUN   Date Value Ref Range Status   10/10/2021 37 (H) 7.0 - 18 MG/DL Final      PSA No results for input(s): PSA in the last 72 hours.    Coagulation Lab Results   Component Value Date/Time    Prothrombin time 13.6 10/08/2021 03:50 PM    Prothrombin time 14.2 10/14/2020 11:56 AM    INR 1.1 10/08/2021 03:50 PM    INR 1.1 10/14/2020 11:56 AM    aPTT 35.4 10/14/2020 11:56 AM    aPTT 46.9 (H) 07/16/2020 04:52 PM

## 2021-10-10 NOTE — PROGRESS NOTES
Consult received. Chart reviewed. ?septic shock due to partially treated uti versus exacerbation of chronic hypotension. Single positive blood cx for gpc- ? Contamination versus bloodstream infection   Recommendations   - recommend vancomycin, gentamicin. D/c ceftriaxone. Start Cefepime. -f/u urine, blood cx results  -repeat blood cx today  - f/u urology recommendations   -will modify abx based on above results, cl inical course   Full consult to follow. D/w dr Vicki Stuart.

## 2021-10-10 NOTE — PROGRESS NOTES
[facility]. Pulmonary, Critical Care, and Sleep Medicine    Name: Ugo Reza MRN: [id number]   : 1945 Hospital: [address] [place] [address] Dr   Date: 10/10/2021  Admission Date: 10/8/2021     Chart and notes reviewed. Data reviewed. I have evaluated all findings. [x]I have reviewed the flowsheet and previous days notes. []The patient is unable to give any meaningful history or review of systems because the patient is:  []Intubated []Sedated   []Unresponsive      [x]The patient is critically ill on      []Mechanical ventilation [x]Pressors   []BiPAP []         Interval HPI:Patient is a 64 y.o. female w/ pmhx of chronic hypotension on midodrine, ESRD on HD, R ureteral stricture with stent in place, recently exchanged on 10/5, and frequent UTIs who presented to the ED complaining of lightheadedness onset yesterday. She reported that she went to dialysis as usual and had a normal run of HD. She reports when her lightheadedness did not improve she decided to come to the ED. Admitted for shock on levophed. Subjective 10/10/21  Hospital Day: 2  No events overnight  Remains on levophed  States that she does have symptoms with blood pressures in the 80s such as faintness, dizziness, diaphoresis. Feels best with SBP ~100  Reports tremors with midodrine                 ROS:A comprehensive review of systems was negative except for that written in the HPI. Events and notes from last 24 hours reviewed. Care plan discussed on multidisciplinary rounds.   Patient Active Problem List   Diagnosis Code    Nausea & vomiting R11.2    Pyelonephritis B67    Complicated urinary tract infection N39.0    Anemia of chronic renal failure N18.9, D63.1    Anemia D64.9    Hypotension I95.9    UTI (urinary tract infection) N39.0    Type 2 diabetes mellitus, without long-term current use of insulin ([facility]) E11.9    CKD (chronic kidney disease) stage 5, GFR less than 15 ml/min ([facility]) N18.5    Acquired hypothyroidism E03.9    GERD (gastroesophageal reflux disease) P45.0    Complicated UTI (urinary tract infection) N39.0    Disease of the lower urinary tract N39.9    Sepsis ([facility]) A41.9    Tachycardia R00.0    ESRD (end stage renal disease) on dialysis ([facility]) N18.6, Z99.2    Hydronephrosis N13.30    Septic shock ([facility]) A41.9, R65.21       Vital Signs:  Visit Vitals  BP (!) 89/38   Pulse 74   Temp 98.2 °F (36.8 °C)   Resp 18   Ht 5' 2\" (1.575 m)   Wt 94 kg (207 lb 3.7 oz)   SpO2 96%   BMI 37.90 kg/m²       O2 Device: None (Room air)       Temp (24hrs), Av.1 °F (36.7 °C), Min:98 °F (36.7 °C), Max:98.2 °F (36.8 °C)       Intake/Output:   Last shift:      No intake/output data recorded.   Last 3 shifts: 10/08 0701 - 10/09 1900  In: 750 [I.V.:750]  Out: -   No intake or output data in the 24 hours ending 10/10/21 0646     Ventilator Settings:                Current Facility-Administered Medications   Medication Dose Route Frequency    midodrine (PROAMATINE) tablet 10 mg  10 mg Oral TID WITH MEALS    famotidine (PF) (PEPCID) 20 mg in 0.9% sodium chloride 10 mL injection  20 mg IntraVENous Q24H    fludrocortisone (FLORINEF) tablet 0.1 mg  0.1 mg Oral DAILY    cefTRIAXone (ROCEPHIN) 2 g in sterile water (preservative free) 20 mL IV syringe  2 g IntraVENous Q24H    Gentamicin: Pharmacy to dose   Other Rx Dosing/Monitoring    NOREPINephrine (LEVOPHED) 8 mg in 5% dextrose 250mL (32 mcg/mL) infusion  0.5-30 mcg/min IntraVENous TITRATE    VANCOMYCIN INFORMATION NOTE   Other Rx Dosing/Monitoring    insulin lispro (HUMALOG) injection   SubCUTAneous Q6H    heparin (porcine) injection 5,000 Units  5,000 Units SubCUTAneous Q8H         Telemetry: [x]Sinus []A-flutter []Paced    []A-fib []Multiple PVCs                  Physical Exam:      General:NAD, appears stated age   HEENT:  Anicteric sclerae; pink palpebral conjunctivae; mucosa moist  Resp:  Symmetrical chest expansion, no accessory muscle use; good airway entry; no stidor  CV:  S1, S2 present; regular rate and rhythm  GI:  Abdomen soft, non-tender;non distended   Extremities:  Normal appearing x4  Skin:  Warm; no rashes/ lesions noted, normal turgor/cap refill   Neurologic:  Non-focal  Devices:  CVL      DATA:  MAR reviewed and pertinent medications noted or modified as needed    Labs:  Recent Labs     10/09/21  0455 10/08/21  1550   WBC 10.9 7.9   HGB 10.5* 10.9*   HCT 33.9* 34.2*    247     Recent Labs     10/09/21  0455 10/08/21  1550    135*   K 4.4 3.3*   CL 97* 97*   CO2 30 32   * 144*   BUN 20* 13   CREA 4.51* 3.36*   CA 8.7 8.7   MG 2.3 2.1   PHOS 5.4*  --    ALB  --  3.1*   ALT  --  12*   INR  --  1.1     No results for input(s): PH, PCO2, PO2, HCO3, FIO2 in the last 72 hours. No results for input(s): FIO2I, IFO2, HCO3I, IHCO3, HCOPOC, PCO2I, PCOPOC, IPHI, PHI, PHPOC, PO2I, PO2POC in the last 72 hours. No lab exists for component: IPOC2    Imaging:  [x]   I have personally reviewed the patients radiographs and reports  XR Results (most recent):  XR Results (most recent):  Results from Hospital Encounter encounter on 10/08/21    XR CHEST PORT    Narrative  Portable Chest    CPT CODE: 79296    HISTORY: Line placements. FINDINGS:    Compared with study done earlier the same day at 1521 hours. Right internal jugular line tip at the proximal SVC. Additional wires overlie  the patient. Surgical clips at the epigastrium. Ectasia of the descending  thoracic aorta elevated diaphragm on the right stable. No pneumothorax, effusion  or new lung consolidation. Impression  Right CVL tip at the proximal SVC. No pneumothorax. CT Results (most recent):  Results from Hospital Encounter encounter on 10/08/21    CT HEAD WO CONT    Narrative  CT Head without contrast    HISTORY: Hypotension, dizziness.     COMPARISON: None    TECHNIQUE:  Axial imaging from the skull base to the vertex was performed  without intravenous contrast.  Coronal and sagittal reformations. All CT scans  are performed using dose optimization techniques as appropriate to the performed  exam including the following: Automated exposure control, adjustment of mA  and/or kV according to patient size, and use of iterative reconstructive  technique. FINDINGS:    No intracranial hemorrhage. No evidence of midline shift, mass effect, or  obvious mass lesion. There is preservation of the gray and white matter  differentiation, no acute infarct (Please note that CT is less sensitive in the  detection of early infarct). Moderately severe periventricular white matter changes of the cerebrum. The size of the ventricles and sulci are normal.  No extra axial fluid  collections. Heavily calcified intracranial carotid arteries. Visualized paranasal sinuses are clear. No evidence for skull fracture. Impression  No CT finding for acute territorial infarct or acute intracranial hemorrhage. Fairly extensive periventricular white matter change. Nonspecific but often  associated with chronic microvascular ischemic disease. 11/11/20    ECHO ADULT COMPLETE 11/11/2020 11/11/2020    Interpretation Summary  · LV: Estimated LVEF is 55 - 60%. Normal cavity size, systolic function (ejection fraction normal) and diastolic function. Mild to moderate hypertrophy. Wall motion: normal.  · LA: Left Atrium volume index is 31.48 mL/m2. · PA: Pulmonary arterial systolic pressure is 22 mmHg. Signed by: Dexter Rose MD on 11/11/2020 12:50 PM       IMPRESSION:   · Shock - procal remains low, no other s/o sepsis, ? Worsening chronic hypotension. · Patient has R ureteral stricture managed with nephrostomy tube initially and converted to stent 6 mo ago. S/P exchange of chronic R ureteral stent on 10/5.   · Lactic acidosis - resolved  · ESRD on HD - makes small amount of urine  · Chronic hypotension on midodrine, patient states she is symptomatic with BPs in 80s and feels best with BPs closer to 100's. Does report tremors/RLS with midodrine   · Hx DM      Patient Active Problem List   Diagnosis Code    Nausea & vomiting R11.2    Pyelonephritis L39    Complicated urinary tract infection N39.0    Anemia of chronic renal failure N18.9, D63.1    Anemia D64.9    Hypotension I95.9    UTI (urinary tract infection) N39.0    Type 2 diabetes mellitus, without long-term current use of insulin ([facility]) E11.9    CKD (chronic kidney disease) stage 5, GFR less than 15 ml/min ([facility]) N18.5    Acquired hypothyroidism E03.9    GERD (gastroesophageal reflux disease) S74.1    Complicated UTI (urinary tract infection) N39.0    Disease of the lower urinary tract N39.9    Sepsis ([facility]) A41.9    Tachycardia R00.0    ESRD (end stage renal disease) on dialysis ([facility]) N18.6, Z99.2    Hydronephrosis N13.30    Septic shock ([facility]) A41.9, R65.21        RECOMMENDATIONS:   Neuro: no acute issues   Pulm: Aspiration precautions, HOB>30'. CVS:Monitor HD, systolic goal >90, patient midodrine restarted at higher dose  GI:  Diet , SUP while on pressors  Renal: Trend Cr, UOP. HD per nephro, florinef in [facility], patient reported not taking on 10/1 but was on end of September, restarted due to hypotension, midodrine increased   Hem/Onc: Trend H/H, monitor for s/o active bleeding. Daily CBC. I/D:Trend WBCs and temperature curve. procal neg, merrem, vanco started in ED, BCx2, UC from 10/5 negative, f/u hydro of right ureter, does not appear septic overall, 1 BC (+) GPC overnight but no fevers or white count  Metabolic: Daily BMP, mag, phos. Trend lytes and replace per protocol. Endocrine:ACHS glucoses. SSI. Avoid hypoglycemia. Musc/Skin: no acute issues   Full Code  Discussed in interdisciplinary rounds     Best practice :    Glycemic control  IHI ICU bundles:   Central Line Bundle Followed     [facility] Vent patients- VAP bundle, aim to keep peak plateau pressure 61-63RL H2O  Sress ulcer prophylaxis. DVT prophylaxis.   Need for Lines, chua assessed. Palliative care evaluation. Restraints need. High complexity decision making was performed during this consultation and evaluation. [x]       Pt is at high risk for further organ failure and dysfunction. Critical care time spent:  31  minutes with patient exclusive of procedures.     Taco Aparicio PA-C   10/10/21  Pulmonary, Critical Care Medicine  [address] [place] [address] [facility]

## 2021-10-10 NOTE — PROGRESS NOTES
RENAL DAILY PROGRESS NOTE    Patient: Darell Kennedy               Sex: female          DOA: 10/8/2021  2:50 PM        YOB: 1945      Age:  64 y.o.        LOS:  LOS: 2 days     Subjective:     Darell Kennedy is a 64 y.o.  who presents with Septic shock ([facility] [address] [A41.9, R65.21]. Asked to evaluate for esrd,admitted with hypotension,hx of reccurent uti,s/p ureteral stent exchange few days ago  Chief complains: Patient denies nausea, vomiting, chest pain, dizziness, shortness of breath or headache.  - Reviewed last 24 hrs events     Current Facility-Administered Medications   Medication Dose Route Frequency    midodrine (PROAMATINE) tablet 10 mg  10 mg Oral TID WITH MEALS    famotidine (PF) (PEPCID) 20 mg in 0.9% sodium chloride 10 mL injection  20 mg IntraVENous Q24H    fludrocortisone (FLORINEF) tablet 0.1 mg  0.1 mg Oral DAILY    cefTRIAXone (ROCEPHIN) 2 g in sterile water (preservative free) 20 mL IV syringe  2 g IntraVENous Q24H    Gentamicin: Pharmacy to dose   Other Rx Dosing/Monitoring    NOREPINephrine (LEVOPHED) 8 mg in 5% dextrose 250mL (32 mcg/mL) infusion  0.5-30 mcg/min IntraVENous TITRATE    VANCOMYCIN INFORMATION NOTE   Other Rx Dosing/Monitoring    diphenhydrAMINE-zinc acetate 2%-0.1% (BENADRYL) cream   Topical TID PRN    insulin lispro (HUMALOG) injection   SubCUTAneous Q6H    glucose chewable tablet 16 g  4 Tablet Oral PRN    glucagon (GLUCAGEN) injection 1 mg  1 mg IntraMUSCular PRN    dextrose (D50W) injection syrg 12.5-25 g  25-50 mL IntraVENous PRN    heparin (porcine) injection 5,000 Units  5,000 Units SubCUTAneous Q8H     Current Outpatient Medications   Medication Sig    vitamin B complex (B COMPLEX VITAMINS PO) Take 1 Tablet by mouth daily.  heparin sod,porcine/0.9 % NaCl (heparin flush, porcine, in ns) 100 unit/mL kit Heparin Sodium (Porcine) 1,000 Units/mL Systemic    doxercalciferol (HECTOROL IV) 4 mcg.  HYDROmorphone (DILAUDID) 2 mg tablet as needed.  meclizine (ANTIVERT) 25 mg tablet Take 25 mg by mouth as needed.  methoxy peg-epoetin beta (MIRCERA INJECTION) 30 mcg.  Narcan 4 mg/actuation nasal spray     vitamin D3-folic acid 7,505 unit- 1 mg tab Take 1 Tablet by mouth.  midodrine (PROAMATINE) 2.5 mg tablet Take 1 Tablet by mouth three (3) times daily (with meals).  DULoxetine (Cymbalta) 20 mg capsule Take 20 mg by mouth daily.  fludrocortisone (FLORINEF) 0.1 mg tablet Take 1 Tab by mouth daily. (Patient not taking: Reported on 10/1/2021)    b complex vitamins (Vitamins B Complex) tablet Take 1 Tab by mouth daily.  estradioL (Estrace) 0.01 % (0.1 mg/gram) vaginal cream Apply a fingertip amount around the urethra three times a week.  biotin 1,000 mcg chew Take 1 Tab by mouth daily.  cyanocobalamin 1,000 mcg tablet Take 1,000 mcg by mouth daily.  gabapentin (NEURONTIN) 100 mg capsule Take 100 mg by mouth nightly. AS needed    lactobacillus sp. 50 billion cpu (BIO-K PLUS) 50 billion cell -375 mg cap capsule Take 1 Cap by mouth daily.  tamsulosin (FLOMAX) 0.4 mg capsule Take 1 Cap by mouth daily.  sodium bicarbonate 650 mg tablet Take 2 Tabs by mouth three (3) times daily. Indications: excess body acid (Patient not taking: Reported on 10/1/2021)    acetaminophen (TYLENOL) 325 mg tablet Take 650 mg by mouth two (2) times a day.  allopurinoL (ZYLOPRIM) 100 mg tablet Take 200 mg by mouth daily.  ascorbic acid, vitamin C, (VITAMIN C) 500 mg tablet Take 500 mg by mouth daily.  calcitRIOL (ROCALTROL) 0.25 mcg capsule Take 0.25 mcg by mouth daily.  cholecalciferol (VITAMIN D3) (2,000 UNITS /50 MCG) cap capsule Take 2,000 Units by mouth two (2) times a day. Take two tabs a total of 4000 units    latanoprost (XALATAN) 0.005 % ophthalmic solution Administer 1 Drop to both eyes nightly. One drop at bedtime    levothyroxine (SYNTHROID) 125 mcg tablet Take 125 mcg by mouth Daily (before breakfast).     omeprazole (PRILOSEC) 20 mg capsule Take 20 mg by mouth daily.  ondansetron (ZOFRAN ODT) 4 mg disintegrating tablet Take 4 mg by mouth every eight (8) hours as needed for Nausea or Vomiting.  vit B Cmplx 3-FA-Vit C-Biotin (NEPHRO HOWIE RX) 1- mg-mg-mcg tablet Take 1 Tab by mouth daily. Objective:     Visit Vitals  BP (!) 85/55   Pulse 70   Temp 97.9 °F (36.6 °C)   Resp 21   Ht 5' 2\" (1.575 m)   Wt 93.9 kg (207 lb)   SpO2 97%   BMI 37.86 kg/m²     No intake or output data in the 24 hours ending 10/10/21 1409    Physical Examination:     GEN: AAO X 3,  RS: Chest is bilateral equal, no wheezing / rales / crackles  CVS: S1-S2 heard,   Abdomen: Soft, Non tender,   Extremities: No edema,   CNS: Awake & follows commands,   HEENT: Head is atraumatic, PERRLA, conjunctiva pink & non icteric. No JVD or carotid bruit      Data Review:      Labs:     Hematology:   Recent Labs     10/10/21  0937 10/09/21  0455 10/08/21  1550   WBC 8.0 10.9 7.9   HGB 9.2* 10.5* 10.9*   HCT 29.8* 33.9* 34.2*     Chemistry:   Recent Labs     10/10/21  0511 10/09/21  0455 10/08/21  1550   BUN 37* 20* 13   CREA 6.25* 4.51* 3.36*   CA 8.6 8.7 8.7   ALB  --   --  3.1*   K 5.3 4.4 3.3*    137 135*   CL 98* 97* 97*   CO2 31 30 32   PHOS 6.2* 5.4*  --    * 142* 144*        Images:    XR (Most Recent). CXR reviewed by me and compared with previous CXR Results from Hospital Encounter encounter on 10/08/21    XR CHEST PORT    Narrative  Portable Chest    CPT CODE: 17916    HISTORY: Line placements. FINDINGS:    Compared with study done earlier the same day at 1521 hours. Right internal jugular line tip at the proximal SVC. Additional wires overlie  the patient. Surgical clips at the epigastrium. Ectasia of the descending  thoracic aorta elevated diaphragm on the right stable. No pneumothorax, effusion  or new lung consolidation. Impression  Right CVL tip at the proximal SVC. No pneumothorax.        CT (Most Recent) Results from [facility] [place] on [place]08/21    CT HEAD WO CONT    Narrative  CT Head without contrast    HISTORY: Hypotension, dizziness. COMPARISON: None    TECHNIQUE:  Axial imaging from the skull base to the vertex was performed  without intravenous contrast.  Coronal and sagittal reformations. All CT scans  are performed using dose optimization techniques as appropriate to the performed  exam including the following: Automated exposure control, adjustment of mA  and/or kV according to patient size, and use of iterative reconstructive  technique. FINDINGS:    No intracranial hemorrhage. No evidence of midline shift, mass effect, or  obvious mass lesion. There is preservation of the gray and white matter  differentiation, no acute infarct (Please note that CT is less sensitive in the  detection of early infarct). Moderately severe periventricular white matter changes of the cerebrum. The size of the ventricles and sulci are normal.  No extra axial fluid  collections. Heavily calcified intracranial carotid arteries. Visualized paranasal sinuses are clear. No evidence for skull fracture. Impression  No CT finding for acute territorial infarct or acute intracranial hemorrhage. Fairly extensive periventricular white matter change. Nonspecific but often  associated with chronic microvascular ischemic disease. EKG No results found for this or any previous visit. I have personally reviewed the old medical records and patient's labs    Plan / Recommendation:      1.  Esrd,on dialysis mon wed Friday,plan for dialysis tomorrow,minimal uf as tolerated  2.hypotension possible urosepsis,on midodrine and pressors  3.anemia,give epo    D/w icu team    Oumou Singer MD  Nephrology  10/10/2021

## 2021-10-11 LAB
ANION GAP SERPL CALC-SCNC: 8 MMOL/L (ref 3–18)
BACTERIA SPEC CULT: ABNORMAL
BACTERIA SPEC CULT: ABNORMAL
BASOPHILS # BLD: 0.1 K/UL (ref 0–0.1)
BASOPHILS NFR BLD: 1 % (ref 0–2)
BUN SERPL-MCNC: 51 MG/DL (ref 7–18)
BUN/CREAT SERPL: 6 (ref 12–20)
CALCIUM SERPL-MCNC: 8.5 MG/DL (ref 8.5–10.1)
CHLORIDE SERPL-SCNC: 101 MMOL/L (ref 100–111)
CO2 SERPL-SCNC: 29 MMOL/L (ref 21–32)
CREAT SERPL-MCNC: 8.03 MG/DL (ref 0.6–1.3)
DIFFERENTIAL METHOD BLD: ABNORMAL
EOSINOPHIL # BLD: 0.2 K/UL (ref 0–0.4)
EOSINOPHIL NFR BLD: 2 % (ref 0–5)
ERYTHROCYTE [DISTWIDTH] IN BLOOD BY AUTOMATED COUNT: 15.9 % (ref 11.6–14.5)
GENTAMICIN SERPL-MCNC: 3.8 UG/ML (ref 0.5–10)
GLUCOSE BLD STRIP.AUTO-MCNC: 100 MG/DL (ref 70–110)
GLUCOSE BLD STRIP.AUTO-MCNC: 82 MG/DL (ref 70–110)
GLUCOSE BLD STRIP.AUTO-MCNC: 86 MG/DL (ref 70–110)
GLUCOSE SERPL-MCNC: 85 MG/DL (ref 74–99)
GRAM STN SPEC: ABNORMAL
GRAM STN SPEC: ABNORMAL
HCT VFR BLD AUTO: 28.2 % (ref 35–45)
HGB BLD-MCNC: 8.8 G/DL (ref 12–16)
LYMPHOCYTES # BLD: 2.2 K/UL (ref 0.9–3.6)
LYMPHOCYTES NFR BLD: 27 % (ref 21–52)
MAGNESIUM SERPL-MCNC: 2.5 MG/DL (ref 1.6–2.6)
MCH RBC QN AUTO: 29.7 PG (ref 24–34)
MCHC RBC AUTO-ENTMCNC: 31.2 G/DL (ref 31–37)
MCV RBC AUTO: 95.3 FL (ref 78–100)
MONOCYTES # BLD: 0.9 K/UL (ref 0.05–1.2)
MONOCYTES NFR BLD: 11 % (ref 3–10)
NEUTS SEG # BLD: 4.6 K/UL (ref 1.8–8)
NEUTS SEG NFR BLD: 58 % (ref 40–73)
PHOSPHATE SERPL-MCNC: 6.6 MG/DL (ref 2.5–4.9)
PLATELET # BLD AUTO: 245 K/UL (ref 135–420)
PMV BLD AUTO: 9.4 FL (ref 9.2–11.8)
POTASSIUM SERPL-SCNC: 4.7 MMOL/L (ref 3.5–5.5)
PROCALCITONIN SERPL-MCNC: 0.71 NG/ML
RBC # BLD AUTO: 2.96 M/UL (ref 4.2–5.3)
SERVICE CMNT-IMP: ABNORMAL
SODIUM SERPL-SCNC: 138 MMOL/L (ref 136–145)
VANCOMYCIN SERPL-MCNC: 19.5 UG/ML (ref 5–40)
WBC # BLD AUTO: 8 K/UL (ref 4.6–13.2)

## 2021-10-11 PROCEDURE — 74011000250 HC RX REV CODE- 250: Performed by: INTERNAL MEDICINE

## 2021-10-11 PROCEDURE — 80202 ASSAY OF VANCOMYCIN: CPT

## 2021-10-11 PROCEDURE — 74011250636 HC RX REV CODE- 250/636: Performed by: PHYSICIAN ASSISTANT

## 2021-10-11 PROCEDURE — 80048 BASIC METABOLIC PNL TOTAL CA: CPT

## 2021-10-11 PROCEDURE — 65270000029 HC RM PRIVATE

## 2021-10-11 PROCEDURE — 74011000250 HC RX REV CODE- 250: Performed by: PHYSICIAN ASSISTANT

## 2021-10-11 PROCEDURE — 74011250636 HC RX REV CODE- 250/636: Performed by: INTERNAL MEDICINE

## 2021-10-11 PROCEDURE — 84100 ASSAY OF PHOSPHORUS: CPT

## 2021-10-11 PROCEDURE — 80170 ASSAY OF GENTAMICIN: CPT

## 2021-10-11 PROCEDURE — 84145 PROCALCITONIN (PCT): CPT

## 2021-10-11 PROCEDURE — 83735 ASSAY OF MAGNESIUM: CPT

## 2021-10-11 PROCEDURE — 85025 COMPLETE CBC W/AUTO DIFF WBC: CPT

## 2021-10-11 PROCEDURE — 82962 GLUCOSE BLOOD TEST: CPT

## 2021-10-11 PROCEDURE — 74011250637 HC RX REV CODE- 250/637: Performed by: PHYSICIAN ASSISTANT

## 2021-10-11 PROCEDURE — 99291 CRITICAL CARE FIRST HOUR: CPT | Performed by: INTERNAL MEDICINE

## 2021-10-11 PROCEDURE — 90935 HEMODIALYSIS ONE EVALUATION: CPT

## 2021-10-11 RX ORDER — FLUDROCORTISONE ACETATE 0.1 MG/1
0.2 TABLET ORAL DAILY
Status: DISCONTINUED | OUTPATIENT
Start: 2021-10-11 | End: 2021-10-14 | Stop reason: HOSPADM

## 2021-10-11 RX ADMIN — MIDODRINE HYDROCHLORIDE 10 MG: 5 TABLET ORAL at 08:50

## 2021-10-11 RX ADMIN — FLUDROCORTISONE ACETATE 0.2 MG: 0.1 TABLET ORAL at 16:21

## 2021-10-11 RX ADMIN — MIDODRINE HYDROCHLORIDE 10 MG: 5 TABLET ORAL at 16:07

## 2021-10-11 RX ADMIN — HEPARIN SODIUM 5000 UNITS: 5000 INJECTION INTRAVENOUS; SUBCUTANEOUS at 06:23

## 2021-10-11 RX ADMIN — CEFEPIME HYDROCHLORIDE 1 G: 1 INJECTION, POWDER, FOR SOLUTION INTRAMUSCULAR; INTRAVENOUS at 16:07

## 2021-10-11 RX ADMIN — HEPARIN SODIUM 5000 UNITS: 5000 INJECTION INTRAVENOUS; SUBCUTANEOUS at 16:07

## 2021-10-11 RX ADMIN — FAMOTIDINE 20 MG: 10 INJECTION INTRAVENOUS at 08:50

## 2021-10-11 NOTE — PROGRESS NOTES
RENAL DAILY PROGRESS NOTE    Patient: Zee Del Valle               Sex: female          DOA: 10/8/2021  2:50 PM        YOB: 1945      Age:  64 y.o.        LOS:  LOS: 3 days     Subjective:     Zee Del Valle is a 64 y.o.  who presents with Septic shock ([facility] [address] [A41.9, R65.21].    Asked to evaluate for esrd,admitted with hypotension,hx of reccurent uti,s/p ureteral stent exchange few days ago  Chief complains: Patient denies nausea, vomiting, chest pain, dizziness, shortness of breath or headache.  - Reviewed last 24 hrs events     Current Facility-Administered Medications   Medication Dose Route Frequency    [START ON 10/13/2021] Vancomycin Lab Information: Random Level Due  1 Each Other ONCE    [START ON 10/13/2021] Gentamicin Lab Information: Random Level Due  1 Each Other ONCE    fludrocortisone (FLORINEF) tablet 0.2 mg  0.2 mg Oral DAILY    midodrine (PROAMATINE) tablet 10 mg  10 mg Oral TID WITH MEALS    epoetin caitlin-epbx (RETACRIT) injection 8,000 Units  8,000 Units SubCUTAneous Q MON, WED & FRI    cefepime (MAXIPIME) 1 g in sterile water (preservative free) 10 mL IV syringe  1 g IntraVENous Q24H    famotidine (PF) (PEPCID) 20 mg in 0.9% sodium chloride 10 mL injection  20 mg IntraVENous Q24H    Gentamicin: Pharmacy to dose   Other Rx Dosing/Monitoring    NOREPINephrine (LEVOPHED) 8 mg in 5% dextrose 250mL (32 mcg/mL) infusion  0.5-30 mcg/min IntraVENous TITRATE    VANCOMYCIN INFORMATION NOTE   Other Rx Dosing/Monitoring    diphenhydrAMINE-zinc acetate 2%-0.1% (BENADRYL) cream   Topical TID PRN    insulin lispro (HUMALOG) injection   SubCUTAneous Q6H    glucose chewable tablet 16 g  4 Tablet Oral PRN    glucagon (GLUCAGEN) injection 1 mg  1 mg IntraMUSCular PRN    dextrose (D50W) injection syrg 12.5-25 g  25-50 mL IntraVENous PRN    heparin (porcine) injection 5,000 Units  5,000 Units SubCUTAneous Q8H     Current Outpatient Medications   Medication Sig    vitamin B complex (B COMPLEX VITAMINS PO) Take 1 Tablet by mouth daily.  heparin sod,porcine/0.9 % NaCl (heparin flush, porcine, in ns) 100 unit/mL kit Heparin Sodium (Porcine) 1,000 Units/mL Systemic    doxercalciferol (HECTOROL IV) 4 mcg.  HYDROmorphone (DILAUDID) 2 mg tablet as needed.  meclizine (ANTIVERT) 25 mg tablet Take 25 mg by mouth as needed.  methoxy peg-epoetin beta (MIRCERA INJECTION) 30 mcg.  Narcan 4 mg/actuation nasal spray     vitamin D3-folic acid 9,216 unit- 1 mg tab Take 1 Tablet by mouth.  midodrine (PROAMATINE) 2.5 mg tablet Take 1 Tablet by mouth three (3) times daily (with meals).  DULoxetine (Cymbalta) 20 mg capsule Take 20 mg by mouth daily.  fludrocortisone (FLORINEF) 0.1 mg tablet Take 1 Tab by mouth daily. (Patient not taking: Reported on 10/1/2021)    b complex vitamins (Vitamins B Complex) tablet Take 1 Tab by mouth daily.  estradioL (Estrace) 0.01 % (0.1 mg/gram) vaginal cream Apply a fingertip amount around the urethra three times a week.  biotin 1,000 mcg chew Take 1 Tab by mouth daily.  cyanocobalamin 1,000 mcg tablet Take 1,000 mcg by mouth daily.  gabapentin (NEURONTIN) 100 mg capsule Take 100 mg by mouth nightly. AS needed    lactobacillus sp. 50 billion cpu (BIO-K PLUS) 50 billion cell -375 mg cap capsule Take 1 Cap by mouth daily.  tamsulosin (FLOMAX) 0.4 mg capsule Take 1 Cap by mouth daily.  sodium bicarbonate 650 mg tablet Take 2 Tabs by mouth three (3) times daily. Indications: excess body acid (Patient not taking: Reported on 10/1/2021)    acetaminophen (TYLENOL) 325 mg tablet Take 650 mg by mouth two (2) times a day.  allopurinoL (ZYLOPRIM) 100 mg tablet Take 200 mg by mouth daily.  ascorbic acid, vitamin C, (VITAMIN C) 500 mg tablet Take 500 mg by mouth daily.  calcitRIOL (ROCALTROL) 0.25 mcg capsule Take 0.25 mcg by mouth daily.     cholecalciferol (VITAMIN D3) (2,000 UNITS /50 MCG) cap capsule Take 2,000 Units by mouth two (2) times a day. Take two tabs a total of 4000 units    latanoprost (XALATAN) 0.005 % ophthalmic solution Administer 1 Drop to both eyes nightly. One drop at bedtime    levothyroxine (SYNTHROID) 125 mcg tablet Take 125 mcg by mouth Daily (before breakfast).  omeprazole (PRILOSEC) 20 mg capsule Take 20 mg by mouth daily.  ondansetron (ZOFRAN ODT) 4 mg disintegrating tablet Take 4 mg by mouth every eight (8) hours as needed for Nausea or Vomiting.  vit B Cmplx 3-FA-Vit C-Biotin (NEPHRO HOWIE RX) 1- mg-mg-mcg tablet Take 1 Tab by mouth daily. Objective:     Visit Vitals  BP (!) 105/38   Pulse 75   Temp 97.9 °F (36.6 °C)   Resp 13   Ht 5' 2\" (1.575 m)   Wt 93.9 kg (207 lb)   SpO2 99%   BMI 37.86 kg/m²     No intake or output data in the 24 hours ending 10/11/21 8132    Physical Examination:     GEN: AAO X 3,  RS: Chest is bilateral equal, no wheezing / rales / crackles  CVS: S1-S2 heard,   Abdomen: Soft, Non tender,   Extremities: No edema,   CNS: Awake & follows commands,   HEENT: Head is atraumatic, PERRLA, conjunctiva pink & non icteric. No JVD or carotid bruit      Data Review:      Labs:     Hematology:   Recent Labs     10/11/21  0535 10/10/21  0937 10/09/21  0455 10/08/21  1550   WBC 8.0 8.0 10.9 7.9   HGB 8.8* 9.2* 10.5* 10.9*   HCT 28.2* 29.8* 33.9* 34.2*     Chemistry:   Recent Labs     10/11/21  0535 10/10/21  0511 10/09/21  0455 10/08/21  1550   BUN 51* 37* 20* 13   CREA 8.03* 6.25* 4.51* 3.36*   CA 8.5 8.6 8.7 8.7   ALB  --   --   --  3.1*   K 4.7 5.3 4.4 3.3*    138 137 135*    98* 97* 97*   CO2 29 31 30 32   PHOS 6.6* 6.2* 5.4*  --    GLU 85 101* 142* 144*        Images:    XR (Most Recent). CXR reviewed by me and compared with previous CXR Results from Hospital Encounter encounter on 10/08/21    XR CHEST PORT    Narrative  Portable Chest    CPT CODE: 27358    HISTORY: Line placements. FINDINGS:    Compared with study done earlier the same day at 1521 hours.     Right internal jugular line tip at the proximal SVC. Additional wires overlie  the patient. Surgical clips at the epigastrium. Ectasia of the descending  thoracic aorta elevated diaphragm on the right stable. No pneumothorax, effusion  or new lung consolidation. Impression  Right CVL tip at the proximal SVC. No pneumothorax. CT (Most Recent) Results from Hospital Encounter encounter on 10/08/21    CT HEAD WO CONT    Narrative  CT Head without contrast    HISTORY: Hypotension, dizziness. COMPARISON: None    TECHNIQUE:  Axial imaging from the skull base to the vertex was performed  without intravenous contrast.  Coronal and sagittal reformations. All CT scans  are performed using dose optimization techniques as appropriate to the performed  exam including the following: Automated exposure control, adjustment of mA  and/or kV according to patient size, and use of iterative reconstructive  technique. FINDINGS:    No intracranial hemorrhage. No evidence of midline shift, mass effect, or  obvious mass lesion. There is preservation of the gray and white matter  differentiation, no acute infarct (Please note that CT is less sensitive in the  detection of early infarct). Moderately severe periventricular white matter changes of the cerebrum. The size of the ventricles and sulci are normal.  No extra axial fluid  collections. Heavily calcified intracranial carotid arteries. Visualized paranasal sinuses are clear. No evidence for skull fracture. Impression  No CT finding for acute territorial infarct or acute intracranial hemorrhage. Fairly extensive periventricular white matter change. Nonspecific but often  associated with chronic microvascular ischemic disease. EKG No results found for this or any previous visit. I have personally reviewed the old medical records and patient's labs    Plan / Recommendation:      1.  Esrd,on dialysis mon wed Friday,plan for dialysis today,minimal uf as tolerated  2.hypotension possible urosepsis,on midodrine ,bp dropped to 70 this am,levophed resumed. check echo and cortisol level  3.anemia,give epo    D/w icu team    Tyrone Emmanuel MD  Nephrology  10/11/2021

## 2021-10-11 NOTE — CONSULTS
Infectious Disease Consultation Note        Reason: septic shock, cystitis    Current abx Prior abx   Cefepime since 10/10  Vancomycin, gentamicin since 10/9 Ceftriaxone 10/5  Meropenem 10/8-10/9     Lines:       Assessment :    66 y. o. 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      E.coli bloodstream infection in 3/2019 (pan susceptible e.coli)     Acinetobacter UTI in 4/2019 -  (intermediately susceptible to ceftriaxone, ceftazidime, pip/tazo)     Hospitalization 2/2020 for  early hemorrhagic cystitis - Right PCNL. No hydronephrosis noted on Ct scan 2/20/20. +nt edema of bladder c/w cystitis. Urine culture 2/20/20-greater than 100,000 colonies of mixed gram-positive mike including 20,000 staph aureus- MSSA.      S/P PCNL exchange on 2/21/20. Urology help appreciated.      urine culture 4/13/20 positive for >100,000 colonies of strep. Bovis   urine cx 2/19/21- >100,000 colonies of pseudomonas    S/p ciprofloxacin 9/28-10/4  Ureteral stricture S/P stent change 10/5  Urine cx 10/5/21- no growth    Clinical presentation c/w septic shock due to partially treated uti, complicated UTI  Single positive blood cx for gpc- ? Contamination versus bloodstream infection     Improved suprapubic discomfort. Delays in sending urine cultures. Urine culture collected 10/9/2021 could be false negative due to prior antibiotics  Urology follow-up appreciated. Clinically better. Off pressors. Patient has documented h/o ciprofloxacin allergy. She has tolerated ciprofloxacin on multiple occasions in the past.      Recommendations   - recommend vancomycin, gentamicin. D/c  Cefepime.    -f/u urine, blood cx results  -Follow-up repeat blood cx 10/10/2021  - f/u urology recommendations -will modify abx based on above results, cl inical course     Thank you for consultation request. Above plan was discussed in details with patient. Please call me if any further questions or concerns. Will continue to participate in the care of this patient. HPI:    65 YO F with PMHx of ESRD on HD, chronic hypotension on midodrine, ureteral stricture s/p stent by urology, recurrent UTIs presented to the ED from dialysis center after having low BPs after an HD treatment with associated lightheadedness. She was found to be hypotensive in the 70s and was started on levophed gtt. Lactic acid was very mildly elevated. S    She is known to me from prior inpatient consultation at [facility]. She has history of recurrent UTIs. She has history of urethral stricture status post stent placement. The stent was last exchanged on 10/5/2021. Patient states that she received 1 week of ciprofloxacin prior to the procedure. Her urine culture on 10/5/2021 was negative. After the procedure patient started noticing bladder discomfort. She denies any fever or chills at home. She went for hemodialysis on 10/8/2021.-She was found to have low blood pressure and sent to emergency room for further evaluation. Here she required to be started on Levophed. She is admitted to intensive care unit.  she was started on broad spectrum ABX. UA was ordered but never collected on admission. I was subsequently consulted for antibiotic recommendations. I recommended to use cefepime, vancomycin, gentamicin. Urology was consulted. No evidence of hydronephrosis and ultrasound of the kidneys. Patient states that she feels better today compared to day of admission. She has noted improvement in her urinary urgency, suprapubic discomfort. She was off pressors when I evaluated her. Past Medical History:   Diagnosis Date    Acidosis     Anemia     Arteriovenous fistula (HCC)     Chronic kidney disease     on HD at [facility] on [address] on MWF. [phone]    Chronic pain     CKD (chronic kidney disease)     Diabetes ([facility]     no meds now    ESRD (end stage renal disease) on dialysis ([facility] 9/9/2021    HLD (hyperlipidemia)     HTN (hypertension)     Hyperparathyroidism due to renal insufficiency ([facility]     Hypothyroid     Kidney stone     Lung mass     Recurrent UTI     Ureter, stricture     Uric acid nephrolithiasis     Urinary incontinence        Past Surgical History:   Procedure Laterality Date    HX APPENDECTOMY      HX CHOLECYSTECTOMY      HX GASTRIC BYPASS      Gastric stapling    HX KNEE ARTHROSCOPY      HX UROLOGICAL      right PCN placement    HX UROLOGICAL  07/23/2018    RIGHT URETEROSCOPY WITH HOLMIUM LASER    IR EXCHANGE NEPHRO PERC LT SI  2/21/2020    IR EXCHANGE NEPHRO PERC RT SI  4/13/2020    IR EXCHANGE NEPHRO PERC RT SI  7/17/2020    IR NEPHROSTOMY PERC RT PLC CATH  SI  10/14/2020    IR NEPHROURETERAL PERC RT PLC CATH NEW ACCESS  SI  4/30/2020    WI INTRO CATH DIALYSIS CIRCUIT DX ANGRPH FLUOR S&I Left 9/24/2020    FISTULOGRAM LEFT/poss permanent catheter placement performed by Ricardo Wilson [facility] at [facility] CATH LAB    VASCULAR SURGERY PROCEDURE UNLIST      lef AVF       Patient's Medications   Start Taking    No medications on file   Continue Taking    ACETAMINOPHEN (TYLENOL) 325 MG TABLET    Take 650 mg by mouth two (2) times a day. ALLOPURINOL (ZYLOPRIM) 100 MG TABLET    Take 200 mg by mouth daily. ASCORBIC ACID, VITAMIN C, (VITAMIN C) 500 MG TABLET    Take 500 mg by mouth daily. B COMPLEX VITAMINS (VITAMINS B COMPLEX) TABLET    Take 1 Tab by mouth daily. BIOTIN 1,000 MCG CHEW    Take 1 Tab by mouth daily. CALCITRIOL (ROCALTROL) 0.25 MCG CAPSULE    Take 0.25 mcg by mouth daily. CHOLECALCIFEROL (VITAMIN D3) (2,000 UNITS /50 MCG) CAP CAPSULE    Take 2,000 Units by mouth two (2) times a day.  Take two tabs a total of 4000 units    CYANOCOBALAMIN 1,000 MCG TABLET    Take 1,000 mcg by mouth daily.    DOXERCALCIFEROL (HECTOROL IV)    4 mcg. DULOXETINE (CYMBALTA) 20 MG CAPSULE    Take 20 mg by mouth daily. ESTRADIOL (ESTRACE) 0.01 % (0.1 MG/GRAM) VAGINAL CREAM    Apply a fingertip amount around the urethra three times a week. FLUDROCORTISONE (FLORINEF) 0.1 MG TABLET    Take 1 Tab by mouth daily. GABAPENTIN (NEURONTIN) 100 MG CAPSULE    Take 100 mg by mouth nightly. AS needed    HEPARIN SOD,PORCINE/0.9 % NACL (HEPARIN FLUSH, PORCINE, IN NS) 100 UNIT/ML KIT    Heparin Sodium (Porcine) 1,000 Units/mL Systemic    HYDROMORPHONE (DILAUDID) 2 MG TABLET    as needed. LACTOBACILLUS SP. 50 BILLION CPU (BIO-K PLUS) 50 BILLION CELL -375 MG CAP CAPSULE    Take 1 Cap by mouth daily. LATANOPROST (XALATAN) 0.005 % OPHTHALMIC SOLUTION    Administer 1 Drop to both eyes nightly. One drop at bedtime    LEVOTHYROXINE (SYNTHROID) 125 MCG TABLET    Take 125 mcg by mouth Daily (before breakfast). MECLIZINE (ANTIVERT) 25 MG TABLET    Take 25 mg by mouth as needed. METHOXY PEG-EPOETIN BETA (MIRCERA INJECTION)    30 mcg. MIDODRINE (PROAMATINE) 2.5 MG TABLET    Take 1 Tablet by mouth three (3) times daily (with meals). NARCAN 4 MG/ACTUATION NASAL SPRAY        OMEPRAZOLE (PRILOSEC) 20 MG CAPSULE    Take 20 mg by mouth daily. ONDANSETRON (ZOFRAN ODT) 4 MG DISINTEGRATING TABLET    Take 4 mg by mouth every eight (8) hours as needed for Nausea or Vomiting. SODIUM BICARBONATE 650 MG TABLET    Take 2 Tabs by mouth three (3) times daily. Indications: excess body acid    TAMSULOSIN (FLOMAX) 0.4 MG CAPSULE    Take 1 Cap by mouth daily. VIT B CMPLX 3-FA-VIT C-BIOTIN (NEPHRO HOWIE RX) 1- MG-MG-MCG TABLET    Take 1 Tab by mouth daily. VITAMIN B COMPLEX (B COMPLEX VITAMINS PO)    Take 1 Tablet by mouth daily. VITAMIN D3-FOLIC ACID 9,853 UNIT- 1 MG TAB    Take 1 Tablet by mouth.    These Medications have changed    No medications on file   Stop Taking    No medications on file       Current Facility-Administered Medications   Medication Dose Route Frequency    [START ON 10/13/2021] Vancomycin Lab Information: Random Level Due  1 Each Other ONCE    [START ON 10/13/2021] Gentamicin Lab Information: Random Level Due  1 Each Other ONCE    fludrocortisone (FLORINEF) tablet 0.2 mg  0.2 mg Oral DAILY    vancomycin (VANCOCIN) 750 mg in 0.9% sodium chloride 250 mL (VIAL-MATE)  750 mg IntraVENous DIALYSIS ONE TIME DOSE    gentamicin (GARAMYCIN) 70 mg in 0.9% sodium chloride 100 mL IVPB  70 mg IntraVENous DIALYSIS ONE TIME DOSE    midodrine (PROAMATINE) tablet 10 mg  10 mg Oral TID WITH MEALS    epoetin caitlin-epbx (RETACRIT) injection 8,000 Units  8,000 Units SubCUTAneous Q MON, WED & FRI    cefepime (MAXIPIME) 1 g in sterile water (preservative free) 10 mL IV syringe  1 g IntraVENous Q24H    famotidine (PF) (PEPCID) 20 mg in 0.9% sodium chloride 10 mL injection  20 mg IntraVENous Q24H    Gentamicin: Pharmacy to dose   Other Rx Dosing/Monitoring    NOREPINephrine (LEVOPHED) 8 mg in 5% dextrose 250mL (32 mcg/mL) infusion  0.5-30 mcg/min IntraVENous TITRATE    VANCOMYCIN INFORMATION NOTE   Other Rx Dosing/Monitoring    diphenhydrAMINE-zinc acetate 2%-0.1% (BENADRYL) cream   Topical TID PRN    insulin lispro (HUMALOG) injection   SubCUTAneous Q6H    glucose chewable tablet 16 g  4 Tablet Oral PRN    glucagon (GLUCAGEN) injection 1 mg  1 mg IntraMUSCular PRN    dextrose (D50W) injection syrg 12.5-25 g  25-50 mL IntraVENous PRN    heparin (porcine) injection 5,000 Units  5,000 Units SubCUTAneous Q8H     Current Outpatient Medications   Medication Sig    vitamin B complex (B COMPLEX VITAMINS PO) Take 1 Tablet by mouth daily.  heparin sod,porcine/0.9 % NaCl (heparin flush, porcine, in ns) 100 unit/mL kit Heparin Sodium (Porcine) 1,000 Units/mL Systemic    doxercalciferol (HECTOROL IV) 4 mcg.  HYDROmorphone (DILAUDID) 2 mg tablet as needed.     meclizine (ANTIVERT) 25 mg tablet Take 25 mg by mouth as needed.  methoxy peg-epoetin beta (MIRCERA INJECTION) 30 mcg.  Narcan 4 mg/actuation nasal spray     vitamin D3-folic acid 6,652 unit- 1 mg tab Take 1 Tablet by mouth.  midodrine (PROAMATINE) 2.5 mg tablet Take 1 Tablet by mouth three (3) times daily (with meals).  DULoxetine (Cymbalta) 20 mg capsule Take 20 mg by mouth daily.  fludrocortisone (FLORINEF) 0.1 mg tablet Take 1 Tab by mouth daily. (Patient not taking: Reported on 10/1/2021)    b complex vitamins (Vitamins B Complex) tablet Take 1 Tab by mouth daily.  estradioL (Estrace) 0.01 % (0.1 mg/gram) vaginal cream Apply a fingertip amount around the urethra three times a week.  biotin 1,000 mcg chew Take 1 Tab by mouth daily.  cyanocobalamin 1,000 mcg tablet Take 1,000 mcg by mouth daily.  gabapentin (NEURONTIN) 100 mg capsule Take 100 mg by mouth nightly. AS needed    lactobacillus sp. 50 billion cpu (BIO-K PLUS) 50 billion cell -375 mg cap capsule Take 1 Cap by mouth daily.  tamsulosin (FLOMAX) 0.4 mg capsule Take 1 Cap by mouth daily.  sodium bicarbonate 650 mg tablet Take 2 Tabs by mouth three (3) times daily. Indications: excess body acid (Patient not taking: Reported on 10/1/2021)    acetaminophen (TYLENOL) 325 mg tablet Take 650 mg by mouth two (2) times a day.  allopurinoL (ZYLOPRIM) 100 mg tablet Take 200 mg by mouth daily.  ascorbic acid, vitamin C, (VITAMIN C) 500 mg tablet Take 500 mg by mouth daily.  calcitRIOL (ROCALTROL) 0.25 mcg capsule Take 0.25 mcg by mouth daily.  cholecalciferol (VITAMIN D3) (2,000 UNITS /50 MCG) cap capsule Take 2,000 Units by mouth two (2) times a day. Take two tabs a total of 4000 units    latanoprost (XALATAN) 0.005 % ophthalmic solution Administer 1 Drop to both eyes nightly. One drop at bedtime    levothyroxine (SYNTHROID) 125 mcg tablet Take 125 mcg by mouth Daily (before breakfast).  omeprazole (PRILOSEC) 20 mg capsule Take 20 mg by mouth daily.     ondansetron (ZOFRAN ODT) 4 mg disintegrating tablet Take 4 mg by mouth every eight (8) hours as needed for Nausea or Vomiting.  vit B Cmplx 3-FA-Vit C-Biotin (NEPHRO HOWIE RX) 1- mg-mg-mcg tablet Take 1 Tab by mouth daily. Allergies: Ciprofloxacin, Cyclopentolate, Iron sucrose, Midodrine, and Statins-hmg-coa reductase inhibitors    Family History   Problem Relation Age of Onset    Heart Surgery Sister      Social History     Socioeconomic History    Marital status: SINGLE     Spouse name: Not on file    Number of children: Not on file    Years of education: Not on file    Highest education level: Not on file   Occupational History    Not on file   Tobacco Use    Smoking status: Never Smoker    Smokeless tobacco: Never Used   Vaping Use    Vaping Use: Never used   Substance and Sexual Activity    Alcohol use: Never    Drug use: Never    Sexual activity: Not on file   Other Topics Concern    Not on file   Social History Narrative    Not on file     Social Determinants of Health     Financial Resource Strain:     Difficulty of Paying Living Expenses:    Food Insecurity:     Worried About [address] in the Last Year:     [address] [employer] in the Last Year:    Transportation Needs:     Lack of Transportation (Medical):      Lack of Transportation (Non-Medical):    Physical Activity:     Days of Exercise per Week:     Minutes of Exercise per Session:    Stress:     Feeling of Stress :    Social Connections:     Frequency of Communication with Friends and Family:     Frequency of Social Gatherings with Friends and Family:     Attends Shinto Services:     Active Member of Clubs or Organizations:     Attends Club or Organization Meetings:     Marital Status:    Intimate Partner Violence:     Fear of Current or Ex-Partner:     Emotionally Abused:     Physically Abused:     Sexually Abused:      Social History     Tobacco Use   Smoking Status Never Smoker   Smokeless Tobacco Never Used        No data recorded. Visit Vitals  BP (!) 105/38   Pulse 75   Temp 97.9 °F (36.6 °C)   Resp 13   Ht 5' 2\" (1.575 m)   Wt 93.9 kg (207 lb)   SpO2 99%   BMI 37.86 kg/m²       ROS: 12 point ROS obtained in details. Pertinent positives as mentioned in HPI,   otherwise negative    Physical Exam:    General:  Alert, cooperative, NAD   Head:  Normocephalic, without obvious abnormality, atraumatic. Eyes:  Conjunctivae/corneas clear. Nose: Nares normal. Septum midline. Mucosa normal. No drainage or sinus tenderness. Throat: Lips, mucosa, and tongue normal. Teeth and gums normal.   Neck: Supple, symmetrical, trachea midline, no adenopathy, no carotid bruit and no JVD. Lungs:   Symmetrical chest rise; good AE bilat; CTAB; no wheezes/rhonchi/rales noted. Heart:  RRR, S1, S2 normal   Abdomen:   Soft, non-tender. Bowel sounds normal. No masses,  No organomegaly. Extremities: Extremities normal, atraumatic, no cyanosis or edema. Pulses: 2+ and symmetric all extremities. Skin: Skin color, texture, turgor normal. Hives to R forearm   Neurologic: Grossly nonfocal   Devices: PIVs,             Labs: Results:   Chemistry Recent Labs     10/11/21  0535 10/10/21  0511 10/09/21  0455 10/08/21  1550 10/08/21  1550   GLU 85 101* 142*   < > 144*    138 137   < > 135*   K 4.7 5.3 4.4   < > 3.3*    98* 97*   < > 97*   CO2 29 31 30   < > 32   BUN 51* 37* 20*   < > 13   CREA 8.03* 6.25* 4.51*   < > 3.36*   CA 8.5 8.6 8.7   < > 8.7   AGAP 8 9 10   < > 6   BUCR 6* 6* 4*   < > 4*   AP  --   --   --   --  139*   TP  --   --   --   --  7.9   ALB  --   --   --   --  3.1*   GLOB  --   --   --   --  4.8*   AGRAT  --   --   --   --  0.6*    < > = values in this interval not displayed.       CBC w/Diff Recent Labs     10/11/21  0535 10/10/21  0937 10/09/21  0455   WBC 8.0 8.0 10.9   RBC 2.96* 3.11* 3.51*   HGB 8.8* 9.2* 10.5*   HCT 28.2* 29.8* 33.9*    254 281   GRANS 58 69 60   LYMPH 27 18* 26 EOS 2 2 1      Microbiology Recent Labs     10/08/21  1550 10/08/21  1535   CULT NO GROWTH 2 DAYS STAPHYLOCOCCUS SPECIES, COAGULASE NEGATIVE GROWING IN 1 OF 2 BOTTLES DRAWN (SITE = L AC)*  PRELIMINARY REPORT OF  [facility]  CALLED TO AND READ BACK BY  PA E TRACT ER ON [id number] AT 2202 TO 1396. RADIOLOGY:    All available imaging studies/reports in [facility] for this admission were reviewed      Disclaimer: Sections of this note are dictated utilizing voice recognition software, which may have resulted in some phonetic based errors in grammar and contents. Even though attempts were made to correct all the mistakes, some may have been missed, and remained in the body of the document. If questions arise, please contact our department.     Dr. Rajani Mckeon, Infectious Disease Specialist  [phone]  October 11, 2021  10:14 AM

## 2021-10-11 NOTE — PROGRESS NOTES
Urology Progress Note        Assessment/Plan:     Assessment:   Ureteral stricture s/p stent exchange 10/5 with Dr. Anselmo Khalil  UTI, urosepsis   Ucx 10/8 pending, 10/5 NG   Normal WBC 8.0   Afebrile, intermittent hypotension     ESRD- MWF  DM2    Presented to the ED 10/8 for hypotension and dizziness following HD    Plan:   Patient remains in the ED awaiting bed placement  Intermittent hypotension, now off Levophed   US reviewed, shows no hydronephrosis  Ucx still pending, currently on maxipime, gent and vanc   Single Blood cx positive for GPC, possibly contaminated vs bloodstream infection, ID following   HD today  Following peripherally       Follow up arranged? Pending clinical course     Valentino Dukes, NP-BC  [facility]   Pager [phone] by Pablito Aguilar. Asa Malcolm MD    I agree with the findings as documented, and participated in the development of the care plan, any changes were made to the note as needed and any additional comments are below:        Pablito Aguilar. Asa Malcolm MD  [facility] (pager)               Subjective:     Daily Progress Note: 10/11/2021 10:37 AM    Sharlene Bailon is doing well, denies any pain or new symptoms, feels better  HD today, no urinary symptoms       Objective:     Visit Vitals  BP (!) 105/38   Pulse 75   Temp 97.9 °F (36.6 °C)   Resp 13   Ht 5' 2\" (1.575 m)   Wt 93.9 kg (207 lb)   SpO2 99%   BMI 37.86 kg/m²        No data recorded. Intake and Output:  No intake/output data recorded. No intake/output data recorded. PHYSICAL EXAMINATION:   Visit Vitals  BP (!) 105/38   Pulse 75   Temp 97.9 °F (36.6 °C)   Resp 13   Ht 5' 2\" (1.575 m)   Wt 93.9 kg (207 lb)   SpO2 99%   BMI 37.86 kg/m²     Constitutional: Well developed, well-nourished female in no acute distress. HEENT: Normocephalic, Atraumatic   CV:   no edema  Respiratory: No respiratory distress or difficulties breathing   Abdomen:  Soft and nontender.     Female:  CVA tenderness: none  Skin:  Normal color. No evidence of jaundice. Neuro/Psych:  Patient with appropriate affect. Alert and oriented. Lab/Data Review: All lab results for the last 24 hours reviewed. US 10/9  FINDINGS:     Right Kidney: The right kidney measures 5.4 x 3.3 x 2.5 cm in size with  significant cortical thinning. The echotexture is increased. No  hydronephrosis. No renal calculi evident. No contour deforming mass. Left Kidney: The left kidney measures 5.7 x 2.8 x 2.4 cm in size. The  echotexture is  increased. No hydronephrosis. .  No renal calculi evident. 1.2 cm cystic-appearing lesion medially. 1.6 cm cyst in the upper pole. 1.5 cm  cyst in the interpolar kidney. Bladder: The bladder is not well distended measuring 3.2 x 4.7 x 7.2 cm (volume  75.2 mL). IMPRESSION     Atrophic kidneys compatible with chronic kidney disease. No hydronephrosis. Stent not clearly visualized. Labs:     Labs: Results:   Chemistry    Recent Labs     10/11/21  0535 10/10/21  0511 10/09/21  0455 10/08/21  1550 10/08/21  1550   GLU 85 101* 142*   < > 144*    138 137   < > 135*   K 4.7 5.3 4.4   < > 3.3*    98* 97*   < > 97*   CO2 29 31 30   < > 32   BUN 51* 37* 20*   < > 13   CREA 8.03* 6.25* 4.51*   < > 3.36*   CA 8.5 8.6 8.7   < > 8.7   AGAP 8 9 10   < > 6   BUCR 6* 6* 4*   < > 4*   AP  --   --   --   --  139*   TP  --   --   --   --  7.9   ALB  --   --   --   --  3.1*   GLOB  --   --   --   --  4.8*   AGRAT  --   --   --   --  0.6*    < > = values in this interval not displayed.       CBC w/Diff Recent Labs     10/11/21  0535 10/10/21  0937 10/09/21  0455   WBC 8.0 8.0 10.9   RBC 2.96* 3.11* 3.51*   HGB 8.8* 9.2* 10.5*   HCT 28.2* 29.8* 33.9*    254 281   GRANS 58 69 60   LYMPH 27 18* 26   EOS 2 2 1      Cultures Recent Labs     10/08/21  1550 10/08/21  1535   CULT NO GROWTH 2 DAYS STAPHYLOCOCCUS SPECIES, COAGULASE NEGATIVE GROWING IN 1 OF 2 BOTTLES DRAWN (SITE = [facility])*  PRELIMINARY REPORT OF  GPC IN CLUSTERS  CALLED TO AND READ BACK BY  PA GIN TRACT ER ON [id number] AT 2202 TO 1396. All Micro Results       Procedure Component Value Units Date/Time    CULTURE, BLOOD [[id number]]  (Abnormal) Collected: 10/08/21 1535    Order Status: Completed Specimen: Blood Updated: 10/11/21 0638     Special Requests: NO SPECIAL REQUESTS        GRAM STAIN       AEROBIC BOTTLE GRAM POSITIVE COCCI IN GROUPS                  SMEAR CALLED TO AND CORRECTLY REPEATED BY: NOVA ALVES ER ON [id number] AT 2202 HRS TO 1396. Culture result:       STAPHYLOCOCCUS SPECIES, COAGULASE NEGATIVE GROWING IN 1 OF 2 BOTTLES DRAWN (SITE = [facility])                  PRELIMINARY REPORT OF  GPC IN CLUSTERS  CALLED TO AND READ BACK BY  PA E TRACT ER ON [id number] AT 2202 TO 1396.        CULTURE, BLOOD [[id number]] Collected: 10/10/21 1631    Order Status: Completed Specimen: Blood Updated: 10/10/21 1642    CULTURE, URINE [[id number]] Collected: 10/08/21 1915    Order Status: Completed Specimen: Urine from Clean catch Updated: 10/10/21 1234    CULTURE, BLOOD [[id number]] Collected: 10/08/21 1550    Order Status: Completed Specimen: Blood Updated: 10/10/21 0727     Special Requests: NO SPECIAL REQUESTS        Culture result: NO GROWTH 2 DAYS       CULTURE, URINE [[id number]] Collected: 10/09/21 1415    Order Status: Canceled Specimen: Cath Urine     RESPIRATORY VIRUS PANEL W/COVID-19, PCR [[id number]] Collected: 10/09/21 0707    Order Status: Completed Specimen: Nasopharyngeal Updated: 10/09/21 0825     Adenovirus Not detected        Coronavirus 229E Not detected        Coronavirus HKU1 Not detected        Coronavirus CVNL63 Not detected        Coronavirus OC43 Not detected        SARS-CoV-2, PCR Not detected        Metapneumovirus Not detected        Rhinovirus and Enterovirus Not detected        Influenza A Not detected        Influenza B Not detected        Parainfluenza 1 Not detected        Parainfluenza 2 Not detected        Parainfluenza 3 Not detected        Parainfluenza virus 4 Not detected        RSV by PCR Not detected        B. parapertussis, PCR Not detected        Bordetella pertussis - PCR Not detected        Chlamydophila pneumoniae DNA, QL, PCR Not detected        Mycoplasma pneumoniae DNA, QL, PCR Not detected                 Urinalysis Color   Date Value Ref Range Status   10/08/2021 DARK YELLOW   Final     Appearance   Date Value Ref Range Status   10/08/2021 TURBID   Final     Specific gravity   Date Value Ref Range Status   10/08/2021 1.020 1.005 - 1.030   Final     pH (UA)   Date Value Ref Range Status   10/08/2021 8.0 5.0 - 8.0   Final     Protein   Date Value Ref Range Status   10/08/2021 >1,000 (A) NEG mg/dL Final     Ketone   Date Value Ref Range Status   10/08/2021 Negative NEG mg/dL Final     Bilirubin   Date Value Ref Range Status   10/08/2021 Negative NEG   Final     Blood   Date Value Ref Range Status   10/08/2021 LARGE (A) NEG   Final     Urobilinogen   Date Value Ref Range Status   10/08/2021 1.0 0.2 - 1.0 EU/dL Final     Nitrites   Date Value Ref Range Status   10/08/2021 Negative NEG   Final     Leukocyte Esterase   Date Value Ref Range Status   10/08/2021 LARGE (A) NEG   Final     Potassium   Date Value Ref Range Status   10/11/2021 4.7 3.5 - 5.5 mmol/L Final     Creatinine   Date Value Ref Range Status   10/11/2021 8.03 (H) 0.6 - 1.3 MG/DL Final     BUN   Date Value Ref Range Status   10/11/2021 51 (H) 7.0 - 18 MG/DL Final      PSA No results for input(s): PSA in the last 72 hours.    Coagulation Lab Results   Component Value Date/Time    Prothrombin time 13.6 10/08/2021 03:50 PM    Prothrombin time 14.2 10/14/2020 11:56 AM    INR 1.1 10/08/2021 03:50 PM    INR 1.1 10/14/2020 11:56 AM    aPTT 35.4 10/14/2020 11:56 AM    aPTT 46.9 (H) 07/16/2020 04:52 PM         Patient Active Problem List   Diagnosis Code    Nausea & vomiting R11.2    Pyelonephritis V26    Complicated urinary tract infection N39.0    Anemia of chronic renal failure N18.9, D63.1    Anemia D64.9    Hypotension I95.9    UTI (urinary tract infection) N39.0    Type 2 diabetes mellitus, without long-term current use of insulin ([facility]) E11.9    CKD (chronic kidney disease) stage 5, GFR less than 15 ml/min ([facility]) N18.5    Acquired hypothyroidism E03.9    GERD (gastroesophageal reflux disease) W02.5    Complicated UTI (urinary tract infection) N39.0    Disease of the lower urinary tract N39.9    Sepsis ([facility]) A41.9    Tachycardia R00.0    ESRD (end stage renal disease) on dialysis ([facility]) N18.6, Z99.2    Hydronephrosis N13.30    Septic shock ([facility]) A41.9, R65.21

## 2021-10-11 NOTE — PROGRESS NOTES
[address] [place] [address] [facility]. Pulmonary, Critical Care, and Sleep Medicine    Name: Justina Mcgarry MRN: [id number]   : 1945 Hospital: [address] [place] [address] Dr   Date: 10/11/2021  Admission Date: 10/8/2021     Chart and notes reviewed. Data reviewed. I have evaluated all findings. [x]I have reviewed the flowsheet and previous days notes. []The patient is unable to give any meaningful history or review of systems because the patient is:  []Intubated []Sedated   []Unresponsive      [x]The patient is critically ill on      []Mechanical ventilation [x]Pressors   []BiPAP []         Interval HPI:Patient is a 64 y.o. female w/ pmhx of chronic hypotension on midodrine, ESRD on HD, R ureteral stricture with stent in place, recently exchanged on 10/5, and frequent UTIs who presented to the ED complaining of lightheadedness onset yesterday. She reported that she went to dialysis as usual and had a normal run of HD. She reports when her lightheadedness did not improve she decided to come to the ED. Admitted for shock on levophed. Subjective 10/11/21  Hospital Day: 3  No events overnight  SBP drops into 70's using bedside commode  Otherwise off vasopressors and stable  No complaints, doing well otherwise  Remain afebrile and w/o white count                 ROS:A comprehensive review of systems was negative except for that written in the HPI. Events and notes from last 24 hours reviewed. Care plan discussed on multidisciplinary rounds.   Patient Active Problem List   Diagnosis Code    Nausea & vomiting R11.2    Pyelonephritis T63    Complicated urinary tract infection N39.0    Anemia of chronic renal failure N18.9, D63.1    Anemia D64.9    Hypotension I95.9    UTI (urinary tract infection) N39.0    Type 2 diabetes mellitus, without long-term current use of insulin ([facility]) E11.9    CKD (chronic kidney disease) stage 5, GFR less than 15 ml/min ([facility]) N18.5    Acquired hypothyroidism E03.9    GERD (gastroesophageal reflux disease) B65.0    Complicated UTI (urinary tract infection) N39.0    Disease of the lower urinary tract N39.9    Sepsis ([facility]) A41.9    Tachycardia R00.0    ESRD (end stage renal disease) on dialysis ([facility]) N18.6, Z99.2    Hydronephrosis N13.30    Septic shock ([facility]) A41.9, R65.21       Vital Signs:  Visit Vitals  /62   Pulse 65   Temp 97.9 °F (36.6 °C)   Resp 16   Ht 5' 2\" (1.575 m)   Wt 93.9 kg (207 lb)   SpO2 98%   BMI 37.86 kg/m²       O2 Device: None (Room air)       Temp (24hrs), Av.9 °F (36.6 °C), Min:97.9 °F (36.6 °C), Max:97.9 °F (36.6 °C)       Intake/Output:   Last shift:      No intake/output data recorded. Last 3 shifts: No intake/output data recorded.   No intake or output data in the 24 hours ending 10/11/21 4635       Current Facility-Administered Medications   Medication Dose Route Frequency    [START ON 10/13/2021] Vancomycin Lab Information: Random Level Due  1 Each Other ONCE    [START ON 10/13/2021] Gentamicin Lab Information: Random Level Due  1 Each Other ONCE    fludrocortisone (FLORINEF) tablet 0.2 mg  0.2 mg Oral DAILY    midodrine (PROAMATINE) tablet 10 mg  10 mg Oral TID WITH MEALS    epoetin caitlin-epbx (RETACRIT) injection 8,000 Units  8,000 Units SubCUTAneous Q MON, WED & FRI    cefepime (MAXIPIME) 1 g in sterile water (preservative free) 10 mL IV syringe  1 g IntraVENous Q24H    famotidine (PF) (PEPCID) 20 mg in 0.9% sodium chloride 10 mL injection  20 mg IntraVENous Q24H    Gentamicin: Pharmacy to dose   Other Rx Dosing/Monitoring    NOREPINephrine (LEVOPHED) 8 mg in 5% dextrose 250mL (32 mcg/mL) infusion  0.5-30 mcg/min IntraVENous TITRATE    VANCOMYCIN INFORMATION NOTE   Other Rx Dosing/Monitoring    insulin lispro (HUMALOG) injection   SubCUTAneous Q6H    heparin (porcine) injection 5,000 Units  5,000 Units SubCUTAneous Q8H         Telemetry: [x]Sinus []A-flutter []Paced    []A-fib []Multiple PVCs                  Physical Exam: General:NAD, appears stated age   [de-identified]:  Anicteric sclerae; pink palpebral conjunctivae; mucosa moist  Resp:  Symmetrical chest expansion, no accessory muscle use; good airway entry; no stridor  CV:  S1, S2 present; regular rate and rhythm  GI:  Abdomen soft, non-tender;non distended   Extremities:  Normal appearing x4  Skin:  Warm; no rashes/ lesions noted, normal turgor/cap refill   Neurologic:  Non-focal  Devices:  CVL      DATA:  MAR reviewed and pertinent medications noted or modified as needed    Labs:  Recent Labs     10/11/21  0535 10/10/21  0937 10/09/21  0455   WBC 8.0 8.0 10.9   HGB 8.8* 9.2* 10.5*   HCT 28.2* 29.8* 33.9*    254 281     Recent Labs     10/11/21  0535 10/10/21  0511 10/09/21  0455 10/08/21  1550 10/08/21  1550    138 137   < > 135*   K 4.7 5.3 4.4   < > 3.3*    98* 97*   < > 97*   CO2 29 31 30   < > 32   GLU 85 101* 142*   < > 144*   BUN 51* 37* 20*   < > 13   CREA 8.03* 6.25* 4.51*   < > 3.36*   CA 8.5 8.6 8.7   < > 8.7   MG 2.5 2.4 2.3   < > 2.1   PHOS 6.6* 6.2* 5.4*  --   --    ALB  --   --   --   --  3.1*   ALT  --   --   --   --  12*   INR  --   --   --   --  1.1    < > = values in this interval not displayed. No results for input(s): PH, PCO2, PO2, HCO3, FIO2 in the last 72 hours. No results for input(s): FIO2I, IFO2, HCO3I, IHCO3, HCOPOC, PCO2I, PCOPOC, IPHI, PHI, PHPOC, PO2I, PO2POC in the last 72 hours. No lab exists for component: IPOC2    Imaging:  [x]   I have personally reviewed the patients radiographs and reports  XR Results (most recent):  XR Results (most recent):  Results from Hospital Encounter encounter on 10/08/21    XR CHEST PORT    Narrative  Portable Chest    CPT CODE: 33375    HISTORY: Line placements. FINDINGS:    Compared with study done earlier the same day at 1521 hours. Right internal jugular line tip at the proximal SVC. Additional wires overlie  the patient. Surgical clips at the epigastrium.  Ectasia of the descending  thoracic aorta elevated diaphragm on the right stable. No pneumothorax, effusion  or new lung consolidation. Impression  Right CVL tip at the proximal SVC. No pneumothorax. CT Results (most recent):  Results from Hospital Encounter encounter on 10/08/21    CT HEAD WO CONT    Narrative  CT Head without contrast    HISTORY: Hypotension, dizziness. COMPARISON: None    TECHNIQUE:  Axial imaging from the skull base to the vertex was performed  without intravenous contrast.  Coronal and sagittal reformations. All CT scans  are performed using dose optimization techniques as appropriate to the performed  exam including the following: Automated exposure control, adjustment of mA  and/or kV according to patient size, and use of iterative reconstructive  technique. FINDINGS:    No intracranial hemorrhage. No evidence of midline shift, mass effect, or  obvious mass lesion. There is preservation of the gray and white matter  differentiation, no acute infarct (Please note that CT is less sensitive in the  detection of early infarct). Moderately severe periventricular white matter changes of the cerebrum. The size of the ventricles and sulci are normal.  No extra axial fluid  collections. Heavily calcified intracranial carotid arteries. Visualized paranasal sinuses are clear. No evidence for skull fracture. Impression  No CT finding for acute territorial infarct or acute intracranial hemorrhage. Fairly extensive periventricular white matter change. Nonspecific but often  associated with chronic microvascular ischemic disease. 11/11/20    ECHO ADULT COMPLETE 11/11/2020 11/11/2020    Interpretation Summary  · LV: Estimated LVEF is 55 - 60%. Normal cavity size, systolic function (ejection fraction normal) and diastolic function. Mild to moderate hypertrophy. Wall motion: normal.  · LA: Left Atrium volume index is 31.48 mL/m2.   · PA: Pulmonary arterial systolic pressure is 22 mmHg.    Signed by: Mahalia Kanner, MD on 11/11/2020 12:50 PM       IMPRESSION:   · Shock - procal remains low, no other s/o sepsis, ? Worsening chronic hypotension. · Patient has R ureteral stricture managed with nephrostomy tube initially and converted to stent 6 mo ago. S/P exchange of chronic R ureteral stent on 10/5. · Lactic acidosis - resolved  · ESRD on HD - makes small amount of urine  · Chronic hypotension on midodrine, patient states she is symptomatic with BPs in 80s and feels best with BPs closer to 100's. Does report tremors/RLS with midodrine   · Hx DM      Patient Active Problem List   Diagnosis Code    Nausea & vomiting R11.2    Pyelonephritis J98    Complicated urinary tract infection N39.0    Anemia of chronic renal failure N18.9, D63.1    Anemia D64.9    Hypotension I95.9    UTI (urinary tract infection) N39.0    Type 2 diabetes mellitus, without long-term current use of insulin ([facility]) E11.9    CKD (chronic kidney disease) stage 5, GFR less than 15 ml/min ([facility]) N18.5    Acquired hypothyroidism E03.9    GERD (gastroesophageal reflux disease) T33.7    Complicated UTI (urinary tract infection) N39.0    Disease of the lower urinary tract N39.9    Sepsis ([facility]) A41.9    Tachycardia R00.0    ESRD (end stage renal disease) on dialysis ([facility]) N18.6, Z99.2    Hydronephrosis N13.30    Septic shock ([facility]) A41.9, R65.21        RECOMMENDATIONS:   Neuro: no acute issues   Pulm: Aspiration precautions, HOB>30'. CVS:Monitor HD, systolic goal >44, patient midodrine restarted at higher dose  GI:  Diet , SUP while on pressors  Renal: Trend Cr, UOP. HD per nephro, florinef in [facility], patient reported not taking on 10/1 but was on end of September, increased dose because it can help with orthostatic hypotension, cont' midodrine  Hem/Onc: Trend H/H, monitor for s/o active bleeding. Daily CBC. I/D:Trend WBCs and temperature curve.  procal neg, merrem, vanco started in ED, BCx2, UC from 10/5 negative, f/u hydro of right ureter, , 1 BC (+) GPC overnight but no fevers or white count, recheck procal   Metabolic: Daily BMP, mag, phos. Trend lytes and replace per protocol. Endocrine:ACHS glucoses. SSI. Avoid hypoglycemia. Musc/Skin: no acute issues   Full Code  Discussed in interdisciplinary rounds  Can likely downgrade from ICU today if remains off levophed     Best practice :    Glycemic control  IHI ICU bundles:   Central Line Bundle Followed     [facility] Vent patients- VAP bundle, aim to keep peak plateau pressure [id number] H2O  Sress ulcer prophylaxis. DVT prophylaxis. Need for Lines, chua assessed. Palliative care evaluation. Restraints need. High complexity decision making was performed during this consultation and evaluation. [x]       Pt is at high risk for further organ failure and dysfunction. Critical care time spent:  31  minutes with patient exclusive of procedures.     Jojo Duffy PA-C   10/11/21  Pulmonary, Critical Care Medicine  [facility]

## 2021-10-11 NOTE — PROGRESS NOTES
completed the initial Spiritual Assessment of the patient in bed 2 of the emergency room, and offered Pastoral Care, Patient does not have any Taoist/cultural needs that will affect patients preferences in health care. Chaplains will continue to follow and will provide pastoral care on an as needed/requested basis.     [place]  Spiritual Care Department  [phone]

## 2021-10-12 LAB
ANION GAP SERPL CALC-SCNC: 9 MMOL/L (ref 3–18)
ATRIAL RATE: 95 BPM
BUN SERPL-MCNC: 28 MG/DL (ref 7–18)
BUN/CREAT SERPL: 5 (ref 12–20)
CALCIUM SERPL-MCNC: 8.7 MG/DL (ref 8.5–10.1)
CALCULATED P AXIS, ECG09: 33 DEGREES
CALCULATED R AXIS, ECG10: -35 DEGREES
CALCULATED T AXIS, ECG11: 36 DEGREES
CHLORIDE SERPL-SCNC: 103 MMOL/L (ref 100–111)
CO2 SERPL-SCNC: 26 MMOL/L (ref 21–32)
CREAT SERPL-MCNC: 5.71 MG/DL (ref 0.6–1.3)
DIAGNOSIS, 93000: NORMAL
ERYTHROCYTE [DISTWIDTH] IN BLOOD BY AUTOMATED COUNT: 15.7 % (ref 11.6–14.5)
GLUCOSE SERPL-MCNC: 134 MG/DL (ref 74–99)
HCT VFR BLD AUTO: 30.3 % (ref 35–45)
HGB BLD-MCNC: 9.5 G/DL (ref 12–16)
MAGNESIUM SERPL-MCNC: 2.3 MG/DL (ref 1.6–2.6)
MCH RBC QN AUTO: 30 PG (ref 24–34)
MCHC RBC AUTO-ENTMCNC: 31.4 G/DL (ref 31–37)
MCV RBC AUTO: 95.6 FL (ref 78–100)
P-R INTERVAL, ECG05: 188 MS
PHOSPHATE SERPL-MCNC: 5.2 MG/DL (ref 2.5–4.9)
PLATELET # BLD AUTO: 246 K/UL (ref 135–420)
PMV BLD AUTO: 10 FL (ref 9.2–11.8)
POTASSIUM SERPL-SCNC: 4.9 MMOL/L (ref 3.5–5.5)
Q-T INTERVAL, ECG07: 354 MS
QRS DURATION, ECG06: 82 MS
QTC CALCULATION (BEZET), ECG08: 444 MS
RBC # BLD AUTO: 3.17 M/UL (ref 4.2–5.3)
SODIUM SERPL-SCNC: 138 MMOL/L (ref 136–145)
VENTRICULAR RATE, ECG03: 95 BPM
WBC # BLD AUTO: 5.4 K/UL (ref 4.6–13.2)

## 2021-10-12 PROCEDURE — 74011000258 HC RX REV CODE- 258: Performed by: FAMILY MEDICINE

## 2021-10-12 PROCEDURE — 74011250637 HC RX REV CODE- 250/637: Performed by: INTERNAL MEDICINE

## 2021-10-12 PROCEDURE — 65660000000 HC RM CCU STEPDOWN

## 2021-10-12 PROCEDURE — 74011000250 HC RX REV CODE- 250: Performed by: PHYSICIAN ASSISTANT

## 2021-10-12 PROCEDURE — 84100 ASSAY OF PHOSPHORUS: CPT

## 2021-10-12 PROCEDURE — 83735 ASSAY OF MAGNESIUM: CPT

## 2021-10-12 PROCEDURE — 74011250637 HC RX REV CODE- 250/637: Performed by: STUDENT IN AN ORGANIZED HEALTH CARE EDUCATION/TRAINING PROGRAM

## 2021-10-12 PROCEDURE — 97161 PT EVAL LOW COMPLEX 20 MIN: CPT

## 2021-10-12 PROCEDURE — 2709999900 HC NON-CHARGEABLE SUPPLY

## 2021-10-12 PROCEDURE — 74011250636 HC RX REV CODE- 250/636: Performed by: PHYSICIAN ASSISTANT

## 2021-10-12 PROCEDURE — 36415 COLL VENOUS BLD VENIPUNCTURE: CPT

## 2021-10-12 PROCEDURE — 97530 THERAPEUTIC ACTIVITIES: CPT

## 2021-10-12 PROCEDURE — 74011000250 HC RX REV CODE- 250: Performed by: STUDENT IN AN ORGANIZED HEALTH CARE EDUCATION/TRAINING PROGRAM

## 2021-10-12 PROCEDURE — 74011250636 HC RX REV CODE- 250/636: Performed by: FAMILY MEDICINE

## 2021-10-12 PROCEDURE — 93005 ELECTROCARDIOGRAM TRACING: CPT

## 2021-10-12 PROCEDURE — 74011250636 HC RX REV CODE- 250/636: Performed by: STUDENT IN AN ORGANIZED HEALTH CARE EDUCATION/TRAINING PROGRAM

## 2021-10-12 PROCEDURE — 74011000250 HC RX REV CODE- 250

## 2021-10-12 PROCEDURE — 80048 BASIC METABOLIC PNL TOTAL CA: CPT

## 2021-10-12 PROCEDURE — 74011250637 HC RX REV CODE- 250/637

## 2021-10-12 PROCEDURE — 74011250637 HC RX REV CODE- 250/637: Performed by: PHYSICIAN ASSISTANT

## 2021-10-12 PROCEDURE — 85027 COMPLETE CBC AUTOMATED: CPT

## 2021-10-12 RX ORDER — ACETAMINOPHEN 325 MG/1
650 TABLET ORAL ONCE
Status: COMPLETED | OUTPATIENT
Start: 2021-10-12 | End: 2021-10-12

## 2021-10-12 RX ORDER — GABAPENTIN 100 MG/1
200 CAPSULE ORAL ONCE
Status: COMPLETED | OUTPATIENT
Start: 2021-10-12 | End: 2021-10-12

## 2021-10-12 RX ORDER — LATANOPROST 50 UG/ML
1 SOLUTION/ DROPS OPHTHALMIC EVERY EVENING
Status: DISCONTINUED | OUTPATIENT
Start: 2021-10-12 | End: 2021-10-14 | Stop reason: HOSPADM

## 2021-10-12 RX ORDER — ACETAMINOPHEN 500 MG
500 TABLET ORAL
Status: DISCONTINUED | OUTPATIENT
Start: 2021-10-12 | End: 2021-10-12

## 2021-10-12 RX ORDER — ACETAMINOPHEN 325 MG/1
650 TABLET ORAL
Status: DISCONTINUED | OUTPATIENT
Start: 2021-10-12 | End: 2021-10-14 | Stop reason: HOSPADM

## 2021-10-12 RX ORDER — FLUCONAZOLE 200 MG/1
800 TABLET ORAL
Status: COMPLETED | OUTPATIENT
Start: 2021-10-12 | End: 2021-10-12

## 2021-10-12 RX ORDER — SODIUM CHLORIDE 9 MG/ML
500 INJECTION, SOLUTION INTRAVENOUS ONCE
Status: COMPLETED | OUTPATIENT
Start: 2021-10-12 | End: 2021-10-12

## 2021-10-12 RX ORDER — GABAPENTIN 100 MG/1
200 CAPSULE ORAL 3 TIMES WEEKLY
Status: DISCONTINUED | OUTPATIENT
Start: 2021-10-13 | End: 2021-10-14 | Stop reason: HOSPADM

## 2021-10-12 RX ORDER — FLUCONAZOLE 200 MG/1
400 TABLET ORAL
Status: DISCONTINUED | OUTPATIENT
Start: 2021-10-13 | End: 2021-10-14 | Stop reason: HOSPADM

## 2021-10-12 RX ORDER — LIDOCAINE 4 G/100G
1 PATCH TOPICAL EVERY 24 HOURS
Status: DISCONTINUED | OUTPATIENT
Start: 2021-10-12 | End: 2021-10-14 | Stop reason: HOSPADM

## 2021-10-12 RX ADMIN — HEPARIN SODIUM 5000 UNITS: 5000 INJECTION INTRAVENOUS; SUBCUTANEOUS at 13:56

## 2021-10-12 RX ADMIN — FLUDROCORTISONE ACETATE 0.2 MG: 0.1 TABLET ORAL at 09:13

## 2021-10-12 RX ADMIN — GABAPENTIN 200 MG: 100 CAPSULE ORAL at 04:59

## 2021-10-12 RX ADMIN — LEVOTHYROXINE SODIUM 125 MCG: 25 TABLET ORAL at 09:13

## 2021-10-12 RX ADMIN — ACETAMINOPHEN 650 MG: 325 TABLET ORAL at 09:13

## 2021-10-12 RX ADMIN — MIDODRINE HYDROCHLORIDE 10 MG: 5 TABLET ORAL at 16:22

## 2021-10-12 RX ADMIN — FLUCONAZOLE 800 MG: 200 TABLET ORAL at 21:31

## 2021-10-12 RX ADMIN — SODIUM CHLORIDE 500 ML: 900 INJECTION, SOLUTION INTRAVENOUS at 13:57

## 2021-10-12 RX ADMIN — FAMOTIDINE 20 MG: 10 INJECTION INTRAVENOUS at 09:13

## 2021-10-12 RX ADMIN — SODIUM CHLORIDE 500 ML: 900 INJECTION, SOLUTION INTRAVENOUS at 16:25

## 2021-10-12 RX ADMIN — HEPARIN SODIUM 5000 UNITS: 5000 INJECTION INTRAVENOUS; SUBCUTANEOUS at 05:58

## 2021-10-12 RX ADMIN — LATANOPROST 1 DROP: 50 SOLUTION OPHTHALMIC at 23:13

## 2021-10-12 RX ADMIN — HEPARIN SODIUM 5000 UNITS: 5000 INJECTION INTRAVENOUS; SUBCUTANEOUS at 21:31

## 2021-10-12 RX ADMIN — MIDODRINE HYDROCHLORIDE 10 MG: 5 TABLET ORAL at 13:56

## 2021-10-12 RX ADMIN — SODIUM CHLORIDE 70 MG: 9 INJECTION, SOLUTION INTRAVENOUS at 08:16

## 2021-10-12 RX ADMIN — MIDODRINE HYDROCHLORIDE 10 MG: 5 TABLET ORAL at 09:13

## 2021-10-12 NOTE — PROGRESS NOTES
Brief Progress Note  [facility]      Evaluated patient at bedside regarding Code status. Per patient has acp on file in [facility] clinic. Patient is DNR/DNI.      Jose Whitfield MD, PGY-2   [facility]   October 12, 2021, 2:47 PM

## 2021-10-12 NOTE — PROGRESS NOTES
Brief Progress Note  [address]      Team contacted regarding low bp this afternoon. Evaluated by cards earlier today, given one time 500 cc bolus. Patient to receive another 500 cc bolus per clearance from nephro. Patient scheduled for third dose midodrine this evening. Consider given early dose. If initial modalities do not help in maintaining goal MAP 65. Consideration restarting levo drip with potential transfer once again to ICU.      Michelle Andrews MD, PGY-2   [facility]   October 12, 2021, 4:36 PM

## 2021-10-12 NOTE — CONSULTS
Cardiology Initial Patient Referral Note    Cardiology referral request from Dr. Luisana Brunner for evaluation and management/treatment of atrial fibrillation with rapid ventricular response complicated with hypotension. Date of  Admission: 10/8/2021  2:50 PM   Primary Care Physician:  Jocelyn Harp MD    Attending Cardiologist: Dr. Saba Cap:     Hospital Problems  Date Reviewed: 9/21/2021        Codes Class Noted POA    Septic shock ([facility] [address] ICD-10-CM: A41.9, R65.21  ICD-9-CM: 038.9, 785.52, 995.92  10/8/2021 Unknown        * (Principal) Hypotension ICD-10-CM: I95.9  ICD-9-CM: 458.9  5/15/2020 Yes              -SVT appears to have new onset paroxysmal atrial fibrillation/flutter with elevated rates up to 180s with any exertion. In setting of acute illness. Currently NSR. -Sepsis with recurrent UTIs. Presented from HD center with hypotension. Admitted with sepsis requiring pressor support. Improving.  -Hypotension in setting of sepsis requiring pressor support now improved. H/o hypotension on midodrine as outpatient.  -Ureteral stricture s/p stent exchange 10/5.  -Diabetes mellitus. -ESRD on HD. -Hypothyroidism on synthroid.  -Chronic anemia.  -Chronic back pain. -Depression.  -Normal EF 55-60% by echo 10/2021.  -Low risk nuclear stress 11/2020. Primary cardiologist Dr. Keri Parish.       Atrial fibrillation CHADSVASC2 score stroke risk:   64 y.o.                                        > 76        +2   female                                         Female +1  CHF hx                                           No    + 0  HTN hx                                           No    + 0  Stroke/TIA/Thromboembolism       No    +0  Vascular disease hx                       No    + 0  Diabetes Mellitus                            Yes    +1   CHADSVASC2 score                    4      Annual Stroke Risk 4.8%- moderate-high        Plan:     Noted intermittent elevated HR with activity, rate control limited by hypotension, suspect related to recent illness with sepsis and likely volume depletion. Will give challenge of IVF today. Will start amiodarone 24 hour load. If recurrent and persistent atrial fibrillation/flutter will need to consider anticoagulation if no concerns of bleeding. Patient remains on florinef and synthroid for hypotension. Continue cautious volume management with HD as hemodynamics allow. Continue antibiotic and urology recommendations. Staff addendum:  Patient has chronic hypotension despite midodrine, tachycardic when gets up/symptomatic. Unclear if SVT vs A.tach. Plan amiodarone given severe symptoms and IVF. She does not appear fluid overloaded so I would minimize fluid removal during HD. Discussed with patient. I saw, examined, and evaluated the patient. I personally reviewed the patient's labs, tests, vitals, orders, medications, updated history, and other providers assessments. I personally agree with the findings as stated and the plan as documented. Emily Denis MD       History of Present Illness: This is a 64 y.o. female admitted for Septic shock ([facility] [A41.9, R65.21]. Patient complains of: Tachycardia. Patient is a 64year old female who presented to ER on Friday from HD for low BP. Patient was admitted with sepsis and UTI. Patient required pressor support. Patient was treated with Abx. Sepsis improving. Patient is now off pressor support. Patient is noted to have elevated HR with any activity. Patient reports palpitations and weakness with any activity. Patient reports these symptoms started last week. Patient has history of hypotension for which she takes midodrine.  Patient          Cardiac risk factors: diabetes mellitus      Review of Symptoms:  Except as stated above include:  Constitutional:  negative  Respiratory:  negative  Cardiovascular:  Reports palpitations, fatigue, dizziness  Gastrointestinal: negative  Genitourinary:  negative  Musculoskeletal: Negative  Neurological:  Negative  Dermatological:  Negative  Endocrinological: Negative  Psychological:  Negative       Past Medical History:     Past Medical History:   Diagnosis Date    Acidosis     Anemia     Arteriovenous fistula (HCC)     Chronic kidney disease     on HD at [facility] on [address] on [address]. [phone]    Chronic pain     CKD (chronic kidney disease)     Diabetes ([facility]     no meds now    ESRD (end stage renal disease) on dialysis ([facility] 9/9/2021    HLD (hyperlipidemia)     HTN (hypertension)     Hyperparathyroidism due to renal insufficiency (HCC)     Hypothyroid     Kidney stone     Lung mass     Recurrent UTI     Ureter, stricture     Uric acid nephrolithiasis     Urinary incontinence          Social History:     Social History     Socioeconomic History    Marital status: SINGLE     Spouse name: Not on file    Number of children: Not on file    Years of education: Not on file    Highest education level: Not on file   Tobacco Use    Smoking status: Never Smoker    Smokeless tobacco: Never Used   Vaping Use    Vaping Use: Never used   Substance and Sexual Activity    Alcohol use: Never    Drug use: Never     Social Determinants of Health     Financial Resource Strain:     Difficulty of Paying Living Expenses:    Food Insecurity:     Worried About Running Out of Food in the Last Year:     Ran Out of Food in the Last Year:    Transportation Needs:     Lack of Transportation (Medical):      Lack of Transportation (Non-Medical):    Physical Activity:     Days of Exercise per Week:     Minutes of Exercise per Session:    Stress:     Feeling of Stress :    Social Connections:     Frequency of Communication with Friends and Family:     Frequency of Social Gatherings with Friends and Family:     Attends Mandaen Services:     Active Member of Clubs or Organizations:     Attends Club or Organization Meetings:     Marital Status:         Family History:     Family History Problem Relation Age of Onset    Heart Surgery Sister         Medications:      Allergies   Allergen Reactions    Ciprofloxacin Hives    Cyclopentolate Unknown (comments)    Iron Sucrose Diarrhea    Midodrine Unknown (comments)     Denies 910/1/21    Statins-Hmg-Coa Reductase Inhibitors Other (comments)     Body ache        Current Facility-Administered Medications   Medication Dose Route Frequency    lidocaine 4 % patch 1 Patch  1 Patch TransDERmal Q24H    [START ON 10/13/2021] gabapentin (NEURONTIN) capsule 200 mg  200 mg Oral 3 times weekly    acetaminophen (TYLENOL) tablet 650 mg  650 mg Oral Q6H PRN    levothyroxine (SYNTHROID) tablet 125 mcg  125 mcg Oral 6am    latanoprost (XALATAN) 0.005 % ophthalmic solution 1 Drop  1 Drop Both Eyes QPM    [START ON 10/13/2021] Vancomycin Lab Information: Random Level Due  1 Each Other ONCE    [START ON 10/13/2021] Gentamicin Lab Information: Random Level Due  1 Each Other ONCE    fludrocortisone (FLORINEF) tablet 0.2 mg  0.2 mg Oral DAILY    midodrine (PROAMATINE) tablet 10 mg  10 mg Oral TID WITH MEALS    epoetin caitlin-epbx (RETACRIT) injection 8,000 Units  8,000 Units SubCUTAneous Q MON, WED & FRI    famotidine (PF) (PEPCID) 20 mg in 0.9% sodium chloride 10 mL injection  20 mg IntraVENous Q24H    GENTAMICIN INFORMATION NOTE   Other Rx Dosing/Monitoring    VANCOMYCIN INFORMATION NOTE   Other Rx Dosing/Monitoring    diphenhydrAMINE-zinc acetate 2%-0.1% (BENADRYL) cream   Topical TID PRN    glucose chewable tablet 16 g  4 Tablet Oral PRN    glucagon (GLUCAGEN) injection 1 mg  1 mg IntraMUSCular PRN    dextrose (D50W) injection syrg 12.5-25 g  25-50 mL IntraVENous PRN    heparin (porcine) injection 5,000 Units  5,000 Units SubCUTAneous Q8H         Physical Exam:     Visit Vitals  BP (!) 86/52 (BP 1 Location: Right upper arm, BP Patient Position: At rest)   Pulse 93   Temp 98 °F (36.7 °C)   Resp 18   Ht 5' 2\" (1.575 m)   Wt 207 lb (93.9 kg)   SpO2 98% BMI 37.86 kg/m²       TELE: normal sinus rhythm, SVT. BP Readings from Last 3 Encounters:   10/12/21 (!) 86/52   10/05/21 101/60   09/09/21 118/68     Pulse Readings from Last 3 Encounters:   10/12/21 93   10/05/21 86   09/09/21 (!) 58     Wt Readings from Last 3 Encounters:   10/10/21 207 lb (93.9 kg)   10/05/21 208 lb (94.3 kg)   09/21/21 205 lb 0.4 oz (93 kg)       General:  alert, cooperative, no distress, appears stated age  Neck:  no JVD  Lungs:  clear to auscultation bilaterally  Heart:  regular rate and rhythm  Abdomen:  abdomen is soft without significant tenderness, masses, organomegaly or guarding  Extremities:  extremities normal, atraumatic, no cyanosis or edema  Skin: Warm and dry.  no hyperpigmentation, vitiligo, or suspicious lesions  Neuro: alert, oriented x3, affect appropriate  Psych: non focal     Data Review:     Recent Labs     10/12/21  1014 10/11/21  0535 10/10/21  0937   WBC 5.4 8.0 8.0   HGB 9.5* 8.8* 9.2*   HCT 30.3* 28.2* 29.8*    245 254     Recent Labs     10/12/21  1014 10/11/21  0535 10/10/21  0511    138 138   K 4.9 4.7 5.3    101 98*   CO2 26 29 31   * 85 101*   BUN 28* 51* 37*   CREA 5.71* 8.03* 6.25*   CA 8.7 8.5 8.6   MG 2.3 2.5 2.4   PHOS 5.2* 6.6* 6.2*       Results for orders placed or performed during the hospital encounter of 10/08/21   EKG, 12 LEAD, INITIAL   Result Value Ref Range    Ventricular Rate 95 BPM    Atrial Rate 95 BPM    P-R Interval 188 ms    QRS Duration 82 ms    Q-T Interval 354 ms    QTC Calculation (Bezet) 444 ms    Calculated P Axis 33 degrees    Calculated R Axis -35 degrees    Calculated T Axis 36 degrees    Diagnosis       Normal sinus rhythm  Left axis deviation  Abnormal ECG  When compared with ECG of 08-OCT-2021 15:20,  No significant change was found         All Cardiac Markers in the last 24 hours:  No results found for: CPK, CK, CKMMB, CKMB, RCK3, CKMBT, CKNDX, CKND1, PATTI, TROPT, TROIQ, GRAHAM, TROPT, TNIPOC, BNP, BNPP    Last Lipid:  No results found for: CHOL, CHOLX, CHLST, CHOLV, HDL, HDLP, LDL, LDLC, DLDLP, TGLX, TRIGL, TRIGP, CHHD, CHHDX    Cardiographics:     EKG Results     Procedure 720 Value Units Date/Time    EKG, 12 LEAD, INITIAL [[id number]] Collected: 10/12/21 0956    Order Status: Completed Updated: 10/12/21 1024     Ventricular Rate 95 BPM      Atrial Rate 95 BPM      P-R Interval 188 ms      QRS Duration 82 ms      Q-T Interval 354 ms      QTC Calculation (Bezet) 444 ms      Calculated P Axis 33 degrees      Calculated R Axis -35 degrees      Calculated T Axis 36 degrees      Diagnosis --     Normal sinus rhythm  Left axis deviation  Abnormal ECG  When compared with ECG of 08-OCT-2021 15:20,  No significant change was found      EKG, 12 LEAD, INITIAL [[id number]] Collected: 10/08/21 1520    Order Status: Completed Updated: 10/09/21 0754     Ventricular Rate 97 BPM      Atrial Rate 97 BPM      P-R Interval 168 ms      QRS Duration 82 ms      Q-T Interval 344 ms      QTC Calculation (Bezet) 436 ms      Calculated P Axis -3 degrees      Calculated R Axis -31 degrees      Calculated T Axis 43 degrees      Diagnosis --     Normal sinus rhythm  Left axis deviation  Possible Anterior infarct (cited on or before 16-FEB-2021) vs poor r wave   progression  Abnormal ECG  When compared with ECG of 18-JUN-2021 17:54,  Sinus rhythm has replaced Atrial fibrillation  QRS axis shifted left  Nonspecific T wave abnormality no longer evident in Inferior leads  Confirmed by Carmen Potter ([phone]) on 10/9/2021 7:53:54 AM          10/08/21    ECHO ADULT FOLLOW-UP OR LIMITED 10/10/2021 10/10/2021    Interpretation Summary  · LV: Estimated LVEF is 55 - 60%. Visually measured ejection fraction. Normal cavity size and systolic function (ejection fraction normal). Mild concentric hypertrophy. Wall motion: normal. Age-appropriate left ventricular diastolic function. · PA: Pulmonary arterial systolic pressure is 35 mmHg.     Signed by: Nimesh Lopez MD on 10/10/2021 11:57 AM      11/11/20    NUCLEAR CARDIAC STRESS TEST 11/11/2020 11/11/2020    Interpretation Summary  · Baseline ECG: Normal sinus rhythm. · Negative stress test.  · Gated SPECT: Left ventricular function post-stress was normal. Calculated ejection fraction is 67%. There is no evidence of transient ischemic dilation (TID). The TID ratio is 0.91.  · Myocardial perfusion imaging supports a low risk stress test.  · Left ventricular perfusion is normal.    Signed by: Sigifredo De Luna MD on 11/11/2020 12:47 PM    09/24/20    INVASIVE VASCULAR PROCEDURE 09/26/2020 9/26/2020    Narrative  Preoperative diagnosis: Left upper extremity malfunctioning arteriovenous fistula end-stage renal disease    Postoperative diagnosis: At the extremity malfunctioning tube and fistula with end-stage renal disease    Procedures performed:  #1  Left upper extremity fistulagram  #2  Central venogram  #3  Reflux arteriogram      Cultures: None    Specimens: None    Drains: None    Estimated blood loss: Less than 50 mL    Assistants: None    Implants: Please see above    Complications: None    Anesthesia: Moderate conscious sedation of 9 minutes was performed by an independent provider giving the medications per my discretion and monitoring of vital signs throughout the case. Indications for the procedure:  Mojgan Snowden is a 66 y.o. female with end-stage renal disease who was referred by dialysis center after they had difficulty cannulating her fistula. She states this is only occasionally depends on the tech. She has no other complaints. We discussed performing left lower extremity fistulogram.  Patient was given the appropriate risk and benefits of the procedure including but not limited to bleeding, infection, damage to adjacent structures, MI, stroke, death, loss of lower extremity, need for further surgery. Patient was understanding of all the risks and underwent a procedure.     Operative findings:  #1  No evidence of venous outflow stenosis or arterial inflow stenosis    Procedure:  Patient was correctly identified in the precath area and taken to the Cath Lab in stable condition. Patient had pre-incision timeout prior to any incision. Patient was prepped and draped in the normal sterile fashion according to [facility] guidelines aseptic technique. We then anesthetized the left arm with 1% lidocaine and obtain percutaneous access of the left lower extremity arteriovenous fistula. We then performed a fistulogram with central venogram reflux arteriogram.  No evidence of stenosis of the identified. I treated placed in the we advanced a manual wire and a micro-sheath. After completing our fistulogram we remove the micro sheath and held manual pressure for hemostasis. I was present scrubbed and entire procedure all counts were correct. Signed by: Triny Cortez MD on 9/26/2020 11:22 AM      XR Results (most recent):  Results from Hospital Encounter encounter on 10/08/21    XR CHEST PORT    Narrative  Portable Chest    CPT CODE: 37044    HISTORY: Line placements. FINDINGS:    Compared with study done earlier the same day at 1521 hours. Right internal jugular line tip at the proximal SVC. Additional wires overlie  the patient. Surgical clips at the epigastrium. Ectasia of the descending  thoracic aorta elevated diaphragm on the right stable. No pneumothorax, effusion  or new lung consolidation. Impression  Right CVL tip at the proximal SVC. No pneumothorax.         Signed By: NOVA Martinez     October 12, 2021

## 2021-10-12 NOTE — PROGRESS NOTES
Physician Progress Note      Renea Bernardo  [facility] #:                  [id number]  :                       1945  ADMIT DATE:       10/8/2021 2:50 PM  [address] DATE:  RESPONDING  PROVIDER #:        Santos Soria MD          QUERY TEXT:    Patient admitted with hypotension. Noted documentation of sepsis. In order to support the diagnosis of sepsis, please include additional clinical indicators in your documentation. Or please document if the diagnosis of sepsis has been ruled out after further study. The medical record reflects the following:  Risk Factors: 64 y.o. male with s/p ureteral stent exchange 4 days ago  Clinical Indicators: WBC WNL  Afebrile  Lactic 2.91, 1.5  Procal 0.42  Tachycardia  Treatment: ID and Uro consulted, vancomycin, gentamicin, urine and blood cultures    Thank you,  Erwin Sethi RN, [facility]  Options provided:  -- Sepsis present as evidenced by, Please document evidence. -- Sepsis was ruled out after study  -- Other - I will add my own diagnosis  -- Disagree - Not applicable / Not valid  -- Disagree - Clinically unable to determine / Unknown  -- Refer to Clinical Documentation Reviewer    PROVIDER RESPONSE TEXT:    Provider is clinically unable to determine a response to this query.     Query created by: Ellen Irwin on 10/12/2021 2:03 PM      Electronically signed by:  Santos Soria MD 10/12/2021 2:50 PM

## 2021-10-12 NOTE — PROGRESS NOTES
Intern Progress Note  [facility]       Patient: Justina Mcgarry MRN: [id number]  CSN: [id number]    YOB: 1945  Age: 64 y.o. Sex: female    DOA: 10/8/2021 LOS:  LOS: 4 days                    Subjective:        Acute events: Patient transferred out of ICU, patient remains of pressors since yesterday morning 0300 10/11. No acute events overnight. Patient this morning evaluated and awake and alert. Notes appropriate bowel movement and appetite. Denies dizziness upon sitting up. Notes patient feels much improved since admission, continues on iv abx. Patient concerned about right hand pain, believes it is due to iv placement post trying to lift herself up. Notes mild stinging no trauma to site per patient. Otherwise no acute concerns at this time.      ROS  General ROS: chills, fever  Respiratory ROS: cough,shortness of breath   Cardiovascular ROS: chest pain  Gastrointestinal ROS: abdominal pain, constipation, diarrhea,nausea/vomiting  Genito-Urinary ROS: dysuria, hematuria or urinary frequency/urgency  Musculoskeletal ROS: back pain   Neurological ROS: headaches    Objective:     Patient Vitals for the past 24 hrs:   Temp Pulse Resp BP SpO2   10/12/21 0410 98.1 °F (36.7 °C) 90 20 120/62 98 %   10/12/21 0208 98.1 °F (36.7 °C) 92 20 (!) 94/52 98 %   10/11/21 2330 -- 90 22 (!) 94/45 99 %   10/11/21 2314 97.9 °F (36.6 °C) 92 24 (!) 95/36 100 %   10/11/21 2301 -- 91 28 (!) 95/36 98 %   10/11/21 2245 -- 90 (!) 33 (!) 114/53 99 %   10/11/21 2230 -- 90 30 (!) 115/59 99 %   10/11/21 2215 -- 72 20 (!) 114/57 100 %   10/11/21 2200 -- 76 19 116/60 99 %   10/11/21 2145 -- 72 19 (!) 101/49 99 %   10/11/21 2130 -- 67 16 (!) 102/50 100 %   10/11/21 2115 -- 69 16 98/69 99 %   10/11/21 2100 -- 71 19 (!) 93/26 92 %   10/11/21 2045 -- 65 17 (!) 113/43 100 %   10/11/21 2030 -- 67 21 (!) 110/51 100 %   10/11/21 2015 -- 74 17 (!) 117/49 96 %   10/11/21 2000 -- 67 15 (!) 121/46 98 %   10/11/21 1945 -- 69 15 (!) 120/52 100 %   10/11/21 1930 -- 69 18 (!) 104/41 100 %   10/11/21 1927 -- 72 21 (!) 106/45 97 %   10/11/21 1901 98 °F (36.7 °C) 78 19 (!) 98/47 100 %   10/11/21 1500 -- 70 19 (!) 128/97 98 %   10/11/21 1445 -- 71 20 -- 93 %   10/11/21 1430 -- 72 18 (!) 99/59 98 %   10/11/21 1400 -- 78 24 109/71 98 %   10/11/21 1300 -- 84 20 138/60 (!) 64 %   10/11/21 1200 -- 74 19 112/63 96 %   10/11/21 0830 -- 75 13 (!) 105/38 99 %   10/11/21 0815 -- 76 (!) 41 (!) 117/55 98 %   10/11/21 0730 -- 84 17 (!) 88/48 99 %   10/11/21 0715 -- 81 18 (!) 108/48 95 %   10/11/21 0700 -- (!) 104 (!) 33 110/66 92 %   10/11/21 0645 -- 83 23 (!) 102/59 100 %   10/11/21 0630 -- 69 11 99/76 97 %   10/11/21 0615 -- 71 24 (!) 103/51 98 %         Intake/Output Summary (Last 24 hours) at 10/12/2021 0610  Last data filed at 10/11/2021 2245  Gross per 24 hour   Intake --   Output 500 ml   Net -500 ml       Physical Exam:   General:  AAOx3, NAD   CV:  RRR   RESP:  Unlabored breathing. Lungs clear to auscultation. ABD:  Soft, nontender, nondistended. MS:  No joint deformity. Neuro:  CN II-XII grossly intact. Ext:  No edema. Lab/Data Reviewed:  Recent Results (from the past 24 hour(s))   GLUCOSE, POC    Collection Time: 10/11/21  6:14 AM   Result Value Ref Range    Glucose (POC) 86 70 - 110 mg/dL   GLUCOSE, POC    Collection Time: 10/11/21  6:23 PM   Result Value Ref Range    Glucose (POC) 100 70 - 110 mg/dL       Assessment & Plan:     64 y.o. female with PMH of ESRD on HD, chronic hypotension on midodrine, ureteral stricture s/p stent by urology, recurrent UTIs, T2DM, depression, GERD, chronic back pain presented to the ED from dialysis center after having low BPs after an HD treatment with associated lightheadedness. Admitted for hypotension requiring pressors.      Acute on chronic hypotension, on midodrine at home  Concern for septic shock in setting of Hx recurrent UTIs  -Pt with chronically low Bps on midodrine in OP setting recently increased to 2.5 mg TID   -pt afebrile, no leukocytosis on admission. Lactic initially raised but has normalized. Procal 0.71.  -UA on admission positive for protein, blood, leukocyte esterase, bacteria, WBC, RBC   -BCx 10/8 positive for coagulase negative staph only 1 bottle, which may contamination  -UCx, BCx (10/10) wo growth to date  -ID, nephro consulted   -on levophed in ICU, d/c 10/11  -ECHO (11/11) EF 55-60. Normal systolic and diastolic fxn.    -current meds: midodrine 10 TID, gentamicin 70 mg, vancomycin 750 mg    -VS on exam: BP 95/36 stable off levophed. Pt is well appearing wo complaints at the time of exam. Stable for admission to step down.      Plan:   -appreciate recs per nephro, urology, ID   -c/w midodrine, fludrocortisone  -c/w gentamicin, vancomycin pending urine/bcx   -F/u UCx,  BCx   -daily BMP, CBC, Mg, Phos  -daily gentamicin, vanc levels      Anemia of chronic disease 2/2 renal disease   -Hb of 10.9, mcv 94.5  on admission. Now 8.8  -Managed by Nephro  Plan  -continue reticrit  -daily cbc      ESRD   -on dialysis MWF  -BMP: Na 138 K 4.7 BUN 51 Cr 8.03 GFR 5 Mg 2.5 Phos 6.6  -last dialyzed on 10/11     Plan:   -dialysis per nephro     Hx ureteral stricture s/p stent  -urology consulted   -renal ultrasound 10/9: showed atrophic kidneys compatible with CKD, no hydronephrosis, stent not clearly visualized      Plan:  -no intervention indicated at this time       DM   -not on any meds for DM at home   -10/8 A1c 4.6  -BS btw  on this admission     Plan:   -POC glucose as needed   -daily BMP     GERD  -continue famotidine      Chronic Back Pain 2/2 DDD  Pt reports her back pain ongoing and baseline for her. Home regimenL gabapentin 200mg after dialysis prn. Hydromorphone 2mg prn.  -hold hydromorphone in setting of low Bps  -gabapentin 200mg tid  -tylenol prn  -lidocaine patch     Depression  Home regimen - cymbalta 20mg. Per manufacture should avoid use in hemodialysis pts.  If not helping with mood suggest changing to medication   -consideration starting sertraline rather then cymbalta due to esrd      Diet regular   DVT Prophylaxis sqh   GI Prophylaxis pepcid   Code status full   Disposition TBD     Point of Contact lul  Relationship: son  [phone]      Karina Osorio MD , PGY-2   [facility] Medicine   October 12, 2021, 6:10 AM

## 2021-10-12 NOTE — ROUTINE PROCESS
TRANSFER - IN REPORT:    Verbal report received from [facility] RN(name) on [place]  being received from ED(unit) for routine progression of care      Report consisted of patients Situation, Background, Assessment and   Recommendations(SBAR). Information from the following report(s) SBAR, Kardex and MAR was reviewed with the receiving nurse. Opportunity for questions and clarification was provided. Assessment completed upon patients arrival to unit and care assumed. [address] [facility], patient complaining of hand pain. 56 - Dr. April Izquierdo is placing an order for Tylenol; Dr. April Izquierdo will have the day team to follow up on the Gentamycin, not sure why it wasn't given prior to coming on the floor and why it shouldn't be given on the unit. Patient received dialysis yesterday. 3340 - Dr. April Izquierdo called, she spoke to pharmacy; pharmacy said okay to give Gentamycin. Dr. April Izquierdo is also placing an order to gabapentin now. Pharmacy will have to relabel the Gentamycin before it can be given. Gave bedside report to Pearl Weeks RN, and Lidya RN, report included SBAR, MAR, and Kardex.

## 2021-10-12 NOTE — DIALYSIS
ACUTE HEMODIALYSIS FLOW SHEET    HEMODIALYSIS ORDERS: Physician: Dr. Leyva Rule: Revaclear   Duration:  3 hr  BFR: 300   DFR: 600   Dialysate:  Temp 36   K+   2    Ca+  2.5   Na 138   Bicarb 30   Wt Readings from Last 1 Encounters:   10/10/21 93.9 kg (207 lb)   [x] Patient Chart     [] Unable to Obtain     Dry weight/UF Goal: 500 ml               Access:  LUE AVF     Heparin []  Bolus    Units    [] Hourly    Units    [x] None      Catheter locking solution:  n/a   Pre BP:  98.47    Pulse:  78   Respirations: 19    Temperature:  98.0 oral    Tx: NSS   ml/Bolus   [x] N/A   Labs: []  Pre  []  Post   [x] N/A   Additional Orders(medications, blood products, hypotension management): [] Yes   [x] No     [x]  [facility] Consent Verified       GRAFT/FISTULA ACCESS:   []N/A     []Right     [x]Left     [x]UE     []LE   []AVG   [x]AVF     []Buttonhole    [x]Medical Aseptic Prep Utilized   [x]No S/S infection  []Redness  []Drainage []Cultured  [] Swelling  [] Pain  Bruit:   [x] Strong    [] Weak       Thrill :   [x] Strong    [] Weak     Needle Gauge: 15   Length: 1 inch   If access problem,  notified: []Yes     [x]N/A     Please describe access if present and not used: N/A       GENERAL ASSESSMENT:    LOC:    [x] Alert   [x]Oriented:  [x] Person  [x] Place  [x]Time              [] Confused  [] Non-Verbal [] Lethargic  [] Obtunded                [] Sedated   [] Agitated  [] Restless    []  Non-responsive        CARDIAC: [x]Regular PAC      [] Irregular   [] Pericardial Rub  [] JVD          [x]  Monitored  [x] Bedside  [] Remotely monitored       LUNGS:   SaO2  100%   [x] Clear  [] Coarse  [] Crackles  [] Wheezing                                        [x] Diminished     Location : []RLL   []LLL    [x]RUL  [x]LALI   Cough: []Productive  []Dry  [x]N/A   Respirations:  [x]Easy  []Labored   Therapy:  [x]RA  []NC L/min    Mask: []NRB  [] Venti    O2%                  []Ventilator  []Intubated  [] Trach  [] BiPaP     GI / ABDOMEN:                     [] Flat    [] Distended    [x] Soft    [] Firm   []  Obese                   [] Diarrhea  [x] Bowel Sounds  [] Nausea  [] Vomiting                   [] Fecal Management System  [] Incontinent of stool      / URINE ASSESSMENT:                   [] Voiding   [x] Oliguria  [] Anuria   []  Cline                  [] Incontinent of urine     SKIN:   [x] Warm  [] Hot  [] Cold   [] Cool   [x] Dry    [] Pale   [] Diaphoretic                  [] Flushed  [] Jaundiced  [] Cyanotic  [] Rash  [] Weeping     EDEMA: [x] None  []Generalized  [] Pitting [] 1    [] 2    [] 3    [] 4                 [] Facial  [] Pedal  []  UE  [] LE     MOBILITY:  [x] Bed    [] Stretcher     PAIN:  [x] 0 []1  []2   []3   []4   []5   []6   []7   []8   []9   []10            Scale 0-10  Action/Follow Up:        All Vitals and Treatment Details on Attached [facility]: [facility]          Room # ER02/02   [] 1st Time Acute      [] Stat       [x] Routine      [] Urgent     [] Acute Room  []  Bedside  [] ICU/CCU  [x] ER   Isolation Precautions:  [x] Dialysis    There are currently no Active Isolations       ALLERGIES:     Allergies   Allergen Reactions    Ciprofloxacin Hives    Cyclopentolate Unknown (comments)    Iron Sucrose Diarrhea    Midodrine Unknown (comments)     Denies 910/1/21    Statins-Hmg-Coa Reductase Inhibitors Other (comments)     Body ache      Code Status:  Full Code     Hepatitis Status     No results found for: HAMAT, HAAB, HABT, HAAT, HBSAG, HBSB, HBSAT, HBABN, HBCM, HBCAB, HBCAT, XBCABS, HBEAB, HBEAG, XHEPCS, [id number], HBEGLT, HBCMLT, HBCLT, HBEBLT, [id number], [id number], HAVMLT, [id number], HBCMLT, [id number], HCGAT     Current Labs:      Lab Results   Component Value Date/Time    WBC 8.0 10/11/2021 05:35 AM    Hemoglobin, POC 8.8 (L) 09/24/2020 08:45 AM    HGB 8.8 (L) 10/11/2021 05:35 AM    Hematocrit, POC 26 (L) 09/24/2020 08:45 AM    HCT 28.2 (L) 10/11/2021 05:35 AM    PLATELET 620 10/11/2021 05:35 AM    MCV 95.3 10/11/2021 05:35 AM     Lab Results   Component Value Date/Time    Sodium 138 10/11/2021 05:35 AM    Potassium 4.7 10/11/2021 05:35 AM    Chloride 101 10/11/2021 05:35 AM    CO2 29 10/11/2021 05:35 AM    Anion gap 8 10/11/2021 05:35 AM    Glucose 85 10/11/2021 05:35 AM    BUN 51 (H) 10/11/2021 05:35 AM    Creatinine 8.03 (H) 10/11/2021 05:35 AM    BUN/Creatinine ratio 6 (L) 10/11/2021 05:35 AM    GFR est AA 6 (L) 10/11/2021 05:35 AM    GFR est non-AA 5 (L) 10/11/2021 05:35 AM    Calcium 8.5 10/11/2021 05:35 AM          DIET:  DIET ADULT      PRIMARY NURSE REPORT:   Pre Dialysis:  NISHI Roblero     Time: 18:45      EDUCATION:    [x] Patient [] Other           Knowledge Basis: []None [x]Minimal [] Substantial [] Unable to assess at this time.    Barriers to learning  [x]N/A   [] Access Care     [] S&S of infection  [] Fluid Management  [] K+   [x] Procedural    []Albumin   [] Medications   [] Tx Options   [] Transplant   [] Diet   [] Other   Teaching Tools:  [x] Explain  [] Demo  [] Handouts [] Video  Patient response: [x] Verbalized understanding  [] Teach back  [] Return demonstration   [] Requires follow up      [x]Time Out/Safety Check  [x] Extracorporeal Circuit Tested for integrity       [address] - Before each treatment:     Machine Number:                   [address] [facility]                                    [x] Portable Machine #10/RO serial # [id number]                                                                            Alarm Test:  Pass time 18:41            [x] RO/Machine Log Complete    Machine Temp    36.0             Dialysate: pH  7.4    Conductivity: Meter 13.8     HD Machine  14.0      TCD: 13.8  Dialyzer Lot # [id number]     Blood Tubing Lot # [id number]    Saline Lot # [id number]     CHLORINE TESTING-Before each treatment and every 4 hours    Total Chlorine: [x] less than 0.1 ppm  Initial Time Check: 19:15       4 Hr/2nd Check Time:    (if greater than 0.1 ppm from Primary then every 30 minutes from Secondary)     TREATMENT INITIATION - with Dialysis Precautions:   [x] All Connections Secured              [x] Saline Line Double Clamped   [x] Venous Parameters Set               [x] Arterial Parameters Set    [x] Prime Given 250ml NSS              [x]Air Foam Detector Engaged      Treatment Initiation Note:  19:01  Arrived to ER, pt in stretcher, A,A, & Ox3, time out done with pt. During Treatment Notes:  19:27  LUE AVF assessed no abnormalities noted, bruit and thrill strong. AVF accessed without any difficulty, pt tolerated well. Vascular access visible with arterial and venous line connections intact. 19:30  Vascular access visible with arterial and venous line connections intact. 19:45  VSS, vascular access visible with arterial and venous line connections intact. 20:00  Vascular access visible with arterial and venous line connections intact. 20:15  VSS, vascular access visible with arterial and venous line connections intact. 20:30  Vascular access visible with arterial and venous line connections intact. 20:45  VSS, vascular access visible with arterial and venous line connections intact. 21:00  Vascular access visible with arterial and venous line connections intact. 21:15  VSS, vascular access visible with arterial and venous line connections intact. 21:30  Asked primary RN for Vanco.  Vascular access visible with arterial and venous line connections intact. 21:45  VSS, vascular access visible with arterial and venous line connections intact. 22:00  Vascular access visible with arterial and venous line connections intact. 22:15  VSS, vascular access visible with arterial and venous line connections intact.      Medication Dose Volume Route Time Dat Nurse, Title   None          Post Assessment  Dialyzer Cleared:[] Good [x] Fair  [] Poor  Blood processed:  50.9 L  Net UF Removed:  500 Ml  Post BP 95/36  Pulse  92 Resp  24 Temp 97.9 oral    Post Tx Vascular Access:   AVF: Bleeding stopped with  Arterial Pressure for 10 min   Venous Pressure for 8 min            Skin:[x] Warm  [x] Dry [] Diaphoretic             []Cool     [] Flushed  [] Pale            [] Cyanotic   Pain:  [x]0  []1 []2  []3 []4  []5  []6 []7 []8  []9  []10     Post Treatment Note:   23:14  HD completed at this time, pt tolerated well. Dressing clean, dry and intact. POST TREATMENT PRIMARY NURSE HANDOFF REPORT:   Post Dialysis:  JABIER Lujan               Time:  23:20     Abbreviations: AVG-arterial venous graft, AVF-arterial venous fistula, IJ-Internal Jugular, Subcl-Subclavian, Fem-Femoral, Tx-treatment, AP/HR-apical heart rate, DFR-dialysate flow rate, BFR-blood flow rate, AP-arterial pressure, -venous pressure, UF-ultrafiltrate, TMP-transmembrane pressure, [place]-Venous, Art-Arterial, RO-Reverse Osmosis

## 2021-10-12 NOTE — PROGRESS NOTES
Urology Progress Note      I have reviewed pertinent vitals, I/O's, notes, laboratory values and imaging. I have discussed the case and I agree with the provider's assessment and plan as outlined above, with ammendments as follows:        Laury Gaucher, MD  [facility] [place] [place]  Pager [phone]    Assessment/Plan:     Assessment:   Ureteral stricture s/p stent exchange 10/5 with Dr. Rula Yeung  UTI, urosepsis   Ucx 10/8 >100K yeast, probably enterococcus species, 10/5 NG   Normal WBC 8.0   Afebrile, intermittent hypotension     ESRD- MWF  DM2    Presented to the ED 10/8 for hypotension and dizziness following HD    Plan:   Intermittent hypotension, has been off Levophed since yesterday   US shows no hydronephrosis  Ucx shows yeast, probably enterococcus species, completed gent, on vanc   Single Blood cx positive for GPC, possibly contaminated vs bloodstream infection, ID following   HD per nephrology  Following peripherally       Follow up arranged? Pending clinical course     Scarlett Velasco, GINO-BC  [facility] [place] [place]   Pager [phone]      Subjective:     Daily Progress Note: 10/12/2021 10:37 AM    Redd Marie is doing well, denies any pain or new symptoms, still has bladder spasms but feels better  Oliguria, HD yesterday       Objective:     Visit Vitals  BP (!) 99/50 (BP 1 Location: Right upper arm, BP Patient Position: At rest)   Pulse 94   Temp 98.4 °F (36.9 °C)   Resp 18   Ht 5' 2\" (1.575 m)   Wt 93.9 kg (207 lb)   SpO2 97%   BMI 37.86 kg/m²        Temp (24hrs), Av.1 °F (36.7 °C), Min:97.9 °F (36.6 °C), Max:98.4 °F (36.9 °C)      Intake and Output:  10/10 1901 - 10/12 0700  In: -   Out: 500   No intake/output data recorded.     PHYSICAL EXAMINATION:   Visit Vitals  BP (!) 99/50 (BP 1 Location: Right upper arm, BP Patient Position: At rest)   Pulse 94   Temp 98.4 °F (36.9 °C)   Resp 18   Ht 5' 2\" (1.575 m)   Wt 93.9 kg (207 lb)   SpO2 97%   BMI 37.86 kg/m²     Constitutional: Well developed, well-nourished female in no acute distress. HEENT: Normocephalic, Atraumatic   CV:   no edema  Respiratory: No respiratory distress or difficulties breathing   Abdomen:  Soft and nontender.  Female:  CVA tenderness: none  Skin:  Normal color. No evidence of jaundice. Neuro/Psych:  Patient with appropriate affect. Alert and oriented. Lab/Data Review: All lab results for the last 24 hours reviewed. US 10/9  FINDINGS:     Right Kidney: The right kidney measures 5.4 x 3.3 x 2.5 cm in size with  significant cortical thinning. The echotexture is increased. No  hydronephrosis. No renal calculi evident. No contour deforming mass. Left Kidney: The left kidney measures 5.7 x 2.8 x 2.4 cm in size. The  echotexture is  increased. No hydronephrosis. .  No renal calculi evident. 1.2 cm cystic-appearing lesion medially. 1.6 cm cyst in the upper pole. 1.5 cm  cyst in the interpolar kidney. Bladder: The bladder is not well distended measuring 3.2 x 4.7 x 7.2 cm (volume  75.2 mL). IMPRESSION     Atrophic kidneys compatible with chronic kidney disease. No hydronephrosis. Stent not clearly visualized.     Labs:     Labs: Results:   Chemistry    Recent Labs     10/11/21  0535 10/10/21  0511   GLU 85 101*    138   K 4.7 5.3    98*   CO2 29 31   BUN 51* 37*   CREA 8.03* 6.25*   CA 8.5 8.6   AGAP 8 9   BUCR 6* 6*      CBC w/Diff Recent Labs     10/11/21  0535 10/10/21  0937   WBC 8.0 8.0   RBC 2.96* 3.11*   HGB 8.8* 9.2*   HCT 28.2* 29.8*    254   GRANS 58 69   LYMPH 27 18*   EOS 2 2      Cultures Recent Labs     10/10/21  1631   CULT NO GROWTH 2 DAYS     All Micro Results       Procedure Component Value Units Date/Time    CULTURE, BLOOD [[id number]] Collected: 10/10/21 1631    Order Status: Completed Specimen: Blood Updated: 10/12/21 0646     Special Requests: NO SPECIAL REQUESTS        Culture result: NO GROWTH 2 DAYS       CULTURE, BLOOD [[id number]] Collected: 10/08/21 1550    Order Status: Completed Specimen: Blood Updated: 10/12/21 0646     Special Requests: NO SPECIAL REQUESTS        Culture result: NO GROWTH 4 DAYS       CULTURE, BLOOD [[id number]]  (Abnormal) Collected: 10/08/21 1535    Order Status: Completed Specimen: Blood Updated: 10/11/21 0638     Special Requests: NO SPECIAL REQUESTS        GRAM STAIN       AEROBIC BOTTLE GRAM POSITIVE COCCI IN GROUPS                  SMEAR CALLED TO AND CORRECTLY REPEATED BY: NOVA [facility] ER ON 09OCT21 AT 2202 HRS TO 1396. Culture result:       STAPHYLOCOCCUS SPECIES, COAGULASE NEGATIVE GROWING IN 1 OF 2 BOTTLES DRAWN (SITE = L AC)                  PRELIMINARY REPORT OF  GPC IN CLUSTERS  CALLED TO AND READ BACK BY  [facility] ON [id number] AT 2202 TO 1396.        CULTURE, URINE [[id number]] Collected: 10/08/21 1915    Order Status: Completed Specimen: Urine from Clean catch Updated: 10/10/21 1234    CULTURE, URINE [[id number]] Collected: 10/09/21 1415    Order Status: Canceled Specimen: Cath Urine     RESPIRATORY VIRUS PANEL W/COVID-19, PCR [[id number]] Collected: 10/09/21 0707    Order Status: Completed Specimen: Nasopharyngeal Updated: 10/09/21 0825     Adenovirus Not detected        Coronavirus 229E Not detected        Coronavirus HKU1 Not detected        Coronavirus CVNL63 Not detected        Coronavirus OC43 Not detected        SARS-CoV-2, PCR Not detected        Metapneumovirus Not detected        Rhinovirus and Enterovirus Not detected        Influenza A Not detected        Influenza B Not detected        Parainfluenza 1 Not detected        Parainfluenza 2 Not detected        Parainfluenza 3 Not detected        Parainfluenza virus 4 Not detected        RSV by PCR Not detected        B. parapertussis, PCR Not detected        Bordetella pertussis - PCR Not detected        Chlamydophila pneumoniae DNA, QL, PCR Not detected        Mycoplasma pneumoniae DNA, QL, PCR Not detected                 Urinalysis Color Date Value Ref Range Status   10/08/2021 DARK YELLOW   Final     Appearance   Date Value Ref Range Status   10/08/2021 TURBID   Final     Specific gravity   Date Value Ref Range Status   10/08/2021 1.020 1.005 - 1.030   Final     pH (UA)   Date Value Ref Range Status   10/08/2021 8.0 5.0 - 8.0   Final     Protein   Date Value Ref Range Status   10/08/2021 >1,000 (A) NEG mg/dL Final     Ketone   Date Value Ref Range Status   10/08/2021 Negative NEG mg/dL Final     Bilirubin   Date Value Ref Range Status   10/08/2021 Negative NEG   Final     Blood   Date Value Ref Range Status   10/08/2021 LARGE (A) NEG   Final     Urobilinogen   Date Value Ref Range Status   10/08/2021 1.0 0.2 - 1.0 EU/dL Final     Nitrites   Date Value Ref Range Status   10/08/2021 Negative NEG   Final     Leukocyte Esterase   Date Value Ref Range Status   10/08/2021 LARGE (A) NEG   Final     Potassium   Date Value Ref Range Status   10/11/2021 4.7 3.5 - 5.5 mmol/L Final     Creatinine   Date Value Ref Range Status   10/11/2021 8.03 (H) 0.6 - 1.3 MG/DL Final     BUN   Date Value Ref Range Status   10/11/2021 51 (H) 7.0 - 18 MG/DL Final      PSA No results for input(s): PSA in the last 72 hours.    Coagulation Lab Results   Component Value Date/Time    Prothrombin time 13.6 10/08/2021 03:50 PM    Prothrombin time 14.2 10/14/2020 11:56 AM    INR 1.1 10/08/2021 03:50 PM    INR 1.1 10/14/2020 11:56 AM    aPTT 35.4 10/14/2020 11:56 AM    aPTT 46.9 (H) 07/16/2020 04:52 PM         Patient Active Problem List   Diagnosis Code    Nausea & vomiting R11.2    Pyelonephritis S30    Complicated urinary tract infection N39.0    Anemia of chronic renal failure N18.9, D63.1    Anemia D64.9    Hypotension I95.9    UTI (urinary tract infection) N39.0    Type 2 diabetes mellitus, without long-term current use of insulin ([facility]) E11.9    CKD (chronic kidney disease) stage 5, GFR less than 15 ml/min (HCC) N18.5    Acquired hypothyroidism E03.9    GERD (gastroesophageal reflux disease) M68.3    Complicated UTI (urinary tract infection) N39.0    Disease of the lower urinary tract N39.9    Sepsis (HCC) A41.9    Tachycardia R00.0    ESRD (end stage renal disease) on dialysis ([facility]) N18.6, Z99.2    Hydronephrosis N13.30    Septic shock ([facility]) A41.9, R65.21

## 2021-10-12 NOTE — PROGRESS NOTES
Problem: Mobility Impaired (Adult and Pediatric)  Goal: *Acute Goals and Plan of Care (Insert Text)  Description: Physical Therapy Goals  Initiated 10/12/2021 and to be accomplished within 7 day(s)  1. Patient will move from supine to sit and sit to supine  in bed with modified independence. 2.  Patient will transfer from bed to chair and chair to bed with modified independence using the least restrictive device. 3.  Patient will perform sit to stand with modified independence. 4.  Patient will ambulate with modified independence for 200 feet with the least restrictive device. 5.  Patient will ascend/descend 3 stairs with B handrail(s) with modified independence. PLOF: Pt lives with her son and daughter in law in a 2-story home, 3 [address], chair lift to 2nd floor. She reports being Reyna with RW and amb short distances. Outcome: Progressing Towards Goal     PHYSICAL THERAPY EVALUATION    Patient: Ning Hurtado [de-identified]64 y.o. female)  Date: 10/12/2021  Primary Diagnosis: Septic shock ([facility] [address] [A41.9, R65.21]        Precautions:  Fall    ASSESSMENT :  Based on the objective data described below, the patient presents with generalized weakness, decreased endurance, and dizziness. Pt found semi-reclined in bed, in NAD, wiling to work with PT. She reports no pain. BP taken supine- 78/43. Rn in room at this time, pt performed sup-sit with supervision. BP improved to 152/134. HR fluctuating, highest 180 bpm but pt without any symptoms. Further standing/amb deferred this date. HR improving but still fluctuating 1 minute later, RN in room preparing medications. On objective eval, pt presents with grossly 4/5 strength. She requested to stay sitting up on EOB. Pt left with call bell and all needs met, RN at bedside. Patient will benefit from skilled intervention to address the above impairments.   Patient's rehabilitation potential is considered to be Fair  Factors which may influence rehabilitation potential include:   [] None noted  []         Mental ability/status  [x]         Medical condition  []         Home/family situation and support systems  []         Safety awareness  []         Pain tolerance/management  []         Other:      PLAN :  Recommendations and Planned Interventions:   [x]           Bed Mobility Training             [x]    Neuromuscular Re-Education  [x]           Transfer Training                   []    Orthotic/Prosthetic Training  [x]           Gait Training                          []    Modalities  [x]           Therapeutic Exercises           []    Edema Management/Control  [x]           Therapeutic Activities            [x]    Family Training/Education  [x]           Patient Education  []           Other (comment):    Frequency/Duration: Patient will be followed by physical therapy 1-2 times per day/4-7 days per week to address goals. Discharge Recommendations: Home Health with continued assist from family when needed  Further Equipment Recommendations for Discharge: N/A     SUBJECTIVE:   Patient stated this happens when I go to dialysis.     OBJECTIVE DATA SUMMARY:     Past Medical History:   Diagnosis Date    Acidosis     Anemia     Arteriovenous fistula (HCC)     Chronic kidney disease     on HD at [facility] on [address] on MWF. [phone]    Chronic pain     CKD (chronic kidney disease)     Diabetes ([facility] [address]     no meds now    ESRD (end stage renal disease) on dialysis ([facility] [address] 9/9/2021    HLD (hyperlipidemia)     HTN (hypertension)     Hyperparathyroidism due to renal insufficiency (HCC)     Hypothyroid     Kidney stone     Lung mass     Recurrent UTI     Ureter, stricture     Uric acid nephrolithiasis     Urinary incontinence      Past Surgical History:   Procedure Laterality Date    HX APPENDECTOMY      HX CHOLECYSTECTOMY      HX GASTRIC BYPASS      Gastric stapling    HX KNEE ARTHROSCOPY      HX UROLOGICAL      right PCN placement    HX UROLOGICAL  07/23/2018    RIGHT URETEROSCOPY WITH HOLMIUM LASER    IR EXCHANGE NEPHRO PERC LT SI  2/21/2020    IR EXCHANGE NEPHRO PERC RT SI  4/13/2020    IR EXCHANGE NEPHRO PERC RT SI  7/17/2020    IR NEPHROSTOMY PERC RT PLC CATH  SI  10/14/2020    IR NEPHROURETERAL PERC RT PLC CATH NEW ACCESS  SI  4/30/2020    MS INTRO CATH DIALYSIS CIRCUIT DX ANGRPH FLUOR S&I Left 9/24/2020    FISTULOGRAM LEFT/poss permanent catheter placement performed by Gibran Parnell MD at [facility] CATH LAB    VASCULAR SURGERY PROCEDURE UNLIST      lef AVF     Barriers to Learning/Limitations: None  Compensate with: N/A  Home Situation:  Home Situation  Home Environment: Private residence  # Steps to Enter: 3  One/Two Story Residence: Two story  Lift Chair Available: Yes  Living Alone: No  Support Systems: Child(isaura)  Patient Expects to be Discharged to[de-identified] House  Current DME Used/Available at Home: Walker, rolling  Critical Behavior:  Neurologic State: Alert  Orientation Level: Oriented X4  Cognition: Follows commands  Safety/Judgement: Fall prevention  Psychosocial  Patient Behaviors: Calm; Cooperative  Purposeful Interaction: Yes  Pt Identified Daily Priority: Clinical issues (comment)  Caritas Process: Nurture loving kindness;Nurture spiritual self;Teaching/learning  Caring Interventions: Therapeutic modalities  Therapeutic Modalities: Intentional therapeutic touch    Strength:    Strength: Generally decreased, functional    Tone & Sensation:   Tone: Normal    Sensation: Intact    Range Of Motion:  AROM: Within functional limits    Functional Mobility:  Bed Mobility:     Supine to Sit: Supervision    Balance:   Sitting: Intact; With support  Standing:  (no standing 2/2 high HR)    Pain:  Pain level pre-treatment: 0/10   Pain level post-treatment: 0/10   Pain Intervention(s) : Medication (see MAR);  Rest, Ice, Repositioning  Response to intervention: Nurse notified, See doc flow    Activity Tolerance:   Pt tolerated mobility well  Please refer to the flowsheet for vital signs taken during this treatment. After treatment:   []         Patient left in no apparent distress sitting up in chair  [x]         Patient left in no apparent distress in bed  [x]         Call bell left within reach  [x]         Nursing notified  []         Caregiver present  []         Bed alarm activated  []         SCDs applied    COMMUNICATION/EDUCATION:   [x]         Role of Physical Therapy in the acute care setting. [x]         Fall prevention education was provided and the patient/caregiver indicated understanding. [x]         Patient/family have participated as able in goal setting and plan of care. []         Patient/family agree to work toward stated goals and plan of care. []         Patient understands intent and goals of therapy, but is neutral about his/her participation. []         Patient is unable to participate in goal setting/plan of care: ongoing with therapy staff.  []         Other:     Thank you for this referral.  Hector Sarah   Time Calculation: 16 mins      Eval Complexity: History: LOW Complexity : Zero comorbidities / personal factors that will impact the outcome / POCExam:LOW Complexity : 1-2 Standardized tests and measures addressing body structure, function, activity limitation and / or participation in recreation  Presentation: LOW Complexity : Stable, uncomplicated  Clinical Decision Making:Low Complexity    Overall Complexity:LOW Yes

## 2021-10-12 NOTE — PROGRESS NOTES
RENAL DAILY PROGRESS NOTE    Patient: [facility]               Sex: female          DOA: 10/8/2021  2:50 PM        YOB: 1945      Age:  64 y.o.        LOS:  LOS: 4 days     Subjective:     [facility] is a 64 y.o.  who presents with Septic shock ([facility] [address] [A41.9, R65.21].    Asked to evaluate for esrd,admitted with hypotension,hx of reccurent uti,s/p ureteral stent exchange few days ago  Chief complains: Patient denies nausea, vomiting, chest pain, dizziness, shortness of breath or headache.  - Reviewed last 24 hrs events     Current Facility-Administered Medications   Medication Dose Route Frequency    lidocaine 4 % patch 1 Patch  1 Patch TransDERmal Q24H    [START ON 10/13/2021] gabapentin (NEURONTIN) capsule 200 mg  200 mg Oral 3 times weekly    acetaminophen (TYLENOL) tablet 650 mg  650 mg Oral Q6H PRN    levothyroxine (SYNTHROID) tablet 125 mcg  125 mcg Oral 6am    latanoprost (XALATAN) 0.005 % ophthalmic solution 1 Drop  1 Drop Both Eyes QPM    [START ON 10/13/2021] Vancomycin Lab Information: Random Level Due  1 Each Other ONCE    [START ON 10/13/2021] Gentamicin Lab Information: Random Level Due  1 Each Other ONCE    fludrocortisone (FLORINEF) tablet 0.2 mg  0.2 mg Oral DAILY    midodrine (PROAMATINE) tablet 10 mg  10 mg Oral TID WITH MEALS    epoetin caitlin-epbx (RETACRIT) injection 8,000 Units  8,000 Units SubCUTAneous Q MON, WED & FRI    famotidine (PF) (PEPCID) 20 mg in 0.9% sodium chloride 10 mL injection  20 mg IntraVENous Q24H    GENTAMICIN INFORMATION NOTE   Other Rx Dosing/Monitoring    VANCOMYCIN INFORMATION NOTE   Other Rx Dosing/Monitoring    diphenhydrAMINE-zinc acetate 2%-0.1% (BENADRYL) cream   Topical TID PRN    glucose chewable tablet 16 g  4 Tablet Oral PRN    glucagon (GLUCAGEN) injection 1 mg  1 mg IntraMUSCular PRN    dextrose (D50W) injection syrg 12.5-25 g  25-50 mL IntraVENous PRN    heparin (porcine) injection 5,000 Units  5,000 Units SubCUTAneous Q8H       Objective:     Visit Vitals  BP (!) 99/50 (BP 1 Location: Right upper arm, BP Patient Position: At rest)   Pulse 94   Temp 98.4 °F (36.9 °C)   Resp 18   Ht 5' 2\" (1.575 m)   Wt 93.9 kg (207 lb)   SpO2 97%   BMI 37.86 kg/m²       Intake/Output Summary (Last 24 hours) at 10/12/2021 1100  Last data filed at 10/11/2021 2245  Gross per 24 hour   Intake --   Output 500 ml   Net -500 ml       Physical Examination:     GEN: AAO X 3,  RS: Chest is bilateral equal, no wheezing / rales / crackles  CVS: S1-S2 heard,   Abdomen: Soft, Non tender,   Extremities: No edema,   CNS: Awake & follows commands,   HEENT: Head is atraumatic, PERRLA, conjunctiva pink & non icteric. No JVD or carotid bruit      Data Review:      Labs:     Hematology:   Recent Labs     10/11/21  0535 10/10/21  0937   WBC 8.0 8.0   HGB 8.8* 9.2*   HCT 28.2* 29.8*     Chemistry:   Recent Labs     10/11/21  0535 10/10/21  0511   BUN 51* 37*   CREA 8.03* 6.25*   CA 8.5 8.6   K 4.7 5.3    138    98*   CO2 29 31   PHOS 6.6* 6.2*   GLU 85 101*        Images:    XR (Most Recent). CXR reviewed by me and compared with previous CXR Results from Hospital Encounter encounter on 10/08/21    XR CHEST PORT    Narrative  Portable Chest    CPT CODE: 96410    HISTORY: Line placements. FINDINGS:    Compared with study done earlier the same day at 1521 hours. Right internal jugular line tip at the proximal SVC. Additional wires overlie  the patient. Surgical clips at the epigastrium. Ectasia of the descending  thoracic aorta elevated diaphragm on the right stable. No pneumothorax, effusion  or new lung consolidation. Impression  Right CVL tip at the proximal SVC. No pneumothorax. CT (Most Recent) Results from Hospital Encounter encounter on 10/08/21    CT HEAD WO CONT    Narrative  CT Head without contrast    HISTORY: Hypotension, dizziness.     COMPARISON: None    TECHNIQUE:  Axial imaging from the skull base to the vertex was performed  without intravenous contrast.  Coronal and sagittal reformations. All CT scans  are performed using dose optimization techniques as appropriate to the performed  exam including the following: Automated exposure control, adjustment of mA  and/or kV according to patient size, and use of iterative reconstructive  technique. FINDINGS:    No intracranial hemorrhage. No evidence of midline shift, mass effect, or  obvious mass lesion. There is preservation of the gray and white matter  differentiation, no acute infarct (Please note that CT is less sensitive in the  detection of early infarct). Moderately severe periventricular white matter changes of the cerebrum. The size of the ventricles and sulci are normal.  No extra axial fluid  collections. Heavily calcified intracranial carotid arteries. Visualized paranasal sinuses are clear. No evidence for skull fracture. Impression  No CT finding for acute territorial infarct or acute intracranial hemorrhage. Fairly extensive periventricular white matter change. Nonspecific but often  associated with chronic microvascular ischemic disease. EKG No results found for this or any previous visit. I have personally reviewed the old medical records and patient's labs    Plan / Recommendation:      1. Esrd,on dialysis mon wed Friday,minimal uf with dialysis  2.hypotension on midodrine,heart rate was up to 130 when she tried to sit. may need cardiology input  3.anemia,give epo    D/w icu team    Amina Santos MD  Nephrology  10/12/2021

## 2021-10-12 NOTE — PROGRESS NOTES
[facility]  Pulmonary, Critical Care, and Sleep Medicine  Subsequent Encounter Note    Discussed patient with Dr. Ciara Munoz regarding septic shock superimposed on chronic hypotension. Patient off vasopressors since 0300. Stable for stepdown.       Discussed with Dr. Tre Jerez PA-C  10/11/21  Pulmonary, Critical Care Medicine  [facility]

## 2021-10-12 NOTE — ROUTINE PROCESS
1152 - Pt HR noted to have increased to 175 on telemetry monitor. Checked on pt to find pt sitting on the side of bed to eat. Asked pt to sit back in the bed to eat as her HR increases on exertion. HR decreased to 94.

## 2021-10-12 NOTE — H&P
Admission History and Physical  [facility] Medicine      Patient: Nicole Borges MRN: [id number]  [facility]: [id number]    YOB: 1945  Age: 64 y.o. Sex: female      Admission Date: 10/8/2021       HPI:     Nicole Borges is a 64 y.o. female with PMH of ESRD on HD, chronic hypotension on midodrine, ureteral stricture s/p stent by urology, recurrent UTIs, T2DM presented to the ED from dialysis center after having low BPs after an HD treatment with associated lightheadedness. Admitted for hypotension requiring pressors. Pt has been seen in PFM clinic 09/27, urine culture+ for pseudomonas aeruginosa, pan sensitive. Was treated with 7 days of ciprofloxacin. Pt's Bps soft at that time midodrine was increased to 2.5 TID from BID. Ureteral stricture s/p stent exchange 10/5 with Dr. Jesus Hdz. Pt reports on the day of admission she completed a full run of dialysis but was so weak and dizzy following dialysis she was unable to ambulate. In ED on 10/9, found to be hypotensive to the 70s, lactic acid mildly elevated, started on broadspectrum antibiotics given hx of recurrent UTIs. Admitted to ICU for hypotension in setting of possible septic shock requiring pressor support. Nephro, ID, urology consulted. ICU course as detailed below:     Hypotension: acute on chronic worsening of hypotension vs septic shock Initially started on levophed. Midodrine increased to 10mg TID, pt required levophed until 10/11 at 03:00. Bps have been low stable in 90s/60s since then. Septic Shock:  Afebrile, no leukocytosis, low procal. ID consulted. One BCx positive for GPC (? Contamination). Received vanc, cefepime, gentamicin initially. Per ID, c/w vanc and gentamicin pending urine and blood cx. Ucx and Repeat Bcx so far with no growth. Lactic acidosis - resolved   S/p ureteric stenting - Retroperitoneal US wo hydronephrosis. Urology following. No intervention per urology at this time.    ESRD - Dialysis per nephro (M,W,F). Last received 10/11. Tolerating well. T2D - diet controlled. Transferred to PF service 10/11. On exam in ED, pt feels well at this time. pt denies headache, chest pain, shortness of breath, abdominal pain, nausea, vomiting, urinary symptoms, fevers.        Review of Systems:  General ROS: chills, fever  Respiratory ROS: cough,shortness of breath   Cardiovascular ROS: chest pain  Gastrointestinal ROS: abdominal pain, constipation, diarrhea,nausea/vomiting  Genito-Urinary ROS: dysuria, hematuria or urinary frequency/urgency  Musculoskeletal ROS: back pain   Neurological ROS: headaches      Past Medical History:   Diagnosis Date    Acidosis     Anemia     Arteriovenous fistula (HCC)     Chronic kidney disease     on HD at [facility] on [address] on MWF. [phone]    Chronic pain     CKD (chronic kidney disease)     Diabetes ([facility] [address]     no meds now    ESRD (end stage renal disease) on dialysis ([facility] [address] 9/9/2021    HLD (hyperlipidemia)     HTN (hypertension)     Hyperparathyroidism due to renal insufficiency (HCC)     Hypothyroid     Kidney stone     Lung mass     Recurrent UTI     Ureter, stricture     Uric acid nephrolithiasis     Urinary incontinence        Past Surgical History:   Procedure Laterality Date    HX APPENDECTOMY      HX CHOLECYSTECTOMY      HX GASTRIC BYPASS      Gastric stapling    HX KNEE ARTHROSCOPY      HX UROLOGICAL      right PCN placement    HX UROLOGICAL  07/23/2018    RIGHT URETEROSCOPY WITH HOLMIUM LASER    IR EXCHANGE NEPHRO PERC LT SI  2/21/2020    IR EXCHANGE NEPHRO PERC RT SI  4/13/2020    IR EXCHANGE NEPHRO PERC RT SI  7/17/2020    IR NEPHROSTOMY PERC RT PLC CATH  SI  10/14/2020    IR NEPHROURETERAL PERC RT PLC CATH NEW ACCESS  SI  4/30/2020    ID INTRO CATH DIALYSIS CIRCUIT DX ANGRPH FLUOR S&I Left 9/24/2020    FISTULOGRAM LEFT/poss permanent catheter placement performed by Madeleine Garcia MD at [facility] CATH LAB    VASCULAR SURGERY PROCEDURE UNLIST      lef AVF       Family History   Problem Relation Age of Onset    Heart Surgery Sister        Social History     Socioeconomic History    Marital status: SINGLE     Spouse name: Not on file    Number of children: Not on file    Years of education: Not on file    Highest education level: Not on file   Tobacco Use    Smoking status: Never Smoker    Smokeless tobacco: Never Used   Vaping Use    Vaping Use: Never used   Substance and Sexual Activity    Alcohol use: Never    Drug use: Never     Social Determinants of Health     Financial Resource Strain:     Difficulty of Paying Living Expenses:    Food Insecurity:     Worried About Running Out of Food in the Last Year:     [address] in the Last Year:    Transportation Needs:     Lack of Transportation (Medical):  Lack of Transportation (Non-Medical):    Physical Activity:     Days of Exercise per Week:     Minutes of Exercise per Session:    Stress:     Feeling of Stress :    Social Connections:     Frequency of Communication with Friends and Family:     Frequency of Social Gatherings with Friends and Family:     Attends Jew Services:     Active Member of Clubs or Organizations:     Attends Club or Organization Meetings:     Marital Status:        Allergies   Allergen Reactions    Ciprofloxacin Hives    Cyclopentolate Unknown (comments)    Iron Sucrose Diarrhea    Midodrine Unknown (comments)     Denies 910/1/21    Statins-Hmg-Coa Reductase Inhibitors Other (comments)     Body ache       Prior to Admission Medications   Prescriptions Last Dose Informant Patient Reported? Taking? DULoxetine (Cymbalta) 20 mg capsule   Yes No   Sig: Take 20 mg by mouth daily. HYDROmorphone (DILAUDID) 2 mg tablet   Yes No   Sig: as needed. Narcan 4 mg/actuation nasal spray   Yes No   acetaminophen (TYLENOL) 325 mg tablet   Yes No   Sig: Take 650 mg by mouth two (2) times a day.    allopurinoL (ZYLOPRIM) 100 mg tablet   Yes No   Sig: Take 200 mg by mouth daily. ascorbic acid, vitamin C, (VITAMIN C) 500 mg tablet   Yes No   Sig: Take 500 mg by mouth daily. b complex vitamins (Vitamins B Complex) tablet   Yes No   Sig: Take 1 Tab by mouth daily. biotin 1,000 mcg chew   Yes No   Sig: Take 1 Tab by mouth daily. calcitRIOL (ROCALTROL) 0.25 mcg capsule   Yes No   Sig: Take 0.25 mcg by mouth daily. cholecalciferol (VITAMIN D3) (2,000 UNITS /50 MCG) cap capsule   Yes No   Sig: Take 2,000 Units by mouth two (2) times a day. Take two tabs a total of 4000 units   cyanocobalamin 1,000 mcg tablet   Yes No   Sig: Take 1,000 mcg by mouth daily. doxercalciferol (HECTOROL IV)   Yes No   Si mcg.   estradioL (Estrace) 0.01 % (0.1 mg/gram) vaginal cream   No No   Sig: Apply a fingertip amount around the urethra three times a week. fludrocortisone (FLORINEF) 0.1 mg tablet   No No   Sig: Take 1 Tab by mouth daily. Patient not taking: Reported on 10/1/2021   gabapentin (NEURONTIN) 100 mg capsule   Yes No   Sig: Take 100 mg by mouth nightly. AS needed   heparin sod,porcine/0.9 % NaCl (heparin flush, porcine, in ns) 100 unit/mL kit   Yes No   Sig: Heparin Sodium (Porcine) 1,000 Units/mL Systemic   lactobacillus sp. 50 billion cpu (BIO-K PLUS) 50 billion cell -375 mg cap capsule   No No   Sig: Take 1 Cap by mouth daily. latanoprost (XALATAN) 0.005 % ophthalmic solution   Yes No   Sig: Administer 1 Drop to both eyes nightly. One drop at bedtime   levothyroxine (SYNTHROID) 125 mcg tablet   Yes No   Sig: Take 125 mcg by mouth Daily (before breakfast). meclizine (ANTIVERT) 25 mg tablet   Yes No   Sig: Take 25 mg by mouth as needed. methoxy peg-epoetin beta (MIRCERA INJECTION)   Yes No   Si mcg.   midodrine (PROAMATINE) 2.5 mg tablet   Yes No   Sig: Take 1 Tablet by mouth three (3) times daily (with meals). omeprazole (PRILOSEC) 20 mg capsule   Yes No   Sig: Take 20 mg by mouth daily.    ondansetron (ZOFRAN ODT) 4 mg disintegrating tablet   Yes No   Sig: Take 4 mg by mouth every eight (8) hours as needed for Nausea or Vomiting.   sodium bicarbonate 650 mg tablet   No No   Sig: Take 2 Tabs by mouth three (3) times daily. Indications: excess body acid   Patient not taking: Reported on 10/1/2021   tamsulosin (FLOMAX) 0.4 mg capsule   No No   Sig: Take 1 Cap by mouth daily. vit B Cmplx 3-FA-Vit C-Biotin (NEPHRO HOWIE RX) 1- mg-mg-mcg tablet   Yes No   Sig: Take 1 Tab by mouth daily. vitamin B complex (B COMPLEX VITAMINS PO)   Yes No   Sig: Take 1 Tablet by mouth daily. vitamin D3-folic acid 0,224 unit- 1 mg tab   Yes No   Sig: Take 1 Tablet by mouth.       Facility-Administered Medications: None       Physical Exam:     Patient Vitals for the past 24 hrs:   Temp Pulse Resp BP SpO2   10/11/21 2314 97.9 °F (36.6 °C) 92 24 (!) 95/36 100 %   10/11/21 2245 -- 90 (!) 33 (!) 114/53 99 %   10/11/21 2230 -- 90 30 (!) 115/59 99 %   10/11/21 2215 -- 72 20 (!) 114/57 100 %   10/11/21 2200 -- 76 19 116/60 99 %   10/11/21 2145 -- 72 19 (!) 101/49 99 %   10/11/21 2130 -- 67 16 (!) 102/50 100 %   10/11/21 2115 -- 69 16 98/69 99 %   10/11/21 2100 -- 71 19 (!) 93/26 92 %   10/11/21 2045 -- 65 17 (!) 113/43 100 %   10/11/21 2030 -- 67 21 (!) 110/51 100 %   10/11/21 2015 -- 74 17 (!) 117/49 96 %   10/11/21 2000 -- 67 15 (!) 121/46 98 %   10/11/21 1945 -- 69 15 (!) 120/52 100 %   10/11/21 1930 -- 69 18 (!) 104/41 100 %   10/11/21 1927 -- 72 21 (!) 106/45 97 %   10/11/21 1901 98 °F (36.7 °C) 78 19 (!) 98/47 100 %   10/11/21 1500 -- 70 19 (!) 128/97 98 %   10/11/21 1445 -- 71 20 -- 93 %   10/11/21 1430 -- 72 18 (!) 99/59 98 %   10/11/21 1400 -- 78 24 109/71 98 %   10/11/21 1300 -- 84 20 138/60 (!) 64 %   10/11/21 1200 -- 74 19 112/63 96 %   10/11/21 0830 -- 75 13 (!) 105/38 99 %   10/11/21 0815 -- 76 (!) 41 (!) 117/55 98 %   10/11/21 0730 -- 84 17 (!) 88/48 99 %   10/11/21 0715 -- 81 18 (!) 108/48 95 %   10/11/21 0700 -- (!) 104 (!) 33 110/66 92 %   10/11/21 0645 -- 83 23 (!) 102/59 100 %   10/11/21 0630 -- 69 11 99/76 97 %   10/11/21 0615 -- 71 24 (!) 103/51 98 %   10/11/21 0600 -- 72 16 102/86 100 %   10/11/21 0545 -- 73 19 103/70 99 %   10/11/21 0530 -- 70 14 120/65 100 %   10/11/21 0515 -- 78 22 (!) 100/51 100 %   10/11/21 0500 -- 65 16 132/62 98 %   10/11/21 0445 -- 71 20 (!) 105/50 99 %   10/11/21 0430 -- 70 20 (!) 93/28 97 %   10/11/21 0415 -- 71 16 (!) 85/31 94 %   10/11/21 0400 -- 78 17 (!) 99/27 93 %   10/11/21 0345 -- 74 20 (!) 95/38 95 %   10/11/21 0330 -- 74 18 (!) 91/42 93 %   10/11/21 0315 -- 65 16 (!) 101/55 97 %   10/11/21 0300 -- 77 (!) 34 (!) 74/61 100 %   10/11/21 0245 -- 68 24 (!) 94/40 97 %   10/11/21 0045 -- 66 22 (!) 103/52 100 %   10/11/21 0030 -- 65 20 (!) 110/42 99 %   10/11/21 0015 -- 63 -- (!) 134/51 100 %   10/11/21 0000 -- 70 19 (!) 119/50 100 %   10/10/21 2345 -- 69 29 (!) 119/54 99 %   10/10/21 2330 -- -- (!) 45 (!) 124/58 99 %       Physical Exam:   General:  AAOx3, NAD   CV:  RRR   RESP:  Unlabored breathing. Lungs clear to auscultation. ABD:  Soft, nontender, nondistended. MS:  No joint deformity. Neuro:  CN II-XII grossly intact. Ext:  No edema.          Chemistry Recent Labs     10/11/21  0535 10/10/21  0511 10/09/21  0455   GLU 85 101* 142*    138 137   K 4.7 5.3 4.4    98* 97*   CO2 29 31 30   BUN 51* 37* 20*   CREA 8.03* 6.25* 4.51*   CA 8.5 8.6 8.7   MG 2.5 2.4 2.3   PHOS 6.6* 6.2* 5.4*   AGAP 8 9 10   BUCR 6* 6* 4*        CBC w/Diff Recent Labs     10/11/21  0535 10/10/21  0937 10/09/21  0455   WBC 8.0 8.0 10.9   RBC 2.96* 3.11* 3.51*   HGB 8.8* 9.2* 10.5*   HCT 28.2* 29.8* 33.9*    254 281   GRANS 58 69 60   LYMPH 27 18* 26   EOS 2 2 1        Liver Enzymes Protein, total   Date Value Ref Range Status   10/08/2021 7.9 6.4 - 8.2 g/dL Final     Albumin   Date Value Ref Range Status   10/08/2021 3.1 (L) 3.4 - 5.0 g/dL Final     Globulin   Date Value Ref Range Status   10/08/2021 4.8 (H) 2.0 - 4.0 g/dL Final     A-G Ratio Date Value Ref Range Status   10/08/2021 0.6 (L) 0.8 - 1.7   Final     Alk. phosphatase   Date Value Ref Range Status   10/08/2021 139 (H) 45 - 117 U/L Final     No results for input(s): TP, ALB, GLOB, AGRAT, AP, TBIL in the last 72 hours. No lab exists for component: SGOT, GPT, DBIL     Lactic Acid Lactic acid   Date Value Ref Range Status   05/15/2020 1.1 0.4 - 2.0 MMOL/L Final     No results for input(s): LAC in the last 72 hours. BNP No results found for: BNP, BNPP, XBNPT     Cardiac Enzymes No results found for: CPK, CK, CKMMB, CKMB, RCK3, CKMBT, CKNDX, CKND1, PATTI, TROPT, TROIQ, GRAHAM, TROPT, TNIPOC, BNP, BNPP     Coagulation No results for input(s): PTP, INR, APTT, INREXT in the last 72 hours. Thyroid  Lab Results   Component Value Date/Time    TSH 2.50 10/08/2021 03:50 PM          Lipid Panel No results found for: CHOL, CHOLPOCT, CHOLX, CHLST, CHOLV, 914480, HDL, HDLP, LDL, LDLC, DLDLP, 403416, VLDLC, VLDL, TGLX, TRIGL, TRIGP, TGLPOCT, CHHD, CHHDX     ABG No results for input(s): PHI, PHI, POC2, PCO2I, PO2, PO2I, HCO3, HCO3I, FIO2, FIO2I in the last 72 hours. Urinalysis Lab Results   Component Value Date/Time    Color DARK YELLOW 10/08/2021 07:15 PM    Appearance TURBID 10/08/2021 07:15 PM    Specific gravity 1.020 10/08/2021 07:15 PM    pH (UA) 8.0 10/08/2021 07:15 PM    Protein >1,000 (A) 10/08/2021 07:15 PM    Glucose Negative 10/08/2021 07:15 PM    Ketone Negative 10/08/2021 07:15 PM    Bilirubin Negative 10/08/2021 07:15 PM    Urobilinogen 1.0 10/08/2021 07:15 PM    Nitrites Negative 10/08/2021 07:15 PM    Leukocyte Esterase LARGE (A) 10/08/2021 07:15 PM    Epithelial cells 1+ 10/08/2021 07:15 PM    Bacteria FEW (A) 10/08/2021 07:15 PM    WBC 21 to 35 10/08/2021 07:15 PM    RBC 4 to 10 10/08/2021 07:15 PM        Micro Recent Labs     10/10/21  1631   CULT NO GROWTH AFTER 18 HOURS     Recent Labs     10/10/21  1631   CULT NO GROWTH AFTER 18 HOURS          Imaging:  XR (Most Recent). Results from [facility] encounter on 10/08/21    XR CHEST PORT    Narrative  Portable Chest    CPT CODE: 11365    HISTORY: Line placements. FINDINGS:    Compared with study done earlier the same day at 1521 hours. Right internal jugular line tip at the proximal SVC. Additional wires overlie  the patient. Surgical clips at the epigastrium. Ectasia of the descending  thoracic aorta elevated diaphragm on the right stable. No pneumothorax, effusion  or new lung consolidation. Impression  Right CVL tip at the proximal SVC. No pneumothorax. CT (Most Recent) Results from Hospital Encounter encounter on 10/08/21    CT HEAD WO CONT    Narrative  CT Head without contrast    HISTORY: Hypotension, dizziness. COMPARISON: None    TECHNIQUE:  Axial imaging from the skull base to the vertex was performed  without intravenous contrast.  Coronal and sagittal reformations. All CT scans  are performed using dose optimization techniques as appropriate to the performed  exam including the following: Automated exposure control, adjustment of mA  and/or kV according to patient size, and use of iterative reconstructive  technique. FINDINGS:    No intracranial hemorrhage. No evidence of midline shift, mass effect, or  obvious mass lesion. There is preservation of the gray and white matter  differentiation, no acute infarct (Please note that CT is less sensitive in the  detection of early infarct). Moderately severe periventricular white matter changes of the cerebrum. The size of the ventricles and sulci are normal.  No extra axial fluid  collections. Heavily calcified intracranial carotid arteries. Visualized paranasal sinuses are clear. No evidence for skull fracture. Impression  No CT finding for acute territorial infarct or acute intracranial hemorrhage. Fairly extensive periventricular white matter change. Nonspecific but often  associated with chronic microvascular ischemic disease. ECHO No results found for this or any previous visit. EKG No results found for this or any previous visit. Recent Results (from the past 12 hour(s))   GLUCOSE, POC    Collection Time: 10/11/21  6:23 PM   Result Value Ref Range    Glucose (POC) 100 70 - 110 mg/dL       Assessment/Plan:   64 y.o. female with PMH of ESRD on HD, chronic hypotension on midodrine, ureteral stricture s/p stent by urology, recurrent UTIs, T2DM, depression, GERD, chronic back pain presented to the ED from dialysis center after having low BPs after an HD treatment with associated lightheadedness. Admitted for hypotension requiring pressors. Acute on chronic hypotension, on midodrine at home  Concern for septic shock in setting of Hx recurrent UTIs  -Pt with chronically low Bps on midodrine in OP setting recently increased to 2.5 mg TID   -pt afebrile, no leukocytosis on admission. Lactic initially raised but has normalized. Procal 0.71.  -UA on admission positive for protein, blood, leukocyte esterase, bacteria, WBC, RBC   -BCx 10/8 positive for coagulase negative staph only 1 bottle, which may contamination  -UCx, BCx (10/10) wo growth to date  -ID, nephro consulted   -on levophed in ICU, d/c 10/11  -ECHO (11/11) EF 55-60. Normal systolic and diastolic fxn.    -current meds: midodrine 10 TID, gentamicin 70 mg, vancomycin 750 mg    -VS on exam: BP 95/36 stable off levophed. Pt is well appearing wo complaints at the time of exam. Stable for admission to step down. Plan:   -admit to stepdown   -cardiac monitoring   -appreciate recs per nephro, urology, ID   -c/w midodrine, fludrocortisone  -c/w gentamicin, vancomycin pending urine/bcx   -F/u UCx, repeat BCx   -daily BMP, CBC, Mg, Phos  -daily gentamicin, vanc levels  -fall precautions  -aspiration precautions  -monitor VS   -PT/OT/CM    Anemia of chronic disease 2/2 renal disease   -Hb of 10.9, mcv 94.5  on admission.  Now 8.8  -Managed by Nephro  Plan  -continue reticrit  -daily cbc     ESRD   -on dialysis MWF  -BMP: Na 138 K 4.7 BUN 51 Cr 8.03 GFR 5 Mg 2.5 Phos 6.6  -last dialyzed on 10/11    Plan:   -dialysis per nephro    Hx ureteral stricture s/p stent  -urology consulted   -renal ultrasound 10/9: showed atrophic kidneys compatible with CKD, no hydronephrosis, stent not clearly visualized     Plan:  -no intervention indicated at this time      DM   -not on any meds for DM at home   -10/8 A1c 4.6  -BS btw  on this admission    Plan:   -POC glucose as needed   -daily BMP    GERD  -continue famotidine     Chronic Back Pain 2/2 DDD  Pt reports her back pain ongoing and baseline for her. Home regimenL gabapentin 200mg after dialysis prn. Hydromorphone 2mg prn.  -hold hydromorphone in setting of low Bps  -restart gabapentin 200mg   -tylenol prn  -lidocaine patch    Depression  Home regimen - cymbalta 20mg. Per manufacture should avoid use in hemodialysis pts.  If not helping with mood suggest changing to medication   -discuss with pt in am, restart cymbalta low dose or suggest sertraline instead in AM    Diet Regular    DVT Prophylaxis SQH   Code status Full   Disposition TBD     [place] [address], son   [phone]      Garcia Méndez MD , PGY-1   [facility]   Senior Pager: [phone]   October 11, 2021, 11:28 PM

## 2021-10-12 NOTE — PROGRESS NOTES
Infectious Disease progress Note        Reason: septic shock, cystitis    Current abx Prior abx     Vancomycin, gentamicin since 10/9 Ceftriaxone 10/5  Cefepime 10/10-10/11  Meropenem 10/8-10/9     Lines:       Assessment :    66 y. o. 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      E.coli bloodstream infection in 3/2019 (pan susceptible e.coli)     Acinetobacter UTI in 4/2019 -  (intermediately susceptible to ceftriaxone, ceftazidime, pip/tazo)     Hospitalization 2/2020 for  early hemorrhagic cystitis - Right PCNL. No hydronephrosis noted on Ct scan 2/20/20. +nt edema of bladder c/w cystitis. Urine culture 2/20/20-greater than 100,000 colonies of mixed gram-positive mike including 20,000 staph aureus- MSSA.      S/P PCNL exchange on 2/21/20. Urology help appreciated.      urine culture 4/13/20 positive for >100,000 colonies of strep. Bovis   urine cx 2/19/21- >100,000 colonies of pseudomonas    S/p ciprofloxacin 9/28-10/4  Ureteral stricture S/P stent change 10/5  Urine cx 10/5/21- no growth    Clinical presentation c/w septic shock due to partially treated uti, complicated UTI  Single positive blood cx for coagulase negative staph on 10/8 likely contamination    Negative repeat blood cultures 10/10. Improved suprapubic discomfort. Delays in sending urine cultures. Urine culture collected 10/9/2021 enterococcus, candida species- d/w micro lab  Since patient is symptomatic, had recent urological intervention, risk of complicated candida UTI, will treat    Clinically better. Off pressors. Patient has documented h/o ciprofloxacin allergy. She has tolerated ciprofloxacin on multiple occasions in the past.      Recommendations   - recommend vancomycin, gentamicin.  Add high dose po fluconazole to cover for candida nivariensis  -f/u susceptibility of Enterococcus in urine cx and modify antibiotics as needed  -Follow-up repeat blood cx 10/10/2021  - f/u urology recommendations   -Monitor blood pressure, clinically    Above plan was discussed in details with patient. Please call me if any further questions or concerns. Will continue to participate in the care of this patient. HPI:         Patient states that she does not feel too good today. Complains of drop in blood pressure and feeling dizzy when she attempts to walk to the bathroom. She has noted improvement in her urinary urgency, suprapubic discomfort. Current Discharge Medication List      CONTINUE these medications which have NOT CHANGED    Details   vitamin B complex (B COMPLEX VITAMINS PO) Take 1 Tablet by mouth daily. heparin sod,porcine/0.9 % NaCl (heparin flush, porcine, in ns) 100 unit/mL kit Heparin Sodium (Porcine) 1,000 Units/mL Systemic      doxercalciferol (HECTOROL IV) 4 mcg. HYDROmorphone (DILAUDID) 2 mg tablet as needed. meclizine (ANTIVERT) 25 mg tablet Take 25 mg by mouth as needed. methoxy peg-epoetin beta (MIRCERA INJECTION) 30 mcg. Narcan 4 mg/actuation nasal spray       vitamin D3-folic acid 9,507 unit- 1 mg tab Take 1 Tablet by mouth.      midodrine (PROAMATINE) 2.5 mg tablet Take 1 Tablet by mouth three (3) times daily (with meals). DULoxetine (Cymbalta) 20 mg capsule Take 20 mg by mouth daily. fludrocortisone (FLORINEF) 0.1 mg tablet Take 1 Tab by mouth daily. Qty: 30 Tab, Refills: 5    Associated Diagnoses: Idiopathic hypotension      b complex vitamins (Vitamins B Complex) tablet Take 1 Tab by mouth daily. estradioL (Estrace) 0.01 % (0.1 mg/gram) vaginal cream Apply a fingertip amount around the urethra three times a week. Qty: 30 g, Refills: 3      biotin 1,000 mcg chew Take 1 Tab by mouth daily.       cyanocobalamin 1,000 mcg tablet Take 1,000 mcg by mouth daily. gabapentin (NEURONTIN) 100 mg capsule Take 100 mg by mouth nightly. AS needed      lactobacillus sp. 50 billion cpu (BIO-K PLUS) 50 billion cell -375 mg cap capsule Take 1 Cap by mouth daily. Qty: 30 Cap, Refills: 2      tamsulosin (FLOMAX) 0.4 mg capsule Take 1 Cap by mouth daily. Qty: 90 Cap, Refills: 3      sodium bicarbonate 650 mg tablet Take 2 Tabs by mouth three (3) times daily. Indications: excess body acid  Qty: 30 Tab, Refills: 1      acetaminophen (TYLENOL) 325 mg tablet Take 650 mg by mouth two (2) times a day. allopurinoL (ZYLOPRIM) 100 mg tablet Take 200 mg by mouth daily. ascorbic acid, vitamin C, (VITAMIN C) 500 mg tablet Take 500 mg by mouth daily. calcitRIOL (ROCALTROL) 0.25 mcg capsule Take 0.25 mcg by mouth daily. cholecalciferol (VITAMIN D3) (2,000 UNITS /50 MCG) cap capsule Take 2,000 Units by mouth two (2) times a day. Take two tabs a total of 4000 units      latanoprost (XALATAN) 0.005 % ophthalmic solution Administer 1 Drop to both eyes nightly. One drop at bedtime      levothyroxine (SYNTHROID) 125 mcg tablet Take 125 mcg by mouth Daily (before breakfast). omeprazole (PRILOSEC) 20 mg capsule Take 20 mg by mouth daily. ondansetron (ZOFRAN ODT) 4 mg disintegrating tablet Take 4 mg by mouth every eight (8) hours as needed for Nausea or Vomiting. vit B Cmplx 3-FA-Vit C-Biotin (NEPHRO HOWIE RX) 1- mg-mg-mcg tablet Take 1 Tab by mouth daily.              Current Facility-Administered Medications   Medication Dose Route Frequency    lidocaine 4 % patch 1 Patch  1 Patch TransDERmal Q24H    [START ON 10/13/2021] gabapentin (NEURONTIN) capsule 200 mg  200 mg Oral 3 times weekly    acetaminophen (TYLENOL) tablet 650 mg  650 mg Oral Q6H PRN    gentamicin (GARAMYCIN) 70 mg in 0.9% sodium chloride 100 mL IVPB  70 mg IntraVENous DIALYSIS ONE TIME DOSE    levothyroxine (SYNTHROID) tablet 125 mcg  125 mcg Oral 6am    latanoprost (XALATAN) 0.005 % ophthalmic solution 1 Drop  1 Drop Both Eyes QPM    acetaminophen (TYLENOL) tablet 650 mg  650 mg Oral ONCE    [START ON 10/13/2021] Vancomycin Lab Information: Random Level Due  1 Each Other ONCE    [START ON 10/13/2021] Gentamicin Lab Information: Random Level Due  1 Each Other ONCE    fludrocortisone (FLORINEF) tablet 0.2 mg  0.2 mg Oral DAILY    midodrine (PROAMATINE) tablet 10 mg  10 mg Oral TID WITH MEALS    epoetin caitlin-epbx (RETACRIT) injection 8,000 Units  8,000 Units SubCUTAneous Q MON, WED & FRI    famotidine (PF) (PEPCID) 20 mg in 0.9% sodium chloride 10 mL injection  20 mg IntraVENous Q24H    Gentamicin: Pharmacy to dose   Other Rx Dosing/Monitoring    VANCOMYCIN INFORMATION NOTE   Other Rx Dosing/Monitoring    diphenhydrAMINE-zinc acetate 2%-0.1% (BENADRYL) cream   Topical TID PRN    glucose chewable tablet 16 g  4 Tablet Oral PRN    glucagon (GLUCAGEN) injection 1 mg  1 mg IntraMUSCular PRN    dextrose (D50W) injection syrg 12.5-25 g  25-50 mL IntraVENous PRN    heparin (porcine) injection 5,000 Units  5,000 Units SubCUTAneous Q8H       Allergies: Ciprofloxacin, Cyclopentolate, Iron sucrose, Midodrine, and Statins-hmg-coa reductase inhibitors    Temp (24hrs), Av.1 °F (36.7 °C), Min:97.9 °F (36.6 °C), Max:98.4 °F (36.9 °C)    Visit Vitals  BP (!) 99/50 (BP 1 Location: Right upper arm, BP Patient Position: At rest)   Pulse 94   Temp 98.4 °F (36.9 °C)   Resp 18   Ht 5' 2\" (1.575 m)   Wt 93.9 kg (207 lb)   SpO2 97%   BMI 37.86 kg/m²       ROS: 12 point ROS obtained in details. Pertinent positives as mentioned in HPI,   otherwise negative    Physical Exam:    General:  Alert, cooperative, NAD   Head:  Normocephalic, without obvious abnormality, atraumatic. Eyes:  Conjunctivae/corneas clear. Nose: Nares normal. Septum midline. Mucosa normal. No drainage or sinus tenderness.    Throat: Lips, mucosa, and tongue normal. Teeth and gums normal.   Neck: Supple, symmetrical, trachea midline, no adenopathy, no carotid bruit and no JVD. Lungs:   Symmetrical chest rise; good AE bilat; CTAB; no wheezes/rhonchi/rales noted. Heart:  RRR, S1, S2 normal   Abdomen:   Soft, non-tender. Bowel sounds normal. No masses,  No organomegaly. Extremities: Extremities normal, atraumatic, no cyanosis or edema. Pulses: 2+ and symmetric all extremities. Skin: Skin color, texture, turgor normal. Hives to R forearm   Neurologic: Grossly nonfocal   Devices: PIVs,             Labs: Results:   Chemistry Recent Labs     10/11/21  0535 10/10/21  0511   GLU 85 101*    138   K 4.7 5.3    98*   CO2 29 31   BUN 51* 37*   CREA 8.03* 6.25*   CA 8.5 8.6   AGAP 8 9   BUCR 6* 6*      CBC w/Diff Recent Labs     10/11/21  0535 10/10/21  0937   WBC 8.0 8.0   RBC 2.96* 3.11*   HGB 8.8* 9.2*   HCT 28.2* 29.8*    254   GRANS 58 69   LYMPH 27 18*   EOS 2 2      Microbiology Recent Labs     10/10/21  1631   CULT NO GROWTH 2 DAYS          RADIOLOGY:    All available imaging studies/reports in connect care for this admission were reviewed  High complexity decision making was performed during the evaluation of this patient at high risk for decompensation with multiple organ involvement         Disclaimer: Sections of this note are dictated utilizing voice recognition software, which may have resulted in some phonetic based errors in grammar and contents. Even though attempts were made to correct all the mistakes, some may have been missed, and remained in the body of the document. If questions arise, please contact our department.     Dr. Dmitry Valdes, Infectious Disease Specialist  [phone]  October 12, 2021  10:14 AM

## 2021-10-12 NOTE — ROUTINE PROCESS
0710: Bedside and Verbal shift change report given to [address] (oncoming nurse) by Shawn Rojo RN (offgoing nurse). Report included the following information SBAR, Kardex, Intake/Output, MAR and Recent Results. 1100: Spoke with MD r/t pt elevated HR in the 180s upon ambulation and is hypotensive. Will keep pt bed rest.    1357: 500mL NaCl bolus given. 1615: Spoke with MD. Verbal order with read back to hold Amiodarone infusion and give another 500mL NaCl bolus. 1625: 500mL NaCl bolus given. 1915: Bedside and Verbal shift change report given to [employer] (oncoming nurse) by [address] (offgoing nurse). Report included the following information SBAR, Kardex, Intake/Output, MAR and Recent Results. sec to PE  apprec ICU plan of care  cont heparin gtt  cont intubation

## 2021-10-13 LAB
ANION GAP SERPL CALC-SCNC: 9 MMOL/L (ref 3–18)
BUN SERPL-MCNC: 37 MG/DL (ref 7–18)
BUN/CREAT SERPL: 6 (ref 12–20)
CALCIUM SERPL-MCNC: 8.1 MG/DL (ref 8.5–10.1)
CHLORIDE SERPL-SCNC: 107 MMOL/L (ref 100–111)
CO2 SERPL-SCNC: 24 MMOL/L (ref 21–32)
CREAT SERPL-MCNC: 6.43 MG/DL (ref 0.6–1.3)
ERYTHROCYTE [DISTWIDTH] IN BLOOD BY AUTOMATED COUNT: 15.9 % (ref 11.6–14.5)
GENTAMICIN SERPL-MCNC: 3.8 UG/ML (ref 0.5–10)
GLUCOSE SERPL-MCNC: 91 MG/DL (ref 74–99)
HCT VFR BLD AUTO: 28 % (ref 35–45)
HGB BLD-MCNC: 8.6 G/DL (ref 12–16)
MAGNESIUM SERPL-MCNC: 2.2 MG/DL (ref 1.6–2.6)
MCH RBC QN AUTO: 29.9 PG (ref 24–34)
MCHC RBC AUTO-ENTMCNC: 30.7 G/DL (ref 31–37)
MCV RBC AUTO: 97.2 FL (ref 78–100)
PHOSPHATE SERPL-MCNC: 7.1 MG/DL (ref 2.5–4.9)
PLATELET # BLD AUTO: 253 K/UL (ref 135–420)
PMV BLD AUTO: 10.7 FL (ref 9.2–11.8)
POTASSIUM SERPL-SCNC: 4.9 MMOL/L (ref 3.5–5.5)
RBC # BLD AUTO: 2.88 M/UL (ref 4.2–5.3)
SODIUM SERPL-SCNC: 140 MMOL/L (ref 136–145)
VANCOMYCIN SERPL-MCNC: 15.5 UG/ML (ref 5–40)
WBC # BLD AUTO: 4.9 K/UL (ref 4.6–13.2)

## 2021-10-13 PROCEDURE — 36415 COLL VENOUS BLD VENIPUNCTURE: CPT

## 2021-10-13 PROCEDURE — 80202 ASSAY OF VANCOMYCIN: CPT

## 2021-10-13 PROCEDURE — 74011250637 HC RX REV CODE- 250/637: Performed by: INTERNAL MEDICINE

## 2021-10-13 PROCEDURE — 74011250636 HC RX REV CODE- 250/636: Performed by: INTERNAL MEDICINE

## 2021-10-13 PROCEDURE — 74011000250 HC RX REV CODE- 250: Performed by: PHYSICIAN ASSISTANT

## 2021-10-13 PROCEDURE — 84100 ASSAY OF PHOSPHORUS: CPT

## 2021-10-13 PROCEDURE — 80048 BASIC METABOLIC PNL TOTAL CA: CPT

## 2021-10-13 PROCEDURE — 74011250636 HC RX REV CODE- 250/636: Performed by: PHYSICIAN ASSISTANT

## 2021-10-13 PROCEDURE — 74011250637 HC RX REV CODE- 250/637: Performed by: STUDENT IN AN ORGANIZED HEALTH CARE EDUCATION/TRAINING PROGRAM

## 2021-10-13 PROCEDURE — 80170 ASSAY OF GENTAMICIN: CPT

## 2021-10-13 PROCEDURE — 99233 SBSQ HOSP IP/OBS HIGH 50: CPT | Performed by: INTERNAL MEDICINE

## 2021-10-13 PROCEDURE — 74011250637 HC RX REV CODE- 250/637

## 2021-10-13 PROCEDURE — 97166 OT EVAL MOD COMPLEX 45 MIN: CPT

## 2021-10-13 PROCEDURE — 99222 1ST HOSP IP/OBS MODERATE 55: CPT | Performed by: NURSE PRACTITIONER

## 2021-10-13 PROCEDURE — 76450000000

## 2021-10-13 PROCEDURE — 74011250637 HC RX REV CODE- 250/637: Performed by: PHYSICIAN ASSISTANT

## 2021-10-13 PROCEDURE — 83735 ASSAY OF MAGNESIUM: CPT

## 2021-10-13 PROCEDURE — 74011000250 HC RX REV CODE- 250: Performed by: STUDENT IN AN ORGANIZED HEALTH CARE EDUCATION/TRAINING PROGRAM

## 2021-10-13 PROCEDURE — 85027 COMPLETE CBC AUTOMATED: CPT

## 2021-10-13 PROCEDURE — 90935 HEMODIALYSIS ONE EVALUATION: CPT

## 2021-10-13 PROCEDURE — 65660000000 HC RM CCU STEPDOWN

## 2021-10-13 PROCEDURE — 2709999900 HC NON-CHARGEABLE SUPPLY

## 2021-10-13 RX ORDER — ONDANSETRON 2 MG/ML
4 INJECTION INTRAMUSCULAR; INTRAVENOUS
Status: DISCONTINUED | OUTPATIENT
Start: 2021-10-13 | End: 2021-10-14 | Stop reason: HOSPADM

## 2021-10-13 RX ORDER — AMOXICILLIN AND CLAVULANATE POTASSIUM 500; 125 MG/1; MG/1
1 TABLET, FILM COATED ORAL EVERY 24 HOURS
Status: DISCONTINUED | OUTPATIENT
Start: 2021-10-13 | End: 2021-10-14 | Stop reason: HOSPADM

## 2021-10-13 RX ADMIN — LATANOPROST 1 DROP: 50 SOLUTION OPHTHALMIC at 21:51

## 2021-10-13 RX ADMIN — MIDODRINE HYDROCHLORIDE 10 MG: 5 TABLET ORAL at 13:23

## 2021-10-13 RX ADMIN — MIDODRINE HYDROCHLORIDE 10 MG: 5 TABLET ORAL at 16:39

## 2021-10-13 RX ADMIN — FLUDROCORTISONE ACETATE 0.2 MG: 0.1 TABLET ORAL at 08:17

## 2021-10-13 RX ADMIN — APIXABAN 5 MG: 5 TABLET, FILM COATED ORAL at 17:15

## 2021-10-13 RX ADMIN — ONDANSETRON 4 MG: 2 INJECTION INTRAMUSCULAR; INTRAVENOUS at 09:30

## 2021-10-13 RX ADMIN — LEVOTHYROXINE SODIUM 125 MCG: 25 TABLET ORAL at 05:34

## 2021-10-13 RX ADMIN — ACETAMINOPHEN 650 MG: 325 TABLET ORAL at 21:20

## 2021-10-13 RX ADMIN — MIDODRINE HYDROCHLORIDE 10 MG: 5 TABLET ORAL at 08:16

## 2021-10-13 RX ADMIN — AMOXICILLIN AND CLAVULANATE POTASSIUM 1 TABLET: 500; 125 TABLET, FILM COATED ORAL at 21:40

## 2021-10-13 RX ADMIN — GABAPENTIN 200 MG: 100 CAPSULE ORAL at 17:27

## 2021-10-13 RX ADMIN — FAMOTIDINE 20 MG: 10 INJECTION INTRAVENOUS at 08:16

## 2021-10-13 RX ADMIN — EPOETIN ALFA-EPBX 8000 UNITS: 4000 INJECTION, SOLUTION INTRAVENOUS; SUBCUTANEOUS at 21:41

## 2021-10-13 RX ADMIN — HEPARIN SODIUM 5000 UNITS: 5000 INJECTION INTRAVENOUS; SUBCUTANEOUS at 13:24

## 2021-10-13 RX ADMIN — ACETAMINOPHEN 650 MG: 325 TABLET ORAL at 13:23

## 2021-10-13 RX ADMIN — FLUCONAZOLE 400 MG: 200 TABLET ORAL at 21:20

## 2021-10-13 RX ADMIN — VANCOMYCIN HYDROCHLORIDE 1000 MG: 1 INJECTION, POWDER, LYOPHILIZED, FOR SOLUTION INTRAVENOUS at 11:24

## 2021-10-13 RX ADMIN — HEPARIN SODIUM 5000 UNITS: 5000 INJECTION INTRAVENOUS; SUBCUTANEOUS at 05:36

## 2021-10-13 NOTE — PROGRESS NOTES
Cardiology Progress Note    Admit Date: 10/8/2021  Attending Cardiologist: Dr. Sarita Carr:     Hospital Problems  Date Reviewed: 9/21/2021        Codes Class Noted POA    Septic shock ([facility] [address] ICD-10-CM: A41.9, R65.21  ICD-9-CM: 038.9, 785.52, 995.92  10/8/2021 Unknown        * (Principal) Hypotension ICD-10-CM: I95.9  ICD-9-CM: 458.9  5/15/2020 Yes                  -SVT appears to have new onset paroxysmal atrial tachycardia, cannot rule out atrial fibrillation/flutter with elevated rates up to 180s with any exertion. Suspect exacerbated by acute illness. Rhythm stable s/p IVF. Appears IV amio was never started due to low BP. -Sepsis with recurrent UTIs. Presented from HD center with hypotension. Admitted with sepsis requiring pressor support. Improving.  -Hypotension in setting of sepsis requiring pressor support now improved. H/o hypotension on midodrine as outpatient.  -Ureteral stricture s/p stent exchange 10/5.  -Diabetes mellitus. -ESRD on HD. -Hypothyroidism on synthroid.  -Chronic anemia.  -Chronic back pain. -Depression.  -Normal EF 55-60% by echo 10/2021.  -Low risk nuclear stress 11/2020.     Primary cardiologist Dr. Madi Ackerman:     Rhythm now appears stable s/p IVFs yesterday. Appears IV amio was never started due to hypotension. Will trend hemodynamics and rhythm following HD today and make further recommendations. Recommend cautious fluid removal in setting of recent infection. Patient is continued on midodrine and florinef. If clear cut afib/flutter is documented, will need to discuss anticoagulation if no concerns of bleeding. Subjective:     HR overall sinus with PACs.  Patient increasing activity with stable renal     Objective:      Patient Vitals for the past 8 hrs:   Temp Pulse Resp BP SpO2   10/13/21 1145 -- (!) 57 -- 101/73 --   10/13/21 1130 -- (!) 59 -- 112/88 --   10/13/21 1115 -- (!) 56 -- (!) 119/59 --   10/13/21 1100 -- 64 -- 112/64 --   10/13/21 1045 -- 65 -- 118/64 --   10/13/21 1030 -- 64 -- 115/64 --   10/13/21 1015 -- (!) 58 -- (!) 116/54 --   10/13/21 1000 -- (!) 55 -- (!) 119/59 --   10/13/21 0945 -- 63 -- (!) 118/55 --   10/13/21 0930 -- (!) 56 -- 116/63 --   10/13/21 0915 -- 64 -- 122/76 --   10/13/21 0857 98.2 °F (36.8 °C) 69 18 (!) 146/75 --   10/13/21 0735 97.9 °F (36.6 °C) 66 18 119/76 98 %   10/13/21 0422 97.9 °F (36.6 °C) 67 20 (!) 116/59 99 %         Patient Vitals for the past 96 hrs:   Weight   10/12/21 1958 213 lb (96.6 kg)   10/10/21 1110 207 lb (93.9 kg)       TELE: sinus, pacs               Current Facility-Administered Medications   Medication Dose Route Frequency Last Admin    vancomycin (VANCOCIN) 1,000 mg in 0.9% sodium chloride 250 mL (VIAL-MATE)  1,000 mg IntraVENous DIALYSIS ONE TIME DOSE 1,000 mg at 10/13/21 1124    ondansetron (ZOFRAN) injection 4 mg  4 mg IntraVENous Q6H PRN 4 mg at 10/13/21 0930    lidocaine 4 % patch 1 Patch  1 Patch TransDERmal Q24H 1 Patch at 10/13/21 0155    gabapentin (NEURONTIN) capsule 200 mg  200 mg Oral 3 times weekly      acetaminophen (TYLENOL) tablet 650 mg  650 mg Oral Q6H PRN      levothyroxine (SYNTHROID) tablet 125 mcg  125 mcg Oral 6am 125 mcg at 10/13/21 0534    latanoprost (XALATAN) 0.005 % ophthalmic solution 1 Drop  1 Drop Both Eyes QPM 1 Drop at 10/12/21 2313    [Held by provider] amiodarone (NEXTERONE) 360 mg in dextrose 200 mL (1.8 mg/mL) infusion  0.5-1 mg/min IntraVENous TITRATE Held at 10/12/21 1400    fluconazole (DIFLUCAN) tablet 400 mg  400 mg Oral Q MON, WED & FRI      fludrocortisone (FLORINEF) tablet 0.2 mg  0.2 mg Oral DAILY 0.2 mg at 10/13/21 0817    midodrine (PROAMATINE) tablet 10 mg  10 mg Oral TID WITH MEALS 10 mg at 10/13/21 0816    epoetin caitlin-epbx (RETACRIT) injection 8,000 Units  8,000 Units SubCUTAneous Q MON, WED & FRI      famotidine (PF) (PEPCID) 20 mg in 0.9% sodium chloride 10 mL injection  20 mg IntraVENous Q24H 20 mg at 10/13/21 0816    GENTAMICIN INFORMATION NOTE   Other Rx Dosing/Monitoring      VANCOMYCIN INFORMATION NOTE   Other Rx Dosing/Monitoring      diphenhydrAMINE-zinc acetate 2%-0.1% (BENADRYL) cream   Topical TID PRN      glucose chewable tablet 16 g  4 Tablet Oral PRN      glucagon (GLUCAGEN) injection 1 mg  1 mg IntraMUSCular PRN      dextrose (D50W) injection syrg 12.5-25 g  25-50 mL IntraVENous PRN      heparin (porcine) injection 5,000 Units  5,000 Units SubCUTAneous Q8H 5,000 Units at 10/13/21 0536         Intake/Output Summary (Last 24 hours) at 10/13/2021 1208  Last data filed at 10/12/2021 1821  Gross per 24 hour   Intake --   Output 100 ml   Net -100 ml       Physical Exam:  General:  alert, cooperative, no distress, appears stated age  Neck:  no JVD  Lungs:  clear to auscultation bilaterally  Heart:  regular rate and rhythm  Abdomen:  abdomen is soft without significant tenderness, masses, organomegaly or guarding  Extremities:  extremities normal, atraumatic, no cyanosis or edema    Visit Vitals  /73   Pulse (!) 57   Temp 98.2 °F (36.8 °C) (Oral)   Resp 18   Ht 5' 2\" (1.575 m)   Wt 213 lb (96.6 kg)   SpO2 98%   BMI 38.96 kg/m²       Data Review:     Labs: Results:       Chemistry Recent Labs     10/13/21  0252 10/12/21  1014 10/11/21  0535   GLU 91 134* 85    138 138   K 4.9 4.9 4.7    103 101   CO2 24 26 29   BUN 37* 28* 51*   CREA 6.43* 5.71* 8.03*   CA 8.1* 8.7 8.5   MG 2.2 2.3 2.5   PHOS 7.1* 5.2* 6.6*   AGAP 9 9 8   BUCR 6* 5* 6*      CBC w/Diff Recent Labs     10/13/21  0252 10/12/21  1014 10/11/21  0535   WBC 4.9 5.4 8.0   RBC 2.88* 3.17* 2.96*   HGB 8.6* 9.5* 8.8*   HCT 28.0* 30.3* 28.2*    246 245   GRANS  --   --  58   LYMPH  --   --  27   EOS  --   --  2      Cardiac Enzymes No results found for: CPK, CK, CKMMB, CKMB, RCK3, CKMBT, CKNDX, CKND1, PATTI, TROPT, TROIQ, GRAHAM, TROPT, TNIPOC, BNP, BNPP   Coagulation No results for input(s): PTP, INR, APTT, INREXT in the last 72 hours.     Lipid Panel No results found for: CHOL, CHOLPOCT, CHOLX, CHLST, CHOLV, 162047, HDL, HDLP, LDL, LDLC, DLDLP, 704898, VLDLC, VLDL, TGLX, TRIGL, TRIGP, TGLPOCT, CHHD, CHHDX   BNP No results found for: BNP, BNPP, XBNPT   Liver Enzymes No results for input(s): TP, ALB, TBIL, AP in the last 72 hours.     No lab exists for component: SGOT, GPT, DBIL   Thyroid Studies Lab Results   Component Value Date/Time    TSH 2.50 10/08/2021 03:50 PM          Signed By: NOVA Grant     October 13, 2021

## 2021-10-13 NOTE — DIALYSIS
ACUTE HEMODIALYSIS FLOW SHEET    HEMODIALYSIS ORDERS: Physician: Dr. Danita Seip: [place]   Duration:  3 hr  BFR: 300   DFR: 600   Dialysate:  Temp 36   K+   2    Ca+  2.5   Na 138   Bicarb 30   Wt Readings from Last 1 Encounters:   10/12/21 96.6 kg (213 lb)   [x] Patient Chart     [] Unable to Obtain     Dry weight/UF Goal: 500 ml               Access:  LUE AVF    Heparin []  Bolus    Units    [] Hourly    Units    [x] None      Catheter locking solution:  n/a   Pre BP:  146/75    Pulse:  69   Respirations: 18    Temperature:  98.2 oral    Tx: NSS   ml/Bolus   [x] N/A   Labs: []  Pre  []  Post   [x] N/A   Additional Orders(medications, blood products, hypotension management): [x] Yes   [] No     [x]  [facility] Consent Verified       GRAFT/FISTULA ACCESS:   []N/A     []Right     [x]Left     []UE     []LE   []AVG   [x]AVF     []Buttonhole    []Medical Aseptic Prep Utilized   [x]No S/S infection  []Redness  []Drainage []Cultured  [] Swelling  [] Pain  Bruit:   [x] Strong    [] Weak       Thrill :   [x] Strong    [] Weak     Needle Gauge: 15   Length: 1 inch   If access problem,  notified: []Yes     [x]N/A     Please describe access if present and not used: N/A       GENERAL ASSESSMENT:    LOC:    [x] Alert   [x]Oriented:  [x] Person  [x] Place  [x]Time              [] Confused  [] Non-Verbal [] Lethargic  [] Obtunded                [] Sedated   [] Agitated  [] Restless    []  Non-responsive        CARDIAC: [x]Regular      [] Irregular   [] Pericardial Rub  [] JVD          []  Monitored  [] Bedside  [] Remotely monitored       LUNGS:   SaO2  %   [x] Clear  [] Coarse  [] Crackles  [] Wheezing                                        [x] Diminished     Location : []RLL   []LLL    [x]RUL  [x]LALI   Cough: []Productive  []Dry  [x]N/A   Respirations:  [x]Easy  []Labored   Therapy:  [x]RA  []NC L/min    Mask: []NRB  [] Venti    O2%                  []Ventilator  []Intubated  [] Trach  [] BiPaP     GI / ABDOMEN:                     [] Flat    [] Distended    [x] Soft    [] Firm   []  Obese                   [] Diarrhea  [x] Bowel Sounds  [] Nausea  [] Vomiting                   [] Fecal Management System  [] Incontinent of stool      / URINE ASSESSMENT:                   [] Voiding   [x] Oliguria  [] Anuria   []  Cline                  [] Incontinent of urine     SKIN:   [x] Warm  [] Hot  [] Cold   [] Cool   [x] Dry    [] Pale   [] Diaphoretic                  [] Flushed  [] Jaundiced  [] Cyanotic  [] Rash  [] Weeping     EDEMA: [] None  [x]Generalized  [] Pitting [] 1    [] 2    [] 3    [] 4                 [] Facial  [] Pedal  []  UE  [] LE     MOBILITY:  [x] Bed    [] Stretcher     PAIN:  [x] 0 []1  []2   []3   []4   []5   []6   []7   []8   []9   []10            Scale 0-10  Action/Follow Up:        All Vitals and Treatment Details on Attached [address]: [facility]          Room # 359/01   [] 1st Time Acute      [] Stat       [x] Routine      [] Urgent     [x] Acute Room  []  Bedside  [] ICU/CCU  [] ER   Isolation Precautions:  [x] Dialysis    Contact       ALLERGIES:     Allergies   Allergen Reactions    Ciprofloxacin Hives    Cyclopentolate Unknown (comments)    Iron Sucrose Diarrhea    Midodrine Unknown (comments)     Denies 910/1/21    Statins-Hmg-Coa Reductase Inhibitors Other (comments)     Body ache      Code Status:  DNR     Hepatitis Status     No results found for: HAMAT, HAAB, HABT, HAAT, HBSAG, HBSB, HBSAT, HBABN, HBCM, HBCAB, HBCAT, XBCABS, HBEAB, HBEAG, XHEPCS, [id number], HBEGLT, HBCMLT, HBCLT, HBEBLT, [id number], [id number], HAVMLT, [id number], HBCMLT, [id number], HCGAT     Current Labs:      Lab Results   Component Value Date/Time    WBC 4.9 10/13/2021 02:52 AM    Hemoglobin, POC 8.8 (L) 09/24/2020 08:45 AM    HGB 8.6 (L) 10/13/2021 02:52 AM    Hematocrit, POC 26 (L) 09/24/2020 08:45 AM    HCT 28.0 (L) 10/13/2021 02:52 AM    PLATELET 396 75/28/4197 02:52 AM    MCV 97.2 10/13/2021 02:52 AM Lab Results   Component Value Date/Time    Sodium 140 10/13/2021 02:52 AM    Potassium 4.9 10/13/2021 02:52 AM    Chloride 107 10/13/2021 02:52 AM    CO2 24 10/13/2021 02:52 AM    Anion gap 9 10/13/2021 02:52 AM    Glucose 91 10/13/2021 02:52 AM    BUN 37 (H) 10/13/2021 02:52 AM    Creatinine 6.43 (H) 10/13/2021 02:52 AM    BUN/Creatinine ratio 6 (L) 10/13/2021 02:52 AM    GFR est AA 8 (L) 10/13/2021 02:52 AM    GFR est non-AA 6 (L) 10/13/2021 02:52 AM    Calcium 8.1 (L) 10/13/2021 02:52 AM          DIET:  DIET ADULT      PRIMARY NURSE REPORT:   Pre Dialysis:  MONTANA Jimenez    Time: 08:32      EDUCATION:    [x] Patient [] Other           Knowledge Basis: []None [x]Minimal [] Substantial [] Unable to assess at this time.    Barriers to learning  [x]N/A   [] Access Care     [] S&S of infection  [] Fluid Management  [] K+   [x] Procedural    []Albumin   [] Medications   [] Tx Options   [] Transplant   [] Diet   [] Other   Teaching Tools:  [x] Explain  [] Demo  [] Handouts [] Video  Patient response: [x] Verbalized understanding  [] Teach back  [] Return demonstration   [] Requires follow up      [x]Time Out/Safety Check  [x] Extracorporeal Circuit Tested for integrity       [address] - Before each treatment:     Machine Number:                   [facility]                                    [x] Unit Machine # 5 with centralized RO                                                                                                          Alarm Test:  Pass time 08:15            [x] RO/Machine Log Complete    Machine Temp    36.0             Dialysate: pH  7.4    Conductivity: Meter 14.0     HD Machine  13.9      TCD: 14.0  Dialyzer Lot # [id number]     Blood Tubing Lot # [id number]    Saline Lot # [id number]     CHLORINE TESTING-Before each treatment and every 4 hours    Total Chlorine: [x] less than 0.1 ppm  Initial Time Check: 09:00       4 Hr/2nd Check Time:    (if greater than 0.1 ppm from Primary then every 30 minutes from Secondary)     TREATMENT INITIATION - with Dialysis Precautions:   [x] All Connections Secured              [x] Saline Line Double Clamped   [x] Venous Parameters Set               [x] Arterial Parameters Set    [x] Prime Given 250ml NSS              [x]Air Foam Detector Engaged      Treatment Initiation Note:  08:55  Pt arrived to HD c/o nausea. Pt A &O X 3, follows commands, no distress noted, time out done with pt. During Treatment Notes:  09:15  LUER AVF assessed no abnormalities noted, bruit and thrill strong. AVF accessed by HD tech without any difficulty, pt tolerated well. Vascular access visible with arterial and venous line connections intact. 09:30  Zofran 4mg given IVP for nausea. Vascular access visible with arterial and venous line connections intact. 09:45  Vascular access visible with arterial and venous line connections intact. 10:00  Vascular access visible with arterial and venous line connections intact. 10:15  VSS, vascular access visible with arterial and venous line connections intact. 10:30  Vascular access visible with arterial and venous line connections intact. 10:45  Vascular access visible with arterial and venous line connections intact. 11:00   Vascular access visible with arterial and venous line connections intact. 11:15  Vascular access visible with arterial and venous line connections intact. 11:24  Hung Vanco 1g IV over 1hr via Alaris pump. 11:30  Vascular access visible with arterial and venous line connections intact. 11:45  Vascular access visible with arterial and venous line connections intact. 12:00  Vascular access visible with arterial and venous line connections intact.      Medication Dose Volume Route Time Dat Nurse, Title   Zofran 4mg  IVP 09:30 Maikel Tom RN   Vanco 1g 250 IV 11:24 Maikel Tom RN     Post Assessment  Dialyzer Cleared:[] Good [x] Fair  [] Poor  Blood processed:  50.2 L  Net UF Removed:  500 Ml  Post /67  Pulse  74 Resp  18   Temp 98.6    Post Tx Vascular Access:   AVF/AVG: Bleeding stopped with  Arterial Pressure for 8 min   Venous Pressure for 8 min               Skin:[x] Warm  [x] Dry [] Diaphoretic             []Cool     [] Flushed  [] Pale            [] Cyanotic   Pain:  [x]0  []1 []2  []3 []4  []5  []6 []7 []8  []9  []10     Post Treatment Note:   12:25  HD completed at this time, pt tolerated well. Dressing clean, dry and intact. POST TREATMENT PRIMARY NURSE HANDOFF REPORT:   Post Dialysis:  MONTANA Jimenez                Time:  12:32     Abbreviations: AVG-arterial venous graft, AVF-arterial venous fistula, IJ-Internal Jugular, Subcl-Subclavian, Fem-Femoral, Tx-treatment, AP/HR-apical heart rate, DFR-dialysate flow rate, BFR-blood flow rate, AP-arterial pressure, -venous pressure, UF-ultrafiltrate, TMP-transmembrane pressure, Jasper-Venous, Art-Arterial, RO-Reverse Osmosis

## 2021-10-13 NOTE — PROGRESS NOTES
RENAL DAILY PROGRESS NOTE    Patient: Sharlene Bailon               Sex: female          DOA: 10/8/2021  2:50 PM        YOB: 1945      Age:  64 y.o.        LOS:  LOS: 5 days     Subjective:     Sharlene Bailon is a 64 y.o.  who presents with Septic shock ([facility] [address] [A41.9, R65.21].    Asked to evaluate for esrd,admitted with hypotension,hx of reccurent uti,s/p ureteral stent exchange few days ago  Chief complains: Patient denies nausea, vomiting, chest pain, dizziness, shortness of breath or headache.  - Reviewed last 24 hrs events     Current Facility-Administered Medications   Medication Dose Route Frequency    vancomycin (VANCOCIN) 1,000 mg in 0.9% sodium chloride 250 mL (VIAL-MATE)  1,000 mg IntraVENous DIALYSIS ONE TIME DOSE    ondansetron (ZOFRAN) injection 4 mg  4 mg IntraVENous Q6H PRN    lidocaine 4 % patch 1 Patch  1 Patch TransDERmal Q24H    gabapentin (NEURONTIN) capsule 200 mg  200 mg Oral 3 times weekly    acetaminophen (TYLENOL) tablet 650 mg  650 mg Oral Q6H PRN    levothyroxine (SYNTHROID) tablet 125 mcg  125 mcg Oral 6am    latanoprost (XALATAN) 0.005 % ophthalmic solution 1 Drop  1 Drop Both Eyes QPM    [Held by provider] amiodarone (NEXTERONE) 360 mg in dextrose 200 mL (1.8 mg/mL) infusion  0.5-1 mg/min IntraVENous TITRATE    fluconazole (DIFLUCAN) tablet 400 mg  400 mg Oral Q MON, WED & FRI    fludrocortisone (FLORINEF) tablet 0.2 mg  0.2 mg Oral DAILY    midodrine (PROAMATINE) tablet 10 mg  10 mg Oral TID WITH MEALS    epoetin caitlin-epbx (RETACRIT) injection 8,000 Units  8,000 Units SubCUTAneous Q MON, WED & FRI    famotidine (PF) (PEPCID) 20 mg in 0.9% sodium chloride 10 mL injection  20 mg IntraVENous Q24H    GENTAMICIN INFORMATION NOTE   Other Rx Dosing/Monitoring    VANCOMYCIN INFORMATION NOTE   Other Rx Dosing/Monitoring    diphenhydrAMINE-zinc acetate 2%-0.1% (BENADRYL) cream   Topical TID PRN    glucose chewable tablet 16 g  4 Tablet Oral PRN    glucagon (GLUCAGEN) injection 1 mg  1 mg IntraMUSCular PRN    dextrose (D50W) injection syrg 12.5-25 g  25-50 mL IntraVENous PRN    heparin (porcine) injection 5,000 Units  5,000 Units SubCUTAneous Q8H       Objective:     Visit Vitals  BP (!) 119/59   Pulse (!) 55   Temp 98.2 °F (36.8 °C) (Oral)   Resp 18   Ht 5' 2\" (1.575 m)   Wt 96.6 kg (213 lb)   SpO2 98%   BMI 38.96 kg/m²       Intake/Output Summary (Last 24 hours) at 10/13/2021 1047  Last data filed at 10/12/2021 1821  Gross per 24 hour   Intake --   Output 100 ml   Net -100 ml       Physical Examination:     GEN: AAO X 3,  RS: Chest is bilateral equal, no wheezing / rales / crackles  CVS: S1-S2 heard,   Abdomen: Soft, Non tender,   Extremities: No edema,   CNS: Awake & follows commands,   HEENT: Head is atraumatic, PERRLA, conjunctiva pink & non icteric. No JVD or carotid bruit      Data Review:      Labs:     Hematology:   Recent Labs     10/13/21  0252 10/12/21  1014 10/11/21  0535   WBC 4.9 5.4 8.0   HGB 8.6* 9.5* 8.8*   HCT 28.0* 30.3* 28.2*     Chemistry:   Recent Labs     10/13/21  0252 10/12/21  1014 10/11/21  0535   BUN 37* 28* 51*   CREA 6.43* 5.71* 8.03*   CA 8.1* 8.7 8.5   K 4.9 4.9 4.7    138 138    103 101   CO2 24 26 29   PHOS 7.1* 5.2* 6.6*   GLU 91 134* 85        Images:    XR (Most Recent). CXR reviewed by me and compared with previous CXR Results from Hospital Encounter encounter on 10/08/21    XR CHEST PORT    Narrative  Portable Chest    CPT CODE: 04956    HISTORY: Line placements. FINDINGS:    Compared with study done earlier the same day at 1521 hours. Right internal jugular line tip at the proximal SVC. Additional wires overlie  the patient. Surgical clips at the epigastrium. Ectasia of the descending  thoracic aorta elevated diaphragm on the right stable. No pneumothorax, effusion  or new lung consolidation. Impression  Right CVL tip at the proximal SVC. No pneumothorax.        CT (Most Recent) [facility]    CT HEAD WO CONT    Narrative  CT Head without contrast    HISTORY: Hypotension, dizziness. COMPARISON: None    TECHNIQUE:  Axial imaging from the skull base to the vertex was performed  without intravenous contrast.  Coronal and sagittal reformations. All CT scans  are performed using dose optimization techniques as appropriate to the performed  exam including the following: Automated exposure control, adjustment of mA  and/or kV according to patient size, and use of iterative reconstructive  technique. FINDINGS:    No intracranial hemorrhage. No evidence of midline shift, mass effect, or  obvious mass lesion. There is preservation of the gray and white matter  differentiation, no acute infarct (Please note that CT is less sensitive in the  detection of early infarct). Moderately severe periventricular white matter changes of the cerebrum. The size of the ventricles and sulci are normal.  No extra axial fluid  collections. Heavily calcified intracranial carotid arteries. Visualized paranasal sinuses are clear. No evidence for skull fracture. Impression  No CT finding for acute territorial infarct or acute intracranial hemorrhage. Fairly extensive periventricular white matter change. Nonspecific but often  associated with chronic microvascular ischemic disease. EKG No results found for this or any previous visit. I have personally reviewed the old medical records and patient's labs    Plan / Recommendation:      1. Esrd,seen during dialysis 1030 am,uf 500 cc ,bp and pulse are stable so far  2.hypotension on midodrine,and florinef. cortisol level is normal  3.anemia,give epo    D/w     Joyceann Carrel, MD  Nephrology  10/13/2021

## 2021-10-13 NOTE — ROUTINE PROCESS
7447 - Spiritual care paged to have  visit for prayer per pt request.    4922 - Report given to dialysis RN     - Pt off the floor to dialysis    [id number] [id number] - Pt back on the floor from dialysis

## 2021-10-13 NOTE — PROGRESS NOTES
Infectious Disease progress Note        Reason: septic shock, cystitis    Current abx Prior abx     Vancomycin, gentamicin since 10/9 Ceftriaxone 10/5  Cefepime 10/10-10/11  Meropenem 10/8-10/9     Lines:       Assessment :    66 y. o. 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      E.coli bloodstream infection in 3/2019 (pan susceptible e.coli)     Acinetobacter UTI in 4/2019 -  (intermediately susceptible to ceftriaxone, ceftazidime, pip/tazo)     Hospitalization 2/2020 for  early hemorrhagic cystitis - Right PCNL. No hydronephrosis noted on Ct scan 2/20/20. +nt edema of bladder c/w cystitis. Urine culture 2/20/20-greater than 100,000 colonies of mixed gram-positive mike including 20,000 staph aureus- MSSA.      S/P PCNL exchange on 2/21/20. Urology help appreciated.      urine culture 4/13/20 positive for >100,000 colonies of strep. Bovis   urine cx 2/19/21- >100,000 colonies of pseudomonas    S/p ciprofloxacin 9/28-10/4  Ureteral stricture S/P stent change 10/5  Urine cx 10/5/21- no growth    Clinical presentation c/w septic shock due to partially treated uti, complicated UTI  Single positive blood cx for coagulase negative staph on 10/8 likely contamination    Negative repeat blood cultures 10/10. Improved suprapubic discomfort. Urine culture collected 10/9/2021 enterococcus faecalis (vancomycin resistant), candida nivariensis  Since patient is symptomatic, had recent urological intervention, risk of complicated candida UTI, will treat    Clinically better. Resolved suprapubic pain. Improved blood pressure. Patient wishes to be transitioned to comfort measures. Ongoing palliative care discussions. Patient has documented h/o ciprofloxacin allergy.  She has tolerated ciprofloxacin on multiple occasions in the past.      Recommendations   -Discontinue vancomycin, gentamicin. Start p.o. amoxicillin/clavulanate till 10/18/2021. Cont. hiigh dose po fluconazole to cover for candida nivariensis till 10/19/21  -f/u palliative care recommendations regarding goals of care  - f/u urology recommendations   -Monitor blood pressure, clinically    Above plan was discussed in details with patient, primary team. Please call me if any further questions or concerns. Will continue to participate in the care of this patient. HPI:         Patient states that she feels slightly better today. However still feels dizzy when she tries to walk to the bathroom. She has noted improvement in her urinary urgency, suprapubic discomfort. Current Discharge Medication List      CONTINUE these medications which have NOT CHANGED    Details   vitamin B complex (B COMPLEX VITAMINS PO) Take 1 Tablet by mouth daily. heparin sod,porcine/0.9 % NaCl (heparin flush, porcine, in ns) 100 unit/mL kit Heparin Sodium (Porcine) 1,000 Units/mL Systemic      doxercalciferol (HECTOROL IV) 4 mcg. HYDROmorphone (DILAUDID) 2 mg tablet as needed. meclizine (ANTIVERT) 25 mg tablet Take 25 mg by mouth as needed. methoxy peg-epoetin beta (MIRCERA INJECTION) 30 mcg. Narcan 4 mg/actuation nasal spray       vitamin D3-folic acid 5,975 unit- 1 mg tab Take 1 Tablet by mouth.      midodrine (PROAMATINE) 2.5 mg tablet Take 1 Tablet by mouth three (3) times daily (with meals). DULoxetine (Cymbalta) 20 mg capsule Take 20 mg by mouth daily. fludrocortisone (FLORINEF) 0.1 mg tablet Take 1 Tab by mouth daily. Qty: 30 Tab, Refills: 5    Associated Diagnoses: Idiopathic hypotension      b complex vitamins (Vitamins B Complex) tablet Take 1 Tab by mouth daily. estradioL (Estrace) 0.01 % (0.1 mg/gram) vaginal cream Apply a fingertip amount around the urethra three times a week.   Qty: 30 g, Refills: 3      biotin 1,000 mcg chew Take 1 Tab by mouth daily. cyanocobalamin 1,000 mcg tablet Take 1,000 mcg by mouth daily. gabapentin (NEURONTIN) 100 mg capsule Take 100 mg by mouth nightly. AS needed      lactobacillus sp. 50 billion cpu (BIO-K PLUS) 50 billion cell -375 mg cap capsule Take 1 Cap by mouth daily. Qty: 30 Cap, Refills: 2      tamsulosin (FLOMAX) 0.4 mg capsule Take 1 Cap by mouth daily. Qty: 90 Cap, Refills: 3      sodium bicarbonate 650 mg tablet Take 2 Tabs by mouth three (3) times daily. Indications: excess body acid  Qty: 30 Tab, Refills: 1      acetaminophen (TYLENOL) 325 mg tablet Take 650 mg by mouth two (2) times a day. allopurinoL (ZYLOPRIM) 100 mg tablet Take 200 mg by mouth daily. ascorbic acid, vitamin C, (VITAMIN C) 500 mg tablet Take 500 mg by mouth daily. calcitRIOL (ROCALTROL) 0.25 mcg capsule Take 0.25 mcg by mouth daily. cholecalciferol (VITAMIN D3) (2,000 UNITS /50 MCG) cap capsule Take 2,000 Units by mouth two (2) times a day. Take two tabs a total of 4000 units      latanoprost (XALATAN) 0.005 % ophthalmic solution Administer 1 Drop to both eyes nightly. One drop at bedtime      levothyroxine (SYNTHROID) 125 mcg tablet Take 125 mcg by mouth Daily (before breakfast). omeprazole (PRILOSEC) 20 mg capsule Take 20 mg by mouth daily. ondansetron (ZOFRAN ODT) 4 mg disintegrating tablet Take 4 mg by mouth every eight (8) hours as needed for Nausea or Vomiting. vit B Cmplx 3-FA-Vit C-Biotin (NEPHRO HOWIE RX) 1- mg-mg-mcg tablet Take 1 Tab by mouth daily.              Current Facility-Administered Medications   Medication Dose Route Frequency    vancomycin (VANCOCIN) 1,000 mg in 0.9% sodium chloride 250 mL (VIAL-MATE)  1,000 mg IntraVENous DIALYSIS ONE TIME DOSE    lidocaine 4 % patch 1 Patch  1 Patch TransDERmal Q24H    gabapentin (NEURONTIN) capsule 200 mg  200 mg Oral 3 times weekly    acetaminophen (TYLENOL) tablet 650 mg  650 mg Oral Q6H PRN    levothyroxine (SYNTHROID) tablet 125 mcg  125 mcg Oral 6am    latanoprost (XALATAN) 0.005 % ophthalmic solution 1 Drop  1 Drop Both Eyes QPM    [Held by provider] amiodarone (NEXTERONE) 360 mg in dextrose 200 mL (1.8 mg/mL) infusion  0.5-1 mg/min IntraVENous TITRATE    fluconazole (DIFLUCAN) tablet 400 mg  400 mg Oral Q MON, WED & FRI    fludrocortisone (FLORINEF) tablet 0.2 mg  0.2 mg Oral DAILY    midodrine (PROAMATINE) tablet 10 mg  10 mg Oral TID WITH MEALS    epoetin caitlin-epbx (RETACRIT) injection 8,000 Units  8,000 Units SubCUTAneous Q MON, WED & FRI    famotidine (PF) (PEPCID) 20 mg in 0.9% sodium chloride 10 mL injection  20 mg IntraVENous Q24H    GENTAMICIN INFORMATION NOTE   Other Rx Dosing/Monitoring    VANCOMYCIN INFORMATION NOTE   Other Rx Dosing/Monitoring    diphenhydrAMINE-zinc acetate 2%-0.1% (BENADRYL) cream   Topical TID PRN    glucose chewable tablet 16 g  4 Tablet Oral PRN    glucagon (GLUCAGEN) injection 1 mg  1 mg IntraMUSCular PRN    dextrose (D50W) injection syrg 12.5-25 g  25-50 mL IntraVENous PRN    heparin (porcine) injection 5,000 Units  5,000 Units SubCUTAneous Q8H       Allergies: Ciprofloxacin, Cyclopentolate, Iron sucrose, Midodrine, and Statins-hmg-coa reductase inhibitors    Temp (24hrs), Av.1 °F (36.7 °C), Min:97.8 °F (36.6 °C), Max:98.6 °F (37 °C)    Visit Vitals  /76 (BP 1 Location: Right upper arm, BP Patient Position: At rest)   Pulse 66   Temp 97.9 °F (36.6 °C)   Resp 18   Ht 5' 2\" (1.575 m)   Wt 96.6 kg (213 lb)   SpO2 98%   BMI 38.96 kg/m²       ROS: 12 point ROS obtained in details. Pertinent positives as mentioned in HPI,   otherwise negative    Physical Exam:    General:  Alert, cooperative, NAD   Head:  Normocephalic, without obvious abnormality, atraumatic. Eyes:  Conjunctivae/corneas clear. Nose: Nares normal. Septum midline. Mucosa normal. No drainage or sinus tenderness.    Throat: Lips, mucosa, and tongue normal. Teeth and gums normal.   Neck: Supple, symmetrical, trachea midline, no adenopathy, no carotid bruit and no JVD. Lungs:   Symmetrical chest rise; good AE bilat; CTAB; no wheezes/rhonchi/rales noted. Heart:  RRR, S1, S2 normal   Abdomen:   Soft, non-tender. Bowel sounds normal. No masses,  No organomegaly. Extremities: Extremities normal, atraumatic, no cyanosis or edema. Pulses: 2+ and symmetric all extremities. Skin: Skin color, texture, turgor normal. Hives to R forearm   Neurologic: Grossly nonfocal   Devices: PIVs,             Labs: Results:   Chemistry Recent Labs     10/13/21  0252 10/12/21  1014 10/11/21  0535   GLU 91 134* 85    138 138   K 4.9 4.9 4.7    103 101   CO2 24 26 29   BUN 37* 28* 51*   CREA 6.43* 5.71* 8.03*   CA 8.1* 8.7 8.5   AGAP 9 9 8   BUCR 6* 5* 6*      CBC w/Diff Recent Labs     10/13/21  0252 10/12/21  1014 10/11/21  0535 10/10/21  0937 10/10/21  0937   WBC 4.9 5.4 8.0   < > 8.0   RBC 2.88* 3.17* 2.96*   < > 3.11*   HGB 8.6* 9.5* 8.8*   < > 9.2*   HCT 28.0* 30.3* 28.2*   < > 29.8*    246 245   < > 254   GRANS  --   --  58  --  69   LYMPH  --   --  27  --  18*   EOS  --   --  2  --  2    < > = values in this interval not displayed. Microbiology Recent Labs     10/10/21  1631   CULT NO GROWTH 3 DAYS          RADIOLOGY:    All available imaging studies/reports in connect care for this admission were reviewed      Disclaimer: Sections of this note are dictated utilizing voice recognition software, which may have resulted in some phonetic based errors in grammar and contents. Even though attempts were made to correct all the mistakes, some may have been missed, and remained in the body of the document. If questions arise, please contact our department.     Dr. Rajani Mckeon, Infectious Disease Specialist  [phone]  October 13, 2021  10:14 AM

## 2021-10-13 NOTE — PROGRESS NOTES
0710: Bedside shift change report given to Rogelio Navarro RN (oncoming nurse) by Jake Pulido RN (offgoing nurse). Report included the following information SBAR, Kardex, Intake/Output, MAR and Cardiac Rhythm NSR.     1315:  at bedside with patient. 1325: Palliative care at bedside with patient. 1330: Dr. Filiberto Parkinson rounding on patient.

## 2021-10-13 NOTE — PROGRESS NOTES
Reason for Renal Dosing:  Per Renal Dosing Policy    Patient clinical status and labs ordered/reviewed. Pt Weight Weight: 93.9 kg (207 lb)   Serum Creatinine Lab Results   Component Value Date/Time    Creatinine 5.71 (H) 10/12/2021 10:14 AM       Creatinine Clearance Estimated Creatinine Clearance: 8.7 mL/min (A) (based on SCr of 5.71 mg/dL (H)). BUN Lab Results   Component Value Date/Time    BUN 28 (H) 10/12/2021 10:14 AM    BUN, POC 19 (H) 09/24/2020 08:45 AM       WBC Lab Results   Component Value Date/Time    WBC 5.4 10/12/2021 10:14 AM      Temperature Temp: 98.6 °F (37 °C)   HR Pulse (Heart Rate): 82     BP BP: 119/79           Drug type: Antibiotic indicated for urinary tract infection      Drug/dose: was adjusted to : Fluconazole 800 mg po today folowed by 400 mg M, W, F (Dialysis days) after each dialysis session    Continue to monitor.     Signed Caleb Hanson, PHARMKIN  Date 10/12/2021  Time 8:53 PM

## 2021-10-13 NOTE — PROGRESS NOTES
conducted a Follow up consultation and Spiritual Assessment for [place] , who is a 64 y.o.,female. The  provided the following Interventions:  Continued the relationship of care and support. Listened empathically. Offered prayer and assurance of continued prayer on patients behalf. Chart reviewed. The following outcomes were achieved:  Patient expressed her fears and concerns about her hospitalization. We prayed together, and it brought comfort. She has strong support from her son. Assessment:  There are no further spiritual or Restorationist issues which require Spiritual Care Services interventions at this time. Plan:  Chaplains will continue to follow and will provide pastoral care on an as needed/requested basis.  recommends bedside caregivers page  on duty if patient shows signs of acute spiritual or emotional distress.      [facility]   Board Certified [address] [place] [address]   [phone]

## 2021-10-13 NOTE — PROGRESS NOTES
Discharge/Transition Planning    Noted pt changed to DNR again. Without a pt signed paper DNR/Post, DNR will again be only for this hospital admission.       Lauren Soriano RN BSN  Outcomes Manager    Pager # [phone]

## 2021-10-13 NOTE — PROGRESS NOTES
PT session held due to:  []  Nausea/vomiting  []  RN Communication/ suggestion  []  Extreme Pain  [x]  Dialysis treatment in progress. (Off unit)  Will f/u later as pt's schedule allows. Thank you.   Sharifa Platt, PTA

## 2021-10-13 NOTE — PROGRESS NOTES
Intern Progress Note  [facility]       Patient: Justina Mcgarry MRN: [id number]  CSN: [id number]    YOB: 1945  Age: 64 y.o. Sex: female    DOA: 10/8/2021 LOS:  LOS: 5 days                    Subjective:        Acute events: Patient with improved mildly improved BP and map >65 post bolus infusion and early dose of midodrine. Patient maintained stable vital signs after interventions. This morning patient endorses mild nausea, scheduled for dialysis today. Discussed in regards to palliative talk, patient noted trying to talk with them on an outpatient setting, consult placed for today. Patient notes considering stopping dialysis all together but is still thinking about it. ROS   Nausea, no chest pain, fevers, in no acute distress. Objective:     Patient Vitals for the past 24 hrs:   Temp Pulse Resp BP SpO2   10/13/21 0735 97.9 °F (36.6 °C) 66 18 119/76 98 %   10/13/21 0422 97.9 °F (36.6 °C) 67 20 (!) 116/59 99 %   10/12/21 2318 98.2 °F (36.8 °C) 68 20 (!) 99/49 98 %   10/12/21 1958 98.6 °F (37 °C) 82 20 119/79 94 %   10/12/21 1821 -- -- -- 99/66 --   10/12/21 1630 -- -- -- (!) 98/53 --   10/12/21 1527 97.8 °F (36.6 °C) 82 18 (!) 84/51 97 %   10/12/21 1356 -- 88 -- (!) 93/59 --   10/12/21 1142 98 °F (36.7 °C) 93 18 (!) 86/52 98 %         Intake/Output Summary (Last 24 hours) at 10/13/2021 0848  Last data filed at 10/12/2021 1821  Gross per 24 hour   Intake --   Output 100 ml   Net -100 ml       Physical Exam:  General: well-appearing, NAD  HEENT: Conjunctiva pink, sclera anicteric. PERRL. EOMI   CV:  RRR, no M/G/R  RESP: Unlabored breathing. Lungs CTAB  ABD:  Soft, nontender, nondistended. Neuro: A+Ox3. Ext:  No edema. 2+ radial and dp pulses bilaterally. Skin: Warm & dry.    Psych: normal mood and affect     Lab/Data Reviewed:  Recent Results (from the past 24 hour(s))   EKG, 12 LEAD, INITIAL    Collection Time: 10/12/21  9:56 AM   Result Value Ref Range    Ventricular Rate 95 BPM    Atrial Rate 95 BPM    P-R Interval 188 ms    QRS Duration 82 ms    Q-T Interval 354 ms    QTC Calculation (Bezet) 444 ms    Calculated P Axis 33 degrees    Calculated R Axis -35 degrees    Calculated T Axis 36 degrees    Diagnosis       Normal sinus rhythm  Left axis deviation  Abnormal ECG  When compared with ECG of 08-OCT-2021 15:20,  No significant change was found  Confirmed by Dilcia Zhang MD, --- ([id number]) on 10/12/2021 1:15:10 PM     MAGNESIUM    Collection Time: 10/12/21 10:14 AM   Result Value Ref Range    Magnesium 2.3 1.6 - 2.6 mg/dL   PHOSPHORUS    Collection Time: 10/12/21 10:14 AM   Result Value Ref Range    Phosphorus 5.2 (H) 2.5 - 4.9 MG/DL   METABOLIC PANEL, BASIC    Collection Time: 10/12/21 10:14 AM   Result Value Ref Range    Sodium 138 136 - 145 mmol/L    Potassium 4.9 3.5 - 5.5 mmol/L    Chloride 103 100 - 111 mmol/L    CO2 26 21 - 32 mmol/L    Anion gap 9 3.0 - 18 mmol/L    Glucose 134 (H) 74 - 99 mg/dL    BUN 28 (H) 7.0 - 18 MG/DL    Creatinine 5.71 (H) 0.6 - 1.3 MG/DL    BUN/Creatinine ratio 5 (L) 12 - 20      GFR est AA 9 (L) >60 ml/min/1.73m2    GFR est non-AA 7 (L) >60 ml/min/1.73m2    Calcium 8.7 8.5 - 10.1 MG/DL   CBC W/O DIFF    Collection Time: 10/12/21 10:14 AM   Result Value Ref Range    WBC 5.4 4.6 - 13.2 K/uL    RBC 3.17 (L) 4.20 - 5.30 M/uL    HGB 9.5 (L) 12.0 - 16.0 g/dL    HCT 30.3 (L) 35.0 - 45.0 %    MCV 95.6 78.0 - 100.0 FL    MCH 30.0 24.0 - 34.0 PG    MCHC 31.4 31.0 - 37.0 g/dL    RDW 15.7 (H) 11.6 - 14.5 %    PLATELET 906 753 - 598 K/uL    MPV 10.0 9.2 - 11.8 FL   MAGNESIUM    Collection Time: 10/13/21  2:52 AM   Result Value Ref Range    Magnesium 2.2 1.6 - 2.6 mg/dL   PHOSPHORUS    Collection Time: 10/13/21  2:52 AM   Result Value Ref Range    Phosphorus 7.1 (H) 2.5 - 4.9 MG/DL   METABOLIC PANEL, BASIC    Collection Time: 10/13/21  2:52 AM   Result Value Ref Range    Sodium 140 136 - 145 mmol/L    Potassium 4.9 3.5 - 5.5 mmol/L    Chloride 107 100 - 111 mmol/L CO2 24 21 - 32 mmol/L    Anion gap 9 3.0 - 18 mmol/L    Glucose 91 74 - 99 mg/dL    BUN 37 (H) 7.0 - 18 MG/DL    Creatinine 6.43 (H) 0.6 - 1.3 MG/DL    BUN/Creatinine ratio 6 (L) 12 - 20      GFR est AA 8 (L) >60 ml/min/1.73m2    GFR est non-AA 6 (L) >60 ml/min/1.73m2    Calcium 8.1 (L) 8.5 - 10.1 MG/DL   CBC W/O DIFF    Collection Time: 10/13/21  2:52 AM   Result Value Ref Range    WBC 4.9 4.6 - 13.2 K/uL    RBC 2.88 (L) 4.20 - 5.30 M/uL    HGB 8.6 (L) 12.0 - 16.0 g/dL    HCT 28.0 (L) 35.0 - 45.0 %    MCV 97.2 78.0 - 100.0 FL    MCH 29.9 24.0 - 34.0 PG    MCHC 30.7 (L) 31.0 - 37.0 g/dL    RDW 15.9 (H) 11.6 - 14.5 %    PLATELET 540 946 - 345 K/uL    MPV 10.7 9.2 - 11.8 FL   GENTAMICIN, RANDOM    Collection Time: 10/13/21  2:52 AM   Result Value Ref Range    Gentamicin,random 3.8 0.5 - 10 ug/ml   VANCOMYCIN, RANDOM    Collection Time: 10/13/21  2:52 AM   Result Value Ref Range    Vancomycin, random 15.5 5.0 - 40.0 UG/ML       Assessment & Plan:     64 y. o. female with PMH of ESRD on HD, chronic hypotension on midodrine, ureteral stricture s/p stent by urology, recurrent UTIs, T2DM, depression, GERD, chronic back pain presented to the ED from dialysis center after having low BPs after an HD treatment with associated lightheadedness. Admitted for hypotension requiring pressors.        afib w rbr, episodes of svt  -appreciate card recs  -continuing to monitor BP  -continuous telemetry  -amio load to restart, last day difficulty with maintaining bp and map goals, at this time discussing potential for palliative and hospice    Acute on chronic hypotension, on midodrine at home  Concern for septic shock in setting of Hx recurrent UTIs  -Pt with chronically low Bps on midodrine in OP setting recently increased to 2.5 mg TID   -pt afebrile, no leukocytosis on admission.  Lactic initially raised but has normalized. Procal 0.71.  -UA on admission positive for protein, blood, leukocyte esterase, bacteria, WBC, RBC   -BCx 10/8 positive for coagulase negative staph only 1 bottle, which may contamination  -UCx, BCx (10/10) wo growth to date  -ID, nephro consulted   -on levophed in ICU, d/c 10/11  -ECHO (11/11) EF 55-60. Normal systolic and diastolic fxn.    -current meds: midodrine 10 TID, gentamicin 70 mg, vancomycin 750 mg    -VS on exam: BP 95/36 stable off levophed. Pt is well appearing wo complaints at the time of exam. Stable for admission to step down.      Plan:   -appreciate recs per nephro, urology, ID   -c/w midodrine, fludrocortisone  - urine cx + enterococcus given po high dose fluconazole to cover for candida nivariensis  -daily BMP, CBC, Mg, Phos  -daily gentamicin, vanc levels      Anemia of chronic disease 2/2 renal disease   -Hb of 10.9, mcv 94.5  on admission. Now 8.8  -Managed by Nephro  Plan  -continue reticrit  -daily cbc      ESRD   -on dialysis MWF  -BMP: Na 138 K 4.7 BUN 51 Cr 8.03 GFR 5 Mg 2.5 Phos 6.6  -last dialyzed on 10/11     Plan:   -dialysis per nephro     Hx ureteral stricture s/p stent  -urology consulted   -renal ultrasound 10/9: showed atrophic kidneys compatible with CKD, no hydronephrosis, stent not clearly visualized      Plan:  -no intervention indicated at this time       DM   -not on any meds for DM at home   -10/8 A1c 4.6  -BS btw  on this admission     Plan:   -POC glucose as needed   -daily BMP     GERD  -continue famotidine      Chronic Back Pain 2/2 DDD  Pt reports her back pain ongoing and baseline for her. Home regimenL gabapentin 200mg after dialysis prn. Hydromorphone 2mg prn.  -hold hydromorphone in setting of low Bps  -gabapentin 200mg tid  -tylenol prn  -lidocaine patch     Depression  Home regimen - cymbalta 20mg. Per manufacture should avoid use in hemodialysis pts.  If not helping with mood suggest changing to medication   -consideration starting sertraline rather then cymbalta due to esrd      Diet regular   DVT Prophylaxis sqh   GI Prophylaxis pepcid   Code status dnr/dni Disposition tbd     Point of Contact lul  Relationship: son  [phone]      Elisa Gonsalez MD , PGY-2   [facility]   October 13, 2021, 8:48 AM

## 2021-10-13 NOTE — PROGRESS NOTES
Reason for Admission:  Septic shock ([facility] [address] [A41.9, R65.21]                 RUR Score:    27            Plan for utilizing home health:    yes                      Likelihood of Readmission:   Moderate                         Do you (patient/family) have any concerns for transition/discharge?  no    Transition of Care Plan:       Initial assessment completed with patient. Cognitive status of patient: oriented to time, place, person and situation. Face sheet information confirmed:  yes. The patient designates son to participate in her discharge plan and to receive any needed information. This patient lives in a single family home with son and other:  Daughter in law. Patient is able to navigate steps as needed. Prior to hospitalization, patient was considered to be independent with ADLs/IADLS : yes . Patient has a current ACP document on file: no      Healthcare Decision Maker:   Primary Decision Maker: Rossy Crowell - [phone]    Click here to complete [address] including selection of the Healthcare Decision Maker Relationship (ie \"Primary\")    The son will be available to transport patient home upon discharge. The patient already has [employer], 27[address] mm[address] Dimitri chair, and  medical equipment available in the home. Patient is not currently active with home health. Patient has not stayed in a skilled nursing facility or rehab. Was  stay within last 60 days : no. This patient is on dialysis :yes    If yes, hemodialysis patient and receives treatment on Monday/Wednesday/Friday at [facility] [phone]  Chair time is 5am. Pt is transported to/from dialysis by family. List of available Home Health agencies were provided and reviewed with the patient prior to discharge. Freedom of choice signed: yes, for available company. Currently, the discharge plan is Home with  [facility].     The patient states that she can obtain her medications from the pharmacy, and take her medications as directed. Patient's current insurance is [facility]      Care Management Interventions  PCP Verified by CM:  Yes ([id number])  Mode of Transport at Discharge: Self  Transition of Care Consult (CM Consult): Discharge Planning  Support Systems: Child(isaura)  Confirm Follow Up Transport: Family  The Plan for Transition of Care is Related to the Following Treatment Goals : home  Discharge Location  Discharge Placement: Home

## 2021-10-13 NOTE — PROGRESS NOTES
Problem: Self Care Deficits Care Plan (Adult)  Goal: *Acute Goals and Plan of Care (Insert Text)  Description: Occupational Therapy Goals  Initiated 10/13/2021 within 7 day(s). 1.  Patient will perform grooming with modified independence. 2.  Patient will perform bathing with modified independence. 3.  Patient will perform upper body dressing and lower body dressing with modified independence. 4.  Patient will perform toilet transfers with modified independence using RW. 5.  Patient will perform all aspects of toileting with modified independence. 6.  Patient will participate in upper extremity therapeutic exercise/activities with modified independence for 7 minutes. 7.  Patient will utilize energy conservation techniques during functional activities with min verbal cues. Prior Level of Function: Mod I with ADLs and functional mobility using RW     Outcome: Progressing Towards Goal     OCCUPATIONAL THERAPY EVALUATION    Patient: Ugo Reza [de-identified]64 y.o. female)  Date: 10/13/2021  Primary Diagnosis: Septic shock ([facility] [address] [A41.9, R65.21]       Precautions:  Fall, Contact  PLOF: Mod I with ADLs and functional mobility using RW    ASSESSMENT :  Nursing/RN cleared for pt to participate in OT evaluation and tx session. Patient presents seated edge of bed performing sponge bath, nursing requesting pt to cease bath d/t elevated HR in 190s. Pt instructed to lay supine, performed with Supv. Following rest break, pt performing oral hygiene seated upright in bed, appears anxious and rushing task, HR elevated to 140s, vc's for rest break, HR WNL, instruction provided for rest breaks and pacing self for emphasis for energy conservation techniques. Functional transfer e.g. bedside commode transfer using RW deferred d/t elevated HR with activity.  Patient resting comfortably lying semi reclined in bed, call bell within reach & pt verbalized understanding and provided return demonstration to utilize for assist e.g. functional transfers in order to prevent falls, bed alarm intact. Patient will benefit from skilled intervention to address the above impairments. Patient's rehabilitation potential is considered to be Fair  Factors which may influence rehabilitation potential include:   []             None noted  []             Mental ability/status  [x]             Medical condition  []             Home/family situation and support systems  []             Safety awareness  []             Pain tolerance/management  []             Other:      PLAN :  Recommendations and Planned Interventions:   [x]               Self Care Training                  [x]      Therapeutic Activities  [x]               Functional Mobility Training   []      Cognitive Retraining  [x]               Therapeutic Exercises           [x]      Endurance Activities  [x]               Balance Training                    [x]      Neuromuscular Re-Education  []               Visual/Perceptual Training     [x]      Home Safety Training  [x]               Patient Education                   [x]      Family Training/Education  []               Other (comment):    Frequency/Duration: Patient will be followed by occupational therapy 1-2 times per day/4-7 days per week to address goals. Discharge Recommendations: Rehab vs Home health with increased assist from family e.g. ADLs, IADLs  Further Equipment Recommendations for Discharge: bedside commode and rolling walker     SUBJECTIVE:   Patient stated [place] had dialysis today.     OBJECTIVE DATA SUMMARY:     Past Medical History:   Diagnosis Date    Acidosis     Anemia     Arteriovenous fistula (HCC)     Chronic kidney disease     on HD at [facility] on [address] on MWF. [phone]    Chronic pain     CKD (chronic kidney disease)     Diabetes ([facility] [address]     no meds now    ESRD (end stage renal disease) on dialysis ([facility] [address] 9/9/2021    HLD (hyperlipidemia)     HTN (hypertension)     Hyperparathyroidism due to renal insufficiency (HCC)     Hypothyroid     Kidney stone     Lung mass     Recurrent UTI     Ureter, stricture     Uric acid nephrolithiasis     Urinary incontinence      Past Surgical History:   Procedure Laterality Date    HX APPENDECTOMY      HX CHOLECYSTECTOMY      HX GASTRIC BYPASS      Gastric stapling    HX KNEE ARTHROSCOPY      HX UROLOGICAL      right PCN placement    HX UROLOGICAL  07/23/2018    RIGHT URETEROSCOPY WITH HOLMIUM LASER    IR EXCHANGE NEPHRO PERC LT SI  2/21/2020    IR EXCHANGE NEPHRO PERC RT SI  4/13/2020    IR EXCHANGE NEPHRO PERC RT SI  7/17/2020    IR NEPHROSTOMY PERC RT PLC CATH  SI  10/14/2020    IR NEPHROURETERAL PERC RT PLC CATH NEW ACCESS  SI  4/30/2020    ID INTRO CATH DIALYSIS CIRCUIT DX ANGRPH FLUOR S&I Left 9/24/2020    FISTULOGRAM LEFT/poss permanent catheter placement performed by Tucker Cantor MD at [facility] CATH LAB    VASCULAR SURGERY PROCEDURE UNLIST      lef AVF     Barriers to Learning/Limitations: None  Compensate with: visual, verbal, tactile, kinesthetic cues/model    Home Situation:   Home Situation  Home Environment: Private residence  # Steps to Enter: 3  Rails to Enter: Yes  One/Two Story Residence: Two story  Lift Chair Available: Yes  Living Alone: No  Support Systems: Child(isaura)  Patient Expects to be Discharged to[de-identified] House  Current DME Used/Available at Home: [employer], rolling, Shower chair, Grab bars  Tub or Shower Type: Tub/Shower combination  []  Right hand dominant   []  Left hand dominant    Cognitive/Behavioral Status:  Neurologic State: Alert  Orientation Level: Oriented X4  Cognition: Follows commands  Safety/Judgement: Fall prevention    Skin: appears intact    Edema: none noted    Vision/Perceptual:  appears intact, able to tell correct time on wall clock       Coordination: BUE  Coordination: Generally decreased, functional  Fine Motor Skills-Upper: Left Intact; Right Intact    Gross Motor Skills-Upper: Left Intact; Right Intact (RUE shoulder flex/abd impaired)    Balance:  Sitting: Intact    Strength: BUE  Strength: Generally decreased, functional    Tone & Sensation: BUE  Tone: Normal  Sensation: Intact     Range of Motion: BUE  AROM: Within functional limits (RUE shoulder flex/abd impaired)  PROM: Within functional limits (RUE shoulder flex/abd)        Functional Mobility and Transfers for ADLs:  Bed Mobility: sit to supine w/ Supv    ADL Assessment:   Feeding: Modified independent    Oral Facial Hygiene/Grooming: Stand-by assistance    Bathing: Stand-by assistance    Upper Body Dressing: Stand-by assistance    Lower Body Dressing: Stand-by assistance    Toileting: Contact guard assistance    ADL Intervention:       Grooming  Position Performed:  (semi-reclined in bed)  Brushing Teeth: Stand-by assistance    Lower Body Dressing Assistance  Socks: Stand-by assistance  Leg Crossed Method Used: Yes  Position Performed: Long sitting on bed    Cognitive Retraining  Safety/Judgement: Fall prevention    Pain:  Pain level pre-treatment: 0/10   Pain level post-treatment: 0/10     Activity Tolerance:   fair  Please refer to the flowsheet for vital signs taken during this treatment. After treatment:   [] Patient left in no apparent distress sitting up in chair  [x] Patient left in no apparent distress in bed  [x] Call bell left within reach  [x] Nursing notified  [] Caregiver present  [x] Bed alarm activated    COMMUNICATION/EDUCATION:   [x] Role of Occupational Therapy in the acute care setting  [x] Home safety education was provided and the patient/caregiver indicated understanding. [x] Patient/family have participated as able in goal setting and plan of care. [x] Patient/family agree to work toward stated goals and plan of care. [] Patient understands intent and goals of therapy, but is neutral about his/her participation. [] Patient is unable to participate in goal setting and plan of care.     Thank you for this referral.  Agnieszka Troy Yusuf  Time Calculation: 13 mins    Eval Complexity: History: MEDIUM Complexity : Expanded review of history including physical, cognitive and psychosocial  history ; Examination: MEDIUM Complexity : 3-5 performance deficits relating to physical, cognitive , or psychosocial skils that result in activity limitations and / or participation restrictions; Decision Making:MEDIUM Complexity : Patient may present with comorbidities that affect occupational performnce.  Miniml to moderate modification of tasks or assistance (eg, physical or verbal ) with assesment(s) is necessary to enable patient to complete evaluation

## 2021-10-13 NOTE — PROGRESS NOTES
Palliative Medicine    Palliative Medicine team Stan Rodriguez NP and Fidela Sandifer RN met at the pt's bedside. Pt was sitting up on the side of the bed and was washing her face. She has just returned from HD. States that she has been on HD for approximately 1.5 years and that it has been very hard on her. She stated, \"It takes a lot out of me. I have been considering stopping HD but I want to talk to my son first.\" Discussed benefits and burdens of CPR and intubation. Pt confirmed that she wishes to remain DNR/DNI status. Introduced the POST form to the pt and explained that this medical order would protect her medical wishes when she leaves the hospital. She is unsure as to whether she wishes to continue dialysis or stop treatment. She wants to discuss with her son first. Is agreeable to completing the POST form tomorrow after she has had time to talk to her son and make a decision regarding dialysis. Telephone call made to [facility] clinic. They state that they have a DDNR on file for the pt. Requested a copy to be faxed over to the Palliative office. Pt remains DNR/DNI. Potential dispo plan will be home with her son when medically stable. Thank you for the Palliative Medicine consult and allowing us to participate in the care of Ms. Julius Martinez. Will continue to monitor and provide support.     Fidela Sandifer RN, BSN  Palliative Medicine Inpatient RN  [facility]. [facility]  Palliative COPE Line: [phone])

## 2021-10-13 NOTE — CONSULTS
Palliative Medicine Consult    Patient Name: Nicole Borges  YOB: 1945    Date of Initial Consult: 10/13/2021  Reason for Consult: Goals of care discussions  Requesting Provider: Dr. Dorothy Jacob  Primary Care Physician: Fidel Alcocer MD      SUMMARY:   Nicole Borges is a 64 y.o. with a past history of ESRD on HD, chronic hypotension on midodrine, ureteral stricture s/p stent by urology, recurrent UTIs, and T2DM, who was admitted on 10/8/2021 from home with a diagnosis of hypotension requiring pressor support, septic shock, lactic acidosis, and ESRD on HD. Current medical issues leading to Palliative Medicine involvement include: support and goals of care discussions. PALLIATIVE DIAGNOSES:   1. Goals of care discussions  2. Hypotension  3. End-stage renal disease on dialysis  4. Advanced age/debility       PLAN:   1. Goals of care discussions: Palliative medicine team including Emir Xiao RN and I met with patient at patient's bedside. Patient is awake, alert, sitting up on the side of the bed, just had a visit with the . Introduced our role as palliative medicine, confirmed patient's CODE STATUS is DNR/DNI. Patient admits she is having serious thoughts and whether to continue hemodialysis or stop. She understands that stopping dialysis would likely result in a shorter life expectancy, and would be best supported with the support of hospice. She admits she wants \"more time\" but she is recognizing that her body is having difficulty tolerating dialysis due to hypotension. Patient would like to talk to her son tonight, and regroup with our team tomorrow. Offered to have a family meeting with him present at bedside, patient states \"it is my decision, I will talk to him tonight and will talk to you tomorrow\". We will follow up tomorrow. At this time patient is a DNR/DNI. 2. Hypotension: Arrived with septic shock, required pressor support.  Now with paroxysmal atrial tachycardiac cannot rule out atrial fibrillation/flutter. Cardiology following, difficult to treat due to hypotension. 3. ESRD on dialysis: went to dialysis today. Patient is thinking whether she wants to continue or stop dialysis. 4. Advanced age/debility: 64year old female who needs assist with functional ADLs. 5. Initial consult note routed to primary continuity provider  6.  Communicated plan of care with: Palliative IDT       GOALS OF CARE / TREATMENT PREFERENCES:   [====Goals of Care====]  GOALS OF CARE: DNR/DNI, limited interventions, no feeding tubes  Patient/Health Care Proxy Stated Goals: Prolong life      TREATMENT PREFERENCES:   Code Status: DNR    Advance Care Planning:  Advance Care Planning 10/11/2021   Patient's Healthcare Decision Maker is: -   Confirm Advance Directive None   Patient Would Like to Complete Advance Directive No   Does the patient have other document types -       Medical Interventions: Limited additional interventions    Artificially Administered Nutrition: No feeding tube      The palliative care team has discussed with patient / health care proxy about goals of care / treatment preferences for patient.  [====Goals of Care====]         HISTORY:     History obtained from: patient, chart    CHIEF COMPLAINT: fatigue, doesn't know if she wants to continue dialysis    HPI/SUBJECTIVE:    The patient is:   [x] Verbal and participatory  [] Non-participatory due to:   Oriented x 4     Clinical Pain Assessment (nonverbal scale for severity on nonverbal patients):   Clinical Pain Assessment  Severity: 0            FUNCTIONAL ASSESSMENT:     Palliative Performance Scale (PPS):  PPS: 60       PSYCHOSOCIAL/SPIRITUAL SCREENING:     Advance Care Planning:  Advance Care Planning 10/11/2021   Patient's Healthcare Decision Maker is: -   Confirm Advance Directive None   Patient Would Like to Complete Advance Directive No   Does the patient have other document types -        Any spiritual / Bahai concerns:  [] Yes /  [x] No    Caregiver Burnout:  [] Yes /  [] No /  [x] No Caregiver Present      Anticipatory grief assessment:   [x] Normal  / [] Maladaptive              REVIEW OF SYSTEMS:     Positive and pertinent negative findings in ROS are noted above in HPI. The following systems were [x] reviewed / [] unable to be reviewed as noted in HPI  Other findings are noted below. Systems: constitutional, ears/nose/mouth/throat, respiratory, gastrointestinal, genitourinary, musculoskeletal, integumentary, neurologic, psychiatric, endocrine. Positive findings noted below. Modified ESAS Completed by: provider   Fatigue: 5     Depression: 0 Pain: 0   Anxiety: 0 Nausea: 0     Dyspnea: 0     Constipation: No     Stool Occurrence(s): 1        PHYSICAL EXAM:     From RN flowsheet:  Wt Readings from Last 3 Encounters:   10/12/21 96.6 kg (213 lb)   10/05/21 94.3 kg (208 lb)   09/21/21 93 kg (205 lb 0.4 oz)     Blood pressure 122/70, pulse 76, temperature 98.1 °F (36.7 °C), resp. rate 18, height 5' 2\" (1.575 m), weight 96.6 kg (213 lb), SpO2 96 %.     Pain Scale 1: Numeric (0 - 10)  Pain Intensity 1: 8     Pain Location 1: Back  Pain Orientation 1: Lower  Pain Description 1: Aching  Pain Intervention(s) 1: Medication (see MAR)  Last bowel movement, if known: 10/12/2021    Constitutional: Awake, alert, appears chronically ill, sitting up on the edge of the bed in NAD  Eyes: pupils equal, anicteric  ENMT: no nasal discharge, moist mucous membranes  Cardiovascular: regular rhythm, distal pulses intact  Respiratory: breathing not labored, symmetric  Gastrointestinal: soft non-tender, +bowel sounds  Musculoskeletal: no deformity, no tenderness to palpation  Skin: warm, dry  Neurologic: following commands, moving all extremities, oriented x 4  Psychiatric: full affect,appropriately tearful at times, no hallucinations         HISTORY:     Principal Problem:    Hypotension (5/15/2020)    Active Problems:    Septic shock (HCC) (10/8/2021)      Past Medical History:   Diagnosis Date    Acidosis     Anemia     Arteriovenous fistula (HCC)     Chronic kidney disease     on HD at [facility] on [address] on MWF. [phone]    Chronic pain     CKD (chronic kidney disease)     Diabetes ([facility] [address]     no meds now    ESRD (end stage renal disease) on dialysis ([facility] [address] 9/9/2021    HLD (hyperlipidemia)     HTN (hypertension)     Hyperparathyroidism due to renal insufficiency ([facility] [address]     Hypothyroid     Kidney stone     Lung mass     Recurrent UTI     Ureter, stricture     Uric acid nephrolithiasis     Urinary incontinence       Past Surgical History:   Procedure Laterality Date    HX APPENDECTOMY      HX CHOLECYSTECTOMY      HX GASTRIC BYPASS      Gastric stapling    HX KNEE ARTHROSCOPY      HX UROLOGICAL      right PCN placement    HX UROLOGICAL  07/23/2018    RIGHT URETEROSCOPY WITH HOLMIUM LASER    IR EXCHANGE NEPHRO PERC LT SI  2/21/2020    IR EXCHANGE NEPHRO PERC RT SI  4/13/2020    IR EXCHANGE NEPHRO PERC RT SI  7/17/2020    IR NEPHROSTOMY PERC RT PLC CATH  SI  10/14/2020    IR NEPHROURETERAL PERC RT PLC CATH NEW ACCESS  SI  4/30/2020    CO INTRO CATH DIALYSIS CIRCUIT DX ANGRPH FLUOR S&I Left 9/24/2020    FISTULOGRAM LEFT/poss permanent catheter placement performed by Juan M Conway MD at [facility] CATH LAB    VASCULAR SURGERY PROCEDURE UNLIST      lef AVF      Family History   Problem Relation Age of Onset    Heart Surgery Sister       History reviewed, no pertinent family history.   Social History     Tobacco Use    Smoking status: Never Smoker    Smokeless tobacco: Never Used   Substance Use Topics    Alcohol use: Never     Allergies   Allergen Reactions    Ciprofloxacin Hives    Cyclopentolate Unknown (comments)    Iron Sucrose Diarrhea    Midodrine Unknown (comments)     Denies 910/1/21    Statins-Hmg-Coa Reductase Inhibitors Other (comments)     Body ache      Current Facility-Administered Medications Medication Dose Route Frequency    ondansetron (ZOFRAN) injection 4 mg  4 mg IntraVENous Q6H PRN    lidocaine 4 % patch 1 Patch  1 Patch TransDERmal Q24H    gabapentin (NEURONTIN) capsule 200 mg  200 mg Oral 3 times weekly    acetaminophen (TYLENOL) tablet 650 mg  650 mg Oral Q6H PRN    levothyroxine (SYNTHROID) tablet 125 mcg  125 mcg Oral 6am    latanoprost (XALATAN) 0.005 % ophthalmic solution 1 Drop  1 Drop Both Eyes QPM    fluconazole (DIFLUCAN) tablet 400 mg  400 mg Oral Q MON, WED & FRI    fludrocortisone (FLORINEF) tablet 0.2 mg  0.2 mg Oral DAILY    midodrine (PROAMATINE) tablet 10 mg  10 mg Oral TID WITH MEALS    epoetin caitlin-epbx (RETACRIT) injection 8,000 Units  8,000 Units SubCUTAneous Q MON, WED & FRI    famotidine (PF) (PEPCID) 20 mg in 0.9% sodium chloride 10 mL injection  20 mg IntraVENous Q24H    diphenhydrAMINE-zinc acetate 2%-0.1% (BENADRYL) cream   Topical TID PRN    glucose chewable tablet 16 g  4 Tablet Oral PRN    glucagon (GLUCAGEN) injection 1 mg  1 mg IntraMUSCular PRN    dextrose (D50W) injection syrg 12.5-25 g  25-50 mL IntraVENous PRN    heparin (porcine) injection 5,000 Units  5,000 Units SubCUTAneous Q8H          LAB AND IMAGING FINDINGS:     Lab Results   Component Value Date/Time    WBC 4.9 10/13/2021 02:52 AM    HGB 8.6 (L) 10/13/2021 02:52 AM    PLATELET 651 35/15/9642 02:52 AM     Lab Results   Component Value Date/Time    Sodium 140 10/13/2021 02:52 AM    Potassium 4.9 10/13/2021 02:52 AM    Chloride 107 10/13/2021 02:52 AM    CO2 24 10/13/2021 02:52 AM    BUN 37 (H) 10/13/2021 02:52 AM    Creatinine 6.43 (H) 10/13/2021 02:52 AM    Calcium 8.1 (L) 10/13/2021 02:52 AM    Magnesium 2.2 10/13/2021 02:52 AM    Phosphorus 7.1 (H) 10/13/2021 02:52 AM      Lab Results   Component Value Date/Time    Alk.  phosphatase 139 (H) 10/08/2021 03:50 PM    Protein, total 7.9 10/08/2021 03:50 PM    Albumin 3.1 (L) 10/08/2021 03:50 PM    Globulin 4.8 (H) 10/08/2021 03:50 PM Lab Results   Component Value Date/Time    INR 1.1 10/08/2021 03:50 PM    Prothrombin time 13.6 10/08/2021 03:50 PM    aPTT 35.4 10/14/2020 11:56 AM      Lab Results   Component Value Date/Time    Iron 72 05/08/2020 02:30 PM    TIBC 263 05/08/2020 02:30 PM    Iron % saturation 27 05/08/2020 02:30 PM    Ferritin 70 05/08/2020 02:30 PM      No results found for: PH, PCO2, PO2  No components found for: GLPOC   No results found for: CPK, CKMB             Total time: 50 minutes  Counseling / coordination time, spent as noted above:   > 50% counseling / coordination?:yes     Prolonged service was provided for  []30 min   []75 min in face to face time in the presence of the patient, spent as noted above. Time Start:   Time End:   Note: this can only be billed with 00612 (initial) or 84492 (follow up). If multiple start / stop times, list each separately.

## 2021-10-14 ENCOUNTER — HOME HEALTH ADMISSION (OUTPATIENT)
Dept: HOME HEALTH SERVICES | Facility: HOME HEALTH | Age: 76
End: 2021-10-14
Payer: MEDICARE

## 2021-10-14 VITALS
TEMPERATURE: 98.2 F | SYSTOLIC BLOOD PRESSURE: 126 MMHG | HEIGHT: 62 IN | WEIGHT: 212.9 LBS | RESPIRATION RATE: 18 BRPM | DIASTOLIC BLOOD PRESSURE: 62 MMHG | HEART RATE: 68 BPM | OXYGEN SATURATION: 98 % | BODY MASS INDEX: 39.18 KG/M2

## 2021-10-14 LAB
ANION GAP SERPL CALC-SCNC: 7 MMOL/L (ref 3–18)
BACTERIA SPEC CULT: ABNORMAL
BACTERIA SPEC CULT: ABNORMAL
BACTERIA SPEC CULT: NORMAL
BUN SERPL-MCNC: 20 MG/DL (ref 7–18)
BUN/CREAT SERPL: 4 (ref 12–20)
CALCIUM SERPL-MCNC: 8.5 MG/DL (ref 8.5–10.1)
CC UR VC: ABNORMAL
CHLORIDE SERPL-SCNC: 108 MMOL/L (ref 100–111)
CO2 SERPL-SCNC: 26 MMOL/L (ref 21–32)
CREAT SERPL-MCNC: 4.78 MG/DL (ref 0.6–1.3)
ERYTHROCYTE [DISTWIDTH] IN BLOOD BY AUTOMATED COUNT: 15.9 % (ref 11.6–14.5)
GLUCOSE SERPL-MCNC: 76 MG/DL (ref 74–99)
HCT VFR BLD AUTO: 27.1 % (ref 35–45)
HGB BLD-MCNC: 8.2 G/DL (ref 12–16)
MAGNESIUM SERPL-MCNC: 2.3 MG/DL (ref 1.6–2.6)
MCH RBC QN AUTO: 29.4 PG (ref 24–34)
MCHC RBC AUTO-ENTMCNC: 30.3 G/DL (ref 31–37)
MCV RBC AUTO: 97.1 FL (ref 78–100)
PHOSPHATE SERPL-MCNC: 5.6 MG/DL (ref 2.5–4.9)
PLATELET # BLD AUTO: 269 K/UL (ref 135–420)
PMV BLD AUTO: 10 FL (ref 9.2–11.8)
POTASSIUM SERPL-SCNC: 4.2 MMOL/L (ref 3.5–5.5)
RBC # BLD AUTO: 2.79 M/UL (ref 4.2–5.3)
SERVICE CMNT-IMP: ABNORMAL
SERVICE CMNT-IMP: NORMAL
SODIUM SERPL-SCNC: 141 MMOL/L (ref 136–145)
WBC # BLD AUTO: 5.6 K/UL (ref 4.6–13.2)

## 2021-10-14 PROCEDURE — 83735 ASSAY OF MAGNESIUM: CPT

## 2021-10-14 PROCEDURE — 97530 THERAPEUTIC ACTIVITIES: CPT

## 2021-10-14 PROCEDURE — 36415 COLL VENOUS BLD VENIPUNCTURE: CPT

## 2021-10-14 PROCEDURE — 99232 SBSQ HOSP IP/OBS MODERATE 35: CPT | Performed by: INTERNAL MEDICINE

## 2021-10-14 PROCEDURE — 99233 SBSQ HOSP IP/OBS HIGH 50: CPT | Performed by: NURSE PRACTITIONER

## 2021-10-14 PROCEDURE — 74011000250 HC RX REV CODE- 250: Performed by: PHYSICIAN ASSISTANT

## 2021-10-14 PROCEDURE — 74011250636 HC RX REV CODE- 250/636: Performed by: PHYSICIAN ASSISTANT

## 2021-10-14 PROCEDURE — 80048 BASIC METABOLIC PNL TOTAL CA: CPT

## 2021-10-14 PROCEDURE — 84100 ASSAY OF PHOSPHORUS: CPT

## 2021-10-14 PROCEDURE — 85027 COMPLETE CBC AUTOMATED: CPT

## 2021-10-14 PROCEDURE — 74011250637 HC RX REV CODE- 250/637: Performed by: INTERNAL MEDICINE

## 2021-10-14 PROCEDURE — 97535 SELF CARE MNGMENT TRAINING: CPT

## 2021-10-14 PROCEDURE — 74011250636 HC RX REV CODE- 250/636: Performed by: INTERNAL MEDICINE

## 2021-10-14 PROCEDURE — 74011250637 HC RX REV CODE- 250/637: Performed by: PHYSICIAN ASSISTANT

## 2021-10-14 RX ORDER — LIDOCAINE 4 G/100G
PATCH TOPICAL
Qty: 10 EACH | Refills: 0 | Status: SHIPPED | OUTPATIENT
Start: 2021-10-14 | End: 2021-11-19

## 2021-10-14 RX ORDER — AMOXICILLIN AND CLAVULANATE POTASSIUM 500; 125 MG/1; MG/1
1 TABLET, FILM COATED ORAL EVERY 24 HOURS
Qty: 4 TABLET | Refills: 0 | Status: SHIPPED | OUTPATIENT
Start: 2021-10-14 | End: 2021-10-18

## 2021-10-14 RX ORDER — FLUCONAZOLE 200 MG/1
400 TABLET ORAL
Qty: 4 TABLET | Refills: 0 | Status: SHIPPED | OUTPATIENT
Start: 2021-10-15 | End: 2021-10-19

## 2021-10-14 RX ORDER — GABAPENTIN 100 MG/1
200 CAPSULE ORAL 3 TIMES WEEKLY
Qty: 15 CAPSULE | Refills: 0 | Status: SHIPPED | OUTPATIENT
Start: 2021-10-15 | End: 2022-05-27

## 2021-10-14 RX ORDER — MIDODRINE HYDROCHLORIDE 10 MG/1
10 TABLET ORAL
Qty: 90 TABLET | Refills: 0 | Status: ON HOLD | OUTPATIENT
Start: 2021-10-14 | End: 2021-11-19

## 2021-10-14 RX ORDER — FLUDROCORTISONE ACETATE 0.1 MG/1
0.2 TABLET ORAL DAILY
Qty: 60 TABLET | Refills: 0 | Status: SHIPPED | OUTPATIENT
Start: 2021-10-15 | End: 2021-11-19

## 2021-10-14 RX ADMIN — MIDODRINE HYDROCHLORIDE 10 MG: 5 TABLET ORAL at 09:36

## 2021-10-14 RX ADMIN — FAMOTIDINE 20 MG: 10 INJECTION INTRAVENOUS at 09:37

## 2021-10-14 RX ADMIN — ONDANSETRON 4 MG: 2 INJECTION INTRAMUSCULAR; INTRAVENOUS at 00:36

## 2021-10-14 RX ADMIN — APIXABAN 5 MG: 5 TABLET, FILM COATED ORAL at 09:36

## 2021-10-14 RX ADMIN — ONDANSETRON 4 MG: 2 INJECTION INTRAMUSCULAR; INTRAVENOUS at 09:37

## 2021-10-14 RX ADMIN — FLUDROCORTISONE ACETATE 0.2 MG: 0.1 TABLET ORAL at 09:35

## 2021-10-14 NOTE — PROGRESS NOTES
Intern Progress Note  [facility]       Patient: Ugo Reza MRN: [id number]  CSN: [id number]    YOB: 1945  Age: 64 y.o. Sex: female    DOA: 10/8/2021 LOS:  LOS: 6 days                    Subjective:        Acute events: Patient evaluated by cardiology, notes starting eliquis and cleared for discharge. Patient without acute events overnight. This morning patient awake and alert, notes mild nausea prior to eating breakfast. Otherwise patient was wondering when she would be able to go home. Cardiology cleared, urology has cleared, ID noted oral abx with regiment in place. Patient wants to continue with dialysis, will inspect post interaction with PT/OT today ambulating and potential discharge for today. ROS   Notes mild nausea, no vomiting, no chest pain or palpitations.      Objective:     Patient Vitals for the past 24 hrs:   Temp Pulse Resp BP SpO2   10/14/21 0735 98.1 °F (36.7 °C) 78 18 (!) 116/57 98 %   10/14/21 0329 98.1 °F (36.7 °C) 74 16 (!) 103/55 96 %   10/14/21 0032 98 °F (36.7 °C) 70 17 (!) 107/50 98 %   10/13/21 2045 97.9 °F (36.6 °C) 68 -- (!) 104/58 --   10/13/21 1527 99 °F (37.2 °C) 93 18 117/62 95 %   10/13/21 1250 98.1 °F (36.7 °C) 76 18 122/70 96 %   10/13/21 1225 98 °F (36.7 °C) 74 18 120/67 --   10/13/21 1215 -- 66 -- (!) 132/57 --   10/13/21 1200 -- 81 -- 125/75 --   10/13/21 1145 -- (!) 57 -- 101/73 --   10/13/21 1130 -- (!) 59 -- 112/88 --   10/13/21 1115 -- (!) 56 -- (!) 119/59 --         Intake/Output Summary (Last 24 hours) at 10/14/2021 1114  Last data filed at 10/13/2021 1225  Gross per 24 hour   Intake --   Output 500 ml   Net -500 ml       Physical Exam:  General:  alert, cooperative, no distress, appears stated age  Neck:  no JVD  Lungs:  clear to auscultation bilaterally  Heart:  regular rate and rhythm  Abdomen:  abdomen is soft without significant tenderness, masses, organomegaly or guarding  Extremities:  extremities normal, atraumatic, no cyanosis or edema    Lab/Data Reviewed:  Recent Results (from the past 24 hour(s))   MAGNESIUM    Collection Time: 10/14/21  1:36 AM   Result Value Ref Range    Magnesium 2.3 1.6 - 2.6 mg/dL   PHOSPHORUS    Collection Time: 10/14/21  1:36 AM   Result Value Ref Range    Phosphorus 5.6 (H) 2.5 - 4.9 MG/DL   CBC W/O DIFF    Collection Time: 10/14/21  1:36 AM   Result Value Ref Range    WBC 5.6 4.6 - 13.2 K/uL    RBC 2.79 (L) 4.20 - 5.30 M/uL    HGB 8.2 (L) 12.0 - 16.0 g/dL    HCT 27.1 (L) 35.0 - 45.0 %    MCV 97.1 78.0 - 100.0 FL    MCH 29.4 24.0 - 34.0 PG    MCHC 30.3 (L) 31.0 - 37.0 g/dL    RDW 15.9 (H) 11.6 - 14.5 %    PLATELET 526 177 - 367 K/uL    MPV 10.0 9.2 - 41.3 FL   METABOLIC PANEL, BASIC    Collection Time: 10/14/21  1:36 AM   Result Value Ref Range    Sodium 141 136 - 145 mmol/L    Potassium 4.2 3.5 - 5.5 mmol/L    Chloride 108 100 - 111 mmol/L    CO2 26 21 - 32 mmol/L    Anion gap 7 3.0 - 18 mmol/L    Glucose 76 74 - 99 mg/dL    BUN 20 (H) 7.0 - 18 MG/DL    Creatinine 4.78 (H) 0.6 - 1.3 MG/DL    BUN/Creatinine ratio 4 (L) 12 - 20      GFR est AA 11 (L) >60 ml/min/1.73m2    GFR est non-AA 9 (L) >60 ml/min/1.73m2    Calcium 8.5 8.5 - 10.1 MG/DL       Assessment & Plan:     64 y. o. female with PMH of ESRD on HD, chronic hypotension on midodrine, ureteral stricture s/p stent by urology, recurrent UTIs, T2DM, depression, GERD, chronic back pain presented to the ED from dialysis center after having low BPs after an HD treatment with associated lightheadedness.  Admitted for hypotension requiring pressors.         afib w rbr, episodes of svt-stable  -appreciate card recs  -continuing to monitor BP  -continuous telemetry  -cards cleared for discharge, notes starting eliquis      Acute on chronic hypotension, on midodrine at home  Concern for septic shock in setting of Hx recurrent UTIs  -Pt with chronically low Bps on midodrine in OP setting recently increased to 2.5 mg TID   -pt afebrile, no leukocytosis on admission. Lactic initially raised but has normalized. Procal 0.71.  -UA on admission positive for protein, blood, leukocyte esterase, bacteria, WBC, RBC   -BCx 10/8 positive for coagulase negative staph only 1 bottle, which may contamination  -UCx, BCx (10/10) wo growth to date  -ID, nephro consulted   -on levophed in ICU, d/c 10/11  -ECHO (11/11) EF 55-60. Normal systolic and diastolic fxn.    -current meds: midodrine 10 TID, gentamicin 70 mg, vancomycin 750 mg    -VS on exam: BP 95/36 stable off levophed. Pt is well appearing wo complaints at the time of exam. Stable for admission to step down.      Plan:   -appreciate recs per nephro, urology, ID   -c/w midodrine, fludrocortisone  - urine cx + enterococcus given po high dose fluconazole to cover for candida nivariensis  -daily BMP, CBC, Mg, Phos  -to go home on diflucan and augmentin    Anemia of chronic disease 2/2 renal disease   -Hb of 10.9, mcv 94.5  on admission. Now 8.8  -Managed by Nephro  Plan  -continue reticrit  -daily cbc      ESRD   -on dialysis MWF  -BMP: Na 138 K 4.7 BUN 51 Cr 8.03 GFR 5 Mg 2.5 Phos 6.6  -last dialyzed on 10/11     Plan:   -dialysis per nephro     Hx ureteral stricture s/p stent  -urology consulted   -renal ultrasound 10/9: showed atrophic kidneys compatible with CKD, no hydronephrosis, stent not clearly visualized      Plan:  -no intervention indicated at this time       DM   -not on any meds for DM at home   -10/8 A1c 4.6  -BS btw  on this admission     Plan:   -POC glucose as needed   -daily BMP     GERD  -continue famotidine      Chronic Back Pain 2/2 DDD  Pt reports her back pain ongoing and baseline for her. Home regimenL gabapentin 200mg after dialysis prn. Hydromorphone 2mg prn.  -hold hydromorphone in setting of low Bps  -gabapentin 200mg tid  -tylenol prn  -lidocaine patch     Depression  Home regimen - cymbalta 20mg. Per manufacture should avoid use in hemodialysis pts.  If not helping with mood suggest changing to medication   -consideration starting sertraline rather then cymbalta due to esrd      Diet regular   DVT Prophylaxis eliquis   GI Prophylaxis pepcid   Code status dnr/dni   Disposition Home with      Point of Contact lul  Relationship: son  [phone]      Jose Flores MD , PGY-2   [facility]   October 14, 2021, 11:14 AM normal...

## 2021-10-14 NOTE — PROGRESS NOTES
Problem: Self Care Deficits Care Plan (Adult)  Goal: *Acute Goals and Plan of Care (Insert Text)  Description: Occupational Therapy Goals  Initiated 10/13/2021 within 7 day(s). 1.  Patient will perform grooming with modified independence. 2.  Patient will perform bathing with modified independence. 3.  Patient will perform upper body dressing and lower body dressing with modified independence. 4.  Patient will perform toilet transfers with modified independence using RW. 5.  Patient will perform all aspects of toileting with modified independence. 6.  Patient will participate in upper extremity therapeutic exercise/activities with modified independence for 7 minutes. 7.  Patient will utilize energy conservation techniques during functional activities with min verbal cues. Prior Level of Function: Mod I with ADLs and functional mobility using RW     Outcome: Progressing Towards Goal   OCCUPATIONAL THERAPY TREATMENT    Patient: Jonna Salvadorean [de-identified]64 y.o. female)  Date: 10/14/2021  Diagnosis: Septic shock ([facility] [address] [A41.9, R65.21] Hypotension       Precautions: Fall, Contact  PLOF: Mod I with ADLs and functional mobility using RW    Chart, occupational therapy assessment, plan of care, and goals were reviewed. ASSESSMENT:  Pt cleared by RN for OT tx at this time. Pt presented supine in bed upon therapist arrival and motivated to work with OT. Pt's HR assessed at rest 68 bpm. She transitioned to EOB w/ Supervision in prep for functional task. Pt demo LB dressing donning socks with Supervision utilizing leg cross technique, no LOB noted. Pt was provided education on EC/WS techniques to increase (I) and safety during all ADLs. STS transfer SBA and pivoted to [facility] with CGA. Pt reported she did not need to void at this time. Pt maneuvered into BR ~ 8 ft w/ RW CGA and stood to perform oral care with SBA utilizing 1 UE support.  Pt returned to sitting EOB and HR assessed at 86 bpm. She returned back to supine with Supervision and was positioned for comfort. Pt left with HOB elevated, bed alarm active, and all needs left within reach. RN made aware of pt's participation and position. Progression toward goals:  [x]          Improving appropriately and progressing toward goals  []          Improving slowly and progressing toward goals  []          Not making progress toward goals and plan of care will be adjusted     PLAN:  Patient continues to benefit from skilled intervention to address the above impairments. Continue treatment per established plan of care. Discharge Recommendations: Home health with increased assist from family   Further Equipment Recommendations for Discharge:  RW, [facility]     SUBJECTIVE:   Patient stated  I am doing better today. \"     OBJECTIVE DATA SUMMARY:   Cognitive/Behavioral Status:  Neurologic State: Alert  Orientation Level: Oriented X4  Cognition: Follows commands  Safety/Judgement: Fall prevention    Functional Mobility and Transfers for ADLs:   Bed Mobility:     Supine to Sit: Supervision  Sit to Supine: Supervision  Scooting: Supervision   Transfers:  Sit to Stand: Stand-by assistance  Stand to Sit: Supervision      Toilet Transfer : Contact guard assistance ([facility] to [facility])           Bathroom Mobility: Contact guard assistance (w/ RW)        Balance:  Sitting: Intact  Standing: Impaired; With support    ADL Intervention:       Grooming  Position Performed: Standing  Brushing Teeth: Stand-by assistance      Lower Body Dressing Assistance  Pants With Button/Zipper: Supervision  Leg Crossed Method Used: Yes  Position Performed: Seated edge of bed         Cognitive Retraining  Safety/Judgement: Fall prevention    Pain:  Pain level pre-treatment: 0/10   Pain level post-treatment: 0/10    Activity Tolerance:    Fair   Please refer to the flowsheet for vital signs taken during this treatment.   After treatment:   []  Patient left in no apparent distress sitting up in chair  [x]  Patient left in no apparent distress in bed  [x]  Call bell left within reach  [x]  Nursing notified  []  Caregiver present  [x]  Bed alarm activated    COMMUNICATION/EDUCATION:   [x] Role of Occupational Therapy in the acute care setting  [x] Home safety education was provided and the patient/caregiver indicated understanding. [x] Patient/family have participated as able in working towards goals and plan of care. [x] Patient/family agree to work toward stated goals and plan of care. [] Patient understands intent and goals of therapy, but is neutral about his/her participation. [] Patient is unable to participate in goal setting and plan of care.       Thank you for this referral.  JUAQUIN Hairston  Time Calculation: 26 mins

## 2021-10-14 NOTE — PROGRESS NOTES
Cardiology Progress Note    Admit Date: 10/8/2021  Attending Cardiologist: Dr. Cynthia Ponce:     Hospital Problems  Date Reviewed: 9/21/2021        Codes Class Noted POA    Septic shock ([facility] [address] ICD-10-CM: A41.9, R65.21  ICD-9-CM: 038.9, 785.52, 995.92  10/8/2021 Unknown        * (Principal) Hypotension ICD-10-CM: I95.9  ICD-9-CM: 458.9  5/15/2020 Yes              -SVT appears to have underlying paroxysmal atrial flutter initially with elevated rates up to 180s with any exertion. Suspect exacerbated by acute illness. Rhythm stable s/p IVF. IV amio was never started due to low BP. -Sepsis with recurrent UTIs. Presented from HD center with hypotension. Admitted with sepsis requiring pressor support. Improving.  -Hypotension in setting of sepsis requiring pressor support now improved. H/o hypotension on midodrine as outpatient.  -Ureteral stricture s/p stent exchange 10/5.  -Diabetes mellitus. -ESRD on HD. -Hypothyroidism on synthroid.  -Chronic anemia.  -Chronic back pain. -Depression.  -Normal EF 55-60% by echo 10/2021.  -Low risk nuclear stress 11/2020.  -DNR.     Primary cardiologist Dr. Vashti Anguiano. Plan:     Seen and evaluated. Agree with below. She remained stable cardiac rhythm wise. We will be available as needed. Thank you. Rhythm remains stable off AV rubina medications. BP remains stable on midodrine and florinef. Anticoagulation with Eliquis started. Continue work up and treatment of acute illness. No further cardiac work up at this time. Subjective:     No new complaints.     Objective:      Patient Vitals for the past 8 hrs:   Temp Pulse Resp BP SpO2   10/14/21 0735 98.1 °F (36.7 °C) 78 18 (!) 116/57 98 %   10/14/21 0329 98.1 °F (36.7 °C) 74 16 (!) 103/55 96 %         Patient Vitals for the past 96 hrs:   Weight   10/14/21 0032 212 lb 14.4 oz (96.6 kg)   10/12/21 1958 213 lb (96.6 kg)   10/10/21 1110 207 lb (93.9 kg)       TELE: normal sinus rhythm vs atypical atrial flutter Current Facility-Administered Medications   Medication Dose Route Frequency Last Admin    ondansetron (ZOFRAN) injection 4 mg  4 mg IntraVENous Q6H PRN 4 mg at 10/14/21 0937    apixaban (ELIQUIS) tablet 5 mg  5 mg Oral BID 5 mg at 10/14/21 0936    amoxicillin-clavulanate (AUGMENTIN) 500-125 mg per tablet 1 Tablet  1 Tablet Oral Q24H 1 Tablet at 10/13/21 2140    lidocaine 4 % patch 1 Patch  1 Patch TransDERmal Q24H 1 Patch at 10/13/21 0155    gabapentin (NEURONTIN) capsule 200 mg  200 mg Oral 3 times weekly 200 mg at 10/13/21 1727    acetaminophen (TYLENOL) tablet 650 mg  650 mg Oral Q6H  mg at 10/13/21 2120    levothyroxine (SYNTHROID) tablet 125 mcg  125 mcg Oral 6am 125 mcg at 10/13/21 0534    latanoprost (XALATAN) 0.005 % ophthalmic solution 1 Drop  1 Drop Both Eyes QPM 1 Drop at 10/13/21 2151    fluconazole (DIFLUCAN) tablet 400 mg  400 mg Oral Q MON, WED &  mg at 10/13/21 2120    fludrocortisone (FLORINEF) tablet 0.2 mg  0.2 mg Oral DAILY 0.2 mg at 10/14/21 0935    midodrine (PROAMATINE) tablet 10 mg  10 mg Oral TID WITH MEALS 10 mg at 10/14/21 0936    epoetin caitlin-epbx (RETACRIT) injection 8,000 Units  8,000 Units SubCUTAneous Q MON, WED & FRI 8,000 Units at 10/13/21 2141    famotidine (PF) (PEPCID) 20 mg in 0.9% sodium chloride 10 mL injection  20 mg IntraVENous Q24H 20 mg at 10/14/21 9025    diphenhydrAMINE-zinc acetate 2%-0.1% (BENADRYL) cream   Topical TID PRN      glucose chewable tablet 16 g  4 Tablet Oral PRN      glucagon (GLUCAGEN) injection 1 mg  1 mg IntraMUSCular PRN      dextrose (D50W) injection syrg 12.5-25 g  25-50 mL IntraVENous PRN           Intake/Output Summary (Last 24 hours) at 10/14/2021 1046  Last data filed at 10/13/2021 1225  Gross per 24 hour   Intake --   Output 500 ml   Net -500 ml       Physical Exam:  General:  alert, cooperative, no distress, appears stated age  Neck:  no JVD  Lungs:  clear to auscultation bilaterally  Heart:  regular rate and rhythm  Abdomen:  abdomen is soft without significant tenderness, masses, organomegaly or guarding  Extremities:  extremities normal, atraumatic, no cyanosis or edema    Visit Vitals  BP (!) 116/57 (BP 1 Location: Right upper arm, BP Patient Position: At rest)   Pulse 78   Temp 98.1 °F (36.7 °C)   Resp 18   Ht 5' 2\" (1.575 m)   Wt 212 lb 14.4 oz (96.6 kg)   SpO2 98%   BMI 38.94 kg/m²       Data Review:     Labs: Results:       Chemistry Recent Labs     10/14/21  0136 10/13/21  0252 10/12/21  1014   GLU 76 91 134*    140 138   K 4.2 4.9 4.9    107 103   CO2 26 24 26   BUN 20* 37* 28*   CREA 4.78* 6.43* 5.71*   CA 8.5 8.1* 8.7   MG 2.3 2.2 2.3   PHOS 5.6* 7.1* 5.2*   AGAP 7 9 9   BUCR 4* 6* 5*      CBC w/Diff Recent Labs     10/14/21  0136 10/13/21  0252 10/12/21  1014   WBC 5.6 4.9 5.4   RBC 2.79* 2.88* 3.17*   HGB 8.2* 8.6* 9.5*   HCT 27.1* 28.0* 30.3*    253 246      Cardiac Enzymes No results found for: CPK, CK, CKMMB, CKMB, RCK3, CKMBT, CKNDX, CKND1, PATTI, TROPT, TROIQ, GRAHAM, TROPT, TNIPOC, BNP, BNPP   Coagulation No results for input(s): PTP, INR, APTT, INREXT in the last 72 hours. Lipid Panel No results found for: CHOL, CHOLPOCT, CHOLX, CHLST, CHOLV, 929277, HDL, HDLP, LDL, LDLC, DLDLP, 950343, VLDLC, VLDL, TGLX, TRIGL, TRIGP, TGLPOCT, CHHD, CHHDX   BNP No results found for: BNP, BNPP, XBNPT   Liver Enzymes No results for input(s): TP, ALB, TBIL, AP in the last 72 hours.     No lab exists for component: SGOT, GPT, DBIL   Thyroid Studies Lab Results   Component Value Date/Time    TSH 2.50 10/08/2021 03:50 PM          Signed By: NOVA Da Silva     October 14, 2021

## 2021-10-14 NOTE — PROGRESS NOTES
Problem: Falls - Risk of  Goal: *Absence of Falls  Description: Document Caren Michelle Fall Risk and appropriate interventions in the flowsheet. Outcome: Progressing Towards Goal  Note: Fall Risk Interventions:  Mobility Interventions: Assess mobility with egress test, Bed/chair exit alarm         Medication Interventions: Bed/chair exit alarm, Patient to call before getting OOB, Teach patient to arise slowly    Elimination Interventions: Bed/chair exit alarm, Call light in reach    History of Falls Interventions: Bed/chair exit alarm, Room close to nurse's station, Utilize gait belt for transfer/ambulation, Door open when patient unattended         Problem: Patient Education: Go to Patient Education Activity  Goal: Patient/Family Education  Outcome: Progressing Towards Goal     Problem: Injury - Risk of, Adverse Drug Event  Goal: *Absence of adverse drug events  Outcome: Progressing Towards Goal  Goal: *Absence of medication errors  Outcome: Progressing Towards Goal  Goal: *Knowledge of prescribed medications  Outcome: Progressing Towards Goal     Problem: Patient Education: Go to Patient Education Activity  Goal: Patient/Family Education  Outcome: Progressing Towards Goal     Problem: Falls - Risk of  Goal: *Absence of Falls  Description: Document Caren Michelle Fall Risk and appropriate interventions in the flowsheet.   Outcome: Progressing Towards Goal  Note: Fall Risk Interventions:  Mobility Interventions: Assess mobility with egress test, Bed/chair exit alarm         Medication Interventions: Bed/chair exit alarm, Patient to call before getting OOB, Teach patient to arise slowly    Elimination Interventions: Bed/chair exit alarm, Call light in reach    History of Falls Interventions: Bed/chair exit alarm, Room close to nurse's station, Utilize gait belt for transfer/ambulation, Door open when patient unattended         Problem: Patient Education: Go to Patient Education Activity  Goal: Patient/Family Education  Outcome: Progressing Towards Goal     Problem: Pain  Goal: *Control of Pain  Outcome: Progressing Towards Goal  Goal: *PALLIATIVE CARE:  Alleviation of Pain  Outcome: Progressing Towards Goal     Problem: Patient Education: Go to Patient Education Activity  Goal: Patient/Family Education  Outcome: Progressing Towards Goal     Problem: Patient Education: Go to Patient Education Activity  Goal: Patient/Family Education  Outcome: Progressing Towards Goal     Problem: Sepsis: Day of Diagnosis  Goal: Off Pathway (Use only if patient is Off Pathway)  Outcome: Progressing Towards Goal  Goal: *Fluid resuscitation  Outcome: Progressing Towards Goal  Goal: *Paired blood cultures prior to first dose of antibiotic  Outcome: Progressing Towards Goal  Goal: *First dose of  appropriate antibiotic within 3 hours of arrival to ED, within 1 hour of arrival to ICU  Outcome: Progressing Towards Goal  Goal: *Lactic acid with first set of blood cultures  Outcome: Progressing Towards Goal  Goal: *Pneumococcal immunization (if eligible)  Outcome: Progressing Towards Goal  Goal: *Influenza immunization (if eligible)  Outcome: Progressing Towards Goal  Goal: Activity/Safety  Outcome: Progressing Towards Goal  Goal: Consults, if ordered  Outcome: Progressing Towards Goal  Goal: Diagnostic Test/Procedures  Outcome: Progressing Towards Goal  Goal: Nutrition/Diet  Outcome: Progressing Towards Goal  Goal: Discharge Planning  Outcome: Progressing Towards Goal  Goal: Medications  Outcome: Progressing Towards Goal  Goal: Respiratory  Outcome: Progressing Towards Goal  Goal: Treatments/Interventions/Procedures  Outcome: Progressing Towards Goal  Goal: Psychosocial  Outcome: Progressing Towards Goal     Problem: Sepsis: Day 2  Goal: Off Pathway (Use only if patient is Off Pathway)  Outcome: Progressing Towards Goal  Goal: *Central Venous Pressure maintained at 8-12 mm Hg  Outcome: Progressing Towards Goal  Goal: *Hemodynamically stable  Outcome: Progressing Towards Goal  Goal: *Tolerating diet  Outcome: Progressing Towards Goal  Goal: Activity/Safety  Outcome: Progressing Towards Goal  Goal: Consults, if ordered  Outcome: Progressing Towards Goal  Goal: Diagnostic Test/Procedures  Outcome: Progressing Towards Goal  Goal: Nutrition/Diet  Outcome: Progressing Towards Goal  Goal: Discharge Planning  Outcome: Progressing Towards Goal  Goal: Medications  Outcome: Progressing Towards Goal  Goal: Respiratory  Outcome: Progressing Towards Goal  Goal: Treatments/Interventions/Procedures  Outcome: Progressing Towards Goal  Goal: Psychosocial  Outcome: Progressing Towards Goal     Problem: Sepsis: Day 3  Goal: Off Pathway (Use only if patient is Off Pathway)  Outcome: Progressing Towards Goal  Goal: *Central Venous Pressure maintained at 8-12 mm Hg  Outcome: Progressing Towards Goal  Goal: *Oxygen saturation within defined limits  Outcome: Progressing Towards Goal  Goal: *Vital sign stability  Outcome: Progressing Towards Goal  Goal: *Tolerating diet  Outcome: Progressing Towards Goal  Goal: *Demonstrates progressive activity  Outcome: Progressing Towards Goal  Goal: Activity/Safety  Outcome: Progressing Towards Goal  Goal: Consults, if ordered  Outcome: Progressing Towards Goal  Goal: Diagnostic Test/Procedures  Outcome: Progressing Towards Goal  Goal: Nutrition/Diet  Outcome: Progressing Towards Goal  Goal: Discharge Planning  Outcome: Progressing Towards Goal  Goal: Medications  Outcome: Progressing Towards Goal  Goal: Respiratory  Outcome: Progressing Towards Goal  Goal: Treatments/Interventions/Procedures  Outcome: Progressing Towards Goal  Goal: Psychosocial  Outcome: Progressing Towards Goal     Problem: Sepsis: Day 4  Goal: Off Pathway (Use only if patient is Off Pathway)  Outcome: Progressing Towards Goal  Goal: Activity/Safety  Outcome: Progressing Towards Goal  Goal: Consults, if ordered  Outcome: Progressing Towards Goal  Goal: Diagnostic Test/Procedures  Outcome: Progressing Towards Goal  Goal: Nutrition/Diet  Outcome: Progressing Towards Goal  Goal: Discharge Planning  Outcome: Progressing Towards Goal  Goal: Medications  Outcome: Progressing Towards Goal  Goal: Respiratory  Outcome: Progressing Towards Goal  Goal: Treatments/Interventions/Procedures  Outcome: Progressing Towards Goal  Goal: Psychosocial  Outcome: Progressing Towards Goal  Goal: *Oxygen saturation within defined limits  Outcome: Progressing Towards Goal  Goal: *Hemodynamically stable  Outcome: Progressing Towards Goal  Goal: *Vital signs within defined limits  Outcome: Progressing Towards Goal  Goal: *Tolerating diet  Outcome: Progressing Towards Goal  Goal: *Demonstrates progressive activity  Outcome: Progressing Towards Goal  Goal: *Fluid volume maintenance  Outcome: Progressing Towards Goal     Problem: Sepsis: Day 5  Goal: Off Pathway (Use only if patient is Off Pathway)  Outcome: Progressing Towards Goal  Goal: *Oxygen saturation within defined limits  Outcome: Progressing Towards Goal  Goal: *Vital signs within defined limits  Outcome: Progressing Towards Goal  Goal: *Tolerating diet  Outcome: Progressing Towards Goal  Goal: *Demonstrates progressive activity  Outcome: Progressing Towards Goal  Goal: *Discharge plan identified  Outcome: Progressing Towards Goal  Goal: Activity/Safety  Outcome: Progressing Towards Goal  Goal: Consults, if ordered  Outcome: Progressing Towards Goal  Goal: Diagnostic Test/Procedures  Outcome: Progressing Towards Goal  Goal: Nutrition/Diet  Outcome: Progressing Towards Goal  Goal: Discharge Planning  Outcome: Progressing Towards Goal  Goal: Medications  Outcome: Progressing Towards Goal  Goal: Respiratory  Outcome: Progressing Towards Goal  Goal: Treatments/Interventions/Procedures  Outcome: Progressing Towards Goal  Goal: Psychosocial  Outcome: Progressing Towards Goal     Problem: Sepsis: Day 6  Goal: Off Pathway (Use only if patient is Off Pathway)  Outcome: Progressing Towards Goal  Goal: *Oxygen saturation within defined limits  Outcome: Progressing Towards Goal  Goal: *Vital signs within defined limits  Outcome: Progressing Towards Goal  Goal: *Tolerating diet  Outcome: Progressing Towards Goal  Goal: *Demonstrates progressive activity  Outcome: Progressing Towards Goal  Goal: Influenza immunization  Outcome: Progressing Towards Goal  Goal: *Pneumococcal immunization  Outcome: Progressing Towards Goal  Goal: Activity/Safety  Outcome: Progressing Towards Goal  Goal: Diagnostic Test/Procedures  Outcome: Progressing Towards Goal  Goal: Nutrition/Diet  Outcome: Progressing Towards Goal  Goal: Discharge Planning  Outcome: Progressing Towards Goal  Goal: Medications  Outcome: Progressing Towards Goal  Goal: Respiratory  Outcome: Progressing Towards Goal  Goal: Treatments/Interventions/Procedures  Outcome: Progressing Towards Goal  Goal: Psychosocial  Outcome: Progressing Towards Goal     Problem: Sepsis: Discharge Outcomes  Goal: *Vital signs within defined limits  Outcome: Progressing Towards Goal  Goal: *Tolerating diet  Outcome: Progressing Towards Goal  Goal: *Verbalizes understanding and describes prescribed diet  Outcome: Progressing Towards Goal  Goal: *Demonstrates progressive activity  Outcome: Progressing Towards Goal  Goal: *Describes follow-up/return visits to physicians  Outcome: Progressing Towards Goal  Goal: *Verbalizes name, dosage, time, side effects, and number of days to continue medications  Outcome: Progressing Towards Goal  Goal: *Influenza immunization (Oct-Mar only)  Outcome: Progressing Towards Goal  Goal: *Pneumococcal immunization  Outcome: Progressing Towards Goal  Goal: *Lungs clear or at baseline  Outcome: Progressing Towards Goal  Goal: *Oxygen saturation returns to baseline or 90% or better on room air  Outcome: Progressing Towards Goal  Goal: *Glycemic control  Outcome: Progressing Towards Goal  Goal: *Absence of deep venous thrombosis signs and symptoms(Stroke Metric)  Outcome: Progressing Towards Goal  Goal: *Describes available resources and support systems  Outcome: Progressing Towards Goal  Goal: *Optimal pain control at patient's stated goal  Outcome: Progressing Towards Goal

## 2021-10-14 NOTE — ACP (ADVANCE CARE PLANNING)
Palliative Medicine    [facility] (Physician Orders for Scope of Treatment)       Date of conversation: 10/14/2021  Location: [facility]  Length (minutes): 45    Participants:   [x] Patient     Advanced Steps® ACP Facilitator: Rosa Caruso RN      Conversation Topics   (If Patient does not have decision making capacity, Agent/Surrogate responds based on understanding of how patient would respond if capable)    Understanding of Medical Condition/s AND Potential Complications:    Patient response: Pt is a former nurse. She is very clear that she does not want resuscitation or intubation for any reason. She has previously completed a DDNR which we have a copy of. Pt has decided that she would like to continue with HD. She did inquire about what end of life would look like if she chose to stop dialysis. While she is not ready to stop dialysis at this time, she understands that when her body is no longer able to tolerate that we would bring on the support of hospice to help manage her end of life symptoms. Pt is agreeable to completing a POST form. POST form denotes DDNR status, limited medical interventions (NO intubation under any circumstances, ok with CPAP/BiPAP, avoid ICU if possible) and NO feeding tubes. Pt also chose to complete an AMD. The AMD names her son Thong Rios as primary mPOA and her daughter-in-law Neeraj Soriano as secondary mPOA. Both forms were placed on the chart for scanning into EMR. Three copies of each document were also provided to the pt for their records.      Needs to discuss with spiritual/Sabianism advisor: [] Yes  [x] No    Needs more information about illness and complications:  [] Yes  [x] No      Cardiopulmonary Resuscitation        Order Elected for CPR:  []  Attempt Resuscitation [x]  Do Not Attempt Resuscitation      When NOT in Cardiopulmonary Arrest, Order Elected:      [] Comfort Measures  [x] Limited Additional Interventions  [] Full Interventions    Artificially Administered Nutrition, Order Elected:    [x] No Feeding Tube   [] Feeding Tube for a defined trial period  [] Feeding Tube long-term if indicated    The following was provided (check all that apply):      Healthcare Decision Information Cards:   [] Help with Breathing Facts   [] Tube Feeding Facts   [] CPR Facts      [] Review of existing Advance Directive  [x] Assistance with Completion of New Advance Directive   [x] Review of [place] POST Form       Meeting Outcomes:   [x] ACP discussion completed   [x] [facility]  [x] [place] prepared for Provider review and signature   [x] Original placed on Chart, if in facility (form to be sent with patient at discharge)  [x] Copy given to healthcare agent    [] Copy scanned to electronic medical record     Follow-up plan:     [] Schedule follow-up conversation to continue planning   [x] Referred individual to Provider for additional questions/concerns   [x] Advised patient/agent/surrogate to review completed POST form and update if needed with changes in condition, patient preferences or care setting     [] This note routed to one or more involved healthcare providers    Pt remains DNR/DNI. POST completed and AMD completed. Potential dispo plan is home to son's house when medically stable. Thank you for the Palliative Medicine consult and allowing us to participate in the care of Ms. Devon Saab. Will continue to monitor and provide support.     Raven Potts RN, BSN  Palliative Medicine Inpatient RN  [facility]  Palliative COPE Line: [phone])

## 2021-10-14 NOTE — PROGRESS NOTES
RENAL DAILY PROGRESS NOTE    Patient: Aashish Dang               Sex: female          DOA: 10/8/2021  2:50 PM        YOB: 1945      Age:  64 y.o.        LOS:  LOS: 6 days     Subjective:     Aashish Dang is a 64 y.o.  who presents with Septic shock ([facility] [address] [A41.9, R65.21].    Asked to evaluate for esrd,admitted with hypotension,hx of reccurent uti,s/p ureteral stent exchange few days ago  Chief complains: Patient denies nausea, vomiting, chest pain, dizziness, shortness of breath or headache.  - Reviewed last 24 hrs events     Current Facility-Administered Medications   Medication Dose Route Frequency    ondansetron (ZOFRAN) injection 4 mg  4 mg IntraVENous Q6H PRN    apixaban (ELIQUIS) tablet 5 mg  5 mg Oral BID    amoxicillin-clavulanate (AUGMENTIN) 500-125 mg per tablet 1 Tablet  1 Tablet Oral Q24H    lidocaine 4 % patch 1 Patch  1 Patch TransDERmal Q24H    gabapentin (NEURONTIN) capsule 200 mg  200 mg Oral 3 times weekly    acetaminophen (TYLENOL) tablet 650 mg  650 mg Oral Q6H PRN    levothyroxine (SYNTHROID) tablet 125 mcg  125 mcg Oral 6am    latanoprost (XALATAN) 0.005 % ophthalmic solution 1 Drop  1 Drop Both Eyes QPM    fluconazole (DIFLUCAN) tablet 400 mg  400 mg Oral Q MON, WED & FRI    fludrocortisone (FLORINEF) tablet 0.2 mg  0.2 mg Oral DAILY    midodrine (PROAMATINE) tablet 10 mg  10 mg Oral TID WITH MEALS    epoetin caitlin-epbx (RETACRIT) injection 8,000 Units  8,000 Units SubCUTAneous Q MON, WED & FRI    famotidine (PF) (PEPCID) 20 mg in 0.9% sodium chloride 10 mL injection  20 mg IntraVENous Q24H    diphenhydrAMINE-zinc acetate 2%-0.1% (BENADRYL) cream   Topical TID PRN    glucose chewable tablet 16 g  4 Tablet Oral PRN    glucagon (GLUCAGEN) injection 1 mg  1 mg IntraMUSCular PRN    dextrose (D50W) injection syrg 12.5-25 g  25-50 mL IntraVENous PRN       Objective:     Visit Vitals  /62 (BP 1 Location: Right upper arm, BP Patient Position: At rest)   Pulse 68   Temp 98.2 °F (36.8 °C)   Resp 18   Ht 5' 2\" (1.575 m)   Wt 96.6 kg (212 lb 14.4 oz)   SpO2 98%   BMI 38.94 kg/m²     No intake or output data in the 24 hours ending 10/14/21 1324    Physical Examination:     GEN: AAO X 3,  RS: Chest is bilateral equal, no wheezing / rales / crackles  CVS: S1-S2 heard,   Abdomen: Soft, Non tender,   Extremities: No edema,   CNS: Awake & follows commands,   HEENT: Head is atraumatic, PERRLA, conjunctiva pink & non icteric. No JVD or carotid bruit      Data Review:      Labs:     Hematology:   Recent Labs     10/14/21  0136 10/13/21  0252 10/12/21  1014   WBC 5.6 4.9 5.4   HGB 8.2* 8.6* 9.5*   HCT 27.1* 28.0* 30.3*     Chemistry:   Recent Labs     10/14/21  0136 10/13/21  0252 10/12/21  1014   BUN 20* 37* 28*   CREA 4.78* 6.43* 5.71*   CA 8.5 8.1* 8.7   K 4.2 4.9 4.9    140 138    107 103   CO2 26 24 26   PHOS 5.6* 7.1* 5.2*   GLU 76 91 134*        Images:    XR (Most Recent). CXR reviewed by me and compared with previous CXR Results from Hospital Encounter encounter on 10/08/21    XR CHEST PORT    Narrative  Portable Chest    CPT CODE: 28913    HISTORY: Line placements. FINDINGS:    Compared with study done earlier the same day at 1521 hours. Right internal jugular line tip at the proximal SVC. Additional wires overlie  the patient. Surgical clips at the epigastrium. Ectasia of the descending  thoracic aorta elevated diaphragm on the right stable. No pneumothorax, effusion  or new lung consolidation. Impression  Right CVL tip at the proximal SVC. No pneumothorax. CT (Most Recent) Results from Hospital Encounter encounter on 10/08/21    CT HEAD WO CONT    Narrative  CT Head without contrast    HISTORY: Hypotension, dizziness. COMPARISON: None    TECHNIQUE:  Axial imaging from the skull base to the vertex was performed  without intravenous contrast.  Coronal and sagittal reformations.   All CT scans  are performed using dose optimization techniques as appropriate to the performed  exam including the following: Automated exposure control, adjustment of mA  and/or kV according to patient size, and use of iterative reconstructive  technique. FINDINGS:    No intracranial hemorrhage. No evidence of midline shift, mass effect, or  obvious mass lesion. There is preservation of the gray and white matter  differentiation, no acute infarct (Please note that CT is less sensitive in the  detection of early infarct). Moderately severe periventricular white matter changes of the cerebrum. The size of the ventricles and sulci are normal.  No extra axial fluid  collections. Heavily calcified intracranial carotid arteries. Visualized paranasal sinuses are clear. No evidence for skull fracture. Impression  No CT finding for acute territorial infarct or acute intracranial hemorrhage. Fairly extensive periventricular white matter change. Nonspecific but often  associated with chronic microvascular ischemic disease. EKG No results found for this or any previous visit. I have personally reviewed the old medical records and patient's labs    Plan / Recommendation:      1. Esrd,tolerated dialysis well yesterday  2.hypotension on midodrine,and florinef. cortisol level is normal  3.anemia,give epo with dialysis    D/w     Nellie Connolly MD  Nephrology  10/14/2021

## 2021-10-14 NOTE — HOME CARE
Received home health referral for [facility] for (SN, PT, OT). Spoke with patient's son Loc Everett via phone;  patient identifiers verified. Explained home care services and routines. Demographics verified including insurance, phone and address confirmed. Patient has the following DME: already has BSC, and walker available for use. Caregivers available lives with son and daughter in law. Orders noted and arranged to be processed to central intake.   --  Ambrose Flower LPN  [facility]

## 2021-10-14 NOTE — PROGRESS NOTES
Infectious Disease progress Note        Reason: septic shock, cystitis    Current abx Prior abx       Amoxicillin/clavulanate since 10/13/21 Ceftriaxone 10/5  Cefepime 10/10-10/11  Meropenem 10/8-10/9  Vancomycin, gentamicin since 10/9-10/13     Lines:       Assessment :    66 y. o. 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      E.coli bloodstream infection in 3/2019 (pan susceptible e.coli)     Acinetobacter UTI in 4/2019 -  (intermediately susceptible to ceftriaxone, ceftazidime, pip/tazo)     Hospitalization 2/2020 for  early hemorrhagic cystitis - Right PCNL. No hydronephrosis noted on Ct scan 2/20/20. +nt edema of bladder c/w cystitis. Urine culture 2/20/20-greater than 100,000 colonies of mixed gram-positive mike including 20,000 staph aureus- MSSA.      S/P PCNL exchange on 2/21/20. Urology help appreciated.      urine culture 4/13/20 positive for >100,000 colonies of strep. Bovis   urine cx 2/19/21- >100,000 colonies of pseudomonas    S/p ciprofloxacin 9/28-10/4  Ureteral stricture S/P stent change 10/5  Urine cx 10/5/21- no growth    Clinical presentation c/w septic shock due to partially treated uti, complicated UTI  Single positive blood cx for coagulase negative staph on 10/8 likely contamination    Negative repeat blood cultures 10/10. Improved suprapubic discomfort. Urine culture collected 10/9/2021 enterococcus faecalis (vancomycin resistant), candida nivariensis  Since patient is symptomatic, had recent urological intervention, risk of complicated candida UTI, will treat    Clinically better. Resolved suprapubic pain. Improved blood pressure. Patient has documented h/o ciprofloxacin allergy.  She has tolerated ciprofloxacin on multiple occasions in the past.      Recommendations   -continue p.o. amoxicillin/clavulanate till 10/18/2021. Cont. hiigh dose po fluconazole to cover for candida nivariensis till 10/19/21  -f/u urology recommendations   - remove right IJ CVC- d/w RN  -Discharge planning per primary team    Above plan was discussed in details with patient, primary team. Please call me if any further questions or concerns. Will continue to participate in the care of this patient. HPI:         Patient states that she feels  better today. She has noted improvement in her urinary urgency, suprapubic discomfort. Current Discharge Medication List      CONTINUE these medications which have NOT CHANGED    Details   vitamin B complex (B COMPLEX VITAMINS PO) Take 1 Tablet by mouth daily. heparin sod,porcine/0.9 % NaCl (heparin flush, porcine, in ns) 100 unit/mL kit Heparin Sodium (Porcine) 1,000 Units/mL Systemic      doxercalciferol (HECTOROL IV) 4 mcg. HYDROmorphone (DILAUDID) 2 mg tablet as needed. meclizine (ANTIVERT) 25 mg tablet Take 25 mg by mouth as needed. methoxy peg-epoetin beta (MIRCERA INJECTION) 30 mcg. Narcan 4 mg/actuation nasal spray       vitamin D3-folic acid 7,083 unit- 1 mg tab Take 1 Tablet by mouth.      midodrine (PROAMATINE) 2.5 mg tablet Take 1 Tablet by mouth three (3) times daily (with meals). DULoxetine (Cymbalta) 20 mg capsule Take 20 mg by mouth daily. fludrocortisone (FLORINEF) 0.1 mg tablet Take 1 Tab by mouth daily. Qty: 30 Tab, Refills: 5    Associated Diagnoses: Idiopathic hypotension      b complex vitamins (Vitamins B Complex) tablet Take 1 Tab by mouth daily. estradioL (Estrace) 0.01 % (0.1 mg/gram) vaginal cream Apply a fingertip amount around the urethra three times a week. Qty: 30 g, Refills: 3      biotin 1,000 mcg chew Take 1 Tab by mouth daily. cyanocobalamin 1,000 mcg tablet Take 1,000 mcg by mouth daily.       gabapentin (NEURONTIN) 100 mg capsule Take 100 mg by mouth nightly. AS needed      lactobacillus sp. 50 billion cpu (BIO-K PLUS) 50 billion cell -375 mg cap capsule Take 1 Cap by mouth daily. Qty: 30 Cap, Refills: 2      tamsulosin (FLOMAX) 0.4 mg capsule Take 1 Cap by mouth daily. Qty: 90 Cap, Refills: 3      sodium bicarbonate 650 mg tablet Take 2 Tabs by mouth three (3) times daily. Indications: excess body acid  Qty: 30 Tab, Refills: 1      acetaminophen (TYLENOL) 325 mg tablet Take 650 mg by mouth two (2) times a day. allopurinoL (ZYLOPRIM) 100 mg tablet Take 200 mg by mouth daily. ascorbic acid, vitamin C, (VITAMIN C) 500 mg tablet Take 500 mg by mouth daily. calcitRIOL (ROCALTROL) 0.25 mcg capsule Take 0.25 mcg by mouth daily. cholecalciferol (VITAMIN D3) (2,000 UNITS /50 MCG) cap capsule Take 2,000 Units by mouth two (2) times a day. Take two tabs a total of 4000 units      latanoprost (XALATAN) 0.005 % ophthalmic solution Administer 1 Drop to both eyes nightly. One drop at bedtime      levothyroxine (SYNTHROID) 125 mcg tablet Take 125 mcg by mouth Daily (before breakfast). omeprazole (PRILOSEC) 20 mg capsule Take 20 mg by mouth daily. ondansetron (ZOFRAN ODT) 4 mg disintegrating tablet Take 4 mg by mouth every eight (8) hours as needed for Nausea or Vomiting. vit B Cmplx 3-FA-Vit C-Biotin (NEPHRO HOWIE RX) 1- mg-mg-mcg tablet Take 1 Tab by mouth daily.              Current Facility-Administered Medications   Medication Dose Route Frequency    ondansetron (ZOFRAN) injection 4 mg  4 mg IntraVENous Q6H PRN    apixaban (ELIQUIS) tablet 5 mg  5 mg Oral BID    amoxicillin-clavulanate (AUGMENTIN) 500-125 mg per tablet 1 Tablet  1 Tablet Oral Q24H    lidocaine 4 % patch 1 Patch  1 Patch TransDERmal Q24H    gabapentin (NEURONTIN) capsule 200 mg  200 mg Oral 3 times weekly    acetaminophen (TYLENOL) tablet 650 mg  650 mg Oral Q6H PRN    levothyroxine (SYNTHROID) tablet 125 mcg  125 mcg Oral 6am    latanoprost (XALATAN) 0.005 % ophthalmic solution 1 Drop  1 Drop Both Eyes QPM    fluconazole (DIFLUCAN) tablet 400 mg  400 mg Oral Q MON, WED & FRI    fludrocortisone (FLORINEF) tablet 0.2 mg  0.2 mg Oral DAILY    midodrine (PROAMATINE) tablet 10 mg  10 mg Oral TID WITH MEALS    epoetin caitlin-epbx (RETACRIT) injection 8,000 Units  8,000 Units SubCUTAneous Q MON, WED & FRI    famotidine (PF) (PEPCID) 20 mg in 0.9% sodium chloride 10 mL injection  20 mg IntraVENous Q24H    diphenhydrAMINE-zinc acetate 2%-0.1% (BENADRYL) cream   Topical TID PRN    glucose chewable tablet 16 g  4 Tablet Oral PRN    glucagon (GLUCAGEN) injection 1 mg  1 mg IntraMUSCular PRN    dextrose (D50W) injection syrg 12.5-25 g  25-50 mL IntraVENous PRN       Allergies: Ciprofloxacin, Cyclopentolate, Iron sucrose, Midodrine, and Statins-hmg-coa reductase inhibitors    Temp (24hrs), Av.2 °F (36.8 °C), Min:97.9 °F (36.6 °C), Max:99 °F (37.2 °C)    Visit Vitals  BP (!) 116/57 (BP 1 Location: Right upper arm, BP Patient Position: At rest)   Pulse 78   Temp 98.1 °F (36.7 °C)   Resp 18   Ht 5' 2\" (1.575 m)   Wt 96.6 kg (212 lb 14.4 oz)   SpO2 98%   BMI 38.94 kg/m²       ROS: 12 point ROS obtained in details. Pertinent positives as mentioned in HPI,   otherwise negative    Physical Exam:    General:  Alert, cooperative, NAD   Head:  Normocephalic, without obvious abnormality, atraumatic. Eyes:  Conjunctivae/corneas clear. Nose: Nares normal. Septum midline. Mucosa normal. No drainage or sinus tenderness. Throat: Lips, mucosa, and tongue normal. Teeth and gums normal.   Neck: Supple, symmetrical, trachea midline, no adenopathy, no carotid bruit and no JVD. Lungs:   Symmetrical chest rise; good AE bilat; CTAB; no wheezes/rhonchi/rales noted. Heart:  RRR, S1, S2 normal   Abdomen:   Soft, non-tender. Bowel sounds normal. No masses,  No organomegaly. Extremities: Extremities normal, atraumatic, no cyanosis or edema.    Pulses: 2+ and symmetric all ambulatory/drove to hospital extremities. Skin: Skin color, texture, turgor normal. Hives to R forearm   Neurologic: Grossly nonfocal   Devices: PIVs,             Labs: Results:   Chemistry Recent Labs     10/14/21  0136 10/13/21  0252 10/12/21  1014   GLU 76 91 134*    140 138   K 4.2 4.9 4.9    107 103   CO2 26 24 26   BUN 20* 37* 28*   CREA 4.78* 6.43* 5.71*   CA 8.5 8.1* 8.7   AGAP 7 9 9   BUCR 4* 6* 5*      CBC w/Diff Recent Labs     10/14/21  0136 10/13/21  0252 10/12/21  1014   WBC 5.6 4.9 5.4   RBC 2.79* 2.88* 3.17*   HGB 8.2* 8.6* 9.5*   HCT 27.1* 28.0* 30.3*    253 246      Microbiology No results for input(s): CULT in the last 72 hours. RADIOLOGY:    All available imaging studies/reports in [facility] for this admission were reviewed      Disclaimer: Sections of this note are dictated utilizing voice recognition software, which may have resulted in some phonetic based errors in grammar and contents. Even though attempts were made to correct all the mistakes, some may have been missed, and remained in the body of the document. If questions arise, please contact our department.     Dr. Darlina Sandifer, Infectious Disease Specialist  [phone]  October 14, 2021  10:14 AM

## 2021-10-14 NOTE — DISCHARGE SUMMARY
Discharge Summary  [facility]      Patient: Brando Barnes MRN: [id number]  CSN: [id number]    YOB: 1945  Age: 64 y.o. Sex: female      Admission Date: 10/8/2021 Discharge Date: 10/14/2021   Attending: Milton Matson MD PCP: Stanton Hamilton MD     ===================================================================    Reason for Admission:   Septic shock ([facility] [address] [A41.9, R65.21]    Discharge Diagnoses:   Septic shock  hypotension  UTI  Ureteral stricture s/p stent exchange 10/5  ESRD on HD      Important notes to PCP/ follow-up studies and evaluations   Follow up palliative and potential hospice with patient. Patient at time of discharge contemplating continuing dialysis or not. Patient to continue abx Augmentin until 10/18  Continue high dose fluconazole until 10/19 to cover candida nivariensis only takes M/W/F  Cardiology evaluated, started eliquis 5 bid, noted good rate control without av rubina blockers, will require outpatient follow up with primary cardiologist.   --Continued On florinef inpatient on 10/11, prior hx of taking it? consider continuing or titrating, concern for adrenal crisis for sudden discontinuation or assistance with orthostatic hypotension. Patient hypotensive in admission but support with florinef and midodrine. Continued gabapentin only on days of dialysis prn     Pending labs and studies:  None    Operative Procedures:   None    Discharge Medications:     Current Discharge Medication List      START taking these medications    Details   amoxicillin-clavulanate (AUGMENTIN) 500-125 mg per tablet Take 1 Tablet by mouth every twenty-four (24) hours for 4 doses. Qty: 4 Tablet, Refills: 0  Start date: 10/14/2021, End date: 10/18/2021      apixaban (ELIQUIS) 5 mg tablet Take 1 Tablet by mouth two (2) times a day.   Qty: 60 Tablet, Refills: 0  Start date: 10/14/2021      fluconazole (DIFLUCAN) 200 mg tablet Take 2 Tablets by mouth every Monday, Wednesday, Friday for 2 doses. FDA advises cautious prescribing of oral fluconazole in pregnancy. Qty: 4 Tablet, Refills: 0  Start date: 10/15/2021, End date: 10/19/2021      lidocaine 4 % patch To back for pain as needed  Qty: 10 Each, Refills: 0  Start date: 10/14/2021         CONTINUE these medications which have CHANGED    Details   fludrocortisone (FLORINEF) 0.1 mg tablet Take 2 Tablets by mouth daily. Qty: 60 Tablet, Refills: 0  Start date: 10/15/2021      gabapentin (NEURONTIN) 100 mg capsule Take 2 Capsules by mouth Three (3) times a week. Max Daily Amount: 200 mg. Take 2 capsules by mouth 3 times a week as needed for pain post dialysis. Indications: concern for back pain post dialysis  Qty: 15 Capsule, Refills: 0  Start date: 10/15/2021    Associated Diagnoses: Type 2 diabetes mellitus with diabetic autonomic neuropathy, without long-term current use of insulin ([facility]); ESRD (end stage renal disease) ([facility])      midodrine (PROAMATINE) 10 mg tablet Take 1 Tablet by mouth three (3) times daily (with meals) for 30 days. Qty: 90 Tablet, Refills: 0  Start date: 10/14/2021, End date: 11/13/2021         CONTINUE these medications which have NOT CHANGED    Details   vitamin B complex (B COMPLEX VITAMINS PO) Take 1 Tablet by mouth daily. doxercalciferol (HECTOROL IV) 4 mcg. HYDROmorphone (DILAUDID) 2 mg tablet as needed. meclizine (ANTIVERT) 25 mg tablet Take 25 mg by mouth as needed. methoxy peg-epoetin beta (MIRCERA INJECTION) 30 mcg. Narcan 4 mg/actuation nasal spray       vitamin D3-folic acid 7,977 unit- 1 mg tab Take 1 Tablet by mouth. DULoxetine (Cymbalta) 20 mg capsule Take 20 mg by mouth daily. b complex vitamins (Vitamins B Complex) tablet Take 1 Tab by mouth daily. estradioL (Estrace) 0.01 % (0.1 mg/gram) vaginal cream Apply a fingertip amount around the urethra three times a week. Qty: 30 g, Refills: 3      biotin 1,000 mcg chew Take 1 Tab by mouth daily. cyanocobalamin 1,000 mcg tablet Take 1,000 mcg by mouth daily. lactobacillus sp. 50 billion cpu (BIO-K PLUS) 50 billion cell -375 mg cap capsule Take 1 Cap by mouth daily. Qty: 30 Cap, Refills: 2      acetaminophen (TYLENOL) 325 mg tablet Take 650 mg by mouth two (2) times a day. allopurinoL (ZYLOPRIM) 100 mg tablet Take 200 mg by mouth daily. ascorbic acid, vitamin C, (VITAMIN C) 500 mg tablet Take 500 mg by mouth daily. calcitRIOL (ROCALTROL) 0.25 mcg capsule Take 0.25 mcg by mouth daily. cholecalciferol (VITAMIN D3) (2,000 UNITS /50 MCG) cap capsule Take 2,000 Units by mouth two (2) times a day. Take two tabs a total of 4000 units      latanoprost (XALATAN) 0.005 % ophthalmic solution Administer 1 Drop to both eyes nightly. One drop at bedtime      levothyroxine (SYNTHROID) 125 mcg tablet Take 125 mcg by mouth Daily (before breakfast). omeprazole (PRILOSEC) 20 mg capsule Take 20 mg by mouth daily. ondansetron (ZOFRAN ODT) 4 mg disintegrating tablet Take 4 mg by mouth every eight (8) hours as needed for Nausea or Vomiting. vit B Cmplx 3-FA-Vit C-Biotin (NEPHRO HOWIE RX) 1- mg-mg-mcg tablet Take 1 Tab by mouth daily. STOP taking these medications       heparin sod,porcine/0.9 % NaCl (heparin flush, porcine, in ns) 100 unit/mL kit Comments:   Reason for Stopping:         tamsulosin (FLOMAX) 0.4 mg capsule Comments:   Reason for Stopping:         sodium bicarbonate 650 mg tablet Comments:   Reason for Stopping:               Disposition: Home with Home Health    Consultants:    Cardiology, Urology, Palliative, Pulm Crit, Infectious Disease    [address] Course (including pertinent history and physical findings)  Patient presented for hypotension post dialysis treatment. Started on pressor support, never intubated. Patient had septic workup, started on abx. Concern for urosepsis given hx.  Initial blood culture found to be positive 1/2 for gm + cocci in clusters. ID consulted started broad spectrum abx, thought intial positive culture possibly due to contaminant. Patient continued hd as tolerated but continues with hypotensive episodes. Patient was able to maintain pressures without pressor support, transferred out of ICU. Urology followed without concern for hydro post US post stent exchange 10/5, signed off   ID following urine and blood culture, noting no growth to blood culture, urine culture positive for candida glabrata and enterococcus. Treated with abx inpatient. Patient with episodes of tachycardia 10/12-13, cardiology consulted noted episodes of atrial flutter, rate controlled without need of medications, noted patient with ekg in past year with afib. Started on anticoagulation eliquis 5 bid. Cleared for discharge with outpatient follow up with primary cardiologist.   ID noting continued abx oral augmentin until 10/18 and fluconazole MWF until 10/19. Patient continued with improved BP, will continue with midodrine and florinef outpatient. CURRENT ADMISSION IMAGING RESULTS   XR ABD (KUB)    Result Date: 10/8/2021  Radiographically orthotopic right ureteral stent. Nonspecific nonobstructive bowel gas pattern. CT HEAD WO CONT    Result Date: 10/8/2021  No CT finding for acute territorial infarct or acute intracranial hemorrhage. Fairly extensive periventricular white matter change. Nonspecific but often associated with chronic microvascular ischemic disease. US RETROPERITONEUM COMP    Result Date: 10/9/2021  Atrophic kidneys compatible with chronic kidney disease. No hydronephrosis. Stent not clearly visualized. XR CHEST PORT    Result Date: 10/8/2021  Right CVL tip at the proximal SVC. No pneumothorax. XR CHEST PORT    Result Date: 10/8/2021  No acute abnormalities. No pneumonia or CHF.         Cardiology Procedures/Testing:  MODALITY RESULTS   EKG Results for orders placed or performed during the hospital encounter of 10/08/21 EKG, 12 LEAD, INITIAL   Result Value Ref Range    Ventricular Rate 95 BPM    Atrial Rate 95 BPM    P-R Interval 188 ms    QRS Duration 82 ms    Q-T Interval 354 ms    QTC Calculation (Bezet) 444 ms    Calculated P Axis 33 degrees    Calculated R Axis -35 degrees    Calculated T Axis 36 degrees    Diagnosis       Normal sinus rhythm  Left axis deviation  Abnormal ECG  When compared with ECG of 08-OCT-2021 15:20,  No significant change was found  Confirmed by Todd Frazier MD, --- ([id number]) on 10/12/2021 1:15:10 PM         ECHO 10/08/21    ECHO ADULT FOLLOW-UP OR LIMITED 10/10/2021 10/10/2021    Interpretation Summary  · LV: Estimated LVEF is 55 - 60%. Visually measured ejection fraction. Normal cavity size and systolic function (ejection fraction normal). Mild concentric hypertrophy. Wall motion: normal. Age-appropriate left ventricular diastolic function. · PA: Pulmonary arterial systolic pressure is 35 mmHg. Signed by: Deborah Toledo MD on 10/10/2021 11:57 AM     IR Results from Hospital Encounter encounter on 10/14/20    IR NEPHROSTOMY PERC RT PLC CATH  SI    Impression  IMPRESSION:    Technically successful nephrostogram.    Technically successful fluoroscopy assisted exchange for 8 [place] double-J  catheter, right side. Moderate sedation provided for the procedure, 30 minutes      Results from [place] encounter on 07/15/20    IR EXCHANGE NEPHRO PERC RT SI    Impression  IMPRESSION:    Successful, uncomplicated right diagnostic antegrade nephrostogram with  antegrade nephroureteral stent exchange. Proximal and distal renal collecting  systems completely decompressed. Free flow contrast media in the urinary  bladder. No debris. No hydronephrosis or hydroureter. Plan: Patient should come back interventional radiology department at  approximately 8 weeks for routine exchange of the right nephroureteral stent.       Results from [place] encounter on 04/13/20    IR NEPHROURETERAL PERC RT PLC CATH NEW ACCESS  SI    Impression  IMPRESSION:    Successful uncomplicated right nephrostomy tube removal, high-grade distal right  ureteral stricture recanalization, right ureteral high-grade stricture balloon  ureteroplasty as well as placement of the new right nephroureteral catheter. Plan: Patient will come back to interventional radiology in approximately 4  weeks for detailed antegrade nephrostogram and possible nephroureteral catheter  removal. If stricture persists however internalization with double-J stent will  be considered. CATH 09/24/20    INVASIVE VASCULAR PROCEDURE 09/26/2020 9/26/2020    Narrative  Preoperative diagnosis: Left upper extremity malfunctioning arteriovenous fistula end-stage renal disease    Postoperative diagnosis: At the extremity malfunctioning tube and fistula with end-stage renal disease    Procedures performed:  #1  Left upper extremity fistulagram  #2  Central venogram  #3  Reflux arteriogram      Cultures: None    Specimens: None    Drains: None    Estimated blood loss: Less than 50 mL    Assistants: None    Implants: Please see above    Complications: None    Anesthesia: Moderate conscious sedation of 9 minutes was performed by an independent provider giving the medications per my discretion and monitoring of vital signs throughout the case. Indications for the procedure:  Roya Chang is a 66 y.o. female with end-stage renal disease who was referred by dialysis center after they had difficulty cannulating her fistula. She states this is only occasionally depends on the tech. She has no other complaints. We discussed performing left lower extremity fistulogram.  Patient was given the appropriate risk and benefits of the procedure including but not limited to bleeding, infection, damage to adjacent structures, MI, stroke, death, loss of lower extremity, need for further surgery. Patient was understanding of all the risks and underwent a procedure.     Operative findings:  #1  No evidence of venous outflow stenosis or arterial inflow stenosis    Procedure:  Patient was correctly identified in the precath area and taken to the Cath Lab in stable condition. Patient had pre-incision timeout prior to any incision. Patient was prepped and draped in the normal sterile fashion according to CDC guidelines aseptic technique. We then anesthetized the left arm with 1% lidocaine and obtain percutaneous access of the left lower extremity arteriovenous fistula. We then performed a fistulogram with central venogram reflux arteriogram.  No evidence of stenosis of the identified. I treated placed in the we advanced a manual wire and a micro-sheath. After completing our fistulogram we remove the micro sheath and held manual pressure for hemostasis. I was present scrubbed and entire procedure all counts were correct. Signed by:  Tae Galdamez MD on 9/26/2020 11:22 AM       Special Testing/Procedures:  MODALITY RESULTS   MICRO All Micro Results     Procedure Component Value Units Date/Time    CULTURE, URINE [[id number]]  (Abnormal)  (Susceptibility) Collected: 10/08/21 1915    Order Status: Completed Specimen: Urine from Clean catch Updated: 10/14/21 0744     Special Requests: NO SPECIAL REQUESTS        Ida Count --        >100,000  COLONIES/mL       Culture result: LAURIE GLABRATA         ENTEROCOCCUS FAECALIS       CULTURE, BLOOD [[id number]] Collected: 10/10/21 1631    Order Status: Completed Specimen: Blood Updated: 10/14/21 0647     Special Requests: NO SPECIAL REQUESTS        Culture result: NO GROWTH 4 DAYS       CULTURE, BLOOD [[id number]] Collected: 10/08/21 1550    Order Status: Completed Specimen: Blood Updated: 10/14/21 0647     Special Requests: NO SPECIAL REQUESTS        Culture result: NO GROWTH 6 DAYS       CULTURE, BLOOD [[id number]]  (Abnormal) Collected: 10/08/21 1535    Order Status: Completed Specimen: Blood Updated: 10/11/21 7580     Special Requests: NO SPECIAL REQUESTS        GRAM STAIN       AEROBIC BOTTLE GRAM POSITIVE COCCI IN GROUPS                  SMEAR CALLED TO AND CORRECTLY REPEATED BY: NOVA ALVES ER ON [id number] AT 2202 HRS TO 1396. Culture result:       STAPHYLOCOCCUS SPECIES, COAGULASE NEGATIVE GROWING IN 1 OF 2 BOTTLES DRAWN (SITE = [facility])                  PRELIMINARY REPORT OF  [facility]  CALLED TO AND READ BACK BY  PA E TRACT ER ON [id number] AT 2202 TO 1396.        CULTURE, URINE [[id number]] Collected: 10/09/21 1415    Order Status: Canceled Specimen: Cath Urine     RESPIRATORY VIRUS PANEL W/COVID-19, PCR [[id number]] Collected: 10/09/21 9036    Order Status: Completed Specimen: Nasopharyngeal Updated: 10/09/21 0825     Adenovirus Not detected        Coronavirus 229E Not detected        Coronavirus HKU1 Not detected        Coronavirus CVNL63 Not detected        Coronavirus OC43 Not detected        SARS-CoV-2, PCR Not detected        Metapneumovirus Not detected        Rhinovirus and Enterovirus Not detected        Influenza A Not detected        Influenza B Not detected        Parainfluenza 1 Not detected        Parainfluenza 2 Not detected        Parainfluenza 3 Not detected        Parainfluenza virus 4 Not detected        RSV by PCR Not detected        B. parapertussis, PCR Not detected        Bordetella pertussis - PCR Not detected        Chlamydophila pneumoniae DNA, QL, PCR Not detected        Mycoplasma pneumoniae DNA, QL, PCR Not detected            ABG No results found for: PH, PHI, PCO2, PCO2I, PO2, PO2I, HCO3, HCO3I, FIO2, FIO2I   UA Results for orders placed or performed in visit on 02/19/21   AMB POC URINALYSIS DIP STICK AUTO W/O MICRO     Status: None   Result Value Ref Range Status    Color (UA POC) Dark Yellow  Final     Comment: Milky    Clarity (UA POC) Turbid  Final    Glucose (UA POC) Negative Negative Final    Bilirubin (UA POC) Negative Negative Final    Ketones (UA POC) Negative Negative Final    Specific gravity (UA POC) 1.025 1.001 - 1.035 Final    Blood (UA POC) 3+ Negative Final    pH (UA POC) 7.5 4.6 - 8.0 Final    Protein (UA POC) 3+ Negative Final    Urobilinogen (UA POC) 0.2 mg/dL 0.2 - 1 Final    Nitrites (UA POC) Negative Negative Final    Leukocyte esterase (UA POC) 3+ Negative Final        Laboratory Results:  LABORATORY RESULTS   HEMATOLOGY Lab Results   Component Value Date/Time    WBC 5.6 10/14/2021 01:36 AM    Hemoglobin, POC 8.8 (L) 09/24/2020 08:45 AM    HGB 8.2 (L) 10/14/2021 01:36 AM    Hematocrit, POC 26 (L) 09/24/2020 08:45 AM    HCT 27.1 (L) 10/14/2021 01:36 AM    PLATELET 383 59/74/7378 01:36 AM    MCV 97.1 10/14/2021 01:36 AM       CHEMISTRIES Lab Results   Component Value Date/Time    Sodium 141 10/14/2021 01:36 AM    Potassium 4.2 10/14/2021 01:36 AM    Chloride 108 10/14/2021 01:36 AM    CO2 26 10/14/2021 01:36 AM    Anion gap 7 10/14/2021 01:36 AM    Glucose 76 10/14/2021 01:36 AM    BUN 20 (H) 10/14/2021 01:36 AM    Creatinine 4.78 (H) 10/14/2021 01:36 AM    BUN/Creatinine ratio 4 (L) 10/14/2021 01:36 AM    GFR est AA 11 (L) 10/14/2021 01:36 AM    GFR est non-AA 9 (L) 10/14/2021 01:36 AM    Calcium 8.5 10/14/2021 01:36 AM      HEPATIC FUNCTION Lab Results   Component Value Date/Time    Albumin 3.1 (L) 10/08/2021 03:50 PM    Bilirubin, total 0.4 10/08/2021 03:50 PM    Protein, total 7.9 10/08/2021 03:50 PM    Globulin 4.8 (H) 10/08/2021 03:50 PM    A-G Ratio 0.6 (L) 10/08/2021 03:50 PM    ALT (SGPT) 12 (L) 10/08/2021 03:50 PM    Alk.  phosphatase 139 (H) 10/08/2021 03:50 PM       LACTIC ACID Lab Results   Component Value Date/Time    Lactic acid 1.1 05/15/2020 08:29 AM      CARDIAC PANEL Lab Results   Component Value Date/Time    Troponin-I, QT <0.02 10/08/2021 03:50 PM      NT-proBNP Lab Results   Component Value Date/Time    NT pro-BNP 5,643 (H) 10/08/2021 03:50 PM      THYROID Lab Results   Component Value Date/Time    TSH 2.50 10/08/2021 03:50 PM        Functional status and cognitive function: Ambulates with: Walker  Status: alert, cooperative, no distress, appears stated age  Condition: STABLE    Physical exam on day of discharge:    General:  alert, cooperative, no distress, appears stated age  Neck:  no JVD  Lungs:  clear to auscultation bilaterally  Heart:  regular rate and rhythm  Abdomen:  abdomen is soft without significant tenderness, masses, organomegaly or guarding  Extremities:  extremities normal, atraumatic, no cyanosis or edema    Code status and advanced care plan: DNR    Point of Contact lul  Relationship: son  [phone]       RISK CALCULATORS:  SCORE RESULT   READMISSION RISK SCORE High Risk            21 Total Score    3 Patient Length of Stay (>5 days = 3)    4 IP Visits Last 12 Months (1-3=4, 4=9, >4=11)    5 Pt. Coverage (Medicare=5 , Medicaid, or Self-Pay=4)    9 Charlson Comorbidity Score (Age + Comorbid Conditions)        Criteria that do not apply:    Has Seen PCP in Last 6 Months (Yes=3, No=0)    . Living with Significant Other. Assisted Living. LTAC. SNF.  or   Rehab         Follow-up:   Follow-up Information     Follow up With Specialties Details Why Contact Info    Radha Nur Ashley Velasquezganesh [facility]  [phone]            =================================================================    Patricia Greenwood MD, PGY-2   [facility]   October 14, 2021, 1:07 PM

## 2021-10-14 NOTE — PROGRESS NOTES
Discharge order noted for today. Pt has been accepted to [facility] agency. Met with patient  and are agreeable to the transition plan today. Transport has been arranged through family. Patient's discharge summary and home health  orders have been forwarded to [facility] home health  agency  Updated bedside RN, ,  to the transition plan. Discharge information has been documented on the AVS.     Pt already has all needed DME at home.  Medicare does not cover BSC unless bathroom on a different floor or not mobile

## 2021-10-16 ENCOUNTER — HOME CARE VISIT (OUTPATIENT)
Dept: SCHEDULING | Facility: HOME HEALTH | Age: 76
End: 2021-10-16
Payer: MEDICARE

## 2021-10-16 LAB
BACTERIA SPEC CULT: NORMAL
SERVICE CMNT-IMP: NORMAL

## 2021-10-16 PROCEDURE — 400013 HH SOC

## 2021-10-16 PROCEDURE — G0299 HHS/HOSPICE OF RN EA 15 MIN: HCPCS

## 2021-10-16 NOTE — Clinical Note
Patient was admitted to [facility] services on 10/16/21. Upon reviewing medications, the following moderate interactions were noted: florinef and estrace; diflucan and eliquis; eliquis and cymbalta; neurontin and dilaudid; estrace and synthroid. pt needing to find calcitrol, xalatan, estrace, tylenol. pt needing to get vitamin b12, nephrovite, probiotic, filled. pt not taking narcan, lidocaine, hectorol. pt gets mircera at dialysis, not at home. Thank you!

## 2021-10-18 VITALS
OXYGEN SATURATION: 98 % | DIASTOLIC BLOOD PRESSURE: 76 MMHG | HEART RATE: 79 BPM | SYSTOLIC BLOOD PRESSURE: 118 MMHG | RESPIRATION RATE: 17 BRPM | TEMPERATURE: 98 F

## 2021-10-19 ENCOUNTER — HOME CARE VISIT (OUTPATIENT)
Dept: SCHEDULING | Facility: HOME HEALTH | Age: 76
End: 2021-10-19
Payer: MEDICARE

## 2021-10-19 VITALS
RESPIRATION RATE: 18 BRPM | SYSTOLIC BLOOD PRESSURE: 133 MMHG | TEMPERATURE: 98.6 F | OXYGEN SATURATION: 97 % | DIASTOLIC BLOOD PRESSURE: 82 MMHG | HEART RATE: 85 BPM

## 2021-10-19 VITALS
TEMPERATURE: 99.1 F | DIASTOLIC BLOOD PRESSURE: 48 MMHG | OXYGEN SATURATION: 97 % | HEART RATE: 91 BPM | SYSTOLIC BLOOD PRESSURE: 76 MMHG | RESPIRATION RATE: 18 BRPM

## 2021-10-19 PROCEDURE — G0300 HHS/HOSPICE OF LPN EA 15 MIN: HCPCS

## 2021-10-19 PROCEDURE — G0151 HHCP-SERV OF PT,EA 15 MIN: HCPCS

## 2021-10-19 NOTE — Clinical Note
Therapy Scores:     Grooming 3  Upper bathing 3  Lower bathing 3  Toileting 1  Transfers 4  Ambulation 5  Dyspnea 2  Pain 0  Numer of visits 14

## 2021-10-19 NOTE — HOME HEALTH
Skilled services/Home bound verification:     Skilled Reason for admission/summary of clinical condition:  UTI, hypotension requiring disease process teaching and medication management. This patient is homebound for the following reasons Requires considerable and taxing effort to leave the home  and Requires the assistance of 1 or more persons to leave the home . Caregiver: son/daughter in law. Caregiver assists with iadls, adls, meal prep, med management, taking to md appointments. Medications reconciled and all medications are not available in the home this visit. pt needing to find calcitrol, xalatan, estrace, tylenol. pt needing to get vitamin b12, nephrovite, probiotic, filled. pt not taking narcan, lidocaine, hectorol. pt gets mircera at dialysis, not at home. The following education was provided regarding medications, medication interactions, and look alike medications (specify): reviewed side effects, purposes, dosage, frequencies. Medications  are too early to assess effectiveness. at this time. High risk medication teaching regarding anticoagulants, hyperglycemic agents or opiod narcotics performed (specify) eliquis, dilaudid-reviewed side effects, purposes, dosage, frequencies. MD notified of any discrepancies/medication interactions- florinef and estrace; diflucan and eliquis; eliquis and cymbalta; neurontin and dilaudid; estrace and synthroid. Home health supplies by type and quantity ordered/delivered this visit include: na    Patient education provided this visit to include: patient/cg instructed to monitor for edema/increase in edema, to elevate extremity when edema occurs and to notify md if edema exceeds normal limits for patient. no edema noted at this time. patient made aware to monitor for s/s of infection [increased swelling, increased redness around site, increased pain, foul smelling drainage, fever] aware who to report to/when. no s/s of infection noted.  encouraged patient to get three nutritional meals daily and to stay hydrated, drink plenty of fluids. reviewed low sodium diet- patient aware to limit sodium, no added sodium to diet. reviewed foods to avoid, how to order foods when eating out, how to read nutrition labels and measure sodium intake. discussed importance of monitoring blood pressure daily and recording for review, discussed hypertension, causes/long term effects of uncontrolled hypertension. pt instructed to follow with diabetic diet- monitoring sugar intake, limiting foods with high sugar content. Discussed dietary adjustments such as: Reduce portion sizes, Limit daily carbohydrate intake to not greater than 45-60 grams per meal for a total of <180 grams of carbohydrate daily, Avoid concentrated carbohydrates such as soft drinks, sweet tea, and sweet treats. Increase physical activity along with strength training as tolerated to facilitate weight loss and build muscle mass. patient instructed to follow a renal diet- limiting sodium, phosphorous and increasing protein. SN discussed s/s of infection to monitor for, s/s of UTI, who to report to/when. SN notified patient/cg on good hygiene, complete bladder emptying, and avoiding use of powders or lotions. Instruct on how to recognize symptoms of urinary tract infection including elevated temperature, changes in mental status or confusion, frequency of urination, pain, hesitancy and urgency, malodorous urine, urine that has changed in color or clarity. patient to follow eliquis regimen as directed by MD and to monitor for s/s of excessive bleeding, aware who to report to/when. Patients made aware to report bleeding to their practitioner including blood in the urine or stools and bleeding from minor cuts and scratches that won't stop. Patient also made aware to recognize the signs and symptoms of significant bleeding that can include unsteady gait, dizziness, new headache, and nausea and vomiting.  discussed fall precautions in detail- having lighted hallways, removing throw rugs, monitoring medication that may alter mental status. patient made aware to turn every 2 hours and to keep pressure off of bony prominences, to monitor for any pressure ulcer development/worsening. pt denies any questions or concerns at this time. Patient/caregiver degree of understanding:good    Home exercise program/Homework provided: patient instructed to perform sob hep 4-5 x daily and prn for sob, to promote lung expansion. pt also encouraged to use ICS q 2 hours and to perform sob hep during therapy. patient instructed to perform incontinence hep 3-4 x daily to strengthen muscles and to perform timed voiding q 2 hours to prevent incontinence from occurring. Pt/Caregiver instructed on plan of care and are agreeable to plan of care at this time. Physician Dr. Ferrera Leisure office notified of patient admission to home health and plan of care including anticipated frequency of 1w1, 2w2, 1w6, 2 prn and treatments/interventions/modalities of disease process teaching and medication management. Discharge planning discussed with patient and caregiver. Discharge planning as follows: Patient will be discharged once education has completed, patient is medically stable and pt/cg are able to independently manage medications and disease process. Pt/Caregiver did verbalize understanding of discharge planning. Next MD appointment TBD (date) with Dr. Le Dukes. Patient/caregiver encouraged/instructed to keep appointment as lack of follow through with physician appointment could result in discontinuation of home care services for non-compliance.

## 2021-10-19 NOTE — HOME HEALTH
Skilled reason for visit: Skilled nursing assessment, medication review and education    Caregiver involvement: Son lives in the home    Medications reviewed and all medications are available in the home this visit. The following education was provided regarding medications, medication interactions, and look alike medications (specify): Elequis is to be taken at the same time each day, do not skip a dose, no double doses, avoid increasing vitamin K or extra leafy green vegtables. Medications  are effective at this time. Home health supplies by type and quantity ordered/delivered this visit include: n/a    Patient education provided this visit: notified patient of heart healthy diet- instructed patient to reduce intake of saturated and fatty acids (butter, creams, red meats, fried foods, margarines, high fat baked goods, shortening, cheeses, etc) and to increase daily fresh fruits and vegetables and whole grains, encouraged to avoid canned and processed foods as much as possible. Skilled Care Performed this visit: notified patient of heart healthy diet- instructed patient to reduce intake of saturated and fatty acids (butter, creams, red meats, fried foods, margarines, high fat baked goods, shortening, cheeses, etc) and to increase daily fresh fruits and vegetables and whole grains, encouraged to avoid canned and processed foods as much as possible.     Patient's Progress towards personal goals: Patient continues to comply with physicians orders and keep medical appointments    Home exercise program: sn instructed patient to perform deep breathing exercises, 5-6 breaths 5 x daily to promote air exchange and prevent pneumonia     Continued need for the following skills: Nursing    Plan for next visit: Skilled nursing assessment, medication review and education    Patient and/or caregiver notified and agrees to changes in the Plan of Care N/A      The following discharge planning was discussed with the pt/caregiver: Patient to discharge when all goals are met.

## 2021-10-20 NOTE — HOME HEALTH
Evaluation Summary: Mr. Aiden Harp is a 64year old female being admitted to home health for PT/SN/OT services. The patient's caregiver/representative not present, but able and willing to provide care daily regular evenings. Patient participated in goal setting and care planning and are agreeable to the care plan. Discharge planning/training was initiated for independence and maximize independence. PMHx:   Acidosis    Anemia    Arteriovenous fistula (HCC)    Chronic kidney disease   on HD at [address] on MWF. [phone]    Chronic pain    CKD (chronic kidney disease)    Diabetes ([facility] [address]   no meds now    ESRD (end stage renal disease) on payton lysis ([facility] [address] 9/9/2021    HLD (hyperlipidemia)    HTN (hypertension)    Hyperparathyroidism due to renal insufficiency ([facility] [address]    Hypothyroid    Kidney stone    Lung mass    Recurrent UTI    Ureter, stricture    Uric acid nephrolithiasis    Urinary incontinence   SUBJECTIVE:\"My wrist is really hurting me today. \"   Pt reports 6/10 pain today and  in the past 24 hours in R wrist and thinks it was because of placement of IV. Pt denies falls. REQUIRES CAREGIVER ASSISTANCE FOR: meals, ADLs,  transportation, medications  PLOF: Pt lives with son and daughter in 2 level home with chair glide to get to second floor where bedroom and bathroom are. Pt does not have bathroom on first floor. Pt was ambulating with FWW or rollator. Pt has HD M, W, F.  Pt was indep with ADLs including showers with shower seat. Pt was doing her own laundry, and family makes meals. Pt is indep with medication management. Pt was no longer driving. DME: FWW, rollator, commode, glucometer, cane, bath chair   MEDICATIONS REVIEWED AND UPDATED: Medications reconciled and all medications are available in the home this visit.   The following education was provided regarding high risk medications (Eliquis 5 mg as blood thinner, hydromorphone 2 mg ), medication interactions, and look a like medications: Continue medication as prescribed by MD. Medications are effective at this time. NEXT MD APPT:  TBD,   has HD  - M, W, F  ROM: R wrist tender but not limited in ROM. B LE WNL. STRENGTH:  see strength section for details. WOUNDS: none noted or reported. BED MOBILITY: indep    TRANSFERS: sit to stand from chair, bed, and toilet with SBA. Pt having increased time pushing off with R hand due to pain. GAIT: pt ambulated 35'x2, 20' x2 with FWW and SBA. Pt also ambulated short distances furniture walking with walls and chairs. STAIRS: up and down 3 steps with 1 rail and CGA/Han with FWW.  up /down 1 platform step with min A and FWW and min A for walker placement. Cues on sequencing. Pt very unsafe with FWW and pt states she does this on MWF for HD. Son has been present when she leaves for HD and when she returns. Pt states the stairs returning are more difficult due to fatigue. BALANCE: Pt scored 19/28 on [employer]etti Balance Assessment placing pt at moderate risk for falls. PATIENT EDUCATION PROVIDED THIS VISIT: safety for transfers and hand placement, deep breathing,PLB, alternate heat and ice with barrier between R wrist and skin for 20 min on and 40 min off daily as needed,  clearing obstacles and clear pathways to prevent falls as well as proper lighting at night time    HEP consisting of:  1. Walking every  1 hour during the day with FWW    patient verbalized understanding. CONTINUED NEED FOR THE FOLLOWING SKILLS: HH PT is medically necessary to  address pain,   decreased strength, increased swelling, decreased independence with functional transfers, impaired gait, impaired stair negotiation, and impaired balance in order to improve functional independence, quality of life, return to PLOF, and reduce the risk for falls.   PLAN: 2w4   DISCHARGE PLANNING DISCUSSED: Discharge to self and family under MD supervision once all goals have been met or patient has reached max potential.

## 2021-10-21 ENCOUNTER — HOME CARE VISIT (OUTPATIENT)
Dept: SCHEDULING | Facility: HOME HEALTH | Age: 76
End: 2021-10-21
Payer: MEDICARE

## 2021-10-21 VITALS
TEMPERATURE: 97.5 F | OXYGEN SATURATION: 99 % | HEART RATE: 93 BPM | SYSTOLIC BLOOD PRESSURE: 88 MMHG | DIASTOLIC BLOOD PRESSURE: 57 MMHG

## 2021-10-21 VITALS
HEART RATE: 82 BPM | TEMPERATURE: 98 F | RESPIRATION RATE: 18 BRPM | SYSTOLIC BLOOD PRESSURE: 108 MMHG | OXYGEN SATURATION: 96 % | DIASTOLIC BLOOD PRESSURE: 70 MMHG

## 2021-10-21 PROCEDURE — G0152 HHCP-SERV OF OT,EA 15 MIN: HCPCS

## 2021-10-21 PROCEDURE — G0300 HHS/HOSPICE OF LPN EA 15 MIN: HCPCS

## 2021-10-21 PROCEDURE — G0157 HHC PT ASSISTANT EA 15: HCPCS

## 2021-10-21 NOTE — HOME HEALTH
DOCTOR'S VISIT:  TBD as pt can't find    SUBJECTIVE:  Pt reports that she was exhausted and that her medications make her shake so much that she can't take meds. Pt reports that her BP gets low all the time, chris w HD. Pt reports that when she feels very unsteady and tired she WC walks in home. NARRATIVE:  Pt amb w RW w Mod I x 12 feet x 2 to open the door for therapist.   Pt extensively educated on [employer]. Suggested that she put her pills in a small cup and take them from there so that she won't drop them. Instructed in, gave handout for, and pt performed sitting ther ex for HEP for CHRIS LEs x 10 ea:  LAQ w 5 sec hold, HS curls, Marches, Hip ADD/ABD, HR/TR, and unsupported sit w MC/VC for upright posture w thoracic ext wo chin or shoulder girdle lift. CAREGIVER INVOLVEMENT/ASSISTANCE NEEDED FOR:  Pt's son and daughter in law assists w ADLs, medications, meals, and transportation. HOME HEALTH SUPPLIES by type and quantity ordered/delivered this visit include: None      ASSESSMENT AND PROGRESS TOWARD GOALS:    Progressing fair toward goals for transfers, gait, balance, and strength w amb w RW w Mod I x 12 feet x 2 and performance of 6 seated BLE ther ex. Patient continues to have deficits in  transfers, gait, balance, stairs, and strength due to recent sepsis from UTI and chronic UTI infections. .    Patient will benefit from continued intervention with progression of all PT goals to improve functional independence, prevent falls, decrease burden of care, and improve quality of life. CONTINUED NEED FOR THE FOLLOWING SKILLS:  HH PT is medically necessary to  address pain, decreased strength, decreased independence with functional transfers, impaired gait, impaired stair negotiation, and impaired balance in order to improve functional independence, quality of life, return to PLOF, and reduce the risk for falls.     PLAN FOR NEXT VISIT:  Standing ther ex and static balance    DISCHARGE PLANNING was discussed with the pt/caregiver:   Frequency: 2wk4, w  2wk3 remaining with anticipated DC on 11/12.

## 2021-10-21 NOTE — HOME HEALTH
Skilled reason for visit: Skilled nursing assessment, medication review and education    Caregiver involvement: Son lives in the home    Medications reviewed and all medications are available in the home this visit. The following education was provided regarding medications, medication interactions, and look alike medications (specify): Elequis, must be taken at the same time each day, do not take double doses, do not skip a dose. Monitor for increased bruising or bleeding. Notify medical of any changes    Medications  are effective at this time. Home health supplies by type and quantity ordered/delivered this visit include: n/a    Patient education provided this visit:Taking medications as directed by physician each day.   Take time when moving from a sitting to a standing position due to drops in BP to avoid falls    Skilled Care Performed this visit: Skilled nursing assessment, medication review and education    Patient's Progress towards personal goals: Patient working toward discharge by complying with physicians orders and keeping all medical appointments    Home exercise program: sn instructed patient to perform deep breathing exercises, 5-6 breaths 5 x daily to promote air exchange and prevent pneumonia     Continued need for the following skills: Nursing    Plan for next visit: Skilled nursing assessment, medication review and education    Patient and/or caregiver notified and agrees to changes in the Plan of Care N/A      The following discharge planning was discussed with the pt/caregiver: Patient to discharge when medication can be managed independently and all goals are met

## 2021-10-22 ENCOUNTER — HOME CARE VISIT (OUTPATIENT)
Dept: HOME HEALTH SERVICES | Facility: HOME HEALTH | Age: 76
End: 2021-10-22
Payer: MEDICARE

## 2021-10-22 VITALS
TEMPERATURE: 97.4 F | SYSTOLIC BLOOD PRESSURE: 100 MMHG | DIASTOLIC BLOOD PRESSURE: 68 MMHG | HEART RATE: 84 BPM | OXYGEN SATURATION: 99 %

## 2021-10-22 NOTE — HOME HEALTH
Caregiver involvement: Santos Bland (son) lives with pt and provides daily emotional support. Medications reconciled and all medications are available in the home this visit. The following education was provided regarding medications, medication interactions, and look a like medications: Continue as directed by MD.  Medications  are effective at this time. Home health supplies by type and quantity ordered/delivered this visit include: na    Patient education provided this visit:  Educated pt on use of rollator with ambulation and transfers in the home to reduce risk of falls. Encouraged use of the w/c for rest/energy conservation when feeling fatigued. Progress toward goals: Ms. Jesús Fu has good rehab potential to return to her independent PLOF for self care and mobility. She currently requires min A for safety with ADL and mobility and has famly with her to provide supervision to ensure safety. Home exercise program: complete with S and use of handout for carryover of HEP to improve UE strength and preserve ROM of the R shoulder for ease with ADL participation. Continued need for the following skills: Nursing, Physical Therapy and Occupational Therapy    The following discharge planning was discussed with the pt/caregiver: 1w1, 2w2, 1w1 with plans to discharge to self once maximal potential has been achieved with self care and functional mobility in the home. CONTINUED NEED FOR THE FOLLOWING SKILLS: HH OT is medically necessary to address barriers of decreased R UE ROM, decreased L UE sensation, decreased balance, generalized weakness and decreased activity tolerance. These barriers limit pt's participation in ADL and functional mobility safely and would benefit from continued skilled OT to maximize independende and reduce burden on caregiver.           Inpatient Notes  HPI:         Lina Reyes is a 64 y.o. female with PMH of ESRD on HD, chronic hypotension on midodrine, ureteral stricture s/p stent by urology, recurrent UTIs, T2DM presented to the ED from dialysis center after having low BPs after an HD treatment with associated lightheadedness. Admitted for hypotension requiring pressors. Pt has been seen in PFM clinic 09/27, urine culture+ for pseudomonas aeruginosa, pan sensitive. Was tr eated with 7 days of ciprofloxacin. Pt's Bps soft at that time midodrine was increased to 2.5 TID from BID. Ureteral stricture s/p stent exchange 10/5 with Dr. Dylon Le. Pt reports on the day of admission she completed a full run of dialysis but was so weak and dizzy following dialysis she was unable to ambulate. In ED on 10/9, found to be hypotensive to the 70s, lactic acid mildly elevated, started on broadspectrum anti biotics given hx of recurrent UTIs. Admitted to ICU for hypotension in setting of possible septic shock requiring pressor support. Nephro, ID, urology consulted. ICU course as detailed below:         Hypotension: acute on chronic worsening of hypotension vs septic shock Initially started on levophed. Midodrine increased to 10mg TID, pt required levophed until 10/11 at 03:00. Bps have been low stable in 90s/60s since then. Septic Shock:  Afebrile, no leukocytosis, low procal. ID consulted. One BCx positive for GPC (? Contamination). Received vanc, cefepime, gentamicin initially. Per ID, c/w vanc and gentamicin pending urine and blood cx. Ucx and Repeat Bcx so far with no growth. Lactic acidosis - resolved    S/p ureteric stenting - Retroperitoneal US wo hydronephrosis. Urology following. No intervention per urology at this time. ESRD - Dialys is per nephro (M,W,F). Last received 10/11. Tolerating well. T2D - diet controlled. Transferred to PFM service 10/11. On exam in ED, pt feels well at this time. pt denies headache, chest pain, shortness of breath, abdominal pain, nausea, vomiting, urinary symptoms, fevers.       DME  Standard Walker   Other (specify in comments)   Cane   Manual wheelchair   Lift chair   Bath chair   Hand held shower   Grab bars   3-in-1 [employer]   Other (specify in comments)   Glucometer

## 2021-10-26 ENCOUNTER — HOME CARE VISIT (OUTPATIENT)
Dept: SCHEDULING | Facility: HOME HEALTH | Age: 76
End: 2021-10-26
Payer: MEDICARE

## 2021-10-26 VITALS
HEART RATE: 70 BPM | RESPIRATION RATE: 18 BRPM | TEMPERATURE: 98 F | DIASTOLIC BLOOD PRESSURE: 64 MMHG | OXYGEN SATURATION: 98 % | SYSTOLIC BLOOD PRESSURE: 118 MMHG

## 2021-10-26 VITALS
SYSTOLIC BLOOD PRESSURE: 107 MMHG | TEMPERATURE: 97.7 F | DIASTOLIC BLOOD PRESSURE: 67 MMHG | OXYGEN SATURATION: 98 % | HEART RATE: 82 BPM

## 2021-10-26 PROCEDURE — G0157 HHC PT ASSISTANT EA 15: HCPCS

## 2021-10-26 PROCEDURE — G0152 HHCP-SERV OF OT,EA 15 MIN: HCPCS

## 2021-10-26 PROCEDURE — G0299 HHS/HOSPICE OF RN EA 15 MIN: HCPCS

## 2021-10-26 NOTE — Clinical Note
Thanks Bibi Lo for update and doing fall assessment  Kalee Rodriguez  ----- Message -----  From: Omkar Murry OT  Sent: 10/28/2021   8:47 AM EDT  To: Мария Goel PT      Ms. Beltran Aj reports that she had a fall on Friday 10/22/21 around 11:30 pm.  She states that she was laying across the foot of her bed when she dozed off. When she went to roll over, she rolled off the bed. Her son was home when pt fell. He was able to assist her with getting off the floor. No medical attention received. She denies having any injuries related to bruising or skin tears but c/o soreness in her upper legs. She is taking tylenol for pain and is currently at 0/10 pain. Education provided to lay in bed and to scoot to center to allow more room and reduce risk of falls. She is able to lay in bed and scoot to center without assistance.

## 2021-10-26 NOTE — HOME HEALTH
DOCTOR'S VISIT:  11/22/2021 9:00 AM Luke Suarez NP [facility] [place]     SUBJECTIVE:  Pt reports that she was laying cross ways on the bed watching TV, fell asleep, and rolled off the end of the bed to the floor on Fri 10/22, it was about 11:30 at night, and fell on her left side. \"I feel more strained in my legs from scooting on the floor out to the stairs to pull up on the banister with my son helping me up. \"     NARRATIVE:  Pt amb w BBC to open door for therapist and amb to the kitchen x apx 32 feet w SBA w wide TASHIA, shuffling gait indicative of continuing balance and hip girdle strength deficits. Pt education on changing location of TV, not laying across the bed, scooting into the middle of the bed so that she won't roll out of bed, and placing her RW beside the bed to alert her if she rolls. Pt verbalized understanding. Instructed in, gave handout for, and pt performed Standing HEP w BUE support for CHRIS LE x 10 ea: Hip abd, hip ext leaning on elbows due to LBP, slow marching, HS curls squeezing towel roll, quarter squats, and HR/TR w MC/VC for correct form and upright posture w TA draw. Pt needed 3 seated rest breaks due to fatigue from standing ther ex. Pt sit <> stand from tall kitchen stool x 3 w close SBA & VCs to reach back to stabilize stool as stool was wobbly and the chairs were too low for her to get up from. Pt reports sig  increase in fatigue from standing ther ex. CAREGIVER INVOLVEMENT/ASSISTANCE NEEDED FOR:  Pt's son and daughter in law assists w ADLs, medications, meals, and transportation. HOME HEALTH SUPPLIES by type and quantity ordered/delivered this visit include: None    ASSESSMENT AND PROGRESS TOWARD GOALS:    Progressing well toward goals for transfers, gait, balance, and strength w amb w BBC >30 feet w SBA and performing 6 BLE standing ther ex wo LOB.     Patient continues to have deficits in  transfers, gait, balance, stairs, and strength due to recent sepsis from UTI and chronic UTI infections. Patient will benefit from continued intervention with progression of all PT goals to improve functional independence, prevent falls, decrease burden of care, and improve quality of life. CONTINUED NEED FOR THE FOLLOWING SKILLS:  HH PT is medically necessary to  address pain, decreased strength, decreased independence with functional transfers, impaired gait, impaired stair negotiation, and impaired balance in order to improve functional independence, quality of life, return to PLOF, and reduce the risk for falls. PLAN FOR NEXT VISIT:  Dynamic and static balance    DISCHARGE PLANNING was discussed with the pt/caregiver:   Frequency: 2wk4, w 1wk1, 2wk2 remaining with anticipated DC on 11/12.

## 2021-10-26 NOTE — Clinical Note
no problem  ----- Message -----  From: Lory Riedel, PT  Sent: 10/28/2021   5:08 PM EDT  To: Raeann Alexandra, OT  Subject: RE:                                                Thanks Krystle Flores for update and doing fall assessment  Leonides Dorantes  ----- Message -----  From: Janusz Sun OT  Sent: 10/28/2021   8:47 AM EDT  To: Davian Sportsman, PT      Ms. Cindy Helms reports that she had a fall on Friday 10/22/21 around 11:30 pm.  She states that she was laying across the foot of her bed when she dozed off. When she went to roll over, she rolled off the bed. Her son was home when pt fell. He was able to assist her with getting off the floor. No medical attention received. She denies having any injuries related to bruising or skin tears but c/o soreness in her upper legs. She is taking tylenol for pain and is currently at 0/10 pain. Education provided to lay in bed and to scoot to center to allow more room and reduce risk of falls. She is able to lay in bed and scoot to center without assistance.

## 2021-10-26 NOTE — HOME HEALTH
Caregiver: son and daughter in law to assist with daily meals, ADLs, medications, groceries/errands, appointments, run errands, and MD appt. Skilled care provided: Teaching, medication management, completed assessment    Medications reveiwed, all medications are at home. Education on importance of compliance, timely taking all prescribed meds, proper dosage and freq, No new medication added. Medications are effective at this time. SN Instructed patient about the Eliquis ( apixaban ) this is helps to prevent that platelets in your blood from sticking together and forming a blood clot. Eliquis is used to lower the risk of stroke caused by a blood clot in people with a heart rhythm disorder called atrial fibrillation. Because Eliquis keeps your blood from coagulating ( clotting ) to prevent unwanted blood clots, this medicine can also make it easier for you to bleed, even from a minor injury such as a fall or a bump on the head. Do not stop taking Eliquis unless your doctor tells you to. Stopping suddenly can increase your risk of blood clot or stroke    Patient education provided this visit to include: SN instructed patient that fluid restrictions are usually necessary when dialysis is initiated, especially if dialysis only occurs three days a week and if urine production is decreased. Build up of fluid can lead to shortness of breath, swelling, and high blood pressure. There is a limit to the amount of fluid that can be safely removed during dialysis. If fluid limits are exceeded and extra water must be removed, negative effects such as muscle cramping, low blood pressure leading to nausea, weakness, dizziness, and possibly extra dialysis sessions to remove the fluid. Home health supplies by type and quantity ordered/delivered this visit include: in home    Progress towards goals: Partially met.      Continue the need of skilled care: SN, PT/OT    Home exercise program/Homework provided: HEP deep breathing exercises 10x when having SOB, pain and anxiety. Discharge planning discussed with patient and caregiver. When caregiver is able to manage disease processes and medications independently or patients health condition stable.

## 2021-10-26 NOTE — Clinical Note
Ms. Sophie Mcmullen reports that she had a fall on Friday 10/22/21 around 11:30 pm.  She states that she was laying across the foot of her bed when she dozed off. When she went to roll over, she rolled off the bed. Her son was home when pt fell. He was able to assist her with getting off the floor. No medical attention received. She denies having any injuries related to bruising or skin tears but c/o soreness in her upper legs. She is taking tylenol for pain and is currently at 0/10 pain. Education provided to lay in bed and to scoot to center to allow more room and reduce risk of falls. She is able to lay in bed and scoot to center without assistance.

## 2021-10-28 ENCOUNTER — HOME CARE VISIT (OUTPATIENT)
Dept: HOME HEALTH SERVICES | Facility: HOME HEALTH | Age: 76
End: 2021-10-28
Payer: MEDICARE

## 2021-10-28 ENCOUNTER — HOME CARE VISIT (OUTPATIENT)
Dept: SCHEDULING | Facility: HOME HEALTH | Age: 76
End: 2021-10-28
Payer: MEDICARE

## 2021-10-28 VITALS
TEMPERATURE: 97.7 F | DIASTOLIC BLOOD PRESSURE: 67 MMHG | OXYGEN SATURATION: 98 % | SYSTOLIC BLOOD PRESSURE: 107 MMHG | HEART RATE: 82 BPM

## 2021-10-28 PROCEDURE — G0299 HHS/HOSPICE OF RN EA 15 MIN: HCPCS

## 2021-10-28 NOTE — HOME HEALTH
Caregiver involvement: Santos Bland (son) lives with pt and provides daily emotional support. Medications reconciled and all medications are available in the home this visit. The following education was provided regarding medications, medication interactions, and look a like medications: Continue as directed by MD.  Medications  are effective at this time. Home health supplies by type and quantity ordered/delivered this visit include: na    Patient education provided this visit:  Educated pt on use of rollator with ambulation and transfers in the home to reduce risk of falls. Encouraged use of the w/c for rest/energy conservation when feeling fatigued. Progress toward goals: Ms. Jesús Fu is cooperative to therapy recommendation to reduce risk of falls from the bed due to history of recent fall. She is cooperative with HEP plan and for shower assessment next visit. Home exercise program: complete with S and use of handout for carryover of HEP to improve UE strength and preserve ROM of the R shoulder for ease with ADL participation. Continued need for the following skills: Nursing, Physical Therapy and Occupational Therapy    The following discharge planning was discussed with the pt/caregiver:  2w1, 1w1 remaining with plans to discharge to self once maximal potential has been achieved with self care and functional mobility in the home. Plan for shower ADL next visit. POST FALL ASSESSMENT:    POST FALL:   Date and Time of Fall: 10/22/21 @  11:30 pm  SOC/[place] Date: 10/16/21  Fall observed by [facility] Staff? no  Describe Event and Document any re-training or treatment plan modifications indicated:  Ms. Jesús Fu reports that she had a fall on Friday 10/22/21 around 11:30 pm.  She states that she was laying across the foot of her bed when she dozed off. When she went to roll over, she rolled off the bed. Her son was home when pt fell. He was able to assist her with getting off the floor.   No medical attention received. She denies having any injuries related to bruising or skin tears but c/o soreness in her upper legs. She is taking tylenol for pain and is currently at 0/10 pain. Education provided to lay in bed and to scoot to center to allow more room and reduce risk of falls. She is able to lay in bed and scoot to center without assistance. Response to re-training or treatment plan modifications: Pt was receptive of education and reports that she plans to be compliant with recommendations  Assisted Devices used by patient prior to fall: RW and SBQC  Was equipment in use at time of fall? (Yes / No) no due to pt in bed  Injury (Yes / No), If yes, describe:  no injuries  Emergent Care Received: (Yes / No), if yes, describe: no emergent care received  Was patient identified as High Risk for falls? (Yes / No) yes  MD Notified (name and time): notification sent to MD via EPIC.

## 2021-10-29 NOTE — HOME HEALTH
After calling last night to schedule pt for today, recieved email from Intake that pt had called in to cancel visit for today. As pt has dialysis M, W, & F, pt might not be able to be seen this week due to only the priority s/p surgical pts to be seen on the weekend.

## 2021-11-02 ENCOUNTER — HOME CARE VISIT (OUTPATIENT)
Dept: SCHEDULING | Facility: HOME HEALTH | Age: 76
End: 2021-11-02
Payer: MEDICARE

## 2021-11-02 VITALS
OXYGEN SATURATION: 97 % | HEART RATE: 86 BPM | TEMPERATURE: 97.7 F | DIASTOLIC BLOOD PRESSURE: 63 MMHG | SYSTOLIC BLOOD PRESSURE: 116 MMHG

## 2021-11-02 PROCEDURE — G0157 HHC PT ASSISTANT EA 15: HCPCS

## 2021-11-02 PROCEDURE — G0152 HHCP-SERV OF OT,EA 15 MIN: HCPCS

## 2021-11-02 NOTE — HOME HEALTH
DOCTOR'S VISIT:  11/22/2021 9:00 AM Celso Suarez NP [facility] [place]     SUBJECTIVE:  Pt reports that she doesn't feel well and thinks that she has a cold. Pt states that she has had both COVID vaccine. Pt states that the 2 new meds have given herbad  headaches and c/o ligheadedness for 2 weeks, which is why she missed the prior PT visit. Pt states that her son is leaving for [place]  the week before Thanksgiving to try to sell her house there. NARRATIVE:  Pt educated on plan for when son goes out of town for a week, and she needs to get to the dialysis van down and up the 3 front steps. Planned and problem solved w pt all aspects of opening front door so the dogs won't get out, managing RW w bag for HD, and for descent & ascent of the front stairs w 1 handrail. Pt amb w RW out front door,  w bending to use latch to keep door open & reach back to close front door, put RW aside, descend 3 steps w 1 handrail w lateral step to gait, and was able to reach up the stairs to get RW down as per LPTA's instruction. Pt amb w RW w SBA >100 feet along uneven sidewalk w tree debris to include front doorstep and down & up 3 front steps. Instructed pt in folding RW to use it at a modified cane to get it up the stairs and to assist balance, which pt did w step-to gait, and was able to unfold it on the small porch w instruction to lean against rail so as to not fall forwards. Pt had min unsteadiness but no LOBs wall activities. Pt reports that fatigue and pain did not increase. Pt education on not leaning down the stairs w RW as might fall forward, and that she might need smaller bag of blankets for HD. Advised pt to practice w her son, and pt verbalized that she would. CAREGIVER INVOLVEMENT/ASSISTANCE NEEDED FOR:  Pt's son and daughter in law assists w ADLs, medications, meals, and transportation.     HOME HEALTH SUPPLIES by type and quantity ordered/delivered this visit include: None    ASSESSMENT AND PROGRESS TOWARD GOALS:    Progressing well toward goals for transfers, gait, balance, and strength w amb w BBC >30 feet w SBA and performing 6 BLE standing ther ex wo LOB. Patient continues to have deficits in  transfers, gait, balance, stairs, and strength due to recent sepsis from UTI and chronic UTI infections. Patient will benefit from continued intervention with progression of all PT goals to improve functional independence, prevent falls, decrease burden of care, and improve quality of life. CONTINUED NEED FOR THE FOLLOWING SKILLS:  HH PT is medically necessary to  address pain, decreased strength, decreased independence with functional transfers, impaired gait, impaired stair negotiation, and impaired balance in order to improve functional independence, quality of life, return to PLOF, and reduce the risk for falls. PLAN FOR NEXT VISIT:  Dynamic and static balance. Check on progress w stairs. DISCHARGE PLANNING was discussed with the pt/caregiver:   Frequency: 2wk4, w 1wk1, 2wk1 remaining with anticipated DC on 11/12.

## 2021-11-02 NOTE — Clinical Note
thanks [address] Meghan Greene   
----- Message ----- From: Jose Ramon Biggs OT Sent: 11/3/2021   9:04 AM EDT To: Pushpa Pritchard, PT Occupational Therapy Discharge - Ms. Stern was seen 3/6 visits to maximize her safety and participation with self care. She reports that she has returned to doing all self care/IADL tasks without assistance from her family and is requesting early d/c. Today, she is doing laundry and was received standing to sweep the floor. Did strongly suggest pt sit during functional tasks such as laundry and sweeping to reduce risk of falls and for energy conservation. She was able to use her chair lift to ascend to second floor and demonstrate ability to get in and out of her tub with a tub seat. She is now performing grooming, bathing (at the tub/shower level), dressing and toileting independently. Strongly advised pt to continue using the RW with all transfers due to history of falls. No further skilled OT is indicated.

## 2021-11-02 NOTE — Clinical Note
Thank you. 
 
 
----- Message ----- From: Bi Cotter OT Sent: 11/3/2021   9:04 AM EDT To: Dylan Leslie PTA Occupational Therapy Discharge - Ms. Stern was seen 3/6 visits to maximize her safety and participation with self care. She reports that she has returned to doing all self care/IADL tasks without assistance from her family and is requesting early d/c. Today, she is doing laundry and was received standing to sweep the floor. Did strongly suggest pt sit during functional tasks such as laundry and sweeping to reduce risk of falls and for energy conservation. She was able to use her chair lift to ascend to second floor and demonstrate ability to get in and out of her tub with a tub seat. She is now performing grooming, bathing (at the tub/shower level), dressing and toileting independently. Strongly advised pt to continue using the RW with all transfers due to history of falls. No further skilled OT is indicated.

## 2021-11-02 NOTE — Clinical Note
Occupational Therapy Discharge - Ms. Stern was seen 3/6 visits to maximize her safety and participation with self care. She reports that she has returned to doing all self care/IADL tasks without assistance from her family and is requesting early d/c. Today, she is doing laundry and was received standing to sweep the floor. Did strongly suggest pt sit during functional tasks such as laundry and sweeping to reduce risk of falls and for energy conservation. She was able to use her chair lift to ascend to second floor and demonstrate ability to get in and out of her tub with a tub seat. She is now performing grooming, bathing (at the tub/shower level), dressing and toileting independently. Strongly advised pt to continue using the RW with all transfers due to history of falls. No further skilled OT is indicated.

## 2021-11-02 NOTE — HOME HEALTH
Caregiver involvement:  Deisy Tao (son) lives with pt and provides daily emotional support with self care and mobility. Medications reconciled and all medications are not available in the home this visit. The following education was provided regarding medications, medication interactions, and look a like medications: Continue as directed by MD.  Medications  are effective at this time. Pt states that she is waiting on refill of calcitrol, vitamin b12, nephrovite and probiotic    Home health supplies by type and quantity ordered/delivered this visit include: na    Patient education provided this visit:  Instructed pt to use the RW with all transfers to reduce risk of falls. Suggested sitting for energy conservation during IADL tasks such as laundry and sweeping to reduce risk of falls and fatigue. Progress toward goals: Ms. Kathy Zazueta has met goals and returned to independence with self care and basic transfers in the home to the tub, toilet and bed. Home exercise program: completes daily independently. HEP handout in the home. Continued need for the following skills: Physical Therapy    The following discharge planning was discussed with the pt/caregiver: DC early per pt request.  She is performing her own daily tasks without assistance from family. Distant S is suggested with cues to remind pt on safety with use of RW with all transfers and to sit for prolonged tasks. MD office notified of OT early discharge.

## 2021-11-04 ENCOUNTER — HOME CARE VISIT (OUTPATIENT)
Dept: SCHEDULING | Facility: HOME HEALTH | Age: 76
End: 2021-11-04
Payer: MEDICARE

## 2021-11-04 VITALS
HEART RATE: 96 BPM | TEMPERATURE: 98.7 F | OXYGEN SATURATION: 98 % | DIASTOLIC BLOOD PRESSURE: 84 MMHG | SYSTOLIC BLOOD PRESSURE: 132 MMHG | RESPIRATION RATE: 18 BRPM

## 2021-11-04 PROCEDURE — G0300 HHS/HOSPICE OF LPN EA 15 MIN: HCPCS

## 2021-11-04 NOTE — Clinical Note
If nursing is in 2 weeks post us then it is not our job to get [facility] signed. But we need to make sure they are staying in. Rox Baez  ----- Message -----  From: Aloha Angelucci, PTA  Sent: 11/7/2021   4:36 PM EST  To: Erinn Lemon, PT      Pt's son called to ask to cancel PT for 11/4 due to stomach flu. Informed him that PT treatment would be ending next week on 11/11 w SN cont for 2 more weeks. Son verbalized understanding. This is pt's second MV as she also had one last Thursday. Will have her sign the paper copy for today's date, if that's ok?

## 2021-11-04 NOTE — HOME HEALTH
Skilled reason for visit: Skilled nursing assessment, medication review and education    Caregiver involvement: Adult son lives in the home and assists patient    Medications reviewed and all medications are available in the home this visit. The following education was provided regarding medications, medication interactions, and look alike medications (specify): tylenol, use for pain relief, importance of not exceeding 3000mg daily and potential for nausea. Medications  are effective at this time. Home health supplies by type and quantity ordered/delivered this visit include: n/a    Patient education provided this visit: discussed fall precautions in detail- having lighted hallways, removing throw rugs, monitoring medication that may alter mental status.     Skilled Care Performed this visit: Skilled nursing assessment, medication review and education    Patient's Progress towards personal goals: Patient continues to comply with physicians orders and keep medical appointments to work toward accomplishing goals set and discharge    Home exercise program: sn instructed patient to perform deep breathing exercises, 5-6 breaths 5 x daily to promote air exchange and prevent pneumonia     Continued need for the following skills: Nursing    Plan for next visit: Patient continues to comply with physicians orders and keep medical appointments to work toward accomplishing goals set and discharge    Patient and/or caregiver notified and agrees to changes in the Plan of Care N/A      The following discharge planning was discussed with the pt/caregiver: Patient to discharge when goals are met and patient is able to independently manage medications

## 2021-11-07 ENCOUNTER — APPOINTMENT (OUTPATIENT)
Dept: GENERAL RADIOLOGY | Age: 76
End: 2021-11-07
Attending: STUDENT IN AN ORGANIZED HEALTH CARE EDUCATION/TRAINING PROGRAM
Payer: MEDICARE

## 2021-11-07 ENCOUNTER — HOSPITAL ENCOUNTER (EMERGENCY)
Age: 76
Discharge: HOME | End: 2021-11-07
Attending: STUDENT IN AN ORGANIZED HEALTH CARE EDUCATION/TRAINING PROGRAM
Payer: MEDICARE

## 2021-11-07 ENCOUNTER — APPOINTMENT (OUTPATIENT)
Dept: CT IMAGING | Age: 76
End: 2021-11-07
Attending: STUDENT IN AN ORGANIZED HEALTH CARE EDUCATION/TRAINING PROGRAM
Payer: MEDICARE

## 2021-11-07 VITALS
TEMPERATURE: 97.9 F | RESPIRATION RATE: 16 BRPM | HEART RATE: 85 BPM | OXYGEN SATURATION: 100 % | HEIGHT: 62 IN | WEIGHT: 205.03 LBS | BODY MASS INDEX: 37.73 KG/M2 | SYSTOLIC BLOOD PRESSURE: 106 MMHG | DIASTOLIC BLOOD PRESSURE: 63 MMHG

## 2021-11-07 DIAGNOSIS — M25.559 HIP PAIN: Primary | ICD-10-CM

## 2021-11-07 PROCEDURE — 70450 CT HEAD/BRAIN W/O DYE: CPT

## 2021-11-07 PROCEDURE — 74011000250 HC RX REV CODE- 250: Performed by: STUDENT IN AN ORGANIZED HEALTH CARE EDUCATION/TRAINING PROGRAM

## 2021-11-07 PROCEDURE — 73502 X-RAY EXAM HIP UNI 2-3 VIEWS: CPT

## 2021-11-07 PROCEDURE — 74011250637 HC RX REV CODE- 250/637: Performed by: STUDENT IN AN ORGANIZED HEALTH CARE EDUCATION/TRAINING PROGRAM

## 2021-11-07 PROCEDURE — 99284 EMERGENCY DEPT VISIT MOD MDM: CPT

## 2021-11-07 RX ORDER — LIDOCAINE 4 G/100G
1 PATCH TOPICAL EVERY 24 HOURS
Status: DISCONTINUED | OUTPATIENT
Start: 2021-11-07 | End: 2021-11-07 | Stop reason: HOSPADM

## 2021-11-07 RX ORDER — LIDOCAINE 50 MG/G
PATCH TOPICAL
Qty: 1 EACH | Refills: 0 | Status: SHIPPED | OUTPATIENT
Start: 2021-11-07 | End: 2022-11-03

## 2021-11-07 RX ORDER — HYDROCODONE BITARTRATE AND ACETAMINOPHEN 5; 325 MG/1; MG/1
2 TABLET ORAL ONCE
Status: COMPLETED | OUTPATIENT
Start: 2021-11-07 | End: 2021-11-07

## 2021-11-07 RX ORDER — ACETAMINOPHEN 500 MG
500 TABLET ORAL
Qty: 30 TABLET | Refills: 0 | Status: SHIPPED | OUTPATIENT
Start: 2021-11-07

## 2021-11-07 RX ORDER — METHOCARBAMOL 500 MG/1
1000 TABLET, FILM COATED ORAL ONCE
Status: COMPLETED | OUTPATIENT
Start: 2021-11-07 | End: 2021-11-07

## 2021-11-07 RX ORDER — IBUPROFEN 600 MG/1
600 TABLET ORAL ONCE
Status: COMPLETED | OUTPATIENT
Start: 2021-11-07 | End: 2021-11-07

## 2021-11-07 RX ADMIN — METHOCARBAMOL 1000 MG: 500 TABLET ORAL at 11:53

## 2021-11-07 RX ADMIN — HYDROCODONE BITARTRATE AND ACETAMINOPHEN 2 TABLET: 5; 325 TABLET ORAL at 09:56

## 2021-11-07 RX ADMIN — IBUPROFEN 600 MG: 600 TABLET ORAL at 11:53

## 2021-11-07 NOTE — CASE COMMUNICATION
Pt's son called to ask to cancel PT for 11/4 due to stomach flu. Informed him that PT treatment would be ending next week on 11/11 w SN cont for 2 more weeks. Son verbalized understanding. This is pt's second MV as she also had one last Thursday. Will have her sign the paper copy for today's date, if that's ok?

## 2021-11-07 NOTE — ED NOTES
Reviewed discharge instructions with pt. Pt verbalized understanding of instructions. Pt wheeled to waiting room by nurse. Pt unable to get into family personal vehicle. Family to return with different vehicle. Pt in moderate pain upon discharge. [Risk of tobacco use and health benefits of smoking cessation discussed] : Risk of tobacco use and health benefits of smoking cessation discussed [Cessation strategies including cessation program discussed] : Cessation strategies including cessation program discussed [Use of nicotine replacement therapies and other medications discussed] : Use of nicotine replacement therapies and other medications discussed [Encouraged to pick a quit date and identify support needed to quit] : Encouraged to pick a quit date and identify support needed to quit [Potential consequences of obesity discussed] : Potential consequences of obesity discussed [Benefits of weight loss discussed] : Benefits of weight loss discussed [Encouraged to increase physical activity] : Encouraged to increase physical activity [ - Annual Lung Cancer Screening/Share Decision Making Discussion] : Annual Lung Cancer Screening/Share Decision Making Discussion. (I have advised this patient to have a Low Dose CT (LDCT) scan of the lungs and have discussed the following with the patient in a shared decision making discussion:   Benefits of Detection and Early Treatment: There is adequate evidence that annual screening for lung cancer with LDCT in a population of high-risk persons can prevent a substantial number of lung cancer–related deaths. The magnitude of benefit depends on the individual patient's risk for lung cancer, as those who are at highest risk are most likely to benefit. Screening cannot prevent most lung cancer–related deaths, and does not replace smoking cessation. Harms of Detection and Early Intervention and Treatment: The harms associated with LDCT screening include false-negative and false-positive results, incidental findings, over diagnosis, and radiation exposure. False-positive LDCT results occur in a substantial proportion of screened persons; 95% of all positive results do not lead to a diagnosis of cancer. In a high-quality screening program, further imaging can resolve most false-positive results; however, some patients may require invasive procedures. Radiation harms, including cancer resulting from cumulative exposure to radiation, vary depending on the age at the start of screening; the number of scans received; and the person's exposure to other sources of radiation, particularly other medical imaging.) [Willing to Quit Smoking] : Not willing to quit smoking [FreeTextEntry1] : 4

## 2021-11-07 NOTE — ED PROVIDER NOTES
HPI   Patient is a 42-year-old female who presents after a trip and fall with left hip pain. Patient states that she was talking to her daughter when she tripped over a curb and fell. She hit her head on the stove. She did not lose consciousness, or other than that happened, denies any headache or nausea and vomiting. She is on Eliquis. She states that she was unable to get up. With out any movement she does not have any significant hip but when she moves she is a severe 10-10 pain inner aspect of her left hip. She is still able to move it around and lift it. She has any tingling or numbness in her lower leg. She has have a history of degenerative disc disease in her lower back and has been having issues with sciatica pain and she has been experiencing. She is not attempted to take anything for it.   Past Medical History:   Diagnosis Date    Acidosis     Anemia     Arteriovenous fistula (HCC)     Chronic kidney disease     on HD at [address] [facility] on [address] on MWF. [phone]    Chronic pain     CKD (chronic kidney disease)     Diabetes ([address]     no meds now    ESRD (end stage renal disease) on dialysis ([address] 9/9/2021    HLD (hyperlipidemia)     HTN (hypertension)     Hyperparathyroidism due to renal insufficiency ([address]     Hypothyroid     Kidney stone     Lung mass     Recurrent UTI     Ureter, stricture     Uric acid nephrolithiasis     Urinary incontinence        Past Surgical History:   Procedure Laterality Date    HX APPENDECTOMY      HX CHOLECYSTECTOMY      HX GASTRIC BYPASS      Gastric stapling    HX KNEE ARTHROSCOPY      HX UROLOGICAL      right PCN placement    HX UROLOGICAL  07/23/2018    RIGHT URETEROSCOPY WITH HOLMIUM LASER    IR EXCHANGE NEPHRO PERC LT SI  2/21/2020    IR EXCHANGE NEPHRO PERC RT SI  4/13/2020    IR EXCHANGE NEPHRO PERC RT SI  7/17/2020    IR NEPHROSTOMY PERC RT PLC CATH  SI  10/14/2020    IR NEPHROURETERAL PERC RT PLC CATH NEW ACCESS  SI  4/30/2020    ND INTRO CATH DIALYSIS CIRCUIT DX ANGRPH FLUOR S&I Left 9/24/2020    FISTULOGRAM LEFT/poss permanent catheter placement performed by Ernie Willoughby MD at [facility]    VASCULAR SURGERY PROCEDURE UNLIST      lef AVF         Family History:   Problem Relation Age of Onset    Heart Surgery Sister        Social History     Socioeconomic History    Marital status: SINGLE     Spouse name: Not on file    Number of children: Not on file    Years of education: Not on file    Highest education level: Not on file   Occupational History    Not on file   Tobacco Use    Smoking status: Never Smoker    Smokeless tobacco: Never Used   Vaping Use    Vaping Use: Never used   Substance and Sexual Activity    Alcohol use: Never    Drug use: Never    Sexual activity: Not on file   Other Topics Concern    Not on file   Social History Narrative    Not on file     Social Determinants of Health     Financial Resource Strain:     Difficulty of Paying Living Expenses: Not on file   Food Insecurity:     Worried About [address] in the Last Year: Not on file    [address] in the Last Year: Not on file   Transportation Needs:     Lack of Transportation (Medical): Not on file    Lack of Transportation (Non-Medical):  Not on file   Physical Activity:     Days of Exercise per Week: Not on file    Minutes of Exercise per Session: Not on file   Stress:     Feeling of Stress : Not on file   Social Connections:     Frequency of Communication with Friends and Family: Not on file    Frequency of Social Gatherings with Friends and Family: Not on file    Attends Christian Services: Not on file    Active Member of Clubs or Organizations: Not on file    Attends Club or Organization Meetings: Not on file    Marital Status: Not on file   Intimate Partner Violence:     Fear of Current or Ex-Partner: Not on file    Emotionally Abused: Not on file    Physically Abused: Not on file    Sexually Abused: Not on file   Housing Stability:     Unable to Pay for Housing in the Last Year: Not on file    Number of Places Lived in the Last Year: Not on file    Unstable Housing in the Last Year: Not on file         ALLERGIES: Ciprofloxacin, Cyclopentolate, Iron sucrose, Midodrine, and Statins-hmg-coa reductase inhibitors    Review of Systems    Constitutional: No fever  HENT: No ear pain  Eyes: No change in vision  Respiratory: No SOB  Cardio: No chest pain  GI: No blood in stool  : No hematuria  MSK: Positive for back pain  Skin: No rashes  Neuro: No headache    Vitals:    11/07/21 0913   BP: 106/63   Pulse: 85   Resp: 16   Temp: 97.9 °F (36.6 °C)   SpO2: 100%   Weight: 93 kg (205 lb 0.4 oz)   Height: 5' 2\" (1.575 m)            Physical Exam   General: No acute distress  Head: Normocephalic, atraumatic  Psych: Cooperative and alert  Eyes: No scleral icterus, normal conjunctiva  ENT: Moist oral mucosa  Neck: Supple, nontender, normal range of motion  CV: Regular rate and rhythm, no pitting edema, palpable radial pulses  Pulm: Clear breath sounds bilaterally without any wheezing or rhonchi, normal respiratory rate  GI: Normal bowel sounds, soft, non-tender  MSK: Patient has mild tenderness to the medial upper thigh, no actual hip or pelvis tenderness. No tenderness to the knee. Patient normal range of motion of hips and knees bilaterally. Leg is symmetrical to the other side. Patient is able to bear weight however has difficulty walking and applying full pressure. Skin: No rashes  Neuro: Alert and conversive    MDM     Patient is a 57-year-old female who presents after a fall at home. She does not have any concerning prodromal symptoms a medical etiology for her follow-up seems to be stable. She did hit her head although she does not have any concerning findings of intracranial injury she is on Eliquis because this we will scan her head.   We will obtain x-rays of her hip and pelvis she is having tenderness here although my suspicion for fracture is low. Patient needs Norco for pain. Head CT shows no acute intracranial process. X-ray of the hip shows no acute fractures or dislocations. Pelvis within normal limits. On reexamination patient states that she is feeling little better. However the pain. Although she is a dialysis patient will give her a one-time dose of ibuprofen as well as methocarbamol to help with her symptoms. This was given patient is able to stand and move her leg more easily. At this point do feel that she is stable for discharge home. We did discuss importance of following up with her doctor or sports medicine if she continues to have pain. Patient stable for discharge at this time. Patient is in agreement with the plan to be discharged at this time. All the patient's questions were answered. Patient was given written instructions on the diagnosis, and states understanding of the plan moving forward. We did discuss important signs and symptoms that should prompt quick return to the emergency department. Disposition: Patient was discharged home in stable condition.   They will follow up with their primary care physician    Prescriptions: Tylenol and lidocaine patches    Diagnosis: Acute left hip pain, following fall  Procedures

## 2021-11-07 NOTE — ED TRIAGE NOTES
Pt arrived via [employer]. Per medic, pt tripped and fell this morning. Pt did hit her head, but denies LOC. Pt is taking Eliquis for A-Fib. Pt is complaining of left hip pain upon movement. Per medic, family states that pt has been falling often and has recent left leg drag.

## 2021-11-08 ENCOUNTER — HOME CARE VISIT (OUTPATIENT)
Dept: SCHEDULING | Facility: HOME HEALTH | Age: 76
End: 2021-11-08
Payer: MEDICARE

## 2021-11-08 VITALS
OXYGEN SATURATION: 97 % | SYSTOLIC BLOOD PRESSURE: 122 MMHG | TEMPERATURE: 96.8 F | DIASTOLIC BLOOD PRESSURE: 80 MMHG | HEART RATE: 78 BPM | RESPIRATION RATE: 16 BRPM

## 2021-11-08 VITALS
OXYGEN SATURATION: 96 % | SYSTOLIC BLOOD PRESSURE: 110 MMHG | TEMPERATURE: 98.6 F | DIASTOLIC BLOOD PRESSURE: 58 MMHG | RESPIRATION RATE: 17 BRPM | HEART RATE: 99 BPM

## 2021-11-08 PROCEDURE — G0151 HHCP-SERV OF PT,EA 15 MIN: HCPCS

## 2021-11-08 NOTE — Clinical Note
Do you know when this is? Maybe they will rethink leaving her right now. Char Jean-Baptiste  ----- Message -----  From: Gilberto Arguello PTA  Sent: 11/9/2021   5:48 AM EST  To: Rosemarie Almonte PT  Subject: RE:                                                Thanks for the update. He son and his wife are going out of town for over a week to [place] I think to seel her house. She's not going and is anxious about it.    ----- Message -----  From: Bety Berry PT  Sent: 11/8/2021   9:08 PM EST  To: Neris Garcia PTA      Odell,     I saw patient today. She was able to get off couch and into lift chair with her son's assist and use of towel to help get leverage to stand off low couch. Pt was in lift chair when PT arrived. 10/10 pain. Pt was able to perform AROM to LLE for ankle pumps, LAQ and hip flexion ( with AAROm to AROM) pt performed 10 reps of each. Pt has no bruising at all on face where she hit face first and nothing on her leg. Pt daughter in law was present and she was talking to her as she tripped over the threshold of the living room to the kitchen carrying dishes from upstairs and no device. Pt called EMT and went to ER; xray revealed no fracture in LLE or pelvis and CT scan of head clear. Pt was able to stand with me off lift chair with min/mod A but unable to take steps. We couldn't find her FWW and all she had was rollator and I dont think this was appropriate to try and walk her. So I am going back on Thursday to see her. She is trying to get transport to HD this week as she has not gone since Friday. I will keep you posted if I extend her into next week if she is not at baseline by Thursday.      Char Jean-Baptiste

## 2021-11-08 NOTE — Clinical Note
Thanks for the update. He son and his wife are going out of town for over a week to [place] I think to seel her house. She's not going and is anxious about it.    ----- Message -----  From: Scooter Morton, PT  Sent: 11/8/2021   9:08 PM EST  To: Dylan Leslie, APRIL      Lorne Bk,     I saw patient today. She was able to get off couch and into lift chair with her son's assist and use of towel to help get leverage to stand off low couch. Pt was in lift chair when PT arrived. 10/10 pain. Pt was able to perform AROM to LLE for ankle pumps, LAQ and hip flexion ( with AAROm to AROM) pt performed 10 reps of each. Pt has no bruising at all on face where she hit face first and nothing on her leg. Pt daughter in law was present and she was talking to her as she tripped over the threshold of the living room to the kitchen carrying dishes from upstairs and no device. Pt called EMT and went to ER; xray revealed no fracture in LLE or pelvis and CT scan of head clear. Pt was able to stand with me off lift chair with min/mod A but unable to take steps. We couldn't find her FWW and all she had was rollator and I dont think this was appropriate to try and walk her. So I am going back on Thursday to see her. She is trying to get transport to HD this week as she has not gone since Friday. I will keep you posted if I extend her into next week if she is not at baseline by Thursday.      Brandon Terry

## 2021-11-08 NOTE — Clinical Note
Oh that is sad. I will ask her tomorrow when I see her. Abelino Valenzuela  ----- Message -----  From: Warner Cobb PTA  Sent: 11/9/2021   4:34 PM EST  To: Sherryle Brakeman, PT  Subject: RE:                                                I think her son is leaving around Thanksgiving. I do hope they rethink it. She was questionable before the fall w leaving the house for HD, but it's impossible now. It has something to do w pt's house in [place]. She really wants \"to go home\". Her DIL has told her \"I have no empathy for anyone bc of all the pain I'm in\". So she won't help her either. Sad situation.    ----- Message -----  From: Aggie Lane PT  Sent: 11/9/2021   9:17 AM EST  To: Alisa Villa PTA  Subject: RE:                                                Do you know when this is? Maybe they will rethink leaving her right now. Abelino Hilljosé miguel  ----- Message -----  From: Warner Cobb PTA  Sent: 11/9/2021   5:48 AM EST  To: Sherryle Brakeman, PT  Subject: RE:                                                Thanks for the update. He son and his wife are going out of town for over a week to [place] I think to seel her house. She's not going and is anxious about it.    ----- Message -----  From: Aggie Lane PT  Sent: 11/8/2021   9:08 PM EST  To: APRIL Riconer Geovanny,     I saw patient today. She was able to get off couch and into lift chair with her son's assist and use of towel to help get leverage to stand off low couch. Pt was in lift chair when PT arrived. 10/10 pain. Pt was able to perform AROM to LLE for ankle pumps, LAQ and hip flexion ( with AAROm to AROM) pt performed 10 reps of each. Pt has no bruising at all on face where she hit face first and nothing on her leg. Pt daughter in law was present and she was talking to her as she tripped over the threshold of the living room to the kitchen carrying dishes from upstairs and no device.   Pt called EMT and went to ER; xray revealed no fracture in LLE or pelvis and CT scan of head clear. Pt was able to stand with me off lift chair with min/mod A but unable to take steps. We couldn't find her FWW and all she had was rollator and I dont think this was appropriate to try and walk her. So I am going back on Thursday to see her. She is trying to get transport to HD this week as she has not gone since Friday. I will keep you posted if I extend her into next week if she is not at baseline by Thursday.      Janis Jreonimo

## 2021-11-08 NOTE — Clinical Note
I think her son is leaving around Thanksgiving. I do hope they rethink it. She was questionable before the fall w leaving the house for HD, but it's impossible now. It has something to do w pt's house in [place]. She really wants \"to go home\". Her DIL has told her \"I have no empathy for anyone bc of all the pain I'm in\". So she won't help her either. Sad situation.    ----- Message -----  From: Naomi Cagle, PT  Sent: 11/9/2021   9:17 AM EST  To: Ankur Falk PTA  Subject: RE:                                                Do you know when this is? Maybe they will rethink leaving her right now. Eron Woods  ----- Message -----  From: Kailyn Cowan PTA  Sent: 11/9/2021   5:48 AM EST  To: Noelle Vee PT  Subject: RE:                                                Thanks for the update. He son and his wife are going out of town for over a week to [place] I think to seel her house. She's not going and is anxious about it.    ----- Message -----  From: Naomi Cagle, PT  Sent: 11/8/2021   9:08 PM EST  To: APRIL Randhawa,     I saw patient today. She was able to get off couch and into lift chair with her son's assist and use of towel to help get leverage to stand off low couch. Pt was in lift chair when PT arrived. 10/10 pain. Pt was able to perform AROM to LLE for ankle pumps, LAQ and hip flexion ( with AAROm to AROM) pt performed 10 reps of each. Pt has no bruising at all on face where she hit face first and nothing on her leg. Pt daughter in law was present and she was talking to her as she tripped over the threshold of the living room to the kitchen carrying dishes from upstairs and no device. Pt called EMT and went to ER; xray revealed no fracture in LLE or pelvis and CT scan of head clear. Pt was able to stand with me off lift chair with min/mod A but unable to take steps. We couldn't find her FWW and all she had was rollator and I dont think this was appropriate to try and walk her.   So I am going back on Thursday to see her. She is trying to get transport to HD this week as she has not gone since Friday. I will keep you posted if I extend her into next week if she is not at baseline by Thursday.      Nahed Richardson

## 2021-11-08 NOTE — HOME HEALTH
Caregiver involvement: COOKS, CLEANS AND TAKES PATIENT TO MD APPT. Medications reconciled and all medications are available in the home this visit. The following education was provided regarding medications, medication interactions, and look a like medications: REVIEWED MEDICATIONS WITH PATIENT. Medications  are effective at this time. Home health supplies by type and quantity ordered/delivered this visit include: NA    Patient education provided this visit: TAUGHT ON S/S OF LOW BLOD PRESSURE AND S/S OF SEPSIS. INSTRUCTED ON IMPORTANCE OF MEDICATION AND DIETARY COMPLIANCY. INSTRUCTED ON WHEN TO NOTIFY MD OF CHANGES IN MEDICAL STATUS R/T SEPSIS AND HYPOTENSON. UTI HAS IMPROVED, BLOOD PRESSURE FLUCUATES UP AND DOWN. Progress toward goals: IMPROVED BLOOD PRESSURE. Home exercise program: BREATHING EXERCISES TO HELP PREVENT RESPIRATORY PROBLEMS.     Continued need for the following skills: Nursing    The following discharge planning was discussed with the pt/caregiver: PATIENT WILL BE DISCHARGED ONCE ALL GOALS HAVE BEEN MET AND MEDICALLY STABLE

## 2021-11-09 NOTE — HOME HEALTH
PT REASSESSMENT/SUPERVISORY VISIT and FALL ASSESSMENT-  Maira Michael GARCIA NOT present for reassessment   SUBJECTIVE: \"I fell trying to carry dishes into the kitchen from my room upstairs. I wasnt using a device. \"  Pt reports fall into kitchen landing flat on face after she has been having trouble with L foot drag due to sciatica and it caught on the theshold. EMT called and pt sent to ER. Hip and pelvis negative for fracture. Pt in 10/10 pain. Dr. Corazon Lentz notified of fall in case commuication as unable to reach office today after visit. CAREGIVER ASSISTANCE NEEDED FOR: meals, transportation, mobility, medications, ADLs  MEDICATIONS REVIEWED AND UPDATED: Medications reconciled and all medications are available in the home this visit. The following education was provided regarding medications, medication interactions, and look a like medications: Continue medication as prescribed by MD. Medications are effective at this time. Tylenol 500 mg added to chart. Pt awaiting Lidocaine patch for pain also . Has not received from pharmacy  WOUNDS: none noted or reported. No bruising noted on L thigh/groin/hip area or any UE.   ROM: BUE and LE ROM WNL. See strength section for details. BED MOBILITY:NT - unable to get upstairs. TRANSFERS:    see goals section for details  GAIT TRAINING: unable to ambulate due to pain today. PT attempt with pt with rollator and unable to take any steps. STAIRS: NT  BALANCE: poor to fair -  with rollator due to pain. PATIENT/CAREGIVER EDUCATION PROVIDED THIS VISIT: reeducation on HEP and how to self assist until CG can be retrained, ice to L LE with barrier placed oin between, safety with transfers and use of lift chair to help assist if only way to stand at this time, weight bearing and weight shifting to see if weight can be placed on LLE. Will need more education with CG/family due to mod A for transfers with lift chair and not ambulating.    HEP consisting of:  supine, seated per instructions. ASSESSMENT AND PROGRESS TOWARD GOALS: Pt was making good gains toward LTG with plan to DC this Thursday. However since fall yesterday and increased pain to 10/10, mod A for transfers, and inability to ambulate pt has had a set back and may need extension of Home PT services. Will reassess on Thursday. CONTINUED NEED FOR THE FOLLOWING SKILLS: HH PT is medically necessary to address LLE pain,  decreased strength, decreased independence with functional transfers, impaired gait, impaired stair negotiation, and impaired balance in order to improve functional independence, quality of life, return to PLOF, reduce the risk for falls, and reduce pain. PLAN: PT to reassess pt on Thursday after HD on Wedn. to see if pt has returned to PLOF. As pt was suppose to be DC on Thursday but since recent fall may need extension.      DISCHARGE PLANNING DISCUSSED: Discharge to self and family under MD supervision once all goals have been met or patient has reached maximum potential.

## 2021-11-09 NOTE — CASE COMMUNICATION
Dear Dr. Erica Gupta ,     This message is to inform you that your patient, Fozia Ro, , 41, has had a fall yesterday AM on 21 which EMT did have to come to home to help pt get up as well as take t to ER to get xray and CT scan of head due to hitting head. Pt denies LOC. Xray were negative of frature and pelvis appears normal er scans. Pt CT scan also clear. Pt son called into office today to report fall and also that he co uld not get his mother off couch and into w/c or chair. By the time PT arrived pt was in lift chair and pt was able to move LLE with pain but through full ROM, transfer with assist but unable to take any steps but was bearing weight. Pt is reporting 10/10 pain. Pt was given ice to help with pain, as well as Tylenol 500 mg. Pt is awaiting to  Lidocaine patches from pharmacy to help in pain relief. PT is due back on Thursday  after HD this week to reasssess and may request more HOme PT if pt has not return to PLOF. Any questions please contact me at [phone].     Sincerely,     Мария Goel, PT

## 2021-11-09 NOTE — CASE COMMUNICATION
Sally Pérez saw patient today. She was able to get off couch and into lift chair with her son's assist and use of towel to help get leverage to stand off low couch. Pt was in lift chair when PT arrived. 10/10 pain. Pt was able to perform AROM to LLE for ankle pumps, LAQ and hip flexion ( with AAROm to AROM) pt performed 10 reps of each. Pt has no bruising at all on face where she hit face first and nothing on her leg. Pt daughter i n law was present and she was talking to her as she tripped over the threshold of the living room to the kitchen carrying dishes from upstairs and no device. Pt called EMT and went to ER; xray revealed no fracture in LLE or pelvis and CT scan of head clear. Pt was able to stand with me off lift chair with min/mod A but unable to take steps. We couldn't find her FWW and all she had was rollator and I dont think this was appropriate to  try and walk her. So I am going back on Thursday to see her. She is trying to get transport to HD this week as she has not gone since Friday. I will keep you posted if I extend her into next week if she is not at baseline by Thursday.      Janene Weaver

## 2021-11-10 ENCOUNTER — HOSPITAL ENCOUNTER (EMERGENCY)
Age: 76
Discharge: HOME | DRG: 308 | End: 2021-11-10
Attending: EMERGENCY MEDICINE
Payer: MEDICARE

## 2021-11-10 VITALS
HEART RATE: 86 BPM | DIASTOLIC BLOOD PRESSURE: 61 MMHG | OXYGEN SATURATION: 95 % | RESPIRATION RATE: 19 BRPM | SYSTOLIC BLOOD PRESSURE: 135 MMHG | TEMPERATURE: 97.6 F

## 2021-11-10 DIAGNOSIS — N18.6 ESRD (END STAGE RENAL DISEASE) (HCC): Primary | ICD-10-CM

## 2021-11-10 LAB
ANION GAP SERPL CALC-SCNC: 14 MMOL/L (ref 3–18)
BASOPHILS # BLD: 0.1 K/UL (ref 0–0.1)
BASOPHILS NFR BLD: 1 % (ref 0–2)
BUN SERPL-MCNC: 89 MG/DL (ref 7–18)
BUN/CREAT SERPL: 9 (ref 12–20)
CALCIUM SERPL-MCNC: 7.2 MG/DL (ref 8.5–10.1)
CHLORIDE SERPL-SCNC: 101 MMOL/L (ref 100–111)
CO2 SERPL-SCNC: 23 MMOL/L (ref 21–32)
CREAT SERPL-MCNC: 10.3 MG/DL (ref 0.6–1.3)
DIFFERENTIAL METHOD BLD: ABNORMAL
EOSINOPHIL # BLD: 0.2 K/UL (ref 0–0.4)
EOSINOPHIL NFR BLD: 3 % (ref 0–5)
ERYTHROCYTE [DISTWIDTH] IN BLOOD BY AUTOMATED COUNT: 15.5 % (ref 11.6–14.5)
GLUCOSE SERPL-MCNC: 116 MG/DL (ref 74–99)
HCT VFR BLD AUTO: 29.5 % (ref 35–45)
HGB BLD-MCNC: 9.3 G/DL (ref 12–16)
LYMPHOCYTES # BLD: 2.2 K/UL (ref 0.9–3.6)
LYMPHOCYTES NFR BLD: 26 % (ref 21–52)
MCH RBC QN AUTO: 30.4 PG (ref 24–34)
MCHC RBC AUTO-ENTMCNC: 31.5 G/DL (ref 31–37)
MCV RBC AUTO: 96.4 FL (ref 78–100)
MONOCYTES # BLD: 0.7 K/UL (ref 0.05–1.2)
MONOCYTES NFR BLD: 9 % (ref 3–10)
NEUTS SEG # BLD: 5.1 K/UL (ref 1.8–8)
NEUTS SEG NFR BLD: 60 % (ref 40–73)
PLATELET # BLD AUTO: 282 K/UL (ref 135–420)
PMV BLD AUTO: 9.9 FL (ref 9.2–11.8)
POTASSIUM SERPL-SCNC: 4.7 MMOL/L (ref 3.5–5.5)
RBC # BLD AUTO: 3.06 M/UL (ref 4.2–5.3)
SODIUM SERPL-SCNC: 138 MMOL/L (ref 136–145)
WBC # BLD AUTO: 8.5 K/UL (ref 4.6–13.2)

## 2021-11-10 PROCEDURE — 80048 BASIC METABOLIC PNL TOTAL CA: CPT

## 2021-11-10 PROCEDURE — 5A1D70Z PERFORMANCE OF URINARY FILTRATION, INTERMITTENT, LESS THAN 6 HOURS PER DAY: ICD-10-PCS | Performed by: INTERNAL MEDICINE

## 2021-11-10 PROCEDURE — 90935 HEMODIALYSIS ONE EVALUATION: CPT

## 2021-11-10 PROCEDURE — 99283 EMERGENCY DEPT VISIT LOW MDM: CPT

## 2021-11-10 PROCEDURE — 85025 COMPLETE CBC W/AUTO DIFF WBC: CPT

## 2021-11-10 RX ORDER — SODIUM CHLORIDE 9 MG/ML
250 INJECTION, SOLUTION INTRAVENOUS
Status: DISCONTINUED | OUTPATIENT
Start: 2021-11-10 | End: 2021-11-11 | Stop reason: HOSPADM

## 2021-11-10 NOTE — DIALYSIS
ACUTE HEMODIALYSIS FLOW SHEET    HEMODIALYSIS ORDERS: Physician: Dr. Purdy Needs: Revaclear   Duration: 3 hr  BFR: 350   DFR: 600   Dialysate:  Temp 36   K+   2    Ca+  2.5   Na 138   Bicarb 35   Wt Readings from Last 1 Encounters:   11/07/21 93 kg (205 lb 0.4 oz)    Patient Chart [x]   Unable to Obtain []  Dry weight/UF Goal: 2000 ml   Heparin []  Bolus    Units    [] Hourly    Units    [x]None       Pre BP:   123/93    Pulse:  68   Respirations: 18    Temperature:  97.7  [x] Oral [] Axillary  Tx: NSS   ml/Bolus   [x] N/A   Labs: []  Pre  []  Post:   [x] N/A   Additional Orders(medications, blood products, hypotension management): [] Yes   [x] No     [x]  [facility] Consent Verified     GRAFT/FISTULA ACCESS:   []N/A     []Right     [x]Left     [x]UE     []LE   []AVG   [x]AVF     []Buttonhole    [x]Medical Aseptic Prep Utilized   [x]No S/S infection  []Redness  []Drainage []Cultured  [] Swelling  [] Pain  Bruit:   [x] Strong    [] Weak       Thrill :   [x] Strong    [] Weak     Needle Gauge: 15   Length: 1 inch   If access problem,  notified: []Yes     [x]N/A     Please describe access if present and not used: N/A       GENERAL ASSESSMENT:    LUNGS:   SaO2%  98    [x] Clear  [] Coarse  [] Crackles  [] Wheezing                                                [] Diminished     Location : []RLL   []LLL    []RUL  []LALI   Cough: []Productive  []Dry  [x]N/A   Respirations:  [x]Easy  []Labored   Therapy:  [x]RA  []NC l/min    Mask: []NRB  [] Venti    O2%                  []Ventilator  []Intubated  [] Trach  [] BiPaP   CARDIAC: [x]Regular      [] Irregular   [] Pericardial Rub  [] JVD          []  Monitored  [] Bedside  [] Remotely monitored     EDEMA: [x] None  []Generalized  [] Pitting [] 1    [] 2    [] 3    [] 4                 [] Facial  [] Pedal  []  UE  [] LE   SKIN:   [x] Warm  [] Hot     [] Cold   [x] Dry     [] Pale   [] Diaphoretic                  [] Flushed  [] Jaundiced  [] Cyanotic  [] Rash  [] Weeping   LOC:    [x] Alert      [x]Oriented:    [x] Person     [x] Place  [x]Time               [] Confused  [] Lethargic  [] Medicated  [] Non-responsive  [] Non-Verbal   GI / ABDOMEN:                     [] Flat    [] Distended    [x] Soft    [] Firm   []  Obese                   [] Diarrhea  [x] Bowel Sounds  [] Nausea  [] Vomiting     / URINE ASSESSMENT:                   [] Voiding   [x] Oliguria  [] Anuria   []  Cline                  [] Incontinent  []  Incontinent Brief []  Fecal Management System     PAIN:  [x] 0 []1  []2   []3   []4   []5   []6   []7   []8   []9   []10            Scale 0-10  Action/Follow Up:    MOBILITY:  [x] Bed    [] Stretcher      All Vitals and Treatment Details on Attached [address]: [facility]          Room # SHANON/SHANON   [] 1st Time Acute      [] Stat       [x] Routine      [] Urgent     [x] Acute Room  []  Bedside  [] ICU/CCU  [] ER   Isolation Precautions:  [x] Dialysis    There are currently no Active Isolations       ALLERGIES:     Allergies   Allergen Reactions    Ciprofloxacin Hives    Cyclopentolate Unknown (comments)    Iron Sucrose Diarrhea    Midodrine Unknown (comments)     Denies 910/1/21    Statins-Hmg-Coa Reductase Inhibitors Other (comments)     Body ache      Code Status:  Prior     Hepatitis Status     No results found for: HAMAT, HAAB, HABT, HAAT, HBSAG, HBSB, HBSAT, HBABN, HBCM, HBCAB, HBCAT, XBCABS, HBEAB, HBEAG, XHEPCS, 825186, HBEGLT, HBCMLT, HBCLT, HBEBLT, [id number], [id number], HAVMLT, [id number], HBCMLT, [id number], HCGAT     Current Labs:      Lab Results   Component Value Date/Time    WBC 8.5 11/10/2021 01:47 PM    Hemoglobin, POC 8.8 (L) 09/24/2020 08:45 AM    HGB 9.3 (L) 11/10/2021 01:47 PM    Hematocrit, POC 26 (L) 09/24/2020 08:45 AM    HCT 29.5 (L) 11/10/2021 01:47 PM    PLATELET 532 25/24/5970 01:47 PM    MCV 96.4 11/10/2021 01:47 PM     Lab Results   Component Value Date/Time    Sodium 138 11/10/2021 01:47 PM    Potassium 4.7 11/10/2021 01:47 PM    Chloride 101 11/10/2021 01:47 PM    CO2 23 11/10/2021 01:47 PM    Anion gap 14 11/10/2021 01:47 PM    Glucose 116 (H) 11/10/2021 01:47 PM    BUN 89 (H) 11/10/2021 01:47 PM    Creatinine 10.30 (H) 11/10/2021 01:47 PM    BUN/Creatinine ratio 9 (L) 11/10/2021 01:47 PM    GFR est AA 4 (L) 11/10/2021 01:47 PM    GFR est non-AA 4 (L) 11/10/2021 01:47 PM    Calcium 7.2 (L) 11/10/2021 01:47 PM          DIET:  None      PRIMARY NURSE REPORT:   Pre Dialysis: A. [employer], RN     Time: 9380        EDUCATION:    [x] Patient [] Other           Knowledge Basis: []None [x]Minimal [] Substantial []Not able to assess at this time  Barriers to learning  [x]N/A  []Intubated/Ventilated  []Sedated   [] Access Care     [] S&S of infection  [] Fluid Management  [] K+   [x] Procedural    []Albumin   [] Medications   [] Tx Options   [] Transplant   [] Diet   [] Other   Teaching Tools:  [x] Explain  [] Demo  [] Handouts [] Video  Patient response: [x] Verbalized understanding  [] Teach back  [] Return demonstration   [] Requires follow up      [x]Time Out/Safety Check  [x] Extracorporeal Circuit Tested for integrity       [address] - Before each treatment:     Machine Number:                   [address]                                     [x] Unit Machine # 3 with centralized RO                                                                           Alarm Test:  Pass time 1446            [x] RO/Machine Log Complete    Machine Temp    36.0             Dialysate: pH  7.4    Conductivity: Meter 13.8     HD Machine  14      TCD: 14  Dialyzer Lot # [id number]     Blood Tubing Lot # [id number]   Saline Lot # [id number]     CHLORINE TESTING-Before each treatment and every 4 hours    Total Chlorine: [x] less than 0.1 ppm  Initial Time Check: 1300       4 Hr/2nd Check Time: 1700   (if greater than 0.1 ppm from Primary then every 30 minutes from Secondary)     TREATMENT INITIATION - with Dialysis Precautions:   [x] All Connections Secured              [x] Saline Line Double Clamped   [x] Venous Parameters Set               [x] Arterial Parameters Set    [x] Prime Given 250ml NSS              [x]Air Foam Detector Engaged      Treatment Initiation Note:  6891; Pt arrived to HD without any complaints. Pt A &O X 3, follows commands, no distress noted on RA.  LUE AVF assessed no abnormalities noted, bruit and thrill strong. AVF accessed using  15G needles without any difficulty. Good flows achieved from both needles  1524;  Time out performed per policy, HD commenced, pt tolerated well. During Treatment Notes:  1530;HD in good progress;VSS. Vascular access visible with arterial and venous line connections intact. 1600;HD in good progress,VSS. Vascular access visible with arterial and venous line connections intact. 1630;HD in good progress,VSS. Vascular access visible with arterial and venous line connections intact. 1700;HD in good progress,VSS. Vascular access visible with arterial and venous line connections intact. 1730;HD in good progress,VSS. Vascular access visible with arterial and venous line connections intact. 1800;HD in good progress, VSS. Vascular access visible with arterial and venous line connections intact. 1824 Dialysis treatment completed. Medication Dose Volume Route Time [facility] Nurse, Title   None     MACK Laboy RN     Post Assessment  Dialyzer Cleared:[] Good [x] Fair  [] Poor  Blood processed:  59.0 L  UF Removed:  2000 Ml  Post /61  Pulse  86 Resp  19   Temp 97.6  [x] Oral [] Axillary Lungs: [x] Clear                [] No change from initial assessment   Post Tx Vascular Access: [] N/A  AVF/AVG: Bleeding stopped with  Arterial Pressure for 5 min   Venous Pressure for 5 min           Cardiac:  [x] Regular   [] Irregular   Rhythm:  [] Monitored   [x] Not Monitored   Edema;     [x]    None;   []   Generalised   Skin:[x] Warm  [x] Dry [] Diaphoretic               [] Flushed  [] Pale [] Cyanotic   Pain:  [x]0  []1 []2  []3 []4  []5  []6 []7 []8  []9  []10       Post Treatment Note:  1845;VSS. Arterial and venous needles removed and pressure applied until hemostasis achieved. Dry dressings applied. Bruit/Thrill present above and below dressings. Dressing dry , clean and intact        POST TREATMENT PRIMARY NURSE HANDOFF REPORT:   Post Dialysis: NICOLE Danielson RN                 Time:  1900       Abbreviations: AVG-arterial venous graft, AVF-arterial venous fistula, IJ-Internal Jugular, Subcl-Subclavian, Fem-Femoral, Tx-treatment, AP/HR-apical heart rate, DFR-dialysate flow rate, BFR-blood flow rate, AP-arterial pressure, -venous pressure, UF-ultrafiltrate, TMP-transmembrane pressure, Jasper-Venous, Art-Arterial, RO-Reverse Osmosis

## 2021-11-10 NOTE — ED NOTES
I performed a brief evaluation, including history and physical, of the patient here in triage and I have determined that pt will need further treatment and evaluation from the main side ER physician. I have placed initial orders to help in expediting patients care. November 10, 2021 at 12:38 PM - NVOA Banerjee      Last dialyzed on Friday. Missed Monday and today. Dr Tiffany Bull, nephrologist. Denies CP, SOB, dizziness.

## 2021-11-10 NOTE — ED PROVIDER NOTES
EMERGENCY DEPARTMENT HISTORY AND PHYSICAL EXAM  This was created with voice recognition software and transcription errors may be present. 1:39 PM  Date: 11/10/2021  Patient Name: Tommy Lopez    History of Presenting Illness     Chief Complaint:    History Provided By:     HPI: Tommy Lopez is a 64 y.o. female medical history of acidosis anemia AV fistula CKD end-stage renal disease diabetes who presents with needing dialysis. Patient's last dialysis was Friday she missed Monday and today. States she made arrangements with her insurance for transportation but no one showed up.   Shortness of breath but does not make urine    PCP: Angelika Lacy MD      Past History     Past Medical History:  Past Medical History:   Diagnosis Date    Acidosis     Anemia     Arteriovenous fistula (HCC)     Chronic kidney disease     on HD at [facility] on [address] on MWF. [phone]    Chronic pain     CKD (chronic kidney disease)     Diabetes ([facility] [address]     no meds now    ESRD (end stage renal disease) on dialysis ([facility] [address] 9/9/2021    HLD (hyperlipidemia)     HTN (hypertension)     Hyperparathyroidism due to renal insufficiency (HCC)     Hypothyroid     Kidney stone     Lung mass     Recurrent UTI     Ureter, stricture     Uric acid nephrolithiasis     Urinary incontinence        Past Surgical History:  Past Surgical History:   Procedure Laterality Date    HX APPENDECTOMY      HX CHOLECYSTECTOMY      HX GASTRIC BYPASS      Gastric stapling    HX KNEE ARTHROSCOPY      HX UROLOGICAL      right PCN placement    HX UROLOGICAL  07/23/2018    RIGHT URETEROSCOPY WITH HOLMIUM LASER    IR EXCHANGE NEPHRO PERC LT SI  2/21/2020    IR EXCHANGE NEPHRO PERC RT SI  4/13/2020    IR EXCHANGE NEPHRO PERC RT SI  7/17/2020    IR NEPHROSTOMY PERC RT PLC CATH  SI  10/14/2020    IR NEPHROURETERAL PERC RT PLC CATH NEW ACCESS  SI  4/30/2020    KS INTRO CATH DIALYSIS CIRCUIT DX ANGRPH FLUOR S&I Left 9/24/2020    FISTULOGRAM LEFT/poss permanent catheter placement performed by Sunshine Gautam MD at [facility] CATH LAB    VASCULAR SURGERY PROCEDURE UNLIST      lef AVF       Family History:  Family History   Problem Relation Age of Onset    Heart Surgery Sister        Social History:  Social History     Tobacco Use    Smoking status: Never Smoker    Smokeless tobacco: Never Used   Vaping Use    Vaping Use: Never used   Substance Use Topics    Alcohol use: Never    Drug use: Never       Allergies: Allergies   Allergen Reactions    Ciprofloxacin Hives    Cyclopentolate Unknown (comments)    Iron Sucrose Diarrhea    Midodrine Unknown (comments)     Denies 910/1/21    Statins-Hmg-Coa Reductase Inhibitors Other (comments)     Body ache       Review of Systems     Review of Systems   All other systems reviewed and are negative. 10 point review of systems otherwise negative unless noted in HPI. Physical Exam       Physical Exam  Constitutional:       Appearance: She is well-developed. HENT:      Head: Normocephalic and atraumatic. Eyes:      Pupils: Pupils are equal, round, and reactive to light. Cardiovascular:      Rate and Rhythm: Normal rate and regular rhythm. Heart sounds: Normal heart sounds. No murmur heard. No friction rub. Pulmonary:      Effort: Pulmonary effort is normal. No respiratory distress. Breath sounds: Normal breath sounds. No wheezing. Abdominal:      General: There is no distension. Palpations: Abdomen is soft. Tenderness: There is no abdominal tenderness. There is no guarding or rebound. Musculoskeletal:         General: Normal range of motion. Cervical back: Normal range of motion and neck supple. Skin:     General: Skin is warm and dry. Neurological:      Mental Status: She is alert and oriented to person, place, and time. Psychiatric:         Behavior: Behavior normal.         Thought Content:  Thought content normal.         Diagnostic Study Results     Vital Signs  EKG:  Labs:   Imaging:     Medical Decision Making     ED Course: Progress Notes, Reevaluation, and Consults:    I will be the provider of record for this patient. Provider Notes (Medical Decision Making):   Presents with needing dialysis last dialysis was Friday. Sounds as though her transportation never showed up. \    Patient received dialysis will discharge    Diagnosis     Clinical Impression: No diagnosis found. Disposition:        Patient's Medications   Start Taking    No medications on file   Continue Taking    ACETAMINOPHEN (TYLENOL EXTRA STRENGTH) 500 MG TABLET    Take 1 Tablet by mouth every six (6) hours as needed for Pain. ACETAMINOPHEN (TYLENOL) 325 MG TABLET    Take 650 mg by mouth two (2) times a day. ALLOPURINOL (ZYLOPRIM) 100 MG TABLET    Take 100 mg by mouth daily. APIXABAN (ELIQUIS) 5 MG TABLET    Take 1 Tablet by mouth two (2) times a day. ASCORBIC ACID, VITAMIN C, (VITAMIN C) 500 MG TABLET    Take 500 mg by mouth daily. B COMPLEX VITAMINS (VITAMINS B COMPLEX) TABLET    Take 1 Tab by mouth daily. BIOTIN 1,000 MCG CHEW    Take 1 Tab by mouth daily. CALCITRIOL (ROCALTROL) 0.25 MCG CAPSULE    Take 0.25 mcg by mouth daily. CHOLECALCIFEROL (VITAMIN D3) (2,000 UNITS /50 MCG) CAP CAPSULE    Take 2,000 Units by mouth two (2) times a day. Take two tabs a total of 4000 units    CYANOCOBALAMIN 1,000 MCG TABLET    Take 1,000 mcg by mouth daily. DULOXETINE (CYMBALTA) 20 MG CAPSULE    Take 20 mg by mouth daily. ESTRADIOL (ESTRACE) 0.01 % (0.1 MG/GRAM) VAGINAL CREAM    Apply a fingertip amount around the urethra three times a week. FLUDROCORTISONE (FLORINEF) 0.1 MG TABLET    Take 2 Tablets by mouth daily. GABAPENTIN (NEURONTIN) 100 MG CAPSULE    Take 2 Capsules by mouth Three (3) times a week. Max Daily Amount: 200 mg. Take 2 capsules by mouth 3 times a week as needed for pain post dialysis.   Indications: concern for back pain post dialysis    HYDROMORPHONE (DILAUDID) 2 MG TABLET    Take 1 mg by mouth every twelve (12) hours as needed for Pain. LACTOBACILLUS SP. 50 BILLION CPU (BIO-K PLUS) 50 BILLION CELL -375 MG CAP CAPSULE    Take 1 Cap by mouth daily. LATANOPROST (XALATAN) 0.005 % OPHTHALMIC SOLUTION    Administer 1 Drop to both eyes nightly. One drop at bedtime    LEVOTHYROXINE (SYNTHROID) 125 MCG TABLET    Take 125 mcg by mouth Daily (before breakfast). LIDOCAINE (LIDODERM) 5 %    Apply patch to the affected area for 12 hours a day and remove for 12 hours a day. LIDOCAINE 4 % PATCH    To back for pain as needed    MECLIZINE (ANTIVERT) 25 MG TABLET    Take 25 mg by mouth three (3) times daily as needed for Dizziness. METHOXY PEG-EPOETIN BETA (MIRCERA INJECTION)    30 mcg. MIDODRINE (PROAMATINE) 10 MG TABLET    Take 1 Tablet by mouth three (3) times daily (with meals) for 30 days. OMEPRAZOLE (PRILOSEC) 20 MG CAPSULE    Take 20 mg by mouth daily. ONDANSETRON (ZOFRAN ODT) 4 MG DISINTEGRATING TABLET    Take 4 mg by mouth every eight (8) hours as needed for Nausea or Vomiting. VIT B CMPLX 3-FA-VIT C-BIOTIN (NEPHRO HOWIE RX) 1- MG-MG-MCG TABLET    Take 1 Tab by mouth daily.    These Medications have changed    No medications on file   Stop Taking    No medications on file

## 2021-11-11 ENCOUNTER — HOME CARE VISIT (OUTPATIENT)
Dept: HOME HEALTH SERVICES | Facility: HOME HEALTH | Age: 76
End: 2021-11-11
Payer: MEDICARE

## 2021-11-11 ENCOUNTER — HOME CARE VISIT (OUTPATIENT)
Dept: SCHEDULING | Facility: HOME HEALTH | Age: 76
End: 2021-11-11
Payer: MEDICARE

## 2021-11-11 VITALS
OXYGEN SATURATION: 96 % | DIASTOLIC BLOOD PRESSURE: 60 MMHG | HEART RATE: 75 BPM | RESPIRATION RATE: 17 BRPM | TEMPERATURE: 98.8 F | SYSTOLIC BLOOD PRESSURE: 118 MMHG

## 2021-11-11 PROCEDURE — G0151 HHCP-SERV OF PT,EA 15 MIN: HCPCS

## 2021-11-11 NOTE — HOME HEALTH
SUBJECTIVE: \"I have been in so much pain. They had to use a stretcher and transport me to the hospital yesterday for me to get HD. I was there all day. It just hurts so much. \"  Pt reports 4/10 pain at rest today and in the last 24 hours 10/10 with any movement. \"  Pt is using Lidocaine patch on back and advised to start using heat and ice, alternating per instructions (see interventions). Pt also using Gabapentin, Dilaudid, and Tylenol for pain relief. Pt reports no further falls. CAREGIVER INVOLVEMENT/ASSISTANCE NEEDED FOR: meals, transportation, ADLs, medications, mobility    HOME HEALTH SUPPLIES BY TYPE AND QUANTITY ORDERED/DELIVERED THIS VISIT INCLUDE: none   OBJECTIVE:  See interventions. Pt today has bruising noted at L elbow that was not there on Monday after fall , but present today. Pt also with bandage over fistula sight on L UE and she states it was bleeding yesterday. ASSESSMENT OF PROGRESS TOWARD GOALS: Pt has had a significant decline since fall on 11/7/21, and at this time needs mod A/min A and the use of lift chair to get to stand, as well as pt is unable to take more than 2 steps with mod/min A and FWW due to pain. Therapist is the only one that has been able to have pt take steps at this time. Family (son and daughter in law) work during the days and per pt they have not been able to help her ambulate. PT will need to further educate family on proper guarding and assist level to help pt on days when PT not present for transfers and gait so pt can get to bathroom as well as mobilize and get to transport for HD. Pt had to use stretcher for HD yesterday due to inability to get to her own bus transportation (down 3 steps to road). Pt also having increased pain with HEP that was previously given and was indep at, therefore will need further education to pt and CG to assist to promote ROM as well as limited pt's pain.     CONTINUED NEED FOR THE FOLLOWING SKILLS: Pt needs cont Home PT to address signficant decline. PT to increase strength and ROM to LLE, transfer training with pt and family, gait training with pt and family, as well as stair training to get out of home to HD 3x/wk. PLAN FOR NEXT VISIT: PT [facility]bal orders to extend HOme PT 1w1, 2w3. Dr. Bakari Valencia returned call and gave verbal orders for 1w1, 2w3. Orders sent over for signature.     THE FOLLOWING DISCHARGE PLANNING WAS DISCUSSED WITH THE PATIENT/CAREGIVER: Discharge to self and family under MD supervision once all goals have been met or patient has reached max potential.

## 2021-11-11 NOTE — Clinical Note
youe welcome. Teodoro Candelario  ----- Message -----  From: Dhruv Siegel, OT  Sent: 11/11/2021   8:19 PM EST  To: Sania Kenny, PT  Subject: RE:                                                Thanks so much Janes Childs  ----- Message -----  From: Safia Chambers, PT  Sent: 11/11/2021   5:28 PM EST  To: Tanisha Hurtado, OT      Bibi ,   I don't think we need OT to come back in at this time point.   Thanks   Teodoro Candelario

## 2021-11-11 NOTE — Clinical Note
Thanks so much Singh  ----- Message -----  From: Mikayla Ricardo, PT  Sent: 11/11/2021   5:28 PM EST  To: Farrah Starkey, SEJAL Mtz ,   I don't think we need OT to come back in at this time point.   Thanks   William Dodge

## 2021-11-11 NOTE — Clinical Note
Brooklynn Christie ,   I don't think we need OT to come back in at this time point.   Thanks   Char Jean-Baptiste

## 2021-11-12 ENCOUNTER — APPOINTMENT (OUTPATIENT)
Dept: GENERAL RADIOLOGY | Age: 76
DRG: 308 | End: 2021-11-12
Attending: STUDENT IN AN ORGANIZED HEALTH CARE EDUCATION/TRAINING PROGRAM
Payer: MEDICARE

## 2021-11-12 ENCOUNTER — APPOINTMENT (OUTPATIENT)
Dept: CT IMAGING | Age: 76
DRG: 308 | End: 2021-11-12
Attending: EMERGENCY MEDICINE
Payer: MEDICARE

## 2021-11-12 ENCOUNTER — APPOINTMENT (OUTPATIENT)
Dept: GENERAL RADIOLOGY | Age: 76
DRG: 308 | End: 2021-11-12
Payer: MEDICARE

## 2021-11-12 ENCOUNTER — HOSPITAL ENCOUNTER (INPATIENT)
Age: 76
LOS: 7 days | Discharge: NURSING, REHAB OR LONG-TERM CARE | DRG: 308 | End: 2021-11-19
Attending: EMERGENCY MEDICINE | Admitting: FAMILY MEDICINE
Payer: MEDICARE

## 2021-11-12 DIAGNOSIS — S32.592A INFERIOR PUBIC RAMUS FRACTURE, LEFT, CLOSED, INITIAL ENCOUNTER (HCC): ICD-10-CM

## 2021-11-12 DIAGNOSIS — S32.415A CLOSED NONDISPLACED FRACTURE OF ANTERIOR WALL OF LEFT ACETABULUM, INITIAL ENCOUNTER (HCC): ICD-10-CM

## 2021-11-12 DIAGNOSIS — R00.0 TACHYCARDIA: ICD-10-CM

## 2021-11-12 DIAGNOSIS — M25.559 HIP PAIN: Primary | ICD-10-CM

## 2021-11-12 DIAGNOSIS — I48.0 PAROXYSMAL ATRIAL FIBRILLATION (HCC): ICD-10-CM

## 2021-11-12 DIAGNOSIS — S32.512A CLOSED FRACTURE OF SUPERIOR RAMUS OF LEFT PUBIS, INITIAL ENCOUNTER (HCC): ICD-10-CM

## 2021-11-12 PROBLEM — I48.91 AFIB (HCC): Status: ACTIVE | Noted: 2021-11-12

## 2021-11-12 PROBLEM — I48.91 ATRIAL FIBRILLATION (HCC): Status: ACTIVE | Noted: 2021-11-12

## 2021-11-12 PROBLEM — S72.009A HIP FRACTURE (HCC): Status: ACTIVE | Noted: 2021-11-12

## 2021-11-12 LAB
ANION GAP SERPL CALC-SCNC: 11 MMOL/L (ref 3–18)
BASOPHILS # BLD: 0.1 K/UL (ref 0–0.1)
BASOPHILS NFR BLD: 1 % (ref 0–2)
BUN SERPL-MCNC: 17 MG/DL (ref 7–18)
BUN/CREAT SERPL: 5 (ref 12–20)
CALCIUM SERPL-MCNC: 9.4 MG/DL (ref 8.5–10.1)
CHLORIDE SERPL-SCNC: 94 MMOL/L (ref 100–111)
CO2 SERPL-SCNC: 31 MMOL/L (ref 21–32)
CREAT SERPL-MCNC: 3.12 MG/DL (ref 0.6–1.3)
DIFFERENTIAL METHOD BLD: ABNORMAL
EOSINOPHIL # BLD: 0.2 K/UL (ref 0–0.4)
EOSINOPHIL NFR BLD: 2 % (ref 0–5)
ERYTHROCYTE [DISTWIDTH] IN BLOOD BY AUTOMATED COUNT: 15.1 % (ref 11.6–14.5)
GLUCOSE SERPL-MCNC: 99 MG/DL (ref 74–99)
HCT VFR BLD AUTO: 35.2 % (ref 35–45)
HGB BLD-MCNC: 11 G/DL (ref 12–16)
IMM GRANULOCYTES # BLD AUTO: 0.1 K/UL (ref 0–0.04)
IMM GRANULOCYTES NFR BLD AUTO: 1 % (ref 0–0.5)
INR PPP: 1.4 (ref 0.8–1.2)
LYMPHOCYTES # BLD: 3.1 K/UL (ref 0.9–3.6)
LYMPHOCYTES NFR BLD: 41 % (ref 21–52)
MCH RBC QN AUTO: 29.6 PG (ref 24–34)
MCHC RBC AUTO-ENTMCNC: 31.3 G/DL (ref 31–37)
MCV RBC AUTO: 94.6 FL (ref 78–100)
MONOCYTES # BLD: 0.8 K/UL (ref 0.05–1.2)
MONOCYTES NFR BLD: 10 % (ref 3–10)
NEUTS SEG # BLD: 3.5 K/UL (ref 1.8–8)
NEUTS SEG NFR BLD: 46 % (ref 40–73)
NRBC # BLD: 0 K/UL (ref 0–0.01)
NRBC BLD-RTO: 0 PER 100 WBC
PLATELET # BLD AUTO: 376 K/UL (ref 135–420)
PMV BLD AUTO: 9.8 FL (ref 9.2–11.8)
POTASSIUM SERPL-SCNC: 3.5 MMOL/L (ref 3.5–5.5)
PROTHROMBIN TIME: 17 SEC (ref 11.5–15.2)
RBC # BLD AUTO: 3.72 M/UL (ref 4.2–5.3)
SODIUM SERPL-SCNC: 136 MMOL/L (ref 136–145)
TROPONIN I SERPL-MCNC: <0.02 NG/ML (ref 0–0.04)
WBC # BLD AUTO: 7.6 K/UL (ref 4.6–13.2)

## 2021-11-12 PROCEDURE — 65270000029 HC RM PRIVATE

## 2021-11-12 PROCEDURE — 93005 ELECTROCARDIOGRAM TRACING: CPT

## 2021-11-12 PROCEDURE — 96375 TX/PRO/DX INJ NEW DRUG ADDON: CPT

## 2021-11-12 PROCEDURE — 96374 THER/PROPH/DIAG INJ IV PUSH: CPT

## 2021-11-12 PROCEDURE — 74011000250 HC RX REV CODE- 250: Performed by: STUDENT IN AN ORGANIZED HEALTH CARE EDUCATION/TRAINING PROGRAM

## 2021-11-12 PROCEDURE — 73700 CT LOWER EXTREMITY W/O DYE: CPT

## 2021-11-12 PROCEDURE — 99285 EMERGENCY DEPT VISIT HI MDM: CPT

## 2021-11-12 PROCEDURE — 71045 X-RAY EXAM CHEST 1 VIEW: CPT

## 2021-11-12 PROCEDURE — 73562 X-RAY EXAM OF KNEE 3: CPT

## 2021-11-12 PROCEDURE — 74011250636 HC RX REV CODE- 250/636: Performed by: STUDENT IN AN ORGANIZED HEALTH CARE EDUCATION/TRAINING PROGRAM

## 2021-11-12 PROCEDURE — 73552 X-RAY EXAM OF FEMUR 2/>: CPT

## 2021-11-12 PROCEDURE — 85610 PROTHROMBIN TIME: CPT

## 2021-11-12 PROCEDURE — 84484 ASSAY OF TROPONIN QUANT: CPT

## 2021-11-12 PROCEDURE — 85025 COMPLETE CBC W/AUTO DIFF WBC: CPT

## 2021-11-12 PROCEDURE — 80048 BASIC METABOLIC PNL TOTAL CA: CPT

## 2021-11-12 RX ORDER — PANTOPRAZOLE SODIUM 20 MG/1
20 TABLET, DELAYED RELEASE ORAL
Status: DISCONTINUED | OUTPATIENT
Start: 2021-11-13 | End: 2021-11-19 | Stop reason: HOSPADM

## 2021-11-12 RX ORDER — GABAPENTIN 100 MG/1
200 CAPSULE ORAL 3 TIMES WEEKLY
Status: DISCONTINUED | OUTPATIENT
Start: 2021-11-15 | End: 2021-11-19 | Stop reason: HOSPADM

## 2021-11-12 RX ORDER — ONDANSETRON 2 MG/ML
4 INJECTION INTRAMUSCULAR; INTRAVENOUS
Status: DISCONTINUED | OUTPATIENT
Start: 2021-11-12 | End: 2021-11-19 | Stop reason: HOSPADM

## 2021-11-12 RX ORDER — ONDANSETRON 2 MG/ML
4 INJECTION INTRAMUSCULAR; INTRAVENOUS
Status: COMPLETED | OUTPATIENT
Start: 2021-11-12 | End: 2021-11-12

## 2021-11-12 RX ORDER — DULOXETIN HYDROCHLORIDE 20 MG/1
20 CAPSULE, DELAYED RELEASE ORAL DAILY
Status: DISCONTINUED | OUTPATIENT
Start: 2021-11-13 | End: 2021-11-19 | Stop reason: HOSPADM

## 2021-11-12 RX ORDER — LATANOPROST 50 UG/ML
1 SOLUTION/ DROPS OPHTHALMIC
Status: DISCONTINUED | OUTPATIENT
Start: 2021-11-12 | End: 2021-11-19 | Stop reason: HOSPADM

## 2021-11-12 RX ORDER — HYDROMORPHONE HYDROCHLORIDE 5 MG/5ML
1 SOLUTION ORAL
Status: DISCONTINUED | OUTPATIENT
Start: 2021-11-12 | End: 2021-11-19 | Stop reason: HOSPADM

## 2021-11-12 RX ORDER — POLYETHYLENE GLYCOL 3350 17 G/17G
17 POWDER, FOR SOLUTION ORAL DAILY PRN
Status: DISCONTINUED | OUTPATIENT
Start: 2021-11-12 | End: 2021-11-19 | Stop reason: HOSPADM

## 2021-11-12 RX ORDER — LIDOCAINE 4 G/100G
1 PATCH TOPICAL EVERY 24 HOURS
Status: DISCONTINUED | OUTPATIENT
Start: 2021-11-12 | End: 2021-11-12 | Stop reason: SDUPTHER

## 2021-11-12 RX ORDER — ACETAMINOPHEN 650 MG/1
650 SUPPOSITORY RECTAL
Status: DISCONTINUED | OUTPATIENT
Start: 2021-11-12 | End: 2021-11-19 | Stop reason: HOSPADM

## 2021-11-12 RX ORDER — LIDOCAINE 4 G/100G
1 PATCH TOPICAL DAILY
Status: DISCONTINUED | OUTPATIENT
Start: 2021-11-13 | End: 2021-11-19 | Stop reason: HOSPADM

## 2021-11-12 RX ORDER — HYDROMORPHONE HYDROCHLORIDE 1 MG/ML
0.5 INJECTION, SOLUTION INTRAMUSCULAR; INTRAVENOUS; SUBCUTANEOUS ONCE
Status: COMPLETED | OUTPATIENT
Start: 2021-11-12 | End: 2021-11-12

## 2021-11-12 RX ORDER — ALLOPURINOL 100 MG/1
100 TABLET ORAL DAILY
Status: DISCONTINUED | OUTPATIENT
Start: 2021-11-13 | End: 2021-11-19 | Stop reason: HOSPADM

## 2021-11-12 RX ORDER — DILTIAZEM HYDROCHLORIDE 5 MG/ML
10 INJECTION INTRAVENOUS ONCE
Status: COMPLETED | OUTPATIENT
Start: 2021-11-12 | End: 2021-11-12

## 2021-11-12 RX ORDER — MULTIVITAMIN/IRON/FOLIC ACID 18MG-0.4MG
1 TABLET ORAL DAILY
Status: DISCONTINUED | OUTPATIENT
Start: 2021-11-13 | End: 2021-11-19 | Stop reason: HOSPADM

## 2021-11-12 RX ORDER — ACETAMINOPHEN 325 MG/1
650 TABLET ORAL
Status: DISCONTINUED | OUTPATIENT
Start: 2021-11-12 | End: 2021-11-19 | Stop reason: HOSPADM

## 2021-11-12 RX ORDER — HYDROMORPHONE HYDROCHLORIDE 2 MG/1
1.5 TABLET ORAL
Status: DISCONTINUED | OUTPATIENT
Start: 2021-11-12 | End: 2021-11-19 | Stop reason: HOSPADM

## 2021-11-12 RX ORDER — ONDANSETRON 4 MG/1
4 TABLET, ORALLY DISINTEGRATING ORAL
Status: DISCONTINUED | OUTPATIENT
Start: 2021-11-12 | End: 2021-11-19 | Stop reason: HOSPADM

## 2021-11-12 RX ORDER — MIDODRINE HYDROCHLORIDE 5 MG/1
10 TABLET ORAL
Status: DISCONTINUED | OUTPATIENT
Start: 2021-11-13 | End: 2021-11-19 | Stop reason: HOSPADM

## 2021-11-12 RX ORDER — BIOTIN 1000 MCG
1 TABLET,CHEWABLE ORAL DAILY
Status: DISCONTINUED | OUTPATIENT
Start: 2021-11-13 | End: 2021-11-19 | Stop reason: HOSPADM

## 2021-11-12 RX ORDER — SODIUM CHLORIDE 0.9 % (FLUSH) 0.9 %
5-40 SYRINGE (ML) INJECTION EVERY 8 HOURS
Status: DISCONTINUED | OUTPATIENT
Start: 2021-11-12 | End: 2021-11-19 | Stop reason: HOSPADM

## 2021-11-12 RX ORDER — CALCITRIOL 0.25 UG/1
0.25 CAPSULE ORAL DAILY
Status: DISCONTINUED | OUTPATIENT
Start: 2021-11-13 | End: 2021-11-13

## 2021-11-12 RX ORDER — SODIUM CHLORIDE 0.9 % (FLUSH) 0.9 %
5-40 SYRINGE (ML) INJECTION AS NEEDED
Status: DISCONTINUED | OUTPATIENT
Start: 2021-11-12 | End: 2021-11-19 | Stop reason: HOSPADM

## 2021-11-12 RX ORDER — LANOLIN ALCOHOL/MO/W.PET/CERES
1000 CREAM (GRAM) TOPICAL DAILY
Status: DISCONTINUED | OUTPATIENT
Start: 2021-11-13 | End: 2021-11-19 | Stop reason: HOSPADM

## 2021-11-12 RX ORDER — ASCORBIC ACID 250 MG
500 TABLET ORAL DAILY
Status: DISCONTINUED | OUTPATIENT
Start: 2021-11-13 | End: 2021-11-19 | Stop reason: HOSPADM

## 2021-11-12 RX ADMIN — HYDROMORPHONE HYDROCHLORIDE 0.5 MG: 1 INJECTION, SOLUTION INTRAMUSCULAR; INTRAVENOUS; SUBCUTANEOUS at 18:20

## 2021-11-12 RX ADMIN — ONDANSETRON 4 MG: 2 INJECTION INTRAMUSCULAR; INTRAVENOUS at 18:22

## 2021-11-12 RX ADMIN — DILTIAZEM HYDROCHLORIDE 10 MG: 5 INJECTION INTRAVENOUS at 15:52

## 2021-11-12 NOTE — ED TRIAGE NOTES
Pt fell and was seen on Sunday. Continued pain in back and left leg. Pt reports that she is unable to stand on that leg. Pt tachy in triage.  + nausea

## 2021-11-12 NOTE — ED PROVIDER NOTES
EMERGENCY DEPARTMENT HISTORY AND PHYSICAL EXAM      Date: 11/12/2021  Patient Name: Fozia Ro    History of Presenting Illness     Chief Complaint   Patient presents with    Back Pain       History Provided By: Patient and Patient's Son    HPI: Fozia Ro, 64 y.o. female with PMHx significant for chronic kidney disease on dialysis 3 times a week, prior history of ALen Ordonez currently on Eliquis, chronic back pain, presents  to the ED with cc of left-sided hip and leg pain. Patient had a fall about a week ago came to the emergency department for work-up, x-rays were negative for acute fracture findings the patient had persistent progressive pain. Patient was at dialysis today and was unable to bear any weight or ambulate. Due to concern of progressive pain and inability to ambulate, EMS was contacted to transport the patient to the emergency department for further evaluation. On presentation to the emergency department patient was tachycardic to the 170s. There are no other complaints, changes, or physical findings at this time. PCP: Parag Gomez MD    No current facility-administered medications on file prior to encounter. Current Outpatient Medications on File Prior to Encounter   Medication Sig Dispense Refill    acetaminophen (Tylenol Extra Strength) 500 mg tablet Take 1 Tablet by mouth every six (6) hours as needed for Pain. 30 Tablet 0    lidocaine (LIDODERM) 5 % Apply patch to the affected area for 12 hours a day and remove for 12 hours a day. 1 Each 0    fludrocortisone (FLORINEF) 0.1 mg tablet Take 2 Tablets by mouth daily. 60 Tablet 0    gabapentin (NEURONTIN) 100 mg capsule Take 2 Capsules by mouth Three (3) times a week. Max Daily Amount: 200 mg. Take 2 capsules by mouth 3 times a week as needed for pain post dialysis.   Indications: concern for back pain post dialysis 15 Capsule 0    midodrine (PROAMATINE) 10 mg tablet Take 1 Tablet by mouth three (3) times daily (with meals) for 30 days. 90 Tablet 0    apixaban (ELIQUIS) 5 mg tablet Take 1 Tablet by mouth two (2) times a day. 60 Tablet 0    lidocaine 4 % patch To back for pain as needed (Patient not taking: Reported on 10/18/2021) 10 Each 0    HYDROmorphone (DILAUDID) 2 mg tablet Take 1 mg by mouth every twelve (12) hours as needed for Pain.  meclizine (ANTIVERT) 25 mg tablet Take 25 mg by mouth three (3) times daily as needed for Dizziness.  methoxy peg-epoetin beta (MIRCERA INJECTION) 30 mcg.  DULoxetine (Cymbalta) 20 mg capsule Take 20 mg by mouth daily.  b complex vitamins (Vitamins B Complex) tablet Take 1 Tab by mouth daily.  estradioL (Estrace) 0.01 % (0.1 mg/gram) vaginal cream Apply a fingertip amount around the urethra three times a week. 30 g 3    biotin 1,000 mcg chew Take 1 Tab by mouth daily.  cyanocobalamin 1,000 mcg tablet Take 1,000 mcg by mouth daily.  lactobacillus sp. 50 billion cpu (BIO-K PLUS) 50 billion cell -375 mg cap capsule Take 1 Cap by mouth daily. 30 Cap 2    acetaminophen (TYLENOL) 325 mg tablet Take 650 mg by mouth two (2) times a day.  allopurinoL (ZYLOPRIM) 100 mg tablet Take 100 mg by mouth daily.  ascorbic acid, vitamin C, (VITAMIN C) 500 mg tablet Take 500 mg by mouth daily.  calcitRIOL (ROCALTROL) 0.25 mcg capsule Take 0.25 mcg by mouth daily.  cholecalciferol (VITAMIN D3) (2,000 UNITS /50 MCG) cap capsule Take 2,000 Units by mouth two (2) times a day. Take two tabs a total of 4000 units      latanoprost (XALATAN) 0.005 % ophthalmic solution Administer 1 Drop to both eyes nightly. One drop at bedtime      levothyroxine (SYNTHROID) 125 mcg tablet Take 125 mcg by mouth Daily (before breakfast).  omeprazole (PRILOSEC) 20 mg capsule Take 20 mg by mouth daily.  ondansetron (ZOFRAN ODT) 4 mg disintegrating tablet Take 4 mg by mouth every eight (8) hours as needed for Nausea or Vomiting.       vit B Cmplx 3-FA-Vit C-Biotin (NEPHRO HOWIE RX) 1- mg-mg-mcg tablet Take 1 Tab by mouth daily. Past History     Past Medical History:  Past Medical History:   Diagnosis Date    Acidosis     Anemia     Arteriovenous fistula (HCC)     Chronic kidney disease     on HD at [facility] on [address] on MWF. [phone]    Chronic pain     CKD (chronic kidney disease)     Diabetes ([facility] [address]     no meds now    ESRD (end stage renal disease) on dialysis ([facility] [address] 9/9/2021    HLD (hyperlipidemia)     HTN (hypertension)     Hyperparathyroidism due to renal insufficiency ([facility] [address]     Hypothyroid     Kidney stone     Lung mass     Recurrent UTI     Ureter, stricture     Uric acid nephrolithiasis     Urinary incontinence        Past Surgical History:  Past Surgical History:   Procedure Laterality Date    HX APPENDECTOMY      HX CHOLECYSTECTOMY      HX GASTRIC BYPASS      Gastric stapling    HX KNEE ARTHROSCOPY      HX UROLOGICAL      right PCN placement    HX UROLOGICAL  07/23/2018    RIGHT URETEROSCOPY WITH HOLMIUM LASER    IR EXCHANGE NEPHRO PERC LT SI  2/21/2020    IR EXCHANGE NEPHRO PERC RT SI  4/13/2020    IR EXCHANGE NEPHRO PERC RT SI  7/17/2020    IR NEPHROSTOMY PERC RT PLC CATH  SI  10/14/2020    IR NEPHROURETERAL PERC RT PLC CATH NEW ACCESS  SI  4/30/2020    UT INTRO CATH DIALYSIS CIRCUIT DX ANGRPH FLUOR S&I Left 9/24/2020    FISTULOGRAM LEFT/poss permanent catheter placement performed by Madeleine Garcia MD at [facility] CATH LAB    VASCULAR SURGERY PROCEDURE UNLIST      lef AVF       Family History:  Family History   Problem Relation Age of Onset    Heart Surgery Sister        Social History:  Social History     Tobacco Use    Smoking status: Never Smoker    Smokeless tobacco: Never Used   Vaping Use    Vaping Use: Never used   Substance Use Topics    Alcohol use: Never    Drug use: Never       Allergies:   Allergies   Allergen Reactions    Ciprofloxacin Hives    Cyclopentolate Unknown (comments)    Iron Sucrose Diarrhea    Statins-Hmg-Coa Reductase Inhibitors Other (comments)     Body ache         Review of Systems   Review of Systems   Cardiovascular: Positive for chest pain and palpitations. Musculoskeletal: Positive for arthralgias, back pain and gait problem. All other systems reviewed and are negative. Physical Exam   Physical Exam  Vitals and nursing note reviewed. Constitutional:       Appearance: Normal appearance. She is obese. HENT:      Head: Normocephalic and atraumatic. Mouth/Throat:      Mouth: Mucous membranes are moist.      Pharynx: Oropharynx is clear. Eyes:      Extraocular Movements: Extraocular movements intact. Conjunctiva/sclera: Conjunctivae normal.   Cardiovascular:      Rate and Rhythm: Tachycardia present. Rhythm irregular. Pulmonary:      Effort: Pulmonary effort is normal.      Breath sounds: Normal breath sounds. Abdominal:      Palpations: Abdomen is soft. Tenderness: There is no guarding or rebound. Musculoskeletal:         General: Tenderness present. Cervical back: Normal range of motion and neck supple. Comments: Tenderness to the left hip and left knee on palpation   Skin:     General: Skin is warm and dry. Neurological:      General: No focal deficit present. Mental Status: She is alert and oriented to person, place, and time. Mental status is at baseline. Psychiatric:         Mood and Affect: Mood normal.         Behavior: Behavior normal.         Thought Content:  Thought content normal.         Judgment: Judgment normal.         Diagnostic Study Results     Labs -     Recent Results (from the past 12 hour(s))   EKG, 12 LEAD, INITIAL    Collection Time: 11/12/21  3:12 PM   Result Value Ref Range    Ventricular Rate 175 BPM    QRS Duration 76 ms    Q-T Interval 278 ms    QTC Calculation (Bezet) 474 ms    Calculated R Axis -86 degrees    Calculated T Axis 52 degrees    Diagnosis       Critical Test Result: High HR  Supraventricular tachycardia  Left axis deviation  Nonspecific ST and T wave abnormality  Abnormal ECG  When compared with ECG of 12-OCT-2021 09:56,  Vent. rate has increased BY  80 BPM  ST now depressed in Lateral leads  Nonspecific T wave abnormality, worse in Lateral leads     CBC WITH AUTOMATED DIFF    Collection Time: 11/12/21  3:20 PM   Result Value Ref Range    WBC 7.6 4.6 - 13.2 K/uL    RBC 3.72 (L) 4.20 - 5.30 M/uL    HGB 11.0 (L) 12.0 - 16.0 g/dL    HCT 35.2 35.0 - 45.0 %    MCV 94.6 78.0 - 100.0 FL    MCH 29.6 24.0 - 34.0 PG    MCHC 31.3 31.0 - 37.0 g/dL    RDW 15.1 (H) 11.6 - 14.5 %    PLATELET 320 538 - 404 K/uL    MPV 9.8 9.2 - 11.8 FL    NRBC 0.0 0  WBC    ABSOLUTE NRBC 0.00 0.00 - 0.01 K/uL    NEUTROPHILS 46 40 - 73 %    LYMPHOCYTES 41 21 - 52 %    MONOCYTES 10 3 - 10 %    EOSINOPHILS 2 0 - 5 %    BASOPHILS 1 0 - 2 %    IMMATURE GRANULOCYTES 1 (H) 0.0 - 0.5 %    ABS. NEUTROPHILS 3.5 1.8 - 8.0 K/UL    ABS. LYMPHOCYTES 3.1 0.9 - 3.6 K/UL    ABS. MONOCYTES 0.8 0.05 - 1.2 K/UL    ABS. EOSINOPHILS 0.2 0.0 - 0.4 K/UL    ABS. BASOPHILS 0.1 0.0 - 0.1 K/UL    ABS. IMM.  GRANS. 0.1 (H) 0.00 - 0.04 K/UL    DF AUTOMATED     METABOLIC PANEL, BASIC    Collection Time: 11/12/21  3:20 PM   Result Value Ref Range    Sodium 136 136 - 145 mmol/L    Potassium 3.5 3.5 - 5.5 mmol/L    Chloride 94 (L) 100 - 111 mmol/L    CO2 31 21 - 32 mmol/L    Anion gap 11 3.0 - 18 mmol/L    Glucose 99 74 - 99 mg/dL    BUN 17 7.0 - 18 MG/DL    Creatinine 3.12 (H) 0.6 - 1.3 MG/DL    BUN/Creatinine ratio 5 (L) 12 - 20      GFR est AA 17 (L) >60 ml/min/1.73m2    GFR est non-AA 14 (L) >60 ml/min/1.73m2    Calcium 9.4 8.5 - 10.1 MG/DL   TROPONIN I    Collection Time: 11/12/21  3:20 PM   Result Value Ref Range    Troponin-I, QT <0.02 0.0 - 0.045 NG/ML   PROTHROMBIN TIME + INR    Collection Time: 11/12/21  3:20 PM   Result Value Ref Range    Prothrombin time 17.0 (H) 11.5 - 15.2 sec    INR 1.4 (H) 0.8 - 1.2     EKG, 12 LEAD, SUBSEQUENT    Collection Time: 11/12/21  4:10 PM   Result Value Ref Range    Ventricular Rate 90 BPM    Atrial Rate 36 BPM    QRS Duration 82 ms    Q-T Interval 348 ms    QTC Calculation (Bezet) 425 ms    Calculated R Axis -43 degrees    Calculated T Axis 70 degrees    Diagnosis       Atrial fibrillation with premature ventricular or aberrantly conducted   complexes  Left axis deviation  Nonspecific ST and T wave abnormality  Abnormal ECG  When compared with ECG of 12-NOV-2021 15:12,  Atrial fibrillation has replaced Sinus rhythm  Vent. rate has decreased BY  85 BPM  ST less depressed in Inferior leads  Nonspecific T wave abnormality, improved in Lateral leads         Radiologic Studies -   CT HIP LT WO CONT   Final Result   1. Comminuted fracture involving the left anterior acetabular wall with   involvement of the base of the left superior pubic ramus which are not   significantly displaced. 2.  Nondisplaced left inferior pubic ramus fracture. XR KNEE LT 3 V   Final Result      Total left knee arthroplasty without obvious acute complication or acute osseous   findings. Suspected small joint effusion. See additional details above. XR CHEST PORT   Final Result      No clearly acute findings. CT Results  (Last 48 hours)               11/12/21 1715  CT HIP LT WO CONT Final result    Impression:  1. Comminuted fracture involving the left anterior acetabular wall with   involvement of the base of the left superior pubic ramus which are not   significantly displaced. 2.  Nondisplaced left inferior pubic ramus fracture. Narrative:  EXAM: CT of the pelvis and left hip       INDICATION:  Evaluate for left hip/pelvic fracture, concern for occult fracture   on recent x-ray       TECHNIQUE: CT of  the pelvis and left hip. Axial images of the pelvis with   sagittal and coronal images of the left hip.        All CT scans at this facility are performed using dose optimization technique as appropriate to a performed exam, to include automated exposure control,   adjustment of the mA and/or kV according to patient size (including appropriate   matching for site specific examination) or use of iterative reconstruction   technique. COMPARISON: Plain film dated 10/5/2021 and CT of the abdomen and pelvis dated   6/18/2021       FINDINGS:    There is a nondisplaced fracture which is mildly comminuted involving the   anterior wall of the acetabulum as well as the base of the superior pubic ramus. There is an additional oblique fracture without evidence of significant   displacement of the left inferior pubic ramus. The pubic symphysis is   unremarkable. The right pubic ring is intact and unremarkable. The ischial   tuberosities bilaterally are intact and unremarkable. The femoral heads are   aligned with the acetabula. No evidence of dislocation of the left hip. No   fracture involving the femoral head or neck identified. The visualized portion   of the left femur is unremarkable. No fracture or dislocation of the right hip   identified. Calcifications are noted in the distal abdominal aorta and branches. No aneurysm   identified. A right ureteral stent is noted which appears to be well-positioned   within the bladder and right ureter. Visualized uterus and adnexa are grossly   unremarkable. Visualized portion of the small bowel and colon are unremarkable. Mild muscular atrophy is noted. No fluid collection or soft tissue mass   identified in the region of the pelvis or proximal thigh. CXR Results  (Last 48 hours)               11/12/21 1627  XR CHEST PORT Final result    Impression:      No clearly acute findings. Narrative:  EXAMINATION: Chest single view       INDICATION: Back pain, tachycardia       COMPARISON: 10/8/2021       FINDINGS: Single frontal view the chest obtained. Low lung volumes and   underpenetration limits evaluation.  Mediastinal silhouette normal in size. Minimal aortic arch calcifications. No confluent consolidation. Right midlung   streaky atelectasis/scar. No evidence of pneumothorax. No obvious acute osseous   findings. Medical Decision Making   I am the first provider for this patient. I reviewed the vital signs, available nursing notes, past medical history, past surgical history, family history and social history. Vital Signs-Reviewed the patient's vital signs. Patient Vitals for the past 12 hrs:   Temp Pulse Resp BP SpO2   11/12/21 1822 -- -- 18 (!) 120/49 98 %   11/12/21 1504 97.3 °F (36.3 °C) (!) 175 18 105/65 96 %       Pulse Oximetry Analysis - 96% on RA    Cardiac Monitor:   Rate: 175 bpm  Rhythm: Supraventricular Tachycardia (SVT) vs and Atrial Fibrillation       Records Reviewed: Nursing Notes and Old Medical Records    Provider Notes (Medical Decision Making):   78-year-old female presents to the emergency department with 1 week of progressive hip pain and inability to ambulate. Patient was at dialysis this morning with increasing pain, patient was unable to ambulate or stand from sitting position, EMS was contacted to transport her to the emergency department. Upon arrival to emergency department patient was tacky to the 170s, abnormal EKG. Patient given 10 mg diltiazem to which she responded well, current heart rate 90s to low 100s. Repeat EKG shows possible A. Fib. Due to concerns for hip pain ordered CT of hip, x-ray of knee. Consulted with cardiology in regards to patient's tachycardia and possible A. fib. They agreed to see the patient in the morning and agreed with suggestion for overnight observation. Will contact hospitalist team in order to admit patient overnight. Explained situation diagnosis to patient patient is agreeable with plan for admission and to see cardiology in the morning as well as further evaluation of her hip pain. CT of hip shows pelvic fracture.   Spoke with admission team they have agreed to admit to the family medicine inpatient [facility]. Admitted under attending Dr. Scott Rankin. Patient still resting in bed comfortably is currently stable. ED Course:   Initial assessment performed. The patients presenting problems have been discussed, and they are in agreement with the care plan formulated and outlined with them. I have encouraged them to ask questions as they arise throughout their visit. Disposition:  Admit to hospital    PLAN:  1. Current Discharge Medication List        2. Follow-up Information    None       Return to ED if worse     Diagnosis     Clinical Impression:   1. Hip pain    2.  Tachycardia

## 2021-11-12 NOTE — Clinical Note
Status[de-identified] INPATIENT [101]   Type of Bed: Medical [8]   Cardiac Monitoring Required?: Yes   Inpatient Hospitalization Certified Necessary for the Following Reasons: 9.  Other (further clarification in H&P documentation)   Admitting Diagnosis: Afib [facility]) [[id number]]   Admitting Physician: Rhonda Trinh [[id number]]   Attending Physician: Rhonda Trinh [[id number]]   Estimated Length of Stay: 2 Midnights   Discharge Plan[de-identified] Other (Specify)

## 2021-11-13 ENCOUNTER — APPOINTMENT (OUTPATIENT)
Dept: CT IMAGING | Age: 76
DRG: 308 | End: 2021-11-13
Attending: STUDENT IN AN ORGANIZED HEALTH CARE EDUCATION/TRAINING PROGRAM
Payer: MEDICARE

## 2021-11-13 PROBLEM — S32.512A CLOSED FRACTURE OF LEFT SUPERIOR PUBIC RAMUS (HCC): Status: ACTIVE | Noted: 2021-11-13

## 2021-11-13 PROBLEM — S32.402A LEFT ACETABULAR FRACTURE (HCC): Status: ACTIVE | Noted: 2021-11-13

## 2021-11-13 PROBLEM — S32.592A INFERIOR PUBIC RAMUS FRACTURE, LEFT, CLOSED, INITIAL ENCOUNTER (HCC): Status: ACTIVE | Noted: 2021-11-12

## 2021-11-13 LAB
ANION GAP SERPL CALC-SCNC: 9 MMOL/L (ref 3–18)
BASOPHILS # BLD: 0.1 K/UL (ref 0–0.1)
BASOPHILS NFR BLD: 1 % (ref 0–2)
BUN SERPL-MCNC: 28 MG/DL (ref 7–18)
BUN/CREAT SERPL: 7 (ref 12–20)
CALCIUM SERPL-MCNC: 8.3 MG/DL (ref 8.5–10.1)
CALCULATED R AXIS, ECG10: -86 DEGREES
CALCULATED T AXIS, ECG11: 52 DEGREES
CHLORIDE SERPL-SCNC: 101 MMOL/L (ref 100–111)
CO2 SERPL-SCNC: 28 MMOL/L (ref 21–32)
CREAT SERPL-MCNC: 4.3 MG/DL (ref 0.6–1.3)
DIAGNOSIS, 93000: NORMAL
DIFFERENTIAL METHOD BLD: ABNORMAL
EOSINOPHIL # BLD: 0.1 K/UL (ref 0–0.4)
EOSINOPHIL NFR BLD: 1 % (ref 0–5)
ERYTHROCYTE [DISTWIDTH] IN BLOOD BY AUTOMATED COUNT: 15.3 % (ref 11.6–14.5)
GLUCOSE SERPL-MCNC: 131 MG/DL (ref 74–99)
HCT VFR BLD AUTO: 32.1 % (ref 35–45)
HGB BLD-MCNC: 9.8 G/DL (ref 12–16)
IMM GRANULOCYTES # BLD AUTO: 0 K/UL (ref 0–0.04)
IMM GRANULOCYTES NFR BLD AUTO: 1 % (ref 0–0.5)
LYMPHOCYTES # BLD: 1.5 K/UL (ref 0.9–3.6)
LYMPHOCYTES NFR BLD: 24 % (ref 21–52)
MAGNESIUM SERPL-MCNC: 2.3 MG/DL (ref 1.6–2.6)
MCH RBC QN AUTO: 29.6 PG (ref 24–34)
MCHC RBC AUTO-ENTMCNC: 30.5 G/DL (ref 31–37)
MCV RBC AUTO: 97 FL (ref 78–100)
MONOCYTES # BLD: 0.6 K/UL (ref 0.05–1.2)
MONOCYTES NFR BLD: 10 % (ref 3–10)
NEUTS SEG # BLD: 3.9 K/UL (ref 1.8–8)
NEUTS SEG NFR BLD: 64 % (ref 40–73)
NRBC # BLD: 0 K/UL (ref 0–0.01)
NRBC BLD-RTO: 0 PER 100 WBC
PLATELET # BLD AUTO: 312 K/UL (ref 135–420)
PMV BLD AUTO: 9.8 FL (ref 9.2–11.8)
POTASSIUM SERPL-SCNC: 4.7 MMOL/L (ref 3.5–5.5)
Q-T INTERVAL, ECG07: 278 MS
QRS DURATION, ECG06: 76 MS
QTC CALCULATION (BEZET), ECG08: 474 MS
RBC # BLD AUTO: 3.31 M/UL (ref 4.2–5.3)
SODIUM SERPL-SCNC: 138 MMOL/L (ref 136–145)
T4 FREE SERPL-MCNC: 1.4 NG/DL (ref 0.7–1.5)
TSH SERPL DL<=0.05 MIU/L-ACNC: 11.4 UIU/ML (ref 0.36–3.74)
VENTRICULAR RATE, ECG03: 175 BPM
WBC # BLD AUTO: 6.1 K/UL (ref 4.6–13.2)

## 2021-11-13 PROCEDURE — 80048 BASIC METABOLIC PNL TOTAL CA: CPT

## 2021-11-13 PROCEDURE — 84443 ASSAY THYROID STIM HORMONE: CPT

## 2021-11-13 PROCEDURE — 74011250636 HC RX REV CODE- 250/636

## 2021-11-13 PROCEDURE — 97162 PT EVAL MOD COMPLEX 30 MIN: CPT

## 2021-11-13 PROCEDURE — 85025 COMPLETE CBC W/AUTO DIFF WBC: CPT

## 2021-11-13 PROCEDURE — 84439 ASSAY OF FREE THYROXINE: CPT

## 2021-11-13 PROCEDURE — 27220 TREAT HIP SOCKET FRACTURE: CPT | Performed by: SPECIALIST

## 2021-11-13 PROCEDURE — 93005 ELECTROCARDIOGRAM TRACING: CPT

## 2021-11-13 PROCEDURE — 74011000250 HC RX REV CODE- 250: Performed by: STUDENT IN AN ORGANIZED HEALTH CARE EDUCATION/TRAINING PROGRAM

## 2021-11-13 PROCEDURE — 97530 THERAPEUTIC ACTIVITIES: CPT

## 2021-11-13 PROCEDURE — 74011250636 HC RX REV CODE- 250/636: Performed by: STUDENT IN AN ORGANIZED HEALTH CARE EDUCATION/TRAINING PROGRAM

## 2021-11-13 PROCEDURE — 71275 CT ANGIOGRAPHY CHEST: CPT

## 2021-11-13 PROCEDURE — 2709999900 HC NON-CHARGEABLE SUPPLY

## 2021-11-13 PROCEDURE — 99221 1ST HOSP IP/OBS SF/LOW 40: CPT | Performed by: SPECIALIST

## 2021-11-13 PROCEDURE — 74011000250 HC RX REV CODE- 250

## 2021-11-13 PROCEDURE — 74011000636 HC RX REV CODE- 636: Performed by: FAMILY MEDICINE

## 2021-11-13 PROCEDURE — 74011250637 HC RX REV CODE- 250/637

## 2021-11-13 PROCEDURE — 83735 ASSAY OF MAGNESIUM: CPT

## 2021-11-13 PROCEDURE — 97535 SELF CARE MNGMENT TRAINING: CPT

## 2021-11-13 PROCEDURE — 97166 OT EVAL MOD COMPLEX 45 MIN: CPT

## 2021-11-13 PROCEDURE — 65270000029 HC RM PRIVATE

## 2021-11-13 RX ORDER — SODIUM CHLORIDE 450 MG/100ML
250 INJECTION, SOLUTION INTRAVENOUS CONTINUOUS
Status: DISCONTINUED | OUTPATIENT
Start: 2021-11-13 | End: 2021-11-13

## 2021-11-13 RX ORDER — SODIUM CHLORIDE 450 MG/100ML
500 INJECTION, SOLUTION INTRAVENOUS CONTINUOUS
Status: DISCONTINUED | OUTPATIENT
Start: 2021-11-13 | End: 2021-11-13

## 2021-11-13 RX ORDER — DILTIAZEM HYDROCHLORIDE 5 MG/ML
10 INJECTION INTRAVENOUS ONCE
Status: DISCONTINUED | OUTPATIENT
Start: 2021-11-13 | End: 2021-11-13

## 2021-11-13 RX ORDER — SODIUM CHLORIDE 450 MG/100ML
250 INJECTION, SOLUTION INTRAVENOUS ONCE
Status: ACTIVE | OUTPATIENT
Start: 2021-11-13 | End: 2021-11-13

## 2021-11-13 RX ORDER — DOXERCALCIFEROL 4 UG/2ML
4 INJECTION INTRAVENOUS
Status: DISCONTINUED | OUTPATIENT
Start: 2021-11-15 | End: 2021-11-19 | Stop reason: HOSPADM

## 2021-11-13 RX ORDER — DILTIAZEM HYDROCHLORIDE 5 MG/ML
5 INJECTION INTRAVENOUS ONCE
Status: COMPLETED | OUTPATIENT
Start: 2021-11-13 | End: 2021-11-13

## 2021-11-13 RX ORDER — DILTIAZEM HYDROCHLORIDE 5 MG/ML
5 INJECTION INTRAVENOUS ONCE
Status: DISCONTINUED | OUTPATIENT
Start: 2021-11-13 | End: 2021-11-13

## 2021-11-13 RX ORDER — MELATONIN
2000 2 TIMES DAILY
Status: DISCONTINUED | OUTPATIENT
Start: 2021-11-13 | End: 2021-11-19 | Stop reason: HOSPADM

## 2021-11-13 RX ADMIN — MIDODRINE HYDROCHLORIDE 10 MG: 5 TABLET ORAL at 08:33

## 2021-11-13 RX ADMIN — Medication 1 CAPSULE: at 08:32

## 2021-11-13 RX ADMIN — CALCITRIOL CAPSULES 0.25 MCG 0.25 MCG: 0.25 CAPSULE ORAL at 08:33

## 2021-11-13 RX ADMIN — SODIUM CHLORIDE 250 ML: 450 INJECTION, SOLUTION INTRAVENOUS at 04:00

## 2021-11-13 RX ADMIN — Medication 1 TABLET: at 08:38

## 2021-11-13 RX ADMIN — APIXABAN 5 MG: 5 TABLET, FILM COATED ORAL at 08:33

## 2021-11-13 RX ADMIN — NEPHROCAP 1 CAPSULE: 1 CAP ORAL at 08:32

## 2021-11-13 RX ADMIN — IOPAMIDOL 80 ML: 755 INJECTION, SOLUTION INTRAVENOUS at 13:52

## 2021-11-13 RX ADMIN — LATANOPROST 1 DROP: 50 SOLUTION OPHTHALMIC at 21:53

## 2021-11-13 RX ADMIN — Medication 500 MG: at 08:32

## 2021-11-13 RX ADMIN — CYANOCOBALAMIN TAB 1000 MCG 1000 MCG: 1000 TAB at 08:33

## 2021-11-13 RX ADMIN — DILTIAZEM HYDROCHLORIDE 5 MG: 5 INJECTION INTRAVENOUS at 02:15

## 2021-11-13 RX ADMIN — CHOLECALCIFEROL TAB 25 MCG (1000 UNIT) 2000 UNITS: 25 TAB at 21:50

## 2021-11-13 RX ADMIN — APIXABAN 5 MG: 5 TABLET, FILM COATED ORAL at 17:17

## 2021-11-13 RX ADMIN — LEVOTHYROXINE SODIUM 125 MCG: 25 TABLET ORAL at 08:32

## 2021-11-13 RX ADMIN — Medication 10 ML: at 21:54

## 2021-11-13 RX ADMIN — ALLOPURINOL 100 MG: 100 TABLET ORAL at 08:32

## 2021-11-13 RX ADMIN — MIDODRINE HYDROCHLORIDE 10 MG: 5 TABLET ORAL at 16:12

## 2021-11-13 RX ADMIN — ONDANSETRON 4 MG: 2 INJECTION INTRAMUSCULAR; INTRAVENOUS at 08:38

## 2021-11-13 RX ADMIN — DULOXETINE HYDROCHLORIDE 20 MG: 20 CAPSULE, DELAYED RELEASE ORAL at 08:32

## 2021-11-13 RX ADMIN — TRIMETHOBENZAMIDE HYDROCHLORIDE 200 MG: 100 INJECTION INTRAMUSCULAR at 02:35

## 2021-11-13 RX ADMIN — PANTOPRAZOLE SODIUM 20 MG: 20 TABLET, DELAYED RELEASE ORAL at 08:32

## 2021-11-13 RX ADMIN — Medication 10 ML: at 09:05

## 2021-11-13 RX ADMIN — ONDANSETRON 4 MG: 2 INJECTION INTRAMUSCULAR; INTRAVENOUS at 16:12

## 2021-11-13 NOTE — ROUTINE PROCESS
1315-patient arrived to the unit. No evidence of distress. 1616-admission assessment completed. 1923-/Bedside and Verbal shift change report given to Amna Leach (oncoming nurse) by Liz Caballero (offgoing nurse). Report included the following information SBAR, MAR and Recent Results.

## 2021-11-13 NOTE — PROGRESS NOTES
Problem: Self Care Deficits Care Plan (Adult)  Goal: *Acute Goals and Plan of Care (Insert Text)  Description: Occupational Therapy Goals  Initiated 11/13/2021 within 7 day(s). 1.  Patient will perform seated functional task with supervision/set-up at EOB for 10 min with stable VS.  2.  Patient will perform bathing with supervision/set-up. 3.  Patient will perform lower body dressing with supervision/set-up and AE prn.  4.  Patient will perform toilet transfers with supervision/set-up. 5.  Patient will perform all aspects of toileting with supervision/set-up. 6.  Patient will perform standing grooming task with SBA for 2  minutes with Fair+ balance with stable VS.  7.  Patient will utilize energy conservation techniques during functional activities with verbal cues. Prior Level of Function: Pt reports being Mod Ind for ADLs and functional mobility using walker or rollator. Pt completed course of OT and PT at home. Pt lives with son available to assist prn. Outcome: Progressing Towards Goal   OCCUPATIONAL THERAPY EVALUATION    Patient: [facility] [de-identified]64 y.o. female)  Date: 11/13/2021  Primary Diagnosis: Afib ([facility] [address] [I48.91]  Atrial fibrillation ([facility] [address] [I48.91]  Hip fracture ([facility] [address] [S72.009A]        Precautions:   Contact, WBAT  ASSESSMENT :  Pt cleared to participate in OT evaluation by MD. Upon entering room, pt received on stretcher, alert, and agreeable to OT eval/treatment. Pt seen in conjunction with PT to maximize safety of patient and staff members. Based on the objective data described below, the patient presents with decreased endurance and functional activity tolerance, generalized weakness, increased pain, increased HR and decreased BP with standing activity, decreased functional mobility and difficulty with WB through LLE, limiting her participation and independence with ADLs.  Pt is very motivated and eager to participate in therapy, maneuvered to EOB with Min Ax2 due to pain in LLE with movement. Pt required Mod A to don sock to LLE, was able to don sock to RUE with Mod Ind. Pt's BP in sitting 110/76, HR during activities 110 bpm-130 bpm. Pt requesting to std to attempt functional txfrs, was able to achieve std position with CGA with FWW. In standing pt's HR increased to upper 170s, pt returned to sitting for safety. Pt educated on relaxation and PLB techniques seated EOB, pt reports no SOB, nausea, or dizziness, and continues to converse in spite of VCs to maintain PLB. Pt without visible signs of distress, however, HR remained 170-180 bpm for ~ 4 min sitting EOB. BP assessed, found to be 64/29, pt returned to supine and was positioned with HOB down and BLE elevated. Pt's HR quickly recovered to 90s and BP to 131/56. Nursing present. Pt is motivated to return to PLOF and very eager to participate in therapy and go to Rehab. Educated pt on the importance of stabilizing her VS prior to progress with activity. Pt verbalized understanding. Pt will benefit on EC techniques training for ADLs. Patient will benefit from skilled intervention to address the above impairments.   Patient's rehabilitation potential is considered to be Fair  Factors which may influence rehabilitation potential include:   []             None noted  []             Mental ability/status  [x]             Medical condition  []             Home/family situation and support systems  []             Safety awareness  [x]             Pain tolerance/management  []             Other:      PLAN :  Recommendations and Planned Interventions:   [x]               Self Care Training                  [x]      Therapeutic Activities  [x]               Functional Mobility Training   []      Cognitive Retraining  [x]               Therapeutic Exercises           [x]      Endurance Activities  [x]               Balance Training                    [x]      Neuromuscular Re-Education  [x]               Visual/Perceptual Training     [x]      Home Safety Training  [x]               Patient Education                   [x]      Family Training/Education  []               Other (comment):    Frequency/Duration: Patient will be followed by occupational therapy 1-2 times per day/3-5 days per week to address goals. Discharge Recommendations: Inpatient Rehab when pt is medically stable. Further Equipment Recommendations for Discharge: bedside commode     SUBJECTIVE:   Patient stated I can do more. I like working with therapy.     OBJECTIVE DATA SUMMARY:     Past Medical History:   Diagnosis Date    Acidosis     Anemia     Arteriovenous fistula (HCC)     Chronic kidney disease     on HD at [facility] on [address] on MWF. [phone]    Chronic pain     CKD (chronic kidney disease)     Diabetes ([facility] [address]     no meds now    ESRD (end stage renal disease) on dialysis ([facility] [address] 9/9/2021    HLD (hyperlipidemia)     HTN (hypertension)     Hyperparathyroidism due to renal insufficiency (HCC)     Hypothyroid     Kidney stone     Lung mass     Recurrent UTI     Ureter, stricture     Uric acid nephrolithiasis     Urinary incontinence      Past Surgical History:   Procedure Laterality Date    HX APPENDECTOMY      HX CHOLECYSTECTOMY      HX GASTRIC BYPASS      Gastric stapling    HX KNEE ARTHROSCOPY      HX UROLOGICAL      right PCN placement    HX UROLOGICAL  07/23/2018    RIGHT URETEROSCOPY WITH HOLMIUM LASER    IR EXCHANGE NEPHRO PERC LT SI  2/21/2020    IR EXCHANGE NEPHRO PERC RT SI  4/13/2020    IR EXCHANGE NEPHRO PERC RT SI  7/17/2020    IR NEPHROSTOMY PERC RT PLC CATH  SI  10/14/2020    IR NEPHROURETERAL PERC RT PLC CATH NEW ACCESS  SI  4/30/2020    MO INTRO CATH DIALYSIS CIRCUIT DX ANGRPH FLUOR S&I Left 9/24/2020    FISTULOGRAM LEFT/poss permanent catheter placement performed by Alfonso Anna MD at [facility] CATH LAB    VASCULAR SURGERY PROCEDURE UNLIST      lef AVF     Barriers to Learning/Limitations: None  Compensate with: visual, verbal, tactile, kinesthetic cues/model    Home Situation:   Home Situation  Home Environment: Private residence  # Steps to Enter: 2  Rails to Enter: No  One/Two Story Residence: Two story, live on 1st floor  # of Interior Steps: 12  Interior Rails: Right  Living Alone: No  Support Systems: Child(isaura)  Patient Expects to be Discharged to[de-identified] Rehabilitation facility  Current DME Used/Available at Home: Walker, rollator, Walker, rolling  Tub or Shower Type: Tub/Shower combination  [x]  Right hand dominant   []  Left hand dominant    Cognitive/Behavioral Status:  Neurologic State: Alert  Orientation Level: Oriented X4  Cognition: Follows commands; Appropriate decision making  Safety/Judgement: Awareness of environment; Fall prevention    Skin: visible skin intact  Edema: none noted    Vision/Perceptual:       Acuity: Impaired far vision         Coordination: BUE  Coordination: Within functional limits  Fine Motor Skills-Upper: Left Intact; Right Intact    Gross Motor Skills-Upper: Left Intact; Right Intact    Balance:  Sitting: Intact  Standing: Impaired; With support  Standing - Static: Good  Standing - Dynamic : Fair    Strength: BUE  Strength: Generally decreased, functional (per pt BUE rotator cuff pathologies (R worse))   Tone & Sensation: BUE  Tone: Normal  Sensation: Impaired (LUE tingling during dialysis)   Range of Motion: BUE  AROM: Generally decreased, functional (per pt BUE rotator cuff pathologies (R worse))  PROM: Within functional limits     Functional Mobility and Transfers for ADLs:  Bed Mobility:     Supine to Sit: Minimum assistance; Moderate assistance  Sit to Supine: Maximum assistance; Assist x2 (quickly returned supine 2/2 unstable vitals)     Transfers:  Sit to Stand: Contact guard assistance  Stand to Sit: Contact guard assistance     ADL Assessment:   Feeding: Setup  Oral Facial Hygiene/Grooming: Setup  Bathing: Minimum assistance  Upper Body Dressing: Supervision  Lower Body Dressing:  Moderate assistance  Toileting: Moderate assistance   ADL Intervention:   Cognitive Retraining  Safety/Judgement: Awareness of environment; Fall prevention    Pain:  Pain level pre-treatment: not rated  Pain level post-treatment: not rated     Activity Tolerance:   Fair-  Please refer to the flowsheet for vital signs taken during this treatment. After treatment:   [] Patient left in no apparent distress sitting up in chair  [x] Patient left in no apparent distress in bed  [x] Call bell left within reach  [x] Nursing notified  [x] Caregiver present  [] Bed alarm activated    COMMUNICATION/EDUCATION:   [x] Role of Occupational Therapy in the acute care setting  [x] Home safety education was provided and the patient/caregiver indicated understanding. [x] Patient/family have participated as able in goal setting and plan of care. [x] Patient/family agree to work toward stated goals and plan of care. [] Patient understands intent and goals of therapy, but is neutral about his/her participation. [] Patient is unable to participate in goal setting and plan of care. Thank you for this referral.  Kaitlynn Shelley, OTR/L  Time Calculation: 30 mins    Eval Complexity: History: MEDIUM Complexity : Expanded review of history including physical, cognitive and psychosocial  history ; Examination: MEDIUM Complexity : 3-5 performance deficits relating to physical, cognitive , or psychosocial skils that result in activity limitations and / or participation restrictions; Decision Making:MEDIUM Complexity : Patient may present with comorbidities that affect occupational performnce.  Miniml to moderate modification of tasks or assistance (eg, physical or verbal ) with assesment(s) is necessary to enable patient to complete evaluation

## 2021-11-13 NOTE — PROGRESS NOTES
[facility]  Intern Progress Note    Patient: Adarsh Amador MRN: [id number]   SSN: [id number]  YOB: 1945   Age: 64 y.o. Sex: female      Admit Date: 11/12/2021    LOS: 1 day   Chief Complaint   Patient presents with    Back Pain       Subjective:     Please see previous progress note for overnight events. In summary, pt had nausea, soft BPs and a-fib. These were resolved w/tigan, IVF and dilt respectively. This AM at bedside visit, NAD. Pt states she is much better than when I last saw her a few hours ago. Only complaint is soreness at IM site of tigan injection - requests heating pad. Not in a-fib. Pt doing well, and we discussed room placement and that team would round to see her later this morning. Objective:     Visit Vitals  BP (!) 120/49   Pulse (!) 175   Temp 97.3 °F (36.3 °C)   Resp 18   Ht 5' 2\" (1.575 m)   Wt 93 kg (205 lb 0.4 oz)   SpO2 98%   BMI 37.50 kg/m²       VS at bedside: HR 88, RR19, /55    Physical Exam:   General appearance: alert, cooperative, no distress, appears stated age  Lungs: clear to auscultation bilaterally  Heart: regular rate and regular rhythm, S1, S2 normal, no murmur, click, rub or gallop  Abdomen: soft, non-tender. Bowel sounds normal. No masses,  no organomegaly  Pulses: 2+ and symmetric  Skin: Skin color, texture, turgor normal. No rashes or lesions  Neuro:  normal without focal findings  mental status, speech normal, alert and oriented x iii  Exremities: (-) LE edema  Psych:nl mood and affect    Intake and Output:  Current Shift: No intake/output data recorded. Last three shifts: No intake/output data recorded.     Lab/Data Review:  Recent Results (from the past 12 hour(s))   EKG, 12 LEAD, INITIAL    Collection Time: 11/13/21  2:03 AM   Result Value Ref Range    Ventricular Rate 132 BPM    QRS Duration 76 ms    Q-T Interval 330 ms    QTC Calculation (Bezet) 488 ms    Calculated R Axis -29 degrees    Calculated T Axis 50 degrees    Diagnosis       Supraventricular tachycardia with occasional premature ventricular complexes  Otherwise normal ECG  When compared with ECG of 12-NOV-2021 16:10,  Sinus rhythm has replaced Atrial fibrillation     METABOLIC PANEL, BASIC    Collection Time: 11/13/21  4:45 AM   Result Value Ref Range    Sodium 138 136 - 145 mmol/L    Potassium 4.7 3.5 - 5.5 mmol/L    Chloride 101 100 - 111 mmol/L    CO2 28 21 - 32 mmol/L    Anion gap 9 3.0 - 18 mmol/L    Glucose 131 (H) 74 - 99 mg/dL    BUN 28 (H) 7.0 - 18 MG/DL    Creatinine 4.30 (H) 0.6 - 1.3 MG/DL    BUN/Creatinine ratio 7 (L) 12 - 20      GFR est AA 12 (L) >60 ml/min/1.73m2    GFR est non-AA 10 (L) >60 ml/min/1.73m2    Calcium 8.3 (L) 8.5 - 10.1 MG/DL   MAGNESIUM    Collection Time: 11/13/21  4:45 AM   Result Value Ref Range    Magnesium 2.3 1.6 - 2.6 mg/dL   CBC WITH AUTOMATED DIFF    Collection Time: 11/13/21  4:45 AM   Result Value Ref Range    WBC 6.1 4.6 - 13.2 K/uL    RBC 3.31 (L) 4.20 - 5.30 M/uL    HGB 9.8 (L) 12.0 - 16.0 g/dL    HCT 32.1 (L) 35.0 - 45.0 %    MCV 97.0 78.0 - 100.0 FL    MCH 29.6 24.0 - 34.0 PG    MCHC 30.5 (L) 31.0 - 37.0 g/dL    RDW 15.3 (H) 11.6 - 14.5 %    PLATELET 450 464 - 783 K/uL    MPV 9.8 9.2 - 11.8 FL    NRBC 0.0 0  WBC    ABSOLUTE NRBC 0.00 0.00 - 0.01 K/uL    NEUTROPHILS 64 40 - 73 %    LYMPHOCYTES 24 21 - 52 %    MONOCYTES 10 3 - 10 %    EOSINOPHILS 1 0 - 5 %    BASOPHILS 1 0 - 2 %    IMMATURE GRANULOCYTES 1 (H) 0.0 - 0.5 %    ABS. NEUTROPHILS 3.9 1.8 - 8.0 K/UL    ABS. LYMPHOCYTES 1.5 0.9 - 3.6 K/UL    ABS. MONOCYTES 0.6 0.05 - 1.2 K/UL    ABS. EOSINOPHILS 0.1 0.0 - 0.4 K/UL    ABS. BASOPHILS 0.1 0.0 - 0.1 K/UL    ABS. IMM. GRANS. 0.0 0.00 - 0.04 K/UL    DF AUTOMATED             Assessment and Plan:     NOV 13, 2021 - new dmission.   No changes in plan from Dr. Citlaly Schaefer H&P other than heating pad for injection site     64 y.o. female with PMH  A fib,ESRD on HD MWF,  chronic hypotension, DM, chronic back pain, depression now admitted with a fib & L hip/pelvic fracture.     A fib. HR 170s at presentation. S/p 10mg dilt in ED. HR now in 90s. EKG with a fib. Cardiology aware, to see patient tomorrow. On eliquis 5mg BID.  - Patient currently with stable HR not on any meds. Can admin dilt if HR uncontrolled again. - Cards to see patient tomorrow. - Admit to tele  - Continue      L hip/leg pain. CT L Hip: Comminuted fracture involving the left anterior acetabular wall with involvement of the base of the left superior pubic ramus which are not significantly displaced. Nondisplaced left inferior pubic ramus fracture. - XRAY L Leg   - Ortho consulted - non surgical treatment. PT & pain control.   - Pain control: start with home pain regimen - hydromorphone 2 mg tablets -> on dialysis days take 1.5 tablets q12h, on NON dialysis days take 1/2 tablet q12h     Chronic hypotension. On Midodrine 10 TID.  - Continue home midodrine tomorrow - only got one dose today but currently normotensive.     ESRD on HD MWF  Full HD session today, Friday Nov 12th. BMP WNL. Cr 3.10.  - Consult nephro for HD while inpatient.     DM   10/8 A1c 4.6. Not on any medications. - monitor BG      Chronic Back Pain 2/2 DDD  Home regimen: gabapentin 100mg take 2 tablets after dialysis; Hydromorphone 2mg prn - 1.5 tabs on dialysis days, 1/2 tab on non dialysis days. - continue home pain regimen     Depression  Home med: cymbalta 20mg  - Continue home cymbalta.  May need to discuss switching med given ESRD as discharge approaches.     Global Care:  - VS per unit routine  - supplemental O2 for sats < 92%  - incentive spirometer  - PT/OT/CM          Sissy Hashimoto, MD, PGY-3   [address]   Intern Pager: [phone]   November 13, 2021, 7:50 AM
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
## 2021-11-13 NOTE — CONSULTS
[facility], [address] [place] [address], [address], [address] [address] [facility]  Fax: [phone]  Cardiology Consult Note   Admission Date & Time  11/12/2021  3:36 PM    Requesting Physician: Dr. Emmanuelle Mcdermott  Reason for Consult: Atrial Fibrillation    HPI: Name:     Vikram Cheney          Age:         64 y.o.            Gender:   female          Ethnicity:      The clinical work up including EKG showed Atrial Fibrillation ; CXR:  No clearly acute findings; Laboratory: WBC 6.1 Hgb 9.8 Hct 32 Plt 312 K 4.7 BUN 28 Cr 4.3 Troponin I < 0.02.      Current Medications:  Current Facility-Administered Medications   Medication Dose Route Frequency    0.45% sodium chloride infusion 250 mL  250 mL IntraVENous ONCE    trimethobenzamide (TIGAN) injection 200 mg  200 mg IntraMUSCular Q6H PRN    iopamidoL (ISOVUE 300) 61 % contrast injection 100 mL  100 mL IntraVENous RAD ONCE    sodium chloride (NS) flush 5-40 mL  5-40 mL IntraVENous Q8H    sodium chloride (NS) flush 5-40 mL  5-40 mL IntraVENous PRN    acetaminophen (TYLENOL) tablet 650 mg  650 mg Oral Q6H PRN    Or    acetaminophen (TYLENOL) suppository 650 mg  650 mg Rectal Q6H PRN    polyethylene glycol (MIRALAX) packet 17 g  17 g Oral DAILY PRN    ondansetron (ZOFRAN ODT) tablet 4 mg  4 mg Oral Q8H PRN    Or    ondansetron (ZOFRAN) injection 4 mg  4 mg IntraVENous Q6H PRN    allopurinoL (ZYLOPRIM) tablet 100 mg  100 mg Oral DAILY    ascorbic acid (vitamin C) (VITAMIN C) tablet 500 mg  500 mg Oral DAILY    vitamin B complex-folic acid 0.4 mg tablet  1 Tablet Oral DAILY    biotin chew 1 Tablet (Patient Supplied)  1 Tablet Oral DAILY    calcitRIOL (ROCALTROL) capsule 0.25 mcg  0.25 mcg Oral DAILY    cyanocobalamin tablet 1,000 mcg  1,000 mcg Oral DAILY    DULoxetine (CYMBALTA) capsule 20 mg  20 mg Oral DAILY    [START ON 11/15/2021] gabapentin (NEURONTIN) capsule 200 mg  200 mg Oral 3 times weekly    L. acidophilus,casei,rhamnosus (BIO-K PLUS) capsule 1 Capsule  1 Capsule Oral DAILY    latanoprost (XALATAN) 0.005 % ophthalmic solution 1 Drop  1 Drop Both Eyes QHS    levothyroxine (SYNTHROID) tablet 125 mcg  125 mcg Oral ACB    lidocaine 4 % patch 1 Patch  1 Patch TransDERmal DAILY    midodrine (PROAMATINE) tablet 10 mg  10 mg Oral TID WITH MEALS    pantoprazole (PROTONIX) tablet 20 mg  20 mg Oral ACB    B complex-vitaminC-folic acid (NEPHROCAP) cap  1 Capsule Oral DAILY    apixaban (ELIQUIS) tablet 5 mg  5 mg Oral BID    HYDROmorphone (DILAUDID) 1 mg/mL oral solution 1 mg  1 mg Oral Q12H PRN    HYDROmorphone (DILAUDID) tablet 1.5 mg  1.5 mg Oral Q12H PRN     Current Outpatient Medications   Medication Sig    acetaminophen (Tylenol Extra Strength) 500 mg tablet Take 1 Tablet by mouth every six (6) hours as needed for Pain.    lidocaine (LIDODERM) 5 % Apply patch to the affected area for 12 hours a day and remove for 12 hours a day. fludrocortisone (FLORINEF) 0.1 mg tablet Take 2 Tablets by mouth daily. gabapentin (NEURONTIN) 100 mg capsule Take 2 Capsules by mouth Three (3) times a week. Max Daily Amount: 200 mg. Take 2 capsules by mouth 3 times a week as needed for pain post dialysis. Indications: concern for back pain post dialysis    midodrine (PROAMATINE) 10 mg tablet Take 1 Tablet by mouth three (3) times daily (with meals) for 30 days. apixaban (ELIQUIS) 5 mg tablet Take 1 Tablet by mouth two (2) times a day. lidocaine 4 % patch To back for pain as needed (Patient not taking: Reported on 10/18/2021)    HYDROmorphone (DILAUDID) 2 mg tablet Take 1 mg by mouth every twelve (12) hours as needed for Pain. meclizine (ANTIVERT) 25 mg tablet Take 25 mg by mouth three (3) times daily as needed for Dizziness. methoxy peg-epoetin beta (MIRCERA INJECTION) 30 mcg. DULoxetine (Cymbalta) 20 mg capsule Take 20 mg by mouth daily. b complex vitamins (Vitamins B Complex) tablet Take 1 Tab by mouth daily.     estradioL (Estrace) 0.01 % (0.1 mg/gram) vaginal cream Apply a fingertip amount around the urethra three times a week. biotin 1,000 mcg chew Take 1 Tab by mouth daily. cyanocobalamin 1,000 mcg tablet Take 1,000 mcg by mouth daily. lactobacillus sp. 50 billion cpu (BIO-K PLUS) 50 billion cell -375 mg cap capsule Take 1 Cap by mouth daily. acetaminophen (TYLENOL) 325 mg tablet Take 650 mg by mouth two (2) times a day. allopurinoL (ZYLOPRIM) 100 mg tablet Take 100 mg by mouth daily. ascorbic acid, vitamin C, (VITAMIN C) 500 mg tablet Take 500 mg by mouth daily. calcitRIOL (ROCALTROL) 0.25 mcg capsule Take 0.25 mcg by mouth daily. cholecalciferol (VITAMIN D3) (2,000 UNITS /50 MCG) cap capsule Take 2,000 Units by mouth two (2) times a day. Take two tabs a total of 4000 units    latanoprost (XALATAN) 0.005 % ophthalmic solution Administer 1 Drop to both eyes nightly. One drop at bedtime    levothyroxine (SYNTHROID) 125 mcg tablet Take 125 mcg by mouth Daily (before breakfast). omeprazole (PRILOSEC) 20 mg capsule Take 20 mg by mouth daily. ondansetron (ZOFRAN ODT) 4 mg disintegrating tablet Take 4 mg by mouth every eight (8) hours as needed for Nausea or Vomiting. vit B Cmplx 3-FA-Vit C-Biotin (NEPHRO HOWIE RX) 1- mg-mg-mcg tablet Take 1 Tab by mouth daily. Allergies:   Allergies   Allergen Reactions    Ciprofloxacin Hives    Cyclopentolate Unknown (comments)    Iron Sucrose Diarrhea    Statins-Hmg-Coa Reductase Inhibitors Other (comments)     Body ache         Past History:  Past Medical History:   Diagnosis Date    Acidosis     Anemia     Arteriovenous fistula (HCC)     Chronic kidney disease     on HD at [facility] on [address] on MWF. [phone]    Chronic pain     CKD (chronic kidney disease)     Diabetes ([facility]     no meds now    ESRD (end stage renal disease) on dialysis ([facility] 9/9/2021    HLD (hyperlipidemia)     HTN (hypertension)     Hyperparathyroidism due to renal insufficiency ([facility]     Hypothyroid Kidney stone     Lung mass     Recurrent UTI     Ureter, stricture     Uric acid nephrolithiasis     Urinary incontinence        Past Surgical History:   Procedure Laterality Date    HX APPENDECTOMY      HX CHOLECYSTECTOMY      HX GASTRIC BYPASS      Gastric stapling    HX KNEE ARTHROSCOPY      HX UROLOGICAL      right PCN placement    HX UROLOGICAL  07/23/2018    RIGHT URETEROSCOPY WITH HOLMIUM LASER    IR EXCHANGE NEPHRO PERC LT SI  2/21/2020    IR EXCHANGE NEPHRO PERC RT SI  4/13/2020    IR EXCHANGE NEPHRO PERC RT SI  7/17/2020    IR NEPHROSTOMY PERC RT PLC CATH  SI  10/14/2020    IR NEPHROURETERAL PERC RT PLC CATH NEW ACCESS  SI  4/30/2020    LA INTRO CATH DIALYSIS CIRCUIT DX ANGRPH FLUOR S&I Left 9/24/2020    FISTULOGRAM LEFT/poss permanent catheter placement performed by Luis F Carvalho MD at [facility] CATH LAB    VASCULAR SURGERY PROCEDURE UNLIST      lef AVF       Family History   Problem Relation Age of Onset    Heart Surgery Sister        Social History     Socioeconomic History    Marital status: SINGLE   Tobacco Use    Smoking status: Never Smoker    Smokeless tobacco: Never Used   Vaping Use    Vaping Use: Never used   Substance and Sexual Activity    Alcohol use: Never    Drug use: Never             Vital Signs:    Visit Vitals  BP (!) 116/50   Pulse 91   Temp 97.8 °F (36.6 °C)   Resp 14   Ht 5' 2\" (1.575 m)   Wt 93 kg (205 lb 0.4 oz)   SpO2 95%   BMI 37.50 kg/m²         Physical Assessment:    Not seen    Laboratory Data:    Labs: Results:       Chemistry Recent Labs     11/13/21 0445 11/12/21  1520 11/10/21  1347   * 99 116*    136 138   K 4.7 3.5 4.7    94* 101   CO2 28 31 23   BUN 28* 17 89*   CREA 4.30* 3.12* 10.30*   CA 8.3* 9.4 7.2*   MG 2.3  --   --    AGAP 9 11 14   BUCR 7* 5* 9*      CBC w/Diff Recent Labs     11/13/21 0445 11/12/21  1520 11/10/21  1347   WBC 6.1 7.6 8.5   RBC 3.31* 3.72* 3.06*   HGB 9.8* 11.0* 9.3*   HCT 32.1* 35.2 29.5*    376 282   GRANS 64 46 60   LYMPH 24 41 26   EOS 1 2 3      Cardiac Enzymes No results for input(s): CPK, CKND1, PATTI in the last 72 hours. No lab exists for component: CKRMB, TROIP   Coagulation Recent Labs     11/12/21  1520   PTP 17.0*   INR 1.4*       Lipid Panel No results found for: CHOL, CHOLPOCT, CHOLX, CHLST, CHOLV, 204145, HDL, HDLP, LDL, LDLC, DLDLP, 641442, VLDLC, VLDL, TGLX, TRIGL, TRIGP, TGLPOCT, CHHD, CHHDX   BNP No results for input(s): BNPP in the last 72 hours. Liver Enzymes No results for input(s): TP, ALB, TBIL, AP in the last 72 hours. No lab exists for component: SGOT, GPT, DBIL   Digoxin    Thyroid Studies Lab Results   Component Value Date/Time    TSH 11.40 (H) 11/13/2021 04:45 AM            Impressions:     Paroxysmal atrial fibrillation with rapid ventricular response  Left comminuted acetabular fracture, superior and inferior pubic rami fractures  Hypotension  End Stage Renal Disease on HD   Anemia of Chronic Disease    Plan:   Telemetry  Apixaban 5 mg bid/ Midodrine 10 mg tid    Not seen    Thank you for calling. Eleazar Devine Back, [facility], [address] [place] [address] Office  [phone] Pager  11/13/2021, 1:24 PM

## 2021-11-13 NOTE — PROGRESS NOTES
Brief Progress Note  Carrillo Gonzalez a page from ED nurse stating patient's HR went up to 180 after being walked by PT. Patient was laid back in bed and HR's improved. Instructed nurse to obtain EKG. - Saw patient at bedside, who states she gest SOB when standing and walking. Per nurse, patient would be tachy in the 120s with slight movements including brushing her hair or moving around in bed. Nurse obtained EKG when patient became tachy which showed AFIB with RVR.  - Discussed with . Given patient's recent hip fracture, there is suspicion for PE. Will order CTA and will reach out to nephrology given patient is ESRD on HD.      Otis Farmer DO, PGY-2   [facility] Medicine   November 13, 2021, 12:57 PM

## 2021-11-13 NOTE — PROGRESS NOTES
BRIEF PROGRESS NOTE    Received call from nursing for tachycardia. Pt HR to 160-170s upon arrival.  Pt had been given cardizem 10mg earlier which converted her to nl sinus rhythm. Pt complained of palpitations but denied CP at our bedside visit. HR was irregularly, irregular. Further, her BP were soft at ~90s/60s. Previous echo of Oct 10, 2021 showed EF 55-60% w/trace regurge in mitral and tricuspid valves. Based on this, a 250cc bolus was administered which brought her pressure up only slightly to upper 90s/60s. A second 250cc bolus was administered. Diltiazem 5mg was also administered. Additionally, pt was experiencing nausea and had low volume, non-bloody emesis x1. Estimated volume to be <20cc. We obtained an EKG and found her first to be in a-fib and also to have a QTC in 480s. I called pharmacy to discuss and tigan IM was administerd. Night team got called away to a code and RR. Following these, I revisited the pt. Nausea was resolved and VS were wnl at: HR 88, /52 M72. Pt stated she was much improved from previous visit and was in good spirits. We will continue to monitor.     Benson Neighbor  [phone]

## 2021-11-13 NOTE — PROGRESS NOTES
OT orders received and chart reviewed. Patient is currently with active complete bedrest orders. Please update patients' activity level when appropriate prior to OT evaluation. Thank you.     Collette Hunt, MS, OTR/L

## 2021-11-13 NOTE — PROGRESS NOTES
Problem: Mobility Impaired (Adult and Pediatric)  Goal: *Acute Goals and Plan of Care (Insert Text)  Description: Physical Therapy Goals  Initiated 11/13/2021 and to be accomplished within 7 day(s)  1. Patient will move from supine to sit and sit to supine  in bed with independence. 2.  Patient will transfer from bed to chair and chair to bed with modified independence using the least restrictive device. 3.  Patient will perform sit to stand with modified independence. 4.  Patient will ambulate with modified independence for 50 feet with the least restrictive device. 5.  Patient will ascend/descend 2 stairs with no handrail(s) with minimal assistance/contact guard assist.    PLOF: Modified independent with use of RW vs rollator. Pt reports she has been working with home health PT and OT recently. Pt has assist from her son, can stay on the first floor of their home. Outcome: Progressing Towards Goal     PHYSICAL THERAPY EVALUATION    Patient: Roya Chang [de-identified]64 y.o. female)  Date: 11/13/2021  Primary Diagnosis: Afib ([facility] [address] [I48.91]  Atrial fibrillation ([facility] [facility] [I48.91]  Hip fracture ([facility] [address] [facility] [S72.009A]  Precautions: Fall     WBAT LLE  PLOF: Modified independent with use of RW    ASSESSMENT :  Based on the objective data described below, the patient presents with impaired functional mobility, gait dysfunction, balance deficits, and decreased activity tolerance 2/2 comminuted (L) hip fracture. Pt very eager and motivated, limited greatly by potential asymptomatic vagal episode this session. Per pt, \"sometimes at dialysis my BP is 60/30 and I can't go home. \" Pt required min a to complete supine to sit, BP /76, HR ranging 110-130s, tolerated sitting EOB x 5 minutes prior to standing. Upon standing, pt asymptomatic, reported no complaints of lightheadedness, nausea, and noted no diaphoresis. Pt's HR increased to 180s, returned sitting EOB x 4 minutes with no decrease of HR.  BP taken while sitting EOB: 64/29, quickly returned patient to supine where HR improved to 80-90s after supine rest break x 2 minutes, BP: 131/56. Anticipate questionable improvement at this time pending pt's medical stability as BP and HR need to stabilize before continued progression of physical mobility. Pt may benefit from SCDs/tedhose/compression stockings when performing mobility as well as potentially abdominal binder if continues to remain orthostatic while standing. Patient will benefit from skilled intervention to address the above impairments. Patient's rehabilitation potential is considered to be Guarded  Factors which may influence rehabilitation potential include:   []         None noted  []         Mental ability/status  [x]         Medical condition  [x]         Home/family situation and support systems  [x]         Safety awareness  [x]         Pain tolerance/management  []         Other:      PLAN :  Recommendations and Planned Interventions:   [x]           Bed Mobility Training             [x]    Neuromuscular Re-Education  [x]           Transfer Training                   []    Orthotic/Prosthetic Training  [x]           Gait Training                          []    Modalities  [x]           Therapeutic Exercises           []    Edema Management/Control  [x]           Therapeutic Activities            [x]    Family Training/Education  [x]           Patient Education  []           Other (comment):    Frequency/Duration: Patient will be followed by physical therapy 1-2 times per day/4-7 days per week to address goals. Discharge Recommendations: Rehab. If BP/HR are stabilized, pt would be a great candidate for inpatient rehab  Further Equipment Recommendations for Discharge: bedside commode     SUBJECTIVE:   Patient stated sometimes I just feel weak.     OBJECTIVE DATA SUMMARY:     Past Medical History:   Diagnosis Date    Acidosis     Anemia     Arteriovenous fistula ([address]     Chronic kidney disease     on HD at [facility] on [address] on [facility]. [phone]    Chronic pain     CKD (chronic kidney disease)     Diabetes ([facility] [address]     no meds now    ESRD (end stage renal disease) on dialysis ([facility] [address] 9/9/2021    HLD (hyperlipidemia)     HTN (hypertension)     Hyperparathyroidism due to renal insufficiency (HCC)     Hypothyroid     Kidney stone     Lung mass     Recurrent UTI     Ureter, stricture     Uric acid nephrolithiasis     Urinary incontinence      Past Surgical History:   Procedure Laterality Date    HX APPENDECTOMY      HX CHOLECYSTECTOMY      HX GASTRIC BYPASS      Gastric stapling    HX KNEE ARTHROSCOPY      HX UROLOGICAL      right PCN placement    HX UROLOGICAL  07/23/2018    RIGHT URETEROSCOPY WITH HOLMIUM LASER    IR EXCHANGE NEPHRO PERC LT SI  2/21/2020    IR EXCHANGE NEPHRO PERC RT SI  4/13/2020    IR EXCHANGE NEPHRO PERC RT SI  7/17/2020    IR NEPHROSTOMY PERC RT PLC CATH  SI  10/14/2020    IR NEPHROURETERAL PERC RT PLC CATH NEW ACCESS  SI  4/30/2020    FL INTRO CATH DIALYSIS CIRCUIT DX ANGRPH FLUOR S&I Left 9/24/2020    FISTULOGRAM LEFT/poss permanent catheter placement performed by Sunshine Gautam MD at [facility] CATH LAB    VASCULAR SURGERY PROCEDURE UNLIST      lef AVF     Barriers to Learning/Limitations: None  Compensate with: N/A  Home Situation:  Home Situation  Home Environment: Private residence  # Steps to Enter: 2  Rails to Enter: No  One/Two Story Residence: Two story, live on 1st floor  # of Interior Steps: 12  Interior Rails: Right  Living Alone: No  Support Systems: Child(isaura)  Patient Expects to be Discharged to[de-identified] Rehabilitation facility  Current DME Used/Available at Home: Walker, rollator, Walker, rolling  Tub or Shower Type: Tub/Shower combination  Critical Behavior:  Neurologic State: Alert  Orientation Level: Appropriate for age; Oriented X4  Cognition: Appropriate decision making; Appropriate for age attention/concentration; Appropriate safety awareness;  Follows commands  Psychosocial  Patient Behaviors: Calm; Cooperative  Strength:    Strength: Generally decreased, functional (LLE decreased 2/2 pain)  Range Of Motion:  AROM: Generally decreased, functional (LLE decreased 2/2 pain)  PROM: Generally decreased, functional (L hip not assessed)  Posture:  Posture (WDL): Exceptions to WDL  Posture Assessment: Rounded shoulders  Functional Mobility:  Bed Mobility:  Supine to Sit: Minimum assistance; Moderate assistance  Sit to Supine: Maximum assistance; Assist x2 (quickly returned supine 2/2 unstable vitals)  Transfers:  Sit to Stand: Contact guard assistance  Stand to Sit: Contact guard assistance  Balance:   Sitting: Intact  Standing: Impaired; With support  Standing - Static: Good  Standing - Dynamic : Fair  Ambulation/Gait Training:  Distance (ft): 1 Feet (ft) ((L) sidesteps)  Assistive Device: Walker, rolling  Ambulation - Level of Assistance: Moderate assistance (VCs to de-weight LLE)  Gait Description (WDL): Exceptions to WDL  Gait Abnormalities: Decreased step clearance; Step to gait (sidesteps (L))  Left Side Weight Bearing: As tolerated  Base of Support: Center of gravity altered  Speed/Marylou: Slow  Step Length: Right shortened; Left shortened  Pain:  Pain level pre-treatment: 0/10   Pain level post-treatment: 0/10   Pain Intervention(s) : Medication (see MAR); Rest, Ice, Repositioning  Response to intervention: Nurse notified, See doc flow  Activity Tolerance:   Poor due to pt's decreased BP and elevated HR with standing  Please refer to the flowsheet for vital signs taken during this treatment. After treatment:   []         Patient left in no apparent distress sitting up in chair  [x]         Patient left in no apparent distress in bed  [x]         Call bell left within reach  [x]         Nursing notified  []         Caregiver present  []         Bed alarm activated  []         SCDs applied    COMMUNICATION/EDUCATION:   [x]         Role of Physical Therapy in the acute care setting.   [x]         Fall prevention education was provided and the patient/caregiver indicated understanding. [x]         Patient/family have participated as able in goal setting and plan of care. [x]         Patient/family agree to work toward stated goals and plan of care. []         Patient understands intent and goals of therapy, but is neutral about his/her participation. []         Patient is unable to participate in goal setting/plan of care: ongoing with therapy staff.  []         Other:     Thank you for this referral.  Jessica Kenney PT, DPT   Time Calculation: 29 mins      Eval Complexity: History: MEDIUM  Complexity : 1-2 comorbidities / personal factors will impact the outcome/ POC Exam:LOW Complexity : 1-2 Standardized tests and measures addressing body structure, function, activity limitation and / or participation in recreation  Presentation: LOW Complexity : Stable, uncomplicated  Clinical Decision Making:Low Complexity    Overall Complexity:LOW

## 2021-11-13 NOTE — CONSULTS
Consult requested by: Ewa Apodaca MD   Vikram Cheney is a 64 y.o. female OTHER who is being seen on consult for ESRD. Chief Complaint   Patient presents with    Back Pain     Admission diagnosis: Atrial fibrillation [facility])     HPI:  64 yr old wit hx of ESRD,comes in for fall(last Sunday) and pain in leg. Found to have hip and pelvic fx. Went in to A fib , uncontrolled with physical therapy. Was given cardizem  CT LLE:  Imaging obtained:  CT LLE: Comminuted fracture involving the left anterior acetabular wall with  involvement of the base of the left superior pubic ramus which are not  significantly displaced.      2.  Nondisplaced left inferior pubic ramus fracture. Patient goes to dialysis unit at [facility] on MWF schedule. Last dialysis was on yesterday.    Had CTA chest today with negative findings    Past Medical History:   Diagnosis Date    Acidosis     Anemia     Arteriovenous fistula (HCC)     Chronic kidney disease     on HD at [facility] on [address] on MWF. [phone]    Chronic pain     CKD (chronic kidney disease)     Diabetes ([address]     no meds now    ESRD (end stage renal disease) on dialysis ([address] 9/9/2021    HLD (hyperlipidemia)     HTN (hypertension)     Hyperparathyroidism due to renal insufficiency ([address]     Hypothyroid     Kidney stone     Lung mass     Recurrent UTI     Ureter, stricture     Uric acid nephrolithiasis     Urinary incontinence       Past Surgical History:   Procedure Laterality Date    HX APPENDECTOMY      HX CHOLECYSTECTOMY      HX GASTRIC BYPASS      Gastric stapling    HX KNEE ARTHROSCOPY      HX UROLOGICAL      right PCN placement    HX UROLOGICAL  07/23/2018    RIGHT URETEROSCOPY WITH HOLMIUM LASER    IR EXCHANGE NEPHRO PERC LT SI  2/21/2020    IR EXCHANGE NEPHRO PERC RT SI  4/13/2020    IR EXCHANGE NEPHRO PERC RT SI  7/17/2020    IR NEPHROSTOMY PERC RT PLC CATH  SI  10/14/2020    IR NEPHROURETERAL PERC RT PLC CATH NEW ACCESS  SI 4/30/2020    UT INTRO CATH DIALYSIS CIRCUIT DX ANGRPH FLUOR S&I Left 9/24/2020    FISTULOGRAM LEFT/poss permanent catheter placement performed by Triny Cortez MD at [facility] CATH LAB    VASCULAR SURGERY PROCEDURE UNLIST      lef AVF       Social History     Socioeconomic History    Marital status: SINGLE     Spouse name: Not on file    Number of children: Not on file    Years of education: Not on file    Highest education level: Not on file   Occupational History    Not on file   Tobacco Use    Smoking status: Never Smoker    Smokeless tobacco: Never Used   Vaping Use    Vaping Use: Never used   Substance and Sexual Activity    Alcohol use: Never    Drug use: Never    Sexual activity: Not on file   Other Topics Concern    Not on file   Social History Narrative    Not on file     Social Determinants of Health     Financial Resource Strain:     Difficulty of Paying Living Expenses: Not on file   Food Insecurity:     Worried About Running Out of Food in the Last Year: Not on file    Cheryl of Food in the Last Year: Not on file   Transportation Needs:     Lack of Transportation (Medical): Not on file    Lack of Transportation (Non-Medical):  Not on file   Physical Activity:     Days of Exercise per Week: Not on file    Minutes of Exercise per Session: Not on file   Stress:     Feeling of Stress : Not on file   Social Connections:     Frequency of Communication with Friends and Family: Not on file    Frequency of Social Gatherings with Friends and Family: Not on file    Attends Gnosticism Services: Not on file    Active Member of Clubs or Organizations: Not on file    Attends Club or Organization Meetings: Not on file    Marital Status: Not on file   Intimate Partner Violence:     Fear of Current or Ex-Partner: Not on file    Emotionally Abused: Not on file    Physically Abused: Not on file    Sexually Abused: Not on file   Housing Stability:     Unable to Pay for Housing in the Last Year: Not on file    Number of Places Lived in the Last Year: Not on file    Unstable Housing in the Last Year: Not on file       Family History   Problem Relation Age of Onset    Heart Surgery Sister      Allergies   Allergen Reactions    Ciprofloxacin Hives    Cyclopentolate Unknown (comments)    Iron Sucrose Diarrhea    Statins-Hmg-Coa Reductase Inhibitors Other (comments)     Body ache        Home Medications:     Prior to Admission Medications   Prescriptions Last Dose Informant Patient Reported? Taking? DULoxetine (Cymbalta) 20 mg capsule Unknown at Unknown time  Yes No   Sig: Take 20 mg by mouth daily. HYDROmorphone (DILAUDID) 2 mg tablet Unknown at Unknown time  Yes No   Sig: Take 1 mg by mouth every twelve (12) hours as needed for Pain. acetaminophen (TYLENOL) 325 mg tablet Unknown at Unknown time  Yes No   Sig: Take 650 mg by mouth two (2) times a day. acetaminophen (Tylenol Extra Strength) 500 mg tablet Unknown at Unknown time  No No   Sig: Take 1 Tablet by mouth every six (6) hours as needed for Pain. allopurinoL (ZYLOPRIM) 100 mg tablet Unknown at Unknown time  Yes No   Sig: Take 100 mg by mouth daily. apixaban (ELIQUIS) 5 mg tablet Unknown at Unknown time  No No   Sig: Take 1 Tablet by mouth two (2) times a day. ascorbic acid, vitamin C, (VITAMIN C) 500 mg tablet Unknown at Unknown time  Yes No   Sig: Take 500 mg by mouth daily. b complex vitamins (Vitamins B Complex) tablet Unknown at Unknown time  Yes No   Sig: Take 1 Tab by mouth daily. biotin 1,000 mcg chew Unknown at Unknown time  Yes No   Sig: Take 1 Tab by mouth daily. calcitRIOL (ROCALTROL) 0.25 mcg capsule Unknown at Unknown time  Yes No   Sig: Take 0.25 mcg by mouth daily. cholecalciferol (VITAMIN D3) (2,000 UNITS /50 MCG) cap capsule Unknown at Unknown time  Yes No   Sig: Take 2,000 Units by mouth two (2) times a day.  Take two tabs a total of 4000 units   cyanocobalamin 1,000 mcg tablet Unknown at Unknown time  Yes No   Sig: Take 1,000 mcg by mouth daily. estradioL (Estrace) 0.01 % (0.1 mg/gram) vaginal cream Unknown at Unknown time  No No   Sig: Apply a fingertip amount around the urethra three times a week. fludrocortisone (FLORINEF) 0.1 mg tablet Unknown at Unknown time  No No   Sig: Take 2 Tablets by mouth daily. gabapentin (NEURONTIN) 100 mg capsule Unknown at Unknown time  No No   Sig: Take 2 Capsules by mouth Three (3) times a week. Max Daily Amount: 200 mg. Take 2 capsules by mouth 3 times a week as needed for pain post dialysis. Indications: concern for back pain post dialysis   lactobacillus sp. 50 billion cpu (BIO-K PLUS) 50 billion cell -375 mg cap capsule Unknown at Unknown time  No No   Sig: Take 1 Cap by mouth daily. latanoprost (XALATAN) 0.005 % ophthalmic solution Unknown at Unknown time  Yes No   Sig: Administer 1 Drop to both eyes nightly. One drop at bedtime   levothyroxine (SYNTHROID) 125 mcg tablet Unknown at Unknown time  Yes No   Sig: Take 125 mcg by mouth Daily (before breakfast). lidocaine (LIDODERM) 5 % Unknown at Unknown time  No No   Sig: Apply patch to the affected area for 12 hours a day and remove for 12 hours a day. lidocaine 4 % patch Unknown at Unknown time  No No   Sig: To back for pain as needed   Patient not taking: Reported on 10/18/2021   meclizine (ANTIVERT) 25 mg tablet Unknown at Unknown time  Yes No   Sig: Take 25 mg by mouth three (3) times daily as needed for Dizziness. methoxy peg-epoetin beta (MIRCERA INJECTION) Unknown at Unknown time  Yes No   Si mcg.   midodrine (PROAMATINE) 10 mg tablet Unknown at Unknown time  No No   Sig: Take 1 Tablet by mouth three (3) times daily (with meals) for 30 days. omeprazole (PRILOSEC) 20 mg capsule Unknown at Unknown time  Yes No   Sig: Take 20 mg by mouth daily.    ondansetron (ZOFRAN ODT) 4 mg disintegrating tablet Unknown at Unknown time  Yes No   Sig: Take 4 mg by mouth every eight (8) hours as needed for Nausea or Vomiting. vit B Cmplx 3-FA-Vit C-Biotin (NEPHRO HOWIE RX) 1- mg-mg-mcg tablet Unknown at Unknown time  Yes No   Sig: Take 1 Tab by mouth daily.       Facility-Administered Medications: None       Current Facility-Administered Medications   Medication Dose Route Frequency    trimethobenzamide (TIGAN) injection 200 mg  200 mg IntraMUSCular Q6H PRN    sodium chloride (NS) flush 5-40 mL  5-40 mL IntraVENous Q8H    sodium chloride (NS) flush 5-40 mL  5-40 mL IntraVENous PRN    acetaminophen (TYLENOL) tablet 650 mg  650 mg Oral Q6H PRN    Or    acetaminophen (TYLENOL) suppository 650 mg  650 mg Rectal Q6H PRN    polyethylene glycol (MIRALAX) packet 17 g  17 g Oral DAILY PRN    ondansetron (ZOFRAN ODT) tablet 4 mg  4 mg Oral Q8H PRN    Or    ondansetron (ZOFRAN) injection 4 mg  4 mg IntraVENous Q6H PRN    allopurinoL (ZYLOPRIM) tablet 100 mg  100 mg Oral DAILY    ascorbic acid (vitamin C) (VITAMIN C) tablet 500 mg  500 mg Oral DAILY    vitamin B complex-folic acid 0.4 mg tablet  1 Tablet Oral DAILY    biotin chew 1 Tablet (Patient Supplied)  1 Tablet Oral DAILY    calcitRIOL (ROCALTROL) capsule 0.25 mcg  0.25 mcg Oral DAILY    cyanocobalamin tablet 1,000 mcg  1,000 mcg Oral DAILY    DULoxetine (CYMBALTA) capsule 20 mg  20 mg Oral DAILY    [START ON 11/15/2021] gabapentin (NEURONTIN) capsule 200 mg  200 mg Oral 3 times weekly    L. acidophilus,casei,rhamnosus (BIO-K PLUS) capsule 1 Capsule  1 Capsule Oral DAILY    latanoprost (XALATAN) 0.005 % ophthalmic solution 1 Drop  1 Drop Both Eyes QHS    levothyroxine (SYNTHROID) tablet 125 mcg  125 mcg Oral ACB    lidocaine 4 % patch 1 Patch  1 Patch TransDERmal DAILY    midodrine (PROAMATINE) tablet 10 mg  10 mg Oral TID WITH MEALS    pantoprazole (PROTONIX) tablet 20 mg  20 mg Oral ACB    B complex-vitaminC-folic acid (NEPHROCAP) cap  1 Capsule Oral DAILY    apixaban (ELIQUIS) tablet 5 mg  5 mg Oral BID    HYDROmorphone (DILAUDID) 1 mg/mL oral solution 1 mg  1 mg Oral Q12H PRN    HYDROmorphone (DILAUDID) tablet 1.5 mg  1.5 mg Oral Q12H PRN       Review of Systems:     Complete 10-point review of systems were obtained and discussed in length  with the patient. Complete review of systems was negative/unremarkable  except as mentioned in HPI section. Data Review:    Labs: Results:       Chemistry Recent Labs     11/13/21  0445 11/12/21  1520   * 99    136   K 4.7 3.5    94*   CO2 28 31   BUN 28* 17   CREA 4.30* 3.12*   CA 8.3* 9.4   AGAP 9 11   BUCR 7* 5*      CBC w/Diff Recent Labs     11/13/21  0445 11/12/21  1520   WBC 6.1 7.6   RBC 3.31* 3.72*   HGB 9.8* 11.0*   HCT 32.1* 35.2    376   GRANS 64 46   LYMPH 24 41   EOS 1 2      Coagulation Recent Labs     11/12/21  1520   PTP 17.0*   INR 1.4*       Iron/Ferritin No results for input(s): IRON in the last 72 hours. No lab exists for component: TIBCCALC   BNP No results for input(s): BNPP in the last 72 hours. Cardiac Enzymes No results for input(s): CPK, CKND1, PATTI in the last 72 hours. No lab exists for component: CKRMB, TROIP   Liver Enzymes No results for input(s): TP, ALB, TBIL, AP in the last 72 hours.     No lab exists for component: SGOT, GPT, DBIL   Thyroid Studies Lab Results   Component Value Date/Time    TSH 11.40 (H) 11/13/2021 04:45 AM         EKG: A fib  Physical Assessment:     Visit Vitals  BP (!) 116/50   Pulse 91   Temp 97.8 °F (36.6 °C)   Resp 14   Ht 5' 2\" (1.575 m)   Wt 93 kg (205 lb 0.4 oz)   SpO2 95%   BMI 37.50 kg/m²     Weight change:   No intake or output data in the 24 hours ending 11/13/21 1510  Physical Exam  General: comfortable, no acute distress   HEENT sclera anicteric, supple neck, no thyromegaly  CVS: S1S2 heard,  no rub  RS: + air entry b/l,   Abd: Soft, Non tender, Not distended, Positive bowel sounds, no organomegaly, no CVA / supra pubic tenderness  Neuro: non focal, awake, alert , CN II-XII are grossly intact  Extrm:no  edema, no cyanosis, clubbing   Skin: no visible  Rash  Access; Left upper arm av fistula  Procedures/imaging: see electronic medical records for all procedures, Xrays and details which were not copied into this note but were reviewed prior to creation of Plan      Impression & Plan:   IMPRESSION:    esrd mwf dialysis   Pelvic fx,non displaced   A fib   Chronic hypotension,on midodrine   Secondary hyperparathyroidism   Anemia of chronic disease     PLAN:   Get phos levels  Get iron , ferritin levels  Get PTH  Start hectorol  D/c calcitriol  Start retacrit  Plan for next HD on monday  Avoid Gadolinium due to its association with nephrogenic systemic fibrosis in a patients with severe ARF and ESRD. Please dose all medications for creatinine clearance <15/dialysis. Avoid blood pressure checks, blood draws, peripheral iv's on arm with access. AvoidPICC lines on either arm in order to preserve veins for dialysis access creation.              Sandra Christianson MD  November 13, 2021  [facility]  Office [phone]

## 2021-11-13 NOTE — ED NOTES
PT at bedside, when standing pt, pts HR noted to be 170s and BP dropped to 60s  Pt assisted back to bed, laying flat  HR now 90-100s and BP 130s  Pt not symptomatic at this time

## 2021-11-13 NOTE — PROGRESS NOTES
conducted an initial consultation and Spiritual Assessment for Stevie Hubbard, who is a 64 y.o.,female. Patient's Primary Language is: [place]. According to the patient's EMR Christianity Affiliation is: Gnosticist. The reason the Patient came to the hospital is:   Patient Active Problem List    Diagnosis Date Noted    Left acetabular fracture ([address] 11/13/2021    Closed fracture of left superior pubic ramus ([address] 11/13/2021    Afib ([address] 11/12/2021    Atrial fibrillation ([address] 11/12/2021    Inferior pubic ramus fracture, left, closed, initial encounter ([address] 11/12/2021    Septic shock ([address] 10/08/2021    Hydronephrosis 10/05/2021    ESRD (end stage renal disease) on dialysis ([address] 09/09/2021    Sepsis ([address] 06/18/2021    Tachycardia 06/18/2021    Disease of the lower urinary tract 03/02/2021    Nausea & vomiting 28/98/8303    Complicated UTI (urinary tract infection) 07/15/2020    Type 2 diabetes mellitus, without long-term current use of insulin ([address] 05/17/2020    CKD (chronic kidney disease) stage 5, GFR less than 15 ml/min ([address] 05/17/2020    Acquired hypothyroidism 05/17/2020    GERD (gastroesophageal reflux disease) 05/17/2020    Anemia 05/15/2020    Hypotension 05/15/2020    UTI (urinary tract infection) 05/15/2020    Anemia of chronic renal failure 03/19/2020    Pyelonephritis 60/40/4863    Complicated urinary tract infection 02/20/2020        The  provided the following Interventions:  Initiated a relationship of care and support. Explored issues of valentina, belief, spirituality and Caodaism/ritual needs while hospitalized. Listened empathically as she shared about recent falls and expected path towards strength recovery, concerns surrounding the upcoming sale of her home in [place] in [place] and her family support system (whom she also lives with), her son and daughter-in-law. Left her as he daughter in law arrived for a visit. Chart reviewed.     The following outcomes where achieved:  Patient shared limited information about both their medical narrative and spiritual journey/beliefs. Patient processed feeling about current hospitalization. Patient expressed gratitude for 's visit. Assessment:  Patient does not have any Taoism/cultural needs that will affect patient's preferences in health care. There are no spiritual or Taoism issues which require intervention at this time. Plan:  Chaplains will continue to follow and will provide pastoral care on an as needed/requested basis.  recommends bedside caregivers page  on duty if patient shows signs of acute spiritual or emotional distress.     [address] [place]   [phone]

## 2021-11-13 NOTE — H&P
Admission History and Physical  [facility], [place]          Patient: Ning Hurtado MRN: [id number]  CSN: [id number]    YOB: 1945  Age: 64 y.o. Sex: female      Admission Date: 11/12/2021       HPI:     Ning Hurtado is a 64 y.o. female with PMH A fib, ESRD on HD MWF,  chronic hypotension, DM, chronic back pain, depression now admitted with A fib & L hip/pelvic fracture. Patient reports on Sunday she was having palpitations and had a fall. She went to ED for L hip & leg pain workup which was negative for acute fracture. Since then she has had persistent left lateral lower extremity pain that is restricting ambulation. Today she was at dialysis and was unable to bear weight or ambulate, so EMS was contacted and she was transported to ED. On arrival patient was tachycardic to 170s and with a fib on EKG. She was given 10mg diltiazem. HR responded well, down to 90s/low 100s. Repeat EKG showed possible A fib. ED Course:  VS: afebrile, , 106/65, RR18, Sats 96-98%    Labs obtained:  - CBC: WNL  - BMP: Cr 3.12 (previous 10)  - Trop negative  - EKG: a fib    Medications administered: dilt 10mg, 1x dilaudid 0.5mg, 1x zofran 4mg    Imaging obtained:  CT LLE: Comminuted fracture involving the left anterior acetabular wall with  involvement of the base of the left superior pubic ramus which are not  significantly displaced.      2. Nondisplaced left inferior pubic ramus fracture. ROS - Denies fevers, chills, chest pain, sob, abdominal pina, n/v, blood in stool, dysuria, hematuria, CORI.      Past Medical History:   Diagnosis Date    Acidosis     Anemia     Arteriovenous fistula (HCC)     Chronic kidney disease     on HD at [facility] on [address] on MWF. [phone]    Chronic pain     CKD (chronic kidney disease)     [facility]     no meds now    ESRD (end stage renal disease) on dialysis ([facility] 9/9/2021    HLD (hyperlipidemia)     HTN (hypertension)     Hyperparathyroidism due to renal insufficiency (HCC)     Hypothyroid     Kidney stone     Lung mass     Recurrent UTI     Ureter, stricture     Uric acid nephrolithiasis     Urinary incontinence        Past Surgical History:   Procedure Laterality Date    HX APPENDECTOMY      HX CHOLECYSTECTOMY      HX GASTRIC BYPASS      Gastric stapling    HX KNEE ARTHROSCOPY      HX UROLOGICAL      right PCN placement    HX UROLOGICAL  07/23/2018    RIGHT URETEROSCOPY WITH HOLMIUM LASER    IR EXCHANGE NEPHRO PERC LT SI  2/21/2020    IR EXCHANGE NEPHRO PERC RT SI  4/13/2020    IR EXCHANGE NEPHRO PERC RT SI  7/17/2020    IR NEPHROSTOMY PERC RT PLC CATH  SI  10/14/2020    IR NEPHROURETERAL PERC RT PLC CATH NEW ACCESS  SI  4/30/2020    ID INTRO CATH DIALYSIS CIRCUIT DX ANGRPH FLUOR S&I Left 9/24/2020    FISTULOGRAM LEFT/poss permanent catheter placement performed by Theodora Adan MD at [facility] CATH LAB    VASCULAR SURGERY PROCEDURE UNLIST      lef AVF       Family History   Problem Relation Age of Onset    Heart Surgery Sister        Social History     Socioeconomic History    Marital status: SINGLE   Tobacco Use    Smoking status: Never Smoker    Smokeless tobacco: Never Used   Vaping Use    Vaping Use: Never used   Substance and Sexual Activity    Alcohol use: Never    Drug use: Never       Allergies   Allergen Reactions    Ciprofloxacin Hives    Cyclopentolate Unknown (comments)    Iron Sucrose Diarrhea    Statins-Hmg-Coa Reductase Inhibitors Other (comments)     Body ache       Prior to Admission Medications   Prescriptions Last Dose Informant Patient Reported? Taking? DULoxetine (Cymbalta) 20 mg capsule   Yes No   Sig: Take 20 mg by mouth daily. HYDROmorphone (DILAUDID) 2 mg tablet   Yes No   Sig: Take 1 mg by mouth every twelve (12) hours as needed for Pain. acetaminophen (TYLENOL) 325 mg tablet   Yes No   Sig: Take 650 mg by mouth two (2) times a day.    acetaminophen (Tylenol Extra Strength) 500 mg tablet No No   Sig: Take 1 Tablet by mouth every six (6) hours as needed for Pain. allopurinoL (ZYLOPRIM) 100 mg tablet   Yes No   Sig: Take 100 mg by mouth daily. apixaban (ELIQUIS) 5 mg tablet   No No   Sig: Take 1 Tablet by mouth two (2) times a day. ascorbic acid, vitamin C, (VITAMIN C) 500 mg tablet   Yes No   Sig: Take 500 mg by mouth daily. b complex vitamins (Vitamins B Complex) tablet   Yes No   Sig: Take 1 Tab by mouth daily. biotin 1,000 mcg chew   Yes No   Sig: Take 1 Tab by mouth daily. calcitRIOL (ROCALTROL) 0.25 mcg capsule   Yes No   Sig: Take 0.25 mcg by mouth daily. cholecalciferol (VITAMIN D3) (2,000 UNITS /50 MCG) cap capsule   Yes No   Sig: Take 2,000 Units by mouth two (2) times a day. Take two tabs a total of 4000 units   cyanocobalamin 1,000 mcg tablet   Yes No   Sig: Take 1,000 mcg by mouth daily. estradioL (Estrace) 0.01 % (0.1 mg/gram) vaginal cream   No No   Sig: Apply a fingertip amount around the urethra three times a week. fludrocortisone (FLORINEF) 0.1 mg tablet   No No   Sig: Take 2 Tablets by mouth daily. gabapentin (NEURONTIN) 100 mg capsule   No No   Sig: Take 2 Capsules by mouth Three (3) times a week. Max Daily Amount: 200 mg. Take 2 capsules by mouth 3 times a week as needed for pain post dialysis. Indications: concern for back pain post dialysis   lactobacillus sp. 50 billion cpu (BIO-K PLUS) 50 billion cell -375 mg cap capsule   No No   Sig: Take 1 Cap by mouth daily. latanoprost (XALATAN) 0.005 % ophthalmic solution   Yes No   Sig: Administer 1 Drop to both eyes nightly. One drop at bedtime   levothyroxine (SYNTHROID) 125 mcg tablet   Yes No   Sig: Take 125 mcg by mouth Daily (before breakfast). lidocaine (LIDODERM) 5 %   No No   Sig: Apply patch to the affected area for 12 hours a day and remove for 12 hours a day.    lidocaine 4 % patch   No No   Sig: To back for pain as needed   Patient not taking: Reported on 10/18/2021   meclizine (ANTIVERT) 25 mg tablet   Yes No   Sig: Take 25 mg by mouth three (3) times daily as needed for Dizziness. methoxy peg-epoetin beta (MIRCERA INJECTION)   Yes No   Si mcg.   midodrine (PROAMATINE) 10 mg tablet   No No   Sig: Take 1 Tablet by mouth three (3) times daily (with meals) for 30 days. omeprazole (PRILOSEC) 20 mg capsule   Yes No   Sig: Take 20 mg by mouth daily. ondansetron (ZOFRAN ODT) 4 mg disintegrating tablet   Yes No   Sig: Take 4 mg by mouth every eight (8) hours as needed for Nausea or Vomiting. vit B Cmplx 3-FA-Vit C-Biotin (NEPHRO HOWIE RX) 1- mg-mg-mcg tablet   Yes No   Sig: Take 1 Tab by mouth daily. Facility-Administered Medications: None       Objective:     Patient Vitals for the past 24 hrs:   Temp Pulse Resp BP SpO2   21 1822 -- -- 18 (!) 120/49 98 %   21 1504 97.3 °F (36.3 °C) (!) 175 18 105/65 96 %       Physical Exam:   General:  AAOx3, NAD   HEENT:  EOMI. No gross abnormalities present. CV:  RRR. No visible pulsations or thrills. RESP:  Unlabored breathing. Lungs CTAB, no wheezes, rales or rhonchi appreciated. Equal expansion bilaterally. ABD:  Soft, nontender, nondistended. BS (+). No suprapubic tenderness. RECTAL:  Deferred. MS:  Decreased ROM for LLE due to pain. Pain in lateral left upper leg, no tenderness to palpation. Neuro:  CN II-XII grossly intact. 5/5 strength bilateral upper extremities and lower extremities. Intact sensation in b/l lower extremities. Ext:  No edema. 2+ radial and dp pulses bilaterally. Skin:  No rashes, lesions, or ulcers. Good turgor.   Psych: normal mood and affect     Labs & Imaging:   Recent Results (from the past 48 hour(s))   EKG, 12 LEAD, INITIAL    Collection Time: 21  3:12 PM   Result Value Ref Range    Ventricular Rate 175 BPM    QRS Duration 76 ms    Q-T Interval 278 ms    QTC Calculation (Bezet) 474 ms    Calculated R Axis -86 degrees    Calculated T Axis 52 degrees    Diagnosis       ** Critical Test Result: High HR  Supraventricular tachycardia  Left axis deviation  Nonspecific ST and T wave abnormality  Abnormal ECG  When compared with ECG of 12-OCT-2021 09:56,  Vent. rate has increased BY  80 BPM  ST now depressed in Lateral leads  Nonspecific T wave abnormality, worse in Lateral leads     CBC WITH AUTOMATED DIFF    Collection Time: 11/12/21  3:20 PM   Result Value Ref Range    WBC 7.6 4.6 - 13.2 K/uL    RBC 3.72 (L) 4.20 - 5.30 M/uL    HGB 11.0 (L) 12.0 - 16.0 g/dL    HCT 35.2 35.0 - 45.0 %    MCV 94.6 78.0 - 100.0 FL    MCH 29.6 24.0 - 34.0 PG    MCHC 31.3 31.0 - 37.0 g/dL    RDW 15.1 (H) 11.6 - 14.5 %    PLATELET 738 503 - 894 K/uL    MPV 9.8 9.2 - 11.8 FL    NRBC 0.0 0  WBC    ABSOLUTE NRBC 0.00 0.00 - 0.01 K/uL    NEUTROPHILS 46 40 - 73 %    LYMPHOCYTES 41 21 - 52 %    MONOCYTES 10 3 - 10 %    EOSINOPHILS 2 0 - 5 %    BASOPHILS 1 0 - 2 %    IMMATURE GRANULOCYTES 1 (H) 0.0 - 0.5 %    ABS. NEUTROPHILS 3.5 1.8 - 8.0 K/UL    ABS. LYMPHOCYTES 3.1 0.9 - 3.6 K/UL    ABS. MONOCYTES 0.8 0.05 - 1.2 K/UL    ABS. EOSINOPHILS 0.2 0.0 - 0.4 K/UL    ABS. BASOPHILS 0.1 0.0 - 0.1 K/UL    ABS. IMM.  GRANS. 0.1 (H) 0.00 - 0.04 K/UL    DF AUTOMATED     METABOLIC PANEL, BASIC    Collection Time: 11/12/21  3:20 PM   Result Value Ref Range    Sodium 136 136 - 145 mmol/L    Potassium 3.5 3.5 - 5.5 mmol/L    Chloride 94 (L) 100 - 111 mmol/L    CO2 31 21 - 32 mmol/L    Anion gap 11 3.0 - 18 mmol/L    Glucose 99 74 - 99 mg/dL    BUN 17 7.0 - 18 MG/DL    Creatinine 3.12 (H) 0.6 - 1.3 MG/DL    BUN/Creatinine ratio 5 (L) 12 - 20      GFR est AA 17 (L) >60 ml/min/1.73m2    GFR est non-AA 14 (L) >60 ml/min/1.73m2    Calcium 9.4 8.5 - 10.1 MG/DL   TROPONIN I    Collection Time: 11/12/21  3:20 PM   Result Value Ref Range    Troponin-I, QT <0.02 0.0 - 0.045 NG/ML   PROTHROMBIN TIME + INR    Collection Time: 11/12/21  3:20 PM   Result Value Ref Range    Prothrombin time 17.0 (H) 11.5 - 15.2 sec    INR 1.4 (H) 0.8 - 1.2     EKG, 12 LEAD, SUBSEQUENT    Collection Time: 11/12/21  4:10 PM   Result Value Ref Range    Ventricular Rate 90 BPM    Atrial Rate 36 BPM    QRS Duration 82 ms    Q-T Interval 348 ms    QTC Calculation (Bezet) 425 ms    Calculated R Axis -43 degrees    Calculated T Axis 70 degrees    Diagnosis       Atrial fibrillation with premature ventricular or aberrantly conducted   complexes  Left axis deviation  Nonspecific ST and T wave abnormality  Abnormal ECG  When compared with ECG of 12-NOV-2021 15:12,  Atrial fibrillation has replaced Sinus rhythm  Vent. rate has decreased BY  85 BPM  ST less depressed in Inferior leads  Nonspecific T wave abnormality, improved in Lateral leads         Assessment & Plan:   64 y.o. female with PMH  A fib,ESRD on HD MWF,  chronic hypotension, DM, chronic back pain, depression now admitted with a fib & L hip/pelvic fracture. A fib. HR 170s at presentation. S/p 10mg dilt in ED. HR now in 90s. EKG with a fib. Cardiology aware, to see patient tomorrow. On eliquis 5mg BID.  - Patient currently with stable HR not on any meds. Can admin dilt if HR uncontrolled again. - Cards to see patient tomorrow. - Admit to tele  - Continue     L hip/leg pain. CT L Hip: Comminuted fracture involving the left anterior acetabular wall with involvement of the base of the left superior pubic ramus which are not significantly displaced. Nondisplaced left inferior pubic ramus fracture. - XRAY L Leg   - Ortho consulted - non surgical treatment. PT & pain control.   - Pain control: start with home pain regimen - hydromorphone 2 mg tablets -> on dialysis days take 1.5 tablets q12h, on NON dialysis days take 1/2 tablet q12h    Chronic hypotension. On Midodrine 10 TID.  - Continue home midodrine tomorrow - only got one dose today but currently normotensive. ESRD on HD MWF  Full HD session today, Friday Nov 12th. BMP WNL. Cr 3.10.  - Consult nephro for HD while inpatient.     DM   10/8 A1c 4.6. Not on any medications. - monitor BG      Chronic Back Pain 2/2 DDD  Home regimen: gabapentin 100mg take 2 tablets after dialysis; Hydromorphone 2mg prn - 1.5 tabs on dialysis days, 1/2 tab on non dialysis days. - continue home pain regimen    Depression  Home med: cymbalta 20mg  - Continue home cymbalta. May need to discuss switching med given ESRD as discharge approaches.     Global Care:  - VS per unit routine  - supplemental O2 for sats < 92%  - incentive spirometer  - PT/OT/CM    Diet  Adult Diet   DVT Prophylaxis  SCDs   GI Prophylaxis  Pantoprazole   Code status  DNR   Disposition  Admit to tele     Point of Contact Son: Mayela Aguiar  [address] MD , PGY-1   [facility] Medicine   November 12, 2021, 9:03 PM

## 2021-11-13 NOTE — ED NOTES
Per MD, do not stop blood thinner at this time incase pt is in AFIB, complete a repeated EKG first  Repeat EKG completed Per MDs order  When pt is laying flat and not moving, HR is in 90s, EKG completed  Pt combed her hair while laying in bed, -130s, another EKG completed at this time

## 2021-11-14 LAB
ANION GAP SERPL CALC-SCNC: 9 MMOL/L (ref 3–18)
ATRIAL RATE: 36 BPM
BASOPHILS # BLD: 0.1 K/UL (ref 0–0.1)
BASOPHILS NFR BLD: 1 % (ref 0–2)
BUN SERPL-MCNC: 44 MG/DL (ref 7–18)
BUN/CREAT SERPL: 7 (ref 12–20)
CALCIUM SERPL-MCNC: 8.4 MG/DL (ref 8.5–10.1)
CALCIUM SERPL-MCNC: 8.5 MG/DL (ref 8.5–10.1)
CALCULATED R AXIS, ECG10: -29 DEGREES
CALCULATED R AXIS, ECG10: -36 DEGREES
CALCULATED R AXIS, ECG10: -43 DEGREES
CALCULATED T AXIS, ECG11: 42 DEGREES
CALCULATED T AXIS, ECG11: 50 DEGREES
CALCULATED T AXIS, ECG11: 70 DEGREES
CHLORIDE SERPL-SCNC: 100 MMOL/L (ref 100–111)
CO2 SERPL-SCNC: 30 MMOL/L (ref 21–32)
CREAT SERPL-MCNC: 5.95 MG/DL (ref 0.6–1.3)
DIAGNOSIS, 93000: NORMAL
DIFFERENTIAL METHOD BLD: ABNORMAL
EOSINOPHIL # BLD: 0.2 K/UL (ref 0–0.4)
EOSINOPHIL NFR BLD: 3 % (ref 0–5)
ERYTHROCYTE [DISTWIDTH] IN BLOOD BY AUTOMATED COUNT: 15.2 % (ref 11.6–14.5)
FERRITIN SERPL-MCNC: 312 NG/ML (ref 8–388)
GLUCOSE SERPL-MCNC: 127 MG/DL (ref 74–99)
HCT VFR BLD AUTO: 30.4 % (ref 35–45)
HGB BLD-MCNC: 9.3 G/DL (ref 12–16)
IMM GRANULOCYTES # BLD AUTO: 0.1 K/UL (ref 0–0.04)
IMM GRANULOCYTES NFR BLD AUTO: 1 % (ref 0–0.5)
IRON SATN MFR SERPL: 33 % (ref 20–50)
IRON SERPL-MCNC: 78 UG/DL (ref 50–175)
LYMPHOCYTES # BLD: 2.2 K/UL (ref 0.9–3.6)
LYMPHOCYTES NFR BLD: 30 % (ref 21–52)
MAGNESIUM SERPL-MCNC: 2.2 MG/DL (ref 1.6–2.6)
MCH RBC QN AUTO: 29.8 PG (ref 24–34)
MCHC RBC AUTO-ENTMCNC: 30.6 G/DL (ref 31–37)
MCV RBC AUTO: 97.4 FL (ref 78–100)
MONOCYTES # BLD: 0.8 K/UL (ref 0.05–1.2)
MONOCYTES NFR BLD: 11 % (ref 3–10)
NEUTS SEG # BLD: 3.9 K/UL (ref 1.8–8)
NEUTS SEG NFR BLD: 54 % (ref 40–73)
NRBC # BLD: 0 K/UL (ref 0–0.01)
NRBC BLD-RTO: 0 PER 100 WBC
PHOSPHATE SERPL-MCNC: 6 MG/DL (ref 2.5–4.9)
PLATELET # BLD AUTO: 317 K/UL (ref 135–420)
PMV BLD AUTO: 10 FL (ref 9.2–11.8)
POTASSIUM SERPL-SCNC: 4.4 MMOL/L (ref 3.5–5.5)
PTH-INTACT SERPL-MCNC: 406.3 PG/ML (ref 18.4–88)
Q-T INTERVAL, ECG07: 304 MS
Q-T INTERVAL, ECG07: 330 MS
Q-T INTERVAL, ECG07: 348 MS
QRS DURATION, ECG06: 76 MS
QRS DURATION, ECG06: 80 MS
QRS DURATION, ECG06: 82 MS
QTC CALCULATION (BEZET), ECG08: 425 MS
QTC CALCULATION (BEZET), ECG08: 445 MS
QTC CALCULATION (BEZET), ECG08: 488 MS
RBC # BLD AUTO: 3.12 M/UL (ref 4.2–5.3)
SODIUM SERPL-SCNC: 139 MMOL/L (ref 136–145)
TIBC SERPL-MCNC: 234 UG/DL (ref 250–450)
VENTRICULAR RATE, ECG03: 129 BPM
VENTRICULAR RATE, ECG03: 132 BPM
VENTRICULAR RATE, ECG03: 90 BPM
WBC # BLD AUTO: 7.2 K/UL (ref 4.6–13.2)

## 2021-11-14 PROCEDURE — 74011000250 HC RX REV CODE- 250

## 2021-11-14 PROCEDURE — 36415 COLL VENOUS BLD VENIPUNCTURE: CPT

## 2021-11-14 PROCEDURE — 2709999900 HC NON-CHARGEABLE SUPPLY

## 2021-11-14 PROCEDURE — 85025 COMPLETE CBC W/AUTO DIFF WBC: CPT

## 2021-11-14 PROCEDURE — 83970 ASSAY OF PARATHORMONE: CPT

## 2021-11-14 PROCEDURE — 84100 ASSAY OF PHOSPHORUS: CPT

## 2021-11-14 PROCEDURE — 82728 ASSAY OF FERRITIN: CPT

## 2021-11-14 PROCEDURE — 83735 ASSAY OF MAGNESIUM: CPT

## 2021-11-14 PROCEDURE — 74011250637 HC RX REV CODE- 250/637

## 2021-11-14 PROCEDURE — 77030040392 HC DRSG OPTIFOAM MDII -A

## 2021-11-14 PROCEDURE — 65270000029 HC RM PRIVATE

## 2021-11-14 PROCEDURE — 83540 ASSAY OF IRON: CPT

## 2021-11-14 PROCEDURE — 80048 BASIC METABOLIC PNL TOTAL CA: CPT

## 2021-11-14 RX ADMIN — Medication 1 TABLET: at 09:56

## 2021-11-14 RX ADMIN — Medication 10 ML: at 21:37

## 2021-11-14 RX ADMIN — Medication 1 CAPSULE: at 09:00

## 2021-11-14 RX ADMIN — ACETAMINOPHEN 650 MG: 325 TABLET ORAL at 22:45

## 2021-11-14 RX ADMIN — ALLOPURINOL 100 MG: 100 TABLET ORAL at 09:57

## 2021-11-14 RX ADMIN — CHOLECALCIFEROL TAB 25 MCG (1000 UNIT) 2000 UNITS: 25 TAB at 09:57

## 2021-11-14 RX ADMIN — DULOXETINE HYDROCHLORIDE 20 MG: 20 CAPSULE, DELAYED RELEASE ORAL at 09:57

## 2021-11-14 RX ADMIN — MIDODRINE HYDROCHLORIDE 10 MG: 5 TABLET ORAL at 11:22

## 2021-11-14 RX ADMIN — Medication 10 ML: at 13:47

## 2021-11-14 RX ADMIN — MIDODRINE HYDROCHLORIDE 10 MG: 5 TABLET ORAL at 17:01

## 2021-11-14 RX ADMIN — PANTOPRAZOLE SODIUM 20 MG: 20 TABLET, DELAYED RELEASE ORAL at 09:57

## 2021-11-14 RX ADMIN — MIDODRINE HYDROCHLORIDE 10 MG: 5 TABLET ORAL at 09:56

## 2021-11-14 RX ADMIN — APIXABAN 5 MG: 5 TABLET, FILM COATED ORAL at 17:01

## 2021-11-14 RX ADMIN — LEVOTHYROXINE SODIUM 125 MCG: 25 TABLET ORAL at 05:15

## 2021-11-14 RX ADMIN — LATANOPROST 1 DROP: 50 SOLUTION OPHTHALMIC at 21:36

## 2021-11-14 RX ADMIN — NEPHROCAP 1 CAPSULE: 1 CAP ORAL at 09:56

## 2021-11-14 RX ADMIN — Medication 500 MG: at 09:56

## 2021-11-14 RX ADMIN — APIXABAN 5 MG: 5 TABLET, FILM COATED ORAL at 09:56

## 2021-11-14 RX ADMIN — CYANOCOBALAMIN TAB 1000 MCG 1000 MCG: 1000 TAB at 09:56

## 2021-11-14 RX ADMIN — Medication 10 ML: at 05:31

## 2021-11-14 RX ADMIN — HYDROMORPHONE HYDROCHLORIDE 1 MG: 5 SOLUTION ORAL at 00:40

## 2021-11-14 RX ADMIN — HYDROMORPHONE HYDROCHLORIDE 1 MG: 5 SOLUTION ORAL at 18:00

## 2021-11-14 RX ADMIN — CHOLECALCIFEROL TAB 25 MCG (1000 UNIT) 2000 UNITS: 25 TAB at 17:01

## 2021-11-14 NOTE — PROGRESS NOTES
Per Ortho:    Patient alert oriented sitting up at bedside eating breakfast.  Patient denies any chest pain or shortness of breath. Patient reports left hip pain in the groin. Patient status post fall with below CT findings:    Narrative & Impression   EXAM: CT of the pelvis and left hip     INDICATION:  Evaluate for left hip/pelvic fracture, concern for occult fracture  on recent x-ray     TECHNIQUE: CT of  the pelvis and left hip. Axial images of the pelvis with  sagittal and coronal images of the left hip.     All CT scans at this facility are performed using dose optimization technique as  appropriate to a performed exam, to include automated exposure control,  adjustment of the mA and/or kV according to patient size (including appropriate  matching for site specific examination) or use of iterative reconstruction  technique. COMPARISON: Plain film dated 10/5/2021 and CT of the abdomen and pelvis dated  6/18/2021     FINDINGS:   There is a nondisplaced fracture which is mildly comminuted involving the  anterior wall of the acetabulum as well as the base of the superior pubic ramus. There is an additional oblique fracture without evidence of significant  displacement of the left inferior pubic ramus. The pubic symphysis is  unremarkable. The right pubic ring is intact and unremarkable. The ischial  tuberosities bilaterally are intact and unremarkable. The femoral heads are  aligned with the acetabula. No evidence of dislocation of the left hip. No  fracture involving the femoral head or neck identified. The visualized portion  of the left femur is unremarkable. No fracture or dislocation of the right hip  identified.      Calcifications are noted in the distal abdominal aorta and branches. No aneurysm  identified. A right ureteral stent is noted which appears to be well-positioned  within the bladder and right ureter. Visualized uterus and adnexa are grossly  unremarkable.  Visualized portion of the small bowel and colon are unremarkable.     Mild muscular atrophy is noted. No fluid collection or soft tissue mass  identified in the region of the pelvis or proximal thigh.     IMPRESSION  1. Comminuted fracture involving the left anterior acetabular wall with  involvement of the base of the left superior pubic ramus which are not  significantly displaced.      2. Nondisplaced left inferior pubic ramus fracture.            Distal left lower extremity sensation intact. Passive trace left hip internal and external rotation guarded with pain. Plan: PT OT toe-touch weightbearing left lower extremity.

## 2021-11-14 NOTE — PROGRESS NOTES
[address] [facility]  Intern Progress Note    Patient: Mohsen Henderson MRN: [id number]   SSN: [id number]  YOB: 1945   Age: 64 y.o. Sex: female      Admit Date: 11/12/2021    LOS: 2 days   Chief Complaint   Patient presents with    Back Pain       Subjective:     Patient resting comfortably this AM. No complaints. No events overnight. Denies any CP or palpitations. Pain well controlled. Objective:     Visit Vitals  BP 95/62   Pulse 92   Temp 98 °F (36.7 °C)   Resp 18   Ht 5' 2\" (1.575 m)   Wt 83.5 kg (184 lb)   SpO2 96%   BMI 33.65 kg/m²       Physical Exam:   General appearance: alert, cooperative, no distress  Lungs: clear to auscultation bilaterally  Heart: irregular rhythm, regular rate  Abdomen: soft, non-tender  Pulses: 2+ and symmetric    Intake and Output:  Current Shift: No intake/output data recorded. Last three shifts: No intake/output data recorded.     Lab/Data Review:  Recent Results (from the past 12 hour(s))   METABOLIC PANEL, BASIC    Collection Time: 11/14/21  4:24 AM   Result Value Ref Range    Sodium 139 136 - 145 mmol/L    Potassium 4.4 3.5 - 5.5 mmol/L    Chloride 100 100 - 111 mmol/L    CO2 30 21 - 32 mmol/L    Anion gap 9 3.0 - 18 mmol/L    Glucose 127 (H) 74 - 99 mg/dL    BUN 44 (H) 7.0 - 18 MG/DL    Creatinine 5.95 (H) 0.6 - 1.3 MG/DL    BUN/Creatinine ratio 7 (L) 12 - 20      GFR est AA 8 (L) >60 ml/min/1.73m2    GFR est non-AA 7 (L) >60 ml/min/1.73m2    Calcium 8.4 (L) 8.5 - 10.1 MG/DL   MAGNESIUM    Collection Time: 11/14/21  4:24 AM   Result Value Ref Range    Magnesium 2.2 1.6 - 2.6 mg/dL   CBC WITH AUTOMATED DIFF    Collection Time: 11/14/21  4:24 AM   Result Value Ref Range    WBC 7.2 4.6 - 13.2 K/uL    RBC 3.12 (L) 4.20 - 5.30 M/uL    HGB 9.3 (L) 12.0 - 16.0 g/dL    HCT 30.4 (L) 35.0 - 45.0 %    MCV 97.4 78.0 - 100.0 FL    MCH 29.8 24.0 - 34.0 PG    MCHC 30.6 (L) 31.0 - 37.0 g/dL    RDW 15.2 (H) 11.6 - 14.5 %    PLATELET 049 970 - 829 K/uL    MPV 10.0 9.2 - 11.8 FL    NRBC 0.0 0  WBC    ABSOLUTE NRBC 0.00 0.00 - 0.01 K/uL    NEUTROPHILS 54 40 - 73 %    LYMPHOCYTES 30 21 - 52 %    MONOCYTES 11 (H) 3 - 10 %    EOSINOPHILS 3 0 - 5 %    BASOPHILS 1 0 - 2 %    IMMATURE GRANULOCYTES 1 (H) 0.0 - 0.5 %    ABS. NEUTROPHILS 3.9 1.8 - 8.0 K/UL    ABS. LYMPHOCYTES 2.2 0.9 - 3.6 K/UL    ABS. MONOCYTES 0.8 0.05 - 1.2 K/UL    ABS. EOSINOPHILS 0.2 0.0 - 0.4 K/UL    ABS. BASOPHILS 0.1 0.0 - 0.1 K/UL    ABS. IMM. GRANS. 0.1 (H) 0.00 - 0.04 K/UL    DF AUTOMATED     PHOSPHORUS    Collection Time: 11/14/21  4:24 AM   Result Value Ref Range    Phosphorus 6.0 (H) 2.5 - 4.9 MG/DL   IRON PROFILE    Collection Time: 11/14/21  4:24 AM   Result Value Ref Range    Iron 78 50 - 175 ug/dL    TIBC 234 (L) 250 - 450 ug/dL    Iron % saturation 33 20 - 50 %   FERRITIN    Collection Time: 11/14/21  4:24 AM   Result Value Ref Range    Ferritin 312 8 - 388 NG/ML   PTH INTACT    Collection Time: 11/14/21  4:24 AM   Result Value Ref Range    Calcium 8.5 8.5 - 10.1 MG/DL    PTH, Intact PENDING pg/mL       Key Findings or tests:     Labs reviewed this AM . Overall stable. Assessment and Plan:     64 y.o. female with PMH  A fib,ESRD on HD MWF,  chronic hypotension, DM, chronic back pain, depression now admitted with a fib & L hip/pelvic fracture.     A fib. HR 170s at presentation. S/p 15mg dilt in ED. HR now in 90s, no episodes of RVR overnight. EKG with a fib. Cardiology consulted, will follow up recs. On eliquis 5mg BID. Not on any rate controlling meds at home due to chronic hypotension  - Patient currently with stable HR not on any meds. Can admin dilt if HR uncontrolled again.   - FU cards recs  - Continue Eliquis  - Continue tele     L hip/leg pain. CT L Hip: Comminuted fracture involving the left anterior acetabular wall with involvement of the base of the left superior pubic ramus which are not significantly displaced. Nondisplaced left inferior pubic ramus fracture.    - Ortho consulted - non surgical treatment. PT & pain control.   - Pain control: start with home pain regimen - hydromorphone 2 mg tablets -> on dialysis days take 1.5 tablets q12h, on NON dialysis days take 1/2 tablet q12h  - Acute rehab screen placed  - PT/OT     Chronic hypotension. On Midodrine 10 TID.  - Continue home midodrine     ESRD on HD MWF  - Consult nephro for HD while inpatient. - renally dose meds   - avoid nephrotoxic agents    DM   10/8 A1c 4.6. Not on any medications. - monitor BG on daily BMP     Chronic Back Pain 2/2 DDD  Home regimen: gabapentin 100mg take 2 tablets after dialysis; Hydromorphone 2mg prn - 1.5 tabs on dialysis days, 1/2 tab on non dialysis days. - continue home pain regimen     Depression  Home med: cymbalta 20mg  - Continue home cymbalta.  May need to discuss switching med given ESRD as discharge approaches.     Global Care:  - VS per unit routine  - supplemental O2 for sats < 92%  - incentive spirometer  - PT/OT/CM     Diet  Adult Diet   DVT Prophylaxis  SCDs   GI Prophylaxis  Pantoprazole   Code status  DNR   Disposition  Admit to tele      Point of Contact Son: Chriss Youssef  [address] [facility], PGY-3   [facility] Medicine   November 14, 2021, 7:46 AM

## 2021-11-14 NOTE — PROGRESS NOTES
NEW PT orders received and chart Reviewed. Pt on caseload. Pt now with TTWBing orders for LLE. PER ORTHO NOTE:    Per CT  1.  Comminuted fracture involving the left anterior acetabular wall with  involvement of the base of the left superior pubic ramus which are not  significantly displaced.      2.  Nondisplaced left inferior pubic ramus fracture.             Plan: PT OT toe-touch weightbearing left lower extremity.

## 2021-11-14 NOTE — ROUTINE PROCESS
0738-/Bedside and Verbal shift change report given to Andria (oncoming nurse) by Cathy De La Rosa (offgoing nurse). Report included the following information SBAR, MAR and Recent Results. 1028-shift assessment completed. Morning meds given. 1937-/Bedside and Verbal shift change report given to Cathy De La Rosa (oncoming nurse) by Yuki Torres (offgoing nurse). Report included the following information SBAR, MAR and Recent Results.

## 2021-11-14 NOTE — PROGRESS NOTES
PM check     Patient seen at bedside this evening. Well appearing. No complaints. Denies any further heart palpitations. Pain in hip is well controlled at rest. Was a little bit nauseated prior to dinner, given some Zofran and this resolved. Was able to eat her entire dinner.  HR 88 on telemetry, irregular    Gen: NAD  Resp: Breathing comfortably on RA   CV: Irregular pulse     Plan  - continue plan per AM note   - follow up cards daisy Norris, PGY-3   [facility]   November 13, 2021, 9:21 PM

## 2021-11-14 NOTE — PROGRESS NOTES
Reason for Admission:   Afib ([facility] [I48.91]  Atrial fibrillation ([facility] [I48.91]  Hip fracture ([facility] [S72.009A]               RUR Score:     24%             Resources/supports as identified by patient/family:       Top Challenges facing patient (as identified by patient/family and CM): Finances/Medication cost?       Transportation      family  Support system or lack thereof? Needs transport for dialysis  Living arrangements? Lives with son and daughter in law   Self-care/ADLs/Cognition? Was independent        Current Advanced Directive/Advance Care Plan:   yes    Healthcare Decision Maker:   Primary Decision Maker: Kolton Negrete - [phone]    Secondary Decision Maker: Boston Chisholm - Daughter-in-Law - [phone]    Click here to complete [employer] including selection of the Healthcare Decision Maker Relationship (ie \"Primary\")                          Plan for utilizing home health:    TBD, may need SNF but FOC signed for [facility]                      Likelihood of readmission:   HIGH    Transition of Care Plan:                    Initial assessment completed with patient. Cognitive status of patient: oriented to time, place, person and situation. Face sheet information confirmed:  yes. The patient designates sonJose to participate in her discharge plan and to receive any needed information. This patient lives in a single family home with son and daughter in law. Patient is not able to navigate steps as needed. Prior to hospitalization, patient was considered to be independent with ADLs/IADLS : yes . Patient has a current ACP document on file: yes  The son will be available to transport patient home upon discharge. The patient already has [place] [facility], and shower chair available in the home. Patient is not currently active with home health. Patient has not stayed in a skilled nursing facility or rehab.       This patient is on dialysis :yes    If yes, hemodialysis patient and receives treatment on Monday/Wednesday/Friday at [facility] [phone]  Chair time is 10 am. Pt was being transported to/from dialysis by public transport but has been unable to use it lately. List of available Home Health agencies were provided and reviewed with the patient prior to discharge. Freedom of choice signed: yes, for [facility]. Currently, the discharge plan is SNF vs HH. The patient states that she can obtain her medications from the pharmacy, and take her medications as directed. Patient's current insurance is [employer]. .      Care Management Interventions  PCP Verified by CM:  Yes  Mode of Transport at Discharge: Self  Transition of Care Consult (CM Consult): Discharge Planning, [address]: Yes  Support Systems: Child(isaura)  Confirm Follow Up Transport: Family  The Patient and/or Patient Representative was Provided with a Choice of Provider and Agrees with the Discharge Plan?: Yes  Freedom of Choice List was Provided with Basic Dialogue that Supports the Patient's Individualized Plan of Care/Goals, Treatment Preferences and Shares the Quality Data Associated with the Providers?: Yes  Discharge Location  Discharge Placement: Home with home health        Zeke De La Cruz RN - Outcomes Manager  [phone]

## 2021-11-14 NOTE — PROGRESS NOTES
Bedside shift change report given to Andria BRADY (oncoming nurse) by Char Young (offgoing nurse). Report included the following information SBAR, Intake/Output and MAR.

## 2021-11-15 ENCOUNTER — HOME CARE VISIT (OUTPATIENT)
Dept: HOME HEALTH SERVICES | Facility: HOME HEALTH | Age: 76
End: 2021-11-15
Payer: MEDICARE

## 2021-11-15 LAB
ANION GAP SERPL CALC-SCNC: 8 MMOL/L (ref 3–18)
BASOPHILS # BLD: 0.1 K/UL (ref 0–0.1)
BASOPHILS NFR BLD: 1 % (ref 0–2)
BUN SERPL-MCNC: 54 MG/DL (ref 7–18)
BUN/CREAT SERPL: 7 (ref 12–20)
CALCIUM SERPL-MCNC: 7.9 MG/DL (ref 8.5–10.1)
CHLORIDE SERPL-SCNC: 99 MMOL/L (ref 100–111)
CO2 SERPL-SCNC: 29 MMOL/L (ref 21–32)
CREAT SERPL-MCNC: 7.54 MG/DL (ref 0.6–1.3)
DIFFERENTIAL METHOD BLD: ABNORMAL
EOSINOPHIL # BLD: 0.2 K/UL (ref 0–0.4)
EOSINOPHIL NFR BLD: 3 % (ref 0–5)
ERYTHROCYTE [DISTWIDTH] IN BLOOD BY AUTOMATED COUNT: 15.1 % (ref 11.6–14.5)
GLUCOSE SERPL-MCNC: 88 MG/DL (ref 74–99)
HCT VFR BLD AUTO: 28.9 % (ref 35–45)
HGB BLD-MCNC: 8.7 G/DL (ref 12–16)
IMM GRANULOCYTES # BLD AUTO: 0.1 K/UL (ref 0–0.04)
IMM GRANULOCYTES NFR BLD AUTO: 1 % (ref 0–0.5)
LYMPHOCYTES # BLD: 2.8 K/UL (ref 0.9–3.6)
LYMPHOCYTES NFR BLD: 33 % (ref 21–52)
MAGNESIUM SERPL-MCNC: 2.2 MG/DL (ref 1.6–2.6)
MCH RBC QN AUTO: 29.4 PG (ref 24–34)
MCHC RBC AUTO-ENTMCNC: 30.1 G/DL (ref 31–37)
MCV RBC AUTO: 97.6 FL (ref 78–100)
MONOCYTES # BLD: 0.8 K/UL (ref 0.05–1.2)
MONOCYTES NFR BLD: 9 % (ref 3–10)
NEUTS SEG # BLD: 4.5 K/UL (ref 1.8–8)
NEUTS SEG NFR BLD: 53 % (ref 40–73)
NRBC # BLD: 0 K/UL (ref 0–0.01)
NRBC BLD-RTO: 0 PER 100 WBC
PLATELET # BLD AUTO: 313 K/UL (ref 135–420)
PMV BLD AUTO: 9.8 FL (ref 9.2–11.8)
POTASSIUM SERPL-SCNC: 4.8 MMOL/L (ref 3.5–5.5)
RBC # BLD AUTO: 2.96 M/UL (ref 4.2–5.3)
SODIUM SERPL-SCNC: 136 MMOL/L (ref 136–145)
WBC # BLD AUTO: 8.5 K/UL (ref 4.6–13.2)

## 2021-11-15 PROCEDURE — 90935 HEMODIALYSIS ONE EVALUATION: CPT

## 2021-11-15 PROCEDURE — 36415 COLL VENOUS BLD VENIPUNCTURE: CPT

## 2021-11-15 PROCEDURE — 74011250637 HC RX REV CODE- 250/637

## 2021-11-15 PROCEDURE — 74011250636 HC RX REV CODE- 250/636: Performed by: INTERNAL MEDICINE

## 2021-11-15 PROCEDURE — 74011250636 HC RX REV CODE- 250/636

## 2021-11-15 PROCEDURE — P9047 ALBUMIN (HUMAN), 25%, 50ML: HCPCS

## 2021-11-15 PROCEDURE — 83735 ASSAY OF MAGNESIUM: CPT

## 2021-11-15 PROCEDURE — 85025 COMPLETE CBC W/AUTO DIFF WBC: CPT

## 2021-11-15 PROCEDURE — 2709999900 HC NON-CHARGEABLE SUPPLY

## 2021-11-15 PROCEDURE — 74011000250 HC RX REV CODE- 250

## 2021-11-15 PROCEDURE — 80048 BASIC METABOLIC PNL TOTAL CA: CPT

## 2021-11-15 PROCEDURE — 74011250637 HC RX REV CODE- 250/637: Performed by: INTERNAL MEDICINE

## 2021-11-15 PROCEDURE — 65270000029 HC RM PRIVATE

## 2021-11-15 PROCEDURE — 99223 1ST HOSP IP/OBS HIGH 75: CPT | Performed by: INTERNAL MEDICINE

## 2021-11-15 PROCEDURE — 97530 THERAPEUTIC ACTIVITIES: CPT

## 2021-11-15 RX ORDER — AMIODARONE HYDROCHLORIDE 200 MG/1
200 TABLET ORAL DAILY
Status: DISCONTINUED | OUTPATIENT
Start: 2021-11-15 | End: 2021-11-19 | Stop reason: HOSPADM

## 2021-11-15 RX ORDER — ALBUMIN HUMAN 250 G/1000ML
SOLUTION INTRAVENOUS
Status: COMPLETED
Start: 2021-11-15 | End: 2021-11-15

## 2021-11-15 RX ADMIN — Medication 10 ML: at 21:34

## 2021-11-15 RX ADMIN — Medication 10 ML: at 08:57

## 2021-11-15 RX ADMIN — Medication 1 TABLET: at 08:52

## 2021-11-15 RX ADMIN — DOXERCALCIFEROL 4 MCG: 4 INJECTION, SOLUTION INTRAVENOUS at 08:52

## 2021-11-15 RX ADMIN — AMIODARONE HYDROCHLORIDE 200 MG: 200 TABLET ORAL at 13:53

## 2021-11-15 RX ADMIN — CYANOCOBALAMIN TAB 1000 MCG 1000 MCG: 1000 TAB at 08:52

## 2021-11-15 RX ADMIN — CHOLECALCIFEROL TAB 25 MCG (1000 UNIT) 2000 UNITS: 25 TAB at 18:19

## 2021-11-15 RX ADMIN — ACETAMINOPHEN 650 MG: 325 TABLET ORAL at 04:55

## 2021-11-15 RX ADMIN — GABAPENTIN 200 MG: 100 CAPSULE ORAL at 18:22

## 2021-11-15 RX ADMIN — Medication 1 CAPSULE: at 08:51

## 2021-11-15 RX ADMIN — LATANOPROST 1 DROP: 50 SOLUTION OPHTHALMIC at 22:00

## 2021-11-15 RX ADMIN — HYDROMORPHONE HYDROCHLORIDE 1.5 MG: 2 TABLET ORAL at 21:33

## 2021-11-15 RX ADMIN — ACETAMINOPHEN 650 MG: 325 TABLET ORAL at 14:11

## 2021-11-15 RX ADMIN — APIXABAN 5 MG: 5 TABLET, FILM COATED ORAL at 08:51

## 2021-11-15 RX ADMIN — ALBUMIN (HUMAN) 25 G: 0.25 INJECTION, SOLUTION INTRAVENOUS at 10:00

## 2021-11-15 RX ADMIN — ONDANSETRON 4 MG: 2 INJECTION INTRAMUSCULAR; INTRAVENOUS at 21:42

## 2021-11-15 RX ADMIN — APIXABAN 5 MG: 5 TABLET, FILM COATED ORAL at 18:18

## 2021-11-15 RX ADMIN — ALLOPURINOL 100 MG: 100 TABLET ORAL at 08:51

## 2021-11-15 RX ADMIN — MIDODRINE HYDROCHLORIDE 10 MG: 5 TABLET ORAL at 13:53

## 2021-11-15 RX ADMIN — ONDANSETRON 4 MG: 2 INJECTION INTRAMUSCULAR; INTRAVENOUS at 14:16

## 2021-11-15 RX ADMIN — MIDODRINE HYDROCHLORIDE 10 MG: 5 TABLET ORAL at 08:52

## 2021-11-15 RX ADMIN — Medication 500 MG: at 08:51

## 2021-11-15 RX ADMIN — DULOXETINE HYDROCHLORIDE 20 MG: 20 CAPSULE, DELAYED RELEASE ORAL at 09:03

## 2021-11-15 RX ADMIN — CHOLECALCIFEROL TAB 25 MCG (1000 UNIT) 2000 UNITS: 25 TAB at 08:52

## 2021-11-15 RX ADMIN — LEVOTHYROXINE SODIUM 125 MCG: 25 TABLET ORAL at 06:26

## 2021-11-15 RX ADMIN — PANTOPRAZOLE SODIUM 20 MG: 20 TABLET, DELAYED RELEASE ORAL at 06:32

## 2021-11-15 RX ADMIN — Medication 10 ML: at 14:11

## 2021-11-15 RX ADMIN — NEPHROCAP 1 CAPSULE: 1 CAP ORAL at 08:52

## 2021-11-15 RX ADMIN — HYDROMORPHONE HYDROCHLORIDE 1 MG: 5 SOLUTION ORAL at 06:26

## 2021-11-15 RX ADMIN — MIDODRINE HYDROCHLORIDE 10 MG: 5 TABLET ORAL at 18:18

## 2021-11-15 RX ADMIN — EPOETIN ALFA-EPBX 5000 UNITS: 10000 INJECTION, SOLUTION INTRAVENOUS; SUBCUTANEOUS at 21:34

## 2021-11-15 NOTE — PROGRESS NOTES
New OT order received and chart reviewed. Patient evaluated and currently on skilled OT caseload. New order for TTWB on LLE noted. Will acknowledge the order.   Thank you for the referral.  Aquiles Smith MS OTR/L

## 2021-11-15 NOTE — DIALYSIS
ACUTE HEMODIALYSIS FLOW SHEET    HEMODIALYSIS ORDERS: Physician: Dr. Susan Hendrix: [place]   Duration: 3 hr  BFR: 400   DFR: 800   Dialysate:  Temp 36   K+   2    Ca+  2.5   Na 138   Bicarb 35   Wt Readings from Last 1 Encounters:   11/14/21 83.5 kg (184 lb)    Patient Chart [x]   Unable to Obtain []  Dry weight/UF Goal: 2000 ml   Heparin []  Bolus    Units    [] Hourly    Units    [x]None       Pre BP:   113/52    Pulse:  81   Respirations: 18    Temperature:  97.9  [] Oral [] Axillary  Tx: NSS   ml/Bolus   [x] N/A   Labs: []  Pre  []  Post:   [x] N/A   Additional Orders(medications, blood products, hypotension management): [] Yes   [x] No     [x]  [facility] Consent Verified     GRAFT/FISTULA ACCESS:   []N/A     []Right     [x]Left     [x]UE     []LE   []AVG   [x]AVF     []Buttonhole    [x]Medical Aseptic Prep Utilized   [x]No S/S infection  []Redness  []Drainage []Cultured  [] Swelling  [] Pain  Bruit:   [x] Strong    [] Weak       Thrill :   [x] Strong    [] Weak     Needle Gauge: 15   Length: 1 inch   If access problem,  notified: []Yes     [x]N/A     Please describe access if present and not used: N/A     GENERAL ASSESSMENT:    LUNGS:   SaO2%      [x] Clear  [] Coarse  [] Crackles  [] Wheezing                                                [] Diminished     Location : []RLL   []LLL    []RUL  []LALI   Cough: []Productive  []Dry  [x]N/A   Respirations:  [x]Easy  []Labored   Therapy:  [x]RA  []NC l/min    Mask: []NRB  [] Venti    O2%                  []Ventilator  []Intubated  [] Trach  [] BiPaP   CARDIAC: [x]Regular      [] Irregular   [] Pericardial Rub  [] JVD          []  Monitored  [] Bedside  [] Remotely monitored     EDEMA: [x] None  []Generalized  [] Pitting [] 1    [] 2    [] 3    [] 4                 [] Facial  [] Pedal  []  UE  [] LE   SKIN:   [x] Warm  [] Hot     [] Cold   [x] Dry     [] Pale   [] Diaphoretic                  [] Flushed  [] Jaundiced  [] Cyanotic  [] Rash  [] Weeping   LOC:    [x] Alert      [x]Oriented:    [x] Person     [x] Place  [x]Time               [] Confused  [] Lethargic  [] Medicated  [] Non-responsive  [] Non-Verbal   GI / ABDOMEN:                     [] Flat    [] Distended    [x] Soft    [] Firm   []  Obese                   [] Diarrhea  [x] Bowel Sounds  [] Nausea  [] Vomiting     / URINE ASSESSMENT:                   [] Voiding   [x] Oliguria  [] Anuria   []  Cline                  [] Incontinent  []  Incontinent Brief []  Fecal Management System     PAIN:  [x] 0 []1  []2   []3   []4   []5   []6   []7   []8   []9   []10            Scale 0-10  Action/Follow Up:    MOBILITY:  [x] Bed    [] Stretcher      All Vitals and Treatment Details on Attached [address]: [facility]          Room # 046/36   [] 1st Time Acute      [] Stat       [x] Routine      [] Urgent     [x] Acute Room  []  Bedside  [] ICU/CCU  [] ER   Isolation Precautions:  [x] Dialysis    Contact       ALLERGIES:     Allergies   Allergen Reactions    Ciprofloxacin Hives    Cyclopentolate Unknown (comments)    Iron Sucrose Diarrhea    Statins-Hmg-Coa Reductase Inhibitors Other (comments)     Body ache      Code Status:  DNR     Hepatitis Status     No results found for: HAMAT, HAAB, HABT, HAAT, HBSAG, HBSB, HBSAT, HBABN, HBCM, HBCAB, HBCAT, XBCABS, HBEAB, HBEAG, XHEPCS, [id number], HBEGLT, HBCMLT, HBCLT, HBEBLT, [id number], [id number], HAVMLT, [id number], HBCMLT, [id number], HCGAT     Current Labs:      Lab Results   Component Value Date/Time    WBC 8.5 11/15/2021 02:16 AM    Hemoglobin, POC 8.8 (L) 09/24/2020 08:45 AM    HGB 8.7 (L) 11/15/2021 02:16 AM    Hematocrit, POC 26 (L) 09/24/2020 08:45 AM    HCT 28.9 (L) 11/15/2021 02:16 AM    PLATELET 984 83/25/8265 02:16 AM    MCV 97.6 11/15/2021 02:16 AM     Lab Results   Component Value Date/Time    Sodium 136 11/15/2021 02:16 AM    Potassium 4.8 11/15/2021 02:16 AM    Chloride 99 (L) 11/15/2021 02:16 AM    CO2 29 11/15/2021 02:16 AM    Anion gap 8 11/15/2021 02:16 AM    Glucose 88 11/15/2021 02:16 AM    BUN 54 (H) 11/15/2021 02:16 AM    Creatinine 7.54 (H) 11/15/2021 02:16 AM    BUN/Creatinine ratio 7 (L) 11/15/2021 02:16 AM    GFR est AA 6 (L) 11/15/2021 02:16 AM    GFR est non-AA 5 (L) 11/15/2021 02:16 AM    Calcium 7.9 (L) 11/15/2021 02:16 AM          DIET:  DIET ADULT      PRIMARY NURSE REPORT:   Pre Dialysis: Tana Collazo RN    Time: 0830        EDUCATION:    [x] Patient [] Other           Knowledge Basis: []None [x]Minimal [] Substantial []Not able to assess at this time  Barriers to learning  [x]N/A  []Intubated/Ventilated  []Sedated   [] Access Care     [] S&S of infection  [] Fluid Management  [] K+   [x] Procedural    []Albumin   [] Medications   [] Tx Options   [] Transplant   [] Diet   [] Other   Teaching Tools:  [x] Explain  [] Demo  [] Handouts [] Video  Patient response: [x] Verbalized understanding  [] Teach back  [] Return demonstration   [] Requires follow up      [x]Time Out/Safety Check  [x] Extracorporeal Circuit Tested for integrity       RO/HEMODIALYSIS [address] - Before each treatment:     Machine Number:                   [facility]                                    [x] Unit Machine # 7 with centralized RO                                                                       Alarm Test:  Pass time 0830            [x] RO/Machine Log Complete    Machine Temp    36.0             Dialysate: pH  7.4    Conductivity: Meter 13.7     HD Machine  13.9      TCD: 13.9  Dialyzer Lot # [id number]     Blood Tubing Lot # [id number]   Saline Lot # [id number]     CHLORINE TESTING-Before each treatment and every 4 hours    Total Chlorine: [x] less than 0.1 ppm  Initial Time Check: 0900       4 Hr/2nd Check Time:    (if greater than 0.1 ppm from Primary then every 30 minutes from Secondary)     TREATMENT INITIATION - with Dialysis Precautions:   [x] All Connections Secured              [x] Saline Line Double Clamped   [x] Venous Parameters Set               [x] Arterial Parameters Set    [x] Prime Given 250ml NSS              [x]Air Foam Detector Engaged      Treatment Initiation Note:  4253; Pt arrived to HD without any complaints. Pt A &O X 3, follows commands, no distress noted on RA.  VITALY AVFassessed no abnormalities noted, bruit and thrill strong. AVF accessed using  15G needles without any difficulty. Good flows achieved from both needles  0920;  Time out performed per policy, HD commenced, pt tolerated well. During Treatment Notes:  0930;HD in good progress;VSS. Vascular access visible with arterial and venous line connections intact. 1000;HD in good progress,BP dropped to 89/53. Albumin administered per policy. Vascular access visible with arterial and venous line connections intact. 1030;HD in good progress,VSS. Vascular access visible with arterial and venous line connections intact. 1100;HD in good progress,VSS. Vascular access visible with arterial and venous line connections intact. 1130;HD in good progress,VSS. Vascular access visible with arterial and venous line connections intact. 1200;HD in good progress, VSS. Vascular access visible with arterial and venous line connections intact. 1226; Dialysis treatment completed. Hectorol not given post HD since patient received a dose this morning       Medication Dose Volume Route Time Dat Nurse, Title   Albumin 25g 100ml HD 1000 MACK Laboy RN     Post Assessment  Dialyzer Cleared:[] Good [x] Fair  [] Poor  Blood processed:  69.3 L  UF Removed:  1700 Ml  Post /62  Pulse  89 Resp  18   Temp 97.8  [] Oral [] Axillary Lungs: [] Clear                [x] No change from initial assessment   Post Tx Vascular Access: [] N/A  AVF/AVG: Bleeding stopped with  Arterial Pressure for 5 min   Venous Pressure for 5 min           Cardiac:  [x] Regular   [] Irregular   Rhythm:  [] Monitored   [x] Not Monitored   Edema;     [x]    None;   []   Generalised   Skin:[x] Warm  [x] Dry [] Diaphoretic               [] Flushed  [] Pale [] Cyanotic   Pain:  [x]0  []1 []2  []3 []4  []5  []6 []7 []8  []9  []10       Post Treatment Note:  2012; VSS. Arterial and venous needles removed and pressure applied until hemostasis achieved. Dry dressings applied. Bruit/Thrill present above and below dressings.  Dressing dry , clean and intact        POST TREATMENT PRIMARY NURSE HANDOFF REPORT:   Post Dialysis: JOSE ANTONIO Dowling                Time:  1250       Abbreviations: AVG-arterial venous graft, AVF-arterial venous fistula, IJ-Internal Jugular, Subcl-Subclavian, Fem-Femoral, Tx-treatment, AP/HR-apical heart rate, DFR-dialysate flow rate, BFR-blood flow rate, AP-arterial pressure, -venous pressure, UF-ultrafiltrate, TMP-transmembrane pressure, [place]asper-Venous, Art-Arterial, RO-Reverse Osmosis

## 2021-11-15 NOTE — PROGRESS NOTES
Problem: Pressure Injury - Risk of  Goal: *Prevention of pressure injury  Description: Document Giovany Scale and appropriate interventions in the flowsheet. Outcome: Progressing Towards Goal  Note: Pressure Injury Interventions:  Sensory Interventions: Assess changes in LOC, Check visual cues for pain    Moisture Interventions: Absorbent underpads, Minimize layers    Activity Interventions: Pressure redistribution bed/mattress(bed type)    Mobility Interventions: HOB 30 degrees or less, Pressure redistribution bed/mattress (bed type)    Nutrition Interventions: Document food/fluid/supplement intake       Problem: Falls - Risk of  Goal: *Absence of Falls  Description: Document Freddy Fall Risk and appropriate interventions in the flowsheet.   11/15/2021 1015 by Pebbles Vincent  Outcome: Progressing Towards Goal  Note: Fall Risk Interventions:  Mobility Interventions: Patient to call before getting OOB, Assess mobility with egress test, Bed/chair exit alarm    Medication Interventions: Bed/chair exit alarm, Patient to call before getting OOB    Elimination Interventions: Call light in reach, Bed/chair exit alarm, Patient to call for help with toileting needs    History of Falls Interventions: Door open when patient unattended, Bed/chair exit alarm

## 2021-11-15 NOTE — CASE COMMUNICATION
Called pt to schedule for tomorrow's visit and was told that pt is in [facility] for double pelvic FX. Pt stated that they plan to send her to Rehab.

## 2021-11-15 NOTE — Clinical Note
Thanks for update. Yoly Hoang  ----- Message -----  From: Miladis Jackson, PTA  Sent: 11/15/2021   4:16 PM EST  To: Pat Guerra, PT      Called pt to schedule for tomorrow's visit and was told that pt is in [facility] for double pelvic FX. Pt stated that they plan to send her to Rehab.

## 2021-11-15 NOTE — Clinical Note
Poor lady, thank you for the update  ----- Message -----  From: Regino Cash PTA  Sent: 11/15/2021   4:16 PM EST  To: Loreto Chinchilla RN      Called pt to schedule for tomorrow's visit and was told that pt is in [facility] for double pelvic FX. Pt stated that they plan to send her to Rehab.

## 2021-11-15 NOTE — PROGRESS NOTES
[address] [facility]  Intern Progress Note    Patient: Valentina Gaffney MRN: [id number]   SSN: [id number]  YOB: 1945   Age: 64 y.o. Sex: female      Admit Date: 11/12/2021    LOS: 3 days   Chief Complaint   Patient presents with    Back Pain       Subjective:     No acute events overnight. Patient resting comfortably this AM. Endorses L upper thigh pain and R hip pain which impacted sleep and occurs when she shifts in bed. No F/C, N/V, CP, SOB. Objective:     Visit Vitals  /65 (BP 1 Location: Right arm, BP Patient Position: At rest)   Pulse 88   Temp 97.9 °F (36.6 °C)   Resp 18   Ht 5' 2\" (1.575 m)   Wt 83.5 kg (184 lb)   SpO2 97%   BMI 33.65 kg/m²       Physical Exam:   General appearance: NAD. Pleasant, cooperative. Lungs: CTAB. No wheezes, rales, or rhonchi. Heart: irregular rhythm, regular rate. No M/R/G    Abdomen: soft, NT, ND  MSK: No warmth, erythema or TTP on BLE. Pulses: 2+ and symmetric    Intake and Output:  Current Shift: No intake/output data recorded.   Last three shifts: 11/13 1901 - 11/15 0700  In: 237 [P.O.:237]  Out: -     Lab/Data Review:  Recent Results (from the past 12 hour(s))   METABOLIC PANEL, BASIC    Collection Time: 11/15/21  2:16 AM   Result Value Ref Range    Sodium 136 136 - 145 mmol/L    Potassium 4.8 3.5 - 5.5 mmol/L    Chloride 99 (L) 100 - 111 mmol/L    CO2 29 21 - 32 mmol/L    Anion gap 8 3.0 - 18 mmol/L    Glucose 88 74 - 99 mg/dL    BUN 54 (H) 7.0 - 18 MG/DL    Creatinine 7.54 (H) 0.6 - 1.3 MG/DL    BUN/Creatinine ratio 7 (L) 12 - 20      GFR est AA 6 (L) >60 ml/min/1.73m2    GFR est non-AA 5 (L) >60 ml/min/1.73m2    Calcium 7.9 (L) 8.5 - 10.1 MG/DL   MAGNESIUM    Collection Time: 11/15/21  2:16 AM   Result Value Ref Range    Magnesium 2.2 1.6 - 2.6 mg/dL   CBC WITH AUTOMATED DIFF    Collection Time: 11/15/21  2:16 AM   Result Value Ref Range    WBC 8.5 4.6 - 13.2 K/uL    RBC 2.96 (L) 4.20 - 5.30 M/uL    HGB 8.7 (L) 12.0 - 16.0 g/dL    HCT 28.9 (L) 35.0 - 45.0 %    MCV 97.6 78.0 - 100.0 FL    MCH 29.4 24.0 - 34.0 PG    MCHC 30.1 (L) 31.0 - 37.0 g/dL    RDW 15.1 (H) 11.6 - 14.5 %    PLATELET 745 134 - 972 K/uL    MPV 9.8 9.2 - 11.8 FL    NRBC 0.0 0  WBC    ABSOLUTE NRBC 0.00 0.00 - 0.01 K/uL    NEUTROPHILS 53 40 - 73 %    LYMPHOCYTES 33 21 - 52 %    MONOCYTES 9 3 - 10 %    EOSINOPHILS 3 0 - 5 %    BASOPHILS 1 0 - 2 %    IMMATURE GRANULOCYTES 1 (H) 0.0 - 0.5 %    ABS. NEUTROPHILS 4.5 1.8 - 8.0 K/UL    ABS. LYMPHOCYTES 2.8 0.9 - 3.6 K/UL    ABS. MONOCYTES 0.8 0.05 - 1.2 K/UL    ABS. EOSINOPHILS 0.2 0.0 - 0.4 K/UL    ABS. BASOPHILS 0.1 0.0 - 0.1 K/UL    ABS. IMM. GRANS. 0.1 (H) 0.00 - 0.04 K/UL    DF AUTOMATED         Key Findings or tests:     Labs reviewed this AM . Overall stable. Assessment and Plan:     64 y.o. female with PMH  A fib,ESRD on HD MWF,  chronic hypotension, DM, chronic back pain, depression now admitted with a fib & L hip/pelvic fracture.     A fib. HR 170s at presentation. S/p 15mg dilt in ED. HR now in 90s, no episodes of RVR overnight. EKG with a fib. Cardiology consulted, will follow up recs. On eliquis 5mg BID. Not on any rate controlling meds at home due to chronic hypotension  - Patient currently with stable HR not on any meds. Can admin dilt if HR uncontrolled again.   - FU cards recs  - Continue Eliquis  - Continue tele     L hip/leg pain. CT L Hip: Comminuted fracture involving the left anterior acetabular wall with involvement of the base of the left superior pubic ramus which are not significantly displaced. Nondisplaced left inferior pubic ramus fracture. - Ortho consulted - non surgical treatment. PT & pain control.   - Pain control: start with home pain regimen - hydromorphone 2 mg tablets -> on dialysis days take 1.5 tablets q12h, on NON dialysis days take 1/2 tablet q12h  - Acute rehab screen placed  - PT/OT     Chronic hypotension.   On Midodrine 10 TID.  - Continue home midodrine     ESRD on HD MWF  - Consult nephro for HD while inpatient. - renally dose meds   - avoid nephrotoxic agents    DM   10/8 A1c 4.6. Not on any medications. - monitor BG on daily BMP     Chronic Back Pain 2/2 DDD  Home regimen: gabapentin 100mg take 2 tablets after dialysis; Hydromorphone 2mg prn - 1.5 tabs on dialysis days, 1/2 tab on non dialysis days. - continue home pain regimen     Depression  Home med: cymbalta 20mg  - Continue home cymbalta.  May need to discuss switching med given ESRD as discharge approaches.     [employer]:  - VS per unit routine  - supplemental O2 for sats < 92%  - incentive spirometer  - PT/OT/CM     Diet  Adult Diet   DVT Prophylaxis  SCDs   GI Prophylaxis  Pantoprazole   Code status  DNR   Disposition  Admit to tele      Point of Contact Son: Jeff Bui  [phone] [facility], PGY-1   [address] [facility]   November 15, 2021, 7:46 AM

## 2021-11-15 NOTE — PROGRESS NOTES
RENAL DAILY PROGRESS NOTE              Subjective:       Complaint:   At 12:31 PM on 11/15/2021, I saw and examined patient during hemodialysis treatment. The patient was receiving hemodialysis for treatment of  renal failure. I have also reviewed vital signs, intake and output, lab results and recent events, and agreed with today's dialysis order. Overnight events noted  no nausea, vomiting, chest pain, short of breath, cough, seizure. IMPRESSION:   IMPRESSION:   · esrd mwf dialysis  · Pelvic fx,non displaced  · A fib  · Chronic hypotension,on midodrine  · Secondary hyperparathyroidism  · Anemia of chronic disease      PLAN:   C/w hectorol and retacrit  Saw on dialysis. Tolerating well  Avoid Gadolinium due to its association with nephrogenic systemic fibrosis in a patients with severe ARF and ESRD.    Please dose all medications for creatinine clearance <15/dialysis.    Avoid blood pressure checks, blood draws, peripheral iv's on arm with access. AvoidPICC lines on either arm in order to preserve veins for dialysis access creation.               Current Facility-Administered Medications   Medication Dose Route Frequency    epoetin caitlin-epbx (RETACRIT) injection 5,000 Units  5,000 Units SubCUTAneous Q MON, WED & FRI    cholecalciferol (VITAMIN D3) (1000 Units /25 mcg) tablet 2,000 Units  2,000 Units Oral BID    trimethobenzamide (TIGAN) injection 200 mg  200 mg IntraMUSCular Q6H PRN    doxercalciferoL (HECTOROL) 4 mcg/2 mL injection 4 mcg  4 mcg IntraVENous DIALYSIS MON, WED & FRI    sodium chloride (NS) flush 5-40 mL  5-40 mL IntraVENous Q8H    sodium chloride (NS) flush 5-40 mL  5-40 mL IntraVENous PRN    acetaminophen (TYLENOL) tablet 650 mg  650 mg Oral Q6H PRN    Or    acetaminophen (TYLENOL) suppository 650 mg  650 mg Rectal Q6H PRN    polyethylene glycol (MIRALAX) packet 17 g  17 g Oral DAILY PRN    ondansetron (ZOFRAN ODT) tablet 4 mg  4 mg Oral Q8H PRN    Or    ondansetron (ZOFRAN) injection 4 mg  4 mg IntraVENous Q6H PRN    allopurinoL (ZYLOPRIM) tablet 100 mg  100 mg Oral DAILY    ascorbic acid (vitamin C) (VITAMIN C) tablet 500 mg  500 mg Oral DAILY    vitamin B complex-folic acid 0.4 mg tablet  1 Tablet Oral DAILY    biotin chew 1 Tablet (Patient Supplied)  1 Tablet Oral DAILY    cyanocobalamin tablet 1,000 mcg  1,000 mcg Oral DAILY    DULoxetine (CYMBALTA) capsule 20 mg  20 mg Oral DAILY    gabapentin (NEURONTIN) capsule 200 mg  200 mg Oral 3 times weekly    L. acidophilus,casei,rhamnosus (BIO-K PLUS) capsule 1 Capsule  1 Capsule Oral DAILY    latanoprost (XALATAN) 0.005 % ophthalmic solution 1 Drop  1 Drop Both Eyes QHS    levothyroxine (SYNTHROID) tablet 125 mcg  125 mcg Oral ACB    lidocaine 4 % patch 1 Patch  1 Patch TransDERmal DAILY    midodrine (PROAMATINE) tablet 10 mg  10 mg Oral TID WITH MEALS    pantoprazole (PROTONIX) tablet 20 mg  20 mg Oral ACB    B complex-vitaminC-folic acid (NEPHROCAP) cap  1 Capsule Oral DAILY    apixaban (ELIQUIS) tablet 5 mg  5 mg Oral BID    HYDROmorphone (DILAUDID) 1 mg/mL oral solution 1 mg  1 mg Oral Q12H PRN    HYDROmorphone (DILAUDID) tablet 1.5 mg  1.5 mg Oral Q12H PRN       Review of Symptoms: comprehensive ROS negative except above.    Objective:     Patient Vitals for the past 24 hrs:   Temp Pulse Resp BP SpO2   11/15/21 1215 -- (!) 102 -- (!) 131/58 --   11/15/21 1200 -- 70 -- 100/64 --   11/15/21 1145 -- 92 -- 128/66 --   11/15/21 1130 -- 95 -- 123/67 --   11/15/21 1115 -- 92 -- 96/79 --   11/15/21 1100 -- 84 -- 126/70 --   11/15/21 1045 -- 83 -- (!) 110/37 --   11/15/21 1030 -- 80 -- 105/82 --   11/15/21 1015 -- 78 -- (!) 117/58 --   11/15/21 1000 -- 75 -- (!) 89/53 --   11/15/21 0945 -- 74 -- (!) 100/54 --   11/15/21 0930 -- 61 -- (!) 167/95 --   11/15/21 0920 -- 79 -- (!) 110/39 --   11/15/21 0917 97.9 °F (36.6 °C) 81 18 (!) 113/52 --   11/15/21 0831 97.9 °F (36.6 °C) 88 18 100/65 97 %   11/15/21 0433 98 °F (36.7 °C) 78 18 (!) 111/53 95 %   11/15/21 0008 98.6 °F (37 °C) 82 18 (!) 113/52 95 %   11/14/21 2014 98.5 °F (36.9 °C) 81 18 101/64 94 %   11/14/21 1525 98.7 °F (37.1 °C) 86 19 (!) 99/52 95 %        Weight change:      11/13 1901 - 11/15 0700  In: 237 [P.O.:237]  Out: -     Intake/Output Summary (Last 24 hours) at 11/15/2021 1231  Last data filed at 11/14/2021 2135  Gross per 24 hour   Intake 237 ml   Output --   Net 237 ml     Physical Exam:   General: comfortable, no acute distress   HEENT sclera anicteric, supple neck, no thyromegaly  CVS: S1S2 heard,  no rub  RS: + air entry b/l,   Abd: Soft, Non tender, Not distended, Positive bowel sounds, no organomegaly, no CVA / supra pubic tenderness  Extrm: plus 2 edema, no cyanosis, clubbing   Skin: no visible  Rash  Musculoskeletal: No gross joints or bone deformities         Data Review:     LABS:   Hematology:   Recent Labs     11/15/21  0216 11/14/21  0424 11/13/21  0445 11/12/21  1520   WBC 8.5 7.2 6.1 7.6   HGB 8.7* 9.3* 9.8* 11.0*   HCT 28.9* 30.4* 32.1* 35.2     Chemistry:   Recent Labs     11/15/21  0216 11/14/21  0424 11/13/21  0445 11/12/21  1520   BUN 54* 44* 28* 17   CREA 7.54* 5.95* 4.30* 3.12*   CA 7.9* 8.5  8.4* 8.3* 9.4   K 4.8 4.4 4.7 3.5    139 138 136   CL 99* 100 101 94*   CO2 29 30 28 31   PHOS  --  6.0*  --   --    GLU 88 127* 131* 99            Procedures/imaging: see electronic medical records for all procedures, Xrays and details which were not copied into this note but were reviewed prior to creation of Plan          Assessment & Plan:       See above      Kim Pappas MD  11/15/2021

## 2021-11-15 NOTE — CONSULTS
Cardiology Initial Patient Referral Note    Cardiology referral request from  for evaluation and management/treatment of Atrial Fibrillation     Date of  Admission: 11/12/2021  3:36 PM   Primary Care Physician:  Fidel Alcocer MD    Attending Cardiologist: Robbie        Assessment:     1. Paroxysmal Atrial fibrillation with RVR- with elevated rates up to 170's. Patient is symptomatic she reports palpitations and SOB. Intermittent symptoms for the past year, now with final diagnosis. 2. Mechanical fall- with injury. No LOC as cause. 3. Chronic hypotension-on midodrine at home  10 mg po TID  4. Ureteral stricture s/p stent exchange 10/5.  5. Diabetes mellitus. 6. ESRD on HD.  7. Hypothyroidism on synthroid with Abnromal TSH 11/21  8. Chronic anemia. 9. Chronic back pain. 10. Depression. 11. Left Hip pain- CT showing Comminuted fracture involving the left anterior acetabular wall with involvement of the base of the left superior pubic ramus which are not significantly displaced- conservative management per ortho    Normal EF 55-60% by echo 10/2021. Low risk nuclear stress 11/2020.     Primary cardiologist Dr. Herminia Murphy. She wants to follow with Dr. Penny Sterling      Atrial fibrillation CHADSVASC2 score stroke risk:   64 y.o.                                        > 76        +2   female                                         Female +1  CHF hx                                           No    + 0  HTN hx                                           No    + 0  Stroke/TIA/Thromboembolism       No    +0  Vascular disease hx                       No    + 0  Diabetes Mellitus                            Yes    +1   CHADSVASC2 score                    4      Annual Stroke Risk 4.8%- moderate-high        CT pelvis 11/21  1. Comminuted fracture involving the left anterior acetabular wall with  involvement of the base of the left superior pubic ramus which are not  significantly displaced.    2.  Nondisplaced left inferior pubic ramus fracture. Primary cardiologist Herminia Murphy wants to follow with Dr. Penny Sterling after discharge      Plan:      SVT vs atrial fib with RVR with frequent runs HR up to 170's with activity. Currently NSR   She is symptomstic when rates are up. Unable to tolerate  AV rubina blockers in the setting chronic hypotension. Patient requiring midodrine 10 mg po TID  Will consider Amiodarone po for rate control. Consideration for ablation vs Cardioversion per Dr Gely Woodard anticoagulation for stroke prevention   No clinical evidence fluid overload. Volume status per Nephrology service      Staff addendum:  She is s/p mechanical fall, no LOC with hip fracture, conservatively treated. She is found to have paroxysmal A.fib/RVR with symptoms. Unfortunately, given recent fall, need to monitor closely since on Eliquis for stroke prevention. Given chronically low blood pressure with need for midodrine, limited options for treatment of rate control. I discussed with patient starting low dose amiodarone and monitoring. Side effects of liver, lung, thyroid, skin discussed. I saw, examined, and evaluated the patient. I personally reviewed the patient's labs, tests, vitals, orders, medications, updated history, and other providers assessments. I personally agree with the findings as stated and the plan as documented. Deepthi Darby MD       History of Present Illness: This is a 64 y.o. female admitted for Afib ([facility] [address] [I48.91]Atrial fibrillation ([facility] [address] [I48.91]Hip fracture ([facility] [address] Kaylee Salazar. Patient with PMH A fib, ESRD on HD MWF,  chronic hypotension, DM, chronic back pain, depression now admitted with A fib & L hip/pelvic fracture. Patient reports on Sunday she was having palpitations and had a fall. She went to ED for L hip & leg pain workup which was negative for acute fracture. Since then she has had persistent left lateral lower extremity pain that is restricting ambulation.  Today she was at dialysis and was unable to bear weight or ambulate, so EMS was contacted and she was transported to ED. On arrival patient was tachycardic to 170s and with a fib on EKG. She was given 10mg diltiazem. HR responded well, down to 90s/low 100s. Repeat EKG showed possible A fib.          Review of Symptoms:  Except as stated above include:  Constitutional:  negative  Respiratory: SOB  Cardiovascular:  Palpitations   Gastrointestinal: negative  Genitourinary:  negative  Musculoskeletal:left hip pain   Neurological:  Negative  Dermatological:  Negative  Endocrinological: Negative  Psychological:  Negative         Past Medical History:     Past Medical History:   Diagnosis Date    Acidosis     Anemia     Arteriovenous fistula (HCC)     Chronic kidney disease     on HD at [facility] on [address] on MWF. [phone]    Chronic pain     CKD (chronic kidney disease)     Diabetes ([facility] [address]     no meds now    ESRD (end stage renal disease) on dialysis ([facility] [address] 9/9/2021    HLD (hyperlipidemia)     HTN (hypertension)     Hyperparathyroidism due to renal insufficiency (HCC)     Hypothyroid     Kidney stone     Lung mass     Recurrent UTI     Ureter, stricture     Uric acid nephrolithiasis     Urinary incontinence          Social History:     Social History     Socioeconomic History    Marital status: SINGLE   Tobacco Use    Smoking status: Never Smoker    Smokeless tobacco: Never Used   Vaping Use    Vaping Use: Never used   Substance and Sexual Activity    Alcohol use: Never    Drug use: Never        Family History:     Family History   Problem Relation Age of Onset    Heart Surgery Sister         Medications:      Allergies   Allergen Reactions    Ciprofloxacin Hives    Cyclopentolate Unknown (comments)    Iron Sucrose Diarrhea    Statins-Hmg-Coa Reductase Inhibitors Other (comments)     Body ache        Current Facility-Administered Medications   Medication Dose Route Frequency    epoetin caitlin-epbx (RETACRIT) injection 5,000 Units  5,000 Units SubCUTAneous Q MON, WED & FRI    cholecalciferol (VITAMIN D3) (1000 Units /25 mcg) tablet 2,000 Units  2,000 Units Oral BID    trimethobenzamide (TIGAN) injection 200 mg  200 mg IntraMUSCular Q6H PRN    doxercalciferoL (HECTOROL) 4 mcg/2 mL injection 4 mcg  4 mcg IntraVENous DIALYSIS MON, WED & FRI    sodium chloride (NS) flush 5-40 mL  5-40 mL IntraVENous Q8H    sodium chloride (NS) flush 5-40 mL  5-40 mL IntraVENous PRN    acetaminophen (TYLENOL) tablet 650 mg  650 mg Oral Q6H PRN    Or    acetaminophen (TYLENOL) suppository 650 mg  650 mg Rectal Q6H PRN    polyethylene glycol (MIRALAX) packet 17 g  17 g Oral DAILY PRN    ondansetron (ZOFRAN ODT) tablet 4 mg  4 mg Oral Q8H PRN    Or    ondansetron (ZOFRAN) injection 4 mg  4 mg IntraVENous Q6H PRN    allopurinoL (ZYLOPRIM) tablet 100 mg  100 mg Oral DAILY    ascorbic acid (vitamin C) (VITAMIN C) tablet 500 mg  500 mg Oral DAILY    vitamin B complex-folic acid 0.4 mg tablet  1 Tablet Oral DAILY    biotin chew 1 Tablet (Patient Supplied)  1 Tablet Oral DAILY    cyanocobalamin tablet 1,000 mcg  1,000 mcg Oral DAILY    DULoxetine (CYMBALTA) capsule 20 mg  20 mg Oral DAILY    gabapentin (NEURONTIN) capsule 200 mg  200 mg Oral 3 times weekly    L. acidophilus,casei,rhamnosus (BIO-K PLUS) capsule 1 Capsule  1 Capsule Oral DAILY    latanoprost (XALATAN) 0.005 % ophthalmic solution 1 Drop  1 Drop Both Eyes QHS    levothyroxine (SYNTHROID) tablet 125 mcg  125 mcg Oral ACB    lidocaine 4 % patch 1 Patch  1 Patch TransDERmal DAILY    midodrine (PROAMATINE) tablet 10 mg  10 mg Oral TID WITH MEALS    pantoprazole (PROTONIX) tablet 20 mg  20 mg Oral ACB    B complex-vitaminC-folic acid (NEPHROCAP) cap  1 Capsule Oral DAILY    apixaban (ELIQUIS) tablet 5 mg  5 mg Oral BID    HYDROmorphone (DILAUDID) 1 mg/mL oral solution 1 mg  1 mg Oral Q12H PRN    HYDROmorphone (DILAUDID) tablet 1.5 mg  1.5 mg Oral Q12H PRN Physical Exam:     Visit Vitals  /65 (BP 1 Location: Right arm, BP Patient Position: At rest)   Pulse 88   Temp 97.9 °F (36.6 °C)   Resp 18   Ht 5' 2\" (1.575 m)   Wt 83.5 kg (184 lb)   SpO2 97%   BMI 33.65 kg/m²       TELE: NSR    BP Readings from Last 3 Encounters:   11/15/21 100/65   11/11/21 118/60   11/10/21 135/61     Pulse Readings from Last 3 Encounters:   11/15/21 88   11/11/21 75   11/10/21 86     Wt Readings from Last 3 Encounters:   11/14/21 83.5 kg (184 lb)   11/07/21 93 kg (205 lb 0.4 oz)   10/14/21 96.6 kg (212 lb 14.4 oz)       General:  alert, cooperative, no distress, appears stated age  Neck:  no stridor, no carotid bruit, no JVD  Lungs:  clear to auscultation bilaterally  Heart:  regular rate and rhythm, S1, S2 normal, no murmur, click, rub or gallop  Abdomen:  abdomen is soft without significant tenderness, masses, organomegaly or guarding  Extremities:  extremities normal, atraumatic, no cyanosis or edema  Skin: Warm and dry. no hyperpigmentation, vitiligo, or suspicious lesions  Neuro: alert, oriented x3, affect appropriate, no focal neurological deficits, moves all extremities well, no involuntary movements, reflexes at knee and ankle intact       Data Review:     Recent Labs     11/15/21  0216 11/14/21  0424 11/13/21  0445   WBC 8.5 7.2 6.1   HGB 8.7* 9.3* 9.8*   HCT 28.9* 30.4* 32.1*    317 312     Recent Labs     11/15/21  0216 11/14/21  0424 11/13/21  0445 11/12/21  1520 11/12/21  1520    139 138   < > 136   K 4.8 4.4 4.7   < > 3.5   CL 99* 100 101   < > 94*   CO2 29 30 28   < > 31   GLU 88 127* 131*   < > 99   BUN 54* 44* 28*   < > 17   CREA 7.54* 5.95* 4.30*   < > 3.12*   CA 7.9* 8.5  8.4* 8.3*   < > 9.4   MG 2.2 2.2 2.3  --   --    PHOS  --  6.0*  --   --   --    INR  --   --   --   --  1.4*    < > = values in this interval not displayed.        Results for orders placed or performed during the hospital encounter of 11/12/21   EKG, 12 LEAD, INITIAL   Result Value Ref Range    Ventricular Rate 132 BPM    QRS Duration 76 ms    Q-T Interval 330 ms    QTC Calculation (Bezet) 488 ms    Calculated R Axis -29 degrees    Calculated T Axis 50 degrees    Diagnosis       atrial fibrillation with rvr  premature ventricular complexes  Otherwise normal ECG  When compared with ECG of 12-NOV-2021 16:10,  Sinus rhythm has replaced Atrial fibrillation  Confirmed by La Johnson ([id number]) on 11/14/2021 12:53:12 PM         All Cardiac Markers in the last 24 hours:  No results found for: CPK, CK, CKMMB, CKMB, RCK3, CKMBT, CKNDX, CKND1, PATTI, TROPT, TROIQ, GRAHAM, TROPT, TNIPOC, BNP, BNPP    Last Lipid:  No results found for: CHOL, CHOLX, CHLST, CHOLV, HDL, HDLP, LDL, LDLC, DLDLP, TGLX, TRIGL, TRIGP, CHHD, CHHDX    Cardiographics:     EKG Results     Procedure 720 Value Units Date/Time    EKG, 12 LEAD, REPEAT [[id number]] Collected: 11/12/21 1610    Order Status: Completed Updated: 11/14/21 1253     Ventricular Rate 90 BPM      Atrial Rate 36 BPM      QRS Duration 82 ms      Q-T Interval 348 ms      QTC Calculation (Bezet) 425 ms      Calculated R Axis -43 degrees      Calculated T Axis 70 degrees      Diagnosis --     Atrial fibrillation with premature ventricular or aberrantly conducted   complexes  Left axis deviation  Nonspecific ST and T wave abnormality  Abnormal ECG  When compared with ECG of 12-NOV-2021 15:12,  Atrial fibrillation has replaced Sinus rhythm  Vent.  rate has decreased BY  85 BPM  ST less depressed in Inferior leads  Nonspecific T wave abnormality, improved in Lateral leads  Confirmed by La Johnson ([id number]) on 11/14/2021 12:53:19 PM      EKG, 12 LEAD, INITIAL [[id number]] Collected: 11/13/21 0203    Order Status: Completed Updated: 11/14/21 1253     Ventricular Rate 132 BPM      QRS Duration 76 ms      Q-T Interval 330 ms      QTC Calculation (Bezet) 488 ms      Calculated R Axis -29 degrees      Calculated T Axis 50 degrees      Diagnosis --     atrial fibrillation with rvr  premature ventricular complexes  Otherwise normal ECG  When compared with ECG of 12-NOV-2021 16:10,  Sinus rhythm has replaced Atrial fibrillation  Confirmed by Syed Olson ([id number]) on 11/14/2021 12:53:12 PM      EKG, 12 LEAD, SUBSEQUENT [[id number]] Collected: 11/13/21 1202    Order Status: Completed Updated: 11/14/21 1247     Ventricular Rate 129 BPM      QRS Duration 80 ms      Q-T Interval 304 ms      QTC Calculation (Bezet) 445 ms      Calculated R Axis -36 degrees      Calculated T Axis 42 degrees      Diagnosis --     Atrial fibrillation with rapid ventricular response  Left axis deviation  Possible Anterior infarct , age undetermined  Abnormal ECG  When compared with ECG of 13-NOV-2021 12:00,  Atrial fibrillation has replaced Sinus rhythm  Confirmed by Syed Olson ([id number]) on 11/14/2021 12:47:33 PM      EKG, 12 LEAD, INITIAL [[id number]] Collected: 11/12/21 1512    Order Status: Completed Updated: 11/13/21 0930     Ventricular Rate 175 BPM      QRS Duration 76 ms      Q-T Interval 278 ms      QTC Calculation (Bezet) 474 ms      Calculated R Axis -86 degrees      Calculated T Axis 52 degrees      Diagnosis --     Supraventricular tachycardia , possible atrial flutter with 2:1 conduction  Left axis deviation  Nonspecific ST and T wave abnormality  Abnormal ECG  When compared with ECG of 12-OCT-2021 09:56,  Vent. rate has increased BY  80 BPM  ST now depressed in Lateral leads    Confirmed by Philly Frankel ([id number]) on 11/13/2021 9:29:55 AM          10/08/21    ECHO ADULT FOLLOW-UP OR LIMITED 10/10/2021 10/10/2021    Interpretation Summary  · LV: Estimated LVEF is 55 - 60%. Visually measured ejection fraction. Normal cavity size and systolic function (ejection fraction normal). Mild concentric hypertrophy. Wall motion: normal. Age-appropriate left ventricular diastolic function. · PA: Pulmonary arterial systolic pressure is 35 mmHg.     Signed by: Jo Madison MD on 10/10/2021 11:57 AM      11/11/20    NUCLEAR CARDIAC STRESS TEST 11/11/2020 11/11/2020    Interpretation Summary  · Baseline ECG: Normal sinus rhythm. · Negative stress test.  · Gated SPECT: Left ventricular function post-stress was normal. Calculated ejection fraction is 67%. There is no evidence of transient ischemic dilation (TID). The TID ratio is 0.91.  · Myocardial perfusion imaging supports a low risk stress test.  · Left ventricular perfusion is normal.    Signed by: Rolanda Lott MD on 11/11/2020 12:47 PM    09/24/20    INVASIVE VASCULAR PROCEDURE 09/26/2020 9/26/2020    Narrative  Preoperative diagnosis: Left upper extremity malfunctioning arteriovenous fistula end-stage renal disease    Postoperative diagnosis: At the extremity malfunctioning tube and fistula with end-stage renal disease    Procedures performed:  #1  Left upper extremity fistulagram  #2  Central venogram  #3  Reflux arteriogram      Cultures: None    Specimens: None    Drains: None    Estimated blood loss: Less than 50 mL    Assistants: None    Implants: Please see above    Complications: None    Anesthesia: Moderate conscious sedation of 9 minutes was performed by an independent provider giving the medications per my discretion and monitoring of vital signs throughout the case. Indications for the procedure:  Reynaldo Diaz is a 66 y.o. female with end-stage renal disease who was referred by dialysis center after they had difficulty cannulating her fistula. She states this is only occasionally depends on the tech. She has no other complaints. We discussed performing left lower extremity fistulogram.  Patient was given the appropriate risk and benefits of the procedure including but not limited to bleeding, infection, damage to adjacent structures, MI, stroke, death, loss of lower extremity, need for further surgery. Patient was understanding of all the risks and underwent a procedure.     Operative findings:  #1  No evidence of venous outflow stenosis or arterial inflow stenosis    Procedure:  Patient was correctly identified in the precath area and taken to the Cath Lab in stable condition. Patient had pre-incision timeout prior to any incision. Patient was prepped and draped in the normal sterile fashion according to CDC guidelines aseptic technique. We then anesthetized the left arm with 1% lidocaine and obtain percutaneous access of the left lower extremity arteriovenous fistula. We then performed a fistulogram with central venogram reflux arteriogram.  No evidence of stenosis of the identified. I treated placed in the we advanced a manual wire and a micro-sheath. After completing our fistulogram we remove the micro sheath and held manual pressure for hemostasis. I was present scrubbed and entire procedure all counts were correct. Signed by: Madeleine Garcia MD on 9/26/2020 11:22 AM      XR Results (most recent):  Results from [place] encounter on 11/12/21    XR FEMUR LT 2 V    Narrative  EXAM:  XR FEMUR LT 2 V    INDICATION:   left leg pain after fall    COMPARISON: 11/7/2021 hip radiograph; [age]-year-old radiograph and CT hip    FINDINGS: Frontal and lateral  views of the left femur demonstrate left knee  prosthesis. Enthesopathy and heterotopic bone at the greater trochanter. Soft  tissue vascular calcifications. There is minimal cortical irregularity of the medial acetabulum likely  reflective of high superior pubic ramus fracture on CT. Anterior acetabular  fracture is not well seen. No femur fracture. Impression  No femur fracture.  Left acetabular and pubic ramus fracture better  seen on recent CT scan        Signed By: GINO Capps    November 15, 2021

## 2021-11-15 NOTE — PROGRESS NOTES
Problem: Self Care Deficits Care Plan (Adult)  Goal: *Acute Goals and Plan of Care (Insert Text)  Description: Occupational Therapy Goals  Initiated 11/13/2021 within 7 day(s). 1.  Patient will perform seated functional task with supervision/set-up at EOB for 10 min with stable VS.  2.  Patient will perform bathing with supervision/set-up. 3.  Patient will perform lower body dressing with supervision/set-up and AE prn.  4.  Patient will perform toilet transfers with supervision/set-up. 5.  Patient will perform all aspects of toileting with supervision/set-up. 6.  Patient will perform standing grooming task with SBA for 2  minutes with Fair+ balance with stable VS.  7.  Patient will utilize energy conservation techniques during functional activities with verbal cues. Prior Level of Function: Pt reports being Mod Ind for ADLs and functional mobility using walker or rollator. Pt completed course of OT and PT at home. Pt lives with son available to assist prn. Outcome: Progressing Towards Goal   OCCUPATIONAL THERAPY TREATMENT    Patient: Redd Marie [de-identified]64 y.o. female)  Date: 11/15/2021  Diagnosis: Afib ([address] [I48.91]  Atrial fibrillation ([address] [I48.91]  Hip fracture ([address] [S72.009A]   Atrial fibrillation ([address]       Precautions: Contact, WBAT  PLOF: Mod Ind for ADLs and functional mobility using walker or rollator. Pt completed course of OT and PT at home. Pt lives with son available to assist prn. Chart, occupational therapy assessment, plan of care, and goals were reviewed. ASSESSMENT:  Pt cleared by RN for OT tx at this time. Pt presented supine in bed upon therapist arrival reporting 10/10 headache with nausea, and agreeable to work with OT. Reviewed TTWB restriction on L LE with G adherence throughout tx session. Pt was assisted to EOB from supine with MOD A requiring increased time.  STS transfers CGA with RW x 3 trials while maintaining TTWB on L LE in prep for [facility] transfers tolerating ~ 1 min in stance. Pt requesting to return back to supine 2/2 increased pain. Pt returned back to supine with MOD A and able to scoot towards [facility] by self utilizing SR's. Pt was positioned for comfort and left with L LE elevated on pillow, bed alarm active, HOB elevated, and all needs left within reach. RN made aware of pt's participation and position. Progression toward goals:  []          Improving appropriately and progressing toward goals  [x]          Improving slowly and progressing toward goals  []          Not making progress toward goals and plan of care will be adjusted     PLAN:  Patient continues to benefit from skilled intervention to address the above impairments. Continue treatment per established plan of care. Discharge Recommendations:  Rehab   Further Equipment Recommendations for Discharge:  [facility]     SUBJECTIVE:   Patient stated  I wish this headache would get better. \"     OBJECTIVE DATA SUMMARY:   Cognitive/Behavioral Status:  Neurologic State: Alert  Orientation Level: Oriented X4  Cognition: Follows commands  Safety/Judgement: Awareness of environment, Fall prevention    Functional Mobility and Transfers for ADLs:   Bed Mobility:     Supine to Sit: Moderate assistance  Sit to Supine: Moderate assistance  Scooting: Contact guard assistance   Transfers:  Sit to Stand: Contact guard assistance  Stand to Sit: Contact guard assistance      Balance:  Sitting: Intact  Standing: Impaired; With support  Standing - Static: Good  Standing - Dynamic : Fair      Pain:  Pain level pre-treatment: 10/10 head  Pain level post-treatment: 10/10  Pain Intervention(s): Medication (see MAR); Rest, Ice, Repositioning  Response to intervention: Nurse notified, See doc flow    Activity Tolerance:    Fair   Please refer to the flowsheet for vital signs taken during this treatment.   After treatment:   []  Patient left in no apparent distress sitting up in chair  [x]  Patient left in no apparent distress in bed  [x]  Call bell left within reach  [x]  Nursing notified  []  Caregiver present  [x]  Bed alarm activated    COMMUNICATION/EDUCATION:   [x] Role of Occupational Therapy in the acute care setting  [] Home safety education was provided and the patient/caregiver indicated understanding. [x] Patient/family have participated as able in working towards goals and plan of care. [x] Patient/family agree to work toward stated goals and plan of care. [] Patient understands intent and goals of therapy, but is neutral about his/her participation. [] Patient is unable to participate in goal setting and plan of care.       Thank you for this referral.  JUAQUIN Tate  Time Calculation: 23 mins

## 2021-11-15 NOTE — PROGRESS NOTES
Bedside shift change report given to Lenin Clement (oncoming nurse) by Su Basilio (offgoing nurse). Report included the following information SBAR, Intake/Output, MAR and Recent Results.

## 2021-11-15 NOTE — PROGRESS NOTES
Attempted pt for OT tx at 940. Pt unable to be seen 2/2 SHANON for dialysis. Will continue to f/u as time permits.  Thank you  JUAQUIN Thomas/MELANIE

## 2021-11-16 ENCOUNTER — HOME CARE VISIT (OUTPATIENT)
Dept: SCHEDULING | Facility: HOME HEALTH | Age: 76
End: 2021-11-16
Payer: MEDICARE

## 2021-11-16 ENCOUNTER — HOME CARE VISIT (OUTPATIENT)
Dept: HOME HEALTH SERVICES | Facility: HOME HEALTH | Age: 76
End: 2021-11-16
Payer: MEDICARE

## 2021-11-16 LAB
ANION GAP SERPL CALC-SCNC: 12 MMOL/L (ref 3–18)
ATRIAL RATE: 79 BPM
BASOPHILS # BLD: 0.1 K/UL (ref 0–0.1)
BASOPHILS NFR BLD: 1 % (ref 0–2)
BUN SERPL-MCNC: 32 MG/DL (ref 7–18)
BUN/CREAT SERPL: 6 (ref 12–20)
CALCIUM SERPL-MCNC: 8.8 MG/DL (ref 8.5–10.1)
CALCULATED P AXIS, ECG09: 86 DEGREES
CALCULATED R AXIS, ECG10: -19 DEGREES
CALCULATED T AXIS, ECG11: 54 DEGREES
CHLORIDE SERPL-SCNC: 102 MMOL/L (ref 100–111)
CO2 SERPL-SCNC: 28 MMOL/L (ref 21–32)
CREAT SERPL-MCNC: 5.17 MG/DL (ref 0.6–1.3)
DIAGNOSIS, 93000: NORMAL
DIFFERENTIAL METHOD BLD: ABNORMAL
EOSINOPHIL # BLD: 0.2 K/UL (ref 0–0.4)
EOSINOPHIL NFR BLD: 2 % (ref 0–5)
ERYTHROCYTE [DISTWIDTH] IN BLOOD BY AUTOMATED COUNT: 14.7 % (ref 11.6–14.5)
GLUCOSE SERPL-MCNC: 87 MG/DL (ref 74–99)
HCT VFR BLD AUTO: 31.1 % (ref 35–45)
HGB BLD-MCNC: 9.8 G/DL (ref 12–16)
IMM GRANULOCYTES # BLD AUTO: 0.1 K/UL (ref 0–0.04)
IMM GRANULOCYTES NFR BLD AUTO: 1 % (ref 0–0.5)
LYMPHOCYTES # BLD: 2.1 K/UL (ref 0.9–3.6)
LYMPHOCYTES NFR BLD: 23 % (ref 21–52)
MCH RBC QN AUTO: 30.8 PG (ref 24–34)
MCHC RBC AUTO-ENTMCNC: 31.5 G/DL (ref 31–37)
MCV RBC AUTO: 97.8 FL (ref 78–100)
MONOCYTES # BLD: 0.7 K/UL (ref 0.05–1.2)
MONOCYTES NFR BLD: 8 % (ref 3–10)
NEUTS SEG # BLD: 5.8 K/UL (ref 1.8–8)
NEUTS SEG NFR BLD: 65 % (ref 40–73)
NRBC # BLD: 0 K/UL (ref 0–0.01)
NRBC BLD-RTO: 0 PER 100 WBC
P-R INTERVAL, ECG05: 174 MS
PLATELET # BLD AUTO: 350 K/UL (ref 135–420)
PMV BLD AUTO: 9.5 FL (ref 9.2–11.8)
POTASSIUM SERPL-SCNC: 4.6 MMOL/L (ref 3.5–5.5)
Q-T INTERVAL, ECG07: 390 MS
QRS DURATION, ECG06: 92 MS
QTC CALCULATION (BEZET), ECG08: 447 MS
RBC # BLD AUTO: 3.18 M/UL (ref 4.2–5.3)
SODIUM SERPL-SCNC: 142 MMOL/L (ref 136–145)
VENTRICULAR RATE, ECG03: 79 BPM
WBC # BLD AUTO: 9 K/UL (ref 4.6–13.2)

## 2021-11-16 PROCEDURE — 65270000029 HC RM PRIVATE

## 2021-11-16 PROCEDURE — 97535 SELF CARE MNGMENT TRAINING: CPT

## 2021-11-16 PROCEDURE — 74011250637 HC RX REV CODE- 250/637: Performed by: INTERNAL MEDICINE

## 2021-11-16 PROCEDURE — 93005 ELECTROCARDIOGRAM TRACING: CPT

## 2021-11-16 PROCEDURE — 87086 URINE CULTURE/COLONY COUNT: CPT

## 2021-11-16 PROCEDURE — 99232 SBSQ HOSP IP/OBS MODERATE 35: CPT | Performed by: INTERNAL MEDICINE

## 2021-11-16 PROCEDURE — 85025 COMPLETE CBC W/AUTO DIFF WBC: CPT

## 2021-11-16 PROCEDURE — 74011250636 HC RX REV CODE- 250/636

## 2021-11-16 PROCEDURE — 74011250637 HC RX REV CODE- 250/637: Performed by: STUDENT IN AN ORGANIZED HEALTH CARE EDUCATION/TRAINING PROGRAM

## 2021-11-16 PROCEDURE — 81001 URINALYSIS AUTO W/SCOPE: CPT

## 2021-11-16 PROCEDURE — 2709999900 HC NON-CHARGEABLE SUPPLY

## 2021-11-16 PROCEDURE — 36415 COLL VENOUS BLD VENIPUNCTURE: CPT

## 2021-11-16 PROCEDURE — 87186 SC STD MICRODIL/AGAR DIL: CPT

## 2021-11-16 PROCEDURE — 97530 THERAPEUTIC ACTIVITIES: CPT

## 2021-11-16 PROCEDURE — 87077 CULTURE AEROBIC IDENTIFY: CPT

## 2021-11-16 PROCEDURE — 74011250637 HC RX REV CODE- 250/637

## 2021-11-16 PROCEDURE — 74011000250 HC RX REV CODE- 250

## 2021-11-16 PROCEDURE — 80048 BASIC METABOLIC PNL TOTAL CA: CPT

## 2021-11-16 RX ORDER — FACIAL-BODY WIPES
10 EACH TOPICAL ONCE
Status: COMPLETED | OUTPATIENT
Start: 2021-11-16 | End: 2021-11-16

## 2021-11-16 RX ORDER — ONDANSETRON 2 MG/ML
4 INJECTION INTRAMUSCULAR; INTRAVENOUS ONCE
Status: DISPENSED | OUTPATIENT
Start: 2021-11-16 | End: 2021-11-16

## 2021-11-16 RX ADMIN — Medication 10 ML: at 05:09

## 2021-11-16 RX ADMIN — ACETAMINOPHEN 650 MG: 325 TABLET ORAL at 12:17

## 2021-11-16 RX ADMIN — Medication 10 ML: at 21:04

## 2021-11-16 RX ADMIN — Medication 1 CAPSULE: at 08:55

## 2021-11-16 RX ADMIN — DULOXETINE HYDROCHLORIDE 20 MG: 20 CAPSULE, DELAYED RELEASE ORAL at 08:55

## 2021-11-16 RX ADMIN — MIDODRINE HYDROCHLORIDE 10 MG: 5 TABLET ORAL at 08:55

## 2021-11-16 RX ADMIN — ONDANSETRON 4 MG: 2 INJECTION INTRAMUSCULAR; INTRAVENOUS at 21:03

## 2021-11-16 RX ADMIN — LEVOTHYROXINE SODIUM 125 MCG: 25 TABLET ORAL at 05:09

## 2021-11-16 RX ADMIN — MIDODRINE HYDROCHLORIDE 10 MG: 5 TABLET ORAL at 11:53

## 2021-11-16 RX ADMIN — Medication 10 ML: at 14:56

## 2021-11-16 RX ADMIN — PANTOPRAZOLE SODIUM 20 MG: 20 TABLET, DELAYED RELEASE ORAL at 08:54

## 2021-11-16 RX ADMIN — AMIODARONE HYDROCHLORIDE 200 MG: 200 TABLET ORAL at 08:54

## 2021-11-16 RX ADMIN — HYDROMORPHONE HYDROCHLORIDE 1 MG: 5 SOLUTION ORAL at 08:55

## 2021-11-16 RX ADMIN — MIDODRINE HYDROCHLORIDE 10 MG: 5 TABLET ORAL at 17:56

## 2021-11-16 RX ADMIN — BISACODYL 10 MG: 10 SUPPOSITORY RECTAL at 12:00

## 2021-11-16 RX ADMIN — Medication 1 TABLET: at 08:54

## 2021-11-16 RX ADMIN — ONDANSETRON 4 MG: 2 INJECTION INTRAMUSCULAR; INTRAVENOUS at 08:55

## 2021-11-16 RX ADMIN — ALLOPURINOL 100 MG: 100 TABLET ORAL at 08:54

## 2021-11-16 RX ADMIN — CHOLECALCIFEROL TAB 25 MCG (1000 UNIT) 2000 UNITS: 25 TAB at 08:54

## 2021-11-16 RX ADMIN — HYDROMORPHONE HYDROCHLORIDE 1 MG: 5 SOLUTION ORAL at 20:30

## 2021-11-16 RX ADMIN — NEPHROCAP 1 CAPSULE: 1 CAP ORAL at 08:55

## 2021-11-16 RX ADMIN — APIXABAN 5 MG: 5 TABLET, FILM COATED ORAL at 08:54

## 2021-11-16 RX ADMIN — APIXABAN 5 MG: 5 TABLET, FILM COATED ORAL at 17:56

## 2021-11-16 RX ADMIN — LATANOPROST 1 DROP: 50 SOLUTION OPHTHALMIC at 22:00

## 2021-11-16 RX ADMIN — CYANOCOBALAMIN TAB 1000 MCG 1000 MCG: 1000 TAB at 08:55

## 2021-11-16 RX ADMIN — Medication 500 MG: at 08:54

## 2021-11-16 RX ADMIN — ONDANSETRON 4 MG: 2 INJECTION INTRAMUSCULAR; INTRAVENOUS at 02:51

## 2021-11-16 RX ADMIN — CHOLECALCIFEROL TAB 25 MCG (1000 UNIT) 2000 UNITS: 25 TAB at 17:56

## 2021-11-16 NOTE — PROGRESS NOTES
Intern Progress Note  [facility]       Patient: Bernard Guan MRN: [id number]  CSN: [id number]    YOB: 1945  Age: 64 y.o. Sex: female    DOA: 11/12/2021 LOS:  LOS: 4 days                    SUBJECTIVE    Patient endorses frontal HA and nausea that started soon after dialysis and persists this AM. QTc reviewed and is okay. One time dose of 4mg IV Zofran ordered. She is resting comfortably otherwise. Denies F/C, dizziness, lightheadedness, emesis, SOB, cough, CP.     ROS: Please see above for focused ROS.      OBJECTIVE    Vital Signs:  Patient Vitals for the past 24 hrs:   Temp Pulse Resp BP SpO2   11/16/21 0000 98.5 °F (36.9 °C) 81 18 122/69 95 %   11/15/21 2000 98.4 °F (36.9 °C) 83 18 118/68 96 %   11/15/21 1632 98.1 °F (36.7 °C) 86 18 127/64 95 %   11/15/21 1236 97.8 °F (36.6 °C) 89 18 125/62 --   11/15/21 1226 -- 87 -- 112/73 --   11/15/21 1215 -- (!) 102 -- (!) 131/58 --   11/15/21 1200 -- 70 -- 100/64 --   11/15/21 1145 -- 92 -- 128/66 --   11/15/21 1130 -- 95 -- 123/67 --   11/15/21 1115 -- 92 -- 96/79 --   11/15/21 1100 -- 84 -- 126/70 --   11/15/21 1045 -- 83 -- (!) 110/37 --   11/15/21 1030 -- 80 -- 105/82 --   11/15/21 1015 -- 78 -- (!) 117/58 --   11/15/21 1000 -- 75 -- (!) 89/53 --   11/15/21 0945 -- 74 -- (!) 100/54 --   11/15/21 0930 -- 61 -- (!) 167/95 --   11/15/21 0920 -- 79 -- (!) 110/39 --   11/15/21 0917 97.9 °F (36.6 °C) 81 18 (!) 113/52 --   11/15/21 0831 97.9 °F (36.6 °C) 88 18 100/65 97 %         Intake/Output Summary (Last 24 hours) at 11/16/2021 0823  Last data filed at 11/15/2021 2230  Gross per 24 hour   Intake 250 ml   Output 1700 ml   Net -1450 ml       Lab/Data:  Recent Results (from the past 24 hour(s))   METABOLIC PANEL, BASIC    Collection Time: 11/16/21  2:32 AM   Result Value Ref Range    Sodium 142 136 - 145 mmol/L    Potassium 4.6 3.5 - 5.5 mmol/L    Chloride 102 100 - 111 mmol/L    CO2 28 21 - 32 mmol/L    Anion gap 12 3.0 - 18 mmol/L Glucose 87 74 - 99 mg/dL    BUN 32 (H) 7.0 - 18 MG/DL    Creatinine 5.17 (H) 0.6 - 1.3 MG/DL    BUN/Creatinine ratio 6 (L) 12 - 20      GFR est AA 10 (L) >60 ml/min/1.73m2    GFR est non-AA 8 (L) >60 ml/min/1.73m2    Calcium 8.8 8.5 - 10.1 MG/DL   CBC WITH AUTOMATED DIFF    Collection Time: 11/16/21  2:32 AM   Result Value Ref Range    WBC 9.0 4.6 - 13.2 K/uL    RBC 3.18 (L) 4.20 - 5.30 M/uL    HGB 9.8 (L) 12.0 - 16.0 g/dL    HCT 31.1 (L) 35.0 - 45.0 %    MCV 97.8 78.0 - 100.0 FL    MCH 30.8 24.0 - 34.0 PG    MCHC 31.5 31.0 - 37.0 g/dL    RDW 14.7 (H) 11.6 - 14.5 %    PLATELET 944 149 - 212 K/uL    MPV 9.5 9.2 - 11.8 FL    NRBC 0.0 0  WBC    ABSOLUTE NRBC 0.00 0.00 - 0.01 K/uL    NEUTROPHILS 65 40 - 73 %    LYMPHOCYTES 23 21 - 52 %    MONOCYTES 8 3 - 10 %    EOSINOPHILS 2 0 - 5 %    BASOPHILS 1 0 - 2 %    IMMATURE GRANULOCYTES 1 (H) 0.0 - 0.5 %    ABS. NEUTROPHILS 5.8 1.8 - 8.0 K/UL    ABS. LYMPHOCYTES 2.1 0.9 - 3.6 K/UL    ABS. MONOCYTES 0.7 0.05 - 1.2 K/UL    ABS. EOSINOPHILS 0.2 0.0 - 0.4 K/UL    ABS. BASOPHILS 0.1 0.0 - 0.1 K/UL    ABS. IMM. GRANS. 0.1 (H) 0.00 - 0.04 K/UL    DF AUTOMATED         PHYSICAL EXAM    General: Resting comfortably in hospital bed in NAD. Pleasant, cooperative. Cardiovascular:  RRR, no M/G/R. DP 2+ bilaterally. Respiratory: Normal work of breathing. No intercostal retractions or accessory muscle use. CTAB, no wheezes, rales or rhonchi. Bilateral and symmetric chest rise. Gastrointestinal: soft, NT, ND  MSK: Skin warm and dry. No grossly visible rashes, lesions, or ulcers. No BLE edema. Neurological/Psych:  Alert and oriented to person, time, place, and situation. Normal mood and affect. No focal neurological deficits. ASSESSMENT AND PLAN    64 y. o. female with PMH  A fib,ESRD on HD MWF,  chronic hypotension, DM, chronic back pain, depression now admitted with a fib & L hip/pelvic fracture.     A fib. HR 170s at presentation. S/p 15mg dilt in ED.  HR now in 90s, no episodes of RVR overnight. EKG with a fib. Cardiology consulted, will follow up recs.  On eliquis 5mg BID. Not on any rate controlling meds at home due to chronic hypotension  - Patient currently with stable HR not on any meds. Can admin dilt if HR uncontrolled again.   - FU cards recs  - Continue Eliquis  - Continue tele     L hip/leg pain. CT L Hip: Comminuted fracture involving the left anterior acetabular wall with involvement of the base of the left superior pubic ramus which are not significantly displaced. Nondisplaced left inferior pubic ramus fracture.   - Ortho consulted - non surgical treatment. PT & pain control.   - Pain control: start with home pain regimen - hydromorphone 2 mg tablets -> on dialysis days take 1.5 tablets q12h, on NON dialysis days take 1/2 tablet q12h  - Acute rehab screen placed  - PT/OT     Chronic hypotension. On Midodrine 10 TID.  - Continue home midodrine     ESRD on HD MWF  - Consult nephro for HD while inpatient. - renally dose meds   - avoid nephrotoxic agents     DM   10/8 A1c 4.6. Not on any medications.   - monitor BG on daily BMP     Chronic Back Pain 2/2 DDD  Home regimen: gabapentin 100mg take 2 tablets after dialysis; Hydromorphone 2mg prn - 1.5 tabs on dialysis days, 1/2 tab on non dialysis days.   - continue home pain regimen     Depression  Home med: cymbalta 20mg  - Continue home cymbalta. May need to discuss switching med given ESRD as discharge approaches.     Global Care:  - VS per unit routine  - supplemental O2 for sats < 92%  - incentive spirometer  - Will F/U with CM regarding SNF placement.  - PT/OT/CM     Diet  Adult Diet   DVT Prophylaxis  SCDs   GI Prophylaxis  Pantoprazole   Code status  DNR   Disposition  Telemetry       Point of Contact Son: Justin Raman  [phone]          PFM inpatient team is available 24/7 at [phone] should any further acute changes or concerns arise.      Netta Brown MD , PGY-1   [facility] November 16, 2021, 8:23 AM

## 2021-11-16 NOTE — PROGRESS NOTES
RENAL DAILY PROGRESS NOTE              Subjective:       Complaint:   Feels well  Overnight events noted  no nausea, vomiting, chest pain, short of breath, cough, seizure. IMPRESSION:   IMPRESSION:   · esrd mwf dialysis  · Pelvic fx,non displaced  · A fib  · Chronic hypotension,on midodrine  · Secondary hyperparathyroidism  · Anemia of chronic disease      PLAN:   C/w hectorol and retacrit  HD per schedule. 1.7 L UF done yesterday with HD  Avoid Gadolinium due to its association with nephrogenic systemic fibrosis in a patients with severe ARF and ESRD.    Please dose all medications for creatinine clearance <15/dialysis.    Avoid blood pressure checks, blood draws, peripheral iv's on arm with access. AvoidPICC lines on either arm in order to preserve veins for dialysis access creation.               Current Facility-Administered Medications   Medication Dose Route Frequency    ondansetron (ZOFRAN) injection 4 mg  4 mg IntraVENous ONCE    amiodarone (CORDARONE) tablet 200 mg  200 mg Oral DAILY    epoetin caitlin-epbx (RETACRIT) injection 5,000 Units  5,000 Units SubCUTAneous Q MON, WED & FRI    cholecalciferol (VITAMIN D3) (1000 Units /25 mcg) tablet 2,000 Units  2,000 Units Oral BID    trimethobenzamide (TIGAN) injection 200 mg  200 mg IntraMUSCular Q6H PRN    doxercalciferoL (HECTOROL) 4 mcg/2 mL injection 4 mcg  4 mcg IntraVENous DIALYSIS MON, WED & FRI    sodium chloride (NS) flush 5-40 mL  5-40 mL IntraVENous Q8H    sodium chloride (NS) flush 5-40 mL  5-40 mL IntraVENous PRN    acetaminophen (TYLENOL) tablet 650 mg  650 mg Oral Q6H PRN    Or    acetaminophen (TYLENOL) suppository 650 mg  650 mg Rectal Q6H PRN    polyethylene glycol (MIRALAX) packet 17 g  17 g Oral DAILY PRN    ondansetron (ZOFRAN ODT) tablet 4 mg  4 mg Oral Q8H PRN    Or    ondansetron (ZOFRAN) injection 4 mg  4 mg IntraVENous Q6H PRN    allopurinoL (ZYLOPRIM) tablet 100 mg  100 mg Oral DAILY    ascorbic acid (vitamin C) (VITAMIN C) tablet 500 mg  500 mg Oral DAILY    vitamin B complex-folic acid 0.4 mg tablet  1 Tablet Oral DAILY    biotin chew 1 Tablet (Patient Supplied)  1 Tablet Oral DAILY    cyanocobalamin tablet 1,000 mcg  1,000 mcg Oral DAILY    DULoxetine (CYMBALTA) capsule 20 mg  20 mg Oral DAILY    gabapentin (NEURONTIN) capsule 200 mg  200 mg Oral 3 times weekly    L. acidophilus,casei,rhamnosus (BIO-K PLUS) capsule 1 Capsule  1 Capsule Oral DAILY    latanoprost (XALATAN) 0.005 % ophthalmic solution 1 Drop  1 Drop Both Eyes QHS    levothyroxine (SYNTHROID) tablet 125 mcg  125 mcg Oral ACB    lidocaine 4 % patch 1 Patch  1 Patch TransDERmal DAILY    midodrine (PROAMATINE) tablet 10 mg  10 mg Oral TID WITH MEALS    pantoprazole (PROTONIX) tablet 20 mg  20 mg Oral ACB    B complex-vitaminC-folic acid (NEPHROCAP) cap  1 Capsule Oral DAILY    apixaban (ELIQUIS) tablet 5 mg  5 mg Oral BID    HYDROmorphone (DILAUDID) 1 mg/mL oral solution 1 mg  1 mg Oral Q12H PRN    HYDROmorphone (DILAUDID) tablet 1.5 mg  1.5 mg Oral Q12H PRN       Review of Symptoms: comprehensive ROS negative except above.    Objective:     Patient Vitals for the past 24 hrs:   Temp Pulse Resp BP SpO2   11/16/21 0824 98.1 °F (36.7 °C) 83 20 118/60 95 %   11/16/21 0000 98.5 °F (36.9 °C) 81 18 122/69 95 %   11/15/21 2000 98.4 °F (36.9 °C) 83 18 118/68 96 %   11/15/21 1632 98.1 °F (36.7 °C) 86 18 127/64 95 %   11/15/21 1236 97.8 °F (36.6 °C) 89 18 125/62 --   11/15/21 1226 -- 87 -- 112/73 --   11/15/21 1215 -- (!) 102 -- (!) 131/58 --   11/15/21 1200 -- 70 -- 100/64 --   11/15/21 1145 -- 92 -- 128/66 --   11/15/21 1130 -- 95 -- 123/67 --   11/15/21 1115 -- 92 -- 96/79 --   11/15/21 1100 -- 84 -- 126/70 --   11/15/21 1045 -- 83 -- (!) 110/37 --        Weight change:      11/14 1901 - 11/16 0700  In: 487 [P.O.:477; I.V.:10]  Out: 1700     Intake/Output Summary (Last 24 hours) at 11/16/2021 1039  Last data filed at 11/15/2021 2230  Gross per 24 hour Intake 250 ml   Output 1700 ml   Net -1450 ml     Physical Exam:   General: comfortable, no acute distress   HEENT sclera anicteric, supple neck, no thyromegaly  CVS: S1S2 heard,  no rub  RS: + air entry b/l,   Abd: Soft, Non tender, Not distended, Positive bowel sounds, no organomegaly, no CVA / supra pubic tenderness  Extrm: plus 1 edema, no cyanosis, clubbing   Skin: no visible  Rash  Musculoskeletal: No gross joints or bone deformities         Data Review:     LABS:   Hematology:   Recent Labs     11/16/21  0232 11/15/21  0216 11/14/21  0424   WBC 9.0 8.5 7.2   HGB 9.8* 8.7* 9.3*   HCT 31.1* 28.9* 30.4*     Chemistry:   Recent Labs     11/16/21  0232 11/15/21  0216 11/14/21  0424   BUN 32* 54* 44*   CREA 5.17* 7.54* 5.95*   CA 8.8 7.9* 8.5  8.4*   K 4.6 4.8 4.4    136 139    99* 100   CO2 28 29 30   PHOS  --   --  6.0*   GLU 87 88 127*            Procedures/imaging: see electronic medical records for all procedures, Xrays and details which were not copied into this note but were reviewed prior to creation of Plan          Assessment & Plan:       See above      Sarbjit Caballero MD  11/16/2021

## 2021-11-16 NOTE — ROUTINE PROCESS
Bedside and Verbal shift change [facility] by Hong Raphael RN (offgoing nurse). Report included the following information SBAR, Kardex, Intake/Output, MAR and Recent Results.

## 2021-11-16 NOTE — PROGRESS NOTES
Problem: Mobility Impaired (Adult and Pediatric)  Goal: *Acute Goals and Plan of Care (Insert Text)  Description: Physical Therapy Goals  Initiated 11/13/2021 and to be accomplished within 7 day(s)  1. Patient will move from supine to sit and sit to supine  in bed with independence. 2.  Patient will transfer from bed to chair and chair to bed with modified independence using the least restrictive device. 3.  Patient will perform sit to stand with modified independence. 4.  Patient will ambulate with modified independence for 50 feet with the least restrictive device. 5.  Patient will ascend/descend 2 stairs with no handrail(s) with minimal assistance/contact guard assist.    PLOF: Modified independent with use of RW vs rollator. Pt reports she has been working with home health PT and OT recently. Pt has assist from her son, can stay on the first floor of their home. Outcome: Progressing Towards Goal   PHYSICAL THERAPY TREATMENT    Patient: Shaan Hill [de-identified]64 y.o. female)  Date: 11/16/2021  Diagnosis: Afib ([facility] [address] [I48.91]  Atrial fibrillation ([facility] [address] [I48.91]  Hip fracture ([facility] [address] [S72.009A]   Precautions: Contact, TTWB (LLE)  ASSESSMENT:  Reviewed TTWB LLE s/p left acetabulum and left superior pubic rami fractures. Min A for supine to sit. Seated EOB with good balance. Able to don right sock with min A for dynamic balance. Dependent for left sock. Min A for sit to stand. Maintains TTWB LLE. Amb 3ft with ww, min A, and additional time to bedside commode. Min A for transfers to/from bedside commode. Supervision for balance in sitting to perform ADL tasks. Min A for standing to perform ADLs. Min A for amb 3ft to chair. Seated in chair with BLE elevated. Chair alarm on. Educated on need for RN assistance with mobility; verbalized understanding. Call bell in reach.      Progression toward goals:   [x]      Improving appropriately and progressing toward goals  []      Improving slowly and progressing toward goals  [] Not making progress toward goals and plan of care will be adjusted     PLAN:  Patient continues to benefit from skilled intervention to address the above impairments. Continue treatment per established plan of care. Discharge Recommendations:  Inpatient Rehab  Further Equipment Recommendations for Discharge:  rolling walker     SUBJECTIVE:   Patient stated I need to be able to get to the bathroom.     OBJECTIVE DATA SUMMARY:   Critical Behavior:  Neurologic State: Alert  Orientation Level: Oriented X4  Cognition: Follows commands     Psychosocial  Patient Behaviors: Cooperative  Functional Mobility:  Bed Mobility:  Supine to Sit: Minimum assistance  Transfers:  Sit to Stand: Minimum assistance  Stand to Sit: Minimum assistance  Bed to Chair: Minimum assistance; Additional time  Balance:   Sitting: Impaired  Sitting - Static: Good (unsupported)  Sitting - Dynamic: Fair (occasional)  Standing: Impaired  Standing - Static: Good  Standing - Dynamic : Fair  Ambulation/Gait Training:  Distance (ft):  (3ftx2)   Assistive Device: Walker, rolling  Ambulation - Level of Assistance: Minimal assistance  Left Side Weight Bearing: Toe touch  Neuro Re-Education:  Seated balance 12 minutes  Standing balance 3 minutes  Therapeutic Exercises:   Sit to stand x2    Pain:  Pain level pre-treatment: 4/10  Pain level post-treatment: 6/10     Activity Tolerance:   Good    After treatment:   [x] Patient left in no apparent distress sitting up in chair  [] Patient left in no apparent distress in bed  [x] Call bell left within reach  [x] Nursing notified  [] Caregiver present  [x] Chair alarm activated  [] SCDs applied      COMMUNICATION/EDUCATION:   [x]         Role of physical therapy in the acute care setting. [x]         Fall prevention education was provided and the patient/caregiver indicated understanding. [x]         Patient/family have participated as able in working toward goals and plan of care.   [x]         Patient/family agree to work toward stated goals and plan of care. []         Patient understands intent and goals of therapy, but is neutral about his/her participation. []         Patient is unable to participate in stated goals/plan of care: ongoing with therapy staff.       Antonette Alejandro, PT   Time Calculation: 27 mins

## 2021-11-16 NOTE — PROGRESS NOTES
ARU/IPR REFERRAL CONTACT NOTE  [id number] [place] [address] for Physical Rehabilitation    RE: Brijesh Stern    Referral received to review this patient's case for admission to [id number] [place] [address] for Physical Rehabilitation. Current status reviewed with team and patient meets criteria for admission to [facility] for Physical Rehabilitation pending authorization from [facility]. Case has been opened with [address]; and is pending review. Writer will notify  of status/determination once obtained. Thank you for this referral.  Should you have any questions please do not hesitate to call. Sincerely,  Jose Michele. JOSE ANTONIO Foley  [address] [place] [address] for Physical Rehabilitation  [phone]

## 2021-11-16 NOTE — PROGRESS NOTES
Post-rounding Note  [facility]      OBJECTIVE  Visit Vitals  /60 (BP 1 Location: Right upper arm, BP Patient Position: At rest)   Pulse 83   Temp 98.1 °F (36.7 °C)   Resp 20   Ht 5' 2\" (1.575 m)   Wt 83.8 kg (184 lb 12.3 oz)   SpO2 95%   BMI 33.79 kg/m²       TREATMENT PLAN UPDATES:    - 4S  contacted regarding disposition updates. She states that given PT recommendation for rehab, ARU placement is current disposition goal. She will contact primary inpatient PFM team with any further updates as they become available   - UA w/reflex microscopy pending     See daily progress note for full assessment/plan.      Kayla Cancino MD, PGY-1   [facility]   November 16, 2021, 11:12 AM

## 2021-11-16 NOTE — PROGRESS NOTES
Problem: Self Care Deficits Care Plan (Adult)  Goal: *Acute Goals and Plan of Care (Insert Text)  Description: Occupational Therapy Goals  Initiated 11/13/2021 within 7 day(s). 1.  Patient will perform seated functional task with supervision/set-up at EOB for 10 min with stable VS.  2.  Patient will perform bathing with supervision/set-up. 3.  Patient will perform lower body dressing with supervision/set-up and AE prn.  4.  Patient will perform toilet transfers with supervision/set-up. 5.  Patient will perform all aspects of toileting with supervision/set-up. 6.  Patient will perform standing grooming task with SBA for 2  minutes with Fair+ balance with stable VS.  7.  Patient will utilize energy conservation techniques during functional activities with verbal cues. Prior Level of Function: Pt reports being Mod Ind for ADLs and functional mobility using walker or rollator. Pt completed course of OT and PT at home. Pt lives with son available to assist prn. Outcome: Progressing Towards Goal   OCCUPATIONAL THERAPY TREATMENT    Patient: Meme España [de-identified]64 y.o. female)  Date: 11/16/2021  Diagnosis: Afib ([facility] [address] [I48.91]  Atrial fibrillation ([address] [I48.91]  Hip fracture ([facility] [address] [S72.009A]   Atrial fibrillation (HCC)       Precautions: Contact, TTWB (LLE)  PLOF: Pt reports being Mod Ind for ADLs and functional mobility using walker or rollator. Pt completed course of OT and PT at home. Pt lives with son available to assist prn. Chart, occupational therapy assessment, plan of care, and goals were reviewed. ASSESSMENT:  Pt presented sitting upright in reclining chair upon therapist arrival and agreeable to work with OT. Pt able to recall TTWB on L LE with G maintenance throughout tx session. Pt performed STS transfer MIN A with RW and engaged in simple grooming tasks w/ CGA requiring 1/0 UE support. Pt tolerated ~ 4.5 mins in stance before requiring seated rest break. Pt MOD I brushing her hair @ chair level. She was provided EC/WS techniques to increase safety and (I) during all ADL tasks, pt verbalized understanding. Pt was left with B LISBETH's elevated, chair alarm active, and all needs left within reach. Progression toward goals:  []          Improving appropriately and progressing toward goals  [x]          Improving slowly and progressing toward goals  []          Not making progress toward goals and plan of care will be adjusted     PLAN:  Patient continues to benefit from skilled intervention to address the above impairments. Continue treatment per established plan of care. Discharge Recommendations:  Rehab   Further Equipment Recommendations for Discharge:  PENNIE, [facility]     SUBJECTIVE:   Patient stated  My head feels better today. \"     OBJECTIVE DATA SUMMARY:   Cognitive/Behavioral Status:  Neurologic State: Alert  Orientation Level: Oriented X4  Cognition: Follows commands  Safety/Judgement: Awareness of environment, Fall prevention    Functional Mobility and Transfers for ADLs:      Transfers:  Sit to Stand: Minimum assistance  Stand to Sit: Minimum assistance     Balance:  Sitting: Impaired  Sitting - Static: Good (unsupported)  Sitting - Dynamic: Fair (occasional)  Standing: Impaired  Standing - Static: Good  Standing - Dynamic : Fair    ADL Intervention:       Grooming  Position Performed: Standing  Washing Face: Contact guard assistance  Brushing Teeth: Contact guard assistance  Brushing/Combing Hair: Modified independent (sitting in chair)      Pain:  Pain level pre-treatment: 5/10   Pain level post-treatment: 5/10  Pain Intervention(s): Medication (see MAR); Rest, Repositioning  Response to intervention: Nurse notified, See doc flow    Activity Tolerance:    Fair   Please refer to the flowsheet for vital signs taken during this treatment.   After treatment:   [x]  Patient left in no apparent distress sitting up in chair  []  Patient left in no apparent distress in bed  [x]  Call bell left within reach  [x] Nursing notified  []  Caregiver present  [x]  Chair  alarm activated    COMMUNICATION/EDUCATION:   [x] Role of Occupational Therapy in the acute care setting  [] Home safety education was provided and the patient/caregiver indicated understanding. [x] Patient/family have participated as able in working towards goals and plan of care. [x] Patient/family agree to work toward stated goals and plan of care. [] Patient understands intent and goals of therapy, but is neutral about his/her participation. [] Patient is unable to participate in goal setting and plan of care.       Thank you for this referral.  JUAQUIN Woodall  Time Calculation: 23 mins

## 2021-11-16 NOTE — PROGRESS NOTES
Cardiology Progress Note    Admit Date: 11/12/2021  Attending Cardiologist: Dr. Denise Mar:     1. Paroxysmal Atrial fibrillation with RVR- with elevated rates up to 170's. Patient is symptomatic she reports palpitations and SOB. Intermittent symptoms for the past year, now with final diagnosis. 2. Mechanical fall- with injury. No LOC as cause. 3. Chronic hypotension-on midodrine at home  10 mg po TID  4. Ureteral stricture s/p stent exchange 10/5.  5. Diabetes mellitus. 6. ESRD on HD.  7. Hypothyroidism on synthroid with Abnromal TSH 11/21  8. Chronic anemia. 9. Chronic back pain. 10. Depression. 11. Possible UTI-   12. Left Hip pain- CT showing Comminuted fracture involving the left anterior acetabular wall with involvement of the base of the left superior pubic ramus which are not significantly displaced- conservative management per ortho     Normal EF 55-60% by echo 10/2021. Low risk nuclear stress 11/2020.          Atrial fibrillation CHADSVASC2 score stroke risk:   64 y. C.                                        > 57        +5   female                                         Female +1  CHF hx                                           No    + 0  HTN hx                                           No    + 0  Stroke/TIA/Thromboembolism       No    +0  Vascular disease hx                       No    + 0  Diabetes Mellitus                            Yes    +1   CHADSVASC2 score                    4      Annual Stroke Risk 4.8%- moderate-high         CT pelvis 11/21  1.  Comminuted fracture involving the left anterior acetabular wall with  involvement of the base of the left superior pubic ramus which are not  significantly displaced.    2.  Nondisplaced left inferior pubic ramus fracture.      Primary cardiologist Abrahan Chappell wants to follow with Dr. Anthony Jimenez after discharge      Plan:      Currently NSR   Tolerating Eliquis H&H stable   Continue amiodarone 200 mg po daily  ms on EKG today Unable to tolerate  AV rubina blockers in the setting chronic hypotension. Patient requiring midodrine 10 mg po TID  No clinical evidence fluid overload. Volume status per Nephrology service    Follow UA and C&S     Staff addendum:  Rhythm stable, continue amiodarone, eliquis. BP stable on midodrine. I saw, examined, and evaluated the patient. I personally reviewed the patient's labs, tests, vitals, orders, medications, updated history, and other providers assessments. I personally agree with the findings as stated and the plan as documented. Suzy Carr MD        Subjective:     Complain smelly urine.  No other complains     Objective:      Patient Vitals for the past 8 hrs:   Temp Pulse Resp BP SpO2   11/16/21 0824 98.1 °F (36.7 °C) 83 20 118/60 95 %         Patient Vitals for the past 96 hrs:   Weight   11/15/21 2230 83.8 kg (184 lb 12.3 oz)   11/14/21 0012 83.5 kg (184 lb)   11/12/21 1504 93 kg (205 lb 0.4 oz)       TELE: normal sinus rhythm               Current Facility-Administered Medications   Medication Dose Route Frequency Last Admin    ondansetron (ZOFRAN) injection 4 mg  4 mg IntraVENous ONCE      amiodarone (CORDARONE) tablet 200 mg  200 mg Oral DAILY 200 mg at 11/16/21 0854    epoetin caitlin-epbx (RETACRIT) injection 5,000 Units  5,000 Units SubCUTAneous Q MON, WED & FRI 5,000 Units at 11/15/21 2134    cholecalciferol (VITAMIN D3) (1000 Units /25 mcg) tablet 2,000 Units  2,000 Units Oral BID 2,000 Units at 11/16/21 0854    trimethobenzamide (TIGAN) injection 200 mg  200 mg IntraMUSCular Q6H  mg at 11/13/21 0235    doxercalciferoL (HECTOROL) 4 mcg/2 mL injection 4 mcg  4 mcg IntraVENous DIALYSIS MON, WED & FRI 4 mcg at 11/15/21 0852    sodium chloride (NS) flush 5-40 mL  5-40 mL IntraVENous Q8H 10 mL at 11/16/21 0509    sodium chloride (NS) flush 5-40 mL  5-40 mL IntraVENous PRN      acetaminophen (TYLENOL) tablet 650 mg  650 mg Oral Q6H  mg at 11/15/21 1411    Or    acetaminophen (TYLENOL) suppository 650 mg  650 mg Rectal Q6H PRN      polyethylene glycol (MIRALAX) packet 17 g  17 g Oral DAILY PRN      ondansetron (ZOFRAN ODT) tablet 4 mg  4 mg Oral Q8H PRN      Or    ondansetron (ZOFRAN) injection 4 mg  4 mg IntraVENous Q6H PRN 4 mg at 11/16/21 0855    allopurinoL (ZYLOPRIM) tablet 100 mg  100 mg Oral DAILY 100 mg at 11/16/21 0854    ascorbic acid (vitamin C) (VITAMIN C) tablet 500 mg  500 mg Oral DAILY 500 mg at 11/16/21 0854    vitamin B complex-folic acid 0.4 mg tablet  1 Tablet Oral DAILY 1 Tablet at 11/16/21 0854    biotin chew 1 Tablet (Patient Supplied)  1 Tablet Oral DAILY      cyanocobalamin tablet 1,000 mcg  1,000 mcg Oral DAILY 1,000 mcg at 11/16/21 0855    DULoxetine (CYMBALTA) capsule 20 mg  20 mg Oral DAILY 20 mg at 11/16/21 0855    gabapentin (NEURONTIN) capsule 200 mg  200 mg Oral 3 times weekly 200 mg at 11/15/21 1822    L. acidophilus,casei,rhamnosus (BIO-K PLUS) capsule 1 Capsule  1 Capsule Oral DAILY 1 Capsule at 11/16/21 0855    latanoprost (XALATAN) 0.005 % ophthalmic solution 1 Drop  1 Drop Both Eyes QHS 1 Drop at 11/15/21 2200    levothyroxine (SYNTHROID) tablet 125 mcg  125 mcg Oral  mcg at 11/16/21 0509    lidocaine 4 % patch 1 Patch  1 Patch TransDERmal DAILY 1 Patch at 11/16/21 0905    midodrine (PROAMATINE) tablet 10 mg  10 mg Oral TID WITH MEALS 10 mg at 11/16/21 0855    pantoprazole (PROTONIX) tablet 20 mg  20 mg Oral ACB 20 mg at 11/16/21 0854    B complex-vitaminC-folic acid (NEPHROCAP) cap  1 Capsule Oral DAILY 1 Capsule at 11/16/21 0855    apixaban (ELIQUIS) tablet 5 mg  5 mg Oral BID 5 mg at 11/16/21 0854    HYDROmorphone (DILAUDID) 1 mg/mL oral solution 1 mg  1 mg Oral Q12H PRN 1 mg at 11/16/21 0855    HYDROmorphone (DILAUDID) tablet 1.5 mg  1.5 mg Oral Q12H PRN 1.5 mg at 11/15/21 2133         Intake/Output Summary (Last 24 hours) at 11/16/2021 3215  Last data filed at 11/15/2021 2230  Gross per 24 hour   Intake 250 ml   Output 1700 ml   Net -1450 ml       Physical Exam:  General:  alert, cooperative, no distress, appears stated age  Neck:  no stridor, no carotid bruit, no JVD  Lungs:  clear to auscultation bilaterally  Heart:  regular rate and rhythm, S1, S2 normal, no murmur, click, rub or gallop  Abdomen:  abdomen is soft without significant tenderness, masses, organomegaly or guarding  Extremities:  extremities normal, atraumatic, no cyanosis or edema    Visit Vitals  /60 (BP 1 Location: Right upper arm, BP Patient Position: At rest)   Pulse 83   Temp 98.1 °F (36.7 °C)   Resp 20   Ht 5' 2\" (1.575 m)   Wt 83.8 kg (184 lb 12.3 oz)   SpO2 95%   BMI 33.79 kg/m²       Data Review:     Labs: Results:       Chemistry Recent Labs     11/16/21  0232 11/15/21  0216 11/14/21  0424   GLU 87 88 127*    136 139   K 4.6 4.8 4.4    99* 100   CO2 28 29 30   BUN 32* 54* 44*   CREA 5.17* 7.54* 5.95*   CA 8.8 7.9* 8.5  8.4*   MG  --  2.2 2.2   PHOS  --   --  6.0*   AGAP 12 8 9   BUCR 6* 7* 7*      CBC w/Diff Recent Labs     11/16/21  0232 11/15/21  0216 11/14/21  0424   WBC 9.0 8.5 7.2   RBC 3.18* 2.96* 3.12*   HGB 9.8* 8.7* 9.3*   HCT 31.1* 28.9* 30.4*    313 317   GRANS 65 53 54   LYMPH 23 33 30   EOS 2 3 3      Cardiac Enzymes No results found for: CPK, CK, CKMMB, CKMB, RCK3, CKMBT, CKNDX, CKND1, PATTI, TROPT, TROIQ, GRAHAM, TROPT, TNIPOC, BNP, BNPP   Coagulation No results for input(s): PTP, INR, APTT, INREXT in the last 72 hours. Lipid Panel No results found for: CHOL, CHOLPOCT, CHOLX, CHLST, CHOLV, 880721, HDL, HDLP, LDL, LDLC, DLDLP, 201611, VLDLC, VLDL, TGLX, TRIGL, TRIGP, TGLPOCT, CHHD, CHHDX   BNP No results found for: BNP, BNPP, XBNPT   Liver Enzymes No results for input(s): TP, ALB, TBIL, AP in the last 72 hours.     No lab exists for component: SGOT, GPT, DBIL   Thyroid Studies Lab Results   Component Value Date/Time    TSH 11.40 (H) 11/13/2021 04:45 AM          Signed By: GINO Quintanilla November 16, 2021

## 2021-11-17 LAB
ANION GAP SERPL CALC-SCNC: 10 MMOL/L (ref 3–18)
APPEARANCE UR: ABNORMAL
BACTERIA URNS QL MICRO: ABNORMAL /HPF
BASOPHILS # BLD: 0.1 K/UL (ref 0–0.1)
BASOPHILS NFR BLD: 1 % (ref 0–2)
BILIRUB UR QL: NEGATIVE
BUN SERPL-MCNC: 54 MG/DL (ref 7–18)
BUN/CREAT SERPL: 8 (ref 12–20)
CALCIUM SERPL-MCNC: 8.6 MG/DL (ref 8.5–10.1)
CHLORIDE SERPL-SCNC: 98 MMOL/L (ref 100–111)
CO2 SERPL-SCNC: 28 MMOL/L (ref 21–32)
COLOR UR: ABNORMAL
CREAT SERPL-MCNC: 6.64 MG/DL (ref 0.6–1.3)
DIFFERENTIAL METHOD BLD: ABNORMAL
EOSINOPHIL # BLD: 0.2 K/UL (ref 0–0.4)
EOSINOPHIL NFR BLD: 2 % (ref 0–5)
EPITH CASTS URNS QL MICRO: ABNORMAL /LPF (ref 0–5)
ERYTHROCYTE [DISTWIDTH] IN BLOOD BY AUTOMATED COUNT: 15.2 % (ref 11.6–14.5)
GLUCOSE BLD STRIP.AUTO-MCNC: 136 MG/DL (ref 70–110)
GLUCOSE SERPL-MCNC: 95 MG/DL (ref 74–99)
GLUCOSE UR STRIP.AUTO-MCNC: NEGATIVE MG/DL
HBV SURFACE AG SER QL: <0.1 INDEX
HBV SURFACE AG SER QL: NEGATIVE
HCT VFR BLD AUTO: 30.6 % (ref 35–45)
HGB BLD-MCNC: 9.4 G/DL (ref 12–16)
HGB UR QL STRIP: ABNORMAL
IMM GRANULOCYTES # BLD AUTO: 0.1 K/UL (ref 0–0.04)
IMM GRANULOCYTES NFR BLD AUTO: 2 % (ref 0–0.5)
KETONES UR QL STRIP.AUTO: NEGATIVE MG/DL
LEUKOCYTE ESTERASE UR QL STRIP.AUTO: ABNORMAL
LYMPHOCYTES # BLD: 2.5 K/UL (ref 0.9–3.6)
LYMPHOCYTES NFR BLD: 28 % (ref 21–52)
MCH RBC QN AUTO: 30.2 PG (ref 24–34)
MCHC RBC AUTO-ENTMCNC: 30.7 G/DL (ref 31–37)
MCV RBC AUTO: 98.4 FL (ref 78–100)
MONOCYTES # BLD: 0.9 K/UL (ref 0.05–1.2)
MONOCYTES NFR BLD: 10 % (ref 3–10)
NEUTS SEG # BLD: 5.1 K/UL (ref 1.8–8)
NEUTS SEG NFR BLD: 58 % (ref 40–73)
NITRITE UR QL STRIP.AUTO: NEGATIVE
NRBC # BLD: 0 K/UL (ref 0–0.01)
NRBC BLD-RTO: 0 PER 100 WBC
PH UR STRIP: 7.5 [PH] (ref 5–8)
PLATELET # BLD AUTO: 426 K/UL (ref 135–420)
PMV BLD AUTO: 9.8 FL (ref 9.2–11.8)
POTASSIUM SERPL-SCNC: 4.7 MMOL/L (ref 3.5–5.5)
PROT UR STRIP-MCNC: >1000 MG/DL
RBC # BLD AUTO: 3.11 M/UL (ref 4.2–5.3)
RBC #/AREA URNS HPF: ABNORMAL /HPF (ref 0–5)
SODIUM SERPL-SCNC: 136 MMOL/L (ref 136–145)
SP GR UR REFRACTOMETRY: 1.02 (ref 1–1.03)
UROBILINOGEN UR QL STRIP.AUTO: 0.2 EU/DL (ref 0.2–1)
WBC # BLD AUTO: 8.9 K/UL (ref 4.6–13.2)
WBC URNS QL MICRO: ABNORMAL /HPF (ref 0–4)

## 2021-11-17 PROCEDURE — 97116 GAIT TRAINING THERAPY: CPT

## 2021-11-17 PROCEDURE — 65270000029 HC RM PRIVATE

## 2021-11-17 PROCEDURE — 80048 BASIC METABOLIC PNL TOTAL CA: CPT

## 2021-11-17 PROCEDURE — 2709999900 HC NON-CHARGEABLE SUPPLY

## 2021-11-17 PROCEDURE — 74011000250 HC RX REV CODE- 250

## 2021-11-17 PROCEDURE — 90935 HEMODIALYSIS ONE EVALUATION: CPT

## 2021-11-17 PROCEDURE — 74011250636 HC RX REV CODE- 250/636: Performed by: INTERNAL MEDICINE

## 2021-11-17 PROCEDURE — 85025 COMPLETE CBC W/AUTO DIFF WBC: CPT

## 2021-11-17 PROCEDURE — 82962 GLUCOSE BLOOD TEST: CPT

## 2021-11-17 PROCEDURE — 97530 THERAPEUTIC ACTIVITIES: CPT

## 2021-11-17 PROCEDURE — 86706 HEP B SURFACE ANTIBODY: CPT

## 2021-11-17 PROCEDURE — 74011250637 HC RX REV CODE- 250/637

## 2021-11-17 PROCEDURE — 99232 SBSQ HOSP IP/OBS MODERATE 35: CPT | Performed by: INTERNAL MEDICINE

## 2021-11-17 PROCEDURE — 87340 HEPATITIS B SURFACE AG IA: CPT

## 2021-11-17 PROCEDURE — 74011250637 HC RX REV CODE- 250/637: Performed by: INTERNAL MEDICINE

## 2021-11-17 PROCEDURE — 36415 COLL VENOUS BLD VENIPUNCTURE: CPT

## 2021-11-17 RX ORDER — ALBUMIN HUMAN 250 G/1000ML
SOLUTION INTRAVENOUS
Status: DISPENSED
Start: 2021-11-17 | End: 2021-11-18

## 2021-11-17 RX ADMIN — CHOLECALCIFEROL TAB 25 MCG (1000 UNIT) 2000 UNITS: 25 TAB at 18:52

## 2021-11-17 RX ADMIN — CYANOCOBALAMIN TAB 1000 MCG 1000 MCG: 1000 TAB at 08:21

## 2021-11-17 RX ADMIN — LEVOTHYROXINE SODIUM 125 MCG: 25 TABLET ORAL at 06:20

## 2021-11-17 RX ADMIN — CHOLECALCIFEROL TAB 25 MCG (1000 UNIT) 2000 UNITS: 25 TAB at 08:20

## 2021-11-17 RX ADMIN — DOXERCALCIFEROL 4 MCG: 4 INJECTION, SOLUTION INTRAVENOUS at 08:22

## 2021-11-17 RX ADMIN — APIXABAN 5 MG: 5 TABLET, FILM COATED ORAL at 08:21

## 2021-11-17 RX ADMIN — MIDODRINE HYDROCHLORIDE 10 MG: 5 TABLET ORAL at 11:46

## 2021-11-17 RX ADMIN — Medication 500 MG: at 08:20

## 2021-11-17 RX ADMIN — Medication 10 ML: at 13:07

## 2021-11-17 RX ADMIN — GABAPENTIN 200 MG: 100 CAPSULE ORAL at 18:56

## 2021-11-17 RX ADMIN — Medication 10 ML: at 06:20

## 2021-11-17 RX ADMIN — DULOXETINE HYDROCHLORIDE 20 MG: 20 CAPSULE, DELAYED RELEASE ORAL at 08:20

## 2021-11-17 RX ADMIN — NEPHROCAP 1 CAPSULE: 1 CAP ORAL at 08:20

## 2021-11-17 RX ADMIN — ACETAMINOPHEN 650 MG: 325 TABLET ORAL at 18:52

## 2021-11-17 RX ADMIN — ALLOPURINOL 100 MG: 100 TABLET ORAL at 08:21

## 2021-11-17 RX ADMIN — APIXABAN 5 MG: 5 TABLET, FILM COATED ORAL at 18:52

## 2021-11-17 RX ADMIN — Medication 1 CAPSULE: at 08:21

## 2021-11-17 RX ADMIN — Medication 1 TABLET: at 08:21

## 2021-11-17 RX ADMIN — HYDROMORPHONE HYDROCHLORIDE 1.5 MG: 2 TABLET ORAL at 22:15

## 2021-11-17 RX ADMIN — EPOETIN ALFA-EPBX 5000 UNITS: 10000 INJECTION, SOLUTION INTRAVENOUS; SUBCUTANEOUS at 21:05

## 2021-11-17 RX ADMIN — PANTOPRAZOLE SODIUM 20 MG: 20 TABLET, DELAYED RELEASE ORAL at 08:20

## 2021-11-17 RX ADMIN — MIDODRINE HYDROCHLORIDE 10 MG: 5 TABLET ORAL at 18:52

## 2021-11-17 RX ADMIN — MIDODRINE HYDROCHLORIDE 10 MG: 5 TABLET ORAL at 08:20

## 2021-11-17 RX ADMIN — LATANOPROST 1 DROP: 50 SOLUTION OPHTHALMIC at 21:06

## 2021-11-17 RX ADMIN — AMIODARONE HYDROCHLORIDE 200 MG: 200 TABLET ORAL at 08:21

## 2021-11-17 NOTE — PROGRESS NOTES
Cardiology Progress Note    Admit Date: 11/12/2021  Attending Cardiologist: Dr. Cagle Hands:     1. Paroxysmal Atrial fibrillation, with RVR this admission. Improved s/p IV cardizem. Chadsvasc 4 ( age, F, DM) No outpatient BB d/t hypotension. 2. S/p Mechanical fall. CT with Comminuted fracture involving the left anterior acetabular wall with involvement of the base of the left superior pubic ramus which are not significantly displaced- conservative management per ortho  3. Chronic hypotension, on midodrine 10 mg po TID as outpatient. 4. Ureteral stricture s/p stent exchange 10/5.  5. Diabetes mellitus. 6. ESRD on HD, MWF.   7. Hypothyroidism on synthroid with Abnromal TSH 11/21  8. Chronic anemia. 9. Chronic back pain. 10. Low risk NST 10/2020  11. ECHO EF 55-60%.        Primary cardiologist: Yee Ellison wants to follow with Dr. Cindy Calero after discharge      Plan:      -Fluid mgmt per renal team, planned HD session today.   -Currently in SR with occasional PACs on tele. No BB d/t hypotension. Continued on amiodarone, Eliquis. H/H stable. -Continued on midodrine.    -No further recommendations from CV standpoint.   -Will plan for patient to follow up with Dr. Cindy Calero in the office in 6 weeks post discharge. Subjective:     No new complaints this am. Denies CP or SOB.      Objective:      Patient Vitals for the past 8 hrs:   Temp Pulse Resp BP SpO2   11/17/21 0838 98 °F (36.7 °C) 82 16 119/64 98 %         Patient Vitals for the past 96 hrs:   Weight   11/15/21 2230 83.8 kg (184 lb 12.3 oz)   11/14/21 0012 83.5 kg (184 lb)       TELE: normal sinus rhythm               Current Facility-Administered Medications   Medication Dose Route Frequency Last Admin    amiodarone (CORDARONE) tablet 200 mg  200 mg Oral DAILY 200 mg at 11/17/21 0821    epoetin caitlin-epbx (RETACRIT) injection 5,000 Units  5,000 Units SubCUTAneous Q MON, WED & FRI 5,000 Units at 11/15/21 2134    cholecalciferol (VITAMIN D3) (1000 Units /25 mcg) tablet 2,000 Units  2,000 Units Oral BID 2,000 Units at 11/17/21 0820    trimethobenzamide (TIGAN) injection 200 mg  200 mg IntraMUSCular Q6H  mg at 11/13/21 0235    doxercalciferoL (HECTOROL) 4 mcg/2 mL injection 4 mcg  4 mcg IntraVENous DIALYSIS MON, WED & FRI 4 mcg at 11/17/21 4719    sodium chloride (NS) flush 5-40 mL  5-40 mL IntraVENous Q8H 10 mL at 11/17/21 1916    sodium chloride (NS) flush 5-40 mL  5-40 mL IntraVENous PRN      acetaminophen (TYLENOL) tablet 650 mg  650 mg Oral Q6H  mg at 11/16/21 1217    Or    acetaminophen (TYLENOL) suppository 650 mg  650 mg Rectal Q6H PRN      polyethylene glycol (MIRALAX) packet 17 g  17 g Oral DAILY PRN      ondansetron (ZOFRAN ODT) tablet 4 mg  4 mg Oral Q8H PRN      Or    ondansetron (ZOFRAN) injection 4 mg  4 mg IntraVENous Q6H PRN 4 mg at 11/16/21 2103    allopurinoL (ZYLOPRIM) tablet 100 mg  100 mg Oral DAILY 100 mg at 11/17/21 9532    ascorbic acid (vitamin C) (VITAMIN C) tablet 500 mg  500 mg Oral DAILY 500 mg at 11/17/21 0820    vitamin B complex-folic acid 0.4 mg tablet  1 Tablet Oral DAILY 1 Tablet at 11/17/21 3710    biotin chew 1 Tablet (Patient Supplied)  1 Tablet Oral DAILY      cyanocobalamin tablet 1,000 mcg  1,000 mcg Oral DAILY 1,000 mcg at 11/17/21 5768    DULoxetine (CYMBALTA) capsule 20 mg  20 mg Oral DAILY 20 mg at 11/17/21 0820    gabapentin (NEURONTIN) capsule 200 mg  200 mg Oral 3 times weekly 200 mg at 11/15/21 1822    L. acidophilus,casei,rhamnosus (BIO-K PLUS) capsule 1 Capsule  1 Capsule Oral DAILY 1 Capsule at 11/17/21 0821    latanoprost (XALATAN) 0.005 % ophthalmic solution 1 Drop  1 Drop Both Eyes QHS 1 Drop at 11/16/21 2200    levothyroxine (SYNTHROID) tablet 125 mcg  125 mcg Oral  mcg at 11/17/21 5249    lidocaine 4 % patch 1 Patch  1 Patch TransDERmal DAILY 1 Patch at 11/17/21 0823    midodrine (PROAMATINE) tablet 10 mg  10 mg Oral TID WITH MEALS 10 mg at 11/17/21 6101  pantoprazole (PROTONIX) tablet 20 mg  20 mg Oral ACB 20 mg at 11/17/21 0820    B complex-vitaminC-folic acid (NEPHROCAP) cap  1 Capsule Oral DAILY 1 Capsule at 11/17/21 0820    apixaban (ELIQUIS) tablet 5 mg  5 mg Oral BID 5 mg at 11/17/21 7206    HYDROmorphone (DILAUDID) 1 mg/mL oral solution 1 mg  1 mg Oral Q12H PRN 1 mg at 11/16/21 2030    HYDROmorphone (DILAUDID) tablet 1.5 mg  1.5 mg Oral Q12H PRN 1.5 mg at 11/15/21 2133         Intake/Output Summary (Last 24 hours) at 11/17/2021 5084  Last data filed at 11/16/2021 2130  Gross per 24 hour   Intake 250 ml   Output 2 ml   Net 248 ml       Physical Exam:  General:  alert, cooperative, no distress, appears stated age  Neck:  no stridor, no carotid bruit, no JVD  Lungs:  clear to auscultation bilaterally  Heart:  regular rate and rhythm, S1, S2 normal, no murmur, click, rub or gallop  Abdomen:  abdomen is soft without significant tenderness, masses, organomegaly or guarding  Extremities:  extremities normal, atraumatic, no cyanosis or edema    Visit Vitals  /64   Pulse 82   Temp 98 °F (36.7 °C)   Resp 16   Ht 5' 2\" (1.575 m)   Wt 83.8 kg (184 lb 12.3 oz)   SpO2 98%   BMI 33.79 kg/m²       Data Review:     Labs: Results:       Chemistry Recent Labs     11/17/21  0145 11/16/21  0232 11/15/21  0216   GLU 95 87 88    142 136   K 4.7 4.6 4.8   CL 98* 102 99*   CO2 28 28 29   BUN 54* 32* 54*   CREA 6.64* 5.17* 7.54*   CA 8.6 8.8 7.9*   MG  --   --  2.2   AGAP 10 12 8   BUCR 8* 6* 7*      CBC w/Diff Recent Labs     11/17/21  0145 11/16/21  0232 11/15/21  0216   WBC 8.9 9.0 8.5   RBC 3.11* 3.18* 2.96*   HGB 9.4* 9.8* 8.7*   HCT 30.6* 31.1* 28.9*   * 350 313   GRANS 58 65 53   LYMPH 28 23 33   EOS 2 2 3      Cardiac Enzymes No results found for: CPK, CK, CKMMB, CKMB, RCK3, CKMBT, CKNDX, CKND1, PATTI, TROPT, TROIQ, GRAHAM, TROPT, TNIPOC, BNP, BNPP   Coagulation No results for input(s): PTP, INR, APTT, INREXT, INREXT in the last 72 hours.     Lipid Panel No results found for: CHOL, CHOLPOCT, CHOLX, CHLST, CHOLV, 119299, HDL, HDLP, LDL, LDLC, DLDLP, 691260, VLDLC, VLDL, TGLX, TRIGL, TRIGP, TGLPOCT, CHHD, CHHDX   BNP No results found for: BNP, BNPP, XBNPT   Liver Enzymes No results for input(s): TP, ALB, TBIL, AP in the last 72 hours.     No lab exists for component: SGOT, GPT, DBIL   Thyroid Studies Lab Results   Component Value Date/Time    TSH 11.40 (H) 11/13/2021 04:45 AM          Signed By: JOEL Da Silva    November 17, 2021

## 2021-11-17 NOTE — PROGRESS NOTES
Problem: Pressure Injury - Risk of  Goal: *Prevention of pressure injury  Description: Document Giovany Scale and appropriate interventions in the flowsheet. Outcome: Progressing Towards Goal  Note: Pressure Injury Interventions:  Sensory Interventions: Assess changes in LOC, Keep linens dry and wrinkle-free, Maintain/enhance activity level, Minimize linen layers, Check visual cues for pain    Moisture Interventions: Absorbent underpads    Activity Interventions: Pressure redistribution bed/mattress(bed type)    Mobility Interventions: HOB 30 degrees or less, Pressure redistribution bed/mattress (bed type)    Nutrition Interventions: Document food/fluid/supplement intake       Problem: Falls - Risk of  Goal: *Absence of Falls  Description: Document Freddy Fall Risk and appropriate interventions in the flowsheet.   Outcome: Progressing Towards Goal  Note: Fall Risk Interventions:  Mobility Interventions: Bed/chair exit alarm, Patient to call before getting OOB    Medication Interventions: Bed/chair exit alarm, Patient to call before getting OOB, Teach patient to arise slowly    Elimination Interventions: Call light in reach, Bed/chair exit alarm, Patient to call for help with toileting needs    History of Falls Interventions: Bed/chair exit alarm, Investigate reason for fall

## 2021-11-17 NOTE — DIALYSIS
ACUTE HEMODIALYSIS TREATMENT    HEMODIALYSIS ORDERS: Physician: Dr. Angeles Rojo     Dialyzer: Revaclear   Duration: 3 hr   BFR: 400   DFR: 800   Dialysate:  Temp 36-37*C   K+  2    Ca+ 2.5   Na 138   Bicarb 35   Wt Readings from Last 1 Encounters:   11/15/21 83.8 kg (184 lb 12.3 oz)    Patient Chart [x]   Unable to Obtain []  Dry weight/UF Goal: 2000 ml    Heparin []  Bolus    Units    [] Hourly    Units    [x]None       Pre BP:   107/58   Pulse:  68   Respirations: 18   Temp:  98.1  [] Oral [] Axillary [] Esophageal   Labs: []  Pre  []  Post:   [x] N/A   Additional Orders(medications, blood products, hypotension management): [] Yes   [] No     [x]  [facility] Consent Verified     CATHETER ACCESS:  [x]N/A   []Right   []Left   []IJ   []Fem  []Chest wall  []TransHepatic   [] First use X-ray verified     []Tunnel    [] Non Tunneled   []No S/S infection  []Redness  []Drainage []Cultured []Swelling []Pain   []Medical Aseptic Prep Utilized   []Dressing Changed  [x] Biopatch  Date:    []Clotted   [x]Patent   Flows: [x]Good  []Poor  []Reversed   If access problem,  notified: []Yes    [x]N/A        GRAFT/FISTULA ACCESS:   []N/A     []Right     [x]Left     [x]UE     []LE   []AVG   []AVF       [x]Medical Aseptic Prep Utilized   [x]No S/S infection  []Redness  []Drainage [] Cultured  [] Swelling  [] Pain  Bruit:   [x] Strong    [] Weak       Thrill :   [x] Strong    [] Weak     Needle Gauge: 15   Length: 1 inch   If access problem,  notified: []Yes     [x]N/A          GENERAL ASSESSMENT:    LUNGS:  Resp Rate 18   [x] Clear  [] Coarse  [] Crackles  [] Wheezing  [] Diminished         Respirations:  [x]Easy  []Labored  []N/A  Cough:  []Productive  []Dry  []N/A      Therapy:  []RA  O2 Type:  []NC  Mask: []  NRB    [] BiPaP  Flow:  l/min                   [] Ventilated  [] Intubated  [] Trach     CARDIAC: [x] Regular      [] Irregular   [] Rhythm:          [] Monitored   [] Bedside   [] Remotely monitored     EDEMA: [] None [x]Generalized  [] Pitting [] 1+   [] 2 +   [] 3+    [] 4+  [] Pedal    SKIN:   [x] Warm  [] Hot     [] Cold   [x] Dry     [] Pale   [] Diaphoretic                  [] Flushed  [] Jaundiced  [] Cyanotic     LOC:    [x] Alert      [x]Oriented:    [x] Person     [x] Place  []Time               [] Confused  [] Lethargic  [] Medicated  [] Non-responsive  [] Non Verbal   GI / ABDOMEN:                     [] Flat    [] Distended    [x] Soft    [] Firm   []  Obese                   [] Diarrhea   [] FMS [x] Bowel Sounds  [] Nausea  [] Vomiting                   [] NGT  [] OGT    [] PEG  [] Tube Feedings @     / URINE ASSESSMENT:                   [] Voiding  []  Cline  [x] Oliguria  [] Anuria                     [] Incontinent  []  Incontinent Brief   []  PureWick     PAIN:  [x] 0 []1  []2   []3   []4   []5   []6   []7   []8   []9   []10                MOBILITY:  [x] Bed    [] Stretcher      All Vitals and Treatment Details on Attached [address]: [facility]          Room # 518/64    [x] Routine         [] 1st Time Acute/Chronic   [] Urgent      [] Stat            [x] Acute Room   []  Bedside    [] ICU/CCU     [] ER   Isolation Precautions:  [x] Dialysis    Contact     ALLERGIES:     Allergies   Allergen Reactions    Ciprofloxacin Hives    Cyclopentolate Unknown (comments)    Iron Sucrose Diarrhea    Statins-Hmg-Coa Reductase Inhibitors Other (comments)     Body ache      Code Status:  DNR     Hepatitis Status      No results found for: HAMAT, HAAB, HABT, HAAT, HBSAG, HBSB, HBSAT, HBABN, HBCM, HBCAB, HBCAT, XBCABS, HBEAB, HBEAG, XHEPCS, [id number], HBEGLT, HBCMLT, HBCLT, HBEBLT, [id number], [id number], HAVMLT, [id number], HBCMLT, [id number], HCGAT     Current Labs:      Lab Results   Component Value Date/Time    WBC 8.9 11/17/2021 01:45 AM    Hemoglobin, POC 8.8 (L) 09/24/2020 08:45 AM    HGB 9.4 (L) 11/17/2021 01:45 AM    Hematocrit, POC 26 (L) 09/24/2020 08:45 AM    HCT 30.6 (L) 11/17/2021 01:45 AM    PLATELET 482 (H) 11/17/2021 01:45 AM    MCV 98.4 11/17/2021 01:45 AM     Lab Results   Component Value Date/Time    Sodium 136 11/17/2021 01:45 AM    Potassium 4.7 11/17/2021 01:45 AM    Chloride 98 (L) 11/17/2021 01:45 AM    CO2 28 11/17/2021 01:45 AM    Anion gap 10 11/17/2021 01:45 AM    Glucose 95 11/17/2021 01:45 AM    BUN 54 (H) 11/17/2021 01:45 AM    Creatinine 6.64 (H) 11/17/2021 01:45 AM    BUN/Creatinine ratio 8 (L) 11/17/2021 01:45 AM    GFR est AA 7 (L) 11/17/2021 01:45 AM    GFR est non-AA 6 (L) 11/17/2021 01:45 AM    Calcium 8.6 11/17/2021 01:45 AM          DIET:  DIET ADULT     PRIMARY NURSE REPORT:   Pre Dialysis: Jairo Pope RN    Time: 1561      EDUCATION:    [x] Patient           Knowledge Basis: [x]None []Minimal [] Substantial [] Unknown  Barriers to learning  [x]N/A  [] Intubated/Trached/Ventilated  [] Sedated/Paralyzed   [] Access Care     [] S&S of infection  [] Fluid Management  [] K+   [x] Procedural    [] Medications   [] Tx Options   [] Transplant   [] Diet      Teaching Tools:  [x] Explain  [] Demo  [] Handouts [] Video  Patient response: [] Verbalized understanding   [x] Requires follow up        [x] Time Out/Safety Check    [x] Extracorporeal Circuit Tested for integrity       [address] - Before each treatment:        [facility]                                    [x] Unit Machine # 9 with centralized RO                                    [] Portable Machine #1/RO serial # [id number]                                  [] Portable Machine #2/RO serial # [id number]                                  [] Portable Machine #4/RO serial # [id number]                                  [] Portable Machine #10/RO serial #  [id number]                                                                                                         Alarm Test:  Pass time 1430            [x] RO/Machine Log Complete    Machine Temp    36-37*C             Dialysate: pH 7.4    Conductivity: Meter 13.9   HD Machine  13.8     TCD: 13.9  Dialyzer Lot # [id number]     Blood Tubing Lot # [id number]     Saline Lot # [id number]     CHLORINE TESTING-Before each treatment and every 4 hours    Total Chlorine: [x] less than 0.1 ppm  Initial Time Check: 1300       4 Hr/2nd Check Time: 1700   (if greater than 0.1 ppm from Primary then every 30 minutes from Secondary)     TREATMENT INITIATION - with Dialysis Precautions:   [x] All Connections Secured              [x] Saline Line Double Clamped   [x] Venous Parameters Set               [x] Arterial Parameters Set    [x] Prime Given 250ml NSS              [x]Air Foam Detector Engaged      Treatment Initiation Note:  Received Patient in the unit awake and alert, assessed and stable for treatment. LUE AVF Accessed with no signs or symptoms of complication. Treatment initiated by Oumou Diamond     During Treatment Notes:  3621  Vascular access visible with arterial and venous line connections intact. Pt resting comfortably. 1445  Vascular access visible with arterial and venous line connections intact. Pt resting comfortably. 1545  Vascular access visible with arterial and venous line connections intact. Pt resting comfortably. 1600  Vascular access visible with arterial and venous line connections intact. Pt resting comfortably. 1615  Vascular access visible with arterial and venous line connections intact. Pt resting comfortably. 1630  Vascular access visible with arterial and venous line connections intact. Pt resting comfortably. 1645  Vascular access visible with arterial and venous line connections intact. Pt resting comfortably. 1700  Vascular access visible with arterial and venous line connections intact. Pt resting comfortably. 1715  Vascular access visible with arterial and venous line connections intact. Pt resting comfortably. 1730  Dialysis treatment complete.        Medication Dose Volume Route Time Dat Nurse, Title      HD  JOSE ANTONIO Reyna Damien RN      HD  Chase Murrell RN     Post Assessment  Dialyzer Cleared:   [x] Good  [] Fair  [] Poor  Blood processed:  68.5 L  UF Removed:  2000 Ml    Post /56   Pulse  72 Resp  20  Temp 97.9 Lungs: [x] Clear [] Course  [] Crackles                 []  Wheezing   [] Diminished   Post Tx Vascular Access: [] N/A  AVF/AVG: Bleeding stopped with  Arterial Pressure for 10 min   Venous Pressure for 10 min      Cardiac :[x] Regular   [] Irregular   Rhythm:  [] Monitored   [] Not Monitored    CVC Catheter: [x] N/A  Locking solution: Heparin 1:1000 U  Arterial port  ml   Venous port  ml   Edema:  [] None  [x] Generalized                     Skin:[x] Warm  [x] Dry [] Diaphoretic               [] Flushed  [] Pale [] Cyanotic Pain:  [x]0  []1 []2  []3 []4  []5  []6  []7 []8    []9  []10     Post Treatment Note:    Patient completed and  Tolerated 3 hrs of hemo dialysis. 2000 ml of fluid remove. Arterial and venous needles removed and pressure applied until bleeding stopped. Dry dressings applied. Bruit/Thrill present above and below dressings.       POST TREATMENT PRIMARY NURSE HANDOFF REPORT:   Post Dialysis: Sergo Gonzalez RN               Time:  4791       Abbreviations: AVG-arterial venous graft, AVF-arterial venous fistula, IJ-Internal Jugular, Subcl-Subclavian, Fem-Femoral, Tx-treatment, AP/HR-apical heart rate, VSS- Vital Signs Stable, CVC- Central Venous Catheter, DFR-dialysate flow rate, BFR-blood flow rate, AP-arterial pressure, -venous pressure, UF-ultrafiltrate, TMP-transmembrane pressure, [place]-Venous, Art-Arterial, RO-Reverse Osmosis

## 2021-11-17 NOTE — PROGRESS NOTES
Problem: Mobility Impaired (Adult and Pediatric)  Goal: *Acute Goals and Plan of Care (Insert Text)  Description: Physical Therapy Goals  Initiated 11/13/2021 and to be accomplished within 7 day(s)  1. Patient will move from supine to sit and sit to supine  in bed with independence. 2.  Patient will transfer from bed to chair and chair to bed with modified independence using the least restrictive device. 3.  Patient will perform sit to stand with modified independence. 4.  Patient will ambulate with modified independence for 50 feet with the least restrictive device. 5.  Patient will ascend/descend 2 stairs with no handrail(s) with minimal assistance/contact guard assist.    PLOF: Modified independent with use of RW vs rollator. Pt reports she has been working with home health PT and OT recently. Pt has assist from her son, can stay on the first floor of their home. Outcome: Progressing Towards Goal     PHYSICAL THERAPY TREATMENT    Patient: Shaan Hill [de-identified]64 y.o. female)  Date: 11/17/2021  Diagnosis: Afib ([facility] [address] [I48.91]  Atrial fibrillation ([address] [I48.91]  Hip fracture ([facility] [address] [S72.009A]   Atrial fibrillation (HCC)       Precautions: Contact, TTWB (LLE)  PLOF: see above     ASSESSMENT:  Pt seen in bed in NAD and agreeable. Pt is willing to ambulate in room but declines recliner 2/2 reporting she is leaving for dialysis shortly. Pt is able to perform supine <> sit without physical assist this date with additional time required for completion. Pt stands to RW with min A and given reminder of TTWB on the LLE. Pt amb approx 15 ft in [place] next to bed with CGA for safety and steadying, pt with antalgic gait pattern and decreased R stride length with safe management of RW noted. At end of session, pt is positioned for comfort with all needs met, pt remains good candidate for rehab/ARU and will continue to follow per POC.    Progression toward goals:   [x]      Improving appropriately and progressing toward goals  []      Improving slowly and progressing toward goals  []      Not making progress toward goals and plan of care will be adjusted     PLAN:  Patient continues to benefit from skilled intervention to address the above impairments. Continue treatment per established plan of care. Discharge Recommendations:  Rehab  Further Equipment Recommendations for Discharge:  TBD at next level of care     SUBJECTIVE:   Patient stated I will walk, I have dialysis soon.     OBJECTIVE DATA SUMMARY:   Critical Behavior:  Neurologic State: Alert  Orientation Level: Oriented X4  Cognition: Follows commands  Safety/Judgement: Awareness of environment, Fall prevention  Functional Mobility Training:  Bed Mobility:     Supine to Sit: Stand-by assistance; Additional time; Bed Modified  Sit to Supine: Stand-by assistance; Additional time; Bed Modified            Transfers:  Sit to Stand: Minimum assistance  Stand to Sit: Contact guard assistance       Balance:  Sitting: Intact; With support  Sitting - Static: Good (unsupported)  Sitting - Dynamic: Good (unsupported)  Standing: Impaired  Standing - Static: Good  Standing - Dynamic : Fair    Ambulation/Gait Training:  Distance (ft): 15 Feet (ft)  Assistive Device: Walker, rolling  Ambulation - Level of Assistance: Contact guard assistance        Gait Abnormalities: Decreased step clearance; Antalgic     Left Side Weight Bearing: Toe touch  Base of Support: Center of gravity altered  Stance: Left decreased  Speed/Marylou: Slow  Step Length: Right shortened    Pain:  Pain level pre-treatment: does not rate, LLE with movement /10  Pain level post-treatment: \"    \" /10   Pain Intervention(s): Medication (see MAR); Rest, Ice, Repositioning   Response to intervention: Nurse notified    Activity Tolerance:   Good    Please refer to the flowsheet for vital signs taken during this treatment.   After treatment:   [] Patient left in no apparent distress sitting up in chair  [x] Patient left in no apparent distress in bed  [x] Call bell left within reach  [x] Nursing notified  [] Caregiver present  [x] Bed alarm activated  [] SCDs applied      COMMUNICATION/EDUCATION:   [x]         Role of Physical Therapy in the acute care setting. [x]         Fall prevention education was provided and the patient/caregiver indicated understanding. [x]         Patient/family have participated as able in working toward goals and plan of care. [x]         Patient/family agree to work toward stated goals and plan of care. []         Patient understands intent and goals of therapy, but is neutral about his/her participation.   []         Patient is unable to participate in stated goals/plan of care: ongoing with therapy staff.  []         Other:        Kostas Cowan   Time Calculation: 25 mins

## 2021-11-17 NOTE — PROGRESS NOTES
RENAL DAILY PROGRESS NOTE              Subjective:       Complaint:   Feels well  Overnight events noted  no nausea, vomiting, chest pain, short of breath, cough, seizure. IMPRESSION:   IMPRESSION:   · esrd mwf dialysis  · Pelvic fx,non displaced  · A fib  · Chronic hypotension,on midodrine  · Secondary hyperparathyroidism  · Anemia of chronic disease      PLAN:   C/w hectorol and retacrit  HD per schedule. Avoid Gadolinium due to its association with nephrogenic systemic fibrosis in a patients with severe ARF and ESRD.    Please dose all medications for creatinine clearance <15/dialysis.    Avoid blood pressure checks, blood draws, peripheral iv's on arm with access. AvoidPICC lines on either arm in order to preserve veins for dialysis access creation.               Current Facility-Administered Medications   Medication Dose Route Frequency    amiodarone (CORDARONE) tablet 200 mg  200 mg Oral DAILY    epoetin caitlin-epbx (RETACRIT) injection 5,000 Units  5,000 Units SubCUTAneous Q MON, WED & FRI    cholecalciferol (VITAMIN D3) (1000 Units /25 mcg) tablet 2,000 Units  2,000 Units Oral BID    trimethobenzamide (TIGAN) injection 200 mg  200 mg IntraMUSCular Q6H PRN    doxercalciferoL (HECTOROL) 4 mcg/2 mL injection 4 mcg  4 mcg IntraVENous DIALYSIS MON, WED & FRI    sodium chloride (NS) flush 5-40 mL  5-40 mL IntraVENous Q8H    sodium chloride (NS) flush 5-40 mL  5-40 mL IntraVENous PRN    acetaminophen (TYLENOL) tablet 650 mg  650 mg Oral Q6H PRN    Or    acetaminophen (TYLENOL) suppository 650 mg  650 mg Rectal Q6H PRN    polyethylene glycol (MIRALAX) packet 17 g  17 g Oral DAILY PRN    ondansetron (ZOFRAN ODT) tablet 4 mg  4 mg Oral Q8H PRN    Or    ondansetron (ZOFRAN) injection 4 mg  4 mg IntraVENous Q6H PRN    allopurinoL (ZYLOPRIM) tablet 100 mg  100 mg Oral DAILY    ascorbic acid (vitamin C) (VITAMIN C) tablet 500 mg  500 mg Oral DAILY    vitamin B complex-folic acid 0.4 mg tablet 1 Tablet Oral DAILY    biotin chew 1 Tablet (Patient Supplied)  1 Tablet Oral DAILY    cyanocobalamin tablet 1,000 mcg  1,000 mcg Oral DAILY    DULoxetine (CYMBALTA) capsule 20 mg  20 mg Oral DAILY    gabapentin (NEURONTIN) capsule 200 mg  200 mg Oral 3 times weekly    L. acidophilus,casei,rhamnosus (BIO-K PLUS) capsule 1 Capsule  1 Capsule Oral DAILY    latanoprost (XALATAN) 0.005 % ophthalmic solution 1 Drop  1 Drop Both Eyes QHS    levothyroxine (SYNTHROID) tablet 125 mcg  125 mcg Oral ACB    lidocaine 4 % patch 1 Patch  1 Patch TransDERmal DAILY    midodrine (PROAMATINE) tablet 10 mg  10 mg Oral TID WITH MEALS    pantoprazole (PROTONIX) tablet 20 mg  20 mg Oral ACB    B complex-vitaminC-folic acid (NEPHROCAP) cap  1 Capsule Oral DAILY    apixaban (ELIQUIS) tablet 5 mg  5 mg Oral BID    HYDROmorphone (DILAUDID) 1 mg/mL oral solution 1 mg  1 mg Oral Q12H PRN    HYDROmorphone (DILAUDID) tablet 1.5 mg  1.5 mg Oral Q12H PRN       Review of Symptoms: comprehensive ROS negative except above.    Objective:     Patient Vitals for the past 24 hrs:   Temp Pulse Resp BP SpO2   11/17/21 0838 98 °F (36.7 °C) 82 16 119/64 98 %   11/16/21 1601 98.6 °F (37 °C) 72 18 121/60 95 %   11/16/21 1158 98.5 °F (36.9 °C) 81 18 108/62 97 %        Weight change:      11/15 1901 - 11/17 0700  In: 500 [P.O.:480; I.V.:20]  Out: 2     Intake/Output Summary (Last 24 hours) at 11/17/2021 1145  Last data filed at 11/17/2021 4821  Gross per 24 hour   Intake 490 ml   Output 2 ml   Net 488 ml     Physical Exam:   General: comfortable, no acute distress   HEENT sclera anicteric, supple neck, no thyromegaly  CVS: S1S2 heard,  no rub  RS: + air entry b/l,   Abd: Soft, Non tender, Not distended, Positive bowel sounds, no organomegaly, no CVA / supra pubic tenderness  Extrm: plus 1 edema, no cyanosis, clubbing   Skin: no visible  Rash  Musculoskeletal: No gross joints or bone deformities         Data Review:     LABS:   Hematology:   Recent Labs     11/17/21 0145 11/16/21 0232 11/15/21  0216   WBC 8.9 9.0 8.5   HGB 9.4* 9.8* 8.7*   HCT 30.6* 31.1* 28.9*     Chemistry:   Recent Labs     11/17/21  0145 11/16/21  0232 11/15/21  0216   BUN 54* 32* 54*   CREA 6.64* 5.17* 7.54*   CA 8.6 8.8 7.9*   K 4.7 4.6 4.8    142 136   CL 98* 102 99*   CO2 28 28 29   GLU 95 87 88            Procedures/imaging: see electronic medical records for all procedures, Xrays and details which were not copied into this note but were reviewed prior to creation of Plan          Assessment & Plan:       See above      Kim Pappas MD  11/17/2021

## 2021-11-17 NOTE — PROGRESS NOTES
Intern Progress Note  [facility]       Patient: Valentina Gaffney MRN: [id number]  CSN: [id number]    YOB: 1945  Age: 64 y.o. Sex: female    DOA: 11/12/2021 LOS:  LOS: 5 days                    SUBJECTIVE    Patient endorses 1 bout of emesis that resumed food after lunch yesterday. No other acute events overnight. She is resting comfortably this AM. Patient does not make much urine, but when she does she endorses dysuria and hematuria. Denies HA, F/C, nausea, abd pain, SOB, cough, CP, or other pain. ROS: Please see above for focused ROS. OBJECTIVE    Vital Signs:  Patient Vitals for the past 24 hrs:   Temp Pulse Resp BP SpO2   11/16/21 1601 98.6 °F (37 °C) 72 18 121/60 95 %   11/16/21 1158 98.5 °F (36.9 °C) 81 18 108/62 97 %   11/16/21 0824 98.1 °F (36.7 °C) 83 20 118/60 95 %         Intake/Output Summary (Last 24 hours) at 11/17/2021 0733  Last data filed at 11/16/2021 2130  Gross per 24 hour   Intake 250 ml   Output 2 ml   Net 248 ml       Lab/Data:  Recent Results (from the past 24 hour(s))   EKG, 12 LEAD, SUBSEQUENT    Collection Time: 11/16/21 10:37 AM   Result Value Ref Range    Ventricular Rate 79 BPM    Atrial Rate 79 BPM    P-R Interval 174 ms    QRS Duration 92 ms    Q-T Interval 390 ms    QTC Calculation (Bezet) 447 ms    Calculated P Axis 86 degrees    Calculated R Axis -19 degrees    Calculated T Axis 54 degrees    Diagnosis       Normal sinus rhythm  Normal ECG  When compared with ECG of 13-NOV-2021 12:02,  Sinus rhythm has replaced Atrial fibrillation  Vent.  rate has decreased BY  50 BPM  Confirmed by Ion Nichole MD, --- ([id number]) on 11/16/2021 61:47:14 PM     METABOLIC PANEL, BASIC    Collection Time: 11/17/21  1:45 AM   Result Value Ref Range    Sodium 136 136 - 145 mmol/L    Potassium 4.7 3.5 - 5.5 mmol/L    Chloride 98 (L) 100 - 111 mmol/L    CO2 28 21 - 32 mmol/L    Anion gap 10 3.0 - 18 mmol/L    Glucose 95 74 - 99 mg/dL    BUN 54 (H) 7.0 - 18 MG/DL Creatinine 6.64 (H) 0.6 - 1.3 MG/DL    BUN/Creatinine ratio 8 (L) 12 - 20      GFR est AA 7 (L) >60 ml/min/1.73m2    GFR est non-AA 6 (L) >60 ml/min/1.73m2    Calcium 8.6 8.5 - 10.1 MG/DL   CBC WITH AUTOMATED DIFF    Collection Time: 11/17/21  1:45 AM   Result Value Ref Range    WBC 8.9 4.6 - 13.2 K/uL    RBC 3.11 (L) 4.20 - 5.30 M/uL    HGB 9.4 (L) 12.0 - 16.0 g/dL    HCT 30.6 (L) 35.0 - 45.0 %    MCV 98.4 78.0 - 100.0 FL    MCH 30.2 24.0 - 34.0 PG    MCHC 30.7 (L) 31.0 - 37.0 g/dL    RDW 15.2 (H) 11.6 - 14.5 %    PLATELET 687 (H) 819 - 420 K/uL    MPV 9.8 9.2 - 11.8 FL    NRBC 0.0 0  WBC    ABSOLUTE NRBC 0.00 0.00 - 0.01 K/uL    NEUTROPHILS 58 40 - 73 %    LYMPHOCYTES 28 21 - 52 %    MONOCYTES 10 3 - 10 %    EOSINOPHILS 2 0 - 5 %    BASOPHILS 1 0 - 2 %    IMMATURE GRANULOCYTES 2 (H) 0.0 - 0.5 %    ABS. NEUTROPHILS 5.1 1.8 - 8.0 K/UL    ABS. LYMPHOCYTES 2.5 0.9 - 3.6 K/UL    ABS. MONOCYTES 0.9 0.05 - 1.2 K/UL    ABS. EOSINOPHILS 0.2 0.0 - 0.4 K/UL    ABS. BASOPHILS 0.1 0.0 - 0.1 K/UL    ABS. IMM. GRANS. 0.1 (H) 0.00 - 0.04 K/UL    DF AUTOMATED         PHYSICAL EXAM    General: Resting comfortably in hospital bed in NAD. Pleasant, cooperative. Cardiovascular:  RRR, no M/G/R. DP 2+ bilaterally. Respiratory: Normal work of breathing. No intercostal retractions or accessory muscle use. CTAB, no wheezes, rales or rhonchi. Bilateral and symmetric chest rise. Gastrointestinal: soft, NT, ND  MSK: Skin warm and dry. No grossly visible rashes, lesions, or ulcers. No BLE edema. Neurological/Psych:  Alert and oriented to person, time, place, and situation. Normal mood and affect. No focal neurological deficits. ASSESSMENT AND PLAN    64 y. o. female with PMH  A fib,ESRD on HD MWF,  chronic hypotension, DM, chronic back pain, depression now admitted with a fib & L hip/pelvic fracture.     A fib. HR 170s at presentation. S/p 15mg dilt in ED. HR now in 90s, no episodes of RVR overnight. EKG with a fib. Cardiology consulted, will follow up recs.  On eliquis 5mg BID. Not on any rate controlling meds at home due to chronic hypotension  - Patient currently with stable HR not on any meds. Can admin dilt if HR uncontrolled again.   - FU cards recs  - Continue Eliquis  - Continue tele     L hip/leg pain. CT L Hip: Comminuted fracture involving the left anterior acetabular wall with involvement of the base of the left superior pubic ramus which are not significantly displaced. Nondisplaced left inferior pubic ramus fracture.   - Ortho consulted - non surgical treatment. PT & pain control.   - Pain control: start with home pain regimen - hydromorphone 2 mg tablets -> on dialysis days take 1.5 tablets q12h, on NON dialysis days take 1/2 tablet q12h  - Acute rehab screen placed  - PT/OT     Chronic hypotension. On Midodrine 10 TID.  - Continue home midodrine     ESRD on HD MWF  - Consult nephro for HD while inpatient. - renally dose meds   - avoid nephrotoxic agents  - FU UA w/reflex microscopy      DM   10/8 A1c 4.6. Not on any medications.   - monitor BG on daily BMP     Chronic Back Pain 2/2 DDD  Home regimen: gabapentin 100mg take 2 tablets after dialysis; Hydromorphone 2mg prn - 1.5 tabs on dialysis days, 1/2 tab on non dialysis days.   - continue home pain regimen     Depression  Home med: cymbalta 20mg  - Continue home cymbalta. May need to discuss switching med given ESRD as discharge approaches.     Global Care:  - VS per unit routine  - supplemental O2 for sats < 92%  - incentive spirometer  - Will F/U with CM regarding SNF placement.  - PT/OT/CM     Diet  Adult Diet   DVT Prophylaxis  SCDs   GI Prophylaxis  Pantoprazole   Code status  DNR   Disposition  Telemetry       Point of Contact Son: Mark Kellyy            [facility] inpatient team is available 24/7 at [phone] should any further acute changes or concerns arise.      Anisha Wayne MD , PGY-1   [facility]   November 17, 2021, 8:23 AM

## 2021-11-17 NOTE — ROUTINE PROCESS
Bedside and Verbal shift change report given to [address] [facility] [address] (oncoming nurse) by Severa App RN (offgoing nurse). Report included the following information SBAR, Kardex, Intake/Output, MAR and Recent Results.

## 2021-11-18 LAB
ANION GAP SERPL CALC-SCNC: 8 MMOL/L (ref 3–18)
APPEARANCE UR: ABNORMAL
BACTERIA URNS QL MICRO: ABNORMAL /HPF
BASOPHILS # BLD: 0.1 K/UL (ref 0–0.1)
BASOPHILS NFR BLD: 1 % (ref 0–2)
BILIRUB UR QL: NEGATIVE
BUN SERPL-MCNC: 26 MG/DL (ref 7–18)
BUN/CREAT SERPL: 6 (ref 12–20)
CALCIUM SERPL-MCNC: 8.9 MG/DL (ref 8.5–10.1)
CHLORIDE SERPL-SCNC: 101 MMOL/L (ref 100–111)
CO2 SERPL-SCNC: 30 MMOL/L (ref 21–32)
COLOR UR: ABNORMAL
CREAT SERPL-MCNC: 4.3 MG/DL (ref 0.6–1.3)
DIFFERENTIAL METHOD BLD: ABNORMAL
EOSINOPHIL # BLD: 0.1 K/UL (ref 0–0.4)
EOSINOPHIL NFR BLD: 2 % (ref 0–5)
EPITH CASTS URNS QL MICRO: ABNORMAL /LPF (ref 0–5)
ERYTHROCYTE [DISTWIDTH] IN BLOOD BY AUTOMATED COUNT: 15.1 % (ref 11.6–14.5)
GLUCOSE BLD STRIP.AUTO-MCNC: 95 MG/DL (ref 70–110)
GLUCOSE SERPL-MCNC: 88 MG/DL (ref 74–99)
GLUCOSE UR STRIP.AUTO-MCNC: NEGATIVE MG/DL
HBV SURFACE AB SER QL IA: POSITIVE
HBV SURFACE AB SERPL IA-ACNC: 29.33 MIU/ML
HCT VFR BLD AUTO: 29.4 % (ref 35–45)
HEP BS AB COMMENT,HBSAC: NORMAL
HGB BLD-MCNC: 9 G/DL (ref 12–16)
HGB UR QL STRIP: ABNORMAL
IMM GRANULOCYTES # BLD AUTO: 0.1 K/UL (ref 0–0.04)
IMM GRANULOCYTES NFR BLD AUTO: 1 % (ref 0–0.5)
KETONES UR QL STRIP.AUTO: NEGATIVE MG/DL
LEUKOCYTE ESTERASE UR QL STRIP.AUTO: ABNORMAL
LYMPHOCYTES # BLD: 2.2 K/UL (ref 0.9–3.6)
LYMPHOCYTES NFR BLD: 31 % (ref 21–52)
MAGNESIUM SERPL-MCNC: 2.2 MG/DL (ref 1.6–2.6)
MCH RBC QN AUTO: 30.9 PG (ref 24–34)
MCHC RBC AUTO-ENTMCNC: 30.6 G/DL (ref 31–37)
MCV RBC AUTO: 101 FL (ref 78–100)
MONOCYTES # BLD: 0.8 K/UL (ref 0.05–1.2)
MONOCYTES NFR BLD: 11 % (ref 3–10)
NEUTS SEG # BLD: 3.7 K/UL (ref 1.8–8)
NEUTS SEG NFR BLD: 53 % (ref 40–73)
NITRITE UR QL STRIP.AUTO: NEGATIVE
NRBC # BLD: 0 K/UL (ref 0–0.01)
NRBC BLD-RTO: 0 PER 100 WBC
PH UR STRIP: 7.5 [PH] (ref 5–8)
PLATELET # BLD AUTO: 343 K/UL (ref 135–420)
PMV BLD AUTO: 9.7 FL (ref 9.2–11.8)
POTASSIUM SERPL-SCNC: 4.3 MMOL/L (ref 3.5–5.5)
PROT UR STRIP-MCNC: 300 MG/DL
RBC # BLD AUTO: 2.91 M/UL (ref 4.2–5.3)
RBC #/AREA URNS HPF: ABNORMAL /HPF (ref 0–5)
SODIUM SERPL-SCNC: 139 MMOL/L (ref 136–145)
SP GR UR REFRACTOMETRY: 1.02 (ref 1–1.03)
UROBILINOGEN UR QL STRIP.AUTO: 0.2 EU/DL (ref 0.2–1)
WBC # BLD AUTO: 6.9 K/UL (ref 4.6–13.2)
WBC URNS QL MICRO: ABNORMAL /HPF (ref 0–4)

## 2021-11-18 PROCEDURE — 97535 SELF CARE MNGMENT TRAINING: CPT

## 2021-11-18 PROCEDURE — 97116 GAIT TRAINING THERAPY: CPT

## 2021-11-18 PROCEDURE — 74011250637 HC RX REV CODE- 250/637: Performed by: INTERNAL MEDICINE

## 2021-11-18 PROCEDURE — 80048 BASIC METABOLIC PNL TOTAL CA: CPT

## 2021-11-18 PROCEDURE — 83735 ASSAY OF MAGNESIUM: CPT

## 2021-11-18 PROCEDURE — 97530 THERAPEUTIC ACTIVITIES: CPT

## 2021-11-18 PROCEDURE — 87661 TRICHOMONAS VAGINALIS AMPLIF: CPT

## 2021-11-18 PROCEDURE — 85025 COMPLETE CBC W/AUTO DIFF WBC: CPT

## 2021-11-18 PROCEDURE — 81001 URINALYSIS AUTO W/SCOPE: CPT

## 2021-11-18 PROCEDURE — 65270000029 HC RM PRIVATE

## 2021-11-18 PROCEDURE — 74011250637 HC RX REV CODE- 250/637

## 2021-11-18 PROCEDURE — 74011000250 HC RX REV CODE- 250

## 2021-11-18 PROCEDURE — 87086 URINE CULTURE/COLONY COUNT: CPT

## 2021-11-18 PROCEDURE — 36415 COLL VENOUS BLD VENIPUNCTURE: CPT

## 2021-11-18 PROCEDURE — 82962 GLUCOSE BLOOD TEST: CPT

## 2021-11-18 RX ADMIN — CHOLECALCIFEROL TAB 25 MCG (1000 UNIT) 2000 UNITS: 25 TAB at 17:21

## 2021-11-18 RX ADMIN — Medication 10 ML: at 08:25

## 2021-11-18 RX ADMIN — LEVOTHYROXINE SODIUM 125 MCG: 25 TABLET ORAL at 06:04

## 2021-11-18 RX ADMIN — MIDODRINE HYDROCHLORIDE 10 MG: 5 TABLET ORAL at 16:11

## 2021-11-18 RX ADMIN — DULOXETINE HYDROCHLORIDE 20 MG: 20 CAPSULE, DELAYED RELEASE ORAL at 08:23

## 2021-11-18 RX ADMIN — APIXABAN 5 MG: 5 TABLET, FILM COATED ORAL at 08:23

## 2021-11-18 RX ADMIN — MIDODRINE HYDROCHLORIDE 10 MG: 5 TABLET ORAL at 08:23

## 2021-11-18 RX ADMIN — AMIODARONE HYDROCHLORIDE 200 MG: 200 TABLET ORAL at 08:23

## 2021-11-18 RX ADMIN — LATANOPROST 1 DROP: 50 SOLUTION OPHTHALMIC at 23:42

## 2021-11-18 RX ADMIN — CYANOCOBALAMIN TAB 1000 MCG 1000 MCG: 1000 TAB at 08:23

## 2021-11-18 RX ADMIN — NEPHROCAP 1 CAPSULE: 1 CAP ORAL at 08:23

## 2021-11-18 RX ADMIN — MIDODRINE HYDROCHLORIDE 10 MG: 5 TABLET ORAL at 11:32

## 2021-11-18 RX ADMIN — Medication 1 TABLET: at 08:23

## 2021-11-18 RX ADMIN — APIXABAN 5 MG: 5 TABLET, FILM COATED ORAL at 17:21

## 2021-11-18 RX ADMIN — ACETAMINOPHEN 650 MG: 325 TABLET ORAL at 08:37

## 2021-11-18 RX ADMIN — Medication 1 CAPSULE: at 08:23

## 2021-11-18 RX ADMIN — Medication 10 ML: at 13:24

## 2021-11-18 RX ADMIN — Medication 500 MG: at 08:23

## 2021-11-18 RX ADMIN — CHOLECALCIFEROL TAB 25 MCG (1000 UNIT) 2000 UNITS: 25 TAB at 08:23

## 2021-11-18 RX ADMIN — Medication 10 ML: at 23:42

## 2021-11-18 RX ADMIN — PANTOPRAZOLE SODIUM 20 MG: 20 TABLET, DELAYED RELEASE ORAL at 08:23

## 2021-11-18 RX ADMIN — ALLOPURINOL 100 MG: 100 TABLET ORAL at 08:23

## 2021-11-18 NOTE — PROGRESS NOTES
Received call from Annette Mccarthy with [facility], stating that authorization should be done by early afternoon today.     Randolph Junior RN

## 2021-11-18 NOTE — PROGRESS NOTES
Intern Progress Note  [facility]       Patient: Ev Menendez MRN: [id number]  CSN: [id number]    YOB: 1945  Age: 64 y.o. Sex: female    DOA: 11/12/2021 LOS:  LOS: 6 days                    SUBJECTIVE    No other acute events overnight. She is resting comfortably this AM. Patient does not make much urine, and denies dysuria and hematuria. Denies HA, F/C, nausea, abd pain, SOB, cough, CP, or back pain. ROS: Please see above for focused ROS. OBJECTIVE    Vital Signs:  Patient Vitals for the past 24 hrs:   Temp Pulse Resp BP SpO2   11/18/21 0819 98.3 °F (36.8 °C) (!) 53 18 (!) 121/51 95 %   11/18/21 0414 97.6 °F (36.4 °C) 67 18 122/64 97 %   11/18/21 0000 97.8 °F (36.6 °C) 79 20 (!) 98/47 93 %   11/17/21 1959 98.2 °F (36.8 °C) 93 20 122/68 94 %   11/17/21 1739 97.9 °F (36.6 °C) 72 18 (!) 103/56 --   11/17/21 1730 -- 66 -- 118/60 --   11/17/21 1715 -- 80 -- 124/60 --   11/17/21 1700 -- (!) 57 -- (!) 146/78 --   11/17/21 1645 -- 70 -- 116/64 --   11/17/21 1630 -- 70 -- (!) 109/55 --   11/17/21 1615 -- 94 -- (!) 113/56 --   11/17/21 1600 -- 80 -- (!) 112/57 --   11/17/21 1545 -- 79 -- 122/63 --   11/17/21 1530 -- 78 -- (!) 92/58 --   11/17/21 1515 -- 77 -- 105/69 --   11/17/21 1500 -- 100 -- 114/63 --   11/17/21 1445 -- 67 -- (!) 95/57 --   11/17/21 1430 -- 72 -- 113/61 --   11/17/21 1423 -- 69 -- (!) 111/50 --   11/17/21 1419 98.1 °F (36.7 °C) 68 18 (!) 107/58 --   11/17/21 1159 98 °F (36.7 °C) 84 18 123/60 97 %   11/17/21 0838 98 °F (36.7 °C) 82 16 119/64 98 %         Intake/Output Summary (Last 24 hours) at 11/18/2021 0824  Last data filed at 11/18/2021 8222  Gross per 24 hour   Intake 640 ml   Output 2000 ml   Net -1360 ml       Lab/Data:  Recent Results (from the past 24 hour(s))   HEP B SURFACE AG    Collection Time: 11/17/21  2:20 PM   Result Value Ref Range    Hepatitis B surface Ag <0.10 <1.00 Index    Hep B surface Ag Interp.  Negative NEG     GLUCOSE, POC Collection Time: 11/17/21  8:25 PM   Result Value Ref Range    Glucose (POC) 136 (H) 70 - 110 mg/dL   CBC WITH AUTOMATED DIFF    Collection Time: 11/18/21  1:43 AM   Result Value Ref Range    WBC 6.9 4.6 - 13.2 K/uL    RBC 2.91 (L) 4.20 - 5.30 M/uL    HGB 9.0 (L) 12.0 - 16.0 g/dL    HCT 29.4 (L) 35.0 - 45.0 %    .0 (H) 78.0 - 100.0 FL    MCH 30.9 24.0 - 34.0 PG    MCHC 30.6 (L) 31.0 - 37.0 g/dL    RDW 15.1 (H) 11.6 - 14.5 %    PLATELET 218 159 - 315 K/uL    MPV 9.7 9.2 - 11.8 FL    NRBC 0.0 0  WBC    ABSOLUTE NRBC 0.00 0.00 - 0.01 K/uL    NEUTROPHILS 53 40 - 73 %    LYMPHOCYTES 31 21 - 52 %    MONOCYTES 11 (H) 3 - 10 %    EOSINOPHILS 2 0 - 5 %    BASOPHILS 1 0 - 2 %    IMMATURE GRANULOCYTES 1 (H) 0.0 - 0.5 %    ABS. NEUTROPHILS 3.7 1.8 - 8.0 K/UL    ABS. LYMPHOCYTES 2.2 0.9 - 3.6 K/UL    ABS. MONOCYTES 0.8 0.05 - 1.2 K/UL    ABS. EOSINOPHILS 0.1 0.0 - 0.4 K/UL    ABS. BASOPHILS 0.1 0.0 - 0.1 K/UL    ABS. IMM. GRANS. 0.1 (H) 0.00 - 0.04 K/UL    DF AUTOMATED     MAGNESIUM    Collection Time: 11/18/21  1:43 AM   Result Value Ref Range    Magnesium 2.2 1.6 - 2.6 mg/dL   METABOLIC PANEL, BASIC    Collection Time: 11/18/21  1:43 AM   Result Value Ref Range    Sodium 139 136 - 145 mmol/L    Potassium 4.3 3.5 - 5.5 mmol/L    Chloride 101 100 - 111 mmol/L    CO2 30 21 - 32 mmol/L    Anion gap 8 3.0 - 18 mmol/L    Glucose 88 74 - 99 mg/dL    BUN 26 (H) 7.0 - 18 MG/DL    Creatinine 4.30 (H) 0.6 - 1.3 MG/DL    BUN/Creatinine ratio 6 (L) 12 - 20      GFR est AA 12 (L) >60 ml/min/1.73m2    GFR est non-AA 10 (L) >60 ml/min/1.73m2    Calcium 8.9 8.5 - 10.1 MG/DL   GLUCOSE, POC    Collection Time: 11/18/21  6:19 AM   Result Value Ref Range    Glucose (POC) 95 70 - 110 mg/dL       PHYSICAL EXAM    General: Resting comfortably in hospital bed in NAD. Pleasant, cooperative. Cardiovascular:  RRR, no M/G/R. DP 2+ bilaterally. Respiratory: Normal work of breathing. No intercostal retractions or accessory muscle use. CTAB, no wheezes, rales or rhonchi. Bilateral and symmetric chest rise. Gastrointestinal: soft, NT, ND  MSK: Skin warm and dry. No grossly visible rashes, lesions, or ulcers. No BLE edema. Neurological/Psych:  Alert and oriented to person, time, place, and situation. Normal mood and affect. No focal neurological deficits. ASSESSMENT AND PLAN    64 y. o. female with PMH  A fib,ESRD on HD MWF,  chronic hypotension, DM, chronic back pain, depression now admitted with a fib & L hip/pelvic fracture.     A fib. HR 170s at presentation. S/p 15mg dilt in ED. HR now in 90s, no episodes of RVR overnight. EKG with a fib. Cardiology consulted, will follow up recs.  On eliquis 5mg BID. Not on any rate controlling meds at home due to chronic hypotension  - Patient currently with stable HR not on any meds. Can admin dilt if HR uncontrolled again.   - FU cards recs  - Continue Eliquis  - Continue tele     L hip/leg pain. CT L Hip: Comminuted fracture involving the left anterior acetabular wall with involvement of the base of the left superior pubic ramus which are not significantly displaced. Nondisplaced left inferior pubic ramus fracture.   - Ortho consulted - non surgical treatment. PT & pain control.   - Pain control: start with home pain regimen - hydromorphone 2 mg tablets -> on dialysis days take 1.5 tablets q12h, on NON dialysis days take 1/2 tablet q12h  - Accepted to ARU   - PT/OT     Chronic hypotension. On Midodrine 10 TID.  - Continue home midodrine     ESRD on HD MWF  - Consult nephro for HD while inpatient. - renally dose meds   - avoid nephrotoxic agents  - UA: Large leukocyte esterase  - Infectious disease consulted, FU recs. Appreciate the assistance.     DM   10/8 A1c 4.6.  Not on any medications.   - monitor BG on daily BMP     Chronic Back Pain 2/2 DDD  Home regimen: gabapentin 100mg take 2 tablets after dialysis; Hydromorphone 2mg prn - 1.5 tabs on dialysis days, 1/2 tab on non dialysis days.   - continue home pain regimen     Depression  Home med: cymbalta 20mg  - Continue home cymbalta.      Global Care:  - VS per unit routine  - supplemental O2 for sats < 92%  - incentive spirometer  - Will F/U with CM regarding ARU placement.  - PT/OT/CM     Diet  Adult Diet   DVT Prophylaxis  SCDs   GI Prophylaxis  Pantoprazole   Code status  DNR   Disposition  Telemetry       Point of Contact Son: Karol Figueroa  [phone]          [facility] inpatient team is available 24/7 at [phone] should any further acute changes or concerns arise.      Rachelle Merino MD , PGY-1   [facility]   November 18, 2021, 8:23 AM

## 2021-11-18 NOTE — PROGRESS NOTES
Problem: Self Care Deficits Care Plan (Adult)  Goal: *Acute Goals and Plan of Care (Insert Text)  Description: Occupational Therapy Goals  Initiated 11/13/2021 within 7 day(s). 1.  Patient will perform seated functional task with supervision/set-up at EOB for 10 min with stable VS.  2.  Patient will perform bathing with supervision/set-up. 3.  Patient will perform lower body dressing with supervision/set-up and AE prn.  4.  Patient will perform toilet transfers with supervision/set-up. 5.  Patient will perform all aspects of toileting with supervision/set-up. 6.  Patient will perform standing grooming task with SBA for 2  minutes with Fair+ balance with stable VS.  7.  Patient will utilize energy conservation techniques during functional activities with verbal cues. Prior Level of Function: Pt reports being Mod Ind for ADLs and functional mobility using walker or rollator. Pt completed course of OT and PT at home. Pt lives with son available to assist prn. Outcome: Progressing Towards Goal   OCCUPATIONAL THERAPY TREATMENT    Patient: Ev Menendez [de-identified]64 y.o. female)  Date: 11/18/2021  Diagnosis: Afib ([address] [I48.91]  Atrial fibrillation ([address] [I48.91]  Hip fracture ([address] [S72.009A]   Atrial fibrillation ([address]       Precautions: Contact, TTWB (left LE)  PLOF: Pt reports being Mod Ind for ADLs and functional mobility using walker or rollator. Pt completed course of OT and PT at home. Pt lives with son available to assist prn. Chart, occupational therapy assessment, plan of care, and goals were reviewed. ASSESSMENT:  Pt presented sitting upright in reclining chair upon therapist arrival and agreeable for participation. Pt performed STS transfer CGA with RW and pivoted to [facility] w/ CGA. She required CGA for toileting routine 2/2 decreased dyn standing balance utilizing RW. Pt returned to sitting EOB upon completion 2/2 RN needing specimen from pt. She transitioned to supine with SBA.  Pt requesting to return back to reclining chair and came back to EOB w/ SBA. Pt maneuvered to reclining chair CGA with RW. She was positioned for comfort and left with B LE's elevated and all needs left within reach. RN present. Progression toward goals:  []          Improving appropriately and progressing toward goals  [x]          Improving slowly and progressing toward goals  []          Not making progress toward goals and plan of care will be adjusted     PLAN:  Patient continues to benefit from skilled intervention to address the above impairments. Continue treatment per established plan of care. Discharge Recommendations: Rehab   Further Equipment Recommendations for Discharge:  RW, [facility]     SUBJECTIVE:   Patient stated  It burns when I urinate. \"     OBJECTIVE DATA SUMMARY:   Cognitive/Behavioral Status:  Neurologic State: Eyes open spontaneously  Orientation Level: Oriented X4  Cognition: Appropriate decision making, Appropriate for age attention/concentration, Appropriate safety awareness, Follows commands  Safety/Judgement: Awareness of environment, Fall prevention    Functional Mobility and Transfers for ADLs:   Bed Mobility:     Supine to Sit: Stand-by assistance  Sit to Supine: Stand-by assistance      Transfers:  Sit to Stand: Contact guard assistance  Stand to Sit: Contact guard assistance     Bed to Chair: Contact guard assistance          Toilet Transfer : Contact guard assistance (to [facility] w/ RW)        Balance:  Sitting: Intact  Standing: Impaired; With support  Standing - Static: Fair  Standing - Dynamic : Fair    ADL Intervention:       Toileting  Bladder Hygiene: Contact guard assistance  Clothing Management: Contact guard assistance  Adaptive Equipment: Walker         Pain:  Pain level pre-treatment: 5/10   Pain level post-treatment: 5/10  Pain Intervention(s): Medication (see MAR);  Rest, Ice, Repositioning   Response to intervention: Nurse notified, See doc flow    Activity Tolerance:    Fair   Please refer to the flowsheet for vital signs taken during this treatment. After treatment:   [x]  Patient left in no apparent distress sitting up in chair  []  Patient left in no apparent distress in bed  [x]  Call bell left within reach  [x]  Nursing notified  [x]  RN present  []  Bed alarm activated    COMMUNICATION/EDUCATION:   [x] Role of Occupational Therapy in the acute care setting  [] Home safety education was provided and the patient/caregiver indicated understanding. [x] Patient/family have participated as able in working towards goals and plan of care. [x] Patient/family agree to work toward stated goals and plan of care. [] Patient understands intent and goals of therapy, but is neutral about his/her participation. [] Patient is unable to participate in goal setting and plan of care.       Thank you for this referral.  JUAQUIN Leon  Time Calculation: 23 mins

## 2021-11-18 NOTE — ROUTINE PROCESS
0729-/Bedside and Verbal shift change report given to Andria (oncoming nurse) by Leo Amor (offgoing nurse). Report included the following information SBAR, MAR and Recent Results. 0838-shift assessment completed. Morning meds given. 1734-blood pressure 88/43. Patient is asymptomatic. Claims \"this is normal for me. \" Will notify [facility] family team.    Marii Ontiveros notified. Will re-check in 30 minutes. 1837-blood pressure re-check reading of 97/52. Patient is asymptomatic. Will continue to monitor. 1926-/Bedside and Verbal shift change report given to Polly Angel (oncoming nurse) by Cheri Silva (offgoing nurse). Report included the following information SBAR, MAR and Recent Results.

## 2021-11-18 NOTE — ROUTINE PROCESS
Bedside and Verbal shift change report given to [address] [facility] (oncoming nurse) by Topher Ramos RN (offgoing nurse). Report included the following information SBAR, Kardex, Intake/Output, MAR and Recent Results.

## 2021-11-18 NOTE — PROGRESS NOTES
Comprehensive Nutrition Assessment    Type and Reason for Visit: Initial, RD nutrition re-screen/LOS    Nutrition Recommendations/Plan:   - Add supplement: Magic Cup TID  - Update food preferences in diet order.  - encourage/ monitor po intake of meals and supplements. - continue all other nutrition interventions. Nutrition Assessment:  Pt reported poor/fair appetite currently; eating ~30-40% of meals. Food preferences discussed. Agreeable to nutrition supplement; dislikes Nepro drinks; agreeable to magic cup, pt able to tolerate ice cream.    Malnutrition Assessment:  Malnutrition Status: At risk for malnutrition (specify) (decreased po intake since admission)      Nutrition History and Allergies: Past medical hx: acidosis, anemia, ESRD on HD, DM, HLD, HTN, hyperparathyroidism due to renal insufficiency, hypothyroid, kidney stone, recurrent UTI, appendectomy, cholecystectomy, hx of gastric bypass. Patient's weight was in the 200 lb range;  250 lb on 1/20/2020 (~2 years ago). Pt reported weighing 93 kg (204.6 lb) PTA, UBW after fluid is removed during dialysis. Currently weighing 180 lb per chart documentation; question if related to fluid status? Fair appetite/ po intake PTA; eats small meals. No known food allergies     Estimated Daily Nutrient Needs:  Energy (kcal): 2971-6087; Weight Used for Energy Requirements: Other (specify) (SBW 82 kg)  Protein (g): 82-98; Weight Used for Protein Requirements: Other (specify) (SBWx1-1.2)  Fluid (ml/day): 750-1500; Method Used for Fluid Requirements: Standard renal      Nutrition Related Findings:  BM 11/17, 11/14. No edema. Last HD on 11/17; 2 L UF removed.       Wounds:     (knee and arm wounds)       Current Nutrition Therapies:  ADULT DIET Regular    Anthropometric Measures:  · Height:  5' 2\" (157.5 cm)  · Current Body Wt:  82 kg (180 lb 12.4 oz)   · Admission Body Wt:  205 lb 0.4 oz (pt stated)    · Usual Body Wt:  93 kg (205 lb 0.4 oz) · Ideal Body Wt:  110 lbs:  164.3 %   · Adjusted Body Weight:   ; Weight Adjustment for: No adjustment   · BMI Category:  Obese class 1 (BMI 30.0-34. 9)       Nutrition Diagnosis:   · Inadequate oral intake related to early satiety (decreased appetite) as evidenced by intake 26-50%      Nutrition Interventions:   Food and/or Nutrient Delivery: Continue current diet, Mineral supplement, Vitamin supplement, Start oral nutrition supplement  Nutrition Education and Counseling: Education not indicated, No recommendations at this time  Coordination of Nutrition Care: Continue to monitor while inpatient    Goals:  PO nutrition intake will meet >75% of patient's estimated nutrition needs within the next 7 days       Nutrition Monitoring and Evaluation:   Behavioral-Environmental Outcomes: None identified  Food/Nutrient Intake Outcomes: Food and nutrient intake, Supplement intake, Vitamin/mineral intake  Physical Signs/Symptoms Outcomes: Biochemical data, Meal time behavior, Nutrition focused physical findings    Discharge Planning:    Continue oral nutrition supplement, Continue current diet     Electronically signed by Ramesh Horan RD on 11/18/2021 at 2:57 PM    Contact: [phone]

## 2021-11-18 NOTE — DISCHARGE SUMMARY
Discharge Summary  [facility]      Patient: Stevenson Phalen MRN: [id number]  CSN: [id number]    YOB: 1945  Age: 64 y.o. Sex: female      Admission Date: 11/12/2021 Discharge Date: 11/19/2021   Attending: Bernice Urbina MD PCP: Kirt Welsh MD     Reason for Admission:   Afib [facility]) [I48.91]  Atrial fibrillation ([facility] [address] [I48.91]  Hip fracture ([facility] [address] [S72.009A]    Discharge Diagnoses:   Left Acetabulum/Pelvis Fracture  End Stage Renal Disease  Urinary Tract Infection  Paroxysmal Atrial Fibrillation w/RVR  Chronic Hypotension     Important recommendations for follow-up studies and evaluations   -Monitor BP for worsening hypotension and/or symptomatic changes. Patient previously started on 0.2mg Fludrocortisone during prior admission. Not determined to be necessary during this admission, but consider restarting medication for worsening hypotension.   -Recommended to follow up w/cardiologist Dr. Ronan Maxwell 6 weeks after discharge  -Recommended to follow up w/urologist Dr. Timi Zhang after discharge    Pending labs and studies:  Urine cultures    Operative Procedures:   None    Discharge Medications:     Current Discharge Medication List      START taking these medications    Details   amiodarone (CORDARONE) 200 mg tablet Take 1 Tablet by mouth daily. Qty: 30 Tablet, Refills: 0  Start date: 11/20/2021      cefpodoxime (VANTIN) 200 mg tablet Take 1 Tablet by mouth every evening. Qty: 6 Tablet, Refills: 0  Start date: 11/20/2021    Comments: PATIENT TO START 6 DAY COURSE BEGINNING 10/20/2021 at 5:00PM         CONTINUE these medications which have CHANGED    Details   midodrine (PROAMATINE) 10 mg tablet Take 1 Tablet by mouth three (3) times daily (with meals) for 30 days.   Qty: 90 Tablet, Refills: 0  Start date: 11/19/2021, End date: 12/19/2021         CONTINUE these medications which have NOT CHANGED    Details   acetaminophen (Tylenol Extra Strength) 500 mg tablet Take 1 Tablet by mouth every six (6) hours as needed for Pain. Qty: 30 Tablet, Refills: 0      apixaban (ELIQUIS) 5 mg tablet Take 1 Tablet by mouth two (2) times a day. Qty: 60 Tablet, Refills: 0      HYDROmorphone (DILAUDID) 2 mg tablet Take 1 mg by mouth every twelve (12) hours as needed for Pain. meclizine (ANTIVERT) 25 mg tablet Take 25 mg by mouth three (3) times daily as needed for Dizziness. methoxy peg-epoetin beta (MIRCERA INJECTION) 30 mcg. DULoxetine (Cymbalta) 20 mg capsule Take 20 mg by mouth daily. b complex vitamins (Vitamins B Complex) tablet Take 1 Tab by mouth daily. estradioL (Estrace) 0.01 % (0.1 mg/gram) vaginal cream Apply a fingertip amount around the urethra three times a week. Qty: 30 g, Refills: 3      biotin 1,000 mcg chew Take 1 Tab by mouth daily. cyanocobalamin 1,000 mcg tablet Take 1,000 mcg by mouth daily. lactobacillus sp. 50 billion cpu (BIO-K PLUS) 50 billion cell -375 mg cap capsule Take 1 Cap by mouth daily. Qty: 30 Cap, Refills: 2      allopurinoL (ZYLOPRIM) 100 mg tablet Take 100 mg by mouth daily. ascorbic acid, vitamin C, (VITAMIN C) 500 mg tablet Take 500 mg by mouth daily. calcitRIOL (ROCALTROL) 0.25 mcg capsule Take 0.25 mcg by mouth daily. cholecalciferol (VITAMIN D3) (2,000 UNITS /50 MCG) cap capsule Take 2,000 Units by mouth two (2) times a day. Take two tabs a total of 4000 units      latanoprost (XALATAN) 0.005 % ophthalmic solution Administer 1 Drop to both eyes nightly. One drop at bedtime      levothyroxine (SYNTHROID) 125 mcg tablet Take 125 mcg by mouth Daily (before breakfast). omeprazole (PRILOSEC) 20 mg capsule Take 20 mg by mouth daily. ondansetron (ZOFRAN ODT) 4 mg disintegrating tablet Take 4 mg by mouth every eight (8) hours as needed for Nausea or Vomiting. vit B Cmplx 3-FA-Vit C-Biotin (NEPHRO HOWIE RX) 1- mg-mg-mcg tablet Take 1 Tab by mouth daily.       lidocaine (LIDODERM) 5 % Apply patch to the affected area for 12 hours a day and remove for 12 hours a day. Qty: 1 Each, Refills: 0      gabapentin (NEURONTIN) 100 mg capsule Take 2 Capsules by mouth Three (3) times a week. Max Daily Amount: 200 mg. Take 2 capsules by mouth 3 times a week as needed for pain post dialysis. Indications: concern for back pain post dialysis  Qty: 15 Capsule, Refills: 0    Associated Diagnoses: Type 2 diabetes mellitus with diabetic autonomic neuropathy, without long-term current use of insulin ([facility]); ESRD (end stage renal disease) ([facility])         STOP taking these medications       fludrocortisone (FLORINEF) 0.1 mg tablet Comments:   Reason for Stopping:         lidocaine 4 % patch Comments:   Reason for Stopping:               Disposition: Acute Rehab    Consultants:    Orthopedic Surgery  Nephrology  Infectious Disease    Brief Hospital Course   Ms. Ev Menendez is a 64 y.o. F w/ PMHx A Fib, ESRD on HD MWF, chronic hypotension, DM, chronic back pain, MDD, recurrent UTIs who presented to [facility] ED via EMS w/ rate uncontrolled A Fib and severe L hip and LE pain that restricted ambulation. Given diltiazem which decreased HR to low 100s. CT LLE showed comminuted fracture involving the L anterior acetabular wall w/involvement of the base of the left superior pubic ramus w/o significant displacement. Orthopedic surgery consulted and recommended conservative, non-surgical treatment w/PT and pain control. Pain control achieved w/home regimen. PT and OT followed throughout hospitalization. PT/OT recommended placement in acute rehab unit for additional functional mobility training. Given symptomatic dysuria awaiting ARU, UA and UCx examined. ID consulted given resistant microbial history in the setting of recurrent UTIs. CT Abd/Pelvis shows intact R ureteral stent and absence of any hydronephrosis. Patient initially started on IV Ceftriaxone and transitioned to 6 day course of 200mg PO Cefpodoxime every day to start on 11/20/2021 in evening. Patient instructed to f/u with Urology in OP setting. Management of patient's health issues during this hospitalization are described in more detail below:    Paroxysmal A Fib w/RVR (stable)  Echo EF 55-60%. HR 170s at presentation. S/p 10mg diltiazem in ED. HR now in 90s. EKG c/w rate uncontrolled A fib. Cardiology consulted. On eliquis 5mg BID. Patient on continuous telemetry throughout hospitalization. Per cardiology recommendations, patient started on 200mg Amiodarone QD. L hip/leg pain (improving)  CT L Hip: Comminuted fracture involving the left anterior acetabular wall with involvement of the base of the left superior pubic ramus which are not significantly displaced. Nondisplaced left inferior pubic ramus fracture. XRAY L Knee: Total left knee arthroplasty without obvious acute complication or acute osseous findings. Suspected small joint effusion. XRAY L Femur: No femur fracture. Left acetabular and pubic ramus fracture better seen on recent CT scan. Orthopedic surgery consulted and recommended conservative, non-surgical treatment w/PT and pain control. Pain control achieved w/home regimen as specified below. Recurrent UTIs  S/p ureteral stricture stent exchange 10/5/21. Followed by Dr. Marry Combs in OP setting. UA on 11/18/21 positive for large leukocyte esterase, negative nitrites and excess WBCs. CBC monitored daily w/o leukocytosis. VS monitored per unit protocol. Infectious Disease consulted for Abx recommendations given complicated urologic hx. CT Abd/Pelvis shows R ureteral stent in place and absence of any hydronephrosis. Initially started on IV Ceftriaxone and transitioned to 6 day course of 200mg Cefpodoxime PO starting evening of 11/20/2021. Recommend lifestyle modifications including increased PO fluid, timed voiding, cranberry and D-mannose supplements in OP setting. Patient to F/U w/ Urology in OP setting.      Chronic Back Pain (stable)  Home medications: 2 tabs 100mg gabapentin after dialysis, 1.5 tabs 2mg hydromorphone PRN on dialysis days and 0.5 tabs PRN on non-dialysis days. Patient resumed on home medications throughout hospitalization. Patient to resume medication on discharge.      Chronic hypotension (stable)  Home medications: 10mg Midodrine TID. Continued on home midodrine. VS monitored per unit protocol.      ESRD on HD MWF (stable)  After undergoing full HD session on day of admission, BMP wnl. Cr 3.10. Cr monitored throughout hospitalization. All nephrotoxins avoided and medications renally dosed. Nephrology consulted for HD while inpatient. Patient to resume HD schedule upon discharge.      DM (stable)  On 10/8/21, HgbA1c found to be 4.6. Patient not on any home medications. BG monitored throughout hospitalization.      Depression (stable)  Home medication: 20mg Cymbalta every day. Patient resumed on home medications throughout hospitalization. Patient to resume medication on discharge. Hypothyroidism (stable)  Home medication: 125mcg Synthroid every day. Patient resumed on home medications throughout hospitalization. Patient to resume medication on discharge.      [employer]  VS monitored per unit routine and supplemental O2 available for sats < 92%. PT, OT, and CM consulted upon admission and followed patient throughout her hospitalization. CURRENT ADMISSION IMAGING RESULTS   XR FEMUR LT 2 V    Result Date: 11/13/2021  No femur fracture. Left acetabular and pubic ramus fracture better seen on recent CT scan     CTA CHEST W OR W WO CONT    Result Date: 11/13/2021  1. No CT finding for acute pulmonary embolism or other acute process of the chest. No secondary CT findings for fat embolism. CT ABD PELV WO CONT    Result Date: 11/19/2021  1. Right ureter stent is in place. Mild caliectasis surrounding the proximal coiled end of the stent. No hydronephrosis. No renal calculi. 2.  Small hiatal hernia. 3.  Mild stool burden in the colon. 4.  Moderate anasarca.  Thank you for this referral.     CT HIP LT WO CONT    Result Date: 11/12/2021  1. Comminuted fracture involving the left anterior acetabular wall with involvement of the base of the left superior pubic ramus which are not significantly displaced. 2.  Nondisplaced left inferior pubic ramus fracture. XR CHEST PORT    Result Date: 11/12/2021  No clearly acute findings. XR KNEE LT 3 V    Result Date: 11/12/2021  Total left knee arthroplasty without obvious acute complication or acute osseous findings. Suspected small joint effusion. See additional details above. Cardiology Procedures/Testing:  MODALITY RESULTS   EKG Results for orders placed or performed during the hospital encounter of 11/12/21   EKG, 12 LEAD, INITIAL   Result Value Ref Range    Ventricular Rate 132 BPM    QRS Duration 76 ms    Q-T Interval 330 ms    QTC Calculation (Bezet) 488 ms    Calculated R Axis -29 degrees    Calculated T Axis 50 degrees    Diagnosis       atrial fibrillation with rvr  premature ventricular complexes  Otherwise normal ECG  When compared with ECG of 12-NOV-2021 16:10,  Sinus rhythm has replaced Atrial fibrillation  Confirmed by [employer] [facility] ([id number]) on 11/14/2021 12:53:12 PM         ECHO 10/08/21    ECHO ADULT FOLLOW-UP OR LIMITED 10/10/2021 10/10/2021    Interpretation Summary  · LV: Estimated LVEF is 55 - 60%. Visually measured ejection fraction. Normal cavity size and systolic function (ejection fraction normal). Mild concentric hypertrophy. Wall motion: normal. Age-appropriate left ventricular diastolic function. · PA: Pulmonary arterial systolic pressure is 35 mmHg. Signed by: Endy Hendrix MD on 10/10/2021 11:57 AM     IR Results from Hospital Encounter encounter on 10/14/20    IR NEPHROSTOMY PERC RT PLC CATH  SI    Impression  IMPRESSION:    Technically successful nephrostogram.    Technically successful fluoroscopy assisted exchange for 8 Setswana double-J  catheter, right side.     Moderate sedation provided for the procedure, 30 minutes      Results from [place] encounter on 07/15/20    IR EXCHANGE NEPHRO PERC RT SI    Impression  IMPRESSION:    Successful, uncomplicated right diagnostic antegrade nephrostogram with  antegrade nephroureteral stent exchange. Proximal and distal renal collecting  systems completely decompressed. Free flow contrast media in the urinary  bladder. No debris. No hydronephrosis or hydroureter. Plan: Patient should come back interventional radiology department at  approximately 8 weeks for routine exchange of the right nephroureteral stent. Results from [place] encounter on 04/13/20    IR NEPHROURETERAL PERC RT PLC CATH NEW ACCESS  SI    Impression  IMPRESSION:    Successful uncomplicated right nephrostomy tube removal, high-grade distal right  ureteral stricture recanalization, right ureteral high-grade stricture balloon  ureteroplasty as well as placement of the new right nephroureteral catheter. Plan: Patient will come back to interventional radiology in approximately 4  weeks for detailed antegrade nephrostogram and possible nephroureteral catheter  removal. If stricture persists however internalization with double-J stent will  be considered. CATH 09/24/20    INVASIVE VASCULAR PROCEDURE 09/26/2020 9/26/2020    Narrative  Preoperative diagnosis: Left upper extremity malfunctioning arteriovenous fistula end-stage renal disease    Postoperative diagnosis:  At the extremity malfunctioning tube and fistula with end-stage renal disease    Procedures performed:  #1  Left upper extremity fistulagram  #2  Central venogram  #3  Reflux arteriogram      Cultures: None    Specimens: None    Drains: None    Estimated blood loss: Less than 50 mL    Assistants: None    Implants: Please see above    Complications: None    Anesthesia: Moderate conscious sedation of 9 minutes was performed by an independent provider giving the medications per my discretion and monitoring of vital signs throughout the case. Indications for the procedure:  Aashish Dang is a 66 y.o. female with end-stage renal disease who was referred by dialysis center after they had difficulty cannulating her fistula. She states this is only occasionally depends on the tech. She has no other complaints. We discussed performing left lower extremity fistulogram.  Patient was given the appropriate risk and benefits of the procedure including but not limited to bleeding, infection, damage to adjacent structures, MI, stroke, death, loss of lower extremity, need for further surgery. Patient was understanding of all the risks and underwent a procedure. Operative findings:  #1  No evidence of venous outflow stenosis or arterial inflow stenosis    Procedure:  Patient was correctly identified in the precath area and taken to the Cath Lab in stable condition. Patient had pre-incision timeout prior to any incision. Patient was prepped and draped in the normal sterile fashion according to CDC guidelines aseptic technique. We then anesthetized the left arm with 1% lidocaine and obtain percutaneous access of the left lower extremity arteriovenous fistula. We then performed a fistulogram with central venogram reflux arteriogram.  No evidence of stenosis of the identified. I treated placed in the we advanced a manual wire and a micro-sheath. After completing our fistulogram we remove the micro sheath and held manual pressure for hemostasis. I was present scrubbed and entire procedure all counts were correct. Signed by:  Devyn Newman MD on 9/26/2020 11:22 AM       Special Testing/Procedures:  MODALITY RESULTS   MICRO All Micro Results     Procedure Component Value Units Date/Time    CULTURE, URINE [[id number]] Collected: 11/18/21 1318    Order Status: Completed Specimen: Cath Urine Updated: 11/18/21 2227    CULTURE, URINE [[id number]]  (Abnormal) Collected: 11/16/21 1205    Order Status: Completed Specimen: Urine from Clean catch Updated: 11/18/21 0014     Special Requests: NO SPECIAL REQUESTS        [place] Count --        >100,000  COLONIES/mL       Culture result: PROTEUS SPECIES            ABG No results found for: PH, PHI, PCO2, PCO2I, PO2, PO2I, HCO3, HCO3I, FIO2, FIO2I   UA Results for orders placed or performed in visit on 02/19/21   AMB POC URINALYSIS DIP STICK AUTO W/O MICRO     Status: None   Result Value Ref Range Status    Color (UA POC) Dark Yellow  Final     Comment: Milky    Clarity (UA POC) Turbid  Final    Glucose (UA POC) Negative Negative Final    Bilirubin (UA POC) Negative Negative Final    Ketones (UA POC) Negative Negative Final    Specific gravity (UA POC) 1.025 1.001 - 1.035 Final    Blood (UA POC) 3+ Negative Final    pH (UA POC) 7.5 4.6 - 8.0 Final    Protein (UA POC) 3+ Negative Final    Urobilinogen (UA POC) 0.2 mg/dL 0.2 - 1 Final    Nitrites (UA POC) Negative Negative Final    Leukocyte esterase (UA POC) 3+ Negative Final        Laboratory Results:  LABORATORY RESULTS   HEMATOLOGY Lab Results   Component Value Date/Time    WBC 8.7 11/19/2021 01:24 AM    Hemoglobin, POC 8.8 (L) 09/24/2020 08:45 AM    HGB 9.2 (L) 11/19/2021 01:24 AM    Hematocrit, POC 26 (L) 09/24/2020 08:45 AM    HCT 29.9 (L) 11/19/2021 01:24 AM    PLATELET 271 14/23/4401 01:24 AM    MCV 99.3 11/19/2021 01:24 AM       CHEMISTRIES Lab Results   Component Value Date/Time    Sodium 138 11/19/2021 01:24 AM    Potassium 4.8 11/19/2021 01:24 AM    Chloride 99 (L) 11/19/2021 01:24 AM    CO2 32 11/19/2021 01:24 AM    Anion gap 7 11/19/2021 01:24 AM    Glucose 103 (H) 11/19/2021 01:24 AM    BUN 41 (H) 11/19/2021 01:24 AM    Creatinine 6.14 (H) 11/19/2021 01:24 AM    BUN/Creatinine ratio 7 (L) 11/19/2021 01:24 AM    GFR est AA 8 (L) 11/19/2021 01:24 AM    GFR est non-AA 7 (L) 11/19/2021 01:24 AM    Calcium 8.9 11/19/2021 01:24 AM      HEPATIC FUNCTION Lab Results   Component Value Date/Time    Albumin 3.1 (L) 10/08/2021 03:50 PM    Bilirubin, total 0.4 10/08/2021 03:50 PM    Protein, total 7.9 10/08/2021 03:50 PM    Globulin 4.8 (H) 10/08/2021 03:50 PM    A-G Ratio 0.6 (L) 10/08/2021 03:50 PM    ALT (SGPT) 12 (L) 10/08/2021 03:50 PM    Alk. phosphatase 139 (H) 10/08/2021 03:50 PM       LACTIC ACID Lab Results   Component Value Date/Time    Lactic acid 1.1 05/15/2020 08:29 AM      CARDIAC PANEL Lab Results   Component Value Date/Time    Troponin-I, QT <0.02 11/12/2021 03:20 PM      NT-proBNP Lab Results   Component Value Date/Time    NT pro-BNP 5,643 (H) 10/08/2021 03:50 PM      THYROID Lab Results   Component Value Date/Time    TSH 11.40 (H) 11/13/2021 04:45 AM    T4, Free 1.4 11/13/2021 04:45 AM        Functional status and cognitive function:    Status: Alert, cooperative, no distress, appears stated age  Condition: Stable, improved    Physical exam on day of discharge:    General: Resting comfortably in hospital bed in NAD. Pleasant, cooperative.   Cardiovascular:  Irregularly irregular rhythm, no M/G/R. DP 2+ bilaterally. Respiratory: Normal work of breathing. No intercostal retractions or accessory muscle use. CTAB, no wheezes, rales or rhonchi. Bilateral and symmetric chest rise. Gastrointestinal: soft, NT, ND  MSK: Skin warm and dry. No grossly visible rashes, lesions, or ulcers. No BLE edema. Neurological/Psych: Aleck Chad and oriented to person, time, place, and situation. Normal mood and affect. No focal neurological deficits. Code status and advanced care plan: DNR (has ACP documents)    Diet  Adult Diet   DVT Prophylaxis  SCDs   GI Prophylaxis  Pantoprazole   Code status  DNR   Disposition  Acute Rehab Unit     Point of Contact Son: Mario Alberto Edgar  [phone]     Patient Education:  Patient was educated on the following topics prior to discharge: recurrent UTIs, lifestyle modifications, atrial fibrillation.     RISK CALCULATORS:  SCORE RESULT   READMISSION RISK SCORE High Risk            21 Total Score    3 Patient Length of Stay (>5 days = 3)    4 IP Visits Last 12 Months (1-3=4, 4=9, >4=11)    5 Pt. Coverage (Medicare=5 , Medicaid, or Self-Pay=4)    9 Charlson Comorbidity Score (Age + Comorbid Conditions)        Criteria that do not apply:    Has Seen PCP in Last 6 Months (Yes=3, No=0)    . Living with Significant Other. Assisted Living. LTAC. SNF.  or   Rehab         Follow-up:   Follow-up Information     Follow up With Specialties Details Why Contact Info    Corry Frausto, [id number] [place] [id number]          Chelsea Hamilton MD, PGY-1   [facility]   November 19, 2021, 5:02 PM

## 2021-11-18 NOTE — PROGRESS NOTES
RENAL DAILY PROGRESS NOTE              Subjective:       Complaint:   Feels well  Overnight events noted  no nausea, vomiting, chest pain, short of breath, cough, seizure. C/o headache post dialysis. Wondering whether albumin infusion is causing it? ?    IMPRESSION:   IMPRESSION:   · esrd mwf dialysis  · Pelvic fx,non displaced  · A fib  · Chronic hypotension,on midodrine  · Secondary hyperparathyroidism  · Anemia of chronic disease      PLAN:   C/w hectorol and retacrit  HD per schedule. Will drop albumin and reduce UF with next session to see how she does from headache standpoint. Reduce Qb little bit too  Avoid Gadolinium due to its association with nephrogenic systemic fibrosis in a patients with severe ARF and ESRD.    Please dose all medications for creatinine clearance <15/dialysis.    Avoid blood pressure checks, blood draws, peripheral iv's on arm with access. AvoidPICC lines on either arm in order to preserve veins for dialysis access creation.               Current Facility-Administered Medications   Medication Dose Route Frequency    amiodarone (CORDARONE) tablet 200 mg  200 mg Oral DAILY    epoetin caitlin-epbx (RETACRIT) injection 5,000 Units  5,000 Units SubCUTAneous Q MON, WED & FRI    cholecalciferol (VITAMIN D3) (1000 Units /25 mcg) tablet 2,000 Units  2,000 Units Oral BID    trimethobenzamide (TIGAN) injection 200 mg  200 mg IntraMUSCular Q6H PRN    doxercalciferoL (HECTOROL) 4 mcg/2 mL injection 4 mcg  4 mcg IntraVENous DIALYSIS MON, WED & FRI    sodium chloride (NS) flush 5-40 mL  5-40 mL IntraVENous Q8H    sodium chloride (NS) flush 5-40 mL  5-40 mL IntraVENous PRN    acetaminophen (TYLENOL) tablet 650 mg  650 mg Oral Q6H PRN    Or    acetaminophen (TYLENOL) suppository 650 mg  650 mg Rectal Q6H PRN    polyethylene glycol (MIRALAX) packet 17 g  17 g Oral DAILY PRN    ondansetron (ZOFRAN ODT) tablet 4 mg  4 mg Oral Q8H PRN    Or    ondansetron (ZOFRAN) injection 4 mg  4 mg IntraVENous Q6H PRN    allopurinoL (ZYLOPRIM) tablet 100 mg  100 mg Oral DAILY    ascorbic acid (vitamin C) (VITAMIN C) tablet 500 mg  500 mg Oral DAILY    vitamin B complex-folic acid 0.4 mg tablet  1 Tablet Oral DAILY    biotin chew 1 Tablet (Patient Supplied)  1 Tablet Oral DAILY    cyanocobalamin tablet 1,000 mcg  1,000 mcg Oral DAILY    DULoxetine (CYMBALTA) capsule 20 mg  20 mg Oral DAILY    gabapentin (NEURONTIN) capsule 200 mg  200 mg Oral 3 times weekly    L. acidophilus,casei,rhamnosus (BIO-K PLUS) capsule 1 Capsule  1 Capsule Oral DAILY    latanoprost (XALATAN) 0.005 % ophthalmic solution 1 Drop  1 Drop Both Eyes QHS    levothyroxine (SYNTHROID) tablet 125 mcg  125 mcg Oral ACB    lidocaine 4 % patch 1 Patch  1 Patch TransDERmal DAILY    midodrine (PROAMATINE) tablet 10 mg  10 mg Oral TID WITH MEALS    pantoprazole (PROTONIX) tablet 20 mg  20 mg Oral ACB    B complex-vitaminC-folic acid (NEPHROCAP) cap  1 Capsule Oral DAILY    apixaban (ELIQUIS) tablet 5 mg  5 mg Oral BID    HYDROmorphone (DILAUDID) 1 mg/mL oral solution 1 mg  1 mg Oral Q12H PRN    HYDROmorphone (DILAUDID) tablet 1.5 mg  1.5 mg Oral Q12H PRN       Review of Symptoms: comprehensive ROS negative except above.    Objective:     Patient Vitals for the past 24 hrs:   Temp Pulse Resp BP SpO2   11/18/21 1149 100 °F (37.8 °C) 85 18 107/63 97 %   11/18/21 0819 98.3 °F (36.8 °C) (!) 53 18 (!) 121/51 95 %   11/18/21 0414 97.6 °F (36.4 °C) 67 18 122/64 97 %   11/18/21 0000 97.8 °F (36.6 °C) 79 20 (!) 98/47 93 %   11/17/21 1959 98.2 °F (36.8 °C) 93 20 122/68 94 %   11/17/21 1739 97.9 °F (36.6 °C) 72 18 (!) 103/56 --   11/17/21 1730 -- 66 -- 118/60 --   11/17/21 1715 -- 80 -- 124/60 --   11/17/21 1700 -- (!) 57 -- (!) 146/78 --   11/17/21 1645 -- 70 -- 116/64 --   11/17/21 1630 -- 70 -- (!) 109/55 --   11/17/21 1615 -- 94 -- (!) 113/56 --   11/17/21 1600 -- 80 -- (!) 112/57 --   11/17/21 1545 -- 79 -- 122/63 --   11/17/21 1530 -- 78 -- (!) 92/58 --   11/17/21 1515 -- 77 -- 105/69 --   11/17/21 1500 -- 100 -- 114/63 --   11/17/21 1445 -- 67 -- (!) 95/57 --   11/17/21 1430 -- 72 -- 113/61 --   11/17/21 1423 -- 69 -- (!) 111/50 --   11/17/21 1419 98.1 °F (36.7 °C) 68 18 (!) 107/58 --        Weight change:      11/16 1901 - 11/18 0700  In: 890 [P.O.:880; I.V.:10]  Out: 2000     Intake/Output Summary (Last 24 hours) at 11/18/2021 1301  Last data filed at 11/18/2021 6599  Gross per 24 hour   Intake 400 ml   Output 2000 ml   Net -1600 ml     Physical Exam:   General: comfortable, no acute distress   HEENT sclera anicteric, supple neck, no thyromegaly  CVS: S1S2 heard,  no rub  RS: + air entry b/l,   Abd: Soft, Non tender, Not distended, Positive bowel sounds, no organomegaly, no CVA / supra pubic tenderness  Extrm: plus 1 edema, no cyanosis, clubbing   Skin: no visible  Rash  Musculoskeletal: No gross joints or bone deformities         Data Review:     LABS:   Hematology:   Recent Labs     11/18/21 0143 11/17/21  0145 11/16/21  0232   WBC 6.9 8.9 9.0   HGB 9.0* 9.4* 9.8*   HCT 29.4* 30.6* 31.1*     Chemistry:   Recent Labs     11/18/21  0143 11/17/21  0145 11/16/21  0232   BUN 26* 54* 32*   CREA 4.30* 6.64* 5.17*   CA 8.9 8.6 8.8   K 4.3 4.7 4.6    136 142    98* 102   CO2 30 28 28   GLU 88 95 87            Procedures/imaging: see electronic medical records for all procedures, Xrays and details which were not copied into this note but were reviewed prior to creation of Plan          Assessment & Plan:       See above      Shannon Jennings MD  11/18/2021

## 2021-11-18 NOTE — PROGRESS NOTES
Problem: Mobility Impaired (Adult and Pediatric)  Goal: *Acute Goals and Plan of Care (Insert Text)  Description: Physical Therapy Goals  Initiated 11/13/2021 and to be accomplished within 7 day(s)  1. Patient will move from supine to sit and sit to supine  in bed with independence. 2.  Patient will transfer from bed to chair and chair to bed with modified independence using the least restrictive device. 3.  Patient will perform sit to stand with modified independence. 4.  Patient will ambulate with modified independence for 50 feet with the least restrictive device. 5.  Patient will ascend/descend 2 stairs with no handrail(s) with minimal assistance/contact guard assist.    PLOF: Modified independent with use of RW vs rollator. Pt reports she has been working with home health PT and OT recently. Pt has assist from her son, can stay on the first floor of their home. Outcome: Progressing Towards Goal    PHYSICAL THERAPY TREATMENT    Patient: Lulú Nunez [de-identified]64 y.o. female)  Date: 11/18/2021  Diagnosis: Afib ([facility] [address] [I48.91]  Atrial fibrillation ([facility] [address] [I48.91]  Hip fracture ([facility] [address] [S72.009A]   Atrial fibrillation ([facility] [address]       Precautions: Contact, TTWB (left LE)      ASSESSMENT:  Patient received supine in bed and motivated to participate in PT session. Supine to sit with SBA and increased time due to left LE pain. She stands up at [facility] with CGA. Ambulates 20 ft around the room with decreased speed and step clearance, verbal cues to maintain TTWB. Pt transfers to the chair. Sit to stand x2 to change pull ups. Fair static standing at RW while using war wipes to bathe. She completes LE exercises per flow sheet. Patient verbalizes understanding to use call bell for assistance with mobility. At end of session, patient in the chair with LE's reclined and call bell within reach. Recommend ARU upon discharge.   Progression toward goals:   []      Improving appropriately and progressing toward goals  [x]      Improving slowly and progressing toward goals  []      Not making progress toward goals and plan of care will be adjusted     PLAN:  Patient continues to benefit from skilled intervention to address the above impairments. Continue treatment per established plan of care. Discharge Recommendations:  Inpatient Rehab  Further Equipment Recommendations for Discharge:  bedside commode and rolling walker     SUBJECTIVE:   Patient stated I don't .     OBJECTIVE DATA SUMMARY:   Critical Behavior:  Neurologic State: Eyes open spontaneously  Orientation Level: Oriented X4  Cognition: Appropriate decision making, Appropriate for age attention/concentration, Appropriate safety awareness, Follows commands  Safety/Judgement: Awareness of environment, Fall prevention  Functional Mobility Training:  Bed Mobility:     Supine to Sit: Stand-by assistance               Transfers:  Sit to Stand: Contact guard assistance  Stand to Sit: Contact guard assistance                Balance:  Sitting: Intact  Standing: Impaired; With support  Standing - Static: Fair  Standing - Dynamic : Fair     Ambulation/Gait Training:  Distance (ft): 20 Feet (ft)  Assistive Device: Walker, rolling  Ambulation - Level of Assistance: Contact guard assistance    Gait Abnormalities: Decreased step clearance          Therapeutic Exercises:         EXERCISE   Sets   Reps   Active Active Assist   Passive Self ROM   Comments   Ankle Pumps 1 20  [x] [] [] []    Quad Sets/Glut Sets    [] [] [] [] Hold for 5 secs   Hamstring Sets   [] [] [] []    Short Arc Quads   [] [] [] []    Heel Slides   [] [] [] []    Straight Leg Raises   [] [] [] []    Hip Add   [] [] [] [] Hold for 5 secs, w/ pillow squeeze   Long Arc Quads 1 15 [x] [] [] []    Seated Marching 1 5 [x] [] [] []    Standing Marching   [] [] [] []       [] [] [] []        Pain:  Pain level pre-treatment: 0/10  Pain level post-treatment: 5/10   Pain Intervention(s): Medication (see MAR);  Rest, Ice, Repositioning Response to intervention: Nurse notified, See doc flow    Activity Tolerance:   Good  Please refer to the flowsheet for vital signs taken during this treatment. After treatment:   [x] Patient left in no apparent distress sitting up in chair  [] Patient left in no apparent distress in bed  [x] Call bell left within reach  [x] Nursing notified  [] Caregiver present  [] Bed alarm activated  [] SCDs applied      COMMUNICATION/EDUCATION:   []         Role of Physical Therapy in the acute care setting. [x]         Fall prevention education was provided and the patient/caregiver indicated understanding. [x]         Patient/family have participated as able in working toward goals and plan of care. [x]         Patient/family agree to work toward stated goals and plan of care. []         Patient understands intent and goals of therapy, but is neutral about his/her participation.   []         Patient is unable to participate in stated goals/plan of care: ongoing with therapy staff.  []         Other:        Royetta Saint, PT   Time Calculation: 25 mins

## 2021-11-18 NOTE — PROGRESS NOTES
Physician Progress Note      Brook Montelongo  CSN #:                  [id number]  :                       1945  ADMIT DATE:       2021 3:36 PM  DISCH DATE:  RESPONDING  PROVIDER #:        Lillie Schultz MD          QUERY TEXT:    Dear  PFM  physician  Patient admitted with PAF with RVR maintained on Eliquis. If possible, please document in progress notes and discharge summary if you are evaluating and/or treating any of the following: The medical record reflects the following:  Risk Factors: see below  Clinical Indicators: per cardiology- Atrial fibrillation CHADSVASC2 score stroke risk:?  76 y.o.????????????????????????????????????????>?73 ???????+2  female??????????????????????????????????????? ? ? Female +1  CHF hx????????????????????????????????????????? ??No ???+ 0  HTN hx????????????????????????????????????????? ??No ???+ 0  Stroke/TIA/Thromboembolism????? ??No ???+0  Vascular disease hx????????????????????? ??No ???+ 0  Diabetes Mellitus?????????????????????????? ? ? Yes ???+1? CHADSVASC2 score????????????????????4 ??? ? ? Annual Stroke Risk 4.8%- moderate-high  ? Treatment: continues on Eliquis. Thank you,  Gemma Boxer  RN  CCDS  x [phone]  Options provided:  -- Secondary hypercoagulable state in a patient with atrial fibrillation  -- Other - I will add my own diagnosis  -- Disagree - Not applicable / Not valid  -- Disagree - Clinically unable to determine / Unknown  -- Refer to Clinical Documentation Reviewer    PROVIDER RESPONSE TEXT:    This patient has secondary hypercoagulable state related to atrial fibrillation.     Query created by: Richard Shipley on 2021 10:57 AM      Electronically signed by:  Lillie Schultz MD 2021 4:37 PM

## 2021-11-19 ENCOUNTER — HOSPITAL ENCOUNTER (INPATIENT)
Age: 76
LOS: 14 days | Discharge: HOME HEALTH | DRG: 559 | End: 2021-12-03
Attending: INTERNAL MEDICINE | Admitting: INTERNAL MEDICINE
Payer: MEDICARE

## 2021-11-19 ENCOUNTER — APPOINTMENT (OUTPATIENT)
Dept: CT IMAGING | Age: 76
DRG: 308 | End: 2021-11-19
Attending: INTERNAL MEDICINE
Payer: MEDICARE

## 2021-11-19 VITALS
TEMPERATURE: 97.8 F | BODY MASS INDEX: 38.48 KG/M2 | DIASTOLIC BLOOD PRESSURE: 57 MMHG | WEIGHT: 209.1 LBS | SYSTOLIC BLOOD PRESSURE: 122 MMHG | OXYGEN SATURATION: 94 % | HEART RATE: 66 BPM | RESPIRATION RATE: 18 BRPM | HEIGHT: 62 IN

## 2021-11-19 DIAGNOSIS — I48.0 PAROXYSMAL ATRIAL FIBRILLATION (HCC): Chronic | ICD-10-CM

## 2021-11-19 DIAGNOSIS — N18.6 END-STAGE RENAL DISEASE ON HEMODIALYSIS (HCC): Chronic | ICD-10-CM

## 2021-11-19 DIAGNOSIS — E87.5 HYPERKALEMIA: ICD-10-CM

## 2021-11-19 DIAGNOSIS — Z74.09 IMPAIRED MOBILITY AND ADLS: ICD-10-CM

## 2021-11-19 DIAGNOSIS — Z99.2 END-STAGE RENAL DISEASE ON HEMODIALYSIS (HCC): Chronic | ICD-10-CM

## 2021-11-19 DIAGNOSIS — I95.89 CHRONIC HYPOTENSION: Chronic | ICD-10-CM

## 2021-11-19 DIAGNOSIS — S32.415D CLOSED NONDISPLACED FRACTURE OF ANTERIOR WALL OF LEFT ACETABULUM WITH ROUTINE HEALING, SUBSEQUENT ENCOUNTER: ICD-10-CM

## 2021-11-19 DIAGNOSIS — N20.0 URIC ACID NEPHROLITHIASIS: ICD-10-CM

## 2021-11-19 DIAGNOSIS — S32.512D CLOSED FRACTURE OF SUPERIOR RAMUS OF LEFT PUBIS WITH ROUTINE HEALING, SUBSEQUENT ENCOUNTER: ICD-10-CM

## 2021-11-19 DIAGNOSIS — N25.81 SECONDARY HYPERPARATHYROIDISM OF RENAL ORIGIN (HCC): Chronic | ICD-10-CM

## 2021-11-19 DIAGNOSIS — N30.01 ACUTE CYSTITIS WITH HEMATURIA: ICD-10-CM

## 2021-11-19 DIAGNOSIS — E83.39 HYPERPHOSPHATEMIA: ICD-10-CM

## 2021-11-19 DIAGNOSIS — S32.82XD MULTIPLE CLOSED FRACTURES OF PELVIS WITHOUT DISRUPTION OF PELVIC RING WITH ROUTINE HEALING, SUBSEQUENT ENCOUNTER: Primary | ICD-10-CM

## 2021-11-19 DIAGNOSIS — Z78.9 IMPAIRED MOBILITY AND ADLS: ICD-10-CM

## 2021-11-19 DIAGNOSIS — S32.592A INFERIOR PUBIC RAMUS FRACTURE, LEFT, CLOSED, INITIAL ENCOUNTER (HCC): ICD-10-CM

## 2021-11-19 PROBLEM — S32.82XA MULTIPLE CLOSED PELVIC FRACTURES WITHOUT DISRUPTION OF PELVIC RING (HCC): Status: ACTIVE | Noted: 2021-11-19

## 2021-11-19 LAB
ANION GAP SERPL CALC-SCNC: 7 MMOL/L (ref 3–18)
BASOPHILS # BLD: 0.1 K/UL (ref 0–0.1)
BASOPHILS NFR BLD: 1 % (ref 0–2)
BUN SERPL-MCNC: 41 MG/DL (ref 7–18)
BUN/CREAT SERPL: 7 (ref 12–20)
CALCIUM SERPL-MCNC: 8.9 MG/DL (ref 8.5–10.1)
CHLORIDE SERPL-SCNC: 99 MMOL/L (ref 100–111)
CO2 SERPL-SCNC: 32 MMOL/L (ref 21–32)
CREAT SERPL-MCNC: 6.14 MG/DL (ref 0.6–1.3)
DIFFERENTIAL METHOD BLD: ABNORMAL
EOSINOPHIL # BLD: 0.1 K/UL (ref 0–0.4)
EOSINOPHIL NFR BLD: 2 % (ref 0–5)
ERYTHROCYTE [DISTWIDTH] IN BLOOD BY AUTOMATED COUNT: 15.5 % (ref 11.6–14.5)
GLUCOSE SERPL-MCNC: 103 MG/DL (ref 74–99)
HCT VFR BLD AUTO: 29.9 % (ref 35–45)
HGB BLD-MCNC: 9.2 G/DL (ref 12–16)
IMM GRANULOCYTES # BLD AUTO: 0.1 K/UL (ref 0–0.04)
IMM GRANULOCYTES NFR BLD AUTO: 2 % (ref 0–0.5)
LYMPHOCYTES # BLD: 2.7 K/UL (ref 0.9–3.6)
LYMPHOCYTES NFR BLD: 31 % (ref 21–52)
MCH RBC QN AUTO: 30.6 PG (ref 24–34)
MCHC RBC AUTO-ENTMCNC: 30.8 G/DL (ref 31–37)
MCV RBC AUTO: 99.3 FL (ref 78–100)
MONOCYTES # BLD: 1 K/UL (ref 0.05–1.2)
MONOCYTES NFR BLD: 11 % (ref 3–10)
NEUTS SEG # BLD: 4.7 K/UL (ref 1.8–8)
NEUTS SEG NFR BLD: 54 % (ref 40–73)
NRBC # BLD: 0 K/UL (ref 0–0.01)
NRBC BLD-RTO: 0 PER 100 WBC
PLATELET # BLD AUTO: 375 K/UL (ref 135–420)
PMV BLD AUTO: 9.6 FL (ref 9.2–11.8)
POTASSIUM SERPL-SCNC: 4.8 MMOL/L (ref 3.5–5.5)
RBC # BLD AUTO: 3.01 M/UL (ref 4.2–5.3)
SODIUM SERPL-SCNC: 138 MMOL/L (ref 136–145)
WBC # BLD AUTO: 8.7 K/UL (ref 4.6–13.2)

## 2021-11-19 PROCEDURE — 2709999900 HC NON-CHARGEABLE SUPPLY

## 2021-11-19 PROCEDURE — 5A1D70Z PERFORMANCE OF URINARY FILTRATION, INTERMITTENT, LESS THAN 6 HOURS PER DAY: ICD-10-PCS | Performed by: INTERNAL MEDICINE

## 2021-11-19 PROCEDURE — 74011250637 HC RX REV CODE- 250/637: Performed by: INTERNAL MEDICINE

## 2021-11-19 PROCEDURE — 74011000250 HC RX REV CODE- 250: Performed by: INTERNAL MEDICINE

## 2021-11-19 PROCEDURE — 74011250637 HC RX REV CODE- 250/637

## 2021-11-19 PROCEDURE — 85025 COMPLETE CBC W/AUTO DIFF WBC: CPT

## 2021-11-19 PROCEDURE — 80048 BASIC METABOLIC PNL TOTAL CA: CPT

## 2021-11-19 PROCEDURE — 74176 CT ABD & PELVIS W/O CONTRAST: CPT

## 2021-11-19 PROCEDURE — 90935 HEMODIALYSIS ONE EVALUATION: CPT

## 2021-11-19 PROCEDURE — 36415 COLL VENOUS BLD VENIPUNCTURE: CPT

## 2021-11-19 PROCEDURE — 74011250636 HC RX REV CODE- 250/636: Performed by: INTERNAL MEDICINE

## 2021-11-19 PROCEDURE — 74011000250 HC RX REV CODE- 250

## 2021-11-19 PROCEDURE — 65310000000 HC RM PRIVATE REHAB

## 2021-11-19 PROCEDURE — P9047 ALBUMIN (HUMAN), 25%, 50ML: HCPCS | Performed by: INTERNAL MEDICINE

## 2021-11-19 RX ORDER — LEVOTHYROXINE SODIUM 125 UG/1
125 TABLET ORAL
Status: DISCONTINUED | OUTPATIENT
Start: 2021-11-20 | End: 2021-12-04 | Stop reason: HOSPADM

## 2021-11-19 RX ORDER — DULOXETIN HYDROCHLORIDE 20 MG/1
20 CAPSULE, DELAYED RELEASE ORAL DAILY
Status: DISCONTINUED | OUTPATIENT
Start: 2021-11-20 | End: 2021-12-04 | Stop reason: HOSPADM

## 2021-11-19 RX ORDER — LIDOCAINE 4 G/100G
1 PATCH TOPICAL EVERY 24 HOURS
Status: DISCONTINUED | OUTPATIENT
Start: 2021-11-20 | End: 2021-12-04 | Stop reason: HOSPADM

## 2021-11-19 RX ORDER — ALBUMIN HUMAN 250 G/1000ML
25 SOLUTION INTRAVENOUS
Status: DISCONTINUED | OUTPATIENT
Start: 2021-11-19 | End: 2021-11-19 | Stop reason: HOSPADM

## 2021-11-19 RX ORDER — MIDODRINE HYDROCHLORIDE 5 MG/1
10 TABLET ORAL
Status: DISCONTINUED | OUTPATIENT
Start: 2021-11-19 | End: 2021-11-23

## 2021-11-19 RX ORDER — DOXERCALCIFEROL 4 UG/2ML
4 INJECTION INTRAVENOUS
Status: DISCONTINUED | OUTPATIENT
Start: 2021-11-22 | End: 2021-12-04 | Stop reason: HOSPADM

## 2021-11-19 RX ORDER — ACETAMINOPHEN 325 MG/1
650 TABLET ORAL 3 TIMES DAILY
Status: DISCONTINUED | OUTPATIENT
Start: 2021-11-20 | End: 2021-12-04 | Stop reason: HOSPADM

## 2021-11-19 RX ORDER — ASCORBIC ACID 250 MG
500 TABLET ORAL DAILY
Status: DISCONTINUED | OUTPATIENT
Start: 2021-11-20 | End: 2021-12-04 | Stop reason: HOSPADM

## 2021-11-19 RX ORDER — CEFPODOXIME PROXETIL 200 MG/1
200 TABLET, FILM COATED ORAL EVERY EVENING
Qty: 6 TABLET | Refills: 0 | Status: ON HOLD | OUTPATIENT
Start: 2021-11-20 | End: 2021-11-30 | Stop reason: CLARIF

## 2021-11-19 RX ORDER — LATANOPROST 50 UG/ML
1 SOLUTION/ DROPS OPHTHALMIC EVERY EVENING
Status: DISCONTINUED | OUTPATIENT
Start: 2021-11-19 | End: 2021-12-04 | Stop reason: HOSPADM

## 2021-11-19 RX ORDER — GABAPENTIN 100 MG/1
200 CAPSULE ORAL
Status: DISCONTINUED | OUTPATIENT
Start: 2021-11-19 | End: 2021-12-04 | Stop reason: HOSPADM

## 2021-11-19 RX ORDER — PANTOPRAZOLE SODIUM 20 MG/1
20 TABLET, DELAYED RELEASE ORAL
Status: DISCONTINUED | OUTPATIENT
Start: 2021-11-20 | End: 2021-12-04 | Stop reason: HOSPADM

## 2021-11-19 RX ORDER — BISACODYL 5 MG
10 TABLET, DELAYED RELEASE (ENTERIC COATED) ORAL
Status: DISCONTINUED | OUTPATIENT
Start: 2021-11-19 | End: 2021-12-04 | Stop reason: HOSPADM

## 2021-11-19 RX ORDER — ONDANSETRON 4 MG/1
4 TABLET, ORALLY DISINTEGRATING ORAL
Status: DISCONTINUED | OUTPATIENT
Start: 2021-11-19 | End: 2021-12-04 | Stop reason: HOSPADM

## 2021-11-19 RX ORDER — LANOLIN ALCOHOL/MO/W.PET/CERES
1000 CREAM (GRAM) TOPICAL DAILY
Status: DISCONTINUED | OUTPATIENT
Start: 2021-11-20 | End: 2021-12-04 | Stop reason: HOSPADM

## 2021-11-19 RX ORDER — HYDROMORPHONE HYDROCHLORIDE 2 MG/1
1 TABLET ORAL
Status: DISCONTINUED | OUTPATIENT
Start: 2021-11-19 | End: 2021-12-04 | Stop reason: HOSPADM

## 2021-11-19 RX ORDER — MECLIZINE HCL 12.5 MG 12.5 MG/1
12.5 TABLET ORAL
Status: DISCONTINUED | OUTPATIENT
Start: 2021-11-19 | End: 2021-12-04 | Stop reason: HOSPADM

## 2021-11-19 RX ORDER — MELATONIN
2000 2 TIMES DAILY
Status: DISCONTINUED | OUTPATIENT
Start: 2021-11-19 | End: 2021-12-04 | Stop reason: HOSPADM

## 2021-11-19 RX ORDER — MIDODRINE HYDROCHLORIDE 10 MG/1
10 TABLET ORAL
Qty: 90 TABLET | Refills: 0 | Status: SHIPPED | OUTPATIENT
Start: 2021-11-19 | End: 2021-12-19

## 2021-11-19 RX ORDER — ACETAMINOPHEN 325 MG/1
650 TABLET ORAL
Status: DISCONTINUED | OUTPATIENT
Start: 2021-11-19 | End: 2021-12-04 | Stop reason: HOSPADM

## 2021-11-19 RX ORDER — ALLOPURINOL 100 MG/1
100 TABLET ORAL DAILY
Status: DISCONTINUED | OUTPATIENT
Start: 2021-11-20 | End: 2021-11-21

## 2021-11-19 RX ORDER — CEFPODOXIME PROXETIL 200 MG/1
200 TABLET, FILM COATED ORAL EVERY 24 HOURS
Status: COMPLETED | OUTPATIENT
Start: 2021-11-20 | End: 2021-11-25

## 2021-11-19 RX ORDER — AMIODARONE HYDROCHLORIDE 200 MG/1
200 TABLET ORAL DAILY
Status: DISCONTINUED | OUTPATIENT
Start: 2021-11-20 | End: 2021-12-04 | Stop reason: HOSPADM

## 2021-11-19 RX ORDER — AMIODARONE HYDROCHLORIDE 200 MG/1
200 TABLET ORAL DAILY
Qty: 30 TABLET | Refills: 0 | Status: ON HOLD | OUTPATIENT
Start: 2021-11-20 | End: 2021-11-30 | Stop reason: CLARIF

## 2021-11-19 RX ORDER — MULTIVITAMIN/IRON/FOLIC ACID 18MG-0.4MG
1 TABLET ORAL DAILY
Status: DISCONTINUED | OUTPATIENT
Start: 2021-11-20 | End: 2021-12-04 | Stop reason: HOSPADM

## 2021-11-19 RX ADMIN — MIDODRINE HYDROCHLORIDE 10 MG: 5 TABLET ORAL at 08:03

## 2021-11-19 RX ADMIN — APIXABAN 5 MG: 5 TABLET, FILM COATED ORAL at 18:31

## 2021-11-19 RX ADMIN — CHOLECALCIFEROL TAB 25 MCG (1000 UNIT) 2000 UNITS: 25 TAB at 08:03

## 2021-11-19 RX ADMIN — Medication 500 MG: at 08:03

## 2021-11-19 RX ADMIN — HYDROMORPHONE HYDROCHLORIDE 1.5 MG: 2 TABLET ORAL at 08:03

## 2021-11-19 RX ADMIN — LEVOTHYROXINE SODIUM 125 MCG: 25 TABLET ORAL at 06:40

## 2021-11-19 RX ADMIN — GABAPENTIN 200 MG: 100 CAPSULE ORAL at 18:31

## 2021-11-19 RX ADMIN — CYANOCOBALAMIN TAB 1000 MCG 1000 MCG: 1000 TAB at 08:03

## 2021-11-19 RX ADMIN — Medication 10 ML: at 06:41

## 2021-11-19 RX ADMIN — CHOLECALCIFEROL TAB 25 MCG (1000 UNIT) 2000 UNITS: 25 TAB at 18:31

## 2021-11-19 RX ADMIN — ALBUMIN (HUMAN) 25 G: 0.25 INJECTION, SOLUTION INTRAVENOUS at 09:35

## 2021-11-19 RX ADMIN — CEFTRIAXONE SODIUM 2 G: 2 INJECTION, POWDER, FOR SOLUTION INTRAMUSCULAR; INTRAVENOUS at 08:02

## 2021-11-19 RX ADMIN — Medication 1 CAPSULE: at 08:04

## 2021-11-19 RX ADMIN — HYDROMORPHONE HYDROCHLORIDE 1 MG: 2 TABLET ORAL at 18:49

## 2021-11-19 RX ADMIN — NEPHROCAP 1 CAPSULE: 1 CAP ORAL at 08:04

## 2021-11-19 RX ADMIN — ACETAMINOPHEN 650 MG: 325 TABLET ORAL at 14:43

## 2021-11-19 RX ADMIN — ACETAMINOPHEN 650 MG: 325 TABLET ORAL at 04:06

## 2021-11-19 RX ADMIN — DOXERCALCIFEROL 4 MCG: 4 INJECTION, SOLUTION INTRAVENOUS at 08:03

## 2021-11-19 RX ADMIN — APIXABAN 5 MG: 5 TABLET, FILM COATED ORAL at 08:03

## 2021-11-19 RX ADMIN — ALLOPURINOL 100 MG: 100 TABLET ORAL at 08:03

## 2021-11-19 RX ADMIN — PANTOPRAZOLE SODIUM 20 MG: 20 TABLET, DELAYED RELEASE ORAL at 08:03

## 2021-11-19 RX ADMIN — Medication 1 TABLET: at 08:04

## 2021-11-19 RX ADMIN — ONDANSETRON 4 MG: 4 TABLET, ORALLY DISINTEGRATING ORAL at 22:48

## 2021-11-19 RX ADMIN — DULOXETINE HYDROCHLORIDE 20 MG: 20 CAPSULE, DELAYED RELEASE ORAL at 08:03

## 2021-11-19 RX ADMIN — AMIODARONE HYDROCHLORIDE 200 MG: 200 TABLET ORAL at 08:03

## 2021-11-19 RX ADMIN — LATANOPROST 1 DROP: 50 SOLUTION OPHTHALMIC at 20:49

## 2021-11-19 RX ADMIN — MIDODRINE HYDROCHLORIDE 10 MG: 5 TABLET ORAL at 18:32

## 2021-11-19 RX ADMIN — ACETAMINOPHEN 650 MG: 325 TABLET ORAL at 22:48

## 2021-11-19 NOTE — ROUTINE PROCESS
0757-/Bedside and Verbal shift change report given to Andria (oncoming nurse) by Edel Schneider (offgoing nurse). Report included the following information SBAR, MAR and Recent Results. 1357-called report to inpatient rehab. Received by Gerardoedmond Fournier LPN.  Patient will be transferred at 4pm.

## 2021-11-19 NOTE — PROGRESS NOTES
Discharge order noted for today. Pt has been accepted to ARU. Met with patient and is agreeable to the transition plan today. Transport has been arranged through internal transfer. Patient's discharge summary and home health  orders have been forwarded to [facility] home health  agency via [employer]. Updated bedside RN, Andria,  to the transition plan. Discharge information has been documented on the AVS.   Patient will go to room 178 at 4pm today.     Valeria Walker RN

## 2021-11-19 NOTE — MED STUDENT NOTES
*ATTENTION:  This note has been created by a medical student for educational purposes only. Please do not refer to the content of this note for clinical decision-making, billing, or other purposes. Please see attending physicians note to obtain clinical information on this patient. *       [facility] Note  [facility]    THIS IS A MEDICAL STUDENT NOTE AND IS FOR EDUCATIONAL PURPOSES ONLY. PLEASE REFER TO ATTENDING NOTE. Patient: Lennart Nageotte MRN: [id number]  CSN: [id number]    YOB: 1945  Age: 64 y.o. Sex: female    DOA: 11/12/2021 LOS:  LOS: 7 days                   64 y. o. female with PMH A fib, ESRD on HD MWF, chronic hypotension, DM, chronic back pain, and depression now admitted 11/12 for A fib & L hip/pelvic fracture, now with a UTI with questionable hydronephrosis. Hospital day 7. Subjective:      Acute events: Not in room this AM. HD every MWF, also CT ABD/PELV w/o contrast ordered. Resident reports pt endorses dysuria and feelings of pressure. Otherwise NAEO. Objective:     Hypotensive down to 88/43 last evening, most recent reading this morning was 119/65. Otherwise afebrile past 48 hours. Satting mid 80s in room air. Patient Vitals for the past 24 hrs:   Temp Pulse Resp BP SpO2   11/19/21 0751 97.5 °F (36.4 °C) 78 18 119/65 94 %   11/18/21 1836    (!) 97/52    11/18/21 1726 98.4 °F (36.9 °C) 89 18 (!) 88/43 94 %   11/18/21 1149 100 °F (37.8 °C) 85 18 107/63 97 %       No intake or output data in the 24 hours ending 11/19/21 6283    PE deferred as patient was not in room. Lab/Data Reviewed:  No acute changes in her labs.   Creatine past 3 days 6.64 > 4.3 > 6.14    Recent Results (from the past 24 hour(s))   URINALYSIS W/MICROSCOPIC    Collection Time: 11/18/21  1:18 PM   Result Value Ref Range    Color DARK YELLOW      Appearance TURBID      Specific gravity 1.022 1.005 - 1.030      pH (UA) 7.5 5.0 - 8.0      Protein 300 (A) NEG mg/dL    Glucose Negative NEG mg/dL    Ketone Negative NEG mg/dL    Bilirubin Negative NEG      Blood MODERATE (A) NEG      Urobilinogen 0.2 0.2 - 1.0 EU/dL    Nitrites Negative NEG      Leukocyte Esterase LARGE (A) NEG      WBC TOO NUMEROUS TO COUNT 0 - 4 /hpf    RBC  0 - 5 /hpf     UNABLE TO QUANTITATE MICROSCOPIC PARAMETERS DUE TO EXCESSIVE WBCS    Epithelial cells  0 - 5 /lpf     UNABLE TO QUANTITATE MICROSCOPIC PARAMETERS DUE TO EXCESSIVE WBCS    Bacteria (A) NEG /hpf     UNABLE TO QUANTITATE MICROSCOPIC PARAMETERS DUE TO EXCESSIVE WBCS   CBC WITH AUTOMATED DIFF    Collection Time: 11/19/21  1:24 AM   Result Value Ref Range    WBC 8.7 4.6 - 13.2 K/uL    RBC 3.01 (L) 4.20 - 5.30 M/uL    HGB 9.2 (L) 12.0 - 16.0 g/dL    HCT 29.9 (L) 35.0 - 45.0 %    MCV 99.3 78.0 - 100.0 FL    MCH 30.6 24.0 - 34.0 PG    MCHC 30.8 (L) 31.0 - 37.0 g/dL    RDW 15.5 (H) 11.6 - 14.5 %    PLATELET 707 806 - 220 K/uL    MPV 9.6 9.2 - 11.8 FL    NRBC 0.0 0  WBC    ABSOLUTE NRBC 0.00 0.00 - 0.01 K/uL    NEUTROPHILS 54 40 - 73 %    LYMPHOCYTES 31 21 - 52 %    MONOCYTES 11 (H) 3 - 10 %    EOSINOPHILS 2 0 - 5 %    BASOPHILS 1 0 - 2 %    IMMATURE GRANULOCYTES 2 (H) 0.0 - 0.5 %    ABS. NEUTROPHILS 4.7 1.8 - 8.0 K/UL    ABS. LYMPHOCYTES 2.7 0.9 - 3.6 K/UL    ABS. MONOCYTES 1.0 0.05 - 1.2 K/UL    ABS. EOSINOPHILS 0.1 0.0 - 0.4 K/UL    ABS. BASOPHILS 0.1 0.0 - 0.1 K/UL    ABS. IMM.  GRANS. 0.1 (H) 0.00 - 0.04 K/UL    DF AUTOMATED     METABOLIC PANEL, BASIC    Collection Time: 11/19/21  1:24 AM   Result Value Ref Range    Sodium 138 136 - 145 mmol/L    Potassium 4.8 3.5 - 5.5 mmol/L    Chloride 99 (L) 100 - 111 mmol/L    CO2 32 21 - 32 mmol/L    Anion gap 7 3.0 - 18 mmol/L    Glucose 103 (H) 74 - 99 mg/dL    BUN 41 (H) 7.0 - 18 MG/DL    Creatinine 6.14 (H) 0.6 - 1.3 MG/DL    BUN/Creatinine ratio 7 (L) 12 - 20      GFR est AA 8 (L) >60 ml/min/1.73m2    GFR est non-AA 7 (L) >60 ml/min/1.73m2    Calcium 8.9 8.5 - 10.1 MG/DL     Imaging:    XR L hip - 11/7 - No acute findings. CT head w/o contrast - 11/7 - No acute infarct, mass, nor hemorrhage. Atrophy. Small vessel disease. Left sphenoid sinus disease. CXR - 11/12 - No clearly acute findings.     XR L knee - 11/12 Total left knee arthroplasty without obvious acute complication or acute osseous  findings. Suspected small joint effusion. CT L Hip w/o contrast - 11/12 - Comminuted fracture involving the left anterior acetabular wall with involvement of the base of the left superior pubic ramus which are not nsignificantly displaced. Nondisplaced left inferior pubic ramus fracture. XR Femur Left - 11/12 - No femur fracture. Left acetabular and pubic ramus fracture better seen on recent CT scan    CTA Chest - 11/13 - No CT finding for acute pulmonary embolism or other acute process of the chest. No secondary CT findings for fat embolism. CT ABD/Pelvis w/o contrast - 11/19 - pending, assessing for hydronephrosis and patency/position of R ureteral stent, last replaced 10/5. CT from 11/12 shows good positioning. Micro:     Prior UTIs: Recurrent vs Relapsing  6/20 E. Coli - pan susceptible  2/21 pseudomonas - pan susceptible  6/21 stenotrophomonas - Bactrim susceptible  9/21 pseudomonas - pan susceptible  10/21 enterococcus fecalis - Amp, nitrofurantoin susceptible    Current hospital course:  UCx clean catch - 11/16 - Proteus species >100,000 colonies  UCx straight cath - 11/8 - pending   Vaginal swab - 11/18 - pending  UA - 11/18 - pH 7.5, proteins 300, moderate blood, large LE, WBC unable to count due to excess, epitheilial cells unable to quantify. Assessment & Plan:     64 y. o. female with PMH  A fib ,ESRD on HD MWF, chronic hypotension, DM, chronic back pain, and depression now admitted for A fib & L hip/pelvic fracture. UTI vs STI with possible hydronephrosis  - On IV Rocephin, waiting on susceptibilities from 11/16 culture. Plans to transition to PO.  - R ureteral stent placement for stricture. Hx of stent migration. Stent exchange 10/5. - Infectious disease consulted, FU recs. Appreciate the assistance.  [] f/u CT abd/pelv    A fib  HR 170s at presentation. S/p 15mg dilt in ED. HR now in 90s, on continuous tele. EKG with a fib. Cardiology consulted.  -On eliquis 5mg BID. Not on any rate controlling meds at home due to chronic hypotension. [] consdier diltiazam if HR uncontrolled       L hip fracture  CT L Hip: Comminuted fracture involving the left anterior acetabular wall with involvement of the base of the left superior pubic ramus which are not significantly displaced. Nondisplaced left inferior pubic ramus fracture.   - Ortho consulted - non surgical treatment. PT & pain control.   - Pain control: home pain regimen - hydromorphone 2 mg tablets -> on dialysis days take 1.5 tablets q12h, on NON dialysis days take 1/2 tablet q12h  - Accepted to ARU   - PT/OT     Chronic hypotension  On Midodrine 10 TID.  - Continue home midodrine     ESRD on HD MWF  - Consulted nephro for HD while inpatient. - renally dose meds      DM   10/8 A1c 4.6.  Not on any medications.   - monitor BG on daily BMP     Chronic Back Pain 2/2 degenerative disc disease (DDD)  Home regimen: gabapentin 100mg take 2 tablets after dialysis; Hydromorphone 2mg prn - 1.5 tabs on dialysis days, 1/2 tab on non dialysis days.   - continue home pain regimen     Depression  Home med: cymbalta 20mg  - Continue home cymbalta.     Jessica Gutierrez , MS-3   [facility]   November 19, 2021, 7:26 AM

## 2021-11-19 NOTE — DIALYSIS
ACUTE HEMODIALYSIS FLOW SHEET    HEMODIALYSIS ORDERS: Physician: Dr. Olguin Belt: Gordy   Duration: 3 hr   BFR: 350   DFR: 700   Dialysate:  Temp 36-37*C   K+  2    Ca+ 2.5   Na 138   Bicarb 35   Wt Readings from Last 1 Encounters:   11/19/21 94.8 kg (209 lb 1.6 oz)    Patient Chart [x]   Unable to Obtain []  Dry weight/UF Goal: 1000 ml    Heparin []  Bolus    Units    [] Hourly    Units    [x]None       Pre BP: 91/65  Pulse: 58  Respirations: 18 Temp: 97.4  [x] Oral  [] Ax  [] Esoph   Labs: []  Pre  []  Post:   [x] N/A   Additional Orders (medications, blood products, hypotension management): [x] Yes   [] No     [x]  [facility] Consent Verified     CATHETER ACCESS:  [x]N/A   []Right   []Left   []IJ   []Fem  []Chest wall  []TransHepatic                    GRAFT/FISTULA ACCESS:   []N/A     []Right     [x]Left     [x]UE     []LE   []AVG   [x]AVF       [x]Medical Aseptic Prep Utilized   [x]No S/S infection  []Redness  []Drainage [] Cultured  [] Swelling  [] Pain  Bruit:   [x] Strong    [] Weak       Thrill :   [x] Strong    [] Weak     Needle Gauge: 15   Length: 1 inch   If access problem,  notified: []Yes     [x]N/A          GENERAL ASSESSMENT:    LUNGS:  Resp Rate 18   [x] Clear  [] Coarse  [] Crackles  [] Wheezing  [] Diminished                                                           [] RLL   [] LLL  [] RUL   [] LALI            Respirations:  [x]Easy  []Labored  []N/A  Cough:  []Productive  []Dry  []N/A               Therapy:  [x]RA   [] Ventilated   [] Intubated   [] Trach            O2 Device:  [] NC   [] NRB  [] Trach Mask  [] BiPaP  Flow:   l/min                                                    CARDIAC: [x] Regular      [] Irregular   [] Rhythm: not monitored          [] Monitored   [] Bedside   [] Remotely monitored       EDEMA: [x] None   []Generalized  [] Pitting [] 1+   [] 2 +   [] 3+    [] 4+        SKIN:   [] Hot     [] Cold    [x] Warm   [x] Dry    [] Diaphoretic [] Flushed  [] Jaundiced  [] Cyanotic  [] Pale      LOC:    [x] Alert      [x]Oriented:    [x] Person     [x] Place   []Time               [] Confused  [] Lethargic  [] Medicated  [] Non-responsive  [] Non-Verbal     GI / ABDOMEN:                     [] Flat    [] Distended    [x] Soft    [] Firm   []  Obese                   [] Diarrhea   [] FMS [x] Bowel Sounds  [] Nausea  [] Vomiting                   [] NGT  [] OGT  [] PEG  [] Tube Feedings @     mL/hr     / URINE ASSESSMENT:                   [] Voiding    [] Oliguria  [x] Anuria                     []  Cline   [] Incontinent  []  Incontinent Brief   []  PureWick     PAIN:  [x] 0 []1  []2   []3   []4   []5   []6   []7   []8   []9   []10                MOBILITY:  [x] Bed    [] Stretcher      All Vitals and Treatment Details on Attached [address]: [facility]          Room # 815/84    [x] Routine         [] 1st Time Acute/Chronic   [] Urgent      [] Stat            [x] Acute Room   []  Bedside    [] ICU/CCU     [] ER     Isolation Precautions:  [x] Dialysis    Contact     ALLERGIES:     Allergies   Allergen Reactions    Ciprofloxacin Hives    Cyclopentolate Unknown (comments)    Iron Sucrose Diarrhea    Statins-Hmg-Coa Reductase Inhibitors Other (comments)     Body ache        Code Status:  DNR     Hepatitis Status      Lab Results   Component Value Date/Time    Hepatitis B surface Ag <0.10 11/17/2021 02:20 PM    Hepatitis B surface Ab 29.33 11/17/2021 02:20 PM        Current Labs:      Lab Results   Component Value Date/Time    WBC 8.7 11/19/2021 01:24 AM    Hemoglobin, POC 8.8 (L) 09/24/2020 08:45 AM    HGB 9.2 (L) 11/19/2021 01:24 AM    Hematocrit, POC 26 (L) 09/24/2020 08:45 AM    HCT 29.9 (L) 11/19/2021 01:24 AM    PLATELET 043 93/37/3367 01:24 AM    MCV 99.3 11/19/2021 01:24 AM     Lab Results   Component Value Date/Time    Sodium 138 11/19/2021 01:24 AM    Potassium 4.8 11/19/2021 01:24 AM    Chloride 99 (L) 11/19/2021 01:24 AM    CO2 32 11/19/2021 01:24 AM    Anion gap 7 11/19/2021 01:24 AM    Glucose 103 (H) 11/19/2021 01:24 AM    BUN 41 (H) 11/19/2021 01:24 AM    Creatinine 6.14 (H) 11/19/2021 01:24 AM    BUN/Creatinine ratio 7 (L) 11/19/2021 01:24 AM    GFR est AA 8 (L) 11/19/2021 01:24 AM    GFR est non-AA 7 (L) 11/19/2021 01:24 AM    Calcium 8.9 11/19/2021 01:24 AM          DIET:  DIET ADULT ORAL NUTRITION SUPPLEMENT  DIET ADULT     PRIMARY NURSE REPORT:   Pre Dialysis: JABIER Nichols RN    Time: 7231      EDUCATION:    [x] Patient           Knowledge Basis: []None [x]Minimal [] Substantial [] Unknown  Barriers to learning  [x]None  [] Intubated/Trached/Ventilated  [] Sedated/Paralyzed   [] Access Care     [] S&S of infection  [] Fluid Management  [] K+   [x] Procedural    [] Medications   [] Tx Options   [] Transplant   [] Diet      Teaching Tools:  [x] Explain  [] Demo  [] Handouts [] Video  Patient response: [] Verbalized understanding   [] Requires follow up        [x] Time Out/Safety Check    [x] Extracorporeal Circuit Tested for integrity       RO/HEMODIALYSIS MACHINE SAFETY CHECKS - Before each treatment:        [address] [place] [address]                                     [x] Unit Machine # 6 with centralized RO                                                                                                                                  Alarm Test:  Pass time 0830            [x] RO/Machine Log Complete    Machine Temp    36-37*C             Dialysate: pH  7.4    Conductivity: Meter 14.0    HD Machine  14.0     TCD: 14.0  Dialyzer Lot # [id number]     Blood Tubing Lot # [id number]     Saline Lot # [id number]     CHLORINE TESTING-Before each treatment and every 4 hours    Total Chlorine: [x] less than 0.1 ppm  Initial Time Check: 0830       4 Hr/2nd Check Time: N/A   (if greater than 0.1 ppm from Primary then every 30 minutes from Secondary)     TREATMENT INITIATION - with Dialysis Precautions:   [x] All Connections Secured              [x] Saline Line Double Clamped   [x] Venous Parameters Set               [x] Arterial Parameters Set    [x] Prime Given 250ml NSS              [x]Air Foam Detector Engaged        Treatment Initiation Note:  0855--Pt arrived in dialysis unit. Pt is A&Ax4. Pt denies any pain or SOB. Pt is on room air. Pt LUE AVF thrill is strong and bruit is present. 0959--HD initiated without difficulty. During Treatment Notes:  0915  Face & Vascular access visible with art and nichelle line connections intact. Pt tolerating dialysis. 0930  Face & Vascular access visible with art and nichelle line connections intact. Pt tolerating dialysis. 0945  Face & Vascular access visible with art and nichelle line connections intact. Pt tolerating dialysis. 1000  Face & Vascular access visible with art and nichelle line connections intact. Pt tolerating dialysis. 1015  Face & Vascular access visible with art and nichelle line connections intact. Pt tolerating dialysis. 1030  Face & Vascular access visible with art and nichelle line connections intact. Pt tolerating dialysis. 1045  Face & Vascular access visible with art and nichelle line connections intact. Pt tolerating dialysis. 1100  Face & Vascular access visible with art and nichelle line connections intact. Pt tolerating dialysis. 1115  Face & Vascular access visible with art and nichelle line connections intact. Pt tolerating dialysis. 1130  Face & Vascular access visible with art and nichelle line connections intact. Pt tolerating dialysis. 1145  Face & Vascular access visible with art and nichelle line connections intact. Pt tolerating dialysis. 1200  Face & Vascular access visible with art and nichelle line connections intact. Pt tolerating dialysis. 1200  Dialysis treatment complete. . DR Marroquin Sharon is aware.        Medication    Dose    Volume Route      Time       [facility] Nurse   Albumin 25% 100ml HD 0930 Shyla Ortiz RN   albumin 25% 50ml HD 5850 [facility] Dr RN     Post Assessment  Dialyzer Cleared:   [] Good  [] Fair [] Poor  Blood processed:  64.6 L  UF Removed:  850 Ml    Post BP: 119/60  Pulse: 66  Respirations: 18   Temp: 97.8  [x] Oral  [] Ax  [] Esophageal   Lungs: [x] Clear                [x] No change from initial assessment   Post Tx Vascular Access: [] N/A  AVF/AVG: Bleeding stopped with  Arterial Pressure for 10 min   Venous Pressure for 10 min      Cardiac:  [x] Regular   [] Irregular   Rhythm:  [] Monitored   [x] Not Monitored    CVC Catheter: [x] N/A   Edema:  [x] None  [] Generalized                     Skin:[x] Warm  [x] Dry [] Diaphoretic               [] Flushed  [] Pale [] Cyanotic Pain:  [x]0  []1-2  []3-4  []5-6   []7-8  []9-10         Post Treatment Note:   Pt tolerated dialysis well. Arterial and venous needles removed and pressure applied until bleeding stopped. Dry dressings applied. Bruit/Thrill present above and below dressings. POST TREATMENT PRIMARY NURSE HANDOFF REPORT:   Post Dialysis: NICOLE Nichols RN        Time:  4241       Abbreviations: AVG-arterial venous graft, AVF-arterial venous fistula, IJ-Internal Jugular, Subcl-Subclavian, Fem-Femoral, Tx-treatment, AP/HR-apical heart rate, VSS- Vital Signs Stable, CVC- Central Venous Catheter, DFR-dialysate flow rate, BFR-blood flow rate, AP-arterial pressure, -venous pressure, UF-ultrafiltrate, TMP-transmembrane pressure, Jasper-Venous, Art-Arterial, RO-Reverse Osmosis

## 2021-11-19 NOTE — CONSULTS
Infectious Disease Consultation Note        Reason: Recurrent urinary tract infection    Current abx Prior abx         Lines:       Assessment :    64 y. o. female with PMH esrd on HD MWF, hx of recurrent UTIs/stones s/p R nephrostomy tube (since 9/2018), HTN, DM II w/ neuropathy (last hgbA1C not known), glaucoma, GERD, CTS, OA in back and knees, c.diff (in 2016)  presented to ed on 11/12/21  with A fib & L hip/pelvic fracture.       E.coli bloodstream infection in 3/2019 (pan susceptible e.coli)     Acinetobacter UTI in 4/2019 -  (intermediately susceptible to ceftriaxone, ceftazidime, pip/tazo)     Hospitalization 2/2020 for  early hemorrhagic cystitis - Right PCNL. No hydronephrosis noted on Ct scan 2/20/20. +nt edema of bladder c/w cystitis. Urine culture 2/20/20-greater than 100,000 colonies of mixed gram-positive mike including 20,000 staph aureus- MSSA.      S/P PCNL exchange on 2/21/20. Urology help appreciated.      urine culture 4/13/20 positive for >100,000 colonies of strep. Bovis   urine cx 2/19/21- >100,000 colonies of pseudomonas     S/p ciprofloxacin 9/28-10/4  Ureteral stricture S/P stent change 10/5/21  Urine cx 10/5/21- no growth     Hospitalization at [facility] October 2021 for septic shock due to partially treated uti, complicated UTI  Single positive blood cx for coagulase negative staph on 10/8 likely contamination    Negative repeat blood cultures 10/10.       Urine culture  10/9/2021 enterococcus faecalis (vancomycin resistant), candida nivariensis    Now with fever, dysuria, lower abdominal discomfort, positive urine cultures    Clinical presentation consistent with recurrent urinary tract infection, Proteus cystitis, complicated urinary tract infection    Management complicated due to presence of indwelling ureteral stent and risk of colonization of ureteral stent predisposing to recurrent infection.     Patient has documented h/o ciprofloxacin allergy.  She has tolerated ciprofloxacin on multiple occasions in the past.      Comminuted left hip fracture fracture involving the left anterior acetabular wall with involvement of the base of the left superior pubic ramus which are not significantly displaced. Nondisplaced left inferior pubic ramus fracture. Ortho consulted - non surgical treatment.     Recommendations   -Recommend ceftriaxone IV  -Follow-up results of urine culture and modify antibiotics accordingly  -Obtain CT abdomen/pelvis to rule out hydronephrosis, complicated urinary tract infection  -Discharge planning per primary team       Addendum: 12.50 pm  -Discussed with microbiology lab urine culture 10/16 reveals Proteus-susceptibilities were available in a.m. Urine culture 10/18 also growing Proteus. -Okay to switch patient to p.o. Cefpodoxime till 11/25/2021. will follow-up susceptibility of Proteus in urine culture and modify antibiotics in ARU  -I will follow patient in ARU for antibiotic modifications, urology evaluation  -We will consult urology after 1 week to determine need/timing of ureteral stent exchange    Thank you for consultation request. Above plan was discussed in details with patient, dr. Waldo Queen and dr Anthony Landaverde. Please call me if any further questions or concerns. Will continue to participate in the care of this patient. HPI:    64 y. o. female with PMH esrd on HD MWF, hx of recurrent UTIs/stones s/p R nephrostomy tube (since 9/2018), HTN, DM II w/ neuropathy (last hgbA1C not known), glaucoma, GERD, CTS, OA in back and knees, c.diff (in 2016)  presented to ed on 11/12/21  with A fib & L hip/pelvic fracture. Patient is known to me from prior inpatient consultation at [facility]. She has history of recurrent urinary tract infection, right ureteral stent. She was last admitted to [facility] in October 2021 with septic shock secondary to urinary tract infection. Status post right ureteral stent exchange.   She states that she was subsequently doing fine but started having burning while urinating about 2 weeks ago. She denies any subjective fever or chills at home. Patient reports on Sunday she was having palpitations and had a fall. She went to ED for L hip & leg pain workup which was negative for acute fracture. On 11/12/2021, she was at dialysis and was unable to bear weight or ambulate, so EMS was contacted and she was transported to ED. On arrival patient was tachycardic to 170s and with a fib on EKG. She was given 10mg diltiazem. HR responded well, down to 90s/low 100s. Repeat EKG showed possible A fib.    CT L Hip: Comminuted fracture involving the left anterior acetabular wall with involvement of the base of the left superior pubic ramus which are not significantly displaced. Nondisplaced left inferior pubic ramus fracture. Ortho consulted - non surgical treatment. In the interim patient complained of foul-smelling urine. Urine analysis on 11/16 revealed significant pyuria. Urine culture subsequently revealed Proteus. There was concern for contamination. Repeat urine analysis was sent on 11/18 which continued to reveal significant pyuria. There was concern for urinary tract infection. I have been consulted for further recommendations. Of note patient had fever with T-max 100 overnight. She continues to have burning while urinating. She also complains of lower abdominal discomfort.     Past Medical History:   Diagnosis Date    Acidosis     Anemia     Arteriovenous fistula (HCC)     Chronic kidney disease     on HD at [facility] on MWF. [phone]    Chronic pain     CKD (chronic kidney disease)     Diabetes ([facility] [address] [address]     no meds now    ESRD (end stage renal disease) on dialysis ([address] 9/9/2021    HLD (hyperlipidemia)     HTN (hypertension)     Hyperparathyroidism due to renal insufficiency ([address]     Hypothyroid     Kidney stone     Lung mass     Recurrent UTI     Ureter, stricture     Uric acid nephrolithiasis     Urinary incontinence Past Surgical History:   Procedure Laterality Date    HX APPENDECTOMY      HX CHOLECYSTECTOMY      HX GASTRIC BYPASS      Gastric stapling    HX KNEE ARTHROSCOPY      HX UROLOGICAL      right PCN placement    HX UROLOGICAL  07/23/2018    RIGHT URETEROSCOPY WITH HOLMIUM LASER    IR EXCHANGE NEPHRO PERC LT SI  2/21/2020    IR EXCHANGE NEPHRO PERC RT SI  4/13/2020    IR EXCHANGE NEPHRO PERC RT SI  7/17/2020    IR NEPHROSTOMY PERC RT PLC CATH  SI  10/14/2020    IR NEPHROURETERAL PERC RT PLC CATH NEW ACCESS  SI  4/30/2020    [place] INTRO CATH DIALYSIS CIRCUIT DX ANGRPH FLUOR S&I Left 9/24/2020    FISTULOGRAM LEFT/poss permanent catheter placement performed by Gato Vidal MD at [facility] CATH LAB    VASCULAR SURGERY PROCEDURE UNLIST      lef AVF       Current Discharge Medication List      CONTINUE these medications which have NOT CHANGED    Details   acetaminophen (Tylenol Extra Strength) 500 mg tablet Take 1 Tablet by mouth every six (6) hours as needed for Pain. Qty: 30 Tablet, Refills: 0      apixaban (ELIQUIS) 5 mg tablet Take 1 Tablet by mouth two (2) times a day. Qty: 60 Tablet, Refills: 0      HYDROmorphone (DILAUDID) 2 mg tablet Take 1 mg by mouth every twelve (12) hours as needed for Pain. meclizine (ANTIVERT) 25 mg tablet Take 25 mg by mouth three (3) times daily as needed for Dizziness. methoxy peg-epoetin beta (MIRCERA INJECTION) 30 mcg. DULoxetine (Cymbalta) 20 mg capsule Take 20 mg by mouth daily. b complex vitamins (Vitamins B Complex) tablet Take 1 Tab by mouth daily. estradioL (Estrace) 0.01 % (0.1 mg/gram) vaginal cream Apply a fingertip amount around the urethra three times a week. Qty: 30 g, Refills: 3      biotin 1,000 mcg chew Take 1 Tab by mouth daily. cyanocobalamin 1,000 mcg tablet Take 1,000 mcg by mouth daily. lactobacillus sp. 50 billion cpu (BIO-K PLUS) 50 billion cell -375 mg cap capsule Take 1 Cap by mouth daily.   Qty: 30 Cap, Refills: 2      allopurinoL (ZYLOPRIM) 100 mg tablet Take 100 mg by mouth daily. ascorbic acid, vitamin C, (VITAMIN C) 500 mg tablet Take 500 mg by mouth daily. calcitRIOL (ROCALTROL) 0.25 mcg capsule Take 0.25 mcg by mouth daily. cholecalciferol (VITAMIN D3) (2,000 UNITS /50 MCG) cap capsule Take 2,000 Units by mouth two (2) times a day. Take two tabs a total of 4000 units      latanoprost (XALATAN) 0.005 % ophthalmic solution Administer 1 Drop to both eyes nightly. One drop at bedtime      levothyroxine (SYNTHROID) 125 mcg tablet Take 125 mcg by mouth Daily (before breakfast). omeprazole (PRILOSEC) 20 mg capsule Take 20 mg by mouth daily. ondansetron (ZOFRAN ODT) 4 mg disintegrating tablet Take 4 mg by mouth every eight (8) hours as needed for Nausea or Vomiting. vit B Cmplx 3-FA-Vit C-Biotin (NEPHRO HOWIE RX) 1- mg-mg-mcg tablet Take 1 Tab by mouth daily. lidocaine (LIDODERM) 5 % Apply patch to the affected area for 12 hours a day and remove for 12 hours a day. Qty: 1 Each, Refills: 0      gabapentin (NEURONTIN) 100 mg capsule Take 2 Capsules by mouth Three (3) times a week. Max Daily Amount: 200 mg. Take 2 capsules by mouth 3 times a week as needed for pain post dialysis.   Indications: concern for back pain post dialysis  Qty: 15 Capsule, Refills: 0    Associated Diagnoses: Type 2 diabetes mellitus with diabetic autonomic neuropathy, without long-term current use of insulin ([facility]); ESRD (end stage renal disease) ([facility])          Current Facility-Administered Medications   Medication Dose Route Frequency    amiodarone (CORDARONE) tablet 200 mg  200 mg Oral DAILY    epoetin caitlin-epbx (RETACRIT) injection 5,000 Units  5,000 Units SubCUTAneous Q MON, WED & FRI    cholecalciferol (VITAMIN D3) (1000 Units /25 mcg) tablet 2,000 Units  2,000 Units Oral BID    trimethobenzamide (TIGAN) injection 200 mg  200 mg IntraMUSCular Q6H PRN    doxercalciferoL (HECTOROL) 4 mcg/2 mL injection 4 mcg  4 mcg IntraVENous DIALYSIS MON, WED & FRI    sodium chloride (NS) flush 5-40 mL  5-40 mL IntraVENous Q8H    sodium chloride (NS) flush 5-40 mL  5-40 mL IntraVENous PRN    acetaminophen (TYLENOL) tablet 650 mg  650 mg Oral Q6H PRN    Or    acetaminophen (TYLENOL) suppository 650 mg  650 mg Rectal Q6H PRN    polyethylene glycol (MIRALAX) packet 17 g  17 g Oral DAILY PRN    ondansetron (ZOFRAN ODT) tablet 4 mg  4 mg Oral Q8H PRN    Or    ondansetron (ZOFRAN) injection 4 mg  4 mg IntraVENous Q6H PRN    allopurinoL (ZYLOPRIM) tablet 100 mg  100 mg Oral DAILY    ascorbic acid (vitamin C) (VITAMIN C) tablet 500 mg  500 mg Oral DAILY    vitamin B complex-folic acid 0.4 mg tablet  1 Tablet Oral DAILY    biotin chew 1 Tablet (Patient Supplied)  1 Tablet Oral DAILY    cyanocobalamin tablet 1,000 mcg  1,000 mcg Oral DAILY    DULoxetine (CYMBALTA) capsule 20 mg  20 mg Oral DAILY    gabapentin (NEURONTIN) capsule 200 mg  200 mg Oral 3 times weekly    L. acidophilus,casei,rhamnosus (BIO-K PLUS) capsule 1 Capsule  1 Capsule Oral DAILY    latanoprost (XALATAN) 0.005 % ophthalmic solution 1 Drop  1 Drop Both Eyes QHS    levothyroxine (SYNTHROID) tablet 125 mcg  125 mcg Oral ACB    lidocaine 4 % patch 1 Patch  1 Patch TransDERmal DAILY    midodrine (PROAMATINE) tablet 10 mg  10 mg Oral TID WITH MEALS    pantoprazole (PROTONIX) tablet 20 mg  20 mg Oral ACB    B complex-vitaminC-folic acid (NEPHROCAP) cap  1 Capsule Oral DAILY    apixaban (ELIQUIS) tablet 5 mg  5 mg Oral BID    HYDROmorphone (DILAUDID) 1 mg/mL oral solution 1 mg  1 mg Oral Q12H PRN    HYDROmorphone (DILAUDID) tablet 1.5 mg  1.5 mg Oral Q12H PRN       Allergies: Ciprofloxacin, Cyclopentolate, Iron sucrose, and Statins-hmg-coa reductase inhibitors    Family History   Problem Relation Age of Onset    Heart Surgery Sister      Social History     Socioeconomic History    Marital status: SINGLE     Spouse name: Not on file    Number of children: Not on file    Years of education: Not on file    Highest education level: Not on file   Occupational History    Not on file   Tobacco Use    Smoking status: Never Smoker    Smokeless tobacco: Never Used   Vaping Use    Vaping Use: Never used   Substance and Sexual Activity    Alcohol use: Never    Drug use: Never    Sexual activity: Not on file   Other Topics Concern    Not on file   Social History Narrative    Not on file     Social Determinants of Health     Financial Resource Strain:     Difficulty of Paying Living Expenses: Not on file   Food Insecurity:     Worried About [address] in the Last Year: Not on file    Cheryl of Food in the Last Year: Not on file   Transportation Needs:     Lack of Transportation (Medical): Not on file    Lack of Transportation (Non-Medical):  Not on file   Physical Activity:     Days of Exercise per Week: Not on file    Minutes of Exercise per Session: Not on file   Stress:     Feeling of Stress : Not on file   Social Connections:     Frequency of Communication with Friends and Family: Not on file    Frequency of Social Gatherings with Friends and Family: Not on file    Attends Islam Services: Not on file    Active Member of [address] [place] [address] or Organizations: Not on file    Attends Club or Organization Meetings: Not on file    Marital Status: Not on file   Intimate Partner Violence:     Fear of Current or Ex-Partner: Not on file    Emotionally Abused: Not on file    Physically Abused: Not on file    Sexually Abused: Not on file   Housing Stability:     Unable to Pay for Housing in the Last Year: Not on file    Number of [place] in the Last Year: Not on file    Unstable Housing in the Last Year: Not on file     Social History     Tobacco Use   Smoking Status Never Smoker   Smokeless Tobacco Never Used        Temp (24hrs), Av.9 °F (37.2 °C), Min:98.3 °F (36.8 °C), Max:100 °F (37.8 °C)    Visit Vitals  BP (!) 97/52 (BP 1 Location: Right lower arm, BP Patient Position: At rest)   Pulse 89   Temp 98.4 °F (36.9 °C)   Resp 18   Ht 5' 2\" (1.575 m)   Wt 94.8 kg (209 lb 1.6 oz)   SpO2 94%   BMI 38.24 kg/m²       ROS: 12 point ROS obtained in details. Pertinent positives as mentioned in HPI,   otherwise negative    Physical Exam:      General:  Alert, cooperative, NAD   Head:  Normocephalic, without obvious abnormality, atraumatic. Eyes:  Conjunctivae/corneas clear. Nose: Nares normal. Septum midline. Mucosa normal. No drainage or sinus tenderness. Throat: Lips, mucosa, and tongue normal. Teeth and gums normal.   Neck: Supple, symmetrical, trachea midline, no adenopathy, no carotid bruit and no JVD. Lungs:   Symmetrical chest rise; good AE bilat; CTAB; no wheezes/rhonchi/rales noted. Heart:  RRR, S1, S2 normal   Abdomen:   Soft, +nt suprapubic tenderness. Bowel sounds normal. No masses,  No organomegaly. no CVA tenderness   Extremities: Extremities normal, atraumatic, no cyanosis or edema. Pulses: 2+ and symmetric all extremities.    Skin: Skin color, texture, turgor normal.    Neurologic: Grossly nonfocal   Devices: PIVs               Labs: Results:   Chemistry Recent Labs     11/19/21 0124 11/18/21 0143 11/17/21 0145   * 88 95    139 136   K 4.8 4.3 4.7   CL 99* 101 98*   CO2 32 30 28   BUN 41* 26* 54*   CREA 6.14* 4.30* 6.64*   CA 8.9 8.9 8.6   AGAP 7 8 10   BUCR 7* 6* 8*      CBC w/Diff Recent Labs     11/19/21 0124 11/18/21 0143 11/17/21  0145   WBC 8.7 6.9 8.9   RBC 3.01* 2.91* 3.11*   HGB 9.2* 9.0* 9.4*   HCT 29.9* 29.4* 30.6*    343 426*   GRANS 54 53 58   LYMPH 31 31 28   EOS 2 2 2      Microbiology Recent Labs     11/16/21  1205   CULT PROTEUS SPECIES*          RADIOLOGY:    All available imaging studies/reports in [facility] for this admission were reviewed      Disclaimer: Sections of this note are dictated utilizing voice recognition software, which may have resulted in some phonetic based errors in grammar and contents. Even though attempts were made to correct all the mistakes, some may have been missed, and remained in the body of the document. If questions arise, please contact our department.     Dr. Indira Long, Infectious Disease Specialist  [phone]  November 19, 2021  7:44 AM

## 2021-11-19 NOTE — PROGRESS NOTES
Physician Progress Note      PATIENTGinnie Letters  [facility]:                  [id number]  :                       1945  ADMIT DATE:       2021 3:36 PM  DISCH DATE:  RESPONDING  PROVIDER #:        Alfonso Price MD          QUERY TEXT:    Pt admitted with UTI. Pt noted to have chronic ureteral stent . If possible, please document in the progress notes and discharge summary if you are evaluating and/or treating any of the following: The medical record reflects the following:  Risk Factors: recurrent UTI's; previous  nephrostomy  tube  for stricture. Clinical Indicators: s/p multiple stone surgeries in the past with a history of stent migration. She had the tube changed to a nephroureteral catheter on 2020. Patient was bothered by bag, and converted to 8F JJ stent without issues  Treatment: -  IV  Rocephin  2  gms   IV  q24h  CT Abd/Pelvis -Right ureter stent is in place. Mild caliectasis surrounding the proximal  coiled end of the stent. Thank  you,   Abilio Potts RN   CCDS  x [phone]  Options provided:  -- UTI due to ureteral stent  -- UTI not due to ureteral stent  -- Other - I will add my own diagnosis  -- Disagree - Not applicable / Not valid  -- Disagree - Clinically unable to determine / Unknown  -- Refer to Clinical Documentation Reviewer    PROVIDER RESPONSE TEXT:    Provider is clinically unable to determine a response to this query.     Query created by: Nael Ignacio on 2021 12:59 PM      Electronically signed by:  Alfonso Price MD 2021 1:03 PM

## 2021-11-19 NOTE — PROGRESS NOTES
RENAL DAILY PROGRESS NOTE              Subjective:       Complaint:   Feels well  Overnight events noted  no nausea, vomiting, chest pain, short of breath, cough, seizure. C/o headache post dialysis. Wondering whether albumin infusion is causing it? ?  At 11:05 AM on 11/19/2021, I saw and examined patient during hemodialysis treatment. The patient was receiving hemodialysis for treatment of  renal failure. I have also reviewed vital signs, intake and output, lab results and recent events, and agreed with today's dialysis order. IMPRESSION:   IMPRESSION:   · esrd mwf dialysis  · Pelvic fx,non displaced  · A fib  · Chronic hypotension,on midodrine  · Secondary hyperparathyroidism  · Anemia of chronic disease      PLAN:   C/w hectorol and retacrit  HD per schedule. D/c  albumin and reduce UF  to see how she does from headache standpoint. Reduce Qb little bit too  Avoid Gadolinium due to its association with nephrogenic systemic fibrosis in a patients with severe ARF and ESRD.    Please dose all medications for creatinine clearance <15/dialysis.    Avoid blood pressure checks, blood draws, peripheral iv's on arm with access. AvoidPICC lines on either arm in order to preserve veins for dialysis access creation.               Current Facility-Administered Medications   Medication Dose Route Frequency    cefTRIAXone (ROCEPHIN) 2 g in sterile water (preservative free) 20 mL IV syringe  2 g IntraVENous Q24H    albumin human 25% (BUMINATE) solution 25 g  25 g IntraVENous DIALYSIS PRN    amiodarone (CORDARONE) tablet 200 mg  200 mg Oral DAILY    epoetin caitlin-epbx (RETACRIT) injection 5,000 Units  5,000 Units SubCUTAneous Q MON, WED & FRI    cholecalciferol (VITAMIN D3) (1000 Units /25 mcg) tablet 2,000 Units  2,000 Units Oral BID    trimethobenzamide (TIGAN) injection 200 mg  200 mg IntraMUSCular Q6H PRN    doxercalciferoL (HECTOROL) 4 mcg/2 mL injection 4 mcg  4 mcg IntraVENous DIALYSIS MON, WED & FRI  sodium chloride (NS) flush 5-40 mL  5-40 mL IntraVENous Q8H    sodium chloride (NS) flush 5-40 mL  5-40 mL IntraVENous PRN    acetaminophen (TYLENOL) tablet 650 mg  650 mg Oral Q6H PRN    Or    acetaminophen (TYLENOL) suppository 650 mg  650 mg Rectal Q6H PRN    polyethylene glycol (MIRALAX) packet 17 g  17 g Oral DAILY PRN    ondansetron (ZOFRAN ODT) tablet 4 mg  4 mg Oral Q8H PRN    Or    ondansetron (ZOFRAN) injection 4 mg  4 mg IntraVENous Q6H PRN    allopurinoL (ZYLOPRIM) tablet 100 mg  100 mg Oral DAILY    ascorbic acid (vitamin C) (VITAMIN C) tablet 500 mg  500 mg Oral DAILY    vitamin B complex-folic acid 0.4 mg tablet  1 Tablet Oral DAILY    biotin chew 1 Tablet (Patient Supplied)  1 Tablet Oral DAILY    cyanocobalamin tablet 1,000 mcg  1,000 mcg Oral DAILY    DULoxetine (CYMBALTA) capsule 20 mg  20 mg Oral DAILY    gabapentin (NEURONTIN) capsule 200 mg  200 mg Oral 3 times weekly    L. acidophilus,casei,rhamnosus (BIO-K PLUS) capsule 1 Capsule  1 Capsule Oral DAILY    latanoprost (XALATAN) 0.005 % ophthalmic solution 1 Drop  1 Drop Both Eyes QHS    levothyroxine (SYNTHROID) tablet 125 mcg  125 mcg Oral ACB    lidocaine 4 % patch 1 Patch  1 Patch TransDERmal DAILY    midodrine (PROAMATINE) tablet 10 mg  10 mg Oral TID WITH MEALS    pantoprazole (PROTONIX) tablet 20 mg  20 mg Oral ACB    B complex-vitaminC-folic acid (NEPHROCAP) cap  1 Capsule Oral DAILY    apixaban (ELIQUIS) tablet 5 mg  5 mg Oral BID    HYDROmorphone (DILAUDID) 1 mg/mL oral solution 1 mg  1 mg Oral Q12H PRN    HYDROmorphone (DILAUDID) tablet 1.5 mg  1.5 mg Oral Q12H PRN       Review of Symptoms: comprehensive ROS negative except above.    Objective:     Patient Vitals for the past 24 hrs:   Temp Pulse Resp BP SpO2   11/19/21 1045 -- 79 -- 116/70 --   11/19/21 1030 -- 67 -- (!) 107/49 --   11/19/21 1015 -- 60 -- (!) 131/57 --   11/19/21 1000 -- 70 -- 119/65 --   11/19/21 0945 -- 62 -- (!) 117/99 --   11/19/21 0914 -- 75 -- (!) 109/58 --   11/19/21 0915 -- 71 -- (!) 95/52 --   11/19/21 0900 -- 77 -- 91/61 --   11/19/21 0859 -- 67 -- 116/61 --   11/19/21 0855 97.4 °F (36.3 °C) (!) 58 18 91/65 --   11/19/21 0751 97.5 °F (36.4 °C) 78 18 119/65 94 %   11/18/21 1836 -- -- -- (!) 97/52 --   11/18/21 1726 98.4 °F (36.9 °C) 89 18 (!) 88/43 94 %   11/18/21 1149 100 °F (37.8 °C) 85 18 107/63 97 %        Weight change: 12.8 kg (28 lb 5.2 oz)     11/17 1901 - 11/19 0700  In: 400 [P.O.:400]  Out: 0   No intake or output data in the 24 hours ending 11/19/21 1104  Physical Exam:   General: comfortable, no acute distress   HEENT sclera anicteric, supple neck, no thyromegaly  CVS: S1S2 heard,  no rub  RS: + air entry b/l,   Abd: Soft, Non tender, Not distended, Positive bowel sounds, no organomegaly, no CVA / supra pubic tenderness  Extrm: plus 1 edema, no cyanosis, clubbing   Skin: no visible  Rash  Musculoskeletal: No gross joints or bone deformities         Data Review:     LABS:   Hematology:   Recent Labs     11/19/21 0124 11/18/21 0143 11/17/21 0145   WBC 8.7 6.9 8.9   HGB 9.2* 9.0* 9.4*   HCT 29.9* 29.4* 30.6*     Chemistry:   Recent Labs     11/19/21 0124 11/18/21 0143 11/17/21  0145   BUN 41* 26* 54*   CREA 6.14* 4.30* 6.64*   CA 8.9 8.9 8.6   K 4.8 4.3 4.7    139 136   CL 99* 101 98*   CO2 32 30 28   * 88 95            Procedures/imaging: see electronic medical records for all procedures, Xrays and details which were not copied into this note but were reviewed prior to creation of Plan          Assessment & Plan:       See above      Damian Melton MD  11/19/2021

## 2021-11-19 NOTE — PROGRESS NOTES
Attempted to see patient for skilled OT re-evaluation. 3094; Patient off the unit for testing. 1055, RN reports patient now off unit for HD. Will continue to follow and see patient when available/as appropriate.             Thank you,   Haseeb Cm MS, OTR/L

## 2021-11-19 NOTE — PROGRESS NOTES
Intern Progress Note  [facility]       Patient: Adarsh Amador MRN: [id number]  CSN: [id number]    YOB: 1945  Age: 64 y.o. Sex: female    DOA: 11/12/2021 LOS:  LOS: 7 days                    SUBJECTIVE    No other acute events overnight. She is resting comfortably this AM. Patient does not make much urine, but endorses dysuria w/a pressure-like feeling. Denies lightheadedness, F/C, nausea, abd pain, SOB, cough, CP, or worsening back pain. ROS: Please see above for focused ROS.      OBJECTIVE    Vital Signs:  Patient Vitals for the past 24 hrs:   Temp Pulse Resp BP SpO2   11/19/21 0751 97.5 °F (36.4 °C) 78 18 119/65 94 %   11/18/21 1836 -- -- -- (!) 97/52 --   11/18/21 1726 98.4 °F (36.9 °C) 89 18 (!) 88/43 94 %   11/18/21 1149 100 °F (37.8 °C) 85 18 107/63 97 %   11/18/21 0819 98.3 °F (36.8 °C) (!) 53 18 (!) 121/51 95 %       No intake or output data in the 24 hours ending 11/19/21 0757    Lab/Data:  Recent Results (from the past 24 hour(s))   URINALYSIS W/MICROSCOPIC    Collection Time: 11/18/21  1:18 PM   Result Value Ref Range    Color DARK YELLOW      Appearance TURBID      Specific gravity 1.022 1.005 - 1.030      pH (UA) 7.5 5.0 - 8.0      Protein 300 (A) NEG mg/dL    Glucose Negative NEG mg/dL    Ketone Negative NEG mg/dL    Bilirubin Negative NEG      Blood MODERATE (A) NEG      Urobilinogen 0.2 0.2 - 1.0 EU/dL    Nitrites Negative NEG      Leukocyte Esterase LARGE (A) NEG      WBC TOO NUMEROUS TO COUNT 0 - 4 /hpf    RBC  0 - 5 /hpf     UNABLE TO QUANTITATE MICROSCOPIC PARAMETERS DUE TO EXCESSIVE WBCS    Epithelial cells  0 - 5 /lpf     UNABLE TO QUANTITATE MICROSCOPIC PARAMETERS DUE TO EXCESSIVE WBCS    Bacteria (A) NEG /hpf     UNABLE TO QUANTITATE MICROSCOPIC PARAMETERS DUE TO EXCESSIVE WBCS   CBC WITH AUTOMATED DIFF    Collection Time: 11/19/21  1:24 AM   Result Value Ref Range    WBC 8.7 4.6 - 13.2 K/uL    RBC 3.01 (L) 4.20 - 5.30 M/uL    HGB 9.2 (L) 12.0 - 16.0 g/dL    HCT 29.9 (L) 35.0 - 45.0 %    MCV 99.3 78.0 - 100.0 FL    MCH 30.6 24.0 - 34.0 PG    MCHC 30.8 (L) 31.0 - 37.0 g/dL    RDW 15.5 (H) 11.6 - 14.5 %    PLATELET 185 751 - 506 K/uL    MPV 9.6 9.2 - 11.8 FL    NRBC 0.0 0  WBC    ABSOLUTE NRBC 0.00 0.00 - 0.01 K/uL    NEUTROPHILS 54 40 - 73 %    LYMPHOCYTES 31 21 - 52 %    MONOCYTES 11 (H) 3 - 10 %    EOSINOPHILS 2 0 - 5 %    BASOPHILS 1 0 - 2 %    IMMATURE GRANULOCYTES 2 (H) 0.0 - 0.5 %    ABS. NEUTROPHILS 4.7 1.8 - 8.0 K/UL    ABS. LYMPHOCYTES 2.7 0.9 - 3.6 K/UL    ABS. MONOCYTES 1.0 0.05 - 1.2 K/UL    ABS. EOSINOPHILS 0.1 0.0 - 0.4 K/UL    ABS. BASOPHILS 0.1 0.0 - 0.1 K/UL    ABS. IMM. GRANS. 0.1 (H) 0.00 - 0.04 K/UL    DF AUTOMATED     METABOLIC PANEL, BASIC    Collection Time: 11/19/21  1:24 AM   Result Value Ref Range    Sodium 138 136 - 145 mmol/L    Potassium 4.8 3.5 - 5.5 mmol/L    Chloride 99 (L) 100 - 111 mmol/L    CO2 32 21 - 32 mmol/L    Anion gap 7 3.0 - 18 mmol/L    Glucose 103 (H) 74 - 99 mg/dL    BUN 41 (H) 7.0 - 18 MG/DL    Creatinine 6.14 (H) 0.6 - 1.3 MG/DL    BUN/Creatinine ratio 7 (L) 12 - 20      GFR est AA 8 (L) >60 ml/min/1.73m2    GFR est non-AA 7 (L) >60 ml/min/1.73m2    Calcium 8.9 8.5 - 10.1 MG/DL       PHYSICAL EXAM    General: Resting comfortably in hospital bed in NAD. Pleasant, cooperative. Cardiovascular:  Irregular rhythm, regular rate, no M/G/R. DP 2+ bilaterally. Respiratory: Normal work of breathing. No intercostal retractions or accessory muscle use. CTAB, no wheezes, rales or rhonchi. Bilateral and symmetric chest rise. Gastrointestinal: soft, NT, ND  MSK: Skin warm and dry. No grossly visible rashes, lesions, or ulcers. No BLE edema. Neurological/Psych:  Alert and oriented to person, time, place, and situation. Normal mood and affect. No focal neurological deficits. ASSESSMENT AND PLAN    64 y. o. female with PMH  A fib,ESRD on HD MWF,  chronic hypotension, DM, chronic back pain, depression now admitted with a fib & L hip/pelvic fracture.     A fib   HR 170s at presentation. S/p 15mg dilt in ED. HR now in 90s, no episodes of RVR overnight. EKG with a fib. Cardiology consulted, will follow up recs.  On eliquis 5mg BID. Not on any rate controlling meds at home due to chronic hypotension  - Patient currently with stable HR not on any meds. Can admin dilt if HR uncontrolled again.   - FU cards recs  - Continue Eliquis, 200mg Amiodarone QD  - Continue tele     Symptomatic UTI in the setting of oliguria, recurrent UTIs, s/p R ureteral stent placement for stricture. Per chart review, follows w/Urology in OP setting (Dr. Diogenes Castillo last seen 9/30/20). Previously managed by nephrostomy tube since 2018 and is s/p multiple stone surgeries in the past with a history of stent migration. She had the tube changed to a nephroureteral catheter on 7/17/2020. Patient was bothered by bag, and converted to 8F JJ stent without issues. Prior cultures significant for: 10/21 enterococcus fecalis- Amp, nitrofurantoin susceptible, 9/21 pseudomonas-pan susceptible, 6/21 stenotrophomonas - Bactrim, 2/21 pseudomonas- pan susceptible, 6/20 E. Coli-pan susceptible. - Recommend lifestyle modifications including increased PO fluid, timed voiding, cranberry and D-mannose supplements. - FU UCx 11/16  - ID consulted. Appreciate the recommendations.  - Started on Ceftriaxone pending susceptibilities to transition to PO  - FU STAT CT Abd/Pelvis to evaluate for hydronephrosis and ureteral stent position. Consider Urology consult if any abnormalities     L hip/leg pain  CT L Hip: Comminuted fracture involving the left anterior acetabular wall with involvement of the base of the left superior pubic ramus which are not significantly displaced. Nondisplaced left inferior pubic ramus fracture.   - Ortho consulted - non surgical treatment.  PT & pain control.   - Pain control: start with home pain regimen - hydromorphone 2 mg tablets -> on dialysis days take 1.5 tablets q12h, on NON dialysis days take 1/2 tablet q12h  - Accepted to ARU   - PT/OT     Chronic hypotension  On Midodrine 10 TID.  - Continue home midodrine     ESRD on HD MWF  - Consult nephro for HD while inpatient. - renally dose meds   - avoid nephrotoxic agents  - UA: Large leukocyte esterase  - Infectious disease consulted, FU recs. Appreciate the assistance.     DM   10/8 A1c 4.6. Not on any medications.   - monitor BG on daily BMP     Chronic Back Pain 2/2 DDD  Home regimen: gabapentin 100mg take 2 tablets after dialysis; Hydromorphone 2mg prn - 1.5 tabs on dialysis days, 1/2 tab on non dialysis days.   - continue home pain regimen     Depression  Home med: cymbalta 20mg  - Continue home cymbalta.      Global Care:  - VS per unit routine  - supplemental O2 for sats < 92%  - incentive spirometer  - Will F/U with CM regarding ARU placement.  - PT/OT/CM     Diet  Adult Diet   DVT Prophylaxis  SCDs   GI Prophylaxis  Pantoprazole   Code status  DNR   Disposition  Telemetry       Point of Contact Son: Freddy Javier  [phone]          [facility] inpatient team is available 24/7 at [phone] should any further acute changes or concerns arise.      Neha Naranjo MD , PGY-1   [facility]   November 19, 2021, 8:23 AM

## 2021-11-19 NOTE — PROGRESS NOTES
4037: Attempted PT session. Preparing to leave floor for CT followed by HD. Will follow up as able. 1130: 2nd PT attempt. Remains off the floor at HD. Will follow up.

## 2021-11-19 NOTE — PROGRESS NOTES
ARU/IPR REFERRAL CONTACT NOTE  [id number] [place] [address] for Physical Rehabilitation          RE:  [facility]     Thank you for the opportunity to review this patient's case for admission to [id number] [place] [address] for Physical Rehabilitation. Based on our pre-admission screening patient meets criteria for admission to [facility] for Physical Rehabilitation. Plan for admission today to room 178 at 4pm.  Will need d/c summary/med rec completed and report called to [phone] prior to patient's arrival.    Again, Thank you for this referral. Should you have any questions please do not hesitate to call. Sincerely,  Lala Maldonado.  [facility]  [phone]

## 2021-11-19 NOTE — ROUTINE PROCESS
Bedside and Verbal shift change report given to Andria RN (oncoming nurse) by Naheed navarro. Report included the following information SBAR.

## 2021-11-20 LAB
BACTERIA SPEC CULT: ABNORMAL
BACTERIA SPEC CULT: ABNORMAL
CC UR VC: ABNORMAL
CC UR VC: ABNORMAL
SERVICE CMNT-IMP: ABNORMAL
SERVICE CMNT-IMP: ABNORMAL

## 2021-11-20 PROCEDURE — 97535 SELF CARE MNGMENT TRAINING: CPT

## 2021-11-20 PROCEDURE — 97110 THERAPEUTIC EXERCISES: CPT

## 2021-11-20 PROCEDURE — 74011250637 HC RX REV CODE- 250/637: Performed by: INTERNAL MEDICINE

## 2021-11-20 PROCEDURE — 97163 PT EVAL HIGH COMPLEX 45 MIN: CPT

## 2021-11-20 PROCEDURE — 97165 OT EVAL LOW COMPLEX 30 MIN: CPT

## 2021-11-20 PROCEDURE — 97530 THERAPEUTIC ACTIVITIES: CPT

## 2021-11-20 PROCEDURE — 74011250637 HC RX REV CODE- 250/637: Performed by: FAMILY MEDICINE

## 2021-11-20 PROCEDURE — 74011000250 HC RX REV CODE- 250: Performed by: INTERNAL MEDICINE

## 2021-11-20 PROCEDURE — 65310000000 HC RM PRIVATE REHAB

## 2021-11-20 PROCEDURE — 97542 WHEELCHAIR MNGMENT TRAINING: CPT

## 2021-11-20 RX ORDER — POLYETHYLENE GLYCOL 3350 17 G/17G
17 POWDER, FOR SOLUTION ORAL DAILY
Status: DISCONTINUED | OUTPATIENT
Start: 2021-11-21 | End: 2021-11-23

## 2021-11-20 RX ORDER — CETIRIZINE HCL 10 MG
10 TABLET ORAL DAILY
Status: DISCONTINUED | OUTPATIENT
Start: 2021-11-20 | End: 2021-12-04 | Stop reason: HOSPADM

## 2021-11-20 RX ADMIN — CEFPODOXIME PROXETIL 200 MG: 200 TABLET, FILM COATED ORAL at 19:05

## 2021-11-20 RX ADMIN — APIXABAN 5 MG: 5 TABLET, FILM COATED ORAL at 08:49

## 2021-11-20 RX ADMIN — AMIODARONE HYDROCHLORIDE 200 MG: 200 TABLET ORAL at 08:48

## 2021-11-20 RX ADMIN — DULOXETINE HYDROCHLORIDE 20 MG: 20 CAPSULE, DELAYED RELEASE ORAL at 08:49

## 2021-11-20 RX ADMIN — CHOLECALCIFEROL TAB 25 MCG (1000 UNIT) 2000 UNITS: 25 TAB at 08:49

## 2021-11-20 RX ADMIN — LEVOTHYROXINE SODIUM 125 MCG: 125 TABLET ORAL at 05:48

## 2021-11-20 RX ADMIN — ACETAMINOPHEN 650 MG: 325 TABLET ORAL at 17:20

## 2021-11-20 RX ADMIN — Medication 1 CAPSULE: at 08:47

## 2021-11-20 RX ADMIN — MIDODRINE HYDROCHLORIDE 10 MG: 5 TABLET ORAL at 08:47

## 2021-11-20 RX ADMIN — CHOLECALCIFEROL TAB 25 MCG (1000 UNIT) 2000 UNITS: 25 TAB at 17:21

## 2021-11-20 RX ADMIN — CETIRIZINE HYDROCHLORIDE 10 MG: 10 TABLET, FILM COATED ORAL at 19:05

## 2021-11-20 RX ADMIN — ACETAMINOPHEN 650 MG: 325 TABLET ORAL at 08:48

## 2021-11-20 RX ADMIN — CYANOCOBALAMIN TAB 1000 MCG 1000 MCG: 1000 TAB at 08:48

## 2021-11-20 RX ADMIN — LATANOPROST 1 DROP: 50 SOLUTION OPHTHALMIC at 20:41

## 2021-11-20 RX ADMIN — ALLOPURINOL 100 MG: 100 TABLET ORAL at 08:48

## 2021-11-20 RX ADMIN — ACETAMINOPHEN 650 MG: 325 TABLET ORAL at 12:06

## 2021-11-20 RX ADMIN — Medication 1 TABLET: at 08:48

## 2021-11-20 RX ADMIN — PANTOPRAZOLE SODIUM 20 MG: 20 TABLET, DELAYED RELEASE ORAL at 06:39

## 2021-11-20 RX ADMIN — APIXABAN 5 MG: 5 TABLET, FILM COATED ORAL at 17:21

## 2021-11-20 RX ADMIN — ACETAMINOPHEN 650 MG: 325 TABLET ORAL at 02:20

## 2021-11-20 RX ADMIN — Medication 500 MG: at 08:48

## 2021-11-20 NOTE — PROGRESS NOTES
Long Term Goals (to be met upon discharge date) in order to increase pt's functional independence and safety, and decrease burden of care:  1. Pt will perform self-feeding with Mod Independent  2. Pt will perform grooming with Mod Independent  3. Pt will perform UB bathing with supervision  4. Pt will perform LB bathing with SBA  5. Pt will perform shower transfer with SBA  6. Pt will perform UB dressing with supervision  7. Pt will perform LB dressing with SBA  8. Pt will perform toileting task with SBA  9. Pt will perform toilet transfer with SBA  10. Pt will perform an IADL task while standing with SBA    Short Term Weekly Goals for (2021-2021) in order to increase pt's functional independence and safety, and decrease burden of care:  1. Pt will perform self-feeding with supervision  2. Pt will perform grooming with supervision  3. Pt will perform UB bathing with SBA  4. Pt will perform LB bathing with Min A  5. Pt will perform shower transfer with Min A  6. Pt will perform UB dressing with SBA  7. Pt will perform LB dressing with Min A  8. Pt will perform toileting task with Min A  9. Pt will perform toilet transfer with [address] THERAPY EVALUATION    Patient: [facility] 64 y.o.   Date: 2021  Primary Diagnosis: Multiple closed pelvic fractures without disruption of pelvic ring ([address] [S32.82XA]    Patient identified with name and : yes    Past Medical History:   Past Medical History:   Diagnosis Date    Acidosis     Anemia     Arteriovenous fistula ([address]     Chronic kidney disease     on HD at [facility] on [address] on MWF. [phone]    Chronic pain     CKD (chronic kidney disease)     Diabetes ([address]     no meds now    ESRD (end stage renal disease) on dialysis ([address] 2021    HLD (hyperlipidemia)     HTN (hypertension)     Hyperparathyroidism due to renal insufficiency (HCC)     Hypothyroid     Kidney stone     Lung mass     Recurrent UTI     Ureter, stricture     Uric acid nephrolithiasis     Urinary incontinence      Precautions: fall risk    Time In: 8:00  Time Out[de-identified] 9:00    Pain:  Pt reports 0/10 pain or discomfort prior to treatment. Pt reports 0/10 pain or discomfort post treatment. SUBJECTIVE:   Patient stated I need to get my strength back to do what I want to do.     OBJECTIVE DATA SUMMARY:   OT reviewed POC and rehab expectations for self care independence & safety. [x]  Right hand dominant   []  Left hand dominant    Therapy Diagnosis:   Difficulty with ADLs  [x]     Difficulty with functional transfers  [x]     Difficulty with ambulation  [x]     Difficulty with IADLs  [x]       Problem List:    Decreased strength B UE  [x]     Decreased strength trunk/core  []     Decreased AROM   []     Decreased endurance  [x]     Decreased balance sitting  []     Decreased balance standing  [x]     Pain   [x]     Decreased PROM  []       Functional Limitations:   Decreased independence with ADL  [x]     Decreased independence with functional transfers  [x]     Decreased independence with ambulation  [x]     Decreased independence with IADL  [x]       Previous Functional Level: Mod Independent; She enjoys watching TV, family activities and she cares for   her little pug K9, \"Yarelis\".     Home Environment: Home Situation  Home Environment: Private residence  # Steps to Enter: 2  One/Two Story Residence: Two story  # of Interior Steps: 12  Living Alone: No  Support Systems: Child(isaura)  Patient Expects to be Discharged to[de-identified] Rehabilitation facility  Current DME Used/Available at Home: Walker, rollator    Barriers to Learning/Limitations: None  Compensate with: visual, verbal, tactile, kinesthetic cues/model    Outcome Measures:      MMT Initial Assessment   Right Upper Extremity  Left Upper Extremity    UE AROM WNL WNL   Shoulder flexion     Shoulder extension     Shoulder ABDuction     Shoulder ADDUction     Elbow Flexion     Elbow Extension     Wrist Extension/Flexion      4/5 4/5   0/5 No palpable muscle contraction  1/5 Palpable muscle contraction, no joint movement  2-/5 Less than full range of motion in gravity eliminated position  2/5 Able to complete full range of motion in gravity eliminated position  2+/5 Able to initiate movement against gravity  3-/5 More than half but not full range of motion against gravity  3/5 Able to complete full range of motion against gravity  3+/5 Completes full range of motion against gravity with minimal resistance  4-/5 Completes full range of motion against gravity with minimal resistance  4/5 Completes full range of motion against gravity with moderate resistance  5/5 Completes full range of motion against gravity with maximum resistance    Sensation: intact  Coordination: intact    FIM SCORES Initial Assessment   Bladder - level of assist     Bladder - accident frequency score     Bowel - level of assist     Bowel - accident frequency score     Please see IRC Interdisciplinary Eval: Coordination/Balance Section for details regarding FIM score description.     COGNITION/PERCEPTION Initial Assessment   Reading Status Literate   Writing Status WNL   Arousal/Alertness alert    Orientation Level Oriented X4   Visual Fields WNL    Praxis Intact   Body Scheme Appears intact     COMPREHENSION MODE Initial Assessment   Primary Mode of Comprehension Auditory   Hearing Aide None   Corrective Lenses Glasses   Score       EXPRESSION Initial Assessment   Primary Mode of Expression Verbal   Score     Comments expresses her needs appropriately     SOCIAL INTERACTION/PRAGMATICS Initial Assessment   Score     Comments polite & nice     PROBLEM SOLVING Initial Assessment   Score     Comments she knew to don her shoes so not to slide on the floor with her socks on before we used RW to go from her bed to chair     MEMORY Initial Assessment   Score     Comments appears WNL with sharing her medical hx & family dynamics     EATING Initial Assessment   Functional Level 5   Comments setup on bedside table     GROOMING Initial Assessment   Functional Level 5    Oral Hygiene FIM:4   Tasks completed by patient Brushed hair, Brushed teeth, Washed face, Washed hands   Comments set up on bedside table     BATHING Initial Assessment   Functional Level 3   Body parts patient bathed Abdomen, Arm, left, Arm, right, Chest, Julia area, Thigh, left, Thigh, right   Comments seated on EOB with basin of water,soap & washcloth     TUB/SHOWER TRANSFER INDEPENDENCE Initial Assessment   Score 4   Comments uses rw & TTWB     UPPER BODY DRESSING/UNDRESSING Initial Assessment   Functional Level 4   Items applied/Steps completed Pullover (4 steps)   Comments set up while seated on EOB       LOWER BODY DRESSING/UNDRESSING Initial Assessment   Functional Level 3    Sock and/or Shoe Management FIM:3   Items applied/Steps completed Shoe, left (1 step), Shoe, right (1 step), Sock, left (1 step), Sock, right (1 step), Elastic waist pants (3 steps)   Comments min vc to direction for safety to stand & pull pants over hips     TOILETING Initial Assessment   Functional Level 3   Comments clothing management wearing scrubs due to not having her clothes here yet     TOILET TRANSFER INDEPENDENCE Initial Assessment   Transfer score 4   Comments rw     INSTRUMENTAL ADL Initial Assessment (PLOF)   Meal preparation To be assessed during rehab stay with recommendations to follow. She lives with her son & daughter-in-law    Homemaking     Medicine Management     Financial Management          ASSESSMENT :  Based on the objective data described above, the patient presents with decreased overall strength & endurance for her self care ADL's. She is motivated with tx & recovery. She asked for what she wanted and OT helped her sort through the various plastic bags that she had in her room. OT gave her each bag so she could take what she needed for her morning routine.   Pt stated that she needs regular street clothes for tx and not the dresses that her son brought for her. Pt states that she has had tx before and is familiar with tx process. She was receptive with information discussed. Pt would benefit from skilled occupational therapy in order to improve independent functional mobility/ADLs,/IADLs within the home. Interventions may include range of motion (AROM, PROM B UE), motor function (B UE/ strengthening/coordination), activity tolerance (vitals, oxygen saturation levels), balance training, ADL/IADL training and functional transfer training. Please see IRC; Interdisciplinary Eval, Care Plan, and Patient Education for further information regarding occupational therapy examination and plan of care. PLAN :  Recommendations and Planned Interventions:  [x]               Self Care Training                   [x]        Therapeutic Activities  [x]               Functional Mobility Training    []        Cognitive Retraining  [x]               Therapeutic Exercises            [x]        Endurance Activities  [x]               Balance Training                     []        Neuromuscular Re-Education  []               Visual/Perceptual Training      [x]   Home Safety Training  [x]               Patient Education                    [x]        Family Training/Education  []               Other (comment):    Frequency/Duration: Patient will be followed by occupational therapy 1-2 times per day/4-7 days per week to address goals. Discharge Recommendations: Home Health  Further Equipment Recommendations for Discharge: N/A     Please refer to the flow sheet for vital signs taken during this treatment.   After treatment:   [] Patient left in no apparent distress sitting up in chair  [] Patient left in no apparent distress in bed  [] Call bell left within reach  [] Nursing notified  [] Caregiver present  [] Bed alarm activated    COMMUNICATION/EDUCATION:   [] Home safety education was provided and the patient/caregiver indicated understanding. [x] Patient/family have participated as able in goal setting and plan of care. [] Patient/family agree to work toward stated goals and plan of care. [] Patient understands intent and goals of therapy, but is neutral about his/her participation. [] Patient is unable to participate in goal setting and plan of care. Order received from MD for occupational therapy services and chart reviewed. Pt to be seen 5 times per week for 3 hours of total therapy per day for 4 weeks.   Thank you for the referral.    Thank you for this referral.  Osbaldo Cain, OTR/L

## 2021-11-20 NOTE — ROUTINE PROCESS
SHIFT CHANGE NOTE FOR [facility]    Bedside and Verbal shift change report given to Ceci BRADY (oncoming nurse) by Velvet Lopez LPN (offgoing nurse). Report included the following information SBAR, Kardex, MAR and Recent Results.     Situation:   Code Status: DNR   Hospital Day: 0   Problem List:   Hospital Problems  Date Reviewed: 9/21/2021          Codes Class Noted POA    Multiple closed pelvic fractures without disruption of pelvic ring [address] ICD-10-CM: X50.17BN  ICD-9-CM: 808.44  11/19/2021 Unknown              Background:   Past Medical History:   Past Medical History:   Diagnosis Date    Acidosis     Anemia     Arteriovenous fistula (HCC)     Chronic kidney disease     on HD at [facility] on [address] on MWF. [phone]    Chronic pain     CKD (chronic kidney disease)     Diabetes ([address]     no meds now    ESRD (end stage renal disease) on dialysis ([address] 9/9/2021    HLD (hyperlipidemia)     HTN (hypertension)     Hyperparathyroidism due to renal insufficiency ([address]     Hypothyroid     Kidney stone     Lung mass     Recurrent UTI     Ureter, stricture     Uric acid nephrolithiasis     Urinary incontinence         Assessment:   Changes in Assessment throughout shift:  None     Patient has a central line: no Reasons if yes: na  Insertion date:na Last dressing date:na   Patient has Chua Cath: no Reasons if yes: na   Insertion date:na  Shift chua care completed: NO     Last Vitals:     Vitals:    11/19/21 1753   BP: 110/62   Pulse: 84   Resp: 16   Temp: 97 °F (36.1 °C)   SpO2: 95%        PAIN    Pain Assessment    Pain Intensity 1: 8 (11/19/21 1849) Pain Intensity 1: 2 (12/29/14 1105)    Pain Location 1: Hip Pain Location 1: Abdomen    Pain Intervention(s) 1: Medication (see MAR) Pain Intervention(s) 1: Medication (see MAR)  Patient Stated Pain Goal: 0 Patient Stated Pain Goal: 0  o Intervention effective: {yes   o Other actions taken for pain: yes     Skin Assessment  Skin color Skin Color: Appropriate for ethnicity  Condition/Temperature Skin Condition/Temp: Warm, Dry  Integrity Skin Integrity: Intact  Turgor    Weekly Pressure Ulcer Documentation    Wound Prevention & Protection Methods  Orientation of wound    Location of Prevention    Dressing Present    Dressing Status    Wound Offloading       INTAKE/OUPUT  Date 11/18/21 1900 - 11/19/21 0659(Not Admitted) 11/19/21 0700 - 11/20/21 0659   Shift 7010-2555 24 Hour Total 4523-3463 0102-4989 24 Hour Total   INTAKE   P.O.   120  120     P. O.   120  120   Shift Total   120  120   OUTPUT   Urine          Urine Occurrence(s)   1 x  1 x   Stool          Stool Occurrence(s)   0 x  0 x   Shift Total        NET   120  120   Weight (kg)            Recommendations:  1. Patient needs and requests: met    2. Pending tests/procedures: Labs    3. Functional Level/Equipment: wheelchair/     Fall Precautions:   Fall risk precautions were reinforced with the patient; she was instructed to call for help prior to getting up. The following fall risk precautions were continued: bed/ chair alarms, door signage, yellow bracelet and socks as well as update of the Gus Maisha tool in the patient's room. Freddy Score: 4    HEALS Safety Check    A safety check occurred in the patient's room between off going nurse and oncoming nurse listed above.     The safety check included the below items  Area Items   H  High Alert Medications - Verify all high alert medication drips (heparin, PCA, etc.)   E  Equipment - Suction is set up for ALL patients (with yanker)  - Red plugs utilized for all equipment (IV pumps, etc.)  - WOWs wiped down at end of shift.  - Room stocked with oxygen, suction, and other unit-specific supplies   A  Alarms - Bed alarm is set for fall risk patients  - Ensure chair alarm is in place and activated if patient is up in a chair   L  Lines - Check IV for any infiltration  - Cline bag is empty if patient has a Cline   - Tubing and IV bags are labeled   S  Safety - Room is clean, patient is clean, and equipment is clean. - Hallways are clear from equipment besides carts. - Fall bracelet on for fall risk patients  - Ensure room is clear and free of clutter  - Suction is set up for ALL patients (with adelaide)  - Hallways are clear from equipment besides carts.    - Isolation precautions followed, supplies available outside room, sign posted     Indy Grimaldo LPN

## 2021-11-20 NOTE — PROGRESS NOTES
Problem: Mobility Impaired (Adult and Pediatric)  Goal: *Therapy Goal (Edit Goal, Insert Text)  Description: Physical Therapy Short Term Goals  Initiated 11/20/2021 and to be accomplished within 7 day(s) on 11/27/2021  1. Patient will move from supine to sit and sit to supine , scoot up and down, and roll side to side in bed with supervision/set-up. 2.  Patient will transfer from bed to chair and chair to bed with moderate assistance  using the least restrictive device, consistently maintaining TTWB left LE. 3.  Patient will perform sit to stand with minimal to moderate assistance without additional assistance left LE to consistently maintain TTWB. 4.  Patient will perform w/c mobility SBA level over even surfaces for at least 200 ft before fatiguing. 5.  Patient will be able to perform static standing for at least 2 minute duration with 1-2 UE support before needing seated rest, maintaining TTWB left LE appropriately. Physical Therapy Long Term Goals  Initiated 11/20/2021 and to be accomplished within 28 day(s) 12/18/2021  1. Patient will move from supine to sit and sit to supine , scoot up and down, and roll side to side in bed with modified independence. 2.  Patient will transfer from bed to chair and chair to bed with modified independence using the least restrictive device. 3.  Patient will perform sit to stand with supervision/set-up. 4.  Patient will perform gait training if and when appropriate based on ability to consistently maintain TTWB left LE. 5.  Patient will perform w/c mobility mod I over even surfaces for at least 200 ft  6. Patient will negotiate w/c ramp with distant supervision.        Outcome: Progressing Towards Goal   PHYSICAL THERAPY EVALUATION    Patient: Reynaldo Diaz [de-identified]64 y.o. female)  Date: 11/20/2021  Diagnosis: Multiple closed pelvic fractures without disruption of pelvic ring (HCC) [S32.82XA] <principal problem not specified>  Precautions: Contact, TTWB, Fall, Skin (TTWB left LE)  Chart, physical therapy assessment, plan of care and goals were reviewed. Time in:1120  Time out:1205  Time In: 1330  Time Out: 1430    Patient seen for: Patient education; Therapeutic exercise; Transfer training; Wheelchair mobility; Balance activities    Pain:  Patient indicating significant pain at times during session with left LE movement, transitional movements and having to hold left LE up to maintain TTWB left LE. PT having to support left LE at times to ensure maintaining appropriate TTWB and not PWB left LE. Patient identified with name and :yes    SUBJECTIVE:     Patient stated I am fine as long as I don't move.  Patient reporting no pain at rest. Patient indicating that she is \"mad\" at herself for having initial fall off her bed when playing with her dog (pug), resulting in some limited movement left LE. Patient reported that she \"dragged\" her left LE around, was attempting to carry laundry (despite fact that she typically uses RW to walk), did not adequately clear threshold of door resulting in trip and fall forward, hitting her head on a cabinet in the process. Patient reports no loss of consciousness, bruising to her head with this fall, but had worsening ability to walk. States that her initial x-ray did not show a fracture on her hip, but with this worsening ability to move and increased pain, was referred back to ER and pelvic fracture found. Patient observed to become very upset following transfer from toilet (3:1 commode over toilet), stating, \". .If I stop getting dialysis this would all stop. .. I'm no spring chicken. \" Patient needing encouragement during therapy session due to patient observed to be upset and overwhelmed at times due to difficulty and pain with performing tasks.      Patient's Goal for Physical Therapy: patient hoping to be able to walk with RW    OBJECTIVE DATA SUMMARY:     Past Medical History:   Diagnosis Date    Acidosis     Anemia Arteriovenous fistula (HCC)     Chronic kidney disease     on HD at [address] on MWF. [phone]    Chronic pain     CKD (chronic kidney disease)     Diabetes ([facility] [address]     no meds now    ESRD (end stage renal disease) on dialysis ([facility] [address] 9/9/2021    HLD (hyperlipidemia)     HTN (hypertension)     Hyperparathyroidism due to renal insufficiency (HCC)     Hypothyroid     Kidney stone     Lung mass     Recurrent UTI     Ureter, stricture     Uric acid nephrolithiasis     Urinary incontinence      Past Surgical History:   Procedure Laterality Date    HX APPENDECTOMY      HX CHOLECYSTECTOMY      HX GASTRIC BYPASS      Gastric stapling    HX KNEE ARTHROSCOPY      HX UROLOGICAL      right PCN placement    HX UROLOGICAL  07/23/2018    RIGHT URETEROSCOPY WITH HOLMIUM LASER    IR EXCHANGE NEPHRO PERC LT SI  2/21/2020    IR EXCHANGE NEPHRO PERC RT SI  4/13/2020    IR EXCHANGE NEPHRO PERC RT SI  7/17/2020    IR NEPHROSTOMY PERC RT PLC CATH  SI  10/14/2020    IR NEPHROURETERAL PERC RT PLC CATH NEW ACCESS  SI  4/30/2020    [facility] INTRO CATH DIALYSIS CIRCUIT DX ANGRPH FLUOR S&I Left 9/24/2020    FISTULOGRAM LEFT/poss permanent catheter placement performed by Tanesha Navarro MD at [facility] CATH LAB    VASCULAR SURGERY PROCEDURE UNLIST      lef AVF       Problem List:    Decreased strength B LE, left LE limited s/p fracture  [x]     Decreased strength trunk/core  [x]     Decreased AROM   [x]     Decreased PROM  [x]    Decreased endurance  [x]     Decreased balance sitting  []     Decreased balance standing  [x]     Pain   [x]     Slow ambulation velocity (not able to assess safely due to difficulty maintaining TTWB left LE)  []    Decreased coordination  [x]    Decreased safety awareness  [x]      Functional Limitations:   Decreased independence with bed mobility  [x]     Decreased independence with functional transfers  [x]     Decreased independence with ambulation  [x]     Decreased independence with stair negotiation  [x] Previous Functional Level: Patient reported that she used a RW and rollator at home. States that she was independent with all her bed mobility, transfers and gait with devices and was taking a bus to get to her dialysis appointments three times a week without any additional help/support. Reports that her son and daughter-in-law work and are not able to help her during the day so she needs to be independent at home.      Home Environment: Home Environment: Private residence  # Steps to Enter: 2  One/Two Story Residence: Two story  # of Interior Steps: 12  Living Alone: No  Support Systems: Child(isaura)  Patient Expects to be Discharged to[de-identified] Rehabilitation facility  Current DME Used/Available at Home: Walker, rollator    Barriers to Learning/Limitations: yes;  emotional and physical  Compensate with: visual, verbal, tactile, kinesthetic cues/model         Outcome Measures: See functional scores     MMT Initial Assessment   Right Lower Extremity Left Lower Extremity: MMT not performed s/p pelvic fracture, scores based on observation   Hip Flexion 4+ 2+   Knee Extension 4+ 3   Knee Flexion 4+ 3   Ankle Dorsiflexion 4+ 3   0/5 No palpable muscle contraction  1/5 Palpable muscle contraction, no joint movement  2-/5 Less than full range of motion in gravity eliminated position  2/5 Able to complete full range of motion in gravity eliminated position  2+/5 Able to initiate movement against gravity  3-/5 More than half but not full range of motion against gravity  3/5 Able to complete full range of motion against gravity  3+/5 Completes full range of motion against gravity with minimal resistance  4-/5 Completes full range of motion against gravity with minimal-moderate resistance  4/5 Completes full range of motion against gravity with moderate resistance  4+/5 Completes full range of motion against gravity with moderate-maximum resistance  5/5 Completes full range of motion against gravity with maximum resistance        GROSS ASSESSMENT Initial Assessment 11/20/2021   AROM Grossly decreased, non-functional (left LE limited by pain and weakness, right LE [facility])   Strength Grossly decreased, non-functional (limited left LE due to pain, weakness, pelvic fx)   Coordination Within functional limits   Tone Normal   Sensation Intact (intact to light touch tank LE)   PROM Generally decreased, functional (left LE limited by pain hip flex )       POSTURE Initial Assessment 11/20/2021   Posture (WDL) Exceptions to WDL   Posture Assessment Rounded shoulders; Forward head       BALANCE Initial Assessment 11/20/2021    Sitting - Static: Good (unsupported)  Sitting - Dynamic: Good (unsupported)  Standing - Static: Fair; Occasional; Poor (using bilat UE support of RW, occasionally one UE support)  Standing - Dynamic : Impaired       BED/CHAIR/WHEELCHAIR TRANSFERS Initial Assessment 11/20/2021   Rolling Right 4 (Minimal assistance)   Rolling Left 4 (Minimal assistance)   Supine to Sit 3 (Moderate assistance) (flat head of bed, no railings, needing minimal trunk support)   Sit to Stand Moderate assistance (lifting assistance, support left LE to ensure TTWB)   Sit to Supine 3 (Moderate assistance) (head of bed flat, no rails, assist w/ left LE and reposition)   Transfer Assist Score  (mod/max A for stabilization and for maintaining TTWB left LE)   Transfer Type SPT with walker (mod/max A due to loss of balance and needing recovery)   Comments  Patient initially performing transfers with RW, observed to have inability to push down with bilat UE to clear right foot to take a step, only able to pivot/wiggle foot back and forth to turn to/from chair and commode. Patient needing additional assistance at times due to loss of balances observed and also assistance at left LE so that patient would not place entire foot on floor/partially weightbear on left LE.     Car Transfer Not tested   Car Type NT       WHEELCHAIR MOBILITY/MANAGEMENT Initial Assessment 11/20/2021   Able to Propel 230 feet   Assist Level 4   Curbs/ramps assistance required 0 (Not tested)   Wheelchair set up assistance required 2 (Maximal assistance)   Wheelchair management Manages left brake, Manages right brake (assistance with armrests, footrests)   Comments Min A for steering. GAIT Initial Assessment 11/20/2021   Gait Description (WDL) Exceptions to WDL   Gait Abnormalities  (Not able to assess safely with TTWB left LE)       WALKING INDEPENDENCE Initial Assessment 11/20/2021   Assistive device  (NT)   Ambulation assistance - level surface 0 (Not tested)   Distance 0 Feet (ft)   Comments  NT due to difficulty maintaining TTWB without help and inability to clear right LE from floor with support of bilat UE. Ambulation assistance - unlevel surface  (NT due to safety)       STEPS/STAIRS Initial Assessment 11/20/2021   Steps/Stairs ambulated 0   Rail Use  (NT)   Assistance Level 0 (Not tested)   Comments  NT due to safety   Curbs/Ramps  (NT due to safety)       Therapeutic Exercises:   Left LE hip flex in sitting x 10 reps to tolerance only, knee extension 2 x 10 reps. Right LE 2.5# 3 x 15 reps with 2-3 sec holds at end range for better ability to perform transfers. Repeated w/c pushups with moderate/maximal assistance at trunk, blocking right knee and left LE supported on elevated legrest 3 x 6 reps. Patient with significant difficulty clearing her bottom from cushion. ASSESSMENT :  Based on the objective data described above, the patient presents with decreased strength, endurance, balance, pain left hip/pelvis leading to difficulty performing safe and independent functional mobility. Patient unable to safely be assessed for gait at this time, and given her weightbearing status will likely require w/c upon discharge. Patient educated regarding need for w/c accessible home including a ramp for discharge, and handout provided for rental ramps.  PT to follow up with social worker as patient indicating concern about ability to have ramp in place due to the physical space of her son's home (reports that her son stated a ramp would not be possible to be placed in the past). Patient will benefit from skilled intervention to address the above impairments. Patient's rehabilitation potential is considered to be Fair  Factors which may influence rehabilitation potential include:   []         None noted  []         Mental ability/status  [x]         Medical condition  [x]         Home/family situation and support systems  [x]         Safety awareness  [x]         Pain tolerance/management  [x]         Other: TTWB left LE     PLAN :  See STG and LTG above. Order received from MD for physical therapy services and chart reviewed. Pt to be seen 5 times per week for 3 hours of total therapy per day for 4 weeks. Thank you for the referral.    Pt would benefit from skilled physical therapy in order to improve independent functional mobility within the home. Interventions may include range of motion (AROM, PROM B LE/trunk), motor function (B LE/trunk strengthening/coordination), activity tolerance (vitals, oxygen saturation levels), bed mobility training, balance activities, gait training (progressive ambulation program), wheelchair management and functional transfer training. Discharge Recommendations: Home Health  Further Equipment Recommendations for Discharge: wheelchair, elevating leg rest, wheelchair ramp, RW vs transfer board pending progress       Activity Tolerance:   Fair to poor due to fatigue, pain.      After treatment:   [] Patient left in no apparent distress in bed  [x] Patient left in no apparent distress sitting up in chair  [] Patient left in no apparent distress in w/c mobilizing under own power  [] Patient left in no apparent distress dining area  [] Patient left in no apparent distress mobilizing under own power  [x] Call bell left within reach  [x] Nursing notified  [] Caregiver present  [] Bed alarm activated   [x] Chair alarm activated. COMMUNICATION/EDUCATION:   [x]         Fall prevention education was provided and the patient/caregiver indicated understanding. [x]         Patient/family have participated as able in goal setting and plan of care. [x]         Patient/family agree to work toward stated goals and plan of care. []         Patient understands intent and goals of therapy, but is neutral about his/her participation. []         Patient is unable to participate in goal setting and plan of care.     Thank you for this referral.  Jesica Mckenzie, PT  11/20/2021

## 2021-11-20 NOTE — PROGRESS NOTES
SHIFT CHANGE NOTE FOR [facility]    Bedside and Verbal shift change report given to JC BHATT (oncoming nurse) by Andrzej Bailey LPN (offgoing nurse). Report included the following information SBAR, Kardex, MAR and Recent Results. Situation:   Code Status: DNR   Hospital Day: 1   Problem List:   Hospital Problems  Date Reviewed: 9/21/2021          Codes Class Noted POA    Multiple closed pelvic fractures without disruption of pelvic ring ([facility] [address] ICD-10-CM: O22.53QB  ICD-9-CM: 808.44  11/19/2021 Unknown              Background:   Past Medical History:   Past Medical History:   Diagnosis Date    Acidosis     Anemia     Arteriovenous fistula (HCC)     Chronic kidney disease     on HD at [facility] on [address] on MWF. [phone]    Chronic pain     CKD (chronic kidney disease)     Diabetes ([facility] [address]     no meds now    ESRD (end stage renal disease) on dialysis ([facility] [address] 9/9/2021    HLD (hyperlipidemia)     HTN (hypertension)     Hyperparathyroidism due to renal insufficiency ([facility] [address]     Hypothyroid     Kidney stone     Lung mass     Recurrent UTI     Ureter, stricture     Uric acid nephrolithiasis     Urinary incontinence         Assessment:   Changes in Assessment throughout shift: No change to previous assessment     Patient has a central line: no Reasons if yes:    Insertion date: Last dressing date:   Patient has Clien Cath: no Reasons if yes:     Insertion date:  Last Vitals:     Vitals:    11/20/21 0746 11/20/21 1210 11/20/21 1328 11/20/21 1618   BP: (!) 106/95 132/71  (!) 137/53   Pulse: 75 65  71   Resp: 14   12   Temp: 96.9 °F (36.1 °C)   97.4 °F (36.3 °C)   SpO2: 98%   98%   Height:   5' 2\" (1.575 m)         PAIN    Pain Assessment    Pain Intensity 1: 0 (11/20/21 1600) Pain Intensity 1: 2 (12/29/14 1105)    Pain Location 1: Hip Pain Location 1: Abdomen    Pain Intervention(s) 1: Medication (see MAR) Pain Intervention(s) 1: Medication (see MAR)  Patient Stated Pain Goal: 0 Patient Stated Pain Goal: 0  o Intervention effective: yes  o Other actions taken for pain:       Skin Assessment  Skin color Skin Color: Appropriate for ethnicity  Condition/Temperature Skin Condition/Temp: Dry, Warm  Integrity Skin Integrity: Intact  Turgor Turgor: Non-tenting  Weekly Pressure Ulcer Documentation  Pressure  Injury Documentation: No Pressure Injury Noted-Pressure Ulcer Prevention Initiated  Wound Prevention & Protection Methods  Orientation of wound Orientation of Wound Prevention: Posterior  Location of Prevention Location of Wound Prevention: Buttocks, Sacrum/Coccyx  Dressing Present Dressing Present : No  Dressing Status    Wound Offloading Wound Offloading (Prevention Methods): Bed, pressure redistribution/air, Chair cushion, Pillows, Repositioning     INTAKE/OUPUT  Date 11/19/21 0700 - 11/20/21 0659 11/20/21 0700 - 11/21/21 0659   Shift 7758-7056 0923-2394 24 Hour Total 5904-9547 8848-1826 24 Hour Total   INTAKE   P.O. 120  120 120  120     P. O. 120  120 120  120   Shift Total 120  120 120  120   OUTPUT   Urine           Urine Occurrence(s) 1 x 0 x 1 x      Emesis/NG output           Emesis Occurrence(s)  0 x 0 x      Stool           Stool Occurrence(s) 1 x 0 x 1 x      Shift Total           120 120  120   Weight (kg)             Recommendations:  1. Patient needs and requests: TOILETING    2. Pending tests/procedures: LABS PENDING     3. Functional Level/Equipment: Partial (one person) /      Fall Precautions:   Fall risk precautions were reinforced with the patient; she was instructed to call for help prior to getting up. The following fall risk precautions were continued: bed/ chair alarms, door signage, yellow bracelet and socks as well as update of the Tushar Donaldson tool in the patient's room. Freddy Score: 4    HEALS Safety Check    A safety check occurred in the patient's room between off going nurse and oncoming nurse listed above.     The safety check included the below items  Area Items   H  High Alert Medications - Verify all high alert medication drips (heparin, PCA, etc.)   E  Equipment - Suction is set up for ALL patients (with adelaide)  - Red plugs utilized for all equipment (IV pumps, etc.)  - WOWs wiped down at end of shift.  - Room stocked with oxygen, suction, and other unit-specific supplies   A  Alarms - Bed alarm is set for fall risk patients  - Ensure chair alarm is in place and activated if patient is up in a chair   L  Lines - Check IV for any infiltration  - Cline bag is empty if patient has a Cline   - Tubing and IV bags are labeled   S  Safety   - Room is clean, patient is clean, and equipment is clean. - Hallways are clear from equipment besides carts. - Fall bracelet on for fall risk patients  - Ensure room is clear and free of clutter  - Suction is set up for ALL patients (with adelaide)  - Hallways are clear from equipment besides carts.    - Isolation precautions followed, supplies available outside room, sign posted     Silviano Sterling LPN

## 2021-11-20 NOTE — ROUTINE PROCESS
Meme España is a 64 y.o.  female admitted on 11/19/2021 from [address]. Report received from [place]. Transportation was provided Transportation, and the patient was transferred to her room via [facility]. Patient was assisted to bed with assist of 1. The patient was oriented to her room. Fall risk precautions were discussed with the patient; she was instructed to call for help prior to getting up. The following fall risk precautions were initiated: bed/ chair alarms, door signage, yellow bracelet and socks as well as completion of the Starleen Freeze tool in the patient's room. Four eyes nurse skin assessment was performed by Ceci BRADY and Romelia Paredes LPN.  Skin problems noted: MARYJANE Sanchez LPN

## 2021-11-20 NOTE — ROUTINE PROCESS
SHIFT CHANGE NOTE FOR [facility]    Bedside and Verbal shift change report given to MILLER SAM (oncoming nurse) by Nancy Alaniz RN (offgoing nurse). Report included the following information SBAR, Kardex, MAR and Recent Results.     Situation:   Code Status: DNR   Hospital Day: 1   Problem List:   Hospital Problems  Date Reviewed: 9/21/2021          Codes Class Noted POA    Multiple closed pelvic fractures without disruption of pelvic ring ([facility] [address] ICD-10-CM: L82.66KC  ICD-9-CM: 808.44  11/19/2021 Unknown              Background:   Past Medical History:   Past Medical History:   Diagnosis Date    Acidosis     Anemia     Arteriovenous fistula (HCC)     Chronic kidney disease     on HD at [facility] on [address] on MWF. [phone]    Chronic pain     CKD (chronic kidney disease)     Diabetes ([address]     no meds now    ESRD (end stage renal disease) on dialysis ([address] 9/9/2021    HLD (hyperlipidemia)     HTN (hypertension)     Hyperparathyroidism due to renal insufficiency ([address]     Hypothyroid     Kidney stone     Lung mass     Recurrent UTI     Ureter, stricture     Uric acid nephrolithiasis     Urinary incontinence         Assessment:   Changes in Assessment throughout shift: No change to previous assessmentNone     Patient has a central line: no Reasons if yes: na  Insertion date:na Last dressing date:na   Patient has Chua Cath: no Reasons if yes: na   Insertion date:na  Shift chua care completed: NO     Last Vitals:     Vitals:    11/19/21 1753 11/19/21 2049   BP: 110/62 128/89   Pulse: 84 90   Resp: 16 18   Temp: 97 °F (36.1 °C) 98.2 °F (36.8 °C)   SpO2: 95% 96%        PAIN    Pain Assessment    Pain Intensity 1: 0 (11/20/21 0403) Pain Intensity 1: 2 (12/29/14 1105)    Pain Location 1: Hip Pain Location 1: Abdomen    Pain Intervention(s) 1: Medication (see MAR) Pain Intervention(s) 1: Medication (see MAR)  Patient Stated Pain Goal: 0 Patient Stated Pain Goal: 0  o Intervention effective: {yes o Other actions taken for pain: yes     Skin Assessment  Skin color Skin Color: Appropriate for ethnicity  Condition/Temperature Skin Condition/Temp: Dry, Warm  Integrity Skin Integrity: Intact  Turgor Turgor: Non-tenting  Weekly Pressure Ulcer Documentation  Pressure  Injury Documentation: No Pressure Injury Noted-Pressure Ulcer Prevention Initiated  Wound Prevention & Protection Methods  Orientation of wound Orientation of Wound Prevention: Posterior  Location of Prevention Location of Wound Prevention: Sacrum/Coccyx  Dressing Present Dressing Present : No  Dressing Status    Wound Offloading Wound Offloading (Prevention Methods): Bed, pressure reduction mattress, Elevate heels, Pillows, Repositioning     INTAKE/OUPUT  Date 11/19/21 0700 - 11/20/21 0659 11/20/21 0700 - 11/21/21 0659   Shift 6855-3776 8006-9866 24 Hour Total 7459-4531 8748-6185 24 Hour Total   INTAKE   P.O. 120  120        P. O. 120  120      Shift Total 120  120      OUTPUT   Urine           Urine Occurrence(s) 1 x 0 x 1 x      Emesis/NG output           Emesis Occurrence(s)  0 x 0 x      Stool           Stool Occurrence(s) 1 x 0 x 1 x      Shift Total           120      Weight (kg)             Recommendations:  1. Patient needs and requests: met    2. Pending tests/procedures: Labs    3. Functional Level/Equipment: wheelchair/     Fall Precautions:   Fall risk precautions were reinforced with the patient; she was instructed to call for help prior to getting up. The following fall risk precautions were continued: bed/ chair alarms, door signage, yellow bracelet and socks as well as update of the Yanni Ocampothal tool in the patient's room. Freddy Score: 4    HEALS Safety Check    A safety check occurred in the patient's room between off going nurse and oncoming nurse listed above.     The safety check included the below items  Area Items   H  High Alert Medications - Verify all high alert medication drips (heparin, PCA, etc.)   E  Equipment - Suction is set up for ALL patients (with adelaide)  - Red plugs utilized for all equipment (IV pumps, etc.)  - WOWs wiped down at end of shift.  - Room stocked with oxygen, suction, and other unit-specific supplies   A  Alarms - Bed alarm is set for fall risk patients  - Ensure chair alarm is in place and activated if patient is up in a chair   L  Lines - Check IV for any infiltration  - Cline bag is empty if patient has a Cline   - Tubing and IV bags are labeled   S  Safety   - Room is clean, patient is clean, and equipment is clean. - Hallways are clear from equipment besides carts. - Fall bracelet on for fall risk patients  - Ensure room is clear and free of clutter  - Suction is set up for ALL patients (with adelaide)  - Hallways are clear from equipment besides carts.    - Isolation precautions followed, supplies available outside room, sign posted     Alan Ramirez RN

## 2021-11-20 NOTE — CONSULTS
Comprehensive Nutrition Assessment    Type and Reason for Visit: Initial, Consult    Nutrition Recommendations/Plan:   - Continue current diet and Magic Cup TID. Monitor po intake. Nutrition Assessment:  Magic cup supplementation continued on admission to ARU. Intake remains fair. Malnutrition Assessment:  Malnutrition Status: At risk for malnutrition (specify) (Inadequate energy intake PTA.)      Nutrition History and Allergies: PMH: acidosis, anemia, ESRD on HD, DM, HLD, HTN, hyperparathyroidism due to renal insufficiency, hypothyroid, kidney stone, recurrent UTI, appendectomy, cholecystectomy, hx of gastric bypass. Patient's weight has been in the 200 lb range recently; weighed 250 lb about 1.5 years ago. Pt reported weighing 93 kg (204.6 lb) PTA, UBW after fluid is removed during dialysis. Fair appetite/ po intake PTA; eats small meals. Fair intake in hospital, started on Magic Cup 11/18. NKFA. Estimated Daily Nutrient Needs:  Energy (kcal): 4141-2189; Weight Used for Energy Requirements: Other (specify) (SBW 82 kg)  Protein (g): 82-98; Weight Used for Protein Requirements: Other (specify) (1-1.2)  Fluid (ml/day): 750-1500; Method Used for Fluid Requirements: Standard renal      Nutrition Related Findings:  BM 11/19. Some nausea. Wounds:    None       Current Nutrition Therapies:  ADULT DIET Regular; No coffee and eggs. LIKES hot tea, salads, tuna salad and chicken salad  ADULT ORAL NUTRITION SUPPLEMENT Breakfast, Lunch, Dinner; Frozen Supplement    Anthropometric Measures:  · Height:  5' 2\" (157.5 cm)  · Current Body Wt:  94.8 kg (208 lb 15.9 oz) (11/19/21)   · Usual Body Wt:  93 kg (205 lb)     · Ideal Body Wt:  110 lbs:  190 %   · BMI Category:  Obese class 2 (BMI 35.0-39. 9)       Nutrition Diagnosis:   · Inadequate oral intake related to acute injury/trauma (nausea) as evidenced by intake 26-50%      Nutrition Interventions:   Food and/or Nutrient Delivery: Continue current diet, Continue oral nutrition supplement  Nutrition Education and Counseling: No recommendations at this time, Education not indicated  Coordination of Nutrition Care: Continue to monitor while inpatient    Goals:  PO nutrition intake will meet >75% of patient estimated nutritional needs within the next 7 days.        Nutrition Monitoring and Evaluation:   Behavioral-Environmental Outcomes: None identified  Food/Nutrient Intake Outcomes: Food and nutrient intake, Supplement intake  Physical Signs/Symptoms Outcomes: Biochemical data, Nausea/vomiting, Meal time behavior, Nutrition focused physical findings    Discharge Planning:    Continue oral nutrition supplement, Continue current diet     Electronically signed by Avni Underwood RD on 11/20/2021 at 1:35 PM    Contact: [phone]

## 2021-11-20 NOTE — H&P
[facility] PHYSICAL REHABILITATION  [address] [place] [address], [address]     INPATIENT REHABILITATION  HISTORY AND PHYSICAL  (Post Admission Physician Evaluation)    Name: Jonna Fatima CSN: [id number]   Age: 64 y.o. MRN: [id number]   Sex: female Admit Date: 11/19/2021       Subjective:     Patient seen and examined. Jonna Fatima is a 64 y.o.  female with multiple medical problems who presented to [facility] ER on 11/12/21 for left sided hip and leg pain following a fall a week prior. She had xrays after initial fall were negative for fracture but pain progressively worsened and was unable to bear weight. HR was 170s on arrival.  Her CT Lt hip showed comminuted fracture involving the left anterior acetabular wall with involvement of the base of the left superior pubic ramus whic are not signficiantly displaced. Also nondisplaced left inferior pubic ramus fracture. L knee xray no acute fracture. Cardiology was consulted due to tachycardia and afib as well as hypotension. Pt was admitted to family medicine under Dr. Candance Chad. Ortho consulted, determined that no surgical intervention is indicated recommended PT and pain control. Nephrology Dr Marilee Lamb was consulted for HD and management of ESRD. She was found to have UTI. The patient had remained hemodynamically stable but due to the above events, the patient was noted to have impaired mobility and ADLs. Patient was felt to be a good candidate for acute inpatient rehabilitation. Upon evaluation by Physical Therapy and Occupational Therapy, the patient was recommended for acute inpatient rehabilitation. The patient was discharged and was subsequently admitted to the [facility] for intensive rehabilitation to help recover strength, function and mobility. Patient accompanied by daughter-in-law today.   Complains of severe migraine like headache when receiving albumin with dialysis as well as itchy watery eyes, runny nose and rash when receives this. Has spoken to Dr. Kathie Cisneros about this and reports he told her they can try something else. Unsure if this is true allergy for her however she and her daughter in law desire this to be considered allergy so she does not get this again. Will update allergy list and include patient reported sytmpoms. Will add antihistamine to help with runny nose and itchy eyes.        Past Medical History:   Diagnosis Date    Acidosis     Anemia     Arteriovenous fistula (HCC)     Chronic kidney disease     on HD at [facility] on [address] on MWF. [phone]    Chronic pain     CKD (chronic kidney disease)     Diabetes ([facility] [address]     no meds now    ESRD (end stage renal disease) on dialysis ([address] 9/9/2021    HLD (hyperlipidemia)     HTN (hypertension)     Hyperparathyroidism due to renal insufficiency ([address]     Hypothyroid     Kidney stone     Lung mass     Recurrent UTI     Ureter, stricture     Uric acid nephrolithiasis     Urinary incontinence        Past Surgical History:   Procedure Laterality Date    HX APPENDECTOMY      HX CHOLECYSTECTOMY      HX GASTRIC BYPASS      Gastric stapling    HX KNEE ARTHROSCOPY      HX UROLOGICAL      right PCN placement    HX UROLOGICAL  07/23/2018    RIGHT URETEROSCOPY WITH HOLMIUM LASER    IR EXCHANGE NEPHRO PERC LT SI  2/21/2020    IR EXCHANGE NEPHRO PERC RT SI  4/13/2020    IR EXCHANGE NEPHRO PERC RT SI  7/17/2020    IR NEPHROSTOMY PERC RT PLC CATH  SI  10/14/2020    IR NEPHROURETERAL PERC RT PLC CATH NEW ACCESS  SI  4/30/2020    [place] INTRO CATH DIALYSIS CIRCUIT DX ANGRPH FLUOR S&I Left 9/24/2020    FISTULOGRAM LEFT/poss permanent catheter placement performed by Collin Cordero MD at [facility] CATH LAB    VASCULAR SURGERY PROCEDURE UNLIST      lef AVF       Family History   Problem Relation Age of Onset    Heart Surgery Sister        Social History     Socioeconomic History    Marital status: SINGLE   Tobacco Use    Smoking status: Never Smoker    Smokeless tobacco: Never Used   Vaping Use    Vaping Use: Never used   Substance and Sexual Activity    Alcohol use: Never    Drug use: Never       Prior to Admission medications    Medication Sig Start Date End Date Taking? Authorizing Provider   amiodarone (CORDARONE) 200 mg tablet Take 1 Tablet by mouth daily. 11/20/21   Sanjana Lanier MD   midodrine (PROAMATINE) 10 mg tablet Take 1 Tablet by mouth three (3) times daily (with meals) for 30 days. 11/19/21 12/19/21  Lars Suresh MD   cefpodoxime (VANTIN) 200 mg tablet Take 1 Tablet by mouth every evening. 11/20/21   Lars Suresh MD   acetaminophen (Tylenol Extra Strength) 500 mg tablet Take 1 Tablet by mouth every six (6) hours as needed for Pain. 11/7/21   Dulce Murray MD   lidocaine (LIDODERM) 5 % Apply patch to the affected area for 12 hours a day and remove for 12 hours a day. 11/7/21   Magdy Santillan MD   gabapentin (NEURONTIN) 100 mg capsule Take 2 Capsules by mouth Three (3) times a week. Max Daily Amount: 200 mg. Take 2 capsules by mouth 3 times a week as needed for pain post dialysis. Indications: concern for back pain post dialysis 10/15/21   Odilia Chandra MD   apixaban (ELIQUIS) 5 mg tablet Take 1 Tablet by mouth two (2) times a day. 10/14/21   Drew Guillen MD   HYDROmorphone (DILAUDID) 2 mg tablet Take 1 mg by mouth every twelve (12) hours as needed for Pain. 9/16/21   Provider, Historical   meclizine (ANTIVERT) 25 mg tablet Take 25 mg by mouth three (3) times daily as needed for Dizziness. 3/3/21   Provider, Historical   methoxy peg-epoetin beta [facility] INJECTION) 30 mcg. 9/20/21 9/19/22  Provider, Historical   DULoxetine (Cymbalta) 20 mg capsule Take 20 mg by mouth daily. Provider, Historical   b complex vitamins (Vitamins B Complex) tablet Take 1 Tab by mouth daily.     Provider, Historical   estradioL (Estrace) 0.01 % (0.1 mg/gram) vaginal cream Apply a fingertip amount around the urethra three times a week. 9/30/20   Tangela Ventura MD   biotin 1,000 mcg chew Take 1 Tab by mouth daily. Provider, Historical   cyanocobalamin 1,000 mcg tablet Take 1,000 mcg by mouth daily. Provider, Historical   lactobacillus sp. 50 billion cpu (BIO-K PLUS) 50 billion cell -375 mg cap capsule Take 1 Cap by mouth daily. 2/25/20   Wily Santo MD   allopurinoL (ZYLOPRIM) 100 mg tablet Take 100 mg by mouth daily. Lexus Hogan MD   ascorbic acid, vitamin C, (VITAMIN C) 500 mg tablet Take 500 mg by mouth daily. Lexus Hogan MD   calcitRIOL (ROCALTROL) 0.25 mcg capsule Take 0.25 mcg by mouth daily. Lexus Hogan MD   cholecalciferol (VITAMIN D3) (2,000 UNITS /50 MCG) cap capsule Take 2,000 Units by mouth two (2) times a day. Take two tabs a total of 4000 units    Lexus Hogan MD   latanoprost (XALATAN) 0.005 % ophthalmic solution Administer 1 Drop to both eyes nightly. One drop at bedtime    Lexus Hogan MD   levothyroxine (SYNTHROID) 125 mcg tablet Take 125 mcg by mouth Daily (before breakfast). Lexus Hogan MD   omeprazole (PRILOSEC) 20 mg capsule Take 20 mg by mouth daily. Lexus Hogan MD   ondansetron (ZOFRAN ODT) 4 mg disintegrating tablet Take 4 mg by mouth every eight (8) hours as needed for Nausea or Vomiting. Lexus Hogan MD   vit B Cmplx 3-FA-Vit C-Biotin (NEPHRO HOWIE RX) 1- mg-mg-mcg tablet Take 1 Tab by mouth daily. Lexus Hogan MD       Allergies   Allergen Reactions    Ciprofloxacin Hives    Cyclopentolate Unknown (comments)    Iron Sucrose Diarrhea    Statins-Hmg-Coa Reductase Inhibitors Other (comments)     Body ache       Review of Systems  GENERAL: Patient alert, awake and oriented times 3, able to communicate full sentences and not in distress. HEENT: No change in vision, no earache, tinnitus, sore throat or sinus congestion. NECK: No pain or stiffness. CARDIOVASCULAR: No chest pain or pressure. No palpitations.    PULMONARY: No shortness of breath, cough or wheeze. GASTROINTESTINAL: No abdominal pain, nausea, vomiting or diarrhea, melena or       bright red blood per rectum.  +constipation  GENITOURINARY: No urinary frequency, urgency. +foul smelling urine. MUSCULOSKELETAL: pelvic pain hip pain fair control on current regimen. DERMATOLOGIC: reports rash develops after having albumin infusion. ENDOCRINE: No polyuria, polydipsia, no heat or cold intolerance. No recent change in    weight. HEMATOLOGICAL: No anemia or easy bruising or bleeding. NEUROLOGIC: No headache, seizures, numbness, tingling or weakness. Physical Exam:     Physical Exam:  Visit Vitals  /71   Pulse 65   Temp 96.9 °F (36.1 °C)   Resp 14   Ht 5' 2\" (1.575 m)   SpO2 98%   BMI 38.24 kg/m²           Temp (24hrs), Av.4 °F (36.3 °C), Min:96.9 °F (36.1 °C), Max:98.2 °F (36.8 °C)    701 - 1900  In: 120 [P.O.:120]  Out: -    1901 - 700  In: 120 [P.O.:120]  Out: -     General:  Alert, cooperative, no distress, appears stated age. Head:  Normocephalic, without obvious abnormality, atraumatic. Eyes:  Conjunctivae/corneas clear. Neck: Supple, symmetrical, trachea midline   Back:   ROM normal. No CVA tenderness. Lungs:   Clear to auscultation bilaterally. Chest wall:  No tenderness or deformity. Heart:  iregularly irregular, II/VI systolic murmur, click, rub or gallop. Abdomen: Soft, non-tender. Bowel sounds normal. No masses,  No organomegaly. Extremities: no cyanosis or edema. Pulses: 2+ and symmetric all extremities. Neurologic: CNII-XII intact. No focal motor or sensory deficit.          Procedures/imaging: see electronic medical records for all procedures/Xrays and details which were not copied into this note but were reviewed prior to creation of Plan        Assessment/Plan     Active Problems:    Multiple closed pelvic fractures without disruption of pelvic ring ([facility] [address] (2021)         Impaired mobility and ADLs due to multiple closed pelvic fractures (comminuted fracture involving the L anterior acetabular wall w/involvement of the base of the left superior pubic ramus w/o significant displacement). Orthopedic surgery recommending non-surgical management with PT, pain control    UTI, history of R ureteral stent  Started on IV ceftriaxone and transitioned to cefpodoxime, last dose 11/25/2021  Follow up with urology on discharge from ARU    Paroxysmal Afib  Continue eliquis, amiodarone  Monitor HR and signs of bleeding    Chronic back pain  Continue 200mg gabapentin after HD, continue dilaudid 1mg q8 prn    Hypothyroid  Continue levothyroxine 125mcg daily  11/13/21 TSH 11.4, recheck    Chronic hypotension  Continue midodrine 10mg tid, monitor bp    ESRD on HD MWF  Continue HD per nephrology. DM2  A1c 10/8/21 4.6  Diet controlled    Depression  Continue cymbalta 20mg daily    ? Allergy to albumin  Will list as allergy per pt and daughter-in-law request.  Start antihistamine. Monitor.     DVT/GI Prophylaxis: aichaiqudagoberto, protonix      Aman Villa MD  11/20/2021

## 2021-11-20 NOTE — PROGRESS NOTES
Problem: Risk for Spread of Infection  Goal: Prevent transmission of infectious organism to others  Description: Prevent the transmission of infectious organisms to other patients, staff members, and visitors. Outcome: Progressing Towards Goal     Problem: Falls - Risk of  Goal: *Absence of Falls  Description: Document Denny Jesus Fall Risk and appropriate interventions in the flowsheet. Outcome: Progressing Towards Goal  Note: Fall Risk Interventions:  Mobility Interventions: Assess mobility with egress test, Bed/chair exit alarm, Patient to call before getting OOB, PT Consult for assist device competence, Strengthening exercises (ROM-active/passive), Utilize walker, cane, or other assistive device, Utilize gait belt for transfers/ambulation    Mentation Interventions: Bed/chair exit alarm, Gait belt with transfers/ambulation, HELP ([address]) if available, Increase mobility, More frequent rounding, Room close to nurse's station    Medication Interventions: Bed/chair exit alarm, Evaluate medications/consider consulting pharmacy, Patient to call before getting OOB, Teach patient to arise slowly, Utilize gait belt for transfers/ambulation    Elimination Interventions: Bed/chair exit alarm, Call light in reach, Patient to call for help with toileting needs, Stay With Me (per policy), Toileting schedule/hourly rounds    History of Falls Interventions: Bed/chair exit alarm, Door open when patient unattended, Room close to nurse's station, Utilize gait belt for transfer/ambulation         Problem: Pressure Injury - Risk of  Goal: *Prevention of pressure injury  Description: Document Giovany Scale and appropriate interventions in the flowsheet.   Outcome: Progressing Towards Goal  Note: Pressure Injury Interventions:  Sensory Interventions: Assess changes in LOC, Assess need for specialty bed, Chair cushion, Float heels, Maintain/enhance activity level, Minimize linen layers, Pad between skin to skin, Pressure redistribution bed/mattress (bed type), Turn and reposition approx.  every two hours (pillows and wedges if needed)    Moisture Interventions: Apply protective barrier, creams and emollients, Absorbent underpads, Limit adult briefs, Maintain skin hydration (lotion/cream), Minimize layers, Moisture barrier, Offer toileting Q_hr    Activity Interventions: Assess need for specialty bed, Chair cushion, Increase time out of bed, Pressure redistribution bed/mattress(bed type), PT/OT evaluation    Mobility Interventions: Assess need for specialty bed, Chair cushion, Float heels, PT/OT evaluation, Pressure redistribution bed/mattress (bed type), HOB 30 degrees or less    Nutrition Interventions: Document food/fluid/supplement intake, Discuss nutritional consult with provider

## 2021-11-21 PROBLEM — F32.A DEPRESSION: Status: ACTIVE | Noted: 2021-11-21

## 2021-11-21 PROCEDURE — 97110 THERAPEUTIC EXERCISES: CPT

## 2021-11-21 PROCEDURE — 97530 THERAPEUTIC ACTIVITIES: CPT

## 2021-11-21 PROCEDURE — 74011000250 HC RX REV CODE- 250: Performed by: INTERNAL MEDICINE

## 2021-11-21 PROCEDURE — 65310000000 HC RM PRIVATE REHAB

## 2021-11-21 PROCEDURE — 74011250637 HC RX REV CODE- 250/637: Performed by: INTERNAL MEDICINE

## 2021-11-21 PROCEDURE — 74011250637 HC RX REV CODE- 250/637: Performed by: FAMILY MEDICINE

## 2021-11-21 RX ORDER — ALLOPURINOL 100 MG/1
100 TABLET ORAL
Status: DISCONTINUED | OUTPATIENT
Start: 2021-11-22 | End: 2021-12-04 | Stop reason: HOSPADM

## 2021-11-21 RX ADMIN — CYANOCOBALAMIN TAB 1000 MCG 1000 MCG: 1000 TAB at 08:19

## 2021-11-21 RX ADMIN — HYDROMORPHONE HYDROCHLORIDE 1 MG: 2 TABLET ORAL at 20:38

## 2021-11-21 RX ADMIN — MIDODRINE HYDROCHLORIDE 10 MG: 5 TABLET ORAL at 08:19

## 2021-11-21 RX ADMIN — POLYETHYLENE GLYCOL 3350 17 G: 17 POWDER, FOR SOLUTION ORAL at 08:20

## 2021-11-21 RX ADMIN — CHOLECALCIFEROL TAB 25 MCG (1000 UNIT) 2000 UNITS: 25 TAB at 17:05

## 2021-11-21 RX ADMIN — APIXABAN 5 MG: 5 TABLET, FILM COATED ORAL at 08:20

## 2021-11-21 RX ADMIN — ACETAMINOPHEN 650 MG: 325 TABLET ORAL at 08:19

## 2021-11-21 RX ADMIN — DULOXETINE HYDROCHLORIDE 20 MG: 20 CAPSULE, DELAYED RELEASE ORAL at 08:20

## 2021-11-21 RX ADMIN — Medication 1 CAPSULE: at 08:19

## 2021-11-21 RX ADMIN — CEFPODOXIME PROXETIL 200 MG: 200 TABLET, FILM COATED ORAL at 18:22

## 2021-11-21 RX ADMIN — PANTOPRAZOLE SODIUM 20 MG: 20 TABLET, DELAYED RELEASE ORAL at 06:51

## 2021-11-21 RX ADMIN — ACETAMINOPHEN 650 MG: 325 TABLET ORAL at 12:24

## 2021-11-21 RX ADMIN — APIXABAN 5 MG: 5 TABLET, FILM COATED ORAL at 17:05

## 2021-11-21 RX ADMIN — ACETAMINOPHEN 650 MG: 325 TABLET ORAL at 17:05

## 2021-11-21 RX ADMIN — CHOLECALCIFEROL TAB 25 MCG (1000 UNIT) 2000 UNITS: 25 TAB at 08:19

## 2021-11-21 RX ADMIN — ALLOPURINOL 100 MG: 100 TABLET ORAL at 08:20

## 2021-11-21 RX ADMIN — MIDODRINE HYDROCHLORIDE 10 MG: 5 TABLET ORAL at 17:05

## 2021-11-21 RX ADMIN — CETIRIZINE HYDROCHLORIDE 10 MG: 10 TABLET, FILM COATED ORAL at 08:19

## 2021-11-21 RX ADMIN — Medication 1 TABLET: at 08:19

## 2021-11-21 RX ADMIN — MIDODRINE HYDROCHLORIDE 10 MG: 5 TABLET ORAL at 12:24

## 2021-11-21 RX ADMIN — Medication 500 MG: at 08:19

## 2021-11-21 RX ADMIN — AMIODARONE HYDROCHLORIDE 200 MG: 200 TABLET ORAL at 08:19

## 2021-11-21 RX ADMIN — ACETAMINOPHEN 650 MG: 325 TABLET ORAL at 04:52

## 2021-11-21 RX ADMIN — LEVOTHYROXINE SODIUM 125 MCG: 125 TABLET ORAL at 05:28

## 2021-11-21 RX ADMIN — LATANOPROST 1 DROP: 50 SOLUTION OPHTHALMIC at 20:41

## 2021-11-21 NOTE — PROGRESS NOTES
Problem: Risk for Spread of Infection  Goal: Prevent transmission of infectious organism to others  Description: Prevent the transmission of infectious organisms to other patients, staff members, and visitors. Outcome: Progressing Towards Goal     Problem: Falls - Risk of  Goal: *Absence of Falls  Description: Document Yajaira James Fall Risk and appropriate interventions in the flowsheet. Outcome: Progressing Towards Goal  Note: Fall Risk Interventions:  Mobility Interventions: Assess mobility with egress test, Bed/chair exit alarm, Patient to call before getting OOB, PT Consult for mobility concerns, Strengthening exercises (ROM-active/passive), Utilize walker, cane, or other assistive device, Utilize gait belt for transfers/ambulation    Mentation Interventions: Bed/chair exit alarm, Gait belt with transfers/ambulation, HELP ([address]) if available, Increase mobility, More frequent rounding, Room close to nurse's station    Medication Interventions: Bed/chair exit alarm, Evaluate medications/consider consulting pharmacy, Patient to call before getting OOB, Teach patient to arise slowly, Utilize gait belt for transfers/ambulation    Elimination Interventions: Bed/chair exit alarm, Call light in reach, Patient to call for help with toileting needs, Stay With Me (per policy)    History of Falls Interventions: Bed/chair exit alarm, Door open when patient unattended, Room close to nurse's station, Utilize gait belt for transfer/ambulation         Problem: Pressure Injury - Risk of  Goal: *Prevention of pressure injury  Description: Document Giovany Scale and appropriate interventions in the flowsheet.   Outcome: Progressing Towards Goal  Note: Pressure Injury Interventions:  Sensory Interventions: Assess changes in LOC, Assess need for specialty bed, Chair cushion, Discuss PT/OT consult with provider, Float heels, Maintain/enhance activity level, Pad between skin to skin, Monitor skin under medical devices, Pressure redistribution bed/mattress (bed type)    Moisture Interventions: Absorbent underpads, Apply protective barrier, creams and emollients, Assess need for specialty bed, Limit adult briefs, Maintain skin hydration (lotion/cream), Minimize layers, Moisture barrier    Activity Interventions: Assess need for specialty bed, Chair cushion, Increase time out of bed, Pressure redistribution bed/mattress(bed type), PT/OT evaluation    Mobility Interventions: Assess need for specialty bed, Chair cushion, Float heels, HOB 30 degrees or less, Pressure redistribution bed/mattress (bed type), PT/OT evaluation    Nutrition Interventions: Document food/fluid/supplement intake, Discuss nutritional consult with provider                     Problem: Patient Education: Go to Patient Education Activity  Goal: Patient/Family Education  Outcome: Progressing Towards Goal

## 2021-11-21 NOTE — PROGRESS NOTES
RENAL DAILY PROGRESS NOTE              Subjective:       Complaint:   Seen in rehab ,no complaints    IMPRESSION:   IMPRESSION:   · esrd mwf dialysis  · Pelvic fx,non displaced  · A fib  · Chronic hypotension,on midodrine  · Secondary hyperparathyroidism  · Anemia of chronic disease  · ? Headaches related to albumin infusion      PLAN:   C/w hectorol and retacrit  HD per schedule. D/c  albumin   Decrease allopurinol  Avoid Gadolinium due to its association with nephrogenic systemic fibrosis in a patients with severe ARF and ESRD.    Please dose all medications for creatinine clearance <15/dialysis.    Avoid blood pressure checks, blood draws, peripheral iv's on arm with access. AvoidPICC lines on either arm in order to preserve veins for dialysis access creation.               Current Facility-Administered Medications   Medication Dose Route Frequency    [START ON 11/22/2021] allopurinoL (ZYLOPRIM) tablet 100 mg  100 mg Oral Q MON, WED & FRI    [START ON 11/22/2021] epoetin caitlin-epbx (RETACRIT) injection 8,000 Units  8,000 Units SubCUTAneous Q MON, WED & FRI    cetirizine (ZYRTEC) tablet 10 mg  10 mg Oral DAILY    polyethylene glycol (MIRALAX) packet 17 g  17 g Oral DAILY    acetaminophen (TYLENOL) tablet 650 mg  650 mg Oral Q4H PRN    bisacodyL (DULCOLAX) tablet 10 mg  10 mg Oral Q48H PRN    amiodarone (CORDARONE) tablet 200 mg  200 mg Oral DAILY    cefpodoxime (VANTIN) tablet 200 mg  200 mg Oral Q24H    midodrine (PROAMATINE) tablet 10 mg  10 mg Oral TID WITH MEALS    acetaminophen (TYLENOL) tablet 650 mg  650 mg Oral TID    apixaban (ELIQUIS) tablet 5 mg  5 mg Oral BID    HYDROmorphone (DILAUDID) tablet 1 mg  1 mg Oral Q8H PRN    meclizine (ANTIVERT) tablet 12.5 mg  12.5 mg Oral TID PRN    DULoxetine (CYMBALTA) capsule 20 mg  20 mg Oral DAILY    vitamin B complex-folic acid 0.4 mg tablet  1 Tablet Oral DAILY    cyanocobalamin tablet 1,000 mcg  1,000 mcg Oral DAILY    L. acidophilus,casei,rhamnosus (BIO-K PLUS) capsule 1 Capsule  1 Capsule Oral DAILY    ascorbic acid (vitamin C) (VITAMIN C) tablet 500 mg  500 mg Oral DAILY    cholecalciferol (VITAMIN D3) (1000 Units /25 mcg) tablet 2,000 Units  2,000 Units Oral BID    latanoprost (XALATAN) 0.005 % ophthalmic solution 1 Drop  1 Drop Both Eyes QPM    levothyroxine (SYNTHROID) tablet 125 mcg  125 mcg Oral 6am    pantoprazole (PROTONIX) tablet 20 mg  20 mg Oral ACB    ondansetron (ZOFRAN ODT) tablet 4 mg  4 mg Oral TID PRN    lidocaine 4 % patch 1 Patch  1 Patch TransDERmal Q24H    gabapentin (NEURONTIN) capsule 200 mg  200 mg Oral Q MON, WED & FRI    [START ON 11/22/2021] doxercalciferoL (HECTOROL) 4 mcg/2 mL injection 4 mcg  4 mcg IntraVENous DIALYSIS MON, WED & FRI       Review of Symptoms: comprehensive ROS negative except above.    Objective:     Patient Vitals for the past 24 hrs:   Temp Pulse Resp BP SpO2   11/21/21 1224 -- 80 -- 124/65 --   11/21/21 0730 97.1 °F (36.2 °C) 80 12 108/62 95 %   11/20/21 2130 98 °F (36.7 °C) 81 16 (!) 98/44 98 %   11/20/21 1618 97.4 °F (36.3 °C) 71 12 (!) 137/53 98 %        Weight change:      11/19 1901 - 11/21 0700  In: 360 [P.O.:360]  Out: -     Intake/Output Summary (Last 24 hours) at 11/21/2021 1446  Last data filed at 11/21/2021 1230  Gross per 24 hour   Intake 540 ml   Output --   Net 540 ml     Physical Exam:   General: comfortable, no acute distress   HEENT sclera anicteric, supple neck, no thyromegaly  CVS: S1S2 heard,  no rub  RS: + air entry b/l,   Abd: Soft, Non tender, Not distended, Positive bowel sounds, no organomegaly, no CVA / supra pubic tenderness  Extrm: plus 1 edema, no cyanosis, clubbing   Skin: no visible  Rash  Musculoskeletal: No gross joints or bone deformities         Data Review:     LABS:   Hematology:   Recent Labs     11/19/21 0124   WBC 8.7   HGB 9.2*   HCT 29.9*     Chemistry:   Recent Labs     11/19/21 0124   BUN 41*   CREA 6.14*   CA 8.9   K 4.8   NA 138   CL 99*   CO2 32   *            Procedures/imaging: see electronic medical records for all procedures, Xrays and details which were not copied into this note but were reviewed prior to creation of Plan          Assessment & Plan:       See above      Darylene Santa, MD  11/21/2021

## 2021-11-21 NOTE — PROGRESS NOTES
Problem: Mobility Impaired (Adult and Pediatric)  Goal: *Therapy Goal (Edit Goal, Insert Text)  Description: Physical Therapy Short Term Goals  Initiated 11/20/2021 and to be accomplished within 7 day(s) on 11/27/2021  1. Patient will move from supine to sit and sit to supine , scoot up and down, and roll side to side in bed with supervision/set-up. 2.  Patient will transfer from bed to chair and chair to bed with moderate assistance  using the least restrictive device, consistently maintaining TTWB left LE. 3.  Patient will perform sit to stand with minimal to moderate assistance without additional assistance left LE to consistently maintain TTWB. 4.  Patient will perform w/c mobility SBA level over even surfaces for at least 200 ft before fatiguing. 5.  Patient will be able to perform static standing for at least 2 minute duration with 1-2 UE support before needing seated rest, maintaining TTWB left LE appropriately. Physical Therapy Long Term Goals  Initiated 11/20/2021 and to be accomplished within 28 day(s) 12/18/2021  1. Patient will move from supine to sit and sit to supine , scoot up and down, and roll side to side in bed with modified independence. 2.  Patient will transfer from bed to chair and chair to bed with modified independence using the least restrictive device. 3.  Patient will perform sit to stand with supervision/set-up. 4.  Patient will perform gait training if and when appropriate based on ability to consistently maintain TTWB left LE. 5.  Patient will perform w/c mobility mod I over even surfaces for at least 200 ft  6. Patient will negotiate w/c ramp with distant supervision.        Outcome: Progressing Towards Goal   PHYSICAL THERAPY TREATMENT    Patient: Bernard Guan [de-identified]64 y.o. female)  Date: 11/21/2021  Diagnosis: Multiple closed pelvic fractures without disruption of pelvic ring (HCC) [S32.82XA] Multiple closed pelvic fractures without disruption of pelvic ring [facility])  Precautions: Contact, TTWB, Fall, Skin  Chart, physical therapy assessment, plan of care and goals were reviewed. Time In:1330  Time Out:1430    Patient seen for: PM; Balance activities; Patient education; Transfer training; Wheelchair mobility    Pain:  Pt pain was reported as 8 pre-treatment. Pt pain was reported as 6  post-treatment. Intervention: Yes, pain medication    Patient identified with name and : Yes    SUBJECTIVE:      I'm sorry, but I feel like I should just quit dialysis and this would all be over. (Patient became very teary during beginning of session, she reported blaming herself for not doing her HEP when she had the opportunity and that she felt that that may be the partial cause of her current situation. Patient was educated at length regarding her current rehabilitation course and that each day would most likely become slightly easier each time she participated and became stronger. Patient acknowledged education and therapist asked patient if she would be interested in consultation by , which she agreed. Therapist relayed this information to the patient's assigned nurse, FRANCOISE who reported f/u.     OBJECTIVE DATA SUMMARY:    Objective:       BED/MAT MOBILITY Daily Assessment     Rolling Right : 4 (Minimal assistance)  Rolling Left : 4 (Minimal assistance)  Supine to Sit : 3 (Moderate assistance)  Sit to Supine : 1 (Total assistance)      TRANSFERS Daily Assessment     Transfer Type: SPT with walker  Transfer Assistance : 2 (Maximal assistance)  Sit to Stand Assistance: Maximum assistance  Car Transfers: Not tested Patient required maximum assistance from bed to [facility] 2/2 patient's fear of placing both hands on the  of the RW, therapist assured patient that she was assisting her and that she needed her to reach for the  for the safety of herself and that of the therapist. Patient was able to come to stand with c/o moderate pain, however she was able to shimmy step from the bed to the [facility] with moderate assistance and with maintenance of TTWBing left 100% of the time. Challenged patient with transfers from the [facility] to high/low mat table with moderate assistance, verbal cues to maintain TTWBing and a few shimmy steps to the mat table. GAIT Daily Assessment    Gait Description (WDL)      Gait Abnormalities Decreased step clearance    Assistive device Walker, rolling    Ambulation assistance - level surface 3 (Moderate assistance)    Distance  (3 steps laterally L/R)    Ambulation assistance- uneven surface  (NT)    Comments Patient required moderate assistance and verbal cues to use TTWBing LLE at all time with 3 steps to left and then right along edge of mat table. Patient appeared to become more confident with each standing activity at the  as the session progressed. STEPS/STAIRS Daily Assessment     Steps/Stairs ambulated  (NT)    Assistance Required 0 (Not tested)    Rail Use      asComments      Curbs/Ramps  (NT)        BALANCE Daily Assessment     Sitting - Static: Good (unsupported)  Sitting - Dynamic: Fair (occasional)  Standing - Static: Poor  Standing - Dynamic : Impaired        WHEELCHAIR MOBILITY Daily Assessment     Able to Propel (ft):  (175 ft)  Functional Level:  (5)  Curbs/Ramps Assist Required (FIM Score): 0 (Not tested)  Wheelchair Setup Assist Required : 2 (Maximal assistance)  Wheelchair Management: Manages left brake; Manages right brake        THERAPEUTIC EXERCISES Daily Assessment       Supine-LLE; AROM- AP, SAQs,AAROM- hip abduction/adduction x 10 reps each; 2 sets. ASSESSMENT:  Patient is seen for deficits in strength, transfers, balance and safety. Patient is agreeable to session after encouragement and education regarding healing. Patient will benefit from skilled rehabilitation to address current deficits for improved functional independence.   Progression toward goals:  []      Improving appropriately and progressing toward goals  [x]      Improving slowly and progressing toward goals  []      Not making progress toward goals and plan of care will be adjusted      PLAN:  Patient continues to benefit from skilled intervention to address the above impairments. Continue treatment per established plan of care. Discharge Recommendations:  HHPT  Further Equipment Recommendations for Discharge:  WC with elevated leg rests, ramp for entry/exiting home, RW-currently, but pending progress      Estimated Discharge Date: 4 weeks    Activity Tolerance: Tolerance improved as the session progressed this PM.   Please refer to the flowsheet for vital signs taken during this treatment.     After treatment:   [] Patient left in no apparent distress in bed  [x] Patient left in no apparent distress sitting up in chair  [] Patient left in no apparent distress in w/c mobilizing under own power  [] Patient left in no apparent distress dining area  [] Patient left in no apparent distress mobilizing under own power  [] Call bell left within reach  [] Nursing notified  [] Caregiver present  [] Bed alarm activated   [x] Chair alarm activated      Rio Ramirez  11/21/2021

## 2021-11-21 NOTE — PROGRESS NOTES
Progress Note    Patient: Fozia Ro MRN: [id number]  CSN: [id number]    YOB: 1945  Age: 64 y.o. Sex: female    DOA: 11/19/2021 LOS:  LOS: 2 days                    Subjective:     Rehab diagnosis:  Impaired mobility and ADLs due to multiple closed pelvic fractures (comminuted fracture involving the L anterior acetabular wall w/involvement of the base of the left superior pubic ramus w/o significant displacement). Pt reporting had episode of approx 15min afib with rvr she could feel. Spontaneously resolved. No further headache, itchy eyes or runny nose. Had normal BM  No acute patient or nursing concerns today. Review of systems  General: No fevers or chills. Cardiovascular: No chest pain or pressure. No palpitations. Pulmonary: No shortness of breath, cough or wheeze. Gastrointestinal: No abdominal pain, nausea, vomiting or diarrhea.  +constipation - resolving  Genitourinary: +UTI on antibiotic  Musculoskeletal: joint pain fair control on medication  Neurologic: pelvic pain/hip pain, weakness due to this    Objective:     Physical Exam:  Visit Vitals  /65   Pulse 80   Temp 97.1 °F (36.2 °C)   Resp 12   Ht 5' 2\" (1.575 m)   SpO2 95%   BMI 38.24 kg/m²        General:         Alert, cooperative, no acute distress    HEENT: NC, Atraumatic. PERRLA, anicteric sclerae. Lungs: CTA Bilaterally. No Wheezing/Rhonchi/Rales. Heart:  Irregularly irregular rhythm,  II/VI systolic murmur  Abdomen: Soft, Non distended, Non tender.  +Bowel sounds  Extremities: No edema  Psych:   Not anxious or agitated. Neurologic:  CN 2-12 grossly intact, Alert and oriented X 3. No acute neurological  Deficits.   LE weakness related to pain from fractures    Intake and Output:  Current Shift:  11/21 0701 - 11/21 1900  In: 420 [P.O.:420]  Out: -   Last three shifts:  11/19 1901 - 11/21 0700  In: 360 [P.O.:360]  Out: -     Labs: Results:       Chemistry Recent Labs     11/19/21  0124   *    K 4.8   CL 99*   CO2 32   BUN 41*   CREA 6.14*   CA 8.9   AGAP 7   BUCR 7*      CBC w/Diff Recent Labs     11/19/21  0124   WBC 8.7   RBC 3.01*   HGB 9.2*   HCT 29.9*      GRANS 54   LYMPH 31   EOS 2      Cardiac Enzymes No results for input(s): CPK, CKND1, PATTI in the last 72 hours. No lab exists for component: CKRMB, TROIP   Coagulation No results for input(s): PTP, INR, APTT, INREXT, INREXT in the last 72 hours. Lipid Panel No results found for: CHOL, CHOLPOCT, CHOLX, CHLST, CHOLV, 613630, HDL, HDLP, LDL, LDLC, DLDLP, 466530, VLDLC, VLDL, TGLX, TRIGL, TRIGP, TGLPOCT, CHHD, CHHDX   BNP No results for input(s): BNPP in the last 72 hours. Liver Enzymes No results for input(s): TP, ALB, TBIL, AP in the last 72 hours.     No lab exists for component: SGOT, GPT, DBIL   Thyroid Studies Lab Results   Component Value Date/Time    TSH 11.40 (H) 11/13/2021 04:45 AM          Procedures/imaging: see electronic medical records for all procedures/Xrays and details which were not copied into this note but were reviewed prior to creation of Plan    Medications:   Current Facility-Administered Medications   Medication Dose Route Frequency    [START ON 11/22/2021] allopurinoL (ZYLOPRIM) tablet 100 mg  100 mg Oral Q MON, WED & FRI    [START ON 11/22/2021] epoetin caitlin-epbx (RETACRIT) injection 8,000 Units  8,000 Units SubCUTAneous Q MON, WED & FRI    cetirizine (ZYRTEC) tablet 10 mg  10 mg Oral DAILY    polyethylene glycol (MIRALAX) packet 17 g  17 g Oral DAILY    acetaminophen (TYLENOL) tablet 650 mg  650 mg Oral Q4H PRN    bisacodyL (DULCOLAX) tablet 10 mg  10 mg Oral Q48H PRN    amiodarone (CORDARONE) tablet 200 mg  200 mg Oral DAILY    cefpodoxime (VANTIN) tablet 200 mg  200 mg Oral Q24H    midodrine (PROAMATINE) tablet 10 mg  10 mg Oral TID WITH MEALS    acetaminophen (TYLENOL) tablet 650 mg  650 mg Oral TID    apixaban (ELIQUIS) tablet 5 mg  5 mg Oral BID    HYDROmorphone (DILAUDID) tablet 1 mg  1 mg Oral Q8H PRN    meclizine (ANTIVERT) tablet 12.5 mg  12.5 mg Oral TID PRN    DULoxetine (CYMBALTA) capsule 20 mg  20 mg Oral DAILY    vitamin B complex-folic acid 0.4 mg tablet  1 Tablet Oral DAILY    cyanocobalamin tablet 1,000 mcg  1,000 mcg Oral DAILY    L. acidophilus,casei,rhamnosus (BIO-K PLUS) capsule 1 Capsule  1 Capsule Oral DAILY    ascorbic acid (vitamin C) (VITAMIN C) tablet 500 mg  500 mg Oral DAILY    cholecalciferol (VITAMIN D3) (1000 Units /25 mcg) tablet 2,000 Units  2,000 Units Oral BID    latanoprost (XALATAN) 0.005 % ophthalmic solution 1 Drop  1 Drop Both Eyes QPM    levothyroxine (SYNTHROID) tablet 125 mcg  125 mcg Oral 6am    pantoprazole (PROTONIX) tablet 20 mg  20 mg Oral ACB    ondansetron (ZOFRAN ODT) tablet 4 mg  4 mg Oral TID PRN    lidocaine 4 % patch 1 Patch  1 Patch TransDERmal Q24H    gabapentin (NEURONTIN) capsule 200 mg  200 mg Oral Q MON, WED & FRI    [START ON 11/22/2021] doxercalciferoL (HECTOROL) 4 mcg/2 mL injection 4 mcg  4 mcg IntraVENous DIALYSIS MON, WED & FRI       Assessment/Plan     Principal Problem:    Multiple closed pelvic fractures without disruption of pelvic ring (HCC) (11/19/2021)    Active Problems:    Complicated urinary tract infection (2/20/2020)      Hypotension (5/15/2020)      Type 2 diabetes mellitus, without long-term current use of insulin ([address] (5/17/2020)      Acquired hypothyroidism (5/17/2020)      Atrial fibrillation ([address] (11/12/2021)      Depression (11/21/2021)      Impaired mobility and ADLs due to multiple closed pelvic fractures (comminuted fracture involving the L anterior acetabular wall w/involvement of the base of the left superior pubic ramus w/o significant displacement).   Orthopedic surgery recommending non-surgical management with PT, pain control     UTI, history of R ureteral stent  Started on IV ceftriaxone and transitioned to cefpodoxime, last dose 11/25/2021  Follow up with urology on discharge from ARU     Paroxysmal Afib  Continue eliquis, amiodarone  Monitor HR and signs of bleeding  Pt will let us know if feeling palpitations or heart racing     Chronic back pain  Continue 200mg gabapentin after HD, continue dilaudid 1mg q8 prn     Hypothyroid  Continue levothyroxine 125mcg daily  11/13/21 TSH 11.4, recheck with am labs     Chronic hypotension  Continue midodrine 10mg tid, monitor bp     ESRD on HD MWF  Continue HD per nephrology.      DM2  A1c 10/8/21 4.6  Diet controlled     Depression  Continue cymbalta 20mg daily     ? Allergy to albumin  Will list as allergy per pt and daughter-in-law request.  Start antihistamine.   Monitor.         Aman Villa MD  11/21/2021

## 2021-11-21 NOTE — PROGRESS NOTES
[id number] Physical therapist notified nurse patient had made statement while in therapy that she would like to end dialysis so it would be all over. The same statement was made yesterday 11/20/21 to Physical therapist.    1600 Nurse consulted with patient and asked if she had any thoughts of harming herself or ending her life, she stated no, She stated ( Edison just tired of being sick and in the hospital). She stated she lived with her son here in [place] and her home in [facility] was being sold. Patient was asked did she want to consult  with a  and she said yes.  notified and will speak with patient tomorrow upon patient request.Patient did not want to see  today because family was on the way to visit. Will continue to monitor closely.

## 2021-11-21 NOTE — PROGRESS NOTES
Problem: Self Care Deficits Care Plan (Adult)  Goal: *Therapy Goal (Edit Goal, Insert Text)  Description: Long Term Goals (to be met upon discharge date) in order to increase pt's functional independence and safety, and decrease burden of care:  1. Pt will perform self-feeding with Mod Independent  2. Pt will perform grooming with Mod Independent  3. Pt will perform UB bathing with supervision  4. Pt will perform LB bathing with SBA  5. Pt will perform shower transfer with SBA  6. Pt will perform UB dressing with supervision  7. Pt will perform LB dressing with SBA  8. Pt will perform toileting task with SBA  9. Pt will perform toilet transfer with SBA  10. Pt will perform an IADL task while standing with SBA     Short Term Weekly Goals for (2021-2021) in order to increase pt's functional independence and safety, and decrease burden of care:  1. Pt will perform self-feeding with supervision  2. Pt will perform grooming with supervision  3. Pt will perform UB bathing with SBA  4. Pt will perform LB bathing with Min A  5. Pt will perform shower transfer with Min A  6. Pt will perform UB dressing with SBA  7. Pt will perform LB dressing with Min A  8. Pt will perform toileting task with Min A  9. Pt will perform toilet transfer with Min A  Occupational Therapy TREATMENT    Patient: Shaan Hill   64 y.o. Patient identified with name and : Yes     Date: 2021    First Tx Session  Time In: 0900  Time Out[de-identified] 1000        Diagnosis: Multiple closed pelvic fractures without disruption of pelvic ring (HCC) [S32.82XA]   Precautions: Contact, TTWB, Fall, Skin  Chart, occupational therapy assessment, plan of care, and goals were reviewed. Pain:  Pt reports 8/10 pain or discomfort prior to treatment at pelvis radiating down left knee    Pt reports 8/10 pain or discomfort post treatment.    Intervention Provided: N/A      SUBJECTIVE:   Patient stated I will do my best.    OBJECTIVE DATA SUMMARY: THERAPEUTIC EXERCISE Daily Assessment    To increase strength and endurance for ADLs:   Pt propelled w/c from room <>gym with S. UB Bike up to 10 minutes. PER 8/10    Perfection: Pt completed perfection task with game bord placed on and incline and 2-1/5 lb weights placed on wrist     Pt showed decreased strength at BUE and ROM at RUE    Second Session: Chair raises 3x5   Instruct pt. in home exercise program: Initiated UB HEP Bicep curls and chest pulls 3x10 with 2lb weight in both hands,  Shoulder circles backward/forward at LUE. Pt required vcs to relax shoulders. [facility] [place] PROM but decreased AROM reporting surgery 25 years ago. Pt propelled w/c from gym to room with S.        MOBILITY/TRANSFERS Daily Assessment     Sit to stand x3 attempts. Pt unable to get into stand position stopping midway reporting shooting 10/10 pain from pelvis/back of leg       ASSESSMENT:  Pt working on increasing strength and standing balance while following LLE weight restrictions. Progression toward goals:  []          Improving appropriately and progressing toward goals  [x]          Improving slowly and progressing toward goals  []          Not making progress toward goals and plan of care will be adjusted     PLAN:  Patient continues to benefit from skilled intervention to address the above impairments. Continue treatment per established plan of care. Discharge Recommendations:  Home Health with 24 asst  Further Equipment Recommendations for Discharge:  TBD     Activity Tolerance:  Fair       Estimated LOS:    Please refer to the flowsheet for vital signs taken during this treatment.   After treatment:   [x]  Patient left in no apparent distress sitting up in chair   []  Patient left in no apparent distress in bed  []  Call bell left within reach  []  Nursing notified  []  Caregiver present  []  Bed alarm activated    COMMUNICATION/EDUCATION:   [] Home safety education was provided and the patient/caregiver indicated understanding. [x] Patient/family have participated as able in goal setting and plan of care. [] Patient/family agree to work toward stated goals and plan of care. [] Patient understands intent and goals of therapy, but is neutral about his/her participation. [] Patient is unable to participate in goal setting and plan of care.       Tahmina Fraser OT   11/21/2021 0919 by Sonal Martinez OT  Outcome: Progressing Towards Goal  11/21/2021 0915 by Sonal Martinez OT  Outcome: Progressing Towards Goal  Goal: *Therapy Goal (Edit Goal, Insert Text)  Outcome: Progressing Towards Goal  Goal: *Therapy Goal (Edit Goal, Insert Text)  Outcome: Progressing Towards Goal  Goal: *Therapy Goal (Edit Goal, Insert Text)  Outcome: Progressing Towards Goal  Goal: *Therapy Goal (Edit Goal, Insert Text)  Outcome: Progressing Towards Goal

## 2021-11-21 NOTE — PROGRESS NOTES
SHIFT CHANGE NOTE FOR [facility]    Bedside and Verbal shift change report given to [address] [place] [address] Avenue (oncoming nurse) by Natalia Crum LPN (offgoing nurse). Report included the following information SBAR, Kardex, MAR and Recent Results. Situation:   Code Status: DNR   Hospital Day: 1   Problem List:   Hospital Problems  Date Reviewed: 9/21/2021          Codes Class Noted POA    Multiple closed pelvic fractures without disruption of pelvic ring ([facility] [address] ICD-10-CM: S74.89EH  ICD-9-CM: 808.44  11/19/2021 Unknown              Background:   Past Medical History:   Past Medical History:   Diagnosis Date    Acidosis     Anemia     Arteriovenous fistula (HCC)     Chronic kidney disease     on HD at [facility] on [address] on MWF. [phone]    Chronic pain     CKD (chronic kidney disease)     Diabetes ([address]     no meds now    ESRD (end stage renal disease) on dialysis ([address] 9/9/2021    HLD (hyperlipidemia)     HTN (hypertension)     Hyperparathyroidism due to renal insufficiency ([address]     Hypothyroid     Kidney stone     Lung mass     Recurrent UTI     Ureter, stricture     Uric acid nephrolithiasis     Urinary incontinence         Assessment:   Changes in Assessment throughout shift: No change to previous assessment     Patient has a central line: no Reasons if yes:    Insertion date: Last dressing date:   Patient has Cline Cath: no Reasons if yes:     Insertion date:  Last Vitals:     Vitals:    11/20/21 0746 11/20/21 1210 11/20/21 1328 11/20/21 1618   BP: (!) 106/95 132/71  (!) 137/53   Pulse: 75 65  71   Resp: 14   12   Temp: 96.9 °F (36.1 °C)   97.4 °F (36.3 °C)   SpO2: 98%   98%   Height:   5' 2\" (1.575 m)         PAIN    Pain Assessment    Pain Intensity 1: 0 (11/20/21 2010) Pain Intensity 1: 2 (12/29/14 1105)    Pain Location 1: Hip Pain Location 1: Abdomen    Pain Intervention(s) 1: Medication (see MAR) Pain Intervention(s) 1: Medication (see MAR)  Patient Stated Pain Goal: 0 Patient Stated Pain Goal: 0  o Intervention effective: yes  o Other actions taken for pain:       Skin Assessment  Skin color Skin Color: Appropriate for ethnicity  Condition/Temperature Skin Condition/Temp: Dry, Warm  Integrity Skin Integrity: Intact  Turgor Turgor: Non-tenting  Weekly Pressure Ulcer Documentation  Pressure  Injury Documentation: No Pressure Injury Noted-Pressure Ulcer Prevention Initiated  Wound Prevention & Protection Methods  Orientation of wound Orientation of Wound Prevention: Posterior  Location of Prevention Location of Wound Prevention: Buttocks, Sacrum/Coccyx  Dressing Present Dressing Present : No  Dressing Status    Wound Offloading Wound Offloading (Prevention Methods): Bed, pressure redistribution/air, Chair cushion, Pillows, Repositioning     INTAKE/OUPUT  Date 11/19/21 1900 - 11/20/21 0659 11/20/21 0700 - 11/21/21 0659   Shift 8934-1553 24 Hour Total 5291-9265 3497-8619 24 Hour Total   INTAKE   P.O.  120 240 120 360     P. O.  120 240 120 360   Shift Total  120 240 120 360   OUTPUT   Urine          Urine Occurrence(s) 0 x 1 x      Emesis/NG output          Emesis Occurrence(s) 0 x 0 x      Stool          Stool Occurrence(s) 0 x 1 x      Shift Total        NET  120 240 120 360   Weight (kg)            Recommendations:  1. Patient needs and requests: TOILETING    2. Pending tests/procedures: LABS PENDING     3. Functional Level/Equipment: Partial (one person) /      Fall Precautions:   Fall risk precautions were reinforced with the patient; she was instructed to call for help prior to getting up. The following fall risk precautions were continued: bed/ chair alarms, door signage, yellow bracelet and socks as well as update of the Yanni Pacheco tool in the patient's room. Freddy Score: 4    HEALS Safety Check    A safety check occurred in the patient's room between off going nurse and oncoming nurse listed above.     The safety check included the below items  Area Items   H  High Alert Medications - Verify all high alert medication drips (heparin, PCA, etc.)   E  Equipment - Suction is set up for ALL patients (with adelaide)  - Red plugs utilized for all equipment (IV pumps, etc.)  - WOWs wiped down at end of shift.  - Room stocked with oxygen, suction, and other unit-specific supplies   A  Alarms - Bed alarm is set for fall risk patients  - Ensure chair alarm is in place and activated if patient is up in a chair   L  Lines - Check IV for any infiltration  - Cline bag is empty if patient has a Cline   - Tubing and IV bags are labeled   S  Safety   - Room is clean, patient is clean, and equipment is clean. - Hallways are clear from equipment besides carts. - Fall bracelet on for fall risk patients  - Ensure room is clear and free of clutter  - Suction is set up for ALL patients (with adelaide)  - Hallways are clear from equipment besides carts.    - Isolation precautions followed, supplies available outside room, sign posted     Lalita Chávez LPN

## 2021-11-21 NOTE — PROGRESS NOTES
SHIFT CHANGE NOTE FOR [facility]    Bedside and Verbal shift change report given to JC BHATT (oncoming nurse) by Gallo Sesay LPN (offgoing nurse). Report included the following information SBAR, Kardex, MAR and Recent Results. Situation:   Code Status: DNR   Hospital Day: 2   Problem List:   Hospital Problems  Date Reviewed: 9/21/2021          Codes Class Noted POA    Depression ICD-10-CM: F32. A  ICD-9-CM: 978  11/21/2021 Unknown        * (Principal) Multiple closed pelvic fractures without disruption of pelvic ring ([facility] [address] ICD-10-CM: B72.39OM  ICD-9-CM: 808.44  11/19/2021 Unknown        Atrial fibrillation (HCC) ICD-10-CM: I48.91  ICD-9-CM: 427.31  11/12/2021 Yes        Type 2 diabetes mellitus, without long-term current use of insulin (HCC) ICD-10-CM: E11.9  ICD-9-CM: 250.00  5/17/2020 Yes        Acquired hypothyroidism ICD-10-CM: E03.9  ICD-9-CM: 244.9  5/17/2020 Yes        Hypotension ICD-10-CM: I95.9  ICD-9-CM: 458.9  5/06/4963 Yes        Complicated urinary tract infection ICD-10-CM: N39.0  ICD-9-CM: 599.0  2/20/2020 Yes              Background:   Past Medical History:   Past Medical History:   Diagnosis Date    Acidosis     Anemia     Arteriovenous fistula (HCC)     [facility]     [facility] [phone]    Chronic pain     CKD (chronic kidney disease)     Diabetes ([facility] [address]     no meds now    ESRD (end stage renal disease) on dialysis ([facility] [address] 9/9/2021    HLD (hyperlipidemia)     HTN (hypertension)     Hyperparathyroidism due to renal insufficiency ([facility] [address]     Hypothyroid     Kidney stone     Lung mass     Recurrent UTI     Ureter, stricture     Uric acid nephrolithiasis     Urinary incontinence         Assessment:   Changes in Assessment throughout shift: No change to previous assessment     Patient has a central line: no Reasons if yes: Insertion date: Last dressing date:   Patient has Cline Cath: no Reasons if yes:     Insertion date:    Last Vitals:     Vitals:    11/20/21 1618 11/20/21 2130 11/21/21 0730 11/21/21 1224   BP: (!) 137/53 (!) 98/44 108/62 124/65   Pulse: 71 81 80 80   Resp: 12 16 12    Temp: 97.4 °F (36.3 °C) 98 °F (36.7 °C) 97.1 °F (36.2 °C)    SpO2: 98% 98% 95%    Height:            PAIN    Pain Assessment    Pain Intensity 1: 0 (11/21/21 1200) Pain Intensity 1: 2 (12/29/14 1105)    Pain Location 1: Hip, Leg Pain Location 1: Abdomen    Pain Intervention(s) 1: Medication (see MAR) Pain Intervention(s) 1: Medication (see MAR)  Patient Stated Pain Goal: 0 Patient Stated Pain Goal: 0  o Intervention effective: yes  o Other actions taken for pain: Medication (see MAR)     Skin Assessment  Skin color Skin Color: Appropriate for ethnicity  Condition/Temperature Skin Condition/Temp: Dry, Warm  Integrity Skin Integrity: Intact  Turgor Turgor: Non-tenting  Weekly Pressure Ulcer Documentation  Pressure  Injury Documentation: No Pressure Injury Noted-Pressure Ulcer Prevention Initiated  Wound Prevention & Protection Methods  Orientation of wound Orientation of Wound Prevention: Posterior  Location of Prevention Location of Wound Prevention: Buttocks, Sacrum/Coccyx  Dressing Present Dressing Present : No  Dressing Status    Wound Offloading Wound Offloading (Prevention Methods): Bed, pressure redistribution/air, Chair cushion, Pillows, Repositioning     INTAKE/OUPUT  Date 11/20/21 0700 - 11/21/21 0659 11/21/21 0700 - 11/22/21 0659   Shift 1812-5249 1450-9110 24 Hour Total 9482-0435 6625-2755 24 Hour Total   INTAKE   P.O. 240 120 360 420  420     P. O. 240 120 360 420  420   Shift Total 240 120 360 420  420   OUTPUT   Urine           Urine Occurrence(s)  0 x 0 x 0 x  0 x   Stool           Stool Occurrence(s)  0 x 0 x 0 x  0 x   Shift Total          120 360 420  420   Weight (kg)             Recommendations:  1. Patient needs and requests: TOILETING    2. Pending tests/procedures: LABS PENDING     3.  Functional Level/Equipment: Partial (one person) /      Fall Precautions:   Fall risk precautions were reinforced with the patient; she was instructed to call for help prior to getting up. The following fall risk precautions were continued: bed/ chair alarms, door signage, yellow bracelet and socks as well as update of the Sunshine Borges tool in the patient's room. Freddy Score: 4    HEALS Safety Check    A safety check occurred in the patient's room between off going nurse and oncoming nurse listed above. The safety check included the below items  Area Items   H  High Alert Medications - Verify all high alert medication drips (heparin, PCA, etc.)   E  Equipment - Suction is set up for ALL patients (with adelaide)  - Red plugs utilized for all equipment (IV pumps, etc.)  - WOWs wiped down at end of shift.  - Room stocked with oxygen, suction, and other unit-specific supplies   A  Alarms - Bed alarm is set for fall risk patients  - Ensure chair alarm is in place and activated if patient is up in a chair   L  Lines - Check IV for any infiltration  - Cline bag is empty if patient has a Cline   - Tubing and IV bags are labeled   S  Safety   - Room is clean, patient is clean, and equipment is clean. - Hallways are clear from equipment besides carts. - Fall bracelet on for fall risk patients  - Ensure room is clear and free of clutter  - Suction is set up for ALL patients (with adelaide)  - Hallways are clear from equipment besides carts.    - Isolation precautions followed, supplies available outside room, sign posted     Kellen Gerardo LPN

## 2021-11-22 LAB
ALBUMIN SERPL-MCNC: 3.6 G/DL (ref 3.4–5)
ANION GAP SERPL CALC-SCNC: 9 MMOL/L (ref 3–18)
BASOPHILS # BLD: 0.1 K/UL (ref 0–0.1)
BASOPHILS NFR BLD: 1 % (ref 0–2)
BUN SERPL-MCNC: 58 MG/DL (ref 7–18)
BUN/CREAT SERPL: 8 (ref 12–20)
CALCIUM SERPL-MCNC: 9.1 MG/DL (ref 8.5–10.1)
CALCIUM SERPL-MCNC: 9.4 MG/DL (ref 8.5–10.1)
CHLORIDE SERPL-SCNC: 101 MMOL/L (ref 100–111)
CO2 SERPL-SCNC: 27 MMOL/L (ref 21–32)
CREAT SERPL-MCNC: 7.52 MG/DL (ref 0.6–1.3)
DIFFERENTIAL METHOD BLD: ABNORMAL
EOSINOPHIL # BLD: 0.3 K/UL (ref 0–0.4)
EOSINOPHIL NFR BLD: 3 % (ref 0–5)
ERYTHROCYTE [DISTWIDTH] IN BLOOD BY AUTOMATED COUNT: 15.8 % (ref 11.6–14.5)
GLUCOSE SERPL-MCNC: 106 MG/DL (ref 74–99)
HCT VFR BLD AUTO: 30.5 % (ref 35–45)
HGB BLD-MCNC: 9.4 G/DL (ref 12–16)
IMM GRANULOCYTES # BLD AUTO: 0.3 K/UL (ref 0–0.04)
IMM GRANULOCYTES NFR BLD AUTO: 3 % (ref 0–0.5)
LYMPHOCYTES # BLD: 3.5 K/UL (ref 0.9–3.6)
LYMPHOCYTES NFR BLD: 34 % (ref 21–52)
MAGNESIUM SERPL-MCNC: 2.4 MG/DL (ref 1.6–2.6)
MCH RBC QN AUTO: 31.1 PG (ref 24–34)
MCHC RBC AUTO-ENTMCNC: 30.8 G/DL (ref 31–37)
MCV RBC AUTO: 101 FL (ref 78–100)
MONOCYTES # BLD: 0.9 K/UL (ref 0.05–1.2)
MONOCYTES NFR BLD: 9 % (ref 3–10)
NEUTS SEG # BLD: 5.2 K/UL (ref 1.8–8)
NEUTS SEG NFR BLD: 51 % (ref 40–73)
NRBC # BLD: 0 K/UL (ref 0–0.01)
NRBC BLD-RTO: 0 PER 100 WBC
PHOSPHATE SERPL-MCNC: 8.1 MG/DL (ref 2.5–4.9)
PLATELET # BLD AUTO: 472 K/UL (ref 135–420)
PMV BLD AUTO: 9.7 FL (ref 9.2–11.8)
POTASSIUM SERPL-SCNC: 5.4 MMOL/L (ref 3.5–5.5)
PTH-INTACT SERPL-MCNC: 374.7 PG/ML (ref 18.4–88)
RBC # BLD AUTO: 3.02 M/UL (ref 4.2–5.3)
SODIUM SERPL-SCNC: 137 MMOL/L (ref 136–145)
T4 FREE SERPL-MCNC: 1.1 NG/DL (ref 0.7–1.5)
TSH SERPL DL<=0.05 MIU/L-ACNC: 1.69 UIU/ML (ref 0.36–3.74)
WBC # BLD AUTO: 10.1 K/UL (ref 4.6–13.2)

## 2021-11-22 PROCEDURE — 84439 ASSAY OF FREE THYROXINE: CPT

## 2021-11-22 PROCEDURE — 83735 ASSAY OF MAGNESIUM: CPT

## 2021-11-22 PROCEDURE — 83970 ASSAY OF PARATHORMONE: CPT

## 2021-11-22 PROCEDURE — 74011250637 HC RX REV CODE- 250/637: Performed by: FAMILY MEDICINE

## 2021-11-22 PROCEDURE — 97535 SELF CARE MNGMENT TRAINING: CPT

## 2021-11-22 PROCEDURE — 65310000000 HC RM PRIVATE REHAB

## 2021-11-22 PROCEDURE — 84443 ASSAY THYROID STIM HORMONE: CPT

## 2021-11-22 PROCEDURE — 74011000250 HC RX REV CODE- 250: Performed by: INTERNAL MEDICINE

## 2021-11-22 PROCEDURE — 74011250637 HC RX REV CODE- 250/637: Performed by: INTERNAL MEDICINE

## 2021-11-22 PROCEDURE — 99232 SBSQ HOSP IP/OBS MODERATE 35: CPT | Performed by: INTERNAL MEDICINE

## 2021-11-22 PROCEDURE — 36415 COLL VENOUS BLD VENIPUNCTURE: CPT

## 2021-11-22 PROCEDURE — 90935 HEMODIALYSIS ONE EVALUATION: CPT

## 2021-11-22 PROCEDURE — 97530 THERAPEUTIC ACTIVITIES: CPT

## 2021-11-22 PROCEDURE — 97110 THERAPEUTIC EXERCISES: CPT

## 2021-11-22 PROCEDURE — 74011250636 HC RX REV CODE- 250/636: Performed by: INTERNAL MEDICINE

## 2021-11-22 PROCEDURE — 85025 COMPLETE CBC W/AUTO DIFF WBC: CPT

## 2021-11-22 PROCEDURE — 80069 RENAL FUNCTION PANEL: CPT

## 2021-11-22 RX ADMIN — PANTOPRAZOLE SODIUM 20 MG: 20 TABLET, DELAYED RELEASE ORAL at 07:30

## 2021-11-22 RX ADMIN — Medication 1 TABLET: at 09:04

## 2021-11-22 RX ADMIN — HYDROMORPHONE HYDROCHLORIDE 1 MG: 2 TABLET ORAL at 21:38

## 2021-11-22 RX ADMIN — Medication 500 MG: at 09:03

## 2021-11-22 RX ADMIN — LATANOPROST 1 DROP: 50 SOLUTION OPHTHALMIC at 21:20

## 2021-11-22 RX ADMIN — MIDODRINE HYDROCHLORIDE 10 MG: 5 TABLET ORAL at 09:04

## 2021-11-22 RX ADMIN — DOXERCALCIFEROL 4 MCG: 4 INJECTION, SOLUTION INTRAVENOUS at 16:15

## 2021-11-22 RX ADMIN — CHOLECALCIFEROL TAB 25 MCG (1000 UNIT) 2000 UNITS: 25 TAB at 09:03

## 2021-11-22 RX ADMIN — CHOLECALCIFEROL TAB 25 MCG (1000 UNIT) 2000 UNITS: 25 TAB at 18:24

## 2021-11-22 RX ADMIN — APIXABAN 5 MG: 5 TABLET, FILM COATED ORAL at 09:03

## 2021-11-22 RX ADMIN — CEFPODOXIME PROXETIL 200 MG: 200 TABLET, FILM COATED ORAL at 18:23

## 2021-11-22 RX ADMIN — APIXABAN 5 MG: 5 TABLET, FILM COATED ORAL at 18:24

## 2021-11-22 RX ADMIN — MIDODRINE HYDROCHLORIDE 10 MG: 5 TABLET ORAL at 12:31

## 2021-11-22 RX ADMIN — LEVOTHYROXINE SODIUM 125 MCG: 125 TABLET ORAL at 05:48

## 2021-11-22 RX ADMIN — AMIODARONE HYDROCHLORIDE 200 MG: 200 TABLET ORAL at 09:04

## 2021-11-22 RX ADMIN — PANTOPRAZOLE SODIUM 20 MG: 20 TABLET, DELAYED RELEASE ORAL at 05:48

## 2021-11-22 RX ADMIN — CYANOCOBALAMIN TAB 1000 MCG 1000 MCG: 1000 TAB at 09:04

## 2021-11-22 RX ADMIN — GABAPENTIN 200 MG: 100 CAPSULE ORAL at 18:23

## 2021-11-22 RX ADMIN — Medication 1 CAPSULE: at 09:03

## 2021-11-22 RX ADMIN — CETIRIZINE HYDROCHLORIDE 10 MG: 10 TABLET, FILM COATED ORAL at 09:03

## 2021-11-22 RX ADMIN — EPOETIN ALFA-EPBX 8000 UNITS: 4000 INJECTION, SOLUTION INTRAVENOUS; SUBCUTANEOUS at 21:20

## 2021-11-22 RX ADMIN — ACETAMINOPHEN 650 MG: 325 TABLET ORAL at 18:24

## 2021-11-22 RX ADMIN — ALLOPURINOL 100 MG: 100 TABLET ORAL at 21:20

## 2021-11-22 RX ADMIN — ACETAMINOPHEN 650 MG: 325 TABLET ORAL at 12:31

## 2021-11-22 RX ADMIN — DULOXETINE HYDROCHLORIDE 20 MG: 20 CAPSULE, DELAYED RELEASE ORAL at 09:04

## 2021-11-22 RX ADMIN — ACETAMINOPHEN 650 MG: 325 TABLET ORAL at 07:21

## 2021-11-22 NOTE — DIALYSIS
ACUTE HEMODIALYSIS FLOW SHEET    HEMODIALYSIS ORDERS: Physician: Dr. Miguel Chapa: [place]   Duration: 3 hr  BFR: 350   DFR: 600   Dialysate:  Temp 36   K+   2    Ca+  2.5   Na 138   Bicarb 30   Wt Readings from Last 1 Encounters:   11/19/21 94.8 kg (209 lb 1.6 oz)    Patient Chart [x]   Unable to Obtain []  Dry weight/UF Goal: 2000 ml   Heparin []  Bolus    Units    [] Hourly    Units    [x]None       Pre BP:   115/60    Pulse:  64   Respirations: 18    Temperature:  97.7  [] Oral [] Axillary  Tx: NSS   ml/Bolus   [x] N/A   Labs: []  Pre  []  Post:   [x] N/A   Additional Orders(medications, blood products, hypotension management): [x] Yes   [] No     [x]  [facility] Consent Verified     GRAFT/FISTULA ACCESS:   []N/A     []Right     [x]Left     [x]UE     []LE   []AVG   [x]AVF     []Buttonhole    [x]Medical Aseptic Prep Utilized   [x]No S/S infection  []Redness  []Drainage []Cultured  [] Swelling  [] Pain  Bruit:   [x] Strong    [] Weak       Thrill :   [] Strong    [] Weak     Needle Gauge: 15   Length: 1 inch   If access problem,  notified: []Yes     [x]N/A     Please describe access if present and not used: N/A       GENERAL ASSESSMENT:    LUNGS:   SaO2%      [x] Clear  [] Coarse  [] Crackles  [] Wheezing                                                [] Diminished     Location : []RLL   []LLL    []RUL  []LALI   Cough: []Productive  []Dry  [x]N/A   Respirations:  [x]Easy  []Labored   Therapy:  [x]RA  []NC l/min    Mask: []NRB  [] Venti    O2%                  []Ventilator  []Intubated  [] Trach  [] BiPaP   CARDIAC: [x]Regular      [] Irregular   [] Pericardial Rub  [] JVD          []  Monitored  [] Bedside  [] Remotely monitored     EDEMA: [x] None  []Generalized  [] Pitting [] 1    [] 2    [] 3    [] 4                 [] Facial  [] Pedal  []  UE  [] LE   SKIN:   [x] Warm  [] Hot     [] Cold   [x] Dry     [] Pale   [] Diaphoretic                  [] Flushed  [] Jaundiced  [] Cyanotic  [] Rash  [] Weeping   LOC:    [x] Alert      [x]Oriented:    [x] Person     [x] Place  [x]Time               [] Confused  [] Lethargic  [] Medicated  [] Non-responsive  [] Non-Verbal   GI / ABDOMEN:                     [] Flat    [] Distended    [x] Soft    [] Firm   []  Obese                   [] Diarrhea  [x] Bowel Sounds  [] Nausea  [] Vomiting     / URINE ASSESSMENT:                   [] Voiding   [x] Oliguria  [] Anuria   []  Cline                  [] Incontinent  []  Incontinent Brief []  Fecal Management System     PAIN:  [x] 0 []1  []2   []3   []4   []5   []6   []7   []8   []9   []10            Scale 0-10  Action/Follow Up:    MOBILITY:  [x] Bed    [] Stretcher      All Vitals and Treatment Details on Attached [address]: [facility]          Room # 178/01   [] 1st Time Acute      [] Stat       [x] Routine      [] Urgent     [x] Acute Room  []  Bedside  [] ICU/CCU  [] ER   Isolation Precautions:  [x] Dialysis    Contact       ALLERGIES:     Allergies   Allergen Reactions    Albumin, Human 25 % Itching     Headache - severe migraine like, itchy eyes, runny nose    Ciprofloxacin Hives    Cyclopentolate Unknown (comments)    Iron Sucrose Diarrhea    Statins-Hmg-Coa Reductase Inhibitors Other (comments)     Body ache      Code Status:  DNR     Hepatitis Status     Lab Results   Component Value Date/Time    Hepatitis B surface Ag <0.10 11/17/2021 02:20 PM    Hepatitis B surface Ab 29.33 11/17/2021 02:20 PM        Current Labs:      Lab Results   Component Value Date/Time    WBC 10.1 11/22/2021 05:27 AM    Hemoglobin, POC 8.8 (L) 09/24/2020 08:45 AM    HGB 9.4 (L) 11/22/2021 05:27 AM    Hematocrit, POC 26 (L) 09/24/2020 08:45 AM    HCT 30.5 (L) 11/22/2021 05:27 AM    PLATELET 719 (H) 07/07/0601 05:27 AM    .0 (H) 11/22/2021 05:27 AM     Lab Results   Component Value Date/Time    Sodium 137 11/22/2021 05:27 AM    Potassium 5.4 11/22/2021 05:27 AM    Chloride 101 11/22/2021 05:27 AM    CO2 27 11/22/2021 05:27 AM    Anion gap 9 11/22/2021 05:27 AM    Glucose 106 (H) 11/22/2021 05:27 AM    BUN 58 (H) 11/22/2021 05:27 AM    Creatinine 7.52 (H) 11/22/2021 05:27 AM    BUN/Creatinine ratio 8 (L) 11/22/2021 05:27 AM    GFR est AA 6 (L) 11/22/2021 05:27 AM    GFR est non-AA 5 (L) 11/22/2021 05:27 AM    Calcium 9.4 11/22/2021 05:28 AM          DIET:  DIET ADULT ORAL NUTRITION SUPPLEMENT  DIET ADULT      PRIMARY NURSE REPORT:   Pre Dialysis:  JOSE ANTONIO Pearl     Time: 1218        EDUCATION:    [x] Patient [] Other           Knowledge Basis: []None [x]Minimal [] Substantial []Not able to assess at this time  Barriers to learning  [x]N/A  []Intubated/Ventilated  []Sedated   [] Access Care     [] S&S of infection  [] Fluid Management  [] K+   [x] Procedural    []Albumin   [] Medications   [] Tx Options   [] Transplant   [] Diet   [] Other   Teaching Tools:  [x] Explain  [] Demo  [] Handouts [] Video  Patient response: [x] Verbalized understanding  [] Teach back  [] Return demonstration   [] Requires follow up      [x]Time Out/Safety Check  [x] Extracorporeal Circuit Tested for integrity       [address] - Before each treatment:     Machine Number:                   [address]                                    [x] Unit Machine # 6 with centralized RO                                                                          Alarm Test:  Pass time 1212            [x] RO/Machine Log Complete    Machine Temp    36.0             Dialysate: pH  7.4    Conductivity: Meter 13.8     HD Machine  14      TCD: 14  Dialyzer Lot # [id number]     Blood Tubing Lot # [id number]   Saline Lot # [id number]     CHLORINE TESTING-Before each treatment and every 4 hours    Total Chlorine: [x] less than 0.1 ppm  Initial Time Check: 1300       4 Hr/2nd Check Time:    (if greater than 0.1 ppm from Primary then every 30 minutes from Secondary)     TREATMENT INITIATION - with Dialysis Precautions:   [x] All Connections Secured              [x] Saline Line Double Clamped   [x] Venous Parameters Set               [x] Arterial Parameters Set    [x] Prime Given 250ml NSS              [x]Air Foam Detector Engaged      Treatment Initiation Note:  1733; Pt arrived to HD without any complaints. Pt A &O X 3, follows commands, no distress noted on RA. LUE AVF/AVG assessed no abnormalities noted, bruit and thrill strong. AVG/AVF accessed using  15G needles without any difficulty. Good flows achieved from both needles    1316;  Time out performed per policy, HD commenced, pt tolerated well. During Treatment Notes:  1330;HD in good progress;VSS. Vascular access visible with arterial and venous line connections intact. 1400;HD in good progress,BP Low 108/38, UF briefly paused,. Vascular access visible with arterial and venous line connections intact. 1430;HD in good progress,VSS, UF on. .Vascular access visible with arterial and venous line connections intact. 1500;HD in good progress,VSS. Vascular access visible with arterial and venous line connections intact. 1530;HD in good progress,VSS. Vascular access visible with arterial and venous line connections intact. 1600;HD in good progress, VSS. Vascular access visible with arterial and venous line connections intact. 1615; Dialysis treatment completed. Medication Dose Volume Route Time Dat Nurse, Title   Hectorol 4mcg 2ml HD 1615 MACK Laboy RN     Post Assessment  Dialyzer Cleared:[] Good [x] Fair  [] Poor  Blood processed:  60.6 L  UF Removed:  2037 Ml  Post /52  Pulse  68 Resp  18   Temp 97.8  [x] Oral [] Axillary Lungs: [] Clear                [x] No change from initial assessment         Post Tx Vascular Access: [] N/A  AVF/AVG: Bleeding stopped with  Arterial Pressure for 8 min   Venous Pressure for 8 min               Cardiac:  [x] Regular   [] Irregular   Rhythm:  [] Monitored   [x] Not Monitored   Edema;     [x]    None;   []   Generalised   Skin:[x] Warm  [x] Dry [] Diaphoretic               [] Flushed  [] Pale [] Cyanotic   Pain:  [x]0  []1 []2  []3 []4  []5  []6 []7 []8  []9  []10       Post Treatment Note:  9075; VSS. Arterial and venous needles removed and pressure applied until hemostasis achieved. Dry dressings applied. Bruit/Thrill present above and below dressings. Dressing dry , clean and intact        POST TREATMENT PRIMARY NURSE HANDOFF REPORT:   Post Dialysis:  JOSE ANTONIO Pearl                Time:  1223       Abbreviations: AVG-arterial venous graft, AVF-arterial venous fistula, IJ-Internal Jugular, Subcl-Subclavian, Fem-Femoral, Tx-treatment, AP/HR-apical heart rate, DFR-dialysate flow rate, BFR-blood flow rate, AP-arterial pressure, -venous pressure, UF-ultrafiltrate, TMP-transmembrane pressure, [place]-Venous, Art-Arterial, RO-Reverse Osmosis

## 2021-11-22 NOTE — PROGRESS NOTES
Problem: Mobility Impaired (Adult and Pediatric)  Goal: *Therapy Goal (Edit Goal, Insert Text)  Description: Physical Therapy Short Term Goals  Initiated 11/20/2021 and to be accomplished within 7 day(s) on 11/27/2021  1. Patient will move from supine to sit and sit to supine , scoot up and down, and roll side to side in bed with supervision/set-up. 2.  Patient will transfer from bed to chair and chair to bed with moderate assistance  using the least restrictive device, consistently maintaining TTWB left LE. 3.  Patient will perform sit to stand with minimal to moderate assistance without additional assistance left LE to consistently maintain TTWB. 4.  Patient will perform w/c mobility SBA level over even surfaces for at least 200 ft before fatiguing. 5.  Patient will be able to perform static standing for at least 2 minute duration with 1-2 UE support before needing seated rest, maintaining TTWB left LE appropriately. Physical Therapy Long Term Goals  Initiated 11/20/2021 and to be accomplished within 28 day(s) 12/18/2021  1. Patient will move from supine to sit and sit to supine , scoot up and down, and roll side to side in bed with modified independence. 2.  Patient will transfer from bed to chair and chair to bed with modified independence using the least restrictive device. 3.  Patient will perform sit to stand with supervision/set-up. 4.  Patient will perform gait training if and when appropriate based on ability to consistently maintain TTWB left LE. 5.  Patient will perform w/c mobility mod I over even surfaces for at least 200 ft  6. Patient will negotiate w/c ramp with distant supervision.        Outcome: Progressing Towards Goal   PHYSICAL THERAPY TREATMENT    Patient: Aashish Dang [de-identified]64 y.o. female)  Date: 11/22/2021  Diagnosis: Multiple closed pelvic fractures without disruption of pelvic ring ([facility]) [S32.82XA] Multiple closed pelvic fractures without disruption of pelvic ring [facility])  Precautions: Contact, TTWB, Fall, Skin  Chart, physical therapy assessment, plan of care and goals were reviewed. Time In:1000  Time Out:1130    Patient seen for: AM; Balance activities; Patient education; Therapeutic exercise; Transfer training; Wheelchair mobility    Pain:  Pt pain was reported as 0 pre-treatment. Pt pain was reported as 6 post-treatment. Intervention: Patient reports that she has not needed pain medication today. Patient identified with name and : Yes    SUBJECTIVE:      I know it does not hurt like it did yesterday or like it did in the hospital.    OBJECTIVE DATA SUMMARY:    Objective: Supervising and evaluating therapist clarified current WBing status and received verification from orthopedic PA, that order could be changed and upgraded to [address], 50% of patient's body weight through LLE. Information relayed to facility physician for order change in the system. GROSS ASSESSMENT Daily Assessment            BED/MAT MOBILITY Daily Assessment     Rolling Right : 4 (Minimal assistance) (with use of bed rails)  Rolling Left : 4 (Contact guard assistance) (with use of bed rails)  Supine to Sit : 4 (Minimal assistance)  Sit to Supine : 5 (Supervision) (with increased time and use of bed rails)      TRANSFERS Daily Assessment     Transfer Type: SPT with walker  Other:  (stand step with RW)  Transfer Assistance : 3 (Moderate assistance )  Sit to Stand Assistance: Moderate assistance  Car Transfers: Not tested Patient was challenged with using TTWBing, LLE during transfers from [facility] <> bed/mat table with moderate assistance to maintain LLE from touching ground. Patient acquired a strange placement of wrapping her left LE around her right LE as she attempted to shimmy from one surface to another. Patient was educated on the need to stop this tendency 2/2 poor safety awareness.  She corrected her foot position and was able to shift and shimmy on her right foot only during transfer to mat table. During session, evaluating therapist was able to confirm from the current orthopedic PA that he was increasing her [address] status to [address] at 50% of her body weight. Patient continued to have difficulty with sit to stand transfers and required moderate assistance and moderate verbal cues for hand placement on the  of the RW and not the center of the frame. She did report fear of falling and she was educated on therapist providing her with the safest techniques in order to reduce her risk of falling. She acknowledged understanding of education and received it well. GAIT Daily Assessment    Gait Description (WDL)      Gait Abnormalities Decreased step clearance    Assistive device Walker, rolling    Ambulation assistance - level surface 3 (Moderate assistance)    Distance  (25 ft, 10 ft)    Ambulation assistance- uneven surface  (NA)    Comments Patient required moderate assistance initially with PWBing and assistance eased to minimal assistance after approximately 10 feet of 100% with PWBing. Patient with slow movements and requires increased time to complete, however she was able to focus and direct her attention to what she was able to accomplish during this session. BALANCE Daily Assessment     Sitting - Static: Good (unsupported)  Sitting - Dynamic: Fair (occasional)  Standing - Static: Fair  Standing - Dynamic : Impaired        WHEELCHAIR MOBILITY Daily Assessment     Able to Propel (ft):  (220 ft)  Functional Level:  (5)  Curbs/Ramps Assist Required (FIM Score): 0 (Not tested)  Wheelchair Setup Assist Required : 2 (Maximal assistance)  Wheelchair Management: Manages left brake; Manages right brake        THERAPEUTIC EXERCISES Daily Assessment       BLE-toe raises, LAQs, hip flexion, abd/add x 10 reps each; 2 sets. ASSESSMENT:  Patient is seen for deficits in strength, mobility, transfers, ambulation, safety and balance.  Patient is agreeable to session to address deficits this am session. During session, evaluating physical therapist clarified with orthopedic PA that patient's [address] status may be changed to [address] at 50% of her body weight. Patient was challenged with short distances using [address] and she was able to maintain status with 100% accuracy and with some discomfort, however improved from previous sessions. Continue with skilled rehab to address deficits for improved functional independence. Progression toward goals:  []      Improving appropriately and progressing toward goals  [x]      Improving slowly and progressing toward goals  []      Not making progress toward goals and plan of care will be adjusted      PLAN:  Patient continues to benefit from skilled intervention to address the above impairments. Continue treatment per established plan of care. Discharge Recommendations:  HHPT  Further Equipment Recommendations for Discharge:  WC-elevating leg rests, possible WC ramp, RW      Estimated Discharge Date:12/17/2021    Activity Tolerance:   Patient with improved outlook after session today. Please refer to the flowsheet for vital signs taken during this treatment.     After treatment:   [] Patient left in no apparent distress in bed  [x] Patient left in no apparent distress sitting up in chair  [] Patient left in no apparent distress in w/c mobilizing under own power  [] Patient left in no apparent distress dining area  [] Patient left in no apparent distress mobilizing under own power  [x] Call bell left within reach  [x] Nursing notified  [] Caregiver present  [] Bed alarm activated   [] Chair alarm activated      Jayden Schulz  11/22/2021

## 2021-11-22 NOTE — CONSULTS
ARU PSYCHOLOGICAL SCREENING    Assessment Initiated:  November 22, 2021    Rehab Diagnosis:  Left Acetabular and Pelvic Fx's    Pertinent Physical/Psychiatric History:     Patient Active Problem List   Diagnosis Code    Nausea & vomiting R11.2    Pyelonephritis Z70    Complicated urinary tract infection N39.0    Anemia of chronic renal failure N18.9, D63.1    Anemia D64.9    Hypotension I95.9    UTI (urinary tract infection) N39.0    Type 2 diabetes mellitus, without long-term current use of insulin ([facility]) E11.9    CKD (chronic kidney disease) stage 5, GFR less than 15 ml/min ([facility]) N18.5    Acquired hypothyroidism E03.9    GERD (gastroesophageal reflux disease) O43.3    Complicated UTI (urinary tract infection) N39.0    Disease of the lower urinary tract N39.9    Sepsis ([facility]) A41.9    Tachycardia R00.0    ESRD (end stage renal disease) on dialysis ([facility]) N18.6, Z99.2    Hydronephrosis N13.30    Septic shock ([facility]) A41.9, R65.21    Afib ([facility]) I48.91    Atrial fibrillation ([facility]) I48.91    Inferior pubic ramus fracture, left, closed, initial encounter ([facility] S32.592A    Left acetabular fracture ([facility]) S32.402A    Closed fracture of left superior pubic ramus ([facility]) S32.512A    Multiple closed pelvic fractures without disruption of pelvic ring ([facility] S32.82XA    Depression F32. A       Patient denies outpatient mental health services but is currently Rx Cymbalta for mood stability. She denies any history of substance abuse nor dependence      OBJECTIVE  GENERAL OBSERVATIONS  Willingness to participate in program: [x] good   [] fair [] indifferent [] poor    General Appearance:  Patient appears casually and appropriately dressed and groomed, sitting upright in wheelchair after breakfast and not in acute distress    Sensory Impairments:  She reports having hearing loss, especially in left ear; she is able to understand inquiry and respond intelligibly.   She does not appear to have any problems with voluntary movement in upper extremities    [employer] Affiliation:  Orthodox    Admission Assessment  Discharge Status   [x] alert  [] lethargic  [] difficult to arouse  [] fluctuating  [] other: Level of Consciousness [x] alert  [] lethargic  [] difficult to arouse  [] fluctuating  [] other:   [x] person  [x] place  [x] time  [x] situation Oriented [x] person  [x] place  [x] time  [x] situation   [x] within normal limits  [] impaired       [] mild        [] moderate        [] severe Attention [x] within normal limits  [] impaired       [] mild        [] moderate        [] severe   [x] within normal limits  [] impaired       [] mild        [] moderate        [] severe Memory [x] within normal limits  [] impaired       [] mild        [] moderate        [] severe   [] appropriate to situation  [x] depressed  [x] anxious  [] angry   [x] fearful  [] emotionally labile  [x] other: Patient reports history of mood dysphoria but is not in acute distress at present Mood [] appropriate to situation  [] depressed  [x] anxious  [] angry   [] fearful  [] emotionally labile  [] other:   [x] appropriate  [] flat  [] inappropriate to content of speech Affect [x] appropriate  [] flat  [] inappropriate to content of speech   [x] appropriate  [] aggressive/agitated  [] withdrawn  [] inappropriate  [x] other: Patient presented as calm and composed, neither restless nor agitated Behavior [x] appropriate  [] aggressive/agitated  [] withdrawn  [] inappropriate  [] other:   [x] good  [] limited  [] denial  [] none Insight Into Illness [x] good  [] limited  [] denial  [] none   [x] intact  [] impaired       [] mild        [] moderate        [] severe       Describe: She will benefit from cues and prompts for maximum problem solving and recall, especially if momentarily distracted or preoccupied Cognition [x] intact  [x] impaired       [x] mild        [] moderate        [] severe       Describe:    [x] coping  [] demonstrates poor adjustment  [] undetermined       As evidenced by: Patient is coping to the extent that she is hopeful for recovery, but she is very obviously stressed by events unfolding Patient Adjustment to Disability [x] coping  [] demonstrates poor adjustment  [] undetermined       As evidenced by:    [] coping  [] demonstrates poor adjustment  [x] undetermined      As evidenced by: Not available on evaluation; patient reports her fear that she is a burden to her son and daughter in law Family Adjustment to Disability [x] coping  [] demonstrates poor adjustment  [] undetermined      As evidenced by:      ASSESSMENT  Clinical Impression:  Patient is a pleasant and cooperative, 64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 She has relocated from [place], [place] within the past several years to live with her son and daughter in law in [employer] residence that is two story, with four steps to enter; and, she sleeps on second floor with twelve steps to ascend, but could possibly sleep on first floor without full bathroom access. Patient describes rather difficult medical history as she is now on dialysis with ESRD. In turn, the combination of various medical problems, exacerbated by her now, forced dependency, is creating significant distress for her and she is worried about how she will manage post hospital.  Having a medical background, patient seems familiar with various aspects of a hospital treatment milieu; however, she will benefit from further education to best understand her present recovery issues and thereby identify realistic goals for herself in recovery. Emotionally, patient is stressed and worried for herself, but especially concerned about now increasing the burden for her son.   She described that he had \"insisted\" that she not return to live in [place] because she was then requiring some assist; and, now, she obviously recognizes that the problems she presents are even greater in terms of need for care. Patient is already Rx Cymbalta for mood stability and possibly some pain relief but is not followed in outpatient mental health services. She will benefit from support and encouragement while in treatment on ARU, but also education to better understand issues related to dependency versus independence for herself in the near term. Cognitively, patient is alert and oriented. She is entirely able to understand discussion and she both asks appropriate questions as well as answers commensurate with inquiry. Hearing loss may be a barrier, however, for her to always understand what is requested and some compensatory efforts may be necessary. Furthermore, cues, prompts, repetition, redirection and reinforcement will further enable her to work to the best of her ability, including problem solve and carry over information. Patient Strengths:  Alert, oriented, pleasant, cooperative, has familiarity with hospital milieu, and is motivated to improve    Patient Preferences:  Patient understands that her discharge is limited to returning to her son and daughter in [address] [place] [address], but is worried about further burdening them    Rehab Potential:  Guarded, depending on mood and functional ability/endurance    Educational Needs: Under each heading list the specific items in which the patient or family will need education/training.  Example: hip precautions, use of walker, ADL equipment, neglect, judgment, adjustment, etc.     Special considerations or accommodations for teaching:  [x] Yes     [] No     [] NA  If Yes, explain: Patient needs to better understand forced dependency in recovery and accept need for same with family support available Discharge Status    Completed Demonstrated/ Verbalized Understanding    Yes No Yes No   Info regarding disability:  [] [] [] []   Adjustment:  [] [] [] []   Cognition:  [] [] [] []   Other: [] [] [] []   Other: [] [] [] []   If education not completed, explain: [] [] [] []     PLAN  Problem: Limited insight about all recovery  Long Term Goal: Increase insight  Intervention: Patient education  At Discharge - LTG Achieved: [x] Yes [] No If not achieved, explain:    Problem: Stress with acute and cumulative medical problems  Long Term Goal: Mood stability and minimize subjective distress  Intervention: Rx and support   At Discharge - LTG Achieved: [x] Yes [] No If not achieved, explain:    Problem: Dependency versus independence  Long Term Goal: Accept forced dependency  Intervention: Patient education and support   At Discharge - LTG Achieved: [x] Yes [] No If not achieved, explain:    Problem: Decreased self confidence and esteem  Long Term Goal: Improve self concept and self esteem  Intervention: Positive reinforcement and ego support  At Discharge - LTG Achieved: [x] Yes [] No If not achieved, explain:    Problem:   Long Term Goal:   Intervention:   At Discharge - LTG Achieved: [] Yes [] No If not achieved, explain:    Von Valentin, THE [facility]  11/22/2021 3:01 PM    DISCHARGE STATUS    Clinical Impressions: Late entry for patient's discharge from ARU. She was able to regain strength and endurance and return to home with family's support. Patient remained on mood stabilizing medication at time of discharge which may have also provided pain relief. She was not presenting with acute distress on discharge and felt satisfied with services received on ARU.     Follow-up Services Recommended Purpose                 Von Valentin, PHD  Discharge Date/Time:

## 2021-11-22 NOTE — INTERDISCIPLINARY ROUNDS
[facility] PHYSICAL REHABILITATION  [address] [place] [address], [address]    INPATIENT REHABILITATION  PRE-TEAM CONFERENCE SUMMARY     Date of Conference: 11/23/2021    Patient Information:        Name: Justina Mcgarry Age / Sex: 64 y.o. / female   CSN: [id number] MRN: [id number]   Admit Date: 11/19/2021 Length of Stay: 3 days     Primary Rehabilitation Diagnosis  1. Impaired Mobility and ADLs  2. Multiple closed pelvic fractures (comminuted fracture involving the L anterior acetabular wall w/ involvement of the base of the left superior pubic ramus w/o significant displacement)    Comorbidities  Patient Active Problem List   Diagnosis Code    Nausea & vomiting R11.2    Pyelonephritis Z33    Complicated urinary tract infection N39.0    Anemia of chronic renal failure N18.9, D63.1    Anemia D64.9    Hypotension I95.9    UTI (urinary tract infection) N39.0    Type 2 diabetes mellitus, without long-term current use of insulin ([facility]) E11.9    CKD (chronic kidney disease) stage 5, GFR less than 15 ml/min ([facility]) N18.5    Acquired hypothyroidism E03.9    GERD (gastroesophageal reflux disease) B96.3    Complicated UTI (urinary tract infection) N39.0    Disease of the lower urinary tract N39.9    Sepsis ([facility]) A41.9    Tachycardia R00.0    ESRD (end stage renal disease) on dialysis ([facility]) N18.6, Z99.2    Hydronephrosis N13.30    Septic shock ([facility]) A41.9, R65.21    Afib ([facility]) I48.91    Atrial fibrillation ([facility]) I48.91    Inferior pubic ramus fracture, left, closed, initial encounter ([facility] S32.592A    Left acetabular fracture ([facility]) S32.402A    Closed fracture of left superior pubic ramus ([facility]) S32.512A    Multiple closed pelvic fractures without disruption of pelvic ring ([facility] S32.82XA    Depression F32. A          Therapy:     FIM SCORES Initial Assessment Weekly Progress Assessment 11/22/2021   Eating Functional Level: 5  Comments: setup on bedside table  5   Swallowing     Grooming 5 Grooming Assistance : 5 (Supervision) (setup EOB)   Bathing 3 Bathing Assistance, Upper: 5 (Supervision) (setup EOB)  Position Performed: Seated edge of bed  Bathing Assistance, Lower : 4 (Minimal assistance) (asst to balance in standing amd maintaing weightbearing rest)  Adaptive Equipment: Long handled sponge  Position Performed: Seated edge of bed; Standing   Upper Body Dressing Functional Level: 4  Items Applied/Steps Completed: Pullover (4 steps)  Comments: set up while seated on EOB Dressing Assistance : 5 (Supervision) (setup EOB)   Lower Body Dressing Functional Level: 3  Items Applied/Steps Completed: Shoe, left (1 step), Shoe, right (1 step), Sock, left (1 step), Sock, right (1 step), Elastic waist pants (3 steps)  Comments: min vc to direction for safety to stand & pull pants over hips Dressing Assistance : 4 (Minimal assistance)  Position Performed: Seated edge of bed; Standing  Adaptive Equipment Used: Walker   Toileting Functional Level: 3  Comments: clothing management wearing scrubs due to not having her clothes here yet  3   Bladder 1 0   Bowel  1 1   Toilet Transfer [place] Toilet Transfer Score: 4  Comments: rw  4   Tub/Shower Transfer [place] Tub/Shower Transfer Score: 4  Comments: uses rw & TTWB        Comprehension Primary Mode of Comprehension: Auditory  Score: 5        Expression Primary Mode of Expression: Verbal  Comments: expresses her needs appropriately        Social Interaction Score: 4  Comments: polite & nice     Problem Solving Score: 4  Comments: she knew to don her shoes so not to slide on the floor with her socks on before we used RW to go from her bed to chair     Memory Score: 4  Comments: appears WNL with sharing her medical hx & family dynamics       FIM SCORES Initial Assessment Weekly Progress Assessment 11/22/2021   Bed/Chair/Wheelchair Transfers Transfer Type: SPT with walker (mod/max A due to loss of balance and needing recovery)  Other: lateral transfer with transfer board (needing mod/max A)  Transfer Assistance :  (mod/max A for stabilization and for maintaining TTWB left LE)  Sit to Stand Assistance: Moderate assistance (lifting assistance, support left LE to ensure TTWB)  Car Transfers: Not tested  Car Type: NT Transfer Type: SPT with walker  Transfer Assistance : 3 (Moderate assistance )  Sit to Stand Assistance:  Moderate assistance  Car Transfers: Not tested   Bed Mobility Rolling Right 4 (Minimal assistance)   Rolling Left 4 (Minimal assistance)   Supine to Sit 3 (Moderate assistance) (flat head of bed, no railings, needing minimal trunk support)   Sit to Stand Moderate assistance (lifting assistance, support left LE to ensure TTWB)   Sit to Supine 3 (Moderate assistance) (head of bed flat, no rails, assist w/ left LE and reposition)    Rolling Right   4 (Minimal assistance) (with use of bed rails)   Rolling Left   4 (Contact guard assistance) (with use of bed rails)   Supine to Sit   4 (Minimal assistance)   Sit to Stand   Moderate assistance   Sit to Supine   5 (Supervision) (with increased time and use of bed rails)      Locomotion (W/C) Able to Propel (ft): 230 feet  Functional Level: 4  Curbs/Ramps Assist Required (FIM Score): 0 (Not tested)  Wheelchair Setup Assist Required : 2 (Maximal assistance)  Wheelchair Management: Manages left brake, Manages right brake (assistance with armrests, footrests) Able to propel: 210 feet  Function level 5  Curbs/ramps assist required: 0 (not tested)  Wheelchaiir set up assist required: 2 (maximum assistance)    Wheelchair management: manages right and left brakes      Locomotion (W/C distance)    (220 ft)   Locomotion (Walk) 0 (Not tested)    Walker, rolling   Locomotion (Walk dist.) 0 Feet (ft)     Steps/Stairs Steps/Stairs Ambulated (#): 0  Level of Assist : 0 (Not tested)  Rail Use:  (NT)  Steps/stairs ambulated (#): 0  Level of assistance: 0 (not tested)           Nursing:     Neuro:   AAA&O x    4        Respiratory:   [x] WNL   [] O2 LPM:   Other:  Peripheral Vascular:   [] TEDS present   [x] Edema present ____ Grade   Cardiac:   [x] WNL   [] OtherGenitourinary:   [x] continent   [] incontinent   [] chua  Abdominal ____11/23/2021___ LBM  GI: ______11/23/2021_ Diet ____regular__ Liquids __thin___ tube feeds  Musculoskeletal: ____ ROM Transfers _____ Assistive Device Used  ___min_ Level of Assistance  Skin Integumentary:   [x] Intact   [] Not Intact   __________Preventative Measures  Details______________________________________________________________  Pain: [x] Controlled   [] Not Controlled   Pain Meds:   [] Scheduled   [] PRN        Registered Dietitian / Nutrition:   No data found. Pt seen this morning. She reported that her appetite was not good yesterday, but is feeling better today and appetite has improved. Had fair po intake yesterday; good intake today for breakfast, eating 85% of meal. She reported receiving eggs this morning, of which she has previously stated that she does not like. Preference has already been updated in diet order; this writer discussed report with Foodservice. Pt also reported an additional preference, which this writer will add in diet order; discussed with Foodservice. Supplements:          [x] Yes: Magic cup TID    [] No      Amount of supplement consumed:  100%      Intake/Output Summary (Last 24 hours) at 11/22/2021 1317  Last data filed at 11/22/2021 1241  Gross per 24 hour   Intake 440 ml   Output --   Net 440 ml                                Last bowel movement: 11/22      Interdisciplinary Team Goals:     1. Discipline  Physical Therapy    Goal  Pt will perform bed mobility including rolling from side to side, scooting up/down bed, supine<>sit with minimal assistance and increased safety awareness. Barrier  patient with decreased strength, mobility, transfers, ambulation and safety awareness.     Intervention  Patient to participate in strength, ambulation, transfers and safety awareness training. Goal written by:   Lucian Spurling, LPTA     2. Discipline  Occupational Therapy    Goal  Pt will perform LB bathing/dressing with S using RW following weight bearing precautions    Barrier  Decreased BUE strength, standing balance, and endurance     Intervention Therex, Theract, Self Care Retraining, Education    Goal written by:  NELIA Locke/L      3. Discipline  Speech Therapy    Goal      Barrier      Intervention      Goal written by:       4. Discipline  Nursing    Goal  Patient will request pain medication at onset of pain before intensity increases    Barrier  opoid overuse,chronic illness    Intervention  assessing and documenting pain,pain management programs    Goal written by:  Pilar Brannon LPN     5. Discipline  Clinical Psychology    Goal  Maintain mood and behavior stability in treatment effort on ARU    Barrier  Stress with immediate medical circumstances and other, chronic medical problems    Intervention  Support , as needed and Rx    Goal written by:  Alon Jain, PhD     6. Discipline  Nutrition / Dietetics    Goal  - PO nutrition intake will meet >75% of patient estimated nutritional needs within the next 7 days. Barrier  appetite, food preferences    Intervention  continue po diet and nutrition supplements. Update food preference in diet order. Encourage/ monitor po intake of meals and supplements. Pt discussed with Foodservice. Goal written by:  Miladis Cordova RD       Disposition / Discharge Planning:      Follow-up services:  [x] Physical Therapy             [x] Occupational Therapy       [] Speech Therapy           [] Skilled Nursing      [] Medical Social Worker   [] Aide        [] Outpatient      [] vs   [x] Home Health  [] vs       [] to progress to outpatient       [] with 24-hour supervision       [x] with 24-hour assistance   [] Marc ESTRADA recommendations:  bedside commode   Estimated discharge date:  12/17/2021 Discharge Location:  [x] Home  [] versus    [] [facility]    [] [address]   [] Other:           Electronic Signatures:      Signature Date Signed   Physical Therapist    JOSE Evans PT, DPT  11/22/2021 11/23/2021   Occupational Therapist    Chelsea Reynoso, MSOTR/L   11/22/21   Speech Therapist         Recreational Therapist    Kortney Monk, CTRS 11/22/2021   Nursing    Amanda Jeronimo LPN/ Samantha Del Valle RN  11/23/2021   Dietitian    Genia Carr, RADHA  11/22/2021   Clinical Psychologist    Sally Benton, PhD  11/22/2021    Physician    Karma Palumbo MD   11/22/2021        ABHISHEK Recinos  11/22/2021     Opportunity to share with family/caregiver[] YES [] NO    Relationship to patient____________________________________________________      The above information has been reviewed with the patient in a language that they can understand. Opportunity for comments and questions has been provided and a signed attestation has been scanned into the \"media tab\" of the EMR.       Patient Signature: ______________________________________________________    Date Signed: __________________________________________________________

## 2021-11-22 NOTE — PROGRESS NOTES
[x] Psychology  [] Social Work [] Recreational Therapy    INTERVENTION  UNITS/TIME OF SERVICE   Assessment  November 22, 2021   Supportive Counseling    Orientation    Discharge Planning    Resource Linkage              Progress/Current Status    Patient seen for Psychological Evaluation as requested on admission to ARU by MD.  Patient found sitting upright in wheelchair in bedroom and immediately responsive to my contact. She presents as generally calm and composed, able to understand all inquiry and she responds intelligibly. Patient reports having hearing loss, especially in left ear but is able to maintain conversation. Patient able to describe recent medical circumstances precipitating this admit to ARU. In addition, she described her worry about burdening her son locally, with whom she resides with daughter-in-law, now for several years and since he insisted that she relocate from her long time residence in [place], [place]. Patient's fear of being a burden is a barrier for her in thinking clearly about immediate treatment needs; however, she insists that she wants to improve. Unfortunately, she is further stressed by multiple medical problems in recent history that include the fact that she is now on dialysis with ESRD. Patient is currently Rx Cymbalta for mood stability and is aware that this is being offered to her on ARU. She will benefit from further education to best understand the parameters of her recovery and in order to identify realistic goals for herself in recovery. 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      Karie Diamond, THE [facility] 11/22/2021 2:53 PM

## 2021-11-22 NOTE — PROGRESS NOTES
[address]  [address] [place] [address], [address]     INPATIENT REHABILITATION  DAILY PROGRESS NOTE     Date: 11/22/2021    Name: Adarsh Amador Age / Sex: 64 y.o. / female   CSN: [id number] MRN: [id number]   [facility] Date: 11/19/2021 Length of Stay: 3 days     Primary Rehab Diagnosis: Impaired Mobility and ADLs secondary to Multiple closed pelvic fractures (comminuted fracture involving the L anterior acetabular wall w/ involvement of the base of the left superior pubic ramus w/o significant displacement)      Subjective:     Patient seen and examined. Blood pressure controlled. Patient's Complaint:   No significant medical complaints    Pain Control: stable, mild-to-moderate joint symptoms intermittently, reasonably well controlled by current meds      Objective:     Vital Signs:  Patient Vitals for the past 24 hrs:   BP Temp Temp src Pulse Resp SpO2   11/22/21 1330 (!) 113/56 -- -- 63 -- --   11/22/21 1316 (!) 108/59 -- -- 62 -- --   11/22/21 1310 115/60 97.7 °F (36.5 °C) Oral 64 18 --   11/22/21 1231 -- -- -- 72 -- --   11/22/21 1227 (!) 112/54 -- -- -- -- --   11/22/21 0740 (!) 114/55 97.1 °F (36.2 °C) -- 76 14 96 %   11/21/21 2048 (!) 119/59 97 °F (36.1 °C) -- 70 18 98 %   11/21/21 1554 (!) 107/57 97.7 °F (36.5 °C) -- 75 14 96 %        Physical Examination:  GENERAL SURVEY: Patient is awake, alert, oriented x 3, sitting comfortably on the chair, not in acute respiratory distress.   HEENT: pink palpebral conjunctivae, anicteric sclerae, no nasoaural discharge, moist oral mucosa  NECK: supple, no jugular venous distention, no palpable lymph nodes  CHEST/LUNGS: symmetrical chest expansion, good air entry, clear breath sounds  HEART: adynamic precordium, good S1 S2, no S3, regular rhythm, no murmurs  ABDOMEN: obese, bowel sounds appreciated, soft, non-tender  EXTREMITIES: pink nailbeds, no edema, full and equal pulses, no calf tenderness   NEUROLOGICAL EXAM: The patient is awake, alert and oriented x3, able to answer questions fairly appropriately, able to follow 1 and 2 step commands. Able to tell time from the wall clock. Cranial nerves II-XII are grossly intact. No gross sensory deficit. Motor strength is 4 to 4+/5 on BUE and RLE, 2+ to 3-/5 on the left hip, 3 to 3+/5 on the left knee and left ankle.        Current Medications:  Current Facility-Administered Medications   Medication Dose Route Frequency    allopurinoL (ZYLOPRIM) tablet 100 mg  100 mg Oral Q MON, WED & FRI    epoetin caitlin-epbx (RETACRIT) injection 8,000 Units  8,000 Units SubCUTAneous Q MON, WED & FRI    cetirizine (ZYRTEC) tablet 10 mg  10 mg Oral DAILY    polyethylene glycol (MIRALAX) packet 17 g  17 g Oral DAILY    acetaminophen (TYLENOL) tablet 650 mg  650 mg Oral Q4H PRN    bisacodyL (DULCOLAX) tablet 10 mg  10 mg Oral Q48H PRN    amiodarone (CORDARONE) tablet 200 mg  200 mg Oral DAILY    cefpodoxime (VANTIN) tablet 200 mg  200 mg Oral Q24H    midodrine (PROAMATINE) tablet 10 mg  10 mg Oral TID WITH MEALS    acetaminophen (TYLENOL) tablet 650 mg  650 mg Oral TID    apixaban (ELIQUIS) tablet 5 mg  5 mg Oral BID    HYDROmorphone (DILAUDID) tablet 1 mg  1 mg Oral Q8H PRN    meclizine (ANTIVERT) tablet 12.5 mg  12.5 mg Oral TID PRN    DULoxetine (CYMBALTA) capsule 20 mg  20 mg Oral DAILY    vitamin B complex-folic acid 0.4 mg tablet  1 Tablet Oral DAILY    cyanocobalamin tablet 1,000 mcg  1,000 mcg Oral DAILY    L. acidophilus,casei,rhamnosus (BIO-K PLUS) capsule 1 Capsule  1 Capsule Oral DAILY    ascorbic acid (vitamin C) (VITAMIN C) tablet 500 mg  500 mg Oral DAILY    cholecalciferol (VITAMIN D3) (1000 Units /25 mcg) tablet 2,000 Units  2,000 Units Oral BID    latanoprost (XALATAN) 0.005 % ophthalmic solution 1 Drop  1 Drop Both Eyes QPM    levothyroxine (SYNTHROID) tablet 125 mcg  125 mcg Oral 6am    pantoprazole (PROTONIX) tablet 20 mg  20 mg Oral ACB    ondansetron (ZOFRAN ODT) tablet 4 mg 4 mg Oral TID PRN    lidocaine 4 % patch 1 Patch  1 Patch TransDERmal Q24H    gabapentin (NEURONTIN) capsule 200 mg  200 mg Oral Q MON, WED & FRI    doxercalciferoL (HECTOROL) 4 mcg/2 mL injection 4 mcg  4 mcg IntraVENous DIALYSIS MON, WED & FRI       Allergies: Allergies   Allergen Reactions    Albumin, Human 25 % Itching     Headache - severe migraine like, itchy eyes, runny nose    Ciprofloxacin Hives    Cyclopentolate Unknown (comments)    Iron Sucrose Diarrhea    Statins-Hmg-Coa Reductase Inhibitors Other (comments)     Body ache       Lab/Data Review:  Recent Results (from the past 24 hour(s))   CBC WITH AUTOMATED DIFF    Collection Time: 11/22/21  5:27 AM   Result Value Ref Range    WBC 10.1 4.6 - 13.2 K/uL    RBC 3.02 (L) 4.20 - 5.30 M/uL    HGB 9.4 (L) 12.0 - 16.0 g/dL    HCT 30.5 (L) 35.0 - 45.0 %    .0 (H) 78.0 - 100.0 FL    MCH 31.1 24.0 - 34.0 PG    MCHC 30.8 (L) 31.0 - 37.0 g/dL    RDW 15.8 (H) 11.6 - 14.5 %    PLATELET 214 (H) 910 - 420 K/uL    MPV 9.7 9.2 - 11.8 FL    NRBC 0.0 0  WBC    ABSOLUTE NRBC 0.00 0.00 - 0.01 K/uL    NEUTROPHILS 51 40 - 73 %    LYMPHOCYTES 34 21 - 52 %    MONOCYTES 9 3 - 10 %    EOSINOPHILS 3 0 - 5 %    BASOPHILS 1 0 - 2 %    IMMATURE GRANULOCYTES 3 (H) 0.0 - 0.5 %    ABS. NEUTROPHILS 5.2 1.8 - 8.0 K/UL    ABS. LYMPHOCYTES 3.5 0.9 - 3.6 K/UL    ABS. MONOCYTES 0.9 0.05 - 1.2 K/UL    ABS. EOSINOPHILS 0.3 0.0 - 0.4 K/UL    ABS. BASOPHILS 0.1 0.0 - 0.1 K/UL    ABS. IMM.  GRANS. 0.3 (H) 0.00 - 0.04 K/UL    DF AUTOMATED     RENAL FUNCTION PANEL    Collection Time: 11/22/21  5:27 AM   Result Value Ref Range    Sodium 137 136 - 145 mmol/L    Potassium 5.4 3.5 - 5.5 mmol/L    Chloride 101 100 - 111 mmol/L    CO2 27 21 - 32 mmol/L    Anion gap 9 3.0 - 18 mmol/L    Glucose 106 (H) 74 - 99 mg/dL    BUN 58 (H) 7.0 - 18 MG/DL    Creatinine 7.52 (H) 0.6 - 1.3 MG/DL    BUN/Creatinine ratio 8 (L) 12 - 20      GFR est AA 6 (L) >60 ml/min/1.73m2    GFR est non-AA 5 (L) >60 ml/min/1.73m2    Calcium 9.1 8.5 - 10.1 MG/DL    Phosphorus 8.1 (H) 2.5 - 4.9 MG/DL    Albumin 3.6 3.4 - 5.0 g/dL   MAGNESIUM    Collection Time: 11/22/21  5:27 AM   Result Value Ref Range    Magnesium 2.4 1.6 - 2.6 mg/dL   TSH 3RD GENERATION    Collection Time: 11/22/21  5:27 AM   Result Value Ref Range    TSH 1.69 0.36 - 3.74 uIU/mL   T4, FREE    Collection Time: 11/22/21  5:27 AM   Result Value Ref Range    T4, Free 1.1 0.7 - 1.5 NG/DL   PTH INTACT    Collection Time: 11/22/21  5:28 AM   Result Value Ref Range    Calcium 9.4 8.5 - 10.1 MG/DL    PTH, Intact 374.7 (H) 18.4 - 88.0 pg/mL       Assessment:     Primary Rehabilitation Diagnosis  1.  Impaired Mobility and ADLs  2. Multiple closed pelvic fractures (comminuted fracture involving the L anterior acetabular wall w/ involvement of the base of the left superior pubic ramus w/o significant displacement)    Comorbidities  Patient Active Problem List   Diagnosis Code    Nausea & vomiting R11.2    Pyelonephritis O96    Complicated urinary tract infection N39.0    Anemia of chronic renal failure N18.9, D63.1    Anemia D64.9    Hypotension I95.9    UTI (urinary tract infection) N39.0    Type 2 diabetes mellitus, without long-term current use of insulin ([facility]) E11.9    CKD (chronic kidney disease) stage 5, GFR less than 15 ml/min ([facility]) N18.5    Acquired hypothyroidism E03.9    GERD (gastroesophageal reflux disease) S51.9    Complicated UTI (urinary tract infection) N39.0    Disease of the lower urinary tract N39.9    Sepsis ([facility]) A41.9    Tachycardia R00.0    ESRD (end stage renal disease) on dialysis ([facility]) N18.6, Z99.2    Hydronephrosis N13.30    Septic shock ([facility]) A41.9, R65.21    Afib ([facility]) I48.91    Atrial fibrillation ([facility]) I48.91    Inferior pubic ramus fracture, left, closed, initial encounter ([facility] S32.592A    Left acetabular fracture ([facility]) S32.402A    Closed fracture of left superior pubic ramus (HCC) S32.512A    Multiple closed pelvic fractures without disruption of pelvic ring ([facility] S32.82XA    Depression F32. A       Plan:     1. Justification for continued stay: Good progression towards established rehabilitation goals. 2. Medical Issues being followed closely:    [x]  Fall and safety precautions     []  Wound Care     [x]  Bowel and Bladder Function     [x]  Fluid Electrolyte and Nutrition Balance     [x]  Pain Control      3. Issues that 24 hour rehabilitation nursing is following:    [x]  Fall and safety precautions     []  Wound Care     [x]  Bowel and Bladder Function     [x]  Fluid Electrolyte and Nutrition Balance     [x]  Pain Control      [x]  Assistance with and education on in-room safety with transfers to and from the bed, wheelchair, toilet and shower. 4. Acute rehabilitation plan of care:    [x]  Continue current care and rehab. [x]  Physical Therapy           [x]  Occupational Therapy           []  Speech Therapy     []  Hold Rehab until further notice     5. Medications:    [x]  MAR Reviewed     [x]  Continue Present Medications     6. DVT Prophylaxis:      []  Enoxaparin     []  Unfractionated Heparin     []  Warfarin     [x]  NOAC     []  AMELIE Stockings     []  Sequential Compression Device     []  None     7. Code status    []  Full code     []  Partial code     []  Do not intubate     [x]  Do not resuscitate     8.  Orders:   > Multiple closed pelvic fractures (comminuted fracture involving the L anterior acetabular wall w/involvement of the base of the left superior pubic ramus w/o significant displacement)   > Orthopedic surgery recommending non-surgical management with PT, pain control   > Partial (50%) weightbearing on the left lower extremity     > Urinary tract infection, History of right ureteral stent   > Urine culture (collected 11/16/2021, resulted 11/20/2021) yielded growth of >100,000 colonies/ml of Proteus mirabilis RESISTANT to Nitrofurantoin   > Started on IV ceftriaxone and transitioned to cefpodoxime   > Follow up with urology on discharge from ARU   > Continue Cefpodoxime 200 mg PO q 24 hr (STOP DATE: 11/25/2021)     > History of VRE urinary tract infection, 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)   > Urine culture (collected 10/8/2021, resulted 10/14/2021) yielded growth of >100,000 colonies/ml of Enterococcus faecalis RESISTANT to Ciprofloxacin, Levofloxacin, Tetracycline and Vancomycin   > Contact isolation    > Paroxysmal atrial fibrillation, anticoagulated on Apixaban   > Anticoagulation    > Continue Apixaban 5 mg PO BID    > Rate-control    > Pt will let us know if feeling palpitations or heart racing    > Continue Amiodarone 200 mg PO once daily    > Glaucoma    > Continue Latanoprost 0.005% ophthalmic solution, 1 drop into each ete q PM    > Hypothyroidism     > 11/13/21 TSH 11.4, recheck with am labs   > Continue Levothyroxine 125 mcg PO once daily     > Chronic hypotension   > Continue Midodrine 10 mg PO TID with meals     > End-stage renal disease, on hemodialysis (Mon-Wed-Fri)   > Nephrology consult (Dr. Calli Phillips) called for evaluation of renal status and to arrange for hemodialysis while patient is in the ARU   > Continue:    > Ascorbic acid 500 mg PO once daily     > Cyanocobalamin 1,000 mcg PO once daily    > Vitamin B complex 1 tab PO once daily      > Type 2 diabetes mellitus, diet-controlled   > HbA1c (10/8/2021) = 4.6   > No need for blood glucose monitoring     > Depression   > Continue Duloxetine 20 mg PO once daily     > ? Allergy to albumin   > Will list as allergy per pt and daughter-in-law request.  Start antihistamine.   Monitor.    > Analgesia / Chronic low back pain   > Continue:    > Acetaminophen 650 mg PO TID (8AM, 12PM, 4PM)    > Acetaminophen 650 mg PO q 4 hr PRN for pain level 4/10 or lesser (from 8PM to 4AM only)    > Duloxetine 20 mg PO once daily    > Gabapentin 200 mg PO q Mon-Wed-Fri    > Lidocaine 4% patch, 1 patch on affected area q 24 hr (12 hr on, 12 hr off)    > Hydromorphone 1 mg PO q 8 hr PRN for pain level 5/10 or greater     > Diet:   > Specifications: 3 carb choices (45 gm/meal); Low fat/Low cholesterol/High fiber/IRINEO; Low potassium (less than 3,000 mg/day); Low phosphorus (less than 1,000 mg/day)   > Solids (consistency): Regular   > Liquids (consistency): Thin   > Fluid restriction: None       9. Personal Protective Equipment (N95 face mask and eye goggles) was used while interacting with the patient. Patient was using a surgical mask. 10. Patient's progress in rehabilitation and medical issues discussed with the patient. All questions answered to the best of my ability. Care plan discussed with patient and nurse. 11. Total clinical care time is 30 minutes, including review of chart including all labs, radiology, past medical history, and discussion with patient. Greater than 50% of my time was spent in coordination of care and counseling.       Signed:    Vilma Olivas MD    November 22, 2021

## 2021-11-22 NOTE — PROGRESS NOTES
conducted an initial consultation and Spiritual Assessment for Stevie Hubbard, who is a 64 y.o.,female. Patients Primary Language is: [place]. According to the patients EMR Bahai Affiliation is: Yazidi. The reason the Patient came to the hospital is:   Patient Active Problem List    Diagnosis Date Noted    Depression 11/21/2021    Multiple closed pelvic fractures without disruption of pelvic ring ([address] 11/19/2021    Left acetabular fracture ([address] 11/13/2021    Closed fracture of left superior pubic ramus (HCC) 11/13/2021    Afib ([address] 11/12/2021    Atrial fibrillation ([address] 11/12/2021    Inferior pubic ramus fracture, left, closed, initial encounter ([address] 11/12/2021    Septic shock ([address] 10/08/2021    Hydronephrosis 10/05/2021    ESRD (end stage renal disease) on dialysis ([address] 09/09/2021    Sepsis ([address] 06/18/2021    Tachycardia 06/18/2021    Disease of the lower urinary tract 03/02/2021    Nausea & vomiting 87/77/3284    Complicated UTI (urinary tract infection) 07/15/2020    Type 2 diabetes mellitus, without long-term current use of insulin ([address] 05/17/2020    CKD (chronic kidney disease) stage 5, GFR less than 15 ml/min ([address] 05/17/2020    Acquired hypothyroidism 05/17/2020    GERD (gastroesophageal reflux disease) 05/17/2020    Anemia 05/15/2020    Hypotension 05/15/2020    UTI (urinary tract infection) 05/15/2020    Anemia of chronic renal failure 03/19/2020    Pyelonephritis 77/92/5339    Complicated urinary tract infection 02/20/2020        The  provided the following Interventions:  Initiated a relationship of care and support with patient in room 178 of the rehab unit and offered pastoral care support to the patient. Listened empathically as she shared here story of being here and about her advance directive which took a couple of tries to get it completed she says. There is a Medical Power of  on file here.  Patient recovering from a broken Pelvis. Provided information about Spiritual Care Services. Offered prayer and assurance of continued prayers on patients behalf. The following outcomes were achieved:  Patient shared limited information about her medical narrative. Patient processed feeling about current hospitalization. Patient expressed gratitude for pastoral care visit. Assessment:  Patient does not have any Confucianism/cultural needs that will affect patients preferences in health care. There are no further spiritual or Confucianism issues which require Spiritual Care Services interventions at this time. Plan:  Chaplains will continue to follow and will provide pastoral care on an as needed/requested basis    . [facility]   Spiritual Care   [phone]

## 2021-11-22 NOTE — PROGRESS NOTES
Problem: Self Care Deficits Care Plan (Adult)  Goal: *Therapy Goal (Edit Goal, Insert Text)  Description: Long Term Goals (to be met upon discharge date) in order to increase pt's functional independence and safety, and decrease burden of care:  1. Pt will perform self-feeding with Mod Independent  2. Pt will perform grooming with Mod Independent  3. Pt will perform UB bathing with supervision  4. Pt will perform LB bathing with SBA  5. Pt will perform shower transfer with SBA  6. Pt will perform UB dressing with supervision  7. Pt will perform LB dressing with SBA  8. Pt will perform toileting task with SBA  9. Pt will perform toilet transfer with SBA  10. Pt will perform an IADL task while standing with SBA     Short Term Weekly Goals for (2021-2021) in order to increase pt's functional independence and safety, and decrease burden of care:  1. Pt will perform self-feeding with supervision  2. Pt will perform grooming with supervision  3. Pt will perform UB bathing with SBA  4. Pt will perform LB bathing with Min A  5. Pt will perform shower transfer with Min A  6. Pt will perform UB dressing with SBA  7. Pt will perform LB dressing with Min A  8. Pt will perform toileting task with Min A  9. Pt will perform toilet transfer with Min A  Occupational Therapy TREATMENT    Patient: Jonna Fatima   64 y.o. Patient identified with name and : Yes    Date: 2021    First Tx Session  Time In: 0730  Time Out[de-identified] 0900    Diagnosis: Multiple closed pelvic fractures without disruption of pelvic ring (HCC) [S32.82XA]   Precautions: Contact, TTWB, Fall, Skin  Chart, occupational therapy assessment, plan of care, and goals were reviewed. Pain:  Pt reports 7/10 pain or discomfort prior to treatment. Pt reports 3/10 pain or discomfort post treatment. Intervention Provided:N/A      SUBJECTIVE:   Patient stated Can I sit down during clothes management in standing.     OBJECTIVE DATA SUMMARY: THERAPEUTIC EXERCISE Daily Assessment    To increase strength for ADLs:  Pt propelled w/c from room to gym with S.  UB bike up to 10 minutes with moderate resistance. PER 5/10     GROOMING Daily Assessment    Grooming  Grooming Assistance : 5 (Supervision) (setup EOB). Oral care seated at sink with S.     UPPER BODY BATHING Daily Assessment    Upper Body Bathing  Bathing Assistance, Upper: 5 (Supervision) (setup EOB)  Position Performed: Seated edge of bed     LOWER BODY BATHING Daily Assessment    Lower Body Bathing  Bathing Assistance, Lower : 4 (Minimal assistance) (asst to balance in standing amd maintaing weightbearing rest)  Adaptive Equipment: Long handled sponge  Position Performed: Seated edge of bed; Standing     UPPER BODY DRESSING Daily Assessment    Upper Body Dressing   Dressing Assistance : 5 (Supervision) (setup EOB)     LOWER BODY DRESSING Daily Assessment    Lower Body Dressing   Dressing Assistance : 4 (Minimal assistance)  Position Performed: Seated edge of bed; Standing  Adaptive Equipment Used: Walker     MOBILITY/TRANSFERS Daily Assessment     Sit to stand to RW with Moda and SPT to w/c. Pt required asst for balance and maintaining weight restrictions on LLE. ASSESSMENT:  Pt working on increasing BUE strength and balance with RW for I in ADLs. Progression toward goals:  [x]          Improving appropriately and progressing toward goals  []          Improving slowly and progressing toward goals  []          Not making progress toward goals and plan of care will be adjusted     PLAN:  Patient continues to benefit from skilled intervention to address the above impairments. Continue treatment per established plan of care. Discharge Recommendations:  Home Health with 24 hr asst  Further Equipment Recommendations for Discharge:  N/A     Activity Tolerance:  Fair    Estimated LOS:    Please refer to the flowsheet for vital signs taken during this treatment.   After treatment:   [x] Patient left in no apparent distress sitting up in chair   [x]  Patient left in no apparent distress in bed  []  Call bell left within reach  []  Nursing notified  []  Caregiver present  []  Bed alarm activated    COMMUNICATION/EDUCATION:   [] Home safety education was provided and the patient/caregiver indicated understanding. [x] Patient/family have participated as able in goal setting and plan of care. [] Patient/family agree to work toward stated goals and plan of care. [] Patient understands intent and goals of therapy, but is neutral about his/her participation. [] Patient is unable to participate in goal setting and plan of care.       Abdifatah Single, OT   Outcome: Progressing Towards Goal

## 2021-11-22 NOTE — PROGRESS NOTES
SHIFT CHANGE NOTE FOR [facility]    Bedside and Verbal shift change report given to Russell Combs RN (oncoming nurse) by Chantale Giang RN (offgoing nurse). Report included the following information SBAR, Kardex, MAR and Recent Results. Situation:   Code Status: DNR   Hospital Day: 3   Problem List:   Hospital Problems  Date Reviewed: 9/21/2021          Codes Class Noted POA    Depression ICD-10-CM: F32. A  ICD-9-CM: 713  11/21/2021 Unknown        * (Principal) Multiple closed pelvic fractures without disruption of pelvic ring ([facility] [address] ICD-10-CM: N99.36KP  ICD-9-CM: 808.44  11/19/2021 Unknown        Atrial fibrillation (HCC) ICD-10-CM: I48.91  ICD-9-CM: 427.31  11/12/2021 Yes        Type 2 diabetes mellitus, without long-term current use of insulin (HCC) ICD-10-CM: E11.9  ICD-9-CM: 250.00  5/17/2020 Yes        Acquired hypothyroidism ICD-10-CM: E03.9  ICD-9-CM: 244.9  5/17/2020 Yes        Hypotension ICD-10-CM: I95.9  ICD-9-CM: 458.9  3/92/9696 Yes        Complicated urinary tract infection ICD-10-CM: N39.0  ICD-9-CM: 599.0  2/20/2020 Yes              Background:   Past Medical History:   Past Medical History:   Diagnosis Date    Acidosis     Anemia     Arteriovenous fistula (HCC)     Chronic kidney disease     on HD at [facility] on [address] on MWF. [phone]    Chronic pain     CKD (chronic kidney disease)     Diabetes ([address]     no meds now    ESRD (end stage renal disease) on dialysis ([address] 9/9/2021    HLD (hyperlipidemia)     HTN (hypertension)     Hyperparathyroidism due to renal insufficiency ([address]     Hypothyroid     Kidney stone     Lung mass     Recurrent UTI     Ureter, stricture     Uric acid nephrolithiasis     Urinary incontinence         Assessment:   Changes in Assessment throughout shift: No change to previous assessment     Patient has a central line: no Reasons if yes: Insertion date: Last dressing date:   Patient has Cline Cath: no Reasons if yes:     Insertion date:    Last Vitals:     Vitals:    11/21/21 2048 11/22/21 0740 11/22/21 1227 11/22/21 1231   BP: (!) 119/59 (!) 114/55 (!) 112/54    Pulse: 70 76  72   Resp: 18 14     Temp: 97 °F (36.1 °C) 97.1 °F (36.2 °C)     SpO2: 98% 96%     Height:            PAIN    Pain Assessment    Pain Intensity 1: 0 (11/22/21 1230) Pain Intensity 1: 2 (12/29/14 1105)    Pain Location 1: Hip, Leg Pain Location 1: Abdomen    Pain Intervention(s) 1: Medication (see MAR) Pain Intervention(s) 1: Medication (see MAR)  Patient Stated Pain Goal: 0 Patient Stated Pain Goal: 0  o Intervention effective: yes  o Other actions taken for pain: Medication (see MAR)     Skin Assessment  Skin color Skin Color: Appropriate for ethnicity  Condition/Temperature Skin Condition/Temp: Dry, Warm  Integrity Skin Integrity: Intact  Turgor Turgor: Non-tenting  Weekly Pressure Ulcer Documentation  Pressure  Injury Documentation: No Pressure Injury Noted-Pressure Ulcer Prevention Initiated  Wound Prevention & Protection Methods  Orientation of wound Orientation of Wound Prevention: Posterior  Location of Prevention Location of Wound Prevention: Sacrum/Coccyx  Dressing Present Dressing Present : No  Dressing Status    Wound Offloading Wound Offloading (Prevention Methods): Bed, pressure redistribution/air, Chair cushion, Pillows, Repositioning     INTAKE/OUPUT  Date 11/21/21 0700 - 11/22/21 0659 11/22/21 0700 - 11/23/21 0659   Shift 4521-9980 1506-5883 24 Hour Total 5154-9817 5555-8842 24 Hour Total   INTAKE   P.O. 660  660 320  320     P. O. 660  660 320  320   Shift Total 660  660 320  320   OUTPUT   Urine           Urine Occurrence(s) 0 x 0 x 0 x      Stool           Stool Occurrence(s) 1 x 1 x 2 x      Shift Total           660 320  320   Weight (kg)             Recommendations:  1. Patient needs and requests: TOILETING    2. Pending tests/procedures: LABS PENDING     3.  Functional Level/Equipment: Partial (one person) /      Fall Precautions:   Fall risk precautions were reinforced with the patient; she was instructed to call for help prior to getting up. The following fall risk precautions were continued: bed/ chair alarms, door signage, yellow bracelet and socks as well as update of the Jeffrey Martines tool in the patient's room. Freddy Score: 4    HEALS Safety Check    A safety check occurred in the patient's room between off going nurse and oncoming nurse listed above. The safety check included the below items  Area Items   H  High Alert Medications - Verify all high alert medication drips (heparin, PCA, etc.)   E  Equipment - Suction is set up for ALL patients (with adelaide)  - Red plugs utilized for all equipment (IV pumps, etc.)  - WOWs wiped down at end of shift.  - Room stocked with oxygen, suction, and other unit-specific supplies   A  Alarms - Bed alarm is set for fall risk patients  - Ensure chair alarm is in place and activated if patient is up in a chair   L  Lines - Check IV for any infiltration  - Cline bag is empty if patient has a Cline   - Tubing and IV bags are labeled   S  Safety   - Room is clean, patient is clean, and equipment is clean. - Hallways are clear from equipment besides carts. - Fall bracelet on for fall risk patients  - Ensure room is clear and free of clutter  - Suction is set up for ALL patients (with adelaide)  - Hallways are clear from equipment besides carts.    - Isolation precautions followed, supplies available outside room, sign posted     Fadi Cedeno RN

## 2021-11-22 NOTE — PROGRESS NOTES
[facility]    Bedside and Verbal shift change report given to [address] (oncoming nurse) by Varinder Alejo LPN (offgoing nurse). Report included the following information SBAR, Kardex, MAR and Recent Results. Situation:   Code Status: DNR   Hospital Day: 2   Problem List:   Hospital Problems  Date Reviewed: 9/21/2021          Codes Class Noted POA    Depression ICD-10-CM: F32. A  ICD-9-CM: 861  11/21/2021 Unknown        * (Principal) Multiple closed pelvic fractures without disruption of pelvic ring ([facility] [address] ICD-10-CM: H66.91FV  ICD-9-CM: 808.44  11/19/2021 Unknown        Atrial fibrillation (HCC) ICD-10-CM: I48.91  ICD-9-CM: 427.31  11/12/2021 Yes        Type 2 diabetes mellitus, without long-term current use of insulin (HCC) ICD-10-CM: E11.9  ICD-9-CM: 250.00  5/17/2020 Yes        Acquired hypothyroidism ICD-10-CM: E03.9  ICD-9-CM: 244.9  5/17/2020 Yes        Hypotension ICD-10-CM: I95.9  ICD-9-CM: 458.9  3/60/0580 Yes        Complicated urinary tract infection ICD-10-CM: N39.0  ICD-9-CM: 599.0  2/20/2020 Yes              Background:   Past Medical History:   Past Medical History:   Diagnosis Date    Acidosis     Anemia     Arteriovenous fistula (HCC)     Chronic kidney disease     on HD at [address] on MWF. [phone]    Chronic pain     CKD (chronic kidney disease)     Diabetes ([facility] [address]     no meds now    ESRD (end stage renal disease) on dialysis ([facility] [address] 9/9/2021    HLD (hyperlipidemia)     HTN (hypertension)     Hyperparathyroidism due to renal insufficiency ([facility] [address]     Hypothyroid     Kidney stone     Lung mass     Recurrent UTI     Ureter, stricture     Uric acid nephrolithiasis     Urinary incontinence         Assessment:   Changes in Assessment throughout shift: No change to previous assessment     Patient has a central line: no Reasons if yes: Insertion date: Last dressing date:   Patient has Cline Cath: no Reasons if yes:     Insertion date:    Last Vitals:     Vitals:    11/21/21 0730 11/21/21 1224 11/21/21 1554 11/21/21 2048   BP: 108/62 124/65 (!) 107/57 (!) 119/59   Pulse: 80 80 75 70   Resp: 12 14 18   Temp: 97.1 °F (36.2 °C)  97.7 °F (36.5 °C) 97 °F (36.1 °C)   SpO2: 95%  96% 98%   Height:            PAIN    Pain Assessment    Pain Intensity 1: 4 (11/21/21 2130) Pain Intensity 1: 2 (12/29/14 1105)    Pain Location 1: Hip, Leg Pain Location 1: Abdomen    Pain Intervention(s) 1: Medication (see MAR) Pain Intervention(s) 1: Medication (see MAR)  Patient Stated Pain Goal: 0 Patient Stated Pain Goal: 0  o Intervention effective: yes  o Other actions taken for pain: Medication (see MAR)     Skin Assessment  Skin color Skin Color: Appropriate for ethnicity  Condition/Temperature Skin Condition/Temp: Dry, Warm  Integrity Skin Integrity: Intact  Turgor Turgor: Non-tenting  Weekly Pressure Ulcer Documentation  Pressure  Injury Documentation: No Pressure Injury Noted-Pressure Ulcer Prevention Initiated  Wound Prevention & Protection Methods  Orientation of wound Orientation of Wound Prevention: Posterior  Location of Prevention Location of Wound Prevention: Buttocks, Sacrum/Coccyx  Dressing Present Dressing Present : No  Dressing Status    Wound Offloading Wound Offloading (Prevention Methods): Bed, pressure redistribution/air, Chair cushion, Pillows, Repositioning     INTAKE/OUPUT  Date 11/20/21 1900 - 11/21/21 0659 11/21/21 0700 - 11/22/21 0659   Shift 5492-7735 24 Hour Total 1264-6792 3631-9648 24 Hour Total   INTAKE   P.O. 120 360 660  660     P. O. 120 360 660  660   Shift Total 120 360 660  660   OUTPUT   Urine          Urine Occurrence(s) 0 x 0 x 0 x 0 x 0 x   Stool          Stool Occurrence(s) 0 x 0 x 1 x 0 x 1 x   Shift Total         360 660  660   Weight (kg)            Recommendations:  1. Patient needs and requests: TOILETING    2. Pending tests/procedures: LABS PENDING     3.  Functional Level/Equipment:   /      Fall Precautions:   Fall risk precautions were reinforced with the patient; she was instructed to call for help prior to getting up. The following fall risk precautions were continued: bed/ chair alarms, door signage, yellow bracelet and socks as well as update of the Castro Reason tool in the patient's room. Freddy Score:      HEALS Safety Check    A safety check occurred in the patient's room between off going nurse and oncoming nurse listed above. The safety check included the below items  Area Items   H  High Alert Medications - Verify all high alert medication drips (heparin, PCA, etc.)   E  Equipment - Suction is set up for ALL patients (with adelaide)  - Red plugs utilized for all equipment (IV pumps, etc.)  - WOWs wiped down at end of shift.  - Room stocked with oxygen, suction, and other unit-specific supplies   A  Alarms - Bed alarm is set for fall risk patients  - Ensure chair alarm is in place and activated if patient is up in a chair   L  Lines - Check IV for any infiltration  - Cline bag is empty if patient has a Cline   - Tubing and IV bags are labeled   S  Safety   - Room is clean, patient is clean, and equipment is clean. - Hallways are clear from equipment besides carts. - Fall bracelet on for fall risk patients  - Ensure room is clear and free of clutter  - Suction is set up for ALL patients (with adelaide)  - Hallways are clear from equipment besides carts.    - Isolation precautions followed, supplies available outside room, sign posted     Pérez Holt LPN

## 2021-11-22 NOTE — PROGRESS NOTES
RENAL DAILY PROGRESS NOTE              Subjective:       Complaint:   Seen in rehab ,no complaints    IMPRESSION:   IMPRESSION:   · esrd mwf dialysis  · Pelvic fx,non displaced  · A fib  · Chronic hypotension,on midodrine  · Secondary hyperparathyroidism  · Anemia of chronic disease  · ? Headaches related to albumin infusion      PLAN:   Seen during dialysis at 4 pm,bp 102/60,stable  D/c  albumin   Decrease allopurinol  Avoid Gadolinium due to its association with nephrogenic systemic fibrosis in a patients with severe ARF and ESRD.    Please dose all medications for creatinine clearance <15/dialysis.    Avoid blood pressure checks, blood draws, peripheral iv's on arm with access. AvoidPICC lines on either arm in order to preserve veins for dialysis access creation.               Current Facility-Administered Medications   Medication Dose Route Frequency    allopurinoL (ZYLOPRIM) tablet 100 mg  100 mg Oral Q MON, WED & FRI    epoetin caitlin-epbx (RETACRIT) injection 8,000 Units  8,000 Units SubCUTAneous Q MON, WED & FRI    cetirizine (ZYRTEC) tablet 10 mg  10 mg Oral DAILY    polyethylene glycol (MIRALAX) packet 17 g  17 g Oral DAILY    acetaminophen (TYLENOL) tablet 650 mg  650 mg Oral Q4H PRN    bisacodyL (DULCOLAX) tablet 10 mg  10 mg Oral Q48H PRN    amiodarone (CORDARONE) tablet 200 mg  200 mg Oral DAILY    cefpodoxime (VANTIN) tablet 200 mg  200 mg Oral Q24H    midodrine (PROAMATINE) tablet 10 mg  10 mg Oral TID WITH MEALS    acetaminophen (TYLENOL) tablet 650 mg  650 mg Oral TID    apixaban (ELIQUIS) tablet 5 mg  5 mg Oral BID    HYDROmorphone (DILAUDID) tablet 1 mg  1 mg Oral Q8H PRN    meclizine (ANTIVERT) tablet 12.5 mg  12.5 mg Oral TID PRN    DULoxetine (CYMBALTA) capsule 20 mg  20 mg Oral DAILY    vitamin B complex-folic acid 0.4 mg tablet  1 Tablet Oral DAILY    cyanocobalamin tablet 1,000 mcg  1,000 mcg Oral DAILY    L. acidophilus,casei,rhamnosus (BIO-K PLUS) capsule 1 Capsule  1 Capsule Oral DAILY    ascorbic acid (vitamin C) (VITAMIN C) tablet 500 mg  500 mg Oral DAILY    cholecalciferol (VITAMIN D3) (1000 Units /25 mcg) tablet 2,000 Units  2,000 Units Oral BID    latanoprost (XALATAN) 0.005 % ophthalmic solution 1 Drop  1 Drop Both Eyes QPM    levothyroxine (SYNTHROID) tablet 125 mcg  125 mcg Oral 6am    pantoprazole (PROTONIX) tablet 20 mg  20 mg Oral ACB    ondansetron (ZOFRAN ODT) tablet 4 mg  4 mg Oral TID PRN    lidocaine 4 % patch 1 Patch  1 Patch TransDERmal Q24H    gabapentin (NEURONTIN) capsule 200 mg  200 mg Oral Q MON, WED & FRI    doxercalciferoL (HECTOROL) 4 mcg/2 mL injection 4 mcg  4 mcg IntraVENous DIALYSIS MON, WED & FRI       Review of Symptoms: comprehensive ROS negative except above.    Objective:     Patient Vitals for the past 24 hrs:   Temp Pulse Resp BP SpO2   11/22/21 1600 -- (!) 59 -- (!) 102/59 --   11/22/21 1545 -- 68 -- (!) 116/59 --   11/22/21 1530 -- 71 -- 124/74 --   11/22/21 1515 -- 63 -- (!) 100/57 --   11/22/21 1500 -- 63 -- 105/85 --   11/22/21 1445 -- (!) 58 -- 114/66 --   11/22/21 1430 -- 68 -- (!) 109/50 --   11/22/21 1415 -- 65 -- 101/60 --   11/22/21 1400 -- (!) 59 -- (!) 108/38 --   11/22/21 1345 -- 67 -- 108/65 --   11/22/21 1330 -- 63 -- (!) 113/56 --   11/22/21 1316 -- 62 -- (!) 108/59 --   11/22/21 1310 97.7 °F (36.5 °C) 64 18 115/60 --   11/22/21 1231 -- 72 -- -- --   11/22/21 1227 -- -- -- (!) 112/54 --   11/22/21 0740 97.1 °F (36.2 °C) 76 14 (!) 114/55 96 %   11/21/21 2048 97 °F (36.1 °C) 70 18 (!) 119/59 98 %        Weight change:      11/20 1901 - 11/22 0700  In: 780 [P.O.:780]  Out: -     Intake/Output Summary (Last 24 hours) at 11/22/2021 1611  Last data filed at 11/22/2021 1319  Gross per 24 hour   Intake 560 ml   Output --   Net 560 ml     Physical Exam:   General: comfortable, no acute distress   HEENT sclera anicteric, supple neck, no thyromegaly  CVS: S1S2 heard,  no rub  RS: + air entry b/l,   Abd: Soft, Non tender, Not distended, Positive bowel sounds, no organomegaly, no CVA / supra pubic tenderness  Extrm: plus 1 edema, no cyanosis, clubbing   Skin: no visible  Rash  Musculoskeletal: No gross joints or bone deformities         Data Review:     LABS:   Hematology:   Recent Labs     11/22/21 0527   WBC 10.1   HGB 9.4*   HCT 30.5*     Chemistry:   Recent Labs     11/22/21 0528 11/22/21 0527   BUN  --  58*   CREA  --  7.52*   CA 9.4 9.1   ALB  --  3.6   K  --  5.4   NA  --  137   CL  --  101   CO2  --  27   PHOS  --  8.1*   GLU  --  106*            Procedures/imaging: see electronic medical records for all procedures, Xrays and details which were not copied into this note but were reviewed prior to creation of Plan          Assessment & Plan:       See above      Amina Santos MD  11/22/2021

## 2021-11-22 NOTE — REHAB NOTE
[facility] PHYSICAL REHABILITATION  [address] [place] [address]  OVERALL PLAN OF CARE    Name: Leeann Valentine CSN: [id number]   Age: 64 y.o. MRN: [id number]   Sex: female Admit Date: 11/19/2021     Primary Rehabilitation Diagnosis  1. Impaired Mobility and ADLs  2. Multiple closed pelvic fractures (comminuted fracture involving the L anterior acetabular wall w/ involvement of the base of the left superior pubic ramus w/o significant displacement)    Comorbidities  Patient Active Problem List   Diagnosis Code    Nausea & vomiting R11.2    Pyelonephritis V51    Complicated urinary tract infection N39.0    Anemia of chronic renal failure N18.9, D63.1    Anemia D64.9    Hypotension I95.9    UTI (urinary tract infection) N39.0    Type 2 diabetes mellitus, without long-term current use of insulin ([facility]) E11.9    CKD (chronic kidney disease) stage 5, GFR less than 15 ml/min ([facility]) N18.5    Acquired hypothyroidism E03.9    GERD (gastroesophageal reflux disease) R39.7    Complicated UTI (urinary tract infection) N39.0    Disease of the lower urinary tract N39.9    Sepsis ([facility]) A41.9    Tachycardia R00.0    ESRD (end stage renal disease) on dialysis ([facility]) N18.6, Z99.2    Hydronephrosis N13.30    Septic shock ([facility]) A41.9, R65.21    Afib ([facility]) I48.91    Atrial fibrillation ([facility]) I48.91    Inferior pubic ramus fracture, left, closed, initial encounter ([facility] [address] S32.592A    Left acetabular fracture ([facility]) S32.402A    Closed fracture of left superior pubic ramus ([facility]) S32.512A    Multiple closed pelvic fractures without disruption of pelvic ring ([facility] [address] S32.82XA    Depression F32. A         ANTICIPATED INTERVENTIONS THAT SUPPORT THE MEDICAL NECESSITY OF THIS ADMISSION:    I. Physical Therapy              A. Intensity: 1.5 hour per day              B. Frequency: 5 times per week              C. Duration: 4 weeks              D. Long Term Goals:    1.   Patient will move from supine to sit and sit to supine , scoot up and down, and roll side to side in bed with modified independence. 2.  Patient will transfer from bed to chair and chair to bed with modified independence using the least restrictive device. 3.  Patient will perform sit to stand with supervision/set-up. 4.  Patient will perform gait training if and when appropriate based on ability to consistently maintain TTWB left LE. 5.  Patient will perform w/c mobility mod I over even surfaces for at least 200 ft    6. Patient will negotiate w/c ramp with distant supervision. E. Interventions: Interventions may include range of motion (AROM, PROM B LE/trunk), motor function (B LE/trunk strengthening/coordination), activity tolerance (vitals, oxygen saturation levels), bed mobility training, balance activities, gait training (progressive ambulation program), wheelchair management and functional transfer training. II. Occupational Therapy              A. Intensity: 1.5 hour per day              B. Frequency: 5 times per week              C. Duration: 4 weeks              D. Long Term Goals:    1. Pt will perform self-feeding with Mod Independent    2. Pt will perform grooming with Mod Independent    3. Pt will perform UB bathing with supervision    4. Pt will perform LB bathing with SBA    5. Pt will perform shower transfer with SBA    6. Pt will perform UB dressing with supervision    7. Pt will perform LB dressing with SBA    8. Pt will perform toileting task with SBA    9. Pt will perform toilet transfer with SBA    10. Pt will perform an IADL task while standing with SBA   E. Interventions: Interventions may include range of motion (AROM, PROM B UE), motor function (B UE/ strengthening/coordination), activity tolerance (vitals, oxygen saturation levels), balance training, ADL/IADL training and functional transfer training.       PHYSICIAN'S ASSESSMENT OF FINDINGS:    Are the established goals sufficient for achieving the optimal level of function? [x]  Yes      []  No    What changes would you recommend to the goals as written? None      Are the interventions noted sufficient for achieving the optimal level of function? [x]  Yes      []  No    What changes would you recommend to the interventions noted? If therapy staff is unable to provide 3 hr of total therapy per day in 5 days due to medical issues or decreased patient tolerance, may modify treatment schedule to 15 hr/week.       Estimated length of stay: 2-3 weeks      Medical rehabilitation prognosis:    []  Excellent     [x]  Good     []  Fair     []  Guarded       Discharge Destination:     [x]  Home     []  [address]     []  [facility]     []  [address]     []  [place]     []  Other:       Signed:    Mariel Grant MD    November 21, 2021

## 2021-11-23 PROBLEM — Z41.8 NEED FOR PROPHYLACTIC ISOLATION: Status: ACTIVE | Noted: 2021-10-08

## 2021-11-23 PROBLEM — I95.89 CHRONIC HYPOTENSION: Status: ACTIVE | Noted: 2020-05-15

## 2021-11-23 PROBLEM — S32.512D: Status: ACTIVE | Noted: 2021-11-12

## 2021-11-23 PROBLEM — Z87.448 HISTORY OF HYDRONEPHROSIS: Status: ACTIVE | Noted: 2021-10-05

## 2021-11-23 PROBLEM — Z87.440 HISTORY OF URINARY TRACT INFECTION: Status: ACTIVE | Noted: 2021-10-08

## 2021-11-23 PROBLEM — Z86.19 HISTORY OF SEPSIS: Status: ACTIVE | Noted: 2021-06-18

## 2021-11-23 PROBLEM — S32.415D: Status: ACTIVE | Noted: 2021-11-12

## 2021-11-23 PROBLEM — S32.592D: Status: ACTIVE | Noted: 2021-11-12

## 2021-11-23 PROBLEM — Z86.19 HISTORY OF INFECTION WITH VANCOMYCIN RESISTANT ENTEROCOCCUS (VRE): Status: ACTIVE | Noted: 2021-10-08

## 2021-11-23 PROBLEM — Z86.19 HISTORY OF SEPTIC SHOCK: Status: ACTIVE | Noted: 2021-10-08

## 2021-11-23 PROBLEM — Z87.448 HISTORY OF ACUTE PYELONEPHRITIS: Status: ACTIVE | Noted: 2020-02-20

## 2021-11-23 PROCEDURE — 99232 SBSQ HOSP IP/OBS MODERATE 35: CPT | Performed by: INTERNAL MEDICINE

## 2021-11-23 PROCEDURE — 97530 THERAPEUTIC ACTIVITIES: CPT

## 2021-11-23 PROCEDURE — 74011250637 HC RX REV CODE- 250/637: Performed by: INTERNAL MEDICINE

## 2021-11-23 PROCEDURE — 97116 GAIT TRAINING THERAPY: CPT

## 2021-11-23 PROCEDURE — 97110 THERAPEUTIC EXERCISES: CPT

## 2021-11-23 PROCEDURE — 74011000250 HC RX REV CODE- 250: Performed by: INTERNAL MEDICINE

## 2021-11-23 PROCEDURE — 65310000000 HC RM PRIVATE REHAB

## 2021-11-23 PROCEDURE — 74011250637 HC RX REV CODE- 250/637: Performed by: FAMILY MEDICINE

## 2021-11-23 RX ORDER — MIDODRINE HYDROCHLORIDE 5 MG/1
5 TABLET ORAL
Status: DISCONTINUED | OUTPATIENT
Start: 2021-11-23 | End: 2021-12-04 | Stop reason: HOSPADM

## 2021-11-23 RX ADMIN — PANTOPRAZOLE SODIUM 20 MG: 20 TABLET, DELAYED RELEASE ORAL at 06:36

## 2021-11-23 RX ADMIN — Medication 500 MG: at 08:35

## 2021-11-23 RX ADMIN — CETIRIZINE HYDROCHLORIDE 10 MG: 10 TABLET, FILM COATED ORAL at 08:36

## 2021-11-23 RX ADMIN — APIXABAN 5 MG: 5 TABLET, FILM COATED ORAL at 17:06

## 2021-11-23 RX ADMIN — ACETAMINOPHEN 650 MG: 325 TABLET ORAL at 17:06

## 2021-11-23 RX ADMIN — MIDODRINE HYDROCHLORIDE 5 MG: 5 TABLET ORAL at 17:07

## 2021-11-23 RX ADMIN — CEFPODOXIME PROXETIL 200 MG: 200 TABLET, FILM COATED ORAL at 18:13

## 2021-11-23 RX ADMIN — CHOLECALCIFEROL TAB 25 MCG (1000 UNIT) 2000 UNITS: 25 TAB at 17:06

## 2021-11-23 RX ADMIN — CYANOCOBALAMIN TAB 1000 MCG 1000 MCG: 1000 TAB at 08:35

## 2021-11-23 RX ADMIN — LATANOPROST 1 DROP: 50 SOLUTION OPHTHALMIC at 21:00

## 2021-11-23 RX ADMIN — Medication 1 CAPSULE: at 09:41

## 2021-11-23 RX ADMIN — LEVOTHYROXINE SODIUM 125 MCG: 125 TABLET ORAL at 06:11

## 2021-11-23 RX ADMIN — AMIODARONE HYDROCHLORIDE 200 MG: 200 TABLET ORAL at 08:35

## 2021-11-23 RX ADMIN — Medication 1 TABLET: at 08:36

## 2021-11-23 RX ADMIN — CHOLECALCIFEROL TAB 25 MCG (1000 UNIT) 2000 UNITS: 25 TAB at 08:35

## 2021-11-23 RX ADMIN — ACETAMINOPHEN 650 MG: 325 TABLET ORAL at 08:35

## 2021-11-23 RX ADMIN — DULOXETINE HYDROCHLORIDE 20 MG: 20 CAPSULE, DELAYED RELEASE ORAL at 08:36

## 2021-11-23 RX ADMIN — HYDROMORPHONE HYDROCHLORIDE 1 MG: 2 TABLET ORAL at 22:01

## 2021-11-23 RX ADMIN — HYDROMORPHONE HYDROCHLORIDE 1 MG: 2 TABLET ORAL at 11:10

## 2021-11-23 RX ADMIN — APIXABAN 5 MG: 5 TABLET, FILM COATED ORAL at 08:35

## 2021-11-23 RX ADMIN — ACETAMINOPHEN 650 MG: 325 TABLET ORAL at 12:12

## 2021-11-23 NOTE — INTERDISCIPLINARY ROUNDS
[facility] PHYSICAL REHABILITATION  [address] [place] [address], [address]    INPATIENT REHABILITATION  TEAM CONFERENCE SUMMARY     Date of Conference: 11/23/2021    Patient Information:        Name: Aashish Dang Age / Sex: 64 y.o. / female   CSN: [id number] MRN: [id number]   Admit Date: 11/19/2021 Length of Stay: 4 days     Primary Rehabilitation Diagnosis  1. Impaired Mobility and ADLs  2. 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 ([facility]) E11.22, N18.6    Secondary hyperparathyroidism of renal origin ([facility] [address] N25.81    Gastroesophageal reflux disease K21.9    History of recurrent urinary tract infection Z87.440    History of sepsis Z86.19    End-stage renal disease on hemodialysis ([facility]) N18.6, Z99.2    History of hydronephrosis Z87.448    History of septic shock Z86.19    Anticoagulated by anticoagulation treatment Z79.01    Paroxysmal atrial fibrillation ([facility]) I48.0    Closed fracture of left inferior pubic ramus, with routine healing, subsequent encounter S32.592D    Closed nondisplaced fracture of anterior wall of left acetabulum with routine healing S32.415D    Closed fracture of superior pubic ramus, left, with routine healing, subsequent encounter S32.512D    Multiple closed pelvic fractures without disruption of pelvic ring ([address] S32.82XA    Depression F32. A    History of urinary tract infection Z87.440    Need for prophylactic isolation Z41.8    Hyperlipidemia E78.5    Hypothyroidism E03.9    History of kidney stones Z87.442    Chronic pain G89.29    Lung mass R91.8    History of urethral stricture Z87.448    Uric acid nephrolithiasis N20.0    Urinary incontinence R32    Glaucoma H40.9          Therapy:     FIM SCORES Initial Assessment Weekly Progress Assessment 11/23/2021   Eating Functional Level: 5  Comments: setup on bedside table  5   Swallowing     Grooming 5     Bathing 3        Upper Body Dressing Functional Level: 4  Items Applied/Steps Completed: Pullover (4 steps)  Comments: set up while seated on EOB     Lower Body Dressing Functional Level: 3  Items Applied/Steps Completed: Shoe, left (1 step), Shoe, right (1 step), Sock, left (1 step), Sock, right (1 step), Elastic waist pants (3 steps)  Comments: min vc to direction for safety to stand & pull pants over hips     Toileting Functional Level: 3  Comments: clothing management wearing scrubs due to not having her clothes here yet  3   Bladder 1 0   Bowel  1 1   Toilet Transfer Hopkins Toilet Transfer Score: 4  Comments: rw  4   Tub/Shower Transfer Hopkins Tub/Shower Transfer Score: 4  Comments: uses rw & TTWB        Comprehension Primary Mode of Comprehension: Auditory  Score: 5 Auditory  5   Expression Primary Mode of Expression: Verbal  Score: 5  Comments: expresses her needs appropriately Verbal  5   Social Interaction Score: 4  Comments: polite & nice 4   Problem Solving Score: 4  Comments: she knew to don her shoes so not to slide on the floor with her socks on before we used RW to go from her bed to chair 4   Memory Score: 4  Comments: appears WNL with sharing her medical hx & family dynamics 4     FIM SCORES Initial Assessment Weekly Progress Assessment 11/23/2021   Bed/Chair/Wheelchair Transfers Transfer Type: SPT with walker (mod/max A due to loss of balance and needing recovery)  Other: lateral transfer with transfer board (needing mod/max A)  Transfer Assistance :  (mod/max A for stabilization and for maintaining TTWB left LE)  Sit to Stand Assistance: Moderate assistance (lifting assistance, support left LE to ensure TTWB)  Car Transfers: Not tested  Car Type: NT Transfer Type:  Other  Other: stand step with RW  Transfer Assistance : 4 (Minimal assistance)  Sit to Stand Assistance: Minimal assistance   Bed Mobility Rolling Right 4 (Minimal assistance)   Rolling Left 4 (Minimal assistance)   Supine to Sit 3 (Moderate assistance) (flat head of bed, no railings, needing minimal trunk support)   Sit to Stand Moderate assistance (lifting assistance, support left LE to ensure TTWB)   Sit to Supine 3 (Moderate assistance) (head of bed flat, no rails, assist w/ left LE and reposition)    Rolling Right       Rolling Left       Supine to Sit       Sit to Stand   Minimal assistance   Sit to Supine          Locomotion (W/C) Able to Propel (ft): 230 feet  Functional Level: 4  Curbs/Ramps Assist Required (FIM Score): 0 (Not tested)  Wheelchair Setup Assist Required : 2 (Maximal assistance)  Wheelchair Management: Manages left brake, Manages right brake (assistance with armrests, footrests) Able to propel: 210 feet  Function level 5  Curbs/ramps assist required: 0 (not tested)  Wheelchaiir set up assist required: 2 (maximum assistance)    Wheelchair management: manages right and left brakes      Locomotion (W/C distance)   232 feet   Locomotion (Walk) 0 (Not tested) 4 (Minimal assistance)  Walker, rolling   Locomotion (Walk dist.) 0 Feet (ft) 61 Feet (ft)   Steps/Stairs Steps/Stairs Ambulated (#): 0  Level of Assist : 0 (Not tested)  Rail Use:  (NT)  Steps/stairs ambulated (#): 0  Level of assistance: 0 (not tested)           Nursing:     Neuro:   AAA&O x    4        Respiratory:   [x] WNL   [] O2 LPM:   Other:  Peripheral Vascular:   [] TEDS present   [x] Edema present ____ Grade   Cardiac:   [x] WNL   [] OtherGenitourinary:   [x] continent   [] incontinent   [] chua  Abdominal ____11/23/2021___ LBM  GI: ______11/23/2021_ Diet ____regular__ Liquids __thin___ tube feeds  Musculoskeletal: ____ ROM Transfers _____ Assistive Device Used  ___min_ Level of Assistance  Skin Integumentary:   [x] Intact   [] Not Intact   __________Preventative Measures  Details______________________________________________________________  Pain: [x] Controlled   [] Not Controlled   Pain Meds:   [] Scheduled   [] PRN        Registered Dietitian / Nutrition:   No data found. Pt seen this morning. She reported that her appetite was not good yesterday, but is feeling better today and appetite has improved. Had fair po intake yesterday; good intake today for breakfast, eating 85% of meal. She reported receiving eggs this morning, of which she has previously stated that she does not like. Preference has already been updated in diet order; this writer discussed report with Foodservice. Pt also reported an additional preference, which this writer will add in diet order; discussed with Foodservice. Supplements:          [x] Yes: Magic cup TID    [] No      Amount of supplement consumed:  100%      Intake/Output Summary (Last 24 hours) at 11/23/2021 1655  Last data filed at 11/23/2021 1253  Gross per 24 hour   Intake 240 ml   Output --   Net 240 ml                                Last bowel movement: 11/22      Interdisciplinary Team Goals:     1. Discipline  Physical Therapy    Goal  Pt will perform bed mobility including rolling from side to side, scooting up/down bed, supine<>sit with minimal assistance and increased safety awareness. Barrier  patient with decreased strength, mobility, transfers, ambulation and safety awareness. Intervention  Patient to participate in strength, ambulation, transfers and safety awareness training. Goal written by:   JOSE Barrientos     2.  Discipline  Occupational Therapy    Goal  Pt will perform LB bathing/dressing with S using RW following weight bearing precautions    Barrier  Decreased BUE strength, standing balance, and endurance     Intervention Therex, Theract, Self Care Retraining, Education    Goal written by:  Preston Yee, MSOTR/L      3. Discipline  Speech Therapy    Goal      Barrier      Intervention      Goal written by:       4. Discipline  Nursing    Goal  Patient will request pain medication at onset of pain before intensity increases    Barrier  opoid overuse,chronic illness    Intervention  assessing and documenting pain,pain management programs    Goal written by:  Keara Kerns LPN     5. Discipline  Clinical Psychology    Goal  Maintain mood and behavior stability in treatment effort on ARU    Barrier  Stress with immediate medical circumstances and other, chronic medical problems    Intervention  Support , as needed and Rx    Goal written by:  Nahun Arevalo, PhD     6. Discipline  Nutrition / Dietetics    Goal  - PO nutrition intake will meet >75% of patient estimated nutritional needs within the next 7 days. Barrier  appetite, food preferences    Intervention  continue po diet and nutrition supplements. Update food preference in diet order. Encourage/ monitor po intake of meals and supplements. Pt discussed with Foodservice. Goal written by:  Damian Dee RD       Disposition / Discharge Planning:      Follow-up services:  [x] Physical Therapy             [x] Occupational Therapy       [] Speech Therapy           [] Skilled Nursing      [] Medical Social Worker   [] Aide        [] Outpatient      [] vs   [x] Home Health  [] vs       [] to progress to outpatient       [] with 24-hour supervision       [x] with 24-hour assistance   [] Marc ESTRADA recommendations:  bedside commode   Estimated discharge date:  12/17/2021   Discharge Location:  [x] Home  [] versus    [] Marc White    [] [facility] [address]   [] Other:           Interdisciplinary team rounds were held this PM with the following team members:       Name   Physical Therapist    Laura Johnson PT, DPT   Occupational Therapist    Rashmi Coyne OTR/L   Recreational Therapist    Beryle Dresser, CTRS   Nursing    Michael Gerardo, JOSE ANTONIO      Physician    Jai Toure MD        Shelle Kussmaul, MSW          Signed:  Jai Toure MD    November 23, 2021

## 2021-11-23 NOTE — PROGRESS NOTES
SHIFT CHANGE NOTE FOR [facility]    Bedside and Verbal shift change report given to MILLER SAM (oncoming nurse) by Cara Cunha RN (offgoing nurse). Report included the following information SBAR, Kardex, MAR and Recent Results. Situation:   Code Status: DNR   Hospital Day: 4   Problem List:   Hospital Problems  Date Reviewed: 11/22/2021          Codes Class Noted POA    Depression ICD-10-CM: F32. A  ICD-9-CM: 543  11/21/2021 Unknown        * (Principal) Multiple closed pelvic fractures without disruption of pelvic ring ([facility] [address] ICD-10-CM: I08.64CH  ICD-9-CM: 808.44  11/19/2021 Yes        Atrial fibrillation (HCC) ICD-10-CM: I48.91  ICD-9-CM: 427.31  11/12/2021 Yes        Type 2 diabetes mellitus, without long-term current use of insulin (HCC) ICD-10-CM: E11.9  ICD-9-CM: 250.00  5/17/2020 Yes        Acquired hypothyroidism ICD-10-CM: E03.9  ICD-9-CM: 244.9  5/17/2020 Yes        Hypotension ICD-10-CM: I95.9  ICD-9-CM: 458.9  9/12/8123 Yes        Complicated urinary tract infection ICD-10-CM: N39.0  ICD-9-CM: 599.0  2/20/2020 Yes              Background:   Past Medical History:   Past Medical History:   Diagnosis Date    Acidosis     Anemia     Arteriovenous fistula (HCC)     Chronic kidney disease     on HD at [facility] on [address] on MWF. [phone]    Chronic pain     CKD (chronic kidney disease)     Diabetes ([address]     no meds now    ESRD (end stage renal disease) on dialysis ([address] 9/9/2021    HLD (hyperlipidemia)     HTN (hypertension)     Hyperparathyroidism due to renal insufficiency ([address]     Hypothyroid     Kidney stone     Lung mass     Recurrent UTI     Ureter, stricture     Uric acid nephrolithiasis     Urinary incontinence         Assessment:   Changes in Assessment throughout shift: No change to previous assessment     Patient has a central line: no Reasons if yes: Insertion date: Last dressing date:   Patient has Cline Cath: no Reasons if yes:     Insertion date:    Last Vitals:     Vitals:    11/22/21 1618 11/22/21 1638 11/22/21 1726 11/22/21 2059   BP: 99/70 (!) 124/52 (!) 141/75 (!) 117/58   Pulse: 69 68 94 60   Resp:  18 14 16   Temp:  97.8 °F (36.6 °C) 97.1 °F (36.2 °C) 97.3 °F (36.3 °C)   TempSrc:  Oral     SpO2:   100% 96%   Height:            PAIN    Pain Assessment    Pain Intensity 1: 0 (11/23/21 0414) Pain Intensity 1: 2 (12/29/14 1105)    Pain Location 1: Groin Pain Location 1: Abdomen    Pain Intervention(s) 1: Medication (see MAR) Pain Intervention(s) 1: Medication (see MAR)  Patient Stated Pain Goal: 0 Patient Stated Pain Goal: 0  o Intervention effective: yes  o Other actions taken for pain: Medication (see MAR)     Skin Assessment  Skin color Skin Color: Appropriate for ethnicity  Condition/Temperature Skin Condition/Temp: Dry, Warm  Integrity Skin Integrity: Intact  Turgor Turgor: Non-tenting  Weekly Pressure Ulcer Documentation  Pressure  Injury Documentation: No Pressure Injury Noted-Pressure Ulcer Prevention Initiated  Wound Prevention & Protection Methods  Orientation of wound Orientation of Wound Prevention: Posterior  Location of Prevention Location of Wound Prevention: Sacrum/Coccyx  Dressing Present Dressing Present : No  Dressing Status    Wound Offloading Wound Offloading (Prevention Methods): Bed, pressure reduction mattress, Pillows, Specialty boot/shoe, Wheelchair, Elevate heels     INTAKE/OUPUT  Date 11/22/21 0700 - 11/23/21 0659 11/23/21 0700 - 11/24/21 0659   Shift 9022-5229 1173-7781 24 Hour Total 6043-4440 3590-5779 24 Hour Total   INTAKE   P.O. 440  440        P. O. 440  440      Shift Total 440  440      OUTPUT   Urine           Urine Occurrence(s)  0 x 0 x 0 x  0 x   Emesis/NG output           Emesis Occurrence(s)  0 x 0 x 0 x  0 x   Stool           Stool Occurrence(s)  0 x 0 x 1 x  1 x   Dialysis 1830 1830        NET Fluid Removed (mL) 1830 1830      Shift Total 1830 1830      NET -1390  -1390      Weight (kg) Recommendations:  1. Patient needs and requests: TOILETING    2. Pending tests/procedures: LABS PENDING     3. Functional Level/Equipment: Partial (one person) / Bed (comment); Jeronimo Rehman; Wheelchair    Fall Precautions:   Fall risk precautions were reinforced with the patient; she was instructed to call for help prior to getting up. The following fall risk precautions were continued: bed/ chair alarms, door signage, yellow bracelet and socks as well as update of the [employer] tool in the patient's room. Freddy Score: 4    HEALS Safety Check    A safety check occurred in the patient's room between off going nurse and oncoming nurse listed above. The safety check included the below items  Area Items   H  High Alert Medications - Verify all high alert medication drips (heparin, PCA, etc.)   E  Equipment - Suction is set up for ALL patients (with adelaide)  - Red plugs utilized for all equipment (IV pumps, etc.)  - WOWs wiped down at end of shift.  - Room stocked with oxygen, suction, and other unit-specific supplies   A  Alarms - Bed alarm is set for fall risk patients  - Ensure chair alarm is in place and activated if patient is up in a chair   L  Lines - Check IV for any infiltration  - Cline bag is empty if patient has a Cline   - Tubing and IV bags are labeled   S  Safety   - Room is clean, patient is clean, and equipment is clean. - Hallways are clear from equipment besides carts. - Fall bracelet on for fall risk patients  - Ensure room is clear and free of clutter  - Suction is set up for ALL patients (with adelaide)  - Hallways are clear from equipment besides carts.    - Isolation precautions followed, supplies available outside room, sign posted     Gueydan JOSE ANTONIO Gandhi

## 2021-11-23 NOTE — PROGRESS NOTES
Problem: Risk for Spread of Infection  Goal: Prevent transmission of infectious organism to others  Description: Prevent the transmission of infectious organisms to other patients, staff members, and visitors. Outcome: Progressing Towards Goal     Problem: Falls - Risk of  Goal: *Absence of Falls  Description: Document [facility] Fall Risk and appropriate interventions in the flowsheet. Outcome: Progressing Towards Goal  Note: Fall Risk Interventions:  Mobility Interventions: Bed/chair exit alarm, Patient to call before getting OOB, PT Consult for mobility concerns, PT Consult for assist device competence, Strengthening exercises (ROM-active/passive), Utilize walker, cane, or other assistive device, Utilize gait belt for transfers/ambulation    Mentation Interventions: Bed/chair exit alarm, Adequate sleep, hydration, pain control, Gait belt with transfers/ambulation, Increase mobility, More frequent rounding, Room close to nurse's station    Medication Interventions: Evaluate medications/consider consulting pharmacy, Patient to call before getting OOB, Teach patient to arise slowly, Utilize gait belt for transfers/ambulation    Elimination Interventions: Bed/chair exit alarm, Call light in reach, Patient to call for help with toileting needs, Stay With Me (per policy)    History of Falls Interventions: Bed/chair exit alarm, Door open when patient unattended, Room close to nurse's station, Utilize gait belt for transfer/ambulation, Assess for delayed presentation/identification of injury for 48 hrs (comment for end date)         Problem: Pressure Injury - Risk of  Goal: *Prevention of pressure injury  Description: Document Giovany Scale and appropriate interventions in the flowsheet.   Outcome: Progressing Towards Goal  Note: Pressure Injury Interventions:  Sensory Interventions: Assess changes in LOC, Assess need for specialty bed, Chair cushion, Minimize linen layers, Monitor skin under medical devices, Pressure redistribution bed/mattress (bed type), Sit a 90-degree angle/use footstool if needed    Moisture Interventions: Absorbent underpads, Apply protective barrier, creams and emollients, Limit adult briefs, Maintain skin hydration (lotion/cream), Minimize layers, Moisture barrier    Activity Interventions: Assess need for specialty bed, Chair cushion, Increase time out of bed, PT/OT evaluation, Pressure redistribution bed/mattress(bed type)    Mobility Interventions: Assess need for specialty bed, Chair cushion, HOB 30 degrees or less, Float heels, Pressure redistribution bed/mattress (bed type), PT/OT evaluation    Nutrition Interventions: Document food/fluid/supplement intake, Discuss nutritional consult with provider                     Problem: Patient Education: Go to Patient Education Activity  Goal: Patient/Family Education  Outcome: Progressing Towards Goal

## 2021-11-23 NOTE — PROGRESS NOTES
SHIFT CHANGE NOTE FOR [facility]    Bedside and Verbal shift change report given to ARTUR BRADY (oncoming nurse) by Darell Larson LPN (offgoing nurse). Report included the following information SBAR, Kardex, MAR and Recent Results.     Situation:   Code Status: DNR   Hospital Day: 4   Problem List:   Hospital Problems  Date Reviewed: 11/23/2021          Codes Class Noted POA    * (Principal) Multiple closed pelvic fractures without disruption of pelvic ring ([address] ICD-10-CM: A04.96TQ  ICD-9-CM: 808.44  11/19/2021 Yes        Anticoagulated by anticoagulation treatment ICD-10-CM: Z79.01  ICD-9-CM: V58.61  Unknown Yes    Overview Signed 11/23/2021  9:49 AM by Keri Link MD     On Apixaban             Paroxysmal atrial fibrillation (HCC) (Chronic) ICD-10-CM: I48.0  ICD-9-CM: 427.31  Unknown Yes        Closed fracture of left inferior pubic ramus, with routine healing, subsequent encounter ICD-10-CM: S32.592D  ICD-9-CM: V54.13  11/12/2021 Yes        Closed nondisplaced fracture of anterior wall of left acetabulum with routine healing ICD-10-CM: S32.415D  ICD-9-CM: V54.19  11/12/2021 Yes        Closed fracture of superior pubic ramus, left, with routine healing, subsequent encounter ICD-10-CM: S32.512D  ICD-9-CM: V54.19  11/12/2021 Yes        History of infection with vancomycin resistant Enterococcus (VRE) ICD-10-CM: Z86.19  ICD-9-CM: V12.09  10/8/2021 Yes    Overview Signed 11/23/2021  9:51 AM by Keri Link MD     Urine culture (collected 10/8/2021, resulted 10/14/2021) yielded growth of >100,000 colonies/ml of Enterococcus faecalis RESISTANT to Ciprofloxacin, Levofloxacin, Tetracycline and Vancomycin             History of urinary tract infection ICD-10-CM: Z87.440  ICD-9-CM: V13.02  10/8/2021 Yes    Overview Signed 11/23/2021  9:52 AM by Keri Link MD     Urine culture (collected 10/8/2021, resulted 10/14/2021) yielded growth of >100,000 colonies/ml of Enterococcus faecalis RESISTANT to Ciprofloxacin, Levofloxacin, Tetracycline and Vancomycin             Need for prophylactic isolation ICD-10-CM: Z41.8  ICD-9-CM: V07.0  10/8/2021 Yes    Overview Signed 11/23/2021  9:52 AM by Danyel To MD     Urine culture (collected 10/8/2021, resulted 10/14/2021) yielded growth of >100,000 colonies/ml of Enterococcus faecalis RESISTANT to Ciprofloxacin, Levofloxacin, Tetracycline and Vancomycin             End-stage renal disease on hemodialysis (HCC) (Chronic) ICD-10-CM: N18.6, Z99.2  ICD-9-CM: 585.6, V45.11  Unknown Yes    Overview Signed 11/23/2021  9:56 AM by Danyel To MD     HD at [facility] on MWF. 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 ([facility] (Chronic) ICD-10-CM: E11.22, N18.6  ICD-9-CM: 250.40, 585.6  Unknown Yes    Overview Signed 11/23/2021  9:50 AM by Danyel To MD     HbA1c (10/8/2021) = 4.6             Chronic hypotension (Chronic) ICD-10-CM: I95.89  ICD-9-CM: 458.1  Unknown Yes    Overview Signed 11/23/2021 10:01 AM by Danyel To MD     On Midodrine                   Background:   Past Medical History:   Past Medical History:   Diagnosis Date    Anemia in end-stage renal disease ([facility]     Anticoagulated by anticoagulation treatment     On Apixaban    Chronic hypotension     On Midodrine    Chronic pain     Closed fracture of left inferior pubic ramus, with routine healing, subsequent encounter 11/12/2021    Closed fracture of superior pubic ramus, left, with routine healing, subsequent encounter 11/12/2021    Closed nondisplaced fracture of anterior wall of left acetabulum with routine healing 11/12/2021    Depression     End-stage renal disease on hemodialysis ([facility]     HD at [facility] on MWF.  Tel # [phone]    Gastroesophageal reflux disease     Glaucoma     History of acute pyelonephritis 2/20/2020    History of hydronephrosis 10/5/2021    History of infection with vancomycin resistant Enterococcus (VRE) 10/8/2021    Urine culture (collected 10/8/2021, resulted 10/14/2021) yielded growth of >100,000 colonies/ml of Enterococcus faecalis RESISTANT to Ciprofloxacin, Levofloxacin, Tetracycline and Vancomycin    History of kidney stones     History of recurrent urinary tract infection     History of sepsis 6/18/2021    History of septic shock 10/8/2021    History of urethral stricture     History of urinary tract infection 10/8/2021    Urine culture (collected 10/8/2021, resulted 10/14/2021) yielded growth of >100,000 colonies/ml of Enterococcus faecalis RESISTANT to Ciprofloxacin, Levofloxacin, Tetracycline and Vancomycin    Hyperlipidemia     Hypothyroidism     Lung mass     Need for prophylactic isolation 10/8/2021    Urine culture (collected 10/8/2021, resulted 10/14/2021) yielded growth of >100,000 colonies/ml of Enterococcus faecalis RESISTANT to Ciprofloxacin, Levofloxacin, Tetracycline and Vancomycin    Paroxysmal atrial fibrillation (HCC)     Secondary hyperparathyroidism of renal origin ([facility]     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 ([facility]     HbA1c (10/8/2021) = 4.6    Uric acid nephrolithiasis     Urinary incontinence         Assessment:   Changes in Assessment throughout shift:       Patient has a central line: no Reasons if yes:    Insertion date: Last dressing date:   Patient has Cline Cath: no Reasons if yes:     Insertion date:  Last Vitals:     Vitals:    11/22/21 2059 11/23/21 0800 11/23/21 1212 11/23/21 1558   BP: (!) 117/58 (!) 131/46 (!) 143/46 (!) 120/53   Pulse: 60 (!) 53 85 82   Resp: 16 17  19   Temp: 97.3 °F (36.3 °C) 97.5 °F (36.4 °C)  97.6 °F (36.4 °C)   TempSrc:       SpO2: 96% 99%  92%   Height:            PAIN    Pain Assessment    Pain Intensity 1: 9 (11/23/21 1200) Pain Intensity 1: 2 (12/29/14 1105)    Pain Location 1: Groin Pain Location 1: Abdomen    Pain Intervention(s) 1: Medication (see MAR) Pain Intervention(s) 1: Medication (see MAR)  Patient Stated Pain Goal: 0 Patient Stated Pain Goal: 0  o Intervention effective: yes  o Other actions taken for pain: Medication (see MAR)     Skin Assessment  Skin color Skin Color: Appropriate for ethnicity  Condition/Temperature Skin Condition/Temp: Dry, Warm  Integrity Skin Integrity: Intact  Turgor Turgor: Non-tenting  Weekly Pressure Ulcer Documentation  Pressure  Injury Documentation: No Pressure Injury Noted-Pressure Ulcer Prevention Initiated  Wound Prevention & Protection Methods  Orientation of wound Orientation of Wound Prevention: Posterior  Location of Prevention Location of Wound Prevention: Buttocks, Heel, Sacrum/Coccyx  Dressing Present Dressing Present : No  Dressing Status    Wound Offloading Wound Offloading (Prevention Methods): Bed, pressure redistribution/air, Chair cushion, Pillows, Repositioning     INTAKE/OUPUT  Date 11/22/21 0700 - 11/23/21 0659 11/23/21 0700 - 11/24/21 0659   Shift 0142-7328 3364-0116 24 Hour Total 3819-0155 5227-2551 24 Hour Total   INTAKE   P.O. 440  440 240  240     P. O. 440  440 240  240   Shift Total 440  440 240  240   OUTPUT   Urine           Urine Occurrence(s)  0 x 0 x 0 x  0 x   Emesis/NG output           Emesis Occurrence(s)  0 x 0 x 0 x  0 x   Stool           Stool Occurrence(s)  0 x 0 x 1 x  1 x   Dialysis 1830 1830        NET Fluid Removed (mL) 1830 1830      Shift Total 1830  1830      NET -1390  -1390 240  240   Weight (kg)             Recommendations:  1. Patient needs and requests: TOILETING    2. Pending tests/procedures: LABS PENDING     3. Functional Level/Equipment: Partial (one person) / Wheelchair    Fall Precautions:   Fall risk precautions were reinforced with the patient; she was instructed to call for help prior to getting up. The following fall risk precautions were continued: bed/ chair alarms, door signage, yellow bracelet and socks as well as update of the Jeffrey Martines tool in the patient's room.    Freddy Score: 4    HEALS Safety Check    A safety check occurred in the patient's room between off going nurse and oncoming nurse listed above. The safety check included the below items  Area Items   H  High Alert Medications - Verify all high alert medication drips (heparin, PCA, etc.)   E  Equipment - Suction is set up for ALL patients (with adelaide)  - Red plugs utilized for all equipment (IV pumps, etc.)  - WOWs wiped down at end of shift.  - Room stocked with oxygen, suction, and other unit-specific supplies   A  Alarms - Bed alarm is set for fall risk patients  - Ensure chair alarm is in place and activated if patient is up in a chair   L  Lines - Check IV for any infiltration  - Cline bag is empty if patient has a Cline   - Tubing and IV bags are labeled   S  Safety   - Room is clean, patient is clean, and equipment is clean. - Hallways are clear from equipment besides carts. - Fall bracelet on for fall risk patients  - Ensure room is clear and free of clutter  - Suction is set up for ALL patients (with adelaide)  - Hallways are clear from equipment besides carts.    - Isolation precautions followed, supplies available outside room, sign posted     Claire Greenwood LPN

## 2021-11-23 NOTE — PROGRESS NOTES
Problem: Mobility Impaired (Adult and Pediatric)  Goal: *Therapy Goal (Edit Goal, Insert Text)  Description: Physical Therapy Short Term Goals  Initiated 11/20/2021 and to be accomplished within 7 day(s) on 11/27/2021  1. Patient will move from supine to sit and sit to supine , scoot up and down, and roll side to side in bed with supervision/set-up. 2.  Patient will transfer from bed to chair and chair to bed with moderate assistance  using the least restrictive device, consistently maintaining TTWB left LE. 3.  Patient will perform sit to stand with minimal to moderate assistance without additional assistance left LE to consistently maintain TTWB. 4.  Patient will perform w/c mobility SBA level over even surfaces for at least 200 ft before fatiguing. 5.  Patient will be able to perform static standing for at least 2 minute duration with 1-2 UE support before needing seated rest, maintaining TTWB left LE appropriately. Physical Therapy Long Term Goals  Initiated 11/20/2021 and to be accomplished within 28 day(s) 12/18/2021  1. Patient will move from supine to sit and sit to supine , scoot up and down, and roll side to side in bed with modified independence. 2.  Patient will transfer from bed to chair and chair to bed with modified independence using the least restrictive device. 3.  Patient will perform sit to stand with supervision/set-up. 4.  Patient will perform gait training if and when appropriate based on ability to consistently maintain TTWB left LE. 5.  Patient will perform w/c mobility mod I over even surfaces for at least 200 ft  6. Patient will negotiate w/c ramp with distant supervision.        Outcome: Progressing Towards Goal    PHYSICAL THERAPY TREATMENT    Patient: Leeann Valentine [de-identified]64 y.o. female)  Date: 11/23/2021  Diagnosis: Multiple closed pelvic fractures without disruption of pelvic ring (HCC) [S32.82XA] Multiple closed pelvic fractures without disruption of pelvic ring [facility])  Precautions: Contact, Fall, Skin, PWB (50% Left LE)  Chart, physical therapy assessment, plan of care and goals were reviewed. Time In:1003  Time Out:1130    Patient seen for: Balance activities; Gait training; Patient education; Transfer training; Wheelchair mobility    Pain:  Pt pain was reported as \"it's okay,\" pre-treatment. Pt pain was reported as 8-1010 post-treatment. Intervention: Pt's nursing staff notified and brought pt pain medication during treatment. Patient identified with name and : yes    SUBJECTIVE:      Pt is pleasant and cooperative but reports increased pain with participation in mobility and appears frustrated by difficulty tolerating weight bearing on left LE. Pt offered encouragement and pt's nursing staff educated pt on opportunities for pain management. Offered pt rest breaks as needed. Pt reports she feels well supported by her son and daughter-in-law at home. OBJECTIVE DATA SUMMARY:    Objective:     TRANSFERS Daily Assessment     Transfer Type: Other  Other: stand step with RW  Transfer Assistance : 4 (Minimal assistance)  Sit to Stand Assistance: Minimal assistance   Pt requires minimal assistance for balance and safety with verbal cues for safe foot placement maintaining left LE PWB with sit to stand and stand to sit transfers utilizing B UEs for support. Pt also requires minimal assistance for balance and safety with RW management in standing with verbal cues to lift left foot to advance it for stand step transfer as pt appears to attempt to pivot when fatigued.         GAIT Daily Assessment    Gait Description (WDL) Exceptions to WDL    Gait Abnormalities Decreased step clearance; Trendelenburg    Assistive device Walker, rolling    Ambulation assistance - level surface 4 (Minimal assistance)    Distance 61 Feet (ft) x 1 trial, 11 Feet x 1 trial after stair negotiation    Ambulation assistance- uneven surface  NT    Comments Pt ambulates with decreased left stance time and weight shift maintaining left LE PWB throughout requiring minimal assistance for balance, safety and safe RW management. STEPS/STAIRS Daily Assessment     Steps/Stairs ambulated 3 (4\")    Assistance Required 3 (Moderate assistance)    Rail Use Both    Comments   Pt requires minimal to moderate assistance for balance and weight shift ascending stairs with step to step pattern and moderate assistance for slow controlled descent due to increased left LE pain and decreased tolerance descending stairs with step to step pattern with left LE leading to maintain PWB left LE. Curbs/Ramps  NT        BALANCE Daily Assessment     Sitting - Static: Good (unsupported)  Sitting - Dynamic: Fair (occasional)  Standing - Static: Fair  Standing - Dynamic : Impaired        WHEELCHAIR MOBILITY Daily Assessment     Able to Propel (ft): 232 feet  Functional Level: 4        THERAPEUTIC EXERCISES Daily Assessment     Seated LE Strength Training:  3 Sets of 10 Repetitions:  Right and Left LAQ AROM  Alternate Hip Flexion        ASSESSMENT:  Pt is progressing well with functional mobility requiring decreased physical assistance with mobility and progressing to stair negotiation training. However, pt appears limited by pain during treatment session. Progression toward goals:  []      Improving appropriately and progressing toward goals  [x]      Improving slowly and progressing toward goals  []      Not making progress toward goals and plan of care will be adjusted      PLAN:  Patient continues to benefit from skilled intervention to address the above impairments. Continue treatment per established plan of care. Emphasize functional strength training and balance training to promote increased safety and independence with mobility.   Discharge Recommendations:  Home Health PT to progress to Outpatient PT  Further Equipment Recommendations for Discharge:  rolling walker      Estimated Discharge Date:12/17/2021    Activity Tolerance:   Fair  Please refer to the flowsheet for vital signs taken during this treatment. After treatment:   [] Patient left in no apparent distress in bed  [x] Patient left in no apparent distress sitting up in chair with OT tending to pt's needs in gym.   [] Patient left in no apparent distress in w/c mobilizing under own power  [] Patient left in no apparent distress dining area  [] Patient left in no apparent distress mobilizing under own power  [] Call bell left within reach  [] Nursing notified  [] Caregiver present  [] Bed alarm activated   [x] Chair alarm activated      Stacy William, PT, DPT  11/23/2021

## 2021-11-23 NOTE — PROGRESS NOTES
Problem: Self Care Deficits Care Plan (Adult)  Goal: *Therapy Goal (Edit Goal, Insert Text)  Description: Long Term Goals (to be met upon discharge date) in order to increase pt's functional independence and safety, and decrease burden of care:  1. Pt will perform self-feeding with Mod Independent  2. Pt will perform grooming with Mod Independent  3. Pt will perform UB bathing with supervision  4. Pt will perform LB bathing with SBA  5. Pt will perform shower transfer with SBA  6. Pt will perform UB dressing with supervision  7. Pt will perform LB dressing with SBA  8. Pt will perform toileting task with SBA  9. Pt will perform toilet transfer with SBA  10. Pt will perform an IADL task while standing with SBA     Short Term Weekly Goals for (2021-2021) in order to increase pt's functional independence and safety, and decrease burden of care:  1. Pt will perform self-feeding with supervision  2. Pt will perform grooming with supervision  3. Pt will perform UB bathing with SBA  4. Pt will perform LB bathing with Min A  5. Pt will perform shower transfer with Min A  6. Pt will perform UB dressing with SBA  7. Pt will perform LB dressing with Min A  8. Pt will perform toileting task with Min A  9. Pt will perform toilet transfer with Min A  Occupational Therapy TREATMENT    Patient: Boris Aguilar   64 y.o. Patient identified with name and : Yes     Date: 2021    First Tx Session  Time In: 0900  Time Out[de-identified] 1000    Second Tx Session  Time In: 11:30  Time Out[de-identified] 1200    Diagnosis: Multiple closed pelvic fractures without disruption of pelvic ring (HCC) [S32.82XA]   Precautions: Contact, TTWB, Fall, Skin  Chart, occupational therapy assessment, plan of care, and goals were reviewed. Pain:  Pt reports 3/10 pain or discomfort prior to treatment. Pt reports 3/10 pain or discomfort post treatment. Intervention Provided: N/A      SUBJECTIVE:   Patient stated I want to get out of here.  referring to participation in tx session. OBJECTIVE DATA SUMMARY:     THERAPEUTIC ACTIVITY Daily Assessment    Perfection task to increase balance tolerance for ADLs. Pt able to stand up to 5 minutes. THERAPEUTIC EXERCISE Daily Assessment    To increase BUE strength/ROM  for ADLs:    Pt propelled w/c from room<>gymx1 and to room x1. Sci Fit up to 10 minutes. Chest press and overhead 3x10 #1 therabar  Instruct pt. in home exercise program: UB HEP (chess press,  butterfly wings, bicep curls 3x10 with 2lb dumb bell in both hands. Chair raises 3x5. IADL Daily Assessment    Medication Management: Pt given 3 bottles of Faux pills to organize in sorter. Pt completed task with 100% accuracy. MOBILITY/TRANSFERS Daily Assessment     Sit to stand with Min-Moda x3. Pt able to follow weightbearing precautions. ASSESSMENT:  Pt working on increasing strength and balance for ADLs. Progression toward goals:  [x]          Improving appropriately and progressing toward goals  []          Improving slowly and progressing toward goals  []          Not making progress toward goals and plan of care will be adjusted     PLAN:  Patient continues to benefit from skilled intervention to address the above impairments. Continue treatment per established plan of care. Discharge Recommendations:  Home Health with 24 hr asst  Further Equipment Recommendations for Discharge:  bedside commode      Activity Tolerance:  Fair       Estimated LOS:    Please refer to the flowsheet for vital signs taken during this treatment. After treatment:   []  Patient left in no apparent distress sitting up in chair   [x]  Patient left in no apparent distress in bed  []  Call bell left within reach  []  Nursing notified  []  Caregiver present  []  Bed alarm activated    COMMUNICATION/EDUCATION:   [] Home safety education was provided and the patient/caregiver indicated understanding.   [x] Patient/family have participated as able in goal setting and plan of care. [] Patient/family agree to work toward stated goals and plan of care. [] Patient understands intent and goals of therapy, but is neutral about his/her participation. [] Patient is unable to participate in goal setting and plan of care.       Nicola Vivas, OT   Outcome: Progressing Towards Goal

## 2021-11-23 NOTE — PROGRESS NOTES
[facility] PHYSICAL [address] [facility], [address]     INPATIENT REHABILITATION  DAILY PROGRESS NOTE     Date: 11/23/2021    Name: Ev Menendez Age / Sex: 64 y.o. / female   CSN: [id number] MRN: [id number]   6 [facility] Date: 11/19/2021 Length of Stay: 4 days     Primary Rehab Diagnosis: Impaired Mobility and ADLs secondary to Multiple closed pelvic fractures (comminuted fracture involving the left anterior acetabular wall with involvement of the base of the left superior pubic ramus which are not significantly displaced; nondisplaced left inferior pubic ramus fracture)      Subjective:     Patient seen and examined. Blood pressure controlled.  Team conference was held at bedside this PM.     Patient's Complaint:   No significant medical complaints    Pain Control: stable, mild-to-moderate joint symptoms intermittently, reasonably well controlled by current meds      Objective:     Vital Signs:  Patient Vitals for the past 24 hrs:   BP Temp Temp src Pulse Resp SpO2   11/23/21 0800 (!) 131/46 97.5 °F (36.4 °C) -- (!) 53 17 99 %   11/22/21 2059 (!) 117/58 97.3 °F (36.3 °C) -- 60 16 96 %   11/22/21 1726 (!) 141/75 97.1 °F (36.2 °C) -- 94 14 100 %   11/22/21 1638 (!) 124/52 97.8 °F (36.6 °C) Oral 68 18 --   11/22/21 1618 99/70 -- -- 69 -- --   11/22/21 1615 (!) 102/57 -- -- 63 -- --   11/22/21 1600 (!) 102/59 -- -- (!) 59 -- --   11/22/21 1545 (!) 116/59 -- -- 68 -- --   11/22/21 1530 124/74 -- -- 71 -- --   11/22/21 1515 (!) 100/57 -- -- 63 -- --   11/22/21 1500 105/85 -- -- 63 -- --   11/22/21 1445 114/66 -- -- (!) 58 -- --   11/22/21 1430 (!) 109/50 -- -- 68 -- --   11/22/21 1415 101/60 -- -- 65 -- --   11/22/21 1400 (!) 108/38 -- -- (!) 59 -- --   11/22/21 1345 108/65 -- -- 67 -- --   11/22/21 1330 (!) 113/56 -- -- 63 -- --   11/22/21 1316 (!) 108/59 -- -- 62 -- --   11/22/21 1310 115/60 97.7 °F (36.5 °C) Oral 64 18 --   11/22/21 1231 -- -- -- 72 -- --   11/22/21 1227 (!) 112/54 -- -- -- -- --        Physical Examination:  GENERAL SURVEY: Patient is awake, alert, oriented x 3, sitting comfortably on the chair, not in acute respiratory distress. HEENT: pink palpebral conjunctivae, anicteric sclerae, no nasoaural discharge, moist oral mucosa  NECK: supple, no jugular venous distention, no palpable lymph nodes  CHEST/LUNGS: symmetrical chest expansion, good air entry, clear breath sounds  HEART: adynamic precordium, good S1 S2, no S3, regular rhythm, no murmurs  ABDOMEN: obese, bowel sounds appreciated, soft, non-tender  EXTREMITIES: pink nailbeds, no edema, full and equal pulses, no calf tenderness   NEUROLOGICAL EXAM: The patient is awake, alert and oriented x3, able to answer questions fairly appropriately, able to follow 1 and 2 step commands. Able to tell time from the wall clock. Cranial nerves II-XII are grossly intact. No gross sensory deficit. Motor strength is 4 to 4+/5 on BUE and RLE, 2+ to 3-/5 on the left hip, 3 to 3+/5 on the left knee and left ankle.        Current Medications:  Current Facility-Administered Medications   Medication Dose Route Frequency    allopurinoL (ZYLOPRIM) tablet 100 mg  100 mg Oral Q MON, WED & FRI    epoetin caitlin-epbx (RETACRIT) injection 8,000 Units  8,000 Units SubCUTAneous Q MON, WED & FRI    cetirizine (ZYRTEC) tablet 10 mg  10 mg Oral DAILY    polyethylene glycol (MIRALAX) packet 17 g  17 g Oral DAILY    acetaminophen (TYLENOL) tablet 650 mg  650 mg Oral Q4H PRN    bisacodyL (DULCOLAX) tablet 10 mg  10 mg Oral Q48H PRN    amiodarone (CORDARONE) tablet 200 mg  200 mg Oral DAILY    cefpodoxime (VANTIN) tablet 200 mg  200 mg Oral Q24H    midodrine (PROAMATINE) tablet 10 mg  10 mg Oral TID WITH MEALS    acetaminophen (TYLENOL) tablet 650 mg  650 mg Oral TID    apixaban (ELIQUIS) tablet 5 mg  5 mg Oral BID    HYDROmorphone (DILAUDID) tablet 1 mg  1 mg Oral Q8H PRN    meclizine (ANTIVERT) tablet 12.5 mg  12.5 mg Oral TID PRN    DULoxetine (CYMBALTA) capsule 20 mg  20 mg Oral DAILY    vitamin B complex-folic acid 0.4 mg tablet  1 Tablet Oral DAILY    cyanocobalamin tablet 1,000 mcg  1,000 mcg Oral DAILY    L. acidophilus,casei,rhamnosus (BIO-K PLUS) capsule 1 Capsule  1 Capsule Oral DAILY    ascorbic acid (vitamin C) (VITAMIN C) tablet 500 mg  500 mg Oral DAILY    cholecalciferol (VITAMIN D3) (1000 Units /25 mcg) tablet 2,000 Units  2,000 Units Oral BID    latanoprost (XALATAN) 0.005 % ophthalmic solution 1 Drop  1 Drop Both Eyes QPM    levothyroxine (SYNTHROID) tablet 125 mcg  125 mcg Oral 6am    pantoprazole (PROTONIX) tablet 20 mg  20 mg Oral ACB    ondansetron (ZOFRAN ODT) tablet 4 mg  4 mg Oral TID PRN    lidocaine 4 % patch 1 Patch  1 Patch TransDERmal Q24H    gabapentin (NEURONTIN) capsule 200 mg  200 mg Oral Q MON, WED & FRI    doxercalciferoL (HECTOROL) 4 mcg/2 mL injection 4 mcg  4 mcg IntraVENous DIALYSIS MON, WED & FRI       Allergies:   Allergies   Allergen Reactions    Albumin, Human 25 % Itching     Headache - severe migraine like, itchy eyes, runny nose    Ciprofloxacin Hives    Cyclopentolate Unknown (comments)    Iron Sucrose Diarrhea    Statins-Hmg-Coa Reductase Inhibitors Other (comments)     Body ache       Functional Progress:    OCCUPATIONAL THERAPY    ON ADMISSION MOST RECENT   Eating  Functional Level: 5   Eating  Functional Level: 5     Grooming  Functional Level: 5   Grooming  Functional Level: 5     Bathing  Functional Level: 3   Bathing  Functional Level: 3     Upper Body Dressing  Functional Level: 4   Upper Body Dressing  Functional Level: 4     Lower Body Dressing  Functional Level: 3   Lower Body Dressing  Functional Level: 3     Toileting  Functional Level: 3   Toileting  Functional Level: 3     Toilet Transfers  Toilet Transfer Score: 4   Toilet Transfers  Toilet Transfer Score: 4     Tub /Shower Transfers  Tub/Shower Transfer Score: 4   Tub/Shower Transfers  Tub/Shower Transfer Score: 4 Legend:   7 - Independent   6 - Modified Independent   5 - Standby Assistance / Supervision / Set-up   4 - Minimum Assistance / Contact Guard Assistance   3 - Moderate Assistance   2 - Maximum Assistance   1 - Total Assistance / Dependent       Lab/Data Review:  No results found for this or any previous visit (from the past 24 hour(s)). Assessment:     Primary Rehabilitation Diagnosis  1. Impaired Mobility and ADLs  2. 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 ([facility]) E11.22, N18.6    [facility]roidism of renal origin [facility] N25.81    Gastroesophageal reflux disease K21.9    History of recurrent urinary tract infection Z87.440    History of sepsis Z86.19    End-stage renal disease on hemodialysis ([facility]) N18.6, Z99.2    History of hydronephrosis Z87.448    History of septic shock Z86.19    Anticoagulated by anticoagulation treatment Z79.01    Paroxysmal atrial fibrillation ([facility]) I48.0    Closed fracture of left inferior pubic ramus, with routine healing, subsequent encounter S32.592D    Closed nondisplaced fracture of anterior wall of left acetabulum with routine healing S32.415D    Closed fracture of superior pubic ramus, left, with routine healing, subsequent encounter S32.512D    Multiple closed pelvic fractures without disruption of pelvic ring ([facility] [facility] S32.82XA    Depression F32. A    History of urinary tract infection Z87.440    Need for prophylactic isolation Z41.8    Hyperlipidemia E78.5    Hypothyroidism E03.9    History of kidney stones Z87.442    Chronic pain G89.29    Lung mass R91.8    History of urethral stricture Z87.448    Uric acid nephrolithiasis N20.0    Urinary incontinence R32    Glaucoma H40.9       Plan:     1. Justification for continued stay: Good progression towards established rehabilitation goals. 2. Medical Issues being followed closely:    [x]  Fall and safety precautions     []  Wound Care     [x]  Bowel and Bladder Function     [x]  Fluid Electrolyte and Nutrition Balance     [x]  Pain Control      3. Issues that 24 hour rehabilitation nursing is following:    [x]  Fall and safety precautions     []  Wound Care     [x]  Bowel and Bladder Function     [x]  Fluid Electrolyte and Nutrition Balance     [x]  Pain Control      [x]  Assistance with and education on in-room safety with transfers to and from the bed, wheelchair, toilet and shower. 4. Acute rehabilitation plan of care:    [x]  Continue current care and rehab. [x]  Physical Therapy           [x]  Occupational Therapy           []  Speech Therapy     []  Hold Rehab until further notice     5. Medications:    [x]  MAR Reviewed     [x]  Continue Present Medications     6. DVT Prophylaxis:      []  Enoxaparin     []  Unfractionated Heparin     []  Warfarin     [x]  NOAC     []  AMELIE Stockings     []  Sequential Compression Device     []  None     7. Code status    []  Full code     []  Partial code     []  Do not intubate     [x]  Do not resuscitate     8.  Orders:   > Multiple closed pelvic fractures (comminuted fracture involving the left anterior acetabular wall with involvement of the base of the left superior pubic ramus which are not significantly displaced; nondisplaced left inferior pubic ramus fracture)   > Orthopedic surgery recommending non-surgical management with PT, pain control   > Partial (50%) weightbearing on the left lower extremity     > Urinary tract infection, History of right ureteral stent   > Urine culture (collected 11/16/2021, resulted 11/20/2021) yielded growth of >100,000 colonies/ml of Proteus mirabilis RESISTANT to Nitrofurantoin   > Started on IV ceftriaxone and transitioned to cefpodoxime   > On discharge, patient is to follow up with Urology (Dr. Kt Singh)   > Continue Cefpodoxime 200 mg PO q 24 hr (STOP DATE: 11/25/2021)     > History of VRE urinary tract infection, 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)   > Urine culture (collected 10/8/2021, resulted 10/14/2021) yielded growth of >100,000 colonies/ml of Enterococcus faecalis RESISTANT to Ciprofloxacin, Levofloxacin, Tetracycline and Vancomycin   > Contact isolation    > Paroxysmal atrial fibrillation, anticoagulated on Apixaban   > Anticoagulation    > Continue Apixaban 5 mg PO BID    > Rate-control    > Continue Amiodarone 200 mg PO once daily    > Glaucoma    > Continue Latanoprost 0.005% ophthalmic solution, 1 drop into each ete q PM    > Hypothyroidism   > TSH (11/13/2021) = 11.4   > TSH (11/22/2021) = 1.69   > Free T4 (11/22/2021) = 1.1   > Continue Levothyroxine 125 mcg PO once daily     > Chronic hypotension   > Decrease Midodrine from 10 mg to 5 mg PO TID with meals     > End-stage renal disease, on hemodialysis (Mon-Wed-Fri)   > Nephrology consult (Dr. 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 Allergy to albumin   > Will list as allergy per pt and daughter-in-law request.  Start antihistamine. Monitor.    > Analgesia / Chronic low back pain   > Continue:    > Acetaminophen 650 mg PO TID (8AM, 12PM, 4PM)    > Acetaminophen 650 mg PO q 4 hr PRN for pain level 4/10 or lesser (from 8PM to 4AM only)    > Duloxetine 20 mg PO once daily    > Gabapentin 200 mg PO q Mon-Wed-Fri    > Lidocaine 4% patch, 1 patch on affected area q 24 hr (12 hr on, 12 hr off)    > Hydromorphone 1 mg PO q 8 hr PRN for pain level 5/10 or greater     > Diet:   > Specifications: 3 carb choices (45 gm/meal); Low fat/Low cholesterol/High fiber/IRINEO; Low potassium (less than 3,000 mg/day); Low phosphorus (less than 1,000 mg/day)   > Solids (consistency): Regular   > Liquids (consistency): Thin   > Fluid restriction: None       9. Personal Protective Equipment (N95 face mask and eye goggles) was used while interacting with the patient. Patient was using a surgical mask. 10. Patient's progress in rehabilitation and medical issues discussed with the patient. All questions answered to the best of my ability. Care plan discussed with patient and nurse. 11. Total clinical care time is 30 minutes, including review of chart including all labs, radiology, past medical history, and discussion with patient. Greater than 50% of my time was spent in coordination of care and counseling.       Nati Mcguire MD    November 23, 2021

## 2021-11-24 LAB
A VAGINAE DNA VAG QL NAA+PROBE: NORMAL SCORE
ALBUMIN SERPL-MCNC: 3.4 G/DL (ref 3.4–5)
ANION GAP SERPL CALC-SCNC: 11 MMOL/L (ref 3–18)
BASOPHILS # BLD: 0.1 K/UL (ref 0–0.1)
BASOPHILS NFR BLD: 1 % (ref 0–2)
BUN SERPL-MCNC: 47 MG/DL (ref 7–18)
BUN/CREAT SERPL: 7 (ref 12–20)
BVAB2 DNA VAG QL NAA+PROBE: NORMAL SCORE
C ALBICANS DNA VAG QL NAA+PROBE: NEGATIVE
C GLABRATA DNA VAG QL NAA+PROBE: NEGATIVE
C PARAPSILOSIS/TROPICALIS: NEGATIVE
C TRACH RRNA SPEC QL NAA+PROBE: NORMAL
CALCIUM SERPL-MCNC: 9.1 MG/DL (ref 8.5–10.1)
CANDIDA KRUSEI, NAA, 180016: NEGATIVE
CANDIDA LUSITANIAE, NAA, 180015: NEGATIVE
CHLORIDE SERPL-SCNC: 101 MMOL/L (ref 100–111)
CO2 SERPL-SCNC: 25 MMOL/L (ref 21–32)
CREAT SERPL-MCNC: 6.87 MG/DL (ref 0.6–1.3)
DIFFERENTIAL METHOD BLD: ABNORMAL
EOSINOPHIL # BLD: 0.2 K/UL (ref 0–0.4)
EOSINOPHIL NFR BLD: 3 % (ref 0–5)
ERYTHROCYTE [DISTWIDTH] IN BLOOD BY AUTOMATED COUNT: 15.7 % (ref 11.6–14.5)
GLUCOSE SERPL-MCNC: 81 MG/DL (ref 74–99)
HCT VFR BLD AUTO: 29.4 % (ref 35–45)
HGB BLD-MCNC: 8.8 G/DL (ref 12–16)
IMM GRANULOCYTES # BLD AUTO: 0.1 K/UL (ref 0–0.04)
IMM GRANULOCYTES NFR BLD AUTO: 1 % (ref 0–0.5)
LYMPHOCYTES # BLD: 2.5 K/UL (ref 0.9–3.6)
LYMPHOCYTES NFR BLD: 39 % (ref 21–52)
MCH RBC QN AUTO: 30 PG (ref 24–34)
MCHC RBC AUTO-ENTMCNC: 29.9 G/DL (ref 31–37)
MCV RBC AUTO: 100.3 FL (ref 78–100)
MEGA1 DNA VAG QL NAA+PROBE: NORMAL SCORE
MONOCYTES # BLD: 0.8 K/UL (ref 0.05–1.2)
MONOCYTES NFR BLD: 12 % (ref 3–10)
N GONORRHOEA RRNA SPEC QL NAA+PROBE: NORMAL
NEUTS SEG # BLD: 2.8 K/UL (ref 1.8–8)
NEUTS SEG NFR BLD: 44 % (ref 40–73)
NRBC # BLD: 0 K/UL (ref 0–0.01)
NRBC BLD-RTO: 0 PER 100 WBC
PHOSPHATE SERPL-MCNC: 8.1 MG/DL (ref 2.5–4.9)
PLATELET # BLD AUTO: 317 K/UL (ref 135–420)
PMV BLD AUTO: 9.6 FL (ref 9.2–11.8)
POTASSIUM SERPL-SCNC: 5.7 MMOL/L (ref 3.5–5.5)
RBC # BLD AUTO: 2.93 M/UL (ref 4.2–5.3)
SODIUM SERPL-SCNC: 137 MMOL/L (ref 136–145)
T VAGINALIS RRNA SPEC QL NAA+PROBE: NORMAL
WBC # BLD AUTO: 6.3 K/UL (ref 4.6–13.2)

## 2021-11-24 PROCEDURE — 65310000000 HC RM PRIVATE REHAB

## 2021-11-24 PROCEDURE — 36415 COLL VENOUS BLD VENIPUNCTURE: CPT

## 2021-11-24 PROCEDURE — 90935 HEMODIALYSIS ONE EVALUATION: CPT

## 2021-11-24 PROCEDURE — 74011250636 HC RX REV CODE- 250/636: Performed by: INTERNAL MEDICINE

## 2021-11-24 PROCEDURE — 74011250637 HC RX REV CODE- 250/637: Performed by: FAMILY MEDICINE

## 2021-11-24 PROCEDURE — 80069 RENAL FUNCTION PANEL: CPT

## 2021-11-24 PROCEDURE — 97110 THERAPEUTIC EXERCISES: CPT

## 2021-11-24 PROCEDURE — 74011250637 HC RX REV CODE- 250/637: Performed by: INTERNAL MEDICINE

## 2021-11-24 PROCEDURE — 97116 GAIT TRAINING THERAPY: CPT

## 2021-11-24 PROCEDURE — 97535 SELF CARE MNGMENT TRAINING: CPT

## 2021-11-24 PROCEDURE — 74011000250 HC RX REV CODE- 250: Performed by: INTERNAL MEDICINE

## 2021-11-24 PROCEDURE — 99232 SBSQ HOSP IP/OBS MODERATE 35: CPT | Performed by: INTERNAL MEDICINE

## 2021-11-24 PROCEDURE — 97530 THERAPEUTIC ACTIVITIES: CPT

## 2021-11-24 PROCEDURE — 85025 COMPLETE CBC W/AUTO DIFF WBC: CPT

## 2021-11-24 PROCEDURE — 2709999900 HC NON-CHARGEABLE SUPPLY

## 2021-11-24 RX ADMIN — ACETAMINOPHEN 650 MG: 325 TABLET ORAL at 09:22

## 2021-11-24 RX ADMIN — PANTOPRAZOLE SODIUM 20 MG: 20 TABLET, DELAYED RELEASE ORAL at 09:22

## 2021-11-24 RX ADMIN — CEFPODOXIME PROXETIL 200 MG: 200 TABLET, FILM COATED ORAL at 18:03

## 2021-11-24 RX ADMIN — Medication 1 TABLET: at 09:22

## 2021-11-24 RX ADMIN — ACETAMINOPHEN 650 MG: 325 TABLET ORAL at 12:40

## 2021-11-24 RX ADMIN — EPOETIN ALFA-EPBX 8000 UNITS: 4000 INJECTION, SOLUTION INTRAVENOUS; SUBCUTANEOUS at 21:15

## 2021-11-24 RX ADMIN — ALLOPURINOL 100 MG: 100 TABLET ORAL at 21:15

## 2021-11-24 RX ADMIN — Medication 1 CAPSULE: at 09:23

## 2021-11-24 RX ADMIN — Medication 500 MG: at 09:22

## 2021-11-24 RX ADMIN — ACETAMINOPHEN 650 MG: 325 TABLET ORAL at 00:50

## 2021-11-24 RX ADMIN — CETIRIZINE HYDROCHLORIDE 10 MG: 10 TABLET, FILM COATED ORAL at 09:22

## 2021-11-24 RX ADMIN — GABAPENTIN 200 MG: 100 CAPSULE ORAL at 18:03

## 2021-11-24 RX ADMIN — LATANOPROST 1 DROP: 50 SOLUTION OPHTHALMIC at 21:15

## 2021-11-24 RX ADMIN — MIDODRINE HYDROCHLORIDE 5 MG: 5 TABLET ORAL at 09:22

## 2021-11-24 RX ADMIN — SODIUM ZIRCONIUM CYCLOSILICATE 10 G: 10 POWDER, FOR SUSPENSION ORAL at 12:41

## 2021-11-24 RX ADMIN — ACETAMINOPHEN 650 MG: 325 TABLET ORAL at 18:04

## 2021-11-24 RX ADMIN — LEVOTHYROXINE SODIUM 125 MCG: 125 TABLET ORAL at 06:07

## 2021-11-24 RX ADMIN — CYANOCOBALAMIN TAB 1000 MCG 1000 MCG: 1000 TAB at 09:22

## 2021-11-24 RX ADMIN — AMIODARONE HYDROCHLORIDE 200 MG: 200 TABLET ORAL at 09:22

## 2021-11-24 RX ADMIN — MIDODRINE HYDROCHLORIDE 5 MG: 5 TABLET ORAL at 12:40

## 2021-11-24 RX ADMIN — DULOXETINE HYDROCHLORIDE 20 MG: 20 CAPSULE, DELAYED RELEASE ORAL at 09:22

## 2021-11-24 RX ADMIN — CHOLECALCIFEROL TAB 25 MCG (1000 UNIT) 2000 UNITS: 25 TAB at 18:04

## 2021-11-24 RX ADMIN — MIDODRINE HYDROCHLORIDE 5 MG: 5 TABLET ORAL at 18:03

## 2021-11-24 RX ADMIN — CHOLECALCIFEROL TAB 25 MCG (1000 UNIT) 2000 UNITS: 25 TAB at 09:21

## 2021-11-24 RX ADMIN — APIXABAN 5 MG: 5 TABLET, FILM COATED ORAL at 09:23

## 2021-11-24 RX ADMIN — APIXABAN 5 MG: 5 TABLET, FILM COATED ORAL at 18:04

## 2021-11-24 NOTE — PROGRESS NOTES
SHIFT CHANGE NOTE FOR [facility]    Bedside and Verbal shift change report given to [address] (oncoming nurse) by Pamela Yusuf LPN (offgoing nurse). Report included the following information SBAR, Kardex, MAR and Recent Results.     Situation:   Code Status: DNR   Hospital Day: 5   Problem List:   Hospital Problems  Date Reviewed: 11/24/2021          Codes Class Noted POA    * (Principal) Multiple closed pelvic fractures without disruption of pelvic ring ([facility] ICD-10-CM: T04.15YJ  ICD-9-CM: 808.44  11/19/2021 Yes        Anticoagulated by anticoagulation treatment ICD-10-CM: Z79.01  ICD-9-CM: V58.61  Unknown Yes    Overview Signed 11/23/2021  9:49 AM by Danyel To MD     On Apixaban             Paroxysmal atrial fibrillation (HCC) (Chronic) ICD-10-CM: I48.0  ICD-9-CM: 427.31  Unknown Yes        Closed fracture of left inferior pubic ramus, with routine healing, subsequent encounter ICD-10-CM: S32.592D  ICD-9-CM: V54.13  11/12/2021 Yes        Closed nondisplaced fracture of anterior wall of left acetabulum with routine healing ICD-10-CM: S32.415D  ICD-9-CM: V54.19  11/12/2021 Yes        Closed fracture of superior pubic ramus, left, with routine healing, subsequent encounter ICD-10-CM: S32.512D  ICD-9-CM: V54.19  11/12/2021 Yes        History of infection with vancomycin resistant Enterococcus (VRE) ICD-10-CM: Z86.19  ICD-9-CM: V12.09  10/8/2021 Yes    Overview Signed 11/23/2021  9:51 AM by Danyel To MD     Urine culture (collected 10/8/2021, resulted 10/14/2021) yielded growth of >100,000 colonies/ml of Enterococcus faecalis RESISTANT to Ciprofloxacin, Levofloxacin, Tetracycline and Vancomycin             History of urinary tract infection ICD-10-CM: Z87.440  ICD-9-CM: V13.02  10/8/2021 Yes    Overview Signed 11/23/2021  9:52 AM by Danyel To MD     Urine culture (collected 10/8/2021, resulted 10/14/2021) yielded growth of >100,000 colonies/ml of Enterococcus faecalis RESISTANT to Ciprofloxacin, Levofloxacin, Tetracycline and Vancomycin             Need for prophylactic isolation ICD-10-CM: Z41.8  ICD-9-CM: V07.0  10/8/2021 Yes    Overview Signed 11/23/2021  9:52 AM by Beverley Rosa MD     Urine culture (collected 10/8/2021, resulted 10/14/2021) yielded growth of >100,000 colonies/ml of Enterococcus faecalis RESISTANT to Ciprofloxacin, Levofloxacin, Tetracycline and Vancomycin             End-stage renal disease on hemodialysis (HCC) (Chronic) ICD-10-CM: N18.6, Z99.2  ICD-9-CM: 585.6, V45.11  Unknown Yes    Overview Signed 11/23/2021  9:56 AM by Beverley Rosa MD     HD at [facility] on MWF. Tel # [phone]             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 ([facility] (Chronic) ICD-10-CM: E11.22, N18.6  ICD-9-CM: 250.40, 585.6  Unknown Yes    Overview Signed 11/23/2021  9:50 AM by Beverley Rosa MD     HbA1c (10/8/2021) = 4.6             Chronic hypotension (Chronic) ICD-10-CM: I95.89  ICD-9-CM: 458.1  Unknown Yes    Overview Signed 11/23/2021 10:01 AM by Beverley Rosa MD     On Midodrine                   Background:   Past Medical History:   Past Medical History:   Diagnosis Date    Anemia in end-stage renal disease ([facility]     Anticoagulated by anticoagulation treatment     On Apixaban    Chronic hypotension     On Midodrine    Chronic pain     Closed fracture of left inferior pubic ramus, with routine healing, subsequent encounter 11/12/2021    Closed fracture of superior pubic ramus, left, with routine healing, subsequent encounter 11/12/2021    Closed nondisplaced fracture of anterior wall of left acetabulum with routine healing 11/12/2021    Depression     End-stage renal disease on hemodialysis ([facility]     HD at [facility] on MWF.  Tel # [phone]    Gastroesophageal reflux disease     Glaucoma     History of acute pyelonephritis 2/20/2020    History of hydronephrosis 10/5/2021    History of infection with vancomycin resistant Enterococcus (VRE) 10/8/2021    Urine culture (collected 10/8/2021, resulted 10/14/2021) yielded growth of >100,000 colonies/ml of Enterococcus faecalis RESISTANT to Ciprofloxacin, Levofloxacin, Tetracycline and Vancomycin    History of kidney stones     History of recurrent urinary tract infection     History of sepsis 6/18/2021    History of septic shock 10/8/2021    History of urethral stricture     History of urinary tract infection 10/8/2021    Urine culture (collected 10/8/2021, resulted 10/14/2021) yielded growth of >100,000 colonies/ml of Enterococcus faecalis RESISTANT to Ciprofloxacin, Levofloxacin, Tetracycline and Vancomycin    Hyperlipidemia     Hypothyroidism     Lung mass     Need for prophylactic isolation 10/8/2021    Urine culture (collected 10/8/2021, resulted 10/14/2021) yielded growth of >100,000 colonies/ml of Enterococcus faecalis RESISTANT to Ciprofloxacin, Levofloxacin, Tetracycline and Vancomycin    Paroxysmal atrial fibrillation (HCC)     Secondary hyperparathyroidism of renal origin ([facility]     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 ([facility] [facility]     HbA1c (10/8/2021) = 4.6    Uric acid nephrolithiasis     Urinary incontinence         Assessment:   Changes in Assessment throughout shift: No change to previous assessment     Patient has a central line: no Reasons if yes: Insertion date: Last dressing date:   Patient has Cline Cath: no Reasons if yes:     Insertion date:       Last Vitals:     Vitals:    11/24/21 1400 11/24/21 1415 11/24/21 1430 11/24/21 1445   BP: (!) 110/52 (!) 101/44 (!) 99/55 (!) 110/44   Pulse: 74 67 66 63   Resp:       Temp:       TempSrc:       SpO2:       Height:            PAIN    Pain Assessment    Pain Intensity 1: 7 (11/24/21 1200) Pain Intensity 1: 2 (12/29/14 1105)    Pain Location 1: Groin Pain Location 1: Abdomen    Pain Intervention(s) 1: Medication (see MAR), Cold pack Pain Intervention(s) 1: Medication (see MAR)  Patient Stated Pain Goal: 0 Patient Stated Pain Goal: 0  o Intervention effective: yes  o Other actions taken for pain:       Skin Assessment  Skin color Skin Color: Appropriate for ethnicity  Condition/Temperature Skin Condition/Temp: Dry, Warm  Integrity Skin Integrity: Intact  Turgor Turgor: Non-tenting  Weekly Pressure Ulcer Documentation  Pressure  Injury Documentation: No Pressure Injury Noted-Pressure Ulcer Prevention Initiated  Wound Prevention & Protection Methods  Orientation of wound Orientation of Wound Prevention: Posterior  Location of Prevention Location of Wound Prevention: Buttocks, Heel, Sacrum/Coccyx  Dressing Present Dressing Present : No  Dressing Status    Wound Offloading Wound Offloading (Prevention Methods): Bed, pressure redistribution/air, Chair cushion, Pillows, Repositioning, Bed, pressure reduction mattress     INTAKE/OUPUT  Date 11/23/21 0700 - 11/24/21 0659 11/24/21 0700 - 11/25/21 0659   Shift [id number] 24 Hour Total 2104-6122 5486-8985 24 Hour Total   INTAKE   P.O. 240 240 480 240  240     P. O. 240 240 480 240  240   Shift Total 240 240 480 240  240   OUTPUT   Urine           Urine Occurrence(s) 0 x 1 x 1 x 0 x  0 x   Emesis/NG output           Emesis Occurrence(s) 0 x  0 x      Stool           Stool Occurrence(s) 2 x 1 x 3 x 0 x  0 x   Shift Total          240 480 240  240   Weight (kg)             Recommendations:  1. Patient needs and requests: TOILETING    2. Pending tests/procedures: LABS PENDING     3. Functional Level/Equipment: Partial (one person) / Bed (comment)    Fall Precautions:   Fall risk precautions were reinforced with the patient; she was instructed to call for help prior to getting up. The following fall risk precautions were continued: bed/ chair alarms, door signage, yellow bracelet and socks as well as update of the Earmon Pressman tool in the patient's room.    Freddy Score: 4    HEALS Safety Check    A safety check occurred in the patient's room between off going nurse and oncoming nurse listed above. The safety check included the below items  Area Items   H  High Alert Medications - Verify all high alert medication drips (heparin, PCA, etc.)   E  Equipment - Suction is set up for ALL patients (with adelaide)  - Red plugs utilized for all equipment (IV pumps, etc.)  - WOWs wiped down at end of shift.  - Room stocked with oxygen, suction, and other unit-specific supplies   A  Alarms - Bed alarm is set for fall risk patients  - Ensure chair alarm is in place and activated if patient is up in a chair   L  Lines - Check IV for any infiltration  - Cline bag is empty if patient has a Cline   - Tubing and IV bags are labeled   S  Safety   - Room is clean, patient is clean, and equipment is clean. - Hallways are clear from equipment besides carts. - Fall bracelet on for fall risk patients  - Ensure room is clear and free of clutter  - Suction is set up for ALL patients (with adelaide)  - Hallways are clear from equipment besides carts.    - Isolation precautions followed, supplies available outside room, sign posted     Gallo Sesay LPN

## 2021-11-24 NOTE — DIALYSIS
ACUTE HEMODIALYSIS FLOW SHEET    HEMODIALYSIS ORDERS: Physician: Dr. Gena Tyler     Dialyzer: Revaclear   Duration: 3 hr  BFR: 350   DFR: 600   Dialysate:  Temp 36   K+   2.     Ca+  2.5   Na 138   Bicarb 30   Wt Readings from Last 1 Encounters:   11/19/21 94.8 kg (209 lb 1.6 oz)    Patient Chart [x]   Unable to Obtain []  Dry weight/UF Goal: 2000 ml   Heparin []  Bolus    Units    [] Hourly    Units    [x]None       Pre BP:   90/45    Pulse:  69   Respirations: 18    Temperature:  98.3  [] Oral [] Axillary  Tx: NSS   ml/Bolus   [x] N/A   Labs: []  Pre  []  Post:   [x] N/A   Additional Orders(medications, blood products, hypotension management): [x] Yes   [] No     [x]  [facility] Consent Verified     GRAFT/FISTULA ACCESS:   []N/A     []Right     [x]Left     [x]UE     []LE   []AVG   [x]AVF     []Buttonhole    [x]Medical Aseptic Prep Utilized   [x]No S/S infection  []Redness  []Drainage []Cultured  [] Swelling  [] Pain  Bruit:   [x] Strong    [] Weak       Thrill :   [x] Strong    [] Weak     Needle Gauge: 15   Length: 1 inch   If access problem,  notified: []Yes     [x]N/A     Please describe access if present and not used: N/A       GENERAL ASSESSMENT:    LUNGS:   SaO2%      [x] Clear  [] Coarse  [] Crackles  [] Wheezing                                                [] Diminished     Location : []RLL   []LLL    []RUL  []LALI   Cough: []Productive  []Dry  [x]N/A   Respirations:  [x]Easy  []Labored   Therapy:  [x]RA  []NC l/min    Mask: []NRB  [] Venti    O2%                  []Ventilator  []Intubated  [] Trach  [] BiPaP   CARDIAC: [x]Regular      [] Irregular   [] Pericardial Rub  [] JVD          []  Monitored  [] Bedside  [] Remotely monitored     EDEMA: [x] None  []Generalized  [] Pitting [] 1    [] 2    [] 3    [] 4                 [] Facial  [] Pedal  []  UE  [] LE   SKIN:   [x] Warm  [] Hot     [] Cold   [x] Dry     [] Pale   [] Diaphoretic                  [] Flushed  [] Jaundiced  [] Cyanotic  [] Rash  [] Weeping   LOC:    [x] Alert      [x]Oriented:    [x] Person     [x] Place  [x]Time               [] Confused  [] Lethargic  [] Medicated  [] Non-responsive  [] Non-Verbal   GI / ABDOMEN:                     [] Flat    [] Distended    [x] Soft    [] Firm   []  Obese                   [] Diarrhea  [x] Bowel Sounds  [] Nausea  [] Vomiting     / URINE ASSESSMENT:                   [] Voiding   [] Oliguria  [x] Anuria   []  Cline                  [] Incontinent  []  Incontinent Brief []  Fecal Management System     PAIN:  [x] 0 []1  []2   []3   []4   []5   []6   []7   []8   []9   []10            Scale 0-10  Action/Follow Up:    MOBILITY:  [x] Bed    [] Stretcher      All Vitals and Treatment Details on Attached [address]: [facility]          Room # 178/01   [] 1st Time Acute      [] Stat       [x] Routine      [] Urgent     [x] Acute Room  []  Bedside  [] ICU/CCU  [] ER   Isolation Precautions:  [x] Dialysis    Contact       ALLERGIES:     Allergies   Allergen Reactions    Albumin, Human 25 % Itching     Headache - severe migraine like, itchy eyes, runny nose    Ciprofloxacin Hives    Cyclopentolate Unknown (comments)    Iron Sucrose Diarrhea    Statins-Hmg-Coa Reductase Inhibitors Other (comments)     Body ache      Code Status:  DNR     Hepatitis Status     Lab Results   Component Value Date/Time    Hepatitis B surface Ag <0.10 11/17/2021 02:20 PM    Hepatitis B surface Ab 29.33 11/17/2021 02:20 PM        Current Labs:      Lab Results   Component Value Date/Time    WBC 6.3 11/24/2021 05:39 AM    Hemoglobin, POC 8.8 (L) 09/24/2020 08:45 AM    HGB 8.8 (L) 11/24/2021 05:39 AM    Hematocrit, POC 26 (L) 09/24/2020 08:45 AM    HCT 29.4 (L) 11/24/2021 05:39 AM    PLATELET 401 26/39/0967 05:39 AM    .3 (H) 11/24/2021 05:39 AM     Lab Results   Component Value Date/Time    Sodium 137 11/24/2021 05:39 AM    Potassium 5.7 (H) 11/24/2021 05:39 AM    Chloride 101 11/24/2021 05:39 AM    CO2 25 11/24/2021 05:39 AM    Anion gap 11 11/24/2021 05:39 AM    Glucose 81 11/24/2021 05:39 AM    BUN 47 (H) 11/24/2021 05:39 AM    Creatinine 6.87 (H) 11/24/2021 05:39 AM    BUN/Creatinine ratio 7 (L) 11/24/2021 05:39 AM    GFR est AA 7 (L) 11/24/2021 05:39 AM    GFR est non-AA 6 (L) 11/24/2021 05:39 AM    Calcium 9.1 11/24/2021 05:39 AM          DIET:  DIET ADULT ORAL NUTRITION SUPPLEMENT  DIET ADULT      PRIMARY NURSE REPORT:   Pre Dialysis:  MILLER Trean     Time: 8806        EDUCATION:    [x] Patient [] Other           Knowledge Basis: []None [x]Minimal [] Substantial []Not able to assess at this time  Barriers to learning  [x]N/A  []Intubated/Ventilated  []Sedated   [] Access Care     [] S&S of infection  [] Fluid Management  [] K+   [x] Procedural    []Albumin   [] Medications   [] Tx Options   [] Transplant   [] Diet   [] Other   Teaching Tools:  [x] Explain  [] Demo  [] Handouts [] Video  Patient response: [x] Verbalized understanding  [] Teach back  [] Return demonstration   [] Requires follow up      [x]Time Out/Safety Check  [x] Extracorporeal Circuit Tested for integrity       [address] - Before each treatment:     Machine Number:                   [address]                                    [x] Unit Machine # 5 with centralized RO                                                                              Alarm Test:  Pass time 1463            [x] RO/Machine Log Complete    Machine Temp    36.0             Dialysate: pH  7.4    Conductivity: Meter 13.9     HD Machine  14.1     TCD: 13.8  Dialyzer Lot # [id number]     Blood Tubing Lot # [id number]   Saline Lot # [id number]     CHLORINE TESTING-Before each treatment and every 4 hours    Total Chlorine: [x] less than 0.1 ppm  Initial Time Check: 1300       4 Hr/2nd Check Time: 1700   (if greater than 0.1 ppm from Primary then every 30 minutes from Secondary)     TREATMENT INITIATION - with Dialysis Precautions:   [x] All Connections Secured              [x] Saline Line Double Clamped   [x] Venous Parameters Set               [x] Arterial Parameters Set    [x] Prime Given 250ml NSS              [x]Air Foam Detector Engaged      Treatment Initiation Note:  5169; Pt arrived to HD without any complaints. Pt A &O X 3, follows commands, no distress noted on RA. LUE AVF assessed no abnormalities noted, bruit and thrill strong. AVF accessed using  15G needles without any difficulty. Good flows achieved from both needles    1350;  Time out performed per policy, HD commenced, pt tolerated well. During Treatment Notes:  1400;HD in good progress;VSS. Vascular access visible with arterial and venous line connections intact. 1430;HD in good progress,VSS. Vascular access visible with arterial and venous line connections intact. 1500;HD in good progress,VSS. Vascular access visible with arterial and venous line connections intact. 1530;HD in good progress,VSS. Vascular access visible with arterial and venous line connections intact. 1600;HD in good progress,VSS. Vascular access visible with arterial and venous line connections intact. 1630;HD in good progress, VSS. Vascular access visible with arterial and venous line connections intact. 66 91 21; Dialysis treatment completed. Medication Dose Volume Route Time [facility] Nurse, Title   None     MACK Laboy, RN     Post Assessment  Dialyzer Cleared:[] Good [x] Fair  [] Poor  Blood processed:  61.9 L  UF Removed:  2000 Ml  Post /91  Pulse  62 Resp  18   Temp 97.7  [x] Oral [] Axillary Lungs: [] Clear                [x] No change from initial assessment   Post Tx Vascular Access: [] N/A  AVF/AVG: Bleeding stopped with  Arterial Pressure for 8 min   Venous Pressure for 8 min          Cardiac:  [] Regular   [] Irregular   Rhythm:  [] Monitored   [x] Not Monitored   Edema;     []    None;   []   Generalised   Skin:[x] Warm  [x] Dry [] Diaphoretic               [] Flushed  [] Pale [] Cyanotic Pain:  [x]0  []1 []2  []3 []4  []5  []6 []7 []8  []9  []10       Post Treatment Note:  1700; VSS. Arterial and venous needles removed and pressure applied until hemostasis achieved. Dry dressings applied. Bruit/Thrill present above and below dressings. Dressing dry , clean and intact        POST TREATMENT PRIMARY NURSE HANDOFF REPORT:   Post Dialysis:  MILLER Teran                Time:  1710       Abbreviations: AVG-arterial venous graft, AVF-arterial venous fistula, IJ-Internal Jugular, Subcl-Subclavian, Fem-Femoral, Tx-treatment, AP/HR-apical heart rate, DFR-dialysate flow rate, BFR-blood flow rate, AP-arterial pressure, -venous pressure, UF-ultrafiltrate, TMP-transmembrane pressure, [place]asper-Venous, Art-Arterial, RO-Reverse Osmosis

## 2021-11-24 NOTE — PROGRESS NOTES
[x] Psychology  [] Social Work [] Recreational Therapy    INTERVENTION  UNITS/TIME OF SERVICE   Assessment    Supportive Counseling  November 24, 2021   Orientation    Discharge Planning    Resource Linkage              Progress/Current Status    Patient seen for individual support  and follow up this date on ARU to gauge her integration into treatment milieu and help her to further understand the efficacy for acute rehabilitation. In fact, she reports that she is already [facility]" than she did on admit and seems to understand the cumulative, positive impact that therapy will provide her. She has been made aware of a tentative date for discharge and seems willing to remain in treatment as recommended and for as long as necessary to maximize her gains. As noted on admit, patient feels very well supported her son with whom she resides. She discussed in more detail the circumstances of her visiting her son several years ago and he telling her that he would not bring her back to her home in [place], as he wanted her to remain in [place] where she would have family support. Patient recalled the abrupt turn of events for her but, on reflection, she now admits that it was in her best interest.  In fact, she stated having awareness that it would have been difficult to remain alone in her own house in [place] but that she was not yet ready to initiate a relocation, herself. She now seems to be appreciative of her son's decision making, and continued support of her. Patient basically encouraged to continue to persevere in her therapy efforts on ARU and practice safety, as recommended.     Ava Sands, THE [facility] 11/24/2021 11:09 AM

## 2021-11-24 NOTE — PROGRESS NOTES
Problem: Risk for Spread of Infection  Goal: Prevent transmission of infectious organism to others  Description: Prevent the transmission of infectious organisms to other patients, staff members, and visitors. Outcome: Progressing Towards Goal     Problem: Pressure Injury - Risk of  Goal: *Prevention of pressure injury  Description: Document Giovany Scale and appropriate interventions in the flowsheet.   Outcome: Progressing Towards Goal  Note: Pressure Injury Interventions:  Sensory Interventions: Maintain/enhance activity level, Keep linens dry and wrinkle-free, Minimize linen layers, Assess changes in LOC, Use 30-degree side-lying position    Moisture Interventions: Absorbent underpads, Maintain skin hydration (lotion/cream), Minimize layers    Activity Interventions: Increase time out of bed, Pressure redistribution bed/mattress(bed type), Chair cushion, PT/OT evaluation    Mobility Interventions: Assess need for specialty bed, Chair cushion, HOB 30 degrees or less, Pressure redistribution bed/mattress (bed type), PT/OT evaluation    Nutrition Interventions: Document food/fluid/supplement intake

## 2021-11-24 NOTE — PROGRESS NOTES
RENAL DAILY PROGRESS NOTE              Subjective:       Complaint:   Seen in rehab ,no complaints    IMPRESSION:   IMPRESSION:   · esrd mwf dialysis  · Pelvic fx,non displaced  · A fib  · Chronic hypotension,on midodrine  · Secondary hyperparathyroidism  · Anemia of chronic disease  · ? Headaches related to albumin infusion      PLAN:   Seen during dialysis at 3 pm,bp is stable so far  D/c  albumin   Decrease allopurinol  Avoid Gadolinium due to its association with nephrogenic systemic fibrosis in a patients with severe ARF and ESRD.    Please dose all medications for creatinine clearance <15/dialysis.    Avoid blood pressure checks, blood draws, peripheral iv's on arm with access. AvoidPICC lines on either arm in order to preserve veins for dialysis access creation.               Current Facility-Administered Medications   Medication Dose Route Frequency    midodrine (PROAMATINE) tablet 5 mg  5 mg Oral TID WITH MEALS    allopurinoL (ZYLOPRIM) tablet 100 mg  100 mg Oral Q MON, WED & FRI    epoetin caitlin-epbx (RETACRIT) injection 8,000 Units  8,000 Units SubCUTAneous Q MON, WED & FRI    cetirizine (ZYRTEC) tablet 10 mg  10 mg Oral DAILY    acetaminophen (TYLENOL) tablet 650 mg  650 mg Oral Q4H PRN    bisacodyL (DULCOLAX) tablet 10 mg  10 mg Oral Q48H PRN    amiodarone (CORDARONE) tablet 200 mg  200 mg Oral DAILY    cefpodoxime (VANTIN) tablet 200 mg  200 mg Oral Q24H    acetaminophen (TYLENOL) tablet 650 mg  650 mg Oral TID    apixaban (ELIQUIS) tablet 5 mg  5 mg Oral BID    HYDROmorphone (DILAUDID) tablet 1 mg  1 mg Oral Q8H PRN    meclizine (ANTIVERT) tablet 12.5 mg  12.5 mg Oral TID PRN    DULoxetine (CYMBALTA) capsule 20 mg  20 mg Oral DAILY    vitamin B complex-folic acid 0.4 mg tablet  1 Tablet Oral DAILY    cyanocobalamin tablet 1,000 mcg  1,000 mcg Oral DAILY    L. acidophilus,casei,rhamnosus (BIO-K PLUS) capsule 1 Capsule  1 Capsule Oral DAILY    ascorbic acid (vitamin C) (VITAMIN C) tablet 500 mg  500 mg Oral DAILY    cholecalciferol (VITAMIN D3) (1000 Units /25 mcg) tablet 2,000 Units  2,000 Units Oral BID    latanoprost (XALATAN) 0.005 % ophthalmic solution 1 Drop  1 Drop Both Eyes QPM    levothyroxine (SYNTHROID) tablet 125 mcg  125 mcg Oral 6am    pantoprazole (PROTONIX) tablet 20 mg  20 mg Oral ACB    ondansetron (ZOFRAN ODT) tablet 4 mg  4 mg Oral TID PRN    lidocaine 4 % patch 1 Patch  1 Patch TransDERmal Q24H    gabapentin (NEURONTIN) capsule 200 mg  200 mg Oral Q MON, WED & FRI    doxercalciferoL (HECTOROL) 4 mcg/2 mL injection 4 mcg  4 mcg IntraVENous DIALYSIS MON, WED & FRI       Review of Symptoms: comprehensive ROS negative except above.    Objective:     Patient Vitals for the past 24 hrs:   Temp Pulse Resp BP SpO2   11/24/21 1500 -- 65 -- (!) 99/52 --   11/24/21 1445 -- 63 -- (!) 110/44 --   11/24/21 1430 -- 66 -- (!) 99/55 --   11/24/21 1415 -- 67 -- (!) 101/44 --   11/24/21 1400 -- 74 -- (!) 110/52 --   11/24/21 1350 -- 69 -- (!) 102/56 --   11/24/21 1340 98.3 °F (36.8 °C) 69 18 (!) 90/45 --   11/24/21 1240 -- 69 -- 115/60 --   11/24/21 0725 97.1 °F (36.2 °C) 70 18 116/61 97 %   11/23/21 2004 97.4 °F (36.3 °C) 95 18 129/73 93 %   11/23/21 1558 97.6 °F (36.4 °C) 82 19 (!) 120/53 92 %        Weight change:      11/22 1901 - 11/24 0700  In: 480 [P.O.:480]  Out: -     Intake/Output Summary (Last 24 hours) at 11/24/2021 1512  Last data filed at 11/24/2021 7949  Gross per 24 hour   Intake 600 ml   Output --   Net 600 ml     Physical Exam:   General: comfortable, no acute distress   HEENT sclera anicteric, supple neck, no thyromegaly  CVS: S1S2 heard,  no rub  RS: + air entry b/l,   Abd: Soft, Non tender, Not distended, Positive bowel sounds, no organomegaly, no CVA / supra pubic tenderness  Extrm: plus 1 edema, no cyanosis, clubbing   Skin: no visible  Rash  Musculoskeletal: No gross joints or bone deformities         Data Review:     LABS:   Hematology:   Recent Labs     11/24/21 0539 11/22/21 0527   WBC 6.3 10.1   HGB 8.8* 9.4*   HCT 29.4* 30.5*     Chemistry:   Recent Labs     11/24/21 0539 11/22/21 0528 11/22/21 0527   BUN 47*  --  58*   CREA 6.87*  --  7.52*   CA 9.1 9.4 9.1   ALB 3.4  --  3.6   K 5.7*  --  5.4     --  137     --  101   CO2 25  --  27   PHOS 8.1*  --  8.1*   GLU 81  --  106*            Procedures/imaging: see electronic medical records for all procedures, Xrays and details which were not copied into this note but were reviewed prior to creation of Plan          Assessment & Plan:       See above      Juan R Field MD  11/24/2021

## 2021-11-24 NOTE — PROGRESS NOTES
Problem: Risk for Spread of Infection  Goal: Prevent transmission of infectious organism to others  Description: Prevent the transmission of infectious organisms to other patients, staff members, and visitors. Outcome: Progressing Towards Goal     Problem: Falls - Risk of  Goal: *Absence of Falls  Description: Document [place] Fall Risk and appropriate interventions in the flowsheet. Outcome: Progressing Towards Goal  Note: Fall Risk Interventions:  Mobility Interventions: Assess mobility with egress test, Bed/chair exit alarm, Patient to call before getting OOB, PT Consult for mobility concerns, Utilize gait belt for transfers/ambulation    Mentation Interventions: Bed/chair exit alarm, Door open when patient unattended, Gait belt with transfers/ambulation, HELP ([address]) if available, Increase mobility, More frequent rounding, Room close to nurse's station    Medication Interventions: Evaluate medications/consider consulting pharmacy, Bed/chair exit alarm, Patient to call before getting OOB, Teach patient to arise slowly, Utilize gait belt for transfers/ambulation    Elimination Interventions: Call light in reach, Bed/chair exit alarm, Patient to call for help with toileting needs, Toileting schedule/hourly rounds, Urinal in reach    History of Falls Interventions: Bed/chair exit alarm, Door open when patient unattended, Room close to nurse's station, Utilize gait belt for transfer/ambulation, Assess for delayed presentation/identification of injury for 48 hrs (comment for end date)         Problem: Pressure Injury - Risk of  Goal: *Prevention of pressure injury  Description: Document Giovany Scale and appropriate interventions in the flowsheet.   Outcome: Progressing Towards Goal  Note: Pressure Injury Interventions:  Sensory Interventions: Assess changes in LOC, Assess need for specialty bed, Chair cushion, Float heels, Minimize linen layers, Pad between skin to skin, Pressure redistribution bed/mattress (bed type)    Moisture Interventions: Absorbent underpads, Apply protective barrier, creams and emollients, Assess need for specialty bed, Check for incontinence Q2 hours and as needed, Limit adult briefs, Maintain skin hydration (lotion/cream), Minimize layers, Moisture barrier, Offer toileting Q_hr    Activity Interventions: Assess need for specialty bed, Chair cushion, Increase time out of bed, Pressure redistribution bed/mattress(bed type), PT/OT evaluation    Mobility Interventions: Assess need for specialty bed, Float heels, Chair cushion, HOB 30 degrees or less, Pressure redistribution bed/mattress (bed type), PT/OT evaluation    Nutrition Interventions: Document food/fluid/supplement intake, Discuss nutritional consult with provider                     Problem: Pain  Goal: *Control of Pain  Outcome: Progressing Towards Goal

## 2021-11-24 NOTE — PROGRESS NOTES
Problem: Self Care Deficits Care Plan (Adult)  Goal: *Therapy Goal (Edit Goal, Insert Text)  Description: Long Term Goals (to be met upon discharge date) in order to increase pt's functional independence and safety, and decrease burden of care:  1. Pt will perform self-feeding with Mod Independent  2. Pt will perform grooming with Mod Independent  3. Pt will perform UB bathing with supervision  4. Pt will perform LB bathing with SBA  5. Pt will perform shower transfer with SBA  6. Pt will perform UB dressing with supervision  7. Pt will perform LB dressing with SBA  8. Pt will perform toileting task with SBA  9. Pt will perform toilet transfer with SBA  10. Pt will perform an IADL task while standing with SBA     Short Term Weekly Goals for (2021-2021) in order to increase pt's functional independence and safety, and decrease burden of care:  1. Pt will perform self-feeding with supervision  2. Pt will perform grooming with supervision  3. Pt will perform UB bathing with SBA  4. Pt will perform LB bathing with Min A  5. Pt will perform shower transfer with Min A  6. Pt will perform UB dressing with SBA  7. Pt will perform LB dressing with Min A  8. Pt will perform toileting task with Min A  9. Pt will perform toilet transfer with Min A  Occupational Therapy TREATMENT    Patient: Aashish Dang   64 y.o. Patient identified with name and : Yes     Date: 2021    First Tx Session  Time In: 0825  Time Out[de-identified] 900 (-4 units: attempted to recoup minutes in the PM  but pt getting ready for Dialysis)    Diagnosis: Multiple closed pelvic fractures without disruption of pelvic ring (HCC) [S32.82XA]   Precautions: Contact, Fall, Skin, PWB (50% Left LE)  Chart, occupational therapy assessment, plan of care, and goals were reviewed. Pain:  Pt reports 0/10 pain or discomfort prior to treatment. Pt reports 0/10 pain or discomfort post treatment.    Intervention Provided: N/A      SUBJECTIVE:   Patient stated I have to get up stairs.     OBJECTIVE DATA SUMMARY:     GROOMING Daily Assessment    Grooming  Grooming Assistance : 5 (Supervision) (setup EOB)     UPPER BODY BATHING Daily Assessment    Upper Body Bathing  Bathing Assistance, Upper: 5 (Supervision) (setup EOB)  Position Performed: Seated edge of bed     LOWER BODY BATHING Daily Assessment    Lower Body Bathing  Bathing Assistance, Lower : 5 (Supervision) (setup EOB)  Adaptive Equipment: Long handled sponge; Reacher  Position Performed: Seated edge of bed  Adaptive Equipment: Walker     UPPER BODY DRESSING Daily Assessment    Upper Body Dressing   Dressing Assistance : 5 (Supervision) (setup EOB)     LOWER BODY DRESSING Daily Assessment    Lower Body Dressing   Dressing Assistance : 5 (Stand-by assistance) (slight re ed on using reacher to thread LLE through pants.)  Leg Crossed Method Used: Yes  Position Performed: Seated edge of bed; Standing  Adaptive Equipment Used: Walker     MOBILITY/TRANSFERS Daily Assessment     Sit <> stand x2 and SPT transfer to w/c with S using RW.        ASSESSMENT:  Pt progressing with self care and functional transfers while maintaining weightbearing precautions. Progression toward goals:  [x]          Improving appropriately and progressing toward goals  []          Improving slowly and progressing toward goals  []          Not making progress toward goals and plan of care will be adjusted     PLAN:  Patient continues to benefit from skilled intervention to address the above impairments. Continue treatment per established plan of care. Discharge Recommendations:  Home Health with 24 hr care  Further Equipment Recommendations for Discharge:  bedside commode      Activity Tolerance:  Fair       Estimated LOS:    Please refer to the flowsheet for vital signs taken during this treatment.   After treatment:   [x]  Patient left in no apparent distress sitting up in chair   []  Patient left in no apparent distress in bed  [] Call bell left within reach  []  Nursing notified  []  Caregiver present  []  Bed alarm activated    COMMUNICATION/EDUCATION:   [] Home safety education was provided and the patient/caregiver indicated understanding. [x] Patient/family have participated as able in goal setting and plan of care. [] Patient/family agree to work toward stated goals and plan of care. [] Patient understands intent and goals of therapy, but is neutral about his/her participation. [] Patient is unable to participate in goal setting and plan of care.       Mercy Saldana, OT   Outcome: Progressing Towards Goal

## 2021-11-24 NOTE — PROGRESS NOTES
Problem: Falls - Risk of  Goal: *Absence of Falls  Description: Document Gus Ferrera Fall Risk and appropriate interventions in the flowsheet.   11/24/2021 1351 by Naty Lazaro LPN  Outcome: Progressing Towards Goal  Note: Fall Risk Interventions:  Mobility Interventions: Assess mobility with egress test, Bed/chair exit alarm, Patient to call before getting OOB, PT Consult for mobility concerns, Utilize gait belt for transfers/ambulation    Mentation Interventions: Bed/chair exit alarm, Door open when patient unattended, Gait belt with transfers/ambulation, HELP ([address]) if available, Increase mobility, More frequent rounding, Room close to nurse's station    Medication Interventions: Evaluate medications/consider consulting pharmacy, Bed/chair exit alarm, Patient to call before getting OOB, Teach patient to arise slowly, Utilize gait belt for transfers/ambulation    Elimination Interventions: Call light in reach, Bed/chair exit alarm, Patient to call for help with toileting needs, Toileting schedule/hourly rounds, Urinal in reach    History of Falls Interventions: Bed/chair exit alarm, Door open when patient unattended, Room close to nurse's station, Utilize gait belt for transfer/ambulation, Assess for delayed presentation/identification of injury for 48 hrs (comment for end date)      11/24/2021 1345 by Naty Lazaro LPN  Outcome: Progressing Towards Goal  Note: Fall Risk Interventions:  Mobility Interventions: Assess mobility with egress test, Bed/chair exit alarm, Patient to call before getting OOB, PT Consult for mobility concerns, Utilize gait belt for transfers/ambulation    Mentation Interventions: Bed/chair exit alarm, Door open when patient unattended, Gait belt with transfers/ambulation, HELP ([address]) if available, Increase mobility, More frequent rounding, Room close to nurse's station    Medication Interventions: Evaluate medications/consider consulting pharmacy, Bed/chair exit alarm, Patient to call before getting OOB, Teach patient to arise slowly, Utilize gait belt for transfers/ambulation    Elimination Interventions: Call light in reach, Bed/chair exit alarm, Patient to call for help with toileting needs, Toileting schedule/hourly rounds, Urinal in reach    History of Falls Interventions: Bed/chair exit alarm, Door open when patient unattended, Room close to nurse's station, Utilize gait belt for transfer/ambulation, Assess for delayed presentation/identification of injury for 48 hrs (comment for end date)         Problem: Pressure Injury - Risk of  Goal: *Prevention of pressure injury  Description: Document Giovany Scale and appropriate interventions in the flowsheet.   11/24/2021 1351 by Tyrese Byrd LPN  Outcome: Progressing Towards Goal  Note: Pressure Injury Interventions:  Sensory Interventions: Assess changes in LOC, Assess need for specialty bed, Chair cushion, Float heels, Minimize linen layers, Pad between skin to skin, Pressure redistribution bed/mattress (bed type)    Moisture Interventions: Absorbent underpads, Apply protective barrier, creams and emollients, Assess need for specialty bed, Check for incontinence Q2 hours and as needed, Limit adult briefs, Maintain skin hydration (lotion/cream), Minimize layers, Moisture barrier, Offer toileting Q_hr    Activity Interventions: Assess need for specialty bed, Chair cushion, Increase time out of bed, Pressure redistribution bed/mattress(bed type), PT/OT evaluation    Mobility Interventions: Assess need for specialty bed, Float heels, Chair cushion, HOB 30 degrees or less, Pressure redistribution bed/mattress (bed type), PT/OT evaluation    Nutrition Interventions: Document food/fluid/supplement intake, Discuss nutritional consult with provider                  11/24/2021 1342 by Tyrese Byrd LPN  Outcome: Progressing Towards Goal  Note: Pressure Injury Interventions:  Sensory Interventions: Assess changes in LOC, Assess need for specialty bed, Chair cushion, Float heels, Minimize linen layers, Pad between skin to skin, Pressure redistribution bed/mattress (bed type)    Moisture Interventions: Absorbent underpads, Apply protective barrier, creams and emollients, Assess need for specialty bed, Check for incontinence Q2 hours and as needed, Limit adult briefs, Maintain skin hydration (lotion/cream), Minimize layers, Moisture barrier, Offer toileting Q_hr    Activity Interventions: Assess need for specialty bed, Chair cushion, Increase time out of bed, Pressure redistribution bed/mattress(bed type), PT/OT evaluation    Mobility Interventions: Assess need for specialty bed, Float heels, Chair cushion, HOB 30 degrees or less, Pressure redistribution bed/mattress (bed type), PT/OT evaluation    Nutrition Interventions: Document food/fluid/supplement intake, Discuss nutritional consult with provider                     Problem: Patient Education: Go to Patient Education Activity  Goal: Patient/Family Education  11/24/2021 1351 by Naty Lazaro LPN  Outcome: Progressing Towards Goal  11/24/2021 1345 by Naty Lazaro LPN  Outcome: Progressing Towards Goal     Problem: Pain  Goal: *Control of Pain  11/24/2021 1351 by Naty Lazaro LPN  Outcome: Progressing Towards Goal  11/24/2021 1345 by Naty Lazaro LPN  Outcome: Progressing Towards Goal

## 2021-11-24 NOTE — ROUTINE PROCESS
SHIFT CHANGE NOTE FOR [facility]    Bedside and Verbal shift change report given to Lian Mathur (oncoming nurse) by Pearl Callaway, JOSE ANTONIO (offgoing nurse). Report included the following information SBAR, Kardex, MAR and Recent Results.     Situation:   Code Status: DNR   Hospital Day: 5   Problem List:   Hospital Problems  Date Reviewed: 11/23/2021          Codes Class Noted POA    * (Principal) Multiple closed pelvic fractures without disruption of pelvic ring ([address] ICD-10-CM: K83.36ZP  ICD-9-CM: 808.44  11/19/2021 Yes        Anticoagulated by anticoagulation treatment ICD-10-CM: Z79.01  ICD-9-CM: V58.61  Unknown Yes    Overview Signed 11/23/2021  9:49 AM by Annemarie Tran MD     On Apixaban             Paroxysmal atrial fibrillation (HCC) (Chronic) ICD-10-CM: I48.0  ICD-9-CM: 427.31  Unknown Yes        Closed fracture of left inferior pubic ramus, with routine healing, subsequent encounter ICD-10-CM: S32.592D  ICD-9-CM: V54.13  11/12/2021 Yes        Closed nondisplaced fracture of anterior wall of left acetabulum with routine healing ICD-10-CM: S32.415D  ICD-9-CM: V54.19  11/12/2021 Yes        Closed fracture of superior pubic ramus, left, with routine healing, subsequent encounter ICD-10-CM: S32.512D  ICD-9-CM: V54.19  11/12/2021 Yes        History of infection with vancomycin resistant Enterococcus (VRE) ICD-10-CM: Z86.19  ICD-9-CM: V12.09  10/8/2021 Yes    Overview Signed 11/23/2021  9:51 AM by Annemarie Tran MD     Urine culture (collected 10/8/2021, resulted 10/14/2021) yielded growth of >100,000 colonies/ml of Enterococcus faecalis RESISTANT to Ciprofloxacin, Levofloxacin, Tetracycline and Vancomycin             History of urinary tract infection ICD-10-CM: Z87.440  ICD-9-CM: V13.02  10/8/2021 Yes    Overview Signed 11/23/2021  9:52 AM by Annemarie Tran MD     Urine culture (collected 10/8/2021, resulted 10/14/2021) yielded growth of >100,000 colonies/ml of Enterococcus faecalis RESISTANT to Ciprofloxacin, Levofloxacin, Tetracycline and Vancomycin             Need for prophylactic isolation ICD-10-CM: Z41.8  ICD-9-CM: V07.0  10/8/2021 Yes    Overview Signed 11/23/2021  9:52 AM by Annemarie Tran MD     Urine culture (collected 10/8/2021, resulted 10/14/2021) yielded growth of >100,000 colonies/ml of Enterococcus faecalis RESISTANT to Ciprofloxacin, Levofloxacin, Tetracycline and Vancomycin             End-stage renal disease on hemodialysis (HCC) (Chronic) ICD-10-CM: N18.6, Z99.2  ICD-9-CM: 585.6, V45.11  Unknown Yes    Overview Signed 11/23/2021  9:56 AM by Annemarie Tran MD     HD at [facility] on MWF. 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) ICD-10-CM: E11.22, N18.6  ICD-9-CM: 250.40, 585.6  Unknown Yes    Overview Signed 11/23/2021  9:50 AM by Annemarie Tran MD     HbA1c (10/8/2021) = 4.6             Chronic hypotension (Chronic) ICD-10-CM: I95.89  ICD-9-CM: 458.1  Unknown Yes    Overview Signed 11/23/2021 10:01 AM by Annemarie Tran MD     On Midodrine                   Background:   Past Medical History:   Past Medical History:   Diagnosis Date    Anemia in end-stage renal disease ([facility]     Anticoagulated by anticoagulation treatment     On Apixaban    Chronic hypotension     On Midodrine    Chronic pain     Closed fracture of left inferior pubic ramus, with routine healing, subsequent encounter 11/12/2021    Closed fracture of superior pubic ramus, left, with routine healing, subsequent encounter 11/12/2021    Closed nondisplaced fracture of anterior wall of left acetabulum with routine healing 11/12/2021    Depression     End-stage renal disease on hemodialysis ([facility]     HD at [facility] on MWF.  Tel # [phone]    Gastroesophageal reflux disease     Glaucoma     History of acute pyelonephritis 2/20/2020    History of hydronephrosis 10/5/2021    History of infection with vancomycin resistant Enterococcus (VRE) 10/8/2021    Urine culture (collected 10/8/2021, resulted 10/14/2021) yielded growth of >100,000 colonies/ml of Enterococcus faecalis RESISTANT to Ciprofloxacin, Levofloxacin, Tetracycline and Vancomycin    History of kidney stones     History of recurrent urinary tract infection     History of sepsis 6/18/2021    History of septic shock 10/8/2021    History of urethral stricture     History of urinary tract infection 10/8/2021    Urine culture (collected 10/8/2021, resulted 10/14/2021) yielded growth of >100,000 colonies/ml of Enterococcus faecalis RESISTANT to Ciprofloxacin, Levofloxacin, Tetracycline and Vancomycin    Hyperlipidemia     Hypothyroidism     Lung mass     Need for prophylactic isolation 10/8/2021    Urine culture (collected 10/8/2021, resulted 10/14/2021) yielded growth of >100,000 colonies/ml of Enterococcus faecalis RESISTANT to Ciprofloxacin, Levofloxacin, Tetracycline and Vancomycin    Paroxysmal atrial fibrillation (HCC)     Secondary hyperparathyroidism of renal origin ([address]     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 [facility]     HbA1c (10/8/2021) = 4.6    Uric acid nephrolithiasis     Urinary incontinence         Assessment:   Changes in Assessment throughout shift: No change to previous assessment     Patient has a central line: no Reasons if yes: n/a  Insertion date:n/a Last dressing date:n/a   Patient has Chua Cath: no Reasons if yes: n/a   Insertion date:n/a  Shift chua care completed: N/A     Last Vitals:     Vitals:    11/23/21 0800 11/23/21 1212 11/23/21 1558 11/23/21 2004   BP: (!) 131/46 (!) 143/46 (!) 120/53 129/73   Pulse: (!) 53 85 82 95   Resp: 17 19 18   Temp: 97.5 °F (36.4 °C)  97.6 °F (36.4 °C) 97.4 °F (36.3 °C)   TempSrc:       SpO2: 99%  92% 93%   Height:            PAIN    Pain Assessment    Pain Intensity 1: 0 (11/24/21 0426) Pain Intensity 1: 2 (12/29/14 1105)    Pain Location 1: Groin Pain Location 1: Abdomen    Pain Intervention(s) 1: Medication (see MAR), Cold pack Pain Intervention(s) 1: Medication (see MAR)  Patient Stated Pain Goal: Unable to verbalize/indicate pain (asleep) Patient Stated Pain Goal: 0  o Intervention effective: yes  o Other actions taken for pain: Medication (see MAR); Cold pack     Skin Assessment  Skin color Skin Color: Appropriate for ethnicity  Condition/Temperature Skin Condition/Temp: Dry, Warm  Integrity Skin Integrity: Intact  Turgor Turgor: Non-tenting  Weekly Pressure Ulcer Documentation  Pressure  Injury Documentation: No Pressure Injury Noted-Pressure Ulcer Prevention Initiated  Wound Prevention & Protection Methods  Orientation of wound Orientation of Wound Prevention: Posterior  Location of Prevention Location of Wound Prevention: Buttocks, Sacrum/Coccyx  Dressing Present Dressing Present : No  Dressing Status    Wound Offloading Wound Offloading (Prevention Methods): Bed, pressure reduction mattress     INTAKE/OUPUT  Date 11/23/21 0700 - 11/24/21 0659 11/24/21 0700 - 11/25/21 0659   Shift [id number] [id number] 24 Hour Total 6768-6830 6758-4834 24 Hour Total   INTAKE   P.O. 240  240        P. O. 240  240      Shift Total 240  240      OUTPUT   Urine           Urine Occurrence(s) 0 x 1 x 1 x      Emesis/NG output           Emesis Occurrence(s) 0 x  0 x      Stool           Stool Occurrence(s) 2 x 1 x 3 x      Shift Total           240      Weight (kg)             Recommendations:  1. Patient needs and requests: pain management, assistance with ADL's, increase mobility    2. Pending tests/procedures: dialysis today     3. Functional Level/Equipment: Partial (one person) / Wheelchair    Fall Precautions:   Fall risk precautions were reinforced with the patient; she was instructed to call for help prior to getting up. The following fall risk precautions were continued: bed/ chair alarms, door signage, yellow bracelet and socks as well as update of the Leafy Campo tool in the patient's room. Freddy Score: 4    HEALS Safety Check    A safety check occurred in the patient's room between off going nurse and oncoming nurse listed above. The safety check included the below items  Area Items   H  High Alert Medications - Verify all high alert medication drips (heparin, PCA, etc.)   E  Equipment - Suction is set up for ALL patients (with adelaide)  - Red plugs utilized for all equipment (IV pumps, etc.)  - WOWs wiped down at end of shift.  - Room stocked with oxygen, suction, and other unit-specific supplies   A  Alarms - Bed alarm is set for fall risk patients  - Ensure chair alarm is in place and activated if patient is up in a chair   L  Lines - Check IV for any infiltration  - Cline bag is empty if patient has a Cline   - Tubing and IV bags are labeled   S  Safety   - Room is clean, patient is clean, and equipment is clean. - Hallways are clear from equipment besides carts. - Fall bracelet on for fall risk patients  - Ensure room is clear and free of clutter  - Suction is set up for ALL patients (with adelaide)  - Hallways are clear from equipment besides carts.    - Isolation precautions followed, supplies available outside room, sign posted     Dorothy Solares RN

## 2021-11-24 NOTE — PROGRESS NOTES
Problem: Mobility Impaired (Adult and Pediatric)  Goal: *Therapy Goal (Edit Goal, Insert Text)  Description: Physical Therapy Short Term Goals  Initiated 11/20/2021 and to be accomplished within 7 day(s) on 11/27/2021  1. Patient will move from supine to sit and sit to supine , scoot up and down, and roll side to side in bed with supervision/set-up. 2.  Patient will transfer from bed to chair and chair to bed with moderate assistance  using the least restrictive device, consistently maintaining TTWB left LE. 3.  Patient will perform sit to stand with minimal to moderate assistance without additional assistance left LE to consistently maintain TTWB. 4.  Patient will perform w/c mobility SBA level over even surfaces for at least 200 ft before fatiguing. 5.  Patient will be able to perform static standing for at least 2 minute duration with 1-2 UE support before needing seated rest, maintaining TTWB left LE appropriately. Physical Therapy Long Term Goals  Initiated 11/20/2021 and to be accomplished within 28 day(s) 12/18/2021  1. Patient will move from supine to sit and sit to supine , scoot up and down, and roll side to side in bed with modified independence. 2.  Patient will transfer from bed to chair and chair to bed with modified independence using the least restrictive device. 3.  Patient will perform sit to stand with supervision/set-up. 4.  Patient will perform gait training if and when appropriate based on ability to consistently maintain TTWB left LE. 5.  Patient will perform w/c mobility mod I over even surfaces for at least 200 ft  6. Patient will negotiate w/c ramp with distant supervision.        11/24/2021 1149 by Andrei Fonseca  Outcome: Progressing Towards Goal  PHYSICAL THERAPY TREATMENT    Patient: Aashish Dang (83 y.o. female)  Date: 11/24/2021  Diagnosis: Multiple closed pelvic fractures without disruption of pelvic ring (HCC) [S32.82XA] Multiple closed pelvic fractures without disruption of pelvic ring (HCC)  Precautions: Contact, Fall, Skin, PWB (50% Left LE)  Chart, physical therapy assessment, plan of care and goals were reviewed. Time In:1100  Time Out:1230    Patient seen for: AM; Balance activities; Gait training; Patient education; Therapeutic exercise; Transfer training; Wheelchair mobility    Pain:  Pt pain was reported as 0 pre-treatment. Pt pain was reported as 6, during session and 2 post-treatment. Intervention: Yes, patient stopped at nurses station for pain medication     Patient identified with name and :Yes    SUBJECTIVE:      I don't know why it hurts so bad when I try to start out in the mornings. Patient was educated regarding stiffness and that once joints of LE warm, the stiffness should subside. OBJECTIVE DATA SUMMARY:    Objective:       BED/MAT MOBILITY Daily Assessment     Rolling Right : 4 (Minimal assistance)  Rolling Left : 4 (Contact guard assistance)  Supine to Sit : 4 (Minimal assistance)  Sit to Supine : 4 (Minimal assistance) (with left LE on the bed)      TRANSFERS Daily Assessment     Transfer Type: Other  Other: stand step with RW  Transfer Assistance : 4 (Minimal assistance)  Sit to Stand Assistance: Minimal assistance  Car Transfers: Minimum assistance  Car Type: car simulator patient required minimal assistance with lifting of LLE into and out of car simulator and with anterior lift off from simulator. GAIT Daily Assessment    Gait Description (WDL)      Gait Abnormalities Decreased step clearance; Trendelenburg    Assistive device Walker, rolling    Ambulation assistance - level surface 4 (Minimal assistance)    Distance  (15, 13) 50 ft    Ambulation assistance- uneven surface  (NT)    Comments Patient requires minimal verbal cues during ambulation with step length and width. Patient required minimal assistance with left LE advancement at times 2/2 what she described as knee buckling and was witnessed as muscle fatigue. STEPS/STAIRS Daily Assessment     Steps/Stairs ambulated  (3-4 inch steps)    Assistance Required 3 (Moderate assistance) (maxA descending, 2/2 weakness and faituge)    Rail Use Both    Comments  Patient was challenged with stair negotiation and initially required increased safety cues, moderate assistance for ascending and maximum/moderate assistance once she began to descend. Patient became very anxious and required additional support of a therapist  on her right side for safety. Patient was educated during and after 1st trial to slow down her breathing as her increased respirations made activity more difficult to complete. Patient reported being aware of need to slow her breathing down for improved descending of steps. Patient was challenged with 2nd trial and she was able to perform ascending/descending with BHRs and with moderate to minimal assistance. Patient required verbal cues for safety and sequencing. Improved breathing made a difference with 2nd trial of stair negotiation. Curbs/Ramps  (NT)        BALANCE Daily Assessment     Sitting - Static: Good (unsupported)  Sitting - Dynamic: Fair (occasional)  Standing - Static: Fair  Standing - Dynamic : Impaired        WHEELCHAIR MOBILITY Daily Assessment     Able to Propel (ft):  (225 ft)  Functional Level:  (5)  Wheelchair Setup Assist Required : 5 (Supervision/setup)  Wheelchair Management: Manages left brake; Manages right brake        THERAPEUTIC EXERCISES Daily Assessment       Supine-BLEs; toe raises,SAQs QS, GS, heel slides, abduction x 15 reps each; 2 sets   AAROM LLE heel slides, abduction x 15 reps each; 2 sets. ASSESSMENT:  Patient is seen for deficits in strength, mobility, transfers, ambulation, safety and balance. Patient is making slow gains towards STGs and she was educated this session regarding healing process and pain management.  Patient reported fear of taking pain medication that is stronger than tylenol 2/2 not being awake enough to perform activities during therapy session. Patient was advised to discuss this with her nurse and MD for a better understanding of pain regiment to improve activity tolerance during therapy sessions. Improvement noted with sit to stand transfers and bed mobility requiring less assistance since beginning of week. Continue with skilled sessions to address current weakness and functional deficits. Continues to require education regarding unrealistic expectations and pressure that she places on herself. Progression toward goals:  []      Improving appropriately and progressing toward goals  [x]      Improving slowly and progressing toward goals  []      Not making progress toward goals and plan of care will be adjusted      PLAN:  Patient continues to benefit from skilled intervention to address the above impairments. Continue treatment per established plan of care. Discharge Recommendations:  HHPT and transition to OPPT  Further Equipment Recommendations for Discharge:  RW      Estimated Discharge Date: 12/17/2021    Activity Tolerance:   Fair tolerance to session  Please refer to the flowsheet for vital signs taken during this treatment.     After treatment:   [] Patient left in no apparent distress in bed  [x] Patient left in no apparent distress sitting up in chair  [] Patient left in no apparent distress in w/c mobilizing under own power  [] Patient left in no apparent distress dining area  [] Patient left in no apparent distress mobilizing under own power  [x] Call bell left within reach  [x] Nursing notified  [] Caregiver present  [] Bed alarm activated   [] Chair alarm activated      Radha Stephenson  11/24/2021

## 2021-11-24 NOTE — PROGRESS NOTES
[facility] PHYSICAL REHABILITATION  [address] [place] [address], [address]     INPATIENT REHABILITATION  DAILY PROGRESS NOTE     Date: 11/24/2021    Name: Vikram Cheney Age / Sex: 64 y.o. / female   CSN: [id number] MRN: [id number]   [facility] Date: 11/19/2021 Length of Stay: 5 days     Primary Rehab Diagnosis: Impaired Mobility and ADLs secondary to Multiple closed pelvic fractures (comminuted fracture involving the left anterior acetabular wall with involvement of the base of the left superior pubic ramus which are not significantly displaced; nondisplaced left inferior pubic ramus fracture)      Subjective:     Patient seen and examined. Blood pressure controlled. Patient's Complaint:   No significant medical complaints    Pain Control: stable, mild-to-moderate joint symptoms intermittently, reasonably well controlled by current meds      Objective:     Vital Signs:  Patient Vitals for the past 24 hrs:   BP Temp Pulse Resp SpO2   11/24/21 0725 116/61 97.1 °F (36.2 °C) 70 18 97 %   11/23/21 2004 129/73 97.4 °F (36.3 °C) 95 18 93 %   11/23/21 1558 (!) 120/53 97.6 °F (36.4 °C) 82 19 92 %        Physical Examination:  GENERAL SURVEY: Patient is awake, alert, oriented x 3, sitting comfortably on the chair, not in acute respiratory distress. HEENT: pink palpebral conjunctivae, anicteric sclerae, no nasoaural discharge, moist oral mucosa  NECK: supple, no jugular venous distention, no palpable lymph nodes  CHEST/LUNGS: symmetrical chest expansion, good air entry, clear breath sounds  HEART: adynamic precordium, good S1 S2, no S3, regular rhythm, no murmurs  ABDOMEN: obese, bowel sounds appreciated, soft, non-tender  EXTREMITIES: pink nailbeds, no edema, full and equal pulses, no calf tenderness   NEUROLOGICAL EXAM: The patient is awake, alert and oriented x3, able to answer questions fairly appropriately, able to follow 1 and 2 step commands. Able to tell time from the wall clock.   Cranial nerves II-XII are grossly intact. No gross sensory deficit. Motor strength is 4 to 4+/5 on BUE and RLE, 2+ to 3-/5 on the left hip, 3 to 3+/5 on the left knee and left ankle.        Current Medications:  Current Facility-Administered Medications   Medication Dose Route Frequency    midodrine (PROAMATINE) tablet 5 mg  5 mg Oral TID WITH MEALS    allopurinoL (ZYLOPRIM) tablet 100 mg  100 mg Oral Q MON, WED & FRI    epoetin caitlin-epbx (RETACRIT) injection 8,000 Units  8,000 Units SubCUTAneous Q MON, WED & FRI    cetirizine (ZYRTEC) tablet 10 mg  10 mg Oral DAILY    acetaminophen (TYLENOL) tablet 650 mg  650 mg Oral Q4H PRN    bisacodyL (DULCOLAX) tablet 10 mg  10 mg Oral Q48H PRN    amiodarone (CORDARONE) tablet 200 mg  200 mg Oral DAILY    cefpodoxime (VANTIN) tablet 200 mg  200 mg Oral Q24H    acetaminophen (TYLENOL) tablet 650 mg  650 mg Oral TID    apixaban (ELIQUIS) tablet 5 mg  5 mg Oral BID    HYDROmorphone (DILAUDID) tablet 1 mg  1 mg Oral Q8H PRN    meclizine (ANTIVERT) tablet 12.5 mg  12.5 mg Oral TID PRN    DULoxetine (CYMBALTA) capsule 20 mg  20 mg Oral DAILY    vitamin B complex-folic acid 0.4 mg tablet  1 Tablet Oral DAILY    cyanocobalamin tablet 1,000 mcg  1,000 mcg Oral DAILY    L. acidophilus,casei,rhamnosus (BIO-K PLUS) capsule 1 Capsule  1 Capsule Oral DAILY    ascorbic acid (vitamin C) (VITAMIN C) tablet 500 mg  500 mg Oral DAILY    cholecalciferol (VITAMIN D3) (1000 Units /25 mcg) tablet 2,000 Units  2,000 Units Oral BID    latanoprost (XALATAN) 0.005 % ophthalmic solution 1 Drop  1 Drop Both Eyes QPM    levothyroxine (SYNTHROID) tablet 125 mcg  125 mcg Oral 6am    pantoprazole (PROTONIX) tablet 20 mg  20 mg Oral ACB    ondansetron (ZOFRAN ODT) tablet 4 mg  4 mg Oral TID PRN    lidocaine 4 % patch 1 Patch  1 Patch TransDERmal Q24H    gabapentin (NEURONTIN) capsule 200 mg  200 mg Oral Q MON, WED & FRI    doxercalciferoL (HECTOROL) 4 mcg/2 mL injection 4 mcg  4 mcg IntraVENous DIALYSIS MON, WED & FRI       Allergies:   Allergies   Allergen Reactions    Albumin, Human 25 % Itching     Headache - severe migraine like, itchy eyes, runny nose    Ciprofloxacin Hives    Cyclopentolate Unknown (comments)    Iron Sucrose Diarrhea    Statins-Hmg-Coa Reductase Inhibitors Other (comments)     Body ache       Functional Progress:    PHYSICAL THERAPY    ON ADMISSION MOST RECENT   Wheelchair Mobility/Management  Able to Propel (ft): 230 feet  Functional Level: 4  Curbs/Ramps Assist Required (FIM Score): 0 (Not tested)  Wheelchair Setup Assist Required : 2 (Maximal assistance)  Wheelchair Management: Manages left brake, Manages right brake (assistance with armrests, footrests) Wheelchair Mobility/Management  Able to Propel (ft): 232 feet  Functional Level: 4  Curbs/Ramps Assist Required (FIM Score): 0 (Not tested)  Wheelchair Setup Assist Required : 2 (Maximal assistance)  Wheelchair Management: Manages left brake, Manages right brake     Gait  Amount of Assistance: 0 (Not tested)  Distance (ft): 0 Feet (ft)  Assistive Device:  (NT) Gait  Amount of Assistance: 4 (Minimal assistance)  Distance (ft):  (15, 13)  Assistive Device: Walker, rolling     Balance-Sitting/Standing  Sitting - Static: Good (unsupported)  Sitting - Dynamic: Good (unsupported)  Standing - Static: Fair, Occasional, Poor (using bilat UE support of RW, occasionally one UE support)  Standing - Dynamic : Impaired Balance-Sitting/Standing  Sitting - Static: Good (unsupported)  Sitting - Dynamic: Fair (occasional)  Standing - Static: Fair  Standing - Dynamic : Impaired     Bed/Mat Mobility  Rolling Right : 4 (Minimal assistance)  Rolling Left : 4 (Minimal assistance)  Supine to Sit : 3 (Moderate assistance) (flat head of bed, no railings, needing minimal trunk support)  Sit to Supine : 3 (Moderate assistance) (head of bed flat, no rails, assist w/ left LE and reposition) Bed/Mat Mobility  Rolling Right : 4 (Minimal assistance) (with use of bed rails)  Rolling Left : 4 (Contact guard assistance) (with use of bed rails)  Supine to Sit : 4 (Minimal assistance)  Sit to Supine : 5 (Supervision) (with increased time and use of bed rails)     Transfers  Transfer Type: SPT with walker (mod/max A due to loss of balance and needing recovery)  Other: lateral transfer with transfer board (needing mod/max A)  Transfer Assistance :  (mod/max A for stabilization and for maintaining TTWB left LE)  Sit to Stand Assistance: Moderate assistance (lifting assistance, support left LE to ensure TTWB)  Car Transfers: Not tested  Car Type: NT Transfers  Transfer Type: Other  Other: stand step with RW  Transfer Assistance : 4 (Minimal assistance)  Sit to Stand Assistance: Minimal assistance  Car Transfers: Minimum assistance  Car Type: car simulator     Steps or Stairs  Steps/Stairs Ambulated (#): 0  Level of Assist : 0 (Not tested)  Rail Use:  (NT) Steps or Stairs  Steps/Stairs Ambulated (#):  (3-4 inch steps)  Level of Assist : 3 (Moderate assistance) (maxA descending, 2/2 weakness and faituge)  Rail Use: Both         Lab/Data Review:  Recent Results (from the past 24 hour(s))   CBC WITH AUTOMATED DIFF    Collection Time: 11/24/21  5:39 AM   Result Value Ref Range    WBC 6.3 4.6 - 13.2 K/uL    RBC 2.93 (L) 4.20 - 5.30 M/uL    HGB 8.8 (L) 12.0 - 16.0 g/dL    HCT 29.4 (L) 35.0 - 45.0 %    .3 (H) 78.0 - 100.0 FL    MCH 30.0 24.0 - 34.0 PG    MCHC 29.9 (L) 31.0 - 37.0 g/dL    RDW 15.7 (H) 11.6 - 14.5 %    PLATELET 729 700 - 390 K/uL    MPV 9.6 9.2 - 11.8 FL    NRBC 0.0 0  WBC    ABSOLUTE NRBC 0.00 0.00 - 0.01 K/uL    NEUTROPHILS 44 40 - 73 %    LYMPHOCYTES 39 21 - 52 %    MONOCYTES 12 (H) 3 - 10 %    EOSINOPHILS 3 0 - 5 %    BASOPHILS 1 0 - 2 %    IMMATURE GRANULOCYTES 1 (H) 0.0 - 0.5 %    ABS. NEUTROPHILS 2.8 1.8 - 8.0 K/UL    ABS. LYMPHOCYTES 2.5 0.9 - 3.6 K/UL    ABS. MONOCYTES 0.8 0.05 - 1.2 K/UL    ABS. EOSINOPHILS 0.2 0.0 - 0.4 K/UL    ABS.  BASOPHILS 0.1 0.0 - 0.1 K/UL    ABS. IMM. GRANS. 0.1 (H) 0.00 - 0.04 K/UL    DF AUTOMATED     RENAL FUNCTION PANEL    Collection Time: 11/24/21  5:39 AM   Result Value Ref Range    Sodium 137 136 - 145 mmol/L    Potassium 5.7 (H) 3.5 - 5.5 mmol/L    Chloride 101 100 - 111 mmol/L    CO2 25 21 - 32 mmol/L    Anion gap 11 3.0 - 18 mmol/L    Glucose 81 74 - 99 mg/dL    BUN 47 (H) 7.0 - 18 MG/DL    Creatinine 6.87 (H) 0.6 - 1.3 MG/DL    BUN/Creatinine ratio 7 (L) 12 - 20      GFR est AA 7 (L) >60 ml/min/1.73m2    GFR est non-AA 6 (L) >60 ml/min/1.73m2    Calcium 9.1 8.5 - 10.1 MG/DL    Phosphorus 8.1 (H) 2.5 - 4.9 MG/DL    Albumin 3.4 3.4 - 5.0 g/dL       Assessment:     Primary Rehabilitation Diagnosis  1.  Impaired Mobility and ADLs  2. 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 ([facility]) E11.22, N18.6    Secondary hyperparathyroidism of renal origin ([facility] N25.81    Gastroesophageal reflux disease K21.9    History of recurrent urinary tract infection Z87.440    History of sepsis Z86.19    End-stage renal disease on hemodialysis ([facility]) N18.6, Z99.2    History of hydronephrosis Z87.448    History of septic shock Z86.19    Anticoagulated by anticoagulation treatment Z79.01    Paroxysmal atrial fibrillation ([facility]) I48.0    Closed fracture of left inferior pubic ramus, with routine healing, subsequent encounter S32.592D    Closed nondisplaced fracture of anterior wall of left acetabulum with routine healing S32.415D    Closed fracture of superior pubic ramus, left, with routine healing, subsequent encounter S32.512D    Multiple closed pelvic fractures without disruption of pelvic ring ([facility]) S32.82XA    Depression F32. A    History of urinary tract infection Z87.440    Need for prophylactic isolation Z41.8    Hyperlipidemia E78.5    Hypothyroidism E03.9    History of kidney stones Z87.442    Chronic pain G89.29    Lung mass R91.8    History of urethral stricture Z87.448    Uric acid nephrolithiasis N20.0    Urinary incontinence R32    Glaucoma H40.9       Plan:     1. Justification for continued stay: Good progression towards established rehabilitation goals. 2. Medical Issues being followed closely:    [x]  Fall and safety precautions     []  Wound Care     [x]  Bowel and Bladder Function     [x]  Fluid Electrolyte and Nutrition Balance     [x]  Pain Control      3. Issues that 24 hour rehabilitation nursing is following:    [x]  Fall and safety precautions     []  Wound Care     [x]  Bowel and Bladder Function     [x]  Fluid Electrolyte and Nutrition Balance     [x]  Pain Control      [x]  Assistance with and education on in-room safety with transfers to and from the bed, wheelchair, toilet and shower. 4. Acute rehabilitation plan of care:    [x]  Continue current care and rehab. [x]  Physical Therapy           [x]  Occupational Therapy           []  Speech Therapy     []  Hold Rehab until further notice     5. Medications:    [x]  MAR Reviewed     [x]  Continue Present Medications     6. DVT Prophylaxis:      []  Enoxaparin     []  Unfractionated Heparin     []  Warfarin     [x]  NOAC     []  AMELIE Stockings     []  Sequential Compression Device     []  None     7. Code status    []  Full code     []  Partial code     []  Do not intubate     [x]  Do not resuscitate     8.  Orders:   > Multiple closed pelvic fractures (comminuted fracture involving the left anterior acetabular wall with involvement of the base of the left superior pubic ramus which are not significantly displaced; nondisplaced left inferior pubic ramus fracture)   > Orthopedic surgery recommending non-surgical management with PT, pain control   > Partial (50%) weightbearing on the left lower extremity     > Urinary tract infection, History of right ureteral stent   > Urine culture (collected 11/16/2021, resulted 11/20/2021) yielded growth of >100,000 colonies/ml of Proteus mirabilis RESISTANT to Nitrofurantoin   > Started on IV ceftriaxone and transitioned to cefpodoxime   > On discharge, patient is to follow up with Urology (Dr. Maico Shore)   > Continue Cefpodoxime 200 mg PO q 24 hr (STOP DATE: 11/25/2021)     > History of VRE urinary tract infection, 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)   > Urine culture (collected 10/8/2021, resulted 10/14/2021) yielded growth of >100,000 colonies/ml of Enterococcus faecalis RESISTANT to Ciprofloxacin, Levofloxacin, Tetracycline and Vancomycin   > Contact isolation    > Paroxysmal atrial fibrillation, anticoagulated on Apixaban   > Anticoagulation    > Continue Apixaban 5 mg PO BID    > Rate-control    > None   > Maintenance of sinus rhythm    > Continue Amiodarone 200 mg PO once daily    > Glaucoma    > Continue Latanoprost 0.005% ophthalmic solution, 1 drop into each ete q PM    > Hypothyroidism   > TSH (11/13/2021) = 11.4   > TSH (11/22/2021) = 1.69   > Free T4 (11/22/2021) = 1.1   > Continue Levothyroxine 125 mcg PO once daily     > Chronic hypotension   > Decrease Midodrine from 10 mg to 5 mg PO TID with meals     > End-stage renal disease, on hemodialysis (Mon-Wed-Fri)   > Nephrology consult (Dr. 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 Allergy to albumin   > Will list as allergy per pt and daughter-in-law request.  Start antihistamine. Monitor.    > Hyperkalemia      11/24/21  0539 11/22/21  0527 11/19/21  0124 11/18/21  0143 11/17/21  0145 11/16/21  0232 11/15/21  0216 11/14/21  0424 11/13/21  0445 11/12/21  1520   K 5.7* 5.4 4.8 4.3 4.7 4.6 4.8 4.4 4.7 3.5      > Sodium zirconium cyclosilicate 10 grams Po x 1 dose today    > Analgesia / Chronic low back pain   > Continue:    > Acetaminophen 650 mg PO TID (8AM, 12PM, 4PM)    > Acetaminophen 650 mg PO q 4 hr PRN for pain level 4/10 or lesser (from 8PM to 4AM only)    > Duloxetine 20 mg PO once daily    > Gabapentin 200 mg PO q Mon-Wed-Fri    > Lidocaine 4% patch, 1 patch on affected area q 24 hr (12 hr on, 12 hr off)    > Hydromorphone 1 mg PO q 8 hr PRN for pain level 5/10 or greater     > Diet:   > Specifications: 3 carb choices (45 gm/meal); Low fat/Low cholesterol/High fiber/IRINEO; Low potassium (less than 3,000 mg/day); Low phosphorus (less than 1,000 mg/day)   > Solids (consistency): Regular   > Liquids (consistency): Thin   > Fluid restriction: None       9. Personal Protective Equipment (N95 face mask and eye goggles) was used while interacting with the patient. Patient was using a surgical mask. 10. Patient's progress in rehabilitation and medical issues discussed with the patient. All questions answered to the best of my ability. Care plan discussed with patient and nurse. 11. Total clinical care time is 30 minutes, including review of chart including all labs, radiology, past medical history, and discussion with patient. Greater than 50% of my time was spent in coordination of care and counseling.       Signed: Vilma Olivas MD    November 24, 2021

## 2021-11-25 PROCEDURE — 97116 GAIT TRAINING THERAPY: CPT

## 2021-11-25 PROCEDURE — 74011000250 HC RX REV CODE- 250: Performed by: INTERNAL MEDICINE

## 2021-11-25 PROCEDURE — 97530 THERAPEUTIC ACTIVITIES: CPT

## 2021-11-25 PROCEDURE — 74011250637 HC RX REV CODE- 250/637: Performed by: INTERNAL MEDICINE

## 2021-11-25 PROCEDURE — 74011250637 HC RX REV CODE- 250/637: Performed by: FAMILY MEDICINE

## 2021-11-25 PROCEDURE — 65310000000 HC RM PRIVATE REHAB

## 2021-11-25 RX ADMIN — MIDODRINE HYDROCHLORIDE 5 MG: 5 TABLET ORAL at 09:10

## 2021-11-25 RX ADMIN — AMIODARONE HYDROCHLORIDE 200 MG: 200 TABLET ORAL at 09:09

## 2021-11-25 RX ADMIN — MIDODRINE HYDROCHLORIDE 5 MG: 5 TABLET ORAL at 13:01

## 2021-11-25 RX ADMIN — Medication 500 MG: at 09:10

## 2021-11-25 RX ADMIN — HYDROMORPHONE HYDROCHLORIDE 1 MG: 2 TABLET ORAL at 00:11

## 2021-11-25 RX ADMIN — CHOLECALCIFEROL TAB 25 MCG (1000 UNIT) 2000 UNITS: 25 TAB at 17:34

## 2021-11-25 RX ADMIN — HYDROMORPHONE HYDROCHLORIDE 1 MG: 2 TABLET ORAL at 09:10

## 2021-11-25 RX ADMIN — APIXABAN 5 MG: 5 TABLET, FILM COATED ORAL at 09:10

## 2021-11-25 RX ADMIN — LATANOPROST 1 DROP: 50 SOLUTION OPHTHALMIC at 22:22

## 2021-11-25 RX ADMIN — CEFPODOXIME PROXETIL 200 MG: 200 TABLET, FILM COATED ORAL at 18:28

## 2021-11-25 RX ADMIN — ACETAMINOPHEN 650 MG: 325 TABLET ORAL at 17:34

## 2021-11-25 RX ADMIN — ACETAMINOPHEN 650 MG: 325 TABLET ORAL at 09:10

## 2021-11-25 RX ADMIN — DULOXETINE HYDROCHLORIDE 20 MG: 20 CAPSULE, DELAYED RELEASE ORAL at 09:10

## 2021-11-25 RX ADMIN — APIXABAN 5 MG: 5 TABLET, FILM COATED ORAL at 17:34

## 2021-11-25 RX ADMIN — CYANOCOBALAMIN TAB 1000 MCG 1000 MCG: 1000 TAB at 09:09

## 2021-11-25 RX ADMIN — MIDODRINE HYDROCHLORIDE 5 MG: 5 TABLET ORAL at 17:34

## 2021-11-25 RX ADMIN — LEVOTHYROXINE SODIUM 125 MCG: 125 TABLET ORAL at 06:51

## 2021-11-25 RX ADMIN — PANTOPRAZOLE SODIUM 20 MG: 20 TABLET, DELAYED RELEASE ORAL at 06:51

## 2021-11-25 RX ADMIN — Medication 1 CAPSULE: at 09:10

## 2021-11-25 RX ADMIN — Medication 1 TABLET: at 09:10

## 2021-11-25 RX ADMIN — ACETAMINOPHEN 650 MG: 325 TABLET ORAL at 13:01

## 2021-11-25 RX ADMIN — CETIRIZINE HYDROCHLORIDE 10 MG: 10 TABLET, FILM COATED ORAL at 09:10

## 2021-11-25 RX ADMIN — CHOLECALCIFEROL TAB 25 MCG (1000 UNIT) 2000 UNITS: 25 TAB at 09:10

## 2021-11-25 NOTE — ROUTINE PROCESS
SHIFT CHANGE NOTE FOR [facility]    Bedside and Verbal shift change report given to Cindy Huitron, JOSE ANTONIO (oncoming nurse) by Lj Nagel RN (offgoing nurse). Report included the following information SBAR, Kardex, MAR and Recent Results.     Situation:   Code Status: DNR   Hospital Day: 6   Problem List:   Hospital Problems  Date Reviewed: 11/25/2021          Codes Class Noted POA    * (Principal) Multiple closed pelvic fractures without disruption of pelvic ring ([facility] [address] ICD-10-CM: X65.66IK  ICD-9-CM: 808.44  11/19/2021 Yes        Anticoagulated by anticoagulation treatment ICD-10-CM: Z79.01  ICD-9-CM: V58.61  Unknown Yes    Overview Signed 11/23/2021  9:49 AM by Lincoln Dumont MD     On Apixaban             Paroxysmal atrial fibrillation (HCC) (Chronic) ICD-10-CM: I48.0  ICD-9-CM: 427.31  Unknown Yes        Closed fracture of left inferior pubic ramus, with routine healing, subsequent encounter ICD-10-CM: S32.592D  ICD-9-CM: V54.13  11/12/2021 Yes        Closed nondisplaced fracture of anterior wall of left acetabulum with routine healing ICD-10-CM: S32.415D  ICD-9-CM: V54.19  11/12/2021 Yes        Closed fracture of superior pubic ramus, left, with routine healing, subsequent encounter ICD-10-CM: S32.512D  ICD-9-CM: V54.19  11/12/2021 Yes        History of infection with vancomycin resistant Enterococcus (VRE) ICD-10-CM: Z86.19  ICD-9-CM: V12.09  10/8/2021 Yes    Overview Signed 11/23/2021  9:51 AM by Lincoln Dumont MD     Urine culture (collected 10/8/2021, resulted 10/14/2021) yielded growth of >100,000 colonies/ml of Enterococcus faecalis RESISTANT to Ciprofloxacin, Levofloxacin, Tetracycline and Vancomycin             History of urinary tract infection ICD-10-CM: Z87.440  ICD-9-CM: V13.02  10/8/2021 Yes    Overview Signed 11/23/2021  9:52 AM by Lincoln Dumont MD     Urine culture (collected 10/8/2021, resulted 10/14/2021) yielded growth of >100,000 colonies/ml of Enterococcus faecalis RESISTANT to Ciprofloxacin, Levofloxacin, Tetracycline and Vancomycin             Need for prophylactic isolation ICD-10-CM: Z41.8  ICD-9-CM: V07.0  10/8/2021 Yes    Overview Signed 11/23/2021  9:52 AM by Kostas Samuel MD     Urine culture (collected 10/8/2021, resulted 10/14/2021) yielded growth of >100,000 colonies/ml of Enterococcus faecalis RESISTANT to Ciprofloxacin, Levofloxacin, Tetracycline and Vancomycin             End-stage renal disease on hemodialysis (HCC) (Chronic) ICD-10-CM: N18.6, Z99.2  ICD-9-CM: 585.6, V45.11  Unknown Yes    Overview Signed 11/23/2021  9:56 AM by Kostas Samuel MD     HD at [facility] on MWF. 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 ([facility] [address] (Chronic) ICD-10-CM: E11.22, N18.6  ICD-9-CM: 250.40, 585.6  Unknown Yes    Overview Signed 11/23/2021  9:50 AM by Kostas Samuel MD     HbA1c (10/8/2021) = 4.6             Chronic hypotension (Chronic) ICD-10-CM: I95.89  ICD-9-CM: 458.1  Unknown Yes    Overview Signed 11/23/2021 10:01 AM by Kostas Samuel MD     On Midodrine                   Background:   Past Medical History:   Past Medical History:   Diagnosis Date    Anemia in end-stage renal disease ([facility] [address]     Anticoagulated by anticoagulation treatment     On Apixaban    Chronic hypotension     On Midodrine    Chronic pain     Closed fracture of left inferior pubic ramus, with routine healing, subsequent encounter 11/12/2021    Closed fracture of superior pubic ramus, left, with routine healing, subsequent encounter 11/12/2021    Closed nondisplaced fracture of anterior wall of left acetabulum with routine healing 11/12/2021    Depression     End-stage renal disease on hemodialysis ([facility] [address]     HD at [facility] on MWF.  Tel # [phone]    Gastroesophageal reflux disease     Glaucoma     History of acute pyelonephritis 2/20/2020    History of hydronephrosis 10/5/2021    History of infection with vancomycin resistant Enterococcus (VRE) 10/8/2021    Urine culture (collected 10/8/2021, resulted 10/14/2021) yielded growth of >100,000 colonies/ml of Enterococcus faecalis RESISTANT to Ciprofloxacin, Levofloxacin, Tetracycline and Vancomycin    History of kidney stones     History of recurrent urinary tract infection     History of sepsis 6/18/2021    History of septic shock 10/8/2021    History of urethral stricture     History of urinary tract infection 10/8/2021    Urine culture (collected 10/8/2021, resulted 10/14/2021) yielded growth of >100,000 colonies/ml of Enterococcus faecalis RESISTANT to Ciprofloxacin, Levofloxacin, Tetracycline and Vancomycin    Hyperlipidemia     Hypothyroidism     Lung mass     Need for prophylactic isolation 10/8/2021    Urine culture (collected 10/8/2021, resulted 10/14/2021) yielded growth of >100,000 colonies/ml of Enterococcus faecalis RESISTANT to Ciprofloxacin, Levofloxacin, Tetracycline and Vancomycin    Paroxysmal atrial fibrillation (HCC)     Secondary hyperparathyroidism of renal origin ([facility] [address]     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5 °F (36.4 °C) 97.1 °F (36.2 °C)  96.8 °F (36 °C)   TempSrc:       SpO2: 99% 100%  100%   Height:            PAIN    Pain Assessment    Pain Intensity 1: 0 (11/25/21 1548) Pain Intensity 1: 2 (12/29/14 1105)    Pain Location 1: Leg, Hip Pain Location 1: Abdomen    Pain Intervention(s) 1: Medication (see MAR) Pain Intervention(s) 1: Medication (see MAR)  Patient Stated Pain Goal: 0 Patient Stated Pain Goal: 0  o Intervention effective: yes  o Other actions taken for pain: Medication (see MAR)     Skin Assessment  Skin color Skin Color: Appropriate for ethnicity  Condition/Temperature Skin Condition/Temp: Dry, Warm  Integrity Skin Integrity: Intact  Turgor Turgor: Non-tenting  Weekly Pressure Ulcer Documentation  Pressure  Injury Documentation: No Pressure Injury Noted-Pressure Ulcer Prevention Initiated  Wound Prevention & Protection Methods  Orientation of wound Orientation of Wound Prevention: Posterior  Location of Prevention Location of Wound Prevention: Buttocks, Heel, Sacrum/Coccyx  Dressing Present Dressing Present : No  Dressing Status    Wound Offloading Wound Offloading (Prevention Methods): Bed, pressure redistribution/air     INTAKE/OUPUT  Date 11/24/21 0700 - 11/25/21 0659 11/25/21 0700 - 11/26/21 0659   Shift 0863-8948 6497-8586 24 Hour Total 5599-3494 6693-9813 24 Hour Total   INTAKE   P.O. 440  440 1320  1320     P. O. 440  440 1320  1320   Shift Total 440  440 1320  1320   OUTPUT   Urine           Urine Occurrence(s) 0 x 0 x 0 x 3 x  3 x   Stool           Stool Occurrence(s) 0 x 1 x 1 x 0 x  0 x   Dialysis 2000 2000        NET Fluid Removed (mL) 2000 2000      Shift Total 2000 2000      NET -1560  -1560 1320  1320   Weight (kg)             Recommendations:  1. Patient needs and requests: na    2. Pending tests/procedures: na     3. Functional Level/Equipment: Partial (one person) /      Fall Precautions:   Fall risk precautions were reinforced with the patient; she was instructed to call for help prior to getting up. The following fall risk precautions were continued: bed/ chair alarms, door signage, yellow bracelet and socks as well as update of the Loraine tool in the patient's room.    Freddy Score: 4    HEALS Safety Check    A safety check occurred in the patient's room between off going nurse and oncoming nurse listed above. The safety check included the below items  Area Items   H  High Alert Medications - Verify all high alert medication drips (heparin, PCA, etc.)   E  Equipment - Suction is set up for ALL patients (with adelaide)  - Red plugs utilized for all equipment (IV pumps, etc.)  - WOWs wiped down at end of shift.  - Room stocked with oxygen, suction, and other unit-specific supplies   A  Alarms - Bed alarm is set for fall risk patients  - Ensure chair alarm is in place and activated if patient is up in a chair   L  Lines - Check IV for any infiltration  - Cline bag is empty if patient has a Cline   - Tubing and IV bags are labeled   S  Safety   - Room is clean, patient is clean, and equipment is clean. - Hallways are clear from equipment besides carts. - Fall bracelet on for fall risk patients  - Ensure room is clear and free of clutter  - Suction is set up for ALL patients (with adelaide)  - Hallways are clear from equipment besides carts.    - Isolation precautions followed, supplies available outside room, sign posted     Sophia Strickland RN

## 2021-11-25 NOTE — PROGRESS NOTES
Problem: Risk for Spread of Infection  Goal: Prevent transmission of infectious organism to others  Description: Prevent the transmission of infectious organisms to other patients, staff members, and visitors. Outcome: Progressing Towards Goal     Problem: Patient Education:  Go to Education Activity  Goal: Patient/Family Education  Outcome: Progressing Towards Goal     Problem: Falls - Risk of  Goal: *Absence of Falls  Description: Document Addy Jackson Fall Risk and appropriate interventions in the flowsheet.   Outcome: Progressing Towards Goal  Note: Fall Risk Interventions:  Mobility Interventions: Assess mobility with egress test, Bed/chair exit alarm, Patient to call before getting OOB, PT Consult for mobility concerns, Utilize gait belt for transfers/ambulation    Mentation Interventions: Bed/chair exit alarm, Door open when patient unattended, Gait belt with transfers/ambulation, HELP ([address]) if available, Increase mobility, More frequent rounding, Room close to nurse's station    Medication Interventions: Evaluate medications/consider consulting pharmacy, Bed/chair exit alarm, Patient to call before getting OOB, Teach patient to arise slowly, Utilize gait belt for transfers/ambulation    Elimination Interventions: Call light in reach, Bed/chair exit alarm, Patient to call for help with toileting needs, Toileting schedule/hourly rounds, Urinal in reach    History of Falls Interventions: Bed/chair exit alarm, Door open when patient unattended, Room close to nurse's station, Utilize gait belt for transfer/ambulation, Assess for delayed presentation/identification of injury for 48 hrs (comment for end date)         Problem: Patient Education: Go to Patient Education Activity  Goal: Patient/Family Education  Outcome: Progressing Towards Goal     Problem: Pressure Injury - Risk of  Goal: *Prevention of pressure injury  Description: Document Giovany Scale and appropriate interventions in the flowsheet.   Outcome: Progressing Towards Goal  Note: Pressure Injury Interventions:  Sensory Interventions: Assess changes in LOC, Assess need for specialty bed, Chair cushion, Float heels, Minimize linen layers, Pad between skin to skin, Pressure redistribution bed/mattress (bed type)    Moisture Interventions: Absorbent underpads, Apply protective barrier, creams and emollients, Assess need for specialty bed, Check for incontinence Q2 hours and as needed, Limit adult briefs, Maintain skin hydration (lotion/cream), Minimize layers, Moisture barrier, Offer toileting Q_hr    Activity Interventions: Assess need for specialty bed, Chair cushion, Increase time out of bed, Pressure redistribution bed/mattress(bed type), PT/OT evaluation    Mobility Interventions: Assess need for specialty bed, Float heels, Chair cushion, HOB 30 degrees or less, Pressure redistribution bed/mattress (bed type), PT/OT evaluation    Nutrition Interventions: Document food/fluid/supplement intake, Discuss nutritional consult with provider                     Problem: Patient Education: Go to Patient Education Activity  Goal: Patient/Family Education  Outcome: Progressing Towards Goal     Problem: Pain  Goal: *Control of Pain  Outcome: Progressing Towards Goal     Problem: Patient Education: Go to Patient Education Activity  Goal: Patient/Family Education  Outcome: Progressing Towards Goal

## 2021-11-25 NOTE — PROGRESS NOTES
[facility]  [address] [place] [address], [address]     INPATIENT REHABILITATION  DAILY PROGRESS NOTE     Date: 11/25/2021    Name: Brando Barnes Age / Sex: 64 y.o. / female   CSN: [id number] MRN: [id number]   6 [facility] Date: 11/19/2021 Length of Stay: 6 days     Primary Rehab Diagnosis: Impaired Mobility and ADLs secondary to Multiple closed pelvic fractures (comminuted fracture involving the left anterior acetabular wall with involvement of the base of the left superior pubic ramus which are not significantly displaced; nondisplaced left inferior pubic ramus fracture)      Subjective:     No new issues or problems reported. Blood pressure controlled.        Objective:     Vital Signs:  Patient Vitals for the past 24 hrs:   BP Temp Temp src Pulse Resp SpO2   11/25/21 1151 129/75 -- -- 80 -- --   11/25/21 0741 128/73 97.1 °F (36.2 °C) -- 78 18 100 %   11/24/21 2053 (!) 114/54 97.5 °F (36.4 °C) -- 78 18 99 %   11/24/21 1721 (!) 112/91 97.7 °F (36.5 °C) Oral 62 18 --   11/24/21 1657 (!) 138/29 -- -- (!) 59 -- --   11/24/21 1645 (!) 111/56 -- -- 61 -- --   11/24/21 1630 (!) 112/58 -- -- (!) 58 -- --   11/24/21 1615 (!) 124/54 -- -- (!) 59 -- --   11/24/21 1600 126/62 -- -- 77 -- --   11/24/21 1545 (!) 129/52 -- -- (!) 59 -- --   11/24/21 1530 (!) 104/49 -- -- 60 -- --   11/24/21 1515 (!) 111/43 -- -- (!) 57 -- --   11/24/21 1500 (!) 99/52 -- -- 65 -- --   11/24/21 1445 (!) 110/44 -- -- 63 -- --   11/24/21 1430 (!) 99/55 -- -- 66 -- --   11/24/21 1415 (!) 101/44 -- -- 67 -- --   11/24/21 1400 (!) 110/52 -- -- 74 -- --   11/24/21 1350 (!) 102/56 -- -- 69 -- --   11/24/21 1340 (!) 90/45 98.3 °F (36.8 °C) Oral 69 18 --        Current Medications:  Current Facility-Administered Medications   Medication Dose Route Frequency    midodrine (PROAMATINE) tablet 5 mg  5 mg Oral TID WITH MEALS    allopurinoL (ZYLOPRIM) tablet 100 mg  100 mg Oral Q MON, WED & FRI    epoetin caitlin-epbx (RETACRIT) injection 8,000 Units  8,000 Units SubCUTAneous Q MON, WED & FRI    cetirizine (ZYRTEC) tablet 10 mg  10 mg Oral DAILY    acetaminophen (TYLENOL) tablet 650 mg  650 mg Oral Q4H PRN    bisacodyL (DULCOLAX) tablet 10 mg  10 mg Oral Q48H PRN    amiodarone (CORDARONE) tablet 200 mg  200 mg Oral DAILY    cefpodoxime (VANTIN) tablet 200 mg  200 mg Oral Q24H    acetaminophen (TYLENOL) tablet 650 mg  650 mg Oral TID    apixaban (ELIQUIS) tablet 5 mg  5 mg Oral BID    HYDROmorphone (DILAUDID) tablet 1 mg  1 mg Oral Q8H PRN    meclizine (ANTIVERT) tablet 12.5 mg  12.5 mg Oral TID PRN    DULoxetine (CYMBALTA) capsule 20 mg  20 mg Oral DAILY    vitamin B complex-folic acid 0.4 mg tablet  1 Tablet Oral DAILY    cyanocobalamin tablet 1,000 mcg  1,000 mcg Oral DAILY    L. acidophilus,casei,rhamnosus (BIO-K PLUS) capsule 1 Capsule  1 Capsule Oral DAILY    ascorbic acid (vitamin C) (VITAMIN C) tablet 500 mg  500 mg Oral DAILY    cholecalciferol (VITAMIN D3) (1000 Units /25 mcg) tablet 2,000 Units  2,000 Units Oral BID    latanoprost (XALATAN) 0.005 % ophthalmic solution 1 Drop  1 Drop Both Eyes QPM    levothyroxine (SYNTHROID) tablet 125 mcg  125 mcg Oral 6am    pantoprazole (PROTONIX) tablet 20 mg  20 mg Oral ACB    ondansetron (ZOFRAN ODT) tablet 4 mg  4 mg Oral TID PRN    lidocaine 4 % patch 1 Patch  1 Patch TransDERmal Q24H    gabapentin (NEURONTIN) capsule 200 mg  200 mg Oral Q MON, WED & FRI    doxercalciferoL (HECTOROL) 4 mcg/2 mL injection 4 mcg  4 mcg IntraVENous DIALYSIS MON, WED & FRI       Allergies:   Allergies   Allergen Reactions    Albumin, Human 25 % Itching     Headache - severe migraine like, itchy eyes, runny nose    Ciprofloxacin Hives    Cyclopentolate Unknown (comments)    Iron Sucrose Diarrhea    Statins-Hmg-Coa Reductase Inhibitors Other (comments)     Body ache       Functional Progress:    OCCUPATIONAL THERAPY    ON ADMISSION MOST RECENT   Eating  Functional Level: 5 Eating  Functional Level: 5     Grooming  Functional Level: 5   Grooming  Functional Level: 5     Bathing  Functional Level: 3   Bathing  Functional Level: 3     Upper Body Dressing  Functional Level: 4   Upper Body Dressing  Functional Level: 4     Lower Body Dressing  Functional Level: 3   Lower Body Dressing  Functional Level: 3     Toileting  Functional Level: 3   Toileting  Functional Level: 3     Toilet Transfers  Toilet Transfer Score: 4   Toilet Transfers  Toilet Transfer Score: 4     Tub /Shower Transfers  Tub/Shower Transfer Score: 4   Tub/Shower Transfers  Tub/Shower Transfer Score: 4       Legend:   7 - Independent   6 - Modified Independent   5 - Standby Assistance / Supervision / Set-up   4 - Minimum Assistance / Contact Guard Assistance   3 - Moderate Assistance   2 - Maximum Assistance   1 - Total Assistance / Dependent       Lab/Data Review:  No results found for this or any previous visit (from the past 24 hour(s)). Assessment:     Primary Rehabilitation Diagnosis  1.  Impaired Mobility and ADLs  2. 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 ([facility]) E11.22, N18.6    Secondary hyperparathyroidism of renal origin ([facility] N25.81    Gastroesophageal reflux disease K21.9    History of recurrent urinary tract infection Z87.440    History of sepsis Z86.19    End-stage renal disease on hemodialysis ([facility]) N18.6, Z99.2    History of hydronephrosis Z87.448    History of septic shock Z86.19    Anticoagulated by anticoagulation treatment Z79.01    Paroxysmal atrial fibrillation (HCC) I48.0    Closed fracture of left inferior pubic ramus, with routine healing, subsequent encounter S32.592D    Closed nondisplaced fracture of anterior wall of left acetabulum with routine healing S32.415D    Closed fracture of superior pubic ramus, left, with routine healing, subsequent encounter S32.512D    Multiple closed pelvic fractures without disruption of pelvic ring ([address] S32.82XA    Depression F32. A    History of urinary tract infection Z87.440    Need for prophylactic isolation Z41.8    Hyperlipidemia E78.5    Hypothyroidism E03.9    History of kidney stones Z87.442    Chronic pain G89.29    Lung mass R91.8    History of urethral stricture Z87.448    Uric acid nephrolithiasis N20.0    Urinary incontinence R32    Glaucoma H40.9       Plan:     1. Justification for continued stay: Good progression towards established rehabilitation goals. 2. Medical Issues being followed closely:    [x]  Fall and safety precautions     []  Wound Care     [x]  Bowel and Bladder Function     [x]  Fluid Electrolyte and Nutrition Balance     [x]  Pain Control      3. Issues that 24 hour rehabilitation nursing is following:    [x]  Fall and safety precautions     []  Wound Care     [x]  Bowel and Bladder Function     [x]  Fluid Electrolyte and Nutrition Balance     [x]  Pain Control      [x]  Assistance with and education on in-room safety with transfers to and from the bed, wheelchair, toilet and shower. 4. Acute rehabilitation plan of care:    [x]  Continue current care and rehab. [x]  Physical Therapy           [x]  Occupational Therapy           []  Speech Therapy     []  Hold Rehab until further notice     5. Medications:    [x]  MAR Reviewed     [x]  Continue Present Medications     6.  DVT Prophylaxis:      []  Enoxaparin     []  Unfractionated Heparin     []  Warfarin     [x]  NOAC []  AMELIE Stockings     []  Sequential Compression Device     []  None     7. Code status    []  Full code     []  Partial code     []  Do not intubate     [x]  Do not resuscitate     8.  Orders:   > Multiple closed pelvic fractures (comminuted fracture involving the left anterior acetabular wall with involvement of the base of the left superior pubic ramus which are not significantly displaced; nondisplaced left inferior pubic ramus fracture)   > Orthopedic surgery recommending non-surgical management with PT, pain control   > Partial (50%) weightbearing on the left lower extremity     > Urinary tract infection, History of right ureteral stent   > Urine culture (collected 11/16/2021, resulted 11/20/2021) yielded growth of >100,000 colonies/ml of Proteus mirabilis RESISTANT to Nitrofurantoin   > Started on IV ceftriaxone and transitioned to cefpodoxime   > On discharge, patient is to follow up with Urology (Dr. Lisy Reed)   > Continue Cefpodoxime 200 mg PO q 24 hr (STOP DATE: 11/25/2021)     > History of VRE urinary tract infection, 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)   > Urine culture (collected 10/8/2021, resulted 10/14/2021) yielded growth of >100,000 colonies/ml of Enterococcus faecalis RESISTANT to Ciprofloxacin, Levofloxacin, Tetracycline and Vancomycin   > Contact isolation    > Paroxysmal atrial fibrillation, anticoagulated on Apixaban   > Anticoagulation    > Continue Apixaban 5 mg PO BID    > Rate-control    > None   > Maintenance of sinus rhythm    > Continue Amiodarone 200 mg PO once daily    > Glaucoma    > Continue Latanoprost 0.005% ophthalmic solution, 1 drop into each ete q PM    > Hypothyroidism   > TSH (11/13/2021) = 11.4   > TSH (11/22/2021) = 1.69   > Free T4 (11/22/2021) = 1.1   > Continue Levothyroxine 125 mcg PO once daily     > Chronic hypotension   > On 11/23/2021, decreased Midodrine from 10 mg to 5 mg PO TID with meals   > Continue Midodrine 5 mg PO TID with meals     > End-stage renal disease, on hemodialysis (Mon-Wed-Fri)   > Nephrology consult (Dr. 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 Allergy to albumin   > Will list as allergy per pt and daughter-in-law request.  Start antihistamine. Monitor.    > Hyperkalemia      11/24/21  0539 11/22/21  0527 11/19/21  0124 11/18/21  0143 11/17/21  0145 11/16/21  0232 11/15/21  0216 11/14/21  0424 11/13/21  0445 11/12/21  1520   K 5.7* 5.4 4.8 4.3 4.7 4.6 4.8 4.4 4.7 3.5      > On 11/24/2021, patient was given Sodium zirconium cyclosilicate 10 grams PO x 1 dose    > Analgesia / Chronic low back pain   > Continue:    > Acetaminophen 650 mg PO TID (8AM, 12PM, 4PM)    > Acetaminophen 650 mg PO q 4 hr PRN for pain level 4/10 or lesser (from 8PM to 4AM only)    > Duloxetine 20 mg PO once daily    > Gabapentin 200 mg PO q Mon-Wed-Fri    > Lidocaine 4% patch, 1 patch on affected area q 24 hr (12 hr on, 12 hr off)    > Hydromorphone 1 mg PO q 8 hr PRN for pain level 5/10 or greater     > Diet:   > Specifications: 3 carb choices (45 gm/meal); Low fat/Low cholesterol/High fiber/IRINEO; Low potassium (less than 3,000 mg/day); Low phosphorus (less than 1,000 mg/day)   > Solids (consistency): Regular   > Liquids (consistency):  Thin   > Fluid restriction: None       Signed:    Claudia Kelly MD    November 25, 2021

## 2021-11-25 NOTE — ROUTINE PROCESS
0800 Pt. Awake sitting up in bed no change in assessment pt. Reported to be feeling fine. 0930 Pt. Sitting up in chair eating breakfast  1200 with therapy. 1330 able to transfer from bed to chair with assist,.  1500 no change in assessment. 1800 PT .sitting up in  Bed eating dinner.

## 2021-11-25 NOTE — PROGRESS NOTES
Problem: Mobility Impaired (Adult and Pediatric)  Goal: *Therapy Goal (Edit Goal, Insert Text)  Description: Physical Therapy Short Term Goals  Initiated 11/20/2021 and to be accomplished within 7 day(s) on 11/27/2021  1. Patient will move from supine to sit and sit to supine , scoot up and down, and roll side to side in bed with supervision/set-up. 2.  Patient will transfer from bed to chair and chair to bed with moderate assistance  using the least restrictive device, consistently maintaining TTWB left LE. 3.  Patient will perform sit to stand with minimal to moderate assistance without additional assistance left LE to consistently maintain TTWB. 4.  Patient will perform w/c mobility SBA level over even surfaces for at least 200 ft before fatiguing. 5.  Patient will be able to perform static standing for at least 2 minute duration with 1-2 UE support before needing seated rest, maintaining TTWB left LE appropriately. Physical Therapy Long Term Goals  Initiated 11/20/2021 and to be accomplished within 28 day(s) 12/18/2021  1. Patient will move from supine to sit and sit to supine , scoot up and down, and roll side to side in bed with modified independence. 2.  Patient will transfer from bed to chair and chair to bed with modified independence using the least restrictive device. 3.  Patient will perform sit to stand with supervision/set-up. 4.  Patient will perform gait training if and when appropriate based on ability to consistently maintain TTWB left LE. 5.  Patient will perform w/c mobility mod I over even surfaces for at least 200 ft  6. Patient will negotiate w/c ramp with distant supervision.        Outcome: Progressing Towards Goal   PHYSICAL THERAPY TREATMENT    Patient: Tommy Lopez [de-identified]64 y.o. female)  Date: 11/25/2021  Diagnosis: Multiple closed pelvic fractures without disruption of pelvic ring (HCC) [S32.82XA] Multiple closed pelvic fractures without disruption of pelvic ring [facility])  Precautions: Contact, Fall, Skin, PWB (50% Left LE)  Chart, physical therapy assessment, plan of care and goals were reviewed. Time In:930  Time Out:    Patient seen for: AM; Balance activities; Gait training; Patient education; Transfer training; Wheelchair mobility    Pain:  Pt pain was reported as 5/10 pre-treatment. Pt pain was reported as  4/10 post-treatment. Intervention: Nursing administered medication prior to session and patient responding to pain management    Patient identified with name and : Yes    SUBJECTIVE:    I feel as if I'm not in as much pain today. I did take a stronger pain medication that was offered so that I could do more in my session. OBJECTIVE DATA SUMMARY:    Objective:     GROSS ASSESSMENT Daily Assessment     AROM: Generally decreased, functional  Strength: Generally decreased, functional  Coordination: Within functional limits  Tone: Normal  Sensation: Intact        TRANSFERS Daily Assessment     Transfer Type: Other  Other: stand step with RW  Transfer Assistance : 4 (Contact guard assistance)  Sit to Stand Assistance: Supervision        GAIT Daily Assessment    Gait Description (WDL)      Gait Abnormalities Decreased step clearance; Trendelenburg    Assistive device Walker, rolling    Ambulation assistance - level surface 4 (Contact guard assistance)    Distance  (70 ft, 25 ft)    Ambulation assistance- uneven surface  (NA)    Comments Patient is making progress with decreased cues during ambulation regarding step length and width during gait training. Patient required rest break after 70 feet this session, however she was able to resume training with 2 minute rest session and completed another 25 ft. Which is an improvement from previous sessions. Patient appears more confident and positive regarding her daily gains.         STEPS/STAIRS Daily Assessment     Steps/Stairs ambulated  (6-4inch steps)    Assistance Required 4 (Contact guard assistance)    Rail Use Both    Comments      Curbs/Ramps 2 inch curb with RW and CGA while maintaining PWBing LLE         BALANCE Daily Assessment     Sitting - Static: Good (unsupported)  Sitting - Dynamic: Fair (occasional)  Standing - Static: Fair  Standing - Dynamic : Impaired        WHEELCHAIR MOBILITY Daily Assessment     Able to Propel (ft): 230 feet  Functional Level:  (5)  Curbs/Ramps Assist Required (FIM Score): 0 (Not tested)  Wheelchair Setup Assist Required : 5 (Supervision/setup)  Wheelchair Management: Manages left brake; Manages right brake        THERAPEUTIC EXERCISES Daily Assessment       Standing at RW with UE support; LLE-marches, ham curls, hip flexion, extension x 10 reps each; 2 sets. ASSESSMENT:  Patient is seen for deficits in strength, mobility, transfers, ambulation, stair negotiation and safety awareness. Patient is agreeable and enthusiastic the begin session this AM. Patient is making gains with confidence, pain management and safety awareness during sessions which has helped her work through some of her fears and is having a positive benefit on her rehab outcome. Continue to address deficits for improved functional mobility. Progression toward goals:  [x]      Improving appropriately and progressing toward goals  []      Improving slowly and progressing toward goals  []      Not making progress toward goals and plan of care will be adjusted      PLAN:  Patient continues to benefit from skilled intervention to address the above impairments. Continue treatment per established plan of care. Discharge Recommendations: HHPT and transition to OPPT  Further Equipment Recommendations for Discharge:  Patient has rollator and RW that she owns      Estimated Discharge Date:12/17/21    Activity Tolerance:   Patient tolerates session well. Please refer to the flowsheet for vital signs taken during this treatment.     After treatment:   [] Patient left in no apparent distress in bed  [x] Patient left in no apparent distress sitting up in chair  [] Patient left in no apparent distress in w/c mobilizing under own power  [] Patient left in no apparent distress dining area  [] Patient left in no apparent distress mobilizing under own power  [x] Call bell left within reach  [] Nursing notified  [] Caregiver present  [] Bed alarm activated   [] Chair alarm activated      Ishmael Bowden  11/25/2021

## 2021-11-25 NOTE — PROGRESS NOTES
SHIFT CHANGE NOTE FOR [facility]    Bedside and Verbal shift change report given to Enmanuel Piña, JOSE ANTONIO (oncoming nurse) by Rafael Live RN (offgoing nurse). Report included the following information SBAR, Kardex, MAR and Recent Results.     Situation:   Code Status: DNR   Hospital Day: 6   Problem List:   Hospital Problems  Date Reviewed: 11/24/2021          Codes Class Noted POA    * (Principal) Multiple closed pelvic fractures without disruption of pelvic ring ([facility] [address] ICD-10-CM: S31.92OD  ICD-9-CM: 808.44  11/19/2021 Yes        Anticoagulated by anticoagulation treatment ICD-10-CM: Z79.01  ICD-9-CM: V58.61  Unknown Yes    Overview Signed 11/23/2021  9:49 AM by Jennie Stout MD     On Apixaban             Paroxysmal atrial fibrillation (HCC) (Chronic) ICD-10-CM: I48.0  ICD-9-CM: 427.31  Unknown Yes        Closed fracture of left inferior pubic ramus, with routine healing, subsequent encounter ICD-10-CM: S32.592D  ICD-9-CM: V54.13  11/12/2021 Yes        Closed nondisplaced fracture of anterior wall of left acetabulum with routine healing ICD-10-CM: S32.415D  ICD-9-CM: V54.19  11/12/2021 Yes        Closed fracture of superior pubic ramus, left, with routine healing, subsequent encounter ICD-10-CM: S32.512D  ICD-9-CM: V54.19  11/12/2021 Yes        History of infection with vancomycin resistant Enterococcus (VRE) ICD-10-CM: Z86.19  ICD-9-CM: V12.09  10/8/2021 Yes    Overview Signed 11/23/2021  9:51 AM by Jennie Stout MD     Urine culture (collected 10/8/2021, resulted 10/14/2021) yielded growth of >100,000 colonies/ml of Enterococcus faecalis RESISTANT to Ciprofloxacin, Levofloxacin, Tetracycline and Vancomycin             History of urinary tract infection ICD-10-CM: Z87.440  ICD-9-CM: V13.02  10/8/2021 Yes    Overview Signed 11/23/2021  9:52 AM by Jennie Stout MD     Urine culture (collected 10/8/2021, resulted 10/14/2021) yielded growth of >100,000 colonies/ml of Enterococcus faecalis RESISTANT to Ciprofloxacin, Levofloxacin, Tetracycline and Vancomycin             Need for prophylactic isolation ICD-10-CM: Z41.8  ICD-9-CM: V07.0  10/8/2021 Yes    Overview Signed 11/23/2021  9:52 AM by Logan Romero MD     Urine culture (collected 10/8/2021, resulted 10/14/2021) yielded growth of >100,000 colonies/ml of Enterococcus faecalis RESISTANT to Ciprofloxacin, Levofloxacin, Tetracycline and Vancomycin             End-stage renal disease on hemodialysis (HCC) (Chronic) ICD-10-CM: N18.6, Z99.2  ICD-9-CM: 585.6, V45.11  Unknown Yes    Overview Signed 11/23/2021  9:56 AM by Logan Roemro MD     HD at [address] [place] [address] on MWF. 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 ([facility] (Chronic) ICD-10-CM: E11.22, N18.6  ICD-9-CM: 250.40, 585.6  Unknown Yes    Overview Signed 11/23/2021  9:50 AM by Logan Romero MD     HbA1c (10/8/2021) = 4.6             Chronic hypotension (Chronic) ICD-10-CM: I95.89  ICD-9-CM: 458.1  Unknown Yes    Overview Signed 11/23/2021 10:01 AM by Logan Romero MD     On Midodrine                   Background:   Past Medical History:   Past Medical History:   Diagnosis Date    Anemia in end-stage renal disease ([facility] [address]     Anticoagulated by anticoagulation treatment     On Apixaban    Chronic hypotension     On Midodrine    Chronic pain     Closed fracture of left inferior pubic ramus, with routine healing, subsequent encounter 11/12/2021    Closed fracture of superior pubic ramus, left, with routine healing, subsequent encounter 11/12/2021    Closed nondisplaced fracture of anterior wall of left acetabulum with routine healing 11/12/2021    Depression     End-stage renal disease on hemodialysis ([facility] [address]     HD at [address] [place] [address] on MWF.  Tel # [phone]    Gastroesophageal reflux disease     Glaucoma     History of acute pyelonephritis 2/20/2020    History of hydronephrosis 10/5/2021    History of infection with vancomycin resistant Enterococcus (VRE) 10/8/2021    Urine culture (collected 10/8/2021, resulted 10/14/2021) yielded growth of >100,000 colonies/ml of Enterococcus faecalis RESISTANT to Ciprofloxacin, Levofloxacin, Tetracycline and Vancomycin    History of kidney stones     History of recurrent urinary tract infection     History of sepsis 6/18/2021    History of septic shock 10/8/2021    History of urethral stricture     History of urinary tract infection 10/8/2021    Urine culture (collected 10/8/2021, resulted 10/14/2021) yielded growth of >100,000 colonies/ml of Enterococcus faecalis RESISTANT to Ciprofloxacin, Levofloxacin, Tetracycline and Vancomycin    Hyperlipidemia     Hypothyroidism     Lung mass     Need for prophylactic isolation 10/8/2021    Urine culture (collected 10/8/2021, resulted 10/14/2021) yielded growth of >100,000 colonies/ml of Enterococcus faecalis RESISTANT to Ciprofloxacin, Levofloxacin, Tetracycline and Vancomycin    Paroxysmal atrial fibrillation (HCC)     Secondary hyperparathyroidism of renal origin ([facility] [address]     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 ([facility] [address]     HbA1c (10/8/2021) = 4.6    Uric acid nephrolithiasis     Urinary incontinence         Assessment:   Changes in Assessment throughout shift:       Patient has a central line: no Reasons if yes: Insertion date: Last dressing date:   Patient has Cline Cath: no Reasons if yes:     Insertion date:       Last Vitals:     Vitals:    11/24/21 1645 11/24/21 1657 11/24/21 1721 11/24/21 2053   BP: (!) 111/56 (!) 138/29 (!) 112/91 (!) 114/54   Pulse: 61 (!) 59 62 78   Resp:   18 18   Temp:   97.7 °F (36.5 °C) 97.5 °F (36.4 °C)   TempSrc:   Oral    SpO2:    99%   Height:            PAIN    Pain Assessment    Pain Intensity 1: 0 (11/25/21 0400) Pain Intensity 1: 2 (12/29/14 1105)    Pain Location 1: Groin Pain Location 1: Abdomen    Pain Intervention(s) 1: Medication (see MAR) Pain Intervention(s) 1: Medication (see MAR)  Patient Stated Pain Goal: 0 Patient Stated Pain Goal: 0  o Intervention effective: yes  o Other actions taken for pain: Medication (see MAR)     Skin Assessment  Skin color Skin Color: Appropriate for ethnicity  Condition/Temperature Skin Condition/Temp: Dry, Warm  Integrity Skin Integrity: Intact  Turgor Turgor: Non-tenting  Weekly Pressure Ulcer Documentation  Pressure  Injury Documentation: No Pressure Injury Noted-Pressure Ulcer Prevention Initiated  Wound Prevention & Protection Methods  Orientation of wound Orientation of Wound Prevention: Posterior  Location of Prevention Location of Wound Prevention: Buttocks, Heel, Sacrum/Coccyx  Dressing Present Dressing Present : No  Dressing Status    Wound Offloading Wound Offloading (Prevention Methods): Bed, pressure redistribution/air     INTAKE/OUPUT  Date 11/24/21 0700 - 11/25/21 0659 11/25/21 0700 - 11/26/21 0659   Shift 8216-0701 5310-0716 24 Hour Total 4175-7056 1951-6405 24 Hour Total   INTAKE   P.O. 440  440        P. O. 440  440      Shift Total 440  440      OUTPUT   Urine           Urine Occurrence(s) 0 x  0 x      Stool           Stool Occurrence(s) 0 x  0 x      Dialysis 2000 2000        NET Fluid Removed (mL) 2000 2000      Shift Total 2000 2000      NET -1560  -1560      Weight (kg)             Recommendations:  1. Patient needs and requests: TOILETING    2. Pending tests/procedures: LABS PENDING     3. Functional Level/Equipment:   / Bed (comment)    Fall Precautions:   Fall risk precautions were reinforced with the patient; she was instructed to call for help prior to getting up. The following fall risk precautions were continued: bed/ chair alarms, door signage, yellow bracelet and socks as well as update of the Renetta Santos tool in the patient's room. Freddy Score: 4    HEALS Safety Check    A safety check occurred in the patient's room between off going nurse and oncoming nurse listed above.     The safety check included the below items  Area Items   H  High Alert Medications - Verify all high alert medication drips (heparin, PCA, etc.)   E  Equipment - Suction is set up for ALL patients (with adelaide)  - Red plugs utilized for all equipment (IV pumps, etc.)  - WOWs wiped down at end of shift.  - Room stocked with oxygen, suction, and other unit-specific supplies   A  Alarms - Bed alarm is set for fall risk patients  - Ensure chair alarm is in place and activated if patient is up in a chair   L  Lines - Check IV for any infiltration  - Cline bag is empty if patient has a Cline   - Tubing and IV bags are labeled   S  Safety   - Room is clean, patient is clean, and equipment is clean. - Hallways are clear from equipment besides carts. - Fall bracelet on for fall risk patients  - Ensure room is clear and free of clutter  - Suction is set up for ALL patients (with adelaide)  - Hallways are clear from equipment besides carts.    - Isolation precautions followed, supplies available outside room, sign posted     Emil Willams RN

## 2021-11-26 PROBLEM — E83.39 HYPERPHOSPHATEMIA: Status: ACTIVE | Noted: 2021-11-14

## 2021-11-26 LAB
ALBUMIN SERPL-MCNC: 3.6 G/DL (ref 3.4–5)
ANION GAP SERPL CALC-SCNC: 6 MMOL/L (ref 3–18)
BASOPHILS # BLD: 0.1 K/UL (ref 0–0.1)
BASOPHILS NFR BLD: 1 % (ref 0–2)
BUN SERPL-MCNC: 48 MG/DL (ref 7–18)
BUN/CREAT SERPL: 8 (ref 12–20)
CALCIUM SERPL-MCNC: 9.4 MG/DL (ref 8.5–10.1)
CHLORIDE SERPL-SCNC: 102 MMOL/L (ref 100–111)
CO2 SERPL-SCNC: 28 MMOL/L (ref 21–32)
CREAT SERPL-MCNC: 6.38 MG/DL (ref 0.6–1.3)
DIFFERENTIAL METHOD BLD: ABNORMAL
EOSINOPHIL # BLD: 0.2 K/UL (ref 0–0.4)
EOSINOPHIL NFR BLD: 2 % (ref 0–5)
ERYTHROCYTE [DISTWIDTH] IN BLOOD BY AUTOMATED COUNT: 16 % (ref 11.6–14.5)
GLUCOSE SERPL-MCNC: 101 MG/DL (ref 74–99)
HCT VFR BLD AUTO: 32.4 % (ref 35–45)
HGB BLD-MCNC: 9.7 G/DL (ref 12–16)
IMM GRANULOCYTES # BLD AUTO: 0.1 K/UL (ref 0–0.04)
IMM GRANULOCYTES NFR BLD AUTO: 1 % (ref 0–0.5)
LYMPHOCYTES # BLD: 3.3 K/UL (ref 0.9–3.6)
LYMPHOCYTES NFR BLD: 41 % (ref 21–52)
MCH RBC QN AUTO: 30.3 PG (ref 24–34)
MCHC RBC AUTO-ENTMCNC: 29.9 G/DL (ref 31–37)
MCV RBC AUTO: 101.3 FL (ref 78–100)
MONOCYTES # BLD: 0.7 K/UL (ref 0.05–1.2)
MONOCYTES NFR BLD: 9 % (ref 3–10)
NEUTS SEG # BLD: 3.7 K/UL (ref 1.8–8)
NEUTS SEG NFR BLD: 47 % (ref 40–73)
NRBC # BLD: 0 K/UL (ref 0–0.01)
NRBC BLD-RTO: 0 PER 100 WBC
PHOSPHATE SERPL-MCNC: 7.2 MG/DL (ref 2.5–4.9)
PLATELET # BLD AUTO: 402 K/UL (ref 135–420)
PMV BLD AUTO: 9.3 FL (ref 9.2–11.8)
POTASSIUM SERPL-SCNC: 5.1 MMOL/L (ref 3.5–5.5)
RBC # BLD AUTO: 3.2 M/UL (ref 4.2–5.3)
SODIUM SERPL-SCNC: 136 MMOL/L (ref 136–145)
WBC # BLD AUTO: 8 K/UL (ref 4.6–13.2)

## 2021-11-26 PROCEDURE — 97530 THERAPEUTIC ACTIVITIES: CPT

## 2021-11-26 PROCEDURE — 74011250637 HC RX REV CODE- 250/637: Performed by: INTERNAL MEDICINE

## 2021-11-26 PROCEDURE — 36415 COLL VENOUS BLD VENIPUNCTURE: CPT

## 2021-11-26 PROCEDURE — 74011250637 HC RX REV CODE- 250/637: Performed by: FAMILY MEDICINE

## 2021-11-26 PROCEDURE — 80069 RENAL FUNCTION PANEL: CPT

## 2021-11-26 PROCEDURE — 74011250636 HC RX REV CODE- 250/636: Performed by: INTERNAL MEDICINE

## 2021-11-26 PROCEDURE — 97110 THERAPEUTIC EXERCISES: CPT

## 2021-11-26 PROCEDURE — 99232 SBSQ HOSP IP/OBS MODERATE 35: CPT | Performed by: INTERNAL MEDICINE

## 2021-11-26 PROCEDURE — 90935 HEMODIALYSIS ONE EVALUATION: CPT

## 2021-11-26 PROCEDURE — 85025 COMPLETE CBC W/AUTO DIFF WBC: CPT

## 2021-11-26 PROCEDURE — 74011000250 HC RX REV CODE- 250: Performed by: INTERNAL MEDICINE

## 2021-11-26 PROCEDURE — 97116 GAIT TRAINING THERAPY: CPT

## 2021-11-26 PROCEDURE — 65310000000 HC RM PRIVATE REHAB

## 2021-11-26 RX ORDER — CALCIUM ACETATE 667 MG/1
1 CAPSULE ORAL
Status: DISCONTINUED | OUTPATIENT
Start: 2021-11-26 | End: 2021-11-29

## 2021-11-26 RX ORDER — LIDOCAINE AND PRILOCAINE 25; 25 MG/G; MG/G
CREAM TOPICAL 2 TIMES DAILY
Status: DISCONTINUED | OUTPATIENT
Start: 2021-11-27 | End: 2021-12-04 | Stop reason: HOSPADM

## 2021-11-26 RX ORDER — CALCIUM ACETATE 667 MG/1
1 TABLET ORAL
Status: DISCONTINUED | OUTPATIENT
Start: 2021-11-26 | End: 2021-11-26

## 2021-11-26 RX ADMIN — CYANOCOBALAMIN TAB 1000 MCG 1000 MCG: 1000 TAB at 08:01

## 2021-11-26 RX ADMIN — Medication 1 TABLET: at 08:01

## 2021-11-26 RX ADMIN — ACETAMINOPHEN 650 MG: 325 TABLET ORAL at 00:32

## 2021-11-26 RX ADMIN — DOXERCALCIFEROL 4 MCG: 4 INJECTION, SOLUTION INTRAVENOUS at 16:14

## 2021-11-26 RX ADMIN — CHOLECALCIFEROL TAB 25 MCG (1000 UNIT) 2000 UNITS: 25 TAB at 08:01

## 2021-11-26 RX ADMIN — HYDROMORPHONE HYDROCHLORIDE 1 MG: 2 TABLET ORAL at 02:40

## 2021-11-26 RX ADMIN — CHOLECALCIFEROL TAB 25 MCG (1000 UNIT) 2000 UNITS: 25 TAB at 17:29

## 2021-11-26 RX ADMIN — CALCIUM ACETATE 667 MG: 667 CAPSULE ORAL at 17:29

## 2021-11-26 RX ADMIN — PANTOPRAZOLE SODIUM 20 MG: 20 TABLET, DELAYED RELEASE ORAL at 06:48

## 2021-11-26 RX ADMIN — APIXABAN 5 MG: 5 TABLET, FILM COATED ORAL at 08:01

## 2021-11-26 RX ADMIN — HYDROMORPHONE HYDROCHLORIDE 1 MG: 2 TABLET ORAL at 23:32

## 2021-11-26 RX ADMIN — ACETAMINOPHEN 650 MG: 325 TABLET ORAL at 17:29

## 2021-11-26 RX ADMIN — CETIRIZINE HYDROCHLORIDE 10 MG: 10 TABLET, FILM COATED ORAL at 08:01

## 2021-11-26 RX ADMIN — CALCIUM ACETATE 667 MG: 667 CAPSULE ORAL at 12:19

## 2021-11-26 RX ADMIN — AMIODARONE HYDROCHLORIDE 200 MG: 200 TABLET ORAL at 08:01

## 2021-11-26 RX ADMIN — ACETAMINOPHEN 650 MG: 325 TABLET ORAL at 08:00

## 2021-11-26 RX ADMIN — DULOXETINE HYDROCHLORIDE 20 MG: 20 CAPSULE, DELAYED RELEASE ORAL at 08:00

## 2021-11-26 RX ADMIN — Medication 1 CAPSULE: at 08:01

## 2021-11-26 RX ADMIN — ACETAMINOPHEN 650 MG: 325 TABLET ORAL at 12:19

## 2021-11-26 RX ADMIN — LATANOPROST 1 DROP: 50 SOLUTION OPHTHALMIC at 21:40

## 2021-11-26 RX ADMIN — EPOETIN ALFA-EPBX 8000 UNITS: 4000 INJECTION, SOLUTION INTRAVENOUS; SUBCUTANEOUS at 21:39

## 2021-11-26 RX ADMIN — GABAPENTIN 200 MG: 100 CAPSULE ORAL at 19:00

## 2021-11-26 RX ADMIN — Medication 500 MG: at 08:01

## 2021-11-26 RX ADMIN — APIXABAN 5 MG: 5 TABLET, FILM COATED ORAL at 17:29

## 2021-11-26 RX ADMIN — LEVOTHYROXINE SODIUM 125 MCG: 125 TABLET ORAL at 06:48

## 2021-11-26 RX ADMIN — MIDODRINE HYDROCHLORIDE 5 MG: 5 TABLET ORAL at 17:29

## 2021-11-26 RX ADMIN — ALLOPURINOL 100 MG: 100 TABLET ORAL at 21:39

## 2021-11-26 RX ADMIN — MIDODRINE HYDROCHLORIDE 5 MG: 5 TABLET ORAL at 12:19

## 2021-11-26 NOTE — PROGRESS NOTES
Problem: Falls - Risk of  Goal: *Absence of Falls  Description: Document Bertha Maldonado Fall Risk and appropriate interventions in the flowsheet.   Outcome: Progressing Towards Goal  Note: Fall Risk Interventions:  Mobility Interventions: Assess mobility with egress test, Bed/chair exit alarm, Communicate number of staff needed for ambulation/transfer, OT consult for ADLs, Patient to call before getting OOB, PT Consult for mobility concerns, PT Consult for assist device competence, Strengthening exercises (ROM-active/passive), Utilize gait belt for transfers/ambulation    Mentation Interventions: Adequate sleep, hydration, pain control, Bed/chair exit alarm, Door open when patient unattended, Evaluate medications/consider consulting pharmacy, Eyeglasses and hearing aids, Gait belt with transfers/ambulation, Toileting rounds    Medication Interventions: Bed/chair exit alarm, Evaluate medications/consider consulting pharmacy, Patient to call before getting OOB, Utilize gait belt for transfers/ambulation    Elimination Interventions: Bed/chair exit alarm, Call light in reach, Patient to call for help with toileting needs, Stay With Me (per policy)    History of Falls Interventions: Bed/chair exit alarm, Consult care management for discharge planning, Door open when patient unattended, Evaluate medications/consider consulting pharmacy, Room close to nurse's station, Utilize gait belt for transfer/ambulation         Problem: Pain  Goal: *Control of Pain  Outcome: Progressing Towards Goal

## 2021-11-26 NOTE — PROGRESS NOTES
Problem: Mobility Impaired (Adult and Pediatric)  Goal: *Therapy Goal (Edit Goal, Insert Text)  Description: Physical Therapy Short Term Goals  Initiated 11/20/2021 and to be accomplished within 7 day(s) on 11/27/2021  1. Patient will move from supine to sit and sit to supine , scoot up and down, and roll side to side in bed with supervision/set-up. 2.  Patient will transfer from bed to chair and chair to bed with moderate assistance  using the least restrictive device, consistently maintaining TTWB left LE. 3.  Patient will perform sit to stand with minimal to moderate assistance without additional assistance left LE to consistently maintain TTWB. 4.  Patient will perform w/c mobility SBA level over even surfaces for at least 200 ft before fatiguing. 5.  Patient will be able to perform static standing for at least 2 minute duration with 1-2 UE support before needing seated rest, maintaining TTWB left LE appropriately. Physical Therapy Long Term Goals  Initiated 11/20/2021 and to be accomplished within 28 day(s) 12/18/2021  1. Patient will move from supine to sit and sit to supine , scoot up and down, and roll side to side in bed with modified independence. 2.  Patient will transfer from bed to chair and chair to bed with modified independence using the least restrictive device. 3.  Patient will perform sit to stand with supervision/set-up. 4.  Patient will perform gait training if and when appropriate based on ability to consistently maintain TTWB left LE. 5.  Patient will perform w/c mobility mod I over even surfaces for at least 200 ft  6. Patient will negotiate w/c ramp with distant supervision.        Outcome: Progressing Towards Goal    PHYSICAL THERAPY TREATMENT    Patient: Nicole Borges [de-identified]64 y.o. female)  Date: 11/26/2021  Diagnosis: Multiple closed pelvic fractures without disruption of pelvic ring ([facility] [address] [S32.82XA] Multiple closed pelvic fractures without disruption of pelvic ring [facility])  Precautions: Contact, Fall, Skin, PWB (50% Left LE)  Chart, physical therapy assessment, plan of care and goals were reviewed. Time Turkish:58  Time Out:1030    Patient seen for: Balance activities; Gait training; Patient education; Transfer training; Wheelchair mobility    Pain:  Pt pain was reported as 0/10 at rest, 4/10 with ambulation pre-treatment. Pt pain was reported as 4/10 post-treatment. Intervention: Education and rest breaks, pt reports she received pain medication prior to treatment. Patient identified with name and : yes    SUBJECTIVE:      Pt is pleasant and cooperative noting she wishes to keep getting stronger. OBJECTIVE DATA SUMMARY:    Objective:     TRANSFERS Daily Assessment     Transfer Type: Other  Other: Stand Step with RW  Transfer Assistance : 5 (Supervision/setup)  Sit to Stand Assistance: Supervision   Pt requires supervision for safety with functional transfers with verbal cues for approach with RW when fatigued after gait trials. GAIT Daily Assessment    Gait Description (WDL)  Exceptions to WDL    Gait Abnormalities Decreased step clearance    Assistive device Walker, rolling    Ambulation assistance - level surface 5 (Supervision/setup)    Distance 150 Feet (ft) x 2 trials    Ambulation assistance- uneven surface  NT    Comments Pt ambulates with partial step through pattern maintaining left LE PWB but requires moderate verbal cues for safe RW management as pt intermittently lifts up on RW to advance it. Pt also requires moderate verbal and tactile cues for scap add and depression with UE weight bearing on RW. STEPS/STAIRS Daily Assessment     Steps/Stairs ambulated 2 (6\")    Assistance Required 4 (Contact guard assistance) for safety and balance    Rail Use  Bilateral    Comments   Pt ascends/descends stairs with step to step pattern with right LE leading ascending and left LE leading descending with CGA for safety and moderate verbal encouragement. Curbs/Ramps  NT        BALANCE Daily Assessment     Sitting - Static: Good (unsupported)  Sitting - Dynamic: Good (unsupported)  Standing - Static: Fair  Standing - Dynamic : Impaired        WHEELCHAIR MOBILITY Daily Assessment     Pt propels w/c on unit over level surfaces with modified independence. THERAPEUTIC EXERCISES Daily Assessment     Seated LE Strength Training:  3 Sets of 10 Repetitions each: Alternate Hip Flexion  Right and Left LAQ  Red theraband resisted Hip Abd/Add        ASSESSMENT:  Pt is progressing well mobilizing with decreased physical assistance and ambulating further distances this treatment session. Progression toward goals:  []      Improving appropriately and progressing toward goals  [x]      Improving slowly and progressing toward goals  []      Not making progress toward goals and plan of care will be adjusted      PLAN:  Patient continues to benefit from skilled intervention to address the above impairments. Continue treatment per established plan of care. Emphasize functional strengthening to promote increased safety and independence with mobility. Discharge Recommendations:  Home Health Physical Therapy versus/to progress to Outpatient PT pending progress  Further Equipment Recommendations for Discharge:  rolling walker (pt reports she owns RW)      Estimated Discharge Date: 12/17/2021    Activity Tolerance:   Good  Please refer to the flowsheet for vital signs taken during this treatment.     After treatment:   [] Patient left in no apparent distress in bed  [x] Patient left in no apparent distress sitting up in chair awaiting OT treatment  [] Patient left in no apparent distress in w/c mobilizing under own power  [] Patient left in no apparent distress dining area  [] Patient left in no apparent distress mobilizing under own power  [] Call bell left within reach  [] Nursing notified  [] Caregiver present  [] Bed alarm activated   [x] Chair alarm activated      Peg Fetch Lanny Ji, TU  11/26/2021

## 2021-11-26 NOTE — PROGRESS NOTES
Problem: Self Care Deficits Care Plan (Adult)  Goal: *Therapy Goal (Edit Goal, Insert Text)  Description: Long Term Goals (to be met upon discharge date) in order to increase pt's functional independence and safety, and decrease burden of care:  1. Pt will perform self-feeding with Mod Independent  2. Pt will perform grooming with Mod Independent  3. Pt will perform UB bathing with supervision  4. Pt will perform LB bathing with SBA  5. Pt will perform shower transfer with SBA  6. Pt will perform UB dressing with supervision  7. Pt will perform LB dressing with SBA  8. Pt will perform toileting task with SBA  9. Pt will perform toilet transfer with SBA  10. Pt will perform an IADL task while standing with SBA     Short Term Weekly Goals for (2021-2021) in order to increase pt's functional independence and safety, and decrease burden of care:  1. Pt will perform self-feeding with supervision  2. Pt will perform grooming with supervision  3. Pt will perform UB bathing with SBA  4. Pt will perform LB bathing with Min A  5. Pt will perform shower transfer with Min A  6. Pt will perform UB dressing with SBA  7. Pt will perform LB dressing with Min A  8. Pt will perform toileting task with Min A  9. Pt will perform toilet transfer with Min A  Occupational Therapy TREATMENT    Patient: Jonna Fatima   64 y.o. Patient identified with name and : Yes     Date: 2021    First Tx Session  Time In: 21   Time Out[de-identified] 1200    Diagnosis: Multiple closed pelvic fractures without disruption of pelvic ring (HCC) [S32.82XA]   Precautions: Contact, Fall, Skin, PWB (50% Left LE)  Chart, occupational therapy assessment, plan of care, and goals were reviewed. Pain:  Pt reports 0/10 pain or discomfort prior to treatment.     Pt reports 7/10 pain or discomfort post treatment upon standing/movement  Intervention Provided: N/A  Pt given pain meds earlier      SUBJECTIVE:   Patient stated I only hurt when I move.    OBJECTIVE DATA SUMMARY:     THERAPEUTIC ACTIVITY Daily Assessment    Peg Board Task to increase standing tolerance for ADLs. Pt able to stand up to 6 minutes with RW. Reaching for cones in refrigerator/freezer/sink to increase safety with RW during ADLs. Pt showed fair return demonstration and given a handout. THERAPEUTIC EXERCISE Daily Assessment    Sci Fit up to 10 minutes to increase strength and ROM for ADLs. Instruct pt. in home exercise program: UB HEP (chess press, low chest pulls wings, bicep curls, self asst overhead press,) 3x10 with 2lb dumb bell. Pt shows decrease strength and ROM at light compared to left Handout given. MOBILITY/TRANSFERS Daily Assessment     Sit to stand x3 with S.  Pt showing good carryover of weightbearing precautions. ASSESSMENT:  Pt working towards increasing BUE strength, standing balance/tolerance, and safety with RW. Progression toward goals:  [x]          Improving appropriately and progressing toward goals  []          Improving slowly and progressing toward goals  []          Not making progress toward goals and plan of care will be adjusted     PLAN:  Patient continues to benefit from skilled intervention to address the above impairments. Continue treatment per established plan of care. Discharge Recommendations:  Home Health with 24 hr asst  Further Equipment Recommendations for Discharge:  bedside commode and N/A     Activity Tolerance:  Fair       Estimated LOS:    Please refer to the flowsheet for vital signs taken during this treatment. After treatment:   [x]  Patient left in no apparent distress sitting up in chair   []  Patient left in no apparent distress in bed  []  Call bell left within reach  []  Nursing notified  []  Caregiver present  []  Bed alarm activated    COMMUNICATION/EDUCATION:   [] Home safety education was provided and the patient/caregiver indicated understanding.   [x] Patient/family have participated as able in goal setting and plan of care. [] Patient/family agree to work toward stated goals and plan of care. [] Patient understands intent and goals of therapy, but is neutral about his/her participation. [] Patient is unable to participate in goal setting and plan of care.       Ez Carrasco, OT   Outcome: Progressing Towards Goal

## 2021-11-26 NOTE — ROUTINE PROCESS
SHIFT CHANGE NOTE FOR [facility]    Bedside and Verbal shift change report given to JOSE ANTONIO Tran (oncoming nurse) by Lidia Chris (offgoing nurse). Report included the following information SBAR, Kardex, MAR and Recent Results.     Situation:   Code Status: DNR   Hospital Day: 7   Problem List:   Hospital Problems  Date Reviewed: 11/26/2021          Codes Class Noted POA    * (Principal) Multiple closed pelvic fractures without disruption of pelvic ring ([facility] ICD-10-CM: U06.04RJ  ICD-9-CM: 808.44  11/19/2021 Yes        Hyperphosphatemia ICD-10-CM: E83.39  ICD-9-CM: 275.3  11/14/2021 Yes        Anticoagulated by anticoagulation treatment ICD-10-CM: Z79.01  ICD-9-CM: V58.61  Unknown Yes    Overview Signed 11/23/2021  9:49 AM by Danyel To MD     On Apixaban             Paroxysmal atrial fibrillation (HCC) (Chronic) ICD-10-CM: I48.0  ICD-9-CM: 427.31  Unknown Yes        Closed fracture of left inferior pubic ramus, with routine healing, subsequent encounter ICD-10-CM: S32.592D  ICD-9-CM: V54.13  11/12/2021 Yes        Closed nondisplaced fracture of anterior wall of left acetabulum with routine healing ICD-10-CM: S32.415D  ICD-9-CM: V54.19  11/12/2021 Yes        Closed fracture of superior pubic ramus, left, with routine healing, subsequent encounter ICD-10-CM: S32.512D  ICD-9-CM: V54.19  11/12/2021 Yes        History of infection with vancomycin resistant Enterococcus (VRE) ICD-10-CM: Z86.19  ICD-9-CM: V12.09  10/8/2021 Yes    Overview Signed 11/23/2021  9:51 AM by Danyel To MD     Urine culture (collected 10/8/2021, resulted 10/14/2021) yielded growth of >100,000 colonies/ml of Enterococcus faecalis RESISTANT to Ciprofloxacin, Levofloxacin, Tetracycline and Vancomycin             History of urinary tract infection ICD-10-CM: Z87.440  ICD-9-CM: V13.02  10/8/2021 Yes    Overview Signed 11/23/2021  9:52 AM by Danyel To MD     Urine culture (collected 10/8/2021, resulted 10/14/2021) yielded growth of >100,000 colonies/ml of Enterococcus faecalis RESISTANT to Ciprofloxacin, Levofloxacin, Tetracycline and Vancomycin             Need for prophylactic isolation ICD-10-CM: Z41.8  ICD-9-CM: V07.0  10/8/2021 Yes    Overview Signed 11/23/2021  9:52 AM by Adiel Escobar MD     Urine culture (collected 10/8/2021, resulted 10/14/2021) yielded growth of >100,000 colonies/ml of Enterococcus faecalis RESISTANT to Ciprofloxacin, Levofloxacin, Tetracycline and Vancomycin             End-stage renal disease on hemodialysis (HCC) (Chronic) ICD-10-CM: N18.6, Z99.2  ICD-9-CM: 585.6, V45.11  Unknown Yes    Overview Signed 11/23/2021  9:56 AM by Adiel Escobar MD     HD at [address] [place] [address] on MWF. 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 ([facility] (Chronic) ICD-10-CM: E11.22, N18.6  ICD-9-CM: 250.40, 585.6  Unknown Yes    Overview Signed 11/23/2021  9:50 AM by Adiel Escobar MD     HbA1c (10/8/2021) = 4.6             Chronic hypotension (Chronic) ICD-10-CM: I95.89  ICD-9-CM: 458.1  Unknown Yes    Overview Signed 11/23/2021 10:01 AM by Adiel Escobar MD     On Midodrine                   Background:   Past Medical History:   Past Medical History:   Diagnosis Date    [facility]     Anticoagulated by anticoagulation treatment     On Apixaban    Chronic hypotension     On Midodrine    Chronic pain     Closed fracture of left inferior pubic ramus, with routine healing, subsequent encounter 11/12/2021    Closed fracture of superior pubic ramus, left, with routine healing, subsequent encounter 11/12/2021    Closed nondisplaced fracture of anterior wall of left acetabulum with routine healing 11/12/2021    Depression     End-stage renal disease on hemodialysis ([facility]     HD at [address] [place] [address] on MWF.  Tel # [phone]    Gastroesophageal reflux disease     Glaucoma     History of acute pyelonephritis 2/20/2020    History of hydronephrosis 10/5/2021    History of infection with vancomycin resistant Enterococcus (VRE) 10/8/2021    Urine culture (collected 10/8/2021, resulted 10/14/2021) yielded growth of >100,000 colonies/ml of Enterococcus faecalis RESISTANT to Ciprofloxacin, Levofloxacin, Tetracycline and Vancomycin    History of kidney stones     History of recurrent urinary tract infection     History of sepsis 6/18/2021    History of septic shock 10/8/2021    History of urethral stricture     History of urinary tract infection 10/8/2021    Urine culture (collected 10/8/2021, resulted 10/14/2021) yielded growth of >100,000 colonies/ml of Enterococcus faecalis RESISTANT to Ciprofloxacin, Levofloxacin, Tetracycline and Vancomycin    Hyperlipidemia     Hyperphosphatemia 11/14/2021    Hypothyroidism     Lung mass     Need for prophylactic isolation 10/8/2021    Urine culture (collected 10/8/2021, resulted 10/14/2021) yielded growth of >100,000 colonies/ml of Enterococcus faecalis RESISTANT to Ciprofloxacin, Levofloxacin, Tetracycline and Vancomycin    Paroxysmal atrial fibrillation ([facility] [facility]     Secondary hyperparathyroidism of renal origin ([facility]     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 ([facility] [facility]     HbA1c (10/8/2021) = 4.6    Uric acid nephrolithiasis     Urinary incontinence         Assessment:   Changes in Assessment throughout shift: No change to previous assessment    Patient has a central line: no   Patient has Cline Cath: no     Last Vitals:     Vitals:    11/25/21 1151 11/25/21 1548 11/25/21 2317 11/26/21 0718   BP: 129/75 (!) 110/58 124/62 136/67   Pulse: 80 70 82 78   Resp:  18 17 17   Temp:  96.8 °F (36 °C) 97.5 °F (36.4 °C) 97 °F (36.1 °C)   TempSrc:       SpO2:  100% 97% 98%   Height:            PAIN    Pain Assessment    Pain Intensity 1: 0 (11/26/21 0905) Pain Intensity 1: 2 (12/29/14 1105)    Pain Location 1: Hand, Hip Pain Location 1: Abdomen    Pain Intervention(s) 1: Medication (see MAR), Cold pack Pain Intervention(s) 1: Medication (see MAR)  Patient Stated Pain Goal: 0 Patient Stated Pain Goal: 0  o Intervention effective: yes  o Other actions taken for pain: Medication (see MAR); Cold pack     Skin Assessment  Skin color Skin Color: Appropriate for ethnicity  Condition/Temperature Skin Condition/Temp: Dry, Warm  Integrity Skin Integrity: Intact  Turgor Turgor: Non-tenting  Weekly Pressure Ulcer Documentation  Pressure  Injury Documentation: No Pressure Injury Noted-Pressure Ulcer Prevention Initiated  Wound Prevention & Protection Methods  Orientation of wound Orientation of Wound Prevention: Posterior  Location of Prevention Location of Wound Prevention: Buttocks, Sacrum/Coccyx  Dressing Present Dressing Present : No  Dressing Status    Wound Offloading Wound Offloading (Prevention Methods): Bed, pressure redistribution/air, Pillows, Repositioning     INTAKE/OUPUT  Date 11/25/21 0700 - 11/26/21 0659 11/26/21 0700 - 11/27/21 0659   Shift 3579-6462 6713-0177 24 Hour Total 0808-3226 0112-5679 24 Hour Total   INTAKE   P.O. 2626 335 2811 240  240     P. O. 5579 368 3081 240  240   Shift Total 8244 510 8681 240  240   OUTPUT   Urine           Urine Occurrence(s) 3 x 0 x 3 x 0 x  0 x   Stool           Stool Occurrence(s) 0 x 0 x 0 x 0 x  0 x   Shift Total         NET 2811 756 1857 240  240   Weight (kg)             Recommendations:  1. Patient needs and requests: None at this time    2. Pending tests/procedures: Routine labs     3. Functional Level/Equipment: Partial (one person) / Bed (comment); Dennis Layman; Wheelchair    Fall Precautions:   Fall risk precautions were reinforced with the patient; she was instructed to call for help prior to getting up. The following fall risk precautions were continued: bed/ chair alarms, door signage, yellow bracelet and socks as well as update of the [place] tool in the patient's room.    Freddy Score: 4    HEALS Safety Check    A safety check occurred in the patient's room between off going nurse and oncoming nurse listed above. The safety check included the below items  Area Items   H  High Alert Medications - Verify all high alert medication drips (heparin, PCA, etc.)   E  Equipment - Suction is set up for ALL patients (with adelaide)  - Red plugs utilized for all equipment (IV pumps, etc.)  - WOWs wiped down at end of shift.  - Room stocked with oxygen, suction, and other unit-specific supplies   A  Alarms - Bed alarm is set for fall risk patients  - Ensure chair alarm is in place and activated if patient is up in a chair   L  Lines - Check IV for any infiltration  - Cline bag is empty if patient has a Cline   - Tubing and IV bags are labeled   S  Safety   - Room is clean, patient is clean, and equipment is clean. - Hallways are clear from equipment besides carts. - Fall bracelet on for fall risk patients  - Ensure room is clear and free of clutter  - Suction is set up for ALL patients (with adelaide)  - Hallways are clear from equipment besides carts.    - Isolation precautions followed, supplies available outside room, sign posted     Colonel Foots

## 2021-11-26 NOTE — PROGRESS NOTES
RENAL DAILY PROGRESS NOTE            63y F with PMH ESRD admitted in rehab after pelvic fracture, following for ESRD management   Subjective:       Complaint:   Overnight events noted  Examined during dialysis     IMPRESSION:   ESRD, MWF  Access; left arm fistula   Anemia , on epogen   H/o chronic UTI h/o right ureteral stent  Close pelvic fracture   Atrial fibrilation    PLAN:   HD with 2K bath UF goal 2L as tolerated. On 2021, I saw and examined patient during hemodialysis treatment. The patient was receiving hemodialysis for treatment of  renal failure. I have also reviewed vital signs, intake and output, lab results and recent events, and agreed with today's dialysis order. HD rounding    Blood pressure 129/65, pulse 83, temperature 98.4 °F (36.9 °C), temperature source Oral, resp. rate 18, height 5' 2\" (1.575 m), SpO2 98 %.   Temp (24hrs), Av.4 °F (36.3 °C), Min:96.8 °F (36 °C), Max:98.4 °F (36.9 °C)      Blood Pressure: BP: 129/65  Pulse: Pulse (Heart Rate): 83  Temp:  Temp: 98.4 °F (36.9 °C)    Artificial Kidney Dialyzer/Set Up Inspection: Revaclear   hours Duration of Treatment (hours): 3 hours   Heparin Bolus    Blood flow rate Blood Flow Rate (ml/min): 350 ml/min   Dialysate rate     Arterial Access Pressure Arterial Access Pressure (mmHg): -230  Venous Return Pressure Venous Return Pressure (mmHg): 140  Ultrafiltration Rate Goal/Amount of Fluid to Remove (mL): 2000 mL  Fluid Removal Fluid Removed (mL): 2500  Net Fluid Removal NET Fluid Removed (mL): 2000 ml            Current Facility-Administered Medications   Medication Dose Route Frequency    calcium acetate(phosphat bind) (PHOSLO) capsule 667 mg  1 Capsule Oral TID WITH MEALS    midodrine (PROAMATINE) tablet 5 mg  5 mg Oral TID WITH MEALS    allopurinoL (ZYLOPRIM) tablet 100 mg  100 mg Oral Q MON, WED & FRI    epoetin caitlin-epbx (RETACRIT) injection 8,000 Units  8,000 Units SubCUTAneous Q MON, WED & FRI    cetirizine (ZYRTEC) tablet 10 mg  10 mg Oral DAILY    acetaminophen (TYLENOL) tablet 650 mg  650 mg Oral Q4H PRN    bisacodyL (DULCOLAX) tablet 10 mg  10 mg Oral Q48H PRN    amiodarone (CORDARONE) tablet 200 mg  200 mg Oral DAILY    acetaminophen (TYLENOL) tablet 650 mg  650 mg Oral TID    apixaban (ELIQUIS) tablet 5 mg  5 mg Oral BID    HYDROmorphone (DILAUDID) tablet 1 mg  1 mg Oral Q8H PRN    meclizine (ANTIVERT) tablet 12.5 mg  12.5 mg Oral TID PRN    DULoxetine (CYMBALTA) capsule 20 mg  20 mg Oral DAILY    vitamin B complex-folic acid 0.4 mg tablet  1 Tablet Oral DAILY    cyanocobalamin tablet 1,000 mcg  1,000 mcg Oral DAILY    L. acidophilus,casei,rhamnosus (BIO-K PLUS) capsule 1 Capsule  1 Capsule Oral DAILY    ascorbic acid (vitamin C) (VITAMIN C) tablet 500 mg  500 mg Oral DAILY    cholecalciferol (VITAMIN D3) (1000 Units /25 mcg) tablet 2,000 Units  2,000 Units Oral BID    latanoprost (XALATAN) 0.005 % ophthalmic solution 1 Drop  1 Drop Both Eyes QPM    levothyroxine (SYNTHROID) tablet 125 mcg  125 mcg Oral 6am    pantoprazole (PROTONIX) tablet 20 mg  20 mg Oral ACB    ondansetron (ZOFRAN ODT) tablet 4 mg  4 mg Oral TID PRN    lidocaine 4 % patch 1 Patch  1 Patch TransDERmal Q24H    gabapentin (NEURONTIN) capsule 200 mg  200 mg Oral Q MON, WED & FRI    doxercalciferoL (HECTOROL) 4 mcg/2 mL injection 4 mcg  4 mcg IntraVENous DIALYSIS MON, WED & FRI       Review of Symptoms: comprehensive ROS negative except above.    Objective:     Patient Vitals for the past 24 hrs:   Temp Pulse Resp BP SpO2   11/26/21 1400 -- 83 -- 129/65 --   11/26/21 1345 -- 76 -- (!) 132/48 --   11/26/21 1330 -- 84 -- 131/73 --   11/26/21 1315 -- 74 -- (!) 127/56 --   11/26/21 1301 -- 77 -- (!) 126/58 --   11/26/21 1255 98.4 °F (36.9 °C) 70 18 118/62 --   11/26/21 1211 -- 79 -- 120/70 --   11/26/21 0718 97 °F (36.1 °C) 78 17 136/67 98 %   11/25/21 2317 97.5 °F (36.4 °C) 82 17 124/62 97 %   11/25/21 1548 96.8 °F (36 °C) 70 18 (!) 110/58 100 %        Weight change:      11/24 1901 - 11/26 0700  In: 1680 [P.O.:1680]  Out: -     Intake/Output Summary (Last 24 hours) at 11/26/2021 1433  Last data filed at 11/26/2021 0942  Gross per 24 hour   Intake 1080 ml   Output --   Net 1080 ml     Physical Exam:   General: comfortable, no acute distress   HEENT sclera anicteric, supple neck, no thyromegaly  CVS: S1S2 heard,  no rub  RS: + air entry b/l,   Abd: Soft, Non tender,  Neuro: non focal, awake, alert , CN II-XII are grossly intact  Extrm: edema, no cyanosis, clubbing   Skin: no visible  Rash  Musculoskeletal: No gross joints or bone deformities         Data Review:     LABS:   Hematology:   Recent Labs     11/26/21  0635 11/24/21  0539   WBC 8.0 6.3   HGB 9.7* 8.8*   HCT 32.4* 29.4*     Chemistry:   Recent Labs     11/26/21  0635 11/24/21  0539   BUN 48* 47*   CREA 6.38* 6.87*   CA 9.4 9.1   ALB 3.6 3.4   K 5.1 5.7*    137    101   CO2 28 25   PHOS 7.2* 8.1*   * 81            Procedures/imaging: see electronic medical records for all procedures, Xrays and details which were not copied into this note but were reviewed prior to creation of Plan          Assessment & Plan:     As above         Cherri Pacheco MD  11/26/2021  2:33 PM

## 2021-11-26 NOTE — ROUTINE PROCESS
SHIFT CHANGE NOTE FOR [facility]    Bedside and Verbal shift change report given to Madelaine Briscoe (oncoming nurse) by Junito Nice RN (offgoing nurse). Report included the following information SBAR, Kardex, MAR and Recent Results.     Situation:   Code Status: DNR   Hospital Day: 7   Problem List:   Hospital Problems  Date Reviewed: 11/25/2021          Codes Class Noted POA    * (Principal) Multiple closed pelvic fractures without disruption of pelvic ring ([facility] [address] ICD-10-CM: C61.89HI  ICD-9-CM: 808.44  11/19/2021 Yes        Anticoagulated by anticoagulation treatment ICD-10-CM: Z79.01  ICD-9-CM: V58.61  Unknown Yes    Overview Signed 11/23/2021  9:49 AM by Emily Austin MD     On Apixaban             Paroxysmal atrial fibrillation (HCC) (Chronic) ICD-10-CM: I48.0  ICD-9-CM: 427.31  Unknown Yes        Closed fracture of left inferior pubic ramus, with routine healing, subsequent encounter ICD-10-CM: S32.592D  ICD-9-CM: V54.13  11/12/2021 Yes        Closed nondisplaced fracture of anterior wall of left acetabulum with routine healing ICD-10-CM: S32.415D  ICD-9-CM: V54.19  11/12/2021 Yes        Closed fracture of superior pubic ramus, left, with routine healing, subsequent encounter ICD-10-CM: S32.512D  ICD-9-CM: V54.19  11/12/2021 Yes        History of infection with vancomycin resistant Enterococcus (VRE) ICD-10-CM: Z86.19  ICD-9-CM: V12.09  10/8/2021 Yes    Overview Signed 11/23/2021  9:51 AM by Emily Austin MD     Urine culture (collected 10/8/2021, resulted 10/14/2021) yielded growth of >100,000 colonies/ml of Enterococcus faecalis RESISTANT to Ciprofloxacin, Levofloxacin, Tetracycline and Vancomycin             History of urinary tract infection ICD-10-CM: Z87.440  ICD-9-CM: V13.02  10/8/2021 Yes    Overview Signed 11/23/2021  9:52 AM by Emily Austin MD     Urine culture (collected 10/8/2021, resulted 10/14/2021) yielded growth of >100,000 colonies/ml of Enterococcus faecalis RESISTANT to Ciprofloxacin, Levofloxacin, Tetracycline and Vancomycin             Need for prophylactic isolation ICD-10-CM: Z41.8  ICD-9-CM: V07.0  10/8/2021 Yes    Overview Signed 11/23/2021  9:52 AM by Ephriam Ahumada, MD     Urine culture (collected 10/8/2021, resulted 10/14/2021) yielded growth of >100,000 colonies/ml of Enterococcus faecalis RESISTANT to Ciprofloxacin, Levofloxacin, Tetracycline and Vancomycin             End-stage renal disease on hemodialysis (HCC) (Chronic) ICD-10-CM: N18.6, Z99.2  ICD-9-CM: 585.6, V45.11  Unknown Yes    Overview Signed 11/23/2021  9:56 AM by Ephriam Ahumada, MD     HD at [facility] on MWF. 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[facility] (Chronic) ICD-10-CM: E11.22, N18.6  ICD-9-CM: 250.40, 585.6  Unknown Yes    Overview Signed 11/23/2021  9:50 AM by Ephriam Ahumada, MD     HbA1c (10/8/2021) = 4.6             Chronic hypotension (Chronic) ICD-10-CM: I95.89  ICD-9-CM: 458.1  Unknown Yes    Overview Signed 11/23/2021 10:01 AM by Ephriam Ahumada, MD     On Midodrine                   Background:   Past Medical History:   Past Medical History:   Diagnosis Date    Anemia in end-stage renal disease ([facility]     Anticoagulated by anticoagulation treatment     On Apixaban    Chronic hypotension     On Midodrine    Chronic pain     Closed fracture of left inferior pubic ramus, with routine healing, subsequent encounter 11/12/2021    Closed fracture of superior pubic ramus, left, with routine healing, subsequent encounter 11/12/2021    Closed nondisplaced fracture of anterior wall of left acetabulum with routine healing 11/12/2021    Depression     End-stage renal disease on hemodialysis ([facility]     [facility] on MWF.  Tel # [phone]    Gastroesophageal reflux disease     Glaucoma     History of acute pyelonephritis 2/20/2020    History of hydronephrosis 10/5/2021    History of infection with vancomycin resistant Enterococcus (VRE) 10/8/2021    Urine culture (collected 10/8/2021, resulted 10/14/2021) yielded growth of >100,000 colonies/ml of Enterococcus faecalis RESISTANT to Ciprofloxacin, Levofloxacin, Tetracycline and Vancomycin    History of kidney stones     History of recurrent urinary tract infection     History of sepsis 6/18/2021    History of septic shock 10/8/2021    History of urethral stricture     History of urinary tract infection 10/8/2021    Urine culture (collected 10/8/2021, resulted 10/14/2021) yielded growth of >100,000 colonies/ml of Enterococcus faecalis RESISTANT to Ciprofloxacin, Levofloxacin, Tetracycline and Vancomycin    Hyperlipidemia     Hypothyroidism     Lung mass     Need for prophylactic isolation 10/8/2021    Urine culture (collected 10/8/2021, resulted 10/14/2021) yielded growth of >100,000 colonies/ml of Enterococcus faecalis RESISTANT to Ciprofloxacin, Levofloxacin, Tetracycline and Vancomycin    Paroxysmal atrial fibrillation (HCC)     Secondary hyperparathyroidism of renal origin ([facility]     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 ([facility]     HbA1c (10/8/2021) = 4.6    Uric acid nephrolithiasis     Urinary incontinence         Assessment:   Changes in Assessment throughout shift:       Patient has a central line: no Reasons if yes: n/a  Insertion date:n/a Last dressing date:n/a   Patient has Chua Cath: no Reasons if yes: n/a   Insertion date:n/a  Shift chua care completed: N/A     Last Vitals:     Vitals:    11/25/21 0741 11/25/21 1151 11/25/21 1548 11/25/21 2317   BP: 128/73 129/75 (!) 110/58 124/62   Pulse: 78 80 70 82   Resp: 18  18 17   Temp: 97.1 °F (36.2 °C)  96.8 °F (36 °C) 97.5 °F (36.4 °C)   TempSrc:       SpO2: 100%  100% 97%   Height:            PAIN    Pain Assessment    Pain Intensity 1: 0 (11/26/21 0415) Pain Intensity 1: 2 (12/29/14 1105)    Pain Location 1: Hand, Hip Pain Location 1: Abdomen    Pain Intervention(s) 1: Medication (see MAR), Cold pack Pain Intervention(s) 1: Medication (see MAR)  Patient Stated Pain Goal: Unable to verbalize/indicate pain (sleeping) Patient Stated Pain Goal: 0  o Intervention effective: yes  o Other actions taken for pain: Medication (see MAR); Cold pack     Skin Assessment  Skin color Skin Color: Appropriate for ethnicity  Condition/Temperature Skin Condition/Temp: Dry, Warm  Integrity Skin Integrity: Intact  Turgor Turgor: Non-tenting  Weekly Pressure Ulcer Documentation  Pressure  Injury Documentation: No Pressure Injury Noted-Pressure Ulcer Prevention Initiated  Wound Prevention & Protection Methods  Orientation of wound Orientation of Wound Prevention: Posterior  Location of Prevention Location of Wound Prevention: Buttocks, Sacrum/Coccyx  Dressing Present Dressing Present : No  Dressing Status    Wound Offloading Wound Offloading (Prevention Methods): Bed, pressure reduction mattress, Pillows, Repositioning, Turning, Wheelchair     INTAKE/OUPUT  Date 11/25/21 0700 - 11/26/21 0659 11/26/21 0700 - 11/27/21 0659   Shift 8034-8623 2265-2806 24 Hour Total 2123-6177 0800-8791 24 Hour Total   INTAKE   P.O. 5620 442 1797        P.O. 9798 978 4085      Shift Total 9493 689 0580      OUTPUT   Urine           Urine Occurrence(s) 3 x 0 x 3 x      Stool           Stool Occurrence(s) 0 x 0 x 0 x      Shift Total         NET 5678 750 8109      Weight (kg)             Recommendations:  1. Patient needs and requests: assistance with ADL's, pain management    2. Pending tests/procedures: dialysis     3. Functional Level/Equipment: Partial (one person) / Bed (comment); Wheelchair    Fall Precautions:   Fall risk precautions were reinforced with the patient; she was instructed to call for help prior to getting up. The following fall risk precautions were continued: bed/ chair alarms, door signage, yellow bracelet and socks as well as update of the [place] tool in the patient's room.    Freddy Score: 4    HEALS Safety Check    A safety check occurred in the patient's room between off going nurse and oncoming nurse listed above. The safety check included the below items  Area Items   H  High Alert Medications - Verify all high alert medication drips (heparin, PCA, etc.)   E  Equipment - Suction is set up for ALL patients (with adelaide)  - Red plugs utilized for all equipment (IV pumps, etc.)  - WOWs wiped down at end of shift.  - Room stocked with oxygen, suction, and other unit-specific supplies   A  Alarms - Bed alarm is set for fall risk patients  - Ensure chair alarm is in place and activated if patient is up in a chair   L  Lines - Check IV for any infiltration  - Cline bag is empty if patient has a Cline   - Tubing and IV bags are labeled   S  Safety   - Room is clean, patient is clean, and equipment is clean. - Hallways are clear from equipment besides carts. - Fall bracelet on for fall risk patients  - Ensure room is clear and free of clutter  - Suction is set up for ALL patients (with adelaide)  - Hallways are clear from equipment besides carts.    - Isolation precautions followed, supplies available outside room, sign posted     Neena Gan RN

## 2021-11-26 NOTE — PROGRESS NOTES
Problem: Risk for Spread of Infection  Goal: Prevent transmission of infectious organism to others  Description: Prevent the transmission of infectious organisms to other patients, staff members, and visitors. Outcome: Progressing Towards Goal     Problem: Patient Education:  Go to Education Activity  Goal: Patient/Family Education  Outcome: Progressing Towards Goal     Problem: Falls - Risk of  Goal: *Absence of Falls  Description: Document Thurmond Fall Risk and appropriate interventions in the flowsheet.   Outcome: Progressing Towards Goal  Note: Fall Risk Interventions:  Mobility Interventions: Assess mobility with egress test, Bed/chair exit alarm, Communicate number of staff needed for ambulation/transfer, OT consult for ADLs, Patient to call before getting OOB, PT Consult for mobility concerns, PT Consult for assist device competence, Strengthening exercises (ROM-active/passive), Utilize gait belt for transfers/ambulation    Mentation Interventions: Adequate sleep, hydration, pain control, Bed/chair exit alarm, Door open when patient unattended, Evaluate medications/consider consulting pharmacy, Eyeglasses and hearing aids, Gait belt with transfers/ambulation, Toileting rounds    Medication Interventions: Bed/chair exit alarm, Evaluate medications/consider consulting pharmacy, Patient to call before getting OOB, Utilize gait belt for transfers/ambulation    Elimination Interventions: Bed/chair exit alarm, Call light in reach, Patient to call for help with toileting needs, Stay With Me (per policy)    History of Falls Interventions: Bed/chair exit alarm, Consult care management for discharge planning, Door open when patient unattended, Evaluate medications/consider consulting pharmacy, Room close to nurse's station, Utilize gait belt for transfer/ambulation         Problem: Patient Education: Go to Patient Education Activity  Goal: Patient/Family Education  Outcome: Progressing Towards Goal     Problem: Pressure Injury - Risk of  Goal: *Prevention of pressure injury  Description: Document Giovany Scale and appropriate interventions in the flowsheet.   Outcome: Progressing Towards Goal  Note: Pressure Injury Interventions:  Sensory Interventions: Keep linens dry and wrinkle-free, Maintain/enhance activity level, Minimize linen layers, Pressure redistribution bed/mattress (bed type), Assess need for specialty bed    Moisture Interventions: Absorbent underpads, Maintain skin hydration (lotion/cream), Minimize layers    Activity Interventions: Increase time out of bed, Pressure redistribution bed/mattress(bed type), Chair cushion    Mobility Interventions: Pressure redistribution bed/mattress (bed type), HOB 30 degrees or less    Nutrition Interventions: Document food/fluid/supplement intake                     Problem: Patient Education: Go to Patient Education Activity  Goal: Patient/Family Education  Outcome: Progressing Towards Goal     Problem: Patient Education: Go to Patient Education Activity  Goal: Patient/Family Education  Outcome: Progressing Towards Goal     Problem: Patient Education: Go to Patient Education Activity  Goal: Patient/Family Education  Outcome: Progressing Towards Goal     Problem: Pain  Goal: *Control of Pain  Outcome: Progressing Towards Goal     Problem: Patient Education: Go to Patient Education Activity  Goal: Patient/Family Education  Outcome: Progressing Towards Goal

## 2021-11-26 NOTE — PROGRESS NOTES
[facility] PHYSICAL REHABILITATION  [address] [place] [address], [address]     INPATIENT REHABILITATION  DAILY PROGRESS NOTE     Date: 11/26/2021    Name: Darell Kennedy Age / Sex: 64 y.o. / female   CSN: [id number] MRN: [id number]   [address] [facility] Date: 11/19/2021 Length of Stay: 7 days     Primary Rehab Diagnosis: Impaired Mobility and ADLs secondary to Multiple closed pelvic fractures (comminuted fracture involving the left anterior acetabular wall with involvement of the base of the left superior pubic ramus which are not significantly displaced; nondisplaced left inferior pubic ramus fracture)      Subjective:     Patient seen and examined. Blood pressure controlled. Patient's Complaint:   No significant medical complaints    Pain Control: stable, mild-to-moderate joint symptoms intermittently, reasonably well controlled by current meds      Objective:     Vital Signs:  Patient Vitals for the past 24 hrs:   BP Temp Pulse Resp SpO2   11/26/21 0718 136/67 97 °F (36.1 °C) 78 17 98 %   11/25/21 2317 124/62 97.5 °F (36.4 °C) 82 17 97 %   11/25/21 1548 (!) 110/58 96.8 °F (36 °C) 70 18 100 %   11/25/21 1151 129/75 -- 80 -- --        Physical Examination:  GENERAL SURVEY: Patient is awake, alert, oriented x 3, sitting comfortably on the chair, not in acute respiratory distress. HEENT: pink palpebral conjunctivae, anicteric sclerae, no nasoaural discharge, moist oral mucosa  NECK: supple, no jugular venous distention, no palpable lymph nodes  CHEST/LUNGS: symmetrical chest expansion, good air entry, clear breath sounds  HEART: adynamic precordium, good S1 S2, no S3, regular rhythm, no murmurs  ABDOMEN: obese, bowel sounds appreciated, soft, non-tender  EXTREMITIES: pink nailbeds, no edema, full and equal pulses, no calf tenderness   NEUROLOGICAL EXAM: The patient is awake, alert and oriented x3, able to answer questions fairly appropriately, able to follow 1 and 2 step commands.   Able to tell time from the wall clock.  Cranial nerves II-XII are grossly intact. No gross sensory deficit. Motor strength is 4 to 4+/5 on BUE and RLE, 2+ to 3-/5 on the left hip, 3 to 3+/5 on the left knee and left ankle.        Current Medications:  Current Facility-Administered Medications   Medication Dose Route Frequency    midodrine (PROAMATINE) tablet 5 mg  5 mg Oral TID WITH MEALS    allopurinoL (ZYLOPRIM) tablet 100 mg  100 mg Oral Q MON, WED & FRI    epoetin caitlin-epbx (RETACRIT) injection 8,000 Units  8,000 Units SubCUTAneous Q MON, WED & FRI    cetirizine (ZYRTEC) tablet 10 mg  10 mg Oral DAILY    acetaminophen (TYLENOL) tablet 650 mg  650 mg Oral Q4H PRN    bisacodyL (DULCOLAX) tablet 10 mg  10 mg Oral Q48H PRN    amiodarone (CORDARONE) tablet 200 mg  200 mg Oral DAILY    acetaminophen (TYLENOL) tablet 650 mg  650 mg Oral TID    apixaban (ELIQUIS) tablet 5 mg  5 mg Oral BID    HYDROmorphone (DILAUDID) tablet 1 mg  1 mg Oral Q8H PRN    meclizine (ANTIVERT) tablet 12.5 mg  12.5 mg Oral TID PRN    DULoxetine (CYMBALTA) capsule 20 mg  20 mg Oral DAILY    vitamin B complex-folic acid 0.4 mg tablet  1 Tablet Oral DAILY    cyanocobalamin tablet 1,000 mcg  1,000 mcg Oral DAILY    L. acidophilus,casei,rhamnosus (BIO-K PLUS) capsule 1 Capsule  1 Capsule Oral DAILY    ascorbic acid (vitamin C) (VITAMIN C) tablet 500 mg  500 mg Oral DAILY    cholecalciferol (VITAMIN D3) (1000 Units /25 mcg) tablet 2,000 Units  2,000 Units Oral BID    latanoprost (XALATAN) 0.005 % ophthalmic solution 1 Drop  1 Drop Both Eyes QPM    levothyroxine (SYNTHROID) tablet 125 mcg  125 mcg Oral 6am    pantoprazole (PROTONIX) tablet 20 mg  20 mg Oral ACB    ondansetron (ZOFRAN ODT) tablet 4 mg  4 mg Oral TID PRN    lidocaine 4 % patch 1 Patch  1 Patch TransDERmal Q24H    gabapentin (NEURONTIN) capsule 200 mg  200 mg Oral Q MON, WED & FRI    doxercalciferoL (HECTOROL) 4 mcg/2 mL injection 4 mcg  4 mcg IntraVENous DIALYSIS MON, WED & FRI Allergies: Allergies   Allergen Reactions    Albumin, Human 25 % Itching     Headache - severe migraine like, itchy eyes, runny nose    Ciprofloxacin Hives    Cyclopentolate Unknown (comments)    Iron Sucrose Diarrhea    Statins-Hmg-Coa Reductase Inhibitors Other (comments)     Body ache       Lab/Data Review:  Recent Results (from the past 24 hour(s))   CBC WITH AUTOMATED DIFF    Collection Time: 11/26/21  6:35 AM   Result Value Ref Range    WBC 8.0 4.6 - 13.2 K/uL    RBC 3.20 (L) 4.20 - 5.30 M/uL    HGB 9.7 (L) 12.0 - 16.0 g/dL    HCT 32.4 (L) 35.0 - 45.0 %    .3 (H) 78.0 - 100.0 FL    MCH 30.3 24.0 - 34.0 PG    MCHC 29.9 (L) 31.0 - 37.0 g/dL    RDW 16.0 (H) 11.6 - 14.5 %    PLATELET 087 348 - 390 K/uL    MPV 9.3 9.2 - 11.8 FL    NRBC 0.0 0  WBC    ABSOLUTE NRBC 0.00 0.00 - 0.01 K/uL    NEUTROPHILS 47 40 - 73 %    LYMPHOCYTES 41 21 - 52 %    MONOCYTES 9 3 - 10 %    EOSINOPHILS 2 0 - 5 %    BASOPHILS 1 0 - 2 %    IMMATURE GRANULOCYTES 1 (H) 0.0 - 0.5 %    ABS. NEUTROPHILS 3.7 1.8 - 8.0 K/UL    ABS. LYMPHOCYTES 3.3 0.9 - 3.6 K/UL    ABS. MONOCYTES 0.7 0.05 - 1.2 K/UL    ABS. EOSINOPHILS 0.2 0.0 - 0.4 K/UL    ABS. BASOPHILS 0.1 0.0 - 0.1 K/UL    ABS. IMM. GRANS. 0.1 (H) 0.00 - 0.04 K/UL    DF AUTOMATED     RENAL FUNCTION PANEL    Collection Time: 11/26/21  6:35 AM   Result Value Ref Range    Sodium 136 136 - 145 mmol/L    Potassium 5.1 3.5 - 5.5 mmol/L    Chloride 102 100 - 111 mmol/L    CO2 28 21 - 32 mmol/L    Anion gap 6 3.0 - 18 mmol/L    Glucose 101 (H) 74 - 99 mg/dL    BUN 48 (H) 7.0 - 18 MG/DL    Creatinine 6.38 (H) 0.6 - 1.3 MG/DL    BUN/Creatinine ratio 8 (L) 12 - 20      GFR est AA 8 (L) >60 ml/min/1.73m2    GFR est non-AA 6 (L) >60 ml/min/1.73m2    Calcium 9.4 8.5 - 10.1 MG/DL    Phosphorus 7.2 (H) 2.5 - 4.9 MG/DL    Albumin 3.6 3.4 - 5.0 g/dL       Assessment:     Primary Rehabilitation Diagnosis  1.  Impaired Mobility and ADLs  2. 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) E11.22, N18.6    Secondary hyperparathyroidism of renal origin ([facility] [address] N25.81    Gastroesophageal reflux disease K21.9    History of recurrent urinary tract infection Z87.440    History of sepsis Z86.19    End-stage renal disease on hemodialysis ([facility]) N18.6, Z99.2    History of hydronephrosis Z87.448    History of septic shock Z86.19    Anticoagulated by anticoagulation treatment Z79.01    Paroxysmal atrial fibrillation ([facility]) I48.0    Closed fracture of left inferior pubic ramus, with routine healing, subsequent encounter S32.592D    Closed nondisplaced fracture of anterior wall of left acetabulum with routine healing S32.415D    Closed fracture of superior pubic ramus, left, with routine healing, subsequent encounter S32.512D    Multiple closed pelvic fractures without disruption of pelvic ring ([facility] [address] S32.82XA    Depression F32. A    History of urinary tract infection Z87.440    Need for prophylactic isolation Z41.8    Hyperlipidemia E78.5    Hypothyroidism E03.9    History of kidney stones Z87.442    Chronic pain G89.29    Lung mass R91.8    History of urethral stricture Z87.448    Uric acid nephrolithiasis N20.0    Urinary incontinence R32    Glaucoma H40.9       Plan:     1. Justification for continued stay: Good progression towards established rehabilitation goals.     2. Medical Issues being followed closely:    [x]  Fall and safety precautions     []  Wound Care     [x]  Bowel and Bladder Function     [x]  Fluid Electrolyte and Nutrition Balance     [x]  Pain Control      3. Issues that 24 hour rehabilitation nursing is following:    [x]  Fall and safety precautions     []  Wound Care     [x]  Bowel and Bladder Function     [x]  Fluid Electrolyte and Nutrition Balance     [x]  Pain Control      [x]  Assistance with and education on in-room safety with transfers to and from the bed, wheelchair, toilet and shower. 4. Acute rehabilitation plan of care:    [x]  Continue current care and rehab. [x]  Physical Therapy           [x]  Occupational Therapy           []  Speech Therapy     []  Hold Rehab until further notice     5. Medications:    [x]  MAR Reviewed     [x]  Continue Present Medications     6. DVT Prophylaxis:      []  Enoxaparin     []  Unfractionated Heparin     []  Warfarin     [x]  NOAC     []  AMELIE Stockings     []  Sequential Compression Device     []  None     7. Code status    []  Full code     []  Partial code     []  Do not intubate     [x]  Do not resuscitate     8.  Orders:   > Multiple closed pelvic fractures (comminuted fracture involving the left anterior acetabular wall with involvement of the base of the left superior pubic ramus which are not significantly displaced; nondisplaced left inferior pubic ramus fracture)   > Orthopedic surgery recommending non-surgical management with PT, pain control   > Partial (50%) weightbearing on the left lower extremity     > Urinary tract infection, History of right ureteral stent   > Urine culture (collected 11/16/2021, resulted 11/20/2021) yielded growth of >100,000 colonies/ml of Proteus mirabilis RESISTANT to Nitrofurantoin   > Started on IV ceftriaxone and transitioned to cefpodoxime   > On discharge, patient is to follow up with Urology (Dr. Maico Shore)   > On 11/25/2021, patient had completed the recommended treatment course of Cefpodoxime 200 mg PO q 24 hr     > History of VRE urinary tract infection, 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)   > Urine culture (collected 10/8/2021, resulted 10/14/2021) yielded growth of >100,000 colonies/ml of Enterococcus faecalis RESISTANT to Ciprofloxacin, Levofloxacin, Tetracycline and Vancomycin   > Contact isolation    > Paroxysmal atrial fibrillation, anticoagulated on Apixaban   > Anticoagulation    > Continue Apixaban 5 mg PO BID    > Rate-control    > None   > Maintenance of sinus rhythm    > Continue Amiodarone 200 mg PO once daily    > Glaucoma    > Continue Latanoprost 0.005% ophthalmic solution, 1 drop into each ete q PM    > Hypothyroidism   > TSH (11/13/2021) = 11.4   > TSH (11/22/2021) = 1.69   > Free T4 (11/22/2021) = 1.1   > Continue Levothyroxine 125 mcg PO once daily     > Chronic hypotension   > On 11/23/2021, decreased Midodrine from 10 mg to 5 mg PO TID with meals   > Continue Midodrine 5 mg PO TID with meals     > End-stage renal disease, on hemodialysis (Mon-Wed-Fri); Secondary hyperparathyroidism of renal origin   > Nephrology consult (Dr. 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 Allergy to albumin   > Will list as allergy per pt and daughter-in-law request.  Start antihistamine.   Monitor.    > Hyperkalemia      11/24/21  0539 11/22/21  0527 11/19/21  0124 11/18/21  0143 11/17/21  0145 11/16/21  0232 11/15/21  0216 11/14/21  0424 11/13/21  0445 11/12/21  1520   K 5.7* 5.4 4.8 4.3 4.7 4.6 4.8 4.4 4.7 3.5      > On 11/24/2021, patient was given Sodium zirconium cyclosilicate 10 grams PO x 1 dose   > K (11/26/2021) = 5.1    > Hyperphosphatemia      11/26/21  0635 11/24/21  0539 11/22/21  0527 11/14/21  0424   PHOS 7.2* 8.1* 8.1* 6.0*      > Start Calcium acetate 667 mg PO TID with meals    > Analgesia / Chronic low back pain   > Continue:    > Acetaminophen 650 mg PO TID (8AM, 12PM, 4PM)    > Acetaminophen 650 mg PO q 4 hr PRN for pain level 4/10 or lesser (from 8PM to 4AM only)    > Duloxetine 20 mg PO once daily    > Gabapentin 200 mg PO q Mon-Wed-Fri    > Lidocaine 4% patch, 1 patch on affected area q 24 hr (12 hr on, 12 hr off)    > Hydromorphone 1 mg PO q 8 hr PRN for pain level 5/10 or greater     > Diet:   > Specifications: 3 carb choices (45 gm/meal); Low fat/Low cholesterol/High fiber/IRINEO; Low potassium (less than 3,000 mg/day); Low phosphorus (less than 1,000 mg/day)   > Solids (consistency): Regular   > Liquids (consistency): Thin   > Fluid restriction: None       9. Personal Protective Equipment (N95 face mask and eye goggles) was used while interacting with the patient. Patient was using a surgical mask. 10. Patient's progress in rehabilitation and medical issues discussed with the patient. All questions answered to the best of my ability. Care plan discussed with patient and nurse. 11. Total clinical care time is 30 minutes, including review of chart including all labs, radiology, past medical history, and discussion with patient. Greater than 50% of my time was spent in coordination of care and counseling.       Signed:    Cookie Erazo MD    November 26, 2021

## 2021-11-26 NOTE — ROUTINE PROCESS
SHIFT CHANGE NOTE FOR [facility]    Bedside and Verbal shift change report given to [facility] RN (oncoming nurse) by Jie Pereyra RN (offgoing nurse). Report included the following information SBAR, Kardex, MAR and Recent Results.     Situation:   Code Status: DNR   Hospital Day: 8   Problem List:   Hospital Problems  Date Reviewed: 11/26/2021          Codes Class Noted POA    * (Principal) Multiple closed pelvic fractures without disruption of pelvic ring ([facility] [address] ICD-10-CM: X30.93FZ  ICD-9-CM: 808.44  11/19/2021 Yes        Hyperphosphatemia ICD-10-CM: E83.39  ICD-9-CM: 275.3  11/14/2021 Yes        Anticoagulated by anticoagulation treatment ICD-10-CM: Z79.01  ICD-9-CM: V58.61  Unknown Yes    Overview Signed 11/23/2021  9:49 AM by Enrique Florentino MD     On Apixaban             Paroxysmal atrial fibrillation (HCC) (Chronic) ICD-10-CM: I48.0  ICD-9-CM: 427.31  Unknown Yes        Closed fracture of left inferior pubic ramus, with routine healing, subsequent encounter ICD-10-CM: S32.592D  ICD-9-CM: V54.13  11/12/2021 Yes        Closed nondisplaced fracture of anterior wall of left acetabulum with routine healing ICD-10-CM: S32.415D  ICD-9-CM: V54.19  11/12/2021 Yes        Closed fracture of superior pubic ramus, left, with routine healing, subsequent encounter ICD-10-CM: S32.512D  ICD-9-CM: V54.19  11/12/2021 Yes        History of infection with vancomycin resistant Enterococcus (VRE) ICD-10-CM: Z86.19  ICD-9-CM: V12.09  10/8/2021 Yes    Overview Signed 11/23/2021  9:51 AM by Enrique Florentino MD     Urine culture (collected 10/8/2021, resulted 10/14/2021) yielded growth of >100,000 colonies/ml of Enterococcus faecalis RESISTANT to Ciprofloxacin, Levofloxacin, Tetracycline and Vancomycin             History of urinary tract infection ICD-10-CM: Z87.440  ICD-9-CM: V13.02  10/8/2021 Yes    Overview Signed 11/23/2021  9:52 AM by Enrique Florentino MD     Urine culture (collected 10/8/2021, resulted 10/14/2021) yielded growth of >100,000 colonies/ml of Enterococcus faecalis RESISTANT to Ciprofloxacin, Levofloxacin, Tetracycline and Vancomycin             Need for prophylactic isolation ICD-10-CM: Z41.8  ICD-9-CM: V07.0  10/8/2021 Yes    Overview Signed 11/23/2021  9:52 AM by Keri Link MD     Urine culture (collected 10/8/2021, resulted 10/14/2021) yielded growth of >100,000 colonies/ml of Enterococcus faecalis RESISTANT to Ciprofloxacin, Levofloxacin, Tetracycline and Vancomycin             End-stage renal disease on hemodialysis (HCC) (Chronic) ICD-10-CM: N18.6, Z99.2  ICD-9-CM: 585.6, V45.11  Unknown Yes    Overview Signed 11/23/2021  9:56 AM by Keri Link MD     HD at [facility] on MWF. 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 ([facility] (Chronic) ICD-10-CM: E11.22, N18.6  ICD-9-CM: 250.40, 585.6  Unknown Yes    Overview Signed 11/23/2021  9:50 AM by Keri Link MD     HbA1c (10/8/2021) = 4.6             Chronic hypotension (Chronic) ICD-10-CM: I95.89  ICD-9-CM: 458.1  Unknown Yes    Overview Signed 11/23/2021 10:01 AM by Keri Link MD     On Midodrine                   Background:   Past Medical History:   Past Medical History:   Diagnosis Date    Anemia in end-stage renal disease ([facility]     Anticoagulated by anticoagulation treatment     On Apixaban    Chronic hypotension     On Midodrine    Chronic pain     Closed fracture of left inferior pubic ramus, with routine healing, subsequent encounter 11/12/2021    Closed fracture of superior pubic ramus, left, with routine healing, subsequent encounter 11/12/2021    Closed nondisplaced fracture of anterior wall of left acetabulum with routine healing 11/12/2021    Depression     End-stage renal disease on hemodialysis ([facility]     HD at [facility] on MWF.  Tel # [phone]    Gastroesophageal reflux disease     Glaucoma     History of acute pyelonephritis 2/20/2020    History of hydronephrosis 10/5/2021    History of infection with vancomycin resistant Enterococcus (VRE) 10/8/2021    Urine culture (collected 10/8/2021, resulted 10/14/2021) yielded growth of >100,000 colonies/ml of Enterococcus faecalis RESISTANT to Ciprofloxacin, Levofloxacin, Tetracycline and Vancomycin    History of kidney stones     History of recurrent urinary tract infection     History of sepsis 6/18/2021    History of septic shock 10/8/2021    History of urethral stricture     History of urinary tract infection 10/8/2021    Urine culture (collected 10/8/2021, resulted 10/14/2021) yielded growth of >100,000 colonies/ml of Enterococcus faecalis RESISTANT to Ciprofloxacin, Levofloxacin, Tetracycline and Vancomycin    Hyperlipidemia     Hyperphosphatemia 11/14/2021    Hypothyroidism     Lung mass     Need for prophylactic isolation 10/8/2021    Urine culture (collected 10/8/2021, resulted 10/14/2021) yielded growth of >100,000 colonies/ml of Enterococcus faecalis RESISTANT to Ciprofloxacin, Levofloxacin, Tetracycline and Vancomycin    Paroxysmal atrial fibrillation ([facility]     Secondary hyperparathyroidism of renal origin (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     HbA1c (10/8/2021) = 4.6    Uric acid nephrolithiasis     Urinary incontinence         Assessment:   Changes in Assessment throughout shift: No change to previous assessment    Patient has a central line: no Patient has Cline Cath: no   Vitals:    11/26/21 1605 11/26/21 1615 11/26/21 1653 11/26/21 2000   BP: 132/66 (!) 114/51 123/63 (!) 108/53   Pulse: 83 85 78 84   Resp:  18 18 18   Temp:  97.8 °F (36.6 °C) 97 °F (36.1 °C) 97.2 °F (36.2 °C)   TempSrc:  Oral     SpO2:   97% 99%   Height:            PAIN    Pain Assessment    Pain Intensity 1: 0 (11/27/21 0408) Pain Intensity 1: 2 (12/29/14 1105)    Pain Location 1: Hip Pain Location 1: Abdomen    Pain Intervention(s) 1: Medication (see MAR), Cold pack Pain Intervention(s) 1: Medication (see MAR)  Patient Stated Pain Goal: 0 Patient Stated Pain Goal: 0  o Intervention effective: yes  o Other actions taken for pain:       Skin Assessment  Skin color Skin Color: Appropriate for ethnicity  Condition/Temperature Skin Condition/Temp: Dry, Warm  Integrity Skin Integrity: Intact  Turgor Turgor: Non-tenting  Weekly Pressure Ulcer Documentation  Pressure  Injury Documentation: No Pressure Injury Noted-Pressure Ulcer Prevention Initiated  Wound Prevention & Protection Methods  Orientation of wound Orientation of Wound Prevention: Posterior  Location of Prevention Location of Wound Prevention: Buttocks, Sacrum/Coccyx  Dressing Present Dressing Present : No  Dressing Status    Wound Offloading Wound Offloading (Prevention Methods): Bed, pressure redistribution/air, Pillows, Repositioning     INTAKE/OUPUT  Date 11/26/21 0700 - 11/27/21 0659 11/27/21 0700 - 11/28/21 0659   Shift 2605-6562 2013-4492 24 Hour Total 1327-4120 6438-9144 24 Hour Total   INTAKE   P.O. 600  600        P. O. 600  600      Shift Total 600  600      OUTPUT   Urine           Urine Occurrence(s) 1 x 0 x 1 x      Stool           Stool Occurrence(s) 1 x 0 x 1 x      Dialysis 2000 2000        NET Fluid Removed (mL) 2000 2000      Shift Total 2000 2000      NET -1400  -1400      Weight (kg)             Recommendations:  Patient needs and requests: none at this time  1. Pending tests/procedures: none     2. Functional Level/Equipment:   / Bed (comment)    Fall Precautions:   Fall risk precautions were reinforced with the patient; she was instructed to call for help prior to getting up. The following fall risk precautions were continued: bed/ chair alarms, door signage, yellow bracelet and socks as well as update of the Danney Mess tool in the patient's room. Freddy Score: 4    HEALS Safety Check    A safety check occurred in the patient's room between off going nurse and oncoming nurse listed above.     The safety check included the below items  Area Items   H  High Alert Medications - Verify all high alert medication drips (heparin, PCA, etc.)   E  Equipment - Suction is set up for ALL patients (with adelaide)  - Red plugs utilized for all equipment (IV pumps, etc.)  - WOWs wiped down at end of shift.  - Room stocked with oxygen, suction, and other unit-specific supplies   A  Alarms - Bed alarm is set for fall risk patients  - Ensure chair alarm is in place and activated if patient is up in a chair   L  Lines - Check IV for any infiltration  - Cline bag is empty if patient has a Cline   - Tubing and IV bags are labeled   S  Safety   - Room is clean, patient is clean, and equipment is clean. - Hallways are clear from equipment besides carts. - Fall bracelet on for fall risk patients  - Ensure room is clear and free of clutter  - Suction is set up for ALL patients (with adelaide)  - Hallways are clear from equipment besides carts.    - Isolation precautions followed, supplies available outside room, sign posted     Eliana Hampton RN

## 2021-11-26 NOTE — PROGRESS NOTES
Called  patient son Shayla Whitmore, [phone]. No answer. Left  to schedule caregiver education dates.

## 2021-11-27 PROCEDURE — 74011250637 HC RX REV CODE- 250/637: Performed by: INTERNAL MEDICINE

## 2021-11-27 PROCEDURE — 74011250637 HC RX REV CODE- 250/637: Performed by: FAMILY MEDICINE

## 2021-11-27 PROCEDURE — 74011000250 HC RX REV CODE- 250: Performed by: INTERNAL MEDICINE

## 2021-11-27 PROCEDURE — 2709999900 HC NON-CHARGEABLE SUPPLY

## 2021-11-27 PROCEDURE — 65310000000 HC RM PRIVATE REHAB

## 2021-11-27 RX ADMIN — CALCIUM ACETATE 667 MG: 667 CAPSULE ORAL at 12:32

## 2021-11-27 RX ADMIN — DULOXETINE HYDROCHLORIDE 20 MG: 20 CAPSULE, DELAYED RELEASE ORAL at 08:19

## 2021-11-27 RX ADMIN — ACETAMINOPHEN 650 MG: 325 TABLET ORAL at 12:32

## 2021-11-27 RX ADMIN — Medication 1 CAPSULE: at 08:19

## 2021-11-27 RX ADMIN — CALCIUM ACETATE 667 MG: 667 CAPSULE ORAL at 08:20

## 2021-11-27 RX ADMIN — LIDOCAINE AND PRILOCAINE: 25; 25 CREAM TOPICAL at 08:19

## 2021-11-27 RX ADMIN — Medication 1 TABLET: at 08:19

## 2021-11-27 RX ADMIN — LATANOPROST 1 DROP: 50 SOLUTION OPHTHALMIC at 21:28

## 2021-11-27 RX ADMIN — CHOLECALCIFEROL TAB 25 MCG (1000 UNIT) 2000 UNITS: 25 TAB at 08:19

## 2021-11-27 RX ADMIN — ACETAMINOPHEN 650 MG: 325 TABLET ORAL at 16:34

## 2021-11-27 RX ADMIN — MIDODRINE HYDROCHLORIDE 5 MG: 5 TABLET ORAL at 08:19

## 2021-11-27 RX ADMIN — AMIODARONE HYDROCHLORIDE 200 MG: 200 TABLET ORAL at 08:20

## 2021-11-27 RX ADMIN — MIDODRINE HYDROCHLORIDE 5 MG: 5 TABLET ORAL at 16:33

## 2021-11-27 RX ADMIN — HYDROMORPHONE HYDROCHLORIDE 1 MG: 2 TABLET ORAL at 23:41

## 2021-11-27 RX ADMIN — ACETAMINOPHEN 650 MG: 325 TABLET ORAL at 08:19

## 2021-11-27 RX ADMIN — LEVOTHYROXINE SODIUM 125 MCG: 125 TABLET ORAL at 07:11

## 2021-11-27 RX ADMIN — LIDOCAINE AND PRILOCAINE: 25; 25 CREAM TOPICAL at 12:33

## 2021-11-27 RX ADMIN — CHOLECALCIFEROL TAB 25 MCG (1000 UNIT) 2000 UNITS: 25 TAB at 17:01

## 2021-11-27 RX ADMIN — APIXABAN 5 MG: 5 TABLET, FILM COATED ORAL at 08:20

## 2021-11-27 RX ADMIN — APIXABAN 5 MG: 5 TABLET, FILM COATED ORAL at 17:01

## 2021-11-27 RX ADMIN — Medication 500 MG: at 08:19

## 2021-11-27 RX ADMIN — PANTOPRAZOLE SODIUM 20 MG: 20 TABLET, DELAYED RELEASE ORAL at 07:12

## 2021-11-27 RX ADMIN — CALCIUM ACETATE 667 MG: 667 CAPSULE ORAL at 16:34

## 2021-11-27 RX ADMIN — CETIRIZINE HYDROCHLORIDE 10 MG: 10 TABLET, FILM COATED ORAL at 08:19

## 2021-11-27 RX ADMIN — CYANOCOBALAMIN TAB 1000 MCG 1000 MCG: 1000 TAB at 08:19

## 2021-11-27 NOTE — ROUTINE PROCESS
SHIFT CHANGE NOTE FOR [facility]    Bedside and Verbal shift change report given to JOSE ANTONIO Tran (oncoming nurse) by Jose Baltazar (offgoing nurse). Report included the following information SBAR, Kardex, MAR and Recent Results.     Situation:   Code Status: DNR   Hospital Day: 8   Problem List:   Hospital Problems  Date Reviewed: 11/26/2021          Codes Class Noted POA    * (Principal) Multiple closed pelvic fractures without disruption of pelvic ring ([facility] ICD-10-CM: B80.95KR  ICD-9-CM: 808.44  11/19/2021 Yes        Hyperphosphatemia ICD-10-CM: E83.39  ICD-9-CM: 275.3  11/14/2021 Yes        Anticoagulated by anticoagulation treatment ICD-10-CM: Z79.01  ICD-9-CM: V58.61  Unknown Yes    Overview Signed 11/23/2021  9:49 AM by Ignacia Pérez MD     On Apixaban             Paroxysmal atrial fibrillation (HCC) (Chronic) ICD-10-CM: I48.0  ICD-9-CM: 427.31  Unknown Yes        Closed fracture of left inferior pubic ramus, with routine healing, subsequent encounter ICD-10-CM: S32.592D  ICD-9-CM: V54.13  11/12/2021 Yes        Closed nondisplaced fracture of anterior wall of left acetabulum with routine healing ICD-10-CM: S32.415D  ICD-9-CM: V54.19  11/12/2021 Yes        Closed fracture of superior pubic ramus, left, with routine healing, subsequent encounter ICD-10-CM: S32.512D  ICD-9-CM: V54.19  11/12/2021 Yes        History of infection with vancomycin resistant Enterococcus (VRE) ICD-10-CM: Z86.19  ICD-9-CM: V12.09  10/8/2021 Yes    Overview Signed 11/23/2021  9:51 AM by Ignacia Pérez MD     Urine culture (collected 10/8/2021, resulted 10/14/2021) yielded growth of >100,000 colonies/ml of Enterococcus faecalis RESISTANT to Ciprofloxacin, Levofloxacin, Tetracycline and Vancomycin             History of urinary tract infection ICD-10-CM: Z87.440  ICD-9-CM: V13.02  10/8/2021 Yes    Overview Signed 11/23/2021  9:52 AM by Ignacia Pérez MD     Urine culture (collected 10/8/2021, resulted 10/14/2021) yielded growth of >100,000 colonies/ml of Enterococcus faecalis RESISTANT to Ciprofloxacin, Levofloxacin, Tetracycline and Vancomycin             Need for prophylactic isolation ICD-10-CM: Z41.8  ICD-9-CM: V07.0  10/8/2021 Yes    Overview Signed 11/23/2021  9:52 AM by Emil Porter MD     Urine culture (collected 10/8/2021, resulted 10/14/2021) yielded growth of >100,000 colonies/ml of Enterococcus faecalis RESISTANT to Ciprofloxacin, Levofloxacin, Tetracycline and Vancomycin             End-stage renal disease on hemodialysis (HCC) (Chronic) ICD-10-CM: N18.6, Z99.2  ICD-9-CM: 585.6, V45.11  Unknown Yes    Overview Signed 11/23/2021  9:56 AM by Emil Porter MD     HD at [facility] on MWF. 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 ([facility] (Chronic) ICD-10-CM: E11.22, N18.6  ICD-9-CM: 250.40, 585.6  Unknown Yes    Overview Signed 11/23/2021  9:50 AM by Emil Porter MD     HbA1c (10/8/2021) = 4.6             Chronic hypotension (Chronic) ICD-10-CM: I95.89  ICD-9-CM: 458.1  Unknown Yes    Overview Signed 11/23/2021 10:01 AM by Emil Porter MD     On Midodrine                   Background:   Past Medical History:   Past Medical History:   Diagnosis Date    Anemia in end-stage renal disease ([facility]     Anticoagulated by anticoagulation treatment     On Apixaban    Chronic hypotension     On Midodrine    Chronic pain     Closed fracture of left inferior pubic ramus, with routine healing, subsequent encounter 11/12/2021    Closed fracture of superior pubic ramus, left, with routine healing, subsequent encounter 11/12/2021    Closed nondisplaced fracture of anterior wall of left acetabulum with routine healing 11/12/2021    Depression     End-stage renal disease [facility] hemod[facility] ([facility]     HD at [facility] on MWF.  Tel # [phone]    Gastroesophageal reflux disease     Glaucoma     History of acute pyelonephritis 2/20/2020    History of hydronephrosis 10/5/2021    History of infection with vancomycin resistant Enterococcus (VRE) 10/8/2021    Urine culture (collected 10/8/2021, resulted 10/14/2021) yielded growth of >100,000 colonies/ml of Enterococcus faecalis RESISTANT to Ciprofloxacin, Levofloxacin, Tetracycline and Vancomycin    History of kidney stones     History of recurrent urinary tract infection     History of sepsis 6/18/2021    History of septic shock 10/8/2021    History of urethral stricture     History of urinary tract infection 10/8/2021    Urine culture (collected 10/8/2021, resulted 10/14/2021) yielded growth of >100,000 colonies/ml of Enterococcus faecalis RESISTANT to Ciprofloxacin, Levofloxacin, Tetracycline and Vancomycin    Hyperlipidemia     Hyperphosphatemia 11/14/2021    Hypothyroidism     Lung mass     Need for prophylactic isolation 10/8/2021    Urine culture (collected 10/8/2021, resulted 10/14/2021) yielded growth of >100,000 colonies/ml of Enterococcus faecalis RESISTANT to Ciprofloxacin, Levofloxacin, Tetracycline and Vancomycin    Paroxysmal atrial fibrillation ([facility]     Secondary hyperparathyroidism of renal origin ([facility]     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 ([facility]     HbA1c (10/8/2021) = 4.6    Uric acid nephrolithiasis     Urinary incontinence         Assessment:   Changes in Assessment throughout shift: No change to previous assessment    Patient has a central line: no   Patient has Cline Cath: no     Last Vitals:     Vitals:    11/26/21 1615 11/26/21 1653 11/26/21 2000 11/27/21 0807   BP: (!) 114/51 123/63 (!) 108/53 126/60   Pulse: 85 78 84 76   Resp: 18 18 18 18   Temp: 97.8 °F (36.6 °C) 97 °F (36.1 °C) 97.2 °F (36.2 °C) 96.9 °F (36.1 °C)   TempSrc: Oral      SpO2:  97% 99% 98%   Height:            PAIN    Pain Assessment    Pain Intensity 1: 0 (11/27/21 0939) Pain Intensity 1: 2 (12/29/14 1105)    Pain Location 1: Hip Pain Location 1: Abdomen    Pain Intervention(s) 1: Medication (see MAR) Pain Intervention(s) 1: Medication (see MAR)  Patient Stated Pain Goal: 0 Patient Stated Pain Goal: 0  o Intervention effective: yes  o Other actions taken for pain: Medication (see MAR)     Skin Assessment  Skin color Skin Color: Appropriate for ethnicity  Condition/Temperature Skin Condition/Temp: Dry, Warm  Integrity Skin Integrity: Intact  Turgor Turgor: Non-tenting  Weekly Pressure Ulcer Documentation  Pressure  Injury Documentation: No Pressure Injury Noted-Pressure Ulcer Prevention Initiated  Wound Prevention & Protection Methods  Orientation of wound Orientation of Wound Prevention: Posterior  Location of Prevention Location of Wound Prevention: Buttocks, Sacrum/Coccyx  Dressing Present Dressing Present : No  Dressing Status    Wound Offloading Wound Offloading (Prevention Methods): Bed, pressure redistribution/air, Pillows, Repositioning, Wheelchair     INTAKE/OUPUT  Date 11/26/21 0700 - 11/27/21 0659 11/27/21 0700 - 11/28/21 0659   Shift 5725-7706 0629-6394 24 Hour Total 3600-0929 7690-1346 24 Hour Total   INTAKE   P.O. 600  600 240  240     P. O. 600  600 240  240   Shift Total 600  600 240  240   OUTPUT   Urine           Urine Occurrence(s) 1 x 0 x 1 x 0 x  0 x   Stool           Stool Occurrence(s) 1 x 0 x 1 x 0 x  0 x   Dialysis 2000 2000        NET Fluid Removed (mL) 2000 2000      Shift Total 2000 2000      NET -1400  -1400 240  240   Weight (kg)             Recommendations:  1. Patient needs and requests: None at this time    2. Pending tests/procedures: Routine labs     3. Functional Level/Equipment: Partial (one person) / Bed (comment); Wheelchair; Ashley Marya    Fall Precautions:   Fall risk precautions were reinforced with the patient; she was instructed to call for help prior to getting up. The following fall risk precautions were continued: bed/ chair alarms, door signage, yellow bracelet and socks as well as update of the Clydene Bone tool in the patient's room.    Freddy Score: 4    HEALS Safety Check    A safety check occurred in the patient's room between off going nurse and oncoming nurse listed above. The safety check included the below items  Area Items   H  High Alert Medications - Verify all high alert medication drips (heparin, PCA, etc.)   E  Equipment - Suction is set up for ALL patients (with adelaide)  - Red plugs utilized for all equipment (IV pumps, etc.)  - WOWs wiped down at end of shift.  - Room stocked with oxygen, suction, and other unit-specific supplies   A  Alarms - Bed alarm is set for fall risk patients  - Ensure chair alarm is in place and activated if patient is up in a chair   L  Lines - Check IV for any infiltration  - Cline bag is empty if patient has a Cline   - Tubing and IV bags are labeled   S  Safety   - Room is clean, patient is clean, and equipment is clean. - Hallways are clear from equipment besides carts. - Fall bracelet on for fall risk patients  - Ensure room is clear and free of clutter  - Suction is set up for ALL patients (with adelaide)  - Hallways are clear from equipment besides carts.    - Isolation precautions followed, supplies available outside room, sign posted     [facility]

## 2021-11-27 NOTE — ROUTINE PROCESS
SHIFT CHANGE NOTE FOR [facility]    Bedside and Verbal shift change report given to Mirta Davis RN(oncoming nurse) by Aaliyah Pozo RN (offgoing nurse). Report included the following information SBAR, Kardex, MAR and Recent Results.     Situation:   Code Status: DNR   Hospital Day: 8   Problem List:   Hospital Problems  Date Reviewed: 11/26/2021          Codes Class Noted POA    * (Principal) Multiple closed pelvic fractures without disruption of pelvic ring ([facility] [address] ICD-10-CM: Z97.41GX  ICD-9-CM: 808.44  11/19/2021 Yes        Hyperphosphatemia ICD-10-CM: E83.39  ICD-9-CM: 275.3  11/14/2021 Yes        Anticoagulated by anticoagulation treatment ICD-10-CM: Z79.01  ICD-9-CM: V58.61  Unknown Yes    Overview Signed 11/23/2021  9:49 AM by Jennie Stout MD     On Apixaban             Paroxysmal atrial fibrillation (HCC) (Chronic) ICD-10-CM: I48.0  ICD-9-CM: 427.31  Unknown Yes        Closed fracture of left inferior pubic ramus, with routine healing, subsequent encounter ICD-10-CM: S32.592D  ICD-9-CM: V54.13  11/12/2021 Yes        Closed nondisplaced fracture of anterior wall of left acetabulum with routine healing ICD-10-CM: S32.415D  ICD-9-CM: V54.19  11/12/2021 Yes        Closed fracture of superior pubic ramus, left, with routine healing, subsequent encounter ICD-10-CM: S32.512D  ICD-9-CM: V54.19  11/12/2021 Yes        History of infection with vancomycin resistant Enterococcus (VRE) ICD-10-CM: Z86.19  ICD-9-CM: V12.09  10/8/2021 Yes    Overview Signed 11/23/2021  9:51 AM by Jennie Stout MD     Urine culture (collected 10/8/2021, resulted 10/14/2021) yielded growth of >100,000 colonies/ml of Enterococcus faecalis RESISTANT to Ciprofloxacin, Levofloxacin, Tetracycline and Vancomycin             History of urinary tract infection ICD-10-CM: Z87.440  ICD-9-CM: V13.02  10/8/2021 Yes    Overview Signed 11/23/2021  9:52 AM by Jennie Stout MD     Urine culture (collected 10/8/2021, resulted 10/14/2021) yielded growth of >100,000 colonies/ml of Enterococcus faecalis RESISTANT to Ciprofloxacin, Levofloxacin, Tetracycline and Vancomycin             Need for prophylactic isolation ICD-10-CM: Z41.8  ICD-9-CM: V07.0  10/8/2021 Yes    Overview Signed 11/23/2021  9:52 AM by Max Soto MD     Urine culture (collected 10/8/2021, resulted 10/14/2021) yielded growth of >100,000 colonies/ml of Enterococcus faecalis RESISTANT to Ciprofloxacin, Levofloxacin, Tetracycline and Vancomycin             End-stage renal disease on hemodialysis (HCC) (Chronic) ICD-10-CM: N18.6, Z99.2  ICD-9-CM: 585.6, V45.11  Unknown Yes    Overview Signed 11/23/2021  9:56 AM by Max Soto MD     HD at [facility] on [facility] on MWF. 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 ([facility] [address] (Chronic) ICD-10-CM: E11.22, N18.6  ICD-9-CM: 250.40, 585.6  Unknown Yes    Overview Signed 11/23/2021  9:50 AM by Max Soto MD     HbA1c (10/8/2021) = 4.6             Chronic hypotension (Chronic) ICD-10-CM: I95.89  ICD-9-CM: 458.1  Unknown Yes    Overview Signed 11/23/2021 10:01 AM by Max Soto MD     On Midodrine                   Background:   Past Medical History:   Past Medical History:   Diagnosis Date    Anemia in end-stage renal disease ([facility] [address]     Anticoagulated by anticoagulation treatment     On Apixaban    Chronic hypotension     On Midodrine    Chronic pain     Closed fracture of left inferior pubic ramus, with routine healing, subsequent encounter 11/12/2021    Closed fracture of superior pubic ramus, left, with routine healing, subsequent encounter 11/12/2021    Closed nondisplaced fracture of anterior wall of left acetabulum with routine healing 11/12/2021    Depression     End-stage renal disease on hemodialysis ([facility] [address]     HD at [facility] on [facility] on MWF.  Tel # [phone]    Gastroesophageal reflux disease     Glaucoma     History of acute pyelonephritis 2/20/2020    History of hydronephrosis 10/5/2021    History of infection with vancomycin resistant Enterococcus (VRE) 10/8/2021    Urine culture (collected 10/8/2021, resulted 10/14/2021) yielded growth of >100,000 colonies/ml of Enterococcus faecalis RESISTANT to Ciprofloxacin, Levofloxacin, Tetracycline and Vancomycin    History of kidney stones     History of recurrent urinary tract infection     History of sepsis 6/18/2021    History of septic shock 10/8/2021    History of urethral stricture     History of urinary tract infection 10/8/2021    Urine culture (collected 10/8/2021, resulted 10/14/2021) yielded growth of >100,000 colonies/ml of Enterococcus faecalis RESISTANT to Ciprofloxacin, Levofloxacin, Tetracycline and Vancomycin    Hyperlipidemia     Hyperphosphatemia 11/14/2021    Hypothyroidism     Lung mass     Need for prophylactic isolation 10/8/2021    Urine culture (collected 10/8/2021, resulted 10/14/2021) yielded growth of >100,000 colonies/ml of Enterococcus faecalis RESISTANT to Ciprofloxacin, Levofloxacin, Tetracycline and Vancomycin    Paroxysmal atrial fibrillation ([facility] [address]     Secondary hyperparathyroidism of renal origin ([facility] [address]     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 ([facility] [address]     HbA1c (10/8/2021) = 4.6    Uric acid nephrolithiasis     Urinary incontinence         Assessment:   Changes in Assessment throughout shift: No change to previous assessment    Patient has a central line: no Patient has Cline Cath: no   Vitals:    11/26/21 1605 11/26/21 1615 11/26/21 1653 11/26/21 2000   BP: 132/66 (!) 114/51 123/63 (!) 108/53   Pulse: 83 85 78 84   Resp:  18 18 18   Temp:  97.8 °F (36.6 °C) 97 °F (36.1 °C) 97.2 °F (36.2 °C)   TempSrc:  Oral     SpO2:   97% 99%   Height:            PAIN    Pain Assessment    Pain Intensity 1: 0 (11/27/21 0408) Pain Intensity 1: 2 (12/29/14 1105)    Pain Location 1: Hip Pain Location 1: Abdomen    Pain Intervention(s) 1: Medication (see MAR), Cold pack Pain Intervention(s) 1: Medication (see MAR)  Patient Stated Pain Goal: 0 Patient Stated Pain Goal: 0  o Intervention effective: yes  o Other actions taken for pain:       Skin Assessment  Skin color Skin Color: Appropriate for ethnicity  Condition/Temperature Skin Condition/Temp: Dry, Warm  Integrity Skin Integrity: Intact  Turgor Turgor: Non-tenting  Weekly Pressure Ulcer Documentation  Pressure  Injury Documentation: No Pressure Injury Noted-Pressure Ulcer Prevention Initiated  Wound Prevention & Protection Methods  Orientation of wound Orientation of Wound Prevention: Posterior  Location of Prevention Location of Wound Prevention: Buttocks, Sacrum/Coccyx  Dressing Present Dressing Present : No  Dressing Status    Wound Offloading Wound Offloading (Prevention Methods): Bed, pressure redistribution/air, Pillows, Repositioning     INTAKE/OUPUT  Date 11/26/21 0700 - 11/27/21 0659 11/27/21 0700 - 11/28/21 0659   Shift 7425-6675 6010-7499 24 Hour Total 7408-5013 0250-0900 24 Hour Total   INTAKE   P.O. 600  600        P. O. 600  600      Shift Total 600  600      OUTPUT   Urine           Urine Occurrence(s) 1 x 0 x 1 x      Stool           Stool Occurrence(s) 1 x 0 x 1 x      Dialysis 2000 2000        NET Fluid Removed (mL) 2000 2000      Shift Total 2000 2000      NET -1400  -1400      Weight (kg)             Recommendations:  1. Patient needs and requests: none at this time    2. Pending tests/procedures: none    3. Functional Level/Equipment: Partial (one person) / Bed (comment)    Fall Precautions:   Fall risk precautions were reinforced with the patient; she was instructed to call for help prior to getting up. The following fall risk precautions were continued: bed/ chair alarms, door signage, yellow bracelet and socks as well as update of the Yanni Pacheco tool in the patient's room. Freddy Score: 4    HEALS Safety Check    A safety check occurred in the patient's room between off going nurse and oncoming nurse listed above.     The safety check included the below items  Area Items   H  High Alert Medications - Verify all high alert medication drips (heparin, PCA, etc.)   E  Equipment - Suction is set up for ALL patients (with adelaide)  - Red plugs utilized for all equipment (IV pumps, etc.)  - WOWs wiped down at end of shift.  - Room stocked with oxygen, suction, and other unit-specific supplies   A  Alarms - Bed alarm is set for fall risk patients  - Ensure chair alarm is in place and activated if patient is up in a chair   L  Lines - Check IV for any infiltration  - Cline bag is empty if patient has a Cline   - Tubing and IV bags are labeled   S  Safety   - Room is clean, patient is clean, and equipment is clean. - Hallways are clear from equipment besides carts. - Fall bracelet on for fall risk patients  - Ensure room is clear and free of clutter  - Suction is set up for ALL patients (with adelaide)  - Hallways are clear from equipment besides carts.    - Isolation precautions followed, supplies available outside room, sign posted     Danya Mcarthur RN

## 2021-11-27 NOTE — PROGRESS NOTES
Problem: Risk for Spread of Infection  Goal: Prevent transmission of infectious organism to others  Description: Prevent the transmission of infectious organisms to other patients, staff members, and visitors. Outcome: Progressing Towards Goal     Problem: Patient Education:  Go to Education Activity  Goal: Patient/Family Education  Outcome: Progressing Towards Goal     Problem: Falls - Risk of  Goal: *Absence of Falls  Description: Document Starleen Freeze Fall Risk and appropriate interventions in the flowsheet. Outcome: Progressing Towards Goal  Note: Fall Risk Interventions:  Mobility Interventions: Bed/chair exit alarm, Patient to call before getting OOB, Utilize walker, cane, or other assistive device    Mentation Interventions: Adequate sleep, hydration, pain control, Bed/chair exit alarm, Increase mobility    Medication Interventions: Assess postural VS orthostatic hypotension, Bed/chair exit alarm, Patient to call before getting OOB, Teach patient to arise slowly, Utilize gait belt for transfers/ambulation    Elimination Interventions: Bed/chair exit alarm, Call light in reach, Patient to call for help with toileting needs, Stay With Me (per policy), Toilet paper/wipes in reach    History of Falls Interventions: Bed/chair exit alarm, Room close to nurse's station, Utilize gait belt for transfer/ambulation, Investigate reason for fall         Problem: Patient Education: Go to Patient Education Activity  Goal: Patient/Family Education  Outcome: Progressing Towards Goal     Problem: Pressure Injury - Risk of  Goal: *Prevention of pressure injury  Description: Document Giovany Scale and appropriate interventions in the flowsheet.   Outcome: Progressing Towards Goal  Note: Pressure Injury Interventions:  Sensory Interventions: Assess changes in LOC, Avoid rigorous massage over bony prominences, Chair cushion, Keep linens dry and wrinkle-free, Maintain/enhance activity level, Pressure redistribution bed/mattress (bed type)    Moisture Interventions: Absorbent underpads, Minimize layers, Maintain skin hydration (lotion/cream)    Activity Interventions: Chair cushion, Pressure redistribution bed/mattress(bed type)    Mobility Interventions: Chair cushion, Pressure redistribution bed/mattress (bed type), HOB 30 degrees or less    Nutrition Interventions: Document food/fluid/supplement intake                     Problem: Patient Education: Go to Patient Education Activity  Goal: Patient/Family Education  Outcome: Progressing Towards Goal     Problem: Patient Education: Go to Patient Education Activity  Goal: Patient/Family Education  Outcome: Progressing Towards Goal     Problem: Patient Education: Go to Patient Education Activity  Goal: Patient/Family Education  Outcome: Progressing Towards Goal     Problem: Pain  Goal: *Control of Pain  Outcome: Progressing Towards Goal     Problem: Patient Education: Go to Patient Education Activity  Goal: Patient/Family Education  Outcome: Progressing Towards Goal

## 2021-11-27 NOTE — PROGRESS NOTES
[facility]  [address] [place] [address] [employer] [facility], [address]     INPATIENT REHABILITATION  DAILY PROGRESS NOTE     Date: 11/27/2021    Name: Shaan Hill Age / Sex: 64 y.o. / female   CSN: [id number] MRN: [id number]   [facility] Date: 11/19/2021 Length of Stay: 8 days     Primary Rehab Diagnosis: Impaired Mobility and ADLs secondary to Multiple closed pelvic fractures (comminuted fracture involving the left anterior acetabular wall with involvement of the base of the left superior pubic ramus which are not significantly displaced; nondisplaced left inferior pubic ramus fracture)      Subjective:     No new issues or problems reported. Blood pressure controlled.        Objective:     Vital Signs:  Patient Vitals for the past 24 hrs:   BP Temp Temp src Pulse Resp SpO2   11/27/21 0807 126/60 96.9 °F (36.1 °C) -- 76 18 98 %   11/26/21 2000 (!) 108/53 97.2 °F (36.2 °C) -- 84 18 99 %   11/26/21 1653 123/63 97 °F (36.1 °C) -- 78 18 97 %   11/26/21 1615 (!) 114/51 97.8 °F (36.6 °C) Oral 85 18 --   11/26/21 1605 132/66 -- -- 83 -- --   11/26/21 1600 124/63 -- -- 87 -- --   11/26/21 1545 100/71 -- -- 87 -- --   11/26/21 1530 130/64 -- -- 86 -- --   11/26/21 1515 (!) 109/30 -- -- 69 -- --   11/26/21 1500 117/74 -- -- 84 -- --   11/26/21 1445 133/64 -- -- 81 -- --   11/26/21 1430 (!) 123/54 -- -- 83 -- --   11/26/21 1415 128/65 -- -- 83 -- --   11/26/21 1400 129/65 -- -- 83 -- --   11/26/21 1345 (!) 132/48 -- -- 76 -- --   11/26/21 1330 131/73 -- -- 84 -- --   11/26/21 1315 (!) 127/56 -- -- 74 -- --   11/26/21 1301 (!) 126/58 -- -- 77 -- --   11/26/21 1255 118/62 98.4 °F (36.9 °C) Oral 70 18 --   11/26/21 1211 120/70 -- -- 79 -- --        Current Medications:  Current Facility-Administered Medications   Medication Dose Route Frequency    calcium acetate(phosphat bind) (PHOSLO) capsule 667 mg  1 Capsule Oral TID WITH MEALS    lidocaine-prilocaine (EMLA) 2.5-2.5 % cream   Topical BID    midodrine (PROAMATINE) tablet 5 mg  5 mg Oral TID WITH MEALS    allopurinoL (ZYLOPRIM) tablet 100 mg  100 mg Oral Q MON, WED & FRI    epoetin caitlin-epbx (RETACRIT) injection 8,000 Units  8,000 Units SubCUTAneous Q MON, WED & FRI    cetirizine (ZYRTEC) tablet 10 mg  10 mg Oral DAILY    acetaminophen (TYLENOL) tablet 650 mg  650 mg Oral Q4H PRN    bisacodyL (DULCOLAX) tablet 10 mg  10 mg Oral Q48H PRN    amiodarone (CORDARONE) tablet 200 mg  200 mg Oral DAILY    acetaminophen (TYLENOL) tablet 650 mg  650 mg Oral TID    apixaban (ELIQUIS) tablet 5 mg  5 mg Oral BID    HYDROmorphone (DILAUDID) tablet 1 mg  1 mg Oral Q8H PRN    meclizine (ANTIVERT) tablet 12.5 mg  12.5 mg Oral TID PRN    DULoxetine (CYMBALTA) capsule 20 mg  20 mg Oral DAILY    vitamin B complex-folic acid 0.4 mg tablet  1 Tablet Oral DAILY    cyanocobalamin tablet 1,000 mcg  1,000 mcg Oral DAILY    L. acidophilus,casei,rhamnosus (BIO-K PLUS) capsule 1 Capsule  1 Capsule Oral DAILY    ascorbic acid (vitamin C) (VITAMIN C) tablet 500 mg  500 mg Oral DAILY    cholecalciferol (VITAMIN D3) (1000 Units /25 mcg) tablet 2,000 Units  2,000 Units Oral BID    latanoprost (XALATAN) 0.005 % ophthalmic solution 1 Drop  1 Drop Both Eyes QPM    levothyroxine (SYNTHROID) tablet 125 mcg  125 mcg Oral 6am    pantoprazole (PROTONIX) tablet 20 mg  20 mg Oral ACB    ondansetron (ZOFRAN ODT) tablet 4 mg  4 mg Oral TID PRN    lidocaine 4 % patch 1 Patch  1 Patch TransDERmal Q24H    gabapentin (NEURONTIN) capsule 200 mg  200 mg Oral Q MON, WED & FRI    doxercalciferoL (HECTOROL) 4 mcg/2 mL injection 4 mcg  4 mcg IntraVENous DIALYSIS MON, WED & FRI       Allergies:   Allergies   Allergen Reactions    Albumin, Human 25 % Itching     Headache - severe migraine like, itchy eyes, runny nose    Ciprofloxacin Hives    Cyclopentolate Unknown (comments)    Iron Sucrose Diarrhea    Statins-Hmg-Coa Reductase Inhibitors Other (comments)     Body ache       Lab/Data Review:  No results found for this or any previous visit (from the past 24 hour(s)). Assessment:     Primary Rehabilitation Diagnosis  1. Impaired Mobility and ADLs  2. 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 ([facility]) E11.22, N18.6    Secondary hyperparathyroidism of renal origin ([facility] [address] N25.81    Gastroesophageal reflux disease K21.9    History of recurrent urinary tract infection Z87.440    History of sepsis Z86.19    End-stage renal disease on hemodialysis ([facility]) N18.6, Z99.2    History of hydronephrosis Z87.448    History of septic shock Z86.19    Anticoagulated by anticoagulation treatment Z79.01    Paroxysmal atrial fibrillation ([facility]) I48.0    Closed fracture of left inferior pubic ramus, with routine healing, subsequent encounter S32.592D    Closed nondisplaced fracture of anterior wall of left acetabulum with routine healing S32.415D    Closed fracture of superior pubic ramus, left, with routine healing, subsequent encounter S32.512D    Multiple closed pelvic fractures without disruption of pelvic ring ([facility] [address] S32.82XA    Depression F32. A    History of urinary tract infection Z87.440    Need for prophylactic isolation Z41.8    Hyperlipidemia E78.5    Hypothyroidism E03.9    History of kidney stones Z87.442    Chronic pain G89.29    Lung mass R91.8    History of urethral stricture Z87.448    Uric acid nephrolithiasis N20.0    Urinary incontinence R32    Glaucoma H40.9    Hyperphosphatemia E83.39       Plan:     1. Justification for continued stay: Good progression towards established rehabilitation goals. 2. Medical Issues being followed closely:    [x]  Fall and safety precautions     []  Wound Care     [x]  Bowel and Bladder Function     [x]  Fluid Electrolyte and Nutrition Balance     [x]  Pain Control      3. Issues that 24 hour rehabilitation nursing is following:    [x]  Fall and safety precautions     []  Wound Care     [x]  Bowel and Bladder Function     [x]  Fluid Electrolyte and Nutrition Balance     [x]  Pain Control      [x]  Assistance with and education on in-room safety with transfers to and from the bed, wheelchair, toilet and shower. 4. Acute rehabilitation plan of care:    [x]  Continue current care and rehab. [x]  Physical Therapy           [x]  Occupational Therapy           []  Speech Therapy     []  Hold Rehab until further notice     5. Medications:    [x]  MAR Reviewed     [x]  Continue Present Medications     6. DVT Prophylaxis:      []  Enoxaparin     []  Unfractionated Heparin     []  Warfarin     [x]  NOAC     []  AMELIE Stockings     []  Sequential Compression Device     []  None     7. Code status    []  Full code     []  Partial code     []  Do not intubate     [x]  Do not resuscitate     8.  Orders:   > Multiple closed pelvic fractures (comminuted fracture involving the left anterior acetabular wall with involvement of the base of the left superior pubic ramus which are not significantly displaced; nondisplaced left inferior pubic ramus fracture)   > Orthopedic surgery recommending non-surgical management with PT, pain control   > Partial (50%) weightbearing on the left lower extremity     > Urinary tract infection, History of right ureteral stent   > Urine culture (collected 11/16/2021, resulted 11/20/2021) yielded growth of >100,000 colonies/ml of Proteus mirabilis RESISTANT to Nitrofurantoin   > Started on IV ceftriaxone and transitioned to cefpodoxime   > On discharge, patient is to follow up with Urology (Dr. Lisy Reed)   > On 11/25/2021, patient had completed the recommended treatment course of Cefpodoxime 200 mg PO q 24 hr     > History of VRE urinary tract infection, 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)   > Urine culture (collected 10/8/2021, resulted 10/14/2021) yielded growth of >100,000 colonies/ml of Enterococcus faecalis RESISTANT to Ciprofloxacin, Levofloxacin, Tetracycline and Vancomycin   > Contact isolation    > Paroxysmal atrial fibrillation, anticoagulated on Apixaban   > Anticoagulation    > Continue Apixaban 5 mg PO BID    > Rate-control    > None   > Maintenance of sinus rhythm    > Continue Amiodarone 200 mg PO once daily    > Glaucoma    > Continue Latanoprost 0.005% ophthalmic solution, 1 drop into each ete q PM    > Hypothyroidism   > TSH (11/13/2021) = 11.4   > TSH (11/22/2021) = 1.69   > Free T4 (11/22/2021) = 1.1   > Continue Levothyroxine 125 mcg PO once daily     > Chronic hypotension   > On 11/23/2021, decreased Midodrine from 10 mg to 5 mg PO TID with meals   > Continue Midodrine 5 mg PO TID with meals     > End-stage renal disease, on hemodialysis (Mon-Wed-Fri); Secondary hyperparathyroidism of renal origin   > Nephrology consult (Dr. Joyceann Carrel) called for evaluation of renal status and to arrange for hemodialysis while patient is in the ARU   > Continue:    > Ascorbic acid 500 mg PO once daily     > Cyanocobalamin 1,000 mcg PO once daily    > Vitamin B complex 1 tab PO once daily     > Doxercalciferol 4 mcg IV q Mon-Wed-Fri    > Epoetin caitlin 8,000 units SC q Mon-Wed-Fri     > 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, diet-controlled   > HbA1c (10/8/2021) = 4.6   > No need for blood glucose monitoring     > Depression   > Continue Duloxetine 20 mg PO once daily     > ? Allergy to albumin   > Will list as allergy per pt and daughter-in-law request.  Start antihistamine. Monitor.    > Hyperkalemia      11/24/21  0539 11/22/21  0527 11/19/21  0124 11/18/21  0143 11/17/21  0145 11/16/21  0232 11/15/21  0216 11/14/21  0424 11/13/21  0445 11/12/21  1520   K 5.7* 5.4 4.8 4.3 4.7 4.6 4.8 4.4 4.7 3.5      > On 11/24/2021, patient was given Sodium zirconium cyclosilicate 10 grams PO x 1 dose   > K (11/26/2021) = 5.1    > Hyperphosphatemia      11/26/21  0635 11/24/21  0539 11/22/21  0527 11/14/21  0424   PHOS 7.2* 8.1* 8.1* 6.0*      > On 11/26/2021, started Calcium acetate 667 mg PO TID with meals   > Continue Calcium acetate 667 mg PO TID with meals    > Analgesia / Chronic low back pain   > Continue:    > Acetaminophen 650 mg PO TID (8AM, 12PM, 4PM)    > Acetaminophen 650 mg PO q 4 hr PRN for pain level 4/10 or lesser (from 8PM to 4AM only)    > Duloxetine 20 mg PO once daily    > Gabapentin 200 mg PO q Mon-Wed-Fri    > Lidocaine 4% patch, 1 patch on affected area q 24 hr (12 hr on, 12 hr off)    > Hydromorphone 1 mg PO q 8 hr PRN for pain level 5/10 or greater     > Diet:   > Specifications: 3 carb choices (45 gm/meal); Low fat/Low cholesterol/High fiber/IRINEO; Low potassium (less than 3,000 mg/day); Low phosphorus (less than 1,000 mg/day)   > Solids (consistency): Regular   > Liquids (consistency):  Thin   > Fluid restriction: None       Signed:    Mojgan Webster MD    November 27, 2021

## 2021-11-27 NOTE — DIALYSIS
ACUTE HEMODIALYSIS FLOW SHEET    HEMODIALYSIS ORDERS: Physician: Dr. Vernon Thomas: [place]   Duration: 3 hr   BFR: 350   DFR: 600   Dialysate:  Temp 36-37*C   K+  2    Ca+ 2.5   Na 138   Bicarb 30   Wt Readings from Last 1 Encounters:   11/19/21 94.8 kg (209 lb 1.6 oz)    Patient Chart [x]   Unable to Obtain []  Dry weight/UF Goal: 2000 ml    Heparin []  Bolus    Units    [] Hourly    Units    [x]None       Pre BP: 118/62  Pulse: 70  Respirations: 18 Temp: 98.4  [x] Oral  [] Ax  [] Esoph   Labs: []  Pre  []  Post:   [x] N/A   Additional Orders (medications, blood products, hypotension management): [] Yes   [x] No     [x]  [facility] Consent Verified     CATHETER ACCESS:  [x]N/A   []Right   []Left   []IJ   []Fem  []Chest wall  []TransHepatic                    GRAFT/FISTULA ACCESS:   []N/A     []Right     [x]Left     [x]UE     []LE   []AVG   [x]AVF       [x]Medical Aseptic Prep Utilized   [x]No S/S infection  []Redness  []Drainage [] Cultured  [] Swelling  [] Pain  Bruit:   [x] Strong    [] Weak       Thrill :   [x] Strong    [] Weak     Needle Gauge: 15   Length: 1 inch   If access problem,  notified: []Yes     [x]N/A          GENERAL ASSESSMENT:    LUNGS:  Resp Rate 18   [] Clear  [] Coarse  [] Crackles  [] Wheezing  [x] Diminished                                                           [x] RLL   [x] LLL  [] RUL   [] LALI            Respirations:  [x]Easy  []Labored  []N/A  Cough:  []Productive  []Dry  []N/A               Therapy:  [x]RA   [] Ventilated   [] Intubated   [] Trach            O2 Device:  [] NC   [] NRB  [] Trach Mask  [] BiPaP  Flow:   l/min                                                    CARDIAC: [x] Regular      [] Irregular   [] Rhythm:not monitored          [] Monitored   [] Bedside   [] Remotely monitored       EDEMA: [x] None   []Generalized  [] Pitting [] 1+   [] 2 +   [] 3+    [] 4+        SKIN:   [] Hot     [] Cold    [x] Warm   [x] Dry    [] Diaphoretic [] Flushed  [] Jaundiced  [] Cyanotic  [] Pale      LOC:    [x] Alert      [x]Oriented:    [x] Person     [x] Place   [x]Time               [] Confused  [] Lethargic  [] Medicated  [] Non-responsive  [] Non-Verbal     GI / ABDOMEN:                     [] Flat    [] Distended    [] Soft    [] Firm   [x]  Obese                   [] Diarrhea   [] FMS [x] Bowel Sounds  [] Nausea  [] Vomiting                   [] NGT  [] OGT  [] PEG  [] Tube Feedings @     mL/hr     / URINE ASSESSMENT:                   [] Voiding    [] Oliguria  [x] Anuria                     []  Cline   [] Incontinent  []  Incontinent Brief   []  PureWick     PAIN:  [x] 0 []1  []2   []3   []4   []5   []6   []7   []8   []9   []10                MOBILITY:  [x] Bed    [] Stretcher      All Vitals and Treatment Details on Attached [address]: [facility]          Room # 178/01    [x] Routine         [] 1st Time Acute/Chronic   [] Urgent      [] Stat            [x] Acute Room   []  Bedside    [] ICU/CCU     [] ER     Isolation Precautions:  [x] Dialysis    Contact     ALLERGIES:     Allergies   Allergen Reactions    Albumin, Human 25 % Itching     Headache - severe migraine like, itchy eyes, runny nose    Ciprofloxacin Hives    Cyclopentolate Unknown (comments)    Iron Sucrose Diarrhea    Statins-Hmg-Coa Reductase Inhibitors Other (comments)     Body ache        Code Status:  DNR     Hepatitis Status      Lab Results   Component Value Date/Time    Hepatitis B surface Ag <0.10 11/17/2021 02:20 PM    Hepatitis B surface Ab 29.33 11/17/2021 02:20 PM        Current Labs:      Lab Results   Component Value Date/Time    WBC 8.0 11/26/2021 06:35 AM    Hemoglobin, POC 8.8 (L) 09/24/2020 08:45 AM    HGB 9.7 (L) 11/26/2021 06:35 AM    Hematocrit, POC 26 (L) 09/24/2020 08:45 AM    HCT 32.4 (L) 11/26/2021 06:35 AM    PLATELET 013 85/00/1915 06:35 AM    .3 (H) 11/26/2021 06:35 AM     Lab Results   Component Value Date/Time    Sodium 136 11/26/2021 06:35 AM    Potassium 5.1 11/26/2021 06:35 AM    Chloride 102 11/26/2021 06:35 AM    CO2 28 11/26/2021 06:35 AM    Anion gap 6 11/26/2021 06:35 AM    Glucose 101 (H) 11/26/2021 06:35 AM    BUN 48 (H) 11/26/2021 06:35 AM    Creatinine 6.38 (H) 11/26/2021 06:35 AM    BUN/Creatinine ratio 8 (L) 11/26/2021 06:35 AM    GFR est AA 8 (L) 11/26/2021 06:35 AM    GFR est non-AA 6 (L) 11/26/2021 06:35 AM    Calcium 9.4 11/26/2021 06:35 AM          DIET:  DIET ADULT ORAL NUTRITION SUPPLEMENT  DIET ADULT     PRIMARY NURSE REPORT:   Pre Dialysis: NICOLE Romero RN    Time: 200      EDUCATION:    [x] Patient           Knowledge Basis: []None [x]Minimal [] Substantial [] Unknown  Barriers to learning  [x]None  [] Intubated/Trached/Ventilated  [] Sedated/Paralyzed   [] Access Care     [] S&S of infection  [] Fluid Management  [] K+   [x] Procedural    [] Medications   [] Tx Options   [] Transplant   [] Diet      Teaching Tools:  [x] Explain  [] Demo  [] Handouts [] Video  Patient response: [x] Verbalized understanding   [] Requires follow up        [x] Time Out/Safety Check    [x] Extracorporeal Circuit Tested for integrity       RO/HEMODIALYSIS MACHINE SAFETY CHECKS - Before each treatment:        [facility]                                    [x] Unit Machine # 5 with centralized RO                                                                                                                           Alarm Test:  Pass time 1230            [x] RO/Machine Log Complete    Machine Temp    36-37*C             Dialysate: pH  7.4    Conductivity: Meter 14.0    HD Machine  14.0     TCD: 13.8  Dialyzer Lot # [id number]     Blood Tubing Lot # [id number]     Saline Lot # [id number]     CHLORINE TESTING-Before each treatment and every 4 hours    Total Chlorine: [x] less than 0.1 ppm  Initial Time Check: 1300       4 Hr/2nd Check Time: 1700   (if greater than 0.1 ppm from Primary then every 30 minutes from Secondary)     TREATMENT INITIATION - with Dialysis Precautions:   [x] All Connections Secured              [x] Saline Line Double Clamped   [x] Venous Parameters Set               [x] Arterial Parameters Set    [x] Prime Given 250ml NSS              [x]Air Foam Detector Engaged        Treatment Initiation Note:  1255--Pt arrived for dialysis. Pt denies any complaints. Pt assessed and is stable for dialysis. 1301--HD initiated without difficulty. During Treatment Notes:  1137  Face & Vascular access visible with art and nichelle line connections intact. Pt tolerating dialysis. 1330  Face & Vascular access visible with art and nichelle line connections intact. Pt tolerating dialysis. 1345  Face & Vascular access visible with art and nichelle line connections intact. Pt tolerating dialysis. 1400  Face & Vascular access visible with art and nichelle line connections intact. Pt tolerating dialysis. 1415  Face & Vascular access visible with art and nichelle line connections intact. Pt tolerating dialysis. 1430  Face & Vascular access visible with art and nichelle line connections intact. Pt tolerating dialysis. 1445  Face & Vascular access visible with art and nichelle line connections intact. Pt tolerating dialysis. 1500  Face & Vascular access visible with art and nichelle line connections intact. Pt tolerating dialysis. 1515  Face & Vascular access visible with art and nichelle line connections intact. Pt tolerating dialysis. 1530  Face & Vascular access visible with art and nichelle line connections intact. Pt tolerating dialysis. 1545  Face & Vascular access visible with art and nichelle line connections intact. Pt tolerating dialysis. 1600  Dialysis treatment complete.        Medication    Dose    Volume Route      Time       Jessicaita Nurse   hectorol 4mcg 2ml HD 1614 Loreto Chinchilla, RN      HD  Loreto Chinchilla, RN      HD  Loreto Chinchilla, RN     Post Assessment  Dialyzer Cleared:   [] Good  [x] Fair  [] Poor  Blood processed:  56.4 L  UF Removed:  2000 Ml    Post BP: 114/51 Pulse: 85  Respirations: 18   Temp: 97.8  [x] Oral  [] Ax  [] Esophageal   Lungs: [] Clear                [x] No change from initial assessment   Post Tx Vascular Access: [] N/A  AVF/AVG: Bleeding stopped with  Arterial Pressure for 10 min   Venous Pressure for 10 min      Cardiac:  [x] Regular   [] Irregular   Rhythm:  [] Monitored   [x] Not Monitored    CVC Catheter: [x] N/A   Edema:  [] None  [] Generalized                     Skin:[x] Warm  [x] Dry [] Diaphoretic               [] Flushed  [] Pale [] Cyanotic Pain:  [x]0  []1-2  []3-4  []5-6   []7-8  []9-10         Post Treatment Note:   Pt tolerated dialysis well. Arterial and venous needles removed and pressure applied until bleeding stopped. Dry dressings applied. Bruit/Thrill present above and below dressings. POST TREATMENT PRIMARY NURSE HANDOFF REPORT:   Post Dialysis: NICOLE Montiel RN        Time:  1509       Abbreviations: AVG-arterial venous graft, AVF-arterial venous fistula, IJ-Internal Jugular, Subcl-Subclavian, Fem-Femoral, Tx-treatment, AP/HR-apical heart rate, VSS- Vital Signs Stable, CVC- Central Venous Catheter, DFR-dialysate flow rate, BFR-blood flow rate, AP-arterial pressure, -venous pressure, UF-ultrafiltrate, TMP-transmembrane pressure, Jasper-Venous, Art-Arterial, RO-Reverse Osmosis

## 2021-11-27 NOTE — PROGRESS NOTES
Problem: Risk for Spread of Infection  Goal: Prevent transmission of infectious organism to others  Description: Prevent the transmission of infectious organisms to other patients, staff members, and visitors. Outcome: Progressing Towards Goal     Problem: Patient Education:  Go to Education Activity  Goal: Patient/Family Education  Outcome: Progressing Towards Goal     Problem: Falls - Risk of  Goal: *Absence of Falls  Description: Document Shad Salgado Fall Risk and appropriate interventions in the flowsheet. Outcome: Progressing Towards Goal  Note: Fall Risk Interventions:  Mobility Interventions: Bed/chair exit alarm, Patient to call before getting OOB, Utilize walker, cane, or other assistive device    Mentation Interventions: Adequate sleep, hydration, pain control, Bed/chair exit alarm, Increase mobility    Medication Interventions: Assess postural VS orthostatic hypotension, Bed/chair exit alarm, Patient to call before getting OOB, Teach patient to arise slowly, Utilize gait belt for transfers/ambulation    Elimination Interventions: Bed/chair exit alarm, Call light in reach, Patient to call for help with toileting needs, Stay With Me (per policy), Toilet paper/wipes in reach    History of Falls Interventions: Bed/chair exit alarm, Room close to nurse's station, Utilize gait belt for transfer/ambulation, Investigate reason for fall         Problem: Patient Education: Go to Patient Education Activity  Goal: Patient/Family Education  Outcome: Progressing Towards Goal     Problem: Pressure Injury - Risk of  Goal: *Prevention of pressure injury  Description: Document Giovany Scale and appropriate interventions in the flowsheet.   Outcome: Progressing Towards Goal  Note: Pressure Injury Interventions:  Sensory Interventions: Assess changes in LOC, Avoid rigorous massage over bony prominences, Chair cushion, Keep linens dry and wrinkle-free, Maintain/enhance activity level, Pressure redistribution bed/mattress (bed type)    Moisture Interventions: Absorbent underpads, Minimize layers, Maintain skin hydration (lotion/cream)    Activity Interventions: Chair cushion, Pressure redistribution bed/mattress(bed type)    Mobility Interventions: Chair cushion, Pressure redistribution bed/mattress (bed type), HOB 30 degrees or less    Nutrition Interventions: Document food/fluid/supplement intake                     Problem: Patient Education: Go to Patient Education Activity  Goal: Patient/Family Education  Outcome: Progressing Towards Goal     Problem: Patient Education: Go to Patient Education Activity  Goal: Patient/Family Education  Outcome: Progressing Towards Goal     Problem: Pain  Goal: *Control of Pain  Outcome: Progressing Towards Goal     Problem: Patient Education: Go to Patient Education Activity  Goal: Patient/Family Education  Outcome: Progressing Towards Goal

## 2021-11-28 PROCEDURE — 74011250637 HC RX REV CODE- 250/637: Performed by: FAMILY MEDICINE

## 2021-11-28 PROCEDURE — 97110 THERAPEUTIC EXERCISES: CPT

## 2021-11-28 PROCEDURE — 74011000250 HC RX REV CODE- 250: Performed by: INTERNAL MEDICINE

## 2021-11-28 PROCEDURE — 2709999900 HC NON-CHARGEABLE SUPPLY

## 2021-11-28 PROCEDURE — 74011250637 HC RX REV CODE- 250/637: Performed by: INTERNAL MEDICINE

## 2021-11-28 PROCEDURE — 65310000000 HC RM PRIVATE REHAB

## 2021-11-28 RX ADMIN — MIDODRINE HYDROCHLORIDE 5 MG: 5 TABLET ORAL at 12:35

## 2021-11-28 RX ADMIN — CALCIUM ACETATE 667 MG: 667 CAPSULE ORAL at 12:35

## 2021-11-28 RX ADMIN — Medication 500 MG: at 08:20

## 2021-11-28 RX ADMIN — ONDANSETRON 4 MG: 4 TABLET, ORALLY DISINTEGRATING ORAL at 23:23

## 2021-11-28 RX ADMIN — CETIRIZINE HYDROCHLORIDE 10 MG: 10 TABLET, FILM COATED ORAL at 08:20

## 2021-11-28 RX ADMIN — LATANOPROST 1 DROP: 50 SOLUTION OPHTHALMIC at 21:08

## 2021-11-28 RX ADMIN — PANTOPRAZOLE SODIUM 20 MG: 20 TABLET, DELAYED RELEASE ORAL at 07:00

## 2021-11-28 RX ADMIN — MIDODRINE HYDROCHLORIDE 5 MG: 5 TABLET ORAL at 08:20

## 2021-11-28 RX ADMIN — LIDOCAINE AND PRILOCAINE: 25; 25 CREAM TOPICAL at 08:24

## 2021-11-28 RX ADMIN — CHOLECALCIFEROL TAB 25 MCG (1000 UNIT) 2000 UNITS: 25 TAB at 17:23

## 2021-11-28 RX ADMIN — CALCIUM ACETATE 667 MG: 667 CAPSULE ORAL at 17:23

## 2021-11-28 RX ADMIN — Medication 1 CAPSULE: at 08:20

## 2021-11-28 RX ADMIN — AMIODARONE HYDROCHLORIDE 200 MG: 200 TABLET ORAL at 08:20

## 2021-11-28 RX ADMIN — APIXABAN 5 MG: 5 TABLET, FILM COATED ORAL at 17:23

## 2021-11-28 RX ADMIN — LEVOTHYROXINE SODIUM 125 MCG: 125 TABLET ORAL at 07:00

## 2021-11-28 RX ADMIN — HYDROMORPHONE HYDROCHLORIDE 1 MG: 2 TABLET ORAL at 09:48

## 2021-11-28 RX ADMIN — HYDROMORPHONE HYDROCHLORIDE 1 MG: 2 TABLET ORAL at 21:04

## 2021-11-28 RX ADMIN — CYANOCOBALAMIN TAB 1000 MCG 1000 MCG: 1000 TAB at 08:20

## 2021-11-28 RX ADMIN — ACETAMINOPHEN 650 MG: 325 TABLET ORAL at 17:23

## 2021-11-28 RX ADMIN — CALCIUM ACETATE 667 MG: 667 CAPSULE ORAL at 08:21

## 2021-11-28 RX ADMIN — APIXABAN 5 MG: 5 TABLET, FILM COATED ORAL at 08:20

## 2021-11-28 RX ADMIN — ACETAMINOPHEN 650 MG: 325 TABLET ORAL at 08:20

## 2021-11-28 RX ADMIN — Medication 1 TABLET: at 08:21

## 2021-11-28 RX ADMIN — ACETAMINOPHEN 650 MG: 325 TABLET ORAL at 12:35

## 2021-11-28 RX ADMIN — LIDOCAINE AND PRILOCAINE: 25; 25 CREAM TOPICAL at 12:35

## 2021-11-28 RX ADMIN — DULOXETINE HYDROCHLORIDE 20 MG: 20 CAPSULE, DELAYED RELEASE ORAL at 08:20

## 2021-11-28 RX ADMIN — CHOLECALCIFEROL TAB 25 MCG (1000 UNIT) 2000 UNITS: 25 TAB at 08:20

## 2021-11-28 NOTE — ROUTINE PROCESS
SHIFT CHANGE NOTE FOR [facility]    Bedside and Verbal shift change report given to [facility]) by Venita Boas, RN (offgoing nurse). Report included the following information SBAR, Kardex, MAR and Recent Results.     Situation:   Code Status: DNR   Hospital Day: 9   Problem List:   Hospital Problems  Date Reviewed: 11/27/2021          Codes Class Noted POA    * (Principal) Multiple closed pelvic fractures without disruption of pelvic ring ([address] ICD-10-CM: D00.68YN  ICD-9-CM: 808.44  11/19/2021 Yes        Hyperphosphatemia ICD-10-CM: E83.39  ICD-9-CM: 275.3  11/14/2021 Yes        Anticoagulated by anticoagulation treatment ICD-10-CM: Z79.01  ICD-9-CM: V58.61  Unknown Yes    Overview Signed 11/23/2021  9:49 AM by Leopoldo Spates, MD     On Apixaban             Paroxysmal atrial fibrillation (HCC) (Chronic) ICD-10-CM: I48.0  ICD-9-CM: 427.31  Unknown Yes        Closed fracture of left inferior pubic ramus, with routine healing, subsequent encounter ICD-10-CM: S32.592D  ICD-9-CM: V54.13  11/12/2021 Yes        Closed nondisplaced fracture of anterior wall of left acetabulum with routine healing ICD-10-CM: S32.415D  ICD-9-CM: V54.19  11/12/2021 Yes        Closed fracture of superior pubic ramus, left, with routine healing, subsequent encounter ICD-10-CM: S32.512D  ICD-9-CM: V54.19  11/12/2021 Yes        History of infection with vancomycin resistant Enterococcus (VRE) ICD-10-CM: Z86.19  ICD-9-CM: V12.09  10/8/2021 Yes    Overview Signed 11/23/2021  9:51 AM by Leopoldo Spates, MD     Urine culture (collected 10/8/2021, resulted 10/14/2021) yielded growth of >100,000 colonies/ml of Enterococcus faecalis RESISTANT to Ciprofloxacin, Levofloxacin, Tetracycline and Vancomycin             History of urinary tract infection ICD-10-CM: Z87.440  ICD-9-CM: V13.02  10/8/2021 Yes    Overview Signed 11/23/2021  9:52 AM by Leopoldo Spates, MD     Urine culture (collected 10/8/2021, resulted 10/14/2021) yielded growth of >100,000 colonies/ml of Enterococcus faecalis RESISTANT to Ciprofloxacin, Levofloxacin, Tetracycline and Vancomycin             Need for prophylactic isolation ICD-10-CM: Z41.8  ICD-9-CM: V07.0  10/8/2021 Yes    Overview Signed 11/23/2021  9:52 AM by Cherylene Falter, MD     Urine culture (collected 10/8/2021, resulted 10/14/2021) yielded growth of >100,000 colonies/ml of Enterococcus faecalis RESISTANT to Ciprofloxacin, Levofloxacin, Tetracycline and Vancomycin             End-stage renal disease on hemodialysis (HCC) (Chronic) ICD-10-CM: N18.6, Z99.2  ICD-9-CM: 585.6, V45.11  Unknown Yes    Overview Signed 11/23/2021  9:56 AM by Cherylene Falter, MD     HD at [facility] on MWF. 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 ([facility] [address] (Chronic) ICD-10-CM: E11.22, N18.6  ICD-9-CM: 250.40, 585.6  Unknown Yes    Overview Signed 11/23/2021  9:50 AM by Cherylene Falter, MD     HbA1c (10/8/2021) = 4.6             Chronic hypotension (Chronic) ICD-10-CM: I95.89  ICD-9-CM: 458.1  Unknown Yes    Overview Signed 11/23/2021 10:01 AM by Cherylene Falter, MD     On Midodrine                   Background:   Past Medical History:   Past Medical History:   Diagnosis Date    Anemia in end-stage renal disease ([facility] [address]     Anticoagulated by anticoagulation treatment     On Apixaban    Chronic hypotension     On Midodrine    Chronic pain     Closed fracture of left inferior pubic ramus, with routine healing, subsequent encounter 11/12/2021    Closed fracture of superior pubic ramus, left, with routine healing, subsequent encounter 11/12/2021    Closed nondisplaced fracture of anterior wall of left acetabulum with routine healing 11/12/2021    Depression     End-stage renal disease on hemodialysis ([facility] [address]     HD at [facility] on MWF.  Tel # [phone]    Gastroesophageal reflux disease     Glaucoma     History of acute pyelonephritis 2/20/2020    History of hydronephrosis 10/5/2021    History of infection with vancomycin resistant Enterococcus (VRE) 10/8/2021    Urine culture (collected 10/8/2021, resulted 10/14/2021) yielded growth of >100,000 colonies/ml of Enterococcus faecalis RESISTANT to Ciprofloxacin, Levofloxacin, Tetracycline and Vancomycin    History of kidney stones     History of recurrent urinary tract infection     History of sepsis 6/18/2021    History of septic shock 10/8/2021    History of urethral stricture     History of urinary tract infection 10/8/2021    Urine culture (collected 10/8/2021, resulted 10/14/2021) yielded growth of >100,000 colonies/ml of Enterococcus faecalis RESISTANT to Ciprofloxacin, Levofloxacin, Tetracycline and Vancomycin    Hyperlipidemia     Hyperphosphatemia 11/14/2021    Hypothyroidism     Lung mass     Need for prophylactic isolation 10/8/2021    Urine culture (collected 10/8/2021, resulted 10/14/2021) yielded growth of >100,000 colonies/ml of Enterococcus faecalis RESISTANT to Ciprofloxacin, Levofloxacin, Tetracycline and Vancomycin    Paroxysmal atrial fibrillation ([facility] [address]     Secondary hyperparathyroidism of renal origin ([facility] [address]     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 ([facility] [address]     HbA1c (10/8/2021) = 4.6    Uric acid nephrolithiasis     Urinary incontinence         Assessment:   Changes in Assessment throughout shift: No change to previous assessment     Patient has a central line: no Patient has Cline Cath: no    Last Vitals:     Vitals:    11/27/21 0807 11/27/21 1223 11/27/21 1558 11/27/21 2017   BP: 126/60 (!) 143/76 (!) 102/55 137/70   Pulse: 76 73 73 76   Resp: 18  16 18   Temp: 96.9 °F (36.1 °C)  97.2 °F (36.2 °C) 97 °F (36.1 °C)   TempSrc:       SpO2: 98%  100% 97%   Height:            PAIN    Pain Assessment    Pain Intensity 1: 0 (11/28/21 0445) Pain Intensity 1: 2 (12/29/14 1105)    Pain Location 1: Hip Pain Location 1: Abdomen    Pain Intervention(s) 1: Medication (see MAR) Pain Intervention(s) 1: Medication (see MAR)  Patient Stated Pain Goal: 0 Patient Stated Pain Goal: 0  o Intervention effective: yes  o Other actions taken for pain: Medication (see MAR)     Skin Assessment  Skin color Skin Color: Appropriate for ethnicity  Condition/Temperature Skin Condition/Temp: Dry, Warm  Integrity Skin Integrity: Intact  Turgor Turgor: Non-tenting  Weekly Pressure Ulcer Documentation  Pressure  Injury Documentation: No Pressure Injury Noted-Pressure Ulcer Prevention Initiated  Wound Prevention & Protection Methods  Orientation of wound Orientation of Wound Prevention: Posterior  Location of Prevention Location of Wound Prevention: Buttocks, Sacrum/Coccyx  Dressing Present Dressing Present : No  Dressing Status    Wound Offloading Wound Offloading (Prevention Methods): Bed, pressure redistribution/air, Pillows, Wheelchair     INTAKE/OUPUT  Date 11/27/21 0700 - 11/28/21 0659 11/28/21 0700 - 11/29/21 0659   Shift 8663-7012 5111-7145 24 Hour Total 8357-6956 7899-0356 24 Hour Total   INTAKE   P.O. 600  600        P. O. 600  600      Shift Total 600  600      OUTPUT   Urine           Urine Occurrence(s) 1 x 0 x 1 x      Stool           Stool Occurrence(s) 1 x 0 x 1 x      Shift Total           600      Weight (kg)             Recommendations:  Patient needs and requests: none  Pending tests/procedures: none  1. Functional Level/Equipment: Partial (one person) / Wheelchair    Fall Precautions:   Fall risk precautions were reinforced with the patient; she was instructed to call for help prior to getting up. The following fall risk precautions were continued: bed/ chair alarms, door signage, yellow bracelet and socks as well as update of the Jessica Blood tool in the patient's room. Freddy Score: 3    HEALS Safety Check    A safety check occurred in the patient's room between off going nurse and oncoming nurse listed above.     The safety check included the below items  Area Items   H  High Alert Medications - Verify all high alert medication drips (heparin, PCA, etc.)   E  Equipment - Suction is set up for ALL patients (with adelaide)  - Red plugs utilized for all equipment (IV pumps, etc.)  - WOWs wiped down at end of shift.  - Room stocked with oxygen, suction, and other unit-specific supplies   A  Alarms - Bed alarm is set for fall risk patients  - Ensure chair alarm is in place and activated if patient is up in a chair   L  Lines - Check IV for any infiltration  - Cline bag is empty if patient has a Cline   - Tubing and IV bags are labeled   S  Safety   - Room is clean, patient is clean, and equipment is clean. - Hallways are clear from equipment besides carts. - Fall bracelet on for fall risk patients  - Ensure room is clear and free of clutter  - Suction is set up for ALL patients (with adelaide)  - Hallways are clear from equipment besides carts.    - Isolation precautions followed, supplies available outside room, sign posted     Lucy Evangelista RN

## 2021-11-28 NOTE — PROGRESS NOTES
Problem: Risk for Spread of Infection  Goal: Prevent transmission of infectious organism to others  Description: Prevent the transmission of infectious organisms to other patients, staff members, and visitors. Outcome: Progressing Towards Goal     Problem: Patient Education:  Go to Education Activity  Goal: Patient/Family Education  Outcome: Progressing Towards Goal     Problem: Falls - Risk of  Goal: *Absence of Falls  Description: Document Umesh Conti Fall Risk and appropriate interventions in the flowsheet. Outcome: Progressing Towards Goal  Note: Fall Risk Interventions:  Mobility Interventions: Bed/chair exit alarm, Utilize walker, cane, or other assistive device    Mentation Interventions: Adequate sleep, hydration, pain control, Bed/chair exit alarm    Medication Interventions: Assess postural VS orthostatic hypotension, Bed/chair exit alarm, Teach patient to arise slowly    Elimination Interventions: Bed/chair exit alarm, Call light in reach, Patient to call for help with toileting needs    History of Falls Interventions: Bed/chair exit alarm, Door open when patient unattended, Room close to nurse's station, Investigate reason for fall, Utilize gait belt for transfer/ambulation         Problem: Patient Education: Go to Patient Education Activity  Goal: Patient/Family Education  Outcome: Progressing Towards Goal     Problem: Pressure Injury - Risk of  Goal: *Prevention of pressure injury  Description: Document Giovany Scale and appropriate interventions in the flowsheet.   Outcome: Progressing Towards Goal  Note: Pressure Injury Interventions:  Sensory Interventions: Assess changes in LOC, Avoid rigorous massage over bony prominences, Chair cushion, Keep linens dry and wrinkle-free, Pressure redistribution bed/mattress (bed type)    Moisture Interventions: Absorbent underpads, Apply protective barrier, creams and emollients, Maintain skin hydration (lotion/cream)    Activity Interventions: Chair cushion, Increase time out of bed, Pressure redistribution bed/mattress(bed type)    Mobility Interventions: Chair cushion, HOB 30 degrees or less, Pressure redistribution bed/mattress (bed type)    Nutrition Interventions: Document food/fluid/supplement intake                     Problem: Patient Education: Go to Patient Education Activity  Goal: Patient/Family Education  Outcome: Progressing Towards Goal     Problem: Patient Education: Go to Patient Education Activity  Goal: Patient/Family Education  Outcome: Progressing Towards Goal     Problem: Patient Education: Go to Patient Education Activity  Goal: Patient/Family Education  Outcome: Progressing Towards Goal     Problem: Pain  Goal: *Control of Pain  Outcome: Progressing Towards Goal     Problem: Patient Education: Go to Patient Education Activity  Goal: Patient/Family Education  Outcome: Progressing Towards Goal

## 2021-11-28 NOTE — ROUTINE PROCESS
SHIFT CHANGE NOTE FOR [facility]    Bedside and Verbal shift change report given to Kushal Thurston RN (oncoming nurse) by Morgan Barcenas (offgoing nurse). Report included the following information SBAR, Kardex, MAR and Recent Results.     Situation:   Code Status: DNR   Hospital Day: 9   Problem List:   Hospital Problems  Date Reviewed: 11/27/2021          Codes Class Noted POA    * (Principal) Multiple closed pelvic fractures without disruption of pelvic ring ([facility] [address] ICD-10-CM: Y69.69WR  ICD-9-CM: 808.44  11/19/2021 Yes        Hyperphosphatemia ICD-10-CM: E83.39  ICD-9-CM: 275.3  11/14/2021 Yes        Anticoagulated by anticoagulation treatment ICD-10-CM: Z79.01  ICD-9-CM: V58.61  Unknown Yes    Overview Signed 11/23/2021  9:49 AM by Zack Díaz MD     On Apixaban             Paroxysmal atrial fibrillation (HCC) (Chronic) ICD-10-CM: I48.0  ICD-9-CM: 427.31  Unknown Yes        Closed fracture of left inferior pubic ramus, with routine healing, subsequent encounter ICD-10-CM: S32.592D  ICD-9-CM: V54.13  11/12/2021 Yes        Closed nondisplaced fracture of anterior wall of left acetabulum with routine healing ICD-10-CM: S32.415D  ICD-9-CM: V54.19  11/12/2021 Yes        Closed fracture of superior pubic ramus, left, with routine healing, subsequent encounter ICD-10-CM: S32.512D  ICD-9-CM: V54.19  11/12/2021 Yes        History of infection with vancomycin resistant Enterococcus (VRE) ICD-10-CM: Z86.19  ICD-9-CM: V12.09  10/8/2021 Yes    Overview Signed 11/23/2021  9:51 AM by Zack Díaz MD     Urine culture (collected 10/8/2021, resulted 10/14/2021) yielded growth of >100,000 colonies/ml of Enterococcus faecalis RESISTANT to Ciprofloxacin, Levofloxacin, Tetracycline and Vancomycin             History of urinary tract infection ICD-10-CM: Z87.440  ICD-9-CM: V13.02  10/8/2021 Yes    Overview Signed 11/23/2021  9:52 AM by Zack Officer, MD     Urine culture (collected 10/8/2021, resulted 10/14/2021) yielded growth of >100,000 colonies/ml of Enterococcus faecalis RESISTANT to Ciprofloxacin, Levofloxacin, Tetracycline and Vancomycin             Need for prophylactic isolation ICD-10-CM: Z41.8  ICD-9-CM: V07.0  10/8/2021 Yes    Overview Signed 11/23/2021  9:52 AM by Danyel To MD     Urine culture (collected 10/8/2021, resulted 10/14/2021) yielded growth of >100,000 colonies/ml of Enterococcus faecalis RESISTANT to Ciprofloxacin, Levofloxacin, Tetracycline and Vancomycin             End-stage renal disease on hemodialysis (HCC) (Chronic) ICD-10-CM: N18.6, Z99.2  ICD-9-CM: 585.6, V45.11  Unknown Yes    Overview Signed 11/23/2021  9:56 AM by Danyel To MD     HD at [facility] on MWF. 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 ([facility] [address] (Chronic) ICD-10-CM: E11.22, N18.6  ICD-9-CM: 250.40, 585.6  Unknown Yes    Overview Signed 11/23/2021  9:50 AM by Danyel To MD     HbA1c (10/8/2021) = 4.6             Chronic hypotension (Chronic) ICD-10-CM: I95.89  ICD-9-CM: 458.1  Unknown Yes    Overview Signed 11/23/2021 10:01 AM by Danyel To MD     On Midodrine                   Background:   Past Medical History:   Past Medical History:   Diagnosis Date    Anemia in end-stage renal disease ([facility] [address]     Anticoagulated by anticoagulation treatment     On Apixaban    Chronic hypotension     On Midodrine    Chronic pain     Closed fracture of left inferior pubic ramus, with routine healing, subsequent encounter 11/12/2021    Closed fracture of superior pubic ramus, left, with routine healing, subsequent encounter 11/12/2021    Closed nondisplaced fracture of anterior wall of left acetabulum with routine healing 11/12/2021    Depression     End-stage renal disease on hemodialysis ([facility] [address]     HD at [facility] on MWF.  Tel # [phone]    Gastroesophageal reflux disease     Glaucoma     History of acute pyelonephritis 2/20/2020    History of hydronephrosis 10/5/2021    History of infection with vancomycin resistant Enterococcus (VRE) 10/8/2021    Urine culture (collected 10/8/2021, resulted 10/14/2021) yielded growth of >100,000 colonies/ml of Enterococcus faecalis RESISTANT to Ciprofloxacin, Levofloxacin, Tetracycline and Vancomycin    History of kidney stones     History of recurrent urinary tract infection     History of sepsis 6/18/2021    History of septic shock 10/8/2021    History of urethral stricture     History of urinary tract infection 10/8/2021    Urine culture (collected 10/8/2021, resulted 10/14/2021) yielded growth of >100,000 colonies/ml of Enterococcus faecalis RESISTANT to Ciprofloxacin, Levofloxacin, Tetracycline and Vancomycin    Hyperlipidemia     Hyperphosphatemia 11/14/2021    Hypothyroidism     Lung mass     Need for prophylactic isolation 10/8/2021    Urine culture (collected 10/8/2021, resulted 10/14/2021) yielded growth of >100,000 colonies/ml of Enterococcus faecalis RESISTANT to Ciprofloxacin, Levofloxacin, Tetracycline and Vancomycin    Paroxysmal atrial fibrillation ([facility] [address]     Secondary hyperparathyroidism of renal origin ([facility] [address]     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 ([facility] [address]     HbA1c (10/8/2021) = 4.6    Uric acid nephrolithiasis     Urinary incontinence         Assessment:   Changes in Assessment throughout shift: No change to previous assessment    Patient has a central line: no   Patient has Cline Cath: no     Last Vitals:     Vitals:    11/27/21 0807 11/27/21 1223 11/27/21 1558 11/27/21 2017   BP: 126/60 (!) 143/76 (!) 102/55 137/70   Pulse: 76 73 73 76   Resp: 18  16 18   Temp: 96.9 °F (36.1 °C)  97.2 °F (36.2 °C) 97 °F (36.1 °C)   TempSrc:       SpO2: 98%  100% 97%   Height:            PAIN    Pain Assessment    Pain Intensity 1: 0 (11/28/21 0445) Pain Intensity 1: 2 (12/29/14 1105)    Pain Location 1: Hip Pain Location 1: Abdomen    Pain Intervention(s) 1: Medication (see MAR) Pain Intervention(s) 1: Medication (see MAR)  Patient Stated Pain Goal: 0 Patient Stated Pain Goal: 0  o Intervention effective: yes  o Other actions taken for pain: Medication (see MAR)     Skin Assessment  Skin color Skin Color: Appropriate for ethnicity  Condition/Temperature Skin Condition/Temp: Dry, Warm  Integrity Skin Integrity: Intact  Turgor Turgor: Non-tenting  Weekly Pressure Ulcer Documentation  Pressure  Injury Documentation: No Pressure Injury Noted-Pressure Ulcer Prevention Initiated  Wound Prevention & Protection Methods  Orientation of wound Orientation of Wound Prevention: Posterior  Location of Prevention Location of Wound Prevention: Buttocks, Sacrum/Coccyx  Dressing Present Dressing Present : No  Dressing Status    Wound Offloading Wound Offloading (Prevention Methods): Bed, pressure redistribution/air, Pillows, Wheelchair     INTAKE/OUPUT  Date 11/27/21 0700 - 11/28/21 0659 11/28/21 0700 - 11/29/21 0659   Shift 0179-4322 0315-6560 24 Hour Total 2329-5221 8583-5782 24 Hour Total   INTAKE   P.O. 600  600        P. O. 600  600      Shift Total 600  600      OUTPUT   Urine           Urine Occurrence(s) 1 x 0 x 1 x      Stool           Stool Occurrence(s) 1 x 0 x 1 x      Shift Total           600      Weight (kg)             Recommendations:  1. Patient needs and requests: None at this time    2. Pending tests/procedures: Dialysis; Routine labs     3. Functional Level/Equipment: Partial (one person) / Wheelchair    Fall Precautions:   Fall risk precautions were reinforced with the patient; she was instructed to call for help prior to getting up. The following fall risk precautions were continued: bed/ chair alarms, door signage, yellow bracelet and socks as well as update of the [employer] tool in the patient's room. Freddy Score: 3    HEALS Safety Check    A safety check occurred in the patient's room between off going nurse and oncoming nurse listed above.     The safety check included the below items  Area Items   H  High Alert Medications - Verify all high alert medication drips (heparin, PCA, etc.)   E  Equipment - Suction is set up for ALL patients (with adelaide)  - Red plugs utilized for all equipment (IV pumps, etc.)  - WOWs wiped down at end of shift.  - Room stocked with oxygen, suction, and other unit-specific supplies   A  Alarms - Bed alarm is set for fall risk patients  - Ensure chair alarm is in place and activated if patient is up in a chair   L  Lines - Check IV for any infiltration  - Cline bag is empty if patient has a Cline   - Tubing and IV bags are labeled   S  Safety   - Room is clean, patient is clean, and equipment is clean. - Hallways are clear from equipment besides carts. - Fall bracelet on for fall risk patients  - Ensure room is clear and free of clutter  - Suction is set up for ALL patients (with adelaide)  - Hallways are clear from equipment besides carts.    - Isolation precautions followed, supplies available outside room, sign posted     Reynaldo Lewis

## 2021-11-28 NOTE — PROGRESS NOTES
[facility] PHYSICAL REHABILITATION  [address] [place] [address], [address]     INPATIENT REHABILITATION  DAILY PROGRESS NOTE     Date: 11/28/2021    Name: Zee Del Valle Age / Sex: 64 y.o. / female   CSN: [id number] MRN: [id number]   [facility] Date: 11/19/2021 Length of Stay: 9 days     Primary Rehab Diagnosis: Impaired Mobility and ADLs secondary to Multiple closed pelvic fractures (comminuted fracture involving the left anterior acetabular wall with involvement of the base of the left superior pubic ramus which are not significantly displaced; nondisplaced left inferior pubic ramus fracture)      Subjective:     No new issues or problems reported. Blood pressure controlled.        Objective:     Vital Signs:  Patient Vitals for the past 24 hrs:   BP Temp Pulse Resp SpO2   11/28/21 0758 128/65 97 °F (36.1 °C) 84 18 99 %   11/27/21 2017 137/70 97 °F (36.1 °C) 76 18 97 %   11/27/21 1558 (!) 102/55 97.2 °F (36.2 °C) 73 16 100 %   11/27/21 1223 (!) 143/76 -- 73 -- --        Current Medications:  Current Facility-Administered Medications   Medication Dose Route Frequency    calcium acetate(phosphat bind) (PHOSLO) capsule 667 mg  1 Capsule Oral TID WITH MEALS    lidocaine-prilocaine (EMLA) 2.5-2.5 % cream   Topical BID    midodrine (PROAMATINE) tablet 5 mg  5 mg Oral TID WITH MEALS    allopurinoL (ZYLOPRIM) tablet 100 mg  100 mg Oral Q MON, WED & FRI    epoetin caitlin-epbx (RETACRIT) injection 8,000 Units  8,000 Units SubCUTAneous Q MON, WED & FRI    cetirizine (ZYRTEC) tablet 10 mg  10 mg Oral DAILY    acetaminophen (TYLENOL) tablet 650 mg  650 mg Oral Q4H PRN    bisacodyL (DULCOLAX) tablet 10 mg  10 mg Oral Q48H PRN    amiodarone (CORDARONE) tablet 200 mg  200 mg Oral DAILY    acetaminophen (TYLENOL) tablet 650 mg  650 mg Oral TID    apixaban (ELIQUIS) tablet 5 mg  5 mg Oral BID    HYDROmorphone (DILAUDID) tablet 1 mg  1 mg Oral Q8H PRN    meclizine (ANTIVERT) tablet 12.5 mg  12.5 mg Oral TID PRN    DULoxetine (CYMBALTA) capsule 20 mg  20 mg Oral DAILY    vitamin B complex-folic acid 0.4 mg tablet  1 Tablet Oral DAILY    cyanocobalamin tablet 1,000 mcg  1,000 mcg Oral DAILY    L. acidophilus,casei,rhamnosus (BIO-K PLUS) capsule 1 Capsule  1 Capsule Oral DAILY    ascorbic acid (vitamin C) (VITAMIN C) tablet 500 mg  500 mg Oral DAILY    cholecalciferol (VITAMIN D3) (1000 Units /25 mcg) tablet 2,000 Units  2,000 Units Oral BID    latanoprost (XALATAN) 0.005 % ophthalmic solution 1 Drop  1 Drop Both Eyes QPM    levothyroxine (SYNTHROID) tablet 125 mcg  125 mcg Oral 6am    pantoprazole (PROTONIX) tablet 20 mg  20 mg Oral ACB    ondansetron (ZOFRAN ODT) tablet 4 mg  4 mg Oral TID PRN    lidocaine 4 % patch 1 Patch  1 Patch TransDERmal Q24H    gabapentin (NEURONTIN) capsule 200 mg  200 mg Oral Q MON, WED & FRI    doxercalciferoL (HECTOROL) 4 mcg/2 mL injection 4 mcg  4 mcg IntraVENous DIALYSIS MON, WED & FRI       Allergies: Allergies   Allergen Reactions    Albumin, Human 25 % Itching     Headache - severe migraine like, itchy eyes, runny nose    Ciprofloxacin Hives    Cyclopentolate Unknown (comments)    Iron Sucrose Diarrhea    Statins-Hmg-Coa Reductase Inhibitors Other (comments)     Body ache       Lab/Data Review:  No results found for this or any previous visit (from the past 24 hour(s)). Assessment:     Primary Rehabilitation Diagnosis  1.  Impaired Mobility and ADLs  2. 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 ([facility]) E11.22, N18.6    Secondary hyperparathyroidism of renal origin ([facility] [address] N25.81    Gastroesophageal reflux disease K21.9    History of recurrent urinary tract infection Z87.440    History of sepsis Z86.19    End-stage renal disease on hemodialysis (HCC) N18.6, Z99.2    History of hydronephrosis Z87.448    History of septic shock Z86.19    Anticoagulated by anticoagulation treatment Z79.01    Paroxysmal atrial fibrillation ([facility]) I48.0    Closed fracture of left inferior pubic ramus, with routine healing, subsequent encounter S32.592D    Closed nondisplaced fracture of anterior wall of left acetabulum with routine healing S32.415D    Closed fracture of superior pubic ramus, left, with routine healing, subsequent encounter S32.512D    Multiple closed pelvic fractures without disruption of pelvic ring ([facility] [address] S32.82XA    Depression F32. A    History of urinary tract infection Z87.440    Need for prophylactic isolation Z41.8    Hyperlipidemia E78.5    Hypothyroidism E03.9    History of kidney stones Z87.442    Chronic pain G89.29    Lung mass R91.8    History of urethral stricture Z87.448    Uric acid nephrolithiasis N20.0    Urinary incontinence R32    Glaucoma H40.9    Hyperphosphatemia E83.39    Mononeuropathy G58.9       Plan:     1. Justification for continued stay: Good progression towards established rehabilitation goals. 2. Medical Issues being followed closely:    [x]  Fall and safety precautions     []  Wound Care     [x]  Bowel and Bladder Function     [x]  Fluid Electrolyte and Nutrition Balance     [x]  Pain Control      3.  Issues that 24 hour rehabilitation nursing is following:    [x]  Fall and safety precautions     []  Wound Care     [x]  Bowel and Bladder Function     [x]  Fluid Electrolyte and Nutrition Balance     [x]  Pain Control      [x]  Assistance with and education on in-room safety with transfers to and from the bed, wheelchair, toilet and shower. 4. Acute rehabilitation plan of care:    [x]  Continue current care and rehab. [x]  Physical Therapy           [x]  Occupational Therapy           []  Speech Therapy     []  Hold Rehab until further notice     5. Medications:    [x]  MAR Reviewed     [x]  Continue Present Medications     6. DVT Prophylaxis:      []  Enoxaparin     []  Unfractionated Heparin     []  Warfarin     [x]  NOAC     []  AMELIE Stockings     []  Sequential Compression Device     []  None     7. Code status    []  Full code     []  Partial code     []  Do not intubate     [x]  Do not resuscitate     8.  Orders:   > Multiple closed pelvic fractures (comminuted fracture involving the left anterior acetabular wall with involvement of the base of the left superior pubic ramus which are not significantly displaced; nondisplaced left inferior pubic ramus fracture)   > Orthopedic surgery recommending non-surgical management with PT, pain control   > Partial (50%) weightbearing on the left lower extremity     > Urinary tract infection, History of right ureteral stent   > Urine culture (collected 11/16/2021, resulted 11/20/2021) yielded growth of >100,000 colonies/ml of Proteus mirabilis RESISTANT to Nitrofurantoin   > Started on IV ceftriaxone and transitioned to cefpodoxime   > On discharge, patient is to follow up with Urology (Dr. Sandie Quigley)   > On 11/25/2021, patient had completed the recommended treatment course of Cefpodoxime 200 mg PO q 24 hr     > History of VRE urinary tract infection, 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)   > Urine culture (collected 10/8/2021, resulted 10/14/2021) yielded growth of >100,000 colonies/ml of Enterococcus faecalis RESISTANT to Ciprofloxacin, Levofloxacin, Tetracycline and Vancomycin   > Contact isolation    > Paroxysmal atrial fibrillation, anticoagulated on Apixaban   > Anticoagulation    > Continue Apixaban 5 mg PO BID    > Rate-control    > None   > Maintenance of sinus rhythm    > Continue Amiodarone 200 mg PO once daily    > Glaucoma    > Continue Latanoprost 0.005% ophthalmic solution, 1 drop into each ete q PM    > Hypothyroidism   > TSH (11/13/2021) = 11.4   > TSH (11/22/2021) = 1.69   > Free T4 (11/22/2021) = 1.1   > Continue Levothyroxine 125 mcg PO once daily     > Chronic hypotension   > On 11/23/2021, decreased Midodrine from 10 mg to 5 mg PO TID with meals   > Continue Midodrine 5 mg PO TID with meals     > End-stage renal disease, on hemodialysis (Mon-Wed-Fri); Secondary hyperparathyroidism of renal origin   > Nephrology consult (Dr. Ava Garcia) called for evaluation of renal status and to arrange for hemodialysis while patient is in the ARU   > Continue:    > Ascorbic acid 500 mg PO once daily     > Cyanocobalamin 1,000 mcg PO once daily    > Vitamin B complex 1 tab PO once daily     > Doxercalciferol 4 mcg IV q Mon-Wed-Fri    > Epoetin caitlin 8,000 units SC q Mon-Wed-Fri     > 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, diet-controlled   > HbA1c (10/8/2021) = 4.6   > No need for blood glucose monitoring     > Depression   > Continue Duloxetine 20 mg PO once daily     > ? Allergy to albumin   > Will list as allergy per pt and daughter-in-law request.  Start antihistamine.   Monitor.    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   > On 11/28/2021, started Lidocaine-Prilocaine 2.5-2.5% cream, applied to palmar surface of ring finger of left hand BID   > Continue:    > Duloxetine 20 mg PO once daily    > Gabapentin 200 mg PO q Mon-Wed-Fri    > Lidocaine-Prilocaine 2.5-2.5% cream, applied to palmar surface of ring finger of left hand BID    > Analgesia / Chronic low back pain   > Continue:    > Acetaminophen 650 mg PO TID (8AM, 12PM, 4PM)    > Acetaminophen 650 mg PO q 4 hr PRN for pain level 4/10 or lesser (from 8PM to 4AM only)    > Lidocaine 4% patch, 1 patch on affected area q 24 hr (12 hr on, 12 hr off)    > Hydromorphone 1 mg PO q 8 hr PRN for pain level 5/10 or greater     > Diet:   > Specifications: 3 carb choices (45 gm/meal); Low fat/Low cholesterol/High fiber/IRINEO; Low potassium (less than 3,000 mg/day); Low phosphorus (less than 1,000 mg/day)   > Solids (consistency): Regular   > Liquids (consistency):  Thin   > Fluid restriction: None       Signed:    Apolinar Jay MD    November 28, 2021

## 2021-11-28 NOTE — PROGRESS NOTES
Problem: Self Care Deficits Care Plan (Adult)  Goal: *Therapy Goal (Edit Goal, Insert Text)  Description: Long Term Goals (to be met upon discharge date) in order to increase pt's functional independence and safety, and decrease burden of care:  1. Pt will perform self-feeding with Mod Independent  2. Pt will perform grooming with Mod Independent  3. Pt will perform UB bathing with supervision  4. Pt will perform LB bathing with SBA  5. Pt will perform shower transfer with SBA  6. Pt will perform UB dressing with supervision  7. Pt will perform LB dressing with SBA  8. Pt will perform toileting task with SBA  9. Pt will perform toilet transfer with SBA  10. Pt will perform an IADL task while standing with SBA     Short Term Weekly Goals for (2021-2021) in order to increase pt's functional independence and safety, and decrease burden of care:  1. Pt will perform self-feeding with supervision  2. Pt will perform grooming with supervision  3. Pt will perform UB bathing with SBA  4. Pt will perform LB bathing with Min A  5. Pt will perform shower transfer with Min A  6. Pt will perform UB dressing with SBA  7. Pt will perform LB dressing with Min A  8. Pt will perform toileting task with Min A  9. Pt will perform toilet transfer with Min A  Outcome: Progressing Towards Goal   Occupational Therapy TREATMENT    Patient: Roya Chang   64 y.o. Patient identified with name and : yes    Date: 2021    First Tx Session  Time In: 36  Time Out[de-identified] 1200      Diagnosis: Multiple closed pelvic fractures without disruption of pelvic ring (HCC) [S32.82XA]   Precautions: Contact, Fall, Skin, PWB (50% Left LE)  Chart, occupational therapy assessment, plan of care, and goals were reviewed. Pain:  Pt reports 0/10 pain or discomfort prior to treatment. Pt reports 0/10 pain or discomfort post treatment.    Intervention Provided: n/a      SUBJECTIVE:   Patient stated I just gotta keep going, keep getting stronger.     OBJECTIVE DATA SUMMARY:     THERAPEUTIC EXERCISE Daily Assessment    Pt engaged in FM tabletop activity while standing at table x10 minutes. Pt I'ly following PWB precautions, VC to maintain upright posture, tendency for L lateral trunk flexion. Brief mobilization of R shoulder. MOBILITY/TRANSFERS Daily Assessment     Pt received resting head on sink while seated in w/c. I with w/c propulsion to and from therapy gym using BUE and BLE to propel. Pt reports following PWB precautions when propelling w/c. Completes sit to stand with CGA. ASSESSMENT:  Pt progressing with standing tolerance for hygiene tasks. VC for postural adjustments, recommend dynamic sitting activities with resistance to increase core strength. Progression toward goals:  [x]          Improving appropriately and progressing toward goals  []          Improving slowly and progressing toward goals  []          Not making progress toward goals and plan of care will be adjusted     PLAN:  Patient continues to benefit from skilled intervention to address the above impairments. Continue treatment per established plan of care. Discharge Recommendations: To Be Determined  Further Equipment Recommendations for Discharge:  N/A     Activity Tolerance:  Good      Estimated LOS: Per pt, 12/17    Please refer to the flowsheet for vital signs taken during this treatment. After treatment:   [x]  Patient left in no apparent distress sitting up in chair   []  Patient left in no apparent distress in bed  [x]  Call bell left within reach  []  Nursing notified  []  Caregiver present  [x]  Chair alarm activated    COMMUNICATION/EDUCATION:   [x] Home safety education was provided and the patient/caregiver indicated understanding. [x] Patient/family have participated as able in goal setting and plan of care. [x] Patient/family agree to work toward stated goals and plan of care.   [] Patient understands intent and goals of therapy, but is neutral about his/her participation. [] Patient is unable to participate in goal setting and plan of care.       Tashi Ramirez, OT

## 2021-11-28 NOTE — PROGRESS NOTES
Problem: Risk for Spread of Infection  Goal: Prevent transmission of infectious organism to others  Description: Prevent the transmission of infectious organisms to other patients, staff members, and visitors. Outcome: Progressing Towards Goal     Problem: Patient Education:  Go to Education Activity  Goal: Patient/Family Education  Outcome: Progressing Towards Goal     Problem: Falls - Risk of  Goal: *Absence of Falls  Description: Document Sunshine Borges Fall Risk and appropriate interventions in the flowsheet. Outcome: Progressing Towards Goal  Note: Fall Risk Interventions:  Mobility Interventions: Bed/chair exit alarm, Utilize walker, cane, or other assistive device    Mentation Interventions: Adequate sleep, hydration, pain control, Bed/chair exit alarm    Medication Interventions: Assess postural VS orthostatic hypotension, Bed/chair exit alarm, Teach patient to arise slowly    Elimination Interventions: Bed/chair exit alarm, Call light in reach, Patient to call for help with toileting needs    History of Falls Interventions: Bed/chair exit alarm, Door open when patient unattended, Room close to nurse's station, Investigate reason for fall, Utilize gait belt for transfer/ambulation         Problem: Patient Education: Go to Patient Education Activity  Goal: Patient/Family Education  Outcome: Progressing Towards Goal     Problem: Pressure Injury - Risk of  Goal: *Prevention of pressure injury  Description: Document Giovany Scale and appropriate interventions in the flowsheet.   Outcome: Progressing Towards Goal  Note: Pressure Injury Interventions:  Sensory Interventions: Assess changes in LOC, Avoid rigorous massage over bony prominences, Chair cushion, Keep linens dry and wrinkle-free, Pressure redistribution bed/mattress (bed type)    Moisture Interventions: Absorbent underpads, Apply protective barrier, creams and emollients, Maintain skin hydration (lotion/cream)    Activity Interventions: Chair cushion, Increase time out of bed, Pressure redistribution bed/mattress(bed type)    Mobility Interventions: Chair cushion, HOB 30 degrees or less, Pressure redistribution bed/mattress (bed type)    Nutrition Interventions: Document food/fluid/supplement intake                     Problem: Patient Education: Go to Patient Education Activity  Goal: Patient/Family Education  Outcome: Progressing Towards Goal     Problem: Patient Education: Go to Patient Education Activity  Goal: Patient/Family Education  Outcome: Progressing Towards Goal     Problem: Patient Education: Go to Patient Education Activity  Goal: Patient/Family Education  Outcome: Progressing Towards Goal     Problem: Pain  Goal: *Control of Pain  Outcome: Progressing Towards Goal     Problem: Patient Education: Go to Patient Education Activity  Goal: Patient/Family Education  Outcome: Progressing Towards Goal

## 2021-11-28 NOTE — PROGRESS NOTES
RENAL DAILY PROGRESS NOTE            63y F with PMH ESRD admitted in rehab after pelvic fracture, following for ESRD management   Subjective:       Complaint:   Overnight events noted  Doing well in rehab     IMPRESSION:   ESRD, MWF  Access; left arm fistula   Anemia , on epogen   H/o chronic UTI h/o right ureteral stent  Close pelvic fracture   Atrial fibrilation    PLAN:   HD tomorrow with 2K bath UF goal 2L as tolerated.                  Current Facility-Administered Medications   Medication Dose Route Frequency    calcium acetate(phosphat bind) (PHOSLO) capsule 667 mg  1 Capsule Oral TID WITH MEALS    lidocaine-prilocaine (EMLA) 2.5-2.5 % cream   Topical BID    midodrine (PROAMATINE) tablet 5 mg  5 mg Oral TID WITH MEALS    allopurinoL (ZYLOPRIM) tablet 100 mg  100 mg Oral Q MON, WED & FRI    epoetin caitlin-epbx (RETACRIT) injection 8,000 Units  8,000 Units SubCUTAneous Q MON, WED & FRI    cetirizine (ZYRTEC) tablet 10 mg  10 mg Oral DAILY    acetaminophen (TYLENOL) tablet 650 mg  650 mg Oral Q4H PRN    bisacodyL (DULCOLAX) tablet 10 mg  10 mg Oral Q48H PRN    amiodarone (CORDARONE) tablet 200 mg  200 mg Oral DAILY    acetaminophen (TYLENOL) tablet 650 mg  650 mg Oral TID    apixaban (ELIQUIS) tablet 5 mg  5 mg Oral BID    HYDROmorphone (DILAUDID) tablet 1 mg  1 mg Oral Q8H PRN    meclizine (ANTIVERT) tablet 12.5 mg  12.5 mg Oral TID PRN    DULoxetine (CYMBALTA) capsule 20 mg  20 mg Oral DAILY    vitamin B complex-folic acid 0.4 mg tablet  1 Tablet Oral DAILY    cyanocobalamin tablet 1,000 mcg  1,000 mcg Oral DAILY    L. acidophilus,casei,rhamnosus (BIO-K PLUS) capsule 1 Capsule  1 Capsule Oral DAILY    ascorbic acid (vitamin C) (VITAMIN C) tablet 500 mg  500 mg Oral DAILY    cholecalciferol (VITAMIN D3) (1000 Units /25 mcg) tablet 2,000 Units  2,000 Units Oral BID    latanoprost (XALATAN) 0.005 % ophthalmic solution 1 Drop  1 Drop Both Eyes QPM    levothyroxine (SYNTHROID) tablet 125 mcg  125 mcg Oral 6am    pantoprazole (PROTONIX) tablet 20 mg  20 mg Oral ACB    ondansetron (ZOFRAN ODT) tablet 4 mg  4 mg Oral TID PRN    lidocaine 4 % patch 1 Patch  1 Patch TransDERmal Q24H    gabapentin (NEURONTIN) capsule 200 mg  200 mg Oral Q MON, WED & FRI    doxercalciferoL (HECTOROL) 4 mcg/2 mL injection 4 mcg  4 mcg IntraVENous DIALYSIS MON, WED & FRI       Review of Symptoms: comprehensive ROS negative except above.    Objective:     Patient Vitals for the past 24 hrs:   Temp Pulse Resp BP SpO2   11/28/21 0758 97 °F (36.1 °C) 84 18 128/65 99 %   11/27/21 2017 97 °F (36.1 °C) 76 18 137/70 97 %   11/27/21 1558 97.2 °F (36.2 °C) 73 16 (!) 102/55 100 %        Weight change:      11/26 1901 - 11/28 0700  In: 600 [P.O.:600]  Out: -     Intake/Output Summary (Last 24 hours) at 11/28/2021 1225  Last data filed at 11/28/2021 6350  Gross per 24 hour   Intake 600 ml   Output --   Net 600 ml     Physical Exam:   General: comfortable, no acute distress   HEENT sclera anicteric, supple neck, no thyromegaly  CVS: S1S2 heard,  no rub  RS: + air entry b/l,   Abd: Soft, Non tender,  Neuro: non focal, awake, alert , CN II-XII are grossly intact  Extrm: edema, no cyanosis, clubbing   Skin: no visible  Rash  Musculoskeletal: No gross joints or bone deformities         Data Review:     LABS:   Hematology:   Recent Labs     11/26/21  0635   WBC 8.0   HGB 9.7*   HCT 32.4*     Chemistry:   Recent Labs     11/26/21  0635   BUN 48*   CREA 6.38*   CA 9.4   ALB 3.6   K 5.1         CO2 28   PHOS 7.2*   *            Procedures/imaging: see electronic medical records for all procedures, Xrays and details which were not copied into this note but were reviewed prior to creation of Plan          Assessment & Plan:     As above         Yoli Martinez MD  11/28/2021  2:33 PM

## 2021-11-29 PROBLEM — E87.5 HYPERKALEMIA: Status: ACTIVE | Noted: 2021-01-01

## 2021-11-29 LAB
ALBUMIN SERPL-MCNC: 3.7 G/DL (ref 3.4–5)
ANION GAP SERPL CALC-SCNC: 14 MMOL/L (ref 3–18)
BASOPHILS # BLD: 0.1 K/UL (ref 0–0.1)
BASOPHILS NFR BLD: 1 % (ref 0–2)
BUN SERPL-MCNC: 74 MG/DL (ref 7–18)
BUN/CREAT SERPL: 10 (ref 12–20)
CALCIUM SERPL-MCNC: 9.9 MG/DL (ref 8.5–10.1)
CHLORIDE SERPL-SCNC: 101 MMOL/L (ref 100–111)
CO2 SERPL-SCNC: 20 MMOL/L (ref 21–32)
CREAT SERPL-MCNC: 7.68 MG/DL (ref 0.6–1.3)
DIFFERENTIAL METHOD BLD: ABNORMAL
EOSINOPHIL # BLD: 0.2 K/UL (ref 0–0.4)
EOSINOPHIL NFR BLD: 2 % (ref 0–5)
ERYTHROCYTE [DISTWIDTH] IN BLOOD BY AUTOMATED COUNT: 16.7 % (ref 11.6–14.5)
GLUCOSE SERPL-MCNC: 138 MG/DL (ref 74–99)
HCT VFR BLD AUTO: 29.7 % (ref 35–45)
HGB BLD-MCNC: 9 G/DL (ref 12–16)
IMM GRANULOCYTES # BLD AUTO: 0.1 K/UL (ref 0–0.04)
IMM GRANULOCYTES NFR BLD AUTO: 1 % (ref 0–0.5)
LYMPHOCYTES # BLD: 3 K/UL (ref 0.9–3.6)
LYMPHOCYTES NFR BLD: 36 % (ref 21–52)
MCH RBC QN AUTO: 31.3 PG (ref 24–34)
MCHC RBC AUTO-ENTMCNC: 30.3 G/DL (ref 31–37)
MCV RBC AUTO: 103.1 FL (ref 78–100)
MONOCYTES # BLD: 0.9 K/UL (ref 0.05–1.2)
MONOCYTES NFR BLD: 11 % (ref 3–10)
NEUTS SEG # BLD: 4 K/UL (ref 1.8–8)
NEUTS SEG NFR BLD: 49 % (ref 40–73)
NRBC # BLD: 0 K/UL (ref 0–0.01)
NRBC BLD-RTO: 0 PER 100 WBC
PHOSPHATE SERPL-MCNC: 8.4 MG/DL (ref 2.5–4.9)
PLATELET # BLD AUTO: 322 K/UL (ref 135–420)
PMV BLD AUTO: 9.8 FL (ref 9.2–11.8)
POTASSIUM SERPL-SCNC: 6.2 MMOL/L (ref 3.5–5.5)
RBC # BLD AUTO: 2.88 M/UL (ref 4.2–5.3)
SODIUM SERPL-SCNC: 135 MMOL/L (ref 136–145)
WBC # BLD AUTO: 8.1 K/UL (ref 4.6–13.2)

## 2021-11-29 PROCEDURE — 97116 GAIT TRAINING THERAPY: CPT

## 2021-11-29 PROCEDURE — 80069 RENAL FUNCTION PANEL: CPT

## 2021-11-29 PROCEDURE — 97535 SELF CARE MNGMENT TRAINING: CPT

## 2021-11-29 PROCEDURE — 74011250637 HC RX REV CODE- 250/637: Performed by: INTERNAL MEDICINE

## 2021-11-29 PROCEDURE — 97530 THERAPEUTIC ACTIVITIES: CPT

## 2021-11-29 PROCEDURE — 2709999900 HC NON-CHARGEABLE SUPPLY

## 2021-11-29 PROCEDURE — 74011250636 HC RX REV CODE- 250/636: Performed by: INTERNAL MEDICINE

## 2021-11-29 PROCEDURE — 97110 THERAPEUTIC EXERCISES: CPT

## 2021-11-29 PROCEDURE — 65310000000 HC RM PRIVATE REHAB

## 2021-11-29 PROCEDURE — 74011000250 HC RX REV CODE- 250: Performed by: INTERNAL MEDICINE

## 2021-11-29 PROCEDURE — 85025 COMPLETE CBC W/AUTO DIFF WBC: CPT

## 2021-11-29 PROCEDURE — 90935 HEMODIALYSIS ONE EVALUATION: CPT

## 2021-11-29 PROCEDURE — 36415 COLL VENOUS BLD VENIPUNCTURE: CPT

## 2021-11-29 PROCEDURE — 99232 SBSQ HOSP IP/OBS MODERATE 35: CPT | Performed by: INTERNAL MEDICINE

## 2021-11-29 PROCEDURE — 74011250637 HC RX REV CODE- 250/637: Performed by: FAMILY MEDICINE

## 2021-11-29 RX ORDER — CALCIUM ACETATE 667 MG/1
2 CAPSULE ORAL
Status: DISCONTINUED | OUTPATIENT
Start: 2021-11-29 | End: 2021-11-29

## 2021-11-29 RX ORDER — SEVELAMER CARBONATE 800 MG/1
1600 TABLET, FILM COATED ORAL
Status: DISCONTINUED | OUTPATIENT
Start: 2021-11-29 | End: 2021-12-04 | Stop reason: HOSPADM

## 2021-11-29 RX ADMIN — ALLOPURINOL 100 MG: 100 TABLET ORAL at 20:55

## 2021-11-29 RX ADMIN — MIDODRINE HYDROCHLORIDE 5 MG: 5 TABLET ORAL at 18:12

## 2021-11-29 RX ADMIN — ACETAMINOPHEN 650 MG: 325 TABLET ORAL at 18:11

## 2021-11-29 RX ADMIN — GABAPENTIN 200 MG: 100 CAPSULE ORAL at 18:12

## 2021-11-29 RX ADMIN — Medication 1 TABLET: at 09:14

## 2021-11-29 RX ADMIN — APIXABAN 5 MG: 5 TABLET, FILM COATED ORAL at 09:14

## 2021-11-29 RX ADMIN — MIDODRINE HYDROCHLORIDE 5 MG: 5 TABLET ORAL at 12:00

## 2021-11-29 RX ADMIN — ACETAMINOPHEN 650 MG: 325 TABLET ORAL at 12:03

## 2021-11-29 RX ADMIN — LEVOTHYROXINE SODIUM 125 MCG: 125 TABLET ORAL at 05:59

## 2021-11-29 RX ADMIN — CHOLECALCIFEROL TAB 25 MCG (1000 UNIT) 2000 UNITS: 25 TAB at 09:13

## 2021-11-29 RX ADMIN — CHOLECALCIFEROL TAB 25 MCG (1000 UNIT) 2000 UNITS: 25 TAB at 18:13

## 2021-11-29 RX ADMIN — HYDROMORPHONE HYDROCHLORIDE 1 MG: 2 TABLET ORAL at 08:39

## 2021-11-29 RX ADMIN — CYANOCOBALAMIN TAB 1000 MCG 1000 MCG: 1000 TAB at 09:14

## 2021-11-29 RX ADMIN — CALCIUM ACETATE 667 MG: 667 CAPSULE ORAL at 09:14

## 2021-11-29 RX ADMIN — HYDROMORPHONE HYDROCHLORIDE 1 MG: 2 TABLET ORAL at 18:12

## 2021-11-29 RX ADMIN — SEVELAMER CARBONATE 1600 MG: 800 TABLET, FILM COATED ORAL at 18:18

## 2021-11-29 RX ADMIN — CALCIUM ACETATE 667 MG: 667 CAPSULE ORAL at 12:03

## 2021-11-29 RX ADMIN — Medication 1 CAPSULE: at 09:14

## 2021-11-29 RX ADMIN — APIXABAN 5 MG: 5 TABLET, FILM COATED ORAL at 18:12

## 2021-11-29 RX ADMIN — SODIUM ZIRCONIUM CYCLOSILICATE 10 G: 10 POWDER, FOR SUSPENSION ORAL at 13:26

## 2021-11-29 RX ADMIN — MIDODRINE HYDROCHLORIDE 5 MG: 5 TABLET ORAL at 09:16

## 2021-11-29 RX ADMIN — LIDOCAINE AND PRILOCAINE: 25; 25 CREAM TOPICAL at 09:22

## 2021-11-29 RX ADMIN — LIDOCAINE AND PRILOCAINE: 25; 25 CREAM TOPICAL at 12:04

## 2021-11-29 RX ADMIN — AMIODARONE HYDROCHLORIDE 200 MG: 200 TABLET ORAL at 09:14

## 2021-11-29 RX ADMIN — ACETAMINOPHEN 650 MG: 325 TABLET ORAL at 09:13

## 2021-11-29 RX ADMIN — DOXERCALCIFEROL 4 MCG: 4 INJECTION, SOLUTION INTRAVENOUS at 16:55

## 2021-11-29 RX ADMIN — LATANOPROST 1 DROP: 50 SOLUTION OPHTHALMIC at 21:00

## 2021-11-29 RX ADMIN — PANTOPRAZOLE SODIUM 20 MG: 20 TABLET, DELAYED RELEASE ORAL at 06:23

## 2021-11-29 RX ADMIN — CETIRIZINE HYDROCHLORIDE 10 MG: 10 TABLET, FILM COATED ORAL at 09:14

## 2021-11-29 RX ADMIN — Medication 500 MG: at 09:13

## 2021-11-29 RX ADMIN — DULOXETINE HYDROCHLORIDE 20 MG: 20 CAPSULE, DELAYED RELEASE ORAL at 09:14

## 2021-11-29 RX ADMIN — EPOETIN ALFA-EPBX 8000 UNITS: 4000 INJECTION, SOLUTION INTRAVENOUS; SUBCUTANEOUS at 20:56

## 2021-11-29 NOTE — DIALYSIS
DAT        ACUTE HEMODIALYSIS FLOW SHEET      HEMODIALYSIS ORDERS: Physician: Sarah     Dialyzer: revaclear   Duration: 3 hr  BFR: 350   DFR: 600   Dialysate:  Temp 36-37*C  K+   2    Ca+  2 Na 138 Bicarb 30   Weight:   kg    Patient Chart []     Unable to Obtain [x]   Dry weight/UF Goal: 2000 as tolerated   Access: LUE AVF  Needle Gauge: 15    Heparin []  Bolus      Units    [] Hourly       Units    [x]None       Pre BP:   161/53    Pulse:     57       Respirations: 18  Temperature:   98.0   Labs: Pre        Post:         [x] N/A   Additional Orders(medications, blood products, hypotension management):  hectorol     [x] Dat Consent Verified     CATHETER ACCESS: [x]N/A      GRAFT/FISTULA ACCESS:  []N/A     []Right     [x]Left     [x]UE     []LE   []AVG   [x]AVF        []Buttonhole    [x]Medical Aseptic Prep Utilized   [x]No S/S infection  []Redness  []Drainage []Cultured []Swelling []Pain    Bruit:   [x] Strong    [] Weak       Thrill :   [x] Strong    [] Weak       Needle Gauge: 15   Length: 1   If access problem,  notified: []Yes     [x]N/A      Please describe access if present and not used:                            GENERAL ASSESSMENT:      LUNGS:  Rate 18 SaO2%        [] N/A    [x] Clear  [] Coarse  [] Crackles  [] Wheezing        [] Diminished     Location : []RLL   []LLL    []RUL  []LALI     Cough: []Productive  []Dry  [x]N/A   Respirations:  [x]Easy  []Labored     Therapy:   [x]RA  []NC  l/min    Mask: []NRB []Venti       O2%                  []Ventilator  []Intubated  [] Trach  [] BiPaP     CARDIAC: [x]Regular      [] Irregular   [] Pericardial Rub  [] JVD        []  Monitored  [] Bedside  [] Remotely monitored [x] N/A        EDEMA: [] None   [x]Generalized  [] Pitting [] 1    [] 2    [] 3    [] 4                 [] Facial  [] Pedal  []  UE  [] LE     SKIN:   [x] Warm   [] Hot     [] Cold   [x] Dry     [] Pale   [] Diaphoretic                  [] Flushed  [] Jaundiced  [] Cyanotic  [] Rash  [] Weeping     LOC:    [x] Alert      [x]Oriented:    [x] Person     [x] Place  [x]Time               [] Confused  [] Lethargic  [] Medicated  [] Non-responsive     GI / ABDOMEN:  [] Flat    [] Distended    [x] Soft    [] Firm   []  Obese                             [] Diarrhea  [x] Bowel Sounds  [] Nausea  [] Vomiting       / URINE ASSESSMENT:[] Voiding   [x] Oliguria  [] Anuria   []  Cline     [] Incontinent    []  Incontinent Brief      []  Bathroom Privileges       PAIN: [x] 0 []1  []2   []3   []4   []5   []6   []7   []8   []9   []10              Scale 0-10  Action/Follow Up:      MOBILITY:  [] Amb    [] Amb/Assist    [x] Bed    [] Wheelchair  [] Stretcher      All Vitals and Treatment Details on Attached [address]: [address] [facility]          Room # 178/01      [] 1st Time Acute  [] Stat  [x] Routine  [] Urgent     [x] Acute Room  []  Bedside  [] ICU/CCU  [] ER   Isolation Precautions:  Contact      Special Considerations:         [] Blood Consent Verified [x]N/A      ALLERGIES:   Allergies   Allergen Reactions    Albumin, Human 25 % Itching     Headache - severe migraine like, itchy eyes, runny nose    Ciprofloxacin Hives    Cyclopentolate Unknown (comments)    Iron Sucrose Diarrhea    Statins-Hmg-Coa Reductase Inhibitors Other (comments)     Body ache               Code Status:DNR        Hepatitis Status:                        Lab Results   Component Value Date/Time    Hepatitis B surface Ag <0.10 11/17/2021 02:20 PM    Hepatitis B surface Ab 29.33 11/17/2021 02:20 PM                     Current Labs:   Lab Results   Component Value Date/Time    Sodium 135 (L) 11/29/2021 10:08 AM    Potassium 6.2 (HH) 11/29/2021 10:08 AM    Chloride 101 11/29/2021 10:08 AM    CO2 20 (L) 11/29/2021 10:08 AM    Anion gap 14 11/29/2021 10:08 AM    Glucose 138 (H) 11/29/2021 10:08 AM    BUN 74 (H) 11/29/2021 10:08 AM    Creatinine 7.68 (H) 11/29/2021 10:08 AM    BUN/Creatinine ratio 10 (L) 11/29/2021 10:08 AM    GFR est AA 6 (L) 11/29/2021 10:08 AM    GFR est non-AA 5 (L) 11/29/2021 10:08 AM    Calcium 9.9 11/29/2021 10:08 AM      Lab Results   Component Value Date/Time    WBC 8.1 11/29/2021 05:39 AM    Hemoglobin, POC 8.8 (L) 09/24/2020 08:45 AM    HGB 9.0 (L) 11/29/2021 05:39 AM    Hematocrit, POC 26 (L) 09/24/2020 08:45 AM    HCT 29.7 (L) 11/29/2021 05:39 AM    PLATELET 561 99/05/2455 05:39 AM    .1 (H) 11/29/2021 05:39 AM                                                                                     DIET:   DIET ADULT ORAL NUTRITION SUPPLEMENT  DIET ADULT       PRIMARY NURSE REPORT: First initial/Last name/Title      Pre Dialysis: MEGHANN Quiroz RN     Time: 1329      EDUCATION:    [x] Patient [] Other         Knowledge Basis: []None [x]Minimal [] Substantial   Barriers to learning  [x]N/A   [] Access Care     [] S&S of infection     [] Fluid Management     []K+     [x]Procedural    []Albumin     [] Medications     [] Tx Options     [] Transplant     [] Diet     [] Other   Teaching Tools:  [x] Explain  [x] Demo  [] Handouts [] Video  Patient response:  [x] Verbalized understanding  [] Teach back  [] Return demonstration [] Requires follow up   Inappropriate due to:            [x]Time Out/Safety Check  [x]Extracorporeal Circuit Tested for integrity       [address] - Before each treatment:     Machine Number:                   [facility]                                  [x] Unit Machine # 9 with centralized RO                                      Alarm Test:  Pass time 7918               [x] RO/Machine Log Complete      Temp   36             Dialysate: pH  7.4 Conductivity: Meter   13.3     HD Machine   13.2                  TCD: 13.3  Dialyzer Lot # [id number]            Blood Tubing Lot # [id number]          Saline Lot #  [id number]     CHLORINE TESTING-Before each treatment and every 4 hours    Total Chlorine: [x] less than 0.1 ppm  Time: 1300 4 Hr/2nd Check Time: 1700   (if greater than 0.1 ppm from Primary then every 30 minutes from Secondary)     TREATMENT INITIATION - with Dialysis Precautions:   [x] All Connections Secured                 [x] Saline Line Double Clamped   [x] Venous Parameters Set                  [x] Arterial Parameters Set    [x] Prime Given 250ml                          [x]Air Foam Detector Engaged      Treatment Initiation Note:   Pt arrived to HD unit via bed. A&Ox4 in NAD on RA.  LUE AVF assessed with no s/s complications. HD initiated without difficulty with 15g x2. During Treatment Notes:  9765 Pt awake and alert. Face and access in view with connections secure. 1430 Pt awake and alert. Face and access in view with connections secure. 1445 Pt awake and alert. Face and access in view with connections secure. 1500 Pt awake and alert. Face and access in view with connections secure. 1515 Pt awake and alert. Face and access in view with connections secure. 1530 Pt awake and alert. Face and access in view with connections secure. 1545 Pt awake and alert. Face and access in view with connections secure. 1600 Pt awake and alert. Face and access in view with connections secure. 1615 Pt awake and alert. Face and access in view with connections secure. 1630 Pt awake and alert. Face and access in view with connections secure. 1645 Pt awake and alert. Face and access in view with connections secure. 1700 Pt awake and alert. Face and access in view with connections secure. [id number] Pt awake and alert. Face and access in view with connections secure.            Medication Dose Volume Route Time [facility] name Title   hectorol 4 mcg 2 ml dialysis [address]                   Post Assessment:   Dialyzer Cleared: [] Good [x] Fair  [] Poor  Blood processed:  62.3 L  UF Removed  1500 mL  POst BP:   100/48       Pulse: 87        Respirations: 18  Temperature: 98.1 Lungs:     [x] Clear      [] Course         [] Crackles    [] Wheezing         [] Diminished Post Tx Vascular Access:   AVF/AVG: Bleeding stopped   Art 10 min. Jasper. 10 Min    Cardiac:   [x] Regular   [] Irregular   [] Monitor  [x] N/A       Catheter:    N/A    Skin:   Pain:   [x] Warm  [x] Dry [] Diaphoretic    [] Flushed    [] Pale [] Cyanotic [x]0  []1  []2   []3  []4   []5   []6   []7   []8   []9   []10     Post Treatment Note:  Pt tolerated treatment well. Net 1 L UF removed     POST TREATMENT PRIMARY NURSE HANDOFF REPORT:     First initial/Last name/Title         Post Dialysis: MEGHANN Rubalcava RN      Time:  4811     Abbreviations: AVG-arterial venous graft, AVF-arterial venous fistula, IJ-Internal Jugular, Subcl-Subclavian, Fem-Femoral, Tx-treatment, AP/HR-apical heart rate, DFR-dialysate flow rate, BFR-blood flow rate, AP-arterial pressure, -venous pressure, UF-ultrafiltrate, TMP-transmembrane pressure, Jasper-Venous, Art-Arterial, RO-Reverse Osmosis

## 2021-11-29 NOTE — PROGRESS NOTES
Problem: Self Care Deficits Care Plan (Adult)  Goal: *Therapy Goal (Edit Goal, Insert Text)  Description: Long Term Goals (to be met upon discharge date) in order to increase pt's functional independence and safety, and decrease burden of care:  1. Pt will perform self-feeding with Mod Independent  2. Pt will perform grooming with Mod Independent  3. Pt will perform UB bathing with supervision  4. Pt will perform LB bathing with SBA  5. Pt will perform shower transfer with SBA  6. Pt will perform UB dressing with supervision  7. Pt will perform LB dressing with SBA  8. Pt will perform toileting task with SBA  9. Pt will perform toilet transfer with SBA  10. Pt will perform an IADL task while standing with SBA     Short Term Weekly Goals for (11/20/2021-11/27/2021) in order to increase pt's functional independence and safety, and decrease burden of care:updated 11/29/21  1. Pt will perform self-feeding with supervision   11/29/21  2. Pt will perform grooming with supervision  11/29/21   3. Pt will perform UB bathing with SBA    11/29/21  4. Pt will perform LB bathing with Min A   11/29/21  5. Pt will perform shower transfer with Min A    11/29/21  6. Pt will perform UB dressing with SBA     11/29/21  7. Pt will perform LB dressing with Min A    11/29/21  8. Pt will perform toileting task with Min A   11/29/21  9. Pt will perform toilet transfer with Min A  11/29/21  Outcome: Progressing Towards Goal  OT WEEKLY PROGRESS NOTE  Patient Name:Randee Jarred   Time Spent With Patient  Time In: 0730  Time Out: 0900  Patient Seen For[de-identified] AM (4/10 pain at left groin)    Medical Diagnosis:  Multiple closed pelvic fractures without disruption of pelvic ring ([address] [S32.82XA] Multiple closed pelvic fractures without disruption of pelvic ring (HCC)     Pain at start of tx 3/10 pain or discomfort. Pain at stop of tx: 8/10 pain or discomfort left groin. Nurse made aware and pain medication given.      Patient identified with name and :Yes  Subjective: \"The pain is in my left groin.          Objective: Self Care and Functional Transfers      Outcome Measures:      AROM: Limited 2/2 to previous injury      COGNITION/PERCEPTION Initial Assessment Weekly Progress Assessment 2021   Premorbid Reading Status Literate     Premorbid Writing Status WNL     Arousal/Alertness       Orientation Level Oriented X4 Oriented X4   Visual Fields       Praxis Intact     Body Scheme Appears intact     COMPREHENSION MODE Initial Assessment Weekly Progress Assessment 2021   Primary Mode of Comprehension Auditory Auditory   Hearing Aide None     Corrective Lenses Glasses     Score 5 5     EXPRESSION Initial Assessment Weekly Progress Assessment 2021   Primary Mode of Expression Verbal Verbal   Score 5 5   Comments expresses her needs appropriately expresses her needs appropriately     SOCIAL INTERACTION/ PRAGMATICS Initial Assessment Weekly Progress Assessment 2021   Score 4 4   Comments polite & nice polite & nice     PROBLEM SOLVING Initial Assessment Weekly Progress Assessment 2021   Score 4 4   Comments she knew to don her shoes so not to slide on the floor with her socks on before we used RW to go from her bed to chair she knew to don her shoes so not to slide on the floor with her socks on before we used RW to go from her bed to chair     MEMORY Initial Assessment Weekly Progress Assessment 2021   Score 4 4   Comments appears WNL with sharing her medical hx & family dynamics appears WNL with sharing her medical hx & family dynamics     EATING Initial Assessment Weekly Progress Assessment 2021   Functional Level 5     Comments setup on bedside table       GROOMING Initial Assessment Weekly Progress Assessment 2021   Functional Level 5 Grooming  Grooming Assistance : 5 (Supervision) (pt washed face and shampooed hair in shower)   Tasks completed by patient Brushed hair, Brushed teeth, Washed face, Washed hands     Comments set up on bedside table       BATHING Initial Assessment Weekly Progress Assessment 11/29/2021   Functional Level 3    Upper Body Bathing  Bathing Assistance, Upper: 5 (Supervision)  Position Performed: Seated in chair  Lower Body Bathing  Bathing Assistance, Lower : 5 (Supervision)  Position Performed: Seated in chair; Standing   Body parts patient bathed Abdomen, Arm, left, Arm, right, Chest, Julia area, Thigh, left, Thigh, right     Comments seated on EOB with basin of water,soap & washcloth       TUB/SHOWER TRANSFER INDEPENDENCE Initial Assessment Weekly Progress Assessment 11/29/2021   Score 4 Functional Transfers  Tub or Shower Type: Shower (using tub transfer bench)  Amount of Assistance Required: 5 (Supervision/setup) (vcs for tech)   Comments uses rw & TTWB       UPPER BODY DRESSING/UNDRESSING Initial Assessment Weekly Progress Assessment 11/29/2021   Functional Level 4 Upper Body Dressing   Dressing Assistance : 5 (Supervision) (setup)   Items applied/Steps completed Pullover (4 steps)     Comments set up while seated on EOB       LOWER BODY DRESSING/UNDRESSING Initial Assessment Weekly Progress Assessment 11/29/2021   Functional Level 3 Lower Body Dressing   Dressing Assistance : 5 (Supervision) (setup)  Leg Crossed Method Used: Yes  Position Performed: Seated in chair; Standing  Adaptive Equipment Used: Walker   Items applied/Steps completed Shoe, left (1 step), Shoe, right (1 step), Sock, left (1 step), Sock, right (1 step), Elastic waist pants (3 steps)     Comments min vc to direction for safety to stand & pull pants over hips       TOILETING Initial Assessment Weekly Progress Assessment 11/29/2021   Functional Level 3     Comments clothing management wearing scrubs due to not having her clothes here yet       TOILET TRANSFER INDEPENDENCE Initial Assessment Weekly Progress Assessment 11/29/2021   Transfer score 4 Functional Transfers  Tub or Shower Type: Shower (using tub transfer bench)  Amount of Assistance Required: 5 (Supervision/setup) (vcs for tech)   Comments rw       THEREX: Pt propelled w/c from room <>gym with S.  Sci Fit up to 10 minutes with rest breaks x3 2/2 to reporting increasing pain at left groin. ASSESSMENT:    Pt seen for Skilled Occupational Therapy since 11/20/21 to address functional deficits in ADLs/IADLs. Pt participated in therapeutic exercise, therapeutic activity, neuromuscular re-education, transfer training, education/instruction in using assistive devices/ compensatory strategies, and bathing/ dressing retraining. Pt currently shows S/Setup/SBA in most ADLs/IADLs meeting 9/9 STG's. Progression toward goals:  [x]          Improving appropriately and progressing toward goals  []          Improving slowly and progressing toward goals  []          Not making progress toward goals and plan of care will be adjusted     PLAN:  Patient continues to benefit from skilled intervention to address the above impairments. Continue treatment per established plan of care. Discharge Recommendations:  Home Health with 24hr asst  Further Equipment Recommendations for Discharge:  Bedside commode     Please refer to the flow sheet for vital signs taken during this treatment. After treatment:   [x]  Patient left in no apparent distress sitting up in chair  []  Patient left in no apparent distress in bed  []  Call bell left within reach  []  Nursing notified  []  Caregiver present  []  Bed alarm activated    COMMUNICATION/EDUCATION:   [] Home safety education was provided and the patient/caregiver indicated understanding. [x] Patient/family have participated as able in goal setting and plan of care. [] Patient/family agree to work toward stated goals and plan of care. [] Patient understands intent and goals of therapy, but is neutral about his/her participation. [] Patient is unable to participate in goal setting and plan of care.        Plan of Care: Please see Care Plan for updated STG/LTGs.    Family Training:    Estimated LOS:     Juan M Hinojosa OT  11/29/2021

## 2021-11-29 NOTE — PROGRESS NOTES
SHIFT CHANGE NOTE FOR [facility]    Bedside and Verbal shift change report given to JOSE ANTONIO Mcdaniel (oncoming nurse) by Neftali Chen RN (offgoing nurse). Report included the following information SBAR, Kardex, MAR and Recent Results.     Situation:   Code Status: DNR   Hospital Day: 10   Problem List:   Hospital Problems  Date Reviewed: 11/29/2021          Codes Class Noted POA    Hyperkalemia ICD-10-CM: E87.5  ICD-9-CM: 276.7  11/29/2021 No        Mononeuropathy (Chronic) ICD-10-CM: G58.9  ICD-9-CM: 355.9  Unknown Yes    Overview Signed 11/28/2021 11:29 AM by Ten Lentz MD     Involving ring finger of left hand             * (Principal) Multiple closed pelvic fractures without disruption of pelvic ring ([facility] [address] ICD-10-CM: D32.01YM  ICD-9-CM: 808.44  11/19/2021 Yes        Hyperphosphatemia ICD-10-CM: E83.39  ICD-9-CM: 275.3  11/14/2021 Yes        Anticoagulated by anticoagulation treatment ICD-10-CM: Z79.01  ICD-9-CM: V58.61  Unknown Yes    Overview Signed 11/23/2021  9:49 AM by Ten Lentz MD     On Apixaban             Paroxysmal atrial fibrillation (HCC) (Chronic) ICD-10-CM: I48.0  ICD-9-CM: 427.31  Unknown Yes        Closed fracture of left inferior pubic ramus, with routine healing, subsequent encounter ICD-10-CM: S32.592D  ICD-9-CM: V54.13  11/12/2021 Yes        Closed nondisplaced fracture of anterior wall of left acetabulum with routine healing ICD-10-CM: S32.415D  ICD-9-CM: V54.19  11/12/2021 Yes        Closed fracture of superior pubic ramus, left, with routine healing, subsequent encounter ICD-10-CM: S32.512D  ICD-9-CM: V54.19  11/12/2021 Yes        History of infection with vancomycin resistant Enterococcus (VRE) ICD-10-CM: Z35.33  ICD-9-CM: V12.09  10/8/2021 Yes    Overview Signed 11/23/2021  9:51 AM by Ten Lentz MD     Urine culture (collected 10/8/2021, resulted 10/14/2021) yielded growth of >100,000 colonies/ml of Enterococcus faecalis RESISTANT to Ciprofloxacin, Levofloxacin, Tetracycline and Vancomycin             History of urinary tract infection ICD-10-CM: Z87.440  ICD-9-CM: V13.02  10/8/2021 Yes    Overview Signed 11/23/2021  9:52 AM by Cherylene Falter, MD     Urine culture (collected 10/8/2021, resulted 10/14/2021) yielded growth of >100,000 colonies/ml of Enterococcus faecalis RESISTANT to Ciprofloxacin, Levofloxacin, Tetracycline and Vancomycin             Need for prophylactic isolation ICD-10-CM: Z41.8  ICD-9-CM: V07.0  10/8/2021 Yes    Overview Signed 11/23/2021  9:52 AM by Cherylene Falter, MD     Urine culture (collected 10/8/2021, resulted 10/14/2021) yielded growth of >100,000 colonies/ml of Enterococcus faecalis RESISTANT to Ciprofloxacin, Levofloxacin, Tetracycline and Vancomycin             End-stage renal disease on hemodialysis (HCC) (Chronic) ICD-10-CM: N18.6, Z99.2  ICD-9-CM: 585.6, V45.11  Unknown Yes    Overview Signed 11/23/2021  9:56 AM by Cherylene Falter, MD     HD at [facility] on [facility] on MWF.  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 ([facility] [address] (Chronic) ICD-10-CM: E11.22, N18.6  ICD-9-CM: 250.40, 585.6  Unknown Yes    Overview Signed 11/23/2021  9:50 AM by Cherylene Falter, MD     HbA1c (10/8/2021) = 4.6             Chronic hypotension (Chronic) ICD-10-CM: I95.89  ICD-9-CM: 458.1  Unknown Yes    Overview Signed 11/23/2021 10:01 AM by Cherylene Falter, MD     On Midodrine                   Background:   Past Medical History:   Past Medical History:   Diagnosis Date    Anemia in end-stage renal disease ([facility] [address]     Anticoagulated by anticoagulation treatment     On Apixaban    Chronic hypotension     On Midodrine    Chronic pain     Closed fracture of left inferior pubic ramus, with routine healing, subsequent encounter 11/12/2021    Closed fracture of superior pubic ramus, left, with routine healing, subsequent encounter 11/12/2021    Closed nondisplaced fracture of anterior wall of left acetabulum with routine healing 11/12/2021    Depression     End-stage renal disease on hemodialysis ([facility] [address]     HD at [facility] [place] [facility] on MWF. Tel # [phone]    Gastroesophageal reflux disease     Glaucoma     History of acute pyelonephritis 2/20/2020    History of hydronephrosis 10/5/2021    History of infection with vancomycin resistant Enterococcus (VRE) 10/8/2021    Urine culture (collected 10/8/2021, resulted 10/14/2021) yielded growth of >100,000 colonies/ml of Enterococcus faecalis RESISTANT to Ciprofloxacin, Levofloxacin, Tetracycline and Vancomycin    History of kidney stones     History of recurrent urinary tract infection     History of sepsis 6/18/2021    History of septic shock 10/8/2021    History of urethral stricture     History of urinary tract infection 10/8/2021    Urine culture (collected 10/8/2021, resulted 10/14/2021) yielded growth of >100,000 colonies/ml of Enterococcus faecalis RESISTANT to Ciprofloxacin, Levofloxacin, Tetracycline and Vancomycin    Hyperlipidemia     Hyperphosphatemia 11/14/2021    Hypothyroidism     Lung mass     Mononeuropathy     Involving ring finger of left hand    Need for prophylactic isolation 10/8/2021    Urine culture (collected 10/8/2021, resulted 10/14/2021) yielded growth of >100,000 colonies/ml of Enterococcus faecalis RESISTANT to Ciprofloxacin, Levofloxacin, Tetracycline and Vancomycin    Paroxysmal atrial fibrillation (HCC)     Secondary hyperparathyroidism of renal origin ([facility] [address]     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 ([facility] [address]     HbA1c (10/8/2021) = 4.6    Uric acid nephrolithiasis     Urinary incontinence         Assessment:   Changes in Assessment throughout shift: No change to previous assessment     Patient has a central line: no Reasons if yes: Insertion date: Last dressing date:   Patient has Chua Cath: no Reasons if yes:     Insertion date:  Shift chua care completed: N/a     Last Vitals:     Vitals:    11/29/21 1700 11/29/21 1715 11/29/21 1730 11/29/21 1742   BP: (!) 93/41 (!) 94/50 99/69 (!) 100/48   Pulse: 83 86 67 87   Resp:    18   Temp:    98.1 °F (36.7 °C)   TempSrc:    Oral   SpO2:       Height:            PAIN    Pain Assessment    Pain Intensity 1: 6 (11/29/21 1801) Pain Intensity 1: 2 (12/29/14 1105)    Pain Location 1: Head, Leg Pain Location 1: Abdomen    Pain Intervention(s) 1: Medication (see MAR) Pain Intervention(s) 1: Medication (see MAR)  Patient Stated Pain Goal: 0 Patient Stated Pain Goal: 0  o Intervention effective: yes  o Other actions taken for pain: Medication (see MAR)     Skin Assessment  Skin color Skin Color: Appropriate for ethnicity  Condition/Temperature Skin Condition/Temp: Dry, Warm  Integrity Skin Integrity: Scars (comment) (tank. knee)  Turgor Turgor: Non-tenting  Weekly Pressure Ulcer Documentation  Pressure  Injury Documentation: No Pressure Injury Noted-Pressure Ulcer Prevention Initiated  Wound Prevention & Protection Methods  Orientation of wound Orientation of Wound Prevention: Posterior  Location of Prevention Location of Wound Prevention: Sacrum/Coccyx  Dressing Present Dressing Present : No  Dressing Status    Wound Offloading Wound Offloading (Prevention Methods): Bed, pressure reduction mattress     INTAKE/OUPUT  Date 11/28/21 0700 - 11/29/21 0659 11/29/21 0700 - 11/30/21 0659   Shift 3862-1202 [id number] 24 Hour Total 5206-9600 6786-6922 24 Hour Total   INTAKE   P.O.  240  240     P. O.  240  240   Shift Total  240  240   OUTPUT   Urine  0 0        Urine Voided  0 0        Urine Occurrence(s) 2 x 1 x 3 x      Stool           Stool Occurrence(s) 3 x 1 x 4 x      Dialysis    1000  1000     NET Fluid Removed (mL)    1000  1000   Shift Total  0 0 1000  1000    [phone] -[phone]  -760   Weight (kg)             Recommendations:  1. Patient needs and requests: none    2. Pending tests/procedures: labs ; dialysis MWF    3.  Functional Level/Equipment: Partial (one person) / Walker    Fall Precautions:   Fall risk precautions were reinforced with the patient; he was instructed to call for help prior to getting up. The following fall risk precautions were continued: bed/ chair alarms, door signage, yellow bracelet and socks as well as update of the Oswalde Jesus tool in the patient's room. Freddy Score: 3    HEALS Safety Check    A safety check occurred in the patient's room between off going nurse and oncoming nurse listed above. The safety check included the below items  Area Items   H  High Alert Medications - Verify all high alert medication drips (heparin, PCA, etc.)   E  Equipment - Suction is set up for ALL patients (with adelaide)  - Red plugs utilized for all equipment (IV pumps, etc.)  - WOWs wiped down at end of shift.  - Room stocked with oxygen, suction, and other unit-specific supplies   A  Alarms - Bed alarm is set for fall risk patients  - Ensure chair alarm is in place and activated if patient is up in a chair   L  Lines - Check IV for any infiltration  - Cline bag is empty if patient has a Cline   - Tubing and IV bags are labeled   S  Safety   - Room is clean, patient is clean, and equipment is clean. - Hallways are clear from equipment besides carts. - Fall bracelet on for fall risk patients  - Ensure room is clear and free of clutter  - Suction is set up for ALL patients (with adelaide)  - Hallways are clear from equipment besides carts.    - Isolation precautions followed, supplies available outside room, sign posted     Osvaldo Rogers RN

## 2021-11-29 NOTE — PROGRESS NOTES
Problem: Mobility Impaired (Adult and Pediatric)  Goal: *Therapy Goal (Edit Goal, Insert Text)  Description: Physical Therapy Short Term Goals  Initiated 11/20/2021 and to be accomplished within 7 day(s) on 11/27/2021  1. Patient will move from supine to sit and sit to supine , scoot up and down, and roll side to side in bed with supervision/set-up. MET 11/29/2021  2. Patient will transfer from bed to chair and chair to bed with moderate assistance  using the least restrictive device, consistently maintaining TTWB left LE. MET with PWB Left LE 11/29/2021  3. Patient will perform sit to stand with minimal to moderate assistance without additional assistance left LE to consistently maintain TTWB. MET with PWB Left LE 11/29/2021  4. Patient will perform w/c mobility SBA level over even surfaces for at least 200 ft before fatiguing. MET 11/29/2021  5. Patient will be able to perform static standing for at least 2 minute duration with 1-2 UE support before needing seated rest, maintaining TTWB left LE appropriately. MET with PWB Left LE 11/29/2021    Physical Therapy Long Term Goals  Initiated 11/20/2021 and to be accomplished within 28 day(s) 12/18/2021  1. Patient will move from supine to sit and sit to supine , scoot up and down, and roll side to side in bed with modified independence. 2.  Patient will transfer from bed to chair and chair to bed with modified independence using the least restrictive device. 3.  Patient will perform sit to stand with supervision/set-up. 4.  Patient will perform gait training if and when appropriate based on ability to consistently maintain TTWB left LE. 5.  Patient will perform w/c mobility mod I over even surfaces for at least 200 ft  6. Patient will negotiate w/c ramp with distant supervision.        Outcome: Progressing Towards Goal     PHYSICAL THERAPY WEEKLY PROGRESS NOTE    Patient: Fanny Holland (32 y.o. female)  Date: 11/29/2021  Diagnosis: Multiple closed pelvic fractures without disruption of pelvic ring (HCC) [S32.82XA]   Multiple closed pelvic fractures without disruption of pelvic ring (HCC)  Precautions: Contact, Fall, Skin, PWB (50% Left LE)  Chart, physical therapy assessment, plan of care and goals were reviewed. Time in:930  Time out:1100    Patient seen for: Balance activities; Gait training; Patient education; Transfer training      Pain:  Pt pain was reported as  2/10 at rest, 8/10 with weight bearing pre-treatment. Pt pain was reported as increased pain and discomfort with weight bearing and standing post-treatment. Patient identified with name and : yes    SUBJECTIVE:     Pt is pleasant and cooperative but appears discouraged by pain with functional mobility and stair training requiring encouragement and education re: progress to date. OBJECTIVE DATA SUMMARY:       GROSS ASSESSMENT Weekly Progress Assessment 2021   AROM Generally decreased, functional   Strength Generally decreased, functional   Coordination Within functional limits   Tone Normal   Sensation Intact   PROM Within functional limits       POSTURE Weekly Progress Assessment 2021   Posture (WDL) Exceptions to WDL   Posture Assessment Rounded shoulders;  Forward head       BALANCE Weekly Progress Assessment 2021    Sitting - Static: Good (unsupported)  Sitting - Dynamic: Good (unsupported)  Standing - Static: Fair  Standing - Dynamic : Impaired     BED/CHAIR/WHEELCHAIR TRANSFERS Initial Assessment Weekly Progress Assessment 2021   Rolling Right 4 (Minimal assistance) 5 (Supervision) with increased time to perform 2/2 pain   Rolling Left 4 (Minimal assistance) 5 (Supervision) with increased time to perform 2/2 pain   Supine to Sit 3 (Moderate assistance) (flat head of bed, no railings, needing minimal trunk support) 5 (Supervision) with increased time to perform and advance left LE from mat table to dependent position 2/2 pain   Sit to Stand Moderate assistance (lifting assistance, support left LE to ensure TTWB) Supervision   For safety with increased time to perform 2/2 pain   Sit to Supine 3 (Moderate assistance) (head of bed flat, no rails, assist w/ left LE and reposition) 5 (Supervision) with increased time to perform 2/2 pain and pt utilizing UEs to assist with managing left LE from dependent position onto bed. Transfer Type SPT with walker (mod/max A due to loss of balance and needing recovery) Other   Transfer Assistance Needed  (mod/max A for stabilization and for maintaining TTWB left LE) 5 (Supervision/setup)   Comments   Pt performs stand step transfer with RW bed <> w/c with supervision for safety. Car Transfer Not tested  (CGA) for balance and safety with verbal cues for problem solving performing stand step transfer with RW. Pt utilizes UEs to assist with managing left LE into and out of transfer .     Car Type NT Car Transfer Trainer       [facility] MOBILITY/MANAGEMENT Initial Assessment Weekly Progress Assessment 11/29/2021   Able to Propel (dist) 230 feet 232 feet   Assistance Required 4 Pt propels w/c on unit with modified independence utilizing B UEs and B LEs   Curbs/ramps assistance required 0 (Not tested)  NT   Wheelchair set up assistance required 2 (Maximal assistance)  Modified independence   Wheelchair management Manages left brake, Manages right brake (assistance with armrests, footrests) Manages left brake; Manages right brake       GAIT Weekly Progress Assessment 11/29/2021   Gait Description (WDL) Exceptions to WDL   Gait Abnormalities Decreased step clearance       WALKING INDEPENDENCE Initial Assessment Weekly Progress Assessment 11/29/2021   Assistive device  (NT) Gait belt; Walker, rolling   Ambulation assistance - level surface 0 (Not tested) 5 (Supervision/setup)   Distance 0 Feet (ft) 150 Feet (ft), 45 Feet x 2 trials, 10 Feet x 2 trials, 20 Feet x 2 trials   Comments   Pt requires supervision for safety with intermittent verbal cues for upright posture and heel to toe gait with left foot as pt demonstrates early heel rise on left with weight bearing and education to utilize downward weight bearing to maintain left LE PWB. Ambulation assistance - unlevel surfaces  (NT due to safety)  NT       STEPS/STAIRS Initial Assessment Weekly Progress Assessment 11/29/2021   Steps/Stairs ambulated 0 2 (6\")   Assistance Required   4 (Minimal assistance) for weight acceptance and safety due to increased left LE pain with weight bearing and stair negotiation this treatment session. Rail Use  (NT) Both   Comments     Pt ascends/descends stairs with step to step pattern with maximal encouragement. Curbs/Ramps  (NT due to safety)  NT     Therapeutic Exercises:  3 Sets of 10 Repetitions:  Right and Left Hip Abd/Add with occasional minimal assistance for left LE for full ROM  Right and Left Heel Slides  Attempted sidelying and pt was unable to tolerate 2/2 pain. ASSESSMENT:  Pt is progressing well achieving all short term goals and progressing towards achieving long term goals with improved functional strength and activity tolerance in spite of increased pain. Progression toward goals:  []      Improving appropriately and progressing toward goals  [x]      Improving slowly and progressing toward goals  []      Not making progress toward goals and plan of care will be adjusted     PLAN:  Patient continues to benefit from skilled intervention to address the above impairments. Continue treatment per established plan of care. Emphasize functional strengthening to promote increased safety and independence. Discharge Recommendations:  Home Health Physical Therapy with 24 hour assistance  Further Equipment Recommendations for Discharge:  rolling walker (pt owns)     Estimated Discharge Date: 12/17/2021    Activity Tolerance:   Fair - limited this treatment session 2/2 pain.   Please refer to the flowsheet for vital signs taken during this treatment.   After treatment:   [] Patient left in no apparent distress in bed  [] Patient left in no apparent distress sitting up in chair  [x] Patient left in no apparent distress in w/c mobilizing under own power  [] Patient left in no apparent distress dining area  [] Patient left in no apparent distress mobilizing under own power  [] Call bell left within reach  [] Nursing notified  [] Caregiver present  [] Bed alarm activated   [x] Chair alarm activated      Catrachita Ruiz, PT, DPT  11/29/2021

## 2021-11-29 NOTE — PROGRESS NOTES
RENAL DAILY PROGRESS NOTE            63y F with PMH ESRD admitted in rehab after pelvic fracture, following for ESRD management   Subjective:       Complaint:   Overnight events noted  Higher K today , received lokelma   Discussed plan for HD today     IMPRESSION:   ESRD, MWF  Access; left arm fistula   Anemia , on epogen   H/o chronic UTI h/o right ureteral stent  Close pelvic fracture   Atrial fibrilation    PLAN:   HD today with 2K bath UF goal 2L as tolerated.    Low K renal diet        Discussed with Dr. Deborah Ramsay        Current Facility-Administered Medications   Medication Dose Route Frequency    calcium acetate(phosphat bind) (PHOSLO) capsule 1,334 mg  2 Capsule Oral TID WITH MEALS    lidocaine-prilocaine (EMLA) 2.5-2.5 % cream   Topical BID    midodrine (PROAMATINE) tablet 5 mg  5 mg Oral TID WITH MEALS    allopurinoL (ZYLOPRIM) tablet 100 mg  100 mg Oral Q MON, WED & FRI    epoetin caitlin-epbx (RETACRIT) injection 8,000 Units  8,000 Units SubCUTAneous Q MON, WED & FRI    cetirizine (ZYRTEC) tablet 10 mg  10 mg Oral DAILY    acetaminophen (TYLENOL) tablet 650 mg  650 mg Oral Q4H PRN    bisacodyL (DULCOLAX) tablet 10 mg  10 mg Oral Q48H PRN    amiodarone (CORDARONE) tablet 200 mg  200 mg Oral DAILY    acetaminophen (TYLENOL) tablet 650 mg  650 mg Oral TID    apixaban (ELIQUIS) tablet 5 mg  5 mg Oral BID    HYDROmorphone (DILAUDID) tablet 1 mg  1 mg Oral Q8H PRN    meclizine (ANTIVERT) tablet 12.5 mg  12.5 mg Oral TID PRN    DULoxetine (CYMBALTA) capsule 20 mg  20 mg Oral DAILY    vitamin B complex-folic acid 0.4 mg tablet  1 Tablet Oral DAILY    cyanocobalamin tablet 1,000 mcg  1,000 mcg Oral DAILY    L. acidophilus,casei,rhamnosus (BIO-K PLUS) capsule 1 Capsule  1 Capsule Oral DAILY    ascorbic acid (vitamin C) (VITAMIN C) tablet 500 mg  500 mg Oral DAILY    cholecalciferol (VITAMIN D3) (1000 Units /25 mcg) tablet 2,000 Units  2,000 Units Oral BID    latanoprost (XALATAN) 0.005 % ophthalmic solution 1 Drop  1 Drop Both Eyes QPM    levothyroxine (SYNTHROID) tablet 125 mcg  125 mcg Oral 6am    pantoprazole (PROTONIX) tablet 20 mg  20 mg Oral ACB    ondansetron (ZOFRAN ODT) tablet 4 mg  4 mg Oral TID PRN    lidocaine 4 % patch 1 Patch  1 Patch TransDERmal Q24H    gabapentin (NEURONTIN) capsule 200 mg  200 mg Oral Q MON, WED & FRI    doxercalciferoL (HECTOROL) 4 mcg/2 mL injection 4 mcg  4 mcg IntraVENous DIALYSIS MON, WED & FRI       Review of Symptoms: comprehensive ROS negative except above.    Objective:     Patient Vitals for the past 24 hrs:   Temp Pulse Resp BP SpO2   11/29/21 1415 -- (!) 50 -- (!) 102/49 --   11/29/21 1412 -- (!) 54 -- (!) 105/46 --   11/29/21 1408 (!) 129.2 °F (54 °C) (!) 57 18 (!) 101/53 --   11/29/21 1144 -- 60 -- (!) 118/47 --   11/29/21 0700 96.8 °F (36 °C) (!) 54 15 129/64 96 %   11/28/21 2015 97.2 °F (36.2 °C) 60 18 128/64 100 %   11/28/21 1720 -- (!) 58 -- 137/66 --   11/28/21 1700 -- (!) 58 -- 137/66 --   11/28/21 1600 97.5 °F (36.4 °C) (!) 51 16 (!) 103/46 98 %        Weight change:      11/27 1901 - 11/29 0700  In: 1320 [P.O.:1320]  Out: 0     Intake/Output Summary (Last 24 hours) at 11/29/2021 1439  Last data filed at 11/29/2021 1302  Gross per 24 hour   Intake 1200 ml   Output 0 ml   Net 1200 ml     Physical Exam:   General: comfortable, no acute distress   HEENT sclera anicteric, supple neck, no thyromegaly  CVS: S1S2 heard,  no rub  RS: + air entry b/l,   Abd: Soft, Non tender,  Neuro: non focal, awake, alert , CN II-XII are grossly intact  Extrm: edema, no cyanosis, clubbing   Skin: no visible  Rash  Musculoskeletal: No gross joints or bone deformities         Data Review:     LABS:   Hematology:   Recent Labs     11/29/21  0539   WBC 8.1   HGB 9.0*   HCT 29.7*     Chemistry:   Recent Labs     11/29/21  1008   BUN 74*   CREA 7.68*   CA 9.9   ALB 3.7   K 6.2*   *      CO2 20*   PHOS 8.4*   *            Procedures/imaging: see electronic medical records for all procedures, Xrays and details which were not copied into this note but were reviewed prior to creation of Plan          Assessment & Plan:     As above         Beth Mclaughlin MD  11/29/2021  2:33 PM

## 2021-11-29 NOTE — PROGRESS NOTES
Problem: Risk for Spread of Infection  Goal: Prevent transmission of infectious organism to others  Description: Prevent the transmission of infectious organisms to other patients, staff members, and visitors. Outcome: Progressing Towards Goal     Problem: Patient Education:  Go to Education Activity  Goal: Patient/Family Education  Outcome: Progressing Towards Goal     Problem: Falls - Risk of  Goal: *Absence of Falls  Description: Document Leslee Garcia Fall Risk and appropriate interventions in the flowsheet. Outcome: Progressing Towards Goal  Note: Fall Risk Interventions:  Mobility Interventions: Assess mobility with egress test, Bed/chair exit alarm, Patient to call before getting OOB, Utilize walker, cane, or other assistive device, Utilize gait belt for transfers/ambulation    Mentation Interventions: Adequate sleep, hydration, pain control, Bed/chair exit alarm, Gait belt with transfers/ambulation, Increase mobility, More frequent rounding, Room close to nurse's station, Self-releasing belt, Toileting rounds, Update white board    Medication Interventions: Patient to call before getting OOB, Teach patient to arise slowly, Bed/chair exit alarm    Elimination Interventions: Bed/chair exit alarm, Call light in reach, Patient to call for help with toileting needs, Stay With Me (per policy), Toilet paper/wipes in reach, Toileting schedule/hourly rounds    History of Falls Interventions: Bed/chair exit alarm, Door open when patient unattended, Room close to nurse's station         Problem: Patient Education: Go to Patient Education Activity  Goal: Patient/Family Education  Outcome: Progressing Towards Goal     Problem: Pressure Injury - Risk of  Goal: *Prevention of pressure injury  Description: Document Giovany Scale and appropriate interventions in the flowsheet.   Outcome: Progressing Towards Goal  Note: Pressure Injury Interventions:  Sensory Interventions: Assess changes in LOC, Chair cushion, Check visual cues for pain, Float heels, Keep linens dry and wrinkle-free, Maintain/enhance activity level, Minimize linen layers, Pressure redistribution bed/mattress (bed type)    Moisture Interventions: Absorbent underpads, Maintain skin hydration (lotion/cream), Minimize layers, Moisture barrier, Offer toileting Q_hr    Activity Interventions: Chair cushion, Pressure redistribution bed/mattress(bed type), Increase time out of bed    Mobility Interventions: Chair cushion, Float heels, HOB 30 degrees or less, Pressure redistribution bed/mattress (bed type)    Nutrition Interventions: Document food/fluid/supplement intake                     Problem: Patient Education: Go to Patient Education Activity  Goal: Patient/Family Education  Outcome: Progressing Towards Goal     Problem: Pain  Goal: *Control of Pain  Outcome: Progressing Towards Goal     Problem: Patient Education: Go to Patient Education Activity  Goal: Patient/Family Education  Outcome: Progressing Towards Goal

## 2021-11-29 NOTE — PROGRESS NOTES
SHIFT CHANGE NOTE FOR [facility]    Bedside and Verbal shift change report given to JOSE ANTONIO Solitario (oncoming nurse) by Farnaz Arellano RN (offgoing nurse). Report included the following information SBAR, Kardex, MAR and Recent Results.     Situation:   Code Status: DNR   Hospital Day: 10   Problem List:   Hospital Problems  Date Reviewed: 11/28/2021          Codes Class Noted POA    Mononeuropathy (Chronic) ICD-10-CM: G58.9  ICD-9-CM: 355.9  Unknown Yes    Overview Signed 11/28/2021 11:29 AM by Logan Romero MD     Involving ring finger of left hand             * (Principal) Multiple closed pelvic fractures without disruption of pelvic ring ([facility] [address] ICD-10-CM: T81.83MQ  ICD-9-CM: 808.44  11/19/2021 Yes        Hyperphosphatemia ICD-10-CM: E83.39  ICD-9-CM: 275.3  11/14/2021 Yes        Anticoagulated by anticoagulation treatment ICD-10-CM: Z79.01  ICD-9-CM: V58.61  Unknown Yes    Overview Signed 11/23/2021  9:49 AM by Logan Romero MD     On Apixaban             Paroxysmal atrial fibrillation (HCC) (Chronic) ICD-10-CM: I48.0  ICD-9-CM: 427.31  Unknown Yes        Closed fracture of left inferior pubic ramus, with routine healing, subsequent encounter ICD-10-CM: S32.592D  ICD-9-CM: V54.13  11/12/2021 Yes        Closed nondisplaced fracture of anterior wall of left acetabulum with routine healing ICD-10-CM: S32.415D  ICD-9-CM: V54.19  11/12/2021 Yes        Closed fracture of superior pubic ramus, left, with routine healing, subsequent encounter ICD-10-CM: S32.512D  ICD-9-CM: V54.19  11/12/2021 Yes        History of infection with vancomycin resistant Enterococcus (VRE) ICD-10-CM: J08.34  ICD-9-CM: V12.09  10/8/2021 Yes    Overview Signed 11/23/2021  9:51 AM by Logan Romero MD     Urine culture (collected 10/8/2021, resulted 10/14/2021) yielded growth of >100,000 colonies/ml of Enterococcus faecalis RESISTANT to Ciprofloxacin, Levofloxacin, Tetracycline and Vancomycin             History of urinary tract infection ICD-10-CM: Z87.440  ICD-9-CM: V13.02  10/8/2021 Yes    Overview Signed 11/23/2021  9:52 AM by Patrizia Hobbs MD     Urine culture (collected 10/8/2021, resulted 10/14/2021) yielded growth of >100,000 colonies/ml of Enterococcus faecalis RESISTANT to Ciprofloxacin, Levofloxacin, Tetracycline and Vancomycin             Need for prophylactic isolation ICD-10-CM: Z41.8  ICD-9-CM: V07.0  10/8/2021 Yes    Overview Signed 11/23/2021  9:52 AM by Patrizia Hobbs MD     Urine culture (collected 10/8/2021, resulted 10/14/2021) yielded growth of >100,000 colonies/ml of Enterococcus faecalis RESISTANT to Ciprofloxacin, Levofloxacin, Tetracycline and Vancomycin             End-stage renal disease on hemodialysis (HCC) (Chronic) ICD-10-CM: N18.6, Z99.2  ICD-9-CM: 585.6, V45.11  Unknown Yes    Overview Signed 11/23/2021  9:56 AM by Patrizia Hobbs MD     HD at [facility] on [facility] on MWF.  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 ([facility] [address] (Chronic) ICD-10-CM: E11.22, N18.6  ICD-9-CM: 250.40, 585.6  Unknown Yes    Overview Signed 11/23/2021  9:50 AM by Patrizia Hobbs MD     HbA1c (10/8/2021) = 4.6             Chronic hypotension (Chronic) ICD-10-CM: I95.89  ICD-9-CM: 458.1  Unknown Yes    Overview Signed 11/23/2021 10:01 AM by Patrizia Hobbs MD     On Midodrine                   Background:   Past Medical History:   Past Medical History:   Diagnosis Date    Anemia in end-stage renal disease ([facility] [address]     Anticoagulated by anticoagulation treatment     On Apixaban    Chronic hypotension     On Midodrine    Chronic pain     Closed fracture of left inferior pubic ramus, with routine healing, subsequent encounter 11/12/2021    Closed fracture of superior pubic ramus, left, with routine healing, subsequent encounter 11/12/2021    Closed nondisplaced fracture of anterior wall of left acetabulum with routine healing 11/12/2021    Depression     End-stage renal disease on hemodialysis ([facility] [address]     HD at [address] [place] on [employer] on [address]. Tel # [phone]    Gastroesophageal reflux disease     Glaucoma     History of acute pyelonephritis 2/20/2020    History of hydronephrosis 10/5/2021    History of infection with vancomycin resistant Enterococcus (VRE) 10/8/2021    Urine culture (collected 10/8/2021, resulted 10/14/2021) yielded growth of >100,000 colonies/ml of Enterococcus faecalis RESISTANT to Ciprofloxacin, Levofloxacin, Tetracycline and Vancomycin    History of kidney stones     History of recurrent urinary tract infection     History of sepsis 6/18/2021    History of septic shock 10/8/2021    History of urethral stricture     History of urinary tract infection 10/8/2021    Urine culture (collected 10/8/2021, resulted 10/14/2021) yielded growth of >100,000 colonies/ml of Enterococcus faecalis RESISTANT to Ciprofloxacin, Levofloxacin, Tetracycline and Vancomycin    Hyperlipidemia     Hyperphosphatemia 11/14/2021    Hypothyroidism     Lung mass     Mononeuropathy     Involving ring finger of left hand    Need for prophylactic isolation 10/8/2021    Urine culture (collected 10/8/2021, resulted 10/14/2021) yielded growth of >100,000 colonies/ml of Enterococcus faecalis RESISTANT to Ciprofloxacin, Levofloxacin, Tetracycline and Vancomycin    Paroxysmal atrial fibrillation (HCC)     Secondary hyperparathyroidism of renal origin ([facility] [address]     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 ([facility] [address]     HbA1c (10/8/2021) = 4.6    Uric acid nephrolithiasis     Urinary incontinence         Assessment:   Changes in Assessment throughout shift: No change to previous assessment     Patient has a central line: no Reasons if yes: Insertion date: Last dressing date:   Patient has Chua Cath: no Reasons if yes:     Insertion date:  Shift chua care completed: N/a     Last Vitals:     Vitals:    11/28/21 1600 11/28/21 1700 11/28/21 1720 11/28/21 2015   BP: (!) 103/46 137/66 137/66 128/64   Pulse: (!) 51 (!) 58 (!) 58 60   Resp: 16   18   Temp: 97.5 °F (36.4 °C)   97.2 °F (36.2 °C)   TempSrc:       SpO2: 98%   100%   Height:            PAIN    Pain Assessment    Pain Intensity 1: 0 (11/29/21 0425) Pain Intensity 1: 2 (12/29/14 1105)    Pain Location 1: Leg Pain Location 1: Abdomen    Pain Intervention(s) 1: Medication (see MAR) Pain Intervention(s) 1: Medication (see MAR)  Patient Stated Pain Goal: 0 Patient Stated Pain Goal: 0  o Intervention effective: yes  o Other actions taken for pain: Medication (see MAR)     Skin Assessment  Skin color Skin Color: Appropriate for ethnicity  Condition/Temperature Skin Condition/Temp: Dry, Warm  Integrity Skin Integrity: Scars (comment) (bilateral knee; C/D/I)  Turgor Turgor: Non-tenting  Weekly Pressure Ulcer Documentation  Pressure  Injury Documentation: No Pressure Injury Noted-Pressure Ulcer Prevention Initiated  Wound Prevention & Protection Methods  Orientation of wound Orientation of Wound Prevention: Posterior  Location of Prevention Location of Wound Prevention: Sacrum/Coccyx  Dressing Present Dressing Present : No  Dressing Status    Wound Offloading Wound Offloading (Prevention Methods): Bed, pressure reduction mattress     INTAKE/OUPUT  Date 11/28/21 0700 - 11/29/21 0659 11/29/21 0700 - 11/30/21 0659   Shift 1352-9457 7451-2315 24 Hour Total 8321-5907 0270-6841 24 Hour Total   INTAKE   P.O.         P. O.       Shift Total       OUTPUT   Urine  0 0        Urine Voided  0 0        Urine Occurrence(s) 2 x 1 x 3 x      Stool           Stool Occurrence(s) 3 x 1 x 4 x      Shift Total  0 0       [phone]      Weight (kg)             Recommendations:  1. Patient needs and requests: none    2. Pending tests/procedures: labs ; dialysis MWF    3. Functional Level/Equipment: Partial (one person) / Hermon Southward;  Wheelchair    Fall Precautions:   Fall risk precautions were reinforced with the patient; he was instructed to call for help prior to getting up. The following fall risk precautions were continued: bed/ chair alarms, door signage, yellow bracelet and socks as well as update of the Earma Olivia tool in the patient's room. Freddy Score: 3    HEALS Safety Check    A safety check occurred in the patient's room between off going nurse and oncoming nurse listed above. The safety check included the below items  Area Items   H  High Alert Medications - Verify all high alert medication drips (heparin, PCA, etc.)   E  Equipment - Suction is set up for ALL patients (with adelaide)  - Red plugs utilized for all equipment (IV pumps, etc.)  - WOWs wiped down at end of shift.  - Room stocked with oxygen, suction, and other unit-specific supplies   A  Alarms - Bed alarm is set for fall risk patients  - Ensure chair alarm is in place and activated if patient is up in a chair   L  Lines - Check IV for any infiltration  - Cline bag is empty if patient has a Cline   - Tubing and IV bags are labeled   S  Safety   - Room is clean, patient is clean, and equipment is clean. - Hallways are clear from equipment besides carts. - Fall bracelet on for fall risk patients  - Ensure room is clear and free of clutter  - Suction is set up for ALL patients (with adelaide)  - Hallways are clear from equipment besides carts.    - Isolation precautions followed, supplies available outside room, sign posted     Lucho Howard RN

## 2021-11-29 NOTE — INTERDISCIPLINARY ROUNDS
[facility] PHYSICAL REHABILITATION  [address] [place] [address], [address]    INPATIENT REHABILITATION  PRE-TEAM CONFERENCE SUMMARY     Date of Conference: 11/30/2021    Patient Information:        Name: Stevenson Phalen Age / Sex: 64 y.o. / female   CSN: [id number] MRN: [id number]   Admit Date: 11/19/2021 Length of Stay: 10 days     Primary Rehabilitation Diagnosis  1. Impaired Mobility and ADLs  2. 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 ([facility]) E11.22, N18.6    Secondary hyperparathyroidism of renal origin ([facility] [address] N25.81    Gastroesophageal reflux disease K21.9    History of recurrent urinary tract infection Z87.440    History of sepsis Z86.19    End-stage renal disease on hemodialysis ([facility]) N18.6, Z99.2    History of hydronephrosis Z87.448    History of septic shock Z86.19    Anticoagulated by anticoagulation treatment Z79.01    Paroxysmal atrial fibrillation ([facility]) I48.0    Closed fracture of left inferior pubic ramus, with routine healing, subsequent encounter S32.592D    Closed nondisplaced fracture of anterior wall of left acetabulum with routine healing S32.415D    Closed fracture of superior pubic ramus, left, with routine healing, subsequent encounter S32.512D    Multiple closed pelvic fractures without disruption of pelvic ring ([address] S32.82XA    Depression F32. A    History of urinary tract infection Z87.440    Need for prophylactic isolation Z41.8    Hyperlipidemia E78.5    Hypothyroidism E03.9    History of kidney stones Z87.442    Chronic pain G89.29    Lung mass R91.8    History of urethral stricture Z87.448    Uric acid nephrolithiasis N20.0    Urinary incontinence R32    Glaucoma H40.9    Hyperphosphatemia E83.39    Mononeuropathy G58.9    Hyperkalemia E87.5          Therapy:     FIM SCORES Initial Assessment Weekly Progress Assessment 11/29/2021   Eating Functional Level: 5  Comments: setup on bedside table  6   Swallowing     Grooming 5 Grooming Assistance : 5 (Supervision) (pt washed face and shampooed hair in shower)   Bathing 3 Bathing Assistance, Upper: 5 (Supervision)  Position Performed: Seated in chair  Bathing Assistance, Lower : 5 (Supervision)  Position Performed: Seated in chair; Standing   Upper Body Dressing Functional Level: 4  Items Applied/Steps Completed: Pullover (4 steps)  Comments: set up while seated on EOB Dressing Assistance : 5 (Supervision) (setup)   Lower Body Dressing Functional Level: 3  Items Applied/Steps Completed: Shoe, left (1 step), Shoe, right (1 step), Sock, left (1 step), Sock, right (1 step), Elastic waist pants (3 steps)  Comments: min vc to direction for safety to stand & pull pants over hips Dressing Assistance : 5 (Supervision) (setup)  Leg Crossed Method Used: Yes  Position Performed: Seated in chair; Standing  Adaptive Equipment Used: Walker   Toileting Functional Level: 3  Comments: clothing management wearing scrubs due to not having her clothes here yet  5   Bladder 1 0   Bowel  1 1   Toilet Transfer [place] Toilet Transfer Score: 4  Comments: rw     Tub/Shower Transfer [place] Tub or Shower Type: Shower (using tub transfer bench)  Tub/Shower Transfer Score: 4  Comments: uses rw & TTWB Shower (using tub transfer bench)  5 (Supervision/setup) (vcs for tech)   Comprehension Primary Mode of Comprehension: Auditory  Score: 5        Expression Primary Mode of Expression: Verbal  Score: 5  Comments: expresses her needs appropriately        Social Interaction Score: 4  Comments: viji & nice     Problem Solving Score: 4  Comments: she knew to don her shoes so not to slide on the floor with her socks on before we used RW to go from her bed to chair     Memory Score: 4  Comments: appears WNL with sharing her medical hx & family dynamics       FIM SCORES Initial Assessment Weekly Progress Assessment 11/29/2021   Bed/Chair/Wheelchair Transfers Transfer Type: SPT with walker (mod/max A due to loss of balance and needing recovery)  Other: lateral transfer with transfer board (needing mod/max A)  Transfer Assistance :  (mod/max A for stabilization and for maintaining TTWB left LE)  Sit to Stand Assistance: Moderate assistance (lifting assistance, support left LE to ensure TTWB)  Car Transfers: Not tested  Car Type: NT Transfer Type:  Other  Other: stand step with RW  Transfer Assistance : 5 (Supervision/setup)  Sit to Stand Assistance: Supervision   Bed Mobility Rolling Right 4 (Minimal assistance)   Rolling Left 4 (Minimal assistance)   Supine to Sit 3 (Moderate assistance) (flat head of bed, no railings, needing minimal trunk support)   Sit to Stand Moderate assistance (lifting assistance, support left LE to ensure TTWB)   Sit to Supine 3 (Moderate assistance) (head of bed flat, no rails, assist w/ left LE and reposition)    Rolling Right   5 (Supervision)   Rolling Left   5 (Supervision)   Supine to Sit   5 (Supervision)   Sit to Stand   Supervision   Sit to Supine   5 (Supervision)      Locomotion (W/C) Able to Propel (ft): 230 feet  Functional Level: 4  Curbs/Ramps Assist Required (FIM Score): 0 (Not tested)  Wheelchair Setup Assist Required : 2 (Maximal assistance)  Wheelchair Management: Manages left brake, Manages right brake (assistance with armrests, footrests) Function 6  Setup Assistance         Locomotion (W/C distance)   232 feet   Locomotion (Walk) 0 (Not tested) 5 (Supervision/setup)  Gait belt; Walker, rolling   Locomotion (Walk dist.) 0 Feet (ft) 150 Feet (ft)   Steps/Stairs Steps/Stairs Ambulated (#): 0  Level of Assist : 0 (Not tested)  Rail Use:  (NT) Steps/Stairs Ambulated (#): 2 (6\")  Level of Assist : Minimal Assistance  Rail Use:  Bilateral         Nursing:     Neuro:   AAA&O x   4         Respiratory:   [x] WNL   [] O2 LPM:   Other:  Peripheral Vascular:   [] TEDS present   [] Edema present ____ Grade   Cardiac:   [x] WNL   [] Other  Genitourinary:   [] continent   [] incontinent   [] chua  Abdominal _______ LBM  GI: _reg.______ Diet _thin_____ Liquids _____ tube feeds  Musculoskeletal: ____ ROM Transfers _____ Assistive Device Used  ____ Level of Assistance  Skin Integumentary:   [x] Intact   [] Not Intact   __________Preventative Measures  Details______________________________________________________________  Pain: [] Controlled   [] Not Controlled   Pain Meds:   [] Scheduled   [x] PRN        Registered Dietitian / Nutrition:   No data found. Pt off unit at time of visit. Has good meal intake per chart documentation, %. Tolerating diet. Supplements:          [x] Yes: Magic Cup TID    [] No      Amount of supplement consumed:  Unable to assess at time of visit      Intake/Output Summary (Last 24 hours) at 11/29/2021 1228  Last data filed at 11/29/2021 0767  Gross per 24 hour   Intake 1080 ml   Output 0 ml   Net 1080 ml                                Last bowel movement: 11/28      Interdisciplinary Team Goals:     1. Discipline  Physical Therapy    Goal  Encourage pt to perform functional transfers with RW consistently at a modified independence level. Barrier  PWB left LE, Impaired functional strength and balance, left LE pain    Intervention  Education, Transfer training, Strength and Balance training    Goal written by:   Yessenia Gu PT, DPT     2.  Discipline  Occupational Therapy    Goal  Missy with bathing and dressing following weightbearing precautions    Barrier Decreased strength, balance, and weightbearing precautions    Intervention  Therex, Theract, NMRE, Self care Retraining    Goal written by:  Mario Alberto He, MSOTR/L      3. Discipline  Speech Therapy    Goal      Barrier      Intervention      Goal written by:       4. Discipline  Nursing    Goal  pain control    Barrier  pelvic fx. S/p surgery    Intervention  pain meds as scheduled and prn    Goal written by:  Michelle Herring     5. Discipline  Clinical Psychology    Goal  Maintain mood stability in continued recovery    Barrier  Stress with injury and forced dependency in recovery    Intervention  Support  as needed    Goal written by:  Marcellus Schulz, PhD     6. Discipline  Nutrition / Dietetics    Goal  - PO nutrition intake will meet >75% of patient estimated nutritional needs within the next 7 days. Barrier  none known     Intervention  continue po diet and nutrition supplements. Monitor po intake of meals/ supplements     Goal written by:  Angelica Damon RD       Disposition / Discharge Planning:      Follow-up services:  [x] Physical Therapy             [x] Occupational Therapy       [] Speech Therapy           [] Skilled Nursing      [] Medical Social Worker   [] Aide        [] Outpatient      [] vs   [x] Home Health  [] vs       [x] to progress to outpatient       [] with 24-hour supervision       [x] with 24-hour assistance   [] Marc ESTRADA recommendations:  Pt owns RW   Estimated discharge date:  12/17/2021   Discharge Location:  [x] Home  [] versus    [] Marc White    [] [facility] Kevin   [] Other:           Electronic Signatures:      Signature Date Signed   Physical Therapist    Yelena Denis PT, DPT 11/29/2021    Occupational Therapist    Mario Alberto He, MSOTR/L   11/29/21   Speech Therapist         Recreational Therapist    Jose Roberto Love, CTRS 11/30/2021   Nursing    Michelle Herring  11/29/2021   Dietitian    Angelica Damon, [address] [place] [address]  11/29/2021 Clinical Psychologist    Jose Antonio Arevalo, PhD  11/30/2021    Physician    Jai Toure MD   11/29/2021        Shelle Kussmaul, MSW  11/29/2021     Opportunity to share with family/caregiver[] YES [] NO    Relationship to patient____________________________________________________      The above information has been reviewed with the patient in a language that they can understand. Opportunity for comments and questions has been provided and a signed attestation has been scanned into the \"media tab\" of the EMR.       Patient Signature: ______________________________________________________    Date Signed: __________________________________________________________

## 2021-11-29 NOTE — PROGRESS NOTES
[facility] PHYSICAL REHABILITATION  [address] [place] [address], [address]     INPATIENT REHABILITATION  DAILY PROGRESS NOTE     Date: 11/29/2021    Name: Mojgan Snowden Age / Sex: 64 y.o. / female   CSN: [id number] MRN: [id number]   [facility] Date: 11/19/2021 Length of Stay: 10 days     Primary Rehab Diagnosis: Impaired Mobility and ADLs secondary to Multiple closed pelvic fractures (comminuted fracture involving the left anterior acetabular wall with involvement of the base of the left superior pubic ramus which are not significantly displaced; nondisplaced left inferior pubic ramus fracture)      Subjective:     Patient seen and examined. Blood pressure controlled. Patient's Complaint:   No significant medical complaints    Pain Control: stable, mild-to-moderate joint symptoms intermittently, reasonably well controlled by current meds      Objective:     Vital Signs:  Patient Vitals for the past 24 hrs:   BP Temp Pulse Resp SpO2   11/29/21 1144 (!) 118/47 -- 60 -- --   11/29/21 0700 129/64 96.8 °F (36 °C) (!) 54 15 96 %   11/28/21 2015 128/64 97.2 °F (36.2 °C) 60 18 100 %   11/28/21 1720 137/66 -- (!) 58 -- --   11/28/21 1700 137/66 -- (!) 58 -- --   11/28/21 1600 (!) 103/46 97.5 °F (36.4 °C) (!) 51 16 98 %        Physical Examination:  GENERAL SURVEY: Patient is awake, alert, oriented x 3, sitting comfortably on the chair, not in acute respiratory distress.   HEENT: pink palpebral conjunctivae, anicteric sclerae, no nasoaural discharge, moist oral mucosa  NECK: supple, no jugular venous distention, no palpable lymph nodes  CHEST/LUNGS: symmetrical chest expansion, good air entry, clear breath sounds  HEART: adynamic precordium, good S1 S2, no S3, regular rhythm, no murmurs  ABDOMEN: obese, bowel sounds appreciated, soft, non-tender  EXTREMITIES: pink nailbeds, no edema, full and equal pulses, no calf tenderness   NEUROLOGICAL EXAM: The patient is awake, alert and oriented x3, able to answer questions fairly appropriately, able to follow 1 and 2 step commands. Able to tell time from the wall clock. Cranial nerves II-XII are grossly intact. No gross sensory deficit. Motor strength is 4 to 4+/5 on BUE and RLE, 2+ to 3-/5 on the left hip, 3 to 3+/5 on the left knee and left ankle.        Current Medications:  Current Facility-Administered Medications   Medication Dose Route Frequency    sodium zirconium cyclosilicate (LOKELMA) powder packet 10 g  10 g Oral NOW    calcium acetate(phosphat bind) (PHOSLO) capsule 667 mg  1 Capsule Oral TID WITH MEALS    lidocaine-prilocaine (EMLA) 2.5-2.5 % cream   Topical BID    midodrine (PROAMATINE) tablet 5 mg  5 mg Oral TID WITH MEALS    allopurinoL (ZYLOPRIM) tablet 100 mg  100 mg Oral Q MON, WED & FRI    epoetin caitlin-epbx (RETACRIT) injection 8,000 Units  8,000 Units SubCUTAneous Q MON, WED & FRI    cetirizine (ZYRTEC) tablet 10 mg  10 mg Oral DAILY    acetaminophen (TYLENOL) tablet 650 mg  650 mg Oral Q4H PRN    bisacodyL (DULCOLAX) tablet 10 mg  10 mg Oral Q48H PRN    amiodarone (CORDARONE) tablet 200 mg  200 mg Oral DAILY    acetaminophen (TYLENOL) tablet 650 mg  650 mg Oral TID    apixaban (ELIQUIS) tablet 5 mg  5 mg Oral BID    HYDROmorphone (DILAUDID) tablet 1 mg  1 mg Oral Q8H PRN    meclizine (ANTIVERT) tablet 12.5 mg  12.5 mg Oral TID PRN    DULoxetine (CYMBALTA) capsule 20 mg  20 mg Oral DAILY    vitamin B complex-folic acid 0.4 mg tablet  1 Tablet Oral DAILY    cyanocobalamin tablet 1,000 mcg  1,000 mcg Oral DAILY    L. acidophilus,casei,rhamnosus (BIO-K PLUS) capsule 1 Capsule  1 Capsule Oral DAILY    ascorbic acid (vitamin C) (VITAMIN C) tablet 500 mg  500 mg Oral DAILY    cholecalciferol (VITAMIN D3) (1000 Units /25 mcg) tablet 2,000 Units  2,000 Units Oral BID    latanoprost (XALATAN) 0.005 % ophthalmic solution 1 Drop  1 Drop Both Eyes QPM    levothyroxine (SYNTHROID) tablet 125 mcg  125 mcg Oral 6am    pantoprazole (PROTONIX) tablet 20 mg  20 mg Oral ACB    ondansetron (ZOFRAN ODT) tablet 4 mg  4 mg Oral TID PRN    lidocaine 4 % patch 1 Patch  1 Patch TransDERmal Q24H    gabapentin (NEURONTIN) capsule 200 mg  200 mg Oral Q MON, WED & FRI    doxercalciferoL (HECTOROL) 4 mcg/2 mL injection 4 mcg  4 mcg IntraVENous DIALYSIS MON, WED & FRI       Allergies: Allergies   Allergen Reactions    Albumin, Human 25 % Itching     Headache - severe migraine like, itchy eyes, runny nose    Ciprofloxacin Hives    Cyclopentolate Unknown (comments)    Iron Sucrose Diarrhea    Statins-Hmg-Coa Reductase Inhibitors Other (comments)     Body ache       Lab/Data Review:  Recent Results (from the past 24 hour(s))   CBC WITH AUTOMATED DIFF    Collection Time: 11/29/21  5:39 AM   Result Value Ref Range    WBC 8.1 4.6 - 13.2 K/uL    RBC 2.88 (L) 4.20 - 5.30 M/uL    HGB 9.0 (L) 12.0 - 16.0 g/dL    HCT 29.7 (L) 35.0 - 45.0 %    .1 (H) 78.0 - 100.0 FL    MCH 31.3 24.0 - 34.0 PG    MCHC 30.3 (L) 31.0 - 37.0 g/dL    RDW 16.7 (H) 11.6 - 14.5 %    PLATELET 233 595 - 723 K/uL    MPV 9.8 9.2 - 11.8 FL    NRBC 0.0 0  WBC    ABSOLUTE NRBC 0.00 0.00 - 0.01 K/uL    NEUTROPHILS 49 40 - 73 %    LYMPHOCYTES 36 21 - 52 %    MONOCYTES 11 (H) 3 - 10 %    EOSINOPHILS 2 0 - 5 %    BASOPHILS 1 0 - 2 %    IMMATURE GRANULOCYTES 1 (H) 0.0 - 0.5 %    ABS. NEUTROPHILS 4.0 1.8 - 8.0 K/UL    ABS. LYMPHOCYTES 3.0 0.9 - 3.6 K/UL    ABS. MONOCYTES 0.9 0.05 - 1.2 K/UL    ABS. EOSINOPHILS 0.2 0.0 - 0.4 K/UL    ABS. BASOPHILS 0.1 0.0 - 0.1 K/UL    ABS. IMM.  GRANS. 0.1 (H) 0.00 - 0.04 K/UL    DF AUTOMATED     RENAL FUNCTION PANEL    Collection Time: 11/29/21 10:08 AM   Result Value Ref Range    Sodium 135 (L) 136 - 145 mmol/L    Potassium 6.2 (HH) 3.5 - 5.5 mmol/L    Chloride 101 100 - 111 mmol/L    CO2 20 (L) 21 - 32 mmol/L    Anion gap 14 3.0 - 18 mmol/L    Glucose 138 (H) 74 - 99 mg/dL    BUN 74 (H) 7.0 - 18 MG/DL    Creatinine 7.68 (H) 0.6 - 1.3 MG/DL    BUN/Creatinine ratio 10 (L) 12 - 20      GFR est AA 6 (L) >60 ml/min/1.73m2    GFR est non-AA 5 (L) >60 ml/min/1.73m2    Calcium 9.9 8.5 - 10.1 MG/DL    Phosphorus 8.4 (H) 2.5 - 4.9 MG/DL    Albumin 3.7 3.4 - 5.0 g/dL       Assessment:     Primary Rehabilitation Diagnosis  1. Impaired Mobility and ADLs  2. 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) E11.22, N18.6    Secondary hyperparathyroidism of renal origin ([facility] [address] N25.81    Gastroesophageal reflux disease K21.9    History of recurrent urinary tract infection Z87.440    History of sepsis Z86.19    End-stage renal disease on hemodialysis ([facility]) N18.6, Z99.2    History of hydronephrosis Z87.448    History of septic shock Z86.19    Anticoagulated by anticoagulation treatment Z79.01    Paroxysmal atrial fibrillation ([facility]) I48.0    Closed fracture of left inferior pubic ramus, with routine healing, subsequent encounter S32.592D    Closed nondisplaced fracture of anterior wall of left acetabulum with routine healing S32.415D    Closed fracture of superior pubic ramus, left, with routine healing, subsequent encounter S32.512D    Multiple closed pelvic fractures without disruption of pelvic ring ([facility] [address] S32.82XA    Depression F32. A    History of urinary tract infection Z87.440    Need for prophylactic isolation Z41.8    Hyperlipidemia E78.5    Hypothyroidism E03.9    History of kidney stones Z87.442    Chronic pain G89.29    Lung mass R91.8    History of urethral stricture Z87.448    Uric acid nephrolithiasis N20.0    Urinary incontinence R32    Glaucoma H40.9    Hyperphosphatemia E83.39    Mononeuropathy G58.9    Hyperkalemia E87.5       Plan:     1. Justification for continued stay: Good progression towards established rehabilitation goals. 2. Medical Issues being followed closely:    [x]  Fall and safety precautions     []  Wound Care     [x]  Bowel and Bladder Function     [x]  Fluid Electrolyte and Nutrition Balance     [x]  Pain Control      3. Issues that 24 hour rehabilitation nursing is following:    [x]  Fall and safety precautions     []  Wound Care     [x]  Bowel and Bladder Function     [x]  Fluid Electrolyte and Nutrition Balance     [x]  Pain Control      [x]  Assistance with and education on in-room safety with transfers to and from the bed, wheelchair, toilet and shower. 4. Acute rehabilitation plan of care:    [x]  Continue current care and rehab. [x]  Physical Therapy           [x]  Occupational Therapy           []  Speech Therapy     []  Hold Rehab until further notice     5. Medications:    [x]  MAR Reviewed     [x]  Continue Present Medications     6. DVT Prophylaxis:      []  Enoxaparin     []  Unfractionated Heparin     []  Warfarin     [x]  NOAC     []  AMELIE Stockings     []  Sequential Compression Device     []  None     7. Code status    []  Full code     []  Partial code     []  Do not intubate     [x]  Do not resuscitate     8.  Orders:   > Multiple closed pelvic fractures (comminuted fracture involving the left anterior acetabular wall with involvement of the base of the left superior pubic ramus which are not significantly displaced; nondisplaced left inferior pubic ramus fracture)   > Orthopedic surgery recommending non-surgical management with PT, pain control   > Partial (50%) weightbearing on the left lower extremity     > Urinary tract infection, History of right ureteral stent   > Urine culture (collected 11/16/2021, resulted 11/20/2021) yielded growth of >100,000 colonies/ml of Proteus mirabilis RESISTANT to Nitrofurantoin   > Started on IV ceftriaxone and transitioned to cefpodoxime   > On discharge, patient is to follow up with Urology (Dr. Dean Reed)   > On 11/25/2021, patient had completed the recommended treatment course of Cefpodoxime 200 mg PO q 24 hr     > History of VRE urinary tract infection, 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)   > Urine culture (collected 10/8/2021, resulted 10/14/2021) yielded growth of >100,000 colonies/ml of Enterococcus faecalis RESISTANT to Ciprofloxacin, Levofloxacin, Tetracycline and Vancomycin   > Contact isolation    > Paroxysmal atrial fibrillation, anticoagulated on Apixaban   > Anticoagulation    > Continue Apixaban 5 mg PO BID    > Rate-control    > None   > Maintenance of sinus rhythm    > Continue Amiodarone 200 mg PO once daily    > Glaucoma    > Continue Latanoprost 0.005% ophthalmic solution, 1 drop into each ete q PM    > Hypothyroidism   > TSH (11/13/2021) = 11.4   > TSH (11/22/2021) = 1.69   > Free T4 (11/22/2021) = 1.1   > Continue Levothyroxine 125 mcg PO once daily     > Chronic hypotension   > On 11/23/2021, decreased Midodrine from 10 mg to 5 mg PO TID with meals   > Continue Midodrine 5 mg PO TID with meals     > End-stage renal disease, on hemodialysis (Mon-Wed-Fri);  Secondary hyperparathyroidism of renal origin   > Nephrology consult (Dr. 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 Allergy to albumin   > Will list as allergy per pt and daughter-in-law request.  Start antihistamine. Monitor.    > Hyperkalemia      11/24/21  0539 11/22/21  0527 11/19/21  0124 11/18/21  0143 11/17/21  0145 11/16/21  0232 11/15/21  0216 11/14/21  0424 11/13/21  0445 11/12/21  1520   K 5.7* 5.4 4.8 4.3 4.7 4.6 4.8 4.4 4.7 3.5      > On 11/24/2021, patient was given Sodium zirconium cyclosilicate 10 grams PO x 1 dose      11/29/21  1008 11/26/21  0635   K 6.2* 5.1      > Sodium zirconium cyclosilicate 10 grams PO x 1 dose today    > Hyperphosphatemia      11/26/21  0635 11/24/21  0539 11/22/21  0527 11/14/21  0424   PHOS 7.2* 8.1* 8.1* 6.0*      > On 11/26/2021, started Calcium acetate 667 mg PO TID with meals   > P (11/29/2021) = 8.4   > Increase Calcium acetate from 667 mg to 1,334 mg PO TID with meals --> change to Sevelamer 1600 mg PO TID with meals    > 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   > On 11/28/2021, started Lidocaine-Prilocaine 2.5-2.5% cream, applied to palmar surface of ring finger of left hand BID   > Continue:    > Duloxetine 20 mg PO once daily    > Gabapentin 200 mg PO q Mon-Wed-Fri    > Lidocaine-Prilocaine 2.5-2.5% cream, applied to palmar surface of ring finger of left hand BID    > Analgesia / Chronic low back pain   > Continue:    > Acetaminophen 650 mg PO TID (8AM, 12PM, 4PM)    > Acetaminophen 650 mg PO q 4 hr PRN for pain level 4/10 or lesser (from 8PM to 4AM only)    > Lidocaine 4% patch, 1 patch on affected area q 24 hr (12 hr on, 12 hr off)    > Hydromorphone 1 mg PO q 8 hr PRN for pain level 5/10 or greater     > Diet:   > Specifications: 3 carb choices (45 gm/meal); Low fat/Low cholesterol/High fiber/IRINEO; Low potassium (less than 3,000 mg/day); Low phosphorus (less than 1,000 mg/day)   > Solids (consistency): Regular   > Liquids (consistency): Thin   > Fluid restriction: None       9.  Personal Protective Equipment (N95 face mask and eye goggles) was used while interacting with the patient. Patient was using a surgical mask. 10. Patient's progress in rehabilitation and medical issues discussed with the patient. All questions answered to the best of my ability. Care plan discussed with patient, Dr. Yesiac Bhatti and nurse. 11. Total clinical care time is 30 minutes, including review of chart including all labs, radiology, past medical history, and discussion with patient. Greater than 50% of my time was spent in coordination of care and counseling.       SignedDanatali Strong MD    November 29, 2021

## 2021-11-30 PROCEDURE — 2709999900 HC NON-CHARGEABLE SUPPLY

## 2021-11-30 PROCEDURE — 97530 THERAPEUTIC ACTIVITIES: CPT

## 2021-11-30 PROCEDURE — 97110 THERAPEUTIC EXERCISES: CPT

## 2021-11-30 PROCEDURE — 65310000000 HC RM PRIVATE REHAB

## 2021-11-30 PROCEDURE — 74011000250 HC RX REV CODE- 250: Performed by: INTERNAL MEDICINE

## 2021-11-30 PROCEDURE — 74011250637 HC RX REV CODE- 250/637: Performed by: INTERNAL MEDICINE

## 2021-11-30 PROCEDURE — 74011250637 HC RX REV CODE- 250/637: Performed by: FAMILY MEDICINE

## 2021-11-30 PROCEDURE — 99232 SBSQ HOSP IP/OBS MODERATE 35: CPT | Performed by: INTERNAL MEDICINE

## 2021-11-30 PROCEDURE — 97116 GAIT TRAINING THERAPY: CPT

## 2021-11-30 RX ORDER — FLUDROCORTISONE ACETATE 0.1 MG/1
0.2 TABLET ORAL DAILY
COMMUNITY
End: 2021-12-04

## 2021-11-30 RX ADMIN — SEVELAMER CARBONATE 1600 MG: 800 TABLET, FILM COATED ORAL at 08:20

## 2021-11-30 RX ADMIN — ONDANSETRON 4 MG: 4 TABLET, ORALLY DISINTEGRATING ORAL at 15:07

## 2021-11-30 RX ADMIN — CHOLECALCIFEROL TAB 25 MCG (1000 UNIT) 2000 UNITS: 25 TAB at 08:20

## 2021-11-30 RX ADMIN — SEVELAMER CARBONATE 1600 MG: 800 TABLET, FILM COATED ORAL at 17:34

## 2021-11-30 RX ADMIN — CETIRIZINE HYDROCHLORIDE 10 MG: 10 TABLET, FILM COATED ORAL at 08:20

## 2021-11-30 RX ADMIN — ACETAMINOPHEN 650 MG: 325 TABLET ORAL at 12:08

## 2021-11-30 RX ADMIN — LIDOCAINE AND PRILOCAINE: 25; 25 CREAM TOPICAL at 12:12

## 2021-11-30 RX ADMIN — APIXABAN 5 MG: 5 TABLET, FILM COATED ORAL at 17:34

## 2021-11-30 RX ADMIN — ONDANSETRON 4 MG: 4 TABLET, ORALLY DISINTEGRATING ORAL at 08:50

## 2021-11-30 RX ADMIN — HYDROMORPHONE HYDROCHLORIDE 1 MG: 2 TABLET ORAL at 23:09

## 2021-11-30 RX ADMIN — CHOLECALCIFEROL TAB 25 MCG (1000 UNIT) 2000 UNITS: 25 TAB at 17:34

## 2021-11-30 RX ADMIN — AMIODARONE HYDROCHLORIDE 200 MG: 200 TABLET ORAL at 08:20

## 2021-11-30 RX ADMIN — MIDODRINE HYDROCHLORIDE 5 MG: 5 TABLET ORAL at 08:20

## 2021-11-30 RX ADMIN — DULOXETINE HYDROCHLORIDE 20 MG: 20 CAPSULE, DELAYED RELEASE ORAL at 08:20

## 2021-11-30 RX ADMIN — LIDOCAINE AND PRILOCAINE: 25; 25 CREAM TOPICAL at 08:51

## 2021-11-30 RX ADMIN — CYANOCOBALAMIN TAB 1000 MCG 1000 MCG: 1000 TAB at 08:20

## 2021-11-30 RX ADMIN — Medication 1 TABLET: at 08:20

## 2021-11-30 RX ADMIN — ONDANSETRON 4 MG: 4 TABLET, ORALLY DISINTEGRATING ORAL at 23:12

## 2021-11-30 RX ADMIN — LEVOTHYROXINE SODIUM 125 MCG: 125 TABLET ORAL at 06:45

## 2021-11-30 RX ADMIN — MIDODRINE HYDROCHLORIDE 5 MG: 5 TABLET ORAL at 12:08

## 2021-11-30 RX ADMIN — HYDROMORPHONE HYDROCHLORIDE 1 MG: 2 TABLET ORAL at 00:38

## 2021-11-30 RX ADMIN — LATANOPROST 1 DROP: 50 SOLUTION OPHTHALMIC at 21:01

## 2021-11-30 RX ADMIN — Medication 1 CAPSULE: at 08:20

## 2021-11-30 RX ADMIN — SEVELAMER CARBONATE 1600 MG: 800 TABLET, FILM COATED ORAL at 12:09

## 2021-11-30 RX ADMIN — Medication 500 MG: at 08:20

## 2021-11-30 RX ADMIN — ACETAMINOPHEN 650 MG: 325 TABLET ORAL at 16:32

## 2021-11-30 RX ADMIN — APIXABAN 5 MG: 5 TABLET, FILM COATED ORAL at 08:20

## 2021-11-30 RX ADMIN — ACETAMINOPHEN 650 MG: 325 TABLET ORAL at 08:20

## 2021-11-30 RX ADMIN — PANTOPRAZOLE SODIUM 20 MG: 20 TABLET, DELAYED RELEASE ORAL at 06:45

## 2021-11-30 RX ADMIN — MIDODRINE HYDROCHLORIDE 5 MG: 5 TABLET ORAL at 17:34

## 2021-11-30 NOTE — ROUTINE PROCESS
[facility]    Bedside and Verbal shift change report given to Demario Schmidt (oncoming nurse) by Paige Grider RN (offgoing nurse). Report included the following information SBAR, Kardex, MAR and Recent Results.     Situation:   Code Status: DNR   Hospital Day: 11   Problem List:   Hospital Problems  Date Reviewed: 11/29/2021          Codes Class Noted POA    Hyperkalemia ICD-10-CM: E87.5  ICD-9-CM: 276.7  11/29/2021 No        Mononeuropathy (Chronic) ICD-10-CM: G58.9  ICD-9-CM: 355.9  Unknown Yes    Overview Signed 11/28/2021 11:29 AM by Laisha Foster MD     Involving ring finger of left hand             * (Principal) Multiple closed pelvic fractures without disruption of pelvic ring ([facility] [address] ICD-10-CM: A51.24LX  ICD-9-CM: 808.44  11/19/2021 Yes        Hyperphosphatemia ICD-10-CM: E83.39  ICD-9-CM: 275.3  11/14/2021 Yes        Anticoagulated by anticoagulation treatment ICD-10-CM: Z79.01  ICD-9-CM: V58.61  Unknown Yes    Overview Signed 11/23/2021  9:49 AM by Laisha Foster MD     On Apixaban             Paroxysmal atrial fibrillation (HCC) (Chronic) ICD-10-CM: I48.0  ICD-9-CM: 427.31  Unknown Yes        Closed fracture of left inferior pubic ramus, with routine healing, subsequent encounter ICD-10-CM: S32.592D  ICD-9-CM: V54.13  11/12/2021 Yes        Closed nondisplaced fracture of anterior wall of left acetabulum with routine healing ICD-10-CM: S32.415D  ICD-9-CM: V54.19  11/12/2021 Yes        Closed fracture of superior pubic ramus, left, with routine healing, subsequent encounter ICD-10-CM: S32.512D  ICD-9-CM: V54.19  11/12/2021 Yes        History of infection with vancomycin resistant Enterococcus (VRE) ICD-10-CM: H26.44  ICD-9-CM: V12.09  10/8/2021 Yes    Overview Signed 11/23/2021  9:51 AM by Laisha Foster MD     Urine culture (collected 10/8/2021, resulted 10/14/2021) yielded growth of >100,000 colonies/ml of Enterococcus faecalis RESISTANT to Ciprofloxacin, Levofloxacin, Tetracycline and Vancomycin             History of urinary tract infection ICD-10-CM: Z87.440  ICD-9-CM: V13.02  10/8/2021 Yes    Overview Signed 11/23/2021  9:52 AM by Rachelle Kinney MD     Urine culture (collected 10/8/2021, resulted 10/14/2021) yielded growth of >100,000 colonies/ml of Enterococcus faecalis RESISTANT to Ciprofloxacin, Levofloxacin, Tetracycline and Vancomycin             Need for prophylactic isolation ICD-10-CM: Z41.8  ICD-9-CM: V07.0  10/8/2021 Yes    Overview Signed 11/23/2021  9:52 AM by Rachelle Kinney MD     Urine culture (collected 10/8/2021, resulted 10/14/2021) yielded growth of >100,000 colonies/ml of Enterococcus faecalis RESISTANT to Ciprofloxacin, Levofloxacin, Tetracycline and Vancomycin             End-stage renal disease on hemodialysis (HCC) (Chronic) ICD-10-CM: N18.6, Z99.2  ICD-9-CM: 585.6, V45.11  Unknown Yes    Overview Signed 11/23/2021  9:56 AM by Rachelle Kinney MD     HD at [facility] on [address] [facility] on [address].  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 ([facility] [address] (Chronic) ICD-10-CM: E11.22, N18.6  ICD-9-CM: 250.40, 585.6  Unknown Yes    Overview Signed 11/23/2021  9:50 AM by Rachelle Kinney MD     HbA1c (10/8/2021) = 4.6             Chronic hypotension (Chronic) ICD-10-CM: I95.89  ICD-9-CM: 458.1  Unknown Yes    Overview Signed 11/23/2021 10:01 AM by Rachelle Kinney MD     On Midodrine                   Background:   Past Medical History:   Past Medical History:   Diagnosis Date    [facility]     Anticoagulated by anticoagulation treatment     On Apixaban    Chronic hypotension     On Midodrine    Chronic pain     Closed fracture of left inferior pubic ramus, with routine healing, subsequent encounter 11/12/2021    Closed fracture of superior pubic ramus, left, with routine healing, subsequent encounter 11/12/2021    Closed nondisplaced fracture of anterior wall of left acetabulum with routine healing 11/12/2021    Depression     End-stage renal disease on hemodialysis (HCC)     HD at [facility] on [facility] on MWF.  Tel # [phone]    Gastroesophageal reflux disease     Glaucoma     History of acute pyelonephritis 2/20/2020    History of hydronephrosis 10/5/2021    History of infection with vancomycin resistant Enterococcus (VRE) 10/8/2021    Urine culture (collected 10/8/2021, resulted 10/14/2021) yielded growth of >100,000 colonies/ml of Enterococcus faecalis RESISTANT to Ciprofloxacin, Levofloxacin, Tetracycline and Vancomycin    History of kidney stones     History of recurrent urinary tract infection     History of sepsis 6/18/2021    History of septic shock 10/8/2021    History of urethral stricture     History of urinary tract infection 10/8/2021    Urine culture (collected 10/8/2021, resulted 10/14/2021) yielded growth of >100,000 colonies/ml of Enterococcus faecalis RESISTANT to Ciprofloxacin, Levofloxacin, Tetracycline and Vancomycin    Hyperlipidemia     Hyperphosphatemia 11/14/2021    Hypothyroidism     Lung mass     Mononeuropathy     Involving ring finger of left hand    Need for prophylactic isolation 10/8/2021    Urine culture (collected 10/8/2021, resulted 10/14/2021) yielded growth of >100,000 colonies/ml of Enterococcus faecalis RESISTANT to Ciprofloxacin, Levofloxacin, Tetracycline and Vancomycin    Paroxysmal atrial fibrillation (HCC)     Secondary hyperparathyroidism of renal origin ([facility] [address]     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 ([facility] [address]     HbA1c (10/8/2021) = 4.6    Uric acid nephrolithiasis     Urinary incontinence         Assessment:   Changes in Assessment throughout shift: No change to previous assessment     Patient has a central line: no Reasons if yes: n/a  Insertion date:n/a Last dressing date:n/a   Patient has Chua Cath: no Reasons if yes: n/a   Insertion date:n/a  Shift chua care completed: N/A     Last Vitals:     Vitals:    11/29/21 7158 11/29/21 1730 11/29/21 1742 11/29/21 2102   BP: (!) 94/50 99/69 (!) 100/48 (!) 97/49   Pulse: 86 67 87 76   Resp:   18 20   Temp:   98.1 °F (36.7 °C) 97.7 °F (36.5 °C)   TempSrc:   Oral    SpO2:    95%   Height:            PAIN    Pain Assessment    Pain Intensity 1: 0 (11/30/21 0408) Pain Intensity 1: 2 (12/29/14 1105)    Pain Location 1: Groin, Leg Pain Location 1: Abdomen    Pain Intervention(s) 1: Medication (see MAR) Pain Intervention(s) 1: Medication (see MAR)  Patient Stated Pain Goal: Unable to verbalize/indicate pain (asleep) Patient Stated Pain Goal: 0  o Intervention effective: yes  o Other actions taken for pain: Medication (see MAR)     Skin Assessment  Skin color Skin Color: Appropriate for ethnicity  Condition/Temperature Skin Condition/Temp: Dry, Warm  Integrity Skin Integrity: Scars (comment)  Turgor Turgor: Non-tenting  Weekly Pressure Ulcer Documentation  Pressure  Injury Documentation: No Pressure Injury Noted-Pressure Ulcer Prevention Initiated  Wound Prevention & Protection Methods  Orientation of wound Orientation of Wound Prevention: Posterior  Location of Prevention Location of Wound Prevention: Buttocks, Sacrum/Coccyx  Dressing Present Dressing Present : No  Dressing Status    Wound Offloading Wound Offloading (Prevention Methods): Bed, pressure reduction mattress     INTAKE/OUPUT  Date 11/29/21 0700 - 11/30/21 0659 11/30/21 0700 - 12/01/21 0659   Shift 3321-6537 1368-5336 24 Hour Total 9246-1851 7041-8256 24 Hour Total   INTAKE   P.O. 360 120 480        P. O. 360 120 480      Shift Total 360 120 480      OUTPUT   Urine           Urine Occurrence(s)  0 x 0 x      Stool           Stool Occurrence(s)  0 x 0 x      Dialysis 1000  1000        NET Fluid Removed (mL) 1000  1000      Shift Total 1000  1000      NET -640 120 -520      Weight (kg)             Recommendations:  1. Patient needs and requests: assistance with ADL's    2. Pending tests/procedures: none     3.  Functional Level/Equipment: Partial (one person) / Adwoasaira Mercado; Wheelchair    Fall Precautions:   Fall risk precautions were reinforced with the patient; she was instructed to call for help prior to getting up. The following fall risk precautions were continued: bed/ chair alarms, door signage, yellow bracelet and socks as well as update of the Elane Duff tool in the patient's room. Freddy Score: 3    HEALS Safety Check    A safety check occurred in the patient's room between off going nurse and oncoming nurse listed above. The safety check included the below items  Area Items   H  High Alert Medications - Verify all high alert medication drips (heparin, PCA, etc.)   E  Equipment - Suction is set up for ALL patients (with adelaide)  - Red plugs utilized for all equipment (IV pumps, etc.)  - WOWs wiped down at end of shift.  - Room stocked with oxygen, suction, and other unit-specific supplies   A  Alarms - Bed alarm is set for fall risk patients  - Ensure chair alarm is in place and activated if patient is up in a chair   L  Lines - Check IV for any infiltration  - Cline bag is empty if patient has a Cline   - Tubing and IV bags are labeled   S  Safety   - Room is clean, patient is clean, and equipment is clean. - Hallways are clear from equipment besides carts. - Fall bracelet on for fall risk patients  - Ensure room is clear and free of clutter  - Suction is set up for ALL patients (with adelaide)  - Hallways are clear from equipment besides carts.    - Isolation precautions followed, supplies available outside room, sign posted     Brian Felix RN

## 2021-11-30 NOTE — INTERDISCIPLINARY ROUNDS
[address]  [address] [place] [address], [address]     INPATIENT REHABILITATION  DISCHARGE RECOMMENDATION SHEET    Date: 11/30/2021     Name: Ugo Reza Age / Sex: 64 y.o. / female   CSN: [id number] MRN: [id number]   [facility] Date: 11/19/2021 Discharge Date: 12/7/2021        [facility] WALKING AIDS FOLLOW-UP SERVICES      Height  []  Straight cane  [] DMV Handicap Placard    []  #14 ½ bunny height  []  Forearm crutches OUTPATIENT    []  Bunny height  []  Axillary crutches  []  PT    []  Standard height  []  LBQC  []  OT    []  Reclining high back  []  SBQC  []  ST       Weight  HOME HEALTH    []  Standard weight WALKERS  [x]  PT    []  Lightweight  []  Standard height  [x]  OT    []  High-strength lightweight  []  Small adult  []  ST    []  Heavy Duty   []  Tall walker  [x]  Nursing    []  Patient is unable to self-propel a standard weight wheelchair  [x]  Rolling walker with 5 single fixed wheels  []  Wound care  Location of wound:  Specify needs:      []  Anticoagulation management       Width  []  Bariatric walker  []  Diabetes management    []  Narrow (16)   []  Insulin management    []  Standard (18) ACCESSORIES  []  No insulin management    []  Wide (20)  []  Rear sure glide brakes  [x]  BP management    []  Other  []  10 rear wheel brake  []  CHF Telehealth       Arms  []  Tall leg extensions  []  Post-CABG care/monitoring    []  Standard  []  Other:  []  Colostomy care    []  Desk/Tray   []  Tube feeding    []  Removable BATHROOM EQUIPMENT  []  PICC line care      Foot/Leg Rests  [x]  Bedside commode  []  Cline catheter care    []  Removable  []  Extra wide bedside commode  [x]  Other: Dialysis    []  Elevating  []  3:1 commode WITH drop arms  []  Medical Social Worker      Other  []  Tub transfer bench  []  Aide    []  Brake extensions  []  Shower chair     []  Padded gloves       []  Antitippers           CUSHIONS OTHER     []  Foam cushion  []  Seat belt     []  Gel cushion  []  Gait belt     []  Aneita Weir II  []  Transfer board - Size:     []  Roho  []  Oxygen     []  Other  []  CPM      []  [address]  []  Ramp     []  Hospital bed  []  Hip kit     []  Special mattress  []  Reacher     []  Trapeze bar       Preferred Local Pharmacy (not mail-order):  [facility] on [address] PT, DPT   Occupational Therapy Rosmery Gunter, MSOTR/L   Speech-Language Therapy    Social Work Yamileth Calixto, MSW   Nursing Lilian Roca, RN

## 2021-11-30 NOTE — PROGRESS NOTES
[facility] PHYSICAL REHABILITATION  [address] [place] [address], [address]     INPATIENT REHABILITATION  DAILY PROGRESS NOTE     Date: 11/30/2021    Name: Lennart Nageotte Age / Sex: 64 y.o. / female   CSN: [id number] MRN: [id number]   [facility] Date: 11/19/2021 Length of Stay: 11 days     Primary Rehab Diagnosis: Impaired Mobility and ADLs secondary to Multiple closed pelvic fractures (comminuted fracture involving the left anterior acetabular wall with involvement of the base of the left superior pubic ramus which are not significantly displaced; nondisplaced left inferior pubic ramus fracture)      Subjective:     Patient seen and examined. Blood pressure controlled.  Team conference was held at bedside this PM.     Patient's Complaint:   No significant medical complaints    Pain Control: stable, mild-to-moderate joint symptoms intermittently, reasonably well controlled by current meds      Objective:     Vital Signs:  Patient Vitals for the past 24 hrs:   BP Temp Temp src Pulse Resp SpO2 Weight   11/30/21 1207 (!) 124/58 -- -- 76 -- -- --   11/30/21 1038 -- -- -- -- -- -- 95 kg (209 lb 8 oz)   11/30/21 0724 (!) 114/54 98.2 °F (36.8 °C) -- 77 18 96 % --   11/29/21 2102 (!) 97/49 97.7 °F (36.5 °C) -- 76 20 95 % --   11/29/21 1742 (!) 100/48 98.1 °F (36.7 °C) Oral 87 18 -- --   11/29/21 1730 99/69 -- -- 67 -- -- --   11/29/21 1715 (!) 94/50 -- -- 86 -- -- --   11/29/21 1700 (!) 93/41 -- -- 83 -- -- --   11/29/21 1645 (!) 93/51 -- -- 85 -- -- --   11/29/21 1630 (!) 104/49 -- -- 86 -- -- --   11/29/21 1615 (!) 73/55 -- -- (!) 43 -- -- --   11/29/21 1600 (!) 97/49 -- -- 68 -- -- --   11/29/21 1545 (!) 102/53 -- -- 70 -- -- --   11/29/21 1530 (!) 108/52 -- -- 67 -- -- --   11/29/21 1515 (!) 91/52 -- -- 71 -- -- --   11/29/21 1500 (!) 93/44 -- -- 69 -- -- --   11/29/21 1445 (!) 98/47 -- -- 71 -- -- --   11/29/21 1430 (!) 94/40 -- -- (!) 50 -- -- --   11/29/21 1415 (!) 102/49 -- -- (!) 50 -- -- --   11/29/21 1412 (!) 105/46 -- -- (!) 54 -- -- --   11/29/21 1408 (!) 101/53 98 °F (36.7 °C) Oral (!) 57 18 -- --        Physical Examination:  GENERAL SURVEY: Patient is awake, alert, oriented x 3, sitting comfortably on the chair, not in acute respiratory distress. HEENT: pink palpebral conjunctivae, anicteric sclerae, no nasoaural discharge, moist oral mucosa  NECK: supple, no jugular venous distention, no palpable lymph nodes  CHEST/LUNGS: symmetrical chest expansion, good air entry, clear breath sounds  HEART: adynamic precordium, good S1 S2, no S3, regular rhythm, no murmurs  ABDOMEN: obese, bowel sounds appreciated, soft, non-tender  EXTREMITIES: pink nailbeds, no edema, full and equal pulses, no calf tenderness   NEUROLOGICAL EXAM: The patient is awake, alert and oriented x3, able to answer questions fairly appropriately, able to follow 1 and 2 step commands. Able to tell time from the wall clock. Cranial nerves II-XII are grossly intact. No gross sensory deficit. Motor strength is 4 to 4+/5 on BUE and RLE, 2+ to 3-/5 on the left hip, 3 to 3+/5 on the left knee and left ankle.        Current Medications:  Current Facility-Administered Medications   Medication Dose Route Frequency    sevelamer carbonate (RENVELA) tab 1,600 mg  1,600 mg Oral TID WITH MEALS    lidocaine-prilocaine (EMLA) 2.5-2.5 % cream   Topical BID    midodrine (PROAMATINE) tablet 5 mg  5 mg Oral TID WITH MEALS    allopurinoL (ZYLOPRIM) tablet 100 mg  100 mg Oral Q MON, WED & FRI    epoetin caitlin-epbx (RETACRIT) injection 8,000 Units  8,000 Units SubCUTAneous Q MON, WED & FRI    cetirizine (ZYRTEC) tablet 10 mg  10 mg Oral DAILY    acetaminophen (TYLENOL) tablet 650 mg  650 mg Oral Q4H PRN    bisacodyL (DULCOLAX) tablet 10 mg  10 mg Oral Q48H PRN    amiodarone (CORDARONE) tablet 200 mg  200 mg Oral DAILY    acetaminophen (TYLENOL) tablet 650 mg  650 mg Oral TID    apixaban (ELIQUIS) tablet 5 mg  5 mg Oral BID    HYDROmorphone (DILAUDID) tablet 1 mg 1 mg Oral Q8H PRN    meclizine (ANTIVERT) tablet 12.5 mg  12.5 mg Oral TID PRN    DULoxetine (CYMBALTA) capsule 20 mg  20 mg Oral DAILY    vitamin B complex-folic acid 0.4 mg tablet  1 Tablet Oral DAILY    cyanocobalamin tablet 1,000 mcg  1,000 mcg Oral DAILY    L. acidophilus,casei,rhamnosus (BIO-K PLUS) capsule 1 Capsule  1 Capsule Oral DAILY    ascorbic acid (vitamin C) (VITAMIN C) tablet 500 mg  500 mg Oral DAILY    cholecalciferol (VITAMIN D3) (1000 Units /25 mcg) tablet 2,000 Units  2,000 Units Oral BID    latanoprost (XALATAN) 0.005 % ophthalmic solution 1 Drop  1 Drop Both Eyes QPM    levothyroxine (SYNTHROID) tablet 125 mcg  125 mcg Oral 6am    pantoprazole (PROTONIX) tablet 20 mg  20 mg Oral ACB    ondansetron (ZOFRAN ODT) tablet 4 mg  4 mg Oral TID PRN    lidocaine 4 % patch 1 Patch  1 Patch TransDERmal Q24H    gabapentin (NEURONTIN) capsule 200 mg  200 mg Oral Q MON, WED & FRI    doxercalciferoL (HECTOROL) 4 mcg/2 mL injection 4 mcg  4 mcg IntraVENous DIALYSIS MON, WED & FRI       Allergies:   Allergies   Allergen Reactions    Albumin, Human 25 % Itching     Headache - severe migraine like, itchy eyes, runny nose    Ciprofloxacin Hives    Cyclopentolate Unknown (comments)    Iron Sucrose Diarrhea    Statins-Hmg-Coa Reductase Inhibitors Other (comments)     Body ache       Functional Progress:    PHYSICAL THERAPY    ON ADMISSION MOST RECENT   Wheelchair Mobility/Management  Able to Propel (ft): 230 feet  Functional Level: 4  Curbs/Ramps Assist Required (FIM Score): 0 (Not tested)  Wheelchair Setup Assist Required : 2 (Maximal assistance)  Wheelchair Management: Manages left brake, Manages right brake (assistance with armrests, footrests) Wheelchair Mobility/Management  Able to Propel (ft): 232 feet  Functional Level: 6  Curbs/Ramps Assist Required (FIM Score): 0 (Not tested)  Wheelchair Setup Assist Required : 5 (Supervision/setup)  Wheelchair Management: Manages left brake, Manages right brake     Gait  Amount of Assistance: 0 (Not tested)  Distance (ft): 0 Feet (ft)  Assistive Device:  (NT) Gait  Amount of Assistance: 5 (Supervision/setup)  Distance (ft): 236 Feet (ft)  Assistive Device: Gait belt, Walker, rolling     Balance-Sitting/Standing  Sitting - Static: Good (unsupported)  Sitting - Dynamic: Good (unsupported)  Standing - Static: Fair, Occasional, Poor (using bilat UE support of RW, occasionally one UE support)  Standing - Dynamic : Impaired Balance-Sitting/Standing  Sitting - Static: Good (unsupported)  Sitting - Dynamic: Good (unsupported)  Standing - Static: Fair  Standing - Dynamic : Impaired     Bed/Mat Mobility  Rolling Right : 4 (Minimal assistance)  Rolling Left : 4 (Minimal assistance)  Supine to Sit : 3 (Moderate assistance) (flat head of bed, no railings, needing minimal trunk support)  Sit to Supine : 3 (Moderate assistance) (head of bed flat, no rails, assist w/ left LE and reposition) Bed/Mat Mobility  Rolling Right : 5 (Supervision)  Rolling Left : 5 (Supervision)  Supine to Sit : 5 (Supervision)  Sit to Supine : 5 (Supervision)     Transfers  Transfer Type: SPT with walker (mod/max A due to loss of balance and needing recovery)  Other: lateral transfer with transfer board (needing mod/max A)  Transfer Assistance :  (mod/max A for stabilization and for maintaining TTWB left LE)  Sit to Stand Assistance: Moderate assistance (lifting assistance, support left LE to ensure TTWB)  Car Transfers: Not tested  Car Type: NT Transfers  Transfer Type:  Other  Other: stand step with RW  Transfer Assistance : 5 (Supervision/setup)  Sit to Stand Assistance: Modified independent  Car Transfers:  (CGA)  Car Type: Car Transfer Trainer     Steps or Stairs  Steps/Stairs Ambulated (#): 0  Level of Assist : 0 (Not tested)  Rail Use:  (NT) Steps or Stairs  Steps/Stairs Ambulated (#): 4 (6\")  Level of Assist : 4 (Contact guard assistance)  Rail Use: Both         Lab/Data Review:  No results found for this or any previous visit (from the past 24 hour(s)). Assessment:     Primary Rehabilitation Diagnosis  1. Impaired Mobility and ADLs  2. 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 ([facility]) E11.22, N18.6    Secondary hyperparathyroidism of renal origin ([facility] [address] N25.81    Gastroesophageal reflux disease K21.9    History of recurrent urinary tract infection Z87.440    History of sepsis Z86.19    End-stage renal disease on hemodialysis ([facility]) N18.6, Z99.2    History of hydronephrosis Z87.448    History of septic shock Z86.19    Anticoagulated by anticoagulation treatment Z79.01    Paroxysmal atrial fibrillation ([facility]) I48.0    Closed fracture of left inferior pubic ramus, with routine healing, subsequent encounter S32.592D    Closed nondisplaced fracture of anterior wall of left acetabulum with routine healing S32.415D    Closed fracture of superior pubic ramus, left, with routine healing, subsequent encounter S32.512D    Multiple closed pelvic fractures without disruption of pelvic ring ([facility] [address] S32.82XA    Depression F32. A    History of urinary tract infection Z87.440    Need for prophylactic isolation Z41.8    Hyperlipidemia E78.5    Hypothyroidism E03.9    History of kidney stones Z87.442    Chronic pain G89.29    Lung mass R91.8    History of urethral stricture Z87.448    Uric acid nephrolithiasis N20.0    Urinary incontinence R32    Glaucoma H40.9    Hyperphosphatemia E83.39    Mononeuropathy G58.9    Hyperkalemia E87.5       Plan:     1. Justification for continued stay: Good progression towards established rehabilitation goals. 2. Medical Issues being followed closely:    [x]  Fall and safety precautions     []  Wound Care     [x]  Bowel and Bladder Function     [x]  Fluid Electrolyte and Nutrition Balance     [x]  Pain Control      3. Issues that 24 hour rehabilitation nursing is following:    [x]  Fall and safety precautions     []  Wound Care     [x]  Bowel and Bladder Function     [x]  Fluid Electrolyte and Nutrition Balance     [x]  Pain Control      [x]  Assistance with and education on in-room safety with transfers to and from the bed, wheelchair, toilet and shower. 4. Acute rehabilitation plan of care:    [x]  Continue current care and rehab. [x]  Physical Therapy           [x]  Occupational Therapy           []  Speech Therapy     []  Hold Rehab until further notice     5. Medications:    [x]  MAR Reviewed     [x]  Continue Present Medications     6. DVT Prophylaxis:      []  Enoxaparin     []  Unfractionated Heparin     []  Warfarin     [x]  NOAC     []  AMELIE Stockings     []  Sequential Compression Device     []  None     7. Code status    []  Full code     []  Partial code     []  Do not intubate     [x]  Do not resuscitate     8.  Orders:   > Multiple closed pelvic fractures (comminuted fracture involving the left anterior acetabular wall with involvement of the base of the left superior pubic ramus which are not significantly displaced; nondisplaced left inferior pubic ramus fracture)   > Orthopedic surgery recommending non-surgical management with PT, pain control   > Partial (50%) weightbearing on the left lower extremity     > Urinary tract infection, History of right ureteral stent   > Urine culture (collected 11/16/2021, resulted 11/20/2021) yielded growth of >100,000 colonies/ml of Proteus mirabilis RESISTANT to Nitrofurantoin   > Started on IV ceftriaxone and transitioned to cefpodoxime   > On discharge, patient is to follow up with Urology (Dr. Danuta Ireland)   > On 11/25/2021, patient had completed the recommended treatment course of Cefpodoxime 200 mg PO q 24 hr     > History of VRE urinary tract infection, 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)   > Urine culture (collected 10/8/2021, resulted 10/14/2021) yielded growth of >100,000 colonies/ml of Enterococcus faecalis RESISTANT to Ciprofloxacin, Levofloxacin, Tetracycline and Vancomycin   > Contact isolation    > Paroxysmal atrial fibrillation, anticoagulated on Apixaban   > Anticoagulation    > Continue Apixaban 5 mg PO BID    > Rate-control    > None   > Maintenance of sinus rhythm    > Continue Amiodarone 200 mg PO once daily    > Glaucoma    > Continue Latanoprost 0.005% ophthalmic solution, 1 drop into each ete q PM    > Hypothyroidism   > TSH (11/13/2021) = 11.4   > TSH (11/22/2021) = 1.69   > Free T4 (11/22/2021) = 1.1   > Continue Levothyroxine 125 mcg PO once daily     > Chronic hypotension   > On 11/23/2021, decreased Midodrine from 10 mg to 5 mg PO TID with meals   > Continue Midodrine 5 mg PO TID with meals     > End-stage renal disease, on hemodialysis (Mon-Wed-Fri); Secondary hyperparathyroidism of renal origin   > Nephrology consult (Dr. 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 Allergy to albumin   > Will list as allergy per pt and daughter-in-law request.  Start antihistamine. Monitor.    > Hyperkalemia      11/24/21  0539 11/22/21  0527 11/19/21  0124 11/18/21  0143 11/17/21  0145 11/16/21  0232 11/15/21  0216 11/14/21  0424 11/13/21  0445 11/12/21  1520   K 5.7* 5.4 4.8 4.3 4.7 4.6 4.8 4.4 4.7 3.5      > On 11/24/2021, patient was given Sodium zirconium cyclosilicate 10 grams PO x 1 dose      11/29/21  1008 11/26/21  0635   K 6.2* 5.1      > On 11/29/2021, patient was given Sodium zirconium cyclosilicate 10 grams PO x 1 dose     > Hyperphosphatemia      11/26/21  0635 11/24/21  0539 11/22/21  0527 11/14/21  0424   PHOS 7.2* 8.1* 8.1* 6.0*      > On 11/26/2021, started Calcium acetate 667 mg PO TID with meals   > P (11/29/2021) = 8.4   > On 11/29/2021, increased Calcium acetate from 667 mg to 1,334 mg PO TID with meals --> changed to Sevelamer 1600 mg PO TID with meals   > Continue Sevelamer 1600 mg PO TID with meals    > 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   > On 11/28/2021, started Lidocaine-Prilocaine 2.5-2.5% cream, applied to palmar surface of ring finger of left hand BID   > Continue:    > Duloxetine 20 mg PO once daily    > Gabapentin 200 mg PO q Mon-Wed-Fri    > Lidocaine-Prilocaine 2.5-2.5% cream, applied to palmar surface of ring finger of left hand BID    > Analgesia / Chronic low back pain   > Continue:    > Acetaminophen 650 mg PO TID (8AM, 12PM, 4PM)    > Acetaminophen 650 mg PO q 4 hr PRN for pain level 4/10 or lesser (from 8PM to 4AM only)    > Lidocaine 4% patch, 1 patch on affected area q 24 hr (12 hr on, 12 hr off)    > Hydromorphone 1 mg PO q 8 hr PRN for pain level 5/10 or greater     > Diet:   > Specifications: 3 carb choices (45 gm/meal); Low fat/Low cholesterol/High fiber/IRINEO; Low potassium (less than 3,000 mg/day); Low phosphorus (less than 1,000 mg/day)   > Solids (consistency): Regular   > Liquids (consistency): Thin   > Fluid restriction: None       9.  Personal Protective Equipment (N95 face mask and eye goggles) was used while interacting with the patient. Patient was using a surgical mask. 10. Patient's progress in rehabilitation and medical issues discussed with the patient. All questions answered to the best of my ability. Care plan discussed with patient and nurse. 11. Total clinical care time is 30 minutes, including review of chart including all labs, radiology, past medical history, and discussion with patient. Greater than 50% of my time was spent in coordination of care and counseling.       Signed:    Katie Ramirez MD    November 30, 2021

## 2021-11-30 NOTE — ROUTINE PROCESS
SHIFT CHANGE NOTE FOR [facility]    Bedside and Verbal shift change report given to VioletaRN (oncoming nurse) by Haley Barrera RN (offgoing nurse). Report included the following information SBAR, Kardex, MAR and Recent Results.     Situation:   Code Status: DNR   Hospital Day: 11   Problem List:   Hospital Problems  Date Reviewed: 11/30/2021          Codes Class Noted POA    Hyperkalemia ICD-10-CM: E87.5  ICD-9-CM: 276.7  11/29/2021 No        Mononeuropathy (Chronic) ICD-10-CM: G58.9  ICD-9-CM: 355.9  Unknown Yes    Overview Signed 11/28/2021 11:29 AM by Vivian Conrad MD     Involving ring finger of left hand             * (Principal) Multiple closed pelvic fractures without disruption of pelvic ring ([facility] [address] ICD-10-CM: A42.71QC  ICD-9-CM: 808.44  11/19/2021 Yes        Hyperphosphatemia ICD-10-CM: E83.39  ICD-9-CM: 275.3  11/14/2021 Yes        Anticoagulated by anticoagulation treatment ICD-10-CM: Z79.01  ICD-9-CM: V58.61  Unknown Yes    Overview Signed 11/23/2021  9:49 AM by Vivian Conrad MD     On Apixaban             Paroxysmal atrial fibrillation (HCC) (Chronic) ICD-10-CM: I48.0  ICD-9-CM: 427.31  Unknown Yes        Closed fracture of left inferior pubic ramus, with routine healing, subsequent encounter ICD-10-CM: S32.592D  ICD-9-CM: V54.13  11/12/2021 Yes        Closed nondisplaced fracture of anterior wall of left acetabulum with routine healing ICD-10-CM: S32.415D  ICD-9-CM: V54.19  11/12/2021 Yes        Closed fracture of superior pubic ramus, left, with routine healing, subsequent encounter ICD-10-CM: S32.512D  ICD-9-CM: V54.19  11/12/2021 Yes        History of infection with vancomycin resistant Enterococcus (VRE) ICD-10-CM: I82.81  ICD-9-CM: V12.09  10/8/2021 Yes    Overview Signed 11/23/2021  9:51 AM by Vivian Conrad MD     Urine culture (collected 10/8/2021, resulted 10/14/2021) yielded growth of >100,000 colonies/ml of Enterococcus faecalis RESISTANT to Ciprofloxacin, Levofloxacin, Tetracycline and Vancomycin             History of urinary tract infection ICD-10-CM: Z87.440  ICD-9-CM: V13.02  10/8/2021 Yes    Overview Signed 11/23/2021  9:52 AM by Ten Lentz MD     Urine culture (collected 10/8/2021, resulted 10/14/2021) yielded growth of >100,000 colonies/ml of Enterococcus faecalis RESISTANT to Ciprofloxacin, Levofloxacin, Tetracycline and Vancomycin             Need for prophylactic isolation ICD-10-CM: Z41.8  ICD-9-CM: V07.0  10/8/2021 Yes    Overview Signed 11/23/2021  9:52 AM by Ten Lentz MD     Urine culture (collected 10/8/2021, resulted 10/14/2021) yielded growth of >100,000 colonies/ml of Enterococcus faecalis RESISTANT to Ciprofloxacin, Levofloxacin, Tetracycline and Vancomycin             End-stage renal disease on hemodialysis (HCC) (Chronic) ICD-10-CM: N18.6, Z99.2  ICD-9-CM: 585.6, V45.11  Unknown Yes    Overview Signed 11/23/2021  9:56 AM by Ten Lentz MD     HD at [facility] on [facility] on [address].  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 ([facility] [address] (Chronic) ICD-10-CM: E11.22, N18.6  ICD-9-CM: 250.40, 585.6  Unknown Yes    Overview Signed 11/23/2021  9:50 AM by Ten Lentz MD     HbA1c (10/8/2021) = 4.6             Chronic hypotension (Chronic) ICD-10-CM: I95.89  ICD-9-CM: 458.1  Unknown Yes    Overview Signed 11/23/2021 10:01 AM by Ten Lentz MD     On Midodrine                   Background:   Past Medical History:   Past Medical History:   Diagnosis Date    Anemia in end-stage renal disease ([facility] [address]     Anticoagulated by anticoagulation treatment     On Apixaban    Chronic hypotension     On Midodrine    Chronic pain     Closed fracture of left inferior pubic ramus, with routine healing, subsequent encounter 11/12/2021    Closed fracture of superior pubic ramus, left, with routine healing, subsequent encounter 11/12/2021    Closed nondisplaced fracture of anterior wall of left acetabulum with routine healing 11/12/2021    Depression     End-stage renal disease on hemodialysis ([facility] [address]     HD at [facility] on [facility] on MWF.  Tel # [phone]    Gastroesophageal reflux disease     Glaucoma     History of acute pyelonephritis 2/20/2020    History of hydronephrosis 10/5/2021    History of infection with vancomycin resistant Enterococcus (VRE) 10/8/2021    Urine culture (collected 10/8/2021, resulted 10/14/2021) yielded growth of >100,000 colonies/ml of Enterococcus faecalis RESISTANT to Ciprofloxacin, Levofloxacin, Tetracycline and Vancomycin    History of kidney stones     History of recurrent urinary tract infection     History of sepsis 6/18/2021    History of septic shock 10/8/2021    History of urethral stricture     History of urinary tract infection 10/8/2021    Urine culture (collected 10/8/2021, resulted 10/14/2021) yielded growth of >100,000 colonies/ml of Enterococcus faecalis RESISTANT to Ciprofloxacin, Levofloxacin, Tetracycline and Vancomycin    Hyperlipidemia     Hyperphosphatemia 11/14/2021    Hypothyroidism     Lung mass     Mononeuropathy     Involving ring finger of left hand    Need for prophylactic isolation 10/8/2021    Urine culture (collected 10/8/2021, resulted 10/14/2021) yielded growth of >100,000 colonies/ml of Enterococcus faecalis RESISTANT to Ciprofloxacin, Levofloxacin, Tetracycline and Vancomycin    Paroxysmal atrial fibrillation (HCC)     Secondary hyperparathyroidism of renal origin ([facility] [address]     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 ([facility] [address]     HbA1c (10/8/2021) = 4.6    Uric acid nephrolithiasis     Urinary incontinence         Assessment:   Changes in Assessment throughout shift: No change to previous assessment     Patient has a central line: no Reasons if yes: n/a  Insertion date:n/a Last dressing date:n/a   Patient has Chua Cath: no Reasons if yes: n/a   Insertion date:n/a  Shift chua care completed: N/A     Last Vitals:     Vitals:    11/30/21 0724 11/30/21 1038 11/30/21 1207 11/30/21 1543   BP: (!) 114/54  (!) 124/58 118/65   Pulse: 77  76 73   Resp: 18   16   Temp: 98.2 °F (36.8 °C)   97 °F (36.1 °C)   TempSrc:       SpO2: 96%   96%   Weight:  95 kg (209 lb 8 oz)     Height:            PAIN    Pain Assessment    Pain Intensity 1: 0 (11/30/21 1631) Pain Intensity 1: 2 (12/29/14 1105)    Pain Location 1: Head Pain Location 1: Abdomen    Pain Intervention(s) 1: Medication (see MAR) Pain Intervention(s) 1: Medication (see MAR)  Patient Stated Pain Goal: 0 Patient Stated Pain Goal: 0  o Intervention effective: yes  o Other actions taken for pain: Medication (see MAR)     Skin Assessment  Skin color Skin Color: Appropriate for ethnicity  Condition/Temperature Skin Condition/Temp: Dry, Warm  Integrity Skin Integrity: Scars (comment)  Turgor Turgor: Non-tenting  Weekly Pressure Ulcer Documentation  Pressure  Injury Documentation: No Pressure Injury Noted-Pressure Ulcer Prevention Initiated  Wound Prevention & Protection Methods  Orientation of wound Orientation of Wound Prevention: Posterior  Location of Prevention Location of Wound Prevention: Buttocks, Sacrum/Coccyx  Dressing Present Dressing Present : No  Dressing Status    Wound Offloading Wound Offloading (Prevention Methods): Bed, pressure redistribution/air     INTAKE/OUPUT  Date 11/29/21 0700 - 11/30/21 0659 11/30/21 0700 - 12/01/21 0659   Shift 8645-4850 0573-5745 24 Hour Total 7749-1844 2368-4444 24 Hour Total   INTAKE   P.O. 360 120 480 600  600     P. O. 360 120 480 600  600   Shift Total(mL/kg) 360 120 480 600(6.3)  600(6.3)   OUTPUT   Urine           Urine Occurrence(s)  0 x 0 x 0 x  0 x   Stool           Stool Occurrence(s)  0 x 0 x 0 x  0 x   Dialysis 1000  1000        NET Fluid Removed (mL) 1000  1000      Shift Total(mL/kg) 1000  1000      NET -640 120 -520 600  600   Weight (kg)    95 95 95       Recommendations:  1. Patient needs and requests: assistance with ADL's    2.  Pending tests/procedures: none 3. Functional Level/Equipment: Partial (one person) / Wheelchair    Fall Precautions:   Fall risk precautions were reinforced with the patient; she was instructed to call for help prior to getting up. The following fall risk precautions were continued: bed/ chair alarms, door signage, yellow bracelet and socks as well as update of the Ragena Glad tool in the patient's room. Freddy Score: 3    HEALS Safety Check    A safety check occurred in the patient's room between off going nurse and oncoming nurse listed above. The safety check included the below items  Area Items   H  High Alert Medications - Verify all high alert medication drips (heparin, PCA, etc.)   E  Equipment - Suction is set up for ALL patients (with adelaide)  - Red plugs utilized for all equipment (IV pumps, etc.)  - WOWs wiped down at end of shift.  - Room stocked with oxygen, suction, and other unit-specific supplies   A  Alarms - Bed alarm is set for fall risk patients  - Ensure chair alarm is in place and activated if patient is up in a chair   L  Lines - Check IV for any infiltration  - Cline bag is empty if patient has a Cline   - Tubing and IV bags are labeled   S  Safety   - Room is clean, patient is clean, and equipment is clean. - Hallways are clear from equipment besides carts. - Fall bracelet on for fall risk patients  - Ensure room is clear and free of clutter  - Suction is set up for ALL patients (with adelaide)  - Hallways are clear from equipment besides carts.    - Isolation precautions followed, supplies available outside room, sign posted     Magda Meek RN

## 2021-11-30 NOTE — PROGRESS NOTES
Problem: Mobility Impaired (Adult and Pediatric)  Goal: *Therapy Goal (Edit Goal, Insert Text)  Description: Physical Therapy Short Term Goals  Initiated 11/20/2021, updated 11/29/2021, and to be accomplished within 7 day(s) on 12/06/2021  1. Patient will move from supine to sit and sit to supine , scoot up and down, and roll side to side in bed with supervision/set-up. MET 11/29/2021   Updated Goal: Patient will move from supine to sit and sit to supine , scoot up and down, and roll side to side in bed with modified independence. 2.  Patient will transfer from bed to chair and chair to bed with moderate assistance  using the least restrictive device, consistently maintaining TTWB left LE. MET with PWB Left LE 11/29/2021   Updated Goal: Patient will transfer from bed to chair and chair to bed with distant supervision using the least restrictive device, consistently maintaining PWB left LE. 3.  Patient will perform sit to stand with minimal to moderate assistance without additional assistance left LE to consistently maintain TTWB. MET with PWB Left LE 11/29/2021   Updated Goal: Patient will perform sit to stand consistently at distant supervision maintaining PWB. 4.  Patient will perform w/c mobility SBA level over even surfaces for at least 200 ft before fatiguing. MET 11/29/2021  5. Patient will be able to perform static standing for at least 2 minute duration with 1-2 UE support before needing seated rest, maintaining TTWB left LE appropriately. MET with PWB Left LE 11/29/2021    Updated Goal:  Patient will negotiate 4 (6\") steps with B UE support on handrails with supervision maintaining left LE PWB. Physical Therapy Long Term Goals  Initiated 11/20/2021 and to be accomplished within 28 day(s) 12/18/2021  1. Patient will move from supine to sit and sit to supine , scoot up and down, and roll side to side in bed with modified independence.     2.  Patient will transfer from bed to chair and chair to bed with modified independence using the least restrictive device. 3.  Patient will perform sit to stand with modified independence. 4.  Patient will ambulate 150 feet with RW maintaining left LE PWB with distant supervision. 5.  Patient will ascend/descend 4 (6\") steps with B UE support on handrails with distant supervision. 5.  Patient will perform w/c mobility mod I over even surfaces for at least 200 ft  6. Patient will negotiate w/c ramp with distant supervision. Outcome: Progressing Towards Goal    PHYSICAL THERAPY TREATMENT    Patient: Tiffanie Francisco [de-identified]64 y.o. female)  Date: 2021  Diagnosis: Multiple closed pelvic fractures without disruption of pelvic ring (HCC) [S32.82XA] Multiple closed pelvic fractures without disruption of pelvic ring (HCC)  Precautions: Contact, Fall, Skin, PWB (50% Left LE)  Chart, physical therapy assessment, plan of care and goals were reviewed. Time ER:3418  Time Out:1000    Patient seen for: Balance activities; Gait training; Patient education; Transfer training     Time In:1331  Time Out:1430    Patient seen for: Balance activities; Gait training; Patient education; Transfer training    Pain:  Pt pain was reported as \"nor as sore\" pre-treatment. Pt pain was reported as \"not as sore\" post-treatment. Intervention: Pt reports she received Tylenol prior to treatment session. Patient identified with name and : yes    SUBJECTIVE:      Pt is pleasant and cooperative throughout treatment session reporting feeling better and asking, \"can I walk down that way? \" self selecting participating in longer gait trials. Pt notes she has a chair lift at home but indicates she has to ascend one step to get up to the level of the chair lift to ascend the stairs and at the top she would also have to ascend one step. PT discussed safety concerns with pt as she reports the rail is on the same side of the chair lift and there is only one rail going up the steps.   PT educated pt re: possible need for first floor set up due to home set up with need to maintain left LE PWB. OBJECTIVE DATA SUMMARY:    Objective:     TRANSFERS Daily Assessment     Transfer Type: Other  Other: stand step with RW  Transfer Assistance : 5 (Supervision/setup)  Pt requires verbal cues for posture and to remain within RW for stand step transfers  Sit to Stand Assistance: Modified independent jto/from RW        GAIT Daily Assessment    Gait Description (WDL) Exceptions to WDL    Gait Abnormalities Decreased step clearance    Assistive device Gait belt; Walker, rolling    Ambulation assistance - level surface 5 (Supervision/setup)    Distance 236 Feet (ft), Household distances during afternoon treatment session to and from steps    Ambulation assistance- uneven surface  NT    Comments Pt ambulates with forward flexed posture requiring intermittent and tactile cuing for upright posture. STEPS/STAIRS Daily Assessment     Steps/Stairs ambulated 4 (6\")    Assistance Required 4 (Contact guard assistance)    Rail Use Both    Comments Pt ascends/descends stairs with step to step pattern with right LE leading ascending and left LE leading descending with CGA for safety. During afternoon treatment session, pt also negotiated 6 (6\") steps with B handrails and close supervision for safety. However, unit  reports pt's son has concerns that pt would not be able to reach both rails at home. Therefore, pt also participated in side stepping utilizing left handrail for ascending and descending 4 (6\") steps.     Curbs/Ramps  NT        BALANCE Daily Assessment     Sitting - Static: Good (unsupported)  Sitting - Dynamic: Good (unsupported)  Standing - Static: Fair  Standing - Dynamic : Impaired        WHEELCHAIR MOBILITY Daily Assessment     Functional Level: 6 with B UEs and B LEs        THERAPEUTIC EXERCISES Daily Assessment     Seated B LE Strength Training  3 Sets of 10 Repetitions  Right and Left LAQ  Alternate Hip Flexion  Isometric Hip Add x 5\" holds  Red Theraband resisted Hip Abd/ER  Red Theraband resisted right and left hamstring curls        ASSESSMENT:  Pt demonstrates improved safety and tolerance with ascending/descending stairs today and improved activity tolerance with gait training. Progression toward goals:  [x]      Improving appropriately and progressing toward goals  []      Improving slowly and progressing toward goals  []      Not making progress toward goals and plan of care will be adjusted      PLAN:  Patient continues to benefit from skilled intervention to address the above impairments. Continue treatment per established plan of care. Emphasize functional strengthening and family education to prepare for safe d/c home. Discharge Recommendations:  Home Health Physical Therapy with 24 hour assistance  Further Equipment Recommendations for Discharge:  rolling walker      Estimated Discharge Date: 12/17/2021    Activity Tolerance:   Good  Please refer to the flowsheet for vital signs taken during this treatment.     After treatment:   [] Patient left in no apparent distress in bed  [] Patient left in no apparent distress sitting up in chair  [x] Patient left in no apparent distress in w/c mobilizing under own power  [] Patient left in no apparent distress dining area  [] Patient left in no apparent distress mobilizing under own power  [] Call bell left within reach  [] Nursing notified  [] Caregiver present  [] Bed alarm activated   [x] Chair alarm activated      Yessenia Gu PT, DPT   11/30/2021

## 2021-11-30 NOTE — PROGRESS NOTES
Problem: Self Care Deficits Care Plan (Adult)  Goal: *Therapy Goal (Edit Goal, Insert Text)  Description: Long Term Goals (to be met upon discharge date) in order to increase pt's functional independence and safety, and decrease burden of care:  1. Pt will perform self-feeding with Mod Independent  2. Pt will perform grooming with Mod Independent  3. Pt will perform UB bathing with supervision  4. Pt will perform LB bathing with SBA  5. Pt will perform shower transfer with SBA  6. Pt will perform UB dressing with supervision  7. Pt will perform LB dressing with SBA  8. Pt will perform toileting task with SBA  9. Pt will perform toilet transfer with SBA  10. Pt will perform an IADL task while standing with SBA     Short Term Weekly Goals for (2021-2021) in order to increase pt's functional independence and safety, and decrease burden of care:updated 21  1. Pt will perform self-feeding with supervision   21  2. Pt will perform grooming with supervision  21   3. Pt will perform UB bathing with SBA    21  4. Pt will perform LB bathing with Min A   21  5. Pt will perform shower transfer with Min A    21  6. Pt will perform UB dressing with SBA     21  7. Pt will perform LB dressing with Min A    21  8. Pt will perform toileting task with Min A   21  9. Pt will perform toilet transfer with Min A  21  Occupational Therapy TREATMENT    Patient: Bernard Guan   64 y.o. Patient identified with name and : Yes    Date: 2021    First Tx Session  Time In: 1100  Time Out[de-identified] 1200     Second Tx Session  Time In: 1430  Time Out[de-identified] 1500          Diagnosis: Multiple closed pelvic fractures without disruption of pelvic ring (HCC) [S32.82XA]   Precautions: Contact, Fall, Skin, PWB (50% Left LE)  Chart, occupational therapy assessment, plan of care, and goals were reviewed. Pain:  Pt reports 0/10 pain or discomfort prior to treatment. Pt reports 0/10 pain or discomfort post treatment. Intervention Provided: N/A    SUBJECTIVE:   Patient stated No pain today.     OBJECTIVE DATA SUMMARY:     THERAPEUTIC ACTIVITY Daily Assessment    Peg Board Task (set 5-23) to increase FM dexterity/manipulation for ADLs. Pt showed good FM control for ADLs dropping 0 pegs. Pt participated in cones x10 retrieval throughout gym to increase balance and safety with RW. Pt required slight vcs for safety. THERAPEUTIC EXERCISE Daily Assessment    To increase BUE and ROM strength for ADLs:  Sci Fit up to 10 minutes at level 1 without rest break this session. Pt showed increased strength, ROM, and endurance this session. Propelled w/c from room<>gym I. (first and second session). Bicep curls and low pulls with 3lb weight in hand. Shoulder  flexion/extension, abd/add,  rolls backward and forward, chest press 3x10 with stick. Chair raises 3x5         ASSESSMENT:  Pt working to increase BUE strength and safety with RW requiring S. Progression toward goals:  [x]          Improving appropriately and progressing toward goals  []          Improving slowly and progressing toward goals  []          Not making progress toward goals and plan of care will be adjusted     PLAN:  Patient continues to benefit from skilled intervention to address the above impairments. Continue treatment per established plan of care. Discharge Recommendations:  Home Health with asst  Further Equipment Recommendations for Discharge:  bedside commode      Activity Tolerance:  Fair+      Estimated LOS:    Please refer to the flowsheet for vital signs taken during this treatment.   After treatment:   [x]  Patient left in no apparent distress sitting up in chair   []  Patient left in no apparent distress in bed  []  Call bell left within reach  []  Nursing notified  []  Caregiver present  []  Bed alarm activated    COMMUNICATION/EDUCATION:   [] Home safety education was provided and the patient/caregiver indicated understanding. [x] Patient/family have participated as able in goal setting and plan of care. [] Patient/family agree to work toward stated goals and plan of care. [] Patient understands intent and goals of therapy, but is neutral about his/her participation. [] Patient is unable to participate in goal setting and plan of care.       Aleena Richmond, OT   Outcome: Progressing Towards Goal

## 2021-11-30 NOTE — PROGRESS NOTES
Problem: Falls - Risk of  Goal: *Absence of Falls  Description: Document Shamar Brownlee Fall Risk and appropriate interventions in the flowsheet.   Outcome: Progressing Towards Goal  Note: Fall Risk Interventions:  Mobility Interventions: Assess mobility with egress test, Bed/chair exit alarm, Communicate number of staff needed for ambulation/transfer, Patient to call before getting OOB, PT Consult for mobility concerns, PT Consult for assist device competence, Strengthening exercises (ROM-active/passive), Utilize gait belt for transfers/ambulation    Mentation Interventions: Adequate sleep, hydration, pain control, Bed/chair exit alarm, Door open when patient unattended, Evaluate medications/consider consulting pharmacy, Gait belt with transfers/ambulation, Toileting rounds    Medication Interventions: Patient to call before getting OOB, Bed/chair exit alarm    Elimination Interventions: Bed/chair exit alarm, Call light in reach, Patient to call for help with toileting needs, Stay With Me (per policy)    History of Falls Interventions: Bed/chair exit alarm, Consult care management for discharge planning, Door open when patient unattended, Evaluate medications/consider consulting pharmacy, Utilize gait belt for transfer/ambulation, Room close to nurse's station         Problem: Pain  Goal: *Control of Pain  11/29/2021 2323 by Paola Ro, RN  Outcome: Progressing Towards Goal  11/29/2021 2321 by Paola Ro RN  Outcome: Progressing Towards Goal     Problem: Inpatient Rehab (Adult)  Goal: *LTG: Avoids injury/falls 100% of time related to deficits  Outcome: Progressing Towards Goal     Problem: Inpatient Rehab (Adult)  Goal: *LTG: Avoids infection 100% of time related to deficits  Outcome: Progressing Towards Goal     Problem: Inpatient Rehab (Adult)  Goal: *LTG: Absence of DVT during hospitalization  Outcome: Progressing Towards Goal     Problem: Inpatient Rehab (Adult)  Goal: *LTG: Maintains Skin Integrity With No Evidence of Pressure Injury 100% of Time  Outcome: Progressing Towards Goal

## 2021-11-30 NOTE — INTERDISCIPLINARY ROUNDS
[facility] PHYSICAL REHABILITATION  [address] [place] [address], [address]    INPATIENT REHABILITATION  TEAM CONFERENCE SUMMARY     Date of Conference: 11/30/2021    Patient Information:        Name: Karyle Milch Age / Sex: 64 y.o. / female   CSN: [id number] MRN: [id number]   Admit Date: 11/19/2021 Length of Stay: 11 days     Primary Rehabilitation Diagnosis  1. Impaired Mobility and ADLs  2. 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 ([facility]) E11.22, N18.6    Secondary hyperparathyroidism of renal origin ([facility] [address] N25.81    Gastroesophageal reflux disease K21.9    History of recurrent urinary tract infection Z87.440    History of sepsis Z86.19    End-stage renal disease on hemodialysis ([facility]) N18.6, Z99.2    History of hydronephrosis Z87.448    History of septic shock Z86.19    Anticoagulated by anticoagulation treatment Z79.01    Paroxysmal atrial fibrillation ([facility]) I48.0    Closed fracture of left inferior pubic ramus, with routine healing, subsequent encounter S32.592D    Closed nondisplaced fracture of anterior wall of left acetabulum with routine healing S32.415D    Closed fracture of superior pubic ramus, left, with routine healing, subsequent encounter S32.512D    Multiple closed pelvic fractures without disruption of pelvic ring ([address] S32.82XA    Depression F32. A    History of urinary tract infection Z87.440    Need for prophylactic isolation Z41.8    Hyperlipidemia E78.5    Hypothyroidism E03.9    History of kidney stones Z87.442    Chronic pain G89.29    Lung mass R91.8    History of urethral stricture Z87.448    Uric acid nephrolithiasis N20.0    Urinary incontinence R32    Glaucoma H40.9    Hyperphosphatemia E83.39    Mononeuropathy G58.9    Hyperkalemia E87.5          Therapy:     FIM SCORES Initial Assessment Weekly Progress Assessment 11/30/2021   Eating Functional Level: 5  Comments: setup on bedside table  6   Swallowing     Grooming 5     Bathing 3        Upper Body Dressing Functional Level: 4  Items Applied/Steps Completed: Pullover (4 steps)  Comments: set up while seated on EOB     Lower Body Dressing Functional Level: 3  Items Applied/Steps Completed: Shoe, left (1 step), Shoe, right (1 step), Sock, left (1 step), Sock, right (1 step), Elastic waist pants (3 steps)  Comments: min vc to direction for safety to stand & pull pants over hips     Toileting Functional Level: 3  Comments: clothing management wearing scrubs due to not having her clothes here yet  5   Bladder 1 0   Bowel  1 1   Toilet Transfer [place] Toilet Transfer Score: 4  Comments: rw     Tub/Shower Transfer [place] Tub or Shower Type: Shower (using tub transfer bench)  Tub/Shower Transfer Score: 4  Comments: uses rw & TTWB        Comprehension Primary Mode of Comprehension: Auditory  Score: 5        Expression Primary Mode of Expression: Verbal  Score: 5  Comments: expresses her needs appropriately        Social Interaction Score: 4  Comments: polite & nice     Problem Solving Score: 4  Comments: she knew to don her shoes so not to slide on the floor with her socks on before we used RW to go from her bed to chair     Memory Score: 4  Comments: appears WNL with sharing her medical hx & family dynamics       FIM SCORES Initial Assessment Weekly Progress Assessment 11/30/2021   Bed/Chair/Wheelchair Transfers Transfer Type: SPT with walker (mod/max A due to loss of balance and needing recovery)  Other: lateral transfer with transfer board (needing mod/max A)  Transfer Assistance :  (mod/max A for stabilization and for maintaining TTWB left LE)  Sit to Stand Assistance: Moderate assistance (lifting assistance, support left LE to ensure TTWB)  Car Transfers: Not tested  Car Type: NT Transfer Type:  Other  Other: stand step with RW  Transfer Assistance : 5 (Supervision/setup)  Sit to Stand Assistance: Modified independent   Bed Mobility Rolling Right 4 (Minimal assistance)   Rolling Left 4 (Minimal assistance)   Supine to Sit 3 (Moderate assistance) (flat head of bed, no railings, needing minimal trunk support)   Sit to Stand Moderate assistance (lifting assistance, support left LE to ensure TTWB)   Sit to Supine 3 (Moderate assistance) (head of bed flat, no rails, assist w/ left LE and reposition)    Rolling Right       Rolling Left       Supine to Sit       Sit to Stand   Modified independent   Sit to Supine          Locomotion (W/C) Able to Propel (ft): 230 feet  Functional Level: 4  Curbs/Ramps Assist Required (FIM Score): 0 (Not tested)  Wheelchair Setup Assist Required : 2 (Maximal assistance)  Wheelchair Management: Manages left brake, Manages right brake (assistance with armrests, footrests) Function 6  Setup Assistance         Locomotion (W/C distance)       Locomotion (Walk) 0 (Not tested) 5 (Supervision/setup)  Gait belt; Walker, rolling   Locomotion (Walk dist.) 0 Feet (ft) 236 Feet (ft)   Steps/Stairs Steps/Stairs Ambulated (#): 0  Level of Assist : 0 (Not tested)  Rail Use:  (NT) Steps/Stairs Ambulated (#): 2 (6\")  Level of Assist : Minimal Assistance  Rail Use:  Bilateral         Nursing:     Neuro:   AAA&O x   4         Respiratory:   [x] WNL   [] O2 LPM:   Other:  Peripheral Vascular:   [] TEDS present   [] Edema present ____ Grade   Cardiac:   [x] WNL   [] Other  Genitourinary:   [] continent   [] incontinent   [] chua  Abdominal _______ LBM  GI: _reg.______ Diet _thin_____ Liquids _____ tube feeds  Musculoskeletal: ____ ROM Transfers _____ Assistive Device Used  ____ Level of Assistance  Skin Integumentary:   [x] Intact   [] Not Intact   __________Preventative Measures  Details______________________________________________________________  Pain: [] Controlled   [] Not Controlled   Pain Meds:   [] Scheduled   [x] PRN        Registered Dietitian / Nutrition:   No data found. Pt off unit at time of visit. Has good meal intake per chart documentation, %. Tolerating diet. Supplements:          [x] Yes: Magic Cup TID    [] No      Amount of supplement consumed:  Unable to assess at time of visit      Intake/Output Summary (Last 24 hours) at 11/30/2021 1402  Last data filed at 11/30/2021 1005  Gross per 24 hour   Intake 360 ml   Output 1000 ml   Net -640 ml                                Last bowel movement: 11/28      Interdisciplinary Team Goals:     1. Discipline  Physical Therapy    Goal  Encourage pt to perform functional transfers with RW consistently at a modified independence level. Barrier  PWB left LE, Impaired functional strength and balance, left LE pain    Intervention  Education, Transfer training, Strength and Balance training    Goal written by:   Raul Crabtree PT, DPT     2. Discipline  Occupational Therapy    Goal  Missy with bathing and dressing following weightbearing precautions    Barrier  Decreased strength, balance, and weightbearing precautions    Intervention  Therex, Theract, NMRE, Self care Retraining    Goal written by:  Neeru Lamb, MSOTR/L      3. Discipline  Speech Therapy    Goal      Barrier      Intervention      Goal written by:       4. Discipline  Nursing    Goal  pain control    Barrier  pelvic fx. S/p surgery    Intervention  pain meds as scheduled and prn    Goal written by:  Obed Zimmer     5.   Discipline  Clinical Psychology    Goal  Maintain mood stability in continued recovery    Barrier  Stress with injury and forced dependency in recovery    Intervention  Support  as needed    Goal written by:  Nahun Arevalo, PhD     6. Discipline  Nutrition / Dietetics    Goal  - PO nutrition intake will meet >75% of patient estimated nutritional needs within the next 7 days. Barrier  none known     Intervention  continue po diet and nutrition supplements. Monitor po intake of meals/ supplements     Goal written by:  Damian Dee RD       Disposition / Discharge Planning:      Follow-up services:  [x] Physical Therapy             [x] Occupational Therapy       [] Speech Therapy           [] Skilled Nursing      [] Medical Social Worker   [] Aide        [] Outpatient      [] vs   [x] Home Health  [] vs       [x] to progress to outpatient       [] with 24-hour supervision       [x] with 24-hour assistance   [] [place]   [facility] recommendations:  Pt owns RW   Estimated discharge date:  12/17/2021   Discharge Location:  [x] Home  [] versus    [] [place]    [] [facility]   [] Other:           Interdisciplinary team rounds were held this PM with the following team members:       Name   Physical Therapist    Pérez Crump, PT, DPT   Occupational Therapist    Preston Yee, MSOTR/L   Recreational Therapist    Josette Baeza, [address] [place] [address]    Jomar Mooney, JOSE ANTONIO      Physician    Sigifredo Smalls MD        Sana Parrish, MSW          Signed:  Sigifredo Smalls MD    November 30, 2021

## 2021-12-01 LAB
ALBUMIN SERPL-MCNC: 3 G/DL (ref 3.4–5)
ANION GAP SERPL CALC-SCNC: 9 MMOL/L (ref 3–18)
BASOPHILS # BLD: 0.1 K/UL (ref 0–0.1)
BASOPHILS NFR BLD: 1 % (ref 0–2)
BUN SERPL-MCNC: 52 MG/DL (ref 7–18)
BUN/CREAT SERPL: 8 (ref 12–20)
CALCIUM SERPL-MCNC: 9.1 MG/DL (ref 8.5–10.1)
CHLORIDE SERPL-SCNC: 99 MMOL/L (ref 100–111)
CO2 SERPL-SCNC: 25 MMOL/L (ref 21–32)
CREAT SERPL-MCNC: 6.51 MG/DL (ref 0.6–1.3)
DIFFERENTIAL METHOD BLD: ABNORMAL
EOSINOPHIL # BLD: 0.1 K/UL (ref 0–0.4)
EOSINOPHIL NFR BLD: 2 % (ref 0–5)
ERYTHROCYTE [DISTWIDTH] IN BLOOD BY AUTOMATED COUNT: 16.9 % (ref 11.6–14.5)
GLUCOSE SERPL-MCNC: 83 MG/DL (ref 74–99)
HCT VFR BLD AUTO: 28.1 % (ref 35–45)
HGB BLD-MCNC: 8.8 G/DL (ref 12–16)
IMM GRANULOCYTES # BLD AUTO: 0.1 K/UL (ref 0–0.04)
IMM GRANULOCYTES NFR BLD AUTO: 1 % (ref 0–0.5)
LYMPHOCYTES # BLD: 2 K/UL (ref 0.9–3.6)
LYMPHOCYTES NFR BLD: 36 % (ref 21–52)
MCH RBC QN AUTO: 31.2 PG (ref 24–34)
MCHC RBC AUTO-ENTMCNC: 31.3 G/DL (ref 31–37)
MCV RBC AUTO: 99.6 FL (ref 78–100)
MONOCYTES # BLD: 0.8 K/UL (ref 0.05–1.2)
MONOCYTES NFR BLD: 13 % (ref 3–10)
NEUTS SEG # BLD: 2.7 K/UL (ref 1.8–8)
NEUTS SEG NFR BLD: 47 % (ref 40–73)
NRBC # BLD: 0 K/UL (ref 0–0.01)
NRBC BLD-RTO: 0 PER 100 WBC
PHOSPHATE SERPL-MCNC: 7.2 MG/DL (ref 2.5–4.9)
PLATELET # BLD AUTO: 262 K/UL (ref 135–420)
PMV BLD AUTO: 9.4 FL (ref 9.2–11.8)
POTASSIUM SERPL-SCNC: 5.9 MMOL/L (ref 3.5–5.5)
RBC # BLD AUTO: 2.82 M/UL (ref 4.2–5.3)
SODIUM SERPL-SCNC: 133 MMOL/L (ref 136–145)
WBC # BLD AUTO: 5.7 K/UL (ref 4.6–13.2)

## 2021-12-01 PROCEDURE — 74011000250 HC RX REV CODE- 250: Performed by: INTERNAL MEDICINE

## 2021-12-01 PROCEDURE — 36415 COLL VENOUS BLD VENIPUNCTURE: CPT

## 2021-12-01 PROCEDURE — 74011250637 HC RX REV CODE- 250/637: Performed by: FAMILY MEDICINE

## 2021-12-01 PROCEDURE — 74011250637 HC RX REV CODE- 250/637: Performed by: INTERNAL MEDICINE

## 2021-12-01 PROCEDURE — 97110 THERAPEUTIC EXERCISES: CPT

## 2021-12-01 PROCEDURE — 74011250636 HC RX REV CODE- 250/636: Performed by: INTERNAL MEDICINE

## 2021-12-01 PROCEDURE — 97535 SELF CARE MNGMENT TRAINING: CPT

## 2021-12-01 PROCEDURE — 99232 SBSQ HOSP IP/OBS MODERATE 35: CPT | Performed by: INTERNAL MEDICINE

## 2021-12-01 PROCEDURE — 80069 RENAL FUNCTION PANEL: CPT

## 2021-12-01 PROCEDURE — 90935 HEMODIALYSIS ONE EVALUATION: CPT

## 2021-12-01 PROCEDURE — 85025 COMPLETE CBC W/AUTO DIFF WBC: CPT

## 2021-12-01 PROCEDURE — 2709999900 HC NON-CHARGEABLE SUPPLY

## 2021-12-01 PROCEDURE — 97530 THERAPEUTIC ACTIVITIES: CPT

## 2021-12-01 PROCEDURE — 97116 GAIT TRAINING THERAPY: CPT

## 2021-12-01 PROCEDURE — 65310000000 HC RM PRIVATE REHAB

## 2021-12-01 RX ADMIN — SEVELAMER CARBONATE 1600 MG: 800 TABLET, FILM COATED ORAL at 09:17

## 2021-12-01 RX ADMIN — MIDODRINE HYDROCHLORIDE 5 MG: 5 TABLET ORAL at 09:17

## 2021-12-01 RX ADMIN — ACETAMINOPHEN 650 MG: 325 TABLET ORAL at 09:17

## 2021-12-01 RX ADMIN — PANTOPRAZOLE SODIUM 20 MG: 20 TABLET, DELAYED RELEASE ORAL at 06:49

## 2021-12-01 RX ADMIN — LEVOTHYROXINE SODIUM 125 MCG: 125 TABLET ORAL at 06:49

## 2021-12-01 RX ADMIN — LATANOPROST 1 DROP: 50 SOLUTION OPHTHALMIC at 20:36

## 2021-12-01 RX ADMIN — SEVELAMER CARBONATE 1600 MG: 800 TABLET, FILM COATED ORAL at 18:36

## 2021-12-01 RX ADMIN — GABAPENTIN 200 MG: 100 CAPSULE ORAL at 18:36

## 2021-12-01 RX ADMIN — APIXABAN 5 MG: 5 TABLET, FILM COATED ORAL at 09:17

## 2021-12-01 RX ADMIN — MIDODRINE HYDROCHLORIDE 5 MG: 5 TABLET ORAL at 18:36

## 2021-12-01 RX ADMIN — EPOETIN ALFA-EPBX 8000 UNITS: 4000 INJECTION, SOLUTION INTRAVENOUS; SUBCUTANEOUS at 20:36

## 2021-12-01 RX ADMIN — CHOLECALCIFEROL TAB 25 MCG (1000 UNIT) 2000 UNITS: 25 TAB at 18:36

## 2021-12-01 RX ADMIN — CETIRIZINE HYDROCHLORIDE 10 MG: 10 TABLET, FILM COATED ORAL at 09:17

## 2021-12-01 RX ADMIN — ALLOPURINOL 100 MG: 100 TABLET ORAL at 20:36

## 2021-12-01 RX ADMIN — CHOLECALCIFEROL TAB 25 MCG (1000 UNIT) 2000 UNITS: 25 TAB at 09:17

## 2021-12-01 RX ADMIN — DULOXETINE HYDROCHLORIDE 20 MG: 20 CAPSULE, DELAYED RELEASE ORAL at 09:18

## 2021-12-01 RX ADMIN — LIDOCAINE AND PRILOCAINE: 25; 25 CREAM TOPICAL at 12:28

## 2021-12-01 RX ADMIN — ACETAMINOPHEN 650 MG: 325 TABLET ORAL at 12:26

## 2021-12-01 RX ADMIN — CYANOCOBALAMIN TAB 1000 MCG 1000 MCG: 1000 TAB at 09:17

## 2021-12-01 RX ADMIN — SEVELAMER CARBONATE 1600 MG: 800 TABLET, FILM COATED ORAL at 12:26

## 2021-12-01 RX ADMIN — APIXABAN 5 MG: 5 TABLET, FILM COATED ORAL at 18:36

## 2021-12-01 RX ADMIN — ACETAMINOPHEN 650 MG: 325 TABLET ORAL at 21:56

## 2021-12-01 RX ADMIN — Medication 1 CAPSULE: at 09:17

## 2021-12-01 RX ADMIN — DOXERCALCIFEROL 4 MCG: 4 INJECTION, SOLUTION INTRAVENOUS at 15:28

## 2021-12-01 RX ADMIN — ACETAMINOPHEN 650 MG: 325 TABLET ORAL at 17:12

## 2021-12-01 RX ADMIN — MIDODRINE HYDROCHLORIDE 5 MG: 5 TABLET ORAL at 12:26

## 2021-12-01 RX ADMIN — Medication 1 TABLET: at 09:17

## 2021-12-01 RX ADMIN — AMIODARONE HYDROCHLORIDE 200 MG: 200 TABLET ORAL at 09:18

## 2021-12-01 RX ADMIN — LIDOCAINE AND PRILOCAINE: 25; 25 CREAM TOPICAL at 09:52

## 2021-12-01 RX ADMIN — SODIUM ZIRCONIUM CYCLOSILICATE 5 G: 5 POWDER, FOR SUSPENSION ORAL at 18:45

## 2021-12-01 RX ADMIN — Medication 500 MG: at 09:17

## 2021-12-01 NOTE — PROGRESS NOTES
Pt is a 64year old female admitted to ARU for a pelvic fracture. Pt has been living with her son in a 2 level home with 4 steps to enter, bilateral rails. Bedrooms are on the second floor with 14 steps to ascend. Prior to admission pt reports staying on the first floor in a recliner and a bedside commode. Pt reports that she was modified independent prior to her acute admission with the use of a rolling walker, rollator, shower chair and bedside commode. Pt and son report that pt was active with [facility] prior to admission. Pt denies history with outpatient therapy or SNF. Pt reports that her son, Lemuel Foster ([phone]), is her [place] contact. Pt reports that her son will be her support at [place]. Pt consents to calling her son for caregiver education. Pt has [address]. Sw reviewed dc planning, team conference, caregiver education and insurance updates. Sw will follow.

## 2021-12-01 NOTE — PROGRESS NOTES
Problem: Self Care Deficits Care Plan (Adult)  Goal: *Therapy Goal (Edit Goal, Insert Text)  Description: Long Term Goals (to be met upon discharge date) in order to increase pt's functional independence and safety, and decrease burden of care:  1. Pt will perform self-feeding with Mod Independent  2. Pt will perform grooming with Mod Independent  3. Pt will perform UB bathing with supervision  4. Pt will perform LB bathing with SBA  5. Pt will perform shower transfer with SBA  6. Pt will perform UB dressing with supervision  7. Pt will perform LB dressing with SBA  8. Pt will perform toileting task with SBA  9. Pt will perform toilet transfer with SBA  10. Pt will perform an IADL task while standing with SBA     Short Term Weekly Goals for (2021-2021) in order to increase pt's functional independence and safety, and decrease burden of care:updated 21  1. Pt will perform self-feeding with supervision   21  2. Pt will perform grooming with supervision  21   3. Pt will perform UB bathing with SBA    21  4. Pt will perform LB bathing with Min A   21  5. Pt will perform shower transfer with Min A    21  6. Pt will perform UB dressing with SBA     21  7. Pt will perform LB dressing with Min A    21  8. Pt will perform toileting task with Min A   21  9. Pt will perform toilet transfer with Min A  21  Occupational Therapy TREATMENT    Patient: Alon Ring   64 y.o. Patient identified with name and : Yes     Date: 2021    First Tx Session  Time In: 0730  Time Out[de-identified] 0900      Diagnosis: Multiple closed pelvic fractures without disruption of pelvic ring (HCC) [S32.82XA]   Precautions: Contact, Fall, Skin, PWB (50% Left LE)  Chart, occupational therapy assessment, plan of care, and goals were reviewed. Pain:  Pt reports 0/10 pain or discomfort prior to treatment. Pt reports 0/10 pain or discomfort post treatment. Intervention Provided: N/A      SUBJECTIVE:   Patient stated I have a chair in the shower.      OBJECTIVE DATA SUMMARY:       THERAPEUTIC EXERCISE Daily Assessment    To increase strength for ADLs:  Sci Fit up to 10 minutes at level 1. Bicep curls, chest pulls, low pulls, and front raises 2x15 using 2lb weight in hands. Pt showed increased strength requiring vcs for HEP. Pt noted to initiate a slight break in between reps and demonstrate decreased ROM at both shoulders 2/2 past rotator cuff injury. GROOMING Daily Assessment    Grooming  Grooming Assistance : 6 (Modified independent)     UPPER BODY BATHING Daily Assessment    Upper Body Bathing  Bathing Assistance, Upper: 6 (Modified independent)  Position Performed: Seated in chair     LOWER BODY BATHING Daily Assessment    Lower Body Bathing  Bathing Assistance, Lower : 5 (Supervision)  Position Performed: Seated in chair; Standing     UPPER BODY DRESSING Daily Assessment    Upper Body Dressing   Dressing Assistance : 5 (Supervision)  Adaptive Equipment:  (setup)     LOWER BODY DRESSING Daily Assessment    Lower Body Dressing   Dressing Assistance : 5 (Supervision) (setup)  Leg Crossed Method Used: Yes  Position Performed: Seated in chair; Standing     MOBILITY/TRANSFERS Daily Assessment    Functional Transfers  Tub or Shower Type: Shower (using tub bench)  Amount of Assistance Required: 5 (Supervision/setup) (vcs for tech)       ASSESSMENT:  Pt working on increasing BUE strength and safe functional transfers. Progression toward goals:  [x]          Improving appropriately and progressing toward goals  []          Improving slowly and progressing toward goals  []          Not making progress toward goals and plan of care will be adjusted     PLAN:  Patient continues to benefit from skilled intervention to address the above impairments. Continue treatment per established plan of care.   Discharge Recommendations:  Home Health with asst  Further Equipment Recommendations for Discharge:  bedside commode      Activity Tolerance:  Fair      Estimated LOS:    Please refer to the flowsheet for vital signs taken during this treatment. After treatment:   [x]  Patient left in no apparent distress sitting up in chair   []  Patient left in no apparent distress in bed  []  Call bell left within reach  []  Nursing notified  []  Caregiver present  []  Bed alarm activated    COMMUNICATION/EDUCATION:   [] Home safety education was provided and the patient/caregiver indicated understanding. [x] Patient/family have participated as able in goal setting and plan of care. [] Patient/family agree to work toward stated goals and plan of care. [] Patient understands intent and goals of therapy, but is neutral about his/her participation. [] Patient is unable to participate in goal setting and plan of care.       Mallika Arreguin OT   Outcome: Progressing Towards Goal

## 2021-12-01 NOTE — ROUTINE PROCESS
SHIFT CHANGE NOTE FOR [facility]    Bedside and Verbal shift change report given to Logan Murillo (oncoming nurse) by Nav Nieto RN (offgoing nurse). Report included the following information SBAR, Kardex, MAR and Recent Results.     Situation:   Code Status: DNR   Hospital Day: 12   Problem List:   Hospital Problems  Date Reviewed: 11/30/2021          Codes Class Noted POA    Hyperkalemia ICD-10-CM: E87.5  ICD-9-CM: 276.7  11/29/2021 No        Mononeuropathy (Chronic) ICD-10-CM: G58.9  ICD-9-CM: 355.9  Unknown Yes    Overview Signed 11/28/2021 11:29 AM by Vivian Conrad MD     Involving ring finger of left hand             * (Principal) Multiple closed pelvic fractures without disruption of pelvic ring ([facility] [address] ICD-10-CM: P56.20TL  ICD-9-CM: 808.44  11/19/2021 Yes        Hyperphosphatemia ICD-10-CM: E83.39  ICD-9-CM: 275.3  11/14/2021 Yes        Anticoagulated by anticoagulation treatment ICD-10-CM: Z79.01  ICD-9-CM: V58.61  Unknown Yes    Overview Signed 11/23/2021  9:49 AM by Vivian Conrad MD     On Apixaban             Paroxysmal atrial fibrillation (HCC) (Chronic) ICD-10-CM: I48.0  ICD-9-CM: 427.31  Unknown Yes        Closed fracture of left inferior pubic ramus, with routine healing, subsequent encounter ICD-10-CM: S32.592D  ICD-9-CM: V54.13  11/12/2021 Yes        Closed nondisplaced fracture of anterior wall of left acetabulum with routine healing ICD-10-CM: S32.415D  ICD-9-CM: V54.19  11/12/2021 Yes        Closed fracture of superior pubic ramus, left, with routine healing, subsequent encounter ICD-10-CM: S32.512D  ICD-9-CM: V54.19  11/12/2021 Yes        History of infection with vancomycin resistant Enterococcus (VRE) ICD-10-CM: X11.42  ICD-9-CM: V12.09  10/8/2021 Yes    Overview Signed 11/23/2021  9:51 AM by Vivian Conrad MD     Urine culture (collected 10/8/2021, resulted 10/14/2021) yielded growth of >100,000 colonies/ml of Enterococcus faecalis RESISTANT to Ciprofloxacin, Levofloxacin, Tetracycline and Vancomycin             History of urinary tract infection ICD-10-CM: Z87.440  ICD-9-CM: V13.02  10/8/2021 Yes    Overview Signed 11/23/2021  9:52 AM by Ruddy Pires MD     Urine culture (collected 10/8/2021, resulted 10/14/2021) yielded growth of >100,000 colonies/ml of Enterococcus faecalis RESISTANT to Ciprofloxacin, Levofloxacin, Tetracycline and Vancomycin             Need for prophylactic isolation ICD-10-CM: Z41.8  ICD-9-CM: V07.0  10/8/2021 Yes    Overview Signed 11/23/2021  9:52 AM by Ruddy Pires MD     Urine culture (collected 10/8/2021, resulted 10/14/2021) yielded growth of >100,000 colonies/ml of Enterococcus faecalis RESISTANT to Ciprofloxacin, Levofloxacin, Tetracycline and Vancomycin             End-stage renal disease on hemodialysis (HCC) (Chronic) ICD-10-CM: N18.6, Z99.2  ICD-9-CM: 585.6, V45.11  Unknown Yes    Overview Signed 11/23/2021  9:56 AM by Ruddy Pires MD     HD at [address] [place] [address] on MWF.  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 ([facility] [address] (Chronic) ICD-10-CM: E11.22, N18.6  ICD-9-CM: 250.40, 585.6  Unknown Yes    Overview Signed 11/23/2021  9:50 AM by Ruddy Pires MD     HbA1c (10/8/2021) = 4.6             Chronic hypotension (Chronic) ICD-10-CM: I95.89  ICD-9-CM: 458.1  Unknown Yes    Overview Signed 11/23/2021 10:01 AM by Ruddy Pires MD     On Midodrine                   Background:   Past Medical History:   Past Medical History:   Diagnosis Date    Anemia in end-stage renal disease ([facility] [address]     Anticoagulated by anticoagulation treatment     On Apixaban    Chronic hypotension     On Midodrine    Chronic pain     Closed fracture of left inferior pubic ramus, with routine healing, subsequent encounter 11/12/2021    Closed fracture of superior pubic ramus, left, with routine healing, subsequent encounter 11/12/2021    Closed nondisplaced fracture of anterior wall of left acetabulum with routine healing 11/12/2021    Depression     End-stage renal disease on hemodialysis (HCC)     HD at [facility] on MWF.  Tel # [phone]    Gastroesophageal reflux disease     Glaucoma     History of acute pyelonephritis 2/20/2020    History of hydronephrosis 10/5/2021    History of infection with vancomycin resistant Enterococcus (VRE) 10/8/2021    Urine culture (collected 10/8/2021, resulted 10/14/2021) yielded growth of >100,000 colonies/ml of Enterococcus faecalis RESISTANT to Ciprofloxacin, Levofloxacin, Tetracycline and Vancomycin    History of kidney stones     History of recurrent urinary tract infection     History of sepsis 6/18/2021    History of septic shock 10/8/2021    History of urethral stricture     History of urinary tract infection 10/8/2021    Urine culture (collected 10/8/2021, resulted 10/14/2021) yielded growth of >100,000 colonies/ml of Enterococcus faecalis RESISTANT to Ciprofloxacin, Levofloxacin, Tetracycline and Vancomycin    Hyperlipidemia     Hyperphosphatemia 11/14/2021    Hypothyroidism     Lung mass     Mononeuropathy     Involving ring finger of left hand    Need for prophylactic isolation 10/8/2021    Urine culture (collected 10/8/2021, resulted 10/14/2021) yielded growth of >100,000 colonies/ml of Enterococcus faecalis RESISTANT to Ciprofloxacin, Levofloxacin, Tetracycline and Vancomycin    Paroxysmal atrial fibrillation (HCC)     Secondary hyperparathyroidism of renal origin ([facility] [address]     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 ([facility] [address]     HbA1c (10/8/2021) = 4.6    Uric acid nephrolithiasis     Urinary incontinence         Assessment:   Changes in Assessment throughout shift: No change to previous assessment     Patient has a central line: no Reasons if yes: n/a  Insertion date:n/a Last dressing date:n/a   Patient has Chua Cath: no Reasons if yes: n/a   Insertion date:n/a  Shift chua care completed: N/A     Last Vitals:     Vitals:    11/30/21 1038 11/30/21 1207 11/30/21 1543 11/30/21 2104   BP:  (!) 124/58 118/65 (!) 115/45   Pulse:  76 73 84   Resp:   16 20   Temp:   97 °F (36.1 °C) 96.8 °F (36 °C)   TempSrc:       SpO2:   96% 98%   Weight: 95 kg (209 lb 8 oz)      Height:            PAIN    Pain Assessment    Pain Intensity 1: 0 (12/01/21 0403) Pain Intensity 1: 2 (12/29/14 1105)    Pain Location 1: Back, Leg Pain Location 1: Abdomen    Pain Intervention(s) 1: Medication (see MAR) Pain Intervention(s) 1: Medication (see MAR)  Patient Stated Pain Goal: Unable to verbalize/indicate pain (asleep) Patient Stated Pain Goal: 0  o Intervention effective: yes  o Other actions taken for pain: Medication (see MAR)     Skin Assessment  Skin color Skin Color: Appropriate for ethnicity  Condition/Temperature Skin Condition/Temp: Dry, Warm  Integrity Skin Integrity: Scars (comment) (both knees)  Turgor Turgor: Non-tenting  Weekly Pressure Ulcer Documentation  Pressure  Injury Documentation: No Pressure Injury Noted-Pressure Ulcer Prevention Initiated  Wound Prevention & Protection Methods  Orientation of wound Orientation of Wound Prevention: Posterior  Location of Prevention Location of Wound Prevention: Buttocks, Sacrum/Coccyx  Dressing Present Dressing Present : No  Dressing Status    Wound Offloading Wound Offloading (Prevention Methods): Bed, pressure reduction mattress     INTAKE/OUPUT  Date 11/30/21 0700 - 12/01/21 0659 12/01/21 0700 - 12/02/21 0659   Shift 8531-7905 1966-6330 24 Hour Total 4252-4435 6695-2757 24 Hour Total   INTAKE   P.O. 600 120 720        P. O. 600 120 720      Shift Total(mL/kg) 600(6.3) 120(1.3) 720(7.6)      OUTPUT   Urine(mL/kg/hr)           Urine Occurrence(s) 0 x 0 x 0 x      Stool           Stool Occurrence(s) 0 x 0 x 0 x      Shift Total(mL/kg)          120 720      Weight (kg) 95 95 95 95 95 95       Recommendations:  1. Patient needs and requests: assistance with ADL's    2.  Pending tests/procedures: dialysis today 3. Functional Level/Equipment: Partial (one person) / Wheelchair    Fall Precautions:   Fall risk precautions were reinforced with the patient; she was instructed to call for help prior to getting up. The following fall risk precautions were continued: bed/ chair alarms, door signage, yellow bracelet and socks as well as update of the Earma Olivia tool in the patient's room. Freddy Score: 4    HEALS Safety Check    A safety check occurred in the patient's room between off going nurse and oncoming nurse listed above. The safety check included the below items  Area Items   H  High Alert Medications - Verify all high alert medication drips (heparin, PCA, etc.)   E  Equipment - Suction is set up for ALL patients (with adelaide)  - Red plugs utilized for all equipment (IV pumps, etc.)  - WOWs wiped down at end of shift.  - Room stocked with oxygen, suction, and other unit-specific supplies   A  Alarms - Bed alarm is set for fall risk patients  - Ensure chair alarm is in place and activated if patient is up in a chair   L  Lines - Check IV for any infiltration  - Cline bag is empty if patient has a Cline   - Tubing and IV bags are labeled   S  Safety   - Room is clean, patient is clean, and equipment is clean. - Hallways are clear from equipment besides carts. - Fall bracelet on for fall risk patients  - Ensure room is clear and free of clutter  - Suction is set up for ALL patients (with adelaide)  - Hallways are clear from equipment besides carts.    - Isolation precautions followed, supplies available outside room, sign posted     Miriam Dee RN

## 2021-12-01 NOTE — PROGRESS NOTES
Problem: Risk for Spread of Infection  Goal: Prevent transmission of infectious organism to others  Description: Prevent the transmission of infectious organisms to other patients, staff members, and visitors. Outcome: Progressing Towards Goal     Problem: Patient Education:  Go to Education Activity  Goal: Patient/Family Education  Outcome: Progressing Towards Goal     Problem: Falls - Risk of  Goal: *Absence of Falls  Description: Document Sangeeta Haro Fall Risk and appropriate interventions in the flowsheet.   Outcome: Progressing Towards Goal  Note: Fall Risk Interventions:  Mobility Interventions: Assess mobility with egress test, Bed/chair exit alarm, Patient to call before getting OOB, Utilize walker, cane, or other assistive device, Utilize gait belt for transfers/ambulation    Mentation Interventions: Adequate sleep, hydration, pain control, Bed/chair exit alarm, Door open when patient unattended, Gait belt with transfers/ambulation, Increase mobility, Self-releasing belt, Toileting rounds, Update white board    Medication Interventions: Assess postural VS orthostatic hypotension, Bed/chair exit alarm, Patient to call before getting OOB, Teach patient to arise slowly, Utilize gait belt for transfers/ambulation    Elimination Interventions: Bed/chair exit alarm, Call light in reach, Elevated toilet seat, Patient to call for help with toileting needs, Stay With Me (per policy), Toilet paper/wipes in reach, Toileting schedule/hourly rounds    History of Falls Interventions: Bed/chair exit alarm, Door open when patient unattended, Investigate reason for fall, Room close to nurse's station, Utilize gait belt for transfer/ambulation         Problem: Patient Education: Go to Patient Education Activity  Goal: Patient/Family Education  Outcome: Progressing Towards Goal     Problem: Pressure Injury - Risk of  Goal: *Prevention of pressure injury  Description: Document Giovany Scale and appropriate interventions in the flowsheet.   Outcome: Progressing Towards Goal  Note: Pressure Injury Interventions:  Sensory Interventions: Assess changes in LOC, Assess need for specialty bed, Chair cushion, Check visual cues for pain, Float heels, Keep linens dry and wrinkle-free, Maintain/enhance activity level, Minimize linen layers, Pressure redistribution bed/mattress (bed type)    Moisture Interventions: Absorbent underpads, Apply protective barrier, creams and emollients, Maintain skin hydration (lotion/cream), Minimize layers, Moisture barrier    Activity Interventions: Chair cushion, Increase time out of bed, Pressure redistribution bed/mattress(bed type), Assess need for specialty bed    Mobility Interventions: Assess need for specialty bed, Chair cushion, Float heels, HOB 30 degrees or less, Pressure redistribution bed/mattress (bed type)    Nutrition Interventions: Document food/fluid/supplement intake    Friction and Shear Interventions: Apply protective barrier, creams and emollients, Feet elevated on foot rest, HOB 30 degrees or less, Minimize layers                Problem: Patient Education: Go to Patient Education Activity  Goal: Patient/Family Education  Outcome: Progressing Towards Goal     Problem: Patient Education: Go to Patient Education Activity  Goal: Patient/Family Education  Outcome: Progressing Towards Goal     Problem: Patient Education: Go to Patient Education Activity  Goal: Patient/Family Education  Outcome: Progressing Towards Goal     Problem: Pain  Goal: *Control of Pain  Outcome: Progressing Towards Goal     Problem: Patient Education: Go to Patient Education Activity  Goal: Patient/Family Education  Outcome: Progressing Towards Goal     Problem: Inpatient Rehab (Adult)  Goal: *LTG: Avoids injury/falls 100% of time related to deficits  Outcome: Progressing Towards Goal  Goal: *LTG: Avoids infection 100% of time related to deficits  Outcome: Progressing Towards Goal  Goal: *LTG: Absence of DVT during hospitalization  Outcome: Progressing Towards Goal  Goal: *LTG: Maintains Skin Integrity With No Evidence of Pressure Injury 100% of Time  Outcome: Progressing Towards Goal  Goal: Interventions  Outcome: Progressing Towards Goal     Problem: Patient Education: Go to Patient Education Activity  Goal: Patient/Family Education  Outcome: Progressing Towards Goal

## 2021-12-01 NOTE — ROUTINE PROCESS
SHIFT CHANGE NOTE FOR [facility]    Bedside and Verbal shift change report given to Dilcia Jernigan RN (oncoming nurse) by Jennifer Arias (offgoing nurse). Report included the following information SBAR, Kardex, MAR and Recent Results.     Situation:   Code Status: DNR   Hospital Day: 12   Problem List:   Hospital Problems  Date Reviewed: 12/1/2021          Codes Class Noted POA    Hyperkalemia ICD-10-CM: E87.5  ICD-9-CM: 276.7  11/29/2021 No        Mononeuropathy (Chronic) ICD-10-CM: G58.9  ICD-9-CM: 355.9  Unknown Yes    Overview Signed 11/28/2021 11:29 AM by Beverley Rosa MD     Involving ring finger of left hand             * (Principal) Multiple closed pelvic fractures without disruption of pelvic ring ([facility] [address] ICD-10-CM: U45.28ZS  ICD-9-CM: 808.44  11/19/2021 Yes        Hyperphosphatemia ICD-10-CM: E83.39  ICD-9-CM: 275.3  11/14/2021 Yes        Anticoagulated by anticoagulation treatment ICD-10-CM: Z79.01  ICD-9-CM: V58.61  Unknown Yes    Overview Signed 11/23/2021  9:49 AM by Beverley Rosa MD     On Apixaban             Paroxysmal atrial fibrillation (HCC) (Chronic) ICD-10-CM: I48.0  ICD-9-CM: 427.31  Unknown Yes        Closed fracture of left inferior pubic ramus, with routine healing, subsequent encounter ICD-10-CM: S32.592D  ICD-9-CM: V54.13  11/12/2021 Yes        Closed nondisplaced fracture of anterior wall of left acetabulum with routine healing ICD-10-CM: S32.415D  ICD-9-CM: V54.19  11/12/2021 Yes        Closed fracture of superior pubic ramus, left, with routine healing, subsequent encounter ICD-10-CM: S32.512D  ICD-9-CM: V54.19  11/12/2021 Yes        History of infection with vancomycin resistant Enterococcus (VRE) ICD-10-CM: E04.23  ICD-9-CM: V12.09  10/8/2021 Yes    Overview Signed 11/23/2021  9:51 AM by Beverley Rosa MD     Urine culture (collected 10/8/2021, resulted 10/14/2021) yielded growth of >100,000 colonies/ml of Enterococcus faecalis RESISTANT to Ciprofloxacin, Levofloxacin, Tetracycline and Vancomycin             History of urinary tract infection ICD-10-CM: Z87.440  ICD-9-CM: V13.02  10/8/2021 Yes    Overview Signed 11/23/2021  9:52 AM by Di Ferris MD     Urine culture (collected 10/8/2021, resulted 10/14/2021) yielded growth of >100,000 colonies/ml of Enterococcus faecalis RESISTANT to Ciprofloxacin, Levofloxacin, Tetracycline and Vancomycin             Need for prophylactic isolation ICD-10-CM: Z41.8  ICD-9-CM: V07.0  10/8/2021 Yes    Overview Signed 11/23/2021  9:52 AM by Di Ferris MD     Urine culture (collected 10/8/2021, resulted 10/14/2021) yielded growth of >100,000 colonies/ml of Enterococcus faecalis RESISTANT to Ciprofloxacin, Levofloxacin, Tetracycline and Vancomycin             End-stage renal disease on hemodialysis (HCC) (Chronic) ICD-10-CM: N18.6, Z99.2  ICD-9-CM: 585.6, V45.11  Unknown Yes    Overview Signed 11/23/2021  9:56 AM by Di Ferris MD     HD at [facility] on MWF.  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 ([facility] [address] (Chronic) ICD-10-CM: E11.22, N18.6  ICD-9-CM: 250.40, 585.6  Unknown Yes    Overview Signed 11/23/2021  9:50 AM by Di Ferris MD     HbA1c (10/8/2021) = 4.6             Chronic hypotension (Chronic) ICD-10-CM: I95.89  ICD-9-CM: 458.1  Unknown Yes    Overview Signed 11/23/2021 10:01 AM by Di Ferris MD     On Midodrine                   Background:   Past Medical History:   Past Medical History:   Diagnosis Date    Anemia in end-stage renal disease ([facility] [address]     Anticoagulated by anticoagulation treatment     On Apixaban    Chronic hypotension     On Midodrine    Chronic pain     Closed fracture of left inferior pubic ramus, with routine healing, subsequent encounter 11/12/2021    Closed fracture of superior pubic ramus, left, with routine healing, subsequent encounter 11/12/2021    Closed nondisplaced fracture of anterior wall of left acetabulum with routine healing 11/12/2021    Depression     End-stage renal disease on hemodialysis ([facility] [address]     HD at [facility] [place] [facility] on MWF.  Tel # [phone]    Gastroesophageal reflux disease     Glaucoma     History of acute pyelonephritis 2/20/2020    History of hydronephrosis 10/5/2021    History of infection with vancomycin resistant Enterococcus (VRE) 10/8/2021    Urine culture (collected 10/8/2021, resulted 10/14/2021) yielded growth of >100,000 colonies/ml of Enterococcus faecalis RESISTANT to Ciprofloxacin, Levofloxacin, Tetracycline and Vancomycin    History of kidney stones     History of recurrent urinary tract infection     History of sepsis 6/18/2021    History of septic shock 10/8/2021    History of urethral stricture     History of urinary tract infection 10/8/2021    Urine culture (collected 10/8/2021, resulted 10/14/2021) yielded growth of >100,000 colonies/ml of Enterococcus faecalis RESISTANT to Ciprofloxacin, Levofloxacin, Tetracycline and Vancomycin    Hyperlipidemia     Hyperphosphatemia 11/14/2021    Hypothyroidism     Lung mass     Mononeuropathy     Involving ring finger of left hand    Need for prophylactic isolation 10/8/2021    Urine culture (collected 10/8/2021, resulted 10/14/2021) yielded growth of >100,000 colonies/ml of Enterococcus faecalis RESISTANT to Ciprofloxacin, Levofloxacin, Tetracycline and Vancomycin    Paroxysmal atrial fibrillation (HCC)     Secondary hyperparathyroidism of renal origin ([facility] [address]     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 ([facility] [address]     HbA1c (10/8/2021) = 4.6    Uric acid nephrolithiasis     Urinary incontinence         Assessment:   Changes in Assessment throughout shift:      Patient has a central line: no  Patient has Cline Cath: no      Last Vitals:     Vitals:    12/01/21 1530 12/01/21 1545 12/01/21 1600 12/01/21 1615   BP: (!) 104/55 (!) 105/53 (!) 116/52 (!) 116/52   Pulse: 69 69 72 72   Resp:       Temp:       TempSrc:       SpO2:       Weight: Height:            PAIN    Pain Assessment    Pain Intensity 1: 0 (12/01/21 1433) Pain Intensity 1: 2 (12/29/14 1105)    Pain Location 1: Back, Leg Pain Location 1: Abdomen    Pain Intervention(s) 1: Medication (see MAR) Pain Intervention(s) 1: Medication (see MAR)  Patient Stated Pain Goal: Unable to verbalize/indicate pain Patient Stated Pain Goal: 0  o Intervention effective: yes  o Other actions taken for pain:       Skin Assessment  Skin color Skin Color: Appropriate for ethnicity  Condition/Temperature Skin Condition/Temp: Dry, Warm  Integrity Skin Integrity: Scars (comment)  Turgor Turgor: Non-tenting  Weekly Pressure Ulcer Documentation  Pressure  Injury Documentation: No Pressure Injury Noted-Pressure Ulcer Prevention Initiated  Wound Prevention & Protection Methods  Orientation of wound Orientation of Wound Prevention: Posterior  Location of Prevention Location of Wound Prevention: Buttocks, Sacrum/Coccyx  Dressing Present Dressing Present : No  Dressing Status    Wound Offloading Wound Offloading (Prevention Methods): Bed, pressure redistribution/air, Pillows, Repositioning, Wheelchair     INTAKE/OUPUT  Date 11/30/21 0700 - 12/01/21 0659 12/01/21 0700 - 12/02/21 0659   Shift 4280-0191 2934-8882 24 Hour Total 8645-5900 9867-5520 24 Hour Total   INTAKE   P.O. 600 120 720 120  120     P. O. 600 120 720 120  120   Shift Total(mL/kg) 600(6.3) 120(1.3) 720(7.6) 120(1.3)  120(1.3)   OUTPUT   Urine(mL/kg/hr)           Urine Occurrence(s) 0 x 0 x 0 x 3 x  3 x   Stool           Stool Occurrence(s) 0 x 0 x 0 x 0 x  0 x   Shift Total(mL/kg)          120 720 120  120   Weight (kg) 95 95 95 95 95 95       Recommendations:  1. Patient needs and requests: None at this time    2. Pending tests/procedures: Routine labs     3. Functional Level/Equipment: Partial (one person) / Bed (comment); [place];  Wheelchair    Fall Precautions:   Fall risk precautions were reinforced with the patient; she was instructed to call for help prior to getting up. The following fall risk precautions were continued: bed/ chair alarms, door signage, yellow bracelet and socks as well as update of the Bertha Maldonado tool in the patient's room. Freddy Score: 4    HEALS Safety Check    A safety check occurred in the patient's room between off going nurse and oncoming nurse listed above. The safety check included the below items  Area Items   H  High Alert Medications - Verify all high alert medication drips (heparin, PCA, etc.)   E  Equipment - Suction is set up for ALL patients (with adelaide)  - Red plugs utilized for all equipment (IV pumps, etc.)  - WOWs wiped down at end of shift.  - Room stocked with oxygen, suction, and other unit-specific supplies   A  Alarms - Bed alarm is set for fall risk patients  - Ensure chair alarm is in place and activated if patient is up in a chair   L  Lines - Check IV for any infiltration  - Cline bag is empty if patient has a Cline   - Tubing and IV bags are labeled   S  Safety   - Room is clean, patient is clean, and equipment is clean. - Hallways are clear from equipment besides carts. - Fall bracelet on for fall risk patients  - Ensure room is clear and free of clutter  - Suction is set up for ALL patients (with adelaide)  - Hallways are clear from equipment besides carts.    - Isolation precautions followed, supplies available outside room, sign posted     Metta Drought

## 2021-12-01 NOTE — PROGRESS NOTES
Problem: Risk for Spread of Infection  Goal: Prevent transmission of infectious organism to others  Description: Prevent the transmission of infectious organisms to other patients, staff members, and visitors. Outcome: Progressing Towards Goal     Problem: Falls - Risk of  Goal: *Absence of Falls  Description: Document Autumn Led Fall Risk and appropriate interventions in the flowsheet.   Outcome: Progressing Towards Goal  Note: Fall Risk Interventions:  Mobility Interventions: Assess mobility with egress test, Bed/chair exit alarm, Communicate number of staff needed for ambulation/transfer, OT consult for ADLs, Patient to call before getting OOB, PT Consult for mobility concerns, PT Consult for assist device competence, Strengthening exercises (ROM-active/passive), Utilize gait belt for transfers/ambulation    Mentation Interventions: Adequate sleep, hydration, pain control, Bed/chair exit alarm, Door open when patient unattended, Evaluate medications/consider consulting pharmacy, Eyeglasses and hearing aids, Family/sitter at bedside, Gait belt with transfers/ambulation, Toileting rounds    Medication Interventions: Bed/chair exit alarm, Evaluate medications/consider consulting pharmacy, Patient to call before getting OOB, Utilize gait belt for transfers/ambulation    Elimination Interventions: Bed/chair exit alarm, Call light in reach, Patient to call for help with toileting needs, Stay With Me (per policy)    History of Falls Interventions: Bed/chair exit alarm, Consult care management for discharge planning, Door open when patient unattended, Evaluate medications/consider consulting pharmacy, Room close to nurse's station, Utilize gait belt for transfer/ambulation         Problem: Pain  Goal: *Control of Pain  Outcome: Progressing Towards Goal

## 2021-12-01 NOTE — DIALYSIS
DAT        ACUTE HEMODIALYSIS FLOW SHEET      HEMODIALYSIS ORDERS: Physician: Tonny Retana     Dialyzer: revaclear   Duration: 3 hr  BFR: 350   DFR: 600   Dialysate:  Temp 36-37*C  K+   2    Ca+  2 Na 138 Bicarb 30   Weight:  95 kg    Patient Chart [x]     Unable to Obtain []   Dry weight/UF Goal: 2000   Access: LUE AVF  Needle Gauge: 15    Heparin []  Bolus      Units    [] Hourly       Units    [x]None       Pre BP:   124/51    Pulse:     52       Respirations: 18  Temperature:   97.5   Labs: Pre        Post:         [x] N/A   Additional Orders(medications, blood products, hypotension management):   hectorol     [x] Dat Consent Verified     CATHETER ACCESS: [x]N/A      GRAFT/FISTULA ACCESS:  []N/A     []Right     [x]Left     [x]UE     []LE   []AVG   [x]AVF        []Buttonhole    [x]Medical Aseptic Prep Utilized   [x]No S/S infection  []Redness  []Drainage []Cultured []Swelling []Pain    Bruit:   [x] Strong    [] Weak       Thrill :   [x] Strong    [] Weak       Needle Gauge: 15   Length: 1   If access problem,  notified: []Yes     [x]N/A    Please describe access if present and not used:                            GENERAL ASSESSMENT:      LUNGS:  Rate 18 SaO2%        [] N/A    [x] Clear  [] Coarse  [] Crackles  [] Wheezing        [] Diminished     Location : []RLL   []LLL    []RUL  []LALI     Cough: []Productive  []Dry  [x]N/A   Respirations:  [x]Easy  []Labored     Therapy:   [x]RA  []NC  l/min    Mask: []NRB []Venti       O2%                  []Ventilator  []Intubated  [] Trach  [] BiPaP     CARDIAC: [x]Regular      [] Irregular   [] Pericardial Rub  [] JVD        []  Monitored  [] Bedside  [] Remotely monitored [x] N/A        EDEMA: [] None   [x]Generalized  [] Pitting [] 1    [] 2    [] 3    [] 4                 [] Facial  [] Pedal  []  UE  [] LE     SKIN:   [x] Warm   [] Hot     [] Cold   [x] Dry     [] Pale   [] Diaphoretic                  [] Flushed  [] Jaundiced  [] Cyanotic  [] Rash  [] Weeping     LOC: [x] Alert      [x]Oriented:    [x] Person     [x] Place  [x]Time               [] Confused  [] Lethargic  [] Medicated  [] Non-responsive     GI / ABDOMEN:  [] Flat    [] Distended    [x] Soft    [] Firm   []  Obese                             [] Diarrhea  [x] Bowel Sounds  [] Nausea  [] Vomiting       / URINE ASSESSMENT:[] Voiding   [x] Oliguria  [] Anuria   []  Cline     [] Incontinent    []  Incontinent Brief      []  Bathroom Privileges       PAIN: [x] 0 []1  []2   []3   []4   []5   []6   []7   []8   []9   []10              Scale 0-10  Action/Follow Up:      MOBILITY:  [] Amb    [] Amb/Assist    [x] Bed    [] Wheelchair  [] Stretcher      All Vitals and Treatment Details on Attached [address]: [facility]          Room # 178/01      [] 1st Time Acute  [] Stat  [x] Routine  [] Urgent     [x] Acute Room  []  Bedside  [] ICU/CCU  [] ER   Isolation Precautions:  Contact      Special Considerations:         [] Blood Consent Verified [x]N/A      ALLERGIES:   Allergies   Allergen Reactions    Albumin, Human 25 % Itching     Headache - severe migraine like, itchy eyes, runny nose    Ciprofloxacin Hives    Cyclopentolate Unknown (comments)    Iron Sucrose Diarrhea    Statins-Hmg-Coa Reductase Inhibitors Other (comments)     Body ache               Code Status:DNR        Hepatitis Status:                        Lab Results   Component Value Date/Time    Hepatitis B surface Ag <0.10 11/17/2021 02:20 PM    Hepatitis B surface Ab 29.33 11/17/2021 02:20 PM                     Current Labs:   Lab Results   Component Value Date/Time    Sodium 133 (L) 12/01/2021 06:22 AM    Potassium 5.9 (H) 12/01/2021 06:22 AM    Chloride 99 (L) 12/01/2021 06:22 AM    CO2 25 12/01/2021 06:22 AM    Anion gap 9 12/01/2021 06:22 AM    Glucose 83 12/01/2021 06:22 AM    BUN 52 (H) 12/01/2021 06:22 AM    Creatinine 6.51 (H) 12/01/2021 06:22 AM    BUN/Creatinine ratio 8 (L) 12/01/2021 06:22 AM    GFR est AA 7 (L) 12/01/2021 06:22 AM GFR est non-AA 6 (L) 12/01/2021 06:22 AM    Calcium 9.1 12/01/2021 06:22 AM      Lab Results   Component Value Date/Time    WBC 5.7 12/01/2021 06:22 AM    Hemoglobin, POC 8.8 (L) 09/24/2020 08:45 AM    HGB 8.8 (L) 12/01/2021 06:22 AM    Hematocrit, POC 26 (L) 09/24/2020 08:45 AM    HCT 28.1 (L) 12/01/2021 06:22 AM    PLATELET 432 09/82/8256 06:22 AM    MCV 99.6 12/01/2021 06:22 AM                                                                                     DIET:   DIET ADULT ORAL NUTRITION SUPPLEMENT  DIET ADULT       PRIMARY NURSE REPORT: First initial/Last name/Title      Pre Dialysis: NICOLE Truong RN     Time: 9028      EDUCATION:    [x] Patient [] Other         Knowledge Basis: []None [x]Minimal [] Substantial   Barriers to learning  [x]N/A   [] Access Care     [] S&S of infection     [] Fluid Management     []K+     [x]Procedural    []Albumin     [] Medications     [] Tx Options     [] Transplant     [] Diet     [] Other   Teaching Tools:  [x] Explain  [x] Demo  [] Handouts [] Video  Patient response:  [x] Verbalized understanding  [] Teach back  [] Return demonstration [] Requires follow up   Inappropriate due to:            [x]Time Out/Safety Check  [x]Extracorporeal Circuit Tested for integrity       [address] - Before each treatment:     Machine Number:                   [facility]                                  [x] Unit Machine # 6 with centralized RO                                   Alarm Test:  Pass time 1420               [x] RO/Machine Log Complete      Temp   36             Dialysate: pH  7.6 Conductivity: Meter   13.6     HD Machine   13.4                  TCD: 13.6  Dialyzer Lot # [id number]            Blood Tubing Lot # [id number]          Saline Lot #  [id number]     CHLORINE TESTING-Before each treatment and every 4 hours    Total Chlorine: [x] less than 0.1 ppm  Time: 1300 4 Hr/2nd Check Time: 1700   (if greater than 0.1 ppm from Primary then every 30 minutes from Secondary)     TREATMENT INITIATION - with Dialysis Precautions:   [x] All Connections Secured                 [x] Saline Line Double Clamped   [x] Venous Parameters Set                  [x] Arterial Parameters Set    [x] Prime Given 250ml                          [x]Air Foam Detector Engaged      Treatment Initiation Note:   Pt arrived to HD unit via bed. A&Ox4 in NAD on RA.  LUE AVF assessed with strong bruit and thrill. HD initiated without difficulty with 15g x2. During Treatment Notes:  1500 Pt awake and alert. Face and access in view with connections secure. 1515 Pt awake and alert. Face and access in view with connections secure. 1530 Pt awake and alert. Face and access in view with connections secure. 1545 Pt awake and alert. Face and access in view with connections secure. 1600 Pt awake and alert. Face and access in view with connections secure. 1615 Pt awake and alert. Face and access in view with connections secure. 1630 Pt awake and alert. Face and access in view with connections secure. 1645 Pt awake and alert. Face and access in view with connections secure. 1700 Pt awake and alert. Face and access in view with connections secure. [id number] Pt awake and alert. Face and access in view with connections secure. 1730 Pt awake and alert. Face and access in view with connections secure. 1745 Pt awake and alert. Face and access in view with connections secure. Medication Dose Volume Route Time [facility] name Title   hectorol 4 mcg 2 ml dialysis 1528 P Malia BRADY   tylenol 650 mg 2 tabs PO 1712 P Malia BRADY                   Post Assessment:   Dialyzer Cleared: [] Good [x] Fair  [] Poor  Blood processed:  60.3 L  UF Removed  2500 mL  POst BP:   109/58       Pulse: 85        Respirations: 18  Temperature: 98.1 Lungs:     [x] Clear      [] Course         [] Crackles    [] Wheezing         [] Diminished   Post Tx Vascular Access:   AVF/AVG: Bleeding stopped   Art 10 min. Jasper. 10 Min    Cardiac:   [x] Regular   [] Irregular   [] Monitor  [x] N/A       Catheter:     N/A    Skin:   Pain:   [x] Warm  [x] Dry [] Diaphoretic    [] Flushed    [] Pale [] Cyanotic [x]0  []1  []2   []3  []4   []5   []6   []7   []8   []9   []10     Post Treatment Note:  Pt tolerated treatment well. Net 2 L UF removed. POST TREATMENT PRIMARY NURSE HANDOFF REPORT:     First initial/Last name/Title         Post Dialysis: NICOLE Pelaez RN      Time:  2207     Abbreviations: AVG-arterial venous graft, AVF-arterial venous fistula, IJ-Internal Jugular, Subcl-Subclavian, Fem-Femoral, Tx-treatment, AP/HR-apical heart rate, DFR-dialysate flow rate, BFR-blood flow rate, AP-arterial pressure, -venous pressure, UF-ultrafiltrate, TMP-transmembrane pressure, Jasper-Venous, Art-Arterial, RO-Reverse Osmosis

## 2021-12-01 NOTE — PROGRESS NOTES
RENAL DAILY PROGRESS NOTE            63y F with PMH ESRD admitted in rehab after pelvic fracture, following for ESRD management   Subjective:       Complaint:   Overnight events noted    IMPRESSION:   ESRD, MWF  Access; left arm fistula   Anemia , on epogen   H/o chronic UTI h/o right ureteral stent  Close pelvic fracture   Atrial fibrilation    PLAN:   HD today with 2K bath UF goal 2L as tolerated. Start on lokelma 5mg daily .         Discussed with Dr. Deborah Ramsay        Current Facility-Administered Medications   Medication Dose Route Frequency    sodium zirconium cyclosilicate (LOKELMA) powder packet 5 g  5 g Oral DAILY    sevelamer carbonate (RENVELA) tab 1,600 mg  1,600 mg Oral TID WITH MEALS    lidocaine-prilocaine (EMLA) 2.5-2.5 % cream   Topical BID    midodrine (PROAMATINE) tablet 5 mg  5 mg Oral TID WITH MEALS    allopurinoL (ZYLOPRIM) tablet 100 mg  100 mg Oral Q MON, WED & FRI    epoetin caitlin-epbx (RETACRIT) injection 8,000 Units  8,000 Units SubCUTAneous Q MON, WED & FRI    cetirizine (ZYRTEC) tablet 10 mg  10 mg Oral DAILY    acetaminophen (TYLENOL) tablet 650 mg  650 mg Oral Q4H PRN    bisacodyL (DULCOLAX) tablet 10 mg  10 mg Oral Q48H PRN    amiodarone (CORDARONE) tablet 200 mg  200 mg Oral DAILY    acetaminophen (TYLENOL) tablet 650 mg  650 mg Oral TID    apixaban (ELIQUIS) tablet 5 mg  5 mg Oral BID    HYDROmorphone (DILAUDID) tablet 1 mg  1 mg Oral Q8H PRN    meclizine (ANTIVERT) tablet 12.5 mg  12.5 mg Oral TID PRN    DULoxetine (CYMBALTA) capsule 20 mg  20 mg Oral DAILY    vitamin B complex-folic acid 0.4 mg tablet  1 Tablet Oral DAILY    cyanocobalamin tablet 1,000 mcg  1,000 mcg Oral DAILY    L. acidophilus,casei,rhamnosus (BIO-K PLUS) capsule 1 Capsule  1 Capsule Oral DAILY    ascorbic acid (vitamin C) (VITAMIN C) tablet 500 mg  500 mg Oral DAILY    cholecalciferol (VITAMIN D3) (1000 Units /25 mcg) tablet 2,000 Units  2,000 Units Oral BID    latanoprost (XALATAN) 0.005 % ophthalmic solution 1 Drop  1 Drop Both Eyes QPM    levothyroxine (SYNTHROID) tablet 125 mcg  125 mcg Oral 6am    pantoprazole (PROTONIX) tablet 20 mg  20 mg Oral ACB    ondansetron (ZOFRAN ODT) tablet 4 mg  4 mg Oral TID PRN    lidocaine 4 % patch 1 Patch  1 Patch TransDERmal Q24H    gabapentin (NEURONTIN) capsule 200 mg  200 mg Oral Q MON, WED & FRI    doxercalciferoL (HECTOROL) 4 mcg/2 mL injection 4 mcg  4 mcg IntraVENous DIALYSIS MON, WED & FRI       Review of Symptoms: comprehensive ROS negative except above.    Objective:     Patient Vitals for the past 24 hrs:   Temp Pulse Resp BP SpO2   12/01/21 1225 -- 68 -- (!) 114/59 --   12/01/21 0913 -- (!) 56 -- (!) 118/53 --   12/01/21 0710 98.3 °F (36.8 °C) 68 18 (!) 110/58 98 %   11/30/21 2104 96.8 °F (36 °C) 84 20 (!) 115/45 98 %   11/30/21 1543 97 °F (36.1 °C) 73 16 118/65 96 %        Weight change:      11/29 1901 - 12/01 0700  In: 840 [P.O.:840]  Out: -     Intake/Output Summary (Last 24 hours) at 12/1/2021 1406  Last data filed at 12/1/2021 0928  Gross per 24 hour   Intake 720 ml   Output --   Net 720 ml     Physical Exam:   General: comfortable, no acute distress   HEENT sclera anicteric, supple neck, no thyromegaly  CVS: S1S2 heard,  no rub  RS: + air entry b/l,   Abd: Soft, Non tender,  Neuro: non focal, awake, alert , CN II-XII are grossly intact  Extrm: edema, no cyanosis, clubbing   Skin: no visible  Rash  Musculoskeletal: No gross joints or bone deformities         Data Review:     LABS:   Hematology:   Recent Labs     12/01/21 0622 11/29/21  0539   WBC 5.7 8.1   HGB 8.8* 9.0*   HCT 28.1* 29.7*     Chemistry:   Recent Labs     12/01/21 0622 11/29/21  1008   BUN 52* 74*   CREA 6.51* 7.68*   CA 9.1 9.9   ALB 3.0* 3.7   K 5.9* 6.2*   * 135*   CL 99* 101   CO2 25 20*   PHOS 7.2* 8.4*   GLU 83 138*            Procedures/imaging: see electronic medical records for all procedures, Xrays and details which were not copied into this note but were reviewed prior to creation of Plan          Assessment & Plan:     As above         Jace Mullins MD  12/1/2021  2:33 PM

## 2021-12-01 NOTE — PROGRESS NOTES
Problem: Mobility Impaired (Adult and Pediatric)  Goal: *Therapy Goal (Edit Goal, Insert Text)  Description: Physical Therapy Short Term Goals  Initiated 11/20/2021, updated 11/29/2021, and to be accomplished within 7 day(s) on 12/06/2021  1. Patient will move from supine to sit and sit to supine , scoot up and down, and roll side to side in bed with supervision/set-up. MET 11/29/2021   Updated Goal: Patient will move from supine to sit and sit to supine , scoot up and down, and roll side to side in bed with modified independence. 2.  Patient will transfer from bed to chair and chair to bed with moderate assistance  using the least restrictive device, consistently maintaining TTWB left LE. MET with PWB Left LE 11/29/2021   Updated Goal: Patient will transfer from bed to chair and chair to bed with distant supervision using the least restrictive device, consistently maintaining PWB left LE. 3.  Patient will perform sit to stand with minimal to moderate assistance without additional assistance left LE to consistently maintain TTWB. MET with PWB Left LE 11/29/2021   Updated Goal: Patient will perform sit to stand consistently at distant supervision maintaining PWB. 4.  Patient will perform w/c mobility SBA level over even surfaces for at least 200 ft before fatiguing. MET 11/29/2021  5. Patient will be able to perform static standing for at least 2 minute duration with 1-2 UE support before needing seated rest, maintaining TTWB left LE appropriately. MET with PWB Left LE 11/29/2021    Updated Goal:  Patient will negotiate 4 (6\") steps with B UE support on handrails with supervision maintaining left LE PWB. Physical Therapy Long Term Goals  Initiated 11/20/2021 and to be accomplished within 28 day(s) 12/18/2021  1. Patient will move from supine to sit and sit to supine , scoot up and down, and roll side to side in bed with modified independence.     2.  Patient will transfer from bed to chair and chair to bed with modified independence using the least restrictive device. 3.  Patient will perform sit to stand with modified independence. 4.  Patient will ambulate 150 feet with RW maintaining left LE PWB with distant supervision. 5.  Patient will ascend/descend 4 (6\") steps with B UE support on handrails with distant supervision. 5.  Patient will perform w/c mobility mod I over even surfaces for at least 200 ft  6. Patient will negotiate w/c ramp with distant supervision. Outcome: Progressing Towards Goal    PHYSICAL THERAPY TREATMENT    Patient: Aashish Dang [facility]64 y.o. female)  Date: 2021  Diagnosis: Multiple closed pelvic fractures without disruption of pelvic ring (HCC) [S32.82XA] Multiple closed pelvic fractures without disruption of pelvic ring (HCC)  Precautions: Contact, Fall, Skin, PWB (50% Left LE)  Chart, physical therapy assessment, plan of care and goals were reviewed. Time IJ:9027  Time Out:1030    Patient seen for: Balance activities; Gait training; Patient education; Transfer training; Wheelchair mobility    Time In:1106  Time Out:1221     Patient seen for: Balance activities; Gait training; Patient education; Transfer training; Wheelchair mobility; Therapeutic exercises    Pain:  Pt pain was reported as \"it's alright,\" pre-treatment. Pt pain was reported as increased at times during treatment with stair negotiation. Intervention: Pt offered rest breaks throughout as needed. Patient identified with name and : yes    SUBJECTIVE:      Pt is pleasant and cooperative during both treatment sessions but notes increased left LE pain with ascending/descending stairs requesting, \"can I try going forward? \" and reiterating that she usually utilizes B handrails to enter her home. OBJECTIVE DATA SUMMARY:    Objective:     TRANSFERS Daily Assessment     Transfer Type:  Other  Other: stand step with RW  Transfer Assistance : 6 (Modified independent)  Sit to Stand Assistance: Modified independent   Pt performs functional transfers utilizing B UEs to assist to push to stand and for slow controlled stand to sit. She performed 2 sets of 10 repetitions sit <> stand w/c <> RW to promote B LE functional strengthening with verbal cues for quality. GAIT Daily Assessment    Gait Description (WDL) Exceptions to WDL    Gait Abnormalities Decreased step clearance    Assistive device Gait belt; Walker, rolling    Ambulation assistance - level surface 6 (Modified independent)    Distance 244 Feet x 1 trial during first treatment session, 150 Feet x 1 trial during second treatment session and 2 x 8 Feet to and from practice steps    Ambulation assistance- uneven surface  Distant Supervision for safety x 120 Feet outdoors over concrete sidewalk of varying grades    Comments Pt ambulates with forward flexed posture and partial step through pattern with decreased left stance time and weight shift maintaining left LE PWB. STEPS/STAIRS Daily Assessment     Steps/Stairs ambulated 4 (6\")    Assistance Required 5 (Supervision/setup) (close supervision)    Rail Use Left  (for 2 steps, Bilateral for 2 steps)    Comments   Pt requires close supervision for safety secondary to left LE pain and functional weakness. Pt ascended/descended 2 steps side stepping and then utilized B handrails for ascending/descending 2 more steps at a supervision level with step to step pattern. Curbs/Ramps  NT        BALANCE Daily Assessment     Sitting - Static: Good (unsupported)  Sitting - Dynamic: Good (unsupported)  Standing - Static: Fair  Standing - Dynamic : Impaired        WHEELCHAIR MOBILITY Daily Assessment     Pt propels w/c on unit with modified independence. THERAPEUTIC EXERCISES Daily Assessment     Seated LE Strength Training:  3 Sets of 10 Repetitions:   Alternate Right and Left Hip Flexion  Right and Left LAQ  Isometric Hip Add x 5\" Holds  Red Theraband resisted Hip Abd/Add        ASSESSMENT:  Pt is progressing well with functional mobility demonstrating ability to perform functional transfers and ambulate with RW at a modified independence level. Pt reports ongoing left LE pain and requires cuing for quality of movement and will benefit from ongoing training and opportunity for family education to promote safe d/c home. Progression toward goals:  []      Improving appropriately and progressing toward goals  [x]      Improving slowly and progressing toward goals  []      Not making progress toward goals and plan of care will be adjusted      PLAN:  Patient continues to benefit from skilled intervention to address the above impairments. Continue treatment per established plan of care. Emphasize functional strengthening to promote safe d/c home. Discharge Recommendations:  Home Health Physical Therapy to progress to Outpatient Physical Therapy with supplemental assistance at home. Further Equipment Recommendations for Discharge:  rolling walker and N/A      Estimated Discharge Date: 12/13/2021    Activity Tolerance:   Fair  Please refer to the flowsheet for vital signs taken during this treatment.     After treatment:   [] Patient left in no apparent distress in bed  [] Patient left in no apparent distress sitting up in chair  [x] Patient left in no apparent distress in w/c mobilizing under own power  [] Patient left in no apparent distress dining area  [] Patient left in no apparent distress mobilizing under own power  [] Call bell left within reach  [] Nursing notified  [] Caregiver present  [] Bed alarm activated   [x] Chair alarm activated      Cathy León, PT, DPT  12/1/2021

## 2021-12-01 NOTE — PROGRESS NOTES
[facility] PHYSICAL REHABILITATION  [address] [place] [address], [address]     INPATIENT REHABILITATION  DAILY PROGRESS NOTE     Date: 12/1/2021    Name: Roya Chang Age / Sex: 64 y.o. / female   CSN: [id number] MRN: [id number]   [facility] Date: 11/19/2021 Length of Stay: 12 days     Primary Rehab Diagnosis: Impaired Mobility and ADLs secondary to Multiple closed pelvic fractures (comminuted fracture involving the left anterior acetabular wall with involvement of the base of the left superior pubic ramus which are not significantly displaced; nondisplaced left inferior pubic ramus fracture)      Subjective:     Patient seen and examined. Blood pressure controlled. Patient's Complaint:   Urinary problems (blood reported in urine)    Pain Control: stable, mild-to-moderate joint symptoms intermittently, reasonably well controlled by current meds      Objective:     Vital Signs:  Patient Vitals for the past 24 hrs:   BP Temp Pulse Resp SpO2   12/01/21 1225 (!) 114/59 -- 68 -- --   12/01/21 0913 (!) 118/53 -- (!) 56 -- --   12/01/21 0710 (!) 110/58 98.3 °F (36.8 °C) 68 18 98 %   11/30/21 2104 (!) 115/45 96.8 °F (36 °C) 84 20 98 %   11/30/21 1543 118/65 97 °F (36.1 °C) 73 16 96 %        Physical Examination:  GENERAL SURVEY: Patient is awake, alert, oriented x 3, sitting comfortably on the chair, not in acute respiratory distress.   HEENT: pink palpebral conjunctivae, anicteric sclerae, no nasoaural discharge, moist oral mucosa  NECK: supple, no jugular venous distention, no palpable lymph nodes  CHEST/LUNGS: symmetrical chest expansion, good air entry, clear breath sounds  HEART: adynamic precordium, good S1 S2, no S3, regular rhythm, no murmurs  ABDOMEN: obese, bowel sounds appreciated, soft, non-tender  EXTREMITIES: pink nailbeds, no edema, full and equal pulses, no calf tenderness   NEUROLOGICAL EXAM: The patient is awake, alert and oriented x3, able to answer questions fairly appropriately, able to follow 1 and 2 step commands. Able to tell time from the wall clock. Cranial nerves II-XII are grossly intact. No gross sensory deficit. Motor strength is 4 to 4+/5 on BUE and RLE, 2+ to 3-/5 on the left hip, 3 to 3+/5 on the left knee and left ankle.        Current Medications:  Current Facility-Administered Medications   Medication Dose Route Frequency    sodium zirconium cyclosilicate (LOKELMA) powder packet 5 g  5 g Oral DAILY    sevelamer carbonate (RENVELA) tab 1,600 mg  1,600 mg Oral TID WITH MEALS    lidocaine-prilocaine (EMLA) 2.5-2.5 % cream   Topical BID    midodrine (PROAMATINE) tablet 5 mg  5 mg Oral TID WITH MEALS    allopurinoL (ZYLOPRIM) tablet 100 mg  100 mg Oral Q MON, WED & FRI    epoetin caitlin-epbx (RETACRIT) injection 8,000 Units  8,000 Units SubCUTAneous Q MON, WED & FRI    cetirizine (ZYRTEC) tablet 10 mg  10 mg Oral DAILY    acetaminophen (TYLENOL) tablet 650 mg  650 mg Oral Q4H PRN    bisacodyL (DULCOLAX) tablet 10 mg  10 mg Oral Q48H PRN    amiodarone (CORDARONE) tablet 200 mg  200 mg Oral DAILY    acetaminophen (TYLENOL) tablet 650 mg  650 mg Oral TID    apixaban (ELIQUIS) tablet 5 mg  5 mg Oral BID    HYDROmorphone (DILAUDID) tablet 1 mg  1 mg Oral Q8H PRN    meclizine (ANTIVERT) tablet 12.5 mg  12.5 mg Oral TID PRN    DULoxetine (CYMBALTA) capsule 20 mg  20 mg Oral DAILY    vitamin B complex-folic acid 0.4 mg tablet  1 Tablet Oral DAILY    cyanocobalamin tablet 1,000 mcg  1,000 mcg Oral DAILY    L. acidophilus,casei,rhamnosus (BIO-K PLUS) capsule 1 Capsule  1 Capsule Oral DAILY    ascorbic acid (vitamin C) (VITAMIN C) tablet 500 mg  500 mg Oral DAILY    cholecalciferol (VITAMIN D3) (1000 Units /25 mcg) tablet 2,000 Units  2,000 Units Oral BID    latanoprost (XALATAN) 0.005 % ophthalmic solution 1 Drop  1 Drop Both Eyes QPM    levothyroxine (SYNTHROID) tablet 125 mcg  125 mcg Oral 6am    pantoprazole (PROTONIX) tablet 20 mg  20 mg Oral ACB    ondansetron (ZOFRAN ODT) tablet 4 mg  4 mg Oral TID PRN    lidocaine 4 % patch 1 Patch  1 Patch TransDERmal Q24H    gabapentin (NEURONTIN) capsule 200 mg  200 mg Oral Q MON, WED & FRI    doxercalciferoL (HECTOROL) 4 mcg/2 mL injection 4 mcg  4 mcg IntraVENous DIALYSIS MON, WED & FRI       Allergies:   Allergies   Allergen Reactions    Albumin, Human 25 % Itching     Headache - severe migraine like, itchy eyes, runny nose    Ciprofloxacin Hives    Cyclopentolate Unknown (comments)    Iron Sucrose Diarrhea    Statins-Hmg-Coa Reductase Inhibitors Other (comments)     Body ache       Functional Progress:    OCCUPATIONAL THERAPY    ON ADMISSION MOST RECENT   Eating  Functional Level: 5   Eating  Functional Level: 7     Grooming  Functional Level: 5   Grooming  Functional Level: 5     Bathing  Functional Level: 3   Bathing  Functional Level: 3     Upper Body Dressing  Functional Level: 4   Upper Body Dressing  Functional Level: 4     Lower Body Dressing  Functional Level: 3   Lower Body Dressing  Functional Level: 3     Toileting  Functional Level: 3   Toileting  Functional Level: 6     Toilet Transfers  Toilet Transfer Score: 4   Toilet Transfers  Toilet Transfer Score: 4     Tub /Shower Transfers  Tub/Shower Transfer Score: 4   Tub/Shower Transfers  Tub/Shower Transfer Score: 4       Legend:   7 - Independent   6 - Modified Independent   5 - Standby Assistance / Supervision / Set-up   4 - Minimum Assistance / Contact Guard Assistance   3 - Moderate Assistance   2 - Maximum Assistance   1 - Total Assistance / Dependent       Lab/Data Review:  Recent Results (from the past 24 hour(s))   CBC WITH AUTOMATED DIFF    Collection Time: 12/01/21  6:22 AM   Result Value Ref Range    WBC 5.7 4.6 - 13.2 K/uL    RBC 2.82 (L) 4.20 - 5.30 M/uL    HGB 8.8 (L) 12.0 - 16.0 g/dL    HCT 28.1 (L) 35.0 - 45.0 %    MCV 99.6 78.0 - 100.0 FL    MCH 31.2 24.0 - 34.0 PG    MCHC 31.3 31.0 - 37.0 g/dL    RDW 16.9 (H) 11.6 - 14.5 %    PLATELET 421 180 - 156 K/uL MPV 9.4 9.2 - 11.8 FL    NRBC 0.0 0  WBC    ABSOLUTE NRBC 0.00 0.00 - 0.01 K/uL    NEUTROPHILS 47 40 - 73 %    LYMPHOCYTES 36 21 - 52 %    MONOCYTES 13 (H) 3 - 10 %    EOSINOPHILS 2 0 - 5 %    BASOPHILS 1 0 - 2 %    IMMATURE GRANULOCYTES 1 (H) 0.0 - 0.5 %    ABS. NEUTROPHILS 2.7 1.8 - 8.0 K/UL    ABS. LYMPHOCYTES 2.0 0.9 - 3.6 K/UL    ABS. MONOCYTES 0.8 0.05 - 1.2 K/UL    ABS. EOSINOPHILS 0.1 0.0 - 0.4 K/UL    ABS. BASOPHILS 0.1 0.0 - 0.1 K/UL    ABS. IMM. GRANS. 0.1 (H) 0.00 - 0.04 K/UL    DF AUTOMATED     RENAL FUNCTION PANEL    Collection Time: 12/01/21  6:22 AM   Result Value Ref Range    Sodium 133 (L) 136 - 145 mmol/L    Potassium 5.9 (H) 3.5 - 5.5 mmol/L    Chloride 99 (L) 100 - 111 mmol/L    CO2 25 21 - 32 mmol/L    Anion gap 9 3.0 - 18 mmol/L    Glucose 83 74 - 99 mg/dL    BUN 52 (H) 7.0 - 18 MG/DL    Creatinine 6.51 (H) 0.6 - 1.3 MG/DL    BUN/Creatinine ratio 8 (L) 12 - 20      GFR est AA 7 (L) >60 ml/min/1.73m2    GFR est non-AA 6 (L) >60 ml/min/1.73m2    Calcium 9.1 8.5 - 10.1 MG/DL    Phosphorus 7.2 (H) 2.5 - 4.9 MG/DL    Albumin 3.0 (L) 3.4 - 5.0 g/dL         Assessment:     Primary Rehabilitation Diagnosis  1.  Impaired Mobility and ADLs  2. 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    Secondary hyperparathyroidism of renal origin ([facility] [address] N25.81    Gastroesophageal reflux disease K21.9    History of recurrent urinary tract infection Z87.440    History of sepsis Z86.19    End-stage renal disease on hemodialysis ([facility] [address] N18.6, Z99.2    History of hydronephrosis Z87.448    History of septic shock Z86.19    Anticoagulated by anticoagulation treatment Z79.01    Paroxysmal atrial fibrillation (HCC) I48.0    Closed fracture of left inferior pubic ramus, with routine healing, subsequent encounter S32.592D    Closed nondisplaced fracture of anterior wall of left acetabulum with routine healing S32.415D    Closed fracture of superior pubic ramus, left, with routine healing, subsequent encounter S32.512D    Multiple closed pelvic fractures without disruption of pelvic ring ([facility] [address] S32.82XA    Depression F32. A    History of urinary tract infection Z87.440    Need for prophylactic isolation Z41.8    Hyperlipidemia E78.5    Hypothyroidism E03.9    History of kidney stones Z87.442    Chronic pain G89.29    Lung mass R91.8    History of urethral stricture Z87.448    Uric acid nephrolithiasis N20.0    Urinary incontinence R32    Glaucoma H40.9    Hyperphosphatemia E83.39    Mononeuropathy G58.9    Hyperkalemia E87.5       Plan:     1. Justification for continued stay: Good progression towards established rehabilitation goals. 2. Medical Issues being followed closely:    [x]  Fall and safety precautions     []  Wound Care     [x]  Bowel and Bladder Function     [x]  Fluid Electrolyte and Nutrition Balance     [x]  Pain Control      3. Issues that 24 hour rehabilitation nursing is following:    [x]  Fall and safety precautions     []  Wound Care     [x]  Bowel and Bladder Function     [x]  Fluid Electrolyte and Nutrition Balance     [x]  Pain Control      [x]  Assistance with and education on in-room safety with transfers to and from the bed, wheelchair, toilet and shower. 4. Acute rehabilitation plan of care:    [x]  Continue current care and rehab.            [x]  Physical Therapy           [x]  Occupational Therapy           []  Speech Therapy []  Hold Rehab until further notice     5. Medications:    [x]  MAR Reviewed     [x]  Continue Present Medications     6. DVT Prophylaxis:      []  Enoxaparin     []  Unfractionated Heparin     []  Warfarin     [x]  NOAC     []  AMELIE Stockings     []  Sequential Compression Device     []  None     7. Code status    []  Full code     []  Partial code     []  Do not intubate     [x]  Do not resuscitate     8.  Orders:   > Multiple closed pelvic fractures (comminuted fracture involving the left anterior acetabular wall with involvement of the base of the left superior pubic ramus which are not significantly displaced; nondisplaced left inferior pubic ramus fracture)   > Orthopedic surgery recommending non-surgical management with PT, pain control   > Partial (50%) weightbearing on the left lower extremity     > Urinary tract infection, History of right ureteral stent   > Urine culture (collected 11/16/2021, resulted 11/20/2021) yielded growth of >100,000 colonies/ml of Proteus mirabilis RESISTANT to Nitrofurantoin   > Started on IV ceftriaxone and transitioned to cefpodoxime   > On discharge, patient is to follow up with Urology (Dr. Dean Reed)   > On 11/25/2021, patient had completed the recommended treatment course of Cefpodoxime 200 mg PO q 24 hr     > History of VRE urinary tract infection, 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)   > Urine culture (collected 10/8/2021, resulted 10/14/2021) yielded growth of >100,000 colonies/ml of Enterococcus faecalis RESISTANT to Ciprofloxacin, Levofloxacin, Tetracycline and Vancomycin   > Contact isolation    > Paroxysmal atrial fibrillation, anticoagulated on Apixaban   > Anticoagulation    > Continue Apixaban 5 mg PO BID    > Rate-control    > None   > Maintenance of sinus rhythm    > Continue Amiodarone 200 mg PO once daily    > Glaucoma    > Continue Latanoprost 0.005% ophthalmic solution, 1 drop into each ete q PM    > Hypothyroidism   > TSH (11/13/2021) = 11.4   > TSH (11/22/2021) = 1.69   > Free T4 (11/22/2021) = 1.1   > Continue Levothyroxine 125 mcg PO once daily     > Chronic hypotension   > On 11/23/2021, decreased Midodrine from 10 mg to 5 mg PO TID with meals   > Continue Midodrine 5 mg PO TID with meals     > End-stage renal disease, on hemodialysis (Mon-Wed-Fri); Secondary hyperparathyroidism of renal origin   > Nephrology consult (Dr. Mario Rincon) called for evaluation of renal status and to arrange for hemodialysis while patient is in the ARU   > Continue:    > Ascorbic acid 500 mg PO once daily     > Cyanocobalamin 1,000 mcg PO once daily    > Vitamin B complex 1 tab PO once daily     > Doxercalciferol 4 mcg IV q Mon-Wed-Fri    > Epoetin caitlin 8,000 units SC q Mon-Wed-Fri     > 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, diet-controlled   > HbA1c (10/8/2021) = 4.6   > No need for blood glucose monitoring     > Depression   > Continue Duloxetine 20 mg PO once daily     > ? Allergy to albumin   > Will list as allergy per pt and daughter-in-law request.  Start antihistamine.   Monitor.    > Hyperkalemia      11/24/21  0539 11/22/21  0527 11/19/21  0124 11/18/21  0143 11/17/21  0145 11/16/21  0232 11/15/21  0216 11/14/21  0424 11/13/21  0445 11/12/21  1520   K 5.7* 5.4 4.8 4.3 4.7 4.6 4.8 4.4 4.7 3.5      > On 11/24/2021, patient was given Sodium zirconium cyclosilicate 10 grams PO x 1 dose      11/29/21  1008 11/26/21  0635   K 6.2* 5.1      > On 11/29/2021, patient was given Sodium zirconium cyclosilicate 10 grams PO x 1 dose    > K (12/1/2021) = 5.9   > Start Sodium zirconium cyclosilicate 5 grams PO once daily    > Hyperphosphatemia      11/26/21  0635 11/24/21  0539 11/22/21  0527 11/14/21  0424   PHOS 7.2* 8.1* 8.1* 6.0*      > On 11/26/2021, started Calcium acetate 667 mg PO TID with meals   > P (11/29/2021) = 8.4   > On 11/29/2021, increased Calcium acetate from 667 mg to 1,334 mg PO TID with meals --> changed to Sevelamer 1600 mg PO TID with meals   > P (12/1/2021) = 7.2   > Continue Sevelamer 1600 mg PO TID with meals    > 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   > On 11/28/2021, started Lidocaine-Prilocaine 2.5-2.5% cream, applied to palmar surface of ring finger of left hand BID   > Continue:    > Duloxetine 20 mg PO once daily    > Gabapentin 200 mg PO q Mon-Wed-Fri    > Lidocaine-Prilocaine 2.5-2.5% cream, applied to palmar surface of ring finger of left hand BID    > Analgesia / Chronic low back pain   > Continue:    > Acetaminophen 650 mg PO TID (8AM, 12PM, 4PM)    > Acetaminophen 650 mg PO q 4 hr PRN for pain level 4/10 or lesser (from 8PM to 4AM only)    > Lidocaine 4% patch, 1 patch on affected area q 24 hr (12 hr on, 12 hr off)    > Hydromorphone 1 mg PO q 8 hr PRN for pain level 5/10 or greater     > Diet:   > Specifications: 3 carb choices (45 gm/meal); Low fat/Low cholesterol/High fiber/IRINEO; Low potassium (less than 3,000 mg/day); Low phosphorus (less than 1,000 mg/day)   > Solids (consistency): Regular   > Liquids (consistency): Thin   > Fluid restriction: None       9. Personal Protective Equipment (N95 face mask and eye goggles) was used while interacting with the patient. Patient was using a surgical mask. 10. Patient's progress in rehabilitation and medical issues discussed with the patient. All questions answered to the best of my ability. Care plan discussed with patient and nurse. 11. Total clinical care time is 30 minutes, including review of chart including all labs, radiology, past medical history, and discussion with patient. Greater than 50% of my time was spent in coordination of care and counseling.       Signed:    Antonio Bonilla MD    December 1, 2021

## 2021-12-01 NOTE — PROGRESS NOTES
Pt's dc date was updated to 12/3 to home. Pt and son are aware. Pt's son is scheduled for caregiver education on 12/2 @ 930. Sw will follow.

## 2021-12-02 ENCOUNTER — APPOINTMENT (OUTPATIENT)
Dept: ULTRASOUND IMAGING | Age: 76
DRG: 559 | End: 2021-12-02
Attending: INTERNAL MEDICINE
Payer: MEDICARE

## 2021-12-02 PROBLEM — Z74.09 IMPAIRED MOBILITY AND ADLS: Status: ACTIVE | Noted: 2021-11-19

## 2021-12-02 PROBLEM — N30.01 ACUTE CYSTITIS WITH HEMATURIA: Status: ACTIVE | Noted: 2021-01-01

## 2021-12-02 PROBLEM — Z78.9 IMPAIRED MOBILITY AND ADLS: Status: ACTIVE | Noted: 2021-11-19

## 2021-12-02 PROBLEM — R31.0 GROSS HEMATURIA: Status: ACTIVE | Noted: 2021-01-01

## 2021-12-02 LAB
APPEARANCE UR: ABNORMAL
BACTERIA URNS QL MICRO: ABNORMAL /HPF
BILIRUB UR QL: ABNORMAL
COLOR UR: ABNORMAL
EPITH CASTS URNS QL MICRO: ABNORMAL /LPF (ref 0–5)
GLUCOSE UR STRIP.AUTO-MCNC: NEGATIVE MG/DL
HGB UR QL STRIP: ABNORMAL
KETONES UR QL STRIP.AUTO: NEGATIVE MG/DL
LEUKOCYTE ESTERASE UR QL STRIP.AUTO: ABNORMAL
NITRITE UR QL STRIP.AUTO: POSITIVE
PH UR STRIP: 6.5 [PH] (ref 5–8)
PROT UR STRIP-MCNC: 100 MG/DL
RBC #/AREA URNS HPF: ABNORMAL /HPF (ref 0–5)
SP GR UR REFRACTOMETRY: 1.02 (ref 1–1.03)
UROBILINOGEN UR QL STRIP.AUTO: 0.2 EU/DL (ref 0.2–1)
WBC URNS QL MICRO: ABNORMAL /HPF (ref 0–5)

## 2021-12-02 PROCEDURE — 87086 URINE CULTURE/COLONY COUNT: CPT

## 2021-12-02 PROCEDURE — BW40ZZZ ULTRASONOGRAPHY OF ABDOMEN: ICD-10-PCS | Performed by: INTERNAL MEDICINE

## 2021-12-02 PROCEDURE — 97530 THERAPEUTIC ACTIVITIES: CPT

## 2021-12-02 PROCEDURE — 65310000000 HC RM PRIVATE REHAB

## 2021-12-02 PROCEDURE — 81001 URINALYSIS AUTO W/SCOPE: CPT

## 2021-12-02 PROCEDURE — 99232 SBSQ HOSP IP/OBS MODERATE 35: CPT | Performed by: INTERNAL MEDICINE

## 2021-12-02 PROCEDURE — 74011250637 HC RX REV CODE- 250/637: Performed by: INTERNAL MEDICINE

## 2021-12-02 PROCEDURE — 2709999900 HC NON-CHARGEABLE SUPPLY

## 2021-12-02 PROCEDURE — 74011000250 HC RX REV CODE- 250: Performed by: INTERNAL MEDICINE

## 2021-12-02 PROCEDURE — 97116 GAIT TRAINING THERAPY: CPT

## 2021-12-02 PROCEDURE — 74011250637 HC RX REV CODE- 250/637: Performed by: FAMILY MEDICINE

## 2021-12-02 PROCEDURE — 76770 US EXAM ABDO BACK WALL COMP: CPT

## 2021-12-02 RX ORDER — HYDROMORPHONE HYDROCHLORIDE 2 MG/1
1 TABLET ORAL
Qty: 8 TABLET | Refills: 0 | Status: SHIPPED | OUTPATIENT
Start: 2021-12-02 | End: 2021-12-07

## 2021-12-02 RX ORDER — ESTRADIOL 0.1 MG/G
2 CREAM VAGINAL DAILY
Status: DISCONTINUED | OUTPATIENT
Start: 2021-12-03 | End: 2021-12-04 | Stop reason: HOSPADM

## 2021-12-02 RX ORDER — AMIODARONE HYDROCHLORIDE 200 MG/1
200 TABLET ORAL DAILY
Qty: 30 TABLET | Refills: 0 | Status: SHIPPED | OUTPATIENT
Start: 2021-12-03

## 2021-12-02 RX ORDER — SEVELAMER CARBONATE 800 MG/1
1600 TABLET, FILM COATED ORAL
Qty: 180 TABLET | Refills: 0 | Status: SHIPPED | OUTPATIENT
Start: 2021-12-03

## 2021-12-02 RX ORDER — ALLOPURINOL 100 MG/1
100 TABLET ORAL
Qty: 12 TABLET | Refills: 0 | Status: SHIPPED | OUTPATIENT
Start: 2021-12-03

## 2021-12-02 RX ADMIN — DULOXETINE HYDROCHLORIDE 20 MG: 20 CAPSULE, DELAYED RELEASE ORAL at 08:16

## 2021-12-02 RX ADMIN — LIDOCAINE AND PRILOCAINE: 25; 25 CREAM TOPICAL at 08:16

## 2021-12-02 RX ADMIN — Medication 1 TABLET: at 08:15

## 2021-12-02 RX ADMIN — CHOLECALCIFEROL TAB 25 MCG (1000 UNIT) 2000 UNITS: 25 TAB at 17:43

## 2021-12-02 RX ADMIN — HYDROMORPHONE HYDROCHLORIDE 1 MG: 2 TABLET ORAL at 09:24

## 2021-12-02 RX ADMIN — LATANOPROST 1 DROP: 50 SOLUTION OPHTHALMIC at 20:52

## 2021-12-02 RX ADMIN — MIDODRINE HYDROCHLORIDE 5 MG: 5 TABLET ORAL at 17:44

## 2021-12-02 RX ADMIN — ACETAMINOPHEN 650 MG: 325 TABLET ORAL at 08:16

## 2021-12-02 RX ADMIN — MIDODRINE HYDROCHLORIDE 5 MG: 5 TABLET ORAL at 08:16

## 2021-12-02 RX ADMIN — Medication 500 MG: at 08:16

## 2021-12-02 RX ADMIN — APIXABAN 5 MG: 5 TABLET, FILM COATED ORAL at 08:16

## 2021-12-02 RX ADMIN — SEVELAMER CARBONATE 1600 MG: 800 TABLET, FILM COATED ORAL at 17:43

## 2021-12-02 RX ADMIN — LEVOTHYROXINE SODIUM 125 MCG: 125 TABLET ORAL at 06:31

## 2021-12-02 RX ADMIN — MIDODRINE HYDROCHLORIDE 5 MG: 5 TABLET ORAL at 11:54

## 2021-12-02 RX ADMIN — ACETAMINOPHEN 650 MG: 325 TABLET ORAL at 11:54

## 2021-12-02 RX ADMIN — SEVELAMER CARBONATE 1600 MG: 800 TABLET, FILM COATED ORAL at 08:16

## 2021-12-02 RX ADMIN — SEVELAMER CARBONATE 1600 MG: 800 TABLET, FILM COATED ORAL at 11:54

## 2021-12-02 RX ADMIN — HYDROMORPHONE HYDROCHLORIDE 1 MG: 2 TABLET ORAL at 22:27

## 2021-12-02 RX ADMIN — AMIODARONE HYDROCHLORIDE 200 MG: 200 TABLET ORAL at 08:16

## 2021-12-02 RX ADMIN — SODIUM ZIRCONIUM CYCLOSILICATE 5 G: 5 POWDER, FOR SUSPENSION ORAL at 10:04

## 2021-12-02 RX ADMIN — CHOLECALCIFEROL TAB 25 MCG (1000 UNIT) 2000 UNITS: 25 TAB at 08:15

## 2021-12-02 RX ADMIN — PANTOPRAZOLE SODIUM 20 MG: 20 TABLET, DELAYED RELEASE ORAL at 06:31

## 2021-12-02 RX ADMIN — LIDOCAINE AND PRILOCAINE: 25; 25 CREAM TOPICAL at 11:59

## 2021-12-02 RX ADMIN — Medication 1 CAPSULE: at 08:16

## 2021-12-02 RX ADMIN — APIXABAN 5 MG: 5 TABLET, FILM COATED ORAL at 17:43

## 2021-12-02 RX ADMIN — HYDROMORPHONE HYDROCHLORIDE 1 MG: 2 TABLET ORAL at 01:04

## 2021-12-02 RX ADMIN — CETIRIZINE HYDROCHLORIDE 10 MG: 10 TABLET, FILM COATED ORAL at 08:15

## 2021-12-02 RX ADMIN — ACETAMINOPHEN 650 MG: 325 TABLET ORAL at 17:43

## 2021-12-02 RX ADMIN — CYANOCOBALAMIN TAB 1000 MCG 1000 MCG: 1000 TAB at 08:15

## 2021-12-02 NOTE — PROGRESS NOTES
Problem: Mobility Impaired (Adult and Pediatric)  Goal: *Therapy Goal (Edit Goal, Insert Text)  Description: Physical Therapy Short Term Goals  Initiated 11/20/2021, updated 11/29/2021, and to be accomplished within 7 day(s) on 12/06/2021  1. Patient will move from supine to sit and sit to supine , scoot up and down, and roll side to side in bed with supervision/set-up. MET 11/29/2021   Updated Goal: Patient will move from supine to sit and sit to supine , scoot up and down, and roll side to side in bed with modified independence. 2.  Patient will transfer from bed to chair and chair to bed with moderate assistance  using the least restrictive device, consistently maintaining TTWB left LE. MET with PWB Left LE 11/29/2021   Updated Goal: Patient will transfer from bed to chair and chair to bed with distant supervision using the least restrictive device, consistently maintaining PWB left LE. 3.  Patient will perform sit to stand with minimal to moderate assistance without additional assistance left LE to consistently maintain TTWB. MET with PWB Left LE 11/29/2021   Updated Goal: Patient will perform sit to stand consistently at distant supervision maintaining PWB. 4.  Patient will perform w/c mobility SBA level over even surfaces for at least 200 ft before fatiguing. MET 11/29/2021  5. Patient will be able to perform static standing for at least 2 minute duration with 1-2 UE support before needing seated rest, maintaining TTWB left LE appropriately. MET with PWB Left LE 11/29/2021    Updated Goal:  Patient will negotiate 4 (6\") steps with B UE support on handrails with supervision maintaining left LE PWB. Physical Therapy Long Term Goals  Initiated 11/20/2021 and to be accomplished within 28 day(s) 12/18/2021  1. Patient will move from supine to sit and sit to supine , scoot up and down, and roll side to side in bed with modified independence. MET  2.   Patient will transfer from bed to chair and chair to bed with modified independence using the least restrictive device. MET  3. Patient will perform sit to stand with modified independence. MET  4. Patient will ambulate 150 feet with RW maintaining left LE PWB with distant supervision. MET  5. Patient will ascend/descend 4 (6\") steps with B UE support on handrails with distant supervision. Not Met, ongoing  5. Patient will perform w/c mobility mod I over even surfaces for at least 200 ft. MET  6. Patient will negotiate w/c ramp with distant supervision. MET      2021 0920 by Asia Ramirez, PT  Outcome: Resolved/Met  2021 0920 by Asia Ramirez, PT  Outcome: Progressing Towards Goal    PHYSICAL THERAPY DISCHARGE NOTE    Patient: Zee Del Valle [de-identified]64 y.o. female)  Date: 2021  Diagnosis: Multiple closed pelvic fractures without disruption of pelvic ring (HCC) [S32.82XA] Multiple closed pelvic fractures without disruption of pelvic ring (HCC)  Precautions: Contact, Fall, Skin, PWB (50% Left LE)  Chart, physical therapy assessment, plan of care and goals were reviewed. Time in:902  Time out:    Patient seen for: Balance activities; Gait training; Patient education; Transfer training; Wheelchair mobility     Attempted treatment at 1330. Pt off floor for ultrasound. Pt missed two units of skilled PT intervention as a result. Pain:  Pt pain was reported as left LE pain pre-treatment. Pt pain was reported as left LE pain post-treatment. Intervention: Pt reports she requested pain medication prior to first treatment session but was not yet due to receive medication. Patient identified with name and : yes    SUBJECTIVE:     Patient asked, \"can I show him my exercises? \"  Pt appears engaged and participated well in family education opportunity with her son present.   Pt's son expressed concern initially about pt's ability to ascend/descend stairs to enter and exit home as pt will have to do so at least three times per week for dialysis appointments. After participation in hands on training, pt and pt's son appear more at ease and pt's son no longer expresses concern re: stair negotiation. However, pt required repeated education and reminders re: recommendation for first floor set up secondary to current PWB precautions and home set up with chair lift that does not full extend to the top of the stairs. PT recommended and repeated recommendation to pt and pt's son re: first floor set up and awaiting home therapy assessment and recommendations for home environment. OBJECTIVE DATA SUMMARY:     GROSS ASSESSMENT Discharge Assessment 12/2/2021   AROM Generally decreased, functional   Strength Generally decreased, functional   Coordination Within functional limits   Tone Normal   Sensation Intact   PROM Within functional limits       POSTURE Discharge Assessment 12/2/2021   Posture (WDL) Exceptions to WDL   Posture Assessment Rounded shoulders; Kyphosis         BALANCE Discharge Assessment 12/2/2021    Sitting - Static: Good (unsupported)  Sitting - Dynamic: Good (unsupported)  Standing - Static: Fair  Standing - Dynamic : Impaired       BED/CHAIR/WHEELCHAIR TRANSFERS Initial Assessment Discharge Assessment   Rolling Right 4 (Minimal assistance) 6 (Modified independent) with increased time   Rolling Left 4 (Minimal assistance) 6 (Modified independent) with increased time   Supine to Sit 3 (Moderate assistance) (flat head of bed, no railings, needing minimal trunk support) 6 (Modified independent)   with increased time   Sit to Stand Moderate assistance (lifting assistance, support left LE to ensure TTWB) Modified independent   With pt utilizing B UEs to push up to stand and for slow controlled descent with stand to sit.    Sit to Supine 3 (Moderate assistance) (head of bed flat, no rails, assist w/ left LE and reposition) 6 (Modified independent) with increased time   Transfer Assistance Level  (mod/max A for stabilization and for maintaining TTWB left LE) 6 (Modified independent)   Transfer Type SPT with walker (mod/max A due to loss of balance and needing recovery) Other   Comments   Pt maintains PWB throughout functional transfers and mobility. Car Transfer Not tested  (Modified Independent with stand step with RW)   Car Type NT Car Transfer Trainer       [facility] MOBILITY/MANAGEMENT Initial Assessment Discharge Assessment   Able to Propel 230 feet 232 Feet   Assistance Level 4 Modified [place]   Curbs/ramps assistance required 0 (Not tested) NT   Wheelchair set up assistance required 2 (Maximal assistance) Modified [place]   Wheelchair management Manages left brake, Manages right brake (assistance with armrests, footrests) Manages B brakes       GAIT Discharge Assessment 12/2/2021   Gait Description (WDL) Exceptions to WDL   Gait Abnormalities Decreased step clearance       WALKING INDEPENDENCE Initial Assessment Discharge Assessment   Assistive device  (NT) Gait belt; Walker, rolling   Ambulation assistance - level surface 0 (Not tested) 6 (Modified independent)   Distance 0 Feet (ft) 306 Feet (ft), Household distances   Comments     Pt ambulates with forward flexed posture maintaining PWB 50% through left LE. Ambulation assistance - unlevel surface  (NT due to safety)  NT       STEPS/STAIRS Initial Assessment Discharge Assessment   Steps/Stairs ambulated 0 (Not tested) 4 (6\")   Rail Use  (NT) Both (for 2 steps and left for 2 steps for two trials)   Assistance Level   5 (Supervision/setup)   Comments   Pt participated in stair negotiation training with PT for one trial with PT verbally educating son re: safe guarding techniques. Pt then participated in stair negotiation with pt's son providing appropriate guarding with PT verbal cuing for use of gait belt as needed for safety. Curbs/Ramps  (NT due to safety)  NT       ASSESSMENT:  Pt has progressed well and achieved 5/6 long term goals established on evaluation.   Pt has not achieved goal for negotiation stairs with distant supervision due to ongoing left LE pain with decreased weight bearing tolerance. LTGs: 5/6     PLAN:  Pt would benefit from continued skilled physical therapy in order to improve independent functional mobility at home health level. Interventions may include range of motion (AROM, PROM B LE/trunk), motor function (B LE/trunk strengthening/coordination), activity tolerance (vitals, oxygen saturation levels), bed mobility training, balance activities, gait training (progressive ambulation program), and functional transfer training. Discharge Recommendations:  Home Physical Therapy with supplemental assistance to progress to Outpatient  Further Equipment Recommendations for Discharge:  rolling walker and N/A       Activity Tolerance:   Good  Please refer to the flowsheet for vital signs taken during this treatment.   After treatment:   [] Patient left in no apparent distress in bed  [x] Patient left in no apparent distress sitting up in chair  [] Patient left in no apparent distress in w/c mobilizing under own power  [] Patient left in no apparent distress dining area  [] Patient left in no apparent distress mobilizing under own power  [x] Call bell left within reach  [] Nursing notified  [] Caregiver present  [] Bed alarm activated   [x] Chair alarm activated      Marco A Farias PT, DPT  12/2/2021

## 2021-12-02 NOTE — DISCHARGE INSTRUCTIONS
------------------------------------------------------------------------------------------------------------    DISCHARGE INSTRUCTIONS    1. Make sure that when you request refills at the pharmacy that the refill requests are sent to your PCP (NOT to the prescriber at the [facility]) to avoid any delays in getting your medication refills. The physician at the [facility] for 2021 Rafael Chapman will not able to order medications or refills after discharge -- Please understand that though we would like to help, it is simply not safe for our physician to order you a medication that we cannot monitor. 2. If any of the prescribed medications require a PRIOR AUTHORIZATION, contact your Primary Care Physician or specialist to EITHER complete prior authorizations and paperwork on your behalf OR prescribe an alternative medication. -- Please understand that though we would like to help, it is simply not safe for our physician to order you a medication that we cannot monitor. 3. If any of your prescriptions medications PRIOR TO ADMISSION TO [facility] [place] [id number] needs a refill (and either the dosage, frequency or instructions was not changed), kindly contact your Primary Care Physician for refills.     -------------------------------------------------------------------------------------------------------------------

## 2021-12-02 NOTE — PROGRESS NOTES
Problem: Self Care Deficits Care Plan (Adult)  Goal: *Therapy Goal (Edit Goal, Insert Text)  Description: Long Term Goals (to be met upon discharge date) in order to increase pt's functional independence and safety, and decrease burden of care:  1. Pt will perform self-feeding with Mod Independent  12/2/21  2. Pt will perform grooming with Mod Independent  12/2/21   3. Pt will perform UB bathing with supervision  12/2/21  4. Pt will perform LB bathing with SBA  12/2/21  5. Pt will perform shower transfer with GM SBA 12/2/21  6. Pt will perform UB dressing with supervision  12/2/21  7. Pt will perform LB dressing with SBA  12/2/21  8. Pt will perform toileting task with SBA  12/2/21 (Missy)  9. Pt will perform toilet transfer with SBA  12/2/21 (Missy)  10. Pt will perform an IADL task while standing with SBA  12/2/21     Short Term Weekly Goals for (11/20/2021-11/27/2021) in order to increase pt's functional independence and safety, and decrease burden of care:updated 11/29/21  1. Pt will perform self-feeding with supervision   11/29/21  2. Pt will perform grooming with supervision  11/29/21   3. Pt will perform UB bathing with SBA    11/29/21  4. Pt will perform LB bathing with Min A   11/29/21  5. Pt will perform shower transfer with Min A    11/29/21  6. Pt will perform UB dressing with SBA     11/29/21  7. Pt will perform LB dressing with Min A    11/29/21  8. Pt will perform toileting task with Min A   11/29/21  9.  Pt will perform toilet transfer with Min A  11/29/21  Outcome: Progressing Towards Goal  OCCUPATIONAL THERAPY DISCHARGE    Patient: Darell Kennedy [de-identified]64 y.o. female)  Date: 12/2/2021    First Tx Session  Time In: 1000  Time Out[de-identified] 1100      Primary Diagnosis: Multiple closed pelvic fractures without disruption of pelvic ring (HCC) [S32.82XA]   Multiple closed pelvic fractures without disruption of pelvic ring (HCC)    Precautions:   Contact, Fall, Skin, PWB (50% Left LE)    Barriers to Learning/Limitations: None  Compensate with: visual, verbal, tactile, kinesthetic cues/model     Patient identified with name and :Yes     SUBJECTIVE:   Patient stated What's next.     OBJECTIVE DATA SUMMARY:     Past Medical History:   Diagnosis Date    Anemia in end-stage renal disease ([facility] [address]     Anticoagulated by anticoagulation treatment     On Apixaban    Chronic hypotension     On Midodrine    Chronic pain     Closed fracture of left inferior pubic ramus, with routine healing, subsequent encounter 2021    Closed fracture of superior pubic ramus, left, with routine healing, subsequent encounter 2021    Closed nondisplaced fracture of anterior wall of left acetabulum with routine healing 2021    Depression     End-stage renal disease on hemodialysis ([facility] [address]     HD at [facility].  Tel # [phone]    Gastroesophageal reflux disease     Glaucoma     History of acute pyelonephritis 2020    History of hydronephrosis 10/5/2021    History of infection with vancomycin resistant Enterococcus (VRE) 10/8/2021    Urine culture (collected 10/8/2021, resulted 10/14/2021) yielded growth of >100,000 colonies/ml of Enterococcus faecalis RESISTANT to Ciprofloxacin, Levofloxacin, Tetracycline and Vancomycin    History of kidney stones     History of recurrent urinary tract infection     History of sepsis 2021    History of septic shock 10/8/2021    History of urethral stricture     History of urinary tract infection 10/8/2021    Urine culture (collected 10/8/2021, resulted 10/14/2021) yielded growth of >100,000 colonies/ml of Enterococcus faecalis RESISTANT to Ciprofloxacin, Levofloxacin, Tetracycline and Vancomycin    Hyperlipidemia     Hyperphosphatemia 2021    Hypothyroidism     Lung mass     Mononeuropathy     Involving ring finger of left hand    Need for prophylactic isolation 10/8/2021    Urine culture (collected 10/8/2021, resulted 10/14/2021) yielded growth of >100,000 colonies/ml of Enterococcus faecalis RESISTANT to Ciprofloxacin, Levofloxacin, Tetracycline and Vancomycin    Paroxysmal atrial fibrillation (HCC)     Secondary hyperparathyroidism of renal origin ([facility] [address]     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 ([facility] [address]     HbA1c (10/8/2021) = 4.6    Uric acid nephrolithiasis     Urinary incontinence      Past Surgical History:   Procedure Laterality Date    HX APPENDECTOMY      HX CHOLECYSTECTOMY      HX GASTRIC BYPASS      Gastric stapling    HX KNEE ARTHROSCOPY      HX UROLOGICAL      right PCN placement    HX UROLOGICAL  07/23/2018    RIGHT URETEROSCOPY WITH HOLMIUM LASER    IR EXCHANGE NEPHRO PERC LT SI  2/21/2020    IR EXCHANGE NEPHRO PERC RT SI  4/13/2020    IR EXCHANGE NEPHRO PERC RT SI  7/17/2020    IR NEPHROSTOMY PERC RT PLC CATH  SI  10/14/2020    IR NEPHROURETERAL PERC RT PLC CATH NEW ACCESS  SI  4/30/2020    GA INTRO CATH DIALYSIS CIRCUIT DX ANGRPH FLUOR S&I Left 9/24/2020    FISTULOGRAM LEFT/poss permanent catheter placement performed by Madeleine Garcia MD at [facility] CATH [facility]    VASCULAR SURGERY PROCEDURE UNLIST      lef AVF     Prior Level of Function/Home Situation: Yes  Home Situation  Home Environment: Private residence  # Steps to Enter: 4  One/Two Story Residence: Two story  # of Interior Steps: 12  Living Alone: No  Support Systems: Child(isaura) (lives with son and daughter-in-law)  Patient Expects to be Discharged to[de-identified] House  Current DME Used/Available at Home: Cane, straight, Walker, rollator, Walker, rolling  Tub or Shower Type: Shower (using tub bench)  [x]     Right hand dominant   []     Left hand dominant    Therapeutic Exercise: To increase strength for ADLs:  Propelled w/c from room <>gym Sci Fit up to 10 minutes. HEP 3x10  with handout given. Pain:  Pt reports 0/10 pain or discomfort prior to treatment. Pt reports 0/10 pain or discomfort post treatment.    Problem List:    Decreased strength B UE  [x]     Decreased strength trunk/core  []     Decreased AROM   [x]     Decreased PROM  []     Decreased balance sitting  []     Decreased balance standing  [x]     Decreased endurance  [x]     Pain  [x]       Functional Limitations:   Decreased independence with ADL  [x]     Decreased independence with functional transfers  [x]     Decreased independence with ambulation  [x]     Decreased independence with IADL  [x]       Outcome Measures:       MMT Initial Assessment    Right Upper Extremity  Left Upper Extremity    UE AROM WNL WNL    4/5 4/5          MMT Discharge Assessment   Right Upper Extremity  Left Upper Extremity    UE AROM 45 degrees in shoulder abd/fl ROM with distal WFL; MMT -3/5 proximal and 4/5 distal WFL ROM; MMT 3+/5 proximal and 4/5 distal     [facility] WFL       0/5 No palpable muscle contraction  1/5 Palpable muscle contraction, no joint movement  2-/5 Less than full range of motion in gravity eliminated position  2/5 Able to complete full range of motion in gravity eliminated position  2+/5 Able to initiate movement against gravity  3-/5 More than half but not full range of motion against gravity  3/5 Able to complete full range of motion against gravity  3+/5 Completes full range of motion against gravity with minimal resistance  4-/5 Completes full range of motion against gravity with minimal resistance  4/5 Completes full range of motion against gravity with moderate resistance  5/5 Completes full range of motion against gravity with maximum resistance    Coordination: Intact  Sensation: Reports pre admission numbness in left hand; right hand intact    FIM SCORES Initial Assessment Discharge Assessment   Eating 5  7   Grooming 5      Oral Hygiene FIM: 6    Bathing 3     5   Upper Body Dressing 4  5   Lower Body Dressing 3  5    Sock and/or Shoe Management FIM: 5   Toileting 3  6   Tub/Shower Transfer 4  6   Toilet Transfer 4  6   Comprehension 5 Score: 6 Expression 5 Score: 5   Social Interaction 4 Score: 4   Problem Solving 4 Score: 4   Memory 4 Score: 4   Please see [facility] Interdisciplinary Eval: Coordination/Balance Section for details regarding FIM score description. Activity Tolerance:   Fair    ASSESSMENT:  Pt seen for Skilled Occupational Therapy since 11/20/21 to address functional deficits in ADLs/IADLs. Pt participated in therapeutic exercise, therapeutic activity, transfer training, education/instruction in using assistive devices/ compensatory strategies, and bathing/ dressing retraining. Pt currently shows S-/Set-Missy in most ADLs/IADLs meeting all STG's/ LTG's. Pt/CG(son) participated in family training where functional transfers and level of care were discussed. Pt verbalized understanding and appeared supportive. Progression toward goals:  [x]      Improving appropriately and progressing toward goals  []      Improving slowly and progressing toward goals  []      Not making progress toward goals and plan of care will be adjusted     PLAN:  Pt would benefit from continued skilled occupational therapy in order to improve ADL function and IADL with use of least restrictive device. Interventions may include range of motion (AROM, PROM B UE/trunk), motor function (B UE/trunk strengthening/coordination), activity tolerance (vitals, oxygen saturation levels), ADL, balance activities, IADL, and functional transfer training. Discharge Recommendations: Home Health with 24 hr asst  Further Equipment Recommendations for Discharge: bedside commode        Please refer to the flow sheet for vital signs taken during this treatment.   After treatment:   [x]  Patient left in no apparent distress sitting up in chair  []  Patient left in no apparent distress in bed  []  Call bell left within reach  []  Nursing notified  []  Caregiver present  []  Bed alarm activated    COMMUNICATION/EDUCATION:   Communication/Collaboration:  []      Home safety education was provided and the patient/caregiver indicated understanding. [x]      Patient/family have participated as able and agree with findings and recommendations. []      Patient is unable to participate in plan of care at this time.     Tahmina Fraser OT  12/2/2021

## 2021-12-02 NOTE — ROUTINE PROCESS
SHIFT CHANGE NOTE FOR [facility]    Bedside and Verbal shift change report given to JOSE ANTONIO Tran (oncoming nurse) by Naila Little (offgoing nurse). Report included the following information SBAR, Kardex, MAR and Recent Results.     Situation:   Code Status: DNR   Hospital Day: 13   Problem List:   Hospital Problems  Date Reviewed: 12/1/2021          Codes Class Noted POA    Gross hematuria ICD-10-CM: R31.0  ICD-9-CM: 599.71  12/2/2021 No        Hyperkalemia ICD-10-CM: E87.5  ICD-9-CM: 276.7  11/29/2021 No        Mononeuropathy (Chronic) ICD-10-CM: G58.9  ICD-9-CM: 355.9  Unknown Yes    Overview Signed 11/28/2021 11:29 AM by Vivian Conrad MD     Involving ring finger of left hand             * (Principal) Multiple closed pelvic fractures without disruption of pelvic ring ([address] ICD-10-CM: M71.26ID  ICD-9-CM: 808.44  11/19/2021 Yes        Hyperphosphatemia ICD-10-CM: E83.39  ICD-9-CM: 275.3  11/14/2021 Yes        Anticoagulated by anticoagulation treatment ICD-10-CM: Z79.01  ICD-9-CM: V58.61  Unknown Yes    Overview Signed 11/23/2021  9:49 AM by Vivian Conrad MD     On Apixaban             Paroxysmal atrial fibrillation (HCC) (Chronic) ICD-10-CM: I48.0  ICD-9-CM: 427.31  Unknown Yes        Closed fracture of left inferior pubic ramus, with routine healing, subsequent encounter ICD-10-CM: S32.592D  ICD-9-CM: V54.13  11/12/2021 Yes        Closed nondisplaced fracture of anterior wall of left acetabulum with routine healing ICD-10-CM: S32.415D  ICD-9-CM: V54.19  11/12/2021 Yes        Closed fracture of superior pubic ramus, left, with routine healing, subsequent encounter ICD-10-CM: S32.512D  ICD-9-CM: V54.19  11/12/2021 Yes        History of infection with vancomycin resistant Enterococcus (VRE) ICD-10-CM: N77.30  ICD-9-CM: V12.09  10/8/2021 Yes    Overview Signed 11/23/2021  9:51 AM by Vivian Conrad MD     Urine culture (collected 10/8/2021, resulted 10/14/2021) yielded growth of >100,000 colonies/ml of Enterococcus faecalis RESISTANT to Ciprofloxacin, Levofloxacin, Tetracycline and Vancomycin             History of urinary tract infection ICD-10-CM: Z87.440  ICD-9-CM: V13.02  10/8/2021 Yes    Overview Signed 11/23/2021  9:52 AM by Enrique Florentino MD     Urine culture (collected 10/8/2021, resulted 10/14/2021) yielded growth of >100,000 colonies/ml of Enterococcus faecalis RESISTANT to Ciprofloxacin, Levofloxacin, Tetracycline and Vancomycin             Need for prophylactic isolation ICD-10-CM: Z41.8  ICD-9-CM: V07.0  10/8/2021 Yes    Overview Signed 11/23/2021  9:52 AM by Enrique Florentino MD     Urine culture (collected 10/8/2021, resulted 10/14/2021) yielded growth of >100,000 colonies/ml of Enterococcus faecalis RESISTANT to Ciprofloxacin, Levofloxacin, Tetracycline and Vancomycin             End-stage renal disease on hemodialysis (HCC) (Chronic) ICD-10-CM: N18.6, Z99.2  ICD-9-CM: 585.6, V45.11  Unknown Yes    Overview Signed 11/23/2021  9:56 AM by Enrique Florentino MD     HD at [facility] on [facility] on MWF.  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 ([address] (Chronic) ICD-10-CM: E11.22, N18.6  ICD-9-CM: 250.40, 585.6  Unknown Yes    Overview Signed 11/23/2021  9:50 AM by Enrique Florentino MD     HbA1c (10/8/2021) = 4.6             Chronic hypotension (Chronic) ICD-10-CM: I95.89  ICD-9-CM: 458.1  Unknown Yes    Overview Signed 11/23/2021 10:01 AM by Enrique Florentino MD     On Midodrine                   Background:   Past Medical History:   Past Medical History:   Diagnosis Date    Anemia in end-stage renal disease ([address]     Anticoagulated by anticoagulation treatment     On Apixaban    Chronic hypotension     On Midodrine    Chronic pain     Closed fracture of left inferior pubic ramus, with routine healing, subsequent encounter 11/12/2021    Closed fracture of superior pubic ramus, left, with routine healing, subsequent encounter 11/12/2021    Closed nondisplaced fracture of anterior wall of left acetabulum with routine healing 11/12/2021    Depression     End-stage renal disease on hemodialysis ([address]     HD at [facility] on [facility] on MWF.  Tel # [phone]    Gastroesophageal reflux disease     Glaucoma     History of acute pyelonephritis 2/20/2020    History of hydronephrosis 10/5/2021    History of infection with vancomycin resistant Enterococcus (VRE) 10/8/2021    Urine culture (collected 10/8/2021, resulted 10/14/2021) yielded growth of >100,000 colonies/ml of Enterococcus faecalis RESISTANT to Ciprofloxacin, Levofloxacin, Tetracycline and Vancomycin    History of kidney stones     History of recurrent urinary tract infection     History of sepsis 6/18/2021    History of septic shock 10/8/2021    History of urethral stricture     History of urinary tract infection 10/8/2021    Urine culture (collected 10/8/2021, resulted 10/14/2021) yielded growth of >100,000 colonies/ml of Enterococcus faecalis RESISTANT to Ciprofloxacin, Levofloxacin, Tetracycline and Vancomycin    Hyperlipidemia     Hyperphosphatemia 11/14/2021    Hypothyroidism     Lung mass     Mononeuropathy     Involving ring finger of left hand    Need for prophylactic isolation 10/8/2021    Urine culture (collected 10/8/2021, resulted 10/14/2021) yielded growth of >100,000 colonies/ml of Enterococcus faecalis RESISTANT to Ciprofloxacin, Levofloxacin, Tetracycline and Vancomycin    Paroxysmal atrial fibrillation ([address]     Secondary hyperparathyroidism of renal origin ([address]     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 ([address]     HbA1c (10/8/2021) = 4.6    Uric acid nephrolithiasis     Urinary incontinence         Assessment:   Changes in Assessment throughout shift: No change to previous assessment    Patient has a central line: no  Patient has Cline Cath: no      Last Vitals:     Vitals:    12/01/21 2031 12/01/21 2149 12/02/21 0104 12/02/21 0727   BP: 92/62 (!) 99/56 (!) 100/55 121/67 Pulse: 72 70 77 79   Resp: 18 16 18 16   Temp: 97.5 °F (36.4 °C)   (!) 96.7 °F (35.9 °C)   TempSrc:       SpO2: 93% 97% 97% 97%   Weight:       Height:            PAIN    Pain Assessment    Pain Intensity 1: 0 (12/02/21 0951) Pain Intensity 1: 2 (12/29/14 1105)    Pain Location 1: Leg Pain Location 1: Abdomen    Pain Intervention(s) 1: Medication (see MAR) Pain Intervention(s) 1: Medication (see MAR)  Patient Stated Pain Goal: 0 Patient Stated Pain Goal: 0  o Intervention effective: yes  o Other actions taken for pain: Medication (see MAR)     Skin Assessment  Skin color Skin Color: Appropriate for ethnicity  Condition/Temperature Skin Condition/Temp: Dry, Warm  Integrity Skin Integrity: Scars (comment)  Turgor Turgor: Non-tenting  Weekly Pressure Ulcer Documentation  Pressure  Injury Documentation: No Pressure Injury Noted-Pressure Ulcer Prevention Initiated  Wound Prevention & Protection Methods  Orientation of wound Orientation of Wound Prevention: Posterior  Location of Prevention Location of Wound Prevention: Heel, Sacrum/Coccyx  Dressing Present Dressing Present : No  Dressing Status    Wound Offloading Wound Offloading (Prevention Methods): Bed, pressure redistribution/air, Elevate heels, Pillows, Repositioning, Wheelchair     INTAKE/OUPUT  Date 12/01/21 0700 - 12/02/21 0659 12/02/21 0700 - 12/03/21 0659   Shift 8257-9138 3002-2904 24 Hour Total 9179-1200 6172-0023 24 Hour Total   INTAKE   P.O. 120 600 720        P. O. 120 600 720      Shift Total(mL/kg) 120(1.3) 600(6.3) 720(7.6)      OUTPUT   Urine(mL/kg/hr)  0(0) 0(0)        Urine Voided  0 0        Urine Occurrence(s) 3 x 1 x 4 x 0 x  0 x   Stool           Stool Occurrence(s) 0 x 0 x 0 x 0 x  0 x   Dialysis 1650  1650        NET Fluid Removed (mL) 1650  1650      Shift Total(mL/kg) 1650(17.4) 0(0) 1650(17.4)      NET -1530 600 -930      Weight (kg) 95 95 95 95 95 95       Recommendations:  1. Patient needs and requests: None at this time    2.  Pending tests/procedures: Routine labs     3. Functional Level/Equipment: Partial (one person) / Bed (comment); Charna Aparna; Wheelchair    Fall Precautions:   Fall risk precautions were reinforced with the patient; she was instructed to call for help prior to getting up. The following fall risk precautions were continued: bed/ chair alarms, door signage, yellow bracelet and socks as well as update of the Earma Olivia tool in the patient's room. Freddy Score: 4    HEALS Safety Check    A safety check occurred in the patient's room between off going nurse and oncoming nurse listed above. The safety check included the below items  Area Items   H  High Alert Medications - Verify all high alert medication drips (heparin, PCA, etc.)   E  Equipment - Suction is set up for ALL patients (with adelaide)  - Red plugs utilized for all equipment (IV pumps, etc.)  - WOWs wiped down at end of shift.  - Room stocked with oxygen, suction, and other unit-specific supplies   A  Alarms - Bed alarm is set for fall risk patients  - Ensure chair alarm is in place and activated if patient is up in a chair   L  Lines - Check IV for any infiltration  - Cline bag is empty if patient has a Cline   - Tubing and IV bags are labeled   S  Safety   - Room is clean, patient is clean, and equipment is clean. - Hallways are clear from equipment besides carts. - Fall bracelet on for fall risk patients  - Ensure room is clear and free of clutter  - Suction is set up for ALL patients (with adelaide)  - Hallways are clear from equipment besides carts.    - Isolation precautions followed, supplies available outside room, sign posted     Ailyn Arboleda

## 2021-12-02 NOTE — CONSULTS
[unfilled]       ASSESSMENT:   Gross hematuria related to ureteral stent   Right distal ureteral stricture, managed with chronic stents exchanged Q6 mo    s/p ureteral stent placement   Hgb oscillates 8.8-10.0, appears to be at baseline, consistent with chronic anemia   UTI, recurrent    UA positive nitrite, 4+ bacteria    WBC normal, afebrile    Prior culture 11/18 100K Proteus, resistant to macrobid   Completed course of Cefpodoxime 200 mg PO q 24 hr on 11/25/21   ESRD on HD- MWF   Still with significant urine output/production   Afib on Eliquis   HTN  DMII  Admitted to inpatient rehab unit following multiple pelvic fractures     PLAN:    Appreciate management per medicine   Encourage p.o fluids to help dilute urine as much as possible   Hematuria is mild- urine is light tea colored    Nursing instructed to save urine samples throughout next 24 hrs so we can Rack urine    Bleeding is expected/normal with presence of ureteral stent. Discussed with patient; as long as she remains voiding without difficulty and not retaining, no intervention is needed    Bladder scan PRN if patient is unable to void, documenting PVR in progress note    If retaining over 350cc, recommend Cline catheter placement   Trend Hbg - if <7, transfuse per primary team   Repeat Ucx pending-  Recommend abx per primary - she completed a recent course of Cefpodoxime 11/25. Continue prevention of recurrent UTIs; Vaginal Estrogen cream    Behavioral modifications/timed voiding, fluid intake   Will review US when able   Following     Follow up arranged? Will arrange for repeat visit with Dr Alvarez Sanches when next stent exchange is due -- in 4mo (in March)     NOVA Cadet PA-C   [facility]  Pager: [phone]    December 2, 2021 11:16 AM    No chief complaint on file.       HISTORY OF PRESENT ILLNESS:  Shaan Hill is a 64 y.o. female who is seen in consultation as referred by  Santana Ortiz  for gross hematuria. Pt is admitted to the inpatient rehab unit of [facility]. 12/2,  was consulted for gross hematuria. Pt has a history of right distal ureteral stricture, managed with chronic stents Q6 mo exchanged. Last exchanged on 10/5/21 with Dr Justyn Simon. Also has recurrent UTIs. HPI: Hematuria is tea colored, no blood clots. Denies abdominal pain or stent irritation, denies difficulty voiding and still makes decent urine output despite being on HD. She denies flank pain. Reports gross heme started yesterday. Location bladder, right ureter   Duration few days, intermittent chronic    Associated signs/symptoms none  Modifying factors fluid intake   Severity mild     Past Medical History:   Diagnosis Date    Anemia in end-stage renal disease ([facility] [address]     Anticoagulated by anticoagulation treatment     On Apixaban    Chronic hypotension     On Midodrine    Chronic pain     Closed fracture of left inferior pubic ramus, with routine healing, subsequent encounter 11/12/2021    Closed fracture of superior pubic ramus, left, with routine healing, subsequent encounter 11/12/2021    Closed nondisplaced fracture of anterior wall of left acetabulum with routine healing 11/12/2021    Depression     End-stage renal disease on hemodialysis ([facility] [address]     HD at [facility] on [facility] on [address].  Tel # [phone]    Gastroesophageal reflux disease     Glaucoma     History of acute pyelonephritis 2/20/2020    History of hydronephrosis 10/5/2021    History of infection with vancomycin resistant Enterococcus (VRE) 10/8/2021    Urine culture (collected 10/8/2021, resulted 10/14/2021) yielded growth of >100,000 colonies/ml of Enterococcus faecalis RESISTANT to Ciprofloxacin, Levofloxacin, Tetracycline and Vancomycin    History of kidney stones     History of recurrent urinary tract infection     History of sepsis 6/18/2021    History of septic shock 10/8/2021    History of urethral stricture     History of urinary tract infection 10/8/2021    Urine culture (collected 10/8/2021, resulted 10/14/2021) yielded growth of >100,000 colonies/ml of Enterococcus faecalis RESISTANT to Ciprofloxacin, Levofloxacin, Tetracycline and Vancomycin    Hyperlipidemia     Hyperphosphatemia 11/14/2021    Hypothyroidism     Lung mass     Mononeuropathy     Involving ring finger of left hand    Need for prophylactic isolation 10/8/2021    Urine culture (collected 10/8/2021, resulted 10/14/2021) yielded growth of >100,000 colonies/ml of Enterococcus faecalis RESISTANT to Ciprofloxacin, Levofloxacin, Tetracycline and Vancomycin    Paroxysmal atrial fibrillation (HCC)     Secondary hyperparathyroidism of renal origin ([facility] [address]     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 ([facility] [address]     HbA1c (10/8/2021) = 4.6    Uric acid nephrolithiasis     Urinary incontinence        Past Surgical History:   Procedure Laterality Date    HX APPENDECTOMY      HX CHOLECYSTECTOMY      HX GASTRIC BYPASS      Gastric stapling    HX KNEE ARTHROSCOPY      HX UROLOGICAL      right PCN placement    HX UROLOGICAL  07/23/2018    RIGHT URETEROSCOPY WITH HOLMIUM LASER    IR EXCHANGE NEPHRO PERC LT SI  2/21/2020    IR EXCHANGE NEPHRO PERC RT SI  4/13/2020    IR EXCHANGE NEPHRO PERC RT SI  7/17/2020    IR NEPHROSTOMY PERC RT PLC CATH  SI  10/14/2020    IR NEPHROURETERAL PERC RT PLC CATH NEW ACCESS  SI  4/30/2020    GA INTRO CATH DIALYSIS CIRCUIT DX ANGRPH FLUOR S&I Left 9/24/2020    FISTULOGRAM LEFT/poss permanent catheter placement performed by Theodora Adan MD at [facility] CATH LAB    VASCULAR SURGERY PROCEDURE UNLIST      lef AVF       Social History     Tobacco Use    Smoking status: Never Smoker    Smokeless tobacco: Never Used   Vaping Use    Vaping Use: Never used   Substance Use Topics    Alcohol use: Never    Drug use: Never       Allergies   Allergen Reactions    Albumin, Human 25 % Itching     Headache - severe migraine like, itchy eyes, runny nose    Ciprofloxacin Hives    Cyclopentolate Unknown (comments)    Iron Sucrose Diarrhea    Statins-Hmg-Coa Reductase Inhibitors Other (comments)     Body ache       Family History   Problem Relation Age of Onset    Heart Surgery Sister        Current Facility-Administered Medications   Medication Dose Route Frequency Provider Last Rate Last Admin    sodium zirconium cyclosilicate (LOKELMA) powder packet 5 g  5 g Oral DAILY Robin Pedersen MD   5 g at 12/02/21 1004    sevelamer carbonate (RENVELA) tab 1,600 mg  1,600 mg Oral TID WITH MEALS Di Ferris MD   1,600 mg at 12/02/21 0816    lidocaine-prilocaine (EMLA) 2.5-2.5 % cream   Topical BID Di Ferris MD   Given at 12/02/21 0816    midodrine (PROAMATINE) tablet 5 mg  5 mg Oral TID WITH MEALS Di Ferris MD   5 mg at 12/02/21 0816    allopurinoL (ZYLOPRIM) tablet 100 mg  100 mg Oral Q MON, WED & Kortney Mahajan MD   100 mg at 12/01/21 2036    epoetin caitlin-epbx (RETACRIT) injection 8,000 Units  8,000 Units SubCUTAneous Q MON, WED & Kortney Mahajan MD   8,000 Units at 12/01/21 2036    cetirizine (ZYRTEC) tablet 10 mg  10 mg Oral DAILY Gia Villegas MD   10 mg at 12/02/21 0815    acetaminophen (TYLENOL) tablet 650 mg  650 mg Oral Q4H PRN iD Ferris MD   650 mg at 12/01/21 2156    bisacodyL (DULCOLAX) tablet 10 mg  10 mg Oral Q48H PRN Di Ferris MD        amiodarone (CORDARONE) tablet 200 mg  200 mg Oral DAILY Di Ferris MD   200 mg at 12/02/21 0816    acetaminophen (TYLENOL) tablet 650 mg  650 mg Oral TID Di Ferris MD   650 mg at 12/02/21 0816    apixaban (ELIQUIS) tablet 5 mg  5 mg Oral BID Di Ferris MD   5 mg at 12/02/21 0816    HYDROmorphone (DILAUDID) tablet 1 mg  1 mg Oral Q8H PRN Di Ferris MD   1 mg at 12/02/21 8908    meclizine (ANTIVERT) tablet 12.5 mg  12.5 mg Oral TID PRN Di Ferris MD        DULoxetine (CYMBALTA) capsule 20 mg  20 mg Oral DAILY Zack Díaz MD   20 mg at 12/02/21 6645    vitamin B complex-folic acid 0.4 mg tablet  1 Tablet Oral DAILY Zack Díaz MD   1 Tablet at 12/02/21 0815    cyanocobalamin tablet 1,000 mcg  1,000 mcg Oral DAILY Zack Díaz MD   1,000 mcg at 12/02/21 0815    L. acidophilus,casei,rhamnosus (BIO-K PLUS) capsule 1 Capsule  1 Capsule Oral DAILY Zack Díaz MD   1 Capsule at 12/02/21 0708    ascorbic acid (vitamin C) (VITAMIN C) tablet 500 mg  500 mg Oral DAILY Zack Díaz MD   500 mg at 12/02/21 8502    cholecalciferol (VITAMIN D3) (1000 Units /25 mcg) tablet 2,000 Units  2,000 Units Oral BID Zack Díaz MD   2,000 Units at 12/02/21 0815    latanoprost (XALATAN) 0.005 % ophthalmic solution 1 Drop  1 Drop Both Eyes QPM Zack Díaz MD   1 Drop at 12/01/21 2036    levothyroxine (SYNTHROID) tablet 125 mcg  125 mcg Oral Blakeol MD Eva   125 mcg at 12/02/21 0631    pantoprazole (PROTONIX) tablet 20 mg  20 mg Oral ACB Zack Díaz MD   20 mg at 12/02/21 0631    ondansetron (ZOFRAN ODT) tablet 4 mg  4 mg Oral TID PRN Zack Díaz MD   4 mg at 11/30/21 2312    lidocaine 4 % patch 1 Patch  1 Patch TransDERmal Q24H Zack Díaz MD   1 Patch at 12/02/21 0816    gabapentin (NEURONTIN) capsule 200 mg  200 mg Oral Q MON, WED & Keith Valerio MD   200 mg at 12/01/21 1836    doxercalciferoL (HECTOROL) 4 mcg/2 mL injection 4 mcg  4 mcg IntraVENous DIALYSIS MON, WED & Keith Valerio MD   4 mcg at 12/01/21 1528         Review of Systems:  ROS is:     Not obtainable due to patient factors: none     Negative for: Ophthalmologic issues, ENT issues, Cardiovascular issues, respiratory issues, GI issues, neurologic issues, hematoogic issues, skin lesions, musculoskeletal issues, psychiatric issues  Exceptions: yes    Positive for:  Gross heme     PHYSICAL EXAMINATION:     Visit Vitals  /67 (BP 1 Location: Right upper arm, BP Patient Position: Sitting)   Pulse 79 Temp (!) 96.7 °F (35.9 °C)   Resp 16   Ht 5' 2\" (1.575 m)   Wt 95 kg (209 lb 8 oz)   SpO2 97%   BMI 38.32 kg/m²     Constitutional: Well developed, well-nourished female in no acute distress. HEENT: Normocephalic, Atraumatic   CV:   no edema   Respiratory: No respiratory distress or difficulties breathing   Abdomen:  Soft and nontender.  Female:  CVA tenderness: none                         Urine is tea colored per nursing   Skin:  Normal color. No evidence of jaundice. Neuro/Psych:  Patient with appropriate affect. Alert and oriented. Wheelchair at time of exam     Renal US 12/2/21:   Results pending     REVIEW OF LABS AND IMAGING:         Labs: Results:   Chemistry    Recent Labs     12/01/21  0622   GLU 83   *   K 5.9*   CL 99*   CO2 25   BUN 52*   CREA 6.51*   CA 9.1   AGAP 9   BUCR 8*   ALB 3.0*      CBC w/Diff Recent Labs     12/01/21  0622   WBC 5.7   RBC 2.82*   HGB 8.8*   HCT 28.1*      GRANS 47   LYMPH 36   EOS 2      Cultures No results for input(s): CULT in the last 72 hours.   All Micro Results     Procedure Component Value Units Date/Time    CULTURE, URINE [[id number]]     Order Status: Sent Specimen: Urine from Clean catch             Urinalysis Color   Date Value Ref Range Status   12/02/2021 DARK YELLOW   Final     Appearance   Date Value Ref Range Status   12/02/2021 TURBID   Final     Specific gravity   Date Value Ref Range Status   12/02/2021 1.022 1.005 - 1.030   Final     pH (UA)   Date Value Ref Range Status   12/02/2021 6.5 5.0 - 8.0   Final     Protein   Date Value Ref Range Status   12/02/2021 100 (A) NEG mg/dL Final     Ketone   Date Value Ref Range Status   12/02/2021 Negative NEG mg/dL Final     Bilirubin   Date Value Ref Range Status   12/02/2021 SMALL (A) NEG   Final     Comment:     (NOTE)  Positive, unable to confirm with Ictotest.       Blood   Date Value Ref Range Status   12/02/2021 MODERATE (A) NEG   Final     Urobilinogen   Date Value Ref Range Status 12/02/2021 0.2 0.2 - 1.0 EU/dL Final     Nitrites   Date Value Ref Range Status   12/02/2021 Positive (A) NEG   Final     Leukocyte Esterase   Date Value Ref Range Status   12/02/2021 LARGE (A) NEG   Final     Potassium   Date Value Ref Range Status   12/01/2021 5.9 (H) 3.5 - 5.5 mmol/L Final     Creatinine   Date Value Ref Range Status   12/01/2021 6.51 (H) 0.6 - 1.3 MG/DL Final     BUN   Date Value Ref Range Status   12/01/2021 52 (H) 7.0 - 18 MG/DL Final      Coagulation Lab Results   Component Value Date/Time    Prothrombin time 17.0 (H) 11/12/2021 03:20 PM    Prothrombin time 13.6 10/08/2021 03:50 PM    INR 1.4 (H) 11/12/2021 03:20 PM    INR 1.1 10/08/2021 03:50 PM    aPTT 35.4 10/14/2020 11:56 AM    aPTT 46.9 (H) 07/16/2020 04:52 PM

## 2021-12-02 NOTE — PROGRESS NOTES
SHIFT CHANGE NOTE FOR [facility]    Bedside and Verbal shift change report given to Nathan Romero RN (oncoming nurse) by Karin Burch RN (offgoing nurse). Report included the following information SBAR, Kardex, MAR and Recent Results.     Situation:   Code Status: DNR   Hospital Day: 13   Problem List:   Hospital Problems  Date Reviewed: 12/1/2021          Codes Class Noted POA    Hyperkalemia ICD-10-CM: E87.5  ICD-9-CM: 276.7  11/29/2021 No        Mononeuropathy (Chronic) ICD-10-CM: G58.9  ICD-9-CM: 355.9  Unknown Yes    Overview Signed 11/28/2021 11:29 AM by Maryuri Manriquez MD     Involving ring finger of left hand             * (Principal) Multiple closed pelvic fractures without disruption of pelvic ring ([facility] [address] ICD-10-CM: O79.85IF  ICD-9-CM: 808.44  11/19/2021 Yes        Hyperphosphatemia ICD-10-CM: E83.39  ICD-9-CM: 275.3  11/14/2021 Yes        Anticoagulated by anticoagulation treatment ICD-10-CM: Z79.01  ICD-9-CM: V58.61  Unknown Yes    Overview Signed 11/23/2021  9:49 AM by Maryuri Manriquez MD     On Apixaban             Paroxysmal atrial fibrillation (HCC) (Chronic) ICD-10-CM: I48.0  ICD-9-CM: 427.31  Unknown Yes        Closed fracture of left inferior pubic ramus, with routine healing, subsequent encounter ICD-10-CM: S32.592D  ICD-9-CM: V54.13  11/12/2021 Yes        Closed nondisplaced fracture of anterior wall of left acetabulum with routine healing ICD-10-CM: S32.415D  ICD-9-CM: V54.19  11/12/2021 Yes        Closed fracture of superior pubic ramus, left, with routine healing, subsequent encounter ICD-10-CM: S32.512D  ICD-9-CM: V54.19  11/12/2021 Yes        History of infection with vancomycin resistant Enterococcus (VRE) ICD-10-CM: L37.13  ICD-9-CM: V12.09  10/8/2021 Yes    Overview Signed 11/23/2021  9:51 AM by Maryuri Manriquez MD     Urine culture (collected 10/8/2021, resulted 10/14/2021) yielded growth of >100,000 colonies/ml of Enterococcus faecalis RESISTANT to Ciprofloxacin, Levofloxacin, Tetracycline and Vancomycin             History of urinary tract infection ICD-10-CM: Z87.440  ICD-9-CM: V13.02  10/8/2021 Yes    Overview Signed 11/23/2021  9:52 AM by Ignacia Pérez MD     Urine culture (collected 10/8/2021, resulted 10/14/2021) yielded growth of >100,000 colonies/ml of Enterococcus faecalis RESISTANT to Ciprofloxacin, Levofloxacin, Tetracycline and Vancomycin             Need for prophylactic isolation ICD-10-CM: Z41.8  ICD-9-CM: V07.0  10/8/2021 Yes    Overview Signed 11/23/2021  9:52 AM by Ignacia Pérez MD     Urine culture (collected 10/8/2021, resulted 10/14/2021) yielded growth of >100,000 colonies/ml of Enterococcus faecalis RESISTANT to Ciprofloxacin, Levofloxacin, Tetracycline and Vancomycin             End-stage renal disease on hemodialysis (HCC) (Chronic) ICD-10-CM: N18.6, Z99.2  ICD-9-CM: 585.6, V45.11  Unknown Yes    Overview Signed 11/23/2021  9:56 AM by Ignacia Pérez MD     HD at [facility] on [facility] on MWF.  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 ([facility] [address] (Chronic) ICD-10-CM: E11.22, N18.6  ICD-9-CM: 250.40, 585.6  Unknown Yes    Overview Signed 11/23/2021  9:50 AM by Ignacia Pérez MD     HbA1c (10/8/2021) = 4.6             Chronic hypotension (Chronic) ICD-10-CM: I95.89  ICD-9-CM: 458.1  Unknown Yes    Overview Signed 11/23/2021 10:01 AM by Ignacia Pérez MD     On Midodrine                   Background:   Past Medical History:   Past Medical History:   Diagnosis Date    Anemia in end-stage renal disease ([facility] [address]     Anticoagulated by anticoagulation treatment     On Apixaban    Chronic hypotension     On Midodrine    Chronic pain     Closed fracture of left inferior pubic ramus, with routine healing, subsequent encounter 11/12/2021    Closed fracture of superior pubic ramus, left, with routine healing, subsequent encounter 11/12/2021    Closed nondisplaced fracture of anterior wall of left acetabulum with routine healing 11/12/2021    Depression     End-stage renal disease on hemodialysis ([facility] [address]     HD at [facility] on [facility] on MWF. Tel # [phone]    Gastroesophageal reflux disease     Glaucoma     History of acute pyelonephritis 2/20/2020    History of hydronephrosis 10/5/2021    History of infection with vancomycin resistant Enterococcus (VRE) 10/8/2021    Urine culture (collected 10/8/2021, resulted 10/14/2021) yielded growth of >100,000 colonies/ml of Enterococcus faecalis RESISTANT to Ciprofloxacin, Levofloxacin, Tetracycline and Vancomycin    History of kidney stones     History of recurrent urinary tract infection     History of sepsis 6/18/2021    History of septic shock 10/8/2021    History of urethral stricture     History of urinary tract infection 10/8/2021    Urine culture (collected 10/8/2021, resulted 10/14/2021) yielded growth of >100,000 colonies/ml of Enterococcus faecalis RESISTANT to Ciprofloxacin, Levofloxacin, Tetracycline and Vancomycin    Hyperlipidemia     Hyperphosphatemia 11/14/2021    Hypothyroidism     Lung mass     Mononeuropathy     Involving ring finger of left hand    Need for prophylactic isolation 10/8/2021    Urine culture (collected 10/8/2021, resulted 10/14/2021) yielded growth of >100,000 colonies/ml of Enterococcus faecalis RESISTANT to Ciprofloxacin, Levofloxacin, Tetracycline and Vancomycin    Paroxysmal atrial fibrillation (HCC)     Secondary hyperparathyroidism of renal origin ([facility] [address]     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 ([facility] [address]     HbA1c (10/8/2021) = 4.6    Uric acid nephrolithiasis     Urinary incontinence         Assessment:   Changes in Assessment throughout shift: No change to previous assessment     Patient has a central line: no Reasons if yes: Insertion date: Last dressing date:   Patient has Chua Cath: no Reasons if yes:     Insertion date:  Shift chua care completed:      Last Vitals:     Vitals:    12/01/21 1811 12/01/21 2031 12/01/21 2149 12/02/21 0104   BP: (!) 109/58 92/62 (!) 99/56 (!) 100/55   Pulse: 85 72 70 77   Resp: 18 18 16 18   Temp: 98.1 °F (36.7 °C) 97.5 °F (36.4 °C)     TempSrc: Oral      SpO2:  93% 97% 97%   Weight:       Height:            PAIN    Pain Assessment    Pain Intensity 1: 0 (12/02/21 0337) Pain Intensity 1: 2 (12/29/14 1105)    Pain Location 1: Leg Pain Location 1: Abdomen    Pain Intervention(s) 1: Medication (see MAR) Pain Intervention(s) 1: Medication (see MAR)  Patient Stated Pain Goal: 0 Patient Stated Pain Goal: 0  o Intervention effective: yes  o Other actions taken for pain: Medication (see MAR)     Skin Assessment  Skin color Skin Color: Appropriate for ethnicity  Condition/Temperature Skin Condition/Temp: Dry, Warm  Integrity Skin Integrity: Scars (comment) (BILATERAL KNEES)  Turgor Turgor: Non-tenting  Weekly Pressure Ulcer Documentation  Pressure  Injury Documentation: No Pressure Injury Noted-Pressure Ulcer Prevention Initiated  Wound Prevention & Protection Methods  Orientation of wound Orientation of Wound Prevention: Posterior  Location of Prevention Location of Wound Prevention: Sacrum/Coccyx  Dressing Present Dressing Present : No  Dressing Status    Wound Offloading Wound Offloading (Prevention Methods): Bed, pressure reduction mattress     INTAKE/OUPUT  Date 12/01/21 0700 - 12/02/21 0659 12/02/21 0700 - 12/03/21 0659   Shift 9882-8515 7533-7263 24 Hour Total 0094-4087 1051-9292 24 Hour Total   INTAKE   P.O. 120 480 600        P. O. 120 480 600      Shift Total(mL/kg) 120(1.3) 480(5.1) 600(6.3)      OUTPUT   Urine(mL/kg/hr)  0 0        Urine Voided  0 0        Urine Occurrence(s) 3 x 0 x 3 x      Stool           Stool Occurrence(s) 0 x 0 x 0 x      Dialysis 1650  1650        NET Fluid Removed (mL) 1650  1650      Shift Total(mL/kg) 1650(17.4) 0(0) 1650(17.4)      NET -1530 480 -1050      Weight (kg) 95 95 95 95 95 95       Recommendations:  1. Patient needs and requests: none at this time    2.  Pending tests/procedures: labs    3. Functional Level/Equipment: Partial (one person) / [place]    Fall Precautions:   Fall risk precautions were reinforced with the patient; she was instructed to call for help prior to getting up. The following fall risk precautions were continued: bed/ chair alarms, door signage, yellow bracelet and socks as well as update of the [place] tool in the patient's room. Freddy Score: 4    HEALS Safety Check    A safety check occurred in the patient's room between off going nurse and oncoming nurse listed above. The safety check included the below items  Area Items   H  High Alert Medications - Verify all high alert medication drips (heparin, PCA, etc.)   E  Equipment - Suction is set up for ALL patients (with adelaide)  - Red plugs utilized for all equipment (IV pumps, etc.)  - WOWs wiped down at end of shift.  - Room stocked with oxygen, suction, and other unit-specific supplies   A  Alarms - Bed alarm is set for fall risk patients  - Ensure chair alarm is in place and activated if patient is up in a chair   L  Lines - Check IV for any infiltration  - Cline bag is empty if patient has a Cline   - Tubing and IV bags are labeled   S  Safety   - Room is clean, patient is clean, and equipment is clean. - Hallways are clear from equipment besides carts. - Fall bracelet on for fall risk patients  - Ensure room is clear and free of clutter  - Suction is set up for ALL patients (with adelaide)  - Hallways are clear from equipment besides carts.    - Isolation precautions followed, supplies available outside room, sign posted     Kalpana Simms RN

## 2021-12-02 NOTE — PROGRESS NOTES
Problem: Risk for Spread of Infection  Goal: Prevent transmission of infectious organism to others  Description: Prevent the transmission of infectious organisms to other patients, staff members, and visitors. Outcome: Progressing Towards Goal     Problem: Patient Education:  Go to Education Activity  Goal: Patient/Family Education  Outcome: Progressing Towards Goal     Problem: Falls - Risk of  Goal: *Absence of Falls  Description: Document [place] Fall Risk and appropriate interventions in the flowsheet.   Outcome: Progressing Towards Goal  Note: Fall Risk Interventions:  Mobility Interventions: Assess mobility with egress test, Bed/chair exit alarm, Patient to call before getting OOB, Utilize walker, cane, or other assistive device, Utilize gait belt for transfers/ambulation    Mentation Interventions: Adequate sleep, hydration, pain control, Bed/chair exit alarm, Door open when patient unattended, Gait belt with transfers/ambulation, Increase mobility, Self-releasing belt, Toileting rounds, Update white board    Medication Interventions: Assess postural VS orthostatic hypotension, Bed/chair exit alarm, Patient to call before getting OOB, Teach patient to arise slowly, Utilize gait belt for transfers/ambulation    Elimination Interventions: Bed/chair exit alarm, Call light in reach, Elevated toilet seat, Patient to call for help with toileting needs, Stay With Me (per policy), Toilet paper/wipes in reach, Toileting schedule/hourly rounds    History of Falls Interventions: Bed/chair exit alarm, Door open when patient unattended, Investigate reason for fall, Room close to nurse's station, Utilize gait belt for transfer/ambulation         Problem: Patient Education: Go to Patient Education Activity  Goal: Patient/Family Education  Outcome: Progressing Towards Goal     Problem: Pressure Injury - Risk of  Goal: *Prevention of pressure injury  Description: Document Giovany Scale and appropriate interventions in the flowsheet.   Outcome: Progressing Towards Goal  Note: Pressure Injury Interventions:  Sensory Interventions: Assess changes in LOC, Assess need for specialty bed, Chair cushion, Check visual cues for pain, Float heels, Keep linens dry and wrinkle-free, Maintain/enhance activity level, Minimize linen layers, Pressure redistribution bed/mattress (bed type)    Moisture Interventions: Absorbent underpads, Apply protective barrier, creams and emollients, Maintain skin hydration (lotion/cream), Minimize layers, Moisture barrier    Activity Interventions: Chair cushion, Increase time out of bed, Pressure redistribution bed/mattress(bed type), Assess need for specialty bed    Mobility Interventions: Assess need for specialty bed, Chair cushion, Float heels, HOB 30 degrees or less, Pressure redistribution bed/mattress (bed type)    Nutrition Interventions: Document food/fluid/supplement intake    Friction and Shear Interventions: Apply protective barrier, creams and emollients, Feet elevated on foot rest, HOB 30 degrees or less, Minimize layers                Problem: Patient Education: Go to Patient Education Activity  Goal: Patient/Family Education  Outcome: Progressing Towards Goal     Problem: Pain  Goal: *Control of Pain  Outcome: Progressing Towards Goal     Problem: Patient Education: Go to Patient Education Activity  Goal: Patient/Family Education  Outcome: Progressing Towards Goal

## 2021-12-02 NOTE — PROGRESS NOTES
[facility] PHYSICAL REHABILITATION  [address] [place] [address], [address]     INPATIENT REHABILITATION  DAILY PROGRESS NOTE     Date: 12/2/2021    Name: Mohsen Henderson Age / Sex: 64 y.o. / female   CSN: [id number] MRN: [id number]   6 [facility] Date: 11/19/2021 Length of Stay: 13 days     Primary Rehab Diagnosis: Impaired Mobility and ADLs secondary to Multiple closed pelvic fractures (comminuted fracture involving the left anterior acetabular wall with involvement of the base of the left superior pubic ramus which are not significantly displaced; nondisplaced left inferior pubic ramus fracture)      Subjective:     Patient seen and examined. Blood pressure controlled. No fever over the past 24 hours.     Patient's Complaint:   Urinary problems (blood reported in urine)    Pain Control: stable, mild-to-moderate joint symptoms intermittently, reasonably well controlled by current meds      Objective:     Vital Signs:  Patient Vitals for the past 24 hrs:   BP Temp Temp src Pulse Resp SpO2   12/02/21 0727 121/67 (!) 96.7 °F (35.9 °C) -- 79 16 97 %   12/02/21 0104 (!) 100/55 -- -- 77 18 97 %   12/01/21 2149 (!) 99/56 -- -- 70 16 97 %   12/01/21 2031 92/62 97.5 °F (36.4 °C) -- 72 18 93 %   12/01/21 1811 (!) 109/58 98.1 °F (36.7 °C) Oral 85 18 --   12/01/21 1756 (!) 94/51 -- -- 84 -- --   12/01/21 1745 (!) 108/56 -- -- 96 -- --   12/01/21 1730 (!) 103/54 -- -- 84 -- --   12/01/21 1715 (!) 109/56 -- -- 83 -- --   12/01/21 1700 (!) 98/52 -- -- 82 -- --   12/01/21 1645 101/62 -- -- 84 -- --   12/01/21 1630 (!) 108/54 -- -- 83 -- --   12/01/21 1615 (!) 116/52 -- -- 72 -- --   12/01/21 1600 (!) 116/52 -- -- 72 -- --   12/01/21 1545 (!) 105/53 -- -- 69 -- --   12/01/21 1530 (!) 104/55 -- -- 69 -- --   12/01/21 1515 115/63 -- -- 70 -- --   12/01/21 1500 (!) 116/48 -- -- (!) 47 -- --   12/01/21 1453 (!) 122/53 -- -- (!) 52 -- --   12/01/21 1450 (!) 124/51 97.5 °F (36.4 °C) Oral (!) 52 18 --   12/01/21 1225 (!) 114/59 -- -- 68 -- --        Physical Examination:  GENERAL SURVEY: Patient is awake, alert, oriented x 3, sitting comfortably on the chair, not in acute respiratory distress. HEENT: pink palpebral conjunctivae, anicteric sclerae, no nasoaural discharge, moist oral mucosa  NECK: supple, no jugular venous distention, no palpable lymph nodes  CHEST/LUNGS: symmetrical chest expansion, good air entry, clear breath sounds  HEART: adynamic precordium, good S1 S2, no S3, regular rhythm, no murmurs  ABDOMEN: obese, bowel sounds appreciated, soft, non-tender  EXTREMITIES: pink nailbeds, no edema, full and equal pulses, no calf tenderness   NEUROLOGICAL EXAM: The patient is awake, alert and oriented x3, able to answer questions fairly appropriately, able to follow 1 and 2 step commands. Able to tell time from the wall clock. Cranial nerves II-XII are grossly intact. No gross sensory deficit. Motor strength is 4 to 4+/5 on BUE and RLE, 2+ to 3-/5 on the left hip, 3 to 3+/5 on the left knee and left ankle.        Current Medications:  Current Facility-Administered Medications   Medication Dose Route Frequency    sodium zirconium cyclosilicate (LOKELMA) powder packet 5 g  5 g Oral DAILY    sevelamer carbonate (RENVELA) tab 1,600 mg  1,600 mg Oral TID WITH MEALS    lidocaine-prilocaine (EMLA) 2.5-2.5 % cream   Topical BID    midodrine (PROAMATINE) tablet 5 mg  5 mg Oral TID WITH MEALS    allopurinoL (ZYLOPRIM) tablet 100 mg  100 mg Oral Q MON, WED & FRI    epoetin caitlin-epbx (RETACRIT) injection 8,000 Units  8,000 Units SubCUTAneous Q MON, WED & FRI    cetirizine (ZYRTEC) tablet 10 mg  10 mg Oral DAILY    acetaminophen (TYLENOL) tablet 650 mg  650 mg Oral Q4H PRN    bisacodyL (DULCOLAX) tablet 10 mg  10 mg Oral Q48H PRN    amiodarone (CORDARONE) tablet 200 mg  200 mg Oral DAILY    acetaminophen (TYLENOL) tablet 650 mg  650 mg Oral TID    apixaban (ELIQUIS) tablet 5 mg  5 mg Oral BID    HYDROmorphone (DILAUDID) tablet 1 mg  1 mg Oral Q8H PRN    meclizine (ANTIVERT) tablet 12.5 mg  12.5 mg Oral TID PRN    DULoxetine (CYMBALTA) capsule 20 mg  20 mg Oral DAILY    vitamin B complex-folic acid 0.4 mg tablet  1 Tablet Oral DAILY    cyanocobalamin tablet 1,000 mcg  1,000 mcg Oral DAILY    L. acidophilus,casei,rhamnosus (BIO-K PLUS) capsule 1 Capsule  1 Capsule Oral DAILY    ascorbic acid (vitamin C) (VITAMIN C) tablet 500 mg  500 mg Oral DAILY    cholecalciferol (VITAMIN D3) (1000 Units /25 mcg) tablet 2,000 Units  2,000 Units Oral BID    latanoprost (XALATAN) 0.005 % ophthalmic solution 1 Drop  1 Drop Both Eyes QPM    levothyroxine (SYNTHROID) tablet 125 mcg  125 mcg Oral 6am    pantoprazole (PROTONIX) tablet 20 mg  20 mg Oral ACB    ondansetron (ZOFRAN ODT) tablet 4 mg  4 mg Oral TID PRN    lidocaine 4 % patch 1 Patch  1 Patch TransDERmal Q24H    gabapentin (NEURONTIN) capsule 200 mg  200 mg Oral Q MON, WED & FRI    doxercalciferoL (HECTOROL) 4 mcg/2 mL injection 4 mcg  4 mcg IntraVENous DIALYSIS MON, WED & FRI       Allergies:   Allergies   Allergen Reactions    Albumin, Human 25 % Itching     Headache - severe migraine like, itchy eyes, runny nose    Ciprofloxacin Hives    Cyclopentolate Unknown (comments)    Iron Sucrose Diarrhea    Statins-Hmg-Coa Reductase Inhibitors Other (comments)     Body ache       Functional Progress:    PHYSICAL THERAPY    ON ADMISSION MOST RECENT   Wheelchair Mobility/Management  Able to Propel (ft): 230 feet  Functional Level: 4  Curbs/Ramps Assist Required (FIM Score): 0 (Not tested)  Wheelchair Setup Assist Required : 2 (Maximal assistance)  Wheelchair Management: Manages left brake, Manages right brake (assistance with armrests, footrests) Wheelchair Mobility/Management  Able to Propel (ft): 232 feet  Functional Level: 6  Curbs/Ramps Assist Required (FIM Score): 0 (Not tested)  Wheelchair Setup Assist Required : 5 (Supervision/setup)  Wheelchair Management: Manages left brake, Manages right brake     Gait  Amount of Assistance: 0 (Not tested)  Distance (ft): 0 Feet (ft)  Assistive Device:  (NT) Gait  Amount of Assistance: 6 (Modified independent)  Distance (ft): 306 Feet (ft)  Assistive Device: Gait belt, Walker, rolling     Balance-Sitting/Standing  Sitting - Static: Good (unsupported)  Sitting - Dynamic: Good (unsupported)  Standing - Static: Fair, Occasional, Poor (using bilat UE support of RW, occasionally one UE support)  Standing - Dynamic : Impaired Balance-Sitting/Standing  Sitting - Static: Good (unsupported)  Sitting - Dynamic: Good (unsupported)  Standing - Static: Fair  Standing - Dynamic : Impaired     Bed/Mat Mobility  Rolling Right : 4 (Minimal assistance)  Rolling Left : 4 (Minimal assistance)  Supine to Sit : 3 (Moderate assistance) (flat head of bed, no railings, needing minimal trunk support)  Sit to Supine : 3 (Moderate assistance) (head of bed flat, no rails, assist w/ left LE and reposition) Bed/Mat Mobility  Rolling Right : 5 (Supervision)  Rolling Left : 5 (Supervision)  Supine to Sit : 5 (Supervision)  Sit to Supine : 5 (Supervision)     Transfers  Transfer Type: SPT with walker (mod/max A due to loss of balance and needing recovery)  Other: lateral transfer with transfer board (needing mod/max A)  Transfer Assistance :  (mod/max A for stabilization and for maintaining TTWB left LE)  Sit to Stand Assistance: Moderate assistance (lifting assistance, support left LE to ensure TTWB)  Car Transfers: Not tested  Car Type: NT Transfers  Transfer Type:  Other  Other: stand step without AD  Transfer Assistance : 6 (Modified independent)  Sit to Stand Assistance: Modified independent  Car Transfers:  (CGA)  Car Type: Car Transfer Trainer     Steps or Stairs  Steps/Stairs Ambulated (#): 0  Level of Assist : 0 (Not tested)  Rail Use:  (NT) Steps or Stairs  Steps/Stairs Ambulated (#): 4 (6\")  Level of Assist : 5 (Supervision/setup) (close supervision)  Rail Use: Left  (for 2 steps, Bilateral for 2 steps)         Lab/Data Review:  Recent Results (from the past 24 hour(s))   URINALYSIS W/ RFLX MICROSCOPIC    Collection Time: 12/02/21  6:56 AM   Result Value Ref Range    Color DARK YELLOW      Appearance TURBID      Specific gravity 1.022 1.005 - 1.030      pH (UA) 6.5 5.0 - 8.0      Protein 100 (A) NEG mg/dL    Glucose Negative NEG mg/dL    Ketone Negative NEG mg/dL    Bilirubin SMALL (A) NEG      Blood MODERATE (A) NEG      Urobilinogen 0.2 0.2 - 1.0 EU/dL    Nitrites Positive (A) NEG      Leukocyte Esterase LARGE (A) NEG     URINE MICROSCOPIC ONLY    Collection Time: 12/02/21  6:56 AM   Result Value Ref Range    WBC  0 - 5 /hpf     UNABLE TO QUANTITATE MICROSCOPIC PARAMETERS DUE TO EXCESSIVE RBCS    RBC TOO NUMEROUS TO COUNT 0 - 5 /hpf    Epithelial cells FEW 0 - 5 /lpf    Bacteria 4+ (A) NEG /hpf       Assessment:     Primary Rehabilitation Diagnosis  1.  Impaired Mobility and ADLs  2. 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 ([facility]) E11.22, N18.6    Secondary hyperparathyroidism of renal origin ([facility] N25.81    Gastroesophageal reflux disease K21.9    History of recurrent urinary tract infection Z87.440    History of sepsis Z86.19    End-stage renal disease on hemodialysis ([facility]) N18.6, Z99.2    History of hydronephrosis Z87.448    History of septic shock Z86.19    Anticoagulated by anticoagulation treatment Z79.01    Paroxysmal atrial fibrillation ([facility]) I48.0  Closed fracture of left inferior pubic ramus, with routine healing, subsequent encounter S32.592D    Closed nondisplaced fracture of anterior wall of left acetabulum with routine healing S32.415D    Closed fracture of superior pubic ramus, left, with routine healing, subsequent encounter S32.512D    Multiple closed pelvic fractures without disruption of pelvic ring (HCC) S32.82XA    Depression F32. A    History of urinary tract infection Z87.440    Need for prophylactic isolation Z41.8    Hyperlipidemia E78.5    Hypothyroidism E03.9    History of kidney stones Z87.442    Chronic pain G89.29    Lung mass R91.8    History of urethral stricture Z87.448    Uric acid nephrolithiasis N20.0    Urinary incontinence R32    Glaucoma H40.9    Hyperphosphatemia E83.39    Mononeuropathy G58.9    Hyperkalemia E87.5    Gross hematuria R31.0       Plan:     1. Justification for continued stay: Good progression towards established rehabilitation goals. 2. Medical Issues being followed closely:    [x]  Fall and safety precautions     []  Wound Care     [x]  Bowel and Bladder Function     [x]  Fluid Electrolyte and Nutrition Balance     [x]  Pain Control      3. Issues that 24 hour rehabilitation nursing is following:    [x]  Fall and safety precautions     []  Wound Care     [x]  Bowel and Bladder Function     [x]  Fluid Electrolyte and Nutrition Balance     [x]  Pain Control      [x]  Assistance with and education on in-room safety with transfers to and from the bed, wheelchair, toilet and shower. 4. Acute rehabilitation plan of care:    [x]  Continue current care and rehab. [x]  Physical Therapy           [x]  Occupational Therapy           []  Speech Therapy     []  Hold Rehab until further notice     5. Medications:    [x]  MAR Reviewed     [x]  Continue Present Medications     6.  DVT Prophylaxis:      []  Enoxaparin     []  Unfractionated Heparin     []  Warfarin     [x]  NOAC     []  AMELIE Stockings     []  Sequential Compression Device     []  None     7. Code status    []  Full code     []  Partial code     []  Do not intubate     [x]  Do not resuscitate     8.  Orders:   > Multiple closed pelvic fractures (comminuted fracture involving the left anterior acetabular wall with involvement of the base of the left superior pubic ramus which are not significantly displaced; nondisplaced left inferior pubic ramus fracture)   > Orthopedic surgery recommending non-surgical management with PT, pain control   > Partial (50%) weightbearing on the left lower extremity     > Urinary tract infection, History of right ureteral stent   > Urine culture (collected 11/16/2021, resulted 11/20/2021) yielded growth of >100,000 colonies/ml of Proteus mirabilis RESISTANT to Nitrofurantoin   > Started on IV ceftriaxone and transitioned to cefpodoxime   > On discharge, patient is to follow up with Urology (Dr. Liz Vaughn)   > On 11/25/2021, patient had completed the recommended treatment course of Cefpodoxime 200 mg PO q 24 hr     > History of VRE urinary tract infection, 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)   > Urine culture (collected 10/8/2021, resulted 10/14/2021) yielded growth of >100,000 colonies/ml of Enterococcus faecalis RESISTANT to Ciprofloxacin, Levofloxacin, Tetracycline and Vancomycin   > Contact isolation    > Paroxysmal atrial fibrillation, anticoagulated on Apixaban   > Anticoagulation    > Continue Apixaban 5 mg PO BID    > Rate-control    > None   > Maintenance of sinus rhythm    > Continue Amiodarone 200 mg PO once daily    > Glaucoma    > Continue Latanoprost 0.005% ophthalmic solution, 1 drop into each ete q PM    > Hypothyroidism   > TSH (11/13/2021) = 11.4   > TSH (11/22/2021) = 1.69   > Free T4 (11/22/2021) = 1.1   > Continue Levothyroxine 125 mcg PO once daily     > Chronic hypotension   > On 11/23/2021, decreased Midodrine from 10 mg to 5 mg PO TID with meals   > Continue Midodrine 5 mg PO TID with meals     > End-stage renal disease, on hemodialysis (Mon-Wed-Fri); Secondary hyperparathyroidism of renal origin   > Nephrology consult (Dr. 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 Allergy to albumin   > Will list as allergy per pt and daughter-in-law request.  Start antihistamine.   Monitor.    > Hyperkalemia      11/24/21  0539 11/22/21  0527 11/19/21  0124 11/18/21  0143 11/17/21  0145 11/16/21  0232 11/15/21  0216 11/14/21  0424 11/13/21  0445 11/12/21  1520   K 5.7* 5.4 4.8 4.3 4.7 4.6 4.8 4.4 4.7 3.5      > On 11/24/2021, patient was given Sodium zirconium cyclosilicate 10 grams PO x 1 dose      11/29/21  1008 11/26/21  0635   K 6.2* 5.1      > On 11/29/2021, patient was given Sodium zirconium cyclosilicate 10 grams PO x 1 dose    > K (12/1/2021) = 5.9   > Start Sodium zirconium cyclosilicate 5 grams PO once daily    > Hyperphosphatemia      11/26/21  0635 11/24/21  0539 11/22/21  0527 11/14/21  0424   PHOS 7.2* 8.1* 8.1* 6.0*      > On 11/26/2021, started Calcium acetate 667 mg PO TID with meals   > P (11/29/2021) = 8.4   > On 11/29/2021, increased Calcium acetate from 667 mg to 1,334 mg PO TID with meals --> changed to Sevelamer 1600 mg PO TID with meals   > P (12/1/2021) = 7.2   > Continue Sevelamer 1600 mg PO TID with meals    > 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   > On 11/28/2021, started Lidocaine-Prilocaine 2.5-2.5% cream, applied to palmar surface of ring finger of left hand BID   > Continue:    > Duloxetine 20 mg PO once daily    > Gabapentin 200 mg PO q Mon-Wed-Fri    > Lidocaine-Prilocaine 2.5-2.5% cream, applied to palmar surface of ring finger of left hand BID    > Gross hematuria; History of recurrent urinary tract infection; History of hydronephrosis; History of kidney stones; History of right ureteral stent      12/01/21  0622 11/29/21  0539 11/26/21  0635 11/24/21  0539 11/22/21  0527 11/19/21  0124   WBC 5.7 8.1 8.0 6.3 10.1 8.7      > No documented fever since admission   > Urinalysis (12/2/2021) showed turbid urine, blood moderate, bilirubin small, nitrites positive, leukocyte esterase large, WBC unable to quantify, RBC TNTC, bacteria 4+   > Urine culture (collected 12/2/2021)    > Retroperitoneal ultrasound (12/2/2021) showeD:    > Atrophic echogenic kidneys, consistent with chronic medical renal disease.    > Bilateral renal cysts.     > Bladder not well visualized, likely collapsed.   > Doubt infection; no antibiotics for now   > Urology consult ([facility]) called for evaluation and comanagement    > Analgesia / Chronic low back pain   > Continue:    > Acetaminophen 650 mg PO TID (8AM, 12PM, 4PM)    > Acetaminophen 650 mg PO q 4 hr PRN for pain level 4/10 or lesser (from 8PM to 4AM only)    > Lidocaine 4% patch, 1 patch on affected area q 24 hr (12 hr on, 12 hr off)    > Hydromorphone 1 mg PO q 8 hr PRN for pain level 5/10 or greater     > Diet:   > Specifications: 3 carb choices (45 gm/meal); Low fat/Low cholesterol/High fiber/IRINEO; Low potassium (less than 3,000 mg/day); Low phosphorus (less than 1,000 mg/day)   > Solids (consistency): Regular   > Liquids (consistency): Thin   > Fluid restriction: None       9. Personal Protective Equipment (N95 face mask and eye goggles) was used while interacting with the patient. Patient was using a surgical mask. 10. Patient's progress in rehabilitation and medical issues discussed with the patient.  All questions answered to the best of my ability. Care plan discussed with patient and nurse. 11. Total clinical care time is 30 minutes, including review of chart including all labs, radiology, past medical history, and discussion with patient. Greater than 50% of my time was spent in coordination of care and counseling. 12. Discharge Planning:  Discharge date  12/3/2021 (Friday)   Discharge location  [x] Home     [] [address]    [] Other:    Follow-up services  [] Outpatient      [x] Home Health       [x] Physical Therapy              [x] Occupational Therapy       [] Speech Therapy                [] Medical Social Worker    [] Aide   [] Skilled Nursing           [] Medication reconciliation        [] Disease education        [] PT/INR monitoring        [] Routine PICC line care        [] IV antibiotic administration            Antibiotic:             Stop date:        [] Tube feeding        [] Indwelling chua catheter care        [] Wound Care/Dressing             Instructions:       [] Other:      Follow-up appointments  1. PCP (Dr. Lindsay Bills)   2. Orthopedics (Dr. Eliana Aparicio)   3.  Urology (Dr. Bernard Xiao)        Signed:    Toño Keith MD    December 2, 2021

## 2021-12-02 NOTE — PROGRESS NOTES
Problem: Risk for Spread of Infection  Goal: Prevent transmission of infectious organism to others  Description: Prevent the transmission of infectious organisms to other patients, staff members, and visitors. Outcome: Progressing Towards Goal     Problem: Patient Education:  Go to Education Activity  Goal: Patient/Family Education  Outcome: Progressing Towards Goal     Problem: Falls - Risk of  Goal: *Absence of Falls  Description: Document Leafy Campo Fall Risk and appropriate interventions in the flowsheet.   Outcome: Progressing Towards Goal  Note: Fall Risk Interventions:  Mobility Interventions: Assess mobility with egress test, Bed/chair exit alarm, Patient to call before getting OOB, Utilize walker, cane, or other assistive device, Utilize gait belt for transfers/ambulation    Mentation Interventions: Adequate sleep, hydration, pain control, Bed/chair exit alarm, Door open when patient unattended, Gait belt with transfers/ambulation, Increase mobility, Self-releasing belt, Toileting rounds, Update white board    Medication Interventions: Assess postural VS orthostatic hypotension, Bed/chair exit alarm, Patient to call before getting OOB, Teach patient to arise slowly, Utilize gait belt for transfers/ambulation    Elimination Interventions: Bed/chair exit alarm, Call light in reach, Elevated toilet seat, Patient to call for help with toileting needs, Stay With Me (per policy), Toilet paper/wipes in reach, Toileting schedule/hourly rounds    History of Falls Interventions: Bed/chair exit alarm, Door open when patient unattended, Investigate reason for fall, Room close to nurse's station, Utilize gait belt for transfer/ambulation         Problem: Patient Education: Go to Patient Education Activity  Goal: Patient/Family Education  Outcome: Progressing Towards Goal     Problem: Pressure Injury - Risk of  Goal: *Prevention of pressure injury  Description: Document Giovany Scale and appropriate interventions in the flowsheet.   Outcome: Progressing Towards Goal  Note: Pressure Injury Interventions:  Sensory Interventions: Assess changes in LOC, Assess need for specialty bed, Chair cushion, Check visual cues for pain, Float heels, Keep linens dry and wrinkle-free, Maintain/enhance activity level, Minimize linen layers, Pressure redistribution bed/mattress (bed type)    Moisture Interventions: Absorbent underpads, Apply protective barrier, creams and emollients, Maintain skin hydration (lotion/cream), Minimize layers, Moisture barrier    Activity Interventions: Chair cushion, Increase time out of bed, Pressure redistribution bed/mattress(bed type), Assess need for specialty bed    Mobility Interventions: Assess need for specialty bed, Chair cushion, Float heels, HOB 30 degrees or less, Pressure redistribution bed/mattress (bed type)    Nutrition Interventions: Document food/fluid/supplement intake    Friction and Shear Interventions: Apply protective barrier, creams and emollients, Feet elevated on foot rest, HOB 30 degrees or less, Minimize layers                Problem: Patient Education: Go to Patient Education Activity  Goal: Patient/Family Education  Outcome: Progressing Towards Goal     Problem: Patient Education: Go to Patient Education Activity  Goal: Patient/Family Education  Outcome: Progressing Towards Goal     Problem: Patient Education: Go to Patient Education Activity  Goal: Patient/Family Education  Outcome: Progressing Towards Goal     Problem: Pain  Goal: *Control of Pain  Outcome: Progressing Towards Goal     Problem: Patient Education: Go to Patient Education Activity  Goal: Patient/Family Education  Outcome: Progressing Towards Goal     Problem: Inpatient Rehab (Adult)  Goal: *LTG: Avoids injury/falls 100% of time related to deficits  Outcome: Progressing Towards Goal  Goal: *LTG: Avoids infection 100% of time related to deficits  Outcome: Progressing Towards Goal  Goal: *LTG: Absence of DVT during hospitalization  Outcome: Progressing Towards Goal  Goal: *LTG: Maintains Skin Integrity With No Evidence of Pressure Injury 100% of Time  Outcome: Progressing Towards Goal  Goal: Interventions  Outcome: Progressing Towards Goal     Problem: Patient Education: Go to Patient Education Activity  Goal: Patient/Family Education  Outcome: Progressing Towards Goal

## 2021-12-03 LAB
ALBUMIN SERPL-MCNC: 3.5 G/DL (ref 3.4–5)
ANION GAP SERPL CALC-SCNC: 10 MMOL/L (ref 3–18)
BACTERIA SPEC CULT: NORMAL
BASOPHILS # BLD: 0.1 K/UL (ref 0–0.1)
BASOPHILS NFR BLD: 1 % (ref 0–2)
BUN SERPL-MCNC: 43 MG/DL (ref 7–18)
BUN/CREAT SERPL: 6 (ref 12–20)
CALCIUM SERPL-MCNC: 9.6 MG/DL (ref 8.5–10.1)
CC UR VC: NORMAL
CHLORIDE SERPL-SCNC: 100 MMOL/L (ref 100–111)
CO2 SERPL-SCNC: 26 MMOL/L (ref 21–32)
CREAT SERPL-MCNC: 6.73 MG/DL (ref 0.6–1.3)
DIFFERENTIAL METHOD BLD: ABNORMAL
EOSINOPHIL # BLD: 0.1 K/UL (ref 0–0.4)
EOSINOPHIL NFR BLD: 2 % (ref 0–5)
ERYTHROCYTE [DISTWIDTH] IN BLOOD BY AUTOMATED COUNT: 17.5 % (ref 11.6–14.5)
GLUCOSE SERPL-MCNC: 102 MG/DL (ref 74–99)
HCT VFR BLD AUTO: 33.3 % (ref 35–45)
HGB BLD-MCNC: 10 G/DL (ref 12–16)
IMM GRANULOCYTES # BLD AUTO: 0.1 K/UL (ref 0–0.04)
IMM GRANULOCYTES NFR BLD AUTO: 1 % (ref 0–0.5)
LYMPHOCYTES # BLD: 1.7 K/UL (ref 0.9–3.6)
LYMPHOCYTES NFR BLD: 28 % (ref 21–52)
MCH RBC QN AUTO: 30.2 PG (ref 24–34)
MCHC RBC AUTO-ENTMCNC: 30 G/DL (ref 31–37)
MCV RBC AUTO: 100.6 FL (ref 78–100)
MONOCYTES # BLD: 0.8 K/UL (ref 0.05–1.2)
MONOCYTES NFR BLD: 12 % (ref 3–10)
NEUTS SEG # BLD: 3.5 K/UL (ref 1.8–8)
NEUTS SEG NFR BLD: 57 % (ref 40–73)
NRBC # BLD: 0 K/UL (ref 0–0.01)
NRBC BLD-RTO: 0 PER 100 WBC
PHOSPHATE SERPL-MCNC: 6.9 MG/DL (ref 2.5–4.9)
PLATELET # BLD AUTO: 292 K/UL (ref 135–420)
PMV BLD AUTO: 9.4 FL (ref 9.2–11.8)
POTASSIUM SERPL-SCNC: 5 MMOL/L (ref 3.5–5.5)
RBC # BLD AUTO: 3.31 M/UL (ref 4.2–5.3)
SERVICE CMNT-IMP: NORMAL
SODIUM SERPL-SCNC: 136 MMOL/L (ref 136–145)
WBC # BLD AUTO: 6.2 K/UL (ref 4.6–13.2)

## 2021-12-03 PROCEDURE — 80069 RENAL FUNCTION PANEL: CPT

## 2021-12-03 PROCEDURE — 74011250636 HC RX REV CODE- 250/636: Performed by: INTERNAL MEDICINE

## 2021-12-03 PROCEDURE — 74011250637 HC RX REV CODE- 250/637: Performed by: INTERNAL MEDICINE

## 2021-12-03 PROCEDURE — 74011250637 HC RX REV CODE- 250/637: Performed by: FAMILY MEDICINE

## 2021-12-03 PROCEDURE — 99239 HOSP IP/OBS DSCHRG MGMT >30: CPT | Performed by: INTERNAL MEDICINE

## 2021-12-03 PROCEDURE — 36415 COLL VENOUS BLD VENIPUNCTURE: CPT

## 2021-12-03 PROCEDURE — 85025 COMPLETE CBC W/AUTO DIFF WBC: CPT

## 2021-12-03 PROCEDURE — 74011000250 HC RX REV CODE- 250: Performed by: INTERNAL MEDICINE

## 2021-12-03 PROCEDURE — 65310000000 HC RM PRIVATE REHAB

## 2021-12-03 PROCEDURE — 90935 HEMODIALYSIS ONE EVALUATION: CPT

## 2021-12-03 RX ADMIN — DULOXETINE HYDROCHLORIDE 20 MG: 20 CAPSULE, DELAYED RELEASE ORAL at 08:40

## 2021-12-03 RX ADMIN — APIXABAN 5 MG: 5 TABLET, FILM COATED ORAL at 08:40

## 2021-12-03 RX ADMIN — AMIODARONE HYDROCHLORIDE 200 MG: 200 TABLET ORAL at 08:40

## 2021-12-03 RX ADMIN — SODIUM ZIRCONIUM CYCLOSILICATE 5 G: 5 POWDER, FOR SUSPENSION ORAL at 08:41

## 2021-12-03 RX ADMIN — CETIRIZINE HYDROCHLORIDE 10 MG: 10 TABLET, FILM COATED ORAL at 08:40

## 2021-12-03 RX ADMIN — Medication 1 CAPSULE: at 08:40

## 2021-12-03 RX ADMIN — CYANOCOBALAMIN TAB 1000 MCG 1000 MCG: 1000 TAB at 08:40

## 2021-12-03 RX ADMIN — MIDODRINE HYDROCHLORIDE 5 MG: 5 TABLET ORAL at 08:40

## 2021-12-03 RX ADMIN — PANTOPRAZOLE SODIUM 20 MG: 20 TABLET, DELAYED RELEASE ORAL at 06:51

## 2021-12-03 RX ADMIN — Medication 1 TABLET: at 08:40

## 2021-12-03 RX ADMIN — Medication 500 MG: at 08:40

## 2021-12-03 RX ADMIN — LEVOTHYROXINE SODIUM 125 MCG: 125 TABLET ORAL at 06:51

## 2021-12-03 RX ADMIN — SEVELAMER CARBONATE 1600 MG: 800 TABLET, FILM COATED ORAL at 08:40

## 2021-12-03 RX ADMIN — CHOLECALCIFEROL TAB 25 MCG (1000 UNIT) 2000 UNITS: 25 TAB at 08:40

## 2021-12-03 RX ADMIN — ACETAMINOPHEN 650 MG: 325 TABLET ORAL at 08:40

## 2021-12-03 RX ADMIN — DOXERCALCIFEROL 4 MCG: 4 INJECTION, SOLUTION INTRAVENOUS at 13:00

## 2021-12-03 RX ADMIN — LIDOCAINE AND PRILOCAINE: 25; 25 CREAM TOPICAL at 08:41

## 2021-12-03 NOTE — PROGRESS NOTES
Urology Progress Note            Assessment/Plan:   ASSESSMENT:   Gross hematuria related to ureteral stent   Right distal ureteral stricture, managed with chronic stents exchanged Q6 mo               s/p ureteral stent placement              Hgb 10<8.8-10.0    US 12/2/21: no hydronephrosis. Atrophic kidneys. UTI, recurrent               UA positive nitrite, 4+ bacteria               WBC normal, afebrile               Prior culture 11/18 100K Proteus, resistant to macrobid              Completed course of Cefpodoxime 200 mg PO q 24 hr on 11/25/21    Repeat Ucx 12/2 pending   ESRD on HD- MWF              Still with significant urine output/production   Afib on Eliquis   HTN  DMII  Admitted to inpatient rehab unit following multiple pelvic fractures      PLAN:    Appreciate management per medicine   Encourage p.o fluids to help dilute urine as much as possible   Hematuria is nearly resolved. Urine yellow, slight blood tinge. Bleeding is expected/normal with presence of ureteral stent. Discussed with patient; as long as she remains voiding without difficulty and not retaining, no intervention is needed               Bladder scan PRN if patient is unable to void, documenting PVR in progress note               If retaining over 350cc, recommend Cline catheter placement   Hgb stable   Repeat Ucx pending-  Recommend abx per primary - she completed a recent course of Cefpodoxime 11/25. Continue prevention of recurrent UTIs; Vaginal Estrogen cream               Behavioral modifications/timed voiding, fluid intake   US reviewed - no hydronephrosis. Will sign off. Available again if needed.      Follow up arranged?  Msg sent to arrange for repeat visit with Dr Chitra Hernandez when next stent exchange is due -- in 4mo (in March)       Wild Razo PA-C   [facility]   M-F 65am-10pm  Pager: [phone] and after hours please page on call Urologist  Office: [phone]    Subjective:     Daily Progress Note: 12/3/2021 8:31 AM    Interval US yesterday. Reviewed results. Urine is yellow, slight blood tinge. No clots. Denies stent pain, other complaints. Objective:     Visit Vitals  /66 (BP 1 Location: Right upper arm, BP Patient Position: Sitting)   Pulse 74   Temp 97.5 °F (36.4 °C)   Resp 20   Ht 5' 2\" (1.575 m)   Wt 95 kg (209 lb 8 oz)   SpO2 96%   BMI 38.32 kg/m²        Temp (24hrs), Av.2 °F (36.2 °C), Min:97 °F (36.1 °C), Max:97.5 °F (36.4 °C)      Intake and Output:   1901 -  0700  In: 1200 [P.O.:1200]  Out: 0   No intake/output data recorded. PHYSICAL EXAMINATION:   Visit Vitals  /66 (BP 1 Location: Right upper arm, BP Patient Position: Sitting)   Pulse 74   Temp 97.5 °F (36.4 °C)   Resp 20   Ht 5' 2\" (1.575 m)   Wt 95 kg (209 lb 8 oz)   SpO2 96%   BMI 38.32 kg/m²     Constitutional: Well developed, well-nourished female in no acute distress. HEENT: Normocephalic, Atraumatic   CV:   no edema   Respiratory: No respiratory distress or difficulties breathing   Abdomen:  Soft and nontender.  Female:  CVA tenderness: none                         Urine is yellow, slight blood tinge. Skin:  Normal color. No evidence of jaundice. Neuro/Psych:  Patient with appropriate affect. Alert and oriented. Wheelchair at time of exam      Lab/Data Review: All lab results for the last 24 hours reviewed. Renal US 21:   IMPRESSION   No hydronephrosis. Atrophic echogenic kidneys, consistent with chronic medical renal disease. Bilateral renal cysts.     Bladder not well visualized, likely collapsed.       Labs:     Labs: Results:   Chemistry    Recent Labs     21  0622   GLU 83   *   K 5.9*   CL 99*   CO2 25   BUN 52*   CREA 6.51*   CA 9.1   AGAP 9   BUCR 8*   ALB 3.0*      CBC w/Diff Recent Labs     21  0641 21  0622   WBC 6.2 5.7   RBC 3.31* 2.82*   HGB 10.0* 8.8*   HCT 33.3* 28.1*    262   GRANS 57 47   LYMPH 28 36   EOS 2 2 Cultures No results for input(s): CULT in the last 72 hours. All Micro Results     Procedure Component Value Units Date/Time    CULTURE, URINE [[id number]] Collected: 12/02/21 0656    Order Status: Completed Specimen: Urine from Clean catch Updated: 12/02/21 2300            Urinalysis Color   Date Value Ref Range Status   12/02/2021 DARK YELLOW   Final     Appearance   Date Value Ref Range Status   12/02/2021 TURBID   Final     Specific gravity   Date Value Ref Range Status   12/02/2021 1.022 1.005 - 1.030   Final     pH (UA)   Date Value Ref Range Status   12/02/2021 6.5 5.0 - 8.0   Final     Protein   Date Value Ref Range Status   12/02/2021 100 (A) NEG mg/dL Final     Ketone   Date Value Ref Range Status   12/02/2021 Negative NEG mg/dL Final     Bilirubin   Date Value Ref Range Status   12/02/2021 SMALL (A) NEG   Final     Comment:     (NOTE)  Positive, unable to confirm with Ictotest.       Blood   Date Value Ref Range Status   12/02/2021 MODERATE (A) NEG   Final     Urobilinogen   Date Value Ref Range Status   12/02/2021 0.2 0.2 - 1.0 EU/dL Final     Nitrites   Date Value Ref Range Status   12/02/2021 Positive (A) NEG   Final     Leukocyte Esterase   Date Value Ref Range Status   12/02/2021 LARGE (A) NEG   Final     Potassium   Date Value Ref Range Status   12/01/2021 5.9 (H) 3.5 - 5.5 mmol/L Final     Creatinine   Date Value Ref Range Status   12/01/2021 6.51 (H) 0.6 - 1.3 MG/DL Final     BUN   Date Value Ref Range Status   12/01/2021 52 (H) 7.0 - 18 MG/DL Final      PSA No results for input(s): PSA in the last 72 hours.    Coagulation Lab Results   Component Value Date/Time    Prothrombin time 17.0 (H) 11/12/2021 03:20 PM    Prothrombin time 13.6 10/08/2021 03:50 PM    INR 1.4 (H) 11/12/2021 03:20 PM    INR 1.1 10/08/2021 03:50 PM    aPTT 35.4 10/14/2020 11:56 AM    aPTT 46.9 (H) 07/16/2020 04:52 PM         Patient Active Problem List   Diagnosis Code    History of acute pyelonephritis Z87.440    History of infection with vancomycin resistant Enterococcus (VRE) Z86.19    Anemia in end-stage renal disease ([facility]) N18.6, D63.1    Chronic hypotension I95.89    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 ([facility]) E11.22, N18.6    Secondary hyperparathyroidism of renal origin ([facility] N25.81    Gastroesophageal reflux disease K21.9    History of recurrent urinary tract infection Z87.440    History of sepsis Z86.19    End-stage renal disease on hemodialysis ([facility]) N18.6, Z99.2    History of hydronephrosis Z87.448    History of septic shock Z86.19    Anticoagulated by anticoagulation treatment Z79.01    Paroxysmal atrial fibrillation ([facility]) I48.0    Closed fracture of left inferior pubic ramus, with routine healing, subsequent encounter S32.592D    Closed nondisplaced fracture of anterior wall of left acetabulum with routine healing S32.415D    Closed fracture of superior pubic ramus, left, with routine healing, subsequent encounter S32.512D    Multiple closed pelvic fractures without disruption of pelvic ring ([facility] S32.82XA    Depression F32. A    History of urinary tract infection Z87.440    Need for prophylactic isolation Z41.8    Hyperlipidemia E78.5    Hypothyroidism E03.9    History of kidney stones Z87.442    Chronic pain G89.29    Lung mass R91.8    History of urethral stricture Z87.448    Uric acid nephrolithiasis N20.0    Urinary incontinence R32    Glaucoma H40.9    Hyperphosphatemia E83.39    Mononeuropathy G58.9    Hyperkalemia E87.5    Gross hematuria R31.0    Impaired mobility and ADLs Z74.09, Z78.9

## 2021-12-03 NOTE — PROGRESS NOTES
Problem: Risk for Spread of Infection  Goal: Prevent transmission of infectious organism to others  Description: Prevent the transmission of infectious organisms to other patients, staff members, and visitors. Outcome: Progressing Towards Goal     Problem: Patient Education:  Go to Education Activity  Goal: Patient/Family Education  Outcome: Progressing Towards Goal     Problem: Falls - Risk of  Goal: *Absence of Falls  Description: Document Bard Weathers Fall Risk and appropriate interventions in the flowsheet. Outcome: Progressing Towards Goal  Note: Fall Risk Interventions:  Mobility Interventions: Assess mobility with egress test, Bed/chair exit alarm, Patient to call before getting OOB, Utilize walker, cane, or other assistive device, Utilize gait belt for transfers/ambulation    Mentation Interventions: Adequate sleep, hydration, pain control, Bed/chair exit alarm, Gait belt with transfers/ambulation, Update white board    Medication Interventions: Assess postural VS orthostatic hypotension, Bed/chair exit alarm, Patient to call before getting OOB, Teach patient to arise slowly, Utilize gait belt for transfers/ambulation    Elimination Interventions: Bed/chair exit alarm, Call light in reach, Patient to call for help with toileting needs, Toilet paper/wipes in reach    History of Falls Interventions: Bed/chair exit alarm, Door open when patient unattended, Investigate reason for fall, Room close to nurse's station, Utilize gait belt for transfer/ambulation         Problem: Patient Education: Go to Patient Education Activity  Goal: Patient/Family Education  Outcome: Progressing Towards Goal     Problem: Pressure Injury - Risk of  Goal: *Prevention of pressure injury  Description: Document Giovany Scale and appropriate interventions in the flowsheet.   Outcome: Progressing Towards Goal  Note: Pressure Injury Interventions:  Sensory Interventions: Assess changes in LOC    Moisture Interventions: Absorbent underpads    Activity Interventions: Chair cushion    Mobility Interventions: Chair cushion, Pressure redistribution bed/mattress (bed type), HOB 30 degrees or less    Nutrition Interventions: Document food/fluid/supplement intake    Friction and Shear Interventions: Apply protective barrier, creams and emollients, Feet elevated on foot rest, HOB 30 degrees or less, Minimize layers                Problem: Patient Education: Go to Patient Education Activity  Goal: Patient/Family Education  Outcome: Progressing Towards Goal     Problem: Pain  Goal: *Control of Pain  Outcome: Progressing Towards Goal     Problem: Patient Education: Go to Patient Education Activity  Goal: Patient/Family Education  Outcome: Progressing Towards Goal     Problem: Inpatient Rehab (Adult)  Goal: *LTG: Avoids injury/falls 100% of time related to deficits  Outcome: Progressing Towards Goal  Goal: *LTG: Avoids infection 100% of time related to deficits  Outcome: Progressing Towards Goal  Goal: *LTG: Absence of DVT during hospitalization  Outcome: Progressing Towards Goal  Goal: *LTG: Maintains Skin Integrity With No Evidence of Pressure Injury 100% of Time  Outcome: Progressing Towards Goal  Goal: Interventions  Outcome: Progressing Towards Goal     Problem: Patient Education: Go to Patient Education Activity  Goal: Patient/Family Education  Outcome: Progressing Towards Goal

## 2021-12-03 NOTE — HOME CARE
Received [facility] home health orders for [address] for ( PT, OT). Spoke with patient's son via phone;  Demographics verified including insurance, phone and address confirmed. Patient has the following DME: PENNIE, VIVIANE ordered. Caregivers available lives with son and daughter in law. Orders noted and arranged to be processed to central intake.      ---   Ambrose Toro LPN  [facility] Liaison

## 2021-12-03 NOTE — ROUTINE PROCESS
SHIFT CHANGE NOTE FOR [facility]    Bedside and Verbal shift change report given to Bess Lee RN (oncoming nurse) by Ki Hung, RN (offgoing nurse). Report included the following information SBAR, Kardex, MAR and Recent Results.     Situation:   Code Status: DNR   Hospital Day: 14   Problem List:   Hospital Problems  Date Reviewed: 12/1/2021          Codes Class Noted POA    Gross hematuria ICD-10-CM: R31.0  ICD-9-CM: 599.71  12/2/2021 No        Hyperkalemia ICD-10-CM: E87.5  ICD-9-CM: 276.7  11/29/2021 No        Mononeuropathy (Chronic) ICD-10-CM: G58.9  ICD-9-CM: 355.9  Unknown Yes    Overview Signed 11/28/2021 11:29 AM by Logan Romero MD     Involving ring finger of left hand             * (Principal) Multiple closed pelvic fractures without disruption of pelvic ring ([address] ICD-10-CM: X69.38JJ  ICD-9-CM: 808.44  11/19/2021 Yes        Impaired mobility and ADLs ICD-10-CM: Z74.09, Z78.9  ICD-9-CM: V49.89  11/19/2021 Yes        Hyperphosphatemia ICD-10-CM: E83.39  ICD-9-CM: 275.3  11/14/2021 Yes        Anticoagulated by anticoagulation treatment ICD-10-CM: Z79.01  ICD-9-CM: V58.61  Unknown Yes    Overview Signed 11/23/2021  9:49 AM by Logan Romero MD     On Apixaban             Paroxysmal atrial fibrillation (HCC) (Chronic) ICD-10-CM: I48.0  ICD-9-CM: 427.31  Unknown Yes        Closed fracture of left inferior pubic ramus, with routine healing, subsequent encounter ICD-10-CM: S32.592D  ICD-9-CM: V54.13  11/12/2021 Yes        Closed nondisplaced fracture of anterior wall of left acetabulum with routine healing ICD-10-CM: S32.415D  ICD-9-CM: V54.19  11/12/2021 Yes        Closed fracture of superior pubic ramus, left, with routine healing, subsequent encounter ICD-10-CM: S32.512D  ICD-9-CM: V54.19  11/12/2021 Yes        History of infection with vancomycin resistant Enterococcus (VRE) ICD-10-CM: N34.71  ICD-9-CM: V12.09  10/8/2021 Yes    Overview Signed 11/23/2021  9:51 AM by Logan Romero MD     Urine culture (collected 10/8/2021, resulted 10/14/2021) yielded growth of >100,000 colonies/ml of Enterococcus faecalis RESISTANT to Ciprofloxacin, Levofloxacin, Tetracycline and Vancomycin             History of urinary tract infection ICD-10-CM: Z87.440  ICD-9-CM: V13.02  10/8/2021 Yes    Overview Signed 11/23/2021  9:52 AM by Daquan Claros MD     Urine culture (collected 10/8/2021, resulted 10/14/2021) yielded growth of >100,000 colonies/ml of Enterococcus faecalis RESISTANT to Ciprofloxacin, Levofloxacin, Tetracycline and Vancomycin             Need for prophylactic isolation ICD-10-CM: Z41.8  ICD-9-CM: V07.0  10/8/2021 Yes    Overview Signed 11/23/2021  9:52 AM by Daquan Claros MD     Urine culture (collected 10/8/2021, resulted 10/14/2021) yielded growth of >100,000 colonies/ml of Enterococcus faecalis RESISTANT to Ciprofloxacin, Levofloxacin, Tetracycline and Vancomycin             End-stage renal disease on hemodialysis (HCC) (Chronic) ICD-10-CM: N18.6, Z99.2  ICD-9-CM: 585.6, V45.11  Unknown Yes    Overview Signed 11/23/2021  9:56 AM by Daquan Claros MD     HD at [facility] on [facility] on [facility].  Tel # [phone]             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 ([facility] (Chronic) ICD-10-CM: E11.22, N18.6  ICD-9-CM: 250.40, 585.6  Unknown Yes    Overview Signed 11/23/2021  9:50 AM by Daquan Claros MD     HbA1c (10/8/2021) = 4.6             Chronic hypotension (Chronic) ICD-10-CM: I95.89  ICD-9-CM: 458.1  Unknown Yes    Overview Signed 11/23/2021 10:01 AM by Daquan Claros MD     On Midodrine                   Background:   Past Medical History:   Past Medical History:   Diagnosis Date    Anemia in end-stage renal disease ([facility]     Anticoagulated by anticoagulation treatment     On Apixaban    Chronic hypotension     On Midodrine    Chronic pain     Closed fracture of left inferior pubic ramus, with routine healing, subsequent encounter 11/12/2021    Closed fracture of superior pubic ramus, left, with routine healing, subsequent encounter 11/12/2021    Closed nondisplaced fracture of anterior wall of left acetabulum with routine healing 11/12/2021    Depression     End-stage renal disease on hemodialysis ([facility]     HD at [facility] on [facility] on MWF.  Tel # [phone]    Gastroesophageal reflux disease     Glaucoma     History of acute pyelonephritis 2/20/2020    History of hydronephrosis 10/5/2021    History of infection with vancomycin resistant Enterococcus (VRE) 10/8/2021    Urine culture (collected 10/8/2021, resulted 10/14/2021) yielded growth of >100,000 colonies/ml of Enterococcus faecalis RESISTANT to Ciprofloxacin, Levofloxacin, Tetracycline and Vancomycin    History of kidney stones     History of recurrent urinary tract infection     History of sepsis 6/18/2021    History of septic shock 10/8/2021    History of urethral stricture     History of urinary tract infection 10/8/2021    Urine culture (collected 10/8/2021, resulted 10/14/2021) yielded growth of >100,000 colonies/ml of Enterococcus faecalis RESISTANT to Ciprofloxacin, Levofloxacin, Tetracycline and Vancomycin    Hyperlipidemia     Hyperphosphatemia 11/14/2021    Hypothyroidism     Lung mass     Mononeuropathy     Involving ring finger of left hand    Need for prophylactic isolation 10/8/2021    Urine culture (collected 10/8/2021, resulted 10/14/2021) yielded growth of >100,000 colonies/ml of Enterococcus faecalis RESISTANT to Ciprofloxacin, Levofloxacin, Tetracycline and Vancomycin    Paroxysmal atrial fibrillation (HCC)     Secondary hyperparathyroidism of renal origin ([facility]     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 ([facility]     HbA1c (10/8/2021) = 4.6    Uric acid nephrolithiasis     Urinary incontinence         Assessment:   Changes in Assessment throughout shift: No change to previous assessment    Patient has a central line: no Patient has Cline Cath: no    Last Vitals:     Vitals: 12/02/21 1605 12/02/21 1744 12/02/21 2030 12/02/21 2109   BP: 124/67 (!) 94/53  124/62   Pulse: 66 80  65   Resp: 16   18   Temp: 97.1 °F (36.2 °C)   97 °F (36.1 °C)   TempSrc:       SpO2: 98%  94% 94%   Weight:       Height:            PAIN    Pain Assessment    Pain Intensity 1: 0 (12/03/21 0435) Pain Intensity 1: 2 (12/29/14 1105)    Pain Location 1: Leg Pain Location 1: Abdomen    Pain Intervention(s) 1: Medication (see MAR) Pain Intervention(s) 1: Medication (see MAR)  Patient Stated Pain Goal: 0 Patient Stated Pain Goal: 0  o Intervention effective: yes  o Other actions taken for pain:       Skin Assessment  Skin color Skin Color: Appropriate for ethnicity  Condition/Temperature Skin Condition/Temp: Dry, Warm  Integrity Skin Integrity: Scars (comment) (bilateral knees)  Turgor Turgor: Non-tenting  Weekly Pressure Ulcer Documentation  Pressure  Injury Documentation: No Pressure Injury Noted-Pressure Ulcer Prevention Initiated  Wound Prevention & Protection Methods  Orientation of wound Orientation of Wound Prevention: Posterior  Location of Prevention Location of Wound Prevention: Heel, Sacrum/Coccyx  Dressing Present Dressing Present : No  Dressing Status    Wound Offloading Wound Offloading (Prevention Methods): Bed, pressure redistribution/air, Elevate heels, Pillows     INTAKE/OUPUT  Date 12/02/21 0700 - 12/03/21 0659 12/03/21 0700 - 12/04/21 0659   Shift 3596-4736 5708-3750 24 Hour Total 1060-1524 2856-3584 24 Hour Total   INTAKE   P.O. 600  600        P. O. 600  600      Shift Total(mL/kg) 600(6.3)  600(6.3)      OUTPUT   Urine(mL/kg/hr)           Urine Occurrence(s) 0 x 1 x 1 x      Stool           Stool Occurrence(s) 0 x 1 x 1 x      Shift Total(mL/kg)           600      Weight (kg) 95 95 95 95 95 95       Recommendations:  1. Patient needs and requests: none    2. Pending tests/procedures: none     3.  Functional Level/Equipment: Partial (one person) / Bed (comment)    Fall Precautions:   Fall risk precautions were reinforced with the patient; she was instructed to call for help prior to getting up. The following fall risk precautions were continued: bed/ chair alarms, door signage, yellow bracelet and socks as well as update of the Mayur Dickenson tool in the patient's room. Freddy Score: 4    HEALS Safety Check    A safety check occurred in the patient's room between off going nurse and oncoming nurse listed above. The safety check included the below items  Area Items   H  High Alert Medications - Verify all high alert medication drips (heparin, PCA, etc.)   E  Equipment - Suction is set up for ALL patients (with adelaide)  - Red plugs utilized for all equipment (IV pumps, etc.)  - WOWs wiped down at end of shift.  - Room stocked with oxygen, suction, and other unit-specific supplies   A  Alarms - Bed alarm is set for fall risk patients  - Ensure chair alarm is in place and activated if patient is up in a chair   L  Lines - Check IV for any infiltration  - Cline bag is empty if patient has a Cline   - Tubing and IV bags are labeled   S  Safety   - Room is clean, patient is clean, and equipment is clean. - Hallways are clear from equipment besides carts. - Fall bracelet on for fall risk patients  - Ensure room is clear and free of clutter  - Suction is set up for ALL patients (with adelaide)  - Hallways are clear from equipment besides carts.    - Isolation precautions followed, supplies available outside room, sign posted     rFancisco Barriga RN

## 2021-12-03 NOTE — PROGRESS NOTES
Sw spoke with pt this am who states that her son will pick her up after dialysis today. Sw shared that copay still needed to be paid for DME and pt states that her son will  the equipment prior to her dc. Sw provided instructions on location and copay payments. Pt stated understanding. Sw will remain available if needed.

## 2021-12-03 NOTE — DIALYSIS
ACUTE HEMODIALYSIS FLOW SHEET    HEMODIALYSIS ORDERS: Physician: Dr. Bonnie House: [place]   Duration: 3 hr  BFR: 350   DFR: 600   Dialysate:  Temp 36   K+   2    Ca+  2   Na 138   Bicarb 30   Wt Readings from Last 1 Encounters:   11/30/21 95 kg (209 lb 8 oz)    Patient Chart [x]   Unable to Obtain []  Dry weight/UF Goal: 2000 ml   Heparin []  Bolus    Units    [] Hourly    Units    [x]None       Pre BP:   103/47    Pulse:  72   Respirations: 18    Temperature:  97.8  [] Oral [] Axillary  Tx: NSS   ml/Bolus   [x] N/A   Labs: []  Pre  [x]  Post:   [x] N/A   Additional Orders(medications, blood products, hypotension management): [] Yes   [x] No     [x]  [facility] Consent Verified     GRAFT/FISTULA ACCESS:   []N/A     []Right     [x]Left     [x]UE     []LE   []AVG   [x]AVF     []Buttonhole    [x]Medical Aseptic Prep Utilized   [x]No S/S infection  []Redness  []Drainage []Cultured  [] Swelling  [] Pain  Bruit:   [x] Strong    [] Weak       Thrill :   [x] Strong    [] Weak     Needle Gauge: 15   Length: 1 inch   If access problem,  notified: []Yes     [x]N/A     Please describe access if present and not used: N/A       GENERAL ASSESSMENT:    LUNGS:   SaO2%      [x] Clear  [] Coarse  [] Crackles  [] Wheezing                                                [] Diminished     Location : []RLL   []LLL    []RUL  []LALI   Cough: []Productive  []Dry  [x]N/A   Respirations:  [x]Easy  []Labored   Therapy:  []RA  []NC l/min    Mask: []NRB  [] Venti    O2%                  []Ventilator  []Intubated  [] Trach  [] BiPaP   CARDIAC: [x]Regular      [] Irregular   [] Pericardial Rub  [] JVD          []  Monitored  [] Bedside  [] Remotely monitored     EDEMA: [x] None  []Generalized  [] Pitting [] 1    [] 2    [] 3    [] 4                 [] Facial  [] Pedal  []  UE  [] LE   SKIN:   [x] Warm  [] Hot     [] Cold   [x] Dry     [] Pale   [] Diaphoretic                  [] Flushed  [] Jaundiced  [] Cyanotic  [] Rash  [] Weeping   LOC:    [x] Alert      [x]Oriented:    [x] Person     [x] Place  [x]Time               [] Confused  [] Lethargic  [] Medicated  [] Non-responsive  [] Non-Verbal   GI / ABDOMEN:                     [] Flat    [] Distended    [x] Soft    [] Firm   []  Obese                   [] Diarrhea  [x] Bowel Sounds  [] Nausea  [] Vomiting     / URINE ASSESSMENT:                   [] Voiding   [x] Oliguria  [] Anuria   []  Cline                  [] Incontinent  []  Incontinent Brief []  Fecal Management System     PAIN:  [x] 0 []1  []2   []3   []4   []5   []6   []7   []8   []9   []10            Scale 0-10  Action/Follow Up:    MOBILITY:  [x] Bed    [] Stretcher      All Vitals and Treatment Details on Attached [address]: [facility]          Room # 178/01   [] 1st Time Acute      [] Stat       [x] Routine      [] Urgent     [x] Acute Room  []  Bedside  [] ICU/CCU  [] ER   Isolation Precautions:  [x] Dialysis    Contact       ALLERGIES:     Allergies   Allergen Reactions    Albumin, Human 25 % Itching     Headache - severe migraine like, itchy eyes, runny nose    Ciprofloxacin Hives    Cyclopentolate Unknown (comments)    Iron Sucrose Diarrhea    Statins-Hmg-Coa Reductase Inhibitors Other (comments)     Body ache      Code Status:  DNR     Hepatitis Status     Lab Results   Component Value Date/Time    Hepatitis B surface Ag <0.10 11/17/2021 02:20 PM    Hepatitis B surface Ab 29.33 11/17/2021 02:20 PM        Current Labs:      Lab Results   Component Value Date/Time    WBC 6.2 12/03/2021 06:41 AM    Hemoglobin, POC 8.8 (L) 09/24/2020 08:45 AM    HGB 10.0 (L) 12/03/2021 06:41 AM    Hematocrit, POC 26 (L) 09/24/2020 08:45 AM    HCT 33.3 (L) 12/03/2021 06:41 AM    PLATELET 497 07/35/1646 06:41 AM    .6 (H) 12/03/2021 06:41 AM     Lab Results   Component Value Date/Time    Sodium 136 12/03/2021 06:41 AM    Potassium 5.0 12/03/2021 06:41 AM    Chloride 100 12/03/2021 06:41 AM    CO2 26 12/03/2021 06:41 AM Anion gap 10 12/03/2021 06:41 AM    Glucose 102 (H) 12/03/2021 06:41 AM    BUN 43 (H) 12/03/2021 06:41 AM    Creatinine 6.73 (H) 12/03/2021 06:41 AM    BUN/Creatinine ratio 6 (L) 12/03/2021 06:41 AM    GFR est AA 7 (L) 12/03/2021 06:41 AM    GFR est non-AA 6 (L) 12/03/2021 06:41 AM    Calcium 9.6 12/03/2021 06:41 AM          DIET:  DIET ADULT ORAL NUTRITION SUPPLEMENT  DIET ADULT      PRIMARY NURSE REPORT:   Pre Dialysis: MEGHANN Salamanca RN     Time: 26      EDUCATION:    [x] Patient [] Other           Knowledge Basis: []None [x]Minimal [] Substantial []Not able to assess at this time  Barriers to learning  [x]N/A  []Intubated/Ventilated  []Sedated   [] Access Care     [] S&S of infection  [] Fluid Management  [] K+   [x] Procedural    []Albumin   [] Medications   [] Tx Options   [] Transplant   [] Diet   [] Other   Teaching Tools:  [x] Explain  [] Demo  [] Handouts [] Video  Patient response: [x] Verbalized understanding  [] Teach back  [] Return demonstration   [] Requires follow up      [x]Time Out/Safety Check  [x] Extracorporeal Circuit Tested for integrity       RO/HEMODIALYSIS MACHINE SAFETY CHECKS - Before each treatment:     Machine Number:                   [facility]                                     [x] Unit Machine # 3 with centralized RO                                                                                  Alarm Test:  Pass time 2605            [x] RO/Machine Log Complete    Machine Temp    36.0             Dialysate: pH  7.4    Conductivity: Meter 13.8     HD Machine  13.9      TCD: 13.7  Dialyzer Lot # [id number]     Blood Tubing Lot # [id number]   Saline Lot # [id number]     CHLORINE TESTING-Before each treatment and every 4 hours    Total Chlorine: [x] less than 0.1 ppm  Initial Time Check: 0900       4 Hr/2nd Check Time: 1300   (if greater than 0.1 ppm from Primary then every 30 minutes from Secondary)     TREATMENT INITIATION - with Dialysis Precautions:   [x] All Connections Secured              [x] Saline Line Double Clamped   [x] Venous Parameters Set               [x] Arterial Parameters Set    [x] Prime Given 250ml NSS              [x]Air Foam Detector Engaged      Treatment Initiation Note:  3967; Pt arrived to HD without any complaints. Pt A &O X 3, follows commands, no distress noted on RA. LUE AVF assessed no abnormalities noted, bruit and thrill strong. AVF accessed using  15G needles without any difficulty. Good flows achieved from both needles    1000; Time out performed per policy, HD commenced, pt tolerated well. During Treatment Notes:  1030;HD in good progress;VSS. Vascular access visible with arterial and venous line connections intact. 1100;HD in good progress,VSS. Vascular access visible with arterial and venous line connections intact. 1130;HD in good progress,VSS. Vascular access visible with arterial and venous line connections intact. 1200;HD in good progress,VSS. Vascular access visible with arterial and venous line connections intact. 1230;HD in good progress,VSS. Vascular access visible with arterial and venous line connections intact. 1300; Dialysis treatment completed. Medication Dose Volume Route Time [facility] Nurse, Title   None     MACK Laboy RN     Post Assessment  Dialyzer Cleared:[] Good [x] Fair  [] Poor  Blood processed:  61.0 L  UF Removed:  2000 Ml  Post /51  Pulse  79 Resp  18   Temp 97.7  [x] Oral [] Axillary Lungs: [] Clear                [x] No change from initial assessment   Post Tx Vascular Access: [] N/A  AVF/AVG: Bleeding stopped with  Arterial Pressure for 8 min   Venous Pressure for 8 min             Cardiac:  [x] Regular   [] Irregular   Rhythm:  [] Monitored   [x] Not Monitored   Edema;     [x]    None;   []   Generalised   Skin:[x] Warm  [x] Dry [] Diaphoretic               [] Flushed  [] Pale [] Cyanotic   Pain:  [x]0  []1 []2  []3 []4  []5  []6 []7 []8  []9  []10       Post Treatment Note:  1320;VSS.   Arterial and venous needles removed and pressure applied until hemostasis achieved. Dry dressings applied. Bruit/Thrill present above and below dressings. Dressing dry , clean and intact        POST TREATMENT PRIMARY NURSE HANDOFF REPORT:   Post Dialysis: MEGHANN Salamanca RN                Time:  1330       Abbreviations: AVG-arterial venous graft, AVF-arterial venous fistula, IJ-Internal Jugular, Subcl-Subclavian, Fem-Femoral, Tx-treatment, AP/HR-apical heart rate, DFR-dialysate flow rate, BFR-blood flow rate, AP-arterial pressure, -venous pressure, UF-ultrafiltrate, TMP-transmembrane pressure, Jasper-Venous, Art-Arterial, RO-Reverse Osmosis

## 2021-12-03 NOTE — FACE TO FACE
[address]  [address] [place] [address], [address]    [address] [address]    Name: Mojgan Snowden Age / Sex: 64 y.o. / female   CSN: [id number] MRN: [id number]   [facility] Date: 11/19/2021 Discharge Date: 12/3/2021     Primary Care Provider: Bill Rai MD      Primary Rehabilitation Diagnosis  1. Impaired Mobility and ADLs  2. 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 ([facility]) E11.22, N18.6    Secondary hyperparathyroidism of renal origin ([facility] N25.81    Gastroesophageal reflux disease K21.9    History of recurrent urinary tract infection Z87.440    History of sepsis Z86.19    End-stage renal disease on hemodialysis ([facility]) N18.6, Z99.2    History of hydronephrosis Z87.448    History of septic shock Z86.19    Anticoagulated by anticoagulation treatment Z79.01    Paroxysmal atrial fibrillation ([facility]) I48.0    Closed fracture of left inferior pubic ramus, with routine healing, subsequent encounter S32.592D    Closed nondisplaced fracture of anterior wall of left acetabulum with routine healing S32.415D    Closed fracture of superior pubic ramus, left, with routine healing, subsequent encounter S32.512D    Multiple closed pelvic fractures without disruption of pelvic ring ([address] S32.82XA    Depression F32. A    History of urinary tract infection Z87.440    Need for prophylactic isolation Z41.8    Hyperlipidemia E78.5    Hypothyroidism E03.9    History of kidney stones Z87.442    Chronic pain G89.29    Lung mass R91.8    History of urethral stricture Z87.448    Uric acid nephrolithiasis N20.0    Urinary incontinence R32    Glaucoma H40.9    Hyperphosphatemia E83.39    Mononeuropathy G58.9    Hyperkalemia E87.5    Gross hematuria R31.0    Impaired mobility and ADLs Z74.09, Z78.9       History of the Present Illness (from the History and Physical dictated by Dr. Ralph Miller): Sharlene Bailon is a 64 y.o.  female with multiple medical problems who presented to [facility] on 11/12/21 for left sided hip and leg pain following a fall a week prior. She had xrays after initial fall were negative for fracture but pain progressively worsened and was unable to bear weight. HR was 170s on arrival.  Her CT Lt hip showed comminuted fracture involving the left anterior acetabular wall with involvement of the base of the left superior pubic ramus whic are not signficiantly displaced. Also nondisplaced left inferior pubic ramus fracture. L knee xray no acute fracture. Cardiology was consulted due to tachycardia and afib as well as hypotension. Pt was admitted to family medicine under Dr. Jean Noyola. Ortho consulted, determined that no surgical intervention is indicated recommended PT and pain control. Nephrology Dr Farrah Smith was consulted for HD and management of ESRD. She was found to have UTI.          The patient had remained hemodynamically stable but due to the above events, the patient was noted to have impaired mobility and ADLs. Patient was felt to be a good candidate for acute inpatient rehabilitation. Upon evaluation by Physical Therapy and Occupational Therapy, the patient was recommended for acute inpatient rehabilitation.  The patient was discharged and was subsequently admitted to the [facility] for intensive rehabilitation to help recover strength, function and mobility.        Patient accompanied by daughter-in-law today. Complains of severe migraine like headache when receiving albumin with dialysis as well as itchy watery eyes, runny nose and rash when receives this. Has spoken to Dr. Beck Del Angel about this and reports he told her they can try something else. Unsure if this is true allergy for her however she and her daughter in law desire this to be considered allergy so she does not get this again. Will update allergy list and include patient reported sytmpoms. Will add antihistamine to help with runny nose and itchy eyes. Past Medical History:  Past Medical History:   Diagnosis Date    Anemia in end-stage renal disease ([facility] [address]     Anticoagulated by anticoagulation treatment     On Apixaban    Chronic hypotension     On Midodrine    Chronic pain     Closed fracture of left inferior pubic ramus, with routine healing, subsequent encounter 11/12/2021    Closed fracture of superior pubic ramus, left, with routine healing, subsequent encounter 11/12/2021    Closed nondisplaced fracture of anterior wall of left acetabulum with routine healing 11/12/2021    Depression     End-stage renal disease on hemodialysis ([facility] [address] [facility]     HD at [address] [place] [address] [facility]  Tel # [phone]    Gastroesophageal reflux disease     Glaucoma     History of acute pyelonephritis 2/20/2020    History of hydronephrosis 10/5/2021    History of infection with vancomycin resistant Enterococcus (VRE) 10/8/2021    Urine culture (collected 10/8/2021, resulted 10/14/2021) yielded growth of >100,000 colonies/ml of Enterococcus faecalis RESISTANT to Ciprofloxacin, Levofloxacin, Tetracycline and Vancomycin    History of kidney stones     History of recurrent urinary tract infection     History of sepsis 6/18/2021    History of septic shock 10/8/2021    History of urethral stricture     History of urinary tract infection 10/8/2021    Urine culture (collected 10/8/2021, resulted 10/14/2021) yielded growth of >100,000 colonies/ml of Enterococcus faecalis RESISTANT to Ciprofloxacin, Levofloxacin, Tetracycline and Vancomycin    Hyperlipidemia     Hyperphosphatemia 11/14/2021    Hypothyroidism     Lung mass     Mononeuropathy     Involving ring finger of left hand    Need for prophylactic isolation 10/8/2021    Urine culture (collected 10/8/2021, resulted 10/14/2021) yielded growth of >100,000 colonies/ml of Enterococcus faecalis RESISTANT to Ciprofloxacin, Levofloxacin, Tetracycline and Vancomycin    Paroxysmal atrial fibrillation (HCC)     Secondary hyperparathyroidism of renal origin ([facility]     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 ([facility]     HbA1c (10/8/2021) = 4.6    Uric acid nephrolithiasis     Urinary incontinence        Past Surgical History:  Past Surgical History:   Procedure Laterality Date    HX APPENDECTOMY      HX CHOLECYSTECTOMY      HX GASTRIC BYPASS      Gastric stapling    HX KNEE ARTHROSCOPY      HX UROLOGICAL      right PCN placement    HX UROLOGICAL  07/23/2018    RIGHT URETEROSCOPY WITH HOLMIUM LASER    IR EXCHANGE NEPHRO PERC LT SI  2/21/2020    IR EXCHANGE NEPHRO PERC RT SI  4/13/2020    IR EXCHANGE NEPHRO PERC RT SI  7/17/2020    IR NEPHROSTOMY PERC RT PLC CATH  SI  10/14/2020    IR NEPHROURETERAL PERC RT PLC CATH NEW ACCESS  SI  4/30/2020    [facility] INTRO CATH DIALYSIS CIRCUIT DX ANGRPH FLUOR S&I Left 9/24/2020    FISTULOGRAM LEFT/poss permanent catheter placement performed by Eilleen Rinne, MD at [facility] CATH LAB    VASCULAR SURGERY PROCEDURE UNLIST      lef AVF       Medications on Discharge:    Current Discharge Medication List      START taking these medications    Details   amiodarone (CORDARONE) 200 mg tablet Take 1 Tablet by mouth daily.  Indications: prevention of recurrent atrial fibrillation  Qty: 30 Tablet, Refills: 0  Start date: 12/3/2021    Associated Diagnoses: Paroxysmal atrial fibrillation ([facility] sevelamer carbonate (RENVELA) 800 mg tab tab Take 2 Tablets by mouth three (3) times daily (with meals). Indications: renal osteodystrophy with hyperphosphatemia  Qty: 180 Tablet, Refills: 0  Start date: 12/3/2021    Associated Diagnoses: Secondary hyperparathyroidism of renal origin ([facility] End-stage renal disease on hemodialysis ([facility] Hyperphosphatemia      sodium zirconium cyclosilicate (LOKELMA) 5 gram powder packet Take 1 Packet by mouth daily. Indications: high levels of potassium in the blood  Qty: 30 Packet, Refills: 0  Start date: 12/3/2021    Associated Diagnoses: Hyperkalemia         CONTINUE these medications which have CHANGED    Details   allopurinoL (ZYLOPRIM) 100 mg tablet Take 1 Tablet by mouth every Monday, Wednesday, Friday. [after hemodialysis]  Indications: treatment to prevent acute gout attack  Qty: 12 Tablet, Refills: 0  Start date: 12/3/2021    Associated Diagnoses: Uric acid nephrolithiasis      HYDROmorphone (DILAUDID) 2 mg tablet Take 0.5 Tablets by mouth every eight (8) hours as needed (for pain level 5/10 or greater) for up to 5 days. Max Daily Amount: 3 mg. Indications: pain  Qty: 8 Tablet, Refills: 0  Start date: 12/2/2021, End date: 12/7/2021    Associated Diagnoses: Multiple closed fractures of pelvis without disruption of pelvic ring with routine healing, subsequent encounter         CONTINUE these medications which have NOT CHANGED    Details   midodrine (PROAMATINE) 10 mg tablet Take 1 Tablet by mouth three (3) times daily (with meals) for 30 days. Qty: 90 Tablet, Refills: 0  Start date: 11/19/2021, End date: 12/19/2021      acetaminophen (Tylenol Extra Strength) 500 mg tablet Take 1 Tablet by mouth every six (6) hours as needed for Pain. Qty: 30 Tablet, Refills: 0      lidocaine (LIDODERM) 5 % Apply patch to the affected area for 12 hours a day and remove for 12 hours a day.   Qty: 1 Each, Refills: 0      gabapentin (NEURONTIN) 100 mg capsule Take 2 Capsules by mouth Three (3) times a week. Max Daily Amount: 200 mg. Take 2 capsules by mouth 3 times a week as needed for pain post dialysis. Indications: concern for back pain post dialysis  Qty: 15 Capsule, Refills: 0    Associated Diagnoses: Type 2 diabetes mellitus with diabetic autonomic neuropathy, without long-term current use of insulin ([facility]); ESRD (end stage renal disease) ([facility])      apixaban (ELIQUIS) 5 mg tablet Take 1 Tablet by mouth two (2) times a day. Qty: 60 Tablet, Refills: 0      meclizine (ANTIVERT) 25 mg tablet Take 25 mg by mouth three (3) times daily as needed for Dizziness. methoxy peg-epoetin beta (MIRCERA INJECTION) 30 mcg. DULoxetine (Cymbalta) 20 mg capsule Take 20 mg by mouth daily. estradioL (Estrace) 0.01 % (0.1 mg/gram) vaginal cream Apply a fingertip amount around the urethra three times a week. Qty: 30 g, Refills: 3      biotin 1,000 mcg chew Take 1 Tab by mouth daily. cyanocobalamin 1,000 mcg tablet Take 1,000 mcg by mouth daily. lactobacillus sp. 50 billion cpu (BIO-K PLUS) 50 billion cell -375 mg cap capsule Take 1 Cap by mouth daily. Qty: 30 Cap, Refills: 2      ascorbic acid, vitamin C, (VITAMIN C) 500 mg tablet Take 500 mg by mouth daily. calcitRIOL (ROCALTROL) 0.25 mcg capsule Take 0.25 mcg by mouth daily. cholecalciferol (VITAMIN D3) (2,000 UNITS /50 MCG) cap capsule Take 2,000 Units by mouth two (2) times a day. Take two tabs a total of 4000 units      latanoprost (XALATAN) 0.005 % ophthalmic solution Administer 1 Drop to both eyes nightly. One drop at bedtime      levothyroxine (SYNTHROID) 125 mcg tablet Take 125 mcg by mouth Daily (before breakfast). omeprazole (PRILOSEC) 20 mg capsule Take 20 mg by mouth daily. ondansetron (ZOFRAN ODT) 4 mg disintegrating tablet Take 4 mg by mouth every eight (8) hours as needed for Nausea or Vomiting. vit B Cmplx 3-FA-Vit C-Biotin (NEPHRO HOWIE RX) 1- mg-mg-mcg tablet Take 1 Tab by mouth daily. STOP taking these medications       fludrocortisone (FLORINEF) 0.1 mg tablet Comments:   Reason for Stopping:         fludrocortisone (FLORINEF) 0.1 mg tablet Comments:   Reason for Stopping:         lidocaine 4 % patch Comments:   Reason for Stopping:         b complex vitamins (Vitamins B Complex) tablet Comments:   Reason for Stopping:               Condition on Discharge: Stable. Ambulation Gait  Amount of Assistance: 6 (Modified independent)  Distance (ft): 306 Feet (ft)  Assistive Device: Gait belt, Walker, rolling     Wheelchair Mobility Wheelchair Mobility/Management  Able to Propel (ft): 232 feet  Functional Level: 6  Curbs/Ramps Assist Required (FIM Score): 0 (Not tested)  Wheelchair Setup Assist Required : 5 (Supervision/setup)  Wheelchair Management: Manages left brake, Manages right brake         Disposition: Patient clinically improved and was discharged to home with home health physical therapy and occupational therapy. The patient is temporarily homebound secondary to functional deficits due to Multiple closed pelvic fractures (comminuted fracture involving the left anterior acetabular wall with involvement of the base of the left superior pubic ramus which are not significantly displaced; nondisplaced left inferior pubic ramus fracture). The patient can ambulate using a rolling walker (see above). The patient would benefit from continued skilled physical therapy in order to improve independent functional mobility within the home with use of least restrictive device. The patient would also benefit from continued skilled occupational therapy in order to improve self care and functional mobility within the home with use of least restrictive device.      I certify that this patient is homebound, that is: 1) patient requires the use of a walker device, special transportation, or assistance of another to leave the home; or 2) patient's condition makes leaving the home medically contraindicated; and 3) patient has a normal inability to leave the home and leaving the home requires considerable and taxing effort. Patient may leave the home for infrequent and short duration for medical reasons, and occasional absences for non-medical reasons. Homebound status is due to the following functional limitations: Patient with strength deficits limiting the performance of all ADL's without caregiver assistance or the use of an assistive device. Due to the abovementioned data, I certify that the patient needs intermittent Physical Therapy and Occupational Therapy. I will NOT be following this patient in the Community and Dr. Nancy Canas will be responsible for signing the [facility] of Care. In compliance with the Affordable Care Act, I certify that this patient was managed by me during this hospitalization and that I had a Face-to-Face Encounter that meets the physician Face-to-Face Encounter requirements.       Signed:    Karma Palumbo MD    December 2, 2021

## 2021-12-03 NOTE — DISCHARGE SUMMARY
[facility] PHYSICAL REHABILITATION  [address] [place] [address], [address]     INPATIENT REHABILITATION  DISCHARGE SUMMARY    Name: Adarsh Amador MRN: [id number]   Age / Sex: 64 y.o. / female CSN: [id number]   YOB: 1945 Length of Stay: 14 days   Admit Date: 11/19/2021 Discharge Date: 12/3/2021       PRIMARY CARE PHYSICIAN: Rafaela Curiel MD      DISCHARGE DIAGNOSES:    Primary Rehabilitation Diagnosis  1. Impaired Mobility and ADLs  2. 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 [address] N25.81    Gastroesophageal reflux disease K21.9    History of recurrent urinary tract infection Z87.440    History of sepsis Z86.19    End-stage renal disease on hemodialysis ([facility]) N18.6, Z99.2    History of hydronephrosis Z87.448    History of septic shock Z86.19    Anticoagulated by anticoagulation treatment Z79.01    Paroxysmal atrial fibrillation ([facility]) I48.0    Closed fracture of left inferior pubic ramus, with routine healing, subsequent encounter S32.592D    Closed nondisplaced fracture of anterior wall of left acetabulum with routine healing S32.415D    Closed fracture of superior pubic ramus, left, with routine healing, subsequent encounter S32.512D    Multiple closed pelvic fractures without disruption of pelvic ring ([address] S32.82XA    Depression F32. A    History of urinary tract infection Z87.440    Need for prophylactic isolation Z41.8    Hyperlipidemia E78.5    Hypothyroidism E03.9    History of kidney stones Z87.442    Chronic pain G89.29    Lung mass R91.8    History of urethral stricture Z87.448    Uric acid nephrolithiasis N20.0    Urinary incontinence R32    Glaucoma H40.9    Hyperphosphatemia E83.39    Mononeuropathy G58.9    Hyperkalemia E87.5    Gross hematuria R31.0    Impaired mobility and ADLs Z74.09, Z78.9       CONSULTS CALLED:   1. Nephrology (Dr. Valeda Mohs)   2. Urology (Camelia Espinosa, [address])       DIAGNOSTIC STUDIES DONE: Retroperitoneal ultrasound (12/2/2021) showed:  > Atrophic echogenic kidneys, consistent with chronic medical renal disease.  > Bilateral renal cysts. > Bladder not well visualized, likely collapsed. PROCEDURES DONE: None      BRIEF HISTORY (from the History and Physical dictated by Dr. Eric Guevara): Meme España is a 64 y.o.  female with multiple medical problems who presented to [facility] ER on 11/12/21 for left sided hip and leg pain following a fall a week prior. She had xrays after initial fall were negative for fracture but pain progressively worsened and was unable to bear weight. HR was 170s on arrival.  Her CT Lt hip showed comminuted fracture involving the left anterior acetabular wall with involvement of the base of the left superior pubic ramus whic are not signficiantly displaced. Also nondisplaced left inferior pubic ramus fracture. L knee xray no acute fracture. Cardiology was consulted due to tachycardia and afib as well as hypotension. Pt was admitted to family medicine under Dr. Para Lanes. Ortho consulted, determined that no surgical intervention is indicated recommended PT and pain control. Nephrology Dr Rajendra Adams was consulted for HD and management of ESRD.    She was found to have UTI.          The patient had remained hemodynamically stable but due to the above events, the patient was noted to have impaired mobility and ADLs. Patient was felt to be a good candidate for acute inpatient rehabilitation. Upon evaluation by Physical Therapy and Occupational Therapy, the patient was recommended for acute inpatient rehabilitation. The patient was discharged and was subsequently admitted to the [facility] for intensive rehabilitation to help recover strength, function and mobility.        Patient accompanied by daughter-in-law today. Complains of severe migraine like headache when receiving albumin with dialysis as well as itchy watery eyes, runny nose and rash when receives this. Has spoken to Dr. Salvador Nagel about this and reports he told her they can try something else. Unsure if this is true allergy for her however she and her daughter in law desire this to be considered allergy so she does not get this again. Will update allergy list and include patient reported sytmpoms. Will add antihistamine to help with runny nose and itchy eyes. COURSE IN THE HOSPITAL: Upon admission to the [facility], the patient underwent physical therapy, occupational therapy and speech therapy. The patient was able to actively participate in the rehabilitation activities and progressed well. On discharge, the patient was able to perform the following activities:    1.  Occupational Therapy    ON ADMISSION ON DISCHARGE   Eating  Functional Level: 5   Eating  Functional Level: 7     Grooming  Functional Level: 5   Grooming  Functional Level: 5     Bathing  Functional Level: 3   Bathing  Functional Level: 3     Upper Body Dressing  Functional Level: 4   Upper Body Dressing  Functional Level: 4     Lower Body Dressing  Functional Level: 3   Lower Body Dressing  Functional Level: 3     Toileting  Functional Level: 3   Toileting  Functional Level: 6     Toilet Transfers  Toilet Transfer Score: 4   Toilet Transfers  Toilet Transfer Score: 4     Tub Hollie Brunson Transfers  Tub/Shower Transfer Score: 4   Tub/Shower Transfers  Tub/Shower Transfer Score: 4       2.  Physical Therapy    ON ADMISSION ON DISCHARGE   Wheelchair Mobility/Management  Able to Propel (ft): 230 feet  Functional Level: 4  Curbs/Ramps Assist Required (FIM Score): 0 (Not tested)  Wheelchair Setup Assist Required : 2 (Maximal assistance)  Wheelchair Management: Manages left brake, Manages right brake (assistance with armrests, footrests) Wheelchair Mobility/Management  Able to Propel (ft): 232 feet  Functional Level: 6  Curbs/Ramps Assist Required (FIM Score): 0 (Not tested)  Wheelchair Setup Assist Required : 5 (Supervision/setup)  Wheelchair Management: Manages left brake, Manages right brake     Gait  Amount of Assistance: 0 (Not tested)  Distance (ft): 0 Feet (ft)  Assistive Device:  (NT) Gait  Amount of Assistance: 6 (Modified independent)  Distance (ft): 306 Feet (ft)  Assistive Device: Gait belt, Walker, rolling     Balance-Sitting/Standing  Sitting - Static: Good (unsupported)  Sitting - Dynamic: Good (unsupported)  Standing - Static: Fair, Occasional, Poor (using bilat UE support of RW, occasionally one UE support)  Standing - Dynamic : Impaired Balance-Sitting/Standing  Sitting - Static: Good (unsupported)  Sitting - Dynamic: Good (unsupported)  Standing - Static: Fair  Standing - Dynamic : Impaired     Bed/Mat Mobility  Rolling Right : 4 (Minimal assistance)  Rolling Left : 4 (Minimal assistance)  Supine to Sit : 3 (Moderate assistance) (flat head of bed, no railings, needing minimal trunk support)  Sit to Supine : 3 (Moderate assistance) (head of bed flat, no rails, assist w/ left LE and reposition) Bed/Mat Mobility  Rolling Right : 6 (Modified independent)  Rolling Left : 6 (Modified independent)  Supine to Sit : 6 (Modified independent)  Sit to Supine : 6 (Modified independent)     Transfers  Transfer Type: SPT with walker (mod/max A due to loss of balance and needing recovery)  Other: lateral transfer with transfer board (needing mod/max A)  Transfer Assistance :  (mod/max A for stabilization and for maintaining TTWB left LE)  Sit to Stand Assistance: Moderate assistance (lifting assistance, support left LE to ensure TTWB)  Car Transfers: Not tested  Car Type: NT Transfers  Transfer Type: Other  Other: stand step without AD  Transfer Assistance : 6 (Modified independent)  Sit to Stand Assistance: Modified independent  Car Transfers:  (Modified Independent with stand step with RW)  Car Type: Car Transfer Trainer     Steps or Stairs  Steps/Stairs Ambulated (#): 0  Level of Assist : 0 (Not tested)  Rail Use:  (NT) Steps or Stairs  Steps/Stairs Ambulated (#): 4 (6\")  Level of Assist : 5 (Supervision/setup)  Rail Use: Both (for 2 steps and left for 2 steps for two trials)       3.  Speech and Language Pathology    ON ADMISSION ON DISCHARGE   Comprehension (Native Language)  Primary Mode of Comprehension: Auditory  Score: 5 Comprehension (Native Language)  Primary Mode of Comprehension: Auditory  Score: 6     Expression (Native Language)  Primary Mode of Expression: Verbal  Score: 5  Comments: expresses her needs appropriately   Expression (Native Language)  Primary Mode of Expression: Verbal  Score: 5  Comments: expresses her needs appropriately     Social Interaction/Pragmatics  Score: 4  Comments: polite & nice Social Interaction/Pragmatics  Score: 4  Comments: polite & nice     Problem Solving  Score: 4  Comments: she knew to don her shoes so not to slide on the floor with her socks on before we used RW to go from her bed to chair   Problem Solving  Score: 4  Comments: she knew to don her shoes so not to slide on the floor with her socks on before we used RW to go from her bed to chair     Memory  Score: 4  Comments: appears WNL with sharing her medical hx & family dynamics Memory  Score: 4  Comments: appears WNL with sharing her medical hx & family dynamics       Legend:   7 - Independent   6 - Modified Independent   5 - Standby Assistance / Supervision / Set-up   4 - Minimum Assistance / Contact Guard Assistance   3 - Moderate Assistance   2 - Maximum Assistance   1 - Total Assistance / Dependent       ACUTE MEDICAL ISSUES ADDRESSED IN INPATIENT REHABILITATION FACILITY:     > Multiple closed pelvic fractures (comminuted fracture involving the left anterior acetabular wall with involvement of the base of the left superior pubic ramus which are not significantly displaced; nondisplaced left inferior pubic ramus fracture)   > Orthopedic surgery recommending non-surgical management with PT, pain control   > Partial (50%) weightbearing on the left lower extremity     > Urinary tract infection, History of right ureteral stent   > Urine culture (collected 11/16/2021, resulted 11/20/2021) yielded growth of >100,000 colonies/ml of Proteus mirabilis RESISTANT to Nitrofurantoin   > Started on IV ceftriaxone and transitioned to cefpodoxime   > On discharge, patient is to follow up with Urology (Dr. Dean Reed)   > On 11/25/2021, patient had completed the recommended treatment course of Cefpodoxime 200 mg PO q 24 hr     > History of VRE urinary tract infection, 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)   > Urine culture (collected 10/8/2021, resulted 10/14/2021) yielded growth of >100,000 colonies/ml of Enterococcus faecalis RESISTANT to Ciprofloxacin, Levofloxacin, Tetracycline and Vancomycin   > Contact isolation    > Paroxysmal atrial fibrillation, anticoagulated on Apixaban   > Anticoagulation    > Continue Apixaban 5 mg PO BID    > Rate-control    > None   > Maintenance of sinus rhythm    > Continue Amiodarone 200 mg PO once daily    > Glaucoma    > Continue Latanoprost 0.005% ophthalmic solution, 1 drop into each ete q PM    > Hypothyroidism   > TSH (11/13/2021) = 11.4   > TSH (11/22/2021) = 1.69   > Free T4 (11/22/2021) = 1.1   > Continue Levothyroxine 125 mcg PO once daily     > Chronic hypotension   > On 11/23/2021, decreased Midodrine from 10 mg to 5 mg PO TID with meals   > Continue Midodrine 5 mg PO TID with meals     > End-stage renal disease, on hemodialysis (Mon-Wed-Fri); Secondary hyperparathyroidism of renal origin   > Nephrology consult (Dr. 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 Allergy to albumin   > Will list as allergy per pt and daughter-in-law request.  Start antihistamine.   Monitor.    > Hyperkalemia      11/24/21  0539 11/22/21  0527 11/19/21  0124 11/18/21  0143 11/17/21  0145 11/16/21  0232 11/15/21  0216 11/14/21  0424 11/13/21  0445 11/12/21  1520   K 5.7* 5.4 4.8 4.3 4.7 4.6 4.8 4.4 4.7 3.5      > On 11/24/2021, patient was given Sodium zirconium cyclosilicate 10 grams PO x 1 dose      11/29/21  1008 11/26/21  0635   K 6.2* 5.1      > On 11/29/2021, patient was given Sodium zirconium cyclosilicate 10 grams PO x 1 dose    > K (12/1/2021) = 5.9   > On 12/1/2021, started Sodium zirconium cyclosilicate 5 grams PO once daily   > K (12/3/2021) = 5.0   > Continue Sodium zirconium cyclosilicate 5 grams PO once daily    > Hyperphosphatemia      11/26/21  0635 11/24/21  0539 11/22/21  0527 11/14/21  0424   PHOS 7.2* 8.1* 8.1* 6.0*      > On 11/26/2021, started Calcium acetate 667 mg PO TID with meals   > P (11/29/2021) = 8.4   > On 11/29/2021, increased Calcium acetate from 667 mg to 1,334 mg PO TID with meals --> changed to Sevelamer 1600 mg PO TID with meals   > P (12/1/2021) = 7.2   > P (12/3/2021) = 6.9   > Continue Sevelamer 1600 mg PO TID with meals    > 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   > On 11/28/2021, started Lidocaine-Prilocaine 2.5-2.5% cream, applied to palmar surface of ring finger of left hand BID              > During the patient's stay at the ARU, the patient was given Lidocaine-Prilocaine 2.5-2.5% cream, applied to palmar surface of ring finger of left hand BID   > Continue:    > Duloxetine 20 mg PO once daily    > Gabapentin 200 mg PO q Mon-Wed-Fri    > Gross hematuria; History of recurrent urinary tract infection; History of hydronephrosis; History of kidney stones;  History of right ureteral stent      12/01/21  0622 11/29/21  0539 11/26/21  0635 11/24/21  0539 11/22/21  0527 11/19/21  0124   WBC 5.7 8.1 8.0 6.3 10.1 8.7      > No documented fever since admission   > On 12/1/2021, patient had reported bloody urine   > Urinalysis (12/2/2021) showed turbid urine, blood moderate, bilirubin small, nitrites positive, leukocyte esterase large, WBC unable to quantify, RBC TNTC, bacteria 4+   > Urine culture (collected 12/2/2021, results as of 12/3/2021): PENDING    > Retroperitoneal ultrasound (12/2/2021) showed:    > Atrophic echogenic kidneys, consistent with chronic medical renal disease.    > Bilateral renal cysts.     > Bladder not well visualized, likely collapsed.   > Doubt infection; no antibiotics for now   > Urology consult ([facility]) called for evaluation and comanagement   > WBC count (12/3/2021) = 6.2   > On 12/3/2021, hematuria had resolved    > Analgesia / Chronic low back pain              > During the patient's stay at the ARU, the patient was given:     > Acetaminophen 650 mg PO TID (8AM, 12PM, 4PM)    > Acetaminophen 650 mg PO q 4 hr PRN for pain level 4/10 or lesser (from 8PM to 4AM only)    > Lidocaine 4% patch, 1 patch on affected area q 24 hr (12 hr on, 12 hr off)    > Hydromorphone 1 mg PO q 8 hr PRN for pain level 5/10 or greater       MEDICATIONS ON DISCHARGE:    Current Discharge Medication List      START taking these medications    Details   amiodarone (CORDARONE) 200 mg tablet Take 1 Tablet by mouth daily. Indications: prevention of recurrent atrial fibrillation  Qty: 30 Tablet, Refills: 0  Start date: 12/3/2021    Associated Diagnoses: Paroxysmal atrial fibrillation (HCC)      sevelamer carbonate (RENVELA) 800 mg tab tab Take 2 Tablets by mouth three (3) times daily (with meals). Indications: renal osteodystrophy with hyperphosphatemia  Qty: 180 Tablet, Refills: 0  Start date: 12/3/2021    Associated Diagnoses: Secondary hyperparathyroidism of renal origin ([facility] [address] End-stage renal disease on hemodialysis ([facility] [address] Hyperphosphatemia      sodium zirconium cyclosilicate (LOKELMA) 5 gram powder packet Take 1 Packet by mouth daily. Indications: high levels of potassium in the blood  Qty: 30 Packet, Refills: 0  Start date: 12/3/2021    Associated Diagnoses: Hyperkalemia         CONTINUE these medications which have CHANGED    Details   allopurinoL (ZYLOPRIM) 100 mg tablet Take 1 Tablet by mouth every Monday, Wednesday, Friday. [after hemodialysis]  Indications: treatment to prevent acute gout attack  Qty: 12 Tablet, Refills: 0  Start date: 12/3/2021    Associated Diagnoses: Uric acid nephrolithiasis      HYDROmorphone (DILAUDID) 2 mg tablet Take 0.5 Tablets by mouth every eight (8) hours as needed (for pain level 5/10 or greater) for up to 5 days. Max Daily Amount: 3 mg. Indications: pain  Qty: 8 Tablet, Refills: 0  Start date: 12/2/2021, End date: 12/7/2021    Associated Diagnoses: Multiple closed fractures of pelvis without disruption of pelvic ring with routine healing, subsequent encounter         CONTINUE these medications which have NOT CHANGED    Details   midodrine (PROAMATINE) 10 mg tablet Take 1 Tablet by mouth three (3) times daily (with meals) for 30 days.   Qty: 90 Tablet, Refills: 0  Start date: 11/19/2021, End date: 12/19/2021      acetaminophen (Tylenol Extra Strength) 500 mg tablet Take 1 Tablet by mouth every six (6) hours as needed for Pain. Qty: 30 Tablet, Refills: 0      lidocaine (LIDODERM) 5 % Apply patch to the affected area for 12 hours a day and remove for 12 hours a day. Qty: 1 Each, Refills: 0      gabapentin (NEURONTIN) 100 mg capsule Take 2 Capsules by mouth Three (3) times a week. Max Daily Amount: 200 mg. Take 2 capsules by mouth 3 times a week as needed for pain post dialysis. Indications: concern for back pain post dialysis  Qty: 15 Capsule, Refills: 0    Associated Diagnoses: Type 2 diabetes mellitus with diabetic autonomic neuropathy, without long-term current use of insulin ([facility]); ESRD (end stage renal disease) ([facility])      apixaban (ELIQUIS) 5 mg tablet Take 1 Tablet by mouth two (2) times a day. Qty: 60 Tablet, Refills: 0      meclizine (ANTIVERT) 25 mg tablet Take 25 mg by mouth three (3) times daily as needed for Dizziness. methoxy peg-epoetin beta (MIRCERA INJECTION) 30 mcg. DULoxetine (Cymbalta) 20 mg capsule Take 20 mg by mouth daily. estradioL (Estrace) 0.01 % (0.1 mg/gram) vaginal cream Apply a fingertip amount around the urethra three times a week. Qty: 30 g, Refills: 3      biotin 1,000 mcg chew Take 1 Tab by mouth daily. cyanocobalamin 1,000 mcg tablet Take 1,000 mcg by mouth daily. lactobacillus sp. 50 billion cpu (BIO-K PLUS) 50 billion cell -375 mg cap capsule Take 1 Cap by mouth daily. Qty: 30 Cap, Refills: 2      ascorbic acid, vitamin C, (VITAMIN C) 500 mg tablet Take 500 mg by mouth daily. calcitRIOL (ROCALTROL) 0.25 mcg capsule Take 0.25 mcg by mouth daily. cholecalciferol (VITAMIN D3) (2,000 UNITS /50 MCG) cap capsule Take 2,000 Units by mouth two (2) times a day. Take two tabs a total of 4000 units      latanoprost (XALATAN) 0.005 % ophthalmic solution Administer 1 Drop to both eyes nightly.  One drop at bedtime levothyroxine (SYNTHROID) 125 mcg tablet Take 125 mcg by mouth Daily (before breakfast). omeprazole (PRILOSEC) 20 mg capsule Take 20 mg by mouth daily. ondansetron (ZOFRAN ODT) 4 mg disintegrating tablet Take 4 mg by mouth every eight (8) hours as needed for Nausea or Vomiting. vit B Cmplx 3-FA-Vit C-Biotin (NEPHRO HOWIE RX) 1- mg-mg-mcg tablet Take 1 Tab by mouth daily. STOP taking these medications       fludrocortisone (FLORINEF) 0.1 mg tablet Comments:   Reason for Stopping:         fludrocortisone (FLORINEF) 0.1 mg tablet Comments:   Reason for Stopping:         lidocaine 4 % patch Comments:   Reason for Stopping:         b complex vitamins (Vitamins B Complex) tablet Comments:   Reason for Stopping:               DISCHARGE VITAL SIGNS:  Visit Vitals  /66 (BP 1 Location: Right upper arm, BP Patient Position: Sitting)   Pulse 74   Temp 97.5 °F (36.4 °C)   Resp 20   Ht 5' 2\" (1.575 m)   Wt 95 kg (209 lb 8 oz)   SpO2 96%   BMI 38.32 kg/m²       DISCHARGE PHYSICAL EXAMINATION:  GENERAL SURVEY: Patient is awake, alert, oriented x 3, sitting comfortably on the chair, not in acute respiratory distress. HEENT: pink palpebral conjunctivae, anicteric sclerae, no nasoaural discharge, moist oral mucosa  NECK: supple, no jugular venous distention, no palpable lymph nodes  CHEST/LUNGS: symmetrical chest expansion, good air entry, clear breath sounds  HEART: adynamic precordium, good S1 S2, no S3, regular rhythm, no murmurs  ABDOMEN: obese, bowel sounds appreciated, soft, non-tender  EXTREMITIES: pink nailbeds, no edema, full and equal pulses, no calf tenderness   NEUROLOGICAL EXAM: The patient is awake, alert and oriented x3, able to answer questions fairly appropriately, able to follow 1 and 2 step commands. Able to tell time from the wall clock. Cranial nerves II-XII are grossly intact. No gross sensory deficit.   Motor strength is 4 to 4+/5 on BUE and RLE, 2+ to 3-/5 on the left hip, 3 to 3+/5 on the left knee and left ankle. CONDITION ON DISCHARGE: Stable. DISPOSITION: Patient clinically improved and was discharged to home with home health physical therapy and occupational therapy. The patient is temporarily homebound secondary to functional deficits due to Multiple closed pelvic fractures (comminuted fracture involving the left anterior acetabular wall with involvement of the base of the left superior pubic ramus which are not significantly displaced; nondisplaced left inferior pubic ramus fracture). The patient can ambulate using a rolling walker (see above). The patient would benefit from continued skilled physical therapy in order to improve independent functional mobility within the home with use of least restrictive device. The patient would also benefit from continued skilled occupational therapy in order to improve self care and functional mobility within the home with use of least restrictive device. FOLLOW-UP RECOMMENDATIONS:   Follow-up Information     Follow up With Specialties Details Why Contact Info    Stanton Hamilton MD Family Medicine On 12/14/2021 Patient has an appointment scheduled with PCP Dr. Boston Gustafson on December 14, 2021 @ 10:40am. [address]      Correne Bamberger, MD Orthopedic Surgery On 12/8/2021 Patient has an appointment scheduled with Orthopedic Dr. Brody Lu on December 8, 2021 @ 10:10am. [facility]  [address]  [address] [address] [place]      Michael Sargent MD Urology  Gross hematuria; History of recurrent urinary tract infection; History of hydronephrosis; History of kidney stones [address] Count includes the [facility] [id number] [phone]      [address] [facility]  [address] [facility]  [phone]          OTHER INSTRUCTIONS:  1. Diet.    > Specifications: 3 carb choices (45 gm/meal);  Low fat/Low cholesterol/High fiber/IRINEO; Low potassium (less than 3,000 mg/day); Low phosphorus (less than 1,000 mg/day)   > Solids (consistency): Regular   > Liquids (consistency): Thin   > Fluid restriction: None  2. Activity. As tolerated. 3. Safety / fall precautions. 4. Weightbearing status. Partial (50%) weightbearing on the left lower extremity. TIME SPENT ON DISCHARGE ACTIVITIES: 33 minutes.       Signed:  Mojgan Webster MD    12/3/2021

## 2021-12-03 NOTE — PROGRESS NOTES
RENAL DAILY PROGRESS NOTE            63y F with PMH ESRD admitted in rehab after pelvic fracture, following for ESRD management   Subjective:       Complaint:   Overnight events noted  Examine during dialysis  Planning for discharge noted      IMPRESSION:   ESRD, MWF  Access; left arm fistula   Anemia , on epogen   H/o chronic UTI h/o right ureteral stent  Close pelvic fracture   Atrial fibrilation    PLAN:   HD today with 2K bath UF goal 2L as tolerated. contniue lokelma 5mg daily  For now. Okay to discharge from renal stand point. On 12/3/2021, I saw and examined patient during hemodialysis treatment. The patient was receiving hemodialysis for treatment of  renal failure. I have also reviewed vital signs, intake and output, lab results and recent events, and agreed with today's dialysis order. HD rounding    Blood pressure (!) 95/50, pulse 77, temperature 97.8 °F (36.6 °C), temperature source Oral, resp. rate 18, height 5' 2\" (1.575 m), weight 95 kg (209 lb 8 oz), SpO2 96 %.   Temp (24hrs), Av.4 °F (36.3 °C), Min:97 °F (36.1 °C), Max:97.8 °F (36.6 °C)      Blood Pressure: BP: (!) 95/50  Pulse: Pulse (Heart Rate): 77  Temp:  Temp: 97.8 °F (36.6 °C)    Artificial Kidney Dialyzer/Set Up Inspection: Revaclear   hours Duration of Treatment (hours): 3 hours   Heparin Bolus    Blood flow rate Blood Flow Rate (ml/min): 350 ml/min   Dialysate rate     Arterial Access Pressure Arterial Access Pressure (mmHg): -150  Venous Return Pressure Venous Return Pressure (mmHg): 170  Ultrafiltration Rate Goal/Amount of Fluid to Remove (mL): 2000 mL  Fluid Removal Fluid Removed (mL): 2150  Net Fluid Removal NET Fluid Removed (mL): 1650 ml          Current Facility-Administered Medications   Medication Dose Route Frequency    estradioL (ESTRACE) 0.01 % (0.1 mg/gram) vaginal cream 2 g  2 g Vaginal DAILY    sodium zirconium cyclosilicate (LOKELMA) powder packet 5 g  5 g Oral DAILY    sevelamer carbonate (RENVELA) tab 1,600 mg  1,600 mg Oral TID WITH MEALS    lidocaine-prilocaine (EMLA) 2.5-2.5 % cream   Topical BID    midodrine (PROAMATINE) tablet 5 mg  5 mg Oral TID WITH MEALS    allopurinoL (ZYLOPRIM) tablet 100 mg  100 mg Oral Q MON, WED & FRI    epoetin caitlin-epbx (RETACRIT) injection 8,000 Units  8,000 Units SubCUTAneous Q MON, WED & FRI    cetirizine (ZYRTEC) tablet 10 mg  10 mg Oral DAILY    acetaminophen (TYLENOL) tablet 650 mg  650 mg Oral Q4H PRN    bisacodyL (DULCOLAX) tablet 10 mg  10 mg Oral Q48H PRN    amiodarone (CORDARONE) tablet 200 mg  200 mg Oral DAILY    acetaminophen (TYLENOL) tablet 650 mg  650 mg Oral TID    apixaban (ELIQUIS) tablet 5 mg  5 mg Oral BID    HYDROmorphone (DILAUDID) tablet 1 mg  1 mg Oral Q8H PRN    meclizine (ANTIVERT) tablet 12.5 mg  12.5 mg Oral TID PRN    DULoxetine (CYMBALTA) capsule 20 mg  20 mg Oral DAILY    vitamin B complex-folic acid 0.4 mg tablet  1 Tablet Oral DAILY    cyanocobalamin tablet 1,000 mcg  1,000 mcg Oral DAILY    L. acidophilus,casei,rhamnosus (BIO-K PLUS) capsule 1 Capsule  1 Capsule Oral DAILY    ascorbic acid (vitamin C) (VITAMIN C) tablet 500 mg  500 mg Oral DAILY    cholecalciferol (VITAMIN D3) (1000 Units /25 mcg) tablet 2,000 Units  2,000 Units Oral BID    latanoprost (XALATAN) 0.005 % ophthalmic solution 1 Drop  1 Drop Both Eyes QPM    levothyroxine (SYNTHROID) tablet 125 mcg  125 mcg Oral 6am    pantoprazole (PROTONIX) tablet 20 mg  20 mg Oral ACB    ondansetron (ZOFRAN ODT) tablet 4 mg  4 mg Oral TID PRN    lidocaine 4 % patch 1 Patch  1 Patch TransDERmal Q24H    gabapentin (NEURONTIN) capsule 200 mg  200 mg Oral Q MON, WED & FRI    doxercalciferoL (HECTOROL) 4 mcg/2 mL injection 4 mcg  4 mcg IntraVENous DIALYSIS MON, WED & FRI       Review of Symptoms: comprehensive ROS negative except above.    Objective:     Patient Vitals for the past 24 hrs:   Temp Pulse Resp BP SpO2   12/03/21 1115 -- 77 -- (!) 95/50 --   12/03/21 1100 -- -- -- (!) 131/52 --   12/03/21 1045 -- 76 -- (!) 102/44 --   12/03/21 1030 -- 67 -- (!) 91/52 --   12/03/21 1015 -- 74 -- (!) 99/54 --   12/03/21 1000 -- 76 -- (!) 103/46 --   12/03/21 0949 97.8 °F (36.6 °C) 72 18 (!) 103/47 --   12/03/21 0742 97.5 °F (36.4 °C) 74 20 113/66 96 %   12/02/21 2109 97 °F (36.1 °C) 65 18 124/62 94 %   12/02/21 2030 -- -- -- -- 94 %   12/02/21 1744 -- 80 -- (!) 94/53 --   12/02/21 1605 97.1 °F (36.2 °C) 66 16 124/67 98 %   12/02/21 1148 -- 72 -- 124/68 --        Weight change:      12/01 1901 - 12/03 0700  In: 1200 [P.O.:1200]  Out: 0     Intake/Output Summary (Last 24 hours) at 12/3/2021 1138  Last data filed at 12/3/2021 0918  Gross per 24 hour   Intake 480 ml   Output --   Net 480 ml     Physical Exam:   General: comfortable, no acute distress   HEENT sclera anicteric, supple neck, no thyromegaly  CVS: S1S2 heard,  no rub  RS: + air entry b/l,   Abd: Soft, Non tender,  Neuro: non focal, awake, alert , CN II-XII are grossly intact  Extrm: edema, no cyanosis, clubbing   Skin: no visible  Rash  Musculoskeletal: No gross joints or bone deformities         Data Review:     LABS:   Hematology:   Recent Labs     12/03/21  0641 12/01/21 0622   WBC 6.2 5.7   HGB 10.0* 8.8*   HCT 33.3* 28.1*     Chemistry:   Recent Labs     12/03/21  0641 12/01/21 0622   BUN 43* 52*   CREA 6.73* 6.51*   CA 9.6 9.1   ALB 3.5 3.0*   K 5.0 5.9*    133*    99*   CO2 26 25   PHOS 6.9* 7.2*   * 83            Procedures/imaging: see electronic medical records for all procedures, Xrays and details which were not copied into this note but were reviewed prior to creation of Plan          Assessment & Plan:     As above         Trini Silva MD  12/3/2021  2:33 PM

## 2021-12-04 VITALS
TEMPERATURE: 97.7 F | HEIGHT: 62 IN | HEART RATE: 79 BPM | RESPIRATION RATE: 18 BRPM | DIASTOLIC BLOOD PRESSURE: 51 MMHG | SYSTOLIC BLOOD PRESSURE: 117 MMHG | BODY MASS INDEX: 38.55 KG/M2 | WEIGHT: 209.5 LBS | OXYGEN SATURATION: 96 %

## 2021-12-05 ENCOUNTER — HOME CARE VISIT (OUTPATIENT)
Dept: SCHEDULING | Facility: HOME HEALTH | Age: 76
End: 2021-12-05
Payer: MEDICARE

## 2021-12-05 VITALS
OXYGEN SATURATION: 96 % | TEMPERATURE: 97.8 F | HEART RATE: 78 BPM | DIASTOLIC BLOOD PRESSURE: 70 MMHG | RESPIRATION RATE: 17 BRPM | SYSTOLIC BLOOD PRESSURE: 130 MMHG

## 2021-12-05 PROCEDURE — 400013 HH SOC

## 2021-12-05 PROCEDURE — G0151 HHCP-SERV OF PT,EA 15 MIN: HCPCS

## 2021-12-05 NOTE — HOME HEALTH
Skilled services/Home bound verification:     Skilled Reason for admission/summary of clinical condition:  female patient with  . Lt hip showed comminuted fracture involving the left anterior acetabular wall with involvement of the base of the left superior pubic ramus whic are not signficiantly displaced. Also nondisplaced left inferior pubic ramus fracture  This patient is homebound for the following reasons Requires considerable and taxing effort to leave the home . Caregiver: relative. Caregiver assists with IADL's. Medications reconciled and all medications are not available in the home this visit. the following meds are still at pharmacy at time of admit:   amiodarone  renvela  lokelma   vitamin c  vit d  cyanocobalamin  estradiol    The following education was provided regarding medications, medication interactions, and look alike medications (specify): Tamie Graven Medications  are effective at this time. High risk medication teaching regarding anticoagulants, hyperglycemic agents or opiod narcotics performed (specify) dilaudid for risk of overdose and eliquis for risk of bleeding ,    Dr. Mike Reinoso notified of any discrepancies/medication interactions no severe interaction . Home health supplies by type and quantity ordered/delivered this visit include: none    Patient education provided this visit to include: HEP, fall prevention, dc planning . Patient/caregiver degree of understanding:good    Home exercise program/Homework provided: patient educated with HEP including seated hip flex, knee extension, hip abduction, hip adduction, ankle PF and DF and ball squeeze x 20 reps x 3 sets daily to improve MMT on BLE to facilitate with improved bed mobility, transfers and gait with AD. patient also educated with deep breathing exercises to be done daily x 10 reps x 3 sets to prevent SOB during activity.  Patient educated with fall prevention technique by decluttering space, proper use of AD and footwear    Pt/Caregiver instructed on plan of care and are agreeable to plan of care at this time. Physician Dr. Lyly Ramirez notified of patient admission to home health and plan of care including anticipated frequency of 3w1 2w2 for PT   Discharge planning discussed with patient and caregiver. Discharge planning as follows: dc to family with MD supervision when all goals are met . Pt/Caregiver did verbalize understanding of discharge planning. Next MD appointment TBD  Patient/caregiver encouraged/instructed to keep appointment as lack of follow through with physician appointment could result in discontinuation of home care services for non-compliance. PMHx: Anemia in end-stage renal disease ([facility] [address]      Anticoagulated by anticoagulation treatment  On Apixaban    Chronic hypotension  On Midodrine    Chronic pain      Closed fracture of left inferior pubic ramus, with routine healing, subsequent encounter 11/12/2021     Closed fracture of superior pubic ramus, left, with routine healing, subsequent encounter 11/12/2021     Closed nondisplaced fracture of anterior wall of left acetabulum with routine healing 11/12/2021     Depression      End-stage renal disease on hemodialysis ([facility] [address]  HD at [address] [facility] on [address] [employer] on MWF.  Tel # [phone]    Gastroesophageal reflux disease      Glaucoma      History of acute pyelonephritis 2/20/2020     History of hydronephrosis 10/5/2021     History of infection with vancomycin resistant Enterococcus (VRE) 10/8/2021 Urine culture (collected 10/8/2021, resulted 10/14/2021) yielded growth of >100,000 colonies/ml of Enterococcus faecalis RESISTANT to Ciprofloxacin, Levofloxacin, Tetracycline and Vancomycin    History of kidney stones      History of recurrent urinary tract infection      History of sepsis 6/18/2021     History of septic shock 10/8/2021     History of urethral stricture      History of urinary tract infection 10/8/2021 Urine culture (collected 10/8/2021, resulted 10/14/2021) yielded growth of >100,000 colonies/ml of Enterococcus faecalis RESISTANT to Ciprofloxacin, Levofloxacin, Tetracycline and Vancomycin    Hyperlipidemia      Hyperphosphatemia 11/14/2021     Hypothyroidism      Lung mass      Mononeuropathy  Involving ring finger of left hand    Need for prophylactic isolation 10/8/2021 Urine culture (collected 10/8/2021, resulted 10/14/2021) yielded growth of >100,000 colonies/ml of Enterococcus faecalis RESISTANT to Ciprofloxacin, Levofloxacin, Tetracycline and Vancomycin    Paroxysmal atrial fibrillation (HCC)      Secondary hyperparathyroidism of renal origin ([facility] [address]      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 Her son and daughter-in-law was able to assist with IADL's  STRENGTH R hip flexor, extensor 4-/5, R knee flexor and extenso4 4-/5, L hip flexor and extensor 3/5, L knee flexor and extensor 3/5  SIT TO STAND with Min A x 1 from chair   BALANCE  Tinetti 11/28 high fall risk due to reduced strength on BLE, gait deviation and decreased efficiency of balance reactions. Patient demonstrated poor use of hip and ankle strategy during standing balance activity. Patient requires support from AD at all times for safety and stability. GAIT Patient was able to ambulate with RW x 20 feet with Min A x 1 and consistent verbal cues for 50% weightbearing status on LLE.  Patient with noted antalgic gait and decreased step height on LLE.   BED MOBILITY sleeps on recliner at this time and needed Min A x1 to get in and out of recliner   TRANSFERS Patient needed Mod A x 1 with verbal cues to and from chair and toilet  A: PT evaluation completed with the presence of  son who is the primary CG, POC established, Med rec done, HEP established  P:Home Safety eval/recommendations: Home health physical therapy initial evaluation performed. Patient demonstrates decreased strength, balance, and endurance which increases patient's overall fall risk and burden of care. Patient would benefit from home health physical therapy to improve balance, strength, and endurance which would decrease fall risk and allow patient to return to prior level of function once all functional goals or full rehab potential is met. patient educated with HEP including seated hip flex, knee extension, hip abduction, hip adduction, ankle PF and DF and ball squeeze x 20 reps x 3 sets daily to improve MMT on BLE to facilitate with improved bed mobility, transfers and gait with AD. patient also educated with deep breathing exercises to be done daily x 10 reps x 3 sets to prevent SOB during activity.  Patient educated with fall prevention technique by decluttering space, proper use of AD and footwear

## 2021-12-07 ENCOUNTER — HOME CARE VISIT (OUTPATIENT)
Dept: SCHEDULING | Facility: HOME HEALTH | Age: 76
End: 2021-12-07
Payer: MEDICARE

## 2021-12-07 VITALS
DIASTOLIC BLOOD PRESSURE: 60 MMHG | HEART RATE: 98 BPM | SYSTOLIC BLOOD PRESSURE: 110 MMHG | OXYGEN SATURATION: 98 % | TEMPERATURE: 97.9 F

## 2021-12-07 PROCEDURE — G0151 HHCP-SERV OF PT,EA 15 MIN: HCPCS

## 2021-12-07 NOTE — HOME HEALTH
S:  Patient reports that she has been sloeeping downstairs in a chair/recliner, because she did not feel safe using the stair lift with the WB status 50%. She wants to be able to sleep in her bed. GAIT:  See intervention  STAIRS:  Attempted to access stairlift, but it was locked, unable to use it today for training. TRANSFERS:  Uses hand support, SBA for safety, VC to keep walker close and for correct sequencing. Ther-ex:  see HEP below. PATIENT EDUCATION PROVIDED: Purpose of the exercises, correct sequencing of transfer and gait, WB ststus and ed re: correct use of walker for 50% WB.    HOME EXERCISE PROGRAM:   Seated: Ankle pumps, Hip abd and knee raises with red theraband, LAQ, x 10-20 reps  PLAN:  CONTINUED NEED for the following skills: Physical Therapy  Pt/Caregiver instructed on plan of care and are agreeable to plan of care at this time. DISCHARGE PLANNING discussed with patient and caregiver. Discharge planning as follows: Continue PT 1 more visit this week, then next week plan to d/c or recert pending orthopedic follow up visit on 12/9/21. Pt/Caregiver did verbalize understanding.

## 2021-12-08 ENCOUNTER — HOME CARE VISIT (OUTPATIENT)
Dept: HOME HEALTH SERVICES | Facility: HOME HEALTH | Age: 76
End: 2021-12-08
Payer: MEDICARE

## 2021-12-09 ENCOUNTER — HOME CARE VISIT (OUTPATIENT)
Dept: SCHEDULING | Facility: HOME HEALTH | Age: 76
End: 2021-12-09
Payer: MEDICARE

## 2021-12-09 ENCOUNTER — OFFICE VISIT (OUTPATIENT)
Dept: ORTHOPEDIC SURGERY | Age: 76
End: 2021-12-09
Payer: MEDICARE

## 2021-12-09 VITALS — HEART RATE: 87 BPM | TEMPERATURE: 98.4 F | OXYGEN SATURATION: 96 % | HEIGHT: 62 IN | BODY MASS INDEX: 38.32 KG/M2

## 2021-12-09 DIAGNOSIS — S32.82XA MULTIPLE CLOSED FRACTURES OF PELVIS WITHOUT DISRUPTION OF PELVIC RING, INITIAL ENCOUNTER (HCC): ICD-10-CM

## 2021-12-09 DIAGNOSIS — M25.552 LEFT HIP PAIN: Primary | ICD-10-CM

## 2021-12-09 PROCEDURE — 1111F DSCHRG MED/CURRENT MED MERGE: CPT | Performed by: PHYSICIAN ASSISTANT

## 2021-12-09 PROCEDURE — 1090F PRES/ABSN URINE INCON ASSESS: CPT | Performed by: PHYSICIAN ASSISTANT

## 2021-12-09 PROCEDURE — G0151 HHCP-SERV OF PT,EA 15 MIN: HCPCS

## 2021-12-09 PROCEDURE — G8536 NO DOC ELDER MAL SCRN: HCPCS | Performed by: PHYSICIAN ASSISTANT

## 2021-12-09 PROCEDURE — G8428 CUR MEDS NOT DOCUMENT: HCPCS | Performed by: PHYSICIAN ASSISTANT

## 2021-12-09 PROCEDURE — 99203 OFFICE O/P NEW LOW 30 MIN: CPT | Performed by: PHYSICIAN ASSISTANT

## 2021-12-09 PROCEDURE — G8417 CALC BMI ABV UP PARAM F/U: HCPCS | Performed by: PHYSICIAN ASSISTANT

## 2021-12-09 PROCEDURE — 1101F PT FALLS ASSESS-DOCD LE1/YR: CPT | Performed by: PHYSICIAN ASSISTANT

## 2021-12-09 PROCEDURE — G9717 DOC PT DX DEP/BP F/U NT REQ: HCPCS | Performed by: PHYSICIAN ASSISTANT

## 2021-12-09 PROCEDURE — G8400 PT W/DXA NO RESULTS DOC: HCPCS | Performed by: PHYSICIAN ASSISTANT

## 2021-12-09 PROCEDURE — 73502 X-RAY EXAM HIP UNI 2-3 VIEWS: CPT | Performed by: PHYSICIAN ASSISTANT

## 2021-12-09 NOTE — PROGRESS NOTES
Patient: Alon Fajardo                MRN: [id number]       SSN: [id number]  YOB: 1945        AGE: 64 y.o. SEX: female          PCP: Heather Traore MD  12/09/21    Chief Complaint   Patient presents with    Hip Pain     Left       HISTORY:  Alon Fajardo is a 64 y.o. female presents to the office with her caregiver status post fall at home resulting in pelvic fractures on the left. She is currently per spitting and in-home physical therapy. She is about 50% weightbearing of her left lower extremity. Generally she reports doing well her pain is improved since the initial fall. She denies any bowel or bladder incontinence. She does have a ureter stenting that was performed many years ago and has retained stent material.    No blood in her urine or stool status post fall.       Pain Assessment  12/9/2021   Location of Pain Hip   Location Modifiers Left   Severity of Pain 5   Quality of Pain Dull   Duration of Pain Persistent   Frequency of Pain Constant   Aggravating Factors Walking   Limiting Behavior Yes   Relieving Factors Other (Comment)   Relieving Factors Comment Tylenol and other pain meds   Result of Injury Yes   Work-Related Injury No   Type of Injury Fall           Lab Results   Component Value Date/Time    Hemoglobin A1c 4.6 10/08/2021 03:50 PM     Weight Metrics 12/9/2021 11/30/2021 11/19/2021 11/19/2021 11/12/2021 11/7/2021 10/14/2021   Weight - 209 lb 8 oz - 209 lb 1.6 oz - 205 lb 0.4 oz 212 lb 14.4 oz   BMI 38.32 kg/m2 - 38.32 kg/m2 - 38.24 kg/m2 37.5 kg/m2 38.94 kg/m2            Problem List Items Addressed This Visit        Orthopedic Problems    Multiple closed pelvic fractures without disruption of pelvic ring (HCC)      Other Visit Diagnoses     Left hip pain    -  Primary    Relevant Orders    AMB POC X-RAY RADEX HIP UNI WITH PELVIS 2-3 VIEWS (Completed)          PAST MEDICAL HISTORY:   Past Medical History:   Diagnosis Date  Anemia in end-stage renal disease ([facility] [address]     Anticoagulated by anticoagulation treatment     On Apixaban    Chronic hypotension     On Midodrine    Chronic pain     Closed fracture of left inferior pubic ramus, with routine healing, subsequent encounter 11/12/2021    Closed fracture of superior pubic ramus, left, with routine healing, subsequent encounter 11/12/2021    Closed nondisplaced fracture of anterior wall of left acetabulum with routine healing 11/12/2021    Depression     End-stage renal disease on hemodialysis ([facility] [address]     HD at [facility] on [facility].  Tel # [phone]    Gastroesophageal reflux disease     Glaucoma     History of acute pyelonephritis 2/20/2020    History of hydronephrosis 10/5/2021    History of infection with vancomycin resistant Enterococcus (VRE) 10/8/2021    Urine culture (collected 10/8/2021, resulted 10/14/2021) yielded growth of >100,000 colonies/ml of Enterococcus faecalis RESISTANT to Ciprofloxacin, Levofloxacin, Tetracycline and Vancomycin    History of kidney stones     History of recurrent urinary tract infection     History of sepsis 6/18/2021    History of septic shock 10/8/2021    History of urethral stricture     History of urinary tract infection 10/8/2021    Urine culture (collected 10/8/2021, resulted 10/14/2021) yielded growth of >100,000 colonies/ml of Enterococcus faecalis RESISTANT to Ciprofloxacin, Levofloxacin, Tetracycline and Vancomycin    Hyperlipidemia     Hyperphosphatemia 11/14/2021    Hypothyroidism     Lung mass     Mononeuropathy     Involving ring finger of left hand    Need for prophylactic isolation 10/8/2021    Urine culture (collected 10/8/2021, resulted 10/14/2021) yielded growth of >100,000 colonies/ml of Enterococcus faecalis RESISTANT to Ciprofloxacin, Levofloxacin, Tetracycline and Vancomycin    Paroxysmal atrial fibrillation (HCC)     Secondary hyperparathyroidism of renal origin ([facility] [address]     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 ([facility] [address]     HbA1c (10/8/2021) = 4.6    Uric acid nephrolithiasis     Urinary incontinence        PAST SURGICAL HISTORY:   Past Surgical History:   Procedure Laterality Date    HX APPENDECTOMY      HX CHOLECYSTECTOMY      HX GASTRIC BYPASS      Gastric stapling    HX KNEE ARTHROSCOPY      HX UROLOGICAL      right PCN placement    HX UROLOGICAL  07/23/2018    RIGHT URETEROSCOPY WITH HOLMIUM LASER    IR EXCHANGE NEPHRO PERC LT SI  2/21/2020    IR EXCHANGE NEPHRO PERC RT SI  4/13/2020    IR EXCHANGE NEPHRO PERC RT SI  7/17/2020    IR NEPHROSTOMY PERC RT PLC CATH  SI  10/14/2020    IR NEPHROURETERAL PERC RT PLC CATH NEW ACCESS  SI  4/30/2020    NH INTRO CATH DIALYSIS CIRCUIT DX ANGRPH FLUOR S&I Left 9/24/2020    FISTULOGRAM LEFT/poss permanent catheter placement performed by London Joshi MD at [facility] CATH LAB    VASCULAR SURGERY PROCEDURE UNLIST      lef AVF       ALLERGIES:   Allergies   Allergen Reactions    Albumin, Human 25 % Itching     Headache - severe migraine like, itchy eyes, runny nose    Ciprofloxacin Hives    Cyclopentolate Unknown (comments)    Iron Sucrose Diarrhea    Statins-Hmg-Coa Reductase Inhibitors Other (comments)     Body ache        CURRENT MEDICATIONS:  A list of medications prior to the time of admission include:  Prior to Admission medications    Medication Sig Start Date End Date Taking? Authorizing Provider   allopurinoL (ZYLOPRIM) 100 mg tablet Take 1 Tablet by mouth every Monday, Wednesday, Friday. [after hemodialysis]  Indications: treatment to prevent acute gout attack 12/3/21  Yes Laisha Foster MD   amiodarone (CORDARONE) 200 mg tablet Take 1 Tablet by mouth daily. Indications: prevention of recurrent atrial fibrillation 12/3/21  Yes Laisha Foster MD   sevelamer carbonate (RENVELA) 800 mg tab tab Take 2 Tablets by mouth three (3) times daily (with meals).  Indications: renal osteodystrophy with hyperphosphatemia 12/3/21 Yes Kostas Samuel MD   sodium zirconium cyclosilicate (LOKELMA) 5 gram powder packet Take 1 Packet by mouth daily. Indications: high levels of potassium in the blood 12/3/21  Yes Kostas Samuel MD   midodrine (PROAMATINE) 10 mg tablet Take 1 Tablet by mouth three (3) times daily (with meals) for 30 days. 11/19/21 12/19/21 Yes Mark Cruz MD   acetaminophen (Tylenol Extra Strength) 500 mg tablet Take 1 Tablet by mouth every six (6) hours as needed for Pain. 11/7/21  Yes Violeta Murray MD   lidocaine (LIDODERM) 5 % Apply patch to the affected area for 12 hours a day and remove for 12 hours a day. 11/7/21  Yes Violeta Murray MD   gabapentin (NEURONTIN) 100 mg capsule Take 2 Capsules by mouth Three (3) times a week. Max Daily Amount: 200 mg. Take 2 capsules by mouth 3 times a week as needed for pain post dialysis. Indications: concern for back pain post dialysis 10/15/21  Yes Coco Gomez MD   apixaban (ELIQUIS) 5 mg tablet Take 1 Tablet by mouth two (2) times a day. 10/14/21  Yes Coco Gomez MD   meclizine (ANTIVERT) 25 mg tablet Take 25 mg by mouth three (3) times daily as needed for Dizziness. 3/3/21  Yes Provider, Historical   methoxy peg-epoetin beta (MIRCERA INJECTION) 30 mcg. 9/20/21 9/19/22 Yes Provider, Historical   DULoxetine (Cymbalta) 20 mg capsule Take 20 mg by mouth daily. Yes Provider, Historical   estradioL (Estrace) 0.01 % (0.1 mg/gram) vaginal cream Apply a fingertip amount around the urethra three times a week. 9/30/20  Yes Mckenzie Uribe MD   biotin 1,000 mcg chew Take 1 Tab by mouth daily. Yes Provider, Historical   cyanocobalamin 1,000 mcg tablet Take 1,000 mcg by mouth daily. Yes Provider, Historical   lactobacillus sp. 50 billion cpu (BIO-K PLUS) 50 billion cell -375 mg cap capsule Take 1 Cap by mouth daily. 2/25/20  Yes Emiliano Bobby MD   ascorbic acid, vitamin C, (VITAMIN C) 500 mg tablet Take 500 mg by mouth daily.    Yes Other, MD Lexus   calcitRIOL (ROCALTROL) 0.25 mcg capsule Take 0.25 mcg by mouth daily. Yes Samira, MD Lexus   cholecalciferol (VITAMIN D3) (2,000 UNITS /50 MCG) cap capsule Take 2,000 Units by mouth two (2) times a day. Take two tabs a total of 4000 units   Yes Samira, MD Lexus   latanoprost (XALATAN) 0.005 % ophthalmic solution Administer 1 Drop to both eyes nightly. One drop at bedtime   Yes Lexus Hogan MD   levothyroxine (SYNTHROID) 125 mcg tablet Take 125 mcg by mouth Daily (before breakfast). Yes Samira, MD Lexus   omeprazole (PRILOSEC) 20 mg capsule Take 20 mg by mouth daily. Yes Lexus Hogan MD   ondansetron (ZOFRAN ODT) 4 mg disintegrating tablet Take 4 mg by mouth every eight (8) hours as needed for Nausea or Vomiting. Yes Samira, MD Lexus   vit B Cmplx 3-FA-Vit C-Biotin (NEPHRO HOWIE RX) 1- mg-mg-mcg tablet Take 1 Tab by mouth daily. Yes Samira, MD Lexus       FAMILY HISTORY:   Family History   Problem Relation Age of Onset    Heart Surgery Sister        SOCIAL HISTORY:   Social History     Socioeconomic History    Marital status: SINGLE   Tobacco Use    Smoking status: Never Smoker    Smokeless tobacco: Never Used   Vaping Use    Vaping Use: Never used   Substance and Sexual Activity    Alcohol use: Never    Drug use: Never       ROS:No CP, No SOB, No fever/chills nor night sweats. No headaches, vision abnormalities to include double and oral loss of vision. No hearing abnormalities. Musculoskeletal pain per HPI. Pain is exacerbated positionally. Pt denies h/o spinal surgery, injections, or PT/chiropractor. Self treated with less than adequate relief on oral antiinflammatories. . Pt denies change in bowel or bladder habits. Pt denies fever, weight loss, or skin changes. PHYSICAL EXAM:    Visit Vitals  Pulse 87   Temp 98.4 °F (36.9 °C) (Temporal)   Ht 5' 2\" (1.575 m)   SpO2 96%   BMI 38.32 kg/m²       Constitutional: Appears well-developed and well-nourished. No distress.  Sitting comfortably in the exam room in a wheelchair. , interacting with conversation with pleasant affect. . No focal neurological deficit noted. No facial droop, slurred speech, or evidence of altered mentation noted on exam.   Skin: Skin over the head, neck, bilateral limbs, and trunk is warm and dry. No rash or erythema noted. Cranial Nerves II-XII grossly intact  HENT: NC/AT. Normal symmetry, bulk and tone of facial and neck musculature. Trachea midline. No discernible thyromegaly or masses. No involuntary movements. Lymphatic: No preauricular, submandibuar, anterior or posterior cervical lymphadenopathy. Psychiatric: The patient is awake, alert, and oriented to person, place and time. Behavior is normal. Thought content normal.   Cardiovascular: No clubbing, cyanosis. No edema bilateral lower extremities. Pulmonary: No tripoding nor accessory muscle recruitment. Breathing normally, no distress, no audible wheezing. Distal cap refill intact at 2/2 Tree UE / LE. Neuro intact Tree UE/LE to noxious stimuli        Ortho Specific exam:      Patient seated at bedside in a wheelchair left knee hip flexor strength guarded reproducing pain to the left pelvis noted at 3+/5. Passive internal or external rotation of the hip left at 8 and 12 degrees reproduce pain in the left deep pelvis. Abduction testing weak against resistance at 3+/5 and abduction motor strength testing 3/5. Left knee range of motion 105 degrees -[facility] and a left hip reveals superior and inferior left ramus fractures with early healing identified. No soft tissue ossifications identified. No other fracture formed lesions or masses noted. IMPRESSION:      ICD-10-CM ICD-9-CM    1.  Left hip pain  M25.552 719.45 AMB POC X-RAY RADEX HIP UNI WITH PELVIS 2-3 VIEWS   2. Multiple closed fractures of pelvis without disruption of pelvic ring[facility]  S32.82XA 808.44         PLAN: Today we discussed alternatives care to include but not limited to continuing weightbearing from 50% increasing to 75 with physical therapy assistance. Patient will follow back in about a month. Continue home therapy through after the first of the year. X-rays reviewed today copies provided all of her and her caregivers questions were answered to their satisfaction. Additionally today we discussed the diagnosis of obesity and the importance of weight management for both cardiovascular health. The patient was recommended to decrease carbohydrate and sugar intake. Patient recommended a formal dietary consult which they will consider and return a call to our office. In light of the patient's osteoarthritic findings I am making a recommendation for aerobic exercise to include but not limited to stationary bicycle, elliptical, therapeutic walking with good shoes and or swimming. Patient should avoid any running or jumping. If using the treadmill then recommendation for no elevation and no running or jogging. No Narcotic indicated today. Patient given pain medication for short term acute pain relief. Goal is to treat patient according to above plan and to ultimately have patient off all pain medications once appropriate. If chronic pain management is required beyond what is expected for current orthopedic problem, will refer patient to pain management.  was reviewed and will be reviewed with every medication refill request.         Patient provided a reminder for a \"due or due soon\" health maintenance. I have asked the patient to schedule an appointment with their primary care provider for follow-up on general health maintenance concerns. Today all the patient's questions were answered to their satisfaction. Copies of x-rays reviewed if obtained this visit, and provided to patient.           Dictation disclaimer:  Please note that this dictation was completed with [employer], the computer voice recognition software. Quite often unanticipated grammatical, syntax, homophones, and other interpretive errors are inadvertently transcribed by the computer software. Please disregard these errors. Please excuse any errors that have escaped final proofreading. Dio GRIGGS, APC, MPAS, PA-C  [facility]

## 2021-12-10 VITALS
HEART RATE: 56 BPM | SYSTOLIC BLOOD PRESSURE: 120 MMHG | DIASTOLIC BLOOD PRESSURE: 60 MMHG | TEMPERATURE: 97.5 F | OXYGEN SATURATION: 98 %

## 2021-12-11 ENCOUNTER — HOME CARE VISIT (OUTPATIENT)
Dept: HOME HEALTH SERVICES | Facility: HOME HEALTH | Age: 76
End: 2021-12-11
Payer: MEDICARE

## 2021-12-11 NOTE — HOME HEALTH
Caregiver involvement: Son and daughter in law assist patient as needed, both work during the day and are not home. Medications: No changes. Medications  are effective at this time. Home health supplies by type and quantity ordered/delivered this visit include: n/a    Patient education provided this visit:  Ed re: safety and fall prevention when using stairlift. Also, ed re: plan to re-assess and recert if WB status changes per orthopedic follow up visit today. Progress toward goals: Patient is progressing well with transfers and gait goals for chair and walker. She needs further visits to address stair navigation to exit home with PT observing for safety and WB compliance, L LE weakness continues to be present, and progression of HEP is needed in order to continue the strengthening. The patient has not been sleeping in her bed, and will need to be safe with use of the stairlift in irder to use her bed. She c/o not getting good sleep in the recliner. The patient also needs to be able to use the upstairs bathroom. Further HHPT is needed to address issues that prevent patient asccess to 2nd floor. Home exercise program: See ther-ex intervention. Continued need for the following skills: Physical Therapy    The following discharge planning was discussed with the pt/caregiver: Follow up next week to loisuss otrthopedic follow up visit, then determine recert frequency. Plan to recert and continue for further training on stairlift, for sontinued strengtheing, and if patient has a change in WB status.   Change in WB status will require further training  from PT.

## 2021-12-13 ENCOUNTER — HOME CARE VISIT (OUTPATIENT)
Dept: HOME HEALTH SERVICES | Facility: HOME HEALTH | Age: 76
End: 2021-12-13
Payer: MEDICARE

## 2021-12-14 ENCOUNTER — HOME CARE VISIT (OUTPATIENT)
Dept: HOME HEALTH SERVICES | Facility: HOME HEALTH | Age: 76
End: 2021-12-14
Payer: MEDICARE

## 2021-12-14 ENCOUNTER — HOME CARE VISIT (OUTPATIENT)
Dept: SCHEDULING | Facility: HOME HEALTH | Age: 76
End: 2021-12-14
Payer: MEDICARE

## 2021-12-14 VITALS
DIASTOLIC BLOOD PRESSURE: 72 MMHG | TEMPERATURE: 97.4 F | SYSTOLIC BLOOD PRESSURE: 128 MMHG | HEART RATE: 68 BPM | OXYGEN SATURATION: 98 %

## 2021-12-14 PROCEDURE — G0151 HHCP-SERV OF PT,EA 15 MIN: HCPCS

## 2021-12-14 PROCEDURE — G0152 HHCP-SERV OF OT,EA 15 MIN: HCPCS

## 2021-12-14 NOTE — Clinical Note
Patient was seen by Krystle Flores recently in October. She did not complete all her visits at that time because she requested an early discharge. She recently fell and fractured her left pelvis. She's now 75% weight-bearing. She is agreeable for OT this time because she needs help taking showers. She has a shower chair and was able to do a tub transfer with min assist. She must sit on the shower seat before lifting legs to put them in the tub/shower in order to follow weight-bearing. Please address ADL with patient. She has her old HEP from her prior encounter and is independent with it. No new HEP will be added this time.

## 2021-12-14 NOTE — HOME HEALTH
Caregiver involvement: Pt lives with her son and daughter in law. They provide assist for IADLs and supervision when needed. They are not available to be with her all day. Medications reconciled and all medications are available in the home this visit. The following education was provided regarding medications, medication interactions, and look a like medications: Continue as directed by MD.  Medications  are effective at this time. Added melatonin, hydromorphone, and amiodarone. See med list for update. Patient education provided this visit: Pt was educated on 75% left lower extremity PWB and cued to use RW for compliance. Pt educated on chair positioning and removal of clutter to allow for RW clearance to increase pt safety and adherence to WB precautions. Pt educated on safety with tub transfer using shower chair and importance of caregiver presence for tub transfers. Patient level of understanding of education provided: Pt was able to demonstrate tub transfer using shower chair with cues for sequencing, safety and WB precaution. Pt able to teach back improved chair positioning and removal of clutter to remove fall risks. Pt able to teach back and demonstrate 75% WB on left LE. Patient response to treatment: Pt able to demonstrate increased safety with mobility education on WB precaution by demonstrating 75% WB on left LE intermittently without cues. However, pt did continue to require cues throughout for WB precaution due to her impulsivity. Pt able to teach back importance of having her son or a caregiver present during her tub transfers. She was able to demonstrate how to reduce clutter in her room by expressing 2 areas that could cleaned to increase safety and allow clearance for RW. Progress toward goals:  Mrs. Rigo Bae has good rehab potential to return to her S level with self care and functional mobility. She is currently moderate assist with UB dressing due to limited AROM in her R UE. She requires minimal assistance for LB dressing with cues for safety and 75% PWB on left LE. She requires cues for safety with pt presenting with impulsivity during functional mobility. Persistent verbal, visual, and tactile cues required for 75% WB to left LE. She has support of her son and daughter in law at home but is limited by there own schedules and availability. She is agreeable to further skilled OT services to increase independence and safety with ADLs and functional mobility. Home exercise program: Pt will remove clutter in the home to allow for RW clearance in order to promote sustained adherance to WB precautions. Pt declined UE exercises at this time expressing she has handouts she prefers and still utilizes. Pt able to demonstrate 3 of these exercises with proper body machanics. Continued need for the following skills: Skilled Nursing, Physical Therapy    The following discharge planning was discussed with the pt/caregiver: 1w4 Pt agreeable to work on tub transfers, bathing, functional mobility, balance, and safety education in the home. CONTINUED NEED FOR THE FOLLOWING SKILLS: HH OT is medically necessary to address barriers of  decreased balance, decreased activity tolerance, generalized weakness and safety awareness. These barriers limit pt's participation in ADLs and functional mobility safely and would benefit from continued skilled OT to maximize independence and reduce burden on caregiver. Darell Kennedy is a 64 y.o.  female with multiple medical problems who presented to [facility] on 11/12/21 for left sided hip and leg pain following a fall a week prior. She had xrays after initial fall were negative for fracture but pain progressively worsened and was unable to bear weight.  HR was 170s on arrival. Her CT Lt hip showed comminuted fracture involving the left anterior acetabular wall with involvement of the base of the left superior pubic ramus whic are not signficiantly displaced. Also nondisplaced left inferior pubic ramus fracture. L knee xray no acute fracture. Cardiology was consulted due to tachycardia and afib as well as hypotension. Pt was admitted to family medicine under Dr. Vinh Aldrich. Per PT communication pt is now 75% partial weight bearing.

## 2021-12-14 NOTE — Clinical Note
Therapy Functional Score Assessment  Question                                            Score   Grooming                            2       Upper Dressing      2   Lower Dressing   2    Bathing                 3      Toilet Transfer                   2    Transfer                2            Ambulation                         3   Dyspnea                     2       Pain Interfering with activity        1  Est number therapy visits      4

## 2021-12-14 NOTE — Clinical Note
Thank you!    ----- Message -----  From: Yee Husain OT  Sent: 12/15/2021  10:17 AM EST  To: Tereza Huggins PT      Therapy Functional Score Assessment  Question                                            Score   Grooming                            2       Upper Dressing      2   Lower Dressing   2    Bathing                 3      Toilet Transfer                   2    Transfer                2            Ambulation                         3   Dyspnea                     2       Pain Interfering with activity        1  Est number therapy visits      4

## 2021-12-16 VITALS
TEMPERATURE: 98 F | DIASTOLIC BLOOD PRESSURE: 56 MMHG | OXYGEN SATURATION: 98 % | RESPIRATION RATE: 17 BRPM | HEART RATE: 80 BPM | SYSTOLIC BLOOD PRESSURE: 102 MMHG

## 2021-12-16 NOTE — HOME HEALTH
Physician Notification and Justification to Continue Services:     Justification for continued intermittent care: patient with rehospitalization during episode, patient with fall with injury and needs more training by therapies in order to restore safety and independence in the home. Dr. Manpreet Crocker notified of the following: the need for continued Physical Therapy and Occupational Therapy home health services for above reasons, plan of care including visit frequency of PT 2w3, OT 1w3 for : additional assessment and education on ADLs, gait, transfers, strengthening, balance, within compliance of 75% WB status. .  SUMMARY OF CLINICAL CONDITION: Patient was referred to [facility] initially after hospitalization for UTI. During the course of treatment, she fell and was re-hospitalized with multiple pelvis fractures. Her WB status was initially 50%, is now 75% in LLE. The patient is compliant with WB status aboyut 50% of the time, as she has been noted to be non-compliant with mobility in her bedroom ad bath per report of OT assessment, and per PT observation on stairlift due to complex transfers required. She needs further training to ensure safety, and due to being d/c from hospital toward end of her cert period, she did not have enough visits completed to allow for adequate teaching and re-inforcement. Arlene Carroll PRIOR MEDICAL HISTORY:       List of Comorbitites:    Acidosis      Anem ia      Arteriovenous fistula       Chronic kidney disease on HD at [address] on MWF. [phone]    Chronic pain      CKD (chronic kidney disease)      Diabetes no meds now    ESRD (end stage renal disease) on dialysis     HLD (hyperlipidemia)      HTN (hypertension)      Hyperparathyroidism due to renal insufficiency       Hypothyroid      Kidney stone      Lung mass      Recur rent UTI      Ureter, stricture      Uric acid nephrolithiasis      Urinary incontinence   .   CAREGIVER INVOLVEMENT: Son provides transportation to dialysis, he and daughter in law assist with shopping needs, household chores, meals. .    PHYSICAL THERAPY EVALUATION:  GAIT:  Fww, needs training in 75% WB. Patient verbalizes that she does not know how to determine that degree of WB.    STAIRS:  Needs training on front outside stairs to make sure she os following WB requirements. Also needs furtther training on stairlift, as thios was only addressed 1 time with the PWB status. TRANSFERS:  Uses hands, safely transfers to/from w/c, recliner. Needs further training on stairlift and upstairs on/off bed and toilet. BALANCE/ FALL RISK:  Tinetti:  , high fall risk, improved from . TU seconds, fall risk, improved from 36 seconds. BED MOBILITY:  Able to get in and out of bed, needs further assessment for sit to/from stand at bed to ensure compliance with WB status. STRENGTH: RLE maintained at 4-/5, LLE 3+/5, LLE improved from 3/5. ROM:  WFL for BLEs  . MEDICATION REVIEW COMPLETED. Medications reconciled by OT today, no changes since then. Medications  are effective at this time  [address] by type and quantity ordered/delivered this visit include: n/a  PATIENT EDUCATION PROVIDED: WB status change to 75%, needs further instruction. Progress toward goals and need for more therapy. Patient demonstrated understanding of education, evaluation and plan by verbally repeating back plan, requesting more therapy to be instructed in WB status and safety. HOME EXERCISE PROGRAM: Seated:  AP, LAQ w/ 5 sec hold, Hip abduction and knee raises both with red theraband, all x 20 reps, 2-3 times a day. Get up, walk, every briana to prevent being sedantary. PLAN:  CONTINUED NEED for the following skills: Physical Therapy 1w1, 2w3  Pt/Caregiver instructed on plan of care and are agreeable to plan of care at this time. DISCHARGE PLANNING discussed with patient and caregiver.   Discharge planning as follows: Discharge patient to self, caregiver and physician when goals are met or maximum benefit is achieved. Patient did verbalize understanding.

## 2021-12-18 ENCOUNTER — HOME CARE VISIT (OUTPATIENT)
Dept: HOME HEALTH SERVICES | Facility: HOME HEALTH | Age: 76
End: 2021-12-18
Payer: MEDICARE

## 2021-12-21 ENCOUNTER — HOME CARE VISIT (OUTPATIENT)
Dept: SCHEDULING | Facility: HOME HEALTH | Age: 76
End: 2021-12-21
Payer: MEDICARE

## 2021-12-21 PROCEDURE — G0157 HHC PT ASSISTANT EA 15: HCPCS

## 2021-12-21 PROCEDURE — G0158 HHC OT ASSISTANT EA 15: HCPCS

## 2021-12-21 PROCEDURE — 400014 HH F/U

## 2021-12-21 NOTE — HOME HEALTH
DOCTOR'S VISIT:  1/5/2022 9:00 AM Rosa Horn PA-C [facility] - [facility]     SUBJECTIVE:  Pt reports that the REZA was here earlier; she took her up and down the staris this morning. NARRATIVE:  Pt ed on pelvic stability, pelvic & core strength for gait stability & balance, and how standing single leg on the LLE will put pressure on the pubic rami FX so there will be no single leg ther ex. Instructed in, gave handout for, and pt performed Standing HEP for BLE x 10ea: quarter squats and HR/TR w MC/VC for correct form, lifting pelvic floor, and upright posture w TA draw. Instructed in, gave handout for, and pt performed sitting ther ex for HEP for CHRIS LEs x 10 ea:  LAQ w 5 sec hold, HS curls, Marches, Hip ADD/ABD, HR/TR, and unsupported sit w MC/VC for upright posture w thoracic ext wo chin or shoulder girdle lift x 2 min. Instructed in, gave handout for, and pt performed static balance w eyes open looking at floor w feet 4, 2, and 1 inches apart & together and CHRIS 1/4 stride w arms by sides x 30 sec ea. Pt reports that neither pain or fatigue increased. Pt sat in recliner wo eccentric control. Pt sit<>stand x 3 w the first wo eccentric control and x 2 w careful sit. Pt educated on the need to sit carefully as she is landing hard on her pelvic rami FX and that is not good for them. Pt verbalized that she would. CAREGIVER INVOLVEMENT/ASSISTANCE NEEDED FOR:  Pt's son and daughter in law assists w ADLs, medications, meals, and transportation. HOME HEALTH SUPPLIES by type and quantity ordered/delivered this visit include: None      ASSESSMENT AND PROGRESS TOWARD GOALS:    Progressing well toward goals for strength, balance, and transfers w performance of 2 standing BLE strength ex and 6 static balance ex x 30 sec each w Mod increase w postural sway at BLE 1/4 stride w good control of eccentric sitting after educated by LPTA.     Patient continues to have deficits in strength, balance, gait, stairs, and transfers due to multiple pelvic fx. Patient will benefit from continued intervention with progression of all PT goals to improve functional independence, prevent falls, decrease burden of care, and improve quality of life. CONTINUED NEED FOR THE FOLLOWING SKILLS:  HH PT is medically necessary to  address pain, decreased ROM of LLE, decreased strength, decreased independence with functional transfers, impaired gait, impaired stair negotiation, and impaired balance in order to improve functional independence, quality of life, return to PLOF, and reduce the risk for falls. PLAN FOR NEXT VISIT:  Static and dynamic balance    DISCHARGE PLANNING was discussed with the pt/caregiver:   Frequency: 1wk1, 2wk3, w 1wk1, 2wk2 remaining with anticipated DC on 1/7.

## 2021-12-22 VITALS
OXYGEN SATURATION: 98 % | RESPIRATION RATE: 14 BRPM | HEART RATE: 87 BPM | TEMPERATURE: 98 F | DIASTOLIC BLOOD PRESSURE: 78 MMHG | SYSTOLIC BLOOD PRESSURE: 132 MMHG

## 2021-12-22 VITALS
HEART RATE: 79 BPM | DIASTOLIC BLOOD PRESSURE: 60 MMHG | OXYGEN SATURATION: 98 % | TEMPERATURE: 97.2 F | SYSTOLIC BLOOD PRESSURE: 114 MMHG

## 2021-12-22 NOTE — HOME HEALTH
Subjective:Patient stated she is doing better with getting up and down the stair on her own with more confidence thanks to rehab staff. .         Medications reviewed and all medications Are available in the home this visit. .           The following education was provided regarding medications, medication interactions, and look alike medications (specify): Patient instrusted to report refill medication to MD office  as soon as possbile to prevent increased pain. .         Medications  are EFFECTIVE at this time. .                    Home health supplies by type and quantity ordered/delivered this visit include: N/A         . Patient education provided this visit: REZA Provided eduation on the importance of installing an rail to ascend and descend on stairway, education provided on tub bench and the importance of using an tub bench, and  education provided on grab bars and the importance to prevent falls. .                   Patient level of understanding of education provided: Patient is agreeance to education provided from [address], pt stated she will discuss with her daughter in law the education provided from [address] Ruslan Aguero Patient response to treatment: Pt all functional transfers with CGA with use of proper body mechians and trunk control. Ruslan Aguero CAREGIVER INVOLVEMENT/ASSISTANCE NEEDED FOR: Patients daughter and son help assit with I/ADLS. Ruslan Aguero HOME HEALTH SUPPLIES BY TYPE AND QUANTITY ORDERED/DELIVERED THIS VISIT INCLUDE: N/A          . OBJECTIVE:  See interventions. .                   ASSESSMENT OF PROGRESS TOWARD GOALS: Patient is progressing on OT POC addressing functional mobilty and teaching for modifications for the home. Pt was able to perform all functional mobilty tasks with MOD I. Pt was able to demo asecending and descending on stairs with CGA. Education provided on home modifications(Please see education section). CONTINUED NEED FOR THE FOLLOWING SKILLS: HH OT is medically necessary to address barriers of  decreased balance, decreased activity tolerance, generalized weakness and safety awareness. These barriers limit pt's participation in ADLs and functional mobility safely and would benefit from continued skilled OT to maximize independence and reduce burden on caregiver. Alexsander Nelson PLAN FOR NEXT VISIT: Pablito Ortez will address ADL retraining tasks. .                                       THE FOLLOWING DISCHARGE PLANNING WAS DISCUSSED WITH THE PATIENT/CAREGIVER:  The following discharge planning was discussed with the pt/caregiver: 1w4 Pt agreeable to work on tub transfers, bathing, functional mobility, balance, and safety education in the home.

## 2021-12-23 ENCOUNTER — HOME CARE VISIT (OUTPATIENT)
Dept: SCHEDULING | Facility: HOME HEALTH | Age: 76
End: 2021-12-23
Payer: MEDICARE

## 2021-12-23 PROCEDURE — G0157 HHC PT ASSISTANT EA 15: HCPCS

## 2021-12-23 NOTE — HOME HEALTH
DOCTOR'S VISIT:  1/5/2022 9:00 AM Marilu Horn PA-C [facility]     SUBJECTIVE:  Pt reports that her back hurt since she got out of bed this morning, but she thinks that she might have irritated her back moving the [facility] at HD. Pt reports that she can feel her pelvic fxs today and her LLE. Pt reports that she uses the [facility] for going to dialysis. NARRATIVE:  Pt asked to amb outdoors today. Instructed pt again in lateral step to for 3 front steps and front doorsteps, and amb down & up 3 steps w close SBA wo pain increase or LOB. There was a larger than normal 10 inch single step at the end of the walkway that pt was able to negotiate but w close SBA w RW for support. Pt amb outdoors w RW w close SBA >200 feet w slow recipricol gait along w down and up sloped driveway, on paved, graded road, and along uneven sidewalk w tree debris w SPO2 99% and HR98 BPM.  Pt ed on RPE and pt reports that her body feels tired and that pain from back and pelvis did not increase. Therapist stated that she thought that pt could amb to HD w RW, but that she might need WC coming home if weak. Pt verbalized agreement. CAREGIVER INVOLVEMENT/ASSISTANCE NEEDED FOR:  Pt's son and daughter in law assists w ADLs, medications, meals, and transportation. HOME HEALTH SUPPLIES by type and quantity ordered/delivered this visit include: None    ASSESSMENT AND PROGRESS TOWARD GOALS:    Progressing well toward goals for strength, balance, and stairs w amb down and up doorway, 3 steps w lateral step-to w 1 handrail, and down and up 10inch step on walkway, and amb outdoors on verious uneven surfaces >200 feet w RW w SBA wo SOB or LOB. Patient continues to have deficits in strength, balance, gait, stairs, and transfers due to multiple pelvic fx.     Patient will benefit from continued intervention with progression of all PT goals to improve functional independence, prevent falls, decrease burden of care, and improve quality of life. CONTINUED NEED FOR THE FOLLOWING SKILLS:  HH PT is medically necessary to  address pain, decreased ROM of LLE, decreased strength, decreased independence with functional transfers, impaired gait, impaired stair negotiation, and impaired balance in order to improve functional independence, quality of life, return to PLOF, and reduce the risk for falls. PLAN FOR NEXT VISIT:  Static and dynamic balance    DISCHARGE PLANNING was discussed with the pt/caregiver:   Frequency: 1wk1, 2wk3, w 2wk2 remaining with anticipated DC on 1/7.

## 2021-12-26 VITALS
HEART RATE: 81 BPM | TEMPERATURE: 97.2 F | DIASTOLIC BLOOD PRESSURE: 64 MMHG | SYSTOLIC BLOOD PRESSURE: 107 MMHG | OXYGEN SATURATION: 98 %

## 2021-12-28 ENCOUNTER — HOME CARE VISIT (OUTPATIENT)
Dept: HOME HEALTH SERVICES | Facility: HOME HEALTH | Age: 76
End: 2021-12-28
Payer: MEDICARE

## 2021-12-28 ENCOUNTER — HOME CARE VISIT (OUTPATIENT)
Dept: SCHEDULING | Facility: HOME HEALTH | Age: 76
End: 2021-12-28
Payer: MEDICARE

## 2021-12-28 PROCEDURE — G0157 HHC PT ASSISTANT EA 15: HCPCS

## 2021-12-28 NOTE — HOME HEALTH
DOCTOR'S VISIT:  1/5/2022 9:00 AM Angel Horn PA-C [facility] - [facility]     SUBJECTIVE:  Pt reports that her back is terrible today, that two nights ago her back went to 9/10, and that she uses heat to calm down the back pain. \"I know I hurt my back last week at dialysis. \"  Pt informed therapist that her son is leaving soon to sell her house in [place], that she will have to get to dialysis 3 times a week wo help as her daughter in law has stated that she won't help her, and that she is scared that she will fall again on the steps on the walkway. \"My son takes me and the dogs for a walk every evening now, but he helps me (w the 3 front steps w handrail & the 10 inch step at the end of the walkway wo a handrail). \"    NARRATIVE:  Arrived to find pt WC amb to open door for therapist due to c/o sig LBP. Instructed in, gave handout for, and pt performed for dynamic balance and hip girdle strengthening sidestepping w BUE support w MC/VC to hips straight and BLE turned in for glut med recruitment and slow, low march x 10. Instructed in, gave handout for, and pt performed static balance w eyes open looking straight ahead w feet 6, 4, 2, and 1 inches apart, together, and CHRIS 1/4 stride w arms side x 30sec. Instructed in and gave handout for WS progression for dynamic balance: in stance A/P & R/L x 10 ea; in CHRIS 1/4 and 1/2 stride x 10 ea w MC/VC for shifting weight over stance leg; and stepping w 1 UE support w step-to & step through w LLE as stance leg w freq VC/MC to correct for [facility] pesteriorly w lumbar HE. Pt c/o new pain on either side of her tailbone. Pt educated on the pelvis, where she had the fx's, and the sacrotuberous ligament w use on anatomy yaritza for visual aide in education, and educated on anatomy of pelvic floor, kegels ex w TA draw for increased trunk strength and stability. Pt reports no pain increased w standing ther ex in back or pelvis.       CAREGIVER INVOLVEMENT/ASSISTANCE NEEDED FOR:  Pt's son and daughter in law assists w ADLs, medications, meals, and transportation. HOME HEALTH SUPPLIES by type and quantity ordered/delivered this visit include: None    ASSESSMENT AND PROGRESS TOWARD GOALS:    Progressing well toward goals for strength and balance w progressing to challenging dynamic balance ex w WSing w 1 UE support to 1/2 stride from even stance w 1 UE support and sidestepping, and was able to perform static balance ex to 1/4 stride w min increase in postural sway. Patient continues to have deficits in strength, balance, gait, stairs, and transfers due to multiple pelvic fx. Patient will benefit from continued intervention with progression of all PT goals to improve functional independence, prevent falls, decrease burden of care, and improve quality of life. CONTINUED NEED FOR THE FOLLOWING SKILLS:  HH PT is medically necessary to  address pain, decreased ROM of LLE, decreased strength, decreased independence with functional transfers, impaired gait, impaired stair negotiation, and impaired balance in order to improve functional independence, quality of life, return to PLOF, and reduce the risk for falls. PLAN FOR NEXT VISIT:  Cont w static and dynamic balance for gait stability. Stairs strategies for when she is alone    DISCHARGE PLANNING was discussed with the pt/caregiver:   Frequency: 1wk1, 2wk3, w 1wk1, 2wk1 remaining with anticipated DC on 1/7.

## 2021-12-29 VITALS
SYSTOLIC BLOOD PRESSURE: 97 MMHG | DIASTOLIC BLOOD PRESSURE: 60 MMHG | OXYGEN SATURATION: 98 % | HEART RATE: 85 BPM | TEMPERATURE: 97.7 F

## 2021-12-30 ENCOUNTER — HOME CARE VISIT (OUTPATIENT)
Dept: SCHEDULING | Facility: HOME HEALTH | Age: 76
End: 2021-12-30
Payer: MEDICARE

## 2021-12-30 VITALS
HEART RATE: 82 BPM | TEMPERATURE: 99.1 F | DIASTOLIC BLOOD PRESSURE: 62 MMHG | OXYGEN SATURATION: 97 % | SYSTOLIC BLOOD PRESSURE: 119 MMHG

## 2021-12-30 PROCEDURE — G0157 HHC PT ASSISTANT EA 15: HCPCS

## 2021-12-30 PROCEDURE — G0152 HHCP-SERV OF OT,EA 15 MIN: HCPCS

## 2021-12-30 NOTE — HOME HEALTH
DOCTOR'S VISIT:  1/5/2022 9:00 AM Aracely Horn PA-C [facility]      SUBJECTIVE:  Pt reports that if she sits down gently her pelvis doesn't hurt. Pt reported feeling unhappy about the poor relationship w her daughter in law who is very cold towards her, depressed bc she has not controll over her life any more, and that she just wants to go back to her house in [place] before it is sold to get her things as everyone is either throwing her stuff away or just giving it away. \"I just want to get to HD by myself w the walker. \"    NARRATIVE:  Arrived w pt in [facility]. Pt amb w antalgic gait w trunk shift to right to & from kitchen for ther ex w RW x 16 feet x 2 w SBA. Pt educated on the muscle of the core are all abdominals that wrap arounf the entire trunk & need TA draw and the muscles of the hips, and that ther ex will include both today. Instructed pt in [address] [place] [address] progression for dynamic balance and pre-cane training: W 1 UE support, WSing stance A<>P and R<>L x 10ea; in CHRIS 1/4, 1/2 stride x 10ea w cont manual guidance w pt's hands on therapist's shoulders and therapist's hands on pt's hips w WSing together and w MC/VC to move hips and shoulders together over the correct aspect of stance LE wo trunk compensations or lumbar HE w TA draw wo elevating shoulders. Instructed in and gave handout for HEP for dynamic balance and hip girdle strengthening:  sidestepping along 6 feet of counter x 1 laps w 2 UE support w MC/VC for correct form; and and retro/tandem walking along 6 feet of counter x 3 laps w 1 UE support w 2 laps w heel beside toe and 1 lap w full heel to toe. Pt c/o back pain increasing from 0/10 to 6/10 and back to 0/10 w sitting. CAREGIVER INVOLVEMENT/ASSISTANCE NEEDED FOR:  Pt's son and daughter in law assists w ADLs, medications, meals, and transportation.     HOME HEALTH SUPPLIES by type and quantity ordered/delivered this visit include: None    ASSESSMENT AND PROGRESS TOWARD GOALS: Progressing well toward goals for strength and balance w progressing to challenging dynamic balance ex WSing progression from stance to CHRIS 1/2 stride and progressed to retro & tandem walking along counter for narrowed TASHIA gait. Patient continues to have deficits in strength, balance, gait, stairs, and transfers due to multiple pelvic fx. Patient will benefit from continued intervention with progression of all PT goals to improve functional independence, prevent falls, decrease burden of care, and improve quality of life. CONTINUED NEED FOR THE FOLLOWING SKILLS:  HH PT is medically necessary to  address pain, decreased ROM of LLE, decreased strength, decreased independence with functional transfers, impaired gait, impaired stair negotiation, and impaired balance in order to improve functional independence, quality of life, return to PLOF, and reduce the risk for falls. PLAN FOR NEXT VISIT:  Stairs strategies for when she to go to dialysis alone    DISCHARGE PLANNING was discussed with the pt/caregiver:   Frequency: 1wk1, 2wk3, w 2wk1 remaining with anticipated DC on 1/7.

## 2021-12-30 NOTE — HOME HEALTH
Caregiver involvement: Pt lives with her son and daughter in law. They are helping with IADLs like shopping and cooking. \    Medications reconciled and all medications are available in the home this visit. The following education was provided regarding medications: Continue as directed by MD.  Medications  are effective at this time. Patient education provided this visit: Pt educated on avoiding to hand items like therabands and gait belts off front of walker due to fall hazard. Pt education on and provided ith visual demonstration on 75% left LE WB precaution. Patient level of understanding of education provided: Pt receptive to education provided and able to teach back several different fall hazards in the home (pets, rugs, thresholds, clutter). Pt questioning throughout session activities if she was maintaining precaution correctly. Patient response to treatment:  Pt is presenting with increased saftey awarness when completing house hold distances. She continues to require cues for saftey with tub transfers. She has activly made efforts to reduce fall hazards and clutter throughout her walk ways. Skilled Care Provided: Pt completed full occupational therapy treatment to include balance,ADL education/training, functional mobility and home safety. Progress toward goals: Mrs. Brittany Bello presents is making great progress towards her goals and is on tack to discharge next week. See interventions section for details. Home exercise program: HAve son bring back tub transfer bench from her home. Clear out bed side commode from bedroom walk way since she is not using it and it is covered in clothing items. Continued need for the following skills: Physical Therapy and Occupational Therapy    The following discharge planning was discussed with the pt/caregiver: Follow up for discharge next week from skilled occupational therapy with pt to discharge to self. Pt to clear out bedroom of more clutter.
Never smoker

## 2021-12-31 VITALS
SYSTOLIC BLOOD PRESSURE: 101 MMHG | OXYGEN SATURATION: 98 % | DIASTOLIC BLOOD PRESSURE: 60 MMHG | TEMPERATURE: 97.7 F | HEART RATE: 82 BPM

## 2022-01-01 ENCOUNTER — HOSPICE ADMISSION (OUTPATIENT)
Dept: HOSPICE | Facility: HOSPICE | Age: 77
End: 2022-01-01
Payer: MEDICARE

## 2022-01-01 ENCOUNTER — HOME CARE VISIT (OUTPATIENT)
Dept: HOSPICE | Facility: HOSPICE | Age: 77
End: 2022-01-01
Payer: MEDICARE

## 2022-01-01 ENCOUNTER — ANESTHESIA (OUTPATIENT)
Dept: SURGERY | Age: 77
End: 2022-01-01
Payer: MEDICARE

## 2022-01-01 ENCOUNTER — HOSPITAL ENCOUNTER (OUTPATIENT)
Age: 77
Discharge: HOME | End: 2022-11-08
Attending: UROLOGY | Admitting: UROLOGY
Payer: MEDICARE

## 2022-01-01 ENCOUNTER — APPOINTMENT (OUTPATIENT)
Dept: GENERAL RADIOLOGY | Age: 77
End: 2022-01-01
Attending: UROLOGY
Payer: MEDICARE

## 2022-01-01 ENCOUNTER — HOME CARE VISIT (OUTPATIENT)
Dept: SCHEDULING | Facility: HOME HEALTH | Age: 77
End: 2022-01-01
Payer: MEDICARE

## 2022-01-01 ENCOUNTER — APPOINTMENT (OUTPATIENT)
Dept: CT IMAGING | Age: 77
DRG: 871 | End: 2022-01-01
Attending: STUDENT IN AN ORGANIZED HEALTH CARE EDUCATION/TRAINING PROGRAM
Payer: MEDICARE

## 2022-01-01 ENCOUNTER — ANESTHESIA EVENT (OUTPATIENT)
Dept: SURGERY | Age: 77
End: 2022-01-01
Payer: MEDICARE

## 2022-01-01 ENCOUNTER — HOSPITAL ENCOUNTER (INPATIENT)
Age: 77
LOS: 8 days | Discharge: HOSPICE | DRG: 871 | End: 2022-11-22
Attending: STUDENT IN AN ORGANIZED HEALTH CARE EDUCATION/TRAINING PROGRAM | Admitting: INTERNAL MEDICINE
Payer: MEDICARE

## 2022-01-01 ENCOUNTER — APPOINTMENT (OUTPATIENT)
Dept: GENERAL RADIOLOGY | Age: 77
DRG: 871 | End: 2022-01-01
Payer: MEDICARE

## 2022-01-01 VITALS
RESPIRATION RATE: 24 BRPM | SYSTOLIC BLOOD PRESSURE: 96 MMHG | TEMPERATURE: 97.5 F | DIASTOLIC BLOOD PRESSURE: 49 MMHG | HEART RATE: 54 BPM | OXYGEN SATURATION: 96 %

## 2022-01-01 VITALS
WEIGHT: 202 LBS | TEMPERATURE: 97.7 F | SYSTOLIC BLOOD PRESSURE: 95 MMHG | RESPIRATION RATE: 16 BRPM | OXYGEN SATURATION: 99 % | HEIGHT: 62 IN | HEART RATE: 74 BPM | BODY MASS INDEX: 37.17 KG/M2 | DIASTOLIC BLOOD PRESSURE: 23 MMHG

## 2022-01-01 VITALS
SYSTOLIC BLOOD PRESSURE: 114 MMHG | BODY MASS INDEX: 34.48 KG/M2 | WEIGHT: 187.39 LBS | TEMPERATURE: 98 F | RESPIRATION RATE: 16 BRPM | HEART RATE: 90 BPM | OXYGEN SATURATION: 97 % | DIASTOLIC BLOOD PRESSURE: 65 MMHG | HEIGHT: 62 IN

## 2022-01-01 VITALS
OXYGEN SATURATION: 97 % | RESPIRATION RATE: 20 BRPM | TEMPERATURE: 97.8 F | DIASTOLIC BLOOD PRESSURE: 62 MMHG | HEART RATE: 78 BPM | SYSTOLIC BLOOD PRESSURE: 128 MMHG

## 2022-01-01 DIAGNOSIS — R00.1 SYMPTOMATIC BRADYCARDIA: Primary | ICD-10-CM

## 2022-01-01 DIAGNOSIS — Z95.818 PRESENCE OF WATCHMAN LEFT ATRIAL APPENDAGE CLOSURE DEVICE: ICD-10-CM

## 2022-01-01 DIAGNOSIS — I95.2 HYPOTENSION DUE TO DRUGS: ICD-10-CM

## 2022-01-01 DIAGNOSIS — N18.6 ESRD (END STAGE RENAL DISEASE) ON DIALYSIS (HCC): ICD-10-CM

## 2022-01-01 DIAGNOSIS — N18.6 END-STAGE RENAL DISEASE ON HEMODIALYSIS (HCC): Chronic | ICD-10-CM

## 2022-01-01 DIAGNOSIS — Z99.2 ESRD (END STAGE RENAL DISEASE) ON DIALYSIS (HCC): ICD-10-CM

## 2022-01-01 DIAGNOSIS — I95.89 CHRONIC HYPOTENSION: Chronic | ICD-10-CM

## 2022-01-01 DIAGNOSIS — R00.1 BRADYCARDIA: ICD-10-CM

## 2022-01-01 DIAGNOSIS — I95.9 HYPOTENSION, UNSPECIFIED HYPOTENSION TYPE: ICD-10-CM

## 2022-01-01 DIAGNOSIS — Z99.2 END-STAGE RENAL DISEASE ON HEMODIALYSIS (HCC): Chronic | ICD-10-CM

## 2022-01-01 DIAGNOSIS — T46.0X1S: ICD-10-CM

## 2022-01-01 DIAGNOSIS — Z99.2 ESRD ON DIALYSIS (HCC): ICD-10-CM

## 2022-01-01 DIAGNOSIS — N18.6 ESRD ON DIALYSIS (HCC): ICD-10-CM

## 2022-01-01 DIAGNOSIS — R57.9 SHOCK (HCC): ICD-10-CM

## 2022-01-01 DIAGNOSIS — R57.0 CARDIOGENIC SHOCK (HCC): ICD-10-CM

## 2022-01-01 DIAGNOSIS — G93.40 ACUTE ENCEPHALOPATHY: ICD-10-CM

## 2022-01-01 LAB
ACC. NO. FROM MICRO ORDER, ACCP: ABNORMAL
ACINETOBACTER CALCOACETICUS-BAUMANII COMPLEX, ACBCX: NOT DETECTED
ALBUMIN SERPL-MCNC: 3.3 G/DL (ref 3.4–5)
ALBUMIN/GLOB SERPL: 0.9 {RATIO} (ref 0.8–1.7)
ALP SERPL-CCNC: 107 U/L (ref 45–117)
ALT SERPL-CCNC: 15 U/L (ref 13–56)
ANION GAP BLD CALC-SCNC: 13 MMOL/L (ref 10–20)
ANION GAP SERPL CALC-SCNC: 10 MMOL/L (ref 3–18)
ANION GAP SERPL CALC-SCNC: 10 MMOL/L (ref 3–18)
ANION GAP SERPL CALC-SCNC: 12 MMOL/L (ref 3–18)
ANION GAP SERPL CALC-SCNC: 12 MMOL/L (ref 3–18)
ANION GAP SERPL CALC-SCNC: 13 MMOL/L (ref 3–18)
ANION GAP SERPL CALC-SCNC: 13 MMOL/L (ref 3–18)
ANION GAP SERPL CALC-SCNC: 8 MMOL/L (ref 3–18)
ANION GAP SERPL CALC-SCNC: 9 MMOL/L (ref 3–18)
ANION GAP SERPL CALC-SCNC: 9 MMOL/L (ref 3–18)
APPEARANCE UR: ABNORMAL
AST SERPL-CCNC: 11 U/L (ref 10–38)
ATRIAL RATE: 64 BPM
ATRIAL RATE: 71 BPM
ATRIAL RATE: 85 BPM
B PERT DNA SPEC QL NAA+PROBE: NOT DETECTED
BACTERIA SPEC CULT: ABNORMAL
BACTERIA SPEC CULT: NORMAL
BACTERIA URNS QL MICRO: ABNORMAL /HPF
BACTEROIDES FRAGILIS, BFRA: NOT DETECTED
BASE DEFICIT BLD-SCNC: 10.4 MMOL/L
BASOPHILS # BLD: 0.1 K/UL (ref 0–0.1)
BASOPHILS # BLD: 0.2 K/UL (ref 0–0.1)
BASOPHILS NFR BLD: 1 % (ref 0–2)
BILIRUB SERPL-MCNC: 0.6 MG/DL (ref 0.2–1)
BILIRUB UR QL: ABNORMAL
BIOFIRE COMMENT, BCIDPF: ABNORMAL
BNP SERPL-MCNC: 3393 PG/ML (ref 0–1800)
BORDETELLA PARAPERTUSSIS PCR, BORPAR: NOT DETECTED
BUN SERPL-MCNC: 15 MG/DL (ref 7–18)
BUN SERPL-MCNC: 17 MG/DL (ref 7–18)
BUN SERPL-MCNC: 20 MG/DL (ref 7–18)
BUN SERPL-MCNC: 23 MG/DL (ref 7–18)
BUN SERPL-MCNC: 27 MG/DL (ref 7–18)
BUN SERPL-MCNC: 32 MG/DL (ref 7–18)
BUN SERPL-MCNC: 42 MG/DL (ref 7–18)
BUN SERPL-MCNC: 45 MG/DL (ref 7–18)
BUN SERPL-MCNC: 52 MG/DL (ref 7–18)
BUN/CREAT SERPL: 3 (ref 12–20)
BUN/CREAT SERPL: 4 (ref 12–20)
C GLABRATA DNA VAG QL NAA+PROBE: NOT DETECTED
C PNEUM DNA SPEC QL NAA+PROBE: NOT DETECTED
CA-I BLD-MCNC: 1.11 MMOL/L (ref 1.12–1.32)
CA-I BLD-SCNC: 1.09 MMOL/L (ref 1.12–1.32)
CA-I SERPL-SCNC: 1.12 MMOL/L (ref 1.15–1.33)
CALCIUM SERPL-MCNC: 8.3 MG/DL (ref 8.5–10.1)
CALCIUM SERPL-MCNC: 8.3 MG/DL (ref 8.5–10.1)
CALCIUM SERPL-MCNC: 8.4 MG/DL (ref 8.5–10.1)
CALCIUM SERPL-MCNC: 8.5 MG/DL (ref 8.5–10.1)
CALCIUM SERPL-MCNC: 8.5 MG/DL (ref 8.5–10.1)
CALCIUM SERPL-MCNC: 8.6 MG/DL (ref 8.5–10.1)
CALCIUM SERPL-MCNC: 8.7 MG/DL (ref 8.5–10.1)
CALCIUM SERPL-MCNC: 8.7 MG/DL (ref 8.5–10.1)
CALCIUM SERPL-MCNC: 9.1 MG/DL (ref 8.5–10.1)
CALCULATED R AXIS, ECG10: -22 DEGREES
CALCULATED R AXIS, ECG10: -24 DEGREES
CALCULATED R AXIS, ECG10: -25 DEGREES
CALCULATED R AXIS, ECG10: -27 DEGREES
CALCULATED R AXIS, ECG10: -27 DEGREES
CALCULATED T AXIS, ECG11: -135 DEGREES
CALCULATED T AXIS, ECG11: -173 DEGREES
CALCULATED T AXIS, ECG11: -175 DEGREES
CALCULATED T AXIS, ECG11: 80 DEGREES
CALCULATED T AXIS, ECG11: 87 DEGREES
CANDIDA ALBICANS: NOT DETECTED
CANDIDA AURIS, CAAU: NOT DETECTED
CANDIDA KRUSEI, CKRP: NOT DETECTED
CANDIDA PARAPSILOSIS, CPAUP: NOT DETECTED
CANDIDA TROPICALIS, CTROP: NOT DETECTED
CHLORIDE BLD-SCNC: 99 MMOL/L (ref 98–107)
CHLORIDE SERPL-SCNC: 100 MMOL/L (ref 100–111)
CHLORIDE SERPL-SCNC: 101 MMOL/L (ref 100–111)
CHLORIDE SERPL-SCNC: 101 MMOL/L (ref 100–111)
CHLORIDE SERPL-SCNC: 102 MMOL/L (ref 100–111)
CHLORIDE SERPL-SCNC: 96 MMOL/L (ref 100–111)
CHLORIDE SERPL-SCNC: 97 MMOL/L (ref 100–111)
CHLORIDE SERPL-SCNC: 98 MMOL/L (ref 100–111)
CO2 BLD-SCNC: 15 MMOL/L (ref 19–24)
CO2 SERPL-SCNC: 18 MMOL/L (ref 21–32)
CO2 SERPL-SCNC: 19 MMOL/L (ref 21–32)
CO2 SERPL-SCNC: 19 MMOL/L (ref 21–32)
CO2 SERPL-SCNC: 20 MMOL/L (ref 21–32)
CO2 SERPL-SCNC: 23 MMOL/L (ref 21–32)
CO2 SERPL-SCNC: 26 MMOL/L (ref 21–32)
CO2 SERPL-SCNC: 27 MMOL/L (ref 21–32)
CO2 SERPL-SCNC: 27 MMOL/L (ref 21–32)
CO2 SERPL-SCNC: 28 MMOL/L (ref 21–32)
COLOR UR: YELLOW
CREAT BLD-MCNC: 10.77 MG/DL (ref 0.6–1.3)
CREAT SERPL-MCNC: 11 MG/DL (ref 0.6–1.3)
CREAT SERPL-MCNC: 11.5 MG/DL (ref 0.6–1.3)
CREAT SERPL-MCNC: 12.5 MG/DL (ref 0.6–1.3)
CREAT SERPL-MCNC: 5.42 MG/DL (ref 0.6–1.3)
CREAT SERPL-MCNC: 5.79 MG/DL (ref 0.6–1.3)
CREAT SERPL-MCNC: 6.22 MG/DL (ref 0.6–1.3)
CREAT SERPL-MCNC: 7.47 MG/DL (ref 0.6–1.3)
CREAT SERPL-MCNC: 8.52 MG/DL (ref 0.6–1.3)
CREAT SERPL-MCNC: 9.62 MG/DL (ref 0.6–1.3)
CRYPTO NEOFORMANS/GATTII, CRYNEG: NOT DETECTED
DIAGNOSIS, 93000: NORMAL
DIFFERENTIAL METHOD BLD: ABNORMAL
DIGOXIN SERPL-MCNC: 1.6 NG/ML (ref 0.9–2)
ENTEROBACTER CLOACAE COMPLEX, ECCP: NOT DETECTED
ENTEROBACTERALES SP. , ENBLS: NOT DETECTED
ENTEROCOCCUS FAECALIS, ENFA: NOT DETECTED
ENTEROCOCCUS FAECIUM, ENFAM: NOT DETECTED
EOSINOPHIL # BLD: 0.1 K/UL (ref 0–0.4)
EOSINOPHIL # BLD: 0.2 K/UL (ref 0–0.4)
EOSINOPHIL # BLD: 0.3 K/UL (ref 0–0.4)
EOSINOPHIL # BLD: 0.3 K/UL (ref 0–0.4)
EOSINOPHIL NFR BLD: 1 % (ref 0–5)
EOSINOPHIL NFR BLD: 2 % (ref 0–5)
EOSINOPHIL NFR BLD: 2 % (ref 0–5)
EOSINOPHIL NFR BLD: 3 % (ref 0–5)
EOSINOPHIL NFR BLD: 3 % (ref 0–5)
EOSINOPHIL NFR BLD: 4 % (ref 0–5)
EPITH CASTS URNS QL MICRO: ABNORMAL /LPF (ref 0–5)
ERYTHROCYTE [DISTWIDTH] IN BLOOD BY AUTOMATED COUNT: 14.7 % (ref 11.6–14.5)
ERYTHROCYTE [DISTWIDTH] IN BLOOD BY AUTOMATED COUNT: 14.7 % (ref 11.6–14.5)
ERYTHROCYTE [DISTWIDTH] IN BLOOD BY AUTOMATED COUNT: 15.3 % (ref 11.6–14.5)
ERYTHROCYTE [DISTWIDTH] IN BLOOD BY AUTOMATED COUNT: 15.6 % (ref 11.6–14.5)
ERYTHROCYTE [DISTWIDTH] IN BLOOD BY AUTOMATED COUNT: 15.9 % (ref 11.6–14.5)
ERYTHROCYTE [DISTWIDTH] IN BLOOD BY AUTOMATED COUNT: 16 % (ref 11.6–14.5)
ERYTHROCYTE [DISTWIDTH] IN BLOOD BY AUTOMATED COUNT: 16.1 % (ref 11.6–14.5)
ERYTHROCYTE [DISTWIDTH] IN BLOOD BY AUTOMATED COUNT: 16.3 % (ref 11.6–14.5)
ESCHERICHIA COLI: NOT DETECTED
EST. AVERAGE GLUCOSE BLD GHB EST-MCNC: ABNORMAL MG/DL
FLUAV H1 2009 PAND RNA SPEC QL NAA+PROBE: NOT DETECTED
FLUAV H1 RNA SPEC QL NAA+PROBE: NOT DETECTED
FLUAV H3 RNA SPEC QL NAA+PROBE: NOT DETECTED
FLUAV SUBTYP SPEC NAA+PROBE: NOT DETECTED
FLUBV RNA SPEC QL NAA+PROBE: NOT DETECTED
GLOBULIN SER CALC-MCNC: 3.5 G/DL (ref 2–4)
GLUCOSE BLD STRIP.AUTO-MCNC: 100 MG/DL (ref 70–110)
GLUCOSE BLD STRIP.AUTO-MCNC: 105 MG/DL (ref 70–110)
GLUCOSE BLD STRIP.AUTO-MCNC: 105 MG/DL (ref 70–110)
GLUCOSE BLD STRIP.AUTO-MCNC: 108 MG/DL (ref 70–110)
GLUCOSE BLD STRIP.AUTO-MCNC: 113 MG/DL (ref 70–110)
GLUCOSE BLD STRIP.AUTO-MCNC: 114 MG/DL (ref 70–110)
GLUCOSE BLD STRIP.AUTO-MCNC: 116 MG/DL (ref 70–110)
GLUCOSE BLD STRIP.AUTO-MCNC: 119 MG/DL (ref 70–110)
GLUCOSE BLD STRIP.AUTO-MCNC: 121 MG/DL (ref 70–110)
GLUCOSE BLD STRIP.AUTO-MCNC: 122 MG/DL (ref 70–110)
GLUCOSE BLD STRIP.AUTO-MCNC: 126 MG/DL (ref 70–110)
GLUCOSE BLD STRIP.AUTO-MCNC: 129 MG/DL (ref 70–110)
GLUCOSE BLD STRIP.AUTO-MCNC: 130 MG/DL (ref 70–110)
GLUCOSE BLD STRIP.AUTO-MCNC: 141 MG/DL (ref 70–110)
GLUCOSE BLD STRIP.AUTO-MCNC: 150 MG/DL (ref 70–110)
GLUCOSE BLD STRIP.AUTO-MCNC: 152 MG/DL (ref 70–110)
GLUCOSE BLD STRIP.AUTO-MCNC: 154 MG/DL (ref 70–110)
GLUCOSE BLD STRIP.AUTO-MCNC: 155 MG/DL (ref 70–110)
GLUCOSE BLD STRIP.AUTO-MCNC: 156 MG/DL (ref 70–110)
GLUCOSE BLD STRIP.AUTO-MCNC: 157 MG/DL (ref 70–110)
GLUCOSE BLD STRIP.AUTO-MCNC: 172 MG/DL (ref 70–110)
GLUCOSE BLD STRIP.AUTO-MCNC: 184 MG/DL (ref 70–110)
GLUCOSE BLD STRIP.AUTO-MCNC: 205 MG/DL (ref 70–110)
GLUCOSE BLD STRIP.AUTO-MCNC: 75 MG/DL (ref 70–110)
GLUCOSE BLD STRIP.AUTO-MCNC: 82 MG/DL (ref 70–110)
GLUCOSE BLD STRIP.AUTO-MCNC: 89 MG/DL (ref 70–110)
GLUCOSE BLD STRIP.AUTO-MCNC: 94 MG/DL (ref 70–110)
GLUCOSE BLD-MCNC: <20 MG/DL (ref 65–100)
GLUCOSE SERPL-MCNC: 106 MG/DL (ref 74–99)
GLUCOSE SERPL-MCNC: 109 MG/DL (ref 74–99)
GLUCOSE SERPL-MCNC: 123 MG/DL (ref 74–99)
GLUCOSE SERPL-MCNC: 147 MG/DL (ref 74–99)
GLUCOSE SERPL-MCNC: 152 MG/DL (ref 74–99)
GLUCOSE SERPL-MCNC: 170 MG/DL (ref 74–99)
GLUCOSE SERPL-MCNC: 175 MG/DL (ref 74–99)
GLUCOSE SERPL-MCNC: 87 MG/DL (ref 74–99)
GLUCOSE SERPL-MCNC: 94 MG/DL (ref 74–99)
GLUCOSE UR STRIP.AUTO-MCNC: 100 MG/DL
GRAM STN SPEC: ABNORMAL
GRAM STN SPEC: ABNORMAL
HADV DNA SPEC QL NAA+PROBE: NOT DETECTED
HAEMOPHILUS INFLUENZAE, HMI: NOT DETECTED
HBA1C MFR BLD: <3.8 % (ref 4.2–5.6)
HCO3 BLD-SCNC: 15.3 MMOL/L (ref 22–26)
HCOV 229E RNA SPEC QL NAA+PROBE: NOT DETECTED
HCOV HKU1 RNA SPEC QL NAA+PROBE: NOT DETECTED
HCOV NL63 RNA SPEC QL NAA+PROBE: NOT DETECTED
HCOV OC43 RNA SPEC QL NAA+PROBE: NOT DETECTED
HCT VFR BLD AUTO: 22.2 % (ref 35–45)
HCT VFR BLD AUTO: 22.3 % (ref 35–45)
HCT VFR BLD AUTO: 24.2 % (ref 35–45)
HCT VFR BLD AUTO: 24.3 % (ref 35–45)
HCT VFR BLD AUTO: 24.8 % (ref 35–45)
HCT VFR BLD AUTO: 26.7 % (ref 35–45)
HCT VFR BLD AUTO: 29.3 % (ref 35–45)
HCT VFR BLD AUTO: 32.3 % (ref 35–45)
HGB BLD-MCNC: 10.4 G/DL (ref 12–16)
HGB BLD-MCNC: 7.3 G/DL (ref 12–16)
HGB BLD-MCNC: 7.4 G/DL (ref 12–16)
HGB BLD-MCNC: 7.9 G/DL (ref 12–16)
HGB BLD-MCNC: 7.9 G/DL (ref 12–16)
HGB BLD-MCNC: 8 G/DL (ref 12–16)
HGB BLD-MCNC: 8.7 G/DL (ref 12–16)
HGB BLD-MCNC: 9.5 G/DL (ref 12–16)
HGB UR QL STRIP: ABNORMAL
HMPV RNA SPEC QL NAA+PROBE: NOT DETECTED
HPIV1 RNA SPEC QL NAA+PROBE: NOT DETECTED
HPIV2 RNA SPEC QL NAA+PROBE: NOT DETECTED
HPIV3 RNA SPEC QL NAA+PROBE: NOT DETECTED
HPIV4 RNA SPEC QL NAA+PROBE: NOT DETECTED
IMM GRANULOCYTES # BLD AUTO: 0.1 K/UL (ref 0–0.04)
IMM GRANULOCYTES # BLD AUTO: 0.2 K/UL (ref 0–0.04)
IMM GRANULOCYTES # BLD AUTO: 0.5 K/UL (ref 0–0.04)
IMM GRANULOCYTES NFR BLD AUTO: 1 % (ref 0–0.5)
IMM GRANULOCYTES NFR BLD AUTO: 1 % (ref 0–0.5)
IMM GRANULOCYTES NFR BLD AUTO: 2 % (ref 0–0.5)
IMM GRANULOCYTES NFR BLD AUTO: 3 % (ref 0–0.5)
IMM GRANULOCYTES NFR BLD AUTO: 3 % (ref 0–0.5)
INR PPP: 1 (ref 0.8–1.2)
KETONES UR QL STRIP.AUTO: NEGATIVE MG/DL
KLEBSIELLA AEROGENES, KLAE: NOT DETECTED
KLEBSIELLA OXYTOCA: NOT DETECTED
KLEBSIELLA PNEUMONIAE GROUP, KPPG: NOT DETECTED
LACTATE BLD-SCNC: 1.19 MMOL/L (ref 0.4–2)
LACTATE BLD-SCNC: 1.98 MMOL/L (ref 0.4–2)
LACTATE BLD-SCNC: 2.04 MMOL/L (ref 0.4–2)
LACTATE BLD-SCNC: 2.97 MMOL/L (ref 0.4–2)
LEUKOCYTE ESTERASE UR QL STRIP.AUTO: ABNORMAL
LISTERIA MONOCYTOGENES, LMONP: NOT DETECTED
LYMPHOCYTES # BLD: 0.9 K/UL (ref 0.9–3.6)
LYMPHOCYTES # BLD: 1 K/UL (ref 0.9–3.6)
LYMPHOCYTES # BLD: 1.2 K/UL (ref 0.9–3.6)
LYMPHOCYTES # BLD: 1.4 K/UL (ref 0.9–3.6)
LYMPHOCYTES # BLD: 1.8 K/UL (ref 0.9–3.6)
LYMPHOCYTES # BLD: 2 K/UL (ref 0.9–3.6)
LYMPHOCYTES # BLD: 2 K/UL (ref 0.9–3.6)
LYMPHOCYTES # BLD: 3.4 K/UL (ref 0.9–3.6)
LYMPHOCYTES NFR BLD: 10 % (ref 21–52)
LYMPHOCYTES NFR BLD: 11 % (ref 21–52)
LYMPHOCYTES NFR BLD: 12 % (ref 21–52)
LYMPHOCYTES NFR BLD: 18 % (ref 21–52)
LYMPHOCYTES NFR BLD: 21 % (ref 21–52)
LYMPHOCYTES NFR BLD: 21 % (ref 21–52)
M PNEUMO DNA SPEC QL NAA+PROBE: NOT DETECTED
MAGNESIUM SERPL-MCNC: 1.8 MG/DL (ref 1.6–2.6)
MAGNESIUM SERPL-MCNC: 1.8 MG/DL (ref 1.6–2.6)
MAGNESIUM SERPL-MCNC: 2.2 MG/DL (ref 1.6–2.6)
MAGNESIUM SERPL-MCNC: 2.4 MG/DL (ref 1.6–2.6)
MAGNESIUM SERPL-MCNC: 2.5 MG/DL (ref 1.6–2.6)
MCH RBC QN AUTO: 33.2 PG (ref 24–34)
MCH RBC QN AUTO: 33.3 PG (ref 24–34)
MCH RBC QN AUTO: 33.5 PG (ref 24–34)
MCH RBC QN AUTO: 33.6 PG (ref 24–34)
MCH RBC QN AUTO: 33.6 PG (ref 24–34)
MCH RBC QN AUTO: 33.7 PG (ref 24–34)
MCH RBC QN AUTO: 33.8 PG (ref 24–34)
MCH RBC QN AUTO: 34 PG (ref 24–34)
MCHC RBC AUTO-ENTMCNC: 32.2 G/DL (ref 31–37)
MCHC RBC AUTO-ENTMCNC: 32.3 G/DL (ref 31–37)
MCHC RBC AUTO-ENTMCNC: 32.4 G/DL (ref 31–37)
MCHC RBC AUTO-ENTMCNC: 32.5 G/DL (ref 31–37)
MCHC RBC AUTO-ENTMCNC: 32.6 G/DL (ref 31–37)
MCHC RBC AUTO-ENTMCNC: 32.6 G/DL (ref 31–37)
MCHC RBC AUTO-ENTMCNC: 32.7 G/DL (ref 31–37)
MCHC RBC AUTO-ENTMCNC: 33.3 G/DL (ref 31–37)
MCV RBC AUTO: 100.9 FL (ref 78–100)
MCV RBC AUTO: 102.1 FL (ref 78–100)
MCV RBC AUTO: 102.8 FL (ref 78–100)
MCV RBC AUTO: 103.2 FL (ref 78–100)
MCV RBC AUTO: 103.4 FL (ref 78–100)
MCV RBC AUTO: 103.5 FL (ref 78–100)
MCV RBC AUTO: 103.5 FL (ref 78–100)
MCV RBC AUTO: 105.5 FL (ref 78–100)
MECA/C (METHICILLIN RESISTANT GENE), MECACP: DETECTED
MONOCYTES # BLD: 0.9 K/UL (ref 0.05–1.2)
MONOCYTES # BLD: 1 K/UL (ref 0.05–1.2)
MONOCYTES # BLD: 1.1 K/UL (ref 0.05–1.2)
MONOCYTES # BLD: 1.1 K/UL (ref 0.05–1.2)
MONOCYTES # BLD: 1.2 K/UL (ref 0.05–1.2)
MONOCYTES # BLD: 1.3 K/UL (ref 0.05–1.2)
MONOCYTES # BLD: 1.4 K/UL (ref 0.05–1.2)
MONOCYTES # BLD: 1.6 K/UL (ref 0.05–1.2)
MONOCYTES NFR BLD: 10 % (ref 3–10)
MONOCYTES NFR BLD: 10 % (ref 3–10)
MONOCYTES NFR BLD: 11 % (ref 3–10)
MONOCYTES NFR BLD: 12 % (ref 3–10)
MONOCYTES NFR BLD: 12 % (ref 3–10)
MONOCYTES NFR BLD: 14 % (ref 3–10)
MONOCYTES NFR BLD: 15 % (ref 3–10)
MONOCYTES NFR BLD: 9 % (ref 3–10)
NEISSERIA MENINGITIDIS, NMNI: NOT DETECTED
NEUTS SEG # BLD: 10.7 K/UL (ref 1.8–8)
NEUTS SEG # BLD: 5 K/UL (ref 1.8–8)
NEUTS SEG # BLD: 5.6 K/UL (ref 1.8–8)
NEUTS SEG # BLD: 6.1 K/UL (ref 1.8–8)
NEUTS SEG # BLD: 6.9 K/UL (ref 1.8–8)
NEUTS SEG # BLD: 7.5 K/UL (ref 1.8–8)
NEUTS SEG # BLD: 7.8 K/UL (ref 1.8–8)
NEUTS SEG # BLD: 8.8 K/UL (ref 1.8–8)
NEUTS SEG NFR BLD: 63 % (ref 40–73)
NEUTS SEG NFR BLD: 63 % (ref 40–73)
NEUTS SEG NFR BLD: 65 % (ref 40–73)
NEUTS SEG NFR BLD: 67 % (ref 40–73)
NEUTS SEG NFR BLD: 67 % (ref 40–73)
NEUTS SEG NFR BLD: 69 % (ref 40–73)
NEUTS SEG NFR BLD: 76 % (ref 40–73)
NEUTS SEG NFR BLD: 77 % (ref 40–73)
NITRITE UR QL STRIP.AUTO: POSITIVE
NRBC # BLD: 0 K/UL (ref 0–0.01)
NRBC # BLD: 0.02 K/UL (ref 0–0.01)
NRBC # BLD: 0.03 K/UL (ref 0–0.01)
NRBC # BLD: 0.05 K/UL (ref 0–0.01)
NRBC # BLD: 0.08 K/UL (ref 0–0.01)
NRBC BLD-RTO: 0 PER 100 WBC
NRBC BLD-RTO: 0.2 PER 100 WBC
NRBC BLD-RTO: 0.3 PER 100 WBC
NRBC BLD-RTO: 0.5 PER 100 WBC
NRBC BLD-RTO: 0.6 PER 100 WBC
OTHER,OTHU: ABNORMAL
PCO2 BLDV: 32.4 MMHG (ref 41–51)
PH BLDV: 7.28 [PH] (ref 7.32–7.42)
PH UR STRIP: >8.5 [PH] (ref 5–8)
PHOSPHATE SERPL-MCNC: 4 MG/DL (ref 2.5–4.9)
PHOSPHATE SERPL-MCNC: 4.7 MG/DL (ref 2.5–4.9)
PHOSPHATE SERPL-MCNC: 4.9 MG/DL (ref 2.5–4.9)
PHOSPHATE SERPL-MCNC: 5.7 MG/DL (ref 2.5–4.9)
PHOSPHATE SERPL-MCNC: 6.6 MG/DL (ref 2.5–4.9)
PHOSPHATE SERPL-MCNC: 7.1 MG/DL (ref 2.5–4.9)
PHOSPHATE SERPL-MCNC: 7.4 MG/DL (ref 2.5–4.9)
PLATELET # BLD AUTO: 223 K/UL (ref 135–420)
PLATELET # BLD AUTO: 228 K/UL (ref 135–420)
PLATELET # BLD AUTO: 234 K/UL (ref 135–420)
PLATELET # BLD AUTO: 239 K/UL (ref 135–420)
PLATELET # BLD AUTO: 259 K/UL (ref 135–420)
PLATELET # BLD AUTO: 275 K/UL (ref 135–420)
PLATELET # BLD AUTO: 282 K/UL (ref 135–420)
PLATELET # BLD AUTO: 309 K/UL (ref 135–420)
PMV BLD AUTO: 10.1 FL (ref 9.2–11.8)
PMV BLD AUTO: 10.1 FL (ref 9.2–11.8)
PMV BLD AUTO: 10.2 FL (ref 9.2–11.8)
PMV BLD AUTO: 10.4 FL (ref 9.2–11.8)
PMV BLD AUTO: 9.7 FL (ref 9.2–11.8)
PMV BLD AUTO: 9.7 FL (ref 9.2–11.8)
PMV BLD AUTO: 9.9 FL (ref 9.2–11.8)
PMV BLD AUTO: 9.9 FL (ref 9.2–11.8)
PO2 BLDV: 43 MMHG (ref 25–40)
POTASSIUM BLD-SCNC: 5 MMOL/L (ref 3.5–5.1)
POTASSIUM SERPL-SCNC: 3.6 MMOL/L (ref 3.5–5.5)
POTASSIUM SERPL-SCNC: 3.7 MMOL/L (ref 3.5–5.5)
POTASSIUM SERPL-SCNC: 3.8 MMOL/L (ref 3.5–5.5)
POTASSIUM SERPL-SCNC: 3.9 MMOL/L (ref 3.5–5.5)
POTASSIUM SERPL-SCNC: 4.2 MMOL/L (ref 3.5–5.5)
POTASSIUM SERPL-SCNC: 5.1 MMOL/L (ref 3.5–5.5)
POTASSIUM SERPL-SCNC: 5.2 MMOL/L (ref 3.5–5.5)
POTASSIUM SERPL-SCNC: 5.3 MMOL/L (ref 3.5–5.5)
POTASSIUM SERPL-SCNC: 6.1 MMOL/L (ref 3.5–5.5)
PROCALCITONIN SERPL-MCNC: 0.3 NG/ML
PROCALCITONIN SERPL-MCNC: 1.92 NG/ML
PROT SERPL-MCNC: 6.8 G/DL (ref 6.4–8.2)
PROT UR STRIP-MCNC: >300 MG/DL
PROTEUS, PRP: NOT DETECTED
PROTHROMBIN TIME: 13.9 SEC (ref 11.5–15.2)
PSEUDOMONAS AERUGINOSA: NOT DETECTED
Q-T INTERVAL, ECG07: 298 MS
Q-T INTERVAL, ECG07: 404 MS
Q-T INTERVAL, ECG07: 406 MS
Q-T INTERVAL, ECG07: 410 MS
Q-T INTERVAL, ECG07: 418 MS
QRS DURATION, ECG06: 86 MS
QRS DURATION, ECG06: 94 MS
QRS DURATION, ECG06: 98 MS
QTC CALCULATION (BEZET), ECG08: 295 MS
QTC CALCULATION (BEZET), ECG08: 381 MS
QTC CALCULATION (BEZET), ECG08: 395 MS
QTC CALCULATION (BEZET), ECG08: 426 MS
QTC CALCULATION (BEZET), ECG08: 444 MS
RBC # BLD AUTO: 2.17 M/UL (ref 4.2–5.3)
RBC # BLD AUTO: 2.2 M/UL (ref 4.2–5.3)
RBC # BLD AUTO: 2.34 M/UL (ref 4.2–5.3)
RBC # BLD AUTO: 2.35 M/UL (ref 4.2–5.3)
RBC # BLD AUTO: 2.38 M/UL (ref 4.2–5.3)
RBC # BLD AUTO: 2.58 M/UL (ref 4.2–5.3)
RBC # BLD AUTO: 2.84 M/UL (ref 4.2–5.3)
RBC # BLD AUTO: 3.12 M/UL (ref 4.2–5.3)
RBC #/AREA URNS HPF: ABNORMAL /HPF (ref 0–5)
RESISTANT GENE SPACE, REGENE: ABNORMAL
RSV RNA SPEC QL NAA+PROBE: NOT DETECTED
RV+EV RNA SPEC QL NAA+PROBE: NOT DETECTED
SALMONELLA, SALMO: NOT DETECTED
SAO2 % BLD: 73 %
SARS-COV-2 PCR, COVPCR: NOT DETECTED
SERRATIA MARCESCENS: NOT DETECTED
SERVICE CMNT-IMP: 7
SERVICE CMNT-IMP: ABNORMAL
SERVICE CMNT-IMP: ABNORMAL
SERVICE CMNT-IMP: NORMAL
SODIUM BLD-SCNC: 126 MMOL/L (ref 136–145)
SODIUM SERPL-SCNC: 127 MMOL/L (ref 136–145)
SODIUM SERPL-SCNC: 128 MMOL/L (ref 136–145)
SODIUM SERPL-SCNC: 129 MMOL/L (ref 136–145)
SODIUM SERPL-SCNC: 133 MMOL/L (ref 136–145)
SODIUM SERPL-SCNC: 133 MMOL/L (ref 136–145)
SODIUM SERPL-SCNC: 136 MMOL/L (ref 136–145)
SODIUM SERPL-SCNC: 136 MMOL/L (ref 136–145)
SODIUM SERPL-SCNC: 137 MMOL/L (ref 136–145)
SODIUM SERPL-SCNC: 139 MMOL/L (ref 136–145)
SP GR UR REFRACTOMETRY: 1.02 (ref 1–1.03)
SPECIMEN SITE: ABNORMAL
STAPH EPIDERMIDIS, STEP: DETECTED
STAPH LUGDUNENSIS, STALUG: NOT DETECTED
STAPHYLOCOCCUS AUREUS: NOT DETECTED
STAPHYLOCOCCUS, STAPP: DETECTED
STENO MALTOPHILIA, STMA: NOT DETECTED
STREPTOCOCCUS , STPSP: NOT DETECTED
STREPTOCOCCUS AGALACTIAE (GROUP B): NOT DETECTED
STREPTOCOCCUS PNEUMONIAE , SPNP: NOT DETECTED
STREPTOCOCCUS PYOGENES (GROUP A), SPYOP: NOT DETECTED
TROPONIN-HIGH SENSITIVITY: 14 NG/L (ref 0–54)
TROPONIN-HIGH SENSITIVITY: 15 NG/L (ref 0–54)
TROPONIN-HIGH SENSITIVITY: 15 NG/L (ref 0–54)
TSH SERPL DL<=0.05 MIU/L-ACNC: 3.14 UIU/ML (ref 0.36–3.74)
UROBILINOGEN UR QL STRIP.AUTO: 1 EU/DL (ref 0.2–1)
VANCOMYCIN SERPL-MCNC: 13 UG/ML (ref 5–40)
VENTRICULAR RATE, ECG03: 50 BPM
VENTRICULAR RATE, ECG03: 56 BPM
VENTRICULAR RATE, ECG03: 59 BPM
VENTRICULAR RATE, ECG03: 67 BPM
VENTRICULAR RATE, ECG03: 72 BPM
WBC # BLD AUTO: 11.3 K/UL (ref 4.6–13.2)
WBC # BLD AUTO: 11.4 K/UL (ref 4.6–13.2)
WBC # BLD AUTO: 11.7 K/UL (ref 4.6–13.2)
WBC # BLD AUTO: 16.4 K/UL (ref 4.6–13.2)
WBC # BLD AUTO: 8 K/UL (ref 4.6–13.2)
WBC # BLD AUTO: 8.8 K/UL (ref 4.6–13.2)
WBC # BLD AUTO: 8.9 K/UL (ref 4.6–13.2)
WBC # BLD AUTO: 9.1 K/UL (ref 4.6–13.2)
WBC URNS QL MICRO: ABNORMAL /HPF (ref 0–4)

## 2022-01-01 PROCEDURE — 74011636637 HC RX REV CODE- 636/637: Performed by: INTERNAL MEDICINE

## 2022-01-01 PROCEDURE — 99233 SBSQ HOSP IP/OBS HIGH 50: CPT | Performed by: INTERNAL MEDICINE

## 2022-01-01 PROCEDURE — 00910 ANES TRANSURETHRAL PX NOS: CPT | Performed by: NURSE ANESTHETIST, CERTIFIED REGISTERED

## 2022-01-01 PROCEDURE — 99232 SBSQ HOSP IP/OBS MODERATE 35: CPT | Performed by: INTERNAL MEDICINE

## 2022-01-01 PROCEDURE — 74011250636 HC RX REV CODE- 250/636: Performed by: PHYSICIAN ASSISTANT

## 2022-01-01 PROCEDURE — 85610 PROTHROMBIN TIME: CPT

## 2022-01-01 PROCEDURE — 93005 ELECTROCARDIOGRAM TRACING: CPT

## 2022-01-01 PROCEDURE — 74011000258 HC RX REV CODE- 258: Performed by: STUDENT IN AN ORGANIZED HEALTH CARE EDUCATION/TRAINING PROGRAM

## 2022-01-01 PROCEDURE — 0651 HSPC ROUTINE HOME CARE

## 2022-01-01 PROCEDURE — 94762 N-INVAS EAR/PLS OXIMTRY CONT: CPT

## 2022-01-01 PROCEDURE — 74011250636 HC RX REV CODE- 250/636: Performed by: STUDENT IN AN ORGANIZED HEALTH CARE EDUCATION/TRAINING PROGRAM

## 2022-01-01 PROCEDURE — APPSS30 APP SPLIT SHARED TIME 16-30 MINUTES: Performed by: REGISTERED NURSE

## 2022-01-01 PROCEDURE — 0202U NFCT DS 22 TRGT SARS-COV-2: CPT

## 2022-01-01 PROCEDURE — 74011000250 HC RX REV CODE- 250: Performed by: INTERNAL MEDICINE

## 2022-01-01 PROCEDURE — 2709999900 HC NON-CHARGEABLE SUPPLY: Performed by: UROLOGY

## 2022-01-01 PROCEDURE — 71275 CT ANGIOGRAPHY CHEST: CPT

## 2022-01-01 PROCEDURE — 99100 ANES PT EXTEME AGE<1 YR&>70: CPT | Performed by: NURSE ANESTHETIST, CERTIFIED REGISTERED

## 2022-01-01 PROCEDURE — 74011250637 HC RX REV CODE- 250/637: Performed by: PHYSICIAN ASSISTANT

## 2022-01-01 PROCEDURE — 80048 BASIC METABOLIC PNL TOTAL CA: CPT

## 2022-01-01 PROCEDURE — 74011250637 HC RX REV CODE- 250/637: Performed by: STUDENT IN AN ORGANIZED HEALTH CARE EDUCATION/TRAINING PROGRAM

## 2022-01-01 PROCEDURE — APPSS15 APP SPLIT SHARED TIME 0-15 MINUTES: Performed by: PHYSICIAN ASSISTANT

## 2022-01-01 PROCEDURE — 74011000258 HC RX REV CODE- 258: Performed by: INTERNAL MEDICINE

## 2022-01-01 PROCEDURE — 84100 ASSAY OF PHOSPHORUS: CPT

## 2022-01-01 PROCEDURE — HOSPICE MEDICATION HC HH HOSPICE MEDICATION

## 2022-01-01 PROCEDURE — 84484 ASSAY OF TROPONIN QUANT: CPT

## 2022-01-01 PROCEDURE — 65610000006 HC RM INTENSIVE CARE

## 2022-01-01 PROCEDURE — 65270000029 HC RM PRIVATE

## 2022-01-01 PROCEDURE — 84145 PROCALCITONIN (PCT): CPT

## 2022-01-01 PROCEDURE — 80202 ASSAY OF VANCOMYCIN: CPT

## 2022-01-01 PROCEDURE — 99223 1ST HOSP IP/OBS HIGH 75: CPT | Performed by: INTERNAL MEDICINE

## 2022-01-01 PROCEDURE — APPNB15 APP NON BILLABLE TIME 0-15 MINS: Performed by: NURSE PRACTITIONER

## 2022-01-01 PROCEDURE — 87040 BLOOD CULTURE FOR BACTERIA: CPT

## 2022-01-01 PROCEDURE — 36556 INSERT NON-TUNNEL CV CATH: CPT | Performed by: REGISTERED NURSE

## 2022-01-01 PROCEDURE — 00910 ANES TRANSURETHRAL PX NOS: CPT | Performed by: ANESTHESIOLOGY

## 2022-01-01 PROCEDURE — 96374 THER/PROPH/DIAG INJ IV PUSH: CPT

## 2022-01-01 PROCEDURE — G0299 HHS/HOSPICE OF RN EA 15 MIN: HCPCS

## 2022-01-01 PROCEDURE — 74011250637 HC RX REV CODE- 250/637: Performed by: INTERNAL MEDICINE

## 2022-01-01 PROCEDURE — 3331090004 HSPC SERVICE INTENSITY ADD-ON

## 2022-01-01 PROCEDURE — 76210000016 HC OR PH I REC 1 TO 1.5 HR: Performed by: UROLOGY

## 2022-01-01 PROCEDURE — 99291 CRITICAL CARE FIRST HOUR: CPT | Performed by: INTERNAL MEDICINE

## 2022-01-01 PROCEDURE — 74011000250 HC RX REV CODE- 250: Performed by: STUDENT IN AN ORGANIZED HEALTH CARE EDUCATION/TRAINING PROGRAM

## 2022-01-01 PROCEDURE — 77030019927 HC TBNG IRR CYSTO BAXT -A: Performed by: UROLOGY

## 2022-01-01 PROCEDURE — 82962 GLUCOSE BLOOD TEST: CPT

## 2022-01-01 PROCEDURE — 96360 HYDRATION IV INFUSION INIT: CPT

## 2022-01-01 PROCEDURE — 83036 HEMOGLOBIN GLYCOSYLATED A1C: CPT

## 2022-01-01 PROCEDURE — 74011000258 HC RX REV CODE- 258: Performed by: PHYSICIAN ASSISTANT

## 2022-01-01 PROCEDURE — 83880 ASSAY OF NATRIURETIC PEPTIDE: CPT

## 2022-01-01 PROCEDURE — 80162 ASSAY OF DIGOXIN TOTAL: CPT

## 2022-01-01 PROCEDURE — 83735 ASSAY OF MAGNESIUM: CPT

## 2022-01-01 PROCEDURE — 87150 DNA/RNA AMPLIFIED PROBE: CPT

## 2022-01-01 PROCEDURE — 82330 ASSAY OF CALCIUM: CPT

## 2022-01-01 PROCEDURE — 2709999900 HC NON-CHARGEABLE SUPPLY

## 2022-01-01 PROCEDURE — 74011000250 HC RX REV CODE- 250: Performed by: REGISTERED NURSE

## 2022-01-01 PROCEDURE — 74011250636 HC RX REV CODE- 250/636: Performed by: REGISTERED NURSE

## 2022-01-01 PROCEDURE — 74011250637 HC RX REV CODE- 250/637: Performed by: REGISTERED NURSE

## 2022-01-01 PROCEDURE — 83605 ASSAY OF LACTIC ACID: CPT

## 2022-01-01 PROCEDURE — 74011250636 HC RX REV CODE- 250/636: Performed by: INTERNAL MEDICINE

## 2022-01-01 PROCEDURE — 80053 COMPREHEN METABOLIC PANEL: CPT

## 2022-01-01 PROCEDURE — 74011000250 HC RX REV CODE- 250: Performed by: PHYSICIAN ASSISTANT

## 2022-01-01 PROCEDURE — 76210000020 HC REC RM PH II FIRST 0.5 HR: Performed by: UROLOGY

## 2022-01-01 PROCEDURE — 99222 1ST HOSP IP/OBS MODERATE 55: CPT | Performed by: INTERNAL MEDICINE

## 2022-01-01 PROCEDURE — G0156 HHCP-SVS OF AIDE,EA 15 MIN: HCPCS

## 2022-01-01 PROCEDURE — 74011250636 HC RX REV CODE- 250/636: Performed by: ANESTHESIOLOGY

## 2022-01-01 PROCEDURE — 74011250636 HC RX REV CODE- 250/636: Performed by: UROLOGY

## 2022-01-01 PROCEDURE — 74011000250 HC RX REV CODE- 250: Performed by: NURSE ANESTHETIST, CERTIFIED REGISTERED

## 2022-01-01 PROCEDURE — 99291 CRITICAL CARE FIRST HOUR: CPT | Performed by: PHYSICIAN ASSISTANT

## 2022-01-01 PROCEDURE — 36592 COLLECT BLOOD FROM PICC: CPT

## 2022-01-01 PROCEDURE — 85025 COMPLETE CBC W/AUTO DIFF WBC: CPT

## 2022-01-01 PROCEDURE — 74011636637 HC RX REV CODE- 636/637: Performed by: REGISTERED NURSE

## 2022-01-01 PROCEDURE — 81001 URINALYSIS AUTO W/SCOPE: CPT

## 2022-01-01 PROCEDURE — C2617 STENT, NON-COR, TEM W/O DEL: HCPCS | Performed by: UROLOGY

## 2022-01-01 PROCEDURE — 74011000636 HC RX REV CODE- 636: Performed by: UROLOGY

## 2022-01-01 PROCEDURE — 99285 EMERGENCY DEPT VISIT HI MDM: CPT

## 2022-01-01 PROCEDURE — 96375 TX/PRO/DX INJ NEW DRUG ADDON: CPT

## 2022-01-01 PROCEDURE — 74011250637 HC RX REV CODE- 250/637: Performed by: ANESTHESIOLOGY

## 2022-01-01 PROCEDURE — 71045 X-RAY EXAM CHEST 1 VIEW: CPT

## 2022-01-01 PROCEDURE — 74011250637 HC RX REV CODE- 250/637: Performed by: NURSE ANESTHETIST, CERTIFIED REGISTERED

## 2022-01-01 PROCEDURE — 76010000138 HC OR TIME 0.5 TO 1 HR: Performed by: UROLOGY

## 2022-01-01 PROCEDURE — 74011000636 HC RX REV CODE- 636: Performed by: INTERNAL MEDICINE

## 2022-01-01 PROCEDURE — 87086 URINE CULTURE/COLONY COUNT: CPT

## 2022-01-01 PROCEDURE — 05HN33Z INSERTION OF INFUSION DEVICE INTO LEFT INTERNAL JUGULAR VEIN, PERCUTANEOUS APPROACH: ICD-10-PCS | Performed by: REGISTERED NURSE

## 2022-01-01 PROCEDURE — 02HV33Z INSERTION OF INFUSION DEVICE INTO SUPERIOR VENA CAVA, PERCUTANEOUS APPROACH: ICD-10-PCS | Performed by: STUDENT IN AN ORGANIZED HEALTH CARE EDUCATION/TRAINING PROGRAM

## 2022-01-01 PROCEDURE — 77010033678 HC OXYGEN DAILY

## 2022-01-01 PROCEDURE — 96361 HYDRATE IV INFUSION ADD-ON: CPT

## 2022-01-01 PROCEDURE — 84443 ASSAY THYROID STIM HORMONE: CPT

## 2022-01-01 PROCEDURE — 74420 UROGRAPHY RTRGR +-KUB: CPT

## 2022-01-01 PROCEDURE — 76060000032 HC ANESTHESIA 0.5 TO 1 HR: Performed by: UROLOGY

## 2022-01-01 PROCEDURE — 74011250636 HC RX REV CODE- 250/636: Performed by: NURSE ANESTHETIST, CERTIFIED REGISTERED

## 2022-01-01 PROCEDURE — 74011000250 HC RX REV CODE- 250: Performed by: UROLOGY

## 2022-01-01 PROCEDURE — 36415 COLL VENOUS BLD VENIPUNCTURE: CPT

## 2022-01-01 PROCEDURE — 99100 ANES PT EXTEME AGE<1 YR&>70: CPT | Performed by: ANESTHESIOLOGY

## 2022-01-01 PROCEDURE — 74011250636 HC RX REV CODE- 250/636

## 2022-01-01 PROCEDURE — 84295 ASSAY OF SERUM SODIUM: CPT

## 2022-01-01 DEVICE — URETERAL STENT
Type: IMPLANTABLE DEVICE | Site: URETER | Status: FUNCTIONAL
Brand: POLARIS™ ULTRA

## 2022-01-01 RX ORDER — SODIUM CHLORIDE 0.9 % (FLUSH) 0.9 %
5-40 SYRINGE (ML) INJECTION AS NEEDED
Status: DISCONTINUED | OUTPATIENT
Start: 2022-01-01 | End: 2022-01-01

## 2022-01-01 RX ORDER — HYOSCYAMINE SULFATE 0.12 MG/1
0.12 TABLET SUBLINGUAL
Qty: 10 TABLET | Refills: 0 | Status: SHIPPED | OUTPATIENT
Start: 2022-01-01

## 2022-01-01 RX ORDER — LORAZEPAM 2 MG/ML
1-2 CONCENTRATE ORAL
Qty: 30 ML | Refills: 0 | Status: SHIPPED | OUTPATIENT
Start: 2022-01-01

## 2022-01-01 RX ORDER — HALOPERIDOL 2 MG/ML
2 SOLUTION ORAL
Status: DISCONTINUED | OUTPATIENT
Start: 2022-01-01 | End: 2022-01-01

## 2022-01-01 RX ORDER — LEVOFLOXACIN 5 MG/ML
750 INJECTION, SOLUTION INTRAVENOUS ONCE
Status: COMPLETED | OUTPATIENT
Start: 2022-01-01 | End: 2022-01-01

## 2022-01-01 RX ORDER — LEVOFLOXACIN 5 MG/ML
750 INJECTION, SOLUTION INTRAVENOUS
Status: DISCONTINUED | OUTPATIENT
Start: 2022-01-01 | End: 2022-01-01 | Stop reason: DRUGHIGH

## 2022-01-01 RX ORDER — ACETAMINOPHEN 650 MG/1
650 SUPPOSITORY RECTAL
Qty: 4 SUPPOSITORY | Refills: 0 | Status: SHIPPED | OUTPATIENT
Start: 2022-01-01

## 2022-01-01 RX ORDER — HYDROMORPHONE HYDROCHLORIDE 1 MG/ML
0.2 INJECTION, SOLUTION INTRAMUSCULAR; INTRAVENOUS; SUBCUTANEOUS
Status: DISCONTINUED | OUTPATIENT
Start: 2022-01-01 | End: 2022-01-01 | Stop reason: HOSPADM

## 2022-01-01 RX ORDER — LORAZEPAM 2 MG/ML
0.5 INJECTION INTRAMUSCULAR ONCE
Status: COMPLETED | OUTPATIENT
Start: 2022-01-01 | End: 2022-01-01

## 2022-01-01 RX ORDER — FOLIC ACID 1 MG/1
1 TABLET ORAL DAILY
Status: DISCONTINUED | OUTPATIENT
Start: 2022-01-01 | End: 2022-01-01

## 2022-01-01 RX ORDER — SODIUM CHLORIDE 9 MG/ML
50 INJECTION, SOLUTION INTRAVENOUS CONTINUOUS
Status: DISCONTINUED | OUTPATIENT
Start: 2022-01-01 | End: 2022-01-01

## 2022-01-01 RX ORDER — IODIXANOL 320 MG/ML
INJECTION, SOLUTION INTRAVASCULAR AS NEEDED
Status: DISCONTINUED | OUTPATIENT
Start: 2022-01-01 | End: 2022-01-01 | Stop reason: HOSPADM

## 2022-01-01 RX ORDER — HYOSCYAMINE SULFATE 0.12 MG/1
0.12 TABLET SUBLINGUAL
Status: DISCONTINUED | OUTPATIENT
Start: 2022-01-01 | End: 2022-01-01 | Stop reason: HOSPADM

## 2022-01-01 RX ORDER — ACETAMINOPHEN 325 MG/1
650 TABLET ORAL
Status: DISCONTINUED | OUTPATIENT
Start: 2022-01-01 | End: 2022-01-01

## 2022-01-01 RX ORDER — CHOLECALCIFEROL (VITAMIN D3) 125 MCG
5 CAPSULE ORAL
Status: DISCONTINUED | OUTPATIENT
Start: 2022-01-01 | End: 2022-01-01

## 2022-01-01 RX ORDER — HALOPERIDOL 5 MG/ML
2 INJECTION INTRAMUSCULAR ONCE
Status: COMPLETED | OUTPATIENT
Start: 2022-01-01 | End: 2022-01-01

## 2022-01-01 RX ORDER — SODIUM CHLORIDE 0.9 % (FLUSH) 0.9 %
5-40 SYRINGE (ML) INJECTION EVERY 8 HOURS
Status: DISCONTINUED | OUTPATIENT
Start: 2022-01-01 | End: 2022-01-01

## 2022-01-01 RX ORDER — NOREPINEPHRINE BITARTRATE/D5W 8 MG/250ML
.5-5 PLASTIC BAG, INJECTION (ML) INTRAVENOUS
Status: DISCONTINUED | OUTPATIENT
Start: 2022-01-01 | End: 2022-01-01

## 2022-01-01 RX ORDER — VANCOMYCIN HYDROCHLORIDE
1250 ONCE
Status: DISPENSED | OUTPATIENT
Start: 2022-01-01 | End: 2022-01-01

## 2022-01-01 RX ORDER — HALOPERIDOL 5 MG/ML
2 INJECTION INTRAMUSCULAR
Status: DISCONTINUED | OUTPATIENT
Start: 2022-01-01 | End: 2022-01-01

## 2022-01-01 RX ORDER — DIGOXIN 125 MCG
TABLET ORAL
COMMUNITY
Start: 2022-01-01 | End: 2022-01-01

## 2022-01-01 RX ORDER — LORAZEPAM 2 MG/ML
1 CONCENTRATE ORAL
Status: DISCONTINUED | OUTPATIENT
Start: 2022-01-01 | End: 2022-01-01

## 2022-01-01 RX ORDER — MAGNESIUM SULFATE HEPTAHYDRATE 40 MG/ML
2 INJECTION, SOLUTION INTRAVENOUS ONCE
Status: COMPLETED | OUTPATIENT
Start: 2022-01-01 | End: 2022-01-01

## 2022-01-01 RX ORDER — SCOLOPAMINE TRANSDERMAL SYSTEM 1 MG/1
1 PATCH, EXTENDED RELEASE TRANSDERMAL
Status: DISCONTINUED | OUTPATIENT
Start: 2022-01-01 | End: 2022-01-01

## 2022-01-01 RX ORDER — ONDANSETRON 4 MG/1
4 TABLET, ORALLY DISINTEGRATING ORAL
Status: DISCONTINUED | OUTPATIENT
Start: 2022-01-01 | End: 2022-01-01

## 2022-01-01 RX ORDER — MORPHINE SULFATE 15 MG/1
7.5 TABLET ORAL
Status: DISCONTINUED | OUTPATIENT
Start: 2022-01-01 | End: 2022-01-01

## 2022-01-01 RX ORDER — SODIUM BICARBONATE 1 MEQ/ML
50 SYRINGE (ML) INTRAVENOUS ONCE
Status: COMPLETED | OUTPATIENT
Start: 2022-01-01 | End: 2022-01-01

## 2022-01-01 RX ORDER — FACIAL-BODY WIPES
10 EACH TOPICAL DAILY PRN
Status: DISCONTINUED | OUTPATIENT
Start: 2022-01-01 | End: 2022-01-01

## 2022-01-01 RX ORDER — LORAZEPAM 2 MG/ML
1 INJECTION INTRAMUSCULAR
Status: DISCONTINUED | OUTPATIENT
Start: 2022-01-01 | End: 2022-01-01

## 2022-01-01 RX ORDER — CEFPODOXIME PROXETIL 200 MG/1
400 TABLET, FILM COATED ORAL EVERY 24 HOURS
Status: DISCONTINUED | OUTPATIENT
Start: 2022-01-01 | End: 2022-01-01

## 2022-01-01 RX ORDER — MORPHINE SULFATE 20 MG/ML
2.5-5 SOLUTION ORAL
Status: DISCONTINUED | OUTPATIENT
Start: 2022-01-01 | End: 2022-01-01

## 2022-01-01 RX ORDER — SODIUM CHLORIDE 0.9 % (FLUSH) 0.9 %
5-40 SYRINGE (ML) INJECTION AS NEEDED
Status: CANCELLED | OUTPATIENT
Start: 2022-01-01

## 2022-01-01 RX ORDER — DIPHENHYDRAMINE HYDROCHLORIDE 50 MG/ML
25 INJECTION, SOLUTION INTRAMUSCULAR; INTRAVENOUS ONCE
Status: COMPLETED | OUTPATIENT
Start: 2022-01-01 | End: 2022-01-01

## 2022-01-01 RX ORDER — HALOPERIDOL 5 MG/ML
2 INJECTION INTRAMUSCULAR
Status: DISCONTINUED | OUTPATIENT
Start: 2022-01-01 | End: 2022-01-01 | Stop reason: HOSPADM

## 2022-01-01 RX ORDER — HYOSCYAMINE SULFATE 0.12 MG/1
0.12 TABLET SUBLINGUAL
Status: DISCONTINUED | OUTPATIENT
Start: 2022-01-01 | End: 2022-01-01

## 2022-01-01 RX ORDER — MAGNESIUM SULFATE 100 %
4 CRYSTALS MISCELLANEOUS AS NEEDED
Status: DISCONTINUED | OUTPATIENT
Start: 2022-01-01 | End: 2022-01-01

## 2022-01-01 RX ORDER — POLYETHYLENE GLYCOL 3350 17 G/17G
17 POWDER, FOR SOLUTION ORAL DAILY PRN
Status: DISCONTINUED | OUTPATIENT
Start: 2022-01-01 | End: 2022-01-01

## 2022-01-01 RX ORDER — VANCOMYCIN 2 GRAM/500 ML IN 0.9 % SODIUM CHLORIDE INTRAVENOUS
2000 ONCE
Status: COMPLETED | OUTPATIENT
Start: 2022-01-01 | End: 2022-01-01

## 2022-01-01 RX ORDER — CEFPODOXIME PROXETIL 200 MG/1
200 TABLET, FILM COATED ORAL EVERY 12 HOURS
Status: DISCONTINUED | OUTPATIENT
Start: 2022-01-01 | End: 2022-01-01

## 2022-01-01 RX ORDER — INSULIN LISPRO 100 [IU]/ML
INJECTION, SOLUTION INTRAVENOUS; SUBCUTANEOUS
Status: DISCONTINUED | OUTPATIENT
Start: 2022-01-01 | End: 2022-01-01

## 2022-01-01 RX ORDER — LIDOCAINE HYDROCHLORIDE 20 MG/ML
INJECTION, SOLUTION EPIDURAL; INFILTRATION; INTRACAUDAL; PERINEURAL AS NEEDED
Status: DISCONTINUED | OUTPATIENT
Start: 2022-01-01 | End: 2022-01-01 | Stop reason: HOSPADM

## 2022-01-01 RX ORDER — DEXTROSE MONOHYDRATE AND SODIUM CHLORIDE 5; .45 G/100ML; G/100ML
50 INJECTION, SOLUTION INTRAVENOUS CONTINUOUS
Status: DISCONTINUED | OUTPATIENT
Start: 2022-01-01 | End: 2022-01-01

## 2022-01-01 RX ORDER — LORAZEPAM 2 MG/ML
1 INJECTION INTRAMUSCULAR
Status: DISCONTINUED | OUTPATIENT
Start: 2022-01-01 | End: 2022-01-01 | Stop reason: HOSPADM

## 2022-01-01 RX ORDER — LEVOFLOXACIN 5 MG/ML
500 INJECTION, SOLUTION INTRAVENOUS
Status: DISCONTINUED | OUTPATIENT
Start: 2022-01-01 | End: 2022-01-01

## 2022-01-01 RX ORDER — ONDANSETRON 2 MG/ML
4 INJECTION INTRAMUSCULAR; INTRAVENOUS
Status: DISCONTINUED | OUTPATIENT
Start: 2022-01-01 | End: 2022-01-01

## 2022-01-01 RX ORDER — DOPAMINE HYDROCHLORIDE 160 MG/100ML
3-20 INJECTION, SOLUTION INTRAVENOUS
Status: DISCONTINUED | OUTPATIENT
Start: 2022-01-01 | End: 2022-01-01

## 2022-01-01 RX ORDER — ACETAMINOPHEN 650 MG/1
650 SUPPOSITORY RECTAL
Status: DISCONTINUED | OUTPATIENT
Start: 2022-01-01 | End: 2022-01-01

## 2022-01-01 RX ORDER — ONDANSETRON 2 MG/ML
4 INJECTION INTRAMUSCULAR; INTRAVENOUS
Status: COMPLETED | OUTPATIENT
Start: 2022-01-01 | End: 2022-01-01

## 2022-01-01 RX ORDER — MORPHINE SULFATE 20 MG/ML
5-20 SOLUTION ORAL
Qty: 30 ML | Refills: 0 | Status: SHIPPED | OUTPATIENT
Start: 2022-01-01 | End: 2022-11-26

## 2022-01-01 RX ORDER — ATROPINE SULFATE 1 MG/ML
1 INJECTION, SOLUTION INTRAVENOUS ONCE
Status: COMPLETED | OUTPATIENT
Start: 2022-01-01 | End: 2022-01-01

## 2022-01-01 RX ORDER — SODIUM CHLORIDE 9 MG/ML
50 INJECTION, SOLUTION INTRAVENOUS CONTINUOUS
Status: DISCONTINUED | OUTPATIENT
Start: 2022-01-01 | End: 2022-01-01 | Stop reason: HOSPADM

## 2022-01-01 RX ORDER — FAMOTIDINE 20 MG/1
20 TABLET, FILM COATED ORAL ONCE
Status: COMPLETED | OUTPATIENT
Start: 2022-01-01 | End: 2022-01-01

## 2022-01-01 RX ORDER — CALCIUM GLUCONATE 20 MG/ML
1 INJECTION, SOLUTION INTRAVENOUS ONCE
Status: COMPLETED | OUTPATIENT
Start: 2022-01-01 | End: 2022-01-01

## 2022-01-01 RX ORDER — LORAZEPAM 2 MG/ML
1 INJECTION INTRAMUSCULAR ONCE
Status: COMPLETED | OUTPATIENT
Start: 2022-01-01 | End: 2022-01-01

## 2022-01-01 RX ORDER — MIDODRINE HYDROCHLORIDE 5 MG/1
5 TABLET ORAL 2 TIMES DAILY WITH MEALS
Qty: 60 TABLET | Refills: 1 | Status: SHIPPED | OUTPATIENT
Start: 2022-01-01 | End: 2022-12-22

## 2022-01-01 RX ORDER — FACIAL-BODY WIPES
10 EACH TOPICAL
Qty: 5 SUPPOSITORY | Refills: 0 | Status: SHIPPED | OUTPATIENT
Start: 2022-01-01

## 2022-01-01 RX ORDER — SODIUM CHLORIDE 0.9 % (FLUSH) 0.9 %
5-40 SYRINGE (ML) INJECTION EVERY 8 HOURS
Status: CANCELLED | OUTPATIENT
Start: 2022-01-01

## 2022-01-01 RX ORDER — MIDODRINE HYDROCHLORIDE 5 MG/1
5 TABLET ORAL 2 TIMES DAILY WITH MEALS
Status: DISCONTINUED | OUTPATIENT
Start: 2022-01-01 | End: 2022-01-01 | Stop reason: HOSPADM

## 2022-01-01 RX ORDER — ALBUTEROL SULFATE 0.83 MG/ML
2.5 SOLUTION RESPIRATORY (INHALATION)
Status: DISCONTINUED | OUTPATIENT
Start: 2022-01-01 | End: 2022-01-01

## 2022-01-01 RX ORDER — DEXTROSE MONOHYDRATE 100 MG/ML
250 INJECTION, SOLUTION INTRAVENOUS ONCE
Status: COMPLETED | OUTPATIENT
Start: 2022-01-01 | End: 2022-01-01

## 2022-01-01 RX ORDER — DIPHENHYDRAMINE HCL 25 MG
25 CAPSULE ORAL ONCE
Status: COMPLETED | OUTPATIENT
Start: 2022-01-01 | End: 2022-01-01

## 2022-01-01 RX ORDER — GUAIFENESIN 100 MG/5ML
81 LIQUID (ML) ORAL DAILY
Status: DISCONTINUED | OUTPATIENT
Start: 2022-01-01 | End: 2022-01-01

## 2022-01-01 RX ORDER — EPHEDRINE SULFATE/0.9% NACL/PF 25 MG/5 ML
SYRINGE (ML) INTRAVENOUS AS NEEDED
Status: DISCONTINUED | OUTPATIENT
Start: 2022-01-01 | End: 2022-01-01 | Stop reason: HOSPADM

## 2022-01-01 RX ORDER — LORAZEPAM 2 MG/ML
0.5 CONCENTRATE ORAL
Status: DISCONTINUED | OUTPATIENT
Start: 2022-01-01 | End: 2022-01-01

## 2022-01-01 RX ORDER — MORPHINE SULFATE 2 MG/ML
2 INJECTION, SOLUTION INTRAMUSCULAR; INTRAVENOUS
Status: DISCONTINUED | OUTPATIENT
Start: 2022-01-01 | End: 2022-01-01

## 2022-01-01 RX ORDER — MIDODRINE HYDROCHLORIDE 5 MG/1
10 TABLET ORAL AS NEEDED
Status: COMPLETED | OUTPATIENT
Start: 2022-01-01 | End: 2022-01-01

## 2022-01-01 RX ORDER — LEVOFLOXACIN 250 MG/1
500 TABLET ORAL
Status: DISCONTINUED | OUTPATIENT
Start: 2022-01-01 | End: 2022-01-01

## 2022-01-01 RX ORDER — HEPARIN SODIUM 5000 [USP'U]/ML
5000 INJECTION, SOLUTION INTRAVENOUS; SUBCUTANEOUS EVERY 8 HOURS
Status: DISCONTINUED | OUTPATIENT
Start: 2022-01-01 | End: 2022-01-01

## 2022-01-01 RX ORDER — VANCOMYCIN HYDROCHLORIDE
1250 ONCE
Status: DISCONTINUED | OUTPATIENT
Start: 2022-01-01 | End: 2022-01-01

## 2022-01-01 RX ORDER — METOPROLOL TARTRATE 25 MG/1
TABLET, FILM COATED ORAL
COMMUNITY
Start: 2022-01-01 | End: 2022-01-01

## 2022-01-01 RX ORDER — CLOPIDOGREL BISULFATE 75 MG/1
75 TABLET ORAL DAILY
Status: DISCONTINUED | OUTPATIENT
Start: 2022-01-01 | End: 2022-01-01

## 2022-01-01 RX ORDER — PHENYLEPHRINE HCL IN 0.9% NACL 1 MG/10 ML
SYRINGE (ML) INTRAVENOUS AS NEEDED
Status: DISCONTINUED | OUTPATIENT
Start: 2022-01-01 | End: 2022-01-01 | Stop reason: HOSPADM

## 2022-01-01 RX ORDER — PROPOFOL 10 MG/ML
INJECTION, EMULSION INTRAVENOUS AS NEEDED
Status: DISCONTINUED | OUTPATIENT
Start: 2022-01-01 | End: 2022-01-01 | Stop reason: HOSPADM

## 2022-01-01 RX ORDER — MIDODRINE HYDROCHLORIDE 5 MG/1
10 TABLET ORAL
Status: DISCONTINUED | OUTPATIENT
Start: 2022-01-01 | End: 2022-01-01

## 2022-01-01 RX ORDER — CHOLECALCIFEROL (VITAMIN D3) 125 MCG
5 CAPSULE ORAL
Status: DISCONTINUED | OUTPATIENT
Start: 2022-01-01 | End: 2022-01-01 | Stop reason: SDUPTHER

## 2022-01-01 RX ORDER — HALOPERIDOL 2 MG/ML
2 SOLUTION ORAL
Status: DISCONTINUED | OUTPATIENT
Start: 2022-01-01 | End: 2022-01-01 | Stop reason: HOSPADM

## 2022-01-01 RX ORDER — MIDODRINE HYDROCHLORIDE 5 MG/1
10 TABLET ORAL ONCE
Status: COMPLETED | OUTPATIENT
Start: 2022-01-01 | End: 2022-01-01

## 2022-01-01 RX ORDER — MORPHINE SULFATE 20 MG/ML
10 SOLUTION ORAL
Status: DISCONTINUED | OUTPATIENT
Start: 2022-01-01 | End: 2022-01-01

## 2022-01-01 RX ORDER — DIPHENHYDRAMINE HCL 25 MG
25 CAPSULE ORAL
Status: DISCONTINUED | OUTPATIENT
Start: 2022-01-01 | End: 2022-01-01

## 2022-01-01 RX ORDER — DEXTROSE MONOHYDRATE 100 MG/ML
0-250 INJECTION, SOLUTION INTRAVENOUS AS NEEDED
Status: DISCONTINUED | OUTPATIENT
Start: 2022-01-01 | End: 2022-01-01

## 2022-01-01 RX ORDER — SODIUM CHLORIDE, SODIUM LACTATE, POTASSIUM CHLORIDE, CALCIUM CHLORIDE 600; 310; 30; 20 MG/100ML; MG/100ML; MG/100ML; MG/100ML
50 INJECTION, SOLUTION INTRAVENOUS CONTINUOUS
Status: DISCONTINUED | OUTPATIENT
Start: 2022-01-01 | End: 2022-01-01

## 2022-01-01 RX ORDER — DULOXETIN HYDROCHLORIDE 20 MG/1
20 CAPSULE, DELAYED RELEASE ORAL DAILY
Status: DISCONTINUED | OUTPATIENT
Start: 2022-01-01 | End: 2022-01-01

## 2022-01-01 RX ADMIN — DEXTROSE MONOHYDRATE 250 ML: 100 INJECTION, SOLUTION INTRAVENOUS at 07:34

## 2022-01-01 RX ADMIN — SODIUM CHLORIDE, PRESERVATIVE FREE 10 ML: 5 INJECTION INTRAVENOUS at 21:42

## 2022-01-01 RX ADMIN — FOLIC ACID 1 MG: 1 TABLET ORAL at 08:57

## 2022-01-01 RX ADMIN — MAGNESIUM SULFATE HEPTAHYDRATE 2 G: 40 INJECTION, SOLUTION INTRAVENOUS at 06:35

## 2022-01-01 RX ADMIN — SODIUM CHLORIDE, PRESERVATIVE FREE 10 ML: 5 INJECTION INTRAVENOUS at 21:20

## 2022-01-01 RX ADMIN — MORPHINE SULFATE 5 MG: 10 SOLUTION ORAL at 08:44

## 2022-01-01 RX ADMIN — HEPARIN SODIUM 5000 UNITS: 5000 INJECTION INTRAVENOUS; SUBCUTANEOUS at 06:58

## 2022-01-01 RX ADMIN — Medication 2 UNITS: at 12:36

## 2022-01-01 RX ADMIN — ACETAMINOPHEN 650 MG: 325 TABLET, FILM COATED ORAL at 22:01

## 2022-01-01 RX ADMIN — CLOPIDOGREL BISULFATE 75 MG: 75 TABLET ORAL at 10:19

## 2022-01-01 RX ADMIN — Medication 0.5 MG: at 19:06

## 2022-01-01 RX ADMIN — SODIUM CHLORIDE, PRESERVATIVE FREE 20 MG: 5 INJECTION INTRAVENOUS at 08:56

## 2022-01-01 RX ADMIN — HEPARIN SODIUM 5000 UNITS: 5000 INJECTION INTRAVENOUS; SUBCUTANEOUS at 05:34

## 2022-01-01 RX ADMIN — HEPARIN SODIUM 5000 UNITS: 5000 INJECTION INTRAVENOUS; SUBCUTANEOUS at 14:00

## 2022-01-01 RX ADMIN — HEPARIN SODIUM 5000 UNITS: 5000 INJECTION INTRAVENOUS; SUBCUTANEOUS at 06:14

## 2022-01-01 RX ADMIN — HEPARIN SODIUM 5000 UNITS: 5000 INJECTION INTRAVENOUS; SUBCUTANEOUS at 05:40

## 2022-01-01 RX ADMIN — DEXTROSE MONOHYDRATE 15 MCG/MIN: 50 INJECTION, SOLUTION INTRAVENOUS at 20:37

## 2022-01-01 RX ADMIN — HEPARIN SODIUM 5000 UNITS: 5000 INJECTION INTRAVENOUS; SUBCUTANEOUS at 21:12

## 2022-01-01 RX ADMIN — ASPIRIN 81 MG 81 MG: 81 TABLET ORAL at 08:57

## 2022-01-01 RX ADMIN — Medication 0.5 MG: at 14:13

## 2022-01-01 RX ADMIN — HALOPERIDOL LACTATE 2 MG: 5 INJECTION, SOLUTION INTRAMUSCULAR at 10:23

## 2022-01-01 RX ADMIN — FOLIC ACID 1 MG: 1 TABLET ORAL at 08:34

## 2022-01-01 RX ADMIN — LORAZEPAM 0.5 MG: 2 INJECTION INTRAMUSCULAR; INTRAVENOUS at 22:30

## 2022-01-01 RX ADMIN — SODIUM CHLORIDE, PRESERVATIVE FREE 20 MG: 5 INJECTION INTRAVENOUS at 08:34

## 2022-01-01 RX ADMIN — LORAZEPAM 0.5 MG: 2 INJECTION, SOLUTION INTRAMUSCULAR; INTRAVENOUS at 11:00

## 2022-01-01 RX ADMIN — ASPIRIN 81 MG 81 MG: 81 TABLET ORAL at 08:24

## 2022-01-01 RX ADMIN — ACETAMINOPHEN 650 MG: 325 TABLET, FILM COATED ORAL at 02:49

## 2022-01-01 RX ADMIN — ONDANSETRON 4 MG: 2 INJECTION INTRAMUSCULAR; INTRAVENOUS at 23:35

## 2022-01-01 RX ADMIN — FOLIC ACID 1 MG: 1 TABLET ORAL at 08:49

## 2022-01-01 RX ADMIN — HEPARIN SODIUM 5000 UNITS: 5000 INJECTION INTRAVENOUS; SUBCUTANEOUS at 06:00

## 2022-01-01 RX ADMIN — CEFEPIME 2 G: 2 INJECTION, POWDER, FOR SOLUTION INTRAVENOUS at 21:08

## 2022-01-01 RX ADMIN — FOLIC ACID 1 MG: 1 TABLET ORAL at 08:24

## 2022-01-01 RX ADMIN — EPOETIN ALFA-EPBX 8000 UNITS: 4000 INJECTION, SOLUTION INTRAVENOUS; SUBCUTANEOUS at 21:41

## 2022-01-01 RX ADMIN — MIDODRINE HYDROCHLORIDE 10 MG: 5 TABLET ORAL at 16:08

## 2022-01-01 RX ADMIN — MORPHINE SULFATE 2.6 MG: 10 SOLUTION ORAL at 17:20

## 2022-01-01 RX ADMIN — DEXTROSE MONOHYDRATE 12 MCG/MIN: 50 INJECTION, SOLUTION INTRAVENOUS at 18:59

## 2022-01-01 RX ADMIN — MIDODRINE HYDROCHLORIDE 10 MG: 5 TABLET ORAL at 08:44

## 2022-01-01 RX ADMIN — MORPHINE SULFATE 7.5 MG: 15 TABLET ORAL at 00:08

## 2022-01-01 RX ADMIN — HEPARIN SODIUM 5000 UNITS: 5000 INJECTION INTRAVENOUS; SUBCUTANEOUS at 13:09

## 2022-01-01 RX ADMIN — Medication 5 MG: at 21:16

## 2022-01-01 RX ADMIN — PROPOFOL 50 MG: 10 INJECTION, EMULSION INTRAVENOUS at 15:09

## 2022-01-01 RX ADMIN — CEFTRIAXONE 1 G: 1 INJECTION, POWDER, FOR SOLUTION INTRAMUSCULAR; INTRAVENOUS at 14:55

## 2022-01-01 RX ADMIN — MIDODRINE HYDROCHLORIDE 10 MG: 5 TABLET ORAL at 11:52

## 2022-01-01 RX ADMIN — DOPAMINE HYDROCHLORIDE 3 MCG/KG/MIN: 160 INJECTION, SOLUTION INTRAVENOUS at 05:21

## 2022-01-01 RX ADMIN — HEPARIN SODIUM 5000 UNITS: 5000 INJECTION INTRAVENOUS; SUBCUTANEOUS at 21:16

## 2022-01-01 RX ADMIN — CLOPIDOGREL BISULFATE 75 MG: 75 TABLET ORAL at 08:57

## 2022-01-01 RX ADMIN — DIPHENHYDRAMINE HYDROCHLORIDE 25 MG: 50 INJECTION, SOLUTION INTRAMUSCULAR; INTRAVENOUS at 04:53

## 2022-01-01 RX ADMIN — SODIUM CHLORIDE, PRESERVATIVE FREE 10 ML: 5 INJECTION INTRAVENOUS at 13:23

## 2022-01-01 RX ADMIN — SODIUM CHLORIDE, PRESERVATIVE FREE 10 ML: 5 INJECTION INTRAVENOUS at 05:41

## 2022-01-01 RX ADMIN — CLOPIDOGREL BISULFATE 75 MG: 75 TABLET ORAL at 08:38

## 2022-01-01 RX ADMIN — DOPAMINE HYDROCHLORIDE 7 MCG/KG/MIN: 160 INJECTION, SOLUTION INTRAVENOUS at 02:59

## 2022-01-01 RX ADMIN — LORAZEPAM 1 MG: 2 INJECTION INTRAMUSCULAR; INTRAVENOUS at 00:59

## 2022-01-01 RX ADMIN — MORPHINE SULFATE 7.5 MG: 15 TABLET ORAL at 23:21

## 2022-01-01 RX ADMIN — HEPARIN SODIUM 5000 UNITS: 5000 INJECTION INTRAVENOUS; SUBCUTANEOUS at 21:35

## 2022-01-01 RX ADMIN — HEPARIN SODIUM 5000 UNITS: 5000 INJECTION INTRAVENOUS; SUBCUTANEOUS at 13:22

## 2022-01-01 RX ADMIN — GLUCAGON HYDROCHLORIDE 1 MG: KIT at 02:37

## 2022-01-01 RX ADMIN — SODIUM CHLORIDE, PRESERVATIVE FREE 20 MG: 5 INJECTION INTRAVENOUS at 08:44

## 2022-01-01 RX ADMIN — SODIUM CHLORIDE 250 ML: 9 INJECTION, SOLUTION INTRAVENOUS at 17:26

## 2022-01-01 RX ADMIN — SODIUM CHLORIDE 50 ML/HR: 9 INJECTION, SOLUTION INTRAVENOUS at 12:36

## 2022-01-01 RX ADMIN — DULOXETINE HYDROCHLORIDE 20 MG: 20 CAPSULE, DELAYED RELEASE ORAL at 08:49

## 2022-01-01 RX ADMIN — DOPAMINE HYDROCHLORIDE 5 MCG/KG/MIN: 160 INJECTION, SOLUTION INTRAVENOUS at 03:02

## 2022-01-01 RX ADMIN — MIDODRINE HYDROCHLORIDE 10 MG: 5 TABLET ORAL at 13:40

## 2022-01-01 RX ADMIN — LEVOFLOXACIN 750 MG: 750 INJECTION, SOLUTION INTRAVENOUS at 16:59

## 2022-01-01 RX ADMIN — MIDODRINE HYDROCHLORIDE 10 MG: 5 TABLET ORAL at 08:06

## 2022-01-01 RX ADMIN — MORPHINE SULFATE 7.5 MG: 15 TABLET ORAL at 23:12

## 2022-01-01 RX ADMIN — DULOXETINE HYDROCHLORIDE 20 MG: 20 CAPSULE, DELAYED RELEASE ORAL at 08:24

## 2022-01-01 RX ADMIN — CLOPIDOGREL BISULFATE 75 MG: 75 TABLET ORAL at 08:49

## 2022-01-01 RX ADMIN — SODIUM CHLORIDE, PRESERVATIVE FREE 10 ML: 5 INJECTION INTRAVENOUS at 22:28

## 2022-01-01 RX ADMIN — SODIUM CHLORIDE, PRESERVATIVE FREE 10 ML: 5 INJECTION INTRAVENOUS at 05:37

## 2022-01-01 RX ADMIN — Medication 0.5 MG: at 00:08

## 2022-01-01 RX ADMIN — SODIUM CHLORIDE, PRESERVATIVE FREE 10 ML: 5 INJECTION INTRAVENOUS at 06:30

## 2022-01-01 RX ADMIN — THIAMINE HYDROCHLORIDE 200 MG: 100 INJECTION, SOLUTION INTRAMUSCULAR; INTRAVENOUS at 00:56

## 2022-01-01 RX ADMIN — Medication 5 MG: at 21:36

## 2022-01-01 RX ADMIN — LORAZEPAM 1 MG: 2 INJECTION INTRAMUSCULAR; INTRAVENOUS at 12:13

## 2022-01-01 RX ADMIN — HEPARIN SODIUM 5000 UNITS: 5000 INJECTION INTRAVENOUS; SUBCUTANEOUS at 06:25

## 2022-01-01 RX ADMIN — HEPARIN SODIUM 5000 UNITS: 5000 INJECTION INTRAVENOUS; SUBCUTANEOUS at 21:41

## 2022-01-01 RX ADMIN — ONDANSETRON 4 MG: 2 INJECTION INTRAMUSCULAR; INTRAVENOUS at 03:41

## 2022-01-01 RX ADMIN — CEFEPIME 1 G: 1 INJECTION, POWDER, FOR SOLUTION INTRAMUSCULAR; INTRAVENOUS at 20:28

## 2022-01-01 RX ADMIN — IOPAMIDOL 80 ML: 755 INJECTION, SOLUTION INTRAVENOUS at 01:35

## 2022-01-01 RX ADMIN — CEFEPIME 1 G: 1 INJECTION, POWDER, FOR SOLUTION INTRAMUSCULAR; INTRAVENOUS at 22:01

## 2022-01-01 RX ADMIN — DULOXETINE HYDROCHLORIDE 20 MG: 20 CAPSULE, DELAYED RELEASE ORAL at 10:18

## 2022-01-01 RX ADMIN — SODIUM CHLORIDE 50 ML/HR: 9 INJECTION, SOLUTION INTRAVENOUS at 14:00

## 2022-01-01 RX ADMIN — ASPIRIN 81 MG 81 MG: 81 TABLET ORAL at 08:06

## 2022-01-01 RX ADMIN — Medication 0.5 MG: at 23:21

## 2022-01-01 RX ADMIN — PROPOFOL 50 MG: 10 INJECTION, EMULSION INTRAVENOUS at 14:58

## 2022-01-01 RX ADMIN — Medication 20 MG: at 15:31

## 2022-01-01 RX ADMIN — Medication 2 UNITS: at 21:41

## 2022-01-01 RX ADMIN — MIDODRINE HYDROCHLORIDE 10 MG: 5 TABLET ORAL at 10:18

## 2022-01-01 RX ADMIN — Medication 2 UNITS: at 16:55

## 2022-01-01 RX ADMIN — CEFEPIME 1 G: 1 INJECTION, POWDER, FOR SOLUTION INTRAMUSCULAR; INTRAVENOUS at 21:42

## 2022-01-01 RX ADMIN — DOPAMINE HYDROCHLORIDE 5 MCG/KG/MIN: 160 INJECTION, SOLUTION INTRAVENOUS at 08:06

## 2022-01-01 RX ADMIN — DEXTROSE MONOHYDRATE 1 MCG/MIN: 50 INJECTION, SOLUTION INTRAVENOUS at 00:00

## 2022-01-01 RX ADMIN — ACETAMINOPHEN 650 MG: 325 TABLET, FILM COATED ORAL at 18:20

## 2022-01-01 RX ADMIN — DEXTROSE MONOHYDRATE 14 MCG/MIN: 50 INJECTION, SOLUTION INTRAVENOUS at 11:07

## 2022-01-01 RX ADMIN — MORPHINE SULFATE 2.6 MG: 10 SOLUTION ORAL at 16:25

## 2022-01-01 RX ADMIN — DIPHENHYDRAMINE HYDROCHLORIDE 25 MG: 25 CAPSULE ORAL at 22:37

## 2022-01-01 RX ADMIN — HEPARIN SODIUM 5000 UNITS: 5000 INJECTION INTRAVENOUS; SUBCUTANEOUS at 14:12

## 2022-01-01 RX ADMIN — Medication 10 UNITS: at 07:34

## 2022-01-01 RX ADMIN — Medication 100 MCG: at 15:08

## 2022-01-01 RX ADMIN — Medication 2 UNITS: at 08:29

## 2022-01-01 RX ADMIN — HALOPERIDOL LACTATE 2 MG: 5 INJECTION, SOLUTION INTRAMUSCULAR at 16:10

## 2022-01-01 RX ADMIN — SODIUM CHLORIDE 250 ML: 9 INJECTION, SOLUTION INTRAVENOUS at 20:17

## 2022-01-01 RX ADMIN — Medication 0.5 MG: at 23:28

## 2022-01-01 RX ADMIN — DEXTROSE MONOHYDRATE 8 MCG/MIN: 50 INJECTION, SOLUTION INTRAVENOUS at 05:18

## 2022-01-01 RX ADMIN — MORPHINE SULFATE 2 MG: 2 INJECTION, SOLUTION INTRAMUSCULAR; INTRAVENOUS at 20:06

## 2022-01-01 RX ADMIN — FOLIC ACID 1 MG: 1 TABLET ORAL at 13:41

## 2022-01-01 RX ADMIN — SODIUM CHLORIDE, PRESERVATIVE FREE 10 ML: 5 INJECTION INTRAVENOUS at 14:28

## 2022-01-01 RX ADMIN — SODIUM CHLORIDE, PRESERVATIVE FREE 10 ML: 5 INJECTION INTRAVENOUS at 22:02

## 2022-01-01 RX ADMIN — FOLIC ACID 1 MG: 1 TABLET ORAL at 23:26

## 2022-01-01 RX ADMIN — LIDOCAINE HYDROCHLORIDE 60 MG: 20 INJECTION, SOLUTION EPIDURAL; INFILTRATION; INTRACAUDAL; PERINEURAL at 14:53

## 2022-01-01 RX ADMIN — SODIUM CHLORIDE, PRESERVATIVE FREE 10 ML: 5 INJECTION INTRAVENOUS at 13:50

## 2022-01-01 RX ADMIN — Medication 5 MG: at 22:37

## 2022-01-01 RX ADMIN — DIPHENHYDRAMINE HYDROCHLORIDE 25 MG: 25 CAPSULE ORAL at 19:08

## 2022-01-01 RX ADMIN — ONDANSETRON 4 MG: 2 INJECTION INTRAMUSCULAR; INTRAVENOUS at 17:17

## 2022-01-01 RX ADMIN — VANCOMYCIN HYDROCHLORIDE 2000 MG: 500 INJECTION, POWDER, LYOPHILIZED, FOR SOLUTION INTRAVENOUS at 21:16

## 2022-01-01 RX ADMIN — Medication 0.5 MG: at 05:35

## 2022-01-01 RX ADMIN — ONDANSETRON 4 MG: 2 INJECTION INTRAMUSCULAR; INTRAVENOUS at 17:43

## 2022-01-01 RX ADMIN — MIDODRINE HYDROCHLORIDE 10 MG: 5 TABLET ORAL at 11:29

## 2022-01-01 RX ADMIN — MIDODRINE HYDROCHLORIDE 10 MG: 5 TABLET ORAL at 18:54

## 2022-01-01 RX ADMIN — Medication 2 UNITS: at 21:53

## 2022-01-01 RX ADMIN — DEXTROSE MONOHYDRATE 6 MCG/MIN: 50 INJECTION, SOLUTION INTRAVENOUS at 05:21

## 2022-01-01 RX ADMIN — THIAMINE HYDROCHLORIDE 200 MG: 100 INJECTION, SOLUTION INTRAMUSCULAR; INTRAVENOUS at 08:34

## 2022-01-01 RX ADMIN — SODIUM CHLORIDE, PRESERVATIVE FREE 10 ML: 5 INJECTION INTRAVENOUS at 22:43

## 2022-01-01 RX ADMIN — DULOXETINE HYDROCHLORIDE 20 MG: 20 CAPSULE, DELAYED RELEASE ORAL at 08:44

## 2022-01-01 RX ADMIN — HEPARIN SODIUM 5000 UNITS: 5000 INJECTION INTRAVENOUS; SUBCUTANEOUS at 22:41

## 2022-01-01 RX ADMIN — CLOPIDOGREL BISULFATE 75 MG: 75 TABLET ORAL at 08:06

## 2022-01-01 RX ADMIN — LORAZEPAM 1 MG: 2 INJECTION INTRAMUSCULAR; INTRAVENOUS at 00:09

## 2022-01-01 RX ADMIN — FOLIC ACID 1 MG: 1 TABLET ORAL at 08:06

## 2022-01-01 RX ADMIN — DOPAMINE HYDROCHLORIDE 7 MCG/KG/MIN: 160 INJECTION, SOLUTION INTRAVENOUS at 01:47

## 2022-01-01 RX ADMIN — MIDODRINE HYDROCHLORIDE 10 MG: 5 TABLET ORAL at 15:55

## 2022-01-01 RX ADMIN — SODIUM CHLORIDE 50 ML/HR: 9 INJECTION, SOLUTION INTRAVENOUS at 09:34

## 2022-01-01 RX ADMIN — HEPARIN SODIUM 5000 UNITS: 5000 INJECTION INTRAVENOUS; SUBCUTANEOUS at 23:26

## 2022-01-01 RX ADMIN — HEPARIN SODIUM 5000 UNITS: 5000 INJECTION INTRAVENOUS; SUBCUTANEOUS at 16:55

## 2022-01-01 RX ADMIN — DEXTROSE MONOHYDRATE 15 MCG/MIN: 50 INJECTION, SOLUTION INTRAVENOUS at 11:39

## 2022-01-01 RX ADMIN — ASPIRIN 81 MG 81 MG: 81 TABLET ORAL at 08:38

## 2022-01-01 RX ADMIN — THIAMINE HYDROCHLORIDE 200 MG: 100 INJECTION, SOLUTION INTRAMUSCULAR; INTRAVENOUS at 08:19

## 2022-01-01 RX ADMIN — HEPARIN SODIUM 5000 UNITS: 5000 INJECTION INTRAVENOUS; SUBCUTANEOUS at 22:02

## 2022-01-01 RX ADMIN — PROPOFOL 50 MG: 10 INJECTION, EMULSION INTRAVENOUS at 14:53

## 2022-01-01 RX ADMIN — MORPHINE SULFATE 7.5 MG: 15 TABLET ORAL at 05:34

## 2022-01-01 RX ADMIN — HEPARIN SODIUM 5000 UNITS: 5000 INJECTION INTRAVENOUS; SUBCUTANEOUS at 13:42

## 2022-01-01 RX ADMIN — ASPIRIN 81 MG 81 MG: 81 TABLET ORAL at 08:49

## 2022-01-01 RX ADMIN — THIAMINE HYDROCHLORIDE 200 MG: 100 INJECTION, SOLUTION INTRAMUSCULAR; INTRAVENOUS at 08:24

## 2022-01-01 RX ADMIN — PROPOFOL 50 MG: 10 INJECTION, EMULSION INTRAVENOUS at 15:04

## 2022-01-01 RX ADMIN — CEFEPIME 1 G: 1 INJECTION, POWDER, FOR SOLUTION INTRAMUSCULAR; INTRAVENOUS at 20:37

## 2022-01-01 RX ADMIN — Medication 0.5 MG: at 22:01

## 2022-01-01 RX ADMIN — LEVOFLOXACIN 500 MG: 5 INJECTION, SOLUTION INTRAVENOUS at 17:16

## 2022-01-01 RX ADMIN — Medication 2 UNITS: at 16:56

## 2022-01-01 RX ADMIN — SODIUM CHLORIDE, PRESERVATIVE FREE 20 MG: 5 INJECTION INTRAVENOUS at 10:19

## 2022-01-01 RX ADMIN — EPOETIN ALFA-EPBX 8000 UNITS: 4000 INJECTION, SOLUTION INTRAVENOUS; SUBCUTANEOUS at 20:36

## 2022-01-01 RX ADMIN — DULOXETINE HYDROCHLORIDE 20 MG: 20 CAPSULE, DELAYED RELEASE ORAL at 08:34

## 2022-01-01 RX ADMIN — SODIUM CHLORIDE, PRESERVATIVE FREE 20 MG: 5 INJECTION INTRAVENOUS at 08:24

## 2022-01-01 RX ADMIN — MIDODRINE HYDROCHLORIDE 10 MG: 5 TABLET ORAL at 17:45

## 2022-01-01 RX ADMIN — DULOXETINE HYDROCHLORIDE 20 MG: 20 CAPSULE, DELAYED RELEASE ORAL at 08:06

## 2022-01-01 RX ADMIN — DEXTROSE MONOHYDRATE 10 MCG/MIN: 50 INJECTION, SOLUTION INTRAVENOUS at 22:01

## 2022-01-01 RX ADMIN — ONDANSETRON 4 MG: 2 INJECTION INTRAMUSCULAR; INTRAVENOUS at 08:44

## 2022-01-01 RX ADMIN — MORPHINE SULFATE 2 MG: 2 INJECTION, SOLUTION INTRAMUSCULAR; INTRAVENOUS at 00:09

## 2022-01-01 RX ADMIN — Medication 2 UNITS: at 22:16

## 2022-01-01 RX ADMIN — ATROPINE SULFATE 1 MG: 1 INJECTION, SOLUTION INTRAMUSCULAR; INTRAVENOUS; SUBCUTANEOUS at 17:26

## 2022-01-01 RX ADMIN — ACETAMINOPHEN 650 MG: 325 TABLET, FILM COATED ORAL at 03:41

## 2022-01-01 RX ADMIN — MIDODRINE HYDROCHLORIDE 10 MG: 5 TABLET ORAL at 17:17

## 2022-01-01 RX ADMIN — SODIUM CHLORIDE, PRESERVATIVE FREE 10 ML: 5 INJECTION INTRAVENOUS at 06:00

## 2022-01-01 RX ADMIN — CALCIUM GLUCONATE 1000 MG: 20 INJECTION, SOLUTION INTRAVENOUS at 07:33

## 2022-01-01 RX ADMIN — SODIUM CHLORIDE, PRESERVATIVE FREE 10 ML: 5 INJECTION INTRAVENOUS at 23:30

## 2022-01-01 RX ADMIN — Medication 0.5 MG: at 18:20

## 2022-01-01 RX ADMIN — Medication 200 MCG: at 15:11

## 2022-01-01 RX ADMIN — Medication 1 MG: at 17:25

## 2022-01-01 RX ADMIN — SODIUM BICARBONATE 50 MEQ: 84 INJECTION, SOLUTION INTRAVENOUS at 07:35

## 2022-01-01 RX ADMIN — MIDODRINE HYDROCHLORIDE 10 MG: 5 TABLET ORAL at 07:35

## 2022-01-01 RX ADMIN — CLOPIDOGREL BISULFATE 75 MG: 75 TABLET ORAL at 08:24

## 2022-01-01 RX ADMIN — DEXTROSE MONOHYDRATE 14 MCG/MIN: 50 INJECTION, SOLUTION INTRAVENOUS at 08:53

## 2022-01-01 RX ADMIN — CEFEPIME 1 G: 1 INJECTION, POWDER, FOR SOLUTION INTRAMUSCULAR; INTRAVENOUS at 20:54

## 2022-01-01 RX ADMIN — SODIUM CHLORIDE, PRESERVATIVE FREE 10 ML: 5 INJECTION INTRAVENOUS at 21:12

## 2022-01-01 RX ADMIN — DEXTROSE MONOHYDRATE 4 MCG/MIN: 50 INJECTION, SOLUTION INTRAVENOUS at 22:26

## 2022-01-01 RX ADMIN — DEXTROSE MONOHYDRATE 12 MCG/MIN: 50 INJECTION, SOLUTION INTRAVENOUS at 08:05

## 2022-01-01 RX ADMIN — SODIUM CHLORIDE 250 ML: 9 INJECTION, SOLUTION INTRAVENOUS at 14:23

## 2022-01-01 RX ADMIN — DULOXETINE HYDROCHLORIDE 20 MG: 20 CAPSULE, DELAYED RELEASE ORAL at 08:57

## 2022-01-01 RX ADMIN — ASPIRIN 81 MG 81 MG: 81 TABLET ORAL at 10:18

## 2022-01-01 RX ADMIN — DIPHENHYDRAMINE HYDROCHLORIDE 25 MG: 50 INJECTION, SOLUTION INTRAMUSCULAR; INTRAVENOUS at 00:36

## 2022-01-01 RX ADMIN — SODIUM CHLORIDE, PRESERVATIVE FREE 10 ML: 5 INJECTION INTRAVENOUS at 06:58

## 2022-01-01 RX ADMIN — SODIUM CHLORIDE, PRESERVATIVE FREE 10 ML: 5 INJECTION INTRAVENOUS at 06:14

## 2022-01-01 RX ADMIN — LORAZEPAM 1 MG: 2 INJECTION INTRAMUSCULAR; INTRAVENOUS at 20:05

## 2022-01-01 RX ADMIN — HEPARIN SODIUM 5000 UNITS: 5000 INJECTION INTRAVENOUS; SUBCUTANEOUS at 06:18

## 2022-01-01 RX ADMIN — SODIUM CHLORIDE, PRESERVATIVE FREE 10 ML: 5 INJECTION INTRAVENOUS at 13:11

## 2022-01-01 RX ADMIN — SODIUM CHLORIDE, PRESERVATIVE FREE 10 ML: 5 INJECTION INTRAVENOUS at 06:37

## 2022-01-01 RX ADMIN — SODIUM CHLORIDE, PRESERVATIVE FREE 20 MG: 5 INJECTION INTRAVENOUS at 08:06

## 2022-01-01 RX ADMIN — DIPHENHYDRAMINE HYDROCHLORIDE 25 MG: 25 CAPSULE ORAL at 20:28

## 2022-01-01 RX ADMIN — FAMOTIDINE 20 MG: 20 TABLET ORAL at 14:22

## 2022-01-01 RX ADMIN — EPOETIN ALFA-EPBX 8000 UNITS: 4000 INJECTION, SOLUTION INTRAVENOUS; SUBCUTANEOUS at 20:55

## 2022-01-01 RX ADMIN — MORPHINE SULFATE 2 MG: 2 INJECTION, SOLUTION INTRAMUSCULAR; INTRAVENOUS at 17:56

## 2022-01-01 RX ADMIN — MIDODRINE HYDROCHLORIDE 10 MG: 5 TABLET ORAL at 19:56

## 2022-01-01 RX ADMIN — CEFEPIME 1 G: 1 INJECTION, POWDER, FOR SOLUTION INTRAMUSCULAR; INTRAVENOUS at 20:44

## 2022-01-01 RX ADMIN — DIPHENHYDRAMINE HYDROCHLORIDE 25 MG: 25 CAPSULE ORAL at 11:29

## 2022-01-01 RX ADMIN — CEFPODOXIME PROXETIL 400 MG: 200 TABLET, FILM COATED ORAL at 15:44

## 2022-01-01 RX ADMIN — Medication 4 UNITS: at 11:52

## 2022-01-01 NOTE — ROUTINE PROCESS
Bedside and Verbal shift change report given to Radha Rankin RN (oncoming nurse) by Clark Brunson (offgoing nurse). Report included the following information SBAR, Kardex and MAR. Statement Selected

## 2022-01-04 ENCOUNTER — HOME CARE VISIT (OUTPATIENT)
Dept: HOME HEALTH SERVICES | Facility: HOME HEALTH | Age: 77
End: 2022-01-04
Payer: MEDICARE

## 2022-01-05 ENCOUNTER — OFFICE VISIT (OUTPATIENT)
Dept: ORTHOPEDIC SURGERY | Age: 77
End: 2022-01-05
Payer: MEDICARE

## 2022-01-05 VITALS
HEIGHT: 62 IN | HEART RATE: 70 BPM | TEMPERATURE: 97.5 F | WEIGHT: 217.8 LBS | OXYGEN SATURATION: 95 % | BODY MASS INDEX: 40.08 KG/M2

## 2022-01-05 DIAGNOSIS — Z96.659 AFTERCARE FOLLOWING KNEE JOINT REPLACEMENT SURGERY, UNSPECIFIED LATERALITY: ICD-10-CM

## 2022-01-05 DIAGNOSIS — M25.562 CHRONIC PAIN OF LEFT KNEE: ICD-10-CM

## 2022-01-05 DIAGNOSIS — Z47.1 AFTERCARE FOLLOWING KNEE JOINT REPLACEMENT SURGERY, UNSPECIFIED LATERALITY: ICD-10-CM

## 2022-01-05 DIAGNOSIS — G89.29 CHRONIC PAIN OF LEFT KNEE: ICD-10-CM

## 2022-01-05 DIAGNOSIS — M25.562 LEFT KNEE PAIN, UNSPECIFIED CHRONICITY: ICD-10-CM

## 2022-01-05 DIAGNOSIS — S32.82XA MULTIPLE CLOSED FRACTURES OF PELVIS WITHOUT DISRUPTION OF PELVIC RING, INITIAL ENCOUNTER (HCC): Primary | ICD-10-CM

## 2022-01-05 DIAGNOSIS — M25.552 LEFT HIP PAIN: ICD-10-CM

## 2022-01-05 PROCEDURE — 72170 X-RAY EXAM OF PELVIS: CPT | Performed by: PHYSICIAN ASSISTANT

## 2022-01-05 PROCEDURE — 73560 X-RAY EXAM OF KNEE 1 OR 2: CPT | Performed by: PHYSICIAN ASSISTANT

## 2022-01-05 PROCEDURE — 99024 POSTOP FOLLOW-UP VISIT: CPT | Performed by: PHYSICIAN ASSISTANT

## 2022-01-05 NOTE — PROGRESS NOTES
Patient: Nanda Barnard                MRN: [id number]       SSN: [id number]  YOB: 1945        AGE: 64 y.o. SEX: female          PCP: Nurys Beebe MD  01/05/22    Chief Complaint   Patient presents with    Hip Pain     f/u left hip FX       1/5/2022: Patient returns the office for follow-up and reimaging of her AP pelvis secondary to a fall which resulted in a left superior and inferior pubic rami fracture. Generally she is doing well. She has been continuing physical therapy with 75% weightbearing of her left lower extremity using a 4 post rolling walker. She has a history of over 10 years ago bilateral knee replacements and has started to have discomfort in the left knee particularly when she rises from a seated position to begin motion. She has no locking or give way. When she fell she may have struck the left knee as well but no previous images were obtained nor exam performed. Pain in the left knee dull aching characteristic when occurring intermittent with a pain rating of 4-5 on a 10 point scale. Start of pain is present occasionally with pain levels up in the 6-7 on a 10 point scale that resolves to lessen over the 30 seconds after movement is initiated. No locking or give way reported. HISTORY:  Nanda Barnard is a 64 y.o. female presents to the office with her caregiver status post fall at home resulting in pelvic fractures on the left. She is currently per spitting and in-home physical therapy. She is about 50% weightbearing of her left lower extremity. Generally she reports doing well her pain is improved since the initial fall. She denies any bowel or bladder incontinence. She does have a ureter stenting that was performed many years ago and has retained stent material.    No blood in her urine or stool status post fall.       Pain Assessment  1/5/2022   Location of Pain Hip   Location Modifiers Left   Severity of Pain 6   Quality of Pain Aching   Duration of Pain Persistent   Frequency of Pain Constant   Aggravating Factors Other (Comment)   Aggravating Factors Comment with sitting   Limiting Behavior Yes   Relieving Factors NSAID;Heat   Relieving Factors Comment -   Result of Injury Yes   Work-Related Injury No   Type of Injury Fall           Lab Results   Component Value Date/Time    Hemoglobin A1c 4.6 10/08/2021 03:50 PM     Weight Metrics 1/5/2022 12/9/2021 11/30/2021 11/19/2021 11/19/2021 11/12/2021 11/7/2021   Weight 217 lb 12.8 oz - 209 lb 8 oz - 209 lb 1.6 oz - 205 lb 0.4 oz   BMI 39.84 kg/m2 38.32 kg/m2 - 38.32 kg/m2 - 38.24 kg/m2 37.5 kg/m2            Problem List Items Addressed This Visit        Orthopedic Problems    Multiple closed pelvic fractures without disruption of pelvic ring ([address] - Primary    Relevant Orders    POC XRAY, PELVIS; 1 OR 2 VIEWS (Completed)      Other Visit Diagnoses     Left knee pain, unspecified chronicity        Relevant Orders    AMB POC XRAY, KNEE; 1/2 VIEWS (Completed)    Left hip pain        Chronic pain of left knee        Aftercare following knee joint replacement surgery, unspecified laterality              PAST MEDICAL HISTORY:   Past Medical History:   Diagnosis Date    Anemia in end-stage renal disease ([address]     Anticoagulated by anticoagulation treatment     On Apixaban    Chronic hypotension     On Midodrine    Chronic pain     Closed fracture of left inferior pubic ramus, with routine healing, subsequent encounter 11/12/2021    Closed fracture of superior pubic ramus, left, with routine healing, subsequent encounter 11/12/2021    Closed nondisplaced fracture of anterior wall of left acetabulum with routine healing 11/12/2021    Depression     End-stage renal disease on hemodialysis ([address]     HD at [facility] on [facility] on MWF.  Tel # [phone]    Gastroesophageal reflux disease     Glaucoma     History of acute pyelonephritis 2/20/2020    History of hydronephrosis 10/5/2021    History of infection with vancomycin resistant Enterococcus (VRE) 10/8/2021    Urine culture (collected 10/8/2021, resulted 10/14/2021) yielded growth of >100,000 colonies/ml of Enterococcus faecalis RESISTANT to Ciprofloxacin, Levofloxacin, Tetracycline and Vancomycin    History of kidney stones     History of recurrent urinary tract infection     History of sepsis 6/18/2021    History of septic shock 10/8/2021    History of urethral stricture     History of urinary tract infection 10/8/2021    Urine culture (collected 10/8/2021, resulted 10/14/2021) yielded growth of >100,000 colonies/ml of Enterococcus faecalis RESISTANT to Ciprofloxacin, Levofloxacin, Tetracycline and Vancomycin    Hyperlipidemia     Hyperphosphatemia 11/14/2021    Hypothyroidism     Lung mass     Mononeuropathy     Involving ring finger of left hand    Need for prophylactic isolation 10/8/2021    Urine culture (collected 10/8/2021, resulted 10/14/2021) yielded growth of >100,000 colonies/ml of Enterococcus faecalis RESISTANT to Ciprofloxacin, Levofloxacin, Tetracycline and Vancomycin    Paroxysmal atrial fibrillation (HCC)     Secondary hyperparathyroidism of renal origin ([facility]     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 ([facility]     HbA1c (10/8/2021) = 4.6    Uric acid nephrolithiasis     Urinary incontinence        PAST SURGICAL HISTORY:   Past Surgical History:   Procedure Laterality Date    HX APPENDECTOMY      HX CHOLECYSTECTOMY      HX GASTRIC BYPASS      Gastric stapling    HX KNEE ARTHROSCOPY      HX UROLOGICAL      right PCN placement    HX UROLOGICAL  07/23/2018    RIGHT URETEROSCOPY WITH HOLMIUM LASER    IR EXCHANGE NEPHRO PERC LT SI  2/21/2020    IR EXCHANGE NEPHRO PERC RT SI  4/13/2020    IR EXCHANGE NEPHRO PERC RT SI  7/17/2020    IR NEPHROSTOMY PERC RT PLC CATH  SI  10/14/2020    IR NEPHROURETERAL PERC RT PLC CATH NEW ACCESS  SI  4/30/2020    AK INTRO CATH DIALYSIS CIRCUIT DX ANGRPH FLUOR S&I Left 9/24/2020    FISTULOGRAM LEFT/poss permanent catheter placement performed by Bruce Cheung MD at [facility] CATH LAB    VASCULAR SURGERY PROCEDURE UNLIST      lef AVF       ALLERGIES:   Allergies   Allergen Reactions    Albumin, Human 25 % Itching     Headache - severe migraine like, itchy eyes, runny nose    Ciprofloxacin Hives    Cyclopentolate Unknown (comments)    Iron Sucrose Diarrhea    Statins-Hmg-Coa Reductase Inhibitors Other (comments)     Body ache        CURRENT MEDICATIONS:  A list of medications prior to the time of admission include:  Prior to Admission medications    Medication Sig Start Date End Date Taking? Authorizing Provider   melatonin 5 mg tablet Take 5 mg by mouth nightly. Provider, Historical   HYDROmorphone (DILAUDID) 2 mg tablet Take 1 mg by mouth every eight (8) hours as needed for Pain. Take 1/2 tablet by mouth every 8 hours as needed for pain level of 5 out of 10 or greater    Provider, Historical   allopurinoL (ZYLOPRIM) 100 mg tablet Take 1 Tablet by mouth every Monday, Wednesday, Friday. [after hemodialysis]  Indications: treatment to prevent acute gout attack 12/3/21   Author MD Juana   amiodarone (CORDARONE) 200 mg tablet Take 1 Tablet by mouth daily. Indications: prevention of recurrent atrial fibrillation 12/3/21   Author MD Juana   sevelamer carbonate (RENVELA) 800 mg tab tab Take 2 Tablets by mouth three (3) times daily (with meals). Indications: renal osteodystrophy with hyperphosphatemia 12/3/21   Author MD Juana   sodium zirconium cyclosilicate [facility]) 5 gram powder packet Take 1 Packet by mouth daily. Indications: high levels of potassium in the blood 12/3/21   Author MD uJana   acetaminophen (Tylenol Extra Strength) 500 mg tablet Take 1 Tablet by mouth every six (6) hours as needed for Pain.  11/7/21   Lilibeth Murray MD   lidocaine (LIDODERM) 5 % Apply patch to the affected area for 12 hours a day and remove for 12 hours a day. 11/7/21   Osvaldo Whelan MD   gabapentin (NEURONTIN) 100 mg capsule Take 2 Capsules by mouth Three (3) times a week. Max Daily Amount: 200 mg. Take 2 capsules by mouth 3 times a week as needed for pain post dialysis. Indications: concern for back pain post dialysis 10/15/21   Demi Woody MD   apixaban (ELIQUIS) 5 mg tablet Take 1 Tablet by mouth two (2) times a day. 10/14/21   Britany Guillen MD   meclizine (ANTIVERT) 25 mg tablet Take 25 mg by mouth three (3) times daily as needed for Dizziness. 3/3/21   Provider, Historical   methoxy peg-epoetin beta [facility] INJECTION) 30 mcg. 9/20/21 9/19/22  Provider, Historical   DULoxetine (Cymbalta) 20 mg capsule Take 20 mg by mouth daily. Provider, Historical   estradioL (Estrace) 0.01 % (0.1 mg/gram) vaginal cream Apply a fingertip amount around the urethra three times a week. 9/30/20   Pedro Cordero MD   biotin 1,000 mcg chew Take 1 Tab by mouth daily. Provider, Historical   cyanocobalamin 1,000 mcg tablet Take 1,000 mcg by mouth daily. Provider, Historical   lactobacillus sp. 50 billion cpu (BIO-K PLUS) 50 billion cell -375 mg cap capsule Take 1 Cap by mouth daily. 2/25/20   Tosha Saxena MD   ascorbic acid, vitamin C, (VITAMIN C) 500 mg tablet Take 500 mg by mouth daily. Lexus Hogan MD   calcitRIOL (ROCALTROL) 0.25 mcg capsule Take 0.25 mcg by mouth daily. Lexus Hogan MD   cholecalciferol (VITAMIN D3) (2,000 UNITS /50 MCG) cap capsule Take 2,000 Units by mouth two (2) times a day. Take two tabs a total of 4000 units    Lexus Hogan MD   latanoprost (XALATAN) 0.005 % ophthalmic solution Administer 1 Drop to both eyes nightly. One drop at bedtime    Lexus Hogan MD   levothyroxine (SYNTHROID) 125 mcg tablet Take 125 mcg by mouth Daily (before breakfast). Lexus Hogan MD   omeprazole (PRILOSEC) 20 mg capsule Take 20 mg by mouth daily.     Lexus Hogan MD   ondansetron (ZOFRAN ODT) 4 mg disintegrating tablet Take 4 mg by mouth every eight (8) hours as needed for Nausea or Vomiting. Lexus Hogan MD   vit B Cmplx 3-FA-Vit C-Biotin (NEPHRO HOWIE RX) 1- mg-mg-mcg tablet Take 1 Tab by mouth daily. Lexus Hogan MD       FAMILY HISTORY:   Family History   Problem Relation Age of Onset    Heart Surgery Sister        SOCIAL HISTORY:   Social History     Socioeconomic History    Marital status: SINGLE   Tobacco Use    Smoking status: Never Smoker    Smokeless tobacco: Never Used   Vaping Use    Vaping Use: Never used   Substance and Sexual Activity    Alcohol use: Never    Drug use: Never       ROS:No CP, No SOB, No fever/chills nor night sweats. No headaches, vision abnormalities to include double and oral loss of vision. No hearing abnormalities. Musculoskeletal pain per HPI. Pain is exacerbated positionally. Pt denies h/o spinal surgery, injections, or PT/chiropractor. Self treated with less than adequate relief on oral antiinflammatories. . Pt denies change in bowel or bladder habits. Pt denies fever, weight loss, or skin changes. PHYSICAL EXAM:    Visit Vitals  Pulse 70   Temp 97.5 °F (36.4 °C) (Temporal)   Ht 5' 2\" (1.575 m)   Wt 217 lb 12.8 oz (98.8 kg)   SpO2 95%   BMI 39.84 kg/m²       Constitutional: Appears well-developed and well-nourished. No distress. Sitting comfortably in the exam room in a wheelchair. , interacting with conversation with pleasant affect. . No focal neurological deficit noted. No facial droop, slurred speech, or evidence of altered mentation noted on exam.   Skin: Skin over the head, neck, bilateral limbs, and trunk is warm and dry. No rash or erythema noted. Cranial Nerves II-XII grossly intact  HENT: NC/AT. Normal symmetry, bulk and tone of facial and neck musculature. Trachea midline. No discernible thyromegaly or masses. No involuntary movements. Lymphatic: No preauricular, submandibuar, anterior or posterior cervical lymphadenopathy.    Psychiatric: The patient is awake, alert, and oriented to person, place and time. Behavior is normal. Thought content normal.   Cardiovascular: No clubbing, cyanosis. No edema bilateral lower extremities. Pulmonary: No tripoding nor accessory muscle recruitment. Breathing normally, no distress, no audible wheezing. Distal cap refill intact at 2/2 Tree UE / LE. Neuro intact Tree UE/LE to noxious stimuli        Ortho Specific exam:      Patient seated at bedside  left knee hip flexor strength guarded reproducing pain to the left pelvis noted at 4 -/5. Passive internal or external rotation of the hip left at 8 and 12 degrees reproduce pain in the left deep pelvis. Abduction testing weak against resistance at 3+/5 and abduction motor strength testing 3/5. Left knee range of motion 105 degrees -5 with a well-healed midline incision nontender with no evidence of warmth, erythema, edema, or ecchymosis. No effusions. Surgical site measures craniocaudal [id number] 21 [facility] and a left hip reveals superior and inferior left ramus fractures with early healing identified. No soft tissue ossifications identified. No other fracture formed lesions or masses noted. X-ray 2 view left knee reveals total joint prosthesis intact with anatomical alignment there is questionable loosening of the tibial component. Femoral component of films were affixed. No acute fractures or dislocations noted. No soft tissue ossifications noted. IMPRESSION:      ICD-10-CM ICD-9-CM    1. Multiple closed fractures of pelvis without disruption of pelvic ring, initial encounter ([facility]  S32.82XA 808.44 POC XRAY, PELVIS; 1 OR 2 VIEWS   2. Left knee pain, unspecified chronicity  M25.562 719.46 AMB POC XRAY, KNEE; 1/2 VIEWS   3. Left hip pain  M25.552 719.45    4. Chronic pain of left knee  M25.562 719.46     G89.29 338.29    5.  Aftercare following knee joint replacement surgery, unspecified laterality Z47.1 V54.81     Z96.659 V43.65         PLAN: With suspicion for loosening of her left knee tibial component a bone scan nuclear is being ordered. Patient may advance to weightbearing as tolerated left lower extremity. Continue PT. X-rays reviewed today copies provided all of her and her caregivers questions were answered to their satisfaction. Follow-up after bone scan. Additionally today we discussed the diagnosis of obesity and the importance of weight management for both cardiovascular health. The patient was recommended to decrease carbohydrate and sugar intake. Patient recommended a formal dietary consult which they will consider and return a call to our office. In light of the patient's osteoarthritic findings I am making a recommendation for aerobic exercise to include but not limited to stationary bicycle, elliptical, therapeutic walking with good shoes and or swimming. Patient should avoid any running or jumping. If using the treadmill then recommendation for no elevation and no running or jogging. No Narcotic indicated today. Patient given pain medication for short term acute pain relief. Goal is to treat patient according to above plan and to ultimately have patient off all pain medications once appropriate. If chronic pain management is required beyond what is expected for current orthopedic problem, will refer patient to pain management.  was reviewed and will be reviewed with every medication refill request.         Patient provided a reminder for a \"due or due soon\" health maintenance. I have asked the patient to schedule an appointment with their primary care provider for follow-up on general health maintenance concerns. Today all the patient's questions were answered to their satisfaction. Copies of x-rays reviewed if obtained this visit, and provided to patient.           Dictation disclaimer:  Please note that this dictation was completed with [employer], the computer voice recognition software. Quite often unanticipated grammatical, syntax, homophones, and other interpretive errors are inadvertently transcribed by the computer software. Please disregard these errors. Please excuse any errors that have escaped final proofreading. Bernard GRIGGS, APC, MPAS, PA-C  [facility]

## 2022-01-06 ENCOUNTER — HOME CARE VISIT (OUTPATIENT)
Dept: SCHEDULING | Facility: HOME HEALTH | Age: 77
End: 2022-01-06
Payer: MEDICARE

## 2022-01-06 DIAGNOSIS — Z47.1 AFTERCARE FOLLOWING KNEE JOINT REPLACEMENT SURGERY, UNSPECIFIED LATERALITY: ICD-10-CM

## 2022-01-06 DIAGNOSIS — M25.562 CHRONIC PAIN OF LEFT KNEE: ICD-10-CM

## 2022-01-06 DIAGNOSIS — M25.562 LEFT KNEE PAIN, UNSPECIFIED CHRONICITY: ICD-10-CM

## 2022-01-06 DIAGNOSIS — G89.29 CHRONIC PAIN OF LEFT KNEE: ICD-10-CM

## 2022-01-06 DIAGNOSIS — M25.552 LEFT HIP PAIN: ICD-10-CM

## 2022-01-06 DIAGNOSIS — Z96.659 AFTERCARE FOLLOWING KNEE JOINT REPLACEMENT SURGERY, UNSPECIFIED LATERALITY: ICD-10-CM

## 2022-01-06 PROCEDURE — G0152 HHCP-SERV OF OT,EA 15 MIN: HCPCS

## 2022-01-06 NOTE — Clinical Note
Ms. Vanessa Stephens was seen by skilled OT for 4 visits s/p recovery from diagnosis of Encounter for an Fracture of superior rim of left pubis  subsequent encounter for fracture with routine healing . Skilled OT goals were to maximize her safety and participation with self care, balance retraining and functional mobility in her home setting. Patient has been educated on energy conservation strategies to reduce SOB and level of exertion with I/ADL tasks. Education has also been provided to the pt for Purse-lip breathing techniques to prevent exertion when performing daily tasks I/ADLS. Patient has met all goals on OT POC addressing ADLS . Pt is able to independently identify up to 3 energy conservation strategies (set clothing out night before, sit during tasks, perform shower on days with no other appointments or activities scheduled, etc.)  to improve safety with ADL participation. Pt will be able to perform lower body dressing tasks with SUP while maintaining WB precautions in 3/3 trials . Patient has met goal on OT POC addressing safety. Pt is using the tub bench for showering to reduce falls and her son brought one grab bar for her to hold on to while standing from toilet in bathroom. Therapist suggests continued use of HEP for continued BUE strength and endurance to perform house-hold tasks in home-setting She has reached maximal potential wish skilled OT at this time. No further skilled OT is indicated at this time. MD office notified.  Thank you for your Referral!!!

## 2022-01-08 ENCOUNTER — HOME CARE VISIT (OUTPATIENT)
Dept: SCHEDULING | Facility: HOME HEALTH | Age: 77
End: 2022-01-08
Payer: MEDICARE

## 2022-01-08 VITALS
OXYGEN SATURATION: 98 % | RESPIRATION RATE: 16 BRPM | SYSTOLIC BLOOD PRESSURE: 130 MMHG | HEART RATE: 81 BPM | DIASTOLIC BLOOD PRESSURE: 80 MMHG | TEMPERATURE: 97.3 F

## 2022-01-08 PROCEDURE — G0151 HHCP-SERV OF PT,EA 15 MIN: HCPCS

## 2022-01-08 NOTE — HOME HEALTH
Caregiver involvement:  Mrs. Opal Lopes currently  in an two -story home with her son and daughter in law . The patients son provides daily emotional support and assistance with self care and mobility. Medications reconciled and all medications are available in the home this visit. The following education was provided regarding medications, medication interactions, and look a like medications: Continue as directed by MD.  Medications  are effective at this time. Home health supplies by type and quantity ordered/delivered this visit include: n/a    Patient education provided this visit:  Educated on importance of performing BUE exercise daily for continued strength and endurance. Educated client on the of continue use of energy conservation and purse-lip breathing to prevent exertion when engaging in daily tasks living . Progress toward goals:     Patient has met all goals on OT POC addressing ADLS . Pt able to independently identify up to 3 energy conservation strategies (set clothing out night before, sit during tasks, perform shower on days with no other appointments or activities scheduled, etc.)  to improve safety with ADL participation. Pt will be able to perform lower body dressing tasks with SUP while maintaining WB precautions in 3/3 trials . Patient has met goal on OT POC addressing safety. Pt is using the tub bench for showering to reduce falls and her son brought one grab bar for her to hold on to while standing from [facility] in bathroom. Home exercise program: Continue with BUE HEP addressing BUE strength and endurce. Pt performed  Cardiac Rehab exercises(Shrug Shoulders, Tuck chin down to chest, Tilt head ledt and right, Turn head left and right(Hold for 5 secs),  Holding arms and turn palms up, Biceps curls, punch arms to front alternating the left and right, Arm curls, left breast stroke BUE arm movement, Arm down at side and swing forward and backwards,Arm raise to shoulder height, Pull right hand from left knee and alternating movement( Lawnmover), hands at sides to opposite shoulder (Choop wood) 10x each. 3 times each. While Seated. Continued need for the following skills: Nursing and Physical Therapy    The following discharge planning was discussed with the pt/caregiver: SERENA OT. Pt has reached maximal potential with self care and functional mobility in her home setting.   Caregiver/spouse

## 2022-01-09 ENCOUNTER — HOME CARE VISIT (OUTPATIENT)
Dept: HOME HEALTH SERVICES | Facility: HOME HEALTH | Age: 77
End: 2022-01-09
Payer: MEDICARE

## 2022-01-20 ENCOUNTER — HOSPITAL ENCOUNTER (OUTPATIENT)
Dept: NUCLEAR MEDICINE | Age: 77
Discharge: HOME | End: 2022-01-20
Attending: PHYSICIAN ASSISTANT
Payer: MEDICARE

## 2022-01-20 PROCEDURE — 78315 BONE IMAGING 3 PHASE: CPT

## 2022-01-26 ENCOUNTER — TRANSCRIBE ORDER (OUTPATIENT)
Dept: SCHEDULING | Age: 77
End: 2022-01-26

## 2022-01-26 DIAGNOSIS — S32.9XXA PELVIC FRACTURE (HCC): Primary | ICD-10-CM

## 2022-02-05 ENCOUNTER — TRANSCRIBE ORDER (OUTPATIENT)
Dept: SCHEDULING | Age: 77
End: 2022-02-05

## 2022-02-05 DIAGNOSIS — S32.9XXA PELVIC FRACTURE (HCC): ICD-10-CM

## 2022-02-05 DIAGNOSIS — Z78.0 MENOPAUSE: Primary | ICD-10-CM

## 2022-02-17 ENCOUNTER — OFFICE VISIT (OUTPATIENT)
Dept: ORTHOPEDIC SURGERY | Age: 77
End: 2022-02-17
Payer: MEDICARE

## 2022-02-17 VITALS
HEART RATE: 77 BPM | TEMPERATURE: 96.8 F | HEIGHT: 62 IN | WEIGHT: 255 LBS | BODY MASS INDEX: 46.93 KG/M2 | OXYGEN SATURATION: 97 %

## 2022-02-17 DIAGNOSIS — G89.29 CHRONIC PAIN OF LEFT KNEE: ICD-10-CM

## 2022-02-17 DIAGNOSIS — Z47.1 AFTERCARE FOLLOWING KNEE JOINT REPLACEMENT SURGERY, UNSPECIFIED LATERALITY: ICD-10-CM

## 2022-02-17 DIAGNOSIS — M25.562 LEFT KNEE PAIN, UNSPECIFIED CHRONICITY: ICD-10-CM

## 2022-02-17 DIAGNOSIS — M25.552 LEFT HIP PAIN: ICD-10-CM

## 2022-02-17 DIAGNOSIS — M25.562 CHRONIC PAIN OF LEFT KNEE: ICD-10-CM

## 2022-02-17 DIAGNOSIS — S32.82XA MULTIPLE CLOSED FRACTURES OF PELVIS WITHOUT DISRUPTION OF PELVIC RING, INITIAL ENCOUNTER (HCC): Primary | ICD-10-CM

## 2022-02-17 DIAGNOSIS — Z96.659 AFTERCARE FOLLOWING KNEE JOINT REPLACEMENT SURGERY, UNSPECIFIED LATERALITY: ICD-10-CM

## 2022-02-17 PROCEDURE — 72170 X-RAY EXAM OF PELVIS: CPT | Performed by: PHYSICIAN ASSISTANT

## 2022-02-17 PROCEDURE — 99024 POSTOP FOLLOW-UP VISIT: CPT | Performed by: PHYSICIAN ASSISTANT

## 2022-02-17 NOTE — PROGRESS NOTES
Patient: Jace Jarquin                MRN: [id number]       SSN: [id number]  YOB: 1945        AGE: 64 y.o. SEX: female          PCP: Vianey Webster MD  02/17/22    Chief Complaint   Patient presents with    Knee Pain     Left     2/17/2022: Patient returns the office for reimaging of her AP pelvis secondary to known superior and inferior rami fractures. Generally she is doing well today. She is near her percent weightbearing of her left lower extremity. She also underwent a bone scan to rule out loosening of her left primary total knee replacement. She is not having any pain to her hip pelvis or knee today. She does use a 4 post rolling walker with brakes and seat for ambulation assistance. She has exercises at home from physical therapy that she has been performing on a daily basis. 1/5/2022: Patient returns the office for follow-up and reimaging of her AP pelvis secondary to a fall which resulted in a left superior and inferior pubic rami fracture. Generally she is doing well. She has been continuing physical therapy with 75% weightbearing of her left lower extremity using a 4 post rolling walker. She has a history of over 10 years ago bilateral knee replacements and has started to have discomfort in the left knee particularly when she rises from a seated position to begin motion. She has no locking or give way. When she fell she may have struck the left knee as well but no previous images were obtained nor exam performed. Pain in the left knee dull aching characteristic when occurring intermittent with a pain rating of 4-5 on a 10 point scale. Start of pain is present occasionally with pain levels up in the 6-7 on a 10 point scale that resolves to lessen over the 30 seconds after movement is initiated. No locking or give way reported.         HISTORY:  Jace Jarquin is a 64 y.o. female presents to the office with her caregiver status post fall at home resulting in pelvic fractures on the left. She is currently per spitting and in-home physical therapy. She is about 50% weightbearing of her left lower extremity. Generally she reports doing well her pain is improved since the initial fall. She denies any bowel or bladder incontinence. She does have a ureter stenting that was performed many years ago and has retained stent material.    No blood in her urine or stool status post fall.       Pain Assessment  2/17/2022   Location of Pain Knee   Location Modifiers -   Severity of Pain 0   Quality of Pain Aching   Duration of Pain A few hours   Frequency of Pain Intermittent   Aggravating Factors Walking;Stairs   Aggravating Factors Comment -   Limiting Behavior No   Relieving Factors Rest;Other (Comment)   Relieving Factors Comment Tylenol   Result of Injury Yes   Work-Related Injury No   Type of Injury Fall           Lab Results   Component Value Date/Time    Hemoglobin A1c 4.6 10/08/2021 03:50 PM     Weight Metrics 2/17/2022 1/5/2022 12/9/2021 11/30/2021 11/19/2021 11/19/2021 11/12/2021   Weight 255 lb 217 lb 12.8 oz - 209 lb 8 oz - 209 lb 1.6 oz -   BMI 46.64 kg/m2 39.84 kg/m2 38.32 kg/m2 - 38.32 kg/m2 - 38.24 kg/m2            Problem List Items Addressed This Visit        Orthopedic Problems    Multiple closed pelvic fractures without disruption of pelvic ring (HCC) - Primary    Relevant Orders    POC XRAY, PELVIS; 1 OR 2 VIEWS      Other Visit Diagnoses     Left knee pain, unspecified chronicity        Relevant Orders    POC XRAY, PELVIS; 1 OR 2 VIEWS    Aftercare following knee joint replacement surgery, unspecified laterality        Relevant Orders    POC XRAY, PELVIS; 1 OR 2 VIEWS    Left hip pain        Relevant Orders    POC XRAY, PELVIS; 1 OR 2 VIEWS    Chronic pain of left knee        Relevant Orders    POC XRAY, PELVIS; 1 OR 2 VIEWS          PAST MEDICAL HISTORY:   Past Medical History:   Diagnosis Date    Anemia in end-stage renal disease ([facility] [address]     Anticoagulated by anticoagulation treatment     On Apixaban    Chronic hypotension     On Midodrine    Chronic pain     Closed fracture of left inferior pubic ramus, with routine healing, subsequent encounter 11/12/2021    Closed fracture of superior pubic ramus, left, with routine healing, subsequent encounter 11/12/2021    Closed nondisplaced fracture of anterior wall of left acetabulum with routine healing 11/12/2021    Depression     End-stage renal disease on hemodialysis ([facility] [address]     HD at [facility].  Tel # [phone]    Gastroesophageal reflux disease     Glaucoma     History of acute pyelonephritis 2/20/2020    History of hydronephrosis 10/5/2021    History of infection with vancomycin resistant Enterococcus (VRE) 10/8/2021    Urine culture (collected 10/8/2021, resulted 10/14/2021) yielded growth of >100,000 colonies/ml of Enterococcus faecalis RESISTANT to Ciprofloxacin, Levofloxacin, Tetracycline and Vancomycin    History of kidney stones     History of recurrent urinary tract infection     History of sepsis 6/18/2021    History of septic shock 10/8/2021    History of urethral stricture     History of urinary tract infection 10/8/2021    Urine culture (collected 10/8/2021, resulted 10/14/2021) yielded growth of >100,000 colonies/ml of Enterococcus faecalis RESISTANT to Ciprofloxacin, Levofloxacin, Tetracycline and Vancomycin    Hyperlipidemia     Hyperphosphatemia 11/14/2021    Hypothyroidism     Lung mass     Mononeuropathy     Involving ring finger of left hand    Need for prophylactic isolation 10/8/2021    Urine culture (collected 10/8/2021, resulted 10/14/2021) yielded growth of >100,000 colonies/ml of Enterococcus faecalis RESISTANT to Ciprofloxacin, Levofloxacin, Tetracycline and Vancomycin    Paroxysmal atrial fibrillation (HCC)     Secondary hyperparathyroidism of renal origin ([facility] [address]     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 ([facility] [address]     HbA1c (10/8/2021) = 4.6    Uric acid nephrolithiasis     Urinary incontinence        PAST SURGICAL HISTORY:   Past Surgical History:   Procedure Laterality Date    HX APPENDECTOMY      HX CHOLECYSTECTOMY      HX GASTRIC BYPASS      Gastric stapling    HX KNEE ARTHROSCOPY      HX UROLOGICAL      right PCN placement    HX UROLOGICAL  07/23/2018    RIGHT URETEROSCOPY WITH HOLMIUM LASER    IR EXCHANGE NEPHRO PERC LT SI  2/21/2020    IR EXCHANGE NEPHRO PERC RT SI  4/13/2020    IR EXCHANGE NEPHRO PERC RT SI  7/17/2020    IR NEPHROSTOMY PERC RT PLC CATH  SI  10/14/2020    IR NEPHROURETERAL PERC RT PLC CATH NEW ACCESS  SI  4/30/2020    SC INTRO CATH DIALYSIS CIRCUIT DX ANGRPH FLUOR S&I Left 9/24/2020    FISTULOGRAM LEFT/poss permanent catheter placement performed by Caro Lopes MD at [facility] CATH LAB    VASCULAR SURGERY PROCEDURE UNLIST      lef AVF       ALLERGIES:   Allergies   Allergen Reactions    Albumin, Human 25 % Itching     Headache - severe migraine like, itchy eyes, runny nose    Ciprofloxacin Hives    Cyclopentolate Unknown (comments)    Iron Sucrose Diarrhea    Statins-Hmg-Coa Reductase Inhibitors Other (comments)     Body ache        CURRENT MEDICATIONS:  A list of medications prior to the time of admission include:  Prior to Admission medications    Medication Sig Start Date End Date Taking? Authorizing Provider   melatonin 5 mg tablet Take 5 mg by mouth nightly. Provider, Historical   HYDROmorphone (DILAUDID) 2 mg tablet Take 1 mg by mouth every eight (8) hours as needed for Pain. Take 1/2 tablet by mouth every 8 hours as needed for pain level of 5 out of 10 or greater    Provider, Historical   allopurinoL (ZYLOPRIM) 100 mg tablet Take 1 Tablet by mouth every Monday, Wednesday, Friday.  [after hemodialysis]  Indications: treatment to prevent acute gout attack 12/3/21   Edward Tavera MD   amiodarone (CORDARONE) 200 mg tablet Take 1 Tablet by mouth daily. Indications: prevention of recurrent atrial fibrillation 12/3/21   Blanca Gómez MD   sevelamer carbonate (RENVELA) 800 mg tab tab Take 2 Tablets by mouth three (3) times daily (with meals). Indications: renal osteodystrophy with hyperphosphatemia 12/3/21   Blanca Gómez MD   sodium zirconium cyclosilicate [facility]) 5 gram powder packet Take 1 Packet by mouth daily. Indications: high levels of potassium in the blood 12/3/21   Blanca Gómez MD   acetaminophen (Tylenol Extra Strength) 500 mg tablet Take 1 Tablet by mouth every six (6) hours as needed for Pain. 11/7/21   Brandon Murray MD   lidocaine (LIDODERM) 5 % Apply patch to the affected area for 12 hours a day and remove for 12 hours a day. 11/7/21   Juan Aranda MD   gabapentin (NEURONTIN) 100 mg capsule Take 2 Capsules by mouth Three (3) times a week. Max Daily Amount: 200 mg. Take 2 capsules by mouth 3 times a week as needed for pain post dialysis. Indications: concern for back pain post dialysis  Patient taking differently: Take 100 mg by mouth daily. taking 1 pill at night before bed  Indications: concern for back pain post dialysis 10/15/21   Blade Hairston MD   apixaban (ELIQUIS) 5 mg tablet Take 1 Tablet by mouth two (2) times a day. 10/14/21   Bill Guillen MD   meclizine (ANTIVERT) 25 mg tablet Take 25 mg by mouth three (3) times daily as needed for Dizziness. 3/3/21   Provider, Historical   methoxy peg-epoetin beta [facility] INJECTION) 30 mcg. 9/20/21 9/19/22  Provider, Historical   DULoxetine (Cymbalta) 20 mg capsule Take 20 mg by mouth daily. Provider, Historical   estradioL (Estrace) 0.01 % (0.1 mg/gram) vaginal cream Apply a fingertip amount around the urethra three times a week. 9/30/20   Salena Ellis MD   biotin 1,000 mcg chew Take 1 Tab by mouth daily. Provider, Historical   cyanocobalamin 1,000 mcg tablet Take 1,000 mcg by mouth daily.     Provider, Historical   lactobacillus sp. 50 billion cpu (BIO-K PLUS) 50 billion cell -375 mg cap capsule Take 1 Cap by mouth daily. 2/25/20   Jose Soto MD   ascorbic acid, vitamin C, (VITAMIN C) 500 mg tablet Take 500 mg by mouth daily. Lexus Hogan MD   calcitRIOL (ROCALTROL) 0.25 mcg capsule Take 0.25 mcg by mouth daily. Lexus Hogan MD   cholecalciferol (VITAMIN D3) (2,000 UNITS /50 MCG) cap capsule Take 2,000 Units by mouth two (2) times a day. Take two tabs a total of 4000 units    Lexus Hogan MD   latanoprost (XALATAN) 0.005 % ophthalmic solution Administer 1 Drop to both eyes nightly. One drop at bedtime    Lexus Hogan MD   levothyroxine (SYNTHROID) 125 mcg tablet Take 125 mcg by mouth Daily (before breakfast). Lexus Hogan MD   omeprazole (PRILOSEC) 20 mg capsule Take 20 mg by mouth daily. Lexus Hogan MD   ondansetron (ZOFRAN ODT) 4 mg disintegrating tablet Take 4 mg by mouth every eight (8) hours as needed for Nausea or Vomiting. Lexus Hogan MD   vit B Cmplx 3-FA-Vit C-Biotin (NEPHRO HOWIE RX) 1- mg-mg-mcg tablet Take 1 Tab by mouth daily. Lexus Hogan MD       FAMILY HISTORY:   Family History   Problem Relation Age of Onset    Heart Surgery Sister        SOCIAL HISTORY:   Social History     Socioeconomic History    Marital status: SINGLE   Tobacco Use    Smoking status: Never Smoker    Smokeless tobacco: Never Used   Vaping Use    Vaping Use: Never used   Substance and Sexual Activity    Alcohol use: Never    Drug use: Never       ROS:No CP, No SOB, No fever/chills nor night sweats. No headaches, vision abnormalities to include double and oral loss of vision. No hearing abnormalities. Musculoskeletal pain per HPI. Pain is exacerbated positionally. Pt denies h/o spinal surgery, injections, or PT/chiropractor. Self treated with less than adequate relief on oral antiinflammatories. . Pt denies change in bowel or bladder habits. Pt denies fever, weight loss, or skin changes.         PHYSICAL EXAM:    Visit Vitals  Pulse 77   Temp 96.8 °F (36 °C) (Temporal)   Ht 5' 2\" (1.575 m)   Wt 255 lb (115.7 kg)   SpO2 97%   BMI 46.64 kg/m²       Constitutional: Appears well-developed and well-nourished. No distress. Sitting comfortably in the exam room in the chair. , interacting with conversation with pleasant affect. . No focal neurological deficit noted. No facial droop, slurred speech, or evidence of altered mentation noted on exam.   Skin: Skin over the head, neck, bilateral limbs, and trunk is warm and dry. No rash or erythema noted. Cranial Nerves II-XII grossly intact  HENT: NC/AT. Normal symmetry, bulk and tone of facial and neck musculature. Trachea midline. No discernible thyromegaly or masses. No involuntary movements. Lymphatic: No preauricular, submandibuar, anterior or posterior cervical lymphadenopathy. Psychiatric: The patient is awake, alert, and oriented to person, place and time. Behavior is normal. Thought content normal.   Cardiovascular: No clubbing, cyanosis. No edema bilateral lower extremities. Pulmonary: No tripoding nor accessory muscle recruitment. Breathing normally, no distress, no audible wheezing. Distal cap refill intact at 2/2 Tree UE / LE. Neuro intact Tree UE/LE to noxious stimuli        Ortho Specific exam:      Patient seated at bedside  left knee hip flexor strength guarded reproducing pain to the left pelvis noted at 4 -/5. Passive internal or external rotation of the hip left at 8 and 12 degrees reproduce pain in the left deep pelvis. Abduction testing weak against resistance at 3+/5 and abduction motor strength testing 3/5. Left knee range of motion 105 degrees -5 with a well-healed midline incision nontender with no evidence of warmth, erythema, edema, or ecchymosis. No effusions. Surgical site measures craniocaudal [id number] 21 cm. Bone scan as below:    Study Result    Narrative & Impression   BONE SCAN, THREE PHASE     INDICATION: Left knee pain, duration/onset not provided. Patient reports pain  radiates to left hip. History of bilateral knee replacements greater than 10  years ago. Reported pelvic fracture in November 2021.     COMPARISON: No prior bone scan.     REFERENCE: Bilateral hip and knee radiographs performed at an outside facility  12/9/2021 have been uploaded into PACS, the reports of the outside radiographs  are not available at this time.     TECHNIQUE: Following IV administration of 27.5 mCi 99mTc-MDP at the right hand  IV, three phase bone scan imaging of the bilateral knees was performed.     FINDINGS:     Photopenia at the knee bilaterally compatible with metallic arthroplasties.     The angiographic flow phase images appear grossly symmetric without focal  abnormal radiotracer uptake seen.     The immediate \"blood pool\" phase images demonstrate grossly symmetric  physiologic soft tissue activity without significant focal abnormal radiotracer  uptake.     On delayed images there is diffuse mildly increased radiotracer uptake  underlying the knee arthroplasty bilaterally, grossly symmetric, including  distal femur medially and laterally, proximal tibia medially and laterally, and  patella. Findings are grossly symmetric. Activity along the tibial stem  laterally is minimally more conspicuous on the left side compared to the right. Given the mild degree and near symmetry, nonspecific.     IMPRESSION     Mild diffuse periprosthetic activity bilaterally as described, nonspecific.     Dictated by Rashmi Cuellar D.O., radiology resident physician      As attending radiologist I have assessed the study images, and dictated or  reviewed/edited the final report as needed.                [facility] pelvis reveals left superior and inferior pubic rami fractures healing nicely. Bony callus is noted. Alignment is acceptable. No soft tissue ossifications noted. No fracture deformity lesions or masses present.       [facility] [address] [facility] AP pelvis and a left hip reveals superior and inferior left ramus fractures with early healing identified. No soft tissue ossifications identified. No other fracture formed lesions or masses noted. X-ray 2 view left knee reveals total joint prosthesis intact with anatomical alignment there is questionable loosening of the tibial component. Femoral component of films were affixed. No acute fractures or dislocations noted. No soft tissue ossifications noted. IMPRESSION:      ICD-10-CM ICD-9-CM    1. Multiple closed fractures of pelvis without disruption of pelvic ring, initial encounter ([facility] [address]  S32.82XA 808.44 POC XRAY, PELVIS; 1 OR 2 VIEWS   2. Left knee pain, unspecified chronicity  M25.562 719.46 POC XRAY, PELVIS; 1 OR 2 VIEWS   3. Aftercare following knee joint replacement surgery, unspecified laterality  Z47.1 V54.81 POC XRAY, PELVIS; 1 OR 2 VIEWS    Z96.659 V43.65    4. Left hip pain  M25.552 719.45 POC XRAY, PELVIS; 1 OR 2 VIEWS   5. Chronic pain of left knee  M25.562 719.46 POC XRAY, PELVIS; 1 OR 2 VIEWS    G89.29 338.29         PLAN: Patient is progressing nicely with recovers from her pubic rami fracture. As above the bone scan indicates no asymmetry based on radioactive isotope uptake. Therefore there is no evidence of loosening identified. This coincides with patient's improvement in her pain which was noted previous. X-rays reviewed today copies provided all of her and her caregivers questions were answered to their satisfaction. Patient will follow up in 6 weeks for reimaging. Additionally today we discussed the diagnosis of obesity and the importance of weight management for both cardiovascular health. The patient was recommended to decrease carbohydrate and sugar intake. Patient recommended a formal dietary consult which they will consider and return a call to our office.   In light of the patient's osteoarthritic findings I am making a recommendation for aerobic exercise to include but not limited to stationary bicycle, elliptical, therapeutic walking with good shoes and or swimming. Patient should avoid any running or jumping. If using the treadmill then recommendation for no elevation and no running or jogging. No Narcotic indicated today. Patient given pain medication for short term acute pain relief. Goal is to treat patient according to above plan and to ultimately have patient off all pain medications once appropriate. If chronic pain management is required beyond what is expected for current orthopedic problem, will refer patient to pain management.  was reviewed and will be reviewed with every medication refill request.         Patient provided a reminder for a \"due or due soon\" health maintenance. I have asked the patient to schedule an appointment with their primary care provider for follow-up on general health maintenance concerns. Today all the patient's questions were answered to their satisfaction. Copies of x-rays reviewed if obtained this visit, and provided to patient. Dictation disclaimer:  Please note that this dictation was completed with [employer], the computer voice recognition software. Quite often unanticipated grammatical, syntax, homophones, and other interpretive errors are inadvertently transcribed by the computer software. Please disregard these errors. Please excuse any errors that have escaped final proofreading. Rika GRIGGS, APC, MPAS, PA-C  [facility]

## 2022-03-03 ENCOUNTER — APPOINTMENT (OUTPATIENT)
Dept: GENERAL RADIOLOGY | Age: 77
End: 2022-03-03
Attending: STUDENT IN AN ORGANIZED HEALTH CARE EDUCATION/TRAINING PROGRAM
Payer: MEDICARE

## 2022-03-03 ENCOUNTER — APPOINTMENT (OUTPATIENT)
Dept: CT IMAGING | Age: 77
End: 2022-03-03
Attending: STUDENT IN AN ORGANIZED HEALTH CARE EDUCATION/TRAINING PROGRAM
Payer: MEDICARE

## 2022-03-03 ENCOUNTER — HOSPITAL ENCOUNTER (EMERGENCY)
Age: 77
Discharge: HOME | End: 2022-03-03
Attending: STUDENT IN AN ORGANIZED HEALTH CARE EDUCATION/TRAINING PROGRAM
Payer: MEDICARE

## 2022-03-03 VITALS
HEART RATE: 64 BPM | OXYGEN SATURATION: 100 % | TEMPERATURE: 97.9 F | SYSTOLIC BLOOD PRESSURE: 100 MMHG | DIASTOLIC BLOOD PRESSURE: 43 MMHG | RESPIRATION RATE: 16 BRPM

## 2022-03-03 DIAGNOSIS — M25.552 LEFT HIP PAIN: Primary | ICD-10-CM

## 2022-03-03 DIAGNOSIS — Z87.81 HISTORY OF PELVIC FRACTURE: ICD-10-CM

## 2022-03-03 LAB
ALBUMIN SERPL-MCNC: 2.9 G/DL (ref 3.4–5)
ALBUMIN/GLOB SERPL: 0.7 {RATIO} (ref 0.8–1.7)
ALP SERPL-CCNC: 130 U/L (ref 45–117)
ALT SERPL-CCNC: 21 U/L (ref 13–56)
ANION GAP SERPL CALC-SCNC: 10 MMOL/L (ref 3–18)
AST SERPL-CCNC: 15 U/L (ref 10–38)
BASOPHILS # BLD: 0.1 K/UL (ref 0–0.1)
BASOPHILS NFR BLD: 1 % (ref 0–2)
BILIRUB SERPL-MCNC: 0.4 MG/DL (ref 0.2–1)
BUN SERPL-MCNC: 55 MG/DL (ref 7–18)
BUN/CREAT SERPL: 7 (ref 12–20)
CALCIUM SERPL-MCNC: 8.3 MG/DL (ref 8.5–10.1)
CHLORIDE SERPL-SCNC: 109 MMOL/L (ref 100–111)
CO2 SERPL-SCNC: 22 MMOL/L (ref 21–32)
CREAT SERPL-MCNC: 8.15 MG/DL (ref 0.6–1.3)
DIFFERENTIAL METHOD BLD: ABNORMAL
EOSINOPHIL # BLD: 0.1 K/UL (ref 0–0.4)
EOSINOPHIL NFR BLD: 2 % (ref 0–5)
ERYTHROCYTE [DISTWIDTH] IN BLOOD BY AUTOMATED COUNT: 16.4 % (ref 11.6–14.5)
GLOBULIN SER CALC-MCNC: 4.2 G/DL (ref 2–4)
GLUCOSE SERPL-MCNC: 115 MG/DL (ref 74–99)
HCT VFR BLD AUTO: 32.8 % (ref 35–45)
HGB BLD-MCNC: 10.1 G/DL (ref 12–16)
IMM GRANULOCYTES # BLD AUTO: 0.1 K/UL (ref 0–0.04)
IMM GRANULOCYTES NFR BLD AUTO: 1 % (ref 0–0.5)
LYMPHOCYTES # BLD: 1.6 K/UL (ref 0.9–3.6)
LYMPHOCYTES NFR BLD: 25 % (ref 21–52)
MCH RBC QN AUTO: 30 PG (ref 24–34)
MCHC RBC AUTO-ENTMCNC: 30.8 G/DL (ref 31–37)
MCV RBC AUTO: 97.3 FL (ref 78–100)
MONOCYTES # BLD: 0.6 K/UL (ref 0.05–1.2)
MONOCYTES NFR BLD: 9 % (ref 3–10)
NEUTS SEG # BLD: 4 K/UL (ref 1.8–8)
NEUTS SEG NFR BLD: 62 % (ref 40–73)
NRBC # BLD: 0 K/UL (ref 0–0.01)
NRBC BLD-RTO: 0 PER 100 WBC
PLATELET # BLD AUTO: 228 K/UL (ref 135–420)
PMV BLD AUTO: 10 FL (ref 9.2–11.8)
POTASSIUM SERPL-SCNC: 4.8 MMOL/L (ref 3.5–5.5)
PROT SERPL-MCNC: 7.1 G/DL (ref 6.4–8.2)
RBC # BLD AUTO: 3.37 M/UL (ref 4.2–5.3)
SODIUM SERPL-SCNC: 141 MMOL/L (ref 136–145)
WBC # BLD AUTO: 6.5 K/UL (ref 4.6–13.2)

## 2022-03-03 PROCEDURE — 99284 EMERGENCY DEPT VISIT MOD MDM: CPT

## 2022-03-03 PROCEDURE — 73560 X-RAY EXAM OF KNEE 1 OR 2: CPT

## 2022-03-03 PROCEDURE — 73110 X-RAY EXAM OF WRIST: CPT

## 2022-03-03 PROCEDURE — 80053 COMPREHEN METABOLIC PANEL: CPT

## 2022-03-03 PROCEDURE — 96375 TX/PRO/DX INJ NEW DRUG ADDON: CPT

## 2022-03-03 PROCEDURE — 74011250637 HC RX REV CODE- 250/637: Performed by: STUDENT IN AN ORGANIZED HEALTH CARE EDUCATION/TRAINING PROGRAM

## 2022-03-03 PROCEDURE — 72192 CT PELVIS W/O DYE: CPT

## 2022-03-03 PROCEDURE — 96374 THER/PROPH/DIAG INJ IV PUSH: CPT

## 2022-03-03 PROCEDURE — 85025 COMPLETE CBC W/AUTO DIFF WBC: CPT

## 2022-03-03 PROCEDURE — 70450 CT HEAD/BRAIN W/O DYE: CPT

## 2022-03-03 PROCEDURE — 74011250636 HC RX REV CODE- 250/636: Performed by: STUDENT IN AN ORGANIZED HEALTH CARE EDUCATION/TRAINING PROGRAM

## 2022-03-03 PROCEDURE — 96376 TX/PRO/DX INJ SAME DRUG ADON: CPT

## 2022-03-03 PROCEDURE — 73521 X-RAY EXAM HIPS BI 2 VIEWS: CPT

## 2022-03-03 RX ORDER — FENTANYL CITRATE 50 UG/ML
50 INJECTION, SOLUTION INTRAMUSCULAR; INTRAVENOUS
Status: COMPLETED | OUTPATIENT
Start: 2022-03-03 | End: 2022-03-03

## 2022-03-03 RX ORDER — MIDODRINE HYDROCHLORIDE 5 MG/1
10 TABLET ORAL
Status: DISCONTINUED | OUTPATIENT
Start: 2022-03-03 | End: 2022-03-03 | Stop reason: HOSPADM

## 2022-03-03 RX ORDER — TRAMADOL HYDROCHLORIDE 50 MG/1
50 TABLET ORAL
Qty: 12 TABLET | Refills: 0 | Status: SHIPPED | OUTPATIENT
Start: 2022-03-03 | End: 2022-03-06

## 2022-03-03 RX ORDER — ONDANSETRON 2 MG/ML
4 INJECTION INTRAMUSCULAR; INTRAVENOUS
Status: COMPLETED | OUTPATIENT
Start: 2022-03-03 | End: 2022-03-03

## 2022-03-03 RX ORDER — TRAMADOL HYDROCHLORIDE 50 MG/1
50 TABLET ORAL
Qty: 12 TABLET | Refills: 0 | Status: SHIPPED | OUTPATIENT
Start: 2022-03-03 | End: 2022-03-03 | Stop reason: SDUPTHER

## 2022-03-03 RX ADMIN — ONDANSETRON 4 MG: 2 INJECTION INTRAMUSCULAR; INTRAVENOUS at 13:06

## 2022-03-03 RX ADMIN — FENTANYL CITRATE 50 MCG: 50 INJECTION, SOLUTION INTRAMUSCULAR; INTRAVENOUS at 13:07

## 2022-03-03 RX ADMIN — MIDODRINE HYDROCHLORIDE 10 MG: 5 TABLET ORAL at 13:05

## 2022-03-03 RX ADMIN — FENTANYL CITRATE 50 MCG: 50 INJECTION, SOLUTION INTRAMUSCULAR; INTRAVENOUS at 16:48

## 2022-03-03 NOTE — ED NOTES
Dr. Sunshine Chawla new over patient care. Patient was able to ambulate without significant pain or difficulty. Patient states that she would like to be discharged at this time. Will discharge patient home with return precautions and follow-up recommendations. Patient verbalized understanding is without any further questions.

## 2022-03-03 NOTE — ED TRIAGE NOTES
Patient arrives to ED via EMS with report of fall, patient fell forward into entryway and is c/o left knee, hip pain, minor shortening noted on left, +bruising to left knee and left thigh.

## 2022-03-03 NOTE — ED NOTES
Patient now wants to be d/c'd, states she her son is on the way and is able to take care of her and wants to be d/c'd.

## 2022-03-03 NOTE — DISCHARGE INSTRUCTIONS
Please return to the ER with any new or worsening symptoms or any other concerns. Please return to the Emergency Department if you develop a fever, chills, cannot eat or drink due to nausea or vomiting, or if any of your symptoms worsen. Also, It is extremely important that you follow-up with a primary care physician and if you do not have one currently use the contact information provided to obtain an appointment. If none was provided please call the number on the back of your insurance card to locate a Primary care doctor. Many offices have \"cancellation lists\" that you can ask to be placed on; should a patient with an earlier appointment cancel you will be notified to take their place. Please return to the Emergency Room immediately if your symptoms worsen. Please carefully read all discharge instructions    [email or web address]    What are GoodRx coupons? GoodRx coupons will help you pay less than the cash price for your prescription. Jeffrey Rang free to use and are accepted at virtually every [place] pharmacy. Your pharmacist will know how to enter the codes on the coupon to pull up the lowest discount available.

## 2022-03-03 NOTE — ED PROVIDER NOTES
EMERGENCY DEPARTMENT HISTORY AND PHYSICAL EXAM    1:50 PM    Date: 3/3/2022  Patient Name: Nunzio Aschoff    History of Presenting Illness     Chief Complaint   Patient presents with   [facility] Fall       History Provided By: Patient  Location/Duration/Severity/Modifying factors   HPI  Nunzio Aschoff is a 64 y.o. female with a past no history of ESRD on HD, paroxysmal A. fib on Eliquis presenting after a mechanical fall. Patient states she is ambulating at home just prior to arrival and tripped on a piece of carpet and fell forward. She landed on her left knee, did not hit her head or lose consciousness but said that the shock of landing on her left knee radiated forced into her left hip. She is now complaining of left knee swelling, pain and bruising as well as pain in her left hip. She has not ambulated since the fall. Denies any other extremity pain or injury. Unfortunately she fell in November, 2021 and broke her pelvis in 2 locations. This is not surgically repaired, she completed a long course of home health. No additional injuries. Denies chest pain, difficulty breathing or fevers. Last received dialysis monday    PCP: Sandy Rai MD    Current Facility-Administered Medications   Medication Dose Route Frequency Provider Last Rate Last Admin    midodrine (PROAMATINE) tablet 10 mg  10 mg Oral TID WITH MEALS Orestes Garza MD   10 mg at 03/03/22 1305     Current Outpatient Medications   Medication Sig Dispense Refill    traMADoL (Ultram) 50 mg tablet Take 1 Tablet by mouth every six (6) hours as needed for Pain for up to 3 days. Max Daily Amount: 200 mg. 12 Tablet 0    melatonin 5 mg tablet Take 5 mg by mouth nightly.  HYDROmorphone (DILAUDID) 2 mg tablet Take 1 mg by mouth every eight (8) hours as needed for Pain. Take 1/2 tablet by mouth every 8 hours as needed for pain level of 5 out of 10 or greater      allopurinoL (ZYLOPRIM) 100 mg tablet Take 1 Tablet by mouth every Monday, Wednesday, Friday. [after hemodialysis]  Indications: treatment to prevent acute gout attack 12 Tablet 0    amiodarone (CORDARONE) 200 mg tablet Take 1 Tablet by mouth daily. Indications: prevention of recurrent atrial fibrillation 30 Tablet 0    sevelamer carbonate (RENVELA) 800 mg tab tab Take 2 Tablets by mouth three (3) times daily (with meals). Indications: renal osteodystrophy with hyperphosphatemia 180 Tablet 0    sodium zirconium cyclosilicate (LOKELMA) 5 gram powder packet Take 1 Packet by mouth daily. Indications: high levels of potassium in the blood 30 Packet 0    acetaminophen (Tylenol Extra Strength) 500 mg tablet Take 1 Tablet by mouth every six (6) hours as needed for Pain. 30 Tablet 0    lidocaine (LIDODERM) 5 % Apply patch to the affected area for 12 hours a day and remove for 12 hours a day. 1 Each 0    gabapentin (NEURONTIN) 100 mg capsule Take 2 Capsules by mouth Three (3) times a week. Max Daily Amount: 200 mg. Take 2 capsules by mouth 3 times a week as needed for pain post dialysis. Indications: concern for back pain post dialysis (Patient taking differently: Take 100 mg by mouth daily. taking 1 pill at night before bed  Indications: concern for back pain post dialysis) 15 Capsule 0    apixaban (ELIQUIS) 5 mg tablet Take 1 Tablet by mouth two (2) times a day. 60 Tablet 0    meclizine (ANTIVERT) 25 mg tablet Take 25 mg by mouth three (3) times daily as needed for Dizziness.  methoxy peg-epoetin beta (MIRCERA INJECTION) 30 mcg.  DULoxetine (Cymbalta) 20 mg capsule Take 20 mg by mouth daily.  estradioL (Estrace) 0.01 % (0.1 mg/gram) vaginal cream Apply a fingertip amount around the urethra three times a week. 30 g 3    biotin 1,000 mcg chew Take 1 Tab by mouth daily.  cyanocobalamin 1,000 mcg tablet Take 1,000 mcg by mouth daily.  lactobacillus sp. 50 billion cpu (BIO-K PLUS) 50 billion cell -375 mg cap capsule Take 1 Cap by mouth daily.  30 Cap 2    ascorbic acid, vitamin C, (VITAMIN C) 500 mg tablet Take 500 mg by mouth daily.  calcitRIOL (ROCALTROL) 0.25 mcg capsule Take 0.25 mcg by mouth daily.  cholecalciferol (VITAMIN D3) (2,000 UNITS /50 MCG) cap capsule Take 2,000 Units by mouth two (2) times a day. Take two tabs a total of 4000 units      latanoprost (XALATAN) 0.005 % ophthalmic solution Administer 1 Drop to both eyes nightly. One drop at bedtime      levothyroxine (SYNTHROID) 125 mcg tablet Take 125 mcg by mouth Daily (before breakfast).  omeprazole (PRILOSEC) 20 mg capsule Take 20 mg by mouth daily.  ondansetron (ZOFRAN ODT) 4 mg disintegrating tablet Take 4 mg by mouth every eight (8) hours as needed for Nausea or Vomiting.  vit B Cmplx 3-FA-Vit C-Biotin (NEPHRO HOWIE RX) 1- mg-mg-mcg tablet Take 1 Tab by mouth daily. Past History     Past Medical History:  Past Medical History:   Diagnosis Date    Anemia in end-stage renal disease ([facility] [address]     Anticoagulated by anticoagulation treatment     On Apixaban    Chronic hypotension     On Midodrine    Chronic pain     Closed fracture of left inferior pubic ramus, with routine healing, subsequent encounter 11/12/2021    Closed fracture of superior pubic ramus, left, with routine healing, subsequent encounter 11/12/2021    Closed nondisplaced fracture of anterior wall of left acetabulum with routine healing 11/12/2021    Depression     End-stage renal disease on hemodialysis ([facility] [address]     HD at [facility].  Tel # [phone]    Gastroesophageal reflux disease     Glaucoma     History of acute pyelonephritis 2/20/2020    History of hydronephrosis 10/5/2021    History of infection with vancomycin resistant Enterococcus (VRE) 10/8/2021    Urine culture (collected 10/8/2021, resulted 10/14/2021) yielded growth of >100,000 colonies/ml of Enterococcus faecalis RESISTANT to Ciprofloxacin, Levofloxacin, Tetracycline and Vancomycin    History of kidney stones     History of recurrent urinary tract infection     History of sepsis 6/18/2021    History of septic shock 10/8/2021    History of urethral stricture     History of urinary tract infection 10/8/2021    Urine culture (collected 10/8/2021, resulted 10/14/2021) yielded growth of >100,000 colonies/ml of Enterococcus faecalis RESISTANT to Ciprofloxacin, Levofloxacin, Tetracycline and Vancomycin    Hyperlipidemia     Hyperphosphatemia 11/14/2021    Hypothyroidism     Lung mass     Mononeuropathy     Involving ring finger of left hand    Need for prophylactic isolation 10/8/2021    Urine culture (collected 10/8/2021, resulted 10/14/2021) yielded growth of >100,000 colonies/ml of Enterococcus faecalis RESISTANT to Ciprofloxacin, Levofloxacin, Tetracycline and Vancomycin    Paroxysmal atrial fibrillation (HCC)     Secondary hyperparathyroidism of renal origin ([facility] [address]     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 ([facility] [address]     HbA1c (10/8/2021) = 4.6    Uric acid nephrolithiasis     Urinary incontinence        Past Surgical History:  Past Surgical History:   Procedure Laterality Date    HX APPENDECTOMY      HX CHOLECYSTECTOMY      HX GASTRIC BYPASS      Gastric stapling    HX KNEE ARTHROSCOPY      HX UROLOGICAL      right PCN placement    HX UROLOGICAL  07/23/2018    RIGHT URETEROSCOPY WITH HOLMIUM LASER    IR EXCHANGE NEPHRO PERC LT SI  2/21/2020    IR EXCHANGE NEPHRO PERC RT SI  4/13/2020    IR EXCHANGE NEPHRO PERC RT SI  7/17/2020    IR NEPHROSTOMY PERC RT PLC CATH  SI  10/14/2020    IR NEPHROURETERAL PERC RT PLC CATH NEW ACCESS  SI  4/30/2020    OK INTRO CATH DIALYSIS CIRCUIT DX ANGRPH FLUOR S&I Left 9/24/2020    FISTULOGRAM LEFT/poss permanent catheter placement performed by Terri Palacios MD at [facility] CATH LAB    VASCULAR SURGERY PROCEDURE UNLIST      lef AVF       Family History:  Family History   Problem Relation Age of Onset    Heart Surgery Sister        Social History:  Social History Tobacco Use    Smoking status: Never Smoker    Smokeless tobacco: Never Used   Vaping Use    Vaping Use: Never used   Substance Use Topics    Alcohol use: Never    Drug use: Never       Allergies: Allergies   Allergen Reactions    Albumin, Human 25 % Itching     Headache - severe migraine like, itchy eyes, runny nose    Ciprofloxacin Hives    Cyclopentolate Unknown (comments)    Iron Sucrose Diarrhea    Statins-Hmg-Coa Reductase Inhibitors Other (comments)     Body ache       I reviewed and confirmed the above information with patient and updated as necessary. Review of Systems     Review of Systems   Constitutional: Negative for diaphoresis and fever. HENT: Negative for ear pain and sore throat. Eyes:        No acute change in vision   Respiratory: Negative for cough and shortness of breath. Cardiovascular: Negative for chest pain and leg swelling. Gastrointestinal: Negative for abdominal pain and vomiting. Genitourinary: Negative for dysuria. Musculoskeletal: Positive for arthralgias. Negative for back pain and neck pain. Skin: Negative for wound. Neurological: Negative for weakness and headaches. Physical Exam     Visit Vitals  BP (!) 100/43   Pulse 64   Temp 97.9 °F (36.6 °C)   Resp 16   SpO2 100%       Physical Exam  Vitals and nursing note reviewed. Constitutional:       Comments: No female lying in a hospital stretcher, nondistressed. HENT:      Mouth/Throat:      Mouth: Mucous membranes are moist.   Eyes:      Pupils: Pupils are equal, round, and reactive to light. Cardiovascular:      Rate and Rhythm: Normal rate and regular rhythm. Pulses: Normal pulses. Pulmonary:      Effort: Pulmonary effort is normal.      Breath sounds: Normal breath sounds. Abdominal:      Palpations: Abdomen is soft. Tenderness: There is no abdominal tenderness. Musculoskeletal:         General: Swelling (Ecchymosis of right knee with swelling.  Midline surgical incision from prior total knee) and tenderness (Tender palpation in the proximal femur and hip joint, pelvis is stable.) present. No signs of injury. Normal range of motion. Cervical back: Normal range of motion. Comments: Left leg about 3cm shorter than right   Skin:     General: Skin is warm. Neurological:      General: No focal deficit present. Mental Status: She is alert and oriented to person, place, and time. Mental status is at baseline. Diagnostic Study Results     Labs -  Recent Results (from the past 24 hour(s))   CBC WITH AUTOMATED DIFF    Collection Time: 03/03/22  1:05 PM   Result Value Ref Range    WBC 6.5 4.6 - 13.2 K/uL    RBC 3.37 (L) 4.20 - 5.30 M/uL    HGB 10.1 (L) 12.0 - 16.0 g/dL    HCT 32.8 (L) 35.0 - 45.0 %    MCV 97.3 78.0 - 100.0 FL    MCH 30.0 24.0 - 34.0 PG    MCHC 30.8 (L) 31.0 - 37.0 g/dL    RDW 16.4 (H) 11.6 - 14.5 %    PLATELET 541 155 - 205 K/uL    MPV 10.0 9.2 - 11.8 FL    NRBC 0.0 0  WBC    ABSOLUTE NRBC 0.00 0.00 - 0.01 K/uL    NEUTROPHILS 62 40 - 73 %    LYMPHOCYTES 25 21 - 52 %    MONOCYTES 9 3 - 10 %    EOSINOPHILS 2 0 - 5 %    BASOPHILS 1 0 - 2 %    IMMATURE GRANULOCYTES 1 (H) 0.0 - 0.5 %    ABS. NEUTROPHILS 4.0 1.8 - 8.0 K/UL    ABS. LYMPHOCYTES 1.6 0.9 - 3.6 K/UL    ABS. MONOCYTES 0.6 0.05 - 1.2 K/UL    ABS. EOSINOPHILS 0.1 0.0 - 0.4 K/UL    ABS. BASOPHILS 0.1 0.0 - 0.1 K/UL    ABS. IMM.  GRANS. 0.1 (H) 0.00 - 0.04 K/UL    DF AUTOMATED     METABOLIC PANEL, COMPREHENSIVE    Collection Time: 03/03/22  1:05 PM   Result Value Ref Range    Sodium 141 136 - 145 mmol/L    Potassium 4.8 3.5 - 5.5 mmol/L    Chloride 109 100 - 111 mmol/L    CO2 22 21 - 32 mmol/L    Anion gap 10 3.0 - 18 mmol/L    Glucose 115 (H) 74 - 99 mg/dL    BUN 55 (H) 7.0 - 18 MG/DL    Creatinine 8.15 (H) 0.6 - 1.3 MG/DL    BUN/Creatinine ratio 7 (L) 12 - 20      GFR est AA 6 (L) >60 ml/min/1.73m2    GFR est non-AA 5 (L) >60 ml/min/1.73m2    Calcium 8.3 (L) 8.5 - 10.1 MG/DL    Bilirubin, total 0.4 0.2 - 1.0 MG/DL    ALT (SGPT) 21 13 - 56 U/L    AST (SGOT) 15 10 - 38 U/L    Alk. phosphatase 130 (H) 45 - 117 U/L    Protein, total 7.1 6.4 - 8.2 g/dL    Albumin 2.9 (L) 3.4 - 5.0 g/dL    Globulin 4.2 (H) 2.0 - 4.0 g/dL    A-G Ratio 0.7 (L) 0.8 - 1.7           Radiologic Studies -   XR WRIST LT AP/LAT/OBL MIN 3V   Final Result      Amorphous tiny hyperdense foci within radiocarpal joint, slightly more   pronounced around the ulnar   styloid process without associated joint space narrowing as indeterminate   finding. Subtle avulsion fracture cannot be excluded, although no definite donor   site seen. Chronic inflammatory process is also within the consideration. Further evaluation by CT can be performed. Mild wrist edema. Thank you for your referral.       CT PELV WO CONT   Final Result   1. There has been interval incomplete healing of left sacral ala, left inferior   and superior pubic ramus fractures which were present on 11/19/2021 CT. Increased impaction at the left superior pubic ramus fracture/anterior   acetabulum since that time. 2.  Osteopenia. 3.  Diverticulosis. 4.  Mild anasarca. CT HEAD WO CONT   Final Result      Similar moderate to severe sequela of chronic microvascular ischemic disease and   mild generalized volume loss. Mild left sphenoid sinusitis, as before. XR KNEE LT MAX 2 VWS   Final Result   No acute finding. Status postop changes as above. Thank you for your referral.       XR HIPS BI W OR WO AP PELV   Final Result      No hip fracture or dislocation. Cortical disruption along the medial left acetabulum is poorly seen in detail   due to suboptimal technique. Recommend CT for potential fracture. Thank you for your referral.               Medical Decision Making   I am the first provider for this patient.     I reviewed the vital signs, available nursing notes, past medical history, past surgical history, family history and social history. Vital Signs-Reviewed the patient's vital signs. Records Reviewed: Nursing Notes and Old Medical Records (Time of Review: 1:50 PM)    Provider Notes (Medical Decision Making):   MDM  78-year-old female here with left pelvic and hip pain after mechanical fall, doubt closed head injury consider femur fracture, pelvis fracture, others    ED Course: Progress Notes, Reevaluation, and Consults:  Patient has afebrile, slight hypotensive but did not take her midodrine for the last 2 days, otherwise hemodynamically normal  She is resting comfortably, no distress, tenderness palpation in the left pelvis with decreased range of motion in the hip and knee, swelling of the knee as well. Obtain plain films, screen labs, CT head    X-ray of the pelvis shows pelvic fracture, unclear whether this is old or new given her prior. Will broaden work-up to include CT of the pelvis. CBC unremarkable  CMP consistent with end-stage renal, no severe metabolic derangement  CT head negative   CT Hip  IMPRESSION  1. There has been interval incomplete healing of left sacral ala, left inferior  and superior pubic ramus fractures which were present on 11/19/2021 CT. Increased impaction at the left superior pubic ramus fracture/anterior  acetabulum since that time. 2.  Osteopenia. 3.  Diverticulosis. 4.  Mild anasarca. I discussed with Dr. Kristel Biggs with orthopedic surgery, he recommends attempting to ambulate the patient after pain control, as patient cannot ambulate will need to get admitted to the hospitalist for further management, PT/OT. Patient was ambulated in emergency department shortly after receiving fentanyl and says that her pain was well managed. She would like to be discharged home but she wants to clear this with her son first was on his way back to the hospital.  Will sign out to Dr. Thuy Edwards pending final disposition. Procedures    Diagnosis     Clinical Impression:   1. Left hip pain    2. History of pelvic fracture        Disposition: TBD    Follow-up Information     Follow up With Specialties Details Why Contact Info    Adiel Villa MD Orthopedic Surgery Schedule an appointment as soon as possible for a visit   [facility] [facility] [address]      [facility] EMERGENCY DEPT Emergency Medicine  As needed, If symptoms worsen [address] [facility] [address]  [phone]           Patient's Medications   Start Taking    TRAMADOL (ULTRAM) 50 MG TABLET    Take 1 Tablet by mouth every six (6) hours as needed for Pain for up to 3 days. Max Daily Amount: 200 mg. Continue Taking    ACETAMINOPHEN (TYLENOL EXTRA STRENGTH) 500 MG TABLET    Take 1 Tablet by mouth every six (6) hours as needed for Pain. ALLOPURINOL (ZYLOPRIM) 100 MG TABLET    Take 1 Tablet by mouth every Monday, Wednesday, Friday. [after hemodialysis]  Indications: treatment to prevent acute gout attack    AMIODARONE (CORDARONE) 200 MG TABLET    Take 1 Tablet by mouth daily. Indications: prevention of recurrent atrial fibrillation    APIXABAN (ELIQUIS) 5 MG TABLET    Take 1 Tablet by mouth two (2) times a day. ASCORBIC ACID, VITAMIN C, (VITAMIN C) 500 MG TABLET    Take 500 mg by mouth daily. BIOTIN 1,000 MCG CHEW    Take 1 Tab by mouth daily. CALCITRIOL (ROCALTROL) 0.25 MCG CAPSULE    Take 0.25 mcg by mouth daily. CHOLECALCIFEROL (VITAMIN D3) (2,000 UNITS /50 MCG) CAP CAPSULE    Take 2,000 Units by mouth two (2) times a day. Take two tabs a total of 4000 units    CYANOCOBALAMIN 1,000 MCG TABLET    Take 1,000 mcg by mouth daily. DULOXETINE (CYMBALTA) 20 MG CAPSULE    Take 20 mg by mouth daily. ESTRADIOL (ESTRACE) 0.01 % (0.1 MG/GRAM) VAGINAL CREAM    Apply a fingertip amount around the urethra three times a week. GABAPENTIN (NEURONTIN) 100 MG CAPSULE    Take 2 Capsules by mouth Three (3) times a week. Max Daily Amount: 200 mg.  Take 2 capsules by mouth 3 times a week as needed for pain post dialysis. Indications: concern for back pain post dialysis    HYDROMORPHONE (DILAUDID) 2 MG TABLET    Take 1 mg by mouth every eight (8) hours as needed for Pain. Take 1/2 tablet by mouth every 8 hours as needed for pain level of 5 out of 10 or greater    LACTOBACILLUS SP. 50 BILLION CPU (BIO-K PLUS) 50 BILLION CELL -375 MG CAP CAPSULE    Take 1 Cap by mouth daily. LATANOPROST (XALATAN) 0.005 % OPHTHALMIC SOLUTION    Administer 1 Drop to both eyes nightly. One drop at bedtime    LEVOTHYROXINE (SYNTHROID) 125 MCG TABLET    Take 125 mcg by mouth Daily (before breakfast). LIDOCAINE (LIDODERM) 5 %    Apply patch to the affected area for 12 hours a day and remove for 12 hours a day. MECLIZINE (ANTIVERT) 25 MG TABLET    Take 25 mg by mouth three (3) times daily as needed for Dizziness. MELATONIN 5 MG TABLET    Take 5 mg by mouth nightly. METHOXY PEG-EPOETIN BETA (MIRCERA INJECTION)    30 mcg. OMEPRAZOLE (PRILOSEC) 20 MG CAPSULE    Take 20 mg by mouth daily. ONDANSETRON (ZOFRAN ODT) 4 MG DISINTEGRATING TABLET    Take 4 mg by mouth every eight (8) hours as needed for Nausea or Vomiting. SEVELAMER CARBONATE (RENVELA) 800 MG TAB TAB    Take 2 Tablets by mouth three (3) times daily (with meals). Indications: renal osteodystrophy with hyperphosphatemia    SODIUM ZIRCONIUM CYCLOSILICATE (LOKELMA) 5 GRAM POWDER PACKET    Take 1 Packet by mouth daily. Indications: high levels of potassium in the blood    VIT B CMPLX 3-FA-VIT C-BIOTIN (NEPHRO HOWIE RX) 1- MG-MG-MCG TABLET    Take 1 Tab by mouth daily. These Medications have changed    No medications on file   Stop Taking    No medications on file       Lalo Curry MD   Emergency Medicine   March 3, 2022, 1:50 PM     This note is dictated utilizing Dragon voice recognition software. Unfortunately this leads to occasional typographical errors using the voice recognition. I apologize in advance if the situation occurs.  If questions occur please do not hesitate to contact me directly.     Charlie Kidd MD

## 2022-03-15 ENCOUNTER — HOSPITAL ENCOUNTER (OUTPATIENT)
Dept: BONE DENSITY | Age: 77
Discharge: HOME | End: 2022-03-15
Attending: FAMILY MEDICINE
Payer: MEDICARE

## 2022-03-15 DIAGNOSIS — Z78.0 MENOPAUSE: ICD-10-CM

## 2022-03-15 DIAGNOSIS — S32.9XXA PELVIC FRACTURE (HCC): ICD-10-CM

## 2022-03-15 PROCEDURE — 77080 DXA BONE DENSITY AXIAL: CPT

## 2022-03-28 NOTE — H&P (VIEW-ONLY)
Randee Stern  1945           ASSESSMENT:  PVR 0 cc     1. Right distal ureteral stricture              Previously with NephU catheter now converted to [address] stent. Last stent exchange 10/5/21 by Dr. Sharmin Weinberg. 2. ESRD on HD   3. UTI / Dysuria/ Suprapubic pressure / Gross hematuria    4. Obesity - Body mass index is 37.1 kg/m². 5. DM 2  6. Pelvic Fracture 11/2021             No hx of repair, managed by ortho. Still makes too much urine to pull stent. Continue with routine stent exchange. Consider once becomes anuric to pull stent in setting of sterile urine with instillation of gentamicin into collecting system. PLAN:  1. Continue behavioral modifications for prevention of UTIs. Literature given and re-discussed. 2. Continue Estrogen cream. Refills as needed. 3. Urine sent for culture. Urine sample too small for cytology. 4. Plan for right cystoscopy and JJ stent exchange on 4/12/22. RTC post-op. DISCUSSION:  Body mass index is 37.1 kg/m². Patient's BMI is out of the normal parameters. Information about BMI was given and patient was advised to follow-up with their PCP for further management. Recurrent UTI -   Discussed behavioral modifications for prevention of UTIs including increasing fluid intake, timed voiding, and use of cranberry and D-mannose supplements. Discussed a bowel regiment using Miralax and Metamucil for soft stools. Also discussed use of a squatty potty for bowel movements. Discussed use of vaginal estrogen. No history of breast, ovarian, or uterine cancer. Advised patient to eat yogurt or supplement with a probiotic daily. CC: Ureteral stricture     HISTORY OF PRESENT ILLNESS: Susan Sinclair is a 64 y.o. female who presents today for pre-op right JJ stent exchange on 4/12/22. Established patient of Dr. Sharmin Weinberg. Patient has a history of ESRD on HD, still oliguric, with right ureteral stricture.  Previously managed by nephrostomy tube since 2018 and is s/p multiple stone surgeries in the past with a history of stent migration. She had the tube changed to a nephroureteral catheter on 7/17/2020. Patient was bothered by bag, and converted to 8F JJ stent without issues. Last stent exchanged 10/5/21 by Dr. Hector Ascencio. Patient previously reported several UTIs in the last year. Started on routine UTI prevention regimen, but has not started cranberry or D-mannose. Continues with topical estrogen three times a week. Patient reports she may have UTI today. Notes mild/intermitent dysuria, occasional nausea, and suprapubic pain x1 month. Denies gross hematuria, flank pain, or F/C/V. Patient no longer routinely voids, but is making enough urine daily with urinary incontinence when she coughs, stands, or laughs. 1-2 ppd. She notes continued stent discomfort that is manageable. No other urinary complaints at this time. Patient with recent ED visit 3/3/2022 secondary to fall. CT revealed incomplete healing of left sacral ala. Prior pelvic fracture 2/2 to another fall in 11/2021. This was not surgically repaired, but patient underwent a long course with . Currently being managed by ortho. REVIEW OF LABS & IMAGING:  CT A/P 7/15/2020  4/2020 revealing dislodged nephrostomy tube and hyronephrosis. IMPRESSION:   1. Stable right nephroureterostomy without increasing hydronephrosis on the  Stone. 2. No secondary evidence of mass, bowel obstruction, ascites, hernia, or focal  inflammatory process. 3. Bilateral renal cystic disease. 4. Sigmoid diverticulosis without diverticulitis.     Review of Systems  Constitutional: Fever: No  Skin: Rash: No  HEENT: Hearing difficulty: No  Eyes: Blurred vision: No  Cardiovascular: Chest pain: No  Respiratory: Shortness of breath: No  Gastrointestinal: Nausea/vomiting: No  Musculoskeletal: Back pain: No  Neurological: Weakness: No  Psychological: Memory loss: No  Comments/additional findings:         Past Medical History:   Diagnosis Date    Anemia in end-stage renal disease ([address]     Anticoagulated by anticoagulation treatment     On Apixaban    Chronic hypotension     On Midodrine    Chronic pain     Closed fracture of left inferior pubic ramus, with routine healing, subsequent encounter 11/12/2021    Closed fracture of superior pubic ramus, left, with routine healing, subsequent encounter 11/12/2021    Closed nondisplaced fracture of anterior wall of left acetabulum with routine healing 11/12/2021    Depression     End-stage renal disease on hemodialysis ([address]     HD at [facility].  Tel # [phone]    Gastroesophageal reflux disease     Glaucoma     History of acute pyelonephritis 2/20/2020    History of hydronephrosis 10/5/2021    History of infection with vancomycin resistant Enterococcus (VRE) 10/8/2021    Urine culture (collected 10/8/2021, resulted 10/14/2021) yielded growth of >100,000 colonies/ml of Enterococcus faecalis RESISTANT to Ciprofloxacin, Levofloxacin, Tetracycline and Vancomycin    History of kidney stones     History of recurrent urinary tract infection     History of sepsis 6/18/2021    History of septic shock 10/8/2021    History of urethral stricture     History of urinary tract infection 10/8/2021    Urine culture (collected 10/8/2021, resulted 10/14/2021) yielded growth of >100,000 colonies/ml of Enterococcus faecalis RESISTANT to Ciprofloxacin, Levofloxacin, Tetracycline and Vancomycin    Hyperlipidemia     Hyperphosphatemia 11/14/2021    Hypothyroidism     Lung mass     Mononeuropathy     Involving ring finger of left hand    Need for prophylactic isolation 10/8/2021    Urine culture (collected 10/8/2021, resulted 10/14/2021) yielded growth of >100,000 colonies/ml of Enterococcus faecalis RESISTANT to Ciprofloxacin, Levofloxacin, Tetracycline and Vancomycin    Paroxysmal atrial fibrillation ([address]     Secondary hyperparathyroidism of renal origin ([address]  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)     HbA1c (10/8/2021) = 4.6    Uric acid nephrolithiasis     Urinary incontinence        Past Surgical History:   Procedure Laterality Date    HX APPENDECTOMY      HX CHOLECYSTECTOMY      HX GASTRIC BYPASS      Gastric stapling    HX KNEE ARTHROSCOPY      HX UROLOGICAL      right PCN placement    HX UROLOGICAL  07/23/2018    RIGHT URETEROSCOPY WITH HOLMIUM LASER    IR EXCHANGE NEPHRO PERC LT SI  2/21/2020    IR EXCHANGE NEPHRO PERC RT SI  4/13/2020    IR EXCHANGE NEPHRO PERC RT SI  7/17/2020    IR NEPHROSTOMY PERC RT PLC CATH  SI  10/14/2020    IR NEPHROURETERAL PERC RT PLC CATH NEW ACCESS  SI  4/30/2020    HI INTRO CATH DIALYSIS CIRCUIT DX ANGRPH FLUOR S&I Left 9/24/2020    FISTULOGRAM LEFT/poss permanent catheter placement performed by Benson Barbour MD at [facility] CATH LAB    VASCULAR SURGERY PROCEDURE UNLIST      lef AVF       Allergies   Allergen Reactions    Albumin, Human 25 % Itching     Headache - severe migraine like, itchy eyes, runny nose    Ciprofloxacin Hives    Cyclopentolate Unknown (comments)    Iron Sucrose Diarrhea    Statins-Hmg-Coa Reductase Inhibitors Other (comments)     Body ache       Current Outpatient Medications   Medication Sig    0.9% sodium chloride solp 100 mL with iron sucrose 20 MG/ML 50 mg.    doxercalciferol (HECTOROL IV) 5 mcg.  fludrocortisone (FLORINEF) 0.1 mg tablet Take 1 Tablet by mouth.  midodrine (PROAMATINE) 10 mg tablet Take 10 mg by mouth three (3) times daily.  ondansetron hcl (ZOFRAN) 4 mg tablet     melatonin 5 mg tablet Take 5 mg by mouth nightly.  HYDROmorphone (DILAUDID) 2 mg tablet Take 1 mg by mouth every eight (8) hours as needed for Pain. Take 1/2 tablet by mouth every 8 hours as needed for pain level of 5 out of 10 or greater    allopurinoL (ZYLOPRIM) 100 mg tablet Take 1 Tablet by mouth every Monday, Wednesday, Friday.  [after hemodialysis]  Indications: treatment to prevent acute gout attack    amiodarone (CORDARONE) 200 mg tablet Take 1 Tablet by mouth daily. Indications: prevention of recurrent atrial fibrillation    sevelamer carbonate (RENVELA) 800 mg tab tab Take 2 Tablets by mouth three (3) times daily (with meals). Indications: renal osteodystrophy with hyperphosphatemia    sodium zirconium cyclosilicate (LOKELMA) 5 gram powder packet Take 1 Packet by mouth daily. Indications: high levels of potassium in the blood    acetaminophen (Tylenol Extra Strength) 500 mg tablet Take 1 Tablet by mouth every six (6) hours as needed for Pain.  lidocaine (LIDODERM) 5 % Apply patch to the affected area for 12 hours a day and remove for 12 hours a day.  gabapentin (NEURONTIN) 100 mg capsule Take 2 Capsules by mouth Three (3) times a week. Max Daily Amount: 200 mg. Take 2 capsules by mouth 3 times a week as needed for pain post dialysis. Indications: concern for back pain post dialysis (Patient taking differently: Take 100 mg by mouth daily. taking 1 pill at night before bed  Indications: concern for back pain post dialysis)    apixaban (ELIQUIS) 5 mg tablet Take 1 Tablet by mouth two (2) times a day.  meclizine (ANTIVERT) 25 mg tablet Take 25 mg by mouth three (3) times daily as needed for Dizziness.  methoxy peg-epoetin beta (MIRCERA INJECTION) 30 mcg.  DULoxetine (Cymbalta) 20 mg capsule Take 20 mg by mouth daily.  estradioL (Estrace) 0.01 % (0.1 mg/gram) vaginal cream Apply a fingertip amount around the urethra three times a week.  biotin 1,000 mcg chew Take 1 Tab by mouth daily.  cyanocobalamin 1,000 mcg tablet Take 1,000 mcg by mouth daily.  lactobacillus sp. 50 billion cpu (BIO-K PLUS) 50 billion cell -375 mg cap capsule Take 1 Cap by mouth daily.  ascorbic acid, vitamin C, (VITAMIN C) 500 mg tablet Take 500 mg by mouth daily.  calcitRIOL (ROCALTROL) 0.25 mcg capsule Take 0.25 mcg by mouth daily.     cholecalciferol (VITAMIN D3) (2,000 UNITS /50 MCG) cap capsule Take 2,000 Units by mouth two (2) times a day. Take two tabs a total of 4000 units    latanoprost (XALATAN) 0.005 % ophthalmic solution Administer 1 Drop to both eyes nightly. One drop at bedtime    levothyroxine (SYNTHROID) 125 mcg tablet Take 125 mcg by mouth Daily (before breakfast).  omeprazole (PRILOSEC) 20 mg capsule Take 20 mg by mouth daily.  ondansetron (ZOFRAN ODT) 4 mg disintegrating tablet Take 4 mg by mouth every eight (8) hours as needed for Nausea or Vomiting.  vit B Cmplx 3-FA-Vit C-Biotin (NEPHRO HOWIE RX) 1- mg-mg-mcg tablet Take 1 Tab by mouth daily. No current facility-administered medications for this visit. Family History   Problem Relation Age of Onset    Heart Surgery Sister        Social History     Socioeconomic History    Marital status: SINGLE   Tobacco Use    Smoking status: Never Smoker    Smokeless tobacco: Never Used   Vaping Use    Vaping Use: Never used   Substance and Sexual Activity    Alcohol use: Never    Drug use: Never         PHYSICAL EXAMINATION:   Visit Vitals  Ht 5' 2\" (1.575 m)   Wt 202 lb 13.2 oz (92 kg)   BMI 37.10 kg/m²     Constitutional: Well developed, well-nourished female in no acute distress. CV:  No peripheral swelling noted  Respiratory: No respiratory distress or difficulties   Female:  No abdominal tenderness. No CVA tenderness. Skin:  Normal color. No evidence of jaundice. Neuro/Psych:  Patient with appropriate affect. Alert and oriented. Lymphatic:   No enlargement of supraclavicular lymph nodes. Donaldo March PA-C  Urology of [address] [facility],[address] [facility], [address] [facility],[address]  [place], [address]  P: [phone]   F: [phone]      ICD-10-CM ICD-9-CM    1. Pre-op testing  Z01.818 V72.84 CULTURE, URINE   2. Recurrent UTI  N39.0 599.0 CULTURE, URINE   3. Pyuria  R82.81 791.9 CULTURE, URINE   4. Ureteral stricture  N13.5 593.3    5. Dysuria  R30.0 788.1    6. Suprapubic pressure  R10.2 789.09

## 2022-04-11 ENCOUNTER — ANESTHESIA EVENT (OUTPATIENT)
Dept: SURGERY | Age: 77
End: 2022-04-11
Payer: MEDICARE

## 2022-04-11 NOTE — PERIOP NOTES
PRE-SURGICAL INSTRUCTIONS        Patient's Name:  Yun Joaquin Date:  4/11/2022            Covid Testing Date and Time:    Surgery Date:  4/12/2022                1. Do NOT eat or drink anything, including candy, gum, or ice chips after midnight on 4/12/22, unless you have specific instructions from your surgeon or anesthesia provider to do so.  2. You may brush your teeth before coming to the hospital.  3. No smoking 24 hours prior to the day of surgery. 4. No alcohol 24 hours prior to the day of surgery. 5. No recreational drugs for one week prior to the day of surgery. 6. Leave all valuables, including money/purse, at home. 7. Remove all jewelry, nail polish, acrylic nails, and makeup (including mascara); no lotions powders, deodorant, or perfume/cologne/after shave on the skin. 8. Follow instruction for Hibiclens washes and CHG wipes from surgeon's office. 9. Glasses/contact lenses and dentures may be worn to the hospital.  They will be removed prior to surgery. 10. Call your doctor if symptoms of a cold or illness develop within 24-48 hours prior to your surgery. 11.  If you are having an outpatient procedure, please make arrangements for a responsible ADULT TO [address] [place] [address] and stay with you for 24 hours after your surgery. 12. ONE VISITOR in the hospital at this time for outpatient procedures. Exceptions may be made for surgical admissions, per nursing unit guidelines      Special Instructions:      Bring list of CURRENT medications. Bring any pertinent legal medical records. Take these medications the morning of surgery with a sip of water:  Synthroid and Midodrine    Follow physician instructions about stopping anticoagulants. On the day of surgery, come in the main entrance of [facility]. Let the  at the desk know you are there for surgery. A staff member will come escort you to the surgical area on the second floor.     If you have any questions or concerns, please do not hesitate to call:     (Prior to the day of surgery) PAT department:  [phone]   (Day of surgery) Pre-Op department:  [phone]    These surgical instructions were reviewed with [facility] during the [facility] phone call.

## 2022-04-12 ENCOUNTER — APPOINTMENT (OUTPATIENT)
Dept: GENERAL RADIOLOGY | Age: 77
End: 2022-04-12
Attending: UROLOGY
Payer: MEDICARE

## 2022-04-12 ENCOUNTER — ANESTHESIA (OUTPATIENT)
Dept: SURGERY | Age: 77
End: 2022-04-12
Payer: MEDICARE

## 2022-04-12 ENCOUNTER — HOSPITAL ENCOUNTER (OUTPATIENT)
Age: 77
Discharge: HOME | End: 2022-04-12
Attending: UROLOGY | Admitting: UROLOGY
Payer: MEDICARE

## 2022-04-12 VITALS
DIASTOLIC BLOOD PRESSURE: 51 MMHG | HEIGHT: 62 IN | RESPIRATION RATE: 20 BRPM | HEART RATE: 58 BPM | SYSTOLIC BLOOD PRESSURE: 93 MMHG | TEMPERATURE: 97.8 F | OXYGEN SATURATION: 98 % | WEIGHT: 203.9 LBS | BODY MASS INDEX: 37.52 KG/M2

## 2022-04-12 PROBLEM — N35.92 STRICTURE OF FEMALE URETHRA: Status: ACTIVE | Noted: 2022-01-01

## 2022-04-12 LAB
ANION GAP SERPL CALC-SCNC: 8 MMOL/L (ref 3–18)
BUN SERPL-MCNC: 27 MG/DL (ref 7–18)
BUN/CREAT SERPL: 5 (ref 12–20)
CALCIUM SERPL-MCNC: 9.6 MG/DL (ref 8.5–10.1)
CHLORIDE SERPL-SCNC: 100 MMOL/L (ref 100–111)
CO2 SERPL-SCNC: 30 MMOL/L (ref 21–32)
CREAT SERPL-MCNC: 5.84 MG/DL (ref 0.6–1.3)
ERYTHROCYTE [DISTWIDTH] IN BLOOD BY AUTOMATED COUNT: 16.9 % (ref 11.6–14.5)
GLUCOSE BLD STRIP.AUTO-MCNC: 87 MG/DL (ref 70–110)
GLUCOSE BLD STRIP.AUTO-MCNC: 93 MG/DL (ref 70–110)
GLUCOSE SERPL-MCNC: 93 MG/DL (ref 74–99)
HCT VFR BLD AUTO: 36.4 % (ref 35–45)
HGB BLD-MCNC: 11.3 G/DL (ref 12–16)
INR PPP: 1.3 (ref 0.8–1.2)
MCH RBC QN AUTO: 32.1 PG (ref 24–34)
MCHC RBC AUTO-ENTMCNC: 31 G/DL (ref 31–37)
MCV RBC AUTO: 103.4 FL (ref 78–100)
NRBC # BLD: 0 K/UL (ref 0–0.01)
NRBC BLD-RTO: 0 PER 100 WBC
PLATELET # BLD AUTO: 263 K/UL (ref 135–420)
PMV BLD AUTO: 9.8 FL (ref 9.2–11.8)
POTASSIUM SERPL-SCNC: 4.6 MMOL/L (ref 3.5–5.5)
PROTHROMBIN TIME: 16 SEC (ref 11.5–15.2)
RBC # BLD AUTO: 3.52 M/UL (ref 4.2–5.3)
SODIUM SERPL-SCNC: 138 MMOL/L (ref 136–145)
WBC # BLD AUTO: 7.2 K/UL (ref 4.6–13.2)

## 2022-04-12 PROCEDURE — 80048 BASIC METABOLIC PNL TOTAL CA: CPT

## 2022-04-12 PROCEDURE — 76210000006 HC OR PH I REC 0.5 TO 1 HR: Performed by: UROLOGY

## 2022-04-12 PROCEDURE — 77030040361 HC SLV COMPR DVT MDII -B: Performed by: UROLOGY

## 2022-04-12 PROCEDURE — 85610 PROTHROMBIN TIME: CPT

## 2022-04-12 PROCEDURE — 36415 COLL VENOUS BLD VENIPUNCTURE: CPT

## 2022-04-12 PROCEDURE — 74420 UROGRAPHY RTRGR +-KUB: CPT

## 2022-04-12 PROCEDURE — 83036 HEMOGLOBIN GLYCOSYLATED A1C: CPT

## 2022-04-12 PROCEDURE — 77030040922 HC BLNKT HYPOTHRM STRY -A: Performed by: UROLOGY

## 2022-04-12 PROCEDURE — 74011250637 HC RX REV CODE- 250/637: Performed by: NURSE ANESTHETIST, CERTIFIED REGISTERED

## 2022-04-12 PROCEDURE — 76210000021 HC REC RM PH II 0.5 TO 1 HR: Performed by: UROLOGY

## 2022-04-12 PROCEDURE — 2709999900 HC NON-CHARGEABLE SUPPLY: Performed by: UROLOGY

## 2022-04-12 PROCEDURE — 74011000250 HC RX REV CODE- 250: Performed by: NURSE ANESTHETIST, CERTIFIED REGISTERED

## 2022-04-12 PROCEDURE — 85027 COMPLETE CBC AUTOMATED: CPT

## 2022-04-12 PROCEDURE — 74011250636 HC RX REV CODE- 250/636: Performed by: NURSE ANESTHETIST, CERTIFIED REGISTERED

## 2022-04-12 PROCEDURE — 00910 ANES TRANSURETHRAL PX NOS: CPT | Performed by: ANESTHESIOLOGY

## 2022-04-12 PROCEDURE — 76010000149 HC OR TIME 1 TO 1.5 HR: Performed by: UROLOGY

## 2022-04-12 PROCEDURE — 77030019927 HC TBNG IRR CYSTO BAXT -A: Performed by: UROLOGY

## 2022-04-12 PROCEDURE — 74011250636 HC RX REV CODE- 250/636: Performed by: UROLOGY

## 2022-04-12 PROCEDURE — 99100 ANES PT EXTEME AGE<1 YR&>70: CPT | Performed by: NURSE ANESTHETIST, CERTIFIED REGISTERED

## 2022-04-12 PROCEDURE — C2617 STENT, NON-COR, TEM W/O DEL: HCPCS | Performed by: UROLOGY

## 2022-04-12 PROCEDURE — 00910 ANES TRANSURETHRAL PX NOS: CPT | Performed by: NURSE ANESTHETIST, CERTIFIED REGISTERED

## 2022-04-12 PROCEDURE — 76060000033 HC ANESTHESIA 1 TO 1.5 HR: Performed by: UROLOGY

## 2022-04-12 PROCEDURE — 82962 GLUCOSE BLOOD TEST: CPT

## 2022-04-12 PROCEDURE — 74011000636 HC RX REV CODE- 636: Performed by: UROLOGY

## 2022-04-12 PROCEDURE — 99100 ANES PT EXTEME AGE<1 YR&>70: CPT | Performed by: ANESTHESIOLOGY

## 2022-04-12 PROCEDURE — 74011000250 HC RX REV CODE- 250: Performed by: UROLOGY

## 2022-04-12 DEVICE — URETERAL STENT
Type: IMPLANTABLE DEVICE | Site: URETER | Status: FUNCTIONAL
Brand: POLARIS™ ULTRA

## 2022-04-12 RX ORDER — INSULIN LISPRO 100 [IU]/ML
INJECTION, SOLUTION INTRAVENOUS; SUBCUTANEOUS ONCE
Status: DISCONTINUED | OUTPATIENT
Start: 2022-04-12 | End: 2022-04-12 | Stop reason: HOSPADM

## 2022-04-12 RX ORDER — FAMOTIDINE 20 MG/1
20 TABLET, FILM COATED ORAL ONCE
Status: COMPLETED | OUTPATIENT
Start: 2022-04-12 | End: 2022-04-12

## 2022-04-12 RX ORDER — LIDOCAINE HYDROCHLORIDE 20 MG/ML
INJECTION, SOLUTION EPIDURAL; INFILTRATION; INTRACAUDAL; PERINEURAL AS NEEDED
Status: DISCONTINUED | OUTPATIENT
Start: 2022-04-12 | End: 2022-04-12 | Stop reason: HOSPADM

## 2022-04-12 RX ORDER — FENTANYL CITRATE 50 UG/ML
INJECTION, SOLUTION INTRAMUSCULAR; INTRAVENOUS AS NEEDED
Status: DISCONTINUED | OUTPATIENT
Start: 2022-04-12 | End: 2022-04-12 | Stop reason: HOSPADM

## 2022-04-12 RX ORDER — PROPOFOL 10 MG/ML
INJECTION, EMULSION INTRAVENOUS AS NEEDED
Status: DISCONTINUED | OUTPATIENT
Start: 2022-04-12 | End: 2022-04-12 | Stop reason: HOSPADM

## 2022-04-12 RX ORDER — SODIUM CHLORIDE 9 MG/ML
25 INJECTION, SOLUTION INTRAVENOUS CONTINUOUS
Status: DISCONTINUED | OUTPATIENT
Start: 2022-04-12 | End: 2022-04-12 | Stop reason: HOSPADM

## 2022-04-12 RX ORDER — LIDOCAINE HYDROCHLORIDE 10 MG/ML
0.1 INJECTION, SOLUTION EPIDURAL; INFILTRATION; INTRACAUDAL; PERINEURAL AS NEEDED
Status: DISCONTINUED | OUTPATIENT
Start: 2022-04-12 | End: 2022-04-12 | Stop reason: HOSPADM

## 2022-04-12 RX ORDER — PHENYLEPHRINE HCL IN 0.9% NACL 1 MG/10 ML
SYRINGE (ML) INTRAVENOUS AS NEEDED
Status: DISCONTINUED | OUTPATIENT
Start: 2022-04-12 | End: 2022-04-12 | Stop reason: HOSPADM

## 2022-04-12 RX ORDER — EPHEDRINE SULFATE/0.9% NACL/PF 25 MG/5 ML
SYRINGE (ML) INTRAVENOUS AS NEEDED
Status: DISCONTINUED | OUTPATIENT
Start: 2022-04-12 | End: 2022-04-12 | Stop reason: HOSPADM

## 2022-04-12 RX ADMIN — Medication 5 MG: at 14:04

## 2022-04-12 RX ADMIN — LIDOCAINE HYDROCHLORIDE 60 MG: 20 INJECTION, SOLUTION EPIDURAL; INFILTRATION; INTRACAUDAL; PERINEURAL at 13:46

## 2022-04-12 RX ADMIN — PROPOFOL 20 MG: 10 INJECTION, EMULSION INTRAVENOUS at 13:59

## 2022-04-12 RX ADMIN — PROPOFOL 50 MG: 10 INJECTION, EMULSION INTRAVENOUS at 13:46

## 2022-04-12 RX ADMIN — Medication 5 MG: at 14:03

## 2022-04-12 RX ADMIN — FENTANYL CITRATE 25 MCG: 50 INJECTION, SOLUTION INTRAMUSCULAR; INTRAVENOUS at 13:50

## 2022-04-12 RX ADMIN — PROPOFOL 50 MG: 10 INJECTION, EMULSION INTRAVENOUS at 13:50

## 2022-04-12 RX ADMIN — CEFTRIAXONE 1 G: 1 INJECTION, POWDER, FOR SOLUTION INTRAMUSCULAR; INTRAVENOUS at 13:56

## 2022-04-12 RX ADMIN — FENTANYL CITRATE 50 MCG: 50 INJECTION, SOLUTION INTRAMUSCULAR; INTRAVENOUS at 13:46

## 2022-04-12 RX ADMIN — FAMOTIDINE 20 MG: 20 TABLET ORAL at 12:52

## 2022-04-12 RX ADMIN — SODIUM CHLORIDE 25 ML/HR: 900 INJECTION, SOLUTION INTRAVENOUS at 12:51

## 2022-04-12 RX ADMIN — Medication 100 MCG: at 14:08

## 2022-04-12 RX ADMIN — FENTANYL CITRATE 25 MCG: 50 INJECTION, SOLUTION INTRAMUSCULAR; INTRAVENOUS at 13:59

## 2022-04-12 RX ADMIN — PROPOFOL 20 MG: 10 INJECTION, EMULSION INTRAVENOUS at 13:53

## 2022-04-12 RX ADMIN — Medication 100 MCG: at 14:10

## 2022-04-12 NOTE — DISCHARGE INSTRUCTIONS
Discharge Instructions          ADDITIONAL INFORMATION:   You have indwelling ureteral stents which frequently causes slight discomfort in the flank region and bladder spasms. If you are bothered by these symptoms, please contact our office and we can presribe you flomax as needed for flank discomfort or Ditropan for bladder spasms. The indwelling stent will need to be removed/exchanged as directed below. If the stent is left in place without appropriate follow-up, it may become encrusted with stone and/or infected. If that occurs, you will require multiple additional surgeries to fix this. It is very important you follow up for appropriate removal or exchange of ureteral stents. If you experience any fevers > 100.4, significant nausea/vomiting, or significant worsening of pain, please contact us at the number below. It is common to experience mild, recurrent blood in your urine and this is likely due to stent irritation. If the bleeding continues to worsen with passage of clots, you are unable to urinate, or you experience significant light-headedness, please contact us at the number below. FOLLOW UP CARE:  You have a return appointment with Dr. Ray Payment in 6 months for possible removal vs exchange. Patient Education        Ureteral Stent Placement: What to Expect at Home  Your Recovery     A ureteral (say \"you-REE-ter-ul\") stent is a thin, hollow tube that is placed in the ureter to help urine pass from the kidney into the bladder. Ureters are the tubes that connect the kidneys to the bladder. You may have a small amount of blood in your urine for 1 to 3 days after the procedure. While the stent is in place, you may have to urinate more often, feel a sudden need to urinate, or feel like you can't completely empty your bladder. You may feel some pain when you urinate or do strenuous activity. You also may notice a small amount of blood in your urine after strenuous activities.  These side effects usually don't prevent people from doing their normal daily activities. You may have a thin string coming out of your urethra. Your urethra is the tube that carries urine from your bladder to outside your body. This string is attached to the stent. Try not to pull on the string. It will be used to pull out the stent when you no longer need it. After the procedure, urine may flow better from your kidneys to your bladder. A ureteral stent may be left in place for several days or for as long as several months. Your doctor will take it out when you no longer need it. Or, in some cases, it may be taken out at home. This care sheet gives you a general idea about how long it will take for you to recover. But each person recovers at a different pace. Follow the steps below to get better as quickly as possible. How can you care for yourself at home? Activity    · Rest when you feel tired. Getting enough sleep will help you recover.     · Avoid strenuous activities, such as bicycle riding, jogging, weight lifting, or aerobic exercise, until your doctor says it is okay.     · Ask your doctor when you can drive again.     · Most people are able to return to work the day after the procedure. If your work requires intense activity, you may feel pain in your kidney area or get tired easily. If this happens, you may need to do less strenuous activities while the stent is in.     · Ask your doctor when it is okay for you to have sex. Diet    · You can eat your normal diet. If your stomach is upset, try bland, low-fat foods like plain rice, broiled chicken, toast, and yogurt.     · Drink plenty of fluids (unless your doctor tells you not to). Medicines    · Your doctor will tell you if and when you can restart your medicines. You will also get instructions about taking any new medicines.     · If you take aspirin or some other blood thinner, ask your doctor if and when to start taking it again.  Make sure that you understand exactly what your doctor wants you to do.     · Be safe with medicines. Take pain medicines exactly as directed. ? If the doctor gave you a prescription medicine for pain, take it as prescribed. ? If you are not taking a prescription pain medicine, ask your doctor if you can take an over-the-counter medicine.     · If you think your pain medicine is making you sick to your stomach:  ? Take your medicine after meals (unless your doctor has told you not to). ? Ask your doctor for a different pain medicine.     · If your doctor prescribed antibiotics, take them as directed. Do not stop taking them just because you feel better. You need to take the full course of antibiotics. Follow-up care is a key part of your treatment and safety. Be sure to make and go to all appointments, and call your doctor if you are having problems. It's also a good idea to know your test results and keep a list of the medicines you take. When should you call for help? Call 911 anytime you think you may need emergency care. For example, call if:    · You passed out (lost consciousness).     · You have severe trouble breathing.     · You have sudden chest pain and shortness of breath, or you cough up blood.     · You have severe belly pain. Call your doctor now or seek immediate medical care if:    · Part or all of the stent comes out of your urethra.     · You have pain that does not get better after you take pain medicine.     · You have symptoms of a urinary infection. For example:  ? You have blood or pus in your urine. ? You have pain in your back just below your rib cage. This is called flank pain. ? You have a fever, chills, or body aches. ? It hurts to urinate. ? You have groin or belly pain.     · You cannot control when you urinate, or you leak urine. Watch closely for changes in your health, and be sure to contact your doctor if you have any problems. Where can you learn more?   Go to http://www.gray.com/  Enter [id number] in the search box to learn more about \"Ureteral Stent Placement: What to Expect at Home. \"  Current as of: October 18, 2021               Content Version: 13.2  © 3632-7140 [employer]. Care instructions adapted under license by [employer] (which disclaims liability or warranty for this information). If you have questions about a medical condition or this instruction, always ask your healthcare professional. [address] any warranty or liability for your use of this information. DISCHARGE SUMMARY from Nurse    PATIENT INSTRUCTIONS:    After general anesthesia or intravenous sedation, for 24 hours or while taking prescription Narcotics:  · Limit your activities  · Do not drive and operate hazardous machinery  · Do not make important personal or business decisions  · Do  not drink alcoholic beverages  · If you have not urinated within 8 hours after discharge, please contact your surgeon on call. Report the following to your surgeon:  · Excessive pain, swelling, redness or odor of or around the surgical area  · Temperature over 100.5  · Nausea and vomiting lasting longer than 4 hours or if unable to take medications  · Any signs of decreased circulation or nerve impairment to extremity: change in color, persistent  numbness, tingling, coldness or increase pain  · Any questions    What to do at Home:      *  Please give a list of your current medications to your Primary Care Provider. *  Please update this list whenever your medications are discontinued, doses are      changed, or new medications (including over-the-counter products) are added. *  Please carry medication information at all times in case of emergency situations.     These are general instructions for a healthy lifestyle:    No smoking/ No tobacco products/ Avoid exposure to second hand smoke  Surgeon General's Warning:  Quitting smoking now greatly reduces serious risk to your health. Obesity, smoking, and sedentary lifestyle greatly increases your risk for illness    A healthy diet, regular physical exercise & weight monitoring are important for maintaining a healthy lifestyle    You may be retaining fluid if you have a history of heart failure or if you experience any of the following symptoms:  Weight gain of 3 pounds or more overnight or 5 pounds in a week, increased swelling in our hands or feet or shortness of breath while lying flat in bed. Please call your doctor as soon as you notice any of these symptoms; do not wait until your next office visit. The discharge information has been reviewed with the {PATIENT PARENT GUARDIAN:25228}. The {PATIENT PARENT GUARDIAN:11628} verbalized understanding. Discharge medications reviewed with the {Dishcarge meds reviewed ULME:10937} and appropriate educational materials and side effects teaching were provided.   ___________________________________________________________________________________________________________________________________

## 2022-04-12 NOTE — ANESTHESIA PREPROCEDURE EVALUATION
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)      HEMATOLOGY   (+) Anemia in end-stage renal disease (HCC)       Anesthetic History   No history of anesthetic complications            Review of Systems / Medical History  Patient summary reviewed and pertinent labs reviewed    Pulmonary  Within defined limits                 Neuro/Psych         Psychiatric history (Depression)     Cardiovascular            Dysrhythmias : atrial fibrillation      Exercise tolerance: <4 METS: Uses a walker     GI/Hepatic/Renal         Renal disease (Last HD-yesterday): ESRD and dialysis      Comments: VRE Endo/Other    Diabetes: well controlled, type 2  Hypothyroidism  Arthritis     Other Findings            Physical Exam    Airway  Mallampati: II  TM Distance: 4 - 6 cm  Neck ROM: normal range of motion   Mouth opening: Normal     Cardiovascular  Regular rate and rhythm,  S1 and S2 normal,  no murmur, click, rub, or gallop             Dental    Dentition: Poor dentition     Pulmonary  Breath sounds clear to auscultation               Abdominal  GI exam deferred       Other Findings            Anesthetic Plan    ASA: 3            Induction: Intravenous  Anesthetic plan and risks discussed with: Patient

## 2022-04-12 NOTE — OP NOTES
Operative Note  Patient: Pradeep Machado               Sex: female             MRN: [id number]  YOB: 1945      Age:  64 y.o. Preoperative Diagnosis: Hydronephrosis, unspecified hydronephrosis type [N13.30]  Postoperative Diagnosis:  Hydronephrosis, unspecified hydronephrosis type [N13.30]  Surgeon: Yosi Kessler     This is a 63 y/o woman ESRD on HD still with significant urine production with right distal ureteral stricture managed with chronic stent last changed 6 months here today for cysto, stent exchange. Preop culture negative.      Procedure:    1) Cystoscopy  2) Retrograde pyelogram with interpretation  3) Right Ureteral stent exchange      Findings:    1). Cloudy urine in bladder, sent for culture   2). Retrograde pyelogram revealed moderate right hydro   3). 8x24 JJ Ureteral stent exchange without complication      Narrative of Events: The patient was brought to the operative suite. MAC was induced and preoperative antibiotics were administered. They were then placed in the dorsal lithotomy position and their external genitalia was prepped and draped in the usual fashion. A surgical timeout was performed confirming the patient's name, date of birth, laterality, and antibiotics. All were in agreement. A 22 [place] cystourethroscope was then inserted into the patients bladder. Stent was grasped and kit out to the meatus. Wire advanced and stent removed. New 8x24 JJ stent placed with good culrs in bladder and renal pelvis.       The bladder was drained and the patient was then awoken and transferred to the recovery room in stable condition.      Estimated Blood Loss: <5CC      Anesthesia:  MAC                      Implants:   Implant Name Type Inv.  Item Serial No.  Lot No. LRB No. Used Action   Oral Conradi DBL-PGTL [id number] -- [employer] - [id number]  Oral Conradi DBL-PGTL [id number] [facility] [employer]_ [id number] Right 1 Implanted         Drains: JJ stent Complications:  None           Plan:  1.  Plan for possible stent removal in 6 months now that UO <50cc/day     Alexandra Chau MD  4/12/2022

## 2022-04-12 NOTE — ANESTHESIA POSTPROCEDURE EVALUATION
Procedure(s):  CYSTOSCOPY/RIGHT DOUBLE J STENT EXCHANGE/C-ARM. No value filed. Anesthesia Post Evaluation      Multimodal analgesia: multimodal analgesia used between 6 hours prior to anesthesia start to PACU discharge  Patient location during evaluation: bedside  Patient participation: complete - patient participated  Level of consciousness: awake  Pain management: adequate  Airway patency: patent  Anesthetic complications: no  Cardiovascular status: stable  Respiratory status: acceptable  Hydration status: acceptable  Post anesthesia nausea and vomiting:  controlled      INITIAL Post-op Vital signs:   Vitals Value Taken Time   BP 82/33 04/12/22 1525   Temp 36.4 °C (97.6 °F) 04/12/22 1455   Pulse 62 04/12/22 1526   Resp 17 04/12/22 1526   SpO2 96 % 04/12/22 1526   Vitals shown include unvalidated device data.

## 2022-04-12 NOTE — INTERVAL H&P NOTE
Update History & Physical    The Patient's History and Physical of March 29,   Progress was reviewed with the patient and I examined the patient. There was no change. The surgical site was confirmed by the patient and me. Plan:  The risk, benefits, expected outcome, and alternative to the recommended procedure have been discussed with the patient. Patient understands and wants to proceed with the procedure.     Electronically signed by Genaro De La Cruz MD on 4/12/2022 at 12:08 PM

## 2022-04-14 LAB — HBA1C MFR BLD: 4.3 % (ref 4.2–5.6)

## 2022-05-02 ENCOUNTER — HOSPITAL ENCOUNTER (EMERGENCY)
Age: 77
Discharge: HOME | End: 2022-05-02
Attending: EMERGENCY MEDICINE
Payer: MEDICARE

## 2022-05-02 VITALS
HEIGHT: 62 IN | OXYGEN SATURATION: 98 % | RESPIRATION RATE: 16 BRPM | HEART RATE: 85 BPM | WEIGHT: 207.23 LBS | DIASTOLIC BLOOD PRESSURE: 53 MMHG | TEMPERATURE: 98.5 F | SYSTOLIC BLOOD PRESSURE: 108 MMHG | BODY MASS INDEX: 38.14 KG/M2

## 2022-05-02 DIAGNOSIS — F32.A DEPRESSION, UNSPECIFIED DEPRESSION TYPE: ICD-10-CM

## 2022-05-02 DIAGNOSIS — Z99.2 ESRD ON DIALYSIS (HCC): Primary | ICD-10-CM

## 2022-05-02 DIAGNOSIS — N18.6 ESRD ON DIALYSIS (HCC): Primary | ICD-10-CM

## 2022-05-02 LAB
ANION GAP SERPL CALC-SCNC: 6 MMOL/L (ref 3–18)
BASOPHILS # BLD: 0.1 K/UL (ref 0–0.1)
BASOPHILS NFR BLD: 1 % (ref 0–2)
BUN SERPL-MCNC: 25 MG/DL (ref 7–18)
BUN/CREAT SERPL: 6 (ref 12–20)
CALCIUM SERPL-MCNC: 10 MG/DL (ref 8.5–10.1)
CHLORIDE SERPL-SCNC: 101 MMOL/L (ref 100–111)
CO2 SERPL-SCNC: 32 MMOL/L (ref 21–32)
COVID-19 RAPID TEST, COVR: NOT DETECTED
CREAT SERPL-MCNC: 4.4 MG/DL (ref 0.6–1.3)
DIFFERENTIAL METHOD BLD: ABNORMAL
EOSINOPHIL # BLD: 0.1 K/UL (ref 0–0.4)
EOSINOPHIL NFR BLD: 2 % (ref 0–5)
ERYTHROCYTE [DISTWIDTH] IN BLOOD BY AUTOMATED COUNT: 15.2 % (ref 11.6–14.5)
ETHANOL SERPL-MCNC: <3 MG/DL (ref 0–3)
GLUCOSE SERPL-MCNC: 97 MG/DL (ref 74–99)
HCT VFR BLD AUTO: 34.8 % (ref 35–45)
HGB BLD-MCNC: 11.2 G/DL (ref 12–16)
IMM GRANULOCYTES # BLD AUTO: 0 K/UL (ref 0–0.04)
IMM GRANULOCYTES NFR BLD AUTO: 1 % (ref 0–0.5)
LYMPHOCYTES # BLD: 1.5 K/UL (ref 0.9–3.6)
LYMPHOCYTES NFR BLD: 29 % (ref 21–52)
MCH RBC QN AUTO: 32.8 PG (ref 24–34)
MCHC RBC AUTO-ENTMCNC: 32.2 G/DL (ref 31–37)
MCV RBC AUTO: 102.1 FL (ref 78–100)
MONOCYTES # BLD: 0.5 K/UL (ref 0.05–1.2)
MONOCYTES NFR BLD: 9 % (ref 3–10)
NEUTS SEG # BLD: 3.1 K/UL (ref 1.8–8)
NEUTS SEG NFR BLD: 59 % (ref 40–73)
NRBC # BLD: 0 K/UL (ref 0–0.01)
NRBC BLD-RTO: 0 PER 100 WBC
PLATELET # BLD AUTO: 239 K/UL (ref 135–420)
PMV BLD AUTO: 9.7 FL (ref 9.2–11.8)
POTASSIUM SERPL-SCNC: 4.2 MMOL/L (ref 3.5–5.5)
RBC # BLD AUTO: 3.41 M/UL (ref 4.2–5.3)
SODIUM SERPL-SCNC: 139 MMOL/L (ref 136–145)
SOURCE, COVRS: NORMAL
WBC # BLD AUTO: 5.3 K/UL (ref 4.6–13.2)

## 2022-05-02 PROCEDURE — 99283 EMERGENCY DEPT VISIT LOW MDM: CPT

## 2022-05-02 PROCEDURE — 85025 COMPLETE CBC W/AUTO DIFF WBC: CPT

## 2022-05-02 PROCEDURE — 87635 SARS-COV-2 COVID-19 AMP PRB: CPT

## 2022-05-02 PROCEDURE — 82077 ASSAY SPEC XCP UR&BREATH IA: CPT

## 2022-05-02 PROCEDURE — 80048 BASIC METABOLIC PNL TOTAL CA: CPT

## 2022-05-02 NOTE — BSMART NOTE
Crisis Note: Patient is alert and oriented x 4, calm ,cooperative, very pleasant. Patient denied thoughts of harm towards self or others. \"I'm a [place] and I would never try to harm myself. I have never tried to harm myself. Donzell Pyo Donzell Pyo I'm a coward about stuff like that. I said the wrong thing when I was at dialysis today. I'm sorry for taking my family through this. I would never harm myself or anyone else. \" Patient verbalized that she has never been seen by a psychiatrist or therapist. Patient stated that she has a loving and supportive family, and she is very sorry for saying. \"Be kind and put a bag over my head. \" Patient denied hallucinations. Discussed with Dr. Lawanda Klein, patient is not suicidal, nor homicidal. Patient given [facility] outpatient referral list to follow up with the provider of choice. Patient discharged per ED.

## 2022-05-02 NOTE — ED PROVIDER NOTES
EMERGENCY DEPARTMENT HISTORY AND PHYSICAL EXAM    2:06 PM      Date: 5/2/2022  Patient Name: Michael Haynes    History of Presenting Illness     Chief Complaint   Patient presents with   [address]         History Provided By: Patient  Location/Duration/Severity/Modifying factors   Patient is a 68-year-old female with a history of chronic pain, end-stage renal disease on dialysis (Monday Wednesday Friday), hyperlipidemia, thyroid disease, neuropathy, depression, the presents emergency department from hemodialysis after making a comment to the dialysis nurse that she was thinking about harming herself. The patient was hypotensive in dialysis and she said to the dialysis nurse to put a bag over her head and let her go. The patient said that she is a DNR and has been declining to the point that she has no quality of life. The patient says she does not actually want to harm her self but is starting to feel hopeless from her conditions. The patient denies any other aggravating alleviating factors. Patient denies any smoking, drinking, or drug use. The patient moved several years ago from [place] to be with her family and now lives with her family. The patient said she is well cared for at home just is not happy with her quality of life. PCP: Pernell Garcia MD    Current Outpatient Medications   Medication Sig Dispense Refill    0.9% sodium chloride solp 100 mL with iron sucrose 20 MG/ML 50 mg. (Patient not taking: Reported on 4/12/2022)      doxercalciferol (HECTOROL IV) 5 mcg.  fludrocortisone (FLORINEF) 0.1 mg tablet Take 1 Tablet by mouth. (Patient not taking: Reported on 4/12/2022)      midodrine (PROAMATINE) 10 mg tablet Take 10 mg by mouth three (3) times daily.  ondansetron hcl (ZOFRAN) 4 mg tablet  (Patient not taking: Reported on 4/12/2022)      melatonin 5 mg tablet Take 5 mg by mouth nightly.       HYDROmorphone (DILAUDID) 2 mg tablet Take 1 mg by mouth every eight (8) hours as needed for Pain. Take 1/2 tablet by mouth every 8 hours as needed for pain level of 5 out of 10 or greater      allopurinoL (ZYLOPRIM) 100 mg tablet Take 1 Tablet by mouth every Monday, Wednesday, Friday. [after hemodialysis]  Indications: treatment to prevent acute gout attack 12 Tablet 0    amiodarone (CORDARONE) 200 mg tablet Take 1 Tablet by mouth daily. Indications: prevention of recurrent atrial fibrillation 30 Tablet 0    sevelamer carbonate (RENVELA) 800 mg tab tab Take 2 Tablets by mouth three (3) times daily (with meals). Indications: renal osteodystrophy with hyperphosphatemia 180 Tablet 0    sodium zirconium cyclosilicate (LOKELMA) 5 gram powder packet Take 1 Packet by mouth daily. Indications: high levels of potassium in the blood 30 Packet 0    acetaminophen (Tylenol Extra Strength) 500 mg tablet Take 1 Tablet by mouth every six (6) hours as needed for Pain. 30 Tablet 0    lidocaine (LIDODERM) 5 % Apply patch to the affected area for 12 hours a day and remove for 12 hours a day. 1 Each 0    gabapentin (NEURONTIN) 100 mg capsule Take 2 Capsules by mouth Three (3) times a week. Max Daily Amount: 200 mg. Take 2 capsules by mouth 3 times a week as needed for pain post dialysis. Indications: concern for back pain post dialysis (Patient taking differently: Take 100 mg by mouth daily. taking 1 pill at night before bed  Indications: concern for back pain post dialysis) 15 Capsule 0    apixaban (ELIQUIS) 5 mg tablet Take 1 Tablet by mouth two (2) times a day. 60 Tablet 0    meclizine (ANTIVERT) 25 mg tablet Take 25 mg by mouth three (3) times daily as needed for Dizziness. (Patient not taking: Reported on 4/12/2022)      methoxy peg-epoetin beta (MIRCERA INJECTION) 30 mcg.  DULoxetine (Cymbalta) 20 mg capsule Take 20 mg by mouth daily.  estradioL (Estrace) 0.01 % (0.1 mg/gram) vaginal cream Apply a fingertip amount around the urethra three times a week.  30 g 3    biotin 1,000 mcg chew Take 1 Tab by mouth daily.  cyanocobalamin 1,000 mcg tablet Take 1,000 mcg by mouth daily.  lactobacillus sp. 50 billion cpu (BIO-K PLUS) 50 billion cell -375 mg cap capsule Take 1 Cap by mouth daily. (Patient not taking: Reported on 4/12/2022) 30 Cap 2    ascorbic acid, vitamin C, (VITAMIN C) 500 mg tablet Take 500 mg by mouth daily.  calcitRIOL (ROCALTROL) 0.25 mcg capsule Take 0.25 mcg by mouth daily.  cholecalciferol (VITAMIN D3) (2,000 UNITS /50 MCG) cap capsule Take 2,000 Units by mouth two (2) times a day. Take two tabs a total of 4000 units      latanoprost (XALATAN) 0.005 % ophthalmic solution Administer 1 Drop to both eyes nightly. One drop at bedtime      levothyroxine (SYNTHROID) 125 mcg tablet Take 125 mcg by mouth Daily (before breakfast).  omeprazole (PRILOSEC) 20 mg capsule Take 20 mg by mouth daily.  ondansetron (ZOFRAN ODT) 4 mg disintegrating tablet Take 4 mg by mouth every eight (8) hours as needed for Nausea or Vomiting. (Patient not taking: Reported on 4/12/2022)      vit B Cmplx 3-FA-Vit C-Biotin (NEPHRO HOWIE RX) 1- mg-mg-mcg tablet Take 1 Tab by mouth daily. Past History     Past Medical History:  Past Medical History:   Diagnosis Date    Anemia in end-stage renal disease ([facility] [address]     Anticoagulated by anticoagulation treatment     On Apixaban    Chronic hypotension     On Midodrine    Chronic pain     Closed fracture of left inferior pubic ramus, with routine healing, subsequent encounter 11/12/2021    Closed fracture of superior pubic ramus, left, with routine healing, subsequent encounter 11/12/2021    Closed nondisplaced fracture of anterior wall of left acetabulum with routine healing 11/12/2021    Depression     End-stage renal disease on hemodialysis ([facility] [address]     HD at [facility] on MWF.  Tel # [phone]    Gastroesophageal reflux disease     Glaucoma     History of acute pyelonephritis 2/20/2020    History of hydronephrosis 10/5/2021    History of infection with vancomycin resistant Enterococcus (VRE) 10/8/2021    Urine culture (collected 10/8/2021, resulted 10/14/2021) yielded growth of >100,000 colonies/ml of Enterococcus faecalis RESISTANT to Ciprofloxacin, Levofloxacin, Tetracycline and Vancomycin    History of kidney stones     History of recurrent urinary tract infection     History of sepsis 6/18/2021    History of septic shock 10/8/2021    History of urethral stricture     History of urinary tract infection 10/8/2021    Urine culture (collected 10/8/2021, resulted 10/14/2021) yielded growth of >100,000 colonies/ml of Enterococcus faecalis RESISTANT to Ciprofloxacin, Levofloxacin, Tetracycline and Vancomycin    Hyperlipidemia     Hyperphosphatemia 11/14/2021    Hypothyroidism     Lung mass     Mononeuropathy     Involving ring finger of left hand    Need for prophylactic isolation 10/8/2021    Urine culture (collected 10/8/2021, resulted 10/14/2021) yielded growth of >100,000 colonies/ml of Enterococcus faecalis RESISTANT to Ciprofloxacin, Levofloxacin, Tetracycline and Vancomycin    Paroxysmal atrial fibrillation (HCC)     Secondary hyperparathyroidism of renal origin ([facility] [address]     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 ([facility] [address]     HbA1c (10/8/2021) = 4.6    Uric acid nephrolithiasis     Urinary incontinence        Past Surgical History:  Past Surgical History:   Procedure Laterality Date    HX APPENDECTOMY      HX CHOLECYSTECTOMY      HX GASTRIC BYPASS      Gastric stapling    HX KNEE ARTHROSCOPY      HX UROLOGICAL      right PCN placement    HX UROLOGICAL  07/23/2018    RIGHT URETEROSCOPY WITH HOLMIUM LASER    IR EXCHANGE NEPHRO PERC LT SI  2/21/2020    IR EXCHANGE NEPHRO PERC RT SI  4/13/2020    IR EXCHANGE NEPHRO PERC RT SI  7/17/2020    IR NEPHROSTOMY PERC RT PLC CATH  SI  10/14/2020    IR NEPHROURETERAL PERC RT PLC CATH NEW ACCESS  SI  4/30/2020    CA INTRO CATH DIALYSIS CIRCUIT DX ANGRPH FLUOR S&I Left 9/24/2020    FISTULOGRAM LEFT/poss permanent catheter placement performed by [facility] M[facility]en [facility] at [facility] CATH LAB    VASCULAR SURGERY PROCEDURE UNLIST      lef AVF       Family History:  Family History   Problem Relation Age of Onset    Heart Surgery Sister        Social History:  Social History     Tobacco Use    Smoking status: Never Smoker    Smokeless tobacco: Never Used   Vaping Use    Vaping Use: Never used   Substance Use Topics    Alcohol use: Never    Drug use: Never       Allergies: Allergies   Allergen Reactions    Albumin, Human 25 % Itching     Headache - severe migraine like, itchy eyes, runny nose    Ciprofloxacin Hives    Cyclopentolate Unknown (comments)    Iron Sucrose Diarrhea    Statins-Hmg-Coa Reductase Inhibitors Other (comments)     Body ache         Review of Systems       Review of Systems   Constitutional: Negative for activity change, fatigue and fever. HENT: Negative for congestion and rhinorrhea. Eyes: Negative for visual disturbance. Respiratory: Negative for shortness of breath. Cardiovascular: Negative for chest pain and palpitations. Gastrointestinal: Negative for abdominal pain, diarrhea, nausea and vomiting. Genitourinary: Negative for dysuria and hematuria. Musculoskeletal: Negative for back pain. Skin: Negative for rash. Neurological: Negative for dizziness, weakness and light-headedness. Psychiatric/Behavioral: Positive for behavioral problems. All other systems reviewed and are negative. Physical Exam     Visit Vitals  BP (!) 108/53 (BP 1 Location: Right upper arm, BP Patient Position: Supine)   Pulse 85   Temp 98.5 °F (36.9 °C)   Resp 16   Ht 5' 2\" (1.575 m)   Wt 94 kg (207 lb 3.7 oz)   SpO2 98%   BMI 37.90 kg/m²         Physical Exam  Vitals and nursing note reviewed. Constitutional:       General: She is not in acute distress. Appearance: She is well-developed.    HENT: Head: Normocephalic and atraumatic. Right Ear: External ear normal.      Left Ear: External ear normal.      Nose: Nose normal.   Eyes:      General: No scleral icterus. Conjunctiva/sclera: Conjunctivae normal.      Pupils: Pupils are equal, round, and reactive to light. Neck:      Thyroid: No thyromegaly. Vascular: No JVD. Trachea: No tracheal deviation. Cardiovascular:      Rate and Rhythm: Normal rate and regular rhythm. Heart sounds: Normal heart sounds. No murmur heard. No friction rub. No gallop. Pulmonary:      Effort: Pulmonary effort is normal.      Breath sounds: Normal breath sounds. Chest:      Chest wall: No tenderness. Abdominal:      General: Bowel sounds are normal. There is no distension. Palpations: Abdomen is soft. Tenderness: There is no abdominal tenderness. There is no guarding or rebound. Musculoskeletal:         General: No tenderness. Normal range of motion. Cervical back: Normal range of motion and neck supple. Comments: Please left arm AV access with thrill    Lymphadenopathy:      Cervical: No cervical adenopathy. Skin:     General: Skin is warm and dry. Neurological:      Mental Status: She is alert and oriented to person, place, and time. Cranial Nerves: No cranial nerve deficit. Coordination: Coordination normal.      Comments: Globally weak, no arm or leg drift, gait not observed   Psychiatric:         Behavior: Behavior normal.         Thought Content: Thought content normal.         Judgment: Judgment normal.      Comments: Admits to feeling down, does not like her quality of life, denies any suicide plan           Diagnostic Study Results     Labs -  No results found for this or any previous visit (from the past 12 hour(s)). Radiologic Studies -   No orders to display         Medical Decision Making   I am the first provider for this patient.     I reviewed the vital signs, available nursing notes, past medical history, past surgical history, family history and social history. Vital Signs-Reviewed the patient's vital signs. Records Reviewed: Nursing Notes, Old Medical Records, Previous Radiology Studies and Previous Laboratory Studies (Time of Review: 2:06 PM)    ED Course: Progress Notes, Reevaluation, and Consults: The patient was seen by crisis and is cleared for discharge. The patient also goes to [facility] and the family medicine team give the patient appointment tomorrow to be seen in the office for goals of care discussion that needs to happen. We will discharge the patient to follow-up with her primary care doctor tomorrow the patient will return if at all worsened or concerned. the patient son is at the bedside and has great insight to her care and said he can watch her closely and feels comfortable with her going home. Cl Cuellar DO 7:19 PM    Workup and recommendations were reviewed with the patient and all questions were answered. The patient understands the plan and will proceed with close outpatient care. I have encouraged the patient to return if at all worsened or concerned. Cl Cuellar DO 7:20 PM        Provider Notes (Medical Decision Making):   MDM  Number of Diagnoses or Management Options  Diagnosis management comments: Patient is a 66-year-old female with a history of end-stage renal disease on dialysis and depression the presents emergency department after making a comment in dialysis that she wanted them to put a bag on her head so she can die. The patient says that she regrets saying that is that she is not suicidal however she is not happy with the quality of her life but she is too scared to go on hospice. Patient says that she is mostly frustrated with her quality of life because she cannot do anything around the house and she lives with her son and they take good care of her however she feels like she is dependent on to many people. Will discuss case with crisis, basic labs, then reevaluate. Procedures        Diagnosis     Clinical Impression:   1. ESRD on dialysis ([address]    2. Depression, unspecified depression type        Disposition: DC    Follow-up Information    None          Patient's Medications   Start Taking    No medications on file   Continue Taking    0.9% SODIUM CHLORIDE SOLP 100 ML WITH IRON SUCROSE 20 MG/ML    50 mg. ACETAMINOPHEN (TYLENOL EXTRA STRENGTH) 500 MG TABLET    Take 1 Tablet by mouth every six (6) hours as needed for Pain. ALLOPURINOL (ZYLOPRIM) 100 MG TABLET    Take 1 Tablet by mouth every Monday, Wednesday, Friday. [after hemodialysis]  Indications: treatment to prevent acute gout attack    AMIODARONE (CORDARONE) 200 MG TABLET    Take 1 Tablet by mouth daily. Indications: prevention of recurrent atrial fibrillation    APIXABAN (ELIQUIS) 5 MG TABLET    Take 1 Tablet by mouth two (2) times a day. ASCORBIC ACID, VITAMIN C, (VITAMIN C) 500 MG TABLET    Take 500 mg by mouth daily. BIOTIN 1,000 MCG CHEW    Take 1 Tab by mouth daily. CALCITRIOL (ROCALTROL) 0.25 MCG CAPSULE    Take 0.25 mcg by mouth daily. CHOLECALCIFEROL (VITAMIN D3) (2,000 UNITS /50 MCG) CAP CAPSULE    Take 2,000 Units by mouth two (2) times a day. Take two tabs a total of 4000 units    CYANOCOBALAMIN 1,000 MCG TABLET    Take 1,000 mcg by mouth daily. DOXERCALCIFEROL (HECTOROL IV)    5 mcg. DULOXETINE (CYMBALTA) 20 MG CAPSULE    Take 20 mg by mouth daily. ESTRADIOL (ESTRACE) 0.01 % (0.1 MG/GRAM) VAGINAL CREAM    Apply a fingertip amount around the urethra three times a week. FLUDROCORTISONE (FLORINEF) 0.1 MG TABLET    Take 1 Tablet by mouth. GABAPENTIN (NEURONTIN) 100 MG CAPSULE    Take 2 Capsules by mouth Three (3) times a week. Max Daily Amount: 200 mg. Take 2 capsules by mouth 3 times a week as needed for pain post dialysis.   Indications: concern for back pain post dialysis    HYDROMORPHONE (DILAUDID) 2 MG TABLET Take 1 mg by mouth every eight (8) hours as needed for Pain. Take 1/2 tablet by mouth every 8 hours as needed for pain level of 5 out of 10 or greater    LACTOBACILLUS SP. 50 BILLION CPU (BIO-K PLUS) 50 BILLION CELL -375 MG CAP CAPSULE    Take 1 Cap by mouth daily. LATANOPROST (XALATAN) 0.005 % OPHTHALMIC SOLUTION    Administer 1 Drop to both eyes nightly. One drop at bedtime    LEVOTHYROXINE (SYNTHROID) 125 MCG TABLET    Take 125 mcg by mouth Daily (before breakfast). LIDOCAINE (LIDODERM) 5 %    Apply patch to the affected area for 12 hours a day and remove for 12 hours a day. MECLIZINE (ANTIVERT) 25 MG TABLET    Take 25 mg by mouth three (3) times daily as needed for Dizziness. MELATONIN 5 MG TABLET    Take 5 mg by mouth nightly. METHOXY PEG-EPOETIN BETA (MIRCERA INJECTION)    30 mcg. MIDODRINE (PROAMATINE) 10 MG TABLET    Take 10 mg by mouth three (3) times daily. OMEPRAZOLE (PRILOSEC) 20 MG CAPSULE    Take 20 mg by mouth daily. ONDANSETRON (ZOFRAN ODT) 4 MG DISINTEGRATING TABLET    Take 4 mg by mouth every eight (8) hours as needed for Nausea or Vomiting. ONDANSETRON HCL (ZOFRAN) 4 MG TABLET        SEVELAMER CARBONATE (RENVELA) 800 MG TAB TAB    Take 2 Tablets by mouth three (3) times daily (with meals). Indications: renal osteodystrophy with hyperphosphatemia    SODIUM ZIRCONIUM CYCLOSILICATE (LOKELMA) 5 GRAM POWDER PACKET    Take 1 Packet by mouth daily. Indications: high levels of potassium in the blood    VIT B CMPLX 3-FA-VIT C-BIOTIN (NEPHRO HOWIE RX) 1- MG-MG-MCG TABLET    Take 1 Tab by mouth daily. These Medications have changed    No medications on file   Stop Taking    No medications on file     Disclaimer: Sections of this note are dictated using utilizing voice recognition software. Minor typographical errors may be present. If questions arise, please do not hesitate to contact me or call our department.

## 2022-05-02 NOTE — ED TRIAGE NOTES
Per EMS, Patient was at dialysis when she said that she wanted to hurt herself. Patient is A&O x4, no signs of distress noted. Patient denies any actual thoughts of hurting herself.

## 2022-05-04 ENCOUNTER — TELEPHONE (OUTPATIENT)
Dept: CARDIOLOGY CLINIC | Age: 77
End: 2022-05-04

## 2022-05-04 NOTE — TELEPHONE ENCOUNTER
[facility] Heart Program  [facility] [address]  [id number]   (T) [phone]  (P) [phone]     Appointment/Procedure Confirmation     Pt. Called and Appointment  confirmed for Visit at 11:00 with Glenna Carlin MD . Pt. In[facility]ed to [facility] and check in at the  located inside the [address] [place] [address] entrance at 11:00 , 15 minutes prior to appointment time and instructed to Insurance cards, a picture ID, payment method, and all medications. Left message on voicemail.

## 2022-05-05 ENCOUNTER — OFFICE VISIT (OUTPATIENT)
Dept: CARDIOTHORACIC SURGERY | Age: 77
End: 2022-05-05
Payer: MEDICARE

## 2022-05-05 VITALS
HEART RATE: 65 BPM | WEIGHT: 207 LBS | HEIGHT: 62 IN | BODY MASS INDEX: 38.09 KG/M2 | SYSTOLIC BLOOD PRESSURE: 100 MMHG | RESPIRATION RATE: 97 BRPM | DIASTOLIC BLOOD PRESSURE: 49 MMHG

## 2022-05-05 DIAGNOSIS — R07.9 CHEST PAIN, UNSPECIFIED TYPE: Primary | ICD-10-CM

## 2022-05-05 PROCEDURE — G8536 NO DOC ELDER MAL SCRN: HCPCS | Performed by: STUDENT IN AN ORGANIZED HEALTH CARE EDUCATION/TRAINING PROGRAM

## 2022-05-05 PROCEDURE — G8399 PT W/DXA RESULTS DOCUMENT: HCPCS | Performed by: STUDENT IN AN ORGANIZED HEALTH CARE EDUCATION/TRAINING PROGRAM

## 2022-05-05 PROCEDURE — 99204 OFFICE O/P NEW MOD 45 MIN: CPT | Performed by: STUDENT IN AN ORGANIZED HEALTH CARE EDUCATION/TRAINING PROGRAM

## 2022-05-05 PROCEDURE — 1101F PT FALLS ASSESS-DOCD LE1/YR: CPT | Performed by: STUDENT IN AN ORGANIZED HEALTH CARE EDUCATION/TRAINING PROGRAM

## 2022-05-05 PROCEDURE — 1090F PRES/ABSN URINE INCON ASSESS: CPT | Performed by: STUDENT IN AN ORGANIZED HEALTH CARE EDUCATION/TRAINING PROGRAM

## 2022-05-05 PROCEDURE — G8417 CALC BMI ABV UP PARAM F/U: HCPCS | Performed by: STUDENT IN AN ORGANIZED HEALTH CARE EDUCATION/TRAINING PROGRAM

## 2022-05-05 PROCEDURE — G9717 DOC PT DX DEP/BP F/U NT REQ: HCPCS | Performed by: STUDENT IN AN ORGANIZED HEALTH CARE EDUCATION/TRAINING PROGRAM

## 2022-05-05 PROCEDURE — G8427 DOCREV CUR MEDS BY ELIG CLIN: HCPCS | Performed by: STUDENT IN AN ORGANIZED HEALTH CARE EDUCATION/TRAINING PROGRAM

## 2022-05-05 NOTE — PROGRESS NOTES
[facility]  Cardiovascular & Thoracic Surgery  Structural Heart Program  [facility] [address]  [id number]   (N) [phone] (i) [phone]       New Office Patient Visit  Date of Visit: 05/05/22   Referring Physician: Rick Amin MD  [address] [facility]  [address],  [address]    History of Present Illness: Ms. Carson Roblero is a 64year old female with PMHX of chronic hypotension on midodrine, DM2 HLD, ESRD on HD, chronic anemia of chronic disease, pAF on eliquis, hypothyroidism who presents after being referred by Dr. Aleksey Garcia for further evaluation of her chest discomfort and to be evaluated for possible watchman down the road. She reports not feeling well for 6-8 months with exertional chest pressure and SOB. She reports overall feeling down from her quality of life. She moved down here from [place] to live with her son and MARÍA ELENA and denies any depression however is frustrated with her current state of health and limitations from her symptoms. Given chronic anemia in setting of ESRD and needing eliquis long term, she expresses wishes to come off eliquis in near future. Denies any nausea, vomiting, abdominal pain, fever, chills, sputum production. No hematuria or other bleeding complaints    Review of Systems:  Cardiac symptoms as noted above in HPI. All others negative. Denies fatigue, malaise, skin rash, joint pain, blurring vision, photophobia, neck pain, hemoptysis, chronic cough, nausea, vomiting, hematuria, burning micturition, BRBPR, chronic headaches.     Past Medical History:  Past Medical History:   Diagnosis Date    Anemia in end-stage renal disease ([facility]     Anticoagulated by anticoagulation treatment     On Apixaban    Chronic hypotension     On Midodrine    Chronic pain     Closed fracture of left inferior pubic ramus, with routine healing, subsequent encounter 11/12/2021    Closed fracture of superior pubic ramus, left, with routine healing, subsequent encounter 11/12/2021    Closed nondisplaced fracture of anterior wall of left acetabulum with routine healing 11/12/2021    Depression     End-stage renal disease on hemodialysis ([facility]     HD at [address] [facility] on MWF.  Tel # [phone]    Gastroesophageal reflux disease     Glaucoma     History of acute pyelonephritis 2/20/2020    History of hydronephrosis 10/5/2021    History of infection with vancomycin resistant Enterococcus (VRE) 10/8/2021    Urine culture (collected 10/8/2021, resulted 10/14/2021) yielded growth of >100,000 colonies/ml of Enterococcus faecalis RESISTANT to Ciprofloxacin, Levofloxacin, Tetracycline and Vancomycin    History of kidney stones     History of recurrent urinary tract infection     History of sepsis 6/18/2021    History of septic shock 10/8/2021    History of urethral stricture     History of urinary tract infection 10/8/2021    Urine culture (collected 10/8/2021, resulted 10/14/2021) yielded growth of >100,000 colonies/ml of Enterococcus faecalis RESISTANT to Ciprofloxacin, Levofloxacin, Tetracycline and Vancomycin    Hyperlipidemia     Hyperphosphatemia 11/14/2021    Hypothyroidism     Lung mass     Mononeuropathy     Involving ring finger of left hand    Need for prophylactic isolation 10/8/2021    Urine culture (collected 10/8/2021, resulted 10/14/2021) yielded growth of >100,000 colonies/ml of Enterococcus faecalis RESISTANT to Ciprofloxacin, Levofloxacin, Tetracycline and Vancomycin    Paroxysmal atrial fibrillation (HCC)     Secondary hyperparathyroidism of renal origin ([facility]     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 ([facility]     HbA1c (10/8/2021) = 4.6    Uric acid nephrolithiasis     Urinary incontinence        Past Surgical History:  Past Surgical History:   Procedure Laterality Date    HX APPENDECTOMY      HX CHOLECYSTECTOMY      HX GASTRIC BYPASS      Gastric stapling    HX KNEE ARTHROSCOPY      HX UROLOGICAL right PCN placement    HX UROLOGICAL  07/23/2018    RIGHT URETEROSCOPY WITH HOLMIUM LASER    IR EXCHANGE NEPHRO PERC LT SI  2/21/2020    IR EXCHANGE NEPHRO PERC RT SI  4/13/2020    IR EXCHANGE NEPHRO PERC RT SI  7/17/2020    IR NEPHROSTOMY PERC RT PLC CATH  SI  10/14/2020    IR NEPHROURETERAL PERC RT PLC CATH NEW ACCESS  SI  4/30/2020    MO INTRO CATH DIALYSIS CIRCUIT DX ANGRPH FLUOR S&I Left 9/24/2020    FISTULOGRAM LEFT/poss permanent catheter placement performed by Hannah Stern MD at [facility] CATH LAB    VASCULAR SURGERY PROCEDURE UNLIST      lef AVF       Medications:  Current Outpatient Medications   Medication Sig    doxercalciferol (HECTOROL IV) 5 mcg.  midodrine (PROAMATINE) 10 mg tablet Take 10 mg by mouth three (3) times daily.  ondansetron hcl (ZOFRAN) 4 mg tablet     melatonin 5 mg tablet Take 5 mg by mouth nightly.  HYDROmorphone (DILAUDID) 2 mg tablet Take 1 mg by mouth every eight (8) hours as needed for Pain. Take 1/2 tablet by mouth every 8 hours as needed for pain level of 5 out of 10 or greater    allopurinoL (ZYLOPRIM) 100 mg tablet Take 1 Tablet by mouth every Monday, Wednesday, Friday. [after hemodialysis]  Indications: treatment to prevent acute gout attack    amiodarone (CORDARONE) 200 mg tablet Take 1 Tablet by mouth daily. Indications: prevention of recurrent atrial fibrillation    sevelamer carbonate (RENVELA) 800 mg tab tab Take 2 Tablets by mouth three (3) times daily (with meals). Indications: renal osteodystrophy with hyperphosphatemia    sodium zirconium cyclosilicate (LOKELMA) 5 gram powder packet Take 1 Packet by mouth daily. Indications: high levels of potassium in the blood    acetaminophen (Tylenol Extra Strength) 500 mg tablet Take 1 Tablet by mouth every six (6) hours as needed for Pain.  lidocaine (LIDODERM) 5 % Apply patch to the affected area for 12 hours a day and remove for 12 hours a day.     gabapentin (NEURONTIN) 100 mg capsule Take 2 Capsules by mouth Three (3) times a week. Max Daily Amount: 200 mg. Take 2 capsules by mouth 3 times a week as needed for pain post dialysis. Indications: concern for back pain post dialysis (Patient taking differently: Take 100 mg by mouth daily. taking 1 pill at night before bed  Indications: concern for back pain post dialysis)    apixaban (ELIQUIS) 5 mg tablet Take 1 Tablet by mouth two (2) times a day.  meclizine (ANTIVERT) 25 mg tablet Take 25 mg by mouth three (3) times daily as needed for Dizziness.  methoxy peg-epoetin beta (MIRCERA INJECTION) 30 mcg.  DULoxetine (Cymbalta) 20 mg capsule Take 20 mg by mouth daily.  estradioL (Estrace) 0.01 % (0.1 mg/gram) vaginal cream Apply a fingertip amount around the urethra three times a week.  biotin 1,000 mcg chew Take 1 Tab by mouth daily.  cyanocobalamin 1,000 mcg tablet Take 1,000 mcg by mouth daily.  ascorbic acid, vitamin C, (VITAMIN C) 500 mg tablet Take 500 mg by mouth daily.  calcitRIOL (ROCALTROL) 0.25 mcg capsule Take 0.25 mcg by mouth daily.  cholecalciferol (VITAMIN D3) (2,000 UNITS /50 MCG) cap capsule Take 2,000 Units by mouth two (2) times a day. Take two tabs a total of 4000 units    latanoprost (XALATAN) 0.005 % ophthalmic solution Administer 1 Drop to both eyes nightly. One drop at bedtime    levothyroxine (SYNTHROID) 125 mcg tablet Take 125 mcg by mouth Daily (before breakfast).  omeprazole (PRILOSEC) 20 mg capsule Take 20 mg by mouth daily.  ondansetron (ZOFRAN ODT) 4 mg disintegrating tablet Take 4 mg by mouth every eight (8) hours as needed for Nausea or Vomiting.  vit B Cmplx 3-FA-Vit C-Biotin (NEPHRO HOWIE RX) 1- mg-mg-mcg tablet Take 1 Tab by mouth daily.  0.9% sodium chloride solp 100 mL with iron sucrose 20 MG/ML 50 mg. (Patient not taking: Reported on 4/12/2022)    fludrocortisone (FLORINEF) 0.1 mg tablet Take 1 Tablet by mouth.  (Patient not taking: Reported on 4/12/2022)    lactobacillus sp. 50 billion cpu (BIO-K PLUS) 50 billion cell -375 mg cap capsule Take 1 Cap by mouth daily. (Patient not taking: Reported on 4/12/2022)     No current facility-administered medications for this visit. Allergies and Sensitivities:  Allergies   Allergen Reactions    Albumin, Human 25 % Itching     Headache - severe migraine like, itchy eyes, runny nose    Ciprofloxacin Hives    Cyclopentolate Unknown (comments)    Iron Sucrose Diarrhea    Statins-Hmg-Coa Reductase Inhibitors Other (comments)     Body ache       Family History:  Family History   Problem Relation Age of Onset    Heart Surgery Sister        Social History:  Social History     Tobacco Use    Smoking status: Never Smoker    Smokeless tobacco: Never Used   Vaping Use    Vaping Use: Never used   Substance Use Topics    Alcohol use: Never    Drug use: Never     She  reports that she has never smoked. She has never used smokeless tobacco.  She  reports no history of alcohol use. Physical Exam:  BP Readings from Last 3 Encounters:   05/05/22 (!) 100/49   05/02/22 (!) 108/53   04/12/22 (!) 93/51         Pulse Readings from Last 3 Encounters:   05/05/22 65   05/02/22 85   04/12/22 (!) 58          Wt Readings from Last 3 Encounters:   05/05/22 93.9 kg (207 lb)   05/02/22 94 kg (207 lb 3.7 oz)   04/12/22 92.5 kg (203 lb 14.4 oz)       Constitutional: Oriented to person, place, and time. Uses a walker for support. HENT: Head: Normocephalic and atraumatic. Eyes: Conjunctivae and extraocular motions are normal.   Neck: No JVD present. Carotid bruit is not appreciated. Cardiovascular: Regular rhythm. No murmur, gallop or rubs appreciated  Lung: Breath sounds normal. No respiratory distress. No ronchi or rales appreciated  Abdominal: No tenderness. No rebound and no guarding. Musculoskeletal: There is no lower extremity edema. No cynosis  Lymphadenopathy:  No cervical or supraclavicular adenopathy appriciated.    Neurological: No gross motor deficit noted. Skin: No visible skin rash noted. No Ear discharge noted  Psychiatric: Normal mood and affect. LABS:     Lab Results   Component Value Date/Time    WBC 5.3 05/02/2022 02:15 PM    Hemoglobin, POC 8.8 (L) 09/24/2020 08:45 AM    HGB 11.2 (L) 05/02/2022 02:15 PM    Hematocrit, POC 26 (L) 09/24/2020 08:45 AM    HCT 34.8 (L) 05/02/2022 02:15 PM    PLATELET 751 00/76/5293 02:15 PM    .1 (H) 05/02/2022 02:15 PM     Lab Results   Component Value Date/Time    Sodium 139 05/02/2022 02:15 PM    Potassium 4.2 05/02/2022 02:15 PM    Chloride 101 05/02/2022 02:15 PM    CO2 32 05/02/2022 02:15 PM    Glucose 97 05/02/2022 02:15 PM    BUN 25 (H) 05/02/2022 02:15 PM    Creatinine 4.40 (H) 05/02/2022 02:15 PM     No flowsheet data found. Lab Results   Component Value Date/Time    ALT (SGPT) 21 03/03/2022 01:05 PM     Lab Results   Component Value Date/Time    Hemoglobin A1c 4.3 04/12/2022 12:46 PM     Lab Results   Component Value Date/Time    TSH 1.69 11/22/2021 05:27 AM       10/08/21    ECHO ADULT FOLLOW-UP OR LIMITED 10/10/2021 10/10/2021    Interpretation Summary  · LV: Estimated LVEF is 55 - 60%. Visually measured ejection fraction. Normal cavity size and systolic function (ejection fraction normal). Mild concentric hypertrophy. Wall motion: normal. Age-appropriate left ventricular diastolic function. · PA: Pulmonary arterial systolic pressure is 35 mmHg. Signed by: Cris Whitney MD on 10/10/2021 11:57 AM        11/11/20    NUCLEAR CARDIAC STRESS TEST 11/11/2020 11/11/2020    Interpretation Summary  · Baseline ECG: Normal sinus rhythm. · Negative stress test.  · Gated SPECT: Left ventricular function post-stress was normal. Calculated ejection fraction is 67%. There is no evidence of transient ischemic dilation (TID). The TID ratio is 0.91.  · Myocardial perfusion imaging supports a low risk stress test.  · Left ventricular perfusion is normal.    Assessment: Ms. Carson Roblero is a 64year old female with PMHX of chronic hypotension on midodrine, DM2 HLD, ESRD on HD, chronic anemia of chronic disease, pAF on eliquis, hypothyroidism who presents after being referred by Dr. Mary Ellen Weiner for further evaluation of her chest discomfort and to be evaluated for possible watchman down the road. IMPRESSION & PLAN:    -Given her cardiac symptoms, we will initially plan for LHC with possible RHC in the next couple of weeks.   -We will obtain Echo results from Dr. Khoury Channel office  -Plan for watchman down the road with CT watchman protocol for planning. This plan was discussed with patient who is in agreement. Shared decision making was utilized between the physician/provider and patient/family in regards to chest discomfort. Thank you for allowing me to participate in patient care. Please feel free to call me if you have any question or concern. Maday Gray MD, [facility], [facility]    Please note: This document has been produced using voice recognition software. Unrecognized errors in transcription may be present.

## 2022-05-05 NOTE — PATIENT INSTRUCTIONS
Instructions    Patients Name:  Garcia Black    1. You are scheduled to have a Cath on   at [facility] Please check in at  ____________ . 2. Please go to [facility] and park in the outpatient parking lot that is located around to the back of the hospital and enter through the [facility] building. Once you enter through the [facility] check in with the  there. The  will either give you directions or assist you in getting to the cath holding area. 3. You are not to eat anything after midnight before the procedure. Please continue to drink fluids up until 12:00.  (water, juice, black coffee, soft drinks). Do not drink milk or milk products or anything with cream. You may take medications(except for diabetes) with a small sip of water before 6am on the day of the procedure. .    4. If you are diabetic, do not take your insulin/sugar pill the morning of the procedure. 5. MEDICATION INSTRUCTIONS:   Please take your morning medications with the following special instructions:      [x]          Please make sure to take your Blood pressure medication : With just enough water to swallow. [x]          Take your Aspirin and/or Plavix. [x]          Stop your Eliquis on 48 hours prior to cath  and do not resume it until after the procedure.      []          Take Prednisone 60 mg and Benadryl 25 mg by mouth at Bedtime on  and again on  at . This is to prevent you from having an allergic reaction to the dye. 6. We encourage families to wait in the waiting room on the first floor while the procedure is being done. The Doctor will come out and talk with you as soon as the procedure is over. 7. There is the possibility that you may spend the night in the hospital, depending on the results of the procedure. This will be determined after the procedure is done. If angioplasty or stent is planned, you will stay at least one day.      8. If you or your family have any questions, please call our office Monday -Friday, 9:00 a.m.-4:30 p.m.,  At [phone], and ask to speak to one of the nurses.

## 2022-05-05 NOTE — LETTER
5/14/2022    Patient: Vidal Ortiz   YOB: 1945   Date of Visit: 5/5/2022     Gee Sarabia MD  [id number] [facility]    Dear Gee Sarabia MD,      Thank you for referring Ms. Randee Stern to [facility] for evaluation. My notes for this consultation are attached. If you have questions, please do not hesitate to call me. I look forward to following your patient along with you.       Sincerely,    Nii No MD

## 2022-05-13 ENCOUNTER — TELEPHONE (OUTPATIENT)
Dept: CARDIOLOGY CLINIC | Age: 77
End: 2022-05-13

## 2022-05-24 ENCOUNTER — TELEPHONE (OUTPATIENT)
Dept: CARDIOTHORACIC SURGERY | Age: 77
End: 2022-05-24

## 2022-05-24 NOTE — TELEPHONE ENCOUNTER
----- Message from Kevyn James MD sent at 5/24/2022  4:33 PM EDT -----  Regarding: RE: [facility] 5/31/22  Thank you but not needed. ----- Message -----  From: Macy Isabel RN  Sent: 5/24/2022   3:55 PM EDT  To: Chana Bell. Brothers, Kevyn James MD  Subject: [facility] 5/31/22                                      Pt. Has a LHC scheduled with you on 5/31/22. She is a dialysis patient and is followed by   Shruthi Dumont MD. Is there any coordination needed regarding dialysis prior to her cath? Please advise, I am more than happy to assist with any necessary calls or coordination. Thank you.      Shruthi Dumont MD  [phone] (Work)  [phone] (Fax)  [address]

## 2022-05-25 ENCOUNTER — HOSPITAL ENCOUNTER (OUTPATIENT)
Age: 77
Setting detail: OBSERVATION
Discharge: HOME HEALTH | End: 2022-05-27
Attending: EMERGENCY MEDICINE | Admitting: STUDENT IN AN ORGANIZED HEALTH CARE EDUCATION/TRAINING PROGRAM
Payer: MEDICARE

## 2022-05-25 ENCOUNTER — APPOINTMENT (OUTPATIENT)
Dept: GENERAL RADIOLOGY | Age: 77
End: 2022-05-25
Attending: EMERGENCY MEDICINE
Payer: MEDICARE

## 2022-05-25 ENCOUNTER — APPOINTMENT (OUTPATIENT)
Dept: NON INVASIVE DIAGNOSTICS | Age: 77
End: 2022-05-25
Attending: PHYSICIAN ASSISTANT
Payer: MEDICARE

## 2022-05-25 ENCOUNTER — TELEPHONE (OUTPATIENT)
Dept: CARDIOLOGY CLINIC | Age: 77
End: 2022-05-25

## 2022-05-25 DIAGNOSIS — D63.1 ANEMIA IN END-STAGE RENAL DISEASE (HCC): Chronic | ICD-10-CM

## 2022-05-25 DIAGNOSIS — N18.6 ANEMIA IN END-STAGE RENAL DISEASE (HCC): Chronic | ICD-10-CM

## 2022-05-25 DIAGNOSIS — G58.9 MONONEUROPATHY: Chronic | ICD-10-CM

## 2022-05-25 DIAGNOSIS — I25.10 CORONARY ARTERY DISEASE, UNSPECIFIED VESSEL OR LESION TYPE, UNSPECIFIED WHETHER ANGINA PRESENT, UNSPECIFIED WHETHER NATIVE OR TRANSPLANTED HEART: ICD-10-CM

## 2022-05-25 DIAGNOSIS — I95.89 CHRONIC HYPOTENSION: Chronic | ICD-10-CM

## 2022-05-25 DIAGNOSIS — I48.0 PAROXYSMAL ATRIAL FIBRILLATION (HCC): Primary | ICD-10-CM

## 2022-05-25 DIAGNOSIS — R06.02 SOB (SHORTNESS OF BREATH) ON EXERTION: ICD-10-CM

## 2022-05-25 DIAGNOSIS — R07.9 CHEST PAIN, UNSPECIFIED TYPE: Primary | ICD-10-CM

## 2022-05-25 PROBLEM — Z99.2 ESRD ON DIALYSIS (HCC): Status: ACTIVE | Noted: 2022-01-01

## 2022-05-25 LAB
ALBUMIN SERPL-MCNC: 3.3 G/DL (ref 3.4–5)
ALBUMIN/GLOB SERPL: 1 {RATIO} (ref 0.8–1.7)
ALP SERPL-CCNC: 95 U/L (ref 45–117)
ALT SERPL-CCNC: 16 U/L (ref 13–56)
ANION GAP SERPL CALC-SCNC: 9 MMOL/L (ref 3–18)
AST SERPL-CCNC: 10 U/L (ref 10–38)
ATRIAL RATE: 71 BPM
BASOPHILS # BLD: 0.1 K/UL (ref 0–0.1)
BASOPHILS NFR BLD: 1 % (ref 0–2)
BILIRUB SERPL-MCNC: 0.4 MG/DL (ref 0.2–1)
BUN SERPL-MCNC: 31 MG/DL (ref 7–18)
BUN/CREAT SERPL: 5 (ref 12–20)
CALCIUM SERPL-MCNC: 9.1 MG/DL (ref 8.5–10.1)
CALCULATED R AXIS, ECG10: -26 DEGREES
CALCULATED T AXIS, ECG11: 31 DEGREES
CHLORIDE SERPL-SCNC: 102 MMOL/L (ref 100–111)
CO2 SERPL-SCNC: 30 MMOL/L (ref 21–32)
CREAT SERPL-MCNC: 6.72 MG/DL (ref 0.6–1.3)
DIAGNOSIS, 93000: NORMAL
DIFFERENTIAL METHOD BLD: ABNORMAL
ECHO LV FRACTIONAL SHORTENING: 36 % (ref 28–44)
ECHO LV INTERNAL DIMENSION DIASTOLE INDEX: 2.88 CM/M2
ECHO LV INTERNAL DIMENSION DIASTOLIC: 5.5 CM (ref 3.9–5.3)
ECHO LV INTERNAL DIMENSION SYSTOLIC INDEX: 1.83 CM/M2
ECHO LV INTERNAL DIMENSION SYSTOLIC: 3.5 CM
ECHO LV IVSD: 0.9 CM (ref 0.6–0.9)
ECHO LV MASS 2D: 227.4 G (ref 67–162)
ECHO LV MASS INDEX 2D: 119.1 G/M2 (ref 43–95)
ECHO LV POSTERIOR WALL DIASTOLIC: 1.2 CM (ref 0.6–0.9)
ECHO LV RELATIVE WALL THICKNESS RATIO: 0.44
ECHO TV REGURGITANT MAX VELOCITY: 2.25 M/S
ECHO TV REGURGITANT PEAK GRADIENT: 20 MMHG
EOSINOPHIL # BLD: 0.1 K/UL (ref 0–0.4)
EOSINOPHIL NFR BLD: 2 % (ref 0–5)
ERYTHROCYTE [DISTWIDTH] IN BLOOD BY AUTOMATED COUNT: 14.6 % (ref 11.6–14.5)
GLOBULIN SER CALC-MCNC: 3.3 G/DL (ref 2–4)
GLUCOSE SERPL-MCNC: 100 MG/DL (ref 74–99)
HCT VFR BLD AUTO: 28.8 % (ref 35–45)
HGB BLD-MCNC: 9.1 G/DL (ref 12–16)
IMM GRANULOCYTES # BLD AUTO: 0.1 K/UL (ref 0–0.04)
IMM GRANULOCYTES NFR BLD AUTO: 1 % (ref 0–0.5)
LYMPHOCYTES # BLD: 1.1 K/UL (ref 0.9–3.6)
LYMPHOCYTES NFR BLD: 23 % (ref 21–52)
MCH RBC QN AUTO: 32.7 PG (ref 24–34)
MCHC RBC AUTO-ENTMCNC: 31.6 G/DL (ref 31–37)
MCV RBC AUTO: 103.6 FL (ref 78–100)
MONOCYTES # BLD: 0.6 K/UL (ref 0.05–1.2)
MONOCYTES NFR BLD: 12 % (ref 3–10)
NEUTS SEG # BLD: 3.1 K/UL (ref 1.8–8)
NEUTS SEG NFR BLD: 61 % (ref 40–73)
NRBC # BLD: 0 K/UL (ref 0–0.01)
NRBC BLD-RTO: 0 PER 100 WBC
PLATELET # BLD AUTO: 216 K/UL (ref 135–420)
PMV BLD AUTO: 10 FL (ref 9.2–11.8)
POTASSIUM SERPL-SCNC: 4.6 MMOL/L (ref 3.5–5.5)
PROT SERPL-MCNC: 6.6 G/DL (ref 6.4–8.2)
Q-T INTERVAL, ECG07: 428 MS
QRS DURATION, ECG06: 96 MS
QTC CALCULATION (BEZET), ECG08: 448 MS
RBC # BLD AUTO: 2.78 M/UL (ref 4.2–5.3)
SODIUM SERPL-SCNC: 141 MMOL/L (ref 136–145)
T4 FREE SERPL-MCNC: 1.5 NG/DL (ref 0.7–1.5)
TROPONIN-HIGH SENSITIVITY: 17 NG/L (ref 0–54)
TSH SERPL DL<=0.05 MIU/L-ACNC: 0.6 UIU/ML (ref 0.36–3.74)
VENTRICULAR RATE, ECG03: 66 BPM
WBC # BLD AUTO: 5 K/UL (ref 4.6–13.2)

## 2022-05-25 PROCEDURE — 99153 MOD SED SAME PHYS/QHP EA: CPT | Performed by: STUDENT IN AN ORGANIZED HEALTH CARE EDUCATION/TRAINING PROGRAM

## 2022-05-25 PROCEDURE — 74011250636 HC RX REV CODE- 250/636: Performed by: STUDENT IN AN ORGANIZED HEALTH CARE EDUCATION/TRAINING PROGRAM

## 2022-05-25 PROCEDURE — 74011000250 HC RX REV CODE- 250: Performed by: STUDENT IN AN ORGANIZED HEALTH CARE EDUCATION/TRAINING PROGRAM

## 2022-05-25 PROCEDURE — 77030013797 HC KT TRNSDUC PRSSR EDWD -A: Performed by: STUDENT IN AN ORGANIZED HEALTH CARE EDUCATION/TRAINING PROGRAM

## 2022-05-25 PROCEDURE — G0257 UNSCHED DIALYSIS ESRD PT HOS: HCPCS

## 2022-05-25 PROCEDURE — 74011000636 HC RX REV CODE- 636: Performed by: STUDENT IN AN ORGANIZED HEALTH CARE EDUCATION/TRAINING PROGRAM

## 2022-05-25 PROCEDURE — C1894 INTRO/SHEATH, NON-LASER: HCPCS | Performed by: STUDENT IN AN ORGANIZED HEALTH CARE EDUCATION/TRAINING PROGRAM

## 2022-05-25 PROCEDURE — 93321 DOPPLER ECHO F-UP/LMTD STD: CPT

## 2022-05-25 PROCEDURE — 85025 COMPLETE CBC W/AUTO DIFF WBC: CPT

## 2022-05-25 PROCEDURE — 99285 EMERGENCY DEPT VISIT HI MDM: CPT

## 2022-05-25 PROCEDURE — C1760 CLOSURE DEV, VASC: HCPCS | Performed by: STUDENT IN AN ORGANIZED HEALTH CARE EDUCATION/TRAINING PROGRAM

## 2022-05-25 PROCEDURE — 65270000029 HC RM PRIVATE

## 2022-05-25 PROCEDURE — 84439 ASSAY OF FREE THYROXINE: CPT

## 2022-05-25 PROCEDURE — 74011250637 HC RX REV CODE- 250/637: Performed by: PHYSICIAN ASSISTANT

## 2022-05-25 PROCEDURE — 93005 ELECTROCARDIOGRAM TRACING: CPT

## 2022-05-25 PROCEDURE — 93460 R&L HRT ART/VENTRICLE ANGIO: CPT | Performed by: STUDENT IN AN ORGANIZED HEALTH CARE EDUCATION/TRAINING PROGRAM

## 2022-05-25 PROCEDURE — 71045 X-RAY EXAM CHEST 1 VIEW: CPT

## 2022-05-25 PROCEDURE — 74011250637 HC RX REV CODE- 250/637: Performed by: STUDENT IN AN ORGANIZED HEALTH CARE EDUCATION/TRAINING PROGRAM

## 2022-05-25 PROCEDURE — 77030015766: Performed by: STUDENT IN AN ORGANIZED HEALTH CARE EDUCATION/TRAINING PROGRAM

## 2022-05-25 PROCEDURE — 80053 COMPREHEN METABOLIC PANEL: CPT

## 2022-05-25 PROCEDURE — 84443 ASSAY THYROID STIM HORMONE: CPT

## 2022-05-25 PROCEDURE — 90935 HEMODIALYSIS ONE EVALUATION: CPT

## 2022-05-25 PROCEDURE — G0378 HOSPITAL OBSERVATION PER HR: HCPCS

## 2022-05-25 PROCEDURE — 84484 ASSAY OF TROPONIN QUANT: CPT

## 2022-05-25 PROCEDURE — 77030029997 HC DEV COM RDL R BND TELE -B: Performed by: STUDENT IN AN ORGANIZED HEALTH CARE EDUCATION/TRAINING PROGRAM

## 2022-05-25 PROCEDURE — 84480 ASSAY TRIIODOTHYRONINE (T3): CPT

## 2022-05-25 PROCEDURE — 99152 MOD SED SAME PHYS/QHP 5/>YRS: CPT | Performed by: STUDENT IN AN ORGANIZED HEALTH CARE EDUCATION/TRAINING PROGRAM

## 2022-05-25 PROCEDURE — C1751 CATH, INF, PER/CENT/MIDLINE: HCPCS | Performed by: STUDENT IN AN ORGANIZED HEALTH CARE EDUCATION/TRAINING PROGRAM

## 2022-05-25 RX ORDER — ACETAMINOPHEN 650 MG/1
650 SUPPOSITORY RECTAL
Status: DISCONTINUED | OUTPATIENT
Start: 2022-05-25 | End: 2022-05-27 | Stop reason: HOSPADM

## 2022-05-25 RX ORDER — POLYETHYLENE GLYCOL 3350 17 G/17G
17 POWDER, FOR SOLUTION ORAL DAILY PRN
Status: DISCONTINUED | OUTPATIENT
Start: 2022-05-25 | End: 2022-05-27 | Stop reason: HOSPADM

## 2022-05-25 RX ORDER — FLUDROCORTISONE ACETATE 0.1 MG/1
0.1 TABLET ORAL DAILY
Status: DISCONTINUED | OUTPATIENT
Start: 2022-05-26 | End: 2022-05-27 | Stop reason: HOSPADM

## 2022-05-25 RX ORDER — SODIUM CHLORIDE 0.9 % (FLUSH) 0.9 %
5-40 SYRINGE (ML) INJECTION AS NEEDED
Status: DISCONTINUED | OUTPATIENT
Start: 2022-05-25 | End: 2022-05-27 | Stop reason: HOSPADM

## 2022-05-25 RX ORDER — AMIODARONE HYDROCHLORIDE 200 MG/1
200 TABLET ORAL DAILY
Status: DISCONTINUED | OUTPATIENT
Start: 2022-05-26 | End: 2022-05-25

## 2022-05-25 RX ORDER — LIDOCAINE 4 G/100G
1 PATCH TOPICAL DAILY
Status: DISCONTINUED | OUTPATIENT
Start: 2022-05-26 | End: 2022-05-27 | Stop reason: HOSPADM

## 2022-05-25 RX ORDER — VERAPAMIL HYDROCHLORIDE 2.5 MG/ML
INJECTION, SOLUTION INTRAVENOUS AS NEEDED
Status: DISCONTINUED | OUTPATIENT
Start: 2022-05-25 | End: 2022-05-25 | Stop reason: HOSPADM

## 2022-05-25 RX ORDER — SEVELAMER CARBONATE 800 MG/1
1600 TABLET, FILM COATED ORAL
Status: DISCONTINUED | OUTPATIENT
Start: 2022-05-25 | End: 2022-05-27 | Stop reason: HOSPADM

## 2022-05-25 RX ORDER — HEPARIN SODIUM 5000 [USP'U]/ML
5000 INJECTION, SOLUTION INTRAVENOUS; SUBCUTANEOUS EVERY 8 HOURS
Status: DISCONTINUED | OUTPATIENT
Start: 2022-05-25 | End: 2022-05-25

## 2022-05-25 RX ORDER — MIDAZOLAM HYDROCHLORIDE 1 MG/ML
INJECTION, SOLUTION INTRAMUSCULAR; INTRAVENOUS AS NEEDED
Status: DISCONTINUED | OUTPATIENT
Start: 2022-05-25 | End: 2022-05-25 | Stop reason: HOSPADM

## 2022-05-25 RX ORDER — SODIUM CHLORIDE 0.9 % (FLUSH) 0.9 %
5-40 SYRINGE (ML) INJECTION EVERY 8 HOURS
Status: DISCONTINUED | OUTPATIENT
Start: 2022-05-25 | End: 2022-05-27 | Stop reason: HOSPADM

## 2022-05-25 RX ORDER — HEPARIN SODIUM 200 [USP'U]/100ML
INJECTION, SOLUTION INTRAVENOUS
Status: COMPLETED | OUTPATIENT
Start: 2022-05-25 | End: 2022-05-25

## 2022-05-25 RX ORDER — CALCITRIOL 0.25 UG/1
0.25 CAPSULE ORAL DAILY
Status: DISCONTINUED | OUTPATIENT
Start: 2022-05-26 | End: 2022-05-27 | Stop reason: HOSPADM

## 2022-05-25 RX ORDER — ALLOPURINOL 100 MG/1
100 TABLET ORAL
Status: DISCONTINUED | OUTPATIENT
Start: 2022-05-25 | End: 2022-05-27 | Stop reason: HOSPADM

## 2022-05-25 RX ORDER — GABAPENTIN 100 MG/1
100 CAPSULE ORAL
Status: DISCONTINUED | OUTPATIENT
Start: 2022-05-25 | End: 2022-05-27 | Stop reason: HOSPADM

## 2022-05-25 RX ORDER — MIDODRINE HYDROCHLORIDE 5 MG/1
10 TABLET ORAL
Status: DISCONTINUED | OUTPATIENT
Start: 2022-05-26 | End: 2022-05-27

## 2022-05-25 RX ORDER — ACETAMINOPHEN 325 MG/1
650 TABLET ORAL
Status: DISCONTINUED | OUTPATIENT
Start: 2022-05-25 | End: 2022-05-27 | Stop reason: HOSPADM

## 2022-05-25 RX ORDER — ASPIRIN 325 MG
325 TABLET ORAL DAILY
Status: DISCONTINUED | OUTPATIENT
Start: 2022-05-25 | End: 2022-05-26

## 2022-05-25 RX ORDER — CHOLECALCIFEROL (VITAMIN D3) 125 MCG
5 CAPSULE ORAL
Status: DISCONTINUED | OUTPATIENT
Start: 2022-05-25 | End: 2022-05-27 | Stop reason: HOSPADM

## 2022-05-25 RX ORDER — LEVOTHYROXINE SODIUM 125 UG/1
125 TABLET ORAL
Status: DISCONTINUED | OUTPATIENT
Start: 2022-05-26 | End: 2022-05-27 | Stop reason: HOSPADM

## 2022-05-25 RX ORDER — PANTOPRAZOLE SODIUM 40 MG/1
40 TABLET, DELAYED RELEASE ORAL DAILY
Status: DISCONTINUED | OUTPATIENT
Start: 2022-05-26 | End: 2022-05-27 | Stop reason: HOSPADM

## 2022-05-25 RX ORDER — LATANOPROST 50 UG/ML
1 SOLUTION/ DROPS OPHTHALMIC
Status: DISCONTINUED | OUTPATIENT
Start: 2022-05-25 | End: 2022-05-27 | Stop reason: HOSPADM

## 2022-05-25 RX ORDER — DULOXETIN HYDROCHLORIDE 20 MG/1
20 CAPSULE, DELAYED RELEASE ORAL DAILY
Status: DISCONTINUED | OUTPATIENT
Start: 2022-05-26 | End: 2022-05-27 | Stop reason: HOSPADM

## 2022-05-25 RX ORDER — AMIODARONE HYDROCHLORIDE 200 MG/1
200 TABLET ORAL DAILY
Status: DISCONTINUED | OUTPATIENT
Start: 2022-05-26 | End: 2022-05-27 | Stop reason: HOSPADM

## 2022-05-25 RX ORDER — MECLIZINE HCL 12.5 MG 12.5 MG/1
25 TABLET ORAL
Status: DISCONTINUED | OUTPATIENT
Start: 2022-05-25 | End: 2022-05-27 | Stop reason: HOSPADM

## 2022-05-25 RX ORDER — SODIUM CHLORIDE 9 MG/ML
250 INJECTION, SOLUTION INTRAVENOUS
Status: DISCONTINUED | OUTPATIENT
Start: 2022-05-25 | End: 2022-05-27 | Stop reason: HOSPADM

## 2022-05-25 RX ORDER — LIDOCAINE HYDROCHLORIDE 10 MG/ML
INJECTION, SOLUTION EPIDURAL; INFILTRATION; INTRACAUDAL; PERINEURAL AS NEEDED
Status: DISCONTINUED | OUTPATIENT
Start: 2022-05-25 | End: 2022-05-25 | Stop reason: HOSPADM

## 2022-05-25 RX ORDER — FENTANYL CITRATE 50 UG/ML
INJECTION, SOLUTION INTRAMUSCULAR; INTRAVENOUS AS NEEDED
Status: DISCONTINUED | OUTPATIENT
Start: 2022-05-25 | End: 2022-05-25 | Stop reason: HOSPADM

## 2022-05-25 RX ADMIN — ACETAMINOPHEN 650 MG: 325 TABLET ORAL at 18:25

## 2022-05-25 RX ADMIN — SODIUM CHLORIDE, PRESERVATIVE FREE 10 ML: 5 INJECTION INTRAVENOUS at 21:42

## 2022-05-25 RX ADMIN — GABAPENTIN 100 MG: 100 CAPSULE ORAL at 21:41

## 2022-05-25 RX ADMIN — ALLOPURINOL 100 MG: 100 TABLET ORAL at 21:41

## 2022-05-25 RX ADMIN — SODIUM CHLORIDE, PRESERVATIVE FREE 10 ML: 5 INJECTION INTRAVENOUS at 21:41

## 2022-05-25 RX ADMIN — Medication 5 MG: at 21:41

## 2022-05-25 RX ADMIN — ASPIRIN 325 MG ORAL TABLET 325 MG: 325 PILL ORAL at 10:49

## 2022-05-25 NOTE — Clinical Note
Sheath #1: sheath exchanged for INTRO [facility] W/GDWIRE [id number] -- BRITE TIP - ORDER AS EA.

## 2022-05-25 NOTE — ED TRIAGE NOTES
Pt endorses SOB and weakness, started over the weekend. Pt is a dialysis patient, last treatment was on Monday, she is due for it today.  Pt also complains of tightness in her chest.

## 2022-05-25 NOTE — DIALYSIS
ACUTE HEMODIALYSIS FLOW SHEET    HEMODIALYSIS ORDERS: Physician: Dr. Yamila Hickey: [place]   Duration: 3.5 hr   BFR: 350   DFR: 600   Dialysate:  Temp 36-37*C   K+  2    Ca+ 2.5   Na 138   Bicarb 35   Wt Readings from Last 1 Encounters:   05/25/22 90.7 kg (200 lb)    Patient Chart [x]   Unable to Obtain []  Dry weight/UF Goal: 2000 ml    Heparin []  Bolus    Units    [] Hourly    Units    [x]None       Pre BP: 124/40  Pulse: 55  Respirations: 16 Temp: 97.0  [] Oral  [x] Ax  [] Esoph   Labs: []  Pre  []  Post:   [x] N/A   Additional Orders (medications, blood products, hypotension management): [] Yes   [x] No     [x]  [facility] Consent Verified     CATHETER ACCESS:  [x]N/A   []Right   []Left   []IJ   []Fem  []Chest wall  []TransHepatic                    GRAFT/FISTULA ACCESS:   []N/A     []Right     [x]Left     [x]UE     []LE   []AVG   [x]AVF       [x]Medical Aseptic Prep Utilized   [x]No S/S infection  []Redness  []Drainage [] Cultured  [] Swelling  [] Pain  Bruit:   [x] Strong    [] Weak       Thrill :   [x] Strong    [] Weak     Needle Gauge: 15   Length: 1 inch   If access problem,  notified: []Yes     [x]N/A          GENERAL ASSESSMENT:    LUNGS:  Resp Rate 16   [] Clear  [] Coarse  [] Crackles  [] Wheezing  [x] Diminished                                                           [x] RLL   [x] LLL  [] RUL   [] LALI            Respirations:  [x]Easy  []Labored  []N/A  Cough:  []Productive  []Dry  []N/A               Therapy:  [x]RA   [] Ventilated   [] Intubated   [] Trach            O2 Device:  [] NC   [] NRB  [] Trach Mask  [] BiPaP  Flow:   l/min                                                    CARDIAC: [x] Regular      [] Irregular   [] Rhythm:not monitored          [] Monitored   [] Bedside   [] Remotely monitored       EDEMA: [] None   []Generalized  [] Pitting [] 1+   [] 2 +   [] 3+    [] 4+        SKIN:   [] Hot     [] Cold    [x] Warm   [x] Dry    [] Diaphoretic                 [] Flushed  [] Jaundiced  [] Cyanotic  [] Pale      LOC:    [x] Alert      [x]Oriented:    [x] Person     [x] Place   [x]Time               [] Confused  [] Lethargic  [] Medicated  [] Non-responsive  [] Non-Verbal     GI / ABDOMEN:                     [] Flat    [] Distended    [x] Soft    [] Firm   []  Obese                   [] Diarrhea   [] FMS [x] Bowel Sounds  [] Nausea  [] Vomiting                   [] NGT  [] OGT  [] PEG  [] Tube Feedings @     mL/hr     / URINE ASSESSMENT:                   [] Voiding    [x] Oliguria  [] Anuria                     []  Cline   [] Incontinent  []  Incontinent Brief   []  PureWick     PAIN:  [x] 0 []1  []2   []3   []4   []5   []6   []7   []8   []9   []10                MOBILITY:  [x] Bed    [] Stretcher      All Vitals and Treatment Details on Attached [email or web address]: [facility]          Room # CCL/PL    [x] Routine         [] 1st Time Acute/Chronic   [] Urgent      [] Stat            [x] Acute Room   []  Bedside    [] ICU/CCU     [] ER     Isolation Precautions:  [x] Dialysis    Contact     ALLERGIES:     Allergies   Allergen Reactions    Albumin, Human 25 % Itching     Headache - severe migraine like, itchy eyes, runny nose    Ciprofloxacin Hives    Cyclopentolate Unknown (comments)    Iron Sucrose Diarrhea    Statins-Hmg-Coa Reductase Inhibitors Other (comments)     Body ache        Code Status:  Prior     Hepatitis Status      Lab Results   Component Value Date/Time    Hepatitis B surface Ag <0.10 11/17/2021 02:20 PM    Hepatitis B surface Ab 29.33 11/17/2021 02:20 PM        Current Labs:      Lab Results   Component Value Date/Time    WBC 5.0 05/25/2022 09:12 AM    Hemoglobin, POC 8.8 (L) 09/24/2020 08:45 AM    HGB 9.1 (L) 05/25/2022 09:12 AM    Hematocrit, POC 26 (L) 09/24/2020 08:45 AM    HCT 28.8 (L) 05/25/2022 09:12 AM    PLATELET 970 07/14/4503 09:12 AM    .6 (H) 05/25/2022 09:12 AM     Lab Results   Component Value Date/Time    Sodium 141 05/25/2022 09:12 AM    Potassium 4.6 05/25/2022 09:12 AM    Chloride 102 05/25/2022 09:12 AM    CO2 30 05/25/2022 09:12 AM    Anion gap 9 05/25/2022 09:12 AM    Glucose 100 (H) 05/25/2022 09:12 AM    BUN 31 (H) 05/25/2022 09:12 AM    Creatinine 6.72 (H) 05/25/2022 09:12 AM    BUN/Creatinine ratio 5 (L) 05/25/2022 09:12 AM    GFR est AA 7 (L) 05/25/2022 09:12 AM    GFR est non-AA 6 (L) 05/25/2022 09:12 AM    Calcium 9.1 05/25/2022 09:12 AM          DIET:  DIET ADULT     PRIMARY NURSE REPORT:   Pre Dialysis: MEGHANN Smith RN    Time: 65      EDUCATION:    [x] Patient           Knowledge Basis: []None [x]Minimal [] Substantial [] Unknown  Barriers to learning  [x]None  [] Intubated/Trached/Ventilated  [] Sedated/Paralyzed   [] Access Care     [] S&S of infection  [] Fluid Management  [] K+   [x] Procedural    [] Medications   [] Tx Options   [] Transplant   [] Diet      Teaching Tools:  [x] Explain  [] Demo  [] Handouts [] Video  Patient response: [x] Verbalized understanding   [] Requires follow up        [x] Time Out/Safety Check    [x] Extracorporeal Circuit Tested for integrity       RO/HEMODIALYSIS MACHINE SAFETY CHECKS - Before each treatment:        [address] [place] [address]                                     [x] Unit Machine # 9 with centralized RO                                                                                                                                     Alarm Test:  Pass time 1500            [x] RO/Machine Log Complete    Machine Temp    36-37*C             Dialysate: pH  7.4    Conductivity: Meter 14.0    HD Machine  14.1     TCD: 13.8  Dialyzer Lot # [id number]     Blood Tubing Lot # [id number]     Saline Lot # [id number]     CHLORINE TESTING-Before each treatment and every 4 hours    Total Chlorine: [x] less than 0.1 ppm  Initial Time Check: 1200       4 Hr/2nd Check Time: 1600   (if greater than 0.1 ppm from Primary then every 30 minutes from Secondary)     TREATMENT INITIATION - with Dialysis Precautions: [x] All Connections Secured              [x] Saline Line Double Clamped   [x] Venous Parameters Set               [x] Arterial Parameters Set    [x] Prime Given 250ml NSS              [x]Air Foam Detector Engaged        Treatment Initiation Note:  1530--Pt arrived for dialysis. Pt assessesd and is stable for dialysis(see above note). 1545--HD initiated without difficulty. During Treatment Notes:  1600  Face & Vascular access visible with art and nichelle line connections intact. Pt tolerating dialysis. 1615  Face & Vascular access visible with art and nichelle line connections intact. Pt tolerating dialysis. 1630  Face & Vascular access visible with art and nichelle line connections intact. BP low, new orders received to decrease UF goal to 1L.  1645  Face & Vascular access visible with art and nichelle line connections intact. Pt tolerating dialysis. 1700  Face & Vascular access visible with art and nichelle line connections intact. Pt tolerating dialysis. 1715  Face & Vascular access visible with art and nichelle line connections intact. Pt tolerating dialysis. 1825--Tylenol given for back pain. Pt rated pain 5/10.  1730  Face & Vascular access visible with art and nichelle line connections intact. Pt tolerating dialysis. 1745  Face & Vascular access visible with art and nichelle line connections intact. Pt tolerating dialysis. 1800  Face & Vascular access visible with art and nichelle line connections intact. Pt tolerating dialysis. 1815  Face & Vascular access visible with art and nichelle line connections intact. Pt tolerating dialysis. 1830  Face & Vascular access visible with art and nichelle line connections intact. Pt tolerating dialysis. 1845  Face & Vascular access visible with art and nichelle line connections intact. Pt tolerating dialysis. 1900  Face & Vascular access visible with art and nichelle line connections intact. Pt tolerating dialysis. 1920  Dialysis treatment complete.        Medication    Dose    Volume Route      Time       Dat Nurse   tylenol 650mg 2tab HD [address] Moore, JOSE ANTONIO Moore, JOSE ANTONIO Moore RN     Post Assessment  Dialyzer Cleared:   [] Good  [x] Fair  [] Poor  Blood processed:  69.5 L  UF Removed:  1000 Ml    Post BP: 146/55  Pulse: 74  Respirations: 16   Temp: 97.0  [] Oral  [] Ax  [] Esophageal   Lungs: [] Clear                [x] No change from initial assessment   Post Tx Vascular Access: [] N/A  AVF/AVG: Bleeding stopped with  Arterial Pressure for 10 min   Venous Pressure for 10 min      Cardiac:  [x] Regular   [] Irregular   Rhythm:  [] Monitored   [x] Not Monitored    CVC Catheter: [x] N/A   Edema:  [] None  [x] Generalized                     Skin:[x] Warm  [x] Dry [] Diaphoretic               [] Flushed  [] Pale [] Cyanotic Pain:  [x]0  []1-2  []3-4  []5-6   []7-8  []9-10         Post Treatment Note:   Pt tolerated dialysis well. Arterial and venous needles removed and pressure applied until bleeding stopped. Dry dressings applied. Bruit/Thrill present above and below dressings. POST TREATMENT PRIMARY NURSE HANDOFF REPORT:   Post Dialysis: JORGE Titus RN        Time:  1945       Abbreviations: AVG-arterial venous graft, AVF-arterial venous fistula, IJ-Internal Jugular, Subcl-Subclavian, Fem-Femoral, Tx-treatment, AP/HR-apical heart rate, VSS- Vital Signs Stable, CVC- Central Venous Catheter, DFR-dialysate flow rate, BFR-blood flow rate, AP-arterial pressure, -venous pressure, UF-ultrafiltrate, TMP-transmembrane pressure, Jasper-Venous, Art-Arterial, RO-Reverse Osmosis

## 2022-05-25 NOTE — ED NOTES
Report given to Cath Lab nurse. Patient is alert and oriented at time of transport. Patient's walker and clothing accompanied her to cath lab.

## 2022-05-25 NOTE — CONSULTS
Cardiology Initial Patient Referral Note    Cardiology referral request from   for evaluation and management/treatment of CP     Date of  Admission: 5/25/2022  8:08 AM   Primary Care Physician:  Martin Singh MD    Attending Cardiologist: Jesenia Montgomery      Assessment:     -Chest pain, concerning for accelerating angina. No acute ST changes on initial ECG. Initial labs pending. · ECHO (04/22) with preserved LV function. No significant valvular pathology. · Low risk nuclear stress test (11/20)   -Paroxysmal Afib, currently in SR. Anticoagulated with Eliquis as outpatient, with plans to consider watchman in the future. · Last dose taken 5/24 am.   -HLD, not on statin d/t intolerance. -DM  -ESRD on HD, M/W/F  -Anemia of chronic disease.   -Chronic hypotension, on Midodrine.   -Hypothyroidism on synthroid      Primary cardiologist Dr. Darrion Lomas.      Plan:     -Currently stable w/o chest pain at this time. Will keep NPO for left heart and tentative right heart catheterization this morning.   -Continue to hold Eliquis, will tentatively plan to resume post LHC. Last dose taken 5/24 am.   -Will give 325 mg ASA now. Noted previous intolerance of body aches to statins.   -Will resume outpatient amiodarone. -D/w Dr. Ana Marie with nephrology, will arrange for HD post [facility]. -Further recommendations pending heart cath results/hospital course. History of Present Illness: This is a 64 y.o. female admitted for No admission diagnoses are documented for this encounter. .    Patient complains of: CP, SOB   Mayito Stern is a 64 y.o. female, pmhx as stated above, who we are seeing for CP. Patient reports worsening chest discomfort and SOB with minimal activity. This have been progressively worsening over the last 6 months, now occurring with less activity, like walking around her house. She describes mid chest discomfort, described as tightness.  The pain does not radiate but self resolves with resting for a few minutes - half an hour.  Last episode occurring prior to presenting to the hospital. Currently she is without CP while at rest. Denies N/V/D, palpitations, near syncope or syncope, orthopnea, PND, LE edema. Cardiac risk factors: dyslipidemia, diabetes mellitus, hypertension, post-menopausal    Review of Symptoms:  Except as stated above include:  Constitutional:  As per HPI   Respiratory:  As per HPI   Cardiovascular:  As per HPI   Gastrointestinal: negative  Genitourinary:  negative  Musculoskeletal:  Negative  Neurological:  Negative  Dermatological:  Negative  Endocrinological: Negative  Psychological:  Negative    A comprehensive review of systems was negative except for that written in the HPI. Past Medical History:     Past Medical History:   Diagnosis Date    Anemia in end-stage renal disease ([facility] [address]     Anticoagulated by anticoagulation treatment     On Apixaban    Chronic hypotension     On Midodrine    Chronic pain     Closed fracture of left inferior pubic ramus, with routine healing, subsequent encounter 11/12/2021    Closed fracture of superior pubic ramus, left, with routine healing, subsequent encounter 11/12/2021    Closed nondisplaced fracture of anterior wall of left acetabulum with routine healing 11/12/2021    Depression     End-stage renal disease on hemodialysis ([facility] [address]     HD at [facility] on [facility].  Tel # [phone]    Gastroesophageal reflux disease     Glaucoma     History of acute pyelonephritis 2/20/2020    History of hydronephrosis 10/5/2021    History of infection with vancomycin resistant Enterococcus (VRE) 10/8/2021    Urine culture (collected 10/8/2021, resulted 10/14/2021) yielded growth of >100,000 colonies/ml of Enterococcus faecalis RESISTANT to Ciprofloxacin, Levofloxacin, Tetracycline and Vancomycin    History of kidney stones     History of recurrent urinary tract infection     History of sepsis 6/18/2021    History of septic shock 10/8/2021    History of urethral stricture     History of urinary tract infection 10/8/2021    Urine culture (collected 10/8/2021, resulted 10/14/2021) yielded growth of >100,000 colonies/ml of Enterococcus faecalis RESISTANT to Ciprofloxacin, Levofloxacin, Tetracycline and Vancomycin    Hyperlipidemia     Hyperphosphatemia 11/14/2021    Hypothyroidism     Lung mass     Mononeuropathy     Involving ring finger of left hand    Need for prophylactic isolation 10/8/2021    Urine culture (collected 10/8/2021, resulted 10/14/2021) yielded growth of >100,000 colonies/ml of Enterococcus faecalis RESISTANT to Ciprofloxacin, Levofloxacin, Tetracycline and Vancomycin    Paroxysmal atrial fibrillation (HCC)     Secondary hyperparathyroidism of renal origin ([facility] [address]     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 ([facility] [address]     HbA1c (10/8/2021) = 4.6    Uric acid nephrolithiasis     Urinary incontinence          Social History:     Social History     Socioeconomic History    Marital status: SINGLE   Tobacco Use    Smoking status: Never Smoker    Smokeless tobacco: Never Used   Vaping Use    Vaping Use: Never used   Substance and Sexual Activity    Alcohol use: Never    Drug use: Never        Family History:     Family History   Problem Relation Age of Onset    Heart Surgery Sister         Medications: Allergies   Allergen Reactions    Albumin, Human 25 % Itching     Headache - severe migraine like, itchy eyes, runny nose    Ciprofloxacin Hives    Cyclopentolate Unknown (comments)    Iron Sucrose Diarrhea    Statins-Hmg-Coa Reductase Inhibitors Other (comments)     Body ache        No current facility-administered medications for this encounter. Current Outpatient Medications   Medication Sig    0.9% sodium chloride solp 100 mL with iron sucrose 20 MG/ML 50 mg. (Patient not taking: Reported on 4/12/2022)    doxercalciferol (HECTOROL IV) 5 mcg.  fludrocortisone (FLORINEF) 0.1 mg tablet Take 1 Tablet by mouth.  (Patient not taking: Reported on 4/12/2022)    midodrine (PROAMATINE) 10 mg tablet Take 10 mg by mouth three (3) times daily.  ondansetron hcl (ZOFRAN) 4 mg tablet     melatonin 5 mg tablet Take 5 mg by mouth nightly.  HYDROmorphone (DILAUDID) 2 mg tablet Take 1 mg by mouth every eight (8) hours as needed for Pain. Take 1/2 tablet by mouth every 8 hours as needed for pain level of 5 out of 10 or greater    allopurinoL (ZYLOPRIM) 100 mg tablet Take 1 Tablet by mouth every Monday, Wednesday, Friday. [after hemodialysis]  Indications: treatment to prevent acute gout attack    amiodarone (CORDARONE) 200 mg tablet Take 1 Tablet by mouth daily. Indications: prevention of recurrent atrial fibrillation    sevelamer carbonate (RENVELA) 800 mg tab tab Take 2 Tablets by mouth three (3) times daily (with meals). Indications: renal osteodystrophy with hyperphosphatemia    sodium zirconium cyclosilicate (LOKELMA) 5 gram powder packet Take 1 Packet by mouth daily. Indications: high levels of potassium in the blood    acetaminophen (Tylenol Extra Strength) 500 mg tablet Take 1 Tablet by mouth every six (6) hours as needed for Pain.  lidocaine (LIDODERM) 5 % Apply patch to the affected area for 12 hours a day and remove for 12 hours a day.  gabapentin (NEURONTIN) 100 mg capsule Take 2 Capsules by mouth Three (3) times a week. Max Daily Amount: 200 mg. Take 2 capsules by mouth 3 times a week as needed for pain post dialysis. Indications: concern for back pain post dialysis (Patient taking differently: Take 100 mg by mouth daily. taking 1 pill at night before bed  Indications: concern for back pain post dialysis)    apixaban (ELIQUIS) 5 mg tablet Take 1 Tablet by mouth two (2) times a day.  meclizine (ANTIVERT) 25 mg tablet Take 25 mg by mouth three (3) times daily as needed for Dizziness.  methoxy peg-epoetin beta (MIRCERA INJECTION) 30 mcg.  DULoxetine (Cymbalta) 20 mg capsule Take 20 mg by mouth daily.     estradioL (Estrace) 0.01 % (0.1 mg/gram) vaginal cream Apply a fingertip amount around the urethra three times a week.  biotin 1,000 mcg chew Take 1 Tab by mouth daily.  cyanocobalamin 1,000 mcg tablet Take 1,000 mcg by mouth daily.  lactobacillus sp. 50 billion cpu (BIO-K PLUS) 50 billion cell -375 mg cap capsule Take 1 Cap by mouth daily. (Patient not taking: Reported on 4/12/2022)    ascorbic acid, vitamin C, (VITAMIN C) 500 mg tablet Take 500 mg by mouth daily.  calcitRIOL (ROCALTROL) 0.25 mcg capsule Take 0.25 mcg by mouth daily.  cholecalciferol (VITAMIN D3) (2,000 UNITS /50 MCG) cap capsule Take 2,000 Units by mouth two (2) times a day. Take two tabs a total of 4000 units    latanoprost (XALATAN) 0.005 % ophthalmic solution Administer 1 Drop to both eyes nightly. One drop at bedtime    levothyroxine (SYNTHROID) 125 mcg tablet Take 125 mcg by mouth Daily (before breakfast).  omeprazole (PRILOSEC) 20 mg capsule Take 20 mg by mouth daily.  ondansetron (ZOFRAN ODT) 4 mg disintegrating tablet Take 4 mg by mouth every eight (8) hours as needed for Nausea or Vomiting.  vit B Cmplx 3-FA-Vit C-Biotin (NEPHRO HOWIE RX) 1- mg-mg-mcg tablet Take 1 Tab by mouth daily.          Physical Exam:     Visit Vitals  BP (!) 146/51 (BP 1 Location: Left upper arm, BP Patient Position: At rest)   Pulse 72   Temp 98.1 °F (36.7 °C)   Resp 18   Ht 5' 2\" (1.575 m)   Wt 91 kg (200 lb 9.9 oz)   SpO2 100%   BMI 36.69 kg/m²       TELE: not on tele    BP Readings from Last 3 Encounters:   05/25/22 (!) 146/51   05/05/22 (!) 100/49   05/02/22 (!) 108/53     Pulse Readings from Last 3 Encounters:   05/25/22 72   05/05/22 65   05/02/22 85     Wt Readings from Last 3 Encounters:   05/25/22 91 kg (200 lb 9.9 oz)   05/05/22 93.9 kg (207 lb)   05/02/22 94 kg (207 lb 3.7 oz)       General:  alert, cooperative, no distress, appears stated age  Neck:  no JVD  Lungs:  clear to auscultation bilaterally  Heart:  regular rate and rhythm, S1, S2 normal, no murmur, click, rub or gallop  Abdomen:  abdomen is soft without significant tenderness, masses, organomegaly or guarding  Extremities:  extremities normal, atraumatic, no cyanosis or edema  Skin: Warm and dry. no hyperpigmentation, vitiligo, or suspicious lesions  Neuro: alert, oriented x3, affect appropriate  Psych: non focal     Data Review:     No results for input(s): WBC, HGB, HCT, PLT, HGBEXT, HCTEXT, PLTEXT in the last 72 hours. No results for input(s): NA, K, CL, CO2, GLU, BUN, CREA, CA, MG, PHOS, ALB, TBIL, ALT, INR, INREXT in the last 72 hours.     No lab exists for component: SGOT,  BNP    Results for orders placed or performed during the hospital encounter of 05/25/22   EKG, 12 LEAD, INITIAL   Result Value Ref Range    Ventricular Rate 66 BPM    Atrial Rate 71 BPM    QRS Duration 96 ms    Q-T Interval 428 ms    QTC Calculation (Bezet) 448 ms    Calculated R Axis -26 degrees    Calculated T Axis 31 degrees    Diagnosis       Sinus rhythm with low amplitude p-waves  Left axis deviation      Confirmed by Khalif Archer MD, Lela Form ([id number]) on 5/25/2022 8:12:03 AM         All Cardiac Markers in the last 24 hours:  No results found for: CPK, CK, CKMMB, CKMB, RCK3, CKMBT, CKNDX, CKND1, PATTI, TROPT, TROIQ, GRAHAM, TROPT, TNIPOC, BNP, BNPP    Last Lipid:  No results found for: CHOL, CHOLX, CHLST, CHOLV, HDL, HDLP, LDL, LDLC, DLDLP, TGLX, TRIGL, TRIGP, CHHD, CHHDX    Cardiographics:     EKG Results     Procedure 720 Value Units Date/Time    EKG, 12 LEAD, INITIAL [[id number]] Collected: 05/25/22 0804    Order Status: Completed Updated: 05/25/22 0812     Ventricular Rate 66 BPM      Atrial Rate 71 BPM      QRS Duration 96 ms      Q-T Interval 428 ms      QTC Calculation (Bezet) 448 ms      Calculated R Axis -26 degrees      Calculated T Axis 31 degrees      Diagnosis --     Sinus rhythm with low amplitude p-waves  Left axis deviation      Confirmed by Khalif Archer MD, Donnise Form ([id number]) on 5/25/2022 8:12:03 AM            11/11/20    NUCLEAR CARDIAC STRESS TEST 11/11/2020 11/11/2020    Interpretation Summary  · Baseline ECG: Normal sinus rhythm. · Negative stress test.  · Gated SPECT: Left ventricular function post-stress was normal. Calculated ejection fraction is 67%. There is no evidence of transient ischemic dilation (TID). The TID ratio is 0.91.  · Myocardial perfusion imaging supports a low risk stress test.  · Left ventricular perfusion is normal.    Signed by: Aj Velez MD on 11/11/2020 12:47 PM      XR Results (most recent):  Results from Hospital Encounter encounter on 04/12/22    XR RETROGRADE PYELOGRAM    Narrative  XR RETROGRADE PYELOGRAM: 4/12/2022 2:23 PM    CLINICAL INFORMATION  right retrograde pyelogram.    COMPARISON  None. TECHNIQUE  Total fluoroscopic images: 12  Total fluoroscopy time: 10 seconds    FINDINGS:  Fluoroscopic assistance during right retrograde pyelogram with stent placement. Moderate hydronephrosis. Please refer to procedural report for full description  of findings. Impression  As above.         Signed By: Lucie Palma PA-C     May 25, 2022

## 2022-05-25 NOTE — PROGRESS NOTES
TRANSFER - IN REPORT:    Verbal report received from JT (name) on Gutierrez Lauraside  being received from SD(unit) for routine progression of care      Report consisted of patients Situation, Background, Assessment and   Recommendations(SBAR). Information from the following report(s) SBAR, ED Summary, OR Summary and Cardiac Rhythm pain was reviewed with the receiving nurse. Opportunity for questions and clarification was provided. Assessment completed upon patients arrival to unit and care assumed.

## 2022-05-25 NOTE — ED PROVIDER NOTES
EMERGENCY DEPARTMENT HISTORY AND PHYSICAL EXAM    8:24 AM    Date: 5/25/2022  Patient Name: Dede Erickson    History of Presenting Illness     Chief Complaint   Patient presents with    Shortness of Breath    Fatigue       History Provided By: Patient and Patient's Son    HPI  Patient is a 64year old female with PMH chronic pain, end-stage renal disease on dialysis (Monday Wednesday Friday), hyperlipidemia, thyroid disease, neuropathy, depression, the presents emergency department with \"shortness of breath and chest pain\". Patient states she saw Dr. Trevizo/cardiology this last week who recommended that they proceed with a heart cath outpatient and attempt for a watchman for her atrial fibrillation. Patient states that she has been off of her Eliquis since yesterday. She notes that her chest tightness and shortness of breath has been going on for approximately a year but has progressively worsened. She states that the symptoms were very prominent on Saturday. She did talk with her cardiologist on the phone and they recommended that she come into the ED if they got any worse or if they lingered. She notes that the chest tightness and shortness of breath last for approximately 20 to 30 minutes while she is exerting herself and resolves when she rests. She also notes some diaphoresis with these symptoms. She does not take any nitro. Denies any peripheral edema, fevers, chills. Nephrologist is Dr. Ana Marie.      PCP: Martin Singh MD    Current Facility-Administered Medications   Medication Dose Route Frequency Provider Last Rate Last Admin    aspirin tablet 325 mg  325 mg Oral DAILY Marquez AVENDANO PA-C        [START ON 5/26/2022] amiodarone (CORDARONE) tablet 200 mg  200 mg Oral DAILY Marquez AVENDANO PA-C        0.9% sodium chloride infusion 250 mL  250 mL IntraVENous DIALYSIS JIGNA Hendrix MD           Past History     Past Medical History:  Past Medical History:   Diagnosis Date    Anemia in end-stage renal disease ([facility] [address]     Anticoagulated by anticoagulation treatment     On Apixaban    Chronic hypotension     On Midodrine    Chronic pain     Closed fracture of left inferior pubic ramus, with routine healing, subsequent encounter 11/12/2021    Closed fracture of superior pubic ramus, left, with routine healing, subsequent encounter 11/12/2021    Closed nondisplaced fracture of anterior wall of left acetabulum with routine healing 11/12/2021    Depression     End-stage renal disease on hemodialysis ([facility] [address]     HD at [facility].  Tel # [phone]    Gastroesophageal reflux disease     Glaucoma     History of acute pyelonephritis 2/20/2020    History of hydronephrosis 10/5/2021    History of infection with vancomycin resistant Enterococcus (VRE) 10/8/2021    Urine culture (collected 10/8/2021, resulted 10/14/2021) yielded growth of >100,000 colonies/ml of Enterococcus faecalis RESISTANT to Ciprofloxacin, Levofloxacin, Tetracycline and Vancomycin    History of kidney stones     History of recurrent urinary tract infection     History of sepsis 6/18/2021    History of septic shock 10/8/2021    History of urethral stricture     History of urinary tract infection 10/8/2021    Urine culture (collected 10/8/2021, resulted 10/14/2021) yielded growth of >100,000 colonies/ml of Enterococcus faecalis RESISTANT to Ciprofloxacin, Levofloxacin, Tetracycline and Vancomycin    Hyperlipidemia     Hyperphosphatemia 11/14/2021    Hypothyroidism     Lung mass     Mononeuropathy     Involving ring finger of left hand    Need for prophylactic isolation 10/8/2021    Urine culture (collected 10/8/2021, resulted 10/14/2021) yielded growth of >100,000 colonies/ml of Enterococcus faecalis RESISTANT to Ciprofloxacin, Levofloxacin, Tetracycline and Vancomycin    Paroxysmal atrial fibrillation (HCC)     Secondary hyperparathyroidism of renal origin ([facility] [address]     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 ([facility] [address]     HbA1c (10/8/2021) = 4.6    Uric acid nephrolithiasis     Urinary incontinence        Past Surgical History:  Past Surgical History:   Procedure Laterality Date    HX APPENDECTOMY      HX CHOLECYSTECTOMY      HX GASTRIC BYPASS      Gastric stapling    HX KNEE ARTHROSCOPY      HX UROLOGICAL      right PCN placement    HX UROLOGICAL  07/23/2018    RIGHT URETEROSCOPY WITH HOLMIUM LASER    IR EXCHANGE NEPHRO PERC LT SI  2/21/2020    IR EXCHANGE NEPHRO PERC RT SI  4/13/2020    IR EXCHANGE NEPHRO PERC RT SI  7/17/2020    IR NEPHROSTOMY PERC RT PLC CATH  SI  10/14/2020    IR NEPHROURETERAL PERC RT PLC CATH NEW ACCESS  SI  4/30/2020    IA INTRO CATH DIALYSIS CIRCUIT DX ANGRPH FLUOR S&I Left 9/24/2020    FISTULOGRAM LEFT/poss permanent catheter placement performed by Hannah Stern [facility] at [facility] CATH [facility]    VASCULAR SURGERY PROCEDURE UNLIST      lef AVF       Family History:  Family History   Problem Relation Age of Onset    Heart Surgery Sister        Social History:  Social History     Tobacco Use    Smoking status: Never Smoker    Smokeless tobacco: Never Used   Vaping Use    Vaping Use: Never used   Substance Use Topics    Alcohol use: Never    Drug use: Never       Allergies: Allergies   Allergen Reactions    Albumin, Human 25 % Itching     Headache - severe migraine like, itchy eyes, runny nose    Ciprofloxacin Hives    Cyclopentolate Unknown (comments)    Iron Sucrose Diarrhea    Statins-Hmg-Coa Reductase Inhibitors Other (comments)     Body ache       Review of Systems     Review of Systems   Constitutional: Positive for fatigue. Negative for chills and fever. Respiratory: Positive for chest tightness and shortness of breath. Negative for cough. Gastrointestinal: Negative for nausea and vomiting. Skin: Negative for color change and rash. Neurological: Negative for dizziness and light-headedness.      Physical Exam     Visit Vitals  BP (!) 146/51 (BP 1 Location: Left upper arm, BP Patient Position: At rest)   Pulse 72   Temp 98.1 °F (36.7 °C)   Resp 18   Ht 5' 2\" (1.575 m)   Wt 91 kg (200 lb 9.9 oz)   SpO2 100%   BMI 36.69 kg/m²     Physical Exam  Vitals and nursing note reviewed. Constitutional:       General: She is not in acute distress. Appearance: Normal appearance. She is not ill-appearing or toxic-appearing. HENT:      Head: Normocephalic and atraumatic. Right Ear: External ear normal.      Left Ear: External ear normal.      Nose: Nose normal.   Eyes:      Conjunctiva/sclera: Conjunctivae normal.   Cardiovascular:      Rate and Rhythm: Normal rate and regular rhythm. Pulses: Normal pulses. Pulmonary:      Effort: Pulmonary effort is normal. No respiratory distress. Breath sounds: Normal breath sounds. Abdominal:      General: Abdomen is flat. Palpations: Abdomen is soft. Musculoskeletal:         General: Normal range of motion. Cervical back: Normal range of motion. Right lower leg: No edema. Left lower leg: No edema. Skin:     General: Skin is warm. Capillary Refill: Capillary refill takes less than 2 seconds. Findings: Ecchymosis present. No erythema. Neurological:      General: No focal deficit present. Mental Status: She is alert.    Psychiatric:         Mood and Affect: Mood normal.         Behavior: Behavior normal.       Diagnostic Study Results     Labs -  Recent Results (from the past 12 hour(s))   EKG, 12 LEAD, INITIAL    Collection Time: 05/25/22  8:04 AM   Result Value Ref Range    Ventricular Rate 66 BPM    Atrial Rate 71 BPM    QRS Duration 96 ms    Q-T Interval 428 ms    QTC Calculation (Bezet) 448 ms    Calculated R Axis -26 degrees    Calculated T Axis 31 degrees    Diagnosis       Sinus rhythm with low amplitude p-waves  Left axis deviation      Confirmed by Konrad Schmidt MD, Julio Forbes ([id number]) on 5/25/2022 8:12:03 AM     CBC WITH AUTOMATED DIFF    Collection Time: 05/25/22  9:12 AM   Result Value Ref Range    WBC 5.0 4.6 - 13.2 K/uL    RBC 2.78 (L) 4.20 - 5.30 M/uL    HGB 9.1 (L) 12.0 - 16.0 g/dL    HCT 28.8 (L) 35.0 - 45.0 %    .6 (H) 78.0 - 100.0 FL    MCH 32.7 24.0 - 34.0 PG    MCHC 31.6 31.0 - 37.0 g/dL    RDW 14.6 (H) 11.6 - 14.5 %    PLATELET 325 452 - 625 K/uL    MPV 10.0 9.2 - 11.8 FL    NRBC 0.0 0  WBC    ABSOLUTE NRBC 0.00 0.00 - 0.01 K/uL    NEUTROPHILS 61 40 - 73 %    LYMPHOCYTES 23 21 - 52 %    MONOCYTES 12 (H) 3 - 10 %    EOSINOPHILS 2 0 - 5 %    BASOPHILS 1 0 - 2 %    IMMATURE GRANULOCYTES 1 (H) 0.0 - 0.5 %    ABS. NEUTROPHILS 3.1 1.8 - 8.0 K/UL    ABS. LYMPHOCYTES 1.1 0.9 - 3.6 K/UL    ABS. MONOCYTES 0.6 0.05 - 1.2 K/UL    ABS. EOSINOPHILS 0.1 0.0 - 0.4 K/UL    ABS. BASOPHILS 0.1 0.0 - 0.1 K/UL    ABS. IMM. GRANS. 0.1 (H) 0.00 - 0.04 K/UL    DF AUTOMATED     METABOLIC PANEL, COMPREHENSIVE    Collection Time: 05/25/22  9:12 AM   Result Value Ref Range    Sodium 141 136 - 145 mmol/L    Potassium 4.6 3.5 - 5.5 mmol/L    Chloride 102 100 - 111 mmol/L    CO2 30 21 - 32 mmol/L    Anion gap 9 3.0 - 18 mmol/L    Glucose 100 (H) 74 - 99 mg/dL    BUN 31 (H) 7.0 - 18 MG/DL    Creatinine 6.72 (H) 0.6 - 1.3 MG/DL    BUN/Creatinine ratio 5 (L) 12 - 20      GFR est AA 7 (L) >60 ml/min/1.73m2    GFR est non-AA 6 (L) >60 ml/min/1.73m2    Calcium 9.1 8.5 - 10.1 MG/DL    Bilirubin, total 0.4 0.2 - 1.0 MG/DL    ALT (SGPT) 16 13 - 56 U/L    AST (SGOT) 10 10 - 38 U/L    Alk. phosphatase 95 45 - 117 U/L    Protein, total 6.6 6.4 - 8.2 g/dL    Albumin 3.3 (L) 3.4 - 5.0 g/dL    Globulin 3.3 2.0 - 4.0 g/dL    A-G Ratio 1.0 0.8 - 1.7     TROPONIN-HIGH SENSITIVITY    Collection Time: 05/25/22  9:12 AM   Result Value Ref Range    Troponin-High Sensitivity 17 0 - 54 ng/L     Radiologic Studies -   XR CHEST PORT   Final Result    IMPRESSION:      1. No acute cardiopulmonary process. Medical Decision Making   I am the first provider for this patient.     I reviewed the vital signs, available nursing notes, past medical history, past surgical history, family history and social history. Vital Signs-Reviewed the patient's vital signs. EKG: Atrial Fibrillation, NO STEMI    Records Reviewed: Nursing Notes, Old Medical Records, Previous electrocardiograms, Previous Radiology Studies and Previous Laboratory Studies (Time of Review: 8:24 AM)    ED Course: Progress Notes, Reevaluation, and Consults:    ED Course as of 05/25/22 1036   Wed May 25, 2022   0903 Saw and evaluated patient at this time, no acute distress while resting in bed but says with any exertion she gets very weak lightheaded dyspneic and diaphoretic. Cardiology is already seen her at the bedside plans on cardiac catheterization today. Had planned on elective 1 but in light of her symptoms today we will proceed with cardiac cath today. Anticipate admission. [CB]   1794 Patient in NAD. No chest pain currently. Discussed with Attending ED Physician. Cardiology Mireille Ro at bedside after my exam. Plan for cardiac cath today and admission. Eliquis is being held [JK]   4755 Discussed with Dr Banerjee [facility] and they plan to do dialysis after cath. [JK]      ED Course User Index  [CB] Arturo Das MD  [JK] Rolando Aguirre DO     10:33: Discussed with [facility] for admission. [facility] requests to evaluate the patient after cath for evaluation of admission and states they will place the order. They also say they will discuss with cardiology s/p cath    Provider Notes (Medical Decision Making): MDM  Patient is a 64year old female with PMH chronic pain, end-stage renal disease on dialysis (Monday Wednesday Friday), hyperlipidemia, thyroid disease, neuropathy, depression, the presents emergency department with \"shortness of breath and chest pain\" suspect secondary to Unstable Angina. Discussed with cardiology NOVA Butler. Gary Sy for left and possible right heart cath today from the ED.   Full aspirin dose ordered. They recommended no other further medications at this time. Troponin 17    EKG showed no ischemic changes. In regards to her volume status she appears euvolemic. Discussed with Dr. Logan Puente nephrology he stated they would arrange for dialysis post catheterization. BMP no significant electrolyte abnormalities. Low clinical suspicion of acute heart failure exacerbation to euvolemic status. Low clinical suspicion of pneumonia given afebrile and white count within normal limits. Procedures None    Critical Care Time: 45 minutes    Diagnosis     Clinical Impression:   1. Chest pain, unspecified type    2. Coronary artery disease, unspecified vessel or lesion type, unspecified whether angina present, unspecified whether native or transplanted heart        Disposition: ADMIT, Cardiac CATH today    Follow-up Information     Follow up With Specialties Details Why Contact Info    Alondra Walsh MD [address]  [phone]             Current Discharge Medication List      CONTINUE these medications which have NOT CHANGED    Details   0.9% sodium chloride solp 100 mL with iron sucrose 20 MG/ML 50 mg.      doxercalciferol (HECTOROL IV) 5 mcg. fludrocortisone (FLORINEF) 0.1 mg tablet Take 1 Tablet by mouth.      midodrine (PROAMATINE) 10 mg tablet Take 10 mg by mouth three (3) times daily. ondansetron hcl (ZOFRAN) 4 mg tablet       melatonin 5 mg tablet Take 5 mg by mouth nightly. HYDROmorphone (DILAUDID) 2 mg tablet Take 1 mg by mouth every eight (8) hours as needed for Pain. Take 1/2 tablet by mouth every 8 hours as needed for pain level of 5 out of 10 or greater      allopurinoL (ZYLOPRIM) 100 mg tablet Take 1 Tablet by mouth every Monday, Wednesday, Friday.  [after hemodialysis]  Indications: treatment to prevent acute gout attack  Qty: 12 Tablet, Refills: 0    Associated Diagnoses: Uric acid nephrolithiasis      amiodarone (CORDARONE) 200 mg tablet Take 1 Tablet by mouth daily. Indications: prevention of recurrent atrial fibrillation  Qty: 30 Tablet, Refills: 0    Associated Diagnoses: Paroxysmal atrial fibrillation ([facility])      sevelamer carbonate (RENVELA) 800 mg tab tab Take 2 Tablets by mouth three (3) times daily (with meals). Indications: renal osteodystrophy with hyperphosphatemia  Qty: 180 Tablet, Refills: 0    Associated Diagnoses: Secondary hyperparathyroidism of renal origin ([facility] [address] End-stage renal disease on hemodialysis ([facility] [address] Hyperphosphatemia      sodium zirconium cyclosilicate (LOKELMA) 5 gram powder packet Take 1 Packet by mouth daily. Indications: high levels of potassium in the blood  Qty: 30 Packet, Refills: 0    Associated Diagnoses: Hyperkalemia      acetaminophen (Tylenol Extra Strength) 500 mg tablet Take 1 Tablet by mouth every six (6) hours as needed for Pain. Qty: 30 Tablet, Refills: 0      lidocaine (LIDODERM) 5 % Apply patch to the affected area for 12 hours a day and remove for 12 hours a day. Qty: 1 Each, Refills: 0      gabapentin (NEURONTIN) 100 mg capsule Take 2 Capsules by mouth Three (3) times a week. Max Daily Amount: 200 mg. Take 2 capsules by mouth 3 times a week as needed for pain post dialysis. Indications: concern for back pain post dialysis  Qty: 15 Capsule, Refills: 0    Associated Diagnoses: Type 2 diabetes mellitus with diabetic autonomic neuropathy, without long-term current use of insulin ([facility]); ESRD (end stage renal disease) ([facility])      apixaban (ELIQUIS) 5 mg tablet Take 1 Tablet by mouth two (2) times a day. Qty: 60 Tablet, Refills: 0      meclizine (ANTIVERT) 25 mg tablet Take 25 mg by mouth three (3) times daily as needed for Dizziness. methoxy peg-epoetin beta (MIRCERA INJECTION) 30 mcg. DULoxetine (Cymbalta) 20 mg capsule Take 20 mg by mouth daily. estradioL (Estrace) 0.01 % (0.1 mg/gram) vaginal cream Apply a fingertip amount around the urethra three times a week.   Qty: 30 g, Refills: 3      biotin 1,000 mcg chew Take 1 Tab by mouth daily. cyanocobalamin 1,000 mcg tablet Take 1,000 mcg by mouth daily. lactobacillus sp. 50 billion cpu (BIO-K PLUS) 50 billion cell -375 mg cap capsule Take 1 Cap by mouth daily. Qty: 30 Cap, Refills: 2      ascorbic acid, vitamin C, (VITAMIN C) 500 mg tablet Take 500 mg by mouth daily. calcitRIOL (ROCALTROL) 0.25 mcg capsule Take 0.25 mcg by mouth daily. cholecalciferol (VITAMIN D3) (2,000 UNITS /50 MCG) cap capsule Take 2,000 Units by mouth two (2) times a day. Take two tabs a total of 4000 units      latanoprost (XALATAN) 0.005 % ophthalmic solution Administer 1 Drop to both eyes nightly. One drop at bedtime      levothyroxine (SYNTHROID) 125 mcg tablet Take 125 mcg by mouth Daily (before breakfast). omeprazole (PRILOSEC) 20 mg capsule Take 20 mg by mouth daily. ondansetron (ZOFRAN ODT) 4 mg disintegrating tablet Take 4 mg by mouth every eight (8) hours as needed for Nausea or Vomiting. vit B Cmplx 3-FA-Vit C-Biotin (NEPHRO HOWIE RX) 1- mg-mg-mcg tablet Take 1 Tab by mouth daily. Disclaimer: Sections of this note are dictated using utilizing voice recognition software. Minor typographical errors may be present. If questions arise, please do not hesitate to contact me or call our department.

## 2022-05-25 NOTE — TELEPHONE ENCOUNTER
Called patient to give instructions on upcoming procedures. Patient states she is having more sob and fatigue. Unable to do minimal activities , such as getting up to get dressed wore her out. Patient was advised to got to the ED for further evaluation    Paged the PA and Dr. Robert Carter to make them aware.

## 2022-05-25 NOTE — Clinical Note
TRANSFER - IN REPORT:     Verbal report received from: Charlet Sandifer, RN. Report consisted of patient's Situation, Background, Assessment and   Recommendations(SBAR). Opportunity for questions and clarification was provided. Assessment completed upon patient's arrival to unit and care assumed. Patient transported with a Cardiac Cath Tech / Patient Care Tech.

## 2022-05-25 NOTE — Clinical Note
TRANSFER - OUT REPORT:     Verbal report given to: Husam Clark RN. Report consisted of patient's Situation, Background, Assessment and   Recommendations(SBAR). Opportunity for questions and clarification was provided. Patient transported with a Registered Nurse and [address] [place] [address] / [facility]. Patient transported to: [facility] area.

## 2022-05-25 NOTE — PROGRESS NOTES
Replaced for outpatient CATINA for pre-watchman procedural planning. Plan to continue eliquis for CATINA.      Bob Officer, MD, Quyen Sosa

## 2022-05-25 NOTE — TELEPHONE ENCOUNTER
----- Message from Kole Hammer sent at 5/24/2022 10:44 AM EDT -----  Regarding: RE: cath  Patient is scheduled on 5/31 @10:30 with Dr. Adithya Us. Please call with instructions.  ----- Message -----  From: Nan Kanner, MD  Sent: 5/13/2022  11:46 AM EDT  To: Leslie Hammer  Subject: RE: cath                                         Oh, it was when we had Vanesa Loweells? Staffing the clinic with me. Regardless, can we please set her up for LHC with possible PCI with me in next 1-2weeks please.   ----- Message -----  From: Jesus Harris Sent: 5/10/2022   1:09 PM EDT  To: Nilson Mendenhall MD  Subject: RE: cath                                         This was never sent to me to schedule?  ----- Message -----  From: Kayla Mirza  Sent: 5/10/2022  10:43 AM EDT  To: Kole Hammer  Subject: cath                                             Patient called wanting to know if her LHC has  been scheduled, please call

## 2022-05-25 NOTE — BRIEF OP NOTE
Brief Postoperative Note    Patient: Dc Traore  YOB: 1945  MRN: [id number]    Date of Procedure: 5/25/2022     Pre-Op Diagnosis: Coronary artery disease, unspecified vessel or lesion type, unspecified whether angina present, unspecified whether native or transplanted heart [I25.10]    Post-Op Diagnosis: Same as preoperative diagnosis. Procedure(s):  LEFT AND RIGHT HEART CATH / CORONARY ANGIOGRAPHY    Surgeon(s):  Betty Torres MD    Surgical Assistant: None    Anesthesia: Con-Sed     Estimated Blood Loss (mL): Minimal    Complications: None    Specimens: * No specimens in log *     Implants: * No implants in log *    Drains:   [REMOVED] Nephrostomy Tube 05/18/20 (Removed)       [REMOVED] Nephroureteral Drain 07/17/20 Right Ureter (Removed)       [REMOVED] Nephroureteral Drain 10/14/20 Right Ureter (Removed)       Findings: No obstructive coronary artery disease. Right radial arterial access status post sheath removal with radial band in place with 9 cc of air. Common femoral vein access under ultrasound guidance with 6 K[place] sheath. Right heart cath with mildly elevated filling pressures and mild pulmonary hypertension, normal cardiac output. Hemostasis achieved after sheath was removed with [place] in place.   LVEDP mildly elevated    Electronically Signed by Luna Carroll MD on 5/25/2022 at 1:02 PM

## 2022-05-25 NOTE — H&P
Admission History and Physical  [facility]          Patient: Michael Haynes MRN: [id number]  [facility]: [id number]    YOB: 1945  Age: 64 y.o. Sex: female      Admission Date: 5/25/2022       HPI:     Michael Haynes is a 64 y.o. female with PMH paroxysmal afib, ESRD on HD (MWF), HLD,  L hip fx (Nov 21), hypothyroidism, chronic hypotension, DMII (diet controlled) with neuropathy, depression, glaucoma, recurrent UTI's with hx of stones/R ureteral stent, gout,  and chronic low back pain now presenting with complaint of worsening SOB and weakness. Pt presented to the ED with SOB, chest tightness, and weakness that worsened this past weekend, but prior has been going on for a year. Pt had LHC with possible watchman procedure scheduled for May 31, but due to worsening sx's this past weekend, pt decided to come to the ED today. Last Eliquis was yesterday. She notes that the chest tightness and shortness of breath last for approximately 20 to 30 minutes while she is exerting herself and resolves when she rests. She also notes some diaphoresis with these symptoms. She does not take any nitro. Cardiology and nephrology consulted in the ED, and pt recieved dialysis after LHC today. ED Course (See objective for values/interpretations):  Vitals: HR 72, /51, RR 40143% on room air, T 98.1F  Labs obtained: Hgb 9.1. BUN 31, Cr 6.72. Troponin 17. Medications administered: Asa 325mg. Amiodarone. Imaging obtained: ECG 5/25-no ischemic changes. CXR 5/25-No acute cardiopulmonary process. Review of Systems   Constitutional: Positive for malaise/fatigue. Negative for chills and fever. Respiratory: Positive for shortness of breath. Negative for cough. Cardiovascular: Positive for chest pain. Negative for leg swelling. Gastrointestinal: Negative for nausea and vomiting. Skin: Negative for rash. Neurological: Negative for dizziness.        Past Medical History:   Diagnosis Date  Anemia in end-stage renal disease ([facility]     Anticoagulated by anticoagulation treatment     On Apixaban    Chronic hypotension     On Midodrine    Chronic pain     Closed fracture of left inferior pubic ramus, with routine healing, subsequent encounter 11/12/2021    Closed fracture of superior pubic ramus, left, with routine healing, subsequent encounter 11/12/2021    Closed nondisplaced fracture of anterior wall of left acetabulum with routine healing 11/12/2021    Depression     End-stage renal disease on hemodialysis ([facility]     HD at [facility].  Tel # [phone]    Gastroesophageal reflux disease     Glaucoma     History of acute pyelonephritis 2/20/2020    History of hydronephrosis 10/5/2021    History of infection with vancomycin resistant Enterococcus (VRE) 10/8/2021    Urine culture (collected 10/8/2021, resulted 10/14/2021) yielded growth of >100,000 colonies/ml of Enterococcus faecalis RESISTANT to Ciprofloxacin, Levofloxacin, Tetracycline and Vancomycin    History of kidney stones     History of recurrent urinary tract infection     History of sepsis 6/18/2021    History of septic shock 10/8/2021    History of urethral stricture     History of urinary tract infection 10/8/2021    Urine culture (collected 10/8/2021, resulted 10/14/2021) yielded growth of >100,000 colonies/ml of Enterococcus faecalis RESISTANT to Ciprofloxacin, Levofloxacin, Tetracycline and Vancomycin    Hyperlipidemia     Hyperphosphatemia 11/14/2021    Hypothyroidism     Lung mass     Mononeuropathy     Involving ring finger of left hand    Need for prophylactic isolation 10/8/2021    Urine culture (collected 10/8/2021, resulted 10/14/2021) yielded growth of >100,000 colonies/ml of Enterococcus faecalis RESISTANT to Ciprofloxacin, Levofloxacin, Tetracycline and Vancomycin    Paroxysmal atrial fibrillation (HCC)     Secondary hyperparathyroidism of renal origin ([facility]     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)     HbA1c (10/8/2021) = 4.6    Uric acid nephrolithiasis     Urinary incontinence        Past Surgical History:   Procedure Laterality Date    HX APPENDECTOMY      HX CHOLECYSTECTOMY      HX GASTRIC BYPASS      Gastric stapling    HX KNEE ARTHROSCOPY      HX UROLOGICAL      right PCN placement    HX UROLOGICAL  2018    RIGHT URETEROSCOPY WITH HOLMIUM LASER    IR EXCHANGE NEPHRO PERC LT SI  2020    IR EXCHANGE NEPHRO PERC RT SI  2020    IR EXCHANGE NEPHRO PERC RT SI  2020    IR NEPHROSTOMY PERC RT PLC CATH  SI  10/14/2020    IR NEPHROURETERAL PERC RT PLC CATH NEW ACCESS  SI  2020    TX INTRO CATH DIALYSIS CIRCUIT DX ANGRPH FLUOR S&I Left 2020    FISTULOGRAM LEFT/poss permanent catheter placement performed by Zaire Flynn MD at [facility] CATH [facility]    VASCULAR SURGERY PROCEDURE UNLIST      lef AVF       Family History   Problem Relation Age of Onset    Heart Surgery Sister        Social History     Socioeconomic History    Marital status: SINGLE   Tobacco Use    Smoking status: Never Smoker    Smokeless tobacco: Never Used   Vaping Use    Vaping Use: Never used   Substance and Sexual Activity    Alcohol use: Never    Drug use: Never       Allergies   Allergen Reactions    Albumin, Human 25 % Itching     Headache - severe migraine like, itchy eyes, runny nose    Ciprofloxacin Hives    Cyclopentolate Unknown (comments)    Iron Sucrose Diarrhea    Statins-Hmg-Coa Reductase Inhibitors Other (comments)     Body ache       Prior to Admission Medications   Prescriptions Last Dose Informant Patient Reported? Taking?   0.9% sodium chloride solp 100 mL with iron sucrose 20 MG/ML   Yes No   Si mg. Patient not taking: Reported on 2022   DULoxetine (Cymbalta) 20 mg capsule   Yes No   Sig: Take 20 mg by mouth daily.    HYDROmorphone (DILAUDID) 2 mg tablet   Yes No   Sig: Take 1 mg by mouth every eight (8) hours as needed for Pain. Take 1/2 tablet by mouth every 8 hours as needed for pain level of 5 out of 10 or greater   acetaminophen (Tylenol Extra Strength) 500 mg tablet   No No   Sig: Take 1 Tablet by mouth every six (6) hours as needed for Pain. allopurinoL (ZYLOPRIM) 100 mg tablet   No No   Sig: Take 1 Tablet by mouth every Monday, Wednesday, Friday. [after hemodialysis]  Indications: treatment to prevent acute gout attack   amiodarone (CORDARONE) 200 mg tablet   No No   Sig: Take 1 Tablet by mouth daily. Indications: prevention of recurrent atrial fibrillation   apixaban (ELIQUIS) 5 mg tablet   No No   Sig: Take 1 Tablet by mouth two (2) times a day. ascorbic acid, vitamin C, (VITAMIN C) 500 mg tablet   Yes No   Sig: Take 500 mg by mouth daily. biotin 1,000 mcg chew   Yes No   Sig: Take 1 Tab by mouth daily. calcitRIOL (ROCALTROL) 0.25 mcg capsule   Yes No   Sig: Take 0.25 mcg by mouth daily. cholecalciferol (VITAMIN D3) (2,000 UNITS /50 MCG) cap capsule   Yes No   Sig: Take 2,000 Units by mouth two (2) times a day. Take two tabs a total of 4000 units   cyanocobalamin 1,000 mcg tablet   Yes No   Sig: Take 1,000 mcg by mouth daily. doxercalciferol (HECTOROL IV)   Yes No   Si mcg.   estradioL (Estrace) 0.01 % (0.1 mg/gram) vaginal cream   No No   Sig: Apply a fingertip amount around the urethra three times a week. fludrocortisone (FLORINEF) 0.1 mg tablet   Yes No   Sig: Take 1 Tablet by mouth. Patient not taking: Reported on 2022   gabapentin (NEURONTIN) 100 mg capsule   No No   Sig: Take 2 Capsules by mouth Three (3) times a week. Max Daily Amount: 200 mg. Take 2 capsules by mouth 3 times a week as needed for pain post dialysis. Indications: concern for back pain post dialysis   Patient taking differently: Take 100 mg by mouth daily.  taking 1 pill at night before bed  Indications: concern for back pain post dialysis   lactobacillus sp. 50 billion cpu (BIO-K PLUS) 50 billion cell -375 mg cap capsule   No No   Sig: Take 1 Cap by mouth daily. Patient not taking: Reported on 2022   latanoprost (XALATAN) 0.005 % ophthalmic solution   Yes No   Sig: Administer 1 Drop to both eyes nightly. One drop at bedtime   levothyroxine (SYNTHROID) 125 mcg tablet   Yes No   Sig: Take 125 mcg by mouth Daily (before breakfast). lidocaine (LIDODERM) 5 %   No No   Sig: Apply patch to the affected area for 12 hours a day and remove for 12 hours a day. meclizine (ANTIVERT) 25 mg tablet   Yes No   Sig: Take 25 mg by mouth three (3) times daily as needed for Dizziness. melatonin 5 mg tablet   Yes No   Sig: Take 5 mg by mouth nightly. methoxy peg-epoetin beta (MIRCERA INJECTION)   Yes No   Si mcg.   midodrine (PROAMATINE) 10 mg tablet   Yes No   Sig: Take 10 mg by mouth three (3) times daily. omeprazole (PRILOSEC) 20 mg capsule   Yes No   Sig: Take 20 mg by mouth daily. ondansetron (ZOFRAN ODT) 4 mg disintegrating tablet   Yes No   Sig: Take 4 mg by mouth every eight (8) hours as needed for Nausea or Vomiting. ondansetron hcl (ZOFRAN) 4 mg tablet   Yes No   sevelamer carbonate (RENVELA) 800 mg tab tab   No No   Sig: Take 2 Tablets by mouth three (3) times daily (with meals). Indications: renal osteodystrophy with hyperphosphatemia   sodium zirconium cyclosilicate (LOKELMA) 5 gram powder packet   No No   Sig: Take 1 Packet by mouth daily. Indications: high levels of potassium in the blood   vit B Cmplx 3-FA-Vit C-Biotin (NEPHRO HOWIE RX) 1- mg-mg-mcg tablet   Yes No   Sig: Take 1 Tab by mouth daily.       Facility-Administered Medications: None       Objective:     Patient Vitals for the past 24 hrs:   Temp Pulse Resp BP SpO2   22 1915 -- 70 -- 121/62 --   22 1900 -- 68 -- (!) 140/43 --   22 1845 -- 68 -- (!) 118/43 --   22 1830 -- 70 -- (!) 137/55 --   22 1815 -- 70 -- (!) 140/40 --   22 1800 -- 67 -- (!) 129/95 --   22 1745 -- 69 -- (!) 126/51 --   22 1730 -- 70 -- (!) 135/48 --   05/25/22 1715 -- 79 -- 115/88 --   05/25/22 1700 -- (!) 55 -- 111/87 --   05/25/22 1645 -- (!) 55 -- 108/66 --   05/25/22 1630 -- 62 -- 115/73 --   05/25/22 1615 -- (!) 53 -- (!) 99/44 --   05/25/22 1600 -- (!) 56 -- (!) 121/40 --   05/25/22 1545 -- (!) 55 -- (!) 127/52 --   05/25/22 1530 97 °F (36.1 °C) (!) 55 16 (!) 124/46 --   05/25/22 1505 -- (!) 58 -- (!) 168/61 99 %   05/25/22 1435 -- (!) 56 12 (!) 151/83 98 %   05/25/22 1426 -- (!) 54 19 (!) 146/51 97 %   05/25/22 1420 -- (!) 56 16 (!) 156/56 100 %   05/25/22 1405 -- (!) 54 11 (!) 127/47 99 %   05/25/22 1350 -- (!) 52 17 (!) 132/50 98 %   05/25/22 1335 -- (!) 54 17 135/61 99 %   05/25/22 1320 -- (!) 52 16 (!) 119/49 98 %   05/25/22 1305 -- (!) 52 9 (!) 154/46 100 %   05/25/22 1250 -- (!) 56 18 (!) 169/62 99 %   05/25/22 1030 -- 72 14 -- 100 %   05/25/22 0800 98.1 °F (36.7 °C) -- -- -- --   05/25/22 0758 -- 72 18 (!) 146/51 100 %       Physical Exam:   General:  AAOx3, NAD   HEENT: Conjunctiva pink, sclera anicteric. EOMI. Pharynx moist, nonerythematous. Moist mucous membranes. CV:  RRR, no M/G/R. No visible pulsations or thrills. RESP:  Unlabored breathing. Lungs CTAB, no wheezes, rales or rhonchi appreciated. Equal expansion bilaterally. ABD:  Soft, nontender, nondistended. No suprapubic tenderness. MS:  No joint deformity or instability. No atrophy. Neuro:  CN II-XII grossly intact. 5/5 strength bilateral lower extremities. Upper extremity exam limited due to patient receiving dialysis.  strength intact bilaterally. Ext:  No edema. Skin:  No rashes, lesions, or ulcers. Good turgor.   Psych: normal mood and affect     Labs & Imaging:   Recent Results (from the past 48 hour(s))   EKG, 12 LEAD, INITIAL    Collection Time: 05/25/22  8:04 AM   Result Value Ref Range    Ventricular Rate 66 BPM    Atrial Rate 71 BPM    QRS Duration 96 ms    Q-T Interval 428 ms    QTC Calculation (Bezet) 448 ms    Calculated R Axis -26 degrees    Calculated T Axis 31 degrees    Diagnosis       Sinus rhythm with low amplitude p-waves  Left axis deviation      Confirmed by Dennis Urbina MD, Jp Wei ([id number]) on 5/25/2022 8:12:03 AM     CBC WITH AUTOMATED DIFF    Collection Time: 05/25/22  9:12 AM   Result Value Ref Range    WBC 5.0 4.6 - 13.2 K/uL    RBC 2.78 (L) 4.20 - 5.30 M/uL    HGB 9.1 (L) 12.0 - 16.0 g/dL    HCT 28.8 (L) 35.0 - 45.0 %    .6 (H) 78.0 - 100.0 FL    MCH 32.7 24.0 - 34.0 PG    MCHC 31.6 31.0 - 37.0 g/dL    RDW 14.6 (H) 11.6 - 14.5 %    PLATELET 965 218 - 185 K/uL    MPV 10.0 9.2 - 11.8 FL    NRBC 0.0 0  WBC    ABSOLUTE NRBC 0.00 0.00 - 0.01 K/uL    NEUTROPHILS 61 40 - 73 %    LYMPHOCYTES 23 21 - 52 %    MONOCYTES 12 (H) 3 - 10 %    EOSINOPHILS 2 0 - 5 %    BASOPHILS 1 0 - 2 %    IMMATURE GRANULOCYTES 1 (H) 0.0 - 0.5 %    ABS. NEUTROPHILS 3.1 1.8 - 8.0 K/UL    ABS. LYMPHOCYTES 1.1 0.9 - 3.6 K/UL    ABS. MONOCYTES 0.6 0.05 - 1.2 K/UL    ABS. EOSINOPHILS 0.1 0.0 - 0.4 K/UL    ABS. BASOPHILS 0.1 0.0 - 0.1 K/UL    ABS. IMM. GRANS. 0.1 (H) 0.00 - 0.04 K/UL    DF AUTOMATED     METABOLIC PANEL, COMPREHENSIVE    Collection Time: 05/25/22  9:12 AM   Result Value Ref Range    Sodium 141 136 - 145 mmol/L    Potassium 4.6 3.5 - 5.5 mmol/L    Chloride 102 100 - 111 mmol/L    CO2 30 21 - 32 mmol/L    Anion gap 9 3.0 - 18 mmol/L    Glucose 100 (H) 74 - 99 mg/dL    BUN 31 (H) 7.0 - 18 MG/DL    Creatinine 6.72 (H) 0.6 - 1.3 MG/DL    BUN/Creatinine ratio 5 (L) 12 - 20      GFR est AA 7 (L) >60 ml/min/1.73m2    GFR est non-AA 6 (L) >60 ml/min/1.73m2    Calcium 9.1 8.5 - 10.1 MG/DL    Bilirubin, total 0.4 0.2 - 1.0 MG/DL    ALT (SGPT) 16 13 - 56 U/L    AST (SGOT) 10 10 - 38 U/L    Alk.  phosphatase 95 45 - 117 U/L    Protein, total 6.6 6.4 - 8.2 g/dL    Albumin 3.3 (L) 3.4 - 5.0 g/dL    Globulin 3.3 2.0 - 4.0 g/dL    A-G Ratio 1.0 0.8 - 1.7     TROPONIN-HIGH SENSITIVITY    Collection Time: 05/25/22  9:12 AM   Result Value Ref Range Troponin-High Sensitivity 17 0 - 54 ng/L   ECHO ADULT FOLLOW-UP OR LIMITED    Collection Time: 05/25/22  2:51 PM   Result Value Ref Range    IVSd 0.9 0.6 - 0.9 cm    LVIDd 5.5 (A) 3.9 - 5.3 cm    LVIDs 3.5 cm    LVPWd 1.2 (A) 0.6 - 0.9 cm    TR Peak Gradient 20 mmHg    TR Max Velocity 2.25 m/s    Fractional Shortening 2D 36 28 - 44 %    LVIDd Index 2.88 cm/m2    LVIDs Index 1.83 cm/m2    LV RWT Ratio 0.44     LV Mass 2D 227.4 (A) 67 - 162 g    LV Mass 2D Index 119.1 (A) 43 - 95 g/m2       Assessment & Plan:   64 y.o. female with PMH of paroxysmal afib, ESRD on HD (MWF), HLD,  L hip fx (Nov 21), hypothyroidism, chronic hypotension, DMII (diet controlled) with neuropathy, depression, glaucoma, recurrent UTI's with hx of stones/R ureteral stent, gout, and chronic low back pain, now admitted with unstable angina pending heart cath. Dyspnea on exertion, weakness  First occurred only at rest, but now exacerbated with minimal activity such as walking around home. Scheduled for outpatient cath procedure on 529, however symptoms worsen prior to this time, so patient presented at the ED. Initial concern for cardiac etiology and pt taken for cath. Follows with Dr.East jefferspt. ECG and troponins wnl on admission. CXR wnl. Heart cath findings 5/25: no obstructive CAD. RHC  With mildly elevated filling pressures and mild pulmonary HTN, normal CO. ECHO 5/25: Normal left ventricular systolic function with a visually estimated EF of 55 - 60%. Left ventricle size is normal. Normal wall thickness. Normal wall motion. Per Dr. Oskar Gregory, cardiology no clear cardiac etiology for patient's significant dyspnea on exertion and weakness. Will investigate other etiologies including worsening of chronic anemia, pulmonary source or endocrine disruptions.   Plan:  - admit to medicine w/ tele  - start Asa 325mg  - cards on board, appreciate recs  - will consult pulmonology in the morning.   - Orthostatic hypotension testing  - TSH [facility]  - will attempt DuoNeb prior to ambulation to evaluate effect on WU  - daily CBC, CMP, Mg, Phos      Paroxysmal afib  Home meds: amiodarone 200mg , Eliquis 5mg bid  (last took [facility] on 5/24 am)  Plan:   -amiodarone resumed   -resume home Eliquis am of 5/26 per cards recs   - needs outpt [facility] prior to watchman device, to be scheduled by  's office      ESRD on HD (MWF)  Nephrology consulted in the ED. HD after procedure today. Dr. Cyn Wood would like to do additional dialysis tomorrow as well. Plan:   - FU nephro recs      Chronic hypotension  - Continue Midodrine 10mg tid. Hypothyroidism  TSH 1.75 in March 2021. Plan:   -cont home levothyroxine 125mcg      DM II with neuropathy  HbA1c 4.9% on 5/19/22. Diet controlled  Plan:   -Gabapentin 200mg tid    Depression  Continue Duloxetine 20mg. Gout  Continue Allopurinol 100mg MWF. Osteopenia w/ hx of closed L pelvic fx (Nov 2021)  Pt was admitted to [address] [place] [address] in Nov 2021 for multiple closed PV fx's that did not require surgery, but pt went to ARU  Dexa scan with t score -1.5 in March 2022.  Vit D    Global Care:  - VS per unit routine  - supplemental O2 for sats < 92%  - incentive spirometer  - PT/OT/CM    Diet  nephro diet   DVT Prophylaxis  Eliquis   GI Prophylaxis  Pantoprazole    Code status  DNR   Disposition  home w/ Sophy Parnell MD , PGY-2   [facility] [place] [facility] [facility]   May 25, 2022, 16:03 AM

## 2022-05-25 NOTE — ROUTINE PROCESS
TRANSFER - OUT REPORT:    Verbal report given to JOSE ANTONIO Church(name) on Darion Stern  being transferred to 2313(unit) for routine progression of care       Report consisted of patients Situation, Background, Assessment and   Recommendations(SBAR). Information from the following report(s) SBAR was reviewed with the receiving nurse. Lines:   Venous Access Device AV Fistula (Active)       Peripheral IV 05/25/22 Anterior;Right (Active)   Site Assessment Clean, dry, & intact 05/25/22 1030   Phlebitis Assessment 0 05/25/22 1030   Infiltration Assessment 0 05/25/22 1030   Dressing Status Clean, dry, & intact 05/25/22 1030   Dressing Type Transparent 05/25/22 1030   Hub Color/Line Status Blue;Flushed 05/25/22 1030      Patient transported to dialysis center, then will be sent to room 2313 for probable discharge. A+Ox4, good respiratory effort. Denied any complaints. Opportunity for questions and clarification was provided. Patient transported with:   Registered Nurse: MEGHANN Farfan

## 2022-05-25 NOTE — PROGRESS NOTES
Consult Note  Consult requested by: Dr. Pham Toya is a 64 y.o. female [address] [facility] who is being seen on consult for ESRD management. Chief Complaint   Patient presents with    Shortness of Breath    Fatigue     Admission diagnosis: shortness of breath. 63y F with PMH ESRD, DM, afib, admitted for chest discomfort,following for ESRD management. Impression & Plan:   IMPRESSION:   ESRD, TTS, last HD yesterday   Access; left arm AVF  Chest discomfort and short of breath, suspect ACS, awaiting for cardiac cath  afib with rvr  H/o nephrolithiasis, complicated UTI  H/o hypotension during dialysis    PLAN:    Plan for HD today with 2K bath UF goal 1.5-2L tolerated after cardiac cath. Will arrange HD upstair or bedside depends on clinical stability , discussed with HD nurse. Adjust meds per ESRD status. Thank you very much for allowing me to participate in the care of this patient. We will continue to monitor with you and make recommendations as needed.       HPI:  63y F with PMH ESRD, afib , h/o recurrent nephrolithiasis, was sent to ER by her cardiology to ER for chest discomfort. She admits she easily get out of breath with minimal exertion which is relatively new. She has known afib and awaiting for watchman procedure. In ER she was evaluated by cardiology colleague and plan for cardiac cath noted. During my visit, she denies for any new chest pain, palpitation. Plan for HD after cath discussed which she agrees.    Past Medical History:   Diagnosis Date    Anemia in end-stage renal disease ([facility] [address]     Anticoagulated by anticoagulation treatment     On Apixaban    Chronic hypotension     On Midodrine    Chronic pain     Closed fracture of left inferior pubic ramus, with routine healing, subsequent encounter 11/12/2021    Closed fracture of superior pubic ramus, left, with routine healing, subsequent encounter 11/12/2021    Closed nondisplaced fracture of anterior wall of left acetabulum with routine healing 11/12/2021    Depression     End-stage renal disease on hemodialysis ([address]     HD at [address] on [facility] on MWF.  Tel # [phone]    Gastroesophageal reflux disease     Glaucoma     History of acute pyelonephritis 2/20/2020    History of hydronephrosis 10/5/2021    History of infection with vancomycin resistant Enterococcus (VRE) 10/8/2021    Urine culture (collected 10/8/2021, resulted 10/14/2021) yielded growth of >100,000 colonies/ml of Enterococcus faecalis RESISTANT to Ciprofloxacin, Levofloxacin, Tetracycline and Vancomycin    History of kidney stones     History of recurrent urinary tract infection     History of sepsis 6/18/2021    History of septic shock 10/8/2021    History of urethral stricture     History of urinary tract infection 10/8/2021    Urine culture (collected 10/8/2021, resulted 10/14/2021) yielded growth of >100,000 colonies/ml of Enterococcus faecalis RESISTANT to Ciprofloxacin, Levofloxacin, Tetracycline and Vancomycin    Hyperlipidemia     Hyperphosphatemia 11/14/2021    Hypothyroidism     Lung mass     Mononeuropathy     Involving ring finger of left hand    Need for prophylactic isolation 10/8/2021    Urine culture (collected 10/8/2021, resulted 10/14/2021) yielded growth of >100,000 colonies/ml of Enterococcus faecalis RESISTANT to Ciprofloxacin, Levofloxacin, Tetracycline and Vancomycin    Paroxysmal atrial fibrillation (HCC)     Secondary hyperparathyroidism of renal origin ([facility] [address]     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 ([facility] [address]     HbA1c (10/8/2021) = 4.6    Uric acid nephrolithiasis     Urinary incontinence       Past Surgical History:   Procedure Laterality Date    HX APPENDECTOMY      HX CHOLECYSTECTOMY      HX GASTRIC BYPASS      Gastric stapling    HX KNEE ARTHROSCOPY      HX UROLOGICAL      right PCN placement    HX UROLOGICAL  07/23/2018    RIGHT URETEROSCOPY WITH HOLMIUM LASER    IR EXCHANGE NEPHRO PERC LT SI  2/21/2020    IR EXCHANGE NEPHRO PERC RT SI  4/13/2020    IR EXCHANGE NEPHRO PERC RT SI  7/17/2020    IR NEPHROSTOMY PERC RT PLC CATH  SI  10/14/2020    IR NEPHROURETERAL PERC RT PLC CATH NEW ACCESS  SI  4/30/2020    [place] INTRO CATH DIALYSIS CIRCUIT DX ANGRPH FLUOR S&I Left 9/24/2020    FISTULOGRAM LEFT/poss permanent catheter placement performed by Jj Villa MD at [facility]ATH [facility]    VASCULAR SURGERY PROCEDURE UNLIST      lef AVF       Social History     Socioeconomic History    Marital status: SINGLE     Spouse name: Not on file    Number of children: Not on file    Years of education: Not on file    Highest education level: Not on file   Occupational History    Not on file   Tobacco Use    Smoking status: Never Smoker    Smokeless tobacco: Never Used   Vaping Use    Vaping Use: Never used   Substance and Sexual Activity    Alcohol use: Never    Drug use: Never    Sexual activity: Not on file   Other Topics Concern    Not on file   Social History Narrative    Not on file     Social Determinants of Health     Financial Resource Strain:     Difficulty of Paying Living Expenses: Not on file   Food Insecurity:     Worried About Running Out of Food in the Last Year: Not on file    Cheryl of Food in the Last Year: Not on file   Transportation Needs:     Lack of Transportation (Medical): Not on file    Lack of Transportation (Non-Medical):  Not on file   Physical Activity:     Days of Exercise per Week: Not on file    Minutes of Exercise per Session: Not on file   Stress:     Feeling of Stress : Not on file   Social Connections:     Frequency of Communication with Friends and Family: Not on file    Frequency of Social Gatherings with Friends and Family: Not on file    Attends Protestant Services: Not on file    Active Member of Clubs or Organizations: Not on file    Attends Club or Organization Meetings: Not on file    Marital Status: Not on file   Intimate Partner Violence:     Fear of Current or Ex-Partner: Not on file    Emotionally Abused: Not on file    Physically Abused: Not on file    Sexually Abused: Not on file   Housing Stability:     Unable to Pay for Housing in the Last Year: Not on file    Number of Places Lived in the Last Year: Not on file    Unstable Housing in the Last Year: Not on file       Family History   Problem Relation Age of Onset    Heart Surgery Sister      Allergies   Allergen Reactions    Albumin, Human 25 % Itching     Headache - severe migraine like, itchy eyes, runny nose    Ciprofloxacin Hives    Cyclopentolate Unknown (comments)    Iron Sucrose Diarrhea    Statins-Hmg-Coa Reductase Inhibitors Other (comments)     Body ache        Home Medications:     Prior to Admission Medications   Prescriptions Last Dose Informant Patient Reported? Taking?   0.9% sodium chloride solp 100 mL with iron sucrose 20 MG/ML   Yes No   Si mg. Patient not taking: Reported on 2022   DULoxetine (Cymbalta) 20 mg capsule   Yes No   Sig: Take 20 mg by mouth daily. HYDROmorphone (DILAUDID) 2 mg tablet   Yes No   Sig: Take 1 mg by mouth every eight (8) hours as needed for Pain. Take 1/2 tablet by mouth every 8 hours as needed for pain level of 5 out of 10 or greater   acetaminophen (Tylenol Extra Strength) 500 mg tablet   No No   Sig: Take 1 Tablet by mouth every six (6) hours as needed for Pain. allopurinoL (ZYLOPRIM) 100 mg tablet   No No   Sig: Take 1 Tablet by mouth every Monday, Wednesday, Friday. [after hemodialysis]  Indications: treatment to prevent acute gout attack   amiodarone (CORDARONE) 200 mg tablet   No No   Sig: Take 1 Tablet by mouth daily. Indications: prevention of recurrent atrial fibrillation   apixaban (ELIQUIS) 5 mg tablet   No No   Sig: Take 1 Tablet by mouth two (2) times a day. ascorbic acid, vitamin C, (VITAMIN C) 500 mg tablet   Yes No   Sig: Take 500 mg by mouth daily.    biotin 1,000 mcg chew   Yes No   Sig: Take 1 Tab by mouth daily. calcitRIOL (ROCALTROL) 0.25 mcg capsule   Yes No   Sig: Take 0.25 mcg by mouth daily. cholecalciferol (VITAMIN D3) (2,000 UNITS /50 MCG) cap capsule   Yes No   Sig: Take 2,000 Units by mouth two (2) times a day. Take two tabs a total of 4000 units   cyanocobalamin 1,000 mcg tablet   Yes No   Sig: Take 1,000 mcg by mouth daily. doxercalciferol (HECTOROL IV)   Yes No   Si mcg.   estradioL (Estrace) 0.01 % (0.1 mg/gram) vaginal cream   No No   Sig: Apply a fingertip amount around the urethra three times a week. fludrocortisone (FLORINEF) 0.1 mg tablet   Yes No   Sig: Take 1 Tablet by mouth. Patient not taking: Reported on 2022   gabapentin (NEURONTIN) 100 mg capsule   No No   Sig: Take 2 Capsules by mouth Three (3) times a week. Max Daily Amount: 200 mg. Take 2 capsules by mouth 3 times a week as needed for pain post dialysis. Indications: concern for back pain post dialysis   Patient taking differently: Take 100 mg by mouth daily. taking 1 pill at night before bed  Indications: concern for back pain post dialysis   lactobacillus sp. 50 billion cpu (BIO-K PLUS) 50 billion cell -375 mg cap capsule   No No   Sig: Take 1 Cap by mouth daily. Patient not taking: Reported on 2022   latanoprost (XALATAN) 0.005 % ophthalmic solution   Yes No   Sig: Administer 1 Drop to both eyes nightly. One drop at bedtime   levothyroxine (SYNTHROID) 125 mcg tablet   Yes No   Sig: Take 125 mcg by mouth Daily (before breakfast). lidocaine (LIDODERM) 5 %   No No   Sig: Apply patch to the affected area for 12 hours a day and remove for 12 hours a day. meclizine (ANTIVERT) 25 mg tablet   Yes No   Sig: Take 25 mg by mouth three (3) times daily as needed for Dizziness. melatonin 5 mg tablet   Yes No   Sig: Take 5 mg by mouth nightly.    methoxy peg-epoetin beta (MIRCERA INJECTION)   Yes No   Si mcg.   midodrine (PROAMATINE) 10 mg tablet   Yes No   Sig: Take 10 mg by mouth three (3) times daily.   omeprazole (PRILOSEC) 20 mg capsule   Yes No   Sig: Take 20 mg by mouth daily. ondansetron (ZOFRAN ODT) 4 mg disintegrating tablet   Yes No   Sig: Take 4 mg by mouth every eight (8) hours as needed for Nausea or Vomiting. ondansetron hcl (ZOFRAN) 4 mg tablet   Yes No   sevelamer carbonate (RENVELA) 800 mg tab tab   No No   Sig: Take 2 Tablets by mouth three (3) times daily (with meals). Indications: renal osteodystrophy with hyperphosphatemia   sodium zirconium cyclosilicate (LOKELMA) 5 gram powder packet   No No   Sig: Take 1 Packet by mouth daily. Indications: high levels of potassium in the blood   vit B Cmplx 3-FA-Vit C-Biotin (NEPHRO HOWIE RX) 1- mg-mg-mcg tablet   Yes No   Sig: Take 1 Tab by mouth daily. Facility-Administered Medications: None       Current Facility-Administered Medications   Medication Dose Route Frequency    aspirin tablet 325 mg  325 mg Oral DAILY    [START ON 5/26/2022] amiodarone (CORDARONE) tablet 200 mg  200 mg Oral DAILY    0.9% sodium chloride infusion 250 mL  250 mL IntraVENous DIALYSIS PRN    heparinized saline 2 units/mL infusion    CONTINUOUS    midazolam (VERSED) injection    PRN    fentaNYL citrate (PF) injection    PRN    lidocaine (PF) (XYLOCAINE) 10 mg/mL (1 %) injection    PRN    verapamiL (ISOPTIN) 2.5 mg/mL injection    PRN    nitroglycerine compounded 400 mcg/mL injection    PRN       Review of Systems:     Complete 10-point review of systems were obtained and discussed in length  with the patient. Complete review of systems was negative/unremarkable  except as mentioned in HPI section.   Data Review:    Labs: Results:       Chemistry Recent Labs     05/25/22  0912   *      K 4.6      CO2 30   BUN 31*   CREA 6.72*   CA 9.1   AGAP 9   BUCR 5*   AP 95   TP 6.6   ALB 3.3*   GLOB 3.3   AGRAT 1.0      CBC w/Diff Recent Labs     05/25/22  0912   WBC 5.0   RBC 2.78*   HGB 9.1*   HCT 28.8*      GRANS 61   LYMPH 23   EOS 2 Coagulation No results for input(s): PTP, INR, APTT, INREXT in the last 72 hours. Iron/Ferritin No results for input(s): IRON in the last 72 hours. No lab exists for component: TIBCCALC   BNP No results for input(s): BNPP in the last 72 hours. Cardiac Enzymes No results for input(s): CPK, CKND1, PATTI in the last 72 hours.     No lab exists for component: CKRMB, TROIP   Liver Enzymes Recent Labs     05/25/22  0912   TP 6.6   ALB 3.3*   AP 95      Thyroid Studies Lab Results   Component Value Date/Time    TSH 1.69 11/22/2021 05:27 AM         EKG: afib with rvr     Physical Assessment:     Visit Vitals  BP (!) 146/51 (BP 1 Location: Left upper arm, BP Patient Position: At rest)   Pulse 72   Temp 98.1 °F (36.7 °C)   Resp 14   Ht 5' 2\" (1.575 m)   Wt 91 kg (200 lb 9.9 oz)   SpO2 100%   BMI 36.69 kg/m²     Weight change:   No intake or output data in the 24 hours ending 05/25/22 1207  Physical Exam:   General: comfortable, no acute distress   HEENT sclera anicteric, supple neck, no thyromegaly  CVS: S1S2 heard,  no rub  RS: + air entry b/l,   Abd: Soft, Non tender, Not distended  Neuro: non focal, awake, alert , CN II-XII are grossly intact  Extrm: no edema, no cyanosis, clubbing   Skin: no visible  Rash  Musculoskeletal: No gross joints or bone deformities     Procedures/imaging: see electronic medical records for all procedures, Xrays and details which were not copied into this note but were reviewed prior to creation of Moise Bailey MD  May 25, 2022  [facility]  Office [phone]

## 2022-05-26 LAB
ALBUMIN SERPL-MCNC: 2.9 G/DL (ref 3.4–5)
ALBUMIN/GLOB SERPL: 0.8 {RATIO} (ref 0.8–1.7)
ALP SERPL-CCNC: 88 U/L (ref 45–117)
ALT SERPL-CCNC: 13 U/L (ref 13–56)
ANION GAP SERPL CALC-SCNC: 5 MMOL/L (ref 3–18)
APPEARANCE UR: ABNORMAL
AST SERPL-CCNC: 14 U/L (ref 10–38)
BASOPHILS # BLD: 0 K/UL (ref 0–0.1)
BASOPHILS NFR BLD: 1 % (ref 0–2)
BILIRUB SERPL-MCNC: 0.4 MG/DL (ref 0.2–1)
BILIRUB UR QL: ABNORMAL
BUN SERPL-MCNC: 12 MG/DL (ref 7–18)
BUN/CREAT SERPL: 3 (ref 12–20)
CALCIUM SERPL-MCNC: 9.2 MG/DL (ref 8.5–10.1)
CHLORIDE SERPL-SCNC: 103 MMOL/L (ref 100–111)
CO2 SERPL-SCNC: 33 MMOL/L (ref 21–32)
COLOR UR: ABNORMAL
CREAT SERPL-MCNC: 4.04 MG/DL (ref 0.6–1.3)
DIFFERENTIAL METHOD BLD: ABNORMAL
EOSINOPHIL # BLD: 0.1 K/UL (ref 0–0.4)
EOSINOPHIL NFR BLD: 3 % (ref 0–5)
EPITH CASTS URNS QL MICRO: NORMAL /LPF (ref 0–5)
ERYTHROCYTE [DISTWIDTH] IN BLOOD BY AUTOMATED COUNT: 14.6 % (ref 11.6–14.5)
GLOBULIN SER CALC-MCNC: 3.6 G/DL (ref 2–4)
GLUCOSE SERPL-MCNC: 97 MG/DL (ref 74–99)
GLUCOSE UR STRIP.AUTO-MCNC: 100 MG/DL
HCT VFR BLD AUTO: 27.8 % (ref 35–45)
HGB BLD-MCNC: 8.9 G/DL (ref 12–16)
HGB UR QL STRIP: ABNORMAL
IMM GRANULOCYTES # BLD AUTO: 0 K/UL (ref 0–0.04)
IMM GRANULOCYTES NFR BLD AUTO: 1 % (ref 0–0.5)
KETONES UR QL STRIP.AUTO: NEGATIVE MG/DL
LEUKOCYTE ESTERASE UR QL STRIP.AUTO: ABNORMAL
LYMPHOCYTES # BLD: 1.4 K/UL (ref 0.9–3.6)
LYMPHOCYTES NFR BLD: 36 % (ref 21–52)
MAGNESIUM SERPL-MCNC: 2.3 MG/DL (ref 1.6–2.6)
MCH RBC QN AUTO: 33.3 PG (ref 24–34)
MCHC RBC AUTO-ENTMCNC: 32 G/DL (ref 31–37)
MCV RBC AUTO: 104.1 FL (ref 78–100)
MONOCYTES # BLD: 0.5 K/UL (ref 0.05–1.2)
MONOCYTES NFR BLD: 12 % (ref 3–10)
NEUTS SEG # BLD: 1.9 K/UL (ref 1.8–8)
NEUTS SEG NFR BLD: 48 % (ref 40–73)
NITRITE UR QL STRIP.AUTO: NEGATIVE
NRBC # BLD: 0 K/UL (ref 0–0.01)
NRBC BLD-RTO: 0 PER 100 WBC
OTHER,OTHU: NORMAL
PH UR STRIP: 8.5 [PH] (ref 5–8)
PLATELET # BLD AUTO: 218 K/UL (ref 135–420)
PMV BLD AUTO: 9.8 FL (ref 9.2–11.8)
POTASSIUM SERPL-SCNC: 4.3 MMOL/L (ref 3.5–5.5)
PROT SERPL-MCNC: 6.5 G/DL (ref 6.4–8.2)
PROT UR STRIP-MCNC: >300 MG/DL
RBC # BLD AUTO: 2.67 M/UL (ref 4.2–5.3)
RBC #/AREA URNS HPF: NORMAL /HPF (ref 0–5)
SODIUM SERPL-SCNC: 141 MMOL/L (ref 136–145)
SP GR UR REFRACTOMETRY: 1.02 (ref 1–1.03)
UROBILINOGEN UR QL STRIP.AUTO: 0.2 EU/DL (ref 0.2–1)
WBC # BLD AUTO: 3.9 K/UL (ref 4.6–13.2)
WBC URNS QL MICRO: NORMAL /HPF (ref 0–4)

## 2022-05-26 PROCEDURE — 74011250637 HC RX REV CODE- 250/637: Performed by: STUDENT IN AN ORGANIZED HEALTH CARE EDUCATION/TRAINING PROGRAM

## 2022-05-26 PROCEDURE — 99232 SBSQ HOSP IP/OBS MODERATE 35: CPT | Performed by: INTERNAL MEDICINE

## 2022-05-26 PROCEDURE — 97161 PT EVAL LOW COMPLEX 20 MIN: CPT

## 2022-05-26 PROCEDURE — 90935 HEMODIALYSIS ONE EVALUATION: CPT

## 2022-05-26 PROCEDURE — G0378 HOSPITAL OBSERVATION PER HR: HCPCS

## 2022-05-26 PROCEDURE — 81001 URINALYSIS AUTO W/SCOPE: CPT

## 2022-05-26 PROCEDURE — 74011000250 HC RX REV CODE- 250: Performed by: STUDENT IN AN ORGANIZED HEALTH CARE EDUCATION/TRAINING PROGRAM

## 2022-05-26 PROCEDURE — 83735 ASSAY OF MAGNESIUM: CPT

## 2022-05-26 PROCEDURE — 74011250637 HC RX REV CODE- 250/637: Performed by: PHYSICIAN ASSISTANT

## 2022-05-26 PROCEDURE — 36415 COLL VENOUS BLD VENIPUNCTURE: CPT

## 2022-05-26 PROCEDURE — 99222 1ST HOSP IP/OBS MODERATE 55: CPT | Performed by: INTERNAL MEDICINE

## 2022-05-26 PROCEDURE — 80053 COMPREHEN METABOLIC PANEL: CPT

## 2022-05-26 PROCEDURE — 85025 COMPLETE CBC W/AUTO DIFF WBC: CPT

## 2022-05-26 PROCEDURE — 97535 SELF CARE MNGMENT TRAINING: CPT

## 2022-05-26 PROCEDURE — 2709999900 HC NON-CHARGEABLE SUPPLY

## 2022-05-26 PROCEDURE — 97116 GAIT TRAINING THERAPY: CPT

## 2022-05-26 PROCEDURE — 97165 OT EVAL LOW COMPLEX 30 MIN: CPT

## 2022-05-26 PROCEDURE — G0257 UNSCHED DIALYSIS ESRD PT HOS: HCPCS

## 2022-05-26 PROCEDURE — 74011000250 HC RX REV CODE- 250

## 2022-05-26 PROCEDURE — 65270000029 HC RM PRIVATE

## 2022-05-26 RX ORDER — IPRATROPIUM BROMIDE AND ALBUTEROL SULFATE 2.5; .5 MG/3ML; MG/3ML
3 SOLUTION RESPIRATORY (INHALATION) ONCE
Status: COMPLETED | OUTPATIENT
Start: 2022-05-26 | End: 2022-05-26

## 2022-05-26 RX ADMIN — AMIODARONE HYDROCHLORIDE 200 MG: 200 TABLET ORAL at 09:02

## 2022-05-26 RX ADMIN — APIXABAN 5 MG: 5 TABLET, FILM COATED ORAL at 09:03

## 2022-05-26 RX ADMIN — SODIUM CHLORIDE, PRESERVATIVE FREE 10 ML: 5 INJECTION INTRAVENOUS at 16:01

## 2022-05-26 RX ADMIN — ACETAMINOPHEN 650 MG: 325 TABLET ORAL at 09:03

## 2022-05-26 RX ADMIN — CALCITRIOL CAPSULES 0.25 MCG 0.25 MCG: 0.25 CAPSULE ORAL at 09:04

## 2022-05-26 RX ADMIN — SEVELAMER CARBONATE 1600 MG: 800 TABLET, FILM COATED ORAL at 17:41

## 2022-05-26 RX ADMIN — DULOXETINE 20 MG: 20 CAPSULE, DELAYED RELEASE ORAL at 09:05

## 2022-05-26 RX ADMIN — SODIUM CHLORIDE, PRESERVATIVE FREE 10 ML: 5 INJECTION INTRAVENOUS at 21:32

## 2022-05-26 RX ADMIN — SODIUM CHLORIDE, PRESERVATIVE FREE 10 ML: 5 INJECTION INTRAVENOUS at 06:30

## 2022-05-26 RX ADMIN — ASPIRIN 325 MG ORAL TABLET 325 MG: 325 PILL ORAL at 09:02

## 2022-05-26 RX ADMIN — Medication 5 MG: at 21:27

## 2022-05-26 RX ADMIN — APIXABAN 5 MG: 5 TABLET, FILM COATED ORAL at 17:42

## 2022-05-26 RX ADMIN — ACETAMINOPHEN 650 MG: 325 TABLET ORAL at 01:29

## 2022-05-26 RX ADMIN — SEVELAMER CARBONATE 1600 MG: 800 TABLET, FILM COATED ORAL at 08:08

## 2022-05-26 RX ADMIN — MIDODRINE HYDROCHLORIDE 10 MG: 5 TABLET ORAL at 11:24

## 2022-05-26 RX ADMIN — IPRATROPIUM BROMIDE AND ALBUTEROL SULFATE 3 ML: .5; 2.5 SOLUTION RESPIRATORY (INHALATION) at 09:33

## 2022-05-26 RX ADMIN — GABAPENTIN 100 MG: 100 CAPSULE ORAL at 21:27

## 2022-05-26 RX ADMIN — MIDODRINE HYDROCHLORIDE 10 MG: 5 TABLET ORAL at 17:42

## 2022-05-26 RX ADMIN — LEVOTHYROXINE SODIUM 125 MCG: 125 TABLET ORAL at 08:07

## 2022-05-26 RX ADMIN — PANTOPRAZOLE SODIUM 40 MG: 40 TABLET, DELAYED RELEASE ORAL at 09:05

## 2022-05-26 RX ADMIN — MIDODRINE HYDROCHLORIDE 10 MG: 5 TABLET ORAL at 08:03

## 2022-05-26 RX ADMIN — LATANOPROST 1 DROP: 50 SOLUTION OPHTHALMIC at 21:37

## 2022-05-26 NOTE — PROGRESS NOTES
OCCUPATIONAL THERAPY EVALUATION/DISCHARGE    Patient: Dc Traore [de-identified]64 y.o. female)  Date: 5/26/2022  Primary Diagnosis: ESRD on dialysis ([facility] [address] [N18.6, Z99.2]  SOB (shortness of breath) on exertion [R06.02]  Procedure(s) (LRB):  LEFT AND RIGHT HEART CATH / CORONARY ANGIOGRAPHY (Right) 1 Day Post-Op   Precautions:   Contact  PLOF: Pt reports being Mod Ind for ADLs and functional mobility with FWW. Pt lives with son and daughter in law, who work during the day. Pt takes bus to go to dialysis. ASSESSMENT AND RECOMMENDATIONS:  Based on the objective data described below, the patient presents with ability to perform basic ADLs and functional transfers at baseline level of function. Pt performed/simulated UB/LB ADLs with Mod Ind for seated and std aspects. Pt demonstrates good safety awareness during all standing tasks using FWW for support. Pt reports fatigue with activity. Pt educated on mult energy conservation techniques to utilize in home environment and while at the hospital, including pacing and deep breathing to prevent SOB and fatigue, and increase activity tolerance and safety w/ADLs and functional mobility, pt verbalized understanding. Pt lives with supportive family available to assist as needed. Skilled occupational therapy is not indicated at this time. Discharge Recommendations: Outpatient Cardiac Rehab vs Outpatient PT   Further Equipment Recommendations for Discharge: N/A      SUBJECTIVE:   Patient stated I take a lot of rest breaks.     OBJECTIVE DATA SUMMARY:     Past Medical History:   Diagnosis Date    Anemia in end-stage renal disease ([facility] [address]     Anticoagulated by anticoagulation treatment     On Apixaban    Chronic hypotension     On Midodrine    Chronic pain     Closed fracture of left inferior pubic ramus, with routine healing, subsequent encounter 11/12/2021    Closed fracture of superior pubic ramus, left, with routine healing, subsequent encounter 11/12/2021    Closed nondisplaced fracture of anterior wall of left acetabulum with routine healing 11/12/2021    Depression     End-stage renal disease on hemodialysis ([facility] [address]     HD at [facility] on MWF.  Tel # [phone]    Gastroesophageal reflux disease     Glaucoma     History of acute pyelonephritis 2/20/2020    History of hydronephrosis 10/5/2021    History of infection with vancomycin resistant Enterococcus (VRE) 10/8/2021    Urine culture (collected 10/8/2021, resulted 10/14/2021) yielded growth of >100,000 colonies/ml of Enterococcus faecalis RESISTANT to Ciprofloxacin, Levofloxacin, Tetracycline and Vancomycin    History of kidney stones     History of recurrent urinary tract infection     History of sepsis 6/18/2021    History of septic shock 10/8/2021    History of urethral stricture     History of urinary tract infection 10/8/2021    Urine culture (collected 10/8/2021, resulted 10/14/2021) yielded growth of >100,000 colonies/ml of Enterococcus faecalis RESISTANT to Ciprofloxacin, Levofloxacin, Tetracycline and Vancomycin    Hyperlipidemia     Hyperphosphatemia 11/14/2021    Hypothyroidism     Lung mass     Mononeuropathy     Involving ring finger of left hand    Need for prophylactic isolation 10/8/2021    Urine culture (collected 10/8/2021, resulted 10/14/2021) yielded growth of >100,000 colonies/ml of Enterococcus faecalis RESISTANT to Ciprofloxacin, Levofloxacin, Tetracycline and Vancomycin    Paroxysmal atrial fibrillation (HCC)     Secondary hyperparathyroidism of renal origin ([facility] [address]     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 ([facility] [address]     HbA1c (10/8/2021) = 4.6    Uric acid nephrolithiasis     Urinary incontinence      Past Surgical History:   Procedure Laterality Date    HX APPENDECTOMY      HX CHOLECYSTECTOMY      HX GASTRIC BYPASS      Gastric stapling    HX KNEE ARTHROSCOPY      HX UROLOGICAL      right PCN placement    HX UROLOGICAL  07/23/2018    RIGHT URETEROSCOPY WITH HOLMIUM LASER    IR EXCHANGE NEPHRO PERC LT SI  2/21/2020    IR EXCHANGE NEPHRO PERC RT SI  4/13/2020    IR EXCHANGE NEPHRO PERC RT SI  7/17/2020    IR NEPHROSTOMY PERC RT PLC CATH  SI  10/14/2020    IR NEPHROURETERAL PERC RT PLC CATH NEW ACCESS  SI  4/30/2020    [place] INTRO CATH DIALYSIS CIRCUIT DX ANGRPH FLUOR S&I Left 9/24/2020    FISTULOGRAM LEFT/poss permanent catheter placement performed by Aarti Babb MD at [facility] CATH LAB    VASCULAR SURGERY PROCEDURE UNLIST      lef AVF     Barriers to Learning/Limitations: None  Compensate with: visual, verbal, tactile, kinesthetic cues/model    Home Situation:   Home Situation  Home Environment: Private residence  # Steps to Enter: 4  Rails to Enter: Yes  Hand Rails : Bilateral  Wheelchair Ramp: No  One/Two Story Residence: Two story  Lift Chair Available: Yes  Living Alone: No  Support Systems: Child(isaura) (son and daughter in law)  Patient Expects to be Discharged to[de-identified] Home with family assistance  Current DME Used/Available at Home: Javed Curio, rolling,Shower chair  Tub or Shower Type: Shower  [x]     Right hand dominant   []     Left hand dominant    Cognitive/Behavioral Status:  Neurologic State: Alert  Orientation Level: Oriented X4  Cognition: Appropriate decision making  Safety/Judgement: Awareness of environment; Fall prevention    Skin: visible skin intact  Edema: min in distal RUE (cath site)    Vision/Perceptual:       Acuity: Able to read clock/calendar on wall without difficulty     Coordination: BUE  Coordination: Generally decreased, functional  Fine Motor Skills-Upper: Left Intact; Right Intact    Gross Motor Skills-Upper: Left Intact; Right Intact    Balance:  Sitting: Intact  Standing: Intact; With support    Strength: BUE  Strength: Generally decreased, functional   Tone & Sensation: BUE  Tone: Normal  Sensation: Intact   Range of Motion: BUE  AROM: Generally decreased, functional (RUE limited shoulder AROM)     Functional Mobility and Transfers for ADLs:  Bed Mobility:     Supine to Sit: Modified independent  Sit to Supine: Modified independent  Scooting: Modified independent  Transfers:  Sit to Stand: Supervision  Stand to Sit: Supervision   Toilet Transfer : Supervision    Bathroom Mobility: Supervision/set up    ADL Assessment:  Feeding: Modified independent  Oral Facial Hygiene/Grooming: Modified Independent  Bathing: Modified independent  Upper Body Dressing: Modified independent  Lower Body Dressing: Modified independent  Toileting: Modified independent   ADL Intervention:     Cognitive Retraining  Safety/Judgement: Awareness of environment; Fall prevention  Pain:  Pain level pre-treatment: 0/10   Pain level post-treatment: 0/10     Activity Tolerance:   Fair+  Please refer to the flowsheet for vital signs taken during this treatment. After treatment:   []  Patient left in no apparent distress sitting up in chair  [x]  Patient left in no apparent distress in bed  [x]  Call bell left within reach  [x]  Nursing notified  []  Caregiver present  []  Bed alarm activated    COMMUNICATION/EDUCATION:   [x]      Role of Occupational Therapy in the acute care setting  [x]      Home safety education was provided and the patient/caregiver indicated understanding. []      Patient/family have participated as able and agree with findings and recommendations. []      Patient is unable to participate in plan of care at this time. Thank you for this referral.  Anton Ruffin OTR/L  Time Calculation: 28 mins      Eval Complexity: History: LOW Complexity : Brief history review ; Examination: LOW Complexity : 1-3 performance deficits relating to physical, cognitive , or psychosocial skils that result in activity limitations and / or participation restrictions ;    Decision Making:LOW Complexity : No comorbidities that affect functional and no verbal or physical assistance needed to complete eval tasks

## 2022-05-26 NOTE — PROGRESS NOTES
New OT orders received and chart reviewed. Unable to see pt for OT evaluation at this time due to: Attempt x1- pt is walking in hallway with RN for walk test.  Attempt x2 and x3 Pt is off Unit (10:17 and 12:59)  Will follow up later as pt's schedule allows.  Thank you for this referral.    Albin Leach MS, OTR/L

## 2022-05-26 NOTE — PROGRESS NOTES
Problem: Mobility Impaired (Adult and Pediatric)  Goal: *Acute Goals and Plan of Care (Insert Text)  Outcome: Resolved/Met   PHYSICAL THERAPY EVALUATION AND DISCHARGE    Patient: Pro Dobbins (72 y.o. female)  Date: 5/26/2022  Primary Diagnosis: ESRD on dialysis ([facility] [address] [N18.6, Z99.2]  SOB (shortness of breath) on exertion [R06.02]  Procedure(s) (LRB):  LEFT AND RIGHT HEART CATH / CORONARY ANGIOGRAPHY (Right) 1 Day Post-Op   Precautions:  Contact    PLOF: Pt Reyna with RW, lives with son and daughter in law. Chair lift for second floor access. ASSESSMENT :  Based on the objective data described below, the patient presents with baseline functional mobility. Pt demonstrating Reyna bed mobility, supervision for standing to RW, ambulating x400 feet with supervision and 1 seated rest break. Pt ascending/descending 5 steps with B handrails and reciprocal gait pattern with supervision. Pt returned to room, performing oral care in standing with good balance. Returned to supine in bed. All needs met and  call bell in reach. Pt eager to go home. May benefit from outpatient therapy for endurance training. Patient does not require further skilled intervention at this level of care. PLAN :  Recommendations and Planned Interventions:   No formal PT needs identified at this time. Discharge Recommendations: Outpatient  Further Equipment Recommendations for Discharge: rolling walker     SUBJECTIVE:   Patient stated I passed? Lauren Garner    OBJECTIVE DATA SUMMARY:     Past Medical History:   Diagnosis Date    Anemia in end-stage renal disease ([facility] [address]     Anticoagulated by anticoagulation treatment     On Apixaban    Chronic hypotension     On Midodrine    Chronic pain     Closed fracture of left inferior pubic ramus, with routine healing, subsequent encounter 11/12/2021    Closed fracture of superior pubic ramus, left, with routine healing, subsequent encounter 11/12/2021    Closed nondisplaced fracture of anterior wall of left acetabulum with routine healing 11/12/2021    Depression     End-stage renal disease on hemodialysis ([address]     HD at [facility] on MWF.  Tel # [phone]    Gastroesophageal reflux disease     Glaucoma     History of acute pyelonephritis 2/20/2020    History of hydronephrosis 10/5/2021    History of infection with vancomycin resistant Enterococcus (VRE) 10/8/2021    Urine culture (collected 10/8/2021, resulted 10/14/2021) yielded growth of >100,000 colonies/ml of Enterococcus faecalis RESISTANT to Ciprofloxacin, Levofloxacin, Tetracycline and Vancomycin    History of kidney stones     History of recurrent urinary tract infection     History of sepsis 6/18/2021    History of septic shock 10/8/2021    History of urethral stricture     History of urinary tract infection 10/8/2021    Urine culture (collected 10/8/2021, resulted 10/14/2021) yielded growth of >100,000 colonies/ml of Enterococcus faecalis RESISTANT to Ciprofloxacin, Levofloxacin, Tetracycline and Vancomycin    Hyperlipidemia     Hyperphosphatemia 11/14/2021    Hypothyroidism     Lung mass     Mononeuropathy     Involving ring finger of left hand    Need for prophylactic isolation 10/8/2021    Urine culture (collected 10/8/2021, resulted 10/14/2021) yielded growth of >100,000 colonies/ml of Enterococcus faecalis RESISTANT to Ciprofloxacin, Levofloxacin, Tetracycline and Vancomycin    Paroxysmal atrial fibrillation (HCC)     Secondary hyperparathyroidism of renal origin ([facility] [address]     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 ([facility] [address]     HbA1c (10/8/2021) = 4.6    Uric acid nephrolithiasis     Urinary incontinence      Past Surgical History:   Procedure Laterality Date    HX APPENDECTOMY      HX CHOLECYSTECTOMY      HX GASTRIC BYPASS      Gastric stapling    HX KNEE ARTHROSCOPY      HX UROLOGICAL      right PCN placement    HX UROLOGICAL  07/23/2018    RIGHT URETEROSCOPY WITH HOLMIUM LASER    IR EXCHANGE NEPHRO PERC LT SI  2/21/2020    IR EXCHANGE NEPHRO PERC RT SI  4/13/2020    IR EXCHANGE NEPHRO PERC RT SI  7/17/2020    IR NEPHROSTOMY PERC RT PLC CATH  SI  10/14/2020    IR NEPHROURETERAL PERC RT PLC CATH NEW ACCESS  SI  4/30/2020    NV INTRO CATH DIALYSIS CIRCUIT DX ANGRPH FLUOR S&I Left 9/24/2020    FISTULOGRAM LEFT/poss permanent catheter placement performed by Pio Diaz MD at [facility] CATH LAB    VASCULAR SURGERY PROCEDURE UNLIST      lef AVF     Barriers to Learning/Limitations: None  Compensate with: N/A  Home Situation:   Home Situation  Home Environment: Private residence  # Steps to Enter: 4  Rails to Enter: Yes  Hand Rails : Bilateral  Wheelchair Ramp: No  One/Two Story Residence: Two story  Lift Chair Available: Yes  Living Alone: No  Support Systems: Other Family Member(s),Child(isaura)  Patient Expects to be Discharged to[de-identified] Home  Current DME Used/Available at Home: Walker, rolling  Critical Behavior:  Neurologic State: Alert  Orientation Level: Oriented X4  Cognition: Appropriate decision making  Safety/Judgement: Awareness of environment; Fall prevention  Psychosocial  Patient Behaviors: Calm; Cooperative  Purposeful Interaction: Yes  Pt Identified Daily Priority: Clinical issues (comment)  Caritas Process: Establish trust;Teaching/learning; Attend basic human needs  Caring Interventions: Reassure; Therapeutic modalities  Reassure: Therapeutic listening;Caring rounds  Therapeutic Modalities: Intentional therapeutic touch    Strength:    Strength: Generally decreased, functional    Tone & Sensation:   Tone: Normal    Sensation: Intact    Range Of Motion:  AROM: Within functional limits    Posture:  Posture (WDL): Within defined limits      Functional Mobility:  Bed Mobility:     Supine to Sit: Modified independent  Sit to Supine: Modified independent  Scooting: Modified independent  Transfers:  Sit to Stand: Supervision  Stand to Sit: Supervision    Balance:   Sitting: Intact  Standing: Intact; With support    Ambulation/Gait Training:  Distance (ft): 400 Feet (ft)  Assistive Device: Walker, rolling  Ambulation - Level of Assistance: Supervision     Gait Description (WDL): Exceptions to WDL    Stairs:  Number of Stairs Trained: 5  Stairs - Level of Assistance: Supervision  Rail Use: Both       Pain:  Pain level pre-treatment: 0/10   Pain level post-treatment: 0/10    Activity Tolerance:   Good   Please refer to the flowsheet for vital signs taken during this treatment. After treatment:   []         Patient left in no apparent distress sitting up in chair  [x]         Patient left in no apparent distress in bed  [x]         Call bell left within reach  [x]         Nursing notified  []         Caregiver present  []         Bed alarm activated  []         SCDs applied    COMMUNICATION/EDUCATION:   [x]         Role of Physical Therapy in the acute care setting. [x]         Fall prevention education was provided and the patient/caregiver indicated understanding. [x]         Patient/family have participated as able in goal setting and plan of care. [x]         Patient/family agree to work toward stated goals and plan of care. []         Patient understands intent and goals of therapy, but is neutral about his/her participation. []         Patient is unable to participate in goal setting/plan of care: ongoing with therapy staff.  []         Other:     Thank you for this referral.  Kendy De Guzman, PT   Time Calculation: 31 mins      Eval Complexity: History: MEDIUM  Complexity : 1-2 comorbidities / personal factors will impact the outcome/ POC Exam:LOW Complexity : 1-2 Standardized tests and measures addressing body structure, function, activity limitation and / or participation in recreation  Presentation: LOW Complexity : Stable, uncomplicated  Clinical Decision Making:Low Complexity low  Overall Complexity:LOW

## 2022-05-26 NOTE — PROGRESS NOTES
[facility]  Intern Progress Note    Patient: Garcia Black MRN: [id number]   SSN: [id number]  YOB: 1945   Age: 64 y.o. Sex: female      Admit Date: 5/25/2022    LOS: 1 day   Chief Complaint   Patient presents with    Shortness of Breath    Fatigue       Subjective:   Patient was seen at bedside this morning and reported foul odor in urine. ROS    Constitutional:. Negative for chills and fever. Respiratory: negative for SOB . Negative for cough. Cardiovascular:  Negative for leg swelling. Gastrointestinal: Negative for nausea and vomiting. Skin: Negative for rash. Neurological: Negative for dizziness.  - reports foul odor in urine without pain      Objective:     Visit Vitals  /79 (BP 1 Location: Left leg, BP Patient Position: At rest)   Pulse 73   Temp 97.6 °F (36.4 °C)   Resp 18   Ht 5' 2\" (1.575 m)   Wt 90.7 kg (200 lb)   SpO2 100%   BMI 36.58 kg/m²     Physical Exam:   General:  AAOx3, NAD   HEENT: Conjunctiva pink, sclera anicteric. EOMI. Pharynx moist, nonerythematous. Moist mucous membranes. CV:  RRR, no M/G/R. No visible pulsations or thrills. RESP:  Unlabored breathing. Lungs CTAB, no wheezes, rales or rhonchi appreciated. Equal expansion bilaterally. ABD:  Soft, nontender, nondistended. No suprapubic tenderness. MS:  No joint deformity or instability. No atrophy. Neuro:  CN II-XII grossly intact. 5/5 strength bilateral lower extremities. Upper extremity exam limited due to patient receiving dialysis.  strength intact bilaterally. Ext:  No edema. Skin:  No rashes, lesions, or ulcers. Good turgor. Psych: normal mood and affect     Intake and Output:  Current Shift: No intake/output data recorded.   Last three shifts: 05/24 1901 - 05/26 0700  In: 180 [P.O.:180]  Out: 1000     Lab/Data Review:  Recent Results (from the past 12 hour(s))   METABOLIC PANEL, COMPREHENSIVE    Collection Time: 05/26/22  4:31 AM   Result Value Ref Range Sodium 141 136 - 145 mmol/L    Potassium 4.3 3.5 - 5.5 mmol/L    Chloride 103 100 - 111 mmol/L    CO2 33 (H) 21 - 32 mmol/L    Anion gap 5 3.0 - 18 mmol/L    Glucose 97 74 - 99 mg/dL    BUN 12 7.0 - 18 MG/DL    Creatinine 4.04 (H) 0.6 - 1.3 MG/DL    BUN/Creatinine ratio 3 (L) 12 - 20      GFR est AA 13 (L) >60 ml/min/1.73m2    GFR est non-AA 11 (L) >60 ml/min/1.73m2    Calcium 9.2 8.5 - 10.1 MG/DL    Bilirubin, total 0.4 0.2 - 1.0 MG/DL    ALT (SGPT) 13 13 - 56 U/L    AST (SGOT) 14 10 - 38 U/L    Alk. phosphatase 88 45 - 117 U/L    Protein, total 6.5 6.4 - 8.2 g/dL    Albumin 2.9 (L) 3.4 - 5.0 g/dL    Globulin 3.6 2.0 - 4.0 g/dL    A-G Ratio 0.8 0.8 - 1.7     MAGNESIUM    Collection Time: 05/26/22  4:31 AM   Result Value Ref Range    Magnesium 2.3 1.6 - 2.6 mg/dL   CBC WITH AUTOMATED DIFF    Collection Time: 05/26/22  4:31 AM   Result Value Ref Range    WBC 3.9 (L) 4.6 - 13.2 K/uL    RBC 2.67 (L) 4.20 - 5.30 M/uL    HGB 8.9 (L) 12.0 - 16.0 g/dL    HCT 27.8 (L) 35.0 - 45.0 %    .1 (H) 78.0 - 100.0 FL    MCH 33.3 24.0 - 34.0 PG    MCHC 32.0 31.0 - 37.0 g/dL    RDW 14.6 (H) 11.6 - 14.5 %    PLATELET 755 336 - 743 K/uL    MPV 9.8 9.2 - 11.8 FL    NRBC 0.0 0  WBC    ABSOLUTE NRBC 0.00 0.00 - 0.01 K/uL    NEUTROPHILS 48 40 - 73 %    LYMPHOCYTES 36 21 - 52 %    MONOCYTES 12 (H) 3 - 10 %    EOSINOPHILS 3 0 - 5 %    BASOPHILS 1 0 - 2 %    IMMATURE GRANULOCYTES 1 (H) 0.0 - 0.5 %    ABS. NEUTROPHILS 1.9 1.8 - 8.0 K/UL    ABS. LYMPHOCYTES 1.4 0.9 - 3.6 K/UL    ABS. MONOCYTES 0.5 0.05 - 1.2 K/UL    ABS. EOSINOPHILS 0.1 0.0 - 0.4 K/UL    ABS. BASOPHILS 0.0 0.0 - 0.1 K/UL    ABS. IMM.  GRANS. 0.0 0.00 - 0.04 K/UL    DF AUTOMATED       Assessment and Plan:   64 y.o. female with PMH of paroxysmal afib, ESRD on HD (MWF), HLD,  L hip fx (Nov 21), hypothyroidism, chronic hypotension, DMII (diet controlled) with neuropathy, depression, glaucoma, recurrent UTI's with hx of stones/R ureteral stent, gout, and chronic low back pain, now admitted with unstable angina pending heart cath.     Dyspnea on exertion, weakness  First occurred only at rest, but now exacerbated with minimal activity such as walking around home. Scheduled for outpatient cath procedure on 5/29, however symptoms worsen prior to this time, so patient presented at the ED. Initial concern for cardiac etiology and pt taken for cath. Follows with  outpt. -ECG 5/25/22 and troponins wnl on admission. -CXR 5/25/22 - wnl. Heart cath findings 5/25: no obstructive CAD. RHC  With mildly elevated filling pressures and mild pulmonary HTN, normal CO. ECHO 5/25: Normal left ventricular systolic function with a visually estimated EF of 55 - 60%. Left ventricle size is normal. Normal wall thickness. Normal wall motion. Per Dr. Oskar Gregory, cardiology no clear cardiac etiology for patient's significant dyspnea on exertion and weakness. Will investigate other etiologies including worsening of chronic anemia, pulmonary source or endocrine disruptions. Plan:  -continue Asa 325mg  - cards on board, appreciate recs  - consulted pulmonology 5/26  - Orthostatic hypotension testing  - TSH [place]  - will attempt DuoNeb prior to ambulation to evaluate effect on WU  - daily CBC, CMP, Mg, Phos     Paroxysmal afib  Home meds: amiodarone 200mg, Eliquis 5mg bid  (last took [facility] on 5/24 am)  Plan:              -amiodarone resumed              -resume home Eliquis am of 5/26 per cards recs              - needs outpt [facility] prior to watchman device, to be scheduled by    's office     ESRD on HD (MWF)  Nephrology consulted in the ED. Dr. Toney Look would like to do additional dialysis 5/26/22   Plan:              - FU nephro recs     Chronic hypotension  - Continue Midodrine 10mg tid.     Hypothyroidism  TSH 1.75 in March 2021. Plan:              -cont home levothyroxine 125mcg     DM II with neuropathy  HbA1c 4.9% on 5/19/22.  Diet controlled  Plan:              -Gabapentin 200mg tid     Depression  Continue Duloxetine 20mg.     Gout  Continue Allopurinol 100mg MWF.     Osteopenia w/ hx of closed L pelvic fx (Nov 2021)  Pt was admitted to [facility] in Nov 2021 for multiple closed PV fx's that did not require surgery, but pt went to ARU  Dexa scan with t score -1.5 in March 2022.  Vit D     Global Care:  - VS per unit routine  - supplemental O2 for sats < 92%  - incentive spirometer  - PT/OT/CM     Diet  nephro diet   DVT Prophylaxis  Eliquis   GI Prophylaxis  Pantoprazole    Code status  DNR   Disposition  home w/ Silvino Proctor MD, PGY-1   [address]   Intern Pager: [phone]   May 26, 2022, 8:12 AM

## 2022-05-26 NOTE — PROGRESS NOTES
POST ROUNDING NOTE     -will follow up on Pulmonology recs  -patient did NOT feel better after receiving duo-neb treatment.   -will call PT and ask to see patient again today.  ( they saw her twice but the first time she was getting the walk test, second time she was in dialysis)   -continue to follow PT/OT Jinny Rivers MD PGY1

## 2022-05-26 NOTE — CONSULTS
[facility]. Pulmonary, Critical Care, and Sleep Medicine    Initial Patient Consult    Name: Nicol Taveras MRN: [id number]   : 1945 Hospital: [address] [place] [address]   Date: 2022        IMPRESSION:   · Dyspnea on exertion  · - likely multifactorial- deconditioning, anemia, mild pHTN, possible SAE and restrictive lung disease  · - not hypoxic, no smoking history  · - LHC grossly normal, normal EF  · - normal CXR  · ESRD  · - HD TTS  · - on epo  · Afib w/RVR  · - on Eliquis, planned watchman procedure  · Mild pHTN  · - recent RHC- PA 27 with wedge 23- indicating post-capillary  · - likely 2/2 ESRD  · DM  · Chronic hypotension  · - on midodrine      Patient Active Problem List   Diagnosis Code    History of acute pyelonephritis Z87.440    History of infection with vancomycin resistant Enterococcus (VRE) Z86.19    Anemia in end-stage renal disease ([facility]) N18.6, D63.1    Chronic hypotension I95.89    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 ([facility]) E11.22, N18.6    Secondary hyperparathyroidism of renal origin ([facility] N25.81    Gastroesophageal reflux disease K21.9    History of recurrent urinary tract infection Z87.440    History of sepsis Z86.19    End-stage renal disease on hemodialysis ([facility] N18.6, Z99.2    History of hydronephrosis Z87.448    History of septic shock Z86.19    Anticoagulated by anticoagulation treatment Z79.01    Paroxysmal atrial fibrillation ([facility]) I48.0    Closed fracture of left inferior pubic ramus, with routine healing, subsequent encounter S32.592D    Closed nondisplaced fracture of anterior wall of left acetabulum with routine healing S32.415D    Closed fracture of superior pubic ramus, left, with routine healing, subsequent encounter S32.512D    Multiple closed pelvic fractures without disruption of pelvic ring ([facility] [address] S32.82XA    Depression F32. A    History of urinary tract infection Z87.440    Need for prophylactic isolation Z41.8  Hyperlipidemia E78.5    Hypothyroidism E03.9    History of kidney stones Z87.442    Chronic pain G89.29    Lung mass R91.8    History of urethral stricture Z87.448    Uric acid nephrolithiasis N20.0    Urinary incontinence R32    Glaucoma H40.9    Hyperphosphatemia E83.39    Mononeuropathy G58.9    Hyperkalemia E87.5    Gross hematuria R31.0    Impaired mobility and ADLs Z74.09, Z78.9    Stricture of female urethra N35.92    ESRD on dialysis (HCC) N18.6, Z99.2    SOB (shortness of breath) on exertion R06.02      RECOMMENDATIONS:   · No further inpatient work up indicated  ·  Will f/u in clinic for PFTs, sleep study. May need high-resolution CT chest to r/o early ILD. Pt is already on [facility] so PE is unlikely and a CTA would not . Echo was not concerning. · If negative, dyspnea is likely 2/2 deconditioning or possibly postural hypotension. She may benefit from cardiopulmonary rehab or intense PT/OT program.   · Okay to discharge from my standpoint. Subjective: This patient has been seen and evaluated at the request of Dr. Juan A Alarcon for dyspnea. Patient is a 64 y.o. female with PMHx of ESRD on HD, Afib on Eliquis, Chronic hypotension on midodrine, hypothyroidism. who presents to the ED with shortness of breath and chest pressure. She states it has been getting worse over the last several weeks and is mostly on exertion. Sometimes she feels very weak and light headed. She was evaluated by cardiology and LHC and RHC was performed- which only showed mild CAD and pHTN. Her CXR was normal as well as echocardiogram. She has not required supplemental O2. She does report a smoking history. Denies cough, congestion, recent illness. Endorses 65lbs weight loss in the last year. Labs indicated anemia, TSH was WNL. On my assessment, she appears comfortable on RA. She states her dyspnea is not much improved since she presented.        Past Medical History:   Diagnosis Date    Anemia in end-stage renal disease ([facility]     Anticoagulated by anticoagulation treatment     On Apixaban    Chronic hypotension     On Midodrine    Chronic pain     Closed fracture of left inferior pubic ramus, with routine healing, subsequent encounter 11/12/2021    Closed fracture of superior pubic ramus, left, with routine healing, subsequent encounter 11/12/2021    Closed nondisplaced fracture of anterior wall of left acetabulum with routine healing 11/12/2021    Depression     End-stage renal disease on hemodialysis ([facility] [address]     HD at [facility] on [facility].  Tel # [phone]    Gastroesophageal reflux disease     Glaucoma     History of acute pyelonephritis 2/20/2020    History of hydronephrosis 10/5/2021    History of infection with vancomycin resistant Enterococcus (VRE) 10/8/2021    Urine culture (collected 10/8/2021, resulted 10/14/2021) yielded growth of >100,000 colonies/ml of Enterococcus faecalis RESISTANT to Ciprofloxacin, Levofloxacin, Tetracycline and Vancomycin    History of kidney stones     History of recurrent urinary tract infection     History of sepsis 6/18/2021    History of septic shock 10/8/2021    History of urethral stricture     History of urinary tract infection 10/8/2021    Urine culture (collected 10/8/2021, resulted 10/14/2021) yielded growth of >100,000 colonies/ml of Enterococcus faecalis RESISTANT to Ciprofloxacin, Levofloxacin, Tetracycline and Vancomycin    Hyperlipidemia     Hyperphosphatemia 11/14/2021    Hypothyroidism     Lung mass     Mononeuropathy     Involving ring finger of left hand    Need for prophylactic isolation 10/8/2021    Urine culture (collected 10/8/2021, resulted 10/14/2021) yielded growth of >100,000 colonies/ml of Enterococcus faecalis RESISTANT to Ciprofloxacin, Levofloxacin, Tetracycline and Vancomycin    Paroxysmal atrial fibrillation (HCC)     Secondary hyperparathyroidism of renal origin ([facility] [facility]     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 ([facility] [facility]     HbA1c (10/8/2021) = 4.6    Uric acid nephrolithiasis     Urinary incontinence       Past Surgical History:   Procedure Laterality Date    HX APPENDECTOMY      HX CHOLECYSTECTOMY      HX GASTRIC BYPASS      Gastric stapling    HX KNEE ARTHROSCOPY      HX UROLOGICAL      right PCN placement    HX UROLOGICAL  07/23/2018    RIGHT URETEROSCOPY WITH HOLMIUM LASER    IR EXCHANGE NEPHRO PERC LT SI  2/21/2020    IR EXCHANGE NEPHRO PERC RT SI  4/13/2020    IR EXCHANGE NEPHRO PERC RT SI  7/17/2020    IR NEPHROSTOMY PERC RT PLC CATH  SI  10/14/2020    IR NEPHROURETERAL PERC RT PLC CATH NEW ACCESS  SI  4/30/2020    WA INTRO CATH DIALYSIS CIRCUIT DX ANGRPH FLUOR S&I Left 9/24/2020    FISTULOGRAM LEFT/poss permanent catheter placement performed by Makenzie Em MD at [facility] CATH LAB   [address] [facility]      Prior to Admission medications    Medication Sig Start Date End Date Taking? Authorizing Provider   0.9% sodium chloride solp 100 mL with iron sucrose 20 MG/ML 50 mg. Patient not taking: Reported on 4/12/2022 11/17/21 11/16/22  Provider, Historical   doxercalciferol (HECTOROL IV) 5 mcg. 9/29/21 9/28/22  Provider, Historical   fludrocortisone (FLORINEF) 0.1 mg tablet Take 1 Tablet by mouth. Patient not taking: Reported on 4/12/2022 10/27/21   Provider, Historical   midodrine (PROAMATINE) 10 mg tablet Take 10 mg by mouth three (3) times daily. 2/23/22   Provider, Historical   ondansetron hcl (ZOFRAN) 4 mg tablet  3/2/22   Provider, Historical   melatonin 5 mg tablet Take 5 mg by mouth nightly. Provider, Historical   HYDROmorphone (DILAUDID) 2 mg tablet Take 1 mg by mouth every eight (8) hours as needed for Pain. Take 1/2 tablet by mouth every 8 hours as needed for pain level of 5 out of 10 or greater    Provider, Historical   allopurinoL (ZYLOPRIM) 100 mg tablet Take 1 Tablet by mouth every Monday, Wednesday, Friday.  [after hemodialysis] Indications: treatment to prevent acute gout attack 12/3/21   Lisset Contreras MD   amiodarone (CORDARONE) 200 mg tablet Take 1 Tablet by mouth daily. Indications: prevention of recurrent atrial fibrillation 12/3/21   Lisset Contreras MD   sevelamer carbonate (RENVELA) 800 mg tab tab Take 2 Tablets by mouth three (3) times daily (with meals). Indications: renal osteodystrophy with hyperphosphatemia 12/3/21   Lisset Contreras MD   sodium zirconium cyclosilicate [facility]) 5 gram powder packet Take 1 Packet by mouth daily. Indications: high levels of potassium in the blood 12/3/21   Lisset Contreras MD   acetaminophen (Tylenol Extra Strength) 500 mg tablet Take 1 Tablet by mouth every six (6) hours as needed for Pain. 11/7/21   Rafi Murray MD   lidocaine (LIDODERM) 5 % Apply patch to the affected area for 12 hours a day and remove for 12 hours a day. 11/7/21   Shamar Carter MD   gabapentin (NEURONTIN) 100 mg capsule Take 2 Capsules by mouth Three (3) times a week. Max Daily Amount: 200 mg. Take 2 capsules by mouth 3 times a week as needed for pain post dialysis. Indications: concern for back pain post dialysis  Patient taking differently: Take 100 mg by mouth daily. taking 1 pill at night before bed  Indications: concern for back pain post dialysis 10/15/21   Christiano Beltrán MD   apixaban (ELIQUIS) 5 mg tablet Take 1 Tablet by mouth two (2) times a day. 10/14/21   Karolina Guillen MD   meclizine (ANTIVERT) 25 mg tablet Take 25 mg by mouth three (3) times daily as needed for Dizziness. 3/3/21   Provider, Historical   methoxy peg-epoetin beta [facility] INJECTION) 30 mcg. 9/20/21 9/19/22  Provider, Historical   DULoxetine (Cymbalta) 20 mg capsule Take 20 mg by mouth daily. Provider, Historical   estradioL (Estrace) 0.01 % (0.1 mg/gram) vaginal cream Apply a fingertip amount around the urethra three times a week. 9/30/20   Ulices Guerin MD   biotin 1,000 mcg chew Take 1 Tab by mouth daily.     Provider, Historical   cyanocobalamin 1,000 mcg tablet Take 1,000 mcg by mouth daily. Provider, Historical   lactobacillus sp. 50 billion cpu (BIO-K PLUS) 50 billion cell -375 mg cap capsule Take 1 Cap by mouth daily. Patient not taking: Reported on 4/12/2022 2/25/20   Danny Pardo MD   ascorbic acid, vitamin C, (VITAMIN C) 500 mg tablet Take 500 mg by mouth daily. Lexus Hogan MD   calcitRIOL (ROCALTROL) 0.25 mcg capsule Take 0.25 mcg by mouth daily. Lexus Hogan MD   cholecalciferol (VITAMIN D3) (2,000 UNITS /50 MCG) cap capsule Take 2,000 Units by mouth two (2) times a day. Take two tabs a total of 4000 units    Lexus Hogan MD   latanoprost (XALATAN) 0.005 % ophthalmic solution Administer 1 Drop to both eyes nightly. One drop at bedtime    Lexus Hogan MD   levothyroxine (SYNTHROID) 125 mcg tablet Take 125 mcg by mouth Daily (before breakfast). Lexus Hogan MD   omeprazole (PRILOSEC) 20 mg capsule Take 20 mg by mouth daily. Lexus Hogan MD   ondansetron (ZOFRAN ODT) 4 mg disintegrating tablet Take 4 mg by mouth every eight (8) hours as needed for Nausea or Vomiting. Lexus Hogan MD   vit B Cmplx 3-FA-Vit C-Biotin (NEPHRO HOWIE RX) 1- mg-mg-mcg tablet Take 1 Tab by mouth daily.     Lexus Hogan MD     Allergies   Allergen Reactions    Albumin, Human 25 % Itching     Headache - severe migraine like, itchy eyes, runny nose    Ciprofloxacin Hives    Cyclopentolate Unknown (comments)    Iron Sucrose Diarrhea    Statins-Hmg-Coa Reductase Inhibitors Other (comments)     Body ache      Social History     Tobacco Use    Smoking status: Never Smoker    Smokeless tobacco: Never Used   Substance Use Topics    Alcohol use: Never      Family History   Problem Relation Age of Onset    Heart Surgery Sister         Current Facility-Administered Medications   Medication Dose Route Frequency    sodium chloride (NS) flush 5-40 mL  5-40 mL IntraVENous Q8H    sodium chloride (NS) flush 5-40 mL  5-40 mL IntraVENous Q8H    allopurinoL (ZYLOPRIM) tablet 100 mg  100 mg Oral Q MON, WED & FRI    amiodarone (CORDARONE) tablet 200 mg  200 mg Oral DAILY    calcitRIOL (ROCALTROL) capsule 0.25 mcg  0.25 mcg Oral DAILY    DULoxetine (CYMBALTA) capsule 20 mg  20 mg Oral DAILY    [Held by provider] fludrocortisone (FLORINEF) tablet 0.1 mg  0.1 mg Oral DAILY    gabapentin (NEURONTIN) capsule 100 mg  100 mg Oral QHS    latanoprost (XALATAN) 0.005 % ophthalmic solution 1 Drop  1 Drop Both Eyes QHS    levothyroxine (SYNTHROID) tablet 125 mcg  125 mcg Oral ACB    lidocaine 4 % patch 1 Patch  1 Patch TransDERmal DAILY    melatonin tablet 5 mg  5 mg Oral QHS    midodrine (PROAMATINE) tablet 10 mg  10 mg Oral TID WITH MEALS    pantoprazole (PROTONIX) tablet 40 mg  40 mg Oral DAILY    sevelamer carbonate (RENVELA) tab 1,600 mg  1,600 mg Oral TID WITH MEALS    apixaban (ELIQUIS) tablet 5 mg  5 mg Oral BID       Review of Systems:  Pertinent items are noted in HPI. ROS    Objective:   Vital Signs:    Visit Vitals  BP (!) 110/58 (BP 1 Location: Left leg, BP Patient Position: At rest)   Pulse 71   Temp 98.3 °F (36.8 °C)   Resp 20   Ht 5' 2\" (1.575 m)   Wt 90.7 kg (200 lb)   SpO2 95%   BMI 36.58 kg/m²       O2 Device: None (Room air)       Temp (24hrs), Av.9 °F (36.6 °C), Min:97 °F (36.1 °C), Max:98.3 °F (36.8 °C)       Intake/Output:   Last shift:      701 - 1900  In: -   Out: 1000   Last 3 shifts: 1901 -  07  In: 180 [P.O.:180]  Out: 1000     Intake/Output Summary (Last 24 hours) at 2022 1608  Last data filed at 2022 1357  Gross per 24 hour   Intake 180 ml   Output 2000 ml   Net -1820 ml      Physical Exam:   General:  Alert, cooperative, no distress, appears stated age. Head:  Normocephalic, without obvious abnormality, atraumatic. Eyes:  Conjunctivae/corneas clear. ANicteric   Lungs:   Bilateral auscultation clear, no wheezing or rales. Chest wall:  No tenderness or deformity. NO CREPITUS   Heart:  Regular rate and rhythm, S1, S2 normal, no murmur, click, rub or gallop. Abdomen:   Soft, non-tender. Bowel sounds normal. No masses,  No organomegaly. No paradox   Extremities: normal, atraumatic, no cyanosis or edema. Pulses: 1-2+ and symmetric all extremities. Neurologic: Grossly nonfocal          Data review:   Labs:  Recent Results (from the past 24 hour(s))   METABOLIC PANEL, COMPREHENSIVE    Collection Time: 05/26/22  4:31 AM   Result Value Ref Range    Sodium 141 136 - 145 mmol/L    Potassium 4.3 3.5 - 5.5 mmol/L    Chloride 103 100 - 111 mmol/L    CO2 33 (H) 21 - 32 mmol/L    Anion gap 5 3.0 - 18 mmol/L    Glucose 97 74 - 99 mg/dL    BUN 12 7.0 - 18 MG/DL    Creatinine 4.04 (H) 0.6 - 1.3 MG/DL    BUN/Creatinine ratio 3 (L) 12 - 20      GFR est AA 13 (L) >60 ml/min/1.73m2    GFR est non-AA 11 (L) >60 ml/min/1.73m2    Calcium 9.2 8.5 - 10.1 MG/DL    Bilirubin, total 0.4 0.2 - 1.0 MG/DL    ALT (SGPT) 13 13 - 56 U/L    AST (SGOT) 14 10 - 38 U/L    Alk. phosphatase 88 45 - 117 U/L    Protein, total 6.5 6.4 - 8.2 g/dL    Albumin 2.9 (L) 3.4 - 5.0 g/dL    Globulin 3.6 2.0 - 4.0 g/dL    A-G Ratio 0.8 0.8 - 1.7     MAGNESIUM    Collection Time: 05/26/22  4:31 AM   Result Value Ref Range    Magnesium 2.3 1.6 - 2.6 mg/dL   CBC WITH AUTOMATED DIFF    Collection Time: 05/26/22  4:31 AM   Result Value Ref Range    WBC 3.9 (L) 4.6 - 13.2 K/uL    RBC 2.67 (L) 4.20 - 5.30 M/uL    HGB 8.9 (L) 12.0 - 16.0 g/dL    HCT 27.8 (L) 35.0 - 45.0 %    .1 (H) 78.0 - 100.0 FL    MCH 33.3 24.0 - 34.0 PG    MCHC 32.0 31.0 - 37.0 g/dL    RDW 14.6 (H) 11.6 - 14.5 %    PLATELET 306 296 - 488 K/uL    MPV 9.8 9.2 - 11.8 FL    NRBC 0.0 0  WBC    ABSOLUTE NRBC 0.00 0.00 - 0.01 K/uL    NEUTROPHILS 48 40 - 73 %    LYMPHOCYTES 36 21 - 52 %    MONOCYTES 12 (H) 3 - 10 %    EOSINOPHILS 3 0 - 5 %    BASOPHILS 1 0 - 2 %    IMMATURE GRANULOCYTES 1 (H) 0.0 - 0.5 %    ABS. NEUTROPHILS 1.9 1.8 - 8.0 K/UL    ABS. LYMPHOCYTES 1.4 0.9 - 3.6 K/UL    ABS. MONOCYTES 0.5 0.05 - 1.2 K/UL    ABS. EOSINOPHILS 0.1 0.0 - 0.4 K/UL    ABS. BASOPHILS 0.0 0.0 - 0.1 K/UL    ABS. IMM. GRANS. 0.0 0.00 - 0.04 K/UL    DF AUTOMATED       Imaging:  I have personally reviewed the patients radiographs and have reviewed the reports:  CXR Results  (Last 48 hours)               05/25/22 0942  XR CHEST PORT Final result    Impression:   IMPRESSION:       1. No acute cardiopulmonary process. Narrative:  HISTORY: Exertional dyspnea. EXAM: Chest.       TECHNIQUE: Single view portable upright chest.        COMPARISON: No prior studies for comparison. FINDINGS: There is no pneumothorax, pneumonia or pleural effusions. Right   diaphragmatic eventration. Heart and mediastinal structures are unremarkable. .   Visualized bony thorax and soft tissues are within normal limits. CT Results  (Last 48 hours)    None            High complexity decision making was performed during the evaluation of this patient at high risk for decompensation with multiple organ involvement     Above mentioned total time spent on reviewing the case/medical record/data/notes/EMR/patient examination/documentation/coordinating care with nurse/consultants, exclusive of procedures with complex decision making performed and > 50% time spent in face to face evaluation.      Alex Hanson MD

## 2022-05-26 NOTE — PROGRESS NOTES
Received pre HD report from Muriel Rosario RN. Patient delayed to suite due to receiving a breathing treatment at the time transport arrived for pickup. Pt in bed, A+O x4, no s/s of distress noted. Bruising present at access site, pt denies any pain. Accessed Left Upper arm AVF w/15G needle per protocol. Tx initiated at 1022. AVF flowing with ease. For hemodynamic stability UF goal 2500 ml. Offered assistance with repositioning every 2 hours. Vascular access visible at all times during treatment, line connections intact at all times. @9833 pt b/p dropped, pt denies any s/s of distress. UF was temporarily turned off and pt received 100cc NS bolus. UF goal lowered. @1200 pt bent arm to answer her phone causing an infiltration at venous site. New 15G needle placed distally and treatment resumed. Ice applied to infiltrated site. Tx completed at 1357, tolerated well 1L removed. De-accessed per protocol. Clot time 5 minutes for arterial, and 5 minutes for venous. Unit nurse given report.                 ACUTE HEMODIALYSIS FLOW SHEET    HEMODIALYSIS ORDERS: Physician: Dr. Honey Rangel: aclear   Duration: 3.5 hr   BFR: 350   DFR: 600   Dialysate:  Temp 36-37*C   K+  2    Ca+ 2.5   Na 138   Bicarb 35   Wt Readings from Last 1 Encounters:   05/25/22 90.7 kg (200 lb)    Patient Chart [x]   Unable to Obtain []  Dry weight/UF Goal: 2500 ml    Heparin []  Bolus    Units    [] Hourly    Units    [x]None       Pre BP: 129/47  Pulse: 78  Respirations: 20 Temp: 98.0  [x] Oral  [] Ax  [] Esoph   Labs: []  Pre  []  Post:   [x] N/A   Additional Orders (medications, blood products, hypotension management): [] Yes   [] No     [x]  [facility] Consent Verified     CATHETER ACCESS:  [x]N/A   []Right   []Left   []IJ   []Fem  []Chest wall  []TransHepatic   [] First use X-ray verified     []Tunnel    [] Non Tunneled   []No S/S infection  []Redness  []Drainage []Cultured []Swelling []Pain   []Medical Aseptic Prep Utilized   []Dressing Changed  [] Biopatch  Date:    []Clotted   []Patent   Flows: []Good  []Poor  []Reversed   If access problem,  notified: []Yes    []N/A        GRAFT/FISTULA ACCESS:   []N/A     []Right     [x]Left     [x]UE     []LE   []AVG   [x]AVF       [x]Medical Aseptic Prep Utilized   [x]No S/S infection  []Redness  []Drainage [] Cultured  [] Swelling  [] Pain  Bruit:   [x] Strong    [] Weak       Thrill :   [x] Strong    [] Weak     Needle Gauge: 15   Length: 1 inch   If access problem,  notified: []Yes     [x]N/A          GENERAL ASSESSMENT:    LUNGS:  Resp Rate 78   [] Clear  [] Coarse  [] Crackles  [] Wheezing  [] Diminished                                                           [] RLL   [] LLL  [] RUL   [] LALI            Respirations:  [x]Easy  []Labored  []N/A  Cough:  []Productive  []Dry  []N/A               Therapy:  [x]RA   [] Ventilated   [] Intubated   [] Trach            O2 Device:  [] NC   [] NRB  [] Trach Mask  [] BiPaP  Flow:   l/min                                                    CARDIAC: [x] Regular      [] Irregular   [] Rhythm:          [] Monitored   [] Bedside   [] Remotely monitored       EDEMA: [x] None   []Generalized  [] Pitting [] 1+   [] 2 +   [] 3+    [] 4+        SKIN:   [] Hot     [] Cold    [x] Warm   [] Dry    [] Diaphoretic                 [] Flushed  [] Jaundiced  [] Cyanotic  [] Pale      LOC:    [x] Alert      [x]Oriented:    [x] Person     [x] Place   [x]Time               [] Confused  [] Lethargic  [] Medicated  [] Non-responsive  [] Non-Verbal     GI / ABDOMEN:                     [] Flat    [] Distended    [] Soft    [] Firm   []  Obese                   [] Diarrhea   [] FMS [] Bowel Sounds  [] Nausea  [] Vomiting                   [] NGT  [] OGT  [] PEG  [] Tube Feedings @     mL/hr     / URINE ASSESSMENT:                   [] Voiding    [] Oliguria  [] Anuria                     []  Cline   [] Incontinent  []  Incontinent Brief   []  PureWick     PAIN: [x] 0 []1  []2   []3   []4   []5   []6   []7   []8   []9   []10                MOBILITY:  [x] Bed    [] Stretcher      All Vitals and Treatment Details on Attached [address]: [facility] [place]          Room # 9183/12    [x] Routine         [] 1st Time Acute/Chronic   [] Urgent      [] Stat            [x] Acute Room   []  Bedside    [] ICU/CCU     [] ER     Isolation Precautions:  [x] Dialysis    Contact     ALLERGIES:     Allergies   Allergen Reactions    Albumin, Human 25 % Itching     Headache - severe migraine like, itchy eyes, runny nose    Ciprofloxacin Hives    Cyclopentolate Unknown (comments)    Iron Sucrose Diarrhea    Statins-Hmg-Coa Reductase Inhibitors Other (comments)     Body ache        Code Status:  DNR     Hepatitis Status      Lab Results   Component Value Date/Time    Hepatitis B surface Ag <0.10 11/17/2021 02:20 PM    Hepatitis B surface Ab 29.33 11/17/2021 02:20 PM        Current Labs:      Lab Results   Component Value Date/Time    WBC 3.9 (L) 05/26/2022 04:31 AM    Hemoglobin, POC 8.8 (L) 09/24/2020 08:45 AM    HGB 8.9 (L) 05/26/2022 04:31 AM    Hematocrit, POC 26 (L) 09/24/2020 08:45 AM    HCT 27.8 (L) 05/26/2022 04:31 AM    PLATELET 128 44/02/8461 04:31 AM    .1 (H) 05/26/2022 04:31 AM     Lab Results   Component Value Date/Time    Sodium 141 05/26/2022 04:31 AM    Potassium 4.3 05/26/2022 04:31 AM    Chloride 103 05/26/2022 04:31 AM    CO2 33 (H) 05/26/2022 04:31 AM    Anion gap 5 05/26/2022 04:31 AM    Glucose 97 05/26/2022 04:31 AM    BUN 12 05/26/2022 04:31 AM    Creatinine 4.04 (H) 05/26/2022 04:31 AM    BUN/Creatinine ratio 3 (L) 05/26/2022 04:31 AM    GFR est AA 13 (L) 05/26/2022 04:31 AM    GFR est non-AA 11 (L) 05/26/2022 04:31 AM    Calcium 9.2 05/26/2022 04:31 AM          DIET:  DIET ADULT     PRIMARY NURSE REPORT:   Pre Dialysis: Ramirez Jarvis RN    Time: 4129      EDUCATION:    [x] Patient           Knowledge Basis: []None []Minimal [x] Substantial [] Unknown  Barriers to learning  [x]None  [] Intubated/Trached/Ventilated  [] Sedated/Paralyzed   [] Access Care     [] S&S of infection  [] Fluid Management  [] K+   [x] Procedural    [] Medications   [] Tx Options   [] Transplant   [] Diet      Teaching Tools:  [x] Explain  [] Demo  [] Handouts [] Video  Patient response: [x] Verbalized understanding   [] Requires follow up        [x] Time Out/Safety Check    [x] Extracorporeal Circuit Tested for integrity       RO/HEMODIALYSIS MACHINE SAFETY CHECKS - Before each treatment:        [address] [place] [address]                                     [x] Unit Machine # 9 with centralized RO                                  [] Portable Machine #1/RO serial # [id number]                                  [] Portable Machine #2/RO serial # [id number]                                  [] Portable Machine #4/RO serial # [id number]                                  [] Portable Machine #10/RO serial # [id number]                                                                                                       Alarm Test:  Pass time [id number]            [x] RO/Machine Log Complete    Machine Temp    36-37*C             Dialysate: pH  7.4    Conductivity: Meter 14.0    HD Machine  13.6     TCD: 13.7  Dialyzer Lot # [id number]     Blood Tubing Lot # [id number]     Saline Lot # [id number]     CHLORINE TESTING-Before each treatment and every 4 hours    Total Chlorine: [x] less than 0.1 ppm  Initial Time Check: 0800    4 Hr/2nd Check Time: 1200   (if greater than 0.1 ppm from Primary then every 30 minutes from Secondary)     TREATMENT INITIATION - with Dialysis Precautions:   [x] All Connections Secured              [x] Saline Line Double Clamped   [x] Venous Parameters Set               [x] Arterial Parameters Set    [x] Prime Given 250ml NSS              [x]Air Foam Detector Engaged        Treatment Initiation Note:  See above note    During Treatment Notes:  4930  Face & Vascular access visible with art and nichelle line connections intact. Pt tolerating dialysis. 1030  Face & Vascular access visible with art and nichelle line connections intact. Pt tolerating dialysis. 1045  Face & Vascular access visible with art and nichelle line connections intact. Pt tolerating dialysis. 1100  Face & Vascular access visible with art and nichelle line connections intact. Pt tolerating dialysis. 1115  Face & Vascular access visible with art and nichelle line connections intact. Pt tolerating dialysis. 1130  Face & Vascular access visible with art and nichelle line connections intact. Pt tolerating dialysis. 1145  Face & Vascular access visible with art and nichelle line connections intact. Pt tolerating dialysis. 1200  Face & Vascular access visible with art and nichelle line connections intact. Pt tolerating dialysis. 1215  Face & Vascular access visible with art and nichelle line connections intact. Pt tolerating dialysis. 1230  Face & Vascular access visible with art and nichelle line connections intact. Pt tolerating dialysis. 1245  Face & Vascular access visible with art and nichelle line connections intact. Pt tolerating dialysis. 1300  Face & Vascular access visible with art and nichelle line connections intact. Pt tolerating dialysis. 1315  Face & Vascular access visible with art and nichelle line connections intact. Pt tolerating dialysis. 1330  Face & Vascular access visible with art and nichelle line connections intact. Pt tolerating dialysis. 1345  Face & Vascular access visible with art and nichelle line connections intact. Pt tolerating dialysis. 1357  Dialysis treatment complete.        Medication    Dose    Volume Route      Time       Dat Vera RN      HD         HD        Post Assessment  Dialyzer Cleared:   [] Good  [x] Fair  [] Poor  Blood processed:  67.9 L  UF Removed:  1000 mL    Post BP: 123/50  Pulse: 79  Respirations: 18   Temp: 97.7  [x] Oral  [] Ax  [] Esophageal   Lungs: [] Clear                [x] No change from initial assessment   Post Tx Vascular Access: [] N/A  AVF/AVG: Bleeding stopped with  Arterial Pressure for 5 min   Venous Pressure for 5 min      Cardiac:  [x] Regular   [] Irregular   Rhythm:  [x] Monitored   [] Not Monitored    CVC Catheter: [x] N/A  Locking solution: Heparin 1:1000 U  Arterial port   ml   Venous port   ml   Edema:  [x] None  [] Generalized                     Skin:[x] Warm  [x] Dry [] Diaphoretic               [] Flushed  [] Pale [] Cyanotic Pain:  [x]0  []1-2  []3-4  []5-6   []7-8  []9-10         Post Treatment Note:  See above note     POST TREATMENT PRIMARY NURSE HANDOFF REPORT:   Post Dialysis: Karma Nogueira RN        Time:  4014       Abbreviations: AVG-arterial venous graft, AVF-arterial venous fistula, IJ-Internal Jugular, Subcl-Subclavian, Fem-Femoral, Tx-treatment, AP/HR-apical heart rate, VSS- Vital Signs Stable, CVC- Central Venous Catheter, DFR-dialysate flow rate, BFR-blood flow rate, AP-arterial pressure, -venous pressure, UF-ultrafiltrate, TMP-transmembrane pressure, Jasper-Venous, Art-Arterial, RO-Reverse Osmosis

## 2022-05-26 NOTE — PROGRESS NOTES
Problem: Risk for Spread of Infection  Goal: Prevent transmission of infectious organism to others  Description: Prevent the transmission of infectious organisms to other patients, staff members, and visitors. Outcome: Progressing Towards Goal     Problem: Patient Education:  Go to Education Activity  Goal: Patient/Family Education  Outcome: Progressing Towards Goal     Problem: Pressure Injury - Risk of  Goal: *Prevention of pressure injury  Description: Document Giovany Scale and appropriate interventions in the flowsheet. Outcome: Progressing Towards Goal  Note: Pressure Injury Interventions:       Moisture Interventions: Absorbent underpads,Minimize layers    Activity Interventions: Pressure redistribution bed/mattress(bed type)    Mobility Interventions: Pressure redistribution bed/mattress (bed type)    Nutrition Interventions: Document food/fluid/supplement intake                     Problem: Falls - Risk of  Goal: *Absence of Falls  Description: Document Freddy Fall Risk and appropriate interventions in the flowsheet.   Outcome: Progressing Towards Goal  Note: Fall Risk Interventions:  Mobility Interventions: Patient to call before getting OOB,Strengthening exercises (ROM-active/passive),Utilize walker, cane, or other assistive device         Medication Interventions: Evaluate medications/consider consulting pharmacy,Patient to call before getting OOB,Teach patient to arise slowly    Elimination Interventions: Call light in reach,Patient to call for help with toileting needs              Problem: Patient Education: Go to Patient Education Activity  Goal: Patient/Family Education  Outcome: Progressing Towards Goal     Problem: Patient Education: Go to Patient Education Activity  Goal: Patient/Family Education  Outcome: Resolved/Met     Problem: Patient Education: Go to Patient Education Activity  Goal: Patient/Family Education  Outcome: Resolved/Met

## 2022-05-26 NOTE — PROGRESS NOTES
Bedside and Verbal shift change report given to Burl Spurling (oncoming nurse) by Zuleyka Pitt RN (offgoing nurse). Report included the following information SBAR, Kardex, Intake/Output, MAR and Cardiac Rhythm Afib that converted to NSR.

## 2022-05-26 NOTE — PROGRESS NOTES
Progress Note  63y F with PMH ESRD, DM, afib, admitted for chest discomfort,following for ESRD management. Subjective        Overnight event noted  Yesterday she did not tolerate UF more than 1L. Her cath did not show any signficiant obstruction. After discussion with cardiology colleague, arranged extra treatment to improve her volume status and mobility. Examine during dialysis, BP acceptable    IMPRESSION:   ESRD, TTS, last HD yesterday   Access; left arm AVF  Chest discomfort and short of breath, suspect ACS, awaiting for cardiac cath  afib with rvr  H/o nephrolithiasis, complicated UTI  H/o hypotension during dialysis    PLAN:    Plan for HD today with 2K bath UF goal 1.5-2L tolerated   Adjust meds per ESRD status. At 11:00 AM on 2022, I saw and examined patient during hemodialysis treatment. The patient was receiving hemodialysis for treatment of  renal failure. I have also reviewed vital signs, intake and output, lab results and recent events, and agreed with today's dialysis order. HD rounding    Blood pressure 121/62, pulse 74, temperature 98 °F (36.7 °C), temperature source Oral, resp. rate 18, height 5' 2\" (1.575 m), weight 90.7 kg (200 lb), SpO2 100 %.   Temp (24hrs), Av.6 °F (36.4 °C), Min:97 °F (36.1 °C), Max:98.1 °F (36.7 °C)      Blood Pressure: BP: 121/62  Pulse: Pulse (Heart Rate): 74  Temp:  Temp: 98 °F (36.7 °C)    Artificial Kidney Dialyzer/Set Up Inspection: Revaclear   hours Duration of Treatment (hours): 3.5 hours   Heparin Bolus    Blood flow rate Blood Flow Rate (ml/min): 350 ml/min   Dialysate rate     Arterial Access Pressure Arterial Access Pressure (mmHg): -150  Venous Return Pressure Venous Return Pressure (mmHg): 130  Ultrafiltration Rate Goal/Amount of Fluid to Remove (mL): 2500 mL  Fluid Removal Fluid Removed (mL): 273  Net Fluid Removal NET Fluid Removed (mL): 1000 ml      Facility-Administered Medications: None       Current Facility-Administered Medications   Medication Dose Route Frequency    aspirin tablet 325 mg  325 mg Oral DAILY    0.9% sodium chloride infusion 250 mL  250 mL IntraVENous DIALYSIS PRN    sodium chloride (NS) flush 5-40 mL  5-40 mL IntraVENous Q8H    sodium chloride (NS) flush 5-40 mL  5-40 mL IntraVENous PRN    sodium chloride (NS) flush 5-40 mL  5-40 mL IntraVENous Q8H    sodium chloride (NS) flush 5-40 mL  5-40 mL IntraVENous PRN    acetaminophen (TYLENOL) tablet 650 mg  650 mg Oral Q6H PRN    Or    acetaminophen (TYLENOL) suppository 650 mg  650 mg Rectal Q6H PRN    polyethylene glycol (MIRALAX) packet 17 g  17 g Oral DAILY PRN    allopurinoL (ZYLOPRIM) tablet 100 mg  100 mg Oral Q MON, WED & FRI    amiodarone (CORDARONE) tablet 200 mg  200 mg Oral DAILY    calcitRIOL (ROCALTROL) capsule 0.25 mcg  0.25 mcg Oral DAILY    DULoxetine (CYMBALTA) capsule 20 mg  20 mg Oral DAILY    [Held by provider] fludrocortisone (FLORINEF) tablet 0.1 mg  0.1 mg Oral DAILY    gabapentin (NEURONTIN) capsule 100 mg  100 mg Oral QHS    latanoprost (XALATAN) 0.005 % ophthalmic solution 1 Drop  1 Drop Both Eyes QHS    levothyroxine (SYNTHROID) tablet 125 mcg  125 mcg Oral ACB    lidocaine 4 % patch 1 Patch  1 Patch TransDERmal DAILY    meclizine (ANTIVERT) tablet 25 mg  25 mg Oral TID PRN    melatonin tablet 5 mg  5 mg Oral QHS    midodrine (PROAMATINE) tablet 10 mg  10 mg Oral TID WITH MEALS    pantoprazole (PROTONIX) tablet 40 mg  40 mg Oral DAILY    sevelamer carbonate (RENVELA) tab 1,600 mg  1,600 mg Oral TID WITH MEALS    apixaban (ELIQUIS) tablet 5 mg  5 mg Oral BID       Review of Systems:     Complete 10-point review of systems were obtained and discussed in length  with the patient. Complete review of systems was negative/unremarkable  except as mentioned in HPI section.   Data Review:    Labs: Results:       Chemistry Recent Labs     05/26/22  0431 05/25/22  0912   GLU 97 100*    141   K 4.3 4.6    102   CO2 33* 30   BUN 12 31*   CREA 4.04* 6.72*   CA 9.2 9.1   AGAP 5 9   BUCR 3* 5*   AP 88 95   TP 6.5 6.6   ALB 2.9* 3.3*   GLOB 3.6 3.3   AGRAT 0.8 1.0      CBC w/Diff Recent Labs     05/26/22  0431 05/25/22  0912   WBC 3.9* 5.0   RBC 2.67* 2.78*   HGB 8.9* 9.1*   HCT 27.8* 28.8*    216   GRANS 48 61   LYMPH 36 23   EOS 3 2      Coagulation No results for input(s): PTP, INR, APTT, INREXT, INREXT in the last 72 hours. Iron/Ferritin No results for input(s): IRON in the last 72 hours. No lab exists for component: TIBCCALC   BNP No results for input(s): BNPP in the last 72 hours. Cardiac Enzymes No results for input(s): CPK, CKND1, PATTI in the last 72 hours.     No lab exists for component: CKRMB, TROIP   Liver Enzymes Recent Labs     05/26/22  0431   TP 6.5   ALB 2.9*   AP 88      Thyroid Studies Lab Results   Component Value Date/Time    TSH 0.60 05/25/2022 09:12 AM         EKG: afib with rvr     Physical Assessment:     Visit Vitals  /62   Pulse 74   Temp 98 °F (36.7 °C) (Oral)   Resp 18   Ht 5' 2\" (1.575 m)   Wt 90.7 kg (200 lb)   SpO2 100%   BMI 36.58 kg/m²     Weight change:     Intake/Output Summary (Last 24 hours) at 5/26/2022 1056  Last data filed at 5/26/2022 0347  Gross per 24 hour   Intake 180 ml   Output 1000 ml   Net -820 ml     Physical Exam:   General: comfortable, no acute distress   HEENT sclera anicteric, supple neck, no thyromegaly  CVS: S1S2 heard,  no rub  RS: + air entry b/l,   Abd: Soft, Non tender, Not distended  Neuro: non focal, awake, alert , CN II-XII are grossly intact  Extrm: no edema, no cyanosis, clubbing   Skin: no visible  Rash  Musculoskeletal: No gross joints or bone deformities     Procedures/imaging: see electronic medical records for all procedures, Xrays and details which were not copied into this note but were reviewed prior to creation of Nimesh Frausto MD  May 26, 2022  [facility]  Office [phone]

## 2022-05-26 NOTE — PROGRESS NOTES
PT orders received and chart reviewed. PT eval attempted at 0917. Pt performing walk test with RN. 2nd attempt at 1017. Pt currently off floor. Will continue to follow. 3rd attempt a t [phone], pt off floor at dialysis.      Thank you for this referral.   Leslie Mata PT DPT

## 2022-05-26 NOTE — PROGRESS NOTES
2000 .TRANSFER - IN REPORT:    Verbal report received from DONAL Cox RN(name) on [place]  being received from HD(unit) for routine progression of care      Report consisted of patients Situation, Background, Assessment and   Recommendations(SBAR). Information from the following report(s) Procedure Summary and Recent Results was reviewed with the receiving nurse. Opportunity for questions and clarification was provided. Assessment completed upon patients arrival to unit and care assumed. 2020  Received Pt. Per Stretcher from HD. Pt is AOX 4 transferred to bed without a problem. Pt.  Educated on room equipments and call bell  when in need of help and assistance. Pt. Educated on MD's  orders and plans for the evening. Pt. Verbalized understanding. Pt. Denies discomfort. Will continue to monitor Pt. Condition. 2040  Pt. Provided dinner. 2145  Night meds given. 2153 Dr. Christine Mcardle and Dr Brad Geronimo Notified and inquired which site should phlebotomist draw blood from Pt. L UA has AV fistula, R arm with Left/Right [place] cath/coronary angiography. MD made no order at this time. 2300  Pt. Made no complaints. 0030  Pt. Resting with eyes closed, easily awaken. 0130  Pt. Complaints of back pain, given tylenol per MAR    0200  PT. Resting in bed.    0345  Pt. Given chlorhexidene care. 0515  Pt. Resting with eyes closed easily awaken. 0630  Pt. Made no complaints. Verbal and bedside Shift changed report given to Jahaira Mcdermott (oncoming RN) on Pt. Condition. Report consisted of patients Situation, History, Activities, intake/output,  Background, Assessment and Recommendations(SBAR). Information from the following report(s) Kardex, order Summary, Lab results and MAR was reviewed with the receiving nurse. Opportunity for questions and clarification was provided. no

## 2022-05-26 NOTE — PROGRESS NOTES
Problem: Risk for Spread of Infection  Goal: Prevent transmission of infectious organism to others  Description: Prevent the transmission of infectious organisms to other patients, staff members, and visitors.   5/25/2022 2042 by Yves Stovall RN  Outcome: Progressing Towards Goal  5/25/2022 2040 by Yves Stovall RN  Outcome: Progressing Towards Goal     Problem: Patient Education:  Go to Education Activity  Goal: Patient/Family Education  5/25/2022 2042 by Yves Stovall RN  Outcome: Progressing Towards Goal  5/25/2022 2040 by Yves Stovall RN  Outcome: Progressing Towards Goal

## 2022-05-26 NOTE — PROGRESS NOTES
INTERVENTION:  HEMODYNAMIC STABILIZATION  MAINTAIN BP WNL WHILE ON HD. INTERVENTION:  FLUID MANAGEMENT  WILL ATTEMPT 2500 ML TOTAL FLUID REMOVAL AS TOLERATED. INTERVENTION:  METABOLIC/ELECTROLYTE MANAGEMENT  2.0 POTASSIUM 2.5 CALCIUM DIALYSATE USED WITH HD TODAY. INTERVENTION:  HEMODIALYSIS ACCESS SITE MANAGEMENT  LEFT UPPER ARM AVF W/15G NEEDLE ACCESSED USING ASEPTIC TECHNIQUE. GOAL:  SIGNS AND SYMPTOMS OF LISTED POTENTIAL PROBLEMS WILL BE ABSENT OR MANAGEABLE. OUTCOME:  PROGRESSING. HD PLANNED FOR 3.5 HOURS TODAY.

## 2022-05-26 NOTE — PROGRESS NOTES
conducted an initial consultation and Spiritual Assessment for Stevie Hubbard, who is a 64 y.o.,female. Patients Primary Language is: [place]. According to the patients EMR Scientologist Affiliation is: Voodoo.      The reason the Patient came to the hospital is:   Patient Active Problem List    Diagnosis Date Noted    ESRD on dialysis ([address] 05/25/2022    SOB (shortness of breath) on exertion 05/25/2022    Stricture of female urethra 04/12/2022    Gross hematuria 12/02/2021    Hyperkalemia 11/29/2021    Mononeuropathy     Hyperlipidemia     Hypothyroidism     History of kidney stones     Chronic pain     Lung mass     History of urethral stricture     Uric acid nephrolithiasis     Urinary incontinence     Glaucoma     Depression     Multiple closed pelvic fractures without disruption of pelvic ring ([address] 11/19/2021    Impaired mobility and ADLs 11/19/2021    Hyperphosphatemia 11/14/2021    Closed fracture of left inferior pubic ramus, with routine healing, subsequent encounter 11/12/2021    Closed nondisplaced fracture of anterior wall of left acetabulum with routine healing 11/12/2021    Closed fracture of superior pubic ramus, left, with routine healing, subsequent encounter 11/12/2021    Anticoagulated by anticoagulation treatment     Paroxysmal atrial fibrillation (HCC)     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    History of septic shock 10/08/2021    History of urinary tract infection 10/08/2021    Need for prophylactic isolation 10/08/2021    History of hydronephrosis 10/05/2021    End-stage renal disease on hemodialysis ([address]     History of sepsis 06/18/2021    History of recurrent urinary tract infection     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 Listened empathically as he talked about she was feeling. She had just come back from [place] and she was doing pretty good she thought. Asked about her having an advance directive and she replied yes and that there is a copy of it here. Provided information about Spiritual Care Services. Offered prayer and assurance of continued prayers on patients behalf. Chart reviewed. The following outcomes were achieved:  Patient shared limited information about her medical narrative and spiritual journey/beliefs. Patient processed feeling about current hospitalization. Patient expressed gratitude for pastoral care visit. Assessment:  Patient does not have any Baptism/cultural needs that will affect patients preferences in health care. There are no further spiritual or Baptism issues which require Spiritual Care Services interventions at this time. Plan:  Chaplains will continue to follow and will provide pastoral care on an as needed/requested basis    . Minnie Belcher   Spiritual Care   [phone]

## 2022-05-26 NOTE — PROGRESS NOTES
0930 6 minute walk test performed. Pt obtained 92% o2 levels on room air while ambulating. Pt felt SOB at minute 5 and had to stop for a moment, but continued back after a brief break. Pt back in bed doing a neb treatment.  O2 levels back to 98%    Will continue to monitor

## 2022-05-26 NOTE — PROGRESS NOTES
Reason for Admission:   ESRD on dialysis ([facility] [N18.6, Z99.2]  SOB (shortness of breath) on exertion [R06.02]               RUR Score:     26             Resources/supports as identified by patient/family:       Top Challenges facing patient (as identified by patient/family and CM): Finances/Medication cost?     No  Transportation      No  Support system or lack thereof? No  Living arrangements? No  Self-care/ADLs/Cognition? No        Current Advanced Directive/Advance Care Plan:   yes    Healthcare Decision Maker:   Primary Decision MakerWindylazaro Grady - [phone]    Secondary Decision Maker: Kaykay Ribeiro - Daughter-in-Law - [phone]    Click here to complete [employer] including selection of the Healthcare Decision Maker Relationship (ie \"Primary\")                          Plan for utilizing home health:    no                      Likelihood of readmission:   HIGH    Transition of Care Plan:                    Initial assessment completed with patient. Cognitive status of patient: oriented to time, place, person and situation at time of assessment. Face sheet information confirmed:  yes. The patient designates son, Diana Emerson, to participate in her discharge plan and to receive any needed information. This patient lives in a single family home with son and daughter in law. The patient lives in a 2 level home. The patient's bedroom is on the 2nd floor. The patient has a stairlift to get from 1st floor to 2nd floor. Patient is able to navigate steps as needed. Prior to hospitalization, patient was considered to be independent with ADLs/IADLS : yes . If not independent,  patient needs assist with : cooking. The patient reported she can cook but her son also cooks. Patient has a current ACP document on file: yes  The son will be available to transport patient home upon discharge.    The patient already has Sal[employer] Paulino, [employer], W/C, [facility] equipment available in the home. Patient is not currently active with home health. The patient reported that she previously had [facility]. Patient has stayed in a skilled nursing facility or rehab. Was  stay within last 60 days : no. The patient reported that she previously had acute rehab, in November 2021, at [address]. This patient is on dialysis :yes    If yes, hemodialysis patient and receives treatment on Monday/Wednesday/Friday at [facility] [phone]  Chair time is 8:30am. Pt is transported to/from dialysis by public transportation, [employer], to dialysis. Currently, the discharge plan is Home. The patient states that she can obtain her medications from the pharmacy, and take her medications as directed. Patient's current insurance is [employer]. Care Management Interventions  PCP Verified by CM:  Yes  Mode of Transport at Discharge:  (son)  Transition of Care Consult (CM Consult): Discharge Planning  Discharge Durable Medical Equipment:  (patient has rolling walker, wheelchair, cane, rollator, showerchair)  Support Systems: Child(isaura) (son and daughter in law)  Confirm Follow Up Transport:  (public transportantion handiride (paratransit))  Discharge Location  Patient Expects to be Discharged to[de-identified] Home with family assistance      ABHISHEK Delaney

## 2022-05-26 NOTE — PROGRESS NOTES
Cardiology Progress Note    Admit Date: 5/25/2022  Attending Cardiologist: Dr. Elma Washington:     Hospital Problems  Date Reviewed: 5/14/2022          Codes Class Noted POA    ESRD on dialysis [facility]) ICD-10-CM: N18.6, Z99.2  ICD-9-CM: 585.6, V45.11  5/25/2022 Unknown        * (Principal) SOB (shortness of breath) on exertion ICD-10-CM: R06.02  ICD-9-CM: 786.05  5/25/2022 Unknown            -Chest pain, non cardiac. · S/p LHC on 5/25, which showed no evidence of obstructive coronary disease. Moderate disease noted in distal RCA. Tortuous coronary arteries noted. LVEDP was mildly elevated at 15mmHg. given no obvious etiology for her ongoing symptoms, right heart cath was also performed via right common femoral vein under ultrasound guidance. Was notable for mildly elevated filling pressures and mild pulmonary hypertension with normal cardiac output. · ECHO (04/22) with preserved LV function. No significant valvular pathology. · Low risk nuclear stress test (11/20)   -Paroxysmal Afib, currently in SR. Anticoagulated with Eliquis as outpatient, with plans to consider watchman in the future. · Last dose taken 5/24 am.   -HLD, not on statin d/t intolerance. -DM  -ESRD on HD, M/W/F  -Anemia of chronic disease.   -Chronic hypotension, on Midodrine.   -Hypothyroidism on synthroid        Primary cardiologist Dr. Tiney Kayser.   Plan:     Addendum: Independently seen and evaluated. Agree with below. As noted above, she has noncoronary chest pains. Her coronary angiography yesterday did not show any significant, fixed, obstructive CAD. Would continue medical therapy. Increase activity as tolerated. No new cardiac recommendations at this time.    -Patient doing well s/p LHC yesterday. Findings as stated above. -Resume Eliquis today for stroke prophylaxis today. Continue amiodarone.   -Will arrange for outpatient [facility] for pre procedural watchman planning.    -HD today, appreciate nephrology assistance.   -Plan for patient to follow up with Dr. Danial Gaucher in 4 weeks.   -may benefit from pulmonary consult.   -No further recommendations from a CV standpoint. Will sign off and be available as needed. Subjective:     No new complaints. Denies CP but still having SOB. HD today.      Objective:      Patient Vitals for the past 8 hrs:   Temp Pulse Resp BP SpO2   05/26/22 1045 -- 74 18 121/62 --   05/26/22 1030 -- 68 18 (!) 129/59 --   05/26/22 1022 -- 81 18 (!) 112/48 --   05/26/22 1015 98 °F (36.7 °C) 78 18 (!) 129/47 --   05/26/22 0720 97.6 °F (36.4 °C) 73 18 131/79 100 %   05/26/22 0347 98 °F (36.7 °C) 64 18 (!) 156/65 99 %         Patient Vitals for the past 96 hrs:   Weight   05/25/22 1426 90.7 kg (200 lb)   05/25/22 0758 91 kg (200 lb 9.9 oz)       TELE:SR               Current Facility-Administered Medications   Medication Dose Route Frequency Last Admin    aspirin tablet 325 mg  325 mg Oral DAILY 325 mg at 05/26/22 0902    0.9% sodium chloride infusion 250 mL  250 mL IntraVENous DIALYSIS PRN      sodium chloride (NS) flush 5-40 mL  5-40 mL IntraVENous Q8H 10 mL at 05/26/22 0630    sodium chloride (NS) flush 5-40 mL  5-40 mL IntraVENous PRN      sodium chloride (NS) flush 5-40 mL  5-40 mL IntraVENous Q8H 10 mL at 05/26/22 0630    sodium chloride (NS) flush 5-40 mL  5-40 mL IntraVENous PRN      acetaminophen (TYLENOL) tablet 650 mg  650 mg Oral Q6H  mg at 05/26/22 2416    Or    acetaminophen (TYLENOL) suppository 650 mg  650 mg Rectal Q6H PRN      polyethylene glycol (MIRALAX) packet 17 g  17 g Oral DAILY PRN      allopurinoL (ZYLOPRIM) tablet 100 mg  100 mg Oral Q MON, WED &  mg at 05/25/22 2141    amiodarone (CORDARONE) tablet 200 mg  200 mg Oral DAILY 200 mg at 05/26/22 0902    calcitRIOL (ROCALTROL) capsule 0.25 mcg  0.25 mcg Oral DAILY 0.25 mcg at 05/26/22 0904    DULoxetine (CYMBALTA) capsule 20 mg  20 mg Oral DAILY 20 mg at 05/26/22 0905    [Held by provider] fludrocortisone (FLORINEF) tablet 0.1 mg  0.1 mg Oral DAILY      gabapentin (NEURONTIN) capsule 100 mg  100 mg Oral  mg at 05/25/22 2141    latanoprost (XALATAN) 0.005 % ophthalmic solution 1 Drop  1 Drop Both Eyes QHS      levothyroxine (SYNTHROID) tablet 125 mcg  125 mcg Oral  mcg at 05/26/22 0807    lidocaine 4 % patch 1 Patch  1 Patch TransDERmal DAILY 1 Patch at 05/26/22 6733    meclizine (ANTIVERT) tablet 25 mg  25 mg Oral TID PRN      melatonin tablet 5 mg  5 mg Oral QHS 5 mg at 05/25/22 2141    midodrine (PROAMATINE) tablet 10 mg  10 mg Oral TID WITH MEALS 10 mg at 05/26/22 0803    pantoprazole (PROTONIX) tablet 40 mg  40 mg Oral DAILY 40 mg at 05/26/22 0905    sevelamer carbonate (RENVELA) tab 1,600 mg  1,600 mg Oral TID WITH MEALS 1,600 mg at 05/26/22 0078    apixaban (ELIQUIS) tablet 5 mg  5 mg Oral BID 5 mg at 05/26/22 8802         Intake/Output Summary (Last 24 hours) at 5/26/2022 1112  Last data filed at 5/26/2022 0347  Gross per 24 hour   Intake 180 ml   Output 1000 ml   Net -820 ml       Physical Exam:  General:  alert, cooperative, no distress, appears stated age, obese  Neck:  no JVD  Lungs:  clear to auscultation bilaterally  Heart:  regular rate and rhythm, S1, S2 normal, no murmur, click, rub or gallop  Abdomen:  abdomen is soft without significant tenderness, masses, organomegaly or guarding  Extremities:  extremities normal, atraumatic, no cyanosis or edema    Visit Vitals  /62   Pulse 74   Temp 98 °F (36.7 °C) (Oral)   Resp 18   Ht 5' 2\" (1.575 m)   Wt 90.7 kg (200 lb)   SpO2 100%   BMI 36.58 kg/m²       Data Review:     Labs: Results:       Chemistry Recent Labs     05/26/22  0431 05/25/22  0912   GLU 97 100*    141   K 4.3 4.6    102   CO2 33* 30   BUN 12 31*   CREA 4.04* 6.72*   CA 9.2 9.1   MG 2.3  --    AGAP 5 9   BUCR 3* 5*   AP 88 95   TP 6.5 6.6   ALB 2.9* 3.3*   GLOB 3.6 3.3   AGRAT 0.8 1.0      CBC w/Diff Recent Labs     05/26/22  0431 05/25/22  0912   WBC 3.9* 5.0   RBC 2.67* 2.78*   HGB 8.9* 9.1*   HCT 27.8* 28.8*    216   GRANS 48 61   LYMPH 36 23   EOS 3 2      Cardiac Enzymes No results found for: CPK, CK, CKMMB, CKMB, RCK3, CKMBT, CKNDX, CKND1, PATTI, TROPT, TROIQ, GRAHAM, TROPT, TNIPOC, BNP, BNPP   Coagulation No results for input(s): PTP, INR, APTT, INREXT in the last 72 hours.     Lipid Panel No results found for: CHOL, CHOLPOCT, CHOLX, CHLST, CHOLV, 211330, HDL, HDLP, LDL, LDLC, DLDLP, 799023, VLDLC, VLDL, TGLX, TRIGL, TRIGP, TGLPOCT, CHHD, CHHDX   BNP No results found for: BNP, BNPP, XBNPT   Liver Enzymes Recent Labs     05/26/22  0431   TP 6.5   ALB 2.9*   AP 88      Thyroid Studies Lab Results   Component Value Date/Time    TSH 0.60 05/25/2022 09:12 AM          Signed By: Fenton Prader, PA-C     May 26, 2022

## 2022-05-27 ENCOUNTER — HOME HEALTH ADMISSION (OUTPATIENT)
Dept: HOME HEALTH SERVICES | Facility: HOME HEALTH | Age: 77
End: 2022-05-27
Payer: MEDICARE

## 2022-05-27 VITALS
WEIGHT: 212 LBS | DIASTOLIC BLOOD PRESSURE: 70 MMHG | RESPIRATION RATE: 16 BRPM | HEIGHT: 62 IN | SYSTOLIC BLOOD PRESSURE: 150 MMHG | BODY MASS INDEX: 39.01 KG/M2 | OXYGEN SATURATION: 100 % | TEMPERATURE: 97.1 F | HEART RATE: 71 BPM

## 2022-05-27 LAB
ALBUMIN SERPL-MCNC: 3.3 G/DL (ref 3.4–5)
ALBUMIN/GLOB SERPL: 0.8 {RATIO} (ref 0.8–1.7)
ALP SERPL-CCNC: 114 U/L (ref 45–117)
ALT SERPL-CCNC: 20 U/L (ref 13–56)
ANION GAP SERPL CALC-SCNC: 7 MMOL/L (ref 3–18)
AST SERPL-CCNC: 19 U/L (ref 10–38)
BASOPHILS # BLD: 0 K/UL (ref 0–0.1)
BASOPHILS NFR BLD: 1 % (ref 0–2)
BILIRUB SERPL-MCNC: 0.3 MG/DL (ref 0.2–1)
BUN SERPL-MCNC: 11 MG/DL (ref 7–18)
BUN/CREAT SERPL: 3 (ref 12–20)
CALCIUM SERPL-MCNC: 9.5 MG/DL (ref 8.5–10.1)
CHLORIDE SERPL-SCNC: 101 MMOL/L (ref 100–111)
CO2 SERPL-SCNC: 31 MMOL/L (ref 21–32)
CREAT SERPL-MCNC: 3.78 MG/DL (ref 0.6–1.3)
DIFFERENTIAL METHOD BLD: ABNORMAL
EOSINOPHIL # BLD: 0.2 K/UL (ref 0–0.4)
EOSINOPHIL NFR BLD: 3 % (ref 0–5)
ERYTHROCYTE [DISTWIDTH] IN BLOOD BY AUTOMATED COUNT: 14.8 % (ref 11.6–14.5)
GLOBULIN SER CALC-MCNC: 4.2 G/DL (ref 2–4)
GLUCOSE SERPL-MCNC: 109 MG/DL (ref 74–99)
HCT VFR BLD AUTO: 33.4 % (ref 35–45)
HGB BLD-MCNC: 10.3 G/DL (ref 12–16)
IMM GRANULOCYTES # BLD AUTO: 0.1 K/UL (ref 0–0.04)
IMM GRANULOCYTES NFR BLD AUTO: 1 % (ref 0–0.5)
LYMPHOCYTES # BLD: 2.4 K/UL (ref 0.9–3.6)
LYMPHOCYTES NFR BLD: 35 % (ref 21–52)
MCH RBC QN AUTO: 32.5 PG (ref 24–34)
MCHC RBC AUTO-ENTMCNC: 30.8 G/DL (ref 31–37)
MCV RBC AUTO: 105.4 FL (ref 78–100)
MONOCYTES # BLD: 0.7 K/UL (ref 0.05–1.2)
MONOCYTES NFR BLD: 10 % (ref 3–10)
NEUTS SEG # BLD: 3.4 K/UL (ref 1.8–8)
NEUTS SEG NFR BLD: 51 % (ref 40–73)
NRBC # BLD: 0 K/UL (ref 0–0.01)
NRBC BLD-RTO: 0 PER 100 WBC
PLATELET # BLD AUTO: 320 K/UL (ref 135–420)
PMV BLD AUTO: 9.9 FL (ref 9.2–11.8)
POTASSIUM SERPL-SCNC: 3.9 MMOL/L (ref 3.5–5.5)
PROT SERPL-MCNC: 7.5 G/DL (ref 6.4–8.2)
RBC # BLD AUTO: 3.17 M/UL (ref 4.2–5.3)
SODIUM SERPL-SCNC: 139 MMOL/L (ref 136–145)
T3 SERPL-MCNC: 52 NG/DL (ref 71–180)
WBC # BLD AUTO: 6.7 K/UL (ref 4.6–13.2)

## 2022-05-27 PROCEDURE — 74011000250 HC RX REV CODE- 250: Performed by: STUDENT IN AN ORGANIZED HEALTH CARE EDUCATION/TRAINING PROGRAM

## 2022-05-27 PROCEDURE — 74011250637 HC RX REV CODE- 250/637: Performed by: STUDENT IN AN ORGANIZED HEALTH CARE EDUCATION/TRAINING PROGRAM

## 2022-05-27 PROCEDURE — G0257 UNSCHED DIALYSIS ESRD PT HOS: HCPCS

## 2022-05-27 PROCEDURE — 80053 COMPREHEN METABOLIC PANEL: CPT

## 2022-05-27 PROCEDURE — 2709999900 HC NON-CHARGEABLE SUPPLY

## 2022-05-27 PROCEDURE — G0378 HOSPITAL OBSERVATION PER HR: HCPCS

## 2022-05-27 PROCEDURE — 36415 COLL VENOUS BLD VENIPUNCTURE: CPT

## 2022-05-27 PROCEDURE — 90935 HEMODIALYSIS ONE EVALUATION: CPT

## 2022-05-27 PROCEDURE — 85025 COMPLETE CBC W/AUTO DIFF WBC: CPT

## 2022-05-27 RX ORDER — MIDODRINE HYDROCHLORIDE 5 MG/1
5 TABLET ORAL
Status: DISCONTINUED | OUTPATIENT
Start: 2022-05-27 | End: 2022-05-27 | Stop reason: HOSPADM

## 2022-05-27 RX ORDER — GABAPENTIN 100 MG/1
100 CAPSULE ORAL
Qty: 30 CAPSULE | Refills: 0 | Status: SHIPPED | OUTPATIENT
Start: 2022-05-27

## 2022-05-27 RX ORDER — MIDODRINE HYDROCHLORIDE 5 MG/1
5 TABLET ORAL
Qty: 90 TABLET | Refills: 0 | Status: SHIPPED | OUTPATIENT
Start: 2022-05-27 | End: 2022-06-28

## 2022-05-27 RX ADMIN — SEVELAMER CARBONATE 1600 MG: 800 TABLET, FILM COATED ORAL at 09:21

## 2022-05-27 RX ADMIN — DULOXETINE 20 MG: 20 CAPSULE, DELAYED RELEASE ORAL at 09:21

## 2022-05-27 RX ADMIN — MIDODRINE HYDROCHLORIDE 10 MG: 5 TABLET ORAL at 06:43

## 2022-05-27 RX ADMIN — CALCITRIOL CAPSULES 0.25 MCG 0.25 MCG: 0.25 CAPSULE ORAL at 09:21

## 2022-05-27 RX ADMIN — PANTOPRAZOLE SODIUM 40 MG: 40 TABLET, DELAYED RELEASE ORAL at 09:22

## 2022-05-27 RX ADMIN — SODIUM CHLORIDE, PRESERVATIVE FREE 10 ML: 5 INJECTION INTRAVENOUS at 05:50

## 2022-05-27 RX ADMIN — LEVOTHYROXINE SODIUM 125 MCG: 125 TABLET ORAL at 05:49

## 2022-05-27 RX ADMIN — APIXABAN 5 MG: 5 TABLET, FILM COATED ORAL at 09:21

## 2022-05-27 NOTE — DISCHARGE INSTRUCTIONS
Patient Education        Hemodialysis Access: Before Your Surgery  What is a hemodialysis access? Hemodialysis is a way to remove wastes from the blood when your kidneys can no longer do the job. It is not a cure, but it can help you live longer and feel better. It is a lifesaving treatment when you have kidney failure. Hemodialysis is often called dialysis. Before you can start dialysis, a doctor will create a place where the blood can flow in and out of your body during dialysis. This is called the hemodialysis (or vascular) access. There are two basic types of permanent vascular access. · AV fistula: To make a fistula, a doctor will connect an artery to a vein, usually in your arm. Fistulas tend to be stronger and less likely to get infected than grafts. But they need to be prepared several months ahead of time. After that, the dialysis needles can be put into the fistula. · AV graft: This is a good choice if you have small veins or other problems. A doctor will put a tube under the skin in your arm. This tube is the graft. It connects an artery and a vein. The dialysis needles can then be put into the graft. A graft can sometimes be used as soon as 2 weeks after placement. You will get medicine to numb the area and help you feel relaxed during the surgery. The doctor will make a cut on the forearm of the arm you use the least. This cut is called an incision. The doctor will close it with stitches. The incision will leave a scar that fades with time. You will probably go home the same day as the surgery. You will probably need to take 1 or 2 days off from work after the surgery. How do you prepare for surgery? Surgery can be stressful. This information will help you understand what you can expect. And it will help you safely prepare for surgery. Preparing for surgery    · Be sure you have someone to take you home.  Anesthesia and pain medicine will make it unsafe for you to drive or get home on your own.     · Understand exactly what surgery is planned, along with the risks, benefits, and other options.     · If you take aspirin or some other blood thinner, ask your doctor if you should stop taking it before your surgery. Make sure that you understand exactly what your doctor wants you to do. These medicines increase the risk of bleeding.     · Tell your doctor ALL the medicines, vitamins, supplements, and herbal remedies you take. Some may increase the risk of problems during your surgery. Your doctor will tell you if you should stop taking any of them before the surgery and how soon to do it.     · Make sure your doctor and the hospital have a copy of your advance directive. If you don't have one, you may want to prepare one. It lets others know your health care wishes. It's a good thing to have before any type of surgery or procedure. What happens on the day of surgery? · Follow the instructions exactly about when to stop eating and drinking. If you don't, your surgery may be canceled. If your doctor told you to take your medicines on the day of surgery, take them with only a sip of water.     · Take a bath or shower before you come in for your surgery. Do not apply lotions, perfumes, deodorants, or nail polish.     · Do not shave the surgical site yourself.     · Take off all jewelry and piercings. And take out contact lenses, if you wear them. At the hospital or surgery center   · Bring a picture ID.     · The area for surgery is often marked to make sure there are no errors.     · You will be kept comfortable and safe by your anesthesia provider. You may get medicine that relaxes you or puts you in a light sleep. The area being worked on will be numb. When should you call your doctor?    · You have questions or concerns.     · You don't understand how to prepare for your surgery.     · You become ill before the surgery (such as fever, flu, or a cold).     · You need to reschedule or have changed your mind about having the surgery. Where can you learn more? Go to http://www.gray.com/  Enter V675 in the search box to learn more about \"Hemodialysis Access: Before Your Surgery. \"  Current as of: September 8, 2021               Content Version: 13.2  © 3445-5511 [employer] Care instructions adapted under license by [employer] (which disclaims liability or warranty for this information). If you have questions about a medical condition or this instruction, always ask your healthcare professional. [place] [address] any warranty or liability for your use of this information. DISCHARGE SUMMARY                 Cardiac Catheterization/Angiography Discharge Instructions    *Check the puncture site frequently for swelling or bleeding. If you see any bleeding, lie down and apply pressure over the area with a clean town or washcloth. Notify your doctor for any redness, swelling, drainage or oozing from the puncture site. Notify your doctor for any fever or chills. *If the leg or arm with the puncture becomes cold, numb or painful, call Dr Luis Bañuelos MD at  [phone]. *Activity should be limited for the next 48 hours. Climb stairs as little as possible and avoid any stooping, bending or strenuous activity for 48 hours. No heavy lifting (anything over 10 pounds) for three days. *Do not drive for 48 hours. *You may resume your usual diet. Drink more fluids than usual.    *Have a responsible person drive you home and stay with you for at least 24 hours after your heart catheterization/angiography. *You may remove the bandage from your Right Wrist and Groin in 24 hours. You may shower in 24 hours. No tub baths, hot tubs or swimming for one week. Do not place any lotions, creams, powders, ointments over the puncture site for one week. You may place a clean band-aid over the puncture site each day for 5 days.  Change this daily.           from Nurse    PATIENT INSTRUCTIONS:    After general anesthesia or intravenous sedation, for 24 hours or while taking prescription Narcotics:  · Limit your activities  · Do not drive and operate hazardous machinery  · Do not make important personal or business decisions  · Do  not drink alcoholic beverages  · If you have not urinated within 8 hours after discharge, please contact your surgeon on call. Report the following to your surgeon:  · Excessive pain, swelling, redness or odor of or around the surgical area  · Temperature over 100.5  · Nausea and vomiting lasting longer than 4 hours or if unable to take medications  · Any signs of decreased circulation or nerve impairment to extremity: change in color, persistent  numbness, tingling, coldness or increase pain  · Any questions    What to do at Home:  Recommended activity: No lifting, Driving, or Strenuous exercise for 24 hours. If you experience any of the following symptoms bleeding,swelling,acute pain or numbness, fever, please follow up with Dr. Ewa Lewis MD @ [phone]. .    *  Please give a list of your current medications to your Primary Care Provider. *  Please update this list whenever your medications are discontinued, doses are      changed, or new medications (including over-the-counter products) are added. *  Please carry medication information at all times in case of emergency situations. These are general instructions for a healthy lifestyle:    No smoking/ No tobacco products/ Avoid exposure to second hand smoke  Surgeon General's Warning:  Quitting smoking now greatly reduces serious risk to your health.     Obesity, smoking, and sedentary lifestyle greatly increases your risk for illness    A healthy diet, regular physical exercise & weight monitoring are important for maintaining a healthy lifestyle    You may be retaining fluid if you have a history of heart failure or if you experience any of the following symptoms:  Weight gain of 3 pounds or more overnight or 5 pounds in a week, increased swelling in our hands or feet or shortness of breath while lying flat in bed. Please call your doctor as soon as you notice any of these symptoms; do not wait until your next office visit. The discharge information has been reviewed with the patient. The patient verbalized understanding. Discharge medications reviewed with the patient and appropriate educational materials and side effects teaching were provided. [employer]    Thank you for requesting access to [employer]. Please follow the instructions below to securely access and download your online medical record. [employer] allows you to send messages to your doctor, view your test results, renew your prescriptions, schedule appointments, and more. How Do I Sign Up? 1. In your internet browser, go to [email or web address]. 2. Click on the First Time User? Click Here link in the Sign In box. You will see the New Member Sign Up page. 3. Enter your [employer] Access Code exactly as it appears below. You will not need to use this code after youve completed the sign-up process. If you do not sign up before the expiration date, you must request a new code. [employer] Access Code: [email or web address]  Expires: 2022  1:37 PM (This is the date your [employer] access code will )    4. Enter the last four digits of your Social Security Number (xxxx) and Date of Birth (mm/dd/yyyy) as indicated and click Submit. You will be taken to the next sign-up page. 5. Create a cortical.iot ID. This will be your [employer] login ID and cannot be changed, so think of one that is secure and easy to remember. 6. Create a [employer] password. You can change your password at any time. 7. Enter your Password Reset Question and Answer. This can be used at a later time if you forget your password. 8. Enter your e-mail address.  You will receive e-mail notification when new information is available in [employer]. 9. Click Sign Up. You can now view and download portions of your medical record. 10. Click the Download Summary menu link to download a portable copy of your medical information. Additional Information    If you have questions, please visit the Frequently Asked Questions section of the [employer] website at [email or web address] Remember, [employer] is NOT to be used for urgent needs. For medical emergencies, dial 911.     Patient armband removed and shredded___________________________________________________________________________________________________________________________________

## 2022-05-27 NOTE — DIALYSIS
ACUTE HEMODIALYSIS FLOW SHEET    HEMODIALYSIS ORDERS: Physician: Dr. Frannie Songr: [place]   Duration: 3.5 hr  BFR: 350   DFR: 600   Dialysate:  Temp 36   K+   2    Ca+  2.5   Na 138   Bicarb 35   Wt Readings from Last 1 Encounters:   05/27/22 96.2 kg (212 lb)    Patient Chart [x]   Unable to Obtain []  Dry weight/UF Goal: 2000 ml   Heparin []  Bolus    Units    [] Hourly    Units    []None       Pre BP:   117/78    Pulse:  52   Respirations: 16    Temperature:  97.0  [x] Oral [] Axillary  Tx: NSS  ml/Bolus   [x] N/A   Labs: []  Pre  []  Post:   [x] N/A   Additional Orders(medications, blood products, hypotension management): [x] Yes   [] No     [x]  [facility] Consent Verified     GRAFT/FISTULA ACCESS:   []N/A     []Right     [x]Left     [x]UE     []LE   []AVG   [x]AVF     []Buttonhole    [x]Medical Aseptic Prep Utilized   [x]No S/S infection  []Redness  []Drainage []Cultured  [] Swelling  [] Pain  Bruit:   [x] Strong    [] Weak       Thrill :   [x] Strong    [] Weak     Needle Gauge: 15   Length: 1 inch   If access problem,  notified: []Yes     [x]N/A     Please describe access if present and not used: N/A       GENERAL ASSESSMENT:    LUNGS:   SaO2%     [x] Clear  [] Coarse  [] Crackles  [] Wheezing                                                [] Diminished     Location : []RLL   []LLL    []RUL  []LALI   Cough: []Productive  []Dry  [x]N/A   Respirations:  [x]Easy  []Labored   Therapy:  [x]RA  []NC l/min    Mask: []NRB  [] Venti    O2%                  []Ventilator  []Intubated  [] Trach  [] BiPaP   CARDIAC: [x]Regular      [] Irregular   [] Pericardial Rub  [] JVD          []  Monitored  [] Bedside  [] Remotely monitored     EDEMA: [x] None  []Generalized  [] Pitting [] 1    [] 2    [] 3    [] 4                 [] Facial  [] Pedal  []  UE  [] LE   SKIN:   [x] Warm  [] Hot     [] Cold   [x] Dry     [] Pale   [] Diaphoretic                  [] Flushed  [] Jaundiced  [] Cyanotic  [] Rash  [] Weeping   LOC:    [x] Alert      [x]Oriented:    [x] Person     [x] Place  [x]Time               [] Confused  [] Lethargic  [] Medicated  [] Non-responsive  [] Non-Verbal   GI / ABDOMEN:                     [] Flat    [] Distended    [x] Soft    [] Firm   []  Obese                   [] Diarrhea  [x] Bowel Sounds  [] Nausea  [] Vomiting     / URINE ASSESSMENT:                   [] Voiding   [x] Oliguria  [] Anuria   []  Cline                  [] Incontinent  []  Incontinent Brief []  Fecal Management System     PAIN:  [x] 0 []1  []2   []3   []4   []5   []6   []7   []8   []9   []10            Scale 0-10  Action/Follow Up:    MOBILITY:  [x] Bed    [] Stretcher      All Vitals and Treatment Details on Attached [address]: [facility]          Room # 9994/10   [] 1st Time Acute      [] Stat       [x] Routine      [] Urgent     [x] Acute Room  []  Bedside  [] ICU/CCU  [] ER   Isolation Precautions:  [x] Dialysis    Contact       ALLERGIES:     Allergies   Allergen Reactions    Albumin, Human 25 % Itching     Headache - severe migraine like, itchy eyes, runny nose    Ciprofloxacin Hives    Cyclopentolate Unknown (comments)    Iron Sucrose Diarrhea    Statins-Hmg-Coa Reductase Inhibitors Other (comments)     Body ache      Code Status:  DNR     Hepatitis Status     Lab Results   Component Value Date/Time    Hepatitis B surface Ag <0.10 11/17/2021 02:20 PM    Hepatitis B surface Ab 29.33 11/17/2021 02:20 PM        Current Labs:      Lab Results   Component Value Date/Time    WBC 6.7 05/27/2022 09:41 AM    Hemoglobin, POC 8.8 (L) 09/24/2020 08:45 AM    HGB 10.3 (L) 05/27/2022 09:41 AM    Hematocrit, POC 26 (L) 09/24/2020 08:45 AM    HCT 33.4 (L) 05/27/2022 09:41 AM    PLATELET 999 74/30/6385 09:41 AM    .4 (H) 05/27/2022 09:41 AM     Lab Results   Component Value Date/Time    Sodium 139 05/27/2022 09:41 AM    Potassium 3.9 05/27/2022 09:41 AM    Chloride 101 05/27/2022 09:41 AM    CO2 31 05/27/2022 09:41 AM Anion gap 7 05/27/2022 09:41 AM    Glucose 109 (H) 05/27/2022 09:41 AM    BUN 11 05/27/2022 09:41 AM    Creatinine 3.78 (H) 05/27/2022 09:41 AM    BUN/Creatinine ratio 3 (L) 05/27/2022 09:41 AM    GFR est AA 14 (L) 05/27/2022 09:41 AM    GFR est non-AA 12 (L) 05/27/2022 09:41 AM    Calcium 9.5 05/27/2022 09:41 AM          DIET:  DIET ADULT      PRIMARY NURSE REPORT:   Pre Dialysis: Nikki Guillory RN     Time: 0636        EDUCATION:    [x] Patient [] Other           Knowledge Basis: []None [x]Minimal [] Substantial []Not able to assess at this time  Barriers to learning  [x]N/A  []Intubated/Ventilated  []Sedated   [] Access Care     [] S&S of infection  [] Fluid Management  [] K+   [x] Procedural    []Albumin   [] Medications   [] Tx Options   [] Transplant   [] Diet   [] Other   Teaching Tools:  [x] Explain  [] Demo  [] Handouts [] Video  Patient response: [x] Verbalized understanding  [] Teach back  [] Return demonstration   [] Requires follow up      [x]Time Out/Safety Check  [x] Extracorporeal Circuit Tested for integrity       [address] - Before each treatment:     Machine Number:                   [facility]                                    [x] Unit Machine # 3 with centralized RO                                                                            Alarm Test:  Pass time 1187           [x] RO/Machine Log Complete    Machine Temp    36.0             Dialysate: pH  7.4    Conductivity: Meter 14.0     HD Machine  14.0      TCD: 14.0  Dialyzer Lot # [id number]     Blood Tubing Lot # [id number]   Saline Lot # [id number]     CHLORINE TESTING-Before each treatment and every 4 hours    Total Chlorine: [x] less than 0.1 ppm  Initial Time Check: 0900       4 Hr/2nd Check Time:    (if greater than 0.1 ppm from Primary then every 30 minutes from Secondary)     TREATMENT INITIATION - with Dialysis Precautions:   [x] All Connections Secured              [x] Saline Line Double Clamped   [x] Venous Parameters Set               [x] Arterial Parameters Set    [x] Prime Given 250ml NSS              [x]Air Foam Detector Engaged      Treatment Initiation Note:  9506; Pt arrived to HD without any complaints. Pt A &O X 3, follows commands, no distress noted on RA. LUE AVF assessed no abnormalities noted, bruit and thrill strong. AVF accessed using  15G needles without any difficulty. Good flows achieved from both needles    1030;  Time out performed per policy, HD commenced, pt tolerated well. During Treatment Notes:  1050;Dr Cooper in attendance, order to reduce Total UF to 1.5L obtained, rest of the other orders remains unchanged. 1100;HD in good progress;VSS. Vascular access visible with arterial and venous line connections intact. 1130;HD in good progress,VSS. Vascular access visible with arterial and venous line connections intact. 1200;HD in good progress,VSS. Vascular access visible with arterial and venous line connections intact. 1230;HD in good progress,VSS. Vascular access visible with arterial and venous line connections intact. 1300;HD in good progress,VSS. Vascular access visible with arterial and venous line connections intact. 1330;HD in good progress, VSS. Vascular access visible with arterial and venous line connections intact. 1400; Dialysis treatment completed. Medication Dose Volume Route Time [facility] Nurse, Title   None     MACK Laboy RN     Post Assessment  Dialyzer Cleared:[] Good [x] Fair  [] Poor  Blood processed:  67.5 L  UF Removed:  1000 Ml  Post /70  Pulse  71 Resp  16   Temp 97.1  [x] Oral [] Axillary Lungs: [] Clear                [x] No change from initial assessment   Post Tx Vascular Access: [] N/A  AVF/AVG: Bleeding stopped with  Arterial Pressure for 8 min   Venous Pressure for 8 min         Cardiac:  [x] Regular   [] Irregular   Rhythm:  [] Monitored   [x] Not Monitored   Edema;     [x]    None;   []   Generalised   Skin:[x] Warm  [x] Dry [] Diaphoretic               [] Flushed  [] Pale [] Cyanotic   Pain:  [x]0  []1 []2  []3 []4  []5  []6 []7 []8  []9  []10       Post Treatment Note:  1420;VSS. Arterial and venous needles removed and pressure applied until hemostasis achieved. Dry dressings applied. Bruit/Thrill present above and below dressings. Dressing dry , clean and intact        POST TREATMENT PRIMARY NURSE HANDOFF REPORT:   Post Dialysis: Nikki WALTER RN                Time:  1430       Abbreviations: AVG-arterial venous graft, AVF-arterial venous fistula, IJ-Internal Jugular, Subcl-Subclavian, Fem-Femoral, Tx-treatment, AP/HR-apical heart rate, DFR-dialysate flow rate, BFR-blood flow rate, AP-arterial pressure, -venous pressure, UF-ultrafiltrate, TMP-transmembrane pressure, Jasper-Venous, Art-Arterial, RO-Reverse Osmosis

## 2022-05-27 NOTE — PROGRESS NOTES
put home health referral to [facility] in [address]      notified Yoselyn at [facility] about home health referral.        ABHISHEK Gonzalez

## 2022-05-27 NOTE — PROGRESS NOTES
Discharge order noted for today. Pt has been accepted to [facility] agency. The patient has all recommended dme at home. Met with patient and she is agreeable to the transition plan today. Transport has been arranged through daughter in law. Patient's discharge summary and home health  orders have been forwarded to [facility] home health  agency via [facility] Updated bedside RN, Jennifer Phillips,  to the transition plan.   Discharge information has been documented on the AVS.     ABHISHEK Santiago

## 2022-05-27 NOTE — PROGRESS NOTES
spoke with the patient regarding discharge planning and home health. The patient reported that she is aware that she is discharging.  inquired if the patient had a home health preference. The patient indicated that she would like to use [facility].  voiced an understanding.       ABHISHEK Napier

## 2022-05-27 NOTE — ROUTINE PROCESS
0700 Bedside and Verbal shift change report given to [facility] [address] (oncoming nurse) by Enoch Alejo (offgoing nurse). Report included the following information SBAR, Intake/Output, MAR and Cardiac Rhythm sinus rhythym    0900 Patient resting in bed with no complaints  .    3350 Report given to dialysis RN  Approximately 4255 Patient transferred to dialysis   1440 Patient back from dialysis in room   Patient discharged via private vehicle with instructions and follow up appointments with family member at [facility]

## 2022-05-27 NOTE — PROGRESS NOTES
[facility]  Intern Progress Note    Patient: Michael Haynes MRN: [id number]   SSN: [id number]  YOB: 1945   Age: 64 y.o. Sex: female      Admit Date: 5/25/2022    LOS: 2 days   Chief Complaint   Patient presents with    Shortness of Breath    Fatigue       Subjective:   Patient was seen at bedside today and had no complaints or concerns. ROS  Constitutional:. Negative for chills and fever. Respiratory: negative for SOB . Negative for cough.    Cardiovascular:  Negative for leg swelling. Gastrointestinal: Negative for nausea and vomiting. Skin: Negative for rash. Neurological: Negative for dizziness.      Objective:     Visit Vitals  BP (!) 127/55   Pulse 75   Temp 98.3 °F (36.8 °C)   Resp 20   Ht 5' 2\" (1.575 m)   Wt 96.2 kg (212 lb)   SpO2 100%   BMI 38.78 kg/m²       Physical Exam:   General:  AAOx3, NAD   HEENT: Conjunctiva pink, sclera anicteric. EOMI.  Pharynx moist, nonerythematous.  Moist mucous membranes.    CV:  RRR, no M/G/R. No visible pulsations or thrills.    RESP:  Unlabored breathing. Lungs CTAB, no wheezes, rales or rhonchi appreciated. Equal expansion bilaterally. ABD:  Soft, nontender, nondistended. No suprapubic tenderness. MS:  No joint deformity or instability.  No atrophy. Neuro:  CN II-XII grossly intact. 5/5 strength bilateral lower extremities.  Upper extremity exam limited due to patient receiving dialysis.  strength intact bilaterally. Ext:  No edema. Skin:  No rashes, lesions, or ulcers.  Good turgor.   Psych: normal mood and affect     Intake and Output:  Current Shift: 05/27 0701 - 05/27 1900  In: 120 [P.O.:120]  Out: -   Last three shifts: 05/25 1901 - 05/27 0700  In: 180 [P.O.:180]  Out: 2000     Lab/Data Review:  Recent Results (from the past 12 hour(s))   URINALYSIS W/ RFLX MICROSCOPIC    Collection Time: 05/26/22 11:00 PM   Result Value Ref Range    Color RED      Appearance BLOODY      Specific gravity 1.020 1.003 - 1.030 pH (UA) 8.5 (H) 5.0 - 8.0      Protein >300 (A) NEG mg/dL    Glucose 100 (A) NEG mg/dL    Ketone Negative NEG mg/dL    Bilirubin SMALL (A) NEG      Blood LARGE (A) NEG      Urobilinogen 0.2 0.2 - 1.0 EU/dL    Nitrites Negative NEG      Leukocyte Esterase MODERATE (A) NEG     URINE MICROSCOPIC ONLY    Collection Time: 05/26/22 11:00 PM   Result Value Ref Range    WBC  0 - 4 /hpf     UNABLE TO QUANTITATE MICROSCOPIC PARAMETERS DUE TO EXCESSIVE RBCS    RBC TOO NUMEROUS TO COUNT 0 - 5 /hpf    Epithelial cells 1+ 0 - 5 /lpf    Other:        Macroscopic performed on spun urine due to gross blood  or mucus     Assessment and Plan:     64 y. o. female with PMH of paroxysmal afib, ESRD on HD (MWF), HLD,  L hip fx (Nov 21), hypothyroidism, chronic hypotension, DMII (diet controlled) with neuropathy, depression, glaucoma, recurrent UTI's with hx of stones/R ureteral stent, gout, and chronic low back pain, now admitted with unstable angina pending heart cath.     Dyspnea on exertion, weakness  First occurred only at rest, but now exacerbated with minimal activity such as walking around home.    -Scheduled for outpatient cath procedure on 5/29, however symptoms worsen prior to this time, so patient presented at the ED. Brooklynn Teixeira concern for cardiac etiology and pt taken for cath. -Follows with Dr.East valdivia.   -ECG 5/25/22 and troponins wnl on admission. -CXR 5/25/22 - wnl.   Heart cath findings 5/25: no obstructive CAD. RHC  With mildly elevated filling pressures and mild pulmonary HTN, normal CO. ECHO 5/25: Normal left ventricular systolic function with a visually estimated EF of 55 - 60%. Left ventricle size is normal. Normal wall thickness. Normal wall motion. Per Dr. Kostas Vitale, cardiology no clear cardiac etiology for patient's significant dyspnea on exertion and weakness.  Will investigate other etiologies including worsening of chronic anemia, pulmonary source or endocrine disruptions.   Plan:  -continue ONEOK 325mg  - cards on board, appreciate recs  -Pulmonology signed off , PFT'S and sleep study inIf negative, dyspnea is likely 2/2 deconditioning or possibly postural hypotension. clinic may need high-resolution CT chest to r/o early ILD,  - Orthostatic hypotension testing  - daily CBC, CMP, Mg, Phos     Paroxysmal afib  Home meds: amiodarone 200mg, Eliquis 5mg bid  (last took [facility] on 5/24 am)  Plan:  Padmini Pritchard resumed              -resume home Eliquis am of 5/26 per cards recs              - needs outpt [facility] prior to watchman device, to be scheduled by    's office     ESRD on HD (MWF)  Nephrology consulted in the ED. Dr. Beth Bush would like to do additional dialysis 5/26/22   Plan:              - FU nephro recs     Chronic hypotension  - Continue Midodrine 10mg tid.     Hypothyroidism  TSH 1.75 in March 2021. Plan:              -RYIR home levothyroxine 125mcg     DM II with neuropathy  HbA1c 4.9% on 5/19/22. Diet controlled  Plan:              -Gabapentin 200mg tid     Depression  Continue Duloxetine 20mg.     Gout  Continue Allopurinol 100mg MWF.     Osteopenia w/ hx of closed L pelvic fx (Nov 2021)  Pt was admitted to [facility] in Nov 2021 for multiple closed PV fx's that did not require surgery, but pt went to ARU  Dexa scan with t score -1.5 in March 2022.  Vit D     Global Care:  - VS per unit routine  - supplemental O2 for sats < 92%  - incentive spirometer  - PT/OT/CM     Diet  nephro diet   DVT Prophylaxis  Eliquis   GI Prophylaxis  Pantoprazole    Code status  DNR   Disposition  home w/ Anahi Millan MD, PGY-1   [address]   Intern Pager: [phone]   May 27, 2022, 8:26 AM

## 2022-05-27 NOTE — PROGRESS NOTES
Spoke to Manuel Lynch letting her know that patient has 1 script ready at [facility]. She notified me that patient is in dialysis. I informed her the prescription copay is $65.72.

## 2022-05-27 NOTE — PROGRESS NOTES
0730: Bedside change of shift report given to Katlyn Welsh RN (On-coming nurse) by Dominguez Eli RN (Off-going nurse). Report included the following information SBAR, Kardex, Intake/Output, MAR and Cardiac Rhythm Afib.

## 2022-05-27 NOTE — PROGRESS NOTES
went to discuss discharge planning and home health with the patient. The patient was not in her room.  will follow up.     ABHISHEK Cotton

## 2022-05-27 NOTE — PROGRESS NOTES
Physician Progress Note      Anuj Hancock  CSN #:                  [id number]  :                       1945  ADMIT DATE:       2022 8:08 AM  DISCH DATE:  RESPONDING  PROVIDER #:        Drew Leal MD          QUERY TEXT:    Patient admitted with Dyspnea, noted to have Paroxysmal atrial fibrillation and is maintained on Eliquis. If possible, please document in progress notes and discharge summary if you are evaluating and/or treating any of the following: The medical record reflects the following:  Risk Factors: 65 yo female with PAF    Clinical Indicators: Per H&P Paroxysmal afib    Home meds include Eliquis 5 mg BID    Treatment: Eliquis on current med list      Thank you for your time,    Terrance NASHN, RN, [address], [facility]. [address]  C: [phone]    [email or web address]  Options provided:  -- Secondary hypercoagulable state in a patient with atrial fibrillation  -- Other - I will add my own diagnosis  -- Disagree - Not applicable / Not valid  -- Disagree - Clinically unable to determine / Unknown  -- Refer to Clinical Documentation Reviewer    PROVIDER RESPONSE TEXT:    This patient has secondary hypercoagulable state related to atrial fibrillation.     Query created by: Job Velasco on 2022 2:04 PM      Electronically signed by:  Drew Leal MD 2022 10:33 AM

## 2022-05-27 NOTE — PROGRESS NOTES
Progress Note  63y F with PMH ESRD, DM, afib, admitted for chest discomfort,following for ESRD management. Subjective        Overnight event noted  Examined during dialysis   Improving breathing  BP acceptable   She complains about poor tolerance with higher dose of midodrine  She feels shaky with higher dose. IMPRESSION:   ESRD, TTS, last HD yesterday   Access; left arm AVF  Chest discomfort and short of breath, suspect ACS, awaiting for cardiac cath  afib with rvr  H/o nephrolithiasis, complicated UTI  H/o hypotension during dialysis    PLAN:    Plan for HD today with 2K bath UF goal 1.5-2L tolerated   Will decrease midodrine 5mg tid and give extra pill , total 10mg prior HD. Monitor BP. Okay to discharge from renal stand point. Adjust meds per ESRD status. At 10:56 AM on 2022, I saw and examined patient during hemodialysis treatment. The patient was receiving hemodialysis for treatment of  renal failure. I have also reviewed vital signs, intake and output, lab results and recent events, and agreed with today's dialysis order. HD rounding    Blood pressure 110/60, pulse 61, temperature 97.1 °F (36.2 °C), temperature source Temporal, resp. rate 16, height 5' 2\" (1.575 m), weight 96.2 kg (212 lb), SpO2 100 %.   Temp (24hrs), Av.2 °F (36.8 °C), Min:97.1 °F (36.2 °C), Max:98.6 °F (37 °C)      Blood Pressure: BP: 110/60  Pulse: Pulse (Heart Rate): 61  Temp:  Temp: 97.1 °F (36.2 °C)    Artificial Kidney Dialyzer/Set Up Inspection: Revaclear   hours Duration of Treatment (hours): 3.5 hours   Heparin Bolus    Blood flow rate Blood Flow Rate (ml/min): 350 ml/min   Dialysate rate     Arterial Access Pressure Arterial Access Pressure (mmHg): -140  Venous Return Pressure Venous Return Pressure (mmHg): 140  Ultrafiltration Rate Goal/Amount of Fluid to Remove (mL): 2000 mL  Fluid Removal Fluid Removed (mL): 1500  Net Fluid Removal NET Fluid Removed (mL): 1000 ml        Facility-Administered Medications: None       Current Facility-Administered Medications   Medication Dose Route Frequency    0.9% sodium chloride infusion 250 mL  250 mL IntraVENous DIALYSIS PRN    sodium chloride (NS) flush 5-40 mL  5-40 mL IntraVENous Q8H    sodium chloride (NS) flush 5-40 mL  5-40 mL IntraVENous PRN    sodium chloride (NS) flush 5-40 mL  5-40 mL IntraVENous Q8H    sodium chloride (NS) flush 5-40 mL  5-40 mL IntraVENous PRN    acetaminophen (TYLENOL) tablet 650 mg  650 mg Oral Q6H PRN    Or    acetaminophen (TYLENOL) suppository 650 mg  650 mg Rectal Q6H PRN    polyethylene glycol (MIRALAX) packet 17 g  17 g Oral DAILY PRN    allopurinoL (ZYLOPRIM) tablet 100 mg  100 mg Oral Q MON, WED & FRI    amiodarone (CORDARONE) tablet 200 mg  200 mg Oral DAILY    calcitRIOL (ROCALTROL) capsule 0.25 mcg  0.25 mcg Oral DAILY    DULoxetine (CYMBALTA) capsule 20 mg  20 mg Oral DAILY    [Held by provider] fludrocortisone (FLORINEF) tablet 0.1 mg  0.1 mg Oral DAILY    gabapentin (NEURONTIN) capsule 100 mg  100 mg Oral QHS    latanoprost (XALATAN) 0.005 % ophthalmic solution 1 Drop  1 Drop Both Eyes QHS    levothyroxine (SYNTHROID) tablet 125 mcg  125 mcg Oral ACB    lidocaine 4 % patch 1 Patch  1 Patch TransDERmal DAILY    meclizine (ANTIVERT) tablet 25 mg  25 mg Oral TID PRN    melatonin tablet 5 mg  5 mg Oral QHS    midodrine (PROAMATINE) tablet 10 mg  10 mg Oral TID WITH MEALS    pantoprazole (PROTONIX) tablet 40 mg  40 mg Oral DAILY    sevelamer carbonate (RENVELA) tab 1,600 mg  1,600 mg Oral TID WITH MEALS    apixaban (ELIQUIS) tablet 5 mg  5 mg Oral BID       Review of Systems:     Complete 10-point review of systems were obtained and discussed in length  with the patient. Complete review of systems was negative/unremarkable  except as mentioned in HPI section.   Data Review:    Labs: Results:       Chemistry Recent Labs     05/27/22  0941 05/26/22  0431 05/25/22  0912   * 97 100*    141 141   K 3.9 4.3 4.6    103 102   CO2 31 33* 30   BUN 11 12 31*   CREA 3.78* 4.04* 6.72*   CA 9.5 9.2 9.1   AGAP 7 5 9   BUCR 3* 3* 5*   AP PENDING 88 95   TP PENDING 6.5 6.6   ALB 3.3* 2.9* 3.3*   GLOB PENDING 3.6 3.3   AGRAT PENDING 0.8 1.0      CBC w/Diff Recent Labs     05/27/22  0941 05/26/22  0431 05/25/22  0912   WBC 6.7 3.9* 5.0   RBC 3.17* 2.67* 2.78*   HGB 10.3* 8.9* 9.1*   HCT 33.4* 27.8* 28.8*    218 216   GRANS 51 48 61   LYMPH 35 36 23   EOS 3 3 2      Coagulation No results for input(s): PTP, INR, APTT, INREXT, INREXT in the last 72 hours. Iron/Ferritin No results for input(s): IRON in the last 72 hours. No lab exists for component: TIBCCALC   BNP No results for input(s): BNPP in the last 72 hours. Cardiac Enzymes No results for input(s): CPK, CKND1, PATTI in the last 72 hours.     No lab exists for component: CKRMB, TROIP   Liver Enzymes Recent Labs     05/27/22  0941   TP PENDING   ALB 3.3*   AP PENDING      Thyroid Studies Lab Results   Component Value Date/Time    TSH 0.60 05/25/2022 09:12 AM         EKG: afib with rvr     Physical Assessment:     Visit Vitals  /60   Pulse 62   Temp 97.1 °F (36.2 °C) (Temporal)   Resp 16   Ht 5' 2\" (1.575 m)   Wt 96.2 kg (212 lb)   SpO2 100%   BMI 38.78 kg/m²     Weight change: 5.163 kg (11 lb 6.1 oz)    Intake/Output Summary (Last 24 hours) at 5/27/2022 1048  Last data filed at 5/27/2022 3893  Gross per 24 hour   Intake 120 ml   Output 1000 ml   Net -880 ml     Physical Exam:   General: comfortable, no acute distress   HEENT sclera anicteric, supple neck, no thyromegaly  CVS: S1S2 heard,  no rub  RS: + air entry b/l,   Abd: Soft, Non tender, Not distended  Neuro: non focal, awake, alert , CN II-XII are grossly intact  Extrm: no edema, no cyanosis, clubbing   Skin: no visible  Rash  Musculoskeletal: No gross joints or bone deformities     Procedures/imaging: see electronic medical records for all procedures, Xrays and details which were not copied into this note but were reviewed prior to creation of Moise Bailey MD  May 27, 2022  [facility]  Office [phone]

## 2022-05-28 NOTE — DISCHARGE SUMMARY
Discharge Summary  [facility]      Patient: Keara Waggoner MRN: [id number]  CSN: [id number]    YOB: 1945  Age: 64 y.o. Sex: female      Admission Date: 5/25/2022 Discharge Date: may 27 2022    Attending: No att. providers found PCP: Dorina Hayes MD     Reason for Admission:   ESRD on dialysis [facility]) [N18.6, Z99.2]  SOB (shortness of breath) on exertion [R06.02]    Discharge Diagnoses:   Weakness  Dyspnea on exertion   ESRD on HD (MWF)  Paroxysmal afib  Chronic hypotension  Hypothyroidism  DM II with neuropathy  Depression  Gout  Osteopenia w/ hx of closed L pelvic fx (Nov 2021)    Important notes to PCP/ follow-up studies and evaluations   -ensure patient is compliant with midodrine ( 5 mg TID)   -ensure patient is taking correct dose of gabapentin 100 mg nightly   -per cardiology,Will arrange for outpatient CATINA for pre procedural watchman planning  -Plan for patient to follow up with Dr. Tiney Kayser in 4 weeks  - per pulm - Will f/u in clinic for PFTs, sleep study. May need high-resolution CT chest to r/o early ILD. If negative, dyspnea is likely 2/2 deconditioning or possibly postural hypotension. She may benefit from cardiopulmonary rehab or intense PT/OT program.  - HD MWF   -Heart cath findings 5/25: no obstructive CAD. RHC  With mildly elevated filling pressures and mild pulmonary HTN, normal CO. Pending labs and studies:  none    Operative Procedures:   LEFT AND RIGHT HEART CATH / CORONARY ANGIOGRAPHY    Discharge Medications:     Discharge Medication List as of 5/27/2022  2:46 PM      CONTINUE these medications which have CHANGED    Details   gabapentin (NEURONTIN) 100 mg capsule Take 1 Capsule by mouth nightly. Max Daily Amount: 100 mg. Indications: concern for back pain post dialysis, Print, Disp-30 Capsule, R-0      midodrine (PROAMATINE) 5 mg tablet Take 1 Tablet by mouth three (3) times daily (with meals) for 30 days. , Normal, Disp-90 Tablet, R-0         CONTINUE these medications which have NOT CHANGED    Details   doxercalciferol (HECTOROL IV) 5 mcg., Historical Med      ondansetron hcl (ZOFRAN) 4 mg tablet Historical Med      melatonin 5 mg tablet Take 5 mg by mouth nightly., Historical Med      HYDROmorphone (DILAUDID) 2 mg tablet Take 1 mg by mouth every eight (8) hours as needed for Pain. Take 1/2 tablet by mouth every 8 hours as needed for pain level of 5 out of 10 or greater, Historical Med      allopurinoL (ZYLOPRIM) 100 mg tablet Take 1 Tablet by mouth every Monday, Wednesday, Friday. [after hemodialysis]  Indications: treatment to prevent acute gout attack, Normal, Disp-12 Tablet, R-0      amiodarone (CORDARONE) 200 mg tablet Take 1 Tablet by mouth daily. Indications: prevention of recurrent atrial fibrillation, Normal, Disp-30 Tablet, R-0      sevelamer carbonate (RENVELA) 800 mg tab tab Take 2 Tablets by mouth three (3) times daily (with meals). Indications: renal osteodystrophy with hyperphosphatemia, Normal, Disp-180 Tablet, R-0      sodium zirconium cyclosilicate (LOKELMA) 5 gram powder packet Take 1 Packet by mouth daily. Indications: high levels of potassium in the blood, Normal, Disp-30 Packet, R-0      acetaminophen (Tylenol Extra Strength) 500 mg tablet Take 1 Tablet by mouth every six (6) hours as needed for Pain., Normal, Disp-30 Tablet, R-0      lidocaine (LIDODERM) 5 % Apply patch to the affected area for 12 hours a day and remove for 12 hours a day., Normal, Disp-1 Each, R-0      apixaban (ELIQUIS) 5 mg tablet Take 1 Tablet by mouth two (2) times a day., Print, Disp-60 Tablet, R-0      meclizine (ANTIVERT) 25 mg tablet Take 25 mg by mouth three (3) times daily as needed for Dizziness. , Historical Med      methoxy peg-epoetin beta (MIRCERA INJECTION) 30 mcg., Historical Med      DULoxetine (Cymbalta) 20 mg capsule Take 20 mg by mouth daily. , Historical Med      estradioL (Estrace) 0.01 % (0.1 mg/gram) vaginal cream Apply a fingertip amount around the urethra three times a week., Normal, Disp-30 g,R-3      biotin 1,000 mcg chew Take 1 Tab by mouth daily. , Historical Med      cyanocobalamin 1,000 mcg tablet Take 1,000 mcg by mouth daily. , Historical Med      lactobacillus sp. 50 billion cpu (BIO-K PLUS) 50 billion cell -375 mg cap capsule Take 1 Cap by mouth daily. , Print, Disp-30 Cap, R-2      ascorbic acid, vitamin C, (VITAMIN C) 500 mg tablet Take 500 mg by mouth daily. , Historical Med      calcitRIOL (ROCALTROL) 0.25 mcg capsule Take 0.25 mcg by mouth daily. , Historical Med      cholecalciferol (VITAMIN D3) (2,000 UNITS /50 MCG) cap capsule Take 2,000 Units by mouth two (2) times a day. Take two tabs a total of 4000 units, Historical Med      latanoprost (XALATAN) 0.005 % ophthalmic solution Administer 1 Drop to both eyes nightly. One drop at bedtime, Historical Med      levothyroxine (SYNTHROID) 125 mcg tablet Take 125 mcg by mouth Daily (before breakfast). , Historical Med      omeprazole (PRILOSEC) 20 mg capsule Take 20 mg by mouth daily. , Historical Med      ondansetron (ZOFRAN ODT) 4 mg disintegrating tablet Take 4 mg by mouth every eight (8) hours as needed for Nausea or Vomiting., Historical Med      vit B Cmplx 3-FA-Vit C-Biotin (NEPHRO HOWIE RX) 1- mg-mg-mcg tablet Take 1 Tab by mouth daily. , Historical Med         STOP taking these medications       0.9% sodium chloride solp 100 mL with iron sucrose 20 MG/ML Comments:   Reason for Stopping:         fludrocortisone (FLORINEF) 0.1 mg tablet Comments:   Reason for Stopping:             Disposition: Home with Home Health    Consultants:    pulmonology  cardiology    [address] Course (including pertinent history and physical findings)  64 y. o. female with PMH of paroxysmal afib, ESRD on HD (MWF), HLD,  L hip fx (Nov 21), hypothyroidism, chronic hypotension, DMII (diet controlled) with neuropathy, depression, glaucoma, recurrent UTI's with hx of stones/R ureteral stent, gout, and chronic low back pain, now admitted with unstable angina pending heart cath.     Dyspnea on exertion, weakness  First occurred only at rest, but now exacerbated with minimal activity such as walking around home.    -Scheduled for outpatient cath procedure on 5/29, however symptoms worsen prior to this time, so patient presented at the ED. [place] concern for cardiac etiology and pt taken for cath. -Follows with  outpt.   -ECG 5/25/22 and troponins wnl on admission. -CXR 5/25/22 - wnl.   Heart cath findings 5/25: no obstructive CAD. RHC  With mildly elevated filling pressures and mild pulmonary HTN, normal CO. ECHO 5/25: Normal left ventricular systolic function with a visually estimated EF of 55 - 60%. Left ventricle size is normal. Normal wall thickness. Normal wall motion. Per Dr. Uziel Fermin, cardiology no clear cardiac etiology for patient's significant dyspnea on exertion and weakness.   Plan:  -continued Asa 325mg  - PFT'S and sleep study inIf negative, dyspnea is likely 2/2 deconditioning or possibly postural hypotension. clinic may need high-resolution CT chest to r/o early ILD,  - Orthostatic hypotension testing     Paroxysmal afib  Home meds: amiodarone 200mg, Eliquis 5mg bid  (last took [facility] on 5/24 am)  Plan:              - CONTINUED amiodarone               -resumeD home Eliquis am of 5/26 per cards recs              - needs outpt CATINA prior to watchman device, to be scheduled by    's office - ENSURE PATIENT IS AWARE      ESRD on HD (MWF)  Nephrology consulted in the ED. Dr. Alesia Walls would like to do additional dialysis 5/26/22      Chronic hypotension  -CHANGED Midodrine  To 5 mg TID     Hypothyroidism  TSH 1.75 in March 2021. Plan:              -continued home levothyroxine 125mcg     DM II with neuropathy  HbA1c 4.9% on 5/19/22.  Diet controlled  Plan:              - CHANGED Gabapentin 100mg once nightly      Depression  Continued Duloxetine 20mg.     Gout  -Continued Allopurinol 100mg MWF.     Osteopenia w/ hx of closed L pelvic fx (Nov 2021)  Pt was admitted to [facility] in Nov 2021 for multiple closed PV fx's that did not require surgery, but pt went to ARU  Dexa scan with t score -1.5 in March 2022. Vit D    CURRENT ADMISSION IMAGING RESULTS   XR CHEST PORT    Result Date: 5/25/2022   IMPRESSION: 1. No acute cardiopulmonary process. Cardiology Procedures/Testing:  MODALITY RESULTS   EKG Results for orders placed or performed during the hospital encounter of 05/25/22   EKG, 12 LEAD, INITIAL   Result Value Ref Range    Ventricular Rate 66 BPM    Atrial Rate 71 BPM    QRS Duration 96 ms    Q-T Interval 428 ms    QTC Calculation (Bezet) 448 ms    Calculated R Axis -26 degrees    Calculated T Axis 31 degrees    Diagnosis       Sinus rhythm with low amplitude p-waves  Left axis deviation      Confirmed by Suzanne Benavides MD, Gianfranco Mcintyre ([id number]) on 5/25/2022 8:12:03 AM         ECHO 05/25/22    ECHO ADULT FOLLOW-UP OR LIMITED 05/25/2022 5/25/2022    Interpretation Summary    Left Ventricle: Normal left ventricular systolic function with a visually estimated EF of 55 - 60%. Left ventricle size is normal. Normal wall thickness. Normal wall motion. Signed by: Ivana Hartmann MD on 5/25/2022  3:27 PM     IR Results from [place] encounter on 10/14/20    IR NEPHROSTOMY PERC RT PLC CATH  SI    Impression  IMPRESSION:    Technically successful nephrostogram.    Technically successful fluoroscopy assisted exchange for 8 [place] double-J  catheter, right side. Moderate sedation provided for the procedure, 30 minutes      Results from [place] encounter on 07/15/20    IR EXCHANGE NEPHRO PERC RT SI    Impression  IMPRESSION:    Successful, uncomplicated right diagnostic antegrade nephrostogram with  antegrade nephroureteral stent exchange. Proximal and distal renal collecting  systems completely decompressed. Free flow contrast media in the urinary  bladder. No debris. No hydronephrosis or hydroureter.     Plan: Patient should come back interventional radiology department at  approximately 8 weeks for routine exchange of the right nephroureteral stent. Results from [place] encounter on 04/13/20    IR NEPHROURETERAL PERC RT PLC CATH NEW ACCESS  SI    Impression  IMPRESSION:    Successful uncomplicated right nephrostomy tube removal, high-grade distal right  ureteral stricture recanalization, right ureteral high-grade stricture balloon  ureteroplasty as well as placement of the new right nephroureteral catheter. Plan: Patient will come back to interventional radiology in approximately 4  weeks for detailed antegrade nephrostogram and possible nephroureteral catheter  removal. If stricture persists however internalization with double-J stent will  be considered. CATH 05/25/22    CARDIAC PROCEDURE 05/25/2022 5/25/2022    Conclusion  · No obstructive coronary artery disease. · Mildly elevated filling pressures, mild pulmonary hypertension and normal cardiac output. · Mildly elevated LVEDP. Right radial arterial and right common femoral venous access with 6Fr. Sheaths. Sheaths removed with radial band in place and hemostasis with vascade at [facility]. Wean radial band per protocol. Flat bedrest for 1hr post procedure.     Signed by: Manoj Simmons MD on 5/25/2022  3:31 PM       Special Testing/Procedures:  MODALITY RESULTS   MICRO All Micro Results     None         ABG No results found for: PH, PHI, PCO2, PCO2I, PO2, PO2I, HCO3, HCO3I, FIO2, FIO2I   UA Results for orders placed or performed in visit on 02/19/21   AMB POC URINALYSIS DIP STICK AUTO W/O MICRO     Status: None   Result Value Ref Range Status    Color (UA POC) Dark Yellow  Final     Comment: Milky    Clarity (UA POC) Turbid  Final    Glucose (UA POC) Negative Negative Final    Bilirubin (UA POC) Negative Negative Final    Ketones (UA POC) Negative Negative Final    Specific gravity (UA POC) 1.025 1.001 - 1.035 Final    Blood (UA POC) 3+ Negative Final    pH (UA POC) 7.5 4.6 - 8.0 Final    Protein (UA POC) 3+ Negative Final    Urobilinogen (UA POC) 0.2 mg/dL 0.2 - 1 Final    Nitrites (UA POC) Negative Negative Final    Leukocyte esterase (UA POC) 3+ Negative Final        Laboratory Results:  LABORATORY RESULTS   HEMATOLOGY Lab Results   Component Value Date/Time    WBC 6.7 05/27/2022 09:41 AM    Hemoglobin, POC 8.8 (L) 09/24/2020 08:45 AM    HGB 10.3 (L) 05/27/2022 09:41 AM    Hematocrit, POC 26 (L) 09/24/2020 08:45 AM    HCT 33.4 (L) 05/27/2022 09:41 AM    PLATELET 986 39/94/5348 09:41 AM    .4 (H) 05/27/2022 09:41 AM       CHEMISTRIES Lab Results   Component Value Date/Time    Sodium 139 05/27/2022 09:41 AM    Potassium 3.9 05/27/2022 09:41 AM    Chloride 101 05/27/2022 09:41 AM    CO2 31 05/27/2022 09:41 AM    Anion gap 7 05/27/2022 09:41 AM    Glucose 109 (H) 05/27/2022 09:41 AM    BUN 11 05/27/2022 09:41 AM    Creatinine 3.78 (H) 05/27/2022 09:41 AM    BUN/Creatinine ratio 3 (L) 05/27/2022 09:41 AM    GFR est AA 14 (L) 05/27/2022 09:41 AM    GFR est non-AA 12 (L) 05/27/2022 09:41 AM    Calcium 9.5 05/27/2022 09:41 AM      HEPATIC FUNCTION Lab Results   Component Value Date/Time    Albumin 3.3 (L) 05/27/2022 09:41 AM    Bilirubin, total 0.3 05/27/2022 09:41 AM    Protein, total 7.5 05/27/2022 09:41 AM    Globulin 4.2 (H) 05/27/2022 09:41 AM    A-G Ratio 0.8 05/27/2022 09:41 AM    ALT (SGPT) 20 05/27/2022 09:41 AM    Alk.  phosphatase 114 05/27/2022 09:41 AM       LACTIC ACID Lab Results   Component Value Date/Time    Lactic acid 1.1 05/15/2020 08:29 AM      CARDIAC PANEL Lab Results   Component Value Date/Time    Troponin-I, QT <0.02 11/12/2021 03:20 PM      NT-proBNP Lab Results   Component Value Date/Time    NT pro-BNP 5,643 (H) 10/08/2021 03:50 PM      THYROID Lab Results   Component Value Date/Time    TSH 0.60 05/25/2022 09:12 AM    T4, Free 1.5 05/25/2022 09:12 AM        Functional status and cognitive function:    Ambulates without assistance   Status: alert, cooperative, no distress, appears stated age  Condition: STABLE    Code status and advanced care plan: Full     Jaycee Murillo (Marco)   [phone] [facility] Phone)    Patient Education:  Patient was educated on the following topics prior to discharge: hemodialysis     RISK CALCULATORS:  SCORE RESULT   READMISSION RISK SCORE Medium Risk            14 Total Score    9 IP Visits Last 12 Months (1-3=4, 4=9, >4=11)    5 Pt. Coverage (Medicare=5 , Medicaid, or Self-Pay=4)        Criteria that do not apply:    Has Seen PCP in Last 6 Months (Yes=3, No=0)    . Living with Significant Other. Assisted Living. LTAC. SNF.  or   Rehab    Patient Length of Stay (>5 days = 3)    Charlson Comorbidity Score (Age + Comorbid Conditions)         Follow-up:   Follow-up Information     Follow up With Specialties Details Why Contact Info    [facility]  On 5/31/2022  [address] [facility] [address]    Antoni Reeves MD [facility]   [facility] [address]      [facility], [facility])   [address]  [address] [facility] [address]        Mellissa Yousif MD, PGY-1   [address] [facility]   May 27 2022 5:22 PM

## 2022-05-29 ENCOUNTER — HOME CARE VISIT (OUTPATIENT)
Dept: HOME HEALTH SERVICES | Facility: HOME HEALTH | Age: 77
End: 2022-05-29

## 2022-05-31 ENCOUNTER — TELEPHONE (OUTPATIENT)
Dept: CARDIOLOGY CLINIC | Age: 77
End: 2022-05-31

## 2022-05-31 ENCOUNTER — DOCUMENTATION ONLY (OUTPATIENT)
Dept: PULMONOLOGY | Age: 77
End: 2022-05-31

## 2022-05-31 ENCOUNTER — HOME CARE VISIT (OUTPATIENT)
Dept: HOME HEALTH SERVICES | Facility: HOME HEALTH | Age: 77
End: 2022-05-31

## 2022-05-31 NOTE — PROGRESS NOTES
Lf vm for pt to make hosp fu with Dr. Leandro Cortez in [facility] office in 2-4 wks from [facility] 41 did consult in

## 2022-06-02 ENCOUNTER — TRANSCRIBE ORDER (OUTPATIENT)
Dept: CARDIAC CATH/INVASIVE PROCEDURES | Age: 77
End: 2022-06-02

## 2022-06-02 ENCOUNTER — HOME CARE VISIT (OUTPATIENT)
Dept: SCHEDULING | Facility: HOME HEALTH | Age: 77
End: 2022-06-02
Payer: MEDICARE

## 2022-06-02 ENCOUNTER — HOME CARE VISIT (OUTPATIENT)
Dept: HOME HEALTH SERVICES | Facility: HOME HEALTH | Age: 77
End: 2022-06-02

## 2022-06-02 ENCOUNTER — TELEPHONE (OUTPATIENT)
Dept: CARDIOLOGY CLINIC | Age: 77
End: 2022-06-02

## 2022-06-02 VITALS
HEART RATE: 72 BPM | SYSTOLIC BLOOD PRESSURE: 98 MMHG | TEMPERATURE: 97.8 F | DIASTOLIC BLOOD PRESSURE: 50 MMHG | RESPIRATION RATE: 18 BRPM | OXYGEN SATURATION: 98 %

## 2022-06-02 VITALS
RESPIRATION RATE: 16 BRPM | SYSTOLIC BLOOD PRESSURE: 104 MMHG | HEART RATE: 74 BPM | TEMPERATURE: 97.8 F | DIASTOLIC BLOOD PRESSURE: 54 MMHG | OXYGEN SATURATION: 97 %

## 2022-06-02 DIAGNOSIS — I48.91 ATRIAL FIBRILLATION (HCC): Primary | ICD-10-CM

## 2022-06-02 PROCEDURE — G0151 HHCP-SERV OF PT,EA 15 MIN: HCPCS

## 2022-06-02 PROCEDURE — 400013 HH SOC

## 2022-06-02 PROCEDURE — G0299 HHS/HOSPICE OF RN EA 15 MIN: HCPCS

## 2022-06-02 RX ORDER — SODIUM CHLORIDE 0.9 % (FLUSH) 0.9 %
5-40 SYRINGE (ML) INJECTION EVERY 8 HOURS
Status: CANCELLED | OUTPATIENT
Start: 2022-06-02

## 2022-06-02 RX ORDER — SODIUM CHLORIDE 0.9 % (FLUSH) 0.9 %
5-40 SYRINGE (ML) INJECTION AS NEEDED
Status: CANCELLED | OUTPATIENT
Start: 2022-06-02

## 2022-06-02 NOTE — Clinical Note
Therapy Functional Score Assessment  Question                                  Score   Grooming                    2       Upper Dressing           2      Lower Dressing           2      Bathing                        3      Toilet Transfer            1    Transfer                      2            Ambulation                  3   Dyspnea                     3       Pain Interfering with activity   3  Est number therapy visits      9

## 2022-06-02 NOTE — TELEPHONE ENCOUNTER
----- Message from Kole Hammer sent at 6/2/2022 11:28 AM EDT -----  Regarding: RE: CATINA  Patient is scheduled on 6/8 @10:30 for CATINA. [employer] not sure who is doing the instructions.  ----- Message -----  From: Joe Diana MD  Sent: 5/25/2022   2:28 PM EDT  To: Christos Dexter RN, Katya Hammer, #  Subject: CATINA                                              Can we please schedule her to have a CATINA with me as an outpatient in 2-3weeks. Eliquis can be continued and does not need to be stopped. CATINA reason is for pre-watchman procedure planning.      Thank you,  Yeni

## 2022-06-02 NOTE — Clinical Note
Patient admitted to [facility] today for disease management/education, med management/education with the following SN visit set, 1w1, 2w1, 1w2, 2prn. Thank you.

## 2022-06-02 NOTE — TELEPHONE ENCOUNTER
Instructions    Patients Name:  Kendra Combs    1. You are scheduled to have a CATINA  on 6/08/22  at 1030 am Please check in at 0900 am  .     2. Please go to [facility] and park in the outpatient parking lot that is located around to the back of the hospital and enter through the [facility] building. Once you enter through the [facility] check in with the  there. The  will either give you directions or assist you in getting to the cath holding area. 3. You are not to eat or drink anything after midnight the night before your procedure. Small sips of water to take your medications is ok. 4. If you are diabetic, do not take your insulin/sugar pill the morning of the procedure. 5. MEDICATION INSTRUCTIONS:   Please take your morning medications with the following special instructions:    [x]          Please make sure to take your Blood pressure medication : Continue your medications    6. We encourage families to wait in the waiting room on the first floor while the procedure is being done. The Doctor will come out and talk with you as soon as the procedure is over. 7. There is the possibility that you may spend the night in the hospital, depending on the results of the procedure. This will be determined after the procedure is done. If angioplasty or stent is planned, you will stay at least one day. 8. If you or your family have any questions, please call our office Monday -Friday, 9:00 a.m.-4:30 p.m.,  At [phone], and ask to speak to one of the nurses.

## 2022-06-02 NOTE — Clinical Note
PMHx:  Anemia in end-stage renal disease ([facility] [address]      Anticoagulated by anticoagulation treatment        On Apixaban    Chronic hypotension        On Midodrine    Chronic pain      Closed fracture of left inferior pubic ramus, with routine heali ng, subsequent encounter 11/12/2021    Closed fracture of superior pubic ramus, left, with routine healing, subsequent encounter 11/12/2021    Closed nondisplaced fracture of anterior wall of left acetabulum with routine healing 11/12/2021    Depression      End-stage renal disease on hemodialysis ([facility] [address]        HD at [facility] on [facility] on [facility].  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      Urine culture (collected 10/8/2021, resulted 10/14/2021) yielded growth of >100,000 colonies/ml of Enterococcus faecalis RESISTANT to Ciprofloxacin, Levofloxacin, Tetracycline and Vancomycin    History of kidney stones      History of recurrent urinary tract infection      History o f sepsis 6/18/2021    History of septic shock 10/8/2021    History of urethral stricture      History of urinary tract infection 10/8/2021      Urine culture (collected 10/8/2021, resulted 10/14/2021) yielded growth of >100,000 colonies/ml of Enterococcus faecalis RESISTANT to Ciprofloxacin, Levofloxacin, Tetracycline and Vancomycin    Hyperlipidemia      Hyperphosphatemia 11/14/2021    Hypothyroidism      Lung mass     Mononeur opathy        Involving ring finger of left hand    Need for prophylactic isolation 10/8/2021      Urine culture (collected 10/8/2021, resulted 10/14/2021) yielded growth of >100,000 colonies/ml of Enterococcus faecalis RESISTANT to Ciprofloxacin, Levofloxacin, Tetracycline and Vancomycin    Paroxysmal atrial fibrillation (HCC)      Secondary hyperparathyroidism of renal origin [facility])      Type 2 diabetes mellitus with end-stage re nal disease ([facility] [address]        HbA1c (10/8/2021) = 4.6    Uric acid nephrolithiasis      Urinary incontinence   SUBJECTIVE:Pt reports she was having increasing SOB and could barely walk to the bathroom without being exhausted so se went to ED and was admitted to the hospital for 4 days  LIVING SITUATION: Lives with her son and MARTA with 2 large dogs in a 2 story home with 4 steps to enter. Pts bed/bath are on the second level. Pt has a stair lift to reach second floor but has to navigate the steps to enter/exit home  REQUIRES CAREGIVER ASSISTANCE FOR: Pts son and MARTA assist with meals and transportation to appointments  PLOF: MOD I with ambulation with walker in the community. MEDICATIONS REVIEWED AND UPDATED: No changes noted  NEXT MD APPT: Saw PCP 5/31  ROM:WFL; flexed posture d/t chronic back issues  WOUNDS:NA  BED MOBILITY:Mod I taking extra time d/t SOB a fatigue  TRANSFERS:Sup/SBA with sit to stand, takes multiple tries to rise from chair d/t weakness  GAIT: X110ft with FWRW. and sup  Gait characterized by flexed posture with shuffling gait with increasing SOB with increased distances. 2MWT= 86ft  STAIRS:X4 steps with 1 handrail and CGA  BALANCE: Pt scored 14/28 on Tinetti Balance Assessment placing patient at high risk for falls. PATIENT EDUCATION PROVIDED THIS VISIT: safety, HEP, walking, deep breathing, energy conservation and pacing self allowing enough time for rest breaks when performing a task     HEP consisting of:  1. Walking every hour during the day with FWRW   2. STS increasing reps as tolerated. Only able to do 2 on assessmen  3. Deep breathing   Written instructions issued. Patient/caregiver verbalized understanding.    CONTINUED NEED FOR THE FOLLOWING SKILLS: HH PT is medically necessary to address pain, decreased strength,  impaired bed mobility, decreased independence with functional transfers, impaired gait, impaired stair negotiation, and impaired balance in order to improve functional independence, quality of life, return to PLOF, and reduce the risk for falls.   PLAN: Instruct in general strengthening, endurance training, balanc e training for fall prevention and energy conservation techniques to improve ADL tolerance    DISCHARGE PLANNING DISCUSSED: Discharge to self and family under MD supervision once all goals have been met or patient has reached max potential.

## 2022-06-02 NOTE — HOME HEALTH
Skilled Reason for admission/summary of clinical condition:  disease management/education, med management/education. This patient is homebound for the following reasons Requires considerable and taxing effort to leave the home , Requires the assistance of 1 or more persons to leave the home  and Only leaves the home for medical reasons or [employer] services and are infrequent and of short duration for other reasons . Caregiver: relative. Caregiver assists with available for ADLs, meal prep, med management and transportation. Medications reconciled and all medications are not available in the home this visit. Patient has not picked up gabapentin yet. The following education was provided regarding medications: Patient educated on [place] and use for blood pressure support. Educated patient on narcotic side effects which include, drowsiness, dizziness, constipation and nausea. sn instructed patient on eliquis, use as blood thinner and potential for unusual bleeding and bruising. Medications  are too early to assess at this time. High risk medication teaching regarding anticoagulants, hyperglycemic agents or opiod narcotics performed (specify) eliquis, dilaudid. Dr Shazia Hardy not notified of any discrepancies/look a like medications/medication interactions patient stated she would pick it up today. Home health supplies by type and quantity ordered/delivered this visit include: n/a    Patient education provided this visit to include: discussed fall precautions in detail- having lighted hallways, removing throw rugs, monitoring medication that may alter mental status. perform skin inspections looking for any skin breakdown or redness. monitor for edema. frequent position changes. Watch for signs and symptoms of infection which include; fever, drainage, foul smell, lethargy.       Patient level of understanding of education provided: Patient verbalized understanding of all education provided. Sharps Education Provided: Clinician instructed patient/CG on proper disposal of sharps: Containers should be made of hard plastic, be puncture-resistant and leakproof,   such as a laundry detergent or bleach bottle.  When the container is ¾ full, it should be sealed with tape and labeled   DO NOT RECYCLE prior to discarding in the regular trash.      Patient response to procedure performed:  n/a    Home exercise program/Homework provided: sn instructed patient to perform deep breathing exercises, 5-6 breaths 5 x daily to promote air exchange and prevent pneumonia     Pt/Caregiver instructed on plan of care and are agreeable to plan of care at this time. Physician Dr Nestor Baez and Dr Moreno Friend notified of patient admission to home health and plan of care including anticipated frequency of 1w1, 2w1, 1w2, 2prn and treatments/interventions/modalities of disease management/education, med management/education. Discharge planning discussed with patient and caregiver. Discharge planning as follows: When goals are met. Pt is able to manage disease and medication with no difficulty and pt reaches maximum level of function and health condition stable. Pt/Caregiver did verbalize understanding of discharge planning. Next MD appointment 7/5/22 (date) with Nestor Baez MD.  Alon Fer encouraged/instructed to keep appointment as lack of follow through with physician appointment could result in discontinuation of home care services for non-compliance.

## 2022-06-06 ENCOUNTER — HOME CARE VISIT (OUTPATIENT)
Dept: HOME HEALTH SERVICES | Facility: HOME HEALTH | Age: 77
End: 2022-06-06
Payer: MEDICARE

## 2022-06-06 ENCOUNTER — TRANSCRIBE ORDER (OUTPATIENT)
Dept: CARDIAC CATH/INVASIVE PROCEDURES | Age: 77
End: 2022-06-06

## 2022-06-06 DIAGNOSIS — I48.91 A-FIB (HCC): Primary | ICD-10-CM

## 2022-06-07 ENCOUNTER — TRANSCRIBE ORDER (OUTPATIENT)
Dept: CARDIAC CATH/INVASIVE PROCEDURES | Age: 77
End: 2022-06-07

## 2022-06-07 ENCOUNTER — HOME CARE VISIT (OUTPATIENT)
Dept: SCHEDULING | Facility: HOME HEALTH | Age: 77
End: 2022-06-07
Payer: MEDICARE

## 2022-06-07 VITALS
OXYGEN SATURATION: 98 % | RESPIRATION RATE: 18 BRPM | DIASTOLIC BLOOD PRESSURE: 60 MMHG | SYSTOLIC BLOOD PRESSURE: 118 MMHG | HEART RATE: 78 BPM | TEMPERATURE: 97 F

## 2022-06-07 DIAGNOSIS — I48.91 AFIB (HCC): Primary | ICD-10-CM

## 2022-06-07 PROCEDURE — G0157 HHC PT ASSISTANT EA 15: HCPCS

## 2022-06-07 NOTE — HOME HEALTH
PMHx:  Anemia in end-stage renal disease ([facility] [address]      Anticoagulated by anticoagulation treatment        On Apixaban    Chronic hypotension        On Midodrine    Chronic pain      Closed fracture of left inferior pubic ramus, with routine heali ng, subsequent encounter 11/12/2021    Closed fracture of superior pubic ramus, left, with routine healing, subsequent encounter 11/12/2021    Closed nondisplaced fracture of anterior wall of left acetabulum with routine healing 11/12/2021    Depression      End-stage renal disease on hemodialysis ([facility] [address]        HD [facility] on [facility].  Tel # [phone]    Gastroesophageal reflux disease      Glaucoma      History of acute  pyelonephritis 2/20/2020    History of hydronephrosis 10/5/2021    History of infection with vancomycin resistant Enterococcus (VRE) 10/8/2021      Urine culture (collected 10/8/2021, resulted 10/14/2021) yielded growth of >100,000 colonies/ml of Enterococcus faecalis RESISTANT to Ciprofloxacin, Levofloxacin, Tetracycline and Vancomycin    History of kidney stones      History of recurrent urinary tract infection      History o f sepsis 6/18/2021    History of septic shock 10/8/2021    History of urethral stricture      History of urinary tract infection 10/8/2021      Urine culture (collected 10/8/2021, resulted 10/14/2021) yielded growth of >100,000 colonies/ml of Enterococcus faecalis RESISTANT to Ciprofloxacin, Levofloxacin, Tetracycline and Vancomycin    Hyperlipidemia      Hyperphosphatemia 11/14/2021    Hypothyroidism      Lung mass     Mononeur opathy        Involving ring finger of left hand    Need for prophylactic isolation 10/8/2021      Urine culture (collected 10/8/2021, resulted 10/14/2021) yielded growth of >100,000 colonies/ml of Enterococcus faecalis RESISTANT to Ciprofloxacin, Levofloxacin, Tetracycline and Vancomycin    Paroxysmal atrial fibrillation (HCC)      Secondary hyperparathyroidism of renal origin [facility])      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 Pts bed/bath are on the second level. Pt has a stair lift to reach second floor but has to navigate the steps to enter/exit home  REQUIRES CAREGIVER ASSISTANCE FOR: Pts son and MARTA assist with meals and transportation to appointments  PLOF: MOD I with ambulation with walker in the community. MEDICATIONS REVIEWED AND UPDATED: No changes noted  NEXT MD APPT: Saw PCP 5/31  ROM:WFL; flexed posture d/t chronic back issues  WOUNDS:NA  BED MOBILITY:Mod I taking extra time d/t SOB a fatigue  TRANSFERS:Sup/SBA with sit to stand, takes multiple tries to rise from chair d/t weakness  GAIT: X110ft with FWRW. and sup  Gait characterized by flexed posture with shuffling gait with increasing SOB with increased distances. 2MWT= 86ft  STAIRS:X4 steps with 1 handrail and CGA  BALANCE: Pt scored 14/28 on Tinetti Balance Assessment placing patient at high risk for falls. PATIENT EDUCATION PROVIDED THIS VISIT: safety, HEP, walking, deep breathing, energy conservation and pacing self allowing enough time for rest breaks when performing a task     HEP consisting of:  1. Walking every hour during the day with FWRW   2. STS increasing reps as tolerated. Only able to do 2 on assessmen  3. Deep breathing   Written instructions issued. Patient/caregiver verbalized understanding.    CONTINUED NEED FOR THE FOLLOWING SKILLS: HH PT is medically necessary to address pain, decreased strength,  impaired bed mobility, decreased independence with functional transfers, impaired gait, impaired stair negotiation, and impaired balance in order to improve functional independence, quality of life, return to PLOF, and reduce the risk for falls.   PLAN: Instruct in general strengthening, endurance training, balance training for fall prevention and energy conservation techniques to improve ADL tolerance    DISCHARGE PLANNING DISCUSSED: Discharge to self and family under MD supervision once all goals have been met or patient has reached max potential.

## 2022-06-08 ENCOUNTER — ANESTHESIA EVENT (OUTPATIENT)
Dept: NON INVASIVE DIAGNOSTICS | Age: 77
End: 2022-06-08
Payer: MEDICARE

## 2022-06-08 ENCOUNTER — HOSPITAL ENCOUNTER (OUTPATIENT)
Dept: NON INVASIVE DIAGNOSTICS | Age: 77
Discharge: HOME | End: 2022-06-08
Attending: STUDENT IN AN ORGANIZED HEALTH CARE EDUCATION/TRAINING PROGRAM | Admitting: STUDENT IN AN ORGANIZED HEALTH CARE EDUCATION/TRAINING PROGRAM
Payer: MEDICARE

## 2022-06-08 ENCOUNTER — ANESTHESIA (OUTPATIENT)
Dept: NON INVASIVE DIAGNOSTICS | Age: 77
End: 2022-06-08
Payer: MEDICARE

## 2022-06-08 VITALS
HEIGHT: 62 IN | WEIGHT: 212 LBS | DIASTOLIC BLOOD PRESSURE: 36 MMHG | OXYGEN SATURATION: 100 % | HEART RATE: 64 BPM | BODY MASS INDEX: 39.01 KG/M2 | RESPIRATION RATE: 16 BRPM | SYSTOLIC BLOOD PRESSURE: 84 MMHG | TEMPERATURE: 36.9 F

## 2022-06-08 DIAGNOSIS — I48.91 AFIB (HCC): ICD-10-CM

## 2022-06-08 LAB
ANION GAP SERPL CALC-SCNC: 8 MMOL/L (ref 3–18)
BUN SERPL-MCNC: 41 MG/DL (ref 7–18)
BUN/CREAT SERPL: 6 (ref 12–20)
CALCIUM SERPL-MCNC: 8.4 MG/DL (ref 8.5–10.1)
CHLORIDE SERPL-SCNC: 108 MMOL/L (ref 100–111)
CO2 SERPL-SCNC: 26 MMOL/L (ref 21–32)
CREAT SERPL-MCNC: 7.05 MG/DL (ref 0.6–1.3)
GLUCOSE SERPL-MCNC: 97 MG/DL (ref 74–99)
POTASSIUM SERPL-SCNC: 4.7 MMOL/L (ref 3.5–5.5)
SODIUM SERPL-SCNC: 142 MMOL/L (ref 136–145)

## 2022-06-08 PROCEDURE — 01922 ANES N-INVAS IMG/RADJ THER: CPT | Performed by: ANESTHESIOLOGY

## 2022-06-08 PROCEDURE — 96374 THER/PROPH/DIAG INJ IV PUSH: CPT

## 2022-06-08 PROCEDURE — 80048 BASIC METABOLIC PNL TOTAL CA: CPT

## 2022-06-08 PROCEDURE — 74011250636 HC RX REV CODE- 250/636: Performed by: NURSE ANESTHETIST, CERTIFIED REGISTERED

## 2022-06-08 PROCEDURE — 74011000250 HC RX REV CODE- 250: Performed by: NURSE ANESTHETIST, CERTIFIED REGISTERED

## 2022-06-08 PROCEDURE — 99100 ANES PT EXTEME AGE<1 YR&>70: CPT | Performed by: ANESTHESIOLOGY

## 2022-06-08 PROCEDURE — 76060000031 HC ANESTHESIA FIRST 0.5 HR

## 2022-06-08 PROCEDURE — 99100 ANES PT EXTEME AGE<1 YR&>70: CPT | Performed by: NURSE ANESTHETIST, CERTIFIED REGISTERED

## 2022-06-08 PROCEDURE — 01922 ANES N-INVAS IMG/RADJ THER: CPT | Performed by: NURSE ANESTHETIST, CERTIFIED REGISTERED

## 2022-06-08 PROCEDURE — 74011250636 HC RX REV CODE- 250/636: Performed by: STUDENT IN AN ORGANIZED HEALTH CARE EDUCATION/TRAINING PROGRAM

## 2022-06-08 PROCEDURE — 76210000063 HC OR PH I REC FIRST 0.5 HR

## 2022-06-08 RX ORDER — SODIUM CHLORIDE 9 MG/ML
25 INJECTION, SOLUTION INTRAVENOUS CONTINUOUS
Status: DISCONTINUED | OUTPATIENT
Start: 2022-06-08 | End: 2022-06-08 | Stop reason: HOSPADM

## 2022-06-08 RX ORDER — PROPOFOL 10 MG/ML
INJECTION, EMULSION INTRAVENOUS AS NEEDED
Status: DISCONTINUED | OUTPATIENT
Start: 2022-06-08 | End: 2022-06-08 | Stop reason: HOSPADM

## 2022-06-08 RX ORDER — SODIUM CHLORIDE 0.9 % (FLUSH) 0.9 %
5-40 SYRINGE (ML) INJECTION EVERY 8 HOURS
Status: DISCONTINUED | OUTPATIENT
Start: 2022-06-08 | End: 2022-06-08 | Stop reason: HOSPADM

## 2022-06-08 RX ORDER — LIDOCAINE HYDROCHLORIDE 20 MG/ML
INJECTION, SOLUTION EPIDURAL; INFILTRATION; INTRACAUDAL; PERINEURAL AS NEEDED
Status: DISCONTINUED | OUTPATIENT
Start: 2022-06-08 | End: 2022-06-08 | Stop reason: HOSPADM

## 2022-06-08 RX ORDER — SODIUM CHLORIDE 0.9 % (FLUSH) 0.9 %
5-40 SYRINGE (ML) INJECTION AS NEEDED
Status: DISCONTINUED | OUTPATIENT
Start: 2022-06-08 | End: 2022-06-08 | Stop reason: HOSPADM

## 2022-06-08 RX ORDER — SODIUM CHLORIDE 9 MG/ML
20 INJECTION INTRAMUSCULAR; INTRAVENOUS; SUBCUTANEOUS
Status: DISCONTINUED | OUTPATIENT
Start: 2022-06-08 | End: 2022-06-08 | Stop reason: HOSPADM

## 2022-06-08 RX ADMIN — PROPOFOL 20 MG: 10 INJECTION, EMULSION INTRAVENOUS at 14:28

## 2022-06-08 RX ADMIN — SODIUM CHLORIDE: 9 INJECTION, SOLUTION INTRAVENOUS at 14:06

## 2022-06-08 RX ADMIN — PROPOFOL 50 MG: 10 INJECTION, EMULSION INTRAVENOUS at 14:11

## 2022-06-08 RX ADMIN — PROPOFOL 20 MG: 10 INJECTION, EMULSION INTRAVENOUS at 14:23

## 2022-06-08 RX ADMIN — PROPOFOL 20 MG: 10 INJECTION, EMULSION INTRAVENOUS at 14:16

## 2022-06-08 RX ADMIN — LIDOCAINE HYDROCHLORIDE 40 MG: 20 INJECTION, SOLUTION EPIDURAL; INFILTRATION; INTRACAUDAL; PERINEURAL at 14:11

## 2022-06-08 RX ADMIN — PROPOFOL 20 MG: 10 INJECTION, EMULSION INTRAVENOUS at 14:19

## 2022-06-08 RX ADMIN — PROPOFOL 30 MG: 10 INJECTION, EMULSION INTRAVENOUS at 14:13

## 2022-06-08 NOTE — H&P
History and Physical           History and Physical    Patient: Dc Traore MRN: [id number]  SSN: [id number]    YOB: 1945  Age: 64 y.o. Sex: female      Subjective:      Dc Traore is a 64 y.o. female, morbidly obese with past medical history of hypertension, hyperlipidemia, type 2 diabetes, end-stage renal disease on hemodialysis with chronic hypotension on midodrine during dialysis, hypothyroidism on Synthroid, anemia of chronic disease, paroxysmal atrial fibrillation on chronic Eliquis with recent admission for chest pain found to have no obstructive CAD, mildly elevated filling pressures, mild PAH and normal CO. TTE with normal LV function. She is presenting for scheduled CATINA for pre-watchman planning and to assess her valves. Past Medical History:   Diagnosis Date    Anemia in end-stage renal disease ([facility] [address]     Anticoagulated by anticoagulation treatment     On Apixaban    Chronic hypotension     On Midodrine    Chronic pain     Closed fracture of left inferior pubic ramus, with routine healing, subsequent encounter 11/12/2021    Closed fracture of superior pubic ramus, left, with routine healing, subsequent encounter 11/12/2021    Closed nondisplaced fracture of anterior wall of left acetabulum with routine healing 11/12/2021    Depression     End-stage renal disease on hemodialysis ([facility] [address]     HD [facility].  Tel # [phone]    Gastroesophageal reflux disease     Glaucoma     History of acute pyelonephritis 2/20/2020    History of hydronephrosis 10/5/2021    History of infection with vancomycin resistant Enterococcus (VRE) 10/8/2021    Urine culture (collected 10/8/2021, resulted 10/14/2021) yielded growth of >100,000 colonies/ml of Enterococcus faecalis RESISTANT to Ciprofloxacin, Levofloxacin, Tetracycline and Vancomycin    History of kidney stones     History of recurrent urinary tract infection     History of sepsis 6/18/2021    History of septic shock 10/8/2021    History of urethral stricture     History of urinary tract infection 10/8/2021    Urine culture (collected 10/8/2021, resulted 10/14/2021) yielded growth of >100,000 colonies/ml of Enterococcus faecalis RESISTANT to Ciprofloxacin, Levofloxacin, Tetracycline and Vancomycin    Hyperlipidemia     Hyperphosphatemia 11/14/2021    Hypothyroidism     Lung mass     Mononeuropathy     Involving ring finger of left hand    Need for prophylactic isolation 10/8/2021    Urine culture (collected 10/8/2021, resulted 10/14/2021) yielded growth of >100,000 colonies/ml of Enterococcus faecalis RESISTANT to Ciprofloxacin, Levofloxacin, Tetracycline and Vancomycin    Paroxysmal atrial fibrillation (HCC)     Secondary hyperparathyroidism of renal origin ([facility] [address]     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 ([facility] [address]     HbA1c (10/8/2021) = 4.6    Uric acid nephrolithiasis     Urinary incontinence      Past Surgical History:   Procedure Laterality Date    HX APPENDECTOMY      HX CHOLECYSTECTOMY      HX GASTRIC BYPASS      Gastric stapling    HX KNEE ARTHROSCOPY      HX UROLOGICAL      right PCN placement    HX UROLOGICAL  07/23/2018    RIGHT URETEROSCOPY WITH HOLMIUM LASER    IR EXCHANGE NEPHRO PERC LT SI  2/21/2020    IR EXCHANGE NEPHRO PERC RT SI  4/13/2020    IR EXCHANGE NEPHRO PERC RT SI  7/17/2020    IR NEPHROSTOMY PERC RT PLC CATH  SI  10/14/2020    IR NEPHROURETERAL PERC RT PLC CATH NEW ACCESS  SI  4/30/2020    [place] INTRO CATH DIALYSIS CIRCUIT DX ANGRPH FLUOR S&I Left 9/24/2020    FISTULOGRAM LEFT/poss permanent catheter placement performed by Eryn Dexter MD at [facility] CATH LAB    VASCULAR SURGERY PROCEDURE UNLIST      lef AVF      Family History   Problem Relation Age of Onset    Heart Surgery Sister      Social History     Tobacco Use    Smoking status: Never Smoker    Smokeless tobacco: Never Used   Substance Use Topics    Alcohol use: Never      Prior to Admission medications    Medication Sig Start Date End Date Taking? Authorizing Provider   gabapentin (NEURONTIN) 100 mg capsule Take 1 Capsule by mouth nightly. Max Daily Amount: 100 mg. Indications: concern for back pain post dialysis 5/27/22  Yes Jack Jennings MD   midodrine (PROAMATINE) 5 mg tablet Take 1 Tablet by mouth three (3) times daily (with meals) for 30 days. Patient taking differently: Take 2.5 mg by mouth DIALYSIS MON, WED & FRI. 5/27/22 6/26/22 Yes Jack Jennings MD   doxercalciferol (HECTOROL IV) 5 mcg by IntraVENous route. in HD 9/29/21 9/28/22 Yes Provider, Historical   melatonin 5 mg tablet Take 5 mg by mouth nightly. Yes Provider, Historical   allopurinoL (ZYLOPRIM) 100 mg tablet Take 1 Tablet by mouth every Monday, Wednesday, Friday. [after hemodialysis]  Indications: treatment to prevent acute gout attack 12/3/21  Yes Xavier Balnk MD   amiodarone (CORDARONE) 200 mg tablet Take 1 Tablet by mouth daily. Indications: prevention of recurrent atrial fibrillation 12/3/21  Yes Xavier Blank MD   sevelamer carbonate (RENVELA) 800 mg tab tab Take 2 Tablets by mouth three (3) times daily (with meals). Indications: renal osteodystrophy with hyperphosphatemia 12/3/21  Yes Xavier Blank MD   sodium zirconium cyclosilicate (LOKELMA) 5 gram powder packet Take 1 Packet by mouth daily. Indications: high levels of potassium in the blood 12/3/21  Yes Xavier Blank MD   acetaminophen (Tylenol Extra Strength) 500 mg tablet Take 1 Tablet by mouth every six (6) hours as needed for Pain. 11/7/21  Yes Violeta Murray MD   lidocaine (LIDODERM) 5 % Apply patch to the affected area for 12 hours a day and remove for 12 hours a day. 11/7/21  Yes Violeta Murray MD   apixaban (ELIQUIS) 5 mg tablet Take 1 Tablet by mouth two (2) times a day. 10/14/21  Yes Eugenio Amaya MD   methoxy peg-epoetin beta [facility] INJECTION) 30 mcg by IntraVENous route.  in HD 9/20/21 9/19/22 Yes Provider, Historical   DULoxetine (Cymbalta) 20 mg capsule Take 20 mg by mouth daily. Yes Provider, Historical   estradioL (Estrace) 0.01 % (0.1 mg/gram) vaginal cream Apply a fingertip amount around the urethra three times a week. 9/30/20  Yes David Short MD   biotin 1,000 mcg chew Take 1 Tab by mouth daily. Yes Provider, Historical   cyanocobalamin 1,000 mcg tablet Take 1,000 mcg by mouth daily. Yes Provider, Historical   lactobacillus sp. 50 billion cpu (BIO-K PLUS) 50 billion cell -375 mg cap capsule Take 1 Cap by mouth daily. 2/25/20  Yes Ceci Herrera MD   ascorbic acid, vitamin C, (VITAMIN C) 500 mg tablet Take 500 mg by mouth daily. Yes Other, MD [facility]   calcitRIOL (ROCALTROL) 0.25 mcg capsule Take 0.25 mcg by mouth daily. Yes Other, MD [employer]   cholecalciferol (VITAMIN D3) (2,000 UNITS /50 MCG) cap capsule Take 2,000 Units by mouth two (2) times a day. Take two tabs a total of 4000 units   Yes Other, MD [facility]   latanoprost (XALATAN) 0.005 % ophthalmic solution Administer 1 Drop to both eyes nightly. One drop at bedtime   Yes Samira, MD [facility]   levothyroxine (SYNTHROID) 125 mcg tablet Take 125 mcg by mouth Daily (before breakfast). Yes Other, MD [employer]   omeprazole (PRILOSEC) 20 mg capsule Take 20 mg by mouth daily. Yes Other, MD [facility]   vit B Cmplx 3-FA-Vit C-Biotin (NEPHRO HOWIE RX) 1- mg-mg-mcg tablet Take 1 Tab by mouth daily. Yes Other, MD [employer]   ondansetron hcl (ZOFRAN) 4 mg tablet Take 4 mg by mouth every eight (8) hours as needed for Nausea or Vomiting. Patient not taking: Reported on 6/8/2022 3/2/22   Provider, Historical   HYDROmorphone (DILAUDID) 2 mg tablet Take 1 mg by mouth every eight (8) hours as needed for Pain. Take 1/2 tablet by mouth every 8 hours as needed for pain level of 5 out of 10 or greater    Provider, Historical   meclizine (ANTIVERT) 25 mg tablet Take 25 mg by mouth three (3) times daily as needed for Dizziness.   Patient not taking: Reported on 6/8/2022 3/3/21   Provider, Historical ondansetron (ZOFRAN ODT) 4 mg disintegrating tablet Take 4 mg by mouth every eight (8) hours as needed for Nausea or Vomiting. Patient not taking: Reported on 6/8/2022    Other, MD Lexus        Allergies   Allergen Reactions    Albumin, Human 25 % Itching     Headache - severe migraine like, itchy eyes, runny nose    Ciprofloxacin Hives    Cyclopentolate Unknown (comments)    Iron Sucrose Diarrhea    Statins-Hmg-Coa Reductase Inhibitors Other (comments)     Body ache       Review of Systems:  A comprehensive review of systems was negative except for that written in the History of Present Illness. Objective:     Vitals:    06/08/22 0932 06/08/22 0940 06/08/22 1405 06/08/22 1435   BP: 118/60  (!) 136/52 (!) 82/40   Pulse:  64 (!) 58 (!) 59   Resp:  28 20 15   Temp:    (!) 36.9 °F (2.7 °C)   SpO2:  99% 98% 99%   Weight: 96.2 kg (212 lb)      Height: 5' 2\" (1.575 m)           Physical Exam:  General:  Alert, cooperative, no distress, appears stated age. Eyes:  Conjunctivae/corneas clear. PERRL, EOMs intact. Fundi benign   Ears:  Normal TMs and external ear canals both ears. Nose: Nares normal. Septum midline. Mucosa normal. No drainage or sinus tenderness. Mouth/Throat: Lips, mucosa, and tongue normal. Teeth and gums normal.   Neck: Supple, symmetrical, trachea midline, no adenopathy, thyroid: no enlargment/tenderness/nodules, no carotid bruit and no JVD. Back:   Symmetric, no curvature. ROM normal. No CVA tenderness. Lungs:   Clear to auscultation bilaterally. Heart:  Regular rate and rhythm, S1, S2 normal, no murmur, click, rub or gallop. Abdomen:   Soft, non-tender. Bowel sounds normal. No masses,  No organomegaly. Extremities: Extremities normal, atraumatic, no cyanosis or edema. Pulses: 2+ and symmetric all extremities. Skin: Skin color, texture, turgor normal. No rashes or lesions   Lymph nodes: Cervical, supraclavicular, and axillary nodes normal.   Neurologic: CNII-XII intact. Normal strength, sensation and reflexes throughout.        Assessment:     Hospital Problems  Date Reviewed: 5/14/2022    None          Plan:     -Planned CATINA under moderate sedation with anesthesia     Signed By: Gautam Hubbard MD     June 8, 2022

## 2022-06-08 NOTE — ANESTHESIA POSTPROCEDURE EVALUATION
* No procedures listed *. MAC    Anesthesia Post Evaluation      Multimodal analgesia: multimodal analgesia used between 6 hours prior to anesthesia start to PACU discharge  Patient location during evaluation: bedside  Patient participation: complete - patient participated  Level of consciousness: awake  Pain management: adequate  Airway patency: patent  Anesthetic complications: no  Cardiovascular status: stable  Respiratory status: acceptable  Hydration status: acceptable  Post anesthesia nausea and vomiting:  controlled      INITIAL Post-op Vital signs:   Vitals Value Taken Time   /42 06/08/22 1458   Temp     Pulse 58 06/08/22 1504   Resp 18 06/08/22 1504   SpO2 100 % 06/08/22 1504   Vitals shown include unvalidated device data.

## 2022-06-08 NOTE — ANESTHESIA PREPROCEDURE EVALUATION
Anesthetic History   No history of anesthetic complications            Review of Systems / Medical History  Patient summary reviewed, nursing notes reviewed and pertinent labs reviewed    Pulmonary  Within defined limits                 Neuro/Psych   Within defined limits           Cardiovascular            Dysrhythmias : atrial fibrillation  CAD (05/2022 No obstructive coronary artery disease) and hyperlipidemia      Comments: 05/2022 ECHO  estimated EF of 55 - 60%   GI/Hepatic/Renal     GERD    Renal disease: ESRD and dialysis      Comments: HX GASTRIC BYPASS Endo/Other      Hypothyroidism  Obesity and anemia     Other Findings            Physical Exam    Airway  Mallampati: II  TM Distance: 4 - 6 cm  Neck ROM: normal range of motion   Mouth opening: Normal     Cardiovascular    Rhythm: irregular           Dental    Dentition: Poor dentition     Pulmonary  Breath sounds clear to auscultation               Abdominal  GI exam deferred       Other Findings            Anesthetic Plan    ASA: 4  Anesthesia type: MAC            Anesthetic plan and risks discussed with: Patient

## 2022-06-08 NOTE — HOME HEALTH
SUBJECTIVE: When I first got up this morning my back was hurting, I took a Tylenol and that helps. Dialysis is going pretty good. I've been up moving around a lot today. I would really like to get out the house and start walk. \"     Caregiver involvement: Pt's daughter in law assists w/ meals, medications, and transportation needs. Medications reconciled and all medications are available in the home this visit. The following education was provided regarding medications, medication interactions, and look a like medications: Continue all medications as prescribed. Medications  are effective at this time. Home health supplies by type and quantity ordered/delivered this visit include: none    PATIENT EDUCATION provided this visit: Energy Conservation - pacing activity, use of AD at all times when outdoor walking the dog, and have a cg close by/present for emergencies. Fall Prevention - see interventions    PATIENT/CG RESPONSE TO EDUCATION: Pt verbalized understanding. OBJECTIVES:  See Care Plan Interventions    ASSESSMENT/PATIENT RESPONSE TO TREATMENT/PROGRESS TOWARDS GOALS:  Family has installed a stair lift to access 2nd level of the home d/t reports of \"feeling her heart fall out of her chest\" when navigating stairs to 2nd level of home. Vitals remained w/I normal parameters w/ good cardiopulmonary return post progression towards ambulation STG >500' today. Pt is at risk for falls due to recent hospitalization, medical dx and increased fatigue w/ functional mobility in the home and out for appts/walking dog. Pt's has rescheduled her Wednesday HD appt to Thursday morning, and is unsure if she will be available for f/u PT appt, with possibility of a missed visit. She would benefit from continued skilled interventions to establish HEP, improve dynamic balance, functional strength, and activity tolerance restore (I) in the home and community w/ ADLs/IADLs, and fall prevention.      Continued need for the following skills: HH Physical Therapy    PLAN: Provide copy of and review HEP, continue gait training outdoors (weather permitted), and review progress towards 5x sit to stand, Tinetti, TUG test.     The following discharge planning was discussed with the pt/caregiver: HHPT frequency 1w1, 2w4 w/ planned sup visit on 6/23  and DC on 6/30 to self, family, and under MD supervision when goals met, or max benefits achieved. - pt/cg verbalized understanding.

## 2022-06-08 NOTE — BRIEF OP NOTE
Brief Postoperative Note    Patient: Kendra Combs  YOB: 1945  MRN: [id number]    Date of Procedure: * No dates entered *     Pre-Op Diagnosis: * No pre-op diagnosis entered *    Post-Op Diagnosis: Same as preoperative diagnosis. * No procedures listed *    * No surgeons listed *    Surgical Assistant: None    Anesthesia: * No anesthesia type entered *     Estimated Blood Loss (mL): Minimal    Complications: None    Specimens: * No specimens in log *     Implants: * No implants in log *    Drains:   [REMOVED] Nephrostomy Tube 05/18/20 (Removed)       [REMOVED] Nephroureteral Drain 07/17/20 Right Ureter (Removed)       [REMOVED] Nephroureteral Drain 10/14/20 Right Ureter (Removed)       Findings: Mild MR and TR. Overall preserved LV function grossly. Watchman measurements detailed in full report. No GOLD thrombus.      Electronically Signed by Bossman Smith MD on 6/8/2022 at 2:31 PM

## 2022-06-08 NOTE — DISCHARGE INSTRUCTIONS
Patient Education     DISCHARGE SUMMARY from Nurse    PATIENT INSTRUCTIONS:    After general anesthesia or intravenous sedation, for 24 hours or while taking prescription Narcotics:  · Limit your activities  · Do not drive and operate hazardous machinery  · Do not make important personal or business decisions  · Do  not drink alcoholic beverages  · If you have not urinated within 8 hours after discharge, please contact your surgeon on call. Report the following to your surgeon:  · Excessive pain, swelling, redness or odor of or around the surgical area  · Temperature over 100.5  · Nausea and vomiting lasting longer than 4 hours or if unable to take medications  · Any signs of decreased circulation or nerve impairment to extremity: change in color, persistent  numbness, tingling, coldness or increase pain  · Any questions    What to do at Home:  Recommended activity: Activity as tolerated and no driving for today. If you experience any of the following symptoms dizziness, fainting, breathing difficulty, difficulty sweallowing, please follow up with Dr. Isacc Sheppard MD.    *  Please give a list of your current medications to your Primary Care Provider. *  Please update this list whenever your medications are discontinued, doses are      changed, or new medications (including over-the-counter products) are added. *  Please carry medication information at all times in case of emergency situations. These are general instructions for a healthy lifestyle:    No smoking/ No tobacco products/ Avoid exposure to second hand smoke  Surgeon General's Warning:  Quitting smoking now greatly reduces serious risk to your health.     Obesity, smoking, and sedentary lifestyle greatly increases your risk for illness    A healthy diet, regular physical exercise & weight monitoring are important for maintaining a healthy lifestyle    You may be retaining fluid if you have a history of heart failure or if you experience any of the following symptoms:  Weight gain of 3 pounds or more overnight or 5 pounds in a week, increased swelling in our hands or feet or shortness of breath while lying flat in bed. Please call your doctor as soon as you notice any of these symptoms; do not wait until your next office visit. The discharge information has been reviewed with the patient. The patient verbalized understanding. Discharge medications reviewed with the patient and appropriate educational materials and side effects teaching were provided. ___________________________________________________________________________________________________________________________________     Transesophageal Echocardiogram: What to Expect at [address] [place] [address]  A transesophageal echocardiogram is a test to help your doctor look at the inside of your heart. A small device called a transducer directs sound waves toward your heart. The sound waves make a picture of the heart's valves and chambers. Before the test, your throat was sprayed with medicine to numb it. Your throat may be sore for a few days. You may have had a sedative to help you relax. You may be unsteady after having sedation. It can take a few hours for the medicine's effects to wear off. Common side effects include nausea, vomiting, and feeling sleepy or tired. This care sheet gives you a general idea about how long it will take for you to recover. But each person recovers at a different pace. Follow the steps below to feel better as quickly as possible. How can you care for yourself at home? Activity    · If a sedative was used, your doctor will tell you when it is safe for you to do your normal activities.     · For your safety, do not drive or operate any machinery that could be dangerous. Wait until the medicine wears off and you can think clearly and react easily.    Diet    · Do not eat or drink until the numbness in your throat wears off.     · When the numbness is gone, you can eat your normal diet.     · Throat lozenges and warm saltwater gargles can help relieve throat soreness. Throat lozenges can be used by people age 3 or older. And most people can gargle at age 6 and older.     · Do not drink alcohol for 24 hours. Follow-up care is a key part of your treatment and safety. Be sure to make and go to all appointments, and call your doctor if you are having problems. It's also a good idea to know your test results and keep a list of the medicines you take. When should you call for help? Call 911 anytime you think you may need emergency care. For example, call if:    · Your stools are maroon or very bloody.     · You vomit blood or what looks like coffee grounds. Call your doctor now or seek immediate medical care if:    · You have pain in your chest, belly, or back.     · You have new or worse trouble swallowing.     · You have trouble breathing. Watch closely for changes in your health, and be sure to contact your doctor if you have any problems. Current as of: January 10, 2022               Content Version: 13.2  © 5979-7356 [employer]. Care instructions adapted under license by [employer] (which disclaims liability or warranty for this information). If you have questions about a medical condition or this instruction, always ask your healthcare professional. [address] any warranty or liability for your use of this information. [employer] Activation    Thank you for requesting access to [employer]. Please follow the instructions below to securely access and download your online medical record. [employer] allows you to send messages to your doctor, view your test results, renew your prescriptions, schedule appointments, and more. How Do I Sign Up? 1. In your internet browser, go to [email or web address]. 2. Click on the First Time User? Click Here link in the Sign In box.  You will see the New Member Sign Up page.  3. Enter your [employer] Access Code exactly as it appears below. You will not need to use this code after youve completed the sign-up process. If you do not sign up before the expiration date, you must request a new code. [employer] Access Code: [email or web address]  Expires: 2022  1:37 PM (This is the date your [employer] access code will )    4. Enter the last four digits of your Social Security Number (xxxx) and Date of Birth (mm/dd/yyyy) as indicated and click Submit. You will be taken to the next sign-up page. 5. Create a [employer] ID. This will be your [employer] login ID and cannot be changed, so think of one that is secure and easy to remember. 6. Create a [employer] password. You can change your password at any time. 7. Enter your Password Reset Question and Answer. This can be used at a later time if you forget your password. 8. Enter your e-mail address. You will receive e-mail notification when new information is available in [address] 9. Click Sign Up. You can now view and download portions of your medical record. 10. Click the Download Summary menu link to download a portable copy of your medical information. Additional Information    If you have questions, please visit the Frequently Asked Questions section of the [employer] website at [email or web address] Remember, [employer] is NOT to be used for urgent needs. For medical emergencies, dial 911.     Patient armband removed and shredded

## 2022-06-09 ENCOUNTER — HOME CARE VISIT (OUTPATIENT)
Dept: HOME HEALTH SERVICES | Facility: HOME HEALTH | Age: 77
End: 2022-06-09
Payer: MEDICARE

## 2022-06-10 ENCOUNTER — DOCUMENTATION ONLY (OUTPATIENT)
Dept: CARDIOTHORACIC SURGERY | Age: 77
End: 2022-06-10

## 2022-06-13 ENCOUNTER — HOME CARE VISIT (OUTPATIENT)
Dept: HOME HEALTH SERVICES | Facility: HOME HEALTH | Age: 77
End: 2022-06-13
Payer: MEDICARE

## 2022-06-13 DIAGNOSIS — I48.0 PAROXYSMAL ATRIAL FIBRILLATION (HCC): ICD-10-CM

## 2022-06-14 ENCOUNTER — HOME CARE VISIT (OUTPATIENT)
Dept: SCHEDULING | Facility: HOME HEALTH | Age: 77
End: 2022-06-14
Payer: MEDICARE

## 2022-06-14 VITALS
TEMPERATURE: 98.3 F | OXYGEN SATURATION: 96 % | HEART RATE: 70 BPM | DIASTOLIC BLOOD PRESSURE: 64 MMHG | SYSTOLIC BLOOD PRESSURE: 112 MMHG

## 2022-06-14 VITALS
HEART RATE: 73 BPM | RESPIRATION RATE: 17 BRPM | DIASTOLIC BLOOD PRESSURE: 50 MMHG | SYSTOLIC BLOOD PRESSURE: 102 MMHG | OXYGEN SATURATION: 98 % | TEMPERATURE: 98.3 F

## 2022-06-14 LAB
ECHO TV REGURGITANT MAX VELOCITY: 2.55 M/S
ECHO TV REGURGITANT PEAK GRADIENT: 26 MMHG

## 2022-06-14 PROCEDURE — G0152 HHCP-SERV OF OT,EA 15 MIN: HCPCS

## 2022-06-14 PROCEDURE — G0157 HHC PT ASSISTANT EA 15: HCPCS

## 2022-06-14 PROCEDURE — 93320 DOPPLER ECHO COMPLETE: CPT | Performed by: STUDENT IN AN ORGANIZED HEALTH CARE EDUCATION/TRAINING PROGRAM

## 2022-06-14 PROCEDURE — 93325 DOPPLER ECHO COLOR FLOW MAPG: CPT | Performed by: STUDENT IN AN ORGANIZED HEALTH CARE EDUCATION/TRAINING PROGRAM

## 2022-06-14 PROCEDURE — 93312 ECHO TRANSESOPHAGEAL: CPT | Performed by: STUDENT IN AN ORGANIZED HEALTH CARE EDUCATION/TRAINING PROGRAM

## 2022-06-14 NOTE — HOME HEALTH
Caregiver involvement: Faustino Pulliam and Monica Tran (son and daughter in law) live with pt and provide daily emotional support. Medications reviewed and all medications are available in the home this visit. The following education was provided regarding medications, medication interactions, and look alike medications (specify): Continue as directed MD.    Medications  are effective at this time. Patient education provided this visit: see ADL note    Sharps education provided:  Clinician instructed patient/CG on proper disposal of sharps: Containers should be made of hard plastic, be puncture-resistant and leakproof,   such as a laundry detergent or bleach bottle.  When the container is ¾ full, it should be sealed with tape and labeled   DO NOT RECYCLE prior to discarding in the regular trash.      Patient level of understanding of education provided: see ADL note    Skilled Care Performed this visit: Completed OT evaluation and assessment for safety with ADL and mobility. Patient response to procedure performed:  Ms. Dunia Valerio had a positive response to therapy. She denies having SOB or added weakness during participation of OT eval.    Patient's Progress towards personal goals: Ms. Dunia Valerio has fair rehab potential.  She is pefroming ADL and mobility with S at the [facility] and [facility]; however, daughter in law reports pt noncomlinace with education provided in past therapy visits and is not consistent with use of DME. She states pt has history of increased falls due to noncompliance. Unfortunately, pt declines further OT at this time. Home exercise program: na    Continued need for the following skills: Nursing and Physical Therapy    Discharge Plans:  1w1 with plans to discharge to self. Pt declines further OT.     List of Comorbidities:   Anemia in end-stage renal disease ([facility]   Anticoagulated by anticoagulation treatment  On Apixaban   Chronic hypotension  On Midodrine   Chronic pain   Closed fracture of left inferior pubic ramus, with routine healing, s ubsequent encounter 11/12/2021   Closed fracture of superior pubic ramus, left, with routine healing, subsequent encounter 11/12/2021   Closed nondisplaced fracture of anterior wall of left acetabulum with routine healing 11/12/2021   Depression   End-stage renal disease on hemodialysis ([address]  HD at [facility] on [facility] on MWF.  Tel # [phone]   Gastroesophageal reflux disease   Glaucoma   History of acute pyel onephritis 2/20/2020   History of hydronephrosis 10/5/2021   History of infection with vancomycin resistant Enterococcus (VRE) 10/8/2021  Urine culture (collected 10/8/2021, resulted 10/14/2021) yielded growth of >100,000 colonies/ml of Enterococcus faecalis RESISTANT to Ciprofloxacin, Levofloxacin, Tetracycline and Vancomycin   History of kidney stones   History of recurrent urinary tract infection   History of sep sis 6/18/2021   History of septic shock 10/8/2021   History of urethral stricture   History of urinary tract infection 10/8/2021  Urine culture (collected 10/8/2021, resulted 10/14/2021) yielded growth of >100,000 colonies/ml of Enterococcus faecalis RESISTANT to Ciprofloxacin, Levofloxacin, Tetracycline and Vancomycin   Hyperlipidemia   Hyperphosphatemia 11/14/2021   Hypothyroidism   Lung mass   Mononeuropath y  Involving ring finger of left hand   Need for prophylactic isolation 10/8/2021  Urine culture (collected 10/8/2021, resulted 10/14/2021) yielded growth of >100,000 colonies/ml of Enterococcus faecalis RESISTANT to Ciprofloxacin, Levofloxacin, Tetracycline and Vancomycin   Paroxysmal atrial fibrillation ([address]   Secondary hyperparathyroidism of renal origin ([address]   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 female with PMH paroxysmal afib, ESRD on HD (MWF), HLD, L hip fx (Nov 21), hypothyroidism, chronic hypotension, DMII (diet controlled) with neuropathy, depression, glaucoma, recurrent UTI's with hx of stones/R ureteral stent, gout, and chronic low back pain now presenting with complaint of worsening SOB and weakness. Pt presented to the ED with SOB, chest tightness, and weakness that worsened this past weekend, but prior has been going on for a year. Pt had LHC with possible watchman procedure scheduled for May 31, but due to worsening sx's this past weekend, pt decided to come to the ED today. Last Eliquis was yesterday. She notes that the chest tightness and shortness of breath last for approximately 20 to 30 minutes while she is exerting herself and resolves when she rests. She also notes some diaphores is with these symptoms. She does not take any nitro.

## 2022-06-14 NOTE — Clinical Note
CURRENT MEDICATION: midodrine (PROAMATINE) 5 mg tablet Take 1 Tablet by mouth three (3) times daily (with meals) for 30 days. 5/27/2022 6/26/2022     Per pt report - NEW DIRECTIONS: Take 1 tablet by mouth three (3) times daily, 3 days/week on HD Days (MWF). Due to low BP during HD days. - Changed by  Milly Alvarez MD - Called MD office to verify, however informed by MD did not make change and recommended I call HD center to verify. [facility] HD:   [phone] Spoke to Carson Franco (dietician/ staff not present) who could not verify this change on pt's medication list. She stated Dr. Crow Garner would be in tomorrow and would have them call me back with verification. Please contact me with any concerns or questions.     Thank you,  JOSE Gooden

## 2022-06-15 NOTE — HOME HEALTH
SUBJECTIVE: \"I don't have any pain. I have a hard time reaching up and putting the clothes in the dryer because I get so out of breath. \"     Caregiver involvement: Pt's daughter present today and assists w/ meals and transportation needs prn. She did not participate in therapy visit today. Medications reconciled and all medications are available in the home this visit. The following education was provided regarding medications, medication interactions, and look a like medications: Continue all medications as prescribed. Medications  are effective at this time. Home health supplies by type and quantity ordered/delivered this visit include: none    PATIENT EDUCATION provided this visit: Energy Conservation/Fall Prevention - pacing activity, use of AD at all times when outdoors/indoors walking, have a cg close by or present. Have a chair nearby when doing laundry to take seated rest breaks. Will confirm w/ HD center for BP medication change. PATIENT/CG RESPONSE TO EDUCATION: Pt verbalized understanding. OBJECTIVES:  See Care Plan Interventions    ASSESSMENT/PATIENT RESPONSE TO TREATMENT/PROGRESS TOWARDS GOALS:  Today's visit focused on review towards functional goals d/t missed 2nd visit last week. Progress made on Tinetti Balance/Gait assessment score to 21/28 medium fall risk compared to 14/28 high fall risk. She has deficits w/ dynamic foot, ankle, hip balance strategies w/ dynamic perturbations, instability w/ 360 deg directional change, and use of an AD for safety. TUG test completed in 18 seconds w/ BUE support, progressing towards goal of <15 seconds. Midodrine medication was changed at HD per pt report, and PTA was unable to confirm w/ HD center of changes. Care team was notified, and will attempt later/await phone call from HD center for confirmation.   These scores indicate pt's fall risk status is decreasing (being a medium risk for falls), w/ deficits in dynamic balance and activity tolerance. She is transferring (I) from multiple surfaces in the home but will need reinforcement of fall prevention strategies as she requires cues to use an AD d/t history of multiple falls. Pt would benefit from higher functional balance training, gait mobility outside of home on uneven surfaces to restore safety and (I) for community distances, review/reinforce HEP for carry over and compliance, stair training outside of the home, and fall prevention education. Continued need for the following skills:  Physical Therapy    PLAN: Review HEP (progress to standing balance/strengthening), stairs and outdoor ambulation next visit (monitor Pulse Ox). The following discharge planning was discussed with the pt/caregiver: HHPT frequency 1w1, 2w4 w/ planned sup visit on 6/23 and DC on 6/30 to self, family, and under MD supervision when goals met, or max benefits achieved. - pt/cg verbalized understanding.

## 2022-06-16 ENCOUNTER — HOME CARE VISIT (OUTPATIENT)
Dept: SCHEDULING | Facility: HOME HEALTH | Age: 77
End: 2022-06-16
Payer: MEDICARE

## 2022-06-16 ENCOUNTER — HOME CARE VISIT (OUTPATIENT)
Dept: HOME HEALTH SERVICES | Facility: HOME HEALTH | Age: 77
End: 2022-06-16
Payer: MEDICARE

## 2022-06-16 PROCEDURE — G0157 HHC PT ASSISTANT EA 15: HCPCS

## 2022-06-16 PROCEDURE — G0300 HHS/HOSPICE OF LPN EA 15 MIN: HCPCS

## 2022-06-17 ENCOUNTER — TELEPHONE (OUTPATIENT)
Dept: CARDIOTHORACIC SURGERY | Age: 77
End: 2022-06-17

## 2022-06-17 VITALS
TEMPERATURE: 97.8 F | DIASTOLIC BLOOD PRESSURE: 60 MMHG | RESPIRATION RATE: 16 BRPM | OXYGEN SATURATION: 100 % | HEART RATE: 98 BPM | SYSTOLIC BLOOD PRESSURE: 110 MMHG

## 2022-06-17 NOTE — TELEPHONE ENCOUNTER
[facility]  Cardiovascular & Thoracic Surgery  Structural Heart Program  [facility] [address]  [id number] (I) [phone] (O) [phone]     Watchman Pre-Procedure Instructions     Procedure: Watchman Procedure  Date: 6/27/22  Time: 08:00 AM   Provider: Gurdeep Henry MD  Arrival Time: 06:30 AM   [facility] [address] [place] [address]     Before Your Procedure  ? If you develop flu-like symptoms (body aches, cough, fever, headache), cold,sore throat,flu, fever, infection,test positive for COVID-19 within 10 daysof your procedure, or wake up ill the morning of your procedure, please call [phone] and notify a Cardiac Procedural Nurse. ? Do not eat, drink, or chew gum after 12:00 AM (midnight) the night before your procedure. ? Your doctor may ask you to stop, change how you take, or start new medications before your procedure. ? Please prepare to stay overnight in the hospital.   ? Take a shower or bathe the night before or the morning of your procedure. ?  Take your last dose of Eliquis on Friday 6/24/22. No Eliquis on 6/25, 6/26, or 6/27.     The Day of Your Procedure   ? Please arrive at 06:30 AM (arrival time) on 6/27/22 (procedure date), 1.5 hours before your scheduled procedure time. You will report to the Heart & Vascular Center Entrance (off [address] [place] [address] Road [facility]) located in the back [facility] ([address] [place] [address], [address]). You will check in with the  (to the right of the sliding doors) and wait in the waiting area until you are greeted by a Cardiac Procedural Nurse and escorted back to the pre-procedural area. Call [phone] for directions. ? We make every effort to keep your scheduled time. In case of an unforeseen emergency, there is a chance your procedure could be delayed.  We will inform you on arrival and /or as soon as a procedure delaying emergency occurs, if this is the case. Thank you for your patience and understanding. ? Bring an overnight bag and arrange for a friend or family member to drive you to and take you home from the hospital. Most patients stay in the hospital after their procedure and leave the hospital the next morning. ? The morning of your procedure, take your medications/pre-procedural medications as directed, with a sip of water. ? Patient and visitor parking is located directly in front of the [facility] entrance. ? Wear loose, comfortable clothing appropriate for the procedure. ? Bring an updated medication list (or bring all medications in a clear plastic bag to review with Cardiac Procedural Nurse), copy of your Advance Directive (if you have one), insurance/prescription cards, and photo ID. Leave other valuables at home. ? No makeup, hair spray, perfume, jewelry, or contact lens. If you wear dentures or glasses, please bring a storage container so they can be safely removed prior to your procedure. ? If you are diabetic, please check and record your blood sugar prior to leaving for your procedure. ? Female patients may be asked for a urine specimen upon arrival, if under the age of 48years old. ? If you use a CPAP at night for sleep apnea, bring the CPAP machine with you to the hospital for your procedure and notify your doctor. ? You are allowed one visitor to be present with you in the pre-procedural area (This rule is subject to change based on national CDC guidelines and hospital regulations). No children under the age of 15 are allowed in the pre-procedural area. After Your Procedure   ? You will stay in the hospital for a minimum of one night. Thisis subject to change based on your unique needs and procedure, in efforts to keep you safe. ? After your procedure, you will return to the cardiac pre-procedure area.  During this time, you will remain flat and your nurse will help to keep you safe and comfortable. After several hours, your nurse will get you something to eat and drink. Then, you will be transported to the Cardiac Intensive Care Unit ([phone]) or Cardiovascular Stepdown Unit ([phone]) where you will rest and spend the night. ? At discharge, the nurse will review your instructions, medications, follow-up appointments with you.  ? You may resume normal activities 48 hours after discharge BUT do not lift more than 10 lbs and avoid vigorous physical activityfor 7 days. ? You may shower 48 hours after discharge BUT avoid hot tubs, saunas, pools, and bathsfor 7 days. Keep the site covered with a clean bandage/band aid and avoid lotion, Vaseline, or scrubbing. ? You will need to take antibiotics for the first 6 months after your procedure if you have dental work or procedures. ? Once you return home, call 911 if you experience: shortness of breathnot relieved by rest, chest pain, fever > 101 º F, and swelling/bleeding at your site. ? If you take metformin, you may resume this medication 48 hours after your discharge. If you are prescribed a blood thinning medication, it is important that you take this medication every day as prescribed. Call your Structural Heart Team with any questions [phone] (8:30 AM-4:30 PM M-F). Pt. Requested that documents be mailed to home address in addition to conversation. Home address confirmed and documents mailed, 06/17/22. Son verbalized understanding and had no questions at this time.

## 2022-06-19 VITALS
OXYGEN SATURATION: 95 % | RESPIRATION RATE: 16 BRPM | TEMPERATURE: 99.1 F | SYSTOLIC BLOOD PRESSURE: 136 MMHG | HEART RATE: 77 BPM | DIASTOLIC BLOOD PRESSURE: 70 MMHG

## 2022-06-19 NOTE — HOME HEALTH
SUBJECTIVE: \"I've been doing my exercises, but I can't do the overhead ones. I can sit on the couch now because I can get up from there. I haven't fallen since I last seen you. I have to go in for my procedure next week for Cardiology, but I'm unsure if its the 20 th or the 24 th, so my dialysis schedule may be different. \"     Caregiver involvement: Pt's daughter present today and assists w/ meals and transportation needs prn. She did not participate in therapy visit today. Medications reconciled and all medications are available in the home this visit. The following education was provided regarding medications, medication interactions, and look a like medications: Continue all medications as prescribed. Take all medicaitons to Dentist Appt and Appt w/ Cardiologist for procedure next week to make sure there are no reactions to any medications MD needs to administer. Talk to MD about taking BP medications prior to procedure. Keep Log of BP (pt has wrist cuff) to monitor effectiveness of BP medication changes. Medications are effective at this time. Home health supplies by type and quantity ordered/delivered this visit include: none    PATIENT EDUCATION provided this visit: Medication Reconciliation,Fall Prevention, Pacing Activity/Energy Conservation, AA ROM to R shoulder using cane (ABD, FLEXION)    PATIENT/CG RESPONSE TO EDUCATION: Pt verbalized understanding. OBJECTIVES:  See Care Plan Interventions    ASSESSMENT/PATIENT RESPONSE TO TREATMENT/PROGRESS TOWARDS GOALS:  Pt progressed well w/ outdoor ambulation w/ FWW >500' w/o loss of balance or increased fatigue today. This allows pt to attend MD appts, and short community ambulation with family as needed and complete her own grocery shopping prn. Pt continues to be non-compliant w/ use of AD leaving it behind occasionally during the day, thus recommended keeping multiple AD around the home for reminders.  She was able to progress dynamic standing balance interventions w/o loss of balance, however w/ increased BLE fatigue. Skilled interventions medically necessary to address fall prevention and compliance w/ AD to prevent falls and re-hospitalization, address higher functional balance/neuromuscular re-education, HEP progression, and improving functional (I) w/ ADLs/IADLs. Continued need for the following skills: HH Physical Therapy    PLAN: Continue functional strengthening, outdoor ambulation, and dynamic balance/fall prevention education next visit. The following discharge planning was discussed with the pt/caregiver: HHPT frequency 1w1, 2w4 w/ planned sup visit on 6/23 and DC on 6/30 to self, family, and under MD supervision when goals met, or max benefits achieved. - pt/cg verbalized understanding.

## 2022-06-20 NOTE — HOME HEALTH
Skilled reason for visit: ESRD,A FIB,DM, HTN,Medication Management    Caregiver involvement: Patient is independent at this time. Family/friends are .available should a need arise. Medications reviewed and all medications are available in the home this visit. The following education was provided regarding medications:  Gabapentin medication commonly used for nerve pain most common side effects are dizziness and drowsiness     MD notified of any discrepancies/look a-like medications/medication interactions: n/a  Medications are effective at this time. Home health supplies by type and quantity ordered/delivered this visit include: n/a    Patient education provided this visit: instruct patient/caregiver that atrial fibrillation (A-fib) or atrial flutter is an irregular heartbeat. It reduces your heart's ability to pump blood through your body. A-fib/atrial flutter may come and go, or it may be a long- term condition. In people with A-fib/atrial flutter, the electrical signals that control the heartbeat are abnormal. The top 2 chambers of the heart (there are 4) stop pumping blood as strongly as normal. When this happens, blood clots can form. These clots can cause life-threatening events such as stroke. A-fib/atrial flutter can also cause other conditions such as heart failure if not managed. Signs and symptoms of A-fib/atrial flutter include: A heartbeat that races, pounds, or flutters; weakness, severe tiredness, or confusion; feeling lightheaded, sweaty, dizzy, or faint; shortness of breath or anxiety; chest pain or pressure.       Willi education provided: n/a    Patient level of understanding of education provided: Patient verbalized complete understanding of education provided     Skilled Care Performed this visit: education     Patient response to procedure performed:  n/a    Agency Progress toward goals: GOALS MET    Patient's Progress towards personal goals: GOALS MET     Home exercise program: Continue use of assistive devices to keep safe     Continued need for the following skills: Physical Therapy    Plan for next visit: SN SERENA     Patient and/or caregiver notified and agrees to changes in the Plan of Care YES      The following discharge planning was discussed with the pt/caregiver: when patient reaches goals and medication is managed, and disease processes are understood patient agrees and understand that discharge will take place.

## 2022-06-21 ENCOUNTER — HOME CARE VISIT (OUTPATIENT)
Dept: SCHEDULING | Facility: HOME HEALTH | Age: 77
End: 2022-06-21
Payer: MEDICARE

## 2022-06-21 VITALS
HEART RATE: 78 BPM | DIASTOLIC BLOOD PRESSURE: 80 MMHG | SYSTOLIC BLOOD PRESSURE: 90 MMHG | TEMPERATURE: 98.4 F | OXYGEN SATURATION: 96 %

## 2022-06-21 PROCEDURE — G0157 HHC PT ASSISTANT EA 15: HCPCS

## 2022-06-21 NOTE — HOME HEALTH
SUBJECTIVE: \"I'm having some dizzy spells today. I took my Meclizine and I have to order some more. When I took my pressure it was 148, when I stood it went to 114. It hits me all of a sudden. \"     Caregiver involvement: Pt's daughter present today and assists w/ meals and transportation needs prn. She did not  participate in therapy visit today. Medications reconciled and all medications are available in the home this visit. The following education was provided regarding medications, medication interactions, and look a like medications: Continue all medications as prescribed. alk to MD about taking BP medications prior to procedure. Keep Log of BP (pt has wrist cuff) to monitor effectiveness of BP medication changes. Medications are effective at this time. Home health supplies by type and quantity ordered/delivered this visit include: none     PATIENT EDUCATION provided this visit: Fall Prevention. [facility] reviewed and signed on 6/21 for discharge planned on 6/30. PATIENT/CG RESPONSE TO EDUCATION: Pt verbalized understanding. OBJECTIVES:   See Care Plan Interventions     ASSESSMENT/PATIENT RESPONSE TO TREATMENT/PROGRESS TOWARDS GOALS:   Today's visit focused on documenting fall on 6/18 and notifying MD/Homecare staff, thus no functional training completed. Pt has verbalized understanding of fall prevention strategies, however remains non-compliant not using her AD for gait safety, nor asking for assistance w/ ADLs. Pt presents today in no apparent distress, however w/ cc of dizziness, and possible orthostatic hypotension episode earlier today. Vitals were all w/I normal parameters. Pt will require PFA/Reassessment next visit to address progress towards goals. At this time, pt is compliant w/ HEP, however will require reinforcement of dynamic balance and fall prevention strategies to increase (I) and safety awareness w/ ADLs/IADLs.      Continued need [facility] the following skills[facility] Therapy PLAN: Complete PT Reassessment next visit. The following discharge planning was discussed with the pt/caregiver: HHPT frequency 1w1, 2w4 w/ planned sup visit on 6/23 and DC on 6/30 to self, family, and under MD supervision when goals met, or ma x benefits achieved. - pt/cg verbalized understanding.

## 2022-06-23 ENCOUNTER — HOME CARE VISIT (OUTPATIENT)
Dept: SCHEDULING | Facility: HOME HEALTH | Age: 77
End: 2022-06-23
Payer: MEDICARE

## 2022-06-23 ENCOUNTER — HOME CARE VISIT (OUTPATIENT)
Dept: HOME HEALTH SERVICES | Facility: HOME HEALTH | Age: 77
End: 2022-06-23
Payer: MEDICARE

## 2022-06-23 ENCOUNTER — TELEPHONE (OUTPATIENT)
Dept: CARDIOTHORACIC SURGERY | Age: 77
End: 2022-06-23

## 2022-06-23 VITALS
RESPIRATION RATE: 18 BRPM | HEART RATE: 88 BPM | TEMPERATURE: 98.6 F | SYSTOLIC BLOOD PRESSURE: 118 MMHG | DIASTOLIC BLOOD PRESSURE: 70 MMHG | OXYGEN SATURATION: 96 %

## 2022-06-23 PROCEDURE — G0300 HHS/HOSPICE OF LPN EA 15 MIN: HCPCS

## 2022-06-23 PROCEDURE — G0151 HHCP-SERV OF PT,EA 15 MIN: HCPCS

## 2022-06-23 NOTE — Clinical Note
Patient met all nursing goals and SN discipline discharged completed today, [facility] signed

## 2022-06-23 NOTE — Clinical Note
Thanks  ----- Message -----  From: Leopold Drown, PT  Sent: 6/23/2022   7:07 PM EDT  To: EVELIA Staton PFA assessment completed today. Pt is due to go in for Watchman's procedure on Monday 6/27/22 and per Valorie and pt, pt may need to stay overnight. She is still on target for DC next week, [address] signed and Aleksandra Ding did SN Disc DC today. She may need more time if any changes post procedure but this is not expected. Pt is aware of DC and possible extension only if changes following the procedure.       Thanks  Ankita Hercules

## 2022-06-23 NOTE — TELEPHONE ENCOUNTER
Pt. Called to review pre-procedure instructions for watchman procedure on 6/27/22, previously provided on 6/17/22. Pt. Verbalized understanding and had no questions at this time.

## 2022-06-23 NOTE — HOME HEALTH
PT REASSESSMENT/SUPERVISORY VISIT-  Omar GARCIA present for reassessment   SUBJECTIVE: \"I don't have any injury from fall the other day. \"  Pt described event as noted below in PFA. CAREGIVER ASSISTANCE NEEDED Emily Nim in law or son assists with meals, transportation, mobility, ADLs as needed  MEDICATIONS REVIEWED AND UPDATED: Medications reconciled and all medications are available in the home this visit. The following education was provided regarding medications, medication interactions, and look a like medications: Continue medication as prescribed by MD. Medications are effective at this time. WOUNDS: none - see nursing notes  ROM[de-identified] See strength section for details. BED MOBILITY:see goals section for details  TRANSFERS:   see goals section for details  GAIT TRAINING: see goals section for details  STAIRS: up and down front steps with FWW and close SBA (would only recommend using front steps if has SBA)   BALANCE: Tinetti score 24/28 which indicates moderate risk for falls. PATIENT/CAREGIVER EDUCATION PROVIDED THIS VISIT: Post fall education as noted below, cont with HEP given by LPTA, clearing obstacles off floors and surrounding areas, fall prevention. HEP consisting of:  supine, seated, standing exercise per handouts given  Patient level of understanding of education provided:Verblized compliance with HEP and stated when she can remember she will try to have a device with her at all times. Patient response to treatment:  Pt had no c/o pain throughtout treatment  ASSESSMENT AND PROGRESS TOWARD GOALS:  Pt s/p fall on 6/18/22 with no residual injuries or pain noted. Pt has made steady gains since [facility] and will be ready for DC next week pending no complications from Watchman's procedure on 6/27/22. Pt may stay overnight following procedure.   Pt has shown improvements in : bed mob remains mod indep, transfers improved from SBA to mod indep, and pt ambulated 110' with FWW and SBA on IE and now ambulating up to 12 minutes  (500') on uneven and even terrain with FWW and SBA/CGA. Pt ambulating in home with SBA with cane/FWW depending on what she needs. CONTINUED NEED FOR THE FOLLOWING SKILLS: HH PT is medically necessary to address  pain, decreased ROM, decreased strength, increased swelling, impaired bed mobility, decreased independence with functional transfers, impaired gait, impaired stair negotiation, and impaired balance in order to improve functional independence, quality of life, return to PLOF, reduce the risk for falls, and reduce pain. PLAN: To cont PT for   DISCHARGE PLANNING DISCUSSED: Discharge to self and family under MD supervision once all goals have been met or patient has reached maximum potential.      POST FALL:   Date and Time of Fall: 6/18/22  SOC/[place] Date: 6/2/22  Fall observed by [facility] Staff? NO   Describe Event and Document any re-training or treatment plan modifications indicated:  pt was swiffing her bedroom and was reaching down to  dirt pile and fell forward. Daughter in law heard her fall and assist pt back up   Response to re-training or treatment plan modifications: LPTA has discussed with pt and PT reemphasized to her that she should be having device with her, at all times, and also to not quickly stand up as this may cause BP to drop and cause off balance. Pt verbalized understanding. Assisted Devices used by patient prior to fall: Yes   Was equipment in use at time of fall? (Yes / No) NO   Injury (Yes / No), If yes, describe:  NO   Emergent Care Received: (Yes / No), if yes, describe: NO  Was patient identified as High Risk for falls?  (Yes / No) yes   List Tests Performed, Scores of Tests, and Patient Risk Factors: Tinetti  24/28 - moderate risk for falls, and TUG 18 sec - moderate risk for falls  MD Notified (name and time): Dr Carlos Doherty notified by Casper Newell

## 2022-06-23 NOTE — Clinical Note
Akua Lange ,   TONI assessment completed today. Pt is due to go in for Watchman's procedure on Monday 6/27/22 and per Valorie and pt, pt may need to stay overnight. She is still on target for DC next week, [address] signed and Niki Hennessy did SN Disc DC today. She may need more time if any changes post procedure but this is not expected. Pt is aware of DC and possible extension only if changes following the procedure.       Thanks  Antonino Burnett

## 2022-06-24 VITALS
TEMPERATURE: 98.6 F | HEART RATE: 84 BPM | SYSTOLIC BLOOD PRESSURE: 110 MMHG | OXYGEN SATURATION: 95 % | RESPIRATION RATE: 16 BRPM | DIASTOLIC BLOOD PRESSURE: 60 MMHG

## 2022-06-27 ENCOUNTER — ANESTHESIA (OUTPATIENT)
Dept: CARDIAC CATH/INVASIVE PROCEDURES | Age: 77
DRG: 273 | End: 2022-06-27
Payer: MEDICARE

## 2022-06-27 ENCOUNTER — ANESTHESIA EVENT (OUTPATIENT)
Dept: CARDIAC CATH/INVASIVE PROCEDURES | Age: 77
DRG: 273 | End: 2022-06-27
Payer: MEDICARE

## 2022-06-27 ENCOUNTER — HOME CARE VISIT (OUTPATIENT)
Dept: HOME HEALTH SERVICES | Facility: HOME HEALTH | Age: 77
End: 2022-06-27
Payer: MEDICARE

## 2022-06-27 ENCOUNTER — HOSPITAL ENCOUNTER (INPATIENT)
Age: 77
LOS: 1 days | Discharge: HOME | DRG: 273 | End: 2022-06-28
Attending: STUDENT IN AN ORGANIZED HEALTH CARE EDUCATION/TRAINING PROGRAM | Admitting: FAMILY MEDICINE
Payer: MEDICARE

## 2022-06-27 DIAGNOSIS — I48.0 PAROXYSMAL ATRIAL FIBRILLATION (HCC): ICD-10-CM

## 2022-06-27 PROBLEM — Z95.818 PRESENCE OF WATCHMAN LEFT ATRIAL APPENDAGE CLOSURE DEVICE: Status: ACTIVE | Noted: 2022-01-01

## 2022-06-27 PROBLEM — I48.91 A-FIB (HCC): Status: ACTIVE | Noted: 2022-06-27

## 2022-06-27 LAB
ACT BLD: 289 SECS (ref 79–138)
ANION GAP SERPL CALC-SCNC: 9 MMOL/L (ref 3–18)
BUN SERPL-MCNC: 38 MG/DL (ref 7–18)
BUN/CREAT SERPL: 5 (ref 12–20)
CALCIUM SERPL-MCNC: 9 MG/DL (ref 8.5–10.1)
CALCULATED R AXIS, ECG10: -152 DEGREES
CALCULATED R AXIS, ECG10: -27 DEGREES
CALCULATED T AXIS, ECG11: 145 DEGREES
CALCULATED T AXIS, ECG11: 34 DEGREES
CHLORIDE SERPL-SCNC: 107 MMOL/L (ref 100–111)
CO2 SERPL-SCNC: 27 MMOL/L (ref 21–32)
COVID-19 RAPID TEST, COVR: NOT DETECTED
CREAT SERPL-MCNC: 8.18 MG/DL (ref 0.6–1.3)
DIAGNOSIS, 93000: NORMAL
DIAGNOSIS, 93000: NORMAL
ERYTHROCYTE [DISTWIDTH] IN BLOOD BY AUTOMATED COUNT: 16.6 % (ref 11.6–14.5)
GLUCOSE SERPL-MCNC: 84 MG/DL (ref 74–99)
HCT VFR BLD AUTO: 34.1 % (ref 35–45)
HGB BLD-MCNC: 10.4 G/DL (ref 12–16)
HISTORY CHECKED?,CKHIST: NORMAL
INR PPP: 1.3 (ref 0.8–1.2)
MCH RBC QN AUTO: 31.9 PG (ref 24–34)
MCHC RBC AUTO-ENTMCNC: 30.5 G/DL (ref 31–37)
MCV RBC AUTO: 104.6 FL (ref 78–100)
NRBC # BLD: 0 K/UL (ref 0–0.01)
NRBC BLD-RTO: 0 PER 100 WBC
PLATELET # BLD AUTO: 287 K/UL (ref 135–420)
PMV BLD AUTO: 9.7 FL (ref 9.2–11.8)
POTASSIUM SERPL-SCNC: 5 MMOL/L (ref 3.5–5.5)
PROTHROMBIN TIME: 16.8 SEC (ref 11.5–15.2)
Q-T INTERVAL, ECG07: 434 MS
Q-T INTERVAL, ECG07: 476 MS
QRS DURATION, ECG06: 104 MS
QRS DURATION, ECG06: 98 MS
QTC CALCULATION (BEZET), ECG08: 451 MS
QTC CALCULATION (BEZET), ECG08: 463 MS
RBC # BLD AUTO: 3.26 M/UL (ref 4.2–5.3)
SODIUM SERPL-SCNC: 143 MMOL/L (ref 136–145)
SOURCE, COVRS: NORMAL
VENTRICULAR RATE, ECG03: 57 BPM
VENTRICULAR RATE, ECG03: 65 BPM
WBC # BLD AUTO: 6.9 K/UL (ref 4.6–13.2)

## 2022-06-27 PROCEDURE — 90935 HEMODIALYSIS ONE EVALUATION: CPT

## 2022-06-27 PROCEDURE — 77030013079 HC BLNKT BAIR HGGR 3M -A: Performed by: ANESTHESIOLOGY

## 2022-06-27 PROCEDURE — 85347 COAGULATION TIME ACTIVATED: CPT

## 2022-06-27 PROCEDURE — 33340 PERQ CLSR TCAT L ATR APNDGE: CPT | Performed by: INTERNAL MEDICINE

## 2022-06-27 PROCEDURE — 77030008683 HC TU ET CUF COVD -A: Performed by: ANESTHESIOLOGY

## 2022-06-27 PROCEDURE — 99100 ANES PT EXTEME AGE<1 YR&>70: CPT | Performed by: ANESTHESIOLOGY

## 2022-06-27 PROCEDURE — 93005 ELECTROCARDIOGRAM TRACING: CPT

## 2022-06-27 PROCEDURE — 85347 COAGULATION TIME ACTIVATED: CPT | Performed by: STUDENT IN AN ORGANIZED HEALTH CARE EDUCATION/TRAINING PROGRAM

## 2022-06-27 PROCEDURE — 77030013797 HC KT TRNSDUC PRSSR EDWD -A: Performed by: STUDENT IN AN ORGANIZED HEALTH CARE EDUCATION/TRAINING PROGRAM

## 2022-06-27 PROCEDURE — 33340 PERQ CLSR TCAT L ATR APNDGE: CPT | Performed by: STUDENT IN AN ORGANIZED HEALTH CARE EDUCATION/TRAINING PROGRAM

## 2022-06-27 PROCEDURE — 87635 SARS-COV-2 COVID-19 AMP PRB: CPT

## 2022-06-27 PROCEDURE — 74011000250 HC RX REV CODE- 250: Performed by: ANESTHESIOLOGY

## 2022-06-27 PROCEDURE — B24BZZ4 ULTRASONOGRAPHY OF HEART WITH AORTA, TRANSESOPHAGEAL: ICD-10-PCS | Performed by: STUDENT IN AN ORGANIZED HEALTH CARE EDUCATION/TRAINING PROGRAM

## 2022-06-27 PROCEDURE — 77030002912 HC SUT ETHBND J&J -A: Performed by: STUDENT IN AN ORGANIZED HEALTH CARE EDUCATION/TRAINING PROGRAM

## 2022-06-27 PROCEDURE — C1894 INTRO/SHEATH, NON-LASER: HCPCS | Performed by: STUDENT IN AN ORGANIZED HEALTH CARE EDUCATION/TRAINING PROGRAM

## 2022-06-27 PROCEDURE — 36620 INSERTION CATHETER ARTERY: CPT | Performed by: ANESTHESIOLOGY

## 2022-06-27 PROCEDURE — C1889 IMPLANT/INSERT DEVICE, NOC: HCPCS | Performed by: STUDENT IN AN ORGANIZED HEALTH CARE EDUCATION/TRAINING PROGRAM

## 2022-06-27 PROCEDURE — 86900 BLOOD TYPING SEROLOGIC ABO: CPT

## 2022-06-27 PROCEDURE — 77030018729 HC ELECTRD DEFIB PAD CARD -B: Performed by: STUDENT IN AN ORGANIZED HEALTH CARE EDUCATION/TRAINING PROGRAM

## 2022-06-27 PROCEDURE — 74011000258 HC RX REV CODE- 258: Performed by: ANESTHESIOLOGY

## 2022-06-27 PROCEDURE — 85610 PROTHROMBIN TIME: CPT

## 2022-06-27 PROCEDURE — 77030004558 HC CATH ANGI DX SUPR TORQ CARD -A: Performed by: STUDENT IN AN ORGANIZED HEALTH CARE EDUCATION/TRAINING PROGRAM

## 2022-06-27 PROCEDURE — 74011000636 HC RX REV CODE- 636: Performed by: STUDENT IN AN ORGANIZED HEALTH CARE EDUCATION/TRAINING PROGRAM

## 2022-06-27 PROCEDURE — 74011000250 HC RX REV CODE- 250: Performed by: STUDENT IN AN ORGANIZED HEALTH CARE EDUCATION/TRAINING PROGRAM

## 2022-06-27 PROCEDURE — 74011250637 HC RX REV CODE- 250/637: Performed by: STUDENT IN AN ORGANIZED HEALTH CARE EDUCATION/TRAINING PROGRAM

## 2022-06-27 PROCEDURE — 74011250636 HC RX REV CODE- 250/636: Performed by: ANESTHESIOLOGY

## 2022-06-27 PROCEDURE — 85027 COMPLETE CBC AUTOMATED: CPT

## 2022-06-27 PROCEDURE — 5A1D70Z PERFORMANCE OF URINARY FILTRATION, INTERMITTENT, LESS THAN 6 HOURS PER DAY: ICD-10-PCS | Performed by: INTERNAL MEDICINE

## 2022-06-27 PROCEDURE — 02L73DK OCCLUSION OF LEFT ATRIAL APPENDAGE WITH INTRALUMINAL DEVICE, PERCUTANEOUS APPROACH: ICD-10-PCS | Performed by: STUDENT IN AN ORGANIZED HEALTH CARE EDUCATION/TRAINING PROGRAM

## 2022-06-27 PROCEDURE — 76060000034 HC ANESTHESIA 1.5 TO 2 HR: Performed by: STUDENT IN AN ORGANIZED HEALTH CARE EDUCATION/TRAINING PROGRAM

## 2022-06-27 PROCEDURE — 74011250636 HC RX REV CODE- 250/636: Performed by: STUDENT IN AN ORGANIZED HEALTH CARE EDUCATION/TRAINING PROGRAM

## 2022-06-27 PROCEDURE — 2709999900 HC NON-CHARGEABLE SUPPLY: Performed by: STUDENT IN AN ORGANIZED HEALTH CARE EDUCATION/TRAINING PROGRAM

## 2022-06-27 PROCEDURE — 80048 BASIC METABOLIC PNL TOTAL CA: CPT

## 2022-06-27 PROCEDURE — 03HB33Z INSERTION OF INFUSION DEVICE INTO RIGHT RADIAL ARTERY, PERCUTANEOUS APPROACH: ICD-10-PCS | Performed by: ANESTHESIOLOGY

## 2022-06-27 PROCEDURE — 65620000000 HC RM CCU GENERAL

## 2022-06-27 PROCEDURE — 01926 ANES IVNTL RAD ICR ICAR/AORT: CPT | Performed by: ANESTHESIOLOGY

## 2022-06-27 PROCEDURE — 86923 COMPATIBILITY TEST ELECTRIC: CPT

## 2022-06-27 DEVICE — LEFT ATRIAL APPENDAGE CLOSURE DEVICE WITH DELIVERY SYSTEM
Type: IMPLANTABLE DEVICE | Status: FUNCTIONAL
Brand: WATCHMAN FLX™

## 2022-06-27 RX ORDER — SEVELAMER CARBONATE 800 MG/1
1600 TABLET, FILM COATED ORAL
Status: DISCONTINUED | OUTPATIENT
Start: 2022-06-27 | End: 2022-06-28 | Stop reason: HOSPADM

## 2022-06-27 RX ORDER — SODIUM CHLORIDE 0.9 % (FLUSH) 0.9 %
5-40 SYRINGE (ML) INJECTION EVERY 8 HOURS
Status: DISCONTINUED | OUTPATIENT
Start: 2022-06-27 | End: 2022-06-28 | Stop reason: HOSPADM

## 2022-06-27 RX ORDER — HYDROMORPHONE HYDROCHLORIDE 2 MG/1
2 TABLET ORAL
Status: DISCONTINUED | OUTPATIENT
Start: 2022-06-27 | End: 2022-06-28 | Stop reason: HOSPADM

## 2022-06-27 RX ORDER — EPHEDRINE SULFATE/0.9% NACL/PF 50 MG/5 ML
SYRINGE (ML) INTRAVENOUS AS NEEDED
Status: DISCONTINUED | OUTPATIENT
Start: 2022-06-27 | End: 2022-06-27 | Stop reason: HOSPADM

## 2022-06-27 RX ORDER — HEPARIN SODIUM 200 [USP'U]/100ML
INJECTION, SOLUTION INTRAVENOUS
Status: COMPLETED | OUTPATIENT
Start: 2022-06-27 | End: 2022-06-27

## 2022-06-27 RX ORDER — GLYCOPYRROLATE 0.2 MG/ML
INJECTION INTRAMUSCULAR; INTRAVENOUS AS NEEDED
Status: DISCONTINUED | OUTPATIENT
Start: 2022-06-27 | End: 2022-06-27 | Stop reason: HOSPADM

## 2022-06-27 RX ORDER — DULOXETIN HYDROCHLORIDE 20 MG/1
20 CAPSULE, DELAYED RELEASE ORAL DAILY
Status: DISCONTINUED | OUTPATIENT
Start: 2022-06-28 | End: 2022-06-28 | Stop reason: HOSPADM

## 2022-06-27 RX ORDER — LATANOPROST 50 UG/ML
1 SOLUTION/ DROPS OPHTHALMIC
Status: DISCONTINUED | OUTPATIENT
Start: 2022-06-27 | End: 2022-06-28 | Stop reason: HOSPADM

## 2022-06-27 RX ORDER — ESTRADIOL 0.1 MG/G
1 CREAM VAGINAL
Status: DISCONTINUED | OUTPATIENT
Start: 2022-06-27 | End: 2022-06-28 | Stop reason: HOSPADM

## 2022-06-27 RX ORDER — LIDOCAINE HYDROCHLORIDE 10 MG/ML
INJECTION, SOLUTION EPIDURAL; INFILTRATION; INTRACAUDAL; PERINEURAL AS NEEDED
Status: DISCONTINUED | OUTPATIENT
Start: 2022-06-27 | End: 2022-06-27 | Stop reason: HOSPADM

## 2022-06-27 RX ORDER — SODIUM CHLORIDE 9 MG/ML
INJECTION, SOLUTION INTRAVENOUS
Status: DISCONTINUED | OUTPATIENT
Start: 2022-06-27 | End: 2022-06-27 | Stop reason: HOSPADM

## 2022-06-27 RX ORDER — MECLIZINE HCL 12.5 MG 12.5 MG/1
25 TABLET ORAL
Status: DISCONTINUED | OUTPATIENT
Start: 2022-06-27 | End: 2022-06-28 | Stop reason: HOSPADM

## 2022-06-27 RX ORDER — ACETAMINOPHEN 325 MG/1
650 TABLET ORAL
Status: DISCONTINUED | OUTPATIENT
Start: 2022-06-27 | End: 2022-06-28 | Stop reason: HOSPADM

## 2022-06-27 RX ORDER — ALLOPURINOL 100 MG/1
100 TABLET ORAL
Status: DISCONTINUED | OUTPATIENT
Start: 2022-06-27 | End: 2022-06-28 | Stop reason: HOSPADM

## 2022-06-27 RX ORDER — PANTOPRAZOLE SODIUM 20 MG/1
20 TABLET, DELAYED RELEASE ORAL
Status: DISCONTINUED | OUTPATIENT
Start: 2022-06-28 | End: 2022-06-28 | Stop reason: HOSPADM

## 2022-06-27 RX ORDER — LIDOCAINE HYDROCHLORIDE 20 MG/ML
INJECTION, SOLUTION EPIDURAL; INFILTRATION; INTRACAUDAL; PERINEURAL AS NEEDED
Status: DISCONTINUED | OUTPATIENT
Start: 2022-06-27 | End: 2022-06-27 | Stop reason: HOSPADM

## 2022-06-27 RX ORDER — PROPOFOL 10 MG/ML
INJECTION, EMULSION INTRAVENOUS AS NEEDED
Status: DISCONTINUED | OUTPATIENT
Start: 2022-06-27 | End: 2022-06-27 | Stop reason: HOSPADM

## 2022-06-27 RX ORDER — CEFAZOLIN SODIUM 1 G/3ML
INJECTION, POWDER, FOR SOLUTION INTRAMUSCULAR; INTRAVENOUS AS NEEDED
Status: DISCONTINUED | OUTPATIENT
Start: 2022-06-27 | End: 2022-06-27 | Stop reason: HOSPADM

## 2022-06-27 RX ORDER — SODIUM CHLORIDE 9 MG/ML
250 INJECTION, SOLUTION INTRAVENOUS CONTINUOUS
Status: DISCONTINUED | OUTPATIENT
Start: 2022-06-27 | End: 2022-06-28 | Stop reason: HOSPADM

## 2022-06-27 RX ORDER — MORPHINE SULFATE 2 MG/ML
2 INJECTION, SOLUTION INTRAMUSCULAR; INTRAVENOUS ONCE
Status: COMPLETED | OUTPATIENT
Start: 2022-06-27 | End: 2022-06-27

## 2022-06-27 RX ORDER — GABAPENTIN 100 MG/1
100 CAPSULE ORAL
Status: DISCONTINUED | OUTPATIENT
Start: 2022-06-27 | End: 2022-06-28 | Stop reason: HOSPADM

## 2022-06-27 RX ORDER — MIDODRINE HYDROCHLORIDE 5 MG/1
5 TABLET ORAL
Status: DISCONTINUED | OUTPATIENT
Start: 2022-06-27 | End: 2022-06-28 | Stop reason: HOSPADM

## 2022-06-27 RX ORDER — NEOSTIGMINE METHYLSULFATE 1 MG/ML
INJECTION, SOLUTION INTRAVENOUS AS NEEDED
Status: DISCONTINUED | OUTPATIENT
Start: 2022-06-27 | End: 2022-06-27 | Stop reason: HOSPADM

## 2022-06-27 RX ORDER — ONDANSETRON 2 MG/ML
INJECTION INTRAMUSCULAR; INTRAVENOUS AS NEEDED
Status: DISCONTINUED | OUTPATIENT
Start: 2022-06-27 | End: 2022-06-27 | Stop reason: HOSPADM

## 2022-06-27 RX ORDER — AMIODARONE HYDROCHLORIDE 200 MG/1
200 TABLET ORAL DAILY
Status: DISCONTINUED | OUTPATIENT
Start: 2022-06-28 | End: 2022-06-28 | Stop reason: HOSPADM

## 2022-06-27 RX ORDER — DEXTROSE MONOHYDRATE 100 MG/ML
0-250 INJECTION, SOLUTION INTRAVENOUS AS NEEDED
Status: DISCONTINUED | OUTPATIENT
Start: 2022-06-27 | End: 2022-06-28 | Stop reason: HOSPADM

## 2022-06-27 RX ORDER — HEPARIN SODIUM 1000 [USP'U]/ML
5000 INJECTION, SOLUTION INTRAVENOUS; SUBCUTANEOUS
Status: DISCONTINUED | OUTPATIENT
Start: 2022-06-27 | End: 2022-06-27

## 2022-06-27 RX ORDER — PHENYLEPHRINE HCL IN 0.9% NACL 1 MG/10 ML
SYRINGE (ML) INTRAVENOUS AS NEEDED
Status: DISCONTINUED | OUTPATIENT
Start: 2022-06-27 | End: 2022-06-27 | Stop reason: HOSPADM

## 2022-06-27 RX ORDER — SODIUM CHLORIDE 0.9 % (FLUSH) 0.9 %
5-40 SYRINGE (ML) INJECTION AS NEEDED
Status: DISCONTINUED | OUTPATIENT
Start: 2022-06-27 | End: 2022-06-28 | Stop reason: HOSPADM

## 2022-06-27 RX ORDER — FENTANYL CITRATE 50 UG/ML
INJECTION, SOLUTION INTRAMUSCULAR; INTRAVENOUS AS NEEDED
Status: DISCONTINUED | OUTPATIENT
Start: 2022-06-27 | End: 2022-06-27 | Stop reason: HOSPADM

## 2022-06-27 RX ORDER — FENTANYL CITRATE 50 UG/ML
25 INJECTION, SOLUTION INTRAMUSCULAR; INTRAVENOUS
Status: DISCONTINUED | OUTPATIENT
Start: 2022-06-27 | End: 2022-06-28 | Stop reason: HOSPADM

## 2022-06-27 RX ORDER — LIDOCAINE 4 G/100G
1 PATCH TOPICAL EVERY 24 HOURS
Status: DISCONTINUED | OUTPATIENT
Start: 2022-06-27 | End: 2022-06-28 | Stop reason: HOSPADM

## 2022-06-27 RX ORDER — CHOLECALCIFEROL (VITAMIN D3) 125 MCG
5 CAPSULE ORAL
Status: DISCONTINUED | OUTPATIENT
Start: 2022-06-27 | End: 2022-06-28 | Stop reason: HOSPADM

## 2022-06-27 RX ORDER — DEXAMETHASONE SODIUM PHOSPHATE 4 MG/ML
INJECTION, SOLUTION INTRA-ARTICULAR; INTRALESIONAL; INTRAMUSCULAR; INTRAVENOUS; SOFT TISSUE AS NEEDED
Status: DISCONTINUED | OUTPATIENT
Start: 2022-06-27 | End: 2022-06-27 | Stop reason: HOSPADM

## 2022-06-27 RX ORDER — ONDANSETRON 2 MG/ML
4 INJECTION INTRAMUSCULAR; INTRAVENOUS
Status: ACTIVE | OUTPATIENT
Start: 2022-06-27 | End: 2022-06-28

## 2022-06-27 RX ORDER — DEXTROSE 40 %
15 GEL (GRAM) ORAL AS NEEDED
Status: DISCONTINUED | OUTPATIENT
Start: 2022-06-27 | End: 2022-06-28 | Stop reason: HOSPADM

## 2022-06-27 RX ORDER — IODIXANOL 320 MG/ML
INJECTION, SOLUTION INTRAVASCULAR AS NEEDED
Status: DISCONTINUED | OUTPATIENT
Start: 2022-06-27 | End: 2022-06-27 | Stop reason: HOSPADM

## 2022-06-27 RX ORDER — ROCURONIUM BROMIDE 10 MG/ML
INJECTION, SOLUTION INTRAVENOUS AS NEEDED
Status: DISCONTINUED | OUTPATIENT
Start: 2022-06-27 | End: 2022-06-27 | Stop reason: HOSPADM

## 2022-06-27 RX ORDER — HEPARIN SODIUM 1000 [USP'U]/ML
INJECTION, SOLUTION INTRAVENOUS; SUBCUTANEOUS AS NEEDED
Status: DISCONTINUED | OUTPATIENT
Start: 2022-06-27 | End: 2022-06-27 | Stop reason: HOSPADM

## 2022-06-27 RX ADMIN — Medication 100 MCG: at 08:44

## 2022-06-27 RX ADMIN — Medication 5 MG: at 09:31

## 2022-06-27 RX ADMIN — HEPARIN SODIUM 5000 UNITS: 1000 INJECTION, SOLUTION INTRAVENOUS; SUBCUTANEOUS at 09:18

## 2022-06-27 RX ADMIN — MORPHINE SULFATE 2 MG: 2 INJECTION, SOLUTION INTRAMUSCULAR; INTRAVENOUS at 13:50

## 2022-06-27 RX ADMIN — PROPOFOL 120 MG: 10 INJECTION, EMULSION INTRAVENOUS at 08:23

## 2022-06-27 RX ADMIN — SODIUM CHLORIDE, PRESERVATIVE FREE 10 ML: 5 INJECTION INTRAVENOUS at 22:00

## 2022-06-27 RX ADMIN — LATANOPROST 1 DROP: 50 SOLUTION OPHTHALMIC at 23:30

## 2022-06-27 RX ADMIN — ROCURONIUM BROMIDE 20 MG: 50 INJECTION INTRAVENOUS at 08:24

## 2022-06-27 RX ADMIN — Medication 3 MG: at 09:42

## 2022-06-27 RX ADMIN — PHENYLEPHRINE HYDROCHLORIDE 100 MCG: 10 INJECTION INTRAVENOUS at 08:36

## 2022-06-27 RX ADMIN — WATER 2 G: 1 INJECTION INTRAMUSCULAR; INTRAVENOUS; SUBCUTANEOUS at 16:55

## 2022-06-27 RX ADMIN — Medication 10 MG: at 09:34

## 2022-06-27 RX ADMIN — GABAPENTIN 100 MG: 100 CAPSULE ORAL at 23:30

## 2022-06-27 RX ADMIN — ROCURONIUM BROMIDE 20 MG: 50 INJECTION INTRAVENOUS at 08:43

## 2022-06-27 RX ADMIN — CEFAZOLIN SODIUM 2 G: 1 INJECTION, POWDER, FOR SOLUTION INTRAMUSCULAR; INTRAVENOUS at 08:31

## 2022-06-27 RX ADMIN — APIXABAN 5 MG: 5 TABLET, FILM COATED ORAL at 23:30

## 2022-06-27 RX ADMIN — ACETAMINOPHEN 650 MG: 325 TABLET ORAL at 17:41

## 2022-06-27 RX ADMIN — Medication 100 MCG: at 08:47

## 2022-06-27 RX ADMIN — Medication 5 MG: at 23:30

## 2022-06-27 RX ADMIN — PHENYLEPHRINE HYDROCHLORIDE 200 MCG: 10 INJECTION INTRAVENOUS at 08:33

## 2022-06-27 RX ADMIN — SEVELAMER CARBONATE 1600 MG: 800 TABLET, FILM COATED ORAL at 16:56

## 2022-06-27 RX ADMIN — PROPOFOL 30 MG: 10 INJECTION, EMULSION INTRAVENOUS at 08:24

## 2022-06-27 RX ADMIN — GLYCOPYRROLATE 0.4 MG: 0.2 INJECTION, SOLUTION INTRAMUSCULAR; INTRAVENOUS at 09:42

## 2022-06-27 RX ADMIN — DEXAMETHASONE SODIUM PHOSPHATE 4 MG: 4 INJECTION, SOLUTION INTRAMUSCULAR; INTRAVENOUS at 08:41

## 2022-06-27 RX ADMIN — Medication 10 MG: at 08:51

## 2022-06-27 RX ADMIN — HEPARIN SODIUM 5000 UNITS: 1000 INJECTION, SOLUTION INTRAVENOUS; SUBCUTANEOUS at 09:23

## 2022-06-27 RX ADMIN — MIDODRINE HYDROCHLORIDE 5 MG: 5 TABLET ORAL at 16:56

## 2022-06-27 RX ADMIN — ONDANSETRON 4 MG: 2 INJECTION INTRAMUSCULAR; INTRAVENOUS at 09:41

## 2022-06-27 RX ADMIN — Medication 100 MCG: at 09:35

## 2022-06-27 RX ADMIN — ROCURONIUM BROMIDE 10 MG: 50 INJECTION INTRAVENOUS at 09:08

## 2022-06-27 RX ADMIN — LIDOCAINE HYDROCHLORIDE 60 MG: 20 INJECTION, SOLUTION EPIDURAL; INFILTRATION; INTRACAUDAL; PERINEURAL at 08:23

## 2022-06-27 RX ADMIN — ALLOPURINOL 100 MG: 100 TABLET ORAL at 23:30

## 2022-06-27 RX ADMIN — SODIUM CHLORIDE: 9 INJECTION, SOLUTION INTRAVENOUS at 08:14

## 2022-06-27 RX ADMIN — FENTANYL CITRATE 50 MCG: 50 INJECTION, SOLUTION INTRAMUSCULAR; INTRAVENOUS at 08:21

## 2022-06-27 RX ADMIN — SODIUM CHLORIDE, PRESERVATIVE FREE 10 ML: 5 INJECTION INTRAVENOUS at 16:57

## 2022-06-27 NOTE — Clinical Note
TRANSFER - IN REPORT:     Verbal report received from: [facility] RN. Report consisted of patient's Situation, Background, Assessment and   Recommendations(SBAR). Opportunity for questions and clarification was provided. Assessment completed upon patient's arrival to unit and care assumed. Patient transported with a Cardiac Cath Tech / Patient Care Tech.

## 2022-06-27 NOTE — H&P
History and Physical    Patient: Susan Sinclair MRN: [id number]  SSN: [id number]    YOB: 1945  Age: 64 y.o. Sex: female      Subjective:      Susan Sinclair is a 64 y.o. female, morbidly obese with past medical history of hypertension, hyperlipidemia, type 2 diabetes, end-stage renal disease on hemodialysis with chronic hypotension on midodrine during dialysis, hypothyroidism on Synthroid, anemia of chronic disease, paroxysmal atrial fibrillation on chronic Eliquis with prior hematuria here for scheduled LAAC with watchman device. Past Medical History:   Diagnosis Date    Anemia in end-stage renal disease ([facility] [address]     Anticoagulated by anticoagulation treatment     On Apixaban    Chronic hypotension     On Midodrine    Chronic pain     Closed fracture of left inferior pubic ramus, with routine healing, subsequent encounter 11/12/2021    Closed fracture of superior pubic ramus, left, with routine healing, subsequent encounter 11/12/2021    Closed nondisplaced fracture of anterior wall of left acetabulum with routine healing 11/12/2021    Depression     End-stage renal disease on hemodialysis ([facility] [address]     HD at [facility].  Tel # [phone]    Gastroesophageal reflux disease     Glaucoma     History of acute pyelonephritis 2/20/2020    History of hydronephrosis 10/5/2021    History of infection with vancomycin resistant Enterococcus (VRE) 10/8/2021    Urine culture (collected 10/8/2021, resulted 10/14/2021) yielded growth of >100,000 colonies/ml of Enterococcus faecalis RESISTANT to Ciprofloxacin, Levofloxacin, Tetracycline and Vancomycin    History of kidney stones     History of recurrent urinary tract infection     History of sepsis 6/18/2021    History of septic shock 10/8/2021    History of urethral stricture     History of urinary tract infection 10/8/2021    Urine culture (collected 10/8/2021, resulted 10/14/2021) yielded growth of >100,000 colonies/ml of Enterococcus faecalis RESISTANT to Ciprofloxacin, Levofloxacin, Tetracycline and Vancomycin    Hyperlipidemia     Hyperphosphatemia 11/14/2021    Hypothyroidism     Lung mass     Mononeuropathy     Involving ring finger of left hand    Need for prophylactic isolation 10/8/2021    Urine culture (collected 10/8/2021, resulted 10/14/2021) yielded growth of >100,000 colonies/ml of Enterococcus faecalis RESISTANT to Ciprofloxacin, Levofloxacin, Tetracycline and Vancomycin    Paroxysmal atrial fibrillation (HCC)     Secondary hyperparathyroidism of renal origin ([facility] [address]     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 ([facility] [address]     HbA1c (10/8/2021) = 4.6    Uric acid nephrolithiasis     Urinary incontinence      Past Surgical History:   Procedure Laterality Date    HX APPENDECTOMY      HX CHOLECYSTECTOMY      HX GASTRIC BYPASS      Gastric stapling    HX KNEE ARTHROSCOPY      HX UROLOGICAL      right PCN placement    HX UROLOGICAL  07/23/2018    RIGHT URETEROSCOPY WITH HOLMIUM LASER    IR EXCHANGE NEPHRO PERC LT SI  2/21/2020    IR EXCHANGE NEPHRO PERC RT SI  4/13/2020    IR EXCHANGE NEPHRO PERC RT SI  7/17/2020    IR NEPHROSTOMY PERC RT PLC CATH  SI  10/14/2020    IR NEPHROURETERAL PERC RT PLC CATH NEW ACCESS  SI  4/30/2020    OH INTRO CATH DIALYSIS CIRCUIT DX ANGRPH FLUOR S&I Left 9/24/2020    FISTULOGRAM LEFT/poss permanent catheter placement performed by Doni Bocanegra MD at [facility] CATH LAB    VASCULAR SURGERY PROCEDURE UNLIST      lef AVF      Family History   Problem Relation Age of Onset    Heart Surgery Sister      Social History     Tobacco Use    Smoking status: Never Smoker    Smokeless tobacco: Never Used   Substance Use Topics    Alcohol use: Never      Prior to Admission medications    Medication Sig Start Date End Date Taking? Authorizing Provider   gabapentin (NEURONTIN) 100 mg capsule Take 1 Capsule by mouth nightly. Max Daily Amount: 100 mg.  Indications: concern for back pain post dialysis 5/27/22  Yes Evette Valentin MD   ondansetron hcl (ZOFRAN) 4 mg tablet Take 4 mg by mouth every eight (8) hours as needed for Nausea or Vomiting. 3/2/22  Yes Provider, Historical   melatonin 5 mg tablet Take 5 mg by mouth nightly. Yes Provider, Historical   HYDROmorphone (DILAUDID) 2 mg tablet Take 1 mg by mouth every eight (8) hours as needed for Pain. Take 1/2 tablet by mouth every 8 hours as needed for pain level of 5 out of 10 or greater   Yes Provider, Historical   allopurinoL (ZYLOPRIM) 100 mg tablet Take 1 Tablet by mouth every Monday, Wednesday, Friday. [after hemodialysis]  Indications: treatment to prevent acute gout attack 12/3/21  Yes Shameka Alvarez MD   amiodarone (CORDARONE) 200 mg tablet Take 1 Tablet by mouth daily. Indications: prevention of recurrent atrial fibrillation 12/3/21  Yes Shameka Alvarez MD   sevelamer carbonate (RENVELA) 800 mg tab tab Take 2 Tablets by mouth three (3) times daily (with meals). Indications: renal osteodystrophy with hyperphosphatemia 12/3/21  Yes Shameka Alvarez MD   sodium zirconium cyclosilicate (LOKELMA) 5 gram powder packet Take 1 Packet by mouth daily. Indications: high levels of potassium in the blood 12/3/21  Yes Shameka Alvarez MD   acetaminophen (Tylenol Extra Strength) 500 mg tablet Take 1 Tablet by mouth every six (6) hours as needed for Pain. 11/7/21  Yes Violeta Murray MD   lidocaine (LIDODERM) 5 % Apply patch to the affected area for 12 hours a day and remove for 12 hours a day. 11/7/21  Yes Violeta Murray MD   meclizine (ANTIVERT) 25 mg tablet Take 25 mg by mouth three (3) times daily as needed for Dizziness. 3/3/21  Yes Provider, Historical   DULoxetine (Cymbalta) 20 mg capsule Take 20 mg by mouth daily. Yes Provider, Historical   estradioL (Estrace) 0.01 % (0.1 mg/gram) vaginal cream Apply a fingertip amount around the urethra three times a week.  9/30/20  Yes Meredith Fay MD   biotin 1,000 mcg chew Take 1 Tab by mouth daily. Yes Provider, Historical   cyanocobalamin 1,000 mcg tablet Take 1,000 mcg by mouth daily. Yes Provider, Historical   lactobacillus sp. 50 billion cpu (BIO-K PLUS) 50 billion cell -375 mg cap capsule Take 1 Cap by mouth daily. 2/25/20  Yes Joe Bach MD   ascorbic acid, vitamin C, (VITAMIN C) 500 mg tablet Take 500 mg by mouth daily. Yes Other, MD Lexus   cholecalciferol (VITAMIN D3) (2,000 UNITS /50 MCG) cap capsule Take 2,000 Units by mouth two (2) times a day. Yes Other, MD Lexus   latanoprost (XALATAN) 0.005 % ophthalmic solution Administer 1 Drop to both eyes nightly. One drop at bedtime   Yes Samira, MD Lexus   levothyroxine (SYNTHROID) 125 mcg tablet Take 125 mcg by mouth Daily (before breakfast). Yes Other, MD Lexus   omeprazole (PRILOSEC) 20 mg capsule Take 20 mg by mouth daily. Yes Lexus Hogan MD   ondansetron (ZOFRAN ODT) 4 mg disintegrating tablet Take 4 mg by mouth every eight (8) hours as needed for Nausea or Vomiting. Yes Other, MD Lexus   vit B Cmplx 3-FA-Vit C-Biotin (NEPHRO HOWIE RX) 1- mg-mg-mcg tablet Take 1 Tab by mouth daily. Yes Other, MD Lexus   midodrine (PROAMATINE) 5 mg tablet Take 1 Tablet by mouth three (3) times daily (with meals) for 30 days. Patient taking differently: Take 2.5 mg by mouth DIALYSIS MON, WED & FRI. 5/27/22 6/26/22  Ewelina Mas MD   doxercalciferol (HECTOROL IV) 5 mcg by IntraVENous route. in HD  Patient not taking: Reported on 6/27/2022 9/29/21 9/28/22  Provider, Historical   apixaban (ELIQUIS) 5 mg tablet Take 1 Tablet by mouth two (2) times a day. 10/14/21   Sola Guillen MD   methoxy peg-epoetin beta [facility] INJECTION) 30 mcg by IntraVENous route. in HD 9/20/21 9/19/22  Provider, Historical   calcitRIOL (ROCALTROL) 0.25 mcg capsule Take 0.25 mcg by mouth daily.   Patient not taking: Reported on 6/27/2022    Other, MD Lexus        Allergies   Allergen Reactions    Albumin, Human 25 % Itching     Headache - severe migraine like, itchy eyes, runny nose    Ciprofloxacin Hives    Cyclopentolate Unknown (comments)    Iron Sucrose Diarrhea    Statins-Hmg-Coa Reductase Inhibitors Other (comments)     Body ache       Review of Systems:  A comprehensive review of systems was negative except for that written in the History of Present Illness. Objective:     Vitals:    06/27/22 1115 06/27/22 1130 06/27/22 1145 06/27/22 1200   BP: (!) 95/41 (!) 99/44  (!) 105/48   Pulse: (!) 40 (!) 47 (!) 49 (!) 56   Resp: 12 16 16 16   SpO2: 97% 97% 97% 97%   Weight:       Height:            Physical Exam:  General:  Alert, cooperative, no distress, appears stated age. Eyes:  Conjunctivae/corneas clear. PERRL, EOMs intact. Fundi benign   Ears:  Normal TMs and external ear canals both ears. Nose: Nares normal. Septum midline. Mucosa normal. No drainage or sinus tenderness. Mouth/Throat: Lips, mucosa, and tongue normal. Teeth and gums normal.   Neck: Supple, symmetrical, trachea midline, no adenopathy, thyroid: no enlargment/tenderness/nodules, no carotid bruit and no JVD. Back:   Symmetric, no curvature. ROM normal. No CVA tenderness. Lungs:   Clear to auscultation bilaterally. Heart:  Regular rate and rhythm, S1, S2 normal, no murmur, click, rub or gallop. Abdomen:   Soft, non-tender. Bowel sounds normal. No masses,  No organomegaly. Extremities: Extremities normal, atraumatic, no cyanosis or edema. Pulses: 2+ and symmetric all extremities. Skin: Skin color, texture, turgor normal. No rashes or lesions   Lymph nodes: Cervical, supraclavicular, and axillary nodes normal.   Neurologic: CNII-XII intact. Normal strength, sensation and reflexes throughout.        Assessment:     Hospital Problems  Date Reviewed: 5/14/2022    None          Plan:     -CATINA guided LAAC    Signed By: Jose Dunbar MD     June 27, 2022

## 2022-06-27 NOTE — BRIEF OP NOTE
Brief Postoperative Note    Patient: Lizbet Griffith  YOB: 1945  MRN: [id number]    Date of Procedure: 6/27/2022     Pre-Op Diagnosis: Paroxysmal atrial fibrillation ([address] [I48.0]    Post-Op Diagnosis: Same as preoperative diagnosis. Procedure(s):  WATCHMAN GOLD CLOSURE DEVICE    Surgeon(s):  Gloris Maya, MD Hurtis Litten, MD    Surgical Assistant: None    Anesthesia: General     Estimated Blood Loss (mL): Minimal    Complications: None    Specimens: * No specimens in log *     Implants:   Implant Name Type Inv. Item Serial No.  Lot No. LRB No. Used Action   DEVICE CLOSURE 31 MM 12 FR W/DEL SYS WATCHMAN FLX - [id number]  DEVICE CLOSURE 31 MM 12 FR W/DEL SYS WATCHMAN FLX  [employer]_ [id number] Right 1 Implanted       Drains:   [REMOVED] Nephrostomy Tube 05/18/20 (Removed)       [REMOVED] Nephroureteral Drain 07/17/20 Right Ureter (Removed)       [REMOVED] Nephroureteral Drain 10/14/20 Right Ureter (Removed)       Findings: Successful placement of 31mm Watchman Flx left atrial appendage closure device under CATINA guidance with good position, good anchoring noted on tug test and no leak.      Electronically Signed by Wild Mendez MD on 6/27/2022 at 10:44 AM

## 2022-06-27 NOTE — HOME HEALTH
Skilled reason for visit: ESRD,A FIB,DM, HTN,Medication Management         Caregiver involvement: Patient is independent at this time. Family/friends are .available should a need arise. Medications reviewed and all medications are available in the home this visit. The following education was provided regarding medications: All medication reviewed no question,concerns or changes        MD notified of any discrepancies/look a-like medications/medication interactions: n/a    Medications are effective at this time. Home health supplies by type and quantity ordered/delivered this visit include: n/a         Patient education provided this visit.medication teaching  safety and fall prevention education  nutrition education  skin care and assessment  pain management            Sharps education provided: n/a         Patient level of understanding of education provided: Patient verbalized complete understanding of education provided          Skilled Care Performed this visit: education          Patient response to procedure performed:  n/a         Agency Progress toward goals: GOALS MET         Patient's Progress towards personal goals: GOALS MET          Home exercise program: Continue use of assistive devices to keep safe          Continued need for the following skills: Physical Therapy         Plan for next visit: n/a         Patient and/or caregiver notified and agrees to changes in the Plan of Care YES           The following discharge planning was discussed with the pt/caregiver: when patient reaches goals and medication is managed, and disease processes are understood patient agrees and understand that discharge will take place.

## 2022-06-27 NOTE — ROUTINE PROCESS
6554  Cath holding summary     Patient escorted to cath holding from waiting area ambulatory, alert and oriented x 4, voicing no complaints. Changed into gown and placed on monitor. NPO since MN. Lab results, med rec and H&P reviewed on chart. PIV x 2 inserted without difficulty. Family to be called when procedure is finished. 3630  TRANSFER - OUT REPORT:    Verbal report given to Marce Laws RN (name) on Deward Read  being transferred to Cath Lab(unit) for ordered procedure     Report consisted of patients Situation, Background, Assessment and   Recommendations(SBAR). Information from the following report(s) SBAR, Procedure Summary, Intake/Output, MAR, Alarm Parameters , Pre Procedure Checklist, Procedure Verification and Quality Measures was reviewed with the receiving nurse. Lines:   Venous Access Device AV Fistula (Active)       Peripheral IV 06/27/22 Right Antecubital (Active)       Peripheral IV 06/27/22 Anterior;Right Wrist (Active)       Arterial Line 06/27/22 Right Radial artery (Active)     Opportunity for questions and clarification was provided. Patient transported with:   Registered Nurse    6415  TRANSFER - IN REPORT:  Verbal report received from [address] [place] [address] (name) on Deward Read  being received from Cath Lab (unit) for ordered procedure    Report consisted of patients Situation, Background, Assessment and   Recommendations(SBAR). Information from the following report(s) SBAR, Procedure Summary, Intake/Output, MAR, Alarm Parameters , Procedure Verification and Quality Measures was reviewed with the receiving nurse. Opportunity for questions and clarification was provided. Assessment completed upon patients arrival to unit and care assumed. A-line site and R groin site CDI. 2L O2 NC applied for O2 sat of 87%. Pt is now at 97%.      1430  TRANSFER - OUT REPORT:    Verbal report given to [address]os  (name) on Deward Read  being transferred to CVT ICU (unit) for ordered procedure - Watchman placement Report consisted of patients Situation, Background, Assessment and   Recommendations(SBAR). Information from the following report(s) SBAR, Procedure Summary, Intake/Output, MAR and Procedure Verification was reviewed with the receiving nurse. Lines:   Venous Access Device AV Fistula (Active)       Peripheral IV 06/27/22 Right Antecubital (Active)       Peripheral IV 06/27/22 Anterior;Right Wrist (Active)     Opportunity for questions and clarification was provided.     Patient transported with:   O2 @ 2 liters  Registered Nurse

## 2022-06-27 NOTE — PROGRESS NOTES
RENAL DAILY PROGRESS NOTE    Patient: Riky Blackburn               Sex: female          DOA: 6/27/2022  6:37 AM        YOB: 1945      Age:  64 y.o.        LOS:  LOS: 0 days     Subjective:     Riky Blackburn is a 64 y.o.  who presents with Paroxysmal atrial fibrillation ([facility] [address] [I48.0]. Asked to evaluate for esrd,dialysis,s/p watchman device insertion  Chief complains: Patient denies nausea, vomiting, chest pain, dizziness, shortness of breath or headache.  - Reviewed last 24 hrs events     Current Facility-Administered Medications   Medication Dose Route Frequency    sodium chloride (NS) flush 5-40 mL  5-40 mL IntraVENous Q8H    sodium chloride (NS) flush 5-40 mL  5-40 mL IntraVENous PRN    0.9% sodium chloride infusion 250 mL  250 mL IntraVENous CONTINUOUS    apixaban (ELIQUIS) tablet 5 mg  5 mg Oral Q12H    dextrose 40% (GLUTOSE) oral gel 15.2 g  15.2 g Oral PRN    glucagon (GLUCAGEN) injection 1 mg  1 mg SubCUTAneous PRN    dextrose 10% infusion 0-250 mL  0-250 mL IntraVENous PRN    ceFAZolin (ANCEF) 2 g in sterile water (preservative free) 20 mL IV syringe  2 g IntraVENous ONCE       Objective:     Visit Vitals  BP (!) 105/48   Pulse (!) 56   Resp 16   Ht 5' 2\" (1.575 m)   Wt 91 kg (200 lb 9.9 oz)   SpO2 97%   BMI 36.69 kg/m²       Intake/Output Summary (Last 24 hours) at 6/27/2022 1217  Last data filed at 6/27/2022 8275  Gross per 24 hour   Intake 500 ml   Output --   Net 500 ml       Physical Examination:     GEN: AAO X 3, NAD  RS: Chest is bilateral equal,  CVS: S1-S2 heard,  Abdomen: Soft, Non tender, Not distended, Positive bowel sounds, no organomegaly, no CVA / supra pubic tenderness  Extremities: + edema, no cyanosis, skin is warm on touch  CNS: Awake & follows commands,   HEENT: Head is atraumatic, PERRLA, conjunctiva pink & non icteric.  No JVD or carotid bruit     Data Review:      Labs:     Hematology:   Recent Labs     06/27/22  0725   WBC 6.9   HGB 10.4*   HCT 34.1* Chemistry:   Recent Labs     06/27/22  0725   BUN 38*   CREA 8.18*   CA 9.0   K 5.0         CO2 27   GLU 84        Images:    XR (Most Recent). CXR reviewed by me and compared with previous CXR Results from Hospital Encounter encounter on 05/25/22    XR CHEST PORT    Narrative  HISTORY: Exertional dyspnea. EXAM: Chest.    TECHNIQUE: Single view portable upright chest.    COMPARISON: No prior studies for comparison. FINDINGS: There is no pneumothorax, pneumonia or pleural effusions. Right  diaphragmatic eventration. Heart and mediastinal structures are unremarkable. .  Visualized bony thorax and soft tissues are within normal limits. Impression  IMPRESSION:    1. No acute cardiopulmonary process. CT (Most Recent) Results from Hospital Encounter encounter on 03/03/22    CT PELV WO CONT    Narrative  CT PELVIS WITHOUT ENHANCEMENT    INDICATION: Atrial fibrillation on anticoagulation status post fall with left  knee pain and bruising, pain left hip. .    TECHNIQUE: 5 mm collimation axial images obtained from the iliac crest to the  level of the pubic symphysis without intravenous contrast. Coronal and sagittal  reformatted images. All CT scans at this facility are performed using dose optimization technique as  appropriate to an ER patient is performed exam, to include automated exposure  control, adjustment of the mA and/or kV according to patient size (including  appropriate matching first site-specific examinations), or use of iterative  reconstruction technique. COMPARISON: 11/19/2021. PELVIS FINDINGS:  Lack of intravenous contrast renders this study suboptimal for evaluating the  solid abdominal organs, vasculature and bowel. Incompletely visualized right ureteral stent. Diverticulosis. No lymphadenopathy  or ascites. Bladder is normal. Limited visualized bowel is normal in caliber. Mild anasarca.     Interval callus formation at incompletely healed left inferior pubic ramus  fracture. Increased impaction of the left superior pubic ramus at the anterior  acetabulum. There is adjacent callus. There was a fracture at this location on  prior CT from 11/20/2021. No dislocation. Bones are osteopenic. There is  interval sclerosis at the left sacral ala fracture. Impression  1. There has been interval incomplete healing of left sacral ala, left inferior  and superior pubic ramus fractures which were present on 11/19/2021 CT. Increased impaction at the left superior pubic ramus fracture/anterior  acetabulum since that time. 2.  Osteopenia. 3.  Diverticulosis. 4.  Mild anasarca. EKG No results found for this or any previous visit. I have personally reviewed the old medical records and patient's labs    Plan / Recommendation:      1.  Esrd,plan dialysis today,use iv albumin during dialysis to support bp  2,anemia,give epo    D/w Namrata Lawrence MD  Nephrology  6/27/2022

## 2022-06-27 NOTE — Clinical Note
TRANSFER - OUT REPORT:     Verbal report given to: Lashonda Fisher RN. Report consisted of patient's Situation, Background, Assessment and   Recommendations(SBAR). Opportunity for questions and clarification was provided. Patient transported with a Cardiac Cath Tech / Patient Care Tech. Patient transported to: holding area.

## 2022-06-27 NOTE — PROGRESS NOTES
1530-contacted dialysis nurse to inform them pt is in room. 1710-informed Dr. Lozoya Begun pt is complaining of right side chest pain and she does not have any medications to treat pain. Orders to follow. 1722- contacted dialysis to follow up on dialysis. Pt will dialyzed at 9 pm per nurse.

## 2022-06-27 NOTE — H&P
Admission History and Physical  [facility]      Patient: Lalita Manzo MRN: [id number]  [facility]: [id number]    YOB: 1945  Age: 64 y.o. Sex: female      Admission Date: 6/27/2022       HPI:     Lalita Manzo is a 64 y.o. female with PMH paroxysmal A. fib, ESRD on hemodialysis Monday Wednesday Friday, HLD, hypothyroidism, chronic hypotension, DM2, depression, glaucoma, recurrent UTIs, gout, low back pain, now presenting status post watchman procedure. Patient is admitted for standard postprocedural care of placement of a watchman device. Patient had this device placed for paroxysmal atrial fibrillation on anticoagulation with high bleeding risk. Per report, patient had small bout of hypotension as was expected with her induction however the rest of the procedure went by without any significant complication. Patient has since been transferred over to standard postprocedure care room. Past Medical History:   Diagnosis Date    Anemia in end-stage renal disease ([facility] [address]     Anticoagulated by anticoagulation treatment     On Apixaban    Chronic hypotension     On Midodrine    Chronic pain     Closed fracture of left inferior pubic ramus, with routine healing, subsequent encounter 11/12/2021    Closed fracture of superior pubic ramus, left, with routine healing, subsequent encounter 11/12/2021    Closed nondisplaced fracture of anterior wall of left acetabulum with routine healing 11/12/2021    Depression     End-stage renal disease on hemodialysis ([facility] [address]     HD at [facility] on [address].  Tel # [phone]    Gastroesophageal reflux disease     Glaucoma     History of acute pyelonephritis 2/20/2020    History of hydronephrosis 10/5/2021    History of infection with vancomycin resistant Enterococcus (VRE) 10/8/2021    Urine culture (collected 10/8/2021, resulted 10/14/2021) yielded growth of >100,000 colonies/ml of Enterococcus faecalis RESISTANT to Ciprofloxacin, Levofloxacin, Tetracycline and Vancomycin    History of kidney stones     History of recurrent urinary tract infection     History of sepsis 6/18/2021    History of septic shock 10/8/2021    History of urethral stricture     History of urinary tract infection 10/8/2021    Urine culture (collected 10/8/2021, resulted 10/14/2021) yielded growth of >100,000 colonies/ml of Enterococcus faecalis RESISTANT to Ciprofloxacin, Levofloxacin, Tetracycline and Vancomycin    Hyperlipidemia     Hyperphosphatemia 11/14/2021    Hypothyroidism     Lung mass     Mononeuropathy     Involving ring finger of left hand    Need for prophylactic isolation 10/8/2021    Urine culture (collected 10/8/2021, resulted 10/14/2021) yielded growth of >100,000 colonies/ml of Enterococcus faecalis RESISTANT to Ciprofloxacin, Levofloxacin, Tetracycline and Vancomycin    Paroxysmal atrial fibrillation (HCC)     Secondary hyperparathyroidism of renal origin ([facility] [address]     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 ([facility] [address]     HbA1c (10/8/2021) = 4.6    Uric acid nephrolithiasis     Urinary incontinence        Past Surgical History:   Procedure Laterality Date    HX APPENDECTOMY      HX CHOLECYSTECTOMY      HX GASTRIC BYPASS      Gastric stapling    HX KNEE ARTHROSCOPY      HX UROLOGICAL      right PCN placement    HX UROLOGICAL  07/23/2018    RIGHT URETEROSCOPY WITH HOLMIUM LASER    IR EXCHANGE NEPHRO PERC LT SI  2/21/2020    IR EXCHANGE NEPHRO PERC RT SI  4/13/2020    IR EXCHANGE NEPHRO PERC RT SI  7/17/2020    IR NEPHROSTOMY PERC RT PLC CATH  SI  10/14/2020    IR NEPHROURETERAL PERC RT PLC CATH NEW ACCESS  SI  4/30/2020    [place] INTRO CATH DIALYSIS CIRCUIT DX ANGRPH FLUOR S&I Left 9/24/2020    FISTULOGRAM LEFT/poss permanent catheter placement performed by Tollie Siemens, MD at [facility] CATH LAB    VASCULAR SURGERY PROCEDURE UNLIST      lef AVF       Family History   Problem Relation Age of Onset    Heart Surgery Sister        Social History     Socioeconomic History    Marital status: SINGLE   Tobacco Use    Smoking status: Never Smoker    Smokeless tobacco: Never Used   Vaping Use    Vaping Use: Never used   Substance and Sexual Activity    Alcohol use: Never    Drug use: Never       Allergies   Allergen Reactions    Albumin, Human 25 % Itching     Headache - severe migraine like, itchy eyes, runny nose    Ciprofloxacin Hives    Cyclopentolate Unknown (comments)    Iron Sucrose Diarrhea    Statins-Hmg-Coa Reductase Inhibitors Other (comments)     Body ache       Prior to Admission Medications   Prescriptions Last Dose Informant Patient Reported? Taking? DULoxetine (Cymbalta) 20 mg capsule 6/26/2022 at Unknown time  Yes Yes   Sig: Take 20 mg by mouth daily. HYDROmorphone (DILAUDID) 2 mg tablet 6/20/2022 at Unknown time  Yes Yes   Sig: Take 1 mg by mouth every eight (8) hours as needed for Pain. Take 1/2 tablet by mouth every 8 hours as needed for pain level of 5 out of 10 or greater   acetaminophen (Tylenol Extra Strength) 500 mg tablet 6/26/2022 at Unknown time  No Yes   Sig: Take 1 Tablet by mouth every six (6) hours as needed for Pain. allopurinoL (ZYLOPRIM) 100 mg tablet 6/27/2022 at Unknown time  No Yes   Sig: Take 1 Tablet by mouth every Monday, Wednesday, Friday. [after hemodialysis]  Indications: treatment to prevent acute gout attack   amiodarone (CORDARONE) 200 mg tablet 6/27/2022 at Unknown time  No Yes   Sig: Take 1 Tablet by mouth daily. Indications: prevention of recurrent atrial fibrillation   apixaban (ELIQUIS) 5 mg tablet 6/24/2022  No No   Sig: Take 1 Tablet by mouth two (2) times a day. ascorbic acid, vitamin C, (VITAMIN C) 500 mg tablet 6/26/2022 at Unknown time  Yes Yes   Sig: Take 500 mg by mouth daily. biotin 1,000 mcg chew 6/26/2022 at Unknown time  Yes Yes   Sig: Take 1 Tab by mouth daily.    calcitRIOL (ROCALTROL) 0.25 mcg capsule Not Taking at Unknown time  Yes No   Sig: Take 0.25 mcg by mouth daily. Patient not taking: Reported on 2022   cholecalciferol (VITAMIN D3) (2,000 UNITS /50 MCG) cap capsule 2022 at Unknown time  Yes Yes   Sig: Take 2,000 Units by mouth two (2) times a day. cyanocobalamin 1,000 mcg tablet 2022 at Unknown time  Yes Yes   Sig: Take 1,000 mcg by mouth daily. doxercalciferol (HECTOROL IV) Not Taking at Unknown time  Yes No   Si mcg by IntraVENous route. in HD   Patient not taking: Reported on 2022   estradioL (Estrace) 0.01 % (0.1 mg/gram) vaginal cream 2022 at Unknown time  No Yes   Sig: Apply a fingertip amount around the urethra three times a week.   gabapentin (NEURONTIN) 100 mg capsule 2022 at Unknown time  No Yes   Sig: Take 1 Capsule by mouth nightly. Max Daily Amount: 100 mg. Indications: concern for back pain post dialysis   lactobacillus sp. 50 billion cpu (BIO-K PLUS) 50 billion cell -375 mg cap capsule 2022 at Unknown time  No Yes   Sig: Take 1 Cap by mouth daily. latanoprost (XALATAN) 0.005 % ophthalmic solution 2022 at Unknown time  Yes Yes   Sig: Administer 1 Drop to both eyes nightly. One drop at bedtime   levothyroxine (SYNTHROID) 125 mcg tablet 2022 at Unknown time  Yes Yes   Sig: Take 125 mcg by mouth Daily (before breakfast). lidocaine (LIDODERM) 5 % 2022 at Unknown time  No Yes   Sig: Apply patch to the affected area for 12 hours a day and remove for 12 hours a day. meclizine (ANTIVERT) 25 mg tablet 2022 at Unknown time  Yes Yes   Sig: Take 25 mg by mouth three (3) times daily as needed for Dizziness. melatonin 5 mg tablet 2022 at Unknown time  Yes Yes   Sig: Take 5 mg by mouth nightly. methoxy peg-epoetin beta (MIRCERA INJECTION) 2022  Yes No   Si mcg by IntraVENous route. in HD   midodrine (PROAMATINE) 5 mg tablet   No No   Sig: Take 1 Tablet by mouth three (3) times daily (with meals) for 30 days.    Patient taking differently: Take 2.5 mg by mouth DIALYSIS MON, WED & FRI. omeprazole (PRILOSEC) 20 mg capsule 6/27/2022 at Unknown time  Yes Yes   Sig: Take 20 mg by mouth daily. ondansetron (ZOFRAN ODT) 4 mg disintegrating tablet 5/27/2022 at Unknown time  Yes Yes   Sig: Take 4 mg by mouth every eight (8) hours as needed for Nausea or Vomiting. ondansetron hcl (ZOFRAN) 4 mg tablet 5/27/2022 at Unknown time  Yes Yes   Sig: Take 4 mg by mouth every eight (8) hours as needed for Nausea or Vomiting.   sevelamer carbonate (RENVELA) 800 mg tab tab 6/26/2022 at Unknown time  No Yes   Sig: Take 2 Tablets by mouth three (3) times daily (with meals). Indications: renal osteodystrophy with hyperphosphatemia   sodium zirconium cyclosilicate (LOKELMA) 5 gram powder packet 5/27/2022 at Unknown time  No Yes   Sig: Take 1 Packet by mouth daily. Indications: high levels of potassium in the blood   vit B Cmplx 3-FA-Vit C-Biotin (NEPHRO HOWIE RX) 1- mg-mg-mcg tablet 6/26/2022 at Unknown time  Yes Yes   Sig: Take 1 Tab by mouth daily.       Facility-Administered Medications: None       Physical Exam:     Patient Vitals for the past 24 hrs:   Pulse Resp BP SpO2   06/27/22 1415 (!) 54 12 (!) 95/44 92 %   06/27/22 1400 (!) 54 17 (!) 101/47 93 %   06/27/22 1345 (!) 56 14 (!) 105/55 99 %   06/27/22 1330 (!) 52 17 (!) 106/54 96 %   06/27/22 1315 (!) 58 16 (!) 109/51 99 %   06/27/22 1300 (!) 58 21 (!) 104/48 (!) 69 %   06/27/22 1245 (!) 58 14 (!) 104/49 99 %   06/27/22 1230 (!) 42 16 (!) 105/47 97 %   06/27/22 1215 (!) 46 15 (!) 91/43 97 %   06/27/22 1200 (!) 56 16 (!) 105/48 97 %   06/27/22 1145 (!) 49 16 -- 97 %   06/27/22 1130 (!) 47 16 (!) 99/44 97 %   06/27/22 1115 (!) 40 12 (!) 95/41 97 %   06/27/22 1100 (!) 51 14 (!) 96/44 99 %   06/27/22 1045 (!) 50 14 (!) 110/47 98 %   06/27/22 1030 (!) 46 15 (!) 114/47 98 %   06/27/22 1015 (!) 52 16 (!) 107/41 96 %   06/27/22 1000 62 14 (!) 104/43 (!) 88 %   06/27/22 0959 66 15 (!) 104/47 95 %   06/27/22 0715 64 13 (!) 140/56 98 % Physical Exam:  General:  AAOx3, NAD   HEENT: Conjunctiva pink, sclera anicteric. PERRL. EOMI. Pharynx moist, nonerythematous. Moist mucous membranes. CV: Sinus bradycardia, no murmurs. No visible pulsations or thrills. RESP:  Unlabored breathing. Lungs clear to auscultation without adventitious breath sounds. Equal expansion bilaterally. ABD:  Soft, nontender, nondistended. BS (+). MS:  No joint deformity or instability. No atrophy. Mild right chest wall tenderness to palpation  Neuro:  CN II-XII grossly intact. 5/5 strength bilateral upper extremities and lower extremities. Ext:  No edema. 2+ radial and dp pulses bilaterally. Skin:  No rashes, lesions, or ulcers. Good turgor. Chemistry Recent Labs     06/27/22  0725   GLU 84      K 5.0      CO2 27   BUN 38*   CREA 8.18*   CA 9.0   AGAP 9   BUCR 5*        CBC w/Diff Recent Labs     06/27/22  0725   WBC 6.9   RBC 3.26*   HGB 10.4*   HCT 34.1*           Liver Enzymes Protein, total   Date Value Ref Range Status   05/27/2022 7.5 6.4 - 8.2 g/dL Final     Albumin   Date Value Ref Range Status   05/27/2022 3.3 (L) 3.4 - 5.0 g/dL Final     Globulin   Date Value Ref Range Status   05/27/2022 4.2 (H) 2.0 - 4.0 g/dL Final     A-G Ratio   Date Value Ref Range Status   05/27/2022 0.8 0.8 - 1.7   Final     Alk. phosphatase   Date Value Ref Range Status   05/27/2022 114 45 - 117 U/L Final     No results for input(s): TP, ALB, GLOB, AGRAT, AP, TBIL in the last 72 hours. No lab exists for component: SGOT, GPT, DBIL     Lactic Acid Lactic acid   Date Value Ref Range Status   05/15/2020 1.1 0.4 - 2.0 MMOL/L Final     No results for input(s): LAC in the last 72 hours.      BNP No results found for: BNP, BNPP, XBNPT     Cardiac Enzymes No results found for: CPK, CK, CKMMB, CKMB, RCK3, CKMBT, CKNDX, CKND1, PATTI, TROPT, TROIQ, GRAHAM, TROPT, TNIPOC, BNP, BNPP     Coagulation Recent Labs     06/27/22  0725   PTP 16.8*   INR 1.3* Thyroid  Lab Results   Component Value Date/Time    TSH 0.60 05/25/2022 09:12 AM          Lipid Panel No results found for: CHOL, CHOLPOCT, CHOLX, CHLST, CHOLV, 731186, HDL, HDLP, LDL, LDLC, DLDLP, 356564, VLDLC, VLDL, TGLX, TRIGL, TRIGP, TGLPOCT, CHHD, CHHDX     ABG No results for input(s): PHI, PHI, POC2, PCO2I, PO2, PO2I, HCO3, HCO3I, FIO2, FIO2I in the last 72 hours. Urinalysis Lab Results   Component Value Date/Time    Color RED 05/26/2022 11:00 PM    Appearance BLOODY 05/26/2022 11:00 PM    Specific gravity 1.020 05/26/2022 11:00 PM    pH (UA) 8.5 (H) 05/26/2022 11:00 PM    Protein >300 (A) 05/26/2022 11:00 PM    Glucose 100 (A) 05/26/2022 11:00 PM    Ketone Negative 05/26/2022 11:00 PM    Bilirubin SMALL (A) 05/26/2022 11:00 PM    Urobilinogen 0.2 05/26/2022 11:00 PM    Nitrites Negative 05/26/2022 11:00 PM    Leukocyte Esterase MODERATE (A) 05/26/2022 11:00 PM    Epithelial cells 1+ 05/26/2022 11:00 PM    Bacteria 4+ (A) 12/02/2021 06:56 AM    WBC  05/26/2022 11:00 PM     UNABLE TO QUANTITATE MICROSCOPIC PARAMETERS DUE TO EXCESSIVE RBCS    RBC TOO NUMEROUS TO COUNT 05/26/2022 11:00 PM        Micro No results for input(s): SDES, CULT in the last 72 hours. No results for input(s): CULT in the last 72 hours. Imaging:  XR (Most Recent). Results from [place] encounter on 05/25/22    XR CHEST PORT    Narrative  HISTORY: Exertional dyspnea. EXAM: Chest.    TECHNIQUE: Single view portable upright chest.    COMPARISON: No prior studies for comparison. FINDINGS: There is no pneumothorax, pneumonia or pleural effusions. Right  diaphragmatic eventration. Heart and mediastinal structures are unremarkable. .  Visualized bony thorax and soft tissues are within normal limits. Impression  IMPRESSION:    1. No acute cardiopulmonary process.        CT (Most Recent) Results from Hospital Encounter encounter on 03/03/22    CT PELV WO CONT    Narrative  CT PELVIS WITHOUT ENHANCEMENT    INDICATION: Atrial fibrillation on anticoagulation status post fall with left  knee pain and bruising, pain left hip. .    TECHNIQUE: 5 mm collimation axial images obtained from the iliac crest to the  level of the pubic symphysis without intravenous contrast. Coronal and sagittal  reformatted images. All CT scans at this facility are performed using dose optimization technique as  appropriate to an ER patient is performed exam, to include automated exposure  control, adjustment of the mA and/or kV according to patient size (including  appropriate matching first site-specific examinations), or use of iterative  reconstruction technique. COMPARISON: 11/19/2021. PELVIS FINDINGS:  Lack of intravenous contrast renders this study suboptimal for evaluating the  solid abdominal organs, vasculature and bowel. Incompletely visualized right ureteral stent. Diverticulosis. No lymphadenopathy  or ascites. Bladder is normal. Limited visualized bowel is normal in caliber. Mild anasarca. Interval callus formation at incompletely healed left inferior pubic ramus  fracture. Increased impaction of the left superior pubic ramus at the anterior  acetabulum. There is adjacent callus. There was a fracture at this location on  prior CT from 11/20/2021. No dislocation. Bones are osteopenic. There is  interval sclerosis at the left sacral ala fracture. Impression  1. There has been interval incomplete healing of left sacral ala, left inferior  and superior pubic ramus fractures which were present on 11/19/2021 CT. Increased impaction at the left superior pubic ramus fracture/anterior  acetabulum since that time. 2.  Osteopenia. 3.  Diverticulosis. 4.  Mild anasarca. ECHO No results found for this or any previous visit. EKG No results found for this or any previous visit.      Assessment/Plan:   64 y.o. female with PMH paroxysmal A. fib, ESRD on hemodialysis Monday Wednesday Friday, HLD, hypothyroidism, chronic hypotension, DM2, depression, glaucoma, recurrent UTIs, gout, low back pain, now presenting status post watchman procedure.     Status post watchman procedure  - Admit to CVT ICU  -Nephrology and cardiology consulted  - Labs per cardiology  -Limited TTE per cardiology  - Vital signs per unit routine  - Monitor I/Os  - Ambulate with assistance  - Replete electrolytes daily  - PT/OT/CM    Paroxysmal afib  -Home meds: amiodarone 200mg , Eliquis 5mg bid  -Restarting medications per cardiology orders.        ESRD on HD (MWF)  -Nephrology consulted, dialysis as per schedule/need     Chronic hypotension  - Continue Midodrine 10mg tid.     Hypothyroidism  -cont home levothyroxine 125mcg        DM II with neuropathy  -Gabapentin 100 ghs     Depression  Continue Duloxetine 20mg.     Gout  Continue Allopurinol 100mg MWF.      Global Care:  - VS per unit routine  - supplemental O2 for sats < 92%  - incentive spirometer  - PT/OT/CM     Diet  nephro diet   DVT Prophylaxis  Eliquis   GI Prophylaxis  Pantoprazole    Code status   Full for procedure   Disposition  home 1-2 night     Natasha Hernandez MD , PGY-2   [facility]   June 27, 2022, 3:32 PM

## 2022-06-27 NOTE — ANESTHESIA PROCEDURE NOTES
Arterial Line Placement    Start time: 6/27/2022 8:50 AM  End time: 6/27/2022 8:55 AM  Performed by: Jessica Helm MD  Authorized by: Jessica Helm MD     Pre-Procedure  Indications:  Arterial pressure monitoring  Preanesthetic Checklist: patient identified, risks and benefits discussed, anesthesia consent, site marked, patient being monitored, timeout performed and patient being monitored    Timeout Time: 08:50 EDT        Procedure:   Prep:  Alcohol  Seldinger Technique?: No    Orientation:  Right  Location:  Radial artery  Catheter size:  20 G  Number of attempts:  2 (first attempt SRNA)  Cont Cardiac Output Sensor: No      Assessment:   Post-procedure:  Line secured and sterile dressing applied  Patient Tolerance:  Patient tolerated the procedure well with no immediate complications

## 2022-06-27 NOTE — ANESTHESIA POSTPROCEDURE EVALUATION
Procedure(s):  WATCHMAN GOLD CLOSURE DEVICE. general    Anesthesia Post Evaluation      Multimodal analgesia: multimodal analgesia used between 6 hours prior to anesthesia start to PACU discharge  Patient location during evaluation: PACU  Patient participation: complete - patient participated  Level of consciousness: awake  Pain score: 2  Pain management: adequate  Airway patency: patent  Anesthetic complications: no  Cardiovascular status: acceptable  Respiratory status: acceptable  Hydration status: acceptable  Post anesthesia nausea and vomiting:  none  Final Post Anesthesia Temperature Assessment:  Normothermia (36.0-37.5 degrees C)      INITIAL Post-op Vital signs:   Vitals Value Taken Time   BP 95/41 06/27/22 1110   Temp     Pulse 44 06/27/22 1119   Resp 15 06/27/22 1119   SpO2 97 % 06/27/22 1119   Vitals shown include unvalidated device data.

## 2022-06-27 NOTE — ANESTHESIA PREPROCEDURE EVALUATION
Anesthetic History   No history of anesthetic complications            Review of Systems / Medical History  Patient summary reviewed, nursing notes reviewed and pertinent labs reviewed    Pulmonary  Within defined limits                 Neuro/Psych   Within defined limits           Cardiovascular            Dysrhythmias : atrial fibrillation  CAD (05/2022 No obstructive coronary artery disease) and hyperlipidemia      Comments: 06/2022 CATINA  estimated EF of 55 - 60%.    GI/Hepatic/Renal     GERD    Renal disease: ESRD and dialysis      Comments: HX GASTRIC BYPASS Endo/Other    Diabetes: type 2  Hypothyroidism  Obesity and anemia     Other Findings            Physical Exam    Airway  Mallampati: III  TM Distance: 4 - 6 cm  Neck ROM: normal range of motion   Mouth opening: Normal     Cardiovascular    Rhythm: irregular           Dental    Dentition: Poor dentition     Pulmonary  Breath sounds clear to auscultation               Abdominal  GI exam deferred       Other Findings            Anesthetic Plan    ASA: 4  Anesthesia type: general          Induction: Intravenous  Anesthetic plan and risks discussed with: Patient

## 2022-06-27 NOTE — PROGRESS NOTES
Problem: Risk for Spread of Infection  Goal: Prevent transmission of infectious organism to others  Description: Prevent the transmission of infectious organisms to other patients, staff members, and visitors. Outcome: Progressing Towards Goal     Problem: Patient Education:  Go to Education Activity  Goal: Patient/Family Education  Outcome: Progressing Towards Goal     Problem: Pressure Injury - Risk of  Goal: *Prevention of pressure injury  Description: Document Giovany Scale and appropriate interventions in the flowsheet. Outcome: Progressing Towards Goal  Note: Pressure Injury Interventions: Activity Interventions: Pressure redistribution bed/mattress(bed type),PT/OT evaluation,Increase time out of bed    Mobility Interventions: Pressure redistribution bed/mattress (bed type),PT/OT evaluation    Nutrition Interventions: Document food/fluid/supplement intake                     Problem: Patient Education: Go to Patient Education Activity  Goal: Patient/Family Education  Outcome: Progressing Towards Goal     Problem: Falls - Risk of  Goal: *Absence of Falls  Description: Document [place] Fall Risk and appropriate interventions in the flowsheet. Outcome: Progressing Towards Goal  Note: Fall Risk Interventions:            Medication Interventions: Patient to call before getting OOB,Teach patient to arise slowly    Elimination Interventions:  Toileting schedule/hourly rounds              Problem: Patient Education: Go to Patient Education Activity  Goal: Patient/Family Education  Outcome: Progressing Towards Goal     Problem: Pain  Goal: *Control of Pain  Outcome: Progressing Towards Goal     Problem: Patient Education: Go to Patient Education Activity  Goal: Patient/Family Education  Outcome: Progressing Towards Goal     Problem: Impaired Skin Integrity/Pressure Injury Treatment  Goal: *Improvement of Existing Pressure Injury  Outcome: Progressing Towards Goal  Goal: *Prevention of pressure injury  Description: Document Giovany Scale and appropriate interventions in the flowsheet. Outcome: Progressing Towards Goal  Note: Pressure Injury Interventions:             Activity Interventions: Pressure redistribution bed/mattress(bed type),PT/OT evaluation,Increase time out of bed    Mobility Interventions: Pressure redistribution bed/mattress (bed type),PT/OT evaluation    Nutrition Interventions: Document food/fluid/supplement intake                     Problem: Patient Education: Go to Patient Education Activity  Goal: Patient/Family Education  Outcome: Progressing Towards Goal

## 2022-06-27 NOTE — Clinical Note
Sheath #1: sheath exchanged for INTRODUCER [facility] 8FR VERÓNICA L11CM DIL TIP 35MM KATIA TUNGSTEN.

## 2022-06-28 ENCOUNTER — TRANSCRIBE ORDER (OUTPATIENT)
Dept: CARDIAC CATH/INVASIVE PROCEDURES | Age: 77
End: 2022-06-28

## 2022-06-28 ENCOUNTER — APPOINTMENT (OUTPATIENT)
Dept: NON INVASIVE DIAGNOSTICS | Age: 77
DRG: 273 | End: 2022-06-28
Attending: STUDENT IN AN ORGANIZED HEALTH CARE EDUCATION/TRAINING PROGRAM
Payer: MEDICARE

## 2022-06-28 VITALS
SYSTOLIC BLOOD PRESSURE: 76 MMHG | HEIGHT: 62 IN | RESPIRATION RATE: 16 BRPM | BODY MASS INDEX: 36.8 KG/M2 | TEMPERATURE: 98 F | HEART RATE: 55 BPM | DIASTOLIC BLOOD PRESSURE: 35 MMHG | WEIGHT: 200 LBS | OXYGEN SATURATION: 95 %

## 2022-06-28 DIAGNOSIS — I48.91 AFIB (HCC): Primary | ICD-10-CM

## 2022-06-28 LAB
ANION GAP SERPL CALC-SCNC: 9 MMOL/L (ref 3–18)
BASOPHILS # BLD: 0.1 K/UL (ref 0–0.1)
BASOPHILS NFR BLD: 1 % (ref 0–2)
BUN SERPL-MCNC: 24 MG/DL (ref 7–18)
BUN/CREAT SERPL: 5 (ref 12–20)
CALCIUM SERPL-MCNC: 7.9 MG/DL (ref 8.5–10.1)
CHLORIDE SERPL-SCNC: 104 MMOL/L (ref 100–111)
CO2 SERPL-SCNC: 26 MMOL/L (ref 21–32)
CREAT SERPL-MCNC: 5.27 MG/DL (ref 0.6–1.3)
DIFFERENTIAL METHOD BLD: ABNORMAL
EOSINOPHIL # BLD: 0 K/UL (ref 0–0.4)
EOSINOPHIL NFR BLD: 0 % (ref 0–5)
ERYTHROCYTE [DISTWIDTH] IN BLOOD BY AUTOMATED COUNT: 17 % (ref 11.6–14.5)
GLUCOSE SERPL-MCNC: 101 MG/DL (ref 74–99)
HCT VFR BLD AUTO: 29.4 % (ref 35–45)
HGB BLD-MCNC: 8.9 G/DL (ref 12–16)
IMM GRANULOCYTES # BLD AUTO: 0.1 K/UL (ref 0–0.04)
IMM GRANULOCYTES NFR BLD AUTO: 2 % (ref 0–0.5)
LYMPHOCYTES # BLD: 1.5 K/UL (ref 0.9–3.6)
LYMPHOCYTES NFR BLD: 21 % (ref 21–52)
MCH RBC QN AUTO: 31.8 PG (ref 24–34)
MCHC RBC AUTO-ENTMCNC: 30.3 G/DL (ref 31–37)
MCV RBC AUTO: 105 FL (ref 78–100)
MONOCYTES # BLD: 0.7 K/UL (ref 0.05–1.2)
MONOCYTES NFR BLD: 9 % (ref 3–10)
NEUTS SEG # BLD: 4.9 K/UL (ref 1.8–8)
NEUTS SEG NFR BLD: 68 % (ref 40–73)
NRBC # BLD: 0 K/UL (ref 0–0.01)
NRBC BLD-RTO: 0 PER 100 WBC
PLATELET # BLD AUTO: 250 K/UL (ref 135–420)
PMV BLD AUTO: 10 FL (ref 9.2–11.8)
POTASSIUM SERPL-SCNC: 4.7 MMOL/L (ref 3.5–5.5)
RBC # BLD AUTO: 2.8 M/UL (ref 4.2–5.3)
SODIUM SERPL-SCNC: 139 MMOL/L (ref 136–145)
WBC # BLD AUTO: 7.2 K/UL (ref 4.6–13.2)

## 2022-06-28 PROCEDURE — 36415 COLL VENOUS BLD VENIPUNCTURE: CPT

## 2022-06-28 PROCEDURE — 80048 BASIC METABOLIC PNL TOTAL CA: CPT

## 2022-06-28 PROCEDURE — 74011000250 HC RX REV CODE- 250: Performed by: STUDENT IN AN ORGANIZED HEALTH CARE EDUCATION/TRAINING PROGRAM

## 2022-06-28 PROCEDURE — 94762 N-INVAS EAR/PLS OXIMTRY CONT: CPT

## 2022-06-28 PROCEDURE — 99024 POSTOP FOLLOW-UP VISIT: CPT | Performed by: STUDENT IN AN ORGANIZED HEALTH CARE EDUCATION/TRAINING PROGRAM

## 2022-06-28 PROCEDURE — 85025 COMPLETE CBC W/AUTO DIFF WBC: CPT

## 2022-06-28 PROCEDURE — 93308 TTE F-UP OR LMTD: CPT

## 2022-06-28 PROCEDURE — 77010033678 HC OXYGEN DAILY

## 2022-06-28 PROCEDURE — 74011250637 HC RX REV CODE- 250/637: Performed by: STUDENT IN AN ORGANIZED HEALTH CARE EDUCATION/TRAINING PROGRAM

## 2022-06-28 RX ORDER — MIDODRINE HYDROCHLORIDE 5 MG/1
5 TABLET ORAL
Qty: 90 TABLET | Refills: 0 | Status: SHIPPED | OUTPATIENT
Start: 2022-06-28 | End: 2022-07-28

## 2022-06-28 RX ORDER — AMOXICILLIN 500 MG/1
2000 CAPSULE ORAL
Qty: 4 CAPSULE | Refills: 3 | Status: SHIPPED | OUTPATIENT
Start: 2022-06-28 | End: 2022-06-28

## 2022-06-28 RX ORDER — MIDODRINE HYDROCHLORIDE 5 MG/1
5 TABLET ORAL
Qty: 90 TABLET | Refills: 0 | Status: SHIPPED | OUTPATIENT
Start: 2022-06-28 | End: 2022-06-28 | Stop reason: SDUPTHER

## 2022-06-28 RX ADMIN — SODIUM CHLORIDE, PRESERVATIVE FREE 10 ML: 5 INJECTION INTRAVENOUS at 05:22

## 2022-06-28 RX ADMIN — ACETAMINOPHEN 650 MG: 325 TABLET ORAL at 08:40

## 2022-06-28 RX ADMIN — APIXABAN 5 MG: 5 TABLET, FILM COATED ORAL at 08:39

## 2022-06-28 RX ADMIN — SEVELAMER CARBONATE 1600 MG: 800 TABLET, FILM COATED ORAL at 12:11

## 2022-06-28 RX ADMIN — DULOXETINE HYDROCHLORIDE 20 MG: 20 CAPSULE, DELAYED RELEASE ORAL at 08:39

## 2022-06-28 RX ADMIN — ACETAMINOPHEN 650 MG: 325 TABLET ORAL at 00:03

## 2022-06-28 RX ADMIN — PANTOPRAZOLE SODIUM 20 MG: 20 TABLET, DELAYED RELEASE ORAL at 08:03

## 2022-06-28 RX ADMIN — SEVELAMER CARBONATE 1600 MG: 800 TABLET, FILM COATED ORAL at 08:39

## 2022-06-28 RX ADMIN — AMIODARONE HYDROCHLORIDE 200 MG: 200 TABLET ORAL at 08:39

## 2022-06-28 RX ADMIN — MIDODRINE HYDROCHLORIDE 5 MG: 5 TABLET ORAL at 12:11

## 2022-06-28 RX ADMIN — LEVOTHYROXINE SODIUM 125 MCG: 25 TABLET ORAL at 08:03

## 2022-06-28 RX ADMIN — MIDODRINE HYDROCHLORIDE 5 MG: 5 TABLET ORAL at 05:22

## 2022-06-28 NOTE — PROGRESS NOTES
Cardiology Progress Note    Admit Date: 6/27/2022  Attending Cardiologist: Dr. Tatyana Rossi:     Hospital Problems  Date Reviewed: 5/14/2022          Codes Class Noted POA    A-fib [facility]) ICD-10-CM: I48.91  ICD-9-CM: 427.31  6/27/2022 Unknown        * (Principal) Presence of Watchman left atrial appendage closure device ICD-10-CM: Z95.818  ICD-9-CM: V45.09  6/27/2022 Unknown            Indra Deras a 64 y. o. female, morbidly obese with past medical history of hypertension, hyperlipidemia, type 2 diabetes, end-stage renal disease on hemodialysis with chronic hypotension on midodrine during dialysis, hypothyroidism on Synthroid, anemia of chronic disease, paroxysmal atrial fibrillation on chronic Eliquis with prior hematuria here for scheduled LAAC with watchman device. Plan:   1. Chronically low blood pressures which are stable. Chest pain appears to be musculoskeletal in nature but overall improved. Echocardiogram reviewed with trivial pericardial effusion and normal LV function without any significant wall motion abnormalities. 2.  Discussed endocarditis prophylaxis for the first 6 months post watchman. Amoxicillin prescription sent to pharmacy. 3.  To follow-up with her in clinic for groin check in a week. Plan for CATINA 45 days post watchman with Dr. Torin Page. 4. Plan for resuming Eliquis for the first 45 days at least with hopes to change to dual antiplatelet therapy at that point from Eliquis which would be discontinued to daily baby aspirin and Plavix. 5.  Able for discharge from a cardiac standpoint. Yemi Herrera MD, [facility]      Subjective:     No new complaints.  Still with some MSK/pleuritic right sided CP, down to 3/10 this AM    Objective:      Patient Vitals for the past 8 hrs:   Temp Pulse Resp BP SpO2   06/28/22 1000 -- (!) 57 17 (!) 90/48 97 %   06/28/22 0839 -- 73 -- (!) 79/40 --   06/28/22 0818 -- -- -- (!) 80/44 --   06/28/22 0800 97.7 °F (36.5 °C) (!) 57 19 (!) 88/43 100 %   06/28/22 0700 -- (!) 57 15 (!) 80/44 100 %   06/28/22 0530 -- 60 17 (!) 89/47 97 %   06/28/22 0430 -- (!) 58 13 (!) 72/44 100 %   06/28/22 0400 -- (!) 57 16 -- 100 %         Patient Vitals for the past 96 hrs:   Weight   06/28/22 0818 90.7 kg (200 lb)   06/27/22 0742 91 kg (200 lb 9.9 oz)       TELE: AFIB               Current Facility-Administered Medications   Medication Dose Route Frequency Last Admin    sodium chloride (NS) flush 5-40 mL  5-40 mL IntraVENous Q8H 10 mL at 06/28/22 0522    sodium chloride (NS) flush 5-40 mL  5-40 mL IntraVENous PRN      0.9% sodium chloride infusion 250 mL  250 mL IntraVENous CONTINUOUS      apixaban (ELIQUIS) tablet 5 mg  5 mg Oral Q12H 5 mg at 06/28/22 0839    dextrose 40% (GLUTOSE) oral gel 15.2 g  15.2 g Oral PRN      glucagon (GLUCAGEN) injection 1 mg  1 mg SubCUTAneous PRN      dextrose 10% infusion 0-250 mL  0-250 mL IntraVENous PRN      ondansetron (ZOFRAN) injection 4 mg  4 mg IntraVENous ONCE PRN      allopurinoL (ZYLOPRIM) tablet 100 mg  100 mg Oral Q MON, WED &  mg at 06/27/22 2330    amiodarone (CORDARONE) tablet 200 mg  200 mg Oral DAILY 200 mg at 06/28/22 0839    DULoxetine (CYMBALTA) capsule 20 mg  20 mg Oral DAILY 20 mg at 06/28/22 0839    estradioL (ESTRACE) 0.01 % (0.1 mg/gram) vaginal cream 1 g  1 g Vaginal Q MON, THU & SAT      gabapentin (NEURONTIN) capsule 100 mg  100 mg Oral  mg at 06/27/22 2330    HYDROmorphone (DILAUDID) tablet 2 mg  2 mg Oral DIALYSIS PRN      latanoprost (XALATAN) 0.005 % ophthalmic solution 1 Drop  1 Drop Both Eyes QHS 1 Drop at 06/27/22 2330    levothyroxine (SYNTHROID) tablet 125 mcg  125 mcg Oral  mcg at 06/28/22 0803    meclizine (ANTIVERT) tablet 25 mg  25 mg Oral TID PRN      melatonin tablet 5 mg  5 mg Oral QHS 5 mg at 06/27/22 2330    midodrine (PROAMATINE) tablet 5 mg  5 mg Oral TID WITH MEALS 5 mg at 06/28/22 0522    pantoprazole (PROTONIX) tablet 20 mg  20 mg Oral ACB 20 mg at 06/28/22 0803    sevelamer carbonate (RENVELA) tab 1,600 mg  1,600 mg Oral TID WITH MEALS 1,600 mg at 06/28/22 1904    acetaminophen (TYLENOL) tablet 650 mg  650 mg Oral Q6H  mg at 06/28/22 0840    lidocaine 4 % patch 1 Patch  1 Patch TransDERmal Q24H 1 Patch at 06/27/22 1741    fentaNYL citrate (PF) injection 25 mcg  25 mcg IntraVENous Q4H PRN         No intake or output data in the 24 hours ending 06/28/22 1141    Physical Exam:  General:  alert, cooperative, no distress, appears stated age  Neck:  no JVD  Lungs:  clear to auscultation bilaterally  Heart:  regular rate and rhythm, S1, S2 normal, no murmur, click, rub or gallop  Abdomen:  abdomen is soft without significant tenderness, masses, organomegaly or guarding  Extremities:  extremities normal, atraumatic, no cyanosis or edema    Visit Vitals  BP (!) 90/48   Pulse (!) 57   Temp 97.7 °F (36.5 °C)   Resp 17   Ht 5' 2\" (1.575 m)   Wt 90.7 kg (200 lb)   SpO2 97%   BMI 36.58 kg/m²       Data Review:     Labs: Results:       Chemistry Recent Labs     06/28/22  0510 06/27/22  0725   * 84    143   K 4.7 5.0    107   CO2 26 27   BUN 24* 38*   CREA 5.27* 8.18*   CA 7.9* 9.0   AGAP 9 9   BUCR 5* 5*      CBC w/Diff Recent Labs     06/28/22  0510 06/27/22  0725   WBC 7.2 6.9   RBC 2.80* 3.26*   HGB 8.9* 10.4*   HCT 29.4* 34.1*    287   GRANS 68  --    LYMPH 21  --    EOS 0  --       Cardiac Enzymes No results found for: CPK, CK, CKMMB, CKMB, RCK3, CKMBT, CKNDX, CKND1, PATTI, TROPT, TROIQ, GRAHAM, TROPT, TNIPOC, BNP, BNPP   Coagulation Recent Labs     06/27/22  0725   PTP 16.8*   INR 1.3*       Lipid Panel No results found for: CHOL, CHOLPOCT, CHOLX, CHLST, CHOLV, 025856, HDL, HDLP, LDL, LDLC, DLDLP, [id number], VLDLC, VLDL, TGLX, TRIGL, TRIGP, TGLPOCT, CHHD, CHHDX   BNP No results found for: BNP, BNPP, XBNPT   Liver Enzymes No results for input(s): TP, ALB, TBIL, AP in the last 72 hours.     No lab exists for component: SGOT, GPT, DBIL Thyroid Studies Lab Results   Component Value Date/Time    TSH 0.60 05/25/2022 09:12 AM          Signed By: Jennifer Hawkins MD     June 28, 2022

## 2022-06-28 NOTE — DISCHARGE SUMMARY
[facility]  Discharge Summary    Patient: Brittany Farias MRN: [id number]  CSN: [id number]    YOB: 1945  Age: 64 y.o. Sex: female      Admission Date: 6/27/2022 Discharge Date: 6/28/2022   Attending: Anitra Ramirez MD PCP: Marianna Koo MD     ===================================================================    Reason for Admission:   Paroxysmal atrial fibrillation ([address] [I48.0]  Presence of Watchman left atrial appendage closure device [Z95.818]  A-fib [facility]) [I48.91]    Discharge Diagnoses:   Paroxysmal Atrial fibrillation  Status Post Watchman Left Atrial Appendage Closure Device  ESRD on HD MWF  Chronic Hypotension  Hypothyroidism  DM II with neuropathy  Depression  Gout    Important notes to PCP/ follow-up studies and evaluations   - Patient will need CATINA with cardiology in 45 days  - Follow up with Cardiology for groin check in one week  - Patient was prescribed Midodrine 5 mg TID for every day use. She was previously taking the medication only on Dialysis days. Chronically low BP but necessitated increase dosage.   - Patient was also prescribed Amoxicillin for watchman prophylaxis  - Discharged on her Eliquis with plan to discontinue Eliquis after 45 days and switch to DAPT at that time  ECHO: Left Ventricle: Normal left ventricular systolic function with a visually estimated EF of 55 - 60%. Normal wall motion. Left Atrium: Device closure in the appendage with a Watchman. Trivial pericardial effusion. Pending labs and studies:  none    Operative Procedures:   Watchman GOLD closure Device with Dr Cristian Alicea and Dr Orion Garcia    Discharge Medications:     Current Discharge Medication List      START taking these medications    Details   amoxicillin (AMOXIL) 500 mg capsule Take 4 Capsules by mouth once as needed (one hour prior to dental procedures for 6 months after your watchman procedure (6/27/2022)) for up to 1 dose.   Qty: 4 Capsule, Refills: 3  Start date: 6/28/2022, End date: 6/28/2022         CONTINUE these medications which have CHANGED    Details   midodrine (PROAMATINE) 5 mg tablet Take 1 Tablet by mouth three (3) times daily (with meals) for 30 days. Qty: 90 Tablet, Refills: 0  Start date: 6/28/2022, End date: 7/28/2022         CONTINUE these medications which have NOT CHANGED    Details   gabapentin (NEURONTIN) 100 mg capsule Take 1 Capsule by mouth nightly. Max Daily Amount: 100 mg. Indications: concern for back pain post dialysis  Qty: 30 Capsule, Refills: 0    Associated Diagnoses: Mononeuropathy      melatonin 5 mg tablet Take 5 mg by mouth nightly. allopurinoL (ZYLOPRIM) 100 mg tablet Take 1 Tablet by mouth every Monday, Wednesday, Friday. [after hemodialysis]  Indications: treatment to prevent acute gout attack  Qty: 12 Tablet, Refills: 0    Associated Diagnoses: Uric acid nephrolithiasis      amiodarone (CORDARONE) 200 mg tablet Take 1 Tablet by mouth daily. Indications: prevention of recurrent atrial fibrillation  Qty: 30 Tablet, Refills: 0    Associated Diagnoses: Paroxysmal atrial fibrillation (HCC)      sevelamer carbonate (RENVELA) 800 mg tab tab Take 2 Tablets by mouth three (3) times daily (with meals). Indications: renal osteodystrophy with hyperphosphatemia  Qty: 180 Tablet, Refills: 0    Associated Diagnoses: Secondary hyperparathyroidism of renal origin ([facility] [address] End-stage renal disease on hemodialysis ([facility] [address] Hyperphosphatemia      acetaminophen (Tylenol Extra Strength) 500 mg tablet Take 1 Tablet by mouth every six (6) hours as needed for Pain. Qty: 30 Tablet, Refills: 0      apixaban (ELIQUIS) 5 mg tablet Take 1 Tablet by mouth two (2) times a day. Qty: 60 Tablet, Refills: 0      meclizine (ANTIVERT) 25 mg tablet Take 25 mg by mouth three (3) times daily as needed for Dizziness. methoxy peg-epoetin beta (MIRCERA INJECTION) 30 mcg by IntraVENous route. in HD      DULoxetine (Cymbalta) 20 mg capsule Take 20 mg by mouth daily.       biotin 1,000 mcg chew Take 1 Tab by mouth daily. cyanocobalamin 1,000 mcg tablet Take 1,000 mcg by mouth daily. lactobacillus sp. 50 billion cpu (BIO-K PLUS) 50 billion cell -375 mg cap capsule Take 1 Cap by mouth daily. Qty: 30 Cap, Refills: 2      ascorbic acid, vitamin C, (VITAMIN C) 500 mg tablet Take 500 mg by mouth daily. cholecalciferol (VITAMIN D3) (2,000 UNITS /50 MCG) cap capsule Take 2,000 Units by mouth two (2) times a day. latanoprost (XALATAN) 0.005 % ophthalmic solution Administer 1 Drop to both eyes nightly. One drop at bedtime      levothyroxine (SYNTHROID) 125 mcg tablet Take 125 mcg by mouth Daily (before breakfast). omeprazole (PRILOSEC) 20 mg capsule Take 20 mg by mouth daily. ondansetron (ZOFRAN ODT) 4 mg disintegrating tablet Take 4 mg by mouth every eight (8) hours as needed for Nausea or Vomiting. vit B Cmplx 3-FA-Vit C-Biotin (NEPHRO HOWIE RX) 1- mg-mg-mcg tablet Take 1 Tab by mouth daily. HYDROmorphone (DILAUDID) 2 mg tablet Take 1 mg by mouth every eight (8) hours as needed for Pain. Take 1/2 tablet by mouth every 8 hours as needed for pain level of 5 out of 10 or greater      lidocaine (LIDODERM) 5 % Apply patch to the affected area for 12 hours a day and remove for 12 hours a day. Qty: 1 Each, Refills: 0      estradioL (Estrace) 0.01 % (0.1 mg/gram) vaginal cream Apply a fingertip amount around the urethra three times a week.   Qty: 30 g, Refills: 3         STOP taking these medications       ondansetron hcl (ZOFRAN) 4 mg tablet Comments:   Reason for Stopping:         sodium zirconium cyclosilicate (LOKELMA) 5 gram powder packet Comments:   Reason for Stopping:         doxercalciferol (HECTOROL IV) Comments:   Reason for Stopping:         calcitRIOL (ROCALTROL) 0.25 mcg capsule Comments:   Reason for Stopping:               Disposition: Home    Consultants:    Cardiology   Nephrology for HD    [facility] Course (including pertinent history and physical findings)  Viji Lanitgua a 64 y. o. female, morbidly obese with past medical history of hypertension, hyperlipidemia, type 2 diabetes, end-stage renal disease on hemodialysis with chronic hypotension on midodrine during dialysis, hypothyroidism on Synthroid, anemia of chronic disease, paroxysmal atrial fibrillation on chronic Eliquis with prior hematuria admitted for [facility] with watchman device. Patient underwent procedure on 6/27 and tolerated well. Patient was watched overnight and discharged on 6/28. Patient did have hypotension and her midodrine order was increased to Midodrine 5 mg TID, whereas she previously was taking this only on her dialysis days. Patient received HD on 6/27 and will be due on 6/29. Patient was also given prescription for Amoxicillin for prophylaxis on discharge. Management of patient's other health issues during this hospitalization are described in more detail below:    CURRENT ADMISSION IMAGING RESULTS   No results found. Cardiology Procedures/Testing:  MODALITY RESULTS   EKG Results for orders placed or performed during the hospital encounter of 06/27/22   EKG, 12 LEAD, INITIAL   Result Value Ref Range    Ventricular Rate 57 BPM    QRS Duration 104 ms    Q-T Interval 476 ms    QTC Calculation (Bezet) 463 ms    Calculated R Axis -152 degrees    Calculated T Axis 145 degrees    Diagnosis       Sinus bradycardia  Right superior axis deviation  Abnormal ECG  When compared with ECG of 27-JUN-2022 07:19,  Sinus bradycardia has replaced Atrial fibrillation  Confirmed by Aarti Wilson MD, ----- ([id number]) on 6/27/2022 4:33:17 PM         ECHO 06/27/22    ECHO ADULT FOLLOW-UP OR LIMITED 06/28/2022 6/28/2022    Interpretation Summary    Left Ventricle: Normal left ventricular systolic function with a visually estimated EF of 55 - 60%. Normal wall motion.   Left Atrium: Device closure in the appendage with a Watchman.   Trivial pericardial effusion.     Signed by: Oscar Zaragoza MD on 6/28/2022 10:38 AM     IR Results from Hospital Encounter encounter on 10/14/20    IR NEPHROSTOMY PERC RT PLC CATH  SI    Impression  IMPRESSION:    Technically successful nephrostogram.    Technically successful fluoroscopy assisted exchange for 8 [place] double-J  catheter, right side. Moderate sedation provided for the procedure, 30 minutes      Results from [place] encounter on 07/15/20    IR EXCHANGE NEPHRO PERC RT SI    Impression  IMPRESSION:    Successful, uncomplicated right diagnostic antegrade nephrostogram with  antegrade nephroureteral stent exchange. Proximal and distal renal collecting  systems completely decompressed. Free flow contrast media in the urinary  bladder. No debris. No hydronephrosis or hydroureter. Plan: Patient should come back interventional radiology department at  approximately 8 weeks for routine exchange of the right nephroureteral stent. Results from [place] encounter on 04/13/20    IR NEPHROURETERAL PERC RT PLC CATH NEW ACCESS  SI    Impression  IMPRESSION:    Successful uncomplicated right nephrostomy tube removal, high-grade distal right  ureteral stricture recanalization, right ureteral high-grade stricture balloon  ureteroplasty as well as placement of the new right nephroureteral catheter. Plan: Patient will come back to interventional radiology in approximately 4  weeks for detailed antegrade nephrostogram and possible nephroureteral catheter  removal. If stricture persists however internalization with double-J stent will  be considered. CATH 05/25/22    CARDIAC PROCEDURE 05/25/2022 5/25/2022    Conclusion  · No obstructive coronary artery disease. · Mildly elevated filling pressures, mild pulmonary hypertension and normal cardiac output. · Mildly elevated LVEDP. Right radial arterial and right common femoral venous access with 6Fr. Sheaths. Sheaths removed with radial band in place and hemostasis with vascade at [facility]. Wean radial band per protocol. Flat bedrest for 1hr post procedure. Signed by: Chin Oglesby MD on 5/25/2022  3:31 PM       Special Testing/Procedures:  MODALITY RESULTS   MICRO All Micro Results     Procedure Component Value Units Date/Time    COVID-19 RAPID TEST [[id number]] Collected: 06/27/22 0715    Order Status: Completed Specimen: Nasopharyngeal Updated: 06/27/22 0749     Specimen source Nasopharyngeal        COVID-19 rapid test Not detected        Comment: Rapid Abbott ID Now       Rapid NAAT:  The specimen is NEGATIVE for SARS-CoV-2, the novel coronavirus associated with COVID-19. Negative results should be treated as presumptive and, if inconsistent with clinical signs and symptoms or necessary for patient management, should be tested with an alternative molecular assay. Negative results do not preclude SARS-CoV-2 infection and should not be used as the sole basis for patient management decisions. This test has been authorized by the FDA under an Emergency Use Authorization (EUA) for use by authorized laboratories.    Fact sheet for Healthcare Providers: ConventionUpdate.co.nz  Fact sheet for Patients: ConventionUpdate.co.nz       Methodology: Isothermal Nucleic Acid Amplification              ABG No results found for: PH, PHI, PCO2, PCO2I, PO2, PO2I, HCO3, HCO3I, FIO2, FIO2I   UA Results for orders placed or performed in visit on 02/19/21   AMB POC URINALYSIS DIP STICK AUTO W/O MICRO     Status: None   Result Value Ref Range Status    Color (UA POC) Dark Yellow  Final     Comment: Milky    Clarity (UA POC) Turbid  Final    Glucose (UA POC) Negative Negative Final    Bilirubin (UA POC) Negative Negative Final    Ketones (UA POC) Negative Negative Final    Specific gravity (UA POC) 1.025 1.001 - 1.035 Final    Blood (UA POC) 3+ Negative Final    pH (UA POC) 7.5 4.6 - 8.0 Final    Protein (UA POC) 3+ Negative Final    Urobilinogen (UA POC) 0.2 mg/dL 0.2 - 1 Final    Nitrites (UA POC) Negative Negative Final    Leukocyte esterase (UA POC) 3+ Negative Final        Laboratory Results:  LABORATORY RESULTS   HEMATOLOGY Lab Results   Component Value Date/Time    WBC 7.2 06/28/2022 05:10 AM    Hemoglobin, POC 8.8 (L) 09/24/2020 08:45 AM    HGB 8.9 (L) 06/28/2022 05:10 AM    Hematocrit, POC 26 (L) 09/24/2020 08:45 AM    HCT 29.4 (L) 06/28/2022 05:10 AM    PLATELET 404 13/80/5334 05:10 AM    .0 (H) 06/28/2022 05:10 AM       CHEMISTRIES Lab Results   Component Value Date/Time    Sodium 139 06/28/2022 05:10 AM    Potassium 4.7 06/28/2022 05:10 AM    Chloride 104 06/28/2022 05:10 AM    CO2 26 06/28/2022 05:10 AM    Anion gap 9 06/28/2022 05:10 AM    Glucose 101 (H) 06/28/2022 05:10 AM    BUN 24 (H) 06/28/2022 05:10 AM    Creatinine 5.27 (H) 06/28/2022 05:10 AM    BUN/Creatinine ratio 5 (L) 06/28/2022 05:10 AM    GFR est AA 10 (L) 06/28/2022 05:10 AM    GFR est non-AA 8 (L) 06/28/2022 05:10 AM    Calcium 7.9 (L) 06/28/2022 05:10 AM      HEPATIC FUNCTION Lab Results   Component Value Date/Time    Albumin 3.3 (L) 05/27/2022 09:41 AM    Bilirubin, total 0.3 05/27/2022 09:41 AM    Protein, total 7.5 05/27/2022 09:41 AM    Globulin 4.2 (H) 05/27/2022 09:41 AM    A-G Ratio 0.8 05/27/2022 09:41 AM    ALT (SGPT) 20 05/27/2022 09:41 AM    Alk.  phosphatase 114 05/27/2022 09:41 AM       LACTIC ACID Lab Results   Component Value Date/Time    Lactic acid 1.1 05/15/2020 08:29 AM      CARDIAC PANEL Lab Results   Component Value Date/Time    Troponin-I, QT <0.02 11/12/2021 03:20 PM      NT-proBNP Lab Results   Component Value Date/Time    NT pro-BNP 5,643 (H) 10/08/2021 03:50 PM      THYROID Lab Results   Component Value Date/Time    TSH 0.60 05/25/2022 09:12 AM    T4, Free 1.5 05/25/2022 09:12 AM        Functional status and cognitive function:    Ambulates with: Walker  Status: alert, cooperative, no distress, appears stated age  Condition: STABLE    Physical exam on day of discharge: General:  AAOx3, NAD   HEENT: Conjunctiva pink, sclera anicteric. PERRL. EOMI. Pharynx moist, nonerythematous. Moist mucous membranes. CV: Sinus bradycardia, no murmurs. No visible pulsations or thrills. RESP:  Unlabored breathing. Lungs clear to auscultation without adventitious breath sounds. Equal expansion bilaterally. ABD:  Soft, nontender, nondistended. BS (+). MS:  No joint deformity or instability. No atrophy. Neuro:  CN II-XII grossly intact. 5/5 strength bilateral upper extremities and lower extremities. Ext:  No edema. 2+ radial and dp pulses bilaterally. Skin:  No rashes, lesions, or ulcers. Good turgor. Code status and advanced care plan: Full    Point of Contact Romario Leigh (Son)   [phone] (Home Phone)     Patient Education:  Patient was educated on the following topics prior to discharge: Watchman    RISK CALCULATORS:  SCORE RESULT   READMISSION RISK SCORE High Risk            33 Total Score    9 IP Visits Last 12 Months (1-3=4, 4=9, >4=11)    5 Pt. Coverage (Medicare=5 , Medicaid, or Self-Pay=4)    19 Charlson Comorbidity Score (Age + Comorbid Conditions)        Criteria that do not apply:    Has Seen PCP in Last 6 Months (Yes=3, No=0)    . Living with Significant Other. Assisted Living. LTAC. SNF.  or   Rehab    Patient Length of Stay (>5 days = 3)         Follow-up:   Follow-up Information     Follow up With Specialties Details Why Contact Info    Shane Do, [address]   [facility] [address]          =================================================================    Ramon Stiles DO, PGY-2   [facility]   June 28, 2022, 1:09 PM

## 2022-06-28 NOTE — PROGRESS NOTES
[facility]  Discharge Summary    Patient: Kendra Combs MRN: [id number]  CSN: [id number]    YOB: 1945  Age: 64 y.o. Sex: female      Admission Date: 6/27/2022 Discharge Date: 6/28/2022   Attending: Kendy Dalton MD PCP: Shannan Garcia MD     ===================================================================    Reason for Admission:   Paroxysmal atrial fibrillation ([facility] [address] [I48.0]  Presence of Watchman left atrial appendage closure device [Z95.818]  [facility]) [I48.91]    Discharge Diagnoses:   Paroxysmal Atrial fibrillation  Status Post Watchman Left Atrial Appendage Closure Device  ESRD on HD MWF  Chronic Hypotension  Hypothyroidism  DM II with neuropathy  Depression  Gout    Important notes to PCP/ follow-up studies and evaluations   - Patient will need CATINA with cardiology in 45 days  - Follow up with Cardiology for groin check in one week  - Patient was prescribed Midodrine 5 mg TID for every day use. She was previously taking the medication only on Dialysis days. Chronically low BP but necessitated increase dosage.   - Patient was also prescribed Amoxicillin for watchman prophylaxis  - Discharged on her Eliquis with plan to discontinue Eliquis after 45 days and switch to DAPT at that time  ECHO: Left Ventricle: Normal left ventricular systolic function with a visually estimated EF of 55 - 60%. Normal wall motion. Left Atrium: Device closure in the appendage with a Watchman. Trivial pericardial effusion. Pending labs and studies:  none    Operative Procedures:   Watchman GOLD closure Device with Dr Zac Waggoner and Dr Miles Fry    Discharge Medications:     Current Discharge Medication List      START taking these medications    Details   amoxicillin (AMOXIL) 500 mg capsule Take 4 Capsules by mouth once as needed (one hour prior to dental procedures for 6 months after your watchman procedure (6/27/2022)) for up to 1 dose.   Qty: 4 Capsule, Refills: 3  Start date: 6/28/2022, End date: 6/28/2022         CONTINUE these medications which have CHANGED    Details   midodrine (PROAMATINE) 5 mg tablet Take 1 Tablet by mouth three (3) times daily (with meals) for 30 days. Qty: 90 Tablet, Refills: 0  Start date: 6/28/2022, End date: 7/28/2022         CONTINUE these medications which have NOT CHANGED    Details   gabapentin (NEURONTIN) 100 mg capsule Take 1 Capsule by mouth nightly. Max Daily Amount: 100 mg. Indications: concern for back pain post dialysis  Qty: 30 Capsule, Refills: 0    Associated Diagnoses: Mononeuropathy      melatonin 5 mg tablet Take 5 mg by mouth nightly. allopurinoL (ZYLOPRIM) 100 mg tablet Take 1 Tablet by mouth every Monday, Wednesday, Friday. [after hemodialysis]  Indications: treatment to prevent acute gout attack  Qty: 12 Tablet, Refills: 0    Associated Diagnoses: Uric acid nephrolithiasis      amiodarone (CORDARONE) 200 mg tablet Take 1 Tablet by mouth daily. Indications: prevention of recurrent atrial fibrillation  Qty: 30 Tablet, Refills: 0    Associated Diagnoses: Paroxysmal atrial fibrillation (HCC)      sevelamer carbonate (RENVELA) 800 mg tab tab Take 2 Tablets by mouth three (3) times daily (with meals). Indications: renal osteodystrophy with hyperphosphatemia  Qty: 180 Tablet, Refills: 0    Associated Diagnoses: Secondary hyperparathyroidism of renal origin ([facility] [address] End-stage renal disease on hemodialysis ([facility] [address] Hyperphosphatemia      acetaminophen (Tylenol Extra Strength) 500 mg tablet Take 1 Tablet by mouth every six (6) hours as needed for Pain. Qty: 30 Tablet, Refills: 0      apixaban (ELIQUIS) 5 mg tablet Take 1 Tablet by mouth two (2) times a day. Qty: 60 Tablet, Refills: 0      meclizine (ANTIVERT) 25 mg tablet Take 25 mg by mouth three (3) times daily as needed for Dizziness. methoxy peg-epoetin beta (MIRCERA INJECTION) 30 mcg by IntraVENous route. in HD      DULoxetine (Cymbalta) 20 mg capsule Take 20 mg by mouth daily.       biotin 1,000 mcg chew Take 1 Tab by mouth daily. cyanocobalamin 1,000 mcg tablet Take 1,000 mcg by mouth daily. lactobacillus sp. 50 billion cpu (BIO-K PLUS) 50 billion cell -375 mg cap capsule Take 1 Cap by mouth daily. Qty: 30 Cap, Refills: 2      ascorbic acid, vitamin C, (VITAMIN C) 500 mg tablet Take 500 mg by mouth daily. cholecalciferol (VITAMIN D3) (2,000 UNITS /50 MCG) cap capsule Take 2,000 Units by mouth two (2) times a day. latanoprost (XALATAN) 0.005 % ophthalmic solution Administer 1 Drop to both eyes nightly. One drop at bedtime      levothyroxine (SYNTHROID) 125 mcg tablet Take 125 mcg by mouth Daily (before breakfast). omeprazole (PRILOSEC) 20 mg capsule Take 20 mg by mouth daily. ondansetron (ZOFRAN ODT) 4 mg disintegrating tablet Take 4 mg by mouth every eight (8) hours as needed for Nausea or Vomiting. vit B Cmplx 3-FA-Vit C-Biotin (NEPHRO HOWIE RX) 1- mg-mg-mcg tablet Take 1 Tab by mouth daily. HYDROmorphone (DILAUDID) 2 mg tablet Take 1 mg by mouth every eight (8) hours as needed for Pain. Take 1/2 tablet by mouth every 8 hours as needed for pain level of 5 out of 10 or greater      lidocaine (LIDODERM) 5 % Apply patch to the affected area for 12 hours a day and remove for 12 hours a day. Qty: 1 Each, Refills: 0      estradioL (Estrace) 0.01 % (0.1 mg/gram) vaginal cream Apply a fingertip amount around the urethra three times a week.   Qty: 30 g, Refills: 3         STOP taking these medications       ondansetron hcl (ZOFRAN) 4 mg tablet Comments:   Reason for Stopping:         sodium zirconium cyclosilicate (LOKELMA) 5 gram powder packet Comments:   Reason for Stopping:         doxercalciferol (HECTOROL IV) Comments:   Reason for Stopping:         calcitRIOL (ROCALTROL) 0.25 mcg capsule Comments:   Reason for Stopping:               Disposition: Home    Consultants:    Cardiology   Nephrology for HD    [address] [facility] Course (including pertinent history and physical findings)  Dmitry Griffith a 64 y. o. female, morbidly obese with past medical history of hypertension, hyperlipidemia, type 2 diabetes, end-stage renal disease on hemodialysis with chronic hypotension on midodrine during dialysis, hypothyroidism on Synthroid, anemia of chronic disease, paroxysmal atrial fibrillation on chronic Eliquis with prior hematuria admitted for [facility] with watchman device. Patient underwent procedure on 6/27 and tolerated well. Patient was watched overnight and discharged on 6/28. Patient did have hypotension and her midodrine order was increased to Midodrine 5 mg TID, whereas she previously was taking this only on her dialysis days. Patient received HD on 6/27 and will be due on 6/29. Patient was also given prescription for Amoxicillin for prophylaxis on discharge. Management of patient's other health issues during this hospitalization are described in more detail below:    CURRENT ADMISSION IMAGING RESULTS   No results found. Cardiology Procedures/Testing:  MODALITY RESULTS   EKG Results for orders placed or performed during the hospital encounter of 06/27/22   EKG, 12 LEAD, INITIAL   Result Value Ref Range    Ventricular Rate 57 BPM    QRS Duration 104 ms    Q-T Interval 476 ms    QTC Calculation (Bezet) 463 ms    Calculated R Axis -152 degrees    Calculated T Axis 145 degrees    Diagnosis       Sinus bradycardia  Right superior axis deviation  Abnormal ECG  When compared with ECG of 27-JUN-2022 07:19,  Sinus bradycardia has replaced Atrial fibrillation  Confirmed by Addie Fonseca MD, ----- ([id number]) on 6/27/2022 4:33:17 PM         ECHO 06/27/22    ECHO ADULT FOLLOW-UP OR LIMITED 06/28/2022 6/28/2022    Interpretation Summary    Left Ventricle: Normal left ventricular systolic function with a visually estimated EF of 55 - 60%. Normal wall motion.   Left Atrium: Device closure in the appendage with a Watchman.   Trivial pericardial effusion.     Signed by: Sandeep Meade MD on 6/28/2022 10:38 AM     IR Results from Hospital Encounter encounter on 10/14/20    IR NEPHROSTOMY PERC RT PLC CATH  SI    Impression  IMPRESSION:    Technically successful nephrostogram.    Technically successful fluoroscopy assisted exchange for 8 Gabonese double-J  catheter, right side. Moderate sedation provided for the procedure, 30 minutes      Results from [place] encounter on 07/15/20    IR EXCHANGE NEPHRO PERC RT SI    Impression  IMPRESSION:    Successful, uncomplicated right diagnostic antegrade nephrostogram with  antegrade nephroureteral stent exchange. Proximal and distal renal collecting  systems completely decompressed. Free flow contrast media in the urinary  bladder. No debris. No hydronephrosis or hydroureter. Plan: Patient should come back interventional radiology department at  approximately 8 weeks for routine exchange of the right nephroureteral stent. Results from [place] encounter on 04/13/20    IR NEPHROURETERAL PERC RT PLC CATH NEW ACCESS  SI    Impression  IMPRESSION:    Successful uncomplicated right nephrostomy tube removal, high-grade distal right  ureteral stricture recanalization, right ureteral high-grade stricture balloon  ureteroplasty as well as placement of the new right nephroureteral catheter. Plan: Patient will come back to interventional radiology in approximately 4  weeks for detailed antegrade nephrostogram and possible nephroureteral catheter  removal. If stricture persists however internalization with double-J stent will  be considered. CATH 05/25/22    CARDIAC PROCEDURE 05/25/2022 5/25/2022    Conclusion  · No obstructive coronary artery disease. · Mildly elevated filling pressures, mild pulmonary hypertension and normal cardiac output. · Mildly elevated LVEDP. Right radial arterial and right common femoral venous access with 6Fr. Sheaths. Sheaths removed with radial band in place and hemostasis with vascade at [facility]. Wean radial band per protocol. Flat bedrest for 1hr post procedure. Signed by: Maonj Simmons MD on 5/25/2022  3:31 PM       Special Testing/Procedures:  MODALITY RESULTS   MICRO All Micro Results     Procedure Component Value Units Date/Time    COVID-19 RAPID TEST [[id number]] Collected: 06/27/22 0715    Order Status: Completed Specimen: Nasopharyngeal Updated: 06/27/22 0749     Specimen source Nasopharyngeal        COVID-19 rapid test Not detected        Comment: Rapid Abbott ID Now       Rapid NAAT:  The specimen is NEGATIVE for SARS-CoV-2, the novel coronavirus associated with COVID-19. Negative results should be treated as presumptive and, if inconsistent with clinical signs and symptoms or necessary for patient management, should be tested with an alternative molecular assay. Negative results do not preclude SARS-CoV-2 infection and should not be used as the sole basis for patient management decisions. This test has been authorized by the FDA under an Emergency Use Authorization (EUA) for use by authorized laboratories.    Fact sheet for Healthcare Providers: ConventionSendiodate.co.nz  Fact sheet for Patients: ConventionSendiodate.co.nz       Methodology: Isothermal Nucleic Acid Amplification              ABG No results found for: PH, PHI, PCO2, PCO2I, PO2, PO2I, HCO3, HCO3I, FIO2, FIO2I   UA Results for orders placed or performed in visit on 02/19/21   AMB POC URINALYSIS DIP STICK AUTO W/O MICRO     Status: None   Result Value Ref Range Status    Color (UA POC) Dark Yellow  Final     Comment: Milky    Clarity (UA POC) Turbid  Final    Glucose (UA POC) Negative Negative Final    Bilirubin (UA POC) Negative Negative Final    Ketones (UA POC) Negative Negative Final    Specific gravity (UA POC) 1.025 1.001 - 1.035 Final    Blood (UA POC) 3+ Negative Final    pH (UA POC) 7.5 4.6 - 8.0 Final    Protein (UA POC) 3+ Negative Final    Urobilinogen (UA POC) 0.2 mg/dL 0.2 - 1 Final    Nitrites (UA POC) Negative Negative Final    Leukocyte esterase (UA POC) 3+ Negative Final        Laboratory Results:  LABORATORY RESULTS   HEMATOLOGY Lab Results   Component Value Date/Time    WBC 7.2 06/28/2022 05:10 AM    Hemoglobin, POC 8.8 (L) 09/24/2020 08:45 AM    HGB 8.9 (L) 06/28/2022 05:10 AM    Hematocrit, POC 26 (L) 09/24/2020 08:45 AM    HCT 29.4 (L) 06/28/2022 05:10 AM    PLATELET 019 90/34/2187 05:10 AM    .0 (H) 06/28/2022 05:10 AM       CHEMISTRIES Lab Results   Component Value Date/Time    Sodium 139 06/28/2022 05:10 AM    Potassium 4.7 06/28/2022 05:10 AM    Chloride 104 06/28/2022 05:10 AM    CO2 26 06/28/2022 05:10 AM    Anion gap 9 06/28/2022 05:10 AM    Glucose 101 (H) 06/28/2022 05:10 AM    BUN 24 (H) 06/28/2022 05:10 AM    Creatinine 5.27 (H) 06/28/2022 05:10 AM    BUN/Creatinine ratio 5 (L) 06/28/2022 05:10 AM    GFR est AA 10 (L) 06/28/2022 05:10 AM    GFR est non-AA 8 (L) 06/28/2022 05:10 AM    Calcium 7.9 (L) 06/28/2022 05:10 AM      HEPATIC FUNCTION Lab Results   Component Value Date/Time    Albumin 3.3 (L) 05/27/2022 09:41 AM    Bilirubin, total 0.3 05/27/2022 09:41 AM    Protein, total 7.5 05/27/2022 09:41 AM    Globulin 4.2 (H) 05/27/2022 09:41 AM    A-G Ratio 0.8 05/27/2022 09:41 AM    ALT (SGPT) 20 05/27/2022 09:41 AM    Alk.  phosphatase 114 05/27/2022 09:41 AM       LACTIC ACID Lab Results   Component Value Date/Time    Lactic acid 1.1 05/15/2020 08:29 AM      CARDIAC PANEL Lab Results   Component Value Date/Time    Troponin-I, QT <0.02 11/12/2021 03:20 PM      NT-proBNP Lab Results   Component Value Date/Time    NT pro-BNP 5,643 (H) 10/08/2021 03:50 PM      THYROID Lab Results   Component Value Date/Time    TSH 0.60 05/25/2022 09:12 AM    T4, Free 1.5 05/25/2022 09:12 AM        Functional status and cognitive function:    Ambulates with: Walker  Status: alert, cooperative, no distress, appears stated age  Condition: STABLE    Physical exam on day of discharge: General:  AAOx3, NAD   HEENT: Conjunctiva pink, sclera anicteric. PERRL. EOMI. Pharynx moist, nonerythematous. Moist mucous membranes. CV: Sinus bradycardia, no murmurs. No visible pulsations or thrills. RESP:  Unlabored breathing. Lungs clear to auscultation without adventitious breath sounds. Equal expansion bilaterally. ABD:  Soft, nontender, nondistended. BS (+). MS:  No joint deformity or instability. No atrophy. Neuro:  CN II-XII grossly intact. 5/5 strength bilateral upper extremities and lower extremities. Ext:  No edema. 2+ radial and dp pulses bilaterally. Skin:  No rashes, lesions, or ulcers. Good turgor. Code status and advanced care plan: Full    Point of Contact Santiago Gutiérrez (Son)   [phone] (Home Phone)     Patient Education:  Patient was educated on the following topics prior to discharge: Watchman    RISK CALCULATORS:  SCORE RESULT   READMISSION RISK SCORE High Risk            33 Total Score    9 IP Visits Last 12 Months (1-3=4, 4=9, >4=11)    5 Pt. Coverage (Medicare=5 , Medicaid, or Self-Pay=4)    19 Charlson Comorbidity Score (Age + Comorbid Conditions)        Criteria that do not apply:    Has Seen PCP in Last 6 Months (Yes=3, No=0)    . Living with Significant Other. Assisted Living. LTAC. SNF.  or   Rehab    Patient Length of Stay (>5 days = 3)         Follow-up:   Follow-up Information     Follow up With Specialties Details Why Contact Info    Miladis Garza, [address]   [place] [id number] [facility] [address]          =================================================================    Ney Ang DO, PGY-2   [facility]   June 28, 2022, 1:09 PM

## 2022-06-28 NOTE — ACP (ADVANCE CARE PLANNING)
Advance Care Planning   Advance Care Planning Inpatient Note  [address] [facility] Department    Today's Date: 6/28/2022  Unit: [facility] CV INTNS[facility] CARE    Received request from admission screening. Upon review of chart and communication with care team, patient's decision making abilities are not in question. Patient was/were present in the room during visit. Goals of ACP Conversation:  Discuss Advance Care planning documents    Health Care Decision Makers:      Primary Decision Maker: Ashia Funes - [phone]    Secondary Decision Maker: Anu Telles - Daughter-in-Law - [phone]      Summary:  Verified Documents    Advance Care Planning Documents (Patient Wishes) on file:  Healthcare Power of /Advance Directive appointment of Health care agent     Assessment:    Patient already has a scanned Advance Medical Directive in the chart. See Adv Care Plan.       Interventions:  Reviewed but did not complete ACP document    Care Preferences Communicated:  No    Outcomes/Plan:  ACP Discussion Completed    [facility] on 6/28/2022 at 1:45 PM

## 2022-06-28 NOTE — DIALYSIS
ACUTE HEMODIALYSIS FLOW SHEET    HEMODIALYSIS ORDERS: Physician: Dr. Jayne Mahan     Dialyzer: Revaclear   Duration: 3 hr   BFR: 350  DFR: 700   Dialysate:  Temp 36-37*C   K+  2    Ca+ 2.0   Na 138   Bicarb 30   Wt Readings from Last 1 Encounters:   06/27/22 91 kg (200 lb 9.9 oz)    Patient Chart [x]   Unable to Obtain []  Dry weight/UF Goal: 1500 ml    Heparin []  Bolus    Units    [] Hourly    Units    [x]None       Pre BP: 106/56  Pulse: 51  Respirations: 16 Temp: 97.5 temporal  [] Oral  [] Ax  [] Esoph   Labs: []  Pre  []  Post:   [x] N/A   Additional Orders (medications, blood products, hypotension management): [] Yes   [] No     [x]  [facility] Consent Verified     CATHETER ACCESS:  []N/A   []Right   []Left   []IJ   []Fem  []Chest wall  []TransHepatic   [] First use X-ray verified     []Tunnel    [] Non Tunneled   []No S/S infection  []Redness  []Drainage []Cultured []Swelling []Pain   []Medical Aseptic Prep Utilized   []Dressing Changed  [] Biopatch  Date:    []Clotted   []Patent   Flows: []Good  []Poor  []Reversed   If access problem,  notified: []Yes    []N/A        GRAFT/FISTULA ACCESS:   []N/A     []Right     []Left     []UE     []LE   []AVG   []AVF       []Medical Aseptic Prep Utilized   []No S/S infection  []Redness  []Drainage [] Cultured  [] Swelling  [] Pain  Bruit:   [] Strong    [] Weak       Thrill :   [] Strong    [] Weak     Needle Gauge: 15   Length: 1 inch   If access problem,  notified: []Yes     [x]N/A          GENERAL ASSESSMENT:    LUNGS:  Resp Rate 16   [x] Clear  [] Coarse  [] Crackles  [] Wheezing  [] Diminished                                                           [] RLL   [] LLL  [] RUL   [] LALI            Respirations:  [x]Easy  []Labored  []N/A  Cough:  [x]Productive  []Dry  []N/A               Therapy:  []RA   [] Ventilated   [] Intubated   [] Trach            O2 Device:  [x] NC   [] NRB  [] Trach Mask  [] BiPaP  Flow:  4 l/min CARDIAC: [] Regular      [x] Irregular   [] Rhythm:          [x] Monitored   [] Bedside   [] Remotely monitored       EDEMA: [x] None   []Generalized  [] Pitting [] 1+   [] 2 +   [] 3+    [] 4+        SKIN:   [] Hot     [] Cold    [x] Warm   [x] Dry    [] Diaphoretic                 [] Flushed  [] Jaundiced  [] Cyanotic  [] Pale      LOC:    [x] Alert      [x]Oriented:    [x] Person     [x] Place   [x]Time               [] Confused  [] Lethargic  [] Medicated  [] Non-responsive  [] Non-Verbal     GI / ABDOMEN:                     [] Flat    [] Distended    [x] Soft    [] Firm   []  Obese                   [] Diarrhea   [] FMS [] Bowel Sounds  [] Nausea  [] Vomiting                   [] NGT  [] OGT  [] PEG  [] Tube Feedings @     mL/hr     / URINE ASSESSMENT:                   [] Voiding    [x] Oliguria  [] Anuria                     []  Cline   [] Incontinent  []  Incontinent Brief   []  PureWick     PAIN:  [x] 0 []1  []2   []3   []4   []5   []6   []7   []8   []9   []10                MOBILITY:  [x] Bed    [] Stretcher      All Vitals and Treatment Details on Attached [address]: [facility]          Room # 5442/01    [x] Routine         [] 1st Time Acute/Chronic   [] Urgent      [] Stat            [] Acute Room   []  Bedside    [x] ICU/CCU     [] ER     Isolation Precautions:  [x] Dialysis    Contact     ALLERGIES:     Allergies   Allergen Reactions    Albumin, Human 25 % Itching     Headache - severe migraine like, itchy eyes, runny nose    Ciprofloxacin Hives    Cyclopentolate Unknown (comments)    Iron Sucrose Diarrhea    Statins-Hmg-Coa Reductase Inhibitors Other (comments)     Body ache        Code Status:  Full Code     Hepatitis Status      Lab Results   Component Value Date/Time    Hepatitis B surface Ag <0.10 11/17/2021 02:20 PM    Hepatitis B surface Ab 29.33 11/17/2021 02:20 PM        Current Labs:      Lab Results   Component Value Date/Time    WBC 6.9 06/27/2022 07:25 AM Hemoglobin, POC 8.8 (L) 09/24/2020 08:45 AM    HGB 10.4 (L) 06/27/2022 07:25 AM    Hematocrit, POC 26 (L) 09/24/2020 08:45 AM    HCT 34.1 (L) 06/27/2022 07:25 AM    PLATELET 497 06/61/3983 07:25 AM    .6 (H) 06/27/2022 07:25 AM     Lab Results   Component Value Date/Time    Sodium 143 06/27/2022 07:25 AM    Potassium 5.0 06/27/2022 07:25 AM    Chloride 107 06/27/2022 07:25 AM    CO2 27 06/27/2022 07:25 AM    Anion gap 9 06/27/2022 07:25 AM    Glucose 84 06/27/2022 07:25 AM    BUN 38 (H) 06/27/2022 07:25 AM    Creatinine 8.18 (H) 06/27/2022 07:25 AM    BUN/Creatinine ratio 5 (L) 06/27/2022 07:25 AM    GFR est AA 6 (L) 06/27/2022 07:25 AM    GFR est non-AA 5 (L) 06/27/2022 07:25 AM    Calcium 9.0 06/27/2022 07:25 AM          DIET:  DIET ADULT     PRIMARY NURSE REPORT:   Pre Dialysis: Venice Montiel RN    Time: 2000     EDUCATION:    [x] Patient           Knowledge Basis: []None []Minimal [x] Substantial [] Unknown  Barriers to learning  [x]None  [] Intubated/Trached/Ventilated  [] Sedated/Paralyzed   [x] Access Care     [] S&S of infection  [] Fluid Management  [] K+   [x] Procedural    [x] Medications   [] Tx Options   [] Transplant   [] Diet      Teaching Tools:  [x] Explain  [] Demo  [] Handouts [] Video  Patient response: [] Verbalized understanding   [] Requires follow up        [x] Time Out/Safety Check    [x] Extracorporeal Circuit Tested for integrity       RO/HEMODIALYSIS MACHINE SAFETY CHECKS - Before each treatment:        [address] [place] [address]                                     [] Unit Machine #  with centralized RO                                  [] Portable Machine #1/RO serial # [id number]                                  [x] Portable Machine #2/RO serial # [id number]                                  [] Portable Machine #4/RO serial # [id number]                                  [] Portable Machine #10/RO serial # [id number] Alarm Test:  Pass time 2015           [] RO/Machine Log Complete    Machine Temp    36-37*C             Dialysate: pH  7.4    Conductivity: Meter 14.0    HD Machine  14.0     TCD: 13.8  Dialyzer Lot # [id number]     Blood Tubing Lot # [id number]     Saline Lot # [id number]     CHLORINE TESTING-Before each treatment and every 4 hours    Total Chlorine: [x] less than 0.1 ppm  Initial Time Check: 2015      4 Hr/2nd Check Time: 0000   (if greater than 0.1 ppm from Primary then every 30 minutes from Secondary)     TREATMENT INITIATION - with Dialysis Precautions:   [x] All Connections Secured              [x] Saline Line Double Clamped   [x] Venous Parameters Set               [x] Arterial Parameters Set    [x] Prime Given 250ml NSS              [x]Air Foam Detector Engaged        Treatment Initiation Note:  Arrived to patient's bedside at 2005. Patient is A&Ox4. Patient was asked about allergies to Albumin. She stated that is gives her a \"headache and diarrhea\". Patient left AVF UE was accessed without difficulty. However due to major weight loss (75lbs in 6 months) it is positional and requires patient's arm to be propped with pillows to maintain adequate venous pressure. Treatment initiated at 2032    During Treatment Notes:  2045  Face & Vascular access visible with art and nichelle line connections intact. Pt tolerating dialysis. 2100  Face & Vascular access visible with art and nichelle line connections intact. Pt tolerating dialysis. 2115  Face & Vascular access visible with art and nichelle line connections intact. Pt tolerating dialysis. 2130  Face & Vascular access visible with art and nichelle line connections intact. Pt tolerating dialysis. 2145  Face & Vascular access visible with art and nichelle line connections intact. Pt tolerating dialysis. 2200  Face & Vascular access visible with art and nichelle line connections intact. Pt tolerating dialysis.   2215  Face & Vascular access visible with art and nichelle line connections intact. Pt tolerating dialysis. 2230  Face & Vascular access visible with art and nichelle line connections intact. Pt tolerating dialysis. 2245  Face & Vascular access visible with art and nichelle line connections intact. Pt tolerating dialysis. 2300  Face & Vascular access visible with art and nichelle line connections intact. Pt tolerating dialysis. 2315  Face & Vascular access visible with art and nichelle line connections intact. Pt tolerating dialysis. 2330  Face & Vascular access visible with art and nichelle line connections intact. Pt tolerating dialysis. 2332  Dialysis treatment complete. Medication    Dose    Volume Route      Time       Dat Nurse      HD  Mariana Rodriguez RN      HD         HD        Post Assessment  Dialyzer Cleared:   [] Good  [x] Fair  [] Poor  Blood processed:  58.7 L  UF Removed:  1000 Ml    Post BP: 91/63  Pulse: 76  Respirations: 17   Temp: 97.6  [] Oral  [] Ax  [] Esophageal   Lungs: [] Clear                [x] No change from initial assessment   Post Tx Vascular Access: [] N/A  AVF/AVG: Bleeding stopped with  Arterial Pressure for 5 min   Venous Pressure for 5 min      Cardiac:  [] Regular   [x] Irregular   Rhythm:  [x] Monitored   [] Not Monitored    CVC Catheter: [x] N/A  Locking solution: Heparin 1:1000 U  Arterial port 0 ml   Venous port 0 ml   Edema:  [x] None  [] Generalized                     Skin:[x] Warm  [x] Dry [] Diaphoretic               [] Flushed  [] Pale [] Cyanotic Pain:  [x]0  []1-2  []3-4  []5-6   []7-8  []9-10         Post Treatment Note:   Dr. Francisco J Mota was called in reference to patient's low BP 87/41 at 2053. Dr. Francisco J Mota advised for UF goal to be decreased. Goal decreased to 1000mL at 2056. Patient tolerated treatment well. No issues when removing needles.  Bedside report given to primary nurse     POST TREATMENT PRIMARY NURSE HANDOFF REPORT:   Post Dialysis: Srini Flores        Time:  2330       Abbreviations: AVG-arterial venous graft, AVF-arterial venous fistula, IJ-Internal Jugular, Subcl-Subclavian, Fem-Femoral, Tx-treatment, AP/HR-apical heart rate, VSS- Vital Signs Stable, CVC- Central Venous Catheter, DFR-dialysate flow rate, BFR-blood flow rate, AP-arterial pressure, -venous pressure, UF-ultrafiltrate, TMP-transmembrane pressure, Jasper-Venous, Art-Arterial, RO-Reverse Osmosis

## 2022-06-28 NOTE — PROGRESS NOTES
Received additional medication for patient to discharge home with. The COPAY for this medication is $65.72, making her total copay for both presciptions $66.42.

## 2022-06-28 NOTE — PROGRESS NOTES
D/C order noted for today. Orders reviewed. No needs identified at this time.            CHARITY EnriquezN RN  Care Management  Pager: [phone]

## 2022-06-28 NOTE — PROGRESS NOTES
conducted an initial consultation and Spiritual Assessment for Stevie Hubbard, who is a 64 y.o.,female. Patients Primary Language is: [place]. According to the patients EMR Evangelical Affiliation is: Episcopalian.      The reason the Patient came to the hospital is:   Patient Active Problem List    Diagnosis Date Noted    ESRD on dialysis ([address] 05/25/2022    A-fib ([address] 06/27/2022    Presence of Watchman left atrial appendage closure device 06/27/2022    SOB (shortness of breath) on exertion 05/25/2022    Stricture of female urethra 04/12/2022    Gross hematuria 12/02/2021    Hyperkalemia 11/29/2021    Mononeuropathy     Hyperlipidemia     Hypothyroidism     History of kidney stones     Chronic pain     Lung mass     History of urethral stricture     Uric acid nephrolithiasis     Urinary incontinence     Glaucoma     Depression     Multiple closed pelvic fractures without disruption of pelvic ring ([address] 11/19/2021    Impaired mobility and ADLs 11/19/2021    Hyperphosphatemia 11/14/2021    Closed fracture of left inferior pubic ramus, with routine healing, subsequent encounter 11/12/2021    Closed nondisplaced fracture of anterior wall of left acetabulum with routine healing 11/12/2021    Closed fracture of superior pubic ramus, left, with routine healing, subsequent encounter 11/12/2021    Anticoagulated by anticoagulation treatment     Paroxysmal atrial fibrillation (HCC)     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    History of septic shock 10/08/2021    History of urinary tract infection 10/08/2021    Need for prophylactic isolation 10/08/2021    History of hydronephrosis 10/05/2021    End-stage renal disease on hemodialysis ([address]     History of sepsis 06/18/2021    History of recurrent urinary tract infection     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 Provided information about Spiritual Care Services. Chart reviewed. The following outcomes where achieved:  Patient already has a scanned Advance Medical Directive in the chart. See Adv Care Plan. Patient expressed gratitude for 's visit. Assessment:  Patient does not have any Oriental orthodox/cultural needs that will affect patients preferences in health care. Plan:  Chaplains will continue to follow and will provide pastoral care on an as needed/requested basis.  recommends bedside caregivers page  on duty if patient shows signs of acute spiritual or emotional distress.     [address]  [phone])

## 2022-06-28 NOTE — PROGRESS NOTES
Problem: Risk for Spread of Infection  Goal: Prevent transmission of infectious organism to others  Description: Prevent the transmission of infectious organisms to other patients, staff members, and visitors. Outcome: Resolved/Met     Problem: Patient Education:  Go to Education Activity  Goal: Patient/Family Education  Outcome: Resolved/Met     Problem: Pressure Injury - Risk of  Goal: *Prevention of pressure injury  Description: Document Giovany Scale and appropriate interventions in the flowsheet. Outcome: Resolved/Met  Note: Pressure Injury Interventions: Activity Interventions: Pressure redistribution bed/mattress(bed type),PT/OT evaluation,Increase time out of bed    Mobility Interventions: Pressure redistribution bed/mattress (bed type),PT/OT evaluation    Nutrition Interventions: Document food/fluid/supplement intake                     Problem: Patient Education: Go to Patient Education Activity  Goal: Patient/Family Education  Outcome: Resolved/Met     Problem: Falls - Risk of  Goal: *Absence of Falls  Description: Document Freddy Fall Risk and appropriate interventions in the flowsheet.   Outcome: Resolved/Met  Note: Fall Risk Interventions:            Medication Interventions: Bed/chair exit alarm,Patient to call before getting OOB,Evaluate medications/consider consulting pharmacy    Elimination Interventions: Call light in reach              Problem: Patient Education: Go to Patient Education Activity  Goal: Patient/Family Education  Outcome: Resolved/Met     Problem: Pain  Goal: *Control of Pain  Outcome: Resolved/Met     Problem: Patient Education: Go to Patient Education Activity  Goal: Patient/Family Education  Outcome: Resolved/Met     Problem: Impaired Skin Integrity/Pressure Injury Treatment  Goal: *Improvement of Existing Pressure Injury  Outcome: Resolved/Met  Goal: *Prevention of pressure injury  Description: Document Giovany Scale and appropriate interventions in the flowsheet. Outcome: Resolved/Met  Note: Pressure Injury Interventions:             Activity Interventions: Pressure redistribution bed/mattress(bed type),PT/OT evaluation,Increase time out of bed    Mobility Interventions: Pressure redistribution bed/mattress (bed type),PT/OT evaluation    Nutrition Interventions: Document food/fluid/supplement intake                     Problem: Patient Education: Go to Patient Education Activity  Goal: Patient/Family Education  Outcome: Resolved/Met

## 2022-06-28 NOTE — PROGRESS NOTES
Delivered prescription antibiotics. Informed nurse Nate Mcmillan in room at the time. Patient stated she did not have the funds for the Midodrine prescription at this time, she stated she does have some at home. Informed nurse Jerardo Javed.

## 2022-06-28 NOTE — PROGRESS NOTES
[facility] Medicine  Progress Note    Patient: Diane Brown MRN: [id number]   SSN: [id number]  YOB: 1945   Age: 64 y.o. Sex: female      Admit Date: 6/27/2022    LOS: 1 day   No chief complaint on file. Subjective:     No acute events overnight. Patient doing well with no new concerns. Chest pain resolved and seems to be mostly musculoskeletal in nature.         Objective:     Patient Vitals for the past 24 hrs:   Temp Pulse Resp BP SpO2   06/28/22 0818 -- -- -- (!) 80/44 --   06/28/22 0800 97.7 °F (36.5 °C) (!) 57 19 (!) 88/43 100 %   06/28/22 0700 -- (!) 57 15 (!) 80/44 100 %   06/28/22 0530 -- 60 17 (!) 89/47 97 %   06/28/22 0430 -- (!) 58 13 (!) 72/44 100 %   06/28/22 0400 -- (!) 57 16 -- 100 %   06/28/22 0300 -- 62 15 (!) (P) 88/62 99 %   06/28/22 0200 -- 60 17 -- 100 %   06/28/22 0130 -- 62 19 (!) 82/56 99 %   06/28/22 0100 -- 69 17 -- 98 %   06/28/22 0000 97.8 °F (36.6 °C) 76 21 (!) 101/47 100 %   06/27/22 2332 97.6 °F (36.4 °C) 76 17 91/63 100 %   06/27/22 2330 -- 70 15 (!) 108/54 100 %   06/27/22 2315 -- 67 16 (!) 127/97 100 %   06/27/22 2300 -- 73 15 (!) 114/50 98 %   06/27/22 2245 -- 72 17 101/76 99 %   06/27/22 2230 -- 66 14 (!) 94/52 100 %   06/27/22 2215 -- 65 16 (!) 95/56 100 %   06/27/22 2200 -- (!) 57 -- (!) 90/51 --   06/27/22 2145 -- (!) 54 15 (!) 97/50 100 %   06/27/22 2130 -- 61 17 (!) 101/53 100 %   06/27/22 2115 -- (!) 52 -- (!) 90/51 --   06/27/22 2100 -- (!) 50 16 (!) 87/41 100 %   06/27/22 2045 -- (!) 45 -- (!) 87/51 --   06/27/22 2032 -- -- -- (!) 98/42 --   06/27/22 2030 -- (!) 57 15 (!) 128/91 97 %   06/27/22 2015 97.7 °F (36.5 °C) (!) 54 16 (!) 106/56 92 %   06/27/22 2000 98.2 °F (36.8 °C) (!) 58 19 (!) 117/59 99 %   06/27/22 1900 -- (!) 53 15 (!) 92/54 99 %   06/27/22 1800 -- (!) 58 15 (!) 105/56 98 %   06/27/22 1700 -- (!) 46 17 (!) 90/58 95 %   06/27/22 1656 -- (!) 56 -- (!) 94/51 --   06/27/22 1600 97.9 °F (36.6 °C) (!) 50 17 (!) 94/51 98 %   06/27/22 1500 97.6 °F (36.4 °C) (!) 50 17 (!) 101/42 97 %   06/27/22 1415 -- (!) 54 12 (!) 95/44 92 %   06/27/22 1400 -- (!) 54 17 (!) 101/47 93 %   06/27/22 1345 -- (!) 56 14 (!) 105/55 99 %   06/27/22 1330 -- (!) 52 17 (!) 106/54 96 %   06/27/22 1315 -- (!) 58 16 (!) 109/51 99 %   06/27/22 1300 -- (!) 58 21 (!) 104/48 (!) 69 %   06/27/22 1245 -- (!) 58 14 (!) 104/49 99 %   06/27/22 1230 -- (!) 42 16 (!) 105/47 97 %   06/27/22 1215 -- (!) 46 15 (!) 91/43 97 %   06/27/22 1200 -- (!) 56 16 (!) 105/48 97 %   06/27/22 1145 -- (!) 49 16 -- 97 %   06/27/22 1130 -- (!) 47 16 (!) 99/44 97 %   06/27/22 1115 -- (!) 40 12 (!) 95/41 97 %   06/27/22 1100 -- (!) 51 14 (!) 96/44 99 %   06/27/22 1045 -- (!) 50 14 (!) 110/47 98 %   06/27/22 1030 -- (!) 46 15 (!) 114/47 98 %   06/27/22 1015 -- (!) 52 16 (!) 107/41 96 %   06/27/22 1000 -- 62 14 (!) 104/43 (!) 88 %   06/27/22 0959 -- 66 15 (!) 104/47 95 %        Physical Exam:     General:  AAOx3, NAD   HEENT: Conjunctiva pink, sclera anicteric. PERRL. EOMI. Pharynx moist, nonerythematous. Moist mucous membranes. CV: Sinus bradycardia, no murmurs. No visible pulsations or thrills. RESP:  Unlabored breathing. Lungs clear to auscultation without adventitious breath sounds. Equal expansion bilaterally. ABD:  Soft, nontender, nondistended. BS (+). MS:  No joint deformity or instability. No atrophy. Neuro:  CN II-XII grossly intact. 5/5 strength bilateral upper extremities and lower extremities. Ext:  No edema. 2+ radial and dp pulses bilaterally. Skin:  No rashes, lesions, or ulcers. Good turgor. Intake and Output:  Current Shift: No intake/output data recorded.   Last three shifts: 06/26 1901 - 06/28 0700  In: 500 [I.V.:500]  Out: -     Lab/Data Review:  Recent Results (from the past 24 hour(s))   POC ACTIVATED CLOTTING TIME    Collection Time: 06/27/22  9:30 AM   Result Value Ref Range    Activated Clotting Time (POC) 289 (H) 79 - 138 SECS   EKG, 12 LEAD, INITIAL Collection Time: 06/27/22  1:46 PM   Result Value Ref Range    Ventricular Rate 57 BPM    QRS Duration 104 ms    Q-T Interval 476 ms    QTC Calculation (Bezet) 463 ms    Calculated R Axis -152 degrees    Calculated T Axis 145 degrees    Diagnosis       Sinus bradycardia  Right superior axis deviation  Abnormal ECG  When compared with ECG of 27-JUN-2022 07:19,  Sinus bradycardia has replaced Atrial fibrillation  Confirmed by Antonia Roblero MD, ----- ([id number]) on 6/27/2022 4:33:17 PM     CBC WITH AUTOMATED DIFF    Collection Time: 06/28/22  5:10 AM   Result Value Ref Range    WBC 7.2 4.6 - 13.2 K/uL    RBC 2.80 (L) 4.20 - 5.30 M/uL    HGB 8.9 (L) 12.0 - 16.0 g/dL    HCT 29.4 (L) 35.0 - 45.0 %    .0 (H) 78.0 - 100.0 FL    MCH 31.8 24.0 - 34.0 PG    MCHC 30.3 (L) 31.0 - 37.0 g/dL    RDW 17.0 (H) 11.6 - 14.5 %    PLATELET 396 538 - 749 K/uL    MPV 10.0 9.2 - 11.8 FL    NRBC 0.0 0  WBC    ABSOLUTE NRBC 0.00 0.00 - 0.01 K/uL    NEUTROPHILS 68 40 - 73 %    LYMPHOCYTES 21 21 - 52 %    MONOCYTES 9 3 - 10 %    EOSINOPHILS 0 0 - 5 %    BASOPHILS 1 0 - 2 %    IMMATURE GRANULOCYTES 2 (H) 0.0 - 0.5 %    ABS. NEUTROPHILS 4.9 1.8 - 8.0 K/UL    ABS. LYMPHOCYTES 1.5 0.9 - 3.6 K/UL    ABS. MONOCYTES 0.7 0.05 - 1.2 K/UL    ABS. EOSINOPHILS 0.0 0.0 - 0.4 K/UL    ABS. BASOPHILS 0.1 0.0 - 0.1 K/UL    ABS. IMM.  GRANS. 0.1 (H) 0.00 - 0.04 K/UL    DF AUTOMATED     METABOLIC PANEL, BASIC    Collection Time: 06/28/22  5:10 AM   Result Value Ref Range    Sodium 139 136 - 145 mmol/L    Potassium 4.7 3.5 - 5.5 mmol/L    Chloride 104 100 - 111 mmol/L    CO2 26 21 - 32 mmol/L    Anion gap 9 3.0 - 18 mmol/L    Glucose 101 (H) 74 - 99 mg/dL    BUN 24 (H) 7.0 - 18 MG/DL    Creatinine 5.27 (H) 0.6 - 1.3 MG/DL    BUN/Creatinine ratio 5 (L) 12 - 20      GFR est AA 10 (L) >60 ml/min/1.73m2    GFR est non-AA 8 (L) >60 ml/min/1.73m2    Calcium 7.9 (L) 8.5 - 10.1 MG/DL       Assessment and Plan:   64 y.o. female with PMH paroxysmal A. fib, ESRD on hemodialysis Monday Wednesday Friday, HLD, hypothyroidism, chronic hypotension, DM2, depression, glaucoma, recurrent UTIs, gout, low back pain, now presenting status post watchman procedure.     Status post watchman procedure  -Nephrology and cardiology consulted  - Labs per cardiology  -Limited TTE per cardiology  -If all labs/imaging okay, can consider discharge today pending cardiology     Paroxysmal afib  -Home meds: amiodarone 200mg , Eliquis 5mg bid  -Restarting medications per cardiology orders.        ESRD on HD (MWF)  -Nephrology consulted, dialysis as per schedule/need     Chronic hypotension  - Continue Midodrine 10mg tid.     Hypothyroidism  -cont home levothyroxine 125mcg      DM II with neuropathy  -Gabapentin 100 ghs     Depression  Continue Duloxetine 20mg.     Gout  Continue Allopurinol 100mg MWF.      Global Care:  - VS per unit routine  - supplemental O2 for sats < 92%  - incentive spirometer  - PT/OT/CM      Diet  DIET ADULT    DVT Prophylaxis  Eliquis   GI Prophylaxis  Protonix   Code status Full Code    Disposition 1-2 nights     Damian Ackerman MD, PGY-2   [facility] Medicine   June 28, 2022, 8:24 AM

## 2022-06-28 NOTE — PROGRESS NOTES
Received 1 prescription for patient in [facility]. Patient prescription COPAY $0.70.

## 2022-06-28 NOTE — DISCHARGE INSTRUCTIONS
Patient Education        Learning About Atrial Fibrillation  What is atrial fibrillation? Atrial fibrillation (say \"AY-tree-tamie owp-ctcu-NBV-philun\") is a common type of irregular heartbeat (arrhythmia). Normally, the heart beats in a strong, steady rhythm. In atrial fibrillation, a problem with the heart's electrical system causes the two upper chambers of the heart (called the atria) to quiver, or fibrillate. Atrial fibrillation can be dangerous. This is because if the heartbeat isn't strong and steady, blood can collect, or pool, in the atria. And pooled blood is more likely to form clots. Clots can travel to the brain, block blood flow, and cause a stroke. Atrial fibrillation can also lead to heart failure. This condition also upsets the normal rhythm between the atria and the lower chambers of the heart. (These chambers are called the ventricles.) The ventricles may beat fast and without a regular rhythm. What are the symptoms? Some people feel symptoms when they have episodes of atrial fibrillation. But other people don't notice any symptoms. If you have symptoms, you may feel:  · A fluttering, racing, or pounding feeling in your chest called palpitations. · Weak or tired. · Dizzy or lightheaded. · Short of breath. · Chest pain. · Confused. You may notice signs of atrial fibrillation when you check your pulse. Your pulse may seem uneven or fast.  What can you expect when you have atrial fibrillation? At first, spells of atrial fibrillation may come on suddenly and last a short time. They may go away on their own or with treatment. Over time, the spells may last longer and occur more often. They often don't go away on their own. How is it treated? Treatments can help you feel better and prevent future problems, especially stroke and heart failure. Your treatment will depend on the cause of your atrial fibrillation, your symptoms, and your risk for stroke.  Types of treatment include:  · Heart rate treatment. Medicine may be used to slow your heart rate. Your heartbeat may still be irregular. But these medicines keep your heart from beating too fast. They may also help relieve symptoms. · Heart rhythm treatment. Different treatments may be used to try to stop atrial fibrillation and keep it from returning. They can also relieve symptoms. These treatments include medicine, electrical cardioversion to shock the heart back to a normal rhythm, a procedure called catheter ablation, and heart surgery. · Stroke prevention. You and your doctor can decide how to lower your risk. You may decide to take a blood-thinning medicine called an anticoagulant. What is a heart-healthy lifestyle for atrial fibrillation? You can live well and help manage atrial fibrillation by having a heart-healthy lifestyle. This lifestyle may help reduce how often you have episodes of atrial fibrillation. Don't smoke. Avoid secondhand smoke too. Quitting smoking is the best thing you can do for your heart. Be active. Talk to your doctor about what type and level of exercise is safe for you. Eat a heart-healthy diet. These foods include vegetables, fruits, nuts, beans, lean meat, fish, and whole grains. Limit sodium, alcohol, and sugar. Avoid alcohol if it triggers symptoms. Stay at a healthy weight. Lose weight if you need to. Losing weight can help relieve symptoms. Manage other health problems. These problems include diabetes, high blood pressure, and high cholesterol. If you think you may have a problem with alcohol or drug use, talk to your doctor. Manage stress. Options like yoga, biofeedback, and meditation may help. Where can you learn more? Go to http://www.gray.com/  Enter L274 in the search box to learn more about \"Learning About Atrial Fibrillation. \"  Current as of: January 10, 2022               Content Version: 13.2  © 4090-7340 [employer].    Care instructions adapted under license by [employer] (which disclaims liability or warranty for this information). If you have questions about a medical condition or this instruction, always ask your healthcare professional. [place] [address] any warranty or liability for your use of this information. [facility]  Cardiovascular & Thoracic Surgery  Structural Heart Program  [facility] [address]  [address]   (G) [phone]  (I) [phone]     [address] [place] [address] Post-Procedure Hospital Discharge Instructions     Provider: Lacy Jackson MD         Activity:  ? Avoid driving, operating machinery, alcohol consumption, signing legal documents or participating in legal proceedings for 24 hours after receiving sedation. ? Hospital Discharge: A responsible adult must drive you home from the hospital.  ? You may resume normal activities, including light walking, after 24 hours. ? Avoid lifting more than 10 pounds, jogging, exercise classes, sports and vigorous activities at least 7 days. ? Avoid sitting more than one consecutive hour for 3 days. If traveling, stop and walk for 5 minutes every hour. Medications:  ? Please continue to take your blood thinner as directed: Eliquis 5mg PO, one tablet in the morning and one tablet in the evening  o If you are experiencing any bleeding issues, please call your provider's office. Follow-up schedule after your Watchman procedure: Follow Up Appointment: 7/5/22 at 1:00 PM with Dr. Atiya Sanz at [facility] in Dr. Rachelle Burroughs office. Check in at the heart center entrance at 12:30 PM.   CATINA: 8/15/22 at 09:15 AM at [facility], check in at the [address] [place] [address] at 08:00 AM, nothing to eat or drink after midnight the night before the procedure.     Follow Up Appointment: 8/24/22 at 02:00 PM with Aiden Kelly MD, [facility], [address] [phone].     o Remember, you cannot have anything to eat or drink after midnight the night before your CATINA.  o The doctor will give you directions about your blood thinning medication after the CATINA. Please continue medications unless specifically directed otherwise. If you are experiencing any bleeding issues, please call your provider's office. o Day after CATINA: The nurse will call you to review the directions you received after your CATINA. ? Around 6 months: Our Nurse Practitioner will call to discuss your blood thinning medications and any recommended changes to this. Dentist Visits: The first 6 months after the procedure you will need antibiotics before you have dental work.  o Take Amoxicillin 2000mg as directed 30mins to one hour prior to scheduled dental work. Please call your provider's office with any question or concerns. Potential Problems:   Please call us if you develop  o A fever of 100 or higher during your first few days at home.  o Increased swelling of a groin bruise. A groin bruise is to be expected due to the blood thinners used during your procedure. Discoloration will spread as you move about but swelling should decrease. o Persistent throat and groin soreness/discomfort that does NOT resolve within a few days. o Shortness of breath. Madeline Lever o A new persistent cough or unexplained shortness of breath.   o Decreased urination and weight gain. For questions and concerns during daytime hours, please call structural heart office at [phone]. For questions/concerns at night or weekends, you can reach us at [phone]  For any emergencies, please seek medical attention by either calling 911 or going to nearest ED. Follow up with [facility] within 5 days. You will be receiving a phone call from the [facility] office in about a day or two. Follow up with Cardiology in one week for a groin check  Remember to take your amoxicillin antibiotic which is for preventing infection after your procedure.   You will be having an echocardiogram through the throat tube in 45 days. Continue your Eliquis. Your cardiologist will discuss changes at your follow up appointments. Your midodrine has been prescribed at a dose of 5 mg three times daily.

## 2022-06-28 NOTE — PROGRESS NOTES
Reason for Readmission:    Paroxysmal atrial fibrillation ([address] [I48.0]  Presence of Watchman left atrial appendage closure device [Z95.818]  A-fib (HCC) [I48.91]         RUR Score and Risk Level:      27     Level of Readmission:    3   (1-3)   (1 - First Readmission, 2- Second Readmission within 30 days or multiple admissions over previous 90 days, 3- Greater than two readmissions within 30 days or multiple admissions over previous 90 days)      Care Conference scheduled:   (consider for all patient's with readmission level of 2, should definitely be scheduled for all patients with readmission level of 3)       Resources/supports as identified by patient/family:         Top Challenges facing patient (as identified by patient/family and CM): Finances/Medication cost?     Pt has insurance  Transportation      Family, public transport  Support system or lack thereof?   family  Living arrangements? Lives with her son and daughter in-law   Self-care/ADLs/Cognition? Independent, AOx4        Current Advanced Directive/Advance Care Plan:        Healthcare Decision Maker:   Primary Decision Maker: Brinda Felder - [phone]    Secondary Decision Maker: Tad Blank Daughter-in-Law - [phone]    Click here to complete [address] including selection of the Healthcare Decision Maker Relationship (ie \"Primary\")           Plan for utilizing home health:   no.             Likelihood of additional readmission:                Transition of Care Plan:    Based on readmission, the patient's previous Plan of Care   has been evaluated and/or modified. The current Transition of Care Plan is:            Initial assessment completed with patient. Cognitive status of patient: oriented to time, place, person and situation. Face sheet information confirmed:  yes.   The patient designates her son Analia Hoang and daughter in-law Haritha Raphael to participate in her discharge plan and to receive any needed information. This patient lives in a single family home with son and daughter in-law. Patient is not able to navigate steps as needed. Per pt, she has a stair  lift  Prior to hospitalization, patient was considered to be independent with ADLs/IADLS : yes . Per pt, her son cooks sometimes because he loves to cook    Patient has a current ACP document on file: yes  The daughter will be available to transport patient home upon discharge. The patient already has [address], [address], Walker, Rollator, W/C, Stair lift, Shower chair, Grab bars,  medical equipment available in the home. Patient is not currently active with home health. Patient has stayed in a skilled nursing facility or rehab. Was  stay within last 60 days : no. This patient is on dialysis :yes    If yes, hemodialysis patient and receives treatment on Monday/Wednesday/Friday at [facility] [phone]  Chair time is 8:30am. Pt is transported to/from dialysis by [facility]. Currently, the discharge plan is Home. The patient states that she can obtain her medications from the pharmacy, and take her medications as directed. Patient's current insurance is [employer]. Care Management Interventions  PCP Verified by CM: Yes  Palliative Care Criteria Met (RRAT>21 & CHF Dx)?: No  Mode of Transport at Discharge:  Other (see comment) (family)  Transition of Care Consult (CM Consult): Discharge Planning  Support Systems: Child(isaura),Other Family Member(s)  Confirm Follow Up Transport: Family  Discharge Location  Patient Expects to be Discharged to[de-identified] Home with family assistance      Readmission Assessment  Number of days since last admission?: 8-30 days  Previous disposition: Home with Home Health  Who is being interviewed?: Patient  What was the patient's/caregiver's perception as to why they think they needed to return back to the hospital?: Other (Comment) (per pt, she came in for a scheduled procedure)  Did you visit your Primary Care Physician after you left the hospital, before you returned this time?: Yes  Did you see a specialist, such as Cardiac, Pulmonary, Orthopedic Physician, etc. after you left the hospital?: Yes  Who advised the patient to return to the hospital?: Physician  Does the patient report anything that got in the way of taking their medications?: No  In our efforts to provide the best possible care to you and others like you, can you think of anything that we could have done to help you after you left the hospital the first time, so that you might not have needed to return so soon?: Teaching during hospitalization regarding your illness      ADRIAN Segovia RN  Care Management  Pager: [phone]

## 2022-06-28 NOTE — PROGRESS NOTES
0700-received bedside report from off going nurse Shelli Pederson RN. Pt in bed resting quietly with no complaints at this time. 1000-placed pt on room air. Pt oxygen saturation 96%. 1120-pt ambulated in room. Pt tolerated well. 1515-I have reviewed discharge instructions with the patient. The patient verbalized understanding.  Patient armband removed and shredded

## 2022-06-28 NOTE — PROCEDURES
[facility]  Cardiovascular & Thoracic Surgery  Structural Heart Program  [facility] [address]  [address]   V) [phone]  (R) [phone]     Watchman Procedure Note     PATIENT NAME:  Riky Blackburn  DATE: 1945  MEDICAL RECORD #: [id number]    TYPE OF PROCEDURE:  1. Right CFV access  2. Trans-septal puncture  3. Left atrial pressure assessment  4. CATINA interpretation  5. Contrast cinefluoroscopy of the left atrial appendage. 6. Insertion of a left atrial appendage occlusion device. INDICATION: Non valvular atrial fibrillation with contraindication for long term full anticoagulation  REFERRING PHYSICIAN: Dr. Mary Kelly  : Alexa Villela MD, [facility]  IMPLANTED DEVICE: [facility] Scientific Model Watchman FLX 31mm     ANESTHESIA: General.    Pre-Procedure Timeout Verification:  Immediately prior to the procedure a time-out was performed to verify the correct patient, procedure and site. Procedure Description:   After informed consent where the procedure and its risks and benefits were discussed with the patient both during outpatient evaluation and again immediately prior to the procedure, the patient was brought to the EP lab in the fasting, nonsedated state. A shared decision form was completed by an independent non-imnterventional cardiologist who is not part of the procedure and is attached in the EMR/Media tab. General anesthesia was induced and maintained by an Anesthesiology Service team. An attending anesthesiologist remained in attendance throughout the case. Please see associated anesthesia report. Patient was lg-gastrically intubated with CATINA probe and images obtained with measurements made. Given acceptable measurements and once left atrial appendage thrombus was ruled out, patient was prepped and draped in usual and standard fashion. Local anesthesia was delivered.  Access of the right femoral vein was achieved using the modified Seldinger under ultrasound guidance using micropunture technique, and a 8F sheath was inserted. The patient was heparinized to maintain an ACT greater than 300 seconds. 2g Ancef was administereed at the onset of the procedure. A 8 F Hornsby sheath was inserted over the [employer] wire into the superior vena cava under fluoroscopic guidance. The Pigtail portion of VersaCross wire was withdrawn back under fluoroscopy to just before the end of the [facility] dilator. The entire apparatus was then slowly withdrawn keeping the indicator at 6 oclock. The tip jumped past the limbus. It was then slowly advanced and echocardiography documented tenting of the fossa ovalis. The position was confirmed in multiple fluoroscopic views. The VersaCross wire was then exposed in a mid to inferior and mid to posterior position of fossa ovalis and RF was turned on to gain successful access to the left atrium. The VersaCross wire was advanced into the LA and the Hornsby sheath and dilator were then advanced into the left atrium, and subsequently removed leaving VersaCross wire into the left atrium. The Watchman access sheath (WAS) was brought in over the VersaCross wire into the left atrium along with a 5 F pigtail catheter, which was then advanced to the left atrium appendage under fluoroscopy and VersaCross wire was taken out. Left atrial pressure was recorded. Contrast cinefluoroscopy of the left atrial appendage was performed. The pigtail was removed. A 31 mm Watchman FLX  device was then advanced to the distal marker under fluoroscopy. The sheath was then withdrawn to form a ball. The ball was postiioned, and the device was deployed. CATINA and Angiography confirmed excellent positioning, and a \"tug test\" confirmed anchoring.  There was 19% to 22% compression of the device as assessed in multiple CATINA views indicative of adequate sizing, and there was no leak seen with color-flow Doppler imaging indicating of adequate seal. After PASS criteria confirmed, the device was released and remained stable. Final images showed excellent position and no evidence of pericardial effusion. WAS sheath was removed from the RFV with application of a figure-of-eight stitch. Hemostasis was achieved. The patient was taken to the recovery area in stable condition. Home full anticoagulation with Eliquis was held for 48hrs pre-procedure. There were no procedural complications. Estimated blood loss was minimal    LA PRESSURE: 14mmHg    Recommendations   1. Apixaban 5 mg bid starting tonight if the right femoral access site remains stable. 2.  Will give an additional dose of antibiotics 8 hours post-procedure per protocol. 3.  Removal of figure-of-eight stitch in the AM.  4.  Check limited transthoracic echocardiogram and chest X-ray tomorrow. 6.  Follow-up with transesophageal echocardiogram in 6 weeks.     Kajal Topete MD, Andrea Bowling

## 2022-06-28 NOTE — PROGRESS NOTES
S/p elective LAAC with Watchman device on 6/27/22. Pt. Reports doing well overnight with no complaints. Figure eight stitch removed today without complication. Right groin site without bleeding or hematoma and Eliquis restarted yesterday evening without signs of bleeding. BLE extremities warm and pink, pulses palpable, without edema.

## 2022-06-30 ENCOUNTER — HOME CARE VISIT (OUTPATIENT)
Dept: SCHEDULING | Facility: HOME HEALTH | Age: 77
End: 2022-06-30
Payer: MEDICARE

## 2022-06-30 PROCEDURE — G0151 HHCP-SERV OF PT,EA 15 MIN: HCPCS

## 2022-06-30 NOTE — Clinical Note
Dear Dr. Romo Police,     This message is to inform you that your patient, Zee Del Valle,  1945, has been DC from [facility] services under Home PT today. Here is DC Summary of pt's status at GA. SUBJECTIVE: Pt reports she underwent her cardiac procedure earlier this week and everything went well. States she was not prescibed any new medications following the procedure and no changes were made to her current medications. She states she feels like it has helped and she hopes she will continue to feel improvements in her overall health. Pt denies any falls. CAREGIVER INVOLVEMENT/ASSISTANCE NEEDED FOR: Pts son and DIL assist with meals and transportation to appointments  [address] ORDERED/DELIVERED THIS VISIT INCLUDE: none  OBJECTIVE:  See interventions. MEDICATIONS: Medications reconciled and all medications are available in the home this visit. The following education was provided regarding medications, medication interactions, and look a like medications: Continue medication as prescribed by MD. Medications are effective at this time. PATIENT RESPONSE TO TREATMENT:  Pt required infrequent and brief rests throughout assessment to recover. Pts viatls remained stable including SpO2 remaining aroun 98% on room air. PATIENT LEVEL OF UNDERSTANDING OF EDUCATION PROVIDED: Pt has demonstrated understanding of LE strengthening HEP and importance of compliance for conitued functional improvements and ADL tolerance. Pt has verbalized understanding of discharge from agency as goals have been met. ASSESSMENT OF PROGRESS TOWARD GOALS: All goals have been met, no further skill is needed at this time  THE FOLLOWING DISCHARGE PLANNING WAS DISCUSSED WITH THE PATIENT/CAREGIVER: DC from home care agency, all goals have been met. Any questions please contact me at [phone].     Sincerely,     Ambika Galicia PT

## 2022-07-01 LAB
ABO + RH BLD: NORMAL
BLD PROD TYP BPU: NORMAL
BLD PROD TYP BPU: NORMAL
BLOOD GROUP ANTIBODIES SERPL: NORMAL
BPU ID: NORMAL
BPU ID: NORMAL
CALLED TO:,BCALL1: NORMAL
CROSSMATCH RESULT,%XM: NORMAL
CROSSMATCH RESULT,%XM: NORMAL
SPECIMEN EXP DATE BLD: NORMAL
STATUS OF UNIT,%ST: NORMAL
STATUS OF UNIT,%ST: NORMAL
UNIT DIVISION, %UDIV: 0
UNIT DIVISION, %UDIV: 0

## 2022-07-02 VITALS
HEART RATE: 71 BPM | DIASTOLIC BLOOD PRESSURE: 68 MMHG | OXYGEN SATURATION: 71 % | SYSTOLIC BLOOD PRESSURE: 118 MMHG | RESPIRATION RATE: 17 BRPM | TEMPERATURE: 97.3 F

## 2022-07-02 NOTE — HOME HEALTH
SUBJECTIVE: Pt reports she underwent her cardiac procedure earlier this week and everything went well. States she was not prescibed any new medications following the procedure and no changes were made to her current medications. She states she feels like it has helped and she hopes she will continue to feel improvements in her overall health. Pt denies any falls. CAREGIVER INVOLVEMENT/ASSISTANCE NEEDED FOR: Pts son and DIL assist with meals and transportation to appointments  [address] ORDERED/DELIVERED THIS VISIT INCLUDE: none  OBJECTIVE:  See interventions. MEDICATIONS: Medications reconciled and all medications are available in the home this visit. The following education was provided regarding medications, medication interactions, and look a like medications: Continue medication as prescribed by MD. Medications are effective at this time. PATIENT RESPONSE TO TREATMENT:  Pt required infrequent and brief rests throughout assessment to recover. Pts viatls remained stable including SpO2 remaining aroun 98% on room air. PATIENT LEVEL OF UNDERSTANDING OF EDUCATION PROVIDED: Pt has demonstrated understanding of LE strengthening HEP and importance of compliance for conitued functional improvements and ADL tolerance. Pt has verbalized understanding of discharge from agency as goals have been met. ASSESSMENT OF PROGRESS TOWARD GOALS: All goals have been met, no further skill is needed at this time  THE FOLLOWING DISCHARGE PLANNING WAS DISCUSSED WITH THE PATIENT/CAREGIVER: DC from home care agency, all goals have been met.

## 2022-07-05 ENCOUNTER — OFFICE VISIT (OUTPATIENT)
Dept: CARDIOTHORACIC SURGERY | Age: 77
End: 2022-07-05
Payer: MEDICARE

## 2022-07-05 VITALS
OXYGEN SATURATION: 96 % | DIASTOLIC BLOOD PRESSURE: 51 MMHG | HEART RATE: 70 BPM | RESPIRATION RATE: 18 BRPM | BODY MASS INDEX: 37.91 KG/M2 | SYSTOLIC BLOOD PRESSURE: 90 MMHG | WEIGHT: 206 LBS | HEIGHT: 62 IN

## 2022-07-05 DIAGNOSIS — Z95.818 PRESENCE OF WATCHMAN LEFT ATRIAL APPENDAGE CLOSURE DEVICE: ICD-10-CM

## 2022-07-05 DIAGNOSIS — R07.9 CHEST PAIN, UNSPECIFIED TYPE: Primary | ICD-10-CM

## 2022-07-05 DIAGNOSIS — I48.0 PAROXYSMAL ATRIAL FIBRILLATION (HCC): ICD-10-CM

## 2022-07-05 PROCEDURE — 1111F DSCHRG MED/CURRENT MED MERGE: CPT | Performed by: STUDENT IN AN ORGANIZED HEALTH CARE EDUCATION/TRAINING PROGRAM

## 2022-07-05 PROCEDURE — G9717 DOC PT DX DEP/BP F/U NT REQ: HCPCS | Performed by: STUDENT IN AN ORGANIZED HEALTH CARE EDUCATION/TRAINING PROGRAM

## 2022-07-05 PROCEDURE — 1090F PRES/ABSN URINE INCON ASSESS: CPT | Performed by: STUDENT IN AN ORGANIZED HEALTH CARE EDUCATION/TRAINING PROGRAM

## 2022-07-05 PROCEDURE — G8536 NO DOC ELDER MAL SCRN: HCPCS | Performed by: STUDENT IN AN ORGANIZED HEALTH CARE EDUCATION/TRAINING PROGRAM

## 2022-07-05 PROCEDURE — G8399 PT W/DXA RESULTS DOCUMENT: HCPCS | Performed by: STUDENT IN AN ORGANIZED HEALTH CARE EDUCATION/TRAINING PROGRAM

## 2022-07-05 PROCEDURE — G8417 CALC BMI ABV UP PARAM F/U: HCPCS | Performed by: STUDENT IN AN ORGANIZED HEALTH CARE EDUCATION/TRAINING PROGRAM

## 2022-07-05 PROCEDURE — 99213 OFFICE O/P EST LOW 20 MIN: CPT | Performed by: STUDENT IN AN ORGANIZED HEALTH CARE EDUCATION/TRAINING PROGRAM

## 2022-07-05 PROCEDURE — 1101F PT FALLS ASSESS-DOCD LE1/YR: CPT | Performed by: STUDENT IN AN ORGANIZED HEALTH CARE EDUCATION/TRAINING PROGRAM

## 2022-07-05 PROCEDURE — G8427 DOCREV CUR MEDS BY ELIG CLIN: HCPCS | Performed by: STUDENT IN AN ORGANIZED HEALTH CARE EDUCATION/TRAINING PROGRAM

## 2022-07-05 PROCEDURE — 1123F ACP DISCUSS/DSCN MKR DOCD: CPT | Performed by: STUDENT IN AN ORGANIZED HEALTH CARE EDUCATION/TRAINING PROGRAM

## 2022-07-05 PROCEDURE — 93000 ELECTROCARDIOGRAM COMPLETE: CPT | Performed by: STUDENT IN AN ORGANIZED HEALTH CARE EDUCATION/TRAINING PROGRAM

## 2022-07-05 NOTE — LETTER
7/5/2022 12:42 PM    Patient:  Rafat Aguiar   YOB: 1945  Date of Visit: 7/5/2022      Hi York MD  [address] [facility] [address]  Via Fax: 081 [address]  [address] [facility]  Via [address] [facility] [address]    Dear MD Dieter Plasencia MD,      Thank you for referring Ms. Randee Stern to [facility] for evaluation and treatment. Below are the relevant portions of my assessment and plan of care. Thank you very much for your referral of Ms. Rafat Aguiar. If you have questions, please do not hesitate to call me. I look forward to following Ms. Stern along with you and will keep you updated as to her progress.            Sincerely,      Leslie Rubi MD

## 2022-07-05 NOTE — LETTER
7/5/2022    Patient: Meme España   YOB: 1945   Date of Visit: 7/5/2022     Marjan Sands MD  [facility] [address] [address]  Via Fax: [phone]    Dear Marjan Sands MD,      Thank you for referring Ms. Randee Stern to [facility] for evaluation. My notes for this consultation are attached. If you have questions, please do not hesitate to call me. I look forward to following your patient along with you.       Sincerely,    Timi Smith MD

## 2022-07-05 NOTE — PROGRESS NOTES
[facility]  Cardiovascular & Thoracic Surgery  Structural Heart Program  [address]  [id number] (C) [phone] (r) [phone]       Follow up Office Patient Visit  Date of Visit: 07/05/22   Referring Physician: Michael King MD  [address] [facility]  [address],  [address]    History of Present Illness: Brittany Perkins a 64 y. o. female, morbidly obese with past medical history of hypertension, hyperlipidemia, type 2 diabetes, end-stage renal disease on hemodialysis with chronic hypotension on midodrine during dialysis, hypothyroidism on Synthroid, anemia of chronic disease, paroxysmal atrial fibrillation on chronic Eliquis with prior hematurias/p recent LAAC with watchman device on 6/27/2022 for a postprocedure follow-up. Procedural standpoint, patient reports feeling well with no issues at the groin site. She denies any chest pain or shortness of breath. She does note that over the last couple of days she has started to have some diarrhea with some joint discomfort for which she is going to follow-up with her PCP this afternoon. She has been compliant with her Eliquis with no issues with bleeding so far. Denies any nausea, vomiting, abdominal pain, fever, chills, sputum production. No hematuria or other bleeding complaints    Review of Systems:  Cardiac symptoms as noted above in HPI. All others negative. Denies fatigue, malaise, skin rash, joint pain, blurring vision, photophobia, neck pain, hemoptysis, chronic cough, nausea, vomiting, hematuria, burning micturition, BRBPR, chronic headaches.     Past Medical History:  Past Medical History:   Diagnosis Date    Anemia in end-stage renal disease ([facility] [address]     Anticoagulated by anticoagulation treatment     On Apixaban    Chronic hypotension     On Midodrine    Chronic pain     Closed fracture of left inferior pubic ramus, with routine healing, subsequent encounter 11/12/2021    Closed fracture of superior pubic ramus, left, with routine healing, subsequent encounter 11/12/2021    Closed nondisplaced fracture of anterior wall of left acetabulum with routine healing 11/12/2021    Depression     End-stage renal disease on hemodialysis ([facility] [address]     HD at [facility] on MWF.  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/8/2021    Urine culture (collected 10/8/2021, resulted 10/14/2021) yielded growth of >100,000 colonies/ml of Enterococcus faecalis RESISTANT to Ciprofloxacin, Levofloxacin, Tetracycline and Vancomycin    History of kidney stones     History of recurrent urinary tract infection     History of sepsis 6/18/2021    History of septic shock 10/8/2021    History of urethral stricture     History of urinary tract infection 10/8/2021    Urine culture (collected 10/8/2021, resulted 10/14/2021) yielded growth of >100,000 colonies/ml of Enterococcus faecalis RESISTANT to Ciprofloxacin, Levofloxacin, Tetracycline and Vancomycin    Hyperlipidemia     Hyperphosphatemia 11/14/2021    Hypothyroidism     Lung mass     Mononeuropathy     Involving ring finger of left hand    Need for prophylactic isolation 10/8/2021    Urine culture (collected 10/8/2021, resulted 10/14/2021) yielded growth of >100,000 colonies/ml of Enterococcus faecalis RESISTANT to Ciprofloxacin, Levofloxacin, Tetracycline and Vancomycin    Paroxysmal atrial fibrillation (HCC)     Secondary hyperparathyroidism of renal origin ([facility] [address]     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 ([facility] [address]     HbA1c (10/8/2021) = 4.6    Uric acid nephrolithiasis     Urinary incontinence        Past Surgical History:  Past Surgical History:   Procedure Laterality Date    HX APPENDECTOMY      HX CHOLECYSTECTOMY      HX GASTRIC BYPASS Gastric stapling    HX KNEE ARTHROSCOPY      HX UROLOGICAL      right PCN placement    HX UROLOGICAL  07/23/2018    RIGHT URETEROSCOPY WITH HOLMIUM LASER    IR EXCHANGE NEPHRO PERC LT SI  2/21/2020    IR EXCHANGE NEPHRO PERC RT SI  4/13/2020    IR EXCHANGE NEPHRO PERC RT SI  7/17/2020    IR NEPHROSTOMY PERC RT PLC CATH  SI  10/14/2020    IR NEPHROURETERAL PERC RT PLC CATH NEW ACCESS  SI  4/30/2020    AR INTRO CATH DIALYSIS CIRCUIT DX ANGRPH FLUOR S&I Left 9/24/2020    FISTULOGRAM LEFT/poss permanent catheter placement performed by Shyanne Jeronimo MD at [facility] CATH LAB    VASCULAR SURGERY PROCEDURE UNLIST      lef AVF       Medications:  Current Outpatient Medications   Medication Sig    midodrine (PROAMATINE) 5 mg tablet Take 1 Tablet by mouth three (3) times daily (with meals) for 30 days.  gabapentin (NEURONTIN) 100 mg capsule Take 1 Capsule by mouth nightly. Max Daily Amount: 100 mg. Indications: concern for back pain post dialysis    melatonin 5 mg tablet Take 5 mg by mouth nightly.  HYDROmorphone (DILAUDID) 2 mg tablet Take 1 mg by mouth every eight (8) hours as needed for Pain. Take 1/2 tablet by mouth every 8 hours as needed for pain level of 5 out of 10 or greater    allopurinoL (ZYLOPRIM) 100 mg tablet Take 1 Tablet by mouth every Monday, Wednesday, Friday. [after hemodialysis]  Indications: treatment to prevent acute gout attack    amiodarone (CORDARONE) 200 mg tablet Take 1 Tablet by mouth daily. Indications: prevention of recurrent atrial fibrillation    sevelamer carbonate (RENVELA) 800 mg tab tab Take 2 Tablets by mouth three (3) times daily (with meals). Indications: renal osteodystrophy with hyperphosphatemia    acetaminophen (Tylenol Extra Strength) 500 mg tablet Take 1 Tablet by mouth every six (6) hours as needed for Pain.  apixaban (ELIQUIS) 5 mg tablet Take 1 Tablet by mouth two (2) times a day.     meclizine (ANTIVERT) 25 mg tablet Take 25 mg by mouth three (3) times daily as needed for Dizziness.  DULoxetine (Cymbalta) 20 mg capsule Take 20 mg by mouth daily.  estradioL (Estrace) 0.01 % (0.1 mg/gram) vaginal cream Apply a fingertip amount around the urethra three times a week.  biotin 1,000 mcg chew Take 1 Tab by mouth daily.  cyanocobalamin 1,000 mcg tablet Take 1,000 mcg by mouth daily.  lactobacillus sp. 50 billion cpu (BIO-K PLUS) 50 billion cell -375 mg cap capsule Take 1 Cap by mouth daily.  ascorbic acid, vitamin C, (VITAMIN C) 500 mg tablet Take 500 mg by mouth daily.  cholecalciferol (VITAMIN D3) (2,000 UNITS /50 MCG) cap capsule Take 2,000 Units by mouth two (2) times a day.  latanoprost (XALATAN) 0.005 % ophthalmic solution Administer 1 Drop to both eyes nightly. One drop at bedtime    levothyroxine (SYNTHROID) 125 mcg tablet Take 125 mcg by mouth Daily (before breakfast).  omeprazole (PRILOSEC) 20 mg capsule Take 20 mg by mouth daily.  ondansetron (ZOFRAN ODT) 4 mg disintegrating tablet Take 4 mg by mouth every eight (8) hours as needed for Nausea or Vomiting.  vit B Cmplx 3-FA-Vit C-Biotin (NEPHRO HOWIE RX) 1- mg-mg-mcg tablet Take 1 Tab by mouth daily.  lidocaine (LIDODERM) 5 % Apply patch to the affected area for 12 hours a day and remove for 12 hours a day. (Patient not taking: Reported on 7/5/2022)    methoxy peg-epoetin beta (MIRCERA INJECTION) 30 mcg by IntraVENous route. in HD (Patient not taking: Reported on 7/5/2022)     No current facility-administered medications for this visit.        Allergies and Sensitivities:  Allergies   Allergen Reactions    Albumin, Human 25 % Itching     Headache - severe migraine like, itchy eyes, runny nose    Ciprofloxacin Hives    Cyclopentolate Unknown (comments)    Iron Sucrose Diarrhea    Statins-Hmg-Coa Reductase Inhibitors Other (comments)     Body ache       Family History:  Family History   Problem Relation Age of Onset    Heart Surgery Sister        Social History:  Social History     Tobacco Use    Smoking status: Never Smoker    Smokeless tobacco: Never Used   Vaping Use    Vaping Use: Never used   Substance Use Topics    Alcohol use: Never    Drug use: Never     She  reports that she has never smoked. She has never used smokeless tobacco.  She  reports no history of alcohol use. Physical Exam:  BP Readings from Last 3 Encounters:   07/05/22 (!) 90/51   06/30/22 118/68   06/28/22 (!) 76/35         Pulse Readings from Last 3 Encounters:   07/05/22 70   06/30/22 71   06/28/22 (!) 55          Wt Readings from Last 3 Encounters:   07/05/22 93.4 kg (206 lb)   06/28/22 90.7 kg (200 lb)   06/08/22 96.2 kg (212 lb)       Constitutional: Oriented to person, place, and time. She is accompanied by her son. Uses a walker  HENT: Head: Normocephalic and atraumatic. Eyes: Conjunctivae and extraocular motions are normal.   Neck: No JVD present. Carotid bruit is not appreciated. Cardiovascular: Regular rhythm. No murmur, gallop or rubs appreciated  Lung: Breath sounds normal. No respiratory distress. No ronchi or rales appreciated  Abdominal: No tenderness. No rebound and no guarding. Musculoskeletal: There is no lower extremity edema. No cynosis  Lymphadenopathy:  No cervical or supraclavicular adenopathy appriciated. Neurological: No gross motor deficit noted. Skin: No visible skin rash noted. No Ear discharge noted  Psychiatric: Normal mood and affect.      LABS:     Lab Results   Component Value Date/Time    WBC 7.2 06/28/2022 05:10 AM    Hemoglobin, POC 8.8 (L) 09/24/2020 08:45 AM    HGB 8.9 (L) 06/28/2022 05:10 AM    Hematocrit, POC 26 (L) 09/24/2020 08:45 AM    HCT 29.4 (L) 06/28/2022 05:10 AM    PLATELET 970 69/21/4582 05:10 AM    .0 (H) 06/28/2022 05:10 AM     Lab Results   Component Value Date/Time    Sodium 139 06/28/2022 05:10 AM    Potassium 4.7 06/28/2022 05:10 AM    Chloride 104 06/28/2022 05:10 AM    CO2 26 06/28/2022 05:10 AM    Glucose 101 (H) 06/28/2022 05:10 AM    BUN 24 (H) 06/28/2022 05:10 AM    Creatinine 5.27 (H) 06/28/2022 05:10 AM     No flowsheet data found. Lab Results   Component Value Date/Time    ALT (SGPT) 20 05/27/2022 09:41 AM     Lab Results   Component Value Date/Time    Hemoglobin A1c 4.3 04/12/2022 12:46 PM     Lab Results   Component Value Date/Time    TSH 0.60 05/25/2022 09:12 AM       06/27/22    ECHO ADULT FOLLOW-UP OR LIMITED 06/28/2022 6/28/2022    Interpretation Summary    Left Ventricle: Normal left ventricular systolic function with a visually estimated EF of 55 - 60%. Normal wall motion.   Left Atrium: Device closure in the appendage with a Watchman.   Trivial pericardial effusion. Signed by: Jim Parada MD on 6/28/2022 10:38 AM        11/11/20    NUCLEAR CARDIAC STRESS TEST 11/11/2020 11/11/2020    Interpretation Summary  · Baseline ECG: Normal sinus rhythm. · Negative stress test.  · Gated SPECT: Left ventricular function post-stress was normal. Calculated ejection fraction is 67%. There is no evidence of transient ischemic dilation (TID). The TID ratio is 0.91.  · Myocardial perfusion imaging supports a low risk stress test.  · Left ventricular perfusion is normal.    Assessment: Jas Fish a 64 y. o. female, morbidly obese with past medical history of hypertension, hyperlipidemia, type 2 diabetes, end-stage renal disease on hemodialysis with chronic hypotension on midodrine during dialysis, hypothyroidism on Synthroid, anemia of chronic disease, paroxysmal atrial fibrillation on chronic Eliquis with prior hematurias/p recent LAAC with watchman device on 6/27/2022 for a postprocedure follow-up. IMPRESSION & PLAN:  -Patient is doing well postprocedure with no procedural issues.  -She is scheduled for a 45-day post watchman CATINA with Dr. Paty Pineda.   Once the CT is completed with no evidence of leak around the device more than 5 mm plan would be to discontinue Eliquis and start her on dual antiplatelet therapy with daily baby aspirin and Plavix 75 mg daily after a loading dose of 300 mg once. She will continue for 6 months before we would consider dropping her Plavix and continuing lifelong daily baby aspirin.  -She knows to follow-up with her outpatient cardiologist, Dr. Kiki Cabrera for any other issues.  -Also discussed endocarditis prophylaxis at length. This plan was discussed with patient who is in agreement. Shared decision making was utilized between the physician/provider and patient/family in regards to [facility]    Thank you for allowing me to participate in patient care. Please feel free to call me if you have any question or concern. Sammie Tierney MD, [address] [facility]    Please note: This document has been produced using voice recognition software. Unrecognized errors in transcription may be present.

## 2022-07-15 ENCOUNTER — TELEPHONE (OUTPATIENT)
Dept: CARDIOLOGY CLINIC | Age: 77
End: 2022-07-15

## 2022-07-15 DIAGNOSIS — I48.11 LONGSTANDING PERSISTENT ATRIAL FIBRILLATION (HCC): Primary | ICD-10-CM

## 2022-07-15 RX ORDER — SODIUM CHLORIDE 0.9 % (FLUSH) 0.9 %
5-40 SYRINGE (ML) INJECTION AS NEEDED
Status: CANCELLED | OUTPATIENT
Start: 2022-07-15

## 2022-07-15 RX ORDER — SODIUM CHLORIDE 0.9 % (FLUSH) 0.9 %
5-40 SYRINGE (ML) INJECTION EVERY 8 HOURS
Status: CANCELLED | OUTPATIENT
Start: 2022-07-15

## 2022-07-15 NOTE — TELEPHONE ENCOUNTER
----- Message from Yesika Hammer sent at 7/5/2022  1:08 PM EDT -----  Regarding: instructions  Patient is scheduled on 8/15 @9:15. Please call with instructions.  ----- Message -----  From: Deep Price  Sent: 7/5/2022  12:34 PM EDT  To: Ronny Alejo MD, Yesika Ware. Alejandro Soto, #    Dr Osman Jacob saw this patient today. Can we please call and confirm appointment for CATINA and follow up with Dr Rivera So as well as instructions for the CATINA post Watchman.   Thanks

## 2022-07-15 NOTE — TELEPHONE ENCOUNTER
[facility]          Patient  EP Instructions    Please get Lab Work completed about 7 days prior to procedure at [facility] or [facility]. 1. You are scheduled to have a CATINA on  August 15, 2022 , at 09:15. Please check in at 07:45 a.m.     2. Please go to [facility] and park in the outpatient parking lot that is located around to the back of the hospital and enter through the [facility]. Once you enter through the [facility] check in with the  there. The  will either give you directions or assist you in getting to the cath holding area. 3.  You are not to eat or drink anything after midnight the night before your procedure. 4. Please continue to take your medications with a small sip of water on the morning of the procedure with the following exceptions:  No exceptions     5. If you are diabetic, do not take your insulin/sugar pill the morning of the procedure. 6. We encourage families to wait in the waiting room on the first floor while the procedure is being done. The Doctor will come out and talk with you as soon as the procedure is over. 7. There is the possibility that you may spend the night in the hospital, depending on the results of the procedure. This will be determined after the procedure is done. 8.   If you or your family have any questions, please call our office Monday-Friday 9:00am         -4:30 pm , at [phone], and ask to speak to one of the nurses.

## 2022-08-05 RX ORDER — SODIUM CHLORIDE 0.9 % (FLUSH) 0.9 %
5-40 SYRINGE (ML) INJECTION AS NEEDED
Status: CANCELLED | OUTPATIENT
Start: 2022-08-05

## 2022-08-05 RX ORDER — SODIUM CHLORIDE 0.9 % (FLUSH) 0.9 %
5-40 SYRINGE (ML) INJECTION EVERY 8 HOURS
Status: CANCELLED | OUTPATIENT
Start: 2022-08-05

## 2022-08-05 NOTE — TELEPHONE ENCOUNTER
Called pt but had to leave a voicemail for them to call us back to review instructions, and we will try again next week as well.

## 2022-08-09 ENCOUNTER — HOSPITAL ENCOUNTER (OUTPATIENT)
Dept: PREADMISSION TESTING | Age: 77
Discharge: HOME | End: 2022-08-09
Payer: MEDICARE

## 2022-08-09 DIAGNOSIS — I48.11 LONGSTANDING PERSISTENT ATRIAL FIBRILLATION (HCC): ICD-10-CM

## 2022-08-09 LAB
ALBUMIN SERPL-MCNC: 3.6 G/DL (ref 3.4–5)
ALBUMIN/GLOB SERPL: 0.9 {RATIO} (ref 0.8–1.7)
ALP SERPL-CCNC: 143 U/L (ref 45–117)
ALT SERPL-CCNC: 19 U/L (ref 13–56)
ANION GAP SERPL CALC-SCNC: 12 MMOL/L (ref 3–18)
AST SERPL-CCNC: 19 U/L (ref 10–38)
BASOPHILS # BLD: 0.1 K/UL (ref 0–0.1)
BASOPHILS NFR BLD: 1 % (ref 0–2)
BILIRUB SERPL-MCNC: 0.5 MG/DL (ref 0.2–1)
BUN SERPL-MCNC: 43 MG/DL (ref 7–18)
BUN/CREAT SERPL: 6 (ref 12–20)
CALCIUM SERPL-MCNC: 9.6 MG/DL (ref 8.5–10.1)
CHLORIDE SERPL-SCNC: 102 MMOL/L (ref 100–111)
CO2 SERPL-SCNC: 28 MMOL/L (ref 21–32)
CREAT SERPL-MCNC: 7.22 MG/DL (ref 0.6–1.3)
DIFFERENTIAL METHOD BLD: ABNORMAL
EOSINOPHIL # BLD: 0.1 K/UL (ref 0–0.4)
EOSINOPHIL NFR BLD: 1 % (ref 0–5)
ERYTHROCYTE [DISTWIDTH] IN BLOOD BY AUTOMATED COUNT: 16 % (ref 11.6–14.5)
GLOBULIN SER CALC-MCNC: 4.2 G/DL (ref 2–4)
GLUCOSE SERPL-MCNC: 109 MG/DL (ref 74–99)
HCT VFR BLD AUTO: 40.2 % (ref 35–45)
HGB BLD-MCNC: 12.6 G/DL (ref 12–16)
IMM GRANULOCYTES # BLD AUTO: 0.1 K/UL (ref 0–0.04)
IMM GRANULOCYTES NFR BLD AUTO: 1 % (ref 0–0.5)
INR PPP: 1.3 (ref 0.8–1.2)
LYMPHOCYTES # BLD: 2.5 K/UL (ref 0.9–3.6)
LYMPHOCYTES NFR BLD: 25 % (ref 21–52)
MCH RBC QN AUTO: 32.2 PG (ref 24–34)
MCHC RBC AUTO-ENTMCNC: 31.3 G/DL (ref 31–37)
MCV RBC AUTO: 102.8 FL (ref 78–100)
MONOCYTES # BLD: 0.9 K/UL (ref 0.05–1.2)
MONOCYTES NFR BLD: 9 % (ref 3–10)
NEUTS SEG # BLD: 6.1 K/UL (ref 1.8–8)
NEUTS SEG NFR BLD: 62 % (ref 40–73)
NRBC # BLD: 0 K/UL (ref 0–0.01)
NRBC BLD-RTO: 0 PER 100 WBC
PLATELET # BLD AUTO: 308 K/UL (ref 135–420)
PMV BLD AUTO: 10.3 FL (ref 9.2–11.8)
POTASSIUM SERPL-SCNC: 4.9 MMOL/L (ref 3.5–5.5)
PROT SERPL-MCNC: 7.8 G/DL (ref 6.4–8.2)
PROTHROMBIN TIME: 16.2 SEC (ref 11.5–15.2)
RBC # BLD AUTO: 3.91 M/UL (ref 4.2–5.3)
SODIUM SERPL-SCNC: 142 MMOL/L (ref 136–145)
WBC # BLD AUTO: 9.8 K/UL (ref 4.6–13.2)

## 2022-08-09 PROCEDURE — 85610 PROTHROMBIN TIME: CPT

## 2022-08-09 PROCEDURE — 36415 COLL VENOUS BLD VENIPUNCTURE: CPT

## 2022-08-09 PROCEDURE — 85025 COMPLETE CBC W/AUTO DIFF WBC: CPT

## 2022-08-09 PROCEDURE — 80053 COMPREHEN METABOLIC PANEL: CPT

## 2022-08-09 NOTE — TELEPHONE ENCOUNTER
Called pt to review instructions with her. She said she plans on getting her son to take her this afternoon to [place] to get her lab work done because she says she has dialysis tomorrow. Pt had no questions. Content with call.

## 2022-08-15 ENCOUNTER — HOSPITAL ENCOUNTER (OUTPATIENT)
Dept: NON INVASIVE DIAGNOSTICS | Age: 77
Discharge: HOME | End: 2022-08-15
Attending: INTERNAL MEDICINE | Admitting: INTERNAL MEDICINE
Payer: MEDICARE

## 2022-08-15 ENCOUNTER — ANESTHESIA (OUTPATIENT)
Dept: NON INVASIVE DIAGNOSTICS | Age: 77
End: 2022-08-15
Payer: MEDICARE

## 2022-08-15 ENCOUNTER — ANESTHESIA EVENT (OUTPATIENT)
Dept: NON INVASIVE DIAGNOSTICS | Age: 77
End: 2022-08-15
Payer: MEDICARE

## 2022-08-15 VITALS
RESPIRATION RATE: 14 BRPM | TEMPERATURE: 98.6 F | HEART RATE: 59 BPM | WEIGHT: 194 LBS | HEIGHT: 62 IN | OXYGEN SATURATION: 95 % | DIASTOLIC BLOOD PRESSURE: 52 MMHG | BODY MASS INDEX: 35.7 KG/M2 | SYSTOLIC BLOOD PRESSURE: 111 MMHG

## 2022-08-15 DIAGNOSIS — I48.91 AFIB (HCC): ICD-10-CM

## 2022-08-15 PROCEDURE — 93325 DOPPLER ECHO COLOR FLOW MAPG: CPT

## 2022-08-15 PROCEDURE — 99153 MOD SED SAME PHYS/QHP EA: CPT

## 2022-08-15 PROCEDURE — 74011250636 HC RX REV CODE- 250/636: Performed by: ANESTHESIOLOGY

## 2022-08-15 PROCEDURE — 76060000031 HC ANESTHESIA FIRST 0.5 HR

## 2022-08-15 PROCEDURE — 01922 ANES N-INVAS IMG/RADJ THER: CPT | Performed by: ANESTHESIOLOGY

## 2022-08-15 PROCEDURE — 99100 ANES PT EXTEME AGE<1 YR&>70: CPT | Performed by: ANESTHESIOLOGY

## 2022-08-15 PROCEDURE — 99152 MOD SED SAME PHYS/QHP 5/>YRS: CPT

## 2022-08-15 PROCEDURE — 99220 PR INITIAL OBSERVATION CARE/DAY 70 MINUTES: CPT | Performed by: INTERNAL MEDICINE

## 2022-08-15 RX ORDER — PROPOFOL 10 MG/ML
INJECTION, EMULSION INTRAVENOUS AS NEEDED
Status: DISCONTINUED | OUTPATIENT
Start: 2022-08-15 | End: 2022-08-15 | Stop reason: HOSPADM

## 2022-08-15 RX ORDER — FENTANYL CITRATE 50 UG/ML
INJECTION, SOLUTION INTRAMUSCULAR; INTRAVENOUS AS NEEDED
Status: DISCONTINUED | OUTPATIENT
Start: 2022-08-15 | End: 2022-08-15 | Stop reason: HOSPADM

## 2022-08-15 RX ORDER — SODIUM CHLORIDE 0.9 % (FLUSH) 0.9 %
5-40 SYRINGE (ML) INJECTION EVERY 8 HOURS
Status: DISCONTINUED | OUTPATIENT
Start: 2022-08-15 | End: 2022-08-15 | Stop reason: HOSPADM

## 2022-08-15 RX ORDER — MIDAZOLAM HYDROCHLORIDE 1 MG/ML
INJECTION, SOLUTION INTRAMUSCULAR; INTRAVENOUS AS NEEDED
Status: DISCONTINUED | OUTPATIENT
Start: 2022-08-15 | End: 2022-08-15 | Stop reason: HOSPADM

## 2022-08-15 RX ORDER — SODIUM CHLORIDE 0.9 % (FLUSH) 0.9 %
5-40 SYRINGE (ML) INJECTION AS NEEDED
Status: DISCONTINUED | OUTPATIENT
Start: 2022-08-15 | End: 2022-08-15 | Stop reason: HOSPADM

## 2022-08-15 RX ORDER — SODIUM CHLORIDE 9 MG/ML
20 INJECTION INTRAMUSCULAR; INTRAVENOUS; SUBCUTANEOUS
Status: DISCONTINUED | OUTPATIENT
Start: 2022-08-15 | End: 2022-08-15 | Stop reason: HOSPADM

## 2022-08-15 RX ORDER — CLOPIDOGREL BISULFATE 75 MG/1
75 TABLET ORAL DAILY
Qty: 30 TABLET | Refills: 4 | Status: SHIPPED | OUTPATIENT
Start: 2022-08-15

## 2022-08-15 RX ADMIN — PROPOFOL 30 MG: 10 INJECTION, EMULSION INTRAVENOUS at 11:39

## 2022-08-15 RX ADMIN — FENTANYL CITRATE 50 MCG: 50 INJECTION, SOLUTION INTRAMUSCULAR; INTRAVENOUS at 11:38

## 2022-08-15 RX ADMIN — FENTANYL CITRATE 50 MCG: 50 INJECTION, SOLUTION INTRAMUSCULAR; INTRAVENOUS at 11:44

## 2022-08-15 RX ADMIN — MIDAZOLAM HYDROCHLORIDE 1 MG: 2 INJECTION, SOLUTION INTRAMUSCULAR; INTRAVENOUS at 11:43

## 2022-08-15 RX ADMIN — MIDAZOLAM HYDROCHLORIDE 1 MG: 2 INJECTION, SOLUTION INTRAMUSCULAR; INTRAVENOUS at 11:37

## 2022-08-15 RX ADMIN — PROPOFOL 20 MG: 10 INJECTION, EMULSION INTRAVENOUS at 11:44

## 2022-08-15 NOTE — DISCHARGE INSTRUCTIONS
DISCHARGE SUMMARY from Nurse:    Please continue taking your home medications as prescribed. PATIENT INSTRUCTIONS:    After general anesthesia or intravenous sedation, for 24 hours or while taking prescription Narcotics:  Limit your activities  Do not drive and operate hazardous machinery  Do not make important personal or business decisions  Do  not drink alcoholic beverages  If you have not urinated within 8 hours after discharge, please contact your surgeon on call. Report the following to your surgeon:  Excessive pain, swelling, redness or odor of or around the surgical area  Temperature over 100.5  Nausea and vomiting lasting longer than 4 hours or if unable to take medications  Any signs of decreased circulation or nerve impairment to extremity: change in color, persistent  numbness, tingling, coldness or increase pain  Any questions    What to do at Home:  Recommended activity: Activity as tolerated and no driving for today. If you experience any of the following symptoms difficulty swallowing, acute pain in chest or breathing difficulty please follow up with Dr. Yadi Torres MD.    *  Please give a list of your current medications to your Primary Care Provider. *  Please update this list whenever your medications are discontinued, doses are      changed, or new medications (including over-the-counter products) are added. *  Please carry medication information at all times in case of emergency situations. These are general instructions for a healthy lifestyle:    No smoking/ No tobacco products/ Avoid exposure to second hand smoke  Surgeon General's Warning:  Quitting smoking now greatly reduces serious risk to your health.     Obesity, smoking, and sedentary lifestyle greatly increases your risk for illness    A healthy diet, regular physical exercise & weight monitoring are important for maintaining a healthy lifestyle    You may be retaining fluid if you have a history of heart failure or if you experience any of the following symptoms:  Weight gain of 3 pounds or more overnight or 5 pounds in a week, increased swelling in our hands or feet or shortness of breath while lying flat in bed. Please call your doctor as soon as you notice any of these symptoms; do not wait until your next office visit. The discharge information has been reviewed with the patient. The patient verbalized understanding. Discharge medications reviewed with the patient and appropriate educational materials and side effects teaching were provided. ___________________________________________________________________________________________________________________________________     Transesophageal Echocardiogram: What to Expect at [address] [place] [address]  A transesophageal echocardiogram is a test to help your doctor look at the inside of your heart. A small device called a transducer directs sound waves toward your heart. The sound waves make a picture of the heart's valves and chambers. Before the test, your throat was sprayed with medicine to numb it. Your throat may be sore for a few days. You may have had a sedative to help you relax. You may be unsteady after having sedation. It can take a few hours for the medicine's effects to wear off. Common side effects include nausea, vomiting, and feeling sleepy or tired. This care sheet gives you a general idea about how long it will take for you to recover. But each person recovers at a different pace. Follow the steps below to feel better as quickly as possible. How can you care for yourself at home? Activity    If a sedative was used, your doctor will tell you when it is safe for you to do your normal activities. For your safety, do not drive or operate any machinery that could be dangerous. Wait until the medicine wears off and you can think clearly and react easily. Diet    Do not eat or drink until the numbness in your throat wears off.      When the numbness is gone, you can eat your normal diet. Throat lozenges and warm saltwater gargles can help relieve throat soreness. Throat lozenges can be used by people age 3 or older. And most people can gargle at age 6 and older. Do not drink alcohol for 24 hours. Follow-up care is a key part of your treatment and safety. Be sure to make and go to all appointments, and call your doctor if you are having problems. It's also a good idea to know your test results and keep a list of the medicines you take. When should you call for help? Call 911 anytime you think you may need emergency care. For example, call if:    Your stools are maroon or very bloody. You vomit blood or what looks like coffee grounds. Call your doctor now or seek immediate medical care if:    You have pain in your chest, belly, or back. You have new or worse trouble swallowing. You have trouble breathing. Watch closely for changes in your health, and be sure to contact your doctor if you have any problems. Current as of: January 10, 2022               Content Version: 13.2  © 2340-1778 [employer]. Care instructions adapted under license by [employer] (which disclaims liability or warranty for this information). If you have questions about a medical condition or this instruction, always ask your healthcare professional. [address] any warranty or liability for your use of this information. Langhar Activation    Thank you for requesting access to [employer]. Please follow the instructions below to securely access and download your online medical record. [employer]har allows you to send messages to your doctor, view your test results, renew your prescriptions, schedule appointments, and more. How Do I Sign Up? In your internet browser, go to [email or web address]. Click on the First Time User? Click Here link in the Sign In box.  You will see the New Member Sign Up page.   Corporation your Digiboot Access Code exactly as it appears below. You will not need to use this code after youve completed the sign-up process. If you do not sign up before the expiration date, you must request a new code. Viewglass Access Code: [email or web address]  Expires: 2022  4:12 PM (This is the date your Digiboot access code will )    Enter the last four digits of your Social Security Number (xxxx) and Date of Birth (mm/dd/yyyy) as indicated and click Submit. You will be taken to the next sign-up page. Create a Digiboot ID. This will be your Viewglass login ID and cannot be changed, so think of one that is secure and easy to remember. Create a Viewglass password. You can change your password at any time. Enter your Password Reset Question and Answer. This can be used at a later time if you forget your password. Enter your e-mail address. You will receive e-mail notification when new information is available in [address] Click Sign Up. You can now view and download portions of your medical record. Click the [place] link to download a portable copy of your medical information. Additional Information    If you have questions, please visit the Frequently Asked Questions section of the Viewglass website at [email or web address] Remember, Viewglass is NOT to be used for urgent needs. For medical emergencies, dial 911.    Patient armband removed and shredded

## 2022-08-15 NOTE — PROGRESS NOTES
Anesthesia name:  Catarino Gonzalez     Anesthesia is present for case. Refer to anesthesia log for vitals.   [facility]

## 2022-08-15 NOTE — ANESTHESIA POSTPROCEDURE EVALUATION
* No procedures listed *. MAC    Anesthesia Post Evaluation      Multimodal analgesia: multimodal analgesia used between 6 hours prior to anesthesia start to PACU discharge  Patient location during evaluation: PACU  Patient participation: complete - patient participated  Level of consciousness: awake  Pain score: 1  Airway patency: patent  Anesthetic complications: no  Cardiovascular status: acceptable  Respiratory status: acceptable  Hydration status: acceptable    Final Post Anesthesia Temperature Assessment:  Normothermia (36.0-37.5 degrees C)      INITIAL Post-op Vital signs: No vitals data found for the desired time range.

## 2022-08-15 NOTE — H&P
H&P Note    Date of Encounter: 8/15/2022  7:48 AM   Primary Care Physician:  Jocelyn Harp MD     Assessment:     -s/p Watchman GOLD device 6/27/22  -ESRD on HD  -h/o chest pain, cath this year negative for CAD  -Diabetes  -Chronic hypotension on midodrine  -Thyroid disorder  -PAF on eliquis  -h/o hematuria    Primary cardiologist Dr. Aquiles Khoury:     Plan CATINA, then switch to ASA/Plavix if device position ok. History of Present Illness: This is a 64 y.o. female admitted for Afib ([facility] [I48.91]. Patient complains of:    Here for followup CATINA since Watchman on 6/27/22. No recent chest pain, dyspnea. NO nausea, vomiting. No fevers, chills. No rash. No bleeding. Review of Symptoms:  Except as stated above include:  Constitutional:  Negative  Ears, nose, throat:  Negative  Respiratory:  negative  Cardiovascular:  negative  Gastrointestinal: negative  Genitourinary:  negative  Musculoskeletal:  Negative  Neurological:  Negative  Dermatological:  Negative  Hematological: Negative  Endocrinological: Negative  Allergy: Negative  Psychological:  Negative       Past Medical History:     Past Medical History:   Diagnosis Date    Anemia in end-stage renal disease ([facility]     Anticoagulated by anticoagulation treatment     On Apixaban    Chronic hypotension     On Midodrine    Chronic pain     Closed fracture of left inferior pubic ramus, with routine healing, subsequent encounter 11/12/2021    Closed fracture of superior pubic ramus, left, with routine healing, subsequent encounter 11/12/2021    Closed nondisplaced fracture of anterior wall of left acetabulum with routine healing 11/12/2021    Depression     End-stage renal disease on hemodialysis ([facility]     HD at [address] [facility] on MWF.  Tel # [phone]    Gastroesophageal reflux disease     Glaucoma     History of acute pyelonephritis 2/20/2020    History of hydronephrosis 10/5/2021    History of infection with vancomycin resistant Enterococcus (VRE) 10/8/2021    Urine culture (collected 10/8/2021, resulted 10/14/2021) yielded growth of >100,000 colonies/ml of Enterococcus faecalis RESISTANT to Ciprofloxacin, Levofloxacin, Tetracycline and Vancomycin    History of kidney stones     History of recurrent urinary tract infection     History of sepsis 6/18/2021    History of septic shock 10/8/2021    History of urethral stricture     History of urinary tract infection 10/8/2021    Urine culture (collected 10/8/2021, resulted 10/14/2021) yielded growth of >100,000 colonies/ml of Enterococcus faecalis RESISTANT to Ciprofloxacin, Levofloxacin, Tetracycline and Vancomycin    Hyperlipidemia     Hyperphosphatemia 11/14/2021    Hypothyroidism     Lung mass     Mononeuropathy     Involving ring finger of left hand    Need for prophylactic isolation 10/8/2021    Urine culture (collected 10/8/2021, resulted 10/14/2021) yielded growth of >100,000 colonies/ml of Enterococcus faecalis RESISTANT to Ciprofloxacin, Levofloxacin, Tetracycline and Vancomycin    Paroxysmal atrial fibrillation (HCC)     Secondary hyperparathyroidism of renal origin (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 [facility]     HbA1c (10/8/2021) = 4.6    Uric acid nephrolithiasis     Urinary incontinence          Social History:     Social History     Socioeconomic History    Marital status: SINGLE   Tobacco Use    Smoking status: Never    Smokeless tobacco: Never   Vaping Use    Vaping Use: Never used   Substance and Sexual Activity    Alcohol use: Never    Drug use: Never        Family History:     Family History   Problem Relation Age of Onset    Heart Surgery Sister         Medications:      Allergies   Allergen Reactions    Albumin, Human 25 % Itching     Headache - severe migraine like, itchy eyes, runny nose    Ciprofloxacin Hives    Cyclopentolate Unknown (comments)    Iron Sucrose Diarrhea    Statins-Hmg-Coa Reductase Inhibitors Other (comments)     Body ache        Current Facility-Administered Medications   Medication Dose Route Frequency    sodium chloride (NS) flush 5-40 mL  5-40 mL IntraVENous Q8H    sodium chloride (NS) flush 5-40 mL  5-40 mL IntraVENous PRN    0.9% NaCl bacteriostatic (NORMAL SALINE) 0.9 % injection 20 mL  20 mL IntraVENous NOW         Physical Exam:   Visit Vitals  BP (!) 90/51   Ht 5' 2\" (1.575 m)   Wt 93.4 kg (206 lb)   BMI 37.68 kg/m²     BP Readings from Last 3 Encounters:   08/15/22 (!) 90/51   07/05/22 (!) 90/51 06/30/22 118/68     Pulse Readings from Last 3 Encounters:   07/05/22 70   06/30/22 71   06/28/22 (!) 55     Wt Readings from Last 3 Encounters:   08/15/22 93.4 kg (206 lb)   07/05/22 93.4 kg (206 lb)   06/28/22 90.7 kg (200 lb)       General:  alert, cooperative, no distress, appears stated age  Neck:  nontender  Lungs:  clear to auscultation bilaterally  Heart:  regular rate and rhythm, S1, S2 normal, no murmur, click, rub or gallop  Abdomen:  abdomen is soft without significant tenderness, masses, organomegaly or guarding  Extremities:  extremities normal, atraumatic, no cyanosis or edema  Skin: Warm and dry. no hyperpigmentation, vitiligo, or suspicious lesions  Neuro: alert, oriented x3, affect appropriate, no focal neurological deficits, moves all extremities well, no involuntary movements, reflexes at knee and ankle intact  Psych: non focal     Data Review:     No results for input(s): WBC, HGB, HCT, PLT, HGBEXT, HCTEXT, PLTEXT in the last 72 hours. No results for input(s): NA, K, CL, CO2, GLU, BUN, CREA, CA, MG, PHOS, ALB, TBIL, ALT, INR, INREXT in the last 72 hours.     No lab exists for component: SGOT,  BNP    Results for orders placed or performed during the hospital encounter of 06/27/22   EKG, 12 LEAD, INITIAL   Result Value Ref Range    Ventricular Rate 57 BPM    QRS Duration 104 ms    Q-T Interval 476 ms    QTC Calculation (Bezet) 463 ms    Calculated R Axis -152 degrees    Calculated T Axis 145 degrees    Diagnosis       Sinus bradycardia  Right superior axis deviation  Abnormal ECG  When compared with ECG of 27-JUN-2022 07:19,  Sinus bradycardia has replaced Atrial fibrillation  Confirmed by uJlia Marquez MD, ----- ([id number]) on 6/27/2022 4:33:17 PM         All Cardiac Markers in the last 24 hours:  No results found for: CPK, CK, CKMMB, CKMB, RCK3, CKMBT, CKNDX, CKND1, PATTI, TROPT, TROIQ, GRAHAM, TROPT, TNIPOC, BNP, BNPP    Last Lipid:  No results found for: CHOL, CHOLX, CHLST, CHOLV, HDL, HDLP, LDL, LDLC, DLDLP, TGLX, TRIGL, TRIGP, CHHD, CHHDX    Signed By: Ray Torres MD     August 15, 2022

## 2022-08-15 NOTE — ANESTHESIA PREPROCEDURE EVALUATION
Relevant Problems   RESPIRATORY SYSTEM   (+) History of acute pyelonephritis   (+) History of infection with vancomycin resistant Enterococcus (VRE)   (+) History of recurrent urinary tract infection   (+) History of sepsis   (+) History of septic shock   (+) History of urinary tract infection   (+) SOB (shortness of breath) on exertion      NEUROLOGY   (+) Depression      CARDIOVASCULAR   (+) A-fib (HCC)   (+) Paroxysmal atrial fibrillation (HCC)      GASTROINTESTINAL   (+) Gastroesophageal reflux disease      RENAL FAILURE   (+) ESRD on dialysis ([facility])   (+) Uric acid nephrolithiasis      ENDOCRINE   (+) Hypothyroidism   (+) 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)      HEMATOLOGY   (+) Anemia in end-stage renal disease ([facility]       Anesthetic History   No history of anesthetic complications            Review of Systems / Medical History  Patient summary reviewed and pertinent labs reviewed    Pulmonary          Shortness of breath         Neuro/Psych         Psychiatric history    Comments: Chronic pain Cardiovascular            Dysrhythmias : atrial fibrillation      Exercise tolerance: <4 METS  Comments: EF 60%   GI/Hepatic/Renal     GERD    Renal disease: ESRD and dialysis       Endo/Other    Diabetes: type 2  Hypothyroidism  Anemia     Other Findings              Physical Exam    Airway  Mallampati: III  TM Distance: 4 - 6 cm  Neck ROM: normal range of motion   Mouth opening: Normal     Cardiovascular  Regular rate and rhythm,  S1 and S2 normal,  no murmur, click, rub, or gallop             Dental    Dentition: Poor dentition  Comments: Broken, chipped teeth   Pulmonary  Breath sounds clear to auscultation               Abdominal  GI exam deferred       Other Findings            Anesthetic Plan    ASA: 4  Anesthesia type: MAC          Induction: Intravenous  Anesthetic plan and risks discussed with: Patient

## 2022-08-18 ENCOUNTER — HOSPITAL ENCOUNTER (EMERGENCY)
Age: 77
Discharge: HOME | End: 2022-08-18
Attending: STUDENT IN AN ORGANIZED HEALTH CARE EDUCATION/TRAINING PROGRAM
Payer: MEDICARE

## 2022-08-18 ENCOUNTER — APPOINTMENT (OUTPATIENT)
Dept: GENERAL RADIOLOGY | Age: 77
End: 2022-08-18
Attending: PHYSICIAN ASSISTANT
Payer: MEDICARE

## 2022-08-18 ENCOUNTER — HOSPITAL ENCOUNTER (OUTPATIENT)
Age: 77
Setting detail: OUTPATIENT SURGERY
Discharge: HOME | End: 2022-08-18
Attending: STUDENT IN AN ORGANIZED HEALTH CARE EDUCATION/TRAINING PROGRAM | Admitting: STUDENT IN AN ORGANIZED HEALTH CARE EDUCATION/TRAINING PROGRAM
Payer: MEDICARE

## 2022-08-18 VITALS
RESPIRATION RATE: 16 BRPM | BODY MASS INDEX: 35.7 KG/M2 | SYSTOLIC BLOOD PRESSURE: 106 MMHG | DIASTOLIC BLOOD PRESSURE: 49 MMHG | WEIGHT: 194 LBS | OXYGEN SATURATION: 97 % | HEART RATE: 48 BPM | HEIGHT: 62 IN

## 2022-08-18 VITALS
RESPIRATION RATE: 16 BRPM | HEART RATE: 75 BPM | TEMPERATURE: 97.6 F | OXYGEN SATURATION: 100 % | DIASTOLIC BLOOD PRESSURE: 49 MMHG | SYSTOLIC BLOOD PRESSURE: 112 MMHG

## 2022-08-18 DIAGNOSIS — N18.6 ESRD (END STAGE RENAL DISEASE) (HCC): ICD-10-CM

## 2022-08-18 DIAGNOSIS — E87.5 ACUTE HYPERKALEMIA: Primary | ICD-10-CM

## 2022-08-18 DIAGNOSIS — I48.0 PAROXYSMAL ATRIAL FIBRILLATION (HCC): Primary | ICD-10-CM

## 2022-08-18 DIAGNOSIS — T82.9XXA COMPLICATION OF DIALYSIS ACCESS INSERTION: ICD-10-CM

## 2022-08-18 LAB
ALBUMIN SERPL-MCNC: 3.1 G/DL (ref 3.4–5)
ALBUMIN/GLOB SERPL: 0.8 {RATIO} (ref 0.8–1.7)
ALP SERPL-CCNC: 103 U/L (ref 45–117)
ALT SERPL-CCNC: 14 U/L (ref 13–56)
ANION GAP SERPL CALC-SCNC: 12 MMOL/L (ref 3–18)
ANION GAP SERPL CALC-SCNC: 12 MMOL/L (ref 3–18)
AST SERPL-CCNC: 19 U/L (ref 10–38)
BASOPHILS # BLD: 0.1 K/UL (ref 0–0.1)
BASOPHILS NFR BLD: 1 % (ref 0–2)
BILIRUB SERPL-MCNC: 0.4 MG/DL (ref 0.2–1)
BUN SERPL-MCNC: 68 MG/DL (ref 7–18)
BUN SERPL-MCNC: 69 MG/DL (ref 7–18)
BUN/CREAT SERPL: 7 (ref 12–20)
BUN/CREAT SERPL: 7 (ref 12–20)
CA-I BLD-MCNC: 0.95 MMOL/L (ref 1.12–1.32)
CALCIUM SERPL-MCNC: 8 MG/DL (ref 8.5–10.1)
CALCIUM SERPL-MCNC: 8 MG/DL (ref 8.5–10.1)
CHLORIDE BLD-SCNC: 111 MMOL/L (ref 100–108)
CHLORIDE SERPL-SCNC: 107 MMOL/L (ref 100–111)
CHLORIDE SERPL-SCNC: 108 MMOL/L (ref 100–111)
CO2 SERPL-SCNC: 19 MMOL/L (ref 21–32)
CO2 SERPL-SCNC: 20 MMOL/L (ref 21–32)
CREAT SERPL-MCNC: 10.3 MG/DL (ref 0.6–1.3)
CREAT SERPL-MCNC: 9.99 MG/DL (ref 0.6–1.3)
CREAT UR-MCNC: 11.4 MG/DL (ref 0.6–1.3)
DIFFERENTIAL METHOD BLD: ABNORMAL
EOSINOPHIL # BLD: 0.2 K/UL (ref 0–0.4)
EOSINOPHIL NFR BLD: 3 % (ref 0–5)
ERYTHROCYTE [DISTWIDTH] IN BLOOD BY AUTOMATED COUNT: 16.1 % (ref 11.6–14.5)
GLOBULIN SER CALC-MCNC: 3.8 G/DL (ref 2–4)
GLUCOSE BLD STRIP.AUTO-MCNC: 67 MG/DL (ref 74–106)
GLUCOSE SERPL-MCNC: 73 MG/DL (ref 74–99)
GLUCOSE SERPL-MCNC: 80 MG/DL (ref 74–99)
HBV SURFACE AG SER QL: <0.1 INDEX
HBV SURFACE AG SER QL: NEGATIVE
HCT VFR BLD AUTO: 30.6 % (ref 35–45)
HGB BLD-MCNC: 9.8 G/DL (ref 12–16)
IMM GRANULOCYTES # BLD AUTO: 0.1 K/UL (ref 0–0.04)
IMM GRANULOCYTES NFR BLD AUTO: 2 % (ref 0–0.5)
LYMPHOCYTES # BLD: 1.6 K/UL (ref 0.9–3.6)
LYMPHOCYTES NFR BLD: 28 % (ref 21–52)
MAGNESIUM SERPL-MCNC: 2.3 MG/DL (ref 1.6–2.6)
MCH RBC QN AUTO: 32.2 PG (ref 24–34)
MCHC RBC AUTO-ENTMCNC: 32 G/DL (ref 31–37)
MCV RBC AUTO: 100.7 FL (ref 78–100)
MONOCYTES # BLD: 0.5 K/UL (ref 0.05–1.2)
MONOCYTES NFR BLD: 8 % (ref 3–10)
NEUTS SEG # BLD: 3.5 K/UL (ref 1.8–8)
NEUTS SEG NFR BLD: 59 % (ref 40–73)
NRBC # BLD: 0 K/UL (ref 0–0.01)
NRBC BLD-RTO: 0 PER 100 WBC
PLATELET # BLD AUTO: 193 K/UL (ref 135–420)
PMV BLD AUTO: 10.1 FL (ref 9.2–11.8)
POTASSIUM BLD-SCNC: 5.6 MMOL/L (ref 3.5–5.5)
POTASSIUM SERPL-SCNC: 5.9 MMOL/L (ref 3.5–5.5)
POTASSIUM SERPL-SCNC: 6.2 MMOL/L (ref 3.5–5.5)
PROT SERPL-MCNC: 6.9 G/DL (ref 6.4–8.2)
RBC # BLD AUTO: 3.04 M/UL (ref 4.2–5.3)
SODIUM BLD-SCNC: 139 MMOL/L (ref 136–145)
SODIUM SERPL-SCNC: 139 MMOL/L (ref 136–145)
SODIUM SERPL-SCNC: 139 MMOL/L (ref 136–145)
WBC # BLD AUTO: 5.9 K/UL (ref 4.6–13.2)

## 2022-08-18 PROCEDURE — 94762 N-INVAS EAR/PLS OXIMTRY CONT: CPT

## 2022-08-18 PROCEDURE — 85025 COMPLETE CBC W/AUTO DIFF WBC: CPT

## 2022-08-18 PROCEDURE — 86704 HEP B CORE ANTIBODY TOTAL: CPT

## 2022-08-18 PROCEDURE — 83735 ASSAY OF MAGNESIUM: CPT

## 2022-08-18 PROCEDURE — 77030002933 HC SUT MCRYL J&J -A: Performed by: STUDENT IN AN ORGANIZED HEALTH CARE EDUCATION/TRAINING PROGRAM

## 2022-08-18 PROCEDURE — 99152 MOD SED SAME PHYS/QHP 5/>YRS: CPT | Performed by: STUDENT IN AN ORGANIZED HEALTH CARE EDUCATION/TRAINING PROGRAM

## 2022-08-18 PROCEDURE — 90935 HEMODIALYSIS ONE EVALUATION: CPT

## 2022-08-18 PROCEDURE — 74011000250 HC RX REV CODE- 250: Performed by: PHYSICIAN ASSISTANT

## 2022-08-18 PROCEDURE — 87340 HEPATITIS B SURFACE AG IA: CPT

## 2022-08-18 PROCEDURE — C1894 INTRO/SHEATH, NON-LASER: HCPCS | Performed by: STUDENT IN AN ORGANIZED HEALTH CARE EDUCATION/TRAINING PROGRAM

## 2022-08-18 PROCEDURE — 93005 ELECTROCARDIOGRAM TRACING: CPT

## 2022-08-18 PROCEDURE — 74011250637 HC RX REV CODE- 250/637: Performed by: PHYSICIAN ASSISTANT

## 2022-08-18 PROCEDURE — 77030013744: Performed by: STUDENT IN AN ORGANIZED HEALTH CARE EDUCATION/TRAINING PROGRAM

## 2022-08-18 PROCEDURE — 76937 US GUIDE VASCULAR ACCESS: CPT | Performed by: STUDENT IN AN ORGANIZED HEALTH CARE EDUCATION/TRAINING PROGRAM

## 2022-08-18 PROCEDURE — C1725 CATH, TRANSLUMIN NON-LASER: HCPCS | Performed by: STUDENT IN AN ORGANIZED HEALTH CARE EDUCATION/TRAINING PROGRAM

## 2022-08-18 PROCEDURE — 74011000250 HC RX REV CODE- 250: Performed by: STUDENT IN AN ORGANIZED HEALTH CARE EDUCATION/TRAINING PROGRAM

## 2022-08-18 PROCEDURE — 74011250637 HC RX REV CODE- 250/637: Performed by: INTERNAL MEDICINE

## 2022-08-18 PROCEDURE — 36902 INTRO CATH DIALYSIS CIRCUIT: CPT | Performed by: STUDENT IN AN ORGANIZED HEALTH CARE EDUCATION/TRAINING PROGRAM

## 2022-08-18 PROCEDURE — 80047 BASIC METABLC PNL IONIZED CA: CPT

## 2022-08-18 PROCEDURE — 77030013519 HC DEV INFL BASIX MRTM -B: Performed by: STUDENT IN AN ORGANIZED HEALTH CARE EDUCATION/TRAINING PROGRAM

## 2022-08-18 PROCEDURE — 86706 HEP B SURFACE ANTIBODY: CPT

## 2022-08-18 PROCEDURE — 74011250636 HC RX REV CODE- 250/636: Performed by: STUDENT IN AN ORGANIZED HEALTH CARE EDUCATION/TRAINING PROGRAM

## 2022-08-18 PROCEDURE — 71045 X-RAY EXAM CHEST 1 VIEW: CPT

## 2022-08-18 PROCEDURE — 74011000636 HC RX REV CODE- 636: Performed by: STUDENT IN AN ORGANIZED HEALTH CARE EDUCATION/TRAINING PROGRAM

## 2022-08-18 PROCEDURE — 80053 COMPREHEN METABOLIC PANEL: CPT

## 2022-08-18 PROCEDURE — G0257 UNSCHED DIALYSIS ESRD PT HOS: HCPCS

## 2022-08-18 RX ORDER — SODIUM CHLORIDE 0.9 % (FLUSH) 0.9 %
5-40 SYRINGE (ML) INJECTION AS NEEDED
Status: DISCONTINUED | OUTPATIENT
Start: 2022-08-18 | End: 2022-08-19 | Stop reason: HOSPADM

## 2022-08-18 RX ORDER — ASPIRIN 81 MG/1
81 TABLET ORAL DAILY
Qty: 30 TABLET | Refills: 5 | Status: SHIPPED | OUTPATIENT
Start: 2022-08-18

## 2022-08-18 RX ORDER — MIDODRINE HYDROCHLORIDE 5 MG/1
5 TABLET ORAL
Status: DISCONTINUED | OUTPATIENT
Start: 2022-08-18 | End: 2022-08-19 | Stop reason: HOSPADM

## 2022-08-18 RX ORDER — LIDOCAINE HYDROCHLORIDE 10 MG/ML
INJECTION, SOLUTION EPIDURAL; INFILTRATION; INTRACAUDAL; PERINEURAL AS NEEDED
Status: DISCONTINUED | OUTPATIENT
Start: 2022-08-18 | End: 2022-08-18 | Stop reason: HOSPADM

## 2022-08-18 RX ORDER — MIDAZOLAM HYDROCHLORIDE 1 MG/ML
INJECTION, SOLUTION INTRAMUSCULAR; INTRAVENOUS AS NEEDED
Status: DISCONTINUED | OUTPATIENT
Start: 2022-08-18 | End: 2022-08-18 | Stop reason: HOSPADM

## 2022-08-18 RX ORDER — SODIUM CHLORIDE 0.9 % (FLUSH) 0.9 %
5-40 SYRINGE (ML) INJECTION EVERY 8 HOURS
Status: DISCONTINUED | OUTPATIENT
Start: 2022-08-18 | End: 2022-08-19 | Stop reason: HOSPADM

## 2022-08-18 RX ORDER — LIDOCAINE 4 G/100G
1 PATCH TOPICAL
Status: DISCONTINUED | OUTPATIENT
Start: 2022-08-18 | End: 2022-08-19 | Stop reason: HOSPADM

## 2022-08-18 RX ORDER — SODIUM CHLORIDE 9 MG/ML
250 INJECTION, SOLUTION INTRAVENOUS
Status: DISCONTINUED | OUTPATIENT
Start: 2022-08-18 | End: 2022-08-19 | Stop reason: HOSPADM

## 2022-08-18 RX ORDER — ACETAMINOPHEN 325 MG/1
650 TABLET ORAL
Status: COMPLETED | OUTPATIENT
Start: 2022-08-18 | End: 2022-08-18

## 2022-08-18 RX ORDER — DEXTROSE MONOHYDRATE 100 MG/ML
250 INJECTION, SOLUTION INTRAVENOUS ONCE
Status: DISCONTINUED | OUTPATIENT
Start: 2022-08-18 | End: 2022-08-18

## 2022-08-18 RX ORDER — FENTANYL CITRATE 50 UG/ML
INJECTION, SOLUTION INTRAMUSCULAR; INTRAVENOUS AS NEEDED
Status: DISCONTINUED | OUTPATIENT
Start: 2022-08-18 | End: 2022-08-18 | Stop reason: HOSPADM

## 2022-08-18 RX ADMIN — ACETAMINOPHEN 650 MG: 325 TABLET ORAL at 11:50

## 2022-08-18 RX ADMIN — MIDODRINE HYDROCHLORIDE 5 MG: 5 TABLET ORAL at 13:41

## 2022-08-18 NOTE — ED PROVIDER NOTES
EMERGENCY DEPARTMENT HISTORY AND PHYSICAL EXAM        Date: 8/18/2022  Patient Name: Nicole Borges    History of Presenting Illness     Chief Complaint   Patient presents with    Other     Patient sent for dialysis       History Provided By: Patient    HPI: Nicole Borges, 64 y.o. female PMHx significant for anemia, ESRD, hyperparathyroidism, A. fib, DM, GERD, lipidemia, hypothyroidism, glaucoma, depression presents ambulatory to the ED. Patient reports she receives dialysis T/Th/Sat. Patient reports at her scheduled dialysis on Tuesday her \"dialysis catheter blew\". Patient reports received about 2-1/2 hours of dialysis when she usually receives about 3 to 4 hours of dialysis. Patient reports she went to the [facility] where they performed procedure and dialysis catheter is now operating. Patient presents today for dialysis. Patient denies CP, SOB, dizziness. There are no other complaints, changes, or physical findings at this time. PCP: Fidel Alcocer MD    Current Facility-Administered Medications on File Prior to Encounter   Medication Dose Route Frequency Provider Last Rate Last Admin    [DISCONTINUED] midazolam (VERSED) injection    PRN Sindy Lemon MD   1 mg at 08/18/22 2206    [DISCONTINUED] fentaNYL citrate (PF) injection    PRN Sindy Lemon MD   25 mcg at 08/18/22 9863    [DISCONTINUED] lidocaine (PF) (XYLOCAINE) 10 mg/mL (1 %) injection    PRN Sindy Lemon MD   1 mL at 08/18/22 8076    [DISCONTINUED] iopamidoL (ISOVUE 300) 61 % contrast injection    PRN Sindy Lemon MD   12 mL at 08/18/22 0945     Current Outpatient Medications on File Prior to Encounter   Medication Sig Dispense Refill    clopidogreL (Plavix) 75 mg tab Take 1 Tablet by mouth in the morning. 30 Tablet 4    gabapentin (NEURONTIN) 100 mg capsule Take 1 Capsule by mouth nightly. Max Daily Amount: 100 mg.  Indications: concern for back pain post dialysis 30 Capsule 0    melatonin 5 mg tablet Take 5 mg by mouth nightly. HYDROmorphone (DILAUDID) 2 mg tablet Take 1 mg by mouth every eight (8) hours as needed for Pain. Take 1/2 tablet by mouth every 8 hours as needed for pain level of 5 out of 10 or greater (Patient not taking: Reported on 8/18/2022)      allopurinoL (ZYLOPRIM) 100 mg tablet Take 1 Tablet by mouth every Monday, Wednesday, Friday. [after hemodialysis]  Indications: treatment to prevent acute gout attack 12 Tablet 0    amiodarone (CORDARONE) 200 mg tablet Take 1 Tablet by mouth daily. Indications: prevention of recurrent atrial fibrillation 30 Tablet 0    sevelamer carbonate (RENVELA) 800 mg tab tab Take 2 Tablets by mouth three (3) times daily (with meals). Indications: renal osteodystrophy with hyperphosphatemia 180 Tablet 0    acetaminophen (Tylenol Extra Strength) 500 mg tablet Take 1 Tablet by mouth every six (6) hours as needed for Pain. (Patient not taking: Reported on 8/18/2022) 30 Tablet 0    lidocaine (LIDODERM) 5 % Apply patch to the affected area for 12 hours a day and remove for 12 hours a day. (Patient not taking: Reported on 8/18/2022) 1 Each 0    meclizine (ANTIVERT) 25 mg tablet Take 25 mg by mouth three (3) times daily as needed for Dizziness. (Patient not taking: Reported on 8/18/2022)      methoxy peg-epoetin beta (MIRCERA INJECTION) 30 mcg by IntraVENous route. in HD      DULoxetine (CYMBALTA) 20 mg capsule Take 20 mg by mouth daily. estradioL (Estrace) 0.01 % (0.1 mg/gram) vaginal cream Apply a fingertip amount around the urethra three times a week. 30 g 3    biotin 1,000 mcg chew Take 1 Tab by mouth daily. cyanocobalamin 1,000 mcg tablet Take 1,000 mcg by mouth daily. lactobacillus sp. 50 billion cpu (BIO-K PLUS) 50 billion cell -375 mg cap capsule Take 1 Cap by mouth daily. 30 Cap 2    ascorbic acid, vitamin C, (VITAMIN C) 500 mg tablet Take 500 mg by mouth daily.       cholecalciferol (VITAMIN D3) (2,000 UNITS /50 MCG) cap capsule Take 2,000 Units by mouth two (2) times a day. latanoprost (XALATAN) 0.005 % ophthalmic solution Administer 1 Drop to both eyes nightly. One drop at bedtime      levothyroxine (SYNTHROID) 125 mcg tablet Take 125 mcg by mouth Daily (before breakfast). omeprazole (PRILOSEC) 20 mg capsule Take 20 mg by mouth daily. ondansetron (ZOFRAN ODT) 4 mg disintegrating tablet Take 4 mg by mouth every eight (8) hours as needed for Nausea or Vomiting. (Patient not taking: Reported on 8/18/2022)      vit B Cmplx 3-FA-Vit C-Biotin (NEPHRO HOWIE RX) 1- mg-mg-mcg tablet Take 1 Tab by mouth daily. Past History     Past Medical History:  Past Medical History:   Diagnosis Date    Anemia in end-stage renal disease ([facility]     Anticoagulated by anticoagulation treatment     On Apixaban    Chronic hypotension     On Midodrine    Chronic pain     Closed fracture of left inferior pubic ramus, with routine healing, subsequent encounter 11/12/2021    Closed fracture of superior pubic ramus, left, with routine healing, subsequent encounter 11/12/2021    Closed nondisplaced fracture of anterior wall of left acetabulum with routine healing 11/12/2021    Depression     End-stage renal disease on hemodialysis ([facility]     HD at [facility] on [facility] [address].  Tel # [phone]    Gastroesophageal reflux disease     Glaucoma     History of acute pyelonephritis 2/20/2020    History of hydronephrosis 10/5/2021    History of infection with vancomycin resistant Enterococcus (VRE) 10/8/2021    Urine culture (collected 10/8/2021, resulted 10/14/2021) yielded growth of >100,000 colonies/ml of Enterococcus faecalis RESISTANT to Ciprofloxacin, Levofloxacin, Tetracycline and Vancomycin    History of kidney stones     History of recurrent urinary tract infection     History of sepsis 6/18/2021    History of septic shock 10/8/2021    History of urethral stricture     History of urinary tract infection 10/8/2021    Urine culture (collected 10/8/2021, resulted 10/14/2021) yielded growth of >100,000 colonies/ml of Enterococcus faecalis RESISTANT to Ciprofloxacin, Levofloxacin, Tetracycline and Vancomycin    Hyperlipidemia     Hyperphosphatemia 11/14/2021    Hypothyroidism     Lung mass     Mononeuropathy     Involving ring finger of left hand    Need for prophylactic isolation 10/8/2021    Urine culture (collected 10/8/2021, resulted 10/14/2021) yielded growth of >100,000 colonies/ml of Enterococcus faecalis RESISTANT to Ciprofloxacin, Levofloxacin, Tetracycline and Vancomycin    Paroxysmal atrial fibrillation (HCC)     Secondary hyperparathyroidism of renal origin ([facility]     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 ([facility]     HbA1c (10/8/2021) = 4.6    Uric acid nephrolithiasis     Urinary incontinence        Past Surgical History:  Past Surgical History:   Procedure Laterality Date    HX APPENDECTOMY      HX CHOLECYSTECTOMY      HX GASTRIC BYPASS      Gastric stapling    HX KNEE ARTHROSCOPY      HX UROLOGICAL      right PCN placement    HX UROLOGICAL  07/23/2018    RIGHT URETEROSCOPY WITH HOLMIUM LASER    IR EXCHANGE NEPHRO PERC LT SI  2/21/2020    IR EXCHANGE NEPHRO PERC RT SI  4/13/2020    IR EXCHANGE NEPHRO PERC RT SI  7/17/2020    IR NEPHROSTOMY PERC RT PLC CATH  SI  10/14/2020    IR NEPHROURETERAL PERC RT PLC CATH NEW ACCESS  SI  4/30/2020    DE INTRO CATH DIALYSIS CIRCUIT DX ANGRPH FLUOR S&I Left 9/24/2020    FISTULOGRAM LEFT/poss permanent catheter placement performed by Madeleine Garcia MD at [facility] C[facility] LAB    VASCULAR SURGERY PROCEDURE UNLIST      lef AVF       Family History:  Family History   Problem Relation Age of Onset    Heart Surgery Sister        Social History:  Social History     Tobacco Use    Smoking status: Never    Smokeless tobacco: Never   Vaping Use    Vaping Use: Never used   Substance Use Topics    Alcohol use: Never    Drug use: Never       Allergies:   Allergies   Allergen Reactions    Albumin, Human 25 % Itching     Headache - severe migraine like, itchy eyes, runny nose    Ciprofloxacin Hives    Cyclopentolate Unknown (comments)    Iron Sucrose Diarrhea    Statins-Hmg-Coa Reductase Inhibitors Other (comments)     Body ache         Review of Systems   Review of Systems   Constitutional:  Negative for chills and fever. Respiratory:  Negative for shortness of breath. Cardiovascular:  Negative for chest pain. Gastrointestinal:  Negative for abdominal pain, nausea and vomiting. Genitourinary:  Negative for flank pain. Musculoskeletal:  Negative for back pain and myalgias. Skin:  Negative for color change, pallor, rash and wound. Neurological:  Negative for dizziness, weakness and light-headedness. All other systems reviewed and are negative. Physical Exam   Physical Exam  Vitals and nursing note reviewed. Constitutional:       General: She is not in acute distress. Appearance: She is well-developed. Comments: Pt in NAD   HENT:      Head: Normocephalic and atraumatic. Eyes:      Conjunctiva/sclera: Conjunctivae normal.   Cardiovascular:      Rate and Rhythm: Normal rate and regular rhythm. Heart sounds: Normal heart sounds. Comments: No lower leg swelling b/l  Pulmonary:      Effort: Pulmonary effort is normal. No respiratory distress. Breath sounds: Normal breath sounds. Comments: Lungs CTA  Not working to breathe  Abdominal:      General: Bowel sounds are normal. There is no distension. Palpations: Abdomen is soft. Musculoskeletal:         General: Normal range of motion. Skin:     General: Skin is warm. Findings: No rash. Comments: Catheter in place to upper arm   Neurological:      Mental Status: She is alert and oriented to person, place, and time.    Psychiatric:         Behavior: Behavior normal.       Diagnostic Study Results     Labs -     Recent Results (from the past 12 hour(s))   POC CHEM8    Collection Time: 08/18/22  7:53 AM   Result Value Ref Range    Glucose, POC 67 (L) 74 - 106 MG/DL    Creatinine, POC 11.4 (H) 0.6 - 1.3 MG/DL    GFRAA, POC 4 (L) >60 ml/min/1.73m2    GFRNA, POC 3 (L) >60 ml/min/1.73m2    Sodium,  136 - 145 MMOL/L    Potassium, POC 5.6 (H) 3.5 - 5.5 MMOL/L    Calcium, ionized (POC) 0.95 (L) 1.12 - 1.32 mmol/L    Chloride,  (H) 100 - 531 MMOL/L   METABOLIC PANEL, BASIC    Collection Time: 08/18/22  9:40 AM   Result Value Ref Range    Sodium 139 136 - 145 mmol/L    Potassium 5.9 (H) 3.5 - 5.5 mmol/L    Chloride 107 100 - 111 mmol/L    CO2 20 (L) 21 - 32 mmol/L    Anion gap 12 3.0 - 18 mmol/L    Glucose 80 74 - 99 mg/dL    BUN 69 (H) 7.0 - 18 MG/DL    Creatinine 9.99 (H) 0.6 - 1.3 MG/DL    BUN/Creatinine ratio 7 (L) 12 - 20      GFR est AA 5 (L) >60 ml/min/1.73m2    GFR est non-AA 4 (L) >60 ml/min/1.73m2    Calcium 8.0 (L) 8.5 - 10.1 MG/DL   CBC WITH AUTOMATED DIFF    Collection Time: 08/18/22 12:00 PM   Result Value Ref Range    WBC 5.9 4.6 - 13.2 K/uL    RBC 3.04 (L) 4.20 - 5.30 M/uL    HGB 9.8 (L) 12.0 - 16.0 g/dL    HCT 30.6 (L) 35.0 - 45.0 %    .7 (H) 78.0 - 100.0 FL    MCH 32.2 24.0 - 34.0 PG    MCHC 32.0 31.0 - 37.0 g/dL    RDW 16.1 (H) 11.6 - 14.5 %    PLATELET 164 768 - 071 K/uL    MPV 10.1 9.2 - 11.8 FL    NRBC 0.0 0  WBC    ABSOLUTE NRBC 0.00 0.00 - 0.01 K/uL    NEUTROPHILS 59 40 - 73 %    LYMPHOCYTES 28 21 - 52 %    MONOCYTES 8 3 - 10 %    EOSINOPHILS 3 0 - 5 %    BASOPHILS 1 0 - 2 %    IMMATURE GRANULOCYTES 2 (H) 0.0 - 0.5 %    ABS. NEUTROPHILS 3.5 1.8 - 8.0 K/UL    ABS. LYMPHOCYTES 1.6 0.9 - 3.6 K/UL    ABS. MONOCYTES 0.5 0.05 - 1.2 K/UL    ABS. EOSINOPHILS 0.2 0.0 - 0.4 K/UL    ABS. BASOPHILS 0.1 0.0 - 0.1 K/UL    ABS. IMM.  GRANS. 0.1 (H) 0.00 - 0.04 K/UL    DF AUTOMATED     METABOLIC PANEL, COMPREHENSIVE    Collection Time: 08/18/22 12:00 PM   Result Value Ref Range    Sodium 139 136 - 145 mmol/L    Potassium 6.2 (HH) 3.5 - 5.5 mmol/L    Chloride 108 100 - 111 mmol/L    CO2 19 (L) 21 - 32 mmol/L    Anion gap 12 3.0 - 18 mmol/L Glucose 73 (L) 74 - 99 mg/dL    BUN 68 (H) 7.0 - 18 MG/DL    Creatinine 10.30 (H) 0.6 - 1.3 MG/DL    BUN/Creatinine ratio 7 (L) 12 - 20      GFR est AA 4 (L) >60 ml/min/1.73m2    GFR est non-AA 4 (L) >60 ml/min/1.73m2    Calcium 8.0 (L) 8.5 - 10.1 MG/DL    Bilirubin, total 0.4 0.2 - 1.0 MG/DL    ALT (SGPT) 14 13 - 56 U/L    AST (SGOT) 19 10 - 38 U/L    Alk. phosphatase 103 45 - 117 U/L    Protein, total 6.9 6.4 - 8.2 g/dL    Albumin 3.1 (L) 3.4 - 5.0 g/dL    Globulin 3.8 2.0 - 4.0 g/dL    A-G Ratio 0.8 0.8 - 1.7     MAGNESIUM    Collection Time: 08/18/22 12:00 PM   Result Value Ref Range    Magnesium 2.3 1.6 - 2.6 mg/dL   EKG, 12 LEAD, INITIAL    Collection Time: 08/18/22 12:21 PM   Result Value Ref Range    Ventricular Rate 47 BPM    Atrial Rate 63 BPM    QRS Duration 102 ms    Q-T Interval 492 ms    QTC Calculation (Bezet) 435 ms    Calculated R Axis -15 degrees    Calculated T Axis 55 degrees    Diagnosis       Atrial fibrillation with slow ventricular response  Abnormal ECG  When compared with ECG of 27-JUN-2022 13:46,  Atrial fibrillation has replaced Sinus rhythm  QRS axis shifted right     HEP B SURFACE AG    Collection Time: 08/18/22  2:45 PM   Result Value Ref Range    Hepatitis B surface Ag <0.10 <1.00 Index    Hep B surface Ag Interp. Negative NEG         Radiologic Studies -   XR CHEST PORT   Final Result      No clearly acute findings. Upper limits normal cardiac silhouette size and   likely right midlung atelectasis/scar. CT Results  (Last 48 hours)      None          CXR Results  (Last 48 hours)                 08/18/22 1139  XR CHEST PORT Final result    Impression:      No clearly acute findings. Upper limits normal cardiac silhouette size and   likely right midlung atelectasis/scar. Narrative:  EXAMINATION: Chest single view       INDICATION: Chest pain       COMPARISON: 5/25/2022       FINDINGS: Single frontal view the chest obtained. Upper limits normal cardiac   silhouette. Minimal aortic arch calcifications. Pulmonary vasculature   unremarkable. Right midlung course streaky atelectasis/scar. No definite   pneumothorax. No obvious acute osseous findings. Medical Decision Making   I am the first provider for this patient. I reviewed the vital signs, available nursing notes, past medical history, past surgical history, family history and social history. Vital Signs-Reviewed the patient's vital signs. Patient Vitals for the past 12 hrs:   Temp Pulse Resp BP SpO2   08/18/22 1900 -- -- -- (!) 112/49 100 %   08/18/22 1813 -- -- 16 (!) 112/48 99 %   08/18/22 1646 -- 75 18 (!) 113/50 --   08/18/22 1639 -- 76 18 (!) 118/55 --   08/18/22 1630 -- 80 18 120/61 --   08/18/22 1615 -- 73 18 (!) 120/57 --   08/18/22 1600 -- (!) 56 18 (!) 113/44 --   08/18/22 1545 -- (!) 56 18 (!) 114/50 --   08/18/22 1530 -- (!) 54 18 115/72 --   08/18/22 1515 -- (!) 58 18 (!) 101/48 --   08/18/22 1500 -- (!) 57 18 133/60 --   08/18/22 1445 -- (!) 57 18 (!) 100/51 --   08/18/22 1430 -- (!) 57 18 (!) 103/47 --   08/18/22 1415 -- (!) 57 18 (!) 96/43 --   08/18/22 1400 -- (!) 54 18 (!) 95/51 --   08/18/22 1345 -- 61 18 (!) 101/58 --   08/18/22 1341 -- (!) 47 -- (!) 99/58 --   08/18/22 1337 -- (!) 47 18 (!) 99/58 --   08/18/22 1330 97.6 °F (36.4 °C) (!) 51 18 (!) 94/40 --   08/18/22 1137 98.4 °F (36.9 °C) (!) 49 23 112/80 100 %       Records Reviewed: Nursing Notes, Old Medical Records, Previous Radiology Studies, and Previous Laboratory Studies    Provider Notes (Medical Decision Making):   DDx: Dialysis, Electrolyte abnormality    65 yo F who presents for dialysis. Catheter was blocked and patient underwent fistulogram this morning. Catheter is now working appropriately. Creatinine and potassium elevated. Patient received dialysis and is well-appearing after. Patient stable for d/c home. At time of discharge, pt non-toxic appearing in NAD.  Pt stable for prompt outpatient follow-up with PCP 1 to 2 days. Patient given strict instructions to return if symptoms worsen. ED Course:   Initial assessment performed. The patients presenting problems have been discussed, and they are in agreement with the care plan formulated and outlined with them. I have encouraged them to ask questions as they arise throughout their visit. 1251: Spoke with Dr Juarez Lee, consult nephrology. Discussed patient's elevated creatinine and potassium. He recommends dialysis and states he will order. 1926: Pt re-evaluated after dialysis. Blood pressure stable. Patient reports she is feeling well and stable for discharge home. Disposition:  7:46 PM  Discussed lab and imaging results with pt along with dx and treatment plan. Discussed importance of PCP follow up. All questions answered. Pt voiced they understood. Return if sx worsen. PLAN:  1. Current Discharge Medication List        2. Follow-up Information       Follow up With Specialties Details Why Contact Info    Corry Frausto MD Family Medicine Schedule an appointment as soon as possible for a visit in 1 day  [facility] [id number]  [phone]      [facility] EMERGENCY DEPT Emergency Medicine  As needed, If symptoms worsen [address] [facility] [address]  [phone]          Return to ED if worse     Diagnosis     Clinical Impression:   1. Acute hyperkalemia    2. ESRD (end stage renal disease) ([facility]        Attestations:    Durenda Goltz, PA    Please note that this dictation was completed with [employer], the computer voice recognition software. Quite often unanticipated grammatical, syntax, homophones, and other interpretive errors are inadvertently transcribed by the computer software. Please disregard these errors. Please excuse any errors that have escaped final proofreading. Thank you.

## 2022-08-18 NOTE — ED TRIAGE NOTES
Patient seen at Cardiac cath clinic for dialysis fistula problems, state on Tuesday the dialysis catheter \"blew\" during 2 hours into dialysis.  Patient went to have labs done today and noted they were all \"off\" sent to ED for dialysis

## 2022-08-18 NOTE — Clinical Note
TRANSFER - OUT REPORT:     Verbal report given to: JOSE ANTONIO Argueta. Report consisted of patient's Situation, Background, Assessment and   Recommendations(SBAR). Opportunity for questions and clarification was provided. Patient transported with a Cardiac Cath Tech / Patient Care Tech. Patient transported to: holding area.

## 2022-08-18 NOTE — PROGRESS NOTES
Urgent request for HD. Patient arrived from Emergency Room. Received pre HD report from NICOLE Ramirez RN. Pt on stretcher, A+O x4, no s/s of distress noted. Accessed AVF Left upper arm w/15G needle per protocol. Tx initiated at 1337. AVF flowing with ease. For hemodynamic stability UF goal 2500 ml. Offered assistance with repositioning every 2 hours. Vascular access visible at all times throughout entire duration of treatment and line connections remain intact throughout entire duration of treatment. Tx completed at 1639, tolerated well 2L removed. De-accessed per protocol. Clot time 5 minutes for arterial, and 5 minutes for venous. Unit nurse given report.                       ACUTE HEMODIALYSIS FLOW SHEET      HEMODIALYSIS ORDERS: Physician:  Afua Wolfe     Dialyzer: Revaclear   Duration: 3   BFR: 350  DFR: 600   Dialysate:  Temp 36-37*C   K+  2    Ca+ 2.5   Na 138  Bicarb 35   Wt Readings from Last 1 Encounters:        Patient Chart [x]   Unable to Obtain []  Dry weight/UF Goal:2500   Heparin []  Bolus    Units    [] Hourly    Units    [x]None       Pre BP  94/40    Pulse: 51 Respirations: 16 Temp: 97.6 [x] temporal  [] Ax  [] Esoph   Labs: []  Pre  []  Post:   [x] N/A   Additional Orders (medications, blood products, hypotension management): [x] Yes   [] No          GRAFT/FISTULA ACCESS:   []N/A     []Right     [x]Left     [x]UE     []LE   []AVG   [x]AVF       [x]Medical Aseptic Prep Utilized   [x]No S/S infection  []Redness  []Drainage [] Cultured  [] Swelling  [] Pain  Bruit:   [x] Strong    [] Weak       Thrill :   [x] Strong    [] Weak     Needle Gauge: 15   Length: 1 inch   If access problem,  notified: []Yes     [x]N/A                GENERAL ASSESSMENT:    LUNGS:  Resp Rate 16   [x] Clear  [] Coarse  [] Crackles  [] Wheezing  [] Diminished                                                           [] RLL   [] LLL  [] RUL   [] LALI            Respirations:  [x]Easy  []Labored  []N/A  Cough: []Productive  []Dry  []N/A               Therapy:  [x]RA   [] Ventilated   [] Intubated   [] Trach            O2 Device:  [] NC   [] NRB  [] Trach Mask  [] BiPaP  Flow:   l/min                                                    CARDIAC: [x] Regular      [] Irregular   [] Rhythm:          [] Monitored   [] Bedside   [] Remotely monitored       EDEMA: [] None   [x]Generalized  [] Pitting [] 1+   [] 2 +   [] 3+    [] 4+        SKIN:   [] Hot     [] Cold    [x] Warm   [x] Dry    [] Diaphoretic                 [] Flushed  [] Jaundiced  [] Cyanotic  [] Pale      LOC:    [x] Alert      [x]Oriented:    [x] Person     [x] Place   [x]Time               [] Confused  [] Lethargic  [] Medicated  [] Non-responsive  [] Non-Verbal     GI / ABDOMEN:                     [] Flat    [] Distended    [x] Soft    [] Firm   []  Obese                   [] Diarrhea   [] FMS [] Bowel Sounds  [] Nausea  [] Vomiting                   [] NGT  [] OGT  [] PEG  [] Tube Feedings @     mL/hr     / URINE ASSESSMENT:                   [] Voiding    [] Oliguria  [x] Anuria                     []  Cline   [] Incontinent  []  Incontinent Brief   []  PureWick     PAIN:  [x] 0 []1  []2   []3   []4   []5   []6   []7   []8   []9   []10                MOBILITY:  [x] Bed    [] Stretcher      All Vitals and Treatment Details on Attached [address]: [facility]          Room # ER06/06    [] Routine         [] 1st Time Acute/Chronic   [x] Urgent      [] Stat            [x] Acute Room   []  Bedside    [] ICU/CCU     [] ER     Isolation Precautions:  [x] Dialysis    There are currently no Active Isolations     ALLERGIES:     Allergies   Allergen Reactions    Albumin, Human 25 % Itching     Headache - severe migraine like, itchy eyes, runny nose    Ciprofloxacin Hives    Cyclopentolate Unknown (comments)    Iron Sucrose Diarrhea    Statins-Hmg-Coa Reductase Inhibitors Other (comments)     Body ache        Code Status:  Full Code     Hepatitis Status      Lab Results   Component Value Date/Time    Hepatitis B surface Ag <0.10 11/17/2021 02:20 PM    Hepatitis B surface Ab 29.33 11/17/2021 02:20 PM        Current Labs:      Lab Results   Component Value Date/Time    WBC 5.9 08/18/2022 12:00 PM    Hemoglobin, POC 8.8 (L) 09/24/2020 08:45 AM    HGB 9.8 (L) 08/18/2022 12:00 PM    Hematocrit, POC 26 (L) 09/24/2020 08:45 AM    HCT 30.6 (L) 08/18/2022 12:00 PM    PLATELET 446 82/98/9801 12:00 PM    .7 (H) 08/18/2022 12:00 PM     Lab Results   Component Value Date/Time    Sodium 139 08/18/2022 12:00 PM    Potassium 6.2 (HH) 08/18/2022 12:00 PM    Chloride 108 08/18/2022 12:00 PM    CO2 19 (L) 08/18/2022 12:00 PM    Anion gap 12 08/18/2022 12:00 PM    Glucose 73 (L) 08/18/2022 12:00 PM    BUN 68 (H) 08/18/2022 12:00 PM    Creatinine 10.30 (H) 08/18/2022 12:00 PM    BUN/Creatinine ratio 7 (L) 08/18/2022 12:00 PM    GFR est AA 4 (L) 08/18/2022 12:00 PM    GFR est non-AA 4 (L) 08/18/2022 12:00 PM    Calcium 8.0 (L) 08/18/2022 12:00 PM          DIET:  DIET NPO     PRIMARY NURSE REPORT:   Pre Dialysis: NICOLE Morse RN    Time: 5272    EDUCATION:    [x] Patient           Knowledge Basis: []None []Minimal [x] Substantial [] Unknown  Barriers to learning  [x]None  [] Intubated/Trached/Ventilated  [] Sedated/Paralyzed   [x] Access Care     [] S&S of infection  [] Fluid Management  [x] K+   [] Procedural    [] Medications   [] Tx Options   [] Transplant   [] Diet      Teaching Tools:  [x] Explain  [] Demo  [] Handouts [] Video  Patient response: [x] Verbalized understanding   [] Requires follow up        [x] Time Out/Safety Check    [x] Extracorporeal Circuit Tested for integrity       RO/HEMODIALYSIS MACHINE SAFETY CHECKS - Before each treatment:        [facility]                                    [x] Unit Machine # 3 with centralized RO                                  [] Portable Machine #13/RO serial # [id number]                                  [] Portable Machine #2/RO serial # [id number]                                  [] Portable Machine #4/RO serial # [id number]                                  [] Portable Machine #10/RO serial # [id number]                                                                                                       Alarm Test:  Pass time 9013         [x] RO/Machine Log Complete    Machine Temp    36-37*C             Dialysate: pH  7.4    Conductivity: Meter 14.0    HD Machine  13.9    TCD: 13.7  Dialyzer Lot # [id number]    Blood Tubing Lot # [id number]    Saline Lot #[id number]     CHLORINE TESTING-Before each treatment and every 4 hours    Total Chlorine: [x] less than 0.1 ppm  Initial Time Check: 1200     4 Hr/2nd Check Time: 1600   (if greater than 0.1 ppm from Primary then every 30 minutes from Secondary)     TREATMENT INITIATION - with Dialysis Precautions:   [x] All Connections Secured              [x] Saline Line Double Clamped   [x] Venous Parameters Set               [x] Arterial Parameters Set    [x] Prime Given 250ml NSS              [x]Air Foam Detector Engaged          Treatment Initiation Note:  See above note    During Treatment Notes:    2401  Face & Vascular access visible with art and nichelle line connections intact. Pt tolerating dialysis. 1400  Face & Vascular access visible with art and nichelle line connections intact. Pt tolerating dialysis. 1415 Face & Vascular access visible with art and inchelle line connections intact. Pt tolerating dialysis. 1430 Face & Vascular access visible with art and nichelle line connections intact. Pt tolerating dialysis. 1445  Face & Vascular access visible with art and nichelle line connections intact. Pt tolerating dialysis. 1500Face & Vascular access visible with art and nichelle line connections intact. Pt tolerating dialysis. 1515 Face & Vascular access visible with art and nichelle line connections intact. Pt tolerating dialysis. 1530 Face & Vascular access visible with art and nichelle line connections intact.  Pt tolerating dialysis. 1545  Face & Vascular access visible with art and nichelle line connections intact. Pt tolerating dialysis. 1600  Face & Vascular access visible with art and nichelle line connections intact. Pt tolerating dialysis. 1615 Face & Vascular access visible with art and nichelle line connections intact. Pt tolerating dialysis. 1630 Face & Vascular access visible with art and nichelle line connections intact. Pt tolerating dialysis. 1639  Dialysis treatment complete. Medication    Dose    Volume Route      Time       Dat Nurse   Midodrine 5mg  HD  Chriss Simpson RN      HD         HD                  Post Assessment  Dialyzer Cleared:   [] Good  [x] Fair  [] Poor  Blood processed:  59 L  UF Removed:  2000 Ml    Post BP: 113/50  Pulse: 75  Respirations: 18   Temp: 98.2  [x] Temporal  [] Ax  [] Esophageal   Lungs: [] Clear                [x] No change from initial assessment   Post Tx Vascular Access: [] N/A  AVF/AVG: Bleeding stopped with  Arterial Pressure for 5 min   Venous Pressure for 5 min      Cardiac:  [x] Regular   [] Irregular   Rhythm:  [] Monitored   [] Not Monitored    CVC Catheter: [x] N/A  Locking solution: Heparin 1:1000 U  Arterial port   ml   Venous port   ml   Edema:  [x] None  [] Generalized                     Skin:[x] Warm  [x] Dry [] Diaphoretic               [] Flushed  [] Pale [] Cyanotic Pain:  [x]0  []1-2  []3-4  []5-6   []7-8  []9-10           Post Treatment Note:   See above note             POST TREATMENT PRIMARY NURSE HANDOFF REPORT:   Post Dialysis: NICOLE Jacques RN        Time:  7585         Abbreviations: AVG-arterial venous graft, AVF-arterial venous fistula, IJ-Internal Jugular, Subcl-Subclavian, Fem-Femoral, Tx-treatment, AP/HR-apical heart rate, VSS- Vital Signs Stable, CVC- Central Venous Catheter, DFR-dialysate flow rate, BFR-blood flow rate, AP-arterial pressure, -venous pressure, UF-ultrafiltrate, TMP-transmembrane pressure, Nichelle-Venous, Art-Arterial, RO-Reverse Osmosis

## 2022-08-18 NOTE — ED NOTES
Patient states two hours into dialysis on Tuesday her fistula \"blew\" came to clinic today for labs and evaluation. States labs were \"off\" and sent to ED for dialysis. Patient denies all symptoms. Noted discoloration to left upper arm.

## 2022-08-18 NOTE — OP NOTES
OPERATIVE NOTE    DATE OF PROCEDURE: 8/18/22    SURGEON: Magda Yañez MD    ASSISTANT(S): None    PREOPERATIVE DIAGNOSIS: Malfunctioning left AVF   POSTOPERATIVE DIAGNOSIS: same    PROCEDURE PERFORMED: Left arm fistulogram, US guided left arm access, balloon angioplasty of mid basilic vein    ANESTHESIA: Moderate Sedation    EBL: Minimal    FLUIDS: None    BLOOD ADMINISTERED: None    DRAINS/TUBES: None    IMPLANTS: None    COMPLICATIONS: None    FINDINGS: Valve identified at mid basilic vein but no flow limiting stenosis identified    OPERATIVE INDICATIONS: Elevated venous pressures    DESCRIPTION OF PROCEDURE:      The patient was brought to the cath lab after the risks and the benefits of the procedure were explained to the patient and DNR was suspended for the duration of the procedure. Informed consent was obtained. Left arm was prepped and draped in the usual sterile manner. Time out was performed. Procedure began by injecting 1cc of lidocaine just distal to the brachial anastamosis. US guided micropuncture access was obtained of the fistula, microsheath was placed and an angiogram through the microsheath was performed which did not show any flow limiting stenosis. Next a reid wire was advanced without resistance and a 6fr sheath was placed and flushed. Next a 6mm balloon angioplasty was performed to identify if the valve was frozen which it was not and no stenosis was identified. Next I removed the wire and placed a 3-0 monocryl stitch around the access and sheath was removed without bleeding and hemostasis was obtained. Pressure was held for 10 minutes and patient tolerated the procedure well.      Magda Yañez MD

## 2022-08-18 NOTE — PROGRESS NOTES
INTERVENTION:  HEMODYNAMIC STABILIZATION  MAINTAIN BP WNL WHILE ON HD. INTERVENTION:  FLUID MANAGEMENT  WILL ATTEMPT 2500 ML TOTAL FLUID REMOVAL AS TOLERATED. INTERVENTION:  METABOLIC/ELECTROLYTE MANAGEMENT  2.0 POTASSIUM 2.5 CALCIUM DIALYSATE USED WITH HD TODAY. INTERVENTION:  HEMODIALYSIS ACCESS SITE MANAGEMENT  LEFT UPPER ARM AVF ACCESSED WITH 15G NEEDLE USING ASEPTIC TECHNIQUE. GOAL:  SIGNS AND SYMPTOMS OF LISTED POTENTIAL PROBLEMS WILL BE ABSENT OR MANAGEABLE. OUTCOME:  PROGRESSING. HD PLANNED FOR 3 HOURS TODAY.

## 2022-08-18 NOTE — Clinical Note
TRANSFER - IN REPORT:     Verbal report received from: JOSE ANTONIO Carrasquillo. Report consisted of patient's Situation, Background, Assessment and   Recommendations(SBAR). Opportunity for questions and clarification was provided. Assessment completed upon patient's arrival to unit and care assumed. Patient transported with a Cardiac Cath Tech / Patient Care Tech.

## 2022-08-18 NOTE — H&P
Vascular Surgery      Patient: Fozia Ro MRN: [id number]  CSN: [id number]      YOB: 1945    Age: 64 y.o. Sex: female      DOA: 8/18/2022       HPI:     Fozia Ro is a 64 y.o. female who presents today for left upper extremity fistulogram with possible intervention for increased bleeding post cannulation and elevated venous pressures. Patient has a good thrill in the fistula proximally which becomes pulsatile towards the upper arm. Patient seems to have a left arm basilic vein transposition based on the scar as no op notes of the initial procedure are available. Plan for left arm fistulogram today    Past Medical History:   Diagnosis Date    Anemia in end-stage renal disease ([facility] [address]     Anticoagulated by anticoagulation treatment     On Apixaban    Chronic hypotension     On Midodrine    Chronic pain     Closed fracture of left inferior pubic ramus, with routine healing, subsequent encounter 11/12/2021    Closed fracture of superior pubic ramus, left, with routine healing, subsequent encounter 11/12/2021    Closed nondisplaced fracture of anterior wall of left acetabulum with routine healing 11/12/2021    Depression     End-stage renal disease on hemodialysis ([facility] [address]     HD at 39 [address]  [facility]  Tel # [phone]    Gastroesophageal reflux disease     Glaucoma     History of acute pyelonephritis 2/20/2020    History of hydronephrosis 10/5/2021    History of infection with vancomycin resistant Enterococcus (VRE) 10/8/2021    Urine culture (collected 10/8/2021, resulted 10/14/2021) yielded growth of >100,000 colonies/ml of Enterococcus faecalis RESISTANT to Ciprofloxacin, Levofloxacin, Tetracycline and Vancomycin    History of kidney stones     History of recurrent urinary tract infection     History of sepsis 6/18/2021    History of septic shock 10/8/2021    History of urethral stricture     History of urinary tract infection 10/8/2021    Urine culture (collected 10/8/2021, resulted 10/14/2021) yielded growth of >100,000 colonies/ml of Enterococcus faecalis RESISTANT to Ciprofloxacin, Levofloxacin, Tetracycline and Vancomycin    Hyperlipidemia     Hyperphosphatemia 11/14/2021    Hypothyroidism     Lung mass     Mononeuropathy     Involving ring finger of left hand    Need for prophylactic isolation 10/8/2021    Urine culture (collected 10/8/2021, resulted 10/14/2021) yielded growth of >100,000 colonies/ml of Enterococcus faecalis RESISTANT to Ciprofloxacin, Levofloxacin, Tetracycline and Vancomycin    Paroxysmal atrial fibrillation (HCC)     Secondary hyperparathyroidism of renal origin (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 [address]     HbA1c (10/8/2021) = 4.6    Uric acid nephrolithiasis     Urinary incontinence        Past Surgical History:   Procedure Laterality Date    HX APPENDECTOMY      HX CHOLECYSTECTOMY      HX GASTRIC BYPASS      Gastric stapling    HX KNEE ARTHROSCOPY      HX UROLOGICAL      right PCN placement    HX UROLOGICAL  07/23/2018    RIGHT URETEROSCOPY WITH HOLMIUM LASER    IR EXCHANGE NEPHRO PERC LT SI  2/21/2020    IR EXCHANGE NEPHRO PERC RT SI  4/13/2020    IR EXCHANGE NEPHRO PERC RT SI  7/17/2020    IR NEPHROSTOMY PERC RT PLC CATH  SI  10/14/2020    IR NEPHROURETERAL PERC RT PLC CATH NEW ACCESS  SI  4/30/2020    TN INTRO CATH DIALYSIS CIRCUIT DX ANGRPH FLUOR S&I Left 9/24/2020    FISTULOGRAM LEFT/poss permanent catheter placement performed by Myah Byrd MD at [facility]    VASCULAR SURGERY PROCEDURE UNLIST      lef AVF       Family History   Problem Relation Age of Onset    Heart Surgery Sister        Social History     Socioeconomic History    Marital status: SINGLE   Tobacco Use    Smoking status: Never    Smokeless tobacco: Never   Vaping Use    Vaping Use: Never used   Substance and Sexual Activity    Alcohol use: Never    Drug use: Never       Prior to Admission medications    Medication Sig Start Date End Date Taking? Authorizing Provider   gabapentin (NEURONTIN) 100 mg capsule Take 1 Capsule by mouth nightly. Max Daily Amount: 100 mg. Indications: concern for back pain post dialysis 5/27/22  Yes Raeann Hdz MD   melatonin 5 mg tablet Take 5 mg by mouth nightly. Yes Provider, Historical   allopurinoL (ZYLOPRIM) 100 mg tablet Take 1 Tablet by mouth every Monday, Wednesday, Friday. [after hemodialysis]  Indications: treatment to prevent acute gout attack 12/3/21  Yes Danyel To MD   amiodarone (CORDARONE) 200 mg tablet Take 1 Tablet by mouth daily. Indications: prevention of recurrent atrial fibrillation 12/3/21  Yes Danyel To MD   sevelamer carbonate (RENVELA) 800 mg tab tab Take 2 Tablets by mouth three (3) times daily (with meals). Indications: renal osteodystrophy with hyperphosphatemia 12/3/21  Yes Danyel To MD   methoxy peg-epoetin beta [facility] INJECTION) 30 mcg by IntraVENous route. in HD 9/20/21 9/19/22 Yes Provider, Historical   DULoxetine (CYMBALTA) 20 mg capsule Take 20 mg by mouth daily. Yes Provider, Historical   estradioL (Estrace) 0.01 % (0.1 mg/gram) vaginal cream Apply a fingertip amount around the urethra three times a week. 9/30/20  Yes Rubina Vazquez MD   biotin 1,000 mcg chew Take 1 Tab by mouth daily. Yes Provider, Historical   cyanocobalamin 1,000 mcg tablet Take 1,000 mcg by mouth daily. Yes Provider, Historical   lactobacillus sp. 50 billion cpu (BIO-K PLUS) 50 billion cell -375 mg cap capsule Take 1 Cap by mouth daily. 2/25/20  Yes Ghanshyam Cortez MD   ascorbic acid, vitamin C, (VITAMIN C) 500 mg tablet Take 500 mg by mouth daily. Yes Other, MD Lexus   cholecalciferol (VITAMIN D3) (2,000 UNITS /50 MCG) cap capsule Take 2,000 Units by mouth two (2) times a day. Yes Other, MD Lexus   latanoprost (XALATAN) 0.005 % ophthalmic solution Administer 1 Drop to both eyes nightly.  One drop at bedtime   Yes Other, MD Lexus   levothyroxine (SYNTHROID) 125 mcg tablet Take 125 mcg by mouth Daily (before breakfast). Yes Samira, MD Lex[facility]   omeprazole (PRILOSEC) 20 mg capsule Take 20 mg by mouth daily. Yes Samira, MD Lex[facility]   vit B Cmplx 3-FA-Vit C-Biotin (NEPHRO HOWIE RX) 1- mg-mg-mcg tablet Take 1 Tab by mouth daily. Yes Samira, MD Lexus   clopidogreL (Plavix) 75 mg tab Take 1 Tablet by mouth in the morning. 8/15/22   Luisana Avalos MD   HYDROmorphone (DILAUDID) 2 mg tablet Take 1 mg by mouth every eight (8) hours as needed for Pain. Take 1/2 tablet by mouth every 8 hours as needed for pain level of 5 out of 10 or greater  Patient not taking: Reported on 8/18/2022    Provider, Historical   acetaminophen (Tylenol Extra Strength) 500 mg tablet Take 1 Tablet by mouth every six (6) hours as needed for Pain. Patient not taking: Reported on 8/18/2022 11/7/21   Regine Drake MD   lidocaine (LIDODERM) 5 % Apply patch to the affected area for 12 hours a day and remove for 12 hours a day. Patient not taking: Reported on 8/18/2022 11/7/21   Regine Drake MD   meclizine (ANTIVERT) 25 mg tablet Take 25 mg by mouth three (3) times daily as needed for Dizziness. Patient not taking: Reported on 8/18/2022 3/3/21   Provider, Historical   ondansetron (ZOFRAN ODT) 4 mg disintegrating tablet Take 4 mg by mouth every eight (8) hours as needed for Nausea or Vomiting.   Patient not taking: Reported on 8/18/2022    Other, MD Lexus       Allergies   Allergen Reactions    Albumin, Human 25 % Itching     Headache - severe migraine like, itchy eyes, runny nose    Ciprofloxacin Hives    Cyclopentolate Unknown (comments)    Iron Sucrose Diarrhea    Statins-Hmg-Coa Reductase Inhibitors Other (comments)     Body ache       Review of Systems  Review of Systems - Negative except HPI      Physical Exam:      Visit Vitals  BP (!) 117/46 (BP 1 Location: Right upper arm, BP Patient Position: Semi fowlers)   Pulse (!) 48   Resp 18   Ht 5' 2\" (1.575 m)   Wt 194 lb 0.1 oz (88 kg)   SpO2 96%   Breastfeeding No   BMI 35.48 kg/m²       Physical Exam:  Pertinent items are noted in HPI. Data Review:  CBC:   Lab Results   Component Value Date/Time    WBC 9.8 08/09/2022 03:22 PM    RBC 3.91 (L) 08/09/2022 03:22 PM    HGB 12.6 08/09/2022 03:22 PM    HCT 40.2 08/09/2022 03:22 PM    PLATELET 337 57/17/7868 03:22 PM      BMP:   Lab Results   Component Value Date/Time    Glucose 109 (H) 08/09/2022 03:22 PM    Sodium 142 08/09/2022 03:22 PM    Potassium 4.9 08/09/2022 03:22 PM    Chloride 102 08/09/2022 03:22 PM    CO2 28 08/09/2022 03:22 PM    BUN 43 (H) 08/09/2022 03:22 PM    Creatinine 7.22 (H) 08/09/2022 03:22 PM    Calcium 9.6 08/09/2022 03:22 PM         Assessment/Plan     68yo F here for left arm fistulogram for increased venous pressures and increased bleeding post cannulation    - Labs reviewed  - Procedure risks and benefits discussed  - Patient has DNR which after a discussed has been suspended for the duration of the perioperative phase    Active Problems:    * No active hospital problems.  Brittany Curry MD  August 18, 2022

## 2022-08-18 NOTE — Clinical Note
Sheath #1: sheath exchanged for [facility] W/GDWIRE 6FRX5.5CM -- BRITE TIP - ORDER AS EA.

## 2022-08-18 NOTE — Clinical Note
Left radial and left brachial prepped with ChloraPrep and draped. Wet prep elapsed drying time: 3 mins.

## 2022-08-19 LAB
ATRIAL RATE: 63 BPM
CALCULATED R AXIS, ECG10: -15 DEGREES
CALCULATED T AXIS, ECG11: 55 DEGREES
DIAGNOSIS, 93000: NORMAL
HBV CORE AB SERPL QL IA: NEGATIVE
HBV SURFACE AB SER QL IA: POSITIVE
HBV SURFACE AB SERPL IA-ACNC: 10.65 MIU/ML
HEP BS AB COMMENT,HBSAC: NORMAL
Q-T INTERVAL, ECG07: 492 MS
QRS DURATION, ECG06: 102 MS
QTC CALCULATION (BEZET), ECG08: 435 MS
VENTRICULAR RATE, ECG03: 47 BPM

## 2022-08-30 ENCOUNTER — OFFICE VISIT (OUTPATIENT)
Dept: ORTHOPEDIC SURGERY | Age: 77
End: 2022-08-30
Payer: MEDICARE

## 2022-08-30 VITALS — BODY MASS INDEX: 37.28 KG/M2 | HEIGHT: 62 IN | WEIGHT: 202.6 LBS

## 2022-08-30 DIAGNOSIS — S30.0XXA CONTUSION OF BUTTOCK, INITIAL ENCOUNTER: ICD-10-CM

## 2022-08-30 DIAGNOSIS — M70.71 ISCHIAL BURSITIS OF RIGHT SIDE: ICD-10-CM
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
## 2022-08-30 DIAGNOSIS — M25.551 RIGHT HIP PAIN: Primary | ICD-10-CM

## 2022-08-30 DIAGNOSIS — M16.0 ARTHRITIS OF BOTH HIPS: ICD-10-CM

## 2022-08-30 DIAGNOSIS — Z87.81 HISTORY OF PELVIC FRACTURE: ICD-10-CM

## 2022-08-30 PROCEDURE — G8536 NO DOC ELDER MAL SCRN: HCPCS | Performed by: PHYSICIAN ASSISTANT

## 2022-08-30 PROCEDURE — G8399 PT W/DXA RESULTS DOCUMENT: HCPCS | Performed by: PHYSICIAN ASSISTANT

## 2022-08-30 PROCEDURE — 1123F ACP DISCUSS/DSCN MKR DOCD: CPT | Performed by: PHYSICIAN ASSISTANT

## 2022-08-30 PROCEDURE — 73502 X-RAY EXAM HIP UNI 2-3 VIEWS: CPT | Performed by: PHYSICIAN ASSISTANT

## 2022-08-30 PROCEDURE — G9717 DOC PT DX DEP/BP F/U NT REQ: HCPCS | Performed by: PHYSICIAN ASSISTANT

## 2022-08-30 PROCEDURE — 1101F PT FALLS ASSESS-DOCD LE1/YR: CPT | Performed by: PHYSICIAN ASSISTANT

## 2022-08-30 PROCEDURE — 99214 OFFICE O/P EST MOD 30 MIN: CPT | Performed by: PHYSICIAN ASSISTANT

## 2022-08-30 PROCEDURE — G8417 CALC BMI ABV UP PARAM F/U: HCPCS | Performed by: PHYSICIAN ASSISTANT

## 2022-08-30 PROCEDURE — 1090F PRES/ABSN URINE INCON ASSESS: CPT | Performed by: PHYSICIAN ASSISTANT

## 2022-08-30 PROCEDURE — G8427 DOCREV CUR MEDS BY ELIG CLIN: HCPCS | Performed by: PHYSICIAN ASSISTANT

## 2022-08-30 NOTE — PROGRESS NOTES
Patient: Sharlene Bailon                MRN: [id number]       SSN: [id number]  YOB: 1945        AGE: 64 y.o. SEX: female          PCP: José Manuel Diaz MD  08/30/22    Chief Complaint   Patient presents with    Hip Pain     RIGHT         HISTORY:  Sharlene Bailon is a 64 y.o. female presents to the office status post fall at home on 8/18/2022. Patient has a history of falls she is imbalanced by report. She does use a 4 post rolling walker for ambulation assistance. Earlier in the year she had a fall that resulted in a left pubic rami superior and inferior fracture that was nonoperative. Patient reports that she had pain in the right buttocks after falling that took now almost 2 weeks to improve however she became worried over the past several days that the pain should have been improving faster. Patient denies any bowel or bladder incontinence. There was no loss of consciousness at the time or just following the fall. Pain is dull achy and has limited her ability to sit comfortably on the toilet as well as in a chair. She is a renal failure patient and does participate in outpatient hemodialysis 3 times weekly. Sitting for the dialysis for 4 hours is extremely uncomfortable. Tylenol and Motrin have been minimally successful for symptom management.       Pain Assessment  8/30/2022   Location of Pain Hip   Location Modifiers Right   Severity of Pain 8   Quality of Pain Sharp   Duration of Pain Persistent   Frequency of Pain Constant   Date Pain First Started (No Data)   Date Pain First Started Comment TWO WEEKS AGO   Aggravating Factors Other (Comment)   Aggravating Factors Comment SITTING OR LAYING ON BELLY   Limiting Behavior Yes   Relieving Factors Nothing   Relieving Factors Comment -   Result of Injury Yes   Work-Related Injury No   Type of Injury Fall           Lab Results   Component Value Date/Time    Hemoglobin A1c 4.3 04/12/2022 12:46 PM Weight Metrics 8/30/2022 8/18/2022 8/15/2022 7/5/2022 6/28/2022 6/27/2022 6/8/2022   Weight 202 lb 9.6 oz 194 lb 0.1 oz 194 lb 0.1 oz 206 lb 200 lb - 212 lb   BMI 37.06 kg/m2 35.48 kg/m2 35.48 kg/m2 37.68 kg/m2 - 36.58 kg/m2 38.78 kg/m2            Problem List Items Addressed This Visit    None  Visit Diagnoses       Right hip pain    -  Primary    Relevant Orders    AMB POC X-RAY RADEX HIP UNI WITH PELVIS 2-3 VIEWS (Completed)    Fall, initial encounter        Ischial bursitis of right side        Contusion of buttock, initial encounter        History of pelvic fracture        Arthritis of both hips                PAST MEDICAL HISTORY:   Past Medical History:   Diagnosis Date    Anemia in end-stage renal disease ([facility] [address]     Anticoagulated by anticoagulation treatment     On Apixaban    Chronic hypotension     On Midodrine    Chronic pain     Closed fracture of left inferior pubic ramus, with routine healing, subsequent encounter 11/12/2021    Closed fracture of superior pubic ramus, left, with routine healing, subsequent encounter 11/12/2021    Closed nondisplaced fracture of anterior wall of left acetabulum with routine healing 11/12/2021    Depression     End-stage renal disease on hemodialysis ([facility] [address]     HD at [facility] on [facility] on [address].  Tel # [phone]    Gastroesophageal reflux disease     Glaucoma     History of acute pyelonephritis 02/20/2020    History of hydronephrosis 10/05/2021    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    Urine culture (collected 10/8/2021, resulted 10/14/2021) yielded growth of >100,000 colonies/ml of Enterococcus faecalis RESISTANT to Ciprofloxacin, Levofloxacin, Tetracycline and Vancomycin    History of kidney stones     History of recurrent urinary tract infection     History of sepsis 06/18/2021    History of septic shock 10/08/2021    History of urethral stricture     History of urinary tract infection 10/08/2021    Urine culture (collected 10/8/2021, resulted 10/14/2021) yielded growth of >100,000 colonies/ml of Enterococcus faecalis RESISTANT to Ciprofloxacin, Levofloxacin, Tetracycline and Vancomycin    Hyperlipidemia     Hyperphosphatemia 11/14/2021    Hypothyroidism     Lung mass     Mononeuropathy     Involving ring finger of left hand    Need for prophylactic isolation 10/08/2021    Urine culture (collected 10/8/2021, resulted 10/14/2021) yielded growth of >100,000 colonies/ml of Enterococcus faecalis RESISTANT to Ciprofloxacin, Levofloxacin, Tetracycline and Vancomycin    Osteoporosis     Paroxysmal atrial fibrillation (HCC)     Secondary hyperparathyroidism of renal origin ([facility] [address]     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 ([facility] [address]     HbA1c (10/8/2021) = 4.6    Uric acid nephrolithiasis     Urinary incontinence        PAST SURGICAL HISTORY:   Past Surgical History:   Procedure Laterality Date    HX APPENDECTOMY      HX CHOLECYSTECTOMY      HX GASTRIC BYPASS      Gastric stapling    HX KNEE ARTHROSCOPY      HX UROLOGICAL      right PCN placement    HX UROLOGICAL  07/23/2018    RIGHT URETEROSCOPY WITH HOLMIUM LASER    IR EXCHANGE NEPHRO PERC LT SI  2/21/2020    IR EXCHANGE NEPHRO PERC RT SI  4/13/2020    IR EXCHANGE NEPHRO PERC RT SI  7/17/2020    IR NEPHROSTOMY PERC RT PLC CATH  SI  10/14/2020    IR NEPHROURETERAL PERC RT PLC CATH NEW ACCESS  SI  4/30/2020    SD INTRO CATH DIALYSIS CIRCUIT DX ANGRPH FLUOR S&I Left 9/24/2020    FISTULOGRAM LEFT/poss permanent catheter placement performed by Triny Cortez MD at [facility] CATH LAB    VASCULAR SURGERY PROCEDURE UNLIST      lef AVF       ALLERGIES:   Allergies   Allergen Reactions    Albumin, Human 25 % Itching     Headache - severe migraine like, itchy eyes, runny nose    Ciprofloxacin Hives    Cyclopentolate Unknown (comments)    Iron Sucrose Diarrhea    Statins-Hmg-Coa Reductase Inhibitors Other (comments)     Body ache        CURRENT MEDICATIONS:  A list of medications prior to the time of admission include:  Prior to Admission medications    Medication Sig Start Date End Date Taking? Authorizing Provider   aspirin delayed-release 81 mg tablet Take 1 Tablet by mouth daily. 8/18/22  Yes Mary Kate Beltrán MD   clopidogreL (Plavix) 75 mg tab Take 1 Tablet by mouth in the morning. 8/15/22  Yes Mary Kate Beltrán MD   gabapentin (NEURONTIN) 100 mg capsule Take 1 Capsule by mouth nightly. Max Daily Amount: 100 mg. Indications: concern for back pain post dialysis 5/27/22  Yes Nicolette De La Rosa MD   melatonin 5 mg tablet Take 5 mg by mouth nightly. Yes Provider, Historical   allopurinoL (ZYLOPRIM) 100 mg tablet Take 1 Tablet by mouth every Monday, Wednesday, Friday. [after hemodialysis]  Indications: treatment to prevent acute gout attack 12/3/21  Yes Chyna Kennedy MD   amiodarone (CORDARONE) 200 mg tablet Take 1 Tablet by mouth daily. Indications: prevention of recurrent atrial fibrillation 12/3/21  Yes Chyna Kennedy MD   sevelamer carbonate (RENVELA) 800 mg tab tab Take 2 Tablets by mouth three (3) times daily (with meals). Indications: renal osteodystrophy with hyperphosphatemia 12/3/21  Yes Chyna Kennedy MD   acetaminophen (Tylenol Extra Strength) 500 mg tablet Take 1 Tablet by mouth every six (6) hours as needed for Pain. 11/7/21  Yes Violeta Murray MD   DULoxetine (CYMBALTA) 20 mg capsule Take 20 mg by mouth daily. Yes Provider, Historical   biotin 1,000 mcg chew Take 1 Tab by mouth daily. Yes Provider, Historical   lactobacillus sp. 50 billion cpu (BIO-K PLUS) 50 billion cell -375 mg cap capsule Take 1 Cap by mouth daily. 2/25/20  Yes Abbe Burgess MD   ascorbic acid, vitamin C, (VITAMIN C) 500 mg tablet Take 500 mg by mouth daily. Yes Other, MD Lexus   cholecalciferol (VITAMIN D3) (2,000 UNITS /50 MCG) cap capsule Take 2,000 Units by mouth two (2) times a day. Yes Other, MD Lexus   latanoprost (XALATAN) 0.005 % ophthalmic solution Administer 1 Drop to both eyes nightly. One drop at bedtime   Yes Other, MD Lexus   levothyroxine (SYNTHROID) 125 mcg tablet Take 125 mcg by mouth Daily (before breakfast). Yes Other, MD Lexus   omeprazole (PRILOSEC) 20 mg capsule Take 20 mg by mouth daily. Yes Other, MD Lexus   vit B Cmplx 3-FA-Vit C-Biotin (NEPHRO HOWIE RX) 1- mg-mg-mcg tablet Take 1 Tab by mouth daily. Yes Other, MD Lexus   HYDROmorphone (DILAUDID) 2 mg tablet Take 1 mg by mouth every eight (8) hours as needed for Pain. Take 1/2 tablet by mouth every 8 hours as needed for pain level of 5 out of 10 or greater  Patient not taking: No sig reported    Provider, Historical   lidocaine (LIDODERM) 5 % Apply patch to the affected area for 12 hours a day and remove for 12 hours a day. Patient not taking: Reported on 8/18/2022 11/7/21   Sharyon Apley, MD   meclizine (ANTIVERT) 25 mg tablet Take 25 mg by mouth three (3) times daily as needed for Dizziness. Patient not taking: Reported on 8/18/2022 3/3/21   Provider, Historical   methoxy peg-epoetin beta (MIRCERA INJECTION) 30 mcg by IntraVENous route. in HD  Patient not taking: Reported on 8/30/2022 9/20/21 9/19/22  Provider, Historical   estradioL (Estrace) 0.01 % (0.1 mg/gram) vaginal cream Apply a fingertip amount around the urethra three times a week. Patient not taking: Reported on 8/30/2022 9/30/20   Julia Young MD   cyanocobalamin 1,000 mcg tablet Take 1,000 mcg by mouth daily. Patient not taking: Reported on 8/30/2022    Provider, Historical   ondansetron (ZOFRAN ODT) 4 mg disintegrating tablet Take 4 mg by mouth every eight (8) hours as needed for Nausea or Vomiting.   Patient not taking: Reported on 8/18/2022    Other, MD Lexus       FAMILY HISTORY:   Family History   Problem Relation Age of Onset    Heart Surgery Sister        SOCIAL HISTORY:   Social History     Socioeconomic History    Marital status: SINGLE   Tobacco Use    Smoking status: Never    Smokeless tobacco: Never   Vaping Use    Vaping Use: Never used Substance and Sexual Activity    Alcohol use: Never    Drug use: Never       ROS:No CP, No SOB, No fever/chills nor night sweats. No headaches, vision abnormalities to include double and or loss of vision. No dizziness. No hearing abnormalities. No Chest Pain nor Shortness of breath. Pt denies h/o spinal surgery, injections, or PT/chiropractor. Patient has attempted self treatment with less than adequate relief on oral and topical analgesic / anti inflammatory medications . Pt denies change in bowel or bladder habits. No saddle paresthesia / anesthesia. Pt denies fever, unplanned weight loss / weight gains, and no skin changes. Musculoskeletal pain per HPI. Pain is exacerbated positionally. PHYSICAL EXAM:    Visit Vitals  Ht 5' 2\" (1.575 m)   Wt 202 lb 9.6 oz (91.9 kg)   BMI 37.06 kg/m²       Constitutional: Appears well-developed and well-nourished. No distress. Sitting comfortably in the exam room, interacting with conversation with pleasant affect. Gait appears steady and patient exhibits no evidence of ataxia. Patient is able to ambulate with caution using a 4 post rolling walker. No focal neurological deficit noted. No facial droop, slurred speech, or evidence of altered mentation noted on exam.   Skin: Skin over the head, neck, bilateral limbs, and trunk is warm and dry. No rash or erythema noted. Cranial Nerves II-XII grossly intact  HENT: NC/AT. Normal symmetry, bulk and tone of facial and neck musculature. Trachea midline. No discernible thyromegaly or masses. No involuntary movements. Lymphatic: No preauricular, submandibuar, anterior or posterior cervical lymphadenopathy. Psychiatric: The patient is awake, alert, and oriented to person, place and time. Behavior is normal. Thought content normal.   Cardiovascular: No clubbing, cyanosis. No edema bilateral lower extremities. Pulmonary: No tripoding nor accessory muscle recruitment. Breathing normally, no distress, no audible wheezing. Distal cap refill intact at 2/2 Tree UE / LE. Neuro intact Tree UE/LE to noxious stimuli        Ortho Specific exam:    Right hip skin intact no warmth, erythema, edema, or ecchymosis. Sitting at bedside right knee flexed at 90 degrees passive internal or external rotation noted at 12 and 15 degrees with slight discomfort reproduced in the right groin. Hip flexor strength 4+/5 on the right without pain or guarding. Quad and femoral nerve activity full right lower extremity. Patient standing at bedside easily resting her hands on the table and I am joined by a male relative the right ischial area was gently palpated to elicit pain with no crepitus or palpable defects noted. Distal sensation intact bilateral lower extremities. There is 1+ edema of both lower extremities to include the dorsum of the feet with no evidence of cellulitis or skin breakdown[facility] pelvis and a crosstable lateral right hip reveals moderate degenerative changes seen in the femoral acetabular joint space. Spurring is seen in the superior and inferior rim of the acetabulum. There is calcification noted to the vasculature in the descending iliac symmetrically. Also patient has no lytic or blastic lesions. Healing is noted in the AP imaging in the inferior and superior pubic rami fractures previously identified. There is no acute fracture deformities noted today. IMPRESSION:      ICD-10-CM ICD-9-CM    1. Right hip pain  M25.551 719.45 AMB POC X-RAY RADEX HIP UNI WITH PELVIS 2-3 VIEWS      2. Fall, initial encounter  [address] XXXA E888.9       3. Ischial bursitis of right side  M70.71 726.5       4. Contusion of buttock, initial encounter  S30. 0XXA 922.32       5. History of pelvic fracture  Z87.81 V15.51       6.  Arthritis of both hips  M16.0 716.95            PLAN: Today we discussed alternatives care to include but not limited to conservative measures to include over-the-counter Tylenol/Motrin for symptom management per 's recommended dosing. Additionally I recommended a doughnut inflatable versus a silicone seat pad to sit on particular when she is at dialysis but to take with her to use at home as well. Patient will follow back on a as needed basis today all of her questions answered to her satisfaction a copy of her x-rays reviewed and provided. Additionally today we discussed the diagnosis of obesity and the importance of weight management for both cardiovascular health. The patient was recommended to decrease carbohydrate and sugar intake. Patient recommended a formal dietary consult which they will consider and return a call to our office. In light of the patient's osteoarthritic findings I am making a recommendation for aerobic exercise to include but not limited to stationary bicycle, elliptical, therapeutic walking with good shoes and or swimming. Patient should avoid any running or jumping. If using the treadmill then recommendation for no elevation and no running or jogging. Care plan outlined and precautions discussed. Results were reviewed with the patient. All medications were reviewed with the patient. All of pt's questions and concerns were addressed. Alarm symptoms and return precautions associated with chief complaint and evaluation were reviewed with the patient in detail. The patient demonstrated adequate understanding. The patient expresses willing compliance with the treatment plan. Special note: Medication management discussed in detail all patient's questions answered to their satisfaction. No Narcotic indicated today. Patient given pain medication for short term acute pain relief. Goal is to treat patient according to above plan and to ultimately have patient off all pain medications once appropriate.  If chronic pain management is required beyond what is expected for current orthopedic problem, will refer patient to pain management.  was reviewed and will be reviewed with every medication refill request.         Patient provided a reminder for a \"due or due soon\" health maintenance. I have asked the patient to schedule an appointment with their primary care provider for follow-up on general health maintenance concerns. Today all the patient's questions were answered to their satisfaction. Copies of x-rays reviewed if obtained this visit, and provided to patient. Dictation disclaimer:  Please note that this dictation was completed with [employer], the computer voice recognition software. Quite often unanticipated grammatical, syntax, homophones, and other interpretive errors are inadvertently transcribed by the computer software. Please disregard these errors. Please excuse any errors that have escaped final proofreading. J Carlos GRIGGS, APC, MPAS, PA-C  [facility]

## 2022-10-19 ENCOUNTER — HOSPITAL ENCOUNTER (EMERGENCY)
Age: 77
Discharge: HOME | End: 2022-10-19
Attending: EMERGENCY MEDICINE
Payer: MEDICARE

## 2022-10-19 ENCOUNTER — APPOINTMENT (OUTPATIENT)
Dept: GENERAL RADIOLOGY | Age: 77
End: 2022-10-19
Attending: EMERGENCY MEDICINE
Payer: MEDICARE

## 2022-10-19 VITALS
SYSTOLIC BLOOD PRESSURE: 125 MMHG | HEART RATE: 97 BPM | HEIGHT: 62 IN | RESPIRATION RATE: 16 BRPM | BODY MASS INDEX: 37.17 KG/M2 | DIASTOLIC BLOOD PRESSURE: 49 MMHG | TEMPERATURE: 97 F | WEIGHT: 202 LBS

## 2022-10-19 DIAGNOSIS — I48.0 PAROXYSMAL ATRIAL FIBRILLATION (HCC): ICD-10-CM

## 2022-10-19 DIAGNOSIS — R07.9 CHEST PAIN, UNSPECIFIED TYPE: Primary | ICD-10-CM

## 2022-10-19 LAB
ALBUMIN SERPL-MCNC: 3.6 G/DL (ref 3.4–5)
ALBUMIN/GLOB SERPL: 0.9 {RATIO} (ref 0.8–1.7)
ALP SERPL-CCNC: 113 U/L (ref 45–117)
ALT SERPL-CCNC: 14 U/L (ref 13–56)
ANION GAP SERPL CALC-SCNC: 9 MMOL/L (ref 3–18)
AST SERPL-CCNC: 15 U/L (ref 10–38)
ATRIAL RATE: 241 BPM
BASOPHILS # BLD: 0.1 K/UL (ref 0–0.1)
BASOPHILS NFR BLD: 1 % (ref 0–2)
BILIRUB SERPL-MCNC: 0.5 MG/DL (ref 0.2–1)
BNP SERPL-MCNC: 8779 PG/ML (ref 0–1800)
BUN SERPL-MCNC: 14 MG/DL (ref 7–18)
BUN/CREAT SERPL: 4 (ref 12–20)
CALCIUM SERPL-MCNC: 9.6 MG/DL (ref 8.5–10.1)
CALCULATED R AXIS, ECG10: -28 DEGREES
CALCULATED T AXIS, ECG11: -3 DEGREES
CHLORIDE SERPL-SCNC: 99 MMOL/L (ref 100–111)
CO2 SERPL-SCNC: 30 MMOL/L (ref 21–32)
CREAT SERPL-MCNC: 3.96 MG/DL (ref 0.6–1.3)
DIAGNOSIS, 93000: NORMAL
DIFFERENTIAL METHOD BLD: ABNORMAL
EOSINOPHIL # BLD: 0.1 K/UL (ref 0–0.4)
EOSINOPHIL NFR BLD: 2 % (ref 0–5)
ERYTHROCYTE [DISTWIDTH] IN BLOOD BY AUTOMATED COUNT: 14.6 % (ref 11.6–14.5)
GLOBULIN SER CALC-MCNC: 4.2 G/DL (ref 2–4)
GLUCOSE SERPL-MCNC: 104 MG/DL (ref 74–99)
HCT VFR BLD AUTO: 31 % (ref 35–45)
HGB BLD-MCNC: 9.9 G/DL (ref 12–16)
IMM GRANULOCYTES # BLD AUTO: 0.1 K/UL (ref 0–0.04)
IMM GRANULOCYTES NFR BLD AUTO: 2 % (ref 0–0.5)
LYMPHOCYTES # BLD: 1.8 K/UL (ref 0.9–3.6)
LYMPHOCYTES NFR BLD: 31 % (ref 21–52)
MCH RBC QN AUTO: 33.1 PG (ref 24–34)
MCHC RBC AUTO-ENTMCNC: 31.9 G/DL (ref 31–37)
MCV RBC AUTO: 103.7 FL (ref 78–100)
MONOCYTES # BLD: 0.6 K/UL (ref 0.05–1.2)
MONOCYTES NFR BLD: 10 % (ref 3–10)
NEUTS SEG # BLD: 3 K/UL (ref 1.8–8)
NEUTS SEG NFR BLD: 52 % (ref 40–73)
NRBC # BLD: 0 K/UL (ref 0–0.01)
NRBC BLD-RTO: 0 PER 100 WBC
PLATELET # BLD AUTO: 300 K/UL (ref 135–420)
PMV BLD AUTO: 9.6 FL (ref 9.2–11.8)
POTASSIUM SERPL-SCNC: 3.8 MMOL/L (ref 3.5–5.5)
PROT SERPL-MCNC: 7.8 G/DL (ref 6.4–8.2)
Q-T INTERVAL, ECG07: 368 MS
QRS DURATION, ECG06: 92 MS
QTC CALCULATION (BEZET), ECG08: 467 MS
RBC # BLD AUTO: 2.99 M/UL (ref 4.2–5.3)
SODIUM SERPL-SCNC: 138 MMOL/L (ref 136–145)
TROPONIN-HIGH SENSITIVITY: 24 NG/L (ref 0–54)
TROPONIN-HIGH SENSITIVITY: 27 NG/L (ref 0–54)
VENTRICULAR RATE, ECG03: 97 BPM
WBC # BLD AUTO: 5.8 K/UL (ref 4.6–13.2)

## 2022-10-19 PROCEDURE — 93005 ELECTROCARDIOGRAM TRACING: CPT

## 2022-10-19 PROCEDURE — 85025 COMPLETE CBC W/AUTO DIFF WBC: CPT

## 2022-10-19 PROCEDURE — 71045 X-RAY EXAM CHEST 1 VIEW: CPT

## 2022-10-19 PROCEDURE — 84484 ASSAY OF TROPONIN QUANT: CPT

## 2022-10-19 PROCEDURE — 99285 EMERGENCY DEPT VISIT HI MDM: CPT

## 2022-10-19 PROCEDURE — 80053 COMPREHEN METABOLIC PANEL: CPT

## 2022-10-19 PROCEDURE — 83880 ASSAY OF NATRIURETIC PEPTIDE: CPT

## 2022-10-19 NOTE — ED PROVIDER NOTES
76year old female with a pmh of ESRD on dialysis, chronic hypotension, DM2, paroxysmal A-FIB s/p watchman left atrial appendage closure presents with chest pain that began at 1230 today. Patient states she was at dialysis when she began to experience a central chest pressure that radiated up into her jaw. Also endorses some radiation to her back. Patient states she had some shortness of breath at the time. She states over the few weeks she has had 3-4 pre-syncopal episodes. During these episodes she states her heart is racing and she feels lightheaded. She presented to cardiology yesterday due to these symptoms and was found to be in A-fib. Currently on plavix and aspirin, no blood thinners. She did finish the majority of her dialysis session today. EKG yesterday demonstrated a junctional rhythm with a rate of 47. No history of PE/DVT, no hormones, no recent surgery, no h/o cancer, no hemoptysis. Past Medical History:   Diagnosis Date    Anemia in end-stage renal disease ([facility] [address]     Anticoagulated by anticoagulation treatment     On Apixaban    Chronic hypotension     On Midodrine    Chronic pain     Closed fracture of left inferior pubic ramus, with routine healing, subsequent encounter 11/12/2021    Closed fracture of superior pubic ramus, left, with routine healing, subsequent encounter 11/12/2021    Closed nondisplaced fracture of anterior wall of left acetabulum with routine healing 11/12/2021    Depression     End-stage renal disease on hemodialysis ([facility] [address]     HD at [facility] on [facility] on [address].  Tel # [phone]    Gastroesophageal reflux disease     Glaucoma     History of acute pyelonephritis 02/20/2020    History of hydronephrosis 10/05/2021    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    Urine culture (collected 10/8/2021, resulted 10/14/2021) yielded growth of >100,000 colonies/ml of Enterococcus faecalis RESISTANT to Ciprofloxacin, Levofloxacin, Tetracycline and Vancomycin    History of kidney stones     History of recurrent urinary tract infection     History of sepsis 06/18/2021    History of septic shock 10/08/2021    History of urethral stricture     History of urinary tract infection 10/08/2021    Urine culture (collected 10/8/2021, resulted 10/14/2021) yielded growth of >100,000 colonies/ml of Enterococcus faecalis RESISTANT to Ciprofloxacin, Levofloxacin, Tetracycline and Vancomycin    Hyperlipidemia     Hyperphosphatemia 11/14/2021    Hypothyroidism     Lung mass     Mononeuropathy     Involving ring finger of left hand    Need for prophylactic isolation 10/08/2021    Urine culture (collected 10/8/2021, resulted 10/14/2021) yielded growth of >100,000 colonies/ml of Enterococcus faecalis RESISTANT to Ciprofloxacin, Levofloxacin, Tetracycline and Vancomycin    Osteoporosis     Paroxysmal atrial fibrillation (HCC)     Secondary hyperparathyroidism of renal origin ([facility] [address]     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 ([facility] [address]     HbA1c (10/8/2021) = 4.6    Uric acid nephrolithiasis     Urinary incontinence        Past Surgical History:   Procedure Laterality Date    HX APPENDECTOMY      HX CHOLECYSTECTOMY      HX GASTRIC BYPASS      Gastric stapling    HX KNEE ARTHROSCOPY      HX UROLOGICAL      right PCN placement    HX UROLOGICAL  07/23/2018    RIGHT URETEROSCOPY WITH HOLMIUM LASER    IR EXCHANGE NEPHRO PERC LT SI  2/21/2020    IR EXCHANGE NEPHRO PERC RT SI  4/13/2020    IR EXCHANGE NEPHRO PERC RT SI  7/17/2020    IR NEPHROSTOMY PERC RT PLC CATH  SI  10/14/2020    IR NEPHROURETERAL PERC RT PLC CATH NEW ACCESS  SI  4/30/2020    MD INTRO CATH DIALYSIS CIRCUIT DX ANGRPH FLUOR S&I Left 9/24/2020    FISTULOGRAM LEFT/poss permanent catheter placement performed by Hamlet Avendaño MD at [facility] CATH LAB    VASCULAR SURGERY PROCEDURE UNLIST      lef AVF         Family History:   Problem Relation Age of Onset    Heart Surgery Sister        Social History Socioeconomic History    Marital status: SINGLE     Spouse name: Not on file    Number of children: Not on file    Years of education: Not on file    Highest education level: Not on file   Occupational History    Not on file   Tobacco Use    Smoking status: Never    Smokeless tobacco: Never   Vaping Use    Vaping Use: Never used   Substance and Sexual Activity    Alcohol use: Never    Drug use: Never    Sexual activity: Not on file   Other Topics Concern    Not on file   Social History Narrative    Not on file     Social Determinants of Health     Financial Resource Strain: Not on file   Food Insecurity: Not on file   Transportation Needs: Not on file   Physical Activity: Not on file   Stress: Not on file   Social Connections: Not on file   Intimate Partner Violence: Not on file   Housing Stability: Not on file         ALLERGIES: Albumin, human 25 %; Ciprofloxacin; Cyclopentolate; Iron sucrose; and Statins-hmg-coa reductase inhibitors    Review of Systems   Constitutional:  Positive for fatigue. Negative for fever. HENT: Negative. Eyes: Negative. Respiratory:  Positive for shortness of breath. Negative for cough. Cardiovascular:  Positive for chest pain and palpitations. Negative for leg swelling. Gastrointestinal:  Negative for abdominal pain. Genitourinary: Negative. Musculoskeletal:  Positive for back pain. Skin: Negative. Neurological:  Positive for syncope and light-headedness. Psychiatric/Behavioral: Negative. Vitals:    10/19/22 1343   BP: (!) 125/49   Pulse: 97   Resp: 16   Temp: 97 °F (36.1 °C)   Weight: 91.6 kg (202 lb)   Height: 5' 2\" (1.575 m)            Physical Exam  HENT:      Head: Normocephalic and atraumatic. Eyes:      Extraocular Movements: Extraocular movements intact. Cardiovascular:      Rate and Rhythm: Tachycardia present. Rhythm irregular. Pulmonary:      Effort: Pulmonary effort is normal. No tachypnea or respiratory distress.       Breath sounds: Normal breath sounds. No decreased breath sounds, wheezing or rhonchi. Abdominal:      Palpations: Abdomen is soft. Tenderness: There is no abdominal tenderness. Musculoskeletal:         General: Normal range of motion. Cervical back: Normal range of motion. Right lower leg: No edema. Left lower leg: No edema. Skin:     General: Skin is dry. Neurological:      General: No focal deficit present. Mental Status: She is alert and oriented to person, place, and time. Psychiatric:         Mood and Affect: Mood normal.        MDM  75 yo female presenting with recent pre-syncope and chest pain that is no longer present in the ER. Found to be in A-fib with an acceptable rate. CBC shows anemia with normal WBC. CMP w/ in normal limits for ESRD patient. BNP 8779, elevated from prior 1 year ago which was 5643. Patient does not appear grossly fluid overloaded and ESRD limits BNP interpretation. Initial and repeat troponins neg. CXR shows no acute infection; read as old rib fxs (patient confirms), mildly enlarged heart, atelectasis. Spoke with Dr. Guerrier Shock with cardiac specialists [facility] and together we agree that there is not an indication to admit this patient at this time. With 2 troponins and no active chest pain ER is unlikely she is having ischemic event this time. Patient is [facility] low risk, will not order dimer today. Instructed patient to follow-up with her cardiologist for  management of her A. fib which is likely causing her presyncopal symptoms. She did see her cardiologist yesterday, but unable to see note in chart. Discharged with return precautions.        EKG    Date/Time: 10/19/2022 4:33 PM  Performed by: Melyssa Lucio MD  Authorized by: Melyssa Lucio MD     Rate:     ECG rate assessment: normal    Rhythm:     Rhythm: atrial fibrillation    QRS:     QRS axis:  Normal    QRS intervals:  Normal    QRS conduction: normal    ST segments:     ST segments:  Non-specific  T waves:     T waves: non-specific    Q waves:     Abnormal Q-waves: not present

## 2022-10-19 NOTE — ED TRIAGE NOTES
Pt arrived via EMS for reported chest pain while she's doing dialysis, on EMS arrival, pt's chest pain was resolved and her EKG is Afib with HR between . Hx of afib,ckd, htn.  Pt is A&Ox4, denies chest pain on Ed arrival.

## 2022-10-21 ENCOUNTER — APPOINTMENT (OUTPATIENT)
Dept: CT IMAGING | Age: 77
End: 2022-10-21
Attending: EMERGENCY MEDICINE
Payer: MEDICARE

## 2022-10-21 ENCOUNTER — HOSPITAL ENCOUNTER (EMERGENCY)
Age: 77
Discharge: HOME | End: 2022-10-21
Attending: EMERGENCY MEDICINE
Payer: MEDICARE

## 2022-10-21 ENCOUNTER — APPOINTMENT (OUTPATIENT)
Dept: GENERAL RADIOLOGY | Age: 77
End: 2022-10-21
Attending: EMERGENCY MEDICINE
Payer: MEDICARE

## 2022-10-21 VITALS
HEART RATE: 56 BPM | OXYGEN SATURATION: 93 % | DIASTOLIC BLOOD PRESSURE: 54 MMHG | TEMPERATURE: 97.8 F | SYSTOLIC BLOOD PRESSURE: 98 MMHG | RESPIRATION RATE: 13 BRPM

## 2022-10-21 DIAGNOSIS — N18.6 ESRD ON DIALYSIS (HCC): ICD-10-CM

## 2022-10-21 DIAGNOSIS — Z99.2 ESRD ON DIALYSIS (HCC): ICD-10-CM

## 2022-10-21 DIAGNOSIS — R07.89 ATYPICAL CHEST PAIN: Primary | ICD-10-CM

## 2022-10-21 LAB
ALBUMIN SERPL-MCNC: 3.4 G/DL (ref 3.4–5)
ALBUMIN/GLOB SERPL: 1 {RATIO} (ref 0.8–1.7)
ALP SERPL-CCNC: 96 U/L (ref 45–117)
ALT SERPL-CCNC: 14 U/L (ref 13–56)
ANION GAP SERPL CALC-SCNC: 9 MMOL/L (ref 3–18)
AST SERPL-CCNC: 19 U/L (ref 10–38)
BASOPHILS # BLD: 0.1 K/UL (ref 0–0.1)
BASOPHILS NFR BLD: 1 % (ref 0–2)
BILIRUB SERPL-MCNC: 0.4 MG/DL (ref 0.2–1)
BUN SERPL-MCNC: 21 MG/DL (ref 7–18)
BUN/CREAT SERPL: 5 (ref 12–20)
CALCIUM SERPL-MCNC: 8.7 MG/DL (ref 8.5–10.1)
CALCULATED R AXIS, ECG10: -14 DEGREES
CALCULATED T AXIS, ECG11: 113 DEGREES
CHLORIDE SERPL-SCNC: 99 MMOL/L (ref 100–111)
CO2 SERPL-SCNC: 30 MMOL/L (ref 21–32)
CREAT SERPL-MCNC: 4.46 MG/DL (ref 0.6–1.3)
DIAGNOSIS, 93000: NORMAL
DIFFERENTIAL METHOD BLD: ABNORMAL
EOSINOPHIL # BLD: 0.1 K/UL (ref 0–0.4)
EOSINOPHIL NFR BLD: 1 % (ref 0–5)
ERYTHROCYTE [DISTWIDTH] IN BLOOD BY AUTOMATED COUNT: 14.5 % (ref 11.6–14.5)
GLOBULIN SER CALC-MCNC: 3.5 G/DL (ref 2–4)
GLUCOSE SERPL-MCNC: 88 MG/DL (ref 74–99)
HCT VFR BLD AUTO: 29.4 % (ref 35–45)
HGB BLD-MCNC: 9.4 G/DL (ref 12–16)
IMM GRANULOCYTES # BLD AUTO: 0.2 K/UL (ref 0–0.04)
IMM GRANULOCYTES NFR BLD AUTO: 3 % (ref 0–0.5)
LIPASE SERPL-CCNC: 81 U/L (ref 73–393)
LYMPHOCYTES # BLD: 1.9 K/UL (ref 0.9–3.6)
LYMPHOCYTES NFR BLD: 27 % (ref 21–52)
MCH RBC QN AUTO: 33.8 PG (ref 24–34)
MCHC RBC AUTO-ENTMCNC: 32 G/DL (ref 31–37)
MCV RBC AUTO: 105.8 FL (ref 78–100)
MONOCYTES # BLD: 0.6 K/UL (ref 0.05–1.2)
MONOCYTES NFR BLD: 9 % (ref 3–10)
NEUTS SEG # BLD: 4.1 K/UL (ref 1.8–8)
NEUTS SEG NFR BLD: 59 % (ref 40–73)
NRBC # BLD: 0 K/UL (ref 0–0.01)
NRBC BLD-RTO: 0 PER 100 WBC
PLATELET # BLD AUTO: 331 K/UL (ref 135–420)
PMV BLD AUTO: 10 FL (ref 9.2–11.8)
POTASSIUM SERPL-SCNC: 4.6 MMOL/L (ref 3.5–5.5)
PROT SERPL-MCNC: 6.9 G/DL (ref 6.4–8.2)
Q-T INTERVAL, ECG07: 330 MS
QRS DURATION, ECG06: 92 MS
QTC CALCULATION (BEZET), ECG08: 332 MS
RBC # BLD AUTO: 2.78 M/UL (ref 4.2–5.3)
SODIUM SERPL-SCNC: 138 MMOL/L (ref 136–145)
TROPONIN-HIGH SENSITIVITY: 24 NG/L (ref 0–54)
TROPONIN-HIGH SENSITIVITY: 27 NG/L (ref 0–54)
VENTRICULAR RATE, ECG03: 61 BPM
WBC # BLD AUTO: 7 K/UL (ref 4.6–13.2)

## 2022-10-21 PROCEDURE — 93005 ELECTROCARDIOGRAM TRACING: CPT

## 2022-10-21 PROCEDURE — 83690 ASSAY OF LIPASE: CPT

## 2022-10-21 PROCEDURE — 71250 CT THORAX DX C-: CPT

## 2022-10-21 PROCEDURE — 80053 COMPREHEN METABOLIC PANEL: CPT

## 2022-10-21 PROCEDURE — 94762 N-INVAS EAR/PLS OXIMTRY CONT: CPT

## 2022-10-21 PROCEDURE — 77010033678 HC OXYGEN DAILY

## 2022-10-21 PROCEDURE — 99285 EMERGENCY DEPT VISIT HI MDM: CPT

## 2022-10-21 PROCEDURE — 71045 X-RAY EXAM CHEST 1 VIEW: CPT

## 2022-10-21 PROCEDURE — 85025 COMPLETE CBC W/AUTO DIFF WBC: CPT

## 2022-10-21 PROCEDURE — 84484 ASSAY OF TROPONIN QUANT: CPT

## 2022-10-21 NOTE — ED PROVIDER NOTES
EMERGENCY DEPARTMENT HISTORY AND PHYSICAL EXAM    Date: 10/21/2022  Patient Name: Rika Fitzgerald    History of Presenting Illness     Chief Complaint   Patient presents with    Chest Pain         History Provided By: Patient        Additional History (Context): Rika Fitzgerald is a 76 y.o. female with hypertension and ESRD on HD  who presents with complaint of exertional chest pain shortness of breath as well. Chest pain that radiates into her back. Seen in the ED on Wednesday; symptoms began 4 days ago and she saw her cardiologist Dr. Moisés Heart 3 days ago. She says he had her stop one of her A. fib medications and placed her on 2 new ones which she is taking. She is not sure of one of the medications names but she knows one of them is digoxin. (Friend says other is metoprolol) Unable to complete her dialysis today after an hour and a half because of her symptoms. PCP: Radha Alicea MD    Current Outpatient Medications   Medication Sig Dispense Refill    aspirin delayed-release 81 mg tablet Take 1 Tablet by mouth daily. 30 Tablet 5    clopidogreL (Plavix) 75 mg tab Take 1 Tablet by mouth in the morning. 30 Tablet 4    gabapentin (NEURONTIN) 100 mg capsule Take 1 Capsule by mouth nightly. Max Daily Amount: 100 mg. Indications: concern for back pain post dialysis 30 Capsule 0    melatonin 5 mg tablet Take 5 mg by mouth nightly. HYDROmorphone (DILAUDID) 2 mg tablet Take 1 mg by mouth every eight (8) hours as needed for Pain. Take 1/2 tablet by mouth every 8 hours as needed for pain level of 5 out of 10 or greater (Patient not taking: No sig reported)      allopurinoL (ZYLOPRIM) 100 mg tablet Take 1 Tablet by mouth every Monday, Wednesday, Friday. [after hemodialysis]  Indications: treatment to prevent acute gout attack 12 Tablet 0    amiodarone (CORDARONE) 200 mg tablet Take 1 Tablet by mouth daily.  Indications: prevention of recurrent atrial fibrillation 30 Tablet 0    sevelamer carbonate (RENVELA) 800 mg tab tab Take 2 Tablets by mouth three (3) times daily (with meals). Indications: renal osteodystrophy with hyperphosphatemia 180 Tablet 0    acetaminophen (Tylenol Extra Strength) 500 mg tablet Take 1 Tablet by mouth every six (6) hours as needed for Pain. 30 Tablet 0    lidocaine (LIDODERM) 5 % Apply patch to the affected area for 12 hours a day and remove for 12 hours a day. (Patient not taking: Reported on 8/18/2022) 1 Each 0    meclizine (ANTIVERT) 25 mg tablet Take 25 mg by mouth three (3) times daily as needed for Dizziness. (Patient not taking: Reported on 8/18/2022)      DULoxetine (CYMBALTA) 20 mg capsule Take 20 mg by mouth daily. estradioL (Estrace) 0.01 % (0.1 mg/gram) vaginal cream Apply a fingertip amount around the urethra three times a week. (Patient not taking: Reported on 8/30/2022) 30 g 3    biotin 1,000 mcg chew Take 1 Tab by mouth daily. cyanocobalamin 1,000 mcg tablet Take 1,000 mcg by mouth daily. (Patient not taking: Reported on 8/30/2022)      lactobacillus sp. 50 billion cpu (BIO-K PLUS) 50 billion cell -375 mg cap capsule Take 1 Cap by mouth daily. 30 Cap 2    ascorbic acid, vitamin C, (VITAMIN C) 500 mg tablet Take 500 mg by mouth daily. cholecalciferol (VITAMIN D3) (2,000 UNITS /50 MCG) cap capsule Take 2,000 Units by mouth two (2) times a day. latanoprost (XALATAN) 0.005 % ophthalmic solution Administer 1 Drop to both eyes nightly. One drop at bedtime      levothyroxine (SYNTHROID) 125 mcg tablet Take 125 mcg by mouth Daily (before breakfast). omeprazole (PRILOSEC) 20 mg capsule Take 20 mg by mouth daily. ondansetron (ZOFRAN ODT) 4 mg disintegrating tablet Take 4 mg by mouth every eight (8) hours as needed for Nausea or Vomiting. (Patient not taking: Reported on 8/18/2022)      vit B Cmplx 3-FA-Vit C-Biotin (NEPHRO HOWIE RX) 1- mg-mg-mcg tablet Take 1 Tab by mouth daily.          Past History     Past Medical History:  Past Medical History:   Diagnosis Date Anemia in end-stage renal disease (HCC)     Anticoagulated by anticoagulation treatment     On Apixaban    Chronic hypotension     On Midodrine    Chronic pain     Closed fracture of left inferior pubic ramus, with routine healing, subsequent encounter 11/12/2021    Closed fracture of superior pubic ramus, left, with routine healing, subsequent encounter 11/12/2021    Closed nondisplaced fracture of anterior wall of left acetabulum with routine healing 11/12/2021    Depression     End-stage renal disease on hemodialysis ([address]     HD at [facility] on [facility] on [facility].  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    Urine culture (collected 10/8/2021, resulted 10/14/2021) yielded growth of >100,000 colonies/ml of Enterococcus faecalis RESISTANT to Ciprofloxacin, Levofloxacin, Tetracycline and Vancomycin    History of kidney stones     History of recurrent urinary tract infection     History of sepsis 06/18/2021    History of septic shock 10/08/2021    History of urethral stricture     History of urinary tract infection 10/08/2021    Urine culture (collected 10/8/2021, resulted 10/14/2021) yielded growth of >100,000 colonies/ml of Enterococcus faecalis RESISTANT to Ciprofloxacin, Levofloxacin, Tetracycline and Vancomycin    Hyperlipidemia     Hyperphosphatemia 11/14/2021    Hypothyroidism     Lung mass     Mononeuropathy     Involving ring finger of left hand    Need for prophylactic isolation 10/08/2021    Urine culture (collected 10/8/2021, resulted 10/14/2021) yielded growth of >100,000 colonies/ml of Enterococcus faecalis RESISTANT to Ciprofloxacin, Levofloxacin, Tetracycline and Vancomycin    Osteoporosis     Paroxysmal atrial fibrillation ([address]     Secondary hyperparathyroidism of renal origin ([address]     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)     HbA1c (10/8/2021) = 4.6    Uric acid nephrolithiasis     Urinary incontinence        Past Surgical History:  Past Surgical History:   Procedure Laterality Date    HX APPENDECTOMY      HX CHOLECYSTECTOMY      HX GASTRIC BYPASS      Gastric stapling    HX KNEE ARTHROSCOPY      HX UROLOGICAL      right PCN placement    HX UROLOGICAL  07/23/2018    RIGHT URETEROSCOPY WITH HOLMIUM LASER    IR EXCHANGE NEPHRO PERC LT SI  2/21/2020    IR EXCHANGE NEPHRO PERC RT SI  4/13/2020    IR EXCHANGE NEPHRO PERC RT SI  7/17/2020    IR NEPHROSTOMY PERC RT PLC CATH  SI  10/14/2020    IR NEPHROURETERAL PERC RT PLC CATH NEW ACCESS  SI  4/30/2020    IL INTRO CATH DIALYSIS CIRCUIT DX ANGRPH FLUOR S&I Left 9/24/2020    FISTULOGRAM LEFT/poss permanent catheter placement performed by Francois Thompson MD at [facility] CATH LAB    VASCULAR SURGERY PROCEDURE UNLIST      lef AVF       Family History:  Family History   Problem Relation Age of Onset    Heart Surgery Sister        Social History:  Social History     Tobacco Use    Smoking status: Never    Smokeless tobacco: Never   Vaping Use    Vaping Use: Never used   Substance Use Topics    Alcohol use: Never    Drug use: Never       Allergies: Allergies   Allergen Reactions    Albumin, Human 25 % Itching     Headache - severe migraine like, itchy eyes, runny nose    Ciprofloxacin Hives    Cyclopentolate Unknown (comments)    Iron Sucrose Diarrhea    Statins-Hmg-Coa Reductase Inhibitors Other (comments)     Body ache         Review of Systems   Review of Systems   Constitutional:  Negative for fever. HENT: Negative. Eyes: Negative. Respiratory:  Positive for shortness of breath. Negative for cough and wheezing. Cardiovascular:  Positive for chest pain. Gastrointestinal: Negative. Endocrine: Negative. Genitourinary: Negative. Musculoskeletal: Negative. Skin: Negative. Allergic/Immunologic: Negative. Neurological: Negative. Hematological: Negative. Psychiatric/Behavioral: Negative. All Other Systems Negative  Physical Exam     Vitals:    10/21/22 1131 10/21/22 1354 10/21/22 1428   BP: (!) 93/55 117/78 (!) 98/54   Pulse: 62  (!) 56   Resp: 13  13   Temp: 97.8 °F (36.6 °C)     SpO2: 93%       Physical Exam  Vitals and nursing note reviewed. Constitutional:       Appearance: She is well-developed. HENT:      Head: Normocephalic and atraumatic. Right Ear: External ear normal.      Left Ear: External ear normal.      Nose: Nose normal.   Eyes:      Conjunctiva/sclera: Conjunctivae normal.      Pupils: Pupils are equal, round, and reactive to light. Neck:      Vascular: No JVD. Trachea: No tracheal deviation. Cardiovascular:      Rate and Rhythm: Normal rate and regular rhythm. Pulses: Normal pulses. Heart sounds: Normal heart sounds. No murmur heard. No friction rub. No gallop. Comments: Equal radial pulses  Pulmonary:      Effort: Pulmonary effort is normal. No respiratory distress. Breath sounds: Normal breath sounds. No wheezing or rales. Abdominal:      General: Bowel sounds are normal. There is no distension. Palpations: Abdomen is soft. There is no mass. Tenderness: There is no abdominal tenderness. There is no guarding or rebound. Musculoskeletal:         General: No tenderness. Normal range of motion. Cervical back: Normal range of motion and neck supple. Skin:     General: Skin is warm and dry. Findings: No rash. Neurological:      Mental Status: She is alert and oriented to person, place, and time. Cranial Nerves: No cranial nerve deficit. Deep Tendon Reflexes: Reflexes are normal and symmetric.    Psychiatric:         Behavior: Behavior normal.          Diagnostic Study Results     Labs -     Recent Results (from the past 12 hour(s))   EKG, 12 LEAD, INITIAL    Collection Time: 10/21/22 11:24 AM   Result Value Ref Range    Ventricular Rate 61 BPM    QRS Duration 92 ms    Q-T Interval 330 ms    QTC Calculation (Bezet) 332 ms    Calculated R Axis -14 degrees    Calculated T Axis 113 degrees    Diagnosis       Atrial fibrillation  Low voltage QRS  Cannot rule out Anterior infarct , age undetermined  Abnormal ECG  Confirmed by Michelle Jackson MD, Chrissie Hooks ([id number]) on 10/21/2022 1:17:16 PM     CBC WITH AUTOMATED DIFF    Collection Time: 10/21/22 11:30 AM   Result Value Ref Range    WBC 7.0 4.6 - 13.2 K/uL    RBC 2.78 (L) 4.20 - 5.30 M/uL    HGB 9.4 (L) 12.0 - 16.0 g/dL    HCT 29.4 (L) 35.0 - 45.0 %    .8 (H) 78.0 - 100.0 FL    MCH 33.8 24.0 - 34.0 PG    MCHC 32.0 31.0 - 37.0 g/dL    RDW 14.5 11.6 - 14.5 %    PLATELET 275 087 - 787 K/uL    MPV 10.0 9.2 - 11.8 FL    NRBC 0.0 0  WBC    ABSOLUTE NRBC 0.00 0.00 - 0.01 K/uL    NEUTROPHILS 59 40 - 73 %    LYMPHOCYTES 27 21 - 52 %    MONOCYTES 9 3 - 10 %    EOSINOPHILS 1 0 - 5 %    BASOPHILS 1 0 - 2 %    IMMATURE GRANULOCYTES 3 (H) 0.0 - 0.5 %    ABS. NEUTROPHILS 4.1 1.8 - 8.0 K/UL    ABS. LYMPHOCYTES 1.9 0.9 - 3.6 K/UL    ABS. MONOCYTES 0.6 0.05 - 1.2 K/UL    ABS. EOSINOPHILS 0.1 0.0 - 0.4 K/UL    ABS. BASOPHILS 0.1 0.0 - 0.1 K/UL    ABS. IMM. GRANS. 0.2 (H) 0.00 - 0.04 K/UL    DF AUTOMATED     METABOLIC PANEL, COMPREHENSIVE    Collection Time: 10/21/22 11:30 AM   Result Value Ref Range    Sodium 138 136 - 145 mmol/L    Potassium 4.6 3.5 - 5.5 mmol/L    Chloride 99 (L) 100 - 111 mmol/L    CO2 30 21 - 32 mmol/L    Anion gap 9 3.0 - 18 mmol/L    Glucose 88 74 - 99 mg/dL    BUN 21 (H) 7.0 - 18 MG/DL    Creatinine 4.46 (H) 0.6 - 1.3 MG/DL    BUN/Creatinine ratio 5 (L) 12 - 20      eGFR 10 (L) >60 ml/min/1.73m2    Calcium 8.7 8.5 - 10.1 MG/DL    Bilirubin, total 0.4 0.2 - 1.0 MG/DL    ALT (SGPT) 14 13 - 56 U/L    AST (SGOT) 19 10 - 38 U/L    Alk.  phosphatase 96 45 - 117 U/L    Protein, total 6.9 6.4 - 8.2 g/dL    Albumin 3.4 3.4 - 5.0 g/dL    Globulin 3.5 2.0 - 4.0 g/dL    A-G Ratio 1.0 0.8 - 1.7     TROPONIN-HIGH SENSITIVITY    Collection Time: 10/21/22 11:30 AM   Result Value Ref Range    Troponin-High Sensitivity 27 0 - 54 ng/L   TROPONIN-HIGH SENSITIVITY    Collection Time: 10/21/22  2:45 PM   Result Value Ref Range    Troponin-High Sensitivity 24 0 - 54 ng/L   LIPASE    Collection Time: 10/21/22  2:45 PM   Result Value Ref Range    Lipase 81 73 - 393 U/L       Radiologic Studies -   CT CHEST ABD PELV WO CONT   Final Result      No acute abnormalities are identified in the chest abdomen or pelvis. All CT scans at this facility are performed using dose optimization technique as   appropriate to the performed exam, to include automated exposure control,   adjustment of the mA and/or kV according to patient's size (Including   appropriate matching for site-specific examinations), or use of iterative   reconstruction technique. Dry      XR CHEST PORT   Final Result   1. No acute infiltrate or effusion. 2.  Mild scarring in the right midlung field. CT Results  (Last 48 hours)                 10/21/22 1519  CT CHEST ABD PELV WO CONT Final result    Impression:      No acute abnormalities are identified in the chest abdomen or pelvis. All CT scans at this facility are performed using dose optimization technique as   appropriate to the performed exam, to include automated exposure control,   adjustment of the mA and/or kV according to patient's size (Including   appropriate matching for site-specific examinations), or use of iterative   reconstruction technique. Dry       Narrative:  CT CHEST,  ABDOMEN AND PELVIS WITH CONTRAST        COMPARISON: The pelvis March 2022, CT chest abdomen and pelvis November 2021       INDICATIONS: Abdominal pain and chest pain on dialysis       TECHNIQUE:            Scanning was done with a multislice scanner. Volumetric data acquisition was   performed through the chest, abdomen, and pelvis. Reconstructions were created   in the axial, coronal, and sagittal planes. Padma Key CT CHEST FINDINGS:       The lungs are clear. There is no pneumothorax or pleural fluid. The base of the neck is unremarkable. The cardiomediastinal structures appear unremarkable . CT ABDOMEN FINDINGS:       Liver: Previous cholecystectomy. No lesions evident   Spleen: Negative. Small accessory splenule noted. .   Left Kidney: Atrophic with small cysts evident. .   Right Kidney: Atrophic. Small cysts evident. Ureteral stent present extending to   the bladder from the contracted renal pelvis. Pancreas: Normal.   Adrenal Glands: Negative. Stomach, Small Bowel And Colon: Negative. Lymph Nodes: There is no pathologic-appearing adenopathy evident. Peritoneal Spaces: There is no free fluid or free air. The bladder is incompletely distended but unremarkable. The uterus and adnexal structures are unremarkable            Osseous Structures Of Abdomen And Pelvis: No acute or aggressive findings are   evident. .                 CXR Results  (Last 48 hours)                 10/21/22 1238  XR CHEST PORT Final result    Impression:  1. No acute infiltrate or effusion. 2.  Mild scarring in the right midlung field. Narrative:  EXAM: Chest Radiograph       INDICATION:  SOB       TECHNIQUE: AP view of the chest       COMPARISON: 10/19/2022, 8/18/2022, 5/25/2022       FINDINGS: No pneumothorax identified. There is scarring in the right midlung   field. There is mild elevation right hemidiaphragm. This is unchanged. No   effusions identified. The cardiomediastinal silhouette is unremarkable. The   pulmonary vasculature is unremarkable. The osseous structures are unremarkable. Medical Decision Making   I am the first provider for this patient. I reviewed the vital signs, available nursing notes, past medical history, past surgical history, family history and social history. Vital Signs-Reviewed the patient's vital signs.     Records Reviewed: Nursing Notes, Old Medical Records, Previous Radiology Studies, and Previous Laboratory Studies    Procedures:  Procedures    Provider Notes (Medical Decision Making):   Exertional CP, SOB x 4d. Had left and right heart catheterization this may w/o CAD found. Getting second troponin, lipase, CT chest/abd pelvis to elucidate cause of pt's pain. Patient's repeat troponin is actually improved from first.  Her lipase is normal her CTA chest is normal.  Spoke with Faisal Wilson for Dr. Connell Sessions coverage today and will have her follow-up with him closely next week for outpatient nuclear stress test  Dr. Gama Nicole thinks there is no urgent need with patient soft blood pressures and normal potassium for patient to continue with dialysis today. MED RECONCILIATION:  No current facility-administered medications for this encounter. Current Outpatient Medications   Medication Sig    aspirin delayed-release 81 mg tablet Take 1 Tablet by mouth daily. clopidogreL (Plavix) 75 mg tab Take 1 Tablet by mouth in the morning.    gabapentin (NEURONTIN) 100 mg capsule Take 1 Capsule by mouth nightly. Max Daily Amount: 100 mg. Indications: concern for back pain post dialysis    melatonin 5 mg tablet Take 5 mg by mouth nightly. HYDROmorphone (DILAUDID) 2 mg tablet Take 1 mg by mouth every eight (8) hours as needed for Pain. Take 1/2 tablet by mouth every 8 hours as needed for pain level of 5 out of 10 or greater (Patient not taking: No sig reported)    allopurinoL (ZYLOPRIM) 100 mg tablet Take 1 Tablet by mouth every Monday, Wednesday, Friday. [after hemodialysis]  Indications: treatment to prevent acute gout attack    amiodarone (CORDARONE) 200 mg tablet Take 1 Tablet by mouth daily. Indications: prevention of recurrent atrial fibrillation    sevelamer carbonate (RENVELA) 800 mg tab tab Take 2 Tablets by mouth three (3) times daily (with meals).  Indications: renal osteodystrophy with hyperphosphatemia    acetaminophen (Tylenol Extra Strength) 500 mg tablet Take 1 Tablet by mouth every six (6) hours as needed for Pain.    lidocaine (LIDODERM) 5 % Apply patch to the affected area for 12 hours a day and remove for 12 hours a day. (Patient not taking: Reported on 8/18/2022)    meclizine (ANTIVERT) 25 mg tablet Take 25 mg by mouth three (3) times daily as needed for Dizziness. (Patient not taking: Reported on 8/18/2022)    DULoxetine (CYMBALTA) 20 mg capsule Take 20 mg by mouth daily. estradioL (Estrace) 0.01 % (0.1 mg/gram) vaginal cream Apply a fingertip amount around the urethra three times a week. (Patient not taking: Reported on 8/30/2022)    biotin 1,000 mcg chew Take 1 Tab by mouth daily. cyanocobalamin 1,000 mcg tablet Take 1,000 mcg by mouth daily. (Patient not taking: Reported on 8/30/2022)    lactobacillus sp. 50 billion cpu (BIO-K PLUS) 50 billion cell -375 mg cap capsule Take 1 Cap by mouth daily. ascorbic acid, vitamin C, (VITAMIN C) 500 mg tablet Take 500 mg by mouth daily. cholecalciferol (VITAMIN D3) (2,000 UNITS /50 MCG) cap capsule Take 2,000 Units by mouth two (2) times a day. latanoprost (XALATAN) 0.005 % ophthalmic solution Administer 1 Drop to both eyes nightly. One drop at bedtime    levothyroxine (SYNTHROID) 125 mcg tablet Take 125 mcg by mouth Daily (before breakfast). omeprazole (PRILOSEC) 20 mg capsule Take 20 mg by mouth daily. ondansetron (ZOFRAN ODT) 4 mg disintegrating tablet Take 4 mg by mouth every eight (8) hours as needed for Nausea or Vomiting. (Patient not taking: Reported on 8/18/2022)    vit B Cmplx 3-FA-Vit C-Biotin (NEPHRO HOWIE RX) 1- mg-mg-mcg tablet Take 1 Tab by mouth daily. Disposition:  home    DISCHARGE NOTE:   4:37 PM    Pt has been reexamined. Patient has no new complaints, changes, or physical findings. Care plan outlined and precautions discussed. Results of labs, CT, CXR were reviewed with the patient. All medications were reviewed with the patient.  All of pt's questions and concerns were addressed. Patient was instructed and agrees to follow up with cardiology, nephrology, as well as to return to the ED upon further deterioration. Patient is ready to go home. Follow-up Information       Follow up With Specialties Details Why Contact Info    Heriberto Reilly MD Cardio Vascular Surgery, Cardiovascular Disease Physician Schedule an appointment as soon as possible for a visit in 3 days  [address] [facility] EMERGENCY DEPT Emergency Medicine  If symptoms worsen return immediately [address] [place] [address]    Abner Young MD Nephrology Schedule an appointment as soon as possible for a visit in 3 days  [id number] [address] [place] [id number]  [phone]              Current Discharge Medication List          Diagnosis     Clinical Impression:   1. Atypical chest pain    2.  ESRD on dialysis [facility])

## 2022-10-21 NOTE — DISCHARGE INSTRUCTIONS
You have been evaluated today for your chest pain. 2 cardiac enzymes are normal.  There are no acute EKG changes for you. Please call Dr. Laurent Ruiz office early next week for an appointment to be seen within 1 week. For any questions or worsening symptoms of pain shortness of breath please do not hesitate to return to the emergency department immediately.

## 2022-10-21 NOTE — ED TRIAGE NOTES
Pt. Arrives to the ED via EMS from dialysis endorsing midsternal chest pain. Pt. Had about an hour left on the machine. Pt. Reports having some vomiting and not feeling well prior to be dialysized. Pt. Seen here 3 days ago for same complaint.

## 2022-10-31 NOTE — H&P (VIEW-ONLY)
Randee Jarred  1945           ICD-10-CM ICD-9-CM    1. Stricture of right ureter  L32.5 774.8 METABOLIC PANEL, BASIC      CBC W/O DIFF      2. Recurrent UTI  N39.0 599.0 CULTURE, URINE      METABOLIC PANEL, BASIC      CBC W/O DIFF      AMB POC URINALYSIS DIP STICK AUTO W/O MICRO      3. Pyuria  P33.51 726.8 METABOLIC PANEL, BASIC      CBC W/O DIFF            ASSESSMENT:  UA today: trace glucose, 3+ blood, 3+ protein, 1+ leuks, urine is dark brown    1. Right distal ureteral stricture              Previously with NephU catheter now converted to [address] [place] stent. Last stent exchange 4/12/22 by Dr. Edda Kohler. 2. ESRD on HD   3. UTI / Dysuria/ Suprapubic pressure / Gross hematuria    4. Obesity - Body mass index is 36.95 kg/m². 5. DM 2  6. Pelvic Fracture 11/2021             No hx of repair, managed by ortho. Still makes too much urine to pull stent. Continue with routine stent exchange. Consider once becomes anuric to pull stent in setting of sterile urine with instillation of gentamicin into collecting system. Follow-up and Dispositions    Return for RTC post-op. PLAN:  Continue behavioral modifications for prevention of UTIs. Literature given previously  Continue Estrogen cream. Refills as needed. Urine sent for culture. CBC and BMP drawn today  Message sent to Dr. Edda Kohler due to patient's recent ER visits and still feeling ill. Plan for right cystoscopy and JJ stent exchange on 11/8/22. RTC post-op. DISCUSSION:  Body mass index is 36.95 kg/m². Patient's BMI is out of the normal parameters. Information about BMI was given and patient was advised to follow-up with their PCP for further management. Recurrent UTI -   Discussed behavioral modifications for prevention of UTIs including increasing fluid intake, timed voiding, and use of cranberry and D-mannose supplements. Discussed a bowel regiment using Miralax and Metamucil for soft stools.  Also discussed use of a squatty potty for bowel movements. Discussed use of vaginal estrogen. No history of breast, ovarian, or uterine cancer. Advised patient to eat yogurt or supplement with a probiotic daily. CC: Ureteral stricture     HISTORY OF PRESENT ILLNESS: Nilton Batista is a 76 y.o. female who presents today for pre-op right JJ stent exchange on 11/8/22. Established patient of Dr. Garth Foster. Patient has a history of ESRD on HD, still oliguric, with right ureteral stricture. Previously managed by nephrostomy tube since 2018 and is s/p multiple stone surgeries in the past with a history of stent migration. She had the tube changed to a nephroureteral catheter on 7/17/2020. Patient was bothered by bag, and converted to 8F JJ stent without issues. Last stent exchanged 4/12/22 by Dr. Garth Foster. Patient recently seen in ER 10/21, 10/19, 8/18 for chest pain. Ultimately diagnosed with hyperkalemia or atypical chest pain. Patient notes she has been very sick lately with low BP and AFIB. She is on metoprolol, amiodarone, and digoxin. She notes she has been vomiting and feeling like she is going to faint. She notes she had to cancel her appt last week because she couldn't walk out to the car. Patient notes she doesn't urinate on dialysis days, but urinates a few times a day on days she doesn't have dialysis. Patient previously reported several UTIs in the last year. Started on routine UTI prevention regimen, but has not started cranberry or D-mannose. Continues with topical estrogen three times a week. Patient reports she may have UTI today. Notes mild/intermitent dysuria, occasional nausea, and suprapubic pain x1 month. Denies gross hematuria, flank pain, or F/C/V. Patient no longer routinely voids, but is making enough urine daily with urinary incontinence when she coughs, stands, or laughs. 1-2 ppd. She notes continued stent discomfort that is manageable. No other urinary complaints at this time.      Patient with recent ED visit 3/3/2022 secondary to fall. CT revealed incomplete healing of left sacral ala. Prior pelvic fracture 2/2 to another fall in 11/2021. This was not surgically repaired, but patient underwent a long course with . Currently being managed by ortho. REVIEW OF LABS & IMAGING:  CT A/P 7/15/2020  4/2020 revealing dislodged nephrostomy tube and hyronephrosis. IMPRESSION:   1. Stable right nephroureterostomy without increasing hydronephrosis on the  Stone. 2. No secondary evidence of mass, bowel obstruction, ascites, hernia, or focal  inflammatory process. 3. Bilateral renal cystic disease. 4. Sigmoid diverticulosis without diverticulitis. Review of Systems  Constitutional: Fever:   Skin: Rash: HEENT: Hearing difficulty:   Eyes: Blurred vision:   Cardiovascular: Chest pain:   Respiratory: Shortness of breath:   Gastrointestinal: Nausea/vomiting:   Musculoskeletal: Back pain:   Neurological: Weakness:   Psychological: Memory loss:   Comments/additional findings:         Past Medical History:   Diagnosis Date    Anemia in end-stage renal disease ([facility]     Anticoagulated by anticoagulation treatment     On Apixaban    Chronic hypotension     On Midodrine    Chronic pain     Closed fracture of left inferior pubic ramus, with routine healing, subsequent encounter 11/12/2021    Closed fracture of superior pubic ramus, left, with routine healing, subsequent encounter 11/12/2021    Closed nondisplaced fracture of anterior wall of left acetabulum with routine healing 11/12/2021    Depression     End-stage renal disease on hemodialysis ([facility] [address]     [facility] [address]  [facility]  Tel # [phone]    Gastroesophageal reflux disease     Glaucoma     History of acute pyelonephritis 02/20/2020    History of hydronephrosis 10/05/2021    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    Urine culture (collected 10/8/2021, resulted 10/14/2021) yielded growth of >100,000 colonies/ml of Enterococcus faecalis RESISTANT to Ciprofloxacin, Levofloxacin, Tetracycline and Vancomycin    History of kidney stones     History of recurrent urinary tract infection     History of sepsis 06/18/2021    History of septic shock 10/08/2021    History of urethral stricture     History of urinary tract infection 10/08/2021    Urine culture (collected 10/8/2021, resulted 10/14/2021) yielded growth of >100,000 colonies/ml of Enterococcus faecalis RESISTANT to Ciprofloxacin, Levofloxacin, Tetracycline and Vancomycin    Hyperlipidemia     Hyperphosphatemia 11/14/2021    Hypothyroidism     Lung mass     Mononeuropathy     Involving ring finger of left hand    Need for prophylactic isolation 10/08/2021    Urine culture (collected 10/8/2021, resulted 10/14/2021) yielded growth of >100,000 colonies/ml of Enterococcus faecalis RESISTANT to Ciprofloxacin, Levofloxacin, Tetracycline and Vancomycin    Osteoporosis     Paroxysmal atrial fibrillation (HCC)     Secondary hyperparathyroidism of renal origin ([facility] [address]     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 ([facility] [address]     HbA1c (10/8/2021) = 4.6    Uric acid nephrolithiasis     Urinary incontinence        Past Surgical History:   Procedure Laterality Date    HX APPENDECTOMY      HX CHOLECYSTECTOMY      HX GASTRIC BYPASS      Gastric stapling    HX KNEE ARTHROSCOPY      HX UROLOGICAL      right PCN placement    HX UROLOGICAL  07/23/2018    RIGHT URETEROSCOPY WITH HOLMIUM LASER    IR EXCHANGE NEPHRO PERC LT SI  2/21/2020    IR EXCHANGE NEPHRO PERC RT SI  4/13/2020    IR EXCHANGE NEPHRO PERC RT SI  7/17/2020    IR NEPHROSTOMY PERC RT PLC CATH  SI  10/14/2020    IR NEPHROURETERAL PERC RT PLC CATH NEW ACCESS  SI  4/30/2020    TN INTRO CATH DIALYSIS CIRCUIT DX ANGRPH FLUOR S&I Left 9/24/2020    FISTULOGRAM LEFT/poss permanent catheter placement performed by Bruce Cheung MD at [facility] CATH LAB    VASCULAR SURGERY PROCEDURE UNLIST      lef AVF       Allergies   Allergen Reactions    Albumin, Human 25 % Itching     Headache - severe migraine like, itchy eyes, runny nose    Ciprofloxacin Hives    Cyclopentolate Unknown (comments)    Iron Sucrose Diarrhea    Statins-Hmg-Coa Reductase Inhibitors Other (comments)     Body ache       Current Outpatient Medications   Medication Sig    aspirin delayed-release 81 mg tablet Take 1 Tablet by mouth daily. clopidogreL (Plavix) 75 mg tab Take 1 Tablet by mouth in the morning.    gabapentin (NEURONTIN) 100 mg capsule Take 1 Capsule by mouth nightly. Max Daily Amount: 100 mg. Indications: concern for back pain post dialysis    melatonin 5 mg tablet Take 5 mg by mouth nightly. HYDROmorphone (DILAUDID) 2 mg tablet Take 1 mg by mouth every eight (8) hours as needed for Pain. Take 1/2 tablet by mouth every 8 hours as needed for pain level of 5 out of 10 or greater (Patient not taking: No sig reported)    allopurinoL (ZYLOPRIM) 100 mg tablet Take 1 Tablet by mouth every Monday, Wednesday, Friday. [after hemodialysis]  Indications: treatment to prevent acute gout attack    amiodarone (CORDARONE) 200 mg tablet Take 1 Tablet by mouth daily. Indications: prevention of recurrent atrial fibrillation    sevelamer carbonate (RENVELA) 800 mg tab tab Take 2 Tablets by mouth three (3) times daily (with meals). Indications: renal osteodystrophy with hyperphosphatemia    acetaminophen (Tylenol Extra Strength) 500 mg tablet Take 1 Tablet by mouth every six (6) hours as needed for Pain.    lidocaine (LIDODERM) 5 % Apply patch to the affected area for 12 hours a day and remove for 12 hours a day. (Patient not taking: Reported on 8/18/2022)    meclizine (ANTIVERT) 25 mg tablet Take 25 mg by mouth three (3) times daily as needed for Dizziness. (Patient not taking: Reported on 8/18/2022)    DULoxetine (CYMBALTA) 20 mg capsule Take 20 mg by mouth daily.     estradioL (Estrace) 0.01 % (0.1 mg/gram) vaginal cream Apply a fingertip amount around the urethra three times a week. (Patient not taking: Reported on 8/30/2022)    biotin 1,000 mcg chew Take 1 Tab by mouth daily. cyanocobalamin 1,000 mcg tablet Take 1,000 mcg by mouth daily. (Patient not taking: Reported on 8/30/2022)    lactobacillus sp. 50 billion cpu (BIO-K PLUS) 50 billion cell -375 mg cap capsule Take 1 Cap by mouth daily. ascorbic acid, vitamin C, (VITAMIN C) 500 mg tablet Take 500 mg by mouth daily. cholecalciferol (VITAMIN D3) (2,000 UNITS /50 MCG) cap capsule Take 2,000 Units by mouth two (2) times a day. latanoprost (XALATAN) 0.005 % ophthalmic solution Administer 1 Drop to both eyes nightly. One drop at bedtime    levothyroxine (SYNTHROID) 125 mcg tablet Take 125 mcg by mouth Daily (before breakfast). omeprazole (PRILOSEC) 20 mg capsule Take 20 mg by mouth daily. ondansetron (ZOFRAN ODT) 4 mg disintegrating tablet Take 4 mg by mouth every eight (8) hours as needed for Nausea or Vomiting. (Patient not taking: Reported on 8/18/2022)    vit B Cmplx 3-FA-Vit C-Biotin (NEPHRO HOWIE RX) 1- mg-mg-mcg tablet Take 1 Tab by mouth daily. No current facility-administered medications for this visit. Family History   Problem Relation Age of Onset    Heart Surgery Sister        Social History     Socioeconomic History    Marital status: SINGLE   Tobacco Use    Smoking status: Never    Smokeless tobacco: Never   Vaping Use    Vaping Use: Never used   Substance and Sexual Activity    Alcohol use: Never    Drug use: Never         PHYSICAL EXAMINATION:   Visit Vitals  Wt 202 lb (91.6 kg)   BMI 36.95 kg/m²       Constitutional: Well developed, well-nourished female in no acute distress. CV:  No peripheral swelling noted  Respiratory: No respiratory distress or difficulties   Female:  No abdominal tenderness. No CVA tenderness. Skin:  Normal color. No evidence of jaundice. Neuro/Psych:  Patient with appropriate affect. Alert and oriented.     Lymphatic:   No enlargement of supraclavicular lymph nodes. Juan David Vasquez PA-C  Urology of [address] [facility], [address] [facility]  [place], [address]  P: [phone]   F: [phone]        ICD-10-CM ICD-9-CM    1. Stricture of right ureter  Q49.6 387.9 METABOLIC PANEL, BASIC      CBC W/O DIFF      2. Recurrent UTI  N39.0 599.0 CULTURE, URINE      METABOLIC PANEL, BASIC      CBC W/O DIFF      AMB POC URINALYSIS DIP STICK AUTO W/O MICRO      3.  Pyuria  C53.62 570.9 METABOLIC PANEL, BASIC      CBC W/O DIFF

## 2022-11-03 NOTE — PERIOP NOTES
PRE-SURGICAL INSTRUCTIONS        Patient's Name:  Hailey Anderson Date:  11/3/2022                Surgery Date:  11/8/2022                Do NOT eat or drink anything, including candy, gum, or ice chips after midnight on 11/8/2022, unless you have specific instructions from your surgeon or anesthesia provider to do so. You may brush your teeth before coming to the hospital.  No smoking 24 hours prior to the day of surgery. No alcohol 24 hours prior to the day of surgery. No recreational drugs for one week prior to the day of surgery. Leave all valuables, including money/purse, at home. Remove all jewelry, nail polish, acrylic nails, and makeup (including mascara); no lotions powders, deodorant, or perfume/cologne/after shave on the skin. Follow instruction for Hibiclens washes and CHG wipes from surgeon's office. Glasses/contact lenses and dentures may be worn to the hospital.  They will be removed prior to surgery. Call your doctor if symptoms of a cold or illness develop within 24-48 hours prior to your surgery. 11.  If you are having an outpatient procedure, please make arrangements for a responsible ADULT TO [address] [place] [address] and stay with you for 24 hours after your surgery. 12. ONE VISITOR in the hospital at this time for outpatient procedures. Exceptions may be made for surgical admissions, per nursing unit guidelines      Special Instructions:      Bring list of CURRENT medications. Bring any pertinent legal medical records. Take these medications the morning of surgery with a sip of water:  per surgeons instructions  Follow physician instructions about insulin. Follow physician instructions about stopping anticoagulants. Plavix/ASA        On the day of surgery, come in the main entrance of [facility]. Let the  at the desk know you are there for surgery. A staff member will come escort you to the surgical area on the second floor.     If you have any questions or concerns, please do not hesitate to call:     (Prior to the day of surgery) PAT department:  [phone]   (Day of surgery) Pre-Op department:  [phone]    These surgical instructions were reviewed with [facility] during the [facility] phone call.

## 2022-11-08 PROBLEM — N13.5 URETERAL STRICTURE: Status: ACTIVE | Noted: 2022-01-01

## 2022-11-08 NOTE — INTERVAL H&P NOTE
Update History & Physical    The Patient's History and Physical of November 1,   Progress was reviewed with the patient and I examined the patient. There was no change. The surgical site was confirmed by the patient and me. Plan:  The risk, benefits, expected outcome, and alternative to the recommended procedure have been discussed with the patient. Patient understands and wants to proceed with the procedure.     Electronically signed by Alan Newman MD on 11/8/2022 at 1:32 PM

## 2022-11-08 NOTE — ANESTHESIA POSTPROCEDURE EVALUATION
Procedure(s):  CYSTOSCOPY/RIGHT DOUBLE J STENT EXCHANGE/C-ARM. MAC    Anesthesia Post Evaluation      Multimodal analgesia: multimodal analgesia used between 6 hours prior to anesthesia start to PACU discharge  Patient location during evaluation: bedside  Patient participation: complete - patient participated  Level of consciousness: awake  Pain management: adequate  Airway patency: patent  Anesthetic complications: no  Cardiovascular status: stable  Respiratory status: acceptable  Hydration status: acceptable  Post anesthesia nausea and vomiting:  controlled      INITIAL Post-op Vital signs:   Vitals Value Taken Time   BP 91/43 11/08/22 1630   Temp 37.1 °C (98.7 °F) 11/08/22 1542   Pulse 68 11/08/22 1632   Resp 17 11/08/22 1632   SpO2 98 % 11/08/22 1632   Vitals shown include unvalidated device data.

## 2022-11-08 NOTE — ANESTHESIA PREPROCEDURE EVALUATION
Relevant Problems   No relevant active problems       Anesthetic History   No history of anesthetic complications            Review of Systems / Medical History  Patient summary reviewed and pertinent labs reviewed    Pulmonary  Within defined limits                 Neuro/Psych         Psychiatric history     Cardiovascular            Dysrhythmias       Exercise tolerance: <4 METS     GI/Hepatic/Renal         Renal disease: stones       Endo/Other    Diabetes: well controlled, type 2  Hypothyroidism: well controlled  Arthritis     Other Findings              Physical Exam    Airway  Mallampati: III  TM Distance: 4 - 6 cm  Neck ROM: decreased range of motion   Mouth opening: Diminished (comment)     Cardiovascular    Rhythm: regular  Rate: normal         Dental    Dentition: Lower partial plate and Upper partial plate     Pulmonary  Breath sounds clear to auscultation               Abdominal  GI exam deferred       Other Findings            Anesthetic Plan    ASA: 3  Anesthesia type: MAC          Induction: Intravenous  Anesthetic plan and risks discussed with: Patient

## 2022-11-08 NOTE — PERIOP NOTES
Several Unsuccessful attempt to Collect Lab-specimen, Patient Urgent for surgery, Per Dr. Nitin Middleton no BMP needed for surgery

## 2022-11-08 NOTE — DISCHARGE INSTRUCTIONS
Discharge Instructions          ADDITIONAL INFORMATION:   You have indwelling ureteral stents which frequently causes slight discomfort in the flank region and bladder spasms. If you are bothered by these symptoms, please contact our office and we can presribe you flomax as needed for flank discomfort or Ditropan for bladder spasms. The indwelling stent will need to be removed/exchanged as directed below. If the stent is left in place without appropriate follow-up, it may become encrusted with stone and/or infected. If that occurs, you will require multiple additional surgeries to fix this. It is very important you follow up for appropriate removal or exchange of ureteral stents. If you experience any fevers > 100.4, significant nausea/vomiting, or significant worsening of pain, please contact us at the number below. It is common to experience mild, recurrent blood in your urine and this is likely due to stent irritation. If the bleeding continues to worsen with passage of clots, you are unable to urinate, or you experience significant light-headedness, please contact us at the number below. FOLLOW UP CARE:  You have a return appointment with Dr. Adelaide Nieto in 6 months for next stent change    DISCHARGE SUMMARY from Nurse    PATIENT INSTRUCTIONS:    After general anesthesia or intravenous sedation, for 24 hours or while taking prescription Narcotics:  Limit your activities  Do not drive and operate hazardous machinery  Do not make important personal or business decisions  Do  not drink alcoholic beverages  If you have not urinated within 8 hours after discharge, please contact your surgeon on call.     Report the following to your surgeon:  Excessive pain, swelling, redness or odor of or around the surgical area  Temperature over 100.5  Nausea and vomiting lasting longer than 4 hours or if unable to take medications  Any signs of decreased circulation or nerve impairment to extremity: change in color, persistent  numbness, tingling, coldness or increase pain  Any questions    What to do at Home:  Recommended activity: {discharge activity:23278}, ***    If you experience any of the following symptoms ***, please follow up with ***. *  Please give a list of your current medications to your Primary Care Provider. *  Please update this list whenever your medications are discontinued, doses are      changed, or new medications (including over-the-counter products) are added. *  Please carry medication information at all times in case of emergency situations. These are general instructions for a healthy lifestyle:    No smoking/ No tobacco products/ Avoid exposure to second hand smoke  Surgeon General's Warning:  Quitting smoking now greatly reduces serious risk to your health. Obesity, smoking, and sedentary lifestyle greatly increases your risk for illness    A healthy diet, regular physical exercise & weight monitoring are important for maintaining a healthy lifestyle    You may be retaining fluid if you have a history of heart failure or if you experience any of the following symptoms:  Weight gain of 3 pounds or more overnight or 5 pounds in a week, increased swelling in our hands or feet or shortness of breath while lying flat in bed. Please call your doctor as soon as you notice any of these symptoms; do not wait until your next office visit. The discharge information has been reviewed with the {PATIENT PARENT GUARDIAN:76907}. The {PATIENT PARENT GUARDIAN:71356} verbalized understanding. Discharge medications reviewed with the {Carola meds reviewed JHXV:18062} and appropriate educational materials and side effects teaching were provided. ___________________________________________________________________________________________________________________________________     Learning About Ureteral Stents  What is a ureteral stent?      Normally, urine passes from the kidneys to the bladder through a tube called the ureter. But sometimes the ureter can be blocked. The blockage may be caused by problems such as a kidney stone, a tumor, or an infection. A ureteral (say \"you-REE-ter-\") stent is a thin, hollow tube that is placed in the ureter to help urine pass from the kidney into the bladder. The stent keeps the ureter open. After the stent is placed, urine should flow better from your kidneys to your bladder. The stent may be left in place for several days. Or you may need it for several months. Your doctor will take it out when you no longer need it. Or, in some cases, it may be taken out at home. You may also need to have the stent replaced. How is a ureteral stent placed? The stent will be placed during a procedure in an operating room. You will get medicine to prevent pain during the procedure. You may also get medicine to make you sleep. You will probably be able to go home the same day, but you might need to stay overnight. The doctor will place the stent by guiding it up the urethra. The urethra is the tube that carries urine from the bladder to outside the body. Then the doctor will pass the stent through the bladder and ureter into the kidney. The doctor will place one end of the stent in the kidney and the other end in the bladder. What can you expect after placement? After the placement, you may have a small amount of blood in your urine for 1 to 3 days. While the stent is in place, you may have to urinate more often, feel a sudden need to urinate, or feel like you can't completely empty your bladder. You may feel some pain when you urinate or do strenuous activity. You also may notice a small amount of blood in your urine after strenuous activities. These side effects usually don't prevent people from doing their normal daily activities. You may have a thin string coming out of your urethra. This string is attached to the stent. Try not to pull on the string.  It will be used to pull out the stent when you no longer need it. How is it removed? There are several ways to remove the stent. If it has been in place for a while, you may have an X-ray to see if the stent is smooth or if it has become lined with a crust. This crust can make it harder to remove the stent. Some stents can be removed with an attached string that comes out of your urethra. In some cases, if your doctor recommends it, you will be able to remove the stent at home. Or your doctor will remove it with the string in the doctor's office or hospital.  If there is no string, you will need to have a procedure to remove the stent. It's done using a thin, lighted tube called a cystoscope, or scope. The doctor inserts the scope into your urethra and on into the bladder. The scope allows the doctor to check areas of your bladder and urethra that usually don't show up well on X-rays. Your doctor can also insert tiny tools through the scope to remove the stent. You may get medicine that relaxes you or puts you in a light sleep. The area where the scope is inserted may be numb. You will probably be able to go home the same day. What can you expect after removal?  After the stent removal, you may need to urinate often. You may have some burning during and after urination for a day or two. It may help to drink lots of fluids (unless your doctor tells you not to). This also helps prevent a urinary tract infection. Slightly pink urine is common for several days after removal.  Ask your doctor about medicines for pain. Follow-up care is a key part of your treatment and safety. Be sure to make and go to all appointments, and call your doctor if you are having problems. It's also a good idea to know your test results and keep a list of the medicines you take. Current as of: June 16, 2022               Content Version: 13.4  © 2006-2022 [employer].    Care instructions adapted under license by [employer] (which disclaims liability or warranty for this information). If you have questions about a medical condition or this instruction, always ask your healthcare professional. [address] any warranty or liability for your use of this information.

## 2022-11-08 NOTE — OP NOTES
Operative Note  Patient: Kip Barnes               Sex: female             MRN: [id number]  YOB: 1945      Age:  76 y.o. Preoperative Diagnosis: N39.9 URETERAL STRICTURE  Postoperative Diagnosis:  N39.9 URETERAL STRICTURE  Surgeon: Jovanny Vences   Preoperative Diagnosis: Hydronephrosis, unspecified hydronephrosis type [N13.30]  Postoperative Diagnosis:  Hydronephrosis, unspecified hydronephrosis type [N13.30]  Surgeon: Jovanny Vences      This is a 75 y/o woman ESRD on HD still with significant urine production with right distal ureteral stricture managed with chronic stent last changed 6 months here today for cysto, stent exchange. Preop culture negative. Procedure:    1) Cystoscopy  2) Retrograde pyelogram with interpretation  3) Right Ureteral stent exchange      Findings:    1). Cloudy urine in bladder, sent for culture   2). Retrograde pyelogram revealed moderate right hydro   3). 8x24 JJ Ureteral stent exchange without complication      Narrative of Events: The patient was brought to the operative suite. MAC was induced and preoperative antibiotics were administered. They were then placed in the dorsal lithotomy position and their external genitalia was prepped and draped in the usual fashion. A surgical timeout was performed confirming the patient's name, date of birth, laterality, and antibiotics. All were in agreement. A 22 Prydeinig cystourethroscope was then inserted into the patients bladder. Stent was not encrusted. Stent was grasped and kit out to the meatus. Wire advanced and stent removed. New 8x24 JJ stent placed with good culrs in bladder and renal pelvis. The bladder was drained and the patient was then awoken and transferred to the recovery room in stable condition. Estimated Blood Loss: <5CC      Anesthesia:  MAC                      Implants:   Implant Name Type Inv.  Item Serial No.  Lot No. LRB No. Used Action   STENT URET 8FR L24CM PERCFLX HYDR+ DBL PGTL THRD 2 - [id number]  STENT URET 8FR L24CM PERCFLX HYDR+ DBL PGTL THRD 2  [employer] [id number] Right 1 Implanted       Specimens: * No specimens in log *     Drains: None           Complications:  None           Plan:  Repeat stent change vs. Removal in 6 months     Olga Villarreal MD  11/8/2022

## 2022-11-14 PROBLEM — R00.1 BRADYCARDIA: Status: ACTIVE | Noted: 2022-01-01

## 2022-11-14 PROBLEM — I95.9 HYPOTENSION: Status: ACTIVE | Noted: 2022-01-01

## 2022-11-14 NOTE — ED NOTES
EMERGENCY DEPARTMENT HISTORY AND PHYSICAL EXAM      Date: 11/14/2022  Patient Name: Benson Donohue      History of Presenting Illness     Chief Complaint   Patient presents with    Dizziness    Fatigue    Shortness of Breath       History Provided By: Patient and Patient's Son    Location/Duration/Severity/Modifying factors   Benson Donohue is a 77 y/o female with a PMHx significant for atrial fibrillation, end-stage renal disease on hemodialysis Monday Wednesday and Friday, hypothyroidism, history of hyperkalemia, chronic hypotension on midodrine, and diabetes mellitus 2 presenting for evaluation of chest pain radiating to back and weakness after inability to complete 4 hours of dialysis. She completed about 3 hours before the line fell out of her arm and she began to bleed. Patient's son is at bedside to supplement patient's history. Patient notes that she has been having nausea, vomiting, and diarrhea since about July; however, she has been feeling weak since starting digoxin October 19, 2022. At that time patient was told to stop taking amiodarone. She has had episodes of of her legs just giving out on her and stumbling and falling. Patient has not hit her head with these falls but has been able to grab onto a chair to help her self back up. She has history of atrial fibrillation; however, since about August when her watchman device was reevaluated it was noted that patient has had sinus bradycardia. On August 15, 2022 when the watchman device was evaluated with a CATINA, patient was told to switch to Plavix and aspirin and stop Eliquis because the watchman was in good position without thrombus or leak. Dizziness   Associated symptoms include chest pain, dizziness and weakness. Pertinent negatives include no confusion, no fever, no abdominal pain, no nausea, no vomiting, no congestion and no light-headedness. Her past medical history does not include no syncope.    Fatigue  Associated symptoms include chest pain and shortness of breath. Pertinent negatives include no abdominal pain. Shortness of Breath  Associated symptoms include chest pain. Pertinent negatives include no fever, no rhinorrhea, no sore throat, no cough, no vomiting, no abdominal pain and no rash. There are no other complaints, changes, or physical findings at this time. PCP: Kathy Khalil MD    Current Facility-Administered Medications   Medication Dose Route Frequency Provider Last Rate Last Admin    sodium chloride 0.9 % bolus infusion 250 mL  250 mL IntraVENous ONCE TrevorVioleta MD         Current Outpatient Medications   Medication Sig Dispense Refill    metoprolol tartrate (LOPRESSOR) 25 mg tablet TAKE 1/2 (ONE-HALF) TABLET BY MOUTH TWICE DAILY      digoxin (LANOXIN) 0.125 mg tablet TAKE 1 TABLET BY MOUTH EVERY OTHER DAY (NON DIALYSIS DAYS)      aspirin delayed-release 81 mg tablet Take 1 Tablet by mouth daily. 30 Tablet 5    clopidogreL (Plavix) 75 mg tab Take 1 Tablet by mouth in the morning. 30 Tablet 4    gabapentin (NEURONTIN) 100 mg capsule Take 1 Capsule by mouth nightly. Max Daily Amount: 100 mg. Indications: concern for back pain post dialysis 30 Capsule 0    melatonin 5 mg tablet Take 5 mg by mouth nightly. HYDROmorphone (DILAUDID) 2 mg tablet Take 1 mg by mouth every eight (8) hours as needed for Pain. Take 1/2 tablet by mouth every 8 hours as needed for pain level of 5 out of 10 or greater      allopurinoL (ZYLOPRIM) 100 mg tablet Take 1 Tablet by mouth every Monday, Wednesday, Friday. [after hemodialysis]  Indications: treatment to prevent acute gout attack 12 Tablet 0    amiodarone (CORDARONE) 200 mg tablet Take 1 Tablet by mouth daily. Indications: prevention of recurrent atrial fibrillation 30 Tablet 0    sevelamer carbonate (RENVELA) 800 mg tab tab Take 2 Tablets by mouth three (3) times daily (with meals).  Indications: renal osteodystrophy with hyperphosphatemia 180 Tablet 0    acetaminophen (Tylenol Extra Strength) 500 mg tablet Take 1 Tablet by mouth every six (6) hours as needed for Pain. 30 Tablet 0    meclizine (ANTIVERT) 25 mg tablet Take 25 mg by mouth three (3) times daily as needed for Dizziness. DULoxetine (CYMBALTA) 20 mg capsule Take 20 mg by mouth daily. estradioL (Estrace) 0.01 % (0.1 mg/gram) vaginal cream Apply a fingertip amount around the urethra three times a week. 30 g 3    biotin 1,000 mcg chew Take 1 Tab by mouth daily. cyanocobalamin 1,000 mcg tablet Take 1,000 mcg by mouth daily. lactobacillus sp. 50 billion cpu (BIO-K PLUS) 50 billion cell -375 mg cap capsule Take 1 Cap by mouth daily. 30 Cap 2    ascorbic acid, vitamin C, (VITAMIN C) 500 mg tablet Take 500 mg by mouth daily. cholecalciferol (VITAMIN D3) (2,000 UNITS /50 MCG) cap capsule Take 2,000 Units by mouth two (2) times a day. latanoprost (XALATAN) 0.005 % ophthalmic solution Administer 1 Drop to both eyes nightly. One drop at bedtime      levothyroxine (SYNTHROID) 125 mcg tablet Take 125 mcg by mouth Daily (before breakfast). omeprazole (PRILOSEC) 20 mg capsule Take 20 mg by mouth daily. ondansetron (ZOFRAN ODT) 4 mg disintegrating tablet Take 4 mg by mouth every eight (8) hours as needed for Nausea or Vomiting. vit B Cmplx 3-FA-Vit C-Biotin (NEPHRO HOWIE RX) 1- mg-mg-mcg tablet Take 1 Tab by mouth daily.          Past History     Past Medical History:  Past Medical History:   Diagnosis Date    Anemia in end-stage renal disease ([facility] [address]     Anticoagulated by anticoagulation treatment     On Apixaban    Chronic hypotension     On Midodrine    Chronic kidney disease     ESRD on HD MWF    Chronic pain     Closed fracture of left inferior pubic ramus, with routine healing, subsequent encounter 11/12/2021    Closed fracture of superior pubic ramus, left, with routine healing, subsequent encounter 11/12/2021    Closed nondisplaced fracture of anterior wall of left acetabulum with routine healing 11/12/2021    Depression     End-stage renal disease on hemodialysis ([facility] [address]     HD at [address] [facility] on MWF.  Tel # [phone]    Gastroesophageal reflux disease     Glaucoma     History of acute pyelonephritis 02/20/2020    History of hydronephrosis 10/05/2021    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    Urine culture (collected 10/8/2021, resulted 10/14/2021) yielded growth of >100,000 colonies/ml of Enterococcus faecalis RESISTANT to Ciprofloxacin, Levofloxacin, Tetracycline and Vancomycin    History of kidney stones     History of recurrent urinary tract infection     History of sepsis 06/18/2021    History of septic shock 10/08/2021    History of urethral stricture     History of urinary tract infection 10/08/2021    Urine culture (collected 10/8/2021, resulted 10/14/2021) yielded growth of >100,000 colonies/ml of Enterococcus faecalis RESISTANT to Ciprofloxacin, Levofloxacin, Tetracycline and Vancomycin    Hyperlipidemia     Hyperphosphatemia 11/14/2021    Hypothyroidism     Lung mass     Mononeuropathy     Involving ring finger of left hand    Need for prophylactic isolation 10/08/2021    Urine culture (collected 10/8/2021, resulted 10/14/2021) yielded growth of >100,000 colonies/ml of Enterococcus faecalis RESISTANT to Ciprofloxacin, Levofloxacin, Tetracycline and Vancomycin    Osteoporosis     Paroxysmal atrial fibrillation (HCC)     Secondary hyperparathyroidism of renal origin ([facility] [address]     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 ([facility] [address]     HbA1c (10/8/2021) = 4.6    Uric acid nephrolithiasis     Urinary incontinence        Past Surgical History:  Past Surgical History:   Procedure Laterality Date    HX APPENDECTOMY      HX CHOLECYSTECTOMY      HX GASTRIC BYPASS      Gastric stapling    HX KNEE ARTHROSCOPY      HX UROLOGICAL      right PCN placement    HX UROLOGICAL  07/23/2018    RIGHT URETEROSCOPY WITH HOLMIUM LASER    IR EXCHANGE NEPHRO PERC LT SI 2/21/2020    IR EXCHANGE NEPHRO PERC RT SI  4/13/2020    IR EXCHANGE NEPHRO PERC RT SI  7/17/2020    IR NEPHROSTOMY PERC RT PLC CATH  SI  10/14/2020    IR NEPHROURETERAL PERC RT PLC CATH NEW ACCESS  SI  4/30/2020    TN INTRO CATH DIALYSIS CIRCUIT DX ANGRPH FLUOR S&I Left 9/24/2020    FISTULOGRAM LEFT/poss permanent catheter placement performed by Renetta Balbuena MD at [facility] CATH LAB    VASCULAR SURGERY PROCEDURE UNLIST      lef AVF       Family History:  Family History   Problem Relation Age of Onset    Heart Surgery Sister        Social History:  Social History     Tobacco Use    Smoking status: Never    Smokeless tobacco: Never   Vaping Use    Vaping Use: Never used   Substance Use Topics    Alcohol use: Never    Drug use: Never       Allergies: Allergies   Allergen Reactions    Albumin, Human 25 % Itching     Headache - severe migraine like, itchy eyes, runny nose    Ciprofloxacin Hives    Cyclopentolate Unknown (comments)    Iron Sucrose Diarrhea    Statins-Hmg-Coa Reductase Inhibitors Other (comments)     Body ache         Review of Systems     Review of Systems   Constitutional:  Positive for fatigue. Negative for chills and fever. HENT:  Negative for congestion, hearing loss, rhinorrhea and sore throat. Eyes:  Negative for visual disturbance. Respiratory:  Positive for shortness of breath. Negative for cough. Cardiovascular:  Positive for chest pain. Gastrointestinal:  Positive for diarrhea. Negative for abdominal pain, nausea and vomiting. Genitourinary:  Negative for dysuria, frequency and hematuria. Musculoskeletal:  Negative for gait problem. Skin:  Negative for rash. Neurological:  Positive for dizziness and weakness. Negative for syncope and light-headedness. Psychiatric/Behavioral:  Negative for confusion. Physical Exam     Physical Exam  Vitals and nursing note reviewed. Constitutional:       General: She is not in acute distress.      Comments: Pt in Trendelenburg position. HENT:      Head: Normocephalic and atraumatic. Right Ear: External ear normal.      Left Ear: External ear normal.      Nose: Nose normal.      Mouth/Throat:      Mouth: Mucous membranes are moist.      Pharynx: Oropharynx is clear. Eyes:      Extraocular Movements: Extraocular movements intact. Conjunctiva/sclera: Conjunctivae normal.   Cardiovascular:      Rate and Rhythm: Regular rhythm. Bradycardia present. Pulses: Normal pulses. Heart sounds: Normal heart sounds. Pulmonary:      Effort: Pulmonary effort is normal. No respiratory distress. Breath sounds: Normal breath sounds. Chest:      Chest wall: No tenderness. Abdominal:      General: There is no distension. Palpations: Abdomen is soft. Tenderness: There is no abdominal tenderness. There is no guarding or rebound. Musculoskeletal:         General: No swelling or tenderness. Normal range of motion. Cervical back: Normal range of motion. Right lower leg: No tenderness. No edema. Left lower leg: No tenderness. No edema. Skin:     General: Skin is warm and dry. Neurological:      General: No focal deficit present. Mental Status: She is alert and oriented to person, place, and time. Mental status is at baseline.    Psychiatric:         Mood and Affect: Mood normal.         Behavior: Behavior normal.       Lab and Diagnostic Study Results     Labs -  Recent Results (from the past 24 hour(s))   CBC WITH AUTOMATED DIFF    Collection Time: 11/14/22  1:30 PM   Result Value Ref Range    WBC 8.9 4.6 - 13.2 K/uL    RBC 3.12 (L) 4.20 - 5.30 M/uL    HGB 10.4 (L) 12.0 - 16.0 g/dL    HCT 32.3 (L) 35.0 - 45.0 %    .5 (H) 78.0 - 100.0 FL    MCH 33.3 24.0 - 34.0 PG    MCHC 32.2 31.0 - 37.0 g/dL    RDW 14.7 (H) 11.6 - 14.5 %    PLATELET 695 516 - 268 K/uL    MPV 10.2 9.2 - 11.8 FL    NRBC 0.6 (H) 0  WBC    ABSOLUTE NRBC 0.05 (H) 0.00 - 0.01 K/uL    NEUTROPHILS 63 40 - 73 % LYMPHOCYTES 21 21 - 52 %    MONOCYTES 12 (H) 3 - 10 %    EOSINOPHILS 1 0 - 5 %    BASOPHILS 1 0 - 2 %    IMMATURE GRANULOCYTES 3 (H) 0.0 - 0.5 %    ABS. NEUTROPHILS 5.6 1.8 - 8.0 K/UL    ABS. LYMPHOCYTES 1.8 0.9 - 3.6 K/UL    ABS. MONOCYTES 1.1 0.05 - 1.2 K/UL    ABS. EOSINOPHILS 0.1 0.0 - 0.4 K/UL    ABS. BASOPHILS 0.1 0.0 - 0.1 K/UL    ABS. IMM. GRANS. 0.2 (H) 0.00 - 0.04 K/UL    DF AUTOMATED     IONIZED CALCIUM    Collection Time: 11/14/22  1:48 PM   Result Value Ref Range    Calcium, ionized 1.09 (L) 1.12 - 1.32 mmol/L         Radiologic Studies -   XR CHEST PORT   Final Result   No acute findings or interval change. Medical Decision Making and ED Course   - I am the first and primary provider for this patient AND AM THE PRIMARY PROVIDER OF RECORD. - I reviewed the vital signs, available nursing notes, past medical history, past surgical history, family history and social history. - Initial assessment performed. The patients presenting problems have been discussed, and the staff are in agreement with the care plan formulated and outlined with them. I have encouraged them to ask questions as they arise throughout their visit. Vital Signs-Reviewed the patient's vital signs. Patient Vitals for the past 12 hrs:   Temp Pulse Resp BP SpO2   11/14/22 1506 -- (!) 47 17 (!) 86/37 97 %   11/14/22 1451 -- (!) 36 14 (!) 87/31 100 %   11/14/22 1436 -- (!) 43 16 90/64 --   11/14/22 1321 98.2 °F (36.8 °C) (!) 53 18 (!) 58/39 92 %       EKG interpretation: No P waves seen. No QRS widening. No obvious A. fib; however, with evaluation of calipers there is some shortening of the RR intervals but not in an irregular manner. No evidence of digoxin toxicity. Bradycardia.     Records Reviewed: Nursing Notes, Old Medical Records, Previous electrocardiograms, Previous Radiology Studies, and Previous Laboratory Studies        Provider Notes (Medical Decision Making):     MDM  Number of Diagnoses or Management Options  ESRD (end stage renal disease) on dialysis ([facility] [address]  Presence of Watchman left atrial appendage closure device  Symptomatic bradycardia  Diagnosis management comments: Patient with complex history. Patient has had chronic history of bradycardia since about August as well as hypotension for which she is on midodrine for. She is also on digoxin and a beta-blocker. Patient has chronic issues for which she has been seen at the ED for multiple times without resolution of symptoms. Patient also notes that she has gone to her PCP and other physicians for her nausea, vomiting, and diarrhea without relief. And prior ED visit she has commented on similar symptoms as to today which is chest pain with radiation to the back, dizziness, and weakness. Less likely ACS as her troponin is actually improved from October 2, 2015. Patient also without a white count, neutrophil predominance, nor fever. Given her urinary history of hydronephrosis with recent stent placement, initial thought was patient could be septic; however, the aforementioned symptoms make this less likely. She most certainly can still have an infection but it may be early in clinical course. Patient could potentially have a beta-blocker toxicity/overdose; however, patient reports she is taking all of her medications as indicated. EKG without evidence of digoxin toxicity and her level is within normal limits at 1.6. TSH is within normal limits as well. Patient had a cardiac cath May 2022 with the following findings:  No obstructive coronary artery disease. Mildly elevated filling pressures, mild pulmonary hypertension and normal cardiac output. Mildly elevated LVEDP. Gave one 250 cc of normal saline as patient is a dialysis patient. Upon reevaluation no significant increase in her blood pressure. Her maps are still in the mid 40s to low 50s. She does not appear fluid overloaded nor does her lungs have crackles.     Page cardiology twice and awaiting callback. Will discuss patient case with them. Patient may be admitted considering her lactic acid is 3.07 which can signify symptomatic bradycardia with impact of end-organ perfusion. Given the chronicity of her symptoms, further evaluation of medications and other organic process provoking her symptoms may be required. Stable turnover to Dr. Bao More. ED Course:       ED Course as of 11/14/22 1530   Mon Nov 14, 2022   1431 POC lactic acid 3.07. [AN]   1435 Cardiology paged [AN]   1440 Blood pressure 90/64. Heart rate 46. [AN]   1500 Stable turnover to Dr. Bao More. [AN]      ED Course User Index  [AN] Cain Mcmanus MD     ------------------------------------------------------------------------------------------------------------          Disposition         Pending      Diagnosis     Clinical Impression:   1. Symptomatic bradycardia    2. Presence of Watchman left atrial appendage closure device    3. ESRD (end stage renal disease) on dialysis [facility])          Malena Webb MD    Please note that this dictation was completed with [employer], the computer voice recognition software. Quite often unanticipated grammatical, syntax, homophones, and other interpretive errors are inadvertently transcribed by the computer software. Please disregard these errors. Please excuse any errors that have escaped final proofreading. Thank you.

## 2022-11-14 NOTE — ED TRIAGE NOTES
Patient arrived via wheelchair complains of generalized pain, SOB, weakness. States was at dialysis today and during dialysis, dialysis catheter came out and leaked \"blood everywhere\" did not finish. Patient states has been feeling bad for a while.  Noted abnormal vitals, sent back to ED room at this time

## 2022-11-15 NOTE — CONSULTS
Cardiology Initial Patient Referral Note    Cardiology referral request from Dr. Bao More for evaluation and management/treatment of bradycardia     Date of  Admission: 11/14/2022  1:34 PM   Primary Care Physician:  Romina Rosado MD    Attending Cardiologist: Dr. Atul Medina:     -A/c hypotension, on Midodrine as outpatient. Multifactorial in setting of below. -Bradycardia, on 12.5 mg lopressor BID and digoxin EOD. HR in the low 40s on prior ECGs and hospitalizations over the last year. Suspect underlying SSS. -Chronic N/V/D, possible gastroparesis. -Chronic dizziness, at baseline.   -Chest pain, MI r/o with serial negative cardiac biomarkers. S/p C 05/2022 without evidence of obstructive CAD. Moderate disease noted in distal RCA. -Paroxysmal Afib. s/p Watchman GOLD device 6/27/22  -Hydronephrosis with right ureteral stent, s/p exchange 11/8/22. -HLD, not on statin d/t intolerance. -DM with neuropathy. -ESRD on HD, M/W/F  -Anemia of chronic disease.   -Chronic hypotension, on Midodrine. -H/o hematuria. -Hypothyroidism on synthroid  -Poor functional status         Primary cardiologist is Dr. Sabino Ward.     Plan:     Rates currently in the 62s. No significant pauses or high grade AV blocks noted on tele. Will wean dopamine gtt as tolerated, continue to monitor HR response with continued lopressor and digoxin washout. No plans to pursue temporary or permanent pacemaker at this time. Repeat ECG. Not on [facility] s/p GOLD device placement. Continue ASA and plavix. Patient currently deciding on goals of care. She would like to have further discussions were her son prior to making final decisions. Volume mgmt per nephrology. Continue supportive care for now.     ----------------------------------  This is a 29-year-old female who has had progressive decline in functional status over the past couple years. She has relative bradycardia with hypotension on pressors. She is on dialysis.   She has had issues with progressive central weakness and dysphagia. Family is at bedside and they are discussing overall goals of care. It may be reasonable to consider a pacemaker to help with her heart rate but I do not suspect this will change her ability to wean off pressors or change her long-term prognosis. I discussed with the ICU team.  Continue to hold Lopressor and digoxin for now. Supportive measures with ongoing discussions about goals of care. I saw, examined, and evaluated this patient and performed the substantive portion of the encounter for > 50% of the time including extensive history, physical exam, and medical decision making as discussed with patient and next-of-kin as needed. I personally reviewed the patient's labs, tests, vitals, orders, medications, updated history, and other providers assessments as well. I personally agree with the findings as stated and the plan as documented. Sarah Burdick MD       History of Present Illness: This is a 76 y.o. female admitted for Hypotension [I95.9]  Bradycardia [R00.1]. Patient complains of: CP, SOB   Tara Stern is a 76 y.o. female, pmhx as stated above, who we are seeing for bradycardia. Patient reports having CP during HD yesterday. She states near the end of HD, her arm was bleeding from her line and her chest started to hurt from seeing the blood. Pain self resolved after arriving to the ER. In the ER it was noted her SBP was in the 50s, HR in the 50s. She is chronically hypotensive requiring Midodrine. She was given a 250 cc bolus of fluid with slight improvement of her pressures. She was then started on her outpatient midodrine and pressor support. She takes digoxin and lopressor for her AF rate control as an outpatient. She states she is taking them as directed. She reports since starting dialysis, her QOL has been declining She is getting progressively more weak with dialysis.  Denies SOB, worsening dizziness, near syncope or syncope, diaphoresis, palpitations. Cardiac risk factors: dyslipidemia, diabetes mellitus, obesity, sedentary life style, post-menopausal  Review of Symptoms:  Except as stated above include:  Constitutional:  negative  Respiratory:  negative  Cardiovascular:  negative  Gastrointestinal: negative  Genitourinary:  negative  Musculoskeletal:  Negative  Neurological:  Negative  Dermatological:  Negative  Endocrinological: Negative  Psychological:  Negative    A comprehensive review of systems was negative except for that written in the HPI. Past Medical History:     Past Medical History:   Diagnosis Date    Anemia in end-stage renal disease ([facility] [facility]     Anticoagulated by anticoagulation treatment     On Apixaban    Chronic hypotension     On Midodrine    Chronic kidney disease     ESRD on HD MWF    Chronic pain     Closed fracture of left inferior pubic ramus, with routine healing, subsequent encounter 11/12/2021    Closed fracture of superior pubic ramus, left, with routine healing, subsequent encounter 11/12/2021    Closed nondisplaced fracture of anterior wall of left acetabulum with routine healing 11/12/2021    Depression     End-stage renal disease on hemodialysis ([facility]     HD at [facility] on MWF.  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    Urine culture (collected 10/8/2021, resulted 10/14/2021) yielded growth of >100,000 colonies/ml of Enterococcus faecalis RESISTANT to Ciprofloxacin, Levofloxacin, Tetracycline and Vancomycin    History of kidney stones     History of recurrent urinary tract infection     History of sepsis 06/18/2021    History of septic shock 10/08/2021    History of urethral stricture     History of urinary tract infection 10/08/2021    Urine culture (collected 10/8/2021, resulted 10/14/2021) yielded growth of >100,000 colonies/ml of Enterococcus faecalis RESISTANT to Ciprofloxacin, Levofloxacin, Tetracycline and Vancomycin    Hyperlipidemia     Hyperphosphatemia 11/14/2021    Hypothyroidism     Lung mass     Mononeuropathy     Involving ring finger of left hand    Need for prophylactic isolation 10/08/2021    Urine culture (collected 10/8/2021, resulted 10/14/2021) yielded growth of >100,000 colonies/ml of Enterococcus faecalis RESISTANT to Ciprofloxacin, Levofloxacin, Tetracycline and Vancomycin    Osteoporosis     Paroxysmal atrial fibrillation (HCC)     Secondary hyperparathyroidism of renal origin ([facility]     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 ([facility]     HbA1c (10/8/2021) = 4.6    Uric acid nephrolithiasis     Urinary incontinence          Social History:     Social History     Socioeconomic History    Marital status: SINGLE   Tobacco Use    Smoking status: Never    Smokeless tobacco: Never   Vaping Use    Vaping Use: Never used   Substance and Sexual Activity    Alcohol use: Never    Drug use: Never        Family History:     Family History   Problem Relation Age of Onset    Heart Surgery Sister         Medications:      Allergies   Allergen Reactions    Albumin, Human 25 % Itching     Headache - severe migraine like, itchy eyes, runny nose    Ciprofloxacin Hives    Cyclopentolate Unknown (comments)    Iron Sucrose Diarrhea    Statins-Hmg-Coa Reductase Inhibitors Other (comments)     Body ache        Current Facility-Administered Medications   Medication Dose Route Frequency    DOPamine (INTROPIN) 800 mg in dextrose 5% 500 mL infusion  5-20 mcg/kg/min IntraVENous TITRATE    magnesium sulfate 2 g/50 ml IVPB (premix or compounded)  2 g IntraVENous ONCE    midodrine (PROAMATINE) tablet 10 mg  10 mg Oral TID WITH MEALS    Vancomycin: Pharmacy to dose   Other Rx Dosing/Monitoring    cefepime (MAXIPIME) 1 g in 0.9% sodium chloride (MBP/ADV) 50 mL MBP  1 g IntraVENous Q24H    NOREPINephrine (LEVOPHED) 8 mg in 5% dextrose 250mL (32 mcg/mL) infusion  0.5-50 mcg/min IntraVENous TITRATE    sodium chloride (NS) flush 5-40 mL  5-40 mL IntraVENous Q8H    sodium chloride (NS) flush 5-40 mL  5-40 mL IntraVENous PRN    acetaminophen (TYLENOL) tablet 650 mg  650 mg Oral Q6H PRN    Or    acetaminophen (TYLENOL) suppository 650 mg  650 mg Rectal Q6H PRN    polyethylene glycol (MIRALAX) packet 17 g  17 g Oral DAILY PRN    ondansetron (ZOFRAN ODT) tablet 4 mg  4 mg Oral Q8H PRN    Or    ondansetron (ZOFRAN) injection 4 mg  4 mg IntraVENous Q6H PRN    heparin (porcine) injection 5,000 Units  5,000 Units SubCUTAneous Q8H    morphine IR (MS IR) tablet 7.5 mg  7.5 mg Oral Q6H PRN    DULoxetine (CYMBALTA) capsule 20 mg  20 mg Oral DAILY    famotidine (PF) (PEPCID) 20 mg in 0.9% sodium chloride 10 mL injection  20 mg IntraVENous Q24H    thiamine (B-1) 200 mg in 0.9% sodium chloride 50 mL IVPB  200 mg IntraVENous DAILY    folic acid (FOLVITE) tablet 1 mg  1 mg Oral DAILY         Physical Exam:   Visit Vitals  BP (!) 81/69 (BP 1 Location: Right lower arm, BP Patient Position: At rest)   Pulse 63   Temp 98.3 °F (36.8 °C)   Resp 11   Ht 5' 2\" (1.575 m)   Wt 187 lb 6.3 oz (85 kg)   SpO2 100%   BMI 34.27 kg/m²       TELE: Bradycardia     BP Readings from Last 3 Encounters:   11/15/22 (!) 81/69   11/08/22 (!) 95/23   10/21/22 (!) 98/54     Pulse Readings from Last 3 Encounters:   11/15/22 63   11/08/22 74   10/21/22 (!) 56     Wt Readings from Last 3 Encounters:   11/14/22 187 lb 6.3 oz (85 kg)   11/08/22 202 lb (91.6 kg)   11/01/22 202 lb (91.6 kg)       General:  alert, cooperative, no distress, appears stated age  Neck:  no JVD  Lungs:  clear to auscultation bilaterally  Heart:  bradycardic rate, reg rhythm   Abdomen:  abdomen is soft without significant tenderness, masses, organomegaly or guarding  Extremities:  extremities normal, atraumatic, no cyanosis or edema  Skin: Warm and dry.  no hyperpigmentation, vitiligo, or suspicious lesions  Neuro: alert, oriented x3, affect appropriate, no focal neurological deficits, moves all extremities well  Psych: non focal     Data Review:     Recent Labs     11/15/22  0400 11/14/22  1330   WBC 16.4* 8.9   HGB 9.5* 10.4*   HCT 29.3* 32.3*    282     Recent Labs     11/15/22  0400 11/14/22 2026 11/14/22  1330    137 139   K 3.6 5.2 3.8    102 101   CO2 26 27 28   * 109* 147*   BUN 20* 17 15   CREA 6.22* 5.79* 5.42*   CA 8.4* 8.5 9.1   MG 1.8  --  1.8   PHOS 4.0  --   --    ALB  --   --  3.3*   ALT  --   --  15   INR  --   --  1.0       Results for orders placed or performed during the hospital encounter of 11/14/22   EKG, 12 LEAD, INITIAL   Result Value Ref Range    Ventricular Rate 50 BPM    Atrial Rate 64 BPM    QRS Duration 94 ms    Q-T Interval 418 ms    QTC Calculation (Bezet) 381 ms    Calculated R Axis -27 degrees    Calculated T Axis -135 degrees    Diagnosis       Probable Junctional rhythm  ST & T wave abnormality, consider inferolateral ischemia  Abnormal ECG  When compared with ECG of 08-NOV-2022 13:37,  Junctional rhythm has replaced Atrial fibrillation  Minimal criteria for Anterior infarct are no longer present  Inverted T waves have replaced nonspecific T wave abnormality in Lateral   leads  QT has shortened  Confirmed by Ion Burden MD, --- ([id number]) on 11/14/2022 4:50:01 PM         All Cardiac Markers in the last 24 hours:  No results found for: CPK, CK, CKMMB, CKMB, RCK3, CKMBT, CKNDX, CKND1, PATTI, TROPT, TROIQ, GRAHAM, TROPT, TNIPOC, BNP, BNPP    Last Lipid:  No results found for: CHOL, CHOLX, CHLST, CHOLV, HDL, HDLP, LDL, LDLC, DLDLP, TGLX, TRIGL, TRIGP, CHHD, CHHDX    Cardiographics:     EKG Results       Procedure 720 Value Units Date/Time    EKG, 12 LEAD, REPEAT [[id number]] Collected: 11/14/22 1924    Order Status: Completed Updated: 11/14/22 1926     Ventricular Rate 56 BPM      QRS Duration 86 ms      Q-T Interval 410 ms      QTC Calculation (Bezet) 395 ms      Calculated R Axis -27 degrees      Calculated T Axis -175 degrees      Diagnosis --     Junctional rhythm with premature supraventricular complexes in a pattern of   bigeminy  Low voltage QRS  Nonspecific ST and T wave abnormality  Abnormal ECG  When compared with ECG of 14-NOV-2022 13:40,  premature supraventricular complexes are now present      EKG, 12 LEAD, INITIAL [[id number]] Collected: 11/14/22 1340    Order Status: Completed Updated: 11/14/22 1650     Ventricular Rate 50 BPM      Atrial Rate 64 BPM      QRS Duration 94 ms      Q-T Interval 418 ms      QTC Calculation (Bezet) 381 ms      Calculated R Axis -27 degrees      Calculated T Axis -135 degrees      Diagnosis --     Probable Junctional rhythm  ST & T wave abnormality, consider inferolateral ischemia  Abnormal ECG  When compared with ECG of 08-NOV-2022 13:37,  Junctional rhythm has replaced Atrial fibrillation  Minimal criteria for Anterior infarct are no longer present  Inverted T waves have replaced nonspecific T wave abnormality in Lateral   leads  QT has shortened  Confirmed by Chester Bernardo MD, --- ([id number]) on 11/14/2022 4:50:01 PM            08/15/22    ECHO CATINA W OR WO CONTRAST 08/15/2022 8/15/2022    Interpretation Summary    Left Ventricle: Normal left ventricular systolic function with a visually estimated EF of 55 - 60%. Left Atrium: No left atrial appendage thrombus noted. Watchman well-seated with no residual jet around the device. Signed by: Flor Porter MD on 8/15/2022 12:22 PM      11/11/20    NUCLEAR CARDIAC STRESS TEST 11/11/2020 11/11/2020    Interpretation Summary  · Baseline ECG: Normal sinus rhythm. · Negative stress test.  · Gated SPECT: Left ventricular function post-stress was normal. Calculated ejection fraction is 67%. There is no evidence of transient ischemic dilation (TID). The TID ratio is 0.91.  · Myocardial perfusion imaging supports a low risk stress test.  · Left ventricular perfusion is normal.    Signed by: Jesse Coburn MD on 11/11/2020 12:47 PM    08/18/22    INVASIVE VASCULAR PROCEDURE 08/18/2022 8/18/2022    Narrative  See op note    Signed by: Rome Welsh MD on 8/18/2022  9:53 AM      XR Results (most recent):  Results from [place] encounter on 11/14/22    XR CHEST PORT    Narrative  EXAM: XR CHEST PORT    INDICATION: chest pain    COMPARISON: 10/21/2022. FINDINGS: A single view of the chest demonstrates chronic linear scarring  without change. No new lung findings. Stable elevated right hemidiaphragm. .  Stable cardiac silhouette. . No acute bone findings. .    Impression  No acute findings or interval change.         Signed By: Nury Monroy PA-C     November 15, 2022

## 2022-11-15 NOTE — PROGRESS NOTES
Pharmacy Note     Cefepime 2gm q8h ordered for treatment of sepsis. Per [address] [place] [address], order will be changed to 2 gm IV x 1 dose followed by 1 gram extended infusion q24 hours. Estimated Creatinine Clearance: Estimated Creatinine Clearance: 8.5 mL/min (A) (based on SCr of 5.42 mg/dL (H)). Dialysis Status, JADON, CKD: yes    BMI:  Body mass index is 34.27 kg/m². Rationale for Adjustment:  [facility] B-Lactam extended infusion policy    Pharmacy will continue to monitor and adjust dose as necessary. Please call with any questions.     Thank you,  Reese Mcintyre, RAQUELD

## 2022-11-15 NOTE — PROCEDURES
Corwin Plata  1945  [id number]  11/14/2022    Indication: Need for vasopressors    Risks, benefits, alternatives explained and consent obtained, witnessed by RN and placed into the chart. Patient positioned in Trendelenburg. A time out was completed. Central line Bundle:   Full sterile barrier precautions used. 7-Step Sterility Protocol followed. (cap, mask sterile gown, sterile gloves, large sterile sheet, hand hygiene, 2% chlorhexidine for cutaneous antisepsis)  5 mL 1% Lidocaine placed at insertion site. Using ultrasound guidance,   Right femoral cannulated x 1 attempt(s) and utilizing the Seldinger technique, a 7fr, 16cm, triple lumen CVL was placed with no difficulty. Good, dark red,  non pulsatile blood return noted. Catheter secured & Biopatch applied. Sterile Tegaderm placed. Patient tolerated the procedure well.       Bailey Schmidt MD  9:42 PM

## 2022-11-15 NOTE — PROGRESS NOTES
[facility]. Pulmonary, Critical Care, and Sleep Medicine    Name: Vinay Franco MRN: [id number]   : 1945 Hospital: [facility]   Date: 11/15/2022  Admission Date: 2022     Chart and notes reviewed. Data reviewed. I have evaluated all findings. [x]I have reviewed the flowsheet and previous days notes. []The patient is unable to give any meaningful history or review of systems because the patient is:  []Intubated []Sedated   []Unresponsive      [x]The patient is critically ill on      []Mechanical ventilation [x]Pressors   []BiPAP []         Interval HPI:\"Patient is a 76 y.o. female w/ PMH of chronic hypotension, right hydonephrosis with stent, depression, afibb, DM2 with neuropathy, ESRD on HD presenting to [facility] with c/o pain, weakness, and SOB on . Patient is visibly anxious and uncomfortable. History obtained from patient, chart, PFM, and son. Patient reports 3 days of diarrhea PTA. Son reports poor intake for mos with associated n/v. Son reports worsening decline since starting digoxin. PFM states her HR normally is a lot higher and BP in [id number]'Y-988C systolic in the office. Patient is currently complaining of leg cramps/ pain and restlessness. Patient is complaining of weakness and inability to stand to complete ADLs while at home. Also complaining of presyncope/lightheaded for a few weeks. She is no longer c/o CP or SOB but had this off and on while in the ED per ED notes. Patient states she is overall very uncomfortable. She expressed she is very frustrated with having to return to the hospital many times and has been offered hospice in the past. She states she is not ready for this because she is [place] coward\" but she also is exhausted from all the treatment and discomfort.  \"    Subjective 11/15/22  Hospital Day: 2  N/V overnight  HR dropped into 30's with worsening hypotension- dopamine added  Hallucinations overnight - did state she was having these at home as well  Restlessness an anxiety improved overnight, was given ativan and morphine while in ED                 ROS:Review of systems not obtained due to patient factors. Events and notes from last 24 hours reviewed. Patient Active Problem List   Diagnosis Code    History of acute pyelonephritis Z87.448    History of infection with vancomycin resistant Enterococcus (VRE) Z86.19    Anemia in end-stage renal disease (HCC) N18.6, D63.1    Chronic hypotension I95.89    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 ([facility]) E11.22, N18.6    Secondary hyperparathyroidism of renal origin ([facility] [address] N25.81    Gastroesophageal reflux disease K21.9    History of recurrent urinary tract infection Z87.440    History of sepsis Z86.19    End-stage renal disease on hemodialysis ([facility]) N18.6, Z99.2    History of hydronephrosis Z87.448    History of septic shock Z86.19    Anticoagulated by anticoagulation treatment Z79.01    Paroxysmal atrial fibrillation ([facility]) I48.0    Closed fracture of left inferior pubic ramus, with routine healing, subsequent encounter S32.592D    Closed nondisplaced fracture of anterior wall of left acetabulum with routine healing S32.415D    Closed fracture of superior pubic ramus, left, with routine healing, subsequent encounter S32.512D    Multiple closed pelvic fractures without disruption of pelvic ring ([facility] [address] S32.82XA    Depression F32. A    History of urinary tract infection Z87.440    Need for prophylactic isolation Z41.8    Hyperlipidemia E78.5    Hypothyroidism E03.9    History of kidney stones Z87.442    Chronic pain G89.29    Lung mass R91.8    History of urethral stricture Z87.448    Uric acid nephrolithiasis N20.0    Urinary incontinence R32    Glaucoma H40.9    Hyperphosphatemia E83.39    Mononeuropathy G58.9    Hyperkalemia E87.5    Gross hematuria R31.0    Impaired mobility and ADLs Z74.09, Z78.9    Stricture of female urethra N35.92    ESRD on dialysis ([facility]) N18.6, Z99.2    SOB (shortness of breath) on exertion R06.02    A-fib (HCC) I48.91    Presence of Watchman left atrial appendage closure device Z95.818    Ureteral stricture N13.5    Bradycardia R00.1    Hypotension I95.9       Vital Signs:  Visit Vitals  BP (!) 89/56 (BP 1 Location: Right lower arm, BP Patient Position: At rest)   Pulse 65   Temp 98.3 °F (36.8 °C)   Resp 17   Ht 5' 2\" (1.575 m)   Wt 85 kg (187 lb 6.3 oz)   SpO2 98%   BMI 34.27 kg/m²       O2 Device: None (Room air)   O2 Flow Rate (L/min): 2 l/min   Temp (24hrs), Av.3 °F (36.8 °C), Min:98.2 °F (36.8 °C), Max:98.3 °F (36.8 °C)       Intake/Output:   Last shift:      No intake/output data recorded. Last 3 shifts: No intake/output data recorded.   No intake or output data in the 24 hours ending 11/15/22 1511       Current Facility-Administered Medications   Medication Dose Route Frequency    DOPamine (INTROPIN) 800 mg in dextrose 5% 500 mL infusion  5-20 mcg/kg/min IntraVENous TITRATE    magnesium sulfate 2 g/50 ml IVPB (premix or compounded)  2 g IntraVENous ONCE    midodrine (PROAMATINE) tablet 10 mg  10 mg Oral TID WITH MEALS    Vancomycin: Pharmacy to dose   Other Rx Dosing/Monitoring    cefepime (MAXIPIME) 1 g in 0.9% sodium chloride (MBP/ADV) 50 mL MBP  1 g IntraVENous Q24H    NOREPINephrine (LEVOPHED) 8 mg in 5% dextrose 250mL (32 mcg/mL) infusion  0.5-50 mcg/min IntraVENous TITRATE    sodium chloride (NS) flush 5-40 mL  5-40 mL IntraVENous Q8H    heparin (porcine) injection 5,000 Units  5,000 Units SubCUTAneous Q8H    DULoxetine (CYMBALTA) capsule 20 mg  20 mg Oral DAILY    famotidine (PF) (PEPCID) 20 mg in 0.9% sodium chloride 10 mL injection  20 mg IntraVENous Q24H    thiamine (B-1) 200 mg in 0.9% sodium chloride 50 mL IVPB  200 mg IntraVENous DAILY    folic acid (FOLVITE) tablet 1 mg  1 mg Oral DAILY         Telemetry: [x]gavino []A-flutter []Paced    []A-fib []Multiple PVCs                  Physical Exam:      General: awake, alert, NAD   HEENT:  Anicteric sclerae; pink palpebral conjunctivae; mucosa moist  Resp:  Symmetrical chest expansion, no accessory muscle use; good airway entry; no rales/ wheezing/ rhonchi noted  CV:  S1, S2 present; regular rate and rhythm  GI:  Abdomen soft, non-tender; (+) active bowel sounds  Extremities:  diminished on all extremities; no edema/ cyanosis/ clubbing noted, left upper arm fistula   Skin:  Warm; no rashes/ lesions noted, normal turgor/cap refill   Neurologic:  Non-focal  Devices:  femoral CVL       DATA:  MAR reviewed and pertinent medications noted or modified as needed    Labs:  Recent Labs     11/15/22  0400 11/14/22  1330   WBC 16.4* 8.9   HGB 9.5* 10.4*   HCT 29.3* 32.3*    282     Recent Labs     11/15/22  0400 11/14/22  2026 11/14/22  1330    137 139   K 3.6 5.2 3.8    102 101   CO2 26 27 28   * 109* 147*   BUN 20* 17 15   CREA 6.22* 5.79* 5.42*   CA 8.4* 8.5 9.1   MG 1.8  --  1.8   PHOS 4.0  --   --    ALB  --   --  3.3*   ALT  --   --  15   INR  --   --  1.0     No results for input(s): PH, PCO2, PO2, HCO3, FIO2 in the last 72 hours. No results for input(s): FIO2I, IFO2, HCO3I, IHCO3, HCOPOC, PCO2I, PCOPOC, IPHI, PHI, PHPOC, PO2I, PO2POC in the last 72 hours. No lab exists for component: IPOC2    Imaging:  [x]   I have personally reviewed the patients radiographs and reports  XR Results (most recent):  XR Results (most recent):  Results from Hospital Encounter encounter on 11/14/22    XR CHEST PORT    Narrative  EXAM: XR CHEST PORT    INDICATION: chest pain    COMPARISON: 10/21/2022. FINDINGS: A single view of the chest demonstrates chronic linear scarring  without change. No new lung findings. Stable elevated right hemidiaphragm. .  Stable cardiac silhouette. . No acute bone findings. .    Impression  No acute findings or interval change. CT Results (most recent):  Results from Hospital Encounter encounter on 11/14/22    CTA CHEST ABD PELV W WO CONT    Narrative  CT angiogram aorta.  CT chest, abdomen and pelvis without and with IV contrast.    HISTORY: Chest pain and back pain with hypotension. All CT scans at this facility are performed using dose optimization technique as  appropriate to a performed exam, to include automated exposure control,  adjustment of the mA and/or kV according to patient size (including appropriate  matching for site specific examination) or use of iterative reconstruction  technique. Dialysis patient, to get dialysis the next morning. Axial CT images obtained. Sagittal and coronal MIP images of the aorta were  generated. Dedicated 3-D images of the aorta and its branches also generated on  a dedicated workstation. Sagittal and coronal images of abdomen and pelvis also  generated. CT angiogram aorta: Noncontrast study shows no intramural hematoma. Contrast  study shows normal caliber throughout. No aortic dissection. Moderate  atherosclerotic calcification present. No focal aneurysm. Some degree of  stenosis due to plaques in the celiac and SMA origins. PATRICIO is occluded, also  obscured by motion artifacts. Stenotic renal arteries. Iliac arteries are within  normal caliber. Minimal ectasia of the bifurcation. CT CHEST: No pulmonary infiltrate or consolidation. No pleural effusion or  pneumothorax. No mediastinal or hilar mass. No cardiomegaly or pericardial  effusion. Corticated calcification noted. No central pulmonary embolism. CT ABDOMEN/PELVIS: Liver and spleen unremarkable. Cholecystectomy. Small hiatal  hernia. Gastric surgery. Pancreas grossly unremarkable. No adrenal mass. Atrophic kidneys with cystic lesions. Double J ureteral stent on the right. No  surrounding inflammation. Small bowel and colon not distended. No extraluminal inflammation. Suspect  scattered colonic diverticulosis. No ascites or free air. Mild subcutaneous edema throughout. Small amount of excreted contrast material  in the decompressed bladder. Uterus unremarkable.  No pelvic mass. Degenerative disease in the thoracolumbar spine. Old left inferior pubic ramal  fracture. Impression  1. No aortic aneurysm or dissection. Moderately advanced atherosclerotic  disease. 2. No acute abnormalities in the chest, abdomen or pelvis. 08/15/22    ECHO CATINA W OR WO CONTRAST 08/15/2022 8/15/2022    Interpretation Summary    Left Ventricle: Normal left ventricular systolic function with a visually estimated EF of 55 - 60%. Left Atrium: No left atrial appendage thrombus noted. Watchman well-seated with no residual jet around the device. Signed by: Carolina Pete MD on 8/15/2022 12:22 PM       IMPRESSION:   Acute on chronic hypotension (OP midodrine) requiring levophed, ? SIRs/Sepsis with recent instrumentation of stent vs hypovolemia due to poor intake vs symptomatic bradycardia  Symptomatic bradycardia- since starting of digoxin in the last month  SIRs v Sepsis- lactic acidosis + hypotension. Now with leukocytosis.  Likely these are results of the persistent bradycardia with baseline hypotension, procal is low, however patient is ESRD and had recent instrumentation, need to cover for infection until cultures are returned   Hallucinations- ?due to morphine   N/V/D x mos with poor PO intake, suspect gastroparesis   Hx a fib s/p watchman procedure   ESRD on HD  Hx hydronephrosis with chronic right ureteral stent, recent exchanged 11/8  DM2 with diabetic neuropathy, suspect DAN  Debility with functional weakness, needs assistance with ADLs  HX anxiety/depression      Patient Active Problem List   Diagnosis Code    History of acute pyelonephritis Z87.448    History of infection with vancomycin resistant Enterococcus (VRE) Z86.19    Anemia in end-stage renal disease (HCC) N18.6, D63.1    Chronic hypotension I95.89    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) E11.22, N18.6    Secondary hyperparathyroidism of renal origin ([facility] [address] N25.81    Gastroesophageal reflux disease K21.9    History of recurrent urinary tract infection Z87.440    History of sepsis Z86.19    End-stage renal disease on hemodialysis ([facility]) N18.6, Z99.2    History of hydronephrosis Z87.448    History of septic shock Z86.19    Anticoagulated by anticoagulation treatment Z79.01    Paroxysmal atrial fibrillation ([facility]) I48.0    Closed fracture of left inferior pubic ramus, with routine healing, subsequent encounter S32.592D    Closed nondisplaced fracture of anterior wall of left acetabulum with routine healing S32.415D    Closed fracture of superior pubic ramus, left, with routine healing, subsequent encounter S32.512D    Multiple closed pelvic fractures without disruption of pelvic ring ([address] S32.82XA    Depression F32. A    History of urinary tract infection Z87.440    Need for prophylactic isolation Z41.8    Hyperlipidemia E78.5    Hypothyroidism E03.9    History of kidney stones Z87.442    Chronic pain G89.29    Lung mass R91.8    History of urethral stricture Z87.448    Uric acid nephrolithiasis N20.0    Urinary incontinence R32    Glaucoma H40.9    Hyperphosphatemia E83.39    Mononeuropathy G58.9    Hyperkalemia E87.5    Gross hematuria R31.0    Impaired mobility and ADLs Z74.09, Z78.9    Stricture of female urethra N35.92    ESRD on dialysis ([facility]) N18.6, Z99.2    SOB (shortness of breath) on exertion R06.02    A-fib ([facility]) I48.91    Presence of Watchman left atrial appendage closure device Z95.818    Ureteral stricture N13.5    Bradycardia R00.1    Hypotension I95.9        RECOMMENDATIONS:   Neuro: pain medication restarted, cymbalta restarted, Ativan x 1 dose while in ED   Pulm: Supplemental O2 via NC, titrate flow for goal SPO2> 90% Bronchodilators, steroids, pulmonary hygiene care, Aspiration precautions, Keep HOB >30 degrees  CVS :Actively titrate vasopressors aim systolic >38 mmHg, hold rate controlling medications, BP/symptoms improving, cards to see tomorrow   GI: SUP,Zofran PRN for N/V, Diet- ADULT DIET Regular, consider GI consult for persistent n/v   Renal:  Trend Renal indices, nephro following   Hem/Onc: Monitor for s/o active bleeding. I/D:Sepsis bundle per hospital protocol, Blood and Urine cultures drawn and will be followed. Lactic acid normalized. Antibiotics:cefepime, vanco Trend WBCs and temperature curve. Procal was low but wait or cx's to return before d/c abx. Resp viral panel (-)  Endocrine: Q6 glucoses, SSI. TSH normal   Metabolic:  Daily BMP, mag, phos. Trend lytes, replace as needed. Musc/Skin: no acute issues  DNR/DNI, signed POST w limited interventions   Discussed in interdisciplinary rounds     Best practice :    Glycemic control  IHI ICU bundles:   Central Line Bundle Followed     Stress ulcer prophylaxis. Pepcid  DVT prophylaxis. SQH  Need for Lines, chua assessed. Palliative care evaluation. Restraints need. 15 minutes were spent with the patient at the bedside. This care involved high complexity decision making to assess, manipulate, and support vital system functions, to treat this degreee vital organ system failure and to prevent further life threatening deterioration of the patients condition  The services I provided to this patient were to treat and/or prevent clinically significant deterioration that could result in the failure of one or more body systems and/or organ systems due to respiratory distress, hypoxia, cardiac dysrhythmia.        Madhav Raines PA-C   11/15/22  Pulmonary, Critical Care Medicine  [facility]

## 2022-11-15 NOTE — PROGRESS NOTES
[facility] Pharmacokinetic Monitoring Service - Vancomycin     Carlos Arguello is a 76 y.o. female starting on vancomycin therapy for Sepsis of Unknown Etiology. Pharmacy consulted by Shyla Michelle MD for monitoring and adjustment. Target Concentration: Dosing based on anticipated concentration <15 mg/L due to renal impairment/insufficiency    Additional Antimicrobials: Cefepime    Pertinent Laboratory Values:   Temp: 98.2 °F (36.8 °C)  Weight: 85 kg (187 lb 6.3 oz)  Recent Labs     11/14/22  1330   CREA 5.42*   BUN 15   WBC 8.9     Estimated Creatinine Clearance: 8.5 mL/min (A) (based on SCr of 5.42 mg/dL (H)).     Plan:  Concentration-guided dosing due to renal impairment/insufficiency  Start vancomycin 2000 mg IV x 1 dose  Will dose according to vancomycin concentration levels at this time due to renal insufficiency  Renal labs as indicated   Vancomycin concentration not ordered   Pharmacy will continue to monitor patient and adjust therapy as indicated    Thank you for the consult,  Carrillo Cornelius  11/14/2022

## 2022-11-15 NOTE — PROGRESS NOTES
Patient received from the ED on norepinephrine infusion, HR 50bpm, BP 76/61 mmHg.  0300: patient's HR remained low at 40s,reviewed by provider and started on dopamine infusion. 0730: Bedside shift change report given to [facility] (oncoming nurse) by Andrew Ivey (offgoing nurse). Report included the following information SBAR.

## 2022-11-15 NOTE — PROGRESS NOTES
Problem: Falls - Risk of  Goal: *Absence of Falls  Description: Document Raghavendra Serrano Fall Risk and appropriate interventions in the flowsheet.   Outcome: Progressing Towards Goal  Note: Fall Risk Interventions:       Mentation Interventions: Adequate sleep, hydration, pain control, Bed/chair exit alarm, Door open when patient unattended, Evaluate medications/consider consulting pharmacy, Toileting rounds    Medication Interventions: Assess postural VS orthostatic hypotension, Bed/chair exit alarm, Evaluate medications/consider consulting pharmacy    Elimination Interventions: Bed/chair exit alarm, Call light in reach, Toileting schedule/hourly rounds    History of Falls Interventions: Bed/chair exit alarm, Consult care management for discharge planning, Door open when patient unattended, Evaluate medications/consider consulting pharmacy         Problem: Patient Education: Go to Patient Education Activity  Goal: Patient/Family Education  Outcome: Progressing Towards Goal     Problem: Pain  Goal: *Control of Pain  Outcome: Progressing Towards Goal     Problem: Patient Education: Go to Patient Education Activity  Goal: Patient/Family Education  Outcome: Progressing Towards Goal     Problem: Hypotension  Goal: *Blood pressure within specified parameters  Outcome: Progressing Towards Goal  Goal: *Fluid volume balance  Outcome: Progressing Towards Goal  Goal: *Labs within defined limits  Outcome: Progressing Towards Goal     Problem: Patient Education: Go to Patient Education Activity  Goal: Patient/Family Education  Outcome: Progressing Towards Goal

## 2022-11-15 NOTE — PROGRESS NOTES
CM emailed [employer] to request a Medicaid application with the [facility] be commenced. Also, the patient's son Lauro Blanchard is the person of contact at [phone] to provide the patient's income information.     ABHISHEK Verdugo, HP

## 2022-11-15 NOTE — PROGRESS NOTES
Reason for Admission:   Hypotension [I95.9]  Bradycardia [R00.1]    PCP: Liliana Palumbo MD               RUR Score:    27%             Resources/supports as identified by patient/family:       Top Challenges facing patient (as identified by patient/family and CM):  CM informed family are transplants from [facility]. and have to outside support network    Finances/Medication cost?  $1,000.00/Month  Transportation   Family  Support system or lack thereof? No bounds in the community  Living arrangements? Lives with son and daughter-in-law  Self-care/ADLs/Cognition? Requires assist       Current Advanced Directive/Advance Care Plan:   yes    Healthcare Decision Maker:   Primary Decision Maker: Ace Narda - [phone]    Secondary Decision Maker: Malathi Daniel - Daughter-in-Law - [phone]    Click here to complete [address] including selection of the Healthcare Decision Maker Relationship (ie \"Primary\")                          Plan for utilizing home health:    no                      Likelihood of readmission:   HIGH    Transition of Care Plan:                    Initial assessment completed with patient and relative(s). Cognitive status of patient: oriented to time, place, person and situation. Face sheet information confirmed:  yes. The patient designates [facility] to participate in her discharge plan and to receive any needed information. This patient lives in a single family home with patient and son and daughter-in-law. Patient is not able to navigate steps as needed. Prior to hospitalization, patient was considered to be independent with ADLs/IADLS : no . If not independent,  patient needs assist with : dressing, bathing, food preparation, cooking, toileting, and grooming    Patient has a current ACP document on file: yes  The son will be available to transport patient home upon discharge. The patient already has [facility] Leriche, and CPAP medical equipment available in the home. Patient is not currently active with home health. . Patient has not stayed in a skilled nursing facility or rehab. Was  stay within last 60 days : no. This patient is on dialysis :yes     Freedom of choice signed: yes, for any accepting Currently, the discharge plan is Home. The patient states that she can obtain her medications from the pharmacy, and take her medications as directed. Patient's current insurance is Payor: [employer]ICARE / Plan: [id number] HMO / Product Type: [employer] Medicare /       Care Management Interventions  PCP Verified by CM: Yes  Mode of Transport at Discharge:  Other (see comment) (Patient Medicare )  Transition of Care Consult (CM Consult): Discharge Planning, [place]: Yes  MyChart Signup: No  Discharge Durable Medical Equipment: No  Physical Therapy Consult: No  Occupational Therapy Consult: No  Speech Therapy Consult: No  Support Systems: Child(isaura)  Discharge Location  Patient Expects to be Discharged to[de-identified] Home with hospice        ABHISHEK Delgadillo, QMHP

## 2022-11-15 NOTE — HOSPICE
Hospice referral received. Chart review in process. Thank you for the referral to [address] [place] [address].  If we can be of further assistance please contact [phone], RN  Clinical Manager  [place] [address] [facility], [facility] [address]  [phone]  Email: [email or web address]

## 2022-11-15 NOTE — DIABETES MGMT
Glycemic Control:  Pt with  history of Diabetes. Pt's blood glucose readings are above  the target range. Recommend corrective lispro insulin. Recent Glucose Results:   Lab Results   Component Value Date/Time     (H) 11/15/2022 04:00 AM     (H) 11/14/2022 08:26 PM     (H) 11/14/2022 01:30 PM    GLUCPOC 172 (H) 11/15/2022 07:54 AM    GLUCPOC 154 (H) 11/15/2022 05:15 AM     Lab Results   Component Value Date/Time    Hemoglobin A1c 4.3 04/12/2022 12:46 PM   Current A1c pending. Continue to monitor for glycemic control.   Estella Tucker MS RD CNSC BC-ADM

## 2022-11-15 NOTE — PROGRESS NOTES
[facility]  Pulmonary, Critical Care, and Sleep Medicine    Name: Jose Enrique Coelho MRN: [id number]   : 1945 Hospital: [address] [place] [address] Dr   Date: 2022        Critical Care History and Physical      IMPRESSION:   Acute on chronic hypotension (OP midodrine) requiring levophed, ? SIRs/Sepsis with recent instrumentation of stent vs hypovolemia due to poor intake vs symptomatic bradycardia  Symptomatic bradycardia- since starting of digoxin in the last month  SIRs v Sepsis- lactic acidosis + hypotension.  Likely these are results of the persistent bradycardia with baseline hypotension, procal is low, however patient is ESRD and had recent instrumentation, need to cover for infection until cultures are returned   N/V/D x mos with poor PO intake, suspect gastroparesis   Hx a fib s/p watchman procedure   ESRD on HD  Hx hydronephrosis with chronic right ureteral stent, recent exchanged /  DM2 with diabetic neuropathy, suspect DAN  Debility with functional weakness, needs assistance with ADLs  HX anxiety/depression      Patient Active Problem List   Diagnosis Code    History of acute pyelonephritis Z87.448    History of infection with vancomycin resistant Enterococcus (VRE) Z86.19    Anemia in end-stage renal disease ([facility]) N18.6, D63.1    Chronic hypotension I95.89    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 ([facility]) E11.22, N18.6    Secondary hyperparathyroidism of renal origin ([facility] [address] N25.81    Gastroesophageal reflux disease K21.9    History of recurrent urinary tract infection Z87.440    History of sepsis Z86.19    End-stage renal disease on hemodialysis (HCC) N18.6, Z99.2    History of hydronephrosis Z87.448    History of septic shock Z86.19    Anticoagulated by anticoagulation treatment Z79.01    Paroxysmal atrial fibrillation ([facility]) I48.0    Closed fracture of left inferior pubic ramus, with routine healing, subsequent encounter S32.426J    Closed nondisplaced fracture of anterior wall of left acetabulum with routine healing S32.415D    Closed fracture of superior pubic ramus, left, with routine healing, subsequent encounter S32.512D    Multiple closed pelvic fractures without disruption of pelvic ring ([facility]) S32.82XA    Depression F32. A    History of urinary tract infection Z87.440    Need for prophylactic isolation Z41.8    Hyperlipidemia E78.5    Hypothyroidism E03.9    History of kidney stones Z87.442    Chronic pain G89.29    Lung mass R91.8    History of urethral stricture Z87.448    Uric acid nephrolithiasis N20.0    Urinary incontinence R32    Glaucoma H40.9    Hyperphosphatemia E83.39    Mononeuropathy G58.9    Hyperkalemia E87.5    Gross hematuria R31.0    Impaired mobility and ADLs Z74.09, Z78.9    Stricture of female urethra N35.92    ESRD on dialysis ([facility]) N18.6, Z99.2    SOB (shortness of breath) on exertion R06.02    A-fib ([facility]) I48.91    Presence of Watchman left atrial appendage closure device Z95.818    Ureteral stricture N13.5    Bradycardia R00.1    Hypotension I95.9        RECOMMENDATIONS:   Neuro: pain medication restarted, cymbalta restarted, Ativan x 1 dose  Pulm: Supplemental O2 via NC, titrate flow for goal SPO2> 90% Bronchodilators, steroids, pulmonary hygiene care, Aspiration precautions, Keep HOB >30 degrees  CVS :Actively titrate vasopressors aim systolic >67 mmHg, hold rate controlling medications, BP/symptoms improving, cards to see tomorrow   GI: SUP,Zofran PRN for N/V, Diet- ADULT DIET Regular  Renal:  Trend Renal indices, consult nephro tomorrow   Hem/Onc: Monitor for s/o active bleeding. I/D:Sepsis bundle per hospital protocol, Blood and Urine cultures drawn and will be followed. Lactic acid ordered- initial and repeat Q4hrs till normalized. Antibiotics:cefepime, vanco Trend WBCs and temperature curve. Procal was low but wait or cx's to return before d/c abx. Resp viral panel.    Endocrine: Q6 glucoses, SSI. TSH normal   Metabolic:  Daily BMP, mag, phos. Trend lytes, replace as needed. Musc/Skin: no acute issues  DNR/DNI with signed post  Palliative consult- patient with chronic, debilitating disease processes, ?diabetic autonomic neuropathy if sycopal episoes, n/v, chronic hypotension do not improve with digoxin which carries high morbidity. Best practice :     Glycemic control  IHI ICU bundles:   Central Line Bundle Followed     [facility] patients-    N/a  Sress ulcer prophylaxis. Protonix  DVT prophylaxis. SQH  Need for Lines, chua assessed. Palliative care evaluation. Restraints need. Subjective/History: This patient has been seen and evaluated at the request of Dr. Lashonda Dillon for hypotension. 11/14/22    Patient is a 76 y.o. female w/ PMH of chronic hypotension, right hydonephrosis with stent, depression, afibb, DM2 with neuropathy, ESRD on HD presenting to [facility] with c/o pain, weakness, and SOB. Patient is visibly anxious and uncomfortable. History obtained from patient, chart, PFM, and son. Patient reports 3 days of diarrhea PTA. Son reports poor intake for mos with associated n/v. Son reports worsening decline since starting digoxin. PFM states her HR normally is a lot higher and BP in 39'I-026Z systolic in the office. Patient is currently complaining of leg cramps/ pain and restlessness. Patient is complaining of weakness and inability to stand to complete ADLs while at home. Also complaining of presyncope/lightheaded for a few weeks. She is no longer c/o CP or SOB but had this off and on while in the ED per ED notes. Patient states she is overall very uncomfortable. She expressed she is very frustrated with having to return to the hospital many times and has been offered hospice in the past. She states she is not ready for this because she is [place] coward\" but she also is exhausted from all the treatment and discomfort.              Past Medical History:   Diagnosis Date    Anemia in end-stage renal disease ([facility] [address] Anticoagulated by anticoagulation treatment     On Apixaban    Chronic hypotension     On Midodrine    Chronic kidney disease     ESRD on HD MWF    Chronic pain     Closed fracture of left inferior pubic ramus, with routine healing, subsequent encounter 11/12/2021    Closed fracture of superior pubic ramus, left, with routine healing, subsequent encounter 11/12/2021    Closed nondisplaced fracture of anterior wall of left acetabulum with routine healing 11/12/2021    Depression     End-stage renal disease on hemodialysis ([address]     HD at [facility] on MWF.  Tel # [phone]    Gastroesophageal reflux disease     Glaucoma     History of acute pyelonephritis 02/20/2020    History of hydronephrosis 10/05/2021    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    Urine culture (collected 10/8/2021, resulted 10/14/2021) yielded growth of >100,000 colonies/ml of Enterococcus faecalis RESISTANT to Ciprofloxacin, Levofloxacin, Tetracycline and Vancomycin    History of kidney stones     History of recurrent urinary tract infection     History of sepsis 06/18/2021    History of septic shock 10/08/2021    History of urethral stricture     History of urinary tract infection 10/08/2021    Urine culture (collected 10/8/2021, resulted 10/14/2021) yielded growth of >100,000 colonies/ml of Enterococcus faecalis RESISTANT to Ciprofloxacin, Levofloxacin, Tetracycline and Vancomycin    Hyperlipidemia     Hyperphosphatemia 11/14/2021    Hypothyroidism     Lung mass     Mononeuropathy     Involving ring finger of left hand    Need for prophylactic isolation 10/08/2021    Urine culture (collected 10/8/2021, resulted 10/14/2021) yielded growth of >100,000 colonies/ml of Enterococcus faecalis RESISTANT to Ciprofloxacin, Levofloxacin, Tetracycline and Vancomycin    Osteoporosis     Paroxysmal atrial fibrillation ([address]     Secondary hyperparathyroidism of renal origin ([address]     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 ([facility] [address]     HbA1c (10/8/2021) = 4.6    Uric acid nephrolithiasis     Urinary incontinence         Past Surgical History:   Procedure Laterality Date    HX APPENDECTOMY      HX CHOLECYSTECTOMY      HX GASTRIC BYPASS      Gastric stapling    HX KNEE ARTHROSCOPY      HX UROLOGICAL      right PCN placement    HX UROLOGICAL  07/23/2018    RIGHT URETEROSCOPY WITH HOLMIUM LASER    IR EXCHANGE NEPHRO PERC LT SI  2/21/2020    IR EXCHANGE NEPHRO PERC RT SI  4/13/2020    IR EXCHANGE NEPHRO PERC RT SI  7/17/2020    IR NEPHROSTOMY PERC RT PLC CATH  SI  10/14/2020    IR NEPHROURETERAL PERC RT PLC CATH NEW ACCESS  SI  4/30/2020    UT INTRO CATH DIALYSIS CIRCUIT DX ANGRPH FLUOR S&I Left 9/24/2020    FISTULOGRAM LEFT/poss permanent catheter placement performed by Matthew Leonard MD at [facility] C[facility]    VASCULAR SURGERY PROCEDURE UNLIST      lef AVF        Prior to Admission medications    Medication Sig Start Date End Date Taking? Authorizing Provider   metoprolol tartrate (LOPRESSOR) 25 mg tablet TAKE 1/2 (ONE-HALF) TABLET BY MOUTH TWICE DAILY 10/19/22   Provider, Historical   digoxin (LANOXIN) 0.125 mg tablet TAKE 1 TABLET BY MOUTH EVERY OTHER DAY (NON DIALYSIS DAYS) 10/19/22   Provider, Historical   aspirin delayed-release 81 mg tablet Take 1 Tablet by mouth daily. 8/18/22   Sonali Avalos MD   clopidogreL (Plavix) 75 mg tab Take 1 Tablet by mouth in the morning. 8/15/22   Sonali Avalos MD   gabapentin (NEURONTIN) 100 mg capsule Take 1 Capsule by mouth nightly. Max Daily Amount: 100 mg. Indications: concern for back pain post dialysis 5/27/22   Reina Dave MD   melatonin 5 mg tablet Take 5 mg by mouth nightly. Provider, Historical   HYDROmorphone (DILAUDID) 2 mg tablet Take 1 mg by mouth every eight (8) hours as needed for Pain.  Take 1/2 tablet by mouth every 8 hours as needed for pain level of 5 out of 10 or greater    Provider, Historical   allopurinoL (ZYLOPRIM) 100 mg tablet Take 1 Tablet by mouth every Monday, Wednesday, Friday. [after hemodialysis]  Indications: treatment to prevent acute gout attack 12/3/21   Hortensia Sandoval MD   amiodarone (CORDARONE) 200 mg tablet Take 1 Tablet by mouth daily. Indications: prevention of recurrent atrial fibrillation 12/3/21   Hortensia Sandoval MD   sevelamer carbonate (RENVELA) 800 mg tab tab Take 2 Tablets by mouth three (3) times daily (with meals). Indications: renal osteodystrophy with hyperphosphatemia 12/3/21   Hortensia Sandoval MD   acetaminophen (Tylenol Extra Strength) 500 mg tablet Take 1 Tablet by mouth every six (6) hours as needed for Pain. 11/7/21   Jasson Dickson MD   meclizine (ANTIVERT) 25 mg tablet Take 25 mg by mouth three (3) times daily as needed for Dizziness. 3/3/21   Provider, Historical   DULoxetine (CYMBALTA) 20 mg capsule Take 20 mg by mouth daily. Provider, Historical   estradioL (Estrace) 0.01 % (0.1 mg/gram) vaginal cream Apply a fingertip amount around the urethra three times a week. 9/30/20   Sonny Scott MD   biotin 1,000 mcg chew Take 1 Tab by mouth daily. Provider, Historical   cyanocobalamin 1,000 mcg tablet Take 1,000 mcg by mouth daily. Provider, Historical   lactobacillus sp. 50 billion cpu (BIO-K PLUS) 50 billion cell -375 mg cap capsule Take 1 Cap by mouth daily. 2/25/20   Niki Fournier MD   ascorbic acid, vitamin C, (VITAMIN C) 500 mg tablet Take 500 mg by mouth daily. Lexus Hogan MD   cholecalciferol (VITAMIN D3) (2,000 UNITS /50 MCG) cap capsule Take 2,000 Units by mouth two (2) times a day. Lexus Hogan MD   latanoprost (XALATAN) 0.005 % ophthalmic solution Administer 1 Drop to both eyes nightly. One drop at bedtime    Lexus Hogan MD   levothyroxine (SYNTHROID) 125 mcg tablet Take 125 mcg by mouth Daily (before breakfast). Lxeus Hogan MD   omeprazole (PRILOSEC) 20 mg capsule Take 20 mg by mouth daily.     Lexus Hogan MD   ondansetron (ZOFRAN ODT) 4 mg disintegrating tablet Take 4 mg by mouth every eight (8) hours as needed for Nausea or Vomiting. Lexus Hogan MD   vit B Cmplx 3-FA-Vit C-Biotin (NEPHRO HOWIE RX) 1- mg-mg-mcg tablet Take 1 Tab by mouth daily. Lexus Hogan MD       Current Facility-Administered Medications   Medication Dose Route Frequency    midodrine (PROAMATINE) tablet 10 mg  10 mg Oral TID WITH MEALS    vancomycin (VANCOCIN) 2000 mg in  ml infusion  2,000 mg IntraVENous ONCE    Vancomycin: Pharmacy to dose   Other Rx Dosing/Monitoring    [START ON 11/15/2022] cefepime (MAXIPIME) 1 g in 0.9% sodium chloride (MBP/ADV) 50 mL MBP  1 g IntraVENous Q24H    NOREPINephrine (LEVOPHED) 8 mg in 5% dextrose 250mL (32 mcg/mL) infusion  0.5-50 mcg/min IntraVENous TITRATE    sodium chloride (NS) flush 5-40 mL  5-40 mL IntraVENous Q8H    heparin (porcine) injection 5,000 Units  5,000 Units SubCUTAneous Q8H    [START ON 11/15/2022] DULoxetine (CYMBALTA) capsule 20 mg  20 mg Oral DAILY    [START ON 11/15/2022] famotidine (PF) (PEPCID) 20 mg in 0.9% sodium chloride 10 mL injection  20 mg IntraVENous Q24H       Allergies   Allergen Reactions    Albumin, Human 25 % Itching     Headache - severe migraine like, itchy eyes, runny nose    Ciprofloxacin Hives    Cyclopentolate Unknown (comments)    Iron Sucrose Diarrhea    Statins-Hmg-Coa Reductase Inhibitors Other (comments)     Body ache        Social History     Tobacco Use    Smoking status: Never    Smokeless tobacco: Never   Substance Use Topics    Alcohol use: Never        Family History   Problem Relation Age of Onset    Heart Surgery Sister           Review of Systems:  A comprehensive review of systems was negative except for that written in the HPI.     Objective:   Vital Signs:    Visit Vitals  BP (!) 82/47 (BP 1 Location: Right leg, BP Patient Position: At rest;Sitting)   Pulse (!) 56   Temp 98.2 °F (36.8 °C)   Resp 22   Ht 5' 2\" (1.575 m)   Wt 85 kg (187 lb 6.3 oz)   SpO2 100%   BMI 34.27 kg/m²       O2 Device: Nasal cannula   O2 Flow Rate (L/min): 2 l/min   Temp (24hrs), Av.2 °F (36.8 °C), Min:98.2 °F (36.8 °C), Max:98.2 °F (36.8 °C)       Intake/Output:   Last shift:      No intake/output data recorded. Last 3 shifts: No intake/output data recorded. No intake or output data in the 24 hours ending 22 1393        Physical Exam:     General:  Alert, cooperative, mild distress. Head:  Normocephalic, without obvious abnormality, atraumatic. Eyes:  Conjunctivae/corneas clear. PERRL,   Nose: Nares normal. Septum midline. Mucosa normal. No drainage or sinus tenderness. Throat: Lips, mucosa, and tongue normal. Teeth and gums normal.   Neck: Supple, symmetrical, trachea midline, no adenopathy   Back:   Symmetric, no curvature. ROM normal.   Lungs:   Clear to auscultation bilaterally. Chest wall:  No tenderness or deformity. Heart:  bradycardia S1, S2 normal, no murmur, click, rub or gallop. Abdomen:   Soft, non-tender. Bowel sounds normal. No masses,  No organomegaly. Extremities: Extremities normal, atraumatic, no cyanosis or edema. Left upper arm fistula    Pulses: Diminished equally    Skin: Skin color, texture, turgor normal. No rashes or lesions   Lymph nodes:  Cervical, supraclavicular, and axillary nodes normal.   Neurologic: Atrophy of abductor policis, AOx4. Able to sit up without assistance. Restless.           Data:     Recent Results (from the past 24 hour(s))   CBC WITH AUTOMATED DIFF    Collection Time: 22  1:30 PM   Result Value Ref Range    WBC 8.9 4.6 - 13.2 K/uL    RBC 3.12 (L) 4.20 - 5.30 M/uL    HGB 10.4 (L) 12.0 - 16.0 g/dL    HCT 32.3 (L) 35.0 - 45.0 %    .5 (H) 78.0 - 100.0 FL    MCH 33.3 24.0 - 34.0 PG    MCHC 32.2 31.0 - 37.0 g/dL    RDW 14.7 (H) 11.6 - 14.5 %    PLATELET 253 120 - 165 K/uL    MPV 10.2 9.2 - 11.8 FL    NRBC 0.6 (H) 0  WBC    ABSOLUTE NRBC 0.05 (H) 0.00 - 0.01 K/uL    NEUTROPHILS 63 40 - 73 %    LYMPHOCYTES 21 21 - 52 %    MONOCYTES 12 (H) 3 - 10 % EOSINOPHILS 1 0 - 5 %    BASOPHILS 1 0 - 2 %    IMMATURE GRANULOCYTES 3 (H) 0.0 - 0.5 %    ABS. NEUTROPHILS 5.6 1.8 - 8.0 K/UL    ABS. LYMPHOCYTES 1.8 0.9 - 3.6 K/UL    ABS. MONOCYTES 1.1 0.05 - 1.2 K/UL    ABS. EOSINOPHILS 0.1 0.0 - 0.4 K/UL    ABS. BASOPHILS 0.1 0.0 - 0.1 K/UL    ABS. IMM. GRANS. 0.2 (H) 0.00 - 0.04 K/UL    DF AUTOMATED     PROTHROMBIN TIME + INR    Collection Time: 11/14/22  1:30 PM   Result Value Ref Range    Prothrombin time 13.9 11.5 - 15.2 sec    INR 1.0 0.8 - 1.2     METABOLIC PANEL, COMPREHENSIVE    Collection Time: 11/14/22  1:30 PM   Result Value Ref Range    Sodium 139 136 - 145 mmol/L    Potassium 3.8 3.5 - 5.5 mmol/L    Chloride 101 100 - 111 mmol/L    CO2 28 21 - 32 mmol/L    Anion gap 10 3.0 - 18 mmol/L    Glucose 147 (H) 74 - 99 mg/dL    BUN 15 7.0 - 18 MG/DL    Creatinine 5.42 (H) 0.6 - 1.3 MG/DL    BUN/Creatinine ratio 3 (L) 12 - 20      eGFR 8 (L) >60 ml/min/1.73m2    Calcium 9.1 8.5 - 10.1 MG/DL    Bilirubin, total 0.6 0.2 - 1.0 MG/DL    ALT (SGPT) 15 13 - 56 U/L    AST (SGOT) 11 10 - 38 U/L    Alk.  phosphatase 107 45 - 117 U/L    Protein, total 6.8 6.4 - 8.2 g/dL    Albumin 3.3 (L) 3.4 - 5.0 g/dL    Globulin 3.5 2.0 - 4.0 g/dL    A-G Ratio 0.9 0.8 - 1.7     TROPONIN-HIGH SENSITIVITY    Collection Time: 11/14/22  1:30 PM   Result Value Ref Range    Troponin-High Sensitivity 15 0 - 54 ng/L   MAGNESIUM    Collection Time: 11/14/22  1:30 PM   Result Value Ref Range    Magnesium 1.8 1.6 - 2.6 mg/dL   DIGOXIN    Collection Time: 11/14/22  1:30 PM   Result Value Ref Range    Digoxin level 1.6 0.9 - 2.0 NG/ML   TSH 3RD GENERATION    Collection Time: 11/14/22  1:30 PM   Result Value Ref Range    TSH 3.14 0.36 - 3.74 uIU/mL   EKG, 12 LEAD, INITIAL    Collection Time: 11/14/22  1:40 PM   Result Value Ref Range    Ventricular Rate 50 BPM    Atrial Rate 64 BPM    QRS Duration 94 ms    Q-T Interval 418 ms    QTC Calculation (Bezet) 381 ms    Calculated R Axis -27 degrees    Calculated T Axis -135 degrees    Diagnosis       Probable Junctional rhythm  ST & T wave abnormality, consider inferolateral ischemia  Abnormal ECG  When compared with ECG of 08-NOV-2022 13:37,  Junctional rhythm has replaced Atrial fibrillation  Minimal criteria for Anterior infarct are no longer present  Inverted T waves have replaced nonspecific T wave abnormality in Lateral   leads  QT has shortened  Confirmed by Tayo Nayak MD, --- ([id number]) on 11/14/2022 4:50:01 PM     IONIZED CALCIUM    Collection Time: 11/14/22  1:48 PM   Result Value Ref Range    Calcium, ionized 1.09 (L) 1.12 - 1.32 mmol/L   TROPONIN-HIGH SENSITIVITY    Collection Time: 11/14/22  3:45 PM   Result Value Ref Range    Troponin-High Sensitivity 14 0 - 54 ng/L   PROCALCITONIN    Collection Time: 11/14/22  3:45 PM   Result Value Ref Range    Procalcitonin 0.30 ng/mL   POC LACTIC ACID    Collection Time: 11/14/22  5:24 PM   Result Value Ref Range    Lactic Acid (POC) 1.19 0.40 - 2.00 mmol/L   TROPONIN-HIGH SENSITIVITY    Collection Time: 11/14/22  7:21 PM   Result Value Ref Range    Troponin-High Sensitivity 15 0 - 54 ng/L   EKG, 12 LEAD, SUBSEQUENT    Collection Time: 11/14/22  7:24 PM   Result Value Ref Range    Ventricular Rate 56 BPM    QRS Duration 86 ms    Q-T Interval 410 ms    QTC Calculation (Bezet) 395 ms    Calculated R Axis -27 degrees    Calculated T Axis -175 degrees    Diagnosis       Junctional rhythm with premature supraventricular complexes in a pattern of   bigeminy  Low voltage QRS  Nonspecific ST and T wave abnormality  Abnormal ECG  When compared with ECG of 14-NOV-2022 13:40,  premature supraventricular complexes are now present     POC LACTIC ACID    Collection Time: 11/14/22  7:55 PM   Result Value Ref Range    Lactic Acid (POC) 2.04 (HH) 0.40 - 9.03 mmol/L   METABOLIC PANEL, BASIC    Collection Time: 11/14/22  8:26 PM   Result Value Ref Range    Sodium 137 136 - 145 mmol/L    Potassium 5.2 3.5 - 5.5 mmol/L    Chloride 102 100 - 111 mmol/L CO2 27 21 - 32 mmol/L    Anion gap 8 3.0 - 18 mmol/L    Glucose 109 (H) 74 - 99 mg/dL    BUN 17 7.0 - 18 MG/DL    Creatinine 5.79 (H) 0.6 - 1.3 MG/DL    BUN/Creatinine ratio 3 (L) 12 - 20      eGFR 7 (L) >60 ml/min/1.73m2    Calcium 8.5 8.5 - 10.1 MG/DL           No results for input(s): FIO2I, IFO2, HCO3I, IHCO3, HCOPOC, PCO2I, PCOPOC, IPHI, PHI, PHPOC, PO2I, PO2POC in the last 72 hours. No lab exists for component: IPOC2    Telemetry: bradycardia    Imaging:  I have personally reviewed the patients radiographs and have reviewed the reports:    XR Results (most recent):  Results from Hospital Encounter encounter on 11/14/22    XR CHEST PORT    Narrative  EXAM: XR CHEST PORT    INDICATION: chest pain    COMPARISON: 10/21/2022. FINDINGS: A single view of the chest demonstrates chronic linear scarring  without change. No new lung findings. Stable elevated right hemidiaphragm. .  Stable cardiac silhouette. . No acute bone findings. .    Impression  No acute findings or interval change. CT Results (most recent):  Results from [place] encounter on 10/21/22    CT CHEST ABD PELV WO CONT    Narrative  CT CHEST,  ABDOMEN AND PELVIS WITH CONTRAST    COMPARISON: The pelvis March 2022, CT chest abdomen and pelvis November 2021    INDICATIONS: Abdominal pain and chest pain on dialysis    TECHNIQUE:      Scanning was done with a multislice scanner. Volumetric data acquisition was  performed through the chest, abdomen, and pelvis. Reconstructions were created  in the axial, coronal, and sagittal planes. .    CT CHEST FINDINGS:    The lungs are clear. There is no pneumothorax or pleural fluid. The base of the neck is unremarkable. The cardiomediastinal structures appear unremarkable . CT ABDOMEN FINDINGS:    Liver: Previous cholecystectomy. No lesions evident  Spleen: Negative. Small accessory splenule noted. .  Left Kidney: Atrophic with small cysts evident. .  Right Kidney: Atrophic. Small cysts evident. Ureteral stent present extending to  the bladder from the contracted renal pelvis. Pancreas: Normal.  Adrenal Glands: Negative. Stomach, Small Bowel And Colon: Negative. Lymph Nodes: There is no pathologic-appearing adenopathy evident. Peritoneal Spaces: There is no free fluid or free air. The bladder is incompletely distended but unremarkable. The uterus and adnexal structures are unremarkable      Osseous Structures Of Abdomen And Pelvis: No acute or aggressive findings are  evident. .    Impression  No acute abnormalities are identified in the chest abdomen or pelvis. All CT scans at this facility are performed using dose optimization technique as  appropriate to the performed exam, to include automated exposure control,  adjustment of the mA and/or kV according to patient's size (Including  appropriate matching for site-specific examinations), or use of iterative  reconstruction technique. Dry                     Total of   65  min critical care time spent at bedside during the course of care providing evaluation,management and care decisions and ordering appropriate treatment related to critical care problems exclusive of procedures. The reason for providing this level of medical care for this critically ill patient was due a critical illness that impaired one or more vital organ systems such that there was a high probability of imminent or life threatening deterioration in the patients condition. This care involved high complexity decision making to assess, manipulate, and support vital system functions, to treat this degree vital organ system failure and to prevent further life threatening deterioration of the patients condition.         Lionel Shah PA-C  11/14/22  Pulmonary, Critical Care Medicine  [address] [place] [address] [facility]

## 2022-11-15 NOTE — CONSULTS
Palliative Medicine Consult    Patient Name: Fely Humphrey  YOB: 1945    Date of Initial Consult: November 15, 2022  Reason for Consult: goals of care discussion and hospice discussion  Requesting Provider: ICU team  Primary Care Physician: Gerald Rodriguez MD      SUMMARY:     Fely Humphrey is a 76 y.o. with a past history of chronic hypotension, right hydronephrosis with stent, depression, A. fib, diabetes with neuropathy and ESRD on hemodialysis, who was admitted on 11/14/2022 from home with a diagnosis of acute on chronic hypotension, symptomatic bradycardia, diarrhea and hallucination. Reportedly, patient had nausea vomiting and diarrhea since She was started on digoxin. She started having significant weakness and decided to come to the emergency room. In the emergency room patient was noted to have hypotension and bradycardia. Was started on Levophed and dopamine. Patient expressed her wish to ICU team about stopping all the treatment and wanted to talk to hospice. Current medical issues leading to Palliative Medicine involvement include: To discuss goals of care including hospice discussion. 11/15/22: Patient was seen in presence of palliative care staff members. Patient is able to carry on conversation without any issues. Denies headaches or dizziness. Denies any chest pain or abdominal pain. Dyspnea on exertion present. No nausea or vomiting currently. She states she has been on dialysis for last 2 years. She lives with her son and daughter-in-law. Her dialysis doctor's name is Dr. Ericka Barron. PALLIATIVE DIAGNOSES:   Goals of care discussion with hospice care discussion  2. ESRD on hemodialysis  3. Acute on chronic hypotension requiring IV pressors  4. Sinus bradycardia  5.   Poor functional status    Patient Active Problem List   Diagnosis Code    History of acute pyelonephritis Z87.448    History of infection with vancomycin resistant Enterococcus (VRE) Z86.19    Anemia in end-stage renal disease (HCC) N18.6, D63.1    Chronic hypotension I95.89    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 ([facility]) E11.22, N18.6    Secondary hyperparathyroidism of renal origin ([facility] N25.81    Gastroesophageal reflux disease K21.9    History of recurrent urinary tract infection Z87.440    History of sepsis Z86.19    End-stage renal disease on hemodialysis ([facility]) N18.6, Z99.2    History of hydronephrosis Z87.448    History of septic shock Z86.19    Anticoagulated by anticoagulation treatment Z79.01    Paroxysmal atrial fibrillation ([facility]) I48.0    Closed fracture of left inferior pubic ramus, with routine healing, subsequent encounter S32.592D    Closed nondisplaced fracture of anterior wall of left acetabulum with routine healing S32.415D    Closed fracture of superior pubic ramus, left, with routine healing, subsequent encounter S32.512D    Multiple closed pelvic fractures without disruption of pelvic ring ([facility] S32.82XA    Depression F32. A    History of urinary tract infection Z87.440    Need for prophylactic isolation Z41.8    Hyperlipidemia E78.5    Hypothyroidism E03.9    History of kidney stones Z87.442    Chronic pain G89.29    Lung mass R91.8    History of urethral stricture Z87.448    Uric acid nephrolithiasis N20.0    Urinary incontinence R32    Glaucoma H40.9    Hyperphosphatemia E83.39    Mononeuropathy G58.9    Hyperkalemia E87.5    Gross hematuria R31.0    Impaired mobility and ADLs Z74.09, Z78.9    Stricture of female urethra N35.92    ESRD on dialysis ([facility]) N18.6, Z99.2    SOB (shortness of breath) on exertion R06.02    A-fib ([facility]) I48.91    Presence of Watchman left atrial appendage closure device Z95.818    Ureteral stricture N13.5    Bradycardia R00.1    Hypotension I95.9          GOALS OF CARE / TREATMENT PREFERENCES:     GOALS OF CARE:    11/15/22: Patient was seen in presence of palliative care staff members. Patient is awake and oriented x3.   We introduced ourselves and explained her the purpose of our visit. Patient is able to carry on conversation. Patient has AMD and post form on the file. She wants her son and daughter-in-law to be medical power of  and understands the meaning of DNR/DNI. She wants to continue with current AMD in the post form. Patient stated that she is tired of being on dialysis and keep coming back and forth to the hospital.  We discussed with her about consequences of stopping dialysis including worsening symptoms in form of shortness of breath, delirium, nausea/vomiting etc.  She verbalized understanding about it. She said she has been on dialysis for last 2 years and does not think it is helping her. She lives with her son. She wants us to call her son to discuss the options about hospice at home versus nursing home. She is interested in talking to hospice for informational purposes only at this point. She also mentioned that she will talk to her son and daughter-in-law about her wheezes. She gave us permission to talk to her son. I called patient's son and informed him about the conversation we had with his mother. As per son: Patient goes in this phase off and on and wanted to make sure she understands the consequences of stopping dialysis. He stated he and his wife will come later on today to talk to patient but requested us not to start any other process until they talk to his mother. He is fine having hospice to provide information only. I did state to him that this is his mother's wish but we will hold on making any other decisions until he and his wife talk to patient and we will follow-up with patient tomorrow morning. He is fine with it. Plan: Patient will remain DNR/DNI, limited intervention at this point, hospice consult for information purposes only, will continue to follow this patient with you.       TREATMENT PREFERENCES:   Code Status: DNR    Advance Care Planning:  Advance Care Planning 11/15/2022   Patient's Healthcare Decision Maker is: -   Confirm Advance Directive Yes, on file   Patient Would Like to Complete Advance Directive -   Does the patient have other document types Do Not Resuscitate          Other Instructions: PT and OT        HISTORY:     History obtained from: Patient    CHIEF COMPLAINT: Nausea vomiting and diarrhea    HPI/SUBJECTIVE:    The patient is:   [x] Verbal and participatory  [] Non-participatory due to:         FUNCTIONAL ASSESSMENT:     Palliative Performance Scale (PPS):40       Clinical Pain Assessment (nonverbal scale for severity on nonverbal patients):              PSYCHOSOCIAL/SPIRITUAL SCREENING:       Any spiritual / Synagogue concerns:  [] Yes /  [x] No    Caregiver Burnout:  [] Yes /  [] No /  [x] No Caregiver Present      Anticipatory grief assessment:   [] Normal  / [] Maladaptive          REVIEW OF SYSTEMS:     Positive and pertinent negative findings in ROS are noted above in HPI. The following systems were [x] reviewed / [] unable to be reviewed as noted in HPI  Other findings are noted below. Systems: constitutional, ears/nose/mouth/throat, respiratory, gastrointestinal, genitourinary, musculoskeletal, integumentary, neurologic, psychiatric, endocrine. Positive findings noted below. Modified ESAS Completed by: provider                                            PHYSICAL EXAM:     From RN flowsheet:  Wt Readings from Last 3 Encounters:   11/14/22 85 kg (187 lb 6.3 oz)   11/08/22 91.6 kg (202 lb)   11/01/22 91.6 kg (202 lb)       BP (!) 115/54   Pulse 60   Temp 98.4 °F (36.9 °C)   Resp 20   Ht 5' 2\" (1.575 m)   Wt 85 kg (187 lb 6.3 oz)   SpO2 97%   BMI 34.27 kg/m²     Constitutional: Awake, oriented x3, not in acute distress, follows verbal commands appropriately.   Eyes: pupils equal, anicteric  ENMT: no nasal discharge, moist mucous membranes  Cardiovascular: 1 S2 heard  Respiratory: Diminished breath sound bibasilar without any wheezes currently  Gastrointestinal: soft non-tender, +bowel sounds  Musculoskeletal: No pitting pedal edema, no deformity, no tenderness to palpation  Skin: warm, dry  Neurologic: following commands, moving all extremities  Psychiatric: full affect, no hallucinations, no agitation currently  Other:     HISTORY:     Past Medical History:   Diagnosis Date    Anemia in end-stage renal disease ([facility]     Anticoagulated by anticoagulation treatment     On Apixaban    Chronic hypotension     On Midodrine    Chronic kidney disease     ESRD on HD MWF    Chronic pain     Closed fracture of left inferior pubic ramus, with routine healing, subsequent encounter 11/12/2021    Closed fracture of superior pubic ramus, left, with routine healing, subsequent encounter 11/12/2021    Closed nondisplaced fracture of anterior wall of left acetabulum with routine healing 11/12/2021    Depression     End-stage renal disease on hemodialysis ([facility]     HD [facility] on MWF.  Tel # [phone]    Gastroesophageal reflux disease     Glaucoma     History of acute pyelonephritis 02/20/2020    History of hydronephrosis 10/05/2021    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    Urine culture (collected 10/8/2021, resulted 10/14/2021) yielded growth of >100,000 colonies/ml of Enterococcus faecalis RESISTANT to Ciprofloxacin, Levofloxacin, Tetracycline and Vancomycin    History of kidney stones     History of recurrent urinary tract infection     History of sepsis 06/18/2021    History of septic shock 10/08/2021    History of urethral stricture     History of urinary tract infection 10/08/2021    Urine culture (collected 10/8/2021, resulted 10/14/2021) yielded growth of >100,000 colonies/ml of Enterococcus faecalis RESISTANT to Ciprofloxacin, Levofloxacin, Tetracycline and Vancomycin    Hyperlipidemia     Hyperphosphatemia 11/14/2021    Hypothyroidism     Lung mass     Mononeuropathy     Involving ring finger of left hand    Need for prophylactic isolation 10/08/2021    Urine culture (collected 10/8/2021, resulted 10/14/2021) yielded growth of >100,000 colonies/ml of Enterococcus faecalis RESISTANT to Ciprofloxacin, Levofloxacin, Tetracycline and Vancomycin    Osteoporosis     Paroxysmal atrial fibrillation (HCC)     Secondary hyperparathyroidism of renal origin ([facility]     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 ([facility]     HbA1c (10/8/2021) = 4.6    Uric acid nephrolithiasis     Urinary incontinence       Past Surgical History:   Procedure Laterality Date    HX APPENDECTOMY      HX CHOLECYSTECTOMY      HX GASTRIC BYPASS      Gastric stapling    HX KNEE ARTHROSCOPY      HX UROLOGICAL      right PCN placement    HX UROLOGICAL  07/23/2018    RIGHT URETEROSCOPY WITH HOLMIUM LASER    IR EXCHANGE NEPHRO PERC LT SI  2/21/2020    IR EXCHANGE NEPHRO PERC RT SI  4/13/2020    IR EXCHANGE NEPHRO PERC RT SI  7/17/2020    IR NEPHROSTOMY PERC RT PLC CATH  SI  10/14/2020    IR NEPHROURETERAL PERC RT PLC CATH NEW ACCESS  SI  4/30/2020    WI INTRO CATH DIALYSIS CIRCUIT DX ANGRPH FLUOR S&I Left 9/24/2020    FISTULOGRAM LEFT/poss permanent catheter placement performed by Jean Cobos MD at [facility] CATH LAB    VASCULAR SURGERY PROCEDURE UNLIST      lef AVF      Family History   Problem Relation Age of Onset    Heart Surgery Sister       History reviewed, no pertinent family history.   Social History     Tobacco Use    Smoking status: Never    Smokeless tobacco: Never   Substance Use Topics    Alcohol use: Never     Allergies   Allergen Reactions    Albumin, Human 25 % Itching     Headache - severe migraine like, itchy eyes, runny nose    Ciprofloxacin Hives    Cyclopentolate Unknown (comments)    Iron Sucrose Diarrhea    Statins-Hmg-Coa Reductase Inhibitors Other (comments)     Body ache      Current Facility-Administered Medications   Medication Dose Route Frequency    DOPamine (INTROPIN) 800 mg in dextrose 5% 500 mL infusion  5-20 mcg/kg/min IntraVENous TITRATE    epoetin caitlin-epbx (RETACRIT) injection 8,000 Units  8,000 Units SubCUTAneous Q TUE, THU & SAT    insulin lispro (HUMALOG) injection   SubCUTAneous AC&HS    glucose chewable tablet 16 g  4 Tablet Oral PRN    glucagon (GLUCAGEN) injection 1 mg  1 mg IntraMUSCular PRN    dextrose 10% infusion 0-250 mL  0-250 mL IntraVENous PRN    [Held by provider] midodrine (PROAMATINE) tablet 10 mg  10 mg Oral TID WITH MEALS    Vancomycin: Pharmacy to dose   Other Rx Dosing/Monitoring    cefepime (MAXIPIME) 1 g in 0.9% sodium chloride (MBP/ADV) 50 mL MBP  1 g IntraVENous Q24H    NOREPINephrine (LEVOPHED) 8 mg in 5% dextrose 250mL (32 mcg/mL) infusion  0.5-50 mcg/min IntraVENous TITRATE    sodium chloride (NS) flush 5-40 mL  5-40 mL IntraVENous Q8H    sodium chloride (NS) flush 5-40 mL  5-40 mL IntraVENous PRN    acetaminophen (TYLENOL) tablet 650 mg  650 mg Oral Q6H PRN    Or    acetaminophen (TYLENOL) suppository 650 mg  650 mg Rectal Q6H PRN    polyethylene glycol (MIRALAX) packet 17 g  17 g Oral DAILY PRN    ondansetron (ZOFRAN ODT) tablet 4 mg  4 mg Oral Q8H PRN    Or    ondansetron (ZOFRAN) injection 4 mg  4 mg IntraVENous Q6H PRN    heparin (porcine) injection 5,000 Units  5,000 Units SubCUTAneous Q8H    morphine IR (MS IR) tablet 7.5 mg  7.5 mg Oral Q6H PRN    DULoxetine (CYMBALTA) capsule 20 mg  20 mg Oral DAILY    famotidine (PF) (PEPCID) 20 mg in 0.9% sodium chloride 10 mL injection  20 mg IntraVENous Q24H    thiamine (B-1) 200 mg in 0.9% sodium chloride 50 mL IVPB  200 mg IntraVENous DAILY    folic acid (FOLVITE) tablet 1 mg  1 mg Oral DAILY        LAB AND IMAGING FINDINGS:     Recent Results (from the past 24 hour(s))   CBC WITH AUTOMATED DIFF    Collection Time: 11/14/22  1:30 PM   Result Value Ref Range    WBC 8.9 4.6 - 13.2 K/uL    RBC 3.12 (L) 4.20 - 5.30 M/uL    HGB 10.4 (L) 12.0 - 16.0 g/dL    HCT 32.3 (L) 35.0 - 45.0 %    .5 (H) 78.0 - 100.0 FL    MCH 33.3 24.0 - 34.0 PG    MCHC 32.2 31.0 - 37.0 g/dL    RDW 14.7 (H) 11.6 - 14.5 %    PLATELET 068 104 - 281 K/uL    MPV 10.2 9.2 - 11.8 FL    NRBC 0.6 (H) 0  WBC    ABSOLUTE NRBC 0.05 (H) 0.00 - 0.01 K/uL    NEUTROPHILS 63 40 - 73 %    LYMPHOCYTES 21 21 - 52 %    MONOCYTES 12 (H) 3 - 10 %    EOSINOPHILS 1 0 - 5 %    BASOPHILS 1 0 - 2 %    IMMATURE GRANULOCYTES 3 (H) 0.0 - 0.5 %    ABS. NEUTROPHILS 5.6 1.8 - 8.0 K/UL    ABS. LYMPHOCYTES 1.8 0.9 - 3.6 K/UL    ABS. MONOCYTES 1.1 0.05 - 1.2 K/UL    ABS. EOSINOPHILS 0.1 0.0 - 0.4 K/UL    ABS. BASOPHILS 0.1 0.0 - 0.1 K/UL    ABS. IMM. GRANS. 0.2 (H) 0.00 - 0.04 K/UL    DF AUTOMATED     PROTHROMBIN TIME + INR    Collection Time: 11/14/22  1:30 PM   Result Value Ref Range    Prothrombin time 13.9 11.5 - 15.2 sec    INR 1.0 0.8 - 1.2     METABOLIC PANEL, COMPREHENSIVE    Collection Time: 11/14/22  1:30 PM   Result Value Ref Range    Sodium 139 136 - 145 mmol/L    Potassium 3.8 3.5 - 5.5 mmol/L    Chloride 101 100 - 111 mmol/L    CO2 28 21 - 32 mmol/L    Anion gap 10 3.0 - 18 mmol/L    Glucose 147 (H) 74 - 99 mg/dL    BUN 15 7.0 - 18 MG/DL    Creatinine 5.42 (H) 0.6 - 1.3 MG/DL    BUN/Creatinine ratio 3 (L) 12 - 20      eGFR 8 (L) >60 ml/min/1.73m2    Calcium 9.1 8.5 - 10.1 MG/DL    Bilirubin, total 0.6 0.2 - 1.0 MG/DL    ALT (SGPT) 15 13 - 56 U/L    AST (SGOT) 11 10 - 38 U/L    Alk.  phosphatase 107 45 - 117 U/L    Protein, total 6.8 6.4 - 8.2 g/dL    Albumin 3.3 (L) 3.4 - 5.0 g/dL    Globulin 3.5 2.0 - 4.0 g/dL    A-G Ratio 0.9 0.8 - 1.7     TROPONIN-HIGH SENSITIVITY    Collection Time: 11/14/22  1:30 PM   Result Value Ref Range    Troponin-High Sensitivity 15 0 - 54 ng/L   MAGNESIUM    Collection Time: 11/14/22  1:30 PM   Result Value Ref Range    Magnesium 1.8 1.6 - 2.6 mg/dL   DIGOXIN    Collection Time: 11/14/22  1:30 PM   Result Value Ref Range    Digoxin level 1.6 0.9 - 2.0 NG/ML   TSH 3RD GENERATION    Collection Time: 11/14/22  1:30 PM   Result Value Ref Range    TSH 3.14 0.36 - 3.74 uIU/mL EKG, 12 LEAD, INITIAL    Collection Time: 11/14/22  1:40 PM   Result Value Ref Range    Ventricular Rate 50 BPM    Atrial Rate 64 BPM    QRS Duration 94 ms    Q-T Interval 418 ms    QTC Calculation (Bezet) 381 ms    Calculated R Axis -27 degrees    Calculated T Axis -135 degrees    Diagnosis       Probable Junctional rhythm  ST & T wave abnormality, consider inferolateral ischemia  Abnormal ECG  When compared with ECG of 08-NOV-2022 13:37,  Junctional rhythm has replaced Atrial fibrillation  Minimal criteria for Anterior infarct are no longer present  Inverted T waves have replaced nonspecific T wave abnormality in Lateral   leads  QT has shortened  Confirmed by Josh Banda MD, --- ([id number]) on 11/14/2022 4:50:01 PM     IONIZED CALCIUM    Collection Time: 11/14/22  1:48 PM   Result Value Ref Range    Calcium, ionized 1.09 (L) 1.12 - 1.32 mmol/L   TROPONIN-HIGH SENSITIVITY    Collection Time: 11/14/22  3:45 PM   Result Value Ref Range    Troponin-High Sensitivity 14 0 - 54 ng/L   PROCALCITONIN    Collection Time: 11/14/22  3:45 PM   Result Value Ref Range    Procalcitonin 0.30 ng/mL   POC LACTIC ACID    Collection Time: 11/14/22  5:24 PM   Result Value Ref Range    Lactic Acid (POC) 1.19 0.40 - 2.00 mmol/L   TROPONIN-HIGH SENSITIVITY    Collection Time: 11/14/22  7:21 PM   Result Value Ref Range    Troponin-High Sensitivity 15 0 - 54 ng/L   EKG, 12 LEAD, SUBSEQUENT    Collection Time: 11/14/22  7:24 PM   Result Value Ref Range    Ventricular Rate 56 BPM    QRS Duration 86 ms    Q-T Interval 410 ms    QTC Calculation (Bezet) 395 ms    Calculated R Axis -27 degrees    Calculated T Axis -175 degrees    Diagnosis       Junctional rhythm with premature supraventricular complexes in a pattern of   bigeminy  Low voltage QRS  Nonspecific ST and T wave abnormality  Abnormal ECG  When compared with ECG of 14-NOV-2022 13:40,  premature supraventricular complexes are now present     POC LACTIC ACID    Collection Time: 11/14/22 7:55 PM   Result Value Ref Range    Lactic Acid (POC) 2.04 (HH) 0.40 - 2.00 mmol/L   CULTURE, BLOOD    Collection Time: 11/14/22  8:11 PM    Specimen: Blood   Result Value Ref Range    Special Requests: NO SPECIAL REQUESTS      Culture result: NO GROWTH AFTER 8 HOURS     METABOLIC PANEL, BASIC    Collection Time: 11/14/22  8:26 PM   Result Value Ref Range    Sodium 137 136 - 145 mmol/L    Potassium 5.2 3.5 - 5.5 mmol/L    Chloride 102 100 - 111 mmol/L    CO2 27 21 - 32 mmol/L    Anion gap 8 3.0 - 18 mmol/L    Glucose 109 (H) 74 - 99 mg/dL    BUN 17 7.0 - 18 MG/DL    Creatinine 5.79 (H) 0.6 - 1.3 MG/DL    BUN/Creatinine ratio 3 (L) 12 - 20      eGFR 7 (L) >60 ml/min/1.73m2    Calcium 8.5 8.5 - 10.1 MG/DL   CULTURE, BLOOD    Collection Time: 11/14/22  8:26 PM    Specimen: Blood   Result Value Ref Range    Special Requests: NO SPECIAL REQUESTS      Culture result: NO GROWTH AFTER 8 HOURS     POC LACTIC ACID    Collection Time: 11/14/22 10:46 PM   Result Value Ref Range    Lactic Acid (POC) 1.98 0.40 - 2.00 mmol/L   RESPIRATORY VIRUS PANEL W/COVID-19, PCR    Collection Time: 11/14/22 11:12 PM    Specimen: Nasopharyngeal   Result Value Ref Range    Adenovirus Not detected NOTD      Coronavirus 229E Not detected NOTD      Coronavirus HKU1 Not detected NOTD      Coronavirus CVNL63 Not detected NOTD      Coronavirus OC43 Not detected NOTD      SARS-CoV-2, PCR Not detected NOTD      Metapneumovirus Not detected NOTD      Rhinovirus and Enterovirus Not detected NOTD      Influenza A Not detected NOTD      Influenza A, subtype H1 Not detected NOTD      Influenza A, subtype H3 Not detected NOTD      INFLUENZA A H1N1 PCR Not detected NOTD      Influenza B Not detected NOTD      Parainfluenza 1 Not detected NOTD      Parainfluenza 2 Not detected NOTD      Parainfluenza 3 Not detected NOTD      Parainfluenza virus 4 Not detected NOTD      RSV by PCR Not detected NOTD      B. parapertussis, PCR Not detected NOTD      Bordetella pertussis - PCR Not detected NOTD      Chlamydophila pneumoniae DNA, QL, PCR Not detected NOTD      Mycoplasma pneumoniae DNA, QL, PCR Not detected NOTD     METABOLIC PANEL, BASIC    Collection Time: 11/15/22  4:00 AM   Result Value Ref Range    Sodium 136 136 - 145 mmol/L    Potassium 3.6 3.5 - 5.5 mmol/L    Chloride 101 100 - 111 mmol/L    CO2 26 21 - 32 mmol/L    Anion gap 9 3.0 - 18 mmol/L    Glucose 170 (H) 74 - 99 mg/dL    BUN 20 (H) 7.0 - 18 MG/DL    Creatinine 6.22 (H) 0.6 - 1.3 MG/DL    BUN/Creatinine ratio 3 (L) 12 - 20      eGFR 6 (L) >60 ml/min/1.73m2    Calcium 8.4 (L) 8.5 - 10.1 MG/DL   MAGNESIUM    Collection Time: 11/15/22  4:00 AM   Result Value Ref Range    Magnesium 1.8 1.6 - 2.6 mg/dL   PHOSPHORUS    Collection Time: 11/15/22  4:00 AM   Result Value Ref Range    Phosphorus 4.0 2.5 - 4.9 MG/DL   CBC WITH AUTOMATED DIFF    Collection Time: 11/15/22  4:00 AM   Result Value Ref Range    WBC 16.4 (H) 4.6 - 13.2 K/uL    RBC 2.84 (L) 4.20 - 5.30 M/uL    HGB 9.5 (L) 12.0 - 16.0 g/dL    HCT 29.3 (L) 35.0 - 45.0 %    .2 (H) 78.0 - 100.0 FL    MCH 33.5 24.0 - 34.0 PG    MCHC 32.4 31.0 - 37.0 g/dL    RDW 14.7 (H) 11.6 - 14.5 %    PLATELET 748 430 - 074 K/uL    MPV 10.4 9.2 - 11.8 FL    NRBC 0.5 (H) 0  WBC    ABSOLUTE NRBC 0.08 (H) 0.00 - 0.01 K/uL    NEUTROPHILS 65 40 - 73 %    LYMPHOCYTES 21 21 - 52 %    MONOCYTES 10 3 - 10 %    EOSINOPHILS 1 0 - 5 %    BASOPHILS 1 0 - 2 %    IMMATURE GRANULOCYTES 3 (H) 0.0 - 0.5 %    ABS. NEUTROPHILS 10.7 (H) 1.8 - 8.0 K/UL    ABS. LYMPHOCYTES 3.4 0.9 - 3.6 K/UL    ABS. MONOCYTES 1.6 (H) 0.05 - 1.2 K/UL    ABS. EOSINOPHILS 0.1 0.0 - 0.4 K/UL    ABS. BASOPHILS 0.2 (H) 0.0 - 0.1 K/UL    ABS. IMM.  GRANS. 0.5 (H) 0.00 - 0.04 K/UL    DF AUTOMATED     GLUCOSE, POC    Collection Time: 11/15/22  5:15 AM   Result Value Ref Range    Glucose (POC) 154 (H) 70 - 110 mg/dL   GLUCOSE, POC    Collection Time: 11/15/22  7:54 AM   Result Value Ref Range    Glucose (POC) 172 (H) 70 - 110 mg/dL             Total time to take care of this patient was 55 minutes and more than 50% of time was spent counseling and coordinating care. Disclaimer: Sections of this note are dictated using utilizing voice recognition software, which may have resulted in some phonetic based errors in grammar and contents. Even though attempts were made to correct all the mistakes, some may have been missed, and remained in the body of the document. If questions arise, please contact our department.

## 2022-11-15 NOTE — H&P
Formerly [facility] [address]  Admission History and Physical      Patient:    Nunzio Aschoff      76 y.o. female            MRN:       [id number]                                                                                    Admission Date:         11/14/2022  Code status:                DNR    Nunzio Aschoff is a 76y.o. year old female admitted for Hypotension [I95.9]  Bradycardia [R00.1]. ASSESSMENT AND PLAN  Problem List Items Addressed This Visit          Other    Presence of Watchman left atrial appendage closure device     Other Visit Diagnoses       Symptomatic bradycardia    -  Primary    ESRD (end stage renal disease) on dialysis ([facility] [address]              Nunzio Aschoff is a 76 y.o.  female with PMHx of chronic hypotension, ESRD on HD, R hydronephrosis w/ stent, a-fib, T2DM w/ neuropathy (controlled), hypothyroidism, and macrocytic anemia who is currently admitted to ICU for management of chronic hypotension and symptomatic bradycardia requiring vasopressor therapy. UA strongly suspicious for UTI, likely urosepsis. Acute on Chronic Hypotension  - Presented to ED w/ CH, SOB, nausea, and vomiting in setting of profound hypotension. Initial cardiac workup not concerning for ACS. - Received central ine and stabilized on pressors (dopamine, levophed, IVF) and transferred to ICU for BP management. - Likely dysautonomic d/t absent reflex tachycardia. Hypotension likely multifactorial in the setting of antihypertensive medications, bradycardia, underlying sick sinus syndrome, chronic nausea and vomiting w/ diarrhea, and baseline fragility w/ poor functional status. - Unfortunately, pt has been experiencing chronic hypotension for an extended period of time. Prescribed midodrine outpatient, but does not like to take d/t side effects. Poor candidate for florinef given ESRD.   - Continued to require vasporessors (dopamine and levophed) d/t persistent hypotension; SBP improved to 100s and 90s, but DBP in 20s-50s overall  Plan:  - Continue dopamine and norepinephrine drips; titrate vasopressors w/ goal of SBP >90  - Hold antihypertensives/antiarrhythmics   - Holding midodrine  - Encourage hydration    Hx A-Fib, Now with Symptomatic Bradycardia  - Presented w/ CP and dyspnea  - Presence of implanted watchman device, on plavix and aspirin (watchman in place as of august 2022)  - Cardiology following; hx syncope, weakness; suspect underlying sick sinus syndrome. Not a candidate for intervention.  - TSH wnl. Digoxin level 1.6. Goal to \"wash\" all of digoxin out of system  Plan:  - Pt and cardiology potentially considering pacemaker  - Continue dopamine, cardiology following  - Continuous cardiac monitoring  - Hold antihypertensives/antiarrhythmics   - 81 mg ASA and plavix 75 mg daily for watchman device    Urosepsis  - Initial presentation not particularly convincing for sepsis, as no known source of infection or illness. However, recent UA returned and was highly suggestive of UTI (+for bacteria, leuk est, nitrites, WBC). Urine culture pending.  - Blood cultures collected 11/14:  1 of 2 samples grew gram+ cocci in clusters. Likely contaminated sample. - CXR not significant for acute cardiopulm process  - CT chest/abd/pelvis negative for acute pathology  - Lipase wnl  - WBC decreasing; 8.9 > 16.4 > 11.7 w/ abs neutrophils 7.8, immature granulocytes decreasing to 0.2. Procalcitonin 0.32  - RSV, respiratory panel, and flu A negative  - LA normalized, 2.04 > 1.98. No longer trending.   Plan:  - Continue NS @ 50 cc/hr  - Continue cefepime and vancomycin, to be dosed by pharmacy  - IV famotidine 20 mg daily to prevent stress ulcer  - Daily CBC, monitor for fever  - Following blood & urine cultures    ESRD on HD, Hx hydronephrosis  - Typically MWF HD, last dialyzed earlier on 11/14 for about 3 hrs but felt weak and stopped, after which line fell out of her arm.  - Chronic macrocytic anemia, hbg down to 9.5 today 11/15.  - Renal fxn continuing to worsen; Cr 7.47 today, GFR 5  - Pt has decided to cease dialysis treatments in pursuit of comfort care and limited interventions. If she changes her mind, may need dialysis cath and to switch to CRRT. Plan:  - Nephrology is following. Appreciate their recommendations as pt transitions away from dialysis. - Monitor I/O   - Continue Retacrit Tues, Thurs, Sat  - Continue folate and thiamine  - Daily BMP, magnesium, and phos    Z2OO complicated by diabetic neuropathy and gastroparesis  - Stable; last A1c 4.9. Glucose 152 overnight. Plan:  - Correctional insulin, POC glucose checks q6hrs  - Cymbalta for neuropathy   - Morphine IR 7.5 mg PO q6hr  - PRN tylenol for pain  - PRN Zofran for nausea 2/2 gastroparesis   - A1c ordered    Debility and Functional Weakness  - Pt w/ chronic diseases, quite debilitating and distressing to pt, including autonomic neuropathy, syncopal episodes, ESRD, chronic hypotension, and chronic nausea and vomiting. Feels weak and tired. - Met w/ hospice and palliative care today, 11/15, and discussed goals of care. Has not necessarily agreed to hospice care yet, but desires limited interventions. Plan:  - Palliative care to continue to follow   - Prioritize comfort    Hospital Delirium vs. Anxiety & Depression  - Experiencing some situational depression and dysphoria d/t her declining health, working with palliative care on goals of care. - Continued hallucinations and confusion overnight.  - Required ativan and morphine, responded best to ativan. Plan:  - Continue cymbalta 30 mg daily     Code: DNR  Diet: Adult regular  DVT/AC: SQH 5000 units Q8hr  Mobility: per protocol  Disposition: Critical care, ICU    Point of Contact (relationship): [employer], Son: [phone]    SUBJECTIVE:    OVERNIGHT EVENTS:      Reportedly had hallucinations and continued confusion overnight with persistent nausea and vomiting, both of which improved with ativan (versus morphine).  Continued to require vasporessors (dopamine and levophed) d/t persistent hypotension; SBP improved to 100s and 90s, but DBP in 20s-40s overall    Pt visited at bedside and reports nausea and constipation. She is lightheaded when she stands, and prefers to stay laying down. States her heart pounds w/ each beat. She is at a crossroads deciding whether to peruse dialysis anymore or to proceed with more comfort measures. She wishes to talk to her son more about this. She feels defeated and exhausted, and she is embarrassed about her behavior overnight. No additional concerns or complaints. ED HISTORY:     Lisa Gordon is a 76 y.o.  female with PMHx of chronic hypotension, frailty, ESRD on HD, R hydronephrosis w/ stent, a-fib, T2DM w/ neuropathy (controlled), hypothyroidism, and macrocytic anemia who presented to [facility] ED on 11/14/2022 with complaint of chest pain, weakness, and shortness of breath. Per ED, \"patient was visibly anxious and uncomfortable. History obtained from patient, chart, PFM, and son. Patient reported 3 days of diarrhea. Son reports poor intake for months with associated n/v. Son reported worsening decline since starting digoxin. PFM states her HR normally is a lot higher and BP in 79'V-465W systolic in the office. Patient is currently complaining of leg cramps/ pain and restlessness. Patient c/o of weakness and inability to stand to complete ADLs while at home. Also complaining of presyncope/lightheaded for a few weeks. She is no longer c/o CP or SOB but had this off and on while in the ED per ED notes. Patient states she is overall very uncomfortable. She expressed she is very frustrated with having to return to the hospital many times and has been offered hospice in the past. She states she is not ready for this because she is [place] coward\" but she also is exhausted from all the treatment and discomfort. \"      ED Course:  -Bradycardic, hypotensive (50s-60s/ 30s-50s), afebrile, saturating well on RA  -Labs (pertinent):   CBC (macrocytic anemia, hbg 10.4), BMP (glucose 147, Cr 5.42, GFR 8), TSH wnl, INR 1.0, LA 1.19 (increased to 2.04), troponin wnl  -Imaging:    CXR - no acute cardiopulm process   CTA Abd/pelvis/chest - no acute pathologies  -EKG:   bradycardic, Junctional rhythm with premature supraventricular complexes in a pattern of bigeminy  Repeat EKG revealed bradycardic a-fib  -Meds:   Midodrine x 2, morphine, levophed, cefepime, vancomycin, atropine, lorazepam, zofran  -IVF:   250 mL NS bolus x 3  -Procedures:   Central venous line inserted  -Consults:   Cardiology, Nephrology, Critical Care      While pt did initially improve, her lactate began to uptrend from 1.19 > 2.4 and BP returned to 60s/40s. Pt was given midodrine x 2 as well as fluid boluses, as above. Required central line placement and vasopressor therapy. Transferred to ICU for BP management. Did non-adherence with patient's outpatient treatment plan contribute to this admission? Unknown  If yes, please explain -- pt frail w/ multifactorial hypotension. PMHx, PSHx, SHx, FHx, MEDS, ALLERGIES:   Past Medical History:   Diagnosis Date    Anemia in end-stage renal disease ([facility] [address]     Anticoagulated by anticoagulation treatment     On Apixaban    Chronic hypotension     On Midodrine    Chronic kidney disease     ESRD on HD MWF    Chronic pain     Closed fracture of left inferior pubic ramus, with routine healing, subsequent encounter 11/12/2021    Closed fracture of superior pubic ramus, left, with routine healing, subsequent encounter 11/12/2021    Closed nondisplaced fracture of anterior wall of left acetabulum with routine healing 11/12/2021    Depression     End-stage renal disease on hemodialysis ([facility] [address]     HD at [address] [facility] on MWF.  Tel # [phone]    Gastroesophageal reflux disease     Glaucoma     History of acute pyelonephritis 02/20/2020    History of hydronephrosis 10/05/2021    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    Urine culture (collected 10/8/2021, resulted 10/14/2021) yielded growth of >100,000 colonies/ml of Enterococcus faecalis RESISTANT to Ciprofloxacin, Levofloxacin, Tetracycline and Vancomycin    History of kidney stones     History of recurrent urinary tract infection     History of sepsis 06/18/2021    History of septic shock 10/08/2021    History of urethral stricture     History of urinary tract infection 10/08/2021    Urine culture (collected 10/8/2021, resulted 10/14/2021) yielded growth of >100,000 colonies/ml of Enterococcus faecalis RESISTANT to Ciprofloxacin, Levofloxacin, Tetracycline and Vancomycin    Hyperlipidemia     Hyperphosphatemia 11/14/2021    Hypothyroidism     Lung mass     Mononeuropathy     Involving ring finger of left hand    Need for prophylactic isolation 10/08/2021    Urine culture (collected 10/8/2021, resulted 10/14/2021) yielded growth of >100,000 colonies/ml of Enterococcus faecalis RESISTANT to Ciprofloxacin, Levofloxacin, Tetracycline and Vancomycin    Osteoporosis     Paroxysmal atrial fibrillation (HCC)     Secondary hyperparathyroidism of renal origin ([facility] [address]     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 ([facility] [address]     HbA1c (10/8/2021) = 4.6    Uric acid nephrolithiasis     Urinary incontinence       Past Surgical History:   Procedure Laterality Date    HX APPENDECTOMY      HX CHOLECYSTECTOMY      HX GASTRIC BYPASS      Gastric stapling    HX KNEE ARTHROSCOPY      HX UROLOGICAL      right PCN placement    HX UROLOGICAL  07/23/2018    RIGHT URETEROSCOPY WITH HOLMIUM LASER    IR EXCHANGE NEPHRO PERC LT SI  2/21/2020    IR EXCHANGE NEPHRO PERC RT SI  4/13/2020    IR EXCHANGE NEPHRO PERC RT SI  7/17/2020    IR NEPHROSTOMY PERC RT PLC CATH  SI  10/14/2020    IR NEPHROURETERAL PERC RT PLC CATH NEW ACCESS  SI  4/30/2020    PA INTRO CATH DIALYSIS CIRCUIT DX ANGRPH FLUOR S&I Left 9/24/2020    FISTULOGRAM LEFT/poss permanent catheter placement performed by Gautam Hansen MD at [facility] CATH LAB    VASCULAR SURGERY PROCEDURE UNLIST      lef AVF      Social History     Tobacco Use    Smoking status: Never    Smokeless tobacco: Never   Vaping Use    Vaping Use: Never used   Substance Use Topics    Alcohol use: Never    Drug use: Never     Family History   Problem Relation Age of Onset    Heart Surgery Sister      Allergies   Allergen Reactions    Albumin, Human 25 % Itching     Headache - severe migraine like, itchy eyes, runny nose    Ciprofloxacin Hives    Cyclopentolate Unknown (comments)    Iron Sucrose Diarrhea    Statins-Hmg-Coa Reductase Inhibitors Other (comments)     Body ache       Current Facility-Administered Medications   Medication Dose Route Frequency Provider Last Rate Last Admin    DOPamine (INTROPIN) 800 mg in dextrose 5% 500 mL infusion  5-20 mcg/kg/min IntraVENous TITRATE Ashlyn Zhang PA-C 15.9 mL/hr at 11/15/22 0302 5 mcg/kg/min at 11/15/22 0302    epoetin caitlin-epbx (RETACRIT) injection 8,000 Units  8,000 Units SubCUTAneous Q TUE, THU & SAT Valencia Bradley MD        insulin lispro (HUMALOG) injection   SubCUTAneous AC&HS Kenroy Bullock MD   2 Units at 11/15/22 1236    glucose chewable tablet 16 g  4 Tablet Oral PRN Kenroy Bullock MD        glucagon (GLUCAGEN) injection 1 mg  1 mg IntraMUSCular PRN Kenroy Bullock MD        dextrose 10% infusion 0-250 mL  0-250 mL IntraVENous PRN Kenroy Bullock MD        0.9% sodium chloride infusion  50 mL/hr IntraVENous CONTINUOUS Valencia Bradley MD 50 mL/hr at 11/15/22 1236 50 mL/hr at 11/15/22 1236    [START ON 11/16/2022] aspirin chewable tablet 81 mg  81 mg Oral DAILY Susan AVENDANO PA-C        [START ON 11/16/2022] clopidogreL (PLAVIX) tablet 75 mg  75 mg Oral DAILY Silvia Meyer PA-C        [Held by provider] midodrine (PROAMATINE) tablet 10 mg  10 mg Oral TID WITH MEALS Violeta Murray MD   10 mg at 11/15/22 0844    Vancomycin: Pharmacy to dose   Other Rx Dosing/Monitoring Violeta Murray MD        cefepime (MAXIPIME) 1 g in 0.9% sodium chloride (MBP/ADV) 50 mL MBP  1 g IntraVENous Q24H Violeta Murray MD        NOREPINephrine (LEVOPHED) 8 mg in 5% dextrose 250mL (32 mcg/mL) infusion  0.5-50 mcg/min IntraVENous TITRATE Ashlyn Dawkins PA-C 26.3 mL/hr at 11/15/22 1107 14 mcg/min at 11/15/22 1107    sodium chloride (NS) flush 5-40 mL  5-40 mL IntraVENous Q8H SABA'Ashlyn Stoll PA-C   10 mL at 11/15/22 0630    sodium chloride (NS) flush 5-40 mL  5-40 mL IntraVENous PRN Ashlyn Dawkins PA-C        acetaminophen (TYLENOL) tablet 650 mg  650 mg Oral Q6H PRN Ashlyn Dawkins PA-C   650 mg at 11/15/22 0341    Or    acetaminophen (TYLENOL) suppository 650 mg  650 mg Rectal Q6H PRN Ashlyn Petit PA-C        polyethylene glycol (MIRALAX) packet 17 g  17 g Oral DAILY PRN Ashlyn Dawkins PA-C        ondansetron (ZOFRAN ODT) tablet 4 mg  4 mg Oral Q8H PRN Ashlyn Petit PA-C        Or    ondansetron (ZOFRAN) injection 4 mg  4 mg IntraVENous Q6H PRN Ashlyn Dawkins PA-C   4 mg at 11/15/22 0844    heparin (porcine) injection 5,000 Units  5,000 Units SubCUTAneous Q8H SABA'Ashlyn Stoll PA-C   5,000 Units at 11/15/22 5770    morphine IR (MS IR) tablet 7.5 mg  7.5 mg Oral Q6H PRN Ashlyn Dawkins PA-C   7.5 mg at 11/14/22 2312    DULoxetine (CYMBALTA) capsule 20 mg  20 mg Oral DAILY SABA'Ashlyn Stoll PA-C   20 mg at 11/15/22 0844    famotidine (PF) (PEPCID) 20 mg in 0.9% sodium chloride 10 mL injection  20 mg IntraVENous Q24H O'Jerman, Ashlyn E, PA-C   20 mg at 11/15/22 0844    thiamine (B-1) 200 mg in 0.9% sodium chloride 50 mL IVPB  200 mg IntraVENous DAILY Ashlyn Petit PA-C 200 mL/hr at 11/15/22 0056 200 mg at 65/02/17 8472    folic acid (FOLVITE) tablet 1 mg  1 mg Oral DAILY Ashlyn Petit PA-C   1 mg at 11/14/22 2326        ROS    (positive findings are in BOLD; negative findings are in regular font)  Constitutional: fevers, chills, appetite changes, weight changes, fatigue  HEENT: changes in vision, changes in hearing, sore throat, dysphagia  Cardiovascular: chest pain, palpitations, PND, orthopnea, edema  Pulmonary: SOB, cough, sputum production, wheezing, chest tightness  Gastrointestinal: abdominal pain, nausea/vomiting, diarrhea, constipation, melena, hematochezia  Genitourinary: dysuria, hesitation, dribbling, urgency, hematuria  Musculoskeletal: arthralgias, myalgias  Skin: rash, itching  Neurological: sensory changes, motor changes, headache  Psychiatric: mood changes  Endocrine: heat/cold intolerance  Heme: easy bruising/easy bleeding, LAD     OBJECTIVE:  Patient Vitals for the past 24 hrs:   BP Temp Pulse Resp SpO2 Height Weight   11/15/22 1200 -- 97.4 °F (36.3 °C) -- -- -- -- --   11/15/22 1045 -- -- 60 20 97 % -- --   11/15/22 1030 -- -- 63 16 100 % -- --   11/15/22 1015 -- -- 61 16 100 % -- --   11/15/22 1000 (!) 115/54 -- 64 13 99 % -- --   11/15/22 0945 -- -- (!) 55 15 98 % -- --   11/15/22 0930 -- -- (!) 53 17 97 % -- --   11/15/22 0915 (!) 116/57 -- (!) 56 19 98 % -- --   11/15/22 0900 127/84 -- (!) 53 15 98 % -- --   11/15/22 0845 (!) 118/52 -- (!) 53 17 100 % -- --   11/15/22 0830 (!) 117/54 -- (!) 56 20 98 % -- --   11/15/22 0815 -- -- 61 16 100 % -- --   11/15/22 0800 (!) 125/110 98.4 °F (36.9 °C) 66 16 100 % -- --   11/15/22 0745 102/74 -- 63 16 98 % -- --   11/15/22 0730 (!) 117/50 -- 60 16 100 % -- --   11/15/22 0715 114/77 -- 63 14 100 % -- --   11/15/22 0700 (!) 81/69 -- 63 11 100 % -- --   11/15/22 0600 (!) 119/52 -- (!) 58 18 96 % -- --   11/15/22 0500 92/70 -- 69 17 97 % -- --   11/15/22 0400 (!) 89/56 -- 65 17 98 % -- --   11/15/22 0302 (!) 115/93 -- (!) 47 14 100 % -- --   11/15/22 0200 (!) 76/61 98.3 °F (36.8 °C) (!) 50 14 100 % -- --   11/15/22 0109 (!) 121/101 -- (!) 59 -- 98 % -- --   11/14/22 2338 121/88 -- (!) 55 22 97 % -- --   11/14/22 2335 -- -- -- -- 97 % -- --   11/14/22 2330 (!) 92/51 -- (!) 58 22 97 % -- --   11/14/22 2254 (!) 147/90 -- (!) 56 19 100 % -- --   11/14/22 2159 (!) 82/47 -- (!) 56 22 100 % -- --   11/14/22 1924 106/82 -- (!) 58 22 100 % -- --   11/14/22 1907 -- -- (!) 59 15 91 % -- --   11/14/22 1852 (!) 69/33 -- (!) 57 10 100 % -- --   11/14/22 1837 (!) 80/56 -- (!) 56 17 100 % -- --   11/14/22 1822 -- -- (!) 54 14 100 % -- --   11/14/22 1807 (!) 64/46 -- 61 18 100 % -- --   11/14/22 1752 105/62 -- (!) 56 14 100 % -- --   11/14/22 1745 (!) 71/54 -- (!) 57 -- -- -- --   11/14/22 1737 (!) 118/44 -- (!) 59 17 98 % -- --   11/14/22 1726 (!) 87/39 -- (!) 44 -- -- -- --   11/14/22 1722 (!) 71/54 -- (!) 52 16 100 % -- --   11/14/22 1707 (!) 87/39 -- (!) 57 14 99 % -- --   11/14/22 1652 (!) 87/44 -- (!) 44 15 100 % -- --   11/14/22 1637 (!) 84/43 -- (!) 46 16 100 % -- --   11/14/22 1622 (!) 107/42 -- (!) 44 18 100 % -- --   11/14/22 1607 (!) 114/53 -- (!) 45 14 -- -- --   11/14/22 1507 -- -- -- -- -- 5' 2\" (1.575 m) 85 kg (187 lb 6.3 oz)   11/14/22 1506 (!) 86/37 -- (!) 47 17 97 % -- --   11/14/22 1451 (!) 87/31 -- (!) 36 14 100 % -- --   11/14/22 1436 90/64 -- (!) 43 16 -- -- --     Body mass index is 34.27 kg/m². PHYSICAL EXAM:    General: The patient appears comfortable in bed, tired  HEENT: NCAT, EOM intact; oral mucosa well perfused  Neck: supple, no LAD, no tenderness  CVS: bradycardic, regular rhythm, S1, S2 normal, no murmur, click, rub or gallop  Lungs: chest clear, no wheezing, rales, normal symmetric air entry    Abdomen: Soft, non-distended, non-TTP, BS+, no organomegaly, no masses  Ext: No calf tenderness, peripheral pulses present, no significant edema.   Skin: significant ecchymosis over upper extremities, particularly at needle puncture sites  Neuro: No focal neurologic deficits or gross abnormalities  Psych: A&O, slightly dysphoric mood     RECENT LABS AND IMAGING ON ADMISSION   Recent Results (from the past 24 hour(s))   TROPONIN-HIGH SENSITIVITY    Collection Time: 11/14/22  3:45 PM   Result Value Ref Range Troponin-High Sensitivity 14 0 - 54 ng/L   PROCALCITONIN    Collection Time: 11/14/22  3:45 PM   Result Value Ref Range    Procalcitonin 0.30 ng/mL   POC LACTIC ACID    Collection Time: 11/14/22  5:24 PM   Result Value Ref Range    Lactic Acid (POC) 1.19 0.40 - 2.00 mmol/L   TROPONIN-HIGH SENSITIVITY    Collection Time: 11/14/22  7:21 PM   Result Value Ref Range    Troponin-High Sensitivity 15 0 - 54 ng/L   EKG, 12 LEAD, SUBSEQUENT    Collection Time: 11/14/22  7:24 PM   Result Value Ref Range    Ventricular Rate 56 BPM    QRS Duration 86 ms    Q-T Interval 410 ms    QTC Calculation (Bezet) 395 ms    Calculated R Axis -27 degrees    Calculated T Axis -175 degrees    Diagnosis       Junctional rhythm with premature supraventricular complexes in a pattern of   bigeminy  Low voltage QRS  Nonspecific ST and T wave abnormality  Abnormal ECG  When compared with ECG of 14-NOV-2022 13:40,  premature supraventricular complexes are now present     POC LACTIC ACID    Collection Time: 11/14/22  7:55 PM   Result Value Ref Range    Lactic Acid (POC) 2.04 (HH) 0.40 - 2.00 mmol/L   CULTURE, BLOOD    Collection Time: 11/14/22  8:11 PM    Specimen: Blood   Result Value Ref Range    Special Requests: NO SPECIAL REQUESTS      Culture result: NO GROWTH AFTER 8 HOURS     METABOLIC PANEL, BASIC    Collection Time: 11/14/22  8:26 PM   Result Value Ref Range    Sodium 137 136 - 145 mmol/L    Potassium 5.2 3.5 - 5.5 mmol/L    Chloride 102 100 - 111 mmol/L    CO2 27 21 - 32 mmol/L    Anion gap 8 3.0 - 18 mmol/L    Glucose 109 (H) 74 - 99 mg/dL    BUN 17 7.0 - 18 MG/DL    Creatinine 5.79 (H) 0.6 - 1.3 MG/DL    BUN/Creatinine ratio 3 (L) 12 - 20      eGFR 7 (L) >60 ml/min/1.73m2    Calcium 8.5 8.5 - 10.1 MG/DL   CULTURE, BLOOD    Collection Time: 11/14/22  8:26 PM    Specimen: Blood   Result Value Ref Range    Special Requests: NO SPECIAL REQUESTS      Culture result: NO GROWTH AFTER 8 HOURS     POC LACTIC ACID    Collection Time: 11/14/22 10:46 PM   Result Value Ref Range    Lactic Acid (POC) 1.98 0.40 - 2.00 mmol/L   RESPIRATORY VIRUS PANEL W/COVID-19, PCR    Collection Time: 11/14/22 11:12 PM    Specimen: Nasopharyngeal   Result Value Ref Range    Adenovirus Not detected NOTD      Coronavirus 229E Not detected NOTD      Coronavirus HKU1 Not detected NOTD      Coronavirus CVNL63 Not detected NOTD      Coronavirus OC43 Not detected NOTD      SARS-CoV-2, PCR Not detected NOTD      Metapneumovirus Not detected NOTD      Rhinovirus and Enterovirus Not detected NOTD      Influenza A Not detected NOTD      Influenza A, subtype H1 Not detected NOTD      Influenza A, subtype H3 Not detected NOTD      INFLUENZA A H1N1 PCR Not detected NOTD      Influenza B Not detected NOTD      Parainfluenza 1 Not detected NOTD      Parainfluenza 2 Not detected NOTD      Parainfluenza 3 Not detected NOTD      Parainfluenza virus 4 Not detected NOTD      RSV by PCR Not detected NOTD      B. parapertussis, PCR Not detected NOTD      Bordetella pertussis - PCR Not detected NOTD      Chlamydophila pneumoniae DNA, QL, PCR Not detected NOTD      Mycoplasma pneumoniae DNA, QL, PCR Not detected NOTD     METABOLIC PANEL, BASIC    Collection Time: 11/15/22  4:00 AM   Result Value Ref Range    Sodium 136 136 - 145 mmol/L    Potassium 3.6 3.5 - 5.5 mmol/L    Chloride 101 100 - 111 mmol/L    CO2 26 21 - 32 mmol/L    Anion gap 9 3.0 - 18 mmol/L    Glucose 170 (H) 74 - 99 mg/dL    BUN 20 (H) 7.0 - 18 MG/DL    Creatinine 6.22 (H) 0.6 - 1.3 MG/DL    BUN/Creatinine ratio 3 (L) 12 - 20      eGFR 6 (L) >60 ml/min/1.73m2    Calcium 8.4 (L) 8.5 - 10.1 MG/DL   MAGNESIUM    Collection Time: 11/15/22  4:00 AM   Result Value Ref Range    Magnesium 1.8 1.6 - 2.6 mg/dL   PHOSPHORUS    Collection Time: 11/15/22  4:00 AM   Result Value Ref Range    Phosphorus 4.0 2.5 - 4.9 MG/DL   CBC WITH AUTOMATED DIFF    Collection Time: 11/15/22  4:00 AM   Result Value Ref Range    WBC 16.4 (H) 4.6 - 13.2 K/uL RBC 2.84 (L) 4.20 - 5.30 M/uL    HGB 9.5 (L) 12.0 - 16.0 g/dL    HCT 29.3 (L) 35.0 - 45.0 %    .2 (H) 78.0 - 100.0 FL    MCH 33.5 24.0 - 34.0 PG    MCHC 32.4 31.0 - 37.0 g/dL    RDW 14.7 (H) 11.6 - 14.5 %    PLATELET 424 226 - 424 K/uL    MPV 10.4 9.2 - 11.8 FL    NRBC 0.5 (H) 0  WBC    ABSOLUTE NRBC 0.08 (H) 0.00 - 0.01 K/uL    NEUTROPHILS 65 40 - 73 %    LYMPHOCYTES 21 21 - 52 %    MONOCYTES 10 3 - 10 %    EOSINOPHILS 1 0 - 5 %    BASOPHILS 1 0 - 2 %    IMMATURE GRANULOCYTES 3 (H) 0.0 - 0.5 %    ABS. NEUTROPHILS 10.7 (H) 1.8 - 8.0 K/UL    ABS. LYMPHOCYTES 3.4 0.9 - 3.6 K/UL    ABS. MONOCYTES 1.6 (H) 0.05 - 1.2 K/UL    ABS. EOSINOPHILS 0.1 0.0 - 0.4 K/UL    ABS. BASOPHILS 0.2 (H) 0.0 - 0.1 K/UL    ABS. IMM. GRANS. 0.5 (H) 0.00 - 0.04 K/UL    DF AUTOMATED     NT-PRO BNP    Collection Time: 11/15/22  4:00 AM   Result Value Ref Range    NT pro-BNP 3,393 (H) 0 - 1,800 PG/ML   GLUCOSE, POC    Collection Time: 11/15/22  5:15 AM   Result Value Ref Range    Glucose (POC) 154 (H) 70 - 110 mg/dL   GLUCOSE, POC    Collection Time: 11/15/22  7:54 AM   Result Value Ref Range    Glucose (POC) 172 (H) 70 - 110 mg/dL   GLUCOSE, POC    Collection Time: 11/15/22 12:02 PM   Result Value Ref Range    Glucose (POC) 157 (H) 70 - 110 mg/dL        XR (Most Recent). Results from [place] encounter on 11/14/22    XR CHEST PORT    Narrative  EXAM: XR CHEST PORT    INDICATION: chest pain    COMPARISON: 10/21/2022. FINDINGS: A single view of the chest demonstrates chronic linear scarring  without change. No new lung findings. Stable elevated right hemidiaphragm. .  Stable cardiac silhouette. . No acute bone findings. .    Impression  No acute findings or interval change. CT (Most Recent) Results from Hospital Encounter encounter on 11/14/22    CTA CHEST ABD PELV W WO CONT    Narrative  CT angiogram aorta.  CT chest, abdomen and pelvis without and with IV contrast.    HISTORY: Chest pain and back pain with hypotension. All CT scans at this facility are performed using dose optimization technique as  appropriate to a performed exam, to include automated exposure control,  adjustment of the mA and/or kV according to patient size (including appropriate  matching for site specific examination) or use of iterative reconstruction  technique. Dialysis patient, to get dialysis the next morning. Axial CT images obtained. Sagittal and coronal MIP images of the aorta were  generated. Dedicated 3-D images of the aorta and its branches also generated on  a dedicated workstation. Sagittal and coronal images of abdomen and pelvis also  generated. CT angiogram aorta: Noncontrast study shows no intramural hematoma. Contrast  study shows normal caliber throughout. No aortic dissection. Moderate  atherosclerotic calcification present. No focal aneurysm. Some degree of  stenosis due to plaques in the celiac and SMA origins. PATRICIO is occluded, also  obscured by motion artifacts. Stenotic renal arteries. Iliac arteries are within  normal caliber. Minimal ectasia of the bifurcation. CT CHEST: No pulmonary infiltrate or consolidation. No pleural effusion or  pneumothorax. No mediastinal or hilar mass. No cardiomegaly or pericardial  effusion. Corticated calcification noted. No central pulmonary embolism. CT ABDOMEN/PELVIS: Liver and spleen unremarkable. Cholecystectomy. Small hiatal  hernia. Gastric surgery. Pancreas grossly unremarkable. No adrenal mass. Atrophic kidneys with cystic lesions. Double J ureteral stent on the right. No  surrounding inflammation. Small bowel and colon not distended. No extraluminal inflammation. Suspect  scattered colonic diverticulosis. No ascites or free air. Mild subcutaneous edema throughout. Small amount of excreted contrast material  in the decompressed bladder. Uterus unremarkable. No pelvic mass. Degenerative disease in the thoracolumbar spine.  Old left inferior pubic ramal  fracture. Impression  1. No aortic aneurysm or dissection. Moderately advanced atherosclerotic  disease. 2. No acute abnormalities in the chest, abdomen or pelvis. ECHO No results found for this or any previous visit. EKG No results found for this or any previous visit. I have discussed MsLen Marlene Urena Bees case with my attending who agrees with the plan of care.     Bibi Tidwell DO , PGY-1   [facility]   November 15, 2022, 1:52 PM

## 2022-11-15 NOTE — PALLIATIVE CARE
[address] [facility] [phone]     Dr. Jessica Agarwal MD with Palliative Medicine, Kari Aguilar RN and this LMSW met with pt at bedside to assess. Upon entry, pt laying in bed with head elevated. Pt AAOX3. Pt reports she is tired of coming back and forth to hospital and wishes she'd never started dialysis 2 years ago. She reports she has no quality of life, because all she ever does is go to dialysis and medical appointments, when she isn't hospitalized, and is ready to stop all the toney back and forth, to focus on quality of life. MD informed her, she has the option of continuing current level of treatment. she could be Dc'd with hospice, could continue her maintenance medications, but change focus of treatment to comfort treating her symptoms of pain, anxiety and/or shortness of breath, with medications, and go home or to a facility for this care. He informed her, she would have to stop for dialysis for hospice. She reports she has medicare and has applied for medicaid. She is not sure of the status of her medicaid application. She reports her son and his wife, with whom she resides at this time, would not be able to provide care for her 24/7, as they both work outside the home. She reports she may be interested in hospice in a SNF setting, if qualifies for medicaid. [facility] [place] her will ask CM to follow up on medicaid application to see where it is in process. Pt verbalized understanding. Pt was informed we will give her time to speak with her son and will follow up in a.m. Pt agreeable to doing this. No further needs or concerns verbalized at this time. [facility] sent Perfect Serve to Parul Pace CM, requesting he speak with pt to follow up on her medicaid application status. Dr. Jessica Agarwal MD made telephone contact with pt's son, Javier Lipcami, to inform him of pt's decision and what this potentially means.   Brayan Patterson reports pt vacillates between being tired of treatment and wanting to stop and wanting to continue treatment. MD reviewed discussion palliative team had with pt and reasoning she provided and that she was able to state consequences of stopping dialysis, should she decide to do so. MD informed [facility] putting in consult for Hospice for INFORMATION ONLY. [facility] verbalized understanding and request that pt not sign any documents without him and his wife present. He was reassured pt is not signing anything at this time. [facility] reports he and his wife will visit with pt later today. No other needs or concerns identified at this time. Thank you for this referral to Palliative Care. The palliative care team remains available to provide support for patient and her family. Pt has a POST for DNR / DNI with limited interventions and an AMD already completed.       Don Gaines, [facility] [facility]  Palliative Medicine Inpatient   [facility]. [facility] [place]  Palliative COPE Line: [phone])

## 2022-11-15 NOTE — PROGRESS NOTES
Problem: Falls - Risk of  Goal: *Absence of Falls  Description: Document Shanel Perea Fall Risk and appropriate interventions in the flowsheet.   Outcome: Progressing Towards Goal  Note: Fall Risk Interventions:       Mentation Interventions: Adequate sleep, hydration, pain control, Bed/chair exit alarm, More frequent rounding, Reorient patient    Medication Interventions: Bed/chair exit alarm    Elimination Interventions: Bed/chair exit alarm, Call light in reach              Problem: Patient Education: Go to Patient Education Activity  Goal: Patient/Family Education  Outcome: Progressing Towards Goal     Problem: Pain  Goal: *Control of Pain  Outcome: Progressing Towards Goal     Problem: Patient Education: Go to Patient Education Activity  Goal: Patient/Family Education  Outcome: Progressing Towards Goal     Problem: Hypotension  Goal: *Blood pressure within specified parameters  Outcome: Progressing Towards Goal  Goal: *Fluid volume balance  Outcome: Progressing Towards Goal  Goal: *Labs within defined limits  Outcome: Progressing Towards Goal     Problem: Patient Education: Go to Patient Education Activity  Goal: Patient/Family Education  Outcome: Progressing Towards Goal

## 2022-11-15 NOTE — PROGRESS NOTES
Pharmacy Monitoring/Intervention - Renal Dosing    An ordered medication has been automatically dosed per [employer] Renal Dosing Protocol for [facility], 76 y.o., female:    Provider ordered medication: Famotidine 20 mg q12h  Changed to: Famotidine 20 mg q24h    Pharmacy will follow daily and adjust medication as appropriate for renal function and/or serum levels. Thank you,     ROVERTO Britt  Clinical Pharmacist        Weight Weight: 85 kg (187 lb 6.3 oz)   BMI Estimated body mass index is 34.27 kg/m² as calculated from the following:    Height as of this encounter: 157.5 cm (62\"). Weight as of this encounter: 85 kg (187 lb 6.3 oz). Temperature Temp: 98.2 °F (36.8 °C)  Temp (72hrs), Av.2 °F (36.8 °C), Min:98.2 °F (36.8 °C), Max:98.2 °F (36.8 °C)     Labs Recent Labs     22  1330   CREA 5.79* 5.42*   BUN 17 15   WBC  --  8.9      CrCl Estimated Creatinine Clearance: 8 mL/min (A) (based on SCr of 5.79 mg/dL (H)). Dialysis  Intermittent Hemodialysis (IHD) - 3x week   NOTE: This information is to be used to assist in clinical efficiency but is not a substitute for clinical judgement.

## 2022-11-15 NOTE — ED NOTES
Report called to devika cornejo rn. All questions answered. Pt to go to CT prior to going to the floor.

## 2022-11-15 NOTE — ACP (ADVANCE CARE PLANNING)
Advance Care Planning     General Advance Care Planning (ACP) Conversation    Visited patient along with Palliative team members MEGHANN Asher [facility] and Dr. Kaela Short. Patient resting quietly in bed, eyes closed. Did respond to name. Alert and oriented X 3. Pleasant and talkative with our team.     Patient presented to the ED 11/14/22 with shortness of breath, generalized pain and weakness at dialysis. PMH: ESRD on HD, A-fib, DM, Hypotension. Pt does  have an Advance Directive on file in EMR. The AMD names her son, Ирина Sawant, as her primary medical decision maker, and her daughter in law, Spring Segura, as the secondary decision maker. Patient also has a POST form on file stating DNR/DNI, LIMITED INTERVENTIONS, NO FEEDING TUBE. Both the AMD and POST forms were reviewed with patient. She remembers completing both and continues to agree with her decisions. She does not want to make any changes at this time. Discussed goals of care, patient tells our team that she is \"tired\", wishes she never started dialysis. States, \"If I knew then what I know now, I wouldn't have done it\". Explained her options of continuing current treatment plan or stopping dialysis and transitioning to comfort measures with the support of hospice. Patient voiced understanding and wishes to talk with her son before making any decisions. She is worried about her finances and she is \"going to pay\" for her care. States she has started to apply for Medicaid, will ask CM to check on the status of her Medicaid application. Patient receptive to our information and is appreciative of our visit. Call placed to patient's son Lula Molina per Dr. Osman Sotomayor. Son states Bryson Go goes through this\", Bryson Go keeps changing her mind\". Son explained that he and his wife are coming to see patient later today and will talk with her about goals of care moving forward. He is clear that he does not want her sign \"anything\" until he speaks with her.  Son is agreeable to hospice consult for \"information only\". Primary Decision Maker (Health Care Agent): Ирина Sawant  Relationship to patient: son  Phone number: [phone]  [x] Named in a scanned document   [] Legal Next of Kin  [] Guardian    Secondary Decision Maker ([address]): Spring Segura  Relationship to patient: daughter in law  Phone number: [phone]  [x] Named in a scanned document   [] Legal Next of Kin  [] Guardian    ACP documents you currently have include:  [x] Advance Directive or Living Will  [] Durable Do Not Resuscitate  [x] Physician Orders for Scope of Treatment (POST)  [] Medical Power of   [] Other    Thank you for the Palliative Medicine consult and allowing us to participate in the care of Ms. Rhonda Merritt. Will continue to monitor and provide support.     CODE STATUS: DNR/DNI, LIMITED INTERVENTIONS, NO FEEDING TUBE    Flora Ferrera RN  Palliative Medicine Inpatient RN  [address] 74: [phone])

## 2022-11-15 NOTE — ED NOTES
7:07 PM :Pt care assumed from Dr. Cristhian Robin , ED provider. Pt complaint(s), current treatment plan, progression and available diagnostic results have been discussed thoroughly. The patient was seen and evaluated on my shift.    Rounding occurred: yes  Intended Disposition: TBD  Pending diagnostic reports and/or labs (please list): Medication washout, hypotension, PFM saw her and needs recall cards, nephrology, CTA

## 2022-11-15 NOTE — ED NOTES
Pt agitated, unable to stay still in bed requesting to sit up. Meds given as ordered. Patient ready for CT. This RN called ct, who stated it would be ~30min until they were ready.

## 2022-11-16 NOTE — PROGRESS NOTES
attended the interdisciplinary rounds for [facility], who is a 76 y.o.,female. Patients Primary Language is: [place]. According to the patients EMR Alevism Affiliation is: Gnosticism.      The reason the Patient came to the hospital is:   Patient Active Problem List    Diagnosis Date Noted    ESRD on dialysis ([address] 05/25/2022    Bradycardia 11/14/2022    Hypotension 11/14/2022    Ureteral stricture 11/08/2022    A-fib ([address] 06/27/2022    Presence of Watchman left atrial appendage closure device 06/27/2022    SOB (shortness of breath) on exertion 05/25/2022    Stricture of female urethra 04/12/2022    Gross hematuria 12/02/2021    Hyperkalemia 11/29/2021    Mononeuropathy     Hyperlipidemia     Hypothyroidism     History of kidney stones     Chronic pain     Lung mass     History of urethral stricture     Uric acid nephrolithiasis     Urinary incontinence     Glaucoma     Depression     Multiple closed pelvic fractures without disruption of pelvic ring ([address] 11/19/2021    Impaired mobility and ADLs 11/19/2021    Hyperphosphatemia 11/14/2021    Closed fracture of left inferior pubic ramus, with routine healing, subsequent encounter 11/12/2021    Closed nondisplaced fracture of anterior wall of left acetabulum with routine healing 11/12/2021    Closed fracture of superior pubic ramus, left, with routine healing, subsequent encounter 11/12/2021    Anticoagulated by anticoagulation treatment     Paroxysmal atrial fibrillation ([address]     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    History of septic shock 10/08/2021    History of urinary tract infection 10/08/2021    Need for prophylactic isolation 10/08/2021    History of hydronephrosis 10/05/2021    End-stage renal disease on hemodialysis ([address]     History of sepsis 06/18/2021    History of recurrent urinary tract infection     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 See Adv Care Plan. Plan:  Chaplains will continue to follow and will provide pastoral care on an as needed/requested basis.  recommends bedside caregivers page  on duty if patient shows signs of acute spiritual or emotional distress.     [address] [facility]  Board Certified [address] [place] [address]   [phone]

## 2022-11-16 NOTE — PROGRESS NOTES
[facility]. Pulmonary, Critical Care, and Sleep Medicine    Name: Corwin Plata MRN: [id number]   : 1945 Hospital: [facility]   Date: 2022  Admission Date: 2022     Chart and notes reviewed. Data reviewed. I have evaluated all findings. [x]I have reviewed the flowsheet and previous days notes. [x]The patient is critically ill on      []Mechanical ventilation [x]Pressors   []BiPAP []         Interval HPI:\"Patient is a 76 y.o. female w/ PMH of chronic hypotension, right hydonephrosis with stent, depression, afibb, DM2 with neuropathy, ESRD on HD presenting to [facility] with c/o pain, weakness, and SOB on . Patient is visibly anxious and uncomfortable. History obtained from patient, chart, PFM, and son. Patient reports 3 days of diarrhea PTA. Son reports poor intake for mos with associated n/v. Son reports worsening decline since starting digoxin. PFM states her HR normally is a lot higher and BP in 06'N-582F systolic in the office. Patient is currently complaining of leg cramps/ pain and restlessness. Patient is complaining of weakness and inability to stand to complete ADLs while at home. Also complaining of presyncope/lightheaded for a few weeks. She is no longer c/o CP or SOB but had this off and on while in the ED per ED notes. Patient states she is overall very uncomfortable. She expressed she is very frustrated with having to return to the hospital many times and has been offered hospice in the past. She states she is not ready for this because she is [place] coward\" but she also is exhausted from all the treatment and discomfort. \"    Subjective 22  Hospital Day: 3  N/V cont' overnight refractory to zofran, improved with ativan   Cont' on levo and dopamine   Cont' with confusion overnight                 ROS:Review of systems not obtained due to patient factors. Events and notes from last 24 hours reviewed.      Patient Active Problem List Diagnosis Code    History of acute pyelonephritis Z87.448    History of infection with vancomycin resistant Enterococcus (VRE) Z86.19    Anemia in end-stage renal disease ([facility]) N18.6, D63.1    Chronic hypotension I95.89    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 ([facility]) E11.22, N18.6    Secondary hyperparathyroidism of renal origin ([facility] N25.81    Gastroesophageal reflux disease K21.9    History of recurrent urinary tract infection Z87.440    History of sepsis Z86.19    End-stage renal disease on hemodialysis ([facility]) N18.6, Z99.2    History of hydronephrosis Z87.448    History of septic shock Z86.19    Anticoagulated by anticoagulation treatment Z79.01    Paroxysmal atrial fibrillation ([facility]) I48.0    Closed fracture of left inferior pubic ramus, with routine healing, subsequent encounter S32.592D    Closed nondisplaced fracture of anterior wall of left acetabulum with routine healing S32.415D    Closed fracture of superior pubic ramus, left, with routine healing, subsequent encounter S32.512D    Multiple closed pelvic fractures without disruption of pelvic ring ([facility] S32.82XA    Depression F32. A    History of urinary tract infection Z87.440    Need for prophylactic isolation Z41.8    Hyperlipidemia E78.5    Hypothyroidism E03.9    History of kidney stones Z87.442    Chronic pain G89.29    Lung mass R91.8    History of urethral stricture Z87.448    Uric acid nephrolithiasis N20.0    Urinary incontinence R32    Glaucoma H40.9    Hyperphosphatemia E83.39    Mononeuropathy G58.9    Hyperkalemia E87.5    Gross hematuria R31.0    Impaired mobility and ADLs Z74.09, Z78.9    Stricture of female urethra N35.92    ESRD on dialysis ([facility]) N18.6, Z99.2    SOB (shortness of breath) on exertion R06.02    A-fib ([facility]) I48.91    Presence of Watchman left atrial appendage closure device Z95.818    Ureteral stricture N13.5    Bradycardia R00.1    Hypotension I95.9       Vital Signs:  Visit Vitals  BP (!) 122/54   Pulse 65 Temp 98 °F (36.7 °C)   Resp 16   Ht 5' 2\" (1.575 m)   Wt 85 kg (187 lb 6.3 oz)   SpO2 96%   BMI 34.27 kg/m²       O2 Device: None (Room air)   O2 Flow Rate (L/min): 2 l/min   Temp (24hrs), Av.8 °F (36.6 °C), Min:97.4 °F (36.3 °C), Max:98.4 °F (36.9 °C)       Intake/Output:   Last shift:      11/15 1901 -  07  In: 1463.1 [I.V.:1463.1]  Out: -   Last 3 shifts: 701 - 11/15 1900  In: 642.8 [I.V.:642.8]  Out: 5 [Urine:5]    Intake/Output Summary (Last 24 hours) at 2022 0444  Last data filed at 2022 0400  Gross per 24 hour   Intake 2105.91 ml   Output 5 ml   Net 2100.91 ml          Current Facility-Administered Medications   Medication Dose Route Frequency    vancomycin (VANCOCIN) 1250 mg in  ml infusion  1,250 mg IntraVENous ONCE    DOPamine (INTROPIN) 800 mg in dextrose 5% 500 mL infusion  5-20 mcg/kg/min IntraVENous TITRATE    epoetin caitlin-epbx (RETACRIT) injection 8,000 Units  8,000 Units SubCUTAneous Q TUE, THU & SAT    insulin lispro (HUMALOG) injection   SubCUTAneous AC&HS    0.9% sodium chloride infusion  50 mL/hr IntraVENous CONTINUOUS    aspirin chewable tablet 81 mg  81 mg Oral DAILY    clopidogreL (PLAVIX) tablet 75 mg  75 mg Oral DAILY    [Held by provider] midodrine (PROAMATINE) tablet 10 mg  10 mg Oral TID WITH MEALS    Vancomycin: Pharmacy to dose   Other Rx Dosing/Monitoring    cefepime (MAXIPIME) 1 g in 0.9% sodium chloride (MBP/ADV) 50 mL MBP  1 g IntraVENous Q24H    NOREPINephrine (LEVOPHED) 8 mg in 5% dextrose 250mL (32 mcg/mL) infusion  0.5-50 mcg/min IntraVENous TITRATE    sodium chloride (NS) flush 5-40 mL  5-40 mL IntraVENous Q8H    heparin (porcine) injection 5,000 Units  5,000 Units SubCUTAneous Q8H    DULoxetine (CYMBALTA) capsule 20 mg  20 mg Oral DAILY    famotidine (PF) (PEPCID) 20 mg in 0.9% sodium chloride 10 mL injection  20 mg IntraVENous Q24H    thiamine (B-1) 200 mg in 0.9% sodium chloride 50 mL IVPB  200 mg IntraVENous DAILY    folic acid (FOLVITE) tablet 1 mg  1 mg Oral DAILY         Telemetry: [x]gavino []A-flutter []Paced    []A-fib []Multiple PVCs                  Physical Exam:      General: awake, alert, NAD   HEENT:  Anicteric sclerae; pink palpebral conjunctivae; mucosa moist  Resp:  Symmetrical chest expansion, no accessory muscle use; good airway entry; no rales/ wheezing/ rhonchi noted  CV:  S1, S2 present; regular rate and rhythm  GI:  Abdomen soft, non-tender; (+) active bowel sounds  Extremities:  diminished on all extremities; no edema/ cyanosis/ clubbing noted, left upper arm fistula   Skin:  Warm; no rashes/ lesions noted, normal turgor/cap refill   Neurologic:  Non-focal  Devices:  femoral CVL       DATA:  MAR reviewed and pertinent medications noted or modified as needed    Labs:  Recent Labs     11/16/22  0238 11/15/22  0400 11/14/22  1330   WBC 11.7 16.4* 8.9   HGB 8.7* 9.5* 10.4*   HCT 26.7* 29.3* 32.3*    309 282     Recent Labs     11/16/22  0238 11/15/22  1700 11/15/22  0400 11/14/22 2026 11/14/22  1330     --  136 137 139   K 3.7  --  3.6 5.2 3.8     --  101 102 101   CO2 27  --  26 27 28   *  --  170* 109* 147*   BUN 23*  --  20* 17 15   CREA 7.47*  --  6.22* 5.79* 5.42*   CA 8.7  --  8.4* 8.5 9.1   MG 2.5 2.4 1.8  --  1.8   PHOS 4.7  --  4.0  --   --    ALB  --   --   --   --  3.3*   ALT  --   --   --   --  15   INR  --   --   --   --  1.0     No results for input(s): PH, PCO2, PO2, HCO3, FIO2 in the last 72 hours. No results for input(s): FIO2I, IFO2, HCO3I, IHCO3, HCOPOC, PCO2I, PCOPOC, IPHI, PHI, PHPOC, PO2I, PO2POC in the last 72 hours. No lab exists for component: IPOC2    Imaging:  [x]   I have personally reviewed the patients radiographs and reports  XR Results (most recent):  XR Results (most recent):  Results from Hospital Encounter encounter on 11/14/22    XR CHEST PORT    Narrative  EXAM: XR CHEST PORT    INDICATION: chest pain    COMPARISON: 10/21/2022.     FINDINGS: A single view of the chest demonstrates chronic linear scarring  without change. No new lung findings. Stable elevated right hemidiaphragm. .  Stable cardiac silhouette. . No acute bone findings. .    Impression  No acute findings or interval change. CT Results (most recent):  Results from Hospital Encounter encounter on 11/14/22    CTA CHEST ABD PELV W WO CONT    Narrative  CT angiogram aorta. CT chest, abdomen and pelvis without and with IV contrast.    HISTORY: Chest pain and back pain with hypotension. All CT scans at this facility are performed using dose optimization technique as  appropriate to a performed exam, to include automated exposure control,  adjustment of the mA and/or kV according to patient size (including appropriate  matching for site specific examination) or use of iterative reconstruction  technique. Dialysis patient, to get dialysis the next morning. Axial CT images obtained. Sagittal and coronal MIP images of the aorta were  generated. Dedicated 3-D images of the aorta and its branches also generated on  a dedicated workstation. Sagittal and coronal images of abdomen and pelvis also  generated. CT angiogram aorta: Noncontrast study shows no intramural hematoma. Contrast  study shows normal caliber throughout. No aortic dissection. Moderate  atherosclerotic calcification present. No focal aneurysm. Some degree of  stenosis due to plaques in the celiac and SMA origins. PATRICIO is occluded, also  obscured by motion artifacts. Stenotic renal arteries. Iliac arteries are within  normal caliber. Minimal ectasia of the bifurcation. CT CHEST: No pulmonary infiltrate or consolidation. No pleural effusion or  pneumothorax. No mediastinal or hilar mass. No cardiomegaly or pericardial  effusion. Corticated calcification noted. No central pulmonary embolism. CT ABDOMEN/PELVIS: Liver and spleen unremarkable. Cholecystectomy. Small hiatal  hernia. Gastric surgery. Pancreas grossly unremarkable.  No adrenal mass.  Atrophic kidneys with cystic lesions. Double J ureteral stent on the right. No  surrounding inflammation. Small bowel and colon not distended. No extraluminal inflammation. Suspect  scattered colonic diverticulosis. No ascites or free air. Mild subcutaneous edema throughout. Small amount of excreted contrast material  in the decompressed bladder. Uterus unremarkable. No pelvic mass. Degenerative disease in the thoracolumbar spine. Old left inferior pubic ramal  fracture. Impression  1. No aortic aneurysm or dissection. Moderately advanced atherosclerotic  disease. 2. No acute abnormalities in the chest, abdomen or pelvis. 08/15/22    ECHO CATINA W OR WO CONTRAST 08/15/2022 8/15/2022    Interpretation Summary    Left Ventricle: Normal left ventricular systolic function with a visually estimated EF of 55 - 60%. Left Atrium: No left atrial appendage thrombus noted. Watchman well-seated with no residual jet around the device. Signed by: Gurvinder Stephens MD on 8/15/2022 12:22 PM       IMPRESSION:   Acute on chronic hypotension (OP midodrine) requiring levophed, ? SIRs/Sepsis with recent instrumentation of stent vs hypovolemia due to poor intake vs symptomatic bradycardia  Symptomatic bradycardia- since starting of digoxin in the last month  Sepsis POA- lactic acidosis + hypotension. Now with leukocytosis.  BC x 1 (+) GPC   UTI   Hallucinations- ?due to morphine vs delirium  N/V/D x mos with poor PO intake, suspect gastroparesis, CT abd neg   Hx a fib s/p watchman procedure   ESRD on HD  Hx hydronephrosis with chronic right ureteral stent, recent exchanged 11/8  DM2 with diabetic neuropathy, suspect DAN  Debility with functional weakness, needs assistance with ADLs  HX anxiety/depression      Patient Active Problem List   Diagnosis Code    History of acute pyelonephritis Z87.448    History of infection with vancomycin resistant Enterococcus (VRE) Z86.19    Anemia in end-stage renal disease ([facility] N18.6, D63.1    Chronic hypotension I95.89    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 ([facility]) E11.22, N18.6    Secondary hyperparathyroidism of renal origin ([facility] N25.81    Gastroesophageal reflux disease K21.9    History of recurrent urinary tract infection Z87.440    History of sepsis Z86.19    End-stage renal disease on hemodialysis ([facility]) N18.6, Z99.2    History of hydronephrosis Z87.448    History of septic shock Z86.19    Anticoagulated by anticoagulation treatment Z79.01    Paroxysmal atrial fibrillation ([facility]) I48.0    Closed fracture of left inferior pubic ramus, with routine healing, subsequent encounter S32.592D    Closed nondisplaced fracture of anterior wall of left acetabulum with routine healing S32.415D    Closed fracture of superior pubic ramus, left, with routine healing, subsequent encounter S32.512D    Multiple closed pelvic fractures without disruption of pelvic ring ([facility] S32.82XA    Depression F32. A    History of urinary tract infection Z87.440    Need for prophylactic isolation Z41.8    Hyperlipidemia E78.5    Hypothyroidism E03.9    History of kidney stones Z87.442    Chronic pain G89.29    Lung mass R91.8    History of urethral stricture Z87.448    Uric acid nephrolithiasis N20.0    Urinary incontinence R32    Glaucoma H40.9    Hyperphosphatemia E83.39    Mononeuropathy G58.9    Hyperkalemia E87.5    Gross hematuria R31.0    Impaired mobility and ADLs Z74.09, Z78.9    Stricture of female urethra N35.92    ESRD on dialysis ([facility]) N18.6, Z99.2    SOB (shortness of breath) on exertion R06.02    A-fib ([facility]) I48.91    Presence of Watchman left atrial appendage closure device Z95.818    Ureteral stricture N13.5    Bradycardia R00.1    Hypotension I95.9        RECOMMENDATIONS:   Neuro: pain medication restarted, cymbalta restarted  Pulm: Supplemental O2 via NC, titrate flow for goal SPO2> 90% Bronchodilators, steroids, pulmonary hygiene care, Aspiration precautions, Keep HOB >30 degrees  CVS :Actively titrate vasopressors aim systolic >38 mmHg, hold rate controlling medications, BP/symptoms improving, cards to see tomorrow   GI: SUP,Zofran PRN for N/V, Diet- ADULT DIET Regular, consider GI consult for persistent n/v   Renal:  Trend Renal indices, nephro following   Hem/Onc: Monitor for s/o active bleeding. I/D:Sepsis bundle per hospital protocol, Blood and Urine cultures drawn and will be followed. BC (+) x 1 GPC Lactic acid normalized. Antibiotics:cefepime, vanco Trend WBCs and temperature curve. Procal was low but wait or cx's to return before d/c abx. Resp viral panel (-)  Endocrine: Q6 glucoses, SSI. TSH normal   Metabolic:  Daily BMP, mag, phos. Trend lytes, replace as needed. Musc/Skin: no acute issues  DNR/DNI, signed POST w limited interventions   Discussed in interdisciplinary rounds     Best practice :    Glycemic control  IHI ICU bundles:   Central Line Bundle Followed     Stress ulcer prophylaxis. Pepcid  DVT prophylaxis. SQH  Need for Lines, chua assessed. Palliative care evaluation. Restraints need. 15 minutes were spent with the patient at the bedside. This care involved high complexity decision making to assess, manipulate, and support vital system functions, to treat this degreee vital organ system failure and to prevent further life threatening deterioration of the patients condition  The services I provided to this patient were to treat and/or prevent clinically significant deterioration that could result in the failure of one or more body systems and/or organ systems due to respiratory distress, hypoxia, cardiac dysrhythmia.        Madi Tyler PA-C   11/16/22  Pulmonary, Critical Care Medicine  [facility]

## 2022-11-16 NOTE — PROGRESS NOTES
Problem: Falls - Risk of  Goal: *Absence of Falls  Description: Document Thompson Medel Fall Risk and appropriate interventions in the flowsheet.   Outcome: Progressing Towards Goal  Note: Fall Risk Interventions:       Mentation Interventions: Bed/chair exit alarm, Door open when patient unattended, Evaluate medications/consider consulting pharmacy, Increase mobility, More frequent rounding, Reorient patient, Toileting rounds    Medication Interventions: Assess postural VS orthostatic hypotension, Bed/chair exit alarm, Evaluate medications/consider consulting pharmacy    Elimination Interventions: Bed/chair exit alarm, Call light in reach, Elevated toilet seat    History of Falls Interventions: Bed/chair exit alarm, Consult care management for discharge planning, Door open when patient unattended, Evaluate medications/consider consulting pharmacy         Problem: Patient Education: Go to Patient Education Activity  Goal: Patient/Family Education  Outcome: Progressing Towards Goal     Problem: Pain  Goal: *Control of Pain  Outcome: Progressing Towards Goal     Problem: Patient Education: Go to Patient Education Activity  Goal: Patient/Family Education  Outcome: Progressing Towards Goal     Problem: Hypotension  Goal: *Blood pressure within specified parameters  Outcome: Progressing Towards Goal  Goal: *Fluid volume balance  Outcome: Progressing Towards Goal  Goal: *Labs within defined limits  Outcome: Progressing Towards Goal     Problem: Patient Education: Go to Patient Education Activity  Goal: Patient/Family Education  Outcome: Progressing Towards Goal     Problem: Delirium Treatment  Goal: *Level of consciousness restored to baseline  Outcome: Progressing Towards Goal  Goal: *Level of environmental perceptions restored to baseline  Outcome: Progressing Towards Goal  Goal: *Sensory perception restored to baseline  Outcome: Progressing Towards Goal  Goal: *Emotional stability restored to baseline  Outcome: Progressing Towards Goal  Goal: *Functional assessment restored to baseline  Outcome: Progressing Towards Goal  Goal: *Absence of falls  Outcome: Progressing Towards Goal  Goal: *Will remain free of delirium, CAM Score negative  Outcome: Progressing Towards Goal  Goal: *Cognitive status will be restored to baseline  Outcome: Progressing Towards Goal  Goal: Interventions  Outcome: Progressing Towards Goal     Problem: Patient Education: Go to Patient Education Activity  Goal: Patient/Family Education  Outcome: Progressing Towards Goal     Problem: Pressure Injury - Risk of  Goal: *Prevention of pressure injury  Description: Document Giovany Scale and appropriate interventions in the flowsheet. Outcome: Progressing Towards Goal  Note: Pressure Injury Interventions: Activity Interventions: Assess need for specialty bed, Chair cushion, Increase time out of bed, Pressure redistribution bed/mattress(bed type), PT/OT evaluation    Mobility Interventions: Assess need for specialty bed, Chair cushion, HOB 30 degrees or less, Pressure redistribution bed/mattress (bed type), Turn and reposition approx.  every two hours(pillow and wedges)    Nutrition Interventions: Document food/fluid/supplement intake, Discuss nutritional consult with provider    Friction and Shear Interventions: Apply protective barrier, creams and emollients, Foam dressings/transparent film/skin sealants, HOB 30 degrees or less, Lift sheet, Lift team/patient mobility team, Minimize layers                Problem: Patient Education: Go to Patient Education Activity  Goal: Patient/Family Education  Outcome: Progressing Towards Goal

## 2022-11-16 NOTE — PROGRESS NOTES
Cardiology referral request from Dr. Magdy Richter for evaluation and management/treatment of bradycardia     Date of  Admission: 11/14/2022  1:34 PM   Primary Care Physician:  Kenzie Tao MD    Attending Cardiologist: Dr. Campbell Oshea:  Denies any dizziness. Denies any chest pain or chest tightness. No significant shortness of breath. Assessment:     -A/c hypotension, on Midodrine as outpatient. Multifactorial in setting of below. -Bradycardia, on 12.5 mg lopressor BID and digoxin EOD. HR in the low 40s on prior ECGs and hospitalizations over the last year. Suspect underlying SSS. -Chronic N/V/D, possible gastroparesis. -Chronic dizziness, at baseline.   -Chest pain, MI r/o with serial negative cardiac biomarkers. S/p C 05/2022 without evidence of obstructive CAD. Moderate disease noted in distal RCA. -Paroxysmal Afib. s/p Watchman GOLD device 6/27/22  -Hydronephrosis with right ureteral stent, s/p exchange 11/8/22. -HLD, not on statin d/t intolerance. -DM with neuropathy. -ESRD on HD, M/W/F  -Anemia of chronic disease.   -Chronic hypotension, on Midodrine. -H/o hematuria. -Hypothyroidism on synthroid  -Poor functional status         Primary cardiologist is Dr. Parviz Rai.     Plan: This is a 24-year-old female who has had progressive decline in functional status over the past couple years. She has relative bradycardia with hypotension on pressors. She is on dialysis. Outpatient low-dose of metoprolol and digoxin has been discontinued  On telemetry monitor appears to be junctional rhythm with heart rate in 60s. Currently on Levophed and dopamine  Patient is deciding overall goals of care and not sure if she is wanting a pacemaker at this time.   Currently on midodrine  Have discussed with nursing staff to try to wean off dopamine and repeat EKG  Pacemaker can be considered for bradycardia however not entirely sure if hypotension which she has chronically will improve with pacemaker  Discussed with ICU attending     History of Present Illness: This is a 76 y.o. female admitted for Hypotension [I95.9]  Bradycardia [R00.1]. Patient complains of: CP, SOB   Valery Stern is a 76 y.o. female, pmhx as stated above, who we are seeing for bradycardia. Patient reports having CP during HD yesterday. She states near the end of HD, her arm was bleeding from her line and her chest started to hurt from seeing the blood. Pain self resolved after arriving to the ER. In the ER it was noted her SBP was in the 50s, HR in the 50s. She is chronically hypotensive requiring Midodrine. She was given a 250 cc bolus of fluid with slight improvement of her pressures. She was then started on her outpatient midodrine and pressor support. She takes digoxin and lopressor for her AF rate control as an outpatient. She states she is taking them as directed. She reports since starting dialysis, her QOL has been declining She is getting progressively more weak with dialysis. Denies SOB, worsening dizziness, near syncope or syncope, diaphoresis, palpitations. Cardiac risk factors: dyslipidemia, diabetes mellitus, obesity, sedentary life style, post-menopausal  Review of Symptoms:  Except as stated above include:  Constitutional:  negative  Respiratory:  negative  Cardiovascular:  negative  Gastrointestinal: negative  Genitourinary:  negative  Musculoskeletal:  Negative  Neurological:  Negative  Dermatological:  Negative  Endocrinological: Negative  Psychological:  Negative    A comprehensive review of systems was negative except for that written in the HPI.      Past Medical History:     Past Medical History:   Diagnosis Date    Anemia in end-stage renal disease ([facility] [address]     Anticoagulated by anticoagulation treatment     On Apixaban    Chronic hypotension     On Midodrine    Chronic kidney disease     ESRD on HD MWF    Chronic pain     Closed fracture of left inferior pubic ramus, with routine healing, subsequent encounter 11/12/2021    Closed fracture of superior pubic ramus, left, with routine healing, subsequent encounter 11/12/2021    Closed nondisplaced fracture of anterior wall of left acetabulum with routine healing 11/12/2021    Depression     End-stage renal disease on hemodialysis ([address]     HD at [address] on [facility] [address] on MWF.  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    Urine culture (collected 10/8/2021, resulted 10/14/2021) yielded growth of >100,000 colonies/ml of Enterococcus faecalis RESISTANT to Ciprofloxacin, Levofloxacin, Tetracycline and Vancomycin    History of kidney stones     History of recurrent urinary tract infection     History of sepsis 06/18/2021    History of septic shock 10/08/2021    History of urethral stricture     History of urinary tract infection 10/08/2021    Urine culture (collected 10/8/2021, resulted 10/14/2021) yielded growth of >100,000 colonies/ml of Enterococcus faecalis RESISTANT to Ciprofloxacin, Levofloxacin, Tetracycline and Vancomycin    Hyperlipidemia     Hyperphosphatemia 11/14/2021    Hypothyroidism     Lung mass     Mononeuropathy     Involving ring finger of left hand    Need for prophylactic isolation 10/08/2021    Urine culture (collected 10/8/2021, resulted 10/14/2021) yielded growth of >100,000 colonies/ml of Enterococcus faecalis RESISTANT to Ciprofloxacin, Levofloxacin, Tetracycline and Vancomycin    Osteoporosis     Paroxysmal atrial fibrillation ([address]     Secondary hyperparathyroidism of renal origin ([address]     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 ([address]     HbA1c (10/8/2021) = 4.6    Uric acid nephrolithiasis     Urinary incontinence          Social History:     Social History     Socioeconomic History    Marital status: SINGLE   Tobacco Use    Smoking status: Never    Smokeless tobacco: Never   Vaping Use    Vaping Use: Never used   Substance and Sexual Activity    Alcohol use: Never    Drug use: Never        Family History:     Family History   Problem Relation Age of Onset    Heart Surgery Sister         Medications:      Allergies   Allergen Reactions    Albumin, Human 25 % Itching     Headache - severe migraine like, itchy eyes, runny nose    Ciprofloxacin Hives    Cyclopentolate Unknown (comments)    Iron Sucrose Diarrhea    Statins-Hmg-Coa Reductase Inhibitors Other (comments)     Body ache        Current Facility-Administered Medications   Medication Dose Route Frequency    vancomycin (VANCOCIN) 1250 mg in  ml infusion  1,250 mg IntraVENous ONCE    morphine IR (MS IR) tablet 7.5 mg  7.5 mg Oral DAILY PRN    DOPamine (INTROPIN) 800 mg in dextrose 5% 500 mL infusion  5-20 mcg/kg/min IntraVENous TITRATE    epoetin caitlin-epbx (RETACRIT) injection 8,000 Units  8,000 Units SubCUTAneous Q TUE, THU & SAT    insulin lispro (HUMALOG) injection   SubCUTAneous AC&HS    glucose chewable tablet 16 g  4 Tablet Oral PRN    glucagon (GLUCAGEN) injection 1 mg  1 mg IntraMUSCular PRN    dextrose 10% infusion 0-250 mL  0-250 mL IntraVENous PRN    0.9% sodium chloride infusion  50 mL/hr IntraVENous CONTINUOUS    aspirin chewable tablet 81 mg  81 mg Oral DAILY    clopidogreL (PLAVIX) tablet 75 mg  75 mg Oral DAILY    [Held by provider] midodrine (PROAMATINE) tablet 10 mg  10 mg Oral TID WITH MEALS    Vancomycin: Pharmacy to dose   Other Rx Dosing/Monitoring    cefepime (MAXIPIME) 1 g in 0.9% sodium chloride (MBP/ADV) 50 mL MBP  1 g IntraVENous Q24H    NOREPINephrine (LEVOPHED) 8 mg in 5% dextrose 250mL (32 mcg/mL) infusion  0.5-50 mcg/min IntraVENous TITRATE    sodium chloride (NS) flush 5-40 mL  5-40 mL IntraVENous Q8H    sodium chloride (NS) flush 5-40 mL  5-40 mL IntraVENous PRN    acetaminophen (TYLENOL) tablet 650 mg  650 mg Oral Q6H PRN    Or    acetaminophen (TYLENOL) suppository 650 mg  650 mg Rectal Q6H PRN    polyethylene glycol (MIRALAX) packet 17 g  17 g Oral DAILY PRN    ondansetron (ZOFRAN ODT) tablet 4 mg  4 mg Oral Q8H PRN    Or    ondansetron (ZOFRAN) injection 4 mg  4 mg IntraVENous Q6H PRN    heparin (porcine) injection 5,000 Units  5,000 Units SubCUTAneous Q8H    DULoxetine (CYMBALTA) capsule 20 mg  20 mg Oral DAILY    famotidine (PF) (PEPCID) 20 mg in 0.9% sodium chloride 10 mL injection  20 mg IntraVENous Q24H    thiamine (B-1) 200 mg in 0.9% sodium chloride 50 mL IVPB  200 mg IntraVENous DAILY    folic acid (FOLVITE) tablet 1 mg  1 mg Oral DAILY         Physical Exam:   Visit Vitals  BP (!) 138/118   Pulse 63   Temp 98.2 °F (36.8 °C)   Resp 16   Ht 5' 2\" (1.575 m)   Wt 85 kg (187 lb 6.3 oz)   SpO2 100%   BMI 34.27 kg/m²       TELE: Bradycardia     BP Readings from Last 3 Encounters:   11/16/22 (!) 138/118   11/08/22 (!) 95/23   10/21/22 (!) 98/54     Pulse Readings from Last 3 Encounters:   11/16/22 63   11/08/22 74   10/21/22 (!) 56     Wt Readings from Last 3 Encounters:   11/14/22 85 kg (187 lb 6.3 oz)   11/08/22 91.6 kg (202 lb)   11/01/22 91.6 kg (202 lb)       General:  alert, cooperative, no distress, appears stated age  Neck:  no JVD  Lungs:  clear to auscultation bilaterally  Heart:  bradycardic rate, reg rhythm   Abdomen:  abdomen is soft   Extremities:  extremities normal, atraumatic, no cyanosis or edema  euro: alert, oriented x3,      Data Review:     Recent Labs     11/16/22  0238 11/15/22  0400 11/14/22  1330   WBC 11.7 16.4* 8.9   HGB 8.7* 9.5* 10.4*   HCT 26.7* 29.3* 32.3*    309 282       Recent Labs     11/16/22  0238 11/15/22  1700 11/15/22  0400 11/14/22 2026 11/14/22  1330     --  136 137 139   K 3.7  --  3.6 5.2 3.8     --  101 102 101   CO2 27  --  26 27 28   *  --  170* 109* 147*   BUN 23*  --  20* 17 15   CREA 7.47*  --  6.22* 5.79* 5.42*   CA 8.7  --  8.4* 8.5 9.1   MG 2.5 2.4 1.8  --  1.8   PHOS 4.7  --  4.0 --   --    ALB  --   --   --   --  3.3*   ALT  --   --   --   --  15   INR  --   --   --   --  1.0         Results for orders placed or performed during the hospital encounter of 11/14/22   EKG, 12 LEAD, INITIAL   Result Value Ref Range    Ventricular Rate 50 BPM    Atrial Rate 64 BPM    QRS Duration 94 ms    Q-T Interval 418 ms    QTC Calculation (Bezet) 381 ms    Calculated R Axis -27 degrees    Calculated T Axis -135 degrees    Diagnosis       Probable Junctional rhythm  ST & T wave abnormality, consider inferolateral ischemia  Abnormal ECG  When compared with ECG of 08-NOV-2022 13:37,  Junctional rhythm has replaced Atrial fibrillation  Minimal criteria for Anterior infarct are no longer present  Inverted T waves have replaced nonspecific T wave abnormality in Lateral   leads  QT has shortened  Confirmed by Ion Crocker MD, --- ([id number]) on 11/14/2022 4:50:01 PM         All Cardiac Markers in the last 24 hours:  No results found for: CPK, CK, CKMMB, CKMB, RCK3, CKMBT, CKNDX, CKND1, PATTI, TROPT, TROIQ, GRAHAM, TROPT, TNIPOC, BNP, BNPP    Last Lipid:  No results found for: CHOL, CHOLX, CHLST, CHOLV, HDL, HDLP, LDL, LDLC, DLDLP, TGLX, TRIGL, TRIGP, CHHD, CHHDX    Cardiographics:     EKG Results       Procedure 720 Value Units Date/Time    EKG, 12 LEAD, SUBSEQUENT [[id number]] Collected: 11/15/22 1503    Order Status: Completed Updated: 11/15/22 2043     Ventricular Rate 59 BPM      QRS Duration 98 ms      Q-T Interval 298 ms      QTC Calculation (Bezet) 295 ms      Calculated R Axis -24 degrees      Calculated T Axis -173 degrees      Diagnosis --     Junctional rhythm  Low voltage QRS  Nonspecific ST and T wave abnormality  Abnormal ECG  When compared with ECG of 14-NOV-2022 19:24,  premature supraventricular complexes are no longer present  QT has shortened  Confirmed by Mirna Mohs ([id number]) on 11/15/2022 8:43:43 PM      EKG, 12 LEAD, REPEAT [[id number]] Collected: 11/14/22 1924    Order Status: Completed Updated: 11/1943     Ventricular Rate 56 BPM      QRS Duration 86 ms      Q-T Interval 410 ms      QTC Calculation (Bezet) 395 ms      Calculated R Axis -27 degrees      Calculated T Axis -175 degrees      Diagnosis --     Junctional rhythm with premature supraventricular complexes in a pattern of   bigeminy  Low voltage QRS  Nonspecific ST and T wave abnormality  Abnormal ECG  When compared with ECG of 14-NOV-2022 13:40,  premature supraventricular complexes are now present  Confirmed by Nanci Vivar ([id number]) on 11/15/2022 7:42:48 PM      EKG, 12 LEAD, INITIAL [[id number]] Collected: 11/14/22 1340    Order Status: Completed Updated: 11/14/22 1650     Ventricular Rate 50 BPM      Atrial Rate 64 BPM      QRS Duration 94 ms      Q-T Interval 418 ms      QTC Calculation (Bezet) 381 ms      Calculated R Axis -27 degrees      Calculated T Axis -135 degrees      Diagnosis --     Probable Junctional rhythm  ST & T wave abnormality, consider inferolateral ischemia  Abnormal ECG  When compared with ECG of 08-NOV-2022 13:37,  Junctional rhythm has replaced Atrial fibrillation  Minimal criteria for Anterior infarct are no longer present  Inverted T waves have replaced nonspecific T wave abnormality in Lateral   leads  QT has shortened  Confirmed by Julio Mendez MD, --- ([id number]) on 11/14/2022 4:50:01 PM            08/15/22    ECHO CATINA W OR WO CONTRAST 08/15/2022 8/15/2022    Interpretation Summary    Left Ventricle: Normal left ventricular systolic function with a visually estimated EF of 55 - 60%. Left Atrium: No left atrial appendage thrombus noted. Watchman well-seated with no residual jet around the device. Signed by: Pepito Spangler MD on 8/15/2022 12:22 PM      11/11/20    NUCLEAR CARDIAC STRESS TEST 11/11/2020 11/11/2020    Interpretation Summary  · Baseline ECG: Normal sinus rhythm. · Negative stress test.  · Gated SPECT: Left ventricular function post-stress was normal. Calculated ejection fraction is 67%.  There is no evidence of transient ischemic dilation (TID). The TID ratio is 0.91.  · Myocardial perfusion imaging supports a low risk stress test.  · Left ventricular perfusion is normal.    Signed by: Patricia Hurtado MD on 11/11/2020 12:47 PM    08/18/22    INVASIVE VASCULAR PROCEDURE 08/18/2022 8/18/2022    Narrative  See op note    Signed by: Nela Casillas MD on 8/18/2022  9:53 AM      XR Results (most recent):  Results from [place] encounter on 11/14/22    XR CHEST PORT    Narrative  EXAM: XR CHEST PORT    INDICATION: chest pain    COMPARISON: 10/21/2022. FINDINGS: A single view of the chest demonstrates chronic linear scarring  without change. No new lung findings. Stable elevated right hemidiaphragm. .  Stable cardiac silhouette. . No acute bone findings. .    Impression  No acute findings or interval change.         Signed By: Elyse Butler MD     November 16, 2022

## 2022-11-16 NOTE — INTERDISCIPLINARY ROUNDS
[facility]  Pulmonary, Critical Care, and Sleep Medicine  Interdisciplinary and Ventilator Weaning Rounds    Patient discussed in morning walking rounds and interdisciplinary rounds. ICU day: 11/15/22     Overnight events:   Vomiting overnight. Given Zofran with no relief. Ativan provided relief   Unable to tolerate PO     Assessments and best practice:  Ventilator  N/A     Sedation  N/A   Other pertinent drips  levophed and dopamine , NS @ 50 ml/hr   Lines noted  Femoral CVL , PIV   Critical labs assessed  Yes  Antibiotics  Vancomycin and Cefepime  Medications reviewed  Yes  Pending imaging  none  Pending send out labs  No  Pending Procedures  Paracentesis? Glycemic control  SSI   Stress ulcer prophylaxis. Pepcid  DVT prophylaxis. SQH  Need for Lines, chua assessed. Yes  Restraint Reevaluation     Restraints       Family contact/MPOA: Claudia Sorto (son)   Family updated:  To be updated by ICU staff     Palliative consult within 3 days of admission to ICU-  Ethics Guidance: 21 days      Daily Plans:  Cont to wean Levophed and Dopamine as tolerated   Consult ID  Needs pacemaker   Hold off on HD, labs reviewed     LIZA time 10 minutes        Asha Smith NP  11/16/22  Pulmonary, [address] [facility],[address] [facility]

## 2022-11-16 NOTE — PROGRESS NOTES
Palliative Medicine follow-up note    Patient Name: Orlando Dee  YOB: 1945    Date of Initial Consult: November 15, 2022  Date of service: 11/16/2022  Reason for Consult: goals of care discussion and hospice discussion  Requesting Provider: ICU team  Primary Care Physician: Ray Gandara MD      SUMMARY:     Orlando Dee is a 76 y.o. with a past history of chronic hypotension, right hydronephrosis with stent, depression, A. fib, diabetes with neuropathy and ESRD on hemodialysis, who was admitted on 11/14/2022 from home with a diagnosis of acute on chronic hypotension, symptomatic bradycardia, diarrhea and hallucination. Reportedly, patient had nausea vomiting and diarrhea since She was started on digoxin. She started having significant weakness and decided to come to the emergency room. In the emergency room patient was noted to have hypotension and bradycardia. Was started on Levophed and dopamine. Patient expressed her wish to ICU team about stopping all the treatment and wanted to talk to hospice. Current medical issues leading to Palliative Medicine involvement include: To discuss goals of care including hospice discussion. 11/16/22: Patient was seen in presence of palliative care RN. Patient denies any headaches or dizziness currently. No chest pain or abdominal pain currently. Off-and-on shortness of breath present. No nausea or vomiting. She states she talked to her son yesterday about her wishes. She also expresses her wish about not having pacemaker. Patient is currently on Levophed and dopamine drips. 11/15/22: Patient was seen in presence of palliative care staff members. Patient is able to carry on conversation without any issues. Denies headaches or dizziness. Denies any chest pain or abdominal pain. Dyspnea on exertion present. No nausea or vomiting currently. She states she has been on dialysis for last 2 years. She lives with her son and daughter-in-law.   Her dialysis doctor's name is Dr. Gertrudis Jay. PALLIATIVE DIAGNOSES:   Goals of care discussion with hospice care discussion  2. ESRD on hemodialysis  3. Acute on chronic hypotension requiring IV pressors  4. Sinus bradycardia  5. Poor functional status    Patient Active Problem List   Diagnosis Code    History of acute pyelonephritis Z87.448    History of infection with vancomycin resistant Enterococcus (VRE) Z86.19    Anemia in end-stage renal disease (HCC) N18.6, D63.1    Chronic hypotension I95.89    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 ([facility]) E11.22, N18.6    Secondary hyperparathyroidism of renal origin ([facility] N25.81    Gastroesophageal reflux disease K21.9    History of recurrent urinary tract infection Z87.440    History of sepsis Z86.19    End-stage renal disease on hemodialysis ([facility]) N18.6, Z99.2    History of hydronephrosis Z87.448    History of septic shock Z86.19    Anticoagulated by anticoagulation treatment Z79.01    Paroxysmal atrial fibrillation ([facility]) I48.0    Closed fracture of left inferior pubic ramus, with routine healing, subsequent encounter S32.592D    Closed nondisplaced fracture of anterior wall of left acetabulum with routine healing S32.415D    Closed fracture of superior pubic ramus, left, with routine healing, subsequent encounter S32.512D    Multiple closed pelvic fractures without disruption of pelvic ring ([facility] S32.82XA    Depression F32. A    History of urinary tract infection Z87.440    Need for prophylactic isolation Z41.8    Hyperlipidemia E78.5    Hypothyroidism E03.9    History of kidney stones Z87.442    Chronic pain G89.29    Lung mass R91.8    History of urethral stricture Z87.448    Uric acid nephrolithiasis N20.0    Urinary incontinence R32    Glaucoma H40.9    Hyperphosphatemia E83.39    Mononeuropathy G58.9    Hyperkalemia E87.5    Gross hematuria R31.0    Impaired mobility and ADLs Z74.09, Z78.9    Stricture of female urethra N35.92    ESRD on dialysis ([facility] N18.6, Z99.2    SOB (shortness of breath) on exertion R06.02    A-fib ([facility]) I48.91    Presence of Watchman left atrial appendage closure device Z95.818    Ureteral stricture N13.5    Bradycardia R00.1    Hypotension I95.9          GOALS OF CARE / TREATMENT PREFERENCES:     GOALS OF CARE:    11/16/22: Patient was seen in presence of palliative care RN. She is awake and oriented x3. She remembers me from previous visit. She knows the purpose of our visit. She stated that her son came to visit her yesterday and she already informed him what she wants. She understands the consequences of stopping dialysis and not having pacemaker. She stated she wants to go home with hospice. She gave me permission to talk to her son. I spoke to patient's son over the phone and informed him about my conversation with the patient. He stated that they would like to start hospice upon discharge and until then they would like to continue current medical treatment and he also understands that they do not want her mother to have a pacemaker. I informed him that we will try to wean her off medication and see she remained stable if not, we will talk to his mom and him on Friday about inpatient comfort measures. He verbalized understanding about it. I went back to inform patient about it and she also completed the post for hospice upon discharge. I called patient's nephrologist and informed him about patient's decision stopping dialysis upon discharge. Patient also requested me to put her on some medications especially for anxiety and shortness of breath on exertion. Patient will be started on Ativan and morphine as needed. Plan: Patient is DNR/DNI, limited intervention until she stays here, hospice upon discharge. 11/15/22: Patient was seen in presence of palliative care staff members. Patient is awake and oriented x3. We introduced ourselves and explained her the purpose of our visit.   Patient is able to carry on conversation. Patient has AMD and post form on the file. She wants her son and daughter-in-law to be medical power of  and understands the meaning of DNR/DNI. She wants to continue with current AMD in the post form. Patient stated that she is tired of being on dialysis and keep coming back and forth to the hospital.  We discussed with her about consequences of stopping dialysis including worsening symptoms in form of shortness of breath, delirium, nausea/vomiting etc.  She verbalized understanding about it. She said she has been on dialysis for last 2 years and does not think it is helping her. She lives with her son. She wants us to call her son to discuss the options about hospice at home versus nursing home. She is interested in talking to hospice for informational purposes only at this point. She also mentioned that she will talk to her son and daughter-in-law about her wheezes. She gave us permission to talk to her son. I called patient's son and informed him about the conversation we had with his mother. As per son: Patient goes in this phase off and on and wanted to make sure she understands the consequences of stopping dialysis. He stated he and his wife will come later on today to talk to patient but requested us not to start any other process until they talk to his mother. He is fine having hospice to provide information only. I did state to him that this is his mother's wish but we will hold on making any other decisions until he and his wife talk to patient and we will follow-up with patient tomorrow morning. He is fine with it. Plan: Patient will remain DNR/DNI, limited intervention at this point, hospice consult for information purposes only, will continue to follow this patient with you.       TREATMENT PREFERENCES:   Code Status: DNR    Advance Care Planning:  Advance Care Planning 11/15/2022   Patient's Healthcare Decision Maker is: Named in scanned ACP document   Confirm Advance Directive Yes, on file   Patient Would Like to Complete Advance Directive -   Does the patient have other document types Do Not Resuscitate          Other Instructions: PT and OT        HISTORY:     History obtained from: Patient    CHIEF COMPLAINT: Nausea vomiting and diarrhea    HPI/SUBJECTIVE:    The patient is:   [x] Verbal and participatory  [] Non-participatory due to:         FUNCTIONAL ASSESSMENT:     Palliative Performance Scale (PPS):40       Clinical Pain Assessment (nonverbal scale for severity on nonverbal patients):              PSYCHOSOCIAL/SPIRITUAL SCREENING:       Any spiritual / Oriental orthodox concerns:  [] Yes /  [x] No    Caregiver Burnout:  [] Yes /  [] No /  [x] No Caregiver Present      Anticipatory grief assessment:   [] Normal  / [] Maladaptive          REVIEW OF SYSTEMS:     Positive and pertinent negative findings in ROS are noted above in HPI. The following systems were [x] reviewed / [] unable to be reviewed as noted in HPI  Other findings are noted below. Systems: constitutional, ears/nose/mouth/throat, respiratory, gastrointestinal, genitourinary, musculoskeletal, integumentary, neurologic, psychiatric, endocrine. Positive findings noted below. Modified ESAS Completed by: provider                                            PHYSICAL EXAM:     From RN flowsheet:  Wt Readings from Last 3 Encounters:   11/14/22 85 kg (187 lb 6.3 oz)   11/08/22 91.6 kg (202 lb)   11/01/22 91.6 kg (202 lb)       BP (!) 138/118   Pulse 63   Temp 98.2 °F (36.8 °C)   Resp 16   Ht 5' 2\" (1.575 m)   Wt 85 kg (187 lb 6.3 oz)   SpO2 100%   BMI 34.27 kg/m²     Constitutional: Awake, oriented x3, not in acute distress, follows verbal commands appropriately. Eyes: pupils equal, anicteric  ENMT: no nasal discharge, moist mucous membranes  Cardiovascular: S1 S2 heard, no murmur appreciated by me.   Respiratory: Diminished breath sound bibasilar without any wheezes currently  Gastrointestinal: soft non-tender, +bowel sounds, nondistended  Musculoskeletal: No pitting pedal edema, no deformity, no tenderness to palpation  Skin: warm, dry  Neurologic: following commands, moving all extremities, no tremors noted today. Psychiatric: full affect, no hallucinations, no agitation currently  Other:     HISTORY:     Past Medical History:   Diagnosis Date    Anemia in end-stage renal disease ([facility] [address]     Anticoagulated by anticoagulation treatment     On Apixaban    Chronic hypotension     On Midodrine    Chronic kidney disease     ESRD on HD MWF    Chronic pain     Closed fracture of left inferior pubic ramus, with routine healing, subsequent encounter 11/12/2021    Closed fracture of superior pubic ramus, left, with routine healing, subsequent encounter 11/12/2021    Closed nondisplaced fracture of anterior wall of left acetabulum with routine healing 11/12/2021    Depression     End-stage renal disease on hemodialysis ([facility] [address]     HD at [facility] on MWF.  Tel # [phone]    Gastroesophageal reflux disease     Glaucoma     History of acute pyelonephritis 02/20/2020    History of hydronephrosis 10/05/2021    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    Urine culture (collected 10/8/2021, resulted 10/14/2021) yielded growth of >100,000 colonies/ml of Enterococcus faecalis RESISTANT to Ciprofloxacin, Levofloxacin, Tetracycline and Vancomycin    History of kidney stones     History of recurrent urinary tract infection     History of sepsis 06/18/2021    History of septic shock 10/08/2021    History of urethral stricture     History of urinary tract infection 10/08/2021    Urine culture (collected 10/8/2021, resulted 10/14/2021) yielded growth of >100,000 colonies/ml of Enterococcus faecalis RESISTANT to Ciprofloxacin, Levofloxacin, Tetracycline and Vancomycin    Hyperlipidemia     Hyperphosphatemia 11/14/2021    Hypothyroidism     Lung mass     Mononeuropathy     Involving ring finger of left hand    Need for prophylactic isolation 10/08/2021    Urine culture (collected 10/8/2021, resulted 10/14/2021) yielded growth of >100,000 colonies/ml of Enterococcus faecalis RESISTANT to Ciprofloxacin, Levofloxacin, Tetracycline and Vancomycin    Osteoporosis     Paroxysmal atrial fibrillation (HCC)     Secondary hyperpar[facility]     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 ([facility]     HbA1c (10/8/2021) = 4.6    Uric acid nephrolithiasis     Urinary incontinence       Past Surgical History:   Procedure Laterality Date    HX APPENDECTOMY      HX CHOLECYSTECTOMY      HX GASTRIC BYPASS      Gastric stapling    HX KNEE ARTHROSCOPY      HX UROLOGICAL      right PCN placement    HX UROLOGICAL  07/23/2018    RIGHT URETEROSCOPY WITH HOLMIUM LASER    IR EXCHANGE NEPHRO PERC LT SI  2/21/2020    IR EXCHANGE NEPHRO PERC RT SI  4/13/2020    IR EXCHANGE NEPHRO PERC RT SI  7/17/2020    IR NEPHROSTOMY PERC RT PLC CATH  SI  10/14/2020    IR NEPHROURETERAL PERC RT PLC CATH NEW ACCESS  SI  4/30/2020    IL INTRO CATH DIALYSIS CIRCUIT DX ANGRPH FLUOR S&I Left 9/24/2020    FISTULOGRAM LEFT/poss permanent catheter placement performed by Lay Page MD at [facility] CATH LAB    VASCULAR SURGERY PROCEDURE UNLIST      lef AVF      Family History   Problem Relation Age of Onset    Heart Surgery Sister       History reviewed, no pertinent family history.   Social History     Tobacco Use    Smoking status: Never    Smokeless tobacco: Never   Substance Use Topics    Alcohol use: Never     Allergies   Allergen Reactions    Albumin, Human 25 % Itching     Headache - severe migraine like, itchy eyes, runny nose    Ciprofloxacin Hives    Cyclopentolate Unknown (comments)    Iron Sucrose Diarrhea    Statins-Hmg-Coa Reductase Inhibitors Other (comments)     Body ache      Current Facility-Administered Medications   Medication Dose Route Frequency    vancomycin (VANCOCIN) 1250 mg in  ml infusion 1,250 mg IntraVENous ONCE    morphine IR (MS IR) tablet 7.5 mg  7.5 mg Oral DAILY PRN    DOPamine (INTROPIN) 800 mg in dextrose 5% 500 mL infusion  5-20 mcg/kg/min IntraVENous TITRATE    epoetin caitlin-epbx (RETACRIT) injection 8,000 Units  8,000 Units SubCUTAneous Q TUE, THU & SAT    insulin lispro (HUMALOG) injection   SubCUTAneous AC&HS    glucose chewable tablet 16 g  4 Tablet Oral PRN    glucagon (GLUCAGEN) injection 1 mg  1 mg IntraMUSCular PRN    dextrose 10% infusion 0-250 mL  0-250 mL IntraVENous PRN    0.9% sodium chloride infusion  50 mL/hr IntraVENous CONTINUOUS    aspirin chewable tablet 81 mg  81 mg Oral DAILY    clopidogreL (PLAVIX) tablet 75 mg  75 mg Oral DAILY    [Held by provider] midodrine (PROAMATINE) tablet 10 mg  10 mg Oral TID WITH MEALS    Vancomycin: Pharmacy to dose   Other Rx Dosing/Monitoring    cefepime (MAXIPIME) 1 g in 0.9% sodium chloride (MBP/ADV) 50 mL MBP  1 g IntraVENous Q24H    NOREPINephrine (LEVOPHED) 8 mg in 5% dextrose 250mL (32 mcg/mL) infusion  0.5-50 mcg/min IntraVENous TITRATE    sodium chloride (NS) flush 5-40 mL  5-40 mL IntraVENous Q8H    sodium chloride (NS) flush 5-40 mL  5-40 mL IntraVENous PRN    acetaminophen (TYLENOL) tablet 650 mg  650 mg Oral Q6H PRN    Or    acetaminophen (TYLENOL) suppository 650 mg  650 mg Rectal Q6H PRN    polyethylene glycol (MIRALAX) packet 17 g  17 g Oral DAILY PRN    ondansetron (ZOFRAN ODT) tablet 4 mg  4 mg Oral Q8H PRN    Or    ondansetron (ZOFRAN) injection 4 mg  4 mg IntraVENous Q6H PRN    heparin (porcine) injection 5,000 Units  5,000 Units SubCUTAneous Q8H    DULoxetine (CYMBALTA) capsule 20 mg  20 mg Oral DAILY    famotidine (PF) (PEPCID) 20 mg in 0.9% sodium chloride 10 mL injection  20 mg IntraVENous Q24H    thiamine (B-1) 200 mg in 0.9% sodium chloride 50 mL IVPB  200 mg IntraVENous DAILY    folic acid (FOLVITE) tablet 1 mg  1 mg Oral DAILY        LAB AND IMAGING FINDINGS:     Recent Results (from the past 24 hour(s)) GLUCOSE, POC    Collection Time: 11/15/22 12:02 PM   Result Value Ref Range    Glucose (POC) 157 (H) 70 - 110 mg/dL   EKG, 12 LEAD, SUBSEQUENT    Collection Time: 11/15/22  3:03 PM   Result Value Ref Range    Ventricular Rate 59 BPM    QRS Duration 98 ms    Q-T Interval 298 ms    QTC Calculation (Bezet) 295 ms    Calculated R Axis -24 degrees    Calculated T Axis -173 degrees    Diagnosis       Junctional rhythm  Low voltage QRS  Nonspecific ST and T wave abnormality  Abnormal ECG  When compared with ECG of 14-NOV-2022 19:24,  premature supraventricular complexes are no longer present  QT has shortened  Confirmed by Julia Mohs ([id number]) on 11/15/2022 8:43:43 PM     GLUCOSE, POC    Collection Time: 11/15/22  4:01 PM   Result Value Ref Range    Glucose (POC) 155 (H) 70 - 110 mg/dL   MAGNESIUM    Collection Time: 11/15/22  5:00 PM   Result Value Ref Range    Magnesium 2.4 1.6 - 2.6 mg/dL   URINALYSIS W/MICROSCOPIC    Collection Time: 11/15/22  6:00 PM   Result Value Ref Range    Color YELLOW      Appearance BLOODY      Specific gravity 1.020 1.005 - 1.030      pH (UA) >8.5 (H) 5.0 - 8.0    Protein >300 (A) NEG mg/dL    Glucose 100 (A) NEG mg/dL    Ketone Negative NEG mg/dL    Bilirubin MODERATE (A) NEG      Blood LARGE (A) NEG      Urobilinogen 1.0 0.2 - 1.0 EU/dL    Nitrites Positive (A) NEG      Leukocyte Esterase SMALL (A) NEG      WBC TOO NUMEROUS TO COUNT 0 - 4 /hpf    RBC TOO NUMEROUS TO COUNT 0 - 5 /hpf    Epithelial cells 4+ 0 - 5 /lpf    Bacteria 2+ (A) NEG /hpf    Other: Microscopic performed on unspun urine. QNS to spin.      GLUCOSE, POC    Collection Time: 11/15/22  9:52 PM   Result Value Ref Range    Glucose (POC) 150 (H) 70 - 045 mg/dL   METABOLIC PANEL, BASIC    Collection Time: 11/16/22  2:38 AM   Result Value Ref Range    Sodium 136 136 - 145 mmol/L    Potassium 3.7 3.5 - 5.5 mmol/L    Chloride 100 100 - 111 mmol/L    CO2 27 21 - 32 mmol/L    Anion gap 9 3.0 - 18 mmol/L    Glucose 152 (H) 74 - 99 mg/dL    BUN 23 (H) 7.0 - 18 MG/DL    Creatinine 7.47 (H) 0.6 - 1.3 MG/DL    BUN/Creatinine ratio 3 (L) 12 - 20      eGFR 5 (L) >60 ml/min/1.73m2    Calcium 8.7 8.5 - 10.1 MG/DL   CBC WITH AUTOMATED DIFF    Collection Time: 11/16/22  2:38 AM   Result Value Ref Range    WBC 11.7 4.6 - 13.2 K/uL    RBC 2.58 (L) 4.20 - 5.30 M/uL    HGB 8.7 (L) 12.0 - 16.0 g/dL    HCT 26.7 (L) 35.0 - 45.0 %    .5 (H) 78.0 - 100.0 FL    MCH 33.7 24.0 - 34.0 PG    MCHC 32.6 31.0 - 37.0 g/dL    RDW 15.3 (H) 11.6 - 14.5 %    PLATELET 172 108 - 796 K/uL    MPV 9.9 9.2 - 11.8 FL    NRBC 0.2 (H) 0  WBC    ABSOLUTE NRBC 0.02 (H) 0.00 - 0.01 K/uL    NEUTROPHILS 67 40 - 73 %    LYMPHOCYTES 18 (L) 21 - 52 %    MONOCYTES 12 (H) 3 - 10 %    EOSINOPHILS 1 0 - 5 %    BASOPHILS 1 0 - 2 %    IMMATURE GRANULOCYTES 2 (H) 0.0 - 0.5 %    ABS. NEUTROPHILS 7.8 1.8 - 8.0 K/UL    ABS. LYMPHOCYTES 2.0 0.9 - 3.6 K/UL    ABS. MONOCYTES 1.4 (H) 0.05 - 1.2 K/UL    ABS. EOSINOPHILS 0.1 0.0 - 0.4 K/UL    ABS. BASOPHILS 0.1 0.0 - 0.1 K/UL    ABS. IMM. GRANS. 0.2 (H) 0.00 - 0.04 K/UL    DF AUTOMATED     MAGNESIUM    Collection Time: 11/16/22  2:38 AM   Result Value Ref Range    Magnesium 2.5 1.6 - 2.6 mg/dL   PHOSPHORUS    Collection Time: 11/16/22  2:38 AM   Result Value Ref Range    Phosphorus 4.7 2.5 - 4.9 MG/DL   VANCOMYCIN, RANDOM    Collection Time: 11/16/22  2:38 AM   Result Value Ref Range    Vancomycin, random 13.0 5.0 - 40.0 UG/ML   GLUCOSE, POC    Collection Time: 11/16/22  8:30 AM   Result Value Ref Range    Glucose (POC) 119 (H) 70 - 110 mg/dL             Total time to take care of this patient was 35 minutes and more than 50% of time was spent counseling and coordinating care. Disclaimer: Sections of this note are dictated using utilizing voice recognition software, which may have resulted in some phonetic based errors in grammar and contents.  Even though attempts were made to correct all the mistakes, some may have been missed, and remained in the body of the document. If questions arise, please contact our department.

## 2022-11-16 NOTE — HOSPICE
Spoke with Kamila Warner, pt's son and Elisa Renee, his wife via phone   Discussed [facility] philosophy, services, criteria, and IDT. Discussed caregiver need for round the clock care with Kamila Warner and Elisa Renee  primary caregiver identified as Kamila Cortezrio and Elisa Renee  Caregiver concerns identified as: wanting her to make the best decision for her not because she thinks she's a burden to her family     Answered all questions. Kamila Warner and Elisa Renee voiced understanding and would like Ms. Jeff to be provided the same information and assured that she's not a burden and they will support her in anyway. Provided with 24/7 contact information. Thank you for the referral to [facility]. If we can be of further assistance please contact [phone].     Ghanshyam Dunbar RN  Clinical Manager  [address], [address] [place] [address] Str.  [phone]  Email: [email or web address]

## 2022-11-16 NOTE — PROGRESS NOTES
RENAL DAILY PROGRESS NOTE              Subjective:       Complaint: no issue  Overnight events noted  no nausea, vomiting, chest pain, short of breath, cough, seizure. IMPRESSION:   ESRD   Hypotension with symptomatic bradycardia? Vs related to bacteremia  Bacteremia in setting of recent stent exchange(for hydronephrosis) ? contamination. Hx Falls, Pelvic fx  Hx A fib  Chronic hypotension on Midodrine as OP. Currently held due to bradycardia  Secondary hyperparathyroidism  ACD  Hx a fib now with bradycardia ?due to rate controlling agents like metoprolol,digoxin ?pacer consideration  UTI? Diabetes  Chronic nausea,dizziness   PLAN:    C/w dopamine and levophed per ICU team  Spoke with palliative care team,patient and ICU Team. Plan is for cessation of dialysis on discharge. Will optimize volume status while in patient. No need for dialysis today as she appears comfortable and well compensated. Patient understands the implications of stopping dialysis on discharge. She values quality of life more than the quantity and we will honor her wishes in this regard. Explained the life expectancy and other aspects. She understands.   Abx and rest of care per ICU Team  D/w ICU team and palliative care team           Current Facility-Administered Medications   Medication Dose Route Frequency    vancomycin (VANCOCIN) 1250 mg in  ml infusion  1,250 mg IntraVENous ONCE    morphine IR (MS IR) tablet 7.5 mg  7.5 mg Oral DAILY PRN    morphine (ROXANOL) 20 mg/mL concentrated solution 2.6-5 mg  2.6-5 mg Oral Q4H PRN    LORazepam (INTENSOL) 2 mg/mL oral concentrate 0.5 mg  0.5 mg Oral Q4H PRN    DOPamine (INTROPIN) 800 mg in dextrose 5% 500 mL infusion  5-20 mcg/kg/min IntraVENous TITRATE    epoetin caitlin-epbx (RETACRIT) injection 8,000 Units  8,000 Units SubCUTAneous Q TUE, THU & SAT    insulin lispro (HUMALOG) injection   SubCUTAneous AC&HS    glucose chewable tablet 16 g  4 Tablet Oral PRN    glucagon (GLUCAGEN) injection 1 mg  1 mg IntraMUSCular PRN    dextrose 10% infusion 0-250 mL  0-250 mL IntraVENous PRN    0.9% sodium chloride infusion  50 mL/hr IntraVENous CONTINUOUS    aspirin chewable tablet 81 mg  81 mg Oral DAILY    clopidogreL (PLAVIX) tablet 75 mg  75 mg Oral DAILY    [Held by provider] midodrine (PROAMATINE) tablet 10 mg  10 mg Oral TID WITH MEALS    Vancomycin: Pharmacy to dose   Other Rx Dosing/Monitoring    cefepime (MAXIPIME) 1 g in 0.9% sodium chloride (MBP/ADV) 50 mL MBP  1 g IntraVENous Q24H    NOREPINephrine (LEVOPHED) 8 mg in 5% dextrose 250mL (32 mcg/mL) infusion  0.5-50 mcg/min IntraVENous TITRATE    sodium chloride (NS) flush 5-40 mL  5-40 mL IntraVENous Q8H    sodium chloride (NS) flush 5-40 mL  5-40 mL IntraVENous PRN    acetaminophen (TYLENOL) tablet 650 mg  650 mg Oral Q6H PRN    Or    acetaminophen (TYLENOL) suppository 650 mg  650 mg Rectal Q6H PRN    polyethylene glycol (MIRALAX) packet 17 g  17 g Oral DAILY PRN    ondansetron (ZOFRAN ODT) tablet 4 mg  4 mg Oral Q8H PRN    Or    ondansetron (ZOFRAN) injection 4 mg  4 mg IntraVENous Q6H PRN    heparin (porcine) injection 5,000 Units  5,000 Units SubCUTAneous Q8H    DULoxetine (CYMBALTA) capsule 20 mg  20 mg Oral DAILY    famotidine (PF) (PEPCID) 20 mg in 0.9% sodium chloride 10 mL injection  20 mg IntraVENous Q24H    thiamine (B-1) 200 mg in 0.9% sodium chloride 50 mL IVPB  200 mg IntraVENous DAILY    folic acid (FOLVITE) tablet 1 mg  1 mg Oral DAILY       Review of Symptoms: comprehensive ROS negative except above.    Objective:   Patient Vitals for the past 24 hrs:   Temp Pulse Resp BP SpO2   11/16/22 0900 -- 63 16 (!) 138/118 100 %   11/16/22 0845 -- 61 18 (!) 124/97 100 %   11/16/22 0830 -- 64 12 -- 100 %   11/16/22 0815 -- 66 18 (!) 104/50 (!) 88 %   11/16/22 0800 98.2 °F (36.8 °C) 63 15 (!) 91/53 98 %   11/16/22 0745 -- 63 13 (!) 96/47 95 %   11/16/22 0730 -- 63 15 (!) 99/47 92 %   11/16/22 0715 -- 65 16 (!) 97/41 93 %   11/16/22 0700 -- 64 17 (!) 97/41 93 %   11/16/22 0615 -- 63 21 (!) 96/46 95 %   11/16/22 0600 -- 60 25 (!) 108/94 97 %   11/16/22 0545 -- (!) 56 15 (!) 109/47 90 %   11/16/22 0530 -- 65 18 (!) 100/28 99 %   11/16/22 0515 -- (!) 56 15 (!) 108/41 100 %   11/16/22 0500 -- 60 15 (!) 106/49 97 %   11/16/22 0445 -- (!) 59 16 (!) 111/46 98 %   11/16/22 0430 -- 64 15 (!) 115/46 99 %   11/16/22 0415 -- -- -- (!) 122/54 --   11/16/22 0400 98 °F (36.7 °C) 65 16 (!) 124/53 96 %   11/16/22 0345 -- (!) 57 16 (!) 101/52 98 %   11/16/22 0330 -- 63 17 (!) 125/52 97 %   11/16/22 0315 -- 63 17 (!) 123/51 96 %   11/16/22 0300 -- 63 17 (!) 120/52 96 %   11/16/22 0245 -- 66 19 94/63 94 %   11/16/22 0230 -- 69 18 (!) 130/57 93 %   11/16/22 0215 -- 69 19 (!) 128/56 96 %   11/16/22 0200 -- 65 17 (!) 119/58 95 %   11/16/22 0145 -- 65 23 (!) 144/128 98 %   11/16/22 0130 -- 68 16 115/61 99 %   11/16/22 0115 -- 67 14 (!) 180/167 96 %   11/16/22 0100 -- 64 20 (!) 117/103 100 %   11/16/22 0045 -- 68 20 (!) 93/45 100 %   11/16/22 0030 -- 65 15 (!) 107/48 98 %   11/16/22 0015 -- 70 29 (!) 111/54 97 %   11/16/22 0000 97.9 °F (36.6 °C) 67 14 97/61 99 %   11/15/22 2345 -- 69 18 (!) 122/58 (!) 88 %   11/15/22 2330 -- 64 20 (!) 122/59 95 %   11/15/22 2315 -- 63 15 (!) 114/54 96 %   11/15/22 2300 -- 68 15 (!) 122/56 95 %   11/15/22 2245 -- 69 19 109/83 92 %   11/15/22 2230 -- 70 24 91/79 94 %   11/15/22 2215 -- 66 15 120/68 98 %   11/15/22 2200 -- 68 16 (!) 111/52 100 %   11/15/22 2145 -- 67 19 (!) 106/51 100 %   11/15/22 2130 -- 66 16 (!) 115/52 98 %   11/15/22 2115 -- 64 20 94/70 100 %   11/15/22 2100 -- 67 12 (!) 88/73 98 %   11/15/22 2045 -- 63 17 105/77 --   11/15/22 2030 -- 64 23 (!) 92/34 100 %   11/15/22 2015 -- 60 25 99/77 --   11/15/22 2000 97.7 °F (36.5 °C) (!) 59 25 (!) 81/70 100 %   11/15/22 1945 -- (!) 58 16 (!) 109/50 98 %   11/15/22 1930 -- -- -- -- 100 %   11/15/22 1915 -- 61 17 94/64 100 %   11/15/22 1900 -- 61 17 (!) 148/118 98 %   11/15/22 1845 -- Brian Peralta 51 21 (!) 144/103 100 %   11/15/22 1830 -- 63 13 (!) 147/117 100 %   11/15/22 1815 -- (!) 56 15 (!) 110/96 100 %   11/15/22 1800 -- (!) 57 13 (!) 123/114 100 %   11/15/22 1745 -- 63 27 120/85 100 %   11/15/22 1730 -- 62 12 105/75 99 %   11/15/22 1715 -- 61 18 (!) 72/55 95 %   11/15/22 1700 -- (!) 55 14 90/78 100 %   11/15/22 1645 -- (!) 51 17 (!) 86/71 99 %   11/15/22 1630 -- (!) 54 20 (!) 103/46 --   11/15/22 1615 -- (!) 55 13 95/76 100 %   11/15/22 1600 97.6 °F (36.4 °C) (!) 56 23 (!) 116/97 --   11/15/22 1545 -- 62 20 (!) 77/41 90 %   11/15/22 1530 -- (!) 59 18 (!) 114/50 100 %   11/15/22 1515 -- (!) 54 16 96/80 100 %   11/15/22 1500 -- (!) 59 30 114/81 99 %   11/15/22 1445 -- (!) 58 19 (!) 102/91 100 %   11/15/22 1430 -- 62 19 (!) 132/112 99 %   11/15/22 1415 -- (!) 59 20 -- 99 %   11/15/22 1400 -- (!) 58 20 (!) 62/52 (!) 77 %   11/15/22 1345 -- (!) 59 19 99/87 99 %   11/15/22 1330 -- (!) 54 12 (!) 74/36 97 %   11/15/22 1315 -- (!) 55 19 (!) 71/56 99 %   11/15/22 1300 -- 63 20 (!) 88/76 98 %   11/15/22 1245 -- (!) 56 19 (!) 117/92 98 %   11/15/22 1230 -- (!) 58 15 -- 97 %   11/15/22 1215 -- (!) 58 24 -- (!) 83 %   11/15/22 1200 97.4 °F (36.3 °C) 63 17 (!) 89/52 100 %   11/15/22 1145 -- (!) 59 15 -- 100 %        Weight change:      11/14 1901 - 11/16 0700  In: 2288.5 [I.V.:2288.5]  Out: 5 [Urine:5]    Intake/Output Summary (Last 24 hours) at 11/16/2022 1133  Last data filed at 11/16/2022 0800  Gross per 24 hour   Intake 2089.27 ml   Output 5 ml   Net 2084.27 ml     Physical Exam:   General: comfortable, no acute distress   HEENT sclera anicteric, supple neck, no thyromegaly  CVS: S1S2 heard,  no rub  RS: + air entry b/l,   Abd: Soft, Non tender, Not distended, Positive bowel sounds, no organomegaly, no CVA / supra pubic tenderness  Neuro: non focal, awake, alert , CN II-XII are grossly intact  Extrm: no edema, no cyanosis, clubbing   Skin: no visible  Rash  Musculoskeletal: No gross joints or bone deformities Data Review:     LABS:   Hematology:   Recent Labs     11/16/22  0238 11/15/22  0400 11/14/22  1330   WBC 11.7 16.4* 8.9   HGB 8.7* 9.5* 10.4*   HCT 26.7* 29.3* 32.3*     Chemistry:   Recent Labs     11/16/22  0238 11/15/22  0400 11/14/22 2026 11/14/22  1330   BUN 23* 20* 17 15   CREA 7.47* 6.22* 5.79* 5.42*   CA 8.7 8.4* 8.5 9.1   ALB  --   --   --  3.3*   K 3.7 3.6 5.2 3.8    136 137 139    101 102 101   CO2 27 26 27 28   PHOS 4.7 4.0  --   --    * 170* 109* 147*            Procedures/imaging: see electronic medical records for all procedures, Xrays and details which were not copied into this note but were reviewed prior to creation of Plan          Assessment & Plan:       See above      Gricelda Ybarra MD  11/16/2022  11:33 AM

## 2022-11-16 NOTE — ACP (ADVANCE CARE PLANNING)
Advance Care Planning     Advanced Steps [facility] (Physician Orders for Scope of Treatment)       Date of conversation: 11/16/2022   Location: [facility]  Length (minutes): 40    Participants:   [x] Patient     Advanced Steps® ACP Facilitator: Hanny Graham RN      Conversation Topics   (If Patient does not have decision making capacity, Agent/Surrogate responds based on understanding of how patient would respond if capable)    Understanding of Medical Condition/s AND Potential Complications:    Patient response: Palliative Medicine team Dr. Kajal Mayer MD and Tess Allison RN met at pt's bedside. Pt sitting up in bed, alert and oriented x 4. Pt states that she is \"tired\" and is ready to transition to hospice at discharge. She states that she has no quality of life anymore and wants to stop dialysis treatments. Pt understands what it means when one stops dialysis. She understands that her time here may be short. She is accepting of this. She is agreeable to continue current medical management and dialysis while hospitalized, but wants to transition to comfort at discharge and stop outpatient dialysis. Telephone call made to pt's son. He is in agreement with the above plan to transition to comfort with support of hospice at discharge. Pt agreeable to complete a POST form. POST form denotes DDNR status, comfort measures (to start at discharge) and NO feeding tubes. Pt signed the POST. Copies were provided to her.      LNeeds to discuss with spiritual/Pentecostalism advisor: [] Yes  [x] No    Needs more information about illness and complications:  [] Yes  [x] No      Cardiopulmonary Resuscitation        Order Elected for CPR:  []  Attempt Resuscitation [x]  Do Not Attempt Resuscitation      When NOT in Cardiopulmonary Arrest, Order Elected:      [x] Comfort Measures - to start at discharge  [] Limited Additional Interventions  [] Full Interventions    Artificially Administered Nutrition, Order Elected:    [x] No Feeding Tube   [] Feeding Tube for a defined trial period  [] Feeding Tube long-term if indicated    The following was provided (check all that apply):      Healthcare Decision Information Cards:   [] Help with Breathing Facts   [] Tube Feeding Facts   [] CPR Facts      [x] Review of existing Advance Directive  [] Assistance with Completion of New Advance Directive   [x] Review of [place] POST Form       Meeting Outcomes:   [] ACP discussion completed   [x] [facility] completed  [x] [place] prepared for Provider review and signature   [x] Original placed on Chart, if in facility (form to be sent with patient at discharge)  [x] Copy given to healthcare agent    [] Copy scanned to electronic medical record       Follow-up plan:     [] Schedule follow-up conversation to continue planning   [x] Referred individual to Provider for additional questions/concerns   [x] Advised patient/agent/surrogate to review completed POST form and update if needed with changes in condition, patient preferences or care setting     [] This note routed to one or more involved healthcare providers     Palliative Medicine    Pt remains DNR/DNI. Transition to comfort measures at discharge. Pt wishes to go home with the support of hospice. Will stop dialysis on outpatient basis. Ok to continue with current medical management while inpatient. Do not escalate care. POST form completed today. Thank you for the Palliative Medicine consult and allowing us to participate in the care of Mrs. Ray Alcaraz. Will continue to monitor and provide support.     Cindy La RN, BSN  Palliative Medicine Inpatient RN   [facility]  Palliative COPE Line: [phone]

## 2022-11-16 NOTE — PROGRESS NOTES
[address] [facility] Pharmacokinetic Monitoring Service - Vancomycin    Consulting Provider: Ora Méndez MD   Indication: Sepsis of Unknown Etiology  Target Concentration: Dosing based on anticipated concentration <15 mg/L due to renal impairment/insufficiency  Day of Therapy: 3  Additional Antimicrobials: Cefepime    Pertinent Laboratory Values: Wt Readings from Last 1 Encounters:   11/14/22 85 kg (187 lb 6.3 oz)     Temp Readings from Last 1 Encounters:   11/16/22 97.9 °F (36.6 °C)     Recent Labs     11/16/22  0238 11/15/22  0400 11/14/22 2026 11/14/22  1330   CREA 7.47* 6.22* 5.79* 5.42*   BUN 23* 20* 17 15     Estimated Creatinine Clearance: 6.2 mL/min (A) (based on SCr of 7.47 mg/dL (H)). Recent Labs     11/16/22  0238 11/15/22  0400   WBC 11.7 16.4*       Pertinent Cultures:  Culture Date Source Results   11/14 Blood AEROBIC BOTTLE GRAM POSITIVE COCCI IN GROUPS   11/15 Urine - Cath Pending   MRSA Nasal Swab: N/A. Non-respiratory infection. .      Assessment:    Date/Time Current Dose Concentration Timing of Concentration (h) AUC   11/14 2116 2000 mg x 1 - - -   11/16 3994 - 13 29    Note: Serum concentrations collected for AUC dosing may appear elevated if collected in close proximity to the dose administered, this is not necessarily an indication of toxicity    Plan:  Give Vancomycin 1250 mg IV x 1  Repeat vancomycin concentration ordered for 11/18 @ 0400.  Will re-dose when level below 15 mcg/ml   Pharmacy will continue to monitor patient and adjust therapy as indicated    Thank you for the consult,  Keyanna Bennett, [address]  11/16/2022 3:48 AM

## 2022-11-16 NOTE — DIABETES MGMT
Glycemic Control:  Pt with  history of Diabetes. Pt's blood glucose readings are in  the target range. She required 6 units corrective lispro insulin in 24 hours. Recent Glucose Results:   Lab Results   Component Value Date/Time     (H) 11/16/2022 02:38 AM    GLUCPOC 119 (H) 11/16/2022 08:30 AM    GLUCPOC 150 (H) 11/15/2022 09:52 PM    GLUCPOC 155 (H) 11/15/2022 04:01 PM     Lab Results   Component Value Date/Time    Hemoglobin A1c <3.8 (L) 11/15/2022 04:00 AM     Continue to monitor for glycemic control.   Colt Gaffney MS RD [facility] BC-ADM

## 2022-11-17 NOTE — PROGRESS NOTES
Problem: Falls - Risk of  Goal: *Absence of Falls  Description: Document Alon Valle Fall Risk and appropriate interventions in the flowsheet.   Outcome: Progressing Towards Goal  Note: Fall Risk Interventions:       Mentation Interventions: Adequate sleep, hydration, pain control, Bed/chair exit alarm, Evaluate medications/consider consulting pharmacy    Medication Interventions: Bed/chair exit alarm, Evaluate medications/consider consulting pharmacy    Elimination Interventions: Bed/chair exit alarm, Call light in reach, Toileting schedule/hourly rounds    History of Falls Interventions: Bed/chair exit alarm, Evaluate medications/consider consulting pharmacy         Problem: Pain  Goal: *Control of Pain  Outcome: Progressing Towards Goal     Problem: Delirium Treatment  Goal: *Absence of falls      Paddy Meng RN  Outcome: Progressing Towards Goal

## 2022-11-17 NOTE — PROGRESS NOTES
RENAL DAILY PROGRESS NOTE              Subjective:       Complaint: mild icu delirium noted  Overnight events noted  no nausea, vomiting, chest pain, short of breath, cough, seizure. IMPRESSION:   ESRD   Hypotension with symptomatic bradycardia? Vs related to bacteremia  Bacteremia in setting of recent stent exchange(for hydronephrosis) ? contamination. Hx Falls, Pelvic fx  Hx A fib  Chronic hypotension on Midodrine as OP. Currently held due to bradycardia  Secondary hyperparathyroidism  ACD  Hx a fib now with bradycardia ?due to rate controlling agents like metoprolol,digoxin ?pacer consideration  UTI?   Diabetes  Chronic nausea,dizziness   PLAN:    C/w dopamine and levophed per ICU team  No indication for dialysis today  Abx and rest of care per ICU Team  D/w ICU            Current Facility-Administered Medications   Medication Dose Route Frequency    morphine IR (MS IR) tablet 7.5 mg  7.5 mg Oral DAILY PRN    morphine (ROXANOL) 20 mg/mL concentrated solution 2.6-5 mg  2.6-5 mg Oral Q4H PRN    LORazepam (INTENSOL) 2 mg/mL oral concentrate 0.5 mg  0.5 mg Oral Q4H PRN    DOPamine (INTROPIN) 800 mg in dextrose 5% 500 mL infusion  5-20 mcg/kg/min IntraVENous TITRATE    epoetin caitlin-epbx (RETACRIT) injection 8,000 Units  8,000 Units SubCUTAneous Q TUE, THU & SAT    insulin lispro (HUMALOG) injection   SubCUTAneous AC&HS    glucose chewable tablet 16 g  4 Tablet Oral PRN    glucagon (GLUCAGEN) injection 1 mg  1 mg IntraMUSCular PRN    dextrose 10% infusion 0-250 mL  0-250 mL IntraVENous PRN    aspirin chewable tablet 81 mg  81 mg Oral DAILY    clopidogreL (PLAVIX) tablet 75 mg  75 mg Oral DAILY    [Held by provider] midodrine (PROAMATINE) tablet 10 mg  10 mg Oral TID WITH MEALS    Vancomycin: Pharmacy to dose   Other Rx Dosing/Monitoring    cefepime (MAXIPIME) 1 g in 0.9% sodium chloride (MBP/ADV) 50 mL MBP  1 g IntraVENous Q24H    NOREPINephrine (LEVOPHED) 8 mg in 5% dextrose 250mL (32 mcg/mL) infusion 0.5-50 mcg/min IntraVENous TITRATE    sodium chloride (NS) flush 5-40 mL  5-40 mL IntraVENous Q8H    sodium chloride (NS) flush 5-40 mL  5-40 mL IntraVENous PRN    acetaminophen (TYLENOL) tablet 650 mg  650 mg Oral Q6H PRN    Or    acetaminophen (TYLENOL) suppository 650 mg  650 mg Rectal Q6H PRN    polyethylene glycol (MIRALAX) packet 17 g  17 g Oral DAILY PRN    ondansetron (ZOFRAN ODT) tablet 4 mg  4 mg Oral Q8H PRN    Or    ondansetron (ZOFRAN) injection 4 mg  4 mg IntraVENous Q6H PRN    heparin (porcine) injection 5,000 Units  5,000 Units SubCUTAneous Q8H    DULoxetine (CYMBALTA) capsule 20 mg  20 mg Oral DAILY    famotidine (PF) (PEPCID) 20 mg in 0.9% sodium chloride 10 mL injection  20 mg IntraVENous Q24H    thiamine (B-1) 200 mg in 0.9% sodium chloride 50 mL IVPB  200 mg IntraVENous DAILY    folic acid (FOLVITE) tablet 1 mg  1 mg Oral DAILY       Review of Symptoms: comprehensive ROS negative except above.    Objective:   Patient Vitals for the past 24 hrs:   Temp Pulse Resp BP SpO2   11/17/22 0400 -- 76 25 (!) 97/35 93 %   11/17/22 0300 -- 71 21 (!) 132/31 96 %   11/17/22 0200 -- 66 17 (!) 125/40 96 %   11/17/22 0100 -- 69 24 (!) 116/28 97 %   11/17/22 0000 98.5 °F (36.9 °C) 66 23 (!) 124/45 95 %   11/16/22 2300 -- 63 26 (!) 93/45 95 %   11/16/22 2200 -- 62 15 (!) 93/55 90 %   11/16/22 2100 -- 63 20 (!) 107/56 (!) 68 %   11/16/22 2000 98.6 °F (37 °C) 64 15 (!) 128/97 (!) 68 %   11/16/22 1900 -- 61 22 (!) 86/74 (!) 65 %   11/16/22 1845 -- 62 13 (!) 117/99 --   11/16/22 1830 -- 63 20 102/62 (!) 69 %   11/16/22 1815 -- 64 22 (!) 114/51 (!) 69 %   11/16/22 1800 -- 65 18 (!) 112/56 (!) 74 %   11/16/22 1745 -- 66 17 (!) 120/54 (!) 69 %   11/16/22 1730 -- 65 25 102/87 --   11/16/22 1715 -- 68 20 -- 100 %   11/16/22 1700 -- 66 16 (!) 118/32 93 %   11/16/22 1645 -- 63 21 (!) 109/55 (!) 89 %   11/16/22 1630 -- 61 9 (!) 117/44 (!) 64 %   11/16/22 1615 -- 63 14 95/77 98 %   11/16/22 1600 -- 62 18 (!) 106/43 (!) 88 % 11/16/22 1545 -- 63 21 (!) 104/57 96 %   11/16/22 1530 -- 65 13 (!) 74/62 96 %   11/16/22 1515 -- 62 20 (!) 79/40 98 %   11/16/22 1500 -- 61 17 (!) 77/54 (!) 82 %   11/16/22 1445 -- 63 13 (!) 54/44 93 %   11/16/22 1430 -- 67 22 (!) 111/51 (!) 84 %   11/16/22 1415 -- (!) 59 21 (!) 96/56 (!) 68 %   11/16/22 1400 -- 67 22 (!) 106/34 (!) 89 %   11/16/22 1345 -- 64 19 (!) 112/40 91 %   11/16/22 1330 -- 64 20 (!) 151/139 96 %   11/16/22 1315 -- 64 30 (!) 128/100 99 %   11/16/22 1300 -- (!) 59 13 (!) 98/44 100 %   11/16/22 1245 -- (!) 57 26 (!) 84/48 95 %   11/16/22 1230 -- 60 23 (!) 88/71 (!) 85 %   11/16/22 1215 -- 60 15 -- 95 %   11/16/22 1200 98.5 °F (36.9 °C) 63 21 -- 93 %   11/16/22 1145 -- 61 16 90/61 (!) 67 %   11/16/22 1130 -- -- (!) 32 (!) 76/46 --   11/16/22 1115 -- (!) 52 16 (!) 72/51 99 %   11/16/22 1100 -- (!) 52 18 (!) 84/44 98 %   11/16/22 1045 -- (!) 52 16 -- (!) 86 %   11/16/22 1030 -- (!) 53 13 (!) 139/93 (!) 82 %   11/16/22 1015 -- -- -- (!) 155/143 --   11/16/22 1000 -- (!) 51 16 94/81 99 %   11/16/22 0945 -- (!) 55 18 -- (!) 80 %   11/16/22 0930 -- 60 20 115/80 100 %   11/16/22 0915 -- 61 16 (!) 129/108 100 %   11/16/22 0900 -- 63 16 (!) 138/118 100 %   11/16/22 0845 -- 61 18 (!) 124/97 100 %   11/16/22 0830 -- 64 12 -- 100 %   11/16/22 0815 -- 66 18 (!) 104/50 (!) 88 %   11/16/22 0800 98.2 °F (36.8 °C) 63 15 (!) 91/53 98 %   11/16/22 0745 -- 63 13 (!) 96/47 95 %   11/16/22 0730 -- 63 15 (!) 99/47 92 %   11/16/22 0715 -- 65 16 (!) 97/41 93 %   11/16/22 0700 -- 64 17 (!) 97/41 93 %          Weight change:      11/15 0701 - 11/16 1900  In: 2555.8 [I.V.:2555.8]  Out: 5 [Urine:5]    Intake/Output Summary (Last 24 hours) at 11/17/2022 0628  Last data filed at 11/16/2022 1900  Gross per 24 hour   Intake 479.54 ml   Output 0 ml   Net 479.54 ml       Physical Exam:   General: comfortable, no acute distress   HEENT sclera anicteric, supple neck, no thyromegaly  CVS: S1S2 heard,  no rub  RS: + air entry b/l, Abd: Soft, Non tender, Not distended, Positive bowel sounds, no organomegaly, no CVA / supra pubic tenderness  Neuro: non focal, awake, alert , CN II-XII are grossly intact  Extrm: no edema, no cyanosis, clubbing   Skin: no visible  Rash  Musculoskeletal: No gross joints or bone deformities         Data Review:     LABS:   Hematology:   Recent Labs     11/16/22 0238 11/15/22  0400 11/14/22  1330   WBC 11.7 16.4* 8.9   HGB 8.7* 9.5* 10.4*   HCT 26.7* 29.3* 32.3*       Chemistry:   Recent Labs     11/16/22  0238 11/15/22  0400 11/14/22 2026 11/14/22  1330   BUN 23* 20* 17 15   CREA 7.47* 6.22* 5.79* 5.42*   CA 8.7 8.4* 8.5 9.1   ALB  --   --   --  3.3*   K 3.7 3.6 5.2 3.8    136 137 139    101 102 101   CO2 27 26 27 28   PHOS 4.7 4.0  --   --    * 170* 109* 147*              Procedures/imaging: see electronic medical records for all procedures, Xrays and details which were not copied into this note but were reviewed prior to creation of Plan          Assessment & Plan:       See above      Linda Fraser MD  11/17/2022

## 2022-11-17 NOTE — PROGRESS NOTES
Palliative Medicine    Visited patient along with Palliative team member Dr. Joli Sicard. Patient resting quietly in bed, awake, alert and oriented X 3. She tells our team \"I was confused last night\", \"they gave me some medication\". Reports her son was in early this morning because \"I was wild\". Is able to recognize us from our previous visits. Denies any pain or discomfort at this time. Continues to have concerns about all the medicine she is receiving, understands that she will become more hypotensive and bradycardic once meds are stopped. She states that this is too much to think about right now. She is sure that she wants to stop dialysis, stating, \"it is too much\". She is agreeable to go home with the support of hospice when cleared for discharge. Is undecided about transitioning to comfort measures in the hospital. Wants to take another day and talk with her son. Palliative team remains available to provide support to Ms. Stern and her family during this hospital stay.     CODE STATUS: DNR/DNI, LIMITED INTERVENTIONS, NO FEEDING TUBE    Slim Malik RN  Palliative Medicine Inpatient RN  Palliative COPE Line: [phone]

## 2022-11-17 NOTE — PROGRESS NOTES
Cardiology Progress Note    Admit Date: 11/14/2022  Attending Cardiologist: Dr. Magdy Yin   Patient seen and examined independently. Apparently, the patient was quite confused overnight. Patient appears to have at least some element of tachybradycardia syndrome and fragile hemodynamics. Would normally recommend pacemaker in the setting although my understanding is the patient is not interested in continuing with hemodialysis. If this is the case, this would obviously not be indicated. Agree with assessment and plan as noted below. Emma Caballero MD    Assessment:     Hospital Problems  Date Reviewed: 11/14/2022            Codes Class Noted POA    Bradycardia ICD-10-CM: R00.1  ICD-9-CM: 427.89  11/14/2022 Unknown        Hypotension ICD-10-CM: I95.9  ICD-9-CM: 458.9  11/14/2022 Unknown           -A/c hypotension, on Midodrine as outpatient. Multifactorial in setting of below. -Bradycardia, on 12.5 mg lopressor BID and digoxin EOD. HR in the low 40s on prior ECGs and hospitalizations over the last year. Suspect underlying SSS. -Chronic N/V/D, possible gastroparesis.   -UTI. -Chronic dizziness, at baseline.   -Chest pain, MI r/o with serial negative cardiac biomarkers. S/p LHC 05/2022 without evidence of obstructive CAD. Moderate disease noted in distal RCA. -Paroxysmal Afib. s/p [employer]man GOLD device 6/27/22  -Hydronephrosis with right ureteral stent, s/p exchange 11/8/22. -HLD, not on statin d/t intolerance. -DM with neuropathy. -ESRD on HD, M/W/F  -Anemia of chronic disease.   -Chronic hypotension, on Midodrine. -H/o hematuria. -Hypothyroidism on synthroid  -Poor functional status         Primary cardiologist is Dr. Yves Alvares.    Plan:     Ongoing discussions regarding goals of care. Noted confusion overnight. Patient previously declining PPM. Ongoing discussions regarding goals of care. Continue to wean pressors as tolerated. Volume mgmt per nephrology. Subjective:     No new complaints. Reports confusion overnight.  A&Ox3 this am.     Objective:      Patient Vitals for the past 8 hrs:   Temp Pulse Resp BP SpO2   11/17/22 0900 -- 79 18 (!) 118/47 --   11/17/22 0845 -- 79 21 -- --   11/17/22 0830 -- 75 24 110/68 95 %   11/17/22 0815 -- 76 20 -- (!) 89 %   11/17/22 0800 98.3 °F (36.8 °C) 73 17 (!) 86/61 97 %   11/17/22 0745 -- 72 18 -- 100 %   11/17/22 0730 -- 79 19 (!) 121/31 (!) 80 %   11/17/22 0715 -- 70 21 (!) 100/35 93 %   11/17/22 0700 -- 67 22 (!) 80/72 94 %   11/17/22 0600 -- 72 29 (!) 125/90 97 %   11/17/22 0530 -- 74 15 (!) 110/46 98 %   11/17/22 0400 98.3 °F (36.8 °C) 76 25 (!) 97/35 93 %         Patient Vitals for the past 96 hrs:   Weight   11/14/22 1507 85 kg (187 lb 6.3 oz)       TELE: SR               Current Facility-Administered Medications   Medication Dose Route Frequency Last Admin    morphine IR (MS IR) tablet 7.5 mg  7.5 mg Oral DAILY PRN 7.5 mg at 11/17/22 0534    morphine (ROXANOL) 20 mg/mL concentrated solution 2.6-5 mg  2.6-5 mg Oral Q4H PRN      LORazepam (INTENSOL) 2 mg/mL oral concentrate 0.5 mg  0.5 mg Oral Q4H PRN 0.5 mg at 11/17/22 0535    DOPamine (INTROPIN) 800 mg in dextrose 5% 500 mL infusion  5-20 mcg/kg/min IntraVENous TITRATE 7 mcg/kg/min at 11/17/22 0147    epoetin caitlin-epbx (RETACRIT) injection 8,000 Units  8,000 Units SubCUTAneous Q TUE, THU & SAT 8,000 Units at 11/15/22 2036    insulin lispro (HUMALOG) injection   SubCUTAneous AC&HS 2 Units at 11/17/22 0829    glucose chewable tablet 16 g  4 Tablet Oral PRN      glucagon (GLUCAGEN) injection 1 mg  1 mg IntraMUSCular PRN      dextrose 10% infusion 0-250 mL  0-250 mL IntraVENous PRN      aspirin chewable tablet 81 mg  81 mg Oral DAILY 81 mg at 11/17/22 0824    clopidogreL (PLAVIX) tablet 75 mg  75 mg Oral DAILY 75 mg at 11/17/22 0824    [Held by provider] midodrine (PROAMATINE) tablet 10 mg  10 mg Oral TID WITH MEALS 10 mg at 11/15/22 0844    cefepime (MAXIPIME) 1 g in 0.9% sodium chloride (MBP/ADV) 50 mL MBP 1 g IntraVENous Q24H 1 g at 11/16/22 2201    NOREPINephrine (LEVOPHED) 8 mg in 5% dextrose 250mL (32 mcg/mL) infusion  0.5-50 mcg/min IntraVENous TITRATE 15 mcg/min at 11/17/22 0853    sodium chloride (NS) flush 5-40 mL  5-40 mL IntraVENous Q8H 10 mL at 11/17/22 0537    sodium chloride (NS) flush 5-40 mL  5-40 mL IntraVENous PRN      acetaminophen (TYLENOL) tablet 650 mg  650 mg Oral Q6H  mg at 11/16/22 2201    Or    acetaminophen (TYLENOL) suppository 650 mg  650 mg Rectal Q6H PRN      polyethylene glycol (MIRALAX) packet 17 g  17 g Oral DAILY PRN      ondansetron (ZOFRAN ODT) tablet 4 mg  4 mg Oral Q8H PRN      Or    ondansetron (ZOFRAN) injection 4 mg  4 mg IntraVENous Q6H PRN 4 mg at 11/15/22 2335    heparin (porcine) injection 5,000 Units  5,000 Units SubCUTAneous Q8H 5,000 Units at 11/17/22 0534    DULoxetine (CYMBALTA) capsule 20 mg  20 mg Oral DAILY 20 mg at 11/17/22 0824    famotidine (PF) (PEPCID) 20 mg in 0.9% sodium chloride 10 mL injection  20 mg IntraVENous Q24H 20 mg at 11/17/22 0824    thiamine (B-1) 200 mg in 0.9% sodium chloride 50 mL IVPB  200 mg IntraVENous DAILY 200 mg at 95/82/61 4466    folic acid (FOLVITE) tablet 1 mg  1 mg Oral DAILY 1 mg at 11/17/22 4087         Intake/Output Summary (Last 24 hours) at 11/17/2022 1137  Last data filed at 11/17/2022 0800  Gross per 24 hour   Intake 978.08 ml   Output --   Net 978.08 ml       Physical Exam:  General:  alert, cooperative, no distress, appears stated age  Neck:  no JVD  Lungs:  clear to auscultation bilaterally  Heart:  regular rate and rhythm, S1, S2 normal, no murmur, click, rub or gallop  Abdomen:  abdomen is soft without significant tenderness, masses, organomegaly or guarding  Extremities:  extremities normal, atraumatic, no cyanosis or edema    Visit Vitals  BP (!) 118/47   Pulse 79   Temp 98.3 °F (36.8 °C)   Resp 18   Ht 5' 2\" (1.575 m)   Wt 85 kg (187 lb 6.3 oz)   SpO2 95%   BMI 34.27 kg/m²       Data Review:     Labs: Results: Chemistry Recent Labs     11/17/22  0630 11/16/22  0238 11/15/22  1700 11/15/22  0400 11/14/22 2026 11/14/22  1330   * 152*  --  170*   < > 147*   * 136  --  136   < > 139   K 3.9 3.7  --  3.6   < > 3.8    100  --  101   < > 101   CO2 23 27  --  26   < > 28   BUN 27* 23*  --  20*   < > 15   CREA 8.52* 7.47*  --  6.22*   < > 5.42*   CA 8.3* 8.7  --  8.4*   < > 9.1   MG 2.2 2.5 2.4 1.8  --  1.8   PHOS 4.9 4.7  --  4.0  --   --    AGAP 10 9  --  9   < > 10   BUCR 3* 3*  --  3*   < > 3*   AP  --   --   --   --   --  107   TP  --   --   --   --   --  6.8   ALB  --   --   --   --   --  3.3*   GLOB  --   --   --   --   --  3.5   AGRAT  --   --   --   --   --  0.9    < > = values in this interval not displayed.       CBC w/Diff Recent Labs     11/17/22  0630 11/16/22  0238 11/15/22  0400   WBC 11.4 11.7 16.4*   RBC 2.34* 2.58* 2.84*   HGB 7.9* 8.7* 9.5*   HCT 24.2* 26.7* 29.3*    275 309   GRANS 77* 67 65   LYMPH 11* 18* 21   EOS 2 1 1      Cardiac Enzymes No results found for: CPK, CK, CKMMB, CKMB, RCK3, CKMBT, CKNDX, CKND1, PATTI, TROPT, TROIQ, GRAHAM, TROPT, TNIPOC, BNP, BNPP   Coagulation Recent Labs     11/14/22  1330   PTP 13.9   INR 1.0       Lipid Panel No results found for: CHOL, CHOLPOCT, CHOLX, CHLST, CHOLV, 910750, HDL, HDLP, LDL, LDLC, DLDLP, 317474, VLDLC, VLDL, TGLX, TRIGL, TRIGP, TGLPOCT, CHHD, CHHDX   BNP No results found for: BNP, BNPP, XBNPT   Liver Enzymes Recent Labs     11/14/22  1330   TP 6.8   ALB 3.3*         Thyroid Studies Lab Results   Component Value Date/Time    TSH 3.14 11/14/2022 01:30 PM          Signed By: Aria Segura PA-C     November 17, 2022

## 2022-11-17 NOTE — PROGRESS NOTES
[facility] [address]  Admission History and Physical      Patient:    Dana Madsen      76 y.o. female            MRN:       [id number]                                                                                    Admission Date:         11/14/2022  Code status:                DNR    Dana Madsen is a 76y.o. year old female admitted for Hypotension [I95.9]  Bradycardia [R00.1]. ASSESSMENT AND PLAN  Problem List Items Addressed This Visit          Circulatory    Chronic hypotension (Chronic)    Hypotension       Urinary    ESRD on dialysis ([facility] [address]       Other    Presence of Watchman left atrial appendage closure device    Bradycardia     Other Visit Diagnoses       Symptomatic bradycardia    -  Primary    ESRD (end stage renal disease) on dialysis ([facility] [address]        Shock ([facility] [address]              Dana Madsen is a 76 y.o.  female with PMHx of chronic hypotension, ESRD on HD, R hydronephrosis w/ stent, a-fib, T2DM w/ neuropathy (controlled), hypothyroidism, and macrocytic anemia who is currently admitted to ICU for management of chronic hypotension and symptomatic bradycardia requiring vasopressor therapy. UA strongly suspicious for UTI, likely urosepsis. Acute on Chronic Hypotension  - Continues to require vasporessors (dopamine and levophed) d/t persistent hypotension; SBP improved to 100s and 90s, but DBP in 20s-50s overall  Plan:  - Continue dopamine and norepinephrine drips; titrate vasopressors w/ goal of SBP >90  - Holding antihypertensives/antiarrhythmics   - Holding midodrine  - Encourage hydration    Hx A-Fib, Now with Symptomatic Bradycardia  - Presence of implanted watchman device, on plavix and aspirin (watchman in place as of august 2022)  - TSH wnl. Digoxin level 1.6.  Goal to \"wash\" all of digoxin out of system  - Cardiology potentially considering pacemaker but patient and her son have declined   - Outpatient metoprolol and digoxin have been discontinued per cardio   Plan:  - Continue dopamine, cardiology following  - Continuous cardiac monitoring  - Holding antihypertensives/antiarrhythmics   - 81 mg ASA and plavix 75 mg daily for watchman device    Urosepsis  - UA returned and was highly suggestive of UTI (+for bacteria, leuk est, nitrites, WBC); urine culture shows no growth   - Blood cultures collected 11/14:  1 of 2 samples grew gram+ cocci in clusters. Likely contaminated sample. - CXR not significant for acute cardiopulm process  - CT chest/abd/pelvis negative for acute pathology  - WBC decreasing; 8.9 > 16.4 > 11.7 > 11.4 w/ neutrophils 77  - LA normalized, 2.04 > 1.98. No longer trending. Plan:  - Continue NS @ 50 cc/hr  - Continue cefepime and vancomycin, to be dosed by pharmacy  - IV famotidine 20 mg daily to prevent stress ulcer  - Daily CBC, monitor for fever  - Following blood cultures    ESRD on HD, Hx hydronephrosis  - Typically MWF HD, last dialyzed earlier on 11/14 for about 3 hrs but felt weak and stopped, after which line fell out of her arm.  - Chronic macrocytic anemia; Hb 8.7>7.9 this AM   - Renal fxn continuing to worsen; Cr 8.52 with GFR 4 today  - Pt has decided to cease dialysis treatments in pursuit of comfort care and limited interventions. If she changes her mind, may need dialysis cath and to switch to CRRT. Plan:  - Nephrology is following. Appreciate their recommendations as pt transitions away from dialysis. - Monitor I/O   - Continue Retacrit Tues, Thurs, Sat  - Continue folate and thiamine  - Daily BMP, magnesium, and phos    N9CO complicated by diabetic neuropathy and gastroparesis  - Stable; last A1c 4.9.  Glucose 184 overnight.  - A1c < 3.8 on 11/15  Plan:  - Correctional insulin, POC glucose checks q6hrs  - Cymbalta for neuropathy   - Morphine IR 7.5 mg PO q6hr  - PRN tylenol for pain  - PRN Zofran for nausea 2/2 gastroparesis     Debility and Functional Weakness  - Pt w/ chronic diseases, quite debilitating and distressing to pt, including autonomic neuropathy, syncopal episodes, ESRD, chronic hypotension, and chronic nausea and vomiting. Feels weak and tired. - Pt has spoken with her son and agree on hospice care  Plan:  - Palliative care to continue to follow   - Prioritize comfort  - Will pursue hospice care on discharge    Hospital Delirium vs. Anxiety & Depression  - Experiencing some situational depression and dysphoria d/t her declining health, working with palliative care on goals of care. - Periodic states of hallucination and/or confusion   - Alert and oriented to self, date, location  Plan:  - Continue cymbalta 30 mg daily     Code: DNR  Diet: Adult regular  DVT/AC: SQH 5000 units Q8hr  Mobility: per protocol  Disposition: Critical care, ICU    Point of Contact (relationship): [employer], Son: [phone]    SUBJECTIVE:    Patient seen at bedside this AM.States that she was confused this AM when her son came to visit. Believed it was 5 pm when it was 5 am. She denies any pain or acute concerns right now. PMHx, PSHx, SHx, FHx, MEDS, ALLERGIES:   Past Medical History:   Diagnosis Date    Anemia in end-stage renal disease ([facility] [address]     Anticoagulated by anticoagulation treatment     On Apixaban    Chronic hypotension     On Midodrine    Chronic kidney disease     ESRD on HD MWF    Chronic pain     Closed fracture of left inferior pubic ramus, with routine healing, subsequent encounter 11/12/2021    Closed fracture of superior pubic ramus, left, with routine healing, subsequent encounter 11/12/2021    Closed nondisplaced fracture of anterior wall of left acetabulum with routine healing 11/12/2021    Depression     End-stage renal disease on hemodialysis ([facility] [address]     HD at [facility] on MWF.  Tel # [phone]    Gastroesophageal reflux disease     Glaucoma     History of acute pyelonephritis 02/20/2020    History of hydronephrosis 10/05/2021    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    Urine culture (collected 10/8/2021, resulted 10/14/2021) yielded growth of >100,000 colonies/ml of Enterococcus faecalis RESISTANT to Ciprofloxacin, Levofloxacin, Tetracycline and Vancomycin    History of kidney stones     History of recurrent urinary tract infection     History of sepsis 06/18/2021    History of septic shock 10/08/2021    History of urethral stricture     History of urinary tract infection 10/08/2021    Urine culture (collected 10/8/2021, resulted 10/14/2021) yielded growth of >100,000 colonies/ml of Enterococcus faecalis RESISTANT to Ciprofloxacin, Levofloxacin, Tetracycline and Vancomycin    Hyperlipidemia     Hyperphosphatemia 11/14/2021    Hypothyroidism     Lung mass     Mononeuropathy     Involving ring finger of left hand    Need for prophylactic isolation 10/08/2021    Urine culture (collected 10/8/2021, resulted 10/14/2021) yielded growth of >100,000 colonies/ml of Enterococcus faecalis RESISTANT to Ciprofloxacin, Levofloxacin, Tetracycline and Vancomycin    Osteoporosis     Paroxysmal atrial fibrillation (HCC)     Secondary hyperparathyroidism of renal origin ([facility] [address]     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 ([facility] [address]     HbA1c (10/8/2021) = 4.6    Uric acid nephrolithiasis     Urinary incontinence       Past Surgical History:   Procedure Laterality Date    HX APPENDECTOMY      HX CHOLECYSTECTOMY      HX GASTRIC BYPASS      Gastric stapling    HX KNEE ARTHROSCOPY      HX UROLOGICAL      right PCN placement    HX UROLOGICAL  07/23/2018    RIGHT URETEROSCOPY WITH HOLMIUM LASER    IR EXCHANGE NEPHRO PERC LT SI  2/21/2020    IR EXCHANGE NEPHRO PERC RT SI  4/13/2020    IR EXCHANGE NEPHRO PERC RT SI  7/17/2020    IR NEPHROSTOMY PERC RT PLC CATH  SI  10/14/2020    IR NEPHROURETERAL PERC RT PLC CATH NEW ACCESS  SI  4/30/2020    [facility] INTRO CATH DIALYSIS CIRCUIT DX ANGRPH FLUOR S&I Left 9/24/2020    FISTULOGRAM LEFT/poss permanent catheter placement performed by Donna Jay MD at [facility] CATH LAB    VASCULAR SURGERY PROCEDURE UNLIST      lef AVF      Social History     Tobacco Use    Smoking status: Never    Smokeless tobacco: Never   Vaping Use    Vaping Use: Never used   Substance Use Topics    Alcohol use: Never    Drug use: Never     Family History   Problem Relation Age of Onset    Heart Surgery Sister      Allergies   Allergen Reactions    Albumin, Human 25 % Itching     Headache - severe migraine like, itchy eyes, runny nose    Ciprofloxacin Hives    Cyclopentolate Unknown (comments)    Iron Sucrose Diarrhea    Statins-Hmg-Coa Reductase Inhibitors Other (comments)     Body ache       Current Facility-Administered Medications   Medication Dose Route Frequency Provider Last Rate Last Admin    morphine IR (MS IR) tablet 7.5 mg  7.5 mg Oral DAILY PRN Ashlyn Jean Baptiste PA-C   7.5 mg at 11/17/22 0534    morphine (ROXANOL) 20 mg/mL concentrated solution 2.6-5 mg  2.6-5 mg Oral Q4H PRN Tiffany Aaron MD        LORazepam (INTENSOL) 2 mg/mL oral concentrate 0.5 mg  0.5 mg Oral Q4H PRN Tiffany Aaron MD   0.5 mg at 11/17/22 0535    DOPamine (INTROPIN) 800 mg in dextrose 5% 500 mL infusion  5-20 mcg/kg/min IntraVENous TITRATE Ashlyn Jean Baptiste PA-C 22.3 mL/hr at 11/17/22 0147 7 mcg/kg/min at 11/17/22 0147    epoetin caitlin-epbx (RETACRIT) injection 8,000 Units  8,000 Units SubCUTAneous Q TUE, THU & SAT Neftali Robles MD   8,000 Units at 11/15/22 2036    insulin lispro (HUMALOG) injection   SubCUTAneous AC&HS Carla Herrera MD   2 Units at 11/16/22 2216    glucose chewable tablet 16 g  4 Tablet Oral PRN Carla Herrera MD        glucagon (GLUCAGEN) injection 1 mg  1 mg IntraMUSCular PRN Carla Herrera MD        dextrose 10% infusion 0-250 mL  0-250 mL IntraVENous PRN Carla Herrera MD        aspirin chewable tablet 81 mg  81 mg Oral DAILY Anton Showers JUAN ALBERTO, PA-C   81 mg at 11/16/22 0838    clopidogreL (PLAVIX) tablet 75 mg  75 mg Oral DAILY Anton Showers JUAN ALBERTO, PA-C   75 mg at 11/16/22 0838    [Held by provider] midodrine (PROAMATINE) tablet 10 mg  10 mg Oral TID WITH MEALS Violeta Murray MD   10 mg at 11/15/22 0844    Vancomycin: Pharmacy to dose   Other Rx Dosing/Monitoring Violeta Murray MD        cefepime (MAXIPIME) 1 g in 0.9% sodium chloride (MBP/ADV) 50 mL MBP  1 g IntraVENous Q24H Violeta Murray MD 12.5 mL/hr at 11/16/22 2201 1 g at 11/16/22 2201    NOREPINephrine (LEVOPHED) 8 mg in 5% dextrose 250mL (32 mcg/mL) infusion  0.5-50 mcg/min IntraVENous TITRATE Ashlyn Corona PA-C 18.8 mL/hr at 11/16/22 2201 10 mcg/min at 11/16/22 2201    sodium chloride (NS) flush 5-40 mL  5-40 mL IntraVENous Q8H SABA'Ashlyn Stoll PA-C   10 mL at 11/17/22 0537    sodium chloride (NS) flush 5-40 mL  5-40 mL IntraVENous PRN Ashlyn Corona PA-C        acetaminophen (TYLENOL) tablet 650 mg  650 mg Oral Q6H PRN Ashlyn Corona PA-C   650 mg at 11/16/22 2201    Or    acetaminophen (TYLENOL) suppository 650 mg  650 mg Rectal Q6H PRN Ashlyn Petit PA-C        polyethylene glycol (MIRALAX) packet 17 g  17 g Oral DAILY PRN Ashlyn Corona PA-C        ondansetron (ZOFRAN ODT) tablet 4 mg  4 mg Oral Q8H PRN Ashlyn Petit PA-C        Or    ondansetron (ZOFRAN) injection 4 mg  4 mg IntraVENous Q6H PRN Ashlyn Corona PA-C   4 mg at 11/15/22 2335    heparin (porcine) injection 5,000 Units  5,000 Units SubCUTAneous Q8H SABA'Ashlyn Stoll PA-C   5,000 Units at 11/17/22 0534    DULoxetine (CYMBALTA) capsule 20 mg  20 mg Oral DAILY SABA'Ashlyn Stoll PA-C   20 mg at 11/16/22 0834    famotidine (PF) (PEPCID) 20 mg in 0.9% sodium chloride 10 mL injection  20 mg IntraVENous Q24H Ashlyn Petit PA-C   20 mg at 11/16/22 0834    thiamine (B-1) 200 mg in 0.9% sodium chloride 50 mL IVPB  200 mg IntraVENous DAILY Ashlyn Petit PA-C 200 mL/hr at 11/16/22 0834 200 mg at 09/83/41 6954    folic acid (FOLVITE) tablet 1 mg  1 mg Oral DAILY Ashlyn Petit PA-C   1 mg at 11/16/22 0834        ROS    (positive findings are in BOLD; negative findings are in regular font)  Constitutional: fevers, chills, appetite changes, weight changes, fatigue, confusion  HEENT: changes in vision, changes in hearing, sore throat, dysphagia  Cardiovascular: chest pain, palpitations, PND, orthopnea, edema  Pulmonary: SOB, cough, sputum production, wheezing, chest tightness  Gastrointestinal: abdominal pain, nausea/vomiting, diarrhea, constipation, melena, hematochezia  Genitourinary: dysuria, hesitation, dribbling, urgency, hematuria  Musculoskeletal: arthralgias, myalgias  Skin: rash, itching  Neurological: sensory changes, motor changes, headache  Psychiatric: mood changes  Endocrine: heat/cold intolerance  Heme: easy bruising/easy bleeding, LAD     OBJECTIVE:  Patient Vitals for the past 24 hrs:   BP Temp Pulse Resp SpO2   11/17/22 0400 (!) 97/35 -- 76 25 93 %   11/17/22 0300 (!) 132/31 -- 71 21 96 %   11/17/22 0200 (!) 125/40 -- 66 17 96 %   11/17/22 0100 (!) 116/28 -- 69 24 97 %   11/17/22 0000 (!) 124/45 98.5 °F (36.9 °C) 66 23 95 %   11/16/22 2300 (!) 93/45 -- 63 26 95 %   11/16/22 2200 (!) 93/55 -- 62 15 90 %   11/16/22 2100 (!) 107/56 -- 63 20 (!) 68 %   11/16/22 2000 (!) 128/97 98.6 °F (37 °C) 64 15 (!) 68 %   11/16/22 1900 (!) 86/74 -- 61 22 (!) 65 %   11/16/22 1845 (!) 117/99 -- 62 13 --   11/16/22 1830 102/62 -- 63 20 (!) 69 %   11/16/22 1815 (!) 114/51 -- 64 22 (!) 69 %   11/16/22 1800 (!) 112/56 -- 65 18 (!) 74 %   11/16/22 1745 (!) 120/54 -- 66 17 (!) 69 %   11/16/22 1730 102/87 -- 65 25 --   11/16/22 1715 -- -- 68 20 100 %   11/16/22 1700 (!) 118/32 -- 66 16 93 %   11/16/22 1645 (!) 109/55 -- 63 21 (!) 89 %   11/16/22 1630 (!) 117/44 -- 61 9 (!) 64 %   11/16/22 1615 95/77 -- 63 14 98 %   11/16/22 1600 (!) 106/43 -- 62 18 (!) 88 %   11/16/22 1545 (!) 104/57 -- 63 21 96 %   11/16/22 1530 (!) 74/62 -- 65 13 96 %   11/16/22 1515 (!) 79/40 -- 62 20 98 %   11/16/22 1500 (!) 77/54 -- 61 17 (!) 82 %   11/16/22 1445 (!) 54/44 -- 63 13 93 %   11/16/22 1430 (!) 111/51 -- 67 22 (!) 84 %   11/16/22 1415 (!) 96/56 -- (!) 59 21 (!) 68 %   11/16/22 1400 (!) 106/34 -- 67 22 (!) 89 %   11/16/22 1345 (!) 112/40 -- 64 19 91 %   11/16/22 1330 (!) 151/139 -- 64 20 96 %   11/16/22 1315 (!) 128/100 -- 64 30 99 %   11/16/22 1300 (!) 98/44 -- (!) 59 13 100 %   11/16/22 1245 (!) 84/48 -- (!) 57 26 95 %   11/16/22 1230 (!) 88/71 -- 60 23 (!) 85 %   11/16/22 1215 -- -- 60 15 95 %   11/16/22 1200 -- 98.5 °F (36.9 °C) 63 21 93 %   11/16/22 1145 90/61 -- 61 16 (!) 67 %   11/16/22 1130 (!) 76/46 -- -- (!) 32 --   11/16/22 1115 (!) 72/51 -- (!) 52 16 99 %   11/16/22 1100 (!) 84/44 -- (!) 52 18 98 %   11/16/22 1045 -- -- (!) 52 16 (!) 86 %   11/16/22 1030 (!) 139/93 -- (!) 53 13 (!) 82 %   11/16/22 1015 (!) 155/143 -- -- -- --   11/16/22 1000 94/81 -- (!) 51 16 99 %   11/16/22 0945 -- -- (!) 55 18 (!) 80 %   11/16/22 0930 115/80 -- 60 20 100 %   11/16/22 0915 (!) 129/108 -- 61 16 100 %   11/16/22 0900 (!) 138/118 -- 63 16 100 %   11/16/22 0845 (!) 124/97 -- 61 18 100 %   11/16/22 0830 -- -- 64 12 100 %   11/16/22 0815 (!) 104/50 -- 66 18 (!) 88 %   11/16/22 0800 (!) 91/53 98.2 °F (36.8 °C) 63 15 98 %   11/16/22 0745 (!) 96/47 -- 63 13 95 %   11/16/22 0730 (!) 99/47 -- 63 15 92 %   11/16/22 0715 (!) 97/41 -- 65 16 93 %   11/16/22 0700 (!) 97/41 -- 64 17 93 %       Body mass index is 34.27 kg/m². PHYSICAL EXAM:    General: The patient appears comfortable in bed, tired  HEENT: NCAT, EOM intact; oral mucosa well perfused  Neck: supple, no LAD, no tenderness  CVS: bradycardic, regular rhythm, S1, S2 normal, no murmur, click, rub or gallop  Lungs: chest clear, no wheezing, rales, normal symmetric air entry    Abdomen: Soft, non-distended, non-TTP, BS+, no organomegaly, no masses  Ext: No calf tenderness, peripheral pulses present, no significant edema.   Skin: significant ecchymosis over upper extremities, particularly at needle puncture sites  Neuro: No focal neurologic deficits or gross abnormalities  Psych: A&Ox4     RECENT LABS AND IMAGING ON ADMISSION   Recent Results (from the past 24 hour(s))   GLUCOSE, POC    Collection Time: 11/16/22  8:30 AM   Result Value Ref Range    Glucose (POC) 119 (H) 70 - 110 mg/dL   GLUCOSE, POC    Collection Time: 11/16/22 11:38 AM   Result Value Ref Range    Glucose (POC) 141 (H) 70 - 110 mg/dL   GLUCOSE, POC    Collection Time: 11/16/22  4:51 PM   Result Value Ref Range    Glucose (POC) 156 (H) 70 - 110 mg/dL   GLUCOSE, POC    Collection Time: 11/16/22 10:15 PM   Result Value Ref Range    Glucose (POC) 184 (H) 70 - 110 mg/dL        XR (Most Recent). Results from [place] encounter on 11/14/22    XR CHEST PORT    Narrative  EXAM: XR CHEST PORT    INDICATION: chest pain    COMPARISON: 10/21/2022. FINDINGS: A single view of the chest demonstrates chronic linear scarring  without change. No new lung findings. Stable elevated right hemidiaphragm. .  Stable cardiac silhouette. . No acute bone findings. .    Impression  No acute findings or interval change. CT (Most Recent) Results from Hospital Encounter encounter on 11/14/22    CTA CHEST ABD PELV W WO CONT    Narrative  CT angiogram aorta. CT chest, abdomen and pelvis without and with IV contrast.    HISTORY: Chest pain and back pain with hypotension. All CT scans at this facility are performed using dose optimization technique as  appropriate to a performed exam, to include automated exposure control,  adjustment of the mA and/or kV according to patient size (including appropriate  matching for site specific examination) or use of iterative reconstruction  technique. Dialysis patient, to get dialysis the next morning. Axial CT images obtained. Sagittal and coronal MIP images of the aorta were  generated. Dedicated 3-D images of the aorta and its branches also generated on  a dedicated workstation. Sagittal and coronal images of abdomen and pelvis also  generated.     CT angiogram aorta: Noncontrast study shows no intramural hematoma. Contrast  study shows normal caliber throughout. No aortic dissection. Moderate  atherosclerotic calcification present. No focal aneurysm. Some degree of  stenosis due to plaques in the celiac and SMA origins. PATRICIO is occluded, also  obscured by motion artifacts. Stenotic renal arteries. Iliac arteries are within  normal caliber. Minimal ectasia of the bifurcation. CT CHEST: No pulmonary infiltrate or consolidation. No pleural effusion or  pneumothorax. No mediastinal or hilar mass. No cardiomegaly or pericardial  effusion. Corticated calcification noted. No central pulmonary embolism. CT ABDOMEN/PELVIS: Liver and spleen unremarkable. Cholecystectomy. Small hiatal  hernia. Gastric surgery. Pancreas grossly unremarkable. No adrenal mass. Atrophic kidneys with cystic lesions. Double J ureteral stent on the right. No  surrounding inflammation. Small bowel and colon not distended. No extraluminal inflammation. Suspect  scattered colonic diverticulosis. No ascites or free air. Mild subcutaneous edema throughout. Small amount of excreted contrast material  in the decompressed bladder. Uterus unremarkable. No pelvic mass. Degenerative disease in the thoracolumbar spine. Old left inferior pubic ramal  fracture. Impression  1. No aortic aneurysm or dissection. Moderately advanced atherosclerotic  disease. 2. No acute abnormalities in the chest, abdomen or pelvis. ECHO No results found for this or any previous visit. EKG No results found for this or any previous visit. I have discussed Ms. Lay Stern case with my attending who agrees with the plan of care.     Nelson Carlin DO , PGY-1   [facility]   November 17, 2022, 1:52 PM

## 2022-11-17 NOTE — PROGRESS NOTES
Palliative Medicine follow-up note    Patient Name: Arianna Escamilla  YOB: 1945    Date of Initial Consult: November 15, 2022  Date of service: 11/17/2022  Reason for Consult: goals of care discussion and hospice discussion  Requesting Provider: ICU team  Primary Care Physician: Deborah Claros MD      SUMMARY:     Arianna Escamilla is a 76 y.o. with a past history of chronic hypotension, right hydronephrosis with stent, depression, A. fib, diabetes with neuropathy and ESRD on hemodialysis, who was admitted on 11/14/2022 from home with a diagnosis of acute on chronic hypotension, symptomatic bradycardia, diarrhea and hallucination. Reportedly, patient had nausea vomiting and diarrhea since She was started on digoxin. She started having significant weakness and decided to come to the emergency room. In the emergency room patient was noted to have hypotension and bradycardia. Was started on Levophed and dopamine. Patient expressed her wish to ICU team about stopping all the treatment and wanted to talk to hospice. Current medical issues leading to Palliative Medicine involvement include: To discuss goals of care including hospice discussion. 11/17/22: Worsening palliative care RN. Patient is awake. However she stated she had a rough night and required some anxiety medication. She feels she is under effect of these medications currently. Denies any chest pain or shortness of breath. No nausea or vomiting. No headaches or dizziness. She remains on Levophed and dopamine drips. 11/16/22: Patient was seen in presence of palliative care RN. Patient denies any headaches or dizziness currently. No chest pain or abdominal pain currently. Off-and-on shortness of breath present. No nausea or vomiting. She states she talked to her son yesterday about her wishes. She also expresses her wish about not having pacemaker. Patient is currently on Levophed and dopamine drips.     11/15/22: Patient was seen in presence of palliative care staff members. Patient is able to carry on conversation without any issues. Denies headaches or dizziness. Denies any chest pain or abdominal pain. Dyspnea on exertion present. No nausea or vomiting currently. She states she has been on dialysis for last 2 years. She lives with her son and daughter-in-law. Her dialysis doctor's name is Dr. Rayna Goodrich. PALLIATIVE DIAGNOSES:   Goals of care discussion with hospice care discussion  2. ESRD on hemodialysis  3. Acute on chronic hypotension requiring IV pressors  4. Sinus bradycardia  5. Poor functional status    Patient Active Problem List   Diagnosis Code    History of acute pyelonephritis Z87.448    History of infection with vancomycin resistant Enterococcus (VRE) Z86.19    Anemia in end-stage renal disease ([facility]) N18.6, D63.1    Chronic hypotension I95.89    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 (formerly [facility]) E11.22, N18.6    Secondary hyperparathyroidism of renal origin ([facility] [address] N25.81    Gastroesophageal reflux disease K21.9    History of recurrent urinary tract infection Z87.440    History of sepsis Z86.19    End-stage renal disease on hemodialysis (formerly [facility]) N18.6, Z99.2    History of hydronephrosis Z87.448    History of septic shock Z86.19    Anticoagulated by anticoagulation treatment Z79.01    Paroxysmal atrial fibrillation (formerly [facility]) I48.0    Closed fracture of left inferior pubic ramus, with routine healing, subsequent encounter S32.592D    Closed nondisplaced fracture of anterior wall of left acetabulum with routine healing S32.415D    Closed fracture of superior pubic ramus, left, with routine healing, subsequent encounter S32.512D    Multiple closed pelvic fractures without disruption of pelvic ring ([facility] [address] S32.82XA    Depression F32. A    History of urinary tract infection Z87.440    Need for prophylactic isolation Z41.8    Hyperlipidemia E78.5    Hypothyroidism E03.9    History of kidney stones Z87.442    Chronic pain G89.29 Lung mass R91.8    History of urethral stricture Z87.448    Uric acid nephrolithiasis N20.0    Urinary incontinence R32    Glaucoma H40.9    Hyperphosphatemia E83.39    Mononeuropathy G58.9    Hyperkalemia E87.5    Gross hematuria R31.0    Impaired mobility and ADLs Z74.09, Z78.9    Stricture of female urethra N35.92    ESRD on dialysis ([facility]) N18.6, Z99.2    SOB (shortness of breath) on exertion R06.02    A-fib ([facility]) I48.91    Presence of Watchman left atrial appendage closure device Z95.818    Ureteral stricture N13.5    Bradycardia R00.1    Hypotension I95.9          GOALS OF CARE / TREATMENT PREFERENCES:     GOALS OF CARE:    11/17/22: Patient was seen in presence of palliative care RN. The reason be followed up on her today is because patient is still on Levophed and dopamine drips in ICU attending is unsure what direction we need to head towards as they have a hard time to wean her off drips. We saw this patient and she stated she required some anxiety medications. She recognized us. We discussed with her about potentially stopping Levophed and dopamine drips and may have to start her on comfort measures here as the medical team is unable to wean her off these medications. She verbalized understanding about it. We also discussed with her about becoming more hypotensive and bradycardic when we stop drips. However, patient stated that it is too much for her to decide about an would like us to continue require medication, dialysis at this point. She wants some time to think over it. She stated she is sure that she does not want to pursue dialysis outpatient and will be going home with hospice but she is not sure to start comfort measures here currently. She also stated that her son was here earlier this morning. I informed patient to notify ICU team if she change her mind and to consider starting her on comfort measures here. She verbalized understanding about it.   ICU team has been notified about it.  Dr. Roro Dick stated he will try to talk to patient's son if he sees him here today. We will continue to follow this patient with you. Plan: DNR/DNI, limited interventions, continue current management in form of Levophed and dopamine drips, resumption of dialysis as patient does not want to go on comfort measures currently. 11/16/22: Patient was seen in presence of palliative care RN. She is awake and oriented x3. She remembers me from previous visit. She knows the purpose of our visit. She stated that her son came to visit her yesterday and she already informed him what she wants. She understands the consequences of stopping dialysis and not having pacemaker. She stated she wants to go home with hospice. She gave me permission to talk to her son. I spoke to patient's son over the phone and informed him about my conversation with the patient. He stated that they would like to start hospice upon discharge and until then they would like to continue current medical treatment and he also understands that they do not want her mother to have a pacemaker. I informed him that we will try to wean her off medication and see she remained stable if not, we will talk to his mom and him on Friday about inpatient comfort measures. He verbalized understanding about it. I went back to inform patient about it and she also completed the post for hospice upon discharge. I called patient's nephrologist and informed him about patient's decision stopping dialysis upon discharge. Patient also requested me to put her on some medications especially for anxiety and shortness of breath on exertion. Patient will be started on Ativan and morphine as needed. Plan: Patient is DNR/DNI, limited intervention until she stays here, hospice upon discharge. 11/15/22: Patient was seen in presence of palliative care staff members. Patient is awake and oriented x3.   We introduced ourselves and explained her the purpose of our visit. Patient is able to carry on conversation. Patient has AMD and post form on the file. She wants her son and daughter-in-law to be medical power of  and understands the meaning of DNR/DNI. She wants to continue with current AMD in the post form. Patient stated that she is tired of being on dialysis and keep coming back and forth to the hospital.  We discussed with her about consequences of stopping dialysis including worsening symptoms in form of shortness of breath, delirium, nausea/vomiting etc.  She verbalized understanding about it. She said she has been on dialysis for last 2 years and does not think it is helping her. She lives with her son. She wants us to call her son to discuss the options about hospice at home versus nursing home. She is interested in talking to hospice for informational purposes only at this point. She also mentioned that she will talk to her son and daughter-in-law about her wheezes. She gave us permission to talk to her son. I called patient's son and informed him about the conversation we had with his mother. As per son: Patient goes in this phase off and on and wanted to make sure she understands the consequences of stopping dialysis. He stated he and his wife will come later on today to talk to patient but requested us not to start any other process until they talk to his mother. He is fine having hospice to provide information only. I did state to him that this is his mother's wish but we will hold on making any other decisions until he and his wife talk to patient and we will follow-up with patient tomorrow morning. He is fine with it. Plan: Patient will remain DNR/DNI, limited intervention at this point, hospice consult for information purposes only, will continue to follow this patient with you.       TREATMENT PREFERENCES:   Code Status: DNR    Advance Care Planning:  Advance Care Planning 11/16/2022   Patient's Healthcare Decision Maker is: Named in scanned ACP document   Confirm Advance Directive Yes, on file   Patient Would Like to Complete Advance Directive -   Does the patient have other document types MOST/MOLST/POST/POLST          Other Instructions: PT and OT        HISTORY:     History obtained from: Patient    CHIEF COMPLAINT: Nausea vomiting and diarrhea    HPI/SUBJECTIVE:    The patient is:   [x] Verbal and participatory  [] Non-participatory due to:         FUNCTIONAL ASSESSMENT:     Palliative Performance Scale (PPS):40       Clinical Pain Assessment (nonverbal scale for severity on nonverbal patients):              PSYCHOSOCIAL/SPIRITUAL SCREENING:       Any spiritual / Christian concerns:  [] Yes /  [x] No    Caregiver Burnout:  [] Yes /  [] No /  [x] No Caregiver Present      Anticipatory grief assessment:   [] Normal  / [] Maladaptive          REVIEW OF SYSTEMS:     Positive and pertinent negative findings in ROS are noted above in HPI. The following systems were [x] reviewed / [] unable to be reviewed as noted in HPI  Other findings are noted below. Systems: constitutional, ears/nose/mouth/throat, respiratory, gastrointestinal, genitourinary, musculoskeletal, integumentary, neurologic, psychiatric, endocrine. Positive findings noted below. Modified ESAS Completed by: provider                                            PHYSICAL EXAM:     From RN flowsheet:  Wt Readings from Last 3 Encounters:   11/14/22 85 kg (187 lb 6.3 oz)   11/08/22 91.6 kg (202 lb)   11/01/22 91.6 kg (202 lb)       BP (!) 118/47   Pulse 79   Temp 98.3 °F (36.8 °C)   Resp 18   Ht 5' 2\" (1.575 m)   Wt 85 kg (187 lb 6.3 oz)   SpO2 95%   BMI 34.27 kg/m²     Constitutional: Awake, somewhat restless, follows verbal commands appropriately  Cardiovascular: S1 S2 heard, no murmur appreciated by me.   Respiratory: Diminished breath sound bibasilar without any wheezes currently  Gastrointestinal: soft non-tender, +bowel sounds, nondistended  Musculoskeletal: No pitting pedal edema  Neurologic: following verbal commands, moving all extremities, no tremors noted today. Psychiatric: full affect, no hallucinations, no agitation  Other:     HISTORY:     Past Medical History:   Diagnosis Date    Anemia in end-stage renal disease ([facility] [address]     Anticoagulated by anticoagulation treatment     On Apixaban    Chronic hypotension     On Midodrine    Chronic kidney disease     ESRD on HD MWF    Chronic pain     Closed fracture of left inferior pubic ramus, with routine healing, subsequent encounter 11/12/2021    Closed fracture of superior pubic ramus, left, with routine healing, subsequent encounter 11/12/2021    Closed nondisplaced fracture of anterior wall of left acetabulum with routine healing 11/12/2021    Depression     End-stage renal disease on hemodialysis ([facility] [address]     HD at  [facility] on MWF.  Tel # [phone]    Gastroesophageal reflux disease     Glaucoma     History of acute pyelonephritis 02/20/2020    History of hydronephrosis 10/05/2021    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    Urine culture (collected 10/8/2021, resulted 10/14/2021) yielded growth of >100,000 colonies/ml of Enterococcus faecalis RESISTANT to Ciprofloxacin, Levofloxacin, Tetracycline and Vancomycin    History of kidney stones     History of recurrent urinary tract infection     History of sepsis 06/18/2021    History of septic shock 10/08/2021    History of urethral stricture     History of urinary tract infection 10/08/2021    Urine culture (collected 10/8/2021, resulted 10/14/2021) yielded growth of >100,000 colonies/ml of Enterococcus faecalis RESISTANT to Ciprofloxacin, Levofloxacin, Tetracycline and Vancomycin    Hyperlipidemia     Hyperphosphatemia 11/14/2021    Hypothyroidism     Lung mass     Mononeuropathy     Involving ring finger of left hand    Need for prophylactic isolation 10/08/2021    Urine culture (collected 10/8/2021, resulted 10/14/2021) yielded growth of >100,000 colonies/ml of Enterococcus faecalis RESISTANT to Ciprofloxacin, Levofloxacin, Tetracycline and Vancomycin    Osteoporosis     Paroxysmal atrial fibrillation (HCC)     Secondary hyperparathyroidism of renal origin ([facility] [address]     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 ([facility] [address]     HbA1c (10/8/2021) = 4.6    Uric acid nephrolithiasis     Urinary incontinence       Past Surgical History:   Procedure Laterality Date    HX APPENDECTOMY      HX CHOLECYSTECTOMY      HX GASTRIC BYPASS      Gastric stapling    HX KNEE ARTHROSCOPY      HX UROLOGICAL      right PCN placement    HX UROLOGICAL  07/23/2018    RIGHT URETEROSCOPY WITH HOLMIUM LASER    IR EXCHANGE NEPHRO PERC LT SI  2/21/2020    IR EXCHANGE NEPHRO PERC RT SI  4/13/2020    IR EXCHANGE NEPHRO PERC RT SI  7/17/2020    IR NEPHROSTOMY PERC RT PLC CATH  SI  10/14/2020    IR NEPHROURETERAL PERC RT PLC CATH NEW ACCESS  SI  4/30/2020    [facility] INTRO CATH DIALYSIS CIRCUIT DX ANGRPH FLUOR S&I Left 9/24/2020    FISTULOGRAM LEFT/poss permanent catheter placement performed by Ira Fuentes MD at [facility] CATH LAB    VASCULAR SURGERY PROCEDURE UNLIST      lef AVF      Family History   Problem Relation Age of Onset    Heart Surgery Sister       History reviewed, no pertinent family history.   Social History     Tobacco Use    Smoking status: Never    Smokeless tobacco: Never   Substance Use Topics    Alcohol use: Never     Allergies   Allergen Reactions    Albumin, Human 25 % Itching     Headache - severe migraine like, itchy eyes, runny nose    Ciprofloxacin Hives    Cyclopentolate Unknown (comments)    Iron Sucrose Diarrhea    Statins-Hmg-Coa Reductase Inhibitors Other (comments)     Body ache      Current Facility-Administered Medications   Medication Dose Route Frequency    morphine IR (MS IR) tablet 7.5 mg  7.5 mg Oral DAILY PRN    morphine (ROXANOL) 20 mg/mL concentrated solution 2.6-5 mg  2.6-5 mg Oral Q4H PRN    LORazepam (INTENSOL) 2 mg/mL oral concentrate 0.5 mg  0.5 mg Oral Q4H PRN    DOPamine (INTROPIN) 800 mg in dextrose 5% 500 mL infusion  5-20 mcg/kg/min IntraVENous TITRATE    epoetin caitlin-epbx (RETACRIT) injection 8,000 Units  8,000 Units SubCUTAneous Q TUE, THU & SAT    insulin lispro (HUMALOG) injection   SubCUTAneous AC&HS    glucose chewable tablet 16 g  4 Tablet Oral PRN    glucagon (GLUCAGEN) injection 1 mg  1 mg IntraMUSCular PRN    dextrose 10% infusion 0-250 mL  0-250 mL IntraVENous PRN    aspirin chewable tablet 81 mg  81 mg Oral DAILY    clopidogreL (PLAVIX) tablet 75 mg  75 mg Oral DAILY    [Held by provider] midodrine (PROAMATINE) tablet 10 mg  10 mg Oral TID WITH MEALS    cefepime (MAXIPIME) 1 g in 0.9% sodium chloride (MBP/ADV) 50 mL MBP  1 g IntraVENous Q24H    NOREPINephrine (LEVOPHED) 8 mg in 5% dextrose 250mL (32 mcg/mL) infusion  0.5-50 mcg/min IntraVENous TITRATE    sodium chloride (NS) flush 5-40 mL  5-40 mL IntraVENous Q8H    sodium chloride (NS) flush 5-40 mL  5-40 mL IntraVENous PRN    acetaminophen (TYLENOL) tablet 650 mg  650 mg Oral Q6H PRN    Or    acetaminophen (TYLENOL) suppository 650 mg  650 mg Rectal Q6H PRN    polyethylene glycol (MIRALAX) packet 17 g  17 g Oral DAILY PRN    ondansetron (ZOFRAN ODT) tablet 4 mg  4 mg Oral Q8H PRN    Or    ondansetron (ZOFRAN) injection 4 mg  4 mg IntraVENous Q6H PRN    heparin (porcine) injection 5,000 Units  5,000 Units SubCUTAneous Q8H    DULoxetine (CYMBALTA) capsule 20 mg  20 mg Oral DAILY    famotidine (PF) (PEPCID) 20 mg in 0.9% sodium chloride 10 mL injection  20 mg IntraVENous Q24H    thiamine (B-1) 200 mg in 0.9% sodium chloride 50 mL IVPB  200 mg IntraVENous DAILY    folic acid (FOLVITE) tablet 1 mg  1 mg Oral DAILY        LAB AND IMAGING FINDINGS:     Recent Results (from the past 24 hour(s))   GLUCOSE, POC    Collection Time: 11/16/22 11:38 AM   Result Value Ref Range    Glucose (POC) 141 (H) 70 - 110 mg/dL   GLUCOSE, POC    Collection Time: 11/16/22  4:51 PM   Result Value Ref Range    Glucose (POC) 156 (H) 70 - 110 mg/dL   GLUCOSE, POC    Collection Time: 11/16/22 10:15 PM   Result Value Ref Range    Glucose (POC) 184 (H) 70 - 857 mg/dL   METABOLIC PANEL, BASIC    Collection Time: 11/17/22  6:30 AM   Result Value Ref Range    Sodium 133 (L) 136 - 145 mmol/L    Potassium 3.9 3.5 - 5.5 mmol/L    Chloride 100 100 - 111 mmol/L    CO2 23 21 - 32 mmol/L    Anion gap 10 3.0 - 18 mmol/L    Glucose 175 (H) 74 - 99 mg/dL    BUN 27 (H) 7.0 - 18 MG/DL    Creatinine 8.52 (H) 0.6 - 1.3 MG/DL    BUN/Creatinine ratio 3 (L) 12 - 20      eGFR 4 (L) >60 ml/min/1.73m2    Calcium 8.3 (L) 8.5 - 10.1 MG/DL   CBC WITH AUTOMATED DIFF    Collection Time: 11/17/22  6:30 AM   Result Value Ref Range    WBC 11.4 4.6 - 13.2 K/uL    RBC 2.34 (L) 4.20 - 5.30 M/uL    HGB 7.9 (L) 12.0 - 16.0 g/dL    HCT 24.2 (L) 35.0 - 45.0 %    .4 (H) 78.0 - 100.0 FL    MCH 33.8 24.0 - 34.0 PG    MCHC 32.6 31.0 - 37.0 g/dL    RDW 15.6 (H) 11.6 - 14.5 %    PLATELET 493 270 - 688 K/uL    MPV 9.7 9.2 - 11.8 FL    NRBC 0.3 (H) 0  WBC    ABSOLUTE NRBC 0.03 (H) 0.00 - 0.01 K/uL    NEUTROPHILS 77 (H) 40 - 73 %    LYMPHOCYTES 11 (L) 21 - 52 %    MONOCYTES 9 3 - 10 %    EOSINOPHILS 2 0 - 5 %    BASOPHILS 1 0 - 2 %    IMMATURE GRANULOCYTES 1 (H) 0.0 - 0.5 %    ABS. NEUTROPHILS 8.8 (H) 1.8 - 8.0 K/UL    ABS. LYMPHOCYTES 1.2 0.9 - 3.6 K/UL    ABS. MONOCYTES 1.0 0.05 - 1.2 K/UL    ABS. EOSINOPHILS 0.2 0.0 - 0.4 K/UL    ABS. BASOPHILS 0.1 0.0 - 0.1 K/UL    ABS. IMM. GRANS. 0.1 (H) 0.00 - 0.04 K/UL    DF AUTOMATED     MAGNESIUM    Collection Time: 11/17/22  6:30 AM   Result Value Ref Range    Magnesium 2.2 1.6 - 2.6 mg/dL   PHOSPHORUS    Collection Time: 11/17/22  6:30 AM   Result Value Ref Range    Phosphorus 4.9 2.5 - 4.9 MG/DL             Total time to take care of this patient was 30 minutes and more than 50% of time was spent counseling and coordinating care.      Disclaimer: Sections of this note are dictated using utilizing voice recognition software, which may have resulted in some phonetic based errors in grammar and contents. Even though attempts were made to correct all the mistakes, some may have been missed, and remained in the body of the document. If questions arise, please contact our department.

## 2022-11-17 NOTE — PROGRESS NOTES
attended the interdisciplinary rounds for [facility], who is a 76 y.o.,female. Patients Primary Language is: [place]. According to the patients EMR Gnosticist Affiliation is: Sikhism.      The reason the Patient came to the hospital is:   Patient Active Problem List    Diagnosis Date Noted    ESRD on dialysis ([address] 05/25/2022    Bradycardia 11/14/2022    Hypotension 11/14/2022    Ureteral stricture 11/08/2022    A-fib ([address] 06/27/2022    Presence of Watchman left atrial appendage closure device 06/27/2022    SOB (shortness of breath) on exertion 05/25/2022    Stricture of female urethra 04/12/2022    Gross hematuria 12/02/2021    Hyperkalemia 11/29/2021    Mononeuropathy     Hyperlipidemia     Hypothyroidism     History of kidney stones     Chronic pain     Lung mass     History of urethral stricture     Uric acid nephrolithiasis     Urinary incontinence     Glaucoma     Depression     Multiple closed pelvic fractures without disruption of pelvic ring ([address] 11/19/2021    Impaired mobility and ADLs 11/19/2021    Hyperphosphatemia 11/14/2021    Closed fracture of left inferior pubic ramus, with routine healing, subsequent encounter 11/12/2021    Closed nondisplaced fracture of anterior wall of left acetabulum with routine healing 11/12/2021    Closed fracture of superior pubic ramus, left, with routine healing, subsequent encounter 11/12/2021    Anticoagulated by anticoagulation treatment     Paroxysmal atrial fibrillation ([address]     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    History of septic shock 10/08/2021    History of urinary tract infection 10/08/2021    Need for prophylactic isolation 10/08/2021    History of hydronephrosis 10/05/2021    End-stage renal disease on hemodialysis ([address]     History of sepsis 06/18/2021    History of recurrent urinary tract infection     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 ([address]     Secondary hyperparathyroidism of renal origin ([facility] [address]     Gastroesophageal reflux disease     Chronic hypotension     Anemia in end-stage renal disease ([facility] [address]     History of acute pyelonephritis 02/20/2020          Plan:  Chaplains will continue to follow and will provide pastoral care on an as needed/requested basis.  recommends bedside caregivers page  on duty if patient shows signs of acute spiritual or emotional distress.     [address] [facility]   [phone]

## 2022-11-17 NOTE — INTERDISCIPLINARY ROUNDS
[facility]  Pulmonary, Critical Care, and Sleep Medicine  Interdisciplinary and Ventilator Weaning Rounds    Patient discussed in morning walking rounds and interdisciplinary rounds. ICU day: 11/15/22     Overnight events:   Sun-downed overnight    Assessments and best practice:  Ventilator  N/A     Sedation  N/A   Other pertinent drips  levophed and dopamine , NS @ 50 ml/hr   Lines noted  Femoral CVL , PIV   Critical labs assessed  Yes  Antibiotics  Vancomycin and Cefepime  Medications reviewed  Yes  Pending imaging  none  Pending send out labs  No  Pending Procedures  Paracentesis? Glycemic control  SSI   Stress ulcer prophylaxis. Pepcid  DVT prophylaxis. SQH  Need for Lines, chua assessed. Yes  Restraint Reevaluation     N/A      Family contact/MPOA: Leno Lao (son)   Family updated:  To be updated by ICU staff     Palliative consult within 3 days of admission to ICU-  Ethics Guidance: 21 days      Daily Plans:  Cont to wean Levophed and Dopamine as tolerated   Repeat Blood cultures today  Stop vancomycin  Continue broad spectrum abx cefipime  Palliative follow up    LIZA time 10 minutes        Nacho Ring PA-C  11/17/22  Pulmonary, [address] [facility]

## 2022-11-17 NOTE — DIABETES MGMT
Glycemic Control:  Pt with  history of Diabetes. Pt's blood glucose readings are in  the target range. She required 6 units corrective lispro insulin in 24 hours. Recent Glucose Results:   Lab Results   Component Value Date/Time     (H) 11/17/2022 06:30 AM    GLUCPOC 184 (H) 11/16/2022 10:15 PM    GLUCPOC 156 (H) 11/16/2022 04:51 PM    GLUCPOC 141 (H) 11/16/2022 11:38 AM     Lab Results   Component Value Date/Time    Hemoglobin A1c <3.8 (L) 11/15/2022 04:00 AM     Continue to monitor for glycemic control.   Jonelle Ang MS RD CNS BC-ADM

## 2022-11-17 NOTE — PROGRESS NOTES
[facility]. Pulmonary, Critical Care, and Sleep Medicine    Name: Jesus Hayden MRN: [id number]   : 1945 Hospital: [facility] [place]   Date: 2022  Admission Date: 2022     Chart and notes reviewed. Data reviewed. I have evaluated all findings. [x]I have reviewed the flowsheet and previous days notes. [x]The patient is critically ill on      []Mechanical ventilation [x]Pressors   []BiPAP []       Interval HPI:\"Patient is a 76 y.o. female w/ PMH of chronic hypotension, right hydonephrosis with stent, depression, afibb, DM2 with neuropathy, ESRD on HD presenting to [facility] with c/o pain, weakness, and SOB on . Patient is visibly anxious and uncomfortable. History obtained from patient, chart, PFM, and son. Patient reports 3 days of diarrhea PTA. Son reports poor intake for mos with associated n/v. Son reports worsening decline since starting digoxin. PFM states her HR normally is a lot higher and BP in 87'C-912F systolic in the office. Patient is currently complaining of leg cramps/ pain and restlessness. Patient is complaining of weakness and inability to stand to complete ADLs while at home. Also complaining of presyncope/lightheaded for a few weeks. She is no longer c/o CP or SOB but had this off and on while in the ED per ED notes. Patient states she is overall very uncomfortable. She expressed she is very frustrated with having to return to the hospital many times and has been offered hospice in the past. She states she is not ready for this because she is [place] coward\" but she also is exhausted from all the treatment and discomfort.  \"    Subjective 22  -Hospital Day:   -No N/V, CP/SOB, + mild delirium   -Cont' on levo and dopamine   -Cont' with confusion overnight   -Per Nephro: no dialysis today  -Repeat Blood Cx today   -DC vancomycin, continue Cefepime - blood cultures  likely a contaminant  -Junctional heart rhythm with hypotension requiring 7 of Levo and 11 of Dopamine not compatible with a safe discharge from -ICU - Need input from palliative care. ROS:Review of systems not obtained due to patient factors. Events and notes from last 24 hours reviewed. Patient Active Problem List   Diagnosis Code    History of acute pyelonephritis Z87.448    History of infection with vancomycin resistant Enterococcus (VRE) Z86.19    Anemia in end-stage renal disease (HCC) N18.6, D63.1    Chronic hypotension I95.89    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 ([facility]) E11.22, N18.6    Secondary hyperparathyroidism of renal origin ([facility] [address] N25.81    Gastroesophageal reflux disease K21.9    History of recurrent urinary tract infection Z87.440    History of sepsis Z86.19    End-stage renal disease on hemodialysis ([facility]) N18.6, Z99.2    History of hydronephrosis Z87.448    History of septic shock Z86.19    Anticoagulated by anticoagulation treatment Z79.01    Paroxysmal atrial fibrillation ([facility]) I48.0    Closed fracture of left inferior pubic ramus, with routine healing, subsequent encounter S32.592D    Closed nondisplaced fracture of anterior wall of left acetabulum with routine healing S32.415D    Closed fracture of superior pubic ramus, left, with routine healing, subsequent encounter S32.512D    Multiple closed pelvic fractures without disruption of pelvic ring ([address] S32.82XA    Depression F32. A    History of urinary tract infection Z87.440    Need for prophylactic isolation Z41.8    Hyperlipidemia E78.5    Hypothyroidism E03.9    History of kidney stones Z87.442    Chronic pain G89.29    Lung mass R91.8    History of urethral stricture Z87.448    Uric acid nephrolithiasis N20.0    Urinary incontinence R32    Glaucoma H40.9    Hyperphosphatemia E83.39    Mononeuropathy G58.9    Hyperkalemia E87.5    Gross hematuria R31.0    Impaired mobility and ADLs Z74.09, Z78.9    Stricture of female urethra N35.92    ESRD on dialysis ([address] N18.6, Z99.2    SOB (shortness of breath) on exertion R06.02    A-fib (HCC) I48.91    Presence of Watchman left atrial appendage closure device Z95.818    Ureteral stricture N13.5    Bradycardia R00.1    Hypotension I95.9       Vital Signs:  Visit Vitals  BP (!) 97/35   Pulse 76   Temp 98.5 °F (36.9 °C)   Resp 25   Ht 5' 2\" (1.575 m)   Wt 85 kg (187 lb 6.3 oz)   SpO2 93%   BMI 34.27 kg/m²       O2 Device: None (Room air)   O2 Flow Rate (L/min): 2 l/min   Temp (24hrs), Av.5 °F (36.9 °C), Min:98.2 °F (36.8 °C), Max:98.6 °F (37 °C)       Intake/Output:   Last shift:      No intake/output data recorded.   Last 3 shifts: 11/15 1901 -  0700  In: 5.2 [I.V.:5.2]  Out: 0     Intake/Output Summary (Last 24 hours) at 2022 0757  Last data filed at 2022 1900  Gross per 24 hour   Intake 479.54 ml   Output 0 ml   Net 479.54 ml            Current Facility-Administered Medications   Medication Dose Route Frequency    DOPamine (INTROPIN) 800 mg in dextrose 5% 500 mL infusion  5-20 mcg/kg/min IntraVENous TITRATE    epoetin caitlin-epbx (RETACRIT) injection 8,000 Units  8,000 Units SubCUTAneous Q TUE, THU & SAT    insulin lispro (HUMALOG) injection   SubCUTAneous AC&HS    aspirin chewable tablet 81 mg  81 mg Oral DAILY    clopidogreL (PLAVIX) tablet 75 mg  75 mg Oral DAILY    [Held by provider] midodrine (PROAMATINE) tablet 10 mg  10 mg Oral TID WITH MEALS    Vancomycin: Pharmacy to dose   Other Rx Dosing/Monitoring    cefepime (MAXIPIME) 1 g in 0.9% sodium chloride (MBP/ADV) 50 mL MBP  1 g IntraVENous Q24H    NOREPINephrine (LEVOPHED) 8 mg in 5% dextrose 250mL (32 mcg/mL) infusion  0.5-50 mcg/min IntraVENous TITRATE    sodium chloride (NS) flush 5-40 mL  5-40 mL IntraVENous Q8H    heparin (porcine) injection 5,000 Units  5,000 Units SubCUTAneous Q8H    DULoxetine (CYMBALTA) capsule 20 mg  20 mg Oral DAILY    famotidine (PF) (PEPCID) 20 mg in 0.9% sodium chloride 10 mL injection  20 mg IntraVENous Q24H    thiamine (B-1) 200 mg in 0.9% sodium chloride 50 mL IVPB  200 mg IntraVENous DAILY    folic acid (FOLVITE) tablet 1 mg  1 mg Oral DAILY         Telemetry: [x]gavino []A-flutter []Paced    []A-fib []Multiple PVCs                  Physical Exam:      General: awake, alert, NAD   HEENT:  Anicteric sclerae; pink palpebral conjunctivae; mucosa moist  Resp:  Symmetrical chest expansion, no accessory muscle use; good airway entry; no rales/ wheezing/ rhonchi noted  CV:  S1, S2 present; regular rate and rhythm  GI:  Abdomen soft, non-tender; (+) active bowel sounds  Extremities:  diminished on all extremities; no edema/ cyanosis/ clubbing noted, left upper arm fistula   Skin:  Warm; no rashes/ lesions noted, normal turgor/cap refill   Neurologic:  Non-focal  Devices:  femoral CVL       DATA:  MAR reviewed and pertinent medications noted or modified as needed    Labs:  Recent Labs     11/17/22  0630 11/16/22  0238 11/15/22  0400   WBC 11.4 11.7 16.4*   HGB 7.9* 8.7* 9.5*   HCT 24.2* 26.7* 29.3*    275 309       Recent Labs     11/17/22  0630 11/16/22  0238 11/15/22  1700 11/15/22  0400 11/14/22 2026 11/14/22  1330   * 136  --  136   < > 139   K 3.9 3.7  --  3.6   < > 3.8    100  --  101   < > 101   CO2 23 27  --  26   < > 28   * 152*  --  170*   < > 147*   BUN 27* 23*  --  20*   < > 15   CREA 8.52* 7.47*  --  6.22*   < > 5.42*   CA 8.3* 8.7  --  8.4*   < > 9.1   MG 2.2 2.5 2.4 1.8  --  1.8   PHOS 4.9 4.7  --  4.0  --   --    ALB  --   --   --   --   --  3.3*   ALT  --   --   --   --   --  15   INR  --   --   --   --   --  1.0    < > = values in this interval not displayed. No results for input(s): PH, PCO2, PO2, HCO3, FIO2 in the last 72 hours. No results for input(s): FIO2I, IFO2, HCO3I, IHCO3, HCOPOC, PCO2I, PCOPOC, IPHI, PHI, PHPOC, PO2I, PO2POC in the last 72 hours.     No lab exists for component: IPOC2    Imaging:  [x]   I have personally reviewed the patients radiographs and reports  XR Results (most recent):  XR Results (most recent):  Results from Hospital Encounter encounter on 11/14/22    XR CHEST PORT    Narrative  EXAM: XR CHEST PORT    INDICATION: chest pain    COMPARISON: 10/21/2022. FINDINGS: A single view of the chest demonstrates chronic linear scarring  without change. No new lung findings. Stable elevated right hemidiaphragm. .  Stable cardiac silhouette. . No acute bone findings. .    Impression  No acute findings or interval change. CT Results (most recent):  Results from Hospital Encounter encounter on 11/14/22    CTA CHEST ABD PELV W WO CONT    Narrative  CT angiogram aorta. CT chest, abdomen and pelvis without and with IV contrast.    HISTORY: Chest pain and back pain with hypotension. All CT scans at this facility are performed using dose optimization technique as  appropriate to a performed exam, to include automated exposure control,  adjustment of the mA and/or kV according to patient size (including appropriate  matching for site specific examination) or use of iterative reconstruction  technique. Dialysis patient, to get dialysis the next morning. Axial CT images obtained. Sagittal and coronal MIP images of the aorta were  generated. Dedicated 3-D images of the aorta and its branches also generated on  a dedicated workstation. Sagittal and coronal images of abdomen and pelvis also  generated. CT angiogram aorta: Noncontrast study shows no intramural hematoma. Contrast  study shows normal caliber throughout. No aortic dissection. Moderate  atherosclerotic calcification present. No focal aneurysm. Some degree of  stenosis due to plaques in the celiac and SMA origins. PATRICIO is occluded, also  obscured by motion artifacts. Stenotic renal arteries. Iliac arteries are within  normal caliber. Minimal ectasia of the bifurcation. CT CHEST: No pulmonary infiltrate or consolidation. No pleural effusion or  pneumothorax. No mediastinal or hilar mass.  No cardiomegaly or pericardial  effusion. Corticated calcification noted. No central pulmonary embolism. CT ABDOMEN/PELVIS: Liver and spleen unremarkable. Cholecystectomy. Small hiatal  hernia. Gastric surgery. Pancreas grossly unremarkable. No adrenal mass. Atrophic kidneys with cystic lesions. Double J ureteral stent on the right. No  surrounding inflammation. Small bowel and colon not distended. No extraluminal inflammation. Suspect  scattered colonic diverticulosis. No ascites or free air. Mild subcutaneous edema throughout. Small amount of excreted contrast material  in the decompressed bladder. Uterus unremarkable. No pelvic mass. Degenerative disease in the thoracolumbar spine. Old left inferior pubic ramal  fracture. Impression  1. No aortic aneurysm or dissection. Moderately advanced atherosclerotic  disease. 2. No acute abnormalities in the chest, abdomen or pelvis. 08/15/22    ECHO CATINA W OR WO CONTRAST 08/15/2022 8/15/2022    Interpretation Summary    Left Ventricle: Normal left ventricular systolic function with a visually estimated EF of 55 - 60%. Left Atrium: No left atrial appendage thrombus noted. Watchman well-seated with no residual jet around the device. Signed by: Balire Short MD on 8/15/2022 12:22 PM       IMPRESSION:   Acute on chronic hypotension (OP midodrine) requiring levophed, ? SIRs/Sepsis with recent instrumentation of stent vs hypovolemia due to poor intake vs symptomatic bradycardia  Symptomatic bradycardia- since starting of digoxin in the last month  Sepsis POA- lactic acidosis + hypotension. Now with leukocytosis.  BC x 1 (+) GPC   UTI   Hallucinations- ?due to morphine vs delirium  N/V/D x mos with poor PO intake, suspect gastroparesis, CT abd neg   Hx a fib s/p watchman procedure   ESRD on HD  Hx hydronephrosis with chronic right ureteral stent, recent exchanged 11/8  DM2 with diabetic neuropathy, suspect DAN  Debility with functional weakness, needs assistance with ADLs  HX anxiety/depression      Patient Active Problem List   Diagnosis Code    History of acute pyelonephritis Z87.448    History of infection with vancomycin resistant Enterococcus (VRE) Z86.19    Anemia in end-stage renal disease ([facility]) N18.6, D63.1    Chronic hypotension I95.89    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 ([facility]) E11.22, N18.6    Secondary hyperparathyroidism of renal origin ([facility] [address] N25.81    Gastroesophageal reflux disease K21.9    History of recurrent urinary tract infection Z87.440    History of sepsis Z86.19    End-stage renal disease on hemodialysis ([facility]) N18.6, Z99.2    History of hydronephrosis Z87.448    History of septic shock Z86.19    Anticoagulated by anticoagulation treatment Z79.01    Paroxysmal atrial fibrillation ([facility]) I48.0    Closed fracture of left inferior pubic ramus, with routine healing, subsequent encounter S32.592D    Closed nondisplaced fracture of anterior wall of left acetabulum with routine healing S32.415D    Closed fracture of superior pubic ramus, left, with routine healing, subsequent encounter S32.512D    Multiple closed pelvic fractures without disruption of pelvic ring ([facility] S32.82XA    Depression F32. A    History of urinary tract infection Z87.440    Need for prophylactic isolation Z41.8    Hyperlipidemia E78.5    Hypothyroidism E03.9    History of kidney stones Z87.442    Chronic pain G89.29    Lung mass R91.8    History of urethral stricture Z87.448    Uric acid nephrolithiasis N20.0    Urinary incontinence R32    Glaucoma H40.9    Hyperphosphatemia E83.39    Mononeuropathy G58.9    Hyperkalemia E87.5    Gross hematuria R31.0    Impaired mobility and ADLs Z74.09, Z78.9    Stricture of female urethra N35.92    ESRD on dialysis ([facility]) N18.6, Z99.2    SOB (shortness of breath) on exertion R06.02    A-fib ([facility]) I48.91    Presence of Watchman left atrial appendage closure device Z95.818    Ureteral stricture N13.5    Bradycardia R00.1    Hypotension I95.9 RECOMMENDATIONS:   Neuro: pain medication restarted, cymbalta restarted  Pulm: Supplemental O2 via NC, titrate flow for goal SPO2> 90% Bronchodilators, steroids, pulmonary hygiene care, Aspiration precautions, Keep HOB >30 degrees  CVS :Actively titrate vasopressors aim systolic >84 mmHg, hold rate controlling medications, BP/symptoms improving, cards to see tomorrow   GI: SUP,Zofran PRN for N/V, Diet- ADULT DIET Regular, consider GI consult for persistent n/v   Renal:  Trend Renal indices, nephro following   Hem/Onc: Monitor for s/o active bleeding. I/D:Sepsis bundle per hospital protocol, Blood and Urine cultures drawn and will be followed. BC (+) x 1 GPC Lactic acid normalized. Antibiotics:cefepime, vanco Trend WBCs and temperature curve. Procal was low but wait or cx's to return before d/c abx. Resp viral panel (-)  Endocrine: Q6 glucoses, SSI. TSH normal   Metabolic:  Daily BMP, mag, phos. Trend lytes, replace as needed. Musc/Skin: no acute issues  DNR/DNI, signed POST w limited interventions   Discussed in interdisciplinary rounds     Best practice :    Glycemic control  IHI ICU bundles:   Central Line Bundle Followed     Stress ulcer prophylaxis. Pepcid  DVT prophylaxis. SQH  Need for Lines, chua assessed. Palliative care evaluation. Restraints need.       Jasmin Aleman, DO PGY-1  11/17/22  Family Medicine  [facility]

## 2022-11-18 NOTE — PROGRESS NOTES
attended the interdisciplinary rounds for [facility], who is a 76 y.o.,female. Patients Primary Language is: [place]. According to the patients EMR [employer] Affiliation is: Nondenominational.      The reason the Patient came to the hospital is:   Patient Active Problem List    Diagnosis Date Noted    ESRD on dialysis ([address] 05/25/2022    Bradycardia 11/14/2022    Hypotension 11/14/2022    Ureteral stricture 11/08/2022    A-fib ([address] 06/27/2022    Presence of Watchman left atrial appendage closure device 06/27/2022    SOB (shortness of breath) on exertion 05/25/2022    Stricture of female urethra 04/12/2022    Gross hematuria 12/02/2021    Hyperkalemia 11/29/2021    Mononeuropathy     Hyperlipidemia     Hypothyroidism     History of kidney stones     Chronic pain     Lung mass     History of urethral stricture     Uric acid nephrolithiasis     Urinary incontinence     Glaucoma     Depression     Multiple closed pelvic fractures without disruption of pelvic ring ([address] 11/19/2021    Impaired mobility and ADLs 11/19/2021    Hyperphosphatemia 11/14/2021    Closed fracture of left inferior pubic ramus, with routine healing, subsequent encounter 11/12/2021    Closed nondisplaced fracture of anterior wall of left acetabulum with routine healing 11/12/2021    Closed fracture of superior pubic ramus, left, with routine healing, subsequent encounter 11/12/2021    Anticoagulated by anticoagulation treatment     Paroxysmal atrial fibrillation ([address]     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    History of septic shock 10/08/2021    History of urinary tract infection 10/08/2021    Need for prophylactic isolation 10/08/2021    History of hydronephrosis 10/05/2021    End-stage renal disease on hemodialysis ([address]     History of sepsis 06/18/2021    History of recurrent urinary tract infection     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 ([address]     Secondary hyperparathyroidism of renal origin ([address]     Gastroesophageal reflux disease     Chronic hypotension     Anemia in end-stage renal disease ([address]     History of acute pyelonephritis 02/20/2020          Plan:  Chaplains will continue to follow and will provide pastoral care on an as needed/requested basis.  recommends bedside caregivers page  on duty if patient shows signs of acute spiritual or emotional distress.     [address] [facility]   [phone]

## 2022-11-18 NOTE — PROGRESS NOTES
Reason for Renal Dosing:  Per Renal Dosing Policy    Patient clinical status and labs ordered/reviewed. Pt Weight Weight: 85 kg (187 lb 6.3 oz)   Serum Creatinine Lab Results   Component Value Date/Time    Creatinine 9.62 (H) 11/18/2022 04:00 AM    Creatinine, POC 11.4 (H) 08/18/2022 07:53 AM       Creatinine Clearance Estimated Creatinine Clearance: 4.8 mL/min (A) (based on SCr of 9.62 mg/dL (H)). BUN Lab Results   Component Value Date/Time    BUN 32 (H) 11/18/2022 04:00 AM    BUN, POC 19 (H) 09/24/2020 08:45 AM       WBC Lab Results   Component Value Date/Time    WBC 11.3 11/18/2022 04:00 AM      Temperature Temp: 98 °F (36.7 °C)   HR Pulse (Heart Rate): 65     BP BP: (!) 139/51           Drug type: Antibiotic indicated for Urinary tract infection      Drug/dose: for naïve patient was initiated at: Levofloxacin 750 mg IV x 1 dose followed by 500 mg q48 hours    Continue to monitor.     Signed Jenny Keene PHARMD  Date 11/18/2022  Time 4:41 PM

## 2022-11-18 NOTE — PROGRESS NOTES
[facility]. Pulmonary, Critical Care, and Sleep Medicine    Name: Kip Barnes MRN: [id number]   : 1945 Hospital: [address] [place] [address] Dr   Date: 2022  Admission Date: 2022     Chart and notes reviewed. Data reviewed. I have evaluated all findings. [x]I have reviewed the flowsheet and previous days notes. [x]The patient is critically ill on      []Mechanical ventilation [x]Pressors   []BiPAP []       Interval HPI:\"Patient is a 76 y.o. female w/ PMH of chronic hypotension, right hydonephrosis with stent, depression, afibb, DM2 with neuropathy, ESRD on HD presenting to [facility] with c/o pain, weakness, and SOB on . Patient is visibly anxious and uncomfortable. History obtained from patient, chart, PFM, and son. Patient reports 3 days of diarrhea PTA. Son reports poor intake for mos with associated n/v. Son reports worsening decline since starting digoxin. PFM states her HR normally is a lot higher and BP in 23'E-669O systolic in the office. Patient is currently complaining of leg cramps/ pain and restlessness. Patient is complaining of weakness and inability to stand to complete ADLs while at home. Also complaining of presyncope/lightheaded for a few weeks. She is no longer c/o CP or SOB but had this off and on while in the ED per ED notes. Patient states she is overall very uncomfortable. She expressed she is very frustrated with having to return to the hospital many times and has been offered hospice in the past. She states she is not ready for this because she is [place] coward\" but she also is exhausted from all the treatment and discomfort. \"    Subjective 22  -Hospital Day:   -No N/V, CP/SOB, endorses some confusion/difficulty with word finding. Overall confusion improved. Pt does not want dialysis.  Ongoing goals of care discussion.   -Cont' on levo and dopamine   -Benadryl for pruritus overnight.  -Blood cultures  likely a contaminant; Repeat Blood Cx NG  -Junctional heart rhythm with hypotension requiring 12 of Levo and 5 of Dopamine. Not compatible with a safe discharge from ICU              ROS:Review of systems not obtained due to patient factors. Events and notes from last 24 hours reviewed. Patient Active Problem List   Diagnosis Code    History of acute pyelonephritis Z87.448    History of infection with vancomycin resistant Enterococcus (VRE) Z86.19    Anemia in end-stage renal disease (HCC) N18.6, D63.1    Chronic hypotension I95.89    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 ([facility]) E11.22, N18.6    Secondary hyperparathyroidism of renal origin ([facility] N25.81    Gastroesophageal reflux disease K21.9    History of recurrent urinary tract infection Z87.440    History of sepsis Z86.19    End-stage renal disease on hemodialysis ([facility]) N18.6, Z99.2    History of hydronephrosis Z87.448    History of septic shock Z86.19    Anticoagulated by anticoagulation treatment Z79.01    Paroxysmal atrial fibrillation ([facility]) I48.0    Closed fracture of left inferior pubic ramus, with routine healing, subsequent encounter S32.592D    Closed nondisplaced fracture of anterior wall of left acetabulum with routine healing S32.415D    Closed fracture of superior pubic ramus, left, with routine healing, subsequent encounter S32.512D    Multiple closed pelvic fractures without disruption of pelvic ring ([facility] S32.82XA    Depression F32. A    History of urinary tract infection Z87.440    Need for prophylactic isolation Z41.8    Hyperlipidemia E78.5    Hypothyroidism E03.9    History of kidney stones Z87.442    Chronic pain G89.29    Lung mass R91.8    History of urethral stricture Z87.448    Uric acid nephrolithiasis N20.0    Urinary incontinence R32    Glaucoma H40.9    Hyperphosphatemia E83.39    Mononeuropathy G58.9    Hyperkalemia E87.5    Gross hematuria R31.0    Impaired mobility and ADLs Z74.09, Z78.9    Stricture of female urethra N35.92    [facility] N18.6, Z99.2    SOB (shortness of breath) on exertion R06.02    A-fib (HCC) I48.91    Presence of Watchman left atrial appendage closure device Z95.818    Ureteral stricture N13.5    Bradycardia R00.1    Hypotension I95.9       Vital Signs:  Visit Vitals  BP (!) 89/54   Pulse 64   Temp 98.7 °F (37.1 °C)   Resp 12   Ht 5' 2\" (1.575 m)   Wt 85 kg (187 lb 6.3 oz)   SpO2 98%   BMI 34.27 kg/m²       O2 Device: None (Room air)   O2 Flow Rate (L/min): 2 l/min   Temp (24hrs), Av.5 °F (36.9 °C), Min:98.3 °F (36.8 °C), Max:98.7 °F (37.1 °C)       Intake/Output:   Last shift:      No intake/output data recorded.   Last 3 shifts:  1901 -  0700  In: 1020.2 [I.V.:1020.2]  Out: -     Intake/Output Summary (Last 24 hours) at 2022 0741  Last data filed at 2022 1800  Gross per 24 hour   Intake 1020.24 ml   Output --   Net 1020.24 ml          Current Facility-Administered Medications   Medication Dose Route Frequency    DOPamine (INTROPIN) 800 mg in dextrose 5% 500 mL infusion  5-20 mcg/kg/min IntraVENous TITRATE    epoetin caitlin-epbx (RETACRIT) injection 8,000 Units  8,000 Units SubCUTAneous Q TUE, THU & SAT    insulin lispro (HUMALOG) injection   SubCUTAneous AC&HS    aspirin chewable tablet 81 mg  81 mg Oral DAILY    clopidogreL (PLAVIX) tablet 75 mg  75 mg Oral DAILY    [Held by provider] midodrine (PROAMATINE) tablet 10 mg  10 mg Oral TID WITH MEALS    cefepime (MAXIPIME) 1 g in 0.9% sodium chloride (MBP/ADV) 50 mL MBP  1 g IntraVENous Q24H    NOREPINephrine (LEVOPHED) 8 mg in 5% dextrose 250mL (32 mcg/mL) infusion  0.5-50 mcg/min IntraVENous TITRATE    sodium chloride (NS) flush 5-40 mL  5-40 mL IntraVENous Q8H    heparin (porcine) injection 5,000 Units  5,000 Units SubCUTAneous Q8H    DULoxetine (CYMBALTA) capsule 20 mg  20 mg Oral DAILY    famotidine (PF) (PEPCID) 20 mg in 0.9% sodium chloride 10 mL injection  20 mg IntraVENous Q24H    thiamine (B-1) 200 mg in 0.9% sodium chloride 50 mL IVPB  200 mg IntraVENous DAILY    folic acid (FOLVITE) tablet 1 mg  1 mg Oral DAILY         Telemetry: [x]Junctional rhythm []A-flutter []Paced    []A-fib []Multiple PVCs                  Physical Exam:      General: awake, alert, NAD   HEENT:  PERRL, EOMI, pink palpebral conjunctivae; mucosa moist  Resp:  Symmetrical chest expansion, no accessory muscle use; good airway entry; no rales/ wheezing/ rhonchi noted  CV:  S1, S2 present; regular rate and rhythm  GI:  Abdomen soft, non-tender; (+) active bowel sounds  Extremities:  tank radial/DP pulses intact, no tank LE edema/ cyanosis  Skin:  Warm, normal turgor/cap refill   Neurologic:  Alert, oriented, face symmetric, moving all extremities spontaneously  Devices:  Femoral CVL , PIV REJ      DATA:  MAR reviewed and pertinent medications noted or modified as needed    Labs:  Recent Labs     11/18/22  0400 11/17/22  0630 11/16/22  0238   WBC 11.3 11.4 11.7   HGB 8.0* 7.9* 8.7*   HCT 24.8* 24.2* 26.7*    234 275     Recent Labs     11/18/22  0400 11/17/22  0630 11/16/22  0238   * 133* 136   K 4.2 3.9 3.7    100 100   CO2 20* 23 27   * 175* 152*   BUN 32* 27* 23*   CREA 9.62* 8.52* 7.47*   CA 8.3* 8.3* 8.7   MG 2.4 2.2 2.5   PHOS 5.7* 4.9 4.7     No results for input(s): PH, PCO2, PO2, HCO3, FIO2 in the last 72 hours. No results for input(s): FIO2I, IFO2, HCO3I, IHCO3, HCOPOC, PCO2I, PCOPOC, IPHI, PHI, PHPOC, PO2I, PO2POC in the last 72 hours. No lab exists for component: IPOC2    Imaging:  [x]   I have personally reviewed the patients radiographs and reports  XR Results (most recent):  XR Results (most recent):  Results from Hospital Encounter encounter on 11/14/22    XR CHEST PORT    Narrative  EXAM: XR CHEST PORT    INDICATION: chest pain    COMPARISON: 10/21/2022. FINDINGS: A single view of the chest demonstrates chronic linear scarring  without change. No new lung findings. Stable elevated right hemidiaphragm. .  Stable cardiac silhouette. Elvin Smith  No acute bone findings. .    Impression  No acute findings or interval change. CT Results (most recent):  Results from Hospital Encounter encounter on 11/14/22    CTA CHEST ABD PELV W WO CONT    Narrative  CT angiogram aorta. CT chest, abdomen and pelvis without and with IV contrast.    HISTORY: Chest pain and back pain with hypotension. All CT scans at this facility are performed using dose optimization technique as  appropriate to a performed exam, to include automated exposure control,  adjustment of the mA and/or kV according to patient size (including appropriate  matching for site specific examination) or use of iterative reconstruction  technique. Dialysis patient, to get dialysis the next morning. Axial CT images obtained. Sagittal and coronal MIP images of the aorta were  generated. Dedicated 3-D images of the aorta and its branches also generated on  a dedicated workstation. Sagittal and coronal images of abdomen and pelvis also  generated. CT angiogram aorta: Noncontrast study shows no intramural hematoma. Contrast  study shows normal caliber throughout. No aortic dissection. Moderate  atherosclerotic calcification present. No focal aneurysm. Some degree of  stenosis due to plaques in the celiac and SMA origins. PATRICIO is occluded, also  obscured by motion artifacts. Stenotic renal arteries. Iliac arteries are within  normal caliber. Minimal ectasia of the bifurcation. CT CHEST: No pulmonary infiltrate or consolidation. No pleural effusion or  pneumothorax. No mediastinal or hilar mass. No cardiomegaly or pericardial  effusion. Corticated calcification noted. No central pulmonary embolism. CT ABDOMEN/PELVIS: Liver and spleen unremarkable. Cholecystectomy. Small hiatal  hernia. Gastric surgery. Pancreas grossly unremarkable. No adrenal mass. Atrophic kidneys with cystic lesions. Double J ureteral stent on the right. No  surrounding inflammation. Small bowel and colon not distended.  No extraluminal inflammation. Suspect  scattered colonic diverticulosis. No ascites or free air. Mild subcutaneous edema throughout. Small amount of excreted contrast material  in the decompressed bladder. Uterus unremarkable. No pelvic mass. Degenerative disease in the thoracolumbar spine. Old left inferior pubic ramal  fracture. Impression  1. No aortic aneurysm or dissection. Moderately advanced atherosclerotic  disease. 2. No acute abnormalities in the chest, abdomen or pelvis. 08/15/22    ECHO CATINA W OR WO CONTRAST 08/15/2022 8/15/2022    Interpretation Summary    Left Ventricle: Normal left ventricular systolic function with a visually estimated EF of 55 - 60%. Left Atrium: No left atrial appendage thrombus noted. Watchman well-seated with no residual jet around the device. Signed by: Patrick Timmons MD on 8/15/2022 12:22 PM       IMPRESSION:   Acute on chronic hypotension (OP midodrine) requiring levophed and dopamine- likely secondary to complete heart block. Symptomatic bradycardia- since starting of digoxin in the last month  Positive BC in 1/2 tubes, possible contaminant in the setting of negative procal. Following repeat cultures. Hallucinations- due to morphine vs delirium, improving. Pt with confusion that is worse at nighttime.   N/V/D x mos with poor PO intake, suspect gastroparesis, CT abd neg   Hx a fib s/p watchman procedure   ESRD, pt refusing dialysis  Hx hydronephrosis with chronic right ureteral stent, recent exchanged 11/8  DM2 with diabetic neuropathy, suspect DAN  Debility with functional weakness, needs assistance with ADLs  HX anxiety/depression      Patient Active Problem List   Diagnosis Code    History of acute pyelonephritis Z87.448    History of infection with vancomycin resistant Enterococcus (VRE) Z86.19    Anemia in end-stage renal disease (HCC) N18.6, D63.1    Chronic hypotension I95.89    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 ([facility] E11.22, N18.6    Secondary hyperparathyroidism of renal origin ([facility] N25.81    Gastroesophageal reflux disease K21.9    History of recurrent urinary tract infection Z87.440    History of sepsis Z86.19    End-stage renal disease on hemodialysis ([facility]) N18.6, Z99.2    History of hydronephrosis Z87.448    History of septic shock Z86.19    Anticoagulated by anticoagulation treatment Z79.01    Paroxysmal atrial fibrillation ([facility]) I48.0    Closed fracture of left inferior pubic ramus, with routine healing, subsequent encounter S32.592D    Closed nondisplaced fracture of anterior wall of left acetabulum with routine healing S32.415D    Closed fracture of superior pubic ramus, left, with routine healing, subsequent encounter S32.512D    Multiple closed pelvic fractures without disruption of pelvic ring ([facility] S32.82XA    Depression F32. A    History of urinary tract infection Z87.440    Need for prophylactic isolation Z41.8    Hyperlipidemia E78.5    Hypothyroidism E03.9    History of kidney stones Z87.442    Chronic pain G89.29    Lung mass R91.8    History of urethral stricture Z87.448    Uric acid nephrolithiasis N20.0    Urinary incontinence R32    Glaucoma H40.9    Hyperphosphatemia E83.39    Mononeuropathy G58.9    Hyperkalemia E87.5    Gross hematuria R31.0    Impaired mobility and ADLs Z74.09, Z78.9    Stricture of female urethra N35.92    ESRD on dialysis ([facility]) N18.6, Z99.2    SOB (shortness of breath) on exertion R06.02    A-fib ([facility]) I48.91    Presence of Watchman left atrial appendage closure device Z95.818    Ureteral stricture N13.5    Bradycardia R00.1    Hypotension I95.9        RECOMMENDATIONS:   Neuro: Cymbalta, prn pain medication   Pulm: Supplemental O2 via NC, titrate flow for goal SPO2> 90% Bronchodilators, steroids, pulmonary hygiene care, Aspiration precautions, Keep HOB >30 degrees  CVS: Dopamine, levophed. Actively titrate vasopressors aim systolic >69 mmHg. Wean dopamine today. Restart midodrine.  Cardiology following, recc pacemaker for bradycardia/complete heart block, however not consistent with pts goals of care. GI: SUP, Zofran PRN for N/V, Diet- ADULT DIET Regular, consider GI consult for persistent n/v   Renal: Trend Renal indices, nephro following. Will d/c HD, secondary to pt wishes. Hem/Onc: Monitor for s/o active bleeding. I/D: Sepsis bundle per hospital protocol. Blood and Urine cultures drawn and will be followed. UC negative, Resp viral panel (-), BC (+) x 1 GPC Lactic acid normalized. Procal low. Likely contaminate, BC redrawn; NGTD. Antibiotics:cefepime. D/C vanc. Trend WBCs and temperature curve. Endocrine: Q6 glucoses, SSI. TSH normal   Metabolic:  Daily BMP, mag, phos. Trend lytes, replace as needed. Musc/Skin: no acute issues  DNR/DNI, signed POST w limited interventions   Discussed in interdisciplinary rounds     Best practice :    Glycemic control  IHI ICU bundles:   Central Line Bundle Followed     Stress ulcer prophylaxis. Pepcid  DVT prophylaxis. SQH  Need for Lines, chua assessed. Palliative care evaluation. Restraints need.       Linda Zambrano MD, PGY-1  11/18/22  [facility] Emergency Medicine Resident

## 2022-11-18 NOTE — PROGRESS NOTES
Cardiology Progress Note    Admit Date: 11/14/2022  Attending Cardiologist: Dr. Chuckie Caceres is as noted below. We will sign off and be available. Ashleigh White MD    Assessment:     Hospital Problems  Date Reviewed: 11/14/2022            Codes Class Noted POA    Bradycardia ICD-10-CM: R00.1  ICD-9-CM: 427.89  11/14/2022 Unknown        Hypotension ICD-10-CM: I95.9  ICD-9-CM: 458.9  11/14/2022 Unknown         -A/c hypotension, on Midodrine as outpatient. Multifactorial in setting of below. -Bradycardia, on 12.5 mg lopressor BID and digoxin EOD. HR in the low 40s on prior ECGs and hospitalizations over the last year. Suspect underlying SSS. -Chronic N/V/D, possible gastroparesis.   -UTI. -Chronic dizziness, at baseline.   -Chest pain, MI r/o with serial negative cardiac biomarkers. S/p LHC 05/2022 without evidence of obstructive CAD. Moderate disease noted in distal RCA. -Paroxysmal Afib. s/p Watchman GOLD device 6/27/22  -Hydronephrosis with right ureteral stent, s/p exchange 11/8/22. -HLD, not on statin d/t intolerance. -DM with neuropathy. -ESRD on HD, M/W/F  -Anemia of chronic disease.   -Chronic hypotension, on Midodrine. -H/o hematuria. -Hypothyroidism on synthroid  -Poor functional status         Primary cardiologist is Dr. Julio Tracey.    Plan:     No further recommendations from a CV standpoint. Noted patient is wanting to stop HD as an outpatient with plans for hospice once discharged, per palliative note. Will sign off and be available as needed. Subjective:     No new complaints.      Objective:      Patient Vitals for the past 8 hrs:   Temp Pulse Resp BP SpO2   11/18/22 1345 -- 65 18 -- 94 %   11/18/22 1330 -- 60 16 (!) 124/29 95 %   11/18/22 1315 -- 64 18 (!) 128/58 97 %   11/18/22 1300 -- 66 20 (!) 136/46 --   11/18/22 1245 -- 63 19 (!) 123/51 92 %   11/18/22 1230 -- 67 21 103/88 96 %   11/18/22 1215 -- 68 23 -- 94 %   11/18/22 1200 98 °F (36.7 °C) 67 22 115/82 100 %   11/18/22 1152 -- 67 -- -- --   11/18/22 1145 -- 67 21 -- 97 %   11/18/22 1130 -- 67 19 (!) 135/118 100 %   11/18/22 1115 -- 64 16 -- (!) 87 %   11/18/22 1100 -- 65 24 (!) 140/122 97 %   11/18/22 1045 -- 68 19 -- 93 %   11/18/22 1030 -- 66 16 (!) 128/47 100 %   11/18/22 1000 -- 69 18 (!) 116/49 (!) 89 %   11/18/22 0930 -- 71 21 (!) 124/47 93 %   11/18/22 0900 -- 78 23 (!) 135/44 97 %   11/18/22 0830 -- 73 21 -- 96 %   11/18/22 0806 -- 69 -- (!) 98/48 --   11/18/22 0800 98.4 °F (36.9 °C) 70 13 (!) 98/48 99 %   11/18/22 0730 -- 69 19 -- 90 %   11/18/22 0700 -- 71 22 (!) 98/48 97 %           Patient Vitals for the past 96 hrs:   Weight   11/14/22 1507 85 kg (187 lb 6.3 oz)         TELE: SR               Current Facility-Administered Medications   Medication Dose Route Frequency Last Admin    midodrine (PROAMATINE) tablet 10 mg  10 mg Oral TID WITH MEALS 10 mg at 11/18/22 1152    bisacodyL (DULCOLAX) suppository 10 mg  10 mg Rectal DAILY PRN      morphine IR (MS IR) tablet 7.5 mg  7.5 mg Oral DAILY PRN 7.5 mg at 11/17/22 2321    morphine (ROXANOL) 20 mg/mL concentrated solution 2.6-5 mg  2.6-5 mg Oral Q4H PRN      LORazepam (INTENSOL) 2 mg/mL oral concentrate 0.5 mg  0.5 mg Oral Q4H PRN 0.5 mg at 11/17/22 2321    DOPamine (INTROPIN) 800 mg in dextrose 5% 500 mL infusion  5-20 mcg/kg/min IntraVENous TITRATE 5 mcg/kg/min at 11/18/22 0806    epoetin caitlin-epbx (RETACRIT) injection 8,000 Units  8,000 Units SubCUTAneous Q TUE, THU & SAT 8,000 Units at 11/17/22 2141    insulin lispro (HUMALOG) injection   SubCUTAneous AC&HS 4 Units at 11/18/22 1152    glucose chewable tablet 16 g  4 Tablet Oral PRN      glucagon (GLUCAGEN) injection 1 mg  1 mg IntraMUSCular PRN      dextrose 10% infusion 0-250 mL  0-250 mL IntraVENous PRN      aspirin chewable tablet 81 mg  81 mg Oral DAILY 81 mg at 11/18/22 0806    clopidogreL (PLAVIX) tablet 75 mg  75 mg Oral DAILY 75 mg at 11/18/22 0806    cefepime (MAXIPIME) 1 g in 0.9% sodium chloride (MBP/ADV) 50 mL MBP  1 g IntraVENous Q24H 1 g at 11/17/22 2142    NOREPINephrine (LEVOPHED) 8 mg in 5% dextrose 250mL (32 mcg/mL) infusion  0.5-50 mcg/min IntraVENous TITRATE 12 mcg/min at 11/18/22 0805    sodium chloride (NS) flush 5-40 mL  5-40 mL IntraVENous Q8H 10 mL at 11/18/22 1311    sodium chloride (NS) flush 5-40 mL  5-40 mL IntraVENous PRN      acetaminophen (TYLENOL) tablet 650 mg  650 mg Oral Q6H  mg at 11/18/22 0249    Or    acetaminophen (TYLENOL) suppository 650 mg  650 mg Rectal Q6H PRN      polyethylene glycol (MIRALAX) packet 17 g  17 g Oral DAILY PRN      ondansetron (ZOFRAN ODT) tablet 4 mg  4 mg Oral Q8H PRN      Or    ondansetron (ZOFRAN) injection 4 mg  4 mg IntraVENous Q6H PRN 4 mg at 11/15/22 2335    heparin (porcine) injection 5,000 Units  5,000 Units SubCUTAneous Q8H 5,000 Units at 11/18/22 1309    DULoxetine (CYMBALTA) capsule 20 mg  20 mg Oral DAILY 20 mg at 11/18/22 0806    famotidine (PF) (PEPCID) 20 mg in 0.9% sodium chloride 10 mL injection  20 mg IntraVENous Q24H 20 mg at 50/94/18 3110    folic acid (FOLVITE) tablet 1 mg  1 mg Oral DAILY 1 mg at 11/18/22 0806         Intake/Output Summary (Last 24 hours) at 11/18/2022 1431  Last data filed at 11/18/2022 0800  Gross per 24 hour   Intake 982.1 ml   Output --   Net 982.1 ml         Physical Exam:  General:  alert, cooperative, no distress, appears stated age  Neck:  no JVD  Lungs:  clear to auscultation bilaterally  Heart:  regular rate and rhythm, S1, S2 normal, no murmur, click, rub or gallop  Abdomen:  abdomen is soft without significant tenderness, masses, organomegaly or guarding  Extremities:  extremities normal, atraumatic, no cyanosis or edema    Visit Vitals  BP (!) 124/29   Pulse 65   Temp 98 °F (36.7 °C)   Resp 18   Ht 5' 2\" (1.575 m)   Wt 85 kg (187 lb 6.3 oz)   SpO2 94%   BMI 34.27 kg/m²       Data Review:     Labs: Results:       Chemistry Recent Labs     11/18/22  0400 11/17/22  0630 11/16/22  0238   * 175* 152*   NA 133* 133* 136   K 4.2 3.9 3.7    100 100   CO2 20* 23 27   BUN 32* 27* 23*   CREA 9.62* 8.52* 7.47*   CA 8.3* 8.3* 8.7   MG 2.4 2.2 2.5   PHOS 5.7* 4.9 4.7   AGAP 13 10 9   BUCR 3* 3* 3*        CBC w/Diff Recent Labs     11/18/22  0400 11/17/22  0630 11/16/22  0238   WBC 11.3 11.4 11.7   RBC 2.35* 2.34* 2.58*   HGB 8.0* 7.9* 8.7*   HCT 24.8* 24.2* 26.7*    234 275   GRANS 67 77* 67   LYMPH 18* 11* 18*   EOS 3 2 1        Cardiac Enzymes No results found for: CPK, CK, CKMMB, CKMB, RCK3, CKMBT, CKNDX, CKND1, PATTI, TROPT, TROIQ, GRAHAM, TROPT, TNIPOC, BNP, BNPP   Coagulation No results for input(s): PTP, INR, APTT, INREXT, INREXT in the last 72 hours. Lipid Panel No results found for: CHOL, CHOLPOCT, CHOLX, CHLST, CHOLV, 719626, HDL, HDLP, LDL, LDLC, DLDLP, 510308, VLDLC, VLDL, TGLX, TRIGL, TRIGP, TGLPOCT, CHHD, CHHDX   BNP No results found for: BNP, BNPP, XBNPT   Liver Enzymes No results for input(s): TP, ALB, TBIL, AP in the last 72 hours.     No lab exists for component: SGOT, GPT, DBIL     Thyroid Studies Lab Results   Component Value Date/Time    TSH 3.14 11/14/2022 01:30 PM          Signed By: Lorin De Luna PA-C     November 18, 2022

## 2022-11-18 NOTE — PROGRESS NOTES
Palliative Medicine follow-up note    Patient Name: Rika Fitzgerald  YOB: 1945    Date of Initial Consult: November 15, 2022  Date of service: 11/18/2022  Reason for Consult: goals of care discussion and hospice discussion  Requesting Provider: ICU team  Primary Care Physician: Radha Alicea MD      SUMMARY:     Rika Fitzgerald is a 76 y.o. with a past history of chronic hypotension, right hydronephrosis with stent, depression, A. fib, diabetes with neuropathy and ESRD on hemodialysis, who was admitted on 11/14/2022 from home with a diagnosis of acute on chronic hypotension, symptomatic bradycardia, diarrhea and hallucination. Reportedly, patient had nausea vomiting and diarrhea since She was started on digoxin. She started having significant weakness and decided to come to the emergency room. In the emergency room patient was noted to have hypotension and bradycardia. Was started on Levophed and dopamine. Patient expressed her wish to ICU team about stopping all the treatment and wanted to talk to hospice. Current medical issues leading to Palliative Medicine involvement include: To discuss goals of care including hospice discussion. 11/18/22: Patient was seen in presence of palliative care RN. Patient is awake. Denies having any pain. No nausea vomiting. No headaches or dizziness. Patient requested me to talk to her son as she feels confused to make further decisions but she is sure that she wants to go home on hospice and does not want to continue dialysis upon discharge. 11/17/22: Patient was seen in presence of palliative care RN. Patient is awake. However she stated she had a rough night and required some anxiety medication. She feels she is under effect of these medications currently. Denies any chest pain or shortness of breath. No nausea or vomiting. No headaches or dizziness. She remains on Levophed and dopamine drips.     11/16/22: Patient was seen in presence of palliative care RN. Patient denies any headaches or dizziness currently. No chest pain or abdominal pain currently. Off-and-on shortness of breath present. No nausea or vomiting. She states she talked to her son yesterday about her wishes. She also expresses her wish about not having pacemaker. Patient is currently on Levophed and dopamine drips. 11/15/22: Patient was seen in presence of palliative care staff members. Patient is able to carry on conversation without any issues. Denies headaches or dizziness. Denies any chest pain or abdominal pain. Dyspnea on exertion present. No nausea or vomiting currently. She states she has been on dialysis for last 2 years. She lives with her son and daughter-in-law. Her dialysis doctor's name is Dr. Gama Nicole. PALLIATIVE DIAGNOSES:   Goals of care discussion with hospice care discussion  2. ESRD on hemodialysis  3. Acute on chronic hypotension requiring IV pressors  4. Sinus bradycardia  5.   Poor functional status    Patient Active Problem List   Diagnosis Code    History of acute pyelonephritis Z87.448    History of infection with vancomycin resistant Enterococcus (VRE) Z86.19    Anemia in end-stage renal disease ([facility]) N18.6, D63.1    Chronic hypotension I95.89    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 ([facility]) E11.22, N18.6    Secondary hyperparathyroidism of renal origin ([facility] N25.81    Gastroesophageal reflux disease K21.9    History of recurrent urinary tract infection Z87.440    History of sepsis Z86.19    End-stage renal disease on hemodialysis ([facility]) N18.6, Z99.2    History of hydronephrosis Z87.448    History of septic shock Z86.19    Anticoagulated by anticoagulation treatment Z79.01    Paroxysmal atrial fibrillation ([facility]) I48.0    Closed fracture of left inferior pubic ramus, with routine healing, subsequent encounter S32.592D    Closed nondisplaced fracture of anterior wall of left acetabulum with routine healing S32.415D    Closed fracture of superior pubic ramus, left, with routine healing, subsequent encounter S32.512D    Multiple closed pelvic fractures without disruption of pelvic ring ([facility]) S32.82XA    Depression F32. A    History of urinary tract infection Z87.440    Need for prophylactic isolation Z41.8    Hyperlipidemia E78.5    Hypothyroidism E03.9    History of kidney stones Z87.442    Chronic pain G89.29    Lung mass R91.8    History of urethral stricture Z87.448    Uric acid nephrolithiasis N20.0    Urinary incontinence R32    Glaucoma H40.9    Hyperphosphatemia E83.39    Mononeuropathy G58.9    Hyperkalemia E87.5    Gross hematuria R31.0    Impaired mobility and ADLs Z74.09, Z78.9    Stricture of female urethra N35.92    ESRD on dialysis ([facility]) N18.6, Z99.2    SOB (shortness of breath) on exertion R06.02    A-fib ([facility]) I48.91    Presence of Watchman left atrial appendage closure device Z95.818    Ureteral stricture N13.5    Bradycardia R00.1    Hypotension I95.9          GOALS OF CARE / TREATMENT PREFERENCES:     GOALS OF CARE:    11/18/22: Patient was seen in presence of palliative care RN. Patient is more awake today. She recognizes. She remains on Levophed and dopamine drips. Patient is clear that she does not want to continue dialysis upon discharge and does not want to have a pacemaker. However she is unsure whether we can stop trips today or not. I discussed with patient's about consequences of stopping Levophed and dopamine drips in form of worsening bradycardia and hypotension. She verbalized understanding about it. She stated it is too much for her to digest and requested me to talk to her son. I called patient's son and be discussed with him about conversation we had with the patient.   We decided to continue weaning this patient off drips if possible over the weekend, family will come to visit her, son and daughter-in-law will talk to patient over the weekend to make sure she understands the consequences of stopping drips if we are unable to wean her off, and will make plan to stop trips on Monday and transition her to comfort measures if we are unable to get her off drips over the weekend. Son is in agreement with this plan. We have set up time around 10 AM on Monday to make these changes. He is in agreement with it. I have informed him, once we stop drip and patient remained stable, she can come home with hospice. Otherwise, patient will remain in the hospital with comfort measures and may not be able to come home if she becomes unstable. He verbalized understanding about it. ICU team has been notified about it. Plan: Patient will remain DNR/DNI, limited intervention, plan is to wean patient off dopamine and Levophed drips over the weekend, family will visit the patient, and plan to stop trips around 10 AM on Monday if we are unable to wean her off over the weekend unless family/patient change her mind over the weekend. If patient remains stable after stopping drip on Monday, we will try to discharge patient home with hospice. If she becomes unstable, patient will remain in the hospital with comfort measures. 11/17/22: Patient was seen in presence of palliative care RN. The reason be followed up on her today is because patient is still on Levophed and dopamine drips in ICU attending is unsure what direction we need to head towards as they have a hard time to wean her off drips. We saw this patient and she stated she required some anxiety medications. She recognized us. We discussed with her about potentially stopping Levophed and dopamine drips and may have to start her on comfort measures here as the medical team is unable to wean her off these medications. She verbalized understanding about it. We also discussed with her about becoming more hypotensive and bradycardic when we stop drips.   However, patient stated that it is too much for her to decide about an would like us to continue require medication, dialysis at this point. She wants some time to think over it. She stated she is sure that she does not want to pursue dialysis outpatient and will be going home with hospice but she is not sure to start comfort measures here currently. She also stated that her son was here earlier this morning. I informed patient to notify ICU team if she change her mind and to consider starting her on comfort measures here. She verbalized understanding about it. ICU team has been notified about it. Dr. Ganesh Damon stated he will try to talk to patient's son if he sees him here today. We will continue to follow this patient with you. Plan: DNR/DNI, limited interventions, continue current management in form of Levophed and dopamine drips, resumption of dialysis as patient does not want to go on comfort measures currently. 11/16/22: Patient was seen in presence of palliative care RN. She is awake and oriented x3. She remembers me from previous visit. She knows the purpose of our visit. She stated that her son came to visit her yesterday and she already informed him what she wants. She understands the consequences of stopping dialysis and not having pacemaker. She stated she wants to go home with hospice. She gave me permission to talk to her son. I spoke to patient's son over the phone and informed him about my conversation with the patient. He stated that they would like to start hospice upon discharge and until then they would like to continue current medical treatment and he also understands that they do not want her mother to have a pacemaker. I informed him that we will try to wean her off medication and see she remained stable if not, we will talk to his mom and him on Friday about inpatient comfort measures. He verbalized understanding about it. I went back to inform patient about it and she also completed the post for hospice upon discharge.   I called patient's nephrologist and informed him about patient's decision stopping dialysis upon discharge. Patient also requested me to put her on some medications especially for anxiety and shortness of breath on exertion. Patient will be started on Ativan and morphine as needed. Plan: Patient is DNR/DNI, limited intervention until she stays here, hospice upon discharge. 11/15/22: Patient was seen in presence of palliative care staff members. Patient is awake and oriented x3. We introduced ourselves and explained her the purpose of our visit. Patient is able to carry on conversation. Patient has AMD and post form on the file. She wants her son and daughter-in-law to be medical power of  and understands the meaning of DNR/DNI. She wants to continue with current AMD in the post form. Patient stated that she is tired of being on dialysis and keep coming back and forth to the hospital.  We discussed with her about consequences of stopping dialysis including worsening symptoms in form of shortness of breath, delirium, nausea/vomiting etc.  She verbalized understanding about it. She said she has been on dialysis for last 2 years and does not think it is helping her. She lives with her son. She wants us to call her son to discuss the options about hospice at home versus nursing home. She is interested in talking to hospice for informational purposes only at this point. She also mentioned that she will talk to her son and daughter-in-law about her wheezes. She gave us permission to talk to her son. I called patient's son and informed him about the conversation we had with his mother. As per son: Patient goes in this phase off and on and wanted to make sure she understands the consequences of stopping dialysis. He stated he and his wife will come later on today to talk to patient but requested us not to start any other process until they talk to his mother. He is fine having hospice to provide information only.   I did state to him that this is his mother's wish but we will hold on making any other decisions until he and his wife talk to patient and we will follow-up with patient tomorrow morning. He is fine with it. Plan: Patient will remain DNR/DNI, limited intervention at this point, hospice consult for information purposes only, will continue to follow this patient with you. TREATMENT PREFERENCES:   Code Status: DNR    Advance Care Planning:  Advance Care Planning 11/16/2022   Patient's Healthcare Decision Maker is: Named in scanned ACP document   Confirm Advance Directive Yes, on file   Patient Would Like to Complete Advance Directive -   Does the patient have other document types MOST/MOLST/POST/POLST          Other Instructions: PT and OT        HISTORY:     History obtained from: Patient    CHIEF COMPLAINT: Nausea vomiting and diarrhea    HPI/SUBJECTIVE:    The patient is:   [x] Verbal and participatory  [] Non-participatory due to:         FUNCTIONAL ASSESSMENT:     Palliative Performance Scale (PPS):40       Clinical Pain Assessment (nonverbal scale for severity on nonverbal patients):              PSYCHOSOCIAL/SPIRITUAL SCREENING:       Any spiritual / Confucianism concerns:  [] Yes /  [x] No    Caregiver Burnout:  [] Yes /  [] No /  [x] No Caregiver Present      Anticipatory grief assessment:   [] Normal  / [] Maladaptive          REVIEW OF SYSTEMS:     Positive and pertinent negative findings in ROS are noted above in HPI. The following systems were [x] reviewed / [] unable to be reviewed as noted in HPI  Other findings are noted below. Systems: constitutional, ears/nose/mouth/throat, respiratory, gastrointestinal, genitourinary, musculoskeletal, integumentary, neurologic, psychiatric, endocrine. Positive findings noted below.     Modified ESAS Completed by: provider                                            PHYSICAL EXAM:     From RN flowsheet:  Wt Readings from Last 3 Encounters:   11/14/22 85 kg (187 lb 6.3 oz)   11/08/22 91.6 kg (202 lb)   11/01/22 91.6 kg (202 lb)       BP (!) 124/29   Pulse 65   Temp 98 °F (36.7 °C)   Resp 18   Ht 5' 2\" (1.575 m)   Wt 85 kg (187 lb 6.3 oz)   SpO2 94%   BMI 34.27 kg/m²     Constitutional: Awake, NAD, follows verbal commands appropriately  HEENT: Atraumatic, normocephalic, oral mucosa moist.  Neck: No JVD, trachea is midline  Cardiovascular: S1 S2 heard, no murmur appreciated by me. Respiratory: Diminished breath sound bibasilar without any wheezes currently  Gastrointestinal: soft non-tender, +bowel sounds, nondistended  Musculoskeletal: No pitting pedal edema  Neurologic: following verbal commands, moving all extremities, no tremors noted today. Psychiatric: full affect, no hallucinations, no agitation  Other:     HISTORY:     Past Medical History:   Diagnosis Date    Anemia in end-stage renal disease ([facility]     Anticoagulated by anticoagulation treatment     On Apixaban    Chronic hypotension     On Midodrine    Chronic kidney disease     ESRD on HD MWF    Chronic pain     Closed fracture of left inferior pubic ramus, with routine healing, subsequent encounter 11/12/2021    Closed fracture of superior pubic ramus, left, with routine healing, subsequent encounter 11/12/2021    Closed nondisplaced fracture of anterior wall of left acetabulum with routine healing 11/12/2021    Depression     End-stage renal disease on hemodialysis ([facility]     HD at [facility] on MWF.  Tel # [phone]    Gastroesophageal reflux disease     Glaucoma     History of acute pyelonephritis 02/20/2020    History of hydronephrosis 10/05/2021    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    Urine culture (collected 10/8/2021, resulted 10/14/2021) yielded growth of >100,000 colonies/ml of Enterococcus faecalis RESISTANT to Ciprofloxacin, Levofloxacin, Tetracycline and Vancomycin    History of kidney stones     History of recurrent urinary tract infection     History of sepsis 06/18/2021    History of septic shock 10/08/2021    History of urethral stricture     History of urinary tract infection 10/08/2021    Urine culture (collected 10/8/2021, resulted 10/14/2021) yielded growth of >100,000 colonies/ml of Enterococcus faecalis RESISTANT to Ciprofloxacin, Levofloxacin, Tetracycline and Vancomycin    Hyperlipidemia     Hyperphosphatemia 11/14/2021    Hypothyroidism     Lung mass     Mononeuropathy     Involving ring finger of left hand    Need for prophylactic isolation 10/08/2021    Urine culture (collected 10/8/2021, resulted 10/14/2021) yielded growth of >100,000 colonies/ml of Enterococcus faecalis RESISTANT to Ciprofloxacin, Levofloxacin, Tetracycline and Vancomycin    Osteoporosis     Paroxysmal atrial fibrillation (H[facility])     Secondary hyperparathyroidism of renal origin ([facility]     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 ([facility]     HbA1c (10/8/2021) = 4.6    Uric acid nephrolithiasis     Urinary incontinence       Past Surgical History:   Procedure Laterality Date    HX APPENDECTOMY      HX CHOLECYSTECTOMY      HX GASTRIC BYPASS      Gastric stapling    HX KNEE ARTHROSCOPY      HX UROLOGICAL      right PCN placement    HX UROLOGICAL  07/23/2018    RIGHT URETEROSCOPY WITH HOLMIUM LASER    IR EXCHANGE NEPHRO PERC LT SI  2/21/2020    IR EXCHANGE NEPHRO PERC RT SI  4/13/2020    IR EXCHANGE NEPHRO PERC RT SI  7/17/2020    IR NEPHROSTOMY PERC RT PLC CATH  SI  10/14/2020    IR NEPHROURETERAL PERC RT PLC CATH NEW ACCESS  SI  4/30/2020    [place] INTRO CATH DIALYSIS CIRCUIT DX ANGRPH FLUOR S&I Left 9/24/2020    FISTULOGRAM LEFT/poss permanent catheter placement performed by Renetta Sales MD at [facility] CATH LAB    VASCULAR SURGERY PROCEDURE UNLIST      lef AVF      Family History   Problem Relation Age of Onset    Heart Surgery Sister       History reviewed, no pertinent family history.   Social History     Tobacco Use    Smoking status: Never    Smokeless tobacco: Never   Substance Use Topics    Alcohol use: Never     Allergies   Allergen Reactions    Albumin, Human 25 % Itching     Headache - severe migraine like, itchy eyes, runny nose    Ciprofloxacin Hives    Cyclopentolate Unknown (comments)    Iron Sucrose Diarrhea    Statins-Hmg-Coa Reductase Inhibitors Other (comments)     Body ache      Current Facility-Administered Medications   Medication Dose Route Frequency    midodrine (PROAMATINE) tablet 10 mg  10 mg Oral TID WITH MEALS    bisacodyL (DULCOLAX) suppository 10 mg  10 mg Rectal DAILY PRN    morphine IR (MS IR) tablet 7.5 mg  7.5 mg Oral DAILY PRN    morphine (ROXANOL) 20 mg/mL concentrated solution 2.6-5 mg  2.6-5 mg Oral Q4H PRN    LORazepam (INTENSOL) 2 mg/mL oral concentrate 0.5 mg  0.5 mg Oral Q4H PRN    DOPamine (INTROPIN) 800 mg in dextrose 5% 500 mL infusion  5-20 mcg/kg/min IntraVENous TITRATE    epoetin caitlin-epbx (RETACRIT) injection 8,000 Units  8,000 Units SubCUTAneous Q TUE, THU & SAT    insulin lispro (HUMALOG) injection   SubCUTAneous AC&HS    glucose chewable tablet 16 g  4 Tablet Oral PRN    glucagon (GLUCAGEN) injection 1 mg  1 mg IntraMUSCular PRN    dextrose 10% infusion 0-250 mL  0-250 mL IntraVENous PRN    aspirin chewable tablet 81 mg  81 mg Oral DAILY    clopidogreL (PLAVIX) tablet 75 mg  75 mg Oral DAILY    cefepime (MAXIPIME) 1 g in 0.9% sodium chloride (MBP/ADV) 50 mL MBP  1 g IntraVENous Q24H    NOREPINephrine (LEVOPHED) 8 mg in 5% dextrose 250mL (32 mcg/mL) infusion  0.5-50 mcg/min IntraVENous TITRATE    sodium chloride (NS) flush 5-40 mL  5-40 mL IntraVENous Q8H    sodium chloride (NS) flush 5-40 mL  5-40 mL IntraVENous PRN    acetaminophen (TYLENOL) tablet 650 mg  650 mg Oral Q6H PRN    Or    acetaminophen (TYLENOL) suppository 650 mg  650 mg Rectal Q6H PRN    polyethylene glycol (MIRALAX) packet 17 g  17 g Oral DAILY PRN    ondansetron (ZOFRAN ODT) tablet 4 mg  4 mg Oral Q8H PRN    Or    ondansetron (ZOFRAN) injection 4 mg  4 mg IntraVENous Q6H PRN    heparin (porcine) injection 5,000 Units  5,000 Units SubCUTAneous Q8H    DULoxetine (CYMBALTA) capsule 20 mg  20 mg Oral DAILY    famotidine (PF) (PEPCID) 20 mg in 0.9% sodium chloride 10 mL injection  20 mg IntraVENous [id number]    folic acid (FOLVITE) tablet 1 mg  1 mg Oral DAILY        LAB AND IMAGING FINDINGS:     Recent Results (from the past 24 hour(s))   GLUCOSE, POC    Collection Time: 11/17/22  4:21 PM   Result Value Ref Range    Glucose (POC) 130 (H) 70 - 110 mg/dL   GLUCOSE, POC    Collection Time: 11/17/22  9:41 PM   Result Value Ref Range    Glucose (POC) 152 (H) 70 - 191 mg/dL   METABOLIC PANEL, BASIC    Collection Time: 11/18/22  4:00 AM   Result Value Ref Range    Sodium 133 (L) 136 - 145 mmol/L    Potassium 4.2 3.5 - 5.5 mmol/L    Chloride 100 100 - 111 mmol/L    CO2 20 (L) 21 - 32 mmol/L    Anion gap 13 3.0 - 18 mmol/L    Glucose 123 (H) 74 - 99 mg/dL    BUN 32 (H) 7.0 - 18 MG/DL    Creatinine 9.62 (H) 0.6 - 1.3 MG/DL    BUN/Creatinine ratio 3 (L) 12 - 20      eGFR 4 (L) >60 ml/min/1.73m2    Calcium 8.3 (L) 8.5 - 10.1 MG/DL   CBC WITH AUTOMATED DIFF    Collection Time: 11/18/22  4:00 AM   Result Value Ref Range    WBC 11.3 4.6 - 13.2 K/uL    RBC 2.35 (L) 4.20 - 5.30 M/uL    HGB 8.0 (L) 12.0 - 16.0 g/dL    HCT 24.8 (L) 35.0 - 45.0 %    .5 (H) 78.0 - 100.0 FL    MCH 34.0 24.0 - 34.0 PG    MCHC 32.3 31.0 - 37.0 g/dL    RDW 16.0 (H) 11.6 - 14.5 %    PLATELET 063 282 - 473 K/uL    MPV 10.1 9.2 - 11.8 FL    NRBC 0.0 0  WBC    ABSOLUTE NRBC 0.00 0.00 - 0.01 K/uL    NEUTROPHILS 67 40 - 73 %    LYMPHOCYTES 18 (L) 21 - 52 %    MONOCYTES 11 (H) 3 - 10 %    EOSINOPHILS 3 0 - 5 %    BASOPHILS 1 0 - 2 %    IMMATURE GRANULOCYTES 1 (H) 0.0 - 0.5 %    ABS. NEUTROPHILS 7.5 1.8 - 8.0 K/UL    ABS. LYMPHOCYTES 2.0 0.9 - 3.6 K/UL    ABS. MONOCYTES 1.2 0.05 - 1.2 K/UL    ABS. EOSINOPHILS 0.3 0.0 - 0.4 K/UL    ABS. BASOPHILS 0.1 0.0 - 0.1 K/UL    ABS. IMM.  GRANS. 0.2 (H) 0.00 - 0.04 K/UL    DF AUTOMATED     MAGNESIUM    Collection Time: 11/18/22  4:00 AM   Result Value Ref Range    Magnesium 2.4 1.6 - 2.6 mg/dL   PHOSPHORUS    Collection Time: 11/18/22  4:00 AM   Result Value Ref Range    Phosphorus 5.7 (H) 2.5 - 4.9 MG/DL   PROCALCITONIN    Collection Time: 11/18/22  4:00 AM   Result Value Ref Range    Procalcitonin 1.92 ng/mL   GLUCOSE, POC    Collection Time: 11/18/22  8:14 AM   Result Value Ref Range    Glucose (POC) 114 (H) 70 - 110 mg/dL   GLUCOSE, POC    Collection Time: 11/18/22 11:46 AM   Result Value Ref Range    Glucose (POC) 205 (H) 70 - 110 mg/dL             Total time to take care of this patient was 35 minutes and more than 50% of time was spent counseling and coordinating care. Disclaimer: Sections of this note are dictated using utilizing voice recognition software, which may have resulted in some phonetic based errors in grammar and contents. Even though attempts were made to correct all the mistakes, some may have been missed, and remained in the body of the document. If questions arise, please contact our department.

## 2022-11-18 NOTE — PROGRESS NOTES
[facility].  Admission History and Physical      Patient:    Nilton Batista      76 y.o. female            MRN:       [id number]                                                                                    Admission Date:         11/14/2022  Code status:                DNR    Nilton Batista is a 76y.o. year old female admitted for Hypotension [I95.9]  Bradycardia [R00.1]. ASSESSMENT AND PLAN  Problem List Items Addressed This Visit          Circulatory    Chronic hypotension (Chronic)    Hypotension       Urinary    ESRD on dialysis ([facility]       Other    Presence of Watchman left atrial appendage closure device    Bradycardia     Other Visit Diagnoses       Symptomatic bradycardia    -  Primary    ESRD (end stage renal disease) on dialysis ([facility]        Shock ([facility]              Nilton Batista is a 76 y.o.  female with PMHx of chronic hypotension, ESRD on HD, R hydronephrosis w/ stent, a-fib, T2DM w/ neuropathy (controlled), hypothyroidism, and macrocytic anemia who is currently admitted to ICU for management of chronic hypotension and symptomatic bradycardia requiring vasopressor therapy. UA strongly suspicious for UTI, likely urosepsis. Acute on Chronic Hypotension  - Continues to require vasporessors (dopamine and levophed) d/t persistent hypotension; SBP improved to 100s and 90s, but DBP in 20s-50s overall  Plan:  - Continue dopamine and norepinephrine drips; titrate vasopressors w/ goal of SBP >90  - Holding antihypertensives/antiarrhythmics   - Holding midodrine  - Encourage hydration    Hx A-Fib, Now with Symptomatic Bradycardia  - Presence of implanted watchman device, on plavix and aspirin (watchman in place as of august 2022)  - TSH wnl. Digoxin level 1.6.  Goal to \"wash\" all of digoxin out of system  - Cardiology potentially considering pacemaker but patient and her son have declined   - Outpatient metoprolol and digoxin have been discontinued per cardio   Plan:  - Continue dopamine, cardiology following  - Continuous cardiac monitoring  - Holding antihypertensives/antiarrhythmics   - 81 mg ASA and plavix 75 mg daily for watchman device    Urosepsis  - UA returned and was highly suggestive of UTI (+for bacteria, leuk est, nitrites, WBC); urine culture shows no growth   - Blood cultures collected 11/14:  1 of 2 samples grew gram+ cocci in clusters. Likely contaminated sample. - CXR not significant for acute cardiopulm process  - CT chest/abd/pelvis negative for acute pathology  - WBC decreasing; 8.9 > 16.4 > 11.7 > 11.4 w/ neutrophils 77  - LA normalized, 2.04 > 1.98. No longer trending. Plan:  - Continue NS @ 50 cc/hr  - Continue cefepime and vancomycin, to be dosed by pharmacy  - IV famotidine 20 mg daily to prevent stress ulcer  - Daily CBC, monitor for fever  - Following blood cultures    ESRD on HD, Hx hydronephrosis  - Typically MWF HD, last dialyzed earlier on 11/14 for about 3 hrs but felt weak and stopped, after which line fell out of her arm.  - Chronic macrocytic anemia; Hb 8.7>7.9 this AM   - Renal fxn continuing to worsen; Cr 8.52 with GFR 4 today  - Pt has decided to cease dialysis treatments in pursuit of comfort care and limited interventions. If she changes her mind, may need dialysis cath and to switch to CRRT. Plan:  - Nephrology is following. Appreciate their recommendations as pt transitions away from dialysis. - Monitor I/O   - Continue Retacrit Tues, Thurs, Sat  - Continue folate and thiamine  - Daily BMP, magnesium, and phos    N9ZA complicated by diabetic neuropathy and gastroparesis  - Stable; last A1c 4.9.  Glucose 184 overnight.  - A1c < 3.8 on 11/15  Plan:  - Correctional insulin, POC glucose checks q6hrs  - Cymbalta for neuropathy   - Morphine IR 7.5 mg PO q6hr  - PRN tylenol for pain  - PRN Zofran for nausea 2/2 gastroparesis     Debility and Functional Weakness  - Pt w/ chronic diseases, quite debilitating and distressing to pt, including autonomic neuropathy, syncopal episodes, ESRD, chronic hypotension, and chronic nausea and vomiting. Feels weak and tired. - Pt has spoken with her son and agree on hospice care  Plan:  - Palliative care to continue to follow   - Prioritize comfort  - Will pursue hospice care on discharge    Hospital Delirium vs. Anxiety & Depression  - Experiencing some situational depression and dysphoria d/t her declining health, working with palliative care on goals of care. - Periodic states of hallucination and/or confusion   - Alert and oriented to self, date, location  Plan:  - Continue cymbalta 30 mg daily     Code: DNR  Diet: Adult regular  DVT/AC: SQH 5000 units Q8hr  Mobility: per protocol  Disposition: Critical care, ICU    Point of Contact (relationship): [employer], Son: [phone]    SUBJECTIVE:    Patient seen at bedside this AM. Complains of feeling slightly constipated and would like to have a glycerin suppository as needed. Also requesting benadryl for itching. PMHx, PSHx, SHx, FHx, MEDS, ALLERGIES:   Past Medical History:   Diagnosis Date    Anemia in end-stage renal disease ([facility]     Anticoagulated by anticoagulation treatment     On Apixaban    Chronic hypotension     On Midodrine    Chronic kidney disease     ESRD on HD MWF    Chronic pain     Closed fracture of left inferior pubic ramus, with routine healing, subsequent encounter 11/12/2021    Closed fracture of superior pubic ramus, left, with routine healing, subsequent encounter 11/12/2021    Closed nondisplaced fracture of anterior wall of left acetabulum with routine healing 11/12/2021    Depression     End-stage renal disease on hemodialysis ([facility] [facility]     HD at [facility] on MWF.  Tel # [phone]    Gastroesophageal reflux disease     Glaucoma     History of acute pyelonephritis 02/20/2020    History of hydronephrosis 10/05/2021    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    Urine culture (collected 10/8/2021, resulted 10/14/2021) yielded growth of >100,000 colonies/ml of Enterococcus faecalis RESISTANT to Ciprofloxacin, Levofloxacin, Tetracycline and Vancomycin    History of kidney stones     History of recurrent urinary tract infection     History of sepsis 06/18/2021    History of septic shock 10/08/2021    History of urethral stricture     History of urinary tract infection 10/08/2021    Urine culture (collected 10/8/2021, resulted 10/14/2021) yielded growth of >100,000 colonies/ml of Enterococcus faecalis RESISTANT to Ciprofloxacin, Levofloxacin, Tetracycline and Vancomycin    Hyperlipidemia     Hyperphosphatemia 11/14/2021    Hypothyroidism     Lung mass     Mononeuropathy     Involving ring finger of left hand    Need for prophylactic isolation 10/08/2021    Urine culture (collected 10/8/2021, resulted 10/14/2021) yielded growth of >100,000 colonies/ml of Enterococcus faecalis RESISTANT to Ciprofloxacin, Levofloxacin, Tetracycline and Vancomycin    Osteoporosis     Paroxysmal atrial fibrillation (HCC)     Secondary hyperparathyroidism of renal origin ([facility] [address]     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 ([facility] [address]     HbA1c (10/8/2021) = 4.6    Uric acid nephrolithiasis     Urinary incontinence       Past Surgical History:   Procedure Laterality Date    HX APPENDECTOMY      HX CHOLECYSTECTOMY      HX GASTRIC BYPASS      Gastric stapling    HX KNEE ARTHROSCOPY      HX UROLOGICAL      right PCN placement    HX UROLOGICAL  07/23/2018    RIGHT URETEROSCOPY WITH HOLMIUM LASER    IR EXCHANGE NEPHRO PERC LT SI  2/21/2020    IR EXCHANGE NEPHRO PERC RT SI  4/13/2020    IR EXCHANGE NEPHRO PERC RT SI  7/17/2020    IR NEPHROSTOMY PERC RT PLC CATH  SI  10/14/2020    IR NEPHROURETERAL PERC RT PLC CATH NEW ACCESS  SI  4/30/2020    CA INTRO CATH DIALYSIS CIRCUIT DX ANGRPH FLUOR S&I Left 9/24/2020    FISTULOGRAM LEFT/poss permanent catheter placement performed by Edith Parmar MD at [facility] CATH LAB    [address] UNLIST      lef AVF      Social History     Tobacco Use    Smoking status: Never    Smokeless tobacco: Never   Vaping Use    Vaping Use: Never used   Substance Use Topics    Alcohol use: Never    Drug use: Never     Family History   Problem Relation Age of Onset    Heart Surgery Sister      Allergies   Allergen Reactions    Albumin, Human 25 % Itching     Headache - severe migraine like, itchy eyes, runny nose    Ciprofloxacin Hives    Cyclopentolate Unknown (comments)    Iron Sucrose Diarrhea    Statins-Hmg-Coa Reductase Inhibitors Other (comments)     Body ache       Current Facility-Administered Medications   Medication Dose Route Frequency Provider Last Rate Last Admin    midodrine (PROAMATINE) tablet 10 mg  10 mg Oral TID WITH MEALS Kiesha Ku MD   10 mg at 11/18/22 0806    morphine IR (MS IR) tablet 7.5 mg  7.5 mg Oral DAILY PRN Ashlyn Gilbert PA-C   7.5 mg at 11/17/22 2321    morphine (ROXANOL) 20 mg/mL concentrated solution 2.6-5 mg  2.6-5 mg Oral Q4H PRN Timi Alejandro MD        LORazepam (INTENSOL) 2 mg/mL oral concentrate 0.5 mg  0.5 mg Oral Q4H PRN Timi Alejandro MD   0.5 mg at 11/17/22 2321    DOPamine (INTROPIN) 800 mg in dextrose 5% 500 mL infusion  5-20 mcg/kg/min IntraVENous TITRATE Ashlyn Gilbert PA-C 15.9 mL/hr at 11/18/22 0806 5 mcg/kg/min at 11/18/22 0806    epoetin caitlin-epbx (RETACRIT) injection 8,000 Units  8,000 Units SubCUTAneous Q ADYE, THU & SAT Dwaine Simpson MD   8,000 Units at 11/17/22 2141    insulin lispro (HUMALOG) injection   SubCUTAneous AC&HS Matheus Servin MD   2 Units at 11/17/22 2141    glucose chewable tablet 16 g  4 Tablet Oral PRN Matheus Servin MD        glucagon (GLUCAGEN) injection 1 mg  1 mg IntraMUSCular PRN Matheus Servin MD        dextrose 10% infusion 0-250 mL  0-250 mL IntraVENous PRN Matheus Servin MD        aspirin chewable tablet 81 mg  81 mg Oral DAILY Dixon AVENDANO PA-C   81 mg at 11/18/22 0806    clopidogreL (PLAVIX) tablet 75 mg 75 mg Oral DAILY Kenzie AVENDANO PA-C   75 mg at 11/18/22 0806    cefepime (MAXIPIME) 1 g in 0.9% sodium chloride (MBP/ADV) 50 mL MBP  1 g IntraVENous Q24H Violeta Murray MD 12.5 mL/hr at 11/17/22 2142 1 g at 11/17/22 2142    NOREPINephrine (LEVOPHED) 8 mg in 5% dextrose 250mL (32 mcg/mL) infusion  0.5-50 mcg/min IntraVENous TITRATE Ashlyn Soto PA-C 22.5 mL/hr at 11/18/22 0805 12 mcg/min at 11/18/22 0805    sodium chloride (NS) flush 5-40 mL  5-40 mL IntraVENous Q8H Ashlyn Petit PA-C   10 mL at 11/18/22 4844    sodium chloride (NS) flush 5-40 mL  5-40 mL IntraVENous PRN Ashlyn Soto PA-C        acetaminophen (TYLENOL) tablet 650 mg  650 mg Oral Q6H PRN Ashlyn Soto PA-C   650 mg at 11/18/22 5667    Or    acetaminophen (TYLENOL) suppository 650 mg  650 mg Rectal Q6H PRN Ashlyn Petit PA-C        polyethylene glycol (MIRALAX) packet 17 g  17 g Oral DAILY PRN Ashlyn Soto PA-C        ondansetron (ZOFRAN ODT) tablet 4 mg  4 mg Oral Q8H PRN SABA'Ashlyn Stoll PA-C        Or    ondansetron (ZOFRAN) injection 4 mg  4 mg IntraVENous Q6H PRN Ashlyn Soto PA-C   4 mg at 11/15/22 2335    heparin (porcine) injection 5,000 Units  5,000 Units SubCUTAneous Q8H SABA'Ashlyn Stoll PA-C   5,000 Units at 11/18/22 9824    DULoxetine (CYMBALTA) capsule 20 mg  20 mg Oral DAILY SABA'Ashlyn Stoll PA-C   20 mg at 11/18/22 0806    famotidine (PF) (PEPCID) 20 mg in 0.9% sodium chloride 10 mL injection  20 mg IntraVENous Q24H Ashlyn Petit PA-C   20 mg at 72/94/59 6684    folic acid (FOLVITE) tablet 1 mg  1 mg Oral DAILY Ashlyn Petit PA-C   1 mg at 11/18/22 0806        ROS    (positive findings are in BOLD; negative findings are in regular font)  Constitutional: fevers, chills, appetite changes, weight changes, fatigue, confusion  HEENT: changes in vision, changes in hearing, sore throat, dysphagia  Cardiovascular: chest pain, palpitations, PND, orthopnea, edema  Pulmonary: SOB, cough, sputum production, wheezing, chest tightness  Gastrointestinal: abdominal pain, nausea/vomiting, diarrhea, constipation, melena, hematochezia  Genitourinary: dysuria, hesitation, dribbling, urgency, hematuria  Musculoskeletal: arthralgias, myalgias  Skin: rash, itching  Neurological: sensory changes, motor changes, headache  Psychiatric: mood changes  Endocrine: heat/cold intolerance  Heme: easy bruising/easy bleeding, LAD     OBJECTIVE:  Patient Vitals for the past 24 hrs:   BP Temp Pulse Resp SpO2   11/18/22 0806 (!) 98/48 -- 69 -- --   11/18/22 0503 (!) 89/54 -- 64 12 98 %   11/18/22 0430 103/83 -- 68 20 94 %   11/18/22 0400 (!) 83/66 98.7 °F (37.1 °C) 67 18 (!) 73 %   11/18/22 0330 106/78 -- 67 20 --   11/18/22 0200 (!) 101/45 -- 67 17 100 %   11/18/22 0100 (!) 103/50 -- 69 18 100 %   11/18/22 0000 (!) 107/43 98.5 °F (36.9 °C) 69 23 94 %   11/17/22 2300 99/64 -- 65 21 98 %   11/17/22 2200 -- -- 65 25 100 %   11/17/22 2100 (!) 133/49 -- 63 14 93 %   11/17/22 2000 (!) 121/52 98.5 °F (36.9 °C) 62 21 93 %   11/17/22 1648 -- -- 72 -- --   11/17/22 1645 (!) 92/58 -- 68 21 (!) 81 %   11/17/22 1630 (!) 130/55 -- 69 25 95 %   11/17/22 1615 110/73 -- 67 26 95 %   11/17/22 1600 (!) 129/37 -- 67 24 96 %   11/17/22 1545 (!) 113/101 -- 66 26 96 %   11/17/22 1530 -- -- 68 25 (!) 89 %   11/17/22 1515 (!) 108/38 -- 65 14 94 %   11/17/22 1500 (!) 120/40 -- 69 23 90 %   11/17/22 1445 (!) 106/39 -- 65 25 98 %   11/17/22 1430 -- -- 69 22 92 %   11/17/22 1415 -- -- 68 22 91 %   11/17/22 1400 -- -- 68 23 93 %   11/17/22 1345 -- -- 70 18 91 %   11/17/22 1330 (!) 132/50 -- 68 23 94 %   11/17/22 1315 -- -- 72 22 92 %   11/17/22 1300 (!) 155/136 -- 75 24 93 %   11/17/22 1245 -- -- 81 19 96 %   11/17/22 1230 (!) 89/51 -- 73 20 93 %   11/17/22 1215 -- -- 76 20 95 %   11/17/22 1200 104/77 98.7 °F (37.1 °C) 78 25 95 %   11/17/22 1145 108/79 -- 77 25 --   11/17/22 1130 (!) 68/52 -- 78 24 --   11/17/22 1115 -- -- 80 25 --   11/17/22 1100 -- -- 77 25 --       Body mass index is 34.27 kg/m². PHYSICAL EXAM:    General: The patient appears comfortable in bed, tired  HEENT: NCAT, EOM intact; oral mucosa well perfused  Neck: supple, no LAD, no tenderness  CVS: RRR, S1, S2 normal, no murmur, click, rub or gallop  Lungs: chest clear, no wheezing, rales, normal symmetric air entry    Abdomen: Soft, non-distended, non-TTP, BS+, no organomegaly, no masses  Ext: No calf tenderness, peripheral pulses present, no significant edema.   Skin: significant ecchymosis over upper extremities, particularly at needle puncture sites  Neuro: No focal neurologic deficits or gross abnormalities  Psych: A&Ox4     RECENT LABS AND IMAGING ON ADMISSION   Recent Results (from the past 24 hour(s))   CULTURE, BLOOD    Collection Time: 11/17/22 10:04 AM    Specimen: Blood   Result Value Ref Range    Special Requests: NO SPECIAL REQUESTS      Culture result: NO GROWTH AFTER 19 HOURS     CULTURE, BLOOD    Collection Time: 11/17/22 10:04 AM    Specimen: Blood   Result Value Ref Range    Special Requests: NO SPECIAL REQUESTS      Culture result: NO GROWTH AFTER 19 HOURS     GLUCOSE, POC    Collection Time: 11/17/22 11:54 AM   Result Value Ref Range    Glucose (POC) 129 (H) 70 - 110 mg/dL   GLUCOSE, POC    Collection Time: 11/17/22  4:21 PM   Result Value Ref Range    Glucose (POC) 130 (H) 70 - 110 mg/dL   GLUCOSE, POC    Collection Time: 11/17/22  9:41 PM   Result Value Ref Range    Glucose (POC) 152 (H) 70 - 206 mg/dL   METABOLIC PANEL, BASIC    Collection Time: 11/18/22  4:00 AM   Result Value Ref Range    Sodium 133 (L) 136 - 145 mmol/L    Potassium 4.2 3.5 - 5.5 mmol/L    Chloride 100 100 - 111 mmol/L    CO2 20 (L) 21 - 32 mmol/L    Anion gap 13 3.0 - 18 mmol/L    Glucose 123 (H) 74 - 99 mg/dL    BUN 32 (H) 7.0 - 18 MG/DL    Creatinine 9.62 (H) 0.6 - 1.3 MG/DL    BUN/Creatinine ratio 3 (L) 12 - 20      eGFR 4 (L) >60 ml/min/1.73m2    Calcium 8.3 (L) 8.5 - 10.1 MG/DL   CBC WITH AUTOMATED DIFF Collection Time: 11/18/22  4:00 AM   Result Value Ref Range    WBC 11.3 4.6 - 13.2 K/uL    RBC 2.35 (L) 4.20 - 5.30 M/uL    HGB 8.0 (L) 12.0 - 16.0 g/dL    HCT 24.8 (L) 35.0 - 45.0 %    .5 (H) 78.0 - 100.0 FL    MCH 34.0 24.0 - 34.0 PG    MCHC 32.3 31.0 - 37.0 g/dL    RDW 16.0 (H) 11.6 - 14.5 %    PLATELET 336 420 - 210 K/uL    MPV 10.1 9.2 - 11.8 FL    NRBC 0.0 0  WBC    ABSOLUTE NRBC 0.00 0.00 - 0.01 K/uL    NEUTROPHILS 67 40 - 73 %    LYMPHOCYTES 18 (L) 21 - 52 %    MONOCYTES 11 (H) 3 - 10 %    EOSINOPHILS 3 0 - 5 %    BASOPHILS 1 0 - 2 %    IMMATURE GRANULOCYTES 1 (H) 0.0 - 0.5 %    ABS. NEUTROPHILS 7.5 1.8 - 8.0 K/UL    ABS. LYMPHOCYTES 2.0 0.9 - 3.6 K/UL    ABS. MONOCYTES 1.2 0.05 - 1.2 K/UL    ABS. EOSINOPHILS 0.3 0.0 - 0.4 K/UL    ABS. BASOPHILS 0.1 0.0 - 0.1 K/UL    ABS. IMM. GRANS. 0.2 (H) 0.00 - 0.04 K/UL    DF AUTOMATED     MAGNESIUM    Collection Time: 11/18/22  4:00 AM   Result Value Ref Range    Magnesium 2.4 1.6 - 2.6 mg/dL   PHOSPHORUS    Collection Time: 11/18/22  4:00 AM   Result Value Ref Range    Phosphorus 5.7 (H) 2.5 - 4.9 MG/DL   GLUCOSE, POC    Collection Time: 11/18/22  8:14 AM   Result Value Ref Range    Glucose (POC) 114 (H) 70 - 110 mg/dL        XR (Most Recent). Results from [place] encounter on 11/14/22    XR CHEST PORT    Narrative  EXAM: XR CHEST PORT    INDICATION: chest pain    COMPARISON: 10/21/2022. FINDINGS: A single view of the chest demonstrates chronic linear scarring  without change. No new lung findings. Stable elevated right hemidiaphragm. .  Stable cardiac silhouette. . No acute bone findings. .    Impression  No acute findings or interval change. CT (Most Recent) Results from Hospital Encounter encounter on 11/14/22    CTA CHEST ABD PELV W WO CONT    Narrative  CT angiogram aorta. CT chest, abdomen and pelvis without and with IV contrast.    HISTORY: Chest pain and back pain with hypotension.     All CT scans at this facility are performed using dose optimization technique as  appropriate to a performed exam, to include automated exposure control,  adjustment of the mA and/or kV according to patient size (including appropriate  matching for site specific examination) or use of iterative reconstruction  technique. Dialysis patient, to get dialysis the next morning. Axial CT images obtained. Sagittal and coronal MIP images of the aorta were  generated. Dedicated 3-D images of the aorta and its branches also generated on  a dedicated workstation. Sagittal and coronal images of abdomen and pelvis also  generated. CT angiogram aorta: Noncontrast study shows no intramural hematoma. Contrast  study shows normal caliber throughout. No aortic dissection. Moderate  atherosclerotic calcification present. No focal aneurysm. Some degree of  stenosis due to plaques in the celiac and SMA origins. PATRICIO is occluded, also  obscured by motion artifacts. Stenotic renal arteries. Iliac arteries are within  normal caliber. Minimal ectasia of the bifurcation. CT CHEST: No pulmonary infiltrate or consolidation. No pleural effusion or  pneumothorax. No mediastinal or hilar mass. No cardiomegaly or pericardial  effusion. Corticated calcification noted. No central pulmonary embolism. CT ABDOMEN/PELVIS: Liver and spleen unremarkable. Cholecystectomy. Small hiatal  hernia. Gastric surgery. Pancreas grossly unremarkable. No adrenal mass. Atrophic kidneys with cystic lesions. Double J ureteral stent on the right. No  surrounding inflammation. Small bowel and colon not distended. No extraluminal inflammation. Suspect  scattered colonic diverticulosis. No ascites or free air. Mild subcutaneous edema throughout. Small amount of excreted contrast material  in the decompressed bladder. Uterus unremarkable. No pelvic mass. Degenerative disease in the thoracolumbar spine. Old left inferior pubic ramal  fracture. Impression  1.  No aortic aneurysm or dissection. Moderately advanced atherosclerotic  disease. 2. No acute abnormalities in the chest, abdomen or pelvis. ECHO No results found for this or any previous visit. EKG No results found for this or any previous visit. I have discussed Ms. Claire Stern case with my attending who agrees with the plan of care.     Damien Potter DO , PGY-1   [facility]   November 18, 2022, 1:52 PM

## 2022-11-18 NOTE — INTERDISCIPLINARY ROUNDS
[facility]  Pulmonary, Critical Care, and Sleep Medicine  Interdisciplinary and Ventilator Weaning Rounds    Patient discussed in morning walking rounds and interdisciplinary rounds. ICU day: 11/15/22     Overnight events:   Sun-downed overnight  Heart block persists, HR 60s    Assessments and best practice:  Ventilator  N/A     Sedation  N/A   Other pertinent drips  levophed and dopamine , NS @ 50 ml/hr   Lines noted  Femoral CVL , PIV   Critical labs assessed  Yes  Antibiotics  Vancomycin and Cefepime  Medications reviewed  Yes  Pending imaging  none  Pending send out labs  No  Pending Procedures  Paracentesis? Glycemic control  SSI   Stress ulcer prophylaxis. Pepcid  DVT prophylaxis. SQH  Need for Lines, chua assessed. Yes  Restraint Reevaluation     N/A      Family contact/MPOA: Lauro Blanchard (son)   Family updated: To be updated by ICU staff     Palliative consult within 3 days of admission to ICU-  Ethics Guidance: 21 days      Daily Plans:  Cont to [email or web address] and Dopamine @5 as tolerated   Restart minodrine  Scheduled dialysis, f/u and discuss with paliative/nephrology since pt did not want anymore dialysis.   Continue broad spectrum abx cefipime  Palliative follow up    LIZA time 10 minutes        Mary Cummings PA-C  11/18/22  Pulmonary, Critical Care Medicine  [facility]

## 2022-11-18 NOTE — PROGRESS NOTES
Problem: Pain  Goal: *Control of Pain  Outcome: Progressing Towards Goal     Problem: Hypotension  Goal: *Fluid volume balance  Outcome: Progressing Towards Goal  Goal: *Labs within defined limits  Outcome: Progressing Towards Goal     Problem: Patient Education: Go to Patient Education Activity  Goal: Patient/Family Education  Outcome: Progressing Towards Goal     Problem: Pressure Injury - Risk of  Goal: *Prevention of pressure injury  Description: Document Giovany Scale and appropriate interventions in the flowsheet.   Outcome: Progressing Towards Goal  Note: Pressure Injury Interventions:  Sensory Interventions: Assess changes in LOC, Assess need for specialty bed, Avoid rigorous massage over bony prominences    Moisture Interventions: Absorbent underpads, Apply protective barrier, creams and emollients, Assess need for specialty bed, Check for incontinence Q2 hours and as needed    Activity Interventions: Pressure redistribution bed/mattress(bed type)    Mobility Interventions: Assess need for specialty bed, Float heels, Pressure redistribution bed/mattress (bed type)    Nutrition Interventions: Discuss nutritional consult with provider, Document food/fluid/supplement intake    Friction and Shear Interventions: HOB 30 degrees or less, Foam dressings/transparent film/skin sealants         Malathi Root RN

## 2022-11-18 NOTE — PROGRESS NOTES
RENAL DAILY PROGRESS NOTE              Subjective:       Complaint:   Overnight events noted  no nausea, vomiting, chest pain, short of breath, cough, seizure. IMPRESSION:   ESRD   Hypotension with symptomatic bradycardia? Vs related to bacteremia  Bacteremia in setting of recent stent exchange(for hydronephrosis) ? contamination. Hx Falls, Pelvic fx  Hx A fib  Chronic hypotension on Midodrine as OP. Currently held due to bradycardia  Secondary hyperparathyroidism  ACD  Hx a fib now with bradycardia ?due to rate controlling agents like metoprolol,digoxin ?pacer consideration  UTI? Diabetes  Chronic nausea,dizziness   PLAN:    C/w dopamine and levophed per ICU team  Patient wishes to stop dialysis as inpatient. Will honor this. No acute indication eitherway. We will continue to follow and support her wishes.   Abx and rest of care per ICU Team  D/w ICU            Current Facility-Administered Medications   Medication Dose Route Frequency    midodrine (PROAMATINE) tablet 10 mg  10 mg Oral TID WITH MEALS    bisacodyL (DULCOLAX) suppository 10 mg  10 mg Rectal DAILY PRN    morphine IR (MS IR) tablet 7.5 mg  7.5 mg Oral DAILY PRN    morphine (ROXANOL) 20 mg/mL concentrated solution 2.6-5 mg  2.6-5 mg Oral Q4H PRN    LORazepam (INTENSOL) 2 mg/mL oral concentrate 0.5 mg  0.5 mg Oral Q4H PRN    DOPamine (INTROPIN) 800 mg in dextrose 5% 500 mL infusion  5-20 mcg/kg/min IntraVENous TITRATE    epoetin caitlin-epbx (RETACRIT) injection 8,000 Units  8,000 Units SubCUTAneous Q TUE, THU & SAT    insulin lispro (HUMALOG) injection   SubCUTAneous AC&HS    glucose chewable tablet 16 g  4 Tablet Oral PRN    glucagon (GLUCAGEN) injection 1 mg  1 mg IntraMUSCular PRN    dextrose 10% infusion 0-250 mL  0-250 mL IntraVENous PRN    aspirin chewable tablet 81 mg  81 mg Oral DAILY    clopidogreL (PLAVIX) tablet 75 mg  75 mg Oral DAILY    cefepime (MAXIPIME) 1 g in 0.9% sodium chloride (MBP/ADV) 50 mL MBP  1 g IntraVENous Q24H NOREPINephrine (LEVOPHED) 8 mg in 5% dextrose 250mL (32 mcg/mL) infusion  0.5-50 mcg/min IntraVENous TITRATE    sodium chloride (NS) flush 5-40 mL  5-40 mL IntraVENous Q8H    sodium chloride (NS) flush 5-40 mL  5-40 mL IntraVENous PRN    acetaminophen (TYLENOL) tablet 650 mg  650 mg Oral Q6H PRN    Or    acetaminophen (TYLENOL) suppository 650 mg  650 mg Rectal Q6H PRN    polyethylene glycol (MIRALAX) packet 17 g  17 g Oral DAILY PRN    ondansetron (ZOFRAN ODT) tablet 4 mg  4 mg Oral Q8H PRN    Or    ondansetron (ZOFRAN) injection 4 mg  4 mg IntraVENous Q6H PRN    heparin (porcine) injection 5,000 Units  5,000 Units SubCUTAneous Q8H    DULoxetine (CYMBALTA) capsule 20 mg  20 mg Oral DAILY    famotidine (PF) (PEPCID) 20 mg in 0.9% sodium chloride 10 mL injection  20 mg IntraVENous S91G    folic acid (FOLVITE) tablet 1 mg  1 mg Oral DAILY       Review of Symptoms: comprehensive ROS negative except above.    Objective:   Patient Vitals for the past 24 hrs:   Temp Pulse Resp BP SpO2   11/18/22 1152 -- 67 -- -- --   11/18/22 1030 -- 66 16 (!) 128/47 100 %   11/18/22 1000 -- 69 18 (!) 116/49 (!) 89 %   11/18/22 0930 -- 71 21 (!) 124/47 93 %   11/18/22 0900 -- 78 23 (!) 135/44 97 %   11/18/22 0830 -- 73 21 -- 96 %   11/18/22 0806 -- 69 -- (!) 98/48 --   11/18/22 0800 98.4 °F (36.9 °C) 70 13 (!) 98/48 99 %   11/18/22 0730 -- 69 19 -- 90 %   11/18/22 0700 -- 71 22 (!) 98/48 97 %   11/18/22 0503 -- 64 12 (!) 89/54 98 %   11/18/22 0430 -- 68 20 103/83 94 %   11/18/22 0400 98.7 °F (37.1 °C) 67 18 (!) 83/66 (!) 73 %   11/18/22 0330 -- 67 20 106/78 --   11/18/22 0200 -- 67 17 (!) 101/45 100 %   11/18/22 0100 -- 69 18 (!) 103/50 100 %   11/18/22 0000 98.5 °F (36.9 °C) 69 23 (!) 107/43 94 %   11/17/22 2300 -- 65 21 99/64 98 %   11/17/22 2200 -- 65 25 -- 100 %   11/17/22 2100 -- 63 14 (!) 133/49 93 %   11/17/22 2000 98.5 °F (36.9 °C) 62 21 (!) 121/52 93 %   11/17/22 1648 -- 72 -- -- --   11/17/22 1645 -- 68 21 (!) 92/58 (!) 81 % 11/17/22 1630 -- 69 25 (!) 130/55 95 %   11/17/22 1615 -- 67 26 110/73 95 %   11/17/22 1600 -- 67 24 (!) 129/37 96 %   11/17/22 1545 -- 66 26 (!) 113/101 96 %   11/17/22 1530 -- 68 25 -- (!) 89 %   11/17/22 1515 -- 65 14 (!) 108/38 94 %   11/17/22 1500 -- 69 23 (!) 120/40 90 %   11/17/22 1445 -- 65 25 (!) 106/39 98 %   11/17/22 1430 -- 69 22 -- 92 %   11/17/22 1415 -- 68 22 -- 91 %   11/17/22 1400 -- 68 23 -- 93 %   11/17/22 1345 -- 70 18 -- 91 %   11/17/22 1330 -- 68 23 (!) 132/50 94 %          Weight change:      11/16 1901 - 11/18 0700  In: 1020.2 [I.V.:1020.2]  Out: -     Intake/Output Summary (Last 24 hours) at 11/18/2022 1318  Last data filed at 11/18/2022 0800  Gross per 24 hour   Intake 982.1 ml   Output --   Net 982.1 ml       Physical Exam:   General: comfortable, no acute distress   HEENT sclera anicteric, supple neck, no thyromegaly  CVS: S1S2 heard,  no rub  RS: + air entry b/l,   Abd: Soft, Non tender, Not distended, Positive bowel sounds, no organomegaly, no CVA / supra pubic tenderness  Neuro: non focal, awake, alert , CN II-XII are grossly intact  Extrm: no edema, no cyanosis, clubbing   Skin: no visible  Rash  Musculoskeletal: No gross joints or bone deformities         Data Review:     LABS:   Hematology:   Recent Labs     11/18/22  0400 11/17/22  0630 11/16/22  0238   WBC 11.3 11.4 11.7   HGB 8.0* 7.9* 8.7*   HCT 24.8* 24.2* 26.7*       Chemistry:   Recent Labs     11/18/22  0400 11/17/22  0630 11/16/22  0238   BUN 32* 27* 23*   CREA 9.62* 8.52* 7.47*   CA 8.3* 8.3* 8.7   K 4.2 3.9 3.7   * 133* 136    100 100   CO2 20* 23 27   PHOS 5.7* 4.9 4.7   * 175* 152*              Procedures/imaging: see electronic medical records for all procedures, Xrays and details which were not copied into this note but were reviewed prior to creation of Plan          Assessment & Plan:       See above      Abdi Armstrong MD  11/18/2022

## 2022-11-18 NOTE — PROGRESS NOTES
Palliative Medicine    Supportive visit made to patient. Patient resting comfortably in bed, awake, alert and oriented. Pleasant and talkative, wanting this writer to stay and visit. Reassured patient that the ICU staff is available if she needs anything. Continues to state she is a \"coward\" as she remains undecided about her treatment plan. She is clear that she does not want to continue with dialysis as an outpatient. Remains agreeable to go home with the support of hospice at her son's home. Call placed to son Lili Benson at [phone]. Medical update provided to son per Dr. Jennifer Sheets. Instructed on current treatment plan compared to stopping IV meds and transitioning to comfort measures. Son encouraged to visit with patient this weekend and discuss goals of care. Dr. Jennifer Sheets informed son that we will continue with IV medications with the plan of stopping them Monday 11/21/22 morning at 10am. Son was in agreement with this plan. Dr. Jennifer Sheets updated ICU MD.     Palliative team remains available to provide support to Ms. Stern and her family during this hospital stay.      CODE STATUS: DNR/DNI, LIMITED INTERVENTIONS, NO FEEDING TUBE    Gwen Smith RN  Palliative Medicine Inpatient RN  Palliative [facility] Line: [phone]

## 2022-11-18 NOTE — CONSULTS
Infectious Disease Consultation Note        Reason: evaluate for partially treated UTI, persistent hypotension, gram positive bacteremia    Current abx Prior abx   Cefepime since 11/15/22      Lines:       Assessment :    76 y.o. female with PMH esrd on HD MWF, hx of recurrent UTIs/stones s/p R nephrostomy tube (since 9/2018), HTN, DM II w/ neuropathy (last hgbA1C not known), glaucoma, GERD, CTS, OA in back and knees, c.diff (in 2016)  presented to ed on 11/15/22 for evaluation of weakness. E.coli bloodstream infection in 3/2019 (pan susceptible e.coli)     Acinetobacter UTI in 4/2019 -  (intermediately susceptible to ceftriaxone, ceftazidime, pip/tazo)     Hospitalization 2/2020 for  early hemorrhagic cystitis - Right PCNL. No hydronephrosis noted on Ct scan 2/20/20. +nt edema of bladder c/w cystitis. Urine culture 2/20/20-greater than 100,000 colonies of mixed gram-positive mike including 20,000 staph aureus- MSSA. S/P PCNL exchange on 2/21/20. Urology help appreciated. urine culture 4/13/20 positive for >100,000 colonies of strep. Bovis   urine cx 2/19/21- >100,000 colonies of pseudomonas     S/p ciprofloxacin 9/28-10/4       Hospitalization at [facility] October 2021 for septic shock due to partially treated uti, complicated UTI  Single positive blood cx for coagulase negative staph on 10/8 likely contamination    Negative repeat blood cultures 10/10. Urine culture  10/9/2021 enterococcus faecalis (vancomycin resistant), candida nivariensis     Now with persistent hypotension requiring pressors. 2+ bacteria on urine analysis, recent ureteral stent exchange 11/8/2022    Patient presents with highly complex clinical picture.   It is difficult to determine if persistent hypotension is due to partially treated urinary tract infection versus underlying cardiac etiology    Single positive blood culture for double colony types of coagulase-negative Staphylococcus on 11/14/2022-likely contaminant Patient was noted to have cloudy urine during recent cystoscopy/stent exchange on 11/8/22. Current urine cx obtained on 11/15/22 could be altered due to prior abx. UA 2 + bacteria, persistent hypotension requiring pressors,increasing procalcitonin concerning for partially treated gram negative UTI contributing to hypotension. Hence, will broaden antibiotic coverage to cover for this possibility. Patient has documented h/o ciprofloxacin allergy. She has tolerated ciprofloxacin on multiple occasions in the past. tolerated ciprofloxacin 10/2020, tolerated levofloxacin 6/2021         Recommendations   -Continue cefepime. Add levofloxacin  -Follow-up blood cultures 11/17  -Titrate pressors as tolerated  -Follow-up palliative care recommendation regarding goals of care  -Follow-up nephrology, cardiology recommendations  -Monitor CBC, temperature,, clinically  -Further recommendations based on the above test results, clinical course          Thank you for consultation request. Above plan was discussed in details with patient, ICu team and dr Candace Castaneda. Please call me if any further questions or concerns. Will continue to participate in the care of this patient. HPI:    76 y.o. female with PMH esrd on HD MWF, hx of recurrent UTIs/stones s/p R nephrostomy tube (since 9/2018), HTN, DM II w/ neuropathy (last hgbA1C not known), glaucoma, GERD, CTS, OA in back and knees, c.diff (in 2016)  presented to ed on 11/15/22 for evaluation of weakness. Is known to me from prior inpatient consultation at [facility]. Patient states that she has not been feeling good for several months. She has generalized weakness and occasional dizzy spells. She also had nausea, vomiting, diarrhea prior to admission after she was initiated on digoxin. In the emergency room she was noted to be hypotensive and bradycardic. She was initiated on Levophed/dopamine. She was given broad-spectrum antibiotics on admission.   Urine cultures collected subsequently which were negative. Was noted to have leukocytosis with a WBC count of 16 K which is now improved to 11 K. Patient remains persistently hypotensive and is currently on pressors. I have been consulted to evaluate for partially treated urinary tract infection. Patient denies any fever, chills at home. She most recently had ureteral stent exchange procedure on 11/8/2022. She did not receive any preoperative antibiotics. Review of records reveals that her preoperative urine culture on 11/1/2022 was negative. Some cloudy urine was noted intraoperatively. No urine culture sent on 11/8/2022. Patient did not receive any postoperative antibiotics. Past Medical History:   Diagnosis Date    Anemia in end-stage renal disease ([facility] [address]     Anticoagulated by anticoagulation treatment     On Apixaban    Chronic hypotension     On Midodrine    Chronic kidney disease     ESRD on HD MWF    Chronic pain     Closed fracture of left inferior pubic ramus, with routine healing, subsequent encounter 11/12/2021    Closed fracture of superior pubic ramus, left, with routine healing, subsequent encounter 11/12/2021    Closed nondisplaced fracture of anterior wall of left acetabulum with routine healing 11/12/2021    Depression     End-stage renal disease on hemodialysis ([facility] [address]     [facility]  Tel # [phone]    Gastroesophageal reflux disease     Glaucoma     History of acute pyelonephritis 02/20/2020    History of hydronephrosis 10/05/2021    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    Urine culture (collected 10/8/2021, resulted 10/14/2021) yielded growth of >100,000 colonies/ml of Enterococcus faecalis RESISTANT to Ciprofloxacin, Levofloxacin, Tetracycline and Vancomycin    History of kidney stones     History of recurrent urinary tract infection     History of sepsis 06/18/2021    History of septic shock 10/08/2021    History of urethral stricture History of urinary tract infection 10/08/2021    Urine culture (collected 10/8/2021, resulted 10/14/2021) yielded growth of >100,000 colonies/ml of Enterococcus faecalis RESISTANT to Ciprofloxacin, Levofloxacin, Tetracycline and Vancomycin    Hyperlipidemia     Hyperphosphatemia 11/14/2021    Hypothyroidism     Lung mass     Mononeuropathy     Involving ring finger of left hand    Need for prophylactic isolation 10/08/2021    Urine culture (collected 10/8/2021, resulted 10/14/2021) yielded growth of >100,000 colonies/ml of Enterococcus faecalis RESISTANT to Ciprofloxacin, Levofloxacin, Tetracycline and Vancomycin    Osteoporosis     Paroxysmal atrial fibrillation (HCC)     Secondary hyperparathyroidism of renal origin ([facility] [address]     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 ([facility] [address]     HbA1c (10/8/2021) = 4.6    Uric acid nephrolithiasis     Urinary incontinence        Past Surgical History:   Procedure Laterality Date    HX APPENDECTOMY      HX CHOLECYSTECTOMY      HX GASTRIC BYPASS      Gastric stapling    HX KNEE ARTHROSCOPY      HX UROLOGICAL      right PCN placement    HX UROLOGICAL  07/23/2018    RIGHT URETEROSCOPY WITH HOLMIUM LASER    IR EXCHANGE NEPHRO PERC LT SI  2/21/2020    IR EXCHANGE NEPHRO PERC RT SI  4/13/2020    IR EXCHANGE NEPHRO PERC RT SI  7/17/2020    IR NEPHROSTOMY PERC RT PLC CATH  SI  10/14/2020    IR NEPHROURETERAL PERC RT PLC CATH NEW ACCESS  SI  4/30/2020    MA INTRO CATH DIALYSIS CIRCUIT DX ANGRPH FLUOR S&I Left 9/24/2020    FISTULOGRAM LEFT/poss permanent catheter placement performed by Jackson Samayoa MD at [facility] CATH LAB    VASCULAR SURGERY PROCEDURE UNLIST      lef AVF       Current Discharge Medication List      Details   metoprolol tartrate (LOPRESSOR) 25 mg tablet TAKE 1/2 (ONE-HALF) TABLET BY MOUTH TWICE DAILY      digoxin (LANOXIN) 0.125 mg tablet TAKE 1 TABLET BY MOUTH EVERY OTHER DAY (NON DIALYSIS DAYS)      aspirin delayed-release 81 mg tablet Take 1 Tablet by mouth daily.  Qty: 30 Tablet, Refills: 5    Associated Diagnoses: Paroxysmal atrial fibrillation (HCC)      clopidogreL (Plavix) 75 mg tab Take 1 Tablet by mouth in the morning. Qty: 30 Tablet, Refills: 4      gabapentin (NEURONTIN) 100 mg capsule Take 1 Capsule by mouth nightly. Max Daily Amount: 100 mg. Indications: concern for back pain post dialysis  Qty: 30 Capsule, Refills: 0    Associated Diagnoses: Mononeuropathy      melatonin 5 mg tablet Take 5 mg by mouth nightly. HYDROmorphone (DILAUDID) 2 mg tablet Take 1 mg by mouth every eight (8) hours as needed for Pain. Take 1/2 tablet by mouth every 8 hours as needed for pain level of 5 out of 10 or greater      allopurinoL (ZYLOPRIM) 100 mg tablet Take 1 Tablet by mouth every Monday, Wednesday, Friday. [after hemodialysis]  Indications: treatment to prevent acute gout attack  Qty: 12 Tablet, Refills: 0    Associated Diagnoses: Uric acid nephrolithiasis      amiodarone (CORDARONE) 200 mg tablet Take 1 Tablet by mouth daily. Indications: prevention of recurrent atrial fibrillation  Qty: 30 Tablet, Refills: 0    Associated Diagnoses: Paroxysmal atrial fibrillation (HCC)      sevelamer carbonate (RENVELA) 800 mg tab tab Take 2 Tablets by mouth three (3) times daily (with meals). Indications: renal osteodystrophy with hyperphosphatemia  Qty: 180 Tablet, Refills: 0    Associated Diagnoses: Secondary hyperparathyroidism of renal origin ([facility] [address] End-stage renal disease on hemodialysis ([facility] [address] Hyperphosphatemia      acetaminophen (Tylenol Extra Strength) 500 mg tablet Take 1 Tablet by mouth every six (6) hours as needed for Pain. Qty: 30 Tablet, Refills: 0      meclizine (ANTIVERT) 25 mg tablet Take 25 mg by mouth three (3) times daily as needed for Dizziness. DULoxetine (CYMBALTA) 20 mg capsule Take 20 mg by mouth daily. estradioL (Estrace) 0.01 % (0.1 mg/gram) vaginal cream Apply a fingertip amount around the urethra three times a week.   Qty: 30 g, Refills: 3 biotin 1,000 mcg chew Take 1 Tab by mouth daily. cyanocobalamin 1,000 mcg tablet Take 1,000 mcg by mouth daily. lactobacillus sp. 50 billion cpu (BIO-K PLUS) 50 billion cell -375 mg cap capsule Take 1 Cap by mouth daily. Qty: 30 Cap, Refills: 2      ascorbic acid, vitamin C, (VITAMIN C) 500 mg tablet Take 500 mg by mouth daily. cholecalciferol (VITAMIN D3) (2,000 UNITS /50 MCG) cap capsule Take 2,000 Units by mouth two (2) times a day. latanoprost (XALATAN) 0.005 % ophthalmic solution Administer 1 Drop to both eyes nightly. One drop at bedtime      levothyroxine (SYNTHROID) 125 mcg tablet Take 125 mcg by mouth Daily (before breakfast). omeprazole (PRILOSEC) 20 mg capsule Take 20 mg by mouth daily. ondansetron (ZOFRAN ODT) 4 mg disintegrating tablet Take 4 mg by mouth every eight (8) hours as needed for Nausea or Vomiting. vit B Cmplx 3-FA-Vit C-Biotin (NEPHRO HOWIE RX) 1- mg-mg-mcg tablet Take 1 Tab by mouth daily.              Current Facility-Administered Medications   Medication Dose Route Frequency    midodrine (PROAMATINE) tablet 10 mg  10 mg Oral TID WITH MEALS    morphine IR (MS IR) tablet 7.5 mg  7.5 mg Oral DAILY PRN    morphine (ROXANOL) 20 mg/mL concentrated solution 2.6-5 mg  2.6-5 mg Oral Q4H PRN    LORazepam (INTENSOL) 2 mg/mL oral concentrate 0.5 mg  0.5 mg Oral Q4H PRN    DOPamine (INTROPIN) 800 mg in dextrose 5% 500 mL infusion  5-20 mcg/kg/min IntraVENous TITRATE    epoetin caitlin-epbx (RETACRIT) injection 8,000 Units  8,000 Units SubCUTAneous Q TUE, THU & SAT    insulin lispro (HUMALOG) injection   SubCUTAneous AC&HS    glucose chewable tablet 16 g  4 Tablet Oral PRN    glucagon (GLUCAGEN) injection 1 mg  1 mg IntraMUSCular PRN    dextrose 10% infusion 0-250 mL  0-250 mL IntraVENous PRN    aspirin chewable tablet 81 mg  81 mg Oral DAILY    clopidogreL (PLAVIX) tablet 75 mg  75 mg Oral DAILY    cefepime (MAXIPIME) 1 g in 0.9% sodium chloride (MBP/ADV) 50 mL MBP  1 g IntraVENous Q24H    NOREPINephrine (LEVOPHED) 8 mg in 5% dextrose 250mL (32 mcg/mL) infusion  0.5-50 mcg/min IntraVENous TITRATE    sodium chloride (NS) flush 5-40 mL  5-40 mL IntraVENous Q8H    sodium chloride (NS) flush 5-40 mL  5-40 mL IntraVENous PRN    acetaminophen (TYLENOL) tablet 650 mg  650 mg Oral Q6H PRN    Or    acetaminophen (TYLENOL) suppository 650 mg  650 mg Rectal Q6H PRN    polyethylene glycol (MIRALAX) packet 17 g  17 g Oral DAILY PRN    ondansetron (ZOFRAN ODT) tablet 4 mg  4 mg Oral Q8H PRN    Or    ondansetron (ZOFRAN) injection 4 mg  4 mg IntraVENous Q6H PRN    heparin (porcine) injection 5,000 Units  5,000 Units SubCUTAneous Q8H    DULoxetine (CYMBALTA) capsule 20 mg  20 mg Oral DAILY    famotidine (PF) (PEPCID) 20 mg in 0.9% sodium chloride 10 mL injection  20 mg IntraVENous Q24H    thiamine (B-1) 200 mg in 0.9% sodium chloride 50 mL IVPB  200 mg IntraVENous DAILY    folic acid (FOLVITE) tablet 1 mg  1 mg Oral DAILY       Allergies: Albumin, human 25 %;  Ciprofloxacin; Cyclopentolate; Iron sucrose; and Statins-hmg-coa reductase inhibitors    Family History   Problem Relation Age of Onset    Heart Surgery Sister      Social History     Socioeconomic History    Marital status: SINGLE     Spouse name: Not on file    Number of children: Not on file    Years of education: Not on file    Highest education level: Not on file   Occupational History    Not on file   Tobacco Use    Smoking status: Never    Smokeless tobacco: Never   Vaping Use    Vaping Use: Never used   Substance and Sexual Activity    Alcohol use: Never    Drug use: Never    Sexual activity: Not on file   Other Topics Concern    Not on file   Social History Narrative    Not on file     Social Determinants of Health     Financial Resource Strain: Not on file   Food Insecurity: Not on file   Transportation Needs: Not on file   Physical Activity: Not on file   Stress: Not on file   Social Connections: Not on file   Intimate Partner Violence: Not on file   Housing Stability: Not on file     Social History     Tobacco Use   Smoking Status Never   Smokeless Tobacco Never        Temp (24hrs), Av.6 °F (37 °C), Min:98.5 °F (36.9 °C), Max:98.7 °F (37.1 °C)    Visit Vitals  BP (!) 98/48   Pulse 69   Temp 98.7 °F (37.1 °C)   Resp 12   Ht 5' 2\" (1.575 m)   Wt 85 kg (187 lb 6.3 oz)   SpO2 98%   BMI 34.27 kg/m²       ROS: 12 point ROS obtained in details. Pertinent positives as mentioned in HPI,   otherwise negative    Physical Exam:    General: The patient appears comfortable in bed, tired  HEENT: NCAT, EOM intact; oral mucosa well perfused  Neck: supple, no LAD, no tenderness  CVS: RRR, S1, S2 normal, no  click, rub or gallop  Lungs: chest clear, no wheezing, rales, normal symmetric air entry    Abdomen: Soft, non-distended, right upper abdominal tenderness, no guarding, BS+, no organomegaly, no masses  Ext: No calf tenderness, peripheral pulses present, no significant edema.   Skin: significant ecchymosis over upper extremities, right lower abdomen at needle puncture sites  Neuro: No focal neurologic deficits or gross abnormalities  Psych: A&Ox4  Back: minimal right CVA tenderness    Labs: Results:   Chemistry Recent Labs     22  0400 22  0630 22  0238   * 175* 152*   * 133* 136   K 4.2 3.9 3.7    100 100   CO2 20* 23 27   BUN 32* 27* 23*   CREA 9.62* 8.52* 7.47*   CA 8.3* 8.3* 8.7   AGAP 13 10 9   BUCR 3* 3* 3*      CBC w/Diff Recent Labs     22  0400 22  0630 22  0238   WBC 11.3 11.4 11.7   RBC 2.35* 2.34* 2.58*   HGB 8.0* 7.9* 8.7*   HCT 24.8* 24.2* 26.7*    234 275   GRANS 67 77* 67   LYMPH 18* 11* 18*   EOS 3 2 1      Microbiology Recent Labs     22  1004 11/15/22  1800   CULT NO GROWTH AFTER 19 HOURS  NO GROWTH AFTER 19 HOURS No growth (<1,000 CFU/ML)          RADIOLOGY:    All available imaging studies/reports in [facility] for this admission were reviewed      Disclaimer: Sections of this note are dictated utilizing voice recognition software, which may have resulted in some phonetic based errors in grammar and contents. Even though attempts were made to correct all the mistakes, some may have been missed, and remained in the body of the document. If questions arise, please contact our department.     Dr. Kaur Joel, Infectious Disease Specialist  [phone]  November 18, 2022  8:17 AM

## 2022-11-19 NOTE — PROGRESS NOTES
Problem: Falls - Risk of  Goal: *Absence of Falls  Description: Document Dave Alford Fall Risk and appropriate interventions in the flowsheet.   Outcome: Progressing Towards Goal  Note: Fall Risk Interventions:       Mentation Interventions: Bed/chair exit alarm, Evaluate medications/consider consulting pharmacy, Reorient patient, Room close to nurse's station    Medication Interventions: Bed/chair exit alarm, Patient to call before getting OOB    Elimination Interventions: Call light in reach    History of Falls Interventions: Bed/chair exit alarm, Evaluate medications/consider consulting pharmacy         Problem: Patient Education: Go to Patient Education Activity  Goal: Patient/Family Education  Outcome: Progressing Towards Goal     Problem: Patient Education: Go to Patient Education Activity  Goal: Patient/Family Education  Outcome: Progressing Towards Goal     Problem: Hypotension  Goal: *Blood pressure within specified parameters  Outcome: Progressing Towards Goal  Goal: *Fluid volume balance  Outcome: Progressing Towards Goal  Goal: *Labs within defined limits  Outcome: Progressing Towards Goal     Problem: Patient Education: Go to Patient Education Activity  Goal: Patient/Family Education  Outcome: Progressing Towards Goal     Problem: Delirium Treatment  Goal: *Level of consciousness restored to baseline  Outcome: Progressing Towards Goal  Goal: *Level of environmental perceptions restored to baseline  Outcome: Progressing Towards Goal  Goal: *Sensory perception restored to baseline  Outcome: Progressing Towards Goal  Goal: *Emotional stability restored to baseline  Outcome: Progressing Towards Goal  Goal: *Functional assessment restored to baseline  Outcome: Progressing Towards Goal  Goal: *Absence of falls  Outcome: Progressing Towards Goal  Goal: *Will remain free of delirium, CAM Score negative  Outcome: Progressing Towards Goal  Goal: *Cognitive status will be restored to baseline  Outcome: Progressing Towards Goal  Goal: Interventions  Outcome: Progressing Towards Goal

## 2022-11-19 NOTE — PROGRESS NOTES
Bedside turnover given to [employer]. SBAR,MAR,ED summary given with updates R/T the night, a chance to ask questions was given. PT is on the cardiac tele monitor. Bed is in the lowest position with the wheels locked. Call bell and personal effects  are on the bedside table within reach. Double checked drips with oncoming RN.

## 2022-11-19 NOTE — PROGRESS NOTES
[facility]. Pulmonary, Critical Care, and Sleep Medicine    Name: Orquidea Grayson MRN: [id number]   : 1945 Hospital: [address] [place] [address] Dr   Date: 2022  Admission Date: 2022     Chart and notes reviewed. Data reviewed. I have evaluated all findings. [x]I have reviewed the flowsheet and previous days notes. []The patient is unable to give any meaningful history or review of systems because the patient is:  []Intubated []Sedated   []Unresponsive      []The patient is critically ill on      []Mechanical ventilation []Pressors   []BiPAP []         Interval HPI:  Patient is a 76 y.o. female w/ PMH of chronic hypotension, right hydonephrosis with stent, depression, afibb, DM2 with neuropathy, ESRD on HD presenting to [facility] with c/o pain, weakness, and SOB on . Patient reports 3 days of diarrhea PTA. Son reports poor intake for mos with associated n/v. Son reports worsening decline since starting digoxin. Patient is currently complaining of leg cramps/ pain and restlessness. Also complaining of presyncope/lightheaded for a few weeks. Subjective 22  Hospital Day:5   Vent Day:n/a  Overnight events:Pt inadvertently pulled her right femoral central line. Hemostasis achieved with moderate amount of blood. Pt became very agitated, restless, complaining that she cannot breathe,placed on nasal cannula. Placed left IJ CVL with difficulty after failed femoral attempt. Mentation/Activity: AAOx3, periods of agitation and confusion  Respiratory/ Secretions:Nasal cannula  Hemodynamics:Junctional rhythm, on Dopamine and Levophed  Urine output, bowel: Anuric, declines  HD treatment  Diet:Regular diet  Need for procedures:n/a              ROS:Pertinent items are noted in HPI. Events and notes from last 24 hours reviewed.      Patient Active Problem List   Diagnosis Code    History of acute pyelonephritis Z87.448    History of infection with vancomycin resistant Enterococcus (VRE) Z86.19    Anemia in end-stage renal disease ([facility]) N18.6, D63.1    Chronic hypotension I95.89    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 ([facility]) E11.22, N18.6    Secondary hyperparathyroidism of renal origin ([facility] N25.81    Gastroesophageal reflux disease K21.9    History of recurrent urinary tract infection Z87.440    History of sepsis Z86.19    End-stage renal disease on hemodialysis ([facility]) N18.6, Z99.2    History of hydronephrosis Z87.448    History of septic shock Z86.19    Anticoagulated by anticoagulation treatment Z79.01    Paroxysmal atrial fibrillation ([facility]) I48.0    Closed fracture of left inferior pubic ramus, with routine healing, subsequent encounter S32.592D    Closed nondisplaced fracture of anterior wall of left acetabulum with routine healing S32.415D    Closed fracture of superior pubic ramus, left, with routine healing, subsequent encounter S32.512D    Multiple closed pelvic fractures without disruption of pelvic ring ([facility] S32.82XA    Depression F32. A    History of urinary tract infection Z87.440    Need for prophylactic isolation Z41.8    Hyperlipidemia E78.5    Hypothyroidism E03.9    History of kidney stones Z87.442    Chronic pain G89.29    Lung mass R91.8    History of urethral stricture Z87.448    Uric acid nephrolithiasis N20.0    Urinary incontinence R32    Glaucoma H40.9    Hyperphosphatemia E83.39    Mononeuropathy G58.9    Hyperkalemia E87.5    Gross hematuria R31.0    Impaired mobility and ADLs Z74.09, Z78.9    Stricture of female urethra N35.92    ESRD on dialysis ([facility]) N18.6, Z99.2    SOB (shortness of breath) on exertion R06.02    A-fib ([facility]) I48.91    Presence of Watchman left atrial appendage closure device Z95.818    Ureteral stricture N13.5    Bradycardia R00.1    Hypotension I95.9       Vital Signs:  Visit Vitals  /62   Pulse 66   Temp 98 °F (36.7 °C)   Resp 17   Ht 5' 2\" (1.575 m)   Wt 85 kg (187 lb 6.3 oz)   SpO2 93%   BMI 34.27 kg/m² O2 Device: None (Room air)   O2 Flow Rate (L/min): 2 l/min   Temp (24hrs), Av.4 °F (36.9 °C), Min:98 °F (36.7 °C), Max:98.7 °F (37.1 °C)       Intake/Output:   Last shift:      No intake/output data recorded.   Last 3 shifts:  0701 -  1900  In: .5 [I.V.:.]  Out: -     Intake/Output Summary (Last 24 hours) at 2022 0050  Last data filed at 2022 1811  Gross per 24 hour   Intake 1048.24 ml   Output --   Net 1048.24 ml          Current Facility-Administered Medications   Medication Dose Route Frequency    midodrine (PROAMATINE) tablet 10 mg  10 mg Oral TID WITH MEALS    [START ON 2022] levoFLOXacin (LEVAQUIN) 500 mg in D5W IVPB  500 mg IntraVENous Q48H    melatonin tablet 5 mg  5 mg Oral QHS    DOPamine (INTROPIN) 800 mg in dextrose 5% 500 mL infusion  5-20 mcg/kg/min IntraVENous TITRATE    epoetin caitlin-epbx (RETACRIT) injection 8,000 Units  8,000 Units SubCUTAneous Q TUE, THU & SAT    insulin lispro (HUMALOG) injection   SubCUTAneous AC&HS    aspirin chewable tablet 81 mg  81 mg Oral DAILY    clopidogreL (PLAVIX) tablet 75 mg  75 mg Oral DAILY    cefepime (MAXIPIME) 1 g in 0.9% sodium chloride (MBP/ADV) 50 mL MBP  1 g IntraVENous Q24H    NOREPINephrine (LEVOPHED) 8 mg in 5% dextrose 250mL (32 mcg/mL) infusion  0.5-50 mcg/min IntraVENous TITRATE    sodium chloride (NS) flush 5-40 mL  5-40 mL IntraVENous Q8H    heparin (porcine) injection 5,000 Units  5,000 Units SubCUTAneous Q8H    DULoxetine (CYMBALTA) capsule 20 mg  20 mg Oral DAILY    famotidine (PF) (PEPCID) 20 mg in 0.9% sodium chloride 10 mL injection  20 mg IntraVENous M74F    folic acid (FOLVITE) tablet 1 mg  1 mg Oral DAILY         Telemetry: []Sinus []A-flutter []Paced    []A-fib []Multiple PVCs                  Physical Exam:      General: awake, alert, NAD   HEENT:  PERRL, EOMI, pink palpebral conjunctivae; mucosa moist  Resp:  Symmetrical chest expansion, no accessory muscle use; good airway entry; no rales/ wheezing/ rhonchi noted  CV:  S1, S2 present; regular rate and rhythm  GI:  Abdomen soft, non-tender; (+) active bowel sounds  Extremities:  tank radial/DP pulses intact, no tank LE edema/ cyanosis  Skin:  Warm, normal turgor/cap refill   Neurologic:  Alert, oriented x 3, moving all extremities spontaneously  Devices:  Femoral CVL , PIV REJ      DATA:  MAR reviewed and pertinent medications noted or modified as needed    Labs:  Recent Labs     11/18/22  0400 11/17/22  0630 11/16/22  0238   WBC 11.3 11.4 11.7   HGB 8.0* 7.9* 8.7*   HCT 24.8* 24.2* 26.7*    234 275     Recent Labs     11/18/22  0400 11/17/22  0630 11/16/22  0238   * 133* 136   K 4.2 3.9 3.7    100 100   CO2 20* 23 27   * 175* 152*   BUN 32* 27* 23*   CREA 9.62* 8.52* 7.47*   CA 8.3* 8.3* 8.7   MG 2.4 2.2 2.5   PHOS 5.7* 4.9 4.7     No results for input(s): PH, PCO2, PO2, HCO3, FIO2 in the last 72 hours. No results for input(s): FIO2I, IFO2, HCO3I, IHCO3, HCOPOC, PCO2I, PCOPOC, IPHI, PHI, PHPOC, PO2I, PO2POC in the last 72 hours. No lab exists for component: IPOC2    Imaging:  [x]   I have personally reviewed the patients radiographs and reports  XR Results (most recent):  XR Results (most recent):  Results from Hospital Encounter encounter on 11/14/22    XR CHEST PORT    Narrative  EXAM: XR CHEST PORT    INDICATION: chest pain    COMPARISON: 10/21/2022. FINDINGS: A single view of the chest demonstrates chronic linear scarring  without change. No new lung findings. Stable elevated right hemidiaphragm. .  Stable cardiac silhouette. . No acute bone findings. .    Impression  No acute findings or interval change. CT Results (most recent):  Results from Hospital Encounter encounter on 11/14/22    CTA CHEST ABD PELV W WO CONT    Narrative  CT angiogram aorta. CT chest, abdomen and pelvis without and with IV contrast.    HISTORY: Chest pain and back pain with hypotension.     All CT scans at this facility are performed using dose optimization technique as  appropriate to a performed exam, to include automated exposure control,  adjustment of the mA and/or kV according to patient size (including appropriate  matching for site specific examination) or use of iterative reconstruction  technique. Dialysis patient, to get dialysis the next morning. Axial CT images obtained. Sagittal and coronal MIP images of the aorta were  generated. Dedicated 3-D images of the aorta and its branches also generated on  a dedicated workstation. Sagittal and coronal images of abdomen and pelvis also  generated. CT angiogram aorta: Noncontrast study shows no intramural hematoma. Contrast  study shows normal caliber throughout. No aortic dissection. Moderate  atherosclerotic calcification present. No focal aneurysm. Some degree of  stenosis due to plaques in the celiac and SMA origins. PATRICIO is occluded, also  obscured by motion artifacts. Stenotic renal arteries. Iliac arteries are within  normal caliber. Minimal ectasia of the bifurcation. CT CHEST: No pulmonary infiltrate or consolidation. No pleural effusion or  pneumothorax. No mediastinal or hilar mass. No cardiomegaly or pericardial  effusion. Corticated calcification noted. No central pulmonary embolism. CT ABDOMEN/PELVIS: Liver and spleen unremarkable. Cholecystectomy. Small hiatal  hernia. Gastric surgery. Pancreas grossly unremarkable. No adrenal mass. Atrophic kidneys with cystic lesions. Double J ureteral stent on the right. No  surrounding inflammation. Small bowel and colon not distended. No extraluminal inflammation. Suspect  scattered colonic diverticulosis. No ascites or free air. Mild subcutaneous edema throughout. Small amount of excreted contrast material  in the decompressed bladder. Uterus unremarkable. No pelvic mass. Degenerative disease in the thoracolumbar spine. Old left inferior pubic ramal  fracture. Impression  1. No aortic aneurysm or dissection. Moderately advanced atherosclerotic  disease. 2. No acute abnormalities in the chest, abdomen or pelvis. 08/15/22    ECHO CATINA W OR WO CONTRAST 08/15/2022 8/15/2022    Interpretation Summary    Left Ventricle: Normal left ventricular systolic function with a visually estimated EF of 55 - 60%. Left Atrium: No left atrial appendage thrombus noted. Watchman well-seated with no residual jet around the device. Signed by: Kena Mendez MD on 8/15/2022 12:22 PM       IMPRESSION:   Acute on chronic hypotension (OP midodrine) requiring levophed and dopamine- likely secondary to complete heart block. Symptomatic bradycardia- since starting of digoxin in the last month  Hallucinations- due to morphine vs delirium, improving. Pt with confusion that is worse at nighttime.   Chronic nausea with poor PO intake, suspect gastroparesis, CT abd neg   Hx a fib s/p watchman procedure   ESRD, pt refusing dialysis  Hx hydronephrosis with chronic right ureteral stent, recent exchanged 11/8  DM2 with diabetic neuropathy, suspect DAN  Debility with functional weakness, needs assistance with ADLs  HX anxiety/depression            Patient Active Problem List   Diagnosis Code    History of acute pyelonephritis Z87.448    History of infection with vancomycin resistant Enterococcus (VRE) Z86.19    Anemia in end-stage renal disease ([facility]) N18.6, D63.1    Chronic hypotension I95.89    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) E11.22, N18.6    Secondary hyperparathyroidism of renal origin ([facility] N25.81    Gastroesophageal reflux disease K21.9    History of recurrent urinary tract infection Z87.440    History of sepsis Z86.19    End-stage renal disease on hemodialysis ([facility]) N18.6, Z99.2    History of hydronephrosis Z87.448    History of septic shock Z86.19    Anticoagulated by anticoagulation treatment Z79.01    Paroxysmal atrial fibrillation ([facility]) I48.0    Closed fracture of left inferior pubic ramus, with routine healing, subsequent encounter S32.592D    Closed nondisplaced fracture of anterior wall of left acetabulum with routine healing S32.415D    Closed fracture of superior pubic ramus, left, with routine healing, subsequent encounter S32.512D    Multiple closed pelvic fractures without disruption of pelvic ring ([address] S32.82XA    Depression F32. A    History of urinary tract infection Z87.440    Need for prophylactic isolation Z41.8    Hyperlipidemia E78.5    Hypothyroidism E03.9    History of kidney stones Z87.442    Chronic pain G89.29    Lung mass R91.8    History of urethral stricture Z87.448    Uric acid nephrolithiasis N20.0    Urinary incontinence R32    Glaucoma H40.9    Hyperphosphatemia E83.39    Mononeuropathy G58.9    Hyperkalemia E87.5    Gross hematuria R31.0    Impaired mobility and ADLs Z74.09, Z78.9    Stricture of female urethra N35.92    ESRD on dialysis ([facility]) N18.6, Z99.2    SOB (shortness of breath) on exertion R06.02    A-fib ([facility]) I48.91    Presence of Watchman left atrial appendage closure device Z95.818    Ureteral stricture N13.5    Bradycardia R00.1    Hypotension I95.9        RECOMMENDATIONS:   Neuro: Neuro checks per routine, Melatonin for sleep hygiene, Cymbalta, prn pain medication   Pulm: Supplemental O2 via NC, titrate flow for goal SPO2> 90%,  pulmonary hygiene care, Aspiration precautions, Keep HOB >30 degrees  CVS:  Actively titrate vasopressors aim systolic >41 mmHg. Titrate Dopamine for HR>65, titrate Levophed for SBP>90. Scheduled Midodrine, Cardiology following, recc pacemaker for bradycardia/complete heart block, however not consistent with pts goals of care. GI: SUP, Zofran PRN for N/V, Diet-Regular, consider GI consult for persistent n/v   Renal: Trend Renal indices, nephro following. Pt declines HD treatment. Hem/Onc: Monitor for s/o active bleeding. I/D: Sepsis bundle per hospital protocol. Blood and Urine cultures drawn and will be followed.  UCx negative, Resp viral panel (-), BC (+) x 1 GPC Lactic acid normalized. Procal low. Likely contaminant, BC redrawn; NGTD. Antibiotics:cefepime. Trend WBCs and temperature curve. Endocrine: Q6 glucoses, SSI. TSH normal   Metabolic:  Daily BMP, mag, phos. Trend lytes, replace as needed. Musc/Skin: no acute issues  DNR/DNI, signed POST w limited interventions   Plan to wean vasopressors this weekend and if pt cannot come off ggts, will be proceeding to comfort care on Monday  Discussed in interdisciplinary rounds     Best practice :    Glycemic control  IHI ICU bundles:   Central Line Bundle Followed     Stress ulcer prophylaxis. Pepcid  DVT prophylaxis. SQH  Need for Lines, chua assessed. Palliative care evaluation.               Geno Pascal NP   11/19/22  Pulmonary, Critical Care Medicine  [facility]

## 2022-11-19 NOTE — INTERDISCIPLINARY ROUNDS
[facility]  Pulmonary, Critical Care, and Sleep Medicine  Interdisciplinary and Ventilator Weaning Rounds    Patient discussed in morning walking rounds and interdisciplinary rounds. ICU day: 11/15/22     Overnight events:   Pulled CVL overnight - hypotension  On dopamine and levophed, labile BPs    Assessments and best practice:  Ventilator  None  Sedation  N/A   Other pertinent drips  levophed and dopamine , NS @ 50 ml/hr   Lines noted  L IJ CVL  Critical labs assessed  Yes  Antibiotics  Levaquin, cefepime  Medications reviewed  Yes  Pending imaging  none  Pending send out labs  No  Pending Procedures  None  Glycemic control  SSI   Stress ulcer prophylaxis. Pepcid  DVT prophylaxis. SQH  Need for Lines, chua assessed. Yes  Restraint Reevaluation   N/A    Family contact/MPOA: Sadi Bonner (son)   Family updated:  To be updated by ICU staff     Palliative consult within 3 days of admission to ICU-  Ethics Guidance: 21 days    Daily Plans:  Comfort measures Monday  Titrate pressors as able    LIZA time 15 minutes    NOVA Fitch  11/19/22  Pulmonary, Critical Care Medicine  [facility]

## 2022-11-19 NOTE — PROGRESS NOTES
RENAL DAILY PROGRESS NOTE              Subjective:       Complaint:   Overnight events noted  no nausea, vomiting, chest pain, short of breath, cough, seizure. IMPRESSION:   ESRD   Hypotension with symptomatic bradycardia? Vs related to bacteremia  Bacteremia in setting of recent stent exchange(for hydronephrosis) ? contamination. Hx Falls, Pelvic fx  Hx A fib  Chronic hypotension on Midodrine as OP. Currently held due to bradycardia  Secondary hyperparathyroidism  ACD  Hx a fib now with bradycardia ?due to rate controlling agents like metoprolol,digoxin ?pacer consideration  UTI? Diabetes  Chronic nausea,dizziness   PLAN:    C/w dopamine and levophed per ICU team  Patient wishes to stop dialysis as inpatient. Will honor this. No acute indication eitherway. To be transitioned to comfort care on Monday. We will follow peripherally           Current Facility-Administered Medications   Medication Dose Route Frequency    melatonin tablet 5 mg  5 mg Oral QHS    NOREPINephrine (LEVOPHED) 8 mg in 5% dextrose 250mL (32 mcg/mL) infusion  0.5-50 mcg/min IntraVENous TITRATE    midodrine (PROAMATINE) tablet 10 mg  10 mg Oral TID WITH MEALS    bisacodyL (DULCOLAX) suppository 10 mg  10 mg Rectal DAILY PRN    [START ON 11/20/2022] levoFLOXacin (LEVAQUIN) 500 mg in D5W IVPB  500 mg IntraVENous Q48H    morphine IR (MS IR) tablet 7.5 mg  7.5 mg Oral DAILY PRN    morphine (ROXANOL) 20 mg/mL concentrated solution 2.6-5 mg  2.6-5 mg Oral Q4H PRN    LORazepam (INTENSOL) 2 mg/mL oral concentrate 0.5 mg  0.5 mg Oral Q4H PRN    DOPamine (INTROPIN) 800 mg in dextrose 5% 500 mL infusion  5-20 mcg/kg/min IntraVENous TITRATE    epoetin caitlin-epbx (RETACRIT) injection 8,000 Units  8,000 Units SubCUTAneous Q TUE, THU & SAT    insulin lispro (HUMALOG) injection   SubCUTAneous AC&HS    glucose chewable tablet 16 g  4 Tablet Oral PRN    glucagon (GLUCAGEN) injection 1 mg  1 mg IntraMUSCular PRN    dextrose 10% infusion 0-250 mL 0-250 mL IntraVENous PRN    aspirin chewable tablet 81 mg  81 mg Oral DAILY    clopidogreL (PLAVIX) tablet 75 mg  75 mg Oral DAILY    cefepime (MAXIPIME) 1 g in 0.9% sodium chloride (MBP/ADV) 50 mL MBP  1 g IntraVENous Q24H    sodium chloride (NS) flush 5-40 mL  5-40 mL IntraVENous Q8H    sodium chloride (NS) flush 5-40 mL  5-40 mL IntraVENous PRN    acetaminophen (TYLENOL) tablet 650 mg  650 mg Oral Q6H PRN    Or    acetaminophen (TYLENOL) suppository 650 mg  650 mg Rectal Q6H PRN    polyethylene glycol (MIRALAX) packet 17 g  17 g Oral DAILY PRN    ondansetron (ZOFRAN ODT) tablet 4 mg  4 mg Oral Q8H PRN    Or    ondansetron (ZOFRAN) injection 4 mg  4 mg IntraVENous Q6H PRN    heparin (porcine) injection 5,000 Units  5,000 Units SubCUTAneous Q8H    DULoxetine (CYMBALTA) capsule 20 mg  20 mg Oral DAILY    famotidine (PF) (PEPCID) 20 mg in 0.9% sodium chloride 10 mL injection  20 mg IntraVENous D42M    folic acid (FOLVITE) tablet 1 mg  1 mg Oral DAILY       Review of Symptoms: comprehensive ROS negative except above.    Objective:   Patient Vitals for the past 24 hrs:   Temp Pulse Resp BP SpO2   11/19/22 1340 -- 70 -- (!) 102/39 --   11/19/22 1115 -- 61 16 (!) 73/56 99 %   11/19/22 1100 -- 61 15 (!) 78/46 98 %   11/19/22 1045 -- 60 16 (!) 83/60 98 %   11/19/22 1030 -- 68 20 97/70 96 %   11/19/22 1018 -- 67 -- 91/75 --   11/19/22 1015 -- 66 17 91/67 100 %   11/19/22 1000 -- 74 20 130/63 97 %   11/19/22 0900 -- 63 14 (!) 128/54 100 %   11/19/22 0800 98.4 °F (36.9 °C) 68 15 128/62 (!) 83 %   11/19/22 0745 -- 66 21 (!) 109/95 (!) 77 %   11/19/22 0730 -- 64 12 (!) 127/48 97 %   11/19/22 0715 -- (!) 59 12 (!) 135/45 98 %   11/19/22 0700 -- 65 12 (!) 130/58 98 %   11/19/22 0645 -- (!) 57 13 (!) 111/50 98 %   11/19/22 0630 -- (!) 57 13 (!) 113/43 97 %   11/19/22 0615 -- (!) 58 12 (!) 113/50 96 %   11/19/22 0600 -- (!) 59 15 (!) 93/49 97 %   11/19/22 0545 -- (!) 59 14 (!) 111/42 96 %   11/19/22 0530 -- (!) 58 13 (!) 114/41 98 % 11/19/22 0515 -- (!) 59 14 (!) 112/44 99 %   11/19/22 0500 -- (!) 58 13 (!) 115/38 96 %   11/19/22 0445 -- 64 20 (!) 109/54 (!) 89 %   11/19/22 0430 -- 62 15 (!) 119/44 96 %   11/19/22 0415 -- 62 15 (!) 121/42 (!) 87 %   11/19/22 0400 98.2 °F (36.8 °C) 60 17 (!) 110/40 91 %   11/19/22 0345 -- 63 20 (!) 112/40 --   11/19/22 0330 -- 66 22 113/82 93 %   11/19/22 0315 -- (!) 54 16 (!) 88/44 96 %   11/19/22 0300 -- 61 19 (!) 73/53 (!) 86 %   11/19/22 0245 -- 69 21 -- (!) 85 %   11/19/22 0230 -- 68 22 (!) 111/48 94 %   11/19/22 0215 -- 69 18 -- 96 %   11/19/22 0200 -- 69 (!) 31 92/66 97 %   11/19/22 0145 -- 71 22 -- (!) 88 %   11/19/22 0130 -- 67 15 (!) 74/48 94 %   11/19/22 0115 -- 68 14 -- 95 %   11/19/22 0100 -- 68 15 (!) 118/51 95 %   11/19/22 0045 -- 67 14 -- 95 %   11/19/22 0030 -- 67 15 (!) 123/52 95 %   11/19/22 0015 -- 67 16 (!) 80/51 95 %   11/19/22 0000 97.9 °F (36.6 °C) 69 17 (!) 115/52 94 %   11/18/22 2345 -- 69 16 -- 96 %   11/18/22 2330 -- 67 14 (!) 116/50 95 %   11/18/22 2315 -- 67 15 (!) 65/41 95 %   11/18/22 2300 -- 68 18 (!) 117/97 94 %   11/18/22 2245 -- 67 17 (!) 117/50 95 %   11/18/22 2230 -- 67 17 (!) 117/54 94 %   11/18/22 2215 -- 67 17 (!) 84/56 95 %   11/18/22 2200 -- 67 18 (!) 118/59 94 %   11/18/22 2145 -- 68 18 (!) 67/48 93 %   11/18/22 2130 -- 69 19 (!) 111/92 95 %   11/18/22 2115 -- 71 19 (!) 142/126 90 %   11/18/22 2100 -- 71 15 (!) 127/99 --   11/18/22 2045 -- 76 20 126/66 (!) 74 %   11/18/22 2030 -- 69 17 (!) 124/103 (!) 76 %   11/18/22 2019 -- 69 22 134/84 --   11/18/22 2015 -- 69 18 (!) 140/101 --   11/18/22 2001 -- -- -- (!) 91/42 --   11/18/22 2000 98 °F (36.7 °C) 65 15 (!) 65/36 96 %   11/18/22 1945 -- 65 22 (!) 121/35 (!) 72 %   11/18/22 1930 -- 67 20 (!) 129/108 94 %   11/18/22 1915 -- 78 15 (!) 114/39 94 %   11/18/22 1900 -- 63 -- -- 94 %   11/18/22 1745 -- 66 17 134/62 93 %   11/18/22 1730 -- 67 19 (!) 136/55 96 %   11/18/22 1715 -- 67 20 (!) 145/92 92 %   11/18/22 1700 -- 68 28 134/70 96 %   11/18/22 1645 -- 68 23 130/68 96 %   11/18/22 1630 -- 66 19 (!) 130/113 92 %   11/18/22 1615 -- 67 21 128/62 91 %   11/18/22 1608 -- 65 -- (!) 139/51 --   11/18/22 1600 -- 67 20 (!) 168/119 93 %   11/18/22 1545 -- 67 20 (!) 137/50 93 %   11/18/22 1530 -- 67 22 123/79 95 %   11/18/22 1515 -- 67 21 (!) 133/49 96 %   11/18/22 1500 -- 67 22 (!) 164/141 99 %   11/18/22 1445 -- 66 23 101/75 94 %   11/18/22 1430 -- 65 20 (!) 108/53 95 %   11/18/22 1415 -- 64 16 (!) 138/56 96 %   11/18/22 1400 -- 65 20 111/78 96 %          Weight change:      11/17 1901 - 11/19 0700  In: 1415.7 [I.V.:1415.7]  Out: 0     Intake/Output Summary (Last 24 hours) at 11/19/2022 1345  Last data filed at 11/19/2022 0400  Gross per 24 hour   Intake 878.07 ml   Output 0 ml   Net 878.07 ml       Physical Exam:   General: comfortable, no acute distress   HEENT sclera anicteric, supple neck, no thyromegaly  CVS: S1S2 heard,  no rub  RS: + air entry b/l,   Abd: Soft, Non tender, Not distended, Positive bowel sounds, no organomegaly, no CVA / supra pubic tenderness  Neuro: non focal, awake, alert , CN II-XII are grossly intact  Extrm: no edema, no cyanosis, clubbing   Skin: no visible  Rash  Musculoskeletal: No gross joints or bone deformities         Data Review:     LABS:   Hematology:   Recent Labs     11/19/22  1000 11/18/22  0400 11/17/22  0630   WBC 9.1 11.3 11.4   HGB 7.9* 8.0* 7.9*   HCT 24.3* 24.8* 24.2*       Chemistry:   Recent Labs     11/19/22  1000 11/18/22  0400 11/17/22  0630   BUN 42* 32* 27*   CREA 11.00* 9.62* 8.52*   CA 8.7 8.3* 8.3*   K 5.3 4.2 3.9   * 133* 133*   CL 98* 100 100   CO2 19* 20* 23   PHOS 6.6* 5.7* 4.9   * 123* 175*              Procedures/imaging: see electronic medical records for all procedures, Xrays and details which were not copied into this note but were reviewed prior to creation of Plan          Assessment & Plan:       See above      Gary Murray MD  11/19/2022

## 2022-11-19 NOTE — PROCEDURES
[employer] [facility]  [employer] Procedure Note With Ultrasound Guidance    Indication: Need for vasopressors    Risks, benefits, alternatives explained and consent obtained. Time out performed. Patient positioned in Trendelenburg. Central line Bundle:  Full sterile barrier precautions used. 7-Step Sterility Protocol followed. (cap, mask sterile gown, sterile gloves, large sterile sheet, hand hygiene, 2% chlorhexidine for cutaneous antisepsis)  5 mL 1% Lidocaine placed at insertion site. Using ultrasound guidance,   Left internal jugular vein cannulated x 1 attempt(s) utilizing the modified Seldinger technique. Position of guidewire confirmed in vein using ultrasound prior to dilating. Dilation completed once successfully. Guidewire was removed. Central venous catheter was placed without difficulty. moderate immediate complications encountered. Good blood return on all 3 ports. Catheter secured & Biopatch applied. Sterile Tegaderm placed. CXR pending. First assist:  Evelyn BHATT was present.     Almita Suarez NP  11/19/22  Pulmonary, Critical Care Medicine  [facility]

## 2022-11-19 NOTE — PROGRESS NOTES
0730am Bedside shift report received from Yifan dolan RN  [id number] Daughter in law at the bedside. 1000am Titrate Levo IV gtt to maintain MAP above 65.  1400pm- Pt is still confused. Pt needs constant reinforcement r/t keeping O2 on and NC on.  1455pm Pt is resting. 1900pm Bedside shift change report given to Shamika Saldana RN by Zaina Lorenzo RN. Report included the following information SBAR, Kardex, Intake/Output, MAR, Recent Results, Med Rec Status, Cardiac Rhythm NSR, and Alarm Parameters .

## 2022-11-19 NOTE — PROGRESS NOTES
[facility] [place] [address]  Admission History and Physical      Patient:    Benson Donohue      76 y.o. female            MRN:       [id number]                                                                                    Admission Date:         11/14/2022  Code status:                DNR    Benson Donohue is a 76y.o. year old female admitted for Hypotension [I95.9]  Bradycardia [R00.1]. ASSESSMENT AND PLAN  Problem List Items Addressed This Visit          Circulatory    Chronic hypotension (Chronic)    Hypotension       Urinary    ESRD on dialysis ([facility] [address]       Other    Presence of Watchman left atrial appendage closure device    Bradycardia     Other Visit Diagnoses       Symptomatic bradycardia    -  Primary    ESRD (end stage renal disease) on dialysis ([facility] [address]        Shock ([facility] [address]              Benson Donohue is a 76 y.o.  female with PMHx of chronic hypotension, ESRD on HD, R hydronephrosis w/ stent, a-fib, T2DM w/ neuropathy (controlled), hypothyroidism, and macrocytic anemia who is currently admitted to ICU for management of chronic hypotension and symptomatic bradycardia requiring vasopressor therapy. UA strongly suspicious for UTI, likely urosepsis. Acute on Chronic Hypotension  - Continues to require vasporessors (dopamine and levophed) d/t persistent hypotension; SBP improved to 100s and 90s, but DBP in 20s-50s overall  Plan:  - Continue dopamine and norepinephrine drips; titrate vasopressors w/ goal of SBP >90  - Holding antihypertensives/antiarrhythmics   - Holding midodrine  - Encourage hydration  - Plan is to wean off pressors over the weekend and if pt tolerates, can be discharged home on hospice. Otherwise will remain inpatient on comfort care. Hx A-Fib, Now with Symptomatic Bradycardia  - Presence of implanted watchman device, on plavix and aspirin (watchman in place as of august 2022)  - TSH wnl. Digoxin level 1.6.  Goal to \"wash\" all of digoxin out of system  - Cardiology potentially considering pacemaker but patient and her son have declined   - Outpatient metoprolol and digoxin have been discontinued per cardio   Plan:  - Continue dopamine, cardiology following  - Continuous cardiac monitoring  - Holding antihypertensives/antiarrhythmics   - 81 mg ASA and plavix 75 mg daily for watchman device    Urosepsis  - UA returned and was highly suggestive of UTI (+for bacteria, leuk est, nitrites, WBC); urine culture shows no growth   - Blood cultures collected 11/14:  1 of 2 samples grew gram+ cocci in clusters. Likely contaminated sample. - CXR not significant for acute cardiopulm process  - CT chest/abd/pelvis negative for acute pathology  - WBC decreasing; 8.9 > 16.4 > 11.7 > 11.4 w/ neutrophils 77  - LA normalized, 2.04 > 1.98. No longer trending. Plan:  - Continue NS @ 50 cc/hr  - Continue cefepime and vancomycin, to be dosed by pharmacy  - IV famotidine 20 mg daily to prevent stress ulcer  - Daily CBC, monitor for fever  - Following blood cultures    ESRD on HD, Hx hydronephrosis  - Typically MWF HD, last dialyzed earlier on 11/14 for about 3 hrs but felt weak and stopped, after which line fell out of her arm.  - Chronic macrocytic anemia; Hb 8.7>7.9 this AM   - Renal fxn continuing to worsen; Cr 8.52 with GFR 4 today  - Pt has decided to cease dialysis treatments in pursuit of comfort care and limited interventions. If she changes her mind, may need dialysis cath and to switch to CRRT. Plan:  - Nephrology is following. Appreciate their recommendations as pt transitions away from dialysis. - Monitor I/O   - Continue Retacrit Tues, Thurs, Sat  - Continue folate and thiamine  - Daily BMP, magnesium, and phos    A6NV complicated by diabetic neuropathy and gastroparesis  - Stable; last A1c 4.9.  Glucose 184 overnight.  - A1c < 3.8 on 11/15  Plan:  - Correctional insulin, POC glucose checks q6hrs  - Cymbalta for neuropathy   - Morphine IR 7.5 mg PO q6hr  - PRN tylenol for pain  - PRN Zofran for nausea 2/2 gastroparesis     Debility and Functional Weakness  - Pt w/ chronic diseases, quite debilitating and distressing to pt, including autonomic neuropathy, syncopal episodes, ESRD, chronic hypotension, and chronic nausea and vomiting. Feels weak and tired. - Pt has spoken with her son and agree on hospice care  Plan:  - Palliative care to continue to follow   - Prioritize comfort  - Will pursue hospice care on discharge    Hospital Delirium vs. Anxiety & Depression  - Experiencing some situational depression and dysphoria d/t her declining health, working with palliative care on goals of care. - Periodic states of hallucination and/or confusion   - Alert and oriented to self, date, location  Plan:  - Continue cymbalta 30 mg daily     Code: DNR  Diet: Adult regular  DVT/AC: SQH 5000 units Q8hr  Mobility: per protocol  Disposition: Critical care, ICU    Point of Contact (relationship): [employer], Son: [phone]    SUBJECTIVE:    Patient seen at bedside this AM. Daughter-in-law is present at bedside. Patient states that itching is still present on bilateral UE but has slightly improved with Benadryl. Notes continuing constipation but does not want any suppositories at the moment. No acute concerns at this time. PMHx, PSHx, SHx, FHx, MEDS, ALLERGIES:   Past Medical History:   Diagnosis Date    Anemia in end-stage renal disease ([facility] [address]     Anticoagulated by anticoagulation treatment     On Apixaban    Chronic hypotension     On Midodrine    Chronic kidney disease     ESRD on HD MWF    Chronic pain     Closed fracture of left inferior pubic ramus, with routine healing, subsequent encounter 11/12/2021    Closed fracture of superior pubic ramus, left, with routine healing, subsequent encounter 11/12/2021    Closed nondisplaced fracture of anterior wall of left acetabulum with routine healing 11/12/2021    Depression     End-stage renal disease on hemodialysis ([facility] [address]     HD at [address] [facility] on MWF.  Tel # [phone] Gastroesophageal reflux disease     Glaucoma     History of acute pyelonephritis 02/20/2020    History of hydronephrosis 10/05/2021    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    Urine culture (collected 10/8/2021, resulted 10/14/2021) yielded growth of >100,000 colonies/ml of Enterococcus faecalis RESISTANT to Ciprofloxacin, Levofloxacin, Tetracycline and Vancomycin    History of kidney stones     History of recurrent urinary tract infection     History of sepsis 06/18/2021    History of septic shock 10/08/2021    History of urethral stricture     History of urinary tract infection 10/08/2021    Urine culture (collected 10/8/2021, resulted 10/14/2021) yielded growth of >100,000 colonies/ml of Enterococcus faecalis RESISTANT to Ciprofloxacin, Levofloxacin, Tetracycline and Vancomycin    Hyperlipidemia     Hyperphosphatemia 11/14/2021    Hypothyroidism     Lung mass     Mononeuropathy     Involving ring finger of left hand    Need for prophylactic isolation 10/08/2021    Urine culture (collected 10/8/2021, resulted 10/14/2021) yielded growth of >100,000 colonies/ml of Enterococcus faecalis RESISTANT to Ciprofloxacin, Levofloxacin, Tetracycline and Vancomycin    Osteoporosis     Paroxysmal atrial fibrillation (HCC)     Secondary hyperparathyroidism of renal origin ([facility] [address]     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 ([facility] [address]     HbA1c (10/8/2021) = 4.6    Uric acid nephrolithiasis     Urinary incontinence       Past Surgical History:   Procedure Laterality Date    HX APPENDECTOMY      HX CHOLECYSTECTOMY      HX GASTRIC BYPASS      Gastric stapling    HX KNEE ARTHROSCOPY      HX UROLOGICAL      right PCN placement    HX UROLOGICAL  07/23/2018    RIGHT URETEROSCOPY WITH HOLMIUM LASER    IR EXCHANGE NEPHRO PERC LT SI  2/21/2020    IR EXCHANGE NEPHRO PERC RT SI  4/13/2020    IR EXCHANGE NEPHRO PERC RT SI  7/17/2020    IR NEPHROSTOMY PERC RT PLC CATH  SI  10/14/2020    IR NEPHROURETERAL PERC RT PLC CATH NEW ACCESS  SI  4/30/2020    [place] INTRO CATH DIALYSIS CIRCUIT DX ANGRPH FLUOR S&I Left 9/24/2020    FISTULOGRAM LEFT/poss permanent catheter placement performed by Tex Velasco MD at [facility] CATH LAB    VASCULAR SURGERY PROCEDURE UNLIST      lef AVF      Social History     Tobacco Use    Smoking status: Never    Smokeless tobacco: Never   Vaping Use    Vaping Use: Never used   Substance Use Topics    Alcohol use: Never    Drug use: Never     Family History   Problem Relation Age of Onset    Heart Surgery Sister      Allergies   Allergen Reactions    Albumin, Human 25 % Itching     Headache - severe migraine like, itchy eyes, runny nose    Ciprofloxacin Hives    Cyclopentolate Unknown (comments)    Iron Sucrose Diarrhea    Statins-Hmg-Coa Reductase Inhibitors Other (comments)     Body ache       Current Facility-Administered Medications   Medication Dose Route Frequency Provider Last Rate Last Admin    melatonin tablet 5 mg  5 mg Oral QHS Makenzie Saleem, GINO        NOREPINephrine (LEVOPHED) 8 mg in 5% dextrose 250mL (32 mcg/mL) infusion  0.5-50 mcg/min IntraVENous TITRATE Frederick Bustos MD 28.1 mL/hr at 11/19/22 1139 15 mcg/min at 11/19/22 1139    midodrine (PROAMATINE) tablet 10 mg  10 mg Oral TID WITH MEALS Floyd Haley MD   10 mg at 11/19/22 1340    bisacodyL (DULCOLAX) suppository 10 mg  10 mg Rectal DAILY PRN Ewelina Edwards ON 11/20/2022] levoFLOXacin (LEVAQUIN) 500 mg in D5W IVPB  500 mg IntraVENous Q48H Evangelist Guallpa MD        morphine IR (MS IR) tablet 7.5 mg  7.5 mg Oral DAILY PRN Ashlyn Conway PA-C   7.5 mg at 11/17/22 2321    morphine (ROXANOL) 20 mg/mL concentrated solution 2.6-5 mg  2.6-5 mg Oral Q4H PRN López Carrasco MD        LORazepam (INTENSOL) 2 mg/mL oral concentrate 0.5 mg  0.5 mg Oral Q4H PRN López Carrasco MD   0.5 mg at 11/18/22 1906    DOPamine (INTROPIN) 800 mg in dextrose 5% 500 mL infusion  5-20 mcg/kg/min IntraVENous TITRATE Ashlyn Hager PA-C 9.6 mL/hr at 11/18/22 1653 3 mcg/kg/min at 11/18/22 1653    epoetin caitlin-epbx (RETACRIT) injection 8,000 Units  8,000 Units SubCUTAneous Q TUE, THU & SAT Bernard Escobar MD   8,000 Units at 11/17/22 2141    insulin lispro (HUMALOG) injection   SubCUTAneous AC&HS Eva Mahajan MD   4 Units at 11/18/22 1152    glucose chewable tablet 16 g  4 Tablet Oral PRN Eva Mahajan MD        glucagon (GLUCAGEN) injection 1 mg  1 mg IntraMUSCular PRN Eva Mahajan MD        dextrose 10% infusion 0-250 mL  0-250 mL IntraVENous PRN Eva Mahajan MD        aspirin chewable tablet 81 mg  81 mg Oral DAILY Shilpa AVENDANO PA-C   81 mg at 11/19/22 1018    clopidogreL (PLAVIX) tablet 75 mg  75 mg Oral DAILY Shilpa AVENDANO PA-C   75 mg at 11/19/22 1019    cefepime (MAXIPIME) 1 g in 0.9% sodium chloride (MBP/ADV) 50 mL MBP  1 g IntraVENous Q24H Violeta Murray MD 12.5 mL/hr at 11/18/22 2028 1 g at 11/18/22 2028    sodium chloride (NS) flush 5-40 mL  5-40 mL IntraVENous Q8H SABA'Ashlyn Stoll PA-C   10 mL at 11/19/22 1350    sodium chloride (NS) flush 5-40 mL  5-40 mL IntraVENous PRN Ashlyn Hager PA-C        acetaminophen (TYLENOL) tablet 650 mg  650 mg Oral Q6H PRN Ashlyn Hager PA-C   650 mg at 11/18/22 1248    Or    acetaminophen (TYLENOL) suppository 650 mg  650 mg Rectal Q6H PRN Ashlyn Hager PA-C        polyethylene glycol (MIRALAX) packet 17 g  17 g Oral DAILY PRN Ashlyn Hager PA-C        ondansetron (ZOFRAN ODT) tablet 4 mg  4 mg Oral Q8H PRN O'Jerman, Ashlyn E, PA-C        Or    ondansetron (ZOFRAN) injection 4 mg  4 mg IntraVENous Q6H PRN Ashlyn Petit PA-C   4 mg at 11/15/22 2335    heparin (porcine) injection 5,000 Units  5,000 Units SubCUTAneous Q8H Ashlyn Petit PA-C   5,000 Units at 11/19/22 1342    DULoxetine (CYMBALTA) capsule 20 mg  20 mg Oral DAILY Ashlyn Petit PA-C   20 mg at 11/19/22 1018    famotidine (PF) (PEPCID) 20 mg in 0.9% sodium chloride 10 mL injection  20 mg IntraVENous Q24H Ashlyn Petit PA-C   20 mg at 11/20/95 2190    folic acid (FOLVITE) tablet 1 mg  1 mg Oral DAILY Ashlyn Petit PA-C   1 mg at 11/19/22 1341        ROS    (positive findings are in BOLD; negative findings are in regular font)  Constitutional: fevers, chills, appetite changes, weight changes, fatigue, confusion  HEENT: changes in vision, changes in hearing, sore throat, dysphagia  Cardiovascular: chest pain, palpitations, PND, orthopnea, edema  Pulmonary: SOB, cough, sputum production, wheezing, chest tightness  Gastrointestinal: abdominal pain, nausea/vomiting, diarrhea, constipation, melena, hematochezia  Genitourinary: dysuria, hesitation, dribbling, urgency, hematuria  Musculoskeletal: arthralgias, myalgias  Skin: rash, itching  Neurological: sensory changes, motor changes, headache  Psychiatric: mood changes  Endocrine: heat/cold intolerance  Heme: easy bruising/easy bleeding, LAD     OBJECTIVE:  Patient Vitals for the past 24 hrs:   BP Temp Pulse Resp SpO2   11/19/22 1345 (!) 92/50 97.8 °F (36.6 °C) 70 22 --   11/19/22 1340 (!) 102/39 -- 70 -- --   11/19/22 1330 (!) 102/39 -- 65 17 --   11/19/22 1315 124/87 -- 70 29 (!) 75 %   11/19/22 1310 -- -- 65 16 95 %   11/19/22 1300 112/78 -- 62 20 100 %   11/19/22 1250 (!) 116/50 -- 68 19 95 %   11/19/22 1240 105/86 -- 63 18 98 %   11/19/22 1230 (!) 122/59 -- 62 19 100 %   11/19/22 1220 (!) 117/53 -- 68 17 100 %   11/19/22 1210 121/82 -- 62 16 100 %   11/19/22 1200 (!) 140/103 98.4 °F (36.9 °C) 67 16 100 %   11/19/22 1115 (!) 73/56 -- 61 16 99 %   11/19/22 1100 (!) 78/46 -- 61 15 98 %   11/19/22 1045 (!) 83/60 -- 60 16 98 %   11/19/22 1030 97/70 -- 68 20 96 %   11/19/22 1018 91/75 -- 67 -- --   11/19/22 1015 91/67 -- 66 17 100 %   11/19/22 1000 130/63 -- 74 20 97 %   11/19/22 0900 (!) 128/54 -- 63 14 100 %   11/19/22 0800 128/62 98.4 °F (36.9 °C) 68 15 (!) 83 %   11/19/22 0745 (!) 109/95 -- 66 21 (!) 77 %   11/19/22 0730 (!) 127/48 -- 64 12 97 %   11/19/22 0715 (!) 135/45 -- (!) 59 12 98 %   11/19/22 0700 (!) 130/58 -- 65 12 98 %   11/19/22 0645 (!) 111/50 -- (!) 57 13 98 %   11/19/22 0630 (!) 113/43 -- (!) 57 13 97 %   11/19/22 0615 (!) 113/50 -- (!) 58 12 96 %   11/19/22 0600 (!) 93/49 -- (!) 59 15 97 %   11/19/22 0545 (!) 111/42 -- (!) 59 14 96 %   11/19/22 0530 (!) 114/41 -- (!) 58 13 98 %   11/19/22 0515 (!) 112/44 -- (!) 59 14 99 %   11/19/22 0500 (!) 115/38 -- (!) 58 13 96 %   11/19/22 0445 (!) 109/54 -- 64 20 (!) 89 %   11/19/22 0430 (!) 119/44 -- 62 15 96 %   11/19/22 0415 (!) 121/42 -- 62 15 (!) 87 %   11/19/22 0400 (!) 110/40 98.2 °F (36.8 °C) 60 17 91 %   11/19/22 0345 (!) 112/40 -- 63 20 --   11/19/22 0330 113/82 -- 66 22 93 %   11/19/22 0315 (!) 88/44 -- (!) 54 16 96 %   11/19/22 0300 (!) 73/53 -- 61 19 (!) 86 %   11/19/22 0245 -- -- 69 21 (!) 85 %   11/19/22 0230 (!) 111/48 -- 68 22 94 %   11/19/22 0215 -- -- 69 18 96 %   11/19/22 0200 92/66 -- 69 (!) 31 97 %   11/19/22 0145 -- -- 71 22 (!) 88 %   11/19/22 0130 (!) 74/48 -- 67 15 94 %   11/19/22 0115 -- -- 68 14 95 %   11/19/22 0100 (!) 118/51 -- 68 15 95 %   11/19/22 0045 -- -- 67 14 95 %   11/19/22 0030 (!) 123/52 -- 67 15 95 %   11/19/22 0015 (!) 80/51 -- 67 16 95 %   11/19/22 0000 (!) 115/52 97.9 °F (36.6 °C) 69 17 94 %   11/18/22 2345 -- -- 69 16 96 %   11/18/22 2330 (!) 116/50 -- 67 14 95 %   11/18/22 2315 (!) 65/41 -- 67 15 95 %   11/18/22 2300 (!) 117/97 -- 68 18 94 %   11/18/22 2245 (!) 117/50 -- 67 17 95 %   11/18/22 2230 (!) 117/54 -- 67 17 94 %   11/18/22 2215 (!) 84/56 -- 67 17 95 %   11/18/22 2200 (!) 118/59 -- 67 18 94 %   11/18/22 2145 (!) 67/48 -- 68 18 93 %   11/18/22 2130 (!) 111/92 -- 69 19 95 %   11/18/22 2115 (!) 142/126 -- 71 19 90 %   11/18/22 2100 (!) 127/99 -- 71 15 --   11/18/22 2045 126/66 -- 76 20 (!) 74 %   11/18/22 2030 (!) 124/103 -- 69 17 (!) 76 %   11/18/22 2019 134/84 -- 69 22 --   11/18/22 2015 (!) 140/101 -- 69 18 --   11/18/22 2001 (!) 91/42 -- -- -- --   11/18/22 2000 (!) 65/36 98 °F (36.7 °C) 65 15 96 %   11/18/22 1945 (!) 121/35 -- 65 22 (!) 72 %   11/18/22 1930 (!) 129/108 -- 67 20 94 %   11/18/22 1915 (!) 114/39 -- 78 15 94 %   11/18/22 1900 -- -- 63 -- 94 %   11/18/22 1745 134/62 -- 66 17 93 %   11/18/22 1730 (!) 136/55 -- 67 19 96 %   11/18/22 1715 (!) 145/92 -- 67 20 92 %   11/18/22 1700 134/70 -- 68 28 96 %   11/18/22 1645 130/68 -- 68 23 96 %   11/18/22 1630 (!) 130/113 -- 66 19 92 %   11/18/22 1615 128/62 -- 67 21 91 %   11/18/22 1608 (!) 139/51 -- 65 -- --   11/18/22 1600 (!) 168/119 -- 67 20 93 %   11/18/22 1545 (!) 137/50 -- 67 20 93 %   11/18/22 1530 123/79 -- 67 22 95 %   11/18/22 1515 (!) 133/49 -- 67 21 96 %   11/18/22 1500 (!) 164/141 -- 67 22 99 %   11/18/22 1445 101/75 -- 66 23 94 %   11/18/22 1430 (!) 108/53 -- 65 20 95 %       Body mass index is 34.27 kg/m². PHYSICAL EXAM:    General: The patient appears comfortable in bed, tired  HEENT: NCAT, EOM intact; oral mucosa well perfused  Neck: supple, no LAD, no tenderness  CVS: RRR, S1, S2 normal, no murmur, click, rub or gallop  Lungs: chest clear, no wheezing, rales, normal symmetric air entry    Abdomen: Soft, non-distended, non-TTP, BS+, no organomegaly, no masses  Ext: No calf tenderness, peripheral pulses present, no significant edema.   Skin: significant ecchymosis over upper extremities, particularly at needle puncture sites  Neuro: No focal neurologic deficits or gross abnormalities  Psych: A&Ox4     RECENT LABS AND IMAGING ON ADMISSION   Recent Results (from the past 24 hour(s))   GLUCOSE, POC    Collection Time: 11/18/22  5:15 PM   Result Value Ref Range    Glucose (POC) 122 (H) 70 - 110 mg/dL   GLUCOSE, POC    Collection Time: 11/18/22  9:19 PM   Result Value Ref Range    Glucose (POC) 126 (H) 70 - 110 mg/dL   CALCIUM, IONIZED    Collection Time: 11/19/22 10:00 AM   Result Value Ref Range    Ionized Calcium 1.12 (L) 1.15 - 1.33 MMOL/L   CBC WITH AUTOMATED DIFF Collection Time: 11/19/22 10:00 AM   Result Value Ref Range    WBC 9.1 4.6 - 13.2 K/uL    RBC 2.38 (L) 4.20 - 5.30 M/uL    HGB 7.9 (L) 12.0 - 16.0 g/dL    HCT 24.3 (L) 35.0 - 45.0 %    .1 (H) 78.0 - 100.0 FL    MCH 33.2 24.0 - 34.0 PG    MCHC 32.5 31.0 - 37.0 g/dL    RDW 16.3 (H) 11.6 - 14.5 %    PLATELET 691 014 - 749 K/uL    MPV 10.1 9.2 - 11.8 FL    NRBC 0.0 0  WBC    ABSOLUTE NRBC 0.00 0.00 - 0.01 K/uL    NEUTROPHILS 76 (H) 40 - 73 %    LYMPHOCYTES 10 (L) 21 - 52 %    MONOCYTES 10 3 - 10 %    EOSINOPHILS 2 0 - 5 %    BASOPHILS 1 0 - 2 %    IMMATURE GRANULOCYTES 2 (H) 0.0 - 0.5 %    ABS. NEUTROPHILS 6.9 1.8 - 8.0 K/UL    ABS. LYMPHOCYTES 0.9 0.9 - 3.6 K/UL    ABS. MONOCYTES 0.9 0.05 - 1.2 K/UL    ABS. EOSINOPHILS 0.2 0.0 - 0.4 K/UL    ABS. BASOPHILS 0.1 0.0 - 0.1 K/UL    ABS. IMM. GRANS. 0.1 (H) 0.00 - 0.04 K/UL    DF AUTOMATED     MAGNESIUM    Collection Time: 11/19/22 10:00 AM   Result Value Ref Range    Magnesium 2.5 1.6 - 2.6 mg/dL   METABOLIC PANEL, BASIC    Collection Time: 11/19/22 10:00 AM   Result Value Ref Range    Sodium 129 (L) 136 - 145 mmol/L    Potassium 5.3 3.5 - 5.5 mmol/L    Chloride 98 (L) 100 - 111 mmol/L    CO2 19 (L) 21 - 32 mmol/L    Anion gap 12 3.0 - 18 mmol/L    Glucose 106 (H) 74 - 99 mg/dL    BUN 42 (H) 7.0 - 18 MG/DL    Creatinine 11.00 (H) 0.6 - 1.3 MG/DL    BUN/Creatinine ratio 4 (L) 12 - 20      eGFR 3 (L) >60 ml/min/1.73m2    Calcium 8.7 8.5 - 10.1 MG/DL   PHOSPHORUS    Collection Time: 11/19/22 10:00 AM   Result Value Ref Range    Phosphorus 6.6 (H) 2.5 - 4.9 MG/DL   GLUCOSE, POC    Collection Time: 11/19/22 10:13 AM   Result Value Ref Range    Glucose (POC) 105 70 - 110 mg/dL   GLUCOSE, POC    Collection Time: 11/19/22 12:48 PM   Result Value Ref Range    Glucose (POC) 105 70 - 110 mg/dL        XR (Most Recent). Results from [place] encounter on 11/14/22    XR CHEST PORT    Narrative  Portable CXR:    HISTORY: Central line placement.     Comparison November 14, 2022. Left IJ catheter crosses midline, tip in the mid SVC. No left pneumothorax. Lungs are hypoinflated. Mild vascular congestion still suspected. No  consolidation. Eventration right hemidiaphragm, stable. Impression  No pneumothorax. Mild vascular congestion. Left IJ catheter tip in  the SVC. CT (Most Recent) Results from Hospital Encounter encounter on 11/14/22    CTA CHEST ABD PELV W WO CONT    Narrative  CT angiogram aorta. CT chest, abdomen and pelvis without and with IV contrast.    HISTORY: Chest pain and back pain with hypotension. All CT scans at this facility are performed using dose optimization technique as  appropriate to a performed exam, to include automated exposure control,  adjustment of the mA and/or kV according to patient size (including appropriate  matching for site specific examination) or use of iterative reconstruction  technique. Dialysis patient, to get dialysis the next morning. Axial CT images obtained. Sagittal and coronal MIP images of the aorta were  generated. Dedicated 3-D images of the aorta and its branches also generated on  a dedicated workstation. Sagittal and coronal images of abdomen and pelvis also  generated. CT angiogram aorta: Noncontrast study shows no intramural hematoma. Contrast  study shows normal caliber throughout. No aortic dissection. Moderate  atherosclerotic calcification present. No focal aneurysm. Some degree of  stenosis due to plaques in the celiac and SMA origins. PATRICIO is occluded, also  obscured by motion artifacts. Stenotic renal arteries. Iliac arteries are within  normal caliber. Minimal ectasia of the bifurcation. CT CHEST: No pulmonary infiltrate or consolidation. No pleural effusion or  pneumothorax. No mediastinal or hilar mass. No cardiomegaly or pericardial  effusion. Corticated calcification noted. No central pulmonary embolism. CT ABDOMEN/PELVIS: Liver and spleen unremarkable. Cholecystectomy.  Small hiatal  hernia. Gastric surgery. Pancreas grossly unremarkable. No adrenal mass. Atrophic kidneys with cystic lesions. Double J ureteral stent on the right. No  surrounding inflammation. Small bowel and colon not distended. No extraluminal inflammation. Suspect  scattered colonic diverticulosis. No ascites or free air. Mild subcutaneous edema throughout. Small amount of excreted contrast material  in the decompressed bladder. Uterus unremarkable. No pelvic mass. Degenerative disease in the thoracolumbar spine. Old left inferior pubic ramal  fracture. Impression  1. No aortic aneurysm or dissection. Moderately advanced atherosclerotic  disease. 2. No acute abnormalities in the chest, abdomen or pelvis. ECHO No results found for this or any previous visit. EKG No results found for this or any previous visit. I have discussed Ms. Clarissa Stern case with my attending who agrees with the plan of care.     Neeru Wolfe DO , PGY-1   [facility]   November 19, 2022, 1:52 PM

## 2022-11-20 NOTE — PROGRESS NOTES
[facility].  Admission History and Physical      Patient:    Carlos Arguello      76 y.o. female            MRN:       [id number]                                                                                    Admission Date:         11/14/2022  Code status:                DNR    Carlos Arguello is a 76y.o. year old female admitted for Hypotension [I95.9]  Bradycardia [R00.1]. ASSESSMENT AND PLAN  Problem List Items Addressed This Visit          Circulatory    Chronic hypotension (Chronic)    Hypotension       Urinary    ESRD on dialysis ([facility] [address]       Other    Presence of Watchman left atrial appendage closure device    Bradycardia     Other Visit Diagnoses       Symptomatic bradycardia    -  Primary    ESRD (end stage renal disease) on dialysis ([facility] [address]        Shock ([facility] [address]              Carlos Arguello is a 76 y.o.  female with PMHx of chronic hypotension, ESRD on HD, R hydronephrosis w/ stent, a-fib, T2DM w/ neuropathy (controlled), hypothyroidism, and macrocytic anemia who is currently admitted to ICU for management of chronic hypotension and symptomatic bradycardia requiring vasopressor therapy. UA strongly suspicious for UTI, likely urosepsis. Acute on Chronic Hypotension  - Continues to require vasporessors (dopamine and levophed) d/t persistent hypotension; SBP improved to 100s and 90s, but DBP in 20s-50s overall  - Was able to wean off of levophed this a.m. and remained on dopamine, but pressures eventually fell again to SBP ~70s w/ HR in 80s. Pt was asymptomatic. Put back on levophed with improvement in BP. Plan:  - Continue dopamine and norepinephrine drips; titrate vasopressors w/ goal of SBP >90  - Holding antihypertensives/antiarrhythmics   - Holding midodrine  - Encourage hydration  - Plan is to wean off pressors over the weekend and if pt tolerates, can be discharged home on hospice. Otherwise will remain inpatient on comfort care.      Hx A-Fib, Now with Symptomatic Bradycardia  - Presence of implanted watchman device, on plavix and aspirin (watchman in place as of august 2022)  - TSH wnl. Digoxin level 1.6. Goal to \"wash\" all of digoxin out of system  - Pt and son decline pacemaker t this time. - Outpatient metoprolol and digoxin have been discontinued per cardio   Plan:  - Continue dopamine, cardiology following  - Continuous cardiac monitoring  - Holding antihypertensives/antiarrhythmics   - 81 mg ASA and plavix 75 mg daily for watchman device    Urosepsis, Resolved  - UA returned and was highly suggestive of UTI (+for bacteria, leuk est, nitrites, WBC); urine culture shows no growth   - Blood cultures collected 11/14:  1 of 2 samples grew gram+ cocci in clusters. Likely contaminated sample. - CXR not significant for acute cardiopulm process  - CT chest/abd/pelvis negative for acute pathology  - WBC WNL. - LA normalized, 2.04 > 1.98. No longer trending. Plan:  - Continue cefepime 1 g q24 hr  - IV famotidine 20 mg daily to prevent stress ulcer  - Daily CBC, monitor for fever  - Following blood cultures    ESRD on HD, Hx hydronephrosis  - Typically MWF HD, last dialyzed earlier on 11/14 for about 3 hrs but felt weak and stopped, after which line fell out of her arm.  - Chronic macrocytic anemia; Hb 8.7>7.9>7.3 this AM   - Renal fxn continuing to worsen. Pt has decided to cease dialysis treatments in pursuit of comfort care and limited interventions. If she changes her mind, may need dialysis cath and to switch to CRRT. Plan:  - Nephrology is following. Appreciate their recommendations as pt transitions away from dialysis. - Monitor I/O   - Continue Retacrit Tues, Thurs, Sat  - Continue folate and thiamine  - Daily BMP, magnesium, and phos    D9HV complicated by diabetic neuropathy and gastroparesis  - Stable; last A1c 4.9.  Glucose 87 overnight.  - A1c < 3.8 on 11/15  Plan:  - Correctional insulin, POC glucose checks q6hrs  - Cymbalta for neuropathy   - Morphine IR 7.5 mg PO PRN q6hr +/- 2.6-5 mg morphine solution  - PRN tylenol for pain  - PRN Zofran for nausea 2/2 gastroparesis     Debility and Functional Weakness  - Pt w/ chronic diseases, quite debilitating and distressing to pt, including autonomic neuropathy, syncopal episodes, ESRD, chronic hypotension, and chronic nausea and vomiting. Feels weak and tired. - Pt has spoken with her son and agree on hospice care  Plan:  - Palliative care to continue to follow   - Prioritize comfort  - Will pursue hospice care on discharge    Hospital Delirium vs. Anxiety & Depression  - Experiencing some situational depression and dysphoria d/t her declining health, working with palliative care on goals of care. - Periodic states of hallucination and/or confusion   - Alert and oriented to self, date, location  Plan:  - Continue cymbalta 30 mg daily     Code: DNR  Diet: Adult regular  DVT/AC: SQH 5000 units Q8hr  Mobility: per protocol  Disposition: Critical care, ICU    Point of Contact (relationship): [employer], Son: [phone]    SUBJECTIVE:  Pt doing much better today. Slept well without mood disturbance. BP initially high enough to allow d/c of levophed, but dopamine alone proved not strong enough to maintain BP. Selene Obando into SPB 70s, HR 80s, no reflex tachycardia. Pt felt okay throughout this w/o CP, palp, dyspnea, or cough. Continues to complain of some constipation. Plan is still for comfort care at discharge. No additional concerns at this time. ROS AS ABOVE.     PMHx, PSHx, SHx, FHx, MEDS, ALLERGIES:   Past Medical History:   Diagnosis Date    Anemia in end-stage renal disease ([facility] [address]     Anticoagulated by anticoagulation treatment     On Apixaban    Chronic hypotension     On Midodrine    Chronic kidney disease     ESRD on HD MWF    Chronic pain     Closed fracture of left inferior pubic ramus, with routine healing, subsequent encounter 11/12/2021    Closed fracture of superior pubic ramus, left, with routine healing, subsequent encounter 11/12/2021 Closed nondisplaced fracture of anterior wall of left acetabulum with routine healing 11/12/2021    Depression     End-stage renal disease on hemodialysis ([address]     HD at [address] on [facility] on MWF.  Tel # [phone]    Gastroesophageal reflux disease     Glaucoma     History of acute pyelonephritis 02/20/2020    History of hydronephrosis 10/05/2021    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    Urine culture (collected 10/8/2021, resulted 10/14/2021) yielded growth of >100,000 colonies/ml of Enterococcus faecalis RESISTANT to Ciprofloxacin, Levofloxacin, Tetracycline and Vancomycin    History of kidney stones     History of recurrent urinary tract infection     History of sepsis 06/18/2021    History of septic shock 10/08/2021    History of urethral stricture     History of urinary tract infection 10/08/2021    Urine culture (collected 10/8/2021, resulted 10/14/2021) yielded growth of >100,000 colonies/ml of Enterococcus faecalis RESISTANT to Ciprofloxacin, Levofloxacin, Tetracycline and Vancomycin    Hyperlipidemia     Hyperphosphatemia 11/14/2021    Hypothyroidism     Lung mass     Mononeuropathy     Involving ring finger of left hand    Need for prophylactic isolation 10/08/2021    Urine culture (collected 10/8/2021, resulted 10/14/2021) yielded growth of >100,000 colonies/ml of Enterococcus faecalis RESISTANT to Ciprofloxacin, Levofloxacin, Tetracycline and Vancomycin    Osteoporosis     Paroxysmal atrial fibrillation ([address]     Secondary hyperparathyroidism of renal origin ([address]     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 ([address]     HbA1c (10/8/2021) = 4.6    Uric acid nephrolithiasis     Urinary incontinence       Past Surgical History:   Procedure Laterality Date    HX APPENDECTOMY      HX CHOLECYSTECTOMY      HX GASTRIC BYPASS      Gastric stapling    HX KNEE ARTHROSCOPY      HX UROLOGICAL      right PCN placement    HX UROLOGICAL  07/23/2018    RIGHT URETEROSCOPY WITH HOLMIUM LASER    IR EXCHANGE NEPHRO PERC LT SI  2/21/2020    IR EXCHANGE NEPHRO PERC RT SI  4/13/2020    IR EXCHANGE NEPHRO PERC RT SI  7/17/2020    IR NEPHROSTOMY PERC RT PLC CATH  SI  10/14/2020    IR NEPHROURETERAL PERC RT PLC CATH NEW ACCESS  SI  4/30/2020    [facility] INTRO CATH DIALYSIS CIRCUIT DX ANGRPH FLUOR S&I Left 9/24/2020    FISTULOGRAM LEFT/poss permanent catheter placement performed by Malika Davis MD at [facility] CATH LAB    VASCULAR SURGERY PROCEDURE UNLIST      lef AVF      Social History     Tobacco Use    Smoking status: Never    Smokeless tobacco: Never   Vaping Use    Vaping Use: Never used   Substance Use Topics    Alcohol use: Never    Drug use: Never     Family History   Problem Relation Age of Onset    Heart Surgery Sister      Allergies   Allergen Reactions    Albumin, Human 25 % Itching     Headache - severe migraine like, itchy eyes, runny nose    Ciprofloxacin Hives    Cyclopentolate Unknown (comments)    Iron Sucrose Diarrhea    Statins-Hmg-Coa Reductase Inhibitors Other (comments)     Body ache       Current Facility-Administered Medications   Medication Dose Route Frequency Provider Last Rate Last Admin    melatonin tablet 5 mg  5 mg Oral QHS CapilitanGrecia, NP   5 mg at 11/19/22 2136    NOREPINephrine (LEVOPHED) 8 mg in 5% dextrose 250mL (32 mcg/mL) infusion  0.5-50 mcg/min IntraVENous TITRATE Helen Carr MD 11.3 mL/hr at 11/20/22 0704 6 mcg/min at 11/20/22 0704    diphenhydrAMINE (BENADRYL) capsule 25 mg  25 mg Oral Q6H PRN Angeles Shaw NP   25 mg at 11/19/22 1908    midodrine (PROAMATINE) tablet 10 mg  10 mg Oral TID WITH MEALS Rob Caruso MD   10 mg at 11/19/22 1854    bisacodyL (DULCOLAX) suppository 10 mg  10 mg Rectal DAILY PRN Tan DOWNEY        levoFLOXacin (LEVAQUIN) 500 mg in D5W IVPB  500 mg IntraVENous Q48H Sari Cooper MD        morphine IR (MS IR) tablet 7.5 mg  7.5 mg Oral DAILY PRN Ashlyn Petit, PA-C   7.5 mg at 11/17/22 2321    morphine (ROXANOL) 20 mg/mL concentrated solution 2.6-5 mg  2.6-5 mg Oral Q4H PRN Edis Guevara MD        LORazepam (INTENSOL) 2 mg/mL oral concentrate 0.5 mg  0.5 mg Oral Q4H PRN Edis Guevara MD   0.5 mg at 11/20/22 0008    DOPamine (INTROPIN) 800 mg in dextrose 5% 500 mL infusion  5-20 mcg/kg/min IntraVENous TITRATE Ashlyn Elizabeth PA-C 9.6 mL/hr at 11/20/22 0705 3 mcg/kg/min at 11/20/22 0705    epoetin caitlin-epbx (RETACRIT) injection 8,000 Units  8,000 Units SubCUTAneous Q ADYE, THU & SAT Melyssa Garcia MD   8,000 Units at 11/19/22 2055    insulin lispro (HUMALOG) injection   SubCUTAneous AC&HS Kamari Benitez MD   4 Units at 11/18/22 1152    glucose chewable tablet 16 g  4 Tablet Oral PRN Kamari Benitez MD        glucagon (GLUCAGEN) injection 1 mg  1 mg IntraMUSCular PRN Kamari Benitez MD        dextrose 10% infusion 0-250 mL  0-250 mL IntraVENous PRN Kamari Benitez MD        aspirin chewable tablet 81 mg  81 mg Oral DAILY Art AVENDANO PA-C   81 mg at 11/19/22 1018    clopidogreL (PLAVIX) tablet 75 mg  75 mg Oral DAILY Art AVENDANO PA-C   75 mg at 11/19/22 1019    cefepime (MAXIPIME) 1 g in 0.9% sodium chloride (MBP/ADV) 50 mL MBP  1 g IntraVENous Q24H Violeta Murray MD 12.5 mL/hr at 11/19/22 2054 1 g at 11/19/22 2054    sodium chloride (NS) flush 5-40 mL  5-40 mL IntraVENous Q8H SABA'Ashlyn Stoll PA-C   10 mL at 11/20/22 3743    sodium chloride (NS) flush 5-40 mL  5-40 mL IntraVENous PRN Ashlyn Elizabeth PA-C        acetaminophen (TYLENOL) tablet 650 mg  650 mg Oral Q6H PRN Ashlyn Elizabeth PA-C   650 mg at 11/18/22 0719    Or    acetaminophen (TYLENOL) suppository 650 mg  650 mg Rectal Q6H PRN Ashlyn Elizabeth PA-C        polyethylene glycol (MIRALAX) packet 17 g  17 g Oral DAILY PRN Ashlyn Elizabeth PA-C        ondansetron (ZOFRAN ODT) tablet 4 mg  4 mg Oral Q8H PRN Ashlyn Petit PA-C        Or    ondansetron (ZOFRAN) injection 4 mg  4 mg IntraVENous Q6H PRN Alli Alcaraz PA-C   4 mg at 11/15/22 2335    heparin (porcine) injection 5,000 Units  5,000 Units SubCUTAneous Q8H Ashlyn Petit PA-C   5,000 Units at 11/20/22 8288    DULoxetine (CYMBALTA) capsule 20 mg  20 mg Oral DAILY Ashlyn Petit PA-C   20 mg at 11/19/22 1018    famotidine (PF) (PEPCID) 20 mg in 0.9% sodium chloride 10 mL injection  20 mg IntraVENous Q24H Ashlyn Petit PA-C   20 mg at 14/96/39 1046    folic acid (FOLVITE) tablet 1 mg  1 mg Oral DAILY Ashlyn Petit PA-C   1 mg at 11/19/22 1341             OBJECTIVE:  Patient Vitals for the past 24 hrs:   BP Temp Pulse Resp SpO2   11/20/22 0730 (!) 123/95 -- 62 22 (!) 77 %   11/20/22 0700 (!) 105/47 -- 60 13 98 %   11/20/22 0645 (!) 92/48 -- 60 12 98 %   11/20/22 0630 (!) 98/48 -- 61 12 97 %   11/20/22 0615 (!) 99/47 -- 61 12 98 %   11/20/22 0600 (!) 100/47 -- 62 12 98 %   11/20/22 0545 (!) 101/45 -- 63 12 98 %   11/20/22 0530 (!) 96/46 -- 62 12 96 %   11/20/22 0515 (!) 90/45 -- 62 13 96 %   11/20/22 0500 (!) 88/46 -- 63 13 98 %   11/20/22 0445 (!) 86/44 -- 62 12 94 %   11/20/22 0430 (!) 90/46 -- 61 12 95 %   11/20/22 0415 (!) 96/46 -- 64 14 96 %   11/20/22 0400 (!) 84/46 98 °F (36.7 °C) 63 15 97 %   11/20/22 0345 (!) 91/50 -- 64 12 95 %   11/20/22 0330 (!) 89/54 -- 62 19 97 %   11/20/22 0315 (!) 88/39 -- 66 16 (!) 66 %   11/20/22 0300 (!) 86/45 -- 66 16 95 %   11/20/22 0247 (!) 93/44 -- 66 17 95 %   11/20/22 0245 (!) 205/169 -- 67 16 93 %   11/20/22 0230 (!) 123/97 -- 67 16 98 %   11/20/22 0215 (!) 114/52 -- 67 16 99 %   11/20/22 0200 (!) 102/55 -- 68 14 96 %   11/20/22 0145 113/88 -- 68 17 97 %   11/20/22 0132 (!) 105/58 -- 69 21 93 %   11/20/22 0100 (!) 81/51 -- 68 11 95 %   11/20/22 0045 (!) 100/51 -- 66 18 94 %   11/20/22 0030 111/82 -- 69 18 98 %   11/20/22 0015 (!) 88/64 -- 68 14 (!) 77 %   11/20/22 0000 95/62 98 °F (36.7 °C) 71 16 (!) 80 %   11/19/22 2330 (!) 129/92 -- 67 16 96 %   11/19/22 2315 (!) 116/50 -- 65 12 96 %   11/19/22 2300 (!) 71/58 -- 65 17 95 %   11/19/22 2245 (!) 124/50 -- 65 13 95 %   11/19/22 2230 91/68 -- 65 16 96 %   11/19/22 2215 (!) 116/51 -- 65 14 96 %   11/19/22 2200 95/71 -- 65 16 97 %   11/19/22 2145 116/89 -- 65 16 95 %   11/19/22 2130 (!) 120/58 -- 64 13 95 %   11/19/22 2115 (!) 115/59 -- 65 14 97 %   11/19/22 2100 98/82 -- 65 15 97 %   11/19/22 2045 (!) 103/53 -- 63 16 98 %   11/19/22 2030 (!) 95/51 -- 65 13 96 %   11/19/22 2015 (!) 93/47 -- 61 18 100 %   11/19/22 2000 (!) 110/42 97.9 °F (36.6 °C) 61 15 97 %   11/19/22 1900 (!) 120/91 -- 65 19 100 %   11/19/22 1800 (!) 145/103 -- 69 19 98 %   11/19/22 1745 126/84 -- 69 20 96 %   11/19/22 1730 (!) 113/45 -- 67 12 93 %   11/19/22 1715 (!) 114/37 -- 66 15 95 %   11/19/22 1700 (!) 100/33 -- 68 13 95 %   11/19/22 1645 (!) 82/64 -- 67 14 97 %   11/19/22 1630 90/76 -- 67 14 98 %   11/19/22 1615 (!) 91/58 -- 68 17 94 %   11/19/22 1600 90/76 97.5 °F (36.4 °C) 68 17 98 %   11/19/22 1545 112/81 -- 69 18 97 %   11/19/22 1530 (!) 101/50 -- 69 17 95 %   11/19/22 1515 (!) 103/55 -- 68 21 95 %   11/19/22 1500 (!) 128/100 -- 64 26 98 %   11/19/22 1445 (!) 104/92 -- 67 16 99 %   11/19/22 1345 (!) 92/50 97.8 °F (36.6 °C) 70 22 --   11/19/22 1340 (!) 102/39 -- 70 -- --   11/19/22 1330 (!) 102/39 -- 65 17 --   11/19/22 1315 124/87 -- 70 29 (!) 75 %   11/19/22 1310 -- -- 65 16 95 %   11/19/22 1300 112/78 -- 62 20 100 %   11/19/22 1250 (!) 116/50 -- 68 19 95 %   11/19/22 1240 105/86 -- 63 18 98 %   11/19/22 1230 (!) 122/59 -- 62 19 100 %   11/19/22 1220 (!) 117/53 -- 68 17 100 %   11/19/22 1210 121/82 -- 62 16 100 %   11/19/22 1200 (!) 140/103 98.4 °F (36.9 °C) 67 16 100 %   11/19/22 1115 (!) 73/56 -- 61 16 99 %   11/19/22 1100 (!) 78/46 -- 61 15 98 %   11/19/22 1045 (!) 83/60 -- 60 16 98 %   11/19/22 1030 97/70 -- 68 20 96 %   11/19/22 1018 91/75 -- 67 -- --   11/19/22 1015 91/67 -- 66 17 100 %   11/19/22 1000 130/63 -- 74 20 97 %   11/19/22 0900 (!) 128/54 -- 63 14 100 %   11/19/22 0800 128/62 98.4 °F (36.9 °C) 68 15 (!) 83 %       Body mass index is 34.27 kg/m². PHYSICAL EXAM:    General: The patient appears comfortable in bed, tired  HEENT: NCAT, EOM intact; oral mucosa well perfused  Neck: supple, no LAD, no tenderness  CVS: RRR, 3/6 ejection murmur  Lungs: chest clear, no wheezing, rales, normal symmetric air entry    Abdomen: Soft, non-distended, non-TTP, BS+, no organomegaly, no masses  Ext: No calf tenderness, peripheral pulses present, no significant edema. Skin: significant ecchymosis over upper extremities, particularly at needle puncture sites  Neuro: No focal neurologic deficits or gross abnormalities  Psych: A&Ox3     RECENT LABS AND IMAGING ON ADMISSION   Recent Results (from the past 24 hour(s))   CALCIUM, IONIZED    Collection Time: 11/19/22 10:00 AM   Result Value Ref Range    Ionized Calcium 1.12 (L) 1.15 - 1.33 MMOL/L   CBC WITH AUTOMATED DIFF    Collection Time: 11/19/22 10:00 AM   Result Value Ref Range    WBC 9.1 4.6 - 13.2 K/uL    RBC 2.38 (L) 4.20 - 5.30 M/uL    HGB 7.9 (L) 12.0 - 16.0 g/dL    HCT 24.3 (L) 35.0 - 45.0 %    .1 (H) 78.0 - 100.0 FL    MCH 33.2 24.0 - 34.0 PG    MCHC 32.5 31.0 - 37.0 g/dL    RDW 16.3 (H) 11.6 - 14.5 %    PLATELET 978 869 - 646 K/uL    MPV 10.1 9.2 - 11.8 FL    NRBC 0.0 0  WBC    ABSOLUTE NRBC 0.00 0.00 - 0.01 K/uL    NEUTROPHILS 76 (H) 40 - 73 %    LYMPHOCYTES 10 (L) 21 - 52 %    MONOCYTES 10 3 - 10 %    EOSINOPHILS 2 0 - 5 %    BASOPHILS 1 0 - 2 %    IMMATURE GRANULOCYTES 2 (H) 0.0 - 0.5 %    ABS. NEUTROPHILS 6.9 1.8 - 8.0 K/UL    ABS. LYMPHOCYTES 0.9 0.9 - 3.6 K/UL    ABS. MONOCYTES 0.9 0.05 - 1.2 K/UL    ABS. EOSINOPHILS 0.2 0.0 - 0.4 K/UL    ABS. BASOPHILS 0.1 0.0 - 0.1 K/UL    ABS. IMM.  GRANS. 0.1 (H) 0.00 - 0.04 K/UL    DF AUTOMATED     MAGNESIUM    Collection Time: 11/19/22 10:00 AM   Result Value Ref Range    Magnesium 2.5 1.6 - 2.6 mg/dL   METABOLIC PANEL, BASIC    Collection Time: 11/19/22 10:00 AM   Result Value Ref Range    Sodium 129 (L) 136 - 145 mmol/L    Potassium 5.3 3.5 - 5.5 mmol/L    Chloride 98 (L) 100 - 111 mmol/L    CO2 19 (L) 21 - 32 mmol/L    Anion gap 12 3.0 - 18 mmol/L    Glucose 106 (H) 74 - 99 mg/dL    BUN 42 (H) 7.0 - 18 MG/DL    Creatinine 11.00 (H) 0.6 - 1.3 MG/DL    BUN/Creatinine ratio 4 (L) 12 - 20      eGFR 3 (L) >60 ml/min/1.73m2    Calcium 8.7 8.5 - 10.1 MG/DL   PHOSPHORUS    Collection Time: 11/19/22 10:00 AM   Result Value Ref Range    Phosphorus 6.6 (H) 2.5 - 4.9 MG/DL   GLUCOSE, POC    Collection Time: 11/19/22 10:13 AM   Result Value Ref Range    Glucose (POC) 105 70 - 110 mg/dL   GLUCOSE, POC    Collection Time: 11/19/22 12:48 PM   Result Value Ref Range    Glucose (POC) 105 70 - 110 mg/dL   GLUCOSE, POC    Collection Time: 11/19/22  4:33 PM   Result Value Ref Range    Glucose (POC) 108 70 - 110 mg/dL   GLUCOSE, POC    Collection Time: 11/19/22  9:34 PM   Result Value Ref Range    Glucose (POC) 116 (H) 70 - 110 mg/dL   CBC WITH AUTOMATED DIFF    Collection Time: 11/20/22  3:30 AM   Result Value Ref Range    WBC 8.0 4.6 - 13.2 K/uL    RBC 2.17 (L) 4.20 - 5.30 M/uL    HGB 7.3 (L) 12.0 - 16.0 g/dL    HCT 22.3 (L) 35.0 - 45.0 %    .8 (H) 78.0 - 100.0 FL    MCH 33.6 24.0 - 34.0 PG    MCHC 32.7 31.0 - 37.0 g/dL    RDW 15.9 (H) 11.6 - 14.5 %    PLATELET 627 276 - 166 K/uL    MPV 9.9 9.2 - 11.8 FL    NRBC 0.0 0  WBC    ABSOLUTE NRBC 0.00 0.00 - 0.01 K/uL    NEUTROPHILS 63 40 - 73 %    LYMPHOCYTES 18 (L) 21 - 52 %    MONOCYTES 14 (H) 3 - 10 %    EOSINOPHILS 4 0 - 5 %    BASOPHILS 1 0 - 2 %    IMMATURE GRANULOCYTES 2 (H) 0.0 - 0.5 %    ABS. NEUTROPHILS 5.0 1.8 - 8.0 K/UL    ABS. LYMPHOCYTES 1.4 0.9 - 3.6 K/UL    ABS. MONOCYTES 1.1 0.05 - 1.2 K/UL    ABS. EOSINOPHILS 0.3 0.0 - 0.4 K/UL    ABS. BASOPHILS 0.1 0.0 - 0.1 K/UL    ABS. IMM.  GRANS. 0.1 (H) 0.00 - 0.04 K/UL    DF AUTOMATED     MAGNESIUM    Collection Time: 11/20/22  3:30 AM   Result Value Ref Range    Magnesium 2.5 1.6 - 2.6 mg/dL   METABOLIC PANEL, BASIC    Collection Time: 11/20/22  3:30 AM   Result Value Ref Range    Sodium 128 (L) 136 - 145 mmol/L    Potassium 5.1 3.5 - 5.5 mmol/L    Chloride 97 (L) 100 - 111 mmol/L    CO2 18 (L) 21 - 32 mmol/L    Anion gap 13 3.0 - 18 mmol/L    Glucose 87 74 - 99 mg/dL    BUN 45 (H) 7.0 - 18 MG/DL    Creatinine 11.50 (H) 0.6 - 1.3 MG/DL    BUN/Creatinine ratio 4 (L) 12 - 20      eGFR 3 (L) >60 ml/min/1.73m2    Calcium 8.6 8.5 - 10.1 MG/DL   PHOSPHORUS    Collection Time: 11/20/22  3:30 AM   Result Value Ref Range    Phosphorus 7.1 (H) 2.5 - 4.9 MG/DL        XR (Most Recent). Results from [place] encounter on 11/14/22    XR CHEST PORT    Narrative  Portable CXR:    HISTORY: Central line placement. Comparison November 14, 2022. Left IJ catheter crosses midline, tip in the mid SVC. No left pneumothorax. Lungs are hypoinflated. Mild vascular congestion still suspected. No  consolidation. Eventration right hemidiaphragm, stable. Impression  No pneumothorax. Mild vascular congestion. Left IJ catheter tip in  the SVC. CT (Most Recent) Results from Hospital Encounter encounter on 11/14/22    CTA CHEST ABD PELV W WO CONT    Narrative  CT angiogram aorta. CT chest, abdomen and pelvis without and with IV contrast.    HISTORY: Chest pain and back pain with hypotension. All CT scans at this facility are performed using dose optimization technique as  appropriate to a performed exam, to include automated exposure control,  adjustment of the mA and/or kV according to patient size (including appropriate  matching for site specific examination) or use of iterative reconstruction  technique. Dialysis patient, to get dialysis the next morning. Axial CT images obtained. Sagittal and coronal MIP images of the aorta were  generated. Dedicated 3-D images of the aorta and its branches also generated on  a dedicated workstation.  Sagittal and coronal images of abdomen and pelvis also  generated. CT angiogram aorta: Noncontrast study shows no intramural hematoma. Contrast  study shows normal caliber throughout. No aortic dissection. Moderate  atherosclerotic calcification present. No focal aneurysm. Some degree of  stenosis due to plaques in the celiac and SMA origins. PATRICIO is occluded, also  obscured by motion artifacts. Stenotic renal arteries. Iliac arteries are within  normal caliber. Minimal ectasia of the bifurcation. CT CHEST: No pulmonary infiltrate or consolidation. No pleural effusion or  pneumothorax. No mediastinal or hilar mass. No cardiomegaly or pericardial  effusion. Corticated calcification noted. No central pulmonary embolism. CT ABDOMEN/PELVIS: Liver and spleen unremarkable. Cholecystectomy. Small hiatal  hernia. Gastric surgery. Pancreas grossly unremarkable. No adrenal mass. Atrophic kidneys with cystic lesions. Double J ureteral stent on the right. No  surrounding inflammation. Small bowel and colon not distended. No extraluminal inflammation. Suspect  scattered colonic diverticulosis. No ascites or free air. Mild subcutaneous edema throughout. Small amount of excreted contrast material  in the decompressed bladder. Uterus unremarkable. No pelvic mass. Degenerative disease in the thoracolumbar spine. Old left inferior pubic ramal  fracture. Impression  1. No aortic aneurysm or dissection. Moderately advanced atherosclerotic  disease. 2. No acute abnormalities in the chest, abdomen or pelvis. ECHO No results found for this or any previous visit. EKG No results found for this or any previous visit. I have discussed Ms. Faye Stern case with my attending who agrees with the plan of care.     Barbara Mueller DO , PGY-1   [facility]   November 20, 2022, 1:52 PM

## 2022-11-20 NOTE — PROGRESS NOTES
[facility]. Pulmonary, Critical Care, and Sleep Medicine    Name: Carlos Arguello MRN: [id number]   : 1945 Hospital: [address] [place] [address] Dr   Date: 2022  Admission Date: 2022     Chart and notes reviewed. Data reviewed. I have evaluated all findings. [x]I have reviewed the flowsheet and previous days notes. []The patient is unable to give any meaningful history or review of systems because the patient is:  []Intubated []Sedated   []Unresponsive      []The patient is critically ill on      []Mechanical ventilation []Pressors   []BiPAP []         Interval HPI:  Patient is a 76 y.o. female w/ PMH of chronic hypotension, right hydonephrosis with stent, depression, afibb, DM2 with neuropathy, ESRD on HD presenting to [facility] with c/o pain, weakness, and SOB on . Patient reports 3 days of diarrhea PTA. Son reports poor intake for mos with associated n/v. Son reports worsening decline since starting digoxin. Patient is currently complaining of leg cramps/ pain and restlessness. Also complaining of presyncope/lightheaded for a few weeks. Subjective 22  Hospital Day:6  Vent Day:n/a  Overnight events:No acute vents overnight  Mentation/Activity: Alert, oriented x 3  Respiratory/ Secretions:Room air, no signs of distress  Hemodynamics:Junctional rhythm, on Dopamine and Levophed  Urine output, bowel: Anuric, declines  HD treatment  Diet:Regular diet, no nausea complaint at this time  Need for procedures:n/a              ROS:Pertinent items are noted in HPI. Events and notes from last 24 hours reviewed.      Patient Active Problem List   Diagnosis Code    History of acute pyelonephritis Z87.448    History of infection with vancomycin resistant Enterococcus (VRE) Z86.19    Anemia in end-stage renal disease (HCC) N18.6, D63.1    Chronic hypotension I95.89    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 ([facility] E11.22, N18.6    Secondary hyperparathyroidism of renal origin ([facility] N25.81    Gastroesophageal reflux disease K21.9    History of recurrent urinary tract infection Z87.440    History of sepsis Z86.19    End-stage renal disease on hemodialysis ([facility]) N18.6, Z99.2    History of hydronephrosis Z87.448    History of septic shock Z86.19    Anticoagulated by anticoagulation treatment Z79.01    Paroxysmal atrial fibrillation ([facility]) I48.0    Closed fracture of left inferior pubic ramus, with routine healing, subsequent encounter S32.592D    Closed nondisplaced fracture of anterior wall of left acetabulum with routine healing S32.415D    Closed fracture of superior pubic ramus, left, with routine healing, subsequent encounter S32.512D    Multiple closed pelvic fractures without disruption of pelvic ring ([facility] S32.82XA    Depression F32. A    History of urinary tract infection Z87.440    Need for prophylactic isolation Z41.8    Hyperlipidemia E78.5    Hypothyroidism E03.9    History of kidney stones Z87.442    Chronic pain G89.29    Lung mass R91.8    History of urethral stricture Z87.448    Uric acid nephrolithiasis N20.0    Urinary incontinence R32    Glaucoma H40.9    Hyperphosphatemia E83.39    Mononeuropathy G58.9    Hyperkalemia E87.5    Gross hematuria R31.0    Impaired mobility and ADLs Z74.09, Z78.9    Stricture of female urethra N35.92    ESRD on dialysis ([facility]) N18.6, Z99.2    SOB (shortness of breath) on exertion R06.02    A-fib ([facility]) I48.91    Presence of Watchman left atrial appendage closure device Z95.818    Ureteral stricture N13.5    Bradycardia R00.1    Hypotension I95.9       Vital Signs:  Visit Vitals  BP 95/62   Pulse 71   Temp 98 °F (36.7 °C)   Resp 16   Ht 5' 2\" (1.575 m)   Wt 85 kg (187 lb 6.3 oz)   SpO2 (!) 80%   BMI 34.27 kg/m²       O2 Device: None (Room air)   O2 Flow Rate (L/min): 2 l/min   Temp (24hrs), Av °F (36.7 °C), Min:97.5 °F (36.4 °C), Max:98.4 °F (36.9 °C)       Intake/Output:   Last shift:      No intake/output data recorded.   Last 3 shifts: 11/18 0701 - 11/19 1900  In: 1824.4 [I.V.:1824.4]  Out: 0     Intake/Output Summary (Last 24 hours) at 11/20/2022 0037  Last data filed at 11/19/2022 1815  Gross per 24 hour   Intake 514.52 ml   Output --   Net 514.52 ml          Current Facility-Administered Medications   Medication Dose Route Frequency    melatonin tablet 5 mg  5 mg Oral QHS    NOREPINephrine (LEVOPHED) 8 mg in 5% dextrose 250mL (32 mcg/mL) infusion  0.5-50 mcg/min IntraVENous TITRATE    midodrine (PROAMATINE) tablet 10 mg  10 mg Oral TID WITH MEALS    levoFLOXacin (LEVAQUIN) 500 mg in D5W IVPB  500 mg IntraVENous Q48H    DOPamine (INTROPIN) 800 mg in dextrose 5% 500 mL infusion  5-20 mcg/kg/min IntraVENous TITRATE    epoetin caitlin-epbx (RETACRIT) injection 8,000 Units  8,000 Units SubCUTAneous Q TUE, THU & SAT    insulin lispro (HUMALOG) injection   SubCUTAneous AC&HS    aspirin chewable tablet 81 mg  81 mg Oral DAILY    clopidogreL (PLAVIX) tablet 75 mg  75 mg Oral DAILY    cefepime (MAXIPIME) 1 g in 0.9% sodium chloride (MBP/ADV) 50 mL MBP  1 g IntraVENous Q24H    sodium chloride (NS) flush 5-40 mL  5-40 mL IntraVENous Q8H    heparin (porcine) injection 5,000 Units  5,000 Units SubCUTAneous Q8H    DULoxetine (CYMBALTA) capsule 20 mg  20 mg Oral DAILY    famotidine (PF) (PEPCID) 20 mg in 0.9% sodium chloride 10 mL injection  20 mg IntraVENous R36I    folic acid (FOLVITE) tablet 1 mg  1 mg Oral DAILY         Telemetry: []Sinus []A-flutter []Paced    []A-fib []Multiple PVCs                  Physical Exam:      General: awake, alert, NAD   HEENT:  PERRL, EOMI, pink palpebral conjunctivae; mucosa moist  Resp:  Symmetrical chest expansion, no accessory muscle use; good airway entry; no rales/ wheezing/ rhonchi noted  CV:  S1, S2 present; regular rate and rhythm  GI:  Abdomen soft, non-tender; (+) active bowel sounds  Extremities:  tank radial/DP pulses intact, no tank LE edema/ cyanosis  Skin:  Warm, normal turgor/cap refill   Neurologic: Alert, oriented x 3, moving all extremities spontaneously  Devices:  Left IJ CVL , PIV REJ         DATA:  MAR reviewed and pertinent medications noted or modified as needed    Labs:  Recent Labs     11/19/22  1000 11/18/22  0400 11/17/22  0630   WBC 9.1 11.3 11.4   HGB 7.9* 8.0* 7.9*   HCT 24.3* 24.8* 24.2*    228 234     Recent Labs     11/19/22  1000 11/18/22  0400 11/17/22  0630   * 133* 133*   K 5.3 4.2 3.9   CL 98* 100 100   CO2 19* 20* 23   * 123* 175*   BUN 42* 32* 27*   CREA 11.00* 9.62* 8.52*   CA 8.7 8.3* 8.3*   MG 2.5 2.4 2.2   PHOS 6.6* 5.7* 4.9     No results for input(s): PH, PCO2, PO2, HCO3, FIO2 in the last 72 hours. No results for input(s): FIO2I, IFO2, HCO3I, IHCO3, HCOPOC, PCO2I, PCOPOC, IPHI, PHI, PHPOC, PO2I, PO2POC in the last 72 hours. No lab exists for component: IPOC2    Imaging:  [x]   I have personally reviewed the patients radiographs and reports  XR Results (most recent):  XR Results (most recent):  Results from Hospital Encounter encounter on 11/14/22    XR CHEST PORT    Narrative  Portable CXR:    HISTORY: Central line placement. Comparison November 14, 2022. Left IJ catheter crosses midline, tip in the mid SVC. No left pneumothorax. Lungs are hypoinflated. Mild vascular congestion still suspected. No  consolidation. Eventration right hemidiaphragm, stable. Impression  No pneumothorax. Mild vascular congestion. Left IJ catheter tip in  the SVC. CT Results (most recent):  Results from Hospital Encounter encounter on 11/14/22    CTA CHEST ABD PELV W WO CONT    Narrative  CT angiogram aorta. CT chest, abdomen and pelvis without and with IV contrast.    HISTORY: Chest pain and back pain with hypotension.     All CT scans at this facility are performed using dose optimization technique as  appropriate to a performed exam, to include automated exposure control,  adjustment of the mA and/or kV according to patient size (including appropriate  matching for site specific examination) or use of iterative reconstruction  technique. Dialysis patient, to get dialysis the next morning. Axial CT images obtained. Sagittal and coronal MIP images of the aorta were  generated. Dedicated 3-D images of the aorta and its branches also generated on  a dedicated workstation. Sagittal and coronal images of abdomen and pelvis also  generated. CT angiogram aorta: Noncontrast study shows no intramural hematoma. Contrast  study shows normal caliber throughout. No aortic dissection. Moderate  atherosclerotic calcification present. No focal aneurysm. Some degree of  stenosis due to plaques in the celiac and SMA origins. PATRICIO is occluded, also  obscured by motion artifacts. Stenotic renal arteries. Iliac arteries are within  normal caliber. Minimal ectasia of the bifurcation. CT CHEST: No pulmonary infiltrate or consolidation. No pleural effusion or  pneumothorax. No mediastinal or hilar mass. No cardiomegaly or pericardial  effusion. Corticated calcification noted. No central pulmonary embolism. CT ABDOMEN/PELVIS: Liver and spleen unremarkable. Cholecystectomy. Small hiatal  hernia. Gastric surgery. Pancreas grossly unremarkable. No adrenal mass. Atrophic kidneys with cystic lesions. Double J ureteral stent on the right. No  surrounding inflammation. Small bowel and colon not distended. No extraluminal inflammation. Suspect  scattered colonic diverticulosis. No ascites or free air. Mild subcutaneous edema throughout. Small amount of excreted contrast material  in the decompressed bladder. Uterus unremarkable. No pelvic mass. Degenerative disease in the thoracolumbar spine. Old left inferior pubic ramal  fracture. Impression  1. No aortic aneurysm or dissection. Moderately advanced atherosclerotic  disease. 2. No acute abnormalities in the chest, abdomen or pelvis.        08/15/22    ECHO CATINA W OR WO CONTRAST 08/15/2022 8/15/2022    Interpretation Summary    Left Ventricle: Normal left ventricular systolic function with a visually estimated EF of 55 - 60%. Left Atrium: No left atrial appendage thrombus noted. Watchman well-seated with no residual jet around the device. Signed by: John Vang MD on 8/15/2022 12:22 PM       IMPRESSION:   Acute on chronic hypotension (OP midodrine) requiring levophed and dopamine- likely secondary to complete heart block. Symptomatic bradycardia- since starting of digoxin in the last month  Hallucinations- due to morphine vs delirium, improving. Pt with confusion that is worse at nighttime.   Chronic nausea with poor PO intake, suspect gastroparesis, CT abd neg   Hx a fib s/p watchman procedure   ESRD, pt refusing dialysis  Hx hydronephrosis with chronic right ureteral stent, recent exchanged 11/8  DM2 with diabetic neuropathy, suspect DAN  Debility with functional weakness, needs assistance with ADLs  HX anxiety/depression     Patient Active Problem List   Diagnosis Code    History of acute pyelonephritis Z87.448    History of infection with vancomycin resistant Enterococcus (VRE) Z86.19    Anemia in end-stage renal disease ([facility]) N18.6, D63.1    Chronic hypotension I95.89    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 ([facility]) E11.22, N18.6    Secondary hyperparathyroidism of renal origin ([facility] N25.81    Gastroesophageal reflux disease K21.9    History of recurrent urinary tract infection Z87.440    History of sepsis Z86.19    End-stage renal disease on hemodialysis ([facility]) N18.6, Z99.2    History of hydronephrosis Z87.448    History of septic shock Z86.19    Anticoagulated by anticoagulation treatment Z79.01    Paroxysmal atrial fibrillation ([facility]) I48.0    Closed fracture of left inferior pubic ramus, with routine healing, subsequent encounter S32.592D    Closed nondisplaced fracture of anterior wall of left acetabulum with routine healing S32.415D    Closed fracture of superior pubic ramus, left, with routine healing, subsequent encounter S32.512D    Multiple closed pelvic fractures without disruption of pelvic ring ([facility]) S32.82XA    Depression F32. A    History of urinary tract infection Z87.440    Need for prophylactic isolation Z41.8    Hyperlipidemia E78.5    Hypothyroidism E03.9    History of kidney stones Z87.442    Chronic pain G89.29    Lung mass R91.8    History of urethral stricture Z87.448    Uric acid nephrolithiasis N20.0    Urinary incontinence R32    Glaucoma H40.9    Hyperphosphatemia E83.39    Mononeuropathy G58.9    Hyperkalemia E87.5    Gross hematuria R31.0    Impaired mobility and ADLs Z74.09, Z78.9    Stricture of female urethra N35.92    ESRD on dialysis ([facility]) N18.6, Z99.2    SOB (shortness of breath) on exertion R06.02    A-fib ([facility]) I48.91    Presence of Watchman left atrial appendage closure device Z95.818    Ureteral stricture N13.5    Bradycardia R00.1    Hypotension I95.9        RECOMMENDATIONS:   Neuro: Neuro checks per routine, Melatonin for sleep hygiene, Cymbalta, prn pain medication   Pulm: Supplemental O2 via NC, titrate flow for goal SPO2> 90%,  pulmonary hygiene care, Aspiration precautions, Keep HOB >30 degrees  CVS:  Actively titrate vasopressors aim systolic >56 mmHg. Titrate Dopamine for HR>65, titrate Levophed for SBP>90. Scheduled Midodrine, Cardiology following, recc pacemaker for bradycardia/complete heart block, however not consistent with pts goals of care. GI: SUP, Zofran PRN for N/V, Diet-Regular  Renal: Trend Renal indices, nephro following. Pt declines HD treatment. Hem/Onc: Monitor for s/o active bleeding. I/D: Sepsis bundle per hospital protocol. Blood and Urine cultures drawn and will be followed. UCx negative, Resp viral panel (-), BC (+) x 1 GPC Lactic acid normalized. Procal low. Likely contaminant, BC redrawn; NGTD. Antibiotics:cefepime. Trend WBCs and temperature curve. Endocrine: Q6 glucoses, SSI. TSH normal   Metabolic:  Daily BMP, mag, phos.  Trend lytes, replace as needed. Musc/Skin: no acute issues  DNR/DNI, signed POST w limited interventions   Plan to wean vasopressors this weekend and if pt cannot come off ggts, will be proceeding to comfort care on Monday  Discussed in interdisciplinary rounds         Best practice :    Glycemic control  IHI ICU bundles:   Central Line Bundle Followed     Stress ulcer prophylaxis. Pepcid  DVT prophylaxis. SQH  Need for Lines, chua assessed. Palliative care evaluation.     Beauty Mohs, NP   11/20/22  Pulmonary, Critical Care Medicine  [address] [place] [address] [facility]

## 2022-11-20 NOTE — PROGRESS NOTES
Problem: Falls - Risk of  Goal: *Absence of Falls  Description: Document Hastings Fail Fall Risk and appropriate interventions in the flowsheet. Outcome: Progressing Towards Goal  Note: Fall Risk Interventions:  Mobility Interventions: Strengthening exercises (ROM-active/passive)    Mentation Interventions: Bed/chair exit alarm, Evaluate medications/consider consulting pharmacy, Reorient patient, Room close to nurse's station    Medication Interventions: Assess postural VS orthostatic hypotension    Elimination Interventions:  Toileting schedule/hourly rounds    History of Falls Interventions: Bed/chair exit alarm, Evaluate medications/consider consulting pharmacy         Problem: Patient Education: Go to Patient Education Activity  Goal: Patient/Family Education  Outcome: Progressing Towards Goal     Problem: Pain  Goal: *Control of Pain  Outcome: Progressing Towards Goal     Problem: Patient Education: Go to Patient Education Activity  Goal: Patient/Family Education  Outcome: Progressing Towards Goal     Problem: Hypotension  Goal: *Blood pressure within specified parameters  Outcome: Progressing Towards Goal  Goal: *Fluid volume balance  Outcome: Progressing Towards Goal  Goal: *Labs within defined limits  Outcome: Progressing Towards Goal     Problem: Patient Education: Go to Patient Education Activity  Goal: Patient/Family Education  Outcome: Progressing Towards Goal     Problem: Patient Education: Go to Patient Education Activity  Goal: Patient/Family Education  Outcome: Progressing Towards Goal     Problem: Delirium Treatment  Goal: *Level of consciousness restored to baseline  Outcome: Progressing Towards Goal  Goal: *Level of environmental perceptions restored to baseline  Outcome: Progressing Towards Goal  Goal: *Sensory perception restored to baseline  Outcome: Progressing Towards Goal  Goal: *Emotional stability restored to baseline  Outcome: Progressing Towards Goal  Goal: *Functional assessment restored to baseline  Outcome: Progressing Towards Goal  Goal: *Absence of falls  Outcome: Progressing Towards Goal  Goal: *Will remain free of delirium, CAM Score negative  Outcome: Progressing Towards Goal  Goal: *Cognitive status will be restored to baseline  Outcome: Progressing Towards Goal  Goal: Interventions  Outcome: Progressing Towards Goal     Problem: Patient Education: Go to Patient Education Activity  Goal: Patient/Family Education  Outcome: Progressing Towards Goal     Problem: Pressure Injury - Risk of  Goal: *Prevention of pressure injury  Description: Document Giovany Scale and appropriate interventions in the flowsheet. Outcome: Progressing Towards Goal  Note: Pressure Injury Interventions:  Sensory Interventions: Assess changes in LOC, Keep linens dry and wrinkle-free, Minimize linen layers, Turn and reposition approx.  every two hours (pillows and wedges if needed)    Moisture Interventions: Absorbent underpads    Activity Interventions: Pressure redistribution bed/mattress(bed type), PT/OT evaluation    Mobility Interventions: HOB 30 degrees or less, Pressure redistribution bed/mattress (bed type)    Nutrition Interventions: Document food/fluid/supplement intake    Friction and Shear Interventions: HOB 30 degrees or less

## 2022-11-20 NOTE — PROGRESS NOTES
Problem: Falls - Risk of  Goal: *Absence of Falls  Description: Document Richard Oris Fall Risk and appropriate interventions in the flowsheet. Outcome: Progressing Towards Goal  Note: Fall Risk Interventions:       Mentation Interventions: Bed/chair exit alarm, Evaluate medications/consider consulting pharmacy, Reorient patient, Room close to nurse's station    Medication Interventions: Bed/chair exit alarm, Patient to call before getting OOB    Elimination Interventions: Call light in reach    History of Falls Interventions: Bed/chair exit alarm, Evaluate medications/consider consulting pharmacy         Problem: Pain  Goal: *Control of Pain  Outcome: Progressing Towards Goal     Problem: Hypotension  Goal: *Blood pressure within specified parameters  Outcome: Progressing Towards Goal  Goal: *Fluid volume balance  Outcome: Progressing Towards Goal  Goal: *Labs within defined limits  Outcome: Progressing Towards Goal     Problem: Pressure Injury - Risk of  Goal: *Prevention of pressure injury  Description: Document Giovany Scale and appropriate interventions in the flowsheet. Outcome: Progressing Towards Goal  Note: Pressure Injury Interventions:  Sensory Interventions: Assess changes in LOC, Keep linens dry and wrinkle-free, Minimize linen layers, Turn and reposition approx.  every two hours (pillows and wedges if needed)    Moisture Interventions: Absorbent underpads    Activity Interventions: Pressure redistribution bed/mattress(bed type)    Mobility Interventions: Pressure redistribution bed/mattress (bed type)    Nutrition Interventions: Document food/fluid/supplement intake    Friction and Shear Interventions: HOB 30 degrees or less

## 2022-11-20 NOTE — PROGRESS NOTES
[facility]NAL DAILY PROGRESS NOTE              Subjective:       Complaint:   Intermittent confusion  no nausea, vomiting, chest pain, short of breath, cough, seizure. IMPRESSION:   ESRD   Hypotension with symptomatic bradycardia? Vs related to bacteremia  Bacteremia in setting of recent stent exchange(for hydronephrosis) ? contamination. Hx Falls, Pelvic fx  Hx A fib  Chronic hypotension on Midodrine as OP. Currently held due to bradycardia  Secondary hyperparathyroidism  ACD  Hx a fib now with bradycardia ?due to rate controlling agents like metoprolol,digoxin ?pacer consideration  UTI? Diabetes  Chronic nausea,dizziness   PLAN:    C/w dopamine and levophed per ICU team  No more dialysis per patient wishes  To be transitioned to comfort care tomorrow?            Current Facility-Administered Medications   Medication Dose Route Frequency    melatonin tablet 5 mg  5 mg Oral QHS    NOREPINephrine (LEVOPHED) 8 mg in 5% dextrose 250mL (32 mcg/mL) infusion  0.5-50 mcg/min IntraVENous TITRATE    diphenhydrAMINE (BENADRYL) capsule 25 mg  25 mg Oral Q6H PRN    midodrine (PROAMATINE) tablet 10 mg  10 mg Oral TID WITH MEALS    bisacodyL (DULCOLAX) suppository 10 mg  10 mg Rectal DAILY PRN    levoFLOXacin (LEVAQUIN) 500 mg in D5W IVPB  500 mg IntraVENous Q48H    morphine IR (MS IR) tablet 7.5 mg  7.5 mg Oral DAILY PRN    morphine (ROXANOL) 20 mg/mL concentrated solution 2.6-5 mg  2.6-5 mg Oral Q4H PRN    LORazepam (INTENSOL) 2 mg/mL oral concentrate 0.5 mg  0.5 mg Oral Q4H PRN    DOPamine (INTROPIN) 800 mg in dextrose 5% 500 mL infusion  5-20 mcg/kg/min IntraVENous TITRATE    epoetin caitlin-epbx (RETACRIT) injection 8,000 Units  8,000 Units SubCUTAneous Q TUE, THU & SAT    insulin lispro (HUMALOG) injection   SubCUTAneous AC&HS    glucose chewable tablet 16 g  4 Tablet Oral PRN    glucagon (GLUCAGEN) injection 1 mg  1 mg IntraMUSCular PRN    dextrose 10% infusion 0-250 mL  0-250 mL IntraVENous PRN    aspirin chewable tablet 81 mg  81 mg Oral DAILY    clopidogreL (PLAVIX) tablet 75 mg  75 mg Oral DAILY    cefepime (MAXIPIME) 1 g in 0.9% sodium chloride (MBP/ADV) 50 mL MBP  1 g IntraVENous Q24H    sodium chloride (NS) flush 5-40 mL  5-40 mL IntraVENous Q8H    sodium chloride (NS) flush 5-40 mL  5-40 mL IntraVENous PRN    acetaminophen (TYLENOL) tablet 650 mg  650 mg Oral Q6H PRN    Or    acetaminophen (TYLENOL) suppository 650 mg  650 mg Rectal Q6H PRN    polyethylene glycol (MIRALAX) packet 17 g  17 g Oral DAILY PRN    ondansetron (ZOFRAN ODT) tablet 4 mg  4 mg Oral Q8H PRN    Or    ondansetron (ZOFRAN) injection 4 mg  4 mg IntraVENous Q6H PRN    heparin (porcine) injection 5,000 Units  5,000 Units SubCUTAneous Q8H    DULoxetine (CYMBALTA) capsule 20 mg  20 mg Oral DAILY    famotidine (PF) (PEPCID) 20 mg in 0.9% sodium chloride 10 mL injection  20 mg IntraVENous P22X    folic acid (FOLVITE) tablet 1 mg  1 mg Oral DAILY       Review of Symptoms: comprehensive ROS negative except above.    Objective:   Patient Vitals for the past 24 hrs:   Temp Pulse Resp BP SpO2   11/20/22 0800 97.8 °F (36.6 °C) 64 14 (!) 159/127 100 %   11/20/22 0730 -- 62 22 (!) 123/95 (!) 77 %   11/20/22 0700 -- 60 13 (!) 105/47 98 %   11/20/22 0645 -- 60 12 (!) 92/48 98 %   11/20/22 0630 -- 61 12 (!) 98/48 97 %   11/20/22 0615 -- 61 12 (!) 99/47 98 %   11/20/22 0600 -- 62 12 (!) 100/47 98 %   11/20/22 0545 -- 63 12 (!) 101/45 98 %   11/20/22 0530 -- 62 12 (!) 96/46 96 %   11/20/22 0515 -- 62 13 (!) 90/45 96 %   11/20/22 0500 -- 63 13 (!) 88/46 98 %   11/20/22 0445 -- 62 12 (!) 86/44 94 %   11/20/22 0430 -- 61 12 (!) 90/46 95 %   11/20/22 0415 -- 64 14 (!) 96/46 96 %   11/20/22 0400 98 °F (36.7 °C) 63 15 (!) 84/46 97 %   11/20/22 0345 -- 64 12 (!) 91/50 95 %   11/20/22 0330 -- 62 19 (!) 89/54 97 %   11/20/22 0315 -- 66 16 (!) 88/39 (!) 66 %   11/20/22 0300 -- 66 16 (!) 86/45 95 %   11/20/22 0247 -- 66 17 (!) 93/44 95 %   11/20/22 0245 -- 67 16 (!) 205/169 93 % 11/20/22 0230 -- 67 16 (!) 123/97 98 %   11/20/22 0215 -- 67 16 (!) 114/52 99 %   11/20/22 0200 -- 68 14 (!) 102/55 96 %   11/20/22 0145 -- 68 17 113/88 97 %   11/20/22 0132 -- 69 21 (!) 105/58 93 %   11/20/22 0100 -- 68 11 (!) 81/51 95 %   11/20/22 0045 -- 66 18 (!) 100/51 94 %   11/20/22 0030 -- 69 18 111/82 98 %   11/20/22 0015 -- 68 14 (!) 88/64 (!) 77 %   11/20/22 0000 98 °F (36.7 °C) 71 16 95/62 (!) 80 %   11/19/22 2330 -- 67 16 (!) 129/92 96 %   11/19/22 2315 -- 65 12 (!) 116/50 96 %   11/19/22 2300 -- 65 17 (!) 71/58 95 %   11/19/22 2245 -- 65 13 (!) 124/50 95 %   11/19/22 2230 -- 65 16 91/68 96 %   11/19/22 2215 -- 65 14 (!) 116/51 96 %   11/19/22 2200 -- 65 16 95/71 97 %   11/19/22 2145 -- 65 16 116/89 95 %   11/19/22 2130 -- 64 13 (!) 120/58 95 %   11/19/22 2115 -- 65 14 (!) 115/59 97 %   11/19/22 2100 -- 65 15 98/82 97 %   11/19/22 2045 -- 63 16 (!) 103/53 98 %   11/19/22 2030 -- 65 13 (!) 95/51 96 %   11/19/22 2015 -- 61 18 (!) 93/47 100 %   11/19/22 2000 97.9 °F (36.6 °C) 61 15 (!) 110/42 97 %   11/19/22 1900 -- 65 19 (!) 120/91 100 %   11/19/22 1800 -- 69 19 (!) 145/103 98 %   11/19/22 1745 -- 69 20 126/84 96 %   11/19/22 1730 -- 67 12 (!) 113/45 93 %   11/19/22 1715 -- 66 15 (!) 114/37 95 %   11/19/22 1700 -- 68 13 (!) 100/33 95 %   11/19/22 1645 -- 67 14 (!) 82/64 97 %   11/19/22 1630 -- 67 14 90/76 98 %   11/19/22 1615 -- 68 17 (!) 91/58 94 %   11/19/22 1600 97.5 °F (36.4 °C) 68 17 90/76 98 %   11/19/22 1545 -- 69 18 112/81 97 %   11/19/22 1530 -- 69 17 (!) 101/50 95 %   11/19/22 1515 -- 68 21 (!) 103/55 95 %   11/19/22 1500 -- 64 26 (!) 128/100 98 %   11/19/22 1445 -- 67 16 (!) 104/92 99 %   11/19/22 1345 97.8 °F (36.6 °C) 70 22 (!) 92/50 --   11/19/22 1340 -- 70 -- (!) 102/39 --   11/19/22 1330 -- 65 17 (!) 102/39 --   11/19/22 1315 -- 70 29 124/87 (!) 75 %   11/19/22 1310 -- 65 16 -- 95 %   11/19/22 1300 -- 62 20 112/78 100 %   11/19/22 1250 -- 68 19 (!) 116/50 95 %   11/19/22 1240 -- 63 18 105/86 98 %   11/19/22 1230 -- 62 19 (!) 122/59 100 %   11/19/22 1220 -- 68 17 (!) 117/53 100 %   11/19/22 1210 -- 62 16 121/82 100 %   11/19/22 1200 98.4 °F (36.9 °C) 67 16 (!) 140/103 100 %   11/19/22 1115 -- 61 16 (!) 73/56 99 %   11/19/22 1100 -- 61 15 (!) 78/46 98 %   11/19/22 1045 -- 60 16 (!) 83/60 98 %          Weight change:      11/18 1901 - 11/20 0700  In: 1102 [I.V.:1102]  Out: 0     Intake/Output Summary (Last 24 hours) at 11/20/2022 1036  Last data filed at 11/20/2022 0700  Gross per 24 hour   Intake 734.52 ml   Output --   Net 734.52 ml       Physical Exam:   General: comfortable, no acute distress   HEENT sclera anicteric, supple neck, no thyromegaly  CVS: S1S2 heard,  no rub  RS: + air entry b/l,   Abd: Soft, Non tender, Not distended, Positive bowel sounds, no organomegaly, no CVA / supra pubic tenderness  Neuro: non focal, awake, alert , CN II-XII are grossly intact  Extrm: no edema, no cyanosis, clubbing   Skin: no visible  Rash  Musculoskeletal: No gross joints or bone deformities         Data Review:     LABS:   Hematology:   Recent Labs     11/20/22  0330 11/19/22  1000 11/18/22  0400   WBC 8.0 9.1 11.3   HGB 7.3* 7.9* 8.0*   HCT 22.3* 24.3* 24.8*       Chemistry:   Recent Labs     11/20/22  0330 11/19/22  1000 11/18/22  0400   BUN 45* 42* 32*   CREA 11.50* 11.00* 9.62*   CA 8.6 8.7 8.3*   K 5.1 5.3 4.2   * 129* 133*   CL 97* 98* 100   CO2 18* 19* 20*   PHOS 7.1* 6.6* 5.7*   GLU 87 106* 123*              Procedures/imaging: see electronic medical records for all procedures, Xrays and details which were not copied into this note but were reviewed prior to creation of Plan          Assessment & Plan:       See above      Julian Blank MD  11/20/2022

## 2022-11-20 NOTE — PROGRESS NOTES
0715  Bedside shift change report given to Terrell Isabel RN (oncoming nurse) by MACK Sales RN (offgoing nurse).  Report included the following information SBAR, Intake/Output, MAR, Recent Results, Med Rec Status, and Cardiac Rhythm S. R. To S. T.

## 2022-11-20 NOTE — PROGRESS NOTES
0730am Bedside shift report received from Marc Quan RN  0815am BP is high/ Levo IV gtt turn off.  1013am SBP dropped in the low 70's. Titrate Levo IV gtt to keep MAP above 65.  1415pm Pt appears frustrated and anxious. Admin 0.5 mg fo Ativan IVP as per prn. Son and daughter in law at the bedside. BP has increased to the 160's. Wean Levo IV gtt to keep SBP above 90 and MAP above 65  1900pm Bedside shift change report given to Danielle Kohler RN by Jennifer Schaefer RN. Report included the following information SBAR, Kardex, Intake/Output, MAR, Recent Results, Med Rec Status, Cardiac Rhythm NSR, and Alarm Parameters .

## 2022-11-21 NOTE — INTERDISCIPLINARY ROUNDS
[facility]  Pulmonary, Critical Care, and Sleep Medicine  Interdisciplinary and Ventilator Weaning Rounds    Patient discussed in morning walking rounds and interdisciplinary rounds. ICU day: 11/15/22     Overnight events:   On dopamine and levophed, labile Bps  Hyperkalemia, corected  Refuses dialysis    Assessments and best practice:  Ventilator  None  Sedation  N/A   Other pertinent drips  levophed and dopamine , NS @ 50 ml/hr   Lines noted  L IJ CVL  Critical labs assessed  Yes  Antibiotics  Levaquin, cefepime  Medications reviewed  Yes  Pending imaging  none  Pending send out labs  No  Pending Procedures  None  Glycemic control  SSI   Stress ulcer prophylaxis. Pepcid  DVT prophylaxis. SQH  Need for Lines, chua assessed. Yes  Restraint Reevaluation   N/A    Family contact/MPOA: Jesus Lazaro (son)   Family updated:  To be updated by ICU staff     Palliative consult within 3 days of admission to ICU-  Ethics Guidance: 21 days    Daily Plans:  Comfort measures today  D5 1/2NS at 50/hr      LIZA time 10 minutes    Vira Oro PA-C  11/21/22  Pulmonary, [address] [place] [address],[address] [facility]

## 2022-11-21 NOTE — PROGRESS NOTES
Brief Progress Note  [facility]    For full assessment and plan, please see daily progress note      Alcira Bell is a 76 y.o.  female with PMHx of chronic hypotension, ESRD on HD, R hydronephrosis w/ stent, a-fib, T2DM w/ neuropathy (controlled), hypothyroidism, and macrocytic anemia who is currently admitted to ICU for management of chronic hypotension and symptomatic bradycardia requiring vasopressor therapy. UA strongly suspicious for UTI, likely urosepsis. 1650 hrs :   Received call from Dr. Naila Fountain stating that patient Alcira Bell was placed on comfort care, all pressors stopped and will be moved off of the ICU    S :   Went to see patient in ICU room 311, patient able to speak coherently and kept repeating \" please God please let me go \" Ms. Opal Lopes was able to see me, and was able to tell me that she cant breathe. No Family was at bedside     O:  Visit Vitals  BP (!) 73/46   Pulse 62   Temp 97 °F (36.1 °C)   Resp 17   Ht 5' 2\" (1.575 m)   Wt 85 kg (187 lb 6.3 oz)   SpO2 97%   BMI 34.27 kg/m²      A/P   - patient is on COMFORT CARE MEASURES and will be moved off the ICU unit to a room on the floor.   -  services called to provide spiritual comfort to patient   - morphine/ativan every 15 mins to be given.        Iqra Carrion MD, PGY-2   [facility]   Pager [phone]

## 2022-11-21 NOTE — ACP (ADVANCE CARE PLANNING)
Advance Care Planning     General Advance Care Planning (ACP) Conversation      Date of Conversation: 11/21/2022    Conducted with: Patient, patient's son (Omid Potter), patient's daughter-in-law (Suann Runner), Dr. Katherine Churchill (Palliative), Jaime Nielsen, RN (Palliative) and this writer. Healthcare Decision Maker:     Patient has a scanned advance medical directive (AMD), on file    Primary Decision Maker: Nakia Mosher - Son - [phone]  Secondary Decision Maker: Shania Garcia - Daughter-in-Law - [phone]      Content/Action Overview: This writer, along with Dr. Katherine Churchill and Jaime Nielsen, RN, with the Palliative Care team; visited with patient today to offer support and to also discuss goal of care moving forward. Dr. Celina Mckeon spoke with patient and her family about transitioning to comfort measures. This would entail turning down her drip and then off. Once off, patient's symptoms would be managed by medications to keep her comfortable. Patient and her family were in agreement with this transition. A new POST was completed, for comfort measures. Patient's son Ade Nguyen) signed the POST form. At this time, patient is a DNR/DNI with Comfort Measures and NO feeding tubes. Length of Voluntary ACP Conversation in minutes:  30 minutes        Karla Cortes Asp., [facility]  Palliative Care   REJI#[phone]

## 2022-11-21 NOTE — PROGRESS NOTES
Problem: Falls - Risk of  Goal: *Absence of Falls  Description: Document Clearence Skates Fall Risk and appropriate interventions in the flowsheet. Outcome: Progressing Towards Goal  Note: Fall Risk Interventions:  Mobility Interventions: Assess mobility with egress test, Communicate number of staff needed for ambulation/transfer    Mentation Interventions: Adequate sleep, hydration, pain control, Door open when patient unattended, Evaluate medications/consider consulting pharmacy, More frequent rounding, Reorient patient, Toileting rounds    Medication Interventions: Evaluate medications/consider consulting pharmacy    Elimination Interventions: Call light in reach, Toileting schedule/hourly rounds    History of Falls Interventions: Consult care management for discharge planning, Door open when patient unattended, Evaluate medications/consider consulting pharmacy         Problem: Pain  Goal: *Control of Pain  Outcome: Progressing Towards Goal     Problem: Hypotension  Goal: *Blood pressure within specified parameters  Outcome: Progressing Towards Goal     Problem: Pressure Injury - Risk of  Goal: *Prevention of pressure injury  Description: Document Giovany Scale and appropriate interventions in the flowsheet.   Outcome: Progressing Towards Goal  Note: Pressure Injury Interventions:  Sensory Interventions: Assess changes in LOC, Check visual cues for pain, Keep linens dry and wrinkle-free, Minimize linen layers, Pressure redistribution bed/mattress (bed type)    Moisture Interventions: Absorbent underpads, Check for incontinence Q2 hours and as needed, Minimize layers    Activity Interventions: Pressure redistribution bed/mattress(bed type)    Mobility Interventions: HOB 30 degrees or less, Pressure redistribution bed/mattress (bed type)    Nutrition Interventions: Document food/fluid/supplement intake    Friction and Shear Interventions: HOB 30 degrees or less, Lift sheet, Minimize layers

## 2022-11-21 NOTE — ROUTINE PROCESS
Bedside and Verbal shift change report given to Jonathan Kahn RN (oncoming nurse) by Nader Laboy RN (offgoing nurse). Report included the following information Kardex, Intake/Output, MAR, and Recent Results.

## 2022-11-21 NOTE — HOSPICE
Patient resting in bed comfortably in no apparent distress on room air. No family bedside. Patient behavior indicators negative for pain and discomfort. Patient denies SOB. BP 68/46. Per chart, patient has only received PRN Morphine on 11/17 and 11/20; medication ordered for once daily as needed. Patient has received PRN Benadryl 11/19 and twice on 11/20. At this time, patient does not appear to be appropriate for GIP. Patient does not have any uncontrolled symptoms.

## 2022-11-21 NOTE — PROGRESS NOTES
RE[facility] DAILY PROGRESS NOTE              Subjective:       Complaint:   Intermittent confusion  no nausea, vomiting, chest pain, short of breath, cough, seizure. IMPRESSION:   ESRD   Hypotension with symptomatic bradycardia? Vs related to bacteremia  Bacteremia in setting of recent stent exchange(for hydronephrosis) ? contamination. Hx Falls, Pelvic fx  Hx A fib  Chronic hypotension on Midodrine as OP. Currently held due to bradycardia  Secondary hyperparathyroidism  ACD  Hx a fib now with bradycardia ?due to rate controlling agents like metoprolol,digoxin ?pacer consideration  UTI? Diabetes  Chronic nausea,dizziness   PLAN:    C/w dopamine and levophed per ICU team  No more dialysis per patient wishes  To be transitioned to comfort care today?            Current Facility-Administered Medications   Medication Dose Route Frequency    [START ON 11/22/2022] famotidine (PF) (PEPCID) 20 mg in 0.9% sodium chloride 10 mL injection  20 mg IntraVENous Q48H    melatonin tablet 5 mg  5 mg Oral QHS    NOREPINephrine (LEVOPHED) 8 mg in 5% dextrose 250mL (32 mcg/mL) infusion  0.5-50 mcg/min IntraVENous TITRATE    diphenhydrAMINE (BENADRYL) capsule 25 mg  25 mg Oral Q6H PRN    midodrine (PROAMATINE) tablet 10 mg  10 mg Oral TID WITH MEALS    bisacodyL (DULCOLAX) suppository 10 mg  10 mg Rectal DAILY PRN    levoFLOXacin (LEVAQUIN) 500 mg in D5W IVPB  500 mg IntraVENous Q48H    morphine IR (MS IR) tablet 7.5 mg  7.5 mg Oral DAILY PRN    morphine (ROXANOL) 20 mg/mL concentrated solution 2.6-5 mg  2.6-5 mg Oral Q4H PRN    LORazepam (INTENSOL) 2 mg/mL oral concentrate 0.5 mg  0.5 mg Oral Q4H PRN    DOPamine (INTROPIN) 800 mg in dextrose 5% 500 mL infusion  3-20 mcg/kg/min IntraVENous TITRATE    epoetin caitlin-epbx (RETACRIT) injection 8,000 Units  8,000 Units SubCUTAneous Q TUE, THU & SAT    insulin lispro (HUMALOG) injection   SubCUTAneous AC&HS    glucose chewable tablet 16 g  4 Tablet Oral PRN    glucagon (GLUCAGEN) injection 1 mg  1 mg IntraMUSCular PRN    dextrose 10% infusion 0-250 mL  0-250 mL IntraVENous PRN    aspirin chewable tablet 81 mg  81 mg Oral DAILY    clopidogreL (PLAVIX) tablet 75 mg  75 mg Oral DAILY    cefepime (MAXIPIME) 1 g in 0.9% sodium chloride (MBP/ADV) 50 mL MBP  1 g IntraVENous Q24H    sodium chloride (NS) flush 5-40 mL  5-40 mL IntraVENous Q8H    sodium chloride (NS) flush 5-40 mL  5-40 mL IntraVENous PRN    acetaminophen (TYLENOL) tablet 650 mg  650 mg Oral Q6H PRN    Or    acetaminophen (TYLENOL) suppository 650 mg  650 mg Rectal Q6H PRN    polyethylene glycol (MIRALAX) packet 17 g  17 g Oral DAILY PRN    ondansetron (ZOFRAN ODT) tablet 4 mg  4 mg Oral Q8H PRN    Or    ondansetron (ZOFRAN) injection 4 mg  4 mg IntraVENous Q6H PRN    heparin (porcine) injection 5,000 Units  5,000 Units SubCUTAneous Q8H    DULoxetine (CYMBALTA) capsule 20 mg  20 mg Oral DAILY    folic acid (FOLVITE) tablet 1 mg  1 mg Oral DAILY       Review of Symptoms: comprehensive ROS negative except above.    Objective:   Patient Vitals for the past 24 hrs:   Temp Pulse Resp BP SpO2   11/21/22 0700 -- 61 15 (!) 106/43 100 %   11/21/22 0645 -- (!) 57 14 (!) 106/52 100 %   11/21/22 0630 -- 61 19 (!) 102/54 100 %   11/21/22 0615 -- 63 14 (!) 110/49 100 %   11/21/22 0600 -- (!) 58 15 (!) 99/54 100 %   11/21/22 0545 -- (!) 57 15 (!) 95/51 100 %   11/21/22 0530 -- 61 17 (!) 98/53 100 %   11/21/22 0515 -- (!) 56 18 (!) 102/58 100 %   11/21/22 0500 -- 62 19 (!) 102/46 98 %   11/21/22 0430 -- 65 16 (!) 127/116 94 %   11/21/22 0415 -- 64 18 (!) 79/68 97 %   11/21/22 0400 98.5 °F (36.9 °C) 60 17 (!) 99/45 97 %   11/21/22 0345 -- (!) 59 16 (!) 96/44 98 %   11/21/22 0330 -- (!) 57 16 (!) 100/48 99 %   11/21/22 0315 -- 67 16 (!) 97/44 100 %   11/21/22 0300 -- (!) 54 15 (!) 95/43 100 %   11/21/22 0245 -- (!) 58 15 (!) 99/42 100 %   11/21/22 0215 -- (!) 57 17 (!) 87/44 99 %   11/21/22 0200 -- (!) 56 17 (!) 82/29 96 %   11/21/22 0130 -- (!) 58 12 (!) 89/68 99 %   11/21/22 0100 -- (!) 54 16 (!) 82/40 100 %   11/21/22 0030 -- (!) 55 18 (!) 100/90 100 %   11/21/22 0000 98.3 °F (36.8 °C) (!) 58 14 (!) 112/90 100 %   11/20/22 2345 -- 60 18 (!) 107/91 100 %   11/20/22 2333 -- 62 20 (!) 143/114 --   11/20/22 2330 -- 64 21 (!) 172/119 100 %   11/20/22 2315 -- 65 25 (!) 124/108 100 %   11/20/22 2300 -- 62 18 (!) 130/103 99 %   11/20/22 2200 -- 66 14 (!) 114/93 (!) 74 %   11/20/22 1949 98.2 °F (36.8 °C) (!) 58 15 91/76 100 %   11/20/22 1937 100.3 °F (37.9 °C) -- -- -- --   11/20/22 1900 -- 65 16 127/83 (!) 84 %   11/20/22 1830 -- 64 18 (!) 153/123 --   11/20/22 1815 -- 63 20 -- --   11/20/22 1800 -- 60 15 (!) 106/56 --   11/20/22 1700 -- 64 16 126/79 --   11/20/22 1630 -- 67 17 (!) 116/45 91 %   11/20/22 1615 -- 66 14 90/75 100 %   11/20/22 1600 97.8 °F (36.6 °C) (!) 54 13 (!) 102/46 98 %   11/20/22 1545 -- 64 16 (!) 107/41 97 %   11/20/22 1542 -- 66 16 (!) 92/45 97 %   11/20/22 1535 97.8 °F (36.6 °C) 60 18 (!) 80/54 98 %   11/20/22 1530 97.8 °F (36.6 °C) 62 19 (!) 76/38 --   11/20/22 1500 -- 62 19 (!) 149/117 --   11/20/22 1450 -- 64 17 (!) 181/164 --   11/20/22 1440 -- 70 22 (!) 156/132 --   11/20/22 1430 -- 73 14 (!) 169/158 --   11/20/22 1415 -- 74 11 (!) 153/93 --   11/20/22 1400 -- 79 15 (!) 162/113 99 %   11/20/22 1300 -- 71 13 (!) 119/91 95 %   11/20/22 1200 97.8 °F (36.6 °C) 75 16 121/61 97 %   11/20/22 1145 -- 74 16 (!) 110/58 98 %   11/20/22 1130 -- 69 16 (!) 112/45 97 %   11/20/22 1129 -- 69 -- (!) 119/47 --   11/20/22 1115 -- 73 12 (!) 112/40 --   11/20/22 1100 -- 63 15 (!) 109/43 --   11/20/22 1045 -- 62 16 113/74 --   11/20/22 1030 -- 65 14 (!) 120/42 --   11/20/22 1015 -- (!) 57 19 (!) 54/45 --   11/20/22 0945 -- 63 24 (!) 194/147 --   11/20/22 0915 -- 60 18 (!) 178/156 --          Weight change:      11/19 1901 - 11/21 0700  In: 992.9 [P.O.:240;  I.V.:752.9]  Out: -     Intake/Output Summary (Last 24 hours) at 11/21/2022 0910  Last data filed at 11/21/2022 0700  Gross per 24 hour   Intake 736.8 ml   Output --   Net 736.8 ml       Physical Exam:   General: comfortable, no acute distress   HEENT sclera anicteric, supple neck, no thyromegaly  CVS: S1S2 heard,  no rub  RS: + air entry b/l,   Abd: Soft, Non tender, Not distended, Positive bowel sounds, no organomegaly, no CVA / supra pubic tenderness  Neuro: non focal, awake, alert , CN II-XII are grossly intact  Extrm: no edema, no cyanosis, clubbing   Skin: no visible  Rash  Musculoskeletal: No gross joints or bone deformities         Data Review:     LABS:   Hematology:   Recent Labs     11/21/22  0625 11/20/22  0330 11/19/22  1000   WBC 8.8 8.0 9.1   HGB 7.4* 7.3* 7.9*   HCT 22.2* 22.3* 24.3*       Chemistry:   Recent Labs     11/21/22  0625 11/20/22  0330 11/19/22  1000   BUN 52* 45* 42*   CREA 12.50* 11.50* 11.00*   CA 8.5 8.6 8.7   K 6.1* 5.1 5.3   * 128* 129*   CL 96* 97* 98*   CO2 19* 18* 19*   PHOS 7.4* 7.1* 6.6*   GLU 94 87 106*              Procedures/imaging: see electronic medical records for all procedures, Xrays and details which were not copied into this note but were reviewed prior to creation of Plan          Assessment & Plan:       See above      Tony Guillen MD  11/21/2022

## 2022-11-21 NOTE — PROGRESS NOTES
Palliative Medicine follow-up note    Patient Name: Nanda Barnard  YOB: 1945    Date of Initial Consult: November 15, 2022  Date of service: 11/21/2022  Reason for Consult: goals of care discussion and hospice discussion  Requesting Provider: ICU team  Primary Care Physician: Nurys Beebe MD      SUMMARY:     Nanda Barnard is a 76 y.o. with a past history of chronic hypotension, right hydronephrosis with stent, depression, A. fib, diabetes with neuropathy and ESRD on hemodialysis, who was admitted on 11/14/2022 from home with a diagnosis of acute on chronic hypotension, symptomatic bradycardia, diarrhea and hallucination. Reportedly, patient had nausea vomiting and diarrhea since She was started on digoxin. She started having significant weakness and decided to come to the emergency room. In the emergency room patient was noted to have hypotension and bradycardia. Was started on Levophed and dopamine. Patient expressed her wish to ICU team about stopping all the treatment and wanted to talk to hospice. Current medical issues leading to Palliative Medicine involvement include: To discuss goals of care including hospice discussion. 11/21/22: Patient was seen in presence of her family members and palliative care staff members including RN and medical social worker at bedside. Patient recognizes. Patient continues to have some shortness of breath and chest tightness. Denies any nausea or vomiting. Patient gave us permission to talk to her in presence of her family members. She is awake and oriented x3. She has made her mind to transition her care to inpatient comfort measures to begin with so we can set up hospice upon discharge. 11/18/22: Patient was seen in presence of palliative care RN. Patient is awake. Denies having any pain. No nausea vomiting. No headaches or dizziness.   Patient requested me to talk to her son as she feels confused to make further decisions but she is sure that she wants to go home on hospice and does not want to continue dialysis upon discharge. 11/17/22: Patient was seen in presence of palliative care RN. Patient is awake. However she stated she had a rough night and required some anxiety medication. She feels she is under effect of these medications currently. Denies any chest pain or shortness of breath. No nausea or vomiting. No headaches or dizziness. She remains on Levophed and dopamine drips. 11/16/22: Patient was seen in presence of palliative care RN. Patient denies any headaches or dizziness currently. No chest pain or abdominal pain currently. Off-and-on shortness of breath present. No nausea or vomiting. She states she talked to her son yesterday about her wishes. She also expresses her wish about not having pacemaker. Patient is currently on Levophed and dopamine drips. 11/15/22: Patient was seen in presence of palliative care staff members. Patient is able to carry on conversation without any issues. Denies headaches or dizziness. Denies any chest pain or abdominal pain. Dyspnea on exertion present. No nausea or vomiting currently. She states she has been on dialysis for last 2 years. She lives with her son and daughter-in-law. Her dialysis doctor's name is Dr. Charles Neri. PALLIATIVE DIAGNOSES:   Goals of care discussion with hospice care discussion  2. ESRD on hemodialysis  3. Acute on chronic hypotension requiring IV pressors  4. Sinus bradycardia  5. Poor functional status  6. Comfort measures only.       Patient Active Problem List   Diagnosis Code    History of acute pyelonephritis Z87.448    History of infection with vancomycin resistant Enterococcus (VRE) Z86.19    Anemia in end-stage renal disease (HCC) N18.6, D63.1    Chronic hypotension I95.89    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) E11.22, N18.6    Secondary hyperparathyroidism of renal origin ([facility] [address] N25.81    Gastroesophageal reflux disease K21.9    History of recurrent urinary tract infection Z87.440    History of sepsis Z86.19    End-stage renal disease on hemodialysis ([facility]) N18.6, Z99.2    History of hydronephrosis Z87.448    History of septic shock Z86.19    Anticoagulated by anticoagulation treatment Z79.01    Paroxysmal atrial fibrillation ([facility]) I48.0    Closed fracture of left inferior pubic ramus, with routine healing, subsequent encounter S32.592D    Closed nondisplaced fracture of anterior wall of left acetabulum with routine healing S32.415D    Closed fracture of superior pubic ramus, left, with routine healing, subsequent encounter S32.512D    Multiple closed pelvic fractures without disruption of pelvic ring ([address] S32.82XA    Depression F32. A    History of urinary tract infection Z87.440    Need for prophylactic isolation Z41.8    Hyperlipidemia E78.5    Hypothyroidism E03.9    History of kidney stones Z87.442    Chronic pain G89.29    Lung mass R91.8    History of urethral stricture Z87.448    Uric acid nephrolithiasis N20.0    Urinary incontinence R32    Glaucoma H40.9    Hyperphosphatemia E83.39    Mononeuropathy G58.9    Hyperkalemia E87.5    Gross hematuria R31.0    Impaired mobility and ADLs Z74.09, Z78.9    Stricture of female urethra N35.92    ESRD on dialysis ([facility]) N18.6, Z99.2    SOB (shortness of breath) on exertion R06.02    A-fib ([facility]) I48.91    Presence of Watchman left atrial appendage closure device Z95.818    Ureteral stricture N13.5    Bradycardia R00.1    Hypotension I95.9          GOALS OF CARE / TREATMENT PREFERENCES:     GOALS OF CARE:    11/21/22: Patient was seen in presence of her family members and palliative care team members. Patient recognizes and gave us permission to talk to her in presence of her family members. We introduced ourselves to family members. Patient was made aware that medical team was unable to wean her off IV pressors over the weekend.   As per patient's son, they spend enough time with her over the weekend and they have decided to go with patient's wish. I explained all of them including patient that once we stop IV pressors, there could be 2 possibilities. One possibility that she remained stable and we may be able to get her home with hospice if her symptoms are controlled. Second possibility, she can become hemodynamically unstable and we may have to admit her for inpatient hospice care. They verbalized understanding about it. They also stated the patient has decided not to go for a pacemaker placement and they understand that we will be stopping dialysis. We will change patient's IV antibiotics to p.o. antibiotic for next 10 days to treat her bacteremia. We will stop IV pressors and continue midodrine to help with her blood pressure. We will also start patient on as needed morphine and Ativan for keeping her as comfortable as possible for her uncontrolled symptoms like shortness of breath and anxiety. Comfort order set will be initiated. I also reached out to hospice to evaluate this patient for inpatient hospice care for uncontrolled symptoms like shortness of breath, anxiety and chest tightness. Patient's family was also informed that the patient remained stable on comfort measures overnight, the plan is to send her home with hospice and they verbalized understanding about it. ICU team was notified about it. Plan: Patient will remain DNR/DNI, comfort measures only, hospice consultation to evaluate this patient for GIP as well as to arrange home hospice, patient and family understands patient's poor prognosis. 11/18/22: Patient was seen in presence of palliative care RN. Patient is more awake today. She recognizes. She remains on Levophed and dopamine drips. Patient is clear that she does not want to continue dialysis upon discharge and does not want to have a pacemaker. However she is unsure whether we can stop trips today or not.   I discussed with patient's about consequences of stopping Levophed and dopamine drips in form of worsening bradycardia and hypotension. She verbalized understanding about it. She stated it is too much for her to digest and requested me to talk to her son. I called patient's son and be discussed with him about conversation we had with the patient. We decided to continue weaning this patient off drips if possible over the weekend, family will come to visit her, son and daughter-in-law will talk to patient over the weekend to make sure she understands the consequences of stopping drips if we are unable to wean her off, and will make plan to stop trips on Monday and transition her to comfort measures if we are unable to get her off drips over the weekend. Son is in agreement with this plan. We have set up time around 10 AM on Monday to make these changes. He is in agreement with it. I have informed him, once we stop drip and patient remained stable, she can come home with hospice. Otherwise, patient will remain in the hospital with comfort measures and may not be able to come home if she becomes unstable. He verbalized understanding about it. ICU team has been notified about it. Plan: Patient will remain DNR/DNI, limited intervention, plan is to wean patient off dopamine and Levophed drips over the weekend, family will visit the patient, and plan to stop trips around 10 AM on Monday if we are unable to wean her off over the weekend unless family/patient change her mind over the weekend. If patient remains stable after stopping drip on Monday, we will try to discharge patient home with hospice. If she becomes unstable, patient will remain in the hospital with comfort measures. 11/17/22: Patient was seen in presence of palliative care RN.   The reason be followed up on her today is because patient is still on Levophed and dopamine drips in ICU attending is unsure what direction we need to head towards as they have a hard time to wean her off drips. We saw this patient and she stated she required some anxiety medications. She recognized us. We discussed with her about potentially stopping Levophed and dopamine drips and may have to start her on comfort measures here as the medical team is unable to wean her off these medications. She verbalized understanding about it. We also discussed with her about becoming more hypotensive and bradycardic when we stop drips. However, patient stated that it is too much for her to decide about an would like us to continue require medication, dialysis at this point. She wants some time to think over it. She stated she is sure that she does not want to pursue dialysis outpatient and will be going home with hospice but she is not sure to start comfort measures here currently. She also stated that her son was here earlier this morning. I informed patient to notify ICU team if she change her mind and to consider starting her on comfort measures here. She verbalized understanding about it. ICU team has been notified about it. Dr. Staci White stated he will try to talk to patient's son if he sees him here today. We will continue to follow this patient with you. Plan: DNR/DNI, limited interventions, continue current management in form of Levophed and dopamine drips, resumption of dialysis as patient does not want to go on comfort measures currently. 11/16/22: Patient was seen in presence of palliative care RN. She is awake and oriented x3. She remembers me from previous visit. She knows the purpose of our visit. She stated that her son came to visit her yesterday and she already informed him what she wants. She understands the consequences of stopping dialysis and not having pacemaker. She stated she wants to go home with hospice. She gave me permission to talk to her son. I spoke to patient's son over the phone and informed him about my conversation with the patient.   He stated that they would like to start hospice upon discharge and until then they would like to continue current medical treatment and he also understands that they do not want her mother to have a pacemaker. I informed him that we will try to wean her off medication and see she remained stable if not, we will talk to his mom and him on Friday about inpatient comfort measures. He verbalized understanding about it. I went back to inform patient about it and she also completed the post for hospice upon discharge. I called patient's nephrologist and informed him about patient's decision stopping dialysis upon discharge. Patient also requested me to put her on some medications especially for anxiety and shortness of breath on exertion. Patient will be started on Ativan and morphine as needed. Plan: Patient is DNR/DNI, limited intervention until she stays here, hospice upon discharge. 11/15/22: Patient was seen in presence of palliative care staff members. Patient is awake and oriented x3. We introduced ourselves and explained her the purpose of our visit. Patient is able to carry on conversation. Patient has AMD and post form on the file. She wants her son and daughter-in-law to be medical power of  and understands the meaning of DNR/DNI. She wants to continue with current AMD in the post form. Patient stated that she is tired of being on dialysis and keep coming back and forth to the hospital.  We discussed with her about consequences of stopping dialysis including worsening symptoms in form of shortness of breath, delirium, nausea/vomiting etc.  She verbalized understanding about it. She said she has been on dialysis for last 2 years and does not think it is helping her. She lives with her son. She wants us to call her son to discuss the options about hospice at home versus nursing home. She is interested in talking to hospice for informational purposes only at this point.   She also mentioned that she will talk to her son and daughter-in-law about her wheezes. She gave us permission to talk to her son. I called patient's son and informed him about the conversation we had with his mother. As per son: Patient goes in this phase off and on and wanted to make sure she understands the consequences of stopping dialysis. He stated he and his wife will come later on today to talk to patient but requested us not to start any other process until they talk to his mother. He is fine having hospice to provide information only. I did state to him that this is his mother's wish but we will hold on making any other decisions until he and his wife talk to patient and we will follow-up with patient tomorrow morning. He is fine with it. Plan: Patient will remain DNR/DNI, limited intervention at this point, hospice consult for information purposes only, will continue to follow this patient with you. TREATMENT PREFERENCES:   Code Status: DNR    Advance Care Planning:  Advance Care Planning 11/16/2022   Patient's Healthcare Decision Maker is: Named in scanned ACP document   Confirm Advance Directive Yes, on file   Patient Would Like to Complete Advance Directive -   Does the patient have other document types MOST/MOLST/POST/POLST          Other Instructions: PT and OT        HISTORY:     History obtained from: Patient    CHIEF COMPLAINT: Nausea vomiting and diarrhea    HPI/SUBJECTIVE:    The patient is:   [x] Verbal and participatory  [] Non-participatory due to:         FUNCTIONAL ASSESSMENT:     Palliative Performance Scale (PPS):40       Clinical Pain Assessment (nonverbal scale for severity on nonverbal patients): Adult Nonverbal Pain Scale  Face: Occasional grimace, tearing, frowning, wrinkled forehead  Activity (Movement): Restless, excessive activity and/or withdrawal reflexes  Guarding: Splinting areas of the body, tense  Physiology (Vital Signs):  Stable vital signs  Respiratory: Baseline RR/SpO2 compliant with ventilator  Total Score: 4     PSYCHOSOCIAL/SPIRITUAL SCREENING:       Any spiritual / Orthodox concerns:  [] Yes /  [x] No    Caregiver Burnout:  [] Yes /  [x] No /  [] No Caregiver Present      Anticipatory grief assessment:   [x] Normal  / [] Maladaptive          REVIEW OF SYSTEMS:     Positive and pertinent negative findings in ROS are noted above in HPI. The following systems were [x] reviewed / [] unable to be reviewed as noted in HPI  Other findings are noted below. Systems: constitutional, ears/nose/mouth/throat, respiratory, gastrointestinal, genitourinary, musculoskeletal, integumentary, neurologic, psychiatric, endocrine. Positive findings noted below. Modified ESAS Completed by: provider                                            PHYSICAL EXAM:     From RN flowsheet:  Wt Readings from Last 3 Encounters:   11/14/22 85 kg (187 lb 6.3 oz)   11/08/22 91.6 kg (202 lb)   11/01/22 91.6 kg (202 lb)       BP (!) 187/171   Pulse 70   Temp 97 °F (36.1 °C)   Resp 14   Ht 5' 2\" (1.575 m)   Wt 85 kg (187 lb 6.3 oz)   SpO2 100%   BMI 34.27 kg/m²     Constitutional: Awake, NAD, follows verbal commands appropriately  HEENT: Atraumatic, normocephalic, oral mucosa moist.  Neck: No JVD, trachea is midline  Cardiovascular: S1 S2 heard, no murmur appreciated by me. Respiratory: Diminished breath sound bibasilar without any wheezes currently  Gastrointestinal: soft non-tender, +bowel sounds, nondistended  Musculoskeletal: No pitting pedal edema  Neurologic: following verbal commands, moving all extremities, no tremors noted today.   Psychiatric: full affect, no hallucinations, no agitation  Other:     HISTORY:     Past Medical History:   Diagnosis Date    Anemia in end-stage renal disease (HCC)     Anticoagulated by anticoagulation treatment     On Apixaban    Chronic hypotension     On Midodrine    Chronic kidney disease     ESRD on HD MWF    Chronic pain     Closed fracture of left inferior pubic ramus, with routine healing, subsequent encounter 11/12/2021    Closed fracture of superior pubic ramus, left, with routine healing, subsequent encounter 11/12/2021    Closed nondisplaced fracture of anterior wall of left acetabulum with routine healing 11/12/2021    Depression     End-stage renal disease on hemodialysis ([address]     HD at [address] [facility] on MWF.  Tel # [phone]    Gastroesophageal reflux disease     Glaucoma     History of acute pyelonephritis 02/20/2020    History of hydronephrosis 10/05/2021    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    Urine culture (collected 10/8/2021, resulted 10/14/2021) yielded growth of >100,000 colonies/ml of Enterococcus faecalis RESISTANT to Ciprofloxacin, Levofloxacin, Tetracycline and Vancomycin    History of kidney stones     History of recurrent urinary tract infection     History of sepsis 06/18/2021    History of septic shock 10/08/2021    History of urethral stricture     History of urinary tract infection 10/08/2021    Urine culture (collected 10/8/2021, resulted 10/14/2021) yielded growth of >100,000 colonies/ml of Enterococcus faecalis RESISTANT to Ciprofloxacin, Levofloxacin, Tetracycline and Vancomycin    Hyperlipidemia     Hyperphosphatemia 11/14/2021    Hypothyroidism     Lung mass     Mononeuropathy     Involving ring finger of left hand    Need for prophylactic isolation 10/08/2021    Urine culture (collected 10/8/2021, resulted 10/14/2021) yielded growth of >100,000 colonies/ml of Enterococcus faecalis RESISTANT to Ciprofloxacin, Levofloxacin, Tetracycline and Vancomycin    Osteoporosis     Paroxysmal atrial fibrillation ([address]     Secondary hyperparathyroidism of renal origin ([address]     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 ([address]     HbA1c (10/8/2021) = 4.6    Uric acid nephrolithiasis     Urinary incontinence       Past Surgical History:   Procedure Laterality Date    HX APPENDECTOMY      HX CHOLECYSTECTOMY HX GASTRIC BYPASS      Gastric stapling    HX KNEE ARTHROSCOPY      HX UROLOGICAL      right PCN placement    HX UROLOGICAL  07/23/2018    RIGHT URETEROSCOPY WITH HOLMIUM LASER    IR EXCHANGE NEPHRO PERC LT SI  2/21/2020    IR EXCHANGE NEPHRO PERC RT SI  4/13/2020    IR EXCHANGE NEPHRO PERC RT SI  7/17/2020    IR NEPHROSTOMY PERC RT PLC CATH  SI  10/14/2020    IR NEPHROURETERAL PERC RT PLC CATH NEW ACCESS  SI  4/30/2020    OR INTRO CATH DIALYSIS CIRCUIT DX ANGRPH FLUOR S&I Left 9/24/2020    FISTULOGRAM LEFT/poss permanent catheter placement performed by Ann Montgomery MD at [facility] CATH LAB    VASCULAR SURGERY PROCEDURE UNLIST      lef AVF      Family History   Problem Relation Age of Onset    Heart Surgery Sister       History reviewed, no pertinent family history.   Social History     Tobacco Use    Smoking status: Never    Smokeless tobacco: Never   Substance Use Topics    Alcohol use: Never     Allergies   Allergen Reactions    Albumin, Human 25 % Itching     Headache - severe migraine like, itchy eyes, runny nose    Ciprofloxacin Hives    Cyclopentolate Unknown (comments)    Iron Sucrose Diarrhea    Statins-Hmg-Coa Reductase Inhibitors Other (comments)     Body ache      Current Facility-Administered Medications   Medication Dose Route Frequency    ondansetron (ZOFRAN ODT) tablet 4 mg  4 mg Oral Q6H PRN    LORazepam (INTENSOL) 2 mg/mL oral concentrate 1 mg  1 mg SubLINGual Q2H PRN    LORazepam (ATIVAN) injection 0.5 mg  0.5 mg IntraVENous ONCE    acetaminophen (TYLENOL) tablet 650 mg  650 mg Oral Q4H PRN    Or    acetaminophen (TYLENOL) solution 650 mg  650 mg Oral Q4H PRN    Or    acetaminophen (TYLENOL) suppository 650 mg  650 mg Rectal Q4H PRN    scopolamine (TRANSDERM-SCOP) 1 mg over 3 days 1 Patch  1 Patch TransDERmal Q72H PRN    hyoscyamine SL (LEVSIN/SL) tablet 0.125 mg  0.125 mg SubLINGual Q4H PRN    bisacodyL (DULCOLAX) suppository 10 mg  10 mg Rectal DAILY PRN    albuterol (PROVENTIL VENTOLIN) nebulizer solution 2.5 mg  2.5 mg Nebulization Q2H PRN    morphine (ROXANOL) 20 mg/mL concentrated solution 2.6-5 mg  2.6-5 mg SubLINGual Q1H PRN    haloperidol (HALDOL) 2 mg/mL oral solution 2 mg  2 mg SubLINGual Q6H PRN    Or    haloperidol lactate (HALDOL) injection 2 mg  2 mg IntraVENous Q6H PRN    levoFLOXacin (LEVAQUIN) tablet 500 mg  500 mg Oral Q48H    cefpodoxime (VANTIN) tablet 200 mg  200 mg Oral Q12H    melatonin tablet 5 mg  5 mg Oral QHS    diphenhydrAMINE (BENADRYL) capsule 25 mg  25 mg Oral Q6H PRN    midodrine (PROAMATINE) tablet 10 mg  10 mg Oral TID WITH MEALS    morphine IR (MS IR) tablet 7.5 mg  7.5 mg Oral DAILY PRN    DOPamine (INTROPIN) 800 mg in dextrose 5% 500 mL infusion  3-20 mcg/kg/min IntraVENous TITRATE    insulin lispro (HUMALOG) injection   SubCUTAneous AC&HS    glucose chewable tablet 16 g  4 Tablet Oral PRN    glucagon (GLUCAGEN) injection 1 mg  1 mg IntraMUSCular PRN    dextrose 10% infusion 0-250 mL  0-250 mL IntraVENous PRN    aspirin chewable tablet 81 mg  81 mg Oral DAILY    clopidogreL (PLAVIX) tablet 75 mg  75 mg Oral DAILY    sodium chloride (NS) flush 5-40 mL  5-40 mL IntraVENous PRN    polyethylene glycol (MIRALAX) packet 17 g  17 g Oral DAILY PRN    ondansetron (ZOFRAN) injection 4 mg  4 mg IntraVENous Q6H PRN    DULoxetine (CYMBALTA) capsule 20 mg  20 mg Oral DAILY        LAB AND IMAGING FINDINGS:     Recent Results (from the past 24 hour(s))   GLUCOSE, POC    Collection Time: 11/20/22 11:46 AM   Result Value Ref Range    Glucose (POC) 121 (H) 70 - 110 mg/dL   GLUCOSE, POC    Collection Time: 11/20/22  5:22 PM   Result Value Ref Range    Glucose (POC) 100 70 - 110 mg/dL   GLUCOSE, POC    Collection Time: 11/20/22 10:40 PM   Result Value Ref Range    Glucose (POC) 89 70 - 110 mg/dL   CBC WITH AUTOMATED DIFF    Collection Time: 11/21/22  6:25 AM   Result Value Ref Range    WBC 8.8 4.6 - 13.2 K/uL    RBC 2.20 (L) 4.20 - 5.30 M/uL    HGB 7.4 (L) 12.0 - 16.0 g/dL    HCT 22.2 (L) 35.0 - 45.0 %    .9 (H) 78.0 - 100.0 FL    MCH 33.6 24.0 - 34.0 PG    MCHC 33.3 31.0 - 37.0 g/dL    RDW 16.1 (H) 11.6 - 14.5 %    PLATELET 835 717 - 956 K/uL    MPV 9.7 9.2 - 11.8 FL    NRBC 0.0 0  WBC    ABSOLUTE NRBC 0.00 0.00 - 0.01 K/uL    NEUTROPHILS 69 40 - 73 %    LYMPHOCYTES 12 (L) 21 - 52 %    MONOCYTES 15 (H) 3 - 10 %    EOSINOPHILS 3 0 - 5 %    BASOPHILS 1 0 - 2 %    IMMATURE GRANULOCYTES 2 (H) 0.0 - 0.5 %    ABS. NEUTROPHILS 6.1 1.8 - 8.0 K/UL    ABS. LYMPHOCYTES 1.0 0.9 - 3.6 K/UL    ABS. MONOCYTES 1.3 (H) 0.05 - 1.2 K/UL    ABS. EOSINOPHILS 0.2 0.0 - 0.4 K/UL    ABS. BASOPHILS 0.1 0.0 - 0.1 K/UL    ABS. IMM.  GRANS. 0.2 (H) 0.00 - 0.04 K/UL    DF AUTOMATED     MAGNESIUM    Collection Time: 11/21/22  6:25 AM   Result Value Ref Range    Magnesium 2.4 1.6 - 2.6 mg/dL   METABOLIC PANEL, BASIC    Collection Time: 11/21/22  6:25 AM   Result Value Ref Range    Sodium 127 (L) 136 - 145 mmol/L    Potassium 6.1 (HH) 3.5 - 5.5 mmol/L    Chloride 96 (L) 100 - 111 mmol/L    CO2 19 (L) 21 - 32 mmol/L    Anion gap 12 3.0 - 18 mmol/L    Glucose 94 74 - 99 mg/dL    BUN 52 (H) 7.0 - 18 MG/DL    Creatinine 12.50 (H) 0.6 - 1.3 MG/DL    BUN/Creatinine ratio 4 (L) 12 - 20      eGFR 3 (L) >60 ml/min/1.73m2    Calcium 8.5 8.5 - 10.1 MG/DL   PHOSPHORUS    Collection Time: 11/21/22  6:25 AM   Result Value Ref Range    Phosphorus 7.4 (H) 2.5 - 4.9 MG/DL   BLOOD GAS,CHEM8,LACTIC ACID POC    Collection Time: 11/21/22  9:15 AM   Result Value Ref Range    Calcium, ionized (POC) 1.11 (L) 1.12 - 1.32 mmol/L    Base deficit (POC) 10.4 mmol/L    HCO3 (POC) 15.3 (L) 22 - 26 MMOL/L    CO2, POC 15 (L) 19 - 24 MMOL/L    O2 SAT 73 %    Sample source VENOUS BLOOD      Performed by Danyel Stahl     Sodium (POC) 126 (L) 136 - 145 mmol/L    Potassium (POC) 5.0 3.5 - 5.1 mmol/L    Glucose (POC) <20 (LL) 65 - 100 mg/dL    Creatinine (POC) 10.77 (H) 0.6 - 1.3 mg/dL    Lactic Acid (POC) 2.97 (HH) 0.40 - 2.00 mmol/L    Chloride (POC) 99 98 - 107 mmol/L    Anion gap, POC 13 10 - 20      pH, venous (POC) 7.28 (L) 7.32 - 7.42      pCO2, venous (POC) 32.4 (L) 41 - 51 MMHG    pO2, venous (POC) 43 (H) 25 - 40 mmHg    Critical value read back 7    GLUCOSE, POC    Collection Time: 11/21/22  9:25 AM   Result Value Ref Range    Glucose (POC) 75 70 - 110 mg/dL             Total time to take care of this patient was 35 minutes and more than 50% of time was spent counseling and coordinating care. Disclaimer: Sections of this note are dictated using utilizing voice recognition software, which may have resulted in some phonetic based errors in grammar and contents. Even though attempts were made to correct all the mistakes, some may have been missed, and remained in the body of the document. If questions arise, please contact our department.

## 2022-11-21 NOTE — PROGRESS NOTES
701 [facility] pt care, report received from MACK Yanez RN.  3261  Pt very confused, restless and agitated, pt gets frustrated when trying to speak. Pt hallucinating, keeps stating not to tell \"Gokul\" anything, pt also c/o back pain and SOB, increased HOB, reoriented pt to time, place, and situation. 0008  Admin Morphine 7.5 oral for back pain, pt unable to rate pain. 0100  Pt resting with eyes closed. 0630  Labs drawn from L IJ CVL and sent to lab.  0715  Bedside shift change report given to Jose Raul Farah RN (oncoming nurse) by MACK Osullivan RN (offgoing nurse). Report included the following information SBAR, Intake/Output, MAR, Recent Results, Med Rec Status, and Cardiac Rhythm Afib.

## 2022-11-21 NOTE — PROGRESS NOTES
Comprehensive Nutrition Assessment    Type and Reason for Visit: Initial, RD nutrition re-screen/LOS    Nutrition Recommendations/Plan:   Noted comfort measures only. Continue current diet as tolerated and nutrition interventions per goals of care. Monitor PO intake, weight, labs and plan of care during admission. Malnutrition Assessment:  Malnutrition Status:  Insufficient data (11/21/22 1053)    Context:  Chronic illness       Nutrition History and Allergies: PMHx: chronic hypotension, right hydonephrosis with stent, depression, afibb, DM2 with neuropathy, ESRD on HD. Wt hx: 207 lb (5/2/22), 202 lb (8/30/22), wt loss of 7.4% x 3 months per EMR hx. Per MD note 11/14: Son reports poor intake for mos with associated n/v. NKFA. Nutrition Assessment:    Admitted w/ c/o pain, weakness, and SOB on 11/14. Per Palliative Care: Pt remains DNR/DNI. Transition to comfort measures at discharge. Pt wishes to go home with the support of hospice. No more dialysis per pt wishes. Discussed care during interdisciplinary rounds. Per RN, held PO today due to confusion. Comfort measures today per MD, plan to order IVF. Nutrition Related Findings:    Pertinent Meds: cefepime, cymbalta, folic acid, humalog, melatonin, levo @ 4 mcg/min   Pertinent Labs: Na 127 L, K 6.1 H, BUN/Cr 52/12.5, GFR 3, Phos 7.4 H Wound Type: None    Current Nutrition Intake & Therapies:     Average Supplement Intake: None ordered  ADULT DIET Regular; 1500 ml    Anthropometric Measures:  Height: 5' 2\" (157.5 cm)  Ideal Body Weight (IBW): 110 lbs (50 kg)  Admission Body Weight: 187 lb 6.3 oz (85 kg)  Current Body Wt:  85 kg (187 lb 6.3 oz), 170.4 % IBW.     Current BMI (kg/m2): 34.3    Estimated Daily Nutrient Needs:  Energy Requirements Based On: Formula  Weight Used for Energy Requirements: Admission  Energy (kcal/day): 5680-7023 (MSJ 1.1-1.2)  Weight Used for Protein Requirements: Ideal  Protein (g/day):  (1.5-2 g/day)  Method Used for Fluid Requirements: Standard renal  Fluid (ml/day): 750-1500    Nutrition Diagnosis:   Inadequate oral intake related to acute injury/trauma, early satiety as evidenced by intake 0-25%, poor intake prior to admission    Nutrition Interventions:   Food and/or Nutrient Delivery: Continue current diet     Coordination of Nutrition Care: Interdisciplinary rounds  Plan of Care discussed with: interdisciplinary rounds    Goals:     Goals: Meet at least 75% of estimated needs, by next RD assessment       Nutrition Monitoring and Evaluation:      Food/Nutrient Intake Outcomes: Food and nutrient intake  Physical Signs/Symptoms Outcomes: Meal time behavior, Biochemical data, Weight, GI status    Discharge Planning:    Continue current diet    Geralynn Harada, [address], [address]   Contact: [phone]

## 2022-11-21 NOTE — DIABETES MGMT
Glycemic Control:  Pt with  history of Diabetes. Pt's blood glucose readings are at the low end of the target range, with one BG <20mg/dl. Comfort Care status noted.   Recent Glucose Results:   Lab Results   Component Value Date/Time    GLU 94 11/21/2022 06:25 AM    GLUCPOC 75 11/21/2022 09:25 AM    GLUCPOC <20 (LL) 11/21/2022 09:15 AM    GLUCPOC 89 11/20/2022 10:40 PM    GLUCPOC 100 11/20/2022 05:22 PM     Lab Results   Component Value Date/Time    Hemoglobin A1c <3.8 (L) 11/15/2022 04:00 AM       Saint Copes MS RD CNSC BC-ADM

## 2022-11-21 NOTE — PROGRESS NOTES
[facility]. Pulmonary, Critical Care, and Sleep Medicine    Name: Monique Sparks MRN: [id number]   : 1945 Hospital: [address] [place] [address] Dr   Date: 2022  Admission Date: 2022     Chart and notes reviewed. Data reviewed. I have evaluated all findings. [x]I have reviewed the flowsheet and previous days notes. []The patient is unable to give any meaningful history or review of systems because the patient is:  []Intubated []Sedated   []Unresponsive      []The patient is critically ill on      []Mechanical ventilation []Pressors   []BiPAP []         Interval HPI:  Patient is a 76 y.o. female w/ PMH of chronic hypotension, right hydonephrosis with stent, depression, afibb, DM2 with neuropathy, ESRD on HD presenting to [facility] with c/o pain, weakness, and SOB on . Patient reports 3 days of diarrhea PTA. Son reports poor intake for mos with associated n/v. Son reports worsening decline since starting digoxin. Patient is currently complaining of leg cramps/ pain and restlessness. Also complaining of presyncope/lightheaded for a few weeks. Subjective 22  Hospital Day:7  Vent Day:n/a  Overnight events:Pt became agitated, confused overnight. Received PRN Ativan. Mentation/Activity: Alert, oriented to self and place. Intermittent agitation and confusion,  Respiratory/ Secretions:Nasal cannula at 2 lpm  Hemodynamics:Junctional rhythm on Dopamine and Levophed  Urine output, bowel: Anuric, declines  HD treatment  Diet:Regular diet, no nausea complaint at this time  Need for procedures:n/a              ROS:Pertinent items are noted in HPI. Events and notes from last 24 hours reviewed.      Patient Active Problem List   Diagnosis Code    History of acute pyelonephritis Z87.448    History of infection with vancomycin resistant Enterococcus (VRE) Z86.19    Anemia in end-stage renal disease (HCC) N18.6, D63.1    Chronic hypotension I95.89    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 ([facility]) E11.22, N18.6    Secondary hyperparathyroidism of renal origin ([facility] N25.81    Gastroesophageal reflux disease K21.9    History of recurrent urinary tract infection Z87.440    History of sepsis Z86.19    End-stage renal disease on hemodialysis ([facility]) N18.6, Z99.2    History of hydronephrosis Z87.448    History of septic shock Z86.19    Anticoagulated by anticoagulation treatment Z79.01    Paroxysmal atrial fibrillation ([facility]) I48.0    Closed fracture of left inferior pubic ramus, with routine healing, subsequent encounter S32.592D    Closed nondisplaced fracture of anterior wall of left acetabulum with routine healing S32.415D    Closed fracture of superior pubic ramus, left, with routine healing, subsequent encounter S32.512D    Multiple closed pelvic fractures without disruption of pelvic ring ([facility] S32.82XA    Depression F32. A    History of urinary tract infection Z87.440    Need for prophylactic isolation Z41.8    Hyperlipidemia E78.5    Hypothyroidism E03.9    History of kidney stones Z87.442    Chronic pain G89.29    Lung mass R91.8    History of urethral stricture Z87.448    Uric acid nephrolithiasis N20.0    Urinary incontinence R32    Glaucoma H40.9    Hyperphosphatemia E83.39    Mononeuropathy G58.9    Hyperkalemia E87.5    Gross hematuria R31.0    Impaired mobility and ADLs Z74.09, Z78.9    Stricture of female urethra N35.92    ESRD on dialysis ([facility]) N18.6, Z99.2    SOB (shortness of breath) on exertion R06.02    A-fib ([facility]) I48.91    Presence of Watchman left atrial appendage closure device Z95.818    Ureteral stricture N13.5    Bradycardia R00.1    Hypotension I95.9       Vital Signs:  Visit Vitals  BP (!) 114/93   Pulse 66   Temp 98.2 °F (36.8 °C)   Resp 14   Ht 5' 2\" (1.575 m)   Wt 85 kg (187 lb 6.3 oz)   SpO2 (!) 74%   BMI 34.27 kg/m²       O2 Device: None (Room air)   O2 Flow Rate (L/min): 2 l/min   Temp (24hrs), Av.2 °F (36.8 °C), Min:97.8 °F (36.6 °C), Max:100.3 °F (37.9 °C)       Intake/Output:   Last shift:      11/20 1901 - 11/21 0700  In: 83.6 [I.V.:83.6]  Out: -   Last 3 shifts: 11/19 0701 - 11/20 1900  In: 1252.8 [P.O.:240;  I.V.:1012.8]  Out: -     Intake/Output Summary (Last 24 hours) at 11/20/2022 2357  Last data filed at 11/20/2022 2300  Gross per 24 hour   Intake 767.11 ml   Output --   Net 767.11 ml          Current Facility-Administered Medications   Medication Dose Route Frequency    [START ON 11/22/2022] famotidine (PF) (PEPCID) 20 mg in 0.9% sodium chloride 10 mL injection  20 mg IntraVENous Q48H    melatonin tablet 5 mg  5 mg Oral QHS    NOREPINephrine (LEVOPHED) 8 mg in 5% dextrose 250mL (32 mcg/mL) infusion  0.5-50 mcg/min IntraVENous TITRATE    midodrine (PROAMATINE) tablet 10 mg  10 mg Oral TID WITH MEALS    levoFLOXacin (LEVAQUIN) 500 mg in D5W IVPB  500 mg IntraVENous Q48H    DOPamine (INTROPIN) 800 mg in dextrose 5% 500 mL infusion  3-20 mcg/kg/min IntraVENous TITRATE    epoetin caitlin-epbx (RETACRIT) injection 8,000 Units  8,000 Units SubCUTAneous Q TUE, THU & SAT    insulin lispro (HUMALOG) injection   SubCUTAneous AC&HS    aspirin chewable tablet 81 mg  81 mg Oral DAILY    clopidogreL (PLAVIX) tablet 75 mg  75 mg Oral DAILY    cefepime (MAXIPIME) 1 g in 0.9% sodium chloride (MBP/ADV) 50 mL MBP  1 g IntraVENous Q24H    sodium chloride (NS) flush 5-40 mL  5-40 mL IntraVENous Q8H    heparin (porcine) injection 5,000 Units  5,000 Units SubCUTAneous Q8H    DULoxetine (CYMBALTA) capsule 20 mg  20 mg Oral DAILY    folic acid (FOLVITE) tablet 1 mg  1 mg Oral DAILY         Telemetry: []Sinus []A-flutter []Paced    []A-fib []Multiple PVCs                  Physical Exam:      General: awake, confused, restless  HEENT:  PERRL, EOMI, pink palpebral conjunctivae; mucosa moist  Resp:  Symmetrical chest expansion, no accessory muscle use; good airway entry; no rales/ wheezing/ rhonchi noted  CV:  S1, S2 present; regular rate and rhythm  GI:  Abdomen soft, non-tender; (+) active bowel sounds  Extremities:  tank radial/DP pulses intact, no tank LE edema/ cyanosis  Skin:  Warm, normal turgor/cap refill   Neurologic:  Alert, oriented to self and place,  moving all extremities spontaneously  Devices:  Left IJ CVL , PIV REJ      DATA:  MAR reviewed and pertinent medications noted or modified as needed    Labs:  Recent Labs     11/20/22  0330 11/19/22  1000 11/18/22  0400   WBC 8.0 9.1 11.3   HGB 7.3* 7.9* 8.0*   HCT 22.3* 24.3* 24.8*    239 228     Recent Labs     11/20/22  0330 11/19/22  1000 11/18/22  0400   * 129* 133*   K 5.1 5.3 4.2   CL 97* 98* 100   CO2 18* 19* 20*   GLU 87 106* 123*   BUN 45* 42* 32*   CREA 11.50* 11.00* 9.62*   CA 8.6 8.7 8.3*   MG 2.5 2.5 2.4   PHOS 7.1* 6.6* 5.7*     No results for input(s): PH, PCO2, PO2, HCO3, FIO2 in the last 72 hours. No results for input(s): FIO2I, IFO2, HCO3I, IHCO3, HCOPOC, PCO2I, PCOPOC, IPHI, PHI, PHPOC, PO2I, PO2POC in the last 72 hours. No lab exists for component: IPOC2    Imaging:  [x]   I have personally reviewed the patients radiographs and reports  XR Results (most recent):  XR Results (most recent):  Results from Hospital Encounter encounter on 11/14/22    XR CHEST PORT    Narrative  Portable CXR:    HISTORY: Central line placement. Comparison November 14, 2022. Left IJ catheter crosses midline, tip in the mid SVC. No left pneumothorax. Lungs are hypoinflated. Mild vascular congestion still suspected. No  consolidation. Eventration right hemidiaphragm, stable. Impression  No pneumothorax. Mild vascular congestion. Left IJ catheter tip in  the SVC. CT Results (most recent):  Results from Hospital Encounter encounter on 11/14/22    CTA CHEST ABD PELV W WO CONT    Narrative  CT angiogram aorta. CT chest, abdomen and pelvis without and with IV contrast.    HISTORY: Chest pain and back pain with hypotension.     All CT scans at this facility are performed using dose optimization technique as  appropriate to a performed exam, to include automated exposure control,  adjustment of the mA and/or kV according to patient size (including appropriate  matching for site specific examination) or use of iterative reconstruction  technique. Dialysis patient, to get dialysis the next morning. Axial CT images obtained. Sagittal and coronal MIP images of the aorta were  generated. Dedicated 3-D images of the aorta and its branches also generated on  a dedicated workstation. Sagittal and coronal images of abdomen and pelvis also  generated. CT angiogram aorta: Noncontrast study shows no intramural hematoma. Contrast  study shows normal caliber throughout. No aortic dissection. Moderate  atherosclerotic calcification present. No focal aneurysm. Some degree of  stenosis due to plaques in the celiac and SMA origins. PATRICIO is occluded, also  obscured by motion artifacts. Stenotic renal arteries. Iliac arteries are within  normal caliber. Minimal ectasia of the bifurcation. CT CHEST: No pulmonary infiltrate or consolidation. No pleural effusion or  pneumothorax. No mediastinal or hilar mass. No cardiomegaly or pericardial  effusion. Corticated calcification noted. No central pulmonary embolism. CT ABDOMEN/PELVIS: Liver and spleen unremarkable. Cholecystectomy. Small hiatal  hernia. Gastric surgery. Pancreas grossly unremarkable. No adrenal mass. Atrophic kidneys with cystic lesions. Double J ureteral stent on the right. No  surrounding inflammation. Small bowel and colon not distended. No extraluminal inflammation. Suspect  scattered colonic diverticulosis. No ascites or free air. Mild subcutaneous edema throughout. Small amount of excreted contrast material  in the decompressed bladder. Uterus unremarkable. No pelvic mass. Degenerative disease in the thoracolumbar spine. Old left inferior pubic ramal  fracture. Impression  1. No aortic aneurysm or dissection.  Moderately advanced atherosclerotic  disease. 2. No acute abnormalities in the chest, abdomen or pelvis. 08/15/22    ECHO CATINA W OR WO CONTRAST 08/15/2022 8/15/2022    Interpretation Summary    Left Ventricle: Normal left ventricular systolic function with a visually estimated EF of 55 - 60%. Left Atrium: No left atrial appendage thrombus noted. Watchman well-seated with no residual jet around the device. Signed by: Pepito Spangler MD on 8/15/2022 12:22 PM       IMPRESSION:   Acute on chronic hypotension (OP midodrine) requiring levophed and dopamine- likely secondary to complete heart block. Symptomatic bradycardia- since starting of digoxin in the last month  Hallucinations- due to morphine vs delirium, improving. Pt with confusion that is worse at nighttime.   Chronic nausea with poor PO intake, suspect gastroparesis, CT abd neg   Hx a fib s/p watchman procedure   ESRD, pt refusing dialysis  Hx hydronephrosis with chronic right ureteral stent, recent exchanged 11/8  DM2 with diabetic neuropathy, suspect DAN  Debility with functional weakness, needs assistance with ADLs  HX anxiety/depression     Patient Active Problem List   Diagnosis Code    History of acute pyelonephritis Z87.448    History of infection with vancomycin resistant Enterococcus (VRE) Z86.19    Anemia in end-stage renal disease (HCC) N18.6, D63.1    Chronic hypotension I95.89    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) E11.22, N18.6    Secondary hyperparathyroidism of renal origin ([facility] N25.81    Gastroesophageal reflux disease K21.9    History of recurrent urinary tract infection Z87.440    History of sepsis Z86.19    End-stage renal disease on hemodialysis (HCC) N18.6, Z99.2    History of hydronephrosis Z87.448    History of septic shock Z86.19    Anticoagulated by anticoagulation treatment Z79.01    Paroxysmal atrial fibrillation ([facility]) I48.0    Closed fracture of left inferior pubic ramus, with routine healing, subsequent encounter S32.592D    Closed nondisplaced fracture of anterior wall of left acetabulum with routine healing S32.415D    Closed fracture of superior pubic ramus, left, with routine healing, subsequent encounter S32.512D    Multiple closed pelvic fractures without disruption of pelvic ring ([address] S32.82XA    Depression F32. A    History of urinary tract infection Z87.440    Need for prophylactic isolation Z41.8    Hyperlipidemia E78.5    Hypothyroidism E03.9    History of kidney stones Z87.442    Chronic pain G89.29    Lung mass R91.8    History of urethral stricture Z87.448    Uric acid nephrolithiasis N20.0    Urinary incontinence R32    Glaucoma H40.9    Hyperphosphatemia E83.39    Mononeuropathy G58.9    Hyperkalemia E87.5    Gross hematuria R31.0    Impaired mobility and ADLs Z74.09, Z78.9    Stricture of female urethra N35.92    ESRD on dialysis ([facility]) N18.6, Z99.2    SOB (shortness of breath) on exertion R06.02    A-fib ([facility]) I48.91    Presence of Watchman left atrial appendage closure device Z95.818    Ureteral stricture N13.5    Bradycardia R00.1    Hypotension I95.9        RECOMMENDATIONS:   Neuro: Neuro checks per routine, Melatonin for sleep hygiene, Cymbalta, prn pain medication   Pulm: Supplemental O2 via NC, titrate flow for goal SPO2> 90%,  pulmonary hygiene care, Aspiration precautions, Keep HOB >30 degrees  CVS:  Actively titrate vasopressors aim systolic >37 mmHg. Titrate Dopamine for HR>65, titrate Levophed for SBP>90. Scheduled Midodrine, Cardiology following, recc pacemaker for bradycardia/complete heart block, however not consistent with pts goals of care. GI: SUP, Zofran PRN for N/V, Diet-Regular  Renal: Trend Renal indices, nephro following. Pt declines HD treatment. Hem/Onc: Monitor for s/o active bleeding. I/D: Sepsis bundle per hospital protocol. Blood and Urine cultures drawn and will be followed. UCx negative, Resp viral panel (-), BC (+) x 1 GPC Lactic acid normalized. Procal low.  Likely contaminant, BC redrawn; NGTD. Antibiotics:cefepime. Trend WBCs and temperature curve. Endocrine: Q6 glucoses, SSI. TSH normal   Metabolic:  Daily BMP, mag, phos. Trend lytes, replace as needed. Musc/Skin: no acute issues  DNR/DNI, signed POST w limited interventions   Plan to wean vasopressors this weekend and if pt cannot come off ggts, will be proceeding to comfort care on Monday  Discussed in interdisciplinary rounds         Best practice :    Glycemic control  IHI ICU bundles:   Central Line Bundle Followed     Stress ulcer prophylaxis. Pepcid  DVT prophylaxis. SQH  Need for Lines, chua assessed. Palliative care evaluation. Restraints need. Renea Benson, NP   Pulmonary, [address] [facility]         Attending Note:  I saw and evaluated the patient, performing all elements of service personally. I discussed the findings, assessment and plan with the NP and agree with the NP's findings and plan as documented in the NP's note above. All edits and changes made above or are mentioned in my attending note which was independently assessed as well as written. Total of 30 min critical care time spent at bedside (personally) during the course of care providing evaluation,management and care decisions and ordering appropriate treatment related to critical care problems exclusive of procedures. I performed the substantive portion of this split shared encounter (greater than 50% time)  The reason for providing this level of medical care for this critically ill patient was due a critical illness that impaired one or more vital organ systems such that there was a high probability of imminent or life threatening deterioration in the patients condition.  This care involved high complexity decision making to assess, manipulate, and support vital system functions, to treat this degree vital organ system failure and to prevent further life threatening deterioration of the patients condition. This time was independent of other practitioners. 75-year-old female with a past medical history of chronic hypotension, right hydronephrosis with stent, depression, A. fib, diabetes with neuropathy, ESRD on HD, presented to [facility] with weakness, pain, shortness of breath last week. Patient had multiple episodes of diarrhea and poor p.o. intake along with nausea and vomiting prior. Patient developed shock, secondary to cardiogenic shock in the setting of complete heart block, patient required Levophed and dopamine, patient developed symptomatic bradycardia due to digoxin toxicity. Patient also had worsening acute encephalopathy, combination of drug-induced delirium as well as toxic/metabolic encephalopathy. Patient was refusing dialysis with ESRD, palliative care consulted, patient made comfort care today by family since patient was completely encephalopathic and agitated. Care of Levophed and dopamine withdrawn, patient not receiving dialysis anymore. Patient given benzodiazepines and antipsychotics as well as opiates transferred to [facility] service for comfort care and inpatient hospice and potential home with hospice. Discussed potential treatments to use on the floor with senior resident.       Jose Balbuena MD/MPH     Pulmonary, Critical Care Medicine  [facility]

## 2022-11-21 NOTE — ACP (ADVANCE CARE PLANNING)
Advance Care Planning     General Advance Care Planning (ACP) Conversation  Advanced Steps [facility] (Physician Orders for Scope of Treatment)       Date of conversation: 11/21/22   Location:[facility]  Length (minutes): 25    Participants:   [x] Patient    [x] Healthcare agent (already designated in existing ACP document)    Name: Brett Johnson    Relationship to Patient: son    Phone number: [phone]  [x] Other Surrogate Decision Maker / Next of Isa Stanford    Name: Xavier Pitt    Relationship to Patient: daughter in law    Phone Number: [phone]    Advanced Steps® ACP Facilitator: Tanvi Monge RN    Conversation Topics   (If Patient does not have decision making capacity, Agent/Surrogate responds based on understanding of how patient would respond if capable)  Visited patient along with Palliative team members JORGE WEISS and Dr. Geoff Villa. Patient lying in bed, awake, alert. Son and daughter in law at bedside. Patient not as talkative as our previous visits. Is happy that her family is here with her. Goals of care discussed with patient and her family. They are aware that IV medications will be weaned and discontinued over an hour and she will transition to comfort measures, and hospice will be consulted for GIP care. All are in agreement at this time. Introduced the completion of Comfort Physician Order For Scope of Treatment, form reviewed and signed by mark Andrewsmerced Naveed. Palliative team provided support to patient and her family.   Cardiopulmonary Resuscitation      Order Elected for CPR:  []  Attempt Resuscitation [x]  Do Not Attempt Resuscitation    When NOT in Cardiopulmonary Arrest, Order Elected:      [x] Comfort Measures  [] Limited Additional Interventions  [] Full Interventions    Artificially Administered Nutrition, Order Elected:    [x] No Feeding Tube   [] Feeding Tube for a defined trial period  [] Feeding Tube long-term if indicated    The following was provided (check all that apply):      Healthcare Decision Information Cards:   [] Help with Breathing Facts   [] Tube Feeding Facts   [] CPR Facts      [] Review of existing Advance Directive  [] Assistance with Completion of New Advance Directive   [x] Review of [place] POST Form       Meeting Outcomes:   [x] ACP discussion completed   [x] [facility] form completed  [x] [place] prepared for Provider review and signature   [x] Original placed on Chart, if in facility (form to be sent with patient at discharge)  [x] Copy given to healthcare agent    [x] Copy placed in chart to be scanned to electronic medical record       Follow-up plan:     [x] Schedule follow-up comfort visits   [] Referred individual to Provider for additional questions/concerns   [] Advised patient/agent/surrogate to review completed POST form and update if needed with changes in condition, patient preferences or care setting     CODE STATUS: DNR/DNI, COMFORT MEASURES, NO FEEDING TUBE    Elder Kyle RN  Palliative Medicine Inpatient RN  Palliative COPE Line: [phone]

## 2022-11-21 NOTE — PROGRESS NOTES
Reason for Renal Dosing:  Per Renal Dosing Policy    Patient clinical status and labs ordered/reviewed. Pt Weight Weight: 85 kg (187 lb 6.3 oz)   Serum Creatinine Lab Results   Component Value Date/Time    Creatinine 12.50 (H) 11/21/2022 06:25 AM    Creatinine, POC 11.4 (H) 08/18/2022 07:53 AM    Creatinine (POC) 10.77 (H) 11/21/2022 09:15 AM       Creatinine Clearance Estimated Creatinine Clearance: 3.7 mL/min (A) (based on SCr of 12.5 mg/dL (H)). BUN Lab Results   Component Value Date/Time    BUN 52 (H) 11/21/2022 06:25 AM    BUN, POC 19 (H) 09/24/2020 08:45 AM       WBC Lab Results   Component Value Date/Time    WBC 8.8 11/21/2022 06:25 AM      Temperature Temp: 97 °F (36.1 °C)   HR Pulse (Heart Rate): 70     BP BP: (!) 187/171           Drug type: Antibiotic indicated for Bloodstream infection      Drug/dose: for naïve patient was initiated at: 400 mg q24h per CrCl < 30 mL    Continue to monitor.     Signed Carrillo Godinez  Date 11/21/2022  Time 11:10 AM

## 2022-11-22 NOTE — ROUTINE PROCESS
Bedside shift change report given to Fabienne Greene RN (oncoming nurse) by Ranelle Felty, RN (offgoing nurse). Report included the following information SBAR, Kardex, Intake/Output, and Recent Results.

## 2022-11-22 NOTE — PROGRESS NOTES
[facility]   POST-ROUNDING NOTE    Assessment & Plan:    - Transitioned morphine 2mg IV q2hrs prn to dilaudid 0.2mg q2hrs prn   - Administered haldol 2mg x 1 dose   - Will attempt to reach out to patient's son Mary Greco to see if they have talked to hospice yet    The above patient and plan were discussed with my supervising physician. See daily progress note for full assessment/plan.       Aylin Mckinney MD, PGY-1  [facility]  11/22/2022, 11:13 AM

## 2022-11-22 NOTE — PROGRESS NOTES
[facility]   Progress NOTE    Attempted to reach out to case management to phone number [phone] regarding transportation for patient. Reason being, hospice will only accept patient if can get home by 7pm tonight. The above patient and plan were discussed with my supervising physician. See daily progress note for full assessment/plan.       John Mcintosh MD, PGY-1  [facility]  11/22/2022, 1:20 PM

## 2022-11-22 NOTE — PROGRESS NOTES
Discharge planning    Received call from [address] with [facility]. Per [address], DME will be delivered to patient's home by 4 pm.    Discharge order noted for today. Pt has been accepted to [facility]. Spoke with [employer] via phone and agreeable to the transition plan today. Transport has been arranged through [employer]. Amalia Garcia Updated bedside RN, Tejal Hermosillo,  to the transition plan. Discharge information has been documented on the AVS.     Transportation to home. Address is [address] and phone number is [phone]  Patient will require BLS transport. Pt requires Stretcher If stretcher, reason: hospice, Hx Afib, ESRD, DM2, debility and functional weakness  Patient is currently requiring oxygen No   Height: 5'2\"   Weight: 187lb  Pt is on isolation: Yes Isolation is for: contact isolation  Is the pt ready now? no  Requested time:  4pm  PCS Faxed: yes  Insurance verified on face sheet: yes  Auth needed for transport: N/A  CM completed PCS/ Envelope and placed on chart. Spoke with Ysabel with [facility].      CHARITY QuinonesN, RN  Pager # [phone]  Care Manager

## 2022-11-22 NOTE — PROGRESS NOTES
conducted a Follow up consultation and Spiritual Assessment for [place] , who is a 76 y.o.,female. The  provided the following Interventions:  Continued the relationship of care and support. Offered prayer and assurance of continued prayer on patients behalf. Chart reviewed. Plan:  Chaplains will continue to follow and will provide pastoral care on an as needed/requested basis.  recommends bedside caregivers page  on duty if patient shows signs of acute spiritual or emotional distress.      [address] [facility] Certified [address] [place] [address]   [phone]

## 2022-11-22 NOTE — DISCHARGE SUMMARY
Kleber Mccain SUMMARY      Name:   Colletta Brazen 76 y.o. female  MRN:   [id number]  CSN:   [id number]  Admission Date:  11/14/2022  Discharge Date:  11/22/22  Attending:              Lidya Lewis MD   PCP:              Kathy Khalil MD   ================================================================  Reason for Admission:  Hypotension [I95.9]  Bradycardia [R00.1]    Discharge Diagnosis:    Chronic hypotension  ESRD no longer on dialysis  Hx Afib with symptomatic bradycardia  B2U complicated by diabetic neuropathy and gastroparesis  Debility and functional weakness  Hospital delirium with anxiety and depression    Follow-up studies/evaluations for PCP/Important Notes to PCP:  Ensure adherence to midodrine   Pending labs/investigations to follow up as below  Medication reconciliation:  Discontinued Medications: aspirin, clopidogrel 75mg, gabapentin 100mg, melatonin 5mg, dilaudid 2mg tab, allopurinol 100mg, amiodarone 200mg, sevelamer carbonate 800mg, acetaminophen 500mg, lidocaine patch, meclizine 25mg, duloxetine 20mg, estradiol 0.01% vaginal cream, biotin 1000mcg chew, cyanocobalamin 1000mcg, latanoprost 0.005% ophthalmic solution, levothyroxine 125mcg, omeprazol 20mg, ondansetron 4mg, nephro HOWIE rx 1- mg mg mcg  New Medications: midodrine 5mg bid, lorazepam 2mg/mL concentrated solution, morphine 20mg/mL concentrated solution, hyoscyamine 0.125 q6hrs, dulcolax 10mg suppository, and acetaminophen 650mg suppository    [facility] Follow Up Appointment:   Follow-up Information    None         Recommended follow-up after RAD[facility] visit: None, no HFU as patient is comfort care and hospice     Readmission prevention plan:   None    GOALS OF CARE (including Code Status, Advanced Care Plan):   Comfort care on home hospice    Pending labs/ investigations at discharge to follow up:   None        Condition at discharge: Afebrile  Ambulating  Eating, Drinking, Voiding  Stable    Disposition at Discharge:  Home    Functional Status at Discharge: Ambulates with cane, rolling walker    Diet: Regular diet    Discharge Medications:    Current Discharge Medication List        START taking these medications    Details   morphine (ROXANOL) 20 mg/mL concentrated solution Take 0.25-1 mL by mouth every three (3) hours as needed for Pain (Pain or Air hunger) for up to 4 days. Max Daily Amount: 160 mg.  Qty: 30 mL, Refills: 0  Start date: 11/22/2022, End date: 11/26/2022    Associated Diagnoses: ESRD (end stage renal disease) on dialysis [facility]); Shock ([facility] [address] Hypotension, unspecified hypotension type; Acute encephalopathy; End-stage renal disease on hemodialysis ([facility])      LORazepam (LORazepam IntensoL) 2 mg/mL concentrated solution Take 0.5-1 mL by mouth every six (6) hours as needed for Anxiety or Agitation. Max Daily Amount: 8 mg. Qty: 30 mL, Refills: 0  Start date: 11/22/2022    Associated Diagnoses: ESRD (end stage renal disease) on dialysis [facility]); Shock ([facility] [address] Hypotension, unspecified hypotension type; Acute encephalopathy; End-stage renal disease on hemodialysis (Formerly [facility])      hyoscyamine SL (LEVSIN/SL) 0.125 mg SL tablet 1 Tablet by SubLINGual route every six (6) hours as needed for Secretions or PRN Reason (Other) (Terminal congestion). Qty: 10 Tablet, Refills: 0  Start date: 11/22/2022      bisacodyL (DULCOLAX) 10 mg supp Insert 10 mg into rectum daily as needed for Constipation. Qty: 5 Suppository, Refills: 0  Start date: 11/22/2022      acetaminophen (TYLENOL) 650 mg suppository Insert 1 Suppository into rectum every four (4) hours as needed for Fever or Pain for up to 4 doses. Qty: 4 Suppository, Refills: 0  Start date: 11/22/2022      midodrine (PROAMATINE) 5 mg tablet Take 1 Tablet by mouth two (2) times daily (with meals) for 30 days.   Qty: 60 Tablet, Refills: 1  Start date: 11/22/2022, End date: 12/22/2022           STOP taking these medications       metoprolol tartrate (LOPRESSOR) 25 mg tablet Comments:   Reason for Stopping:         digoxin (LANOXIN) 0.125 mg tablet Comments:   Reason for Stopping:         aspirin delayed-release 81 mg tablet Comments:   Reason for Stopping:         clopidogreL (Plavix) 75 mg tab Comments:   Reason for Stopping:         gabapentin (NEURONTIN) 100 mg capsule Comments:   Reason for Stopping:         melatonin 5 mg tablet Comments:   Reason for Stopping:         HYDROmorphone (DILAUDID) 2 mg tablet Comments:   Reason for Stopping:         allopurinoL (ZYLOPRIM) 100 mg tablet Comments:   Reason for Stopping:         amiodarone (CORDARONE) 200 mg tablet Comments:   Reason for Stopping:         sevelamer carbonate (RENVELA) 800 mg tab tab Comments:   Reason for Stopping:         acetaminophen (Tylenol Extra Strength) 500 mg tablet Comments:   Reason for Stopping:         meclizine (ANTIVERT) 25 mg tablet Comments:   Reason for Stopping:         DULoxetine (CYMBALTA) 20 mg capsule Comments:   Reason for Stopping:         estradioL (Estrace) 0.01 % (0.1 mg/gram) vaginal cream Comments:   Reason for Stopping:         biotin 1,000 mcg chew Comments:   Reason for Stopping:         cyanocobalamin 1,000 mcg tablet Comments:   Reason for Stopping:         lactobacillus sp. 50 billion cpu (BIO-K PLUS) 50 billion cell -375 mg cap capsule Comments:   Reason for Stopping:         ascorbic acid, vitamin C, (VITAMIN C) 500 mg tablet Comments:   Reason for Stopping:         cholecalciferol (VITAMIN D3) (2,000 UNITS /50 MCG) cap capsule Comments:   Reason for Stopping:         latanoprost (XALATAN) 0.005 % ophthalmic solution Comments:   Reason for Stopping:         levothyroxine (SYNTHROID) 125 mcg tablet Comments:   Reason for Stopping:         omeprazole (PRILOSEC) 20 mg capsule Comments:   Reason for Stopping:         ondansetron (ZOFRAN ODT) 4 mg disintegrating tablet Comments:   Reason for Stopping:         vit B Cmplx 3-FA-Vit C-Biotin (NEPHRO HOWIE RX) 1- mg-mg-mcg tablet Comments:   Reason for Stopping:                Hospital Course:     Orquidea Grayson is a 76 y.o.  female with PMHx of chronic hypotension, ESRD on HD, R hydronephrosis w/ stent, a-fib, T2DM w/ neuropathy (controlled), hypothyroidism, and macrocytic anemia who was an ICU popout for management of chronic hypotension and symptomatic bradycardia now on comfort care measures with home hospice. Patient's problem list was managed in hospital as stated below:     Chronic Hypotension  - has history of chronic hypotension that was difficult to control. Previously required vasopressors (dopamine and levophed) in the ICU due to persistent hypotension.   - Patient decided to be on comfort care measures with home hospice and is now on midodrine 5 bid    Hx A-Fib, Now with Symptomatic Bradycardia  - Presence of implanted watchman device, on plavix and aspirin (watchman in place as of august 2022)  - TSH wnl. Digoxin level 1.6. Goal to \"wash\" all of digoxin out of system  - Pt and son declined pacemaker  - Outpatient metoprolol and digoxin have been discontinued per cardio     Urosepsis, Resolved  - UA was initially highly suggestive of UTI (+for bacteria, leuk est, nitrites, WBC); urine culture shows no growth   - Blood cultures collected 11/14:  1 of 2 samples grew gram+ cocci in clusters. Likely contaminated sample. - CXR not significant for acute cardiopulm process  - CT chest/abd/pelvis negative for acute pathology  - LA normalized, 2.04 > 1.98. No longer trending.  - Dysuria resolved     ESRD on HD, Hx hydronephrosis with chronic macrocytic anemia  - Previously had hemodialysis on MWF, but was not tolerating dialysis anymore due to episodes of hypotension. She was previously on [employer], Sat. She was having daily BMP, magnesium, and phosphate levels. She was also on folate and thiamine for chronic macrocytic anemia  - Renal fxn continued to worsen. Since then, patient has elected to stop dialysis altogether.  She is comfort care with limited interventions. A4SX complicated by diabetic neuropathy and gastroparesis  - Stable; last A1c 4.9. A1c < 3.8 on 11/15  - Patient had 1 episode of hypoglycemia with BG <20 with improvement to 75 while in ICU. Patient is not on any medications for T2D  - Patient was previously on cymbalta for neuropathy but was discontinued as she is now comfort care  - In hospital, she was placed on comfort care with morphine 2mg IV and ativan 1mg IV q15 min for generalized pain. Then she was transitioned to dilaudid, as patient was no longer on hemodialysis  - Patient was discharged on morphine and lorazepam for pain    Debility and Functional Weakness  - Pt w/ chronic diseases, quite debilitating and distressing to pt, including autonomic neuropathy, syncopal episodes, ESRD, chronic hypotension, and chronic nausea and vomiting. She felt weak and tired  - Pt has spoken with her son and agree on hospice care with plan to discharge home    Hospital Delirium vs. Anxiety & Depression  - Experiencing some situational depression and dysphoria d/t her declining health  - Periodic states of hallucination and/or confusion     Pertinent Results:      CURRENT ADMISSION IMAGING RESULTS   CTA CHEST ABD PELV W WO CONT    Result Date: 11/15/2022  1. No aortic aneurysm or dissection. Moderately advanced atherosclerotic disease. 2. No acute abnormalities in the chest, abdomen or pelvis. XR CHEST PORT    Result Date: 11/19/2022  No pneumothorax. Mild vascular congestion. Left IJ catheter tip in the SVC. XR CHEST PORT    Result Date: 11/14/2022  No acute findings or interval change.         Cardiology Procedures/Testing:  MODALITY RESULTS   EKG Results for orders placed or performed during the hospital encounter of 11/14/22   EKG, 12 LEAD, INITIAL   Result Value Ref Range    Ventricular Rate 50 BPM    Atrial Rate 64 BPM    QRS Duration 94 ms    Q-T Interval 418 ms    QTC Calculation (Bezet) 381 ms    Calculated R Axis -27 degrees    Calculated T Axis -135 degrees    Diagnosis       Probable Junctional rhythm  ST & T wave abnormality, consider inferolateral ischemia  Abnormal ECG  When compared with ECG of 08-NOV-2022 13:37,  Junctional rhythm has replaced Atrial fibrillation  Minimal criteria for Anterior infarct are no longer present  Inverted T waves have replaced nonspecific T wave abnormality in Lateral   leads  QT has shortened  Confirmed by Preston Gay MD, --- ([id number]) on 11/14/2022 4:50:01 PM         ECHO 08/15/22    ECHO CATINA W OR WO CONTRAST 08/15/2022 8/15/2022    Interpretation Summary    Left Ventricle: Normal left ventricular systolic function with a visually estimated EF of 55 - 60%. Left Atrium: No left atrial appendage thrombus noted. Watchman well-seated with no residual jet around the device. Signed by: Patrick Timmons MD on 8/15/2022 12:22 PM     IR Results from [place] encounter on 10/14/20    IR NEPHROSTOMY PERC RT [facility] CATH  SI    Impression  IMPRESSION:    Technically successful nephrostogram.    Technically successful fluoroscopy assisted exchange for 8 [place] double-J  catheter, right side. Moderate sedation provided for the procedure, 30 minutes      Results from [place] encounter on 07/15/20    IR EXCHANGE NEPHRO PERC RT SI    Impression  IMPRESSION:    Successful, uncomplicated right diagnostic antegrade nephrostogram with  antegrade nephroureteral stent exchange. Proximal and distal renal collecting  systems completely decompressed. Free flow contrast media in the urinary  bladder. No debris. No hydronephrosis or hydroureter. Plan: Patient should come back interventional radiology department at  approximately 8 weeks for routine exchange of the right nephroureteral stent.       Results from [place] encounter on 04/13/20    IR NEPHROURETERAL PERC RT [facility] CATH NEW ACCESS  SI    Impression  IMPRESSION:    Successful uncomplicated right nephrostomy tube removal, high-grade distal right  ureteral stricture recanalization, right ureteral high-grade stricture balloon  ureteroplasty as well as placement of the new right nephroureteral catheter. Plan: Patient will come back to interventional radiology in approximately 4  weeks for detailed antegrade nephrostogram and possible nephroureteral catheter  removal. If stricture persists however internalization with double-J stent will  be considered. CATH 08/18/22    INVASIVE VASCULAR PROCEDURE 08/18/2022 8/18/2022    Narrative  See op note    Signed by: Gm Santos MD on 8/18/2022  9:53 AM        Special Testing/Procedures:  MODALITY RESULTS   MICRO All Micro Results       Procedure Component Value Units Date/Time    CULTURE, BLOOD [[id number]] Collected: 11/17/22 1004    Order Status: Completed Specimen: Blood Updated: 11/22/22 0653     Special Requests: NO SPECIAL REQUESTS        Culture result: NO GROWTH 5 DAYS       CULTURE, BLOOD [[id number]] Collected: 11/17/22 1004    Order Status: Completed Specimen: Blood Updated: 11/22/22 0653     Special Requests: NO SPECIAL REQUESTS        Culture result: NO GROWTH 5 DAYS       CULTURE, BLOOD [[id number]] Collected: 11/14/22 2026    Order Status: Completed Specimen: Blood Updated: 11/20/22 0703     Special Requests: NO SPECIAL REQUESTS        Culture result: NO GROWTH 6 DAYS       CULTURE, BLOOD [[id number]]  (Abnormal) Collected: 11/14/22 2011    Order Status: Completed Specimen: Blood Updated: 11/17/22 0724     Special Requests: NO SPECIAL REQUESTS        GRAM STAIN       AEROBIC BOTTLE GRAM POSITIVE COCCI IN GROUPS                  SMEAR CALLED TO AND CORRECTLY REPEATED BY: JOSE ANTONIO AVENDANO [facility] ICU ON 15NOV22 AT [address] [facility] AG 1980.            Culture result:       STAPHYLOCOCCUS SPECIES, COAGULASE NEGATIVE MULTIPLE COLONY TYPES GROWING IN THIS SINGLE BOTTLE DRAWN SITE = R EJ          CULTURE, URINE [[id number]] Collected: 11/15/22 1800    Order Status: Completed Specimen: Cath Urine Updated: 11/17/22 0705     Special Requests: NO SPECIAL REQUESTS        Culture result: No growth (<1,000 CFU/ML)       BLOOD CULTURE ID PANEL [[id number]]  (Abnormal) Collected: 11/14/22 2011    Order Status: Completed Specimen: Blood Updated: 11/16/22 1012     Acc. no. from Micro Order [id number]     Enterococcus faecalis Not detected        Enterococcus faecium Not detected        Listeria monocytogenes Not detected        Staphylococcus Detected        Staphylococcus aureus Not detected        Staph epidermidis Detected        Staph lugdunensis Not detected        Streptococcus Not detected        Streptococcus agalactiae (Group B) Not detected        Streptococcus pneumoniae Not detected        Streptococcus pyogenes (Group A) Not detected        Acinetobacter calcoaceticus-baumanii complex Not detected        Bacteroides fragilis Not detected        Enterobacterales species Not detected        Enterobacter cloacae complex Not detected        Escherichia coli Not detected        Klebsiella aerogenes Not detected        Klebsiella oxytoca Not detected        Klebsiella pneumoniae group Not detected        Proteus Not detected        Salmonella Not detected        Serratia marcescens Not detected        Haemophilus influenzae Not detected        Neisseria meningitidis Not detected        Pseudomonas aeruginosa Not detected        Steno maltophilia Not detected        Candida albicans Not detected        Candida auris Not detected        Candida glabrata Not detected        Candida krusei Not detected        Candida parapsilosis Not detected        Candida tropicalis Not detected        Crypto neoformans/gattii Not detected        RESISTANT GENES:            MECA/C (Methicillin resistant gene) Detected        Comment       False positive results may rarely occur.  Correlate with clinical,epidemiologic, and other laboratory findings           Comment: Please see BCID Interpretation Guide in [employer] RESPIRATORY VIRUS PANEL W/COVID-19, PCR [[id number]] Collected: 11/14/22 2312    Order Status: Completed Specimen: Nasopharyngeal Updated: 11/15/22 0031     Adenovirus Not detected        Coronavirus 229E Not detected        Coronavirus HKU1 Not detected        Coronavirus CVNL63 Not detected        Coronavirus OC43 Not detected        SARS-CoV-2, PCR Not detected        Metapneumovirus Not detected        Rhinovirus and Enterovirus Not detected        Influenza A Not detected        Influenza A, subtype H1 Not detected        Influenza A, subtype H3 Not detected        INFLUENZA A H1N1 PCR Not detected        Influenza B Not detected        Parainfluenza 1 Not detected        Parainfluenza 2 Not detected        Parainfluenza 3 Not detected        Parainfluenza virus 4 Not detected        RSV by PCR Not detected        B. parapertussis, PCR Not detected        Bordetella pertussis - PCR Not detected        Chlamydophila pneumoniae DNA, QL, PCR Not detected        Mycoplasma pneumoniae DNA, QL, PCR Not detected              ABG Lab Results   Component Value Date/Time    HCO3 (POC) 15.3 (L) 11/21/2022 09:15 AM      UA Results for orders placed or performed in visit on 11/01/22   AMB POC URINALYSIS DIP STICK AUTO W/O MICRO     Status: Abnormal   Result Value Ref Range Status    Color (UA POC) Yellow  Final    Clarity (UA POC) Clear  Final    Glucose (UA POC) Trace Negative Final    Bilirubin (UA POC) 1+ Negative Final    Ketones (UA POC) Negative Negative Final    Specific gravity (UA POC) 1.020 1.001 - 1.035 Final    Blood (UA POC) 3+ Negative Final    pH (UA POC) 8.5 (A) 4.6 - 8.0 Final    Protein (UA POC) 3+ Negative Final    Urobilinogen (UA POC) 0.2 mg/dL 0.2 - 1 Final    Nitrites (UA POC) Negative Negative Final    Leukocyte esterase (UA POC) 1+ Negative Final         Laboratory Results:  LABORATORY RESULTS   HEMATOLOGY Lab Results   Component Value Date/Time    WBC 8.8 11/21/2022 06:25 AM    Hemoglobin, POC 8.8 (L) 09/24/2020 08:45 AM    HGB 7.4 (L) 11/21/2022 06:25 AM    Hematocrit, POC 26 (L) 09/24/2020 08:45 AM    HCT 22.2 (L) 11/21/2022 06:25 AM    PLATELET 466 89/40/4280 06:25 AM    .9 (H) 11/21/2022 06:25 AM       CHEMISTRIES Lab Results   Component Value Date/Time    Sodium 127 (L) 11/21/2022 06:25 AM    Potassium 6.1 (HH) 11/21/2022 06:25 AM    Chloride 96 (L) 11/21/2022 06:25 AM    CO2 19 (L) 11/21/2022 06:25 AM    Anion gap 12 11/21/2022 06:25 AM    Glucose 94 11/21/2022 06:25 AM    BUN 52 (H) 11/21/2022 06:25 AM    Creatinine 12.50 (H) 11/21/2022 06:25 AM    BUN/Creatinine ratio 4 (L) 11/21/2022 06:25 AM    GFR est AA 4 (L) 08/18/2022 12:00 PM    GFR est non-AA 4 (L) 08/18/2022 12:00 PM    Calcium 8.5 11/21/2022 06:25 AM      HEPATIC FUNCTION Lab Results   Component Value Date/Time    Albumin 3.3 (L) 11/14/2022 01:30 PM    Bilirubin, total 0.6 11/14/2022 01:30 PM    Protein, total 6.8 11/14/2022 01:30 PM    Globulin 3.5 11/14/2022 01:30 PM    A-G Ratio 0.9 11/14/2022 01:30 PM    ALT (SGPT) 15 11/14/2022 01:30 PM    Alk. phosphatase 107 11/14/2022 01:30 PM       LACTIC ACID Lab Results   Component Value Date/Time    Lactic acid 1.1 05/15/2020 08:29 AM      CARDIAC PANEL Lab Results   Component Value Date/Time    Troponin-I, QT <0.02 11/12/2021 03:20 PM      NT-proBNP Lab Results   Component Value Date/Time    NT pro-BNP 3,393 (H) 11/15/2022 04:00 AM    NT pro-BNP 8,779 (H) 10/19/2022 01:41 PM    NT pro-BNP 5,643 (H) 10/08/2021 03:50 PM      THYROID Lab Results   Component Value Date/Time    TSH 3.14 11/14/2022 01:30 PM    T4, Free 1.5 05/25/2022 09:12 AM            Readmission Risk Score: High Risk            31 Total Score    3 Patient Length of Stay (>5 days = 3)    4 IP Visits Last 12 Months (1-3=4, 4=9, >4=11)    5 Pt.  Coverage (Medicare=5 , Medicaid, or Self-Pay=4)    19 Charlson Comorbidity Score (Age + Comorbid Conditions)        Criteria that do not apply:    Has Seen PCP in Last 6 Months (Yes=3, No=0)    . Living with Significant Other. Assisted Living. LTAC. SNF.  or   Rehab            Aisha Griffith MD, PGY-1  [facility]  11/22/2022 12:19 PM

## 2022-11-22 NOTE — PROGRESS NOTES
1900:  Patient seen this evening in ICU. Family at bedside. Patient received Morphine 2mg earlier this evening. Much more comfortable than prior assessment, sleeping in bed. Discussed with Son at bedside goals of care, pain, anxiety, agitation management plan. Plan for transfer out of ICU as space allows. Son states he spoke with hospice today about obtaining hospital bed and equipment but nothing set at this time, was planning on scheduling a delivery tomorrow morning, hopeful to have equipment in the next 1-2 days. Patient's wishes are to pass away at home. Will continue to monitor patient throughout the night and maintain goals for comfort care only. 2115:  Patient seen again in ICU. Heart rate and pulse ox appropriate. Comfortable, asleep in bed. No family at bedside. Received Morphine 2mg and Ativan 1mg at 2000. Continue to follow. Can switch Morphine to dilaudid due to ESRD in future if necessary. 0045:  Patient seen after transfer to 4N. Patient awake and alert lying in bed. No complaints. Denies pain. States has been able to sleep today. Received Morphine 2mg and Ativan 1mg at midnight. Will continue to follow closely tonight. 0500:  Patient seen this AM.  Resting comfortably in bed, asleep. Did not wake her up. No recent medications per MAR.         Oresets Logan DO, PGY-3   [facility]   November 21, 2022, 5:20 AM

## 2022-11-22 NOTE — ROUTINE PROCESS
Wound Prevention Checklist    Patient: Kip Barnes (73 y.o. female)  Date: 11/22/2022  Diagnosis: Hypotension [I95.9]  Bradycardia [R00.1] Hypotension    Precautions:         []  Heel prevention boots placed on patient    []  Patient turned q2h during shift    []  Lift team ordered    [x]  Patient on Adam bed/Specialty bed    []  Each Wound is documented during shift (Stage, Color, drainage, odor, measurements, and dressings)    [x]  Dual skin check done with JOSE ANTONIO Quiroz RN

## 2022-11-22 NOTE — DISCHARGE INSTRUCTIONS
DISCHARGE SUMMARY from Nurse    PATIENT INSTRUCTIONS:    After general anesthesia or intravenous sedation, for 24 hours or while taking prescription Narcotics:  Limit your activities  Do not drive and operate hazardous machinery  Do not make important personal or business decisions  Do  not drink alcoholic beverages  If you have not urinated within 8 hours after discharge, please contact your surgeon on call. Report the following to your surgeon:  Excessive pain, swelling, redness or odor of or around the surgical area  Temperature over 100.5  Nausea and vomiting lasting longer than 4 hours or if unable to take medications  Any signs of decreased circulation or nerve impairment to extremity: change in color, persistent  numbness, tingling, coldness or increase pain  Any questions    What to do at Home:  Recommended activity: Activity as tolerated,     If you experience any of the following symptoms Patient discharging on Hospice, please follow up with Hospice. *  Please give a list of your current medications to your Primary Care Provider. *  Please update this list whenever your medications are discontinued, doses are      changed, or new medications (including over-the-counter products) are added. *  Please carry medication information at all times in case of emergency situations. These are general instructions for a healthy lifestyle:    No smoking/ No tobacco products/ Avoid exposure to second hand smoke  Surgeon General's Warning:  Quitting smoking now greatly reduces serious risk to your health.     Obesity, smoking, and sedentary lifestyle greatly increases your risk for illness    A healthy diet, regular physical exercise & weight monitoring are important for maintaining a healthy lifestyle    You may be retaining fluid if you have a history of heart failure or if you experience any of the following symptoms:  Weight gain of 3 pounds or more overnight or 5 pounds in a week, increased swelling in our hands or feet or shortness of breath while lying flat in bed. Please call your doctor as soon as you notice any of these symptoms; do not wait until your next office visit. Patient armband removed and shredded   MyChart Activation    Thank you for requesting access to Honey. Please follow the instructions below to securely access and download your online medical record. Honey allows you to send messages to your doctor, view your test results, renew your prescriptions, schedule appointments, and more. How Do I Sign Up? In your internet browser, go to [email or web address]  Click on the First Time User? Click Here link in the Sign In box. You will be redirect to the New Member Sign Up page. Enter your Honey Access Code exactly as it appears below. You will not need to use this code after youve completed the sign-up process. If you do not sign up before the expiration date, you must request a new code. Honey Access Code: [email or web address]  Expires: 2022 10:09 AM (This is the date your Honey access code will )    Enter the last four digits of your Social Security Number (xxxx) and Date of Birth (mm/dd/yyyy) as indicated and click Submit. You will be taken to the next sign-up page. Create a Honey ID. This will be your Honey login ID and cannot be changed, so think of one that is secure and easy to remember. Create a Honey password. You can change your password at any time. Enter your Password Reset Question and Answer. This can be used at a later time if you forget your password. Enter your e-mail address. You will receive e-mail notification when new information is available in [address] Click Sign Up. You can now view and download portions of your medical record. Click the [employer] link to download a portable copy of your medical information.     Additional Information    If you have questions, please visit the Frequently Asked Questions section of the [employer] website at [email or web address] Remember, [employer] is NOT to be used for urgent needs. For medical emergencies, dial 911. The discharge information has been reviewed with the {PATIENT PARENT GUARDIAN:58223}. The {PATIENT PARENT GUARDIAN:39574} verbalized understanding. Discharge medications reviewed with the {[employer] meds reviewed DGOX:83225} and appropriate educational materials and side effects teaching were provided.   ___________________________________________________________________________________________________________________________________

## 2022-11-22 NOTE — PROGRESS NOTES
Interim events noted. Patient transitioned to comfort measures only. Will sign off. Follow up prn.  thanks

## 2022-11-22 NOTE — PROGRESS NOTES
*ATTENTION:  This note has been created by a medical student for educational purposes only. Please do not refer to the content of this note for clinical decision-making, billing, or other purposes. Please see attending physicians note to obtain clinical information on this patient. *       [facility]  DAILY PROGRESS NOTE    This note was created by a medical student and is for education purposes only. Please refer to the resident note for treatment plan and billing purposes. Patient:    Jesús Hinds , 76 y.o. female   Hospital Day:    MRN:  [id number]  Room/Bed:  456/01  Admission Date:   11/14/2022  Code status:  DNR    Reason for Admission: symptomatic bradycardia    ASSESSMENT AND PLAN:   Problem List Items Addressed This Visit          Circulatory    Chronic hypotension (Chronic)    * (Principal) Hypotension       Urinary    ESRD on dialysis ([address]       Other    Presence of Watchman left atrial appendage closure device    Bradycardia     Other Visit Diagnoses       Symptomatic bradycardia    -  Primary    ESRD (end stage renal disease) on dialysis ([address]        Shock ([address]        Cardiogenic shock ([address] [R57.0 (ICD-10-CM)]        Poisoning by digoxin, accidental or unintentional, sequela [T46.0X1S (ICD-10-CM)]        Acute encephalopathy [G93.40 (ICD-10-CM)]              Comfort Care Only  - Hospice consult, dispo planning  - Comfort measures in place, including adequate pain control with prn Haldol, Ativan, and morphine    2. ESRD  - No longer receiving dialysis  - Comfort care measures    3. Hypotension  - Admitting diagnosis  - Remains on midodrine 5mg bid  - Comfort care measures      Code: DNR  Diet: Regular  DVT/AC: SQH  Mobility: per protocol  Disposition: pending hospice    Point of Contact (relationship):         SUBJECTIVE:   Events of the last 24 hours:  No acute events overnight. Patient seen at beside. No acute complaints.      ROS (positive findings are in BOLD; negative findings are in regular font)  Constitutional: fevers, chills, appetite changes, weight changes, fatigue  HEENT: changes in vision, changes in hearing, sore throat, dysphagia  Cardiovascular: chest pain, palpitations, PND, orthopnea, edema  Pulmonary: SOB, cough, sputum production, wheezing, chest tightness  Gastrointestinal: abdominal pain, nausea/vomiting, diarrhea, constipation, melena, hematochezia  Genitourinary: dysuria, hesitation, dribbling, urgency, hematuria  Musculoskeletal: arthralgias, myalgias  Skin: rash, itching  Neurological: sensory changes, motor changes, headache  Psychiatric: mood changes  Endocrine: heat/cold intolerance  Heme: easy bruising/easy bleeding, LAD    CURRENT INPATIENT MEDICATIONS:  Current Facility-Administered Medications   Medication Dose Route Frequency Provider Last Rate Last Admin    midodrine (PROAMATINE) tablet 5 mg  5 mg Oral BID WITH MEALS Hank Cooper MD        hyoscyamine SL (LEVSIN/SL) tablet 0.125 mg  0.125 mg SubLINGual Q4H PRN Ramon Membreno MD        haloperidol (HALDOL) 2 mg/mL oral solution 2 mg  2 mg SubLINGual Q6H PRN Ramon Membreno MD        Or    haloperidol lactate (HALDOL) injection 2 mg  2 mg IntraVENous Q6H PRN Ramon Membreno MD        morphine injection 2 mg  2 mg IntraVENous Q15MIN PRN Ramon Membreno MD   2 mg at 11/22/22 0009    LORazepam (ATIVAN) injection 1 mg  1 mg IntraVENous Q15MIN PRN Ramon Membreno MD   1 mg at 11/22/22 0009       Allergies  Allergies   Allergen Reactions    Albumin, Human 25 % Itching     Headache - severe migraine like, itchy eyes, runny nose    Ciprofloxacin Hives    Cyclopentolate Unknown (comments)    Iron Sucrose Diarrhea    Statins-Hmg-Coa Reductase Inhibitors Other (comments)     Body ache       OBJECTIVE:    Intake/Output Summary (Last 24 hours) at 11/22/2022 0844  Last data filed at 11/21/2022 1200  Gross per 24 hour   Intake 54.51 ml   Output --   Net 54.51 ml       Visit Vitals  /65   Pulse 90   Temp 98 °F (36.7 °C)   Resp 16   Ht 5' 2\" (1.575 m)   Wt 85 kg (187 lb 6.3 oz)   SpO2 97%   BMI 34.27 kg/m²       PHYSICAL EXAM  Gen: AMS, NAD, comfortable  HEENT: normocephalic, atraumatic, MMM, no thyromegaly, no JVD  CV: RRR, S1/S2 present without M/R/G, +2 radial pulses, well-perfused, PMI not displaced  Pulm: CTAB, no wheezes, no crackles, clutching at throat, complaining of difficulty breathing  Abd: S/NT/ND, no rebound, no guarding, no hepatosplenomegaly   MSK: no clubbing, no edema  Skin: warm, dry, intact, no rash  Neuro: CN II-XII grossly intact, no focal deficits appreciated, oriented only to person  Psych: appropriate, alert, oriented x3    LABWORK (LAST 24 HOURS)  Recent Results (from the past 24 hour(s))   BLOOD GAS,CHEM8,LACTIC ACID POC    Collection Time: 11/21/22  9:15 AM   Result Value Ref Range    Calcium, ionized (POC) 1.11 (L) 1.12 - 1.32 mmol/L    Base deficit (POC) 10.4 mmol/L    HCO3 (POC) 15.3 (L) 22 - 26 MMOL/L    CO2, POC 15 (L) 19 - 24 MMOL/L    O2 SAT 73 %    Sample source VENOUS BLOOD      Performed by Aleksandra Peterson     Sodium (POC) 126 (L) 136 - 145 mmol/L    Potassium (POC) 5.0 3.5 - 5.1 mmol/L    Glucose (POC) <20 (LL) 65 - 100 mg/dL    Creatinine (POC) 10.77 (H) 0.6 - 1.3 mg/dL    Lactic Acid (POC) 2.97 (HH) 0.40 - 2.00 mmol/L    Chloride (POC) 99 98 - 107 mmol/L    Anion gap, POC 13 10 - 20      pH, venous (POC) 7.28 (L) 7.32 - 7.42      pCO2, venous (POC) 32.4 (L) 41 - 51 MMHG    pO2, venous (POC) 43 (H) 25 - 40 mmHg    Critical value read back 7    GLUCOSE, POC    Collection Time: 11/21/22  9:25 AM   Result Value Ref Range    Glucose (POC) 75 70 - 110 mg/dL       IMAGING AND PROCEDURES (LAST 36 HOURS)  No results found.    ================================================================  Further management for Ms. Monique Sparks will be discussed on rounds with my attending.       [address] Student  [facility]  November 22, 2022 8:44 AM

## 2022-11-22 NOTE — PROGRESS NOTES
TRANSFER - OUT REPORT:    Verbal report given to JOSE ANTONIO Tran(name) on Randee Stern  being transferred to (unit) for routine progression of care       Report consisted of patients Situation, Background, Assessment and   Recommendations(SBAR). Information from the following report(s) SBAR, Kardex, Intake/Output, MAR, and Recent Results was reviewed with the receiving nurse. Lines:   Venous Access Device AV Fistula (Active)       Peripheral IV 11/14/22 Anterior;Right External jugular (Active)   Site Assessment Clean, dry, & intact 11/21/22 1200   Phlebitis Assessment 0 11/21/22 1200   Infiltration Assessment 0 11/21/22 1200   Dressing Status Clean, dry, & intact 11/21/22 1200   Dressing Type Transparent;Tape 11/21/22 1200   Hub Color/Line Status Pink;Capped 11/21/22 1200   Action Taken Open ports on tubing capped 11/21/22 1200   Alcohol Cap Used Yes 11/21/22 1200       Peripheral IV 11/19/22 Proximal;Right;Upper Cephalic (Active)   Site Assessment Clean, dry, & intact 11/21/22 1200   Phlebitis Assessment 0 11/21/22 1200   Infiltration Assessment 0 11/21/22 1200   Dressing Status Clean, dry, & intact 11/21/22 1200   Dressing Type Transparent;Tape 11/21/22 1200   Hub Color/Line Status Blue;Capped 11/21/22 1200   Action Taken Open ports on tubing capped 11/21/22 1200   Alcohol Cap Used Yes 11/21/22 1200        Opportunity for questions and clarification was provided.       Patient transported with:   Monitor  Registered Nurse

## 2022-11-22 NOTE — PROGRESS NOTES
Palliative Medicine    Rounded on comfort patient along with Palliative team member JORGE [address] MSW. Patient sitting up in bed, awake, alert with periods of increased confusion. Bedside RN present along with 2 nursing students attempting to give patient her morning medications. Patient put her hand in front of her mouth refusing meds. Bedside RN reports that patient was trying to get out of bed, yelling, \"don't leave me\". Was medicated with Haldol prior to our visit. Bedside RN stated that she will re-check patient within an hour and medicate with Ativan if needed. Palliative team remains available to provide support to Ms. Stern and her family.     CODE STATUS: DNR/DNI, COMFORT MEASURES, NO FEEDING TUBE    Gwen Smith RN  Palliative Medicine Inpatient RN  Palliative [facility] Line: [phone]

## 2022-11-22 NOTE — PROGRESS NOTES
[facility] [address]  Admission History and Physical      Patient:    Vinay Franco      76 y.o. female            MRN:       [id number]                                                                                    Admission Date:         11/14/2022  Code status:                DNR    Vinay Franco is a 76y.o. year old female admitted for Hypotension [I95.9]  Bradycardia [R00.1]. ASSESSMENT AND PLAN  Problem List Items Addressed This Visit          Circulatory    Chronic hypotension (Chronic)    * (Principal) Hypotension       Urinary    ESRD on dialysis ([facility] [place] [address]       Other    Presence of Watchman left atrial appendage closure device    Bradycardia     Other Visit Diagnoses       Symptomatic bradycardia    -  Primary    ESRD (end stage renal disease) on dialysis ([facility] [place] [address]        Shock ([facility] [place] [address]        Cardiogenic shock ([facility] [place] [address] [R57.0 (ICD-10-CM)]        Poisoning by digoxin, accidental or unintentional, sequela [T46.0X1S (ICD-10-CM)]        Acute encephalopathy [G93.40 (ICD-10-CM)]              Vinay Franco is a 76 y.o.  female with PMHx of chronic hypotension, ESRD on HD, R hydronephrosis w/ stent, a-fib, T2DM w/ neuropathy (controlled), hypothyroidism, and macrocytic anemia who is an ICU popout for management of chronic hypotension and symptomatic bradycardia now on comfort care measures     Acute on Chronic Hypotension  - has history of chronic hypotension that was difficult to control. Previously required vasopressors (dopamine and levophed) in the ICU due to persistent hypotension.   - Patient decided to be on comfort care measures and is now on midodrine 5 bid  Plan:  - Continue midodrine 5mg bid  - Encourage hydration  - Comfort care measures    Hx A-Fib, Now with Symptomatic Bradycardia  - Presence of implanted watchman device, on plavix and aspirin (watchman in place as of august 2022)  - TSH wnl. Digoxin level 1.6. Goal to \"wash\" all of digoxin out of system  - Pt and son decline pacemaker t this time.   - Outpatient metoprolol and digoxin have been discontinued per cardio   Plan:  - Holding antihypertensives/antiarrhythmics   - Holding 81 mg ASA and plavix 75 mg for watchman device, as patient is on comfort care    Urosepsis, Resolved  - UA returned and was highly suggestive of UTI (+for bacteria, leuk est, nitrites, WBC); urine culture shows no growth   - Blood cultures collected 11/14:  1 of 2 samples grew gram+ cocci in clusters. Likely contaminated sample. - CXR not significant for acute cardiopulm process  - CT chest/abd/pelvis negative for acute pathology  - LA normalized, 2.04 > 1.98. No longer trending. Plan:  - Resolved, continue to monitor for any additional symptoms    ESRD on HD, Hx hydronephrosis  - Previously had hemodialysis on MWF, but was not tolerating dialysis anymore due to episodes of hypotension. Renal fxn continued to worsen. Since then, patient has elected to stop dialysis altogether. She is comfort care with limited interventions. If she changes her mind, may need dialysis cath and to switch to CRRT. - Chronic macrocytic anemia; Hb 7.4 on 11/21  Plan:  - Discontinued dialysis MWF  - Discontinued Retacrit Tues, Thurs, Sat  - Discontinued folate and thiamine  - Discontinued Daily BMP, magnesium, and phos, for comfort care with limited measures    S5TF complicated by diabetic neuropathy and gastroparesis  - Stable; last A1c 4.9. Patient had 1 episode of hypoglycemia BG <20 with improvement to 75 while in ICU.  Not on any correctional insulin or medications for T2D  - A1c < 3.8 on 11/15  Plan:  - Discontinued Correctional insulin and POC glucose checks q6hrs  - Discontinued Cymbalta for neuropathy   - Morphine 2 mg IV and ativan 1mg IV q 15 min prn for pain  - Plan to transition morphine to dilaudid, as patient is no longer on hemodialysis    Debility and Functional Weakness  - Pt w/ chronic diseases, quite debilitating and distressing to pt, including autonomic neuropathy, syncopal episodes, ESRD, chronic hypotension, and chronic nausea and vomiting. Feels weak and tired. - Pt has spoken with her son and agree on hospice care with plan to discharge home  Plan:  - Prioritize comfort  - plan to discharge home on hospice    Hospital Delirium vs. Anxiety & Depression  - Experiencing some situational depression and dysphoria d/t her declining health  - Periodic states of hallucination and/or confusion   - Alert and oriented to self, date, location  Plan:  - Discontinued cymbalta 30 mg daily   - Haldol 2mg prn    Code: DNR  Diet: Adult regular  DVT/AC: SQH 5000 units Q8hr  Mobility: per protocol  Disposition: Critical care, ICU    Point of Contact (relationship): [employer], Son: [phone]    SUBJECTIVE:  Patient is delirious. She was trying to get out of bed stating she wanted to go home. She was oriented to place, but she did not know the year. Patient is on comfort care and plan is for hospice at home with limited interventions. ROS AS ABOVE. PMHx, PSHx, SHx, FHx, MEDS, ALLERGIES:   Past Medical History:   Diagnosis Date    Anemia in end-stage renal disease ([facility] [address]     Anticoagulated by anticoagulation treatment     On Apixaban    Chronic hypotension     On Midodrine    Chronic kidney disease     ESRD on HD MWF    Chronic pain     Closed fracture of left inferior pubic ramus, with routine healing, subsequent encounter 11/12/2021    Closed fracture of superior pubic ramus, left, with routine healing, subsequent encounter 11/12/2021    Closed nondisplaced fracture of anterior wall of left acetabulum with routine healing 11/12/2021    Depression     End-stage renal disease on hemodialysis ([facility] [address]     HD at [facility] on MWF.  Tel # [phone]    Gastroesophageal reflux disease     Glaucoma     History of acute pyelonephritis 02/20/2020    History of hydronephrosis 10/05/2021    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    Urine culture (collected 10/8/2021, resulted 10/14/2021) yielded growth of >100,000 colonies/ml of Enterococcus faecalis RESISTANT to Ciprofloxacin, Levofloxacin, Tetracycline and Vancomycin    History of kidney stones     History of recurrent urinary tract infection     History of sepsis 06/18/2021    History of septic shock 10/08/2021    History of urethral stricture     History of urinary tract infection 10/08/2021    Urine culture (collected 10/8/2021, resulted 10/14/2021) yielded growth of >100,000 colonies/ml of Enterococcus faecalis RESISTANT to Ciprofloxacin, Levofloxacin, Tetracycline and Vancomycin    Hyperlipidemia     Hyperphosphatemia 11/14/2021    Hypothyroidism     Lung mass     Mononeuropathy     Involving ring finger of left hand    Need for prophylactic isolation 10/08/2021    Urine culture (collected 10/8/2021, resulted 10/14/2021) yielded growth of >100,000 colonies/ml of Enterococcus faecalis RESISTANT to Ciprofloxacin, Levofloxacin, Tetracycline and Vancomycin    Osteoporosis     Paroxysmal atrial fibrillation (HCC)     Secondary hyperparathyroidism of renal origin ([facility] [address]     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 ([facility] [address]     HbA1c (10/8/2021) = 4.6    Uric acid nephrolithiasis     Urinary incontinence       Past Surgical History:   Procedure Laterality Date    HX APPENDECTOMY      HX CHOLECYSTECTOMY      HX GASTRIC BYPASS      Gastric stapling    HX KNEE ARTHROSCOPY      HX UROLOGICAL      right PCN placement    HX UROLOGICAL  07/23/2018    RIGHT URETEROSCOPY WITH HOLMIUM LASER    IR EXCHANGE NEPHRO PERC LT SI  2/21/2020    IR EXCHANGE NEPHRO PERC RT SI  4/13/2020    IR EXCHANGE NEPHRO PERC RT SI  7/17/2020    IR NEPHROSTOMY PERC RT PLC CATH  SI  10/14/2020    IR NEPHROURETERAL PERC RT PLC CATH NEW ACCESS  SI  4/30/2020    [facility] INTRO CATH DIALYSIS CIRCUIT DX ANGRPH FLUOR S&I Left 9/24/2020    FISTULOGRAM LEFT/poss permanent catheter placement performed by Suze Peterson MD at [facility] CATH LAB    VASCULAR SURGERY PROCEDURE UNLIST      lef AVF      Social History     Tobacco Use    Smoking status: Never    Smokeless tobacco: Never   Vaping Use    Vaping Use: Never used   Substance Use Topics    Alcohol use: Never    Drug use: Never     Family History   Problem Relation Age of Onset    Heart Surgery Sister      Allergies   Allergen Reactions    Albumin, Human 25 % Itching     Headache - severe migraine like, itchy eyes, runny nose    Ciprofloxacin Hives    Cyclopentolate Unknown (comments)    Iron Sucrose Diarrhea    Statins-Hmg-Coa Reductase Inhibitors Other (comments)     Body ache       Current Facility-Administered Medications   Medication Dose Route Frequency Provider Last Rate Last Admin    midodrine (PROAMATINE) tablet 5 mg  5 mg Oral BID WITH MEALS Hank Duran MD        hyoscyamine SL (LEVSIN/SL) tablet 0.125 mg  0.125 mg SubLINGual Q4H PRN Alicja Rankin MD        haloperidol (HALDOL) 2 mg/mL oral solution 2 mg  2 mg SubLINGual Q6H PRN Alicja Rankin MD        Or    haloperidol lactate (HALDOL) injection 2 mg  2 mg IntraVENous Q6H PRN Alicja Rankin MD        morphine injection 2 mg  2 mg IntraVENous Q15MIN PRN Alicja Rankin MD   2 mg at 11/22/22 0009    LORazepam (ATIVAN) injection 1 mg  1 mg IntraVENous Q15MIN PRN Alicja Rankin MD   1 mg at 11/22/22 0009             OBJECTIVE:  Patient Vitals for the past 24 hrs:   BP Temp Pulse Resp SpO2 Height   11/22/22 0353 (!) 90/52 98.1 °F (36.7 °C) 98 12 -- --   11/22/22 0110 (!) 146/65 98.2 °F (36.8 °C) (!) 103 12 97 % --   11/21/22 2300 -- -- 74 12 99 % --   11/21/22 2200 -- -- 70 12 100 % --   11/21/22 2100 -- -- 74 13 100 % --   11/21/22 2000 -- 97.3 °F (36.3 °C) 85 19 100 % --   11/21/22 1215 (!) 73/46 -- 62 17 97 % --   11/21/22 1200 (!) 87/37 -- (!) 58 18 91 % --   11/21/22 1145 (!) 79/38 -- (!) 59 15 96 % --   11/21/22 1130 102/80 -- (!) 57 15 100 % --   11/21/22 1115 (!) 81/44 -- (!) 59 17 100 % --   11/21/22 1100 (!) 138/126 -- 66 15 100 % --   11/21/22 1051 -- -- -- -- -- 5' 2\" (1.575 m)   11/21/22 1045 (!) 187/171 -- 70 14 100 % --   11/21/22 1030 (!) 175/151 -- 68 21 98 % --   11/21/22 1015 (!) 79/43 -- 71 13 94 % --   11/21/22 1000 (!) 74/41 -- 64 16 95 % --   11/21/22 0945 (!) 81/45 -- 67 19 96 % --   11/21/22 0930 (!) 88/43 -- 71 18 96 % --   11/21/22 0915 (!) 156/93 -- 74 20 100 % --   11/21/22 0900 (!) 122/92 -- 72 15 100 % --   11/21/22 0845 (!) 122/105 -- 80 21 -- --   11/21/22 0830 (!) 118/100 -- 86 19 -- --   11/21/22 0815 91/60 -- 71 22 99 % --   11/21/22 0800 -- 97 °F (36.1 °C) 69 21 98 % --   11/21/22 0745 (!) 60/46 -- 68 22 94 % --   11/21/22 0730 (!) 93/50 -- 67 16 100 % --   11/21/22 0715 (!) 88/49 -- (!) 58 15 100 % --   11/21/22 0700 (!) 106/43 -- 61 15 100 % --   11/21/22 0645 (!) 106/52 -- (!) 57 14 100 % --   11/21/22 0630 (!) 102/54 -- 61 19 100 % --   11/21/22 0615 (!) 110/49 -- 63 14 100 % --       Body mass index is 34.27 kg/m². PHYSICAL EXAM:    General: The patient appears comfortable in bed, tired  HEENT: NCAT, EOM intact; oral mucosa well perfused  Neck: supple, no LAD, no tenderness  CVS: RRR, 3/6 ejection murmur  Lungs: chest clear, no wheezing, rales, normal symmetric air entry    Abdomen: Soft, non-distended, non-TTP, BS+, no organomegaly, no masses  Ext: No calf tenderness, peripheral pulses present, no significant edema.   Skin: significant ecchymosis over upper extremities, particularly at needle puncture sites  Neuro: No focal neurologic deficits or gross abnormalities  Psych: A&Ox3     RECENT LABS AND IMAGING ON ADMISSION   Recent Results (from the past 24 hour(s))   CBC WITH AUTOMATED DIFF    Collection Time: 11/21/22  6:25 AM   Result Value Ref Range    WBC 8.8 4.6 - 13.2 K/uL    RBC 2.20 (L) 4.20 - 5.30 M/uL    HGB 7.4 (L) 12.0 - 16.0 g/dL    HCT 22.2 (L) 35.0 - 45.0 %    .9 (H) 78.0 - 100.0 FL    MCH 33.6 24.0 - 34.0 PG    MCHC 33.3 31.0 - 37.0 g/dL    RDW 16.1 (H) 11.6 - 14.5 %    PLATELET 041 046 - 734 K/uL    MPV 9.7 9.2 - 11.8 FL    NRBC 0.0 0  WBC    ABSOLUTE NRBC 0.00 0.00 - 0.01 K/uL    NEUTROPHILS 69 40 - 73 %    LYMPHOCYTES 12 (L) 21 - 52 %    MONOCYTES 15 (H) 3 - 10 %    EOSINOPHILS 3 0 - 5 %    BASOPHILS 1 0 - 2 %    IMMATURE GRANULOCYTES 2 (H) 0.0 - 0.5 %    ABS. NEUTROPHILS 6.1 1.8 - 8.0 K/UL    ABS. LYMPHOCYTES 1.0 0.9 - 3.6 K/UL    ABS. MONOCYTES 1.3 (H) 0.05 - 1.2 K/UL    ABS. EOSINOPHILS 0.2 0.0 - 0.4 K/UL    ABS. BASOPHILS 0.1 0.0 - 0.1 K/UL    ABS. IMM.  GRANS. 0.2 (H) 0.00 - 0.04 K/UL    DF AUTOMATED     MAGNESIUM    Collection Time: 11/21/22  6:25 AM   Result Value Ref Range    Magnesium 2.4 1.6 - 2.6 mg/dL   METABOLIC PANEL, BASIC    Collection Time: 11/21/22  6:25 AM   Result Value Ref Range    Sodium 127 (L) 136 - 145 mmol/L    Potassium 6.1 (HH) 3.5 - 5.5 mmol/L    Chloride 96 (L) 100 - 111 mmol/L    CO2 19 (L) 21 - 32 mmol/L    Anion gap 12 3.0 - 18 mmol/L    Glucose 94 74 - 99 mg/dL    BUN 52 (H) 7.0 - 18 MG/DL    Creatinine 12.50 (H) 0.6 - 1.3 MG/DL    BUN/Creatinine ratio 4 (L) 12 - 20      eGFR 3 (L) >60 ml/min/1.73m2    Calcium 8.5 8.5 - 10.1 MG/DL   PHOSPHORUS    Collection Time: 11/21/22  6:25 AM   Result Value Ref Range    Phosphorus 7.4 (H) 2.5 - 4.9 MG/DL   BLOOD GAS,CHEM8,LACTIC ACID POC    Collection Time: 11/21/22  9:15 AM   Result Value Ref Range    Calcium, ionized (POC) 1.11 (L) 1.12 - 1.32 mmol/L    Base deficit (POC) 10.4 mmol/L    HCO3 (POC) 15.3 (L) 22 - 26 MMOL/L    CO2, POC 15 (L) 19 - 24 MMOL/L    O2 SAT 73 %    Sample source VENOUS BLOOD      Performed by Dewane Saint     Sodium (POC) 126 (L) 136 - 145 mmol/L    Potassium (POC) 5.0 3.5 - 5.1 mmol/L    Glucose (POC) <20 (LL) 65 - 100 mg/dL    Creatinine (POC) 10.77 (H) 0.6 - 1.3 mg/dL    Lactic Acid (POC) 2.97 (HH) 0.40 - 2.00 mmol/L    Chloride (POC) 99 98 - 107 mmol/L    Anion gap, POC 13 10 - 20      pH, venous (POC) 7.28 (L) 7.32 - 7.42      pCO2, venous (POC) 32.4 (L) 41 - 51 MMHG    pO2, venous (POC) 43 (H) 25 - 40 mmHg    Critical value read back 7    GLUCOSE, POC    Collection Time: 11/21/22  9:25 AM   Result Value Ref Range    Glucose (POC) 75 70 - 110 mg/dL        XR (Most Recent). Results from [place] encounter on 11/14/22    XR CHEST PORT    Narrative  Portable CXR:    HISTORY: Central line placement. Comparison November 14, 2022. Left IJ catheter crosses midline, tip in the mid SVC. No left pneumothorax. Lungs are hypoinflated. Mild vascular congestion still suspected. No  consolidation. Eventration right hemidiaphragm, stable. Impression  No pneumothorax. Mild vascular congestion. Left IJ catheter tip in  the SVC. CT (Most Recent) Results from Hospital Encounter encounter on 11/14/22    CTA CHEST ABD PELV W WO CONT    Narrative  CT angiogram aorta. CT chest, abdomen and pelvis without and with IV contrast.    HISTORY: Chest pain and back pain with hypotension. All CT scans at this facility are performed using dose optimization technique as  appropriate to a performed exam, to include automated exposure control,  adjustment of the mA and/or kV according to patient size (including appropriate  matching for site specific examination) or use of iterative reconstruction  technique. Dialysis patient, to get dialysis the next morning. Axial CT images obtained. Sagittal and coronal MIP images of the aorta were  generated. Dedicated 3-D images of the aorta and its branches also generated on  a dedicated workstation. Sagittal and coronal images of abdomen and pelvis also  generated. CT angiogram aorta: Noncontrast study shows no intramural hematoma. Contrast  study shows normal caliber throughout. No aortic dissection. Moderate  atherosclerotic calcification present. No focal aneurysm. Some degree of  stenosis due to plaques in the celiac and SMA origins. PATRICIO is occluded, also  obscured by motion artifacts. Stenotic renal arteries.  Iliac arteries are within  normal caliber. Minimal ectasia of the bifurcation. CT CHEST: No pulmonary infiltrate or consolidation. No pleural effusion or  pneumothorax. No mediastinal or hilar mass. No cardiomegaly or pericardial  effusion. Corticated calcification noted. No central pulmonary embolism. CT ABDOMEN/PELVIS: Liver and spleen unremarkable. Cholecystectomy. Small hiatal  hernia. Gastric surgery. Pancreas grossly unremarkable. No adrenal mass. Atrophic kidneys with cystic lesions. Double J ureteral stent on the right. No  surrounding inflammation. Small bowel and colon not distended. No extraluminal inflammation. Suspect  scattered colonic diverticulosis. No ascites or free air. Mild subcutaneous edema throughout. Small amount of excreted contrast material  in the decompressed bladder. Uterus unremarkable. No pelvic mass. Degenerative disease in the thoracolumbar spine. Old left inferior pubic ramal  fracture. Impression  1. No aortic aneurysm or dissection. Moderately advanced atherosclerotic  disease. 2. No acute abnormalities in the chest, abdomen or pelvis. ECHO No results found for this or any previous visit. EKG No results found for this or any previous visit. I have discussed Ms. Cara Stern case with my attending who agrees with the plan of care.     Heavenly Bo MD , PGY-1   [facility]   November 22, 2022, 1:52 PM

## 2022-11-22 NOTE — HOSPICE
Spoke with John Downs and he advised he just got to the hospital and have not spoken with the medical team to see if she is ready to be discharged. He reported having space for DME. Pt already has a walker, wheelchair, and rollator. Advised once Hospice is agreed upon discharge usually happens within 24-48 hours and it only takes 2-4 hours to have DME delivered. He voiced wanting to make sure she was stable but getting her home as soon as possible. Hospice is able to admit today. Please send scripts for comfort medications to the outpatient pharmacy to be filled and sent home with the pt at discharge. I have verified with the pharmacy the medication is available. 1. Morphine concentrate 20mg/ml  Give 0.25-1ml po/sl every 3 hours PRN for pain/air hunger  Quantity 30ml     2. Lorazepam oral solution 2mg/ml   Give 0.5mg -1ml (0.25-0.5ml) po/sl every 6 hours PRN anxiety/agitation   Quantity 30 ml        3. Hyoscyamine 0.125mg tablets  Give 1 tab po/sl every 6 hours PRN terminal congestion  Quantity 10 tabs. 4.  Bisacodyl Suppository 10mg  Give one suppository rectally every day PRN  Quantity 5 suppositories     5.  Acetaminophen Suppository 650mg  Give one suppository rectally every four (4)   hours PRN  Quantity 4 suppositories      Blank Castellanos, [address] [place] [address], [facility] [address]  [phone]  Email: [email or web address]

## 2022-11-22 NOTE — PROGRESS NOTES
Received discharge [facility] prescriptions. [facility] billing information.

## 2022-11-23 NOTE — HOSPICE
Medication refills ordered this visit: No refills needed at this time    Medications reconciled and all medications are available in the home this visit. Education was provided regarding medications, medication interactions, and look alike medications. Response to teaching. Daughter in law verbalized an understanding of the comfort medications and their dosages and frequencies. Feels comfortable giving them. Medications are effective at this time. Supplies by type and quantity ordered this visit include: None needed at this time    Consulted medical director/attending physician regarding: Not needed at this time    Instructed patient/family/caregiver on 24-hour hospice availability and phone number. Plan for next visit:  Visits increased to 3x weekly. Visit scheduled for Saturday.   Medications reviewed with [facility]

## 2022-11-23 NOTE — HOSPICE
Upon arrival to home, patient is resting quietly in bed in no acute distress. Patient's son, Jas Miguel and his wife, William Melissa are at bedside. Patient awakens easily to verbal stimuli and answers simple questions appropriately during admission. Her speech fluctuates from comprehensible to very soft and hard to understand. She is alert and oriented to person, place and situation. However, she was dozing off and on during the completion of this assessment. Skin is warm, dry and intact. There are numerous bruises noted to arm, both left and right side of neck, and her abdomen that family states is \"from IVs, central lines, blood pressure cuffs and heparin shots in the hospital.\" There is no broken skin noted at this time. Lungs are clear in upper fields, and diminished in lower lobes bilaterally. Respiration are even and unlabored. O2 sat is 97% on room air. Patient denies feeling short of breath on admission. Patient also denies pain on admission. Her main complaint was \"all this itching\". William Melissa states that \"she got Benadryl in the hospital and it helped and that's what we're planning on doing now as well. \" Abdomen is obese, soft, non-tender and non-distended. Bowel sounds are hypoactive. William Melissa states she's \"not exactly sure of her last bm but I know the hospital gave her a suppository a couple of days ago and I think she had one then. \" William Melissa also stated that the patient \"hasn't urinated in about a year now, so she isn't really wet unless she has a bowel movement. \" Patient was able to help us roll her from side to side and hold onto the rail to complete her assessment and apply a brief in case she has a bowel movement. There is no edema noted to upper or lower extremites. Pedal pulses present bilaterally. Patient repositioned on left side for comfort prior to completing assessment. Patient tolerated assessment well. Hospice Admission Summary    Mrs. Hector Muñoz, 38-year-old female, admitted to Hospice services for a terminal diagnosis of ESRD. Patient has elected hospice services and is no longer seeking aggressive treatment. Co-morbidities related to the terminal diagnosis are neuropathy, A-fib, debility and hypotension. Patient also has a past medical history of chronic hypotension, right hydronephrosis with stent, depression, A-fib, diabetes with neuropathy and ESRD on hemodialysis. Most recently patient was seen in ER with acute on chronic hypotension, symptomatic bradycardia, diarrhea and hallucination. Reportedly, patient had nausea vomiting and diarrhea since She was started on digoxin. She started having significant weakness and decided to come to the emergency room. She has not received dialysis since being hospitalized and has expressed no desire to resume. IV pressors and antibiotics were stopped. Started on PO midodrine. Pt declined getting a pacemaker. Evaluated for GIP but all symptoms were mild and manageable in-home setting. Mary Foster was present and willing and safely able to provide care and administer medications daily and as needed. Mary Foster participated in goal setting, care planning, and are agreeable to the care plan. Admission booklet reviewed with patient/family/caregiver/facility; services provided under hospice benefit, review of rights and responsibilities, disposal of medications, contact information for , Joint Commission, Medicare, O, and outside resources for independence. The patient/family/caregiver/facility educated on IDT and their right to attend meetings. Education provided regarding 24-hour availability of hospice services and on-call number provided. Attending physician: Dr Eugene Mixon Director: Dr Ya Fox of Care: Routine  Advance Directives: POST on file and in home  Allergies:  Albumin, Ciprofloxacin, Cyclopentolate, Iron sucrose, Statins-hmg-coa reductase inhibitors  LMAC:  28cm  Height:  5'2  Weight:  187 lbs  PPS: 20%  FAST:  N/A  NYHA:  N/A   EF%:  N/A   Tobacco usage:  N/A  Functional status: Dependent for all ADLs  Infection:  N/A   Pain:  Patient denied pain on admission. Morphine initiated on admission. Bowel Regimen:  Bisacodyl 10mg supp prn initiated on admission. Lines, Drains, or Airways present: N/A  Wounds present:  No wounds present on admission. Symptoms to monitor to maintain comfort:  Itching, anxiety  Hospice Aide Services Requested: 2x/week  MSW Requested: open to   Requested: open to  [employer] Requested:  not on admission  Bereavement risk/contact:  low for Carmell Epley and Justine Haleigh  Patient/family/caregiver specific end of life goal:  to keep patient as comfortable as possible  Training and education provided this visit: Hospice philosophy/goals, 24 hour number, comfort medication education. Plan for next visit: reassess itching and anxiety. Patient was not in the [facility].

## 2022-11-23 NOTE — PROGRESS NOTES
Physician Progress Note      Yoan Corrales  CSN #:                  [id number]  :                       1945  ADMIT DATE:       2022 1:34 PM  DISCH DATE:        2022 6:19 PM  RESPONDING  PROVIDER #:        Medina Li MD          QUERY TEXT:    Pt admitted with Acute on chronic Hypotension. Pt noted to have Urosepsis documented with +UA, and WBC 16.4 within 24 hours of admission. If possible, please document if you are evaluating and /or treating any of the following: The medical record reflects the following:  Risk Factors: 77 y/o, UTI, DM ESRD,    Clinical Indicators:  * Within 24 hours of admission WBC 16.4  * Acute on chronic hypotension requiring levophed and dopamine  * Urine culture collected after 24 hours of abx without growth    Treatment:  IV Vanc and Cefipime, IV Levaquin, serial CBCs, lactics, UA and urine culture, blood cultures    Thank you,  Tavon NASHN, RN, CRCR  Please contact me for any questions or concerns regarding this query at [email or web address]  Options provided:  -- Sepsis, present on admission  -- UTI without Sepsis  -- Other - I will add my own diagnosis  -- Disagree - Not applicable / Not valid  -- Disagree - Clinically unable to determine / Unknown  -- Refer to Clinical Documentation Reviewer    PROVIDER RESPONSE TEXT:    This patient has sepsis which was present on admission. Query created by: Laurel Villalobos on 2022 7:51 AM      QUERY TEXT:    Pt admitted with Hypotension. Pt noted to have UTI, Sympomatic bradycardia, Hypotension requiring levophed and dopamine. If possible, please document in the progress notes and discharge summary if you are evaluating and/or treating any of the following:     The medical record reflects the following:  Risk Factors:  ESRD, PAF, DM, Hypotension, UTI,    Clinical Indicators:  * WBC 16.4  * UA returned and was highly suggestive of UTI (+for bacteria, leuk est, nitrites, WBC);  * Acute on chronic hypotension  * Per Progress notes: SIRs/Sepsis with recent instrumentation of stent vs hypovolemia due to poor intake vs symptomatic bradycardia    Treatment: IV Levophed and Dopamine, IVF bolus, 1 dose IV Vanc, 1 dose IV cefepime, Urine and blood cultures, Lactic, Procal    Thank you,  Delane Romberg BSN, RN, CRCR  Please contact me for any questions or concerns regarding this query at [email or web address]  Options provided:  -- Cardiogenic Shock  -- Septic Shock  -- Hypovolemic Shock  -- Hypotension without Shock  -- Other - I will add my own diagnosis  -- Disagree - Not applicable / Not valid  -- Disagree - Clinically unable to determine / Unknown  -- Refer to Clinical Documentation Reviewer    PROVIDER RESPONSE TEXT:    This patient has Septic Shock.     Query created by: Luda Link on 11/17/2022 7:57 PM      Electronically signed by:  Tricia Sierra MD 43/32/0617 9:41 AM

## 2022-11-24 NOTE — HOSPICE
Postmortem care not provided to patient. as the Caregiver actively and willingly participated in AM care just prior to my arrival. Eyes gently closed. Soft rolled towel placed under patient chin. HOB raised to 30 degrees. Provided emotional support. This clinician did/did not stay with family/caregiver until clergy or  home arrived. Family is grieving currently. [employer] [facility] [place] to pick to up remains.

## 2022-11-24 NOTE — HOSPICE
The following standard precautions for infection control were utilized: Face Mask, Safety Glasses and Gloves. Care provided per established plan of care: Only what family allows me to do. Patient is without complaints during care provided. Patient requests: N/A    Family/Caregiver requests: Only a partial bath. Communicated to , Clinical Manager, or Director regarding patient/family/caregiver/aide findings:  Christopher Sawant RN present this visit. Status of patient upon end of hospice aide visit:  Patient laying in her hospital bed positioned on her right side with her eyes closed.

## 2022-12-31 NOTE — ED PROVIDER NOTES
ED Notes by Jani Mckenna MD at 11/14/22 1404        Attestation signed by Merlin Aranda MD at 11/14/22 2010     Patient initially presented for chest pain and difficulty breathing. Initial work-up unremarkable. Patient was noted however to be hypotensive with elevated lactate with new bradycardia which is new from her baseline. She is chronically hypotensive and chronically has lightheadedness that part seems unchanged from her baseline. Given her home dose of midodrine. Overall patient started to improve, chest pain and difficulty breathing resolved, blood pressure started to get better, lactate resolved after some fluid. Therefore we called [facility] for admission however upon their arrival post-stent stated that she was starting to redevelop chest pain and difficulty breathing, now radiating to her back and worsening shortness of breath. Blood pressures dropped to 60s over 40s. Repeat EKG is stable and continues to show slow A. fib. Repeat troponin pending, lactate is uptrending to 2.4. Spoke with pharmacy who states maximum dose of midodrine can be up to 45 therefore given a repeat dose. We will add on a CTA of the chest.  Read talk to cardiology. They still continue to not recommend pacing and believe it is still do the digoxin continue to recommend medication wipeout. States that patient could be started on pressors if needed. Also spoke with Dr. Axel Lepe with nephrology who does not this is a chronic issue for her but with new bradycardia. No further recommendations at this time. We spoke with [facility]. They will discuss the patient with her attending. At this point patient signed out to oncoming provider pending CTA, repeat troponin and reevaluation. We will work on placing a line. I personally saw and examined the patient. I have reviewed and agree with the residents findings, including all diagnostic interpretations, and plans as written.  I was present during the key portions of separately billed procedures. Avis Crow MD               Expand All Collapse All  EMERGENCY DEPARTMENT HISTORY AND PHYSICAL EXAM        Date: 11/14/2022  Patient Name: Jose Enrique Coelho        History of Presenting Illness          Chief Complaint   Patient presents with    Dizziness    Fatigue    Shortness of Breath         History Provided By: Patient and Patient's Son     Location/Duration/Severity/Modifying factors   Jose Enrique Coelho is a 77 y/o female with a PMHx significant for atrial fibrillation, end-stage renal disease on hemodialysis Monday Wednesday and Friday, hypothyroidism, history of hyperkalemia, chronic hypotension on midodrine, and diabetes mellitus 2 presenting for evaluation of chest pain radiating to back and weakness after inability to complete 4 hours of dialysis. She completed about 3 hours before the line fell out of her arm and she began to bleed. Patient's son is at bedside to supplement patient's history. Patient notes that she has been having nausea, vomiting, and diarrhea since about July; however, she has been feeling weak since starting digoxin October 19, 2022. At that time patient was told to stop taking amiodarone. She has had episodes of of her legs just giving out on her and stumbling and falling. Patient has not hit her head with these falls but has been able to grab onto a chair to help her self back up. She has history of atrial fibrillation; however, since about August when her watchman device was reevaluated it was noted that patient has had sinus bradycardia. On August 15, 2022 when the watchman device was evaluated with a CATINA, patient was told to switch to Plavix and aspirin and stop Eliquis because the watchman was in good position without thrombus or leak. Dizziness   Associated symptoms include chest pain, dizziness and weakness.  Pertinent negatives include no confusion, no fever, no abdominal pain, no nausea, no vomiting, no congestion and no light-headedness. Her past medical history does not include no syncope. Fatigue  Associated symptoms include chest pain and shortness of breath. Pertinent negatives include no abdominal pain. Shortness of Breath  Associated symptoms include chest pain. Pertinent negatives include no fever, no rhinorrhea, no sore throat, no cough, no vomiting, no abdominal pain and no rash. There are no other complaints, changes, or physical findings at this time. PCP: Liliana Palumbo MD               Current Facility-Administered Medications   Medication Dose Route Frequency Provider Last Rate Last Admin    sodium chloride 0.9 % bolus infusion 250 mL  250 mL IntraVENous ONCE TrevorVioleta MD                 Current Outpatient Medications   Medication Sig Dispense Refill    metoprolol tartrate (LOPRESSOR) 25 mg tablet TAKE 1/2 (ONE-HALF) TABLET BY MOUTH TWICE DAILY        digoxin (LANOXIN) 0.125 mg tablet TAKE 1 TABLET BY MOUTH EVERY OTHER DAY (NON DIALYSIS DAYS)        aspirin delayed-release 81 mg tablet Take 1 Tablet by mouth daily. 30 Tablet 5    clopidogreL (Plavix) 75 mg tab Take 1 Tablet by mouth in the morning. 30 Tablet 4    gabapentin (NEURONTIN) 100 mg capsule Take 1 Capsule by mouth nightly. Max Daily Amount: 100 mg. Indications: concern for back pain post dialysis 30 Capsule 0    melatonin 5 mg tablet Take 5 mg by mouth nightly. HYDROmorphone (DILAUDID) 2 mg tablet Take 1 mg by mouth every eight (8) hours as needed for Pain. Take 1/2 tablet by mouth every 8 hours as needed for pain level of 5 out of 10 or greater        allopurinoL (ZYLOPRIM) 100 mg tablet Take 1 Tablet by mouth every Monday, Wednesday, Friday. [after hemodialysis]  Indications: treatment to prevent acute gout attack 12 Tablet 0    amiodarone (CORDARONE) 200 mg tablet Take 1 Tablet by mouth daily.  Indications: prevention of recurrent atrial fibrillation 30 Tablet 0    sevelamer carbonate (RENVELA) 800 mg tab tab Take 2 Tablets by mouth three (3) times daily (with meals). Indications: renal osteodystrophy with hyperphosphatemia 180 Tablet 0    acetaminophen (Tylenol Extra Strength) 500 mg tablet Take 1 Tablet by mouth every six (6) hours as needed for Pain. 30 Tablet 0    meclizine (ANTIVERT) 25 mg tablet Take 25 mg by mouth three (3) times daily as needed for Dizziness. DULoxetine (CYMBALTA) 20 mg capsule Take 20 mg by mouth daily. estradioL (Estrace) 0.01 % (0.1 mg/gram) vaginal cream Apply a fingertip amount around the urethra three times a week. 30 g 3    biotin 1,000 mcg chew Take 1 Tab by mouth daily. cyanocobalamin 1,000 mcg tablet Take 1,000 mcg by mouth daily. lactobacillus sp. 50 billion cpu (BIO-K PLUS) 50 billion cell -375 mg cap capsule Take 1 Cap by mouth daily. 30 Cap 2    ascorbic acid, vitamin C, (VITAMIN C) 500 mg tablet Take 500 mg by mouth daily. cholecalciferol (VITAMIN D3) (2,000 UNITS /50 MCG) cap capsule Take 2,000 Units by mouth two (2) times a day. latanoprost (XALATAN) 0.005 % ophthalmic solution Administer 1 Drop to both eyes nightly. One drop at bedtime        levothyroxine (SYNTHROID) 125 mcg tablet Take 125 mcg by mouth Daily (before breakfast). omeprazole (PRILOSEC) 20 mg capsule Take 20 mg by mouth daily. ondansetron (ZOFRAN ODT) 4 mg disintegrating tablet Take 4 mg by mouth every eight (8) hours as needed for Nausea or Vomiting. vit B Cmplx 3-FA-Vit C-Biotin (NEPHRO HOWIE RX) 1- mg-mg-mcg tablet Take 1 Tab by mouth daily.              Past History      Past Medical History:       Past Medical History:   Diagnosis Date    Anemia in end-stage renal disease ([address]      Anticoagulated by anticoagulation treatment       On Apixaban    Chronic hypotension       On Midodrine    Chronic kidney disease       ESRD on HD MWF    Chronic pain      Closed fracture of left inferior pubic ramus, with routine healing, subsequent encounter 11/12/2021    Closed fracture of superior pubic ramus, left, with routine healing, subsequent encounter 11/12/2021    Closed nondisplaced fracture of anterior wall of left acetabulum with routine healing 11/12/2021    Depression      End-stage renal disease on hemodialysis ([address]       HD at [address] on [facility] on MWF.  Tel # [phone]    Gastroesophageal reflux disease      Glaucoma      History of acute pyelonephritis 02/20/2020    History of hydronephrosis 10/05/2021    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     Urine culture (collected 10/8/2021, resulted 10/14/2021) yielded growth of >100,000 colonies/ml of Enterococcus faecalis RESISTANT to Ciprofloxacin, Levofloxacin, Tetracycline and Vancomycin    History of kidney stones      History of recurrent urinary tract infection      History of sepsis 06/18/2021    History of septic shock 10/08/2021    History of urethral stricture      History of urinary tract infection 10/08/2021     Urine culture (collected 10/8/2021, resulted 10/14/2021) yielded growth of >100,000 colonies/ml of Enterococcus faecalis RESISTANT to Ciprofloxacin, Levofloxacin, Tetracycline and Vancomycin    Hyperlipidemia      Hyperphosphatemia 11/14/2021    Hypothyroidism      Lung mass      Mononeuropathy       Involving ring finger of left hand    Need for prophylactic isolation 10/08/2021     Urine culture (collected 10/8/2021, resulted 10/14/2021) yielded growth of >100,000 colonies/ml of Enterococcus faecalis RESISTANT to Ciprofloxacin, Levofloxacin, Tetracycline and Vancomycin    Osteoporosis      Paroxysmal atrial fibrillation ([address]      Secondary hyperparathyroidism of renal origin ([address]      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 ([address]       HbA1c (10/8/2021) = 4.6    Uric acid nephrolithiasis      Urinary incontinence           Past Surgical History:        Past Surgical History:   Procedure Laterality Date    HX APPENDECTOMY HX CHOLECYSTECTOMY        HX GASTRIC BYPASS         Gastric stapling    HX KNEE ARTHROSCOPY        HX UROLOGICAL         right PCN placement    HX UROLOGICAL   07/23/2018     RIGHT URETEROSCOPY WITH HOLMIUM LASER    IR EXCHANGE NEPHRO PERC LT SI   2/21/2020    IR EXCHANGE NEPHRO PERC RT SI   4/13/2020    IR EXCHANGE NEPHRO PERC RT SI   7/17/2020    IR NEPHROSTOMY PERC RT PLC CATH  SI   10/14/2020    IR NEPHROURETERAL PERC RT PLC CATH NEW ACCESS  SI   4/30/2020    VT INTRO CATH DIALYSIS CIRCUIT DX ANGRPH FLUOR S&I Left 9/24/2020     FISTULOGRAM LEFT/poss permanent catheter placement performed by Ira Fuentes MD at [facility] CATH LAB    VASCULAR SURGERY PROCEDURE UNLIST         lef AVF         Family History:        Family History   Problem Relation Age of Onset    Heart Surgery Sister           Social History:  Social History           Tobacco Use    Smoking status: Never    Smokeless tobacco: Never   Vaping Use    Vaping Use: Never used   Substance Use Topics    Alcohol use: Never    Drug use: Never         Allergies: Allergies   Allergen Reactions    Albumin, Human 25 % Itching       Headache - severe migraine like, itchy eyes, runny nose    Ciprofloxacin Hives    Cyclopentolate Unknown (comments)    Iron Sucrose Diarrhea    Statins-Hmg-Coa Reductase Inhibitors Other (comments)       Body ache            Review of Systems      Review of Systems   Constitutional:  Positive for fatigue. Negative for chills and fever. HENT:  Negative for congestion, hearing loss, rhinorrhea and sore throat. Eyes:  Negative for visual disturbance. Respiratory:  Positive for shortness of breath. Negative for cough. Cardiovascular:  Positive for chest pain. Gastrointestinal:  Positive for diarrhea. Negative for abdominal pain, nausea and vomiting. Genitourinary:  Negative for dysuria, frequency and hematuria. Musculoskeletal:  Negative for gait problem. Skin:  Negative for rash.    Neurological: Positive for dizziness and weakness. Negative for syncope and light-headedness. Psychiatric/Behavioral:  Negative for confusion. Physical Exam      Physical Exam  Vitals and nursing note reviewed. Constitutional:       General: She is not in acute distress. Comments: Pt in Trendelenburg position. HENT:      Head: Normocephalic and atraumatic. Right Ear: External ear normal.      Left Ear: External ear normal.      Nose: Nose normal.      Mouth/Throat:      Mouth: Mucous membranes are moist.      Pharynx: Oropharynx is clear. Eyes:      Extraocular Movements: Extraocular movements intact. Conjunctiva/sclera: Conjunctivae normal.   Cardiovascular:      Rate and Rhythm: Regular rhythm. Bradycardia present. Pulses: Normal pulses. Heart sounds: Normal heart sounds. Pulmonary:      Effort: Pulmonary effort is normal. No respiratory distress. Breath sounds: Normal breath sounds. Chest:      Chest wall: No tenderness. Abdominal:      General: There is no distension. Palpations: Abdomen is soft. Tenderness: There is no abdominal tenderness. There is no guarding or rebound. Musculoskeletal:         General: No swelling or tenderness. Normal range of motion. Cervical back: Normal range of motion. Right lower leg: No tenderness. No edema. Left lower leg: No tenderness. No edema. Skin:     General: Skin is warm and dry. Neurological:      General: No focal deficit present. Mental Status: She is alert and oriented to person, place, and time. Mental status is at baseline.    Psychiatric:         Mood and Affect: Mood normal.         Behavior: Behavior normal.         Lab and Diagnostic Study Results      Labs -  Recent Results         Recent Results (from the past 24 hour(s))   CBC WITH AUTOMATED DIFF     Collection Time: 11/14/22  1:30 PM   Result Value Ref Range     WBC 8.9 4.6 - 13.2 K/uL     RBC 3.12 (L) 4.20 - 5.30 M/uL     HGB 10.4 (L) 12.0 - 16.0 g/dL     HCT 32.3 (L) 35.0 - 45.0 %     .5 (H) 78.0 - 100.0 FL     MCH 33.3 24.0 - 34.0 PG     MCHC 32.2 31.0 - 37.0 g/dL     RDW 14.7 (H) 11.6 - 14.5 %     PLATELET 471 498 - 122 K/uL     MPV 10.2 9.2 - 11.8 FL     NRBC 0.6 (H) 0  WBC     ABSOLUTE NRBC 0.05 (H) 0.00 - 0.01 K/uL     NEUTROPHILS 63 40 - 73 %     LYMPHOCYTES 21 21 - 52 %     MONOCYTES 12 (H) 3 - 10 %     EOSINOPHILS 1 0 - 5 %     BASOPHILS 1 0 - 2 %     IMMATURE GRANULOCYTES 3 (H) 0.0 - 0.5 %     ABS. NEUTROPHILS 5.6 1.8 - 8.0 K/UL     ABS. LYMPHOCYTES 1.8 0.9 - 3.6 K/UL     ABS. MONOCYTES 1.1 0.05 - 1.2 K/UL     ABS. EOSINOPHILS 0.1 0.0 - 0.4 K/UL     ABS. BASOPHILS 0.1 0.0 - 0.1 K/UL     ABS. IMM. GRANS. 0.2 (H) 0.00 - 0.04 K/UL     DF AUTOMATED     IONIZED CALCIUM     Collection Time: 11/14/22  1:48 PM   Result Value Ref Range     Calcium, ionized 1.09 (L) 1.12 - 1.32 mmol/L               Radiologic Studies -   XR CHEST PORT   Final Result   No acute findings or interval change. Medical Decision Making and ED Course   - I am the first and primary provider for this patient AND AM THE PRIMARY PROVIDER OF RECORD. - I reviewed the vital signs, available nursing notes, past medical history, past surgical history, family history and social history. - Initial assessment performed. The patients presenting problems have been discussed, and the staff are in agreement with the care plan formulated and outlined with them. I have encouraged them to ask questions as they arise throughout their visit. Vital Signs-Reviewed the patient's vital signs. Patient Vitals for the past 12 hrs:    Temp Pulse Resp BP SpO2   11/14/22 1506 -- (!) 47 17 (!) 86/37 97 %   11/14/22 1451 -- (!) 36 14 (!) 87/31 100 %   11/14/22 1436 -- (!) 43 16 90/64 --   11/14/22 1321 98.2 °F (36.8 °C) (!) 53 18 (!) 58/39 92 %         EKG interpretation: No P waves seen. No QRS widening.   No obvious A. fib; however, with evaluation of calipers there is some shortening of the RR intervals but not in an irregular manner. No evidence of digoxin toxicity. Bradycardia. Records Reviewed: Nursing Notes, Old Medical Records, Previous electrocardiograms, Previous Radiology Studies, and Previous Laboratory Studies           Provider Notes (Medical Decision Making):      MDM  Number of Diagnoses or Management Options  ESRD (end stage renal disease) on dialysis ([facility] [address]  Presence of Watchman left atrial appendage closure device  Symptomatic bradycardia  Diagnosis management comments: Patient with complex history. Patient has had chronic history of bradycardia since about August as well as hypotension for which she is on midodrine for. She is also on digoxin and a beta-blocker. Patient has chronic issues for which she has been seen at the ED for multiple times without resolution of symptoms. Patient also notes that she has gone to her PCP and other physicians for her nausea, vomiting, and diarrhea without relief. And prior ED visit she has commented on similar symptoms as to today which is chest pain with radiation to the back, dizziness, and weakness. Less likely ACS as her troponin is actually improved from October 2, 2015. Patient also without a white count, neutrophil predominance, nor fever. Given her urinary history of hydronephrosis with recent stent placement, initial thought was patient could be septic; however, the aforementioned symptoms make this less likely. She most certainly can still have an infection but it may be early in clinical course. Patient could potentially have a beta-blocker toxicity/overdose; however, patient reports she is taking all of her medications as indicated. EKG without evidence of digoxin toxicity and her level is within normal limits at 1.6. TSH is within normal limits as well. Patient had a cardiac cath May 2022 with the following findings:  No obstructive coronary artery disease.   Mildly elevated filling pressures, mild pulmonary hypertension and normal cardiac output. Mildly elevated LVEDP. Gave one 250 cc of normal saline as patient is a dialysis patient. Upon reevaluation no significant increase in her blood pressure. Her maps are still in the mid 40s to low 50s. She does not appear fluid overloaded nor does her lungs have crackles. [facility] twice and awaiting callback. Will discuss patient case with them. Patient may be admitted considering her lactic acid is 3.07 which can signify symptomatic bradycardia with impact of end-organ perfusion. Given the chronicity of her symptoms, further evaluation of medications and other organic process provoking her symptoms may be required. Stable turnover to Dr. Thao Reynolds.

## 2023-01-01 NOTE — DIABETES MGMT
Neonatology Daily Consult Note      Reason for Consult:   Patient Active Problem List   Diagnosis   • Meconium aspiration with suctioning required   • Meconium aspiration with suctioning required   • Respiratory distress   • Tachypnea       Consult Done Via: In Person      In-Patient Care Primary Care Provider: Pediatric Hospitalist - Dr. Rai      Overnight events: 10/15 patient weaned overnight from CPAP to HFNC with improving respiratory status.  Decreased       Subjective :    Clemente Rivera is an Inborn   Term  3185 g male infant admitted 2023  9:43 AM at Gestational Age: 38w6d now at age: 21-hour old and whose birthday is 2025.     Corrected Gestational Age: 39w0d      Respiratory Settings Last Value   Nasal Cannula Flow 4 L/min (10/15/23 0600)    Mode     Rate     Peak Inspiratory Pressure     Tidal Volume     Inspiratory time     Pressure Support     PEEP/CPAC/EPAP     FiO2 23 % (10/15/23 0600)   Mean     Delta P     Hertz       Last Apnea:  ;    Intervention:      Last Bradycardia: length each event lasted (in sec) over the past 24 hours:  ;    Interventions:        Urine:  No data recorded    Last Stool: 1 (10/14/23 1500)    Transcutaneous Bilirubin  TCB Result:    TCB Site:          Labs (Last 24 hours)  Recent Results (from the past 24 hour(s))   BLOOD GAS, VENOUS CORD BLOOD - RESPIRATORY    Collection Time: 10/14/23  9:59 AM   Result Value Ref Range    BASE EXCESS / DEFICIT, VENOUS CORD BLOOD - RESPIRATORY -3 mmol/L    HCO3, VENOUS CORD BLOOD - RESPIRATORY 25 22 - 29 mmol/L    PCO2, VENOUS CORD BLOOD - RESPIRATORY 56 (H) 23 - 49 mm Hg    PH, VENOUS CORD BLOOD - RESPIRATORY 7.26 7.25 - 7.45 Units    PO2, VENOUS CORD BLOOD - RESPIRATORY <20 17 - 41 mm Hg   BLOOD GAS, ARTERIAL CORD BLOOD - RESPIRATORY    Collection Time: 10/14/23 10:01 AM   Result Value Ref Range    BASE EXCESS / DEFICIT, ARTERIAL CORD BLOOD - RESPIRATORY -5 mmol/L    HCO3, ARTERIAL CORD BLOOD -  Glycemic Control:  Pt with  history of Diabetes. Pt's blood glucose readings are in  the target range. She required 4 units corrective lispro insulin in 24 hours. Recent Glucose Results:   Lab Results   Component Value Date/Time     (H) 11/18/2022 04:00 AM    GLUCPOC 114 (H) 11/18/2022 08:14 AM    GLUCPOC 152 (H) 11/17/2022 09:41 PM    GLUCPOC 130 (H) 11/17/2022 04:21 PM     Lab Results   Component Value Date/Time    Hemoglobin A1c <3.8 (L) 11/15/2022 04:00 AM     Continue to monitor for glycemic control.   Melissa Mora MS RD [facility] RESPIRATORY 24 21 - 28 mmol/L    PCO2, ARTERIAL CORD BLOOD - RESPIRATORY 60 31 - 74 mm Hg    PH, ARTERIAL CORD BLOOD - RESPIRATORY 7.21 7.18 - 7.38 Units    PO2, ARTERIAL CORD BLOOD - RESPIRATORY <20 6 - 31 mm Hg   GLUCOSE, BEDSIDE - POINT OF CARE    Collection Time: 10/14/23 10:17 AM   Result Value Ref Range    GLUCOSE, BEDSIDE - POINT OF CARE 122 (H) 36 - 110 mg/dL   BLOOD GAS, CAPILLARY - RESPIRATORY    Collection Time: 10/14/23 10:22 AM   Result Value Ref Range    BASE EXCESS / DEFICIT, CAPILLARY - RESPIRATORY -9 (L) -2 - 3 mmol/L    CONDITION - RESPIRATORY ; CPAP +6     HCO3, CAPILLARY - RESPIRATORY 24 22 - 28 mmol/L    PCO2, CAPILLARY - RESPIRATORY 87 (HH) 35 - 48 mm Hg    PH, CAPILLARY - RESPIRATORY 7.04 (LL) 7.35 - 7.45 Units    PO2, CAPILLARY - RESPIRATORY 58 (H) 34 - 45 mm Hg    FIO2 - RESPIRATORY 40 %    SITE - RESPIRATORY Capillary     TEMPERATURE - RESPIRATORY 37.0 degrees C   Blood Culture    Collection Time: 10/14/23 10:42 AM    Specimen: Blood, Peripheral   Result Value Ref Range    Culture, Blood or Bone Marrow No Growth <24 hours    CBC with Automated Differential (performable only)    Collection Time: 10/14/23 10:42 AM   Result Value Ref Range    WBC 19.6 9.0 - 30.0 K/mcL    RBC 4.95 3.90 - 5.50 mil/mcL    HGB 16.9 13.5 - 19.5 g/dL    HCT 50.8 42.0 - 60.0 %    .6 98.0 - 118.0 fl    MCH 34.1 31.0 - 37.0 pg    MCHC 33.3 30.0 - 36.0 g/dL    RDW-CV 16.0 (H) 11.0 - 15.0 %    RDW-SD 61.2 (H) 39.0 - 54.0 fL     140 - 450 K/mcL    NRBC 1 (H) <=0 /100 WBC   Manual Differential    Collection Time: 10/14/23 10:42 AM   Result Value Ref Range    Neutrophil, Percent 51 %    Lymphocytes, Percent 31 %    Mono, Percent 14 %    Eosinophils, Percent 3 %    Basophils, Percent 1 %    Absolute Neutrophil 10.0 6.0 - 26.0 K/mcL    Absolute Lymphocytes 6.1 2.0 - 11.0 K/mcL    Absolute Monocytes 2.7 (H) 0.4 - 1.8 K/mcL    Absolute Eosinophils 0.6 0.0 - 0.7 K/mcL    Absolute Basophils 0.2 0.0 - 0.6 K/mcL     WBC Morphology Normal Normal    Macrocytosis Moderate     Platelet Morphology Normal Normal    Polychromasia Few    BLOOD GAS, CAPILLARY - RESPIRATORY    Collection Time: 10/14/23 12:36 PM   Result Value Ref Range    BASE EXCESS / DEFICIT, CAPILLARY - RESPIRATORY -3 (L) -2 - 3 mmol/L    CONDITION - RESPIRATORY ;CPAP+6     HCO3, CAPILLARY - RESPIRATORY 25 22 - 28 mmol/L    PCO2, CAPILLARY - RESPIRATORY 53 (H) 35 - 48 mm Hg    PH, CAPILLARY - RESPIRATORY 7.28 (L) 7.35 - 7.45 Units    PO2, CAPILLARY - RESPIRATORY 56 (H) 34 - 45 mm Hg    FIO2 - RESPIRATORY 26 %    SITE - RESPIRATORY Capillary     TEMPERATURE - RESPIRATORY 37.0 degrees C   Basic Metabolic Panel    Collection Time: 10/15/23  6:01 AM   Result Value Ref Range    Fasting Status      Sodium 129 (L) 135 - 145 mmol/L    Potassium 5.7 3.5 - 6.0 mmol/L    Chloride 98 97 - 110 mmol/L    Carbon Dioxide 21 21 - 32 mmol/L    Anion Gap 16 7 - 19 mmol/L    Glucose 87 36 - 110 mg/dL    BUN 7 5 - 19 mg/dL    Creatinine 1.08 (H) 0.33 - 0.97 mg/dL    Glomerular Filtration Rate      BUN/Cr 6 (L) 7 - 25    Calcium 8.4 7.6 - 11.3 mg/dL   Bilirubin Panel    Collection Time: 10/15/23  6:01 AM   Result Value Ref Range    Bilirubin, Total 5.3 2.0 - 6.0 mg/dL    Bilirubin, Direct 0.1 0.0 - 0.6 mg/dL        Labs above reviewed.    Objective     Vital signs reviewed  Visit Vitals  BP 72/43 (BP Location: RLE - Right lower extremity)   Pulse 116   Temp 98.6 °F (37 °C) (Axillary)   Resp (!) 76   Ht 21.26\" (54 cm)   Wt 3245 g   HC 33.5 cm (13.19\")   SpO2 99%   BMI 11.13 kg/m²        Current Weight 3245 g (10/15/23 0000)   Most recent weights:  Weight    10/14/23 0943 10/15/23 0000   Weight: 3185 g 3245 g     Weight Change Since Birth: 2%     Medications  Current Facility-Administered Medications   Medication   • dextrose 10 % / sodium chloride 0.2 % infusion   • ampicillin 160 mg in NS pediatric IV syringe   • dextrose 10 % infusion        Physical Exam  Vitals signs reviewed.     The  patient was examined by Neonatologist 2023      Physical Exam  Constitutional:       General: He is active.   HENT:      Head: Atraumatic.      Nose: Nose normal.      Mouth/Throat:      Mouth: Mucous membranes are moist.      Neck: Normal range of motion.   Eyes:      Conjunctiva/sclera: Conjunctivae normal.   Cardiovascular:      Rate and Rhythm: Normal rate and regular rhythm.      Heart sounds: Normal heart sounds.   Pulmonary:      Effort: Pulmonary effort is normal. Tachypnea present.   Abdominal:      Palpations: Abdomen is soft.   Genitourinary:     Penis: Normal.    Musculoskeletal:         General: Normal range of motion.   Skin:     General: Skin is warm.      Capillary Refill: Capillary refill takes less than 2 seconds.      Turgor: Normal.   Neurological:      General: No focal deficit present.      Mental Status: He is alert.           Assessment & Plan     Term  male infant at 38 6/7 weeks, now corrected to 39w0d      Patient Active Problem List   Diagnosis   • Meconium aspiration with suctioning required   • Meconium aspiration with suctioning required   • Respiratory distress   • Tachypnea       Former Gestational Age: 38w6d male infant, now corrected to 39w0d    Recommendations:    Thermoregulation:    Baby is under : Warmer      Fluids, Electrolytes and Nutrition:  Assessment:   Full term  infant born via vaginal delivery with meconium.  Had secondary apnea requiring PPV and CPAP in the unit.   NPO initially on D10 @ 80ml/kg/day  Started on BM/Sim 5ml q3hr NG only overnight  BMP Na 129     Plan:  -continue feeds of BM/Sim 5 ml q 3hr NG only can increase to 10ml in PM if tolerating well  - decrease IVF to 60ml/kg and switch to D10 0.2NS  - BMP at 1800    Respiratory System:  Assessment: Transient Tachypnea of   - Full term  infant born via vaginal delivery with meconium.  Had secondary apnea requiring PPV and CPAP in the unit. Initialy CBG showed pC02 retention which resolved on  subsequent CBG.  CXR with reticulogranular pattern.  Transitioned overnight (10/14) to HFNC 4L:23% and tachypnea is decreasing.    Plan:  -continue to wean Fi02 as tolerated  - monitor tachypnea    Apnea/Bradycardia/Desaturations:  Assessment: None    - Last Apnea:   ;    - Intervention:    - Last Bradycardia: length each event lasted (in sec) over the past 24 hours:  ;    - Interventions:        LSCPE:     Plan:  -    Cardio Vascular System:   Assessment: None  -     Plan:  - No active issues     HEENT:   Assessment: None  -     Plan:  - No active issues     Gastrointestinal System:  Assessment: None  -     Plan:  - No active issues     Renal System:  Assessment: None  -     Plan:  - No active issues    Hematology:  Assessment: At risk for Hyperbilirubinemia    Baby's blood type is ;  Bobby: N/A  Maternal blood type is   Information for the patient's mother:  Anita Rivera [[id number]]   A Rh Positive     Last Bilirubin:   Bilirubin, Total (mg/dL)   Date Value   2023 5.3     Last Hg:   HGB (g/dL)   Date Value   2023 16.9         Plan:  - AM bili today 10/15    Infectious Disease:  Assessment: Observation/Evaluation for infectious condition ruled out  -     Early onset sepsis screen:  Gestational Age: 38w6d  Highest Maternal Antepartum Temp (F): 98.1  Rupture of Membranes (Hours): 6.9 hours          Risk at Birth: 0.08  Risk - Well Appearin.03  Risk - Equivocal: 0.4  Risk - Clinical Illness: 1.68    Clinical Exam:  Clinical Illness - Persistent need for NCPAP / HFNC / mechanical ventilation (outside of the delivery room)    Plan:  - Clinical Illness and any EOS Risk: Blood Culture, CBC, Empiric Antibiotics and Vitals per NICU  - Blood culture and CBC on admission - Yes  - Baby was started on Ampicillin/Gentamicin x 36 hours pending Blood Cultures.        Endocrine System:  Assessment: None  -     Plan:    - No active issues       Central Nervous System:  Assessment: None  -     Plan:     - No active  issues    Ophthalmology:  Assessment: None      Plan:   - No active issues      Genetics:  Assessment: None  -    Plan:        Musculo Skeletal:  Assessment: None  -    Plan:      Skin:  Assessment:  -    Plan:      Other:       Therapies:       Screenings & Procedures     1) Immunizations:   Most Recent Immunizations   Administered Date(s) Administered   • Hep B, adolescent or pediatric 2023       2) [place] Hearing Test:      3) ROP Eye Exam Needed?:   No    4) Car Seat Screen:      5) CCHD Screening:   Screening complete:    Right hand reading %:    Foot reading %:    CHD:      6) Circumcision:      7)  State Screen- date drawn (most recent results):        Last 3 results:        NBS at admission: Date: 2023      NBS at 48-72 HOL:  Date :      NBS at 28 DOL or prior to Discharge: Date:     8) Is patient a Synagis Candidate:   ( if yes, see Immunizations above for date of administration)      Assessment: Being actively managed for the following Problem List:  2023-10: Meconium aspiration with suctioning required  2023-10: Meconium aspiration with suctioning required  2023-10: Respiratory distress  2023-10: Tachypnea        I have spoken with the Pediatric Hospitalist, Nursing Staff and Health Care Team  and reviewed findings and plan of management.    The infants current condition and plan of management was updated with Mother and Father at the bedside by Pediatric Hospitalist and Neonatologist  Total time spend on this Consult was 45 mins, of which 30 mins spent on direct patient care.    Michell Sadler MD,   2023   6:58 AM

## 2023-03-17 NOTE — CONSULTS
Consult Note  Consult requested by:   Reyes Armstrong is a 76 y.o. female [address] [facility],[address] who is being seen on consult for esrd  Chief Complaint   Patient presents with    Dizziness    Fatigue    Shortness of Breath     Admission diagnosis: <principal problem not specified>     HPI:77 y o white female with hx of esrd on dialysis mon wed Friday. hx of reccurent uti,hydronephrosis,s/p stent. hx of afib s/p watchman procedure,chronic hypotension on midodrine. admitted with hypotension,nausea ,vomiting,diarrhea,started after treatment with digoxin,noted to be hypotensive and bradycardic,on levophed,dopamine. she is sl confused,poor historian,reviewed chart ,labs ,meds  Past Medical History:   Diagnosis Date    Anemia in end-stage renal disease ([facility] [address]     Anticoagulated by anticoagulation treatment     On Apixaban    Chronic hypotension     On Midodrine    Chronic kidney disease     ESRD on HD MWF    Chronic pain     Closed fracture of left inferior pubic ramus, with routine healing, subsequent encounter 11/12/2021    Closed fracture of superior pubic ramus, left, with routine healing, subsequent encounter 11/12/2021    Closed nondisplaced fracture of anterior wall of left acetabulum with routine healing 11/12/2021    Depression     End-stage renal disease on hemodialysis ([facility] [address]     HD at [facility] on MWF.  Tel # [phone]    Gastroesophageal reflux disease     Glaucoma     History of acute pyelonephritis 02/20/2020    History of hydronephrosis 10/05/2021    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    Urine culture (collected 10/8/2021, resulted 10/14/2021) yielded growth of >100,000 colonies/ml of Enterococcus faecalis RESISTANT to Ciprofloxacin, Levofloxacin, Tetracycline and Vancomycin    History of kidney stones     History of recurrent urinary tract infection     History of sepsis 06/18/2021    History of septic shock 10/08/2021    History of urethral Pt asks if he can schedule bilateral lumbar and cervical injections? stricture     History of urinary tract infection 10/08/2021    Urine culture (collected 10/8/2021, resulted 10/14/2021) yielded growth of >100,000 colonies/ml of Enterococcus faecalis RESISTANT to Ciprofloxacin, Levofloxacin, Tetracycline and Vancomycin    Hyperlipidemia     Hyperphosphatemia 11/14/2021    Hypothyroidism     Lung mass     Mononeuropathy     Involving ring finger of left hand    Need for prophylactic isolation 10/08/2021    Urine culture (collected 10/8/2021, resulted 10/14/2021) yielded growth of >100,000 colonies/ml of Enterococcus faecalis RESISTANT to Ciprofloxacin, Levofloxacin, Tetracycline and Vancomycin    Osteoporosis     Paroxysmal atrial fibrillation (HCC)     Secondary hyperparathyroidism of renal origin ([facility] [address]     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 ([facility] [address]     HbA1c (10/8/2021) = 4.6    Uric acid nephrolithiasis     Urinary incontinence       Past Surgical History:   Procedure Laterality Date    HX APPENDECTOMY      HX CHOLECYSTECTOMY      HX GASTRIC BYPASS      Gastric stapling    HX KNEE ARTHROSCOPY      HX UROLOGICAL      right PCN placement    HX UROLOGICAL  07/23/2018    RIGHT URETEROSCOPY WITH HOLMIUM LASER    IR EXCHANGE NEPHRO PERC LT SI  2/21/2020    IR EXCHANGE NEPHRO PERC RT SI  4/13/2020    IR EXCHANGE NEPHRO PERC RT SI  7/17/2020    IR NEPHROSTOMY PERC RT PLC CATH  SI  10/14/2020    IR NEPHROURETERAL PERC RT PLC CATH NEW ACCESS  SI  4/30/2020    [place] INTRO CATH DIALYSIS CIRCUIT DX ANGRPH FLUOR S&I Left 9/24/2020    FISTULOGRAM LEFT/poss permanent catheter placement performed by Hamelt Avendaño MD at [facility] CATH LAB    VASCULAR SURGERY PROCEDURE UNLIST      lef AVF       Social History     Socioeconomic History    Marital status: SINGLE     Spouse name: Not on file    Number of children: Not on file    Years of education: Not on file    Highest education level: Not on file   Occupational History    Not on file   Tobacco Use    Smoking status: Never Smokeless tobacco: Never   Vaping Use    Vaping Use: Never used   Substance and Sexual Activity    Alcohol use: Never    Drug use: Never    Sexual activity: Not on file   Other Topics Concern    Not on file   Social History Narrative    Not on file     Social Determinants of Health     Financial Resource Strain: Not on file   Food Insecurity: Not on file   Transportation Needs: Not on file   Physical Activity: Not on file   Stress: Not on file   Social Connections: Not on file   Intimate Partner Violence: Not on file   Housing Stability: Not on file       Family History   Problem Relation Age of Onset    Heart Surgery Sister      Allergies   Allergen Reactions    Albumin, Human 25 % Itching     Headache - severe migraine like, itchy eyes, runny nose    Ciprofloxacin Hives    Cyclopentolate Unknown (comments)    Iron Sucrose Diarrhea    Statins-Hmg-Coa Reductase Inhibitors Other (comments)     Body ache        Home Medications:     Prior to Admission Medications   Prescriptions Last Dose Informant Patient Reported? Taking? DULoxetine (CYMBALTA) 20 mg capsule   Yes No   Sig: Take 20 mg by mouth daily. HYDROmorphone (DILAUDID) 2 mg tablet   Yes No   Sig: Take 1 mg by mouth every eight (8) hours as needed for Pain. Take 1/2 tablet by mouth every 8 hours as needed for pain level of 5 out of 10 or greater   acetaminophen (Tylenol Extra Strength) 500 mg tablet   No No   Sig: Take 1 Tablet by mouth every six (6) hours as needed for Pain. allopurinoL (ZYLOPRIM) 100 mg tablet   No No   Sig: Take 1 Tablet by mouth every Monday, Wednesday, Friday. [after hemodialysis]  Indications: treatment to prevent acute gout attack   amiodarone (CORDARONE) 200 mg tablet   No No   Sig: Take 1 Tablet by mouth daily. Indications: prevention of recurrent atrial fibrillation   ascorbic acid, vitamin C, (VITAMIN C) 500 mg tablet   Yes No   Sig: Take 500 mg by mouth daily.    aspirin delayed-release 81 mg tablet   No No   Sig: Take 1 Tablet by mouth daily. biotin 1,000 mcg chew   Yes No   Sig: Take 1 Tab by mouth daily. cholecalciferol (VITAMIN D3) (2,000 UNITS /50 MCG) cap capsule   Yes No   Sig: Take 2,000 Units by mouth two (2) times a day. clopidogreL (Plavix) 75 mg tab   No No   Sig: Take 1 Tablet by mouth in the morning. cyanocobalamin 1,000 mcg tablet   Yes No   Sig: Take 1,000 mcg by mouth daily. digoxin (LANOXIN) 0.125 mg tablet   Yes No   Sig: TAKE 1 TABLET BY MOUTH EVERY OTHER DAY (NON DIALYSIS DAYS)   estradioL (Estrace) 0.01 % (0.1 mg/gram) vaginal cream   No No   Sig: Apply a fingertip amount around the urethra three times a week.   gabapentin (NEURONTIN) 100 mg capsule   No No   Sig: Take 1 Capsule by mouth nightly. Max Daily Amount: 100 mg. Indications: concern for back pain post dialysis   lactobacillus sp. 50 billion cpu (BIO-K PLUS) 50 billion cell -375 mg cap capsule   No No   Sig: Take 1 Cap by mouth daily. latanoprost (XALATAN) 0.005 % ophthalmic solution   Yes No   Sig: Administer 1 Drop to both eyes nightly. One drop at bedtime   levothyroxine (SYNTHROID) 125 mcg tablet   Yes No   Sig: Take 125 mcg by mouth Daily (before breakfast). meclizine (ANTIVERT) 25 mg tablet   Yes No   Sig: Take 25 mg by mouth three (3) times daily as needed for Dizziness. melatonin 5 mg tablet   Yes No   Sig: Take 5 mg by mouth nightly. metoprolol tartrate (LOPRESSOR) 25 mg tablet   Yes No   Sig: TAKE 1/2 (ONE-HALF) TABLET BY MOUTH TWICE DAILY   omeprazole (PRILOSEC) 20 mg capsule   Yes No   Sig: Take 20 mg by mouth daily. ondansetron (ZOFRAN ODT) 4 mg disintegrating tablet   Yes No   Sig: Take 4 mg by mouth every eight (8) hours as needed for Nausea or Vomiting.   sevelamer carbonate (RENVELA) 800 mg tab tab   No No   Sig: Take 2 Tablets by mouth three (3) times daily (with meals).  Indications: renal osteodystrophy with hyperphosphatemia   vit B Cmplx 3-FA-Vit C-Biotin (NEPHRO HOWIE RX) 1- mg-mg-mcg tablet   Yes No   Sig: Take 1 Tab by mouth daily.       Facility-Administered Medications: None       Current Facility-Administered Medications   Medication Dose Route Frequency    DOPamine (INTROPIN) 800 mg in dextrose 5% 500 mL infusion  5-20 mcg/kg/min IntraVENous TITRATE    epoetin caitlin-epbx (RETACRIT) injection 8,000 Units  8,000 Units SubCUTAneous Q TUE, THU & SAT    insulin lispro (HUMALOG) injection   SubCUTAneous AC&HS    glucose chewable tablet 16 g  4 Tablet Oral PRN    glucagon (GLUCAGEN) injection 1 mg  1 mg IntraMUSCular PRN    dextrose 10% infusion 0-250 mL  0-250 mL IntraVENous PRN    0.9% sodium chloride infusion  50 mL/hr IntraVENous CONTINUOUS    [Held by provider] midodrine (PROAMATINE) tablet 10 mg  10 mg Oral TID WITH MEALS    Vancomycin: Pharmacy to dose   Other Rx Dosing/Monitoring    cefepime (MAXIPIME) 1 g in 0.9% sodium chloride (MBP/ADV) 50 mL MBP  1 g IntraVENous Q24H    NOREPINephrine (LEVOPHED) 8 mg in 5% dextrose 250mL (32 mcg/mL) infusion  0.5-50 mcg/min IntraVENous TITRATE    sodium chloride (NS) flush 5-40 mL  5-40 mL IntraVENous Q8H    sodium chloride (NS) flush 5-40 mL  5-40 mL IntraVENous PRN    acetaminophen (TYLENOL) tablet 650 mg  650 mg Oral Q6H PRN    Or    acetaminophen (TYLENOL) suppository 650 mg  650 mg Rectal Q6H PRN    polyethylene glycol (MIRALAX) packet 17 g  17 g Oral DAILY PRN    ondansetron (ZOFRAN ODT) tablet 4 mg  4 mg Oral Q8H PRN    Or    ondansetron (ZOFRAN) injection 4 mg  4 mg IntraVENous Q6H PRN    heparin (porcine) injection 5,000 Units  5,000 Units SubCUTAneous Q8H    morphine IR (MS IR) tablet 7.5 mg  7.5 mg Oral Q6H PRN    DULoxetine (CYMBALTA) capsule 20 mg  20 mg Oral DAILY    famotidine (PF) (PEPCID) 20 mg in 0.9% sodium chloride 10 mL injection  20 mg IntraVENous Q24H    thiamine (B-1) 200 mg in 0.9% sodium chloride 50 mL IVPB  200 mg IntraVENous DAILY    folic acid (FOLVITE) tablet 1 mg  1 mg Oral DAILY       Review of Systems:   A comprehensive review of systems was negative except for that written in the HPI. Data Review:    Labs: Results:       Chemistry Recent Labs     11/15/22  0400 11/14/22 2026 11/14/22  1330   * 109* 147*    137 139   K 3.6 5.2 3.8    102 101   CO2 26 27 28   BUN 20* 17 15   CREA 6.22* 5.79* 5.42*   CA 8.4* 8.5 9.1   AGAP 9 8 10   BUCR 3* 3* 3*   AP  --   --  107   TP  --   --  6.8   ALB  --   --  3.3*   GLOB  --   --  3.5   AGRAT  --   --  0.9      PTH  Lab Results   Component Value Date/Time    Calcium 8.4 (L) 11/15/2022 04:00 AM    Phosphorus 4.0 11/15/2022 04:00 AM    PTH, Intact 374.7 (H) 11/22/2021 05:28 AM      CBC w/Diff Recent Labs     11/15/22  0400 11/14/22  1330   WBC 16.4* 8.9   RBC 2.84* 3.12*   HGB 9.5* 10.4*   HCT 29.3* 32.3*    282   GRANS 65 63   LYMPH 21 21   EOS 1 1      Coagulation Recent Labs     11/14/22  1330   PTP 13.9   INR 1.0       Iron/Ferritin No results for input(s): IRON in the last 72 hours. No lab exists for component: TIBCCALC   BNP No results for input(s): BNPP in the last 72 hours. Cardiac Enzymes No results for input(s): CPK, CKND1, PATTI in the last 72 hours.     No lab exists for component: CKRMB, TROIP   Liver Enzymes Recent Labs     11/14/22  1330   TP 6.8   ALB 3.3*         Thyroid Studies Lab Results   Component Value Date/Time    TSH 3.14 11/14/2022 01:30 PM        Urinalysis Lab Results   Component Value Date/Time    Color RED 05/26/2022 11:00 PM    Appearance BLOODY 05/26/2022 11:00 PM    Specific gravity 1.020 05/26/2022 11:00 PM    pH (UA) 8.5 (H) 05/26/2022 11:00 PM    Protein >300 (A) 05/26/2022 11:00 PM    Glucose 100 (A) 05/26/2022 11:00 PM    Ketone Negative 05/26/2022 11:00 PM    Bilirubin SMALL (A) 05/26/2022 11:00 PM    Urobilinogen 0.2 05/26/2022 11:00 PM    Nitrites Negative 05/26/2022 11:00 PM    Leukocyte Esterase MODERATE (A) 05/26/2022 11:00 PM    Epithelial cells 1+ 05/26/2022 11:00 PM    Bacteria 4+ (A) 12/02/2021 06:56 AM    WBC  05/26/2022 11:00 PM UNABLE TO QUANTITATE MICROSCOPIC PARAMETERS DUE TO EXCESSIVE RBCS    RBC TOO NUMEROUS TO COUNT 05/26/2022 11:00 PM         IMAGES:   XR Results (maximum last 3): Results from [place] encounter on 11/14/22    XR CHEST PORT    Narrative  EXAM: XR CHEST PORT    INDICATION: chest pain    COMPARISON: 10/21/2022. FINDINGS: A single view of the chest demonstrates chronic linear scarring  without change. No new lung findings. Stable elevated right hemidiaphragm. .  Stable cardiac silhouette. . No acute bone findings. .    Impression  No acute findings or interval change. Results from [place] encounter on 11/08/22    XR RETROGRADE PYELOGRAM    Narrative  History: Right retrograde with stent placement. Total images: 11.  Fluoroscopic time: 19 seconds. FINDINGS:    Intraoperative fluoroscopic images used for surgical guidance which are  nondiagnostic. Images are labeled right. Stent partially visualized. Please see  operative report for full details. Results from Hospital Encounter encounter on 10/21/22    XR CHEST PORT    Narrative  EXAM: Chest Radiograph    INDICATION:  SOB    TECHNIQUE: AP view of the chest    COMPARISON: 10/19/2022, 8/18/2022, 5/25/2022    FINDINGS: No pneumothorax identified. There is scarring in the right midlung  field. There is mild elevation right hemidiaphragm. This is unchanged. No  effusions identified. The cardiomediastinal silhouette is unremarkable. The  pulmonary vasculature is unremarkable. The osseous structures are unremarkable. Impression  1. No acute infiltrate or effusion. 2.  Mild scarring in the right midlung field. CT Results (maximum last 3): Results from [place] encounter on 11/14/22    CTA CHEST ABD PELV W WO CONT    Narrative  CT angiogram aorta. CT chest, abdomen and pelvis without and with IV contrast.    HISTORY: Chest pain and back pain with hypotension.     All CT scans at this facility are performed using dose optimization technique as  appropriate to a performed exam, to include automated exposure control,  adjustment of the mA and/or kV according to patient size (including appropriate  matching for site specific examination) or use of iterative reconstruction  technique. Dialysis patient, to get dialysis the next morning. Axial CT images obtained. Sagittal and coronal MIP images of the aorta were  generated. Dedicated 3-D images of the aorta and its branches also generated on  a dedicated workstation. Sagittal and coronal images of abdomen and pelvis also  generated. CT angiogram aorta: Noncontrast study shows no intramural hematoma. Contrast  study shows normal caliber throughout. No aortic dissection. Moderate  atherosclerotic calcification present. No focal aneurysm. Some degree of  stenosis due to plaques in the celiac and SMA origins. PATRICIO is occluded, also  obscured by motion artifacts. Stenotic renal arteries. Iliac arteries are within  normal caliber. Minimal ectasia of the bifurcation. CT CHEST: No pulmonary infiltrate or consolidation. No pleural effusion or  pneumothorax. No mediastinal or hilar mass. No cardiomegaly or pericardial  effusion. Corticated calcification noted. No central pulmonary embolism. CT ABDOMEN/PELVIS: Liver and spleen unremarkable. Cholecystectomy. Small hiatal  hernia. Gastric surgery. Pancreas grossly unremarkable. No adrenal mass. Atrophic kidneys with cystic lesions. Double J ureteral stent on the right. No  surrounding inflammation. Small bowel and colon not distended. No extraluminal inflammation. Suspect  scattered colonic diverticulosis. No ascites or free air. Mild subcutaneous edema throughout. Small amount of excreted contrast material  in the decompressed bladder. Uterus unremarkable. No pelvic mass. Degenerative disease in the thoracolumbar spine. Old left inferior pubic ramal  fracture. Impression  1. No aortic aneurysm or dissection.  Moderately advanced atherosclerotic  disease. 2. No acute abnormalities in the chest, abdomen or pelvis. Results from [place] encounter on 10/21/22    CT CHEST ABD PELV WO CONT    Narrative  CT CHEST,  ABDOMEN AND PELVIS WITH CONTRAST    COMPARISON: The pelvis March 2022, CT chest abdomen and pelvis November 2021    INDICATIONS: Abdominal pain and chest pain on dialysis    TECHNIQUE:      Scanning was done with a multislice scanner. Volumetric data acquisition was  performed through the chest, abdomen, and pelvis. Reconstructions were created  in the axial, coronal, and sagittal planes. .    CT CHEST FINDINGS:    The lungs are clear. There is no pneumothorax or pleural fluid. The base of the neck is unremarkable. The cardiomediastinal structures appear unremarkable . CT ABDOMEN FINDINGS:    Liver: Previous cholecystectomy. No lesions evident  Spleen: Negative. Small accessory splenule noted. .  Left Kidney: Atrophic with small cysts evident. .  Right Kidney: Atrophic. Small cysts evident. Ureteral stent present extending to  the bladder from the contracted renal pelvis. Pancreas: Normal.  Adrenal Glands: Negative. Stomach, Small Bowel And Colon: Negative. Lymph Nodes: There is no pathologic-appearing adenopathy evident. Peritoneal Spaces: There is no free fluid or free air. The bladder is incompletely distended but unremarkable. The uterus and adnexal structures are unremarkable      Osseous Structures Of Abdomen And Pelvis: No acute or aggressive findings are  evident. .    Impression  No acute abnormalities are identified in the chest abdomen or pelvis. All CT scans at this facility are performed using dose optimization technique as  appropriate to the performed exam, to include automated exposure control,  adjustment of the mA and/or kV according to patient's size (Including  appropriate matching for site-specific examinations), or use of iterative  reconstruction technique.  Dry      Results from Hospital Encounter encounter on 03/03/22    CT PELV WO CONT    Narrative  CT PELVIS WITHOUT ENHANCEMENT    INDICATION: Atrial fibrillation on anticoagulation status post fall with left  knee pain and bruising, pain left hip. .    TECHNIQUE: 5 mm collimation axial images obtained from the iliac crest to the  level of the pubic symphysis without intravenous contrast. Coronal and sagittal  reformatted images. All CT scans at this facility are performed using dose optimization technique as  appropriate to an ER patient is performed exam, to include automated exposure  control, adjustment of the mA and/or kV according to patient size (including  appropriate matching first site-specific examinations), or use of iterative  reconstruction technique. COMPARISON: 11/19/2021. PELVIS FINDINGS:  Lack of intravenous contrast renders this study suboptimal for evaluating the  solid abdominal organs, vasculature and bowel. Incompletely visualized right ureteral stent. Diverticulosis. No lymphadenopathy  or ascites. Bladder is normal. Limited visualized bowel is normal in caliber. Mild anasarca. Interval callus formation at incompletely healed left inferior pubic ramus  fracture. Increased impaction of the left superior pubic ramus at the anterior  acetabulum. There is adjacent callus. There was a fracture at this location on  prior CT from 11/20/2021. No dislocation. Bones are osteopenic. There is  interval sclerosis at the left sacral ala fracture. Impression  1. There has been interval incomplete healing of left sacral ala, left inferior  and superior pubic ramus fractures which were present on 11/19/2021 CT. Increased impaction at the left superior pubic ramus fracture/anterior  acetabulum since that time. 2.  Osteopenia. 3.  Diverticulosis. 4.  Mild anasarca. MRI Results (maximum last 3):   Results from [place] encounter on 03/22/21    MRI LUMB SPINE WO CONT    Narrative  Sagittal and axial multisequence MR images of lumbar spine were obtained. HISTORY: Spinal stenosis. Neurogenic claudication.  images show mild scoliosis, left convexity. Grade 1 anterior spondylolisthesis of L5 on S1. No definite spondylolysis. Trace  retrolisthesis of L1. In general, loss of discal height throughout, with  endplate irregularity and endplate spondylosis. Discogenic endplate fatty  changes at L2-L3. No edema or pathologic marrow signal. Conus medullaris ends at  T12-L1 with normal morphology and signal intensity. Multiple presumed cysts in the kidneys    T11-T12: Sagittal images show posterior disc bulge with mild to moderate  bilateral foraminal stenosis. No nerve root compression suspected. No severe  central stenosis or cord compression. T12-L1: Slightly more prominent posterior disc bulge with endplate spondylosis. No central stenosis. Moderate foraminal stenosis. L1-L2: Disc bulge. Endplate spondylosis. AP canal measures 8.7 mm, mild central  stenosis. With facet hypertrophy moderate bilateral foraminal stenosis. Exiting  L1 nerve root compression possible. L2-L3: Similar posterior disc bulge and endplate spondylosis with mild central  stenosis. Moderate bilateral foraminal stenosis. L3-L4: Mild posterior disc bulge. Facet and ligamentous hypertrophy more severe. Moderate central stenosis. Moderate right and mild left foraminal stenosis. L4-L5: Posterior disc bulge and central disc protrusion, slightly worse on the  left with effacement left lateral recess. Facet and ligamentous hypertrophy. Severe left foraminal stenosis. Compression of left exiting L4 nerve root and  descending L5 nerve root. Mild right foraminal stenosis. L5-S1: Posterior disc bulge. More severe facet and ligamentous hypertrophy. Severe central stenosis with triangular configuration the canal. Bilateral facet  joint effusion and facet inflammation.  Severe left and moderate severe right  foraminal stenosis. Compression of exiting L5 nerve roots. Impression  1. Severe central stenosis with foraminal stenosis at L5-S1 from disc bulging  and facet/ligamentous hypertrophy with facet joint effusion and inflammation. L5  foraminal stenosis with compression of exiting L5 nerve roots. 2. Left-sided disease at L4-L5 with compression of left exiting L4 and  descending L5 nerve roots. 3. Scoliosis. Degenerative disease at other levels as described. 4. Presumed cystic disease in kidneys. Clinical correlation? Nuclear Medicine Results (maximum last 3): Results from [facility] encounter on 01/06/22    NM BONE SCAN 3 PHASE    Narrative  BONE SCAN, THREE PHASE    INDICATION: Left knee pain, duration/onset not provided. Patient reports pain  radiates to left hip. History of bilateral knee replacements greater than 10  years ago. Reported pelvic fracture in November 2021. COMPARISON: No prior bone scan. REFERENCE: Bilateral hip and knee radiographs performed at an outside facility  12/9/2021 have been uploaded into PACS, the reports of the outside radiographs  are not available at this time. TECHNIQUE: Following IV administration of 27.5 mCi 99mTc-MDP at the right hand  IV, three phase bone scan imaging of the bilateral knees was performed. FINDINGS:    Photopenia at the knee bilaterally compatible with metallic arthroplasties. The angiographic flow phase images appear grossly symmetric without focal  abnormal radiotracer uptake seen. The immediate \"blood pool\" phase images demonstrate grossly symmetric  physiologic soft tissue activity without significant focal abnormal radiotracer  uptake. On delayed images there is diffuse mildly increased radiotracer uptake  underlying the knee arthroplasty bilaterally, grossly symmetric, including  distal femur medially and laterally, proximal tibia medially and laterally, and  patella. Findings are grossly symmetric.  Activity along the tibial stem  laterally is minimally more conspicuous on the left side compared to the right. Given the mild degree and near symmetry, nonspecific. Impression  Mild diffuse periprosthetic activity bilaterally as described, nonspecific. Dictated by Jewel Lee D.O., radiology resident physician    As attending radiologist I have assessed the study images, and dictated or  reviewed/edited the final report as needed. US Results (maximum last 3): Results from Hospital Encounter encounter on 11/19/21    US RETROPERITONEUM COMP    Narrative  EXAMINATION: Ultrasound retroperitoneum    INDICATION: Hematuria    COMPARISON: CT 11/19/2021    TECHNIQUE: Grayscale and color sonographic images of the retroperitoneum  obtained. FINDINGS:    Right kidney: 5.2 cm length. Slightly increased echotexture. No hydronephrosis. 1.6 x 1.6 cm mid kidney cyst. No suspicious lesions. Left kidney: 7.1 cm length. Slightly increased echotexture. No hydronephrosis. 2.9 x 2.7 cm upper pole cyst. No suspicious lesions. Bladder: Not well visualized, likely collapsed. Impression  Atrophic echogenic kidneys, consistent with chronic medical renal disease. Bilateral renal cysts. Bladder not well visualized, likely collapsed. Results from [place] encounter on 10/08/21    US RETROPERITONEUM COMP    Narrative  Retroperitoneal Ultrasound Complete    INDICATION: Recent stent exchange 10/5/2021. TECHNIQUE: Select images from retroperitoneal ultrasound provided for  interpretation. COMPARISON: 6/21/2021. FINDINGS:    Right Kidney: The right kidney measures 5.4 x 3.3 x 2.5 cm in size with  significant cortical thinning. The echotexture is increased. No  hydronephrosis. No renal calculi evident. No contour deforming mass. Left Kidney: The left kidney measures 5.7 x 2.8 x 2.4 cm in size. The  echotexture is  increased. No hydronephrosis. .  No renal calculi evident.   1.2 cm cystic-appearing lesion medially. 1.6 cm cyst in the upper pole. 1.5 cm  cyst in the interpolar kidney. Bladder: The bladder is not well distended measuring 3.2 x 4.7 x 7.2 cm (volume  75.2 mL). Impression  Atrophic kidneys compatible with chronic kidney disease. No hydronephrosis. Stent not clearly visualized. Results from Hospital Encounter encounter on 06/18/21    US RETROPERITONEUM COMP    Narrative  EXAM:  US RETROPERITONEUM COMP    INDICATION:  right hydronephrosis    COMPARISON: CT 2 days prior    TECHNIQUE:  Real-time sonography of the kidneys, retroperitoneum and bladder was performed  with multiple static images obtained. FINDINGS:    Both kidneys are poorly delineated due to chronic atrophy. Small bilateral renal  cystic lesions are identified. No definite nephrolithiasis. The previously  identified stent not delineated on current exam. Both kidneys demonstrate  increased echogenicity. Suggestion of mild right hydronephrosis. The urinary bladder is normal.    Impression  Findings similar to previous examination which limited today's exam. There is  bilateral renal atrophy and multiple bilateral small cysts. -Previous right renal ureteral stent not delineated on today's exam which may be  secondary to ultrasound limitations. Suggestion of mild right hydronephrosis. Correlate clinically. CT may be beneficial for further clarification.  -Medical renal disease. Please see report for additional findings and full details. DEXA Results (maximum last 3): Results from Hospital Encounter encounter on 03/15/22    DEXA BONE DENSITY STUDY AXIAL    Narrative  DEXA BONE DENSITOMETRY, CENTRAL    CLINICAL INDICATION/HISTORY: Post menopausal. 64-year-old female for baseline  study. There are disorder/medication. Diabetes. Pelvic fracture. TECHNIQUE: Using GE LUNAR Prodigy densitometer, bone density measurement was  performed in the lumbar spine, the proximal left and right femora and the left  forearm.  T Score refers to standard deviations above or below average compared  to a young adult of the same sex. Z Score refers to standard deviations above  or below average compared to a patient of the same sex, age, race and weight. COMPARISON: None available. FINDINGS:    Lumbar Spine Levels: L1 and L4  Mean Bone Mineral Density (BMD):  1.266 g/cm2  T Score: 0.8  Z Score: 2.7    On PA image of the lumbar spine obtained for localization of vertebral levels  (not a diagnostic quality radiograph), there is apparent increased density at  multiple levels. The density is not well characterized on the basis of this  image, but likely degenerative. Since this density spuriously increases measured  bone mineral density, the least affected levels of L1 and L4 have been chosen as  the most representative sample of this patient's spine bone mineral density. These changes render the lumbar spine of questionable diagnostic validity as a  site for densitometry in this patient. Left distal 1/3 Radius BMD:  0.749 g/cm2  T Score: -1.4  Z Score: 1.1    Left Total Proximal Femur BMD: 0.813 g/cm2  T Score:  -1.5  Z Score:  0.4    Right Total Proximal Femur BMD: 0.861 g/cm2  T Score:  -1.2  Z Score:  0.8    Left Femoral Neck BMD:  0.830 g/cm2  T Score: -1.5  Z Score: 0.6    Right Femoral Neck BMD:  0.839 g/cm2  T Score: -1.4  Z Score: 0.6    Impression  BMD measures consistent with osteopenia/low bone density. Based upon current ISCD guidelines, the patient's overall diagnostic category,  selected using WHO criteria in postmenopausal women and males aged 48 and above,  is selected based upon the lowest T Score from among the lumbar spine, total  femur, femoral neck, (or distal third radius if measured).     WHO Definition of Osteoporosis and Osteopenia on DXA (specified for post  menopausal  females):    Normal:                     T Score at or above -1 SD  Osteopenia:              T Score between -1 and -2.5 SD  Osteoporosis:          T Score at or below -2.5 SD    The risk of fracture approximately doubles for each 1 SD decrease in T Score. It is important to consider other factors in assessing a patient's risk of  fracture, including age, risk of falling/injury, history of fragility fracture,  family history of osteoporosis, smoking, low weight. Various fracture risk tools have been developed for adult patients and are  available online. For example, the FRAX tool developed by [facility] is widely used. Reference www.iscd.org. It is also important to note that DXA measures bone density but does not  distinguish among causes of decreased bone density, which include primary versus  secondary osteoporosis (such as metabolic bone disorders or possible effects of  medications) and also other conditions (such as osteomalacia). Clinical  considerations should determine what additional evaluation may be warranted to  exclude secondary conditions in a patient with low bone density. Please note that reliable, valid comparisons can not be made between studies  which have been performed on different densitometers. If clinically warranted,  follow up study performed at this site would best permit assessment of trend for  possible change in bone mineral density over time in comparison to this study. Thank you for this referral.      FRAN Results (maximum last 3): No results found for this or any previous visit. IR Results (maximum last 3): Results from [place] encounter on 10/14/20    IR NEPHROSTOMY PERC RT PLC CATH  SI    Narrative  Procedure: Exchange NU   for double-J catheter    CLINICAL INDICATION: Attending physician requests exchange for double-J catheter    Exam was obtained moderate conscious sedation was provided for 30 minutes    Fluoroscopy time 3.2 minutes    mGy 70  14 spot images documented the procedure    Cc observing maximum barrier precautions skin was draped and prepped.  The  existing and new catheter was injected demonstrating contrast filling into the  collecting system. Over-the-wire exchange for a 8 Lithuanian double-J catheter was  then performed under fluoroscopy with care taken to holding proximal loop into  the renal pelvis final positioning was good. All wires and catheters were removed from the existing tract. Impression  IMPRESSION:    Technically successful nephrostogram.    Technically successful fluoroscopy assisted exchange for 8 Lithuanian double-J  catheter, right side. Moderate sedation provided for the procedure, 30 minutes      Results from [place] encounter on 07/15/20    IR EXCHANGE NEPHRO PERC RT SI    Narrative  PREOPERATIVE DIAGNOSIS: Obstructive uropathy    POSTOPERATIVE DIAGNOSIS: Same    INDICATION: 66 years of female with history of obstructive uropathy and  previously recanalized high-grade right ureteral stricture and subsequent  nephroureteral tube placement. Patient is now hospitalized with urosepsis. Patient is here for right antegrade nephrostogram and nephroureteral stent  exchange. ATTENDING: Dr. Deena Nayak M.D.    Jeremy Pryor: None. PROCEDURES:  1. Diagnostic right antegrade nephrostograms. 2. Right nephroureteral stents exchange. ANESTHESIA: 1% local lidocaine as well as moderate intravenous sedation with  Versed and fentanyl given and monitored per independently trained interventional  radiology nurse under my direct supervision for 23 minutes. Please see detailed  nursing records for medication dosing. CONTRAST: 30 mL of Visipaque 320. Trula Blew COMPLICATIONS: None    DRAIN:  1. Right antegrade 8 Lithuanian 26 cm nephroureteral catheter close to 4 internal  drainage. CATHETER: None. EBL: Minimal.    SPECIMEN: 3 cc of clear urine was collected and sent for culture and  sensitivity. Fluoroscopy radiation dose: Cumulative Air KERMA = 117 mGy. TECHNIQUE: The risks, benefits, and alternatives were discussed.  Written and  verbal consent obtained. Patient was placed in the prone on the table. Right  flank prepped and draped in usual sterile fashion. . Maximum sterile barrier  technique was used.  view was obtained. Timeout was performed. Detailed  antegrade diagnostic nephrostogram was through the existing catheter was  performed. Under direct fluoroscopic guidance stiff Glidewire was used to secure access to  the urinary bladder. Old right tube was removed over wire. Under direct  fluoroscopic guidance new 8 Kyrgyz 24 cm right antegrade nephroureteral stent  was deployed. Distal coil was formed in the urinary bladder. Proximal coil was  formed in the right renal pelvis. Approximately 3 cc of clear urine was  collected from the right after catheter exchange and sent for culture and  sensitivity. Position of the tube was confirmed with injection of small amount  of contrast media. Sterile dressing was applied. External tubing was capped. Catheter was secured to the skin with 0 Prolene sutures. Patient tolerated procedure fairly well. There were no immediate complications. Patient was transferred to recovery in stable condition. Dr. Celia Osler was  present and performed the procedure. FINDINGS:  view of the abdomen demonstrated good position of right  nephroureteral stent. No fracture or catheter kinking is demonstrated. Detailed  diagnostic antegrade nephrostograms demonstrated no evidence of hydronephrosis  or hydroureter on the right. Tube is patent. New right nephroureteral catheter demonstrates good position. Right  nephroureteral catheter is patent. Impression  IMPRESSION:    Successful, uncomplicated right diagnostic antegrade nephrostogram with  antegrade nephroureteral stent exchange. Proximal and distal renal collecting  systems completely decompressed. Free flow contrast media in the urinary  bladder. No debris. No hydronephrosis or hydroureter.     Plan: Patient should come back interventional radiology department at  approximately 8 weeks for routine exchange of the right nephroureteral stent. Results from [place] encounter on 04/13/20    IR NEPHROURETERAL PERC RT [facility] CATH NEW ACCESS  SI    Narrative  PREOPERATIVE DIAGNOSIS: Obstructive uropathy. POSTOPERATIVE DIAGNOSIS: Same    INDICATION: 66years old female with history of frequent UTIs and right distal  ureteral high-grade stricture with associated obstructive uropathy. Patient  previously had antegrade decompression with nephrostomy and now comes back for  stricture recanalization with nephroureteral stent placement. ATTENDING: Dr. Ronaldo Jones M.D.    Keri Navarro: None. PROCEDURES:  1. Diagnostic right antegrade nephrostogram through the existing catheter. 2. Image guided recanalization of high-grade distal right ureteral stricture. 3. Image guided right distal ureter high-grade stricture balloon ureteroplasty. 4. Image guided right nephroureteral catheter placement. ANESTHESIA: Local 1% lidocaine as well as moderate intravenous sedation with  Versed and fentanyl given and monitored per independently trained interventional  radiology nurse under my direct supervision for 45 minutes. Please see nursing  record for precise medication dosing. CONTRAST: 35 cc of Omnipaque. COMPLICATIONS: None    DRAIN: None. CATHETER: Right 10 Kazakh 26 cm nephroureteral stent. External tubing was capped  at the end of the procedure. EBL: Minimal.    SPECIMEN: None. FLUOROSCOPY RADIATION DOSE: Cumulative Air Kerma = 212 mGy. TECHNIQUE: The risks, benefits, and alternatives were discussed. Written and  verbal consent obtained. Patient was brought to the interventional radiology  suite and placed prone on the table. Right flank was prepped and draped in the  usual sterile fashion. Preoperative antibiotics were given. Timeout was  performed.  view was obtained. 1% lidocaine was used.     Detailed diagnostic antegrade nephrostogram through the existing right  percutaneous nephrostomy tube catheter was obtained without difficulty. Previously placed right nephrostomy tube catheter was removed over wire. With assistance of 4 Kenyan Kumpe catheter, Glidewire advantage was negotiated  in the distal aspect of the right ureter under direct image guidance without  difficulty. Detailed diagnostic antegrade nephrostogram of the distal right ureter was  performed in multiple obliques. Kumpe catheter was removed and over wire 8 Kenyan 45 cm destination sheath was  advanced into the distal right ureter. Under direct fluoroscopic guidance with assistance of Glidewire advantage and  [place] catheter high-grade distal right ureteral stricture was recanalized  and access to the urinary bladder was achieved. This was confirmed with  injection of small amount of contrast media. Over wire with assistance of 6 mm x 4 cm  balloon catheter ureteroplasty  of the high-grade recanalized distal right ureteral stricture was performed  under direct fluoroscopic guidance without difficulty with complete stricture  waist resolution. Balloon catheter was removed over wire. Under direct  fluoroscopic guidance 10 Kenyan, 26 cm nephroureteral stent was  advanced over wire. Coil was formed in the right renal pelvis. Distal coil was  formed in the urinary bladder. Tube position was confirmed with injection of  small amount of contrast media. External tubing was affixed to the skin with O  Prolene. Tube was capped. Sterile dressing was applied. Patient tolerated procedure fairly well. There were no immediate complications. Patient was transferred to recovery in stable condition. Dr. Satnam Ingram was  present and performed the procedure. FINDINGS:  imaging of the abdomen demonstrated essentially unchanged  position of the right PCN catheter.     Detailed diagnostic antegrade nephrostogram demonstrated moderately dilated  right renal pelvis with associated moderate hydronephrosis of the right ureter  and associated high-grade obstruction at the UVJ. Urinary bladder was not  opacified. High-grade stricture was recanalized as described above. Position of the wire was confirmed in the urinary bladder. No contrast  extravasation. Balloon ureteroplasty revealed residual associated high-grade stricture with  complete resolution of strictures waist region. Good position of the new 10 Japanese, 26 cm nephroureteral catheter was noted. Catheter is patent. No evidence of kinking or fracture. External tubing was  capped. The above findings were discussed with urology in patient in detail. Impression  IMPRESSION:    Successful uncomplicated right nephrostomy tube removal, high-grade distal right  ureteral stricture recanalization, right ureteral high-grade stricture balloon  ureteroplasty as well as placement of the new right nephroureteral catheter. Plan: Patient will come back to interventional radiology in approximately 4  weeks for detailed antegrade nephrostogram and possible nephroureteral catheter  removal. If stricture persists however internalization with double-J stent will  be considered. VAS/US Results (maximum last 3): No results found for this or any previous visit. PET Results (maximum last 3): No results found for this or any previous visit. No results found for this or any previous visit.   @[facility]([id number])    CULTURE:   )  Recent Labs     11/14/22 2026 11/14/22 2011   CULT NO GROWTH AFTER 8 HOURS NO GROWTH AFTER 8 HOURS     Recent Labs     11/14/22 2026 11/14/22 2011   CULT NO GROWTH AFTER 8 HOURS NO GROWTH AFTER 8 HOURS       Physical Assessment:   Visit Vitals  BP (!) 115/54   Pulse 60   Temp 98.4 °F (36.9 °C)   Resp 20   Ht 5' 2\" (1.575 m)   Wt 85 kg (187 lb 6.3 oz)   SpO2 97%   BMI 34.27 kg/m²     Last 3 Recorded Weights in this Encounter    11/14/22 1507   Weight: 85 kg (187 lb 6.3 oz)       Intake/Output Summary (Last 24 hours) at 11/15/2022 1158  Last data filed at 11/15/2022 0800  Gross per 24 hour   Intake 276.4 ml   Output --   Net 276.4 ml       Physial Exam:  General appearance: fatigued, cooperative  Skin: normal coloration and turgor, no rashes, no suspicious skin lesions noted. HEENT: Head; normocephalic, atraumatic. MARY. ENT- ENT exam normal, no neck nodes or sinus tenderness. Lungs: clear to auscultation bilaterally  Heart: S1, S2 normal  Abdomen: soft, non-tender. Bowel sounds normal. No masses,  no organomegaly  Extremities: extremities normal, atraumatic, no cyanosis or edema    PLAN / RECOMMENDATION:    Esrd,on dialysis mon wed Friday,may need to insert dialysis cath and switch to crrt if bp doesn't improve  Hypotension,bradycardia,on pressors,cultures pending,adjust antibiotics,give iv ns at 50 cc/hour. continue midodrine  Anemia,give epo     Thank you for the consultation to participate in patient's care. I have personally discussed my plan with the referring physician.      Donte Mccarty MD  November 15, 2022

## 2023-10-26 NOTE — PROGRESS NOTES
[address]  Post Nephrostomy Tube Exchange Progress Note        Ms. Stern seen and examined at bedside for post procedure check. Appeared to be resting well. Upon interview expressed no pain or discomfort. Stated she felt the Nephrostomy go into her bladder and it was painful procedure for her but she is feeling fine now. Also stated she was incontinent a few minutes ago and wanted to be changed. No other complaints. Denies any CP, SOB, abdominal pain, HA, leg pain.      VS: T 97.7   BP 95/57   RR18    [id number]%  P/E  General: in NAD, AAOx4  Heart: RRR, no m/r/g  Lungs: CTAB, no r/r/w  Abdomen: Soft, mild tenderness to palpation in the suprapubic region, R nephrostomy tube in place, secure with a clean, dry bandage, no signs of bleeding or abnormal discharge, nephrostomy bag attached had urine draining  Extremities: 1+ pitting edema b/l    A & P  - Patient stable, no complaints/concerns that need to be addressed at this moment  - Nurse to change the patient and call with any new questions or concerns    Shay Gramajo MD PGY-1   [facility]   Pager: [phone]   Date: 2/21/20 detailed exam

## 2023-10-26 NOTE — Clinical Note
Multiple views of the left atrium obtained using hand injection. Rinvoq Pregnancy And Lactation Text: Based on animal studies, Rinvoq may cause embryo-fetal harm when administered to pregnant women.  The medication should not be used in pregnancy.  Breastfeeding is not recommended during treatment and for 6 days after the last dose.

## 2024-05-05 NOTE — PROGRESS NOTES
End of Shift Note     Bedside and verbal shift change report given to Hanny Thomas (On coming nurse) by Chelsey Wallace (Off going nurse).   Report included the following information:      --Procedure Summary     --MAR,     --Recent Results     --Med Rec Status    SBAR Recommendations:     Issues for Provider to address           Activity This Shift     [] Bed Rest Order   [] Refused   [] Dangled    [] TDWB         Ambulating:     [] Bathroom     [] BSC     [] Room/Hallway      Up in Chair for meals    [x]Yes [] No   Voiding       [] Yes  [] No  Cline          [] Yes  [] No  Incontinent [] Yes  [] No    DUE TO VOID POUR        [] Yes [] No  Purewick    [] Yes [] No  New Onset [] Yes [] No Straight Cath   []Yes  [] No  Condom Cath  [] Yes [] No  MD Called      [] Yes  [] No   Blood Sugars Managed [x]Yes [] No    Bowels Moved [] Yes [] No    Incontinent     [] Yes [] No Passed Gas []Yes [] No    New Onset  []Yes [] No        MD Called []Yes  [] No     CHG Bath Done     Before Surgery     After Surgery      [] Yes  [] No  [] Yes  [] No       Drain Removed [x] Yes  [] No [] N/A Replaced nephrostomy tube on right   Dressing Changed [x] Yes   [] No [] N/A      Nausea/Vomiting [] Yes   [x] No     Ice Packs Changed [] Yes   [] No  [x] N/A    Incentive Spirometer  [] Yes  [x] No      SCD Pumps On     Ankle Pumping  [] Yes   [x] No      [] Yes   [] No        Telemetry Monitoring [x] Yes   [] No   Rhythm NSR 3 = A little assistance

## (undated) DEVICE — SOLUTION IRRIG 3000ML H2O STRL BAG

## (undated) DEVICE — KIT MIC INTR PERC 4F 60CM SMTH -- VSI

## (undated) DEVICE — Z DUP USE 2565107 PACK SURG PROC LEG CYSTO T-DRAPE REINF TBL CVR HND TWL

## (undated) DEVICE — MEDI-VAC NON-CONDUCTIVE SUCTION TUBING: Brand: CARDINAL HEALTH

## (undated) DEVICE — TRAY PREP DRY W/ PREM GLV 2 APPL 6 SPNG 2 UNDPD 1 OVERWRAP

## (undated) DEVICE — COPILOT KIT INCLUDES BLEEDBACK CONTROL VALVE 20/30 INDEFLATOR INFLATION DEVICE 30 ATM 20 CC / GUIDE WIRE INTRODUCER / TORQUE DEVICE: Brand: INDEFLATOR

## (undated) DEVICE — Device

## (undated) DEVICE — STERILE POLYISOPRENE POWDER-FREE SURGICAL GLOVES: Brand: PROTEXIS

## (undated) DEVICE — ACCESS SHEATH WITH DILATOR: Brand: WATCHMAN™ TRUSEAL™ ACCESS SYSTEM

## (undated) DEVICE — COVER US PRB W15XL120CM W/ GEL RUBBERBAND TAPE STRP FLD GEN

## (undated) DEVICE — LUB SURG MEDC STRL 2OZ TUBE MC -- MEDICHOICE

## (undated) DEVICE — PACK PROCEDURE SURG VASC CATH 161 MMC LF

## (undated) DEVICE — PROCEDURE KIT FLUID MGMT CUST MAINFOLD STRL

## (undated) DEVICE — GARMENT,MEDLINE,DVT,INT,CALF,MED, GEN2: Brand: MEDLINE

## (undated) DEVICE — UROLOGY SET

## (undated) DEVICE — CATHETER DIAG 6FR L110CM INTRO 6FR BLLN DIA9MM 1CC PULM ART

## (undated) DEVICE — GAUZE,SPONGE,4"X4",16PLY,STRL,LF,10/TRAY: Brand: MEDLINE

## (undated) DEVICE — GARMENT,MEDLINE,DVT,INT,CALF,FOAM,MED: Brand: MEDLINE

## (undated) DEVICE — MEDI-TRACE CADENCE ADULT, DEFIBRILLATION ELECTRODE -RTS  (10 PR/PK) - PHYSIO-CONTROL: Brand: MEDI-TRACE CADENCE

## (undated) DEVICE — BAND RADIAL COMPR ARTERY 24CM -- REG BX/10

## (undated) DEVICE — DRAPE,ANGIO,BRACH,STERILE,38X44: Brand: MEDLINE

## (undated) DEVICE — SET FLD ADMIN 3 W STPCOCK FIX FEM L BOR 1IN

## (undated) DEVICE — 1 X VERSACROSS TRANSSEPTAL SHEATH (INCLUDING  1 X J-TIP GUIDEWIRE); 1 X VERSACROSS RF WIRE (INCLUDING 1 X CONNECTOR CABLE (SINGLE USE)); 1 X DISPERSIVE ELECTRODE: Brand: VERSACROSS ACCESS SOLUTION

## (undated) DEVICE — GLIDESHEATH SLENDER STAINLESS STEEL KIT: Brand: GLIDESHEATH SLENDER

## (undated) DEVICE — PRESSURE MONITORING SET: Brand: TRUWAVE

## (undated) DEVICE — ELECTRODE ES AD CRD L15FT DISP FOR PT BELOW 30LB REM

## (undated) DEVICE — MANIFOLD CART ULT HI FLOW W 3 PRT FOR SUCT

## (undated) DEVICE — INTRODUCER SHTH 6FR CANN L5.5CM DIL TIP 35MM GRN TUNGSTEN

## (undated) DEVICE — PROCEDURE KIT FLUID MGMT 10 FR CUST MAINFOLD

## (undated) DEVICE — Y-TYPE TUR/BLADDER IRRIGATION SET, REGULATING CLAMP

## (undated) DEVICE — ANGIOGRAPHY KIT CUST VASC

## (undated) DEVICE — TIGER TAIL

## (undated) DEVICE — SUTURE MCRYL SZ 4-0 L18IN ABSRB UD L19MM PS-2 3/8 CIR PRIM Y496G

## (undated) DEVICE — INTRODUCER SHTH 6FR CANN L11CM DIL TIP 35MM GRN TUNGSTEN

## (undated) DEVICE — FLEX ADVANTAGE 3000CC: Brand: FLEX ADVANTAGE

## (undated) DEVICE — BAG DRAINAGE CUST DISP

## (undated) DEVICE — SHIELD RAD ANGIO 16X14 IN RADIAL FEN STRL RADPAD

## (undated) DEVICE — GDWIRE UROL STR 150CM FLX TP -- BX/5 SENSOR

## (undated) DEVICE — BLANKET WRM AD W50XL85.8IN PACU FULL BODY FORC AIR

## (undated) DEVICE — DUAL LUMEN URETERAL CATHETER

## (undated) DEVICE — PACK,CYSTOSCOPY,PK I,AURORA: Brand: MEDLINE

## (undated) DEVICE — MICROPUNCTURE INTRODUCER SET SILHOUETTE TRANSITIONLESS PUSH-PLUS DESIGN - STIFFENED CANNULA WITH NITINOL WIRE GUIDE: Brand: MICROPUNCTURE

## (undated) DEVICE — DEVICE INFL L13IN 40ATM 30ML BASIXTOUCH

## (undated) DEVICE — PTA BALLOON DILATATION CATHETER: Brand: MUSTANG™

## (undated) DEVICE — CATHETER DIAG AD 6FR L110CM 0.038IN COR POLYUR PGTL W/ 6

## (undated) DEVICE — SUTURE ETHBND EXCEL SZ 0 L30IN NONABSORBABLE GRN L26MM CT-2 X412H

## (undated) DEVICE — RADIFOCUS OPTITORQUE ANGIOGRAPHIC CATHETER: Brand: OPTITORQUE

## (undated) DEVICE — INTRODUCER SHTH 8FR CANN L11CM DIL TIP 35MM BLU TUNGSTEN